# Patient Record
Sex: MALE | NOT HISPANIC OR LATINO
[De-identification: names, ages, dates, MRNs, and addresses within clinical notes are randomized per-mention and may not be internally consistent; named-entity substitution may affect disease eponyms.]

---

## 2022-01-01 ENCOUNTER — RESULT REVIEW (OUTPATIENT)
Age: 77
End: 2022-01-01

## 2022-01-01 ENCOUNTER — APPOINTMENT (OUTPATIENT)
Dept: BARIATRICS | Facility: CLINIC | Age: 77
End: 2022-01-01

## 2022-01-01 ENCOUNTER — TRANSCRIPTION ENCOUNTER (OUTPATIENT)
Age: 77
End: 2022-01-01

## 2022-01-01 ENCOUNTER — INPATIENT (INPATIENT)
Facility: HOSPITAL | Age: 77
LOS: 116 days | DRG: 270 | End: 2023-04-03
Attending: STUDENT IN AN ORGANIZED HEALTH CARE EDUCATION/TRAINING PROGRAM | Admitting: HOSPITALIST
Payer: MEDICARE

## 2022-01-01 VITALS
HEART RATE: 136 BPM | DIASTOLIC BLOOD PRESSURE: 90 MMHG | SYSTOLIC BLOOD PRESSURE: 122 MMHG | RESPIRATION RATE: 24 BRPM | WEIGHT: 141.98 LBS | HEIGHT: 78 IN

## 2022-01-01 DIAGNOSIS — D62 ACUTE POSTHEMORRHAGIC ANEMIA: ICD-10-CM

## 2022-01-01 DIAGNOSIS — I50.21 ACUTE SYSTOLIC (CONGESTIVE) HEART FAILURE: ICD-10-CM

## 2022-01-01 DIAGNOSIS — Z78.1 PHYSICAL RESTRAINT STATUS: ICD-10-CM

## 2022-01-01 DIAGNOSIS — D63.1 ANEMIA IN CHRONIC KIDNEY DISEASE: ICD-10-CM

## 2022-01-01 DIAGNOSIS — Z71.89 OTHER SPECIFIED COUNSELING: ICD-10-CM

## 2022-01-01 DIAGNOSIS — J98.11 ATELECTASIS: ICD-10-CM

## 2022-01-01 DIAGNOSIS — Z87.891 PERSONAL HISTORY OF NICOTINE DEPENDENCE: ICD-10-CM

## 2022-01-01 DIAGNOSIS — R00.1 BRADYCARDIA, UNSPECIFIED: ICD-10-CM

## 2022-01-01 DIAGNOSIS — K92.2 GASTROINTESTINAL HEMORRHAGE, UNSPECIFIED: ICD-10-CM

## 2022-01-01 DIAGNOSIS — G93.40 ENCEPHALOPATHY, UNSPECIFIED: ICD-10-CM

## 2022-01-01 DIAGNOSIS — N28.0 ISCHEMIA AND INFARCTION OF KIDNEY: ICD-10-CM

## 2022-01-01 DIAGNOSIS — A41.59 OTHER GRAM-NEGATIVE SEPSIS: ICD-10-CM

## 2022-01-01 DIAGNOSIS — I48.92 UNSPECIFIED ATRIAL FLUTTER: ICD-10-CM

## 2022-01-01 DIAGNOSIS — R57.8 OTHER SHOCK: ICD-10-CM

## 2022-01-01 DIAGNOSIS — R53.81 OTHER MALAISE: ICD-10-CM

## 2022-01-01 DIAGNOSIS — J02.9 ACUTE PHARYNGITIS, UNSPECIFIED: ICD-10-CM

## 2022-01-01 DIAGNOSIS — I82.412 ACUTE EMBOLISM AND THROMBOSIS OF LEFT FEMORAL VEIN: ICD-10-CM

## 2022-01-01 DIAGNOSIS — K59.03 DRUG INDUCED CONSTIPATION: ICD-10-CM

## 2022-01-01 DIAGNOSIS — Z66 DO NOT RESUSCITATE: ICD-10-CM

## 2022-01-01 DIAGNOSIS — R10.9 UNSPECIFIED ABDOMINAL PAIN: ICD-10-CM

## 2022-01-01 DIAGNOSIS — I44.1 ATRIOVENTRICULAR BLOCK, SECOND DEGREE: ICD-10-CM

## 2022-01-01 DIAGNOSIS — S80.822A BLISTER (NONTHERMAL), LEFT LOWER LEG, INITIAL ENCOUNTER: ICD-10-CM

## 2022-01-01 DIAGNOSIS — R57.0 CARDIOGENIC SHOCK: ICD-10-CM

## 2022-01-01 DIAGNOSIS — I50.9 HEART FAILURE, UNSPECIFIED: ICD-10-CM

## 2022-01-01 DIAGNOSIS — E87.6 HYPOKALEMIA: ICD-10-CM

## 2022-01-01 DIAGNOSIS — I26.93 SINGLE SUBSEGMENTAL PULMONARY EMBOLISM WITHOUT ACUTE COR PULMONALE: ICD-10-CM

## 2022-01-01 DIAGNOSIS — R53.2 FUNCTIONAL QUADRIPLEGIA: ICD-10-CM

## 2022-01-01 DIAGNOSIS — K92.1 MELENA: ICD-10-CM

## 2022-01-01 DIAGNOSIS — B18.2 CHRONIC VIRAL HEPATITIS C: ICD-10-CM

## 2022-01-01 DIAGNOSIS — F43.21 ADJUSTMENT DISORDER WITH DEPRESSED MOOD: ICD-10-CM

## 2022-01-01 DIAGNOSIS — F11.13 OPIOID ABUSE WITH WITHDRAWAL: ICD-10-CM

## 2022-01-01 DIAGNOSIS — I82.432 ACUTE EMBOLISM AND THROMBOSIS OF LEFT POPLITEAL VEIN: ICD-10-CM

## 2022-01-01 DIAGNOSIS — R45.851 SUICIDAL IDEATIONS: ICD-10-CM

## 2022-01-01 DIAGNOSIS — R65.21 SEVERE SEPSIS WITH SEPTIC SHOCK: ICD-10-CM

## 2022-01-01 DIAGNOSIS — I26.99 OTHER PULMONARY EMBOLISM WITHOUT ACUTE COR PULMONALE: ICD-10-CM

## 2022-01-01 DIAGNOSIS — E16.2 HYPOGLYCEMIA, UNSPECIFIED: ICD-10-CM

## 2022-01-01 DIAGNOSIS — R64 CACHEXIA: ICD-10-CM

## 2022-01-01 DIAGNOSIS — K55.059 ACUTE (REVERSIBLE) ISCHEMIA OF INTESTINE, PART AND EXTENT UNSPECIFIED: ICD-10-CM

## 2022-01-01 DIAGNOSIS — I99.8 OTHER DISORDER OF CIRCULATORY SYSTEM: ICD-10-CM

## 2022-01-01 DIAGNOSIS — Z51.5 ENCOUNTER FOR PALLIATIVE CARE: ICD-10-CM

## 2022-01-01 DIAGNOSIS — E43 UNSPECIFIED SEVERE PROTEIN-CALORIE MALNUTRITION: ICD-10-CM

## 2022-01-01 DIAGNOSIS — I70.262 ATHEROSCLEROSIS OF NATIVE ARTERIES OF EXTREMITIES WITH GANGRENE, LEFT LEG: ICD-10-CM

## 2022-01-01 DIAGNOSIS — E87.1 HYPO-OSMOLALITY AND HYPONATREMIA: ICD-10-CM

## 2022-01-01 DIAGNOSIS — E83.39 OTHER DISORDERS OF PHOSPHORUS METABOLISM: ICD-10-CM

## 2022-01-01 DIAGNOSIS — K55.9 VASCULAR DISORDER OF INTESTINE, UNSPECIFIED: ICD-10-CM

## 2022-01-01 DIAGNOSIS — Z16.12 EXTENDED SPECTRUM BETA LACTAMASE (ESBL) RESISTANCE: ICD-10-CM

## 2022-01-01 DIAGNOSIS — K66.1 HEMOPERITONEUM: ICD-10-CM

## 2022-01-01 DIAGNOSIS — I13.0 HYPERTENSIVE HEART AND CHRONIC KIDNEY DISEASE WITH HEART FAILURE AND STAGE 1 THROUGH STAGE 4 CHRONIC KIDNEY DISEASE, OR UNSPECIFIED CHRONIC KIDNEY DISEASE: ICD-10-CM

## 2022-01-01 DIAGNOSIS — B37.89 OTHER SITES OF CANDIDIASIS: ICD-10-CM

## 2022-01-01 DIAGNOSIS — N17.0 ACUTE KIDNEY FAILURE WITH TUBULAR NECROSIS: ICD-10-CM

## 2022-01-01 DIAGNOSIS — G47.00 INSOMNIA, UNSPECIFIED: ICD-10-CM

## 2022-01-01 DIAGNOSIS — K55.029 ACUTE INFARCTION OF SMALL INTESTINE, EXTENT UNSPECIFIED: ICD-10-CM

## 2022-01-01 DIAGNOSIS — I51.3 INTRACARDIAC THROMBOSIS, NOT ELSEWHERE CLASSIFIED: ICD-10-CM

## 2022-01-01 DIAGNOSIS — K65.1 PERITONEAL ABSCESS: ICD-10-CM

## 2022-01-01 DIAGNOSIS — F32.1 MAJOR DEPRESSIVE DISORDER, SINGLE EPISODE, MODERATE: ICD-10-CM

## 2022-01-01 DIAGNOSIS — Z43.2 ENCOUNTER FOR ATTENTION TO ILEOSTOMY: ICD-10-CM

## 2022-01-01 DIAGNOSIS — R18.8 OTHER ASCITES: ICD-10-CM

## 2022-01-01 DIAGNOSIS — E86.1 HYPOVOLEMIA: ICD-10-CM

## 2022-01-01 DIAGNOSIS — R45.89 OTHER SYMPTOMS AND SIGNS INVOLVING EMOTIONAL STATE: ICD-10-CM

## 2022-01-01 DIAGNOSIS — L89.150 PRESSURE ULCER OF SACRAL REGION, UNSTAGEABLE: ICD-10-CM

## 2022-01-01 DIAGNOSIS — E87.20 ACIDOSIS, UNSPECIFIED: ICD-10-CM

## 2022-01-01 DIAGNOSIS — A41.9 SEPSIS, UNSPECIFIED ORGANISM: ICD-10-CM

## 2022-01-01 DIAGNOSIS — I42.8 OTHER CARDIOMYOPATHIES: ICD-10-CM

## 2022-01-01 DIAGNOSIS — H15.89 OTHER DISORDERS OF SCLERA: ICD-10-CM

## 2022-01-01 DIAGNOSIS — N18.9 CHRONIC KIDNEY DISEASE, UNSPECIFIED: ICD-10-CM

## 2022-01-01 DIAGNOSIS — K56.7 ILEUS, UNSPECIFIED: ICD-10-CM

## 2022-01-01 DIAGNOSIS — R63.0 ANOREXIA: ICD-10-CM

## 2022-01-01 LAB
A1C WITH ESTIMATED AVERAGE GLUCOSE RESULT: 6 % — HIGH (ref 4–5.6)
ALBUMIN SERPL ELPH-MCNC: 2 G/DL — LOW (ref 3.3–5)
ALBUMIN SERPL ELPH-MCNC: 2.1 G/DL — LOW (ref 3.3–5)
ALBUMIN SERPL ELPH-MCNC: 2.2 G/DL — LOW (ref 3.3–5)
ALBUMIN SERPL ELPH-MCNC: 2.3 G/DL — LOW (ref 3.3–5)
ALBUMIN SERPL ELPH-MCNC: 2.3 G/DL — LOW (ref 3.3–5)
ALBUMIN SERPL ELPH-MCNC: 2.4 G/DL — LOW (ref 3.3–5)
ALBUMIN SERPL ELPH-MCNC: 2.5 G/DL — LOW (ref 3.3–5)
ALBUMIN SERPL ELPH-MCNC: 2.5 G/DL — LOW (ref 3.3–5)
ALBUMIN SERPL ELPH-MCNC: 2.6 G/DL — LOW (ref 3.3–5)
ALBUMIN SERPL ELPH-MCNC: 2.8 G/DL — LOW (ref 3.3–5)
ALBUMIN SERPL ELPH-MCNC: 3 G/DL — LOW (ref 3.3–5)
ALBUMIN SERPL ELPH-MCNC: 3 G/DL — LOW (ref 3.3–5)
ALBUMIN SERPL ELPH-MCNC: 3.1 G/DL — LOW (ref 3.3–5)
ALBUMIN SERPL ELPH-MCNC: 3.2 G/DL — LOW (ref 3.3–5)
ALBUMIN SERPL ELPH-MCNC: 3.2 G/DL — LOW (ref 3.3–5)
ALBUMIN SERPL ELPH-MCNC: 3.3 G/DL — SIGNIFICANT CHANGE UP (ref 3.3–5)
ALBUMIN SERPL ELPH-MCNC: 3.3 G/DL — SIGNIFICANT CHANGE UP (ref 3.3–5)
ALBUMIN SERPL ELPH-MCNC: 3.4 G/DL — SIGNIFICANT CHANGE UP (ref 3.3–5)
ALBUMIN SERPL ELPH-MCNC: 3.5 G/DL — SIGNIFICANT CHANGE UP (ref 3.3–5)
ALP SERPL-CCNC: 100 U/L — SIGNIFICANT CHANGE UP (ref 40–120)
ALP SERPL-CCNC: 104 U/L — SIGNIFICANT CHANGE UP (ref 40–120)
ALP SERPL-CCNC: 105 U/L — SIGNIFICANT CHANGE UP (ref 40–120)
ALP SERPL-CCNC: 105 U/L — SIGNIFICANT CHANGE UP (ref 40–120)
ALP SERPL-CCNC: 106 U/L — SIGNIFICANT CHANGE UP (ref 40–120)
ALP SERPL-CCNC: 118 U/L — SIGNIFICANT CHANGE UP (ref 40–120)
ALP SERPL-CCNC: 58 U/L — SIGNIFICANT CHANGE UP (ref 40–120)
ALP SERPL-CCNC: 58 U/L — SIGNIFICANT CHANGE UP (ref 40–120)
ALP SERPL-CCNC: 59 U/L — SIGNIFICANT CHANGE UP (ref 40–120)
ALP SERPL-CCNC: 62 U/L — SIGNIFICANT CHANGE UP (ref 40–120)
ALP SERPL-CCNC: 79 U/L — SIGNIFICANT CHANGE UP (ref 40–120)
ALP SERPL-CCNC: 80 U/L — SIGNIFICANT CHANGE UP (ref 40–120)
ALP SERPL-CCNC: 83 U/L — SIGNIFICANT CHANGE UP (ref 40–120)
ALP SERPL-CCNC: 84 U/L — SIGNIFICANT CHANGE UP (ref 40–120)
ALP SERPL-CCNC: 85 U/L — SIGNIFICANT CHANGE UP (ref 40–120)
ALP SERPL-CCNC: 86 U/L — SIGNIFICANT CHANGE UP (ref 40–120)
ALP SERPL-CCNC: 88 U/L — SIGNIFICANT CHANGE UP (ref 40–120)
ALP SERPL-CCNC: 91 U/L — SIGNIFICANT CHANGE UP (ref 40–120)
ALP SERPL-CCNC: 92 U/L — SIGNIFICANT CHANGE UP (ref 40–120)
ALP SERPL-CCNC: 93 U/L — SIGNIFICANT CHANGE UP (ref 40–120)
ALP SERPL-CCNC: 94 U/L — SIGNIFICANT CHANGE UP (ref 40–120)
ALP SERPL-CCNC: 96 U/L — SIGNIFICANT CHANGE UP (ref 40–120)
ALP SERPL-CCNC: 97 U/L — SIGNIFICANT CHANGE UP (ref 40–120)
ALP SERPL-CCNC: 97 U/L — SIGNIFICANT CHANGE UP (ref 40–120)
ALP SERPL-CCNC: 98 U/L — SIGNIFICANT CHANGE UP (ref 40–120)
ALT FLD-CCNC: 14 U/L — SIGNIFICANT CHANGE UP (ref 10–45)
ALT FLD-CCNC: 15 U/L — SIGNIFICANT CHANGE UP (ref 10–45)
ALT FLD-CCNC: 17 U/L — SIGNIFICANT CHANGE UP (ref 10–45)
ALT FLD-CCNC: 19 U/L — SIGNIFICANT CHANGE UP (ref 10–45)
ALT FLD-CCNC: 20 U/L — SIGNIFICANT CHANGE UP (ref 10–45)
ALT FLD-CCNC: 20 U/L — SIGNIFICANT CHANGE UP (ref 10–45)
ALT FLD-CCNC: 22 U/L — SIGNIFICANT CHANGE UP (ref 10–45)
ALT FLD-CCNC: 23 U/L — SIGNIFICANT CHANGE UP (ref 10–45)
ALT FLD-CCNC: 24 U/L — SIGNIFICANT CHANGE UP (ref 10–45)
ALT FLD-CCNC: 25 U/L — SIGNIFICANT CHANGE UP (ref 10–45)
ALT FLD-CCNC: 26 U/L — SIGNIFICANT CHANGE UP (ref 10–45)
ALT FLD-CCNC: 27 U/L — SIGNIFICANT CHANGE UP (ref 10–45)
ALT FLD-CCNC: 27 U/L — SIGNIFICANT CHANGE UP (ref 10–45)
ALT FLD-CCNC: 30 U/L — SIGNIFICANT CHANGE UP (ref 10–45)
ALT FLD-CCNC: 31 U/L — SIGNIFICANT CHANGE UP (ref 10–45)
AMPHET UR-MCNC: NEGATIVE — SIGNIFICANT CHANGE UP
ANA TITR SER: NEGATIVE — SIGNIFICANT CHANGE UP
ANION GAP SERPL CALC-SCNC: 10 MMOL/L — SIGNIFICANT CHANGE UP (ref 5–17)
ANION GAP SERPL CALC-SCNC: 11 MMOL/L — SIGNIFICANT CHANGE UP (ref 5–17)
ANION GAP SERPL CALC-SCNC: 12 MMOL/L — SIGNIFICANT CHANGE UP (ref 5–17)
ANION GAP SERPL CALC-SCNC: 5 MMOL/L — SIGNIFICANT CHANGE UP (ref 5–17)
ANION GAP SERPL CALC-SCNC: 5 MMOL/L — SIGNIFICANT CHANGE UP (ref 5–17)
ANION GAP SERPL CALC-SCNC: 6 MMOL/L — SIGNIFICANT CHANGE UP (ref 5–17)
ANION GAP SERPL CALC-SCNC: 7 MMOL/L — SIGNIFICANT CHANGE UP (ref 5–17)
ANION GAP SERPL CALC-SCNC: 8 MMOL/L — SIGNIFICANT CHANGE UP (ref 5–17)
ANION GAP SERPL CALC-SCNC: 9 MMOL/L — SIGNIFICANT CHANGE UP (ref 5–17)
ANISOCYTOSIS BLD QL: SLIGHT — SIGNIFICANT CHANGE UP
ANISOCYTOSIS BLD QL: SLIGHT — SIGNIFICANT CHANGE UP
APPEARANCE UR: ABNORMAL
APPEARANCE UR: CLEAR — SIGNIFICANT CHANGE UP
APPEARANCE UR: CLEAR — SIGNIFICANT CHANGE UP
APTT BLD: 100.9 SEC — HIGH (ref 27.5–35.5)
APTT BLD: 27.6 SEC — SIGNIFICANT CHANGE UP (ref 27.5–35.5)
APTT BLD: 28.1 SEC — SIGNIFICANT CHANGE UP (ref 27.5–35.5)
APTT BLD: 28.7 SEC — SIGNIFICANT CHANGE UP (ref 27.5–35.5)
APTT BLD: 29.1 SEC — SIGNIFICANT CHANGE UP (ref 27.5–35.5)
APTT BLD: 29.1 SEC — SIGNIFICANT CHANGE UP (ref 27.5–35.5)
APTT BLD: 29.9 SEC — SIGNIFICANT CHANGE UP (ref 27.5–35.5)
APTT BLD: 30.9 SEC — SIGNIFICANT CHANGE UP (ref 27.5–35.5)
APTT BLD: 36.9 SEC — HIGH (ref 27.5–35.5)
APTT BLD: 37.8 SEC — HIGH (ref 27.5–35.5)
APTT BLD: 38.5 SEC — HIGH (ref 27.5–35.5)
APTT BLD: 38.8 SEC — HIGH (ref 27.5–35.5)
APTT BLD: 43.9 SEC — HIGH (ref 27.5–35.5)
APTT BLD: 51.5 SEC — HIGH (ref 27.5–35.5)
APTT BLD: 52.1 SEC — HIGH (ref 27.5–35.5)
APTT BLD: 52.2 SEC — HIGH (ref 27.5–35.5)
APTT BLD: 52.5 SEC — HIGH (ref 27.5–35.5)
APTT BLD: 55.6 SEC — HIGH (ref 27.5–35.5)
APTT BLD: 55.7 SEC — HIGH (ref 27.5–35.5)
APTT BLD: 55.9 SEC — HIGH (ref 27.5–35.5)
APTT BLD: 59.3 SEC — HIGH (ref 27.5–35.5)
APTT BLD: 59.5 SEC — HIGH (ref 27.5–35.5)
APTT BLD: 59.8 SEC — HIGH (ref 27.5–35.5)
APTT BLD: 60.7 SEC — HIGH (ref 27.5–35.5)
APTT BLD: 61 SEC — HIGH (ref 27.5–35.5)
APTT BLD: 61.4 SEC — HIGH (ref 27.5–35.5)
APTT BLD: 61.5 SEC — HIGH (ref 27.5–35.5)
APTT BLD: 62.8 SEC — HIGH (ref 27.5–35.5)
APTT BLD: 63 SEC — HIGH (ref 27.5–35.5)
APTT BLD: 63.4 SEC — HIGH (ref 27.5–35.5)
APTT BLD: 63.6 SEC — HIGH (ref 27.5–35.5)
APTT BLD: 63.9 SEC — HIGH (ref 27.5–35.5)
APTT BLD: 64.9 SEC — HIGH (ref 27.5–35.5)
APTT BLD: 65.4 SEC — HIGH (ref 27.5–35.5)
APTT BLD: 66 SEC — HIGH (ref 27.5–35.5)
APTT BLD: 66.3 SEC — HIGH (ref 27.5–35.5)
APTT BLD: 66.6 SEC — HIGH (ref 27.5–35.5)
APTT BLD: 66.9 SEC — HIGH (ref 27.5–35.5)
APTT BLD: 68.6 SEC — HIGH (ref 27.5–35.5)
APTT BLD: 71.4 SEC — HIGH (ref 27.5–35.5)
APTT BLD: 72.3 SEC — HIGH (ref 27.5–35.5)
APTT BLD: 73.3 SEC — HIGH (ref 27.5–35.5)
APTT BLD: 76.4 SEC — HIGH (ref 27.5–35.5)
APTT BLD: 77.1 SEC — HIGH (ref 27.5–35.5)
APTT BLD: 78.1 SEC — HIGH (ref 27.5–35.5)
APTT BLD: 78.4 SEC — HIGH (ref 27.5–35.5)
APTT BLD: 78.8 SEC — HIGH (ref 27.5–35.5)
APTT BLD: 80 SEC — HIGH (ref 27.5–35.5)
APTT BLD: 80.5 SEC — HIGH (ref 27.5–35.5)
APTT BLD: 81.6 SEC — HIGH (ref 27.5–35.5)
APTT BLD: 81.7 SEC — HIGH (ref 27.5–35.5)
APTT BLD: 82.5 SEC — HIGH (ref 27.5–35.5)
APTT BLD: 83.1 SEC — HIGH (ref 27.5–35.5)
APTT BLD: 83.7 SEC — HIGH (ref 27.5–35.5)
APTT BLD: 84.6 SEC — HIGH (ref 27.5–35.5)
APTT BLD: 86.7 SEC — HIGH (ref 27.5–35.5)
APTT BLD: 87.7 SEC — HIGH (ref 27.5–35.5)
APTT BLD: 88.7 SEC — HIGH (ref 27.5–35.5)
APTT BLD: 90.1 SEC — HIGH (ref 27.5–35.5)
APTT BLD: >200 SEC — CRITICAL HIGH (ref 27.5–35.5)
AST SERPL-CCNC: 18 U/L — SIGNIFICANT CHANGE UP (ref 10–40)
AST SERPL-CCNC: 22 U/L — SIGNIFICANT CHANGE UP (ref 10–40)
AST SERPL-CCNC: 23 U/L — SIGNIFICANT CHANGE UP (ref 10–40)
AST SERPL-CCNC: 24 U/L — SIGNIFICANT CHANGE UP (ref 10–40)
AST SERPL-CCNC: 25 U/L — SIGNIFICANT CHANGE UP (ref 10–40)
AST SERPL-CCNC: 27 U/L — SIGNIFICANT CHANGE UP (ref 10–40)
AST SERPL-CCNC: 30 U/L — SIGNIFICANT CHANGE UP (ref 10–40)
AST SERPL-CCNC: 38 U/L — SIGNIFICANT CHANGE UP (ref 10–40)
AST SERPL-CCNC: 38 U/L — SIGNIFICANT CHANGE UP (ref 10–40)
AST SERPL-CCNC: 40 U/L — SIGNIFICANT CHANGE UP (ref 10–40)
AST SERPL-CCNC: 40 U/L — SIGNIFICANT CHANGE UP (ref 10–40)
AST SERPL-CCNC: 41 U/L — HIGH (ref 10–40)
AST SERPL-CCNC: 41 U/L — HIGH (ref 10–40)
AST SERPL-CCNC: 43 U/L — HIGH (ref 10–40)
AST SERPL-CCNC: 46 U/L — HIGH (ref 10–40)
AST SERPL-CCNC: 47 U/L — HIGH (ref 10–40)
AST SERPL-CCNC: 48 U/L — HIGH (ref 10–40)
AST SERPL-CCNC: 49 U/L — HIGH (ref 10–40)
AST SERPL-CCNC: 57 U/L — HIGH (ref 10–40)
AST SERPL-CCNC: 58 U/L — HIGH (ref 10–40)
AST SERPL-CCNC: 59 U/L — HIGH (ref 10–40)
AST SERPL-CCNC: 59 U/L — HIGH (ref 10–40)
AST SERPL-CCNC: 60 U/L — HIGH (ref 10–40)
AST SERPL-CCNC: 61 U/L — HIGH (ref 10–40)
AST SERPL-CCNC: 62 U/L — HIGH (ref 10–40)
B2 GLYCOPROT1 AB SER QL: NEGATIVE — SIGNIFICANT CHANGE UP
BACTERIA # UR AUTO: ABNORMAL /HPF
BACTERIA # UR AUTO: PRESENT /HPF
BACTERIA # UR AUTO: PRESENT /HPF
BARBITURATES UR SCN-MCNC: NEGATIVE — SIGNIFICANT CHANGE UP
BASE EXCESS BLDA CALC-SCNC: -0.9 MMOL/L — SIGNIFICANT CHANGE UP (ref -2–3)
BASE EXCESS BLDA CALC-SCNC: -1.3 MMOL/L — SIGNIFICANT CHANGE UP (ref -2–3)
BASE EXCESS BLDA CALC-SCNC: -1.4 MMOL/L — SIGNIFICANT CHANGE UP (ref -2–3)
BASE EXCESS BLDA CALC-SCNC: -1.4 MMOL/L — SIGNIFICANT CHANGE UP (ref -2–3)
BASE EXCESS BLDA CALC-SCNC: -1.5 MMOL/L — SIGNIFICANT CHANGE UP (ref -2–3)
BASE EXCESS BLDA CALC-SCNC: -2 MMOL/L — SIGNIFICANT CHANGE UP (ref -2–3)
BASE EXCESS BLDA CALC-SCNC: -2.2 MMOL/L — LOW (ref -2–3)
BASE EXCESS BLDA CALC-SCNC: -2.5 MMOL/L — LOW (ref -2–3)
BASE EXCESS BLDA CALC-SCNC: -3.3 MMOL/L — LOW (ref -2–3)
BASE EXCESS BLDA CALC-SCNC: -3.4 MMOL/L — LOW (ref -2–3)
BASE EXCESS BLDA CALC-SCNC: -3.6 MMOL/L — LOW (ref -2–3)
BASE EXCESS BLDA CALC-SCNC: -3.6 MMOL/L — LOW (ref -2–3)
BASE EXCESS BLDA CALC-SCNC: -4 MMOL/L — LOW (ref -2–3)
BASE EXCESS BLDA CALC-SCNC: -4.5 MMOL/L — LOW (ref -2–3)
BASE EXCESS BLDA CALC-SCNC: -4.8 MMOL/L — LOW (ref -2–3)
BASE EXCESS BLDA CALC-SCNC: 0.1 MMOL/L — SIGNIFICANT CHANGE UP (ref -2–3)
BASE EXCESS BLDA CALC-SCNC: 0.3 MMOL/L — SIGNIFICANT CHANGE UP (ref -2–3)
BASE EXCESS BLDA CALC-SCNC: 1.1 MMOL/L — SIGNIFICANT CHANGE UP (ref -2–3)
BASE EXCESS BLDA CALC-SCNC: 1.7 MMOL/L — SIGNIFICANT CHANGE UP (ref -2–3)
BASE EXCESS BLDV CALC-SCNC: -0.2 MMOL/L — SIGNIFICANT CHANGE UP (ref -2–3)
BASE EXCESS BLDV CALC-SCNC: -0.6 MMOL/L — SIGNIFICANT CHANGE UP (ref -2–3)
BASE EXCESS BLDV CALC-SCNC: -1.1 MMOL/L — SIGNIFICANT CHANGE UP (ref -2–3)
BASE EXCESS BLDV CALC-SCNC: -1.5 MMOL/L — SIGNIFICANT CHANGE UP (ref -2–3)
BASE EXCESS BLDV CALC-SCNC: -2.2 MMOL/L — LOW (ref -2–3)
BASE EXCESS BLDV CALC-SCNC: -4.1 MMOL/L — LOW (ref -2–3)
BASE EXCESS BLDV CALC-SCNC: -4.6 MMOL/L — LOW (ref -2–3)
BASE EXCESS BLDV CALC-SCNC: -4.6 MMOL/L — LOW (ref -2–3)
BASE EXCESS BLDV CALC-SCNC: -5.3 MMOL/L — LOW (ref -2–3)
BASE EXCESS BLDV CALC-SCNC: -7.4 MMOL/L — LOW (ref -2–3)
BASE EXCESS BLDV CALC-SCNC: 0 MMOL/L — SIGNIFICANT CHANGE UP (ref -2–3)
BASOPHILS # BLD AUTO: 0 K/UL — SIGNIFICANT CHANGE UP (ref 0–0.2)
BASOPHILS # BLD AUTO: 0 K/UL — SIGNIFICANT CHANGE UP (ref 0–0.2)
BASOPHILS # BLD AUTO: 0.01 K/UL — SIGNIFICANT CHANGE UP (ref 0–0.2)
BASOPHILS # BLD AUTO: 0.01 K/UL — SIGNIFICANT CHANGE UP (ref 0–0.2)
BASOPHILS # BLD AUTO: 0.02 K/UL — SIGNIFICANT CHANGE UP (ref 0–0.2)
BASOPHILS # BLD AUTO: 0.02 K/UL — SIGNIFICANT CHANGE UP (ref 0–0.2)
BASOPHILS # BLD AUTO: 0.03 K/UL — SIGNIFICANT CHANGE UP (ref 0–0.2)
BASOPHILS # BLD AUTO: 0.03 K/UL — SIGNIFICANT CHANGE UP (ref 0–0.2)
BASOPHILS # BLD AUTO: 0.04 K/UL — SIGNIFICANT CHANGE UP (ref 0–0.2)
BASOPHILS NFR BLD AUTO: 0 % — SIGNIFICANT CHANGE UP (ref 0–2)
BASOPHILS NFR BLD AUTO: 0 % — SIGNIFICANT CHANGE UP (ref 0–2)
BASOPHILS NFR BLD AUTO: 0.1 % — SIGNIFICANT CHANGE UP (ref 0–2)
BASOPHILS NFR BLD AUTO: 0.2 % — SIGNIFICANT CHANGE UP (ref 0–2)
BASOPHILS NFR BLD AUTO: 0.3 % — SIGNIFICANT CHANGE UP (ref 0–2)
BASOPHILS NFR BLD AUTO: 0.4 % — SIGNIFICANT CHANGE UP (ref 0–2)
BENZODIAZ UR-MCNC: NEGATIVE — SIGNIFICANT CHANGE UP
BILIRUB DIRECT SERPL-MCNC: 0.9 MG/DL — HIGH (ref 0–0.3)
BILIRUB DIRECT SERPL-MCNC: 0.9 MG/DL — HIGH (ref 0–0.3)
BILIRUB DIRECT SERPL-MCNC: 1.2 MG/DL — HIGH (ref 0–0.3)
BILIRUB DIRECT SERPL-MCNC: 1.4 MG/DL — HIGH (ref 0–0.3)
BILIRUB DIRECT SERPL-MCNC: 1.7 MG/DL — HIGH (ref 0–0.3)
BILIRUB DIRECT SERPL-MCNC: 2.1 MG/DL — HIGH (ref 0–0.3)
BILIRUB INDIRECT FLD-MCNC: 0.4 MG/DL — SIGNIFICANT CHANGE UP (ref 0.2–1)
BILIRUB INDIRECT FLD-MCNC: 0.4 MG/DL — SIGNIFICANT CHANGE UP (ref 0.2–1)
BILIRUB INDIRECT FLD-MCNC: 0.5 MG/DL — SIGNIFICANT CHANGE UP (ref 0.2–1)
BILIRUB INDIRECT FLD-MCNC: 0.6 MG/DL — SIGNIFICANT CHANGE UP (ref 0.2–1)
BILIRUB SERPL-MCNC: 0.8 MG/DL — SIGNIFICANT CHANGE UP (ref 0.2–1.2)
BILIRUB SERPL-MCNC: 0.9 MG/DL — SIGNIFICANT CHANGE UP (ref 0.2–1.2)
BILIRUB SERPL-MCNC: 1 MG/DL — SIGNIFICANT CHANGE UP (ref 0.2–1.2)
BILIRUB SERPL-MCNC: 1 MG/DL — SIGNIFICANT CHANGE UP (ref 0.2–1.2)
BILIRUB SERPL-MCNC: 1.1 MG/DL — SIGNIFICANT CHANGE UP (ref 0.2–1.2)
BILIRUB SERPL-MCNC: 1.1 MG/DL — SIGNIFICANT CHANGE UP (ref 0.2–1.2)
BILIRUB SERPL-MCNC: 1.2 MG/DL — SIGNIFICANT CHANGE UP (ref 0.2–1.2)
BILIRUB SERPL-MCNC: 1.3 MG/DL — HIGH (ref 0.2–1.2)
BILIRUB SERPL-MCNC: 1.4 MG/DL — HIGH (ref 0.2–1.2)
BILIRUB SERPL-MCNC: 1.5 MG/DL — HIGH (ref 0.2–1.2)
BILIRUB SERPL-MCNC: 1.6 MG/DL — HIGH (ref 0.2–1.2)
BILIRUB SERPL-MCNC: 1.6 MG/DL — HIGH (ref 0.2–1.2)
BILIRUB SERPL-MCNC: 1.8 MG/DL — HIGH (ref 0.2–1.2)
BILIRUB SERPL-MCNC: 1.9 MG/DL — HIGH (ref 0.2–1.2)
BILIRUB SERPL-MCNC: 2.3 MG/DL — HIGH (ref 0.2–1.2)
BILIRUB SERPL-MCNC: 2.6 MG/DL — HIGH (ref 0.2–1.2)
BILIRUB UR-MCNC: ABNORMAL
BILIRUB UR-MCNC: ABNORMAL
BILIRUB UR-MCNC: NEGATIVE — SIGNIFICANT CHANGE UP
BLD GP AB SCN SERPL QL: NEGATIVE — SIGNIFICANT CHANGE UP
BUN SERPL-MCNC: 10 MG/DL — SIGNIFICANT CHANGE UP (ref 7–23)
BUN SERPL-MCNC: 11 MG/DL — SIGNIFICANT CHANGE UP (ref 7–23)
BUN SERPL-MCNC: 12 MG/DL — SIGNIFICANT CHANGE UP (ref 7–23)
BUN SERPL-MCNC: 13 MG/DL — SIGNIFICANT CHANGE UP (ref 7–23)
BUN SERPL-MCNC: 14 MG/DL — SIGNIFICANT CHANGE UP (ref 7–23)
BUN SERPL-MCNC: 17 MG/DL — SIGNIFICANT CHANGE UP (ref 7–23)
BUN SERPL-MCNC: 19 MG/DL — SIGNIFICANT CHANGE UP (ref 7–23)
BUN SERPL-MCNC: 19 MG/DL — SIGNIFICANT CHANGE UP (ref 7–23)
BUN SERPL-MCNC: 20 MG/DL — SIGNIFICANT CHANGE UP (ref 7–23)
BUN SERPL-MCNC: 20 MG/DL — SIGNIFICANT CHANGE UP (ref 7–23)
BUN SERPL-MCNC: 24 MG/DL — HIGH (ref 7–23)
BUN SERPL-MCNC: 25 MG/DL — HIGH (ref 7–23)
BUN SERPL-MCNC: 28 MG/DL — HIGH (ref 7–23)
BUN SERPL-MCNC: 35 MG/DL — HIGH (ref 7–23)
BUN SERPL-MCNC: 38 MG/DL — HIGH (ref 7–23)
BUN SERPL-MCNC: 39 MG/DL — HIGH (ref 7–23)
BUN SERPL-MCNC: 6 MG/DL — LOW (ref 7–23)
BUN SERPL-MCNC: 7 MG/DL — SIGNIFICANT CHANGE UP (ref 7–23)
BUN SERPL-MCNC: 8 MG/DL — SIGNIFICANT CHANGE UP (ref 7–23)
BUN SERPL-MCNC: 9 MG/DL — SIGNIFICANT CHANGE UP (ref 7–23)
BUN SERPL-MCNC: 9 MG/DL — SIGNIFICANT CHANGE UP (ref 7–23)
BURR CELLS BLD QL SMEAR: PRESENT — SIGNIFICANT CHANGE UP
BURR CELLS BLD QL SMEAR: PRESENT — SIGNIFICANT CHANGE UP
CA-I BLDA-SCNC: 1.05 MMOL/L — LOW (ref 1.15–1.33)
CA-I BLDA-SCNC: 1.13 MMOL/L — LOW (ref 1.15–1.33)
CA-I SERPL-SCNC: 0.82 MMOL/L — LOW (ref 1.15–1.33)
CA-I SERPL-SCNC: 0.99 MMOL/L — LOW (ref 1.15–1.33)
CA-I SERPL-SCNC: 0.99 MMOL/L — LOW (ref 1.15–1.33)
CA-I SERPL-SCNC: 1.01 MMOL/L — LOW (ref 1.15–1.33)
CA-I SERPL-SCNC: 1.02 MMOL/L — LOW (ref 1.15–1.33)
CA-I SERPL-SCNC: 1.03 MMOL/L — LOW (ref 1.15–1.33)
CA-I SERPL-SCNC: 1.06 MMOL/L — LOW (ref 1.15–1.33)
CA-I SERPL-SCNC: 1.07 MMOL/L — LOW (ref 1.15–1.33)
CA-I SERPL-SCNC: 1.12 MMOL/L — LOW (ref 1.15–1.33)
CA-I SERPL-SCNC: 1.16 MMOL/L — SIGNIFICANT CHANGE UP (ref 1.15–1.33)
CALCIUM SERPL-MCNC: 7.3 MG/DL — LOW (ref 8.4–10.5)
CALCIUM SERPL-MCNC: 7.5 MG/DL — LOW (ref 8.4–10.5)
CALCIUM SERPL-MCNC: 7.5 MG/DL — LOW (ref 8.4–10.5)
CALCIUM SERPL-MCNC: 7.6 MG/DL — LOW (ref 8.4–10.5)
CALCIUM SERPL-MCNC: 7.7 MG/DL — LOW (ref 8.4–10.5)
CALCIUM SERPL-MCNC: 7.8 MG/DL — LOW (ref 8.4–10.5)
CALCIUM SERPL-MCNC: 8 MG/DL — LOW (ref 8.4–10.5)
CALCIUM SERPL-MCNC: 8.1 MG/DL — LOW (ref 8.4–10.5)
CALCIUM SERPL-MCNC: 8.2 MG/DL — LOW (ref 8.4–10.5)
CALCIUM SERPL-MCNC: 8.3 MG/DL — LOW (ref 8.4–10.5)
CALCIUM SERPL-MCNC: 8.4 MG/DL — SIGNIFICANT CHANGE UP (ref 8.4–10.5)
CALCIUM SERPL-MCNC: 8.4 MG/DL — SIGNIFICANT CHANGE UP (ref 8.4–10.5)
CALCIUM SERPL-MCNC: 8.5 MG/DL — SIGNIFICANT CHANGE UP (ref 8.4–10.5)
CALCIUM SERPL-MCNC: 8.5 MG/DL — SIGNIFICANT CHANGE UP (ref 8.4–10.5)
CALCIUM SERPL-MCNC: 8.7 MG/DL — SIGNIFICANT CHANGE UP (ref 8.4–10.5)
CALCIUM SERPL-MCNC: 8.8 MG/DL — SIGNIFICANT CHANGE UP (ref 8.4–10.5)
CALCIUM SERPL-MCNC: 8.8 MG/DL — SIGNIFICANT CHANGE UP (ref 8.4–10.5)
CALCIUM SERPL-MCNC: 8.9 MG/DL — SIGNIFICANT CHANGE UP (ref 8.4–10.5)
CALCIUM SERPL-MCNC: 9.2 MG/DL — SIGNIFICANT CHANGE UP (ref 8.4–10.5)
CARDIOLIPIN AB SER-ACNC: NEGATIVE — SIGNIFICANT CHANGE UP
CHLORIDE SERPL-SCNC: 100 MMOL/L — SIGNIFICANT CHANGE UP (ref 96–108)
CHLORIDE SERPL-SCNC: 101 MMOL/L — SIGNIFICANT CHANGE UP (ref 96–108)
CHLORIDE SERPL-SCNC: 102 MMOL/L — SIGNIFICANT CHANGE UP (ref 96–108)
CHLORIDE SERPL-SCNC: 103 MMOL/L — SIGNIFICANT CHANGE UP (ref 96–108)
CHLORIDE SERPL-SCNC: 104 MMOL/L — SIGNIFICANT CHANGE UP (ref 96–108)
CHLORIDE SERPL-SCNC: 105 MMOL/L — SIGNIFICANT CHANGE UP (ref 96–108)
CHLORIDE SERPL-SCNC: 106 MMOL/L — SIGNIFICANT CHANGE UP (ref 96–108)
CHLORIDE SERPL-SCNC: 106 MMOL/L — SIGNIFICANT CHANGE UP (ref 96–108)
CHLORIDE SERPL-SCNC: 108 MMOL/L — SIGNIFICANT CHANGE UP (ref 96–108)
CHLORIDE SERPL-SCNC: 109 MMOL/L — HIGH (ref 96–108)
CHLORIDE SERPL-SCNC: 94 MMOL/L — LOW (ref 96–108)
CHLORIDE SERPL-SCNC: 97 MMOL/L — SIGNIFICANT CHANGE UP (ref 96–108)
CHLORIDE SERPL-SCNC: 97 MMOL/L — SIGNIFICANT CHANGE UP (ref 96–108)
CHLORIDE SERPL-SCNC: 99 MMOL/L — SIGNIFICANT CHANGE UP (ref 96–108)
CHLORIDE SERPL-SCNC: 99 MMOL/L — SIGNIFICANT CHANGE UP (ref 96–108)
CK MB CFR SERPL CALC: 1.8 NG/ML — SIGNIFICANT CHANGE UP (ref 0–6.7)
CK MB CFR SERPL CALC: 10.7 NG/ML — HIGH (ref 0–6.7)
CK MB CFR SERPL CALC: 11.7 NG/ML — HIGH (ref 0–6.7)
CK MB CFR SERPL CALC: 12.2 NG/ML — HIGH (ref 0–6.7)
CK MB CFR SERPL CALC: 13.4 NG/ML — HIGH (ref 0–6.7)
CK MB CFR SERPL CALC: 2.7 NG/ML — SIGNIFICANT CHANGE UP (ref 0–6.7)
CK MB CFR SERPL CALC: 21.7 NG/ML — HIGH (ref 0–6.7)
CK MB CFR SERPL CALC: 22.5 NG/ML — HIGH (ref 0–6.7)
CK MB CFR SERPL CALC: 5.3 NG/ML — SIGNIFICANT CHANGE UP (ref 0–6.7)
CK MB CFR SERPL CALC: 9.1 NG/ML — HIGH (ref 0–6.7)
CK SERPL-CCNC: 1143 U/L — HIGH (ref 30–200)
CK SERPL-CCNC: 1409 U/L — HIGH (ref 30–200)
CK SERPL-CCNC: 1412 U/L — HIGH (ref 30–200)
CK SERPL-CCNC: 1416 U/L — HIGH (ref 30–200)
CK SERPL-CCNC: 1501 U/L — HIGH (ref 30–200)
CK SERPL-CCNC: 1525 U/L — HIGH (ref 30–200)
CK SERPL-CCNC: 1642 U/L — HIGH (ref 30–200)
CK SERPL-CCNC: 1653 U/L — HIGH (ref 30–200)
CK SERPL-CCNC: 1773 U/L — HIGH (ref 30–200)
CK SERPL-CCNC: 1787 U/L — HIGH (ref 30–200)
CK SERPL-CCNC: 1809 U/L — HIGH (ref 30–200)
CK SERPL-CCNC: 1834 U/L — HIGH (ref 30–200)
CK SERPL-CCNC: 1859 U/L — HIGH (ref 30–200)
CK SERPL-CCNC: 1866 U/L — HIGH (ref 30–200)
CK SERPL-CCNC: 273 U/L — HIGH (ref 30–200)
CK SERPL-CCNC: 511 U/L — HIGH (ref 30–200)
CK SERPL-CCNC: 563 U/L — HIGH (ref 30–200)
CK SERPL-CCNC: 571 U/L — HIGH (ref 30–200)
CK SERPL-CCNC: 666 U/L — HIGH (ref 30–200)
CK SERPL-CCNC: 744 U/L — HIGH (ref 30–200)
CK SERPL-CCNC: 948 U/L — HIGH (ref 30–200)
CO2 BLDA-SCNC: 21 MMOL/L — SIGNIFICANT CHANGE UP (ref 19–24)
CO2 BLDA-SCNC: 22 MMOL/L — SIGNIFICANT CHANGE UP (ref 19–24)
CO2 BLDA-SCNC: 23 MMOL/L — SIGNIFICANT CHANGE UP (ref 19–24)
CO2 BLDA-SCNC: 24 MMOL/L — SIGNIFICANT CHANGE UP (ref 19–24)
CO2 BLDA-SCNC: 25 MMOL/L — HIGH (ref 19–24)
CO2 BLDA-SCNC: 26 MMOL/L — HIGH (ref 19–24)
CO2 BLDV-SCNC: 19.2 MMOL/L — LOW (ref 22–26)
CO2 BLDV-SCNC: 22.6 MMOL/L — SIGNIFICANT CHANGE UP (ref 22–26)
CO2 BLDV-SCNC: 23.5 MMOL/L — SIGNIFICANT CHANGE UP (ref 22–26)
CO2 BLDV-SCNC: 23.6 MMOL/L — SIGNIFICANT CHANGE UP (ref 22–26)
CO2 BLDV-SCNC: 24.8 MMOL/L — SIGNIFICANT CHANGE UP (ref 22–26)
CO2 BLDV-SCNC: 25 MMOL/L — SIGNIFICANT CHANGE UP (ref 22–26)
CO2 BLDV-SCNC: 25.6 MMOL/L — SIGNIFICANT CHANGE UP (ref 22–26)
CO2 BLDV-SCNC: 26 MMOL/L — SIGNIFICANT CHANGE UP (ref 22–26)
CO2 BLDV-SCNC: 26.2 MMOL/L — HIGH (ref 22–26)
CO2 BLDV-SCNC: 26.8 MMOL/L — HIGH (ref 22–26)
CO2 BLDV-SCNC: 27.3 MMOL/L — HIGH (ref 22–26)
CO2 SERPL-SCNC: 20 MMOL/L — LOW (ref 22–31)
CO2 SERPL-SCNC: 20 MMOL/L — LOW (ref 22–31)
CO2 SERPL-SCNC: 21 MMOL/L — LOW (ref 22–31)
CO2 SERPL-SCNC: 22 MMOL/L — SIGNIFICANT CHANGE UP (ref 22–31)
CO2 SERPL-SCNC: 23 MMOL/L — SIGNIFICANT CHANGE UP (ref 22–31)
CO2 SERPL-SCNC: 24 MMOL/L — SIGNIFICANT CHANGE UP (ref 22–31)
CO2 SERPL-SCNC: 25 MMOL/L — SIGNIFICANT CHANGE UP (ref 22–31)
CO2 SERPL-SCNC: 26 MMOL/L — SIGNIFICANT CHANGE UP (ref 22–31)
COCAINE METAB.OTHER UR-MCNC: NEGATIVE — SIGNIFICANT CHANGE UP
COHGB MFR BLDA: 1.1 % — SIGNIFICANT CHANGE UP
COHGB MFR BLDA: 1.4 % — SIGNIFICANT CHANGE UP
COLOR SPEC: ABNORMAL
COLOR SPEC: YELLOW — SIGNIFICANT CHANGE UP
COLOR SPEC: YELLOW — SIGNIFICANT CHANGE UP
COMMENT - URINE: SIGNIFICANT CHANGE UP
CONFIRM APTT STACLOT: POSITIVE
CREAT ?TM UR-MCNC: 120 MG/DL — SIGNIFICANT CHANGE UP
CREAT ?TM UR-MCNC: 120 MG/DL — SIGNIFICANT CHANGE UP
CREAT ?TM UR-MCNC: 140 MG/DL — SIGNIFICANT CHANGE UP
CREAT ?TM UR-MCNC: 152 MG/DL — SIGNIFICANT CHANGE UP
CREAT ?TM UR-MCNC: 156 MG/DL — SIGNIFICANT CHANGE UP
CREAT ?TM UR-MCNC: 169 MG/DL — SIGNIFICANT CHANGE UP
CREAT SERPL-MCNC: 0.62 MG/DL — SIGNIFICANT CHANGE UP (ref 0.5–1.3)
CREAT SERPL-MCNC: 0.64 MG/DL — SIGNIFICANT CHANGE UP (ref 0.5–1.3)
CREAT SERPL-MCNC: 0.66 MG/DL — SIGNIFICANT CHANGE UP (ref 0.5–1.3)
CREAT SERPL-MCNC: 0.67 MG/DL — SIGNIFICANT CHANGE UP (ref 0.5–1.3)
CREAT SERPL-MCNC: 0.67 MG/DL — SIGNIFICANT CHANGE UP (ref 0.5–1.3)
CREAT SERPL-MCNC: 0.68 MG/DL — SIGNIFICANT CHANGE UP (ref 0.5–1.3)
CREAT SERPL-MCNC: 0.68 MG/DL — SIGNIFICANT CHANGE UP (ref 0.5–1.3)
CREAT SERPL-MCNC: 0.7 MG/DL — SIGNIFICANT CHANGE UP (ref 0.5–1.3)
CREAT SERPL-MCNC: 0.7 MG/DL — SIGNIFICANT CHANGE UP (ref 0.5–1.3)
CREAT SERPL-MCNC: 0.71 MG/DL — SIGNIFICANT CHANGE UP (ref 0.5–1.3)
CREAT SERPL-MCNC: 0.72 MG/DL — SIGNIFICANT CHANGE UP (ref 0.5–1.3)
CREAT SERPL-MCNC: 0.74 MG/DL — SIGNIFICANT CHANGE UP (ref 0.5–1.3)
CREAT SERPL-MCNC: 0.74 MG/DL — SIGNIFICANT CHANGE UP (ref 0.5–1.3)
CREAT SERPL-MCNC: 0.75 MG/DL — SIGNIFICANT CHANGE UP (ref 0.5–1.3)
CREAT SERPL-MCNC: 0.77 MG/DL — SIGNIFICANT CHANGE UP (ref 0.5–1.3)
CREAT SERPL-MCNC: 0.78 MG/DL — SIGNIFICANT CHANGE UP (ref 0.5–1.3)
CREAT SERPL-MCNC: 0.8 MG/DL — SIGNIFICANT CHANGE UP (ref 0.5–1.3)
CREAT SERPL-MCNC: 0.81 MG/DL — SIGNIFICANT CHANGE UP (ref 0.5–1.3)
CREAT SERPL-MCNC: 0.82 MG/DL — SIGNIFICANT CHANGE UP (ref 0.5–1.3)
CREAT SERPL-MCNC: 0.85 MG/DL — SIGNIFICANT CHANGE UP (ref 0.5–1.3)
CREAT SERPL-MCNC: 0.85 MG/DL — SIGNIFICANT CHANGE UP (ref 0.5–1.3)
CREAT SERPL-MCNC: 0.88 MG/DL — SIGNIFICANT CHANGE UP (ref 0.5–1.3)
CREAT SERPL-MCNC: 0.9 MG/DL — SIGNIFICANT CHANGE UP (ref 0.5–1.3)
CREAT SERPL-MCNC: 0.94 MG/DL — SIGNIFICANT CHANGE UP (ref 0.5–1.3)
CREAT SERPL-MCNC: 0.96 MG/DL — SIGNIFICANT CHANGE UP (ref 0.5–1.3)
CREAT SERPL-MCNC: 0.98 MG/DL — SIGNIFICANT CHANGE UP (ref 0.5–1.3)
CREAT SERPL-MCNC: 1.02 MG/DL — SIGNIFICANT CHANGE UP (ref 0.5–1.3)
CREAT SERPL-MCNC: 1.11 MG/DL — SIGNIFICANT CHANGE UP (ref 0.5–1.3)
CREAT SERPL-MCNC: 1.18 MG/DL — SIGNIFICANT CHANGE UP (ref 0.5–1.3)
CREAT SERPL-MCNC: 1.25 MG/DL — SIGNIFICANT CHANGE UP (ref 0.5–1.3)
CREAT SERPL-MCNC: 1.26 MG/DL — SIGNIFICANT CHANGE UP (ref 0.5–1.3)
CREAT SERPL-MCNC: 1.27 MG/DL — SIGNIFICANT CHANGE UP (ref 0.5–1.3)
CREATININE, RNDM UR: 12.93 MMOL/L — SIGNIFICANT CHANGE UP (ref 1.77–23.31)
CULTURE RESULTS: SIGNIFICANT CHANGE UP
CULTURE RESULTS: SIGNIFICANT CHANGE UP
CYSTINE UR-MCNC: 35.3 — HIGH (ref 3.4–16.4)
DIFF PNL FLD: ABNORMAL
DIGOXIN SERPL-MCNC: 1.1 NG/ML — SIGNIFICANT CHANGE UP (ref 0.8–2)
DIGOXIN SERPL-MCNC: 1.1 NG/ML — SIGNIFICANT CHANGE UP (ref 0.8–2)
DIGOXIN SERPL-MCNC: 1.3 NG/ML — SIGNIFICANT CHANGE UP (ref 0.8–2)
DRVVT SCREEN TO CONFIRM RATIO: SIGNIFICANT CHANGE UP
EGFR: 100 ML/MIN/1.73M2 — SIGNIFICANT CHANGE UP
EGFR: 59 ML/MIN/1.73M2 — LOW
EGFR: 59 ML/MIN/1.73M2 — LOW
EGFR: 60 ML/MIN/1.73M2 — SIGNIFICANT CHANGE UP
EGFR: 64 ML/MIN/1.73M2 — SIGNIFICANT CHANGE UP
EGFR: 69 ML/MIN/1.73M2 — SIGNIFICANT CHANGE UP
EGFR: 77 ML/MIN/1.73M2 — SIGNIFICANT CHANGE UP
EGFR: 80 ML/MIN/1.73M2 — SIGNIFICANT CHANGE UP
EGFR: 82 ML/MIN/1.73M2 — SIGNIFICANT CHANGE UP
EGFR: 85 ML/MIN/1.73M2 — SIGNIFICANT CHANGE UP
EGFR: 89 ML/MIN/1.73M2 — SIGNIFICANT CHANGE UP
EGFR: 90 ML/MIN/1.73M2 — SIGNIFICANT CHANGE UP
EGFR: 91 ML/MIN/1.73M2 — SIGNIFICANT CHANGE UP
EGFR: 91 ML/MIN/1.73M2 — SIGNIFICANT CHANGE UP
EGFR: 92 ML/MIN/1.73M2 — SIGNIFICANT CHANGE UP
EGFR: 93 ML/MIN/1.73M2 — SIGNIFICANT CHANGE UP
EGFR: 93 ML/MIN/1.73M2 — SIGNIFICANT CHANGE UP
EGFR: 94 ML/MIN/1.73M2 — SIGNIFICANT CHANGE UP
EGFR: 95 ML/MIN/1.73M2 — SIGNIFICANT CHANGE UP
EGFR: 96 ML/MIN/1.73M2 — SIGNIFICANT CHANGE UP
EGFR: 97 ML/MIN/1.73M2 — SIGNIFICANT CHANGE UP
EGFR: 98 ML/MIN/1.73M2 — SIGNIFICANT CHANGE UP
EGFR: 99 ML/MIN/1.73M2 — SIGNIFICANT CHANGE UP
EOSINOPHIL # BLD AUTO: 0 K/UL — SIGNIFICANT CHANGE UP (ref 0–0.5)
EOSINOPHIL # BLD AUTO: 0.01 K/UL — SIGNIFICANT CHANGE UP (ref 0–0.5)
EOSINOPHIL # BLD AUTO: 0.03 K/UL — SIGNIFICANT CHANGE UP (ref 0–0.5)
EOSINOPHIL # BLD AUTO: 0.13 K/UL — SIGNIFICANT CHANGE UP (ref 0–0.5)
EOSINOPHIL NFR BLD AUTO: 0 % — SIGNIFICANT CHANGE UP (ref 0–6)
EOSINOPHIL NFR BLD AUTO: 0.1 % — SIGNIFICANT CHANGE UP (ref 0–6)
EOSINOPHIL NFR BLD AUTO: 0.2 % — SIGNIFICANT CHANGE UP (ref 0–6)
EOSINOPHIL NFR BLD AUTO: 1.8 % — SIGNIFICANT CHANGE UP (ref 0–6)
EPI CELLS # UR: SIGNIFICANT CHANGE UP /HPF (ref 0–5)
ESTIMATED AVERAGE GLUCOSE: 126 MG/DL — HIGH (ref 68–114)
FIBRINOGEN PPP-MCNC: 425 MG/DL — SIGNIFICANT CHANGE UP (ref 258–438)
FIBRINOGEN PPP-MCNC: 469 MG/DL — HIGH (ref 258–438)
FIBRINOGEN PPP-MCNC: 619 MG/DL — HIGH (ref 258–438)
FIBRINOGEN PPP-MCNC: 718 MG/DL — HIGH (ref 258–438)
GAS PNL BLDA: SIGNIFICANT CHANGE UP
GAS PNL BLDV: 113 MMOL/L — CRITICAL LOW (ref 136–145)
GAS PNL BLDV: 125 MMOL/L — LOW (ref 136–145)
GAS PNL BLDV: 126 MMOL/L — LOW (ref 136–145)
GAS PNL BLDV: 127 MMOL/L — LOW (ref 136–145)
GAS PNL BLDV: 130 MMOL/L — LOW (ref 136–145)
GAS PNL BLDV: 130 MMOL/L — LOW (ref 136–145)
GAS PNL BLDV: 131 MMOL/L — LOW (ref 136–145)
GAS PNL BLDV: 135 MMOL/L — LOW (ref 136–145)
GAS PNL BLDV: SIGNIFICANT CHANGE UP
GGT SERPL-CCNC: 108 U/L — HIGH (ref 9–50)
GI PCR PANEL: SIGNIFICANT CHANGE UP
GIANT PLATELETS BLD QL SMEAR: PRESENT — SIGNIFICANT CHANGE UP
GLUCOSE BLDA-MCNC: 108 MG/DL — HIGH (ref 70–99)
GLUCOSE BLDA-MCNC: 120 MG/DL — HIGH (ref 70–99)
GLUCOSE BLDC GLUCOMTR-MCNC: 100 MG/DL — HIGH (ref 70–99)
GLUCOSE BLDC GLUCOMTR-MCNC: 101 MG/DL — HIGH (ref 70–99)
GLUCOSE BLDC GLUCOMTR-MCNC: 101 MG/DL — HIGH (ref 70–99)
GLUCOSE BLDC GLUCOMTR-MCNC: 102 MG/DL — HIGH (ref 70–99)
GLUCOSE BLDC GLUCOMTR-MCNC: 102 MG/DL — HIGH (ref 70–99)
GLUCOSE BLDC GLUCOMTR-MCNC: 103 MG/DL — HIGH (ref 70–99)
GLUCOSE BLDC GLUCOMTR-MCNC: 103 MG/DL — HIGH (ref 70–99)
GLUCOSE BLDC GLUCOMTR-MCNC: 105 MG/DL — HIGH (ref 70–99)
GLUCOSE BLDC GLUCOMTR-MCNC: 105 MG/DL — HIGH (ref 70–99)
GLUCOSE BLDC GLUCOMTR-MCNC: 106 MG/DL — HIGH (ref 70–99)
GLUCOSE BLDC GLUCOMTR-MCNC: 107 MG/DL — HIGH (ref 70–99)
GLUCOSE BLDC GLUCOMTR-MCNC: 108 MG/DL — HIGH (ref 70–99)
GLUCOSE BLDC GLUCOMTR-MCNC: 108 MG/DL — HIGH (ref 70–99)
GLUCOSE BLDC GLUCOMTR-MCNC: 109 MG/DL — HIGH (ref 70–99)
GLUCOSE BLDC GLUCOMTR-MCNC: 109 MG/DL — HIGH (ref 70–99)
GLUCOSE BLDC GLUCOMTR-MCNC: 110 MG/DL — HIGH (ref 70–99)
GLUCOSE BLDC GLUCOMTR-MCNC: 111 MG/DL — HIGH (ref 70–99)
GLUCOSE BLDC GLUCOMTR-MCNC: 114 MG/DL — HIGH (ref 70–99)
GLUCOSE BLDC GLUCOMTR-MCNC: 115 MG/DL — HIGH (ref 70–99)
GLUCOSE BLDC GLUCOMTR-MCNC: 116 MG/DL — HIGH (ref 70–99)
GLUCOSE BLDC GLUCOMTR-MCNC: 118 MG/DL — HIGH (ref 70–99)
GLUCOSE BLDC GLUCOMTR-MCNC: 119 MG/DL — HIGH (ref 70–99)
GLUCOSE BLDC GLUCOMTR-MCNC: 121 MG/DL — HIGH (ref 70–99)
GLUCOSE BLDC GLUCOMTR-MCNC: 126 MG/DL — HIGH (ref 70–99)
GLUCOSE BLDC GLUCOMTR-MCNC: 129 MG/DL — HIGH (ref 70–99)
GLUCOSE BLDC GLUCOMTR-MCNC: 130 MG/DL — HIGH (ref 70–99)
GLUCOSE BLDC GLUCOMTR-MCNC: 130 MG/DL — HIGH (ref 70–99)
GLUCOSE BLDC GLUCOMTR-MCNC: 132 MG/DL — HIGH (ref 70–99)
GLUCOSE BLDC GLUCOMTR-MCNC: 132 MG/DL — HIGH (ref 70–99)
GLUCOSE BLDC GLUCOMTR-MCNC: 136 MG/DL — HIGH (ref 70–99)
GLUCOSE BLDC GLUCOMTR-MCNC: 138 MG/DL — HIGH (ref 70–99)
GLUCOSE BLDC GLUCOMTR-MCNC: 141 MG/DL — HIGH (ref 70–99)
GLUCOSE BLDC GLUCOMTR-MCNC: 144 MG/DL — HIGH (ref 70–99)
GLUCOSE BLDC GLUCOMTR-MCNC: 145 MG/DL — HIGH (ref 70–99)
GLUCOSE BLDC GLUCOMTR-MCNC: 152 MG/DL — HIGH (ref 70–99)
GLUCOSE BLDC GLUCOMTR-MCNC: 154 MG/DL — HIGH (ref 70–99)
GLUCOSE BLDC GLUCOMTR-MCNC: 61 MG/DL — LOW (ref 70–99)
GLUCOSE BLDC GLUCOMTR-MCNC: 77 MG/DL — SIGNIFICANT CHANGE UP (ref 70–99)
GLUCOSE BLDC GLUCOMTR-MCNC: 78 MG/DL — SIGNIFICANT CHANGE UP (ref 70–99)
GLUCOSE BLDC GLUCOMTR-MCNC: 79 MG/DL — SIGNIFICANT CHANGE UP (ref 70–99)
GLUCOSE BLDC GLUCOMTR-MCNC: 80 MG/DL — SIGNIFICANT CHANGE UP (ref 70–99)
GLUCOSE BLDC GLUCOMTR-MCNC: 80 MG/DL — SIGNIFICANT CHANGE UP (ref 70–99)
GLUCOSE BLDC GLUCOMTR-MCNC: 83 MG/DL — SIGNIFICANT CHANGE UP (ref 70–99)
GLUCOSE BLDC GLUCOMTR-MCNC: 83 MG/DL — SIGNIFICANT CHANGE UP (ref 70–99)
GLUCOSE BLDC GLUCOMTR-MCNC: 84 MG/DL — SIGNIFICANT CHANGE UP (ref 70–99)
GLUCOSE BLDC GLUCOMTR-MCNC: 84 MG/DL — SIGNIFICANT CHANGE UP (ref 70–99)
GLUCOSE BLDC GLUCOMTR-MCNC: 86 MG/DL — SIGNIFICANT CHANGE UP (ref 70–99)
GLUCOSE BLDC GLUCOMTR-MCNC: 87 MG/DL — SIGNIFICANT CHANGE UP (ref 70–99)
GLUCOSE BLDC GLUCOMTR-MCNC: 87 MG/DL — SIGNIFICANT CHANGE UP (ref 70–99)
GLUCOSE BLDC GLUCOMTR-MCNC: 88 MG/DL — SIGNIFICANT CHANGE UP (ref 70–99)
GLUCOSE BLDC GLUCOMTR-MCNC: 89 MG/DL — SIGNIFICANT CHANGE UP (ref 70–99)
GLUCOSE BLDC GLUCOMTR-MCNC: 89 MG/DL — SIGNIFICANT CHANGE UP (ref 70–99)
GLUCOSE BLDC GLUCOMTR-MCNC: 90 MG/DL — SIGNIFICANT CHANGE UP (ref 70–99)
GLUCOSE BLDC GLUCOMTR-MCNC: 92 MG/DL — SIGNIFICANT CHANGE UP (ref 70–99)
GLUCOSE BLDC GLUCOMTR-MCNC: 93 MG/DL — SIGNIFICANT CHANGE UP (ref 70–99)
GLUCOSE BLDC GLUCOMTR-MCNC: 93 MG/DL — SIGNIFICANT CHANGE UP (ref 70–99)
GLUCOSE BLDC GLUCOMTR-MCNC: 94 MG/DL — SIGNIFICANT CHANGE UP (ref 70–99)
GLUCOSE BLDC GLUCOMTR-MCNC: 95 MG/DL — SIGNIFICANT CHANGE UP (ref 70–99)
GLUCOSE BLDC GLUCOMTR-MCNC: 96 MG/DL — SIGNIFICANT CHANGE UP (ref 70–99)
GLUCOSE BLDC GLUCOMTR-MCNC: 97 MG/DL — SIGNIFICANT CHANGE UP (ref 70–99)
GLUCOSE BLDC GLUCOMTR-MCNC: 98 MG/DL — SIGNIFICANT CHANGE UP (ref 70–99)
GLUCOSE BLDC GLUCOMTR-MCNC: 98 MG/DL — SIGNIFICANT CHANGE UP (ref 70–99)
GLUCOSE BLDC GLUCOMTR-MCNC: 99 MG/DL — SIGNIFICANT CHANGE UP (ref 70–99)
GLUCOSE SERPL-MCNC: 100 MG/DL — HIGH (ref 70–99)
GLUCOSE SERPL-MCNC: 101 MG/DL — HIGH (ref 70–99)
GLUCOSE SERPL-MCNC: 101 MG/DL — HIGH (ref 70–99)
GLUCOSE SERPL-MCNC: 102 MG/DL — HIGH (ref 70–99)
GLUCOSE SERPL-MCNC: 103 MG/DL — HIGH (ref 70–99)
GLUCOSE SERPL-MCNC: 103 MG/DL — HIGH (ref 70–99)
GLUCOSE SERPL-MCNC: 105 MG/DL — HIGH (ref 70–99)
GLUCOSE SERPL-MCNC: 108 MG/DL — HIGH (ref 70–99)
GLUCOSE SERPL-MCNC: 108 MG/DL — HIGH (ref 70–99)
GLUCOSE SERPL-MCNC: 109 MG/DL — HIGH (ref 70–99)
GLUCOSE SERPL-MCNC: 111 MG/DL — HIGH (ref 70–99)
GLUCOSE SERPL-MCNC: 114 MG/DL — HIGH (ref 70–99)
GLUCOSE SERPL-MCNC: 114 MG/DL — HIGH (ref 70–99)
GLUCOSE SERPL-MCNC: 115 MG/DL — HIGH (ref 70–99)
GLUCOSE SERPL-MCNC: 121 MG/DL — HIGH (ref 70–99)
GLUCOSE SERPL-MCNC: 124 MG/DL — HIGH (ref 70–99)
GLUCOSE SERPL-MCNC: 125 MG/DL — HIGH (ref 70–99)
GLUCOSE SERPL-MCNC: 126 MG/DL — HIGH (ref 70–99)
GLUCOSE SERPL-MCNC: 126 MG/DL — HIGH (ref 70–99)
GLUCOSE SERPL-MCNC: 128 MG/DL — HIGH (ref 70–99)
GLUCOSE SERPL-MCNC: 130 MG/DL — HIGH (ref 70–99)
GLUCOSE SERPL-MCNC: 134 MG/DL — HIGH (ref 70–99)
GLUCOSE SERPL-MCNC: 135 MG/DL — HIGH (ref 70–99)
GLUCOSE SERPL-MCNC: 136 MG/DL — HIGH (ref 70–99)
GLUCOSE SERPL-MCNC: 139 MG/DL — HIGH (ref 70–99)
GLUCOSE SERPL-MCNC: 139 MG/DL — HIGH (ref 70–99)
GLUCOSE SERPL-MCNC: 140 MG/DL — HIGH (ref 70–99)
GLUCOSE SERPL-MCNC: 143 MG/DL — HIGH (ref 70–99)
GLUCOSE SERPL-MCNC: 145 MG/DL — HIGH (ref 70–99)
GLUCOSE SERPL-MCNC: 85 MG/DL — SIGNIFICANT CHANGE UP (ref 70–99)
GLUCOSE SERPL-MCNC: 94 MG/DL — SIGNIFICANT CHANGE UP (ref 70–99)
GLUCOSE SERPL-MCNC: 95 MG/DL — SIGNIFICANT CHANGE UP (ref 70–99)
GLUCOSE SERPL-MCNC: 96 MG/DL — SIGNIFICANT CHANGE UP (ref 70–99)
GLUCOSE SERPL-MCNC: 97 MG/DL — SIGNIFICANT CHANGE UP (ref 70–99)
GLUCOSE SERPL-MCNC: 97 MG/DL — SIGNIFICANT CHANGE UP (ref 70–99)
GLUCOSE SERPL-MCNC: 98 MG/DL — SIGNIFICANT CHANGE UP (ref 70–99)
GLUCOSE SERPL-MCNC: 99 MG/DL — SIGNIFICANT CHANGE UP (ref 70–99)
GLUCOSE UR QL: NEGATIVE — SIGNIFICANT CHANGE UP
GRAN CASTS # UR COMP ASSIST: ABNORMAL /LPF
HAPTOGLOB SERPL-MCNC: 255 MG/DL — HIGH (ref 34–200)
HCO3 BLDA-SCNC: 20 MMOL/L — LOW (ref 21–28)
HCO3 BLDA-SCNC: 21 MMOL/L — SIGNIFICANT CHANGE UP (ref 21–28)
HCO3 BLDA-SCNC: 22 MMOL/L — SIGNIFICANT CHANGE UP (ref 21–28)
HCO3 BLDA-SCNC: 23 MMOL/L — SIGNIFICANT CHANGE UP (ref 21–28)
HCO3 BLDA-SCNC: 24 MMOL/L — SIGNIFICANT CHANGE UP (ref 21–28)
HCO3 BLDV-SCNC: 18 MMOL/L — LOW (ref 22–29)
HCO3 BLDV-SCNC: 21 MMOL/L — LOW (ref 22–29)
HCO3 BLDV-SCNC: 22 MMOL/L — SIGNIFICANT CHANGE UP (ref 22–29)
HCO3 BLDV-SCNC: 22 MMOL/L — SIGNIFICANT CHANGE UP (ref 22–29)
HCO3 BLDV-SCNC: 23 MMOL/L — SIGNIFICANT CHANGE UP (ref 22–29)
HCO3 BLDV-SCNC: 24 MMOL/L — SIGNIFICANT CHANGE UP (ref 22–29)
HCO3 BLDV-SCNC: 24 MMOL/L — SIGNIFICANT CHANGE UP (ref 22–29)
HCO3 BLDV-SCNC: 25 MMOL/L — SIGNIFICANT CHANGE UP (ref 22–29)
HCO3 BLDV-SCNC: 26 MMOL/L — SIGNIFICANT CHANGE UP (ref 22–29)
HCT VFR BLD CALC: 21.4 % — LOW (ref 39–50)
HCT VFR BLD CALC: 21.9 % — LOW (ref 39–50)
HCT VFR BLD CALC: 22.5 % — LOW (ref 39–50)
HCT VFR BLD CALC: 23.4 % — LOW (ref 39–50)
HCT VFR BLD CALC: 23.7 % — LOW (ref 39–50)
HCT VFR BLD CALC: 23.7 % — LOW (ref 39–50)
HCT VFR BLD CALC: 23.9 % — LOW (ref 39–50)
HCT VFR BLD CALC: 24.1 % — LOW (ref 39–50)
HCT VFR BLD CALC: 24.2 % — LOW (ref 39–50)
HCT VFR BLD CALC: 24.2 % — LOW (ref 39–50)
HCT VFR BLD CALC: 24.3 % — LOW (ref 39–50)
HCT VFR BLD CALC: 24.5 % — LOW (ref 39–50)
HCT VFR BLD CALC: 24.8 % — LOW (ref 39–50)
HCT VFR BLD CALC: 24.9 % — LOW (ref 39–50)
HCT VFR BLD CALC: 25.3 % — LOW (ref 39–50)
HCT VFR BLD CALC: 25.8 % — LOW (ref 39–50)
HCT VFR BLD CALC: 26.2 % — LOW (ref 39–50)
HCT VFR BLD CALC: 26.6 % — LOW (ref 39–50)
HCT VFR BLD CALC: 27.8 % — LOW (ref 39–50)
HCT VFR BLD CALC: 28 % — LOW (ref 39–50)
HCT VFR BLD CALC: 28.6 % — LOW (ref 39–50)
HCT VFR BLD CALC: 28.9 % — LOW (ref 39–50)
HCT VFR BLD CALC: 28.9 % — LOW (ref 39–50)
HCT VFR BLD CALC: 29.4 % — LOW (ref 39–50)
HCT VFR BLD CALC: 29.5 % — LOW (ref 39–50)
HCT VFR BLD CALC: 29.6 % — LOW (ref 39–50)
HCT VFR BLD CALC: 29.8 % — LOW (ref 39–50)
HCT VFR BLD CALC: 30.2 % — LOW (ref 39–50)
HCT VFR BLD CALC: 31.1 % — LOW (ref 39–50)
HCT VFR BLD CALC: 31.9 % — LOW (ref 39–50)
HCT VFR BLD CALC: 33.1 % — LOW (ref 39–50)
HCT VFR BLD CALC: 33.5 % — LOW (ref 39–50)
HCT VFR BLD CALC: 33.6 % — LOW (ref 39–50)
HCT VFR BLD CALC: 33.8 % — LOW (ref 39–50)
HCT VFR BLD CALC: 38.2 % — LOW (ref 39–50)
HCT VFR BLD CALC: 39.6 % — SIGNIFICANT CHANGE UP (ref 39–50)
HCT VFR BLD CALC: 39.9 % — SIGNIFICANT CHANGE UP (ref 39–50)
HCT VFR BLD CALC: 40 % — SIGNIFICANT CHANGE UP (ref 39–50)
HCT VFR BLD CALC: 41.2 % — SIGNIFICANT CHANGE UP (ref 39–50)
HCT VFR BLD CALC: 41.7 % — SIGNIFICANT CHANGE UP (ref 39–50)
HCT VFR BLD CALC: 42.3 % — SIGNIFICANT CHANGE UP (ref 39–50)
HCT VFR BLD CALC: 42.9 % — SIGNIFICANT CHANGE UP (ref 39–50)
HCT VFR BLD CALC: 43.2 % — SIGNIFICANT CHANGE UP (ref 39–50)
HCT VFR BLD CALC: 43.7 % — SIGNIFICANT CHANGE UP (ref 39–50)
HCT VFR BLD CALC: 44.8 % — SIGNIFICANT CHANGE UP (ref 39–50)
HCT VFR BLD CALC: 47.7 % — SIGNIFICANT CHANGE UP (ref 39–50)
HCT VFR BLD CALC: 48.2 % — SIGNIFICANT CHANGE UP (ref 39–50)
HCT VFR BLD CALC: 52.2 % — HIGH (ref 39–50)
HCV AB S/CO SERPL IA: 54.36 S/CO — HIGH
HCV AB SERPL-IMP: REACTIVE
HCV RNA FLD QL NAA+PROBE: SIGNIFICANT CHANGE UP
HCV RNA SPEC QL PROBE+SIG AMP: SIGNIFICANT CHANGE UP
HGB BLD-MCNC: 10 G/DL — LOW (ref 13–17)
HGB BLD-MCNC: 10.1 G/DL — LOW (ref 13–17)
HGB BLD-MCNC: 10.2 G/DL — LOW (ref 13–17)
HGB BLD-MCNC: 10.7 G/DL — LOW (ref 13–17)
HGB BLD-MCNC: 10.8 G/DL — LOW (ref 13–17)
HGB BLD-MCNC: 10.8 G/DL — LOW (ref 13–17)
HGB BLD-MCNC: 11 G/DL — LOW (ref 13–17)
HGB BLD-MCNC: 12.9 G/DL — LOW (ref 13–17)
HGB BLD-MCNC: 13.2 G/DL — SIGNIFICANT CHANGE UP (ref 13–17)
HGB BLD-MCNC: 13.2 G/DL — SIGNIFICANT CHANGE UP (ref 13–17)
HGB BLD-MCNC: 13.4 G/DL — SIGNIFICANT CHANGE UP (ref 13–17)
HGB BLD-MCNC: 13.8 G/DL — SIGNIFICANT CHANGE UP (ref 13–17)
HGB BLD-MCNC: 13.9 G/DL — SIGNIFICANT CHANGE UP (ref 13–17)
HGB BLD-MCNC: 14.3 G/DL — SIGNIFICANT CHANGE UP (ref 13–17)
HGB BLD-MCNC: 14.3 G/DL — SIGNIFICANT CHANGE UP (ref 13–17)
HGB BLD-MCNC: 14.6 G/DL — SIGNIFICANT CHANGE UP (ref 13–17)
HGB BLD-MCNC: 14.7 G/DL — SIGNIFICANT CHANGE UP (ref 13–17)
HGB BLD-MCNC: 14.8 G/DL — SIGNIFICANT CHANGE UP (ref 13–17)
HGB BLD-MCNC: 16 G/DL — SIGNIFICANT CHANGE UP (ref 13–17)
HGB BLD-MCNC: 16.1 G/DL — SIGNIFICANT CHANGE UP (ref 13–17)
HGB BLD-MCNC: 17.1 G/DL — HIGH (ref 13–17)
HGB BLD-MCNC: 7.3 G/DL — LOW (ref 13–17)
HGB BLD-MCNC: 7.5 G/DL — LOW (ref 13–17)
HGB BLD-MCNC: 7.8 G/DL — LOW (ref 13–17)
HGB BLD-MCNC: 7.9 G/DL — LOW (ref 13–17)
HGB BLD-MCNC: 7.9 G/DL — LOW (ref 13–17)
HGB BLD-MCNC: 8 G/DL — LOW (ref 13–17)
HGB BLD-MCNC: 8 G/DL — LOW (ref 13–17)
HGB BLD-MCNC: 8.1 G/DL — LOW (ref 13–17)
HGB BLD-MCNC: 8.2 G/DL — LOW (ref 13–17)
HGB BLD-MCNC: 8.3 G/DL — LOW (ref 13–17)
HGB BLD-MCNC: 8.4 G/DL — LOW (ref 13–17)
HGB BLD-MCNC: 8.5 G/DL — LOW (ref 13–17)
HGB BLD-MCNC: 8.8 G/DL — LOW (ref 13–17)
HGB BLD-MCNC: 8.9 G/DL — LOW (ref 13–17)
HGB BLD-MCNC: 9.3 G/DL — LOW (ref 13–17)
HGB BLD-MCNC: 9.5 G/DL — LOW (ref 13–17)
HGB BLD-MCNC: 9.5 G/DL — LOW (ref 13–17)
HGB BLD-MCNC: 9.6 G/DL — LOW (ref 13–17)
HGB BLD-MCNC: 9.6 G/DL — LOW (ref 13–17)
HGB BLD-MCNC: 9.7 G/DL — LOW (ref 13–17)
HGB BLD-MCNC: 9.7 G/DL — LOW (ref 13–17)
HGB BLD-MCNC: 9.8 G/DL — LOW (ref 13–17)
HGB BLD-MCNC: 9.8 G/DL — LOW (ref 13–17)
HGB BLDA-MCNC: 10.8 G/DL — LOW (ref 12.6–17.4)
HGB BLDA-MCNC: 14.1 G/DL — SIGNIFICANT CHANGE UP (ref 12.6–17.4)
HOROWITZ INDEX BLDA+IHG-RTO: 40 — SIGNIFICANT CHANGE UP
HYALINE CASTS # UR AUTO: SIGNIFICANT CHANGE UP /LPF (ref 0–2)
IMM GRANULOCYTES NFR BLD AUTO: 0.4 % — SIGNIFICANT CHANGE UP (ref 0–0.9)
IMM GRANULOCYTES NFR BLD AUTO: 0.4 % — SIGNIFICANT CHANGE UP (ref 0–0.9)
IMM GRANULOCYTES NFR BLD AUTO: 0.5 % — SIGNIFICANT CHANGE UP (ref 0–0.9)
IMM GRANULOCYTES NFR BLD AUTO: 0.9 % — SIGNIFICANT CHANGE UP (ref 0–0.9)
IMM GRANULOCYTES NFR BLD AUTO: 1.2 % — HIGH (ref 0–0.9)
IMM GRANULOCYTES NFR BLD AUTO: 1.5 % — HIGH (ref 0–0.9)
IMM GRANULOCYTES NFR BLD AUTO: 1.8 % — HIGH (ref 0–0.9)
INR BLD: 1.15 — SIGNIFICANT CHANGE UP (ref 0.88–1.16)
INR BLD: 1.15 — SIGNIFICANT CHANGE UP (ref 0.88–1.16)
INR BLD: 1.17 — HIGH (ref 0.88–1.16)
INR BLD: 1.24 — HIGH (ref 0.88–1.16)
INR BLD: 1.25 — HIGH (ref 0.88–1.16)
INR BLD: 1.27 — HIGH (ref 0.88–1.16)
INR BLD: 1.28 — HIGH (ref 0.88–1.16)
INR BLD: 1.32 — HIGH (ref 0.88–1.16)
INR BLD: 1.33 — HIGH (ref 0.88–1.16)
INR BLD: 1.34 — HIGH (ref 0.88–1.16)
INR BLD: 1.34 — HIGH (ref 0.88–1.16)
INR BLD: 1.36 — HIGH (ref 0.88–1.16)
INR BLD: 1.37 — HIGH (ref 0.88–1.16)
INR BLD: 1.37 — HIGH (ref 0.88–1.16)
INR BLD: 1.4 — HIGH (ref 0.88–1.16)
INR BLD: 1.42 — HIGH (ref 0.88–1.16)
INR BLD: 1.85 — HIGH (ref 0.88–1.16)
INR BLD: 2.28 — HIGH (ref 0.88–1.16)
INR BLD: 4.67 — HIGH (ref 0.88–1.16)
INR BLD: 4.78 — HIGH (ref 0.88–1.16)
INR BLD: 4.86 — HIGH (ref 0.88–1.16)
INR BLD: 6.15 — CRITICAL HIGH (ref 0.88–1.16)
INR BLD: 6.45 — CRITICAL HIGH (ref 0.88–1.16)
ISTAT ACTK (ACTIVATED CLOTTING TIME KAOLIN): <50 SEC — LOW (ref 74–137)
KETONES UR-MCNC: ABNORMAL MG/DL
KETONES UR-MCNC: NEGATIVE — SIGNIFICANT CHANGE UP
KETONES UR-MCNC: NEGATIVE — SIGNIFICANT CHANGE UP
LA NT DPL PPP QL: 24.9 SEC — SIGNIFICANT CHANGE UP
LACTATE SERPL-SCNC: 0.9 MMOL/L — SIGNIFICANT CHANGE UP (ref 0.5–2)
LACTATE SERPL-SCNC: 1.1 MMOL/L — SIGNIFICANT CHANGE UP (ref 0.5–2)
LACTATE SERPL-SCNC: 1.1 MMOL/L — SIGNIFICANT CHANGE UP (ref 0.5–2)
LACTATE SERPL-SCNC: 1.2 MMOL/L — SIGNIFICANT CHANGE UP (ref 0.5–2)
LACTATE SERPL-SCNC: 1.2 MMOL/L — SIGNIFICANT CHANGE UP (ref 0.5–2)
LACTATE SERPL-SCNC: 1.3 MMOL/L — SIGNIFICANT CHANGE UP (ref 0.5–2)
LACTATE SERPL-SCNC: 1.4 MMOL/L — SIGNIFICANT CHANGE UP (ref 0.5–2)
LACTATE SERPL-SCNC: 1.4 MMOL/L — SIGNIFICANT CHANGE UP (ref 0.5–2)
LACTATE SERPL-SCNC: 1.5 MMOL/L — SIGNIFICANT CHANGE UP (ref 0.5–2)
LACTATE SERPL-SCNC: 1.5 MMOL/L — SIGNIFICANT CHANGE UP (ref 0.5–2)
LACTATE SERPL-SCNC: 1.6 MMOL/L — SIGNIFICANT CHANGE UP (ref 0.5–2)
LACTATE SERPL-SCNC: 1.6 MMOL/L — SIGNIFICANT CHANGE UP (ref 0.5–2)
LACTATE SERPL-SCNC: 1.7 MMOL/L — SIGNIFICANT CHANGE UP (ref 0.5–2)
LACTATE SERPL-SCNC: 1.8 MMOL/L — SIGNIFICANT CHANGE UP (ref 0.5–2)
LACTATE SERPL-SCNC: 1.8 MMOL/L — SIGNIFICANT CHANGE UP (ref 0.5–2)
LACTATE SERPL-SCNC: 2 MMOL/L — SIGNIFICANT CHANGE UP (ref 0.5–2)
LACTATE SERPL-SCNC: 2.1 MMOL/L — HIGH (ref 0.5–2)
LACTATE SERPL-SCNC: 2.1 MMOL/L — HIGH (ref 0.5–2)
LACTATE SERPL-SCNC: 2.2 MMOL/L — HIGH (ref 0.5–2)
LACTATE SERPL-SCNC: 2.2 MMOL/L — HIGH (ref 0.5–2)
LACTATE SERPL-SCNC: 2.3 MMOL/L — HIGH (ref 0.5–2)
LACTATE SERPL-SCNC: 2.4 MMOL/L — HIGH (ref 0.5–2)
LACTATE SERPL-SCNC: 2.9 MMOL/L — HIGH (ref 0.5–2)
LDH SERPL L TO P-CCNC: 300 U/L — HIGH (ref 50–242)
LEUKOCYTE ESTERASE UR-ACNC: NEGATIVE — SIGNIFICANT CHANGE UP
LYMPHOCYTES # BLD AUTO: 0.18 K/UL — LOW (ref 1–3.3)
LYMPHOCYTES # BLD AUTO: 0.33 K/UL — LOW (ref 1–3.3)
LYMPHOCYTES # BLD AUTO: 0.51 K/UL — LOW (ref 1–3.3)
LYMPHOCYTES # BLD AUTO: 0.61 K/UL — LOW (ref 1–3.3)
LYMPHOCYTES # BLD AUTO: 0.7 K/UL — LOW (ref 1–3.3)
LYMPHOCYTES # BLD AUTO: 0.75 K/UL — LOW (ref 1–3.3)
LYMPHOCYTES # BLD AUTO: 0.79 K/UL — LOW (ref 1–3.3)
LYMPHOCYTES # BLD AUTO: 0.86 K/UL — LOW (ref 1–3.3)
LYMPHOCYTES # BLD AUTO: 0.9 K/UL — LOW (ref 1–3.3)
LYMPHOCYTES # BLD AUTO: 1.1 % — LOW (ref 13–44)
LYMPHOCYTES # BLD AUTO: 10.8 % — LOW (ref 13–44)
LYMPHOCYTES # BLD AUTO: 2.6 % — LOW (ref 13–44)
LYMPHOCYTES # BLD AUTO: 3 % — LOW (ref 13–44)
LYMPHOCYTES # BLD AUTO: 3.9 % — LOW (ref 13–44)
LYMPHOCYTES # BLD AUTO: 4.7 % — LOW (ref 13–44)
LYMPHOCYTES # BLD AUTO: 6.1 % — LOW (ref 13–44)
LYMPHOCYTES # BLD AUTO: 7 % — LOW (ref 13–44)
LYMPHOCYTES # BLD AUTO: 8 % — LOW (ref 13–44)
MACROCYTES BLD QL: SLIGHT — SIGNIFICANT CHANGE UP
MACROCYTES BLD QL: SLIGHT — SIGNIFICANT CHANGE UP
MAGNESIUM SERPL-MCNC: 1.8 MG/DL — SIGNIFICANT CHANGE UP (ref 1.6–2.6)
MAGNESIUM SERPL-MCNC: 1.8 MG/DL — SIGNIFICANT CHANGE UP (ref 1.6–2.6)
MAGNESIUM SERPL-MCNC: 1.9 MG/DL — SIGNIFICANT CHANGE UP (ref 1.6–2.6)
MAGNESIUM SERPL-MCNC: 2 MG/DL — SIGNIFICANT CHANGE UP (ref 1.6–2.6)
MAGNESIUM SERPL-MCNC: 2 MG/DL — SIGNIFICANT CHANGE UP (ref 1.6–2.6)
MAGNESIUM SERPL-MCNC: 2.1 MG/DL — SIGNIFICANT CHANGE UP (ref 1.6–2.6)
MAGNESIUM SERPL-MCNC: 2.2 MG/DL — SIGNIFICANT CHANGE UP (ref 1.6–2.6)
MAGNESIUM SERPL-MCNC: 2.3 MG/DL — SIGNIFICANT CHANGE UP (ref 1.6–2.6)
MAGNESIUM SERPL-MCNC: 2.4 MG/DL — SIGNIFICANT CHANGE UP (ref 1.6–2.6)
MAGNESIUM SERPL-MCNC: 2.5 MG/DL — SIGNIFICANT CHANGE UP (ref 1.6–2.6)
MAGNESIUM SERPL-MCNC: 2.8 MG/DL — HIGH (ref 1.6–2.6)
MANUAL SMEAR VERIFICATION: SIGNIFICANT CHANGE UP
MANUAL SMEAR VERIFICATION: SIGNIFICANT CHANGE UP
MCHC RBC-ENTMCNC: 28.9 PG — SIGNIFICANT CHANGE UP (ref 27–34)
MCHC RBC-ENTMCNC: 29 PG — SIGNIFICANT CHANGE UP (ref 27–34)
MCHC RBC-ENTMCNC: 29.1 PG — SIGNIFICANT CHANGE UP (ref 27–34)
MCHC RBC-ENTMCNC: 29.2 PG — SIGNIFICANT CHANGE UP (ref 27–34)
MCHC RBC-ENTMCNC: 29.2 PG — SIGNIFICANT CHANGE UP (ref 27–34)
MCHC RBC-ENTMCNC: 29.3 PG — SIGNIFICANT CHANGE UP (ref 27–34)
MCHC RBC-ENTMCNC: 29.4 PG — SIGNIFICANT CHANGE UP (ref 27–34)
MCHC RBC-ENTMCNC: 29.5 PG — SIGNIFICANT CHANGE UP (ref 27–34)
MCHC RBC-ENTMCNC: 29.5 PG — SIGNIFICANT CHANGE UP (ref 27–34)
MCHC RBC-ENTMCNC: 29.6 PG — SIGNIFICANT CHANGE UP (ref 27–34)
MCHC RBC-ENTMCNC: 29.7 PG — SIGNIFICANT CHANGE UP (ref 27–34)
MCHC RBC-ENTMCNC: 29.8 PG — SIGNIFICANT CHANGE UP (ref 27–34)
MCHC RBC-ENTMCNC: 29.9 PG — SIGNIFICANT CHANGE UP (ref 27–34)
MCHC RBC-ENTMCNC: 30 PG — SIGNIFICANT CHANGE UP (ref 27–34)
MCHC RBC-ENTMCNC: 30.1 PG — SIGNIFICANT CHANGE UP (ref 27–34)
MCHC RBC-ENTMCNC: 30.1 PG — SIGNIFICANT CHANGE UP (ref 27–34)
MCHC RBC-ENTMCNC: 30.2 PG — SIGNIFICANT CHANGE UP (ref 27–34)
MCHC RBC-ENTMCNC: 30.3 PG — SIGNIFICANT CHANGE UP (ref 27–34)
MCHC RBC-ENTMCNC: 32 GM/DL — SIGNIFICANT CHANGE UP (ref 32–36)
MCHC RBC-ENTMCNC: 32 GM/DL — SIGNIFICANT CHANGE UP (ref 32–36)
MCHC RBC-ENTMCNC: 32.1 GM/DL — SIGNIFICANT CHANGE UP (ref 32–36)
MCHC RBC-ENTMCNC: 32.2 GM/DL — SIGNIFICANT CHANGE UP (ref 32–36)
MCHC RBC-ENTMCNC: 32.2 GM/DL — SIGNIFICANT CHANGE UP (ref 32–36)
MCHC RBC-ENTMCNC: 32.3 GM/DL — SIGNIFICANT CHANGE UP (ref 32–36)
MCHC RBC-ENTMCNC: 32.4 GM/DL — SIGNIFICANT CHANGE UP (ref 32–36)
MCHC RBC-ENTMCNC: 32.5 GM/DL — SIGNIFICANT CHANGE UP (ref 32–36)
MCHC RBC-ENTMCNC: 32.5 GM/DL — SIGNIFICANT CHANGE UP (ref 32–36)
MCHC RBC-ENTMCNC: 32.6 GM/DL — SIGNIFICANT CHANGE UP (ref 32–36)
MCHC RBC-ENTMCNC: 32.7 GM/DL — SIGNIFICANT CHANGE UP (ref 32–36)
MCHC RBC-ENTMCNC: 32.7 GM/DL — SIGNIFICANT CHANGE UP (ref 32–36)
MCHC RBC-ENTMCNC: 32.8 GM/DL — SIGNIFICANT CHANGE UP (ref 32–36)
MCHC RBC-ENTMCNC: 33 GM/DL — SIGNIFICANT CHANGE UP (ref 32–36)
MCHC RBC-ENTMCNC: 33 GM/DL — SIGNIFICANT CHANGE UP (ref 32–36)
MCHC RBC-ENTMCNC: 33.1 GM/DL — SIGNIFICANT CHANGE UP (ref 32–36)
MCHC RBC-ENTMCNC: 33.2 GM/DL — SIGNIFICANT CHANGE UP (ref 32–36)
MCHC RBC-ENTMCNC: 33.2 GM/DL — SIGNIFICANT CHANGE UP (ref 32–36)
MCHC RBC-ENTMCNC: 33.3 GM/DL — SIGNIFICANT CHANGE UP (ref 32–36)
MCHC RBC-ENTMCNC: 33.4 GM/DL — SIGNIFICANT CHANGE UP (ref 32–36)
MCHC RBC-ENTMCNC: 33.5 GM/DL — SIGNIFICANT CHANGE UP (ref 32–36)
MCHC RBC-ENTMCNC: 33.6 GM/DL — SIGNIFICANT CHANGE UP (ref 32–36)
MCHC RBC-ENTMCNC: 33.8 GM/DL — SIGNIFICANT CHANGE UP (ref 32–36)
MCHC RBC-ENTMCNC: 33.8 GM/DL — SIGNIFICANT CHANGE UP (ref 32–36)
MCHC RBC-ENTMCNC: 33.9 GM/DL — SIGNIFICANT CHANGE UP (ref 32–36)
MCHC RBC-ENTMCNC: 34 GM/DL — SIGNIFICANT CHANGE UP (ref 32–36)
MCHC RBC-ENTMCNC: 34.3 GM/DL — SIGNIFICANT CHANGE UP (ref 32–36)
MCHC RBC-ENTMCNC: 34.3 GM/DL — SIGNIFICANT CHANGE UP (ref 32–36)
MCHC RBC-ENTMCNC: 34.5 GM/DL — SIGNIFICANT CHANGE UP (ref 32–36)
MCHC RBC-ENTMCNC: 34.8 GM/DL — SIGNIFICANT CHANGE UP (ref 32–36)
MCHC RBC-ENTMCNC: 35 GM/DL — SIGNIFICANT CHANGE UP (ref 32–36)
MCHC RBC-ENTMCNC: 35.6 GM/DL — SIGNIFICANT CHANGE UP (ref 32–36)
MCV RBC AUTO: 85.2 FL — SIGNIFICANT CHANGE UP (ref 80–100)
MCV RBC AUTO: 85.4 FL — SIGNIFICANT CHANGE UP (ref 80–100)
MCV RBC AUTO: 85.7 FL — SIGNIFICANT CHANGE UP (ref 80–100)
MCV RBC AUTO: 85.9 FL — SIGNIFICANT CHANGE UP (ref 80–100)
MCV RBC AUTO: 86.3 FL — SIGNIFICANT CHANGE UP (ref 80–100)
MCV RBC AUTO: 86.6 FL — SIGNIFICANT CHANGE UP (ref 80–100)
MCV RBC AUTO: 87.1 FL — SIGNIFICANT CHANGE UP (ref 80–100)
MCV RBC AUTO: 87.3 FL — SIGNIFICANT CHANGE UP (ref 80–100)
MCV RBC AUTO: 87.3 FL — SIGNIFICANT CHANGE UP (ref 80–100)
MCV RBC AUTO: 87.5 FL — SIGNIFICANT CHANGE UP (ref 80–100)
MCV RBC AUTO: 87.7 FL — SIGNIFICANT CHANGE UP (ref 80–100)
MCV RBC AUTO: 87.8 FL — SIGNIFICANT CHANGE UP (ref 80–100)
MCV RBC AUTO: 88 FL — SIGNIFICANT CHANGE UP (ref 80–100)
MCV RBC AUTO: 88.1 FL — SIGNIFICANT CHANGE UP (ref 80–100)
MCV RBC AUTO: 88.2 FL — SIGNIFICANT CHANGE UP (ref 80–100)
MCV RBC AUTO: 88.4 FL — SIGNIFICANT CHANGE UP (ref 80–100)
MCV RBC AUTO: 88.5 FL — SIGNIFICANT CHANGE UP (ref 80–100)
MCV RBC AUTO: 88.5 FL — SIGNIFICANT CHANGE UP (ref 80–100)
MCV RBC AUTO: 88.7 FL — SIGNIFICANT CHANGE UP (ref 80–100)
MCV RBC AUTO: 88.9 FL — SIGNIFICANT CHANGE UP (ref 80–100)
MCV RBC AUTO: 88.9 FL — SIGNIFICANT CHANGE UP (ref 80–100)
MCV RBC AUTO: 89 FL — SIGNIFICANT CHANGE UP (ref 80–100)
MCV RBC AUTO: 89.2 FL — SIGNIFICANT CHANGE UP (ref 80–100)
MCV RBC AUTO: 89.4 FL — SIGNIFICANT CHANGE UP (ref 80–100)
MCV RBC AUTO: 89.5 FL — SIGNIFICANT CHANGE UP (ref 80–100)
MCV RBC AUTO: 89.6 FL — SIGNIFICANT CHANGE UP (ref 80–100)
MCV RBC AUTO: 89.7 FL — SIGNIFICANT CHANGE UP (ref 80–100)
MCV RBC AUTO: 89.7 FL — SIGNIFICANT CHANGE UP (ref 80–100)
MCV RBC AUTO: 90.1 FL — SIGNIFICANT CHANGE UP (ref 80–100)
MCV RBC AUTO: 90.5 FL — SIGNIFICANT CHANGE UP (ref 80–100)
MCV RBC AUTO: 90.6 FL — SIGNIFICANT CHANGE UP (ref 80–100)
MCV RBC AUTO: 91.2 FL — SIGNIFICANT CHANGE UP (ref 80–100)
MCV RBC AUTO: 91.3 FL — SIGNIFICANT CHANGE UP (ref 80–100)
MCV RBC AUTO: 91.5 FL — SIGNIFICANT CHANGE UP (ref 80–100)
MCV RBC AUTO: 91.6 FL — SIGNIFICANT CHANGE UP (ref 80–100)
MCV RBC AUTO: 91.7 FL — SIGNIFICANT CHANGE UP (ref 80–100)
MCV RBC AUTO: 91.7 FL — SIGNIFICANT CHANGE UP (ref 80–100)
MCV RBC AUTO: 91.8 FL — SIGNIFICANT CHANGE UP (ref 80–100)
MCV RBC AUTO: 92 FL — SIGNIFICANT CHANGE UP (ref 80–100)
MCV RBC AUTO: 92.1 FL — SIGNIFICANT CHANGE UP (ref 80–100)
MCV RBC AUTO: 92.2 FL — SIGNIFICANT CHANGE UP (ref 80–100)
MCV RBC AUTO: 92.7 FL — SIGNIFICANT CHANGE UP (ref 80–100)
METHADONE UR-MCNC: NEGATIVE — SIGNIFICANT CHANGE UP
METHGB MFR BLDA: 0.6 % — SIGNIFICANT CHANGE UP
METHGB MFR BLDA: 1 % — SIGNIFICANT CHANGE UP
MONOCYTES # BLD AUTO: 0.44 K/UL — SIGNIFICANT CHANGE UP (ref 0–0.9)
MONOCYTES # BLD AUTO: 0.53 K/UL — SIGNIFICANT CHANGE UP (ref 0–0.9)
MONOCYTES # BLD AUTO: 0.58 K/UL — SIGNIFICANT CHANGE UP (ref 0–0.9)
MONOCYTES # BLD AUTO: 0.63 K/UL — SIGNIFICANT CHANGE UP (ref 0–0.9)
MONOCYTES # BLD AUTO: 0.73 K/UL — SIGNIFICANT CHANGE UP (ref 0–0.9)
MONOCYTES # BLD AUTO: 0.76 K/UL — SIGNIFICANT CHANGE UP (ref 0–0.9)
MONOCYTES # BLD AUTO: 0.93 K/UL — HIGH (ref 0–0.9)
MONOCYTES # BLD AUTO: 0.98 K/UL — HIGH (ref 0–0.9)
MONOCYTES # BLD AUTO: 1.37 K/UL — HIGH (ref 0–0.9)
MONOCYTES NFR BLD AUTO: 10.5 % — SIGNIFICANT CHANGE UP (ref 2–14)
MONOCYTES NFR BLD AUTO: 2.9 % — SIGNIFICANT CHANGE UP (ref 2–14)
MONOCYTES NFR BLD AUTO: 3.5 % — SIGNIFICANT CHANGE UP (ref 2–14)
MONOCYTES NFR BLD AUTO: 3.7 % — SIGNIFICANT CHANGE UP (ref 2–14)
MONOCYTES NFR BLD AUTO: 3.8 % — SIGNIFICANT CHANGE UP (ref 2–14)
MONOCYTES NFR BLD AUTO: 6.6 % — SIGNIFICANT CHANGE UP (ref 2–14)
MONOCYTES NFR BLD AUTO: 8.3 % — SIGNIFICANT CHANGE UP (ref 2–14)
MONOCYTES NFR BLD AUTO: 8.5 % — SIGNIFICANT CHANGE UP (ref 2–14)
MONOCYTES NFR BLD AUTO: 8.7 % — SIGNIFICANT CHANGE UP (ref 2–14)
MYOGLOBIN SERPL-MCNC: 1267 NG/ML — HIGH (ref 16–96)
MYOGLOBIN SERPL-MCNC: 1409 NG/ML — HIGH (ref 16–96)
MYOGLOBIN SERPL-MCNC: 825 NG/ML — HIGH (ref 16–96)
NEUTROPHILS # BLD AUTO: 11.82 K/UL — HIGH (ref 1.8–7.4)
NEUTROPHILS # BLD AUTO: 14.09 K/UL — HIGH (ref 1.8–7.4)
NEUTROPHILS # BLD AUTO: 15.24 K/UL — HIGH (ref 1.8–7.4)
NEUTROPHILS # BLD AUTO: 17.3 K/UL — HIGH (ref 1.8–7.4)
NEUTROPHILS # BLD AUTO: 18.84 K/UL — HIGH (ref 1.8–7.4)
NEUTROPHILS # BLD AUTO: 5.87 K/UL — SIGNIFICANT CHANGE UP (ref 1.8–7.4)
NEUTROPHILS # BLD AUTO: 6.51 K/UL — SIGNIFICANT CHANGE UP (ref 1.8–7.4)
NEUTROPHILS # BLD AUTO: 9.35 K/UL — HIGH (ref 1.8–7.4)
NEUTROPHILS # BLD AUTO: 9.79 K/UL — HIGH (ref 1.8–7.4)
NEUTROPHILS NFR BLD AUTO: 71.9 % — SIGNIFICANT CHANGE UP (ref 43–77)
NEUTROPHILS NFR BLD AUTO: 81.7 % — HIGH (ref 43–77)
NEUTROPHILS NFR BLD AUTO: 83.1 % — HIGH (ref 43–77)
NEUTROPHILS NFR BLD AUTO: 84.7 % — HIGH (ref 43–77)
NEUTROPHILS NFR BLD AUTO: 89 % — HIGH (ref 43–77)
NEUTROPHILS NFR BLD AUTO: 89.8 % — HIGH (ref 43–77)
NEUTROPHILS NFR BLD AUTO: 90.2 % — HIGH (ref 43–77)
NEUTROPHILS NFR BLD AUTO: 92.8 % — HIGH (ref 43–77)
NEUTROPHILS NFR BLD AUTO: 93.9 % — HIGH (ref 43–77)
NEUTS BAND # BLD: 8.8 % — HIGH (ref 0–8)
NITRITE UR-MCNC: NEGATIVE — SIGNIFICANT CHANGE UP
NITRITE UR-MCNC: POSITIVE
NITRITE UR-MCNC: POSITIVE
NRBC # BLD: 0 /100 WBCS — SIGNIFICANT CHANGE UP (ref 0–0)
NRBC # BLD: 2 /100 — HIGH (ref 0–0)
NRBC # BLD: SIGNIFICANT CHANGE UP /100 WBCS (ref 0–0)
NT-PROBNP SERPL-SCNC: HIGH PG/ML (ref 0–300)
NT-PROBNP SERPL-SCNC: HIGH PG/ML (ref 0–300)
OPIATES UR-MCNC: POSITIVE
OSMOLALITY UR: 581 MOSM/KG — SIGNIFICANT CHANGE UP (ref 300–900)
OSMOLALITY UR: 643 MOSM/KG — SIGNIFICANT CHANGE UP (ref 300–900)
OVALOCYTES BLD QL SMEAR: SLIGHT — SIGNIFICANT CHANGE UP
OVALOCYTES BLD QL SMEAR: SLIGHT — SIGNIFICANT CHANGE UP
OXYHGB MFR BLDA: 97.2 % — HIGH (ref 90–95)
OXYHGB MFR BLDA: 97.9 % — HIGH (ref 90–95)
PCO2 BLDA: 26 MMHG — LOW (ref 35–48)
PCO2 BLDA: 27 MMHG — LOW (ref 35–48)
PCO2 BLDA: 28 MMHG — LOW (ref 35–48)
PCO2 BLDA: 30 MMHG — LOW (ref 35–48)
PCO2 BLDA: 30 MMHG — LOW (ref 35–48)
PCO2 BLDA: 32 MMHG — LOW (ref 35–48)
PCO2 BLDA: 34 MMHG — LOW (ref 35–48)
PCO2 BLDA: 36 MMHG — SIGNIFICANT CHANGE UP (ref 35–48)
PCO2 BLDA: 37 MMHG — SIGNIFICANT CHANGE UP (ref 35–48)
PCO2 BLDA: 37 MMHG — SIGNIFICANT CHANGE UP (ref 35–48)
PCO2 BLDA: 38 MMHG — SIGNIFICANT CHANGE UP (ref 35–48)
PCO2 BLDA: 39 MMHG — SIGNIFICANT CHANGE UP (ref 35–48)
PCO2 BLDA: 40 MMHG — SIGNIFICANT CHANGE UP (ref 35–48)
PCO2 BLDA: 44 MMHG — SIGNIFICANT CHANGE UP (ref 35–48)
PCO2 BLDA: 49 MMHG — HIGH (ref 35–48)
PCO2 BLDV: 36 MMHG — LOW (ref 42–55)
PCO2 BLDV: 40 MMHG — LOW (ref 42–55)
PCO2 BLDV: 41 MMHG — LOW (ref 42–55)
PCO2 BLDV: 43 MMHG — SIGNIFICANT CHANGE UP (ref 42–55)
PCO2 BLDV: 44 MMHG — SIGNIFICANT CHANGE UP (ref 42–55)
PCO2 BLDV: 44 MMHG — SIGNIFICANT CHANGE UP (ref 42–55)
PCO2 BLDV: 46 MMHG — SIGNIFICANT CHANGE UP (ref 42–55)
PCO2 BLDV: 47 MMHG — SIGNIFICANT CHANGE UP (ref 42–55)
PCO2 BLDV: 47 MMHG — SIGNIFICANT CHANGE UP (ref 42–55)
PCO2 BLDV: 48 MMHG — SIGNIFICANT CHANGE UP (ref 42–55)
PCO2 BLDV: 53 MMHG — SIGNIFICANT CHANGE UP (ref 42–55)
PCP SPEC-MCNC: SIGNIFICANT CHANGE UP
PCP UR-MCNC: NEGATIVE — SIGNIFICANT CHANGE UP
PH BLDA: 7.29 — LOW (ref 7.35–7.45)
PH BLDA: 7.35 — SIGNIFICANT CHANGE UP (ref 7.35–7.45)
PH BLDA: 7.36 — SIGNIFICANT CHANGE UP (ref 7.35–7.45)
PH BLDA: 7.36 — SIGNIFICANT CHANGE UP (ref 7.35–7.45)
PH BLDA: 7.37 — SIGNIFICANT CHANGE UP (ref 7.35–7.45)
PH BLDA: 7.37 — SIGNIFICANT CHANGE UP (ref 7.35–7.45)
PH BLDA: 7.38 — SIGNIFICANT CHANGE UP (ref 7.35–7.45)
PH BLDA: 7.38 — SIGNIFICANT CHANGE UP (ref 7.35–7.45)
PH BLDA: 7.39 — SIGNIFICANT CHANGE UP (ref 7.35–7.45)
PH BLDA: 7.39 — SIGNIFICANT CHANGE UP (ref 7.35–7.45)
PH BLDA: 7.4 — SIGNIFICANT CHANGE UP (ref 7.35–7.45)
PH BLDA: 7.45 — SIGNIFICANT CHANGE UP (ref 7.35–7.45)
PH BLDA: 7.47 — HIGH (ref 7.35–7.45)
PH BLDA: 7.48 — HIGH (ref 7.35–7.45)
PH BLDA: 7.5 — HIGH (ref 7.35–7.45)
PH BLDA: 7.53 — HIGH (ref 7.35–7.45)
PH BLDA: 7.54 — HIGH (ref 7.35–7.45)
PH BLDV: 7.25 — LOW (ref 7.32–7.43)
PH BLDV: 7.29 — LOW (ref 7.32–7.43)
PH BLDV: 7.29 — LOW (ref 7.32–7.43)
PH BLDV: 7.31 — LOW (ref 7.32–7.43)
PH BLDV: 7.31 — LOW (ref 7.32–7.43)
PH BLDV: 7.32 — SIGNIFICANT CHANGE UP (ref 7.32–7.43)
PH BLDV: 7.33 — SIGNIFICANT CHANGE UP (ref 7.32–7.43)
PH BLDV: 7.35 — SIGNIFICANT CHANGE UP (ref 7.32–7.43)
PH BLDV: 7.37 — SIGNIFICANT CHANGE UP (ref 7.32–7.43)
PH BLDV: 7.37 — SIGNIFICANT CHANGE UP (ref 7.32–7.43)
PH BLDV: 7.4 — SIGNIFICANT CHANGE UP (ref 7.32–7.43)
PH UR: 5.5 — SIGNIFICANT CHANGE UP (ref 5–8)
PHOSPHATE SERPL-MCNC: 1.5 MG/DL — LOW (ref 2.5–4.5)
PHOSPHATE SERPL-MCNC: 1.7 MG/DL — LOW (ref 2.5–4.5)
PHOSPHATE SERPL-MCNC: 1.8 MG/DL — LOW (ref 2.5–4.5)
PHOSPHATE SERPL-MCNC: 2.1 MG/DL — LOW (ref 2.5–4.5)
PHOSPHATE SERPL-MCNC: 2.2 MG/DL — LOW (ref 2.5–4.5)
PHOSPHATE SERPL-MCNC: 2.3 MG/DL — LOW (ref 2.5–4.5)
PHOSPHATE SERPL-MCNC: 2.4 MG/DL — LOW (ref 2.5–4.5)
PHOSPHATE SERPL-MCNC: 2.4 MG/DL — LOW (ref 2.5–4.5)
PHOSPHATE SERPL-MCNC: 2.5 MG/DL — SIGNIFICANT CHANGE UP (ref 2.5–4.5)
PHOSPHATE SERPL-MCNC: 2.6 MG/DL — SIGNIFICANT CHANGE UP (ref 2.5–4.5)
PHOSPHATE SERPL-MCNC: 2.7 MG/DL — SIGNIFICANT CHANGE UP (ref 2.5–4.5)
PHOSPHATE SERPL-MCNC: 2.8 MG/DL — SIGNIFICANT CHANGE UP (ref 2.5–4.5)
PHOSPHATE SERPL-MCNC: 2.9 MG/DL — SIGNIFICANT CHANGE UP (ref 2.5–4.5)
PHOSPHATE SERPL-MCNC: 3.2 MG/DL — SIGNIFICANT CHANGE UP (ref 2.5–4.5)
PHOSPHATE SERPL-MCNC: 3.7 MG/DL — SIGNIFICANT CHANGE UP (ref 2.5–4.5)
PHOSPHATE SERPL-MCNC: 3.8 MG/DL — SIGNIFICANT CHANGE UP (ref 2.5–4.5)
PHOSPHATE SERPL-MCNC: 4.7 MG/DL — HIGH (ref 2.5–4.5)
PLAT MORPH BLD: ABNORMAL
PLAT MORPH BLD: ABNORMAL
PLATELET # BLD AUTO: 204 K/UL — SIGNIFICANT CHANGE UP (ref 150–400)
PLATELET # BLD AUTO: 206 K/UL — SIGNIFICANT CHANGE UP (ref 150–400)
PLATELET # BLD AUTO: 206 K/UL — SIGNIFICANT CHANGE UP (ref 150–400)
PLATELET # BLD AUTO: 209 K/UL — SIGNIFICANT CHANGE UP (ref 150–400)
PLATELET # BLD AUTO: 209 K/UL — SIGNIFICANT CHANGE UP (ref 150–400)
PLATELET # BLD AUTO: 210 K/UL — SIGNIFICANT CHANGE UP (ref 150–400)
PLATELET # BLD AUTO: 225 K/UL — SIGNIFICANT CHANGE UP (ref 150–400)
PLATELET # BLD AUTO: 230 K/UL — SIGNIFICANT CHANGE UP (ref 150–400)
PLATELET # BLD AUTO: 236 K/UL — SIGNIFICANT CHANGE UP (ref 150–400)
PLATELET # BLD AUTO: 248 K/UL — SIGNIFICANT CHANGE UP (ref 150–400)
PLATELET # BLD AUTO: 250 K/UL — SIGNIFICANT CHANGE UP (ref 150–400)
PLATELET # BLD AUTO: 250 K/UL — SIGNIFICANT CHANGE UP (ref 150–400)
PLATELET # BLD AUTO: 255 K/UL — SIGNIFICANT CHANGE UP (ref 150–400)
PLATELET # BLD AUTO: 258 K/UL — SIGNIFICANT CHANGE UP (ref 150–400)
PLATELET # BLD AUTO: 260 K/UL — SIGNIFICANT CHANGE UP (ref 150–400)
PLATELET # BLD AUTO: 262 K/UL — SIGNIFICANT CHANGE UP (ref 150–400)
PLATELET # BLD AUTO: 270 K/UL — SIGNIFICANT CHANGE UP (ref 150–400)
PLATELET # BLD AUTO: 270 K/UL — SIGNIFICANT CHANGE UP (ref 150–400)
PLATELET # BLD AUTO: 272 K/UL — SIGNIFICANT CHANGE UP (ref 150–400)
PLATELET # BLD AUTO: 274 K/UL — SIGNIFICANT CHANGE UP (ref 150–400)
PLATELET # BLD AUTO: 275 K/UL — SIGNIFICANT CHANGE UP (ref 150–400)
PLATELET # BLD AUTO: 276 K/UL — SIGNIFICANT CHANGE UP (ref 150–400)
PLATELET # BLD AUTO: 287 K/UL — SIGNIFICANT CHANGE UP (ref 150–400)
PLATELET # BLD AUTO: 289 K/UL — SIGNIFICANT CHANGE UP (ref 150–400)
PLATELET # BLD AUTO: 296 K/UL — SIGNIFICANT CHANGE UP (ref 150–400)
PLATELET # BLD AUTO: 297 K/UL — SIGNIFICANT CHANGE UP (ref 150–400)
PLATELET # BLD AUTO: 298 K/UL — SIGNIFICANT CHANGE UP (ref 150–400)
PLATELET # BLD AUTO: 300 K/UL — SIGNIFICANT CHANGE UP (ref 150–400)
PLATELET # BLD AUTO: 300 K/UL — SIGNIFICANT CHANGE UP (ref 150–400)
PLATELET # BLD AUTO: 303 K/UL — SIGNIFICANT CHANGE UP (ref 150–400)
PLATELET # BLD AUTO: 303 K/UL — SIGNIFICANT CHANGE UP (ref 150–400)
PLATELET # BLD AUTO: 309 K/UL — SIGNIFICANT CHANGE UP (ref 150–400)
PLATELET # BLD AUTO: 313 K/UL — SIGNIFICANT CHANGE UP (ref 150–400)
PLATELET # BLD AUTO: 314 K/UL — SIGNIFICANT CHANGE UP (ref 150–400)
PLATELET # BLD AUTO: 328 K/UL — SIGNIFICANT CHANGE UP (ref 150–400)
PLATELET # BLD AUTO: 333 K/UL — SIGNIFICANT CHANGE UP (ref 150–400)
PLATELET # BLD AUTO: 337 K/UL — SIGNIFICANT CHANGE UP (ref 150–400)
PLATELET # BLD AUTO: 365 K/UL — SIGNIFICANT CHANGE UP (ref 150–400)
PLATELET # BLD AUTO: 369 K/UL — SIGNIFICANT CHANGE UP (ref 150–400)
PLATELET # BLD AUTO: 373 K/UL — SIGNIFICANT CHANGE UP (ref 150–400)
PLATELET # BLD AUTO: 374 K/UL — SIGNIFICANT CHANGE UP (ref 150–400)
PLATELET # BLD AUTO: 397 K/UL — SIGNIFICANT CHANGE UP (ref 150–400)
PLATELET # BLD AUTO: 400 K/UL — SIGNIFICANT CHANGE UP (ref 150–400)
PLATELET # BLD AUTO: 403 K/UL — HIGH (ref 150–400)
PLATELET # BLD AUTO: 403 K/UL — HIGH (ref 150–400)
PLATELET # BLD AUTO: 416 K/UL — HIGH (ref 150–400)
PO2 BLDA: 104 MMHG — SIGNIFICANT CHANGE UP (ref 83–108)
PO2 BLDA: 105 MMHG — SIGNIFICANT CHANGE UP (ref 83–108)
PO2 BLDA: 115 MMHG — HIGH (ref 83–108)
PO2 BLDA: 118 MMHG — HIGH (ref 83–108)
PO2 BLDA: 146 MMHG — HIGH (ref 83–108)
PO2 BLDA: 148 MMHG — HIGH (ref 83–108)
PO2 BLDA: 166 MMHG — HIGH (ref 83–108)
PO2 BLDA: 172 MMHG — HIGH (ref 83–108)
PO2 BLDA: 173 MMHG — HIGH (ref 83–108)
PO2 BLDA: 181 MMHG — HIGH (ref 83–108)
PO2 BLDA: 185 MMHG — HIGH (ref 83–108)
PO2 BLDA: 186 MMHG — HIGH (ref 83–108)
PO2 BLDA: 195 MMHG — HIGH (ref 83–108)
PO2 BLDA: 197 MMHG — HIGH (ref 83–108)
PO2 BLDA: 225 MMHG — HIGH (ref 83–108)
PO2 BLDA: 249 MMHG — HIGH (ref 83–108)
PO2 BLDA: 287 MMHG — HIGH (ref 83–108)
PO2 BLDA: 319 MMHG — HIGH (ref 83–108)
PO2 BLDA: 399 MMHG — HIGH (ref 83–108)
PO2 BLDV: 37 MMHG — SIGNIFICANT CHANGE UP (ref 25–45)
PO2 BLDV: 38 MMHG — SIGNIFICANT CHANGE UP (ref 25–45)
PO2 BLDV: 39 MMHG — SIGNIFICANT CHANGE UP (ref 25–45)
PO2 BLDV: 41 MMHG — SIGNIFICANT CHANGE UP (ref 25–45)
PO2 BLDV: 41 MMHG — SIGNIFICANT CHANGE UP (ref 25–45)
PO2 BLDV: 43 MMHG — SIGNIFICANT CHANGE UP (ref 25–45)
PO2 BLDV: 43 MMHG — SIGNIFICANT CHANGE UP (ref 25–45)
PO2 BLDV: 45 MMHG — SIGNIFICANT CHANGE UP (ref 25–45)
PO2 BLDV: 49 MMHG — HIGH (ref 25–45)
PO2 BLDV: 53 MMHG — HIGH (ref 25–45)
PO2 BLDV: 60 MMHG — HIGH (ref 25–45)
POIKILOCYTOSIS BLD QL AUTO: SIGNIFICANT CHANGE UP
POIKILOCYTOSIS BLD QL AUTO: SIGNIFICANT CHANGE UP
POLYCHROMASIA BLD QL SMEAR: SLIGHT — SIGNIFICANT CHANGE UP
POLYCHROMASIA BLD QL SMEAR: SLIGHT — SIGNIFICANT CHANGE UP
POTASSIUM BLDA-SCNC: 3.6 MMOL/L — SIGNIFICANT CHANGE UP (ref 3.5–5.1)
POTASSIUM BLDA-SCNC: 4 MMOL/L — SIGNIFICANT CHANGE UP (ref 3.5–5.1)
POTASSIUM BLDV-SCNC: 3.6 MMOL/L — SIGNIFICANT CHANGE UP (ref 3.5–5.1)
POTASSIUM BLDV-SCNC: 3.9 MMOL/L — SIGNIFICANT CHANGE UP (ref 3.5–5.1)
POTASSIUM BLDV-SCNC: 3.9 MMOL/L — SIGNIFICANT CHANGE UP (ref 3.5–5.1)
POTASSIUM BLDV-SCNC: 4 MMOL/L — SIGNIFICANT CHANGE UP (ref 3.5–5.1)
POTASSIUM BLDV-SCNC: 4.1 MMOL/L — SIGNIFICANT CHANGE UP (ref 3.5–5.1)
POTASSIUM BLDV-SCNC: 4.2 MMOL/L — SIGNIFICANT CHANGE UP (ref 3.5–5.1)
POTASSIUM BLDV-SCNC: 4.3 MMOL/L — SIGNIFICANT CHANGE UP (ref 3.5–5.1)
POTASSIUM BLDV-SCNC: 4.3 MMOL/L — SIGNIFICANT CHANGE UP (ref 3.5–5.1)
POTASSIUM BLDV-SCNC: 4.5 MMOL/L — SIGNIFICANT CHANGE UP (ref 3.5–5.1)
POTASSIUM BLDV-SCNC: 4.7 MMOL/L — SIGNIFICANT CHANGE UP (ref 3.5–5.1)
POTASSIUM SERPL-MCNC: 3.3 MMOL/L — LOW (ref 3.5–5.3)
POTASSIUM SERPL-MCNC: 3.3 MMOL/L — LOW (ref 3.5–5.3)
POTASSIUM SERPL-MCNC: 3.6 MMOL/L — SIGNIFICANT CHANGE UP (ref 3.5–5.3)
POTASSIUM SERPL-MCNC: 3.7 MMOL/L — SIGNIFICANT CHANGE UP (ref 3.5–5.3)
POTASSIUM SERPL-MCNC: 3.8 MMOL/L — SIGNIFICANT CHANGE UP (ref 3.5–5.3)
POTASSIUM SERPL-MCNC: 3.9 MMOL/L — SIGNIFICANT CHANGE UP (ref 3.5–5.3)
POTASSIUM SERPL-MCNC: 4 MMOL/L — SIGNIFICANT CHANGE UP (ref 3.5–5.3)
POTASSIUM SERPL-MCNC: 4.1 MMOL/L — SIGNIFICANT CHANGE UP (ref 3.5–5.3)
POTASSIUM SERPL-MCNC: 4.2 MMOL/L — SIGNIFICANT CHANGE UP (ref 3.5–5.3)
POTASSIUM SERPL-MCNC: 4.3 MMOL/L — SIGNIFICANT CHANGE UP (ref 3.5–5.3)
POTASSIUM SERPL-MCNC: 4.4 MMOL/L — SIGNIFICANT CHANGE UP (ref 3.5–5.3)
POTASSIUM SERPL-MCNC: 4.4 MMOL/L — SIGNIFICANT CHANGE UP (ref 3.5–5.3)
POTASSIUM SERPL-MCNC: 4.7 MMOL/L — SIGNIFICANT CHANGE UP (ref 3.5–5.3)
POTASSIUM SERPL-MCNC: 4.7 MMOL/L — SIGNIFICANT CHANGE UP (ref 3.5–5.3)
POTASSIUM SERPL-MCNC: SIGNIFICANT CHANGE UP MMOL/L (ref 3.5–5.3)
POTASSIUM SERPL-SCNC: 3.3 MMOL/L — LOW (ref 3.5–5.3)
POTASSIUM SERPL-SCNC: 3.3 MMOL/L — LOW (ref 3.5–5.3)
POTASSIUM SERPL-SCNC: 3.6 MMOL/L — SIGNIFICANT CHANGE UP (ref 3.5–5.3)
POTASSIUM SERPL-SCNC: 3.7 MMOL/L — SIGNIFICANT CHANGE UP (ref 3.5–5.3)
POTASSIUM SERPL-SCNC: 3.8 MMOL/L — SIGNIFICANT CHANGE UP (ref 3.5–5.3)
POTASSIUM SERPL-SCNC: 3.9 MMOL/L — SIGNIFICANT CHANGE UP (ref 3.5–5.3)
POTASSIUM SERPL-SCNC: 4 MMOL/L — SIGNIFICANT CHANGE UP (ref 3.5–5.3)
POTASSIUM SERPL-SCNC: 4.1 MMOL/L — SIGNIFICANT CHANGE UP (ref 3.5–5.3)
POTASSIUM SERPL-SCNC: 4.2 MMOL/L — SIGNIFICANT CHANGE UP (ref 3.5–5.3)
POTASSIUM SERPL-SCNC: 4.3 MMOL/L — SIGNIFICANT CHANGE UP (ref 3.5–5.3)
POTASSIUM SERPL-SCNC: 4.4 MMOL/L — SIGNIFICANT CHANGE UP (ref 3.5–5.3)
POTASSIUM SERPL-SCNC: 4.4 MMOL/L — SIGNIFICANT CHANGE UP (ref 3.5–5.3)
POTASSIUM SERPL-SCNC: 4.7 MMOL/L — SIGNIFICANT CHANGE UP (ref 3.5–5.3)
POTASSIUM SERPL-SCNC: 4.7 MMOL/L — SIGNIFICANT CHANGE UP (ref 3.5–5.3)
POTASSIUM SERPL-SCNC: SIGNIFICANT CHANGE UP MMOL/L (ref 3.5–5.3)
PROT ?TM UR-MCNC: 142 MG/DL — HIGH (ref 0–12)
PROT SERPL-MCNC: 5 G/DL — LOW (ref 6–8.3)
PROT SERPL-MCNC: 5 G/DL — LOW (ref 6–8.3)
PROT SERPL-MCNC: 5.1 G/DL — LOW (ref 6–8.3)
PROT SERPL-MCNC: 5.4 G/DL — LOW (ref 6–8.3)
PROT SERPL-MCNC: 5.5 G/DL — LOW (ref 6–8.3)
PROT SERPL-MCNC: 5.7 G/DL — LOW (ref 6–8.3)
PROT SERPL-MCNC: 5.9 G/DL — LOW (ref 6–8.3)
PROT SERPL-MCNC: 6 G/DL — SIGNIFICANT CHANGE UP (ref 6–8.3)
PROT SERPL-MCNC: 6.2 G/DL — SIGNIFICANT CHANGE UP (ref 6–8.3)
PROT SERPL-MCNC: 6.5 G/DL — SIGNIFICANT CHANGE UP (ref 6–8.3)
PROT SERPL-MCNC: 6.6 G/DL — SIGNIFICANT CHANGE UP (ref 6–8.3)
PROT SERPL-MCNC: 6.7 G/DL — SIGNIFICANT CHANGE UP (ref 6–8.3)
PROT SERPL-MCNC: 6.7 G/DL — SIGNIFICANT CHANGE UP (ref 6–8.3)
PROT SERPL-MCNC: 6.8 G/DL — SIGNIFICANT CHANGE UP (ref 6–8.3)
PROT SERPL-MCNC: 7.1 G/DL — SIGNIFICANT CHANGE UP (ref 6–8.3)
PROT SERPL-MCNC: 7.2 G/DL — SIGNIFICANT CHANGE UP (ref 6–8.3)
PROT SERPL-MCNC: 7.8 G/DL — SIGNIFICANT CHANGE UP (ref 6–8.3)
PROT UR-MCNC: 30 MG/DL
PROT UR-MCNC: 30 MG/DL
PROT UR-MCNC: ABNORMAL MG/DL
PROT/CREAT UR-RTO: 1.2 RATIO — HIGH (ref 0–0.2)
PROTHROM AB SERPL-ACNC: 13.7 SEC — HIGH (ref 10.5–13.4)
PROTHROM AB SERPL-ACNC: 13.7 SEC — HIGH (ref 10.5–13.4)
PROTHROM AB SERPL-ACNC: 14 SEC — HIGH (ref 10.5–13.4)
PROTHROM AB SERPL-ACNC: 14.8 SEC — HIGH (ref 10.5–13.4)
PROTHROM AB SERPL-ACNC: 14.9 SEC — HIGH (ref 10.5–13.4)
PROTHROM AB SERPL-ACNC: 15.2 SEC — HIGH (ref 10.5–13.4)
PROTHROM AB SERPL-ACNC: 15.3 SEC — HIGH (ref 10.5–13.4)
PROTHROM AB SERPL-ACNC: 15.8 SEC — HIGH (ref 10.5–13.4)
PROTHROM AB SERPL-ACNC: 15.9 SEC — HIGH (ref 10.5–13.4)
PROTHROM AB SERPL-ACNC: 16 SEC — HIGH (ref 10.5–13.4)
PROTHROM AB SERPL-ACNC: 16 SEC — HIGH (ref 10.5–13.4)
PROTHROM AB SERPL-ACNC: 16.2 SEC — HIGH (ref 10.5–13.4)
PROTHROM AB SERPL-ACNC: 16.3 SEC — HIGH (ref 10.5–13.4)
PROTHROM AB SERPL-ACNC: 16.4 SEC — HIGH (ref 10.5–13.4)
PROTHROM AB SERPL-ACNC: 16.7 SEC — HIGH (ref 10.5–13.4)
PROTHROM AB SERPL-ACNC: 16.9 SEC — HIGH (ref 10.5–13.4)
PROTHROM AB SERPL-ACNC: 22.2 SEC — HIGH (ref 10.5–13.4)
PROTHROM AB SERPL-ACNC: 27.4 SEC — HIGH (ref 10.5–13.4)
PROTHROM AB SERPL-ACNC: 56.4 SEC — HIGH (ref 10.5–13.4)
PROTHROM AB SERPL-ACNC: 57.8 SEC — HIGH (ref 10.5–13.4)
PROTHROM AB SERPL-ACNC: 58.8 SEC — HIGH (ref 10.5–13.4)
PROTHROM AB SERPL-ACNC: 74.5 SEC — HIGH (ref 10.5–13.4)
PROTHROM AB SERPL-ACNC: 78.2 SEC — HIGH (ref 10.5–13.4)
RBC # BLD: 2.46 M/UL — LOW (ref 4.2–5.8)
RBC # BLD: 2.48 M/UL — LOW (ref 4.2–5.8)
RBC # BLD: 2.62 M/UL — LOW (ref 4.2–5.8)
RBC # BLD: 2.64 M/UL — LOW (ref 4.2–5.8)
RBC # BLD: 2.64 M/UL — LOW (ref 4.2–5.8)
RBC # BLD: 2.65 M/UL — LOW (ref 4.2–5.8)
RBC # BLD: 2.71 M/UL — LOW (ref 4.2–5.8)
RBC # BLD: 2.72 M/UL — LOW (ref 4.2–5.8)
RBC # BLD: 2.73 M/UL — LOW (ref 4.2–5.8)
RBC # BLD: 2.76 M/UL — LOW (ref 4.2–5.8)
RBC # BLD: 2.76 M/UL — LOW (ref 4.2–5.8)
RBC # BLD: 2.77 M/UL — LOW (ref 4.2–5.8)
RBC # BLD: 2.83 M/UL — LOW (ref 4.2–5.8)
RBC # BLD: 2.83 M/UL — LOW (ref 4.2–5.8)
RBC # BLD: 2.86 M/UL — LOW (ref 4.2–5.8)
RBC # BLD: 2.87 M/UL — LOW (ref 4.2–5.8)
RBC # BLD: 2.92 M/UL — LOW (ref 4.2–5.8)
RBC # BLD: 2.99 M/UL — LOW (ref 4.2–5.8)
RBC # BLD: 3.14 M/UL — LOW (ref 4.2–5.8)
RBC # BLD: 3.19 M/UL — LOW (ref 4.2–5.8)
RBC # BLD: 3.2 M/UL — LOW (ref 4.2–5.8)
RBC # BLD: 3.23 M/UL — LOW (ref 4.2–5.8)
RBC # BLD: 3.25 M/UL — LOW (ref 4.2–5.8)
RBC # BLD: 3.3 M/UL — LOW (ref 4.2–5.8)
RBC # BLD: 3.35 M/UL — LOW (ref 4.2–5.8)
RBC # BLD: 3.35 M/UL — LOW (ref 4.2–5.8)
RBC # BLD: 3.38 M/UL — LOW (ref 4.2–5.8)
RBC # BLD: 3.39 M/UL — LOW (ref 4.2–5.8)
RBC # BLD: 3.43 M/UL — LOW (ref 4.2–5.8)
RBC # BLD: 3.46 M/UL — LOW (ref 4.2–5.8)
RBC # BLD: 3.61 M/UL — LOW (ref 4.2–5.8)
RBC # BLD: 3.65 M/UL — LOW (ref 4.2–5.8)
RBC # BLD: 3.67 M/UL — LOW (ref 4.2–5.8)
RBC # BLD: 3.71 M/UL — LOW (ref 4.2–5.8)
RBC # BLD: 4.32 M/UL — SIGNIFICANT CHANGE UP (ref 4.2–5.8)
RBC # BLD: 4.44 M/UL — SIGNIFICANT CHANGE UP (ref 4.2–5.8)
RBC # BLD: 4.5 M/UL — SIGNIFICANT CHANGE UP (ref 4.2–5.8)
RBC # BLD: 4.53 M/UL — SIGNIFICANT CHANGE UP (ref 4.2–5.8)
RBC # BLD: 4.7 M/UL — SIGNIFICANT CHANGE UP (ref 4.2–5.8)
RBC # BLD: 4.7 M/UL — SIGNIFICANT CHANGE UP (ref 4.2–5.8)
RBC # BLD: 4.78 M/UL — SIGNIFICANT CHANGE UP (ref 4.2–5.8)
RBC # BLD: 4.82 M/UL — SIGNIFICANT CHANGE UP (ref 4.2–5.8)
RBC # BLD: 4.9 M/UL — SIGNIFICANT CHANGE UP (ref 4.2–5.8)
RBC # BLD: 4.9 M/UL — SIGNIFICANT CHANGE UP (ref 4.2–5.8)
RBC # BLD: 5.06 M/UL — SIGNIFICANT CHANGE UP (ref 4.2–5.8)
RBC # BLD: 5.41 M/UL — SIGNIFICANT CHANGE UP (ref 4.2–5.8)
RBC # BLD: 5.42 M/UL — SIGNIFICANT CHANGE UP (ref 4.2–5.8)
RBC # BLD: 5.77 M/UL — SIGNIFICANT CHANGE UP (ref 4.2–5.8)
RBC # FLD: 16.3 % — HIGH (ref 10.3–14.5)
RBC # FLD: 16.5 % — HIGH (ref 10.3–14.5)
RBC # FLD: 16.5 % — HIGH (ref 10.3–14.5)
RBC # FLD: 16.6 % — HIGH (ref 10.3–14.5)
RBC # FLD: 16.6 % — HIGH (ref 10.3–14.5)
RBC # FLD: 16.7 % — HIGH (ref 10.3–14.5)
RBC # FLD: 16.7 % — HIGH (ref 10.3–14.5)
RBC # FLD: 16.8 % — HIGH (ref 10.3–14.5)
RBC # FLD: 16.9 % — HIGH (ref 10.3–14.5)
RBC # FLD: 17 % — HIGH (ref 10.3–14.5)
RBC # FLD: 17.1 % — HIGH (ref 10.3–14.5)
RBC # FLD: 17.4 % — HIGH (ref 10.3–14.5)
RBC # FLD: 17.5 % — HIGH (ref 10.3–14.5)
RBC # FLD: 17.6 % — HIGH (ref 10.3–14.5)
RBC # FLD: 17.7 % — HIGH (ref 10.3–14.5)
RBC # FLD: 17.8 % — HIGH (ref 10.3–14.5)
RBC # FLD: 17.8 % — HIGH (ref 10.3–14.5)
RBC # FLD: 17.9 % — HIGH (ref 10.3–14.5)
RBC # FLD: 18 % — HIGH (ref 10.3–14.5)
RBC # FLD: 18 % — HIGH (ref 10.3–14.5)
RBC # FLD: 18.1 % — HIGH (ref 10.3–14.5)
RBC # FLD: 18.2 % — HIGH (ref 10.3–14.5)
RBC # FLD: 18.3 % — HIGH (ref 10.3–14.5)
RBC # FLD: 18.4 % — HIGH (ref 10.3–14.5)
RBC # FLD: 18.4 % — HIGH (ref 10.3–14.5)
RBC # FLD: 18.5 % — HIGH (ref 10.3–14.5)
RBC # FLD: 18.6 % — HIGH (ref 10.3–14.5)
RBC # FLD: 18.7 % — HIGH (ref 10.3–14.5)
RBC # FLD: 19.1 % — HIGH (ref 10.3–14.5)
RBC BLD AUTO: ABNORMAL
RBC BLD AUTO: ABNORMAL
RBC CASTS # UR COMP ASSIST: < 5 /HPF — SIGNIFICANT CHANGE UP
RBC CASTS # UR COMP ASSIST: ABNORMAL /HPF
RBC CASTS # UR COMP ASSIST: ABNORMAL /HPF
RH IG SCN BLD-IMP: POSITIVE — SIGNIFICANT CHANGE UP
SAO2 % BLDA: 100 % — HIGH (ref 94–98)
SAO2 % BLDA: 98.1 % — HIGH (ref 94–98)
SAO2 % BLDA: 98.4 % — HIGH (ref 94–98)
SAO2 % BLDA: 98.6 % — HIGH (ref 94–98)
SAO2 % BLDA: 98.8 % — HIGH (ref 94–98)
SAO2 % BLDA: 98.9 % — HIGH (ref 94–98)
SAO2 % BLDA: 99 % — HIGH (ref 94–98)
SAO2 % BLDA: 99.2 % — HIGH (ref 94–98)
SAO2 % BLDA: 99.3 % — HIGH (ref 94–98)
SAO2 % BLDA: 99.6 % — HIGH (ref 94–98)
SAO2 % BLDA: 99.7 % — HIGH (ref 94–98)
SAO2 % BLDV: 58.3 % — LOW (ref 67–88)
SAO2 % BLDV: 60.4 % — LOW (ref 67–88)
SAO2 % BLDV: 65.6 % — LOW (ref 67–88)
SAO2 % BLDV: 66.6 % — LOW (ref 67–88)
SAO2 % BLDV: 68.9 % — SIGNIFICANT CHANGE UP (ref 67–88)
SAO2 % BLDV: 70.4 % — SIGNIFICANT CHANGE UP (ref 67–88)
SAO2 % BLDV: 71.5 % — SIGNIFICANT CHANGE UP (ref 67–88)
SAO2 % BLDV: 72.3 % — SIGNIFICANT CHANGE UP (ref 67–88)
SAO2 % BLDV: 78.2 % — SIGNIFICANT CHANGE UP (ref 67–88)
SAO2 % BLDV: 85.4 % — SIGNIFICANT CHANGE UP (ref 67–88)
SAO2 % BLDV: 89 % — HIGH (ref 67–88)
SARS-COV-2 RNA SPEC QL NAA+PROBE: NEGATIVE — SIGNIFICANT CHANGE UP
SARS-COV-2 RNA SPEC QL NAA+PROBE: SIGNIFICANT CHANGE UP
SCHISTOCYTES BLD QL AUTO: SLIGHT — SIGNIFICANT CHANGE UP
SODIUM BLDA-SCNC: 128 MMOL/L — LOW (ref 136–145)
SODIUM BLDA-SCNC: 135 MMOL/L — LOW (ref 136–145)
SODIUM SERPL-SCNC: 128 MMOL/L — LOW (ref 135–145)
SODIUM SERPL-SCNC: 130 MMOL/L — LOW (ref 135–145)
SODIUM SERPL-SCNC: 131 MMOL/L — LOW (ref 135–145)
SODIUM SERPL-SCNC: 131 MMOL/L — LOW (ref 135–145)
SODIUM SERPL-SCNC: 132 MMOL/L — LOW (ref 135–145)
SODIUM SERPL-SCNC: 134 MMOL/L — LOW (ref 135–145)
SODIUM SERPL-SCNC: 135 MMOL/L — SIGNIFICANT CHANGE UP (ref 135–145)
SODIUM SERPL-SCNC: 136 MMOL/L — SIGNIFICANT CHANGE UP (ref 135–145)
SODIUM SERPL-SCNC: 137 MMOL/L — SIGNIFICANT CHANGE UP (ref 135–145)
SODIUM SERPL-SCNC: 138 MMOL/L — SIGNIFICANT CHANGE UP (ref 135–145)
SODIUM SERPL-SCNC: 139 MMOL/L — SIGNIFICANT CHANGE UP (ref 135–145)
SODIUM SERPL-SCNC: 140 MMOL/L — SIGNIFICANT CHANGE UP (ref 135–145)
SODIUM UR-SCNC: 20 MMOL/L — SIGNIFICANT CHANGE UP
SODIUM UR-SCNC: 20 MMOL/L — SIGNIFICANT CHANGE UP
SODIUM UR-SCNC: 40 MMOL/L — SIGNIFICANT CHANGE UP
SODIUM UR-SCNC: 73 MMOL/L — SIGNIFICANT CHANGE UP
SODIUM UR-SCNC: 80 MMOL/L — SIGNIFICANT CHANGE UP
SP GR SPEC: 1.02 — SIGNIFICANT CHANGE UP (ref 1–1.03)
SP GR SPEC: >=1.03 — SIGNIFICANT CHANGE UP (ref 1–1.03)
SP GR SPEC: >=1.03 — SIGNIFICANT CHANGE UP (ref 1–1.03)
SPECIMEN SOURCE: SIGNIFICANT CHANGE UP
SPECIMEN SOURCE: SIGNIFICANT CHANGE UP
SPHEROCYTES BLD QL SMEAR: SLIGHT — SIGNIFICANT CHANGE UP
SPHEROCYTES BLD QL SMEAR: SLIGHT — SIGNIFICANT CHANGE UP
SURGICAL PATHOLOGY STUDY: SIGNIFICANT CHANGE UP
T4 AB SER-ACNC: 7.72 UG/DL — SIGNIFICANT CHANGE UP (ref 4.5–11.7)
THC UR QL: NEGATIVE — SIGNIFICANT CHANGE UP
TROPONIN T SERPL-MCNC: 0.03 NG/ML — HIGH (ref 0–0.01)
TROPONIN T SERPL-MCNC: 0.03 NG/ML — HIGH (ref 0–0.01)
TROPONIN T SERPL-MCNC: 0.04 NG/ML — CRITICAL HIGH (ref 0–0.01)
TROPONIN T SERPL-MCNC: 0.12 NG/ML — CRITICAL HIGH (ref 0–0.01)
TROPONIN T SERPL-MCNC: 0.12 NG/ML — CRITICAL HIGH (ref 0–0.01)
TROPONIN T SERPL-MCNC: 0.16 NG/ML — CRITICAL HIGH (ref 0–0.01)
TSH SERPL-MCNC: 3.48 UIU/ML — SIGNIFICANT CHANGE UP (ref 0.27–4.2)
TSH SERPL-MCNC: 6.96 UIU/ML — HIGH (ref 0.27–4.2)
UROBILINOGEN FLD QL: 0.2 E.U./DL — SIGNIFICANT CHANGE UP
UROBILINOGEN FLD QL: 1 E.U./DL — SIGNIFICANT CHANGE UP
UROBILINOGEN FLD QL: 1 E.U./DL — SIGNIFICANT CHANGE UP
UUN UR-MCNC: 182 MG/DL — SIGNIFICANT CHANGE UP
UUN UR-MCNC: 380 MG/DL — SIGNIFICANT CHANGE UP
UUN UR-MCNC: 531 MG/DL — SIGNIFICANT CHANGE UP
VANCOMYCIN TROUGH SERPL-MCNC: 7.1 UG/ML — LOW (ref 10–20)
VANCOMYCIN TROUGH SERPL-MCNC: 8.2 UG/ML — LOW (ref 10–20)
WBC # BLD: 10.14 K/UL — SIGNIFICANT CHANGE UP (ref 3.8–10.5)
WBC # BLD: 10.59 K/UL — HIGH (ref 3.8–10.5)
WBC # BLD: 10.62 K/UL — HIGH (ref 3.8–10.5)
WBC # BLD: 11.24 K/UL — HIGH (ref 3.8–10.5)
WBC # BLD: 11.4 K/UL — HIGH (ref 3.8–10.5)
WBC # BLD: 11.41 K/UL — HIGH (ref 3.8–10.5)
WBC # BLD: 11.53 K/UL — HIGH (ref 3.8–10.5)
WBC # BLD: 11.55 K/UL — HIGH (ref 3.8–10.5)
WBC # BLD: 11.88 K/UL — HIGH (ref 3.8–10.5)
WBC # BLD: 12.01 K/UL — HIGH (ref 3.8–10.5)
WBC # BLD: 12.06 K/UL — HIGH (ref 3.8–10.5)
WBC # BLD: 12.11 K/UL — HIGH (ref 3.8–10.5)
WBC # BLD: 12.3 K/UL — HIGH (ref 3.8–10.5)
WBC # BLD: 12.55 K/UL — HIGH (ref 3.8–10.5)
WBC # BLD: 12.59 K/UL — HIGH (ref 3.8–10.5)
WBC # BLD: 12.93 K/UL — HIGH (ref 3.8–10.5)
WBC # BLD: 13 K/UL — HIGH (ref 3.8–10.5)
WBC # BLD: 13.23 K/UL — HIGH (ref 3.8–10.5)
WBC # BLD: 13.37 K/UL — HIGH (ref 3.8–10.5)
WBC # BLD: 13.74 K/UL — HIGH (ref 3.8–10.5)
WBC # BLD: 14.07 K/UL — HIGH (ref 3.8–10.5)
WBC # BLD: 14.15 K/UL — HIGH (ref 3.8–10.5)
WBC # BLD: 14.17 K/UL — HIGH (ref 3.8–10.5)
WBC # BLD: 14.71 K/UL — HIGH (ref 3.8–10.5)
WBC # BLD: 15.28 K/UL — HIGH (ref 3.8–10.5)
WBC # BLD: 15.35 K/UL — HIGH (ref 3.8–10.5)
WBC # BLD: 15.82 K/UL — HIGH (ref 3.8–10.5)
WBC # BLD: 16.74 K/UL — HIGH (ref 3.8–10.5)
WBC # BLD: 16.96 K/UL — HIGH (ref 3.8–10.5)
WBC # BLD: 18.06 K/UL — HIGH (ref 3.8–10.5)
WBC # BLD: 19.17 K/UL — HIGH (ref 3.8–10.5)
WBC # BLD: 20.3 K/UL — HIGH (ref 3.8–10.5)
WBC # BLD: 20.48 K/UL — HIGH (ref 3.8–10.5)
WBC # BLD: 5.03 K/UL — SIGNIFICANT CHANGE UP (ref 3.8–10.5)
WBC # BLD: 5.29 K/UL — SIGNIFICANT CHANGE UP (ref 3.8–10.5)
WBC # BLD: 7.28 K/UL — SIGNIFICANT CHANGE UP (ref 3.8–10.5)
WBC # BLD: 7.34 K/UL — SIGNIFICANT CHANGE UP (ref 3.8–10.5)
WBC # BLD: 7.39 K/UL — SIGNIFICANT CHANGE UP (ref 3.8–10.5)
WBC # BLD: 7.86 K/UL — SIGNIFICANT CHANGE UP (ref 3.8–10.5)
WBC # BLD: 7.94 K/UL — SIGNIFICANT CHANGE UP (ref 3.8–10.5)
WBC # BLD: 7.97 K/UL — SIGNIFICANT CHANGE UP (ref 3.8–10.5)
WBC # BLD: 8 K/UL — SIGNIFICANT CHANGE UP (ref 3.8–10.5)
WBC # BLD: 8.56 K/UL — SIGNIFICANT CHANGE UP (ref 3.8–10.5)
WBC # BLD: 8.85 K/UL — SIGNIFICANT CHANGE UP (ref 3.8–10.5)
WBC # BLD: 8.86 K/UL — SIGNIFICANT CHANGE UP (ref 3.8–10.5)
WBC # BLD: 9.02 K/UL — SIGNIFICANT CHANGE UP (ref 3.8–10.5)
WBC # BLD: 9.11 K/UL — SIGNIFICANT CHANGE UP (ref 3.8–10.5)
WBC # BLD: 9.97 K/UL — SIGNIFICANT CHANGE UP (ref 3.8–10.5)
WBC # FLD AUTO: 10.14 K/UL — SIGNIFICANT CHANGE UP (ref 3.8–10.5)
WBC # FLD AUTO: 10.59 K/UL — HIGH (ref 3.8–10.5)
WBC # FLD AUTO: 10.62 K/UL — HIGH (ref 3.8–10.5)
WBC # FLD AUTO: 11.24 K/UL — HIGH (ref 3.8–10.5)
WBC # FLD AUTO: 11.4 K/UL — HIGH (ref 3.8–10.5)
WBC # FLD AUTO: 11.41 K/UL — HIGH (ref 3.8–10.5)
WBC # FLD AUTO: 11.53 K/UL — HIGH (ref 3.8–10.5)
WBC # FLD AUTO: 11.55 K/UL — HIGH (ref 3.8–10.5)
WBC # FLD AUTO: 11.88 K/UL — HIGH (ref 3.8–10.5)
WBC # FLD AUTO: 12.01 K/UL — HIGH (ref 3.8–10.5)
WBC # FLD AUTO: 12.06 K/UL — HIGH (ref 3.8–10.5)
WBC # FLD AUTO: 12.11 K/UL — HIGH (ref 3.8–10.5)
WBC # FLD AUTO: 12.3 K/UL — HIGH (ref 3.8–10.5)
WBC # FLD AUTO: 12.55 K/UL — HIGH (ref 3.8–10.5)
WBC # FLD AUTO: 12.59 K/UL — HIGH (ref 3.8–10.5)
WBC # FLD AUTO: 12.93 K/UL — HIGH (ref 3.8–10.5)
WBC # FLD AUTO: 13 K/UL — HIGH (ref 3.8–10.5)
WBC # FLD AUTO: 13.23 K/UL — HIGH (ref 3.8–10.5)
WBC # FLD AUTO: 13.37 K/UL — HIGH (ref 3.8–10.5)
WBC # FLD AUTO: 13.74 K/UL — HIGH (ref 3.8–10.5)
WBC # FLD AUTO: 14.07 K/UL — HIGH (ref 3.8–10.5)
WBC # FLD AUTO: 14.15 K/UL — HIGH (ref 3.8–10.5)
WBC # FLD AUTO: 14.17 K/UL — HIGH (ref 3.8–10.5)
WBC # FLD AUTO: 14.71 K/UL — HIGH (ref 3.8–10.5)
WBC # FLD AUTO: 15.28 K/UL — HIGH (ref 3.8–10.5)
WBC # FLD AUTO: 15.35 K/UL — HIGH (ref 3.8–10.5)
WBC # FLD AUTO: 15.82 K/UL — HIGH (ref 3.8–10.5)
WBC # FLD AUTO: 16.74 K/UL — HIGH (ref 3.8–10.5)
WBC # FLD AUTO: 16.96 K/UL — HIGH (ref 3.8–10.5)
WBC # FLD AUTO: 18.06 K/UL — HIGH (ref 3.8–10.5)
WBC # FLD AUTO: 19.17 K/UL — HIGH (ref 3.8–10.5)
WBC # FLD AUTO: 20.3 K/UL — HIGH (ref 3.8–10.5)
WBC # FLD AUTO: 20.48 K/UL — HIGH (ref 3.8–10.5)
WBC # FLD AUTO: 5.03 K/UL — SIGNIFICANT CHANGE UP (ref 3.8–10.5)
WBC # FLD AUTO: 5.29 K/UL — SIGNIFICANT CHANGE UP (ref 3.8–10.5)
WBC # FLD AUTO: 7.28 K/UL — SIGNIFICANT CHANGE UP (ref 3.8–10.5)
WBC # FLD AUTO: 7.34 K/UL — SIGNIFICANT CHANGE UP (ref 3.8–10.5)
WBC # FLD AUTO: 7.39 K/UL — SIGNIFICANT CHANGE UP (ref 3.8–10.5)
WBC # FLD AUTO: 7.86 K/UL — SIGNIFICANT CHANGE UP (ref 3.8–10.5)
WBC # FLD AUTO: 7.94 K/UL — SIGNIFICANT CHANGE UP (ref 3.8–10.5)
WBC # FLD AUTO: 7.97 K/UL — SIGNIFICANT CHANGE UP (ref 3.8–10.5)
WBC # FLD AUTO: 8 K/UL — SIGNIFICANT CHANGE UP (ref 3.8–10.5)
WBC # FLD AUTO: 8.56 K/UL — SIGNIFICANT CHANGE UP (ref 3.8–10.5)
WBC # FLD AUTO: 8.85 K/UL — SIGNIFICANT CHANGE UP (ref 3.8–10.5)
WBC # FLD AUTO: 8.86 K/UL — SIGNIFICANT CHANGE UP (ref 3.8–10.5)
WBC # FLD AUTO: 9.02 K/UL — SIGNIFICANT CHANGE UP (ref 3.8–10.5)
WBC # FLD AUTO: 9.11 K/UL — SIGNIFICANT CHANGE UP (ref 3.8–10.5)
WBC # FLD AUTO: 9.97 K/UL — SIGNIFICANT CHANGE UP (ref 3.8–10.5)
WBC UR QL: < 5 /HPF — SIGNIFICANT CHANGE UP
WBC UR QL: < 5 /HPF — SIGNIFICANT CHANGE UP
WBC UR QL: ABNORMAL /HPF

## 2022-01-01 PROCEDURE — 71045 X-RAY EXAM CHEST 1 VIEW: CPT | Mod: 26

## 2022-01-01 PROCEDURE — 99233 SBSQ HOSP IP/OBS HIGH 50: CPT | Mod: GC

## 2022-01-01 PROCEDURE — 99291 CRITICAL CARE FIRST HOUR: CPT | Mod: 25

## 2022-01-01 PROCEDURE — 71045 X-RAY EXAM CHEST 1 VIEW: CPT | Mod: 26,77

## 2022-01-01 PROCEDURE — 99291 CRITICAL CARE FIRST HOUR: CPT

## 2022-01-01 PROCEDURE — 76770 US EXAM ABDO BACK WALL COMP: CPT | Mod: 26

## 2022-01-01 PROCEDURE — 93010 ELECTROCARDIOGRAM REPORT: CPT

## 2022-01-01 PROCEDURE — 99292 CRITICAL CARE ADDL 30 MIN: CPT | Mod: 25

## 2022-01-01 PROCEDURE — 99233 SBSQ HOSP IP/OBS HIGH 50: CPT

## 2022-01-01 PROCEDURE — 93306 TTE W/DOPPLER COMPLETE: CPT | Mod: 26

## 2022-01-01 PROCEDURE — 99223 1ST HOSP IP/OBS HIGH 75: CPT

## 2022-01-01 PROCEDURE — 88307 TISSUE EXAM BY PATHOLOGIST: CPT | Mod: 26

## 2022-01-01 PROCEDURE — 99232 SBSQ HOSP IP/OBS MODERATE 35: CPT

## 2022-01-01 PROCEDURE — 97606 NEG PRS WND THER DME>50 SQCM: CPT

## 2022-01-01 PROCEDURE — 34151 REMOVAL OF ARTERY CLOT: CPT | Mod: GC

## 2022-01-01 PROCEDURE — 99231 SBSQ HOSP IP/OBS SF/LOW 25: CPT

## 2022-01-01 PROCEDURE — 36000 PLACE NEEDLE IN VEIN: CPT

## 2022-01-01 PROCEDURE — 44120 REMOVAL OF SMALL INTESTINE: CPT | Mod: 78

## 2022-01-01 PROCEDURE — 93321 DOPPLER ECHO F-UP/LMTD STD: CPT | Mod: 26

## 2022-01-01 PROCEDURE — 44310 ILEOSTOMY/JEJUNOSTOMY: CPT | Mod: 58

## 2022-01-01 PROCEDURE — 44120 REMOVAL OF SMALL INTESTINE: CPT

## 2022-01-01 PROCEDURE — 36556 INSERT NON-TUNNEL CV CATH: CPT | Mod: RT

## 2022-01-01 PROCEDURE — 93308 TTE F-UP OR LMTD: CPT | Mod: 26

## 2022-01-01 PROCEDURE — 74174 CTA ABD&PLVS W/CONTRAST: CPT | Mod: 26

## 2022-01-01 PROCEDURE — 93312 ECHO TRANSESOPHAGEAL: CPT | Mod: 26

## 2022-01-01 PROCEDURE — 99497 ADVNCD CARE PLAN 30 MIN: CPT

## 2022-01-01 PROCEDURE — 99231 SBSQ HOSP IP/OBS SF/LOW 25: CPT | Mod: 25

## 2022-01-01 PROCEDURE — 92960 CARDIOVERSION ELECTRIC EXT: CPT

## 2022-01-01 PROCEDURE — 99222 1ST HOSP IP/OBS MODERATE 55: CPT | Mod: 57

## 2022-01-01 PROCEDURE — 88304 TISSUE EXAM BY PATHOLOGIST: CPT | Mod: 26

## 2022-01-01 PROCEDURE — 93925 LOWER EXTREMITY STUDY: CPT | Mod: 26

## 2022-01-01 DEVICE — CATH FOGARTY 3FR X 80CM: Type: IMPLANTABLE DEVICE | Status: FUNCTIONAL

## 2022-01-01 DEVICE — STAPLER ETHICON PROXIMATE 75MM WITH BLUE RELOAD: Type: IMPLANTABLE DEVICE | Status: FUNCTIONAL

## 2022-01-01 DEVICE — CLIP APPLIER ETHICON LIGACLIP 9 3/8" SMALL: Type: IMPLANTABLE DEVICE | Status: FUNCTIONAL

## 2022-01-01 DEVICE — IMPLANTABLE DEVICE: Type: IMPLANTABLE DEVICE | Status: FUNCTIONAL

## 2022-01-01 DEVICE — CLIP APPLIER ETHICON LIGACLIP 11.5" MEDIUM: Type: IMPLANTABLE DEVICE | Status: FUNCTIONAL

## 2022-01-01 DEVICE — STAPLER COVIDIEN TRI-STAPLE 45MM PURPLE RELOAD: Type: IMPLANTABLE DEVICE | Status: FUNCTIONAL

## 2022-01-01 RX ORDER — SODIUM CHLORIDE 9 MG/ML
1000 INJECTION, SOLUTION INTRAVENOUS ONCE
Refills: 0 | Status: COMPLETED | OUTPATIENT
Start: 2022-01-01 | End: 2022-01-01

## 2022-01-01 RX ORDER — PHENYLEPHRINE HYDROCHLORIDE 10 MG/ML
100 INJECTION INTRAVENOUS ONCE
Refills: 0 | Status: DISCONTINUED | OUTPATIENT
Start: 2022-01-01 | End: 2022-01-01

## 2022-01-01 RX ORDER — ACETAMINOPHEN 500 MG
1000 TABLET ORAL ONCE
Refills: 0 | Status: COMPLETED | OUTPATIENT
Start: 2022-01-01 | End: 2022-01-01

## 2022-01-01 RX ORDER — SODIUM CHLORIDE 9 MG/ML
500 INJECTION, SOLUTION INTRAVENOUS ONCE
Refills: 0 | Status: COMPLETED | OUTPATIENT
Start: 2022-01-01 | End: 2022-01-01

## 2022-01-01 RX ORDER — POTASSIUM PHOSPHATE, MONOBASIC POTASSIUM PHOSPHATE, DIBASIC 236; 224 MG/ML; MG/ML
30 INJECTION, SOLUTION INTRAVENOUS ONCE
Refills: 0 | Status: COMPLETED | OUTPATIENT
Start: 2022-01-01 | End: 2022-01-01

## 2022-01-01 RX ORDER — SODIUM CHLORIDE 9 MG/ML
500 INJECTION, SOLUTION INTRAVENOUS ONCE
Refills: 0 | Status: DISCONTINUED | OUTPATIENT
Start: 2022-01-01 | End: 2022-01-01

## 2022-01-01 RX ORDER — LOPERAMIDE HCL 2 MG
2 TABLET ORAL DAILY
Refills: 0 | Status: DISCONTINUED | OUTPATIENT
Start: 2022-01-01 | End: 2022-01-01

## 2022-01-01 RX ORDER — PIPERACILLIN AND TAZOBACTAM 4; .5 G/20ML; G/20ML
4.5 INJECTION, POWDER, LYOPHILIZED, FOR SOLUTION INTRAVENOUS ONCE
Refills: 0 | Status: COMPLETED | OUTPATIENT
Start: 2022-01-01 | End: 2022-01-01

## 2022-01-01 RX ORDER — POTASSIUM CHLORIDE 20 MEQ
20 PACKET (EA) ORAL
Refills: 0 | Status: DISCONTINUED | OUTPATIENT
Start: 2022-01-01 | End: 2022-01-01

## 2022-01-01 RX ORDER — DIGOXIN 250 MCG
250 TABLET ORAL ONCE
Refills: 0 | Status: COMPLETED | OUTPATIENT
Start: 2022-01-01 | End: 2022-01-01

## 2022-01-01 RX ORDER — AMIODARONE HYDROCHLORIDE 400 MG/1
0.5 TABLET ORAL
Qty: 900 | Refills: 0 | Status: DISCONTINUED | OUTPATIENT
Start: 2022-01-01 | End: 2022-01-01

## 2022-01-01 RX ORDER — DEXMEDETOMIDINE HYDROCHLORIDE IN 0.9% SODIUM CHLORIDE 4 UG/ML
0.2 INJECTION INTRAVENOUS
Qty: 400 | Refills: 0 | Status: DISCONTINUED | OUTPATIENT
Start: 2022-01-01 | End: 2022-01-01

## 2022-01-01 RX ORDER — SODIUM NITROPRUSSIDE 50 MG/2ML
0.5 INJECTION INTRAVENOUS
Qty: 100 | Refills: 0 | Status: DISCONTINUED | OUTPATIENT
Start: 2022-01-01 | End: 2022-01-01

## 2022-01-01 RX ORDER — PROPOFOL 10 MG/ML
9.99 INJECTION, EMULSION INTRAVENOUS
Qty: 1000 | Refills: 0 | Status: DISCONTINUED | OUTPATIENT
Start: 2022-01-01 | End: 2022-01-01

## 2022-01-01 RX ORDER — VASOPRESSIN 20 [USP'U]/ML
0.04 INJECTION INTRAVENOUS
Qty: 40 | Refills: 0 | Status: DISCONTINUED | OUTPATIENT
Start: 2022-01-01 | End: 2022-01-01

## 2022-01-01 RX ORDER — POTASSIUM CHLORIDE 20 MEQ
10 PACKET (EA) ORAL
Refills: 0 | Status: COMPLETED | OUTPATIENT
Start: 2022-01-01 | End: 2022-01-01

## 2022-01-01 RX ORDER — PHENYLEPHRINE HYDROCHLORIDE 10 MG/ML
300 INJECTION INTRAVENOUS ONCE
Refills: 0 | Status: COMPLETED | OUTPATIENT
Start: 2022-01-01 | End: 2022-01-01

## 2022-01-01 RX ORDER — SPIRONOLACTONE 25 MG/1
25 TABLET, FILM COATED ORAL DAILY
Refills: 0 | Status: DISCONTINUED | OUTPATIENT
Start: 2022-01-01 | End: 2022-01-01

## 2022-01-01 RX ORDER — DIGOXIN 250 MCG
250 TABLET ORAL EVERY 6 HOURS
Refills: 0 | Status: COMPLETED | OUTPATIENT
Start: 2022-01-01 | End: 2022-01-01

## 2022-01-01 RX ORDER — POTASSIUM PHOSPHATE, MONOBASIC POTASSIUM PHOSPHATE, DIBASIC 236; 224 MG/ML; MG/ML
15 INJECTION, SOLUTION INTRAVENOUS ONCE
Refills: 0 | Status: COMPLETED | OUTPATIENT
Start: 2022-01-01 | End: 2022-01-01

## 2022-01-01 RX ORDER — PANTOPRAZOLE SODIUM 20 MG/1
40 TABLET, DELAYED RELEASE ORAL
Refills: 0 | Status: DISCONTINUED | OUTPATIENT
Start: 2022-01-01 | End: 2023-01-01

## 2022-01-01 RX ORDER — HEPARIN SODIUM 5000 [USP'U]/ML
1100 INJECTION INTRAVENOUS; SUBCUTANEOUS
Qty: 25000 | Refills: 0 | Status: DISCONTINUED | OUTPATIENT
Start: 2022-01-01 | End: 2022-01-01

## 2022-01-01 RX ORDER — VANCOMYCIN HCL 1 G
1000 VIAL (EA) INTRAVENOUS EVERY 12 HOURS
Refills: 0 | Status: DISCONTINUED | OUTPATIENT
Start: 2022-01-01 | End: 2022-01-01

## 2022-01-01 RX ORDER — POTASSIUM CHLORIDE 20 MEQ
10 PACKET (EA) ORAL
Refills: 0 | Status: DISCONTINUED | OUTPATIENT
Start: 2022-01-01 | End: 2022-01-01

## 2022-01-01 RX ORDER — DIGOXIN 250 MCG
125 TABLET ORAL EVERY 24 HOURS
Refills: 0 | Status: DISCONTINUED | OUTPATIENT
Start: 2022-01-01 | End: 2022-01-01

## 2022-01-01 RX ORDER — SODIUM CHLORIDE 9 MG/ML
500 INJECTION INTRAMUSCULAR; INTRAVENOUS; SUBCUTANEOUS ONCE
Refills: 0 | Status: COMPLETED | OUTPATIENT
Start: 2022-01-01 | End: 2022-01-01

## 2022-01-01 RX ORDER — SODIUM CHLORIDE 9 MG/ML
500 INJECTION INTRAMUSCULAR; INTRAVENOUS; SUBCUTANEOUS ONCE
Refills: 0 | Status: DISCONTINUED | OUTPATIENT
Start: 2022-01-01 | End: 2022-01-01

## 2022-01-01 RX ORDER — POTASSIUM CHLORIDE 20 MEQ
10 PACKET (EA) ORAL ONCE
Refills: 0 | Status: COMPLETED | OUTPATIENT
Start: 2022-01-01 | End: 2022-01-01

## 2022-01-01 RX ORDER — AMIODARONE HYDROCHLORIDE 400 MG/1
400 TABLET ORAL EVERY 8 HOURS
Refills: 0 | Status: DISCONTINUED | OUTPATIENT
Start: 2022-01-01 | End: 2022-01-01

## 2022-01-01 RX ORDER — AMIODARONE HYDROCHLORIDE 400 MG/1
1 TABLET ORAL
Qty: 900 | Refills: 0 | Status: DISCONTINUED | OUTPATIENT
Start: 2022-01-01 | End: 2022-01-01

## 2022-01-01 RX ORDER — DIGOXIN 250 MCG
500 TABLET ORAL ONCE
Refills: 0 | Status: COMPLETED | OUTPATIENT
Start: 2022-01-01 | End: 2022-01-01

## 2022-01-01 RX ORDER — POTASSIUM CHLORIDE 20 MEQ
40 PACKET (EA) ORAL ONCE
Refills: 0 | Status: DISCONTINUED | OUTPATIENT
Start: 2022-01-01 | End: 2022-01-01

## 2022-01-01 RX ORDER — HEPARIN SODIUM 5000 [USP'U]/ML
800 INJECTION INTRAVENOUS; SUBCUTANEOUS
Qty: 25000 | Refills: 0 | Status: DISCONTINUED | OUTPATIENT
Start: 2022-01-01 | End: 2022-01-01

## 2022-01-01 RX ORDER — VANCOMYCIN HCL 1 G
1250 VIAL (EA) INTRAVENOUS EVERY 12 HOURS
Refills: 0 | Status: DISCONTINUED | OUTPATIENT
Start: 2022-01-01 | End: 2022-01-01

## 2022-01-01 RX ORDER — ALBUMIN HUMAN 25 %
250 VIAL (ML) INTRAVENOUS ONCE
Refills: 0 | Status: COMPLETED | OUTPATIENT
Start: 2022-01-01 | End: 2022-01-01

## 2022-01-01 RX ORDER — ONDANSETRON 8 MG/1
4 TABLET, FILM COATED ORAL ONCE
Refills: 0 | Status: COMPLETED | OUTPATIENT
Start: 2022-01-01 | End: 2022-01-01

## 2022-01-01 RX ORDER — INFLUENZA VIRUS VACCINE 15; 15; 15; 15 UG/.5ML; UG/.5ML; UG/.5ML; UG/.5ML
0.7 SUSPENSION INTRAMUSCULAR ONCE
Refills: 0 | Status: DISCONTINUED | OUTPATIENT
Start: 2022-01-01 | End: 2023-01-01

## 2022-01-01 RX ORDER — DEXMEDETOMIDINE HYDROCHLORIDE IN 0.9% SODIUM CHLORIDE 4 UG/ML
0.2 INJECTION INTRAVENOUS
Qty: 200 | Refills: 0 | Status: DISCONTINUED | OUTPATIENT
Start: 2022-01-01 | End: 2022-01-01

## 2022-01-01 RX ORDER — VALSARTAN 80 MG/1
20 TABLET ORAL DAILY
Refills: 0 | Status: DISCONTINUED | OUTPATIENT
Start: 2022-01-01 | End: 2022-01-01

## 2022-01-01 RX ORDER — MAGNESIUM SULFATE 500 MG/ML
1 VIAL (ML) INJECTION ONCE
Refills: 0 | Status: COMPLETED | OUTPATIENT
Start: 2022-01-01 | End: 2022-01-01

## 2022-01-01 RX ORDER — CHLORHEXIDINE GLUCONATE 213 G/1000ML
1 SOLUTION TOPICAL
Refills: 0 | Status: DISCONTINUED | OUTPATIENT
Start: 2022-01-01 | End: 2023-01-01

## 2022-01-01 RX ORDER — SODIUM,POTASSIUM PHOSPHATES 278-250MG
1 POWDER IN PACKET (EA) ORAL ONCE
Refills: 0 | Status: COMPLETED | OUTPATIENT
Start: 2022-01-01 | End: 2022-01-01

## 2022-01-01 RX ORDER — HEPARIN SODIUM 5000 [USP'U]/ML
1200 INJECTION INTRAVENOUS; SUBCUTANEOUS
Qty: 25000 | Refills: 0 | Status: DISCONTINUED | OUTPATIENT
Start: 2022-01-01 | End: 2022-01-01

## 2022-01-01 RX ORDER — SODIUM CHLORIDE 9 MG/ML
1000 INJECTION INTRAMUSCULAR; INTRAVENOUS; SUBCUTANEOUS ONCE
Refills: 0 | Status: COMPLETED | OUTPATIENT
Start: 2022-01-01 | End: 2022-01-01

## 2022-01-01 RX ORDER — PHYTONADIONE (VIT K1) 5 MG
10 TABLET ORAL ONCE
Refills: 0 | Status: COMPLETED | OUTPATIENT
Start: 2022-01-01 | End: 2022-01-01

## 2022-01-01 RX ORDER — DEXMEDETOMIDINE HYDROCHLORIDE IN 0.9% SODIUM CHLORIDE 4 UG/ML
0.1 INJECTION INTRAVENOUS
Qty: 400 | Refills: 0 | Status: DISCONTINUED | OUTPATIENT
Start: 2022-01-01 | End: 2022-01-01

## 2022-01-01 RX ORDER — ENOXAPARIN SODIUM 100 MG/ML
60 INJECTION SUBCUTANEOUS EVERY 12 HOURS
Refills: 0 | Status: DISCONTINUED | OUTPATIENT
Start: 2022-01-01 | End: 2023-01-01

## 2022-01-01 RX ORDER — SODIUM CHLORIDE 9 MG/ML
150 INJECTION INTRAMUSCULAR; INTRAVENOUS; SUBCUTANEOUS ONCE
Refills: 0 | Status: COMPLETED | OUTPATIENT
Start: 2022-01-01 | End: 2022-01-01

## 2022-01-01 RX ORDER — WARFARIN SODIUM 2.5 MG/1
5 TABLET ORAL ONCE
Refills: 0 | Status: DISCONTINUED | OUTPATIENT
Start: 2022-01-01 | End: 2022-01-01

## 2022-01-01 RX ORDER — MORPHINE SULFATE 50 MG/1
2 CAPSULE, EXTENDED RELEASE ORAL ONCE
Refills: 0 | Status: DISCONTINUED | OUTPATIENT
Start: 2022-01-01 | End: 2022-01-01

## 2022-01-01 RX ORDER — SODIUM CHLORIDE 9 MG/ML
1000 INJECTION INTRAMUSCULAR; INTRAVENOUS; SUBCUTANEOUS ONCE
Refills: 0 | Status: DISCONTINUED | OUTPATIENT
Start: 2022-01-01 | End: 2022-01-01

## 2022-01-01 RX ORDER — MAGNESIUM SULFATE 500 MG/ML
2 VIAL (ML) INJECTION ONCE
Refills: 0 | Status: COMPLETED | OUTPATIENT
Start: 2022-01-01 | End: 2022-01-01

## 2022-01-01 RX ORDER — MIDAZOLAM HYDROCHLORIDE 1 MG/ML
3 INJECTION, SOLUTION INTRAMUSCULAR; INTRAVENOUS ONCE
Refills: 0 | Status: DISCONTINUED | OUTPATIENT
Start: 2022-01-01 | End: 2022-01-01

## 2022-01-01 RX ORDER — HYDROMORPHONE HYDROCHLORIDE 2 MG/ML
0.5 INJECTION INTRAMUSCULAR; INTRAVENOUS; SUBCUTANEOUS
Refills: 0 | Status: DISCONTINUED | OUTPATIENT
Start: 2022-01-01 | End: 2022-01-01

## 2022-01-01 RX ORDER — METOPROLOL TARTRATE 50 MG
50 TABLET ORAL DAILY
Refills: 0 | Status: DISCONTINUED | OUTPATIENT
Start: 2022-01-01 | End: 2022-01-01

## 2022-01-01 RX ORDER — MORPHINE 10 MG/ML
6 SOLUTION ORAL
Refills: 0 | Status: DISCONTINUED | OUTPATIENT
Start: 2022-01-01 | End: 2023-01-01

## 2022-01-01 RX ORDER — NOREPINEPHRINE BITARTRATE/D5W 8 MG/250ML
0.05 PLASTIC BAG, INJECTION (ML) INTRAVENOUS
Qty: 8 | Refills: 0 | Status: DISCONTINUED | OUTPATIENT
Start: 2022-01-01 | End: 2022-01-01

## 2022-01-01 RX ORDER — FAMOTIDINE 10 MG/ML
20 INJECTION INTRAVENOUS ONCE
Refills: 0 | Status: COMPLETED | OUTPATIENT
Start: 2022-01-01 | End: 2022-01-01

## 2022-01-01 RX ORDER — METHADONE HYDROCHLORIDE 40 MG/1
10 TABLET ORAL DAILY
Refills: 0 | Status: DISCONTINUED | OUTPATIENT
Start: 2022-01-01 | End: 2022-01-01

## 2022-01-01 RX ORDER — HEPARIN SODIUM 5000 [USP'U]/ML
1500 INJECTION INTRAVENOUS; SUBCUTANEOUS
Qty: 25000 | Refills: 0 | Status: DISCONTINUED | OUTPATIENT
Start: 2022-01-01 | End: 2022-01-01

## 2022-01-01 RX ORDER — DEXTROSE 50 % IN WATER 50 %
25 SYRINGE (ML) INTRAVENOUS ONCE
Refills: 0 | Status: COMPLETED | OUTPATIENT
Start: 2022-01-01 | End: 2022-01-01

## 2022-01-01 RX ORDER — PROPOFOL 10 MG/ML
10 INJECTION, EMULSION INTRAVENOUS
Qty: 1000 | Refills: 0 | Status: DISCONTINUED | OUTPATIENT
Start: 2022-01-01 | End: 2022-01-01

## 2022-01-01 RX ORDER — POTASSIUM CHLORIDE 20 MEQ
20 PACKET (EA) ORAL
Refills: 0 | Status: COMPLETED | OUTPATIENT
Start: 2022-01-01 | End: 2022-01-01

## 2022-01-01 RX ORDER — FENTANYL CITRATE 50 UG/ML
0.5 INJECTION INTRAVENOUS
Qty: 2500 | Refills: 0 | Status: DISCONTINUED | OUTPATIENT
Start: 2022-01-01 | End: 2022-01-01

## 2022-01-01 RX ORDER — PIPERACILLIN AND TAZOBACTAM 4; .5 G/20ML; G/20ML
4.5 INJECTION, POWDER, LYOPHILIZED, FOR SOLUTION INTRAVENOUS EVERY 8 HOURS
Refills: 0 | Status: DISCONTINUED | OUTPATIENT
Start: 2022-01-01 | End: 2022-01-01

## 2022-01-01 RX ORDER — VANCOMYCIN HCL 1 G
750 VIAL (EA) INTRAVENOUS ONCE
Refills: 0 | Status: DISCONTINUED | OUTPATIENT
Start: 2022-01-01 | End: 2022-01-01

## 2022-01-01 RX ORDER — ACETAMINOPHEN 500 MG
1000 TABLET ORAL ONCE
Refills: 0 | Status: DISCONTINUED | OUTPATIENT
Start: 2022-01-01 | End: 2022-01-01

## 2022-01-01 RX ORDER — HEPARIN SODIUM 5000 [USP'U]/ML
750 INJECTION INTRAVENOUS; SUBCUTANEOUS
Qty: 25000 | Refills: 0 | Status: DISCONTINUED | OUTPATIENT
Start: 2022-01-01 | End: 2022-01-01

## 2022-01-01 RX ORDER — ASPIRIN/CALCIUM CARB/MAGNESIUM 324 MG
325 TABLET ORAL ONCE
Refills: 0 | Status: COMPLETED | OUTPATIENT
Start: 2022-01-01 | End: 2022-01-01

## 2022-01-01 RX ORDER — HEPARIN SODIUM 5000 [USP'U]/ML
1400 INJECTION INTRAVENOUS; SUBCUTANEOUS
Qty: 25000 | Refills: 0 | Status: DISCONTINUED | OUTPATIENT
Start: 2022-01-01 | End: 2022-01-01

## 2022-01-01 RX ORDER — ASCORBIC ACID 60 MG
500 TABLET,CHEWABLE ORAL DAILY
Refills: 0 | Status: DISCONTINUED | OUTPATIENT
Start: 2022-01-01 | End: 2023-01-01

## 2022-01-01 RX ORDER — SODIUM CHLORIDE 9 MG/ML
1000 INJECTION, SOLUTION INTRAVENOUS
Refills: 0 | Status: DISCONTINUED | OUTPATIENT
Start: 2022-01-01 | End: 2022-01-01

## 2022-01-01 RX ORDER — ALTEPLASE 100 MG
2 KIT INTRAVENOUS ONCE
Refills: 0 | Status: COMPLETED | OUTPATIENT
Start: 2022-01-01 | End: 2022-01-01

## 2022-01-01 RX ORDER — METOPROLOL TARTRATE 50 MG
25 TABLET ORAL DAILY
Refills: 0 | Status: DISCONTINUED | OUTPATIENT
Start: 2022-01-01 | End: 2022-01-01

## 2022-01-01 RX ORDER — PHENYLEPHRINE HYDROCHLORIDE 10 MG/ML
0.1 INJECTION INTRAVENOUS
Qty: 40 | Refills: 0 | Status: DISCONTINUED | OUTPATIENT
Start: 2022-01-01 | End: 2022-01-01

## 2022-01-01 RX ORDER — PROPOFOL 10 MG/ML
0.5 INJECTION, EMULSION INTRAVENOUS
Qty: 1000 | Refills: 0 | Status: DISCONTINUED | OUTPATIENT
Start: 2022-01-01 | End: 2022-01-01

## 2022-01-01 RX ORDER — HYDROMORPHONE HYDROCHLORIDE 2 MG/ML
1 INJECTION INTRAMUSCULAR; INTRAVENOUS; SUBCUTANEOUS
Refills: 0 | Status: DISCONTINUED | OUTPATIENT
Start: 2022-01-01 | End: 2022-01-01

## 2022-01-01 RX ORDER — HEPARIN SODIUM 5000 [USP'U]/ML
1000 INJECTION INTRAVENOUS; SUBCUTANEOUS
Qty: 25000 | Refills: 0 | Status: DISCONTINUED | OUTPATIENT
Start: 2022-01-01 | End: 2022-01-01

## 2022-01-01 RX ORDER — METOPROLOL TARTRATE 50 MG
12.5 TABLET ORAL EVERY 12 HOURS
Refills: 0 | Status: DISCONTINUED | OUTPATIENT
Start: 2022-01-01 | End: 2022-01-01

## 2022-01-01 RX ORDER — HEPARIN SODIUM 5000 [USP'U]/ML
1300 INJECTION INTRAVENOUS; SUBCUTANEOUS
Qty: 25000 | Refills: 0 | Status: DISCONTINUED | OUTPATIENT
Start: 2022-01-01 | End: 2022-01-01

## 2022-01-01 RX ORDER — PIPERACILLIN AND TAZOBACTAM 4; .5 G/20ML; G/20ML
4.5 INJECTION, POWDER, LYOPHILIZED, FOR SOLUTION INTRAVENOUS ONCE
Refills: 0 | Status: DISCONTINUED | OUTPATIENT
Start: 2022-01-01 | End: 2022-01-01

## 2022-01-01 RX ORDER — ALTEPLASE 100 MG
2 KIT INTRAVENOUS ONCE
Refills: 0 | Status: DISCONTINUED | OUTPATIENT
Start: 2022-01-01 | End: 2022-01-01

## 2022-01-01 RX ORDER — ASPIRIN/CALCIUM CARB/MAGNESIUM 324 MG
150 TABLET ORAL DAILY
Refills: 0 | Status: DISCONTINUED | OUTPATIENT
Start: 2022-01-01 | End: 2022-01-01

## 2022-01-01 RX ORDER — SODIUM CHLORIDE 9 MG/ML
1000 INJECTION, SOLUTION INTRAVENOUS
Refills: 0 | Status: DISCONTINUED | OUTPATIENT
Start: 2022-01-01 | End: 2023-01-01

## 2022-01-01 RX ORDER — PANTOPRAZOLE SODIUM 20 MG/1
40 TABLET, DELAYED RELEASE ORAL
Refills: 0 | Status: DISCONTINUED | OUTPATIENT
Start: 2022-01-01 | End: 2022-01-01

## 2022-01-01 RX ORDER — POTASSIUM PHOSPHATE, MONOBASIC POTASSIUM PHOSPHATE, DIBASIC 236; 224 MG/ML; MG/ML
15 INJECTION, SOLUTION INTRAVENOUS ONCE
Refills: 0 | Status: DISCONTINUED | OUTPATIENT
Start: 2022-01-01 | End: 2022-01-01

## 2022-01-01 RX ORDER — FENTANYL CITRATE 50 UG/ML
50 INJECTION INTRAVENOUS ONCE
Refills: 0 | Status: DISCONTINUED | OUTPATIENT
Start: 2022-01-01 | End: 2022-01-01

## 2022-01-01 RX ORDER — HEPARIN SODIUM 5000 [USP'U]/ML
5000 INJECTION INTRAVENOUS; SUBCUTANEOUS EVERY 8 HOURS
Refills: 0 | Status: DISCONTINUED | OUTPATIENT
Start: 2022-01-01 | End: 2022-01-01

## 2022-01-01 RX ORDER — OXYCODONE HYDROCHLORIDE 5 MG/1
5 TABLET ORAL EVERY 4 HOURS
Refills: 0 | Status: DISCONTINUED | OUTPATIENT
Start: 2022-01-01 | End: 2022-01-01

## 2022-01-01 RX ORDER — PIPERACILLIN AND TAZOBACTAM 4; .5 G/20ML; G/20ML
4.5 INJECTION, POWDER, LYOPHILIZED, FOR SOLUTION INTRAVENOUS EVERY 8 HOURS
Refills: 0 | Status: COMPLETED | OUTPATIENT
Start: 2022-01-01 | End: 2022-01-01

## 2022-01-01 RX ORDER — LOPERAMIDE HCL 2 MG
2 TABLET ORAL THREE TIMES A DAY
Refills: 0 | Status: DISCONTINUED | OUTPATIENT
Start: 2022-01-01 | End: 2022-01-01

## 2022-01-01 RX ORDER — HEPARIN SODIUM 5000 [USP'U]/ML
850 INJECTION INTRAVENOUS; SUBCUTANEOUS
Qty: 25000 | Refills: 0 | Status: DISCONTINUED | OUTPATIENT
Start: 2022-01-01 | End: 2022-01-01

## 2022-01-01 RX ORDER — INSULIN LISPRO 100/ML
VIAL (ML) SUBCUTANEOUS EVERY 6 HOURS
Refills: 0 | Status: DISCONTINUED | OUTPATIENT
Start: 2022-01-01 | End: 2023-01-01

## 2022-01-01 RX ORDER — AMIODARONE HYDROCHLORIDE 400 MG/1
150 TABLET ORAL ONCE
Refills: 0 | Status: COMPLETED | OUTPATIENT
Start: 2022-01-01 | End: 2022-01-01

## 2022-01-01 RX ORDER — VALSARTAN 80 MG/1
20 TABLET ORAL EVERY 12 HOURS
Refills: 0 | Status: DISCONTINUED | OUTPATIENT
Start: 2022-01-01 | End: 2022-01-01

## 2022-01-01 RX ORDER — METOPROLOL TARTRATE 50 MG
25 TABLET ORAL DAILY
Refills: 0 | Status: DISCONTINUED | OUTPATIENT
Start: 2022-01-01 | End: 2023-01-01

## 2022-01-01 RX ORDER — PROPOFOL 10 MG/ML
10 INJECTION, EMULSION INTRAVENOUS ONCE
Refills: 0 | Status: DISCONTINUED | OUTPATIENT
Start: 2022-01-01 | End: 2022-01-01

## 2022-01-01 RX ORDER — POTASSIUM CHLORIDE 20 MEQ
40 PACKET (EA) ORAL ONCE
Refills: 0 | Status: COMPLETED | OUTPATIENT
Start: 2022-01-01 | End: 2023-01-01

## 2022-01-01 RX ORDER — MIDAZOLAM HYDROCHLORIDE 1 MG/ML
1 INJECTION, SOLUTION INTRAMUSCULAR; INTRAVENOUS ONCE
Refills: 0 | Status: DISCONTINUED | OUTPATIENT
Start: 2022-01-01 | End: 2022-01-01

## 2022-01-01 RX ORDER — ASPIRIN/CALCIUM CARB/MAGNESIUM 324 MG
81 TABLET ORAL DAILY
Refills: 0 | Status: DISCONTINUED | OUTPATIENT
Start: 2022-01-01 | End: 2022-01-01

## 2022-01-01 RX ORDER — METHADONE HYDROCHLORIDE 40 MG/1
10 TABLET ORAL ONCE
Refills: 0 | Status: DISCONTINUED | OUTPATIENT
Start: 2022-01-01 | End: 2022-01-01

## 2022-01-01 RX ORDER — DEXTROSE 50 % IN WATER 50 %
25 SYRINGE (ML) INTRAVENOUS ONCE
Refills: 0 | Status: DISCONTINUED | OUTPATIENT
Start: 2022-01-01 | End: 2022-01-01

## 2022-01-01 RX ORDER — PROPOFOL 10 MG/ML
30 INJECTION, EMULSION INTRAVENOUS ONCE
Refills: 0 | Status: COMPLETED | OUTPATIENT
Start: 2022-01-01 | End: 2022-01-01

## 2022-01-01 RX ORDER — CHLORHEXIDINE GLUCONATE 213 G/1000ML
15 SOLUTION TOPICAL EVERY 12 HOURS
Refills: 0 | Status: DISCONTINUED | OUTPATIENT
Start: 2022-01-01 | End: 2022-01-01

## 2022-01-01 RX ORDER — ZINC SULFATE TAB 220 MG (50 MG ZINC EQUIVALENT) 220 (50 ZN) MG
220 TAB ORAL DAILY
Refills: 0 | Status: DISCONTINUED | OUTPATIENT
Start: 2022-01-01 | End: 2023-01-01

## 2022-01-01 RX ORDER — MIDAZOLAM HYDROCHLORIDE 1 MG/ML
8 INJECTION, SOLUTION INTRAMUSCULAR; INTRAVENOUS ONCE
Refills: 0 | Status: DISCONTINUED | OUTPATIENT
Start: 2022-01-01 | End: 2022-01-01

## 2022-01-01 RX ORDER — POTASSIUM CHLORIDE 20 MEQ
40 PACKET (EA) ORAL ONCE
Refills: 0 | Status: COMPLETED | OUTPATIENT
Start: 2022-01-01 | End: 2022-01-01

## 2022-01-01 RX ORDER — MIDAZOLAM HYDROCHLORIDE 1 MG/ML
0.02 INJECTION, SOLUTION INTRAMUSCULAR; INTRAVENOUS
Qty: 100 | Refills: 0 | Status: DISCONTINUED | OUTPATIENT
Start: 2022-01-01 | End: 2022-01-01

## 2022-01-01 RX ORDER — SODIUM CHLORIDE 9 MG/ML
250 INJECTION INTRAMUSCULAR; INTRAVENOUS; SUBCUTANEOUS ONCE
Refills: 0 | Status: COMPLETED | OUTPATIENT
Start: 2022-01-01 | End: 2022-01-01

## 2022-01-01 RX ORDER — ACETAMINOPHEN 500 MG
650 TABLET ORAL EVERY 6 HOURS
Refills: 0 | Status: DISCONTINUED | OUTPATIENT
Start: 2022-01-01 | End: 2023-01-01

## 2022-01-01 RX ORDER — DEXTROSE 50 % IN WATER 50 %
12.5 SYRINGE (ML) INTRAVENOUS ONCE
Refills: 0 | Status: DISCONTINUED | OUTPATIENT
Start: 2022-01-01 | End: 2022-01-01

## 2022-01-01 RX ORDER — PSYLLIUM SEED (WITH DEXTROSE)
1 POWDER (GRAM) ORAL EVERY 12 HOURS
Refills: 0 | Status: DISCONTINUED | OUTPATIENT
Start: 2022-01-01 | End: 2023-01-01

## 2022-01-01 RX ORDER — SODIUM CHLORIDE 9 MG/ML
1000 INJECTION INTRAMUSCULAR; INTRAVENOUS; SUBCUTANEOUS
Refills: 0 | Status: DISCONTINUED | OUTPATIENT
Start: 2022-01-01 | End: 2022-01-01

## 2022-01-01 RX ORDER — VASOPRESSIN 20 [USP'U]/ML
0.02 INJECTION INTRAVENOUS
Qty: 40 | Refills: 0 | Status: DISCONTINUED | OUTPATIENT
Start: 2022-01-01 | End: 2022-01-01

## 2022-01-01 RX ORDER — DRONABINOL 2.5 MG
2.5 CAPSULE ORAL EVERY 12 HOURS
Refills: 0 | Status: DISCONTINUED | OUTPATIENT
Start: 2022-01-01 | End: 2022-01-01

## 2022-01-01 RX ORDER — ATORVASTATIN CALCIUM 80 MG/1
40 TABLET, FILM COATED ORAL AT BEDTIME
Refills: 0 | Status: DISCONTINUED | OUTPATIENT
Start: 2022-01-01 | End: 2022-01-01

## 2022-01-01 RX ORDER — AMIODARONE HYDROCHLORIDE 400 MG/1
400 TABLET ORAL EVERY 12 HOURS
Refills: 0 | Status: COMPLETED | OUTPATIENT
Start: 2022-01-01 | End: 2023-01-01

## 2022-01-01 RX ORDER — LOPERAMIDE HCL 2 MG
4 TABLET ORAL THREE TIMES A DAY
Refills: 0 | Status: DISCONTINUED | OUTPATIENT
Start: 2022-01-01 | End: 2023-01-01

## 2022-01-01 RX ORDER — POTASSIUM CHLORIDE 20 MEQ
20 PACKET (EA) ORAL ONCE
Refills: 0 | Status: COMPLETED | OUTPATIENT
Start: 2022-01-01 | End: 2022-01-01

## 2022-01-01 RX ORDER — MIDAZOLAM HYDROCHLORIDE 1 MG/ML
2 INJECTION, SOLUTION INTRAMUSCULAR; INTRAVENOUS ONCE
Refills: 0 | Status: DISCONTINUED | OUTPATIENT
Start: 2022-01-01 | End: 2022-01-01

## 2022-01-01 RX ORDER — DIGOXIN 250 MCG
125 TABLET ORAL DAILY
Refills: 0 | Status: DISCONTINUED | OUTPATIENT
Start: 2022-01-01 | End: 2022-01-01

## 2022-01-01 RX ORDER — CHLORHEXIDINE GLUCONATE 213 G/1000ML
1 SOLUTION TOPICAL
Refills: 0 | Status: DISCONTINUED | OUTPATIENT
Start: 2022-01-01 | End: 2022-01-01

## 2022-01-01 RX ORDER — METHADONE HYDROCHLORIDE 40 MG/1
20 TABLET ORAL DAILY
Refills: 0 | Status: DISCONTINUED | OUTPATIENT
Start: 2022-01-01 | End: 2022-01-01

## 2022-01-01 RX ORDER — DEXTROSE 10 % IN WATER 10 %
1000 INTRAVENOUS SOLUTION INTRAVENOUS
Refills: 0 | Status: DISCONTINUED | OUTPATIENT
Start: 2022-01-01 | End: 2022-01-01

## 2022-01-01 RX ORDER — METOPROLOL TARTRATE 50 MG
5 TABLET ORAL ONCE
Refills: 0 | Status: COMPLETED | OUTPATIENT
Start: 2022-01-01 | End: 2022-01-01

## 2022-01-01 RX ORDER — FENTANYL CITRATE 50 UG/ML
100 INJECTION INTRAVENOUS ONCE
Refills: 0 | Status: DISCONTINUED | OUTPATIENT
Start: 2022-01-01 | End: 2022-01-01

## 2022-01-01 RX ORDER — HEPARIN SODIUM 5000 [USP'U]/ML
400 INJECTION INTRAVENOUS; SUBCUTANEOUS
Qty: 25000 | Refills: 0 | Status: DISCONTINUED | OUTPATIENT
Start: 2022-01-01 | End: 2022-01-01

## 2022-01-01 RX ORDER — LOPERAMIDE HCL 2 MG
2 TABLET ORAL EVERY 12 HOURS
Refills: 0 | Status: DISCONTINUED | OUTPATIENT
Start: 2022-01-01 | End: 2022-01-01

## 2022-01-01 RX ORDER — IPRATROPIUM/ALBUTEROL SULFATE 18-103MCG
3 AEROSOL WITH ADAPTER (GRAM) INHALATION EVERY 6 HOURS
Refills: 0 | Status: DISCONTINUED | OUTPATIENT
Start: 2022-01-01 | End: 2022-01-01

## 2022-01-01 RX ORDER — GLUCAGON INJECTION, SOLUTION 0.5 MG/.1ML
1 INJECTION, SOLUTION SUBCUTANEOUS ONCE
Refills: 0 | Status: DISCONTINUED | OUTPATIENT
Start: 2022-01-01 | End: 2023-01-01

## 2022-01-01 RX ORDER — DEXTROSE 50 % IN WATER 50 %
15 SYRINGE (ML) INTRAVENOUS ONCE
Refills: 0 | Status: DISCONTINUED | OUTPATIENT
Start: 2022-01-01 | End: 2023-01-01

## 2022-01-01 RX ORDER — DRONABINOL 2.5 MG
5 CAPSULE ORAL EVERY 12 HOURS
Refills: 0 | Status: DISCONTINUED | OUTPATIENT
Start: 2022-01-01 | End: 2023-01-01

## 2022-01-01 RX ADMIN — CHLORHEXIDINE GLUCONATE 15 MILLILITER(S): 213 SOLUTION TOPICAL at 17:16

## 2022-01-01 RX ADMIN — HEPARIN SODIUM 1100 UNIT(S)/HR: 5000 INJECTION INTRAVENOUS; SUBCUTANEOUS at 15:53

## 2022-01-01 RX ADMIN — PANTOPRAZOLE SODIUM 40 MILLIGRAM(S): 20 TABLET, DELAYED RELEASE ORAL at 19:30

## 2022-01-01 RX ADMIN — Medication 150 MILLIGRAM(S): at 11:25

## 2022-01-01 RX ADMIN — POTASSIUM PHOSPHATE, MONOBASIC POTASSIUM PHOSPHATE, DIBASIC 62.5 MILLIMOLE(S): 236; 224 INJECTION, SOLUTION INTRAVENOUS at 07:22

## 2022-01-01 RX ADMIN — CHLORHEXIDINE GLUCONATE 15 MILLILITER(S): 213 SOLUTION TOPICAL at 19:31

## 2022-01-01 RX ADMIN — SODIUM CHLORIDE 125 MILLILITER(S): 9 INJECTION, SOLUTION INTRAVENOUS at 11:00

## 2022-01-01 RX ADMIN — Medication 10 MILLIEQUIVALENT(S): at 07:52

## 2022-01-01 RX ADMIN — Medication 250 MILLIGRAM(S): at 15:00

## 2022-01-01 RX ADMIN — MIDAZOLAM HYDROCHLORIDE 2 MILLIGRAM(S): 1 INJECTION, SOLUTION INTRAMUSCULAR; INTRAVENOUS at 04:05

## 2022-01-01 RX ADMIN — CHLORHEXIDINE GLUCONATE 1 APPLICATION(S): 213 SOLUTION TOPICAL at 07:46

## 2022-01-01 RX ADMIN — PANTOPRAZOLE SODIUM 40 MILLIGRAM(S): 20 TABLET, DELAYED RELEASE ORAL at 17:57

## 2022-01-01 RX ADMIN — HYDROMORPHONE HYDROCHLORIDE 0.5 MILLIGRAM(S): 2 INJECTION INTRAMUSCULAR; INTRAVENOUS; SUBCUTANEOUS at 17:54

## 2022-01-01 RX ADMIN — HEPARIN SODIUM 5000 UNIT(S): 5000 INJECTION INTRAVENOUS; SUBCUTANEOUS at 22:02

## 2022-01-01 RX ADMIN — Medication 100 MILLIEQUIVALENT(S): at 11:22

## 2022-01-01 RX ADMIN — AMIODARONE HYDROCHLORIDE 16.7 MG/MIN: 400 TABLET ORAL at 20:35

## 2022-01-01 RX ADMIN — SODIUM CHLORIDE 100 MILLILITER(S): 9 INJECTION, SOLUTION INTRAVENOUS at 00:19

## 2022-01-01 RX ADMIN — Medication 20 MILLILITER(S): at 18:10

## 2022-01-01 RX ADMIN — SODIUM CHLORIDE 500 MILLILITER(S): 9 INJECTION INTRAMUSCULAR; INTRAVENOUS; SUBCUTANEOUS at 13:25

## 2022-01-01 RX ADMIN — SODIUM CHLORIDE 1000 MILLILITER(S): 9 INJECTION, SOLUTION INTRAVENOUS at 06:59

## 2022-01-01 RX ADMIN — CHLORHEXIDINE GLUCONATE 15 MILLILITER(S): 213 SOLUTION TOPICAL at 07:29

## 2022-01-01 RX ADMIN — PANTOPRAZOLE SODIUM 40 MILLIGRAM(S): 20 TABLET, DELAYED RELEASE ORAL at 07:54

## 2022-01-01 RX ADMIN — CHLORHEXIDINE GLUCONATE 1 APPLICATION(S): 213 SOLUTION TOPICAL at 07:07

## 2022-01-01 RX ADMIN — SODIUM CHLORIDE 150 MILLILITER(S): 9 INJECTION INTRAMUSCULAR; INTRAVENOUS; SUBCUTANEOUS at 19:55

## 2022-01-01 RX ADMIN — SODIUM CHLORIDE 500 MILLILITER(S): 9 INJECTION INTRAMUSCULAR; INTRAVENOUS; SUBCUTANEOUS at 13:20

## 2022-01-01 RX ADMIN — PIPERACILLIN AND TAZOBACTAM 25 GRAM(S): 4; .5 INJECTION, POWDER, LYOPHILIZED, FOR SOLUTION INTRAVENOUS at 19:24

## 2022-01-01 RX ADMIN — SODIUM CHLORIDE 100 MILLILITER(S): 9 INJECTION INTRAMUSCULAR; INTRAVENOUS; SUBCUTANEOUS at 06:32

## 2022-01-01 RX ADMIN — Medication 1 APPLICATION(S): at 11:04

## 2022-01-01 RX ADMIN — HEPARIN SODIUM 1400 UNIT(S)/HR: 5000 INJECTION INTRAVENOUS; SUBCUTANEOUS at 07:28

## 2022-01-01 RX ADMIN — Medication 3 MILLILITER(S): at 03:02

## 2022-01-01 RX ADMIN — Medication 6.04 MICROGRAM(S)/KG/MIN: at 20:58

## 2022-01-01 RX ADMIN — Medication 81 MILLIGRAM(S): at 12:21

## 2022-01-01 RX ADMIN — CHLORHEXIDINE GLUCONATE 15 MILLILITER(S): 213 SOLUTION TOPICAL at 18:04

## 2022-01-01 RX ADMIN — Medication 12.5 MILLIGRAM(S): at 06:05

## 2022-01-01 RX ADMIN — Medication 3 MILLILITER(S): at 17:58

## 2022-01-01 RX ADMIN — Medication 250 MICROGRAM(S): at 23:37

## 2022-01-01 RX ADMIN — Medication 3 MILLILITER(S): at 15:17

## 2022-01-01 RX ADMIN — SODIUM CHLORIDE 125 MILLILITER(S): 9 INJECTION, SOLUTION INTRAVENOUS at 17:40

## 2022-01-01 RX ADMIN — AMIODARONE HYDROCHLORIDE 400 MILLIGRAM(S): 400 TABLET ORAL at 11:40

## 2022-01-01 RX ADMIN — PIPERACILLIN AND TAZOBACTAM 25 GRAM(S): 4; .5 INJECTION, POWDER, LYOPHILIZED, FOR SOLUTION INTRAVENOUS at 21:01

## 2022-01-01 RX ADMIN — Medication 200 MILLIGRAM(S): at 23:58

## 2022-01-01 RX ADMIN — Medication 100 MILLIEQUIVALENT(S): at 10:18

## 2022-01-01 RX ADMIN — Medication 12.5 MILLIGRAM(S): at 18:09

## 2022-01-01 RX ADMIN — Medication 3 MILLILITER(S): at 04:40

## 2022-01-01 RX ADMIN — Medication 3 MILLILITER(S): at 05:27

## 2022-01-01 RX ADMIN — Medication 1 PACKET(S): at 06:20

## 2022-01-01 RX ADMIN — SODIUM NITROPRUSSIDE 4.83 MICROGRAM(S)/KG/MIN: 50 INJECTION INTRAVENOUS at 15:55

## 2022-01-01 RX ADMIN — CHLORHEXIDINE GLUCONATE 15 MILLILITER(S): 213 SOLUTION TOPICAL at 06:19

## 2022-01-01 RX ADMIN — SODIUM NITROPRUSSIDE 4.83 MICROGRAM(S)/KG/MIN: 50 INJECTION INTRAVENOUS at 13:57

## 2022-01-01 RX ADMIN — Medication 100 MILLIEQUIVALENT(S): at 06:44

## 2022-01-01 RX ADMIN — CHLORHEXIDINE GLUCONATE 15 MILLILITER(S): 213 SOLUTION TOPICAL at 06:11

## 2022-01-01 RX ADMIN — Medication 40 MILLIEQUIVALENT(S): at 07:05

## 2022-01-01 RX ADMIN — Medication 400 MILLIGRAM(S): at 06:03

## 2022-01-01 RX ADMIN — CHLORHEXIDINE GLUCONATE 1 APPLICATION(S): 213 SOLUTION TOPICAL at 06:27

## 2022-01-01 RX ADMIN — Medication 4 MILLIGRAM(S): at 06:45

## 2022-01-01 RX ADMIN — HEPARIN SODIUM 1000 UNIT(S)/HR: 5000 INJECTION INTRAVENOUS; SUBCUTANEOUS at 13:20

## 2022-01-01 RX ADMIN — Medication 3 MILLILITER(S): at 22:43

## 2022-01-01 RX ADMIN — Medication 100 MILLIEQUIVALENT(S): at 07:38

## 2022-01-01 RX ADMIN — PIPERACILLIN AND TAZOBACTAM 25 GRAM(S): 4; .5 INJECTION, POWDER, LYOPHILIZED, FOR SOLUTION INTRAVENOUS at 06:12

## 2022-01-01 RX ADMIN — SODIUM CHLORIDE 100 MILLILITER(S): 9 INJECTION, SOLUTION INTRAVENOUS at 06:06

## 2022-01-01 RX ADMIN — FENTANYL CITRATE 3.22 MICROGRAM(S)/KG/HR: 50 INJECTION INTRAVENOUS at 08:19

## 2022-01-01 RX ADMIN — Medication 1 APPLICATION(S): at 14:10

## 2022-01-01 RX ADMIN — PIPERACILLIN AND TAZOBACTAM 25 GRAM(S): 4; .5 INJECTION, POWDER, LYOPHILIZED, FOR SOLUTION INTRAVENOUS at 22:40

## 2022-01-01 RX ADMIN — Medication 650 MILLIGRAM(S): at 12:09

## 2022-01-01 RX ADMIN — Medication 62.5 MILLIMOLE(S): at 08:30

## 2022-01-01 RX ADMIN — Medication 100 MILLIEQUIVALENT(S): at 07:35

## 2022-01-01 RX ADMIN — Medication 2 MILLIGRAM(S): at 07:51

## 2022-01-01 RX ADMIN — SODIUM CHLORIDE 1000 MILLILITER(S): 9 INJECTION, SOLUTION INTRAVENOUS at 07:23

## 2022-01-01 RX ADMIN — Medication 1 TABLET(S): at 13:12

## 2022-01-01 RX ADMIN — Medication 150 MILLIGRAM(S): at 11:59

## 2022-01-01 RX ADMIN — CHLORHEXIDINE GLUCONATE 15 MILLILITER(S): 213 SOLUTION TOPICAL at 05:38

## 2022-01-01 RX ADMIN — SODIUM CHLORIDE 1000 MILLILITER(S): 9 INJECTION, SOLUTION INTRAVENOUS at 09:12

## 2022-01-01 RX ADMIN — PIPERACILLIN AND TAZOBACTAM 25 GRAM(S): 4; .5 INJECTION, POWDER, LYOPHILIZED, FOR SOLUTION INTRAVENOUS at 19:01

## 2022-01-01 RX ADMIN — SODIUM CHLORIDE 100 MILLILITER(S): 9 INJECTION INTRAMUSCULAR; INTRAVENOUS; SUBCUTANEOUS at 20:58

## 2022-01-01 RX ADMIN — AMIODARONE HYDROCHLORIDE 400 MILLIGRAM(S): 400 TABLET ORAL at 19:15

## 2022-01-01 RX ADMIN — CHLORHEXIDINE GLUCONATE 15 MILLILITER(S): 213 SOLUTION TOPICAL at 17:40

## 2022-01-01 RX ADMIN — ZINC SULFATE TAB 220 MG (50 MG ZINC EQUIVALENT) 220 MILLIGRAM(S): 220 (50 ZN) TAB at 13:14

## 2022-01-01 RX ADMIN — Medication 1 APPLICATION(S): at 08:00

## 2022-01-01 RX ADMIN — Medication 3 MILLILITER(S): at 21:01

## 2022-01-01 RX ADMIN — Medication 12.5 MILLIGRAM(S): at 17:22

## 2022-01-01 RX ADMIN — Medication 2 MILLIGRAM(S): at 18:41

## 2022-01-01 RX ADMIN — Medication 250 MICROGRAM(S): at 06:05

## 2022-01-01 RX ADMIN — ATORVASTATIN CALCIUM 40 MILLIGRAM(S): 80 TABLET, FILM COATED ORAL at 22:57

## 2022-01-01 RX ADMIN — FENTANYL CITRATE 50 MICROGRAM(S): 50 INJECTION INTRAVENOUS at 06:28

## 2022-01-01 RX ADMIN — HEPARIN SODIUM 5000 UNIT(S): 5000 INJECTION INTRAVENOUS; SUBCUTANEOUS at 06:02

## 2022-01-01 RX ADMIN — AMIODARONE HYDROCHLORIDE 400 MILLIGRAM(S): 400 TABLET ORAL at 10:55

## 2022-01-01 RX ADMIN — PANTOPRAZOLE SODIUM 40 MILLIGRAM(S): 20 TABLET, DELAYED RELEASE ORAL at 06:28

## 2022-01-01 RX ADMIN — PANTOPRAZOLE SODIUM 40 MILLIGRAM(S): 20 TABLET, DELAYED RELEASE ORAL at 18:50

## 2022-01-01 RX ADMIN — CHLORHEXIDINE GLUCONATE 15 MILLILITER(S): 213 SOLUTION TOPICAL at 18:30

## 2022-01-01 RX ADMIN — Medication 2.5 MILLIGRAM(S): at 06:02

## 2022-01-01 RX ADMIN — Medication 3 MILLILITER(S): at 05:03

## 2022-01-01 RX ADMIN — Medication 100 GRAM(S): at 07:48

## 2022-01-01 RX ADMIN — Medication 1 TABLET(S): at 11:40

## 2022-01-01 RX ADMIN — PANTOPRAZOLE SODIUM 40 MILLIGRAM(S): 20 TABLET, DELAYED RELEASE ORAL at 07:03

## 2022-01-01 RX ADMIN — Medication 1 TABLET(S): at 11:59

## 2022-01-01 RX ADMIN — CHLORHEXIDINE GLUCONATE 1 APPLICATION(S): 213 SOLUTION TOPICAL at 06:31

## 2022-01-01 RX ADMIN — PIPERACILLIN AND TAZOBACTAM 25 GRAM(S): 4; .5 INJECTION, POWDER, LYOPHILIZED, FOR SOLUTION INTRAVENOUS at 06:30

## 2022-01-01 RX ADMIN — PANTOPRAZOLE SODIUM 40 MILLIGRAM(S): 20 TABLET, DELAYED RELEASE ORAL at 18:10

## 2022-01-01 RX ADMIN — Medication 12.5 MILLIGRAM(S): at 06:03

## 2022-01-01 RX ADMIN — SODIUM CHLORIDE 100 MILLILITER(S): 9 INJECTION INTRAMUSCULAR; INTRAVENOUS; SUBCUTANEOUS at 13:30

## 2022-01-01 RX ADMIN — Medication 250 MILLIGRAM(S): at 13:51

## 2022-01-01 RX ADMIN — SODIUM CHLORIDE 60 MILLILITER(S): 9 INJECTION, SOLUTION INTRAVENOUS at 10:24

## 2022-01-01 RX ADMIN — POTASSIUM PHOSPHATE, MONOBASIC POTASSIUM PHOSPHATE, DIBASIC 62.5 MILLIMOLE(S): 236; 224 INJECTION, SOLUTION INTRAVENOUS at 09:34

## 2022-01-01 RX ADMIN — PANTOPRAZOLE SODIUM 40 MILLIGRAM(S): 20 TABLET, DELAYED RELEASE ORAL at 05:39

## 2022-01-01 RX ADMIN — PANTOPRAZOLE SODIUM 40 MILLIGRAM(S): 20 TABLET, DELAYED RELEASE ORAL at 17:47

## 2022-01-01 RX ADMIN — DEXMEDETOMIDINE HYDROCHLORIDE IN 0.9% SODIUM CHLORIDE 1.61 MICROGRAM(S)/KG/HR: 4 INJECTION INTRAVENOUS at 08:48

## 2022-01-01 RX ADMIN — SODIUM CHLORIDE 500 MILLILITER(S): 9 INJECTION, SOLUTION INTRAVENOUS at 08:26

## 2022-01-01 RX ADMIN — Medication 81 MILLIGRAM(S): at 11:58

## 2022-01-01 RX ADMIN — CHLORHEXIDINE GLUCONATE 1 APPLICATION(S): 213 SOLUTION TOPICAL at 05:46

## 2022-01-01 RX ADMIN — PIPERACILLIN AND TAZOBACTAM 25 GRAM(S): 4; .5 INJECTION, POWDER, LYOPHILIZED, FOR SOLUTION INTRAVENOUS at 22:20

## 2022-01-01 RX ADMIN — CHLORHEXIDINE GLUCONATE 15 MILLILITER(S): 213 SOLUTION TOPICAL at 06:03

## 2022-01-01 RX ADMIN — SODIUM CHLORIDE 100 MILLILITER(S): 9 INJECTION INTRAMUSCULAR; INTRAVENOUS; SUBCUTANEOUS at 06:20

## 2022-01-01 RX ADMIN — Medication 250 MICROGRAM(S): at 00:03

## 2022-01-01 RX ADMIN — PANTOPRAZOLE SODIUM 40 MILLIGRAM(S): 20 TABLET, DELAYED RELEASE ORAL at 17:40

## 2022-01-01 RX ADMIN — Medication 6.04 MICROGRAM(S)/KG/MIN: at 10:35

## 2022-01-01 RX ADMIN — Medication 650 MILLIGRAM(S): at 13:47

## 2022-01-01 RX ADMIN — Medication 102 MILLIGRAM(S): at 11:23

## 2022-01-01 RX ADMIN — METHADONE HYDROCHLORIDE 10 MILLIGRAM(S): 40 TABLET ORAL at 14:05

## 2022-01-01 RX ADMIN — ATORVASTATIN CALCIUM 40 MILLIGRAM(S): 80 TABLET, FILM COATED ORAL at 22:02

## 2022-01-01 RX ADMIN — PANTOPRAZOLE SODIUM 40 MILLIGRAM(S): 20 TABLET, DELAYED RELEASE ORAL at 06:19

## 2022-01-01 RX ADMIN — MIDAZOLAM HYDROCHLORIDE 1.29 MG/KG/HR: 1 INJECTION, SOLUTION INTRAMUSCULAR; INTRAVENOUS at 01:58

## 2022-01-01 RX ADMIN — SODIUM NITROPRUSSIDE 4.83 MICROGRAM(S)/KG/MIN: 50 INJECTION INTRAVENOUS at 05:41

## 2022-01-01 RX ADMIN — PIPERACILLIN AND TAZOBACTAM 25 GRAM(S): 4; .5 INJECTION, POWDER, LYOPHILIZED, FOR SOLUTION INTRAVENOUS at 06:28

## 2022-01-01 RX ADMIN — Medication 1000 MILLIGRAM(S): at 13:00

## 2022-01-01 RX ADMIN — PANTOPRAZOLE SODIUM 40 MILLIGRAM(S): 20 TABLET, DELAYED RELEASE ORAL at 17:34

## 2022-01-01 RX ADMIN — MIDAZOLAM HYDROCHLORIDE 1.29 MG/KG/HR: 1 INJECTION, SOLUTION INTRAMUSCULAR; INTRAVENOUS at 09:01

## 2022-01-01 RX ADMIN — CHLORHEXIDINE GLUCONATE 15 MILLILITER(S): 213 SOLUTION TOPICAL at 05:03

## 2022-01-01 RX ADMIN — Medication 200 MILLIGRAM(S): at 05:56

## 2022-01-01 RX ADMIN — Medication 1 PACKET(S): at 17:49

## 2022-01-01 RX ADMIN — SODIUM CHLORIDE 1000 MILLILITER(S): 9 INJECTION INTRAMUSCULAR; INTRAVENOUS; SUBCUTANEOUS at 22:40

## 2022-01-01 RX ADMIN — Medication 2.5 MILLIGRAM(S): at 18:05

## 2022-01-01 RX ADMIN — AMIODARONE HYDROCHLORIDE 400 MILLIGRAM(S): 400 TABLET ORAL at 22:40

## 2022-01-01 RX ADMIN — Medication 500 MILLIGRAM(S): at 13:14

## 2022-01-01 RX ADMIN — HYDROMORPHONE HYDROCHLORIDE 0.5 MILLIGRAM(S): 2 INJECTION INTRAMUSCULAR; INTRAVENOUS; SUBCUTANEOUS at 17:49

## 2022-01-01 RX ADMIN — HEPARIN SODIUM 8 UNIT(S)/HR: 5000 INJECTION INTRAVENOUS; SUBCUTANEOUS at 00:35

## 2022-01-01 RX ADMIN — PANTOPRAZOLE SODIUM 40 MILLIGRAM(S): 20 TABLET, DELAYED RELEASE ORAL at 06:44

## 2022-01-01 RX ADMIN — Medication 150 MILLIGRAM(S): at 12:23

## 2022-01-01 RX ADMIN — PANTOPRAZOLE SODIUM 40 MILLIGRAM(S): 20 TABLET, DELAYED RELEASE ORAL at 17:39

## 2022-01-01 RX ADMIN — POTASSIUM PHOSPHATE, MONOBASIC POTASSIUM PHOSPHATE, DIBASIC 62.5 MILLIMOLE(S): 236; 224 INJECTION, SOLUTION INTRAVENOUS at 07:04

## 2022-01-01 RX ADMIN — Medication 1 APPLICATION(S): at 11:22

## 2022-01-01 RX ADMIN — Medication 30 MILLILITER(S): at 18:36

## 2022-01-01 RX ADMIN — Medication 5 MILLIGRAM(S): at 17:57

## 2022-01-01 RX ADMIN — Medication 250 MILLIGRAM(S): at 14:26

## 2022-01-01 RX ADMIN — Medication 4 MILLIGRAM(S): at 22:40

## 2022-01-01 RX ADMIN — HYDROMORPHONE HYDROCHLORIDE 1 MILLIGRAM(S): 2 INJECTION INTRAMUSCULAR; INTRAVENOUS; SUBCUTANEOUS at 03:07

## 2022-01-01 RX ADMIN — POTASSIUM PHOSPHATE, MONOBASIC POTASSIUM PHOSPHATE, DIBASIC 62.5 MILLIMOLE(S): 236; 224 INJECTION, SOLUTION INTRAVENOUS at 09:14

## 2022-01-01 RX ADMIN — FENTANYL CITRATE 3.22 MICROGRAM(S)/KG/HR: 50 INJECTION INTRAVENOUS at 05:02

## 2022-01-01 RX ADMIN — PIPERACILLIN AND TAZOBACTAM 25 GRAM(S): 4; .5 INJECTION, POWDER, LYOPHILIZED, FOR SOLUTION INTRAVENOUS at 00:48

## 2022-01-01 RX ADMIN — HEPARIN SODIUM 1300 UNIT(S)/HR: 5000 INJECTION INTRAVENOUS; SUBCUTANEOUS at 20:13

## 2022-01-01 RX ADMIN — Medication 4 MILLIGRAM(S): at 05:36

## 2022-01-01 RX ADMIN — Medication 6.04 MICROGRAM(S)/KG/MIN: at 05:40

## 2022-01-01 RX ADMIN — Medication 400 MILLIGRAM(S): at 14:20

## 2022-01-01 RX ADMIN — FENTANYL CITRATE 50 MICROGRAM(S): 50 INJECTION INTRAVENOUS at 08:43

## 2022-01-01 RX ADMIN — Medication 81 MILLIGRAM(S): at 11:15

## 2022-01-01 RX ADMIN — Medication 204 MICROGRAM(S): at 06:21

## 2022-01-01 RX ADMIN — HEPARIN SODIUM 5000 UNIT(S): 5000 INJECTION INTRAVENOUS; SUBCUTANEOUS at 14:21

## 2022-01-01 RX ADMIN — Medication 125 MICROGRAM(S): at 11:18

## 2022-01-01 RX ADMIN — Medication 40 MILLIEQUIVALENT(S): at 11:25

## 2022-01-01 RX ADMIN — CHLORHEXIDINE GLUCONATE 15 MILLILITER(S): 213 SOLUTION TOPICAL at 06:31

## 2022-01-01 RX ADMIN — CHLORHEXIDINE GLUCONATE 1 APPLICATION(S): 213 SOLUTION TOPICAL at 06:51

## 2022-01-01 RX ADMIN — PIPERACILLIN AND TAZOBACTAM 25 GRAM(S): 4; .5 INJECTION, POWDER, LYOPHILIZED, FOR SOLUTION INTRAVENOUS at 13:28

## 2022-01-01 RX ADMIN — HEPARIN SODIUM 15 UNIT(S)/HR: 5000 INJECTION INTRAVENOUS; SUBCUTANEOUS at 13:21

## 2022-01-01 RX ADMIN — HEPARIN SODIUM 5000 UNIT(S): 5000 INJECTION INTRAVENOUS; SUBCUTANEOUS at 06:20

## 2022-01-01 RX ADMIN — Medication 500 MICROGRAM(S): at 17:19

## 2022-01-01 RX ADMIN — Medication 2.5 MILLIGRAM(S): at 06:20

## 2022-01-01 RX ADMIN — CHLORHEXIDINE GLUCONATE 1 APPLICATION(S): 213 SOLUTION TOPICAL at 06:12

## 2022-01-01 RX ADMIN — Medication 2 MILLIGRAM(S): at 11:16

## 2022-01-01 RX ADMIN — MIDAZOLAM HYDROCHLORIDE 1 MILLIGRAM(S): 1 INJECTION, SOLUTION INTRAMUSCULAR; INTRAVENOUS at 00:04

## 2022-01-01 RX ADMIN — Medication 1 PACKET(S): at 18:00

## 2022-01-01 RX ADMIN — PROPOFOL 30 MILLIGRAM(S): 10 INJECTION, EMULSION INTRAVENOUS at 10:24

## 2022-01-01 RX ADMIN — HYDROMORPHONE HYDROCHLORIDE 1 MILLIGRAM(S): 2 INJECTION INTRAMUSCULAR; INTRAVENOUS; SUBCUTANEOUS at 12:50

## 2022-01-01 RX ADMIN — Medication 50 MILLIEQUIVALENT(S): at 06:26

## 2022-01-01 RX ADMIN — Medication 125 MILLILITER(S): at 17:08

## 2022-01-01 RX ADMIN — HYDROMORPHONE HYDROCHLORIDE 0.5 MILLIGRAM(S): 2 INJECTION INTRAMUSCULAR; INTRAVENOUS; SUBCUTANEOUS at 14:38

## 2022-01-01 RX ADMIN — Medication 25 MILLIGRAM(S): at 05:22

## 2022-01-01 RX ADMIN — Medication 50 MILLIGRAM(S): at 06:19

## 2022-01-01 RX ADMIN — HEPARIN SODIUM 13 UNIT(S)/HR: 5000 INJECTION INTRAVENOUS; SUBCUTANEOUS at 18:45

## 2022-01-01 RX ADMIN — Medication 3 MILLILITER(S): at 22:18

## 2022-01-01 RX ADMIN — Medication 100 MILLIEQUIVALENT(S): at 09:10

## 2022-01-01 RX ADMIN — PIPERACILLIN AND TAZOBACTAM 25 GRAM(S): 4; .5 INJECTION, POWDER, LYOPHILIZED, FOR SOLUTION INTRAVENOUS at 14:17

## 2022-01-01 RX ADMIN — Medication 1000 MILLIGRAM(S): at 21:35

## 2022-01-01 RX ADMIN — Medication 100 MILLIEQUIVALENT(S): at 13:35

## 2022-01-01 RX ADMIN — SODIUM CHLORIDE 1000 MILLILITER(S): 9 INJECTION INTRAMUSCULAR; INTRAVENOUS; SUBCUTANEOUS at 08:47

## 2022-01-01 RX ADMIN — PANTOPRAZOLE SODIUM 40 MILLIGRAM(S): 20 TABLET, DELAYED RELEASE ORAL at 18:30

## 2022-01-01 RX ADMIN — Medication 5 MILLIGRAM(S): at 18:34

## 2022-01-01 RX ADMIN — Medication 1 PACKET(S): at 19:44

## 2022-01-01 RX ADMIN — VASOPRESSIN 6 UNIT(S)/MIN: 20 INJECTION INTRAVENOUS at 12:41

## 2022-01-01 RX ADMIN — Medication 2 MILLIGRAM(S): at 21:47

## 2022-01-01 RX ADMIN — PIPERACILLIN AND TAZOBACTAM 25 GRAM(S): 4; .5 INJECTION, POWDER, LYOPHILIZED, FOR SOLUTION INTRAVENOUS at 13:04

## 2022-01-01 RX ADMIN — Medication 1 APPLICATION(S): at 15:11

## 2022-01-01 RX ADMIN — CHLORHEXIDINE GLUCONATE 1 APPLICATION(S): 213 SOLUTION TOPICAL at 05:56

## 2022-01-01 RX ADMIN — Medication 100 MILLIEQUIVALENT(S): at 11:58

## 2022-01-01 RX ADMIN — SODIUM CHLORIDE 60 MILLILITER(S): 9 INJECTION, SOLUTION INTRAVENOUS at 14:06

## 2022-01-01 RX ADMIN — MIDAZOLAM HYDROCHLORIDE 3 MILLIGRAM(S): 1 INJECTION, SOLUTION INTRAMUSCULAR; INTRAVENOUS at 17:06

## 2022-01-01 RX ADMIN — Medication 100 MILLIEQUIVALENT(S): at 07:22

## 2022-01-01 RX ADMIN — Medication 12.5 MILLIGRAM(S): at 06:20

## 2022-01-01 RX ADMIN — Medication 4 MILLIGRAM(S): at 14:32

## 2022-01-01 RX ADMIN — Medication 400 MILLIGRAM(S): at 14:05

## 2022-01-01 RX ADMIN — SODIUM CHLORIDE 1000 MILLILITER(S): 9 INJECTION, SOLUTION INTRAVENOUS at 21:53

## 2022-01-01 RX ADMIN — PANTOPRAZOLE SODIUM 40 MILLIGRAM(S): 20 TABLET, DELAYED RELEASE ORAL at 17:17

## 2022-01-01 RX ADMIN — POTASSIUM PHOSPHATE, MONOBASIC POTASSIUM PHOSPHATE, DIBASIC 62.5 MILLIMOLE(S): 236; 224 INJECTION, SOLUTION INTRAVENOUS at 08:06

## 2022-01-01 RX ADMIN — Medication 2.5 MILLIGRAM(S): at 06:05

## 2022-01-01 RX ADMIN — VASOPRESSIN 6 UNIT(S)/MIN: 20 INJECTION INTRAVENOUS at 15:51

## 2022-01-01 RX ADMIN — Medication 5 MILLIGRAM(S): at 14:06

## 2022-01-01 RX ADMIN — PANTOPRAZOLE SODIUM 40 MILLIGRAM(S): 20 TABLET, DELAYED RELEASE ORAL at 05:04

## 2022-01-01 RX ADMIN — Medication 250 MILLIGRAM(S): at 02:32

## 2022-01-01 RX ADMIN — SODIUM CHLORIDE 1000 MILLILITER(S): 9 INJECTION, SOLUTION INTRAVENOUS at 15:29

## 2022-01-01 RX ADMIN — Medication 1 APPLICATION(S): at 14:09

## 2022-01-01 RX ADMIN — Medication 100 GRAM(S): at 07:47

## 2022-01-01 RX ADMIN — Medication 25 GRAM(S): at 07:43

## 2022-01-01 RX ADMIN — Medication 2 MILLIGRAM(S): at 09:46

## 2022-01-01 RX ADMIN — Medication 1 APPLICATION(S): at 11:06

## 2022-01-01 RX ADMIN — Medication 2 MILLIGRAM(S): at 11:58

## 2022-01-01 RX ADMIN — PANTOPRAZOLE SODIUM 40 MILLIGRAM(S): 20 TABLET, DELAYED RELEASE ORAL at 06:02

## 2022-01-01 RX ADMIN — CHLORHEXIDINE GLUCONATE 15 MILLILITER(S): 213 SOLUTION TOPICAL at 05:21

## 2022-01-01 RX ADMIN — AMIODARONE HYDROCHLORIDE 16.7 MG/MIN: 400 TABLET ORAL at 18:35

## 2022-01-01 RX ADMIN — CHLORHEXIDINE GLUCONATE 1 APPLICATION(S): 213 SOLUTION TOPICAL at 07:14

## 2022-01-01 RX ADMIN — SODIUM NITROPRUSSIDE 4.83 MICROGRAM(S)/KG/MIN: 50 INJECTION INTRAVENOUS at 16:05

## 2022-01-01 RX ADMIN — CHLORHEXIDINE GLUCONATE 15 MILLILITER(S): 213 SOLUTION TOPICAL at 17:55

## 2022-01-01 RX ADMIN — PANTOPRAZOLE SODIUM 40 MILLIGRAM(S): 20 TABLET, DELAYED RELEASE ORAL at 17:46

## 2022-01-01 RX ADMIN — PIPERACILLIN AND TAZOBACTAM 25 GRAM(S): 4; .5 INJECTION, POWDER, LYOPHILIZED, FOR SOLUTION INTRAVENOUS at 14:06

## 2022-01-01 RX ADMIN — Medication 3 MILLILITER(S): at 09:43

## 2022-01-01 RX ADMIN — Medication 3 MILLILITER(S): at 10:47

## 2022-01-01 RX ADMIN — Medication 1000 MILLIGRAM(S): at 07:45

## 2022-01-01 RX ADMIN — PANTOPRAZOLE SODIUM 40 MILLIGRAM(S): 20 TABLET, DELAYED RELEASE ORAL at 06:07

## 2022-01-01 RX ADMIN — MIDAZOLAM HYDROCHLORIDE 1.29 MG/KG/HR: 1 INJECTION, SOLUTION INTRAMUSCULAR; INTRAVENOUS at 13:05

## 2022-01-01 RX ADMIN — Medication 150 MILLIGRAM(S): at 12:34

## 2022-01-01 RX ADMIN — Medication 2 MILLIGRAM(S): at 11:30

## 2022-01-01 RX ADMIN — Medication 3 MILLILITER(S): at 11:33

## 2022-01-01 RX ADMIN — Medication 100 MILLIEQUIVALENT(S): at 10:41

## 2022-01-01 RX ADMIN — HEPARIN SODIUM 5000 UNIT(S): 5000 INJECTION INTRAVENOUS; SUBCUTANEOUS at 21:48

## 2022-01-01 RX ADMIN — PIPERACILLIN AND TAZOBACTAM 25 GRAM(S): 4; .5 INJECTION, POWDER, LYOPHILIZED, FOR SOLUTION INTRAVENOUS at 04:32

## 2022-01-01 RX ADMIN — Medication 1 APPLICATION(S): at 17:49

## 2022-01-01 RX ADMIN — CHLORHEXIDINE GLUCONATE 1 APPLICATION(S): 213 SOLUTION TOPICAL at 06:04

## 2022-01-01 RX ADMIN — HEPARIN SODIUM 12 UNIT(S)/HR: 5000 INJECTION INTRAVENOUS; SUBCUTANEOUS at 18:07

## 2022-01-01 RX ADMIN — Medication 2: at 23:56

## 2022-01-01 RX ADMIN — Medication 81 MILLIGRAM(S): at 19:25

## 2022-01-01 RX ADMIN — DEXMEDETOMIDINE HYDROCHLORIDE IN 0.9% SODIUM CHLORIDE 1.61 MICROGRAM(S)/KG/HR: 4 INJECTION INTRAVENOUS at 06:15

## 2022-01-01 RX ADMIN — SODIUM CHLORIDE 100 MILLILITER(S): 9 INJECTION, SOLUTION INTRAVENOUS at 13:02

## 2022-01-01 RX ADMIN — Medication 100 MILLIEQUIVALENT(S): at 09:15

## 2022-01-01 RX ADMIN — PIPERACILLIN AND TAZOBACTAM 25 GRAM(S): 4; .5 INJECTION, POWDER, LYOPHILIZED, FOR SOLUTION INTRAVENOUS at 06:04

## 2022-01-01 RX ADMIN — Medication 50 MILLILITER(S): at 11:41

## 2022-01-01 RX ADMIN — Medication 3 MILLILITER(S): at 16:01

## 2022-01-01 RX ADMIN — Medication 2.5 MILLIGRAM(S): at 17:39

## 2022-01-01 RX ADMIN — CHLORHEXIDINE GLUCONATE 1 APPLICATION(S): 213 SOLUTION TOPICAL at 06:02

## 2022-01-01 RX ADMIN — Medication 150 MILLIGRAM(S): at 16:22

## 2022-01-01 RX ADMIN — Medication 12.5 MILLIGRAM(S): at 17:30

## 2022-01-01 RX ADMIN — PIPERACILLIN AND TAZOBACTAM 25 GRAM(S): 4; .5 INJECTION, POWDER, LYOPHILIZED, FOR SOLUTION INTRAVENOUS at 06:03

## 2022-01-01 RX ADMIN — Medication 400 MILLIGRAM(S): at 19:34

## 2022-01-01 RX ADMIN — MORPHINE 6 MILLIGRAM(S): 10 SOLUTION ORAL at 12:22

## 2022-01-01 RX ADMIN — FENTANYL CITRATE 3.22 MICROGRAM(S)/KG/HR: 50 INJECTION INTRAVENOUS at 01:58

## 2022-01-01 RX ADMIN — POTASSIUM PHOSPHATE, MONOBASIC POTASSIUM PHOSPHATE, DIBASIC 62.5 MILLIMOLE(S): 236; 224 INJECTION, SOLUTION INTRAVENOUS at 07:14

## 2022-01-01 RX ADMIN — SODIUM CHLORIDE 100 MILLILITER(S): 9 INJECTION, SOLUTION INTRAVENOUS at 12:21

## 2022-01-01 RX ADMIN — Medication 125 MICROGRAM(S): at 09:12

## 2022-01-01 RX ADMIN — PANTOPRAZOLE SODIUM 40 MILLIGRAM(S): 20 TABLET, DELAYED RELEASE ORAL at 17:37

## 2022-01-01 RX ADMIN — Medication 12.5 MILLIGRAM(S): at 18:05

## 2022-01-01 RX ADMIN — FENTANYL CITRATE 50 MICROGRAM(S): 50 INJECTION INTRAVENOUS at 17:45

## 2022-01-01 RX ADMIN — CHLORHEXIDINE GLUCONATE 1 APPLICATION(S): 213 SOLUTION TOPICAL at 06:14

## 2022-01-01 RX ADMIN — ENOXAPARIN SODIUM 60 MILLIGRAM(S): 100 INJECTION SUBCUTANEOUS at 18:00

## 2022-01-01 RX ADMIN — Medication 12.5 MILLIGRAM(S): at 17:00

## 2022-01-01 RX ADMIN — CHLORHEXIDINE GLUCONATE 1 APPLICATION(S): 213 SOLUTION TOPICAL at 05:22

## 2022-01-01 RX ADMIN — CHLORHEXIDINE GLUCONATE 1 APPLICATION(S): 213 SOLUTION TOPICAL at 08:19

## 2022-01-01 RX ADMIN — AMIODARONE HYDROCHLORIDE 400 MILLIGRAM(S): 400 TABLET ORAL at 11:59

## 2022-01-01 RX ADMIN — Medication 1000 MILLIGRAM(S): at 15:33

## 2022-01-01 RX ADMIN — SODIUM CHLORIDE 1000 MILLILITER(S): 9 INJECTION INTRAMUSCULAR; INTRAVENOUS; SUBCUTANEOUS at 03:01

## 2022-01-01 RX ADMIN — SODIUM CHLORIDE 500 MILLILITER(S): 9 INJECTION, SOLUTION INTRAVENOUS at 16:10

## 2022-01-01 RX ADMIN — CHLORHEXIDINE GLUCONATE 1 APPLICATION(S): 213 SOLUTION TOPICAL at 06:03

## 2022-01-01 RX ADMIN — AMIODARONE HYDROCHLORIDE 600 MILLIGRAM(S): 400 TABLET ORAL at 13:10

## 2022-01-01 RX ADMIN — CHLORHEXIDINE GLUCONATE 1 APPLICATION(S): 213 SOLUTION TOPICAL at 19:31

## 2022-01-01 RX ADMIN — Medication 3 MILLILITER(S): at 09:42

## 2022-01-01 RX ADMIN — Medication 1000 MILLIGRAM(S): at 14:34

## 2022-01-01 RX ADMIN — CHLORHEXIDINE GLUCONATE 1 APPLICATION(S): 213 SOLUTION TOPICAL at 05:04

## 2022-01-01 RX ADMIN — Medication 2 MILLIGRAM(S): at 12:01

## 2022-01-01 RX ADMIN — SPIRONOLACTONE 25 MILLIGRAM(S): 25 TABLET, FILM COATED ORAL at 06:19

## 2022-01-01 RX ADMIN — Medication 400 MILLIGRAM(S): at 22:15

## 2022-01-01 RX ADMIN — Medication 400 MILLIGRAM(S): at 07:15

## 2022-01-01 RX ADMIN — CHLORHEXIDINE GLUCONATE 15 MILLILITER(S): 213 SOLUTION TOPICAL at 05:40

## 2022-01-01 RX ADMIN — SODIUM CHLORIDE 1500 MILLILITER(S): 9 INJECTION INTRAMUSCULAR; INTRAVENOUS; SUBCUTANEOUS at 04:28

## 2022-01-01 RX ADMIN — POTASSIUM PHOSPHATE, MONOBASIC POTASSIUM PHOSPHATE, DIBASIC 83.33 MILLIMOLE(S): 236; 224 INJECTION, SOLUTION INTRAVENOUS at 11:23

## 2022-01-01 RX ADMIN — Medication 85 MILLIMOLE(S): at 11:51

## 2022-01-01 RX ADMIN — Medication 3 MILLILITER(S): at 21:04

## 2022-01-01 RX ADMIN — Medication 2 MILLIGRAM(S): at 19:35

## 2022-01-01 RX ADMIN — Medication 3 MILLILITER(S): at 23:23

## 2022-01-01 RX ADMIN — PHENYLEPHRINE HYDROCHLORIDE 300 MICROGRAM(S): 10 INJECTION INTRAVENOUS at 10:24

## 2022-01-01 RX ADMIN — Medication 3 MILLILITER(S): at 18:07

## 2022-01-01 RX ADMIN — Medication 25 MILLIGRAM(S): at 06:44

## 2022-01-01 RX ADMIN — HYDROMORPHONE HYDROCHLORIDE 0.5 MILLIGRAM(S): 2 INJECTION INTRAMUSCULAR; INTRAVENOUS; SUBCUTANEOUS at 14:55

## 2022-01-01 RX ADMIN — PIPERACILLIN AND TAZOBACTAM 25 GRAM(S): 4; .5 INJECTION, POWDER, LYOPHILIZED, FOR SOLUTION INTRAVENOUS at 13:59

## 2022-01-01 RX ADMIN — Medication 400 MILLIGRAM(S): at 04:43

## 2022-01-01 RX ADMIN — PANTOPRAZOLE SODIUM 40 MILLIGRAM(S): 20 TABLET, DELAYED RELEASE ORAL at 06:03

## 2022-01-01 RX ADMIN — PIPERACILLIN AND TAZOBACTAM 25 GRAM(S): 4; .5 INJECTION, POWDER, LYOPHILIZED, FOR SOLUTION INTRAVENOUS at 14:37

## 2022-01-01 RX ADMIN — Medication 100 MILLIEQUIVALENT(S): at 14:48

## 2022-01-01 RX ADMIN — Medication 1 APPLICATION(S): at 11:52

## 2022-01-01 RX ADMIN — PANTOPRAZOLE SODIUM 40 MILLIGRAM(S): 20 TABLET, DELAYED RELEASE ORAL at 17:14

## 2022-01-01 RX ADMIN — MIDAZOLAM HYDROCHLORIDE 1.29 MG/KG/HR: 1 INJECTION, SOLUTION INTRAMUSCULAR; INTRAVENOUS at 06:20

## 2022-01-01 RX ADMIN — Medication 4 MILLIGRAM(S): at 14:20

## 2022-01-01 RX ADMIN — Medication 166.67 MILLIGRAM(S): at 05:41

## 2022-01-01 RX ADMIN — Medication 1000 MILLIGRAM(S): at 06:33

## 2022-01-01 RX ADMIN — Medication 400 MILLIGRAM(S): at 12:37

## 2022-01-01 RX ADMIN — PIPERACILLIN AND TAZOBACTAM 25 GRAM(S): 4; .5 INJECTION, POWDER, LYOPHILIZED, FOR SOLUTION INTRAVENOUS at 21:20

## 2022-01-01 RX ADMIN — PANTOPRAZOLE SODIUM 40 MILLIGRAM(S): 20 TABLET, DELAYED RELEASE ORAL at 01:43

## 2022-01-01 RX ADMIN — POTASSIUM PHOSPHATE, MONOBASIC POTASSIUM PHOSPHATE, DIBASIC 62.5 MILLIMOLE(S): 236; 224 INJECTION, SOLUTION INTRAVENOUS at 08:15

## 2022-01-01 RX ADMIN — Medication 100 MILLIEQUIVALENT(S): at 09:12

## 2022-01-01 RX ADMIN — DEXMEDETOMIDINE HYDROCHLORIDE IN 0.9% SODIUM CHLORIDE 3.22 MICROGRAM(S)/KG/HR: 4 INJECTION INTRAVENOUS at 01:07

## 2022-01-01 RX ADMIN — FENTANYL CITRATE 50 MICROGRAM(S): 50 INJECTION INTRAVENOUS at 17:06

## 2022-01-01 RX ADMIN — CHLORHEXIDINE GLUCONATE 1 APPLICATION(S): 213 SOLUTION TOPICAL at 05:54

## 2022-01-01 RX ADMIN — SODIUM CHLORIDE 100 MILLILITER(S): 9 INJECTION, SOLUTION INTRAVENOUS at 20:55

## 2022-01-01 RX ADMIN — HYDROMORPHONE HYDROCHLORIDE 1 MILLIGRAM(S): 2 INJECTION INTRAMUSCULAR; INTRAVENOUS; SUBCUTANEOUS at 12:33

## 2022-01-01 RX ADMIN — MORPHINE 6 MILLIGRAM(S): 10 SOLUTION ORAL at 17:57

## 2022-01-01 RX ADMIN — Medication 1 PACKET(S): at 18:05

## 2022-01-01 RX ADMIN — VALSARTAN 20 MILLIGRAM(S): 80 TABLET ORAL at 23:33

## 2022-01-01 RX ADMIN — CHLORHEXIDINE GLUCONATE 1 APPLICATION(S): 213 SOLUTION TOPICAL at 06:05

## 2022-01-01 RX ADMIN — Medication 400 MILLIGRAM(S): at 20:35

## 2022-01-01 RX ADMIN — SODIUM CHLORIDE 500 MILLILITER(S): 9 INJECTION INTRAMUSCULAR; INTRAVENOUS; SUBCUTANEOUS at 10:20

## 2022-01-01 RX ADMIN — Medication 12.5 MILLIGRAM(S): at 08:21

## 2022-01-01 RX ADMIN — POTASSIUM PHOSPHATE, MONOBASIC POTASSIUM PHOSPHATE, DIBASIC 62.5 MILLIMOLE(S): 236; 224 INJECTION, SOLUTION INTRAVENOUS at 07:55

## 2022-01-01 RX ADMIN — Medication 2 MILLIGRAM(S): at 07:02

## 2022-01-01 RX ADMIN — SODIUM CHLORIDE 1000 MILLILITER(S): 9 INJECTION, SOLUTION INTRAVENOUS at 13:00

## 2022-01-01 RX ADMIN — Medication 2 MILLIGRAM(S): at 06:09

## 2022-01-01 RX ADMIN — AMIODARONE HYDROCHLORIDE 16.7 MG/MIN: 400 TABLET ORAL at 13:11

## 2022-01-01 RX ADMIN — AMIODARONE HYDROCHLORIDE 16.7 MG/MIN: 400 TABLET ORAL at 19:39

## 2022-01-01 RX ADMIN — PANTOPRAZOLE SODIUM 40 MILLIGRAM(S): 20 TABLET, DELAYED RELEASE ORAL at 06:12

## 2022-01-01 RX ADMIN — Medication 325 MILLIGRAM(S): at 17:59

## 2022-01-01 RX ADMIN — Medication 1000 MILLIGRAM(S): at 22:45

## 2022-01-01 RX ADMIN — Medication 1 APPLICATION(S): at 12:11

## 2022-01-01 RX ADMIN — Medication 100 GRAM(S): at 06:44

## 2022-01-01 RX ADMIN — Medication 2.5 MILLIGRAM(S): at 17:00

## 2022-01-01 RX ADMIN — HEPARIN SODIUM 13 UNIT(S)/HR: 5000 INJECTION INTRAVENOUS; SUBCUTANEOUS at 11:40

## 2022-01-01 RX ADMIN — PANTOPRAZOLE SODIUM 40 MILLIGRAM(S): 20 TABLET, DELAYED RELEASE ORAL at 05:21

## 2022-01-01 RX ADMIN — Medication 150 MILLIGRAM(S): at 12:28

## 2022-01-01 RX ADMIN — PIPERACILLIN AND TAZOBACTAM 25 GRAM(S): 4; .5 INJECTION, POWDER, LYOPHILIZED, FOR SOLUTION INTRAVENOUS at 13:11

## 2022-01-01 RX ADMIN — Medication 3 MILLILITER(S): at 21:11

## 2022-01-01 RX ADMIN — PANTOPRAZOLE SODIUM 40 MILLIGRAM(S): 20 TABLET, DELAYED RELEASE ORAL at 17:21

## 2022-01-01 RX ADMIN — PIPERACILLIN AND TAZOBACTAM 25 GRAM(S): 4; .5 INJECTION, POWDER, LYOPHILIZED, FOR SOLUTION INTRAVENOUS at 11:14

## 2022-01-01 RX ADMIN — CHLORHEXIDINE GLUCONATE 1 APPLICATION(S): 213 SOLUTION TOPICAL at 06:06

## 2022-01-01 RX ADMIN — PANTOPRAZOLE SODIUM 40 MILLIGRAM(S): 20 TABLET, DELAYED RELEASE ORAL at 06:31

## 2022-01-01 RX ADMIN — SODIUM CHLORIDE 100 MILLILITER(S): 9 INJECTION INTRAMUSCULAR; INTRAVENOUS; SUBCUTANEOUS at 14:56

## 2022-01-01 RX ADMIN — PANTOPRAZOLE SODIUM 40 MILLIGRAM(S): 20 TABLET, DELAYED RELEASE ORAL at 17:51

## 2022-01-01 RX ADMIN — Medication 1 APPLICATION(S): at 21:30

## 2022-01-01 RX ADMIN — Medication 5 MILLIGRAM(S): at 06:44

## 2022-01-01 RX ADMIN — Medication 100 MILLIEQUIVALENT(S): at 09:46

## 2022-01-01 RX ADMIN — Medication 50 MILLILITER(S): at 06:13

## 2022-01-01 RX ADMIN — Medication 81 MILLIGRAM(S): at 13:22

## 2022-01-01 RX ADMIN — HEPARIN SODIUM 13 UNIT(S)/HR: 5000 INJECTION INTRAVENOUS; SUBCUTANEOUS at 06:13

## 2022-01-01 RX ADMIN — AMIODARONE HYDROCHLORIDE 16.7 MG/MIN: 400 TABLET ORAL at 11:00

## 2022-01-01 RX ADMIN — MIDAZOLAM HYDROCHLORIDE 1.29 MG/KG/HR: 1 INJECTION, SOLUTION INTRAMUSCULAR; INTRAVENOUS at 20:35

## 2022-01-01 RX ADMIN — ONDANSETRON 4 MILLIGRAM(S): 8 TABLET, FILM COATED ORAL at 22:02

## 2022-01-01 RX ADMIN — HYDROMORPHONE HYDROCHLORIDE 1 MILLIGRAM(S): 2 INJECTION INTRAMUSCULAR; INTRAVENOUS; SUBCUTANEOUS at 02:37

## 2022-01-01 RX ADMIN — AMIODARONE HYDROCHLORIDE 33.3 MG/MIN: 400 TABLET ORAL at 13:24

## 2022-01-01 RX ADMIN — DEXMEDETOMIDINE HYDROCHLORIDE IN 0.9% SODIUM CHLORIDE 1.61 MICROGRAM(S)/KG/HR: 4 INJECTION INTRAVENOUS at 00:19

## 2022-01-01 RX ADMIN — Medication 1000 MILLIGRAM(S): at 02:53

## 2022-01-01 RX ADMIN — VASOPRESSIN 6 UNIT(S)/MIN: 20 INJECTION INTRAVENOUS at 00:07

## 2022-01-01 RX ADMIN — Medication 1000 MILLIGRAM(S): at 14:50

## 2022-01-01 RX ADMIN — Medication 4 MILLIGRAM(S): at 15:10

## 2022-01-01 RX ADMIN — Medication 81 MILLIGRAM(S): at 12:01

## 2022-01-01 RX ADMIN — HEPARIN SODIUM 8 UNIT(S)/HR: 5000 INJECTION INTRAVENOUS; SUBCUTANEOUS at 06:02

## 2022-01-01 RX ADMIN — Medication 2.5 MILLIGRAM(S): at 16:34

## 2022-01-01 RX ADMIN — SODIUM CHLORIDE 500 MILLILITER(S): 9 INJECTION INTRAMUSCULAR; INTRAVENOUS; SUBCUTANEOUS at 09:28

## 2022-01-01 RX ADMIN — SODIUM CHLORIDE 100 MILLILITER(S): 9 INJECTION INTRAMUSCULAR; INTRAVENOUS; SUBCUTANEOUS at 13:32

## 2022-01-01 RX ADMIN — Medication 1 PACKET(S): at 05:56

## 2022-01-01 RX ADMIN — CHLORHEXIDINE GLUCONATE 1 APPLICATION(S): 213 SOLUTION TOPICAL at 07:01

## 2022-01-01 RX ADMIN — Medication 100 MILLIEQUIVALENT(S): at 07:42

## 2022-01-01 RX ADMIN — VASOPRESSIN 6 UNIT(S)/MIN: 20 INJECTION INTRAVENOUS at 07:40

## 2022-01-01 RX ADMIN — Medication 12.5 MILLIGRAM(S): at 05:53

## 2022-01-01 RX ADMIN — PANTOPRAZOLE SODIUM 40 MILLIGRAM(S): 20 TABLET, DELAYED RELEASE ORAL at 18:35

## 2022-01-01 RX ADMIN — Medication 81 MILLIGRAM(S): at 11:40

## 2022-01-01 RX ADMIN — Medication 650 MILLIGRAM(S): at 11:02

## 2022-01-01 RX ADMIN — SODIUM CHLORIDE 100 MILLILITER(S): 9 INJECTION INTRAMUSCULAR; INTRAVENOUS; SUBCUTANEOUS at 20:35

## 2022-01-01 RX ADMIN — Medication 3 MILLILITER(S): at 16:51

## 2022-01-01 RX ADMIN — HEPARIN SODIUM 1400 UNIT(S)/HR: 5000 INJECTION INTRAVENOUS; SUBCUTANEOUS at 01:26

## 2022-01-01 RX ADMIN — DEXMEDETOMIDINE HYDROCHLORIDE IN 0.9% SODIUM CHLORIDE 3.22 MICROGRAM(S)/KG/HR: 4 INJECTION INTRAVENOUS at 06:02

## 2022-01-01 RX ADMIN — HEPARIN SODIUM 8 UNIT(S)/HR: 5000 INJECTION INTRAVENOUS; SUBCUTANEOUS at 06:33

## 2022-01-01 RX ADMIN — PIPERACILLIN AND TAZOBACTAM 200 GRAM(S): 4; .5 INJECTION, POWDER, LYOPHILIZED, FOR SOLUTION INTRAVENOUS at 11:23

## 2022-01-01 RX ADMIN — Medication 400 MILLIGRAM(S): at 02:38

## 2022-01-01 RX ADMIN — Medication 6.04 MICROGRAM(S)/KG/MIN: at 03:48

## 2022-01-01 RX ADMIN — POTASSIUM PHOSPHATE, MONOBASIC POTASSIUM PHOSPHATE, DIBASIC 62.5 MILLIMOLE(S): 236; 224 INJECTION, SOLUTION INTRAVENOUS at 09:49

## 2022-01-01 RX ADMIN — FAMOTIDINE 20 MILLIGRAM(S): 10 INJECTION INTRAVENOUS at 04:22

## 2022-01-01 RX ADMIN — FENTANYL CITRATE 3.22 MICROGRAM(S)/KG/HR: 50 INJECTION INTRAVENOUS at 06:28

## 2022-01-01 RX ADMIN — Medication 1 PACKET(S): at 06:55

## 2022-01-01 RX ADMIN — Medication 100 MILLIEQUIVALENT(S): at 05:56

## 2022-01-01 RX ADMIN — PANTOPRAZOLE SODIUM 40 MILLIGRAM(S): 20 TABLET, DELAYED RELEASE ORAL at 06:04

## 2022-01-01 RX ADMIN — Medication 100 MILLIEQUIVALENT(S): at 08:23

## 2022-01-01 RX ADMIN — Medication 1 PACKET(S): at 07:44

## 2022-01-01 RX ADMIN — Medication 62.5 MILLIMOLE(S): at 07:53

## 2022-01-01 RX ADMIN — Medication 3 MILLILITER(S): at 04:36

## 2022-01-01 RX ADMIN — MIDAZOLAM HYDROCHLORIDE 1.29 MG/KG/HR: 1 INJECTION, SOLUTION INTRAMUSCULAR; INTRAVENOUS at 08:26

## 2022-01-01 RX ADMIN — ALTEPLASE 2 MILLIGRAM(S): KIT at 13:30

## 2022-01-01 RX ADMIN — Medication 3 MILLILITER(S): at 09:17

## 2022-01-01 RX ADMIN — Medication 3 MILLILITER(S): at 03:34

## 2022-01-01 RX ADMIN — CHLORHEXIDINE GLUCONATE 15 MILLILITER(S): 213 SOLUTION TOPICAL at 06:04

## 2022-01-01 RX ADMIN — CHLORHEXIDINE GLUCONATE 1 APPLICATION(S): 213 SOLUTION TOPICAL at 07:00

## 2022-01-01 RX ADMIN — Medication 50 MILLIEQUIVALENT(S): at 11:40

## 2022-01-01 RX ADMIN — PIPERACILLIN AND TAZOBACTAM 25 GRAM(S): 4; .5 INJECTION, POWDER, LYOPHILIZED, FOR SOLUTION INTRAVENOUS at 03:46

## 2022-01-01 RX ADMIN — FENTANYL CITRATE 3.22 MICROGRAM(S)/KG/HR: 50 INJECTION INTRAVENOUS at 09:11

## 2022-01-01 RX ADMIN — SODIUM CHLORIDE 100 MILLILITER(S): 9 INJECTION, SOLUTION INTRAVENOUS at 06:14

## 2022-01-01 RX ADMIN — HEPARIN SODIUM 11 UNIT(S)/HR: 5000 INJECTION INTRAVENOUS; SUBCUTANEOUS at 11:19

## 2022-01-01 RX ADMIN — SODIUM CHLORIDE 500 MILLILITER(S): 9 INJECTION INTRAMUSCULAR; INTRAVENOUS; SUBCUTANEOUS at 21:19

## 2022-01-01 RX ADMIN — POTASSIUM PHOSPHATE, MONOBASIC POTASSIUM PHOSPHATE, DIBASIC 83.33 MILLIMOLE(S): 236; 224 INJECTION, SOLUTION INTRAVENOUS at 06:11

## 2022-01-01 RX ADMIN — FENTANYL CITRATE 3.22 MICROGRAM(S)/KG/HR: 50 INJECTION INTRAVENOUS at 07:36

## 2022-01-01 RX ADMIN — Medication 12.5 MILLIGRAM(S): at 19:43

## 2022-01-01 RX ADMIN — PANTOPRAZOLE SODIUM 40 MILLIGRAM(S): 20 TABLET, DELAYED RELEASE ORAL at 17:54

## 2022-01-01 RX ADMIN — PIPERACILLIN AND TAZOBACTAM 25 GRAM(S): 4; .5 INJECTION, POWDER, LYOPHILIZED, FOR SOLUTION INTRAVENOUS at 22:53

## 2022-01-01 RX ADMIN — ATORVASTATIN CALCIUM 40 MILLIGRAM(S): 80 TABLET, FILM COATED ORAL at 21:35

## 2022-01-01 RX ADMIN — Medication 25 MILLIGRAM(S): at 14:06

## 2022-01-01 RX ADMIN — Medication 25 GRAM(S): at 11:47

## 2022-01-01 RX ADMIN — Medication 6.04 MICROGRAM(S)/KG/MIN: at 12:32

## 2022-01-01 RX ADMIN — SODIUM CHLORIDE 125 MILLILITER(S): 9 INJECTION, SOLUTION INTRAVENOUS at 18:11

## 2022-01-01 RX ADMIN — Medication 650 MILLIGRAM(S): at 19:00

## 2022-01-01 RX ADMIN — PANTOPRAZOLE SODIUM 40 MILLIGRAM(S): 20 TABLET, DELAYED RELEASE ORAL at 06:20

## 2022-01-01 RX ADMIN — VALSARTAN 20 MILLIGRAM(S): 80 TABLET ORAL at 06:19

## 2022-01-01 RX ADMIN — PANTOPRAZOLE SODIUM 40 MILLIGRAM(S): 20 TABLET, DELAYED RELEASE ORAL at 18:05

## 2022-01-01 RX ADMIN — Medication 5 MILLIGRAM(S): at 19:44

## 2022-01-01 RX ADMIN — Medication 1000 MILLIGRAM(S): at 19:55

## 2022-01-01 RX ADMIN — PIPERACILLIN AND TAZOBACTAM 25 GRAM(S): 4; .5 INJECTION, POWDER, LYOPHILIZED, FOR SOLUTION INTRAVENOUS at 05:03

## 2022-01-01 RX ADMIN — HEPARIN SODIUM 5000 UNIT(S): 5000 INJECTION INTRAVENOUS; SUBCUTANEOUS at 14:06

## 2022-01-01 RX ADMIN — HEPARIN SODIUM 12 UNIT(S)/HR: 5000 INJECTION INTRAVENOUS; SUBCUTANEOUS at 14:54

## 2022-01-01 RX ADMIN — Medication 100 MILLIEQUIVALENT(S): at 07:43

## 2022-01-01 RX ADMIN — Medication 25 MILLIGRAM(S): at 18:50

## 2022-01-01 RX ADMIN — Medication 1 APPLICATION(S): at 12:34

## 2022-01-01 RX ADMIN — Medication 650 MILLIGRAM(S): at 18:10

## 2022-01-01 RX ADMIN — AMIODARONE HYDROCHLORIDE 16.7 MG/MIN: 400 TABLET ORAL at 18:01

## 2022-01-01 RX ADMIN — PANTOPRAZOLE SODIUM 40 MILLIGRAM(S): 20 TABLET, DELAYED RELEASE ORAL at 05:52

## 2022-01-01 RX ADMIN — CHLORHEXIDINE GLUCONATE 15 MILLILITER(S): 213 SOLUTION TOPICAL at 17:00

## 2022-01-01 RX ADMIN — PIPERACILLIN AND TAZOBACTAM 200 GRAM(S): 4; .5 INJECTION, POWDER, LYOPHILIZED, FOR SOLUTION INTRAVENOUS at 09:27

## 2022-01-01 RX ADMIN — Medication 3 MILLILITER(S): at 17:39

## 2022-01-01 RX ADMIN — Medication 50 MILLIEQUIVALENT(S): at 13:51

## 2022-01-01 RX ADMIN — PIPERACILLIN AND TAZOBACTAM 25 GRAM(S): 4; .5 INJECTION, POWDER, LYOPHILIZED, FOR SOLUTION INTRAVENOUS at 11:04

## 2022-01-01 RX ADMIN — CHLORHEXIDINE GLUCONATE 15 MILLILITER(S): 213 SOLUTION TOPICAL at 17:17

## 2022-01-01 RX ADMIN — MIDAZOLAM HYDROCHLORIDE 1 MILLIGRAM(S): 1 INJECTION, SOLUTION INTRAMUSCULAR; INTRAVENOUS at 20:51

## 2022-01-01 RX ADMIN — Medication 12.5 MILLIGRAM(S): at 06:02

## 2022-01-01 RX ADMIN — PANTOPRAZOLE SODIUM 40 MILLIGRAM(S): 20 TABLET, DELAYED RELEASE ORAL at 05:25

## 2022-01-01 RX ADMIN — PANTOPRAZOLE SODIUM 40 MILLIGRAM(S): 20 TABLET, DELAYED RELEASE ORAL at 18:09

## 2022-01-01 RX ADMIN — PIPERACILLIN AND TAZOBACTAM 25 GRAM(S): 4; .5 INJECTION, POWDER, LYOPHILIZED, FOR SOLUTION INTRAVENOUS at 13:03

## 2022-01-01 RX ADMIN — Medication 400 MILLIGRAM(S): at 15:18

## 2022-01-01 RX ADMIN — AMIODARONE HYDROCHLORIDE 400 MILLIGRAM(S): 400 TABLET ORAL at 11:05

## 2022-01-01 RX ADMIN — SODIUM CHLORIDE 100 MILLILITER(S): 9 INJECTION INTRAMUSCULAR; INTRAVENOUS; SUBCUTANEOUS at 05:04

## 2022-01-01 RX ADMIN — Medication 204 MICROGRAM(S): at 23:53

## 2022-01-01 RX ADMIN — Medication 650 MILLIGRAM(S): at 10:19

## 2022-01-01 RX ADMIN — PANTOPRAZOLE SODIUM 40 MILLIGRAM(S): 20 TABLET, DELAYED RELEASE ORAL at 17:03

## 2022-01-01 RX ADMIN — Medication 204 MICROGRAM(S): at 19:10

## 2022-01-01 RX ADMIN — SODIUM CHLORIDE 500 MILLILITER(S): 9 INJECTION INTRAMUSCULAR; INTRAVENOUS; SUBCUTANEOUS at 13:10

## 2022-01-01 RX ADMIN — PIPERACILLIN AND TAZOBACTAM 25 GRAM(S): 4; .5 INJECTION, POWDER, LYOPHILIZED, FOR SOLUTION INTRAVENOUS at 21:11

## 2022-10-21 ENCOUNTER — HOSPITAL ENCOUNTER (INPATIENT)
Dept: HOSPITAL 74 - YASAS | Age: 77
LOS: 5 days | Discharge: HOME | DRG: 897 | End: 2022-10-26
Attending: SURGERY | Admitting: ALLERGY & IMMUNOLOGY
Payer: COMMERCIAL

## 2022-10-21 VITALS — BODY MASS INDEX: 16.2 KG/M2

## 2022-10-21 DIAGNOSIS — M54.50: ICD-10-CM

## 2022-10-21 DIAGNOSIS — M86.171: ICD-10-CM

## 2022-10-21 DIAGNOSIS — I10: ICD-10-CM

## 2022-10-21 DIAGNOSIS — F17.210: ICD-10-CM

## 2022-10-21 DIAGNOSIS — R63.4: ICD-10-CM

## 2022-10-21 DIAGNOSIS — R54: ICD-10-CM

## 2022-10-21 DIAGNOSIS — F11.23: Primary | ICD-10-CM

## 2022-10-21 DIAGNOSIS — E87.6: ICD-10-CM

## 2022-10-21 DIAGNOSIS — G89.29: ICD-10-CM

## 2022-10-21 PROCEDURE — U0005 INFEC AGEN DETEC AMPLI PROBE: HCPCS

## 2022-10-21 PROCEDURE — U0003 INFECTIOUS AGENT DETECTION BY NUCLEIC ACID (DNA OR RNA); SEVERE ACUTE RESPIRATORY SYNDROME CORONAVIRUS 2 (SARS-COV-2) (CORONAVIRUS DISEASE [COVID-19]), AMPLIFIED PROBE TECHNIQUE, MAKING USE OF HIGH THROUGHPUT TECHNOLOGIES AS DESCRIBED BY CMS-2020-01-R: HCPCS

## 2022-10-21 PROCEDURE — HZ2ZZZZ DETOXIFICATION SERVICES FOR SUBSTANCE ABUSE TREATMENT: ICD-10-PCS | Performed by: SURGERY

## 2022-10-21 RX ADMIN — SULFAMETHOXAZOLE AND TRIMETHOPRIM SCH EACH: 800; 160 TABLET ORAL at 23:15

## 2022-10-21 RX ADMIN — Medication SCH MG: at 22:38

## 2022-10-22 LAB
ALBUMIN SERPL-MCNC: 3.7 G/DL (ref 3.4–5)
ALP SERPL-CCNC: 121 U/L (ref 45–117)
ALT SERPL-CCNC: 36 U/L (ref 13–61)
ANION GAP SERPL CALC-SCNC: 10 MMOL/L (ref 8–16)
AST SERPL-CCNC: 50 U/L (ref 15–37)
BILIRUB SERPL-MCNC: 1 MG/DL (ref 0.2–1)
BUN SERPL-MCNC: 12.6 MG/DL (ref 7–18)
CALCIUM SERPL-MCNC: 9.1 MG/DL (ref 8.5–10.1)
CHLORIDE SERPL-SCNC: 96 MMOL/L (ref 98–107)
CO2 SERPL-SCNC: 31 MMOL/L (ref 21–32)
CREAT SERPL-MCNC: 1 MG/DL (ref 0.55–1.3)
DEPRECATED RDW RBC AUTO: 15.7 % (ref 11.9–15.9)
GLUCOSE SERPL-MCNC: 91 MG/DL (ref 74–106)
HCT VFR BLD CALC: 49 % (ref 35.4–49)
HGB BLD-MCNC: 15.7 GM/DL (ref 11.7–16.9)
MCH RBC QN AUTO: 28.4 PG (ref 25.7–33.7)
MCHC RBC AUTO-ENTMCNC: 32.1 G/DL (ref 32–35.9)
MCV RBC: 88.4 FL (ref 80–96)
PLATELET # BLD AUTO: 240 10^3/UL (ref 134–434)
PMV BLD: 7.8 FL (ref 7.5–11.1)
PROT SERPL-MCNC: 7.7 G/DL (ref 6.4–8.2)
RBC # BLD AUTO: 5.54 M/MM3 (ref 4–5.6)
SODIUM SERPL-SCNC: 137 MMOL/L (ref 136–145)
WBC # BLD AUTO: 9.6 K/MM3 (ref 4–10)

## 2022-10-22 RX ADMIN — Medication SCH MG: at 22:44

## 2022-10-22 RX ADMIN — SULFAMETHOXAZOLE AND TRIMETHOPRIM SCH EACH: 800; 160 TABLET ORAL at 09:45

## 2022-10-22 RX ADMIN — SULFAMETHOXAZOLE AND TRIMETHOPRIM SCH EACH: 800; 160 TABLET ORAL at 22:44

## 2022-10-22 RX ADMIN — BACITRACIN ZINC SCH GM: 500 OINTMENT TOPICAL at 09:45

## 2022-10-22 RX ADMIN — Medication SCH TAB: at 09:45

## 2022-10-23 RX ADMIN — BACITRACIN ZINC SCH GM: 500 OINTMENT TOPICAL at 10:58

## 2022-10-23 RX ADMIN — SULFAMETHOXAZOLE AND TRIMETHOPRIM SCH EACH: 800; 160 TABLET ORAL at 10:58

## 2022-10-23 RX ADMIN — SULFAMETHOXAZOLE AND TRIMETHOPRIM SCH EACH: 800; 160 TABLET ORAL at 23:14

## 2022-10-23 RX ADMIN — Medication SCH TAB: at 10:58

## 2022-10-23 RX ADMIN — Medication SCH MG: at 23:14

## 2022-10-23 RX ADMIN — Medication SCH MG: at 23:13

## 2022-10-24 RX ADMIN — BACITRACIN ZINC SCH GM: 500 OINTMENT TOPICAL at 11:08

## 2022-10-24 RX ADMIN — Medication SCH TAB: at 11:08

## 2022-10-24 RX ADMIN — Medication SCH MG: at 22:42

## 2022-10-24 RX ADMIN — SULFAMETHOXAZOLE AND TRIMETHOPRIM SCH EACH: 800; 160 TABLET ORAL at 11:08

## 2022-10-24 RX ADMIN — SULFAMETHOXAZOLE AND TRIMETHOPRIM SCH EACH: 800; 160 TABLET ORAL at 22:42

## 2022-10-25 RX ADMIN — SULFAMETHOXAZOLE AND TRIMETHOPRIM SCH EACH: 800; 160 TABLET ORAL at 22:46

## 2022-10-25 RX ADMIN — Medication SCH MG: at 22:46

## 2022-10-25 RX ADMIN — BACITRACIN ZINC SCH GM: 500 OINTMENT TOPICAL at 11:09

## 2022-10-25 RX ADMIN — SULFAMETHOXAZOLE AND TRIMETHOPRIM SCH EACH: 800; 160 TABLET ORAL at 11:09

## 2022-10-25 RX ADMIN — Medication SCH TAB: at 11:09

## 2022-10-26 VITALS — SYSTOLIC BLOOD PRESSURE: 139 MMHG | DIASTOLIC BLOOD PRESSURE: 84 MMHG | HEART RATE: 102 BPM | TEMPERATURE: 97.8 F

## 2022-10-26 VITALS — RESPIRATION RATE: 16 BRPM

## 2022-10-26 RX ADMIN — Medication SCH TAB: at 10:33

## 2022-10-26 RX ADMIN — BACITRACIN ZINC SCH GM: 500 OINTMENT TOPICAL at 10:33

## 2022-10-26 RX ADMIN — SULFAMETHOXAZOLE AND TRIMETHOPRIM SCH EACH: 800; 160 TABLET ORAL at 10:33

## 2022-12-07 NOTE — H&P ADULT - ASSESSMENT
78 y/o M with no significant PMHx found unresponsive at his nursing home, resolved after Narcan in the field, and brought to [facility]. Found to have PE, atrial flutter, and large LV thrombus on echo. Transferred to [facility] for further management.    NEUROLOGY  #  CARDIOVASCULAR  #  PULMONARY  #  GASTROINTESTINAL  #  RENAL  #  INFECTIOUS DISEASE  #  ENDOCRINE  #  HEME  #  FLUIDS/ELECTROLYTES/NUTRITION  -IVF  -Monitor, Replete to K>4 and Mg>2  -Diet  PROPHYLAXIS  -DVT  -GI    DISPO  CODE STATUS 76 y/o M with no significant PMHx found unresponsive at his nursing home, resolved after Narcan in the field, and brought to [facility]. Found to have PE, atrial flutter, and large LV thrombus on echo. Transferred to [facility] for further management.    NEUROLOGY  #    CARDIOVASCULAR  #Atrial flutter  Found to have atrial flutter at [facility]. Transferred to [facility] for possible ablation. HR persistently 130 since arrival to [facility], showing atrial flutter on tele. EKG confirming atrial flutter with . Likely re-entrant tachycardia that will resolve after ablation or cardioversion.  - EP consulted for possible ablation vs cardioversion  - Perform LORENZO tomorrow to r/u LA thrombus   - Continue tele monitoring  - Daily EKG    #Acute CHF  No prior medical hx of HF. BNP 15k and echo with LV thrombus at [facility]. 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 Currently euvolemic.  - Repeat TTE  - Start Nipride 0.5 gtt  - Hold beta blocker for now    PULMONARY  #Subsegmental pulmonary embolism      GASTROINTESTINAL  ELEAZAR    RENAL  ELEAZAR    INFECTIOUS DISEASE  ELEAZAR    ENDOCRINE  ELEAZAR    HEME  ELEAZAR      F: none  E: replete PRN  N: DASH diet  DVT ppx: heparin  Dispo: CCU  Code: FULL 76 y/o M with no significant PMHx found unresponsive at his nursing home, resolved after Narcan in the field, and brought to [facility]. Found to have PE, atrial flutter, and large LV thrombus on echo. Transferred to [facility] for further management.    NEUROLOGY  #Opioid abuse  Urine tox positive for opioids.    CARDIOVASCULAR  #Atrial flutter  Found to have atrial flutter at [facility]. Transferred to [facility] for possible ablation. HR persistently 130 since arrival to [facility], showing atrial flutter on tele. EKG confirming atrial flutter with . Likely re-entrant tachycardia that will resolve after ablation or cardioversion.  - Give Digoxin 0.5 IVP x1 for rhythm control  - EP consulted for possible ablation vs cardioversion  - Perform LORENZO tomorrow to r/u LA thrombus   - Continue tele monitoring  - Daily EKG    #Acute CHF  No prior medical hx of HF. BNP 15k and echo with LV thrombus at [facility]. 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 Currently euvolemic.  - Repeat TTE  - Start Nipride 0.5 gtt  - Hold beta blocker for now    PULMONARY  #Pulmonary embolism  CTA at [facility] showing evidence of subsegmental PE of right upper lobe. Patient was started on Heparin gtt, then transferred to [facility]. No tachypnea, however patient saturating at 90-94% on room air. Will continue with full anticoagulation.  - Patient refusing nasal cannula  - C/w Heparin gtt with goal PTT 59-99    GASTROINTESTINAL  ELEAZAR    RENAL  ELEAZAR    INFECTIOUS DISEASE  ELEAZAR    ENDOCRINE  ELEAZAR    HEME  ELEAZAR      F: none  E: replete PRN  N: DASH diet  DVT ppx: heparin  Dispo: CCU  Code: FULL 76 y/o M with no significant PMHx found unresponsive at his nursing home, resolved after Narcan in the field, and brought to [facility]. Found to have PE, atrial flutter, and large LV thrombus on echo. Transferred to [facility] for further management.    NEUROLOGY  #Opioid abuse  Urine tox positive for opioids. Admitted to snorting cocaine and heroin for many years. Denies being an IVDU. Does not use methadone.  - Consider giving methadone 10mg with signs of withdrawal  - Will call psych consult if patient withdrawing    CARDIOVASCULAR  #Atrial flutter  Found to have atrial flutter at [facility]. Transferred to [facility] for possible ablation. HR persistently 130 since arrival to [facility], showing atrial flutter on tele. EKG confirming atrial flutter with . Likely re-entrant tachycardia that will resolve after ablation or cardioversion.  - Give Digoxin 0.5 IVP x1 for rhythm control  - EP consulted for possible ablation vs cardioversion  - Perform LORENZO tomorrow to r/u LA thrombus   - Continue tele monitoring  - Daily EKG    #Acute CHF  No prior medical hx of HF. BNP 15k and echo with LV thrombus at [facility]. 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 Currently euvolemic.  - Repeat TTE  - Start Nipride 0.5 gtt  - Hold beta blocker for now  - Goal MAP >70-75    PULMONARY  #Pulmonary embolism  CTA at [facility] showing evidence of subsegmental PE of right upper lobe. Patient was started on Heparin gtt, then transferred to [facility]. No tachypnea, however patient saturating at 90-94% on room air. Will continue with full anticoagulation.  - Patient refusing nasal cannula  - C/w Heparin gtt  - Goal PTT 59-99    GASTROINTESTINAL  ELEAZAR    RENAL  ELEAZAR    INFECTIOUS DISEASE  ELEAZAR    ENDOCRINE  ELEAZAR    HEME  ELEAZAR      F: none  E: replete PRN  N: DASH diet  DVT ppx: heparin  Dispo: CCU  Code: FULL

## 2022-12-07 NOTE — PATIENT PROFILE ADULT - FALL HARM RISK - HARM RISK INTERVENTIONS

## 2022-12-07 NOTE — PROVIDER CONTACT NOTE (OTHER) - SITUATION
Pt admitted to [facility] from [facility]  c/o Left leg pain, he sad he has for 4 days.

## 2022-12-07 NOTE — H&P ADULT - SOCIAL HISTORY: SUBSTANCE USE
admits to snorting cocaine and heroin for years. never an IVDU Admits to snorting cocaine and heroin for years.   Never an IVDU.

## 2022-12-07 NOTE — H&P ADULT - ATTENDING COMMENTS
77M w/ h/o opiate abuse, found to be unresponsive at nursing home, responded to narcan  -> found to have persistent AFlut w/ RVR, brought to be [facility] where he was also found to have new acute sCHF, now tx'd to [facility] CCU for further mgmt    -sCHF - acute systolic CHF - possibly Tachy-induced CM, has not had formal ischemic w/u; TTE: LVEF 10-15%, Large LV thrombus, Severely reduced RV function, normal RV size; Currently evidence of early cardiogenic shock w/ mild GOLDIE, Lactate mildly elevated, cool extremities, otherwise euvolemic on exam - Started Nipride gtt and titrate to goal MAP 70-75; Lactate has now cleared w/ downtrending Cr; Plan for rhythm control strategy tmrw - likely LORENZO/DCCV; Risk/benefit were clearly explained to patient with EP at bedside; Plan to initiate GDMT and repeat TTE post DCCV  -EP - Atrial Flutter w/ RVR ~140s, currently HD stable; Started on Heparin gtt; EP consulted - Plan for LORENZO/DCCV; Risk of Stroke explained to patient. However, if pt. remains in Aflutter he will continue to decompensate - which he understands; Give Digoxin IV x 1, though rate control unlikely to be successful, borderline CO so holding BB for now  -Pulm - Found to have PE on CTA chest, currently non-hypoxic and HD stable despite Aflutter; c/w Heparin gtt - will transition to Oral AC post DCCV  -DASH diet: NPO after MN  -DVT PPx  -Dispo: CCU    Dago Mares MD  CCU Attending

## 2022-12-07 NOTE — CONSULT NOTE ADULT - SUBJECTIVE AND OBJECTIVE BOX
HPI:    77 year old, nursing home resident, with no documented past medical history who was found unresponsive at his nursing home, given narcan with resolution, taken to [facility] for further workup. Pt noted to be persistently tachycardic, EKG at [facility] showing typical AFL. Pt also noted have RUL subsegmental PE, large 2.5x2.3cm LV thrombus, and LVEF 10-15%.     EP consulted for management of typical AFL. Pt persistently in flutter at 140bpm. BP stable.       PAST MEDICAL & SURGICAL HISTORY:      Social History: no smoking, no drugs, no ETOH      Inpatient Medications:   aspirin  chewable 81 milliGRAM(s) Oral daily  atorvastatin 40 milliGRAM(s) Oral at bedtime  chlorhexidine 2% Cloths 1 Application(s) Topical <User Schedule>  heparin  Infusion. 1100 Unit(s)/Hr IV Continuous <Continuous>  influenza  Vaccine (HIGH DOSE) 0.7 milliLiter(s) IntraMuscular once  nitroprusside Infusion 0.5 MICROgram(s)/kG/Min IV Continuous <Continuous>      Allergies: No Known Allergies      ROS:   CONSTITUTIONAL: No fever, weight loss + fatigue  EYES: Pt denies  RESPIRATORY: No cough, wheezing, chills or hemoptysis; No Shortness of Breath  CARDIOVASCULAR: see HPI  GASTROINTESTINAL: Pt denies  NEUROLOGICAL: Pt denies  SKIN: Pt denies   PSYCHIATRIC: Pt denies  HEME/LYMPH: Pt denies    PHYSICAL:  T(C): 36.3 (12-07-22 @ 11:22), Max: 36.3 (12-07-22 @ 11:22)  HR: 139 (12-07-22 @ 17:00) (133 - 139)  BP: 124/89 (12-07-22 @ 17:00) (116/82 - 126/95)  RR: 20 (12-07-22 @ 17:00) (13 - 32)  SpO2: 92% (12-07-22 @ 17:00) (92% - 99%)  Wt(kg): 64.4kG  Appearance: No acute distress  Eyes: normal appearing conjunctiva, pupils and eyelids  Cardiovascular: tachycardic and regular  Respiratory: Lungs clear to auscultation bilaterally.  No wheeze, rhonchi, rales noted  Gastrointestinal:  Soft, NT/ND 	  Neurologic:  No deficit noted  Psych: Alert and oriented, normal mood/affect  Skin: no rash noted, normal color and pigmentation.        LABS:                        17.1   7.97  )-----------( 209      ( 07 Dec 2022 10:42 )             52.2     12-07    140  |  104  |  39<H>  ----------------------------<  115<H>  4.7   |  24  |  1.26    Ca    8.8      07 Dec 2022 14:41  Phos  3.7     12-07  Mg     2.8     12-07    TPro  6.5  /  Alb  3.5  /  TBili  0.8  /  DBili  x   /  AST  38  /  ALT  22  /  AlkPhos  104  12-07    PT/INR - ( 07 Dec 2022 10:42 )   PT: 14.8 sec;   INR: 1.24          PTT - ( 07 Dec 2022 10:42 )  PTT:61.0 sec    ECHO:  Bedsid 12/7:  1. Limited study obtained for evaluation of left ventricular function.  2. Severely reduced left ventricular systolic function, ejection fraction  10-15% .  3. There is a 2.5 x 2.3 cm mobile echodensity in the left ventricular apex, consistent with a thrombus.  4. Normal right ventricular size.  5. Severely reduced right ventricular systolic function.  6. Left pleural effusion.  7. Trivial pericardial effusion without echocardiographic evidence of cardiac tamponade physiology.      Assessment Plan:  77 year old, nursing home resident, with no documented past medical history who was found unresponsive at his nursing home, given narcan with resolution, taken to [facility] for further workup. Pt noted to be persistently tachycardic, EKG at [facility] showing typical AFL. Pt also noted have RUL subsegmental PE, large 2.5x2.3cm LV thrombus, and LVEF 10-15%. EP consulted for management of atrial flutter. Patient is persistently tachycardic, concern for worsening cardiac outputs/ possible cardiogenic shock and therefore utility of beta blockers limited.     -Team to attempt further rate control with digoxin.   -May benefit from LORENZO + DCCV for attempt at restoration of sinus rhythm if rates cannot be controlled.   -on heparin gtt for anticoagulation given AFL and LV thrombus.

## 2022-12-07 NOTE — H&P ADULT - NSHPPHYSICALEXAM_GEN_ALL_CORE
Constitutional: resting comfortably; NAD  HEENT: NC/AT, PERRL, EOMI, anicteric sclera, MMM  Neck: supple; no JVD or thyromegaly  Respiratory: CTA B/L; no W/R/R, no retractions  Cardiac: +S1/S2; RRR; no M/R/G  Gastrointestinal: soft, NT/ND; no rebound or guarding; +BSx4  Extremities: WWP, no clubbing or cyanosis; no peripheral edema  Musculoskeletal: NROM x4; no joint swelling, tenderness or erythema  Vascular: 2+ radial, DP/PT pulses B/L  Dermatologic: skin warm, dry and intact; no rashes, wounds, or scars  Neurologic: AAOx3, no focal deficits  Psychiatric: calm, cooperative, behaviors are appropriate, denies SI/HI Constitutional: elderly male resting comfortably; NAD  HEENT: NC/AT, PERRL, anicteric sclera, MMM  Neck: supple; no JVD  Respiratory: CTA B/L; no W/R/R, no retractions  Cardiac: +S1/S2; +tachycardic, no murmurs   Gastrointestinal: soft, NT/ND; no rebound or guarding; +BSx4  Extremities: +cold feet but warm legs, no clubbing or cyanosis; no peripheral edema  Vascular: 2+ radial, unable to palpate DP/PT pulses, also not found on dopplers  Dermatologic: skin warm, dry and intact; no rashes, wounds, or scars  Neurologic: AAOx3, no focal deficits  Psychiatric: calm, cooperative, tangential thoughts Constitutional: elderly male resting comfortably; NAD  HEENT: NC/AT, PERRL, anicteric sclera, MMM  Neck: supple; no JVD  Respiratory: CTA B/L; no W/R/R, no retractions  Cardiac: +S1/S2; +tachycardic, no murmurs   Gastrointestinal: soft, NT/ND; no rebound or guarding; +BS  Extremities: +cold feet but warm legs, no clubbing or cyanosis; no peripheral edema  Vascular: 2+ radial, unable to palpate DP/PT pulses, also not found on dopplers  Dermatologic: skin warm, dry and intact; no rashes, wounds, or scars  Neurologic: AAOx3, no focal deficits  Psychiatric: calm, cooperative, tangential thoughts

## 2022-12-07 NOTE — H&P ADULT - NSHPLABSRESULTS_GEN_ALL_CORE
LABS:                        17.1   7.97  )-----------( 209      ( 07 Dec 2022 10:42 )             52.2     12-07    140  |  104  |  39<H>  ----------------------------<  115<H>  4.7   |  24  |  1.26    Ca    8.8      07 Dec 2022 14:41  Phos  3.7     12-07  Mg     2.8     12-07    TPro  6.5  /  Alb  3.5  /  TBili  0.8  /  DBili  x   /  AST  38  /  ALT  22  /  AlkPhos  104  12-07    PT/INR - ( 07 Dec 2022 10:42 )   PT: 14.8 sec;   INR: 1.24          PTT - ( 07 Dec 2022 10:42 )  PTT:61.0 sec      RADIOLOGY & ADDITIONAL TESTS: Reviewed

## 2022-12-07 NOTE — PATIENT PROFILE ADULT - NS PRO AD PATIENT TYPE ON CHART
Health Care Proxy (HCP) Health Care Proxy (HCP)/Do Not Resuscitate (DNR)/Medical Orders for Life-Sustaining Treatment (MOLST)

## 2022-12-07 NOTE — H&P ADULT - HISTORY OF PRESENT ILLNESS
Patient is a 76 y/o male with no significant past medical history BIBA from nursing home due to unresponsiveness. Patient was reportedly found unresponsive at his nursing home, Narcan was given in the field with resolution, and patient was taken to [facility]. Pt was persistently tachycardic in 130-140s despite receiving Adenosine 6mg and then 12mg IV, Lopressor 2.5mg x2, PO Metoprolol tartrate 100mg. Utox was positive for opioids. CTPA suggestive of subsegmental PE in the right upper lobe. Echo showed severely reduced LV function with an LV thrombus. Heparin gtt was started. Cardiology and EP were consulted due to atrial flutter, EP suggested transfer to [facility] for LORENZO and possible ablation.  Patient is a 76 y/o male with no significant past medical history BIBA from [facility] due to unresponsiveness. Patient was reportedly found unresponsive at his nursing home, Narcan was given in the field with resolution, and patient was taken to [facility]. Pt was persistently tachycardic in 130-140s despite receiving Adenosine 6mg IV and then 12mg IV, Lopressor 2.5mg x2, PO Metoprolol tartrate 100mg. Utox was positive for opioids. CTPA suggestive of subsegmental PE in the right upper lobe. Echo showed severely reduced LV function with an LV thrombus. Heparin gtt was started. Cardiology and EP were consulted due to atrial flutter. EP suggested transfer to [facility] for LORENZO and possible ablation.

## 2022-12-08 NOTE — PROGRESS NOTE ADULT - SUBJECTIVE AND OBJECTIVE BOX
EPS Progress Note    S:     Pt feeling slightly better, having mild shortness of breath though improving. Denies any chest pain, palpitations.     Received digoxin load overnight, total 1g.    Telemetry reveals patient still in atrial flutter but HR is more dynamic, mostly 2:1 occasionally having 3:1 and 4:1 AV block.    had a LORENZO today showing no evidence of left atrial appendage thrombus, but mild smoke is present.    O: T(C): 36.7 (12-08-22 @ 09:54), Max: 37.3 (12-07-22 @ 21:41)  HR: 127 (12-08-22 @ 09:54) (127 - 146)  BP: 111/63 (12-08-22 @ 09:54) (111/63 - 150/70)  RR: 18 (12-08-22 @ 09:54) (13 - 32)  SpO2: 94% (12-08-22 @ 09:54) (90% - 98%)    PHYSICAL  General:  NAD        Chest:  CTA B/L, no w/r/r  Cardiac:  irregularly irregular and tachycardic  Abdomen:   soft ND/NT  Extremities: No edema  Skin: no rash noted, normal color and pigmentation  Psych: A&Ox3, normal affect and mood  Neuro: no deficit noted     LABS:                        13.9   11.24 )-----------( 206      ( 08 Dec 2022 05:30 )             41.7     12-08    135  |  103  |  25<H>  ----------------------------<  108<H>  4.1   |  23  |  0.98    Ca    8.4      08 Dec 2022 05:30  Phos  2.1     12-08  Mg     2.4     12-08    TPro  6.0  /  Alb  3.0<L>  /  TBili  0.9  /  DBili  x   /  AST  43<H>  /  ALT  22  /  AlkPhos  88  12-08    PT/INR - ( 08 Dec 2022 05:30 )   PT: 16.7 sec;   INR: 1.40          PTT - ( 08 Dec 2022 05:30 )  PTT:60.7 sec      MEDICATIONS:  aMIOdarone Infusion 1 mG/Min IV Continuous <Continuous>  aMIOdarone Infusion 0.5 mG/Min IV Continuous <Continuous>  aMIOdarone IVPB 150 milliGRAM(s) IV Intermittent once  aspirin  chewable 81 milliGRAM(s) Oral daily  atorvastatin 40 milliGRAM(s) Oral at bedtime  chlorhexidine 2% Cloths 1 Application(s) Topical <User Schedule>  heparin  Infusion. 1100 Unit(s)/Hr IV Continuous <Continuous>  influenza  Vaccine (HIGH DOSE) 0.7 milliLiter(s) IntraMuscular once  nitroprusside Infusion 0.5 MICROgram(s)/kG/Min IV Continuous <Continuous>      ASSESSMENT/PLAN  77 year old, nursing home resident, no documented past medical history who was found unresponsive at his nursing home, given narcan with resolution, taken to [facility] for further workup. EKG at [place] showing typical AFL. Pt also noted have RUL subsegmental PE, large 2.5x2.3cm LV thrombus, and LVEF 10-15%. EP consulted for management of atrial flutter.     Rates are slightly improving with digoxin, no longer fixed at 140bpm. LORENZO with no atrial appendage thrombus, would add amiodarone to help with rate control. Pt may eventually need DCCV if adequate rate control is not attained but is at an increased risk of thromboembolic complications so will attempt to maximize rate control agents first line.     -start amio 1mg/min x6 hours, 0.5mg/min x18 hours, then start PO amiodarone load (400mg PO BID)  -Given interaction between amiodarone and digoxin, recommend to lower maintenance digoxin dose to 125mcg every other day with regular digoxin level checks.         EPS Progress Note    S:     Pt feeling slightly better, having mild shortness of breath though improving. Denies any chest pain, palpitations.     Received digoxin load overnight, total 1g.    Telemetry reveals patient still in atrial flutter but HR is more dynamic, mostly 2:1 occasionally conducting with 3:1 and 4:1 AV block.    had a LORENZO today showing no evidence of left atrial appendage thrombus, but mild smoke is present.    O: T(C): 36.7 (12-08-22 @ 09:54), Max: 37.3 (12-07-22 @ 21:41)  HR: 127 (12-08-22 @ 09:54) (127 - 146)  BP: 111/63 (12-08-22 @ 09:54) (111/63 - 150/70)  RR: 18 (12-08-22 @ 09:54) (13 - 32)  SpO2: 94% (12-08-22 @ 09:54) (90% - 98%)    PHYSICAL  General:  NAD        Chest:  CTA B/L, no w/r/r  Cardiac:  irregularly irregular and tachycardic  Abdomen:   soft ND/NT  Extremities: No edema  Skin: no rash noted, normal color and pigmentation  Psych: A&Ox3, normal affect and mood  Neuro: no deficit noted     LABS:                        13.9   11.24 )-----------( 206      ( 08 Dec 2022 05:30 )             41.7     12-08    135  |  103  |  25<H>  ----------------------------<  108<H>  4.1   |  23  |  0.98    Ca    8.4      08 Dec 2022 05:30  Phos  2.1     12-08  Mg     2.4     12-08    TPro  6.0  /  Alb  3.0<L>  /  TBili  0.9  /  DBili  x   /  AST  43<H>  /  ALT  22  /  AlkPhos  88  12-08    PT/INR - ( 08 Dec 2022 05:30 )   PT: 16.7 sec;   INR: 1.40          PTT - ( 08 Dec 2022 05:30 )  PTT:60.7 sec      MEDICATIONS:  aMIOdarone Infusion 1 mG/Min IV Continuous <Continuous>  aMIOdarone Infusion 0.5 mG/Min IV Continuous <Continuous>  aMIOdarone IVPB 150 milliGRAM(s) IV Intermittent once  aspirin  chewable 81 milliGRAM(s) Oral daily  atorvastatin 40 milliGRAM(s) Oral at bedtime  chlorhexidine 2% Cloths 1 Application(s) Topical <User Schedule>  heparin  Infusion. 1100 Unit(s)/Hr IV Continuous <Continuous>  influenza  Vaccine (HIGH DOSE) 0.7 milliLiter(s) IntraMuscular once  nitroprusside Infusion 0.5 MICROgram(s)/kG/Min IV Continuous <Continuous>      ASSESSMENT/PLAN  77 year old, nursing home resident, no documented past medical history who was found unresponsive at his nursing home, given narcan with resolution, taken to [facility] for further workup. EKG at [place] showing typical AFL. Pt also noted have RUL subsegmental PE, large 2.5x2.3cm LV thrombus, and LVEF 10-15%. EP consulted for management of atrial flutter.     Rates are slightly improving with digoxin, no longer fixed at 140bpm. LORENZO with no atrial appendage thrombus, would add amiodarone to help with rate control. Pt may eventually need DCCV if adequate rate control is not attained but is at an increased risk of thromboembolic complications so will attempt to maximize rate control agents first line.     -start amio 1mg/min x6 hours, 0.5mg/min x18 hours, then start PO amiodarone load (400mg PO BID)  -Given interaction between amiodarone and digoxin, recommend to lower maintenance digoxin dose to 125mcg every other day with regular digoxin level checks.  -continue heparin gtt

## 2022-12-08 NOTE — CONSULT NOTE ADULT - SUBJECTIVE AND OBJECTIVE BOX
Vascular Consult Note    HPI:  Patient is a 76 y/o male with no significant past medical history BIBA from [facility] due to unresponsiveness. Patient was reportedly found unresponsive at his nursing home, Narcan was given in the field with resolution, and patient was taken to [facility] [place]. Pt was persistently tachycardic in 130-140s despite receiving Adenosine 6mg IV and then 12mg IV, Lopressor 2.5mg x2, PO Metoprolol tartrate 100mg. Utox was positive for opioids. CTPA suggestive of subsegmental PE in the right upper lobe. Echo showed severely reduced LV function with an LV thrombus. Heparin gtt was started. Cardiology and EP were consulted due to atrial flutter. EP suggested transfer to [facility] for LORENZO and possible ablation.  (07 Dec 2022 12:26)      Attending:  Dr. Greenberg    Surgery Addendum: 76 y/o male with the history as stated above. Patient reports that he has intermittent pain in his left calf associated with increasing numbness in his left foot. Vascular was consulted for duplex finding of acute thrombus completely occluding the left popliteal artery and acute thrombus incompletely occluding the left dorsalis pedis artery and likely acute thrombus completely occluding the left mid superficial femoral artery and extending to the left popliteal artery. Patient normally denies any claudication, rest pain and is a nonsmoker.         PAST MEDICAL & SURGICAL HISTORY:      MEDICATIONS  (STANDING):  aMIOdarone Infusion 1 mG/Min (33.3 mL/Hr) IV Continuous <Continuous>  aMIOdarone Infusion 0.5 mG/Min (16.7 mL/Hr) IV Continuous <Continuous>  aspirin 325 milliGRAM(s) Oral once  atorvastatin 40 milliGRAM(s) Oral at bedtime  chlorhexidine 2% Cloths 1 Application(s) Topical <User Schedule>  heparin  Infusion. 1300 Unit(s)/Hr (13 mL/Hr) IV Continuous <Continuous>  influenza  Vaccine (HIGH DOSE) 0.7 milliLiter(s) IntraMuscular once  multivitamin 1 Tablet(s) Oral daily  nitroprusside Infusion 0.5 MICROgram(s)/kG/Min (4.83 mL/Hr) IV Continuous <Continuous>    MEDICATIONS  (PRN):      Allergies    No Known Allergies    Intolerances        SOCIAL HISTORY:    FAMILY HISTORY:      Vital Signs Last 24 Hrs  T(C): 36.7 (08 Dec 2022 09:54), Max: 37.3 (07 Dec 2022 21:41)  T(F): 98.1 (08 Dec 2022 08:49), Max: 99.1 (07 Dec 2022 21:41)  HR: 112 (08 Dec 2022 15:00) (112 - 146)  BP: 111/63 (08 Dec 2022 09:54) (111/63 - 150/70)  BP(mean): 82 (08 Dec 2022 09:54) (79 - 99)  RR: 38 (08 Dec 2022 15:00) (18 - 38)  SpO2: 97% (08 Dec 2022 15:00) (90% - 100%)    Parameters below as of 08 Dec 2022 15:00  Patient On (Oxygen Delivery Method): nasal cannula  O2 Flow (L/min): 2      I&O's Summary    07 Dec 2022 07:01  -  08 Dec 2022 07:00  --------------------------------------------------------  IN: 686.4 mL / OUT: 1300 mL / NET: -613.6 mL    08 Dec 2022 07:01  -  08 Dec 2022 17:08  --------------------------------------------------------  IN: 105.9 mL / OUT: 600 mL / NET: -494.1 mL        Physical Exam:  General: NAD, resting comfortably  HEENT: NC/AT,  Pulmonary: normal resp effort  Cardiovascular: NSR,  Abdominal: soft, ND/NT,  Extremities: left foot cool to touch, tender to palpation, decreased sensation over the dorsum of left foot, decreased motor in left toes, able to move ankle, palp fm bilaterally, right pop 2+, left pop biphasic, PT monophasic bilaterally, right DP biphasic, left DP no signals, right foot WWP      Lines/drains/tubes:    LABS:                        13.9   11.24 )-----------( 206      ( 08 Dec 2022 05:30 )             41.7     12-08    137  |  104  |  20  ----------------------------<  111<H>  4.0   |  21<L>  |  0.90    Ca    8.5      08 Dec 2022 12:26  Phos  2.1     12-08  Mg     2.4     12-08    TPro  6.5  /  Alb  3.2<L>  /  TBili  0.9  /  DBili  x   /  AST  47<H>  /  ALT  23  /  AlkPhos  94  12-08    PT/INR - ( 08 Dec 2022 05:30 )   PT: 16.7 sec;   INR: 1.40          PTT - ( 08 Dec 2022 05:30 )  PTT:60.7 sec    CAPILLARY BLOOD GLUCOSE        LIVER FUNCTIONS - ( 08 Dec 2022 12:26 )  Alb: 3.2 g/dL / Pro: 6.5 g/dL / ALK PHOS: 94 U/L / ALT: 23 U/L / AST: 47 U/L / GGT: x             Cultures:      RADIOLOGY & ADDITIONAL STUDIES:

## 2022-12-08 NOTE — DIETITIAN INITIAL EVALUATION ADULT - PERTINENT MEDS FT
MEDICATIONS  (STANDING):  aMIOdarone Infusion 1 mG/Min (33.3 mL/Hr) IV Continuous <Continuous>  aMIOdarone Infusion 0.5 mG/Min (16.7 mL/Hr) IV Continuous <Continuous>  aspirin  chewable 81 milliGRAM(s) Oral daily  atorvastatin 40 milliGRAM(s) Oral at bedtime  chlorhexidine 2% Cloths 1 Application(s) Topical <User Schedule>  heparin  Infusion. 1100 Unit(s)/Hr (11 mL/Hr) IV Continuous <Continuous>  influenza  Vaccine (HIGH DOSE) 0.7 milliLiter(s) IntraMuscular once  nitroprusside Infusion 0.5 MICROgram(s)/kG/Min (4.83 mL/Hr) IV Continuous <Continuous>    MEDICATIONS  (PRN):

## 2022-12-08 NOTE — PROGRESS NOTE ADULT - ASSESSMENT
76 y/o M with no significant PMHx found unresponsive at his nursing home, resolved after Narcan in the field, and brought to [facility]. Found to have PE, atrial flutter, and large LV thrombus on echo. Transferred to [facility] for further management.    NEUROLOGY  #Opioid abuse  Urine tox positive for opioids. Admitted to snorting cocaine and heroin for many years. Denies being an IVDU. Does not use methadone.  - Consider giving methadone 10mg with signs of withdrawal  - Will call psych consult if patient withdrawing    CARDIOVASCULAR  #Atrial flutter  Found to have atrial flutter at [facility]. Transferred to [facility] for possible ablation. HR persistently 130 since arrival to [facility], showing atrial flutter on tele. EKG confirming atrial flutter with . EP consulted for possible ablation vs cardioversion, however patient deemed too high risk. Will proceed with medical management. S/p Digoxin 1g load.   - Start Amiodarone 150mg IVP, then 1mg gtt x6 hours, then 0.5mg gtt x18 hours (to begin 10g load)  - May give Digoxin 0.25mg IVP every other day PRN  - Continue tele monitoring  - Daily EKG    #Acute CHF  No prior medical hx of HF. BNP 15k and echo with LV thrombus at [facility]. 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 Currently euvolemic. TTE 12/7: severely reduced LV systolic function with EF 10-15%, severely reduced RV systolic function, left pleural effusion, trivial pericardial effusion.   - C/w Nipride 0.5 gtt  - Hold beta blocker for now  - Goal MAP >70-75    #LV thrombus  Echo showing 2.5 x 2.3 cm mobile echodensity in the left ventricular apex, consistent with a thrombus.    #Absent LE pulses  Patient with cold LE extremities. Absent b/l LE pulses on dopplers.   - Obtain arterial duplex of b/l LE    PULMONARY  #Pulmonary embolism  CTA at [facility] showing evidence of subsegmental PE of right upper lobe. Patient was started on Heparin gtt, then transferred to [facility]. No tachypnea, however patient saturating at 90-94% on room air. Will continue with full anticoagulation.  - Patient refusing nasal cannula  - C/w Heparin gtt  - Goal PTT 59-99    GASTROINTESTINAL  #?Hep C infection  Unknown prior hx. Denies IVDU. Positive for Hep C screening.  - F/u Hep C RNA     RENAL  ELEAZAR    INFECTIOUS DISEASE  ELEAZAR    ENDOCRINE  ELEAZAR    HEME  ELEAZAR    PREVENTION:  F: none  E: replete PRN  N: DASH diet with Ensure x3  DVT ppx: heparin gtt  Dispo: CCU  Code: FULL 76 y/o M with no significant PMHx found unresponsive at his nursing home, resolved after Narcan in the field, and brought to [facility]. Found to have PE, atrial flutter, and large LV thrombus on echo. Transferred to [facility] for further management.    NEUROLOGY  #Opioid abuse  Urine tox positive for opioids. Admitted to snorting cocaine and heroin for many years. Denies being an IVDU. Does not use methadone.  - Consider giving methadone 10mg with signs of withdrawal  - Will call psych consult if patient withdrawing    CARDIOVASCULAR  #Atrial flutter  Found to have atrial flutter at [facility]. Transferred to [facility] for possible ablation. HR persistently 130 since arrival to [facility], showing atrial flutter on tele. EKG confirming atrial flutter with . EP consulted for possible ablation vs cardioversion, however patient deemed too high risk. Will proceed with medical management. S/p Digoxin 1g load.   - Start Amiodarone 150mg IVP, then 1mg gtt x6 hours, then 0.5mg gtt x18 hours (to begin 10g load)  - May give Digoxin 0.25mg IVP every other day PRN  - Continue tele monitoring  - Daily EKG    #Acute CHF  No prior medical hx of HF. BNP 15k and echo with LV thrombus at [facility]. 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 Currently euvolemic. TTE 12/7: severely reduced LV systolic function with EF 10-15%, severely reduced RV systolic function, left pleural effusion, trivial pericardial effusion.   - C/w Nipride 0.5 gtt  - Hold beta blocker for now  - Goal MAP >70-75    #Acute embolic limb ischemia  Patient with cold LE extremities. Absent b/l LE pulses on dopplers. Arterial duplex of b/l LE showing acute thrombus occluding left popliteal artery, acute thrombus completely occluding the left mid superficial femoral artery and extending to the left popliteal artery.   - Vascular consulted emergently  - Patient high risk for emergent high risk procedure, however no specific contraindications and no further cardiac workup indicated. Would continue current regimen of Amio gtt.     #LV thrombus  Echo showing 2.5 x 2.3 cm mobile echodensity in the left ventricular apex, consistent with a thrombus.  - No contraindications to vascular procedure    PULMONARY  #Pulmonary embolism  CTA at [facility] showing evidence of subsegmental PE of right upper lobe. Patient was started on Heparin gtt, then transferred to [facility]. No tachypnea, however patient saturating at 90-94% on room air. Will continue with full anticoagulation.  - Patient refusing nasal cannula  - C/w Heparin gtt  - Goal PTT 59-99    GASTROINTESTINAL  #?Hep C infection  Unknown prior hx. Denies IVDU. Positive for Hep C screening.  - F/u Hep C RNA     RENAL  ELEAZAR    INFECTIOUS DISEASE  ELEAZAR    ENDOCRINE  ELEAZAR    HEME  ELEAZAR    PREVENTION:  F: none  E: replete PRN  N: DASH diet with Ensure x3  DVT ppx: heparin gtt  Dispo: CCU  Code: FULL

## 2022-12-08 NOTE — PROGRESS NOTE ADULT - SUBJECTIVE AND OBJECTIVE BOX
CC: Patient is a 77y old  Male who presents with a chief complaint of A flutter.    INTERVAL EVENTS: Overnight, PTT was therapeutic. Digoxin 0.25mg given at 11pm and again at 5am. Patient HCV positive.    SUBJECTIVE / INTERVAL HPI: Patient seen and examined at bedside. No complaints at this time. Denies fevers, chills, chest pain, palpitations, SOB, cough, N/V.     ROS: negative unless otherwise stated above.    VITAL SIGNS:  Vital Signs Last 24 Hrs  T(C): 36.7 (08 Dec 2022 09:54), Max: 37.3 (07 Dec 2022 21:41)  T(F): 98.1 (08 Dec 2022 08:49), Max: 99.1 (07 Dec 2022 21:41)  HR: 112 (08 Dec 2022 14:00) (112 - 146)  BP: 111/63 (08 Dec 2022 09:54) (111/63 - 150/70)  BP(mean): 82 (08 Dec 2022 09:54) (79 - 103)  RR: 18 (08 Dec 2022 14:00) (18 - 32)  SpO2: 98% (08 Dec 2022 14:00) (90% - 100%)    Parameters below as of 08 Dec 2022 14:00  Patient On (Oxygen Delivery Method): nasal cannula  O2 Flow (L/min): 2      12-07-22 @ 07:01  -  12-08-22 @ 07:00  --------------------------------------------------------  IN: 686.4 mL / OUT: 1300 mL / NET: -613.6 mL    12-08-22 @ 07:01  -  12-08-22 @ 14:20  --------------------------------------------------------  IN: 105.9 mL / OUT: 600 mL / NET: -494.1 mL      PHYSICAL EXAM:  Constitutional: elderly male lying flat, resting comfortably; NAD  HEENT: NC/AT, anicteric sclera, MMM  Neck: supple; no JVD  Respiratory: CTA B/L; no W/R/R, no retractions  Cardiac: +distant heart sounds, +tachycardic  Gastrointestinal: soft, NT/ND; no rebound or guarding  Extremities: +Cool b/l LE, no clubbing or cyanosis; no peripheral edema  Vascular: 2+ radial b/l, unable to palpate DP/PT pulses B/L  Neurologic: AAOx3, no focal deficits  Psychiatric: calm, cooperative    MEDICATIONS:  MEDICATIONS  (STANDING):  aMIOdarone Infusion 1 mG/Min (33.3 mL/Hr) IV Continuous <Continuous>  aMIOdarone Infusion 0.5 mG/Min (16.7 mL/Hr) IV Continuous <Continuous>  aspirin  chewable 81 milliGRAM(s) Oral daily  atorvastatin 40 milliGRAM(s) Oral at bedtime  chlorhexidine 2% Cloths 1 Application(s) Topical <User Schedule>  heparin  Infusion. 1100 Unit(s)/Hr (11 mL/Hr) IV Continuous <Continuous>  influenza  Vaccine (HIGH DOSE) 0.7 milliLiter(s) IntraMuscular once  multivitamin 1 Tablet(s) Oral daily  nitroprusside Infusion 0.5 MICROgram(s)/kG/Min (4.83 mL/Hr) IV Continuous <Continuous>    MEDICATIONS  (PRN):      ALLERGIES:  Allergies    No Known Allergies    Intolerances      LABS:                        13.9   11.24 )-----------( 206      ( 08 Dec 2022 05:30 )             41.7     12-08    137  |  104  |  20  ----------------------------<  111<H>  4.0   |  21<L>  |  0.90    Ca    8.5      08 Dec 2022 12:26  Phos  2.1     12-08  Mg     2.4     12-08    TPro  6.5  /  Alb  3.2<L>  /  TBili  0.9  /  DBili  x   /  AST  47<H>  /  ALT  23  /  AlkPhos  94  12-08    PT/INR - ( 08 Dec 2022 05:30 )   PT: 16.7 sec;   INR: 1.40        PTT - ( 08 Dec 2022 05:30 )  PTT:60.7 sec      RADIOLOGY & ADDITIONAL TESTS: Reviewed.

## 2022-12-08 NOTE — DIETITIAN INITIAL EVALUATION ADULT - ADD RECOMMEND
1. Monitor PO intake/appetite, GI distress, diet tolerance, labs, weights.  2. Honor pt food preferences as able.  >> Consider adding soft and Bite sized to diet order PRN if pt agreeable.   3. MVI.  4. Should pt be noted with s/s of issues swallowing, recommend NPO/SLP.  5. RD to remain available for additional nutrition interventions as needed.

## 2022-12-08 NOTE — DIETITIAN INITIAL EVALUATION ADULT - PERTINENT LABORATORY DATA
12-08    137  |  104  |  20  ----------------------------<  111<H>  4.0   |  21<L>  |  0.90    Ca    8.5      08 Dec 2022 12:26  Phos  2.1     12-08  Mg     2.4     12-08    TPro  6.5  /  Alb  3.2<L>  /  TBili  0.9  /  DBili  x   /  AST  47<H>  /  ALT  23  /  AlkPhos  94  12-08

## 2022-12-08 NOTE — CONSULT NOTE ADULT - ASSESSMENT
77 year old male with no documented past medical history presenting from [facility] after being found unresponsive at his nursing home s/p narcan with resolution. Found to have atrial flutter. Echo showed severely reduced LV function with an LV thrombus. Heparin gtt was started and pt transferred to [facility] for LORENZO and possible ablation. Vascular consulted for cold leg. Bilateral Dupplex (12/8) showed acute thrombus completely occluding the left popliteal artery and acute thrombus incompletely occluding the left dorsalis pedis artery and likely acute thrombus completely occluding the left mid superficial femoral artery and extending to the left popliteal artery. Left DP with absent signals.    Plan:   - NPO past MN  - Increased hep gtt to goal of PTT    - Possible OR friday for left lower extremity angiogram if left leg pain doesn't improve  - Vascular will continue to follow. Please call [phone] with any questions or concerns.

## 2022-12-08 NOTE — PRE-ANESTHESIA EVALUATION ADULT - NSRADCARDRESULTSFT_GEN_ALL_CORE
TTE:   1. Limited study obtained for evaluation of left ventricular function.   2. Severely reduced left ventricular systolic function, ejection   fraction    10-15% .   3. There is a 2.5 x 2.3 cm mobile echodensity in the left ventricular   apex, consistent with a thrombus.   4. Normal right ventricular size.   5. Severely reduced right ventricular systolic function.   6. Left pleural effusion.   7. Trivial pericardial effusion without echocardiographic evidence of   cardiac tamponade physiology.

## 2022-12-08 NOTE — PRE-ANESTHESIA EVALUATION ADULT - NSANTHPMHFT_GEN_ALL_CORE
78 y/o male with no significant past medical history BIBA from [facility] [place] due to unresponsiveness. Patient was reportedly found unresponsive at his nursing home, Narcan was given in the field with resolution, and patient was taken to [facility]. Pt was persistently tachycardic in 130-140s despite receiving Adenosine 6mg IV and then 12mg IV, Lopressor 2.5mg x2, PO Metoprolol tartrate 100mg. Utox was positive for opioids. CTPE suggestive of subsegmental PE in the right upper lobe. Echo showed severely reduced LV function, EF 10%, with an LV thrombus. Heparin gtt was started, cardiology and EP were consulted for atrial flutter.  For LORENZO/cardioversion.

## 2022-12-08 NOTE — DIETITIAN INITIAL EVALUATION ADULT - OTHER INFO
78 y/o male with no significant past medical history BIBA from [facility] due to unresponsiveness, s/p Narcan given in the field with resolution. Pt was taken to [facility]. Pt was persistently tachycardic in 130-140s despite receiving Adenosine 6mg IV and then 12mg IV, Lopressor 2.5mg x2, PO Metoprolol tartrate 100mg. Utox was positive for opioids. CTPA suggestive of subsegmental PE in the right upper lobe. Echo showed severely reduced LV function with an LV thrombus. Heparin gtt was started. Cardiology and EP were consulted due to atrial flutter. EP suggested transfer to [facility] for LORENZO and possible ablation. Pt Currently evidence of early cardiogenic shock with mild GOLDIE.     Pt seen this afternoon on 5EAST s/p Return to unit from LORENZO. Pt had been NPO for LORENZO. RN reports upon return to unit passing bedside dys screen and since ordered for DASH TLC diet. Pt noted without teeth, RN feels pt to benefit from softer diet to aide in chewing. Pt admitted from Nursing Home however transfer sheets not noted to review diet PTA. Per pt eating beef/chicken/shrimp and vegetables from chinese take out. Reports having 1-2 meals/day + ONS. Per pt claims to have 2 ensure/day and 2 nutrament/day - unclear how accurate this is. ?If pt meeting ~75% EER consistently. Does report wt loss.  pounds x6 months ago. Thinks he now is 135 pounds which is consistent with bedscale wt obtained today by  pounds. Suggest wt loss of 49 pounds/26% body wt loss. 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 Malnutrition note placed today. Na K WDL, Phos Low. , /85. +L leg pain. Buddy 16. No edema, No pressure ulcers. BM+12/7.   Please see below for nutritions recommendations. Recs made with team.

## 2022-12-08 NOTE — PROGRESS NOTE ADULT - ATTENDING COMMENTS
77M w/ h/o opiate abuse, found to be unresponsive at nursing home, responded to narcan  -> found to have persistent AFlut w/ RVR, brought to be [facility] where he was also found to have new acute sCHF, now tx'd to [facility] CCU for further mgmt    -sCHF - acute systolic CHF - possibly Tachy-induced CM, has not had formal ischemic w/u; TTE: LVEF 10-15%, Large LV thrombus, Severely reduced RV function, normal RV size; Currently evidence of early cardiogenic shock w/ mild GOLDIE, Lactate mildly elevated, cool extremities, otherwise euvolemic/dry on exam - c/w Nipride gtt and titrate to goal MAP 70-75; Lactate has now cleared w/ downtrending Cr; Plan for rhythm control strategy tmrw - s/p LORENZO - w/ no LA/ISIAH thrombus - Started Amio gtt and will consider DCCV tmrw; Risk/benefit were clearly explained to patient with EP at bedside; Plan to initiate GDMT and repeat TTE post DCCV  -EP - Atrial Flutter w/ RVR ~120s, currently HD stable; c/w Heparin gtt; EP consulted - s/p LORENZO - No LA/ISIAH thrombus - Started Amiodarone gtt today will consider DCCV tmrw; Risk of Stroke explained to patient. However, if pt. remains in Aflutter he will continue to decompensate - which he understands  -Pulm - Found to have PE on CTA chest, currently non-hypoxic and HD stable despite Aflutter; c/w Heparin gtt - will transition to Oral AC post DCCV  -DASH diet, panding dysphagia screening  -DVT PPx  -Dispo: CCU    Dago Mares MD  CCU Attending

## 2022-12-08 NOTE — DIETITIAN INITIAL EVALUATION ADULT - OTHER CALCULATIONS
6'6''  pounds +-10%  Wt 142 pounds BMI 16.4 %IBW=66%   Pt appears to be tall however ? accuracy of ht   current body wt for EER based on clinician judgment   adjust for age, malnutrition, EF; fluids per team

## 2022-12-09 NOTE — PROGRESS NOTE ADULT - ASSESSMENT
78 y/o M with no significant PMHx found unresponsive at his nursing home, resolved after Narcan in the field, and brought to [facility]. Found to have PE, atrial flutter, and large LV thrombus on echo. Transferred to [facility] for further management.    NEUROLOGY  #Opioid abuse  Urine tox positive for opioids. Admitted to snorting cocaine and heroin for many years. Denies being an IVDU. Does not use methadone.  - Consider giving methadone 10mg with signs of withdrawal  - Will call psych consult if patient withdrawing    CARDIOVASCULAR  #Atrial flutter  Found to have atrial flutter at [facility]. Transferred to [facility] for possible ablation. HR persistently 130 since arrival to [facility], showing atrial flutter on tele. EKG confirming atrial flutter with . EP consulted for possible ablation vs cardioversion, however patient deemed too high risk. Will proceed with medical management. S/p Digoxin 1g load.   - Start Amiodarone 150mg IVP, then 1mg gtt x6 hours, then 0.5mg gtt x18 hours (to begin 10g load)  - May give Digoxin 0.25mg IVP every other day PRN  - Continue tele monitoring  - Daily EKG    #Acute CHF  No prior medical hx of HF. BNP 15k and echo with LV thrombus at [facility]. 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 Currently euvolemic. TTE 12/7: severely reduced LV systolic function with EF 10-15%, severely reduced RV systolic function, left pleural effusion, trivial pericardial effusion.   - C/w Nipride 0.5 gtt  - Hold beta blocker for now  - Goal MAP >70-75    #Acute embolic limb ischemia  Patient with cold LE extremities. Absent b/l LE pulses on dopplers. Arterial duplex of b/l LE showing acute thrombus occluding left popliteal artery, acute thrombus completely occluding the left mid superficial femoral artery and extending to the left popliteal artery.   - Vascular consulted emergently  - Patient high risk for emergent high risk procedure, however no specific contraindications and no further cardiac workup indicated. Would continue current regimen of Amio gtt.     #LV thrombus  Echo showing 2.5 x 2.3 cm mobile echodensity in the left ventricular apex, consistent with a thrombus.  - No contraindications to vascular procedure    PULMONARY  #Pulmonary embolism  CTA at [facility] showing evidence of subsegmental PE of right upper lobe. Patient was started on Heparin gtt, then transferred to [facility]. No tachypnea, however patient saturating at 90-94% on room air. Will continue with full anticoagulation.  - Patient refusing nasal cannula  - C/w Heparin gtt  - Goal PTT 59-99    GASTROINTESTINAL  #?Hep C infection  Unknown prior hx. Denies IVDU. Positive for Hep C screening.  - F/u Hep C RNA     RENAL  ELEAZAR    INFECTIOUS DISEASE  ELEAZAR    ENDOCRINE  ELEAZAR    HEME  ELEAZAR    PREVENTION:  F: none  E: replete PRN  N: DASH diet with Ensure x3  DVT ppx: heparin gtt  Dispo: CCU  Code: FULL 78 y/o M with no significant PMHx found unresponsive at his nursing home, resolved after Narcan in the field, and brought to [facility]. Found to have PE, atrial flutter, and large LV thrombus on echo. Transferred to [facility] for further management.    NEUROLOGY  #Opioid abuse  Urine tox positive for opioids. Admitted to snorting cocaine and heroin for many years. Denies being an IVDU. Does not use methadone.  - Consider giving methadone 10mg with signs of withdrawal  - Will call psych consult if patient withdrawing    CARDIOVASCULAR  #Atrial flutter  Found to have atrial flutter at [facility]. Transferred to [facility] for possible ablation. HR persistently 130 since arrival to [facility], showing atrial flutter on tele. EP consulted for possible ablation vs cardioversion, however patient deemed too high risk. Will proceed with medical management. S/p Digoxin 1g load. EKG today showing atrial flutter with HR 96.  - Continue Amiodarone 0.5mg gtt x18 hours  - May give Digoxin 0.25mg IVP every other day PRN  - Continue tele monitoring  - Daily EKG    #Acute CHF  No prior medical hx of HF. BNP 15k and echo with LV thrombus at [facility]. 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 Currently euvolemic. TTE 12/7: severely reduced LV systolic function with EF 10-15%, severely reduced RV systolic function, left pleural effusion, trivial pericardial effusion.   - C/w Nipride 1.5 gtt for afterload reduction  - Hold beta blocker for now  - Goal MAP >70-75    #Acute embolic limb ischemia  Patient with cold LE extremities. Absent b/l LE pulses on dopplers. Arterial duplex of b/l LE showing acute thrombus occluding left popliteal artery, acute thrombus completely occluding the left mid superficial femoral artery and extending to the left popliteal artery.   - Vascular consulted emergently  - Patient high risk for emergent high risk procedure, however no specific contraindications and no further cardiac workup indicated. Would continue current regimen of Amio gtt.     #LV thrombus  Echo showing 2.5 x 2.3 cm mobile echodensity in the left ventricular apex, consistent with a thrombus.  - No contraindications to vascular procedure    PULMONARY  #Pulmonary embolism  CTA at [facility] showing evidence of subsegmental PE of right upper lobe. Patient was started on Heparin gtt, then transferred to [facility]. No tachypnea, patient saturating at % on room air. Will continue with full anticoagulation.  - Patient refusing nasal cannula  - C/w Heparin gtt  - Goal PTT  per vascular     GASTROINTESTINAL  #?Hep C infection  Unknown prior hx. Denies IVDU. Positive for Hep C screening.  - F/u Hep C RNA     RENAL  ELEAZAR    INFECTIOUS DISEASE  ELEAZAR    ENDOCRINE  ELEAZAR    HEME  ELEAZRA    PREVENTION:  F: none  E: replete PRN  N: DASH diet with Ensure x3  DVT ppx: heparin gtt  Dispo: CCU  Code: FULL

## 2022-12-09 NOTE — PROGRESS NOTE ADULT - SUBJECTIVE AND OBJECTIVE BOX
SUBJECTIVE: Pt seen and examined at bedside this am. Reports that his foot feels better today, the pain has improved.    MEDICATIONS  (STANDING):  aMIOdarone Infusion 1 mG/Min (33.3 mL/Hr) IV Continuous <Continuous>  aMIOdarone Infusion 0.5 mG/Min (16.7 mL/Hr) IV Continuous <Continuous>  aspirin enteric coated 81 milliGRAM(s) Oral daily  atorvastatin 40 milliGRAM(s) Oral at bedtime  chlorhexidine 2% Cloths 1 Application(s) Topical <User Schedule>  heparin  Infusion. 1400 Unit(s)/Hr (14 mL/Hr) IV Continuous <Continuous>  influenza  Vaccine (HIGH DOSE) 0.7 milliLiter(s) IntraMuscular once  multivitamin 1 Tablet(s) Oral daily  nitroprusside Infusion 0.5 MICROgram(s)/kG/Min (4.83 mL/Hr) IV Continuous <Continuous>    MEDICATIONS  (PRN):      Vital Signs Last 24 Hrs  T(C): 36.6 (09 Dec 2022 05:38), Max: 37.5 (08 Dec 2022 22:01)  T(F): 97.8 (09 Dec 2022 05:38), Max: 99.5 (08 Dec 2022 22:01)  HR: 105 (09 Dec 2022 08:00) (92 - 131)  BP: 111/63 (08 Dec 2022 09:54) (111/63 - 111/63)  BP(mean): 82 (08 Dec 2022 09:54) (82 - 82)  RR: 17 (09 Dec 2022 08:00) (14 - 44)  SpO2: 93% (09 Dec 2022 08:00) (90% - 100%)    Parameters below as of 09 Dec 2022 08:00  Patient On (Oxygen Delivery Method): room air        Physical Exam  General: NAD, resting comfortably in bed  Pulmonary: Nonlabored breathing, no respiratory distress  CV: NSR  Abd: soft, NT/ND,   Extremities: left foot cool to touch, left pop monophasic, PT/DP no signals      I&O's Detail    08 Dec 2022 07:01  -  09 Dec 2022 07:00  --------------------------------------------------------  IN:    Amiodarone: 199.8 mL    Amiodarone: 217.1 mL    Heparin Infusion: 169 mL    Heparin Infusion: 110 mL    Nitroprusside: 14.5 mL    Nitroprusside: 135.3 mL  Total IN: 845.7 mL    OUT:    Voided (mL): 1975 mL  Total OUT: 1975 mL    Total NET: -1129.3 mL          LABS:                        14.3   11.55 )-----------( 209      ( 09 Dec 2022 05:42 )             42.9     12-09    136  |  105  |  14  ----------------------------<  114<H>  4.0   |  25  |  0.85    Ca    8.3<L>      09 Dec 2022 05:42  Phos  2.6     12-09  Mg     2.3     12-09    TPro  6.2  /  Alb  3.0<L>  /  TBili  0.9  /  DBili  x   /  AST  38  /  ALT  24  /  AlkPhos  83  12-09    PT/INR - ( 09 Dec 2022 05:42 )   PT: 16.0 sec;   INR: 1.34          PTT - ( 09 Dec 2022 05:42 )  PTT:77.1 sec      RADIOLOGY & ADDITIONAL STUDIES:

## 2022-12-09 NOTE — PROGRESS NOTE ADULT - ASSESSMENT
77 year old male with no documented past medical history presenting from [facility] after being found unresponsive at his nursing home s/p narcan with resolution. Found to have atrial flutter. Echo showed severely reduced LV function with an LV thrombus. Heparin gtt was started and pt transferred to [facility] for LORENZO and possible ablation. Vascular consulted for cold leg. Bilateral Dupplex (12/8) showed acute thrombus completely occluding the left popliteal artery and acute thrombus incompletely occluding the left dorsalis pedis artery and likely acute thrombus completely occluding the left mid superficial femoral artery and extending to the left popliteal artery. Left DP with absent signals.    Plan:   - OR today for left lower extremity angiogram, possible angioplasty, possible thrombolytics/thrombectomy  - NPO for OR  - Increased hep gtt to goal of PTT    - Please call [phone] with any questions or concerns.    77 year old male with no documented past medical history presenting from [facility] after being found unresponsive at his nursing home s/p narcan with resolution. Found to have atrial flutter. Echo showed severely reduced LV function with an LV thrombus. Heparin gtt was started and pt transferred to [facility] for LORENZO and possible ablation. Vascular consulted for cold leg. Bilateral Dupplex (12/8) showed acute thrombus completely occluding the left popliteal artery and acute thrombus incompletely occluding the left dorsalis pedis artery and likely acute thrombus completely occluding the left mid superficial femoral artery and extending to the left popliteal artery. Left DP with absent signals.    Plan:   - OR today for left lower extremity angiogram, possible angioplasty, possible thrombolysis/thrombectomy  - NPO for OR  - Increased hep gtt to goal of PTT    - Please call [phone] with any questions or concerns.

## 2022-12-09 NOTE — PROGRESS NOTE ADULT - SUBJECTIVE AND OBJECTIVE BOX
CC: Patient is a 77y old  Male who presents with A flutter.    INTERVAL EVENTS: MAYO    SUBJECTIVE / INTERVAL HPI: Patient seen and examined at bedside.     ROS: negative unless otherwise stated above.    VITAL SIGNS:  Vital Signs Last 24 Hrs  T(C): 36.6 (09 Dec 2022 05:38), Max: 37.5 (08 Dec 2022 22:01)  T(F): 97.8 (09 Dec 2022 05:38), Max: 99.5 (08 Dec 2022 22:01)  HR: 106 (09 Dec 2022 05:00) (98 - 131)  BP: 111/63 (08 Dec 2022 09:54) (111/63 - 111/63)  BP(mean): 82 (08 Dec 2022 09:54) (82 - 82)  RR: 24 (09 Dec 2022 05:00) (17 - 44)  SpO2: 99% (09 Dec 2022 05:00) (90% - 100%)    Parameters below as of 09 Dec 2022 05:00  Patient On (Oxygen Delivery Method): room air      12-08-22 @ 07:01  -  12-09-22 @ 07:00  --------------------------------------------------------  IN: 845.7 mL / OUT: 1975 mL / NET: -1129.3 mL      PHYSICAL EXAM:  Constitutional: resting comfortably; NAD  HEENT: NC/AT, PERRL, EOMI, anicteric sclera, MMM  Neck: supple; no JVD or thyromegaly  Respiratory: CTA B/L; no W/R/R, no retractions  Cardiac: +S1/S2; RRR; no M/R/G  Gastrointestinal: soft, NT/ND; no rebound or guarding; +BSx4  Extremities: WWP, no clubbing or cyanosis; no peripheral edema  Musculoskeletal: NROM x4; no joint swelling, tenderness or erythema  Vascular: 2+ radial, DP/PT pulses B/L  Dermatologic: skin warm, dry and intact; no rashes, wounds, or scars  Neurologic: AAOx3, no focal deficits  Psychiatric: calm, cooperative, behaviors are appropriate, denies SI/HI    MEDICATIONS:  MEDICATIONS  (STANDING):  aMIOdarone Infusion 1 mG/Min (33.3 mL/Hr) IV Continuous <Continuous>  aMIOdarone Infusion 0.5 mG/Min (16.7 mL/Hr) IV Continuous <Continuous>  aspirin enteric coated 81 milliGRAM(s) Oral daily  atorvastatin 40 milliGRAM(s) Oral at bedtime  chlorhexidine 2% Cloths 1 Application(s) Topical <User Schedule>  heparin  Infusion. 1400 Unit(s)/Hr (14 mL/Hr) IV Continuous <Continuous>  influenza  Vaccine (HIGH DOSE) 0.7 milliLiter(s) IntraMuscular once  multivitamin 1 Tablet(s) Oral daily  nitroprusside Infusion 0.5 MICROgram(s)/kG/Min (4.83 mL/Hr) IV Continuous <Continuous>    MEDICATIONS  (PRN):      ALLERGIES:  Allergies    No Known Allergies    Intolerances      LABS:                        14.3   11.55 )-----------( 209      ( 09 Dec 2022 05:42 )             42.9     12-09    136  |  105  |  14  ----------------------------<  114<H>  4.0   |  25  |  0.85    Ca    8.3<L>      09 Dec 2022 05:42  Phos  2.6     12-09  Mg     2.3     12-09    TPro  6.2  /  Alb  3.0<L>  /  TBili  0.9  /  DBili  x   /  AST  38  /  ALT  24  /  AlkPhos  83  12-09    PT/INR - ( 09 Dec 2022 05:42 )   PT: 16.0 sec;   INR: 1.34       PTT - ( 09 Dec 2022 05:42 )  PTT:77.1 sec        RADIOLOGY & ADDITIONAL TESTS: Reviewed.   CC: Patient is a 77y old  Male who presents with A flutter.    INTERVAL EVENTS: MAYO    SUBJECTIVE / INTERVAL HPI: Patient seen and examined at bedside. Rested comfortably overnight. No complaints at this time. Admits to some pain in the left foot. Denies fevers, chest pain, SOB, N/V.     ROS: negative unless otherwise stated above.    VITAL SIGNS:  Vital Signs Last 24 Hrs  T(C): 36.6 (09 Dec 2022 05:38), Max: 37.5 (08 Dec 2022 22:01)  T(F): 97.8 (09 Dec 2022 05:38), Max: 99.5 (08 Dec 2022 22:01)  HR: 106 (09 Dec 2022 05:00) (98 - 131)  BP: 111/63 (08 Dec 2022 09:54) (111/63 - 111/63)  BP(mean): 82 (08 Dec 2022 09:54) (82 - 82)  RR: 24 (09 Dec 2022 05:00) (17 - 44)  SpO2: 99% (09 Dec 2022 05:00) (90% - 100%)    Parameters below as of 09 Dec 2022 05:00  Patient On (Oxygen Delivery Method): room air      12-08-22 @ 07:01  -  12-09-22 @ 07:00  --------------------------------------------------------  IN: 845.7 mL / OUT: 1975 mL / NET: -1129.3 mL      PHYSICAL EXAM:  Constitutional: elderly male lying flat, resting comfortably; NAD  HEENT: NC/AT, anicteric sclera, MMM  Neck: supple; no JVD  Respiratory: CTA B/L; no W/R/R, no retractions  Cardiac: +distant heart sounds, +tachycardic  Gastrointestinal: soft, NT/ND; no rebound or guarding  Extremities: +Cold b/l LE L>R, no clubbing or cyanosis; no peripheral edema  Vascular: 2+ radial b/l, unable to palpate DP/PT pulses B/L  Neurologic: AAOx3, no focal deficits  Psychiatric: calm, cooperative    MEDICATIONS:  MEDICATIONS  (STANDING):  aMIOdarone Infusion 1 mG/Min (33.3 mL/Hr) IV Continuous <Continuous>  aMIOdarone Infusion 0.5 mG/Min (16.7 mL/Hr) IV Continuous <Continuous>  aspirin enteric coated 81 milliGRAM(s) Oral daily  atorvastatin 40 milliGRAM(s) Oral at bedtime  chlorhexidine 2% Cloths 1 Application(s) Topical <User Schedule>  heparin  Infusion. 1400 Unit(s)/Hr (14 mL/Hr) IV Continuous <Continuous>  influenza  Vaccine (HIGH DOSE) 0.7 milliLiter(s) IntraMuscular once  multivitamin 1 Tablet(s) Oral daily  nitroprusside Infusion 0.5 MICROgram(s)/kG/Min (4.83 mL/Hr) IV Continuous <Continuous>    MEDICATIONS  (PRN):      ALLERGIES:  Allergies    No Known Allergies    Intolerances      LABS:                        14.3   11.55 )-----------( 209      ( 09 Dec 2022 05:42 )             42.9     12-09    136  |  105  |  14  ----------------------------<  114<H>  4.0   |  25  |  0.85    Ca    8.3<L>      09 Dec 2022 05:42  Phos  2.6     12-09  Mg     2.3     12-09    TPro  6.2  /  Alb  3.0<L>  /  TBili  0.9  /  DBili  x   /  AST  38  /  ALT  24  /  AlkPhos  83  12-09    PT/INR - ( 09 Dec 2022 05:42 )   PT: 16.0 sec;   INR: 1.34       PTT - ( 09 Dec 2022 05:42 )  PTT:77.1 sec        RADIOLOGY & ADDITIONAL TESTS: Reviewed.

## 2022-12-09 NOTE — PROGRESS NOTE ADULT - ATTENDING COMMENTS
77M w/ h/o opiate abuse, found to be unresponsive at nursing home, responded to narcan  -> found to have persistent AFlut w/ RVR, brought to be [facility] where he was also found to have new acute sCHF, now tx'd to [facility] CCU for further mgmt    -sCHF - acute systolic CHF - possibly Tachy-induced CM, has not had formal ischemic w/u; TTE: LVEF 10-15%, Large LV thrombus, Severely reduced RV function, normal RV size; Currently evidence of early cardiogenic shock w/ mild GOLDIE, Lactate mildly elevated, cool extremities, otherwise euvolemic/dry on exam - c/w Nipride gtt and titrate to goal MAP 70-75; Lactate has cleared w/ downtrending Cr; Plan for rhythm control strategy tmrw - s/p LORENZO - w/ no LA/ISIAH thrombus - c/w Amio gtt - rates better controlled but remains in Aflutter w/ variable AVB  -EP - Atrial Flutter w/ RVR ~110s, currently HD stable; c/w Heparin gtt; EP consulted - s/p LORENZO - No LA/ISIAH thrombus - c/w Amiodarone gtt today may consider DCCV but postponed for now given acute b/l lower extremity arterial occlusions  -Vascular - Pt. w/ cool extremities L>R but not currently in low output state, s/p b/l arterial dopplers - 1. There is soft, likely acute thrombus completely occluding the left popliteal artery. There is additional soft, likely acute thrombus   incompletely occluding the left dorsalis pedis artery. 2. There is soft, likely acute thrombus completely occluding the left mid   superficial femoral artery and extending to the left popliteal artery. Vascular consulted, pt. for OR today for LLE angiogram w/ angioplasty and possible thrombolysis; c/w Heparin gtt, increased PTT goal(), c/w ASA and Lipitor  -Pulm - Found to have PE on CTA chest, currently non-hypoxic and HD stable despite Aflutter; c/w Heparin gtt - will transition to Oral AC post DCCV  -NPO for OR today  -DVT PPx  -Dispo: CCU    Dago Mares MD  CCU Attending

## 2022-12-10 NOTE — PROGRESS NOTE ADULT - SUBJECTIVE AND OBJECTIVE BOX
Patient seen and examined and discussed with the CCU team on call.  I agree with the details of their documentation.  Rhythm remains AF/AFL with RVR but now it is in the setting of GI distress that is believed to be due to opioid withdrawal.  He remains hemodynamically stable.  He is receiving AMIO and digoxin that can also cause GI discomfort.  He is tolerating A/C.  Would repeat TTE to check on status of his LV thrombus.  Continue the current rate control strategy.  Will discuss timing and method of restoration of sinus rhythm (DCCV versus Ablation of atrial flutter circuit).

## 2022-12-10 NOTE — PROGRESS NOTE ADULT - SUBJECTIVE AND OBJECTIVE BOX
CC: Patient is a 77y old  Male who presents with a chief complaint of A flutter (09 Dec 2022 08:27)    INTERVAL EVENTS: MAYO    SUBJECTIVE / INTERVAL HPI: Patient seen and examined at bedside.     ROS: negative unless otherwise stated above.    VITAL SIGNS:  Vital Signs Last 24 Hrs  T(C): 37.1 (10 Dec 2022 04:29), Max: 37.3 (09 Dec 2022 18:16)  T(F): 98.7 (10 Dec 2022 04:29), Max: 99.1 (09 Dec 2022 18:16)  HR: 136 (10 Dec 2022 08:00) (105 - 138)  BP: --  BP(mean): --  RR: 28 (10 Dec 2022 08:00) (11 - 49)  SpO2: 96% (10 Dec 2022 08:00) (90% - 97%)    Parameters below as of 10 Dec 2022 08:00  Patient On (Oxygen Delivery Method): room air      12-09-22 @ 07:01  -  12-10-22 @ 07:00  --------------------------------------------------------  IN: 1432.3 mL / OUT: 1320 mL / NET: 112.3 mL    12-10-22 @ 07:01  -  12-10-22 @ 08:07  --------------------------------------------------------  IN: 33.3 mL / OUT: 0 mL / NET: 33.3 mL      PHYSICAL EXAM:  Constitutional: resting comfortably; NAD  HEENT: NC/AT, PERRL, EOMI, anicteric sclera, MMM  Neck: supple; no JVD or thyromegaly  Respiratory: CTA B/L; no W/R/R, no retractions  Cardiac: +S1/S2; RRR; no M/R/G  Gastrointestinal: soft, NT/ND; no rebound or guarding; +BSx4  Extremities: WWP, no clubbing or cyanosis; no peripheral edema  Musculoskeletal: NROM x4; no joint swelling, tenderness or erythema  Vascular: 2+ radial, DP/PT pulses B/L  Dermatologic: skin warm, dry and intact; no rashes, wounds, or scars  Neurologic: AAOx3, no focal deficits  Psychiatric: calm, cooperative, behaviors are appropriate, denies SI/HI    MEDICATIONS:  MEDICATIONS  (STANDING):  aMIOdarone    Tablet 400 milliGRAM(s) Oral every 8 hours  aspirin enteric coated 81 milliGRAM(s) Oral daily  atorvastatin 40 milliGRAM(s) Oral at bedtime  chlorhexidine 2% Cloths 1 Application(s) Topical <User Schedule>  heparin  Infusion. 1400 Unit(s)/Hr (14 mL/Hr) IV Continuous <Continuous>  influenza  Vaccine (HIGH DOSE) 0.7 milliLiter(s) IntraMuscular once  multivitamin 1 Tablet(s) Oral daily  nitroprusside Infusion 0.5 MICROgram(s)/kG/Min (4.83 mL/Hr) IV Continuous <Continuous>  potassium chloride    Tablet ER 20 milliEquivalent(s) Oral every 2 hours  potassium chloride  10 mEq/100 mL IVPB 10 milliEquivalent(s) IV Intermittent every 1 hour    MEDICATIONS  (PRN):  aluminum hydroxide/magnesium hydroxide/simethicone Suspension 30 milliLiter(s) Oral every 4 hours PRN Dyspepsia      ALLERGIES:  Allergies    No Known Allergies    Intolerances        LABS:                        13.2   12.59 )-----------( 230      ( 10 Dec 2022 05:58 )             40.0     12-10    136  |  104  |  11  ----------------------------<  139<H>  3.3<L>   |  21<L>  |  0.71    Ca    8.0<L>      10 Dec 2022 05:58  Phos  2.5     12-10  Mg     2.0     12-10    TPro  6.6  /  Alb  3.3  /  TBili  0.9  /  DBili  x   /  AST  46<H>  /  ALT  25  /  AlkPhos  80  12-10    PT/INR - ( 10 Dec 2022 05:58 )   PT: 16.0 sec;   INR: 1.34       PTT - ( 10 Dec 2022 05:58 )  PTT:90.1 sec      RADIOLOGY & ADDITIONAL TESTS: Reviewed.   CC: Patient is a 77y old  Male who presents with a chief complaint of A flutter.    INTERVAL EVENTS: Overnight, patient had 3 episodes of vomiting and multiple bouts of diarrhea. Abdominal exam unremarkable. Zofran x1 given plus Tigan x2 and Pepcid 20mg IV x2. MAPs >100 so Nipride gtt increased from 1.5 to 2.0 with improvement. GI PCR negative.     SUBJECTIVE / INTERVAL HPI: Patient seen and examined at bedside. C/o nausea and generalized weakness. States he does not feel well. Denies fevers, chills, chest pain, SOB, cough. Vomiting and diarrhea subsiding now, he can tolerate drinking water now.    ROS: negative unless otherwise stated above.    VITAL SIGNS:  Vital Signs Last 24 Hrs  T(C): 37.1 (10 Dec 2022 04:29), Max: 37.3 (09 Dec 2022 18:16)  T(F): 98.7 (10 Dec 2022 04:29), Max: 99.1 (09 Dec 2022 18:16)  HR: 136 (10 Dec 2022 08:00) (105 - 138)  BP: 178/113 (108-178/)  BP(mean): 113 ()  RR: 28 (10 Dec 2022 08:00) (11 - 49)  SpO2: 96% (10 Dec 2022 08:00) (90% - 97%)    Parameters below as of 10 Dec 2022 08:00  Patient On (Oxygen Delivery Method): room air      12-09-22 @ 07:01  -  12-10-22 @ 07:00  --------------------------------------------------------  IN: 1432.3 mL / OUT: 1320 mL / NET: 112.3 mL    12-10-22 @ 07:01  -  12-10-22 @ 08:07  --------------------------------------------------------  IN: 33.3 mL / OUT: 0 mL / NET: 33.3 mL      PHYSICAL EXAM:  Constitutional: elderly male lying flat, slightly restless, NAD  HEENT: NC/AT, anicteric sclera, MMM  Neck: supple; no JVD  Respiratory: CTA B/L; no W/R/R, no retractions  Cardiac: +distant heart sounds, +tachycardic  Gastrointestinal: soft, NT/ND; no rebound or guarding  Extremities: +Cold LLE (improving), no clubbing or cyanosis; no peripheral edema  Vascular: 2+ radial b/l, unable to palpate DP/PT pulses B/L  Neurologic: AAOx3, no focal deficits    MEDICATIONS:  MEDICATIONS  (STANDING):  aMIOdarone    Tablet 400 milliGRAM(s) Oral every 8 hours  aspirin enteric coated 81 milliGRAM(s) Oral daily  atorvastatin 40 milliGRAM(s) Oral at bedtime  chlorhexidine 2% Cloths 1 Application(s) Topical <User Schedule>  heparin  Infusion. 1400 Unit(s)/Hr (14 mL/Hr) IV Continuous <Continuous>  influenza  Vaccine (HIGH DOSE) 0.7 milliLiter(s) IntraMuscular once  multivitamin 1 Tablet(s) Oral daily  nitroprusside Infusion 0.5 MICROgram(s)/kG/Min (4.83 mL/Hr) IV Continuous <Continuous>  potassium chloride    Tablet ER 20 milliEquivalent(s) Oral every 2 hours  potassium chloride  10 mEq/100 mL IVPB 10 milliEquivalent(s) IV Intermittent every 1 hour    MEDICATIONS  (PRN):  aluminum hydroxide/magnesium hydroxide/simethicone Suspension 30 milliLiter(s) Oral every 4 hours PRN Dyspepsia      ALLERGIES:  Allergies    No Known Allergies    Intolerances        LABS:                        13.2   12.59 )-----------( 230      ( 10 Dec 2022 05:58 )             40.0     12-10    136  |  104  |  11  ----------------------------<  139<H>  3.3<L>   |  21<L>  |  0.71    Ca    8.0<L>      10 Dec 2022 05:58  Phos  2.5     12-10  Mg     2.0     12-10    TPro  6.6  /  Alb  3.3  /  TBili  0.9  /  DBili  x   /  AST  46<H>  /  ALT  25  /  AlkPhos  80  12-10    PT/INR - ( 10 Dec 2022 05:58 )   PT: 16.0 sec;   INR: 1.34       PTT - ( 10 Dec 2022 05:58 )  PTT:90.1 sec      RADIOLOGY & ADDITIONAL TESTS: Reviewed.

## 2022-12-10 NOTE — PROGRESS NOTE ADULT - ASSESSMENT
78 y/o M with no significant PMHx found unresponsive at his nursing home, resolved after Narcan in the field, and brought to [facility]. Found to have PE, atrial flutter, and large LV thrombus on echo. Transferred to [facility] for further management.    NEUROLOGY  #Opioid abuse  Urine tox positive for opioids. Admitted to snorting cocaine and heroin for many years. Denies being an IVDU. Does not use methadone.  - Consider giving methadone 10mg with signs of withdrawal  - Will call psych consult if patient withdrawing    CARDIOVASCULAR  #Atrial flutter  Found to have atrial flutter at [facility]. Transferred to [facility] for possible ablation. HR persistently 130 since arrival to [facility], showing atrial flutter on tele. EP consulted for possible ablation vs cardioversion, however patient deemed too high risk. Will proceed with medical management. S/p Digoxin 1g load. EKG today showing atrial flutter with HR 96.  - Continue Amiodarone 0.5mg gtt x18 hours  - May give Digoxin 0.25mg IVP every other day PRN  - Continue tele monitoring  - Daily EKG    #Acute CHF  No prior medical hx of HF. BNP 15k and echo with LV thrombus at [facility]. 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 Currently euvolemic. TTE 12/7: severely reduced LV systolic function with EF 10-15%, severely reduced RV systolic function, left pleural effusion, trivial pericardial effusion.   - C/w Nipride 1.5 gtt for afterload reduction  - Hold beta blocker for now  - Goal MAP >70-75    #Acute embolic limb ischemia  Patient with cold LE extremities. Absent b/l LE pulses on dopplers. Arterial duplex of b/l LE showing acute thrombus occluding left popliteal artery, acute thrombus completely occluding the left mid superficial femoral artery and extending to the left popliteal artery.   - Vascular consulted emergently  - Patient high risk for emergent high risk procedure, however no specific contraindications and no further cardiac workup indicated. Would continue current regimen of Amio gtt.     #LV thrombus  Echo showing 2.5 x 2.3 cm mobile echodensity in the left ventricular apex, consistent with a thrombus.  - No contraindications to vascular procedure    PULMONARY  #Pulmonary embolism  CTA at [facility] showing evidence of subsegmental PE of right upper lobe. Patient was started on Heparin gtt, then transferred to [facility]. No tachypnea, patient saturating at % on room air. Will continue with full anticoagulation.  - Patient refusing nasal cannula  - C/w Heparin gtt  - Goal PTT  per vascular     GASTROINTESTINAL  #?Hep C infection  Unknown prior hx. Denies IVDU. Positive for Hep C screening.  - F/u Hep C RNA     RENAL  ELEAZAR    INFECTIOUS DISEASE  ELEAZAR    ENDOCRINE  ELEAZAR    HEME  ELEAZAR    PREVENTION:  F: none  E: replete PRN  N: DASH diet with Ensure x3  DVT ppx: heparin gtt  Dispo: CCU  Code: FULL 78 y/o M with no significant PMHx found unresponsive at his nursing home, resolved after Narcan in the field, and brought to [facility]. Found to have PE, atrial flutter, and large LV thrombus on echo. Transferred to [facility] for further management.    NEUROLOGY  #Opioid abuse  Urine tox positive for opioids. Admitted to snorting cocaine and heroin for many years. Denies being an IVDU. Does not use methadone.  - Consider giving methadone 10mg with signs of withdrawal  - Will call psych consult if patient withdrawing    CARDIOVASCULAR  #Atrial flutter  Found to have atrial flutter at [facility]. Transferred to [facility] for possible ablation. HR persistently 130 since arrival to [facility], showing atrial flutter on tele. EP consulted for possible ablation vs cardioversion, however patient deemed too high risk. Will proceed with medical management. S/p Digoxin 1g load. EKG today showing atrial flutter with HR 96.  - Continue Amiodarone 0.5mg gtt x18 hours  - May give Digoxin 0.25mg IVP every other day PRN  - Continue tele monitoring  - Daily EKG    #Acute CHF  No prior medical hx of HF. BNP 15k and echo with LV thrombus at [facility]. 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 Currently euvolemic. TTE 12/7: severely reduced LV systolic function with EF 10-15%, severely reduced RV systolic function, left pleural effusion, trivial pericardial effusion.   - C/w Nipride 1.5 gtt for afterload reduction  - Hold beta blocker for now  - Goal MAP >70-75    #Acute embolic limb ischemia  Patient with cold LE extremities. Absent b/l LE pulses on dopplers. Arterial duplex of b/l LE showing acute thrombus occluding left popliteal artery, acute thrombus completely occluding the left mid superficial femoral artery and extending to the left popliteal artery.   - Vascular consulted emergently  - Patient high risk for emergent high risk procedure, however no specific contraindications and no further cardiac workup indicated. Would continue current regimen of Amio gtt.     #LV thrombus  Echo showing 2.5 x 2.3 cm mobile echodensity in the left ventricular apex, consistent with a thrombus.  - Heparin gtt  - No contraindications to vascular procedure    PULMONARY  #Pulmonary embolism  CTA at [facility] showing evidence of subsegmental PE of right upper lobe. Patient was started on Heparin gtt, then transferred to [facility]. No tachypnea, patient saturating at % on room air. Will continue with full anticoagulation.  - Patient refusing nasal cannula  - C/w Heparin gtt  - Goal PTT  per vascular     GASTROINTESTINAL  #?Hep C infection  Unknown prior hx. Denies IVDU. Positive for Hep C screening.  - F/u Hep C RNA     RENAL  ELEAZAR    INFECTIOUS DISEASE  ELEAZAR    ENDOCRINE  ELEAZAR    HEME  ELEAZAR    PREVENTION:  F: none  E: replete PRN  N: DASH diet with Ensure x3  DVT ppx: heparin gtt  Dispo: CCU  Code: FULL

## 2022-12-11 NOTE — PROGRESS NOTE ADULT - ASSESSMENT
77 year old male with no documented past medical history presenting from [facility] after being found unresponsive at his nursing home s/p narcan with resolution. Found to have atrial flutter. Echo showed severely reduced LV function with an LV thrombus. Heparin gtt was started and pt transferred to [facility] for [facility]ORENZO and possible ablation. Vascular consulted for cold leg. Bilateral Duplex (12/8) showed acute thrombus completely occluding the left popliteal artery and acute thrombus incompletely occluding the left dorsalis pedis artery and likely acute thrombus completely occluding the left mid superficial femoral artery and extending to the left popliteal artery. Left DP with absent signals. Patient noted to have acute abdominal pain overnight with bloody diarrhea c/f acute mesenteric ischemia and taken emergently to the OR. Now POD0 s/p ex lap, SMA embolectomy, and SBR (80 cm) with wound left open for plans for second look tomorrow.    Plan:   - Recommend continued hep gtt to goal of PTT    - Will F/U with general surgery regarding planning for RTOR  - Rest of care per SICU  - Vascular surgery Team 3C will continue to follow. Please call [phone] with any questions or concerns.

## 2022-12-11 NOTE — CONSULT NOTE ADULT - ASSESSMENT
76 y/o M with no significant PMHx found unresponsive at his nursing home, resolved after narcan in the field, and brought to [place]. Found to have PE, atrial flutter, and large LV thrombus on echo. Transferred to [facility] for further management. Course complicated by acute mesenteric ischemia now s/p embolectomy and bowel resection, septic shock.     #Acute mesenteric ischemia   Now s/p Exlap Embolectomy of SMA and resection of 80cm of SB  open abdomen  - further management and RTOR per SICU team     #HFrEF   Likely tachy induced cardiomyopathy in the setting of atrial flutter  - f/u repeat limited echo, EF appears mod-severe slight improvement with rate control   - Hold off on GDMT while on pressors, and concern for underlying septic shock     #Atrial Flutter   Recommend transition from levophed to vasopressin, given tachycardia and poor reserve   CVP 5, recommend 500cc IVF bolus, goal CVP 8-10   - c/w amio gtt  - c/w  heparin gtt     #LV thrombus   LV thrombus not visualized on repeat bedside limited echo, possible that previously visualized LV thrombus embolized to bowel   - repeat Limited echo with contrast in AM     Will be followed by cardiology consult service.    76 y/o M with no significant PMHx found unresponsive at his nursing home, resolved after narcan in the field, and brought to [facility]. Found to have LV dysfunction (Tachy mediated), PE, atrial flutter, and large LV thrombus on echo. Transferred to [facility] for further management. Course complicated by acute mesenteric ischemia now s/p embolectomy and bowel resection, septic shock.     #Acute mesenteric ischemia   Now s/p Exlap Embolectomy of SMA and resection of 80cm of SB  open abdomen  - further management and RTOR per SICU team     #HFrEF   Likely tachy induced cardiomyopathy in the setting of atrial flutter  - f/u repeat limited echo, EF appears mod-severe slight improvement with rate control   - Hold off on GDMT while on pressors, and concern for underlying septic shock     #Atrial Flutter   Recommend transition from levophed to vasopressin, given tachycardia and poor reserve   CVP 5, recommend 500cc IVF bolus, goal CVP 8-10   - c/w amio gtt  - c/w  heparin gtt     #LV thrombus   LV thrombus not visualized on repeat bedside limited echo, possible that previously visualized LV thrombus embolized to bowel   - repeat Limited echo with contrast in AM     Will be followed by cardiology consult service.

## 2022-12-11 NOTE — PROGRESS NOTE ADULT - SUBJECTIVE AND OBJECTIVE BOX
Vascular Surgery Progress Note    Subjective:     ROS:   Denies Headache, blurred vision, Chest Pain, SOB, Abdominal pain, nausea or vomiting     Social   piperacillin/tazobactam IVPB.- 4.5  piperacillin/tazobactam IVPB.. 4.5  aMIOdarone Infusion 0.5  heparin  Infusion 1500  norepinephrine Infusion 0.05  piperacillin/tazobactam IVPB.- 4.5  piperacillin/tazobactam IVPB.. 4.5      Allergies    No Known Allergies    Intolerances        Vital Signs Last 24 Hrs  T(C): 37.4 (11 Dec 2022 09:00), Max: 38.4 (11 Dec 2022 01:10)  T(F): 99.4 (11 Dec 2022 09:00), Max: 101.2 (11 Dec 2022 01:10)  HR: 118 (11 Dec 2022 10:00) (118 - 140)  BP: 162/109 (11 Dec 2022 07:51) (126/87 - 172/108)  BP(mean): 126 (11 Dec 2022 07:51) (102 - 133)  RR: 27 (11 Dec 2022 10:00) (12 - 43)  SpO2: 97% (11 Dec 2022 10:00) (84% - 100%)    Parameters below as of 11 Dec 2022 10:00  Patient On (Oxygen Delivery Method): ventilator    O2 Concentration (%): 60  I&O's Summary    10 Dec 2022 07:01  -  11 Dec 2022 07:00  --------------------------------------------------------  IN: 1290.8 mL / OUT: 400 mL / NET: 890.8 mL    11 Dec 2022 07:01  -  11 Dec 2022 12:10  --------------------------------------------------------  IN: 0 mL / OUT: 60 mL / NET: -60 mL        Physical Exam:  General:  Pulmonary:  Cardiovascular:  Abdominal:  Extremities:  Pulses:   Right:                                                                          Left:  FEM [ ]2+ [ ]1+ [ ]doppler                                             FEM [ ]2+ [ ]1+ [ ]doppler    POP [ ]2+ [ ]1+ [ ]doppler                                             POP [ ]2+ [ ]1+ [ ]doppler    DP [ ]2+ [ ]1+ [ ]doppler                                                DP [ ]2+ [ ]1+ [ ]doppler  PT[ ]2+ [ ]1+ [ ]doppler                                                  PT [ ]2+ [ ]1+ [ ]doppler      LABS:                        16.1   8.86  )-----------( 275      ( 11 Dec 2022 08:24 )             48.2     12-11    132<L>  |  99  |  10  ----------------------------<  124<H>  4.3   |  25  |  0.68    Ca    8.2<L>      11 Dec 2022 08:24  Phos  3.8     12-11  Mg     2.2     12-11    TPro  6.7  /  Alb  3.4  /  TBili  1.2  /  DBili  x   /  AST  48<H>  /  ALT  26  /  AlkPhos  79  12-11    PT/INR - ( 11 Dec 2022 08:24 )   PT: 16.4 sec;   INR: 1.37          PTT - ( 11 Dec 2022 09:37 )  PTT:38.5 sec      A/P: 77y YO Male      Vascular/PAD:      HTN/HLD:    CAD/CHF:     DM:    CKD:     Anemia:     ID:    Diet:     Activity:     DVTPPx:     Dispo:      Vascular Surgery Progress Note  O/N: Patientnoted to have acute abdominal pain with bloody diarrhea concerning for acute mesenteric ischemia and taken to OR emergently     Subjective:   Patient seen and examined at bedside for post op check. Patient intubated and sedated on fentanyl and midazolam. Vitals reviewed and patient noted to be tachycardic.    Social   piperacillin/tazobactam IVPB.- 4.5  piperacillin/tazobactam IVPB.. 4.5  aMIOdarone Infusion 0.5  heparin  Infusion 1500  norepinephrine Infusion 0.05  piperacillin/tazobactam IVPB.- 4.5  piperacillin/tazobactam IVPB.. 4.5      Allergies    No Known Allergies    Intolerances        Vital Signs Last 24 Hrs  T(C): 37.4 (11 Dec 2022 09:00), Max: 38.4 (11 Dec 2022 01:10)  T(F): 99.4 (11 Dec 2022 09:00), Max: 101.2 (11 Dec 2022 01:10)  HR: 118 (11 Dec 2022 10:00) (118 - 140)  BP: 162/109 (11 Dec 2022 07:51) (126/87 - 172/108)  BP(mean): 126 (11 Dec 2022 07:51) (102 - 133)  RR: 27 (11 Dec 2022 10:00) (12 - 43)  SpO2: 97% (11 Dec 2022 10:00) (84% - 100%)    Parameters below as of 11 Dec 2022 10:00  Patient On (Oxygen Delivery Method): ventilator    O2 Concentration (%): 60  I&O's Summary    10 Dec 2022 07:01  -  11 Dec 2022 07:00  --------------------------------------------------------  IN: 1290.8 mL / OUT: 400 mL / NET: 890.8 mL    11 Dec 2022 07:01  -  11 Dec 2022 12:10  --------------------------------------------------------  IN: 0 mL / OUT: 60 mL / NET: -60 mL        Physical Exam:  General: Intubated and sedated, Frail appearing  Pulmonary: Intubated to vent - volume control with FiO2 60%  Cardiovascular: Tachycardic - sinus rhythm  Abdominal: soft, nondistended, midline wound vac in place to suction  Extremities: bilateral feet cool to touch. Absent doppler signals in PT and DP on L. L popliteal monophasic, palpable R popliteal pulse    LABS:                        16.1   8.86  )-----------( 275      ( 11 Dec 2022 08:24 )             48.2     12-11    132<L>  |  99  |  10  ----------------------------<  124<H>  4.3   |  25  |  0.68    Ca    8.2<L>      11 Dec 2022 08:24  Phos  3.8     12-11  Mg     2.2     12-11    TPro  6.7  /  Alb  3.4  /  TBili  1.2  /  DBili  x   /  AST  48<H>  /  ALT  26  /  AlkPhos  79  12-11    PT/INR - ( 11 Dec 2022 08:24 )   PT: 16.4 sec;   INR: 1.37          PTT - ( 11 Dec 2022 09:37 )  PTT:38.5 sec     No

## 2022-12-11 NOTE — PROGRESS NOTE ADULT - SUBJECTIVE AND OBJECTIVE BOX
**Incomplete Note**    CC: Patient is a 77y old  Male who presents with a chief complaint of A flutter (11 Dec 2022 06:04)      **********TRANSFER NOTE**********    HOSPITAL COURSE:      INTERVAL EVENTS: MAYO    SUBJECTIVE / INTERVAL HPI: Patient seen and examined at bedside.     ROS: negative unless otherwise stated above.    VITAL SIGNS:  Vital Signs Last 24 Hrs  T(C): 37.7 (11 Dec 2022 05:00), Max: 38.4 (11 Dec 2022 01:10)  T(F): 99.8 (11 Dec 2022 05:00), Max: 101.2 (11 Dec 2022 01:10)  HR: 130 (11 Dec 2022 07:51) (127 - 140)  BP: 162/109 (11 Dec 2022 07:51) (126/87 - 172/108)  BP(mean): 126 (11 Dec 2022 07:51) (102 - 133)  RR: 12 (11 Dec 2022 07:51) (12 - 43)  SpO2: 98% (11 Dec 2022 07:51) (84% - 100%)    Parameters below as of 11 Dec 2022 07:49  Patient On (Oxygen Delivery Method): ventilator    O2 Concentration (%): 80      12-10-22 @ 07:01  -  12-11-22 @ 07:00  --------------------------------------------------------  IN: 1290.8 mL / OUT: 400 mL / NET: 890.8 mL        PHYSICAL EXAM:      MEDICATIONS:  MEDICATIONS  (STANDING):  aMIOdarone Infusion 0.5 mG/Min (16.7 mL/Hr) IV Continuous <Continuous>  aspirin enteric coated 81 milliGRAM(s) Oral daily  chlorhexidine 0.12% Liquid 15 milliLiter(s) Oral Mucosa every 12 hours  chlorhexidine 2% Cloths 1 Application(s) Topical <User Schedule>  dextrose 5%. 1000 milliLiter(s) (50 mL/Hr) IV Continuous <Continuous>  dextrose 5%. 1000 milliLiter(s) (100 mL/Hr) IV Continuous <Continuous>  dextrose 50% Injectable 25 Gram(s) IV Push once  dextrose 50% Injectable 12.5 Gram(s) IV Push once  dextrose 50% Injectable 25 Gram(s) IV Push once  fentaNYL   Infusion. 0.5 MICROgram(s)/kG/Hr (3.22 mL/Hr) IV Continuous <Continuous>  glucagon  Injectable 1 milliGRAM(s) IntraMuscular once  heparin  Infusion 1500 Unit(s)/Hr (15 mL/Hr) IV Continuous <Continuous>  influenza  Vaccine (HIGH DOSE) 0.7 milliLiter(s) IntraMuscular once  insulin lispro (ADMELOG) corrective regimen sliding scale   SubCutaneous every 6 hours  midazolam Infusion 0.02 mG/kG/Hr (1.29 mL/Hr) IV Continuous <Continuous>  norepinephrine Infusion 0.05 MICROgram(s)/kG/Min (6.04 mL/Hr) IV Continuous <Continuous>  pantoprazole  Injectable 40 milliGRAM(s) IV Push two times a day  piperacillin/tazobactam IVPB. 4.5 Gram(s) IV Intermittent once  piperacillin/tazobactam IVPB.- 4.5 Gram(s) IV Intermittent once  piperacillin/tazobactam IVPB.. 4.5 Gram(s) IV Intermittent every 8 hours  sodium chloride 0.9% Bolus 500 milliLiter(s) IV Bolus once    MEDICATIONS  (PRN):  dextrose Oral Gel 15 Gram(s) Oral once PRN Blood Glucose LESS THAN 70 milliGRAM(s)/deciliter      ALLERGIES:  Allergies    No Known Allergies    Intolerances        LABS:                        16.1   8.86  )-----------( 275      ( 11 Dec 2022 08:24 )             48.2     12-11    130<L>  |  94<L>  |  11  ----------------------------<  126<H>  4.0   |  26  |  0.78    Ca    8.9      11 Dec 2022 03:44  Phos  2.8     12-11  Mg     2.2     12-11    TPro  7.1  /  Alb  3.4  /  TBili  1.1  /  DBili  x   /  AST  59<H>  /  ALT  30  /  AlkPhos  84  12-11    PT/INR - ( 11 Dec 2022 03:44 )   PT: 16.3 sec;   INR: 1.37          PTT - ( 11 Dec 2022 03:44 )  PTT:73.3 sec    CAPILLARY BLOOD GLUCOSE          RADIOLOGY & ADDITIONAL TESTS: Reviewed. **Incomplete Note**    CC: Patient is a 77y old  Male who presents with a chief complaint of A flutter (11 Dec 2022 06:04)      **********TRANSFER NOTE**********    HOSPITAL COURSE:  76 yo M with no significant PMHx found unresponsive at his nursing home, resolved after Narcan in the field, and brought to [facility], found to have PE, atrial flutter, and large LV thrombus. Patient was continued on a heparin gtt and nipride gtt. For aflutter, patient was tachycardic to the 130s for which he was given digoxin 1g load and started on amiodarone gtt. EP was consulted for possible ablation vs cardioversion, however patient was deemed too high-risk. TTE showed LVEF 10-15%, 2.5x2.3cm mobile echodensity in the LV apex c/w thrombus, severely reduced RV systolic function, L pleural effusion and trivial pericardial effusion. Patient underwent LORENZO the following day which showed no thrombus in the LA/RA/ISIAH/RAA. For aflutter, he was started on amiodarone gtt. Patient underwent arterial duplex which showed soft, likely acute thrombus completely occluding the left popliteal artery; additional soft, likely acute thrombus incompletely occluding the left dorsalis pedis artery; there is a soft, likely acute thrombus completely occluding the left mid superficial femoral artery and extending to the left popliteal artery; distal to this is monophasic waveforms. Patient began vomiting, yawning, diarrhea yesterday, given methadone given concern for opioid withdrawal. Patient had bloody BM and began complaining of abdominal pain. 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 CT also showed irregular cystic structure in L upper pole of L kidney c/f pyelonephritis vs renal infarction. Patient underwent emergent ex lap during which 80cm of ischemic bowel resented and wound vac placed.       VITAL SIGNS:  Vital Signs Last 24 Hrs  T(C): 37.7 (11 Dec 2022 05:00), Max: 38.4 (11 Dec 2022 01:10)  T(F): 99.8 (11 Dec 2022 05:00), Max: 101.2 (11 Dec 2022 01:10)  HR: 130 (11 Dec 2022 07:51) (127 - 140)  BP: 162/109 (11 Dec 2022 07:51) (126/87 - 172/108)  BP(mean): 126 (11 Dec 2022 07:51) (102 - 133)  RR: 12 (11 Dec 2022 07:51) (12 - 43)  SpO2: 98% (11 Dec 2022 07:51) (84% - 100%)    Parameters below as of 11 Dec 2022 07:49  Patient On (Oxygen Delivery Method): ventilator    O2 Concentration (%): 80      12-10-22 @ 07:01  -  12-11-22 @ 07:00  --------------------------------------------------------  IN: 1290.8 mL / OUT: 400 mL / NET: 890.8 mL      MEDICATIONS:  MEDICATIONS  (STANDING):  aMIOdarone Infusion 0.5 mG/Min (16.7 mL/Hr) IV Continuous <Continuous>  aspirin enteric coated 81 milliGRAM(s) Oral daily  chlorhexidine 0.12% Liquid 15 milliLiter(s) Oral Mucosa every 12 hours  chlorhexidine 2% Cloths 1 Application(s) Topical <User Schedule>  dextrose 5%. 1000 milliLiter(s) (50 mL/Hr) IV Continuous <Continuous>  dextrose 5%. 1000 milliLiter(s) (100 mL/Hr) IV Continuous <Continuous>  dextrose 50% Injectable 25 Gram(s) IV Push once  dextrose 50% Injectable 12.5 Gram(s) IV Push once  dextrose 50% Injectable 25 Gram(s) IV Push once  fentaNYL   Infusion. 0.5 MICROgram(s)/kG/Hr (3.22 mL/Hr) IV Continuous <Continuous>  glucagon  Injectable 1 milliGRAM(s) IntraMuscular once  heparin  Infusion 1500 Unit(s)/Hr (15 mL/Hr) IV Continuous <Continuous>  influenza  Vaccine (HIGH DOSE) 0.7 milliLiter(s) IntraMuscular once  insulin lispro (ADMELOG) corrective regimen sliding scale   SubCutaneous every 6 hours  midazolam Infusion 0.02 mG/kG/Hr (1.29 mL/Hr) IV Continuous <Continuous>  norepinephrine Infusion 0.05 MICROgram(s)/kG/Min (6.04 mL/Hr) IV Continuous <Continuous>  pantoprazole  Injectable 40 milliGRAM(s) IV Push two times a day  piperacillin/tazobactam IVPB. 4.5 Gram(s) IV Intermittent once  piperacillin/tazobactam IVPB.- 4.5 Gram(s) IV Intermittent once  piperacillin/tazobactam IVPB.. 4.5 Gram(s) IV Intermittent every 8 hours  sodium chloride 0.9% Bolus 500 milliLiter(s) IV Bolus once    MEDICATIONS  (PRN):  dextrose Oral Gel 15 Gram(s) Oral once PRN Blood Glucose LESS THAN 70 milliGRAM(s)/deciliter      ALLERGIES:  Allergies    No Known Allergies    Intolerances        LABS:                        16.1   8.86  )-----------( 275      ( 11 Dec 2022 08:24 )             48.2     12-11    130<L>  |  94<L>  |  11  ----------------------------<  126<H>  4.0   |  26  |  0.78    Ca    8.9      11 Dec 2022 03:44  Phos  2.8     12-11  Mg     2.2     12-11    TPro  7.1  /  Alb  3.4  /  TBili  1.1  /  DBili  x   /  AST  59<H>  /  ALT  30  /  AlkPhos  84  12-11    PT/INR - ( 11 Dec 2022 03:44 )   PT: 16.3 sec;   INR: 1.37          PTT - ( 11 Dec 2022 03:44 )  PTT:73.3 sec    CAPILLARY BLOOD GLUCOSE          RADIOLOGY & ADDITIONAL TESTS: Reviewed. CC: Patient is a 77y old  Male who presents with a chief complaint of A flutter (11 Dec 2022 06:04)      **********TRANSFER NOTE**********    HOSPITAL COURSE:    76 yo M with history of opiate abuse who was found unresponsive at his nursing home, resolved after Narcan in the field, and brought to [facility], where he was found to have atrial flutter with RVR and PE. CTA from OSH showed subsegmental PE of RUL, for which patient was continued on a heparin gtt. For aflutter, patient was tachycardic to the 130s for which he was given digoxin 1g load and started on amiodarone gtt. EP was consulted for possible ablation vs cardioversion, however patient was deemed too high-risk. TTE showed LVEF 10-15%, 2.5x2.3cm mobile echodensity in the LV apex c/w thrombus, severely reduced RV systolic function, L pleural effusion and trivial pericardial effusion. For new HFrEF, he was continued on a nipride gtt for afterload reduction. Patient underwent LORENZO the following day which showed no thrombus in the LA/RA/ISIAH/RAA. For aflutter, he was started on amiodarone gtt. Patient underwent arterial duplex which showed soft, likely acute thrombus completely occluding the left popliteal artery; additional soft, likely acute thrombus incompletely occluding the left dorsalis pedis artery; there is a soft, likely acute thrombus completely occluding the left mid superficial femoral artery and extending to the left popliteal artery; distal to this is monophasic waveforms. Patient began vomiting, yawning, diarrhea yesterday, given methadone given concern for opioid withdrawal. Patient had bloody BM and began complaining of abdominal pain. 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 CT also showed irregular cystic structure in L upper pole of L kidney c/f pyelonephritis vs renal infarction. Patient underwent emergent ex lap during which 80cm of ischemic bowel resented and wound vac placed.       VITAL SIGNS:  Vital Signs Last 24 Hrs  T(C): 37.7 (11 Dec 2022 05:00), Max: 38.4 (11 Dec 2022 01:10)  T(F): 99.8 (11 Dec 2022 05:00), Max: 101.2 (11 Dec 2022 01:10)  HR: 130 (11 Dec 2022 07:51) (127 - 140)  BP: 162/109 (11 Dec 2022 07:51) (126/87 - 172/108)  BP(mean): 126 (11 Dec 2022 07:51) (102 - 133)  RR: 12 (11 Dec 2022 07:51) (12 - 43)  SpO2: 98% (11 Dec 2022 07:51) (84% - 100%)    Parameters below as of 11 Dec 2022 07:49  Patient On (Oxygen Delivery Method): ventilator    O2 Concentration (%): 80      12-10-22 @ 07:01  -  12-11-22 @ 07:00  --------------------------------------------------------  IN: 1290.8 mL / OUT: 400 mL / NET: 890.8 mL      MEDICATIONS:  MEDICATIONS  (STANDING):  aMIOdarone Infusion 0.5 mG/Min (16.7 mL/Hr) IV Continuous <Continuous>  aspirin enteric coated 81 milliGRAM(s) Oral daily  chlorhexidine 0.12% Liquid 15 milliLiter(s) Oral Mucosa every 12 hours  chlorhexidine 2% Cloths 1 Application(s) Topical <User Schedule>  dextrose 5%. 1000 milliLiter(s) (50 mL/Hr) IV Continuous <Continuous>  dextrose 5%. 1000 milliLiter(s) (100 mL/Hr) IV Continuous <Continuous>  dextrose 50% Injectable 25 Gram(s) IV Push once  dextrose 50% Injectable 12.5 Gram(s) IV Push once  dextrose 50% Injectable 25 Gram(s) IV Push once  fentaNYL   Infusion. 0.5 MICROgram(s)/kG/Hr (3.22 mL/Hr) IV Continuous <Continuous>  glucagon  Injectable 1 milliGRAM(s) IntraMuscular once  heparin  Infusion 1500 Unit(s)/Hr (15 mL/Hr) IV Continuous <Continuous>  influenza  Vaccine (HIGH DOSE) 0.7 milliLiter(s) IntraMuscular once  insulin lispro (ADMELOG) corrective regimen sliding scale   SubCutaneous every 6 hours  midazolam Infusion 0.02 mG/kG/Hr (1.29 mL/Hr) IV Continuous <Continuous>  norepinephrine Infusion 0.05 MICROgram(s)/kG/Min (6.04 mL/Hr) IV Continuous <Continuous>  pantoprazole  Injectable 40 milliGRAM(s) IV Push two times a day  piperacillin/tazobactam IVPB. 4.5 Gram(s) IV Intermittent once  piperacillin/tazobactam IVPB.- 4.5 Gram(s) IV Intermittent once  piperacillin/tazobactam IVPB.. 4.5 Gram(s) IV Intermittent every 8 hours  sodium chloride 0.9% Bolus 500 milliLiter(s) IV Bolus once    MEDICATIONS  (PRN):  dextrose Oral Gel 15 Gram(s) Oral once PRN Blood Glucose LESS THAN 70 milliGRAM(s)/deciliter      ALLERGIES:  Allergies    No Known Allergies    Intolerances        LABS:                        16.1   8.86  )-----------( 275      ( 11 Dec 2022 08:24 )             48.2     12-11    130<L>  |  94<L>  |  11  ----------------------------<  126<H>  4.0   |  26  |  0.78    Ca    8.9      11 Dec 2022 03:44  Phos  2.8     12-11  Mg     2.2     12-11    TPro  7.1  /  Alb  3.4  /  TBili  1.1  /  DBili  x   /  AST  59<H>  /  ALT  30  /  AlkPhos  84  12-11    PT/INR - ( 11 Dec 2022 03:44 )   PT: 16.3 sec;   INR: 1.37          PTT - ( 11 Dec 2022 03:44 )  PTT:73.3 sec    CAPILLARY BLOOD GLUCOSE          RADIOLOGY & ADDITIONAL TESTS: Reviewed.

## 2022-12-11 NOTE — CONSULT NOTE ADULT - SUBJECTIVE AND OBJECTIVE BOX
HPI:  Patient is a 78 y/o male with no significant past medical history BIBA from [facility] due to unresponsiveness. Patient was reportedly found unresponsive at his nursing home, Narcan was given in the field with resolution, and patient was taken to [facility]. Pt was persistently tachycardic in 130-140s despite receiving Adenosine 6mg IV and then 12mg IV, Lopressor 2.5mg x2, PO Metoprolol tartrate 100mg. Utox was positive for opioids. CTPA suggestive of subsegmental PE in the right upper lobe. Echo showed severely reduced LV function with an LV thrombus. Heparin gtt was started. Cardiology and EP were consulted due to atrial flutter. EP suggested transfer to [facility] for LORENZO and possible ablation.  (07 Dec 2022 12:26)      PAST MEDICAL & SURGICAL HISTORY:      ROS: See HPI    MEDICATIONS  (STANDING):  aMIOdarone Infusion 0.5 mG/Min (16.7 mL/Hr) IV Continuous <Continuous>  aspirin Suppository 150 milliGRAM(s) Rectal daily  chlorhexidine 0.12% Liquid 15 milliLiter(s) Oral Mucosa every 12 hours  chlorhexidine 2% Cloths 1 Application(s) Topical <User Schedule>  dextrose 5%. 1000 milliLiter(s) (50 mL/Hr) IV Continuous <Continuous>  dextrose 5%. 1000 milliLiter(s) (100 mL/Hr) IV Continuous <Continuous>  dextrose 50% Injectable 25 Gram(s) IV Push once  dextrose 50% Injectable 12.5 Gram(s) IV Push once  dextrose 50% Injectable 25 Gram(s) IV Push once  fentaNYL   Infusion. 0.5 MICROgram(s)/kG/Hr (3.22 mL/Hr) IV Continuous <Continuous>  glucagon  Injectable 1 milliGRAM(s) IntraMuscular once  heparin  Infusion 1500 Unit(s)/Hr (15 mL/Hr) IV Continuous <Continuous>  influenza  Vaccine (HIGH DOSE) 0.7 milliLiter(s) IntraMuscular once  insulin lispro (ADMELOG) corrective regimen sliding scale   SubCutaneous every 6 hours  midazolam Infusion 0.02 mG/kG/Hr (1.29 mL/Hr) IV Continuous <Continuous>  norepinephrine Infusion 0.05 MICROgram(s)/kG/Min (6.04 mL/Hr) IV Continuous <Continuous>  pantoprazole  Injectable 40 milliGRAM(s) IV Push two times a day  piperacillin/tazobactam IVPB.- 4.5 Gram(s) IV Intermittent once  piperacillin/tazobactam IVPB.. 4.5 Gram(s) IV Intermittent every 8 hours  sodium chloride 0.9% Bolus 250 milliLiter(s) IV Bolus once    MEDICATIONS  (PRN):  dextrose Oral Gel 15 Gram(s) Oral once PRN Blood Glucose LESS THAN 70 milliGRAM(s)/deciliter      Allergies    No Known Allergies    Intolerances        SOCIAL HISTORY:  Smoke: Never Smoker  EtOH: occasional    FAMILY HISTORY:      Vital Signs Last 24 Hrs  T(C): 37.4 (11 Dec 2022 09:00), Max: 38.4 (11 Dec 2022 01:10)  T(F): 99.4 (11 Dec 2022 09:00), Max: 101.2 (11 Dec 2022 01:10)  HR: 140 (11 Dec 2022 11:51) (118 - 140)  BP: 162/109 (11 Dec 2022 07:51) (126/87 - 172/108)  BP(mean): 126 (11 Dec 2022 07:51) (102 - 133)  RR: 17 (11 Dec 2022 11:51) (12 - 43)  SpO2: 97% (11 Dec 2022 11:51) (84% - 100%)    Parameters below as of 11 Dec 2022 11:51  Patient On (Oxygen Delivery Method): ventilator    O2 Concentration (%): 60    PHYSICAL EXAM    Gen: NAD   Neuro: Sedated intubated  HEENT: ETT in place OGT in place draining minimal brown fluid  CV: RRR reg s1s2 no M  Pulm: CTA B/L no w/w/r  Abd: Soft, Midline open abdomen with abthera wound vac in place  : Uriarte in place draining straw colored urine  Ext: No C/C/E Extremities Cool to touch. No dopplerable pulse in the Left Lext + Rt PT.   Skin: No rashes erythema or ecchymosis  MSK: No joint swelling noted  Psych: unable to assess    LABS:                        16.0   8.00  )-----------( 270      ( 11 Dec 2022 11:35 )             47.7     12-11    132<L>  |  99  |  10  ----------------------------<  124<H>  4.3   |  25  |  0.68    Ca    8.2<L>      11 Dec 2022 08:24  Phos  3.8     12-11  Mg     2.2     12-11    TPro  6.7  /  Alb  3.4  /  TBili  1.2  /  DBili  x   /  AST  48<H>  /  ALT  26  /  AlkPhos  79  12-11    PT/INR - ( 11 Dec 2022 08:24 )   PT: 16.4 sec;   INR: 1.37          PTT - ( 11 Dec 2022 11:35 )  PTT:78.1 sec      RADIOLOGY & ADDITIONAL STUDIES:        Sepsis Flow Sheet MUST BE COMPLETED BY PROVIDER ACCORDING TO CMS GUIDELINES ALL SECTIONS MUST BE COMPLETED    [ ] Source of Infection: 	Ischemi bowel 				  -----------------------------------------------------------------------------------------------------------------------------------------------------------------------------------  [ ] 2 SIRS Criteria Met:    [ x] HR> 90 bpm  [ ] RR >20 rpm  [x] Temp > 100.9 F  [ ] WBC > 12, WBC<4, or Band neutrophils >   ----------------------------------------------------------------------------------------------------------------------------------------------------------------------------------  [ ] 1 End Organ Dysfunction:     [x ] Lactate > 2, then [x ] Repeat Lactate( ordered)   [ ] Lactate >4, then [ ] Repeat Lactate  [ x] Sys BP < 90 mmHg  [x ] MAP < 65 mmHg  [ ] Creatinine > 2.0  or >0.5 above baseline  [ x] Mechanical vent   [ ] Platelets <1000,000  [ ] Bilirubin > 2 mg/dL  [ ] INR > 1.5   [x ] Urine Output < 0.5 mL/kg/hr for 2 hours   -----------------------------------------------------------------------------------------------------------------------------------------------------------------------------------  [x ] Blood cultures drawn from 2 sites:     [ ] Drawn before abx admin- No pt already on Abx from OR    [x ] Site #1, Time drawn:	1:30		    [ ] Site #2, Time drawn:	1:35		  -----------------------------------------------------------------------------------------------------------------------------------------------------------------------------------  [ ] IVF    [ ] Yes, 30 cc/kg administered AND I have re-evaluated the patient’s fluid status and reviewed vital signs. Clinical perfusion assessment was performed.     [x ] No, contraindicated due to: [x ] CHF However lungs clear therefore will re-bolus up to 2L( 30/KG)   			                               [ ] Pulm edema  			                               [ ] LVAD  			                               [ ] ESRD  			                               [ ] COVID-19  -----------------------------------------------------------------------------------------------------------------------------------------------------------------------------------  [ ] Antibiotics    [ x] Zosyn  [ ] Ceftriaxone  [ ] Levaquin  [ ] Meropenem  [ ] Maxipime  [ ] Unasyn   [ ] Other:  -----------------------------------------------------------------------------------------------------------------------------------------------------------------------------------	  Septic Shock:    [x ] Repeat volume status and tissue reperfusion assessment performed after administration of crystalloid fluids                Assessment and Plan:  78 y/o M with Significant PMHx of IVDU found unresponsive at his nursing home, resolved after Narcan in the field, and brought to [facility]. Found to have PE, atrial flutter, and large LV thrombus on echo. Transferred to [facility] for further management. Pt C/o abdominal pain on 12/10 CTA showing mid SMA with embolus. Abnormal distal small bowel loops and cecum with dilatation and pneumatosis suggesting infarcted bowel. One or two tiny foci of  intrahepatic portal vein pneumatosis. Segmental infarction upper pole left kidney. Now s/p Exlap Embolectomy of SMA and resection of 80cm of SB pt left in discontinuity and transferred to SICU intubated.    Neuro: Versed, fentanyl  RASS-4  CV: Hypotensive on Levo 2* Septic Shock: Goal map > 70 Cont Amio gt for Aflutter s/p Dig F/u dig levels, CHF EF 15% Left LV thrombus: Cont heparin( 60-80) Hold Nipride for hypotension, As per Cardiology can bolus as needed, S/p 1L bolus( will continue to give as needed 30/kg is 2L) .  ASA 150mg Supp. Repeat Echo(P)   Pulm: Satting well on vent 40%/520/12/5  GI: NPO NGT to LIWS. Open Abdomen follow LFT's daily for perfusion.   : Uriarte Infarction of upper pole of Left Kidney, F/u urine lytes.  ID: Zosyn ( 12/11-)   Endo: ISS  PPx: SCD Heparin GTT goal ( 60-80)   Lines: Rt TLC ( 12/11-)Bryan( 12/11-)   Wounds: Midline open abdomen  PT/OT: not ordered Strict bedrest 2* Open abd pt should not be log rolled.

## 2022-12-11 NOTE — CONSULT NOTE ADULT - ASSESSMENT
76yo M with no significant PMH/PSx admitted to cardiology service on 12/7 in the setting of severely reduced LV function 2/2 an LV thrombus. Pt now with several days of post-prandial abdominal pain and loose BMs, initially suspected to be 2/2 opioid withdrawal, however on evening of 12/10 had a large bloody BM. 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 Vascular surgery (already following) with plan for OR. General surgery consulted for possible operative assistance in the setting of bowel ischemia.    Recommendations:  Will proceed to OR with vascular surgery for possible operative intervention

## 2022-12-11 NOTE — CONSULT NOTE ADULT - SUBJECTIVE AND OBJECTIVE BOX
HPI:  78 yo M with history of opiate abuse who was found unresponsive at his nursing home, resolved after Narcan in the field, and brought to [facility], where he was found to have atrial flutter with RVR and PE. CTA from OSH showed subsegmental PE of RUL, for which patient was continued on a heparin gtt. For aflutter, patient was tachycardic to the 130s for which he was given digoxin 1g load and started on amiodarone gtt. EP was consulted for possible ablation vs cardioversion, however patient was deemed too high-risk. TTE showed LVEF 10-15%, 2.5x2.3cm mobile echodensity in the LV apex c/w thrombus, severely reduced RV systolic function, L pleural effusion and trivial pericardial effusion. For new HFrEF, he was continued on a nipride gtt for afterload reduction. Patient underwent LORENZO the following day which showed no thrombus in the LA/RA/ISIAH/RAA. For aflutter, he was started on amiodarone gtt. Patient underwent arterial duplex which showed soft, likely acute thrombus completely occluding the left popliteal artery; additional soft, likely acute thrombus incompletely occluding the left dorsalis pedis artery; there is a soft, likely acute thrombus completely occluding the left mid superficial femoral artery and extending to the left popliteal artery; distal to this is monophasic waveforms. Patient began vomiting, yawning, diarrhea yesterday, given methadone given concern for opioid withdrawal. Patient had bloody BM and began complaining of abdominal pain. 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 CT also showed irregular cystic structure in L upper pole of L kidney c/f pyelonephritis vs renal infarction. Patient underwent emergent ex lap during which 80cm of ischemic bowel resented and wound vac placed.    REVIEW OF SYSTEMS:   unable to assess     PAST MEDICAL & SURGICAL HISTORY:  SOCIAL HISTORY:    Allergies  No Known Allergies    Vital Signs Last 24 Hrs  T(C): 37.5 (11 Dec 2022 17:00), Max: 39.2 (11 Dec 2022 13:00)  T(F): 99.5 (11 Dec 2022 17:00), Max: 102.6 (11 Dec 2022 13:00)  HR: 110 (11 Dec 2022 17:00) (110 - 140)  BP: 92/65 (11 Dec 2022 17:00) (80/55 - 172/108)  BP(mean): 75 (11 Dec 2022 17:00) (62 - 133)  RR: 23 (11 Dec 2022 17:00) (12 - 43)  SpO2: 99% (11 Dec 2022 17:00) (84% - 100%)    Parameters below as of 11 Dec 2022 17:00  Patient On (Oxygen Delivery Method): ventilator    O2 Concentration (%): 40  I&O's Summary    10 Dec 2022 07:01  -  11 Dec 2022 07:00  --------------------------------------------------------  IN: 1290.8 mL / OUT: 400 mL / NET: 890.8 mL    11 Dec 2022 07:01  -  11 Dec 2022 17:26  --------------------------------------------------------  IN: 250 mL / OUT: 90 mL / NET: 160 mL    PHYSICAL EXAM:  GENERAL: intubated and sedated   HEAD:  Atraumatic, Normocephalic  EYES: EOMI, conjunctiva and sclera clear  NECK: Supple, No JVD  CHEST/LUNG: Clear to auscultation bilaterally;  HEART: +Tachy, irregularly irregular rate and rhythm; No murmurs  ABDOMEN: Soft, open abdominal surgical wound     LABS                        16.0   8.00  )-----------( 270      ( 11 Dec 2022 11:35 )             47.7                         16.1   8.86  )-----------( 275      ( 11 Dec 2022 08:24 )             48.2     12-11    x   |  97  |  x   ----------------------------<  139<H>  4.4   |  x   |  x   12-11    132<L>  |  99  |  10  ----------------------------<  124<H>  4.3   |  25  |  0.68    Ca    8.2<L>      11 Dec 2022 08:24  Ca    8.9      11 Dec 2022 03:44  Phos  3.8     12-11  Mg     2.2     12-11    TPro  6.7  /  Alb  3.4  /  TBili  1.2  /  DBili  x   /  AST  48<H>  /  ALT  26  /  AlkPhos  79  12-11  TPro  7.1  /  Alb  3.4  /  TBili  1.1  /  DBili  x   /  AST  59<H>  /  ALT  30  /  AlkPhos  84  12-11    CAPILLARY BLOOD GLUCOSE  PT/INR - ( 11 Dec 2022 08:24 )   PT: 16.4 sec;   INR: 1.37     PTT - ( 11 Dec 2022 11:35 )  PTT:78.1 sec   11 Dec 2022 08:58 Troponin 0.12 ng/mL /  U/L / CKMB x     / CKMB Units 5.3 ng/mL  ( 11 Dec 2022 16:23 ) pH: 7.40  /  pCO2: 32    /  pO2: 173   / HCO3: 20    / Base Excess: -4.0  /  SaO2: 99.7    ( 11 Dec 2022 11:35 ) pH: 7.48  /  pCO2: 28    /  pO2: 249   / HCO3: 21    / Base Excess: -1.4  /  SaO2: 99.6    11 Dec 2022 09:04 ) pH: 7.40  /  pCO2: 39    /  pO2: 248   / HCO3: 24    / Base Excess: -0.5  /  SaO2: 99.6    12-11-22 @ 16:23 - VBG - pH: 7.32  | pCO2: 47    | pO2: 38    | Lactate:        12-11-22 @ 11:35 - VBG - pH: 7.40  | pCO2: 40    | pO2: 37    | Lactate:          MEDICATIONS  (STANDING):  aMIOdarone Infusion 0.5 mG/Min (16.7 mL/Hr) IV Continuous <Continuous>  aspirin Suppository 150 milliGRAM(s) Rectal daily  chlorhexidine 0.12% Liquid 15 milliLiter(s) Oral Mucosa every 12 hours  chlorhexidine 2% Cloths 1 Application(s) Topical <User Schedule>  dextrose 5%. 1000 milliLiter(s) (50 mL/Hr) IV Continuous <Continuous>  dextrose 5%. 1000 milliLiter(s) (100 mL/Hr) IV Continuous <Continuous>  dextrose 50% Injectable 25 Gram(s) IV Push once  dextrose 50% Injectable 12.5 Gram(s) IV Push once  dextrose 50% Injectable 25 Gram(s) IV Push once  fentaNYL   Infusion. 0.5 MICROgram(s)/kG/Hr (3.22 mL/Hr) IV Continuous <Continuous>  glucagon  Injectable 1 milliGRAM(s) IntraMuscular once  heparin  Infusion 1500 Unit(s)/Hr (15 mL/Hr) IV Continuous <Continuous>  influenza  Vaccine (HIGH DOSE) 0.7 milliLiter(s) IntraMuscular once  insulin lispro (ADMELOG) corrective regimen sliding scale   SubCutaneous every 6 hours  midazolam Infusion 0.02 mG/kG/Hr (1.29 mL/Hr) IV Continuous <Continuous>  norepinephrine Infusion 0.05 MICROgram(s)/kG/Min (6.04 mL/Hr) IV Continuous <Continuous>  pantoprazole  Injectable 40 milliGRAM(s) IV Push two times a day  piperacillin/tazobactam IVPB.. 4.5 Gram(s) IV Intermittent every 8 hours  vancomycin  IVPB 1000 milliGRAM(s) IV Intermittent every 12 hours  vasopressin Infusion 0.04 Unit(s)/Min (6 mL/Hr) IV Continuous <Continuous>    MEDICATIONS  (PRN):  dextrose Oral Gel 15 Gram(s) Oral once PRN Blood Glucose LESS THAN 70 milliGRAM(s)/deciliter    Tele: John RVR   ECHO FINDINGS:  TTE 12/11/22: Limited echo, Mod-severe LV dysfunction, no LV thrombus visualized.   RADIOLOGY & ADDITIONAL STUDIES:

## 2022-12-11 NOTE — PRE-ANESTHESIA EVALUATION ADULT - NSANTHPMHFT_GEN_ALL_CORE
78 y/o M with no significant PMHx found unresponsive at his nursing home, resolved after Narcan in the field, and brought to [facility]. Found to have PE, atrial flutter, and large LV thrombus on echo. Transferred to [facility] for further management.    NEUROLOGY  #Opioid abuse  Urine tox positive for opioids. Admitted to snorting cocaine and heroin for many years. Denies being an IVDU. Does not use methadone.  - Consider giving methadone 10mg with signs of withdrawal  - Will call psych consult if patient withdrawing    CARDIOVASCULAR  #Atrial flutter  Found to have atrial flutter at [facility]. Transferred to [facility] for possible ablation. HR persistently 130 since arrival to [facility], showing atrial flutter on tele. EP consulted for possible ablation vs cardioversion, however patient deemed too high risk. Will proceed with medical management. S/p Digoxin 1g load. EKG today showing atrial flutter with HR 96.  - Continue Amiodarone 0.5mg gtt x18 hours  - May give Digoxin 0.25mg IVP every other day PRN  - Continue tele monitoring 78 y/o M with no significant PMHx found unresponsive at his nursing home, resolved after Narcan in the field, and brought to [facility]. Found to have PE, atrial flutter, and large LV thrombus on echo. Transferred to [facility] for further management.    NEUROLOGY  #Opioid abuse  Urine tox positive for opioids. Admitted to snorting cocaine and heroin for many years. Denies being an IVDU. Does not use methadone.  - Consider giving methadone 10mg with signs of withdrawal  - Will call psych consult if patient withdrawing    CARDIOVASCULAR  #Atrial flutter  Found to have atrial flutter at [facility]. Transferred to [facility] for possible ablation. HR persistently 130 since arrival to [facility], showing atrial flutter on tele. EP consulted for possible ablation vs cardioversion, however patient deemed too high risk. Will proceed with medical management. S/p Digoxin 1g load. EKG today showing atrial flutter with HR 96.  - Continue Amiodarone 0.5mg gtt x18 hours  - May give Digoxin 0.25mg IVP every other day PRN  - Continue tele monitoring  - Daily EKG    #Acute CHF  No prior medical hx of HF. BNP 15k and echo with LV thrombus at [facility]. 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 Currently euvolemic. TTE 12/7: severely reduced LV systolic function with EF 10-15%, severely reduced RV systolic function, left pleural effusion, trivial pericardial effusion.   - C/w Nipride 1.5 gtt for afterload reduction  - Hold beta blocker for now  - Goal MAP >70-75    #Acute embolic limb ischemia  Patient with cold LE extremities. Absent b/l LE pulses on dopplers. Arterial duplex of b/l LE showing acute thrombus occluding left popliteal artery, acute thrombus completely occluding the left mid superficial femoral artery and extending to the left popliteal artery.   - Vascular consulted emergently  - Patient high risk for emergent high risk procedure, however no specific contraindications and no further cardiac workup indicated. Would continue current regimen of Amio gtt.     #LV thrombus  Echo showing 2.5 x 2.3 cm mobile echodensity in the left ventricular apex, consistent with a thrombus.  - Heparin gtt  - No contraindications to vascular procedure    PULMONARY  #Pulmonary embolism  CTA at [facility] showing evidence of subsegmental PE of right upper lobe. Patient was started on Heparin gtt, then transferred to [facility]. No tachypnea, patient saturating at % on room air. Will continue with full anticoagulation.  - Patient refusing nasal cannula  - C/w Heparin gtt  - Goal PTT  per vascular

## 2022-12-11 NOTE — CONSULT NOTE ADULT - TIME BILLING
evaluation and management of acute mesenteric ischemia, review of labs and imaging, discussion with consultants, discussion with patient regarding etiology of acute mesenteric ischemia and need for exploration with possible bowel resection, possible temporary closure

## 2022-12-11 NOTE — BRIEF OPERATIVE NOTE - OPERATION/FINDINGS
Midline laparotomy performed with findings of ischemic distal small bowel. Bowel run from LOT to ICV with findings of about 80cm demarcated ischemic bowel. Colon appeared dusky at this time. Vascular surgery performed embolectomy of SMA (see brief op for details). Following this bowel again run and decision made to resect the 80cm of ischemic bowel and leave in discontinuity for planned RTOR in 24-48hours. Colon appeared grossly viable so no further resection was performed. Abthera vac placed. Midline laparotomy performed with findings of ischemic distal small bowel. Bowel run from LOT to ICV with findings of about 80cm demarcated ischemic bowel. Colon appeared dusky at this time. Vascular surgery performed embolectomy of SMA (see brief op for details). Following this bowel again run and decision made to resect the 80cm of ischemic bowel with CHELY 75mm blue load x2 and leave in discontinuity for planned RTOR in 24-48hours. Colon appeared grossly viable so no further resection was performed. Abthera vac placed.

## 2022-12-11 NOTE — PROGRESS NOTE ADULT - ASSESSMENT
78 y/o M with no significant PMHx found unresponsive at his nursing home, resolved after Narcan in the field, and brought to [facility]. Found to have PE, atrial flutter, and large LV thrombus on echo. Transferred to [facility] for further management.    NEUROLOGY  #Opioid abuse  Urine tox positive for opioids. Admitted to snorting cocaine and heroin for many years. Denies being an IVDU. Does not use methadone.  - Consider giving methadone 10mg with signs of withdrawal  - Will call psych consult if patient withdrawing    CARDIOVASCULAR  #Atrial flutter  Found to have atrial flutter at [facility]. Transferred to [facility] for possible ablation. HR persistently 130 since arrival to [facility], showing atrial flutter on tele. EP consulted for possible ablation vs cardioversion, however patient deemed too high risk. Will proceed with medical management. S/p Digoxin 1g load. EKG today showing atrial flutter with HR 96.  - Continue Amiodarone 0.5mg gtt x18 hours  - May give Digoxin 0.25mg IVP every other day PRN  - Continue tele monitoring  - Daily EKG    #Acute CHF  No prior medical hx of HF. BNP 15k and echo with LV thrombus at [facility]. 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 Currently euvolemic. TTE 12/7: severely reduced LV systolic function with EF 10-15%, severely reduced RV systolic function, left pleural effusion, trivial pericardial effusion.   - C/w Nipride 1.5 gtt for afterload reduction  - Hold beta blocker for now  - Goal MAP >70-75    #Acute embolic limb ischemia  Patient with cold LE extremities. Absent b/l LE pulses on dopplers. Arterial duplex of b/l LE showing acute thrombus occluding left popliteal artery, acute thrombus completely occluding the left mid superficial femoral artery and extending to the left popliteal artery.   - Vascular consulted emergently  - Patient high risk for emergent high risk procedure, however no specific contraindications and no further cardiac workup indicated. Would continue current regimen of Amio gtt.     #LV thrombus  Echo showing 2.5 x 2.3 cm mobile echodensity in the left ventricular apex, consistent with a thrombus.  - Heparin gtt  - No contraindications to vascular procedure    PULMONARY  #Pulmonary embolism  CTA at [facility] showing evidence of subsegmental PE of right upper lobe. Patient was started on Heparin gtt, then transferred to [facility]. No tachypnea, patient saturating at % on room air. Will continue with full anticoagulation.  - Patient refusing nasal cannula  - C/w Heparin gtt  - Goal PTT  per vascular     GASTROINTESTINAL  #?Hep C infection  Unknown prior hx. Denies IVDU. Positive for Hep C screening.  - F/u Hep C RNA     RENAL  ELEAZAR    INFECTIOUS DISEASE  ELEAZAR    ENDOCRINE  ELEAZAR    HEME  ELEAZAR    PREVENTION:  F: none  E: replete PRN  N: DASH diet with Ensure x3  DVT ppx: heparin gtt  Dispo: CCU  Code: FULL

## 2022-12-11 NOTE — PROGRESS NOTE ADULT - SUBJECTIVE AND OBJECTIVE BOX
Chart reviewed, patient examined, events of last night noted.  Bedside ECHO with contrast was performed - no thrombus identified in the LV - official reading of the ECHO is pending.  Heart rates are in the 110's and his BP is maintained on the current IV medications. Impression: AFL with rapid (but improved) ventricular response.  Likely embolization of the LV thrombus to SMA > ischemic bowel.  Recommendations: No changes to the present EP management at this time.  Discussed with the CCU housestaff.

## 2022-12-11 NOTE — PROGRESS NOTE ADULT - SUBJECTIVE AND OBJECTIVE BOX
INTERVAL HPI/OVERNIGHT EVENTS:    STATUS POST:      POST OPERATIVE DAY #:     SUBJECTIVE:      aMIOdarone Infusion 0.5 mG/Min IV Continuous <Continuous>  aspirin enteric coated 81 milliGRAM(s) Oral daily  heparin  Infusion 1500 Unit(s)/Hr IV Continuous <Continuous>  nitroprusside Infusion 0.5 MICROgram(s)/kG/Min IV Continuous <Continuous>  valsartan 20 milliGRAM(s) Oral every 12 hours      Vital Signs Last 24 Hrs  T(C): 37.7 (11 Dec 2022 05:00), Max: 38.4 (11 Dec 2022 01:10)  T(F): 99.8 (11 Dec 2022 05:00), Max: 101.2 (11 Dec 2022 01:10)  HR: 131 (11 Dec 2022 05:00) (127 - 140)  BP: 142/88 (11 Dec 2022 05:00) (126/87 - 172/108)  BP(mean): 109 (11 Dec 2022 05:00) (102 - 133)  RR: 31 (11 Dec 2022 05:00) (13 - 43)  SpO2: 95% (11 Dec 2022 05:00) (84% - 100%)    Parameters below as of 11 Dec 2022 04:24    O2 Flow (L/min): 2    I&O's Detail    09 Dec 2022 07:01  -  10 Dec 2022 07:00  --------------------------------------------------------  IN:    Amiodarone: 50.1 mL    Heparin Infusion: 350 mL    IV PiggyBack: 200 mL    Nitroprusside: 407.2 mL    Oral Fluid: 425 mL  Total IN: 1432.3 mL    OUT:    Voided (mL): 1320 mL  Total OUT: 1320 mL    Total NET: 112.3 mL      10 Dec 2022 07:01  -  11 Dec 2022 06:04  --------------------------------------------------------  IN:    Amiodarone: 167 mL    Heparin Infusion: 294 mL    IV PiggyBack: 400 mL    Nitroprusside: 429.8 mL  Total IN: 1290.8 mL    OUT:    Voided (mL): 400 mL  Total OUT: 400 mL    Total NET: 890.8 mL          General: NAD, resting comfortably in bed  C/V: NSR  Pulm: Nonlabored breathing, no respiratory distress  Abd: soft, NT/ND.  Extrem: WWP, no edema, SCDs in place  Drains:  Uriarte:      LABS:                        14.7   15.82 )-----------( 270      ( 11 Dec 2022 03:44 )             43.7     12-11    130<L>  |  94<L>  |  11  ----------------------------<  126<H>  4.0   |  26  |  0.78    Ca    8.9      11 Dec 2022 03:44  Phos  2.8     12-11  Mg     2.2     12-11    TPro  7.1  /  Alb  3.4  /  TBili  1.1  /  DBili  x   /  AST  59<H>  /  ALT  30  /  AlkPhos  84  12-11    PT/INR - ( 11 Dec 2022 03:44 )   PT: 16.3 sec;   INR: 1.37          PTT - ( 11 Dec 2022 03:44 )  PTT:73.3 sec      RADIOLOGY & ADDITIONAL STUDIES:

## 2022-12-11 NOTE — BRIEF OPERATIVE NOTE - OPERATION/FINDINGS
Procedure: Ex-lap, SMA embolectomy, bowel resection  Indication: AMI  Description: Ex-lap performed with general surgery, SMA dissected out and vessel loop control was obtained. transverse arteriotomy made and 4F and 3F harinder balloons were passed distally with return of clot until there was good back bleeding. Proximal clot was also retrived. SMA repaired primarily with 6-0 Prolene sutures. Doppler confirmed good flow in SMA distally and in branches. 80cm of bowel was found to be necrotic and removed by general surgery and left open with abthera vac.  EBL: 20cc Procedure: Ex-lap, SMA embolectomy, bowel resection  Indication: AMI  Description: Ex-lap performed with general surgery, SMA dissected out and vessel loop control was obtained. transverse arteriotomy made and 4F and 3F harinder balloons were passed distally with return of clot until there was good back bleeding. Proximal clot was also retrived. SMA repaired primarily with 6-0 Prolene sutures. Doppler confirmed good flow in SMA distally and in branches. 80cm of bowel was found to be necrotic and removed by general surgery and left open with abthera vac.  EBL: 20cc  IVF: 1500cc crystalloid

## 2022-12-11 NOTE — CONSULT NOTE ADULT - SUBJECTIVE AND OBJECTIVE BOX
HPI:  Patient is a 76 y/o male with no significant past medical history BIBA from [facility] due to unresponsiveness. Patient was reportedly found unresponsive at his nursing home, Narcan was given in the field with resolution, and patient was taken to [facility]. Pt was persistently tachycardic in 130-140s despite receiving Adenosine 6mg IV and then 12mg IV, Lopressor 2.5mg x2, PO Metoprolol tartrate 100mg. Utox was positive for opioids. CTPA suggestive of subsegmental PE in the right upper lobe. Echo showed severely reduced LV function with an LV thrombus. Heparin gtt was started. Cardiology and EP were consulted due to atrial flutter. EP suggested transfer to [facility] for LORENZO and possible ablation.  (07 Dec 2022 12:26)    SURGERY ADDENDUM: HPI as above. 76yo M with no significant PMH/PSx admitted to cardiology service on 12/7 in the setting of severely reduced LV function 2/2 an LV thrombus. Pt now with several days of post-prandial abdominal pain and loose BMs, initially suspected to be 2/2 opioid withdrawal, however on evening of 12/10 had a large bloody BM. 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 Vascular surgery (already following) with plan for OR. General surgery consulted for possible operative assistance in the setting of bowel ischemia.    Allergies    No Known Allergies    Intolerances        ICU Vital Signs Last 24 Hrs  T(F): 101.2 (12-11-22 @ 01:10), Max: 101.2 (12-11-22 @ 01:10)  HR: 139 (12-11-22 @ 04:24) (122 - 140)  BP: 172/108 (12-11-22 @ 04:24) (126/87 - 172/108)  BP(mean): 130 (12-11-22 @ 04:24) (102 - 133)  ABP: 153/88 (12-11-22 @ 04:00)  RR: 27 (12-11-22 @ 04:24) (13 - 43)  SpO2: 97% (12-11-22 @ 04:24) (84% - 100%)    General: AAOx3, NAD, , laying comfortably in bed  Cardio: S1,S2, No MRG, RRR  Pulm: Lungs bilaterally clear to auscultation  Abdomen: soft, nondistended, mild generalized TTP, no rebound, no guarding  Extremities: WWP      LABS:    12-11    130<L>  |  94<L>  |  11  ----------------------------<  126<H>  4.0   |  26  |  0.78    Ca    8.9      11 Dec 2022 03:44  Phos  2.8     12-11  Mg     2.2     12-11    TPro  7.1  /  Alb  3.4  /  TBili  1.1  /  DBili  x   /  AST  59<H>  /  ALT  30  /  AlkPhos  84  12-11  LIVER FUNCTIONS - ( 11 Dec 2022 03:44 )  Alb: 3.4 g/dL / Pro: 7.1 g/dL / ALK PHOS: 84 U/L / ALT: 30 U/L / AST: 59 U/L / GGT: x                               14.7   15.82 )-----------( 270      ( 11 Dec 2022 03:44 )             43.7   PT/INR - ( 11 Dec 2022 03:44 )   PT: 16.3 sec;   INR: 1.37          PTT - ( 11 Dec 2022 03:44 )  PTT:73.3 sec  CAPILLARY BLOOD GLUCOSE        ACC: [id number] EXAM: CT ANGIO ABD PELV (W)AW IC    PROCEDURE DATE: 12/11/2022  ******PRELIMINARY REPORT******    ******PRELIMINARY REPORT******          INTERPRETATION: [facility] RADIOLOGIST PRELIMINARY REPORT      Initial report created on 12/11/2022 3:55:15 AM EST  PROCEDURE INFORMATION:  Exam: CTA Abdomen and Pelvis With Contrast  Exam date and time: 12/11/2022 2:22 AM  Age: 77 years old  Clinical indication: R/O mesenteric ischemia    TECHNIQUE:  Imaging protocol: Computed tomographic angiography of the abdomen and pelvis  with contrast.  3D rendering (Not supervised by radiologist): MIP and/or 3D reconstructed  images were created by the technologist.    COMPARISON:  US LOWER EXTREMITY ARTERIAL DUPLEX COMPLETE BILATERAL 12/8/2022 1:28 PM    FINDINGS:  Pleural spaces: Small volume left pleural effusion.    Aorta: No aortic aneurysm. No aortic dissection.  Celiac trunk and mesenteric arteries: There is occlusive thrombosis of the mid  to distal superior mesenteric artery and its branches.  Renal arteries: See "Veins" finding.  Right iliac arteries: No occlusion or significant stenosis.  Left iliac arteries: No occlusion or significant stenosis.  Veins: No pneumatosis or portal/mesenteric venous gas. On both arteriovenous  phase images, there is an irregular cystic structure in the lateral parenchyma  of the upper pole of the left kidney, extending to the surface, with mildly  decreased perfusion surrounding it. This may be a cyst at the site of renal  parenchymal scarring. Pyelonephritis or renal infarction, though less likely,  or not excluded. The left renal artery is patent.    Liver: No mass.  Gallbladder and bile ducts: Unremarkable. No calcified stones. No ductal  dilation.  Pancreas: Unremarkable. No mass. No ductal dilation.  Spleen: Unremarkable. No splenomegaly.  Adrenal glands: Unremarkable. No mass.  Kidneys and ureters: Bilateral renal cysts.  Stomach and bowel: There is inflammatory thickening of the wall of multiple  loops of small bowel in the lower abdomen/pelvis, compatible with ischemic  enteritis. The mid to distal sigmoid colon is completely under distended. The  appearance mild wall thickening is probably due to its under distended state;  however, colonic wall thickening due to ischemic colitis is not excluded on  this study.  Appendix: Appendix not visualized. No evidence of appendicitis.  Intraperitoneal space: No pneumoperitoneum.  Lymph nodes: Unremarkable. No enlarged lymph nodes.    Urinary bladder: Unremarkable. No mass.  Reproductive: Unremarkable as visualized.  Bones/joints: Bilateral hip osteoarthritis.  Soft tissues: Unremarkable.    IMPRESSION:  1. There is occlusive thrombosis of the mid to distal superior mesenteric  artery and its branches.  2. There is inflammatory thickening of the wall of multiple loops of small  bowel in the lower abdomen/pelvis, compatible with ischemic enteritis.  3. No pneumatosis or portal/mesenteric venous gas.  4. Small volume left pleural effusion.  5. The mid to distal sigmoid colon is completely under distended. The  appearance mild wall thickening is probably due to its under distended state;  however, colonic wall thickening due to ischemic colitis is not excluded on  this study.  6. On both arteriovenous phase images, there is an irregular cystic structure  in the lateral parenchyma of the upper pole of the left kidney, extending to  the surface, with mildly decreased perfusion surrounding it. This may be a cyst  at the site of renal parenchymal scarring. Pyelonephritis or renal infarction,  though less likely, or not excluded. The left renal artery is patent.

## 2022-12-11 NOTE — PROGRESS NOTE ADULT - ATTENDING COMMENTS
77M w/ h/o opiate abuse, found to be unresponsive at nursing home, responded to narcan  -> found to have persistent AFlut w/ RVR, [place] [facility] [place] also found to have new acute sCHF, now tx'd to [facility] CCU for further mgmt      -Vascular - Pt. w/ known LLE arterial occlusion discovered on this admission, confirmed on imaging. Likely embolic from LV thrombus -> Vascular following, initially for potential LLE angiogram on Friday, but w/ improving exam, +doppler pulses; currently on ASA, Heparin gtt (PTT goal ). Yesterday pt developed abdominal discomfort, N&V and Diarrhea, HTNive initially consistent w/ Opiate withdrawal (pt. w/ active use before admission), however subsequently had a melanotic BM - underwent CTA Abdomen showing acute SMA occlusion w/ ischemic enteritis. Gen Surgery consulted, pt. went emergently to OR this am, s/p resection ~80cm of ischemic bowel; currently intubated and sedated w/ open abdomen -> tx'd to SICU service w/ Cardiology following closely  -CVS - acute systolic CHF - possibly Tachy-induced CM, has not had formal ischemic w/u; TTE: LVEF 10-15%, Large LV thrombus, Severely reduced RV function, normal RV size; Pt/ initially w/ evidence of low output state likely 2/2 AFlut w/ RVR, responded well to to Nipride gtt w/ plan to transition to Oral GDMT post Rhythm Control. However, pt. w/ LV thrombus and now multiple embolic events including acute mesenteric ischemic c/b ischemic enteritis - currently in Atrial Flutter w/ RVR ~120s, currently HD stable; c/w Heparin gtt; EP following- s/p LORENZO - No LA/ISIAH thrombus - c/w Amiodarone gtt for now; Plan to repeat TTE today, concern that LV thrombus fully embolized; Otherwise euvolemic to dry, currently on Levophed; Would add Vasopressin and try not increase Levophed too much further. Pt. does have elevated MAP goals of ~80s post mesenteric ischemia; Will consult Advanced HF to help further manage; Ok to give IV fluids for BP support especially if increasing pressor requirements  -Pulm - Found to have PE on CTA chest - s/p now s/p intubation for emergent bowel resection, remains intubated and sedated, dry on exam, currently on Heparin gtt   -NPO  -DVT PPx  -Dispo: Transfer to SICU service; Cardiology to follow closely    Dago Mares MD  CCU Attending

## 2022-12-12 NOTE — PROGRESS NOTE ADULT - ASSESSMENT
77 year old male with no documented past medical history presenting from [facility] after being found unresponsive at his nursing home s/p narcan with resolution. Found to have atrial flutter. Echo showed severely reduced LV function with an LV thrombus. Heparin gtt was started and pt transferred to [facility] for LORENZO and possible ablation. Vascular consulted for cold leg. Bilateral Duplex (12/8) showed acute thrombus completely occluding the left popliteal artery and acute thrombus incompletely occluding the left dorsalis pedis artery and likely acute thrombus completely occluding the left mid superficial femoral artery and extending to the left popliteal artery. Left DP with absent signals. Patient noted to have acute abdominal pain overnight with bloody diarrhea c/f acute mesenteric ischemia and taken emergently to the OR. Now POD1 s/p ex lap, SMA embolectomy, and SBR (80 cm) with wound left open for plans for second look today. Stable pressor requirements since yesterday.    Plan:   - Recommend continued hep gtt to goal of PTT    - Wean pressors   - Rest of care per SICU  - Vascular surgery Team 3C will continue to follow. Please call [phone] with any questions or concerns.

## 2022-12-12 NOTE — CHART NOTE - NSCHARTNOTEFT_GEN_A_CORE
Admitting Diagnosis:   Patient is a 77y old  Male who presents with a chief complaint of A flutter (12 Dec 2022 12:24)      PAST MEDICAL & SURGICAL HISTORY:      Current Nutrition Order:  NPO      PO Intake: Good (%) [   ]  Fair (50-75%) [   ] Poor (<25%) [   ]--NA NPO     GI Issues:   Frequent episodes of liquid stool and vomiting, Immodium given and continued to have episodes 12/10   Bloody diarrhea      Pain:    Skin Integrity:    Labs:       131<L>  |  99  |  20  ----------------------------<  145<H>  4.4   |  20<L>  |  1.27    Ca    7.7<L>      12 Dec 2022 03:05  Phos  4.7       Mg     2.3         TPro  5.5<L>  /  Alb  2.4<L>  /  TBili  2.6<H>  /  DBili  2.1<H>  /  AST  25  /  ALT  19  /  AlkPhos  58      CAPILLARY BLOOD GLUCOSE      POCT Blood Glucose.: 138 mg/dL (12 Dec 2022 06:09)  POCT Blood Glucose.: 100 mg/dL (12 Dec 2022 00:21)      Medications:  MEDICATIONS  (STANDING):  acetaminophen   IVPB .. 1000 milliGRAM(s) IV Intermittent once  albuterol/ipratropium for Nebulization 3 milliLiter(s) Nebulizer every 6 hours  aMIOdarone Infusion 0.5 mG/Min (16.7 mL/Hr) IV Continuous <Continuous>  aspirin Suppository 150 milliGRAM(s) Rectal daily  chlorhexidine 0.12% Liquid 15 milliLiter(s) Oral Mucosa every 12 hours  chlorhexidine 2% Cloths 1 Application(s) Topical <User Schedule>  dextrose 5%. 1000 milliLiter(s) (50 mL/Hr) IV Continuous <Continuous>  dextrose 5%. 1000 milliLiter(s) (100 mL/Hr) IV Continuous <Continuous>  dextrose 50% Injectable 25 Gram(s) IV Push once  dextrose 50% Injectable 12.5 Gram(s) IV Push once  dextrose 50% Injectable 25 Gram(s) IV Push once  fentaNYL   Infusion. 0.5 MICROgram(s)/kG/Hr (3.22 mL/Hr) IV Continuous <Continuous>  glucagon  Injectable 1 milliGRAM(s) IntraMuscular once  heparin  Infusion 750 Unit(s)/Hr (7.5 mL/Hr) IV Continuous <Continuous>  influenza  Vaccine (HIGH DOSE) 0.7 milliLiter(s) IntraMuscular once  insulin lispro (ADMELOG) corrective regimen sliding scale   SubCutaneous every 6 hours  midazolam Infusion 0.02 mG/kG/Hr (1.29 mL/Hr) IV Continuous <Continuous>  norepinephrine Infusion 0.05 MICROgram(s)/kG/Min (6.04 mL/Hr) IV Continuous <Continuous>  pantoprazole  Injectable 40 milliGRAM(s) IV Push two times a day  piperacillin/tazobactam IVPB.. 4.5 Gram(s) IV Intermittent every 8 hours  sodium chloride 0.9% Bolus 1000 milliLiter(s) IV Bolus once  sodium chloride 0.9%. 1000 milliLiter(s) (100 mL/Hr) IV Continuous <Continuous>  vasopressin Infusion 0.04 Unit(s)/Min (6 mL/Hr) IV Continuous <Continuous>    MEDICATIONS  (PRN):  dextrose Oral Gel 15 Gram(s) Oral once PRN Blood Glucose LESS THAN 70 milliGRAM(s)/deciliter      6'6''  pounds +-10%  Wt 142 pounds BMI 16.4 %IBW=66%     Weight Change:   *No new EMR wts    Bedscale wt 136 pounds     **PCM:  >> Reports having 1-2 meals/day + ONS. Per pt claims to have 2 ensure/day and 2 nutrament/day - unclear how accurate this is. ?If pt meeting ~75% EER consistently.  >> Does report wt loss.  pounds x6 months ago. Thinks he now is 135 pounds which is consistent with bedscale wt obtained during initial visit by  pounds. Suggest wt loss of 49 pounds/26% body wt loss.  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     Estimated energy needs:  Current body wt for EER based on clinician judgment   Adjust for age, malnutrition, EF, Vent, S/p OR; fluids per team  25-30kcal/k-1950kcal/day   1.4-1.6gm/k-104gm prot/day     Subjective: 78 y/o M with Significant PMHx of IVDU found unresponsive at his nursing home, resolved after Narcan in the field, and brought to [facility]. Found to have PE, atrial flutter, and large LV thrombus on echo. Transferred to [facility] for further management. Pt C/o abdominal pain on 12/10 CTA showing mid SMA with embolus. Abnormal distal small bowel loops and cecum with dilatation and pneumatosis suggesting infarcted bowel. One or two tiny foci of intrahepatic portal vein pneumatosis. Segmental infarction upper pole left kidney. Now s/p Exlap Embolectomy of SMA and resection of 80cm of SB pt left in discontinuity and transferred to SICU intubated. Plan for RTOR today for second look-team reports plan to attempt to extubate Pt s/p OR Pending results.     Pt seen this afternoon on [facility] Under SICU team. Pt vented, AC/VC Mode. Ordered for aMIOdarone, Fentanyl, midazolam, vasopressin, norepinephrine, MAP 83. Pt NPO. NPO to LWS per progress notes, however no NGT at this time. Appeared to have OGT. RN confirms no LWS this AM.   Labs: Lactate 1.7, Na 131, POCT 138 100, Mg 2.3, Na 131, , Phos 4.7.   Please see below for nutritions recommendations. D/w team.     Prior PES: Malnutrition Severe in Chronic state RT presumed intake<EER AEB NFPE, wt loss, PO intake  >>on going  Goal: Pt will meet at least 75% of nutrient needs via feasible route / Show no further s/s of malnutrition    Recommendations:  1. Cont with NPO Pending GI.   >> If pt is extubated, when PO is medically feasible adv diet; clears-> full liquids-> soft low fiber low sodium diet, ensure MAX x3/day as oral nutrition supplements 150kcal/30gm prot per 1 can. Monitor %PO intake and diet tolerance.   >> Should pt remain intubated and alterante means of nutrtion are to begin, Please reconsfult for Recs  2. Labs: monitor BMP, CBC, glucose, lytes, trend renal indices, LFTs, POCT, lipids/TG, lactate; MAP.  3. Monitor daily wts, Skin. Pain/GI per team.  4. RD to remain available for additional nutrition interventions as needed.      Education: NA    Risk Level: High [X   ] Moderate [   ] Low [   ] Admitting Diagnosis:   Patient is a 77y old  Male who presents with a chief complaint of A flutter (12 Dec 2022 12:24)      PAST MEDICAL & SURGICAL HISTORY:      Current Nutrition Order:  NPO      PO Intake: Good (%) [   ]  Fair (50-75%) [   ] Poor (<25%) [   ]--NA NPO     GI Issues:   Frequent episodes of liquid stool and vomiting, Immodium given and continued to have episodes 12/10   Bloody diarrhea      Pain:    Skin Integrity:    Labs:       131<L>  |  99  |  20  ----------------------------<  145<H>  4.4   |  20<L>  |  1.27    Ca    7.7<L>      12 Dec 2022 03:05  Phos  4.7       Mg     2.3         TPro  5.5<L>  /  Alb  2.4<L>  /  TBili  2.6<H>  /  DBili  2.1<H>  /  AST  25  /  ALT  19  /  AlkPhos  58      CAPILLARY BLOOD GLUCOSE      POCT Blood Glucose.: 138 mg/dL (12 Dec 2022 06:09)  POCT Blood Glucose.: 100 mg/dL (12 Dec 2022 00:21)      Medications:  MEDICATIONS  (STANDING):  acetaminophen   IVPB .. 1000 milliGRAM(s) IV Intermittent once  albuterol/ipratropium for Nebulization 3 milliLiter(s) Nebulizer every 6 hours  aMIOdarone Infusion 0.5 mG/Min (16.7 mL/Hr) IV Continuous <Continuous>  aspirin Suppository 150 milliGRAM(s) Rectal daily  chlorhexidine 0.12% Liquid 15 milliLiter(s) Oral Mucosa every 12 hours  chlorhexidine 2% Cloths 1 Application(s) Topical <User Schedule>  dextrose 5%. 1000 milliLiter(s) (50 mL/Hr) IV Continuous <Continuous>  dextrose 5%. 1000 milliLiter(s) (100 mL/Hr) IV Continuous <Continuous>  dextrose 50% Injectable 25 Gram(s) IV Push once  dextrose 50% Injectable 12.5 Gram(s) IV Push once  dextrose 50% Injectable 25 Gram(s) IV Push once  fentaNYL   Infusion. 0.5 MICROgram(s)/kG/Hr (3.22 mL/Hr) IV Continuous <Continuous>  glucagon  Injectable 1 milliGRAM(s) IntraMuscular once  heparin  Infusion 750 Unit(s)/Hr (7.5 mL/Hr) IV Continuous <Continuous>  influenza  Vaccine (HIGH DOSE) 0.7 milliLiter(s) IntraMuscular once  insulin lispro (ADMELOG) corrective regimen sliding scale   SubCutaneous every 6 hours  midazolam Infusion 0.02 mG/kG/Hr (1.29 mL/Hr) IV Continuous <Continuous>  norepinephrine Infusion 0.05 MICROgram(s)/kG/Min (6.04 mL/Hr) IV Continuous <Continuous>  pantoprazole  Injectable 40 milliGRAM(s) IV Push two times a day  piperacillin/tazobactam IVPB.. 4.5 Gram(s) IV Intermittent every 8 hours  sodium chloride 0.9% Bolus 1000 milliLiter(s) IV Bolus once  sodium chloride 0.9%. 1000 milliLiter(s) (100 mL/Hr) IV Continuous <Continuous>  vasopressin Infusion 0.04 Unit(s)/Min (6 mL/Hr) IV Continuous <Continuous>    MEDICATIONS  (PRN):  dextrose Oral Gel 15 Gram(s) Oral once PRN Blood Glucose LESS THAN 70 milliGRAM(s)/deciliter      6'6''  pounds +-10%  Wt 142 pounds BMI 16.4 %IBW=66%     Weight Change:   *No new EMR wts    Bedscale wt 136 pounds     **PCM:  >> Reports having 1-2 meals/day + ONS. Per pt claims to have 2 ensure/day and 2 nutrament/day - unclear how accurate this is. ?If pt meeting ~75% EER consistently.  >> Does report wt loss.  pounds x6 months ago. Thinks he now is 135 pounds which is consistent with bedscale wt obtained during initial visit by  pounds. Suggest wt loss of 49 pounds/26% body wt loss.  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     Estimated energy needs:  Current body wt for EER based on clinician judgment   Adjust for age, malnutrition, EF, Vent, S/p OR; fluids per team  25-30kcal/k-1950kcal/day   1.4-1.6gm/k-104gm prot/day     Subjective: 78 y/o M with Significant PMHx of IVDU found unresponsive at his nursing home, resolved after Narcan in the field, and brought to [facility]. Found to have PE, atrial flutter, and large LV thrombus on echo. Transferred to [facility] for further management. Pt C/o abdominal pain on 12/10 CTA showing mid SMA with embolus. Abnormal distal small bowel loops and cecum with dilatation and pneumatosis suggesting infarcted bowel. One or two tiny foci of intrahepatic portal vein pneumatosis. Segmental infarction upper pole left kidney. Now s/p Exlap Embolectomy of SMA and resection of 80cm of SB pt left in discontinuity and transferred to SICU intubated. Plan for RTOR today for second look-team reports plan to attempt to extubate Pt s/p OR Pending results.     Pt seen this afternoon on [facility] Under SICU team. Pt vented, AC/VC Mode. Ordered for aMIOdarone, Fentanyl, midazolam, vasopressin, norepinephrine, MAP 83. Pt NPO. NPO to LWS per progress notes, however no NGT at this time. Appeared to have OGT. RN confirms no LWS this AM.   Labs: Lactate 1.7, Na 131, POCT 138 100, Mg 2.3, Na 131, , Phos 4.7.   Please see below for nutritions recommendations. D/w team.     Prior PES: Malnutrition Severe in Chronic state RT presumed intake<EER AEB NFPE, wt loss, PO intake  >>on going  Goal: Pt will meet at least 75% of nutrient needs via feasible route / Show no further s/s of malnutrition    Recommendations:  1. Cont with NPO Pending GI.   >> If pt is extubated, when PO is medically feasible bedside dys screen prior to Diet adv. Should pt pass: clears-> full liquids-> soft low fiber low sodium diet, ensure MAX x3/day as oral nutrition supplements 150kcal/30gm prot per 1 can. Monitor %PO intake and diet tolerance.   >> Should pt remain intubated and alternate means of nutrition are to begin, Please reconsult for Recs.  2. Labs: monitor BMP, CBC, glucose, lytes, trend renal indices, LFTs, POCT, lipids/TG, lactate; MAP.  3. Monitor daily wts, Skin. Pain/GI per team.  4. RD to remain available for additional nutrition interventions as needed.      Education: NA    Risk Level: High [X   ] Moderate [   ] Low [   ]

## 2022-12-12 NOTE — PROGRESS NOTE ADULT - ASSESSMENT
78 y/o M with Significant PMHx of IVDU found unresponsive at his nursing home, resolved after Narcan in the field, and brought to [facility]. Found to have PE, atrial flutter, and large LV thrombus on echo. Transferred to [facility] for further management. Pt C/o abdominal pain on 12/10 CTA showing mid SMA with embolus. Abnormal distal small bowel loops and cecum with dilatation and pneumatosis suggesting infarcted bowel. One or two tiny foci of  intrahepatic portal vein pneumatosis. Segmental infarction upper pole left kidney. Now s/p Exlap Embolectomy of SMA and resection of 80cm of SB pt left in discontinuity and transferred to SICU intubated EBL 20cc, 1.5l IVF, uo 150 OVER 2.5 HR.     Neuro: Versed fentanyl  RASS-4  CV: Hypotensive on Levo + Vaso Goal MAP ( > 65)  Cont Amio gt for Aflutter s/p Dig F/u dig levels, CHF EF 15% Left LV thrombus: Cont heparin( 60-80) Hold Nipride for hypotension, ASA 150mg Supp.   Pulm: Satting well on vent 40%/520/12/5   GI: NPO NGT to LIWS. Open Abdomen abthera vac in place   : Uriarte - Oliguric, renal consulted Infarction of upper pole of Left Kidney,  ID: [id number] ( 12/11-) Vanco By level ( 12/11-) Hep C Positive.   Endo: ISS  PPx: SCD Heparin GTT  Lines: Rt TLC ( 12/11-)  Wounds: Midline open abdomen with wound vac in place  PT/OT: not ordered Strict bedrest  Dispo: Pending OR for second look today

## 2022-12-12 NOTE — CONSULT NOTE ADULT - SUBJECTIVE AND OBJECTIVE BOX
HPI:  76 y/o M with no significant PMHx found unresponsive at his nursing home, resolved after narcan in the field, and brought to [facility]. Found to have LV dysfunction (Tachy mediated), PE, atrial flutter with rates in the 130s, and large LV thrombus on echo. Transferred to [facility] for further management. Course complicated by acute mesenteric ischemia now s/p embolectomy and bowel resection s/p ex-lap, septic shock, LLE arterial occlusion.      HFrEF thought to be possible tachy-induced CM, has not had ischemic w/o. Pt/ initially w/ evidence of low output state likely 2/2 AFlut w/ RVR, responded well to to Nipride gtt.       PAST MEDICAL & SURGICAL HISTORY:    PREVIOUS DIAGNOSTIC TESTING:      FAMILY HISTORY:      Social History:  Lives at [facility] (07 Dec 2022 12:26)      ALLERGIES/INTOLERANCES:  No Known Allergies    HOME MEDICATIONS:    INPATIENT MEDICATIONS:  aMIOdarone Infusion 0.5 mG/Min (16.7 mL/Hr) IV Continuous <Continuous>  norepinephrine Infusion 0.05 MICROgram(s)/kG/Min (6.04 mL/Hr) IV Continuous <Continuous>    heparin  Infusion 800 Unit(s)/Hr IV Continuous <Continuous>    acetaminophen   IVPB .. 1000 milliGRAM(s) IV Intermittent once  albuterol/ipratropium for Nebulization 3 milliLiter(s) Nebulizer every 6 hours  aspirin Suppository 150 milliGRAM(s) Rectal daily  chlorhexidine 0.12% Liquid 15 milliLiter(s) Oral Mucosa every 12 hours  chlorhexidine 2% Cloths 1 Application(s) Topical <User Schedule>  dextrose 5%. 1000 milliLiter(s) IV Continuous <Continuous>  dextrose 5%. 1000 milliLiter(s) IV Continuous <Continuous>  dextrose 50% Injectable 25 Gram(s) IV Push once  dextrose 50% Injectable 12.5 Gram(s) IV Push once  dextrose 50% Injectable 25 Gram(s) IV Push once  dextrose Oral Gel 15 Gram(s) Oral once PRN  fentaNYL   Infusion. 0.5 MICROgram(s)/kG/Hr IV Continuous <Continuous>  glucagon  Injectable 1 milliGRAM(s) IntraMuscular once  influenza  Vaccine (HIGH DOSE) 0.7 milliLiter(s) IntraMuscular once  insulin lispro (ADMELOG) corrective regimen sliding scale   SubCutaneous every 6 hours  midazolam Infusion 0.02 mG/kG/Hr IV Continuous <Continuous>  pantoprazole  Injectable 40 milliGRAM(s) IV Push two times a day  piperacillin/tazobactam IVPB.. 4.5 Gram(s) IV Intermittent every 8 hours  sodium chloride 0.9% Bolus 500 milliLiter(s) IV Bolus once  vasopressin Infusion 0.04 Unit(s)/Min IV Continuous <Continuous>    REVIEW OF SYSTEMS: See HPI     PHYSICAL EXAM:  Gen: intubated  HEENT: EOMI   Neck: Flat JVP   Cor: Normal s1, s2. RRR.   Abd: soft, non-tender, non-distended  Ext: no edema    T(C): 36.6 (12-12-22 @ 05:08), Max: 39.2 (12-11-22 @ 13:00)  HR: 78 (12-12-22 @ 08:00) (78 - 140)  BP: 133/73 (12-12-22 @ 08:00) (80/55 - 133/73)  RR: 28 (12-12-22 @ 08:00) (15 - 31)  SpO2: 99% (12-12-22 @ 08:00) (93% - 100%)  Wt(kg): --    I&O's Summary    11 Dec 2022 07:01  -  12 Dec 2022 07:00  --------------------------------------------------------  IN: 2379.4 mL / OUT: 720 mL / NET: 1659.4 mL    12 Dec 2022 07:01  -  12 Dec 2022 09:09  --------------------------------------------------------  IN: 49.1 mL / OUT: 20 mL / NET: 29.1 mL      LABS:                        12.9   12.30 )-----------( 250      ( 12 Dec 2022 03:05 )             38.2     12-12    131<L>  |  99  |  20  ----------------------------<  145<H>  4.4   |  20<L>  |  1.27    Ca    7.7<L>      12 Dec 2022 03:05  Phos  4.7     12-12  Mg     2.3     12-12    TPro  5.5<L>  /  Alb  2.4<L>  /  TBili  2.6<H>  /  DBili  2.1<H>  /  AST  25  /  ALT  19  /  AlkPhos  58  12-12      proBNP: Serum Pro-Brain Natriuretic Peptide: 03656 pg/mL (12-11 @ 16:23)  Serum Pro-Brain Natriuretic Peptide: 51589 pg/mL (12-07 @ 10:42)           HPI:  78 y/o M with no significant PMHx found unresponsive at his nursing home, resolved after narcan in the field, and brought to [facility]. Found to have LV dysfunction (Tachy mediated), PE, atrial flutter with rates in the 130s, and large LV thrombus on echo. Transferred to [facility] for further management. Course complicated by acute mesenteric ischemia now s/p embolectomy and bowel resection s/p ex-lap, septic shock, LLE arterial occlusion.      HFrEF thought to be possible tachy-induced CM, has not had ischemic w/o. Pt/ initially w/ evidence of low output state likely 2/2 AFlut w/ RVR, responded well to to Nipride gtt.       PAST MEDICAL & SURGICAL HISTORY:    PREVIOUS DIAGNOSTIC TESTING:      FAMILY HISTORY:      Social History:  Lives at [facility] (07 Dec 2022 12:26)      ALLERGIES/INTOLERANCES:  No Known Allergies    HOME MEDICATIONS:    INPATIENT MEDICATIONS:  aMIOdarone Infusion 0.5 mG/Min (16.7 mL/Hr) IV Continuous <Continuous>  norepinephrine Infusion 0.05 MICROgram(s)/kG/Min (6.04 mL/Hr) IV Continuous <Continuous>    heparin  Infusion 800 Unit(s)/Hr IV Continuous <Continuous>    acetaminophen   IVPB .. 1000 milliGRAM(s) IV Intermittent once  albuterol/ipratropium for Nebulization 3 milliLiter(s) Nebulizer every 6 hours  aspirin Suppository 150 milliGRAM(s) Rectal daily  chlorhexidine 0.12% Liquid 15 milliLiter(s) Oral Mucosa every 12 hours  chlorhexidine 2% Cloths 1 Application(s) Topical <User Schedule>  dextrose 5%. 1000 milliLiter(s) IV Continuous <Continuous>  dextrose 5%. 1000 milliLiter(s) IV Continuous <Continuous>  dextrose 50% Injectable 25 Gram(s) IV Push once  dextrose 50% Injectable 12.5 Gram(s) IV Push once  dextrose 50% Injectable 25 Gram(s) IV Push once  dextrose Oral Gel 15 Gram(s) Oral once PRN  fentaNYL   Infusion. 0.5 MICROgram(s)/kG/Hr IV Continuous <Continuous>  glucagon  Injectable 1 milliGRAM(s) IntraMuscular once  influenza  Vaccine (HIGH DOSE) 0.7 milliLiter(s) IntraMuscular once  insulin lispro (ADMELOG) corrective regimen sliding scale   SubCutaneous every 6 hours  midazolam Infusion 0.02 mG/kG/Hr IV Continuous <Continuous>  pantoprazole  Injectable 40 milliGRAM(s) IV Push two times a day  piperacillin/tazobactam IVPB.. 4.5 Gram(s) IV Intermittent every 8 hours  sodium chloride 0.9% Bolus 500 milliLiter(s) IV Bolus once  vasopressin Infusion 0.04 Unit(s)/Min IV Continuous <Continuous>    REVIEW OF SYSTEMS: See HPI     PHYSICAL EXAM:  Gen: intubated  HEENT: EOMI   Neck: Flat JVP   Cor: Normal s1, s2. RRR.   Ext: no edema    T(C): 36.6 (12-12-22 @ 05:08), Max: 39.2 (12-11-22 @ 13:00)  HR: 78 (12-12-22 @ 08:00) (78 - 140)  BP: 133/73 (12-12-22 @ 08:00) (80/55 - 133/73)  RR: 28 (12-12-22 @ 08:00) (15 - 31)  SpO2: 99% (12-12-22 @ 08:00) (93% - 100%)  Wt(kg): --    I&O's Summary    11 Dec 2022 07:01  -  12 Dec 2022 07:00  --------------------------------------------------------  IN: 2379.4 mL / OUT: 720 mL / NET: 1659.4 mL    12 Dec 2022 07:01  -  12 Dec 2022 09:09  --------------------------------------------------------  IN: 49.1 mL / OUT: 20 mL / NET: 29.1 mL      LABS:                        12.9   12.30 )-----------( 250      ( 12 Dec 2022 03:05 )             38.2     12-12    131<L>  |  99  |  20  ----------------------------<  145<H>  4.4   |  20<L>  |  1.27    Ca    7.7<L>      12 Dec 2022 03:05  Phos  4.7     12-12  Mg     2.3     12-12    TPro  5.5<L>  /  Alb  2.4<L>  /  TBili  2.6<H>  /  DBili  2.1<H>  /  AST  25  /  ALT  19  /  AlkPhos  58  12-12      proBNP: Serum Pro-Brain Natriuretic Peptide: 22079 pg/mL (12-11 @ 16:23)  Serum Pro-Brain Natriuretic Peptide: 11018 pg/mL (12-07 @ 10:42)

## 2022-12-12 NOTE — CONSULT NOTE ADULT - ASSESSMENT
78 y/o M with no significant PMHx found unresponsive at his nursing home, resolved after narcan in the field, and brought to [facility]. Found to have LV dysfunction (Tachy mediated), sub-segmental PE, atrial flutter, and large LV thrombus on echo. Transferred to [facility] for further management. Course complicated by acute mesenteric ischemia now s/p embolectomy and bowel resection, septic shock, LLE arterial occlusion.      #HFrEF   EF 10-15%, LVIDD 5. Severely reduced RV function.   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   Diuretics: none; goal CVP 8-10   GDMT: holding while on pressors; was on nipride gtt at 1.5 off since 12/11     #Atrial Flutter   Recommend transition from levophed to vasopressin, given tachycardia and poor reserve   - c/w amio gtt  - c/w  heparin gtt     #LV thrombus   LV thrombus not visualized on repeat bedside limited echo, possible that previously visualized LV thrombus embolized to bowel   - repeat Limited echo with contrast     #Acute mesenteric ischemia   Now s/p Exlap Embolectomy of SMA and resection of 80cm of SB  open abdomen  - further management per SICU team    *incomplete     78 y/o M with no significant PMHx found unresponsive at his nursing home, resolved after narcan in the field, and brought to [facility]. Found to have LV dysfunction (Tachy mediated), sub-segmental PE, atrial flutter, and large LV thrombus on echo. Transferred to [facility] for further management. Course complicated by acute mesenteric ischemia now s/p embolectomy and bowel resection, septic shock, LLE arterial occlusion.      #HFrEF   EF 10-15%, LVIDD 5. Severely reduced RV function.   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   Diuretics: none; goal CVP 8  GDMT: holding while on pressors; was on nipride gtt at 1.5 off since 12/11   - daily mixed venous labs (using as a surrogate to calculate CO/CI)  - PRN IVF (250 cc boluses) to maintain CVP ~8    #Atrial Flutter   Recommend transition from levophed to vasopressin, given tachycardia and poor reserve   - c/w amio gtt  - c/w  heparin gtt     #LV thrombus   LV thrombus not visualized on repeat bedside limited echo, possible that previously visualized LV thrombus embolized to bowel   - repeat Limited echo with contrast     #Acute mesenteric ischemia   Now s/p Exlap Embolectomy of SMA and resection of 80cm of SB  open abdomen  - further management per SICU team

## 2022-12-12 NOTE — PROGRESS NOTE ADULT - ASSESSMENT
78yo M with no significant PMH/PSx admitted to cardiology service on 12/7 in the setting of severely reduced LV function 2/2 an LV thrombus. Pt now with several days of post-prandial abdominal pain and loose BMs, initially suspected to be 2/2 opioid withdrawal, however on evening of 12/10 had a large bloody BM. 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 Vascular surgery (already following) with plan for OR. General surgery consulted for possible operative assistance in the setting of bowel ischemia. Now POD1 s/p ex lap, SMA embolectomy and small bowel resection.    Recommendations:  - Plan for RTOR today.   - Hold heparin gtt prior to the operating room  - Continue vancomycin and zosyn  - rest of care per SICU

## 2022-12-12 NOTE — CONSULT NOTE ADULT - SUBJECTIVE AND OBJECTIVE BOX
Patient is a 77y old  Male who presents with a chief complaint of A flutter (12 Dec 2022 09:07)      HPI:  Patient is a 76 y/o male with no significant past medical history BIBA as patient was found down at his nursing home and initially taken to OSH where he was found to have RUL segmental PE and transferred to CCU where he was found to have an LV thrombus ; hospital course c/b acute mesenteric ischemia and ischemic enteritis patient is s/p midline laparatomy where findings suggestive of ischemic distal small bowel additionally s/p embolectomy of SMA; patient currently intubated sedated on levophed and vasopressin for pressor support; uop 700cc/24 hours Cr 1.26 Na 131 K 4.4 bicarb 20 nephrology consulted for elevated cr        PAST MEDICAL & SURGICAL HISTORY:        Allergies:  No Known Allergies      Home Medications:   acetaminophen   IVPB .. 1000 milliGRAM(s) IV Intermittent once  albuterol/ipratropium for Nebulization 3 milliLiter(s) Nebulizer every 6 hours  aMIOdarone Infusion 0.5 mG/Min IV Continuous <Continuous>  aspirin Suppository 150 milliGRAM(s) Rectal daily  chlorhexidine 0.12% Liquid 15 milliLiter(s) Oral Mucosa every 12 hours  chlorhexidine 2% Cloths 1 Application(s) Topical <User Schedule>  dextrose 5%. 1000 milliLiter(s) IV Continuous <Continuous>  dextrose 5%. 1000 milliLiter(s) IV Continuous <Continuous>  dextrose 50% Injectable 25 Gram(s) IV Push once  dextrose 50% Injectable 12.5 Gram(s) IV Push once  dextrose 50% Injectable 25 Gram(s) IV Push once  dextrose Oral Gel 15 Gram(s) Oral once PRN  fentaNYL   Infusion. 0.5 MICROgram(s)/kG/Hr IV Continuous <Continuous>  glucagon  Injectable 1 milliGRAM(s) IntraMuscular once  heparin  Infusion 750 Unit(s)/Hr IV Continuous <Continuous>  influenza  Vaccine (HIGH DOSE) 0.7 milliLiter(s) IntraMuscular once  insulin lispro (ADMELOG) corrective regimen sliding scale   SubCutaneous every 6 hours  midazolam Infusion 0.02 mG/kG/Hr IV Continuous <Continuous>  norepinephrine Infusion 0.05 MICROgram(s)/kG/Min IV Continuous <Continuous>  pantoprazole  Injectable 40 milliGRAM(s) IV Push two times a day  piperacillin/tazobactam IVPB.. 4.5 Gram(s) IV Intermittent every 8 hours  sodium chloride 0.9% Bolus 1000 milliLiter(s) IV Bolus once  sodium chloride 0.9%. 1000 milliLiter(s) IV Continuous <Continuous>  vasopressin Infusion 0.04 Unit(s)/Min IV Continuous <Continuous>      Hospital Medications:   MEDICATIONS  (STANDING):  acetaminophen   IVPB .. 1000 milliGRAM(s) IV Intermittent once  albuterol/ipratropium for Nebulization 3 milliLiter(s) Nebulizer every 6 hours  aMIOdarone Infusion 0.5 mG/Min (16.7 mL/Hr) IV Continuous <Continuous>  aspirin Suppository 150 milliGRAM(s) Rectal daily  chlorhexidine 0.12% Liquid 15 milliLiter(s) Oral Mucosa every 12 hours  chlorhexidine 2% Cloths 1 Application(s) Topical <User Schedule>  dextrose 5%. 1000 milliLiter(s) (50 mL/Hr) IV Continuous <Continuous>  dextrose 5%. 1000 milliLiter(s) (100 mL/Hr) IV Continuous <Continuous>  dextrose 50% Injectable 25 Gram(s) IV Push once  dextrose 50% Injectable 12.5 Gram(s) IV Push once  dextrose 50% Injectable 25 Gram(s) IV Push once  fentaNYL   Infusion. 0.5 MICROgram(s)/kG/Hr (3.22 mL/Hr) IV Continuous <Continuous>  glucagon  Injectable 1 milliGRAM(s) IntraMuscular once  heparin  Infusion 750 Unit(s)/Hr (7.5 mL/Hr) IV Continuous <Continuous>  influenza  Vaccine (HIGH DOSE) 0.7 milliLiter(s) IntraMuscular once  insulin lispro (ADMELOG) corrective regimen sliding scale   SubCutaneous every 6 hours  midazolam Infusion 0.02 mG/kG/Hr (1.29 mL/Hr) IV Continuous <Continuous>  norepinephrine Infusion 0.05 MICROgram(s)/kG/Min (6.04 mL/Hr) IV Continuous <Continuous>  pantoprazole  Injectable 40 milliGRAM(s) IV Push two times a day  piperacillin/tazobactam IVPB.. 4.5 Gram(s) IV Intermittent every 8 hours  sodium chloride 0.9% Bolus 1000 milliLiter(s) IV Bolus once  sodium chloride 0.9%. 1000 milliLiter(s) (100 mL/Hr) IV Continuous <Continuous>  vasopressin Infusion 0.04 Unit(s)/Min (6 mL/Hr) IV Continuous <Continuous>      SOCIAL HISTORY:  Denies ETOh, Smoking,     Family History:  FAMILY HISTORY:        VITALS:  T(F): 96.4 (22 @ 10:51), Max: 102.6 (22 @ 13:00)  HR: 84 (22 @ 12:00)  BP: 135/89 (22 @ 11:00)  RR: 34 (22 @ 12:00)  SpO2: 97% (22 @ 12:00)  Wt(kg): --     @ 07:01  -   @ 07:00  --------------------------------------------------------  IN: 2379.4 mL / OUT: 720 mL / NET: 1659.4 mL     @ 07:01  -   @ 12:33  --------------------------------------------------------  IN: 800.1 mL / OUT: 390 mL / NET: 410.1 mL        CAPILLARY BLOOD GLUCOSE      POCT Blood Glucose.: 138 mg/dL (12 Dec 2022 06:09)  POCT Blood Glucose.: 100 mg/dL (12 Dec 2022 00:21)      Review of Systems:  unable to participate    PHYSICAL EXAM:  GENERAL: intubated sedated   CHEST/LUNG: mechanical breath sounds BL  HEART: Regular rate and rhythm, no murmur, no gallops, no rub   ABDOMEN: wound vac in place   : No flank or supra pubic tenderness.  EXTREMITIES: no pedal edema  SKIN: No rash or skin lesion     LABS:      131<L>  |  99  |  20  ----------------------------<  145<H>  4.4   |  20<L>  |  1.27    Ca    7.7<L>      12 Dec 2022 03:05  Phos  4.7       Mg     2.3         TPro  5.5<L>  /  Alb  2.4<L>  /  TBili  2.6<H>  /  DBili  2.1<H>  /  AST  25  /  ALT  19  /  AlkPhos  58      Creatinine Trend: 1.27 <--, 1.18 <--, 0.68 <--, 0.78 <--, 0.68 <--, 0.71 <--, 0.81 <--, 0.85 <--, 0.85 <--, 0.88 <--, 0.90 <--, 0.98 <--, 1.11 <--, 1.26 <--, 1.25 <--                        12.9   12.30 )-----------( 250      ( 12 Dec 2022 03:05 )             38.2     Urine Studies:  Urinalysis Basic - ( 11 Dec 2022 17:48 )    Color: Orange / Appearance: Clear / S.025 / pH:   Gluc:  / Ketone: Trace mg/dL  / Bili: Moderate / Urobili: 1.0 E.U./dL   Blood:  / Protein: 30 mg/dL / Nitrite: POSITIVE   Leuk Esterase: NEGATIVE / RBC: 5-10 /HPF / WBC < 5 /HPF   Sq Epi:  / Non Sq Epi: 0-5 /HPF / Bacteria: Many /HPF      Sodium, Random Urine: 20 mmol/L ( @ 06:54)  Creatinine, Random Urine: 169 mg/dL ( @ 06:54)  Creatinine, Random Urine: 152 mg/dL ( @ 17:48)  Sodium, Random Urine: 20 mmol/L ( @ 17:48)         Patient is a 77y old  Male who presents with a chief complaint of A flutter (12 Dec 2022 09:07)      HPI:  Patient is a 78 y/o male with no significant past medical history BIBA as patient was found down at his nursing home and initially taken to OSH where he was found to have RUL segmental PE and transferred to CCU where he was found to have an LV thrombus ; hospital course c/b acute mesenteric ischemia and ischemic enteritis patient is s/p midline laparatomy where findings suggestive of ischemic distal small bowel additionally s/p embolectomy of SMA; patient currently intubated sedated on levophed and vasopressin for pressor support; uop 700cc/24 hours Cr 1.26 Na 131 K 4.4 bicarb 20 nephrology consulted for elevated cr        PAST MEDICAL & SURGICAL HISTORY:  As above      Allergies:  No Known Allergies      Home Medications:   acetaminophen   IVPB .. 1000 milliGRAM(s) IV Intermittent once  albuterol/ipratropium for Nebulization 3 milliLiter(s) Nebulizer every 6 hours  aMIOdarone Infusion 0.5 mG/Min IV Continuous <Continuous>  aspirin Suppository 150 milliGRAM(s) Rectal daily  chlorhexidine 0.12% Liquid 15 milliLiter(s) Oral Mucosa every 12 hours  chlorhexidine 2% Cloths 1 Application(s) Topical <User Schedule>  dextrose 5%. 1000 milliLiter(s) IV Continuous <Continuous>  dextrose 5%. 1000 milliLiter(s) IV Continuous <Continuous>  dextrose 50% Injectable 25 Gram(s) IV Push once  dextrose 50% Injectable 12.5 Gram(s) IV Push once  dextrose 50% Injectable 25 Gram(s) IV Push once  dextrose Oral Gel 15 Gram(s) Oral once PRN  fentaNYL   Infusion. 0.5 MICROgram(s)/kG/Hr IV Continuous <Continuous>  glucagon  Injectable 1 milliGRAM(s) IntraMuscular once  heparin  Infusion 750 Unit(s)/Hr IV Continuous <Continuous>  influenza  Vaccine (HIGH DOSE) 0.7 milliLiter(s) IntraMuscular once  insulin lispro (ADMELOG) corrective regimen sliding scale   SubCutaneous every 6 hours  midazolam Infusion 0.02 mG/kG/Hr IV Continuous <Continuous>  norepinephrine Infusion 0.05 MICROgram(s)/kG/Min IV Continuous <Continuous>  pantoprazole  Injectable 40 milliGRAM(s) IV Push two times a day  piperacillin/tazobactam IVPB.. 4.5 Gram(s) IV Intermittent every 8 hours  sodium chloride 0.9% Bolus 1000 milliLiter(s) IV Bolus once  sodium chloride 0.9%. 1000 milliLiter(s) IV Continuous <Continuous>  vasopressin Infusion 0.04 Unit(s)/Min IV Continuous <Continuous>      Hospital Medications:   MEDICATIONS  (STANDING):  acetaminophen   IVPB .. 1000 milliGRAM(s) IV Intermittent once  albuterol/ipratropium for Nebulization 3 milliLiter(s) Nebulizer every 6 hours  aMIOdarone Infusion 0.5 mG/Min (16.7 mL/Hr) IV Continuous <Continuous>  aspirin Suppository 150 milliGRAM(s) Rectal daily  chlorhexidine 0.12% Liquid 15 milliLiter(s) Oral Mucosa every 12 hours  chlorhexidine 2% Cloths 1 Application(s) Topical <User Schedule>  dextrose 5%. 1000 milliLiter(s) (50 mL/Hr) IV Continuous <Continuous>  dextrose 5%. 1000 milliLiter(s) (100 mL/Hr) IV Continuous <Continuous>  dextrose 50% Injectable 25 Gram(s) IV Push once  dextrose 50% Injectable 12.5 Gram(s) IV Push once  dextrose 50% Injectable 25 Gram(s) IV Push once  fentaNYL   Infusion. 0.5 MICROgram(s)/kG/Hr (3.22 mL/Hr) IV Continuous <Continuous>  glucagon  Injectable 1 milliGRAM(s) IntraMuscular once  heparin  Infusion 750 Unit(s)/Hr (7.5 mL/Hr) IV Continuous <Continuous>  influenza  Vaccine (HIGH DOSE) 0.7 milliLiter(s) IntraMuscular once  insulin lispro (ADMELOG) corrective regimen sliding scale   SubCutaneous every 6 hours  midazolam Infusion 0.02 mG/kG/Hr (1.29 mL/Hr) IV Continuous <Continuous>  norepinephrine Infusion 0.05 MICROgram(s)/kG/Min (6.04 mL/Hr) IV Continuous <Continuous>  pantoprazole  Injectable 40 milliGRAM(s) IV Push two times a day  piperacillin/tazobactam IVPB.. 4.5 Gram(s) IV Intermittent every 8 hours  sodium chloride 0.9% Bolus 1000 milliLiter(s) IV Bolus once  sodium chloride 0.9%. 1000 milliLiter(s) (100 mL/Hr) IV Continuous <Continuous>  vasopressin Infusion 0.04 Unit(s)/Min (6 mL/Hr) IV Continuous <Continuous>      SOCIAL HISTORY:  Denies ETOh, Smoking,     Family History:  FAMILY HISTORY:  Non contributory to current admission      VITALS:  T(F): 96.4 (22 @ 10:51), Max: 102.6 (22 @ 13:00)  HR: 84 (22 @ 12:00)  BP: 135/89 (22 @ 11:00)  RR: 34 (22 @ 12:00)  SpO2: 97% (22 @ 12:00)  Wt(kg): --     @ 07:01  -   @ 07:00  --------------------------------------------------------  IN: 2379.4 mL / OUT: 720 mL / NET: 1659.4 mL     @ 07:01  -   @ 12:33  --------------------------------------------------------  IN: 800.1 mL / OUT: 390 mL / NET: 410.1 mL        CAPILLARY BLOOD GLUCOSE      POCT Blood Glucose.: 138 mg/dL (12 Dec 2022 06:09)  POCT Blood Glucose.: 100 mg/dL (12 Dec 2022 00:21)      Review of Systems:  unable to participate    PHYSICAL EXAM:  GENERAL: intubated sedated   CHEST/LUNG: mechanical breath sounds BL  HEART: Regular rate and rhythm, no murmur, no gallops, no rub   ABDOMEN: wound vac in place   : No flank or supra pubic tenderness.  EXTREMITIES: no pedal edema, warm  SKIN: No rash or skin lesion   NEURO: Sedated, NAD    LABS:      131<L>  |  99  |  20  ----------------------------<  145<H>  4.4   |  20<L>  |  1.27    Ca    7.7<L>      12 Dec 2022 03:05  Phos  4.7       Mg     2.3         TPro  5.5<L>  /  Alb  2.4<L>  /  TBili  2.6<H>  /  DBili  2.1<H>  /  AST  25  /  ALT  19  /  AlkPhos  58      Creatinine Trend: 1.27 <--, 1.18 <--, 0.68 <--, 0.78 <--, 0.68 <--, 0.71 <--, 0.81 <--, 0.85 <--, 0.85 <--, 0.88 <--, 0.90 <--, 0.98 <--, 1.11 <--, 1.26 <--, 1.25 <--                        12.9   12.30 )-----------( 250      ( 12 Dec 2022 03:05 )             38.2     Urine Studies:  Urinalysis Basic - ( 11 Dec 2022 17:48 )    Color: Orange / Appearance: Clear / S.025 / pH:   Gluc:  / Ketone: Trace mg/dL  / Bili: Moderate / Urobili: 1.0 E.U./dL   Blood:  / Protein: 30 mg/dL / Nitrite: POSITIVE   Leuk Esterase: NEGATIVE / RBC: 5-10 /HPF / WBC < 5 /HPF   Sq Epi:  / Non Sq Epi: 0-5 /HPF / Bacteria: Many /HPF      Sodium, Random Urine: 20 mmol/L ( @ 06:54)  Creatinine, Random Urine: 169 mg/dL ( @ 06:54)  Creatinine, Random Urine: 152 mg/dL ( @ 17:48)  Sodium, Random Urine: 20 mmol/L ( @ 17:48)

## 2022-12-12 NOTE — PROGRESS NOTE ADULT - ASSESSMENT
77 year old, nursing home resident, no documented past medical history, transferred from  with typical atrial flutter.  Noted to have RUL subsegmental PE, large LV thrombus (appears fresh) with LVEF 10-15%.  Also has LLE DVT.  When seen last week, we recommended rate control while heparinizing him given this LV thrombus. Over the weekend, he developed ischemic enteritis and underwent emergent ex lap, SMA embolectomy and small bowel resection on 12/10/22.  Repeat Echo showed no clot in LV.    - Ventricular rate controlled in aflutter.  Once he is stable from surgical standpoint and able to resume anticoagulation, before discharge home we will reassess on rhythm control (ie EPS / aflutter ablation).    - continue Amio gtt for now.   - Case d/w Dr. Lynch

## 2022-12-12 NOTE — CONSULT NOTE ADULT - ASSESSMENT
Patient is a 78 y/o male with no significant past medical history BIBA as patient was found down at his nursing home and initially taken to OSH where he was found to have RUL segmental PE and transferred to CCU where he was found to have an LV thrombus ; hospital course c/b acute mesenteric ischemia. Nephrology consulted for elevated creatinine.     Assessment/Plan:   #non-oliguric GOLDIE on CKD likely of pre-renal etiology  #hyponatremia, hypovolemic   UOP 700cc/2h   uPCR pending   FeNA suggestive of pre-renal etiology   etiology of GOLDIE includes:   likely pre-renal etiology i//so ongoing insensible losses     Recommend:   would give 1L NS bolus and start maintenance fluids NS @100cc/hour  would send cystatin C  please obtain urine protein-creatinine ratio   daily BMP   renal sono  strict I/Os   renal diet   maintain MAP >70    Hyponatremia  i/s/o ARF  will continue to monitor   make sure drips are in NS and not D5W  Thank you for the opportunity to participate in the care of your patient. The nephrology service remains available to assist with any questions or concerns. Please feel free to reach us by paging the on-call nephrology fellow for urgent issues or as below.     Aura Gan D.O  PGY-5, Nephrology Fellow   P: [phone]

## 2022-12-12 NOTE — PROGRESS NOTE ADULT - SUBJECTIVE AND OBJECTIVE BOX
STATUS POST:  Embolectomy, artery, superior mesenteric    Exploratory laparotomy    Exploratory laparotomy with small bowel resection    POST OPERATIVE DAY #: 1    SUBJECTIVE:   Patient seen and examined at bedside. He is intubated and sedated. Overnight prevena vac noted to sanguinous output with expansion of midline wound. Patient is still on levophed and vasopressin.      Vital Signs Last 24 Hrs  T(C): 36.6 (12 Dec 2022 05:08), Max: 39.2 (11 Dec 2022 13:00)  T(F): 97.9 (12 Dec 2022 05:08), Max: 102.6 (11 Dec 2022 13:00)  HR: 78 (12 Dec 2022 07:44) (78 - 140)  BP: 130/76 (12 Dec 2022 07:00) (80/55 - 130/76)  BP(mean): 98 (12 Dec 2022 07:00) (62 - 98)  RR: 31 (12 Dec 2022 07:44) (15 - 31)  SpO2: 99% (12 Dec 2022 07:44) (93% - 100%)    Parameters below as of 11 Dec 2022 20:43  Patient On (Oxygen Delivery Method): ventilator        I&O's Summary    11 Dec 2022 07:01  -  12 Dec 2022 07:00  --------------------------------------------------------  IN: 2379.4 mL / OUT: 720 mL / NET: 1659.4 mL    12 Dec 2022 07:01  -  12 Dec 2022 08:28  --------------------------------------------------------  IN: 49.1 mL / OUT: 20 mL / NET: 29.1 mL        Physical Exam:  General: Intubated and sedated  Pulmonary: Ventilator  Cardiovascular: NSR  Abdomen: Soft, nondistended, midline prevena with sanguinous output  Extremities: Feet cold to the touch, no SCDs in place    LABS:                        12.9   12.30 )-----------( 250      ( 12 Dec 2022 03:05 )             38.2     12-12    131<L>  |  99  |  20  ----------------------------<  145<H>  4.4   |  20<L>  |  1.27    Ca    7.7<L>      12 Dec 2022 03:05  Phos  4.7     12-  Mg     2.3     12-12    TPro  5.5<L>  /  Alb  2.4<L>  /  TBili  2.6<H>  /  DBili  2.1<H>  /  AST  25  /  ALT  19  /  AlkPhos  58  12-12    PT/INR - ( 12 Dec 2022 03:05 )   PT: 22.2 sec;   INR: 1.85          PTT - ( 12 Dec 2022 03:05 )  PTT:66.9 sec  Urinalysis Basic - ( 11 Dec 2022 17:48 )    Color: Orange / Appearance: Clear / S.025 / pH: x  Gluc: x / Ketone: Trace mg/dL  / Bili: Moderate / Urobili: 1.0 E.U./dL   Blood: x / Protein: 30 mg/dL / Nitrite: POSITIVE   Leuk Esterase: NEGATIVE / RBC: 5-10 /HPF / WBC < 5 /HPF   Sq Epi: x / Non Sq Epi: 0-5 /HPF / Bacteria: Many /HP

## 2022-12-12 NOTE — PROGRESS NOTE ADULT - SUBJECTIVE AND OBJECTIVE BOX
SUBJECTIVE: Pt seen and examined at bedside this am. Intubated, sedated, on pressors. POD 1 from SMA embolectomy and small bowel resection.     MEDICATIONS  (STANDING):  acetaminophen   IVPB .. 1000 milliGRAM(s) IV Intermittent once  aMIOdarone Infusion 0.5 mG/Min (16.7 mL/Hr) IV Continuous <Continuous>  aspirin Suppository 150 milliGRAM(s) Rectal daily  chlorhexidine 0.12% Liquid 15 milliLiter(s) Oral Mucosa every 12 hours  chlorhexidine 2% Cloths 1 Application(s) Topical <User Schedule>  dextrose 5%. 1000 milliLiter(s) (50 mL/Hr) IV Continuous <Continuous>  dextrose 5%. 1000 milliLiter(s) (100 mL/Hr) IV Continuous <Continuous>  dextrose 50% Injectable 25 Gram(s) IV Push once  dextrose 50% Injectable 12.5 Gram(s) IV Push once  dextrose 50% Injectable 25 Gram(s) IV Push once  fentaNYL   Infusion. 0.5 MICROgram(s)/kG/Hr (3.22 mL/Hr) IV Continuous <Continuous>  glucagon  Injectable 1 milliGRAM(s) IntraMuscular once  heparin  Infusion 800 Unit(s)/Hr (8 mL/Hr) IV Continuous <Continuous>  influenza  Vaccine (HIGH DOSE) 0.7 milliLiter(s) IntraMuscular once  insulin lispro (ADMELOG) corrective regimen sliding scale   SubCutaneous every 6 hours  midazolam Infusion 0.02 mG/kG/Hr (1.29 mL/Hr) IV Continuous <Continuous>  norepinephrine Infusion 0.05 MICROgram(s)/kG/Min (6.04 mL/Hr) IV Continuous <Continuous>  pantoprazole  Injectable 40 milliGRAM(s) IV Push two times a day  piperacillin/tazobactam IVPB.. 4.5 Gram(s) IV Intermittent every 8 hours  vancomycin  IVPB 1000 milliGRAM(s) IV Intermittent every 12 hours  vasopressin Infusion 0.04 Unit(s)/Min (6 mL/Hr) IV Continuous <Continuous>    MEDICATIONS  (PRN):  dextrose Oral Gel 15 Gram(s) Oral once PRN Blood Glucose LESS THAN 70 milliGRAM(s)/deciliter      Vital Signs Last 24 Hrs  T(C): 36.6 (12 Dec 2022 05:08), Max: 39.2 (11 Dec 2022 13:00)  T(F): 97.9 (12 Dec 2022 05:08), Max: 102.6 (11 Dec 2022 13:00)  HR: 78 (12 Dec 2022 08:00) (78 - 140)  BP: 133/73 (12 Dec 2022 08:00) (80/55 - 133/73)  BP(mean): 98 (12 Dec 2022 08:00) (62 - 98)  RR: 28 (12 Dec 2022 08:00) (15 - 31)  SpO2: 99% (12 Dec 2022 08:00) (93% - 100%)    Parameters below as of 11 Dec 2022 20:43  Patient On (Oxygen Delivery Method): ventilator        Physical Exam  General: NAD, sedated in bed  Pulmonary: Nonlabored breathing, intubated  CV: NSR  Abd: soft, abthera in place with collection of blood over the sponge. Wound VAC full with sanguinous fluid  Extremities: (-) edema, warm, well-perfused, no L DP/PT signals, R PT mono, no R DP      I&O's Detail    11 Dec 2022 07:01  -  12 Dec 2022 07:00  --------------------------------------------------------  IN:    FentaNYL: 196.4 mL    Heparin: 56 mL    Heparin: 105 mL    IV PiggyBack: 750 mL    Midazolam: 292.8 mL    Norepinephrine: 334.8 mL    Norepinephrine: 18.1 mL    Norepinephrine: 36.2 mL    Sodium Chloride 0.9% Bolus: 500 mL    Vasopressin: 90 mL  Total IN: 2379.4 mL    OUT:    Blood Loss (mL): 300 mL    Voided (mL): 420 mL  Total OUT: 720 mL    Total NET: 1659.4 mL      12 Dec 2022 07:01  -  12 Dec 2022 08:42  --------------------------------------------------------  IN:    FentaNYL: 9.9 mL    Heparin: 8 mL    Midazolam: 3.2 mL    Norepinephrine: 22 mL    Vasopressin: 6 mL  Total IN: 49.1 mL    OUT:    Voided (mL): 20 mL  Total OUT: 20 mL    Total NET: 29.1 mL          LABS:                        12.9   12.30 )-----------( 250      ( 12 Dec 2022 03:05 )             38.2         131<L>  |  99  |  20  ----------------------------<  145<H>  4.4   |  20<L>  |  1.27    Ca    7.7<L>      12 Dec 2022 03:05  Phos  4.7       Mg     2.3         TPro  5.5<L>  /  Alb  2.4<L>  /  TBili  2.6<H>  /  DBili  2.1<H>  /  AST  25  /  ALT  19  /  AlkPhos  58  12-12    PT/INR - ( 12 Dec 2022 03:05 )   PT: 22.2 sec;   INR: 1.85          PTT - ( 12 Dec 2022 03:05 )  PTT:66.9 sec  Urinalysis Basic - ( 11 Dec 2022 17:48 )    Color: Orange / Appearance: Clear / S.025 / pH: x  Gluc: x / Ketone: Trace mg/dL  / Bili: Moderate / Urobili: 1.0 E.U./dL   Blood: x / Protein: 30 mg/dL / Nitrite: POSITIVE   Leuk Esterase: NEGATIVE / RBC: 5-10 /HPF / WBC < 5 /HPF   Sq Epi: x / Non Sq Epi: 0-5 /HPF / Bacteria: Many /HPF        RADIOLOGY & ADDITIONAL STUDIES:

## 2022-12-12 NOTE — PROGRESS NOTE ADULT - SUBJECTIVE AND OBJECTIVE BOX
EPS Progress Note  CC: transfer from  for AFlutter management     S: intubated and sedated.     O: T(C): 36.4 (12-12-22 @ 14:10), Max: 37.9 (12-11-22 @ 22:06)  HR: 87 (12-12-22 @ 16:00) (57 - 117)  BP: 129/94 (12-12-22 @ 16:00) (92/65 - 143/67)  RR: 26 (12-12-22 @ 16:00) (15 - 39)  SpO2: 96% (12-12-22 @ 16:00) (95% - 100%)    TELE:  [facility] VR 60.     PHYSICAL  Constitutional:  intubated and sedated.      Pulm:  ventilated   Cardiac:   + s1/s2, regular   GI: + wound vac  Vascular: cool to touch     LABS:                        12.9   12.30 )-----------( 250      ( 12 Dec 2022 03:05 )             38.2     12-12    131<L>  |  99  |  20  ----------------------------<  145<H>  4.4   |  20<L>  |  1.27    Ca    7.7<L>      12 Dec 2022 03:05  Phos  4.7     12-12  Mg     2.3     12-12    TPro  5.5<L>  /  Alb  2.4<L>  /  TBili  2.6<H>  /  DBili  2.1<H>  /  AST  25  /  ALT  19  /  AlkPhos  58  12-12    PT/INR - ( 12 Dec 2022 03:05 )   PT: 22.2 sec;   INR: 1.85          PTT - ( 12 Dec 2022 08:45 )  PTT:80.0 sec    MEDICATIONS:  acetaminophen   IVPB .. 1000 milliGRAM(s) IV Intermittent once  albuterol/ipratropium for Nebulization 3 milliLiter(s) Nebulizer every 6 hours  aMIOdarone Infusion 0.5 mG/Min IV Continuous <Continuous>  aspirin Suppository 150 milliGRAM(s) Rectal daily  chlorhexidine 0.12% Liquid 15 milliLiter(s) Oral Mucosa every 12 hours  chlorhexidine 2% Cloths 1 Application(s) Topical <User Schedule>  dextrose 5%. 1000 milliLiter(s) IV Continuous <Continuous>  dextrose 5%. 1000 milliLiter(s) IV Continuous <Continuous>  dextrose 50% Injectable 25 Gram(s) IV Push once  dextrose 50% Injectable 12.5 Gram(s) IV Push once  dextrose 50% Injectable 25 Gram(s) IV Push once  dextrose Oral Gel 15 Gram(s) Oral once PRN  fentaNYL   Infusion. 0.5 MICROgram(s)/kG/Hr IV Continuous <Continuous>  glucagon  Injectable 1 milliGRAM(s) IntraMuscular once  heparin  Infusion 750 Unit(s)/Hr IV Continuous <Continuous>  influenza  Vaccine (HIGH DOSE) 0.7 milliLiter(s) IntraMuscular once  insulin lispro (ADMELOG) corrective regimen sliding scale   SubCutaneous every 6 hours  midazolam Infusion 0.02 mG/kG/Hr IV Continuous <Continuous>  norepinephrine Infusion 0.05 MICROgram(s)/kG/Min IV Continuous <Continuous>  pantoprazole  Injectable 40 milliGRAM(s) IV Push two times a day  piperacillin/tazobactam IVPB.. 4.5 Gram(s) IV Intermittent every 8 hours  sodium chloride 0.9%. 1000 milliLiter(s) IV Continuous <Continuous>  vasopressin Infusion 0.04 Unit(s)/Min IV Continuous <Continuous>

## 2022-12-12 NOTE — PROGRESS NOTE ADULT - SUBJECTIVE AND OBJECTIVE BOX
INTERVAL/OVERNIGHT EVENTS:  Trop downtrended 0.12.  --> 36. Hep gtt restarted at 800 U/hr. am PTT 66    SUBJECTIVE: Patient seen at bedside intubated and sedated on pressors. Abthera vac draining sanguinous fluid.     POD # 1  SICU Day #2    Neurologic Medications  acetaminophen   IVPB .. 1000 milliGRAM(s) IV Intermittent once  aspirin Suppository 150 milliGRAM(s) Rectal daily  fentaNYL   Infusion. 0.5 MICROgram(s)/kG/Hr IV Continuous <Continuous>  midazolam Infusion 0.02 mG/kG/Hr IV Continuous <Continuous>    Respiratory Medications  albuterol/ipratropium for Nebulization 3 milliLiter(s) Nebulizer every 6 hours    Cardiovascular Medications  aMIOdarone Infusion 0.5 mG/Min IV Continuous <Continuous>  norepinephrine Infusion 0.05 MICROgram(s)/kG/Min IV Continuous <Continuous>    Gastrointestinal Medications  dextrose 5%. 1000 milliLiter(s) IV Continuous <Continuous>  dextrose 5%. 1000 milliLiter(s) IV Continuous <Continuous>  pantoprazole  Injectable 40 milliGRAM(s) IV Push two times a day  sodium chloride 0.9% Bolus 1000 milliLiter(s) IV Bolus once    Genitourinary Medications    Hematologic/Oncologic Medications  heparin  Infusion 750 Unit(s)/Hr IV Continuous <Continuous>  influenza  Vaccine (HIGH DOSE) 0.7 milliLiter(s) IntraMuscular once    Antimicrobial/Immunologic Medications  piperacillin/tazobactam IVPB.. 4.5 Gram(s) IV Intermittent every 8 hours    Endocrine/Metabolic Medications  dextrose 50% Injectable 25 Gram(s) IV Push once  dextrose 50% Injectable 12.5 Gram(s) IV Push once  dextrose 50% Injectable 25 Gram(s) IV Push once  dextrose Oral Gel 15 Gram(s) Oral once PRN Blood Glucose LESS THAN 70 milliGRAM(s)/deciliter  glucagon  Injectable 1 milliGRAM(s) IntraMuscular once  insulin lispro (ADMELOG) corrective regimen sliding scale   SubCutaneous every 6 hours  vasopressin Infusion 0.04 Unit(s)/Min IV Continuous <Continuous>    Topical/Other Medications  chlorhexidine 0.12% Liquid 15 milliLiter(s) Oral Mucosa every 12 hours  chlorhexidine 2% Cloths 1 Application(s) Topical <User Schedule>      MEDICATIONS  (PRN):  dextrose Oral Gel 15 Gram(s) Oral once PRN Blood Glucose LESS THAN 70 milliGRAM(s)/deciliter      I&O's Detail    11 Dec 2022 07:01  -  12 Dec 2022 07:00  --------------------------------------------------------  IN:    FentaNYL: 196.4 mL    Heparin: 56 mL    Heparin: 105 mL    IV PiggyBack: 750 mL    Midazolam: 292.8 mL    Norepinephrine: 18.1 mL    Norepinephrine: 36.2 mL    Norepinephrine: 334.8 mL    Sodium Chloride 0.9% Bolus: 500 mL    Vasopressin: 90 mL  Total IN: 2379.4 mL    OUT:    Blood Loss (mL): 300 mL    Voided (mL): 420 mL  Total OUT: 720 mL    Total NET: 1659.4 mL      12 Dec 2022 07:01  -  12 Dec 2022 12:24  --------------------------------------------------------  IN:    Amiodarone: 83.5 mL    FentaNYL: 49.3 mL    Heparin: 24 mL    Heparin: 15 mL    Midazolam: 16 mL    Norepinephrine: 82.3 mL    Sodium Chloride 0.9% Bolus: 500 mL    Vasopressin: 30 mL  Total IN: 800.1 mL    OUT:    VAC (Vacuum Assisted Closure) System (mL): 300 mL    Voided (mL): 90 mL  Total OUT: 390 mL    Total NET: 410.1 mL          Vital Signs Last 24 Hrs  T(C): 35.8 (12 Dec 2022 10:51), Max: 39.2 (11 Dec 2022 13:00)  T(F): 96.4 (12 Dec 2022 10:51), Max: 102.6 (11 Dec 2022 13:00)  HR: 84 (12 Dec 2022 12:00) (78 - 133)  BP: 135/89 (12 Dec 2022 11:00) (80/55 - 143/67)  BP(mean): 107 (12 Dec 2022 11:00) (62 - 108)  RR: 34 (12 Dec 2022 12:00) (15 - 34)  SpO2: 97% (12 Dec 2022 12:00) (96% - 100%)    Parameters below as of 12 Dec 2022 12:00  Patient On (Oxygen Delivery Method): ventilator        Gen: NAD   Neuro: Sedated intubated  HEENT: ETT in place OGT in place draining minimal brown fluid  CV: RRR reg s1s2 no M  Pulm: CTA B/L no w/w/r  Abd: Soft, Midline open abdomen with abthera wound vac in place  : Uriarte in place draining straw colored urine  Ext: No C/C/E Extremities Cool to touch. No dopplerable pulse in the Left Lext + Rt PT.   Skin: No rashes erythema or ecchymosis  MSK: No joint swelling noted  Psych: unable to assess  LABS:                        12.9   12.30 )-----------( 250      ( 12 Dec 2022 03:05 )             38.2     12-12    131<L>  |  99  |  20  ----------------------------<  145<H>  4.4   |  20<L>  |  1.27    Ca    7.7<L>      12 Dec 2022 03:05  Phos  4.7     12-12  Mg     2.3     12-12    TPro  5.5<L>  /  Alb  2.4<L>  /  TBili  2.6<H>  /  DBili  2.1<H>  /  AST  25  /  ALT  19  /  AlkPhos  58  12-12    PT/INR - ( 12 Dec 2022 03:05 )   PT: 22.2 sec;   INR: 1.85          PTT - ( 12 Dec 2022 08:45 )  PTT:80.0 sec  Urinalysis Basic - ( 11 Dec 2022 17:48 )    Color: Orange / Appearance: Clear / S.025 / pH: x  Gluc: x / Ketone: Trace mg/dL  / Bili: Moderate / Urobili: 1.0 E.U./dL   Blood: x / Protein: 30 mg/dL / Nitrite: POSITIVE   Leuk Esterase: NEGATIVE / RBC: 5-10 /HPF / WBC < 5 /HPF   Sq Epi: x / Non Sq Epi: 0-5 /HPF / Bacteria: Many /HPF        RADIOLOGY & ADDITIONAL STUDIES:      Culture - Blood (collected 22 @ 13:13)  Source: .Blood Blood-Peripheral  Preliminary Report (22 @ 02:01):    No growth at 12 hours    Culture - Blood (collected 22 @ 13:13)  Source: .Blood Blood-Peripheral  Preliminary Report (22 @ 02:01):    No growth at 12 hours

## 2022-12-13 NOTE — PROGRESS NOTE ADULT - SUBJECTIVE AND OBJECTIVE BOX
SUBJECTIVE: Pt seen and examined at bedside this am. Intubated, sedated, on pressors    MEDICATIONS  (STANDING):  acetaminophen   IVPB .. 1000 milliGRAM(s) IV Intermittent once  albuterol/ipratropium for Nebulization 3 milliLiter(s) Nebulizer every 6 hours  aMIOdarone Infusion 0.5 mG/Min (16.7 mL/Hr) IV Continuous <Continuous>  aspirin Suppository 150 milliGRAM(s) Rectal daily  chlorhexidine 0.12% Liquid 15 milliLiter(s) Oral Mucosa every 12 hours  chlorhexidine 2% Cloths 1 Application(s) Topical <User Schedule>  dextrose 5%. 1000 milliLiter(s) (50 mL/Hr) IV Continuous <Continuous>  dextrose 5%. 1000 milliLiter(s) (100 mL/Hr) IV Continuous <Continuous>  dextrose 50% Injectable 25 Gram(s) IV Push once  dextrose 50% Injectable 12.5 Gram(s) IV Push once  dextrose 50% Injectable 25 Gram(s) IV Push once  fentaNYL   Infusion. 0.5 MICROgram(s)/kG/Hr (3.22 mL/Hr) IV Continuous <Continuous>  glucagon  Injectable 1 milliGRAM(s) IntraMuscular once  heparin  Infusion 800 Unit(s)/Hr (8 mL/Hr) IV Continuous <Continuous>  influenza  Vaccine (HIGH DOSE) 0.7 milliLiter(s) IntraMuscular once  insulin lispro (ADMELOG) corrective regimen sliding scale   SubCutaneous every 6 hours  midazolam Infusion 0.02 mG/kG/Hr (1.29 mL/Hr) IV Continuous <Continuous>  norepinephrine Infusion 0.05 MICROgram(s)/kG/Min (6.04 mL/Hr) IV Continuous <Continuous>  pantoprazole  Injectable 40 milliGRAM(s) IV Push two times a day  piperacillin/tazobactam IVPB.. 4.5 Gram(s) IV Intermittent every 8 hours  sodium chloride 0.9%. 1000 milliLiter(s) (100 mL/Hr) IV Continuous <Continuous>  sodium phosphate 15 milliMole(s)/250 mL IVPB 15 milliMole(s) IV Intermittent once  vasopressin Infusion 0.04 Unit(s)/Min (6 mL/Hr) IV Continuous <Continuous>    MEDICATIONS  (PRN):  dextrose Oral Gel 15 Gram(s) Oral once PRN Blood Glucose LESS THAN 70 milliGRAM(s)/deciliter      Vital Signs Last 24 Hrs  T(C): 37 (13 Dec 2022 06:00), Max: 37 (13 Dec 2022 06:00)  T(F): 98.6 (13 Dec 2022 06:00), Max: 98.6 (13 Dec 2022 06:00)  HR: 93 (13 Dec 2022 07:00) (57 - 108)  BP: 103/67 (12 Dec 2022 17:00) (103/67 - 143/67)  BP(mean): 81 (12 Dec 2022 17:00) (81 - 108)  RR: 15 (13 Dec 2022 07:00) (14 - 39)  SpO2: 99% (13 Dec 2022 07:00) (82% - 100%)    Parameters below as of 13 Dec 2022 07:00  Patient On (Oxygen Delivery Method): ventilator    O2 Concentration (%): 70    Physical Exam  General: NAD, sedated in bed  Pulmonary: Nonlabored breathing, intubated  CV: NSR  Abd: soft, abthera in place with collection of blood over the sponge. Wound VAC full with sanguinous fluid  Extremities: (-) edema, warm, well-perfused, no L DP/PT signals, R PT mono, no R DP      I&O's Detail    12 Dec 2022 07:01  -  13 Dec 2022 07:00  --------------------------------------------------------  IN:    Amiodarone: 400.8 mL    FentaNYL: 228.4 mL    Heparin: 24 mL    Heparin: 217.5 mL    Heparin: 7.5 mL    IV PiggyBack: 100 mL    Midazolam: 81.6 mL    Norepinephrine: 254 mL    sodium chloride 0.9%: 1800 mL    Sodium Chloride 0.9% Bolus: 500 mL    Vasopressin: 144 mL  Total IN: 3757.8 mL    OUT:    Nasogastric/Oral tube (mL): 0 mL    VAC (Vacuum Assisted Closure) System (mL): 800 mL    Voided (mL): 905 mL  Total OUT: 1705 mL    Total NET: 2052.8 mL      13 Dec 2022 07:01  -  13 Dec 2022 07:38  --------------------------------------------------------  IN:    Amiodarone: 16.7 mL    FentaNYL: 9 mL    Heparin: 7.5 mL    Midazolam: 4 mL    Norepinephrine: 20 mL    sodium chloride 0.9%: 100 mL    Vasopressin: 6 mL  Total IN: 163.2 mL    OUT:  Total OUT: 0 mL    Total NET: 163.2 mL          LABS:                        11.0   11.41 )-----------( 260      ( 13 Dec 2022 05:30 )             33.6     12-13    132<L>  |  101  |  28<H>  ----------------------------<  121<H>  4.2   |  20<L>  |  0.96    Ca    7.7<L>      13 Dec 2022 05:30  Phos  2.8     12-13  Mg     2.5     12-13    TPro  5.5<L>  /  Alb  2.0<L>  /  TBili  1.3<H>  /  DBili  0.9<H>  /  AST  18  /  ALT  15  /  AlkPhos  62  12-13    PT/INR - ( 13 Dec 2022 05:30 )   PT: 27.4 sec;   INR: 2.28          PTT - ( 13 Dec 2022 05:30 )  PTT:52.1 sec  Urinalysis Basic - ( 11 Dec 2022 17:48 )    Color: Orange / Appearance: Clear / S.025 / pH: x  Gluc: x / Ketone: Trace mg/dL  / Bili: Moderate / Urobili: 1.0 E.U./dL   Blood: x / Protein: 30 mg/dL / Nitrite: POSITIVE   Leuk Esterase: NEGATIVE / RBC: 5-10 /HPF / WBC < 5 /HPF   Sq Epi: x / Non Sq Epi: 0-5 /HPF / Bacteria: Many /HPF        RADIOLOGY & ADDITIONAL STUDIES:

## 2022-12-13 NOTE — PROGRESS NOTE ADULT - SUBJECTIVE AND OBJECTIVE BOX
INTERVAL EVENTS: arthur    PAST MEDICAL & SURGICAL HISTORY:      MEDICATIONS  (STANDING):  acetaminophen   IVPB .. 1000 milliGRAM(s) IV Intermittent once  albuterol/ipratropium for Nebulization 3 milliLiter(s) Nebulizer every 6 hours  aMIOdarone Infusion 0.5 mG/Min (16.7 mL/Hr) IV Continuous <Continuous>  aspirin Suppository 150 milliGRAM(s) Rectal daily  chlorhexidine 0.12% Liquid 15 milliLiter(s) Oral Mucosa every 12 hours  chlorhexidine 2% Cloths 1 Application(s) Topical <User Schedule>  dextrose 5%. 1000 milliLiter(s) (50 mL/Hr) IV Continuous <Continuous>  dextrose 5%. 1000 milliLiter(s) (100 mL/Hr) IV Continuous <Continuous>  dextrose 50% Injectable 25 Gram(s) IV Push once  dextrose 50% Injectable 12.5 Gram(s) IV Push once  dextrose 50% Injectable 25 Gram(s) IV Push once  fentaNYL   Infusion. 0.5 MICROgram(s)/kG/Hr (3.22 mL/Hr) IV Continuous <Continuous>  glucagon  Injectable 1 milliGRAM(s) IntraMuscular once  heparin  Infusion 800 Unit(s)/Hr (8 mL/Hr) IV Continuous <Continuous>  influenza  Vaccine (HIGH DOSE) 0.7 milliLiter(s) IntraMuscular once  insulin lispro (ADMELOG) corrective regimen sliding scale   SubCutaneous every 6 hours  midazolam Infusion 0.02 mG/kG/Hr (1.29 mL/Hr) IV Continuous <Continuous>  norepinephrine Infusion 0.05 MICROgram(s)/kG/Min (6.04 mL/Hr) IV Continuous <Continuous>  pantoprazole  Injectable 40 milliGRAM(s) IV Push two times a day  piperacillin/tazobactam IVPB.. 4.5 Gram(s) IV Intermittent every 8 hours  sodium chloride 0.9%. 1000 milliLiter(s) (100 mL/Hr) IV Continuous <Continuous>  vasopressin Infusion 0.04 Unit(s)/Min (6 mL/Hr) IV Continuous <Continuous>    MEDICATIONS  (PRN):  dextrose Oral Gel 15 Gram(s) Oral once PRN Blood Glucose LESS THAN 70 milliGRAM(s)/deciliter    Vital Signs Last 24 Hrs  T(C): 37.4 (13 Dec 2022 10:00), Max: 37.4 (13 Dec 2022 10:00)  T(F): 99.3 (13 Dec 2022 10:00), Max: 99.3 (13 Dec 2022 10:00)  HR: 112 (13 Dec 2022 11:57) (57 - 112)  BP: 136/88 (13 Dec 2022 11:40) (103/67 - 136/88)  BP(mean): 107 (13 Dec 2022 11:40) (81 - 108)  RR: 15 (13 Dec 2022 11:40) (14 - 34)  SpO2: 100% (13 Dec 2022 11:57) (82% - 100%)    Parameters below as of 13 Dec 2022 11:57  Patient On (Oxygen Delivery Method): ventilator    O2 Concentration (%): 70    PHYSICAL EXAM:  GEN: intubated  RESP: CTA b/l  CV: RRR. Normal S1/S2. No m/r/g.  ABD: soft, non-distended  EXT: No edema     LABS:                        10.8   9.97  )-----------( 236      ( 13 Dec 2022 12:10 )             33.5     12-13    134<L>  |  102  |  24<H>  ----------------------------<  125<H>  4.1   |  23  |  0.82    Ca    7.3<L>      13 Dec 2022 12:10  Phos  3.2     12-13  Mg     2.3     12-13    TPro  5.5<L>  /  Alb  2.0<L>  /  TBili  1.3<H>  /  DBili  0.9<H>  /  AST  18  /  ALT  15  /  AlkPhos  62  12-13    CARDIAC MARKERS ( 11 Dec 2022 20:19 )  x     / 0.12 ng/mL / 273 U/L / x     / 1.8 ng/mL  CARDIAC MARKERS ( 11 Dec 2022 16:23 )  x     / 0.16 ng/mL / x     / x     / x          PT/INR - ( 13 Dec 2022 05:30 )   PT: 27.4 sec;   INR: 2.28          PTT - ( 13 Dec 2022 05:30 )  PTT:52.1 sec  Urinalysis Basic - ( 11 Dec 2022 17:48 )    Color: Orange / Appearance: Clear / S.025 / pH: x  Gluc: x / Ketone: Trace mg/dL  / Bili: Moderate / Urobili: 1.0 E.U./dL   Blood: x / Protein: 30 mg/dL / Nitrite: POSITIVE   Leuk Esterase: NEGATIVE / RBC: 5-10 /HPF / WBC < 5 /HPF   Sq Epi: x / Non Sq Epi: 0-5 /HPF / Bacteria: Many /HPF      I&O's Summary    12 Dec 2022 07:  -  13 Dec 2022 07:00  --------------------------------------------------------  IN: 3757.8 mL / OUT: 1705 mL / NET: 2052.8 mL    13 Dec 2022 07:01  -  13 Dec 2022 13:44  --------------------------------------------------------  IN: 451.4 mL / OUT: 270 mL / NET: 181.4 mL

## 2022-12-13 NOTE — PROGRESS NOTE ADULT - ASSESSMENT
76 y/o M with Significant PMHx of IVDU found unresponsive at his nursing home, resolved after Narcan in the field, and brought to [facility]. Found to have PE, atrial flutter, and large LV thrombus on echo. Transferred to [facility] for further management. Pt C/o abdominal pain on 12/10 CTA showing mid SMA with embolus. Abnormal distal small bowel loops and cecum with dilatation and pneumatosis suggesting infarcted bowel. One or two tiny foci of  intrahepatic portal vein pneumatosis. Segmental infarction upper pole left kidney. Now s/p Exlap Embolectomy of SMA and resection of 80cm of SB pt left in discontinuity and transferred to SICU intubated. Now s/p OR for 2nd Look, 40 cm of bowel resected and left in discontinuity abdomen left open (12/13/22) , remains in SICU intubated     Neuro: Versed fentanyl  RASS-4  CV: Hypotensive on Levo + Vaso Goal MAP ( > 65)  Cont Amio gt for Aflutter s/p Dig F/u dig levels, CHF EF 15% Left LV thrombus: Cont heparin( 60-80) Hold Nipride for hypotension, ASA 150mg Supp.   Pulm: Satting well on vent 40%/520/12/5   GI: NPO NGT to LIWS. Open Abdomen  : Uriarte Infarction of upper pole of Left Kidney, Renal US negative on 12/12.   ID: Zosyn ( 12/11-) Hep C Positive. D/c: Vanco  ( 12/11-12/12)   Endo: ISS  PPx: SCD Heparin GTT (for a-flutter)   Lines: Rt TLC ( 12/11-)  Wounds: Midline open abdomen  PT/OT: not ordered Strict bedrest  Dispo: Pending return to OR 12/14 or 12/15 for abdomen closure.

## 2022-12-13 NOTE — PROGRESS NOTE ADULT - ATTENDING COMMENTS
76 YO M with a history of cocaine/opiate abuse and resident of a nursing facility who was brought to [facility] with unresponsiveness that was alleviated with narcan and found to be in rapid atrial flutter with severe LV dysfunction with LV hrombus and subsegemental PE prompting transfer to [facility] on 12/7. He was also found to have limb ischemia with arterial thrombosis of LLE vessels. He was being considered for rhythm control when he developed abdominal pain and found to have acute mesenteric ischemia and underwent emergent bowel resection and SMA thrombectomy. His LV thrombus us no longer apparent on TTE and has likely embolized.     He is currently intubated with an open abdomen. From a HF perspective, he is euvolemic with CVP's in low single digits and central venous saturations suggest normal cardiac output. Prognosis is guarded.    REVIEW OF STUDIES  TTE: LV 5.0 cm, LVEF 10-15% with global hypokinesis, 2.5 cm mobile LV thrombus, severe RV dysfunction  LORENZO: no ISIAH thrombus     PLAN  # Acute systolic heart failure  - Wean levophed as tolerates, from a HF perspective would prefer MAPs 65-75  - Agree with vasopressin  - Euvolemic off diuretics, OK to bolus 500 cc NS. Continue CVP monitoring and target CVP 8  - Continue to follow daily central venous saturations, no signs of low cardiac output at this time that would necessitate inotropes   - Etiology is possibly tachycardia induced. not a candidate for rhythm control at this time    # Atrial flutter  - EP following  - Continue heparin drip  - Rates controlled at this time   - Possible candidate for DCCV pending resolution of acute surgical issues    # Acute limb ischemia and mesenteric ischemia from LV thrombus  - management per surgery/vascular  - continue anticoagulation    # GOLDIE  - likely post-op ATN and renal infarction, resolved

## 2022-12-13 NOTE — PROGRESS NOTE ADULT - ASSESSMENT
78 y/o M with no significant PMHx found unresponsive at his nursing home, resolved after narcan in the field, and brought to [facility]. Found to have LV dysfunction (Tachy mediated), sub-segmental PE, atrial flutter, and large LV thrombus on echo. Transferred to [facility] for further management. Course complicated by acute mesenteric ischemia now s/p embolectomy and bowel resection, septic shock, LLE arterial occlusion.      #HFrEF   EF 10-15%, LVIDD 5. Severely reduced RV function.   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   Diuretics: none; goal CVP 8  GDMT: holding while on pressors; was on nipride gtt at 1.5 off since 12/11   - daily mixed venous labs (using as a surrogate to calculate CO/CI)  - PRN IVF (250 cc boluses) to maintain CVP ~8  - wean levophed and vasopressin     #Atrial Flutter   - c/w amio gtt  - c/w  heparin gtt     #LV thrombus   LV thrombus not visualized on repeat bedside limited echo, possible that previously visualized LV thrombus embolized to bowel     #Acute mesenteric ischemia   Now s/p Exlap Embolectomy of SMA and resection of 80cm of SB  open abdomen  - further management per SICU team

## 2022-12-13 NOTE — BRIEF OPERATIVE NOTE - OPERATION/FINDINGS
Operation: Planned 2nd look operation for acute mesenteric ischemia.  Indication: Acute Mesenteric Ischemia    Operative Details: Patient was brought into the OR for planned 2nd book. Abdomen was already open from 1st look with Abthera VAC in place. The entirety of the small bowel and large bowel was meticulously run to assess for bowel ischemia and viability. Ruler was used to measure entirety of small bowel and measured to be 340cm. Of note, SMA noted to have strong palpable pulse at the root of the mesentery. A portion of the distal small bowel was noted to have significant duskiness and was thick and edematous. A total of 40cm of small bowel was resected using EndoGIA purple load stapler and was left in discontinuity for planned 3rd look operation. Patient was left intubated and brought back to SICU in stable condition.

## 2022-12-13 NOTE — PROGRESS NOTE ADULT - SUBJECTIVE AND OBJECTIVE BOX
General Surgery Progress Note    SUBJECTIVE:   Patient seen and examined at bedside by chief during AM rounds. Overnight, vent noted to be turned off inadvertently and patient had to be manually ventilated for 60 seconds with no subsequent change in ABG. Patient still intubated, sedated and on pressors. Vital signs reviewed and are stable.    Vital Signs Last 24 Hrs  T(C): 37 (13 Dec 2022 08:51), Max: 37 (13 Dec 2022 06:00)  T(F): 98.6 (13 Dec 2022 06:00), Max: 98.6 (13 Dec 2022 06:00)  HR: 86 (13 Dec 2022 08:51) (57 - 108)  BP: 103/67 (13 Dec 2022 08:51) (103/67 - 135/89)  BP(mean): 81 (13 Dec 2022 08:51) (81 - 108)  RR: 17 (13 Dec 2022 08:51) (14 - 39)  SpO2: 99% (13 Dec 2022 08:51) (82% - 100%)    Parameters below as of 13 Dec 2022 08:51      O2 Concentration (%): 70    I&O's Summary    12 Dec 2022 07:01  -  13 Dec 2022 07:00  --------------------------------------------------------  IN: 3757.8 mL / OUT: 1705 mL / NET: 2052.8 mL    13 Dec 2022 07:01  -  13 Dec 2022 09:41  --------------------------------------------------------  IN: 163.2 mL / OUT: 55 mL / NET: 108.2 mL        Physical Exam:  General: Resting comfortably in bed, NAD, sedated  HEENT: ETT in place,   Pulmonary: Mechanical ventilation, no respiratory distress, saturating well  Cardiovascular: Irregular rhythm  Abdomen: Soft, nondistended, midline prevena in place to suction with sanguinous output and sanguinous soaking of sponge  Extremities: Feet cold to touch, no edema appreciated    LABS:                        11.0   11.41 )-----------( 260      ( 13 Dec 2022 05:30 )             33.6     12-    132<L>  |  101  |  28<H>  ----------------------------<  121<H>  4.2   |  20<L>  |  0.96    Ca    7.7<L>      13 Dec 2022 05:30  Phos  2.8     12  Mg     2.5         TPro  5.5<L>  /  Alb  2.0<L>  /  TBili  1.3<H>  /  DBili  0.9<H>  /  AST  18  /  ALT  15  /  AlkPhos  62  12-13    PT/INR - ( 13 Dec 2022 05:30 )   PT: 27.4 sec;   INR: 2.28          PTT - ( 13 Dec 2022 05:30 )  PTT:52.1 sec  Urinalysis Basic - ( 11 Dec 2022 17:48 )    Color: Orange / Appearance: Clear / S.025 / pH: x  Gluc: x / Ketone: Trace mg/dL  / Bili: Moderate / Urobili: 1.0 E.U./dL   Blood: x / Protein: 30 mg/dL / Nitrite: POSITIVE   Leuk Esterase: NEGATIVE / RBC: 5-10 /HPF / WBC < 5 /HPF   Sq Epi: x / Non Sq Epi: 0-5 /HPF / Bacteria: Many /HP

## 2022-12-13 NOTE — PROGRESS NOTE ADULT - ASSESSMENT
77 year old male with no documented past medical history presenting from [facility] after being found unresponsive at his nursing home s/p narcan with resolution. Found to have atrial flutter. Echo showed severely reduced LV function with an LV thrombus. Heparin gtt was started and pt transferred to [facility] for LORENZO and possible ablation. Vascular consulted for cold leg. Bilateral Duplex (12/8) showed acute thrombus completely occluding the left popliteal artery and acute thrombus incompletely occluding the left dorsalis pedis artery and likely acute thrombus completely occluding the left mid superficial femoral artery and extending to the left popliteal artery. Left DP with absent signals. Patient noted to have acute abdominal pain overnight with bloody diarrhea c/f acute mesenteric ischemia and taken emergently to the OR. Now POD1 s/p ex lap, SMA embolectomy, and SBR (80 cm) with wound left open (abthera in place) with plans for second look today. Mildly increased pressor requirements since yesterday.     Plan:   - Recommend continued hep gtt to goal of PTT    - Wean pressors per SICU   - Rest of care per SICU  - Vascular surgery Team 3C will continue to follow. Please call [phone] with any questions or concerns.

## 2022-12-13 NOTE — PRE-ANESTHESIA EVALUATION ADULT - NSANTHPMHFT_GEN_ALL_CORE
77 year old male with no documented past medical history presenting from [facility] after being found unresponsive at his nursing home s/p narcan with resolution. Found to have atrial flutter. Echo showed severely reduced LV function with an LV thrombus. Heparin gtt was started and pt transferred to [facility] for LORENZO and possible ablation. Vascular consulted for cold leg. Bilateral Duplex (12/8) showed acute thrombus completely occluding the left popliteal artery and acute thrombus incompletely occluding the left dorsalis pedis artery and likely acute thrombus completely occluding the left mid superficial femoral artery and extending to the left popliteal artery. Left DP with absent signals. Patient noted to have acute abdominal pain overnight with bloody diarrhea c/f acute mesenteric ischemia and taken emergently to the OR. Now POD1 s/p ex lap, SMA embolectomy, and SBR (80 cm) with wound left open (abthera in place) with plans for second look today. Mildly increased pressor requirements since yesterday.

## 2022-12-13 NOTE — PROGRESS NOTE ADULT - SUBJECTIVE AND OBJECTIVE BOX
INTERVAL/OVERNIGHT EVENTS: Issues with vent, patient bagged for ~ 60 sec. Resolved. Checked CXR given ETT at 27cm (ok) and ABG given poor SpO2 signal (ok). PTT 80.5 --> No chg to hep gtt. Plan for OR today pending rpt COVID    SUBJECTIVE: Patient seen at bedside intubated and sedated on pressors.       Neurologic Medications  acetaminophen   IVPB .. 1000 milliGRAM(s) IV Intermittent once  aspirin Suppository 150 milliGRAM(s) Rectal daily  fentaNYL   Infusion. 0.5 MICROgram(s)/kG/Hr IV Continuous <Continuous>  midazolam Infusion 0.02 mG/kG/Hr IV Continuous <Continuous>    Respiratory Medications  albuterol/ipratropium for Nebulization 3 milliLiter(s) Nebulizer every 6 hours    Cardiovascular Medications  aMIOdarone Infusion 0.5 mG/Min IV Continuous <Continuous>  norepinephrine Infusion 0.05 MICROgram(s)/kG/Min IV Continuous <Continuous>    Gastrointestinal Medications  dextrose 5%. 1000 milliLiter(s) IV Continuous <Continuous>  dextrose 5%. 1000 milliLiter(s) IV Continuous <Continuous>  pantoprazole  Injectable 40 milliGRAM(s) IV Push two times a day  sodium chloride 0.9%. 1000 milliLiter(s) IV Continuous <Continuous>    Genitourinary Medications    Hematologic/Oncologic Medications  heparin  Infusion 800 Unit(s)/Hr IV Continuous <Continuous>  influenza  Vaccine (HIGH DOSE) 0.7 milliLiter(s) IntraMuscular once    Antimicrobial/Immunologic Medications  piperacillin/tazobactam IVPB.. 4.5 Gram(s) IV Intermittent every 8 hours    Endocrine/Metabolic Medications  dextrose 50% Injectable 25 Gram(s) IV Push once  dextrose 50% Injectable 12.5 Gram(s) IV Push once  dextrose 50% Injectable 25 Gram(s) IV Push once  dextrose Oral Gel 15 Gram(s) Oral once PRN Blood Glucose LESS THAN 70 milliGRAM(s)/deciliter  glucagon  Injectable 1 milliGRAM(s) IntraMuscular once  insulin lispro (ADMELOG) corrective regimen sliding scale   SubCutaneous every 6 hours  vasopressin Infusion 0.04 Unit(s)/Min IV Continuous <Continuous>    Topical/Other Medications  chlorhexidine 0.12% Liquid 15 milliLiter(s) Oral Mucosa every 12 hours  chlorhexidine 2% Cloths 1 Application(s) Topical <User Schedule>      MEDICATIONS  (PRN):  dextrose Oral Gel 15 Gram(s) Oral once PRN Blood Glucose LESS THAN 70 milliGRAM(s)/deciliter      I&O's Detail    12 Dec 2022 07:01  -  13 Dec 2022 07:00  --------------------------------------------------------  IN:    Amiodarone: 400.8 mL    FentaNYL: 228.4 mL    Heparin: 24 mL    Heparin: 217.5 mL    Heparin: 7.5 mL    IV PiggyBack: 100 mL    Midazolam: 81.6 mL    Norepinephrine: 254 mL    sodium chloride 0.9%: 1800 mL    Sodium Chloride 0.9% Bolus: 500 mL    Vasopressin: 144 mL  Total IN: 3757.8 mL    OUT:    Nasogastric/Oral tube (mL): 0 mL    VAC (Vacuum Assisted Closure) System (mL): 800 mL    Voided (mL): 905 mL  Total OUT: 1705 mL    Total NET: 2052.8 mL      13 Dec 2022 07:01  -  13 Dec 2022 12:22  --------------------------------------------------------  IN:    Amiodarone: 33.4 mL    FentaNYL: 18 mL    Heparin: 15 mL    IV PiggyBack: 125 mL    Midazolam: 8 mL    Norepinephrine: 40 mL    sodium chloride 0.9%: 200 mL    Vasopressin: 12 mL  Total IN: 451.4 mL    OUT:    Voided (mL): 270 mL  Total OUT: 270 mL    Total NET: 181.4 mL          Vital Signs Last 24 Hrs  T(C): 37.4 (13 Dec 2022 10:00), Max: 37.4 (13 Dec 2022 10:00)  T(F): 99.3 (13 Dec 2022 10:00), Max: 99.3 (13 Dec 2022 10:00)  HR: 112 (13 Dec 2022 11:57) (57 - 112)  BP: 136/88 (13 Dec 2022 11:40) (103/67 - 136/88)  BP(mean): 107 (13 Dec 2022 11:40) (81 - 108)  RR: 15 (13 Dec 2022 11:40) (14 - 39)  SpO2: 100% (13 Dec 2022 11:57) (82% - 100%)    Parameters below as of 13 Dec 2022 11:57  Patient On (Oxygen Delivery Method): ventilator    O2 Concentration (%): 70    Gen: NAD   Neuro: Sedated intubated  HEENT: ETT in place OGT in place draining minimal brown fluid  CV: RRR reg s1s2 no M  Pulm: CTA B/L no w/w/r  Abd: Soft, Midline open abdomen with abthera wound vac in place  : Uriarte in place draining straw colored urine  Ext: No C/C/E Extremities Cool to touch. No dopplerable pulse in the Left Lext + Rt PT.   Skin: No rashes erythema or ecchymosis  MSK: No joint swelling noted  Psych: unable to assess    LABS:                        10.8   9.97  )-----------( 236      ( 13 Dec 2022 12:10 )             33.5     12-13    132<L>  |  101  |  28<H>  ----------------------------<  121<H>  4.2   |  20<L>  |  0.96    Ca    7.7<L>      13 Dec 2022 05:30  Phos  2.8     12-13  Mg     2.5     12-13    TPro  5.5<L>  /  Alb  2.0<L>  /  TBili  1.3<H>  /  DBili  0.9<H>  /  AST  18  /  ALT  15  /  AlkPhos  62  12-13    PT/INR - ( 13 Dec 2022 05:30 )   PT: 27.4 sec;   INR: 2.28          PTT - ( 13 Dec 2022 05:30 )  PTT:52.1 sec  Urinalysis Basic - ( 11 Dec 2022 17:48 )    Color: Orange / Appearance: Clear / S.025 / pH: x  Gluc: x / Ketone: Trace mg/dL  / Bili: Moderate / Urobili: 1.0 E.U./dL   Blood: x / Protein: 30 mg/dL / Nitrite: POSITIVE   Leuk Esterase: NEGATIVE / RBC: 5-10 /HPF / WBC < 5 /HPF   Sq Epi: x / Non Sq Epi: 0-5 /HPF / Bacteria: Many /HPF        RADIOLOGY & ADDITIONAL STUDIES:      Culture - Blood (collected 22 @ 13:13)  Source: .Blood Blood-Peripheral  Preliminary Report (22 @ 14:00):    No growth at 1 day.    Culture - Blood (collected 22 @ 13:13)  Source: .Blood Blood-Peripheral  Preliminary Report (22 @ 14:00):    No growth at 1 day.

## 2022-12-13 NOTE — PROGRESS NOTE ADULT - ATTENDING COMMENTS
I agree with the fellow's findings and plans as written above with the following additions/amendments:    Seen and examined at bedside. Worsening pressor support, for repeat OR, otherwise no issues. sCr stable. Further recs as above

## 2022-12-13 NOTE — PROGRESS NOTE ADULT - SUBJECTIVE AND OBJECTIVE BOX
Patient is a 77y Male seen and evaluated at bedside. overnight vent became disconnected and patient needed to be subsequently bagged - patient started on levophed and vasopressin for pressor support overnight- going back to the OR today for further abdomen exploration- /88 Na 132 bicarb 20 Cr 0.96 uop 1.7L/24 hours        Meds:    acetaminophen   IVPB .. 1000 once  albuterol/ipratropium for Nebulization 3 every 6 hours  aMIOdarone Infusion 0.5 <Continuous>  aspirin Suppository 150 daily  chlorhexidine 0.12% Liquid 15 every 12 hours  chlorhexidine 2% Cloths 1 <User Schedule>  dextrose 5%. 1000 <Continuous>  dextrose 5%. 1000 <Continuous>  dextrose 50% Injectable 25 once  dextrose 50% Injectable 12.5 once  dextrose 50% Injectable 25 once  dextrose Oral Gel 15 once PRN  fentaNYL   Infusion. 0.5 <Continuous>  glucagon  Injectable 1 once  heparin  Infusion 800 <Continuous>  influenza  Vaccine (HIGH DOSE) 0.7 once  insulin lispro (ADMELOG) corrective regimen sliding scale  every 6 hours  midazolam Infusion 0.02 <Continuous>  norepinephrine Infusion 0.05 <Continuous>  pantoprazole  Injectable 40 two times a day  piperacillin/tazobactam IVPB.. 4.5 every 8 hours  sodium chloride 0.9%. 1000 <Continuous>  vasopressin Infusion 0.04 <Continuous>      T(C): , Max: 37.4 (22 @ 10:00)  T(F): , Max: 99.3 (22 @ 10:00)  HR: 112 (22 @ 11:57)  BP: 136/88 (22 @ 11:40)  BP(mean): 107 (22 @ 11:40)  RR: 15 (22 @ 11:40)  SpO2: 100% (22 @ 11:57)  Wt(kg): --     @ 07:01  -   @ 07:00  --------------------------------------------------------  IN: 3757.8 mL / OUT: 1705 mL / NET: 2052.8 mL     @ 07:01  -   @ 12:02  --------------------------------------------------------  IN: 451.4 mL / OUT: 120 mL / NET: 331.4 mL      Height (cm): 198.1 ( @ 08:51)  Weight (kg): 64.4 ( @ 08:51)  BMI (kg/m2): 16.4 ( @ 08:51)  BSA (m2): 1.95 ( @ 08:51)    Review of Systems:  unable to participate      PHYSICAL EXAM:  GENERAL: intubated; sedated   CHEST/LUNG: mechanical breath sounds BL; no accessory muscle use   HEART: normal S1S2, RRR  ABDOMEN: Soft, Nontender, +BS, No flank tenderness bilateral  EXTREMITIES: No clubbing, cyanosis, or edema   NEUROLOGY: intubated; sedated         LABS:                        11.0   11.41 )-----------( 260      ( 13 Dec 2022 05:30 )             33.6         132<L>  |  101  |  28<H>  ----------------------------<  121<H>  4.2   |  20<L>  |  0.96    Ca    7.7<L>      13 Dec 2022 05:30  Phos  2.8       Mg     2.5         TPro  5.5<L>  /  Alb  2.0<L>  /  TBili  1.3<H>  /  DBili  0.9<H>  /  AST  18  /  ALT  15  /  AlkPhos  62        PT/INR - ( 13 Dec 2022 05:30 )   PT: 27.4 sec;   INR: 2.28          PTT - ( 13 Dec 2022 05:30 )  PTT:52.1 sec  Urinalysis Basic - ( 11 Dec 2022 17:48 )    Color: Orange / Appearance: Clear / S.025 / pH: x  Gluc: x / Ketone: Trace mg/dL  / Bili: Moderate / Urobili: 1.0 E.U./dL   Blood: x / Protein: 30 mg/dL / Nitrite: POSITIVE   Leuk Esterase: NEGATIVE / RBC: 5-10 /HPF / WBC < 5 /HPF   Sq Epi: x / Non Sq Epi: 0-5 /HPF / Bacteria: Many /HPF      Creatinine, Random Urine: 120 mg/dL ( @ 16:14)  Protein/Creatinine Ratio Calculation: 1.2 Ratio ( @ 16:14)  Sodium, Random Urine: 20 mmol/L ( @ 06:54)  Creatinine, Random Urine: 169 mg/dL ( @ 06:54)  Creatinine, Random Urine: 152 mg/dL ( @ 17:48)  Sodium, Random Urine: 20 mmol/L ( @ 17:48)        RADIOLOGY & ADDITIONAL STUDIES:           Patient is a 77y Male seen and evaluated at bedside. overnight vent became disconnected and patient needed to be subsequently bagged - patient started on levophed and vasopressin for pressor support overnight- going back to the OR today for further abdomen exploration- /88 Na 132 bicarb 20 Cr 0.96 uop 1.7L/24 hours        Meds:    acetaminophen   IVPB .. 1000 once  albuterol/ipratropium for Nebulization 3 every 6 hours  aMIOdarone Infusion 0.5 <Continuous>  aspirin Suppository 150 daily  chlorhexidine 0.12% Liquid 15 every 12 hours  chlorhexidine 2% Cloths 1 <User Schedule>  dextrose 5%. 1000 <Continuous>  dextrose 5%. 1000 <Continuous>  dextrose 50% Injectable 25 once  dextrose 50% Injectable 12.5 once  dextrose 50% Injectable 25 once  dextrose Oral Gel 15 once PRN  fentaNYL   Infusion. 0.5 <Continuous>  glucagon  Injectable 1 once  heparin  Infusion 800 <Continuous>  influenza  Vaccine (HIGH DOSE) 0.7 once  insulin lispro (ADMELOG) corrective regimen sliding scale  every 6 hours  midazolam Infusion 0.02 <Continuous>  norepinephrine Infusion 0.05 <Continuous>  pantoprazole  Injectable 40 two times a day  piperacillin/tazobactam IVPB.. 4.5 every 8 hours  sodium chloride 0.9%. 1000 <Continuous>  vasopressin Infusion 0.04 <Continuous>      T(C): , Max: 37.4 (22 @ 10:00)  T(F): , Max: 99.3 (22 @ 10:00)  HR: 112 (22 @ 11:57)  BP: 136/88 (22 @ 11:40)  BP(mean): 107 (22 @ 11:40)  RR: 15 (22 @ 11:40)  SpO2: 100% (22 @ 11:57)  Wt(kg): --     @ 07:01  -   @ 07:00  --------------------------------------------------------  IN: 3757.8 mL / OUT: 1705 mL / NET: 2052.8 mL     @ 07:01  -   @ 12:02  --------------------------------------------------------  IN: 451.4 mL / OUT: 120 mL / NET: 331.4 mL      Height (cm): 198.1 ( @ 08:51)  Weight (kg): 64.4 ( @ 08:51)  BMI (kg/m2): 16.4 ( @ 08:51)  BSA (m2): 1.95 ( @ 08:51)    Review of Systems:  unable to participate      PHYSICAL EXAM:  GENERAL: intubated; sedated   CHEST/LUNG: mechanical breath sounds BL; no accessory muscle use   HEART: normal S1S2, RRR  ABDOMEN: Soft, Nontender, +BS, No flank tenderness bilateral  EXTREMITIES: No clubbing, cyanosis, or edema   NEUROLOGY: intubated; sedated         LABS:                        11.0   11.41 )-----------( 260      ( 13 Dec 2022 05:30 )             33.6         132<L>  |  101  |  28<H>  ----------------------------<  121<H>  4.2   |  20<L>  |  0.96    Ca    7.7<L>      13 Dec 2022 05:30  Phos  2.8       Mg     2.5         TPro  5.5<L>  /  Alb  2.0<L>  /  TBili  1.3<H>  /  DBili  0.9<H>  /  AST  18  /  ALT  15  /  AlkPhos  62        PT/INR - ( 13 Dec 2022 05:30 )   PT: 27.4 sec;   INR: 2.28          PTT - ( 13 Dec 2022 05:30 )  PTT:52.1 sec  Urinalysis Basic - ( 11 Dec 2022 17:48 )    Color: Orange / Appearance: Clear / S.025 / pH: x  Gluc: x / Ketone: Trace mg/dL  / Bili: Moderate / Urobili: 1.0 E.U./dL   Blood: x / Protein: 30 mg/dL / Nitrite: POSITIVE   Leuk Esterase: NEGATIVE / RBC: 5-10 /HPF / WBC < 5 /HPF   Sq Epi: x / Non Sq Epi: 0-5 /HPF / Bacteria: Many /HPF      Creatinine, Random Urine: 120 mg/dL ( @ 16:14)  Protein/Creatinine Ratio Calculation: 1.2 Ratio ( @ 16:14)  Sodium, Random Urine: 20 mmol/L ( @ 06:54)  Creatinine, Random Urine: 169 mg/dL ( @ 06:54)  Creatinine, Random Urine: 152 mg/dL ( @ 17:48)  Sodium, Random Urine: 20 mmol/L ( @ 17:48)        RADIOLOGY & ADDITIONAL STUDIES:    Creatinine, Serum: 0.82 mg/dL (22 @ 12:10)  Creatinine, Serum: 0.96 mg/dL (22 @ 05:30)  Creatinine, Serum: 1.27 mg/dL (22 @ 03:05)  Creatinine, Serum: 1.18 mg/dL (22 @ 16:23)  Creatinine, Serum: 0.68 mg/dL (22 @ 08:24)  Creatinine, Serum: 0.78 mg/dL (22 @ 03:44)  Creatinine, Serum: 0.68 mg/dL (22 @ 00:38)  Creatinine, Serum: 0.71 mg/dL (12-10-22 @ 05:58)  Creatinine, Serum: 0.81 mg/dL (12-10-22 @ 02:16)  Creatinine, Serum: 0.85 mg/dL (22 @ 18:54)

## 2022-12-13 NOTE — PROGRESS NOTE ADULT - ASSESSMENT
Patient is a 76 y/o male with no significant past medical history BIBA as patient was found down at his nursing home and initially taken to OSH where he was found to have RUL segmental PE and transferred to CCU where he was found to have an LV thrombus ; hospital course c/b acute mesenteric ischemia. Nephrology consulted for elevated creatinine.     Assessment/Plan:   #non-oliguric GOLDIE on CKD likely of pre-renal etiology i/s/o ongoing insensible losses and intravascular depletion  FeNA suggestive of pre-renal etiology  improving w/ IVF administration    Recommend:   would c/w IVF at this time as patient still appears dry on exam  c/w pressor support to maintain MAP >70 for adequate renal perfusion  daily BMP   strict I/Os   renal diet   maintain MAP >70    Hyponatremia- improving   i/s/o ARF  will continue to monitor   make sure drips are in NS and not D5W    Metabolic Acidosis  i/s/o ARF  improving; will continue to monitor   Thank you for the opportunity to participate in the care of your patient. The nephrology service remains available to assist with any questions or concerns. Please feel free to reach us by paging the on-call nephrology fellow for urgent issues or as below.     Aura Gan D.O  PGY-5, Nephrology Fellow   P: [phone]

## 2022-12-13 NOTE — PROGRESS NOTE ADULT - ASSESSMENT
76yo M with no significant PMH/PSx admitted to cardiology service on 12/7 in the setting of severely reduced LV function 2/2 an LV thrombus. Pt now with several days of post-prandial abdominal pain and loose BMs, initially suspected to be 2/2 opioid withdrawal, however on evening of 12/10 had a large bloody BM. 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 Vascular surgery (already following) with plan for OR. General surgery consulted for possible operative assistance in the setting of bowel ischemia. Now POD2 s/p ex lap, SMA embolectomy and small bowel resection. Plan to RTOR today for second look.    Recommendations:  - OR today for second look  - Hold heparin gtt prior to the operating room  - Titrate pressors per SICU  - Continue vancomycin and zosyn  - rest of care per SICU

## 2022-12-14 PROBLEM — Z00.00 ENCOUNTER FOR PREVENTIVE HEALTH EXAMINATION: Status: ACTIVE | Noted: 2022-01-01

## 2022-12-14 NOTE — PROGRESS NOTE ADULT - ASSESSMENT
Patient is a 78 y/o male with no significant past medical history BIBA as patient was found down at his nursing home and initially taken to OSH where he was found to have RUL segmental PE and transferred to CCU where he was found to have an LV thrombus ; hospital course c/b acute mesenteric ischemia. Nephrology consulted for elevated creatinine.     Assessment/Plan:   #non-oliguric GOLDIE on CKD likely of pre-renal etiology i/s/o ongoing insensible losses and intravascular depletion  FeNA suggestive of pre-renal etiology  improving w/ IVF administration    Recommend:   would give 500cc bolus of NS   c/w pressor support to maintain MAP >70 for adequate renal perfusion  daily BMP   strict I/Os   renal diet   maintain MAP >70    Hyponatremia-resolved      Metabolic Acidosis-resolved    Thank you for the opportunity to participate in the care of your patient. The nephrology service remains available to assist with any questions or concerns. Please feel free to reach us by paging the on-call nephrology fellow for urgent issues or as below.     Aura Gan D.O  PGY-5, Nephrology Fellow   P: [phone] Patient is a 76 y/o male with no significant past medical history BIBA as patient was found down at his nursing home and initially taken to OSH where he was found to have RUL segmental PE and transferred to CCU where he was found to have an LV thrombus ; hospital course c/b acute mesenteric ischemia. Nephrology consulted for elevated creatinine.     Assessment/Plan:   #non-oliguric GOLDIE on CKD likely of pre-renal etiology i/s/o ongoing insensible losses and intravascular depletion  FeNA suggestive of pre-renal etiology  improving w/ IVF administration    Recommend:   would give 500cc bolus of NS   c/w pressor support to maintain MAP >70 for adequate renal perfusion  daily BMP   strict I/Os   renal diet   maintain MAP >70    Hyponatremia-resolved    Metabolic Acidosis-resolved    Thank you for the opportunity to participate in the care of your patient. The nephrology service remains available to assist with any questions or concerns. Please feel free to reach us by paging the on-call nephrology fellow for urgent issues or as below.     Aura Gan D.O  PGY-5, Nephrology Fellow   P: [phone]

## 2022-12-14 NOTE — PROGRESS NOTE ADULT - ASSESSMENT
78yo M with no significant PMH/PSx admitted to cardiology service on 12/7 in the setting of severely reduced LV function 2/2 an LV thrombus. Pt now with several days of post-prandial abdominal pain and loose BMs, initially suspected to be 2/2 opioid withdrawal, however on evening of 12/10 had a large bloody BM. 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 Vascular surgery (already following) with plan for OR. General surgery consulted for possible operative assistance in the setting of bowel ischemia. Now POD2 s/p ex lap, SMA embolectomy and small bowel resection. Plan to RTOR today for second look.    Recommendations:  - Ween pressors as tolerated  - Continue vancomycin and zosyn  - Monitor OLGA and vac output  - Plan RTOR timing   - rest of care per SICU

## 2022-12-14 NOTE — PROGRESS NOTE ADULT - SUBJECTIVE AND OBJECTIVE BOX
SUBJECTIVE: Pt seen and examined at bedside this am. Sedated, off pressors, open abdomen    MEDICATIONS  (STANDING):  acetaminophen   IVPB .. 1000 milliGRAM(s) IV Intermittent once  albuterol/ipratropium for Nebulization 3 milliLiter(s) Nebulizer every 6 hours  aMIOdarone Infusion 0.5 mG/Min (16.7 mL/Hr) IV Continuous <Continuous>  aMIOdarone Infusion 0.501 mG/Min (16.7 mL/Hr) IV Continuous <Continuous>  aspirin Suppository 150 milliGRAM(s) Rectal daily  chlorhexidine 0.12% Liquid 15 milliLiter(s) Oral Mucosa every 12 hours  chlorhexidine 2% Cloths 1 Application(s) Topical <User Schedule>  dextrose 5%. 1000 milliLiter(s) (50 mL/Hr) IV Continuous <Continuous>  dextrose 5%. 1000 milliLiter(s) (100 mL/Hr) IV Continuous <Continuous>  dextrose 50% Injectable 25 Gram(s) IV Push once  dextrose 50% Injectable 12.5 Gram(s) IV Push once  dextrose 50% Injectable 25 Gram(s) IV Push once  fentaNYL   Infusion. 0.5 MICROgram(s)/kG/Hr (3.22 mL/Hr) IV Continuous <Continuous>  glucagon  Injectable 1 milliGRAM(s) IntraMuscular once  heparin  Infusion 850 Unit(s)/Hr (8.5 mL/Hr) IV Continuous <Continuous>  influenza  Vaccine (HIGH DOSE) 0.7 milliLiter(s) IntraMuscular once  insulin lispro (ADMELOG) corrective regimen sliding scale   SubCutaneous every 6 hours  midazolam Infusion 0.02 mG/kG/Hr (1.29 mL/Hr) IV Continuous <Continuous>  norepinephrine Infusion 0.05 MICROgram(s)/kG/Min (6.04 mL/Hr) IV Continuous <Continuous>  pantoprazole  Injectable 40 milliGRAM(s) IV Push two times a day  piperacillin/tazobactam IVPB.. 4.5 Gram(s) IV Intermittent every 8 hours  sodium chloride 0.9%. 1000 milliLiter(s) (100 mL/Hr) IV Continuous <Continuous>  vasopressin Infusion 0.04 Unit(s)/Min (6 mL/Hr) IV Continuous <Continuous>    MEDICATIONS  (PRN):  dextrose Oral Gel 15 Gram(s) Oral once PRN Blood Glucose LESS THAN 70 milliGRAM(s)/deciliter      Vital Signs Last 24 Hrs  T(C): 35.8 (14 Dec 2022 12:00), Max: 36.8 (13 Dec 2022 18:15)  T(F): 96.5 (14 Dec 2022 12:00), Max: 98.2 (13 Dec 2022 18:15)  HR: 107 (14 Dec 2022 12:00) (53 - 109)  BP: 95/65 (14 Dec 2022 11:00) (95/65 - 133/71)  BP(mean): 75 (14 Dec 2022 11:00) (75 - 100)  RR: 14 (14 Dec 2022 12:00) (12 - 16)  SpO2: 100% (14 Dec 2022 12:00) (100% - 100%)    Parameters below as of 14 Dec 2022 11:54  Patient On (Oxygen Delivery Method): ventilator    O2 Concentration (%): 40    Physical Exam  General: NAD, sedated in bed  Pulmonary: Nonlabored breathing, intubated  CV: NSR  Abd: soft, abthera in place and connected to wound VAC  Extremities: (-) edema, warm, well-perfused, no L DP/PT signals, R PT mono, no R DP      I&O's Detail    13 Dec 2022 07:01  -  14 Dec 2022 07:00  --------------------------------------------------------  IN:    Amiodarone: 133.6 mL    Amiodarone: 167 mL    FentaNYL: 198 mL    Heparin: 79 mL    Heparin: 102 mL    IV PiggyBack: 225 mL    IV PiggyBack: 100 mL    IV PiggyBack: 125 mL    Midazolam: 88 mL    Norepinephrine: 160.9 mL    sodium chloride 0.9%: 2200 mL    Vasopressin: 90 mL  Total IN: 3668.5 mL    OUT:    Indwelling Catheter - Urethral (mL): 500 mL    Nasogastric/Oral tube (mL): 50 mL    VAC (Vacuum Assisted Closure) System (mL): 450 mL    Voided (mL): 550 mL  Total OUT: 1550 mL    Total NET: 2118.5 mL      14 Dec 2022 07:01  -  14 Dec 2022 12:46  --------------------------------------------------------  IN:    FentaNYL: 36 mL    Heparin: 34 mL    IV PiggyBack: 100 mL    IV PiggyBack: 25 mL    Midazolam: 16 mL    sodium chloride 0.9%: 400 mL  Total IN: 611 mL    OUT:    Amiodarone: 0 mL    Indwelling Catheter - Urethral (mL): 580 mL    Norepinephrine: 0 mL    Vasopressin: 0 mL  Total OUT: 580 mL    Total NET: 31 mL          LABS:                        9.6    7.94  )-----------( 206      ( 14 Dec 2022 05:30 )             28.9     12-14    135  |  105  |  19  ----------------------------<  109<H>  3.7   |  23  |  0.70    Ca    7.6<L>      14 Dec 2022 05:30  Phos  1.8     12-14  Mg     2.5     12-14    TPro  5.0<L>  /  Alb  2.2<L>  /  TBili  1.2  /  DBili  0.9<H>  /  AST  22  /  ALT  14  /  AlkPhos  58  12-14    PT/INR - ( 14 Dec 2022 05:30 )   PT: 58.8 sec;   INR: 4.86          PTT - ( 14 Dec 2022 11:54 )  PTT:63.9 sec      RADIOLOGY & ADDITIONAL STUDIES:

## 2022-12-14 NOTE — PROGRESS NOTE ADULT - SUBJECTIVE AND OBJECTIVE BOX
General Surgery Progress Note    SUBJECTIVE:   Patient seen and examined at bedside by amairani during AM rounds.    Vital Signs Last 24 Hrs  T(C): 35.8 (14 Dec 2022 12:00), Max: 36.8 (13 Dec 2022 18:15)  T(F): 96.5 (14 Dec 2022 12:00), Max: 98.2 (13 Dec 2022 18:15)  HR: 106 (14 Dec 2022 15:00) (53 - 110)  BP: 118/71 (14 Dec 2022 15:00) (95/65 - 118/71)  BP(mean): 87 (14 Dec 2022 15:00) (75 - 88)  RR: 12 (14 Dec 2022 15:00) (12 - 16)  SpO2: 100% (14 Dec 2022 15:00) (100% - 100%)    Parameters below as of 14 Dec 2022 15:00  Patient On (Oxygen Delivery Method): ventilator    O2 Concentration (%): 40    I&O's Summary    13 Dec 2022 07:01  -  14 Dec 2022 07:00  --------------------------------------------------------  IN: 3668.5 mL / OUT: 1550 mL / NET: 2118.5 mL    14 Dec 2022 07:01  -  14 Dec 2022 16:02  --------------------------------------------------------  IN: 1161.8 mL / OUT: 1495 mL / NET: -333.2 mL        Physical Exam:  General: Resting comfortably in bed, NAD  HEENT: ATNC  Pulmonary: Nonlabored breathing, no respiratory distress, no acessory muscle use noted  Cardiovascular: NSR  Abdomen: Soft, nondisteded, appropriate incisional tenderness  Extremities: WWP, SCDs in place, No significant edema appreciated    LABS:                        9.6    7.94  )-----------( 206      ( 14 Dec 2022 05:30 )             28.9     12-14    135  |  105  |  19  ----------------------------<  109<H>  3.7   |  23  |  0.70    Ca    7.6<L>      14 Dec 2022 05:30  Phos  1.8     12-14  Mg     2.5     12-14    TPro  5.0<L>  /  Alb  2.2<L>  /  TBili  1.2  /  DBili  0.9<H>  /  AST  22  /  ALT  14  /  AlkPhos  58  12-14    PT/INR - ( 14 Dec 2022 05:30 )   PT: 58.8 sec;   INR: 4.86          PTT - ( 14 Dec 2022 11:54 )  PTT:63.9 sec General Surgery Progress Note    SUBJECTIVE:   Patient seen and examined at bedside by chief during AM rounds. Overnight patient noted to be weened off vasopressin. Patient still intubated and sedated in ICU.    Vital Signs Last 24 Hrs  T(C): 35.8 (14 Dec 2022 12:00), Max: 36.8 (13 Dec 2022 18:15)  T(F): 96.5 (14 Dec 2022 12:00), Max: 98.2 (13 Dec 2022 18:15)  HR: 106 (14 Dec 2022 15:00) (53 - 110)  BP: 118/71 (14 Dec 2022 15:00) (95/65 - 118/71)  BP(mean): 87 (14 Dec 2022 15:00) (75 - 88)  RR: 12 (14 Dec 2022 15:00) (12 - 16)  SpO2: 100% (14 Dec 2022 15:00) (100% - 100%)    Parameters below as of 14 Dec 2022 15:00  Patient On (Oxygen Delivery Method): ventilator    O2 Concentration (%): 40    I&O's Summary    13 Dec 2022 07:01  -  14 Dec 2022 07:00  --------------------------------------------------------  IN: 3668.5 mL / OUT: 1550 mL / NET: 2118.5 mL    14 Dec 2022 07:01  -  14 Dec 2022 16:02  --------------------------------------------------------  IN: 1161.8 mL / OUT: 1495 mL / NET: -333.2 mL        Physical Exam:  General: Resting comfortably in bed, NAD  HEENT: ATNC, ETT in place, OG tube present  Pulmonary: Mechanically ventilated, no respiratory distress, saturating well  Cardiovascular: NSR  Abdomen: Soft, nondistended, OLGA noted to be light SS, prevena vac midline with SS  Extremities: cold to touch, no edema    LABS:                        9.6    7.94  )-----------( 206      ( 14 Dec 2022 05:30 )             28.9     12-14    135  |  105  |  19  ----------------------------<  109<H>  3.7   |  23  |  0.70    Ca    7.6<L>      14 Dec 2022 05:30  Phos  1.8     12-14  Mg     2.5     12-14    TPro  5.0<L>  /  Alb  2.2<L>  /  TBili  1.2  /  DBili  0.9<H>  /  AST  22  /  ALT  14  /  AlkPhos  58  12-14    PT/INR - ( 14 Dec 2022 05:30 )   PT: 58.8 sec;   INR: 4.86          PTT - ( 14 Dec 2022 11:54 )  PTT:63.9 sec

## 2022-12-14 NOTE — PROGRESS NOTE ADULT - ASSESSMENT
76 y/o M with no significant PMHx found unresponsive at his nursing home, resolved after narcan in the field, and brought to [facility]. Found to have LV dysfunction (Tachy mediated), sub-segmental PE, atrial flutter, and large LV thrombus on echo. Transferred to [facility] for further management. Course complicated by acute mesenteric ischemia now s/p embolectomy and bowel resection, septic shock, LLE arterial occlusion.      #HFrEF   EF 10-15%, LVIDD 5. Severely reduced RV function.   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   Diuretics: none; goal CVP 8  GDMT: holding while on pressors; was on nipride gtt at 1.5 off since 12/11   - daily mixed venous labs (using as a surrogate to calculate CO/CI)  - PRN IVF (250 cc boluses) to maintain CVP ~8  - wean levophed and vasopressin     #Atrial Flutter   - c/w amio gtt  - c/w  heparin gtt     #LV thrombus   LV thrombus not visualized on repeat bedside limited echo, possible that previously visualized LV thrombus embolized to bowel     #Acute mesenteric ischemia   Now s/p Exlap Embolectomy of SMA and resection of of SB open abdomen  - further management per SICU team    *incomplete   78 y/o M with no significant PMHx found unresponsive at his nursing home, resolved after narcan in the field, and brought to [facility]. Found to have LV dysfunction (Tachy mediated), sub-segmental PE, atrial flutter, and large LV thrombus on echo. Transferred to [facility] for further management. Course complicated by acute mesenteric ischemia now s/p embolectomy and bowel resection, septic shock, LLE arterial occlusion.      #HFrEF   EF 10-15%, LVIDD 5. Severely reduced RV function.   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

## 2022-12-14 NOTE — PROGRESS NOTE ADULT - SUBJECTIVE AND OBJECTIVE BOX
INTERVAL EVENTS: Exlap with small bowel resection     PAST MEDICAL & SURGICAL HISTORY:      MEDICATIONS  (STANDING):  acetaminophen   IVPB .. 1000 milliGRAM(s) IV Intermittent once  albuterol/ipratropium for Nebulization 3 milliLiter(s) Nebulizer every 6 hours  aMIOdarone Infusion 0.5 mG/Min (16.7 mL/Hr) IV Continuous <Continuous>  aMIOdarone Infusion 0.501 mG/Min (16.7 mL/Hr) IV Continuous <Continuous>  aspirin Suppository 150 milliGRAM(s) Rectal daily  chlorhexidine 0.12% Liquid 15 milliLiter(s) Oral Mucosa every 12 hours  chlorhexidine 2% Cloths 1 Application(s) Topical <User Schedule>  dextrose 5%. 1000 milliLiter(s) (50 mL/Hr) IV Continuous <Continuous>  dextrose 5%. 1000 milliLiter(s) (100 mL/Hr) IV Continuous <Continuous>  dextrose 50% Injectable 25 Gram(s) IV Push once  dextrose 50% Injectable 12.5 Gram(s) IV Push once  dextrose 50% Injectable 25 Gram(s) IV Push once  fentaNYL   Infusion. 0.5 MICROgram(s)/kG/Hr (3.22 mL/Hr) IV Continuous <Continuous>  glucagon  Injectable 1 milliGRAM(s) IntraMuscular once  heparin  Infusion 850 Unit(s)/Hr (8.5 mL/Hr) IV Continuous <Continuous>  influenza  Vaccine (HIGH DOSE) 0.7 milliLiter(s) IntraMuscular once  insulin lispro (ADMELOG) corrective regimen sliding scale   SubCutaneous every 6 hours  midazolam Infusion 0.02 mG/kG/Hr (1.29 mL/Hr) IV Continuous <Continuous>  norepinephrine Infusion 0.05 MICROgram(s)/kG/Min (6.04 mL/Hr) IV Continuous <Continuous>  pantoprazole  Injectable 40 milliGRAM(s) IV Push two times a day  piperacillin/tazobactam IVPB.. 4.5 Gram(s) IV Intermittent every 8 hours  potassium chloride  10 mEq/100 mL IVPB 10 milliEquivalent(s) IV Intermittent every 1 hour  sodium chloride 0.9%. 1000 milliLiter(s) (100 mL/Hr) IV Continuous <Continuous>  vasopressin Infusion 0.04 Unit(s)/Min (6 mL/Hr) IV Continuous <Continuous>    MEDICATIONS  (PRN):  dextrose Oral Gel 15 Gram(s) Oral once PRN Blood Glucose LESS THAN 70 milliGRAM(s)/deciliter    Vital Signs Last 24 Hrs  T(C): 35.4 (14 Dec 2022 07:00), Max: 37.4 (13 Dec 2022 10:00)  T(F): 95.8 (14 Dec 2022 07:00), Max: 99.3 (13 Dec 2022 10:00)  HR: 88 (14 Dec 2022 07:00) (53 - 112)  BP: 114/75 (14 Dec 2022 01:00) (103/67 - 136/88)  BP(mean): 88 (14 Dec 2022 01:00) (81 - 107)  RR: 12 (14 Dec 2022 07:00) (12 - 19)  SpO2: 100% (14 Dec 2022 07:00) (99% - 100%)    Parameters below as of 14 Dec 2022 07:00  Patient On (Oxygen Delivery Method): ventilator    O2 Concentration (%): 40    PHYSICAL EXAM:  GEN: intubated  RESP: CTA b/l  CV: RRR. Normal S1/S2. No m/r/g.  ABD: soft, non-distended  EXT: No edema     LABS:                        9.6    7.94  )-----------( 206      ( 14 Dec 2022 05:30 )             28.9     12-14    135  |  105  |  19  ----------------------------<  109<H>  3.7   |  23  |  0.70    Ca    7.6<L>      14 Dec 2022 05:30  Phos  1.8     12-14  Mg     2.5     12-14    TPro  5.0<L>  /  Alb  2.2<L>  /  TBili  1.2  /  DBili  0.9<H>  /  AST  22  /  ALT  14  /  AlkPhos  58  12-14        PT/INR - ( 14 Dec 2022 05:30 )   PT: 58.8 sec;   INR: 4.86          PTT - ( 14 Dec 2022 05:30 )  PTT:72.3 sec    I&O's Summary    13 Dec 2022 07:01  -  14 Dec 2022 07:00  --------------------------------------------------------  IN: 3568.5 mL / OUT: 1550 mL / NET: 2018.5 mL    14 Dec 2022 07:01  -  14 Dec 2022 08:20  --------------------------------------------------------  IN: 146.5 mL / OUT: 0 mL / NET: 146.5 mL

## 2022-12-14 NOTE — PROGRESS NOTE ADULT - SUBJECTIVE AND OBJECTIVE BOX
Patient is a 77y Male seen and evaluated at bedside. patient remains intubated sedated with decreasing oxygen requirements soft pressures overnight though not restarted on pressor support /56 K 3.7 Cr 0.7        Meds:    acetaminophen   IVPB .. 1000 once  albuterol/ipratropium for Nebulization 3 every 6 hours  aMIOdarone Infusion 0.5 <Continuous>  aMIOdarone Infusion 0.501 <Continuous>  aspirin Suppository 150 daily  chlorhexidine 0.12% Liquid 15 every 12 hours  chlorhexidine 2% Cloths 1 <User Schedule>  dextrose 5%. 1000 <Continuous>  dextrose 5%. 1000 <Continuous>  dextrose 50% Injectable 25 once  dextrose 50% Injectable 12.5 once  dextrose 50% Injectable 25 once  dextrose Oral Gel 15 once PRN  fentaNYL   Infusion. 0.5 <Continuous>  glucagon  Injectable 1 once  heparin  Infusion 850 <Continuous>  influenza  Vaccine (HIGH DOSE) 0.7 once  insulin lispro (ADMELOG) corrective regimen sliding scale  every 6 hours  midazolam Infusion 0.02 <Continuous>  norepinephrine Infusion 0.05 <Continuous>  pantoprazole  Injectable 40 two times a day  piperacillin/tazobactam IVPB.. 4.5 every 8 hours  sodium chloride 0.9%. 1000 <Continuous>  vasopressin Infusion 0.04 <Continuous>      T(C): , Max: 36.8 (12-13-22 @ 18:15)  T(F): , Max: 98.2 (12-13-22 @ 18:15)  HR: 102 (12-14-22 @ 10:00)  BP: 107/56 (12-14-22 @ 10:00)  BP(mean): 77 (12-14-22 @ 10:00)  RR: 13 (12-14-22 @ 10:00)  SpO2: 100% (12-14-22 @ 10:00)  Wt(kg): --    12-13 @ 07:01  -  12-14 @ 07:00  --------------------------------------------------------  IN: 3668.5 mL / OUT: 1550 mL / NET: 2118.5 mL    12-14 @ 07:01  -  12-14 @ 11:08  --------------------------------------------------------  IN: 489.5 mL / OUT: 410 mL / NET: 79.5 mL          Review of Systems:  unable to participate      PHYSICAL EXAM:  GENERAL: intubated; sedated  CHEST/LUNG: mechanical breath sounds BL; no accessory muscle use   HEART: normal S1S2, RRR  ABDOMEN: Soft, Nontender, +BS, No flank tenderness bilateral  EXTREMITIES: No clubbing, cyanosis, or edema   NEUROLOGY: intubated  SKIN: no notable lesions or rashes       LABS:                        9.6    7.94  )-----------( 206      ( 14 Dec 2022 05:30 )             28.9     12-14    135  |  105  |  19  ----------------------------<  109<H>  3.7   |  23  |  0.70    Ca    7.6<L>      14 Dec 2022 05:30  Phos  1.8     12-14  Mg     2.5     12-14    TPro  5.0<L>  /  Alb  2.2<L>  /  TBili  1.2  /  DBili  0.9<H>  /  AST  22  /  ALT  14  /  AlkPhos  58  12-14      PT/INR - ( 14 Dec 2022 05:30 )   PT: 58.8 sec;   INR: 4.86          PTT - ( 14 Dec 2022 05:30 )  PTT:72.3 sec    Creatinine, Random Urine: 120 mg/dL (12-12 @ 16:14)  Protein/Creatinine Ratio Calculation: 1.2 Ratio (12-12 @ 16:14)        RADIOLOGY & ADDITIONAL STUDIES:           Patient is a 77y Male seen and evaluated at bedside. patient remains intubated sedated with decreasing oxygen requirements soft pressures overnight though not restarted on pressor support /56 K 3.7 Cr 0.7        Meds:    acetaminophen   IVPB .. 1000 once  albuterol/ipratropium for Nebulization 3 every 6 hours  aMIOdarone Infusion 0.5 <Continuous>  aMIOdarone Infusion 0.501 <Continuous>  aspirin Suppository 150 daily  chlorhexidine 0.12% Liquid 15 every 12 hours  chlorhexidine 2% Cloths 1 <User Schedule>  dextrose 5%. 1000 <Continuous>  dextrose 5%. 1000 <Continuous>  dextrose 50% Injectable 25 once  dextrose 50% Injectable 12.5 once  dextrose 50% Injectable 25 once  dextrose Oral Gel 15 once PRN  fentaNYL   Infusion. 0.5 <Continuous>  glucagon  Injectable 1 once  heparin  Infusion 850 <Continuous>  influenza  Vaccine (HIGH DOSE) 0.7 once  insulin lispro (ADMELOG) corrective regimen sliding scale  every 6 hours  midazolam Infusion 0.02 <Continuous>  norepinephrine Infusion 0.05 <Continuous>  pantoprazole  Injectable 40 two times a day  piperacillin/tazobactam IVPB.. 4.5 every 8 hours  sodium chloride 0.9%. 1000 <Continuous>  vasopressin Infusion 0.04 <Continuous>      T(C): , Max: 36.8 (12-13-22 @ 18:15)  T(F): , Max: 98.2 (12-13-22 @ 18:15)  HR: 102 (12-14-22 @ 10:00)  BP: 107/56 (12-14-22 @ 10:00)  BP(mean): 77 (12-14-22 @ 10:00)  RR: 13 (12-14-22 @ 10:00)  SpO2: 100% (12-14-22 @ 10:00)  Wt(kg): --    12-13 @ 07:01  -  12-14 @ 07:00  --------------------------------------------------------  IN: 3668.5 mL / OUT: 1550 mL / NET: 2118.5 mL    12-14 @ 07:01  -  12-14 @ 11:08  --------------------------------------------------------  IN: 489.5 mL / OUT: 410 mL / NET: 79.5 mL          Review of Systems:  unable to participate      PHYSICAL EXAM:  GENERAL: intubated; sedated, NAD  CHEST/LUNG: mechanical breath sounds BL; no accessory muscle use   HEART: normal S1S2, RRR  ABDOMEN: Soft, Nontender, +BS, No flank tenderness bilateral  EXTREMITIES: No clubbing, cyanosis, or edema   NEUROLOGY: intubated, sedated  SKIN: no notable lesions or rashes       LABS:                        9.6    7.94  )-----------( 206      ( 14 Dec 2022 05:30 )             28.9     12-14    135  |  105  |  19  ----------------------------<  109<H>  3.7   |  23  |  0.70    Ca    7.6<L>      14 Dec 2022 05:30  Phos  1.8     12-14  Mg     2.5     12-14    TPro  5.0<L>  /  Alb  2.2<L>  /  TBili  1.2  /  DBili  0.9<H>  /  AST  22  /  ALT  14  /  AlkPhos  58  12-14      PT/INR - ( 14 Dec 2022 05:30 )   PT: 58.8 sec;   INR: 4.86          PTT - ( 14 Dec 2022 05:30 )  PTT:72.3 sec    Creatinine, Random Urine: 120 mg/dL (12-12 @ 16:14)  Protein/Creatinine Ratio Calculation: 1.2 Ratio (12-12 @ 16:14)        RADIOLOGY & ADDITIONAL STUDIES:      Creatinine, Serum: 0.70 mg/dL (12-14-22 @ 05:30)  Creatinine, Serum: 0.82 mg/dL (12-13-22 @ 12:10)  Creatinine, Serum: 0.96 mg/dL (12-13-22 @ 05:30)  Creatinine, Serum: 1.27 mg/dL (12-12-22 @ 03:05)  Creatinine, Serum: 1.18 mg/dL (12-11-22 @ 16:23)  Creatinine, Serum: 0.68 mg/dL (12-11-22 @ 08:24)  Creatinine, Serum: 0.78 mg/dL (12-11-22 @ 03:44)  Creatinine, Serum: 0.68 mg/dL (12-11-22 @ 00:38)  Creatinine, Serum: 0.71 mg/dL (12-10-22 @ 05:58)  Creatinine, Serum: 0.81 mg/dL (12-10-22 @ 02:16)

## 2022-12-14 NOTE — PROGRESS NOTE ADULT - ASSESSMENT
77 year old male with no documented past medical history presenting from [facility] after being found unresponsive at his nursing home s/p narcan with resolution. Found to have atrial flutter. Echo showed severely reduced LV function with an LV thrombus. Heparin gtt was started and pt transferred to [facility] for LORENZO and possible ablation. Vascular consulted for cold leg. Bilateral Duplex (12/8) showed acute thrombus completely occluding the left popliteal artery and acute thrombus incompletely occluding the left dorsalis pedis artery and likely acute thrombus completely occluding the left mid superficial femoral artery and extending to the left popliteal artery. Left DP with absent signals. Patient noted to have acute abdominal pain overnight with bloody diarrhea c/f acute mesenteric ischemia and taken emergently to the OR. Now POD2 s/p ex lap, SMA embolectomy, and SBR (80 cm), POD 1 from second look with additional small bowel resection (50cm) with wound left open (abthera in place) with plans for second look. No need mesenteric angiogram today as patient is off pressors and hemodynamically stable.     Plan:   - Recommend continued hep gtt to goal of PTT    - Rest of care per SICU  - Vascular surgery Team 3C will continue to follow. Please call [phone] with any questions or concerns.

## 2022-12-14 NOTE — PROGRESS NOTE ADULT - ATTENDING COMMENTS
76 YO M with a history of cocaine/opiate abuse and resident of a nursing facility who was brought to [facility] with unresponsiveness that was alleviated with narcan and found to be in rapid atrial flutter with severe LV dysfunction with LV hrombus and subsegemental PE prompting transfer to [facility] on 12/7. He was also found to have limb ischemia with arterial thrombosis of LLE vessels. He was being considered for rhythm control when he developed abdominal pain and found to have acute mesenteric ischemia and underwent emergent bowel resection and SMA thrombectomy. His LV thrombus us no longer apparent on TTE and has likely embolized.     He is currently intubated with an open abdomen. From a HF perspective, he is euvolemic with CVP's in low single digits and central venous saturations suggest normal cardiac output. Prognosis is guarded but slowly improving.    REVIEW OF STUDIES  TTE: LV 5.0 cm, LVEF 10-15% with global hypokinesis, 2.5 cm mobile LV thrombus, severe RV dysfunction  LORENZO: no ISIAH thrombus     PLAN  # Acute systolic heart failure  - Weaned off levophed  - Will introduce GDMT when appropriate   - Euvolemic off diuretic  - Continue to follow daily central venous saturations, no signs of low cardiac output at this time that would necessitate inotropes   - Etiology is possibly tachycardia induced. not a candidate for rhythm control at this time    # Atrial flutter  - EP following  - Continue heparin drip  - Rates controlled at this time   - Possible candidate for DCCV pending resolution of acute surgical issues    # Acute limb ischemia and mesenteric ischemia from LV thrombus  - management per surgery/vascular  - continue anticoagulation    # GOLDIE  - likely post-op ATN and renal infarction, resolved .

## 2022-12-14 NOTE — PROGRESS NOTE ADULT - ATTENDING COMMENTS
I agree with the fellow's findings and plans as written above with the following additions/amendments:    Seen and examined at bedside. Intubated, sedated, NAD. UOP slowing and with concentrated urine, lower BPs, would give IVF as above and repeat as needed to keep MAP >65. Further recs as above

## 2022-12-15 NOTE — BRIEF OPERATIVE NOTE - OPERATION/FINDINGS
Previous abthera vac removed and abominal cavity explored. Bowel run from ligament of treitz to ileocecal valve and colon inspected. All bowel appeared viable, no evidence of ischemia or damage. To small bowel ends previously left in discontinuity appeared viable with good perfusion. Hand-sewn 2 layer end-to-end functional side-to-side anastomosis created. Ileum measured 20 cm proximal Previous abthera vac removed and abdominal cavity explored. Bowel run from ligament of treitz to ileocecal valve and colon inspected. All bowel appeared viable, no evidence of ischemia or damage. To small bowel ends previously left in discontinuity appeared viable with good perfusion. Hand-sewn 2 layer side-to-side functional end-to-end anastomosis created. Ileum measured 20 cm proximal and prepared for loop ileostomy. Fascia closed with #PDS suture. Loop ileostomy matured with interrupted suture. Skin closed with staples.

## 2022-12-15 NOTE — PROGRESS NOTE ADULT - ASSESSMENT
78 y/o M with Significant PMHx of IVDU found unresponsive at his nursing home, resolved after Narcan in the field, and brought to [facility]. Found to have PE, atrial flutter, and large LV thrombus on echo. Transferred to [facility] for further management. Pt C/o abdominal pain on 12/10 CTA showing mid SMA with embolus. Abnormal distal small bowel loops and cecum with dilatation and pneumatosis suggesting infarcted bowel. One or two tiny foci of  intrahepatic portal vein pneumatosis. Segmental infarction upper pole left kidney. Now s/p Exlap Embolectomy of SMA and resection of 80cm of SB pt left in discontinuity and transferred to SICU intubated EBL 20cc, 1.5l IVF, uo 150 OVER 2.5 HR. taken back to OR for second look.     Neuro: Versed fentanyl  RASS-4  CV: Septic shock Now off Pressors. Goal MAP ( > 65)  Cont Amio gt for Aflutter s/p Dig CHF EF 15% Left LV thrombus: Cont heparin( 60-80) Hold Nipride for hypotension, ASA 150mg Supp.   Pulm: Satting well on vent 40%/520/12/5   GI: NPO NGT to LIWS. Open Abdomen - Abthera.   : Uriarte Infarction of upper pole of Left Kidney, Renal US negative on 12/12.   ID: Zosyn ( 12/11-) Hep C Positive. D/c: Vanco  ( 12/11-12/12)   Endo: ISS  PPx: SCD Heparin gtt  Lines: Rt TLC ( 12/11-)  Wounds: Midline open abdomen  PT/OT: not ordered Strict bedrest  Dispo: Pending OR today

## 2022-12-15 NOTE — CHART NOTE - NSCHARTNOTEFT_GEN_A_CORE
Patient seen and examined bedside post -op with vascular surgery team. Patient's left leg showing sings of ischemia and demarcation just below knee. Left leg cool to touch and non-dopplerable signals in foot unchanged from prior exams. No signs of infection or wet-gangrene of left lower extremity. No acute vascular surgery intervention indicated at this time given overall clinical status of patient. Heparin gtt resumed and will continue to monitor closely.

## 2022-12-15 NOTE — PROGRESS NOTE ADULT - SUBJECTIVE AND OBJECTIVE BOX
SUBJECTIVE:  Pt seen and examined at bedside this am. Sedated, off pressors, open abdomen    MEDICATIONS  (STANDING):  acetaminophen   IVPB .. 1000 milliGRAM(s) IV Intermittent once  albuterol/ipratropium for Nebulization 3 milliLiter(s) Nebulizer every 6 hours  aMIOdarone Infusion 0.501 mG/Min (16.7 mL/Hr) IV Continuous <Continuous>  aspirin Suppository 150 milliGRAM(s) Rectal daily  chlorhexidine 0.12% Liquid 15 milliLiter(s) Oral Mucosa every 12 hours  chlorhexidine 2% Cloths 1 Application(s) Topical <User Schedule>  dextrose 5%. 1000 milliLiter(s) (50 mL/Hr) IV Continuous <Continuous>  dextrose 5%. 1000 milliLiter(s) (100 mL/Hr) IV Continuous <Continuous>  dextrose 50% Injectable 25 Gram(s) IV Push once  dextrose 50% Injectable 12.5 Gram(s) IV Push once  dextrose 50% Injectable 25 Gram(s) IV Push once  fentaNYL   Infusion. 0.5 MICROgram(s)/kG/Hr (3.22 mL/Hr) IV Continuous <Continuous>  glucagon  Injectable 1 milliGRAM(s) IntraMuscular once  heparin  Infusion 850 Unit(s)/Hr (8.5 mL/Hr) IV Continuous <Continuous>  influenza  Vaccine (HIGH DOSE) 0.7 milliLiter(s) IntraMuscular once  insulin lispro (ADMELOG) corrective regimen sliding scale   SubCutaneous every 6 hours  midazolam Infusion 0.02 mG/kG/Hr (1.29 mL/Hr) IV Continuous <Continuous>  pantoprazole  Injectable 40 milliGRAM(s) IV Push two times a day  piperacillin/tazobactam IVPB.. 4.5 Gram(s) IV Intermittent every 8 hours  sodium chloride 0.9%. 1000 milliLiter(s) (100 mL/Hr) IV Continuous <Continuous>    MEDICATIONS  (PRN):  dextrose Oral Gel 15 Gram(s) Oral once PRN Blood Glucose LESS THAN 70 milliGRAM(s)/deciliter      Vital Signs Last 24 Hrs  T(C): 36.9 (15 Dec 2022 07:39), Max: 37.1 (14 Dec 2022 17:55)  T(F): 98.4 (15 Dec 2022 06:05), Max: 98.8 (14 Dec 2022 17:55)  HR: 122 (15 Dec 2022 07:39) (96 - 122)  BP: 134/90 (15 Dec 2022 07:39) (95/65 - 155/82)  BP(mean): 107 (15 Dec 2022 07:39) (75 - 108)  RR: 15 (15 Dec 2022 07:39) (12 - 24)  SpO2: 100% (15 Dec 2022 07:39) (95% - 100%)    Parameters below as of 15 Dec 2022 07:39      O2 Concentration (%): 40    Physical Exam  General: NAD, sedated in bed  Pulmonary: Nonlabored breathing, intubated  CV: NSR  Abd: soft, abthera in place and connected to wound VAC  Extremities: (-) edema, warm, well-perfused, no L DP/PT signals, R PT mono, no R DP      I&O's Detail    14 Dec 2022 07:01  -  15 Dec 2022 07:00  --------------------------------------------------------  IN:    Amiodarone: 334 mL    FentaNYL: 183.7 mL    Heparin: 204 mL    IV PiggyBack: 100 mL    IV PiggyBack: 225 mL    Midazolam: 93.4 mL    sodium chloride 0.9%: 2400 mL  Total IN: 3540.1 mL    OUT:    Indwelling Catheter - Urethral (mL): 2990 mL    Nasogastric/Oral tube (mL): 25 mL    Norepinephrine: 0 mL    VAC (Vacuum Assisted Closure) System (mL): 575 mL    Vasopressin: 0 mL  Total OUT: 3590 mL    Total NET: -49.9 mL      15 Dec 2022 07:01  -  15 Dec 2022 08:53  --------------------------------------------------------  IN:    Amiodarone: 16.7 mL    FentaNYL: 9.7 mL    Heparin: 8.5 mL    Midazolam: 5.2 mL    sodium chloride 0.9%: 100 mL  Total IN: 140.1 mL    OUT:    Indwelling Catheter - Urethral (mL): 100 mL  Total OUT: 100 mL    Total NET: 40.1 mL          LABS:                        10.7   9.11  )-----------( 262      ( 15 Dec 2022 04:34 )             33.1     12-15    138  |  106  |  12  ----------------------------<  98  3.9   |  24  |  0.66    Ca    7.8<L>      15 Dec 2022 04:34  Phos  1.7     12-15  Mg     2.5     12-15    TPro  5.7<L>  /  Alb  2.3<L>  /  TBili  1.6<H>  /  DBili  1.2<H>  /  AST  57<H>  /  ALT  25  /  AlkPhos  96  12-15    PT/INR - ( 15 Dec 2022 04:34 )   PT: 56.4 sec;   INR: 4.67          PTT - ( 15 Dec 2022 04:34 )  PTT:71.4 sec      RADIOLOGY & ADDITIONAL STUDIES:

## 2022-12-15 NOTE — PROGRESS NOTE ADULT - SUBJECTIVE AND OBJECTIVE BOX
INTERVAL/OVERNIGHT EVENTS: Changed cannister. Preopped for OR 12/15. AM labs: PTT 71. No chg to Hep gtt. INR> 4. Repleted KPhos    SUBJECTIVE: Patient seen at bedside intubated and sedated. Off pressors since 12/14 AM.       Neurologic Medications  acetaminophen   IVPB .. 1000 milliGRAM(s) IV Intermittent once  aspirin Suppository 150 milliGRAM(s) Rectal daily  fentaNYL   Infusion. 0.5 MICROgram(s)/kG/Hr IV Continuous <Continuous>  midazolam Infusion 0.02 mG/kG/Hr IV Continuous <Continuous>    Respiratory Medications  albuterol/ipratropium for Nebulization 3 milliLiter(s) Nebulizer every 6 hours    Cardiovascular Medications  aMIOdarone Infusion 0.501 mG/Min IV Continuous <Continuous>    Gastrointestinal Medications  dextrose 5%. 1000 milliLiter(s) IV Continuous <Continuous>  dextrose 5%. 1000 milliLiter(s) IV Continuous <Continuous>  pantoprazole  Injectable 40 milliGRAM(s) IV Push two times a day  sodium chloride 0.9%. 1000 milliLiter(s) IV Continuous <Continuous>    Genitourinary Medications    Hematologic/Oncologic Medications  heparin  Infusion 850 Unit(s)/Hr IV Continuous <Continuous>  influenza  Vaccine (HIGH DOSE) 0.7 milliLiter(s) IntraMuscular once    Antimicrobial/Immunologic Medications  piperacillin/tazobactam IVPB.. 4.5 Gram(s) IV Intermittent every 8 hours    Endocrine/Metabolic Medications  dextrose 50% Injectable 25 Gram(s) IV Push once  dextrose 50% Injectable 12.5 Gram(s) IV Push once  dextrose 50% Injectable 25 Gram(s) IV Push once  dextrose Oral Gel 15 Gram(s) Oral once PRN Blood Glucose LESS THAN 70 milliGRAM(s)/deciliter  glucagon  Injectable 1 milliGRAM(s) IntraMuscular once  insulin lispro (ADMELOG) corrective regimen sliding scale   SubCutaneous every 6 hours    Topical/Other Medications  chlorhexidine 0.12% Liquid 15 milliLiter(s) Oral Mucosa every 12 hours  chlorhexidine 2% Cloths 1 Application(s) Topical <User Schedule>      MEDICATIONS  (PRN):  dextrose Oral Gel 15 Gram(s) Oral once PRN Blood Glucose LESS THAN 70 milliGRAM(s)/deciliter      I&O's Detail    14 Dec 2022 07:01  -  15 Dec 2022 07:00  --------------------------------------------------------  IN:    Amiodarone: 334 mL    FentaNYL: 183.7 mL    Heparin: 204 mL    IV PiggyBack: 100 mL    IV PiggyBack: 225 mL    Midazolam: 93.4 mL    sodium chloride 0.9%: 2400 mL  Total IN: 3540.1 mL    OUT:    Indwelling Catheter - Urethral (mL): 2990 mL    Nasogastric/Oral tube (mL): 25 mL    Norepinephrine: 0 mL    VAC (Vacuum Assisted Closure) System (mL): 575 mL    Vasopressin: 0 mL  Total OUT: 3590 mL    Total NET: -49.9 mL      15 Dec 2022 07:01  -  15 Dec 2022 10:09  --------------------------------------------------------  IN:    Amiodarone: 16.7 mL    FentaNYL: 9.7 mL    Heparin: 8.5 mL    Midazolam: 5.2 mL    sodium chloride 0.9%: 100 mL  Total IN: 140.1 mL    OUT:    Indwelling Catheter - Urethral (mL): 100 mL  Total OUT: 100 mL    Total NET: 40.1 mL          Vital Signs Last 24 Hrs  T(C): 36.9 (15 Dec 2022 07:39), Max: 37.1 (14 Dec 2022 17:55)  T(F): 98.4 (15 Dec 2022 06:05), Max: 98.8 (14 Dec 2022 17:55)  HR: 122 (15 Dec 2022 07:39) (96 - 122)  BP: 134/90 (15 Dec 2022 07:39) (95/65 - 155/82)  BP(mean): 107 (15 Dec 2022 07:39) (75 - 108)  RR: 15 (15 Dec 2022 07:39) (12 - 24)  SpO2: 100% (15 Dec 2022 07:39) (95% - 100%)    Parameters below as of 15 Dec 2022 07:39      O2 Concentration (%): 40    Gen: NAD   Neuro: Sedated intubated  HEENT: ETT in place OGT in place draining minimal brown fluid  CV: RRR reg s1s2 no M  Pulm: CTA B/L no w/w/r  Abd: Soft, Midline open abdomen with abthera wound vac in place, draining serosanguinous fluid.   : Uriarte in place draining straw colored urine  Ext: No C/C/E Extremities Cool to touch. No dopplerable pulse in the Left Lext + Rt PT.   Skin: No rashes erythema or ecchymosis  MSK: No joint swelling noted  Psych: unable to assess    LABS:                        10.7   9.11  )-----------( 262      ( 15 Dec 2022 04:34 )             33.1     12-15    138  |  106  |  12  ----------------------------<  98  3.9   |  24  |  0.66    Ca    7.8<L>      15 Dec 2022 04:34  Phos  1.7     12-15  Mg     2.5     12-15    TPro  5.7<L>  /  Alb  2.3<L>  /  TBili  1.6<H>  /  DBili  1.2<H>  /  AST  57<H>  /  ALT  25  /  AlkPhos  96  12-15    PT/INR - ( 15 Dec 2022 04:34 )   PT: 56.4 sec;   INR: 4.67          PTT - ( 15 Dec 2022 04:34 )  PTT:71.4 sec      RADIOLOGY & ADDITIONAL STUDIES:      Culture - Blood (collected 12-11-22 @ 13:13)  Source: .Blood Blood-Peripheral  Preliminary Report (12-14-22 @ 14:01):    No growth at 3 days.    Culture - Blood (collected 12-11-22 @ 13:13)  Source: .Blood Blood-Peripheral  Preliminary Report (12-14-22 @ 14:01):    No growth at 3 days.

## 2022-12-15 NOTE — CHART NOTE - NSCHARTNOTEFT_GEN_A_CORE
Admitting Diagnosis:   Patient is a 77y old  Male who presents with a chief complaint of A flutter (15 Dec 2022 10:08)      PAST MEDICAL & SURGICAL HISTORY:      Current Nutrition Order:  NPO      PO Intake: Good (%) [   ]  Fair (50-75%) [   ] Poor (<25%) [   ]--NA NPO     GI Issues:   Pt pending ostomy in OR today   Per flow sheets: flat, hypoactive  LWS: 25ml 12/15, 50ml      Pain:  None per flow sheets     Skin Integrity:  Buddy 13, 1+BL ankl edema, No pressure ulcers-SX and wound VAC noted     Labs:   12-15    139  |  108  |  11  ----------------------------<  103<H>  4.1   |  24  |  0.64    Ca    8.0<L>      15 Dec 2022 11:40  Phos  2.6     12-15  Mg     2.3     12-15    TPro  5.4<L>  /  Alb  2.3<L>  /  TBili  1.8<H>  /  DBili  x   /  AST  59<H>  /  ALT  26  /  AlkPhos  93  12-15    CAPILLARY BLOOD GLUCOSE      POCT Blood Glucose.: 99 mg/dL (15 Dec 2022 12:11)  POCT Blood Glucose.: 80 mg/dL (15 Dec 2022 06:41)  POCT Blood Glucose.: 88 mg/dL (14 Dec 2022 23:25)  POCT Blood Glucose.: 89 mg/dL (14 Dec 2022 18:17)      Medications:  MEDICATIONS  (STANDING):  acetaminophen   IVPB .. 1000 milliGRAM(s) IV Intermittent once  albuterol/ipratropium for Nebulization 3 milliLiter(s) Nebulizer every 6 hours  aMIOdarone Infusion 0.501 mG/Min (16.7 mL/Hr) IV Continuous <Continuous>  aspirin Suppository 150 milliGRAM(s) Rectal daily  chlorhexidine 0.12% Liquid 15 milliLiter(s) Oral Mucosa every 12 hours  chlorhexidine 2% Cloths 1 Application(s) Topical <User Schedule>  dextrose 5%. 1000 milliLiter(s) (50 mL/Hr) IV Continuous <Continuous>  dextrose 5%. 1000 milliLiter(s) (100 mL/Hr) IV Continuous <Continuous>  dextrose 50% Injectable 25 Gram(s) IV Push once  dextrose 50% Injectable 12.5 Gram(s) IV Push once  dextrose 50% Injectable 25 Gram(s) IV Push once  fentaNYL   Infusion. 0.5 MICROgram(s)/kG/Hr (3.22 mL/Hr) IV Continuous <Continuous>  glucagon  Injectable 1 milliGRAM(s) IntraMuscular once  heparin  Infusion 850 Unit(s)/Hr (8.5 mL/Hr) IV Continuous <Continuous>  influenza  Vaccine (HIGH DOSE) 0.7 milliLiter(s) IntraMuscular once  insulin lispro (ADMELOG) corrective regimen sliding scale   SubCutaneous every 6 hours  midazolam Infusion 0.02 mG/kG/Hr (1.29 mL/Hr) IV Continuous <Continuous>  pantoprazole  Injectable 40 milliGRAM(s) IV Push two times a day  piperacillin/tazobactam IVPB.. 4.5 Gram(s) IV Intermittent every 8 hours  sodium chloride 0.9% Bolus 500 milliLiter(s) IV Bolus once  sodium chloride 0.9%. 1000 milliLiter(s) (100 mL/Hr) IV Continuous <Continuous>    MEDICATIONS  (PRN):  dextrose Oral Gel 15 Gram(s) Oral once PRN Blood Glucose LESS THAN 70 milliGRAM(s)/deciliter          6'6''  pounds +-10%  Wt 142 pounds BMI 16.4 %IBW=66%     Weight Change:   *No new EMR wts    Bedscale wt 136 pounds     **PCM:  >> Reports having 1-2 meals/day + ONS. Per pt claims to have 2 ensure/day and 2 nutrament/day - unclear how accurate this is. ?If pt meeting ~75% EER consistently.  >> Does report wt loss.  pounds x6 months ago. Thinks he now is 135 pounds which is consistent with bedscale wt obtained during initial visit by  pounds. Suggest wt loss of 49 pounds/26% body wt loss.  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     Estimated energy needs:  Current body wt for EER based on clinician judgment   Adjust for age, malnutrition, EF, Vent, S/p OR; fluids per team  25-30kcal/k-1950kcal/day   1.4-1.6gm/k-104gm prot/day     Subjective: 78 y/o M with Significant PMHx of IVDU found unresponsive at his nursing home, resolved after Narcan in the field, and brought to [facility]. Found to have PE, atrial flutter, and large LV thrombus on echo. Transferred to [facility] for further management. Pt C/o abdominal pain on 12/10 CTA showing mid SMA with embolus. Abnormal distal small bowel loops and cecum with dilatation and pneumatosis suggesting infarcted bowel. One or two tiny foci of intrahepatic portal vein pneumatosis. Segmental infarction upper pole left kidney. Now s/p Exlap Embolectomy of SMA and resection of 80cm of SB pt left in discontinuity and transferred to SICU intubated . S/p Exploratory laparotomy with small bowel resection . S/p Small bowel resection with anastomosis, Closure of fascia of abdomen 12/15.     Pt had been in OR during RD visit. Ordered for aMIOdarone, Fentanyl, heparin, midazolam. RN confirms pt remains vented; VC/AC per flow sheets. . Per RN, +OGT for meds. Team reports possible plan for TPN s/p OR.  Labs: POCT 99 80 88, Na K MG WDL, Phos Low 1.7, , Bili 1.8, BUN/Cr WDL, AST SGOT 59.  Please see below for nutritions recommendations.  Spoke with team.     Prior PES: Malnutrition Severe in Chronic state RT presumed intake<EER AEB NFPE, wt loss, PO intake  >>on going  Goal: Pt will meet at least 75% of nutrient needs via feasible route / Show no further s/s of malnutrition    Recommendations:  1. Should TPN to begin consider the following:  >> 223g Dex, 91g AA, 50g 20% Lipids to provide in total 1622Kcal, 91g protein, 2.4GIR (based on 65kg), 0.9gm prot based on IBW vs 1.4gm prot based on current body wt. Recommend checking TG before starting TPN and then check TG weekly. Check Mg, Phos, K daily and POC BG Q6hrs. Trend daily weights. Fluids and lytes per MD discretion. Start at 150g Dex on Day 1, 250g Dex on Day 2, and advance to goal of g Dex on Day 3.   2. Pain/GI per team.  3. Monitor Skin, Wt, GOC.  4. Labs: monitor BMP, CBC, glucose, lytes, trend renal indices, LFTs, POCT, lipids/TG, lactate; MAP.   5. RD to remain available for additional nutrition interventions as needed.     Education: NA    Risk Level: High [X   ] Moderate [   ] Low [   ].

## 2022-12-15 NOTE — PROGRESS NOTE ADULT - ASSESSMENT
77 year old male with no documented past medical history presenting from [facility] after being found unresponsive at his nursing home s/p narcan with resolution. Found to have atrial flutter. Echo showed severely reduced LV function with an LV thrombus. Heparin gtt was started and pt transferred to [facility] for LORENZO and possible ablation. Vascular consulted for cold leg. Bilateral Duplex (12/8) showed acute thrombus completely occluding the left popliteal artery and acute thrombus incompletely occluding the left dorsalis pedis artery and likely acute thrombus completely occluding the left mid superficial femoral artery and extending to the left popliteal artery. Left DP with absent signals. Patient noted to have acute abdominal pain overnight with bloody diarrhea c/f acute mesenteric ischemia and taken emergently to the OR. Now POD2 s/p ex lap, SMA embolectomy, and SBR (80 cm), POD 1 from second look with additional small bowel resection (50cm) with wound left open (abthera in place) with plans for second look today. No need mesenteric angiogram today as patient is off pressors and hemodynamically stable.     Plan:   - Recommend continued hep gtt to goal of PTT    - Rest of care per SICU  - Vascular surgery Team 3C will continue to follow. Please call [phone] with any questions or concerns.      77 year old male with no documented past medical history presenting from [facility] after being found unresponsive at his nursing home s/p narcan with resolution. Found to have atrial flutter. Echo showed severely reduced LV function with an LV thrombus. Heparin gtt was started and pt transferred to [facility] for LORENZO and possible ablation. Vascular consulted for cold leg. Bilateral Duplex (12/8) showed acute thrombus completely occluding the left popliteal artery and acute thrombus incompletely occluding the left dorsalis pedis artery and likely acute thrombus completely occluding the left mid superficial femoral artery and extending to the left popliteal artery. Left DP with absent signals. Patient noted to have acute abdominal pain overnight with bloody diarrhea c/f acute mesenteric ischemia and taken emergently to the OR. Now POD2 s/p ex lap, SMA embolectomy, and SBR (80 cm), POD 1 from second look with additional small bowel resection (50cm) with wound left open (abthera in place) with plans for 3rd look today. No need for mesenteric angiogram today as patient is off pressors and hemodynamically stable.     Plan:   - Recommend continued hep gtt to goal of PTT    - Rest of care per SICU  - Vascular surgery Team 3C will continue to follow. Please call [phone] with any questions or concerns.

## 2022-12-16 NOTE — PROGRESS NOTE ADULT - SUBJECTIVE AND OBJECTIVE BOX
Patient is a 77y Male seen and evaluated at bedside. no acute events overnight uop 1.5L/24 hours Cr 0.62 patient remains intubated sedated however no longer on pressor support K 4.3 bicarb 25      Meds:    albuterol/ipratropium for Nebulization 3 every 6 hours  aMIOdarone Infusion 0.501 <Continuous>  aspirin Suppository 150 daily  chlorhexidine 0.12% Liquid 15 every 12 hours  chlorhexidine 2% Cloths 1 <User Schedule>  dexMEDEtomidine Infusion 0.1 <Continuous>  dextrose 5%. 1000 <Continuous>  dextrose 5%. 1000 <Continuous>  dextrose 50% Injectable 25 once  dextrose 50% Injectable 12.5 once  dextrose 50% Injectable 25 once  dextrose Oral Gel 15 once PRN  fentaNYL   Infusion. 0.5 <Continuous>  glucagon  Injectable 1 once  heparin  Infusion 800 <Continuous>  influenza  Vaccine (HIGH DOSE) 0.7 once  insulin lispro (ADMELOG) corrective regimen sliding scale  every 6 hours  midazolam Infusion 0.02 <Continuous>  pantoprazole  Injectable 40 two times a day  piperacillin/tazobactam IVPB.. 4.5 every 8 hours  sodium chloride 0.9%. 1000 <Continuous>      T(C): , Max: 38.7 (12-15-22 @ 20:20)  T(F): , Max: 101.6 (12-15-22 @ 20:20)  HR: 90 (12-16-22 @ 10:00)  BP: 117/92 (12-16-22 @ 10:00)  BP(mean): 112 (12-16-22 @ 09:00)  RR: 13 (12-16-22 @ 10:00)  SpO2: 98% (12-16-22 @ 10:00)  Wt(kg): --    12-15 @ 07:01  -  12-16 @ 07:00  --------------------------------------------------------  IN: 3564.3 mL / OUT: 1500 mL / NET: 2064.3 mL    12-16 @ 07:01  -  12-16 @ 10:22  --------------------------------------------------------  IN: 223.1 mL / OUT: 40 mL / NET: 183.1 mL          Review of Systems:  unable to participate      PHYSICAL EXAM:  GENERAL: intubated; sedated   CHEST/LUNG: mechanical breath sounds BL  HEART: normal S1S2, RRR  ABDOMEN: Soft, Nontender, +BS, No flank tenderness bilateral  EXTREMITIES: No clubbing, cyanosis, or edema   NEUROLOGY: intubated; sedated  SKIN: no notable lesions or rashes       LABS:                        10.2   11.88 )-----------( 272      ( 16 Dec 2022 05:26 )             31.9     12-16    136  |  106  |  9   ----------------------------<  103<H>  4.2   |  24  |  0.62    Ca    7.8<L>      16 Dec 2022 05:26  Phos  2.2     12-16  Mg     2.2     12-16    TPro  5.4<L>  /  Alb  2.1<L>  /  TBili  1.9<H>  /  DBili  1.4<H>  /  AST  60<H>  /  ALT  26  /  AlkPhos  100  12-16      PT/INR - ( 16 Dec 2022 05:26 )   PT: 78.2 sec;   INR: 6.45          PTT - ( 16 Dec 2022 05:26 )  PTT:78.4 sec          RADIOLOGY & ADDITIONAL STUDIES:

## 2022-12-16 NOTE — PROGRESS NOTE ADULT - ATTENDING COMMENTS
I agree with the fellow's findings and plans as written above with the following additions/amendments:    Seen and examined at bedside. Intubated, sedated, sCr stable. Monitoring closely, no electrolyte abnormalities, monitor phos closely as downtrending. Further recs as above

## 2022-12-16 NOTE — PROGRESS NOTE ADULT - SUBJECTIVE AND OBJECTIVE BOX
SUBJECTIVE: Pt seen and examined at bedside this am. Intubated, sedated, off pressors    MEDICATIONS  (STANDING):  albuterol/ipratropium for Nebulization 3 milliLiter(s) Nebulizer every 6 hours  aMIOdarone Infusion 0.501 mG/Min (16.7 mL/Hr) IV Continuous <Continuous>  aspirin Suppository 150 milliGRAM(s) Rectal daily  chlorhexidine 0.12% Liquid 15 milliLiter(s) Oral Mucosa every 12 hours  chlorhexidine 2% Cloths 1 Application(s) Topical <User Schedule>  dexMEDEtomidine Infusion 0.1 MICROgram(s)/kG/Hr (1.61 mL/Hr) IV Continuous <Continuous>  dextrose 5% + lactated ringers. 1000 milliLiter(s) (100 mL/Hr) IV Continuous <Continuous>  dextrose 5%. 1000 milliLiter(s) (100 mL/Hr) IV Continuous <Continuous>  dextrose 5%. 1000 milliLiter(s) (50 mL/Hr) IV Continuous <Continuous>  dextrose 50% Injectable 25 Gram(s) IV Push once  dextrose 50% Injectable 12.5 Gram(s) IV Push once  dextrose 50% Injectable 25 Gram(s) IV Push once  fentaNYL   Infusion. 0.5 MICROgram(s)/kG/Hr (3.22 mL/Hr) IV Continuous <Continuous>  glucagon  Injectable 1 milliGRAM(s) IntraMuscular once  heparin  Infusion 800 Unit(s)/Hr (8 mL/Hr) IV Continuous <Continuous>  influenza  Vaccine (HIGH DOSE) 0.7 milliLiter(s) IntraMuscular once  insulin lispro (ADMELOG) corrective regimen sliding scale   SubCutaneous every 6 hours  pantoprazole  Injectable 40 milliGRAM(s) IV Push two times a day  piperacillin/tazobactam IVPB.. 4.5 Gram(s) IV Intermittent every 8 hours    MEDICATIONS  (PRN):  dextrose Oral Gel 15 Gram(s) Oral once PRN Blood Glucose LESS THAN 70 milliGRAM(s)/deciliter      Vital Signs Last 24 Hrs  T(C): 36.9 (16 Dec 2022 09:00), Max: 38.7 (15 Dec 2022 20:20)  T(F): 98.4 (16 Dec 2022 09:00), Max: 101.6 (15 Dec 2022 20:20)  HR: 98 (16 Dec 2022 13:00) (87 - 117)  BP: 115/73 (16 Dec 2022 13:00) (91/60 - 141/92)  BP(mean): 90 (16 Dec 2022 13:00) (66 - 112)  RR: 16 (16 Dec 2022 13:00) (12 - 25)  SpO2: 100% (16 Dec 2022 13:00) (93% - 100%)    Parameters below as of 16 Dec 2022 13:00  Patient On (Oxygen Delivery Method): ventilator        Physical Exam  General: NAD, sedated in bed  Pulmonary: Nonlabored breathing, intubated  CV: NSR  Abd: soft, ileostomy pink with red rubber in place, ostomy bag with liquid output  Extremities: (-) edema, warm, well-perfused, no L DP/PT signals, R PT mono, no R DP, discoloration of the left calf      I&O's Detail    15 Dec 2022 07:01  -  16 Dec 2022 07:00  --------------------------------------------------------  IN:    Amiodarone: 350.7 mL    FentaNYL: 170.7 mL    Heparin: 56 mL    Heparin: 119 mL    IV PiggyBack: 175 mL    IV PiggyBack: 100 mL    Midazolam: 92.9 mL    sodium chloride 0.9%: 2500 mL  Total IN: 3564.3 mL    OUT:    Indwelling Catheter - Urethral (mL): 1500 mL  Total OUT: 1500 mL    Total NET: 2064.3 mL      16 Dec 2022 07:01  -  16 Dec 2022 14:06  --------------------------------------------------------  IN:    Amiodarone: 83.5 mL    Dexmedetomidine: 6.4 mL    FentaNYL: 6.4 mL    FentaNYL: 25.6 mL    Heparin: 40 mL    IV PiggyBack: 25 mL    IV PiggyBack: 62.5 mL    Midazolam: 4.5 mL    sodium chloride 0.9%: 500 mL  Total IN: 753.9 mL    OUT:    Indwelling Catheter - Urethral (mL): 340 mL  Total OUT: 340 mL    Total NET: 413.9 mL          LABS:                        10.2   11.88 )-----------( 272      ( 16 Dec 2022 05:26 )             31.9     12-16    136  |  106  |  9   ----------------------------<  103<H>  4.2   |  24  |  0.62    Ca    7.8<L>      16 Dec 2022 05:26  Phos  2.2     12-16  Mg     2.2     12-16    TPro  5.4<L>  /  Alb  2.1<L>  /  TBili  1.9<H>  /  DBili  1.4<H>  /  AST  60<H>  /  ALT  26  /  AlkPhos  100  12-16    PT/INR - ( 16 Dec 2022 05:26 )   PT: 78.2 sec;   INR: 6.45          PTT - ( 16 Dec 2022 05:26 )  PTT:78.4 sec      RADIOLOGY & ADDITIONAL STUDIES:

## 2022-12-16 NOTE — PROGRESS NOTE ADULT - ASSESSMENT
77 year old male with no documented past medical history presenting from [facility] after being found unresponsive at his nursing home s/p narcan with resolution. Found to have atrial flutter. Echo showed severely reduced LV function with an LV thrombus. Heparin gtt was started and pt transferred to [facility] for LORENZO and possible ablation. Vascular consulted for cold leg. Bilateral Duplex (12/8) showed acute thrombus completely occluding the left popliteal artery and acute thrombus incompletely occluding the left dorsalis pedis artery and likely acute thrombus completely occluding the left mid superficial femoral artery and extending to the left popliteal artery. Left DP with absent signals. Patient noted to have acute abdominal pain overnight with bloody diarrhea c/f acute mesenteric ischemia and taken emergently to the OR. Now POD2 s/p ex lap, SMA embolectomy, and SBR (80 cm), POD 1 from second look with additional small bowel resection (50cm) with wound left open (abthera in place) with plans for second look today. No need mesenteric angiogram today as patient is off pressors and hemodynamically stable.     Plan:   - Recommend continued hep gtt to goal of PTT    - Rest of care per SICU  - Vascular surgery Team 3C will continue to follow. Please call [phone] with any questions or concerns.    78 y/o M with Significant PMHx of IVDU found unresponsive at his nursing home, resolved after Narcan in the field, and brought to [facility]. Found to have PE, atrial flutter, and large LV thrombus on echo. Transferred to [facility] for further management. Pt C/o abdominal pain on 12/10 CTA showing mid SMA with embolus. Abnormal distal small bowel loops and cecum with dilatation and pneumatosis suggesting infarcted bowel. One or two tiny foci of  intrahepatic portal vein pneumatosis. Segmental infarction upper pole left kidney. Now s/p Ex lap, SMA embolectomy, 80cm SBR, abthera vac left in discontinuity (12/11) and transferred to SICU intubated. S/p second look (12/13) and most recently s/p OR for 3rd look, end-to-end anastomosis of remaining bowel, loop ileostomy and abdomen closure (12/15). Remains in SICU intubated with acute limb ischemia to LLE pending amputation. Patient is off pressors and hemodynamically stable.       Plan:   - Recommend continued hep gtt to goal of PTT    - Rest of care per SICU  - Vascular surgery Team 3C will continue to follow. Please call [phone] with any questions or concerns.

## 2022-12-16 NOTE — PROGRESS NOTE ADULT - ASSESSMENT
Patient is a 76 y/o male with no significant past medical history BIBA as patient was found down at his nursing home and initially taken to OSH where he was found to have RUL segmental PE and transferred to CCU where he was found to have an LV thrombus ; hospital course c/b acute mesenteric ischemia. Nephrology consulted for elevated creatinine.     Assessment/Plan:   #non-oliguric GOLDIE on CKD likely of pre-renal etiology i/s/o ongoing insensible losses and intravascular depletion  FeNA suggestive of pre-renal etiology  improving w/ IVF administration    Recommend:   daily BMP   strict I/Os   renal diet   maintain MAP >70    Hyponatremia-resolved    Metabolic Acidosis-resolved    nephrology will sign off please reconsult if needed     Aura Gan D.O  PGY-5, Nephrology Fellow   P: [phone] Patient is a 76 y/o male with no significant past medical history BIBA as patient was found down at his nursing home and initially taken to OSH where he was found to have RUL segmental PE and transferred to CCU where he was found to have an LV thrombus ; hospital course c/b acute mesenteric ischemia. Nephrology consulted for elevated creatinine.     Assessment/Plan:   #non-oliguric GOLDIE on CKD likely of pre-renal etiology i/s/o ongoing insensible losses and intravascular depletion  FeNA suggestive of pre-renal etiology  improving w/ IVF administration    Recommend:   daily BMP   strict I/Os   renal diet   maintain MAP >70    Hyponatremia-resolved    Metabolic Acidosis-resolved        Aura Gan D.O  PGY-5, Nephrology Fellow   P: [phone]

## 2022-12-16 NOTE — PROGRESS NOTE ADULT - ASSESSMENT
78 y/o M with Significant PMHx of IVDU found unresponsive at his nursing home, resolved after Narcan in the field, and brought to [facility]. Found to have PE, atrial flutter, and large LV thrombus on echo. Transferred to [facility] for further management. Pt C/o abdominal pain on 12/10 CTA showing mid SMA with embolus. Abnormal distal small bowel loops and cecum with dilatation and pneumatosis suggesting infarcted bowel. One or two tiny foci of  intrahepatic portal vein pneumatosis. Segmental infarction upper pole left kidney. Now s/p Exlap Embolectomy of SMA and resection of 80cm of SB pt left in discontinuity and transferred to SICU intubated EBL 20cc, 1.5l IVF, uo 150 OVER 2.5 HR. taken back to OR for second look. Now s/p OR for 3rd look, end-to-end anastomosis of remaining bowel, loop ileostomy and abdomen closure.       Neuro: Precedex, plan to wean Versed and fentanyl   CV: Septic shock Now off Pressors. Goal MAP ( > 65)  Cont Amio gt for Aflutter s/p Dig CHF EF 15% Left LV thrombus: Cont heparin( 60-80) Hold Nipride for hypotension, ASA 150mg Supp.   Pulm: Satting well on vent 40%/520/12/5   GI: NPO NGT to LIWS.   : Uriarte Infarction of upper pole of Left Kidney, Renal US negative on 12/12.   ID: Zosyn ( 12/11-) Hep C Positive. D/c: Vanco  ( 12/11-12/12)   Endo: ISS  PPx: SCD Heparin gtt  Lines: Rt TLC ( 12/11-)  Wounds: abdomen closed, RLQ loop ileostomy, LLE showing signs of demarcation   PT/OT: not ordered Strict bedrest  Dispo: SICU

## 2022-12-16 NOTE — PROGRESS NOTE ADULT - SUBJECTIVE AND OBJECTIVE BOX
INTERVAL/OVERNIGHT EVENTS:: 6pm PTT- 66.8, PM CK 1773. PTT at 2200 81.6 --> Hep gtt moved to 8. CK downt o 1642 (not repeated    SUBJECTIVE: Patient seen at bedside, intubated and sedated. Remains off pressors since 12/14 AM.      Neurologic Medications  aspirin Suppository 150 milliGRAM(s) Rectal daily  dexMEDEtomidine Infusion 0.1 MICROgram(s)/kG/Hr IV Continuous <Continuous>  fentaNYL   Infusion. 0.5 MICROgram(s)/kG/Hr IV Continuous <Continuous>  midazolam Infusion 0.02 mG/kG/Hr IV Continuous <Continuous>    Respiratory Medications  albuterol/ipratropium for Nebulization 3 milliLiter(s) Nebulizer every 6 hours    Cardiovascular Medications  aMIOdarone Infusion 0.501 mG/Min IV Continuous <Continuous>    Gastrointestinal Medications  dextrose 5%. 1000 milliLiter(s) IV Continuous <Continuous>  dextrose 5%. 1000 milliLiter(s) IV Continuous <Continuous>  pantoprazole  Injectable 40 milliGRAM(s) IV Push two times a day  sodium chloride 0.9%. 1000 milliLiter(s) IV Continuous <Continuous>    Genitourinary Medications    Hematologic/Oncologic Medications  heparin  Infusion 800 Unit(s)/Hr IV Continuous <Continuous>  influenza  Vaccine (HIGH DOSE) 0.7 milliLiter(s) IntraMuscular once    Antimicrobial/Immunologic Medications  piperacillin/tazobactam IVPB.. 4.5 Gram(s) IV Intermittent every 8 hours    Endocrine/Metabolic Medications  dextrose 50% Injectable 25 Gram(s) IV Push once  dextrose 50% Injectable 12.5 Gram(s) IV Push once  dextrose 50% Injectable 25 Gram(s) IV Push once  dextrose Oral Gel 15 Gram(s) Oral once PRN Blood Glucose LESS THAN 70 milliGRAM(s)/deciliter  glucagon  Injectable 1 milliGRAM(s) IntraMuscular once  insulin lispro (ADMELOG) corrective regimen sliding scale   SubCutaneous every 6 hours    Topical/Other Medications  chlorhexidine 0.12% Liquid 15 milliLiter(s) Oral Mucosa every 12 hours  chlorhexidine 2% Cloths 1 Application(s) Topical <User Schedule>      MEDICATIONS  (PRN):  dextrose Oral Gel 15 Gram(s) Oral once PRN Blood Glucose LESS THAN 70 milliGRAM(s)/deciliter      I&O's Detail    15 Dec 2022 07:01  -  16 Dec 2022 07:00  --------------------------------------------------------  IN:    Amiodarone: 350.7 mL    FentaNYL: 170.7 mL    Heparin: 119 mL    Heparin: 56 mL    IV PiggyBack: 175 mL    IV PiggyBack: 100 mL    Midazolam: 92.9 mL    sodium chloride 0.9%: 2500 mL  Total IN: 3564.3 mL    OUT:    Indwelling Catheter - Urethral (mL): 1500 mL  Total OUT: 1500 mL    Total NET: 2064.3 mL      16 Dec 2022 07:01  -  16 Dec 2022 10:35  --------------------------------------------------------  IN:    Amiodarone: 16.7 mL    FentaNYL: 6.4 mL    Heparin: 8 mL    IV PiggyBack: 62.5 mL    IV PiggyBack: 25 mL    Midazolam: 4.5 mL    sodium chloride 0.9%: 100 mL  Total IN: 223.1 mL    OUT:    Indwelling Catheter - Urethral (mL): 40 mL  Total OUT: 40 mL    Total NET: 183.1 mL          Vital Signs Last 24 Hrs  T(C): 36.9 (16 Dec 2022 09:00), Max: 38.7 (15 Dec 2022 20:20)  T(F): 98.4 (16 Dec 2022 09:00), Max: 101.6 (15 Dec 2022 20:20)  HR: 90 (16 Dec 2022 10:00) (90 - 117)  BP: 117/92 (16 Dec 2022 10:00) (91/60 - 141/92)  BP(mean): 112 (16 Dec 2022 09:00) (66 - 114)  RR: 13 (16 Dec 2022 10:00) (12 - 22)  SpO2: 98% (16 Dec 2022 10:00) (92% - 100%)    Parameters below as of 16 Dec 2022 10:00  Patient On (Oxygen Delivery Method): ventilator        Gen: NAD   Neuro: Sedated intubated  HEENT: ETT in place OGT in place draining minimal brown fluid  CV: RRR reg s1s2 no M  Pulm: CTA B/L no w/w/r  Abd: Soft, abdomen closed with staples End ileostomy in RLQ, stoma with pink healthy tissue, bowel sweat in bag.   : Uriarte in place draining straw colored urine  Ext: No C/C/E Extremities Cool to touch. No dopplerable signals in L foot. Left leg showing signs of demarcation just below knee, unchanged from prior exams.  Skin: No rashes erythema or ecchymosis  MSK: No joint swelling noted  Psych: unable to assess    LABS:                        10.2   11.88 )-----------( 272      ( 16 Dec 2022 05:26 )             31.9     12-16    136  |  106  |  9   ----------------------------<  103<H>  4.2   |  24  |  0.62    Ca    7.8<L>      16 Dec 2022 05:26  Phos  2.2     12-16  Mg     2.2     12-16    TPro  5.4<L>  /  Alb  2.1<L>  /  TBili  1.9<H>  /  DBili  1.4<H>  /  AST  60<H>  /  ALT  26  /  AlkPhos  100  12-16    PT/INR - ( 16 Dec 2022 05:26 )   PT: 78.2 sec;   INR: 6.45          PTT - ( 16 Dec 2022 05:26 )  PTT:78.4 sec      RADIOLOGY & ADDITIONAL STUDIES:      Culture - Blood (collected 12-11-22 @ 13:13)  Source: .Blood Blood-Peripheral  Preliminary Report (12-15-22 @ 14:00):    No growth at 4 days.    Culture - Blood (collected 12-11-22 @ 13:13)  Source: .Blood Blood-Peripheral  Preliminary Report (12-15-22 @ 14:00):    No growth at 4 days.

## 2022-12-17 NOTE — PROGRESS NOTE ADULT - ASSESSMENT
Assessment: 76 y/o M with Significant PMHx of IVDU found unresponsive at his nursing home, resolved after Narcan in the field, and brought to [facility]. Found to have PE, atrial flutter, and large LV thrombus on echo. Transferred to [facility] for further management. Pt C/o abdominal pain on 12/10 CTA showing mid SMA with embolus. Abnormal distal small bowel loops and cecum with dilatation and pneumatosis suggesting infarcted bowel. One or two tiny foci of  intrahepatic portal vein pneumatosis. Segmental infarction upper pole left kidney. Now s/p Exlap Embolectomy of SMA and resection of 80cm of SB pt left in discontinuity and transferred to SICU intubated. S/p second look on 12/14 and most recently s/p OR for 3rd look, end-to-end anastomosis of remaining bowel, loop ileostomy and abdomen closure. Remains in SICU intubated, plan to wean to extubate today.     Neuro: Fentanyl patch 50mcg q48h; IV Tylenol ATC, PRN Dilaudid   CV: Septic shock- resolved; AFlutter s/p Amio gtt and Digoxin; CHF- LVEF 15%, LV thrombus, LLE limb ischemia- Heparin gtt (goal PTT 60-90),  MI QD.   Pulm: Extubated to RA on 12/17, IS  GI: NPO, D5LR @100/hr  : Uriarte- Infarction of upper pole of Left Kidney, Renal US negative on 12/12.   ID: Ischemic bowel: Zosyn (12/11-)    Heme: Hep C positive; Active T&S  Endo: mISS  PPx: SCD, Heparin gtt (PTT goal 60-90)   Lines: Rt TLC (12/11-12/17), L radial connie (12/11--), PIVs   Wounds: abdomen closed, RLQ loop ileostomy, LLE showing signs of demarcation   PT/OT: Ordered 12/17  Dispo: SICU

## 2022-12-17 NOTE — PROGRESS NOTE ADULT - NS ATTEND OPT1 GEN_ALL_CORE
I attest my time as attending is greater than 50% of the total combined time spent on qualifying patient care activities by the PA/NP and attending. remove lidopatch at 11:30am

## 2022-12-17 NOTE — PROGRESS NOTE ADULT - ASSESSMENT
Patient is a 78 y/o male with no significant past medical history BIBA as patient was found down at his nursing home and initially taken to OSH where he was found to have RUL segmental PE and transferred to CCU where he was found to have an LV thrombus ; hospital course c/b acute mesenteric ischemia. Nephrology consulted for elevated creatinine.     Assessment/Plan:   #non-oliguric GOLDIE on CKD likely of pre-renal etiology i/s/o ongoing insensible losses and intravascular depletion  FeNA suggestive of pre-renal etiology  improving w/ IVF administration  SCr stable    Recommend:   daily BMP   strict I/Os   renal diet   maintain MAP >70    Hypophosphatemia  Replete phos to maintain >3  Hyponatremia-resolved    Metabolic Acidosis-resolved      Nephrology will sign off at this time. Please feel free to reach out to us with any questions or concerns.

## 2022-12-17 NOTE — PROGRESS NOTE ADULT - SUBJECTIVE AND OBJECTIVE BOX
STATUS POST:      POST OPERATIVE DAY #:     SUBJECTIVE: Pt seen and examined at bedside this am by surgery team. Patient is sedated and intubated in ICU. Pressers restarted overnight and OGT placed.     MEDICATIONS  (STANDING):  albuterol/ipratropium for Nebulization 3 milliLiter(s) Nebulizer every 6 hours  aMIOdarone Infusion 0.501 mG/Min (16.7 mL/Hr) IV Continuous <Continuous>  aspirin Suppository 150 milliGRAM(s) Rectal daily  chlorhexidine 0.12% Liquid 15 milliLiter(s) Oral Mucosa every 12 hours  chlorhexidine 2% Cloths 1 Application(s) Topical <User Schedule>  dexMEDEtomidine Infusion 0.1 MICROgram(s)/kG/Hr (1.61 mL/Hr) IV Continuous <Continuous>  dextrose 5% + lactated ringers. 1000 milliLiter(s) (100 mL/Hr) IV Continuous <Continuous>  dextrose 5%. 1000 milliLiter(s) (50 mL/Hr) IV Continuous <Continuous>  dextrose 5%. 1000 milliLiter(s) (100 mL/Hr) IV Continuous <Continuous>  dextrose 50% Injectable 25 Gram(s) IV Push once  dextrose 50% Injectable 12.5 Gram(s) IV Push once  dextrose 50% Injectable 25 Gram(s) IV Push once  fentaNYL   Infusion. 0.5 MICROgram(s)/kG/Hr (3.22 mL/Hr) IV Continuous <Continuous>  fentaNYL   Patch  50 MICROgram(s)/Hr. 1 Patch Transdermal every 48 hours  glucagon  Injectable 1 milliGRAM(s) IntraMuscular once  heparin  Infusion 800 Unit(s)/Hr (8 mL/Hr) IV Continuous <Continuous>  influenza  Vaccine (HIGH DOSE) 0.7 milliLiter(s) IntraMuscular once  insulin lispro (ADMELOG) corrective regimen sliding scale   SubCutaneous every 6 hours  lactated ringers Bolus 1000 milliLiter(s) IV Bolus once  norepinephrine Infusion 0.05 MICROgram(s)/kG/Min (6.04 mL/Hr) IV Continuous <Continuous>  pantoprazole  Injectable 40 milliGRAM(s) IV Push two times a day  piperacillin/tazobactam IVPB.. 4.5 Gram(s) IV Intermittent every 8 hours    MEDICATIONS  (PRN):  dextrose Oral Gel 15 Gram(s) Oral once PRN Blood Glucose LESS THAN 70 milliGRAM(s)/deciliter      Vital Signs Last 24 Hrs  T(C): 36.3 (17 Dec 2022 09:09), Max: 37.6 (16 Dec 2022 21:00)  T(F): 97.3 (17 Dec 2022 09:09), Max: 99.7 (16 Dec 2022 21:00)  HR: 57 (17 Dec 2022 13:00) (54 - 119)  BP: 130/87 (17 Dec 2022 12:00) (76/48 - 158/100)  BP(mean): 103 (17 Dec 2022 12:00) (57 - 121)  RR: 13 (17 Dec 2022 13:00) (12 - 27)  SpO2: 98% (17 Dec 2022 13:00) (96% - 100%)    Parameters below as of 17 Dec 2022 13:00  Patient On (Oxygen Delivery Method): ventilator    O2 Concentration (%): 40    Physical Exam  General: Patient intubated and sedated  Pulm: Intubated  CV: normal sinus rhythm, on pressors  Abd: Soft, nondistended, OLGA noted to be light SS, prevena vac midline with SS  Extremities: no edema    I&O's Detail    16 Dec 2022 07:01  -  17 Dec 2022 07:00  --------------------------------------------------------  IN:    Amiodarone: 350.7 mL    Dexmedetomidine: 70.3 mL    dextrose 5% + lactated ringers: 1900 mL    FentaNYL: 6.4 mL    FentaNYL: 132.8 mL    Heparin: 192 mL    IV PiggyBack: 62.5 mL    IV PiggyBack: 125 mL    Midazolam: 4.5 mL    Norepinephrine: 7.2 mL    sodium chloride 0.9%: 500 mL    Sodium Chloride 0.9% Bolus: 1000 mL  Total IN: 4351.4 mL    OUT:    Ileostomy (mL): 250 mL    Indwelling Catheter - Urethral (mL): 1062 mL    Nasogastric/Oral tube (mL): 125 mL  Total OUT: 1437 mL    Total NET: 2914.4 mL      17 Dec 2022 07:01  -  17 Dec 2022 13:11  --------------------------------------------------------  IN:    Dexmedetomidine: 59.3 mL    dextrose 5% + lactated ringers: 500 mL    FentaNYL: 6.4 mL    Heparin: 16 mL    Heparin: 24 mL    IV PiggyBack: 50 mL    IV PiggyBack: 166.6 mL    Norepinephrine: 14.4 mL  Total IN: 836.7 mL    OUT:    Amiodarone: 0 mL    Indwelling Catheter - Urethral (mL): 90 mL  Total OUT: 90 mL    Total NET: 746.7 mL        LABS:                        8.3    10.59 )-----------( 258      ( 17 Dec 2022 05:30 )             25.8     12-17    138  |  109<H>  |  9   ----------------------------<  135<H>  3.7   |  24  |  0.70    Ca    7.7<L>      17 Dec 2022 05:30  Phos  1.5     12-17  Mg     2.1     12-17    TPro  5.4<L>  /  Alb  2.1<L>  /  TBili  1.9<H>  /  DBili  1.4<H>  /  AST  60<H>  /  ALT  26  /  AlkPhos  100  12-16    PT/INR - ( 17 Dec 2022 05:30 )   PT: 74.5 sec;   INR: 6.15          PTT - ( 17 Dec 2022 05:30 )  PTT:82.5 sec  Urinalysis Basic - ( 17 Dec 2022 11:23 )    Color: Yellow / Appearance: Clear / SG: >=1.030 / pH: x  Gluc: x / Ketone: NEGATIVE  / Bili: Negative / Urobili: 0.2 E.U./dL   Blood: x / Protein: Trace mg/dL / Nitrite: NEGATIVE   Leuk Esterase: NEGATIVE / RBC: Many /HPF / WBC 5-10 /HPF   Sq Epi: x / Non Sq Epi: 0-5 /HPF / Bacteria: Present /HPF

## 2022-12-17 NOTE — PROGRESS NOTE ADULT - ATTENDING COMMENTS
Non-oliguric GOLDIE on CKD likely of pre-renal etiology.  Improved with IVF.  Case d/w fellow during rounds.  Agree with note above.   Supplement PO4 and continue to trend.

## 2022-12-17 NOTE — PROGRESS NOTE ADULT - SUBJECTIVE AND OBJECTIVE BOX
Patient is a 77y Male seen and evaluated at bedside. Remains intubated, sedated. K 3.7, bicarb 24, Cr stable. UO 1042ml, with net positive 2.9L/24 hours.       Meds:    acetaminophen   IVPB .. 1000 once  albuterol/ipratropium for Nebulization 3 every 6 hours  aspirin Suppository 150 daily  chlorhexidine 0.12% Liquid 15 every 12 hours  chlorhexidine 2% Cloths 1 <User Schedule>  dextrose 5% + lactated ringers. 1000 <Continuous>  dextrose 5%. 1000 <Continuous>  dextrose 5%. 1000 <Continuous>  dextrose 50% Injectable 25 once  dextrose 50% Injectable 12.5 once  dextrose 50% Injectable 25 once  dextrose Oral Gel 15 once PRN  fentaNYL   Patch  50 MICROgram(s)/Hr. 1 every 48 hours  glucagon  Injectable 1 once  heparin  Infusion 800 <Continuous>  HYDROmorphone  Injectable 0.5 every 3 hours PRN  HYDROmorphone  Injectable 1 every 3 hours PRN  influenza  Vaccine (HIGH DOSE) 0.7 once  insulin lispro (ADMELOG) corrective regimen sliding scale  every 6 hours  pantoprazole  Injectable 40 two times a day  piperacillin/tazobactam IVPB.. 4.5 every 8 hours      T(C): , Max: 37.6 (12-16-22 @ 21:00)  T(F): , Max: 99.7 (12-16-22 @ 21:00)  HR: 100 (12-17-22 @ 15:00)  BP: 130/87 (12-17-22 @ 12:00)  BP(mean): 103 (12-17-22 @ 12:00)  RR: 25 (12-17-22 @ 15:00)  SpO2: 97% (12-17-22 @ 15:00)  Wt(kg): --    12-16 @ 07:01  -  12-17 @ 07:00  --------------------------------------------------------  IN: 4351.4 mL / OUT: 1437 mL / NET: 2914.4 mL    12-17 @ 07:01  -  12-17 @ 16:27  --------------------------------------------------------  IN: 1519.3 mL / OUT: 225 mL / NET: 1294.3 mL          Review of Systems:  ROS negative except as per HPI      PHYSICAL EXAM:  GENERAL: intubated; sedated   CHEST/LUNG: mechanical breath sounds BL  HEART: normal S1S2, RRR  ABDOMEN: Soft, Nontender, +BS, No flank tenderness bilateral  EXTREMITIES: No clubbing, cyanosis, or edema   NEUROLOGY: intubated; sedated  SKIN: no notable lesions or rashes     LABS:                        8.3    10.59 )-----------( 258      ( 17 Dec 2022 05:30 )             25.8     12-17    138  |  109<H>  |  9   ----------------------------<  135<H>  3.7   |  24  |  0.70    Ca    7.7<L>      17 Dec 2022 05:30  Phos  1.5     12-17  Mg     2.1     12-17    TPro  5.4<L>  /  Alb  2.1<L>  /  TBili  1.9<H>  /  DBili  1.4<H>  /  AST  60<H>  /  ALT  26  /  AlkPhos  100  12-16      PT/INR - ( 17 Dec 2022 05:30 )   PT: 74.5 sec;   INR: 6.15          PTT - ( 17 Dec 2022 05:30 )  PTT:82.5 sec  Urinalysis Basic - ( 17 Dec 2022 11:23 )    Color: Yellow / Appearance: Clear / SG: >=1.030 / pH: x  Gluc: x / Ketone: NEGATIVE  / Bili: Negative / Urobili: 0.2 E.U./dL   Blood: x / Protein: Trace mg/dL / Nitrite: NEGATIVE   Leuk Esterase: NEGATIVE / RBC: Many /HPF / WBC 5-10 /HPF   Sq Epi: x / Non Sq Epi: 0-5 /HPF / Bacteria: Present /HPF      Sodium, Random Urine: 80 mmol/L (12-17 @ 11:23)  Creatinine, Random Urine: 120 mg/dL (12-17 @ 11:23)  Osmolality, Random Urine: 581 mosm/kg (12-17 @ 11:23)  Osmolality, Random Urine: 643 mosm/kg (12-17 @ 03:06)  Sodium, Random Urine: 73 mmol/L (12-17 @ 03:06)  Creatinine, Random Urine: 140 mg/dL (12-17 @ 03:06)        RADIOLOGY & ADDITIONAL STUDIES:

## 2022-12-17 NOTE — PROGRESS NOTE ADULT - ASSESSMENT
76yo M with no significant PMH/PSx admitted to cardiology service on 12/7 in the setting of severely reduced LV function 2/2 an LV thrombus. Pt now with several days of post-prandial abdominal pain and loose BMs, initially suspected to be 2/2 opioid withdrawal, however on evening of 12/10 had a large bloody BM. 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 Vascular surgery (already following) with plan for OR. General surgery consulted for possible operative assistance in the setting of bowel ischemia. Now POD2 s/p ex lap, SMA embolectomy and small bowel resection. Plan to RTOR today for second look.    Recommendations:  - Ween pressors as tolerated  - Continue vancomycin and zosyn  - Monitor UOP  - Monitor OLGA and vac output  - rest of care per SICU

## 2022-12-17 NOTE — PROGRESS NOTE ADULT - SUBJECTIVE AND OBJECTIVE BOX
24 Hour Events: Weaned from Versed to Precedex for sedation, attempt at SBT, however patient too somnolent, remained on precedex drip overnight. Overnight, patient with bradycardia, increased pain and agitation. Improved with versed the agitation, EKG with possible second degree AV block, Amiodarone infusion discontinued.       PAST MEDICAL & SURGICAL HISTORY:  IVDU    Allergies  No Known Allergies      MEDICATIONS  (STANDING):  acetaminophen   IVPB .. 1000 milliGRAM(s) IV Intermittent once  albuterol/ipratropium for Nebulization 3 milliLiter(s) Nebulizer every 6 hours  aspirin Suppository 150 milliGRAM(s) Rectal daily  chlorhexidine 0.12% Liquid 15 milliLiter(s) Oral Mucosa every 12 hours  chlorhexidine 2% Cloths 1 Application(s) Topical <User Schedule>  dextrose 5% + lactated ringers. 1000 milliLiter(s) (100 mL/Hr) IV Continuous <Continuous>  dextrose 5%. 1000 milliLiter(s) (50 mL/Hr) IV Continuous <Continuous>  dextrose 5%. 1000 milliLiter(s) (100 mL/Hr) IV Continuous <Continuous>  dextrose 50% Injectable 25 Gram(s) IV Push once  dextrose 50% Injectable 12.5 Gram(s) IV Push once  dextrose 50% Injectable 25 Gram(s) IV Push once  fentaNYL   Patch  50 MICROgram(s)/Hr. 1 Patch Transdermal every 48 hours  glucagon  Injectable 1 milliGRAM(s) IntraMuscular once  heparin  Infusion 800 Unit(s)/Hr (8 mL/Hr) IV Continuous <Continuous>  influenza  Vaccine (HIGH DOSE) 0.7 milliLiter(s) IntraMuscular once  insulin lispro (ADMELOG) corrective regimen sliding scale   SubCutaneous every 6 hours  pantoprazole  Injectable 40 milliGRAM(s) IV Push two times a day  piperacillin/tazobactam IVPB.. 4.5 Gram(s) IV Intermittent every 8 hours    MEDICATIONS  (PRN):  dextrose Oral Gel 15 Gram(s) Oral once PRN Blood Glucose LESS THAN 70 milliGRAM(s)/deciliter  HYDROmorphone  Injectable 0.5 milliGRAM(s) IV Push every 3 hours PRN Moderate Pain (4 - 6)  HYDROmorphone  Injectable 1 milliGRAM(s) IV Push every 3 hours PRN Severe Pain (7 - 10)      Physical Exam:   General: Cachetic elderly male, appears stated age, intubated in bed    Neuro: Grossly intact bilaterally   HEENT: Normocephalic, atraumatic, trachea midline, no JVD   Chest: Equal rise and fall of chest   Heart: Regular S1/S2, no murmurs rubs or gallops   Lungs: Unlabored breathing on ventilator; Clear to auscultation bilaterally, no adventitious sounds   Abdomen: Soft, non-distended, normoactive bowel sounds throughout, no tenderness to palpation in all 4 quadrants; RLQ ostomy pink with gas in bag, midline laparotomy incision is clean/dry/intact    Upper Extremities: Trace edema in hands, freely mobile bilaterally   Lower Extremities: No edema, SCDs in place; RLE warm, LLE cool with blistering from upper calf to toes   Skin: Warm, non-diaphoretic throughout       Labs:                         8.3    10.59 )-----------( 258      ( 17 Dec 2022 05:30 )             25.8     12-17    138  |  109<H>  |  9   ----------------------------<  135<H>  3.7   |  24  |  0.70    Ca    7.7<L>      17 Dec 2022 05:30  Phos  1.5     12-17  Mg     2.1     12-17    TPro  5.4<L>  /  Alb  2.1<L>  /  TBili  1.9<H>  /  DBili  1.4<H>  /  AST  60<H>  /  ALT  26  /  AlkPhos  100  12-16    CAPILLARY BLOOD GLUCOSE  POCT Blood Glucose.: 97 mg/dL (17 Dec 2022 16:48)  POCT Blood Glucose.: 145 mg/dL (17 Dec 2022 13:13)  POCT Blood Glucose.: 152 mg/dL (16 Dec 2022 23:49)  POCT Blood Glucose.: 119 mg/dL (16 Dec 2022 18:30)    CARDIAC MARKERS ( 17 Dec 2022 10:48 )  x     / 0.03 ng/mL / 1416 U/L / x     / 11.7 ng/mL  CARDIAC MARKERS ( 17 Dec 2022 05:30 )  x     / 0.04 ng/mL / 1412 U/L / x     / 10.7 ng/mL  CARDIAC MARKERS ( 16 Dec 2022 12:34 )  x     / x     / 1653 U/L / x     / x      CARDIAC MARKERS ( 16 Dec 2022 05:26 )  x     / x     / 1787 U/L / x     / x      CARDIAC MARKERS ( 15 Dec 2022 21:51 )  x     / x     / 1642 U/L / x     / x      CARDIAC MARKERS ( 15 Dec 2022 18:23 )  x     / x     / 1773 U/L / x     / x          PT/INR - ( 17 Dec 2022 05:30 )   PT: 74.5 sec;   INR: 6.15     PTT - ( 17 Dec 2022 05:30 )  PTT:82.5 sec    COVID-19 PCR: NotDetec (13 Dec 2022 07:34)  COVID-19 PCR: Negative (08 Dec 2022 18:50)  COVID-19 PCR: NotDetec (07 Dec 2022 12:11)    Urinalysis Basic - ( 17 Dec 2022 11:23 )  Color: Yellow / Appearance: Clear / SG: >=1.030 / pH: x  Gluc: x / Ketone: NEGATIVE  / Bili: Negative / Urobili: 0.2 E.U./dL   Blood: x / Protein: Trace mg/dL / Nitrite: NEGATIVE   Leuk Esterase: NEGATIVE / RBC: Many /HPF / WBC 5-10 /HPF   Sq Epi: x / Non Sq Epi: 0-5 /HPF / Bacteria: Present /HPF      Vital Signs:   Vital Signs Last 24 Hrs  T(C): 36.3 (17 Dec 2022 14:00), Max: 37.6 (16 Dec 2022 21:00)  T(F): 97.3 (17 Dec 2022 14:00), Max: 99.7 (16 Dec 2022 21:00)  HR: 92 (17 Dec 2022 16:00) (54 - 119)  BP: 130/87 (17 Dec 2022 12:00) (76/48 - 158/100)  BP(mean): 103 (17 Dec 2022 12:00) (57 - 121)  RR: 21 (17 Dec 2022 16:00) (12 - 27)  SpO2: 98% (17 Dec 2022 16:00) (96% - 100%)    Parameters below as of 17 Dec 2022 16:00  Patient On (Oxygen Delivery Method): room air  Mode: standby      Input/Output:   I&O's Detail    16 Dec 2022 07:01  -  17 Dec 2022 07:00  --------------------------------------------------------  IN:    Amiodarone: 350.7 mL    Dexmedetomidine: 70.3 mL    dextrose 5% + lactated ringers: 1900 mL    FentaNYL: 6.4 mL    FentaNYL: 132.8 mL    Heparin: 192 mL    IV PiggyBack: 62.5 mL    IV PiggyBack: 125 mL    Midazolam: 4.5 mL    Norepinephrine: 7.2 mL    sodium chloride 0.9%: 500 mL    Sodium Chloride 0.9% Bolus: 1000 mL  Total IN: 4351.4 mL    OUT:    Ileostomy (mL): 250 mL    Indwelling Catheter - Urethral (mL): 1062 mL    Nasogastric/Oral tube (mL): 125 mL  Total OUT: 1437 mL    Total NET: 2914.4 mL      17 Dec 2022 07:01  -  17 Dec 2022 16:53  --------------------------------------------------------  IN:    Dexmedetomidine: 65.3 mL    dextrose 5% + lactated ringers: 900 mL    FentaNYL: 6.4 mL    Heparin: 24 mL    Heparin: 48 mL    IV PiggyBack: 125 mL    IV PiggyBack: 333.2 mL    Norepinephrine: 17.4 mL  Total IN: 1519.3 mL    OUT:    Amiodarone: 0 mL    Indwelling Catheter - Urethral (mL): 225 mL  Total OUT: 225 mL    Total NET: 1294.3 mL        Daily     Daily

## 2022-12-17 NOTE — PROGRESS NOTE ADULT - NS ATTEND AMEND GEN_ALL_CORE FT
History as per above. Doing well on pressure support trial; extubate today. Rest of plan as per above. no

## 2022-12-18 NOTE — PHYSICAL THERAPY INITIAL EVALUATION ADULT - PERTINENT HX OF CURRENT PROBLEM, REHAB EVAL
78 y/o male with no significant past medical history BIBA from [facility] due to unresponsiveness. Patient was reportedly found unresponsive at his nursing home, Narcan was given in the field with resolution, and patient was taken to [facility]. Pt was persistently tachycardic in 130-140s despite receiving Adenosine 6mg IV and then 12mg IV, Lopressor 2.5mg x2, PO Metoprolol tartrate 100mg. Utox was positive for opioids. CTPA suggestive of subsegmental PE in the right upper lobe.

## 2022-12-18 NOTE — OCCUPATIONAL THERAPY INITIAL EVALUATION ADULT - GENERAL OBSERVATIONS, REHAB EVAL
OT IE Completed. Pt's JOHN Little cleared pt for therapy. Pt received semisupine in bed, +b/l wrist restraints, +1:1 observation, +tele, +b/l IV Infusing, +LLE gauze wrapping C/D/I, +bowen, room air, +L [place], +R SCD, agreeable to OT. PT Allis present. Pt tolerated session fairly well, all needs in reach, JOHN Little aware, BUE wrist restraints left detached with 1:1 PCA Glenton present and RN aware.

## 2022-12-18 NOTE — CONSULT NOTE ADULT - ASSESSMENT
76 y/o M with no significant PMHx found unresponsive at his nursing home, resolved after narcan in the field, and brought to [facility]. Found to have LV dysfunction (Tachy mediated), sub-segmental PE, atrial flutter, and large LV thrombus on echo. Transferred to [facility] for further management. Course complicated by acute mesenteric ischemia now s/p embolectomy and bowel resection, septic shock, LLE arterial occlusion. Cardiology consulted for mild troponin elevation.      #Troponin elevation  EKG nonischemic  No anginal equivalents  Troponin trend flat  Likely non-MI troponin vs less likely demand ischemia 2/2 multiple processes noted above  -No need to trend troponin further  -Repeat echo once Aflutter resolves (?possible DCCV once surgical issues clear)

## 2022-12-18 NOTE — OCCUPATIONAL THERAPY INITIAL EVALUATION ADULT - DIAGNOSIS, OT EVAL
Pt presents with decreased cognition, short term memory, safety awareness, functional endurance, BUE/BLE AROM/strength, and balance impacting his ability to independently complete ADLs, functional transfers, and functional mobility.

## 2022-12-18 NOTE — CONSULT NOTE ADULT - SUBJECTIVE AND OBJECTIVE BOX
HPI:  Patient is a 76 y/o male with no significant past medical history BIBA from [facility] due to unresponsiveness. Patient was reportedly found unresponsive at his nursing home, Narcan was given in the field with resolution, and patient was taken to [facility]. Pt was persistently tachycardic in 130-140s despite receiving Adenosine 6mg IV and then 12mg IV, Lopressor 2.5mg x2, PO Metoprolol tartrate 100mg. Utox was positive for opioids. CTPA suggestive of subsegmental PE in the right upper lobe. Echo showed severely reduced LV function with an LV thrombus. Heparin gtt was started. Cardiology and EP were consulted due to atrial flutter. EP suggested transfer to [facility] for LORENZO and possible ablation.  (07 Dec 2022 12:26)      ROS: A 10-point review of systems was otherwise negative.    PAST MEDICAL & SURGICAL HISTORY:      SOCIAL HISTORY:  FAMILY HISTORY:      ALLERGIES: 	  No Known Allergies            MEDICATIONS:  acetaminophen   IVPB .. 1000 milliGRAM(s) IV Intermittent once  albuterol/ipratropium for Nebulization 3 milliLiter(s) Nebulizer every 6 hours  aspirin Suppository 150 milliGRAM(s) Rectal daily  chlorhexidine 0.12% Liquid 15 milliLiter(s) Oral Mucosa every 12 hours  chlorhexidine 2% Cloths 1 Application(s) Topical <User Schedule>  dexMEDEtomidine Infusion 0.2 MICROgram(s)/kG/Hr IV Continuous <Continuous>  dextrose 5% + lactated ringers. 1000 milliLiter(s) IV Continuous <Continuous>  dextrose 5%. 1000 milliLiter(s) IV Continuous <Continuous>  dextrose 5%. 1000 milliLiter(s) IV Continuous <Continuous>  dextrose 50% Injectable 25 Gram(s) IV Push once  dextrose 50% Injectable 12.5 Gram(s) IV Push once  dextrose 50% Injectable 25 Gram(s) IV Push once  dextrose Oral Gel 15 Gram(s) Oral once PRN  fentaNYL   Patch  50 MICROgram(s)/Hr. 1 Patch Transdermal every 48 hours  glucagon  Injectable 1 milliGRAM(s) IntraMuscular once  heparin  Infusion 800 Unit(s)/Hr IV Continuous <Continuous>  HYDROmorphone  Injectable 0.5 milliGRAM(s) IV Push every 3 hours PRN  HYDROmorphone  Injectable 1 milliGRAM(s) IV Push every 3 hours PRN  influenza  Vaccine (HIGH DOSE) 0.7 milliLiter(s) IntraMuscular once  insulin lispro (ADMELOG) corrective regimen sliding scale   SubCutaneous every 6 hours  pantoprazole  Injectable 40 milliGRAM(s) IV Push two times a day  piperacillin/tazobactam IVPB.. 4.5 Gram(s) IV Intermittent every 8 hours  potassium chloride  10 mEq/100 mL IVPB 10 milliEquivalent(s) IV Intermittent every 1 hour  potassium phosphate IVPB 15 milliMole(s) IV Intermittent once      HOME MEDICATIONS:      PHYSICAL EXAM:      I/O Summary 24H    IN: 4351.4 mL / OUT: 1437 mL / NET: 2914.4 mL        T(F): 99 (12-18-22 @ 06:05), Max: 99.7 (12-17-22 @ 21:00)  HR: 99 (12-18-22 @ 06:00) (54 - 128)  BP: 139/70 (12-18-22 @ 06:00) (102/60 - 159/90)  BP(mean): 99 (12-18-22 @ 06:00) (77 - 119)  ABP: 133/67 (12-18-22 @ 06:00) (117/60 - 169/89)  ABP(mean): 87 (12-18-22 @ 06:00) (75 - 115)  RR: 19 (12-18-22 @ 06:00) (12 - 25)  SpO2: 96% (12-18-22 @ 06:00) (92% - 100%)    GEN: Awake, comfortable. NAD.   HEENT: NCAT, PERRL, EOMI. Mucosa moist. No JVD.   RESP: CTA b/l  CV: RRR, normal s1/s2. No m/r/g.  ABD: Soft, NTND. BS+  EXT: Warm. No edema, clubbing, or cyanosis.   NEURO: AAOx3. No focal deficits.      	  LABS:	 	    Cardiac Markers   12-18-22 @ 05:47: Trop T 0.04<HH> / CK 1809<H> / CKMB x      12-17-22 @ 20:36: Trop T 0.04<HH> / CK 1834<H> / CKMB 13.4<H>  12-17-22 @ 16:04: Trop T 0.03<H> / CK 1501<H> / CKMB 12.2<H>  12-17-22 @ 10:48: Trop T 0.03<H> / CK 1416<H> / CKMB 11.7<H>  12-17-22 @ 05:30: Trop T 0.04<HH> / CK 1412<H> / CKMB 10.7<H>  12-16-22 @ 12:34: Trop T x     / CK 1653<H> / CKMB x                CBC 12-18-22 @ 05:47                        8.8    12.93 )-----------( 328                   25.3       Hgb trend: 8.8 <-- , 8.3 <-- , 10.2 <-- , 10.8 <--   WBC trend: 12.93 <-- , 10.59 <-- , 11.88 <-- , 8.85 <--     CMP 12-18-22 @ 05:47    137  |  105  |  6<L>  ----------------------------<  108<H>  3.3<L>   |  24  |  0.67    Ca    8.0<L>      12-18-22 @ 05:47  Phos  2.1     12-18  Mg     1.8     12-18    TPro  5.0<L>  /  Alb  2.0<L>  /  TBili  1.5<H>  /  DBili  x   /  AST  61<H>  /  ALT  25  /  AlkPhos  92  12-18      Serum Cr trend: 0.67 <-- , 0.70 <-- , 0.62 <-- , 0.64 <--   proBNP:   Lipid Profile:   HgA1c:   TSH:     TELEMETRY: 	    ECG:  	Most recent EKG with Aflutter w/variable block, inc RBBB, nonspecific ST changes  RADIOLOGY:   ECHO:  STRESS:  CATH:

## 2022-12-18 NOTE — OCCUPATIONAL THERAPY INITIAL EVALUATION ADULT - RANGE OF MOTION EXAMINATION, UPPER EXTREMITY
BUE shoulder AROM limited to 0-90 degrees, with with decreased BUE elbow flexion/extension AROM 2/2 weakness,

## 2022-12-18 NOTE — PHYSICAL THERAPY INITIAL EVALUATION ADULT - DID THE PATIENT HAVE SURGERY?
Embolectomy, artery, superior mesenteric 11-Dec-2022; Exploratory laparotomy with small bowel resection 11-Dec-2022 ; Small bowel resection with anastomosis 15-Dec-2022/yes

## 2022-12-18 NOTE — OCCUPATIONAL THERAPY INITIAL EVALUATION ADULT - MD ORDER
12/11/22 Ex-lap, SMA embolectomy, small bowel resection.  12/13/22 2nd look operation for acute mesenteric ischemia  12/15/22 small bowel resection with anastomosis, closure of fascia of abdomen.

## 2022-12-18 NOTE — PROGRESS NOTE ADULT - ASSESSMENT
78 y/o M with Significant PMHx of IVDU found unresponsive at his nursing home, resolved after Narcan in the field, and brought to [facility]. Found to have PE, atrial flutter, and large LV thrombus on echo. Transferred to [facility] for further management. Pt C/o abdominal pain on 12/10 CTA showing mid SMA with embolus. Abnormal distal small bowel loops and cecum with dilatation and pneumatosis suggesting infarcted bowel. One or two tiny foci of  intrahepatic portal vein pneumatosis. Segmental infarction upper pole left kidney. Now s/p Exlap Embolectomy of SMA and resection of 80cm of SB pt left in discontinuity and transferred to SICU intubated. S/p second look on 12/14 and most recently s/p OR for 3rd look, end-to-end anastomosis of remaining bowel, loop ileostomy and abdomen closure.     Neuro: Fentanyl patch 50mcg q48h; IV Tylenol ATC, PRN Dilaudid   CV: Septic shock- resolved; AFlutter s/p Amio gtt and Digoxin; CHF- LVEF 15%, LV thrombus, LLE limb ischemia- Heparin gtt (goal PTT 60-90),  [place] QD.   Pulm: Extubated to RA on 12/17, IS  GI: NPO, D5LR @100/hr  : Uriarte- Infarction of upper pole of Left Kidney, Renal US negative on 12/12.   ID: Ischemic bowel: Zosyn (12/11-)    Heme: Hep C positive; Active T&S  Endo: mISS  PPx: SCD, Heparin gtt (PTT goal 60-90)   Lines: Rt TLC (12/11-12/17), L radial connie (12/11--), PIVs   Wounds: abdomen closed, RLQ loop ileostomy, LLE showing signs of demarcation   PT/OT: Ordered 12/17  Dispo: SICU  78 y/o M with Significant PMHx of IVDU found unresponsive at his nursing home, resolved after Narcan in the field, and brought to [facility]. Found to have PE, atrial flutter, and large LV thrombus on echo. Transferred to [facility] for further management. Pt C/o abdominal pain on 12/10 CTA showing mid SMA with embolus. Abnormal distal small bowel loops and cecum with dilatation and pneumatosis suggesting infarcted bowel. One or two tiny foci of  intrahepatic portal vein pneumatosis. Segmental infarction upper pole left kidney. Now s/p Exlap Embolectomy of SMA and resection of 80cm of SB pt left in discontinuity and transferred to SICU intubated. S/p second look on 12/14 and most recently s/p OR for 3rd look, end-to-end anastomosis of remaining bowel, loop ileostomy and abdomen closure.     Neuro: Fentanyl patch 50mcg q48h; Precedex gtt; IV Tylenol ATC, PRN Dilaudid  CV: Septic shock- resolved; AFlutter s/p Amio gtt and Digoxin; CHF- LVEF 15%, LV thrombus, LLE limb ischemia- Heparin gtt (goal PTT 60-90),  ME QD.   Pulm: Extubated to RA on 12/17, IS  GI: NPO, D5LR @100/hr  : Uriarte- Infarction of upper pole of Left Kidney, Renal US negative on 12/12.   ID: Ischemic bowel: Zosyn (12/11-)    Heme: Hep C positive; Active T&S  Endo: mISS  PPx: SCD, Heparin gtt (PTT goal 60-90)   Lines: Rt TLC (12/11-12/17), L radial connie (12/11--), PIVs   Wounds: abdomen closed, RLQ loop ileostomy, LLE showing signs of demarcation   PT/OT: Ordered 12/17  Dispo: SICU

## 2022-12-18 NOTE — OCCUPATIONAL THERAPY INITIAL EVALUATION ADULT - TRANSFER SAFETY CONCERNS NOTED: SIT/STAND, REHAB EVAL
Daily Note     Today's date: 3/12/2020  Patient name: Ponce Luevano  : 1948  MRN: [id number]  Referring provider: Donna Starkey MD  Dx:   No diagnosis found  Subjective:  0/10 pain in right UE        Objective: See treatment diary below  (+) tinel's over dorsal radial sensory nerve  Fabricated a custom volar wrist orthosis to avoid any pressure over nerve  She may wear the splint at night for elbow pain  If not having any elbow pain, she does not need to wear splint thus avoiding any potential pressure on dorsal radial sensory nerve  Assessment: Tolerated treatment well   P       Plan:  Discharge to [facility]     Precautions: universal       Manual  3/9  3/10          Massage to lateral epicondyle/extensors, radial wrist             IASTM to lateral elbow   8          Nerve glides-radial,median   10                                            Exercise Diary  1/27  2/3  2/12  2/17  2/24  3/3  3/6  3/10   1/23   Forearm flexor and extensor stretches      wrist bar  3x10        wrist bar flexion for stretching 2x10  10x       Eccentric wrist extensor strengthening         1# 3x10  RPB 2 x10  2x12         EDC RB  RB  RB    RB  RB       RB                           Rotator cuff strengthening                       Isometrics wrist extension HEP                 Instructed HEP, red power bar 2 x10 hold 15secs   Wrist flexion 1# 3x10                      ROM wrist forearm     Wrist maze      2# 3x10            Wrist PRE's                       Radial nerve glides               cane behind back and glide radial nerve                                                                                                                                                                                                                                                                 Modalities  3/3 3/10            1/23   E-stim                  MH to dorsal forearm 10' 10'            10-15min             
decreased balance during turns

## 2022-12-18 NOTE — PHYSICAL THERAPY INITIAL EVALUATION ADULT - ADDITIONAL COMMENTS
prior to nursing home, patient lived with son, independent prior to arrival, elevator, apartment, 4 steps to enter, +ramp

## 2022-12-18 NOTE — OCCUPATIONAL THERAPY INITIAL EVALUATION ADULT - NS ASR FOLLOW COMMAND OT EVAL
requiring repetitions for following one step commands/75% of the time/able to follow single-step instructions

## 2022-12-18 NOTE — PHYSICAL THERAPY INITIAL EVALUATION ADULT - GAIT DEVIATIONS NOTED, PT EVAL
sig forward trunk flexion, unsteady gait, dec ability to advance BLE/increased time in double stance/decreased weight-shifting ability

## 2022-12-18 NOTE — PROGRESS NOTE ADULT - SUBJECTIVE AND OBJECTIVE BOX
INTERVAL/OVERNIGHT EVENTS:  There were no acute events overnight.  Heparin was held temporarily overnight resulting in subtherapeutic PTT; however, resumed and will follow-up PTT to confirm adequate.    SUBJECTIVE:  Patient was agitated and was placed on Precedex after a dose of Versed.  Patient denies acute complaints and abdominal pain; however, endorses frustration with the restraints that were placed after pulling lines.        Neurologic Medications  acetaminophen   IVPB .. 1000 milliGRAM(s) IV Intermittent once  acetaminophen   IVPB .. 1000 milliGRAM(s) IV Intermittent once  acetaminophen   IVPB .. 1000 milliGRAM(s) IV Intermittent once  aspirin Suppository 150 milliGRAM(s) Rectal daily  dexMEDEtomidine Infusion 0.2 MICROgram(s)/kG/Hr IV Continuous <Continuous>  fentaNYL   Patch  50 MICROgram(s)/Hr. 1 Patch Transdermal every 48 hours  HYDROmorphone  Injectable 0.5 milliGRAM(s) IV Push every 3 hours PRN Moderate Pain (4 - 6)  HYDROmorphone  Injectable 1 milliGRAM(s) IV Push every 3 hours PRN Severe Pain (7 - 10)    Respiratory Medications  albuterol/ipratropium for Nebulization 3 milliLiter(s) Nebulizer every 6 hours    Cardiovascular Medications    Gastrointestinal Medications  dextrose 5% + lactated ringers. 1000 milliLiter(s) IV Continuous <Continuous>  dextrose 5%. 1000 milliLiter(s) IV Continuous <Continuous>  dextrose 5%. 1000 milliLiter(s) IV Continuous <Continuous>  pantoprazole  Injectable 40 milliGRAM(s) IV Push two times a day    Genitourinary Medications    Hematologic/Oncologic Medications  heparin  Infusion 800 Unit(s)/Hr IV Continuous <Continuous>  influenza  Vaccine (HIGH DOSE) 0.7 milliLiter(s) IntraMuscular once    Antimicrobial/Immunologic Medications  piperacillin/tazobactam IVPB.. 4.5 Gram(s) IV Intermittent every 8 hours    Endocrine/Metabolic Medications  dextrose 50% Injectable 25 Gram(s) IV Push once  dextrose 50% Injectable 12.5 Gram(s) IV Push once  dextrose 50% Injectable 25 Gram(s) IV Push once  dextrose Oral Gel 15 Gram(s) Oral once PRN Blood Glucose LESS THAN 70 milliGRAM(s)/deciliter  glucagon  Injectable 1 milliGRAM(s) IntraMuscular once  insulin lispro (ADMELOG) corrective regimen sliding scale   SubCutaneous every 6 hours    Topical/Other Medications  chlorhexidine 0.12% Liquid 15 milliLiter(s) Oral Mucosa every 12 hours  chlorhexidine 2% Cloths 1 Application(s) Topical <User Schedule>      MEDICATIONS  (PRN):  dextrose Oral Gel 15 Gram(s) Oral once PRN Blood Glucose LESS THAN 70 milliGRAM(s)/deciliter  HYDROmorphone  Injectable 0.5 milliGRAM(s) IV Push every 3 hours PRN Moderate Pain (4 - 6)  HYDROmorphone  Injectable 1 milliGRAM(s) IV Push every 3 hours PRN Severe Pain (7 - 10)      I&O's Detail    17 Dec 2022 07:01  -  18 Dec 2022 07:00  --------------------------------------------------------  IN:    Dexmedetomidine: 65.3 mL    Dexmedetomidine: 42.4 mL    dextrose 5% + lactated ringers: 2350 mL    FentaNYL: 6.4 mL    Heparin: 24 mL    Heparin: 168 mL    IV PiggyBack: 562.3 mL    IV PiggyBack: 200 mL    IV PiggyBack: 100 mL    IV PiggyBack: 300 mL    IV PiggyBack: 325 mL    Norepinephrine: 17.4 mL  Total IN: 4160.8 mL    OUT:    Amiodarone: 0 mL    Ileostomy (mL): 1050 mL    Indwelling Catheter - Urethral (mL): 1470 mL  Total OUT: 2520 mL    Total NET: 1640.8 mL      18 Dec 2022 07:01  -  18 Dec 2022 08:47  --------------------------------------------------------  IN:    Dexmedetomidine: 6.4 mL    dextrose 5% + lactated ringers: 100 mL    Heparin: 8 mL    IV PiggyBack: 25 mL    IV PiggyBack: 62.5 mL    IV PiggyBack: 100 mL  Total IN: 301.9 mL    OUT:    Ileostomy (mL): 190 mL    Indwelling Catheter - Urethral (mL): 100 mL  Total OUT: 290 mL    Total NET: 11.9 mL          Vital Signs Last 24 Hrs  T(C): 37.2 (18 Dec 2022 06:05), Max: 37.6 (17 Dec 2022 21:00)  T(F): 99 (18 Dec 2022 06:05), Max: 99.7 (17 Dec 2022 21:00)  HR: 95 (18 Dec 2022 08:00) (54 - 128)  BP: 106/67 (18 Dec 2022 08:00) (102/60 - 159/90)  BP(mean): 82 (18 Dec 2022 08:00) (77 - 119)  RR: 19 (18 Dec 2022 08:00) (12 - 25)  SpO2: 97% (18 Dec 2022 08:00) (92% - 100%)    Parameters below as of 18 Dec 2022 08:00  Patient On (Oxygen Delivery Method): room air      PHYSICAL EXAM:  General: Cachetic elderly male, appears stated age, responsive to questions although agitated  Neuro: Grossly intact bilaterally   HEENT: Normocephalic, atraumatic, trachea midline, no JVD   Chest: Equal rise and fall of chest   Heart: Regular S1/S2, no murmurs rubs or gallops   Lungs: Unlabored breathing on room air; Clear to auscultation bilaterally, no adventitious sounds   Abdomen: Soft, non-distended, normoactive bowel sounds throughout, no tenderness to palpation in all 4 quadrants; RLQ ostomy pink with gas in bag and sweat, midline laparotomy incision is clean/dry/intact    Upper Extremities: Trace edema in hands, freely mobile bilaterally   Lower Extremities: No edema, SCDs in place on RLE; RLE warm, LLE cool with blistering from upper calf to toes   Skin: Warm, non-diaphoretic throughout     LABS:                        8.8    12.93 )-----------( 328      ( 18 Dec 2022 05:47 )             25.3     12-18    137  |  105  |  6<L>  ----------------------------<  108<H>  3.3<L>   |  24  |  0.67    Ca    8.0<L>      18 Dec 2022 05:47  Phos  2.1     12-18  Mg     1.8     12-18    TPro  5.0<L>  /  Alb  2.0<L>  /  TBili  1.5<H>  /  DBili  x   /  AST  61<H>  /  ALT  25  /  AlkPhos  92  12-18    PT/INR - ( 18 Dec 2022 05:47 )   PT: 14.9 sec;   INR: 1.25          PTT - ( 18 Dec 2022 06:25 )  PTT:37.8 sec  Urinalysis Basic - ( 17 Dec 2022 11:23 )    Color: Yellow / Appearance: Clear / SG: >=1.030 / pH: x  Gluc: x / Ketone: NEGATIVE  / Bili: Negative / Urobili: 0.2 E.U./dL   Blood: x / Protein: Trace mg/dL / Nitrite: NEGATIVE   Leuk Esterase: NEGATIVE / RBC: Many /HPF / WBC 5-10 /HPF   Sq Epi: x / Non Sq Epi: 0-5 /HPF / Bacteria: Present /HPF

## 2022-12-18 NOTE — OCCUPATIONAL THERAPY INITIAL EVALUATION ADULT - STANDING BALANCE: STATIC
poor balance The patient has been re-examined and I agree with the above assessment or I updated with my findings.

## 2022-12-18 NOTE — OCCUPATIONAL THERAPY INITIAL EVALUATION ADULT - PERTINENT HX OF CURRENT PROBLEM, REHAB EVAL
Patient is a 78 y/o male with no significant past medical history BIBA from [facility] due to unresponsiveness. Patient was reportedly found unresponsive at his nursing home, Narcan was given in the field with resolution, and patient was taken to [facility] [place]. Pt was persistently tachycardic in 130-140s despite receiving Adenosine 6mg IV and then 12mg IV, Lopressor 2.5mg x2, PO Metoprolol tartrate 100mg. Utox was positive for opioids. CTPA suggestive of subsegmental PE in the right upper lobe. Echo showed severely reduced LV function with an LV thrombus. Heparin gtt was started. Cardiology and EP were consulted due to atrial flutter. EP suggested transfer to [facility] for LORENZO and possible ablation.

## 2022-12-18 NOTE — OCCUPATIONAL THERAPY INITIAL EVALUATION ADULT - PHYSICAL ASSIST/NONPHYSICAL ASSIST: SIT/STAND, REHAB EVAL
pt with kyphotic posture requiring max verbal cues and tactile cues for upright posture, pt unable to reach fully upright posture/verbal cues/nonverbal cues (demo/gestures)/2 person assist

## 2022-12-18 NOTE — OCCUPATIONAL THERAPY INITIAL EVALUATION ADULT - SHORT TERM MEMORY, REHAB EVAL
asking the same question multiple times throughout session without regard to therapist's answers. For example, pt asking "Why are we doing this?" Approx 5 different times throughout session, therapist provided explanation every time however pt appearing to not fully understanding/impaired

## 2022-12-18 NOTE — PROGRESS NOTE ADULT - ASSESSMENT
78yo M with no significant PMH/PSx admitted to cardiology service on 12/7 in the setting of severely reduced LV function 2/2 an LV thrombus. Pt now with several days of post-prandial abdominal pain and loose BMs, initially suspected to be 2/2 opioid withdrawal, however on evening of 12/10 had a large bloody BM. 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 Vascular surgery (already following) with plan for OR. General surgery consulted for possible operative assistance in the setting of bowel ischemia. Now POD2 s/p ex lap, SMA embolectomy and small bowel resection. Plan to RTOR today for second look.    Recommendations:  - NPO  - f/u cardiology recs  - Monitor UOP  - Monitor OLGA and vac output  - rest of care per SICU

## 2022-12-18 NOTE — OCCUPATIONAL THERAPY INITIAL EVALUATION ADULT - IMPAIRED TRANSFERS: SIT/STAND, REHAB EVAL
impaired balance/cognition/impaired coordination/impaired motor control/decreased ROM/decreased strength

## 2022-12-18 NOTE — PROGRESS NOTE ADULT - SUBJECTIVE AND OBJECTIVE BOX
INTERVAL HPI/OVERNIGHT EVENTS:    STATUS POST:      POST OPERATIVE DAY #:     SUBJECTIVE:  Flatus: [ ] YES [ ] NO             Bowel Movement: [ ] YES [ ] NO  Pain (0-10):            Pain Control Adequate: [ ] YES [ ] NO  Nausea: [ ] YES [ ] NO            Vomiting: [ ] YES [ ] NO  Diarrhea: [ ] YES [ ] NO         Constipation: [ ] YES [ ] NO     Chest Pain: [ ] YES [ ] NO    SOB:  [ ] YES [ ] NO    MEDICATIONS  (STANDING):  acetaminophen   IVPB .. 1000 milliGRAM(s) IV Intermittent once  acetaminophen   IVPB .. 1000 milliGRAM(s) IV Intermittent once  albuterol/ipratropium for Nebulization 3 milliLiter(s) Nebulizer every 6 hours  aspirin Suppository 150 milliGRAM(s) Rectal daily  chlorhexidine 0.12% Liquid 15 milliLiter(s) Oral Mucosa every 12 hours  chlorhexidine 2% Cloths 1 Application(s) Topical <User Schedule>  dexMEDEtomidine Infusion 0.2 MICROgram(s)/kG/Hr (3.22 mL/Hr) IV Continuous <Continuous>  dextrose 5% + lactated ringers. 1000 milliLiter(s) (100 mL/Hr) IV Continuous <Continuous>  dextrose 5%. 1000 milliLiter(s) (100 mL/Hr) IV Continuous <Continuous>  dextrose 5%. 1000 milliLiter(s) (50 mL/Hr) IV Continuous <Continuous>  dextrose 50% Injectable 25 Gram(s) IV Push once  dextrose 50% Injectable 12.5 Gram(s) IV Push once  dextrose 50% Injectable 25 Gram(s) IV Push once  fentaNYL   Patch  50 MICROgram(s)/Hr. 1 Patch Transdermal every 48 hours  glucagon  Injectable 1 milliGRAM(s) IntraMuscular once  heparin  Infusion 800 Unit(s)/Hr (8 mL/Hr) IV Continuous <Continuous>  influenza  Vaccine (HIGH DOSE) 0.7 milliLiter(s) IntraMuscular once  insulin lispro (ADMELOG) corrective regimen sliding scale   SubCutaneous every 6 hours  pantoprazole  Injectable 40 milliGRAM(s) IV Push two times a day  piperacillin/tazobactam IVPB.. 4.5 Gram(s) IV Intermittent every 8 hours    MEDICATIONS  (PRN):  dextrose Oral Gel 15 Gram(s) Oral once PRN Blood Glucose LESS THAN 70 milliGRAM(s)/deciliter  HYDROmorphone  Injectable 0.5 milliGRAM(s) IV Push every 3 hours PRN Moderate Pain (4 - 6)  HYDROmorphone  Injectable 1 milliGRAM(s) IV Push every 3 hours PRN Severe Pain (7 - 10)      Vital Signs Last 24 Hrs  T(C): 36.8 (18 Dec 2022 09:03), Max: 37.6 (17 Dec 2022 21:00)  T(F): 98.2 (18 Dec 2022 09:03), Max: 99.7 (17 Dec 2022 21:00)  HR: 102 (18 Dec 2022 11:00) (57 - 128)  BP: 132/72 (18 Dec 2022 11:00) (102/60 - 159/90)  BP(mean): 93 (18 Dec 2022 11:00) (77 - 119)  RR: 17 (18 Dec 2022 11:00) (12 - 25)  SpO2: 95% (18 Dec 2022 11:00) (92% - 100%)    Parameters below as of 18 Dec 2022 11:00  Patient On (Oxygen Delivery Method): room air        PHYSICAL EXAM:      Constitutional: A&Ox3    Breasts:    Respiratory: non labored breathing, no respiratory distress    Cardiovascular: NSR, RRR    Gastrointestinal:                 Incision:    Genitourinary:    Extremities: (-) edema                  I&O's Detail    17 Dec 2022 07:01  -  18 Dec 2022 07:00  --------------------------------------------------------  IN:    Dexmedetomidine: 42.4 mL    Dexmedetomidine: 65.3 mL    dextrose 5% + lactated ringers: 2350 mL    FentaNYL: 6.4 mL    Heparin: 24 mL    Heparin: 168 mL    IV PiggyBack: 100 mL    IV PiggyBack: 200 mL    IV PiggyBack: 562.3 mL    IV PiggyBack: 300 mL    IV PiggyBack: 325 mL    Norepinephrine: 17.4 mL  Total IN: 4160.8 mL    OUT:    Amiodarone: 0 mL    Ileostomy (mL): 1050 mL    Indwelling Catheter - Urethral (mL): 1470 mL  Total OUT: 2520 mL    Total NET: 1640.8 mL      18 Dec 2022 07:01  -  18 Dec 2022 11:24  --------------------------------------------------------  IN:    Dexmedetomidine: 25.6 mL    dextrose 5% + lactated ringers: 400 mL    Heparin: 32 mL    IV PiggyBack: 25 mL    IV PiggyBack: 100 mL    IV PiggyBack: 187.5 mL  Total IN: 770.1 mL    OUT:    Ileostomy (mL): 190 mL    Indwelling Catheter - Urethral (mL): 235 mL  Total OUT: 425 mL    Total NET: 345.1 mL          LABS:                        8.8    12.93 )-----------( 328      ( 18 Dec 2022 05:47 )             25.3     12-18    137  |  105  |  6<L>  ----------------------------<  108<H>  3.3<L>   |  24  |  0.67    Ca    8.0<L>      18 Dec 2022 05:47  Phos  2.1     12-18  Mg     1.8     12-18    TPro  5.0<L>  /  Alb  2.0<L>  /  TBili  1.5<H>  /  DBili  x   /  AST  61<H>  /  ALT  25  /  AlkPhos  92  12-18    PT/INR - ( 18 Dec 2022 05:47 )   PT: 14.9 sec;   INR: 1.25          PTT - ( 18 Dec 2022 06:25 )  PTT:37.8 sec  Urinalysis Basic - ( 17 Dec 2022 11:23 )    Color: Yellow / Appearance: Clear / SG: >=1.030 / pH: x  Gluc: x / Ketone: NEGATIVE  / Bili: Negative / Urobili: 0.2 E.U./dL   Blood: x / Protein: Trace mg/dL / Nitrite: NEGATIVE   Leuk Esterase: NEGATIVE / RBC: Many /HPF / WBC 5-10 /HPF   Sq Epi: x / Non Sq Epi: 0-5 /HPF / Bacteria: Present /HPF        RADIOLOGY & ADDITIONAL STUDIES: INTERVAL HPI/OVERNIGHT EVENTS:    STATUS POST:      POST OPERATIVE DAY #:     SUBJECTIVE:    MEDICATIONS  (STANDING):  acetaminophen   IVPB .. 1000 milliGRAM(s) IV Intermittent once  acetaminophen   IVPB .. 1000 milliGRAM(s) IV Intermittent once  albuterol/ipratropium for Nebulization 3 milliLiter(s) Nebulizer every 6 hours  aspirin Suppository 150 milliGRAM(s) Rectal daily  chlorhexidine 0.12% Liquid 15 milliLiter(s) Oral Mucosa every 12 hours  chlorhexidine 2% Cloths 1 Application(s) Topical <User Schedule>  dexMEDEtomidine Infusion 0.2 MICROgram(s)/kG/Hr (3.22 mL/Hr) IV Continuous <Continuous>  dextrose 5% + lactated ringers. 1000 milliLiter(s) (100 mL/Hr) IV Continuous <Continuous>  dextrose 5%. 1000 milliLiter(s) (100 mL/Hr) IV Continuous <Continuous>  dextrose 5%. 1000 milliLiter(s) (50 mL/Hr) IV Continuous <Continuous>  dextrose 50% Injectable 25 Gram(s) IV Push once  dextrose 50% Injectable 12.5 Gram(s) IV Push once  dextrose 50% Injectable 25 Gram(s) IV Push once  fentaNYL   Patch  50 MICROgram(s)/Hr. 1 Patch Transdermal every 48 hours  glucagon  Injectable 1 milliGRAM(s) IntraMuscular once  heparin  Infusion 800 Unit(s)/Hr (8 mL/Hr) IV Continuous <Continuous>  influenza  Vaccine (HIGH DOSE) 0.7 milliLiter(s) IntraMuscular once  insulin lispro (ADMELOG) corrective regimen sliding scale   SubCutaneous every 6 hours  pantoprazole  Injectable 40 milliGRAM(s) IV Push two times a day  piperacillin/tazobactam IVPB.. 4.5 Gram(s) IV Intermittent every 8 hours    MEDICATIONS  (PRN):  dextrose Oral Gel 15 Gram(s) Oral once PRN Blood Glucose LESS THAN 70 milliGRAM(s)/deciliter  HYDROmorphone  Injectable 0.5 milliGRAM(s) IV Push every 3 hours PRN Moderate Pain (4 - 6)  HYDROmorphone  Injectable 1 milliGRAM(s) IV Push every 3 hours PRN Severe Pain (7 - 10)      Vital Signs Last 24 Hrs  T(C): 36.8 (18 Dec 2022 09:03), Max: 37.6 (17 Dec 2022 21:00)  T(F): 98.2 (18 Dec 2022 09:03), Max: 99.7 (17 Dec 2022 21:00)  HR: 102 (18 Dec 2022 11:00) (57 - 128)  BP: 132/72 (18 Dec 2022 11:00) (102/60 - 159/90)  BP(mean): 93 (18 Dec 2022 11:00) (77 - 119)  RR: 17 (18 Dec 2022 11:00) (12 - 25)  SpO2: 95% (18 Dec 2022 11:00) (92% - 100%)    Parameters below as of 18 Dec 2022 11:00  Patient On (Oxygen Delivery Method): room air        PHYSICAL EXAM:              I&O's Detail    17 Dec 2022 07:01  -  18 Dec 2022 07:00  --------------------------------------------------------  IN:    Dexmedetomidine: 42.4 mL    Dexmedetomidine: 65.3 mL    dextrose 5% + lactated ringers: 2350 mL    FentaNYL: 6.4 mL    Heparin: 24 mL    Heparin: 168 mL    IV PiggyBack: 100 mL    IV PiggyBack: 200 mL    IV PiggyBack: 562.3 mL    IV PiggyBack: 300 mL    IV PiggyBack: 325 mL    Norepinephrine: 17.4 mL  Total IN: 4160.8 mL    OUT:    Amiodarone: 0 mL    Ileostomy (mL): 1050 mL    Indwelling Catheter - Urethral (mL): 1470 mL  Total OUT: 2520 mL    Total NET: 1640.8 mL      18 Dec 2022 07:01  -  18 Dec 2022 11:24  --------------------------------------------------------  IN:    Dexmedetomidine: 25.6 mL    dextrose 5% + lactated ringers: 400 mL    Heparin: 32 mL    IV PiggyBack: 25 mL    IV PiggyBack: 100 mL    IV PiggyBack: 187.5 mL  Total IN: 770.1 mL    OUT:    Ileostomy (mL): 190 mL    Indwelling Catheter - Urethral (mL): 235 mL  Total OUT: 425 mL    Total NET: 345.1 mL          LABS:                        8.8    12.93 )-----------( 328      ( 18 Dec 2022 05:47 )             25.3     12-18    137  |  105  |  6<L>  ----------------------------<  108<H>  3.3<L>   |  24  |  0.67    Ca    8.0<L>      18 Dec 2022 05:47  Phos  2.1     12-18  Mg     1.8     12-18    TPro  5.0<L>  /  Alb  2.0<L>  /  TBili  1.5<H>  /  DBili  x   /  AST  61<H>  /  ALT  25  /  AlkPhos  92  12-18    PT/INR - ( 18 Dec 2022 05:47 )   PT: 14.9 sec;   INR: 1.25          PTT - ( 18 Dec 2022 06:25 )  PTT:37.8 sec  Urinalysis Basic - ( 17 Dec 2022 11:23 )    Color: Yellow / Appearance: Clear / SG: >=1.030 / pH: x  Gluc: x / Ketone: NEGATIVE  / Bili: Negative / Urobili: 0.2 E.U./dL   Blood: x / Protein: Trace mg/dL / Nitrite: NEGATIVE   Leuk Esterase: NEGATIVE / RBC: Many /HPF / WBC 5-10 /HPF   Sq Epi: x / Non Sq Epi: 0-5 /HPF / Bacteria: Present /HPF        RADIOLOGY & ADDITIONAL STUDIES: INTERVAL HPI/OVERNIGHT EVENTS:  Patient seen and examined by chief resident and team on AM rounds. No acute complaints.     SUBJECTIVE:    MEDICATIONS  (STANDING):  acetaminophen   IVPB .. 1000 milliGRAM(s) IV Intermittent once  acetaminophen   IVPB .. 1000 milliGRAM(s) IV Intermittent once  albuterol/ipratropium for Nebulization 3 milliLiter(s) Nebulizer every 6 hours  aspirin Suppository 150 milliGRAM(s) Rectal daily  chlorhexidine 0.12% Liquid 15 milliLiter(s) Oral Mucosa every 12 hours  chlorhexidine 2% Cloths 1 Application(s) Topical <User Schedule>  dexMEDEtomidine Infusion 0.2 MICROgram(s)/kG/Hr (3.22 mL/Hr) IV Continuous <Continuous>  dextrose 5% + lactated ringers. 1000 milliLiter(s) (100 mL/Hr) IV Continuous <Continuous>  dextrose 5%. 1000 milliLiter(s) (100 mL/Hr) IV Continuous <Continuous>  dextrose 5%. 1000 milliLiter(s) (50 mL/Hr) IV Continuous <Continuous>  dextrose 50% Injectable 25 Gram(s) IV Push once  dextrose 50% Injectable 12.5 Gram(s) IV Push once  dextrose 50% Injectable 25 Gram(s) IV Push once  fentaNYL   Patch  50 MICROgram(s)/Hr. 1 Patch Transdermal every 48 hours  glucagon  Injectable 1 milliGRAM(s) IntraMuscular once  heparin  Infusion 800 Unit(s)/Hr (8 mL/Hr) IV Continuous <Continuous>  influenza  Vaccine (HIGH DOSE) 0.7 milliLiter(s) IntraMuscular once  insulin lispro (ADMELOG) corrective regimen sliding scale   SubCutaneous every 6 hours  pantoprazole  Injectable 40 milliGRAM(s) IV Push two times a day  piperacillin/tazobactam IVPB.. 4.5 Gram(s) IV Intermittent every 8 hours    MEDICATIONS  (PRN):  dextrose Oral Gel 15 Gram(s) Oral once PRN Blood Glucose LESS THAN 70 milliGRAM(s)/deciliter  HYDROmorphone  Injectable 0.5 milliGRAM(s) IV Push every 3 hours PRN Moderate Pain (4 - 6)  HYDROmorphone  Injectable 1 milliGRAM(s) IV Push every 3 hours PRN Severe Pain (7 - 10)      Vital Signs Last 24 Hrs  T(C): 36.8 (18 Dec 2022 09:03), Max: 37.6 (17 Dec 2022 21:00)  T(F): 98.2 (18 Dec 2022 09:03), Max: 99.7 (17 Dec 2022 21:00)  HR: 102 (18 Dec 2022 11:00) (57 - 128)  BP: 132/72 (18 Dec 2022 11:00) (102/60 - 159/90)  BP(mean): 93 (18 Dec 2022 11:00) (77 - 119)  RR: 17 (18 Dec 2022 11:00) (12 - 25)  SpO2: 95% (18 Dec 2022 11:00) (92% - 100%)    Parameters below as of 18 Dec 2022 11:00  Patient On (Oxygen Delivery Method): room air    PHYSICAL EXAM:  Gen: NAD  CV: RRR  Resp: nonlabored breathing   Abd: Soft, NTND abdomen. RLQ ostomy pink with gas in bag and sweat, midline laparotomy incision c/d/i.     Skin: warm    I&O's Detail    17 Dec 2022 07:01  -  18 Dec 2022 07:00  --------------------------------------------------------  IN:    Dexmedetomidine: 42.4 mL    Dexmedetomidine: 65.3 mL    dextrose 5% + lactated ringers: 2350 mL    FentaNYL: 6.4 mL    Heparin: 24 mL    Heparin: 168 mL    IV PiggyBack: 100 mL    IV PiggyBack: 200 mL    IV PiggyBack: 562.3 mL    IV PiggyBack: 300 mL    IV PiggyBack: 325 mL    Norepinephrine: 17.4 mL  Total IN: 4160.8 mL    OUT:    Amiodarone: 0 mL    Ileostomy (mL): 1050 mL    Indwelling Catheter - Urethral (mL): 1470 mL  Total OUT: 2520 mL    Total NET: 1640.8 mL      18 Dec 2022 07:01  -  18 Dec 2022 11:24  --------------------------------------------------------  IN:    Dexmedetomidine: 25.6 mL    dextrose 5% + lactated ringers: 400 mL    Heparin: 32 mL    IV PiggyBack: 25 mL    IV PiggyBack: 100 mL    IV PiggyBack: 187.5 mL  Total IN: 770.1 mL    OUT:    Ileostomy (mL): 190 mL    Indwelling Catheter - Urethral (mL): 235 mL  Total OUT: 425 mL    Total NET: 345.1 mL      LABS:                        8.8    12.93 )-----------( 328      ( 18 Dec 2022 05:47 )             25.3     12-18    137  |  105  |  6<L>  ----------------------------<  108<H>  3.3<L>   |  24  |  0.67    Ca    8.0<L>      18 Dec 2022 05:47  Phos  2.1     12-18  Mg     1.8     12-18    TPro  5.0<L>  /  Alb  2.0<L>  /  TBili  1.5<H>  /  DBili  x   /  AST  61<H>  /  ALT  25  /  AlkPhos  92  12-18    PT/INR - ( 18 Dec 2022 05:47 )   PT: 14.9 sec;   INR: 1.25          PTT - ( 18 Dec 2022 06:25 )  PTT:37.8 sec  Urinalysis Basic - ( 17 Dec 2022 11:23 )    Color: Yellow / Appearance: Clear / SG: >=1.030 / pH: x  Gluc: x / Ketone: NEGATIVE  / Bili: Negative / Urobili: 0.2 E.U./dL   Blood: x / Protein: Trace mg/dL / Nitrite: NEGATIVE   Leuk Esterase: NEGATIVE / RBC: Many /HPF / WBC 5-10 /HPF   Sq Epi: x / Non Sq Epi: 0-5 /HPF / Bacteria: Present /HPF        RADIOLOGY & ADDITIONAL STUDIES:

## 2022-12-18 NOTE — OCCUPATIONAL THERAPY INITIAL EVALUATION ADULT - ADDITIONAL COMMENTS
Pt is a questionable historian as pt is A&Ox4 however with decreased short term memory noted throughout. Pt states that he lives with his son in an elevator access apartment with 4STE, +ramp. Pt reports that prior to admission, pt was independent in all ADLs and IADLs, and ambulates with no device. However, per chart pt presents from nursing home, unknown PLOF.

## 2022-12-18 NOTE — PHYSICAL THERAPY INITIAL EVALUATION ADULT - GENERAL OBSERVATIONS, REHAB EVAL
Received supine denies pain +EKG, IV, connie, BUE restraints, R SCD, bowen. Left as found +lines intact, RN donna aware, BUE restraints not on , PCA Shahzad present, call barnes

## 2022-12-18 NOTE — OCCUPATIONAL THERAPY INITIAL EVALUATION ADULT - PLANNED THERAPY INTERVENTIONS, OT EVAL
ADL retraining/IADL retraining/balance training/bed mobility training/cognitive, visual perceptual/fine motor coordination training/motor coordination training/ROM/strengthening/transfer training

## 2022-12-18 NOTE — OCCUPATIONAL THERAPY INITIAL EVALUATION ADULT - MANUAL MUSCLE TESTING RESULTS, REHAB EVAL
BUE shoulder flexion/extension 3-/5, RUE flexion/extension 3+/5, LUE elbow flexion 3-/5, LUE elbow extension 3/5. R  strength 5/5, L  strength 4+/5. BLE hip flexion 2/5, BLE knee extension 3-/5, BLE DF/PF 3-/5.

## 2022-12-19 NOTE — PROGRESS NOTE ADULT - ASSESSMENT
77 year old male with no documented past medical history presenting from [facility] after being found unresponsive at his nursing home s/p narcan with resolution. Found to have atrial flutter. Echo showed severely reduced LV function with an LV thrombus. Heparin gtt was started and pt transferred to [facility] for LORENZO and possible ablation. Vascular consulted for cold leg. Bilateral Duplex (12/8) showed acute thrombus completely occluding the left popliteal artery and acute thrombus incompletely occluding the left dorsalis pedis artery and likely acute thrombus completely occluding the left mid superficial femoral artery and extending to the left popliteal artery. Left DP with absent signals. Patient noted to have acute abdominal pain overnight with bloody diarrhea c/f acute mesenteric ischemia and taken emergently to the OR. Now POD2 s/p ex lap, SMA embolectomy, and SBR (80 cm), s/p second look with additional small bowel resection (50cm) with wound left open (abthera in place) with plans for second look today. No need mesenteric angiogram today as patient is off pressors, extubated and hemodynamically stable.     Plan:   - Recommend continued hep gtt to goal of PTT    - no indication for acute vascular intervention at this time - awaiting for leg to demarcate  - Rest of care per SICU  - Vascular surgery Team 3C will continue to follow. Please call [phone] with any questions or concerns.      76 y/o M with Significant PMHx of IVDU found unresponsive at his nursing home, resolved after Narcan in the field, and brought to [facility]. Found to have PE, atrial flutter, and large LV thrombus on echo. Transferred to [facility] for further management. Pt C/o abdominal pain on 12/10 CTA showing mid SMA with embolus. Abnormal distal small bowel loops and cecum with dilatation and pneumatosis suggesting infarcted bowel. One or two tiny foci of  intrahepatic portal vein pneumatosis. Segmental infarction upper pole left kidney. Now s/p Ex lap, SMA embolectomy, 80cm SBR, abthera vac left in discontinuity (12/11) and transferred to SICU intubated. S/p second look (12/13) and most recently s/p OR for 3rd look, end-to-end anastomosis of remaining bowel, loop ileostomy and abdomen closure (12/15). Remains in SICU now extubated with acute limb ischemia to LLE pending amputation. Patient is off pressors, extubated and hemodynamically stable.     Plan:   - Recommend continued hep gtt to goal of PTT    - no indication for acute vascular intervention at this time - awaiting for leg to demarcate  - Rest of care per SICU  - Vascular surgery Team 3C will continue to follow. Please call [phone] with any questions or concerns.

## 2022-12-19 NOTE — PROGRESS NOTE ADULT - SUBJECTIVE AND OBJECTIVE BOX
ON: PTT at 2300 66.7. No chg to hep gtt.   12/18: Trop 0.04, lytes repleted; PTT 29.9, repeat 37.8- but hep gtt was OFF --> continued @800u/hr; 11AM PTT 38.8- inc hep to 10/hr, CKMB down to 9.1 (13); worked with PT, able to dc restraints; 5pm PTT 51.8- inc hep to 12/hr      SUBJECTIVE: Patient seen and examined bedside; no events overnight, HD stable, sleepy during encounter, no complaints.    aspirin enteric coated 81 milliGRAM(s) Oral daily  heparin  Infusion 1200 Unit(s)/Hr IV Continuous <Continuous>  metoprolol succinate ER 25 milliGRAM(s) Oral daily  piperacillin/tazobactam IVPB.- 4.5 Gram(s) IV Intermittent once  piperacillin/tazobactam IVPB.. 4.5 Gram(s) IV Intermittent every 8 hours  vancomycin  IVPB 1000 milliGRAM(s) IV Intermittent every 12 hours      Vital Signs Last 24 Hrs  T(C): 36.3 (19 Dec 2022 09:00), Max: 37.3 (18 Dec 2022 22:04)  T(F): 97.4 (19 Dec 2022 09:00), Max: 99.2 (18 Dec 2022 22:04)  HR: 114 (19 Dec 2022 12:00) (83 - 114)  BP: 123/87 (19 Dec 2022 12:00) (98/70 - 135/67)  BP(mean): 102 (19 Dec 2022 12:00) (76 - 106)  RR: 14 (19 Dec 2022 12:00) (11 - 20)  SpO2: 97% (19 Dec 2022 12:00) (95% - 100%)    Parameters below as of 19 Dec 2022 12:00  Patient On (Oxygen Delivery Method): room air      I&O's Detail    18 Dec 2022 07:01  -  19 Dec 2022 07:00  --------------------------------------------------------  IN:    Dexmedetomidine: 153.6 mL    dextrose 5% + lactated ringers: 2400 mL    Heparin: 156 mL    Heparin: 50 mL    Heparin Infusion: 50 mL    IV PiggyBack: 200 mL    IV PiggyBack: 125 mL    IV PiggyBack: 100 mL    IV PiggyBack: 250 mL  Total IN: 3484.6 mL    OUT:    Ileostomy (mL): 2540 mL    Indwelling Catheter - Urethral (mL): 1165 mL  Total OUT: 3705 mL    Total NET: -220.4 mL      19 Dec 2022 07:01  -  19 Dec 2022 13:08  --------------------------------------------------------  IN:    Dexmedetomidine: 6.4 mL    dextrose 5% + lactated ringers: 600 mL    Heparin: 72 mL  Total IN: 678.4 mL    OUT:    Ileostomy (mL): 745 mL    Indwelling Catheter - Urethral (mL): 270 mL  Total OUT: 1015 mL    Total NET: -336.6 mL      PE:    General: NAD, resting comfortably in bed  C/V: S1 s2, RRR  Pulm: Nonlabored breathing, no respiratory distress  Abd: Soft, NTND, midline incision c/d/i, staples in place  Extrem: [facility]        LABS:                        9.3    13.23 )-----------( 373      ( 19 Dec 2022 05:30 )             27.8     12-19    135  |  104  |  7   ----------------------------<  97  3.7   |  24  |  0.67    Ca    8.1<L>      19 Dec 2022 05:30  Phos  2.1     12-18  Mg     1.8     12-18    TPro  5.0<L>  /  Alb  2.0<L>  /  TBili  1.5<H>  /  DBili  x   /  AST  61<H>  /  ALT  25  /  AlkPhos  92  12-18    PT/INR - ( 19 Dec 2022 05:30 )   PT: 13.7 sec;   INR: 1.15          PTT - ( 19 Dec 2022 11:31 )  PTT:62.8 sec      RADIOLOGY & ADDITIONAL STUDIES:

## 2022-12-19 NOTE — PROGRESS NOTE ADULT - SUBJECTIVE AND OBJECTIVE BOX
INTERVAL EVENTS:    PAST MEDICAL & SURGICAL HISTORY:      MEDICATIONS  (STANDING):  aspirin Suppository 150 milliGRAM(s) Rectal daily  chlorhexidine 0.12% Liquid 15 milliLiter(s) Oral Mucosa every 12 hours  chlorhexidine 2% Cloths 1 Application(s) Topical <User Schedule>  dextrose 5% + lactated ringers. 1000 milliLiter(s) (100 mL/Hr) IV Continuous <Continuous>  dextrose 5%. 1000 milliLiter(s) (50 mL/Hr) IV Continuous <Continuous>  dextrose 5%. 1000 milliLiter(s) (100 mL/Hr) IV Continuous <Continuous>  dextrose 50% Injectable 25 Gram(s) IV Push once  dextrose 50% Injectable 12.5 Gram(s) IV Push once  dextrose 50% Injectable 25 Gram(s) IV Push once  fentaNYL   Patch  50 MICROgram(s)/Hr. 1 Patch Transdermal every 48 hours  glucagon  Injectable 1 milliGRAM(s) IntraMuscular once  heparin  Infusion 1200 Unit(s)/Hr (12 mL/Hr) IV Continuous <Continuous>  influenza  Vaccine (HIGH DOSE) 0.7 milliLiter(s) IntraMuscular once  insulin lispro (ADMELOG) corrective regimen sliding scale   SubCutaneous every 6 hours  pantoprazole  Injectable 40 milliGRAM(s) IV Push two times a day  piperacillin/tazobactam IVPB.- 4.5 Gram(s) IV Intermittent once  piperacillin/tazobactam IVPB.. 4.5 Gram(s) IV Intermittent every 8 hours  potassium chloride  20 mEq/100 mL IVPB 20 milliEquivalent(s) IV Intermittent every 2 hours  vancomycin  IVPB 1000 milliGRAM(s) IV Intermittent every 12 hours    MEDICATIONS  (PRN):  dextrose Oral Gel 15 Gram(s) Oral once PRN Blood Glucose LESS THAN 70 milliGRAM(s)/deciliter  HYDROmorphone  Injectable 0.5 milliGRAM(s) IV Push every 3 hours PRN Moderate Pain (4 - 6)  HYDROmorphone  Injectable 1 milliGRAM(s) IV Push every 3 hours PRN Severe Pain (7 - 10)      Vital Signs Last 24 Hrs  T(C): 36.3 (19 Dec 2022 09:00), Max: 37.3 (18 Dec 2022 13:07)  T(F): 97.4 (19 Dec 2022 09:00), Max: 99.2 (18 Dec 2022 22:04)  HR: 102 (19 Dec 2022 10:00) (83 - 120)  BP: 112/78 (19 Dec 2022 10:00) (98/70 - 135/67)  BP(mean): 90 (19 Dec 2022 10:00) (76 - 106)  RR: 16 (19 Dec 2022 10:00) (11 - 20)  SpO2: 95% (19 Dec 2022 10:00) (95% - 100%)    Parameters below as of 19 Dec 2022 09:00  Patient On (Oxygen Delivery Method): room air         PHYSICAL EXAM:  GEN: Awake, alert. NAD.   HEENT: NCAT, PERRL, EOMI. Mucosa moist. No JVD.  RESP: CTA b/l  CV: RRR. Normal S1/S2. No m/r/g.  ABD: Soft. NT/ND. BS+  EXT: Warm. No edema, clubbing, or cyanosis.   NEURO: AAOx3. No focal deficits.     LABS:                        9.3    13.23 )-----------( 373      ( 19 Dec 2022 05:30 )             27.8     12-19    135  |  104  |  7   ----------------------------<  97  3.7   |  24  |  0.67    Ca    8.1<L>      19 Dec 2022 05:30  Phos  2.1     12-18  Mg     1.8     12-18    TPro  5.0<L>  /  Alb  2.0<L>  /  TBili  1.5<H>  /  DBili  x   /  AST  61<H>  /  ALT  25  /  AlkPhos  92  12-18    CARDIAC MARKERS ( 18 Dec 2022 10:23 )  x     / x     / 1525 U/L / x     / 9.1 ng/mL  CARDIAC MARKERS ( 18 Dec 2022 05:47 )  x     / 0.04 ng/mL / 1809 U/L / x     / x      CARDIAC MARKERS ( 17 Dec 2022 20:36 )  x     / 0.04 ng/mL / 1834 U/L / x     / 13.4 ng/mL  CARDIAC MARKERS ( 17 Dec 2022 16:04 )  x     / 0.03 ng/mL / 1501 U/L / x     / 12.2 ng/mL      PT/INR - ( 19 Dec 2022 05:30 )   PT: 13.7 sec;   INR: 1.15          PTT - ( 19 Dec 2022 05:30 )  PTT:63.6 sec    I&O's Summary    18 Dec 2022 07:01  -  19 Dec 2022 07:00  --------------------------------------------------------  IN: 3484.6 mL / OUT: 3705 mL / NET: -220.4 mL    19 Dec 2022 07:01  -  19 Dec 2022 11:51  --------------------------------------------------------  IN: 454.4 mL / OUT: 795 mL / NET: -340.6 mL      BNP  RADIOLOGY & ADDITIONAL STUDIES:    TELEMETRY:    EKG:         INTERVAL EVENTS:  MAYO.     PAST MEDICAL & SURGICAL HISTORY:      MEDICATIONS  (STANDING):  aspirin Suppository 150 milliGRAM(s) Rectal daily  chlorhexidine 0.12% Liquid 15 milliLiter(s) Oral Mucosa every 12 hours  chlorhexidine 2% Cloths 1 Application(s) Topical <User Schedule>  dextrose 5% + lactated ringers. 1000 milliLiter(s) (100 mL/Hr) IV Continuous <Continuous>  dextrose 5%. 1000 milliLiter(s) (50 mL/Hr) IV Continuous <Continuous>  dextrose 5%. 1000 milliLiter(s) (100 mL/Hr) IV Continuous <Continuous>  dextrose 50% Injectable 25 Gram(s) IV Push once  dextrose 50% Injectable 12.5 Gram(s) IV Push once  dextrose 50% Injectable 25 Gram(s) IV Push once  fentaNYL   Patch  50 MICROgram(s)/Hr. 1 Patch Transdermal every 48 hours  glucagon  Injectable 1 milliGRAM(s) IntraMuscular once  heparin  Infusion 1200 Unit(s)/Hr (12 mL/Hr) IV Continuous <Continuous>  influenza  Vaccine (HIGH DOSE) 0.7 milliLiter(s) IntraMuscular once  insulin lispro (ADMELOG) corrective regimen sliding scale   SubCutaneous every 6 hours  pantoprazole  Injectable 40 milliGRAM(s) IV Push two times a day  piperacillin/tazobactam IVPB.- 4.5 Gram(s) IV Intermittent once  piperacillin/tazobactam IVPB.. 4.5 Gram(s) IV Intermittent every 8 hours  potassium chloride  20 mEq/100 mL IVPB 20 milliEquivalent(s) IV Intermittent every 2 hours  vancomycin  IVPB 1000 milliGRAM(s) IV Intermittent every 12 hours    MEDICATIONS  (PRN):  dextrose Oral Gel 15 Gram(s) Oral once PRN Blood Glucose LESS THAN 70 milliGRAM(s)/deciliter  HYDROmorphone  Injectable 0.5 milliGRAM(s) IV Push every 3 hours PRN Moderate Pain (4 - 6)  HYDROmorphone  Injectable 1 milliGRAM(s) IV Push every 3 hours PRN Severe Pain (7 - 10)      Vital Signs Last 24 Hrs  T(C): 36.3 (19 Dec 2022 09:00), Max: 37.3 (18 Dec 2022 13:07)  T(F): 97.4 (19 Dec 2022 09:00), Max: 99.2 (18 Dec 2022 22:04)  HR: 102 (19 Dec 2022 10:00) (83 - 120)  BP: 112/78 (19 Dec 2022 10:00) (98/70 - 135/67)  BP(mean): 90 (19 Dec 2022 10:00) (76 - 106)  RR: 16 (19 Dec 2022 10:00) (11 - 20)  SpO2: 95% (19 Dec 2022 10:00) (95% - 100%)    Parameters below as of 19 Dec 2022 09:00  Patient On (Oxygen Delivery Method): room air      PHYSICAL EXAM:  GEN: Awake, alert. NAD.   HEENT: NCAT, EOMI. Mucosa moist. No JVD.  RESP: CTA b/l  CV: RRR. Normal S1/S2. No m/r/g.  ABD: Soft. abdominal wall staples. Ostomy.   EXT: Warm. No edema, clubbing, or cyanosis.   NEURO: AAOx3. No focal deficits.     LABS:                        9.3    13.23 )-----------( 373      ( 19 Dec 2022 05:30 )             27.8     12-19    135  |  104  |  7   ----------------------------<  97  3.7   |  24  |  0.67    Ca    8.1<L>      19 Dec 2022 05:30  Phos  2.1     12-18  Mg     1.8     12-18    TPro  5.0<L>  /  Alb  2.0<L>  /  TBili  1.5<H>  /  DBili  x   /  AST  61<H>  /  ALT  25  /  AlkPhos  92  12-18    CARDIAC MARKERS ( 18 Dec 2022 10:23 )  x     / x     / 1525 U/L / x     / 9.1 ng/mL  CARDIAC MARKERS ( 18 Dec 2022 05:47 )  x     / 0.04 ng/mL / 1809 U/L / x     / x      CARDIAC MARKERS ( 17 Dec 2022 20:36 )  x     / 0.04 ng/mL / 1834 U/L / x     / 13.4 ng/mL  CARDIAC MARKERS ( 17 Dec 2022 16:04 )  x     / 0.03 ng/mL / 1501 U/L / x     / 12.2 ng/mL      PT/INR - ( 19 Dec 2022 05:30 )   PT: 13.7 sec;   INR: 1.15          PTT - ( 19 Dec 2022 05:30 )  PTT:63.6 sec    I&O's Summary    18 Dec 2022 07:01  -  19 Dec 2022 07:00  --------------------------------------------------------  IN: 3484.6 mL / OUT: 3705 mL / NET: -220.4 mL    19 Dec 2022 07:01  -  19 Dec 2022 11:51  --------------------------------------------------------  IN: 454.4 mL / OUT: 795 mL / NET: -340.6 mL

## 2022-12-19 NOTE — CHART NOTE - NSCHARTNOTEFT_GEN_A_CORE
Per RN pt with suicidal ideations. Pt met in room.     States he was walking like normal 2 weeks ago and now he is bedbound and will need his leg amputated. He does not think he can live like this. Denies active suicidal ideations and denies a plan for suicide, but he feels hopeless as he will never live like a normal person again. States intubation was very traumatic and he couldn't even speak during that time. Denies verbal or visual hallucinations. Agreeable to speak to psychiatry in AM. Pt also needs U/S guided PIV for access but would like a break from being poked right now.     - Pt not actively suicidal, but endorses hopelessness. Likely adjustment disorder with depressed mood given recent hospitalization and changes to physical health. Will consult Psych in AM.   - No restraints as pt not suicidal and doing so may worsen condition.   - RN positioned near pt door for closer monitoring and will notify if he reports actual suicidal ideation.

## 2022-12-19 NOTE — PROGRESS NOTE ADULT - SUBJECTIVE AND OBJECTIVE BOX
STATUS POST:      POST OPERATIVE DAY #:     SUBJECTIVE: Pt seen and examined at bedside this am by surgery team. Tolerating diet, pain well controlled. Denies f/n/v/cp/sob.    MEDICATIONS  (STANDING):  acetaminophen   IVPB .. 1000 milliGRAM(s) IV Intermittent once  aspirin enteric coated 81 milliGRAM(s) Oral daily  chlorhexidine 0.12% Liquid 15 milliLiter(s) Oral Mucosa every 12 hours  chlorhexidine 2% Cloths 1 Application(s) Topical <User Schedule>  dextrose 5% + lactated ringers. 1000 milliLiter(s) (100 mL/Hr) IV Continuous <Continuous>  dextrose 5%. 1000 milliLiter(s) (50 mL/Hr) IV Continuous <Continuous>  dextrose 5%. 1000 milliLiter(s) (100 mL/Hr) IV Continuous <Continuous>  dextrose 50% Injectable 25 Gram(s) IV Push once  dextrose 50% Injectable 12.5 Gram(s) IV Push once  dextrose 50% Injectable 25 Gram(s) IV Push once  fentaNYL   Patch  50 MICROgram(s)/Hr. 1 Patch Transdermal every 48 hours  glucagon  Injectable 1 milliGRAM(s) IntraMuscular once  heparin  Infusion 1200 Unit(s)/Hr (12 mL/Hr) IV Continuous <Continuous>  influenza  Vaccine (HIGH DOSE) 0.7 milliLiter(s) IntraMuscular once  insulin lispro (ADMELOG) corrective regimen sliding scale   SubCutaneous every 6 hours  metoprolol succinate ER 25 milliGRAM(s) Oral daily  pantoprazole  Injectable 40 milliGRAM(s) IV Push two times a day  piperacillin/tazobactam IVPB.- 4.5 Gram(s) IV Intermittent once  piperacillin/tazobactam IVPB.. 4.5 Gram(s) IV Intermittent every 8 hours  silver sulfADIAZINE 1% Cream 1 Application(s) Topical daily  vancomycin  IVPB 1000 milliGRAM(s) IV Intermittent every 12 hours    MEDICATIONS  (PRN):  dextrose Oral Gel 15 Gram(s) Oral once PRN Blood Glucose LESS THAN 70 milliGRAM(s)/deciliter  HYDROmorphone  Injectable 0.5 milliGRAM(s) IV Push every 3 hours PRN Moderate Pain (4 - 6)  HYDROmorphone  Injectable 1 milliGRAM(s) IV Push every 3 hours PRN Severe Pain (7 - 10)      Vital Signs Last 24 Hrs  T(C): 36.3 (19 Dec 2022 09:00), Max: 37.3 (18 Dec 2022 22:04)  T(F): 97.4 (19 Dec 2022 09:00), Max: 99.2 (18 Dec 2022 22:04)  HR: 125 (19 Dec 2022 15:00) (83 - 133)  BP: 123/87 (19 Dec 2022 12:00) (98/70 - 135/67)  BP(mean): 102 (19 Dec 2022 12:00) (76 - 106)  RR: 15 (19 Dec 2022 15:00) (11 - 20)  SpO2: 98% (19 Dec 2022 15:00) (95% - 100%)    Parameters below as of 19 Dec 2022 15:00  Patient On (Oxygen Delivery Method): room air        Physical Exam  General: NAD, sedated in bed  Pulmonary: Nonlabored breathing, intubated  CV: NSR  Abd: soft, ileostomy pink with red rubber in place, ostomy bag with liquid output  Extremities: (-) edema, warm, well-perfused, no L DP/PT signals, R PT mono, no R DP, discoloration of the left calf        I&O's Detail    18 Dec 2022 07:01  -  19 Dec 2022 07:00  --------------------------------------------------------  IN:    Dexmedetomidine: 153.6 mL    dextrose 5% + lactated ringers: 2400 mL    Heparin: 156 mL    Heparin: 50 mL    Heparin Infusion: 50 mL    IV PiggyBack: 200 mL    IV PiggyBack: 125 mL    IV PiggyBack: 100 mL    IV PiggyBack: 250 mL  Total IN: 3484.6 mL    OUT:    Ileostomy (mL): 2540 mL    Indwelling Catheter - Urethral (mL): 1165 mL  Total OUT: 3705 mL    Total NET: -220.4 mL      19 Dec 2022 07:01  -  19 Dec 2022 16:10  --------------------------------------------------------  IN:    Dexmedetomidine: 6.4 mL    dextrose 5% + lactated ringers: 800 mL    Heparin: 96 mL  Total IN: 902.4 mL    OUT:    Ileostomy (mL): 1520 mL    Indwelling Catheter - Urethral (mL): 270 mL  Total OUT: 1790 mL    Total NET: -887.6 mL          LABS:                        9.8    14.15 )-----------( 397      ( 19 Dec 2022 13:22 )             29.4     12-19    135  |  104  |  7   ----------------------------<  97  3.7   |  24  |  0.67    Ca    8.1<L>      19 Dec 2022 05:30  Phos  2.1     12-18  Mg     1.8     12-18    TPro  5.0<L>  /  Alb  2.0<L>  /  TBili  1.5<H>  /  DBili  x   /  AST  61<H>  /  ALT  25  /  AlkPhos  92  12-18    PT/INR - ( 19 Dec 2022 13:22 )   PT: 13.7 sec;   INR: 1.15          PTT - ( 19 Dec 2022 13:22 )  PTT:63.0 sec      RADIOLOGY & ADDITIONAL STUDIES:     SUBJECTIVE: Pt seen and examined at bedside this am. Patient is complaining of blood in his stool    MEDICATIONS  (STANDING):  acetaminophen   IVPB .. 1000 milliGRAM(s) IV Intermittent once  aspirin enteric coated 81 milliGRAM(s) Oral daily  chlorhexidine 0.12% Liquid 15 milliLiter(s) Oral Mucosa every 12 hours  chlorhexidine 2% Cloths 1 Application(s) Topical <User Schedule>  dextrose 5% + lactated ringers. 1000 milliLiter(s) (100 mL/Hr) IV Continuous <Continuous>  dextrose 5%. 1000 milliLiter(s) (50 mL/Hr) IV Continuous <Continuous>  dextrose 5%. 1000 milliLiter(s) (100 mL/Hr) IV Continuous <Continuous>  dextrose 50% Injectable 25 Gram(s) IV Push once  dextrose 50% Injectable 12.5 Gram(s) IV Push once  dextrose 50% Injectable 25 Gram(s) IV Push once  fentaNYL   Patch  50 MICROgram(s)/Hr. 1 Patch Transdermal every 48 hours  glucagon  Injectable 1 milliGRAM(s) IntraMuscular once  heparin  Infusion 1200 Unit(s)/Hr (12 mL/Hr) IV Continuous <Continuous>  influenza  Vaccine (HIGH DOSE) 0.7 milliLiter(s) IntraMuscular once  insulin lispro (ADMELOG) corrective regimen sliding scale   SubCutaneous every 6 hours  metoprolol succinate ER 25 milliGRAM(s) Oral daily  pantoprazole  Injectable 40 milliGRAM(s) IV Push two times a day  piperacillin/tazobactam IVPB.- 4.5 Gram(s) IV Intermittent once  piperacillin/tazobactam IVPB.. 4.5 Gram(s) IV Intermittent every 8 hours  silver sulfADIAZINE 1% Cream 1 Application(s) Topical daily  vancomycin  IVPB 1000 milliGRAM(s) IV Intermittent every 12 hours    MEDICATIONS  (PRN):  dextrose Oral Gel 15 Gram(s) Oral once PRN Blood Glucose LESS THAN 70 milliGRAM(s)/deciliter  HYDROmorphone  Injectable 0.5 milliGRAM(s) IV Push every 3 hours PRN Moderate Pain (4 - 6)  HYDROmorphone  Injectable 1 milliGRAM(s) IV Push every 3 hours PRN Severe Pain (7 - 10)      Vital Signs Last 24 Hrs  T(C): 36.3 (19 Dec 2022 09:00), Max: 37.3 (18 Dec 2022 22:04)  T(F): 97.4 (19 Dec 2022 09:00), Max: 99.2 (18 Dec 2022 22:04)  HR: 125 (19 Dec 2022 15:00) (83 - 133)  BP: 123/87 (19 Dec 2022 12:00) (98/70 - 135/67)  BP(mean): 102 (19 Dec 2022 12:00) (76 - 106)  RR: 15 (19 Dec 2022 15:00) (11 - 20)  SpO2: 98% (19 Dec 2022 15:00) (95% - 100%)    Parameters below as of 19 Dec 2022 15:00  Patient On (Oxygen Delivery Method): room air        Physical Exam  General: NAD, sedated in bed  Pulmonary: Nonlabored breathing, intubated  CV: NSR  Abd: soft, ileostomy pink with red rubber in place, ostomy bag with liquid output  Extremities: (-) edema, warm, well-perfused, no L DP/PT signals, R PT mono, no R DP, discoloration of the left calf        I&O's Detail    18 Dec 2022 07:01  -  19 Dec 2022 07:00  --------------------------------------------------------  IN:    Dexmedetomidine: 153.6 mL    dextrose 5% + lactated ringers: 2400 mL    Heparin: 156 mL    Heparin: 50 mL    Heparin Infusion: 50 mL    IV PiggyBack: 200 mL    IV PiggyBack: 125 mL    IV PiggyBack: 100 mL    IV PiggyBack: 250 mL  Total IN: 3484.6 mL    OUT:    Ileostomy (mL): 2540 mL    Indwelling Catheter - Urethral (mL): 1165 mL  Total OUT: 3705 mL    Total NET: -220.4 mL      19 Dec 2022 07:01  -  19 Dec 2022 16:10  --------------------------------------------------------  IN:    Dexmedetomidine: 6.4 mL    dextrose 5% + lactated ringers: 800 mL    Heparin: 96 mL  Total IN: 902.4 mL    OUT:    Ileostomy (mL): 1520 mL    Indwelling Catheter - Urethral (mL): 270 mL  Total OUT: 1790 mL    Total NET: -887.6 mL          LABS:                        9.8    14.15 )-----------( 397      ( 19 Dec 2022 13:22 )             29.4     12-19    135  |  104  |  7   ----------------------------<  97  3.7   |  24  |  0.67    Ca    8.1<L>      19 Dec 2022 05:30  Phos  2.1     12-18  Mg     1.8     12-18    TPro  5.0<L>  /  Alb  2.0<L>  /  TBili  1.5<H>  /  DBili  x   /  AST  61<H>  /  ALT  25  /  AlkPhos  92  12-18    PT/INR - ( 19 Dec 2022 13:22 )   PT: 13.7 sec;   INR: 1.15          PTT - ( 19 Dec 2022 13:22 )  PTT:63.0 sec      RADIOLOGY & ADDITIONAL STUDIES:   SUBJECTIVE: Pt seen and examined at bedside this am. Patient is complaining of pain in his leg. He also notes that he has blood in his stool    MEDICATIONS  (STANDING):  acetaminophen   IVPB .. 1000 milliGRAM(s) IV Intermittent once  aspirin enteric coated 81 milliGRAM(s) Oral daily  chlorhexidine 0.12% Liquid 15 milliLiter(s) Oral Mucosa every 12 hours  chlorhexidine 2% Cloths 1 Application(s) Topical <User Schedule>  dextrose 5% + lactated ringers. 1000 milliLiter(s) (100 mL/Hr) IV Continuous <Continuous>  dextrose 5%. 1000 milliLiter(s) (50 mL/Hr) IV Continuous <Continuous>  dextrose 5%. 1000 milliLiter(s) (100 mL/Hr) IV Continuous <Continuous>  dextrose 50% Injectable 25 Gram(s) IV Push once  dextrose 50% Injectable 12.5 Gram(s) IV Push once  dextrose 50% Injectable 25 Gram(s) IV Push once  fentaNYL   Patch  50 MICROgram(s)/Hr. 1 Patch Transdermal every 48 hours  glucagon  Injectable 1 milliGRAM(s) IntraMuscular once  heparin  Infusion 1200 Unit(s)/Hr (12 mL/Hr) IV Continuous <Continuous>  influenza  Vaccine (HIGH DOSE) 0.7 milliLiter(s) IntraMuscular once  insulin lispro (ADMELOG) corrective regimen sliding scale   SubCutaneous every 6 hours  metoprolol succinate ER 25 milliGRAM(s) Oral daily  pantoprazole  Injectable 40 milliGRAM(s) IV Push two times a day  piperacillin/tazobactam IVPB.- 4.5 Gram(s) IV Intermittent once  piperacillin/tazobactam IVPB.. 4.5 Gram(s) IV Intermittent every 8 hours  silver sulfADIAZINE 1% Cream 1 Application(s) Topical daily  vancomycin  IVPB 1000 milliGRAM(s) IV Intermittent every 12 hours    MEDICATIONS  (PRN):  dextrose Oral Gel 15 Gram(s) Oral once PRN Blood Glucose LESS THAN 70 milliGRAM(s)/deciliter  HYDROmorphone  Injectable 0.5 milliGRAM(s) IV Push every 3 hours PRN Moderate Pain (4 - 6)  HYDROmorphone  Injectable 1 milliGRAM(s) IV Push every 3 hours PRN Severe Pain (7 - 10)      Vital Signs Last 24 Hrs  T(C): 36.3 (19 Dec 2022 09:00), Max: 37.3 (18 Dec 2022 22:04)  T(F): 97.4 (19 Dec 2022 09:00), Max: 99.2 (18 Dec 2022 22:04)  HR: 125 (19 Dec 2022 15:00) (83 - 133)  BP: 123/87 (19 Dec 2022 12:00) (98/70 - 135/67)  BP(mean): 102 (19 Dec 2022 12:00) (76 - 106)  RR: 15 (19 Dec 2022 15:00) (11 - 20)  SpO2: 98% (19 Dec 2022 15:00) (95% - 100%)    Parameters below as of 19 Dec 2022 15:00  Patient On (Oxygen Delivery Method): room air        Physical Exam  General: NAD, sedated in bed  Pulmonary: Nonlabored breathing, intubated  CV: NSR  Abd: soft, ileostomy pink with red rubber in place, ostomy bag with liquid output (no blood), bloody stool noted from below  Extremities: (-) edema, warm, well-perfused, no L DP/PT signals, R PT mono, no R DP, discoloration of the left calf, calf blisters have popped        I&O's Detail    18 Dec 2022 07:01  -  19 Dec 2022 07:00  --------------------------------------------------------  IN:    Dexmedetomidine: 153.6 mL    dextrose 5% + lactated ringers: 2400 mL    Heparin: 156 mL    Heparin: 50 mL    Heparin Infusion: 50 mL    IV PiggyBack: 200 mL    IV PiggyBack: 125 mL    IV PiggyBack: 100 mL    IV PiggyBack: 250 mL  Total IN: 3484.6 mL    OUT:    Ileostomy (mL): 2540 mL    Indwelling Catheter - Urethral (mL): 1165 mL  Total OUT: 3705 mL    Total NET: -220.4 mL      19 Dec 2022 07:01  -  19 Dec 2022 16:10  --------------------------------------------------------  IN:    Dexmedetomidine: 6.4 mL    dextrose 5% + lactated ringers: 800 mL    Heparin: 96 mL  Total IN: 902.4 mL    OUT:    Ileostomy (mL): 1520 mL    Indwelling Catheter - Urethral (mL): 270 mL  Total OUT: 1790 mL    Total NET: -887.6 mL          LABS:                        9.8    14.15 )-----------( 397      ( 19 Dec 2022 13:22 )             29.4     12-19    135  |  104  |  7   ----------------------------<  97  3.7   |  24  |  0.67    Ca    8.1<L>      19 Dec 2022 05:30  Phos  2.1     12-18  Mg     1.8     12-18    TPro  5.0<L>  /  Alb  2.0<L>  /  TBili  1.5<H>  /  DBili  x   /  AST  61<H>  /  ALT  25  /  AlkPhos  92  12-18    PT/INR - ( 19 Dec 2022 13:22 )   PT: 13.7 sec;   INR: 1.15          PTT - ( 19 Dec 2022 13:22 )  PTT:63.0 sec      RADIOLOGY & ADDITIONAL STUDIES:   SUBJECTIVE: Pt seen and examined at bedside this am. Patient is complaining of pain in his leg. He also notes that he has blood in his stool    MEDICATIONS  (STANDING):  acetaminophen   IVPB .. 1000 milliGRAM(s) IV Intermittent once  aspirin enteric coated 81 milliGRAM(s) Oral daily  chlorhexidine 0.12% Liquid 15 milliLiter(s) Oral Mucosa every 12 hours  chlorhexidine 2% Cloths 1 Application(s) Topical <User Schedule>  dextrose 5% + lactated ringers. 1000 milliLiter(s) (100 mL/Hr) IV Continuous <Continuous>  dextrose 5%. 1000 milliLiter(s) (50 mL/Hr) IV Continuous <Continuous>  dextrose 5%. 1000 milliLiter(s) (100 mL/Hr) IV Continuous <Continuous>  dextrose 50% Injectable 25 Gram(s) IV Push once  dextrose 50% Injectable 12.5 Gram(s) IV Push once  dextrose 50% Injectable 25 Gram(s) IV Push once  fentaNYL   Patch  50 MICROgram(s)/Hr. 1 Patch Transdermal every 48 hours  glucagon  Injectable 1 milliGRAM(s) IntraMuscular once  heparin  Infusion 1200 Unit(s)/Hr (12 mL/Hr) IV Continuous <Continuous>  influenza  Vaccine (HIGH DOSE) 0.7 milliLiter(s) IntraMuscular once  insulin lispro (ADMELOG) corrective regimen sliding scale   SubCutaneous every 6 hours  metoprolol succinate ER 25 milliGRAM(s) Oral daily  pantoprazole  Injectable 40 milliGRAM(s) IV Push two times a day  piperacillin/tazobactam IVPB.- 4.5 Gram(s) IV Intermittent once  piperacillin/tazobactam IVPB.. 4.5 Gram(s) IV Intermittent every 8 hours  silver sulfADIAZINE 1% Cream 1 Application(s) Topical daily  vancomycin  IVPB 1000 milliGRAM(s) IV Intermittent every 12 hours    MEDICATIONS  (PRN):  dextrose Oral Gel 15 Gram(s) Oral once PRN Blood Glucose LESS THAN 70 milliGRAM(s)/deciliter  HYDROmorphone  Injectable 0.5 milliGRAM(s) IV Push every 3 hours PRN Moderate Pain (4 - 6)  HYDROmorphone  Injectable 1 milliGRAM(s) IV Push every 3 hours PRN Severe Pain (7 - 10)      Vital Signs Last 24 Hrs  T(C): 36.3 (19 Dec 2022 09:00), Max: 37.3 (18 Dec 2022 22:04)  T(F): 97.4 (19 Dec 2022 09:00), Max: 99.2 (18 Dec 2022 22:04)  HR: 125 (19 Dec 2022 15:00) (83 - 133)  BP: 123/87 (19 Dec 2022 12:00) (98/70 - 135/67)  BP(mean): 102 (19 Dec 2022 12:00) (76 - 106)  RR: 15 (19 Dec 2022 15:00) (11 - 20)  SpO2: 98% (19 Dec 2022 15:00) (95% - 100%)    Parameters below as of 19 Dec 2022 15:00  Patient On (Oxygen Delivery Method): room air        Physical Exam  General: NAD,   Pulmonary: Nonlabored breathing,   CV: NSR  Abd: soft, ileostomy pink with red rubber in place, ostomy bag with liquid output (no blood), bloody stool noted from below  Extremities: (-) edema, warm, well-perfused, no L DP/PT signals, R PT mono, no R DP, discoloration of the left calf, calf blisters have popped        I&O's Detail    18 Dec 2022 07:01  -  19 Dec 2022 07:00  --------------------------------------------------------  IN:    Dexmedetomidine: 153.6 mL    dextrose 5% + lactated ringers: 2400 mL    Heparin: 156 mL    Heparin: 50 mL    Heparin Infusion: 50 mL    IV PiggyBack: 200 mL    IV PiggyBack: 125 mL    IV PiggyBack: 100 mL    IV PiggyBack: 250 mL  Total IN: 3484.6 mL    OUT:    Ileostomy (mL): 2540 mL    Indwelling Catheter - Urethral (mL): 1165 mL  Total OUT: 3705 mL    Total NET: -220.4 mL      19 Dec 2022 07:01  -  19 Dec 2022 16:10  --------------------------------------------------------  IN:    Dexmedetomidine: 6.4 mL    dextrose 5% + lactated ringers: 800 mL    Heparin: 96 mL  Total IN: 902.4 mL    OUT:    Ileostomy (mL): 1520 mL    Indwelling Catheter - Urethral (mL): 270 mL  Total OUT: 1790 mL    Total NET: -887.6 mL          LABS:                        9.8    14.15 )-----------( 397      ( 19 Dec 2022 13:22 )             29.4     12-19    135  |  104  |  7   ----------------------------<  97  3.7   |  24  |  0.67    Ca    8.1<L>      19 Dec 2022 05:30  Phos  2.1     12-18  Mg     1.8     12-18    TPro  5.0<L>  /  Alb  2.0<L>  /  TBili  1.5<H>  /  DBili  x   /  AST  61<H>  /  ALT  25  /  AlkPhos  92  12-18    PT/INR - ( 19 Dec 2022 13:22 )   PT: 13.7 sec;   INR: 1.15          PTT - ( 19 Dec 2022 13:22 )  PTT:63.0 sec      RADIOLOGY & ADDITIONAL STUDIES:

## 2022-12-19 NOTE — CONSULT NOTE ADULT - ASSESSMENT
78 y/o M with no significant PMHx found unresponsive at NH, resolved after narcan, brought to [facility]. Found to have LV dysfunction, PE, atrial flutter, and large LV thrombus. Transferred to [facility] for further management with course complicated by acute mesenteric ischemia s/p embolectomy and bowel resection, septic shock, LLE arterial occlusion.  Pt w difficulty coping, Palliative Care consulted for psychosocial support and ACP.  78 y/o M with no significant PMHx found unresponsive at NH, resolved after narcan, and brought to [facility]. Found to have LV dysfunction, PE, atrial flutter, large LV thrombus. Transferred to [facility] for further management, course complicated by acute mesenteric ischemia s/p embolectomy and bowel resection, septic shock, LLE arterial occlusion.  Pt w difficulty coping, Palliative Care consulted for psychosocial support and ACP.

## 2022-12-19 NOTE — CONSULT NOTE ADULT - SUBJECTIVE AND OBJECTIVE BOX
HPI:  77 year old male with no documented past medical history presenting from [facility] after being found unresponsive at his nursing home s/p narcan with resolution. Found to have atrial flutter. Echo showed severely reduced LV function with an LV thrombus. Heparin gtt was started and pt transferred to [facility] for LORENZO and possible ablation. Vascular consulted for cold leg. Bilateral Duplex (12/8) showed acute thrombus completely occluding the left popliteal artery and acute thrombus incompletely occluding the left dorsalis pedis artery and likely acute thrombus completely occluding the left mid superficial femoral artery and extending to the left popliteal artery. Left DP with absent signals. Patient noted to have acute abdominal pain overnight with bloody diarrhea c/f acute mesenteric ischemia and taken emergently to the OR. Now POD2 s/p ex lap, SMA embolectomy, and SBR (80 cm), s/p second look with additional small bowel resection (50cm) with wound left open (abthera in place) with plans for second look today. No need mesenteric angiogram today as patient is off pressors, extubated and hemodynamically stable.     Chart reviewed, hospital course noted. Met w pt at bedside, awake and alert. Endorses acute abdominal pain that started acutely last night, comprehensive description noted below. Has been receiving PRN hydromorphone 0.5-1 mg IV PRN with acceptable analgesic relief, see PRN below. Denies acute nausea, myoclonus, over sedation or constipation with opioids. Distressing symptoms of depressed mood, passive SI without plan, is future oriented. Denies anxiety. ROS as below, collateral obtained from chart.     ACP reviewed and noted below.     PAST MEDICAL & SURGICAL HISTORY:      FAMILY HISTORY:   Reviewed; no history of     in mother or father    Opiate Naive (Y/N):  Yes  iStop reviewed (Y/N): Yes.   No Rx found on iStop review (Ref#:     [id number]      Items that are not checked are not present  PSYCHOSOCIAL ASSESSMENT:  - Significant other/partner: [  ]  Children: [x  ]  - Living Situation: Home [  ] Long term care [x  ]  Rehab[  ]  Other[  ]  - Support system: strong[  ] adequate[ x ] inadequate[  ]  - Hinduism/Spiritual practice: deferred   - Role of organized Mormon important [  ] some [  ] unable to assess dt pt mentation [  ]  - Coping: well[  ]  with difficulty[x  ]  poor coping[  ]  unable to assess dt pt mentation [  ]      ADVANCE DIRECTIVES:    - MOLST[  ]    Living Will [  ]   DECISION MAKER(s):  - Health Care Proxy(s) [  ]  Surrogate(s)[  ] Guardian[  ]           Name(s)/Phone Number(s):     BASELINE (I)ADLs (prior to admission):  - [place]:  Total[  ] Moderate[x  ] Dependent[  ]    Allergies    No Known Allergies    Intolerances        Medications:      MEDICATIONS  (STANDING):  aspirin enteric coated 81 milliGRAM(s) Oral daily  chlorhexidine 2% Cloths 1 Application(s) Topical <User Schedule>  dextrose 10%. 1000 milliLiter(s) (50 mL/Hr) IV Continuous <Continuous>  dextrose 5%. 1000 milliLiter(s) (50 mL/Hr) IV Continuous <Continuous>  dextrose 5%. 1000 milliLiter(s) (100 mL/Hr) IV Continuous <Continuous>  dextrose 50% Injectable 25 Gram(s) IV Push once  dextrose 50% Injectable 12.5 Gram(s) IV Push once  dextrose 50% Injectable 25 Gram(s) IV Push once  fentaNYL   Patch  50 MICROgram(s)/Hr. 1 Patch Transdermal every 48 hours  glucagon  Injectable 1 milliGRAM(s) IntraMuscular once  heparin  Infusion 1300 Unit(s)/Hr (13 mL/Hr) IV Continuous <Continuous>  influenza  Vaccine (HIGH DOSE) 0.7 milliLiter(s) IntraMuscular once  insulin lispro (ADMELOG) corrective regimen sliding scale   SubCutaneous every 6 hours  loperamide 2 milliGRAM(s) Oral daily  metoprolol succinate ER 50 milliGRAM(s) Oral daily  pantoprazole  Injectable 40 milliGRAM(s) IV Push two times a day  silver sulfADIAZINE 1% Cream 1 Application(s) Topical daily  spironolactone 25 milliGRAM(s) Oral daily  valsartan 20 milliGRAM(s) Oral daily    MEDICATIONS  (PRN):  acetaminophen     Tablet .. 650 milliGRAM(s) Oral every 6 hours PRN Temp greater or equal to 38C (100.4F), Mild Pain (1 - 3)  dextrose Oral Gel 15 Gram(s) Oral once PRN Blood Glucose LESS THAN 70 milliGRAM(s)/deciliter  HYDROmorphone  Injectable 0.5 milliGRAM(s) IV Push every 3 hours PRN Moderate Pain (4 - 6)  HYDROmorphone  Injectable 1 milliGRAM(s) IV Push every 3 hours PRN Severe Pain (7 - 10)      PRESENT SYMPTOMS:   [ ]Unable to obtain due to poor mentation   Source if other than patient:  [ ]Family   [ ]Team     Pain [ x ]  Location :    abdomen     Quality: sharp, knife like, electric   Radiation: diffuse   Timing: intermittent with repositioning, deep breathing, coughing   Severity in last 24h (0-10 scale) : 8-9/10  Current score (0-10 scale): 5/10  Improves with:  current regimen, opioids     PAIN AD Score:  n/a  [email or web address] (press ctrl +  left click to view)    Analgesic Use (PRNs) for past 24 hours:  - tylenol x2   - fent 50 mcg/hr q48  - dil 0.5 mg IV  today at 1400    If [  ], pt denies symptom.   Dyspnea:         [  ]  Anxiety:           [  ]  Difficulty sleeping: [x  ]  Fatigue:           [x  ]  Nausea:           [  ]  Loss of appetite:     [x  ]  Dysphagia: [  ]  Constipation:   [  ]        Grief Present   [x  ] Yes   [  ] No   Other Symptoms: depressed mood following news that he will need amputation. expressing SI, without plan, is future oriented and "not going through with anything" but feels helpless     All other review of systems negative [x  ]    ECOG Performance:     4  Current Palliative Performance Scale/Karnofsky Score:    %  Preadmit Karnofsky:   %          PEx:  General: frail elderly man sitting up in bed alert  oriented x   3 verbal  Behavioral: depressed mood, appropriate affect,  Cooperative  HEENT: atraumatic, No abnormal lesions, No dry mouth, trace  temporal wasting  RESP: Reg rhythm,  No tachypnea/labored breathing,  CTAB, diminished bilat bases  CV: RRR, S1S2, No  tachycardia  GI: soft non distended +tender with palpation   :  bowen  MUSK: weakness x4,  No edema, fully  BB/WC bound  SKIN: No abnormal skin lesions, Poor skin turgor  NEURO:  No deficits, cognitive impairment, encephalopathic dsyphagia dysarthria paresis  Oral intake ability: e full capability      Labs:  Reviewed    RADIOLOGY & ADDITIONAL STUDIES:  Imaging:  Reviewed    GOC discussion:

## 2022-12-19 NOTE — PROGRESS NOTE ADULT - ASSESSMENT
78yo M with no significant PMH/PSx admitted to cardiology service on 12/7 in the setting of severely reduced LV function 2/2 an LV thrombus. Pt now with several days of post-prandial abdominal pain and loose BMs, initially suspected to be 2/2 opioid withdrawal, however on evening of 12/10 had a large bloody BM. 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 Vascular surgery (already following) with plan for OR. General surgery consulted for possible operative assistance in the setting of bowel ischemia. Now POD2 s/p ex lap, SMA embolectomy and small bowel resection. Plan to RTOR today for second look.    Recommendations:    - CLD  - D/c a-line and bowen  - Vascular recommendations for L LE  - F/u cardiology recs  - Monitor UOP  - Monitor OLGA and vac output  - Rest of care per SICU    - Team 4 will continue to follow

## 2022-12-19 NOTE — CONSULT NOTE ADULT - PROBLEM SELECTOR RECOMMENDATION 4
.  SI overnight following news of indicated amputation of LLE. no plan. at consult is depressed, but future oriented.   -  consult pending   - support provided at bedside

## 2022-12-19 NOTE — CONSULT NOTE ADULT - CONVERSATION DETAILS
Introduced role of palliative medicine for support, symptom management, and ACP. Reviewed hospital course, complications, emphasizing LLE thrombi and discussion with Vascular team today, who explained future indication for LLE  amputation. Discussed disease trajectory if pt were to decline amputation. Pt with significant difficulty coping with today's news and confirms SI overnight, but ultimately wishes to go through with "what the doctors recommend."  Reviewed advance directives. Pt has not elected HCP, reviewed role, indication, and circumstances when form would come into effect. Pt with two adult children, however he elects his sister, Sophie Strange, who he trusts to make informed medical decisions if he were unable to make needs known. From completed, copy placed in physical chart. Questions answered, support provided.

## 2022-12-19 NOTE — CONSULT NOTE ADULT - PROBLEM SELECTOR RECOMMENDATION 9
.  iso Acute mesenteric ischemia. endorses acceptable analgesic relief without dose limiting side effects w opioids  - cw fentanyl 50 mcg/hr q48  - cw tylenol PRN mild pain   - cw dil 0.5 mg - 1 mg IV q3 mod/severe pain -- hold for oversedation   - BR as below   - hepparin gtt per primary team, vascular recs appreciated .  iso Acute mesenteric ischemia. endorses acceptable analgesic relief without dose limiting side effects w opioids  - cw fentanyl 50 mcg/hr q48  - cw tylenol PRN mild pain   - cw dil 0.5 mg - 1 mg IV q3 mod/severe pain -- hold for oversedation   - BR as below   - hepparin gtt per primary team, vascular recs appreciated  - Massage therapy consulted

## 2022-12-19 NOTE — CONSULT NOTE ADULT - PROBLEM SELECTOR RECOMMENDATION 5
.  also with limb ischemia 2/2 LV thrombus emobolization, s/p Exlap and Embolectomy of SMA and SB resection  - heparin gtt per primary team, vasc recs  - Vascular:  amputation of LLE pending demarcation of extremity   - GOC are to move forward w amputation as indicated

## 2022-12-19 NOTE — PROGRESS NOTE ADULT - ASSESSMENT
Assessment: 76 y/o M with Significant PMHx of IVDU found unresponsive at his nursing home, resolved after Narcan in the field, and brought to [facility]. Found to have PE, atrial flutter, and large LV thrombus on echo. Transferred to [facility] for further management. Pt C/o abdominal pain on 12/10 CTA showing mid SMA with embolus. Abnormal distal small bowel loops and cecum with dilatation and pneumatosis suggesting infarcted bowel. One or two tiny foci of  intrahepatic portal vein pneumatosis. Segmental infarction upper pole left kidney. Now s/p Exlap Embolectomy of SMA and resection of 80cm of SB pt left in discontinuity and transferred to SICU intubated. S/p second look on 12/14 and most recently s/p OR for 3rd look, end-to-end anastomosis of remaining bowel, loop ileostomy and abdomen closure. Remains in SICU now extubated.    Neuro: Fentanyl patch 50mcg q48h; IV Tylenol ATC, PRN Dilaudid   Psych: Enhanced supervision, Precedex gtt for agitation  CV: Septic shock- resolved; AFlutter s/p Amio gtt and Digoxin; CHF- LVEF 15%, LV thrombus, LLE limb ischemia- Heparin gtt (goal PTT 60-90),  PA QD.   Pulm: Extubated to RA on 12/17, IS  GI: NPO, D5LR @100/hr  : Uriarte- Infarction of upper pole of Left Kidney, Renal US negative on 12/12.   ID: Ischemic bowel: Zosyn (12/11-12/18)    Heme: Hep C positive; Active T&S  Endo: mISS  PPx: SCD, Heparin gtt (PTT goal 60-90)   Lines: Rt TLC (12/11-12/17), L radial connie (12/11--), PIVs   Wounds: abdomen closed, RLQ loop ileostomy, LLE showing signs of demarcation and ruptured blisters   PT/OT: Ordered 12/17  Dispo: SICU    Assessment: 76 y/o M with Significant PMHx of IVDU found unresponsive at his nursing home, resolved after Narcan in the field, and brought to [facility]. Found to have PE, atrial flutter, and large LV thrombus on echo. Transferred to [facility] for further management. Pt C/o abdominal pain on 12/10 CTA showing mid SMA with embolus. Abnormal distal small bowel loops and cecum with dilatation and pneumatosis suggesting infarcted bowel. One or two tiny foci of  intrahepatic portal vein pneumatosis. Segmental infarction upper pole left kidney. Now s/p Exlap Embolectomy of SMA and resection of 80cm of SB pt left in discontinuity and transferred to SICU intubated. S/p second look on 12/14 and most recently s/p OR for 3rd look, end-to-end anastomosis of remaining bowel, loop ileostomy and abdomen closure. Remains in SICU now extubated.    Neuro: Fentanyl patch 50mcg q48h; IV Tylenol ATC, PRN Dilaudid   Psych: Enhanced supervision, now off Precdex for agiation  CV: Septic shock- resolved; AFlutter s/p Amio gtt and Digoxin; CHF- LVEF 15%, LV thrombus, LLE limb ischemia- Heparin gtt (goal PTT 60-90),  TN QD.   Pulm: Extubated to RA on 12/17, IS  GI: NPO, D5LR @100/hr  : Uriarte- Infarction of upper pole of Left Kidney, Renal US negative on 12/12.   ID: Ischemic bowel: Zosyn (12/11-12/18)    Heme: Hep C positive; Active T&S  Endo: mISS  PPx: SCD, Heparin gtt (PTT goal 60-90)   Lines: Rt TLC (12/11-12/17), L radial connie (12/11--), PIVs   Wounds: abdomen closed, RLQ loop ileostomy, LLE showing signs of demarcation and ruptured blisters   PT/OT: Ordered 12/17  Dispo: SICU

## 2022-12-19 NOTE — CONSULT NOTE ADULT - PROBLEM SELECTOR RECOMMENDATION 6
.  EF 10-15% with severely reduced RV function 2/2 tachy-induced cardiomyopathy iso aflutter. comfortable on NC.   - HF ccu and EP recs appreciated   - long term prognosis is limited, goc discussions to continue

## 2022-12-19 NOTE — CONSULT NOTE ADULT - PROBLEM SELECTOR RECOMMENDATION 7
.  - see GOC   In addition to the E/M visit, an advance care planning meeting was performed. Start time: 430; End time:500 ; Total time: 30 min. A face to face meeting to discuss advance care planning was held today regarding: OLIVIA MAYERS. Primary decision maker:  Patient is unable to participate in decision making;  Alternate/surrogate: Sophie Strange    . Discussed advance directives including, but not limited to, healthcare proxy, patient's values/goals. Decision regarding code status:  FULL CODE; Documentation completed today:  HCP GOC note .  - see GOC, discussions to continue     In addition to the E/M visit, an advance care planning meeting was performed. Start time: 430; End time:500 ; Total time: 30 min. A face to face meeting to discuss advance care planning was held today regarding: OLIVIA MAYERS. Primary decision maker:  Patient is unable to participate in decision making;  Alternate/surrogate: Sophie Strange    . Discussed advance directives including, but not limited to, healthcare proxy, patient's values/goals. Decision regarding code status:  FULL CODE; Documentation completed today:  HCP GOC note

## 2022-12-19 NOTE — PROGRESS NOTE ADULT - ATTENDING COMMENTS
78 YO M with a history of cocaine/opiate abuse and resident of a nursing facility who was [place] [facility] [place]responsiveness that was alleviated with narcan and found to be in rapid atrial flutter with severe LV dysfunction with LV hrombus and subsegemental PE prompting transfer to [facility] on 12/7. He was also found to have limb ischemia with arterial thrombosis of LLE vessels. He was being considered for rhythm control when he developed abdominal pain and found to have acute mesenteric ischemia and underwent emergent bowel resection and SMA thrombectomy. His LV thrombus us no longer apparent on TTE and has likely embolized.     He had a prolonged open abdomen which is now closed with placement of ostomy. From a HF perspective, he is euvolemic with signs of preserved cardiac output    REVIEW OF STUDIES  TTE: LV 5.0 cm, LVEF 10-15% with global hypokinesis, 2.5 cm mobile LV thrombus, severe RV dysfunction  LORENZO: no ISIAH thrombus     PLAN  # Acute systolic heart failure  - GDMT: start metoprolol succiante 25 mg daily when able to tolerate PO. eventual RAASi/MRA when tolerating PO  - Euvolemic off diuretics   - Euvolemic off diuretic  - Etiology is possibly tachycardia induced. EP following, would discuss rhythm control pending resolution of active issues     # Atrial flutter  - EP following  - Continue heparin drip  - Rates controlled at this time   - Possible candidate for DCCV pending resolution of acute surgical issues    # Acute limb ischemia and mesenteric ischemia from LV thrombus  - management per surgery/vascular  - continue anticoagulation    # GOLDIE  - likely post-op ATN and renal infarction, resolved .

## 2022-12-19 NOTE — PROGRESS NOTE ADULT - SUBJECTIVE AND OBJECTIVE BOX
INTERVAL/OVERNIGHT EVENTS:: PTT at 2300 66.7. No chg to hep gtt    SUBJECTIVE: Patient seen at bedside in no acute distress. On light sedation with precedex, but arousable and able to hold conversation. No CP, SOB, fevers or chills.     Neurologic Medications  aspirin Suppository 150 milliGRAM(s) Rectal daily  fentaNYL   Patch  50 MICROgram(s)/Hr. 1 Patch Transdermal every 48 hours  HYDROmorphone  Injectable 0.5 milliGRAM(s) IV Push every 3 hours PRN Moderate Pain (4 - 6)  HYDROmorphone  Injectable 1 milliGRAM(s) IV Push every 3 hours PRN Severe Pain (7 - 10)    Respiratory Medications    Cardiovascular Medications    Gastrointestinal Medications  dextrose 5% + lactated ringers. 1000 milliLiter(s) IV Continuous <Continuous>  dextrose 5%. 1000 milliLiter(s) IV Continuous <Continuous>  dextrose 5%. 1000 milliLiter(s) IV Continuous <Continuous>  pantoprazole  Injectable 40 milliGRAM(s) IV Push two times a day    Genitourinary Medications    Hematologic/Oncologic Medications  heparin  Infusion 1200 Unit(s)/Hr IV Continuous <Continuous>  influenza  Vaccine (HIGH DOSE) 0.7 milliLiter(s) IntraMuscular once    Antimicrobial/Immunologic Medications    Endocrine/Metabolic Medications  dextrose 50% Injectable 25 Gram(s) IV Push once  dextrose 50% Injectable 12.5 Gram(s) IV Push once  dextrose 50% Injectable 25 Gram(s) IV Push once  dextrose Oral Gel 15 Gram(s) Oral once PRN Blood Glucose LESS THAN 70 milliGRAM(s)/deciliter  glucagon  Injectable 1 milliGRAM(s) IntraMuscular once  insulin lispro (ADMELOG) corrective regimen sliding scale   SubCutaneous every 6 hours    Topical/Other Medications  chlorhexidine 0.12% Liquid 15 milliLiter(s) Oral Mucosa every 12 hours  chlorhexidine 2% Cloths 1 Application(s) Topical <User Schedule>      MEDICATIONS  (PRN):  dextrose Oral Gel 15 Gram(s) Oral once PRN Blood Glucose LESS THAN 70 milliGRAM(s)/deciliter  HYDROmorphone  Injectable 0.5 milliGRAM(s) IV Push every 3 hours PRN Moderate Pain (4 - 6)  HYDROmorphone  Injectable 1 milliGRAM(s) IV Push every 3 hours PRN Severe Pain (7 - 10)      I&O's Detail    18 Dec 2022 07:01  -  19 Dec 2022 07:00  --------------------------------------------------------  IN:    Dexmedetomidine: 153.6 mL    dextrose 5% + lactated ringers: 2400 mL    Heparin: 156 mL    Heparin: 50 mL    Heparin Infusion: 50 mL    IV PiggyBack: 200 mL    IV PiggyBack: 125 mL    IV PiggyBack: 100 mL    IV PiggyBack: 250 mL  Total IN: 3484.6 mL    OUT:    Ileostomy (mL): 2540 mL    Indwelling Catheter - Urethral (mL): 1165 mL  Total OUT: 3705 mL    Total NET: -220.4 mL      19 Dec 2022 07:01  -  19 Dec 2022 10:19  --------------------------------------------------------  IN:    Dexmedetomidine: 6.4 mL    dextrose 5% + lactated ringers: 300 mL    Heparin: 36 mL  Total IN: 342.4 mL    OUT:    Ileostomy (mL): 400 mL    Indwelling Catheter - Urethral (mL): 270 mL  Total OUT: 670 mL    Total NET: -327.6 mL          Vital Signs Last 24 Hrs  T(C): 36.3 (19 Dec 2022 09:00), Max: 37.3 (18 Dec 2022 13:07)  T(F): 97.4 (19 Dec 2022 09:00), Max: 99.2 (18 Dec 2022 22:04)  HR: 102 (19 Dec 2022 10:00) (83 - 120)  BP: 112/78 (19 Dec 2022 10:00) (98/70 - 135/67)  BP(mean): 90 (19 Dec 2022 10:00) (76 - 106)  RR: 16 (19 Dec 2022 10:00) (11 - 20)  SpO2: 95% (19 Dec 2022 10:00) (95% - 100%)    Parameters below as of 19 Dec 2022 09:00  Patient On (Oxygen Delivery Method): room air        General: Cachetic elderly male, appears stated age, responsive to questions although agitated  Neuro: Grossly intact bilaterally   HEENT: Normocephalic, atraumatic, trachea midline, no JVD   Chest: Equal rise and fall of chest   Heart: Regular S1/S2, no murmurs rubs or gallops   Lungs: Unlabored breathing on room air; Clear to auscultation bilaterally, no adventitious sounds   Abdomen: Soft, non-distended, normoactive bowel sounds throughout, no tenderness to palpation in all 4 quadrants; RLQ ostomy pink with gas in bag and sweat, midline laparotomy incision is clean/dry/intact    Upper Extremities: Trace edema in hands, freely mobile bilaterally   Lower Extremities: No edema, SCDs in place on RLE; RLE warm, LLE cool with ruptured blisters from upper calf to toes   Neuro: Severe sensory and motor deficits in LLE. Absent sensation in left toes/foot  Skin: Warm except for LLE, non-diaphoretic throughout       LABS:                        9.3    13.23 )-----------( 373      ( 19 Dec 2022 05:30 )             27.8     12-19    135  |  104  |  7   ----------------------------<  97  3.7   |  24  |  0.67    Ca    8.1<L>      19 Dec 2022 05:30  Phos  2.1     12-18  Mg     1.8     12-18    TPro  5.0<L>  /  Alb  2.0<L>  /  TBili  1.5<H>  /  DBili  x   /  AST  61<H>  /  ALT  25  /  AlkPhos  92  12-18    PT/INR - ( 19 Dec 2022 05:30 )   PT: 13.7 sec;   INR: 1.15          PTT - ( 19 Dec 2022 05:30 )  PTT:63.6 sec  Urinalysis Basic - ( 17 Dec 2022 11:23 )    Color: Yellow / Appearance: Clear / SG: >=1.030 / pH: x  Gluc: x / Ketone: NEGATIVE  / Bili: Negative / Urobili: 0.2 E.U./dL   Blood: x / Protein: Trace mg/dL / Nitrite: NEGATIVE   Leuk Esterase: NEGATIVE / RBC: Many /HPF / WBC 5-10 /HPF   Sq Epi: x / Non Sq Epi: 0-5 /HPF / Bacteria: Present /HPF        RADIOLOGY & ADDITIONAL STUDIES:     INTERVAL/OVERNIGHT EVENTS:: PTT at 2300 66.7. No chg to hep gtt    SUBJECTIVE: Patient seen at bedside in no acute distress. On light sedation with precedex, but arousable and able to hold conversation. No CP, SOB, fevers or chills.     Neurologic Medications  aspirin Suppository 150 milliGRAM(s) Rectal daily  fentaNYL   Patch  50 MICROgram(s)/Hr. 1 Patch Transdermal every 48 hours  HYDROmorphone  Injectable 0.5 milliGRAM(s) IV Push every 3 hours PRN Moderate Pain (4 - 6)  HYDROmorphone  Injectable 1 milliGRAM(s) IV Push every 3 hours PRN Severe Pain (7 - 10)    Respiratory Medications    Cardiovascular Medications    Gastrointestinal Medications  dextrose 5% + lactated ringers. 1000 milliLiter(s) IV Continuous <Continuous>  dextrose 5%. 1000 milliLiter(s) IV Continuous <Continuous>  dextrose 5%. 1000 milliLiter(s) IV Continuous <Continuous>  pantoprazole  Injectable 40 milliGRAM(s) IV Push two times a day    Genitourinary Medications    Hematologic/Oncologic Medications  heparin  Infusion 1200 Unit(s)/Hr IV Continuous <Continuous>  influenza  Vaccine (HIGH DOSE) 0.7 milliLiter(s) IntraMuscular once    Antimicrobial/Immunologic Medications    Endocrine/Metabolic Medications  dextrose 50% Injectable 25 Gram(s) IV Push once  dextrose 50% Injectable 12.5 Gram(s) IV Push once  dextrose 50% Injectable 25 Gram(s) IV Push once  dextrose Oral Gel 15 Gram(s) Oral once PRN Blood Glucose LESS THAN 70 milliGRAM(s)/deciliter  glucagon  Injectable 1 milliGRAM(s) IntraMuscular once  insulin lispro (ADMELOG) corrective regimen sliding scale   SubCutaneous every 6 hours    Topical/Other Medications  chlorhexidine 0.12% Liquid 15 milliLiter(s) Oral Mucosa every 12 hours  chlorhexidine 2% Cloths 1 Application(s) Topical <User Schedule>      MEDICATIONS  (PRN):  dextrose Oral Gel 15 Gram(s) Oral once PRN Blood Glucose LESS THAN 70 milliGRAM(s)/deciliter  HYDROmorphone  Injectable 0.5 milliGRAM(s) IV Push every 3 hours PRN Moderate Pain (4 - 6)  HYDROmorphone  Injectable 1 milliGRAM(s) IV Push every 3 hours PRN Severe Pain (7 - 10)      I&O's Detail    18 Dec 2022 07:01  -  19 Dec 2022 07:00  --------------------------------------------------------  IN:    Dexmedetomidine: 153.6 mL    dextrose 5% + lactated ringers: 2400 mL    Heparin: 156 mL    Heparin: 50 mL    Heparin Infusion: 50 mL    IV PiggyBack: 200 mL    IV PiggyBack: 125 mL    IV PiggyBack: 100 mL    IV PiggyBack: 250 mL  Total IN: 3484.6 mL    OUT:    Ileostomy (mL): 2540 mL    Indwelling Catheter - Urethral (mL): 1165 mL  Total OUT: 3705 mL    Total NET: -220.4 mL      19 Dec 2022 07:01  -  19 Dec 2022 10:19  --------------------------------------------------------  IN:    Dexmedetomidine: 6.4 mL    dextrose 5% + lactated ringers: 300 mL    Heparin: 36 mL  Total IN: 342.4 mL    OUT:    Ileostomy (mL): 400 mL    Indwelling Catheter - Urethral (mL): 270 mL  Total OUT: 670 mL    Total NET: -327.6 mL          Vital Signs Last 24 Hrs  T(C): 36.3 (19 Dec 2022 09:00), Max: 37.3 (18 Dec 2022 13:07)  T(F): 97.4 (19 Dec 2022 09:00), Max: 99.2 (18 Dec 2022 22:04)  HR: 102 (19 Dec 2022 10:00) (83 - 120)  BP: 112/78 (19 Dec 2022 10:00) (98/70 - 135/67)  BP(mean): 90 (19 Dec 2022 10:00) (76 - 106)  RR: 16 (19 Dec 2022 10:00) (11 - 20)  SpO2: 95% (19 Dec 2022 10:00) (95% - 100%)    Parameters below as of 19 Dec 2022 09:00  Patient On (Oxygen Delivery Method): room air        General: Cachetic elderly male, appears stated age, responsive to questions although agitated  Neuro: Grossly intact bilaterally   HEENT: Normocephalic, atraumatic, trachea midline, no JVD   Chest: Equal rise and fall of chest   Heart: Regular S1/S2, no murmurs rubs or gallops   Lungs: Unlabored breathing on room air; Clear to auscultation bilaterally, no adventitious sounds   Abdomen: Soft, non-distended, normoactive bowel sounds throughout, no tenderness to palpation in all 4 quadrants; RLQ ostomy pink with brown liquid, midline laparotomy incision is clean/dry/intact    Upper Extremities: Trace edema in hands, freely mobile bilaterally   Lower Extremities: No edema, SCDs in place on RLE; RLE warm, LLE cool with ruptured blisters from upper calf to toes   Neuro: Severe sensory and motor deficits in LLE. Absent sensation in left toes/foot  Skin: Warm except for LLE, non-diaphoretic throughout       LABS:                        9.3    13.23 )-----------( 373      ( 19 Dec 2022 05:30 )             27.8     12-19    135  |  104  |  7   ----------------------------<  97  3.7   |  24  |  0.67    Ca    8.1<L>      19 Dec 2022 05:30  Phos  2.1     12-18  Mg     1.8     12-18    TPro  5.0<L>  /  Alb  2.0<L>  /  TBili  1.5<H>  /  DBili  x   /  AST  61<H>  /  ALT  25  /  AlkPhos  92  12-18    PT/INR - ( 19 Dec 2022 05:30 )   PT: 13.7 sec;   INR: 1.15          PTT - ( 19 Dec 2022 05:30 )  PTT:63.6 sec  Urinalysis Basic - ( 17 Dec 2022 11:23 )    Color: Yellow / Appearance: Clear / SG: >=1.030 / pH: x  Gluc: x / Ketone: NEGATIVE  / Bili: Negative / Urobili: 0.2 E.U./dL   Blood: x / Protein: Trace mg/dL / Nitrite: NEGATIVE   Leuk Esterase: NEGATIVE / RBC: Many /HPF / WBC 5-10 /HPF   Sq Epi: x / Non Sq Epi: 0-5 /HPF / Bacteria: Present /HPF        RADIOLOGY & ADDITIONAL STUDIES:

## 2022-12-19 NOTE — PROGRESS NOTE ADULT - ASSESSMENT
78 y/o M with no significant PMHx found unresponsive at his nursing home, resolved after narcan in the field, and brought to [facility]. Found to have LV dysfunction (Tachy mediated), sub-segmental PE, atrial flutter, and large LV thrombus on echo. Transferred to [facility] for further management. Course complicated by acute mesenteric ischemia now s/p embolectomy and bowel resection, septic shock, LLE arterial occlusion.      #HFrEF   EF 10-15%, LVIDD 5. Severely reduced RV function.   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   GDMT: please start Toprol 25 mg qd. Eventual ARB/ARNI, MRA  Diuretics: euvolemic off loop diurestics    #Atrial Flutter   - c/w heparin gtt  - EP following, may consider DCCV prior to discharge    #Acute mesenteric ischemia and limb ischemia 2/2 LV thrombus emobolization   Now s/p Exlap Embolectomy of SMA and resection of 80cm of SB  - heparin gtt  - care per surgery/vascular

## 2022-12-19 NOTE — PROVIDER CONTACT NOTE (CHANGE IN STATUS NOTIFICATION) - SITUATION
Pt verbalized suicidal ideations. Yany BLANK made aware. Pt verbalized suicidal ideations. Yany BLANK made aware. PA at bedside in discussion with pt.

## 2022-12-20 NOTE — PROGRESS NOTE ADULT - SUBJECTIVE AND OBJECTIVE BOX
SUBJECTIVE: Pt seen and examined at bedside this am. He reports he couldn't sleep overnight because of abdominal pain. Denies any leg pain. Denies any more bloody BMs    MEDICATIONS  (STANDING):  aspirin enteric coated 81 milliGRAM(s) Oral daily  chlorhexidine 0.12% Liquid 15 milliLiter(s) Oral Mucosa every 12 hours  chlorhexidine 2% Cloths 1 Application(s) Topical <User Schedule>  dextrose 5% + lactated ringers. 1000 milliLiter(s) (125 mL/Hr) IV Continuous <Continuous>  dextrose 5%. 1000 milliLiter(s) (100 mL/Hr) IV Continuous <Continuous>  dextrose 5%. 1000 milliLiter(s) (50 mL/Hr) IV Continuous <Continuous>  dextrose 50% Injectable 25 Gram(s) IV Push once  dextrose 50% Injectable 12.5 Gram(s) IV Push once  dextrose 50% Injectable 25 Gram(s) IV Push once  fentaNYL   Patch  50 MICROgram(s)/Hr. 1 Patch Transdermal every 48 hours  glucagon  Injectable 1 milliGRAM(s) IntraMuscular once  influenza  Vaccine (HIGH DOSE) 0.7 milliLiter(s) IntraMuscular once  insulin lispro (ADMELOG) corrective regimen sliding scale   SubCutaneous every 6 hours  magnesium sulfate  IVPB 1 Gram(s) IV Intermittent once  metoprolol succinate ER 25 milliGRAM(s) Oral daily  pantoprazole  Injectable 40 milliGRAM(s) IV Push two times a day  piperacillin/tazobactam IVPB.. 4.5 Gram(s) IV Intermittent every 8 hours  potassium phosphate IVPB 15 milliMole(s) IV Intermittent once  silver sulfADIAZINE 1% Cream 1 Application(s) Topical daily  vancomycin  IVPB 1000 milliGRAM(s) IV Intermittent every 12 hours    MEDICATIONS  (PRN):  dextrose Oral Gel 15 Gram(s) Oral once PRN Blood Glucose LESS THAN 70 milliGRAM(s)/deciliter  HYDROmorphone  Injectable 0.5 milliGRAM(s) IV Push every 3 hours PRN Moderate Pain (4 - 6)  HYDROmorphone  Injectable 1 milliGRAM(s) IV Push every 3 hours PRN Severe Pain (7 - 10)      Vital Signs Last 24 Hrs  T(C): 37 (20 Dec 2022 05:48), Max: 37.1 (20 Dec 2022 01:01)  T(F): 98.6 (20 Dec 2022 05:48), Max: 98.8 (20 Dec 2022 01:01)  HR: 127 (20 Dec 2022 07:00) (99 - 133)  BP: 123/98 (20 Dec 2022 07:00) (112/78 - 151/91)  BP(mean): 107 (20 Dec 2022 07:00) (90 - 118)  RR: 18 (20 Dec 2022 07:00) (11 - 22)  SpO2: 96% (20 Dec 2022 07:00) (95% - 99%)    Parameters below as of 20 Dec 2022 07:00  Patient On (Oxygen Delivery Method): room air        Physical Exam  General: NAD, sedated in bed  Pulmonary: Nonlabored breathing, intubated  CV: NSR  Abd: soft, ileostomy pink with red rubber in place, ostomy bag with liquid output (no blood),  Extremities: (-) edema, left calf/foot cool, right calf/foot warm, well-perfused, no L DP/PT signals, R PT mono, no R DP, discoloration of the left calf, calf blisters have popped, decreased sensation in L foot and anterior portion of calf. wrapped in kerlex, toes necrotic      I&O's Detail    19 Dec 2022 07:01  -  20 Dec 2022 07:00  --------------------------------------------------------  IN:    Dexmedetomidine: 6.4 mL    dextrose 5% + lactated ringers: 2675 mL    Heparin: 120 mL    IV PiggyBack: 200 mL    IV PiggyBack: 250 mL    IV PiggyBack: 300 mL    IV PiggyBack: 200 mL    Lactated Ringers Bolus: 1000 mL  Total IN: 4751.4 mL    OUT:    Ileostomy (mL): 2395 mL    Indwelling Catheter - Urethral (mL): 270 mL    Voided (mL): 1100 mL  Total OUT: 3765 mL    Total NET: 986.4 mL      20 Dec 2022 07:01  -  20 Dec 2022 07:29  --------------------------------------------------------  IN:    dextrose 5% + lactated ringers: 125 mL  Total IN: 125 mL    OUT:  Total OUT: 0 mL    Total NET: 125 mL          LABS:                        9.5    13.74 )-----------( 400      ( 20 Dec 2022 04:58 )             28.0     12-20    134<L>  |  102  |  7   ----------------------------<  101<H>  4.0   |  25  |  0.75    Ca    8.0<L>      20 Dec 2022 04:58  Phos  2.5     12-20  Mg     1.8     12-20    TPro  5.4<L>  /  Alb  2.4<L>  /  TBili  1.0  /  DBili  x   /  AST  40  /  ALT  25  /  AlkPhos  106  12-20    PT/INR - ( 20 Dec 2022 04:58 )   PT: 14.0 sec;   INR: 1.17          PTT - ( 20 Dec 2022 04:58 )  PTT:29.1 sec      RADIOLOGY & ADDITIONAL STUDIES:   SUBJECTIVE: Pt seen and examined at bedside this am. He reports he couldn't sleep overnight because of abdominal pain. Denies any leg pain. Denies any more bloody BMs    MEDICATIONS  (STANDING):  aspirin enteric coated 81 milliGRAM(s) Oral daily  chlorhexidine 0.12% Liquid 15 milliLiter(s) Oral Mucosa every 12 hours  chlorhexidine 2% Cloths 1 Application(s) Topical <User Schedule>  dextrose 5% + lactated ringers. 1000 milliLiter(s) (125 mL/Hr) IV Continuous <Continuous>  dextrose 5%. 1000 milliLiter(s) (100 mL/Hr) IV Continuous <Continuous>  dextrose 5%. 1000 milliLiter(s) (50 mL/Hr) IV Continuous <Continuous>  dextrose 50% Injectable 25 Gram(s) IV Push once  dextrose 50% Injectable 12.5 Gram(s) IV Push once  dextrose 50% Injectable 25 Gram(s) IV Push once  fentaNYL   Patch  50 MICROgram(s)/Hr. 1 Patch Transdermal every 48 hours  glucagon  Injectable 1 milliGRAM(s) IntraMuscular once  influenza  Vaccine (HIGH DOSE) 0.7 milliLiter(s) IntraMuscular once  insulin lispro (ADMELOG) corrective regimen sliding scale   SubCutaneous every 6 hours  magnesium sulfate  IVPB 1 Gram(s) IV Intermittent once  metoprolol succinate ER 25 milliGRAM(s) Oral daily  pantoprazole  Injectable 40 milliGRAM(s) IV Push two times a day  piperacillin/tazobactam IVPB.. 4.5 Gram(s) IV Intermittent every 8 hours  potassium phosphate IVPB 15 milliMole(s) IV Intermittent once  silver sulfADIAZINE 1% Cream 1 Application(s) Topical daily  vancomycin  IVPB 1000 milliGRAM(s) IV Intermittent every 12 hours    MEDICATIONS  (PRN):  dextrose Oral Gel 15 Gram(s) Oral once PRN Blood Glucose LESS THAN 70 milliGRAM(s)/deciliter  HYDROmorphone  Injectable 0.5 milliGRAM(s) IV Push every 3 hours PRN Moderate Pain (4 - 6)  HYDROmorphone  Injectable 1 milliGRAM(s) IV Push every 3 hours PRN Severe Pain (7 - 10)      Vital Signs Last 24 Hrs  T(C): 37 (20 Dec 2022 05:48), Max: 37.1 (20 Dec 2022 01:01)  T(F): 98.6 (20 Dec 2022 05:48), Max: 98.8 (20 Dec 2022 01:01)  HR: 127 (20 Dec 2022 07:00) (99 - 133)  BP: 123/98 (20 Dec 2022 07:00) (112/78 - 151/91)  BP(mean): 107 (20 Dec 2022 07:00) (90 - 118)  RR: 18 (20 Dec 2022 07:00) (11 - 22)  SpO2: 96% (20 Dec 2022 07:00) (95% - 99%)    Parameters below as of 20 Dec 2022 07:00  Patient On (Oxygen Delivery Method): room air        Physical Exam  General: NAD,  Pulmonary: Nonlabored breathing,   CV: NSR  Abd: soft, ileostomy pink with red rubber in place, ostomy bag with liquid output (no blood),  Extremities: (-) edema, left calf/foot cool, right calf/foot warm, well-perfused, no L DP/PT signals, R PT mono, no R DP, discoloration of the left calf, calf blisters have popped, decreased sensation in L foot and anterior portion of calf. wrapped in kerlex, toes necrotic      I&O's Detail    19 Dec 2022 07:01  -  20 Dec 2022 07:00  --------------------------------------------------------  IN:    Dexmedetomidine: 6.4 mL    dextrose 5% + lactated ringers: 2675 mL    Heparin: 120 mL    IV PiggyBack: 200 mL    IV PiggyBack: 250 mL    IV PiggyBack: 300 mL    IV PiggyBack: 200 mL    Lactated Ringers Bolus: 1000 mL  Total IN: 4751.4 mL    OUT:    Ileostomy (mL): 2395 mL    Indwelling Catheter - Urethral (mL): 270 mL    Voided (mL): 1100 mL  Total OUT: 3765 mL    Total NET: 986.4 mL      20 Dec 2022 07:01  -  20 Dec 2022 07:29  --------------------------------------------------------  IN:    dextrose 5% + lactated ringers: 125 mL  Total IN: 125 mL    OUT:  Total OUT: 0 mL    Total NET: 125 mL          LABS:                        9.5    13.74 )-----------( 400      ( 20 Dec 2022 04:58 )             28.0     12-20    134<L>  |  102  |  7   ----------------------------<  101<H>  4.0   |  25  |  0.75    Ca    8.0<L>      20 Dec 2022 04:58  Phos  2.5     12-20  Mg     1.8     12-20    TPro  5.4<L>  /  Alb  2.4<L>  /  TBili  1.0  /  DBili  x   /  AST  40  /  ALT  25  /  AlkPhos  106  12-20    PT/INR - ( 20 Dec 2022 04:58 )   PT: 14.0 sec;   INR: 1.17          PTT - ( 20 Dec 2022 04:58 )  PTT:29.1 sec      RADIOLOGY & ADDITIONAL STUDIES:

## 2022-12-20 NOTE — BH CONSULTATION LIAISON ASSESSMENT NOTE - NSUNABLEASSESSPROTRISKCOMMENT_PSY_ALL_CORE
Patient is future oriented, identifies reasons for living such as his family members, his hobbies including his love for chess

## 2022-12-20 NOTE — PROGRESS NOTE ADULT - ASSESSMENT
77 year old male with no documented past medical history presenting from [facility] after being found unresponsive at his nursing home s/p narcan with resolution. Found to have atrial flutter. Echo showed severely reduced LV function with an LV thrombus. Heparin gtt was started and pt transferred to [facility] for LORENZO and possible ablation. Vascular consulted for cold leg. Bilateral Duplex (12/8) showed acute thrombus completely occluding the left popliteal artery and acute thrombus incompletely occluding the left dorsalis pedis artery and likely acute thrombus completely occluding the left mid superficial femoral artery and extending to the left popliteal artery. Left DP with absent signals. Patient noted to have acute abdominal pain overnight with bloody diarrhea c/f acute mesenteric ischemia and taken emergently to the OR. Now POD2 s/p ex lap, SMA embolectomy, and SBR (80 cm), s/p second look with additional small bowel resection (50cm) with wound left open (abthera in place) with plans for second look today. No need mesenteric angiogram today as patient is off pressors, extubated and hemodynamically stable.     Plan:   - Recommend continued hep gtt to goal of PTT    - no indication for acute vascular intervention at this time - awaiting for leg to demarcate  - Rest of care per SICU  - Vascular surgery Team 3C will continue to follow. Please call [phone] with any questions or concerns.      76 y/o M with Significant PMHx of IVDU found unresponsive at his nursing home, resolved after Narcan in the field, and brought to [facility]. Found to have PE, atrial flutter, and large LV thrombus on echo. Transferred to [facility] for further management. Pt C/o abdominal pain on 12/10 CTA showing mid SMA with embolus. Abnormal distal small bowel loops and cecum with dilatation and pneumatosis suggesting infarcted bowel. One or two tiny foci of  intrahepatic portal vein pneumatosis. Segmental infarction upper pole left kidney. Now s/p Ex lap, SMA embolectomy, 80cm SBR, abthera vac left in discontinuity (12/11) and transferred to SICU intubated. S/p second look (12/13) and most recently s/p OR for 3rd look, end-to-end anastomosis of remaining bowel, loop ileostomy and abdomen closure (12/15). Remains in SICU now extubated with acute limb ischemia to LLE pending amputation. Patient is off pressors, extubated and hemodynamically stable.       Plan:   - Recommend continued hep gtt to goal of PTT    - no indication for acute vascular intervention at this time - awaiting for leg to demarcate  - Rest of care per SICU  - Vascular surgery Team 3C will continue to follow. Please call [phone] with any questions or concerns.

## 2022-12-20 NOTE — PROGRESS NOTE ADULT - SUBJECTIVE AND OBJECTIVE BOX
INTERVAL/OVERNIGHT EVENTS:    SUBJECTIVE:     POD #  SICU Day #    Neurologic Medications  fentaNYL   Patch  50 MICROgram(s)/Hr. 1 Patch Transdermal every 48 hours  HYDROmorphone  Injectable 0.5 milliGRAM(s) IV Push every 3 hours PRN Moderate Pain (4 - 6)  HYDROmorphone  Injectable 1 milliGRAM(s) IV Push every 3 hours PRN Severe Pain (7 - 10)    Respiratory Medications    Cardiovascular Medications  metoprolol succinate ER 25 milliGRAM(s) Oral daily    Gastrointestinal Medications  dextrose 5% + lactated ringers. 1000 milliLiter(s) IV Continuous <Continuous>  dextrose 5%. 1000 milliLiter(s) IV Continuous <Continuous>  dextrose 5%. 1000 milliLiter(s) IV Continuous <Continuous>  loperamide 2 milliGRAM(s) Oral daily  pantoprazole  Injectable 40 milliGRAM(s) IV Push two times a day    Genitourinary Medications    Hematologic/Oncologic Medications  aspirin enteric coated 81 milliGRAM(s) Oral daily  influenza  Vaccine (HIGH DOSE) 0.7 milliLiter(s) IntraMuscular once    Antimicrobial/Immunologic Medications  piperacillin/tazobactam IVPB.. 4.5 Gram(s) IV Intermittent every 8 hours  vancomycin  IVPB 1000 milliGRAM(s) IV Intermittent every 12 hours    Endocrine/Metabolic Medications  dextrose 50% Injectable 25 Gram(s) IV Push once  dextrose 50% Injectable 12.5 Gram(s) IV Push once  dextrose 50% Injectable 25 Gram(s) IV Push once  dextrose Oral Gel 15 Gram(s) Oral once PRN Blood Glucose LESS THAN 70 milliGRAM(s)/deciliter  glucagon  Injectable 1 milliGRAM(s) IntraMuscular once  insulin lispro (ADMELOG) corrective regimen sliding scale   SubCutaneous every 6 hours    Topical/Other Medications  chlorhexidine 0.12% Liquid 15 milliLiter(s) Oral Mucosa every 12 hours  chlorhexidine 2% Cloths 1 Application(s) Topical <User Schedule>  silver sulfADIAZINE 1% Cream 1 Application(s) Topical daily      MEDICATIONS  (PRN):  dextrose Oral Gel 15 Gram(s) Oral once PRN Blood Glucose LESS THAN 70 milliGRAM(s)/deciliter  HYDROmorphone  Injectable 0.5 milliGRAM(s) IV Push every 3 hours PRN Moderate Pain (4 - 6)  HYDROmorphone  Injectable 1 milliGRAM(s) IV Push every 3 hours PRN Severe Pain (7 - 10)      I&O's Detail    19 Dec 2022 07:01  -  20 Dec 2022 07:00  --------------------------------------------------------  IN:    Dexmedetomidine: 6.4 mL    dextrose 5% + lactated ringers: 2675 mL    Heparin: 120 mL    IV PiggyBack: 200 mL    IV PiggyBack: 200 mL    IV PiggyBack: 250 mL    IV PiggyBack: 300 mL    Lactated Ringers Bolus: 1000 mL  Total IN: 4751.4 mL    OUT:    Ileostomy (mL): 2395 mL    Indwelling Catheter - Urethral (mL): 270 mL    Voided (mL): 1100 mL  Total OUT: 3765 mL    Total NET: 986.4 mL      20 Dec 2022 07:01  -  20 Dec 2022 14:23  --------------------------------------------------------  IN:    dextrose 5% + lactated ringers: 875 mL    IV PiggyBack: 100 mL    IV PiggyBack: 250 mL    IV PiggyBack: 100 mL  Total IN: 1325 mL    OUT:    Ileostomy (mL): 350 mL    Voided (mL): 90 mL  Total OUT: 440 mL    Total NET: 885 mL          Vital Signs Last 24 Hrs  T(C): 37 (20 Dec 2022 05:48), Max: 37.1 (20 Dec 2022 01:01)  T(F): 98.6 (20 Dec 2022 05:48), Max: 98.8 (20 Dec 2022 01:01)  HR: 126 (20 Dec 2022 13:00) (116 - 131)  BP: 117/74 (20 Dec 2022 12:00) (113/75 - 151/91)  BP(mean): 92 (20 Dec 2022 12:00) (90 - 118)  RR: 12 (20 Dec 2022 13:00) (11 - 22)  SpO2: 98% (20 Dec 2022 13:00) (95% - 99%)    Parameters below as of 20 Dec 2022 12:00  Patient On (Oxygen Delivery Method): room air      Physical Exam  General: Cachetic elderly male, appears stated age, responsive to questions although agitated  Neuro: Grossly intact bilaterally   HEENT: Normocephalic, atraumatic, trachea midline, no JVD   Chest: Equal rise and fall of chest   Heart: Regular S1/S2, no murmurs rubs or gallops   Lungs: Unlabored breathing on room air; Clear to auscultation bilaterally, no adventitious sounds   Abdomen: Soft, non-distended, normoactive bowel sounds throughout, no tenderness to palpation in all 4 quadrants; RLQ ostomy pink with brown liquid, midline laparotomy incision is clean/dry/intact    Upper Extremities: Trace edema in hands, freely mobile bilaterally   Lower Extremities: No edema, SCDs in place on RLE; RLE warm, LLE cool with ruptured blisters from upper calf to toes   Neuro: Severe sensory and motor deficits in LLE. Absent sensation in left toes/foot  Skin: Warm except for LLE, non-diaphoretic throughout       LABS:                        9.5    13.74 )-----------( 400      ( 20 Dec 2022 04:58 )             28.0     12-20    134<L>  |  102  |  7   ----------------------------<  101<H>  4.0   |  25  |  0.75    Ca    8.0<L>      20 Dec 2022 04:58  Phos  2.5     12-20  Mg     1.8     12-20    TPro  5.4<L>  /  Alb  2.4<L>  /  TBili  1.0  /  DBili  x   /  AST  40  /  ALT  25  /  AlkPhos  106  12-20    PT/INR - ( 20 Dec 2022 04:58 )   PT: 14.0 sec;   INR: 1.17          PTT - ( 20 Dec 2022 04:58 )  PTT:29.1 sec      RADIOLOGY & ADDITIONAL STUDIES:     INTERVAL/OVERNIGHT EVENTS:    SUBJECTIVE: Patient seen at bedside in no acute distress. In poor spirits but pain controlled. he denies any fevers chills, CP, SOB, or abdominal pain.      Neurologic Medications  fentaNYL   Patch  50 MICROgram(s)/Hr. 1 Patch Transdermal every 48 hours  HYDROmorphone  Injectable 0.5 milliGRAM(s) IV Push every 3 hours PRN Moderate Pain (4 - 6)  HYDROmorphone  Injectable 1 milliGRAM(s) IV Push every 3 hours PRN Severe Pain (7 - 10)    Respiratory Medications    Cardiovascular Medications  metoprolol succinate ER 25 milliGRAM(s) Oral daily    Gastrointestinal Medications  dextrose 5% + lactated ringers. 1000 milliLiter(s) IV Continuous <Continuous>  dextrose 5%. 1000 milliLiter(s) IV Continuous <Continuous>  dextrose 5%. 1000 milliLiter(s) IV Continuous <Continuous>  loperamide 2 milliGRAM(s) Oral daily  pantoprazole  Injectable 40 milliGRAM(s) IV Push two times a day    Genitourinary Medications    Hematologic/Oncologic Medications  aspirin enteric coated 81 milliGRAM(s) Oral daily  influenza  Vaccine (HIGH DOSE) 0.7 milliLiter(s) IntraMuscular once    Antimicrobial/Immunologic Medications  piperacillin/tazobactam IVPB.. 4.5 Gram(s) IV Intermittent every 8 hours  vancomycin  IVPB 1000 milliGRAM(s) IV Intermittent every 12 hours    Endocrine/Metabolic Medications  dextrose 50% Injectable 25 Gram(s) IV Push once  dextrose 50% Injectable 12.5 Gram(s) IV Push once  dextrose 50% Injectable 25 Gram(s) IV Push once  dextrose Oral Gel 15 Gram(s) Oral once PRN Blood Glucose LESS THAN 70 milliGRAM(s)/deciliter  glucagon  Injectable 1 milliGRAM(s) IntraMuscular once  insulin lispro (ADMELOG) corrective regimen sliding scale   SubCutaneous every 6 hours    Topical/Other Medications  chlorhexidine 0.12% Liquid 15 milliLiter(s) Oral Mucosa every 12 hours  chlorhexidine 2% Cloths 1 Application(s) Topical <User Schedule>  silver sulfADIAZINE 1% Cream 1 Application(s) Topical daily      MEDICATIONS  (PRN):  dextrose Oral Gel 15 Gram(s) Oral once PRN Blood Glucose LESS THAN 70 milliGRAM(s)/deciliter  HYDROmorphone  Injectable 0.5 milliGRAM(s) IV Push every 3 hours PRN Moderate Pain (4 - 6)  HYDROmorphone  Injectable 1 milliGRAM(s) IV Push every 3 hours PRN Severe Pain (7 - 10)      I&O's Detail    19 Dec 2022 07:01  -  20 Dec 2022 07:00  --------------------------------------------------------  IN:    Dexmedetomidine: 6.4 mL    dextrose 5% + lactated ringers: 2675 mL    Heparin: 120 mL    IV PiggyBack: 200 mL    IV PiggyBack: 200 mL    IV PiggyBack: 250 mL    IV PiggyBack: 300 mL    Lactated Ringers Bolus: 1000 mL  Total IN: 4751.4 mL    OUT:    Ileostomy (mL): 2395 mL    Indwelling Catheter - Urethral (mL): 270 mL    Voided (mL): 1100 mL  Total OUT: 3765 mL    Total NET: 986.4 mL      20 Dec 2022 07:01  -  20 Dec 2022 14:23  --------------------------------------------------------  IN:    dextrose 5% + lactated ringers: 875 mL    IV PiggyBack: 100 mL    IV PiggyBack: 250 mL    IV PiggyBack: 100 mL  Total IN: 1325 mL    OUT:    Ileostomy (mL): 350 mL    Voided (mL): 90 mL  Total OUT: 440 mL    Total NET: 885 mL          Vital Signs Last 24 Hrs  T(C): 37 (20 Dec 2022 05:48), Max: 37.1 (20 Dec 2022 01:01)  T(F): 98.6 (20 Dec 2022 05:48), Max: 98.8 (20 Dec 2022 01:01)  HR: 126 (20 Dec 2022 13:00) (116 - 131)  BP: 117/74 (20 Dec 2022 12:00) (113/75 - 151/91)  BP(mean): 92 (20 Dec 2022 12:00) (90 - 118)  RR: 12 (20 Dec 2022 13:00) (11 - 22)  SpO2: 98% (20 Dec 2022 13:00) (95% - 99%)    Parameters below as of 20 Dec 2022 12:00  Patient On (Oxygen Delivery Method): room air      Physical Exam  General: Cachetic elderly male, appears stated age, responsive to questions although agitated  Neuro: Grossly intact bilaterally   HEENT: Normocephalic, atraumatic, trachea midline, no JVD   Chest: Equal rise and fall of chest   Heart: Regular S1/S2, no murmurs rubs or gallops   Lungs: Unlabored breathing on room air; Clear to auscultation bilaterally, no adventitious sounds   Abdomen: Soft, non-distended, normoactive bowel sounds throughout, no tenderness to palpation in all 4 quadrants; RLQ ostomy pink with brown liquid, midline laparotomy incision is clean/dry/intact    Upper Extremities: Trace edema in hands, freely mobile bilaterally   Lower Extremities: No edema, SCDs in place on RLE; RLE warm, LLE cool with ruptured blisters from upper calf to toes   Neuro: Severe sensory and motor deficits in LLE. Absent sensation in left toes/foot  Skin: Warm except for LLE, non-diaphoretic throughout       LABS:                        9.5    13.74 )-----------( 400      ( 20 Dec 2022 04:58 )             28.0     12-20    134<L>  |  102  |  7   ----------------------------<  101<H>  4.0   |  25  |  0.75    Ca    8.0<L>      20 Dec 2022 04:58  Phos  2.5     12-20  Mg     1.8     12-20    TPro  5.4<L>  /  Alb  2.4<L>  /  TBili  1.0  /  DBili  x   /  AST  40  /  ALT  25  /  AlkPhos  106  12-20    PT/INR - ( 20 Dec 2022 04:58 )   PT: 14.0 sec;   INR: 1.17          PTT - ( 20 Dec 2022 04:58 )  PTT:29.1 sec      RADIOLOGY & ADDITIONAL STUDIES:

## 2022-12-20 NOTE — BH CONSULTATION LIAISON ASSESSMENT NOTE - SUMMARY
77 year old male with history of cocaine/heroin use with poor medical follow up, last PCP visit 20 years prior to admission, presenting with unresponsiveness from nursing home to [facility], found to be in Aflutter w RVR with subsegmental PE RUL, transferred to [facility], found to have LV thrombus and worsening HF, EF 10-15%. 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 Course further complicated by LLE pulselessness and ulcerations that will ultimately require BKA. Primary team consulted psychiatry for evaluation of passive suicidal ideation that began last night; patient found to have signs and symptoms of adjustment disorder.

## 2022-12-20 NOTE — BH CONSULTATION LIAISON ASSESSMENT NOTE - NSBHCHARTREVIEWVS_PSY_A_CORE FT
Vital Signs Last 24 Hrs  T(C): 37 (20 Dec 2022 05:48), Max: 37.1 (20 Dec 2022 01:01)  T(F): 98.6 (20 Dec 2022 05:48), Max: 98.8 (20 Dec 2022 01:01)  HR: 116 (20 Dec 2022 09:00) (102 - 133)  BP: 113/75 (20 Dec 2022 09:00) (112/78 - 151/91)  BP(mean): 90 (20 Dec 2022 09:00) (90 - 118)  RR: 18 (20 Dec 2022 09:00) (11 - 22)  SpO2: 96% (20 Dec 2022 09:00) (95% - 99%)    Parameters below as of 20 Dec 2022 09:00  Patient On (Oxygen Delivery Method): room air

## 2022-12-20 NOTE — PROGRESS NOTE ADULT - SUBJECTIVE AND OBJECTIVE BOX
ON: Pt reports wanting to die, but no active suicidal ideation/plan. Agreeable for Psych C/S in AM. No need for restraints. MN CBC Hg 9.5 and no more bloody BMs. U/S guided PIV placed. HR 120s (Afib) good BP, POCUS b/l bibasilar B lines, bl mild pleural effusions.    12/20: D/C bowen. TOV passed. Vascular rec's LLE: will need BKA vs AKA eventually, silvadene ordered for wounds . Precedex d/c'ed. Vanc/Zosyn restarted for LLE wounds. 12 PM PTT 62.8- no change, dc art line (not done); Per HF- start Toprol XL 25mg QD, Large bloody BM @1245p- ostomy w/ bilious output, STAT LA 1.8, Hgb 9.8 (9.3), coags ok, PTT therapeutic; tachy 130s/HTNsive to 170s w/o pain nor agitation- 5mg IV Metop x1; Ostomy with high output- 1L LR x1. 2nd Bloody BM. NPO. Hep gtt held (Dr. Lanier aware). Passed TOV.    SUBJECTIVE: Patient seen and examined bedside; pt c/o mild abdominal pain overnight, didn't get much rest because of this, otherwise doing well.    aspirin enteric coated 81 milliGRAM(s) Oral daily  metoprolol succinate ER 25 milliGRAM(s) Oral daily  piperacillin/tazobactam IVPB.. 4.5 Gram(s) IV Intermittent every 8 hours  vancomycin  IVPB 1000 milliGRAM(s) IV Intermittent every 12 hours      Vital Signs Last 24 Hrs  T(C): 37 (20 Dec 2022 05:48), Max: 37.1 (20 Dec 2022 01:01)  T(F): 98.6 (20 Dec 2022 05:48), Max: 98.8 (20 Dec 2022 01:01)  HR: 127 (20 Dec 2022 07:00) (99 - 133)  BP: 123/98 (20 Dec 2022 07:00) (112/78 - 151/91)  BP(mean): 107 (20 Dec 2022 07:00) (90 - 118)  RR: 18 (20 Dec 2022 07:00) (11 - 22)  SpO2: 96% (20 Dec 2022 07:00) (95% - 99%)    Parameters below as of 20 Dec 2022 07:00  Patient On (Oxygen Delivery Method): room air      I&O's Detail    19 Dec 2022 07:01  -  20 Dec 2022 07:00  --------------------------------------------------------  IN:    Dexmedetomidine: 6.4 mL    dextrose 5% + lactated ringers: 2675 mL    Heparin: 120 mL    IV PiggyBack: 200 mL    IV PiggyBack: 250 mL    IV PiggyBack: 300 mL    IV PiggyBack: 200 mL    Lactated Ringers Bolus: 1000 mL  Total IN: 4751.4 mL    OUT:    Ileostomy (mL): 2395 mL    Indwelling Catheter - Urethral (mL): 270 mL    Voided (mL): 1100 mL  Total OUT: 3765 mL    Total NET: 986.4 mL      20 Dec 2022 07:01  -  20 Dec 2022 07:23  --------------------------------------------------------  IN:    dextrose 5% + lactated ringers: 125 mL  Total IN: 125 mL    OUT:  Total OUT: 0 mL    Total NET: 125 mL      PE:    General: NAD, resting comfortably in bed  C/V: S1 s2, RRR  Pulm: Nonlabored breathing, no respiratory distress  Abd: Soft, NTND, incision site c/d/i; ostomy pink, with minimal liquid brown stool in bag  Extrem: [facility]        LABS:                        9.5    13.74 )-----------( 400      ( 20 Dec 2022 04:58 )             28.0     12-20    134<L>  |  102  |  7   ----------------------------<  101<H>  4.0   |  25  |  0.75    Ca    8.0<L>      20 Dec 2022 04:58  Phos  2.5     12-20  Mg     1.8     12-20    TPro  5.4<L>  /  Alb  2.4<L>  /  TBili  1.0  /  DBili  x   /  AST  40  /  ALT  25  /  AlkPhos  106  12-20    PT/INR - ( 20 Dec 2022 04:58 )   PT: 14.0 sec;   INR: 1.17          PTT - ( 20 Dec 2022 04:58 )  PTT:29.1 sec      RADIOLOGY & ADDITIONAL STUDIES:

## 2022-12-20 NOTE — PROGRESS NOTE ADULT - ASSESSMENT
78yo M with no significant PMH/PSx admitted to cardiology service on 12/7 in the setting of severely reduced LV function 2/2 an LV thrombus. Pt now with several days of post-prandial abdominal pain and loose BMs, initially suspected to be 2/2 opioid withdrawal, however on evening of 12/10 had a large bloody BM. 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 Vascular surgery (already following) with plan for OR. General surgery consulted for possible operative assistance in the setting of bowel ischemia. Now POD2 s/p ex lap, SMA embolectomy and small bowel resection. Plan to RTOR today for second look.    Recommendations:    - CLD  - Appreciate vascular recs  - F/u cardiology recs  - Monitor UOP  - Rest of care per SICU    - Team 4 will continue to follow

## 2022-12-20 NOTE — PROGRESS NOTE ADULT - SUBJECTIVE AND OBJECTIVE BOX
Subjective:    no acute overnight events  continued ot be in flutter @125bpm  no palpitations, chest discomfort or dyspnea       Medications:  aspirin enteric coated 81 milliGRAM(s) Oral daily  chlorhexidine 0.12% Liquid 15 milliLiter(s) Oral Mucosa every 12 hours  chlorhexidine 2% Cloths 1 Application(s) Topical <User Schedule>  dextrose 5% + lactated ringers. 1000 milliLiter(s) IV Continuous <Continuous>  dextrose 5%. 1000 milliLiter(s) IV Continuous <Continuous>  dextrose 5%. 1000 milliLiter(s) IV Continuous <Continuous>  dextrose 50% Injectable 25 Gram(s) IV Push once  dextrose 50% Injectable 12.5 Gram(s) IV Push once  dextrose 50% Injectable 25 Gram(s) IV Push once  dextrose Oral Gel 15 Gram(s) Oral once PRN  fentaNYL   Patch  50 MICROgram(s)/Hr. 1 Patch Transdermal every 48 hours  glucagon  Injectable 1 milliGRAM(s) IntraMuscular once  heparin  Infusion 1200 Unit(s)/Hr IV Continuous <Continuous>  HYDROmorphone  Injectable 0.5 milliGRAM(s) IV Push every 3 hours PRN  HYDROmorphone  Injectable 1 milliGRAM(s) IV Push every 3 hours PRN  influenza  Vaccine (HIGH DOSE) 0.7 milliLiter(s) IntraMuscular once  insulin lispro (ADMELOG) corrective regimen sliding scale   SubCutaneous every 6 hours  loperamide 2 milliGRAM(s) Oral daily  metoprolol succinate ER 25 milliGRAM(s) Oral daily  pantoprazole  Injectable 40 milliGRAM(s) IV Push two times a day  piperacillin/tazobactam IVPB.. 4.5 Gram(s) IV Intermittent every 8 hours  silver sulfADIAZINE 1% Cream 1 Application(s) Topical daily  vancomycin  IVPB 1000 milliGRAM(s) IV Intermittent every 12 hours      Physical Exam:    Vitals:  Vital Signs Last 24 Hours  T(C): 37 (12-20-22 @ 05:48), Max: 37.1 (12-20-22 @ 01:01)  HR: 126 (12-20-22 @ 16:00) (114 - 131)  BP: 141/89 (12-20-22 @ 16:00) (113/75 - 151/91)  RR: 23 (12-20-22 @ 16:00) (11 - 23)  SpO2: 96% (12-20-22 @ 16:00) (95% - 99%)        I&O's Summary    19 Dec 2022 07:01  -  20 Dec 2022 07:00  --------------------------------------------------------  IN: 4751.4 mL / OUT: 3765 mL / NET: 986.4 mL    20 Dec 2022 07:01  -  20 Dec 2022 17:00  --------------------------------------------------------  IN: 1986 mL / OUT: 890 mL / NET: 1096 mL        Tele:    NAD AAOx3  neck supple no JVD  Heart tachy, regular, s1s2 no m   Lungs CTAB  Abd soft NTND  Ext left cool, right warm, distal pulses thready, no edema    Labs:                        9.5    13.74 )-----------( 400      ( 20 Dec 2022 04:58 )             28.0     12-20    134<L>  |  102  |  7   ----------------------------<  101<H>  4.0   |  25  |  0.75    Ca    8.0<L>      20 Dec 2022 04:58  Phos  2.5     12-20  Mg     1.8     12-20    TPro  5.4<L>  /  Alb  2.4<L>  /  TBili  1.0  /  DBili  x   /  AST  40  /  ALT  25  /  AlkPhos  106  12-20    PT/INR - ( 20 Dec 2022 04:58 )   PT: 14.0 sec;   INR: 1.17          PTT - ( 20 Dec 2022 04:58 )  PTT:29.1 sec  CARDIAC MARKERS ( 20 Dec 2022 04:58 )  x     / x     / 948 U/L / x     / x      CARDIAC MARKERS ( 19 Dec 2022 05:30 )  x     / x     / 1143 U/L / x     / x          Creatine Kinase, Serum: 948 U/L (12-20-22 @ 04:58)  Creatine Kinase, Serum: 1143 U/L (12-19-22 @ 05:30)  Creatine Kinase, Serum: 1525 U/L (12-18-22 @ 10:23)  Creatine Kinase, Serum: 948 U/L (12-20-22 @ 04:58)  Creatine Kinase, Serum: 1143 U/L (12-19-22 @ 05:30)  Creatine Kinase, Serum: 1525 U/L (12-18-22 @ 10:23)        Lactate, Blood: 1.8 mmol/L (12-19 @ 13:22)

## 2022-12-20 NOTE — PROGRESS NOTE ADULT - ATTENDING COMMENTS
A Flutter, LV thrombus, CHF - cardiology following, appreciate recs  Acute mesenteric ischemia - s/p ex-lap, SBR, SMA embolectomy, temp closure, s/p re-exploration, SBR, temp closure, s/p re-exploration SB anastomosis with diverting loop ileostomy, having ostomy function, ok for CLD  High ostomy output - ok to start imodium, monitor output, rehydrate as needed  GI bleed - hep gtt held, monitor HgB, will need to resume when able  Limb ischemia - vascular surgery following, will likely need amputation, awaiting demarcation  Also appreciate psych evaluation

## 2022-12-20 NOTE — PROGRESS NOTE ADULT - ASSESSMENT
Assessment: 76 y/o M with Significant PMHx of IVDU found unresponsive at his nursing home, resolved after Narcan in the field, and brought to [facility]. Found to have PE, atrial flutter, and large LV thrombus on echo. Transferred to [facility] for further management. Pt C/o abdominal pain on 12/10 CTA showing mid SMA with embolus. Abnormal distal small bowel loops and cecum with dilatation and pneumatosis suggesting infarcted bowel. One or two tiny foci of  intrahepatic portal vein pneumatosis. Segmental infarction upper pole left kidney. Now s/p Ex lap, SMA embolectomy, 80cm SBR, abthera vac left in discontinuity (12/11) and transferred to SICU intubated. S/p second look (12/13) and most recently s/p OR for 3rd look, end-to-end anastomosis of remaining bowel, loop ileostomy and abdomen closure (12/15). Remains in SICU now extubated with acute limb ischemia to LLE pending amputation and AC held given BRBPR.     Neuro: Pain: Fentanyl patch 50mcg q48h; IV Tylenol ATC, PRN Dilaudid.   Psych: Agitation improved--now off Precdex. Psych c/s: No add rec's.   CV: Septic shock--resolved; AFlutter s/p Amio gtt and Digoxin, now on Toprol XL 25; TTE (12/7) CHF- Severe RV dysfxn and LVEF 15%, LV thrombus. LLE limb ischemia- Heparin gtt (goal PTT 60-90) held given BRBPR, ASA 81.   Pulm: Extubated to  on 12/17, IS  GI: CLD, D5LR @125/hr, PPI. Ileostomy high output--improved.   : Voids- Infarction of upper pole of Left Kidney, Renal US negative on 12/12.   ID: LLE wounds Vanc (12-19--), Zosyn (12/19--), Ischemic bowel: Zosyn (12/11-12/18)    Heme: Hep C positive; Active T&S  Endo: mISS  PPx: SCD, Heparin gtt (PTT goal 60-90) held given bloody BMs.  Lines:PIVs D/c: Rt TLC (12/11-12/17), L radial connie (12/11-12/20),   Wounds: abdomen closed, RLQ loop ileostomy, LLE showing signs of demarcation and ruptured blisters --Silvadene.   PT/OT: Ordered 12/17  Dispo: SICU    Assessment: 76 y/o M with Significant PMHx of IVDU found unresponsive at his nursing home, resolved after Narcan in the field, and brought to [facility]. Found to have PE, atrial flutter, and large LV thrombus on echo. Transferred to [facility] for further management. Pt C/o abdominal pain on 12/10 CTA showing mid SMA with embolus. Abnormal distal small bowel loops and cecum with dilatation and pneumatosis suggesting infarcted bowel. One or two tiny foci of  intrahepatic portal vein pneumatosis. Segmental infarction upper pole left kidney. Now s/p Ex lap, SMA embolectomy, 80cm SBR, abthera vac left in discontinuity (12/11) and transferred to SICU intubated. S/p second look (12/13) and most recently s/p OR for 3rd look, end-to-end anastomosis of remaining bowel, loop ileostomy and abdomen closure (12/15). Remains in SICU now extubated with acute limb ischemia to LLE pending amputation and AC held given BRBPR.     Neuro: Pain: Fentanyl patch 50mcg q48h; IV Tylenol ATC, PRN Dilaudid.   Psych: Agitation improved--now off Precdex. Psych c/s: No add rec's.   CV: Septic shock--resolved; AFlutter s/p Amio gtt and Digoxin, now on Toprol XL 25; TTE (12/7) CHF- Severe RV dysfxn and LVEF 15%, LV thrombus. LLE limb ischemia- Heparin gtt (goal PTT 60-90) held given BRBPR, ASA 81.   Pulm: Extubated to  on 12/17, IS  GI: CLD, D5LR @125/hr, PPI. Ileostomy high output--improved.   : Voids- Infarction of upper pole of Left Kidney, Renal US negative on 12/12.   ID: LLE wounds Vanc (12-19--), Zosyn (12/19--), Ischemic bowel: Zosyn (12/11-12/18)    Heme: Hep C positive; Active T&S  Endo: mISS  PPx: SCD, Heparin gtt (PTT goal 60-90) resumed today   Lines:PIVs D/c: Rt TLC (12/11-12/17), L radial connie (12/11-12/20),   Wounds: abdomen closed, RLQ loop ileostomy, LLE showing signs of demarcation and ruptured blisters --Silvadene.   PT/OT: Ordered 12/17  Dispo: SICU

## 2022-12-20 NOTE — PROGRESS NOTE ADULT - ASSESSMENT
78 y/o M with no significant PMHx found unresponsive at his nursing home, resolved after narcan in the field, and brought to [facility]. Found to have LV dysfunction (Tachy mediated), sub-segmental PE, atrial flutter, and large LV thrombus on echo. Transferred to [facility] for further management. Course complicated by acute mesenteric ischemia now s/p embolectomy and bowel resection, septic shock, LLE arterial occlusion.      #HFrEF   EF 10-15%, LVIDD 5. Severely reduced RV function.   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

## 2022-12-20 NOTE — BH CONSULTATION LIAISON ASSESSMENT NOTE - HPI (INCLUDE ILLNESS QUALITY, SEVERITY, DURATION, TIMING, CONTEXT, MODIFYING FACTORS, ASSOCIATED SIGNS AND SYMPTOMS)
IN PROGRESS 77 year old male with history of substance use and no significant past medical history (poor medical follow up, last PCP visit 20 years prior to admission), presenting with unresponsiveness from nursing home to [facility], found to be in Aflutter w RVR with subsegmental PE RUL, transferred to [facility], found to have LV thrombus and worsening HF, EF 10-15%. 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 Course further complicated by LLE pulselessness and ulcerations that will ultimately require BKA. Primary team consulted psychiatry for evaluation of passive suicidal ideation that began last night.    On interview at bedside, patient states that he was walking around in normal health two weeks ago prior to all that has happened. He admits to feelings of hopelessness at his current situation and the fact that he will need his leg removed. 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 He admits to thoughts of ending his life, stating, "if I had a gun I would shoot myself but I don't have a gun." When asked what prevents him from committing suicide, he states "common sense." He admits that he would not likely be able to go through with ending his life if he had the means. Patient denies AV, HI. Patient smoked 3 cigarettes/day for many years until he started living in the nursing home, never drank alcohol. Admits to snorting cocaine and heroin as recently as this year since he started living in the nursing home.    He has played [employer] for many years and states he is "respected by my peers" in the game. He has a sister, son and daughter with whom he is close, but he had an argument over the phone with his daughter prior to hospital admission since which they have not spoken. Patient has never had psychiatric diagnoses or been on psychotropic medications.

## 2022-12-20 NOTE — BH CONSULTATION LIAISON ASSESSMENT NOTE - CURRENT MEDICATION
MEDICATIONS  (STANDING):  aspirin enteric coated 81 milliGRAM(s) Oral daily  chlorhexidine 0.12% Liquid 15 milliLiter(s) Oral Mucosa every 12 hours  chlorhexidine 2% Cloths 1 Application(s) Topical <User Schedule>  dextrose 5% + lactated ringers. 1000 milliLiter(s) (125 mL/Hr) IV Continuous <Continuous>  dextrose 5%. 1000 milliLiter(s) (100 mL/Hr) IV Continuous <Continuous>  dextrose 5%. 1000 milliLiter(s) (50 mL/Hr) IV Continuous <Continuous>  dextrose 50% Injectable 25 Gram(s) IV Push once  dextrose 50% Injectable 12.5 Gram(s) IV Push once  dextrose 50% Injectable 25 Gram(s) IV Push once  fentaNYL   Patch  50 MICROgram(s)/Hr. 1 Patch Transdermal every 48 hours  glucagon  Injectable 1 milliGRAM(s) IntraMuscular once  influenza  Vaccine (HIGH DOSE) 0.7 milliLiter(s) IntraMuscular once  insulin lispro (ADMELOG) corrective regimen sliding scale   SubCutaneous every 6 hours  loperamide 2 milliGRAM(s) Oral daily  metoprolol succinate ER 25 milliGRAM(s) Oral daily  pantoprazole  Injectable 40 milliGRAM(s) IV Push two times a day  piperacillin/tazobactam IVPB.. 4.5 Gram(s) IV Intermittent every 8 hours  silver sulfADIAZINE 1% Cream 1 Application(s) Topical daily  vancomycin  IVPB 1000 milliGRAM(s) IV Intermittent every 12 hours    MEDICATIONS  (PRN):  dextrose Oral Gel 15 Gram(s) Oral once PRN Blood Glucose LESS THAN 70 milliGRAM(s)/deciliter  HYDROmorphone  Injectable 0.5 milliGRAM(s) IV Push every 3 hours PRN Moderate Pain (4 - 6)  HYDROmorphone  Injectable 1 milliGRAM(s) IV Push every 3 hours PRN Severe Pain (7 - 10)

## 2022-12-20 NOTE — BH CONSULTATION LIAISON ASSESSMENT NOTE - RISK ASSESSMENT
Patient with risks for adjustment disorder given extensive present hospital course with surgeries and eventual amputation.     Patient deemed a low acute risk of suicide given fear and moral opposition of death, stating he is a "good person" and would not do something like that based on "common sense." While patient did make a comment about shooting himself if he did have a gun, upon reflection patient admitted that he would not likely be able to go through with it. Protective factors include family members, friends and loved hobbies such as chess. Chronic suicide risk factors include history of insufflated cocaine and heroin use; last use was months prior to admission but when questioned as to whether he would like to quit patient states "I already quit so I don't need to stop." Patient with no history of suicide ideation or attempts, no psychiatric diagnoses, medications, or admissions.

## 2022-12-20 NOTE — CHART NOTE - NSCHARTNOTEFT_GEN_A_CORE
Admitting Diagnosis:   Patient is a 77y old  Male who presents with a chief complaint of A flutter (20 Dec 2022 07:26)      PAST MEDICAL & SURGICAL HISTORY:      Current Nutrition Order:  Diet, Clear Liquid    PO Intake: Good (%) [   ]  Fair (50-75%) [   ] Poor (<25%) [   ]--Please See Below     GI Issues:   Ostomy: 350ml , 3000ml+    Imodium now ordered      Pain:  ABD Pain noted     Skin Integrity:  Buddy 10  +Edema: 2+(left arm; right arm; right hand; left hand), 3+(right leg; left leg)     Labs:       134<L>  |  102  |  7   ----------------------------<  101<H>  4.0   |  25  |  0.75    Ca    8.0<L>      20 Dec 2022 04:58  Phos  2.5     12-  Mg     1.8     12-    TPro  5.4<L>  /  Alb  2.4<L>  /  TBili  1.0  /  DBili  x   /  AST  40  /  ALT  25  /  AlkPhos  106  12-20    CAPILLARY BLOOD GLUCOSE      POCT Blood Glucose.: 93 mg/dL (20 Dec 2022 10:49)  POCT Blood Glucose.: 109 mg/dL (20 Dec 2022 04:41)  POCT Blood Glucose.: 108 mg/dL (19 Dec 2022 23:37)  POCT Blood Glucose.: 80 mg/dL (19 Dec 2022 17:32)      Medications:  MEDICATIONS  (STANDING):  aspirin enteric coated 81 milliGRAM(s) Oral daily  chlorhexidine 0.12% Liquid 15 milliLiter(s) Oral Mucosa every 12 hours  chlorhexidine 2% Cloths 1 Application(s) Topical <User Schedule>  dextrose 5% + lactated ringers. 1000 milliLiter(s) (125 mL/Hr) IV Continuous <Continuous>  dextrose 5%. 1000 milliLiter(s) (100 mL/Hr) IV Continuous <Continuous>  dextrose 5%. 1000 milliLiter(s) (50 mL/Hr) IV Continuous <Continuous>  dextrose 50% Injectable 25 Gram(s) IV Push once  dextrose 50% Injectable 12.5 Gram(s) IV Push once  dextrose 50% Injectable 25 Gram(s) IV Push once  fentaNYL   Patch  50 MICROgram(s)/Hr. 1 Patch Transdermal every 48 hours  glucagon  Injectable 1 milliGRAM(s) IntraMuscular once  influenza  Vaccine (HIGH DOSE) 0.7 milliLiter(s) IntraMuscular once  insulin lispro (ADMELOG) corrective regimen sliding scale   SubCutaneous every 6 hours  loperamide 2 milliGRAM(s) Oral daily  metoprolol succinate ER 25 milliGRAM(s) Oral daily  pantoprazole  Injectable 40 milliGRAM(s) IV Push two times a day  piperacillin/tazobactam IVPB.. 4.5 Gram(s) IV Intermittent every 8 hours  silver sulfADIAZINE 1% Cream 1 Application(s) Topical daily  vancomycin  IVPB 1000 milliGRAM(s) IV Intermittent every 12 hours    MEDICATIONS  (PRN):  dextrose Oral Gel 15 Gram(s) Oral once PRN Blood Glucose LESS THAN 70 milliGRAM(s)/deciliter  HYDROmorphone  Injectable 0.5 milliGRAM(s) IV Push every 3 hours PRN Moderate Pain (4 - 6)  HYDROmorphone  Injectable 1 milliGRAM(s) IV Push every 3 hours PRN Severe Pain (7 - 10)          6'6''  pounds +-10%  Wt 142 pounds BMI 16.4 %IBW=66%     Weight Change:   *No new EMR wts    Bedscale wt 136 pounds     **PCM:  >> Reports having 1-2 meals/day + ONS. Per pt claims to have 2 ensure/day and 2 nutrament/day - unclear how accurate this is. ?If pt meeting ~75% EER consistently.  >> Does report wt loss.  pounds x6 months ago. Thinks he now is 135 pounds which is consistent with bedscale wt obtained during initial visit by  pounds. Suggest wt loss of 49 pounds/26% body wt loss.  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     Estimated energy needs:  Current body wt for EER based on clinician judgment   Adjust for age, malnutrition, EF, S/p OR; fluids per team  25-30kcal/k-1950kcal/day   1.2-1.4gm/k-91gm prot/day     Subjective:  78 y/o M with Significant PMHx of IVDU found unresponsive at his nursing home, resolved after Narcan in the field, and brought to [facility]. Found to have PE, atrial flutter, and large LV thrombus on echo. Transferred to [facility] for further management. Pt C/o abdominal pain on 12/10 CTA showing mid SMA with embolus. Abnormal distal small bowel loops and cecum with dilatation and pneumatosis suggesting infarcted bowel. One or two tiny foci of intrahepatic portal vein pneumatosis. Segmental infarction upper pole left kidney. Now s/p Exlap Embolectomy of SMA and resection of 80cm of SB pt left in discontinuity and transferred to SICU intubated . S/p Exploratory laparotomy with small bowel resection . S/p Small bowel resection with anastomosis, Closure of fascia of abdomen 12/15.     Pt remains on 5EAST, Under SICU team however vascular surgery following for Limb ischemia as pt will likely need amputation. Ordered for D5% + LR. Spoke with RN as pt had been other members of team this AM. Had been NPO, diet just adv to clears, pending aubrey. Of note, pt appears to have been with inadequate diet (NPO/clears) since , ~9 days. Should PO diet remain unable to be adv, pt to benefit from alternate means of nutrition give malnutrition Dx.   Please see below for nutritions recommendations. D/w team.    Prior PES: Malnutrition Severe in Chronic state RT presumed intake<EER AEB NFPE, wt loss, PO intake  >>on going  Goal: Pt will meet at least 75% of nutrient needs via feasible route / Show no further s/s of malnutrition    Recommendations:  1. As medically feasible cont to adv diet: clears -> full liquids -> soft low fiber, low sodium diet + ensure MAX x3/day 150kcal/30gm prot per 1 can.  2. Monitor diet tolerance, %PO intake.  3. In the event diet unable to be adv, would strongly consider use of alternate means of nutrition given prolonged inadequate diet (NPO/clears) since , ~9 days, in malnurosihed pt. Please reocnsult For Recs PRN.  4. Labs: monitor BMP, CBC, glucose, lytes, trend renal indices, LFTs.  5. Pain/GI per team. Monitor Skin, Wt, GOC.  6. RD to remain available for additional nutrition interventions as needed.     Education: NA    Risk Level: High [X   ] Moderate [   ] Low [   ].

## 2022-12-20 NOTE — BH CONSULTATION LIAISON ASSESSMENT NOTE - NSBHATTESTCOMMENTATTENDFT_PSY_A_CORE
Patient is a 77 year old male with history of substance use and no significant past medical history (poor medical follow up, last PCP visit 20 years prior to admission), presenting with unresponsiveness from nursing home to [facility], found to be in Aflutter w RVR with subsegmental PE RUL, transferred to [facility], found to have LV thrombus and worsening HF, EF 10-15%. 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 Course further complicated by LLE pulselessness and ulcerations that will ultimately require BKA. Psychiatry was consulted to evaluate for passive SI. Patient currently reports low mood in context of coping with her current medical disorders, pain and anticipation to loss of independence after BKA. He denies active SI and presents future oriented thinking. He is in agreement to see psychology for support while admitted to [facility]. Plan:- no need for 1:1 observation; -trazodone 50mg po qhs prn insomnia; -psychology follow up for improvement in coping and processing a recent interpersonal conflicts with family members;

## 2022-12-21 NOTE — PROGRESS NOTE ADULT - SUBJECTIVE AND OBJECTIVE BOX
ON: PTT 9pm 63, PTT 3am 66, Vanc Tr 3 am 7.1, increased to 1250cc q12. KPhos repleted.D10 @30/hr  12/20: called Psych: No additional recs. Patient states opposition to death/suicide. Started Imodium. Advanced to CLD, with enlive. Transfer to CCU at some point. Restarted Heparin repeat PTT @2100 Lizette d/c'd. Palliative consulted. Vascular surgery wants to wait for further demarcation of ischemic limb. Per HF, add spironolactone, valsartan, in crease metoprolol to 50      SUBJECTIVE: Patient seen and examined bedside; no events overnight, HD stable, no complaints.    aspirin enteric coated 81 milliGRAM(s) Oral daily  heparin  Infusion 1300 Unit(s)/Hr IV Continuous <Continuous>  metoprolol succinate ER 50 milliGRAM(s) Oral daily  piperacillin/tazobactam IVPB.. 4.5 Gram(s) IV Intermittent every 8 hours  spironolactone 25 milliGRAM(s) Oral daily  valsartan 20 milliGRAM(s) Oral daily  vancomycin  IVPB 1250 milliGRAM(s) IV Intermittent every 12 hours      Vital Signs Last 24 Hrs  T(C): 35.9 (21 Dec 2022 09:00), Max: 37.2 (21 Dec 2022 05:20)  T(F): 96.7 (21 Dec 2022 09:00), Max: 98.9 (21 Dec 2022 05:20)  HR: 129 (21 Dec 2022 14:00) (107 - 130)  BP: 167/67 (21 Dec 2022 14:00) (108/71 - 167/67)  BP(mean): 94 (21 Dec 2022 14:00) (83 - 111)  RR: 14 (21 Dec 2022 14:00) (10 - 26)  SpO2: 98% (21 Dec 2022 14:00) (91% - 98%)    Parameters below as of 21 Dec 2022 14:00  Patient On (Oxygen Delivery Method): room air      I&O's Detail    20 Dec 2022 07:01  -  21 Dec 2022 07:00  --------------------------------------------------------  IN:    dextrose 5% + lactated ringers: 3000 mL    Heparin: 204 mL    IV PiggyBack: 100 mL    IV PiggyBack: 200 mL    IV PiggyBack: 562.5 mL  Total IN: 4066.5 mL    OUT:    Ileostomy (mL): 1400 mL    Voided (mL): 2215 mL  Total OUT: 3615 mL    Total NET: 451.5 mL      21 Dec 2022 07:01  -  21 Dec 2022 15:23  --------------------------------------------------------  IN:    dextrose 10%: 200 mL    dextrose 5% + lactated ringers: 375 mL    Heparin: 52 mL    Heparin: 36 mL    IV PiggyBack: 187.5 mL    IV PiggyBack: 100 mL  Total IN: 950.5 mL    OUT:    Ileostomy (mL): 875 mL    Voided (mL): 1395 mL  Total OUT: 2270 mL    Total NET: -1319.5 mL      PE:    General: NAD, resting comfortably in bed  C/V: S1 s2, RRR  Pulm: Nonlabored breathing, no respiratory distress  Abd: Soft, NTND, incision site c/d/il ostomy pink, brown liquid stool in ostomy bag  Extrem: [facility]          LABS:                        10.1   13.00 )-----------( 403      ( 21 Dec 2022 02:46 )             31.1     12-21    137  |  103  |  6<L>  ----------------------------<  85  3.9   |  25  |  0.75    Ca    8.1<L>      21 Dec 2022 02:46  Phos  2.8     12-21  Mg     2.1     12-21    TPro  5.4<L>  /  Alb  2.4<L>  /  TBili  1.0  /  DBili  x   /  AST  40  /  ALT  25  /  AlkPhos  106  12-20    PT/INR - ( 20 Dec 2022 04:58 )   PT: 14.0 sec;   INR: 1.17          PTT - ( 21 Dec 2022 08:24 )  PTT:55.7 sec      RADIOLOGY & ADDITIONAL STUDIES:

## 2022-12-21 NOTE — PROGRESS NOTE ADULT - SUBJECTIVE AND OBJECTIVE BOX
Patient seen and examined at bedside.    Overnight Events:     Review Of Systems: No chest pain, shortness of breath, or palpitations            Current Meds:  aspirin enteric coated 81 milliGRAM(s) Oral daily  chlorhexidine 0.12% Liquid 15 milliLiter(s) Oral Mucosa every 12 hours  chlorhexidine 2% Cloths 1 Application(s) Topical <User Schedule>  dextrose 10%. 1000 milliLiter(s) IV Continuous <Continuous>  dextrose 5%. 1000 milliLiter(s) IV Continuous <Continuous>  dextrose 5%. 1000 milliLiter(s) IV Continuous <Continuous>  dextrose 50% Injectable 25 Gram(s) IV Push once  dextrose 50% Injectable 12.5 Gram(s) IV Push once  dextrose 50% Injectable 25 Gram(s) IV Push once  dextrose Oral Gel 15 Gram(s) Oral once PRN  fentaNYL   Patch  50 MICROgram(s)/Hr. 1 Patch Transdermal every 48 hours  glucagon  Injectable 1 milliGRAM(s) IntraMuscular once  heparin  Infusion 1300 Unit(s)/Hr IV Continuous <Continuous>  HYDROmorphone  Injectable 0.5 milliGRAM(s) IV Push every 3 hours PRN  HYDROmorphone  Injectable 1 milliGRAM(s) IV Push every 3 hours PRN  influenza  Vaccine (HIGH DOSE) 0.7 milliLiter(s) IntraMuscular once  insulin lispro (ADMELOG) corrective regimen sliding scale   SubCutaneous every 6 hours  loperamide 2 milliGRAM(s) Oral daily  metoprolol succinate ER 50 milliGRAM(s) Oral daily  pantoprazole  Injectable 40 milliGRAM(s) IV Push two times a day  piperacillin/tazobactam IVPB.. 4.5 Gram(s) IV Intermittent every 8 hours  silver sulfADIAZINE 1% Cream 1 Application(s) Topical daily  spironolactone 25 milliGRAM(s) Oral daily  valsartan 20 milliGRAM(s) Oral daily  vancomycin  IVPB 1250 milliGRAM(s) IV Intermittent every 12 hours      Vitals:  T(F): 96.7 (12-21), Max: 98.9 (12-21)  HR: 123 (12-21) (107 - 130)  BP: 118/61 (12-21) (108/71 - 166/86)  RR: 12 (12-21)  SpO2: 97% (12-21)  I&O's Summary    20 Dec 2022 07:01  -  21 Dec 2022 07:00  --------------------------------------------------------  IN: 4066.5 mL / OUT: 3615 mL / NET: 451.5 mL    21 Dec 2022 07:01  -  21 Dec 2022 13:50  --------------------------------------------------------  IN: 774.5 mL / OUT: 1595 mL / NET: -820.5 mL        Physical Exam:  Appearance: No acute distress; well appearing  Eyes: PERRL, EOMI, pink conjunctiva  HEENT: Normal oral mucosa  Cardiovascular: RRR, S1, S2, no murmurs, rubs, or gallops; no edema; no JVD  Respiratory: Clear to auscultation bilaterally  Gastrointestinal: soft, non-tender, non-distended with normal bowel sounds  Musculoskeletal: No clubbing; no joint deformity   Neurologic: Non-focal  Lymphatic: No lymphadenopathy  Psychiatry: AAOx3, mood & affect appropriate  Skin: No rashes, ecchymoses, or cyanosis                          10.1   13.00 )-----------( 403      ( 21 Dec 2022 02:46 )             31.1     12-21    137  |  103  |  6<L>  ----------------------------<  85  3.9   |  25  |  0.75    Ca    8.1<L>      21 Dec 2022 02:46  Phos  2.8     12-21  Mg     2.1     12-21    TPro  5.4<L>  /  Alb  2.4<L>  /  TBili  1.0  /  DBili  x   /  AST  40  /  ALT  25  /  AlkPhos  106  12-20    PT/INR - ( 20 Dec 2022 04:58 )   PT: 14.0 sec;   INR: 1.17          PTT - ( 21 Dec 2022 08:24 )  PTT:55.7 sec  CARDIAC MARKERS ( 20 Dec 2022 04:58 )  x     / x     / x     / 948 U/L / x     / x      CARDIAC MARKERS ( 19 Dec 2022 05:30 )  x     / x     / x     / 1143 U/L / x     / x      CARDIAC MARKERS ( 18 Dec 2022 10:23 )  x     / x     / x     / 1525 U/L / x     / 9.1 ng/mL  CARDIAC MARKERS ( 18 Dec 2022 05:47 )  x     / x     / 0.04 ng/mL / 1809 U/L / x     / x      CARDIAC MARKERS ( 17 Dec 2022 20:36 )  x     / x     / 0.04 ng/mL / 1834 U/L / x     / 13.4 ng/mL            Interpretation of Telemetry:  Atrial flutter varying rates 100-120s

## 2022-12-21 NOTE — PROGRESS NOTE ADULT - SUBJECTIVE AND OBJECTIVE BOX
Subjective:    no acute voernigth events. had some mild hematochezia , Hb stable.   tolerating valsartan well.   no chest pain/pressure dyspnea  HR improved with increased BB    Medications:  aspirin enteric coated 81 milliGRAM(s) Oral daily  chlorhexidine 2% Cloths 1 Application(s) Topical <User Schedule>  dextrose 10%. 1000 milliLiter(s) IV Continuous <Continuous>  dextrose 5%. 1000 milliLiter(s) IV Continuous <Continuous>  dextrose 5%. 1000 milliLiter(s) IV Continuous <Continuous>  dextrose 50% Injectable 25 Gram(s) IV Push once  dextrose 50% Injectable 12.5 Gram(s) IV Push once  dextrose 50% Injectable 25 Gram(s) IV Push once  dextrose Oral Gel 15 Gram(s) Oral once PRN  fentaNYL   Patch  50 MICROgram(s)/Hr. 1 Patch Transdermal every 48 hours  glucagon  Injectable 1 milliGRAM(s) IntraMuscular once  heparin  Infusion 1300 Unit(s)/Hr IV Continuous <Continuous>  HYDROmorphone  Injectable 0.5 milliGRAM(s) IV Push every 3 hours PRN  HYDROmorphone  Injectable 1 milliGRAM(s) IV Push every 3 hours PRN  influenza  Vaccine (HIGH DOSE) 0.7 milliLiter(s) IntraMuscular once  insulin lispro (ADMELOG) corrective regimen sliding scale   SubCutaneous every 6 hours  loperamide 2 milliGRAM(s) Oral daily  metoprolol succinate ER 50 milliGRAM(s) Oral daily  pantoprazole  Injectable 40 milliGRAM(s) IV Push two times a day  silver sulfADIAZINE 1% Cream 1 Application(s) Topical daily  spironolactone 25 milliGRAM(s) Oral daily  valsartan 20 milliGRAM(s) Oral daily      Physical Exam:    Vitals:  Vital Signs Last 24 Hours  T(C): 35.9 (12-21-22 @ 09:00), Max: 37.2 (12-21-22 @ 05:20)  HR: 117 (12-21-22 @ 15:00) (107 - 130)  BP: 118/67 (12-21-22 @ 15:00) (108/71 - 167/67)  RR: 12 (12-21-22 @ 15:00) (10 - 26)  SpO2: 100% (12-21-22 @ 15:00) (91% - 100%)        I&O's Summary    20 Dec 2022 07:01  -  21 Dec 2022 07:00  --------------------------------------------------------  IN: 4066.5 mL / OUT: 3615 mL / NET: 451.5 mL    21 Dec 2022 07:01  -  21 Dec 2022 16:00  --------------------------------------------------------  IN: 1076.5 mL / OUT: 2645 mL / NET: -1568.5 mL        Tele:    NAD AAOx3  neck supple no JVD  Heart tachy, regular, s1s2 no m   Lungs CTAB  Abd soft NTND  Ext left cool, right warm, distal pulses thready, 1+ dependent edema    Labs:                        10.1   13.00 )-----------( 403      ( 21 Dec 2022 02:46 )             31.1     12-21    137  |  103  |  6<L>  ----------------------------<  85  3.9   |  25  |  0.75    Ca    8.1<L>      21 Dec 2022 02:46  Phos  2.8     12-21  Mg     2.1     12-21    TPro  5.4<L>  /  Alb  2.4<L>  /  TBili  1.0  /  DBili  x   /  AST  40  /  ALT  25  /  AlkPhos  106  12-20    PT/INR - ( 20 Dec 2022 04:58 )   PT: 14.0 sec;   INR: 1.17          PTT - ( 21 Dec 2022 08:24 )  PTT:55.7 sec  CARDIAC MARKERS ( 20 Dec 2022 04:58 )  x     / x     / 948 U/L / x     / x          Creatine Kinase, Serum: 948 U/L (12-20-22 @ 04:58)  Creatine Kinase, Serum: 1143 U/L (12-19-22 @ 05:30)  Creatine Kinase, Serum: 948 U/L (12-20-22 @ 04:58)  Creatine Kinase, Serum: 1143 U/L (12-19-22 @ 05:30)        Lactate, Blood: 1.8 mmol/L (12-19 @ 13:22)

## 2022-12-21 NOTE — PROGRESS NOTE ADULT - ATTENDING COMMENTS
Patient extubated, still in rapid flutter. Plan to possibly ambutate L leg due to ischemic changes. On anticoagulaiton currently. Recommending LORENZO and cardioversion tomorrow.

## 2022-12-21 NOTE — PROGRESS NOTE ADULT - ASSESSMENT
78 y/o M with no significant PMHx found unresponsive at his nursing home, resolved after narcan in the field, and brought to [facility]. Found to have LV dysfunction (Tachy mediated), sub-segmental PE, atrial flutter, and large LV thrombus on echo. Transferred to [facility] for further management. Course complicated by acute mesenteric ischemia now s/p embolectomy and bowel resection, septic shock, LLE arterial occlusion.      #HFrEF   EF 10-15%, LVIDD 5. Severely reduced RV function.   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

## 2022-12-21 NOTE — PROGRESS NOTE ADULT - ASSESSMENT
NEURO:    CARDIOVASCULAR:    PULM:    GI:    RENAL:    ENDO:     ID:     HEME/ONC:    PREVENTIVE:   Fluids:  Diet:  DVT ppx:  GI ppx:  Code:  76 y/o M with PMHx polysubstance abuse (opiates, cocaine) found unresponsive at his nursing home, resolved after Narcan in the field, and brought to [facility]. Found to have PE, atrial flutter, and large LV thrombus on echo. Transferred to [facility] for further management. Course c/b mesenteric ischemia requiring bowel resection and embolic limb ischemia.     NEURO  #Opioid use disorder  Urine tox positive for opioids. Hx snorting cocaine/heroin x many years. Denies IVDU. Does not use methadone.  - Consider giving methadone 10mg with signs of withdrawal  - Will call psych consult if patient withdrawing    CARDIOVASCULAR  #Atrial flutter  Found to have atrial flutter at [facility], HR persistently 130 since arrival to [facility]. EP consulted for possible ablation vs cardioversion. S/p Digoxin 1g load. EKG today showing atrial flutter with HR 96.  - Continue Amiodarone 0.5mg gtt x18 hours  - May give Digoxin 0.25mg IVP every other day PRN  - Continue tele monitoring  - Daily EKG    #Acute CHF  No prior medical hx of HF. BNP 15k and echo with LV thrombus at [facility]. 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 Currently euvolemic. TTE 12/7: severely reduced LV systolic function with EF 10-15%, severely reduced RV systolic function, left pleural effusion, trivial pericardial effusion.   - C/w Nipride 1.5 gtt for afterload reduction  - Hold beta blocker for now  - Goal MAP >70-75    #Acute embolic limb ischemia  Patient with cold LE extremities. Absent b/l LE pulses on dopplers. Arterial duplex of b/l LE showing acute thrombus occluding left popliteal artery, acute thrombus completely occluding the left mid superficial femoral artery and extending to the left popliteal artery.   - Vascular consulted emergently  - Patient high risk for emergent high risk procedure, however no specific contraindications and no further cardiac workup indicated. Would continue current regimen of Amio gtt.     #LV thrombus  Echo showing 2.5 x 2.3 cm mobile echodensity in the left ventricular apex, consistent with a thrombus.  - Heparin gtt  - No contraindications to vascular procedure    PULMONARY  #Pulmonary embolism  CTA at [facility] showing evidence of subsegmental PE of right upper lobe. Patient was started on Heparin gtt, then transferred to [facility]. No tachypnea, patient saturating at % on room air. Will continue with full anticoagulation.  - Patient refusing nasal cannula  - C/w Heparin gtt  - Goal PTT  per vascular     GASTROINTESTINAL  #?Hep C infection  Unknown prior hx. Denies IVDU. Positive for Hep C screening.  - F/u Hep C RNA     RENAL  ELEAZAR    INFECTIOUS DISEASE  ELEAZAR    ENDOCRINE  ELEAZAR    HEME  ELEAZAR    PREVENTION:  F: none  E: replete PRN  N: DASH diet with Ensure x3  DVT ppx: heparin gtt  Dispo: CCU  Code: FULL   78 y/o M with PMHx polysubstance abuse (opiates, cocaine) found unresponsive at his nursing home, resolved after Narcan in the field, and brought to [facility]. Found to have PE, atrial flutter, and large LV thrombus on echo. Transferred to [facility] for further management. Course c/b mesenteric ischemia requiring bowel resection and embolic limb ischemia, now awaiting LORENZO with cardioversion.     NEURO  #Opioid use disorder  Urine tox positive for opioids. Hx snorting cocaine/heroin x many years. Denies IVDU. Does not use methadone.  - Will call psych consult if patient withdrawing    #Hx of Passive SI  After being told about likely below knee amputation, pt expressed passive SI and psych was consulted.   - Determined not at acute suicide risk     CARDIOVASCULAR  #Atrial flutter  Found to have atrial flutter at [facility], HR persistently 130 since arrival to [facility]. EP consulted for possible ablation vs cardioversion. S/p Digoxin 1g load. EKG today showing atrial flutter with HR 96.  - Continue Amiodarone 0.5mg gtt x18 hours  - May give Digoxin 0.25mg IVP every other day PRN  - Continue tele monitoring  - Daily EKG    #Acute CHF  No prior medical hx of HF. BNP 15k and echo with LV thrombus at [facility]. 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 Currently euvolemic. TTE 12/7: severely reduced LV systolic function with EF 10-15%, severely reduced RV systolic function, left pleural effusion, trivial pericardial effusion.   - C/w Nipride 1.5 gtt for afterload reduction  - Hold beta blocker for now  - Goal MAP >70-75    #Acute embolic limb ischemia  Patient with cold LE extremities. Absent b/l LE pulses on dopplers. Arterial duplex of b/l LE showing acute thrombus occluding left popliteal artery, acute thrombus completely occluding the left mid superficial femoral artery and extending to the left popliteal artery.   - Vascular consulted emergently  - Patient high risk for emergent high risk procedure, however no specific contraindications and no further cardiac workup indicated. Would continue current regimen of Amio gtt.     #LV thrombus  Echo showing 2.5 x 2.3 cm mobile echodensity in the left ventricular apex, consistent with a thrombus.  - Heparin gtt  - No contraindications to vascular procedure    PULMONARY  #Pulmonary embolism  CTA at [facility] showing evidence of subsegmental PE of right upper lobe. Patient was started on Heparin gtt, then transferred to [facility]. No tachypnea, patient saturating at % on room air. Will continue with full anticoagulation.  - Patient refusing nasal cannula  - C/w Heparin gtt  - Goal PTT  per vascular     GASTROINTESTINAL  #?Hep C infection  Unknown prior hx. Denies IVDU. Positive for Hep C screening.  - F/u Hep C RNA     RENAL  ELEAZAR    INFECTIOUS DISEASE  ELEAZAR    ENDOCRINE  ELEAZAR    HEME  EELAZAR    PREVENTION:  F: none  E: replete PRN  N: DASH diet with Ensure x3  DVT ppx: heparin gtt  Dispo: CCU  Code: FULL   78 y/o M with PMHx polysubstance abuse (opiates, cocaine) found unresponsive at his nursing home, resolved after Narcan in the field, and brought to [facility]. Found to have PE, atrial flutter, and large LV thrombus on echo. Transferred to [facility] for further management. Course c/b mesenteric ischemia requiring bowel resection and embolic limb ischemia, now awaiting LORENZO with cardioversion.     NEURO  #Opioid use disorder  Urine tox positive for opioids. Hx snorting cocaine/heroin x many years. Denies IVDU. Does not use methadone.  - Will call psych consult if patient withdrawing    #Hx of Passive SI  After being told about likely below knee amputation, pt expressed passive SI and psych was consulted.   - Per psych, not at acute suicide risk   - Recommend trazodone 40mg qhs PRN for insomnia     CARDIOVASCULAR  #Atrial flutter  Found to have atrial flutter at [facility], HR persistently 130 with Aflutter since arrival to [facility].   - Continue Amiodarone 0.5mg gtt x18 hours  - May give Digoxin 0.25mg IVP every other day PRN  - Continue tele monitoring  - Daily EKG    #Acute CHF  No prior medical hx of HF. BNP 15k and echo with LV thrombus at [facility]. 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 Currently euvolemic. TTE 12/7: severely reduced LV systolic function with EF 10-15%, severely reduced RV systolic function, left pleural effusion, trivial pericardial effusion.   Etiology: likely tachycardia-induced cardiomyopathy   Diuretics: spironolactone  GDMT: Toprol 50mg po qd, Spironolactone 25mg po qd, Valsartan 20mg po qd  - c/w above GDMT    #Acute embolic limb ischemia, LLE  Patient with cold LE extremities. Absent b/l LE pulses on dopplers. Arterial duplex of b/l LE showing acute thrombus occluding left popliteal artery, acute thrombus completely occluding the left mid superficial femoral artery and extending to the left popliteal artery. Vascular consulted emergently, no acute intervention until demarcated and cardiac optimized. Left LE below knee dusky, cool, no sensation to touch.   - f/u with vascular, plan for BKA once medically optimized     #LV thrombus  Echo showing 2.5 x 2.3 cm mobile echodensity in the left ventricular apex, consistent with a thrombus.  - c/w Heparin gtt 13IU  - LORENZO in 12/22 AM to r/o L atrial appendage clot as pt was off AC for a period of time    PULMONARY  #Pulmonary embolism  CTA at [facility] showing evidence of subsegmental PE of right upper lobe. Patient was started on Heparin gtt, then transferred to [facility]. No tachypnea, patient saturating at % on room air.   - C/w Heparin gtt 13IU  - Goal PTT     GASTROINTESTINAL  #Mesenteric Ischemia  s/p bowel resection of 80cm ischemic bowel with loop ileostomy   - c/w low fiber diet  - Protonix 40mg po qd  - monitor abdomen for signs of peritonitis   - monitor site of midline wound for signs of infection    RENAL  ELEAZAR    INFECTIOUS DISEASE  ELEAZAR    ENDOCRINE  ELEAZAR    HEME  ELEAZAR    PREVENTION:  F: D10 @ 50cc/hr x   E: replete PRN  N: Low Fiber Diet with Ensure x3  GI: Protonix 40mg po qd  DVT ppx: heparin gtt  Dispo: CCU  Code: FULL   76 y/o M with PMHx polysubstance abuse (opiates, cocaine) found unresponsive at his nursing home, resolved after Narcan in the field, and brought to [facility]. Found to have PE, atrial flutter, and large LV thrombus on echo. Transferred to [facility] for further management. Course c/b mesenteric ischemia requiring bowel resection and embolic limb ischemia, now awaiting LORENZO with cardioversion.     NEURO  #Opioid use disorder  Urine tox positive for opioids. Hx snorting cocaine/heroin x many years. Denies IVDU. Does not use methadone.  - Will call psych consult if patient withdrawing    #Hx of Passive SI  After being told about likely below knee amputation, pt expressed passive SI and psych was consulted.   - Per psych, not at acute suicide risk   - Recommend trazodone 40mg qhs PRN for insomnia     CARDIOVASCULAR  #Atrial flutter  Found to have atrial flutter at [facility], HR persistently 130 with Aflutter since arrival to [facility].   - Continue Amiodarone 0.5mg gtt x18 hours  - May give Digoxin 0.25mg IVP every other day PRN  - Continue tele monitoring  - Daily EKG    #Acute CHF  No prior medical hx of HF. BNP 15k and echo with LV thrombus at [facility]. 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 Currently euvolemic. TTE 12/7: severely reduced LV systolic function with EF 10-15%, severely reduced RV systolic function, left pleural effusion, trivial pericardial effusion.   Etiology: likely tachycardia-induced cardiomyopathy   Diuretics: spironolactone  GDMT: Toprol 50mg po qd, Spironolactone 25mg po qd, Valsartan 20mg po qd  - c/w above GDMT    #Acute embolic limb ischemia, LLE  Patient with cold LE extremities. Absent b/l LE pulses on dopplers. Arterial duplex of b/l LE showing acute thrombus occluding left popliteal artery, acute thrombus completely occluding the left mid superficial femoral artery and extending to the left popliteal artery. Vascular consulted emergently, no acute intervention until demarcated and cardiac optimized. Left LE below knee dusky, cool, no sensation to touch.   - f/u with vascular, plan for BKA once medically optimized   - fentanyl patch, dilaudid 0.5mg IVP q3h PRN for pain    #LV thrombus  Echo showing 2.5 x 2.3 cm mobile echodensity in the left ventricular apex, consistent with a thrombus.  - c/w Heparin gtt 13IU  - LORENZO in 12/22 AM to r/o L atrial appendage clot as pt was off AC for a period of time    PULMONARY  #Pulmonary embolism  CTA at [facility] showing evidence of subsegmental PE of right upper lobe. Patient was started on Heparin gtt, then transferred to [facility]. No tachypnea, patient saturating at % on room air.   - C/w Heparin gtt 13IU  - Goal PTT     GASTROINTESTINAL  #Mesenteric Ischemia  s/p bowel resection of 80cm ischemic bowel with loop ileostomy   - c/w low fiber diet  - Protonix 40mg po qd  - monitor abdomen for signs of peritonitis   - monitor site of midline wound for signs of infection    RENAL  ELEAZAR    INFECTIOUS DISEASE  ELEAZAR    ENDOCRINE  ELEAZAR    HEME  ELEAZAR    PREVENTION:  F: D10 @ 50cc/hr x   E: replete PRN  N: Low Fiber Diet with Ensure x3  GI: Protonix 40mg po qd  DVT ppx: heparin gtt  Dispo: CCU  Code: FULL

## 2022-12-21 NOTE — CHART NOTE - NSCHARTNOTEFT_GEN_A_CORE
TRANSFER NOTE:    Hospital Course  78 yo M with history of opiate abuse who was found unresponsive at his nursing home, resolved after Narcan in the field, and brought to [facility], where he was found to have atrial flutter with RVR and PE. CTA from OSH showed subsegmental PE of RUL, for which patient was continued on a heparin gtt. For aflutter, patient was tachycardic to the 130s for which he was given digoxin 1g load and started on amiodarone gtt. EP was consulted for possible ablation vs cardioversion, however patient was deemed too high-risk. TTE showed LVEF 10-15%, 2.5x2.3cm mobile echodensity in the LV apex c/w thrombus, severely reduced RV systolic function, L pleural effusion and trivial pericardial effusion. For new HFrEF, he was continued on a nipride gtt for afterload reduction. Patient underwent LORENZO the following day which showed no thrombus in the LA/RA/ISIAH/RAA. For aflutter, he was started on amiodarone gtt. Patient underwent arterial duplex which showed soft, likely acute thrombus completely occluding the left popliteal artery; additional soft, likely acute thrombus incompletely occluding the left dorsalis pedis artery; there is a soft, likely acute thrombus completely occluding the left mid superficial femoral artery and extending to the left popliteal artery; distal to this is monophasic waveforms. Patient began vomiting, yawning, diarrhea on 12/10/22,, given methadone given concern for opioid withdrawal. Patient had bloody BM and began complaining of abdominal pain. 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 CT also showed irregular cystic structure in L upper pole of L kidney c/f pyelonephritis vs renal infarction. On 12/11/22, Patient underwent emergent ex lap, SMA embolectomy and had 80cm of ischemic bowel resected, abdomen left open and wound vac placed. He returned to OR two more times: 12/13 for additional bowel resection and 12/15 for abdomen closure and creation of loop ileostomy. Patient was extubated on 12/17. His left lower extremity has become ischemic and has started to demarcate below the knee, with skin changes and blistering. Vascular surgery is following and defer intervention until patient is stable from cardiac standpoint. Patient has been hemodynamically stable and pain controlled with fentanyl patch/diluadid. He remains on a heparin gtt for due to A-flutter though not rate controlled. Plan for LORENZO tomorrow 12/22/22 and ablation with Electrophysiology. Patient is being transferred to CCU for cardiac optimization.     Neurologic Medications  fentaNYL   Patch  50 MICROgram(s)/Hr. 1 Patch Transdermal every 48 hours  HYDROmorphone  Injectable 0.5 milliGRAM(s) IV Push every 3 hours PRN Moderate Pain (4 - 6)  HYDROmorphone  Injectable 1 milliGRAM(s) IV Push every 3 hours PRN Severe Pain (7 - 10)    Respiratory Medications    Cardiovascular Medications  metoprolol succinate ER 50 milliGRAM(s) Oral daily  spironolactone 25 milliGRAM(s) Oral daily  valsartan 20 milliGRAM(s) Oral daily    Gastrointestinal Medications  dextrose 10%. 1000 milliLiter(s) IV Continuous <Continuous>  dextrose 5%. 1000 milliLiter(s) IV Continuous <Continuous>  dextrose 5%. 1000 milliLiter(s) IV Continuous <Continuous>  loperamide 2 milliGRAM(s) Oral daily  pantoprazole  Injectable 40 milliGRAM(s) IV Push two times a day    Genitourinary Medications    Hematologic/Oncologic Medications  aspirin enteric coated 81 milliGRAM(s) Oral daily  heparin  Infusion 1300 Unit(s)/Hr IV Continuous <Continuous>  influenza  Vaccine (HIGH DOSE) 0.7 milliLiter(s) IntraMuscular once    Antimicrobial/Immunologic Medications  piperacillin/tazobactam IVPB.. 4.5 Gram(s) IV Intermittent every 8 hours  vancomycin  IVPB 1250 milliGRAM(s) IV Intermittent every 12 hours    Endocrine/Metabolic Medications  dextrose 50% Injectable 25 Gram(s) IV Push once  dextrose 50% Injectable 12.5 Gram(s) IV Push once  dextrose 50% Injectable 25 Gram(s) IV Push once  dextrose Oral Gel 15 Gram(s) Oral once PRN Blood Glucose LESS THAN 70 milliGRAM(s)/deciliter  glucagon  Injectable 1 milliGRAM(s) IntraMuscular once  insulin lispro (ADMELOG) corrective regimen sliding scale   SubCutaneous every 6 hours    Topical/Other Medications  chlorhexidine 0.12% Liquid 15 milliLiter(s) Oral Mucosa every 12 hours  chlorhexidine 2% Cloths 1 Application(s) Topical <User Schedule>  silver sulfADIAZINE 1% Cream 1 Application(s) Topical daily      MEDICATIONS  (PRN):  dextrose Oral Gel 15 Gram(s) Oral once PRN Blood Glucose LESS THAN 70 milliGRAM(s)/deciliter  HYDROmorphone  Injectable 0.5 milliGRAM(s) IV Push every 3 hours PRN Moderate Pain (4 - 6)  HYDROmorphone  Injectable 1 milliGRAM(s) IV Push every 3 hours PRN Severe Pain (7 - 10)      I&O's Detail    20 Dec 2022 07:01  -  21 Dec 2022 07:00  --------------------------------------------------------  IN:    dextrose 5% + lactated ringers: 3000 mL    Heparin: 204 mL    IV PiggyBack: 100 mL    IV PiggyBack: 200 mL    IV PiggyBack: 562.5 mL  Total IN: 4066.5 mL    OUT:    Ileostomy (mL): 1400 mL    Voided (mL): 2215 mL  Total OUT: 3615 mL    Total NET: 451.5 mL      21 Dec 2022 07:01  -  21 Dec 2022 12:27  --------------------------------------------------------  IN:    dextrose 10%: 100 mL    dextrose 5% + lactated ringers: 375 mL    Heparin: 26 mL    Heparin: 36 mL    IV PiggyBack: 187.5 mL    IV PiggyBack: 50 mL  Total IN: 774.5 mL    OUT:    Ileostomy (mL): 425 mL    Voided (mL): 1170 mL  Total OUT: 1595 mL    Total NET: -820.5 mL          Vital Signs Last 24 Hrs  T(C): 35.9 (21 Dec 2022 09:00), Max: 37.2 (21 Dec 2022 05:20)  T(F): 96.7 (21 Dec 2022 09:00), Max: 98.9 (21 Dec 2022 05:20)  HR: 114 (21 Dec 2022 11:00) (109 - 127)  BP: 132/75 (21 Dec 2022 11:00) (108/71 - 166/86)  BP(mean): 96 (21 Dec 2022 11:00) (83 - 111)  RR: 18 (21 Dec 2022 11:00) (10 - 23)  SpO2: 91% (21 Dec 2022 11:00) (91% - 98%)    Parameters below as of 21 Dec 2022 11:00  Patient On (Oxygen Delivery Method): room air        Physical Exam  General: Cachetic elderly male, appears stated age, responsive to questions  Neuro: Grossly intact bilaterally   HEENT: Normocephalic, atraumatic, trachea midline, no JVD   Chest: Equal rise and fall of chest   Heart: Regular S1/S2, atrial flutter   Lungs: Unlabored breathing on room air; Clear to auscultation bilaterally, no adventitious sounds   Abdomen: Soft, non-distended, normoactive bowel sounds throughout, no tenderness to palpation in all 4 quadrants; RLQ ostomy pink with brown liquid, midline laparotomy incision is clean/dry/intact    Upper Extremities: Trace edema in hands, freely mobile bilaterally   Lower Extremities: No edema, SCDs in place on RLE; RLE warm, LLE cool with ruptured blisters from upper calf to toes   Neuro: Severe sensory and motor deficits in LLE. Absent sensation in left toes/foot  Skin: Warm except for LLE, non-diaphoretic throughout     LABS:                        10.1   13.00 )-----------( 403      ( 21 Dec 2022 02:46 )             31.1     12-21    137  |  103  |  6<L>  ----------------------------<  85  3.9   |  25  |  0.75    Ca    8.1<L>      21 Dec 2022 02:46  Phos  2.8     12-21  Mg     2.1     12-21    TPro  5.4<L>  /  Alb  2.4<L>  /  TBili  1.0  /  DBili  x   /  AST  40  /  ALT  25  /  AlkPhos  106  12-20    PT/INR - ( 20 Dec 2022 04:58 )   PT: 14.0 sec;   INR: 1.17          PTT - ( 21 Dec 2022 08:24 )  PTT:55.7 sec      RADIOLOGY & ADDITIONAL STUDIES:        Assessment and Plan:   · Assessment	  Assessment: 76 y/o M with Significant PMHx of IVDU found unresponsive at his nursing home, resolved after Narcan in the field, and brought to [facility]. Found to have PE, atrial flutter, and large LV thrombus on echo. Transferred to [facility] for further management. Pt C/o abdominal pain on 12/10 CTA showing mid SMA with embolus. Abnormal distal small bowel loops and cecum with dilatation and pneumatosis suggesting infarcted bowel. One or two tiny foci of  intrahepatic portal vein pneumatosis. Segmental infarction upper pole left kidney. Now s/p Ex lap, SMA embolectomy, 80cm SBR, abthera vac left in discontinuity (12/11) and transferred to SICU intubated. S/p second look (12/13) and most recently s/p OR for 3rd look, end-to-end anastomosis of remaining bowel, loop ileostomy and abdomen closure (12/15). Remains in SICU now extubated, ostomy with good function,  ischemic LLE pending amputation.      Neuro: Pain: Fentanyl patch 50mcg q48h; IV Tylenol ATC, PRN Dilaudid.   Psych: Agitation improved--now off Precdex. Psych recs: no longer suicidal.   CV: Septic shock--resolved; AFlscotty, EP consulted, will f/u re: ablation vs DCCV,  s/p Amio gtt and Digoxin, started Toprol XL 50; TTE (12/7) CHF- Severe RV dysfxn and LVEF 15%, started Spironolactone and Valsartan. LV thrombus w LLE limb ischemia- Heparin gtt (goal PTT 60-90). ASA 81.   Pulm: Extubated to RA on 12/17, IS  GI: Low Fiber Diet, D5LR @50/hr, PPI. Ileostomy high output--improved.   : Voids- Infarction of upper pole of Left Kidney, Renal US negative on 12/12.   ID: LLE wounds Vanc (12-19--), Zosyn (12/19--), Ischemic bowel: Zosyn (12/11-12/18)    Heme: Hep C positive; Active T&S  Endo: mISS  PPx: SCD, Heparin gtt (PTT goal 60-90) restarted 12/20.  Lines:PIVs D/c: Rt TLC (12/11-12/17), L radial connie (12/11-12/20),   Wounds: abdomen closed, RLQ loop ileostomy, LLE showing signs of demarcation and ruptured blisters --Silvadene.   PT/OT: Ordered 12/17  Dispo: pending LORENZO/Ablation

## 2022-12-21 NOTE — PROGRESS NOTE ADULT - ASSESSMENT
76 y/o M with no significant PMHx found unresponsive at his nursing home, resolved after narcan in the field, and brought to [facility]. Found to have LV dysfunction (suspected Tachy mediated), sub-segmental PE, atrial flutter, and large LV thrombus on echo. Transferred to [facility] for further management. Course complicated by acute mesenteric ischemia now s/p embolectomy and bowel resection, septic shock, LLE arterial occlusion.  EP called back for evaluation of rhythm control for his Atrial flutter given his ongoing clinical improvement.    #Atrial flutter  -rates elevated (100-120bpm)  -on toprol xl 50mg (recently increased from 25mg)  -given HFrEF, would benefit from rhythm control  -if no plan for repeat surgical procedure, please make NPO for LORENZO/DCCV tomorrow  -would benefit from AC from AFl perspective; agent to be decided by primary team given extensive clot burden    Adrian Saleh MD  EP Fellow

## 2022-12-21 NOTE — PROGRESS NOTE ADULT - ASSESSMENT
76yo M with no significant PMH/PSx admitted to cardiology service on 12/7 in the setting of severely reduced LV function 2/2 an LV thrombus. Pt now with several days of post-prandial abdominal pain and loose BMs, initially suspected to be 2/2 opioid withdrawal, however on evening of 12/10 had a large bloody BM. 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 Vascular surgery (already following) with plan for OR. General surgery consulted for possible operative assistance in the setting of bowel ischemia. Now POD2 s/p ex lap, SMA embolectomy and small bowel resection. Plan to RTOR today for second look.    Recommendations:    - Advance diet as tolerated  - Appreciate vascular recs  - F/u cardiology recs  - Monitor UOP  - Rest of care per SICU    - Team 4 will continue to follow

## 2022-12-21 NOTE — PROGRESS NOTE ADULT - ASSESSMENT
Assessment: 76 y/o M with Significant PMHx of IVDU found unresponsive at his nursing home, resolved after Narcan in the field, and brought to [facility]. Found to have PE, atrial flutter, and large LV thrombus on echo. Transferred to [facility] for further management. Pt C/o abdominal pain on 12/10 CTA showing mid SMA with embolus. Abnormal distal small bowel loops and cecum with dilatation and pneumatosis suggesting infarcted bowel. One or two tiny foci of  intrahepatic portal vein pneumatosis. Segmental infarction upper pole left kidney. Now s/p Ex lap, SMA embolectomy, 80cm SBR, abthera vac left in discontinuity (12/11) and transferred to SICU intubated. S/p second look (12/13) and most recently s/p OR for 3rd look, end-to-end anastomosis of remaining bowel, loop ileostomy and abdomen closure (12/15). Remains in SICU now extubated, ostomy with good funciton  ischemic LLE pending amputation.      Neuro: Pain: Fentanyl patch 50mcg q48h; IV Tylenol ATC, PRN Dilaudid.   Psych: Agitation improved--now off Precdex. Psych recs: no longer suicidal.   CV: Septic shock--resolved; Dm, EP consulted, will f/u re: ablation vs DCCV,  s/p Amio gtt and Digoxin, started Toprol XL 50; TTE (12/7) CHF- Severe RV dysfxn and LVEF 15%, started Spironolactone and Valsartan. LV thrombus w LLE limb ischemia- Heparin gtt (goal PTT 60-90). ASA 81.   Pulm: Extubated to RA on 12/17, IS  GI: Low Fiber Diet, D5LR @50/hr, PPI. Ileostomy high output--improved.   : Voids- Infarction of upper pole of Left Kidney, Renal US negative on 12/12.   ID: LLE wounds Vanc (12-19--), Zosyn (12/19--), Ischemic bowel: Zosyn (12/11-12/18)    Heme: Hep C positive; Active T&S  Endo: mISS  PPx: SCD, Heparin gtt (PTT goal 60-90) restarted 12/20.  Lines:PIVs D/c: Rt TLC (12/11-12/17), L radial connie (12/11-12/20),   Wounds: abdomen closed, RLQ loop ileostomy, LLE showing signs of demarcation and ruptured blisters --Silvadene.   PT/OT: Ordered 12/17  Dispo: SICU vs CCU

## 2022-12-21 NOTE — PROGRESS NOTE ADULT - ATTENDING COMMENTS
78 YO M with a history of cocaine/opiate abuse and resident of a nursing facility who was brought to [facility] with unresponsiveness that was alleviated with narcan and found to be in rapid atrial flutter with severe LV dysfunction with mobile LV thrombus and subsegemental PE prompting transfer to [facility] on 12/7. He was also found to have limb ischemia with arterial thrombosis of LLE vessels. He was being considered for rhythm control when he developed abdominal pain and found to have acute mesenteric ischemia and underwent emergent bowel resection and SMA thrombectomy. His LV thrombus us no longer apparent on TTE and has likely embolized.     He had a prolonged open abdomen which is now closed with placement of ostomy. From a HF perspective, he is euvolemic with signs of preserved cardiac output and tolerating uptitration of HF medical therapy. He would benefit from rhythm control when able to tolerate uninterrupted anticoagulation.     REVIEW OF STUDIES  TTE: LV 5.0 cm, LVEF 10-15% with global hypokinesis, 2.5 cm mobile LV thrombus, severe RV dysfunction  LORENZO: no ISIAH thrombus     PLAN  # Acute systolic heart failure  - Etiology is possibly tachycardia induced. EP following and plan is for eventual rhythm control. ischemic evaluation as outpatient if LVEF remains reduced  - GDMT: continue metoprolol succiante 50 mg daily. increase valsartan to 40 mg BID (will switch to entresto 49-51 mg BID after rhythm control). continue spironolactone 25 mg daily. eventual SGLT2i   - Euvolemic off diuretics   - Device premature at this time     # Atrial flutter  - EP following  - Continue heparin drip  - Rates borderline controlled at this time   - Tentative plan for DCCV this admission     # Acute limb ischemia and mesenteric ischemia from LV thrombus  - management per surgery/vascular  - continue anticoagulation    # GOLDIE  - likely post-op ATN and renal infarction, resolved .

## 2022-12-21 NOTE — PROGRESS NOTE ADULT - SUBJECTIVE AND OBJECTIVE BOX
***TRANSFER NOTE: ACCEPTANCE TO CCU FROM SICU***     ***TRANSFER NOTE: ACCEPTANCE TO CCU FROM SICU***  HOSPITAL COURSE:     ******INCOMPLETE******    OVERNIGHT EVENTS/INTERVAL HPI:    OBJECTIVE:  Vital Signs Last 24 Hrs  T(C): 35.9 (21 Dec 2022 09:00), Max: 37.2 (21 Dec 2022 05:20)  T(F): 96.7 (21 Dec 2022 09:00), Max: 98.9 (21 Dec 2022 05:20)  HR: 129 (21 Dec 2022 14:00) (107 - 130)  BP: 167/67 (21 Dec 2022 14:00) (108/71 - 167/67)  BP(mean): 94 (21 Dec 2022 14:00) (83 - 111)  RR: 14 (21 Dec 2022 14:00) (10 - 26)  SpO2: 98% (21 Dec 2022 14:00) (91% - 98%)    Parameters below as of 21 Dec 2022 14:00  Patient On (Oxygen Delivery Method): room air      I&O's Detail    20 Dec 2022 07:01  -  21 Dec 2022 07:00  --------------------------------------------------------  IN:    dextrose 5% + lactated ringers: 3000 mL    Heparin: 204 mL    IV PiggyBack: 100 mL    IV PiggyBack: 200 mL    IV PiggyBack: 562.5 mL  Total IN: 4066.5 mL    OUT:    Ileostomy (mL): 1400 mL    Voided (mL): 2215 mL  Total OUT: 3615 mL    Total NET: 451.5 mL      21 Dec 2022 07:01  -  21 Dec 2022 15:29  --------------------------------------------------------  IN:    dextrose 10%: 200 mL    dextrose 5% + lactated ringers: 375 mL    Heparin: 52 mL    Heparin: 36 mL    IV PiggyBack: 187.5 mL    IV PiggyBack: 100 mL  Total IN: 950.5 mL    OUT:    Ileostomy (mL): 875 mL    Voided (mL): 1395 mL  Total OUT: 2270 mL    Total NET: -1319.5 mL        Physical Exam:  GENERAL: Awake, alert and interactive, no acute distress, appears comfortable  NEURO: A&Ox4, no focal deficits, 5/5 strength in all ext, reflexes 2+ throughout, CN 2-12 intact  HEENT: Normocephalic, atraumatic, no conjunctivitis or scleral icterus, oral mucosa moist, no oral lesions noted  NECK: Supple, no LAD, no JVD, thyroid not palpable  CARDIAC: Regular rate and rhythm, +S1/S2, no murmurs/rubs/gallops  PULM: Breathing comfortably on RA, clear to auscultation bilaterally, no wheezes/rales/rhonchi  ABDOMEN: Soft, nontender, nondistended, +bs, no hepatosplenomegaly, no rebound tenderness or fluid wave, no CVA tenderness  : Deferred  MSK: Range of motion grossly intact, no back tenderness  SKIN: Warm and dry, no rashes, lesions  VASC: Cap refil < 2 sec, 2+ peripheral pulses, no edema, no LE tenderness  Psych: Appropriate affect    Medications:  MEDICATIONS  (STANDING):  aspirin enteric coated 81 milliGRAM(s) Oral daily  chlorhexidine 2% Cloths 1 Application(s) Topical <User Schedule>  dextrose 10%. 1000 milliLiter(s) (50 mL/Hr) IV Continuous <Continuous>  dextrose 5%. 1000 milliLiter(s) (50 mL/Hr) IV Continuous <Continuous>  dextrose 5%. 1000 milliLiter(s) (100 mL/Hr) IV Continuous <Continuous>  dextrose 50% Injectable 25 Gram(s) IV Push once  dextrose 50% Injectable 12.5 Gram(s) IV Push once  dextrose 50% Injectable 25 Gram(s) IV Push once  fentaNYL   Patch  50 MICROgram(s)/Hr. 1 Patch Transdermal every 48 hours  glucagon  Injectable 1 milliGRAM(s) IntraMuscular once  heparin  Infusion 1300 Unit(s)/Hr (13 mL/Hr) IV Continuous <Continuous>  influenza  Vaccine (HIGH DOSE) 0.7 milliLiter(s) IntraMuscular once  insulin lispro (ADMELOG) corrective regimen sliding scale   SubCutaneous every 6 hours  loperamide 2 milliGRAM(s) Oral daily  metoprolol succinate ER 50 milliGRAM(s) Oral daily  pantoprazole  Injectable 40 milliGRAM(s) IV Push two times a day  piperacillin/tazobactam IVPB.. 4.5 Gram(s) IV Intermittent every 8 hours  silver sulfADIAZINE 1% Cream 1 Application(s) Topical daily  spironolactone 25 milliGRAM(s) Oral daily  valsartan 20 milliGRAM(s) Oral daily  vancomycin  IVPB 1250 milliGRAM(s) IV Intermittent every 12 hours    MEDICATIONS  (PRN):  dextrose Oral Gel 15 Gram(s) Oral once PRN Blood Glucose LESS THAN 70 milliGRAM(s)/deciliter  HYDROmorphone  Injectable 0.5 milliGRAM(s) IV Push every 3 hours PRN Moderate Pain (4 - 6)  HYDROmorphone  Injectable 1 milliGRAM(s) IV Push every 3 hours PRN Severe Pain (7 - 10)      Labs:                        10.1   13.00 )-----------( 403      ( 21 Dec 2022 02:46 )             31.1     12-21    137  |  103  |  6<L>  ----------------------------<  85  3.9   |  25  |  0.75    Ca    8.1<L>      21 Dec 2022 02:46  Phos  2.8     12-21  Mg     2.1     12-21    TPro  5.4<L>  /  Alb  2.4<L>  /  TBili  1.0  /  DBili  x   /  AST  40  /  ALT  25  /  AlkPhos  106  12-20    PT/INR - ( 20 Dec 2022 04:58 )   PT: 14.0 sec;   INR: 1.17          PTT - ( 21 Dec 2022 08:24 )  PTT:55.7 sec  ABG - ( 20 Dec 2022 04:55 )  pH, Arterial: 7.45  pH, Blood: x     /  pCO2: 34    /  pO2: 115   / HCO3: 24    / Base Excess: 0.1   /  SaO2: 98.1                COVID-19 PCR: NotDetec (13 Dec 2022 07:34)  COVID-19 PCR: Negative (08 Dec 2022 18:50)  COVID-19 PCR: NotDetec (07 Dec 2022 12:11)      Radiology: Reviewed     ***TRANSFER NOTE: ACCEPTANCE TO CCU FROM SICU***  HOSPITAL COURSE:   76 yo M with history of opiate abuse who was found unresponsive at his nursing home, resolved after Narcan in the field, and brought to [facility], where he was found to have atrial flutter with RVR and PE. CTA from OSH showed subsegmental PE of RUL, for which patient was continued on a heparin gtt. For aflutter, patient was tachycardic to the 130s for which he was given digoxin 1g load and started on amiodarone gtt. EP was consulted for possible ablation vs cardioversion, however patient was deemed too high-risk. TTE showed LVEF 10-15%, 2.5x2.3cm mobile echodensity in the LV apex c/w thrombus, severely reduced RV systolic function, L pleural effusion and trivial pericardial effusion. For new HFrEF, he was continued on a nipride gtt for afterload reduction. Patient underwent LORENZO the following day which showed no thrombus in the LA/RA/ISIAH/RAA. For aflutter, he was started on amiodarone gtt. Patient underwent arterial duplex which showed soft, likely acute thrombus completely occluding the left popliteal artery; additional soft, likely acute thrombus incompletely occluding the left dorsalis pedis artery; there is a soft, likely acute thrombus completely occluding the left mid superficial femoral artery and extending to the left popliteal artery; distal to this is monophasic waveforms. Patient began vomiting, yawning, diarrhea on 12/10/22,, given methadone given concern for opioid withdrawal. Patient had bloody BM and began complaining of abdominal pain. 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 CT also showed irregular cystic structure in L upper pole of L kidney c/f pyelonephritis vs renal infarction. On 12/11/22, Patient underwent emergent ex lap, SMA embolectomy and had 80cm of ischemic bowel resected, abdomen left open and wound vac placed. He returned to OR two more times: 12/13 for additional bowel resection and 12/15 for abdomen closure and creation of loop ileostomy. Patient was extubated on 12/17. His left lower extremity has become ischemic and has started to demarcate below the knee, with skin changes and blistering. Vascular surgery is following and defer intervention until patient is stable from cardiac standpoint. Patient has been hemodynamically stable and pain controlled with fentanyl patch/diluadid. He remains on a heparin gtt for due to A-flutter though not rate controlled. Plan for LORENZO tomorrow 12/22/22 and ablation with Electrophysiology. Patient is being transferred to CCU for cardiac optimization.   ******INCOMPLETE******    OVERNIGHT EVENTS/INTERVAL HPI:    OBJECTIVE:  Vital Signs Last 24 Hrs  T(C): 35.9 (21 Dec 2022 09:00), Max: 37.2 (21 Dec 2022 05:20)  T(F): 96.7 (21 Dec 2022 09:00), Max: 98.9 (21 Dec 2022 05:20)  HR: 129 (21 Dec 2022 14:00) (107 - 130)  BP: 167/67 (21 Dec 2022 14:00) (108/71 - 167/67)  BP(mean): 94 (21 Dec 2022 14:00) (83 - 111)  RR: 14 (21 Dec 2022 14:00) (10 - 26)  SpO2: 98% (21 Dec 2022 14:00) (91% - 98%)    Parameters below as of 21 Dec 2022 14:00  Patient On (Oxygen Delivery Method): room air      I&O's Detail    20 Dec 2022 07:01  -  21 Dec 2022 07:00  --------------------------------------------------------  IN:    dextrose 5% + lactated ringers: 3000 mL    Heparin: 204 mL    IV PiggyBack: 100 mL    IV PiggyBack: 200 mL    IV PiggyBack: 562.5 mL  Total IN: 4066.5 mL    OUT:    Ileostomy (mL): 1400 mL    Voided (mL): 2215 mL  Total OUT: 3615 mL    Total NET: 451.5 mL      21 Dec 2022 07:01  -  21 Dec 2022 15:29  --------------------------------------------------------  IN:    dextrose 10%: 200 mL    dextrose 5% + lactated ringers: 375 mL    Heparin: 52 mL    Heparin: 36 mL    IV PiggyBack: 187.5 mL    IV PiggyBack: 100 mL  Total IN: 950.5 mL    OUT:    Ileostomy (mL): 875 mL    Voided (mL): 1395 mL  Total OUT: 2270 mL    Total NET: -1319.5 mL        Physical Exam:  GENERAL: Awake, alert and interactive, no acute distress, appears comfortable  NEURO: A&Ox4, no focal deficits, 5/5 strength in all ext, reflexes 2+ throughout, CN 2-12 intact  HEENT: Normocephalic, atraumatic, no conjunctivitis or scleral icterus, oral mucosa moist, no oral lesions noted  NECK: Supple, no LAD, no JVD, thyroid not palpable  CARDIAC: Regular rate and rhythm, +S1/S2, no murmurs/rubs/gallops  PULM: Breathing comfortably on RA, clear to auscultation bilaterally, no wheezes/rales/rhonchi  ABDOMEN: Soft, nontender, nondistended, +bs, no hepatosplenomegaly, no rebound tenderness or fluid wave, no CVA tenderness  : Deferred  MSK: Range of motion grossly intact, no back tenderness  SKIN: Warm and dry, no rashes, lesions  VASC: Cap refil < 2 sec, 2+ peripheral pulses, no edema, no LE tenderness  Psych: Appropriate affect    Medications:  MEDICATIONS  (STANDING):  aspirin enteric coated 81 milliGRAM(s) Oral daily  chlorhexidine 2% Cloths 1 Application(s) Topical <User Schedule>  dextrose 10%. 1000 milliLiter(s) (50 mL/Hr) IV Continuous <Continuous>  dextrose 5%. 1000 milliLiter(s) (50 mL/Hr) IV Continuous <Continuous>  dextrose 5%. 1000 milliLiter(s) (100 mL/Hr) IV Continuous <Continuous>  dextrose 50% Injectable 25 Gram(s) IV Push once  dextrose 50% Injectable 12.5 Gram(s) IV Push once  dextrose 50% Injectable 25 Gram(s) IV Push once  fentaNYL   Patch  50 MICROgram(s)/Hr. 1 Patch Transdermal every 48 hours  glucagon  Injectable 1 milliGRAM(s) IntraMuscular once  heparin  Infusion 1300 Unit(s)/Hr (13 mL/Hr) IV Continuous <Continuous>  influenza  Vaccine (HIGH DOSE) 0.7 milliLiter(s) IntraMuscular once  insulin lispro (ADMELOG) corrective regimen sliding scale   SubCutaneous every 6 hours  loperamide 2 milliGRAM(s) Oral daily  metoprolol succinate ER 50 milliGRAM(s) Oral daily  pantoprazole  Injectable 40 milliGRAM(s) IV Push two times a day  piperacillin/tazobactam IVPB.. 4.5 Gram(s) IV Intermittent every 8 hours  silver sulfADIAZINE 1% Cream 1 Application(s) Topical daily  spironolactone 25 milliGRAM(s) Oral daily  valsartan 20 milliGRAM(s) Oral daily  vancomycin  IVPB 1250 milliGRAM(s) IV Intermittent every 12 hours    MEDICATIONS  (PRN):  dextrose Oral Gel 15 Gram(s) Oral once PRN Blood Glucose LESS THAN 70 milliGRAM(s)/deciliter  HYDROmorphone  Injectable 0.5 milliGRAM(s) IV Push every 3 hours PRN Moderate Pain (4 - 6)  HYDROmorphone  Injectable 1 milliGRAM(s) IV Push every 3 hours PRN Severe Pain (7 - 10)      Labs:                        10.1   13.00 )-----------( 403      ( 21 Dec 2022 02:46 )             31.1     12-21    137  |  103  |  6<L>  ----------------------------<  85  3.9   |  25  |  0.75    Ca    8.1<L>      21 Dec 2022 02:46  Phos  2.8     12-21  Mg     2.1     12-21    TPro  5.4<L>  /  Alb  2.4<L>  /  TBili  1.0  /  DBili  x   /  AST  40  /  ALT  25  /  AlkPhos  106  12-20    PT/INR - ( 20 Dec 2022 04:58 )   PT: 14.0 sec;   INR: 1.17          PTT - ( 21 Dec 2022 08:24 )  PTT:55.7 sec  ABG - ( 20 Dec 2022 04:55 )  pH, Arterial: 7.45  pH, Blood: x     /  pCO2: 34    /  pO2: 115   / HCO3: 24    / Base Excess: 0.1   /  SaO2: 98.1                COVID-19 PCR: NotDetec (13 Dec 2022 07:34)  COVID-19 PCR: Negative (08 Dec 2022 18:50)  COVID-19 PCR: NotDetec (07 Dec 2022 12:11)      Radiology: Reviewed     ***TRANSFER NOTE: ACCEPTANCE TO CCU FROM SICU***  HOSPITAL COURSE:   76 yo M with history of opiate abuse who was found unresponsive at his nursing home, resolved after Narcan in the field, and brought to [facility], where he was found to have atrial flutter with RVR and PE. CTA from OSH showed subsegmental PE of RUL, for which patient was continued on a heparin gtt. For aflutter, patient was tachycardic to the 130s for which he was given digoxin 1g load and started on amiodarone gtt. EP was consulted for possible ablation vs cardioversion, however patient was deemed too high-risk. TTE showed LVEF 10-15%, 2.5x2.3cm mobile echodensity in the LV apex c/w thrombus, severely reduced RV systolic function, L pleural effusion and trivial pericardial effusion. For new HFrEF, he was continued on a nipride gtt for afterload reduction. Patient underwent LORENZO the following day which showed no thrombus in the LA/RA/ISIAH/RAA. For aflutter, he was started on amiodarone gtt. Patient underwent arterial duplex which showed 1) soft, likely acute thrombus completely occluding L popliteal artery, 2) soft likely acute thrombus incompletely occluding the left dorsalis pedis artery, 3) soft likely acute thrombus completely occluding the left mid superficial femoral artery and extending to the left popliteal artery. Patient began vomiting, yawning, diarrhea on 12/10/22, given methadone given c/f opioid withdrawal. Patient had bloody BM a/w abdominal pain. 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 On 12/11/22, Patient underwent emergent ex lap, SMA embolectomy and had 80cm of ischemic bowel resected, abdomen left open and wound vac placed. He returned to OR two more times: 12/13 for additional bowel resection and 12/15 for abdomen closure and creation of loop ileostomy. Patient was extubated on 12/17. His LLE has become ischemic and has started to demarcate below the knee, with skin changes and blistering. Vascular surgery is following and defer intervention until patient is stable from cardiac standpoint. Patient has been hemodynamically stable and pain controlled with fentanyl patch/diluadid. He remains on a heparin gtt for due to A-flutter though not rate controlled. Plan for LORENZO tomorrow 12/22/22 and ablation with Electrophysiology. Patient is being transferred to CCU for cardiac optimization.   ******INCOMPLETE******    OVERNIGHT EVENTS/INTERVAL HPI:    OBJECTIVE:  Vital Signs Last 24 Hrs  T(C): 35.9 (21 Dec 2022 09:00), Max: 37.2 (21 Dec 2022 05:20)  T(F): 96.7 (21 Dec 2022 09:00), Max: 98.9 (21 Dec 2022 05:20)  HR: 129 (21 Dec 2022 14:00) (107 - 130)  BP: 167/67 (21 Dec 2022 14:00) (108/71 - 167/67)  BP(mean): 94 (21 Dec 2022 14:00) (83 - 111)  RR: 14 (21 Dec 2022 14:00) (10 - 26)  SpO2: 98% (21 Dec 2022 14:00) (91% - 98%)    Parameters below as of 21 Dec 2022 14:00  Patient On (Oxygen Delivery Method): room air      I&O's Detail    20 Dec 2022 07:01  -  21 Dec 2022 07:00  --------------------------------------------------------  IN:    dextrose 5% + lactated ringers: 3000 mL    Heparin: 204 mL    IV PiggyBack: 100 mL    IV PiggyBack: 200 mL    IV PiggyBack: 562.5 mL  Total IN: 4066.5 mL    OUT:    Ileostomy (mL): 1400 mL    Voided (mL): 2215 mL  Total OUT: 3615 mL    Total NET: 451.5 mL      21 Dec 2022 07:01  -  21 Dec 2022 15:29  --------------------------------------------------------  IN:    dextrose 10%: 200 mL    dextrose 5% + lactated ringers: 375 mL    Heparin: 52 mL    Heparin: 36 mL    IV PiggyBack: 187.5 mL    IV PiggyBack: 100 mL  Total IN: 950.5 mL    OUT:    Ileostomy (mL): 875 mL    Voided (mL): 1395 mL  Total OUT: 2270 mL    Total NET: -1319.5 mL        Physical Exam:  GENERAL: Awake, alert and interactive, no acute distress, appears comfortable  NEURO: A&Ox4, no focal deficits, 5/5 strength in all ext, reflexes 2+ throughout, CN 2-12 intact  HEENT: Normocephalic, atraumatic, no conjunctivitis or scleral icterus, oral mucosa moist, no oral lesions noted  NECK: Supple, no LAD, no JVD, thyroid not palpable  CARDIAC: Regular rate and rhythm, +S1/S2, no murmurs/rubs/gallops  PULM: Breathing comfortably on RA, clear to auscultation bilaterally, no wheezes/rales/rhonchi  ABDOMEN: Soft, nontender, nondistended, +bs, no hepatosplenomegaly, no rebound tenderness or fluid wave, no CVA tenderness  : Deferred  MSK: Range of motion grossly intact, no back tenderness  SKIN: Warm and dry, no rashes, lesions  VASC: Cap refil < 2 sec, 2+ peripheral pulses, no edema, no LE tenderness  Psych: Appropriate affect    Medications:  MEDICATIONS  (STANDING):  aspirin enteric coated 81 milliGRAM(s) Oral daily  chlorhexidine 2% Cloths 1 Application(s) Topical <User Schedule>  dextrose 10%. 1000 milliLiter(s) (50 mL/Hr) IV Continuous <Continuous>  dextrose 5%. 1000 milliLiter(s) (50 mL/Hr) IV Continuous <Continuous>  dextrose 5%. 1000 milliLiter(s) (100 mL/Hr) IV Continuous <Continuous>  dextrose 50% Injectable 25 Gram(s) IV Push once  dextrose 50% Injectable 12.5 Gram(s) IV Push once  dextrose 50% Injectable 25 Gram(s) IV Push once  fentaNYL   Patch  50 MICROgram(s)/Hr. 1 Patch Transdermal every 48 hours  glucagon  Injectable 1 milliGRAM(s) IntraMuscular once  heparin  Infusion 1300 Unit(s)/Hr (13 mL/Hr) IV Continuous <Continuous>  influenza  Vaccine (HIGH DOSE) 0.7 milliLiter(s) IntraMuscular once  insulin lispro (ADMELOG) corrective regimen sliding scale   SubCutaneous every 6 hours  loperamide 2 milliGRAM(s) Oral daily  metoprolol succinate ER 50 milliGRAM(s) Oral daily  pantoprazole  Injectable 40 milliGRAM(s) IV Push two times a day  piperacillin/tazobactam IVPB.. 4.5 Gram(s) IV Intermittent every 8 hours  silver sulfADIAZINE 1% Cream 1 Application(s) Topical daily  spironolactone 25 milliGRAM(s) Oral daily  valsartan 20 milliGRAM(s) Oral daily  vancomycin  IVPB 1250 milliGRAM(s) IV Intermittent every 12 hours    MEDICATIONS  (PRN):  dextrose Oral Gel 15 Gram(s) Oral once PRN Blood Glucose LESS THAN 70 milliGRAM(s)/deciliter  HYDROmorphone  Injectable 0.5 milliGRAM(s) IV Push every 3 hours PRN Moderate Pain (4 - 6)  HYDROmorphone  Injectable 1 milliGRAM(s) IV Push every 3 hours PRN Severe Pain (7 - 10)      Labs:                        10.1   13.00 )-----------( 403      ( 21 Dec 2022 02:46 )             31.1     12-21    137  |  103  |  6<L>  ----------------------------<  85  3.9   |  25  |  0.75    Ca    8.1<L>      21 Dec 2022 02:46  Phos  2.8     12-21  Mg     2.1     12-21    TPro  5.4<L>  /  Alb  2.4<L>  /  TBili  1.0  /  DBili  x   /  AST  40  /  ALT  25  /  AlkPhos  106  12-20    PT/INR - ( 20 Dec 2022 04:58 )   PT: 14.0 sec;   INR: 1.17          PTT - ( 21 Dec 2022 08:24 )  PTT:55.7 sec  ABG - ( 20 Dec 2022 04:55 )  pH, Arterial: 7.45  pH, Blood: x     /  pCO2: 34    /  pO2: 115   / HCO3: 24    / Base Excess: 0.1   /  SaO2: 98.1                COVID-19 PCR: NotDetec (13 Dec 2022 07:34)  COVID-19 PCR: Negative (08 Dec 2022 18:50)  COVID-19 PCR: NotDetec (07 Dec 2022 12:11)      Radiology: Reviewed     ***TRANSFER NOTE: ACCEPTANCE TO CCU FROM SICU***  HOSPITAL COURSE:   76 yo M with history of opiate abuse who was found unresponsive at his nursing home, resolved after Narcan in the field, and brought to [facility], where he was found to have atrial flutter with RVR and PE. CTA from OSH showed subsegmental PE of RUL, for which patient was continued on a heparin gtt. For aflutter, patient was tachycardic to the 130s for which he was given digoxin 1g load and started on amiodarone gtt. EP was consulted for possible ablation vs cardioversion, however patient was deemed too high-risk. TTE showed LVEF 10-15%, 2.5x2.3cm mobile echodensity in the LV apex c/w thrombus, severely reduced RV systolic function, L pleural effusion and trivial pericardial effusion. For new HFrEF, he was continued on a nipride gtt for afterload reduction. Patient underwent LORENZO the following day which showed no thrombus in the LA/RA/ISIAH/RAA. For aflutter, he was started on amiodarone gtt. Patient underwent arterial duplex which showed 1) soft, likely acute thrombus completely occluding L popliteal artery, 2) soft likely acute thrombus incompletely occluding the left dorsalis pedis artery, 3) soft likely acute thrombus completely occluding the left mid superficial femoral artery and extending to the left popliteal artery. Patient began vomiting, yawning, diarrhea on 12/10/22, given methadone given c/f opioid withdrawal. Patient had bloody BM a/w abdominal pain. 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 On 12/11/22, Patient underwent emergent ex lap, SMA embolectomy and had 80cm of ischemic bowel resected, abdomen left open and wound vac placed. He returned to OR two more times: 12/13 for additional bowel resection and 12/15 for abdomen closure and creation of loop ileostomy. Patient was extubated on 12/17. His LLE has become ischemic and has started to demarcate below the knee, with skin changes and blistering. Vascular surgery is following and defer intervention until patient is stable from cardiac standpoint. Patient has been hemodynamically stable and pain controlled with fentanyl patch/diluadid. He remains on a heparin gtt for due to A-flutter though not rate controlled. Plan for LORENZO tomorrow 12/22/22 and ablation with Electrophysiology. Patient is being transferred to CCU for LORENOZ with cardioversion.     OVERNIGHT EVENTS/INTERVAL HPI: Pt was examined at beside where he was resting comfortably. He has no acute complaints at the time. Denies fever, chills, HA, dizziness, chest pain, palpitations, dyspnea, N/V, abdominal pain, LE pain. Pt understands he will be going for a cardiac procedure tomorrow AM.     OBJECTIVE:  Vital Signs Last 24 Hrs  T(C): 35.9 (21 Dec 2022 09:00), Max: 37.2 (21 Dec 2022 05:20)  T(F): 96.7 (21 Dec 2022 09:00), Max: 98.9 (21 Dec 2022 05:20)  HR: 129 (21 Dec 2022 14:00) (107 - 130)  BP: 167/67 (21 Dec 2022 14:00) (108/71 - 167/67)  BP(mean): 94 (21 Dec 2022 14:00) (83 - 111)  RR: 14 (21 Dec 2022 14:00) (10 - 26)  SpO2: 98% (21 Dec 2022 14:00) (91% - 98%)    Parameters below as of 21 Dec 2022 14:00  Patient On (Oxygen Delivery Method): room air      I&O's Detail    20 Dec 2022 07:01  -  21 Dec 2022 07:00  --------------------------------------------------------  IN:    dextrose 5% + lactated ringers: 3000 mL    Heparin: 204 mL    IV PiggyBack: 100 mL    IV PiggyBack: 200 mL    IV PiggyBack: 562.5 mL  Total IN: 4066.5 mL    OUT:    Ileostomy (mL): 1400 mL    Voided (mL): 2215 mL  Total OUT: 3615 mL    Total NET: 451.5 mL      21 Dec 2022 07:01  -  21 Dec 2022 15:29  --------------------------------------------------------  IN:    dextrose 10%: 200 mL    dextrose 5% + lactated ringers: 375 mL    Heparin: 52 mL    Heparin: 36 mL    IV PiggyBack: 187.5 mL    IV PiggyBack: 100 mL  Total IN: 950.5 mL    OUT:    Ileostomy (mL): 875 mL    Voided (mL): 1395 mL  Total OUT: 2270 mL    Total NET: -1319.5 mL    Physical Exam:  GENERAL: Awake, alert and interactive, no acute distress, appears comfortable  NEURO: A&Ox3, no sensation to touch in LLE below knee in visibly ischemic region  HEENT: no conjunctivitis or scleral icterus, oral mucosa dry  NECK: no JVD  CARDIAC: irregular rate and rhythm, distant heart sounds, no murmurs/rubs/gallops  PULM: Breathing comfortably on RA, clear to auscultation bilaterally anteriorly, no wheezes/rales/rhonchi  ABDOMEN: Soft, nontender, nondistended, midline surgical scar with staples, +bs, ileostomy site in RLQ clean  : Uriarte catheter draining clear yellow urine  MSK: Range of motion grossly intact, no back tenderness  SKIN: RLE warm and dry. LLE below knee wrapped with gauze with dusky demarcation from ischemic limb visible at superior border  VASC: 2+ radial pulses, nonpalpable LE pulses, 2-3+ pitting edema b/l LE, no LE tenderness    Medications:  MEDICATIONS  (STANDING):  aspirin enteric coated 81 milliGRAM(s) Oral daily  chlorhexidine 2% Cloths 1 Application(s) Topical <User Schedule>  dextrose 10%. 1000 milliLiter(s) (50 mL/Hr) IV Continuous <Continuous>  dextrose 5%. 1000 milliLiter(s) (50 mL/Hr) IV Continuous <Continuous>  dextrose 5%. 1000 milliLiter(s) (100 mL/Hr) IV Continuous <Continuous>  dextrose 50% Injectable 25 Gram(s) IV Push once  dextrose 50% Injectable 12.5 Gram(s) IV Push once  dextrose 50% Injectable 25 Gram(s) IV Push once  fentaNYL   Patch  50 MICROgram(s)/Hr. 1 Patch Transdermal every 48 hours  glucagon  Injectable 1 milliGRAM(s) IntraMuscular once  heparin  Infusion 1300 Unit(s)/Hr (13 mL/Hr) IV Continuous <Continuous>  influenza  Vaccine (HIGH DOSE) 0.7 milliLiter(s) IntraMuscular once  insulin lispro (ADMELOG) corrective regimen sliding scale   SubCutaneous every 6 hours  loperamide 2 milliGRAM(s) Oral daily  metoprolol succinate ER 50 milliGRAM(s) Oral daily  pantoprazole  Injectable 40 milliGRAM(s) IV Push two times a day  piperacillin/tazobactam IVPB.. 4.5 Gram(s) IV Intermittent every 8 hours  silver sulfADIAZINE 1% Cream 1 Application(s) Topical daily  spironolactone 25 milliGRAM(s) Oral daily  valsartan 20 milliGRAM(s) Oral daily  vancomycin  IVPB 1250 milliGRAM(s) IV Intermittent every 12 hours    MEDICATIONS  (PRN):  dextrose Oral Gel 15 Gram(s) Oral once PRN Blood Glucose LESS THAN 70 milliGRAM(s)/deciliter  HYDROmorphone  Injectable 0.5 milliGRAM(s) IV Push every 3 hours PRN Moderate Pain (4 - 6)  HYDROmorphone  Injectable 1 milliGRAM(s) IV Push every 3 hours PRN Severe Pain (7 - 10)      Labs:                        10.1   13.00 )-----------( 403      ( 21 Dec 2022 02:46 )             31.1     12-21    137  |  103  |  6<L>  ----------------------------<  85  3.9   |  25  |  0.75    Ca    8.1<L>      21 Dec 2022 02:46  Phos  2.8     12-21  Mg     2.1     12-21    TPro  5.4<L>  /  Alb  2.4<L>  /  TBili  1.0  /  DBili  x   /  AST  40  /  ALT  25  /  AlkPhos  106  12-20    PT/INR - ( 20 Dec 2022 04:58 )   PT: 14.0 sec;   INR: 1.17          PTT - ( 21 Dec 2022 08:24 )  PTT:55.7 sec  ABG - ( 20 Dec 2022 04:55 )  pH, Arterial: 7.45  pH, Blood: x     /  pCO2: 34    /  pO2: 115   / HCO3: 24    / Base Excess: 0.1   /  SaO2: 98.1                COVID-19 PCR: NotDetec (13 Dec 2022 07:34)  COVID-19 PCR: Negative (08 Dec 2022 18:50)  COVID-19 PCR: NotDetec (07 Dec 2022 12:11)      Radiology: Reviewed

## 2022-12-21 NOTE — PROGRESS NOTE ADULT - SUBJECTIVE AND OBJECTIVE BOX
SUBJECTIVE: Pt seen and examined at bedside this am. Patient is sleepy but denies pain in his leg.     MEDICATIONS  (STANDING):  aspirin enteric coated 81 milliGRAM(s) Oral daily  chlorhexidine 2% Cloths 1 Application(s) Topical <User Schedule>  dextrose 10%. 1000 milliLiter(s) (50 mL/Hr) IV Continuous <Continuous>  dextrose 5%. 1000 milliLiter(s) (50 mL/Hr) IV Continuous <Continuous>  dextrose 5%. 1000 milliLiter(s) (100 mL/Hr) IV Continuous <Continuous>  dextrose 50% Injectable 25 Gram(s) IV Push once  dextrose 50% Injectable 12.5 Gram(s) IV Push once  dextrose 50% Injectable 25 Gram(s) IV Push once  fentaNYL   Patch  50 MICROgram(s)/Hr. 1 Patch Transdermal every 48 hours  glucagon  Injectable 1 milliGRAM(s) IntraMuscular once  heparin  Infusion 1300 Unit(s)/Hr (13 mL/Hr) IV Continuous <Continuous>  influenza  Vaccine (HIGH DOSE) 0.7 milliLiter(s) IntraMuscular once  insulin lispro (ADMELOG) corrective regimen sliding scale   SubCutaneous every 6 hours  loperamide 2 milliGRAM(s) Oral daily  metoprolol succinate ER 50 milliGRAM(s) Oral daily  pantoprazole  Injectable 40 milliGRAM(s) IV Push two times a day  piperacillin/tazobactam IVPB.. 4.5 Gram(s) IV Intermittent every 8 hours  silver sulfADIAZINE 1% Cream 1 Application(s) Topical daily  spironolactone 25 milliGRAM(s) Oral daily  valsartan 20 milliGRAM(s) Oral daily  vancomycin  IVPB 1250 milliGRAM(s) IV Intermittent every 12 hours    MEDICATIONS  (PRN):  dextrose Oral Gel 15 Gram(s) Oral once PRN Blood Glucose LESS THAN 70 milliGRAM(s)/deciliter  HYDROmorphone  Injectable 0.5 milliGRAM(s) IV Push every 3 hours PRN Moderate Pain (4 - 6)  HYDROmorphone  Injectable 1 milliGRAM(s) IV Push every 3 hours PRN Severe Pain (7 - 10)      Vital Signs Last 24 Hrs  T(C): 35.9 (21 Dec 2022 09:00), Max: 37.2 (21 Dec 2022 05:20)  T(F): 96.7 (21 Dec 2022 09:00), Max: 98.9 (21 Dec 2022 05:20)  HR: 129 (21 Dec 2022 14:00) (107 - 130)  BP: 167/67 (21 Dec 2022 14:00) (108/71 - 167/67)  BP(mean): 94 (21 Dec 2022 14:00) (83 - 111)  RR: 14 (21 Dec 2022 14:00) (10 - 26)  SpO2: 98% (21 Dec 2022 14:00) (91% - 98%)    Parameters below as of 21 Dec 2022 14:00  Patient On (Oxygen Delivery Method): room air        Physical Exam  General: NAD,   Pulmonary: Nonlabored breathing,   CV: NSR  Abd: soft, ileostomy pink with red rubber in place, ostomy bag with liquid output (no blood),  Extremities: minimal edema, left calf/foot cool, right calf/foot warm, well-perfused, no L DP/PT signals, R PT mono, no R DP, patchy discoloration of the left calf, calf blisters have popped, decreased sensation in L foot and anterior portion of calf. wrapped in kerlex, toes necrotic      I&O's Detail    20 Dec 2022 07:01  -  21 Dec 2022 07:00  --------------------------------------------------------  IN:    dextrose 5% + lactated ringers: 3000 mL    Heparin: 204 mL    IV PiggyBack: 100 mL    IV PiggyBack: 200 mL    IV PiggyBack: 562.5 mL  Total IN: 4066.5 mL    OUT:    Ileostomy (mL): 1400 mL    Voided (mL): 2215 mL  Total OUT: 3615 mL    Total NET: 451.5 mL      21 Dec 2022 07:01  -  21 Dec 2022 15:42  --------------------------------------------------------  IN:    dextrose 10%: 200 mL    dextrose 5% + lactated ringers: 375 mL    Heparin: 52 mL    Heparin: 36 mL    IV PiggyBack: 187.5 mL    IV PiggyBack: 100 mL  Total IN: 950.5 mL    OUT:    Ileostomy (mL): 875 mL    Voided (mL): 1395 mL  Total OUT: 2270 mL    Total NET: -1319.5 mL          LABS:                        10.1   13.00 )-----------( 403      ( 21 Dec 2022 02:46 )             31.1     12-21    137  |  103  |  6<L>  ----------------------------<  85  3.9   |  25  |  0.75    Ca    8.1<L>      21 Dec 2022 02:46  Phos  2.8     12-21  Mg     2.1     12-21    TPro  5.4<L>  /  Alb  2.4<L>  /  TBili  1.0  /  DBili  x   /  AST  40  /  ALT  25  /  AlkPhos  106  12-20    PT/INR - ( 20 Dec 2022 04:58 )   PT: 14.0 sec;   INR: 1.17          PTT - ( 21 Dec 2022 08:24 )  PTT:55.7 sec      RADIOLOGY & ADDITIONAL STUDIES:

## 2022-12-21 NOTE — CHART NOTE - NSCHARTNOTEFT_GEN_A_CORE
The writer met with the patient for individual psychotherapy for 15 minutes to explore his response to his current hospitalization.

## 2022-12-21 NOTE — PROGRESS NOTE ADULT - ASSESSMENT
77 year old male with no documented past medical history presenting from [facility] after being found unresponsive at his nursing home s/p narcan with resolution. Found to have atrial flutter. Echo showed severely reduced LV function with an LV thrombus. Heparin gtt was started and pt transferred to [facility] for LORENZO and possible ablation. Vascular consulted for cold leg. Bilateral Duplex (12/8) showed acute thrombus completely occluding the left popliteal artery and acute thrombus incompletely occluding the left dorsalis pedis artery and likely acute thrombus completely occluding the left mid superficial femoral artery and extending to the left popliteal artery. Left DP with absent signals. Patient noted to have acute abdominal pain overnight with bloody diarrhea c/f acute mesenteric ischemia and taken emergently to the OR. Now POD2 s/p ex lap, SMA embolectomy, and SBR (80 cm), s/p second look with additional small bowel resection (50cm) with wound left open (abthera in place) with plans for second look today. No need mesenteric angiogram today as patient is off pressors, extubated and hemodynamically stable.     Plan:   - Recommend continued hep gtt to goal of PTT    - no indication for acute vascular intervention at this time - awaiting for leg to demarcate  - Rest of care per SICU  - Vascular surgery Team 3C will continue to follow. Please call [phone] with any questions or concerns.    78 y/o M with Significant PMHx of IVDU found unresponsive at his nursing home, resolved after Narcan in the field, and brought to [facility]. Found to have PE, atrial flutter, and large LV thrombus on echo. Transferred to [facility] for further management. Pt C/o abdominal pain on 12/10 CTA showing mid SMA with embolus. Abnormal distal small bowel loops and cecum with dilatation and pneumatosis suggesting infarcted bowel. One or two tiny foci of  intrahepatic portal vein pneumatosis. Segmental infarction upper pole left kidney. Now s/p Ex lap, SMA embolectomy, 80cm SBR, abthera vac left in discontinuity (12/11) and transferred to SICU intubated. S/p second look (12/13) and most recently s/p OR for 3rd look, end-to-end anastomosis of remaining bowel, loop ileostomy and abdomen closure (12/15). Remains in SICU now extubated with acute limb ischemia to LLE pending amputation. Patient is off pressors, extubated and hemodynamically stable.     Plan:   - Recommend continued hep gtt to goal of PTT    - no indication for acute vascular intervention at this time - awaiting for leg to demarcate  - Rest of care per SICU  - Vascular surgery Team 3C will continue to follow. Please call [phone] with any questions or concerns.

## 2022-12-21 NOTE — PROGRESS NOTE ADULT - SUBJECTIVE AND OBJECTIVE BOX
INTERVAL/OVERNIGHT EVENTS:    SUBJECTIVE:  Patient seen at bedside in no acute distress. Complaining that he has been unable to sleep well the past few nights. Pain controlled. He denies any fevers chills, CP, SOB, or abdominal pain.    Neurologic Medications  fentaNYL   Patch  50 MICROgram(s)/Hr. 1 Patch Transdermal every 48 hours  HYDROmorphone  Injectable 0.5 milliGRAM(s) IV Push every 3 hours PRN Moderate Pain (4 - 6)  HYDROmorphone  Injectable 1 milliGRAM(s) IV Push every 3 hours PRN Severe Pain (7 - 10)    Respiratory Medications    Cardiovascular Medications  metoprolol succinate ER 50 milliGRAM(s) Oral daily  spironolactone 25 milliGRAM(s) Oral daily  valsartan 20 milliGRAM(s) Oral daily    Gastrointestinal Medications  dextrose 10%. 1000 milliLiter(s) IV Continuous <Continuous>  dextrose 5%. 1000 milliLiter(s) IV Continuous <Continuous>  dextrose 5%. 1000 milliLiter(s) IV Continuous <Continuous>  loperamide 2 milliGRAM(s) Oral daily  pantoprazole  Injectable 40 milliGRAM(s) IV Push two times a day    Genitourinary Medications    Hematologic/Oncologic Medications  aspirin enteric coated 81 milliGRAM(s) Oral daily  heparin  Infusion 1300 Unit(s)/Hr IV Continuous <Continuous>  influenza  Vaccine (HIGH DOSE) 0.7 milliLiter(s) IntraMuscular once    Antimicrobial/Immunologic Medications  piperacillin/tazobactam IVPB.. 4.5 Gram(s) IV Intermittent every 8 hours  vancomycin  IVPB 1250 milliGRAM(s) IV Intermittent every 12 hours    Endocrine/Metabolic Medications  dextrose 50% Injectable 25 Gram(s) IV Push once  dextrose 50% Injectable 12.5 Gram(s) IV Push once  dextrose 50% Injectable 25 Gram(s) IV Push once  dextrose Oral Gel 15 Gram(s) Oral once PRN Blood Glucose LESS THAN 70 milliGRAM(s)/deciliter  glucagon  Injectable 1 milliGRAM(s) IntraMuscular once  insulin lispro (ADMELOG) corrective regimen sliding scale   SubCutaneous every 6 hours    Topical/Other Medications  chlorhexidine 0.12% Liquid 15 milliLiter(s) Oral Mucosa every 12 hours  chlorhexidine 2% Cloths 1 Application(s) Topical <User Schedule>  silver sulfADIAZINE 1% Cream 1 Application(s) Topical daily      MEDICATIONS  (PRN):  dextrose Oral Gel 15 Gram(s) Oral once PRN Blood Glucose LESS THAN 70 milliGRAM(s)/deciliter  HYDROmorphone  Injectable 0.5 milliGRAM(s) IV Push every 3 hours PRN Moderate Pain (4 - 6)  HYDROmorphone  Injectable 1 milliGRAM(s) IV Push every 3 hours PRN Severe Pain (7 - 10)      I&O's Detail    20 Dec 2022 07:01  -  21 Dec 2022 07:00  --------------------------------------------------------  IN:    dextrose 5% + lactated ringers: 3000 mL    Heparin: 204 mL    IV PiggyBack: 100 mL    IV PiggyBack: 200 mL    IV PiggyBack: 562.5 mL  Total IN: 4066.5 mL    OUT:    Ileostomy (mL): 1400 mL    Voided (mL): 2215 mL  Total OUT: 3615 mL    Total NET: 451.5 mL      21 Dec 2022 07:01  -  21 Dec 2022 12:27  --------------------------------------------------------  IN:    dextrose 10%: 100 mL    dextrose 5% + lactated ringers: 375 mL    Heparin: 26 mL    Heparin: 36 mL    IV PiggyBack: 187.5 mL    IV PiggyBack: 50 mL  Total IN: 774.5 mL    OUT:    Ileostomy (mL): 425 mL    Voided (mL): 1170 mL  Total OUT: 1595 mL    Total NET: -820.5 mL          Vital Signs Last 24 Hrs  T(C): 35.9 (21 Dec 2022 09:00), Max: 37.2 (21 Dec 2022 05:20)  T(F): 96.7 (21 Dec 2022 09:00), Max: 98.9 (21 Dec 2022 05:20)  HR: 114 (21 Dec 2022 11:00) (109 - 127)  BP: 132/75 (21 Dec 2022 11:00) (108/71 - 166/86)  BP(mean): 96 (21 Dec 2022 11:00) (83 - 111)  RR: 18 (21 Dec 2022 11:00) (10 - 23)  SpO2: 91% (21 Dec 2022 11:00) (91% - 98%)    Parameters below as of 21 Dec 2022 11:00  Patient On (Oxygen Delivery Method): room air        Physical Exam  General: Cachetic elderly male, appears stated age, responsive to questions  Neuro: Grossly intact bilaterally   HEENT: Normocephalic, atraumatic, trachea midline, no JVD   Chest: Equal rise and fall of chest   Heart: Regular S1/S2, atrial flutter   Lungs: Unlabored breathing on room air; Clear to auscultation bilaterally, no adventitious sounds   Abdomen: Soft, non-distended, normoactive bowel sounds throughout, no tenderness to palpation in all 4 quadrants; RLQ ostomy pink with brown liquid, midline laparotomy incision is clean/dry/intact    Upper Extremities: Trace edema in hands, freely mobile bilaterally   Lower Extremities: No edema, SCDs in place on RLE; RLE warm, LLE cool with ruptured blisters from upper calf to toes   Neuro: Severe sensory and motor deficits in LLE. Absent sensation in left toes/foot  Skin: Warm except for LLE, non-diaphoretic throughout     LABS:                        10.1   13.00 )-----------( 403      ( 21 Dec 2022 02:46 )             31.1     12-21    137  |  103  |  6<L>  ----------------------------<  85  3.9   |  25  |  0.75    Ca    8.1<L>      21 Dec 2022 02:46  Phos  2.8     12-21  Mg     2.1     12-21    TPro  5.4<L>  /  Alb  2.4<L>  /  TBili  1.0  /  DBili  x   /  AST  40  /  ALT  25  /  AlkPhos  106  12-20    PT/INR - ( 20 Dec 2022 04:58 )   PT: 14.0 sec;   INR: 1.17          PTT - ( 21 Dec 2022 08:24 )  PTT:55.7 sec      RADIOLOGY & ADDITIONAL STUDIES:

## 2022-12-22 NOTE — PROGRESS NOTE ADULT - ATTENDING COMMENTS
78 YO M with a history of cocaine/opiate abuse and resident of a nursing facility who was brought to [facility] with unresponsiveness that was alleviated with narcan and found to be in rapid atrial flutter with severe LV dysfunction with mobile LV thrombus and subsegemental PE prompting transfer to [facility] on 12/7. He was also found to have limb ischemia with arterial thrombosis of LLE vessels. He was being considered for rhythm control when he developed abdominal pain and found to have acute mesenteric ischemia and underwent emergent bowel resection and SMA thrombectomy. His LV thrombus us no longer apparent on TTE and has likely embolized.    He had a prolonged open abdomen which is now closed with placement of ostomy. He was clinically improving and tolerating escalating doses of HF medical therapy but acutely worsened 12/21 in the setting of acute GI bleeding and possible infection requiring pressors. He does not have signs of decreased cardiac output at this time but will need to monitor closely. He would benefit from rhythm control when able to tolerate uninterrupted anticoagulation.    REVIEW OF STUDIES  TTE: LV 5.0 cm, LVEF 10-15% with global hypokinesis, 2.5 cm mobile LV thrombus, severe RV dysfunction  LORENZO: no ISIAH thrombus    PLAN  # Shock  - Likely hypovolemic in setting of bleeding and possible infection  -Check central venous saturations daily from central line to roughly estimate Kirt CO/CI  -Favor pan-culture in setting of worsening leukocytosis and consideration of empiric antibitoics     # Acute systolic heart failure  - Etiology is possibly tachycardia induced. EP following and plan is for eventual rhythm control. ischemic evaluation as outpatient if LVEF remains reduced  - GDMT: on hold in setting of recurrent shock   - Euvolemic/dry off diuretics, OK to administer PRBC and IVC   - Device premature at this time    # Atrial flutter  - EP following  - Continue heparin drip  - Rates borderline controlled at this time  - Tentative plan for DCCV this admission pending resolution of active issues     # Acute limb ischemia and mesenteric ischemia from LV thrombus  - management per surgery/vascular  - continue anticoagulation    # GOLDIE  - likely post-op ATN and renal infarction, resolved

## 2022-12-22 NOTE — PROGRESS NOTE ADULT - SUBJECTIVE AND OBJECTIVE BOX
***Patient transferred from CCU service back to SICU 12/22/22***    HPI/INTERVAL EVENTS:     Neurologic Medications  acetaminophen     Tablet .. 650 milliGRAM(s) Oral every 6 hours PRN Temp greater or equal to 38C (100.4F), Mild Pain (1 - 3)  fentaNYL   Patch  50 MICROgram(s)/Hr. 1 Patch Transdermal every 48 hours  HYDROmorphone  Injectable 1 milliGRAM(s) IV Push every 3 hours PRN Severe Pain (7 - 10)  HYDROmorphone  Injectable 0.5 milliGRAM(s) IV Push every 3 hours PRN Moderate Pain (4 - 6)    Respiratory Medications    Cardiovascular Medications  norepinephrine Infusion 0.05 MICROgram(s)/kG/Min IV Continuous <Continuous>    Gastrointestinal Medications  dextrose 10%. 1000 milliLiter(s) IV Continuous <Continuous>  dextrose 5%. 1000 milliLiter(s) IV Continuous <Continuous>  dextrose 5%. 1000 milliLiter(s) IV Continuous <Continuous>  loperamide 2 milliGRAM(s) Oral daily  pantoprazole  Injectable 40 milliGRAM(s) IV Push two times a day    Genitourinary Medications    Hematologic/Oncologic Medications  influenza  Vaccine (HIGH DOSE) 0.7 milliLiter(s) IntraMuscular once    Antimicrobial/Immunologic Medications    Endocrine/Metabolic Medications  dextrose 50% Injectable 25 Gram(s) IV Push once  dextrose 50% Injectable 12.5 Gram(s) IV Push once  dextrose 50% Injectable 25 Gram(s) IV Push once  dextrose Oral Gel 15 Gram(s) Oral once PRN Blood Glucose LESS THAN 70 milliGRAM(s)/deciliter  glucagon  Injectable 1 milliGRAM(s) IntraMuscular once  insulin lispro (ADMELOG) corrective regimen sliding scale   SubCutaneous every 6 hours  vasopressin Infusion 0.04 Unit(s)/Min IV Continuous <Continuous>    Topical/Other Medications  chlorhexidine 2% Cloths 1 Application(s) Topical <User Schedule>  silver sulfADIAZINE 1% Cream 1 Application(s) Topical daily      MEDICATIONS  (PRN):  acetaminophen     Tablet .. 650 milliGRAM(s) Oral every 6 hours PRN Temp greater or equal to 38C (100.4F), Mild Pain (1 - 3)  dextrose Oral Gel 15 Gram(s) Oral once PRN Blood Glucose LESS THAN 70 milliGRAM(s)/deciliter  HYDROmorphone  Injectable 1 milliGRAM(s) IV Push every 3 hours PRN Severe Pain (7 - 10)  HYDROmorphone  Injectable 0.5 milliGRAM(s) IV Push every 3 hours PRN Moderate Pain (4 - 6)      I&O's Detail    21 Dec 2022 07:01  -  22 Dec 2022 07:00  --------------------------------------------------------  IN:    dextrose 10%: 1050 mL    dextrose 5% + lactated ringers: 375 mL    Heparin: 36 mL    Heparin: 273 mL    IV PiggyBack: 187.5 mL    IV PiggyBack: 100 mL    IV PiggyBack: 100 mL    Oral Fluid: 486 mL    PRBCs (Packed Red Blood Cells): 350 mL    Sodium Chloride 0.9% Bolus: 250 mL    Vasopressin: 6 mL  Total IN: 3213.5 mL    OUT:    Ileostomy (mL): 1975 mL    Voided (mL): 2295 mL  Total OUT: 4270 mL    Total NET: -1056.5 mL      22 Dec 2022 07:01  -  22 Dec 2022 18:02  --------------------------------------------------------  IN:    dextrose 10%: 500 mL    Heparin: 13 mL    Norepinephrine: 7.2 mL    PRBCs (Packed Red Blood Cells): 350 mL    Vasopressin: 57 mL  Total IN: 927.2 mL    OUT:    Ileostomy (mL): 250 mL    Indwelling Catheter - Urethral (mL): 170 mL    Voided (mL): 60 mL  Total OUT: 480 mL    Total NET: 447.2 mL          Vital Signs Last 24 Hrs  T(C): 36.6 (22 Dec 2022 17:13), Max: 37.1 (22 Dec 2022 09:00)  T(F): 97.9 (22 Dec 2022 17:13), Max: 98.7 (22 Dec 2022 09:00)  HR: 133 (22 Dec 2022 17:00) (115 - 140)  BP: 99/67 (22 Dec 2022 15:18) (64/41 - 133/76)  BP(mean): 84 (22 Dec 2022 12:00) (48 - 101)  RR: 66 (22 Dec 2022 17:00) (9 - 109)  SpO2: 99% (22 Dec 2022 17:00) (97% - 100%)    Parameters below as of 22 Dec 2022 14:00  Patient On (Oxygen Delivery Method): room air        GENERAL: NAD, resting comfortably in bed  HEENT: NCAT, MMM  C/V: Normal rate, normal peripheral perfusion  PULM: Nonlabored breathing, no respiratory distress,   ABD: Soft, ND, NT, no rebound tenderness, no guarding  EXTREM: WWP, no edema, SCDs in place  NEURO: No focal deficits    LABS:                        8.3    20.48 )-----------( 287      ( 22 Dec 2022 15:08 )             23.7     12-22    134<L>  |  102  |  6<L>  ----------------------------<  126<H>  4.2   |  25  |  0.94    Ca    7.8<L>      22 Dec 2022 04:39  Phos  2.7     12-22  Mg     1.9     12-22    TPro  5.1<L>  /  Alb  2.0<L>  /  TBili  1.2  /  DBili  x   /  AST  27  /  ALT  17  /  AlkPhos  86  12-22    PT/INR - ( 22 Dec 2022 14:30 )   PT: 16.2 sec;   INR: 1.36          PTT - ( 22 Dec 2022 14:30 )  PTT:29.1 sec      RADIOLOGY & ADDITIONAL STUDIES:     ***Patient transferred from CCU service back to SICU 12/22/22***    HPI/INTERVAL EVENTS:   This AM  patient Hgb decreased from 10 to 7.3 patient became hypotensive (MAP 50's) requiring vasopressin and levophed. Patient had 2 episodes of melena and received 2 units PRBC's  and hep gtt held. Patient transferred back to SICU due to concern for LGIB vs intraabdominal bleeding post op. GI consulted and did not recommend any endoscopic intervention. POCUS performed at bedside demonstrated bilateral pleural effusions and free fluid in abdomen concerning for ascites.       Neurologic Medications  acetaminophen     Tablet .. 650 milliGRAM(s) Oral every 6 hours PRN Temp greater or equal to 38C (100.4F), Mild Pain (1 - 3)  fentaNYL   Patch  50 MICROgram(s)/Hr. 1 Patch Transdermal every 48 hours  HYDROmorphone  Injectable 1 milliGRAM(s) IV Push every 3 hours PRN Severe Pain (7 - 10)  HYDROmorphone  Injectable 0.5 milliGRAM(s) IV Push every 3 hours PRN Moderate Pain (4 - 6)    Respiratory Medications    Cardiovascular Medications  norepinephrine Infusion 0.05 MICROgram(s)/kG/Min IV Continuous <Continuous>    Gastrointestinal Medications  dextrose 10%. 1000 milliLiter(s) IV Continuous <Continuous>  dextrose 5%. 1000 milliLiter(s) IV Continuous <Continuous>  dextrose 5%. 1000 milliLiter(s) IV Continuous <Continuous>  loperamide 2 milliGRAM(s) Oral daily  pantoprazole  Injectable 40 milliGRAM(s) IV Push two times a day    Genitourinary Medications    Hematologic/Oncologic Medications  influenza  Vaccine (HIGH DOSE) 0.7 milliLiter(s) IntraMuscular once    Antimicrobial/Immunologic Medications    Endocrine/Metabolic Medications  dextrose 50% Injectable 25 Gram(s) IV Push once  dextrose 50% Injectable 12.5 Gram(s) IV Push once  dextrose 50% Injectable 25 Gram(s) IV Push once  dextrose Oral Gel 15 Gram(s) Oral once PRN Blood Glucose LESS THAN 70 milliGRAM(s)/deciliter  glucagon  Injectable 1 milliGRAM(s) IntraMuscular once  insulin lispro (ADMELOG) corrective regimen sliding scale   SubCutaneous every 6 hours  vasopressin Infusion 0.04 Unit(s)/Min IV Continuous <Continuous>    Topical/Other Medications  chlorhexidine 2% Cloths 1 Application(s) Topical <User Schedule>  silver sulfADIAZINE 1% Cream 1 Application(s) Topical daily      MEDICATIONS  (PRN):  acetaminophen     Tablet .. 650 milliGRAM(s) Oral every 6 hours PRN Temp greater or equal to 38C (100.4F), Mild Pain (1 - 3)  dextrose Oral Gel 15 Gram(s) Oral once PRN Blood Glucose LESS THAN 70 milliGRAM(s)/deciliter  HYDROmorphone  Injectable 1 milliGRAM(s) IV Push every 3 hours PRN Severe Pain (7 - 10)  HYDROmorphone  Injectable 0.5 milliGRAM(s) IV Push every 3 hours PRN Moderate Pain (4 - 6)      I&O's Detail    21 Dec 2022 07:01  -  22 Dec 2022 07:00  --------------------------------------------------------  IN:    dextrose 10%: 1050 mL    dextrose 5% + lactated ringers: 375 mL    Heparin: 36 mL    Heparin: 273 mL    IV PiggyBack: 187.5 mL    IV PiggyBack: 100 mL    IV PiggyBack: 100 mL    Oral Fluid: 486 mL    PRBCs (Packed Red Blood Cells): 350 mL    Sodium Chloride 0.9% Bolus: 250 mL    Vasopressin: 6 mL  Total IN: 3213.5 mL    OUT:    Ileostomy (mL): 1975 mL    Voided (mL): 2295 mL  Total OUT: 4270 mL    Total NET: -1056.5 mL      22 Dec 2022 07:01  -  22 Dec 2022 18:02  --------------------------------------------------------  IN:    dextrose 10%: 500 mL    Heparin: 13 mL    Norepinephrine: 7.2 mL    PRBCs (Packed Red Blood Cells): 350 mL    Vasopressin: 57 mL  Total IN: 927.2 mL    OUT:    Ileostomy (mL): 250 mL    Indwelling Catheter - Urethral (mL): 170 mL    Voided (mL): 60 mL  Total OUT: 480 mL    Total NET: 447.2 mL          Vital Signs Last 24 Hrs  T(C): 36.6 (22 Dec 2022 17:13), Max: 37.1 (22 Dec 2022 09:00)  T(F): 97.9 (22 Dec 2022 17:13), Max: 98.7 (22 Dec 2022 09:00)  HR: 133 (22 Dec 2022 17:00) (115 - 140)  BP: 99/67 (22 Dec 2022 15:18) (64/41 - 133/76)  BP(mean): 84 (22 Dec 2022 12:00) (48 - 101)  RR: 66 (22 Dec 2022 17:00) (9 - 109)  SpO2: 99% (22 Dec 2022 17:00) (97% - 100%)    Parameters below as of 22 Dec 2022 14:00  Patient On (Oxygen Delivery Method): room air        Physical Exam  General: Cachetic elderly male, appears stated age, responsive to questions  Neuro: Grossly intact bilaterally   HEENT: Normocephalic, atraumatic, trachea midline, no JVD   Chest: Equal rise and fall of chest   Heart: Regular S1/S2, atrial flutter   Lungs: Unlabored breathing on room air; Clear to auscultation bilaterally, no adventitious sounds   Abdomen: Soft, non-distended, normoactive bowel sounds throughout, no tenderness to palpation in all 4 quadrants; RLQ ostomy pink with brown liquid, midline laparotomy incision is clean/dry/intact    Upper Extremities: Trace edema in hands, freely mobile bilaterally   Lower Extremities: No edema, SCDs in place on RLE; RLE warm, LLE cool with ruptured blisters from upper calf to toes   Neuro: Severe sensory and motor deficits in LLE. Absent sensation in left toes/foot  Skin: Warm except for LLE, non-diaphoretic throughout     LABS:                        8.3    20.48 )-----------( 287      ( 22 Dec 2022 15:08 )             23.7     12-22    134<L>  |  102  |  6<L>  ----------------------------<  126<H>  4.2   |  25  |  0.94    Ca    7.8<L>      22 Dec 2022 04:39  Phos  2.7     12-22  Mg     1.9     12-22    TPro  5.1<L>  /  Alb  2.0<L>  /  TBili  1.2  /  DBili  x   /  AST  27  /  ALT  17  /  AlkPhos  86  12-22    PT/INR - ( 22 Dec 2022 14:30 )   PT: 16.2 sec;   INR: 1.36          PTT - ( 22 Dec 2022 14:30 )  PTT:29.1 sec      RADIOLOGY & ADDITIONAL STUDIES:  PROCEDURE DATE: 12/22/2022        INTERPRETATION: CLINICAL INFORMATION: Evaluate for acute gastrointestinal bleed.    COMPARISON: December 11, 2022    CONTRAST/COMPLICATIONS:  IV Contrast: Isovue 370 95 cc administered 5 cc discarded  Oral Contrast: NONE  Complications: None reported at time of study completion    PROCEDURE:  CT of the Abdomen and Pelvis was performed.  Precontrast, Arterial and Delayed phases were performed.  Sagittal and coronal reformats were performed.    FINDINGS:  LOWER CHEST: Increased small to moderate bilateral pleural effusions. Emphysema.  Right middle lobe probable scaring.    HEPATOBILIARY: No suspicious liver lesion. No biliary dilatation. Mild periportal edema. Unremarkable gallbladder. Portal and hepatic veins are patent.  SPLEEN: Within normal limits.  PANCREAS: Within normal limits.  ADRENALS: Within normal limits.  KIDNEYS/URETERS: Again, left upper renal infarct and few bilateral cortical cysts.    BLADDER: Intraluminal air, probably from recent intervention  REPRODUCTIVE ORGANS: Prostatomegaly with intravesical protrusion    BOWEL: Post exploratory laparoscopy with small bowel resection and right upper quadrant enteric anastomosis. No active contrast extravasation. Right hemiabdomen hematoma involving hepatic colonic flexure and proximal transverse colon, possible injury to the mesocolon, at least in part appears intramural. Hematoma contains fluid blood levels and measures 12.8 x 9.6 x 14.7 cm, resulted in mass effect to the descending portion of duodenum and inferior vena cava.  Right lower quadrant ileostomy. Large and small bowel demonstrates preserved mural enhancement.  PERITONEUM: Increased moderate volume ascites and layering hemoperitoneum.  VESSELS: Reconstitution of flow in superior mesenteric artery. Aorta iliac atherosclerosis.  RETROPERITONEUM/LYMPH NODES: No adenopathy  BONES AND SOFT TISSUES: Anasarca. No suspicious osseous lesion.    IMPRESSION:    1. Since December 11, 2022, no active contrast extravasation. Large right hemiabdomen hematoma, involving hepatic flexure and proximal transverse colon, possible mesocolon injury.  2. Increased ascites and bilateral pleural effusions. New hemoperitoneum.  3. Reconstitution of flow in superior mesenteric artery.    --- End of Report ---

## 2022-12-22 NOTE — PROGRESS NOTE ADULT - ASSESSMENT
76 y/o M with Significant PMHx of IVDU found unresponsive at his nursing home, resolved after Narcan in the field, and brought to [facility]. Found to have PE, atrial flutter, and large LV thrombus on echo. Transferred to [facility] for further management. Pt C/o abdominal pain on 12/10 CTA showing mid SMA with embolus. Abnormal distal small bowel loops and cecum with dilatation and pneumatosis suggesting infarcted bowel. One or two tiny foci of  intrahepatic portal vein pneumatosis. Segmental infarction upper pole left kidney. Now s/p Ex lap, SMA embolectomy, 80cm SBR, abthera vac left in discontinuity (12/11) and transferred to SICU intubated. S/p second look (12/13) and most recently s/p OR for 3rd look, end-to-end anastomosis of remaining bowel, loop ileostomy and abdomen closure (12/15). Remains in SICU now extubated with acute limb ischemia to LLE pending amputation and AC held given BRBPR.     Plan:   - no indication for acute vascular intervention at this time - awaiting for leg to demarcate  - Rest of care per SICU  - Vascular surgery Team 3C will continue to follow. Please call [phone] with any questions or concerns.

## 2022-12-22 NOTE — PROGRESS NOTE ADULT - SUBJECTIVE AND OBJECTIVE BOX
******INCOMPLETE******    OVERNIGHT EVENTS/INTERVAL HPI:    OBJECTIVE:  Vital Signs Last 24 Hrs  T(C): 36.3 (22 Dec 2022 11:00), Max: 37.1 (22 Dec 2022 09:00)  T(F): 97.3 (22 Dec 2022 11:00), Max: 98.7 (22 Dec 2022 09:00)  HR: 132 (22 Dec 2022 12:00) (115 - 140)  BP: 109/67 (22 Dec 2022 12:00) (64/41 - 167/67)  BP(mean): 84 (22 Dec 2022 12:00) (48 - 101)  RR: 14 (22 Dec 2022 12:00) (9 - 31)  SpO2: 100% (22 Dec 2022 12:00) (97% - 100%)    Parameters below as of 22 Dec 2022 12:00  Patient On (Oxygen Delivery Method): room air      I&O's Detail    21 Dec 2022 07:01  -  22 Dec 2022 07:00  --------------------------------------------------------  IN:    dextrose 10%: 1050 mL    dextrose 5% + lactated ringers: 375 mL    Heparin: 36 mL    Heparin: 273 mL    IV PiggyBack: 187.5 mL    IV PiggyBack: 100 mL    IV PiggyBack: 100 mL    Oral Fluid: 486 mL    PRBCs (Packed Red Blood Cells): 350 mL    Sodium Chloride 0.9% Bolus: 250 mL    Vasopressin: 6 mL  Total IN: 3213.5 mL    OUT:    Ileostomy (mL): 1975 mL    Voided (mL): 2295 mL  Total OUT: 4270 mL    Total NET: -1056.5 mL      22 Dec 2022 07:01  -  22 Dec 2022 12:34  --------------------------------------------------------  IN:    dextrose 10%: 250 mL    Heparin: 13 mL    Norepinephrine: 4.8 mL    PRBCs (Packed Red Blood Cells): 350 mL    Vasopressin: 39 mL  Total IN: 656.8 mL    OUT:    Ileostomy (mL): 125 mL    Voided (mL): 60 mL  Total OUT: 185 mL    Total NET: 471.8 mL        Physical Exam:  GENERAL: Awake, alert and interactive, no acute distress, appears comfortable  NEURO: A&Ox4, no focal deficits, 5/5 strength in all ext, reflexes 2+ throughout, CN 2-12 intact  HEENT: Normocephalic, atraumatic, no conjunctivitis or scleral icterus, oral mucosa moist, no oral lesions noted  NECK: Supple, no LAD, no JVD, thyroid not palpable  CARDIAC: Regular rate and rhythm, +S1/S2, no murmurs/rubs/gallops  PULM: Breathing comfortably on RA, clear to auscultation bilaterally, no wheezes/rales/rhonchi  ABDOMEN: Soft, nontender, nondistended, +bs, no hepatosplenomegaly, no rebound tenderness or fluid wave, no CVA tenderness  : Deferred  MSK: Range of motion grossly intact, no back tenderness  SKIN: Warm and dry, no rashes, lesions  VASC: Cap refil < 2 sec, 2+ peripheral pulses, no edema, no LE tenderness  Psych: Appropriate affect    Medications:  MEDICATIONS  (STANDING):  chlorhexidine 2% Cloths 1 Application(s) Topical <User Schedule>  dextrose 10%. 1000 milliLiter(s) (50 mL/Hr) IV Continuous <Continuous>  dextrose 5%. 1000 milliLiter(s) (50 mL/Hr) IV Continuous <Continuous>  dextrose 5%. 1000 milliLiter(s) (100 mL/Hr) IV Continuous <Continuous>  dextrose 50% Injectable 25 Gram(s) IV Push once  dextrose 50% Injectable 12.5 Gram(s) IV Push once  dextrose 50% Injectable 25 Gram(s) IV Push once  fentaNYL   Patch  50 MICROgram(s)/Hr. 1 Patch Transdermal every 48 hours  glucagon  Injectable 1 milliGRAM(s) IntraMuscular once  influenza  Vaccine (HIGH DOSE) 0.7 milliLiter(s) IntraMuscular once  insulin lispro (ADMELOG) corrective regimen sliding scale   SubCutaneous every 6 hours  loperamide 2 milliGRAM(s) Oral daily  norepinephrine Infusion 0.05 MICROgram(s)/kG/Min (6.04 mL/Hr) IV Continuous <Continuous>  pantoprazole  Injectable 40 milliGRAM(s) IV Push two times a day  silver sulfADIAZINE 1% Cream 1 Application(s) Topical daily  vasopressin Infusion 0.04 Unit(s)/Min (6 mL/Hr) IV Continuous <Continuous>    MEDICATIONS  (PRN):  acetaminophen     Tablet .. 650 milliGRAM(s) Oral every 6 hours PRN Temp greater or equal to 38C (100.4F), Mild Pain (1 - 3)  dextrose Oral Gel 15 Gram(s) Oral once PRN Blood Glucose LESS THAN 70 milliGRAM(s)/deciliter  HYDROmorphone  Injectable 1 milliGRAM(s) IV Push every 3 hours PRN Severe Pain (7 - 10)  HYDROmorphone  Injectable 0.5 milliGRAM(s) IV Push every 3 hours PRN Moderate Pain (4 - 6)      Labs:                        7.3    20.30 )-----------( 298      ( 22 Dec 2022 09:34 )             21.9     12-22    134<L>  |  102  |  6<L>  ----------------------------<  126<H>  4.2   |  25  |  0.94    Ca    7.8<L>      22 Dec 2022 04:39  Phos  2.7     12-22  Mg     1.9     12-22    TPro  5.1<L>  /  Alb  2.0<L>  /  TBili  1.2  /  DBili  x   /  AST  27  /  ALT  17  /  AlkPhos  86  12-22    PT/INR - ( 22 Dec 2022 04:39 )   PT: 16.9 sec;   INR: 1.42          PTT - ( 22 Dec 2022 04:39 )  PTT:87.7 sec      COVID-19 PCR: NotDetec (21 Dec 2022 20:23)  COVID-19 PCR: NotDetec (13 Dec 2022 07:34)  COVID-19 PCR: Negative (08 Dec 2022 18:50)  COVID-19 PCR: NotDetec (07 Dec 2022 12:11)      Radiology: Reviewed -------------TRANSFER FROM CCU TO SICU------------------  HOSPITAL COURSE:   76 yo M with history of opiate abuse who was found unresponsive at his nursing home, resolved after Narcan in the field, and brought to [facility], where he was found to have atrial flutter with RVR and PE. CTA from OSH showed subsegmental PE of RUL, for which patient was continued on a heparin gtt. For aflutter, patient was tachycardic to the 130s for which he was given digoxin 1g load and started on amiodarone gtt. EP was consulted for possible ablation vs cardioversion, however patient was deemed too high-risk. TTE showed LVEF 10-15%, 2.5x2.3cm mobile echodensity in the LV apex c/w thrombus, severely reduced RV systolic function, L pleural effusion and trivial pericardial effusion. For new HFrEF, he was continued on a nipride gtt for afterload reduction. Patient underwent LORENZO the following day which showed no thrombus in the LA/RA/ISIAH/RAA. For aflutter, he was started on amiodarone gtt. Patient underwent arterial duplex which showed 1) soft, likely acute thrombus completely occluding L popliteal artery, 2) soft likely acute thrombus incompletely occluding the left dorsalis pedis artery, 3) soft likely acute thrombus completely occluding the left mid superficial femoral artery and extending to the left popliteal artery. Patient began vomiting, yawning, diarrhea on 12/10/22, given methadone given c/f opioid withdrawal. Patient had bloody BM a/w abdominal pain. 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 On 12/11/22, Patient underwent emergent ex lap, SMA embolectomy and had 80cm of ischemic bowel resected, abdomen left open and wound vac placed. He returned to OR two more times: 12/13 for additional bowel resection and 12/15 for abdomen closure and creation of loop ileostomy. Patient was extubated on 12/17. His LLE has become ischemic and has started to demarcate below the knee, with skin changes and blistering. Vascular surgery is following and defer intervention until patient is stable from cardiac standpoint. Patient has been hemodynamically stable and pain controlled with fentanyl patch/dilaudid. He remained on a heparin gtt for due to A-flutter though not rate controlled. Patient was transferred to CCU for planned LORENZO with cardioversion following day. Overnight, pt became hypotensive (requiring vasopressin) with Hgb drop by 2.2 to 7.8, leukocytosis to 18 and melanotic stools per rectum x 2 in the AM, with associated acute abdominal pain. Heparin gtt discontinued, received 2IU PRBCs, A line placed for BP monitoring, vasopressin continued, levophed started. Patient not stable for cardioversion, transferred to SICU for acute GI bleed.     --------------------------------------------------------------  OVERNIGHT EVENTS/INTERVAL HPI: As above, pt became tachycardic (in Aflutter) to 140s, hypotensive to 60's/40s. Bedside POCUS showed CVP = 3 so pt received 250cc NS bolus. Repeat CBC showed drop in Hgb to 7.8 from 10. Vasopressin 0.04mcg/kg/min started. In AM, when A line being placed for continuous BP monitoring, pt began to c/o acute severe generalized abdominal pain and stated he was going to defecate. He produced mucousy melanotic stool per rectum. No blood noted in output from ileostomy.     SUBJECTIVE: Pt examined at bedside in AM prior to melanotic stool. He stated he felt fatigued, but denied F/C, HA, dizziness, chest pain, nausea at the time. During a line placement, he c/o new, severe sudden-onset abdominal pain with urge to defecate.       OBJECTIVE:  Vital Signs Last 24 Hrs  T(C): 36.3 (22 Dec 2022 11:00), Max: 37.1 (22 Dec 2022 09:00)  T(F): 97.3 (22 Dec 2022 11:00), Max: 98.7 (22 Dec 2022 09:00)  HR: 132 (22 Dec 2022 12:00) (115 - 140)  BP: 109/67 (22 Dec 2022 12:00) (64/41 - 167/67)  BP(mean): 84 (22 Dec 2022 12:00) (48 - 101)  RR: 14 (22 Dec 2022 12:00) (9 - 31)  SpO2: 100% (22 Dec 2022 12:00) (97% - 100%)    Parameters below as of 22 Dec 2022 12:00  Patient On (Oxygen Delivery Method): room air      I&O's Detail    21 Dec 2022 07:01  -  22 Dec 2022 07:00  --------------------------------------------------------  IN:    dextrose 10%: 1050 mL    dextrose 5% + lactated ringers: 375 mL    Heparin: 36 mL    Heparin: 273 mL    IV PiggyBack: 187.5 mL    IV PiggyBack: 100 mL    IV PiggyBack: 100 mL    Oral Fluid: 486 mL    PRBCs (Packed Red Blood Cells): 350 mL    Sodium Chloride 0.9% Bolus: 250 mL    Vasopressin: 6 mL  Total IN: 3213.5 mL    OUT:    Ileostomy (mL): 1975 mL    Voided (mL): 2295 mL  Total OUT: 4270 mL    Total NET: -1056.5 mL      22 Dec 2022 07:01  -  22 Dec 2022 12:34  --------------------------------------------------------  IN:    dextrose 10%: 250 mL    Heparin: 13 mL    Norepinephrine: 4.8 mL    PRBCs (Packed Red Blood Cells): 350 mL    Vasopressin: 39 mL  Total IN: 656.8 mL    OUT:    Ileostomy (mL): 125 mL    Voided (mL): 60 mL  Total OUT: 185 mL    Total NET: 471.8 mL        Physical Exam:  GENERAL: Awake, alert and interactive, no acute distress, appears comfortable  NEURO: A&Ox4, no focal deficits, 5/5 strength in all ext, reflexes 2+ throughout, CN 2-12 intact  HEENT: Normocephalic, atraumatic, no conjunctivitis or scleral icterus, oral mucosa moist, no oral lesions noted  NECK: Supple, no LAD, no JVD, thyroid not palpable  CARDIAC: Regular rate and rhythm, +S1/S2, no murmurs/rubs/gallops  PULM: Breathing comfortably on RA, clear to auscultation bilaterally, no wheezes/rales/rhonchi  ABDOMEN: Soft, nontender, nondistended, +bs, no hepatosplenomegaly, no rebound tenderness or fluid wave, no CVA tenderness  : Deferred  MSK: Range of motion grossly intact, no back tenderness  SKIN: Warm and dry, no rashes, lesions  VASC: Cap refil < 2 sec, 2+ peripheral pulses, no edema, no LE tenderness  Psych: Appropriate affect    Medications:  MEDICATIONS  (STANDING):  chlorhexidine 2% Cloths 1 Application(s) Topical <User Schedule>  dextrose 10%. 1000 milliLiter(s) (50 mL/Hr) IV Continuous <Continuous>  dextrose 5%. 1000 milliLiter(s) (50 mL/Hr) IV Continuous <Continuous>  dextrose 5%. 1000 milliLiter(s) (100 mL/Hr) IV Continuous <Continuous>  dextrose 50% Injectable 25 Gram(s) IV Push once  dextrose 50% Injectable 12.5 Gram(s) IV Push once  dextrose 50% Injectable 25 Gram(s) IV Push once  fentaNYL   Patch  50 MICROgram(s)/Hr. 1 Patch Transdermal every 48 hours  glucagon  Injectable 1 milliGRAM(s) IntraMuscular once  influenza  Vaccine (HIGH DOSE) 0.7 milliLiter(s) IntraMuscular once  insulin lispro (ADMELOG) corrective regimen sliding scale   SubCutaneous every 6 hours  loperamide 2 milliGRAM(s) Oral daily  norepinephrine Infusion 0.05 MICROgram(s)/kG/Min (6.04 mL/Hr) IV Continuous <Continuous>  pantoprazole  Injectable 40 milliGRAM(s) IV Push two times a day  silver sulfADIAZINE 1% Cream 1 Application(s) Topical daily  vasopressin Infusion 0.04 Unit(s)/Min (6 mL/Hr) IV Continuous <Continuous>    MEDICATIONS  (PRN):  acetaminophen     Tablet .. 650 milliGRAM(s) Oral every 6 hours PRN Temp greater or equal to 38C (100.4F), Mild Pain (1 - 3)  dextrose Oral Gel 15 Gram(s) Oral once PRN Blood Glucose LESS THAN 70 milliGRAM(s)/deciliter  HYDROmorphone  Injectable 1 milliGRAM(s) IV Push every 3 hours PRN Severe Pain (7 - 10)  HYDROmorphone  Injectable 0.5 milliGRAM(s) IV Push every 3 hours PRN Moderate Pain (4 - 6)      Labs:                        7.3    20.30 )-----------( 298      ( 22 Dec 2022 09:34 )             21.9     12-22    134<L>  |  102  |  6<L>  ----------------------------<  126<H>  4.2   |  25  |  0.94    Ca    7.8<L>      22 Dec 2022 04:39  Phos  2.7     12-22  Mg     1.9     12-22    TPro  5.1<L>  /  Alb  2.0<L>  /  TBili  1.2  /  DBili  x   /  AST  27  /  ALT  17  /  AlkPhos  86  12-22    PT/INR - ( 22 Dec 2022 04:39 )   PT: 16.9 sec;   INR: 1.42          PTT - ( 22 Dec 2022 04:39 )  PTT:87.7 sec      COVID-19 PCR: NotDetec (21 Dec 2022 20:23)  COVID-19 PCR: NotDetec (13 Dec 2022 07:34)  COVID-19 PCR: Negative (08 Dec 2022 18:50)  COVID-19 PCR: NotDetec (07 Dec 2022 12:11)      Radiology: Reviewed

## 2022-12-22 NOTE — CONSULT NOTE ADULT - ASSESSMENT
78 yo M with complicated hospital course in setting of AFlutter and HFrEF / Cardiogenic Shock, ischemic small bowel s/p multiple operations, necrotic lower extremity,    now with recurrent GI bleeding for which GI is consulted    Recommendations:  CTa for either IR or Surgical planning purposes  No plan for endoscopy at this juncture  Trend HgB and Transfuse pRBC, per primary  Trend Lactate levels    Remainder of care per primary    Thank you for allowing us to participate in the care of this patient. Please call us if you have any further questions or concerns    Kurt Zarate M.D.  Gastroenterology Fellow  Weekday Pager: [phone]  Weeknights/Weekend Coverage: Please Call the  for contact info

## 2022-12-22 NOTE — PROGRESS NOTE ADULT - ASSESSMENT
78 y/o M with Significant PMHx of IVDU found unresponsive at his nursing home, resolved after Narcan in the field, and brought to [facility]. Found to have PE, atrial flutter, and large LV thrombus on echo. Transferred to [facility] for further management. Pt C/o abdominal pain on 12/10 CTA showing mid SMA with embolus. Abnormal distal small bowel loops and cecum with dilatation and pneumatosis suggesting infarcted bowel. One or two tiny foci of  intrahepatic portal vein pneumatosis. Segmental infarction upper pole left kidney. Now s/p Ex lap, SMA embolectomy, 80cm SBR, abthera vac left in discontinuity (12/11) and transferred to SICU intubated. S/p second look (12/13) and most recently s/p OR for 3rd look, end-to-end anastomosis of remaining bowel, loop ileostomy and abdomen closure (12/15). Transferred to CCU on 12/21/22 for cardiac optimization and now transferred back to SICU 12/22/22 for bleeding hemodynamic instability. CTA performed demonstrating large hematoma in R abdomen, new hemoperitoneum, and bilateral pleural effusions.       Neuro: Pain: Fentanyl patch 50mcg q48h; IV Tylenol ATC, PRN Dilaudid.   Psych: Agitation improved--now off Precdex. Psych recs: no longer suicidal.   CV: hypovolemic shock-weaned off levo, now on vasopressin.  AFlutter s/p Amio gtt and Digoxin,; TTE (12/7) CHF- Severe RV dysfxn and LVEF 15%, holding metoprolol, Spironolactone and Valsartan. LV thrombus w LLE limb ischemia- Holding heparin gtt  and aspirin in the setting of bleeding.   Pulm: Extubated to RA on 12/17, IS,   GI:  NPO, D5LR @125/hr, PPI. Ileostomy high output--improved.   : Voids- Infarction of upper pole of Left Kidney, Renal US negative on 12/12.   ID: LLE wounds Vanc (12-19-12/22), Zosyn (12/19-12/22), Ischemic bowel: Zosyn (12/11-12/18)    Heme: Hep C positive; Active T&S  Endo: mISS  PPx: SCD, Heparin gtt (PTT goal 60-90) restarted 12/20.  Lines: PIV's, L radial A-line (12/22--), R IJ TLC (12/22--)  Dispo: SICU

## 2022-12-22 NOTE — PROGRESS NOTE ADULT - ASSESSMENT
78 y/o M with PMHx polysubstance abuse (opiates, cocaine) found unresponsive at his nursing home, resolved after Narcan in the field, and brought to [facility]. Found to have PE, atrial flutter, and large LV thrombus on echo. Transferred to [facility] for further management. Course c/b mesenteric ischemia requiring bowel resection and embolic limb ischemia, acute GI bleed.     NEURO  #Opioid use disorder  Urine tox positive for opioids. Hx snorting cocaine/heroin x many years. Denies IVDU. Does not use methadone.  - Will call psych consult if patient withdrawing    #Hx of Passive SI  After being told about likely below knee amputation, pt expressed passive SI and psych was consulted.   - Per psych, not at acute suicide risk   - Recommend trazodone 40mg qhs PRN for insomnia     CARDIOVASCULAR  #Atrial flutter  Found to have atrial flutter at [facility], HR persistently 130 with Aflutter since arrival to [facility]. S/p amio gtt.  - Plan for LORENZO with cardioversion on hold d/t acute GI bleed  - Continue tele monitoring  - Daily EKG    #Acute CHF  No prior medical hx of HF. BNP 15k and echo with LV thrombus at [facility]. 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 Currently euvolemic. TTE 12/7: severely reduced LV systolic function with EF 10-15%, severely reduced RV systolic function, left pleural effusion, trivial pericardial effusion.   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 Absent b/l LE pulses on dopplers. Arterial duplex of b/l LE showing acute thrombus occluding left popliteal artery, acute thrombus completely occluding the left mid superficial femoral artery and extending to the left popliteal artery. Vascular consulted emergently, no acute intervention until demarcated and cardiac optimized. Left LE below knee dusky, cool, no sensation to touch.   - f/u with vascular, plan for BKA once medically optimized   - fentanyl patch, dilaudid 0.5mg IVP q3h PRN for pain    #LV thrombus  Echo showing 2.5 x 2.3 cm mobile echodensity in the left ventricular apex, consistent with a thrombus.  - d/c heparin gtt i/s/o GI bleed  - eventual LORENZO to r/o L atrial appendage clot as pt was off AC for a period of time    PULMONARY  #Pulmonary embolism  CTA at [facility] showing evidence of subsegmental PE of right upper lobe. Patient was started on Heparin gtt, then transferred to [facility]. No tachypnea, patient saturating at % on room air.   - d/c heparin gtt i/s/o acute GI bleed    GASTROINTESTINAL  #GI Bleed  Became hypotensive o/n with drop in Hgb to 7.8. Received 250cc NS bolus. Melena per rectum with mucous x2. S/p 2IU PRBCs.   - CTAP GI bleed protocol  - transfer to SICU  - trend CBC and transfuse PRN  - has 3 large bore peripheral IVs (2 on RUE, 1 LUE)  - NPO, receiving Protonix 40mg IV BID  - maintain active t&s    #Mesenteric Ischemia  s/p bowel resection of 80cm ischemic bowel with loop ileostomy   - NPO  - Protonix 40mg IV BID during acute bleed (when resolved, 40mg po qd)  - monitor abdomen for signs of peritonitis   - monitor site of midline wound for signs of infection    RENAL  ELEAZAR    INFECTIOUS DISEASE  ELEAZAR    ENDOCRINE  ELEAZAR    HEME  #Leukocytosis  WBC inc from 13k to 18k. Reactive 2/2 blood loss vs infection. Abx regimen d/c yesterday. No acute signs of infection/source, however has ischemic limb. Hgb dec to 7.8 from 10 (potentially hemoconcentrated). Melena this AM. A line placed.   - s/p 2U PRBCs    PREVENTION:  F: D10 @ 50cc/hr x   E: replete PRN  N: NPO  GI: Protonix 40mg IV BID  DVT ppx: None  Dispo: CCU  Code: FULL

## 2022-12-22 NOTE — PROGRESS NOTE ADULT - ATTENDING COMMENTS
77M w/ h/o opiate abuse, found to be unresponsive at nursing home, responded to narcan  -> found to have persistent AFlut w/ RVR, brought to be [facility] where he was also found to have new acute sCHF, initially admitted to [facility] CCU on 12/7. Plan was for LORENZO/DCCV and initiation of GDMT for tachy induced CM -> However pt. found to have large LV thrombus and DCCV was deferred at the time (No LA thrombus seen on LORENZO) However, hospital course first c/b embolization to LLE w/ sx of acute limb ischemia and subsequently c/b acute SMA occlusion requiring emergent bowel resection and tx' to SICU. Pt. has subsequently had further bowel resections since w/ re-anastomosis and new ostomy placement. Yesterday evening patient was transferred to CCU service for mgmt of Aflutter and CHF, however today pt. w/ acute LGIB and hemorrhagic shock    -GI - Acute mesenteric ischemia 2/2 SMA occlusion from LV thromboembolism, while on Heparin gtt, has now had multiple bowel resections since w/ re-anastomosis and new ostomy placement. Pt. recently re-started on Heparin gtt, yesterday w/ episode of hematochezia, then this AM has had two melanotic BMs, additionally Hgb dropped from 10.1 -> 7.3 since yesterday. Pt. also developed new hypotension, elevated lactate and is now on Vasopressin and Levophed. Heparin gtt dc'd. s/p 1U PRBC w/ another unit to be given. Pt. will transfer to SICU service and will defer mgmt of acute LGIB to Surgical service  -CVS - acute systolic CHF - likely Tachy-induced CM, has not had formal ischemic w/u; TTE: LVEF 10-15%, Large LV thrombus, Severely reduced RV function, normal RV size; Plan initially for rhythm control w/ DCCV followed by initiation of GDMT. Pt. unable to go thru DCCV d/t LV thrombus and then multiple instances of embolism incl. above acute mesenteric ischemia. Despite, lack of rhythm control pt. has transitioned from Nipride gtt to Oral GDMT(Toprol 50, Valsartan 20 BID and Spironolactone). Additionally despite, low EF pt. has tolerated multiple high risk surgeries. Now given acute LGIB and hemorrhagic shock, GDMT has been dc'd, pt. started on Vasopressors and will be given PRBCs/ Clinical pt. w/ mild overload exam - predominantly in lower extremities and without pulm congestion nor significant 02 requirement (on RA). No evidence of cardiogenic shock -> new hypotension combined w/ objective data (drop in Hgb, melena, on heparin gtt) all point to hemorrhagic shock. Volume resuscitation takes precedent to pt's low EF. If evidence of volume overload w/ worsening respiratory status - would go for early intubation and continue volume resuscitation. We can diurese off volume when LGIB has resolved. Advanced HF and EP to follow.  -Pulm - Subsegmental PE - non-hypoxic. Previously on Heparin gtt, now dc'd d/t lower GIB; currently non-hypoxic  -NPO  -DVT PPx: SCDs only  -Dispo: Transfer to SICU service; Adv HF and EP following; Case d/w SICU team    Dago Mares MD  CCU Attending

## 2022-12-22 NOTE — CHART NOTE - NSCHARTNOTEFT_GEN_A_CORE
Serial Abdominal Exam @ 1800    S: Pt sleepy but arousable and responsive. Does not note any lightheadedness, chest pain, SOB, or abdominal discomfort. Denies N/V.     O:  T(C): 36.6 (12-22-22 @ 17:13), Max: 36.6 (12-22-22 @ 17:13)  T(F): 97.9 (12-22-22 @ 17:13), Max: 97.9 (12-22-22 @ 17:13)  HR: 133 (12-22-22 @ 18:00) (131 - 133)  BP: 99/67 (12-22-22 @ 15:18) (99/67 - 99/67)  RR: 53 (12-22-22 @ 18:00) (18 - 66)  SpO2: 85% (12-22-22 @ 18:00) (85% - 100%)  Wt(kg): --                        8.3    20.48 )-----------( 287      ( 22 Dec 2022 15:08 )             23.7     12-22    134<L>  |  102  |  6<L>  ----------------------------<  126<H>  4.2   |  25  |  0.94    Ca    7.8<L>      22 Dec 2022 04:39  Phos  2.7     12-22  Mg     1.9     12-22    TPro  5.1<L>  /  Alb  2.0<L>  /  TBili  1.2  /  DBili  x   /  AST  27  /  ALT  17  /  AlkPhos  86  12-22      Gen: NAD, resting comfortably in bed  C/V: persistently tachycardic to 130s - stable.  Pulm: Nonlabored breathing, no respiratory distress  Abd: soft, non-distended, non-tender to palpation, midline line ex-lap staple line intact. no rebound or guarding           Ostomy: p/p/p  Extrem: WWP, no calf edema, SCDs in place     Abdominal exam stable - no acute changes. Patient comfortable and sleeping well.  Will continue to monitor with serial abdominal exams overnight

## 2022-12-22 NOTE — CHART NOTE - NSCHARTNOTEFT_GEN_A_CORE
Admitting Diagnosis:   Patient is a 77y old  Male who presents with a chief complaint of A flutter (22 Dec 2022 18:01)      PAST MEDICAL & SURGICAL HISTORY:    Current Nutrition Order:  NPO     PO Intake: Good (%) [   ]  Fair (50-75%) [   ] Poor (<25%) [   ]  -- Prior diet order for Low fiber diet , reported pt had been with poor PO intake     GI Issues:   Ostomy: 1025ml , 1650ml , 1275    Imodium and protonix are ordered     Pain:  No Pain per flow sheets  Pain meds ordered      Skin Integrity:  Buddy 12, +Edema (pt with +1, +2, +3 Edema at this time), No pressure ulcers (+skin years noted)      Labs:       134<L>  |  102  |  6<L>  ----------------------------<  126<H>  4.2   |  25  |  0.94    Ca    7.8<L>      22 Dec 2022 04:39  Phos  2.7       Mg     1.9         TPro  5.1<L>  /  Alb  2.0<L>  /  TBili  1.2  /  DBili  x   /  AST  27  /  ALT  17  /  AlkPhos  86      CAPILLARY BLOOD GLUCOSE      POCT Blood Glucose.: 141 mg/dL (22 Dec 2022 18:14)  POCT Blood Glucose.: 144 mg/dL (22 Dec 2022 11:18)  POCT Blood Glucose.: 102 mg/dL (22 Dec 2022 00:44)      Medications:  MEDICATIONS  (STANDING):  chlorhexidine 2% Cloths 1 Application(s) Topical <User Schedule>  dextrose 10%. 1000 milliLiter(s) (50 mL/Hr) IV Continuous <Continuous>  dextrose 5%. 1000 milliLiter(s) (50 mL/Hr) IV Continuous <Continuous>  dextrose 5%. 1000 milliLiter(s) (100 mL/Hr) IV Continuous <Continuous>  dextrose 50% Injectable 25 Gram(s) IV Push once  dextrose 50% Injectable 12.5 Gram(s) IV Push once  dextrose 50% Injectable 25 Gram(s) IV Push once  fentaNYL   Patch  50 MICROgram(s)/Hr. 1 Patch Transdermal every 48 hours  glucagon  Injectable 1 milliGRAM(s) IntraMuscular once  influenza  Vaccine (HIGH DOSE) 0.7 milliLiter(s) IntraMuscular once  insulin lispro (ADMELOG) corrective regimen sliding scale   SubCutaneous every 6 hours  loperamide 2 milliGRAM(s) Oral daily  norepinephrine Infusion 0.05 MICROgram(s)/kG/Min (6.04 mL/Hr) IV Continuous <Continuous>  pantoprazole  Injectable 40 milliGRAM(s) IV Push two times a day  silver sulfADIAZINE 1% Cream 1 Application(s) Topical daily  vasopressin Infusion 0.04 Unit(s)/Min (6 mL/Hr) IV Continuous <Continuous>    MEDICATIONS  (PRN):  acetaminophen     Tablet .. 650 milliGRAM(s) Oral every 6 hours PRN Temp greater or equal to 38C (100.4F), Mild Pain (1 - 3)  dextrose Oral Gel 15 Gram(s) Oral once PRN Blood Glucose LESS THAN 70 milliGRAM(s)/deciliter  HYDROmorphone  Injectable 1 milliGRAM(s) IV Push every 3 hours PRN Severe Pain (7 - 10)  HYDROmorphone  Injectable 0.5 milliGRAM(s) IV Push every 3 hours PRN Moderate Pain (4 - 6)      6'6''  pounds +-10%  Wt 142 pounds BMI 16.4 %IBW=66%     Weight Change:    141.9 pounds - dosing wt    136 pounds - Bedscale wt     **PCM:  >> Reports having 1-2 meals/day + ONS. Per pt claims to have 2 ensure/day and 2 nutrament/day - unclear how accurate this is. ?If pt meeting ~75% EER consistently.  >> Does report wt loss.  pounds x6 months ago. Thinks he now is 135 pounds which is consistent with bedscale wt obtained during initial visit by  pounds. Suggest wt loss of 49 pounds/26% body wt loss.  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     Estimated energy needs:  Current body wt for EER based on clinician judgment   Adjust for age, malnutrition, EF, S/p OR; fluids per team  25-30kcal/k-1950kcal/day   1.2-1.4gm/k-91gm prot/day     Subjective:  78 yo M with history of opiate abuse who was found unresponsive at his nursing home, resolved after Narcan in the field, and brought to [facility], where he was found to have atrial flutter with RVR and PE. CTA from OSH showed subsegmental PE of RUL, for which patient was continued on a heparin gtt. For aflutter, patient was tachycardic to the 130s for which he was given digoxin 1g load and started on amiodarone gtt. EP was consulted for possible ablation vs cardioversion, however patient was deemed too high-risk. TTE showed LVEF 10-15%, 2.5x2.3cm mobile echodensity in the LV apex c/w thrombus, severely reduced RV systolic function, L pleural effusion and trivial pericardial effusion. For new HFrEF, he was continued on a nipride gtt for afterload reduction. Patient underwent LORENZO the following day which showed no thrombus in the LA/RA/ISIAH/RAA. For aflutter, he was started on amiodarone gtt. Patient underwent arterial duplex which showed 1) soft, likely acute thrombus completely occluding L popliteal artery, 2) soft likely acute thrombus incompletely occluding the left dorsalis pedis artery, 3) soft likely acute thrombus completely occluding the left mid superficial femoral artery and extending to the left popliteal artery. Patient began vomiting, yawning, diarrhea on 12/10/22, given methadone given c/f opioid withdrawal. Patient had bloody BM a/w abdominal pain. 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 On 22, Patient underwent emergent ex lap, SMA embolectomy and had 80cm of ischemic bowel resected, abdomen left open and wound vac placed. He returned to OR two more times:  for additional bowel resection and 12/15 for abdomen closure and creation of loop ileostomy. Patient was extubated on . His LLE has become ischemic and has started to demarcate below the knee, with skin changes and blistering. Vascular surgery is following and defer intervention until patient is stable from cardiac standpoint. Patient has been hemodynamically stable and pain controlled with fentanyl patch/dilaudid. He remained on a heparin gtt for due to A-flutter though not rate controlled. Patient was transferred to CCU for planned LORENZO with cardioversion following day. Overnight, pt became hypotensive (requiring vasopressin) with Hgb drop by 2.2 to 7.8, leukocytosis to 18 and melanotic stools per rectum x 2 in the AM, with associated acute abdominal pain. Heparin gtt discontinued, received 2IU PRBCs, A line placed for BP monitoring, vasopressin continued, levophed started. Patient not stable for cardioversion, transferred to SICU for acute GI bleed. In AM, when A line being placed for continuous BP monitoring, pt began to c/o acute severe generalized abdominal pain and stated he was going to defecate. He produced mucousy melanotic stool per rectum. No blood noted in output from ileostomy. CT A/P obtained with no active contrast extravasation. Large right hemiabdomen hematoma, involving hepatic flexure and proximal transverse colon, possible mesocolon injury. Increased ascites and bilateral pleural effusions. New hemoperitoneum. Hematoma without signs of active bleeding. If any signs of bowel ischemia, further hemodynamic instability nonresponsive to fluid/blood, changes in abdominal pain or exam, will undergo exploratory laparotomy and evacuation of hemoperitoneum, likely bowel resection or hematoma evacuation.     Pt seen on 5EAST, Under SICU team-spoke with RN/team. Pt seen sleeping. MAP 98. D10% ordered. Ordered for norepinephrine Infusion, vasopressin. Pt is NPO at this time. RN reports Pt Pending OR.   Labs: POCT 141 144, Lactate 2.0, Mg 1.9, Phos 2.7, Na 134, K 4.2, BUN 6, Cr 0.94, , Ca 7.8,  Please see below for nutritions recommendations. D/w team.    Prior PES: Malnutrition Severe in Chronic state RT presumed intake<EER AEB NFPE, wt loss, PO intake  >>on going  Goal: Pt will meet at least 75% of nutrient needs via feasible route / Show no further s/s of malnutrition    Recommendations:  1. Would strongly consider use of alternate means of nutrition given prolonged inadequate diet in malnourished who is now with concern for GIB.  >> TPN: 258g Dex, 90g AA, 50g 20% Lipids to provide in total 1737Kcal, 90g protein, 2,8 GIR (based on 65kg), 1.4g/kg CBW protein. Recommend checking TG before starting TPN and then check TG weekly. Check Mg, Phos, K daily and POC BG Q6hrs. Replete Lytes PRN. Trend daily weights. Fluids and lytes per MD discretion. Start at 100g Dex on Day 1, 200g Dex on Day 2, and advance to goal of g Dex on Day 3.   2. When/if medically feasible adv diet. Begin with clears -> full liquids -> soft low fiber, low sodium diet + ensure MAX x3/day 150kcal/30gm prot per 1 can. Of note if diet able to be adv pt likely to have poor PO intake.   3. Pain/GI per team.  4. Monitor Skin, Wts daily, GOC.  5. Labs: monitor BMP, CBC, glucose, lytes, trend renal indices, LFts, POCT.  6. RD to remain avaiable for additional Recs.      Education: NA    Risk Level: High [X   ] Moderate [   ] Low [   ].

## 2022-12-22 NOTE — CHART NOTE - NSCHARTNOTEFT_GEN_A_CORE
Patient seen and examined at bedside with Dr. Solomon. He is currently afebrile, HR 120s, /72, sat well on NC. Remains on vasopressin currently at @ 0.02 mcg and has decreased throughout the day. Urine output 125 and 45cc in the past 2 hrs. AAOX3. Denies abdominal pain, denies N/V/chest pain/dyspnea. Denies diarrhea, denies further blood per rectum since this am.  On physical exam abdomen soft, nondistended, moderate LLQ tenderness unchanged from previous exam, no rebound, no guarding. Ostomy functioning well with 250 output during present shift. Will continue serial abdominal exams and follow up repeat labs at 7pm.

## 2022-12-22 NOTE — PROGRESS NOTE ADULT - SUBJECTIVE AND OBJECTIVE BOX
Examined this morning during morning rounds. Tachycardic to 136, BP 60/40. Per nursing, had a melenic bowel movement per rectum overnight but not significant volume and no active BRBPR. Mentating and responding to questions but lethargic. Mild diffuse abdominal pain. Abdomen soft, nontender, nondistended, ostomy pink and patent with stool and gas in the bag. No peripheral edema. CBC 19, Hgb 7.9 from 10.1 yesterday am. In 24 hours, in 3L, out 4L. 1.9 from the ostomy. Unlikely LGIB with enough clinical significance to cause this degree of hypotension. CCU team at bedside, discussed possibility of cardiac etiology. 250cc bolus and 1U pRBC per cardiology team. Started vasopressin. Stat CTA. Surgery Team 4 will continue to follow as primary. Discussed with SICU team, CCU team and attending surgeon.  Examined this morning during morning rounds. Tachycardic to 136, BP 60/40. Per nursing, had a melenic bowel movement per rectum overnight but not significant volume and no active BRBPR. Mentating and responding to questions but lethargic. Mild diffuse abdominal pain. Abdomen soft, nontender, nondistended, ostomy pink and patent with stool and gas in the bag. No peripheral edema. CBC 19, Hgb 7.9 from 10.1 yesterday am. In 24 hours, in 3L, out 4L. 1.9 from the ostomy. LGIB but unlikely with enough clinical significance to cause this degree of hypotension but given 2 point drop in Hgb and recent heparin drip started, will transfuse and obtain stat CTA to evaluate. Hold heparin drip given concern for bleeding outweighing benefits. CCU team at bedside, discussed possibility of cardiac etiology. 250cc bolus and 1U pRBC per cardiology team. Started vasopressin. Surgery Team 4 will continue to follow as primary. Discussed with SICU team, CCU team, CCU attending and attending surgeon. Examined this morning during morning rounds. Tachycardic to 136, BP 60/40. Per nursing, had a melenic bowel movement per rectum overnight but not significant volume and no active BRBPR. Mentating and responding to questions but lethargic. Mild diffuse abdominal pain. Abdomen soft, nontender, nondistended, ostomy pink and patent with stool and gas in the bag. No peripheral edema. CBC 19, Hgb 7.9 from 10.1 yesterday am. In 24 hours, in 3L, out 4L. 1.9 from the ostomy. LGIB. Given 2 point drop in Hgb and recent heparin drip started in the setting of poor cardiac function and decreased reserve, transfuse and obtain stat CTA. Hold heparin drip given concern for bleeding outweighing benefits. CCU team at bedside, discussed possibility of cardiac etiology. 250cc bolus and 1U pRBC per cardiology team. Started vasopressin. Surgery Team 4 will continue to follow as primary. Discussed with SICU team, CCU team, CCU attending and attending surgeon.

## 2022-12-22 NOTE — PROGRESS NOTE ADULT - ASSESSMENT
76 y/o M with no significant PMHx found unresponsive at his nursing home, resolved after narcan in the field, and brought to [facility]. Found to have LV dysfunction (Tachy mediated), sub-segmental PE, atrial flutter, and large LV thrombus on echo. Transferred to [facility] for further management. Course complicated by acute mesenteric ischemia now s/p embolectomy and bowel resection, septic shock, LLE arterial occlusion.  he was recovering well, tolerating the introduction of GDMT and plan was for cardioversion was delayed as he continues to have GI bleeding. GDMT was discontinued and now receiving transfusions and supported with vasopressors.     #HFrEF   EF 10-15%, LVIDD 5. Severely reduced RV function.   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    #Atrial Flutter   - rates improved with increasing metoprolol, now rapid i/s/o GI bleed  - AC held for bleeding  - EP following, LORENZO/DCCV delayed    #Acute mesenteric ischemia and limb ischemia 2/2 LV thrombus emobolization   Now s/p Exlap Embolectomy of SMA and resection of 80cm of SB  - care per surgery/vascular

## 2022-12-22 NOTE — PROGRESS NOTE ADULT - SUBJECTIVE AND OBJECTIVE BOX
Subjective:  had a large melanotic and bright blood overnight, went into shock, HF therapy discontinued, given 2U pRBC and started on levophed/vaso  this mornig he was comfortable, on room air, no resp compaints.       Medications:  acetaminophen     Tablet .. 650 milliGRAM(s) Oral every 6 hours PRN  chlorhexidine 2% Cloths 1 Application(s) Topical <User Schedule>  dextrose 10%. 1000 milliLiter(s) IV Continuous <Continuous>  dextrose 5%. 1000 milliLiter(s) IV Continuous <Continuous>  dextrose 5%. 1000 milliLiter(s) IV Continuous <Continuous>  dextrose 50% Injectable 25 Gram(s) IV Push once  dextrose 50% Injectable 12.5 Gram(s) IV Push once  dextrose 50% Injectable 25 Gram(s) IV Push once  dextrose Oral Gel 15 Gram(s) Oral once PRN  fentaNYL   Patch  50 MICROgram(s)/Hr. 1 Patch Transdermal every 48 hours  glucagon  Injectable 1 milliGRAM(s) IntraMuscular once  HYDROmorphone  Injectable 1 milliGRAM(s) IV Push every 3 hours PRN  HYDROmorphone  Injectable 0.5 milliGRAM(s) IV Push every 3 hours PRN  influenza  Vaccine (HIGH DOSE) 0.7 milliLiter(s) IntraMuscular once  insulin lispro (ADMELOG) corrective regimen sliding scale   SubCutaneous every 6 hours  loperamide 2 milliGRAM(s) Oral daily  norepinephrine Infusion 0.05 MICROgram(s)/kG/Min IV Continuous <Continuous>  pantoprazole  Injectable 40 milliGRAM(s) IV Push two times a day  silver sulfADIAZINE 1% Cream 1 Application(s) Topical daily  vasopressin Infusion 0.04 Unit(s)/Min IV Continuous <Continuous>      Physical Exam:    Vitals:  Vital Signs Last 24 Hours  T(C): 36.3 (12-22-22 @ 11:00), Max: 37.1 (12-22-22 @ 09:00)  HR: 132 (12-22-22 @ 12:00) (115 - 140)  BP: 109/67 (12-22-22 @ 12:00) (64/41 - 133/76)  RR: 14 (12-22-22 @ 12:00) (9 - 31)  SpO2: 100% (12-22-22 @ 12:00) (97% - 100%)        I&O's Summary    21 Dec 2022 07:01  -  22 Dec 2022 07:00  --------------------------------------------------------  IN: 3213.5 mL / OUT: 4270 mL / NET: -1056.5 mL    22 Dec 2022 07:01  -  22 Dec 2022 14:10  --------------------------------------------------------  IN: 656.8 mL / OUT: 185 mL / NET: 471.8 mL        Tele:    NAD  Neck supple , JVP ~5cm  Heart irreg, s1s2 no m   [facility]  Labs:                        7.3    20.30 )-----------( 298      ( 22 Dec 2022 09:34 )             21.9     12-22    134<L>  |  102  |  6<L>  ----------------------------<  126<H>  4.2   |  25  |  0.94    Ca    7.8<L>      22 Dec 2022 04:39  Phos  2.7     12-22  Mg     1.9     12-22    TPro  5.1<L>  /  Alb  2.0<L>  /  TBili  1.2  /  DBili  x   /  AST  27  /  ALT  17  /  AlkPhos  86  12-22    PT/INR - ( 22 Dec 2022 04:39 )   PT: 16.9 sec;   INR: 1.42          PTT - ( 22 Dec 2022 04:39 )  PTT:87.7 sec      Creatine Kinase, Serum: 948 U/L (12-20-22 @ 04:58)  Creatine Kinase, Serum: 948 U/L (12-20-22 @ 04:58)      Lactate Dehydrogenase, Serum: 300 U/L (12-22 @ 04:39)    Lactate, Blood: 2.2 mmol/L (12-22 @ 04:39)     Subjective:  had a large melanotic and bright blood overnight, went into shock, HF therapy discontinued, given 2U pRBC and started on levophed/vaso  this mornig he was comfortable, on room air, no resp compaints.     Medications:  acetaminophen     Tablet .. 650 milliGRAM(s) Oral every 6 hours PRN  chlorhexidine 2% Cloths 1 Application(s) Topical <User Schedule>  dextrose 10%. 1000 milliLiter(s) IV Continuous <Continuous>  dextrose 5%. 1000 milliLiter(s) IV Continuous <Continuous>  dextrose 5%. 1000 milliLiter(s) IV Continuous <Continuous>  dextrose 50% Injectable 25 Gram(s) IV Push once  dextrose 50% Injectable 12.5 Gram(s) IV Push once  dextrose 50% Injectable 25 Gram(s) IV Push once  dextrose Oral Gel 15 Gram(s) Oral once PRN  fentaNYL   Patch  50 MICROgram(s)/Hr. 1 Patch Transdermal every 48 hours  glucagon  Injectable 1 milliGRAM(s) IntraMuscular once  HYDROmorphone  Injectable 1 milliGRAM(s) IV Push every 3 hours PRN  HYDROmorphone  Injectable 0.5 milliGRAM(s) IV Push every 3 hours PRN  influenza  Vaccine (HIGH DOSE) 0.7 milliLiter(s) IntraMuscular once  insulin lispro (ADMELOG) corrective regimen sliding scale   SubCutaneous every 6 hours  loperamide 2 milliGRAM(s) Oral daily  norepinephrine Infusion 0.05 MICROgram(s)/kG/Min IV Continuous <Continuous>  pantoprazole  Injectable 40 milliGRAM(s) IV Push two times a day  silver sulfADIAZINE 1% Cream 1 Application(s) Topical daily  vasopressin Infusion 0.04 Unit(s)/Min IV Continuous <Continuous>      Physical Exam:    Vitals:  Vital Signs Last 24 Hours  T(C): 36.3 (12-22-22 @ 11:00), Max: 37.1 (12-22-22 @ 09:00)  HR: 132 (12-22-22 @ 12:00) (115 - 140)  BP: 109/67 (12-22-22 @ 12:00) (64/41 - 133/76)  RR: 14 (12-22-22 @ 12:00) (9 - 31)  SpO2: 100% (12-22-22 @ 12:00) (97% - 100%)        I&O's Summary    21 Dec 2022 07:01  -  22 Dec 2022 07:00  --------------------------------------------------------  IN: 3213.5 mL / OUT: 4270 mL / NET: -1056.5 mL    22 Dec 2022 07:01  -  22 Dec 2022 14:10  --------------------------------------------------------  IN: 656.8 mL / OUT: 185 mL / NET: 471.8 mL        Tele:    NAD  Neck supple , JVP ~5cm  Heart irreg, s1s2 no m   Lungs clear b/l  abd distended, mild TTP  Ext RLE luke warm, warm upper extremities, LLE cool, 2+ BLLE pitting edema       Labs:                        7.3    20.30 )-----------( 298      ( 22 Dec 2022 09:34 )             21.9     12-22    134<L>  |  102  |  6<L>  ----------------------------<  126<H>  4.2   |  25  |  0.94    Ca    7.8<L>      22 Dec 2022 04:39  Phos  2.7     12-22  Mg     1.9     12-22    TPro  5.1<L>  /  Alb  2.0<L>  /  TBili  1.2  /  DBili  x   /  AST  27  /  ALT  17  /  AlkPhos  86  12-22    PT/INR - ( 22 Dec 2022 04:39 )   PT: 16.9 sec;   INR: 1.42          PTT - ( 22 Dec 2022 04:39 )  PTT:87.7 sec      Creatine Kinase, Serum: 948 U/L (12-20-22 @ 04:58)  Creatine Kinase, Serum: 948 U/L (12-20-22 @ 04:58)      Lactate Dehydrogenase, Serum: 300 U/L (12-22 @ 04:39)    Lactate, Blood: 2.2 mmol/L (12-22 @ 04:39)

## 2022-12-22 NOTE — PROGRESS NOTE ADULT - ASSESSMENT
78yo M with no significant PMH/PSx admitted to cardiology service on 12/7 in the setting of severely reduced LV function 2/2 an LV thrombus. Pt now with several days of post-prandial abdominal pain and loose BMs, initially suspected to be 2/2 opioid withdrawal, however on evening of 12/10 had a large bloody BM. 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 Vascular surgery (already following) with plan for OR. General surgery consulted for possible operative assistance in the setting of bowel ischemia. Now POD2 s/p ex lap, SMA embolectomy and small bowel resection. Plan to RTOR today for second look.    Plan:    -Trend CBC  -Trend lactate  -Monitor HD status  -Wean off pressors  -Holding heparin due to new hemoperitoneum findings  -Maintain active t&s, and coags  -Transfuse as needed  -Serial abdominal exams  -NPO  -IVF  -Rest of care per SICU  -Surgery Team 4C will continue to follow. Please page Team 4 with questions/clinical changes. [phone]      SURGERY CHIEF RESIDENT NOTE    Not complaining of abdominal pain. No nausea or vomiting. Weaning vasopressin; levophed off. Abdomen soft, nontender, nondistended. Two episodes of melena this morning; none in the last 4-6 hours. Hgb 8.3 after 2U pRBC this morning from 7.3. CT A/P obtained with no active contrast extravasation. Large right hemiabdomen hematoma, involving hepatic flexure and proximal transverse colon, possible mesocolon injury. Increased ascites and bilateral pleural effusions. New hemoperitoneum. Hematoma without signs of active bleeding. Hemodynamically improving with fluid and blood transfusions. Will continue to closely monitor tonight given responsiveness. CBC and lactate next at 7pm. If any signs of bowel ischemia, further hemodynamic instability nonresponsive to fluid/blood, changes in abdominal pain or exam, will undergo exploratory laparotomy and evacuation of hemoperitoneum, likely bowel resection or hematoma evacuation. Discussed at bedside with SICU team and attending surgeon.

## 2022-12-22 NOTE — PROGRESS NOTE ADULT - SUBJECTIVE AND OBJECTIVE BOX
SUBJECTIVE: Pt seen and examined at bedside. He had an acute drop in Hb this AM, responded appropriately to 1 unit of blood. CTA showed large right hemiabdomen hematoma, involving hepatic flexure and proximal transverse colon, possible mesocolon injury. On small dose of pressors      MEDICATIONS  (STANDING):  chlorhexidine 2% Cloths 1 Application(s) Topical <User Schedule>  dextrose 10%. 1000 milliLiter(s) (50 mL/Hr) IV Continuous <Continuous>  dextrose 5%. 1000 milliLiter(s) (50 mL/Hr) IV Continuous <Continuous>  dextrose 5%. 1000 milliLiter(s) (100 mL/Hr) IV Continuous <Continuous>  dextrose 50% Injectable 25 Gram(s) IV Push once  dextrose 50% Injectable 12.5 Gram(s) IV Push once  dextrose 50% Injectable 25 Gram(s) IV Push once  fentaNYL   Patch  50 MICROgram(s)/Hr. 1 Patch Transdermal every 48 hours  glucagon  Injectable 1 milliGRAM(s) IntraMuscular once  influenza  Vaccine (HIGH DOSE) 0.7 milliLiter(s) IntraMuscular once  insulin lispro (ADMELOG) corrective regimen sliding scale   SubCutaneous every 6 hours  loperamide 2 milliGRAM(s) Oral daily  norepinephrine Infusion 0.05 MICROgram(s)/kG/Min (6.04 mL/Hr) IV Continuous <Continuous>  pantoprazole  Injectable 40 milliGRAM(s) IV Push two times a day  silver sulfADIAZINE 1% Cream 1 Application(s) Topical daily  vasopressin Infusion 0.04 Unit(s)/Min (6 mL/Hr) IV Continuous <Continuous>    MEDICATIONS  (PRN):  acetaminophen     Tablet .. 650 milliGRAM(s) Oral every 6 hours PRN Temp greater or equal to 38C (100.4F), Mild Pain (1 - 3)  dextrose Oral Gel 15 Gram(s) Oral once PRN Blood Glucose LESS THAN 70 milliGRAM(s)/deciliter  HYDROmorphone  Injectable 1 milliGRAM(s) IV Push every 3 hours PRN Severe Pain (7 - 10)  HYDROmorphone  Injectable 0.5 milliGRAM(s) IV Push every 3 hours PRN Moderate Pain (4 - 6)      Vital Signs Last 24 Hrs  T(C): 36.3 (22 Dec 2022 11:00), Max: 37.1 (22 Dec 2022 09:00)  T(F): 97.3 (22 Dec 2022 11:00), Max: 98.7 (22 Dec 2022 09:00)  HR: 133 (22 Dec 2022 16:00) (115 - 140)  BP: 99/67 (22 Dec 2022 15:18) (64/41 - 133/76)  BP(mean): 84 (22 Dec 2022 12:00) (48 - 101)  RR: 18 (22 Dec 2022 16:00) (9 - 109)  SpO2: 98% (22 Dec 2022 16:00) (97% - 100%)    Parameters below as of 22 Dec 2022 14:00  Patient On (Oxygen Delivery Method): room air        Physical Exam  General: NAD,   Pulmonary: Nonlabored breathing,   CV: NSR  Abd: soft, ileostomy pink with red rubber in place, ostomy bag with liquid output (no blood),  Extremities: minimal edema, left calf/foot cool, right calf/foot warm, well-perfused, no L DP/PT signals, R PT mono, no R DP, patchy discoloration of the left calf, calf blisters have popped, decreased sensation in L foot and anterior portion of calf. toes necrotic      I&O's Detail    21 Dec 2022 07:01  -  22 Dec 2022 07:00  --------------------------------------------------------  IN:    dextrose 10%: 1050 mL    dextrose 5% + lactated ringers: 375 mL    Heparin: 36 mL    Heparin: 273 mL    IV PiggyBack: 187.5 mL    IV PiggyBack: 100 mL    IV PiggyBack: 100 mL    Oral Fluid: 486 mL    PRBCs (Packed Red Blood Cells): 350 mL    Sodium Chloride 0.9% Bolus: 250 mL    Vasopressin: 6 mL  Total IN: 3213.5 mL    OUT:    Ileostomy (mL): 1975 mL    Voided (mL): 2295 mL  Total OUT: 4270 mL    Total NET: -1056.5 mL      22 Dec 2022 07:01  -  22 Dec 2022 16:21  --------------------------------------------------------  IN:    dextrose 10%: 250 mL    Heparin: 13 mL    Norepinephrine: 4.8 mL    PRBCs (Packed Red Blood Cells): 350 mL    Vasopressin: 39 mL  Total IN: 656.8 mL    OUT:    Ileostomy (mL): 125 mL    Voided (mL): 60 mL  Total OUT: 185 mL    Total NET: 471.8 mL          LABS:                        8.3    20.48 )-----------( 287      ( 22 Dec 2022 15:08 )             23.7     12-22    134<L>  |  102  |  6<L>  ----------------------------<  126<H>  4.2   |  25  |  0.94    Ca    7.8<L>      22 Dec 2022 04:39  Phos  2.7     12-22  Mg     1.9     12-22    TPro  5.1<L>  /  Alb  2.0<L>  /  TBili  1.2  /  DBili  x   /  AST  27  /  ALT  17  /  AlkPhos  86  12-22    PT/INR - ( 22 Dec 2022 14:30 )   PT: 16.2 sec;   INR: 1.36          PTT - ( 22 Dec 2022 14:30 )  PTT:29.1 sec      RADIOLOGY & ADDITIONAL STUDIES:

## 2022-12-22 NOTE — PROGRESS NOTE ADULT - SUBJECTIVE AND OBJECTIVE BOX
SURGERY CHIEF RESIDENT NOTE    Not complaining of abdominal pain. No nausea or vomiting. Weaning vasopressin; levophed off. Abdomen soft, nontender, nondistended. Two episodes of melena this morning; none in the last 4-6 hours. Hgb 8.3 after 2U pRBC this morning from 7.3. CT A/P obtained with no active contrast extravasation. Large right hemiabdomen hematoma, involving hepatic flexure and proximal transverse colon, possible mesocolon injury. Increased ascites and bilateral pleural effusions. New hemoperitoneum. Hematoma without signs of active bleeding. Hemodynamically improving with fluid and blood transfusions. Will continue to closely monitor tonight given responsiveness. CBC and lactate next at 7pm. If any signs of bowel ischemia, further hemodynamic instability nonresponsive to fluid/blood, changes in abdominal pain or exam, will undergo exploratory laparotomy and evacuation of hemoperitoneum, likely bowel resection or hematoma evacuation. Discussed at bedside with SICU team and attending surgeon. 12/22: Hgb decreasd to 6 and hypotensive in am  GIven 1UPRBC repeat 7.3 given another UPRBC. Repeat CBC@3 8.3 Uriarte placed. Repeat Again to 7pm with LA and cbc. then another ordered for MN.   O/n: NPO after MN, Covid swab sent, negative. At 4AM, HR 140s, BP 66/40 on L upper. 78/51 on R upper, patient mentating and asymptomatic. Bedside POCUS estimated RA 3 given IVC collapse. Likely due to additional volume loss from ostomy (1.5L). Ordered 250cc NS bolus and IV tylenol 1g for generalized pain (stomach and leg). Lactate 2.2, however only slightly elevated. AM Hgb 7.8 (baseline post-op ~10s), no obvious sites of bleeding. Ordered repeat Hgb and 1U pRBC. Verbal consent placed in chart. No signs of bleeding. Added LDH/Hapto. Soft BP again 75/51, started vaso 0.04     SUBJECTIVE: Patient seen and examined bedside; patient found to be tachycardic to 140's, with SBP 50-70's during am rounds, AAOx3, no c/o, patient had 2 episodes of melenic bowel movements prior to this event, transiently responded to fluids; 1uPRBC administered, started on vaso and levophed    norepinephrine Infusion 0.05 MICROgram(s)/kG/Min IV Continuous <Continuous>      Vital Signs Last 24 Hrs  T(C): 36.3 (22 Dec 2022 11:00), Max: 37.1 (22 Dec 2022 09:00)  T(F): 97.3 (22 Dec 2022 11:00), Max: 98.7 (22 Dec 2022 09:00)  HR: 133 (22 Dec 2022 16:00) (115 - 140)  BP: 99/67 (22 Dec 2022 15:18) (64/41 - 133/76)  BP(mean): 84 (22 Dec 2022 12:00) (48 - 101)  RR: 18 (22 Dec 2022 16:00) (9 - 109)  SpO2: 98% (22 Dec 2022 16:00) (97% - 100%)    Parameters below as of 22 Dec 2022 14:00  Patient On (Oxygen Delivery Method): room air      I&O's Detail    21 Dec 2022 07:01  -  22 Dec 2022 07:00  --------------------------------------------------------  IN:    dextrose 10%: 1050 mL    dextrose 5% + lactated ringers: 375 mL    Heparin: 36 mL    Heparin: 273 mL    IV PiggyBack: 187.5 mL    IV PiggyBack: 100 mL    IV PiggyBack: 100 mL    Oral Fluid: 486 mL    PRBCs (Packed Red Blood Cells): 350 mL    Sodium Chloride 0.9% Bolus: 250 mL    Vasopressin: 6 mL  Total IN: 3213.5 mL    OUT:    Ileostomy (mL): 1975 mL    Voided (mL): 2295 mL  Total OUT: 4270 mL    Total NET: -1056.5 mL      22 Dec 2022 07:01  -  22 Dec 2022 17:07  --------------------------------------------------------  IN:    dextrose 10%: 500 mL    Heparin: 13 mL    Norepinephrine: 7.2 mL    PRBCs (Packed Red Blood Cells): 350 mL    Vasopressin: 57 mL  Total IN: 927.2 mL    OUT:    Ileostomy (mL): 250 mL    Indwelling Catheter - Urethral (mL): 125 mL    Voided (mL): 60 mL  Total OUT: 435 mL    Total NET: 492.2 mL      PE:    General: NAD, resting comfortably in bed  C/V: S1 s2, RRR  Pulm: Nonlabored breathing, no respiratory distress  Abd: Soft, NTND  Extrem: [facility]        LABS:                        8.3    20.48 )-----------( 287      ( 22 Dec 2022 15:08 )             23.7     12-22    134<L>  |  102  |  6<L>  ----------------------------<  126<H>  4.2   |  25  |  0.94    Ca    7.8<L>      22 Dec 2022 04:39  Phos  2.7     12-22  Mg     1.9     12-22    TPro  5.1<L>  /  Alb  2.0<L>  /  TBili  1.2  /  DBili  x   /  AST  27  /  ALT  17  /  AlkPhos  86  12-22    PT/INR - ( 22 Dec 2022 14:30 )   PT: 16.2 sec;   INR: 1.36          PTT - ( 22 Dec 2022 14:30 )  PTT:29.1 sec      RADIOLOGY & ADDITIONAL STUDIES:

## 2022-12-22 NOTE — CONSULT NOTE ADULT - SUBJECTIVE AND OBJECTIVE BOX
GASTROENTEROLOGY CONSULT NOTE  HPI:  76 y/o male with no significant past medical history BIBA from [facility] due to unresponsiveness. Patient was reportedly found unresponsive at his nursing home, Narcan was given in the field with resolution, and patient was taken to [facility]. Pt was persistently tachycardic in 130-140s despite receiving Adenosine 6mg IV and then 12mg IV, Lopressor 2.5mg x2, PO Metoprolol tartrate 100mg. Utox was positive for opioids. CTPA suggestive of subsegmental PE in the right upper lobe. Echo showed severely reduced LV function with an LV thrombus. Heparin gtt was started. Cardiology and EP were consulted due to atrial flutter. EP suggested transfer to [facility] for LORENZO and possible ablation.       since admit long and complicated course    For aflutter, digoxin loaded , started on amiodarone gtt.   EP was consulted for possible ablation vs cardioversion, however patient was deemed too high-risk.     TTE showed LVEF 10-15%, 2.5x2.3cm mobile echodensity in the LV apex c/w thrombus, severely reduced RV systolic function, L pleural effusion and trivial pericardial effusion.     For new HFrEF, he was continued on a nipride gtt for afterload reduction.     Patient underwent LORENZO the following day which showed no thrombus in the LA/RA/ISIAH/RAA.     Patient underwent arterial duplex which showed soft, likely acute thrombus completely occluding the left popliteal artery; additional soft, likely acute thrombus incompletely occluding the left dorsalis pedis artery; there is a soft, likely acute thrombus completely occluding the left mid superficial femoral artery and extending to the left popliteal artery; distal to this is monophasic waveforms.     Patient began vomiting, yawning, diarrhea on 12/10/22,, given methadone given concern for opioid withdrawal.     Patient had bloody BM and began complaining of abdominal pain.   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   CT also showed irregular cystic structure in L upper pole of L kidney c/f pyelonephritis vs renal infarction.     On 12/11/22, Patient underwent emergent ex lap, SMA embolectomy and had 80cm of ischemic bowel resected, abdomen left open and wound vac placed.     He returned to OR two more times: 12/13 for additional bowel resection and 12/15 for abdomen closure and creation of loop ileostomy.     Patient was extubated on 12/17.     His left lower extremity has become ischemic and has started to demarcate below the knee, with skin changes and blistering.     Vascular surgery is following and defer intervention until patient is stable from cardiac standpoint.     Patient has been hemodynamically stable and pain controlled with fentanyl patch/diluadid. He remains on a heparin gtt for due to A-flutter though not rate controlled.   Was Planned for LORENZO 12/22/22 / ablation with Electrophysiology.     However developed GI bleeding from rectum overnight and dropped HgB from 10-7's, transfused and started on Vaso, and low dose peripheral levo    POCUS at bedside with free fluid in abdomen that layers    GI consulted for GI bleeding      Allergies    No Known Allergies    Intolerances      Home Medications:    MEDICATIONS:  MEDICATIONS  (STANDING):  chlorhexidine 2% Cloths 1 Application(s) Topical <User Schedule>  dextrose 10%. 1000 milliLiter(s) (50 mL/Hr) IV Continuous <Continuous>  dextrose 5%. 1000 milliLiter(s) (50 mL/Hr) IV Continuous <Continuous>  dextrose 5%. 1000 milliLiter(s) (100 mL/Hr) IV Continuous <Continuous>  dextrose 50% Injectable 25 Gram(s) IV Push once  dextrose 50% Injectable 12.5 Gram(s) IV Push once  dextrose 50% Injectable 25 Gram(s) IV Push once  fentaNYL   Patch  50 MICROgram(s)/Hr. 1 Patch Transdermal every 48 hours  glucagon  Injectable 1 milliGRAM(s) IntraMuscular once  influenza  Vaccine (HIGH DOSE) 0.7 milliLiter(s) IntraMuscular once  insulin lispro (ADMELOG) corrective regimen sliding scale   SubCutaneous every 6 hours  loperamide 2 milliGRAM(s) Oral daily  norepinephrine Infusion 0.05 MICROgram(s)/kG/Min (6.04 mL/Hr) IV Continuous <Continuous>  pantoprazole  Injectable 40 milliGRAM(s) IV Push two times a day  silver sulfADIAZINE 1% Cream 1 Application(s) Topical daily  vasopressin Infusion 0.04 Unit(s)/Min (6 mL/Hr) IV Continuous <Continuous>    MEDICATIONS  (PRN):  acetaminophen     Tablet .. 650 milliGRAM(s) Oral every 6 hours PRN Temp greater or equal to 38C (100.4F), Mild Pain (1 - 3)  dextrose Oral Gel 15 Gram(s) Oral once PRN Blood Glucose LESS THAN 70 milliGRAM(s)/deciliter  HYDROmorphone  Injectable 1 milliGRAM(s) IV Push every 3 hours PRN Severe Pain (7 - 10)  HYDROmorphone  Injectable 0.5 milliGRAM(s) IV Push every 3 hours PRN Moderate Pain (4 - 6)    PAST MEDICAL & SURGICAL HISTORY: as above    FAMILY HISTORY: htn    SOCIAL HISTORY: lives in facility    REVIEW OF SYSTEMS:  All other 10 review of systems is negative unless indicated above.    Vital Signs Last 24 Hrs  T(C): 36.3 (22 Dec 2022 11:00), Max: 37.1 (22 Dec 2022 09:00)  T(F): 97.3 (22 Dec 2022 11:00), Max: 98.7 (22 Dec 2022 09:00)  HR: 132 (22 Dec 2022 12:00) (115 - 140)  BP: 109/67 (22 Dec 2022 12:00) (64/41 - 167/67)  BP(mean): 84 (22 Dec 2022 12:00) (48 - 110)  RR: 14 (22 Dec 2022 12:00) (9 - 31)  SpO2: 100% (22 Dec 2022 12:00) (97% - 100%)    Parameters below as of 22 Dec 2022 12:00  Patient On (Oxygen Delivery Method): room air        12-21 @ 07:01  -  12-22 @ 07:00  --------------------------------------------------------  IN: 3213.5 mL / OUT: 4270 mL / NET: -1056.5 mL    12-22 @ 07:01  -  12-22 @ 12:27  --------------------------------------------------------  IN: 656.8 mL / OUT: 185 mL / NET: 471.8 mL        PHYSICAL EXAM:    General: lying in bed, appears anxious  HEENT: Neck supple, mmm, no jvd  Lungs: Normal respiratory effort, no intercostal retractions  Cardiovascular: tachycardic  Abdomen: midline incision stapled, RLQ ostomy pink patent non-distended;   Extremities: le ischemic changes  Skin: Warm and dry. No obvious rash    LABS:                        7.3    20.30 )-----------( 298      ( 22 Dec 2022 09:34 )             21.9     12-22    134<L>  |  102  |  6<L>  ----------------------------<  126<H>  4.2   |  25  |  0.94    Ca    7.8<L>      22 Dec 2022 04:39  Phos  2.7     12-22  Mg     1.9     12-22    TPro  5.1<L>  /  Alb  2.0<L>  /  TBili  1.2  /  DBili  x   /  AST  27  /  ALT  17  /  AlkPhos  86  12-22        PT/INR - ( 22 Dec 2022 04:39 )   PT: 16.9 sec;   INR: 1.42          PTT - ( 22 Dec 2022 04:39 )  PTT:87.7 sec    RADIOLOGY & ADDITIONAL STUDIES:     Reviewed

## 2022-12-23 NOTE — CHART NOTE - NSCHARTNOTEFT_GEN_A_CORE
Serial Abdominal Exam @ 0020    S: Pt denies any abdominal discomfort or lightheadedness at this time. Passing a small amount of flatus. Denies N/V. Denies CP, SOB, calf tenderness.     O:  T(C): 37.4 (12-22-22 @ 20:53), Max: 37.4 (12-22-22 @ 20:53)  T(F): 99.3 (12-22-22 @ 20:53), Max: 99.3 (12-22-22 @ 20:53)  HR: 133 (12-22-22 @ 21:00) (132 - 133)  BP: 98/71 (12-22-22 @ 21:00) (98/71 - 114/70)  RR: 20 (12-22-22 @ 21:00) (20 - 22)  SpO2: 99% (12-22-22 @ 21:00) (98% - 99%)  Wt(kg): --                        8.2    18.06 )-----------( 300      ( 22 Dec 2022 19:15 )             23.4     12-22    134<L>  |  102  |  6<L>  ----------------------------<  126<H>  4.2   |  25  |  0.94    Ca    7.8<L>      22 Dec 2022 04:39  Phos  2.7     12-22  Mg     1.9     12-22    TPro  5.1<L>  /  Alb  2.0<L>  /  TBili  1.2  /  DBili  x   /  AST  27  /  ALT  17  /  AlkPhos  86  12-22      Gen: NAD, sleeping  C/V: continued tachycardia - stable  Pulm: Nonlabored breathing, no respiratory distress  Abd: soft, ND/NT, no rebound or guarding   Extrem: SCDs in place    Abdominal exam unchanged/unremarkable.  Will continue to monitor.

## 2022-12-23 NOTE — PROGRESS NOTE ADULT - SUBJECTIVE AND OBJECTIVE BOX
INTERVAL/OVERNIGHT EVENTS: Nephew visited. Tachycardic; stable vaso 0.02. H&H gradually downtrending (7.5 /21.3 in am). UOP fell off last two hr. Ordered 1 U pRBC at 0540.       SUBJECTIVE: Patient seen at bedside in no acute distress, depressive mood. No CP, SOB, fevers or chills     Neurologic Medications  acetaminophen     Tablet .. 650 milliGRAM(s) Oral every 6 hours PRN Temp greater or equal to 38C (100.4F), Mild Pain (1 - 3)  HYDROmorphone  Injectable 0.5 milliGRAM(s) IV Push every 3 hours PRN Moderate Pain (4 - 6)  HYDROmorphone  Injectable 1 milliGRAM(s) IV Push every 3 hours PRN Severe Pain (7 - 10)    Respiratory Medications    Cardiovascular Medications    Gastrointestinal Medications  dextrose 10%. 1000 milliLiter(s) IV Continuous <Continuous>  dextrose 5%. 1000 milliLiter(s) IV Continuous <Continuous>  dextrose 5%. 1000 milliLiter(s) IV Continuous <Continuous>  pantoprazole  Injectable 40 milliGRAM(s) IV Push two times a day    Genitourinary Medications    Hematologic/Oncologic Medications  influenza  Vaccine (HIGH DOSE) 0.7 milliLiter(s) IntraMuscular once    Antimicrobial/Immunologic Medications    Endocrine/Metabolic Medications  dextrose 50% Injectable 25 Gram(s) IV Push once  dextrose 50% Injectable 12.5 Gram(s) IV Push once  dextrose 50% Injectable 25 Gram(s) IV Push once  dextrose Oral Gel 15 Gram(s) Oral once PRN Blood Glucose LESS THAN 70 milliGRAM(s)/deciliter  glucagon  Injectable 1 milliGRAM(s) IntraMuscular once  insulin lispro (ADMELOG) corrective regimen sliding scale   SubCutaneous every 6 hours    Topical/Other Medications  chlorhexidine 2% Cloths 1 Application(s) Topical <User Schedule>  silver sulfADIAZINE 1% Cream 1 Application(s) Topical daily      MEDICATIONS  (PRN):  acetaminophen     Tablet .. 650 milliGRAM(s) Oral every 6 hours PRN Temp greater or equal to 38C (100.4F), Mild Pain (1 - 3)  dextrose Oral Gel 15 Gram(s) Oral once PRN Blood Glucose LESS THAN 70 milliGRAM(s)/deciliter  HYDROmorphone  Injectable 0.5 milliGRAM(s) IV Push every 3 hours PRN Moderate Pain (4 - 6)  HYDROmorphone  Injectable 1 milliGRAM(s) IV Push every 3 hours PRN Severe Pain (7 - 10)      I&O's Detail    22 Dec 2022 07:01  -  23 Dec 2022 07:00  --------------------------------------------------------  IN:    dextrose 10%: 1150 mL    Heparin: 13 mL    IV PiggyBack: 100 mL    Norepinephrine: 7.2 mL    PRBCs (Packed Red Blood Cells): 350 mL    Vasopressin: 99 mL  Total IN: 1719.2 mL    OUT:    Ileostomy (mL): 900 mL    Indwelling Catheter - Urethral (mL): 435 mL    Voided (mL): 60 mL  Total OUT: 1395 mL    Total NET: 324.2 mL      23 Dec 2022 07:01  -  23 Dec 2022 14:19  --------------------------------------------------------  IN:    dextrose 10%: 300 mL    IV PiggyBack: 250 mL    IV PiggyBack: 300 mL    PRBCs (Packed Red Blood Cells): 200 mL  Total IN: 1050 mL    OUT:    Ileostomy (mL): 250 mL    Indwelling Catheter - Urethral (mL): 210 mL    Vasopressin: 0 mL  Total OUT: 460 mL    Total NET: 590 mL          Vital Signs Last 24 Hrs  T(C): 36.9 (23 Dec 2022 13:42), Max: 37.4 (22 Dec 2022 20:53)  T(F): 98.4 (23 Dec 2022 13:42), Max: 99.3 (22 Dec 2022 20:53)  HR: 107 (23 Dec 2022 14:00) (107 - 133)  BP: 98/71 (22 Dec 2022 21:00) (98/71 - 114/70)  BP(mean): 80 (22 Dec 2022 21:00) (80 - 85)  RR: 18 (23 Dec 2022 14:00) (18 - 109)  SpO2: 98% (23 Dec 2022 14:00) (93% - 100%)    Parameters below as of 23 Dec 2022 14:00  Patient On (Oxygen Delivery Method): room air      Physical Exam  General: Cachetic elderly male, appears stated age, responsive to questions  Neuro: Grossly intact bilaterally   HEENT: Normocephalic, atraumatic, trachea midline, no JVD   Chest: Equal rise and fall of chest   Heart: Regular S1/S2, atrial flutter   Lungs: Unlabored breathing on room air; Clear to auscultation bilaterally, no adventitious sounds   Abdomen: Soft, non-distended, normoactive bowel sounds throughout, no tenderness to palpation in all 4 quadrants; RLQ ostomy pink with brown liquid, midline laparotomy incision is clean/dry/intact    Upper Extremities: Trace edema in hands, freely mobile bilaterally   Lower Extremities: No edema, SCDs in place on RLE; RLE warm, LLE cool with ruptured blisters from upper calf to toes   Neuro: Severe sensory and motor deficits in LLE. Absent sensation in left toes/foot  Skin: Warm except for LLE, non-diaphoretic throughout   LABS:                        8.2    14.17 )-----------( 289      ( 23 Dec 2022 11:06 )             23.9     12-23    135  |  101  |  13  ----------------------------<  140<H>  3.7   |  24  |  1.02    Ca    7.5<L>      23 Dec 2022 04:05  Phos  2.9     12-23  Mg     1.9     12-23    TPro  5.1<L>  /  Alb  2.0<L>  /  TBili  1.2  /  DBili  x   /  AST  27  /  ALT  17  /  AlkPhos  86  12-22    PT/INR - ( 22 Dec 2022 14:30 )   PT: 16.2 sec;   INR: 1.36          PTT - ( 22 Dec 2022 14:30 )  PTT:29.1 sec      RADIOLOGY & ADDITIONAL STUDIES:

## 2022-12-23 NOTE — PROGRESS NOTE ADULT - ASSESSMENT
78yo M with no significant PMH/PSx admitted to cardiology service on 12/7 in the setting of severely reduced LV function 2/2 an LV thrombus. Pt now with several days of post-prandial abdominal pain and loose BMs, initially suspected to be 2/2 opioid withdrawal, however on evening of 12/10 had a large bloody BM. 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 Vascular surgery (already following) with plan for OR. General surgery consulted for possible operative assistance in the setting of bowel ischemia. Now POD2 s/p ex lap, SMA embolectomy and small bowel resection. Transferred to CCU on 12/21/22 for cardiac optimization and now transferred back to SICU 12/22/22 for bleeding hemodynamic instability. CTA performed demonstrating large hematoma in R abdomen, new hemoperitoneum, and bilateral pleural effusions.     Plan:    -Trend CBC  -Trend lactate  -Monitor HD status  -Wean off pressors  -Holding heparin due to new hemoperitoneum findings  -Maintain active t&s, and coags  -Transfuse as needed  -Serial abdominal exams  -NPO  -IVF  -Rest of care per SICU  -Surgery Team 4C will continue to follow. Please page Team 4 with questions/clinical changes. [phone]

## 2022-12-23 NOTE — PROGRESS NOTE ADULT - SUBJECTIVE AND OBJECTIVE BOX
Subjective:    yesterday received pRBC and stabilized with pressors. initial plan for OR cancelled yesterday.   central venous sat came back 70%, MAPs >65, mentating well, satting well on room air.   sleeping comfortably, abdominal pain no complaints otherwise.     Medications:  acetaminophen     Tablet .. 650 milliGRAM(s) Oral every 6 hours PRN  chlorhexidine 2% Cloths 1 Application(s) Topical <User Schedule>  dextrose 10%. 1000 milliLiter(s) IV Continuous <Continuous>  dextrose 5%. 1000 milliLiter(s) IV Continuous <Continuous>  dextrose 5%. 1000 milliLiter(s) IV Continuous <Continuous>  dextrose 50% Injectable 25 Gram(s) IV Push once  dextrose 50% Injectable 12.5 Gram(s) IV Push once  dextrose 50% Injectable 25 Gram(s) IV Push once  dextrose Oral Gel 15 Gram(s) Oral once PRN  glucagon  Injectable 1 milliGRAM(s) IntraMuscular once  HYDROmorphone  Injectable 1 milliGRAM(s) IV Push every 3 hours PRN  HYDROmorphone  Injectable 0.5 milliGRAM(s) IV Push every 3 hours PRN  influenza  Vaccine (HIGH DOSE) 0.7 milliLiter(s) IntraMuscular once  insulin lispro (ADMELOG) corrective regimen sliding scale   SubCutaneous every 6 hours  pantoprazole  Injectable 40 milliGRAM(s) IV Push two times a day  silver sulfADIAZINE 1% Cream 1 Application(s) Topical daily      Physical Exam:    Vitals:  Vital Signs Last 24 Hours  T(C): 36.9 (12-23-22 @ 09:09), Max: 37.4 (12-22-22 @ 20:53)  HR: 110 (12-23-22 @ 13:00) (109 - 133)  BP: 98/71 (12-22-22 @ 21:00) (98/71 - 114/70)  RR: 18 (12-23-22 @ 13:00) (17 - 109)  SpO2: 93% (12-23-22 @ 13:00) (93% - 100%)        I&O's Summary    22 Dec 2022 07:01  -  23 Dec 2022 07:00  --------------------------------------------------------  IN: 1719.2 mL / OUT: 1395 mL / NET: 324.2 mL    23 Dec 2022 07:01  -  23 Dec 2022 13:12  --------------------------------------------------------  IN: 1050 mL / OUT: 460 mL / NET: 590 mL        Tele:    resting comfortably, NAD  NEck supple no JVD  Heart irreg irreg, tachy s1s2  Lungs CTAB  Abd soft, distended  Ext warm on the right, dependent edema    Labs:                        8.2    14.17 )-----------( 289      ( 23 Dec 2022 11:06 )             23.9     12-23    135  |  101  |  13  ----------------------------<  140<H>  3.7   |  24  |  1.02    Ca    7.5<L>      23 Dec 2022 04:05  Phos  2.9     12-23  Mg     1.9     12-23    TPro  5.1<L>  /  Alb  2.0<L>  /  TBili  1.2  /  DBili  x   /  AST  27  /  ALT  17  /  AlkPhos  86  12-22    PT/INR - ( 22 Dec 2022 14:30 )   PT: 16.2 sec;   INR: 1.36          PTT - ( 22 Dec 2022 14:30 )  PTT:29.1 sec          Lactate Dehydrogenase, Serum: 300 U/L (12-22 @ 04:39)    Lactate, Blood: 2.2 mmol/L (12-23 @ 04:05)  Lactate, Blood: 2.4 mmol/L (12-23 @ 00:49)  Lactate, Blood: 2.3 mmol/L (12-22 @ 19:15)  Lactate, Blood: 2.0 mmol/L (12-22 @ 15:29)  Lactate, Blood: 2.2 mmol/L (12-22 @ 04:39)      < from: CT Angio Abdomen and Pelvis w/ IV Cont (12.22.22 @ 13:37) >  1. Since December 11, 2022, no active contrast extravasation. Large right   hemiabdomen hematoma, involving hepatic flexure and proximal transverse   colon, possible mesocolon injury.  2. Increased ascites and bilateral pleural effusions. New hemoperitoneum.  3. Reconstitution of flow in superior mesenteric artery.    < end of copied text >

## 2022-12-23 NOTE — PROGRESS NOTE ADULT - ASSESSMENT
78 y/o M with no significant PMHx found unresponsive at his nursing home, resolved after narcan in the field, and brought to [facility]. Found to have LV dysfunction (Tachy mediated), sub-segmental PE, atrial flutter, and large LV thrombus on echo. Transferred to [facility] for further management. Course complicated by acute mesenteric ischemia now s/p embolectomy and bowel resection, septic shock, LLE arterial occlusion.  he was recovering well, tolerating the introduction of GDMT and plan was for cardioversion was delayed as he continues to have GI bleeding. GDMT was discontinued and now receiving transfusions and supported with vasopressors.     #HFrEF   EF 10-15%, LVIDD 5. Severely reduced RV function.   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

## 2022-12-23 NOTE — PROGRESS NOTE ADULT - SUBJECTIVE AND OBJECTIVE BOX
ON: Nephew visited. Tachycardic; stable vaso 0.02. H&H gradually downtrending (7.5 /21.3 in am). UOP fell off last two hr. Ordered 1 U pRBC at 0540.   12/22: Hgb decreasd to 7.8  and hypotensive in am started on Vaso, levo leveo stopped shortly after starting.  Transferred to SICU GIven 1UPRBC repeat 7.3 given another UPRBC. CTA Showing hemoperitenium. Repeat CBC@3 8.3 Uriarte placed. Repeat Again to 7pm with LA and cbc. then another ordered for MN.     SUBJECTIVE: Patient seen and examined bedside; sleepy, receiving 1uPRBC, no complaints during exam.    norepinephrine Infusion 0.05 MICROgram(s)/kG/Min IV Continuous <Continuous>      Vital Signs Last 24 Hrs  T(C): 36.9 (23 Dec 2022 05:36), Max: 37.4 (22 Dec 2022 20:53)  T(F): 98.4 (23 Dec 2022 05:36), Max: 99.3 (22 Dec 2022 20:53)  HR: 129 (23 Dec 2022 05:00) (125 - 137)  BP: 98/71 (22 Dec 2022 21:00) (74/51 - 114/70)  BP(mean): 80 (22 Dec 2022 21:00) (58 - 85)  RR: 18 (23 Dec 2022 05:00) (11 - 109)  SpO2: 97% (23 Dec 2022 05:00) (96% - 100%)    Parameters below as of 23 Dec 2022 05:00  Patient On (Oxygen Delivery Method): room air      I&O's Detail    22 Dec 2022 07:01  -  23 Dec 2022 07:00  --------------------------------------------------------  IN:    dextrose 10%: 1000 mL    Heparin: 13 mL    Norepinephrine: 7.2 mL    PRBCs (Packed Red Blood Cells): 350 mL    Vasopressin: 90 mL  Total IN: 1460.2 mL    OUT:    Ileostomy (mL): 450 mL    Indwelling Catheter - Urethral (mL): 345 mL    Voided (mL): 60 mL  Total OUT: 855 mL    Total NET: 605.2 mL      PE:    General: NAD, resting comfortably in bed  C/V: S1 s2, RRR  Pulm: Nonlabored breathing, no respiratory distress  Abd: Soft, NTND, ostomy with brown liquid stool in bag  Extrem: [facility]      LABS:                        7.5    14.07 )-----------( 297      ( 23 Dec 2022 04:05 )             21.4     12-23    135  |  101  |  13  ----------------------------<  140<H>  3.7   |  24  |  1.02    Ca    7.5<L>      23 Dec 2022 04:05  Phos  2.9     12-23  Mg     1.9     12-23    TPro  5.1<L>  /  Alb  2.0<L>  /  TBili  1.2  /  DBili  x   /  AST  27  /  ALT  17  /  AlkPhos  86  12-22    PT/INR - ( 22 Dec 2022 14:30 )   PT: 16.2 sec;   INR: 1.36          PTT - ( 22 Dec 2022 14:30 )  PTT:29.1 sec      RADIOLOGY & ADDITIONAL STUDIES:

## 2022-12-23 NOTE — CHART NOTE - NSCHARTNOTEFT_GEN_A_CORE
Patient seen and examined at bedside. He is currently afebrile, -130S, remains on vasopressin @.02mg, sat well on RA.  Urine output 15-30cc/hr for the past 3 hrs. AAOX3. 1am labs Hg 8.0 (8.2) (8.3), lactate 2.4 (2.3), wbc 15.3 (18.06). Denies abdominal pain, denies N/V/chest pain/dyspnea. Denies blood per rectum. Ostomy functioning well with appropriate output. On physical exam abdomen soft, nondistended, moderate LLQ tenderness unchanged from previous exam, no rebound, no guarding.  Will continue serial abdominal exams and pending repeat CBC/lactate and full labs this morning

## 2022-12-23 NOTE — PROGRESS NOTE ADULT - SUBJECTIVE AND OBJECTIVE BOX
SUBJECTIVE: Pt seen and examined at bedside. Hb today <8 - will receive 1 unit of pRBC. No episode of bloody stool overnight. On small dose of pressors (decreased from yesterday)    MEDICATIONS  (STANDING):  chlorhexidine 2% Cloths 1 Application(s) Topical <User Schedule>  dextrose 10%. 1000 milliLiter(s) (50 mL/Hr) IV Continuous <Continuous>  dextrose 5%. 1000 milliLiter(s) (100 mL/Hr) IV Continuous <Continuous>  dextrose 5%. 1000 milliLiter(s) (50 mL/Hr) IV Continuous <Continuous>  dextrose 50% Injectable 25 Gram(s) IV Push once  dextrose 50% Injectable 12.5 Gram(s) IV Push once  dextrose 50% Injectable 25 Gram(s) IV Push once  fentaNYL   Patch  50 MICROgram(s)/Hr. 1 Patch Transdermal every 48 hours  glucagon  Injectable 1 milliGRAM(s) IntraMuscular once  influenza  Vaccine (HIGH DOSE) 0.7 milliLiter(s) IntraMuscular once  insulin lispro (ADMELOG) corrective regimen sliding scale   SubCutaneous every 6 hours  loperamide 2 milliGRAM(s) Oral daily  norepinephrine Infusion 0.05 MICROgram(s)/kG/Min (6.04 mL/Hr) IV Continuous <Continuous>  pantoprazole  Injectable 40 milliGRAM(s) IV Push two times a day  potassium chloride  10 mEq/100 mL IVPB 10 milliEquivalent(s) IV Intermittent every 1 hour  potassium phosphate IVPB 15 milliMole(s) IV Intermittent once  silver sulfADIAZINE 1% Cream 1 Application(s) Topical daily  vasopressin Infusion 0.04 Unit(s)/Min (6 mL/Hr) IV Continuous <Continuous>    MEDICATIONS  (PRN):  acetaminophen     Tablet .. 650 milliGRAM(s) Oral every 6 hours PRN Temp greater or equal to 38C (100.4F), Mild Pain (1 - 3)  dextrose Oral Gel 15 Gram(s) Oral once PRN Blood Glucose LESS THAN 70 milliGRAM(s)/deciliter  HYDROmorphone  Injectable 1 milliGRAM(s) IV Push every 3 hours PRN Severe Pain (7 - 10)  HYDROmorphone  Injectable 0.5 milliGRAM(s) IV Push every 3 hours PRN Moderate Pain (4 - 6)      Vital Signs Last 24 Hrs  T(C): 36.9 (23 Dec 2022 05:36), Max: 37.4 (22 Dec 2022 20:53)  T(F): 98.4 (23 Dec 2022 05:36), Max: 99.3 (22 Dec 2022 20:53)  HR: 129 (23 Dec 2022 08:00) (113 - 137)  BP: 98/71 (22 Dec 2022 21:00) (74/51 - 114/70)  BP(mean): 80 (22 Dec 2022 21:00) (58 - 85)  RR: 18 (23 Dec 2022 08:00) (12 - 109)  SpO2: 96% (23 Dec 2022 08:00) (96% - 100%)    Parameters below as of 23 Dec 2022 08:00  Patient On (Oxygen Delivery Method): room air        Physical Exam  General: NAD,   Pulmonary: Nonlabored breathing,   CV: NSR  Abd: soft, ileostomy pink with red rubber in place, ostomy bag with liquid output (no blood),  Extremities: minimal edema, left calf/foot cool, right calf/foot warm, well-perfused, no L DP/PT signals, R PT mono, no R DP, patchy discoloration of the left calf, anterior desquamation of the skin, decreased sensation in L foot and anterior portion of calf. toes necrotic    I&O's Detail    22 Dec 2022 07:01  -  23 Dec 2022 07:00  --------------------------------------------------------  IN:    dextrose 10%: 1000 mL    Heparin: 13 mL    Norepinephrine: 7.2 mL    PRBCs (Packed Red Blood Cells): 350 mL    Vasopressin: 90 mL  Total IN: 1460.2 mL    OUT:    Ileostomy (mL): 450 mL    Indwelling Catheter - Urethral (mL): 345 mL    Voided (mL): 60 mL  Total OUT: 855 mL    Total NET: 605.2 mL          LABS:                        7.5    14.07 )-----------( 297      ( 23 Dec 2022 04:05 )             21.4     12-23    135  |  101  |  13  ----------------------------<  140<H>  3.7   |  24  |  1.02    Ca    7.5<L>      23 Dec 2022 04:05  Phos  2.9     12-23  Mg     1.9     12-23    TPro  5.1<L>  /  Alb  2.0<L>  /  TBili  1.2  /  DBili  x   /  AST  27  /  ALT  17  /  AlkPhos  86  12-22    PT/INR - ( 22 Dec 2022 14:30 )   PT: 16.2 sec;   INR: 1.36          PTT - ( 22 Dec 2022 14:30 )  PTT:29.1 sec      RADIOLOGY & ADDITIONAL STUDIES:

## 2022-12-23 NOTE — PROGRESS NOTE ADULT - ATTENDING COMMENTS
78 YO M with a history of cocaine/opiate abuse and resident of a nursing facility who was brought [place] [facility] with unresponsiveness that was alleviated with narcan and found to be in rapid atrial flutter with severe LV dysfunction with mobile LV thrombus and subsegemental PE prompting transfer to [facility] on 12/7. He was also found to have limb ischemia with arterial thrombosis of LLE vessels. He was being considered for rhythm control when he developed abdominal pain and found to have acute mesenteric ischemia and underwent emergent bowel resection and SMA thrombectomy. His LV thrombus us no longer apparent on TTE and has likely embolized.    He had a prolonged open abdomen which is now closed with placement of ostomy. He was clinically improving and tolerating escalating doses of HF medical therapy but acutely worsened 12/21 in the setting of acute GI bleeding and possible infection requiring pressors. He does not have signs of decreased cardiac output at this time but will need to monitor closely. He would benefit from rhythm control when able to tolerate uninterrupted anticoagulation.    REVIEW OF STUDIES  TTE: LV 5.0 cm, LVEF 10-15% with global hypokinesis, 2.5 cm mobile LV thrombus, severe RV dysfunction  LORENZO: no ISIAH thrombus    PLAN  # Shock  - Likely hypovolemic in setting of bleeding and possible infection  - Check central venous saturations daily from central line to roughly estimate Kirt CO/CI, so far these are reassuring    -Favor pan-culture in setting of worsening leukocytosis and consideration of empiric antibitoics     # Acute systolic heart failure  - Etiology is possibly tachycardia induced. EP following and plan is for eventual rhythm control. ischemic evaluation as outpatient if LVEF remains reduced  - GDMT: on hold in setting of recurrent shock   - Euvolemic/dry off diuretics with low CVP, OK to administer PRBC and IVF  - Device premature at this time    # Atrial flutter  - EP following  - a/c on hold with active bleeding, resume when able   - Rates borderline controlled at this time, discuss with EP and consider digoxin if rapid   - Tentative plan for DCCV this admission pending resolution of active issues     # Acute limb ischemia and mesenteric ischemia from LV thrombus  - management per surgery/vascular  - continue anticoagulation    # GOLDIE  - likely post-op ATN and renal infarction, resolved .

## 2022-12-23 NOTE — PROGRESS NOTE ADULT - ASSESSMENT
78 y/o M with Significant PMHx of IVDU found unresponsive at his nursing home, resolved after Narcan in the field, and brought to [facility]. Found to have PE, atrial flutter, and large LV thrombus on echo. Transferred to [facility] for further management. Pt C/o abdominal pain on 12/10 CTA showing mid SMA with embolus. Abnormal distal small bowel loops and cecum with dilatation and pneumatosis suggesting infarcted bowel. One or two tiny foci of  intrahepatic portal vein pneumatosis. Segmental infarction upper pole left kidney. Now s/p Ex lap, SMA embolectomy, 80cm SBR, abthera vac left in discontinuity (12/11) and transferred to SICU intubated. S/p second look (12/13) and most recently s/p OR for 3rd look, end-to-end anastomosis of remaining bowel, loop ileostomy and abdomen closure (12/15). Remains in SICU now extubated with acute limb ischemia to LLE pending amputation and AC held receiving another BRBPR, pressor requirements decreasing from yesterday.     Plan:   - no indication for acute vascular intervention at this time - awaiting for leg to demarcate  - Rest of care per SICU  - Vascular surgery Team 3C will continue to follow. Please call [phone] with any questions or concerns.

## 2022-12-23 NOTE — PROGRESS NOTE ADULT - ASSESSMENT
78 y/o M with Significant PMHx of IVDU found unresponsive at his nursing home, resolved after Narcan in the field, and brought to [facility]. Found to have PE, atrial flutter, and large LV thrombus on echo. Transferred to [facility] for further management. Pt C/o abdominal pain on 12/10 CTA showing mid SMA with embolus. Abnormal distal small bowel loops and cecum with dilatation and pneumatosis suggesting infarcted bowel. One or two tiny foci of  intrahepatic portal vein pneumatosis. Segmental infarction upper pole left kidney. Now s/p Ex lap, SMA embolectomy, 80cm SBR, abthera vac left in discontinuity (12/11) and transferred to SICU intubated. S/p second look (12/13) and most recently s/p OR for 3rd look, end-to-end anastomosis of remaining bowel, loop ileostomy and abdomen closure (12/15). Remains in SICU now extubated with acute limb ischemia to LLE pending amputation and AC held given BRBPR.     Neuro: Pain: Fentanyl patch 50mcg q48h; IV Tylenol ATC, PRN Dilaudid.   Psych: Agitation improved--now off Precdex. Psych recs: no longer suicidal.   CV: Septic shock--resolved; AFlutter s/p Amio gtt and Digoxin, started Toprol XL 50; TTE (12/7) CHF- Severe RV dysfxn and LVEF 15%, started Spironolactone and Valsartan. LV thrombus w LLE limb ischemia- holding Heparin gtt in setting of bleeding. ASA 81.   Pulm: Extubated to RA on 12/17, IS  GI: CLD, D5LR @125/hr, PPI. Ileostomy high output--improved, off imodium.  : Voids- Infarction of upper pole of Left Kidney, Renal US negative on 12/12.   ID: LLE wounds Vanc (12-19--), Zosyn (12/19--), Ischemic bowel: Zosyn (12/11-12/18)    Heme: Hep C positive; Active T&S  Endo: mISS  PPx: SCD, .  Lines: PIVs D/c: Rt TLC (12/11-12/17), L radial connie (12/11-12/20),

## 2022-12-24 NOTE — PROGRESS NOTE ADULT - SUBJECTIVE AND OBJECTIVE BOX
S/IE: Patient seen at bedside in ICU. Awaking from sleep with no specific complaints. CTA 12/22 w/ hemoperitoneum, now s/p 2u PRBC over last 48h with stabilization of hgb trend and resolution of pressor requirements. Off pressors since yesterday 0500. No leukocytosis or fevers.     MEDICATIONS  (STANDING):  chlorhexidine 2% Cloths 1 Application(s) Topical <User Schedule>  dextrose 10%. 1000 milliLiter(s) (50 mL/Hr) IV Continuous <Continuous>  dextrose 5%. 1000 milliLiter(s) (50 mL/Hr) IV Continuous <Continuous>  dextrose 5%. 1000 milliLiter(s) (100 mL/Hr) IV Continuous <Continuous>  dextrose 50% Injectable 25 Gram(s) IV Push once  dextrose 50% Injectable 12.5 Gram(s) IV Push once  dextrose 50% Injectable 25 Gram(s) IV Push once  glucagon  Injectable 1 milliGRAM(s) IntraMuscular once  influenza  Vaccine (HIGH DOSE) 0.7 milliLiter(s) IntraMuscular once  insulin lispro (ADMELOG) corrective regimen sliding scale   SubCutaneous every 6 hours  pantoprazole  Injectable 40 milliGRAM(s) IV Push two times a day  silver sulfADIAZINE 1% Cream 1 Application(s) Topical daily    MEDICATIONS  (PRN):  acetaminophen     Tablet .. 650 milliGRAM(s) Oral every 6 hours PRN Temp greater or equal to 38C (100.4F), Mild Pain (1 - 3)  dextrose Oral Gel 15 Gram(s) Oral once PRN Blood Glucose LESS THAN 70 milliGRAM(s)/deciliter  oxyCODONE    Solution 5 milliGRAM(s) Oral every 4 hours PRN Severe Pain (7 - 10)      Allergies    No Known Allergies    Intolerances          Vital Signs Last 24 Hrs  T(C): 36.3 (24 Dec 2022 06:19), Max: 37.3 (24 Dec 2022 00:01)  T(F): 97.3 (24 Dec 2022 06:19), Max: 99.1 (24 Dec 2022 00:01)  HR: 101 (24 Dec 2022 07:00) (96 - 129)  BP: 162/80 (24 Dec 2022 07:00) (107/55 - 162/80)  BP(mean): 106 (24 Dec 2022 07:00) (75 - 113)  RR: 15 (24 Dec 2022 07:00) (10 - 21)  SpO2: 98% (24 Dec 2022 07:00) (93% - 100%)    Parameters below as of 24 Dec 2022 08:00  Patient On (Oxygen Delivery Method): room air      CAPILLARY BLOOD GLUCOSE      POCT Blood Glucose.: 90 mg/dL (24 Dec 2022 05:27)  POCT Blood Glucose.: 103 mg/dL (23 Dec 2022 23:47)  POCT Blood Glucose.: 115 mg/dL (23 Dec 2022 16:57)  POCT Blood Glucose.: 103 mg/dL (23 Dec 2022 11:28)      12-23 @ 07:01  -  12-24 @ 07:00  --------------------------------------------------------  IN: 2112.5 mL / OUT: 3690 mL / NET: -1577.5 mL    12-24 @ 07:01  -  12-24 @ 07:30  --------------------------------------------------------  IN: 112.5 mL / OUT: 0 mL / NET: 112.5 mL    Physical Exam  General: NAD, appears uncomfortable  Pulmonary: Nonlabored breathing, on room air  CV: NSR, HDS, WWP  Abd: soft, ileostomy pink with red rubber in place, ostomy bag with liquid output (no blood),  Extremities: Toes of LLE w/ dry gangrene. Large bullae of left calf dressed in kerlix with some serosanguinous drainage from the desquamated patches. Diffuse mild edema of LLE. LLE cool. No signs of infection or wet gangrene. no L DP/PT signals, R PT mono, no R DP    LABS:                        8.2    10.62 )-----------( 309      ( 24 Dec 2022 05:38 )             24.1     12-24    136  |  103  |  10  ----------------------------<  94  3.8   |  25  |  0.72    Ca    7.7<L>      24 Dec 2022 05:38  Phos  2.3     12-24  Mg     2.1     12-24      PT/INR - ( 22 Dec 2022 14:30 )   PT: 16.2 sec;   INR: 1.36          PTT - ( 22 Dec 2022 14:30 )  PTT:29.1 sec    A/P: 76 y/o M with Significant PMHx of IVDU found unresponsive at his nursing home, resolved after Narcan in the field, and brought to [facility] Found to have PE, atrial flutter, and large LV thrombus on echo. Transferred to [facility] for further management. Pt C/o abdominal pain on 12/10 CTA showing mid SMA with embolus. Abnormal distal small bowel loops and cecum with dilatation and pneumatosis suggesting infarcted bowel. One or two tiny foci of  intrahepatic portal vein pneumatosis. Segmental infarction upper pole left kidney. Now s/p Ex lap, SMA embolectomy, 80cm SBR, abthera vac left in discontinuity (12/11) and transferred to SICU intubated. S/p second look (12/13) and most recently s/p OR for 3rd look, end-to-end anastomosis of remaining bowel, loop ileostomy and abdomen closure (12/15). Remains in SICU now extubated with acute limb ischemia to LLE pending amputation (BKA vs. AKA depending on whether it is felt a BKA would heal successfully), now off pressors. No current signs of infection or wet gangrene.     Plan:   -   - Rest of care per SICU  - Vascular surgery Team 3C will continue to follow. Please call [phone] with any questions or concerns.       S/IE: Patient seen at bedside in ICU. Awaking from sleep with no specific complaints. CTA 12/22 w/ hemoperitoneum, now s/p 2u PRBC over last 48h with stabilization of hgb trend and resolution of pressor requirements. Off pressors since yesterday 0500. No leukocytosis or fevers.     MEDICATIONS  (STANDING):  chlorhexidine 2% Cloths 1 Application(s) Topical <User Schedule>  dextrose 10%. 1000 milliLiter(s) (50 mL/Hr) IV Continuous <Continuous>  dextrose 5%. 1000 milliLiter(s) (50 mL/Hr) IV Continuous <Continuous>  dextrose 5%. 1000 milliLiter(s) (100 mL/Hr) IV Continuous <Continuous>  dextrose 50% Injectable 25 Gram(s) IV Push once  dextrose 50% Injectable 12.5 Gram(s) IV Push once  dextrose 50% Injectable 25 Gram(s) IV Push once  glucagon  Injectable 1 milliGRAM(s) IntraMuscular once  influenza  Vaccine (HIGH DOSE) 0.7 milliLiter(s) IntraMuscular once  insulin lispro (ADMELOG) corrective regimen sliding scale   SubCutaneous every 6 hours  pantoprazole  Injectable 40 milliGRAM(s) IV Push two times a day  silver sulfADIAZINE 1% Cream 1 Application(s) Topical daily    MEDICATIONS  (PRN):  acetaminophen     Tablet .. 650 milliGRAM(s) Oral every 6 hours PRN Temp greater or equal to 38C (100.4F), Mild Pain (1 - 3)  dextrose Oral Gel 15 Gram(s) Oral once PRN Blood Glucose LESS THAN 70 milliGRAM(s)/deciliter  oxyCODONE    Solution 5 milliGRAM(s) Oral every 4 hours PRN Severe Pain (7 - 10)      Allergies    No Known Allergies    Intolerances          Vital Signs Last 24 Hrs  T(C): 36.3 (24 Dec 2022 06:19), Max: 37.3 (24 Dec 2022 00:01)  T(F): 97.3 (24 Dec 2022 06:19), Max: 99.1 (24 Dec 2022 00:01)  HR: 101 (24 Dec 2022 07:00) (96 - 129)  BP: 162/80 (24 Dec 2022 07:00) (107/55 - 162/80)  BP(mean): 106 (24 Dec 2022 07:00) (75 - 113)  RR: 15 (24 Dec 2022 07:00) (10 - 21)  SpO2: 98% (24 Dec 2022 07:00) (93% - 100%)    Parameters below as of 24 Dec 2022 08:00  Patient On (Oxygen Delivery Method): room air      CAPILLARY BLOOD GLUCOSE      POCT Blood Glucose.: 90 mg/dL (24 Dec 2022 05:27)  POCT Blood Glucose.: 103 mg/dL (23 Dec 2022 23:47)  POCT Blood Glucose.: 115 mg/dL (23 Dec 2022 16:57)  POCT Blood Glucose.: 103 mg/dL (23 Dec 2022 11:28)      12-23 @ 07:01  -  12-24 @ 07:00  --------------------------------------------------------  IN: 2112.5 mL / OUT: 3690 mL / NET: -1577.5 mL    12-24 @ 07:01  -  12-24 @ 07:30  --------------------------------------------------------  IN: 112.5 mL / OUT: 0 mL / NET: 112.5 mL    Physical Exam  General: NAD, appears uncomfortable  Pulmonary: Nonlabored breathing, on room air  CV: NSR, HDS, WWP  Abd: soft, ileostomy pink with red rubber in place, ostomy bag with liquid output (no blood),  Extremities: Toes of LLE w/ dry gangrene. Large bullae of left calf dressed in kerlix with some serosanguinous drainage from the desquamated patches. Diffuse mild edema of LLE. LLE cool. No signs of infection or wet gangrene. no L DP/PT signals, R PT mono, no R DP    LABS:                        8.2    10.62 )-----------( 309      ( 24 Dec 2022 05:38 )             24.1     12-24    136  |  103  |  10  ----------------------------<  94  3.8   |  25  |  0.72    Ca    7.7<L>      24 Dec 2022 05:38  Phos  2.3     12-24  Mg     2.1     12-24      PT/INR - ( 22 Dec 2022 14:30 )   PT: 16.2 sec;   INR: 1.36          PTT - ( 22 Dec 2022 14:30 )  PTT:29.1 sec    A/P: 76 y/o M with Significant PMHx of IVDU found unresponsive at his nursing home, resolved after Narcan in the field, and brought to [facility]. Found to have PE, atrial flutter, and large LV thrombus on echo. Transferred to [facility] for further management. Pt C/o abdominal pain on 12/10 CTA showing mid SMA with embolus. Abnormal distal small bowel loops and cecum with dilatation and pneumatosis suggesting infarcted bowel. One or two tiny foci of  intrahepatic portal vein pneumatosis. Segmental infarction upper pole left kidney. Now s/p Ex lap, SMA embolectomy, 80cm SBR, abthera vac left in discontinuity (12/11) and transferred to SICU intubated. S/p second look (12/13) and most recently s/p OR for 3rd look, end-to-end anastomosis of remaining bowel, loop ileostomy and abdomen closure (12/15). Remains in SICU now extubated with acute limb ischemia to LLE pending amputation (BKA vs. AKA depending on whether it is felt a BKA would heal successfully), now off pressors. No current signs of infection or wet gangrene.     Plan:   - Examined leg this am. Dry gangrene of toes and part of foot. There is soft edema from the foot to the need with no induration, fluctuation, or any sign of infection. There is no concern for wet gangrene at this time. Continue local wound care and will await for leg to further demarcate as well as patients clinical status to improve. Will likely need an amputation in the future.  - Rest of care per SICU  - Vascular surgery Team 3C will continue to follow. Please call [phone] with any questions or concerns.

## 2022-12-24 NOTE — PROGRESS NOTE ADULT - ASSESSMENT
76 y/o M with Significant PMHx of IVDU found unresponsive at his nursing home, resolved after Narcan in the field, and brought to [facility]. Found to have PE, atrial flutter, and large LV thrombus on echo. Transferred to [facility] for further management. Pt C/o abdominal pain on 12/10 CTA showing mid SMA with embolus. Abnormal distal small bowel loops and cecum with dilatation and pneumatosis suggesting infarcted bowel. One or two tiny foci of  intrahepatic portal vein pneumatosis. Segmental infarction upper pole left kidney. Now s/p Ex lap, SMA embolectomy, 80cm SBR, abthera vac left in discontinuity (12/11) and transferred to SICU intubated. S/p second look (12/13) and most recently s/p OR for 3rd look, end-to-end anastomosis of remaining bowel, loop ileostomy and abdomen closure (12/15). Remains in SICU now extubated with acute limb ischemia to LLE pending amputation and AC held given BRBPR and hematoma.    Neuro: Pain: IV Tylenol ATC, Oxycodone PRN (weaning Narcotics to avoid withdrawal)  Psych: Depressed mood. No longer agitated or suicidal.   CV: Septic shock--resolved; AFlutter s/p Amio gtt and Digoxin: Metop 12.5 BID resumed; TTE (12/7) CHF- Severe RV dysfxn and LVEF 15%, holding Spironolactone and Valsartan in setting of bleeding. LV thrombus w LLE limb ischemia- Heparin gtt held (goal PTT 60-90). ASA 81 held. Continue to hold per SGY. CVP1, Pt is euvolemic with total body overload in soft tissues. Auto-diuresing and net neg 800cc without medications today.  Pulm: on room air  GI: Advanced to CLD , PPI. Ileostomy high output--improved, off imodium.  : Voids- Infarction of upper pole of Left Kidney, Renal US negative on 12/12.   ID: Off ABX // Dry gangrene no ABX.  Ischemic bowel: Zosyn (12/11-12/21), LLE wounds Vanc (12-19-21).   Heme: Hep C positive; Active T&S; Hg goal > 8  Endo: mISS, Hypoglycemia: D10 at 20cc/hr  PPx: SCD, .  Lines:PIVs D/c: Rt TLC (12/11-12/17), L radial connie (12/11-12/20), CathFlo to RIJ successful in dislodging clog.

## 2022-12-24 NOTE — PROGRESS NOTE ADULT - SUBJECTIVE AND OBJECTIVE BOX
INTERVAL/OVERNIGHT EVENTS:     SUBJECTIVE:     POD #  SICU Day #    Neurologic Medications  acetaminophen     Tablet .. 650 milliGRAM(s) Oral every 6 hours PRN Temp greater or equal to 38C (100.4F), Mild Pain (1 - 3)  oxyCODONE    Solution 5 milliGRAM(s) Oral every 4 hours PRN Severe Pain (7 - 10)    Respiratory Medications    Cardiovascular Medications    Gastrointestinal Medications  dextrose 10%. 1000 milliLiter(s) IV Continuous <Continuous>  dextrose 5%. 1000 milliLiter(s) IV Continuous <Continuous>  dextrose 5%. 1000 milliLiter(s) IV Continuous <Continuous>  pantoprazole  Injectable 40 milliGRAM(s) IV Push two times a day    Genitourinary Medications    Hematologic/Oncologic Medications  alteplase for catheter clearance 2 milliGRAM(s) Catheter once  influenza  Vaccine (HIGH DOSE) 0.7 milliLiter(s) IntraMuscular once    Antimicrobial/Immunologic Medications    Endocrine/Metabolic Medications  dextrose 50% Injectable 25 Gram(s) IV Push once  dextrose 50% Injectable 12.5 Gram(s) IV Push once  dextrose 50% Injectable 25 Gram(s) IV Push once  dextrose Oral Gel 15 Gram(s) Oral once PRN Blood Glucose LESS THAN 70 milliGRAM(s)/deciliter  glucagon  Injectable 1 milliGRAM(s) IntraMuscular once  insulin lispro (ADMELOG) corrective regimen sliding scale   SubCutaneous every 6 hours    Topical/Other Medications  chlorhexidine 2% Cloths 1 Application(s) Topical <User Schedule>  silver sulfADIAZINE 1% Cream 1 Application(s) Topical daily      MEDICATIONS  (PRN):  acetaminophen     Tablet .. 650 milliGRAM(s) Oral every 6 hours PRN Temp greater or equal to 38C (100.4F), Mild Pain (1 - 3)  dextrose Oral Gel 15 Gram(s) Oral once PRN Blood Glucose LESS THAN 70 milliGRAM(s)/deciliter  oxyCODONE    Solution 5 milliGRAM(s) Oral every 4 hours PRN Severe Pain (7 - 10)      I&O's Detail    23 Dec 2022 07:01  -  24 Dec 2022 07:00  --------------------------------------------------------  IN:    dextrose 10%: 1200 mL    IV PiggyBack: 312.5 mL    IV PiggyBack: 400 mL    PRBCs (Packed Red Blood Cells): 200 mL  Total IN: 2112.5 mL    OUT:    Ileostomy (mL): 1675 mL    Indwelling Catheter - Urethral (mL): 2015 mL    Vasopressin: 0 mL  Total OUT: 3690 mL    Total NET: -1577.5 mL      24 Dec 2022 07:01  -  24 Dec 2022 13:15  --------------------------------------------------------  IN:    dextrose 10%: 250 mL    IV PiggyBack: 187.5 mL    Oral Fluid: 100 mL  Total IN: 537.5 mL    OUT:    Ileostomy (mL): 400 mL    Indwelling Catheter - Urethral (mL): 640 mL  Total OUT: 1040 mL    Total NET: -502.5 mL          Vital Signs Last 24 Hrs  T(C): 37.1 (24 Dec 2022 09:00), Max: 37.3 (24 Dec 2022 00:01)  T(F): 98.8 (24 Dec 2022 09:00), Max: 99.1 (24 Dec 2022 00:01)  HR: 109 (24 Dec 2022 12:00) (96 - 129)  BP: 162/80 (24 Dec 2022 07:00) (107/55 - 162/80)  BP(mean): 106 (24 Dec 2022 07:00) (75 - 113)  RR: 12 (24 Dec 2022 12:00) (10 - 21)  SpO2: 100% (24 Dec 2022 12:00) (96% - 100%)    Parameters below as of 24 Dec 2022 12:00  Patient On (Oxygen Delivery Method): room air        GENERAL: NAD, resting comfortably in bed  HEENT: NCAT, MMM  C/V: Normal rate, normal peripheral perfusion  PULM: Nonlabored breathing, no respiratory distress,   ABD: Soft, ND, NT, no rebound tenderness, no guarding  EXTREM: WWP, no edema, SCDs in place  NEURO: No focal deficits    LABS:                        8.2    10.62 )-----------( 309      ( 24 Dec 2022 05:38 )             24.1     12-24    136  |  103  |  10  ----------------------------<  94  3.8   |  25  |  0.72    Ca    7.7<L>      24 Dec 2022 05:38  Phos  2.3     12-24  Mg     2.1     12-24      PT/INR - ( 22 Dec 2022 14:30 )   PT: 16.2 sec;   INR: 1.36          PTT - ( 22 Dec 2022 14:30 )  PTT:29.1 sec      RADIOLOGY & ADDITIONAL STUDIES:     INTERVAL/OVERNIGHT EVENTS: Narcotics discontinued, MN Hg 8.0.     SUBJECTIVE: This AM is doing well without pain or complaints. Just wants to sleep and be left alone. No CP/SOB. No abd pain or N/V. Per RN, TLC blue port is clogged.     Neurologic Medications  acetaminophen     Tablet .. 650 milliGRAM(s) Oral every 6 hours PRN Temp greater or equal to 38C (100.4F), Mild Pain (1 - 3)  oxyCODONE    Solution 5 milliGRAM(s) Oral every 4 hours PRN Severe Pain (7 - 10)    Gastrointestinal Medications  pantoprazole  Injectable 40 milliGRAM(s) IV Push two times a day    Hematologic/Oncologic Medications  alteplase for catheter clearance 2 milliGRAM(s) Catheter once  influenza  Vaccine (HIGH DOSE) 0.7 milliLiter(s) IntraMuscular once    Endocrine/Metabolic Medications  insulin lispro (ADMELOG) corrective regimen sliding scale   SubCutaneous every 6 hours    Topical/Other Medications  chlorhexidine 2% Cloths 1 Application(s) Topical <User Schedule>  silver sulfADIAZINE 1% Cream 1 Application(s) Topical daily    MEDICATIONS  (PRN):  acetaminophen     Tablet .. 650 milliGRAM(s) Oral every 6 hours PRN Temp greater or equal to 38C (100.4F), Mild Pain (1 - 3)  dextrose Oral Gel 15 Gram(s) Oral once PRN Blood Glucose LESS THAN 70 milliGRAM(s)/deciliter  oxyCODONE    Solution 5 milliGRAM(s) Oral every 4 hours PRN Severe Pain (7 - 10)    I&O's Detail    23 Dec 2022 07:01  -  24 Dec 2022 07:00  --------------------------------------------------------  IN:    dextrose 10%: 1200 mL    IV PiggyBack: 312.5 mL    IV PiggyBack: 400 mL    PRBCs (Packed Red Blood Cells): 200 mL  Total IN: 2112.5 mL    OUT:    Ileostomy (mL): 1675 mL    Indwelling Catheter - Urethral (mL): 2015 mL    Vasopressin: 0 mL  Total OUT: 3690 mL    Total NET: -1577.5 mL    24 Dec 2022 07:01  -  24 Dec 2022 13:15  --------------------------------------------------------  IN:    dextrose 10%: 250 mL    IV PiggyBack: 187.5 mL    Oral Fluid: 100 mL  Total IN: 537.5 mL    OUT:    Ileostomy (mL): 400 mL    Indwelling Catheter - Urethral (mL): 640 mL  Total OUT: 1040 mL    Total NET: -502.5 mL    Vital Signs Last 24 Hrs  T(C): 37.1 (24 Dec 2022 09:00), Max: 37.3 (24 Dec 2022 00:01)  T(F): 98.8 (24 Dec 2022 09:00), Max: 99.1 (24 Dec 2022 00:01)  HR: 109 (24 Dec 2022 12:00) (96 - 129)  BP: 162/80 (24 Dec 2022 07:00) (107/55 - 162/80)  BP(mean): 106 (24 Dec 2022 07:00) (75 - 113)  RR: 12 (24 Dec 2022 12:00) (10 - 21)  SpO2: 100% (24 Dec 2022 12:00) (96% - 100%)    Parameters below as of 24 Dec 2022 12:00  Patient On (Oxygen Delivery Method): room air    General: Cachetic elderly male, appears stated age, responsive to questions  Neuro: Grossly intact bilaterally   HEENT: Normocephalic, atraumatic, trachea midline, no JVD   Chest: Equal rise and fall of chest   Heart: Regular S1/S2, atrial flutter   Lungs: Unlabored breathing on room air; Clear to auscultation bilaterally, no adventitious sounds   Abdomen: Soft, non-distended, normoactive bowel sounds throughout, no tenderness to palpation in all 4 quadrants; RLQ ostomy pink with thin liquid, midline laparotomy incision is clean/dry/intact    Upper Extremities: Trace edema in hands, freely mobile bilaterally   Lower Extremities: dependent edema, SCDs in place on RLE; RLE warm, LLE cool with ruptured blisters from upper calf to toes   Neuro: Severe sensory and motor deficits in LLE. Absent sensation in left toes/foot  Skin: Warm except for LLE, non-diaphoretic throughout     LABS:    LABS:                        8.2    10.62 )-----------( 309      ( 24 Dec 2022 05:38 )             24.1     12-24    136  |  103  |  10  ----------------------------<  94  3.8   |  25  |  0.72    Ca    7.7<L>      24 Dec 2022 05:38  Phos  2.3     12-24  Mg     2.1     12-24    PT/INR - ( 22 Dec 2022 14:30 )   PT: 16.2 sec;   INR: 1.36        PTT - ( 22 Dec 2022 14:30 )  PTT:29.1 sec

## 2022-12-24 NOTE — PROGRESS NOTE ADULT - SUBJECTIVE AND OBJECTIVE BOX
Patient seen and examined at bedside.    Overnight Events: atrial flutter, rates 100s    Review Of Systems: No chest pain, shortness of breath, or palpitations            Current Meds:  acetaminophen     Tablet .. 650 milliGRAM(s) Oral every 6 hours PRN  alteplase for catheter clearance 2 milliGRAM(s) Catheter once  chlorhexidine 2% Cloths 1 Application(s) Topical <User Schedule>  dextrose 10%. 1000 milliLiter(s) IV Continuous <Continuous>  dextrose 5%. 1000 milliLiter(s) IV Continuous <Continuous>  dextrose 5%. 1000 milliLiter(s) IV Continuous <Continuous>  dextrose 50% Injectable 25 Gram(s) IV Push once  dextrose 50% Injectable 12.5 Gram(s) IV Push once  dextrose 50% Injectable 25 Gram(s) IV Push once  dextrose Oral Gel 15 Gram(s) Oral once PRN  glucagon  Injectable 1 milliGRAM(s) IntraMuscular once  influenza  Vaccine (HIGH DOSE) 0.7 milliLiter(s) IntraMuscular once  insulin lispro (ADMELOG) corrective regimen sliding scale   SubCutaneous every 6 hours  oxyCODONE    Solution 5 milliGRAM(s) Oral every 4 hours PRN  pantoprazole  Injectable 40 milliGRAM(s) IV Push two times a day  silver sulfADIAZINE 1% Cream 1 Application(s) Topical daily      Vitals:  T(F): 98.8 (12-24), Max: 99.1 (12-24)  HR: 109 (12-24) (96 - 129)  BP: 162/80 (12-24) (107/55 - 162/80)  RR: 12 (12-24)  SpO2: 100% (12-24)  I&O's Summary    23 Dec 2022 07:01  -  24 Dec 2022 07:00  --------------------------------------------------------  IN: 2112.5 mL / OUT: 3690 mL / NET: -1577.5 mL    24 Dec 2022 07:01  -  24 Dec 2022 12:36  --------------------------------------------------------  IN: 537.5 mL / OUT: 1040 mL / NET: -502.5 mL        Physical Exam:  Appearance: No acute distress; well appearing  Eyes: PERRL, EOMI, pink conjunctiva  HEENT: Normal oral mucosa  Cardiovascular: irregular normal S1, S2, no murmurs, rubs, or gallops; no edema; no JVD  Respiratory: Clear to auscultation bilaterally  Gastrointestinal: soft, non-tender, non-distended with normal bowel sounds  Musculoskeletal: No clubbing; no joint deformity   Neurologic: Non-focal  Lymphatic: No lymphadenopathy  Psychiatry: AAOx3, mood & affect appropriate  Skin: No rashes, ecchymoses, or cyanosis                          8.2    10.62 )-----------( 309      ( 24 Dec 2022 05:38 )             24.1     12-24    136  |  103  |  10  ----------------------------<  94  3.8   |  25  |  0.72    Ca    7.7<L>      24 Dec 2022 05:38  Phos  2.3     12-24  Mg     2.1     12-24      PT/INR - ( 22 Dec 2022 14:30 )   PT: 16.2 sec;   INR: 1.36          PTT - ( 22 Dec 2022 14:30 )  PTT:29.1 sec  CARDIAC MARKERS ( 20 Dec 2022 04:58 )  x     / x     / x     / 948 U/L / x     / x      CARDIAC MARKERS ( 19 Dec 2022 05:30 )  x     / x     / x     / 1143 U/L / x     / x      CARDIAC MARKERS ( 18 Dec 2022 10:23 )  x     / x     / x     / 1525 U/L / x     / 9.1 ng/mL  CARDIAC MARKERS ( 18 Dec 2022 05:47 )  x     / x     / 0.04 ng/mL / 1809 U/L / x     / x      CARDIAC MARKERS ( 17 Dec 2022 20:36 )  x     / x     / 0.04 ng/mL / 1834 U/L / x     / 13.4 ng/mL        Interpretation of Telemetry: atrial flutter rates 100s

## 2022-12-24 NOTE — PROGRESS NOTE ADULT - ATTENDING COMMENTS
Attending covering for Dr. Lanier.  Patient seen and examined at bedside in SICU.  Agree with above.  Abdomen soft, NT ND.    Supportive care as per SICU.  No heparin for now given recent hematoma and drop in H/H.

## 2022-12-24 NOTE — PROGRESS NOTE ADULT - ASSESSMENT
78yo M with no significant PMH/PSx admitted to cardiology service on 12/7 in the setting of severely reduced LV function 2/2 an LV thrombus. Pt now with several days of post-prandial abdominal pain and loose BMs, initially suspected to be 2/2 opioid withdrawal, however on evening of 12/10 had a large bloody BM. 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 Vascular surgery (already following) with plan for OR. General surgery consulted for possible operative assistance in the setting of bowel ischemia. Now POD2 s/p ex lap, SMA embolectomy and small bowel resection. Transferred to CCU on 12/21/22 for cardiac optimization and now transferred back to SICU 12/22/22 for bleeding hemodynamic instability. CTA performed demonstrating large hematoma in R abdomen, new hemoperitoneum, and bilateral pleural effusions.     Plan:  Trend CBC, lactate  Holding SQH due to new hemoperitoneum findings  Serial abdominal exams  CLD/IVF  Rest of care per SICU

## 2022-12-24 NOTE — PROGRESS NOTE ADULT - ATTENDING COMMENTS
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 Was off GDMT 2/2 hypotension from hemorrhagic shock, now significantly improved, lactate cleared, off pressors, and hypertensive with some pitting edema. Will check CVP and d/w cardiology resuming GDMT slowly. Has not yet tolerated A/C, will consider retrying in next few days if H/H remains stable. Acute limb ischemia will demarcate, no intervention per vascular at this time .

## 2022-12-24 NOTE — PROGRESS NOTE ADULT - SUBJECTIVE AND OBJECTIVE BOX
STATUS POST:    12/11: Ex lap, SMA embolectomy, 80cm SBR, abthera vac  12/13: Second look, SBR  12/15: Ex-lap, no dead gut, DANIEL of discontinuous gut, loop ileostomy; EBL minimal, 1L crystalloid, UOP 150mL     SUBJECTIVE: Pt seen and examined at bedside this am by surgery team. No complaints this morning.     MEDICATIONS  (STANDING):  alteplase for catheter clearance 2 milliGRAM(s) Catheter once  chlorhexidine 2% Cloths 1 Application(s) Topical <User Schedule>  dextrose 10%. 1000 milliLiter(s) (50 mL/Hr) IV Continuous <Continuous>  dextrose 5%. 1000 milliLiter(s) (100 mL/Hr) IV Continuous <Continuous>  dextrose 5%. 1000 milliLiter(s) (50 mL/Hr) IV Continuous <Continuous>  dextrose 50% Injectable 25 Gram(s) IV Push once  dextrose 50% Injectable 12.5 Gram(s) IV Push once  dextrose 50% Injectable 25 Gram(s) IV Push once  glucagon  Injectable 1 milliGRAM(s) IntraMuscular once  influenza  Vaccine (HIGH DOSE) 0.7 milliLiter(s) IntraMuscular once  insulin lispro (ADMELOG) corrective regimen sliding scale   SubCutaneous every 6 hours  pantoprazole  Injectable 40 milliGRAM(s) IV Push two times a day  silver sulfADIAZINE 1% Cream 1 Application(s) Topical daily    MEDICATIONS  (PRN):  acetaminophen     Tablet .. 650 milliGRAM(s) Oral every 6 hours PRN Temp greater or equal to 38C (100.4F), Mild Pain (1 - 3)  dextrose Oral Gel 15 Gram(s) Oral once PRN Blood Glucose LESS THAN 70 milliGRAM(s)/deciliter  oxyCODONE    Solution 5 milliGRAM(s) Oral every 4 hours PRN Severe Pain (7 - 10)      Vital Signs Last 24 Hrs  T(C): 37.1 (24 Dec 2022 09:00), Max: 37.3 (24 Dec 2022 00:01)  T(F): 98.8 (24 Dec 2022 09:00), Max: 99.1 (24 Dec 2022 00:01)  HR: 109 (24 Dec 2022 12:00) (96 - 129)  BP: 162/80 (24 Dec 2022 07:00) (107/55 - 162/80)  BP(mean): 106 (24 Dec 2022 07:00) (75 - 113)  RR: 12 (24 Dec 2022 12:00) (10 - 21)  SpO2: 100% (24 Dec 2022 12:00) (93% - 100%)    Parameters below as of 24 Dec 2022 12:00  Patient On (Oxygen Delivery Method): room air    Physical Exam  General: Patient is doing well and lying in bed comfortably  Constitutional: alert and oriented   Pulm: Nonlabored breathing, no respiratory distress  CV: Regular rate and rhythm, normal sinus rhythm  Abd:  soft, nontender, nondistended. No rebound, no guarding. Ostomy, ppp with liquid stool      I&O's Detail    23 Dec 2022 07:01  -  24 Dec 2022 07:00  --------------------------------------------------------  IN:    dextrose 10%: 1200 mL    IV PiggyBack: 312.5 mL    IV PiggyBack: 400 mL    PRBCs (Packed Red Blood Cells): 200 mL  Total IN: 2112.5 mL    OUT:    Ileostomy (mL): 1675 mL    Indwelling Catheter - Urethral (mL): 2015 mL    Vasopressin: 0 mL  Total OUT: 3690 mL    Total NET: -1577.5 mL      24 Dec 2022 07:01  -  24 Dec 2022 12:18  --------------------------------------------------------  IN:    dextrose 10%: 250 mL    IV PiggyBack: 187.5 mL    Oral Fluid: 100 mL  Total IN: 537.5 mL    OUT:    Ileostomy (mL): 400 mL    Indwelling Catheter - Urethral (mL): 640 mL  Total OUT: 1040 mL    Total NET: -502.5 mL        LABS:                        8.2    10.62 )-----------( 309      ( 24 Dec 2022 05:38 )             24.1     12-24    136  |  103  |  10  ----------------------------<  94  3.8   |  25  |  0.72    Ca    7.7<L>      24 Dec 2022 05:38  Phos  2.3     12-24  Mg     2.1     12-24      PT/INR - ( 22 Dec 2022 14:30 )   PT: 16.2 sec;   INR: 1.36          PTT - ( 22 Dec 2022 14:30 )  PTT:29.1 sec

## 2022-12-25 NOTE — PROGRESS NOTE ADULT - ASSESSMENT
78 y/o M with no significant PMHx found unresponsive at his nursing home, resolved after narcan in the field, and brought to [facility]. Found to have LV dysfunction (suspected Tachy mediated), sub-segmental PE, atrial flutter, and large LV thrombus on echo. Transferred to [facility] for further management. Course complicated by acute mesenteric ischemia now s/p embolectomy and bowel resection, septic shock, LLE arterial occlusion, and planned for DCCV however hospital course further c/b GIB / hemorrhagic shock so DCCV was aborted.       #Atrial flutter  -NPO given GIB/hemorrhagic shock/hemoperitoneum  -would allow degree of tachycardia in setting of above  -metoprolol 5mg IVP PRN only if sustaining HR > 130BPM and no other active acute pathology driving tachycardia  -once can tolerate PO, reintroduce Toprol XL 25mg daily  -AC once able from surgical perspective  -will attempt rhythm control only after able to tolerate AC

## 2022-12-25 NOTE — PROGRESS NOTE ADULT - ATTENDING COMMENTS
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 Was off GDMT 2/2 hypotension from hemorrhagic shock, now significantly improved, lactate cleared, off pressors, and hypertensive with some pitting edema. CVP remains significantly low and ostomy output > 3L, will hold off on diuresis. Reintroduced metoprolol per GI recs. Given net neg 3.5L and CVP 0, will slowly give back 500cc IVF. Start imodium for high output. Has not yet tolerated A/C, will consider retrying in next few days if H/H remains stable. Acute limb ischemia will demarcate, no intervention per vascular at this time . Discuss w/ surgery advancing diet given poor PO intake/poor appetite on clears. TOV. 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 Was off GDMT 2/2 hypotension from hemorrhagic shock, now significantly improved, lactate cleared, off pressors, and hypertensive with some pitting edema. CVP remains significantly low and ostomy output > 3L. Reintroduced metoprolol per HF recs, though HR remains high. Given net neg 3.5L and CVP 0 and tachycardia, will slowly give back 500cc IVF. Start imodium for high output. Has not yet tolerated A/C, will consider retrying in next few days if H/H remains stable. Acute limb ischemia will demarcate, no intervention per vascular at this time . Discuss w/ surgery advancing diet given poor PO intake/poor appetite on clears. TOV.

## 2022-12-25 NOTE — PROGRESS NOTE ADULT - ASSESSMENT
A/P: 76 y/o M with Significant PMHx of IVDU found unresponsive at his nursing home, resolved after Narcan in the field, and brought to [facility]. Found to have PE, atrial flutter, and large LV thrombus on echo. Transferred to [facility] for further management. Pt C/o abdominal pain on 12/10 CTA showing mid SMA with embolus. Abnormal distal small bowel loops and cecum with dilatation and pneumatosis suggesting infarcted bowel. One or two tiny foci of  intrahepatic portal vein pneumatosis. Segmental infarction upper pole left kidney. Now s/p Ex lap, SMA embolectomy, 80cm SBR, abthera vac left in discontinuity (12/11) and transferred to SICU intubated. S/p second look (12/13) and most recently s/p OR for 3rd look, end-to-end anastomosis of remaining bowel, loop ileostomy and abdomen closure (12/15). Remains in SICU now extubated with acute limb ischemia to LLE pending amputation (BKA vs. AKA depending on whether it is felt a BKA would heal successfully), now off pressors. No current signs of infection or wet gangrene.     Plan:   - Examined leg this am and changed dressing. Dry gangrene of toes and part of foot with edema from the foot to the knee with no induration, fluctuation, or any sign of infection at this time. There is no concern for wet gangrene at this time. -Continue local wound care and will await for leg to further demarcate as well as patients clinical status to improve. Will likely need an amputation in the future.  - Rest of care per SICU  - Vascular surgery Team 3C will continue to follow. Please call [phone] with any questions or concerns.

## 2022-12-25 NOTE — PROGRESS NOTE ADULT - SUBJECTIVE AND OBJECTIVE BOX
STATUS POST:  12/11: Ex lap, SMA embolectomy, 80cm SBR, abthera vac  12/13: Second look, SBR  12/15: Ex-lap, no dead gut, DANIEL of discontinuous gut, loop ileostomy; EBL minimal, 1L crystalloid, UOP 150mL     SUBJECTIVE: Pt seen and examined at bedside this am by surgery team. High ostomy output. SICU started dronabinol and imodium was restarted. Patient is on CLD with ensures. No nausea/vomiting but poor appetite. CBC stable.     Vital Signs Last 24 Hrs  T(C): 37.6 (25 Dec 2022 12:00), Max: 37.6 (25 Dec 2022 09:00)  T(F): 99.7 (25 Dec 2022 12:00), Max: 99.7 (25 Dec 2022 12:00)  HR: 106 (25 Dec 2022 13:00) (106 - 131)  BP: 117/81 (24 Dec 2022 20:00) (117/68 - 133/81)  BP(mean): 93 (24 Dec 2022 20:00) (85 - 101)  RR: 16 (25 Dec 2022 13:00) (10 - 27)  SpO2: 98% (25 Dec 2022 13:00) (93% - 100%)    Parameters below as of 25 Dec 2022 13:00  Patient On (Oxygen Delivery Method): room air        PHYSICAL EXAM:   Gen: Awake, alert, NAD, resting comfortably   CV: RRR  Pulm: no respiratory distress on RA  Abd: soft, ND, NTTP, no rebound or guarding, ostomy functioning with stool, midline laparotomy incision c/d/i   Ext: CARLOS    I&O's Detail    24 Dec 2022 07:01  -  25 Dec 2022 07:00  --------------------------------------------------------  IN:    dextrose 10%: 690 mL    IV PiggyBack: 187.5 mL    Oral Fluid: 100 mL  Total IN: 977.5 mL    OUT:    Ileostomy (mL): 2950 mL    Indwelling Catheter - Urethral (mL): 1455 mL  Total OUT: 4405 mL    Total NET: -3427.5 mL      25 Dec 2022 07:01  -  25 Dec 2022 13:55  --------------------------------------------------------  IN:    dextrose 10%: 40 mL    dextrose 5% + lactated ringers: 240 mL    IV PiggyBack: 250 mL    Lactated Ringers Bolus: 500 mL  Total IN: 1030 mL    OUT:    Ileostomy (mL): 250 mL    Indwelling Catheter - Urethral (mL): 235 mL  Total OUT: 485 mL    Total NET: 545 mL          LABS:                        8.3    9.02  )-----------( 333      ( 25 Dec 2022 05:39 )             24.9     12-25    135  |  103  |  8   ----------------------------<  95  3.9   |  25  |  0.74    Ca    8.0<L>      25 Dec 2022 05:39  Phos  2.5     12-25  Mg     2.1     12-25        PT/INR - ( 25 Dec 2022 05:39 )   PT: 15.2 sec;   INR: 1.27          PTT - ( 25 Dec 2022 05:39 )  PTT:28.1 sec  CAPILLARY BLOOD GLUCOSE      POCT Blood Glucose.: 109 mg/dL (25 Dec 2022 13:38)  POCT Blood Glucose.: 77 mg/dL (25 Dec 2022 11:24)  POCT Blood Glucose.: 105 mg/dL (24 Dec 2022 23:47)  POCT Blood Glucose.: 105 mg/dL (24 Dec 2022 16:24)      RADIOLOGY & ADDITIONAL STUDIES:

## 2022-12-25 NOTE — PROGRESS NOTE ADULT - ASSESSMENT
77M with PMHx of IVDU found unresponsive at his nursing home, resolved after Narcan in the field and brought to [facility]. Found to have PE, atrial flutter, and large LV thrombus on echo. Transferred to [facility] for further management. Pt c/o abdominal pain on 12/10 CTA showing mid SMA with embolus. Abnormal distal small bowel loops and cecum with dilatation and pneumatosis suggesting infarcted bowel. One or two tiny foci of intrahepatic portal vein pneumatosis. Segmental infarction upper pole left kidney. Now s/p Ex lap, SMA embolectomy, 80cm SBR, abthera vac left in discontinuity (12/11) and transferred to SICU intubated. S/p second look (12/13) and most recently s/p OR for 3rd look, end-to-end anastomosis of remaining bowel, loop ileostomy and abdomen closure (12/15). Remains in SICU, now extubated with still with limb ischemia to LLE pending amputation and AC held given BRBPR and hematoma.    Neuro: Pain: IV Tylenol ATC  Psych: Depressed mood. No longer agitated or suicidal.   CV: Septic shock--resolved; AFlutter s/p Amio gtt and Digoxin: Metop 12.5 BID resumed yesterday; TTE (12/7) CHF- Severe RV dysfxn and LVEF 15%, holding Spironolactone and Valsartan. LV thrombus w LLE limb ischemia- Heparin gtt held (goal PTT 60-90). ASA 81 held. Continue to hold per SGY.   - CVP 0 to -5 and net neg 3.5L, suggestive of hypovolemia in setting of high ostomy output. Bolus IVF 500cc LR and increase fluids to D5LR at 60cc.   Pulm: on room air  GI: CLD but poor appetite, PPI. Ileostomy high output--start imodium 2QD.  : Voids- Infarction of upper pole of Left Kidney, Renal US negative on 12/12. Uriarte.   ID: Off ABX // Dry gangrene no ABX.  Ischemic bowel: Zosyn (12/11-12/21), LLE wounds Vanc (12-19-21).   Heme: Hep C positive; Active T&S; Hg goal > 8  Endo: mISS, Hypoglycemia: D5LR @60  PPx: SCD, .  Lines:PIVs D/c: Rt TLC (12/11-12/17), L radial connie (12/11-12/20).

## 2022-12-25 NOTE — PROGRESS NOTE ADULT - SUBJECTIVE AND OBJECTIVE BOX
SUBJECTIVE: Seen and examined at bedside. Betadine was applied to LLE toes and lower leg and dressing was changed. Pt had no acute events overnight. No fevers or leukocytosis. Pt states he does not sleep well at the hospital but no acute complaints. Denied any LLE pain.       MEDICATIONS  (STANDING):  chlorhexidine 2% Cloths 1 Application(s) Topical <User Schedule>  dextrose 10%. 1000 milliLiter(s) (20 mL/Hr) IV Continuous <Continuous>  dextrose 5%. 1000 milliLiter(s) (100 mL/Hr) IV Continuous <Continuous>  dextrose 5%. 1000 milliLiter(s) (50 mL/Hr) IV Continuous <Continuous>  dextrose 50% Injectable 25 Gram(s) IV Push once  dextrose 50% Injectable 12.5 Gram(s) IV Push once  dextrose 50% Injectable 25 Gram(s) IV Push once  glucagon  Injectable 1 milliGRAM(s) IntraMuscular once  influenza  Vaccine (HIGH DOSE) 0.7 milliLiter(s) IntraMuscular once  insulin lispro (ADMELOG) corrective regimen sliding scale   SubCutaneous every 6 hours  loperamide 2 milliGRAM(s) Oral daily  metoprolol tartrate 12.5 milliGRAM(s) Oral every 12 hours  pantoprazole  Injectable 40 milliGRAM(s) IV Push two times a day  potassium phosphate IVPB 15 milliMole(s) IV Intermittent once  silver sulfADIAZINE 1% Cream 1 Application(s) Topical daily    MEDICATIONS  (PRN):  acetaminophen     Tablet .. 650 milliGRAM(s) Oral every 6 hours PRN Temp greater or equal to 38C (100.4F), Mild Pain (1 - 3)  dextrose Oral Gel 15 Gram(s) Oral once PRN Blood Glucose LESS THAN 70 milliGRAM(s)/deciliter      Vital Signs Last 24 Hrs  T(C): 37 (25 Dec 2022 04:53), Max: 37.1 (25 Dec 2022 01:01)  T(F): 98.6 (25 Dec 2022 04:53), Max: 98.7 (25 Dec 2022 01:01)  HR: 127 (25 Dec 2022 08:00) (101 - 131)  BP: 117/81 (24 Dec 2022 20:00) (117/68 - 135/81)  BP(mean): 93 (24 Dec 2022 20:00) (85 - 102)  RR: 18 (25 Dec 2022 08:00) (10 - 18)  SpO2: 97% (25 Dec 2022 08:00) (93% - 100%)    Parameters below as of 25 Dec 2022 08:00  Patient On (Oxygen Delivery Method): room air        Physical Exam:  General: NAD, resting comfortably in bed  Pulmonary: Nonlabored breathing, no respiratory distress  Cardiovascular: NSR  Abd: soft, ileostomy pink with red rubber in place, ostomy bag with brown liquid output   Extremities: Toes of LLE w/ dry gangrene. Large bullae of left calf dressed in kerlix with no drainage from the desquamated patches. Diffuse mild edema of LLE. LLE cool. No signs of infection or wet gangrene. no L DP/PT signals    I&O's Summary    24 Dec 2022 07:01  -  25 Dec 2022 07:00  --------------------------------------------------------  IN: 977.5 mL / OUT: 4405 mL / NET: -3427.5 mL    25 Dec 2022 07:01  -  25 Dec 2022 09:01  --------------------------------------------------------  IN: 20 mL / OUT: 25 mL / NET: -5 mL        LABS:                        8.3    9.02  )-----------( 333      ( 25 Dec 2022 05:39 )             24.9     12-25    135  |  103  |  8   ----------------------------<  95  3.9   |  25  |  0.74    Ca    8.0<L>      25 Dec 2022 05:39  Phos  2.5     12-25  Mg     2.1     12-25      PT/INR - ( 25 Dec 2022 05:39 )   PT: 15.2 sec;   INR: 1.27          PTT - ( 25 Dec 2022 05:39 )  PTT:28.1 sec    CAPILLARY BLOOD GLUCOSE      POCT Blood Glucose.: 105 mg/dL (24 Dec 2022 23:47)  POCT Blood Glucose.: 105 mg/dL (24 Dec 2022 16:24)  POCT Blood Glucose.: 86 mg/dL (24 Dec 2022 11:10)        RADIOLOGY & ADDITIONAL STUDIES:

## 2022-12-25 NOTE — PROGRESS NOTE ADULT - SUBJECTIVE AND OBJECTIVE BOX
INTERVAL/OVERNIGHT EVENTS: NAEO.     SUBJECTIVE: This AM is tired but otherwise doing well. Has decreased appetite, but no N/V or abd pain with CLD. Ostomy functional and voids via bowen. No CP/SOB.    Neurologic Medications  acetaminophen     Tablet .. 650 milliGRAM(s) Oral every 6 hours PRN Temp greater or equal to 38C (100.4F), Mild Pain (1 - 3)    Cardiovascular Medications  metoprolol tartrate 12.5 milliGRAM(s) Oral every 12 hours    Gastrointestinal Medications  loperamide 2 milliGRAM(s) Oral daily  pantoprazole  Injectable 40 milliGRAM(s) IV Push two times a day    Hematologic/Oncologic Medications  influenza  Vaccine (HIGH DOSE) 0.7 milliLiter(s) IntraMuscular once    Endocrine/Metabolic Medications  insulin lispro (ADMELOG) corrective regimen sliding scale   SubCutaneous every 6 hours    Topical/Other Medications  chlorhexidine 2% Cloths 1 Application(s) Topical <User Schedule>  silver sulfADIAZINE 1% Cream 1 Application(s) Topical daily    MEDICATIONS  (PRN):  acetaminophen     Tablet .. 650 milliGRAM(s) Oral every 6 hours PRN Temp greater or equal to 38C (100.4F), Mild Pain (1 - 3)  dextrose Oral Gel 15 Gram(s) Oral once PRN Blood Glucose LESS THAN 70 milliGRAM(s)/deciliter    I&O's Detail    24 Dec 2022 07:01  -  25 Dec 2022 07:00  --------------------------------------------------------  IN:    dextrose 10%: 690 mL    IV PiggyBack: 187.5 mL    Oral Fluid: 100 mL  Total IN: 977.5 mL    OUT:    Ileostomy (mL): 2950 mL    Indwelling Catheter - Urethral (mL): 1455 mL  Total OUT: 4405 mL    Total NET: -3427.5 mL    25 Dec 2022 07:01  -  25 Dec 2022 09:52  --------------------------------------------------------  IN:    dextrose 10%: 20 mL  Total IN: 20 mL    OUT:    Indwelling Catheter - Urethral (mL): 25 mL  Total OUT: 25 mL    Total NET: -5 mL    Vital Signs Last 24 Hrs  T(C): 37.6 (25 Dec 2022 09:00), Max: 37.6 (25 Dec 2022 09:00)  T(F): 99.6 (25 Dec 2022 09:00), Max: 99.6 (25 Dec 2022 09:00)  HR: 127 (25 Dec 2022 09:00) (101 - 131)  BP: 117/81 (24 Dec 2022 20:00) (117/68 - 135/81)  BP(mean): 93 (24 Dec 2022 20:00) (85 - 102)  RR: 16 (25 Dec 2022 09:00) (10 - 18)  SpO2: 98% (25 Dec 2022 09:00) (93% - 100%)    General: Cachetic elderly male, appears stated age, responsive to questions, flat affect  Neuro: Grossly intact bilaterally   HEENT: Normocephalic, atraumatic, trachea midline, no JVD   Heart: Regular S1/S2, atrial flutter   Lungs: Unlabored breathing on room air; Clear to auscultation bilaterally, no adventitious sounds   Abdomen: Soft, non-distended, normoactive bowel sounds throughout, no tenderness to palpation in all 4 quadrants; RLQ ostomy pink with thin brown liquid, midline laparotomy incision is clean/dry/intact    : Urine appears concentrated  Upper Extremities: Edema in hands, freely mobile bilaterally   Lower Extremities: Dependent edema, SCDs in place on RLE; RLE warm, LLE cool with ruptured blisters from upper calf to toes   Neuro: Severe sensory and motor deficits in LLE. Absent sensation in left toes/foot  Skin: Warm except for LLE, non-diaphoretic throughout     LABS:                        8.3    9.02  )-----------( 333      ( 25 Dec 2022 05:39 )             24.9     12-25    135  |  103  |  8   ----------------------------<  95  3.9   |  25  |  0.74    Ca    8.0<L>      25 Dec 2022 05:39  Phos  2.5     12-25  Mg     2.1     12-25    PT/INR - ( 25 Dec 2022 05:39 )   PT: 15.2 sec;   INR: 1.27        PTT - ( 25 Dec 2022 05:39 )  PTT:28.1 sec

## 2022-12-26 NOTE — PROGRESS NOTE ADULT - SUBJECTIVE AND OBJECTIVE BOX
General Surgery Progress Note    SUBJECTIVE:   Patient seen and examined at bedside by amairani during AM rounds.    Vital Signs Last 24 Hrs  T(C): 37.1 (26 Dec 2022 09:00), Max: 37.1 (26 Dec 2022 00:30)  T(F): 98.7 (26 Dec 2022 09:00), Max: 98.8 (26 Dec 2022 00:30)  HR: 133 (26 Dec 2022 13:00) (101 - 134)  BP: 106/61 (26 Dec 2022 11:10) (101/55 - 106/61)  BP(mean): 78 (26 Dec 2022 11:10) (73 - 78)  RR: 19 (26 Dec 2022 13:00) (11 - 20)  SpO2: 100% (26 Dec 2022 13:00) (92% - 100%)    Parameters below as of 26 Dec 2022 13:00  Patient On (Oxygen Delivery Method): room air        I&O's Summary    25 Dec 2022 07:01  -  26 Dec 2022 07:00  --------------------------------------------------------  IN: 2328 mL / OUT: 2845 mL / NET: -517 mL    26 Dec 2022 07:01  -  26 Dec 2022 14:17  --------------------------------------------------------  IN: 948 mL / OUT: 800 mL / NET: 148 mL        Physical Exam:  General: Resting comfortably in bed, NAD  HEENT: ATNC  Pulmonary: Nonlabored breathing, no respiratory distress, no acessory muscle use noted  Cardiovascular: NSR  Abdomen: Soft, nondisteded, appropriate incisional tenderness  Extremities: WWP, SCDs in place, No significant edema appreciated    LABS:                        8.5    8.56  )-----------( 337      ( 26 Dec 2022 05:30 )             26.2     12-26    136  |  103  |  7   ----------------------------<  105<H>  4.1   |  26  |  0.77    Ca    7.8<L>      26 Dec 2022 05:30  Phos  2.3     12-26  Mg     2.1     12-26      PT/INR - ( 26 Dec 2022 05:30 )   PT: 14.9 sec;   INR: 1.25          PTT - ( 26 Dec 2022 05:30 )  PTT:27.6 sec      Plan:  -NPO/IVF - LR @ 100  -Antibiotics -  -SQH and SCDs  -OOB as tolerated/IS  -AM labs General Surgery Progress Note    SUBJECTIVE:   Patient seen and examined at bedside by chief during AM rounds. Overnight patient noted to pass trial of void. Patient reports he is still in pain this AM, but is tolerating more liquid than prior. Denies nausea.    Vital Signs Last 24 Hrs  T(C): 37.1 (26 Dec 2022 09:00), Max: 37.1 (26 Dec 2022 00:30)  T(F): 98.7 (26 Dec 2022 09:00), Max: 98.8 (26 Dec 2022 00:30)  HR: 133 (26 Dec 2022 13:00) (101 - 134)  BP: 106/61 (26 Dec 2022 11:10) (101/55 - 106/61)  BP(mean): 78 (26 Dec 2022 11:10) (73 - 78)  RR: 19 (26 Dec 2022 13:00) (11 - 20)  SpO2: 100% (26 Dec 2022 13:00) (92% - 100%)    Parameters below as of 26 Dec 2022 13:00  Patient On (Oxygen Delivery Method): room air        I&O's Summary    25 Dec 2022 07:01  -  26 Dec 2022 07:00  --------------------------------------------------------  IN: 2328 mL / OUT: 2845 mL / NET: -517 mL    26 Dec 2022 07:01  -  26 Dec 2022 14:17  --------------------------------------------------------  IN: 948 mL / OUT: 800 mL / NET: 148 mL        Physical Exam:  General: Resting comfortably in bed, mildly uncomfortable  Pulmonary: Equal chest wall expansion b/l, no respiratory distress  Cardiovascular: Tachycardic - irregularly, irregular  Abdomen: Soft, nondistended nontender. Midline incision healing well with staples. Ostomy noted in RLQ with stool and gas  Extremities: No significant edema, dry gangrene of L toes    LABS:                        8.5    8.56  )-----------( 337      ( 26 Dec 2022 05:30 )             26.2     12-26    136  |  103  |  7   ----------------------------<  105<H>  4.1   |  26  |  0.77    Ca    7.8<L>      26 Dec 2022 05:30  Phos  2.3     12-26  Mg     2.1     12-26      PT/INR - ( 26 Dec 2022 05:30 )   PT: 14.9 sec;   INR: 1.25          PTT - ( 26 Dec 2022 05:30 )  PTT:27.6 sec

## 2022-12-26 NOTE — PROGRESS NOTE ADULT - ATTENDING COMMENTS
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 Was off GDMT 2/2 hypotension from hemorrhagic shock, now significantly improved, lactate cleared, off pressors, and hypertensive with some pitting edema. CVP remains significantly low and ostomy output high, holding on diuresis for now and increasing imodium. Relatively rate controlled on low dose metoprolol, will give PRN dose for HR > 130. CVP currently 5, but if output high and CVP 0-2 would consider another small fluid bolus to maintain CVP 5-7.  Has not yet tolerated A/C, trial of hep SQ today. Acute limb ischemia will demarcate, no intervention per vascular at this time, will ultimately require AKA . Appetite improved on Marinol. TOV.

## 2022-12-26 NOTE — PROGRESS NOTE ADULT - SUBJECTIVE AND OBJECTIVE BOX
S: Discussed amputation with patient. He is agreeable to proceed with surgery.     O:     ICU Vital Signs Last 24 Hrs  T(C): 37.1 (26 Dec 2022 09:00), Max: 37.6 (25 Dec 2022 12:00)  T(F): 98.7 (26 Dec 2022 09:00), Max: 99.7 (25 Dec 2022 12:00)  HR: 115 (26 Dec 2022 09:00) (101 - 129)  BP: --  BP(mean): --  ABP: 114/110 (26 Dec 2022 09:00) (109/66 - 140/67)  ABP(mean): 112 (26 Dec 2022 09:00) (74 - 112)  RR: 18 (26 Dec 2022 09:00) (11 - 27)  SpO2: 100% (26 Dec 2022 09:00) (92% - 100%)    O2 Parameters below as of 26 Dec 2022 08:00  Patient On (Oxygen Delivery Method): room air    Physical Exam:  General: NAD, resting comfortably in bed  Pulmonary: Nonlabored breathing, no respiratory distress  Cardiovascular: NSR  Abd: soft, ileostomy pink with red rubber in place, ostomy bag with brown liquid output   Extremities: Toes of LLE w/ dry gangrene. Large bullae of anterior and posterior left calf have opened, dressed in kerlix with serous drainage from the desquamated patches. Diffuse mild edema of LLE. LLE cool. No signs of infection or wet gangrene. No L DP/PT signals                          8.5    8.56  )-----------( 337      ( 26 Dec 2022 05:30 )             26.2   12-26    136  |  103  |  7   ----------------------------<  105<H>  4.1   |  26  |  0.77    Ca    7.8<L>      26 Dec 2022 05:30  Phos  2.3     12-26  Mg     2.1     12-26    A/P: 76 y/o M with Significant PMHx of IVDU found unresponsive at his nursing home, resolved after Narcan in the field, and brought to [facility]. Found to have PE, atrial flutter, and large LV thrombus on echo. Transferred to [facility] for further management. Pt C/o abdominal pain on 12/10 CTA showing mid SMA with embolus. Abnormal distal small bowel loops and cecum with dilatation and pneumatosis suggesting infarcted bowel. One or two tiny foci of  intrahepatic portal vein pneumatosis. Segmental infarction upper pole left kidney. Now s/p Ex lap, SMA embolectomy, 80cm SBR, abthera vac left in discontinuity (12/11) and transferred to SICU intubated. S/p second look (12/13) and most recently s/p OR for 3rd look, end-to-end anastomosis of remaining bowel, loop ileostomy and abdomen closure (12/15). Remains in SICU now extubated with acute limb ischemia to LLE pending amputation (BKA vs. AKA depending on whether it is felt a BKA would heal successfully), now off pressors. No current signs of infection or wet gangrene.     Plan:   - Examined leg this am and changed dressing. Dry gangrene of toes and part of foot with edema from the foot to the knee with no induration, fluctuation, or any sign of infection at this time. There is no concern for wet gangrene at this time.   - Anterior and posterior claf   -Continue local wound care and will await for leg to further demarcate as well as patients clinical status to improve. Will likely need an amputation in the future.  - Rest of care per SICU  - Vascular surgery Team 3C will continue to follow. Please call [phone] with any questions or concerns.   S: Discussed amputation with patient. He is agreeable to proceed with surgery.     O:     ICU Vital Signs Last 24 Hrs  T(C): 37.1 (26 Dec 2022 09:00), Max: 37.6 (25 Dec 2022 12:00)  T(F): 98.7 (26 Dec 2022 09:00), Max: 99.7 (25 Dec 2022 12:00)  HR: 115 (26 Dec 2022 09:00) (101 - 129)  BP: --  BP(mean): --  ABP: 114/110 (26 Dec 2022 09:00) (109/66 - 140/67)  ABP(mean): 112 (26 Dec 2022 09:00) (74 - 112)  RR: 18 (26 Dec 2022 09:00) (11 - 27)  SpO2: 100% (26 Dec 2022 09:00) (92% - 100%)    O2 Parameters below as of 26 Dec 2022 08:00  Patient On (Oxygen Delivery Method): room air    Physical Exam:  General: NAD, resting comfortably in bed  Pulmonary: Nonlabored breathing, no respiratory distress  Cardiovascular: Rapid a flutter with rates in 130s. Regular.   Abd: soft, ileostomy pink with red rubber in place, ostomy bag with brown liquid output   Extremities: Toes of LLE w/ dry gangrene. Large bullae of anterior and posterior left calf have opened, dressed in kerlix with serous drainage from the desquamated patches. Diffuse mild edema of LLE. LLE cool. No signs of infection or wet gangrene. No L DP/PT signals                          8.5    8.56  )-----------( 337      ( 26 Dec 2022 05:30 )             26.2   12-26    136  |  103  |  7   ----------------------------<  105<H>  4.1   |  26  |  0.77    Ca    7.8<L>      26 Dec 2022 05:30  Phos  2.3     12-26  Mg     2.1     12-26    A/P: 76 y/o M with Significant PMHx of IVDU found unresponsive at his nursing home, resolved after Narcan in the field, and brought to [facility]. Found to have PE, atrial flutter, and large LV thrombus on echo. Transferred to [facility] for further management. Pt C/o abdominal pain on 12/10 CTA showing mid SMA with embolus. Abnormal distal small bowel loops and cecum with dilatation and pneumatosis suggesting infarcted bowel. One or two tiny foci of  intrahepatic portal vein pneumatosis. Segmental infarction upper pole left kidney. Now s/p Ex lap, SMA embolectomy, 80cm SBR, abthera vac left in discontinuity (12/11) and transferred to SICU intubated. S/p second look (12/13) and most recently s/p OR for 3rd look, end-to-end anastomosis of remaining bowel, loop ileostomy and abdomen closure (12/15). Remains in SICU now extubated with acute limb ischemia to LLE pending amputation. While the ischemia has not yet fully demarcated, it is clear that the posterior calf (which is needed to heal a BKA flap) is involved and therefore only surgical option available to the patient is an AKA.     Plan:   - Examined leg this am and changed dressing. Dry gangrene of toes and part of foot with edema from the foot to the knee with no induration, fluctuation, or any sign of infection at this time. There is no concern for wet gangrene at this time.   - Planning for AKA once patient's clinical status is improved. Appreciate electrophysiology/surgery input.   - Continue local wound care.  - Rest of care per SICU  - Vascular surgery Team 3C will continue to follow. Please call [phone] with any questions or concerns.

## 2022-12-26 NOTE — PROGRESS NOTE ADULT - ASSESSMENT
77M with PMHx of IVDU found unresponsive at his nursing home, resolved after Narcan in the field and brought to [facility]. Found to have PE, atrial flutter, and large LV thrombus on echo. Transferred to [facility] for further management. Pt c/o abdominal pain on 12/10 CTA showing mid SMA with embolus. Abnormal distal small bowel loops and cecum with dilatation and pneumatosis suggesting infarcted bowel. One or two tiny foci of intrahepatic portal vein pneumatosis. Segmental infarction upper pole left kidney. Now s/p Ex lap, SMA embolectomy, 80cm SBR, abthera vac left in discontinuity (12/11) and transferred to SICU intubated. S/p second look (12/13) and most recently s/p OR for 3rd look, end-to-end anastomosis of remaining bowel, loop ileostomy and abdomen closure (12/15). Remains in SICU, now extubated with still with limb ischemia to LLE pending amputation and AC held given BRBPR and hematoma.    Neuro: Pain: IV Tylenol ATC  Psych: Depressed mood. No longer agitated or suicidal. Dronabinol for appetite.  CV: Septic shock--resolved; AFlutter s/p Amio gtt and Digoxin: Metop 12.5 BID; TTE (12/7) CHF- Severe RV dysfxn and LVEF 15%, holding Spironolactone and Valsartan. LV thrombus w LLE limb ischemia- Heparin gtt held (goal PTT 60-90). ASA 81 held. Continue to hold full AC per SGY. Pt agreeable for AKA, but will touch base with Cardiology for preop.     Pulm: on room air  GI: CLD + ensures, appetite improving, D5LR at 60cc. PPI. Ileostomy high output--increased to imodium 2 BID.  : Voids- Infarction of upper pole of Left Kidney, Renal US negative on 12/12. Uriarte.   ID: Off ABX // Dry gangrene no ABX.  Ischemic bowel: Zosyn (12/11-12/21), LLE wounds Vanc (12-19-21).   Heme: Hep C positive; Active T&S; Hg goal > 8  Endo: mISS, Hypoglycemia: D5LR @60  PPx: SCD. HSQ started  Lines:PIVs D/c: Rt TLC (12/11-12/17), L radial lizette (12/11-12/20). Remove R Lizette.

## 2022-12-26 NOTE — PROGRESS NOTE ADULT - SUBJECTIVE AND OBJECTIVE BOX
INTERVAL/OVERNIGHT EVENTS: Uriarte removed and passing TOV. DLI with high output of 2L.     SUBJECTIVE: This AM is doing well without pain. Appetite is better today. No N/V or abd pain with clears. No BRBPR and ostomy is functional, nonbloody.    Neurologic Medications  acetaminophen     Tablet .. 650 milliGRAM(s) Oral every 6 hours PRN Temp greater or equal to 38C (100.4F), Mild Pain (1 - 3)  dronabinol 2.5 milliGRAM(s) Oral every 12 hours    Cardiovascular Medications  metoprolol tartrate 12.5 milliGRAM(s) Oral every 12 hours    Gastrointestinal Medications  loperamide 2 milliGRAM(s) Oral every 12 hours  pantoprazole  Injectable 40 milliGRAM(s) IV Push two times a day    Endocrine/Metabolic Medications  insulin lispro (ADMELOG) corrective regimen sliding scale   SubCutaneous every 6 hours    Topical/Other Medications  chlorhexidine 2% Cloths 1 Application(s) Topical <User Schedule>  silver sulfADIAZINE 1% Cream 1 Application(s) Topical daily    MEDICATIONS  (PRN):  acetaminophen     Tablet .. 650 milliGRAM(s) Oral every 6 hours PRN Temp greater or equal to 38C (100.4F), Mild Pain (1 - 3)  dextrose Oral Gel 15 Gram(s) Oral once PRN Blood Glucose LESS THAN 70 milliGRAM(s)/deciliter    I&O's Detail    25 Dec 2022 07:01  -  26 Dec 2022 07:00  --------------------------------------------------------  IN:    dextrose 10%: 40 mL    dextrose 5% + lactated ringers: 1320 mL    IV PiggyBack: 250 mL    Lactated Ringers Bolus: 500 mL    Oral Fluid: 218 mL  Total IN: 2328 mL    OUT:    Ileostomy (mL): 2225 mL    Indwelling Catheter - Urethral (mL): 430 mL    Voided (mL): 190 mL  Total OUT: 2845 mL    Total NET: -517 mL    26 Dec 2022 07:01  -  26 Dec 2022 10:52  --------------------------------------------------------  IN:    dextrose 5% + lactated ringers: 120 mL    IV PiggyBack: 125 mL    Oral Fluid: 118 mL  Total IN: 363 mL    OUT:    Voided (mL): 150 mL  Total OUT: 150 mL    Total NET: 213 mL    Vital Signs Last 24 Hrs  T(C): 37.1 (26 Dec 2022 09:00), Max: 37.6 (25 Dec 2022 12:00)  T(F): 98.7 (26 Dec 2022 09:00), Max: 99.7 (25 Dec 2022 12:00)  HR: 130 (26 Dec 2022 10:00) (101 - 130)  RR: 18 (26 Dec 2022 10:00) (11 - 20)  SpO2: 100% (26 Dec 2022 10:00) (92% - 100%)    Parameters below as of 26 Dec 2022 10:00  Patient On (Oxygen Delivery Method): room air    General: Cachetic elderly male, appears stated age, responsive to questions, flat affect with improved mood  Neuro: Grossly intact bilaterally   HEENT: Normocephalic, atraumatic, trachea midline, no JVD   Heart: Regular S1/S2, atrial flutter   Lungs: Unlabored breathing on room air; Clear to auscultation bilaterally, no adventitious sounds   Abdomen: Soft, non-distended, normoactive bowel sounds throughout, no tenderness to palpation in all 4 quadrants; RLQ ostomy pink with thin brown liquid, midline laparotomy incision is clean/dry/intact.    : Urine appears concentrated  Upper Extremities: Edema in hands, freely mobile bilaterally   Lower Extremities: Dependent edema, SCDs in place on RLE; RLE warm, LLE cool with ruptured blisters from upper calf to toes   Neuro: Severe sensory and motor deficits in LLE. Absent sensation in left toes/foot  Skin: Warm except for LLE, non-diaphoretic throughout     LABS:                        8.5    8.56  )-----------( 337      ( 26 Dec 2022 05:30 )             26.2     12-26    136  |  103  |  7   ----------------------------<  105<H>  4.1   |  26  |  0.77    Ca    7.8<L>      26 Dec 2022 05:30  Phos  2.3     12-26  Mg     2.1     12-26    PT/INR - ( 26 Dec 2022 05:30 )   PT: 14.9 sec;   INR: 1.25        PTT - ( 26 Dec 2022 05:30 )  PTT:27.6 sec

## 2022-12-26 NOTE — PROGRESS NOTE ADULT - ASSESSMENT
78yo M with no significant PMH/PSx admitted to cardiology service on 12/7 in the setting of severely reduced LV function 2/2 an LV thrombus. Pt now with several days of post-prandial abdominal pain and loose BMs, initially suspected to be 2/2 opioid withdrawal, however on evening of 12/10 had a large bloody BM. 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 Vascular surgery (already following) with plan for OR. General surgery consulted for possible operative assistance in the setting of bowel ischemia. Now POD2 s/p ex lap, SMA embolectomy and small bowel resection. Transferred to CCU on 12/21/22 for cardiac optimization and now transferred back to SICU 12/22/22 for bleeding hemodynamic instability. CTA performed demonstrating large hematoma in R abdomen, new hemoperitoneum, and bilateral pleural effusions.     Plan:  Trend CBC, lactate. monitor HR  Restart SQH  Appreciate vascular surgery recommendations regarding possible amputation  Continue CLD and IVF  Rest of care per SICU

## 2022-12-27 NOTE — PROGRESS NOTE ADULT - SUBJECTIVE AND OBJECTIVE BOX
Subjective:    no acute voernight events.   tentative plan for AKA this week  off pressors over the weekend  recevied 1L LR this morning  no further bleeding.   was started on lopressor over the weekend. HR 120s-130s (AFl)    no dyspnea or orthopnea, pain controlled.     Medications:  acetaminophen     Tablet .. 650 milliGRAM(s) Oral every 6 hours PRN  albumin human  5% IVPB 250 milliLiter(s) IV Intermittent once  chlorhexidine 2% Cloths 1 Application(s) Topical <User Schedule>  dextrose 5%. 1000 milliLiter(s) IV Continuous <Continuous>  dextrose 5%. 1000 milliLiter(s) IV Continuous <Continuous>  dextrose Oral Gel 15 Gram(s) Oral once PRN  digoxin  IVPB 250 MICROGram(s) IV Intermittent every 6 hours  dronabinol 2.5 milliGRAM(s) Oral every 12 hours  glucagon  Injectable 1 milliGRAM(s) IntraMuscular once  heparin   Injectable 5000 Unit(s) SubCutaneous every 8 hours  influenza  Vaccine (HIGH DOSE) 0.7 milliLiter(s) IntraMuscular once  insulin lispro (ADMELOG) corrective regimen sliding scale   SubCutaneous every 6 hours  loperamide 2 milliGRAM(s) Oral every 12 hours  metoprolol tartrate 12.5 milliGRAM(s) Oral every 12 hours  pantoprazole  Injectable 40 milliGRAM(s) IV Push two times a day  psyllium Powder 1 Packet(s) Oral every 12 hours  silver sulfADIAZINE 1% Cream 1 Application(s) Topical daily      Physical Exam:    Vitals:  Vital Signs Last 24 Hours  T(C): 36.4 (12-27-22 @ 14:22), Max: 37.1 (12-27-22 @ 01:16)  HR: 117 (12-27-22 @ 15:00) (117 - 134)  BP: 116/69 (12-27-22 @ 15:00) (92/60 - 129/87)  RR: 14 (12-27-22 @ 15:00) (10 - 21)  SpO2: 100% (12-27-22 @ 15:00) (96% - 100%)        I&O's Summary    26 Dec 2022 07:01  -  27 Dec 2022 07:00  --------------------------------------------------------  IN: 4754 mL / OUT: 3355 mL / NET: 1399 mL    27 Dec 2022 07:01  -  27 Dec 2022 16:59  --------------------------------------------------------  IN: 1453.8 mL / OUT: 900 mL / NET: 553.8 mL        Tele:    chronically ill appearing, NAD  Neck supple no JVD, CVP 1  Heart RRR s1s2 no m   Lungs cTAB  Abd soft, colostomy with brown stool  Ext luke warm on right, LLE cool and necrotic, trace edema on RLE    Labs:                        8.1    7.34  )-----------( 296      ( 27 Dec 2022 14:14 )             24.8     12-27    132<L>  |  102  |  7   ----------------------------<  100<H>  4.0   |  25  |  0.75    Ca    7.7<L>      27 Dec 2022 05:14  Phos  2.1     12-27  Mg     1.9     12-27      PT/INR - ( 27 Dec 2022 05:14 )   PT: 15.3 sec;   INR: 1.28          PTT - ( 27 Dec 2022 05:14 )  PTT:28.7 sec  CARDIAC MARKERS ( 27 Dec 2022 05:14 )  x     / x     / 511 U/L / x     / 2.7 ng/mL      Creatine Kinase, Serum: 511 U/L (12-27-22 @ 05:14)  Creatine Kinase, Serum: 511 U/L (12-27-22 @ 05:14)

## 2022-12-27 NOTE — PROGRESS NOTE ADULT - ASSESSMENT
76 y/o M with no significant PMHx found unresponsive at his nursing home, resolved after narcan in the field, and brought to [facility]. Found to have LV dysfunction (Tachy mediated), sub-segmental PE, atrial flutter, and large LV thrombus on echo. Transferred to [facility] for further management. Course complicated by acute mesenteric ischemia now s/p embolectomy and bowel resection, septic shock, LLE arterial occlusion.  he was recovering well, tolerating the introduction of GDMT and plan was for cardioversion, however, he had another episode of hira hematochezia requiring transfusions and pressors. He has since been weaned off pressors and bleeding has stopped. he tolerated the addition of BB for rate control, however rates remain rapid 120-130s. He is now planned for an AKA of his LLE this week.     #HFrEF   EF 10-15%, LVIDD 5. Severely reduced RV function.   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 vs ischemic, ischemic eval when appropriate  GDMT: held i/s/o GIB, on lopressor currently, will transition to long acting BB, ARB/ARNI/MRA after surgery  Diuretics: CVP is low, hold off diuretics, would continue repleting his fluids, consider albumin infusion    #Atrial Flutter   - rates improved with increasing metoprolol, now rapid i/s/o GI bleed  - AC held for bleeding  - EP following, LORENZO/DCCV delayed, may assist with rate control  - would rate control with digoxin (250mcg q6h for 3 doses and 125mcg daily thereafter)    #Acute mesenteric ischemia and limb ischemia 2/2 LV thrombus emobolization   Now s/p Exlap Embolectomy of SMA and resection of 80cm of SB  - plan for AKA this week, he is overall an intermediate risk for MACE for this intermediate risk procedure, please perform under local nerve block if feasible to minimize risk of general anaesthesia   - care per surgery/vascular  78 y/o M with no significant PMHx found unresponsive at his nursing home, resolved after narcan in the field, and brought to [facility]. Found to have LV dysfunction (Tachy mediated), sub-segmental PE, atrial flutter, and large LV thrombus on echo. Transferred to [facility] for further management. Course complicated by acute mesenteric ischemia now s/p embolectomy and bowel resection, septic shock, LLE arterial occlusion.  he was recovering well, tolerating the introduction of GDMT and plan was for cardioversion, however, he had another episode of hira hematochezia requiring transfusions and pressors. He has since been weaned off pressors and bleeding has stopped. he tolerated the addition of BB for rate control, however rates remain rapid 120-130s. He is now planned for an AKA of his LLE this week.     #HFrEF   EF 10-15%, LVIDD 5. Severely reduced RV function.   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 ischemic, ischemic eval when appropriate  GDMT: held i/s/o GIB, on lopressor currently, will transition to long acting BB, ARB/ARNI/MRA after surgery  Diuretics: CVP is low, hold off diuretics, would continue repleting his fluids, consider albumin infusion    #Atrial Flutter   - rates improved with increasing metoprolol, now rapid i/s/o GI bleed  - AC held for bleeding  - EP following, LORENZO/DCCV delayed, may assist with rate control  - would rate control with digoxin (250mcg q6h for 3 doses and 125mcg daily thereafter)    #Acute mesenteric ischemia and limb ischemia 2/2 LV thrombus emobolization   Now s/p Exlap Embolectomy of SMA and resection of 80cm of SB  - plan for AKA this week, he is overall an intermediate risk for MACE for this intermediate risk procedure, please perform under local nerve block if feasible to minimize risk of general anaesthesia   - care per surgery/vascular

## 2022-12-27 NOTE — CHART NOTE - NSCHARTNOTEFT_GEN_A_CORE
Admitting Diagnosis:   Patient is a 77y old  Male who presents with a chief complaint of A flutter (27 Dec 2022 08:21)      PAST MEDICAL & SURGICAL HISTORY:      Current Nutrition Order:  Clear Liquids, Ensure clears x3/day     PO Intake: Good (%) [   ]  Fair (50-75%) [   ] Poor (<25%) [ X  ]  --Marinol is now ordered      GI Issues:   Ostomy: 800ml , 2000+ ml    Pt Denies NVDC or ABD Pain this AM during visit   Imodium and protonix are ordered     Pain:  No Pain per flow sheets  Pain meds ordered      Skin Integrity:  Buddy 13  +2 Edema (left hand; right foot; right leg), +3 Edema (left foot; left ankle; left leg); varying during admit   No pressure ulcers (+skin Tears noted)    Labs:       132<L>  |  102  |  7   ----------------------------<  100<H>  4.0   |  25  |  0.75    Ca    7.7<L>      27 Dec 2022 05:14  Phos  2.1       Mg     1.9           CAPILLARY BLOOD GLUCOSE      POCT Blood Glucose.: 101 mg/dL (27 Dec 2022 11:37)  POCT Blood Glucose.: 79 mg/dL (27 Dec 2022 05:29)  POCT Blood Glucose.: 98 mg/dL (26 Dec 2022 23:47)  POCT Blood Glucose.: 107 mg/dL (26 Dec 2022 16:06)      Medications:  MEDICATIONS  (STANDING):  chlorhexidine 2% Cloths 1 Application(s) Topical <User Schedule>  dextrose 5% + lactated ringers. 1000 milliLiter(s) (60 mL/Hr) IV Continuous <Continuous>  dextrose 5%. 1000 milliLiter(s) (100 mL/Hr) IV Continuous <Continuous>  dextrose 5%. 1000 milliLiter(s) (50 mL/Hr) IV Continuous <Continuous>  dronabinol 2.5 milliGRAM(s) Oral every 12 hours  glucagon  Injectable 1 milliGRAM(s) IntraMuscular once  heparin   Injectable 5000 Unit(s) SubCutaneous every 8 hours  influenza  Vaccine (HIGH DOSE) 0.7 milliLiter(s) IntraMuscular once  insulin lispro (ADMELOG) corrective regimen sliding scale   SubCutaneous every 6 hours  loperamide 2 milliGRAM(s) Oral every 12 hours  metoprolol tartrate 12.5 milliGRAM(s) Oral every 12 hours  pantoprazole  Injectable 40 milliGRAM(s) IV Push two times a day  silver sulfADIAZINE 1% Cream 1 Application(s) Topical daily    MEDICATIONS  (PRN):  acetaminophen     Tablet .. 650 milliGRAM(s) Oral every 6 hours PRN Temp greater or equal to 38C (100.4F), Mild Pain (1 - 3)  dextrose Oral Gel 15 Gram(s) Oral once PRN Blood Glucose LESS THAN 70 milliGRAM(s)/deciliter        6'6''  pounds +-10%  Wt 142 pounds BMI 16.4 %IBW=66%     Weight Change:    141.9 pounds - dosing wt    136 pounds - Bedscale wt     **PCM:  >> Reports having 1-2 meals/day + ONS. Per pt claims to have 2 ensure/day and 2 nutrament/day - unclear how accurate this is. ?If pt meeting ~75% EER consistently.  >> Does report wt loss.  pounds x6 months ago. Thinks he now is 135 pounds which is consistent with bedscale wt obtained during initial visit by  pounds. Suggest wt loss of 49 pounds/26% body wt loss.  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     Estimated energy needs:  Current body wt for EER based on clinician judgment   Adjust for age, malnutrition, EF, S/p OR; fluids per team  25-30kcal/k-1950kcal/day   1.2-1.4gm/k-91gm prot/day   **Rec upper end of needs     Subjective:  77M with PMHx of IVDU found unresponsive at his nursing home, resolved after Narcan in the field and brought to [facility]. Found to have PE, atrial flutter, and large LV thrombus on echo. Transferred to [facility] for further management. Pt c/o abdominal pain on 12/10 CTA showing mid SMA with embolus. Abnormal distal small bowel loops and cecum with dilatation and pneumatosis suggesting infarcted bowel. One or two tiny foci of intrahepatic portal vein pneumatosis. Segmental infarction upper pole left kidney. Now s/p Ex lap, SMA embolectomy, 80cm SBR, abthera vac left in discontinuity () and transferred to SICU intubated. S/p second look () and most recently s/p OR for 3rd look, end-to-end anastomosis of remaining bowel, loop ileostomy and abdomen closure (12/15). Transferred to CCU on  for cardiac optimization and now transferred back to SICU  for bleeding hemodynamic instability. CTA performed demonstrating large hematoma in R abdomen, new hemoperitoneum, and bilateral pleural effusions. Remains in SICU, now extubated with still with limb ischemia to LLE pending amputation and AC held given BRBPR and hematoma; noted pt agreeable for AKA when feasible.     Pt seen on [facility], Under SICU team-spoke with RN/pt. Pt now ordered for Clear liquids, reported he consumed 1 ensure clear this AM. Pt reports not liking clear liquids and wanting to eat solid foods. Despite limited intake reports tolerating without issues. Denies NVDC or ABD Pain this AM during visit.   Please see below for nutritions recommendations.     Prior PES: Malnutrition Severe in Chronic state RT presumed intake<EER AEB NFPE, wt loss, PO intake  >>on going  Goal: Pt will meet at least 75% of nutrient needs via feasible route / Show no further s/s of malnutrition    Recommendations:  1. As medically feasible and tolerated, cont to adv diet: clears -> full liquids -> soft low fiber diet, ensure MAX x3/day 150kcal/30gm prot per 1 can.   2. Monitor Diet tolerance, %PO intake. As feasible, Cont with appetite stimulant.    3. Would Strongly consider use of alternate means of nutrition given prolonged inadequate intake/diet during amdit in pt who is malnourished and now Pending further OR interventions.     4. Monitor Skin, Wts daily, GOC. Pain/GI per team.   5. Labs: monitor BMP, CBC, glucose, lytes, trend renal indices, LFts, POCT.  6. RD to remain available for additional Recs.    **D/w team.     Education: Spoke about usual diet progression. Understanding stated, provide additional motivation as needed.     Risk Level: High [X   ] Moderate [   ] Low [   ].

## 2022-12-27 NOTE — PROGRESS NOTE ADULT - SUBJECTIVE AND OBJECTIVE BOX
STATUS POST:      POST OPERATIVE DAY #:     SUBJECTIVE: Pt seen and examined at bedside this am by surgery team. Tolerating diet, pain well controlled. Denies f/n/v/cp/sob.    MEDICATIONS  (STANDING):  chlorhexidine 2% Cloths 1 Application(s) Topical <User Schedule>  dextrose 5% + lactated ringers. 1000 milliLiter(s) (60 mL/Hr) IV Continuous <Continuous>  dextrose 5%. 1000 milliLiter(s) (100 mL/Hr) IV Continuous <Continuous>  dextrose 5%. 1000 milliLiter(s) (50 mL/Hr) IV Continuous <Continuous>  dronabinol 2.5 milliGRAM(s) Oral every 12 hours  glucagon  Injectable 1 milliGRAM(s) IntraMuscular once  heparin   Injectable 5000 Unit(s) SubCutaneous every 8 hours  influenza  Vaccine (HIGH DOSE) 0.7 milliLiter(s) IntraMuscular once  insulin lispro (ADMELOG) corrective regimen sliding scale   SubCutaneous every 6 hours  loperamide 2 milliGRAM(s) Oral every 12 hours  metoprolol tartrate 12.5 milliGRAM(s) Oral every 12 hours  pantoprazole  Injectable 40 milliGRAM(s) IV Push two times a day  silver sulfADIAZINE 1% Cream 1 Application(s) Topical daily  sodium phosphate 30 milliMole(s)/500 mL IVPB 30 milliMole(s) IV Intermittent once    MEDICATIONS  (PRN):  acetaminophen     Tablet .. 650 milliGRAM(s) Oral every 6 hours PRN Temp greater or equal to 38C (100.4F), Mild Pain (1 - 3)  dextrose Oral Gel 15 Gram(s) Oral once PRN Blood Glucose LESS THAN 70 milliGRAM(s)/deciliter      Vital Signs Last 24 Hrs  T(C): 36.9 (27 Dec 2022 05:36), Max: 37.1 (26 Dec 2022 09:00)  T(F): 98.5 (27 Dec 2022 05:36), Max: 98.8 (27 Dec 2022 01:16)  HR: 124 (27 Dec 2022 08:00) (115 - 134)  BP: 115/84 (27 Dec 2022 08:00) (92/60 - 129/87)  BP(mean): 93 (27 Dec 2022 08:00) (72 - 95)  RR: 12 (27 Dec 2022 08:00) (10 - 23)  SpO2: 99% (27 Dec 2022 08:00) (96% - 100%)    Parameters below as of 27 Dec 2022 08:00  Patient On (Oxygen Delivery Method): room air        Physical Exam  General: NAD, resting comfortably in bed  Pulmonary: Nonlabored breathing, no respiratory distress  Cardiovascular: tachycardic, regular.   Abd: soft, ileostomy pink with red rubber in place, ostomy bag with brown liquid output, midline incision with staples  Extremities: Toes of LLE w/ dry gangrene. Large bullae of anterior and posterior left calf have opened, dressed in kerlix with serous drainage from the desquamated patches. Diffuse mild edema of LLE. LLE cool. No signs of infection or wet gangrene. No L DP/PT signals        I&O's Detail    26 Dec 2022 07:01  -  27 Dec 2022 07:00  --------------------------------------------------------  IN:    dextrose 5% + lactated ringers: 1440 mL    IV PiggyBack: 250 mL    Lactated Ringers Bolus: 500 mL    Lactated Ringers Bolus: 1500 mL    Oral Fluid: 1064 mL  Total IN: 4754 mL    OUT:    Ileostomy (mL): 2630 mL    Voided (mL): 725 mL  Total OUT: 3355 mL    Total NET: 1399 mL      27 Dec 2022 07:01  -  27 Dec 2022 08:22  --------------------------------------------------------  IN:    dextrose 5% + lactated ringers: 60 mL  Total IN: 60 mL    OUT:  Total OUT: 0 mL    Total NET: 60 mL          LABS:                        7.9    7.86  )-----------( 314      ( 27 Dec 2022 05:14 )             24.2     12-27    132<L>  |  102  |  7   ----------------------------<  100<H>  4.0   |  25  |  0.75    Ca    7.7<L>      27 Dec 2022 05:14  Phos  2.1     12-27  Mg     1.9     12-27      PT/INR - ( 27 Dec 2022 05:14 )   PT: 15.3 sec;   INR: 1.28          PTT - ( 27 Dec 2022 05:14 )  PTT:28.7 sec      RADIOLOGY & ADDITIONAL STUDIES: SUBJECTIVE: Pt seen and examined at bedside this am. Patient denies leg pain.     MEDICATIONS  (STANDING):  chlorhexidine 2% Cloths 1 Application(s) Topical <User Schedule>  dextrose 5% + lactated ringers. 1000 milliLiter(s) (60 mL/Hr) IV Continuous <Continuous>  dextrose 5%. 1000 milliLiter(s) (100 mL/Hr) IV Continuous <Continuous>  dextrose 5%. 1000 milliLiter(s) (50 mL/Hr) IV Continuous <Continuous>  dronabinol 2.5 milliGRAM(s) Oral every 12 hours  glucagon  Injectable 1 milliGRAM(s) IntraMuscular once  heparin   Injectable 5000 Unit(s) SubCutaneous every 8 hours  influenza  Vaccine (HIGH DOSE) 0.7 milliLiter(s) IntraMuscular once  insulin lispro (ADMELOG) corrective regimen sliding scale   SubCutaneous every 6 hours  loperamide 2 milliGRAM(s) Oral every 12 hours  metoprolol tartrate 12.5 milliGRAM(s) Oral every 12 hours  pantoprazole  Injectable 40 milliGRAM(s) IV Push two times a day  silver sulfADIAZINE 1% Cream 1 Application(s) Topical daily  sodium phosphate 30 milliMole(s)/500 mL IVPB 30 milliMole(s) IV Intermittent once    MEDICATIONS  (PRN):  acetaminophen     Tablet .. 650 milliGRAM(s) Oral every 6 hours PRN Temp greater or equal to 38C (100.4F), Mild Pain (1 - 3)  dextrose Oral Gel 15 Gram(s) Oral once PRN Blood Glucose LESS THAN 70 milliGRAM(s)/deciliter      Vital Signs Last 24 Hrs  T(C): 36.9 (27 Dec 2022 05:36), Max: 37.1 (26 Dec 2022 09:00)  T(F): 98.5 (27 Dec 2022 05:36), Max: 98.8 (27 Dec 2022 01:16)  HR: 124 (27 Dec 2022 08:00) (115 - 134)  BP: 115/84 (27 Dec 2022 08:00) (92/60 - 129/87)  BP(mean): 93 (27 Dec 2022 08:00) (72 - 95)  RR: 12 (27 Dec 2022 08:00) (10 - 23)  SpO2: 99% (27 Dec 2022 08:00) (96% - 100%)    Parameters below as of 27 Dec 2022 08:00  Patient On (Oxygen Delivery Method): room air        Physical Exam  General: NAD, resting comfortably in bed  Pulmonary: Nonlabored breathing, no respiratory distress  Cardiovascular: tachycardic, regular.   Abd: soft, ileostomy pink with red rubber in place, ostomy bag with brown liquid output, midline incision with staples  Extremities: Toes of LLE w/ dry gangrene. Large bullae of anterior and posterior left calf have opened, dressed in kerlix with serous drainage from the desquamated patches. Diffuse mild edema of LLE. LLE cool. No signs of infection or wet gangrene. No L DP/PT signals        I&O's Detail    26 Dec 2022 07:01  -  27 Dec 2022 07:00  --------------------------------------------------------  IN:    dextrose 5% + lactated ringers: 1440 mL    IV PiggyBack: 250 mL    Lactated Ringers Bolus: 500 mL    Lactated Ringers Bolus: 1500 mL    Oral Fluid: 1064 mL  Total IN: 4754 mL    OUT:    Ileostomy (mL): 2630 mL    Voided (mL): 725 mL  Total OUT: 3355 mL    Total NET: 1399 mL      27 Dec 2022 07:01  -  27 Dec 2022 08:22  --------------------------------------------------------  IN:    dextrose 5% + lactated ringers: 60 mL  Total IN: 60 mL    OUT:  Total OUT: 0 mL    Total NET: 60 mL          LABS:                        7.9    7.86  )-----------( 314      ( 27 Dec 2022 05:14 )             24.2     12-27    132<L>  |  102  |  7   ----------------------------<  100<H>  4.0   |  25  |  0.75    Ca    7.7<L>      27 Dec 2022 05:14  Phos  2.1     12-27  Mg     1.9     12-27      PT/INR - ( 27 Dec 2022 05:14 )   PT: 15.3 sec;   INR: 1.28          PTT - ( 27 Dec 2022 05:14 )  PTT:28.7 sec      RADIOLOGY & ADDITIONAL STUDIES:

## 2022-12-27 NOTE — PROGRESS NOTE ADULT - PROBLEM SELECTOR PLAN 5
.  CHF Severe RV dysfxn and LVEF 15%  - poor candidate for advance therapy interventions   - hospice eligibility pending hospital course   - appreciate cards recs, pending cards preop

## 2022-12-27 NOTE — PROGRESS NOTE ADULT - SUBJECTIVE AND OBJECTIVE BOX
ON:  LR 500cc bolus 2/2 low UOP and concentrated (refused straight cath)  12/26: DLI output 2 L, increased to imodium 2BID. Remove Lavelle, Agreeable for AKA--Cards no answer x2. Low UOP and concentrated. Gave LR 500cc and encouraged PO.     SUBJECTIVE: Patient seen and examined bedside; no acute events overnight, HD stable, sleepy this am, unable to fully participate in exam.    heparin   Injectable 5000 Unit(s) SubCutaneous every 8 hours  metoprolol tartrate 12.5 milliGRAM(s) Oral every 12 hours      Vital Signs Last 24 Hrs  T(C): 36.9 (27 Dec 2022 05:36), Max: 37.1 (26 Dec 2022 09:00)  T(F): 98.5 (27 Dec 2022 05:36), Max: 98.8 (27 Dec 2022 01:16)  HR: 131 (27 Dec 2022 06:00) (103 - 134)  BP: 112/68 (27 Dec 2022 06:00) (92/60 - 129/87)  BP(mean): 84 (27 Dec 2022 06:00) (72 - 95)  RR: 17 (27 Dec 2022 06:00) (10 - 23)  SpO2: 100% (27 Dec 2022 06:00) (96% - 100%)    Parameters below as of 27 Dec 2022 06:00  Patient On (Oxygen Delivery Method): room air      I&O's Detail    25 Dec 2022 07:01  -  26 Dec 2022 07:00  --------------------------------------------------------  IN:    dextrose 10%: 40 mL    dextrose 5% + lactated ringers: 1320 mL    IV PiggyBack: 250 mL    Lactated Ringers Bolus: 500 mL    Oral Fluid: 218 mL  Total IN: 2328 mL    OUT:    Ileostomy (mL): 2225 mL    Indwelling Catheter - Urethral (mL): 430 mL    Voided (mL): 190 mL  Total OUT: 2845 mL    Total NET: -517 mL      26 Dec 2022 07:01  -  27 Dec 2022 06:46  --------------------------------------------------------  IN:    dextrose 5% + lactated ringers: 1440 mL    IV PiggyBack: 250 mL    Lactated Ringers Bolus: 500 mL    Lactated Ringers Bolus: 500 mL    Oral Fluid: 1064 mL  Total IN: 3754 mL    OUT:    Ileostomy (mL): 2630 mL    Voided (mL): 725 mL  Total OUT: 3355 mL    Total NET: 399 mL      PE:    General: NAD, resting comfortably in bed  C/V: S1 s2, RRR  Pulm: Nonlabored breathing, no respiratory distress  Abd: Soft, NTND, ostomy with liquid brown stool in bad  Extrem: [facility]        LABS:                        7.9    7.86  )-----------( 314      ( 27 Dec 2022 05:14 )             24.2     12-27    132<L>  |  102  |  7   ----------------------------<  100<H>  4.0   |  25  |  0.75    Ca    7.7<L>      27 Dec 2022 05:14  Phos  2.1     12-27  Mg     1.9     12-27      PT/INR - ( 27 Dec 2022 05:14 )   PT: 15.3 sec;   INR: 1.28          PTT - ( 27 Dec 2022 05:14 )  PTT:28.7 sec      RADIOLOGY & ADDITIONAL STUDIES:

## 2022-12-27 NOTE — PROGRESS NOTE ADULT - ASSESSMENT
78 y/o M with Significant PMHx of IVDU found unresponsive at his nursing home, resolved after Narcan in the field, and brought to [facility]. Found to have PE, atrial flutter, and large LV thrombus on echo. Transferred to [facility] for further management. Pt C/o abdominal pain on 12/10 CTA showing mid SMA with embolus. Abnormal distal small bowel loops and cecum with dilatation and pneumatosis suggesting infarcted bowel. One or two tiny foci of  intrahepatic portal vein pneumatosis. Segmental infarction upper pole left kidney. Now s/p Ex lap, SMA embolectomy, 80cm SBR, abthera vac left in discontinuity (12/11) and transferred to SICU intubated. S/p second look (12/13) and most recently s/p OR for 3rd look, end-to-end anastomosis of remaining bowel, loop ileostomy and abdomen closure (12/15). Remains in SICU now extubated with limb ischemia to LLE pending amputation. While the ischemia has not yet fully demarcated, it is clear that the posterior calf (which is needed to heal a BKA flap) is involved and therefore only surgical option available to the patient is an AKA.     Plan:   - Examined leg this am. Dry gangrene of toes and part of foot with edema from the foot to the knee with no induration, fluctuation, or any sign of infection at this time. There is no concern for wet gangrene at this time.   - Restart hep gtt when able  - Planning for AKA once patient's clinical status is improved. Appreciate electrophysiology/surgery input.   - Continue local wound care.  - Rest of care per SICU  - Vascular surgery Team 3C will continue to follow. Please call [phone] with any questions or concerns.

## 2022-12-27 NOTE — PROGRESS NOTE ADULT - PROBLEM SELECTOR PLAN 3
.  at OSH, Found to have PE, atrial flutter, large LV thrombus. transferred to [facility] 12/7, course complicated by shock (resolved), LLE acute limb ischemia and SMA thrombosis, now s/p SBR w/ loop ileostomy  - pending AKA weds/thurs, ac heparin gtt held given melena, hematoma

## 2022-12-27 NOTE — PROGRESS NOTE ADULT - PROBLEM SELECTOR PLAN 7
.  Complex medical decision making / symptom management in the setting of advanced illness.    Active Psychosocial Referrals:   SW/CM[x  ] PT/OT[  ] Hospice[  ]  Ethics[  ]  Morelia Vidal Patient/Family Support[  ] Chaplaincy[   ]  Massage Therapy[ x ] Music Therapy [  ] Holistic RN[  ]  Coping:  well[  ] with difficulty[ x ] poor coping[  ] unable to assess[  ]   Support system: strong[ x ] adequate[  ] inadequate[  ]    - For new or uncontrolled symptoms, please call Palliative Care at [phone]. The service is available 24/7 (including nights & weekends) to provide symptom management recommendations over the phone as appropriate  - Will continue to follow for ongoing GOC discussion / titration of palliative regimen. Emotional support provided, questions answered.

## 2022-12-27 NOTE — CHART NOTE - NSCHARTNOTEFT_GEN_A_CORE
C/L psychology intern met with pt for individual psychotherapy in the context of adjustment disorder following hospitalization and leading up to leg amputation. Upon approach, pt was resting in bed, cooperative, though not particularly talkative. Pt's reported mood was "alright," chief complaint:  "they're gonna cut my leg off" and expressed that his hospitalization has "come out of nowhere." He reported trouble sleeping due to feelings of soreness on his backside. He reported poor appetite. In terms of social support, he reported that he speaks with his sister and lives with his son. His understanding is that he is due to have his leg amputated "in the next couple of days." Pt agreed to have psych continue to come and check in on him but denied feeling any need for psych at present time. Session ended with pt in stable mood and good behavioral control.

## 2022-12-27 NOTE — PROGRESS NOTE ADULT - PROBLEM SELECTOR PLAN 2
.  remains bedbound, pps 30%, able to feed self but requires assistance with ADLs  - cw supportive care, reposition q2, skin care   - PT when appropriate  - pending post op course, anticipate dispo back to NH

## 2022-12-27 NOTE — PROGRESS NOTE ADULT - ASSESSMENT
76yo M with no significant PMH/PSx admitted to cardiology service on 12/7 in the setting of severely reduced LV function 2/2 an LV thrombus. Pt now with several days of post-prandial abdominal pain and loose BMs, initially suspected to be 2/2 opioid withdrawal, however on evening of 12/10 had a large bloody BM. 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 Vascular surgery (already following) with plan for OR. General surgery consulted for possible operative assistance in the setting of bowel ischemia. Now POD2 s/p ex lap, SMA embolectomy and small bowel resection. Transferred to CCU on 12/21/22 for cardiac optimization and now transferred back to SICU 12/22/22 for bleeding hemodynamic instability. CTA performed demonstrating large hematoma in R abdomen, new hemoperitoneum, and bilateral pleural effusions.     Plan:    -Monitor ileostomy output  -Continue Imodium  -SQH  -Appreciate vascular surgery recs  -Continue CLD   -IVF  -Rest of care per SICU    -Surgery Team 4C will continue to follow. Please page Team 4 with questions/clinical changes. [phone]

## 2022-12-27 NOTE — PROGRESS NOTE ADULT - PROBLEM SELECTOR PLAN 6
.  - see goc note   In addition to the E/M visit, an advance care planning meeting was performed. Start time: 330; End time: 400; Total time: 30 min. A face to face meeting to discuss advance care planning was held today regarding: OLIVIA MAYERS. Primary decision maker:  Patient is able to participate in decision making;  Alternate/surrogate:  Sophie Mayers  . Discussed advance directives including, but not limited to, healthcare proxy and code status, as well as disease trajectory, patient's values/goals, and health care options that are available for end of life care. Decision regarding code status: DNR/DNI ; Documentation completed today: [facility] note

## 2022-12-27 NOTE — PROGRESS NOTE ADULT - ATTENDING COMMENTS
Briefly, 76 y/o M with h/o heroin use x 20 years found unresponsive at his nursing home, resolved after narcan in the field, and brought to [facility] [place]. Found to have LV dysfunction, sub-segmental PE, atrial flutter, and large LV thrombus on echo. Transferred to [facility] 12/7 for further management. Course complicated by acute mesenteric ischemia 12/11 now s/p embolectomy and bowel resection with ostomy, septic shock, LLE arterial occlusion. He has had a protracted course with overall failure to thrive and is now awaiting AKA of LLE. Endorsed frustration of his illness and wishes to be DNR/DNI. On exam, chronically ill, JVP low, RIJ TLC, irreg irreg rhythm, CTAB, ostomy w/ stool, no pedal edema, bandaged. Labs reviewed - Hb 7.9, BUN/Cr 7/1.2, albumin 2.0. TTE reviewed - EF 15-20%, LVEDD 4 cm (inferior wall akinesis, apex akinetic), LV stasis (thrombus not clearly visualized). Overall stage C HF, NYHA class IV who is hypovolemic but has multiple comorbidities which is impairing his QoL.  - would c/s pall care for GOC conversation; verify he's DNR/DNI  - hold diuretics; give albumin/PRBC for goal CVP 6-8  - c/w lopressor  - LV thrombus; hep on hold due to GIB  - afib; dig load as above  - discussed with patient disease process at length

## 2022-12-27 NOTE — PROGRESS NOTE ADULT - ASSESSMENT
77M with PMHx of IVDU found unresponsive at his nursing home, resolved after Narcan in the field and brought to [facility]. Found to have PE, atrial flutter, and large LV thrombus on echo. Transferred to [facility] for further management. Pt c/o abdominal pain on 12/10 CTA showing mid SMA with embolus. Abnormal distal small bowel loops and cecum with dilatation and pneumatosis suggesting infarcted bowel. One or two tiny foci of intrahepatic portal vein pneumatosis. Segmental infarction upper pole left kidney. Now s/p Ex lap, SMA embolectomy, 80cm SBR, abthera vac left in discontinuity (12/11) and transferred to SICU intubated. S/p second look (12/13) and most recently s/p OR for 3rd look, end-to-end anastomosis of remaining bowel, loop ileostomy and abdomen closure (12/15). Remains in SICU, now extubated with still with limb ischemia to LLE pending amputation and AC held given BRBPR and hematoma.    Neuro: Pain: IV Tylenol ATC  Psych: Depressed mood. No longer agitated or suicidal. Dronabinol for appetite.  CV: Septic shock--resolved; AFlutter s/p Amio gtt and Digoxin: Metop 12.5 BID; TTE (12/7) CHF- Severe RV dysfxn and LVEF 15%, holding Spironolactone and Valsartan. LV thrombus w LLE limb ischemia- Heparin gtt held (goal PTT 60-90). ASA 81 held. Continue to hold full AC per SGY. Pt agreeable for AKA, needs Cardiology for preop.     Pulm: on room air  GI: CLD + ensures, appetite improving, D5LR at 60cc. PPI. Ileostomy high output--increased to imodium 2 BID.  : Voids- Infarction of upper pole of Left Kidney, Renal US negative on 12/12. Uriarte.   ID: Off ABX // Dry gangrene no ABX.  Ischemic bowel: Zosyn (12/11-12/21), LLE wounds Vanc (12-19-21).   Heme: Hep C positive; Active T&S; Hg goal > 8  Endo: mISS, Hypoglycemia: D5LR @60  PPx: SCD. HSQ started  Lines: PIVs // DC: Rt TLC (12/11-12/17), L radial lizette (12/11-12/20). R Lizette (--12/26).

## 2022-12-27 NOTE — PROGRESS NOTE ADULT - CONVERSATION DETAILS
Met with pt for ongoing support. Discussed interval developments, pt continues to clinically improve and is planned for AKA tomorrow or Thursday pending demarcation of LLE. He continues to endorse depressed mood and struggles to accept this reality, but wishes to move forward and is slowly adjusting; he is appreciative of psychosocial support from staff and family during this complicated hospital course. We reviewed HF visit earlier today, where per SICU team, pt was deemed "intermediate risk for intermediate procedure."    Pt has been expressing to staff a desire not to be resuscitated in event of card arrest. Conversation revisited. We discussed risks/benefits/burdens and potential complications from cpr/intubation iso card/resp arrest and alternatively, allowing a natural death. Pt is clear in his wishes to allow natural death stating that when his "heart stops, it's [his] time" adding that heroic measures and "more tubes would only be prolonging the death of an already dead person." He is in agreement with rescinding DNR DNI for AKA, but wishes to reinstate following surgery. Discussed conversation with SICU team, DNR DNI MOLST completed and provided to SICU team. Met with pt for ongoing support. Discussed interval developments, pt continues to clinically improve and is planned for AKA tomorrow or Thursday pending demarcation of LLE. He continues to endorse depressed mood and struggles to accept this reality, but wishes to move forward and is slowly adjusting; he is appreciative of psychosocial support from staff and family during this complicated hospital course.     Pt has been expressing to staff a desire not to be resuscitated in event of card arrest. We discussed risks/benefits/burdens and potential complications from cpr/intubation and alternatively, allowing a natural death. Pt is clear in his wishes to allow natural death stating that when his "heart stops, it's [his] time" adding that heroic measures and "more tubes would only be prolonging the death of an already dead person." He is in agreement with rescinding DNR DNI for AKA, but wishes to reinstate following surgery. Discussed with SICU team, DNR DNI MOLST completed and provided to SICU team.

## 2022-12-27 NOTE — PROGRESS NOTE ADULT - ASSESSMENT
78 y/o M with no significant PMHx found unresponsive at NH, resolved after narcan, and brought to [facility]. Found to have LV dysfunction, PE, atrial flutter, large LV thrombus. Transferred to [facility] for further management, course complicated by acute mesenteric ischemia s/p embolectomy and bowel resection, septic shock, LLE arterial occlusion.  Pt w difficulty coping, Palliative Care consulted for psychosocial support and ACP.

## 2022-12-27 NOTE — PROGRESS NOTE ADULT - PROBLEM SELECTOR PLAN 1
.  cb severe protein vargas malnutrition. iso prolonged, complicated hospital course. improving, pt hungry, pending advancement of diet per primary team   - cw marinol 2.5mg PO q12

## 2022-12-27 NOTE — PROGRESS NOTE ADULT - SUBJECTIVE AND OBJECTIVE BOX
INTERVAL/OVERNIGHT EVENTS: LR 500cc bolus 2/2 low UOP and concentrated (refused straight cath)      SUBJECTIVE: Patient seen at bedside in no acute distress. Sleeping peacefully, depressed mood. No CP, SOB, fevers or chills.    Neurologic Medications  acetaminophen     Tablet .. 650 milliGRAM(s) Oral every 6 hours PRN Temp greater or equal to 38C (100.4F), Mild Pain (1 - 3)  dronabinol 2.5 milliGRAM(s) Oral every 12 hours    Respiratory Medications    Cardiovascular Medications  metoprolol tartrate 12.5 milliGRAM(s) Oral every 12 hours    Gastrointestinal Medications  dextrose 5% + lactated ringers. 1000 milliLiter(s) IV Continuous <Continuous>  dextrose 5%. 1000 milliLiter(s) IV Continuous <Continuous>  dextrose 5%. 1000 milliLiter(s) IV Continuous <Continuous>  loperamide 2 milliGRAM(s) Oral every 12 hours  pantoprazole  Injectable 40 milliGRAM(s) IV Push two times a day  sodium phosphate 30 milliMole(s)/500 mL IVPB 30 milliMole(s) IV Intermittent once    Genitourinary Medications    Hematologic/Oncologic Medications  heparin   Injectable 5000 Unit(s) SubCutaneous every 8 hours  influenza  Vaccine (HIGH DOSE) 0.7 milliLiter(s) IntraMuscular once    Antimicrobial/Immunologic Medications    Endocrine/Metabolic Medications  dextrose Oral Gel 15 Gram(s) Oral once PRN Blood Glucose LESS THAN 70 milliGRAM(s)/deciliter  glucagon  Injectable 1 milliGRAM(s) IntraMuscular once  insulin lispro (ADMELOG) corrective regimen sliding scale   SubCutaneous every 6 hours    Topical/Other Medications  chlorhexidine 2% Cloths 1 Application(s) Topical <User Schedule>  silver sulfADIAZINE 1% Cream 1 Application(s) Topical daily      MEDICATIONS  (PRN):  acetaminophen     Tablet .. 650 milliGRAM(s) Oral every 6 hours PRN Temp greater or equal to 38C (100.4F), Mild Pain (1 - 3)  dextrose Oral Gel 15 Gram(s) Oral once PRN Blood Glucose LESS THAN 70 milliGRAM(s)/deciliter      I&O's Detail    26 Dec 2022 07:01  -  27 Dec 2022 07:00  --------------------------------------------------------  IN:    dextrose 5% + lactated ringers: 1440 mL    IV PiggyBack: 250 mL    Lactated Ringers Bolus: 500 mL    Lactated Ringers Bolus: 1500 mL    Oral Fluid: 1064 mL  Total IN: 4754 mL    OUT:    Ileostomy (mL): 2630 mL    Voided (mL): 725 mL  Total OUT: 3355 mL    Total NET: 1399 mL      27 Dec 2022 07:01  -  27 Dec 2022 08:19  --------------------------------------------------------  IN:    dextrose 5% + lactated ringers: 60 mL  Total IN: 60 mL    OUT:  Total OUT: 0 mL    Total NET: 60 mL          Vital Signs Last 24 Hrs  T(C): 36.9 (27 Dec 2022 05:36), Max: 37.1 (26 Dec 2022 09:00)  T(F): 98.5 (27 Dec 2022 05:36), Max: 98.8 (27 Dec 2022 01:16)  HR: 124 (27 Dec 2022 08:00) (115 - 134)  BP: 115/84 (27 Dec 2022 08:00) (92/60 - 129/87)  BP(mean): 93 (27 Dec 2022 08:00) (72 - 95)  RR: 12 (27 Dec 2022 08:00) (10 - 23)  SpO2: 99% (27 Dec 2022 08:00) (96% - 100%)    Parameters below as of 27 Dec 2022 08:00  Patient On (Oxygen Delivery Method): room air        General: Cachetic elderly male, appears stated age, responsive to questions, flat affect with improved mood  Neuro: Grossly intact bilaterally   HEENT: Normocephalic, atraumatic, trachea midline, no JVD   Heart: Regular S1/S2, atrial flutter   Lungs: Unlabored breathing on room air; Clear to auscultation bilaterally, no adventitious sounds   Abdomen: Soft, non-distended, normoactive bowel sounds throughout, no tenderness to palpation in all 4 quadrants; RLQ ostomy pink with thin brown liquid, midline laparotomy incision is clean/dry/intact.    : Urine appears concentrated  Upper Extremities: Edema in hands, freely mobile bilaterally   Lower Extremities: Dependent edema, SCDs in place on RLE; RLE warm, LLE cool with ruptured blisters from upper calf to toes, dry gangrene of L toes   Neuro: Severe sensory and motor deficits in LLE. Absent sensation in left toes/foot  Skin: Warm except for LLE, non-diaphoretic throughout    LABS:                        7.9    7.86  )-----------( 314      ( 27 Dec 2022 05:14 )             24.2     12-27    132<L>  |  102  |  7   ----------------------------<  100<H>  4.0   |  25  |  0.75    Ca    7.7<L>      27 Dec 2022 05:14  Phos  2.1     12-27  Mg     1.9     12-27      PT/INR - ( 27 Dec 2022 05:14 )   PT: 15.3 sec;   INR: 1.28          PTT - ( 27 Dec 2022 05:14 )  PTT:28.7 sec      RADIOLOGY & ADDITIONAL STUDIES:

## 2022-12-27 NOTE — PROGRESS NOTE ADULT - SUBJECTIVE AND OBJECTIVE BOX
SUBJECTIVE: Interval events noted. Pt pending AKA tomorrow/Thursday. today feeling "okay," is appreciative of psych support. c/o back pain from being bedbound that typically resolves with frequent repositioning. abdominal pain resolved. hungry and eager to eat. comprehensive ros as below, collateral obtained from Team.  goc exploration as noted below.     ALLERGIES: No Known Allergies      MEDICATIONS: REVIEWED  MEDICATIONS  (STANDING):  albumin human  5% IVPB 250 milliLiter(s) IV Intermittent once  chlorhexidine 2% Cloths 1 Application(s) Topical <User Schedule>  dextrose 5%. 1000 milliLiter(s) (100 mL/Hr) IV Continuous <Continuous>  dextrose 5%. 1000 milliLiter(s) (50 mL/Hr) IV Continuous <Continuous>  dronabinol 2.5 milliGRAM(s) Oral every 12 hours  glucagon  Injectable 1 milliGRAM(s) IntraMuscular once  heparin   Injectable 5000 Unit(s) SubCutaneous every 8 hours  influenza  Vaccine (HIGH DOSE) 0.7 milliLiter(s) IntraMuscular once  insulin lispro (ADMELOG) corrective regimen sliding scale   SubCutaneous every 6 hours  loperamide 2 milliGRAM(s) Oral every 12 hours  metoprolol tartrate 12.5 milliGRAM(s) Oral every 12 hours  pantoprazole  Injectable 40 milliGRAM(s) IV Push two times a day  psyllium Powder 1 Packet(s) Oral every 12 hours  silver sulfADIAZINE 1% Cream 1 Application(s) Topical daily    MEDICATIONS  (PRN):  acetaminophen     Tablet .. 650 milliGRAM(s) Oral every 6 hours PRN Temp greater or equal to 38C (100.4F), Mild Pain (1 - 3)  dextrose Oral Gel 15 Gram(s) Oral once PRN Blood Glucose LESS THAN 70 milliGRAM(s)/deciliter      Analgesic Use (Scheduled and PRNs) for past 24 hours (6a-6a):  - none     PRESENT SYMPTOMS:   [ ]Unable to obtain due to poor mentation   Source if other than patient:  [ ]Family   [ ]Team     Pain [  ] Resolved  Location :    abdomen     Quality: sharp, knife like, electric   Radiation: diffuse   Timing: intermittent with repositioning, deep breathing, coughing     PAIN AD Score:  n/a  [email or web address] (press ctrl +  left click to view)      If [  ], pt denies symptom.   Dyspnea:         [  ]  Anxiety:           [  ]  Difficulty sleeping: [x  ] dt discomfort to back   Fatigue:           [x  ]  Nausea:           [  ]  Loss of appetite:     [x  ]  Dysphagia: [  ]  Constipation:   [  ]        Grief Present   [x  ] Yes   [  ] No   Other Symptoms: low, depressed mood     All other review of systems negative [x  ]    ECOG Performance:     4  Current Palliative Performance Scale/Karnofsky Score:  30  %  Preadmit Karnofsky:  50 %          PEx:  General: frail elderly man lying in bed alert  oriented x   3 verbal  Behavioral: depressed mood, appropriate affect,  cooperative  HEENT: atraumatic, No abnormal lesions, No dry mouth, trace bl  temporal wasting  RESP: Reg rhythm,  No tachypnea/labored breathing,  CTAB, diminished bilat bases  CV: RRR, S1S2, No  tachycardia  GI: soft non distended +tender with palpation   :  bowen  MUSK: weakness x4,  No edema, fully  BB/WC bound  SKIN: No abnormal skin lesions, Poor skin turgor. Toes of LLE w/ dry gangrene. dressing to LLE d/c/i  NEURO:  No deficits, cognitive impairment, encephalopathic dsyphagia dysarthria paresis  Oral intake ability: full capability    T(C): 36.4 (12-27-22 @ 14:22), Max: 37.1 (12-27-22 @ 01:16)  HR: 117 (12-27-22 @ 15:00) (117 - 134)  BP: 116/69 (12-27-22 @ 15:00) (92/60 - 129/87)  RR: 14 (12-27-22 @ 15:00) (10 - 23)  SpO2: 100% (12-27-22 @ 15:00) (96% - 100%)  Wt(kg): --      LABS: REVIEWED  CBC:                        8.1    7.34  )-----------( 296      ( 27 Dec 2022 14:14 )             24.8     CMP:    12-27    132<L>  |  102  |  7   ----------------------------<  100<H>  4.0   |  25  |  0.75    Ca    7.7<L>      27 Dec 2022 05:14  Phos  2.1     12-27  Mg     1.9     12-27        RADIOLOGY & ADDITIONAL STUDIES:     DISCUSSION OF CASE:  - discussed POC     CARE COORDINATION:      SUBJECTIVE: Interval events noted. Pt pending AKA tomorrow/Thursday. today feeling "okay," is appreciative of psych support. c/o back pain from being bedbound that typically resolves with frequent repositioning. abdominal pain resolved. hungry and eager to eat. comprehensive ros as below, collateral obtained from Team.  goc exploration as noted below.     ALLERGIES: No Known Allergies      MEDICATIONS: REVIEWED  MEDICATIONS  (STANDING):  albumin human  5% IVPB 250 milliLiter(s) IV Intermittent once  chlorhexidine 2% Cloths 1 Application(s) Topical <User Schedule>  dextrose 5%. 1000 milliLiter(s) (100 mL/Hr) IV Continuous <Continuous>  dextrose 5%. 1000 milliLiter(s) (50 mL/Hr) IV Continuous <Continuous>  dronabinol 2.5 milliGRAM(s) Oral every 12 hours  glucagon  Injectable 1 milliGRAM(s) IntraMuscular once  heparin   Injectable 5000 Unit(s) SubCutaneous every 8 hours  influenza  Vaccine (HIGH DOSE) 0.7 milliLiter(s) IntraMuscular once  insulin lispro (ADMELOG) corrective regimen sliding scale   SubCutaneous every 6 hours  loperamide 2 milliGRAM(s) Oral every 12 hours  metoprolol tartrate 12.5 milliGRAM(s) Oral every 12 hours  pantoprazole  Injectable 40 milliGRAM(s) IV Push two times a day  psyllium Powder 1 Packet(s) Oral every 12 hours  silver sulfADIAZINE 1% Cream 1 Application(s) Topical daily    MEDICATIONS  (PRN):  acetaminophen     Tablet .. 650 milliGRAM(s) Oral every 6 hours PRN Temp greater or equal to 38C (100.4F), Mild Pain (1 - 3)  dextrose Oral Gel 15 Gram(s) Oral once PRN Blood Glucose LESS THAN 70 milliGRAM(s)/deciliter      Analgesic Use (Scheduled and PRNs) for past 24 hours (6a-6a):  - none     PRESENT SYMPTOMS:   [ ]Unable to obtain due to poor mentation   Source if other than patient:  [ ]Family   [ ]Team     Pain [  ] Resolved  Location :    abdomen     Quality: sharp, knife like, electric   Radiation: diffuse   Timing: intermittent with repositioning, deep breathing, coughing     PAIN AD Score:  n/a  [email or web address] (press ctrl +  left click to view)      If [  ], pt denies symptom.   Dyspnea:         [  ]  Anxiety:           [  ]  Difficulty sleeping: [x  ] dt discomfort to back   Fatigue:           [x  ]  Nausea:           [  ]  Loss of appetite:     [x  ]  Dysphagia: [  ]  Constipation:   [  ]        Grief Present   [x  ] Yes   [  ] No   Other Symptoms: low, depressed mood     All other review of systems negative [x  ]    ECOG Performance:     4  Current Palliative Performance Scale/Karnofsky Score:  30  %  Preadmit Karnofsky:  50 %          PEx:  General: thin elderly man lying in bed alert  oriented x   3 verbal  Behavioral: depressed mood, appropriate affect,  cooperative  HEENT: atraumatic, No abnormal lesions, No dry mouth, trace bl  temporal wasting  RESP: Reg rhythm,  No tachypnea/labored breathing,  CTAB, diminished bilat bases  CV: RRR, S1S2, No  tachycardia  GI: soft non distended +tender with palpation   :  bowen  MUSK: weakness x4,  No edema, fully  BB/WC bound  SKIN: No abnormal skin lesions, Poor skin turgor. Toes of LLE w/ dry gangrene. dressing to LLE d/c/i  NEURO:  No deficits, cognitive impairment, encephalopathic dsyphagia dysarthria paresis  Oral intake ability: full capability    T(C): 36.4 (12-27-22 @ 14:22), Max: 37.1 (12-27-22 @ 01:16)  HR: 117 (12-27-22 @ 15:00) (117 - 134)  BP: 116/69 (12-27-22 @ 15:00) (92/60 - 129/87)  RR: 14 (12-27-22 @ 15:00) (10 - 23)  SpO2: 100% (12-27-22 @ 15:00) (96% - 100%)  Wt(kg): --      LABS: REVIEWED  CBC:                        8.1    7.34  )-----------( 296      ( 27 Dec 2022 14:14 )             24.8     CMP:    12-27    132<L>  |  102  |  7   ----------------------------<  100<H>  4.0   |  25  |  0.75    Ca    7.7<L>      27 Dec 2022 05:14  Phos  2.1     12-27  Mg     1.9     12-27        RADIOLOGY & ADDITIONAL STUDIES:   < from: CT Angio Abdomen and Pelvis w/ IV Cont (12.22.22 @ 13:37) >    IMPRESSION:    1. Since December 11, 2022, no active contrast extravasation. Large right   hemiabdomen hematoma, involving hepatic flexure and proximal transverse   colon, possible mesocolon injury.  2. Increased ascites and bilateral pleural effusions. New hemoperitoneum.  3. Reconstitution of flow in superior mesenteric artery.    < from: Xray Chest 1 View-PORTABLE IMMEDIATE (Xray Chest 1 View-PORTABLE IMMEDIATE .) (12.22.22 @ 12:56) >    IMPRESSION:  New right IJV catheter the tip located at the level of the distal   superior vena cava/superior cavoatrial junction. No pneumothorax.        DISCUSSION OF CASE:  - discussed POC w surgery, SICU teams

## 2022-12-28 NOTE — PROGRESS NOTE ADULT - SUBJECTIVE AND OBJECTIVE BOX
INTERVAL/OVERNIGHT EVENTS:    SUBJECTIVE: Patient seen at bedside in no acute distress. Pain controlled.     Neurologic Medications  acetaminophen     Tablet .. 650 milliGRAM(s) Oral every 6 hours PRN Temp greater or equal to 38C (100.4F), Mild Pain (1 - 3)  dronabinol 2.5 milliGRAM(s) Oral every 12 hours    Respiratory Medications    Cardiovascular Medications  digoxin  Injectable 125 MICROGram(s) IV Push daily  metoprolol tartrate 12.5 milliGRAM(s) Oral every 12 hours    Gastrointestinal Medications  dextrose 5%. 1000 milliLiter(s) IV Continuous <Continuous>  dextrose 5%. 1000 milliLiter(s) IV Continuous <Continuous>  loperamide 2 milliGRAM(s) Oral every 12 hours  pantoprazole  Injectable 40 milliGRAM(s) IV Push two times a day  psyllium Powder 1 Packet(s) Oral every 12 hours    Genitourinary Medications    Hematologic/Oncologic Medications  heparin  Infusion 1100 Unit(s)/Hr IV Continuous <Continuous>  influenza  Vaccine (HIGH DOSE) 0.7 milliLiter(s) IntraMuscular once    Antimicrobial/Immunologic Medications    Endocrine/Metabolic Medications  dextrose Oral Gel 15 Gram(s) Oral once PRN Blood Glucose LESS THAN 70 milliGRAM(s)/deciliter  glucagon  Injectable 1 milliGRAM(s) IntraMuscular once  insulin lispro (ADMELOG) corrective regimen sliding scale   SubCutaneous every 6 hours    Topical/Other Medications  chlorhexidine 2% Cloths 1 Application(s) Topical <User Schedule>  silver sulfADIAZINE 1% Cream 1 Application(s) Topical daily      MEDICATIONS  (PRN):  acetaminophen     Tablet .. 650 milliGRAM(s) Oral every 6 hours PRN Temp greater or equal to 38C (100.4F), Mild Pain (1 - 3)  dextrose Oral Gel 15 Gram(s) Oral once PRN Blood Glucose LESS THAN 70 milliGRAM(s)/deciliter      I&O's Detail    27 Dec 2022 07:01  -  28 Dec 2022 07:00  --------------------------------------------------------  IN:    Albumin 5%  - 250 mL: 250 mL    dextrose 5% + lactated ringers: 480 mL    IV PiggyBack: 562.3 mL    IV PiggyBack: 150 mL    Oral Fluid: 1281 mL    PRBCs (Packed Red Blood Cells): 196 mL  Total IN: 2919.3 mL    OUT:    Ileostomy (mL): 3525 mL    Voided (mL): 710 mL  Total OUT: 4235 mL    Total NET: -1315.7 mL      28 Dec 2022 07:01  -  28 Dec 2022 11:45  --------------------------------------------------------  IN:    IV PiggyBack: 187.5 mL    Lactated Ringers Bolus: 1000 mL    Oral Fluid: 330 mL  Total IN: 1517.5 mL    OUT:  Total OUT: 0 mL    Total NET: 1517.5 mL          Vital Signs Last 24 Hrs  T(C): 37.1 (28 Dec 2022 10:41), Max: 37.1 (28 Dec 2022 10:41)  T(F): 98.7 (28 Dec 2022 10:41), Max: 98.7 (28 Dec 2022 10:41)  HR: 105 (28 Dec 2022 11:29) (87 - 136)  BP: 118/73 (28 Dec 2022 11:29) (96/62 - 153/66)  BP(mean): 91 (28 Dec 2022 11:29) (73 - 109)  RR: 17 (28 Dec 2022 11:29) (9 - 26)  SpO2: 97% (28 Dec 2022 11:29) (95% - 100%)    Parameters below as of 28 Dec 2022 11:29  Patient On (Oxygen Delivery Method): room air    General: Cachetic elderly male, appears stated age, responsive to questions, flat affect with improved mood  Neuro: Grossly intact bilaterally   HEENT: Normocephalic, atraumatic, trachea midline, no JVD   Heart: Regular S1/S2, atrial flutter   Lungs: Unlabored breathing on room air; Clear to auscultation bilaterally, no adventitious sounds   Abdomen: Soft, non-distended, normoactive bowel sounds throughout, no tenderness to palpation in all 4 quadrants; RLQ ostomy pink with thin brown liquid, midline laparotomy incision is clean/dry/intact.    : Urine appears concentrated  Upper Extremities: Edema in hands, freely mobile bilaterally   Lower Extremities: Dependent edema, SCDs in place on RLE; RLE warm, LLE cool with ruptured blisters from upper calf to toes, dry gangrene of L toes   Neuro: Severe sensory and motor deficits in LLE. Absent sensation in left toes/foot  Skin: Warm except for LLE, non-diaphoretic throughout    LABS:                        9.6    7.39  )-----------( 296      ( 28 Dec 2022 05:44 )             29.5     12-28    136  |  104  |  7   ----------------------------<  99  3.6   |  25  |  0.74    Ca    8.1<L>      28 Dec 2022 05:44  Phos  2.4     12-28  Mg     1.9     12-28    TPro  5.4<L>  /  Alb  2.4<L>  /  TBili  1.4<H>  /  DBili  x   /  AST  24  /  ALT  24  /  AlkPhos  98  12-28    PT/INR - ( 28 Dec 2022 05:44 )   PT: 14.9 sec;   INR: 1.25          PTT - ( 28 Dec 2022 05:44 )  PTT:30.9 sec      RADIOLOGY & ADDITIONAL STUDIES:

## 2022-12-28 NOTE — PROGRESS NOTE ADULT - PROBLEM SELECTOR PLAN 3
Now s/p Exlap Embolectomy of SMA and resection of 80cm of SB  - plan for AKA this week, he is overall an intermediate risk for MACE for this intermediate risk procedure, please perform under local nerve block if feasible to minimize risk of general anaesthesia   - care per surgery/vascular

## 2022-12-28 NOTE — PROGRESS NOTE ADULT - ASSESSMENT
78 y/o M with no significant PMHx found unresponsive at his nursing home, resolved after narcan in the field, and brought to [facility]. Found to have LV dysfunction (Tachy mediated), sub-segmental PE, atrial flutter, and large LV thrombus on echo. Transferred to [facility] for further management. Course complicated by acute mesenteric ischemia now s/p embolectomy and bowel resection, septic shock, LLE arterial occlusion.  he was recovering well, tolerating the introduction of GDMT and plan was for cardioversion, however, he had another episode of hira hematochezia requiring transfusions and pressors. He has since been weaned off pressors and bleeding has stopped. he tolerated the addition of BB for rate control, however rates remain rapid 120-130s. He is now planned for an AKA of his LLE this week.     #HFrEF   EF 10-15%, LVIDD 5. Severely reduced RV function.   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 Continue Digoxin 125mcg daily    #Acute mesenteric ischemia and limb ischemia 2/2 LV thrombus emobolization   Now s/p Exlap Embolectomy of SMA and resection of 80cm of SB  - plan for AKA this week, he is overall an intermediate risk for MACE for this intermediate risk procedure, please perform under local nerve block if feasible to minimize risk of general anaesthesia   - care per surgery/vascular  78 y/o M with no significant PMHx found unresponsive at his nursing home, resolved after narcan in the field, and brought to [facility]. Found to have LV dysfunction (Tachy mediated), sub-segmental PE, atrial flutter, and large LV thrombus on echo. Transferred to [facility] for further management. Course complicated by acute mesenteric ischemia now s/p embolectomy and bowel resection, septic shock, LLE arterial occlusion.  he was recovering well, tolerating the introduction of GDMT and plan was for cardioversion, however, he had another episode of hira hematochezia requiring transfusions and pressors. He has since been weaned off pressors and bleeding has stopped. he tolerated the addition of BB for rate control, however rates remain rapid 120-130s. He is now planned for an AKA of his LLE this week.

## 2022-12-28 NOTE — PROGRESS NOTE ADULT - SUBJECTIVE AND OBJECTIVE BOX
SUBJECTIVE: Pt seen and examined at bedside this am. No acute complains.     MEDICATIONS  (STANDING):  chlorhexidine 2% Cloths 1 Application(s) Topical <User Schedule>  dextrose 5%. 1000 milliLiter(s) (50 mL/Hr) IV Continuous <Continuous>  dextrose 5%. 1000 milliLiter(s) (100 mL/Hr) IV Continuous <Continuous>  digoxin  Injectable 125 MICROGram(s) IV Push daily  dronabinol 2.5 milliGRAM(s) Oral every 12 hours  glucagon  Injectable 1 milliGRAM(s) IntraMuscular once  heparin  Infusion 1100 Unit(s)/Hr (11 mL/Hr) IV Continuous <Continuous>  influenza  Vaccine (HIGH DOSE) 0.7 milliLiter(s) IntraMuscular once  insulin lispro (ADMELOG) corrective regimen sliding scale   SubCutaneous every 6 hours  loperamide 2 milliGRAM(s) Oral every 12 hours  metoprolol tartrate 12.5 milliGRAM(s) Oral every 12 hours  pantoprazole  Injectable 40 milliGRAM(s) IV Push two times a day  psyllium Powder 1 Packet(s) Oral every 12 hours  silver sulfADIAZINE 1% Cream 1 Application(s) Topical daily    MEDICATIONS  (PRN):  acetaminophen     Tablet .. 650 milliGRAM(s) Oral every 6 hours PRN Temp greater or equal to 38C (100.4F), Mild Pain (1 - 3)  dextrose Oral Gel 15 Gram(s) Oral once PRN Blood Glucose LESS THAN 70 milliGRAM(s)/deciliter      Vital Signs Last 24 Hrs  T(C): 37.1 (28 Dec 2022 10:41), Max: 37.1 (28 Dec 2022 10:41)  T(F): 98.7 (28 Dec 2022 10:41), Max: 98.7 (28 Dec 2022 10:41)  HR: 105 (28 Dec 2022 11:29) (87 - 136)  BP: 118/73 (28 Dec 2022 11:29) (96/62 - 153/66)  BP(mean): 91 (28 Dec 2022 11:29) (73 - 109)  RR: 17 (28 Dec 2022 11:29) (9 - 26)  SpO2: 97% (28 Dec 2022 11:29) (95% - 100%)    Parameters below as of 28 Dec 2022 11:29  Patient On (Oxygen Delivery Method): room air        Physical Exam  General: NAD, resting comfortably in bed  Pulmonary: Nonlabored breathing, no respiratory distress  Cardiovascular: tachycardic, regular.   Abd: soft, ileostomy pink, ostomy bag with brown liquid output, midline incision with staples  Extremities: Toes of LLE w/ dry gangrene. Large bullae of anterior and posterior left calf have opened, dressed in kerlix with serous drainage from the desquamated patches. Diffuse mild edema of LLE. LLE cool. No signs of infection or wet gangrene. No L DP/PT signals      I&O's Detail    27 Dec 2022 07:01  -  28 Dec 2022 07:00  --------------------------------------------------------  IN:    Albumin 5%  - 250 mL: 250 mL    dextrose 5% + lactated ringers: 480 mL    IV PiggyBack: 562.3 mL    IV PiggyBack: 150 mL    Oral Fluid: 1281 mL    PRBCs (Packed Red Blood Cells): 196 mL  Total IN: 2919.3 mL    OUT:    Ileostomy (mL): 3525 mL    Voided (mL): 710 mL  Total OUT: 4235 mL    Total NET: -1315.7 mL      28 Dec 2022 07:01  -  28 Dec 2022 11:43  --------------------------------------------------------  IN:    IV PiggyBack: 187.5 mL    Lactated Ringers Bolus: 1000 mL    Oral Fluid: 330 mL  Total IN: 1517.5 mL    OUT:  Total OUT: 0 mL    Total NET: 1517.5 mL          LABS:                        9.6    7.39  )-----------( 296      ( 28 Dec 2022 05:44 )             29.5     12-28    136  |  104  |  7   ----------------------------<  99  3.6   |  25  |  0.74    Ca    8.1<L>      28 Dec 2022 05:44  Phos  2.4     12-28  Mg     1.9     12-28    TPro  5.4<L>  /  Alb  2.4<L>  /  TBili  1.4<H>  /  DBili  x   /  AST  24  /  ALT  24  /  AlkPhos  98  12-28    PT/INR - ( 28 Dec 2022 05:44 )   PT: 14.9 sec;   INR: 1.25          PTT - ( 28 Dec 2022 05:44 )  PTT:30.9 sec      RADIOLOGY & ADDITIONAL STUDIES:

## 2022-12-28 NOTE — PROGRESS NOTE ADULT - PROBLEM SELECTOR PLAN 5
Hospital course complicated by LLE limb ischemia. Physical exam notable for areas of dry gangrene to the LLE, no signs of active infection.   -Patient amenable to AKA and plan for surgery sometime this week

## 2022-12-28 NOTE — PROGRESS NOTE ADULT - SUBJECTIVE AND OBJECTIVE BOX
ON: High Ost OP. Missed void x 1.   12/27: Lytes repleted, 1L  bolus LR for low UOP; CK/Myoglobin wnl; 2pm Hgb 8.1 (7.9). Dig load per cardiology, 1 unit PRBCs & 250 alb 5% x1 to optimize preop; ok per gen surg team to adv to reg/high fiber diet, added metamucil BID for high otput, dc'ed mIVF, Per HF : started digoxin per HF. TTE: severely reduced EF. LV thrombus from prior study not visible. Global hypokinesis. EF 10-15%.     SUBJECTIVE: Patient seen and examined bedside; no events overnight, HD stable, tolerating regular diet.    digoxin  Injectable 125 MICROGram(s) IV Push daily  heparin  Infusion 1100 Unit(s)/Hr IV Continuous <Continuous>  metoprolol tartrate 12.5 milliGRAM(s) Oral every 12 hours      Vital Signs Last 24 Hrs  T(C): 37.3 (28 Dec 2022 17:07), Max: 37.3 (28 Dec 2022 17:07)  T(F): 99.2 (28 Dec 2022 17:07), Max: 99.2 (28 Dec 2022 17:07)  HR: 113 (28 Dec 2022 17:00) (87 - 134)  BP: 139/74 (28 Dec 2022 17:00) (109/73 - 153/66)  BP(mean): 99 (28 Dec 2022 17:00) (80 - 109)  RR: 11 (28 Dec 2022 17:00) (9 - 26)  SpO2: 99% (28 Dec 2022 17:00) (95% - 100%)    Parameters below as of 28 Dec 2022 17:00  Patient On (Oxygen Delivery Method): room air      I&O's Detail    27 Dec 2022 07:01  -  28 Dec 2022 07:00  --------------------------------------------------------  IN:    Albumin 5%  - 250 mL: 250 mL    dextrose 5% + lactated ringers: 480 mL    IV PiggyBack: 562.3 mL    IV PiggyBack: 150 mL    Oral Fluid: 1281 mL    PRBCs (Packed Red Blood Cells): 196 mL  Total IN: 2919.3 mL    OUT:    Ileostomy (mL): 3525 mL    Voided (mL): 710 mL  Total OUT: 4235 mL    Total NET: -1315.7 mL      28 Dec 2022 07:01  -  28 Dec 2022 17:10  --------------------------------------------------------  IN:    Heparin: 66 mL    IV PiggyBack: 187.5 mL    Lactated Ringers Bolus: 1000 mL    Oral Fluid: 330 mL  Total IN: 1583.5 mL    OUT:    Ileostomy (mL): 1250 mL    Voided (mL): 550 mL  Total OUT: 1800 mL    Total NET: -216.5 mL      PE:    General: NAD, resting comfortably in bed  C/V: S1 s2, RRR  Pulm: Nonlabored breathing, no respiratory distress  Abd: Soft, NTND, incisions c/d/i, staples in place, ostomy with brown stool in the bag  Extrem: [facility]        LABS:                        9.6    7.39  )-----------( 296      ( 28 Dec 2022 05:44 )             29.5     12-28    136  |  104  |  7   ----------------------------<  99  3.6   |  25  |  0.74    Ca    8.1<L>      28 Dec 2022 05:44  Phos  2.4     12-28  Mg     1.9     12-28    TPro  5.4<L>  /  Alb  2.4<L>  /  TBili  1.4<H>  /  DBili  x   /  AST  24  /  ALT  24  /  AlkPhos  98  12-28    PT/INR - ( 28 Dec 2022 05:44 )   PT: 14.9 sec;   INR: 1.25          PTT - ( 28 Dec 2022 05:44 )  PTT:30.9 sec      RADIOLOGY & ADDITIONAL STUDIES:

## 2022-12-28 NOTE — PROGRESS NOTE ADULT - ATTENDING COMMENTS
Briefly, 78 y/o M with h/o heroin use x 20 years found unresponsive at his nursing home, resolved after narcan in the field, and brought to [facility]. Found to have LV dysfunction, sub-segmental PE, atrial flutter, and large LV thrombus on echo. Transferred to [facility] 12/7 for further management. Course complicated by acute mesenteric ischemia 12/11 now s/p embolectomy and bowel resection with ostomy, septic shock, LLE arterial occlusion. He has had a protracted course with overall failure to thrive and is now awaiting AKA of LLE. Given dig with improvement in HR. Noted to be in flutter. On exam, chronically ill, JVP low, RIJ TLC, irreg irreg rhythm, CTAB, ostomy w/ stool, no pedal edema, bandaged. Labs reviewed - Hb 7.9, BUN/Cr 7/1.2, albumin 2.0. TTE reviewed - EF 15-20%, LVEDD 5.2 cm (inferior wall akinesis, apex akinetic), LV stasis (thrombus not clearly visualized). Overall stage C HF, NYHA class IV who is hypovolemic but has multiple comorbidities which is impairing his QoL.  - appreciate pall care c/w for GOC conversation; DNR/DNI which he will rescind for AKA. Currently at intermediate risk for CV event for intermediate risk surgery however compensated at current moment. Would ideally do under local nerve block.   - hold diuretics; give albumin/PRBC for goal CVP 6-8  - switch lopressor to toprol 25 mg daily   - LV thrombus; hep on hold due to GIB  - afib; dig as above  - discussed with patient disease process at length

## 2022-12-28 NOTE — PROGRESS NOTE ADULT - PROBLEM SELECTOR PLAN 4
Hospital course complicated by acute GI bleed, requiring intermittent pressor support as well as multiple transfusions  -now with stable H/H. s/p 1u pRBC on 12/27, with hgb improving to 9.6  -holding full AC for Aflutter at this time, currently on DVT ppx

## 2022-12-28 NOTE — PROGRESS NOTE ADULT - SUBJECTIVE AND OBJECTIVE BOX
OVERNIGHT EVENTS: Received 1u prbc and 250cc of 5% albumin. HR improved to 100-110s    SUBJECTIVE:  Patient seen and examined at bedside.    Vital Signs Last 12 Hrs  T(F): 98.8 (12-28-22 @ 14:54), Max: 98.8 (12-28-22 @ 14:54)  HR: 108 (12-28-22 @ 15:00) (87 - 109)  BP: 123/70 (12-28-22 @ 15:00) (117/68 - 147/66)  BP(mean): 94 (12-28-22 @ 15:00) (80 - 109)  RR: 12 (12-28-22 @ 15:00) (9 - 21)  SpO2: 98% (12-28-22 @ 15:00) (97% - 100%)  I&O's Summary    27 Dec 2022 07:01  -  28 Dec 2022 07:00  --------------------------------------------------------  IN: 2919.3 mL / OUT: 4235 mL / NET: -1315.7 mL    28 Dec 2022 07:01  -  28 Dec 2022 15:10  --------------------------------------------------------  IN: 1561.5 mL / OUT: 1800 mL / NET: -238.5 mL        PHYSICAL EXAM:  Constitutional: NAD, comfortable in bed. speaking in full sentences  HEENT: NC/AT, MMM  Neck: Supple, + JVD  Respiratory: CTA B/L. No w/r/r.   Cardiovascular: RRR, normal S1 and S2, no m/r/g.   Gastrointestinal: +BS, soft NTND, +ostomy  Extremities: RLE warm with no edema. LLE cool and necrotic appearing           LABS:                        9.6    7.39  )-----------( 296      ( 28 Dec 2022 05:44 )             29.5     12-28    136  |  104  |  7   ----------------------------<  99  3.6   |  25  |  0.74    Ca    8.1<L>      28 Dec 2022 05:44  Phos  2.4     12-28  Mg     1.9     12-28    TPro  5.4<L>  /  Alb  2.4<L>  /  TBili  1.4<H>  /  DBili  x   /  AST  24  /  ALT  24  /  AlkPhos  98  12-28    PT/INR - ( 28 Dec 2022 05:44 )   PT: 14.9 sec;   INR: 1.25          PTT - ( 28 Dec 2022 05:44 )  PTT:30.9 sec        RADIOLOGY & ADDITIONAL TESTS:    MEDICATIONS  (STANDING):  chlorhexidine 2% Cloths 1 Application(s) Topical <User Schedule>  dextrose 5%. 1000 milliLiter(s) (100 mL/Hr) IV Continuous <Continuous>  dextrose 5%. 1000 milliLiter(s) (50 mL/Hr) IV Continuous <Continuous>  digoxin  Injectable 125 MICROGram(s) IV Push daily  dronabinol 2.5 milliGRAM(s) Oral every 12 hours  glucagon  Injectable 1 milliGRAM(s) IntraMuscular once  heparin  Infusion 1100 Unit(s)/Hr (11 mL/Hr) IV Continuous <Continuous>  influenza  Vaccine (HIGH DOSE) 0.7 milliLiter(s) IntraMuscular once  insulin lispro (ADMELOG) corrective regimen sliding scale   SubCutaneous every 6 hours  loperamide 2 milliGRAM(s) Oral every 12 hours  metoprolol tartrate 12.5 milliGRAM(s) Oral every 12 hours  pantoprazole  Injectable 40 milliGRAM(s) IV Push two times a day  psyllium Powder 1 Packet(s) Oral every 12 hours  silver sulfADIAZINE 1% Cream 1 Application(s) Topical daily    MEDICATIONS  (PRN):  acetaminophen     Tablet .. 650 milliGRAM(s) Oral every 6 hours PRN Temp greater or equal to 38C (100.4F), Mild Pain (1 - 3)  dextrose Oral Gel 15 Gram(s) Oral once PRN Blood Glucose LESS THAN 70 milliGRAM(s)/deciliter

## 2022-12-28 NOTE — PROGRESS NOTE ADULT - ASSESSMENT
76 y/o M with Significant PMHx of IVDU found unresponsive at his nursing home, resolved after Narcan in the field, and brought to [facility]. Found to have PE, atrial flutter, and large LV thrombus on echo. Transferred to [facility] for further management. Pt C/o abdominal pain on 12/10 CTA showing mid SMA with embolus. Abnormal distal small bowel loops and cecum with dilatation and pneumatosis suggesting infarcted bowel. One or two tiny foci of  intrahepatic portal vein pneumatosis. Segmental infarction upper pole left kidney. Now s/p Ex lap, SMA embolectomy, 80cm SBR, abthera vac left in discontinuity (12/11) and transferred to SICU intubated. S/p second look (12/13) and most recently s/p OR for 3rd look, end-to-end anastomosis of remaining bowel, loop ileostomy and abdomen closure (12/15). Remains in SICU now extubated with limb ischemia to LLE pending amputation. While the ischemia has not yet fully demarcated, it is clear that the posterior calf (which is needed to heal a BKA flap) is involved and therefore only surgical option available to the patient is an AKA. Restarting hep gtt today.     Plan:   - Examined leg this am. Dry gangrene of toes and part of foot with edema from the foot to the knee with no induration, fluctuation, or any sign of infection at this time. There is no concern for wet gangrene at this time.   - Planning for AKA once patient's clinical status is improved. Appreciate electrophysiology/surgery input. May proceed with procedure after patient is ablated for his afib  - Continue local wound care.  - Rest of care per SICU  - Vascular surgery Team 3C will continue to follow. Please call [phone] with any questions or concerns.

## 2022-12-28 NOTE — PROGRESS NOTE ADULT - PROBLEM SELECTOR PLAN 1
EF 10-15%, LVIDD 5. Severely reduced RV function.   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

## 2022-12-28 NOTE — PROGRESS NOTE ADULT - PROBLEM SELECTOR PLAN 2
- AC held for bleeding  - EP following, LORENZO/DCCV delayed, may assist with rate control  - S/p Digoxin (250mcg q6h for 3 doses on 12/27) with improved HR to 90-110s. Continue Digoxin 125mcg daily

## 2022-12-28 NOTE — PROGRESS NOTE ADULT - ASSESSMENT
76yo M with no significant PMH/PSx admitted to cardiology service on 12/7 in the setting of severely reduced LV function 2/2 an LV thrombus. Pt now with several days of post-prandial abdominal pain and loose BMs, initially suspected to be 2/2 opioid withdrawal, however on evening of 12/10 had a large bloody BM. 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 Vascular surgery (already following) with plan for OR. General surgery consulted for possible operative assistance in the setting of bowel ischemia. Now POD2 s/p ex lap, SMA embolectomy and small bowel resection. Transferred to CCU on 12/21/22 for cardiac optimization and now transferred back to SICU 12/22/22 for bleeding hemodynamic instability. CTA performed demonstrating large hematoma in R abdomen, new hemoperitoneum, and bilateral pleural effusions.     Plan:    -Plan for AKA tomorrow with Vascular surgery  -Monitor ileostomy output  -Continue Imodium  -SQH  -Appreciate vascular surgery recs  -Continue diet  -IVF  -Rest of care per SICU    -Surgery Team 4C will continue to follow. Please page Team 4 with questions/clinical changes. [phone]

## 2022-12-28 NOTE — PROGRESS NOTE ADULT - ASSESSMENT
77M with PMHx of IVDU found unresponsive at his nursing home, resolved after Narcan in the field and brought to [facility]. Found to have PE, atrial flutter, and large LV thrombus on echo. Transferred to [facility] for further management. Pt c/o abdominal pain on 12/10 CTA showing mid SMA with embolus. Abnormal distal small bowel loops and cecum with dilatation and pneumatosis suggesting infarcted bowel. One or two tiny foci of intrahepatic portal vein pneumatosis. Segmental infarction upper pole left kidney. Now s/p Ex lap, SMA embolectomy, 80cm SBR, abthera vac left in discontinuity (12/11) and transferred to SICU intubated. S/p second look (12/13) and most recently s/p OR for 3rd look, end-to-end anastomosis of remaining bowel, loop ileostomy and abdomen closure (12/15). Remains in SICU, now extubated with still with limb ischemia to LLE pending amputation and AC held given BRBPR and hematoma.    Neuro: Pain: IV Tylenol ATC  Psych: Depressed mood. No longer agitated or suicidal. Dronabinol for appetite.  CV: Septic shock--resolved; AFlutter  Digoxin: 125 mcg daily Metop 12.5 BID; TTE (12/7) CHF- Severe RV dysfxn and LVEF 15%, holding Spironolactone and Valsartan. LV thrombus w LLE limb ischemia- Heparin gtt resumed 12/28/22 (goal PTT 60-90). ASA 81 held. Pt agreeable for AKA, needs Cardiology following for preop optimization.   Pulm: on room air  GI: Reg/High fiber diet + ensures, metamucil BID, appetite improving w/ dorbinol; PPI. Ileostomy high output--increased to imodium 2 BID.  : Voids- Infarction of upper pole of Left Kidney, Renal US negative on 12/12. Uriarte.   ID: Off ABX // Dry gangrene no ABX.  Ischemic bowel: Zosyn (12/11-12/21), LLE wounds Vanc (12-19-21).   Heme: Hep C positive; Active T&S; Hg goal > 8  Endo: mISS, Hypoglycemia: D5LR @60  PPx: SCD. heparin gtt  Lines: PIVs // DC: Rt TLC (12/11-12/17), L radial lizette (12/11-12/20). R Lizette (--12/26).

## 2022-12-29 NOTE — PROGRESS NOTE ADULT - PROBLEM SELECTOR PLAN 7
.  - DNR DNI 12/27 MOLST completed-- ok to rescind for procedure/AKA  - [facility] sister Sophie Strange # [phone] (cell)  [phone]

## 2022-12-29 NOTE — PROGRESS NOTE ADULT - PROBLEM SELECTOR PLAN 4
Hospital course complicated by acute GI bleed, requiring intermittent pressor support as well as multiple transfusions  -now with stable H/H. s/p 1u pRBC on 12/27, with hgb improving to 9-10 and remains stable for the past few days  -holding full AC for Aflutter at this time, currently on DVT ppx

## 2022-12-29 NOTE — PROGRESS NOTE ADULT - SUBJECTIVE AND OBJECTIVE BOX
ON: PTT 64.9- no change, next PTT @ 10pm 52 --> Hep gtt up to 12. Next PTT at AM labs.  12/28: K repleted. UO low, Ostomy output high. 1L LR bolus, Hep gtt started @11/hr; EKG confirmed rate control aflutter;     SUBJECTIVE: Patient seen and examined bedside; no acute events overnight, HD stable, sleepy.    digoxin  Injectable 125 MICROGram(s) IV Push every 24 hours  heparin  Infusion 1200 Unit(s)/Hr IV Continuous <Continuous>  metoprolol tartrate 12.5 milliGRAM(s) Oral every 12 hours      Vital Signs Last 24 Hrs  T(C): 36.8 (29 Dec 2022 10:00), Max: 37.8 (28 Dec 2022 22:03)  T(F): 98.3 (29 Dec 2022 10:00), Max: 100.1 (28 Dec 2022 22:03)  HR: 86 (29 Dec 2022 10:00) (86 - 133)  BP: 140/83 (29 Dec 2022 10:00) (105/67 - 148/65)  BP(mean): 106 (29 Dec 2022 10:00) (80 - 116)  RR: 22 (29 Dec 2022 10:00) (11 - 23)  SpO2: 100% (29 Dec 2022 10:00) (92% - 100%)    Parameters below as of 29 Dec 2022 11:00  Patient On (Oxygen Delivery Method): room air      I&O's Detail    28 Dec 2022 07:01  -  29 Dec 2022 07:00  --------------------------------------------------------  IN:    Heparin: 145 mL    Heparin: 86 mL    IV PiggyBack: 25 mL    IV PiggyBack: 187.5 mL    Lactated Ringers Bolus: 1000 mL    Oral Fluid: 807 mL  Total IN: 2250.5 mL    OUT:    Ileostomy (mL): 3900 mL    Voided (mL): 1400 mL  Total OUT: 5300 mL    Total NET: -3049.5 mL      29 Dec 2022 07:01  -  29 Dec 2022 10:48  --------------------------------------------------------  IN:    Heparin: 39 mL    IV PiggyBack: 25 mL    IV PiggyBack: 200 mL    IV PiggyBack: 999 mL  Total IN: 1263 mL    OUT:  Total OUT: 0 mL    Total NET: 1263 mL      PE:    General: NAD, resting comfortably in bed  C/V: S1 s2, atrial flutter per monitor  Pulm: Nonlabored breathing, no respiratory distress  Abd: Soft, NTND, ostomy with soft brown stool in bag  Extrem: [facility]        LABS:                        10.0   5.29  )-----------( 313      ( 29 Dec 2022 05:30 )             30.2     12-29    135  |  102  |  10  ----------------------------<  102<H>  3.7   |  24  |  0.80    Ca    8.3<L>      29 Dec 2022 05:30  Phos  2.3     12-29  Mg     1.9     12-29    TPro  5.7<L>  /  Alb  2.5<L>  /  TBili  1.6<H>  /  DBili  x   /  AST  27  /  ALT  25  /  AlkPhos  105  12-29    PT/INR - ( 29 Dec 2022 05:30 )   PT: 15.9 sec;   INR: 1.33          PTT - ( 29 Dec 2022 05:30 )  PTT:55.6 sec      RADIOLOGY & ADDITIONAL STUDIES:

## 2022-12-29 NOTE — PROGRESS NOTE ADULT - PROBLEM SELECTOR PLAN 6
.  CHF Severe RV dysfxn etiology tachy induced cardiomyopathy   - 12/27 shows EF 10-15% with overall hypokinesis  - appreciate cards, HF recs  - hospice eligibility pending hospital course

## 2022-12-29 NOTE — CHART NOTE - NSCHARTNOTEFT_GEN_A_CORE
Admitting Diagnosis:   Patient is a 77y old  Male who presents with a chief complaint of A flutter (29 Dec 2022 10:47)      PAST MEDICAL & SURGICAL HISTORY:        Current Nutrition Order:  NPO@12 , Regular HIGH fiber Diet, Ensure MAX x3    --Team reports high fiber diet given High OP from ostomy     PO Intake: Good (%) [   ]  Fair (50-75%) [   ] Poor (<25%) [ X  ]  --Marinol is now ordered      GI Issues:   Ostomy: 1450ml , 3250ml    Imodium metamucil and Protonix are ordered     Pain:  No Pain per flow sheets  Pain meds ordered      Skin Integrity:  Buddy 13  +3BL Leg Edema   Sacrum unstageable pressure ulcer, midline spine stage II pressure ulcer   L Elbow and calf skin tear         Labs:       135  |  102  |  10  ----------------------------<  102<H>  3.7   |  24  |  0.80    Ca    8.3<L>      29 Dec 2022 05:30  Phos  2.3       Mg     1.9         TPro  5.7<L>  /  Alb  2.5<L>  /  TBili  1.6<H>  /  DBili  x   /  AST  27  /  ALT  25  /  AlkPhos  105      CAPILLARY BLOOD GLUCOSE      POCT Blood Glucose.: 92 mg/dL (29 Dec 2022 11:03)  POCT Blood Glucose.: 108 mg/dL (28 Dec 2022 22:47)  POCT Blood Glucose.: 99 mg/dL (28 Dec 2022 17:45)      Medications:  MEDICATIONS  (STANDING):  chlorhexidine 2% Cloths 1 Application(s) Topical <User Schedule>  dextrose 5%. 1000 milliLiter(s) (50 mL/Hr) IV Continuous <Continuous>  dextrose 5%. 1000 milliLiter(s) (100 mL/Hr) IV Continuous <Continuous>  digoxin  Injectable 125 MICROGram(s) IV Push every 24 hours  dronabinol 5 milliGRAM(s) Oral every 12 hours  fentaNYL    Injectable 100 MICROGram(s) IV Push once  glucagon  Injectable 1 milliGRAM(s) IntraMuscular once  heparin  Infusion 1200 Unit(s)/Hr (13 mL/Hr) IV Continuous <Continuous>  influenza  Vaccine (HIGH DOSE) 0.7 milliLiter(s) IntraMuscular once  insulin lispro (ADMELOG) corrective regimen sliding scale   SubCutaneous every 6 hours  loperamide 4 milliGRAM(s) Oral three times a day  metoprolol tartrate 12.5 milliGRAM(s) Oral every 12 hours  midazolam Injectable 8 milliGRAM(s) IV Push once  pantoprazole  Injectable 40 milliGRAM(s) IV Push two times a day  phenylephrine    Infusion 0.1 MICROgram(s)/kG/Min (2.42 mL/Hr) IV Continuous <Continuous>  psyllium Powder 1 Packet(s) Oral every 12 hours  silver sulfADIAZINE 1% Cream 1 Application(s) Topical daily    MEDICATIONS  (PRN):  acetaminophen     Tablet .. 650 milliGRAM(s) Oral every 6 hours PRN Temp greater or equal to 38C (100.4F), Mild Pain (1 - 3)  dextrose Oral Gel 15 Gram(s) Oral once PRN Blood Glucose LESS THAN 70 milliGRAM(s)/deciliter          6'6''  pounds +-10%  Wt 142 pounds BMI 16.4 %IBW=66%     Weight Change:    141.9 pounds - dosing wt    136 pounds - Bedscale wt     **PCM:  >> Reports having 1-2 meals/day + ONS. Per pt claims to have 2 ensure/day and 2 nutrament/day - unclear how accurate this is. ?If pt meeting ~75% EER consistently.  >> Does report wt loss.  pounds x6 months ago. Thinks he now is 135 pounds which is consistent with bedscale wt obtained during initial visit by  pounds. Suggest wt loss of 49 pounds/26% body wt loss.  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     Estimated energy needs:  Current body wt for EER based on clinician judgment   Adjust for age, malnutrition, EF, S/p OR, Pressure ulcer; fluids per team  25-30kcal/k-1950kcal/day   1.3-1.5gm/k-98gm prot/day   **Rec upper end of needs     Subjective:  77M with PMHx of IVDU found unresponsive at his nursing home, resolved after Narcan in the field and brought to [facility]. Found to have PE, atrial flutter, and large LV thrombus on echo. Transferred to [facility] for further management. Pt c/o abdominal pain on 12/10 CTA showing mid SMA with embolus. Abnormal distal small bowel loops and cecum with dilatation and pneumatosis suggesting infarcted bowel. One or two tiny foci of intrahepatic portal vein pneumatosis. Segmental infarction upper pole left kidney. Now s/p Ex lap, SMA embolectomy, 80cm SBR, abthera vac left in discontinuity () and transferred to SICU intubated. S/p second look () and most recently s/p OR for 3rd look, end-to-end anastomosis of remaining bowel, loop ileostomy and abdomen closure (12/15). Transferred to CCU on  for cardiac optimization and now transferred back to SICU  for bleeding hemodynamic instability. Echo  shows EF 10-15% with overall hypokinesis. CTA performed demonstrating large hematoma in R abdomen, new hemoperitoneum, and bilateral pleural effusions. Remains in SICU, now extubated with still with limb ischemia to LLE pending amputation and AC held given BRBPR and hematoma; noted pt agreeable for AKA when feasible - AKA currently Pending Cards. Pt is Pending LORENZO by Cardiology today, plan for cardio-conversion tomorrow . Palliative cont to follow.     Pt seen on 5E[facility], Under SICU team. Pt providing limited info this AM. Attempted to obtain food preference however only able to report wanting a beef jada which is not offered at [facility]. Calorie count had been ordered - only 2 meals documented in which pt noted to be consuming ~25% of items ordered at each meal with the exception to 1 ensure MAX which was consumed 100%. Of note pt since made NPO for LORENZO today. Calorie count d/c given NPO status in pt with already known poor PO intake. Should pt PO cont at rate noted prior to NPO pt likely to be unable to meet EER via PO alone. Additionally Despite PO intake pt is likely to benefit from alternate means Pending consistency with GOC given prolonged inadequate intake/diet during admit as well as current medical conditions which warrant increased EER.   Please see below for nutritions recommendations.     Prior PES: Malnutrition Severe in Chronic state RT presumed intake<EER AEB NFPE, wt loss, PO intake  >>on going  Goal: Pt will meet at least 75% of nutrient needs via feasible route / Show no further s/s of malnutrition    Recommendations:  1. As medically feasible and tolerated, resume PO diet: Realgar diet, ensure MAX x3/day 150kcal/30gm prot per 1 can.   2. Monitor Diet tolerance, %PO intake. As feasible, Cont with appetite stimulant.    3. Would STRONGLY consider use of alternate means of nutrition given prolonged inadequate intake/diet during admit + poor PO intake while on PO diet, in pt who is malnourished with Skin Breakdown, high Ostomy OP who is now Pending further OR interventions.     -- Should alternate means begin, Pending consistency with GOC, Please reconsult for Recs.   4. Monitor Skin, Wts daily, GOC. Pain/GI per team.   5. Labs: monitor BMP, CBC, glucose, lytes - Replete PRN, trend renal indices, LFts, POCT.  6. RD to remain available for additional Recs.    ** D/w team.     Education: Encouraged PO intake.     Risk Level: High [X   ] Moderate [   ] Low [   ].

## 2022-12-29 NOTE — PROGRESS NOTE ADULT - PROBLEM SELECTOR PLAN 3
Now s/p Exlap Embolectomy of SMA and resection of 80cm of SB  - plan for AKA today, he is overall an intermediate risk for MACE for this intermediate risk procedure, please perform under local nerve block if feasible to minimize risk of general anaesthesia   - care per surgery/vascular

## 2022-12-29 NOTE — PROGRESS NOTE ADULT - ASSESSMENT
78 y/o M with Significant PMHx of IVDU found unresponsive at his nursing home, resolved after Narcan in the field, and brought to [facility] Found to have PE, atrial flutter, and large LV thrombus on echo. Transferred to [facility] for further management. Pt C/o abdominal pain on 12/10 CTA showing mid SMA with embolus. Abnormal distal small bowel loops and cecum with dilatation and pneumatosis suggesting infarcted bowel. One or two tiny foci of  intrahepatic portal vein pneumatosis. Segmental infarction upper pole left kidney. Now s/p Ex lap, SMA embolectomy, 80cm SBR, abthera vac left in discontinuity (12/11) and transferred to SICU intubated. S/p second look (12/13) and most recently s/p OR for 3rd look, end-to-end anastomosis of remaining bowel, loop ileostomy and abdomen closure (12/15). Remains in SICU now extubated with limb ischemia to LLE pending amputation. While the ischemia has not yet fully demarcated, it is clear that the posterior calf (which is needed to heal a BKA flap) is involved and therefore only surgical option available to the patient is an AKA. Restarting hep gtt yesterday - possible cardioversion today vs tomorrow.     Plan:   - Examined leg this am. Dry gangrene of toes and part of foot with edema from the foot to the knee with no induration, fluctuation, or any sign of infection at this time. There is no concern for wet gangrene at this time.   - Planning for AKA once patient's clinical status is improved. Appreciate electrophysiology/surgery input.   - Continue local wound care with silverdine to blisters and kerlex  - Rest of care per SICU  - Vascular surgery Team 3C will continue to follow. Please call [phone] with any questions or concerns.

## 2022-12-29 NOTE — PROGRESS NOTE ADULT - PROBLEM SELECTOR PLAN 5
Hospital course complicated by LLE limb ischemia. Physical exam notable for areas of dry gangrene to the LLE, no signs of active infection.   -Patient amenable to AKA and plan for surgery today

## 2022-12-29 NOTE — PROGRESS NOTE ADULT - PROBLEM SELECTOR PLAN 2
.  alb 2.5  Clinical evidence indicates that the patient has Severe protein calorie malnutrition/ 3rd degree  In context of     Acute Illness/Injury (>7days)    vs    Chronic Illness (>1 month)    Energy/Food intake <50% of estimated energy requirement >5 days  Weight loss: Moderate - severe  Body Fat loss: Severe   +Cachexia, temporal wasting, muscle atrophy  Muscle mass loss: Severe  No Skin failure/pressure ulcers  Fluid Accumulation: Severe + pleural effusions   Strength: weakened severe +bedbound    Recommend:   - poor appetite as above  - nutritional supplements as tolerated, nutrition consult  - can consider adding omega 3 and melatonin anticytokine agents increase fat

## 2022-12-29 NOTE — PROGRESS NOTE ADULT - PROBLEM SELECTOR PLAN 3
.  remains bedbound, pps 30%, able to feed self but requires assistance with ADLs  - cw supportive care, reposition q2, skin care   - PT when appropriate

## 2022-12-29 NOTE — PROGRESS NOTE ADULT - SUBJECTIVE AND OBJECTIVE BOX
SUBJECTIVE: interval events noted: pt rate controlled. tte/cardioversion delayed. pending AKA today. this morning pt denies pain. feels his weakness and fatigue is unchanged compared to last week. feels hungry, currently NPO. Continues to feel depressed, is trying to take it "one day at a time." he is happy that he is slowly, but increasingly able to independently move/reposition himself in bed. No prn analgesics used in last 24hs. Comprehensive ROS as below.     ALLERGIES: No Known Allergies    MEDICATIONS: REVIEWED  MEDICATIONS  (STANDING):  chlorhexidine 2% Cloths 1 Application(s) Topical <User Schedule>  dextrose 5%. 1000 milliLiter(s) (50 mL/Hr) IV Continuous <Continuous>  dextrose 5%. 1000 milliLiter(s) (100 mL/Hr) IV Continuous <Continuous>  digoxin  Injectable 125 MICROGram(s) IV Push every 24 hours  dronabinol 5 milliGRAM(s) Oral every 12 hours  glucagon  Injectable 1 milliGRAM(s) IntraMuscular once  heparin  Infusion 1200 Unit(s)/Hr (13 mL/Hr) IV Continuous <Continuous>  influenza  Vaccine (HIGH DOSE) 0.7 milliLiter(s) IntraMuscular once  insulin lispro (ADMELOG) corrective regimen sliding scale   SubCutaneous every 6 hours  loperamide 4 milliGRAM(s) Oral three times a day  metoprolol tartrate 12.5 milliGRAM(s) Oral every 12 hours  pantoprazole  Injectable 40 milliGRAM(s) IV Push two times a day  phenylephrine    Infusion 0.1 MICROgram(s)/kG/Min (2.42 mL/Hr) IV Continuous <Continuous>  psyllium Powder 1 Packet(s) Oral every 12 hours  silver sulfADIAZINE 1% Cream 1 Application(s) Topical daily    MEDICATIONS  (PRN):  acetaminophen     Tablet .. 650 milliGRAM(s) Oral every 6 hours PRN Temp greater or equal to 38C (100.4F), Mild Pain (1 - 3)  dextrose Oral Gel 15 Gram(s) Oral once PRN Blood Glucose LESS THAN 70 milliGRAM(s)/deciliter      Analgesic Use (Scheduled and PRNs) for past 24 hours (6a-6a):  - tylenol q6 PRN x0     PRESENT SYMPTOMS:   [ ]Unable to obtain due to poor mentation   Source if other than patient:  [ ]Family   [ ]Team     Pain [  ] Resolved  Location :    abdomen     Quality: sharp, knife like, electric   Radiation: diffuse   Timing: intermittent with repositioning, deep breathing, coughing     PAIN AD Score:  n/a  [email or web address] (press ctrl +  left click to view)      If [  ], pt denies symptom.   Dyspnea:         [  ]  Anxiety:           [  ]  Difficulty sleeping: [x  ] dt discomfort to back   Fatigue:           [x  ]  Nausea:           [  ]  Loss of appetite:     [  ] improved w marinol   Dysphagia: [  ]  Constipation:   [  ]        Grief Present   [x  ] Yes   [  ] No   Other Symptoms: low, depressed mood     All other review of systems negative [x  ]    ECOG Performance:     4  Current Palliative Performance Scale/Karnofsky Score:  30  %  Preadmit Karnofsky:  50 %          PEx:  General: thin elderly man lying in bed alert  oriented x   3 verbal  Behavioral: depressed mood, withdrawn affect,  cooperative  HEENT: atraumatic, No abnormal lesions, No dry mouth, bl  temporal wasting  RESP: Reg rhythm,  No tachypnea/labored breathing,  CTAB, diminished bilat bases  CV:  tachycardic 100s, aflutter   GI: soft non distended NT  +ostomy   :  bowen  MUSK: weakness x4,  No edema, fully  BB/WC bound  SKIN: No abnormal skin lesions, Poor skin turgor. Toes of LLE w/ dry gangrene. dressing to LLE d/c/i  NEURO:  No deficits, cognitive impairment, encephalopathic dsyphagia dysarthria paresis  Oral intake ability: full capability    T(C): 36.8 (12-29-22 @ 10:00), Max: 37.8 (12-28-22 @ 22:03)  HR: 114 (12-29-22 @ 15:00) (86 - 133)  BP: 103/62 (12-29-22 @ 15:00) (103/62 - 151/91)  RR: 14 (12-29-22 @ 15:00) (11 - 23)  SpO2: 100% (12-29-22 @ 15:00) (92% - 100%)  Wt(kg): --    CRITICAL CARE:  [ ]Shock Present  [ ]Septic [ ]Cardiogenic [ ]Neurologic [ ]Hypovolemic    [ ]Vasopressors [ ]Inotropes    [ ]Respiratory failure present   [ ]Mechanical Ventilation   [  ]Non-invasive ventilatory support   [ ]High-Flow  [ ]Acute  [ ]Chronic   [ ]Hypoxic  [ ]Hypercarbic   [ ]Other  [x]Other organ failure : heart     LABS: REVIEWED  CBC:                        10.0   5.29  )-----------( 313      ( 29 Dec 2022 05:30 )             30.2     CMP:    12-29    135  |  102  |  10  ----------------------------<  102<H>  3.7   |  24  |  0.80    Ca    8.3<L>      29 Dec 2022 05:30  Phos  2.3     12-29  Mg     1.9     12-29    TPro  5.7<L>  /  Alb  2.5<L>  /  TBili  1.6<H>  /  DBili  x   /  AST  27  /  ALT  25  /  AlkPhos  105  12-29      RADIOLOGY & ADDITIONAL STUDIES:     DISCUSSION OF CASE:  - discussed POC w primary team

## 2022-12-29 NOTE — PROGRESS NOTE ADULT - SUBJECTIVE AND OBJECTIVE BOX
OVERNIGHT EVENTS: MAYO    SUBJECTIVE:  Patient seen and examined at bedside. Denies abdominal pain, chest pain, shortness of breath.    Vital Signs Last 12 Hrs  T(F): 98.3 (12-29-22 @ 10:00), Max: 98.3 (12-29-22 @ 10:00)  HR: 103 (12-29-22 @ 12:00) (86 - 114)  BP: 122/73 (12-29-22 @ 12:00) (114/58 - 141/68)  BP(mean): 90 (12-29-22 @ 12:00) (81 - 106)  RR: 15 (12-29-22 @ 12:00) (12 - 23)  SpO2: 100% (12-29-22 @ 12:00) (95% - 100%)  I&O's Summary    28 Dec 2022 07:01  -  29 Dec 2022 07:00  --------------------------------------------------------  IN: 2250.5 mL / OUT: 5300 mL / NET: -3049.5 mL    29 Dec 2022 07:01  -  29 Dec 2022 13:17  --------------------------------------------------------  IN: 1363 mL / OUT: 0 mL / NET: 1363 mL      PHYSICAL EXAM:  Constitutional: NAD, comfortable in bed. speaking in full sentences  HEENT: NC/AT, MMM  Neck: Supple, + JVD  Respiratory: CTA B/L. No w/r/r.   Cardiovascular: RRR, normal S1 and S2, no m/r/g.   Gastrointestinal: +BS, soft NTND, +ostomy  Extremities: RLE warm with no edema. LLE cool and necrotic appearing    LABS:                        10.0   5.29  )-----------( 313      ( 29 Dec 2022 05:30 )             30.2     12-29    135  |  102  |  10  ----------------------------<  102<H>  3.7   |  24  |  0.80    Ca    8.3<L>      29 Dec 2022 05:30  Phos  2.3     12-29  Mg     1.9     12-29    TPro  5.7<L>  /  Alb  2.5<L>  /  TBili  1.6<H>  /  DBili  x   /  AST  27  /  ALT  25  /  AlkPhos  105  12-29    PT/INR - ( 29 Dec 2022 05:30 )   PT: 15.9 sec;   INR: 1.33          PTT - ( 29 Dec 2022 12:01 )  PTT:81.7 sec        RADIOLOGY & ADDITIONAL TESTS:    MEDICATIONS  (STANDING):  chlorhexidine 2% Cloths 1 Application(s) Topical <User Schedule>  dextrose 5%. 1000 milliLiter(s) (50 mL/Hr) IV Continuous <Continuous>  dextrose 5%. 1000 milliLiter(s) (100 mL/Hr) IV Continuous <Continuous>  digoxin  Injectable 125 MICROGram(s) IV Push every 24 hours  dronabinol 5 milliGRAM(s) Oral every 12 hours  fentaNYL    Injectable 100 MICROGram(s) IV Push once  glucagon  Injectable 1 milliGRAM(s) IntraMuscular once  heparin  Infusion 1200 Unit(s)/Hr (13 mL/Hr) IV Continuous <Continuous>  influenza  Vaccine (HIGH DOSE) 0.7 milliLiter(s) IntraMuscular once  insulin lispro (ADMELOG) corrective regimen sliding scale   SubCutaneous every 6 hours  loperamide 4 milliGRAM(s) Oral three times a day  metoprolol tartrate 12.5 milliGRAM(s) Oral every 12 hours  midazolam Injectable 8 milliGRAM(s) IV Push once  pantoprazole  Injectable 40 milliGRAM(s) IV Push two times a day  phenylephrine    Infusion 0.1 MICROgram(s)/kG/Min (2.42 mL/Hr) IV Continuous <Continuous>  psyllium Powder 1 Packet(s) Oral every 12 hours  silver sulfADIAZINE 1% Cream 1 Application(s) Topical daily    MEDICATIONS  (PRN):  acetaminophen     Tablet .. 650 milliGRAM(s) Oral every 6 hours PRN Temp greater or equal to 38C (100.4F), Mild Pain (1 - 3)  dextrose Oral Gel 15 Gram(s) Oral once PRN Blood Glucose LESS THAN 70 milliGRAM(s)/deciliter

## 2022-12-29 NOTE — PROGRESS NOTE ADULT - PROBLEM SELECTOR PLAN 5
.  at OSH, Found to have PE, atrial flutter, large LV thrombus. transferred to [facility] 12/7, course complicated by shock (resolved), LLE acute limb ischemia and SMA thrombosis, now s/p SBR w/ loop ileostomy   - pending AKA today, deemed intermediate risk for intermediate risk procedure   - appreciate chronic pain mgmt post op

## 2022-12-29 NOTE — PROGRESS NOTE ADULT - ASSESSMENT
78 y/o M with no significant PMHx found unresponsive at NH, resolved after narcan, and brought to [facility]. Found to have LV dysfunction, PE, atrial flutter, large LV thrombus. Transferred to [facility] for further management, course complicated by acute mesenteric ischemia s/p embolectomy and bowel resection, septic shock, LLE arterial occlusion. Pt w difficulty coping, Palliative Care following for psychosocial support.

## 2022-12-29 NOTE — PROGRESS NOTE ADULT - SUBJECTIVE AND OBJECTIVE BOX
SUBJECTIVE: Pt seen and examined at bedside this am. No acute complaints - denies pain his legs    MEDICATIONS  (STANDING):  chlorhexidine 2% Cloths 1 Application(s) Topical <User Schedule>  dextrose 5%. 1000 milliLiter(s) (100 mL/Hr) IV Continuous <Continuous>  dextrose 5%. 1000 milliLiter(s) (50 mL/Hr) IV Continuous <Continuous>  digoxin  IVPB 125 MICROGram(s) IV Intermittent daily  dronabinol 2.5 milliGRAM(s) Oral every 12 hours  glucagon  Injectable 1 milliGRAM(s) IntraMuscular once  heparin  Infusion 1200 Unit(s)/Hr (13 mL/Hr) IV Continuous <Continuous>  influenza  Vaccine (HIGH DOSE) 0.7 milliLiter(s) IntraMuscular once  insulin lispro (ADMELOG) corrective regimen sliding scale   SubCutaneous every 6 hours  loperamide 2 milliGRAM(s) Oral every 12 hours  metoprolol tartrate 12.5 milliGRAM(s) Oral every 12 hours  pantoprazole  Injectable 40 milliGRAM(s) IV Push two times a day  potassium chloride  10 mEq/100 mL IVPB 10 milliEquivalent(s) IV Intermittent every 1 hour  psyllium Powder 1 Packet(s) Oral every 12 hours  silver sulfADIAZINE 1% Cream 1 Application(s) Topical daily    MEDICATIONS  (PRN):  acetaminophen     Tablet .. 650 milliGRAM(s) Oral every 6 hours PRN Temp greater or equal to 38C (100.4F), Mild Pain (1 - 3)  dextrose Oral Gel 15 Gram(s) Oral once PRN Blood Glucose LESS THAN 70 milliGRAM(s)/deciliter      Vital Signs Last 24 Hrs  T(C): 36.8 (29 Dec 2022 05:13), Max: 37.8 (28 Dec 2022 22:03)  T(F): 98.2 (29 Dec 2022 05:13), Max: 100.1 (28 Dec 2022 22:03)  HR: 93 (29 Dec 2022 08:00) (88 - 133)  BP: 119/69 (29 Dec 2022 08:00) (105/67 - 148/65)  BP(mean): 89 (29 Dec 2022 08:00) (80 - 116)  RR: 23 (29 Dec 2022 08:00) (9 - 23)  SpO2: 99% (29 Dec 2022 08:00) (92% - 100%)    Parameters below as of 29 Dec 2022 08:00  Patient On (Oxygen Delivery Method): room air        Physical Exam  General: NAD, resting comfortably in bed  Pulmonary: Nonlabored breathing, no respiratory distress  Cardiovascular: tachycardic, regular.   Abd: soft, ileostomy pink, ostomy bag with brown liquid output, midline incision with staples  Extremities: Toes of LLE w/ dry gangrene. Large bullae of anterior and lateral foot, blisters of anterior left calf have opened, dressed in kerlix with serous drainage from the desquamated patches. Diffuse mild edema of LLE. LLE cool. No signs of infection or wet gangrene. No L DP/PT signals        I&O's Detail    28 Dec 2022 07:01  -  29 Dec 2022 07:00  --------------------------------------------------------  IN:    Heparin: 145 mL    Heparin: 86 mL    IV PiggyBack: 25 mL    IV PiggyBack: 187.5 mL    Lactated Ringers Bolus: 1000 mL    Oral Fluid: 807 mL  Total IN: 2250.5 mL    OUT:    Ileostomy (mL): 3900 mL    Voided (mL): 1400 mL  Total OUT: 5300 mL    Total NET: -3049.5 mL      29 Dec 2022 07:01  -  29 Dec 2022 08:46  --------------------------------------------------------  IN:    Heparin: 26 mL    IV PiggyBack: 100 mL    IV PiggyBack: 25 mL  Total IN: 151 mL    OUT:  Total OUT: 0 mL    Total NET: 151 mL          LABS:                        10.0   5.29  )-----------( 313      ( 29 Dec 2022 05:30 )             30.2     12-29    135  |  102  |  10  ----------------------------<  102<H>  3.7   |  24  |  0.80    Ca    8.3<L>      29 Dec 2022 05:30  Phos  2.3     12-29  Mg     1.9     12-29    TPro  5.7<L>  /  Alb  2.5<L>  /  TBili  1.6<H>  /  DBili  x   /  AST  27  /  ALT  25  /  AlkPhos  105  12-29    PT/INR - ( 29 Dec 2022 05:30 )   PT: 15.9 sec;   INR: 1.33          PTT - ( 29 Dec 2022 05:30 )  PTT:55.6 sec      RADIOLOGY & ADDITIONAL STUDIES:

## 2022-12-29 NOTE — PROGRESS NOTE ADULT - PROBLEM SELECTOR PLAN 4
.  AC currently held dt bleeding. rate controlled.  - continue care per primary team  - EP recs appreciated: tte/cardioversion delayed

## 2022-12-29 NOTE — PROGRESS NOTE ADULT - PROBLEM SELECTOR PLAN 1
EF 10-15%, LVIDD 5. Severely reduced RV function.   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

## 2022-12-29 NOTE — PROGRESS NOTE ADULT - ASSESSMENT
77M with PMHx of IVDU found unresponsive at his nursing home, resolved after Narcan in the field and brought to [facility]. Found to have PE, atrial flutter, and large LV thrombus on echo. Transferred to [facility] for further management. Pt c/o abdominal pain on 12/10 CTA showing mid SMA with embolus. Abnormal distal small bowel loops and cecum with dilatation and pneumatosis suggesting infarcted bowel. One or two tiny foci of intrahepatic portal vein pneumatosis. Segmental infarction upper pole left kidney. Now s/p Ex lap, SMA embolectomy, 80cm SBR, abthera vac left in discontinuity (12/11) and transferred to SICU intubated. S/p second look (12/13) and most recently s/p OR for 3rd look, end-to-end anastomosis of remaining bowel, loop ileostomy and abdomen closure (12/15). Remains in SICU, now extubated with still with limb ischemia to LLE pending amputation and AC held given BRBPR and hematoma.    Neuro: Pain: IV Tylenol ATC  Psych: Depressed mood. No longer agitated or suicidal. Dronabinol for appetite.  CV: Septic shock--resolved; AFlutter s/p Amio gtt and now on 125 mcg QD Digoxin: Metop 12.5 BID; TTE (12/7) CHF- Severe RV dysfxn and LVEF 15%, holding Spironolactone and Valsartan. LV thrombus w LLE limb ischemia- Heparin gtt resumed 12/28 (goal PTT 60-90). ASA 81 held. Per EP: LORENZO today and plan for cardio-conversion tomorrow 12/29. Pt agreeable for AKA, plan for surgery at some point after cardioconversion.   Pulm: on room air  GI: Reg/High fiber diet + ensures, metamucil BID, appetite improving w/ dorbinol; PPI. Ileostomy high output--increased to imodium 2 BID.  : Voids- Infarction of upper pole of Left Kidney, Renal US negative on 12/12. Uriarte.   ID: Off ABX // Dry gangrene no ABX.  Ischemic bowel: Zosyn (12/11-12/21), LLE wounds Vanc (12-19-21).   Heme: Hep C positive; Active T&S; Hg goal > 8  Endo: mISS, Hypoglycemia: D5LR @60  PPx: SCD. hep gtt  Lines: PIVs // DC: Rt TLC (12/11-12/17), L radial lizette (12/11-12/20). R Lizette (--12/26  Dispo: SICU, pending cardio-conversion/surgery 77M with PMHx of IVDU found unresponsive at his nursing home, resolved after Narcan in the field and brought to [facility]. Found to have PE, atrial flutter, and large LV thrombus on echo. Transferred to [facility] for further management. Pt c/o abdominal pain on 12/10 CTA showing mid SMA with embolus. Abnormal distal small bowel loops and cecum with dilatation and pneumatosis suggesting infarcted bowel. One or two tiny foci of intrahepatic portal vein pneumatosis. Segmental infarction upper pole left kidney. Now s/p Ex lap, SMA embolectomy, 80cm SBR, abthera vac left in discontinuity (12/11) and transferred to SICU intubated. S/p second look (12/13) and most recently s/p OR for 3rd look, end-to-end anastomosis of remaining bowel, loop ileostomy and abdomen closure (12/15). Remains in SICU, now extubated with still with limb ischemia to LLE pending amputation and AC held given BRBPR and hematoma.    Neuro: Pain: IV Tylenol ATC  Psych: Depressed mood. No longer agitated or suicidal. Dronabinol for appetite.  CV: Septic shock--resolved; AFlutter s/p Amio gtt and now on 125 mcg QD Digoxin: Metop 12.5 BID; TTE (12/7) CHF- Severe RV dysfxn and LVEF 15%, holding Spironolactone and Valsartan. LV thrombus w LLE limb ischemia- Heparin gtt resumed 12/28 (goal PTT 60-90). ASA 81 held. Per EP: LORENZO today and plan for cardio-conversion tomorrow 12/29. Pt agreeable for AKA, plan for surgery at some point after cardioconversion.   Pulm: on room air  GI: Reg/High fiber diet + ensures, metamucil BID, appetite improving w/ dorbinol; PPI. Ileostomy high output--increased to imodium 2 TID.  : Voids- Infarction of upper pole of Left Kidney, Renal US negative on 12/12. Uriarte.   ID: Off ABX // Dry gangrene no ABX.  Ischemic bowel: Zosyn (12/11-12/21), LLE wounds Vanc (12-19-21).   Heme: Hep C positive; Active T&S; Hg goal > 8  Endo: mISS, Hypoglycemia: D5LR @60  PPx: SCD. hep gtt  Lines: PIVs // DC: Rt TLC (12/11-12/17), L radial lizette (12/11-12/20). R Lizette (--12/26  Dispo: SICU, pending cardio-conversion/surgery

## 2022-12-29 NOTE — PROGRESS NOTE ADULT - ASSESSMENT
76yo M with no significant PMH/PSx admitted to cardiology service on 12/7 in the setting of severely reduced LV function 2/2 an LV thrombus. Pt now with several days of post-prandial abdominal pain and loose BMs, initially suspected to be 2/2 opioid withdrawal, however on evening of 12/10 had a large bloody BM. 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 Vascular surgery (already following) with plan for OR. General surgery consulted for possible operative assistance in the setting of bowel ischemia. Now POD2 s/p ex lap, SMA embolectomy and small bowel resection. Transferred to CCU on 12/21/22 for cardiac optimization and now transferred back to SICU 12/22/22 for bleeding hemodynamic instability. CTA performed demonstrating large hematoma in R abdomen, new hemoperitoneum, and bilateral pleural effusions.     Plan:    -Plan for cardioversion by Cardiology  -Monitor ileostomy output  -Continue Imodium  -Heparin gtt  -Appreciate vascular surgery recs  -Appreciate cardiology recs  -Continue diet  -IVF  -Rest of care per SICU    -Surgery Team 4C will continue to follow. Please page Team 4 with questions/clinical changes. [phone]

## 2022-12-29 NOTE — PROGRESS NOTE ADULT - PROBLEM SELECTOR PLAN 2
- AC held for bleeding  - EP following, LORENZO/DCCV delayed, may assist with rate control  - S/p Digoxin (250mcg q6h for 3 doses on 12/27) with improved HR to 90-100s. Continue Digoxin 125mcg daily

## 2022-12-29 NOTE — PROGRESS NOTE ADULT - PROBLEM SELECTOR PLAN 8
.    Complex medical decision making / symptom management in the setting of advanced illness.    Coping:  well[  ] with difficulty[ x ] poor coping[  ] unable to assess[  ]   Support system: strong[  ] adequate[ x ] inadequate[  ]    Active Psychosocial Referrals:   SW/CM[ x ] PT/OT[  ] Hospice[  ]  Ethics[  ]  Morelia Vidal Patient/Family Support[  ] Chaplaincy[  x ]  Massage Therapy[ x ] Music Therapy [  ] Holistic RN[  ]    Active Outpatient Palliative Care Referrals:  Supportive Oncology Clinic [ ]  Geriatrics Clinic [ ]  [facility] [ ]    - For new or uncontrolled symptoms, please call Palliative Care at [phone]. The service is available 24/7 (including nights & weekends) to provide symptom management recommendations over the phone as appropriate  - Will continue to follow for ongoing GOC discussion / titration of palliative regimen. Emotional support provided, questions answered. .    Complex medical decision making / symptom management in the setting of advanced illness. Ongoing psychosocial support provided at bedside.     Coping:  well[  ] with difficulty[ x ] poor coping[  ] unable to assess[  ]   Support system: strong[  ] adequate[ x ] inadequate[  ]    Active Psychosocial Referrals:   SW/CM[ x ] PT/OT[  ] Hospice[  ]  Ethics[  ]  Morelia Vidal Patient/Family Support[  ] Chaplaincy[  x ]  Massage Therapy[ x ] Music Therapy [  ] Holistic RN[  ]    Active Outpatient Palliative Care Referrals:  Supportive Oncology Clinic [ ]  Geriatrics Clinic [ ]  [facility] [ ]    - For new or uncontrolled symptoms, please call Palliative Care at [phone]. The service is available 24/7 (including nights & weekends) to provide symptom management recommendations over the phone as appropriate  - Will continue to follow for ongoing GOC discussion / titration of palliative regimen. Emotional support provided, questions answered.

## 2022-12-29 NOTE — PROGRESS NOTE ADULT - SUBJECTIVE AND OBJECTIVE BOX
INTERVAL/OVERNIGHT EVENTS:    SUBJECTIVE: Patient seen at bedside in no acute distress. Patient resting peacefully, but easily arousable and conversant. Overall depressed mood. No CP, SOB, fevers or chills.     Neurologic Medications  acetaminophen     Tablet .. 650 milliGRAM(s) Oral every 6 hours PRN Temp greater or equal to 38C (100.4F), Mild Pain (1 - 3)  dronabinol 2.5 milliGRAM(s) Oral every 12 hours    Respiratory Medications    Cardiovascular Medications  digoxin  Injectable 125 MICROGram(s) IV Push every 24 hours  metoprolol tartrate 12.5 milliGRAM(s) Oral every 12 hours    Gastrointestinal Medications  dextrose 5%. 1000 milliLiter(s) IV Continuous <Continuous>  dextrose 5%. 1000 milliLiter(s) IV Continuous <Continuous>  loperamide 2 milliGRAM(s) Oral three times a day  pantoprazole  Injectable 40 milliGRAM(s) IV Push two times a day  potassium chloride  10 mEq/100 mL IVPB 10 milliEquivalent(s) IV Intermittent every 1 hour  psyllium Powder 1 Packet(s) Oral every 12 hours    Genitourinary Medications    Hematologic/Oncologic Medications  heparin  Infusion 1200 Unit(s)/Hr IV Continuous <Continuous>  influenza  Vaccine (HIGH DOSE) 0.7 milliLiter(s) IntraMuscular once    Antimicrobial/Immunologic Medications    Endocrine/Metabolic Medications  dextrose Oral Gel 15 Gram(s) Oral once PRN Blood Glucose LESS THAN 70 milliGRAM(s)/deciliter  glucagon  Injectable 1 milliGRAM(s) IntraMuscular once  insulin lispro (ADMELOG) corrective regimen sliding scale   SubCutaneous every 6 hours    Topical/Other Medications  chlorhexidine 2% Cloths 1 Application(s) Topical <User Schedule>  silver sulfADIAZINE 1% Cream 1 Application(s) Topical daily      MEDICATIONS  (PRN):  acetaminophen     Tablet .. 650 milliGRAM(s) Oral every 6 hours PRN Temp greater or equal to 38C (100.4F), Mild Pain (1 - 3)  dextrose Oral Gel 15 Gram(s) Oral once PRN Blood Glucose LESS THAN 70 milliGRAM(s)/deciliter      I&O's Detail    28 Dec 2022 07:01  -  29 Dec 2022 07:00  --------------------------------------------------------  IN:    Heparin: 145 mL    Heparin: 86 mL    IV PiggyBack: 25 mL    IV PiggyBack: 187.5 mL    Lactated Ringers Bolus: 1000 mL    Oral Fluid: 807 mL  Total IN: 2250.5 mL    OUT:    Ileostomy (mL): 3900 mL    Voided (mL): 1400 mL  Total OUT: 5300 mL    Total NET: -3049.5 mL      29 Dec 2022 07:01  -  29 Dec 2022 10:33  --------------------------------------------------------  IN:    Heparin: 39 mL    IV PiggyBack: 25 mL    IV PiggyBack: 200 mL    IV PiggyBack: 999 mL  Total IN: 1263 mL    OUT:  Total OUT: 0 mL    Total NET: 1263 mL          Vital Signs Last 24 Hrs  T(C): 36.8 (29 Dec 2022 10:00), Max: 37.8 (28 Dec 2022 22:03)  T(F): 98.3 (29 Dec 2022 10:00), Max: 100.1 (28 Dec 2022 22:03)  HR: 86 (29 Dec 2022 10:00) (86 - 133)  BP: 140/83 (29 Dec 2022 10:00) (105/67 - 148/65)  BP(mean): 106 (29 Dec 2022 10:00) (80 - 116)  RR: 22 (29 Dec 2022 10:00) (11 - 23)  SpO2: 100% (29 Dec 2022 10:00) (92% - 100%)    Parameters below as of 29 Dec 2022 11:00  Patient On (Oxygen Delivery Method): room air        General: Cachetic elderly male, appears stated age, responsive to questions, flat affect with improved mood  Neuro: Grossly intact bilaterally   HEENT: Normocephalic, atraumatic, trachea midline, no JVD   Heart: Regular S1/S2, atrial flutter   Lungs: Unlabored breathing on room air; Clear to auscultation bilaterally, no adventitious sounds   Abdomen: Soft, non-distended, normoactive bowel sounds throughout, no tenderness to palpation in all 4 quadrants; RLQ ostomy pink with thin brown liquid, midline laparotomy incision is clean/dry/intact.    : Urine appears concentrated  Upper Extremities: Edema in hands, freely mobile bilaterally   Lower Extremities: Dependent edema, SCDs in place on RLE; RLE warm, LLE cool with dry gangrene of LLE from below the knee down calf, newly formed blisters on dorsum/plantar aspect of L foot dry gangrene of L toes   Neuro: Severe sensory and motor deficits in LLE. Absent sensation in left toes/foot  Skin: Warm except for LLE, non-diaphoretic throughout  LABS:                        10.0   5.29  )-----------( 313      ( 29 Dec 2022 05:30 )             30.2     12-29    135  |  102  |  10  ----------------------------<  102<H>  3.7   |  24  |  0.80    Ca    8.3<L>      29 Dec 2022 05:30  Phos  2.3     12-29  Mg     1.9     12-29    TPro  5.7<L>  /  Alb  2.5<L>  /  TBili  1.6<H>  /  DBili  x   /  AST  27  /  ALT  25  /  AlkPhos  105  12-29    PT/INR - ( 29 Dec 2022 05:30 )   PT: 15.9 sec;   INR: 1.33          PTT - ( 29 Dec 2022 05:30 )  PTT:55.6 sec      RADIOLOGY & ADDITIONAL STUDIES:

## 2022-12-29 NOTE — PROGRESS NOTE ADULT - ASSESSMENT
76 y/o M with no significant PMHx found unresponsive at his nursing home, resolved after narcan in the field, and brought to [facility]. Found to have LV dysfunction (Tachy mediated), sub-segmental PE, atrial flutter, and large LV thrombus on echo. Transferred to [facility] for further management. Course complicated by acute mesenteric ischemia now s/p embolectomy and bowel resection, septic shock, LLE arterial occlusion.  he was recovering well, tolerating the introduction of GDMT and plan was for cardioversion, however, he had another episode of hira hematochezia requiring transfusions and pressors. He has since been weaned off pressors and bleeding has stopped. he tolerated the addition of BB for rate control, however rates remain rapid 120-130s. He is now planned for an AKA today, 12/29.

## 2022-12-29 NOTE — PROGRESS NOTE ADULT - ATTENDING COMMENTS
Briefly, 76 y/o M with h/o heroin use x 20 years found unresponsive at his nursing home, resolved after narcan in the field, and brought to [facility]. Found to have LV dysfunction, sub-segmental PE, atrial flutter, and large LV thrombus on echo. Transferred to [facility] 12/7 for further management. Course complicated by acute mesenteric ischemia 12/11 now s/p embolectomy and bowel resection with ostomy, septic shock, LLE arterial occlusion. He has had a protracted course with overall failure to thrive and is now awaiting AKA of LLE. Given dig with improvement in HR. Noted to be in flutter. Has significant ostomy output. On exam, chronically ill, JVP low, RIJ TLC, irreg irreg rhythm, CTAB, ostomy w/ stool, no pedal edema, bandaged. Labs reviewed - Hb 7.9, BUN/Cr 7/1.2, albumin 2.0. TTE reviewed - EF 15-20%, LVEDD 5.2 cm (inferior wall akinesis, apex akinetic), LV stasis (thrombus not clearly visualized). Overall stage C HF, NYHA class IV who is hypovolemic but has multiple comorbidities which is impairing his QoL.  - appreciate pall care c/w for GOC conversation; DNR/DNI which he will rescind for AKA. Currently at intermediate risk for CV event for intermediate risk surgery however compensated at current moment. Would ideally do under local nerve block.   - hold diuretics; give albumin/PRBC for goal CVP 6-8  - switch lopressor to toprol 25 mg daily once post-op  - LV thrombus; on heparin gtt with caution given prior hemorrhagic shock  - afib; dig as above  - discussed with patient disease process at length

## 2022-12-29 NOTE — PROGRESS NOTE ADULT - PROBLEM SELECTOR PLAN 1
.  cb severe protein vargas malnutrition. iso prolonged, complicated hospital course. improving. currently NPO  - cw marinol 2.5mg PO q12

## 2022-12-30 NOTE — PROGRESS NOTE ADULT - SUBJECTIVE AND OBJECTIVE BOX
INTERVAL/OVERNIGHT EVENTS:    SUBJECTIVE:       Neurologic Medications  acetaminophen     Tablet .. 650 milliGRAM(s) Oral every 6 hours PRN Temp greater or equal to 38C (100.4F), Mild Pain (1 - 3)  dronabinol 5 milliGRAM(s) Oral every 12 hours    Respiratory Medications    Cardiovascular Medications  digoxin  Injectable 125 MICROGram(s) IV Push every 24 hours  metoprolol tartrate 12.5 milliGRAM(s) Oral every 12 hours  phenylephrine    Infusion 0.1 MICROgram(s)/kG/Min IV Continuous <Continuous>    Gastrointestinal Medications  dextrose 5%. 1000 milliLiter(s) IV Continuous <Continuous>  dextrose 5%. 1000 milliLiter(s) IV Continuous <Continuous>  loperamide 4 milliGRAM(s) Oral three times a day  pantoprazole  Injectable 40 milliGRAM(s) IV Push two times a day  psyllium Powder 1 Packet(s) Oral every 12 hours  sodium phosphate 15 milliMole(s)/250 mL IVPB 15 milliMole(s) IV Intermittent once    Genitourinary Medications    Hematologic/Oncologic Medications  heparin  Infusion 1200 Unit(s)/Hr IV Continuous <Continuous>  influenza  Vaccine (HIGH DOSE) 0.7 milliLiter(s) IntraMuscular once    Antimicrobial/Immunologic Medications    Endocrine/Metabolic Medications  dextrose Oral Gel 15 Gram(s) Oral once PRN Blood Glucose LESS THAN 70 milliGRAM(s)/deciliter  glucagon  Injectable 1 milliGRAM(s) IntraMuscular once  insulin lispro (ADMELOG) corrective regimen sliding scale   SubCutaneous every 6 hours    Topical/Other Medications  chlorhexidine 2% Cloths 1 Application(s) Topical <User Schedule>  silver sulfADIAZINE 1% Cream 1 Application(s) Topical daily      MEDICATIONS  (PRN):  acetaminophen     Tablet .. 650 milliGRAM(s) Oral every 6 hours PRN Temp greater or equal to 38C (100.4F), Mild Pain (1 - 3)  dextrose Oral Gel 15 Gram(s) Oral once PRN Blood Glucose LESS THAN 70 milliGRAM(s)/deciliter      I&O's Detail    29 Dec 2022 07:01  -  30 Dec 2022 07:00  --------------------------------------------------------  IN:    Heparin: 195 mL    IV PiggyBack: 25 mL    IV PiggyBack: 999 mL    IV PiggyBack: 300 mL    Oral Fluid: 360 mL  Total IN: 1879 mL    OUT:    Ileostomy (mL): 1000 mL    Voided (mL): 650 mL  Total OUT: 1650 mL    Total NET: 229 mL          Vital Signs Last 24 Hrs  T(C): 37.2 (30 Dec 2022 05:17), Max: 37.2 (30 Dec 2022 05:17)  T(F): 98.9 (30 Dec 2022 05:17), Max: 98.9 (30 Dec 2022 05:17)  HR: 120 (30 Dec 2022 05:00) (86 - 120)  BP: 125/60 (30 Dec 2022 05:00) (102/57 - 151/91)  BP(mean): 81 (30 Dec 2022 05:00) (76 - 114)  RR: 18 (30 Dec 2022 05:00) (11 - 23)  SpO2: 96% (30 Dec 2022 05:00) (93% - 100%)    Parameters below as of 30 Dec 2022 05:00  Patient On (Oxygen Delivery Method): room air        GENERAL: NAD, resting comfortably in bed  HEENT: NCAT, MMM  C/V: Normal rate, normal peripheral perfusion  PULM: Nonlabored breathing, no respiratory distress,   ABD: Soft, ND, NT, no rebound tenderness, no guarding  EXTREM: WWP, no edema, SCDs in place  NEURO: No focal deficits    LABS:                        9.8    5.03  )-----------( 300      ( 30 Dec 2022 05:29 )             29.6     12-30    132<L>  |  100  |  11  ----------------------------<  96  4.1   |  26  |  0.80    Ca    8.1<L>      30 Dec 2022 05:29  Phos  2.6     12-30  Mg     2.0     12-30    TPro  5.7<L>  /  Alb  2.8<L>  /  TBili  1.9<H>  /  DBili  x   /  AST  27  /  ALT  25  /  AlkPhos  105  12-30    PT/INR - ( 30 Dec 2022 05:29 )   PT: 15.8 sec;   INR: 1.32          PTT - ( 30 Dec 2022 05:29 )  PTT:68.6 sec      RADIOLOGY & ADDITIONAL STUDIES:     INTERVAL/OVERNIGHT EVENTS: PTT at 0000 79 --> no chg to hep gtt. preopped for DCCV 12/30/22. Kept metop 12.5BID ordered for NPO status.     SUBJECTIVE: Patient seen at bedside in no acute distress, resting peacefully. Pain controlled. Denies any CP, SOB, fevers or chills.      Neurologic Medications  acetaminophen     Tablet .. 650 milliGRAM(s) Oral every 6 hours PRN Temp greater or equal to 38C (100.4F), Mild Pain (1 - 3)  dronabinol 5 milliGRAM(s) Oral every 12 hours    Respiratory Medications    Cardiovascular Medications  digoxin  Injectable 125 MICROGram(s) IV Push every 24 hours  metoprolol tartrate 12.5 milliGRAM(s) Oral every 12 hours  phenylephrine    Infusion 0.1 MICROgram(s)/kG/Min IV Continuous <Continuous>    Gastrointestinal Medications  dextrose 5%. 1000 milliLiter(s) IV Continuous <Continuous>  dextrose 5%. 1000 milliLiter(s) IV Continuous <Continuous>  loperamide 4 milliGRAM(s) Oral three times a day  pantoprazole  Injectable 40 milliGRAM(s) IV Push two times a day  psyllium Powder 1 Packet(s) Oral every 12 hours  sodium phosphate 15 milliMole(s)/250 mL IVPB 15 milliMole(s) IV Intermittent once    Genitourinary Medications    Hematologic/Oncologic Medications  heparin  Infusion 1200 Unit(s)/Hr IV Continuous <Continuous>  influenza  Vaccine (HIGH DOSE) 0.7 milliLiter(s) IntraMuscular once    Antimicrobial/Immunologic Medications    Endocrine/Metabolic Medications  dextrose Oral Gel 15 Gram(s) Oral once PRN Blood Glucose LESS THAN 70 milliGRAM(s)/deciliter  glucagon  Injectable 1 milliGRAM(s) IntraMuscular once  insulin lispro (ADMELOG) corrective regimen sliding scale   SubCutaneous every 6 hours    Topical/Other Medications  chlorhexidine 2% Cloths 1 Application(s) Topical <User Schedule>  silver sulfADIAZINE 1% Cream 1 Application(s) Topical daily      MEDICATIONS  (PRN):  acetaminophen     Tablet .. 650 milliGRAM(s) Oral every 6 hours PRN Temp greater or equal to 38C (100.4F), Mild Pain (1 - 3)  dextrose Oral Gel 15 Gram(s) Oral once PRN Blood Glucose LESS THAN 70 milliGRAM(s)/deciliter      I&O's Detail    29 Dec 2022 07:01  -  30 Dec 2022 07:00  --------------------------------------------------------  IN:    Heparin: 195 mL    IV PiggyBack: 25 mL    IV PiggyBack: 999 mL    IV PiggyBack: 300 mL    Oral Fluid: 360 mL  Total IN: 1879 mL    OUT:    Ileostomy (mL): 1000 mL    Voided (mL): 650 mL  Total OUT: 1650 mL    Total NET: 229 mL          Vital Signs Last 24 Hrs  T(C): 37.2 (30 Dec 2022 05:17), Max: 37.2 (30 Dec 2022 05:17)  T(F): 98.9 (30 Dec 2022 05:17), Max: 98.9 (30 Dec 2022 05:17)  HR: 120 (30 Dec 2022 05:00) (86 - 120)  BP: 125/60 (30 Dec 2022 05:00) (102/57 - 151/91)  BP(mean): 81 (30 Dec 2022 05:00) (76 - 114)  RR: 18 (30 Dec 2022 05:00) (11 - 23)  SpO2: 96% (30 Dec 2022 05:00) (93% - 100%)    Parameters below as of 30 Dec 2022 05:00  Patient On (Oxygen Delivery Method): room air      General: Cachetic elderly male, appears stated age, responsive to questions, flat affect with improved mood  Neuro: Grossly intact bilaterally   HEENT: Normocephalic, atraumatic, trachea midline, no JVD   Heart: Regular S1/S2, atrial flutter   Lungs: Unlabored breathing on room air; Clear to auscultation bilaterally, no adventitious sounds   Abdomen: Soft, non-distended, normoactive bowel sounds throughout, no tenderness to palpation in all 4 quadrants; RLQ ostomy pink with thin brown liquid, midline laparotomy incision is clean/dry/intact.    : Urine appears concentrated  Upper Extremities: Edema in hands, freely mobile bilaterally   Lower Extremities: Dependent edema, SCDs in place on RLE; RLE warm, LLE cool with dry gangrene of LLE from below the knee down calf, newly formed blisters on dorsum/plantar aspect of L foot dry gangrene of L toes   Neuro: Severe sensory and motor deficits in LLE. Absent sensation in left toes/foot  Skin: Warm except for LLE, non-diaphoretic throughout    LABS:                        9.8    5.03  )-----------( 300      ( 30 Dec 2022 05:29 )             29.6     12-30    132<L>  |  100  |  11  ----------------------------<  96  4.1   |  26  |  0.80    Ca    8.1<L>      30 Dec 2022 05:29  Phos  2.6     12-30  Mg     2.0     12-30    TPro  5.7<L>  /  Alb  2.8<L>  /  TBili  1.9<H>  /  DBili  x   /  AST  27  /  ALT  25  /  AlkPhos  105  12-30    PT/INR - ( 30 Dec 2022 05:29 )   PT: 15.8 sec;   INR: 1.32          PTT - ( 30 Dec 2022 05:29 )  PTT:68.6 sec      RADIOLOGY & ADDITIONAL STUDIES:

## 2022-12-30 NOTE — PROGRESS NOTE ADULT - SUBJECTIVE AND OBJECTIVE BOX
EPS Progress Note    S:     pt been on heparin gtt for 48 hours and h/h stable.   remains in AFL @110s despite metoprolol + digoxin      O: T(C): 37.8 (12-30-22 @ 09:00), Max: 37.8 (12-30-22 @ 09:00)  HR: 113 (12-30-22 @ 09:00) (86 - 123)  BP: 129/64 (12-30-22 @ 09:00) (102/57 - 151/91)  RR: 16 (12-30-22 @ 09:00) (11 - 22)  SpO2: 95% (12-30-22 @ 09:00) (93% - 100%)      PHYSICAL  General:  NAD        Chest:  CTA B/L, no w/r/r  Cardiac:  regular and rapid   Abdomen:  ostomy bag  Extremities: LLE gangrenous.   Skin: no rash noted, normal color and pigmentation  Psych: A&Ox3, normal affect and mood  Neuro: no deficit noted     LABS:                        9.8    5.03  )-----------( 300      ( 30 Dec 2022 05:29 )             29.6     12-30    132<L>  |  100  |  11  ----------------------------<  96  4.1   |  26  |  0.80    Ca    8.1<L>      30 Dec 2022 05:29  Phos  2.6     12-30  Mg     2.0     12-30    TPro  5.7<L>  /  Alb  2.8<L>  /  TBili  1.9<H>  /  DBili  x   /  AST  27  /  ALT  25  /  AlkPhos  105  12-30    PT/INR - ( 30 Dec 2022 05:29 )   PT: 15.8 sec;   INR: 1.32          PTT - ( 30 Dec 2022 05:29 )  PTT:68.6 sec      MEDICATIONS:  acetaminophen     Tablet .. 650 milliGRAM(s) Oral every 6 hours PRN  chlorhexidine 2% Cloths 1 Application(s) Topical <User Schedule>  dextrose 5%. 1000 milliLiter(s) IV Continuous <Continuous>  dextrose 5%. 1000 milliLiter(s) IV Continuous <Continuous>  dextrose Oral Gel 15 Gram(s) Oral once PRN  digoxin  Injectable 125 MICROGram(s) IV Push every 24 hours  dronabinol 5 milliGRAM(s) Oral every 12 hours  glucagon  Injectable 1 milliGRAM(s) IntraMuscular once  heparin  Infusion 1200 Unit(s)/Hr IV Continuous <Continuous>  influenza  Vaccine (HIGH DOSE) 0.7 milliLiter(s) IntraMuscular once  insulin lispro (ADMELOG) corrective regimen sliding scale   SubCutaneous every 6 hours  loperamide 4 milliGRAM(s) Oral three times a day  metoprolol tartrate 12.5 milliGRAM(s) Oral every 12 hours  pantoprazole  Injectable 40 milliGRAM(s) IV Push two times a day  phenylephrine    Infusion 0.1 MICROgram(s)/kG/Min IV Continuous <Continuous>  psyllium Powder 1 Packet(s) Oral every 12 hours  silver sulfADIAZINE 1% Cream 1 Application(s) Topical daily      ASSESSMENT/PLAN  77 M NH resident, found unresponsive, given narcan with resolution EKG at [place] showing typical AFL. RUL subsegmental PE, large LV thrombus, LVEF 10-15%. hospital course c/b bowel embolism requiring resection + embolectomy on 12/10, and initial attempt at initiation of anticoagulation resulted in GIB + hemorrhagic shock.     PT is now stable from a GI bleed standpoint and has been on heparin gtt for 48 hours with a stable h/h. Plan is for cardioversion today to restore sinus rhythm. Pt will have to be on uninterrupted anticoagulation for 30 days after DCCV, surgical teams are aware - There is plan for L AKA but it is not urgent and can be done after 30 days. Pt had a LORENZO yesterday with no evidence of ISIAH thrombus.     -DCCV today, keep NPO  -Pt has an active DNR which has been suspended for the duration of the procedure. It will be reinstated post cardioversion.  -likely initiate oral amiodarone post DCCV

## 2022-12-30 NOTE — PROGRESS NOTE ADULT - ASSESSMENT
76 y/o M with no significant PMHx found unresponsive at his nursing home, resolved after narcan in the field, and brought to [facility]. Found to have LV dysfunction (Tachy mediated), sub-segmental PE, atrial flutter, and large LV thrombus on echo. Transferred to [facility] for further management. Course complicated by acute mesenteric ischemia now s/p embolectomy and bowel resection, septic shock, LLE arterial occlusion.  he was recovering well, tolerating the introduction of GDMT and plan was for cardioversion, however, he had another episode of hira hematochezia requiring transfusions and pressors. He has since been weaned off pressors and bleeding has stopped. Patient now tolerating heparin gtt (stable hemoglobin) and is s/p DCCV on 12/30 (negative LORENZO on 12/29) with sucessful conversion to NSR. Now pending AKA.

## 2022-12-30 NOTE — PROGRESS NOTE ADULT - PROBLEM SELECTOR PLAN 2
RESOLVED  - restarted heparin gtt, no signs of acute bleed  - LORENZO on 12/29 negative for thrombus  - patient s/p DCCV on 12/30 with successful conversion to NSR in 90s  - Per EP, d/c digoxin and transition to PO Amiodarone    Patient now pending AKA, however recommend minimal interruption in AC prior to procedure as patient is now s/p DCCV and increased risk for atrial stunning

## 2022-12-30 NOTE — PROGRESS NOTE ADULT - ASSESSMENT
76 y/o M with Significant PMHx of IVDU found unresponsive at his nursing home, resolved after Narcan in the field, and brought to [facility]. Found to have PE, atrial flutter, and large LV thrombus on echo. Transferred to [facility] for further management. Pt C/o abdominal pain on 12/10 CTA showing mid SMA with embolus. Abnormal distal small bowel loops and cecum with dilatation and pneumatosis suggesting infarcted bowel. One or two tiny foci of  intrahepatic portal vein pneumatosis. Segmental infarction upper pole left kidney. Now s/p Ex lap, SMA embolectomy, 80cm SBR, abthera vac left in discontinuity (12/11) and transferred to SICU intubated. S/p second look (12/13) and most recently s/p OR for 3rd look, end-to-end anastomosis of remaining bowel, loop ileostomy and abdomen closure (12/15). Remains in SICU now extubated with limb ischemia to LLE pending amputation. While the ischemia has not yet fully demarcated, it is clear that the posterior calf (which is needed to heal a BKA flap) is involved and therefore only surgical option available to the patient is an AKA. Restarting hep gtt yesterday - possible cardioversion today.     Plan:   - Examined leg this am. Dry gangrene of toes and part of foot with edema from the foot to the knee with no induration, fluctuation, or any sign of infection at this time. There is no concern for wet gangrene at this time.   - Planning for AKA once patient's clinical status is improved. Appreciate electrophysiology/surgery input.   - Continue local wound care with silverdine to blisters and kerlex  - Rest of care per SICU  - Vascular surgery Team 3C will continue to follow. Please call [phone] with any questions or concerns.       76 y/o M with Significant PMHx of IVDU found unresponsive at his nursing home, resolved after Narcan in the field, and brought to [facility]. Found to have PE, atrial flutter, and large LV thrombus on echo. Transferred to [facility] for further management. Pt C/o abdominal pain on 12/10 CTA showing mid SMA with embolus. Abnormal distal small bowel loops and cecum with dilatation and pneumatosis suggesting infarcted bowel. One or two tiny foci of  intrahepatic portal vein pneumatosis. Segmental infarction upper pole left kidney. Now s/p Ex lap, SMA embolectomy, 80cm SBR, abthera vac left in discontinuity (12/11) and transferred to SICU intubated. S/p second look (12/13) and most recently s/p OR for 3rd look, end-to-end anastomosis of remaining bowel, loop ileostomy and abdomen closure (12/15). Remains in SICU now extubated with limb ischemia to LLE pending amputation. While the ischemia has not yet fully demarcated, it is clear that the posterior calf (which is needed to heal a BKA flap) is involved and therefore only surgical option available to the patient is an AKA. On hep gtt - possible cardioversion today.     Plan:   - Examined leg this am. Dry gangrene of toes and part of foot with edema from the foot to the knee with no induration, fluctuation, or any sign of infection at this time. There is no concern for wet gangrene at this time.   - Planning for AKA once patient's clinical status is improved. Appreciate electrophysiology/surgery input.   - Continue local wound care with silverdine to blisters and kerlex  - Rest of care per SICU  - Vascular surgery Team 3C will continue to follow. Please call [phone] with any questions or concerns.

## 2022-12-30 NOTE — PROGRESS NOTE ADULT - ATTENDING COMMENTS
Briefly, 76 y/o M with h/o heroin use x 20 years found unresponsive at his nursing home, resolved after narcan in the field, and brought to [facility]. Found to have LV dysfunction, sub-segmental PE, atrial flutter, and large LV thrombus on echo. Transferred to [facility] 12/7 for further management. Course complicated by acute mesenteric ischemia 12/11 now s/p embolectomy and bowel resection with ostomy, septic shock, LLE arterial occlusion. He has had a protracted course with overall failure to thrive and is now awaiting AKA of LLE. Given dig with improvement in HR. Underwent LORENZO/DCCV with successful conversion. Ostomy output improved with loperamide. On exam, chronically ill, JVP low, RIJ TLC, RRR, CTAB, ostomy w/ stool, no pedal edema, bandaged. Labs reviewed - Hb 9.8, BUN/Cr 11/0.8, albumin 2.0. TTE reviewed - EF 15-20%, LVEDD 5.2 cm (inferior wall akinesis, apex akinetic), LV stasis (thrombus not clearly visualized). Overall stage C HF, NYHA class IV who is hypovolemic but has multiple comorbidities which is impairing his QoL.  - appreciate pall care c/w for GOC conversation; DNR/DNI which he will rescind for AKA. Currently at intermediate risk for CV event for intermediate risk surgery however compensated at current moment. Would ideally do under local nerve block.   - hold diuretics; give albumin/PRBC for goal CVP 6-8  - c/w toprol 25 mg daily  - LV thrombus; tolerating heparin gtt with caution given prior hemorrhagic shock  - afib; s/p DCCV 12/30; now on amiodarone  - discussed with patient disease process at length

## 2022-12-30 NOTE — PROGRESS NOTE ADULT - PROBLEM SELECTOR PLAN 5
Hospital course complicated by LLE limb ischemia. Physical exam notable for areas of dry gangrene to the LLE, no signs of active infection.   -Patient pending AKA

## 2022-12-30 NOTE — PROGRESS NOTE ADULT - PROBLEM SELECTOR PLAN 3
Now s/p Exlap Embolectomy of SMA and resection of 80cm of SB  - plan for AKA now that patient is s/p DCCV, he is overall an intermediate risk for MACE for this intermediate risk procedure, please perform under local nerve block if feasible to minimize risk of general anaesthesia   - care per surgery/vascular

## 2022-12-30 NOTE — PROGRESS NOTE ADULT - SUBJECTIVE AND OBJECTIVE BOX
ON: PTT at 0000 79 --> no chg to hep gtt. preopped for DCCV 12/30/22. Kept metop 12.5BID ordered for NPO status.   12/29: AM PTT 55- inc to 13/hr, 12p PTT 81.6, 6pm PTT 84; Neg 3L 24hrs - 1L LR bolus x1. Incr Loperamide to 4mg TID, poor PO intake- increased drobinol to 5 BID; LORENZO no L/R atrial nor appendage thrombus.    SUBJECTIVE: Patient seen and examined bedside; no events overnight, HD stable, feeling ok this morning, no complaints.    digoxin  Injectable 125 MICROGram(s) IV Push every 24 hours  heparin  Infusion 1200 Unit(s)/Hr IV Continuous <Continuous>  metoprolol tartrate 12.5 milliGRAM(s) Oral every 12 hours  phenylephrine    Infusion 0.1 MICROgram(s)/kG/Min IV Continuous <Continuous>      Vital Signs Last 24 Hrs  T(C): 37.2 (30 Dec 2022 05:17), Max: 37.2 (30 Dec 2022 05:17)  T(F): 98.9 (30 Dec 2022 05:17), Max: 98.9 (30 Dec 2022 05:17)  HR: 120 (30 Dec 2022 05:00) (86 - 120)  BP: 125/60 (30 Dec 2022 05:00) (102/57 - 151/91)  BP(mean): 81 (30 Dec 2022 05:00) (76 - 114)  RR: 18 (30 Dec 2022 05:00) (11 - 23)  SpO2: 96% (30 Dec 2022 05:00) (93% - 100%)    Parameters below as of 30 Dec 2022 05:00  Patient On (Oxygen Delivery Method): room air      I&O's Detail    29 Dec 2022 07:01  -  30 Dec 2022 07:00  --------------------------------------------------------  IN:    Heparin: 195 mL    IV PiggyBack: 25 mL    IV PiggyBack: 999 mL    IV PiggyBack: 300 mL    Oral Fluid: 360 mL  Total IN: 1879 mL    OUT:    Ileostomy (mL): 1000 mL    Voided (mL): 650 mL  Total OUT: 1650 mL    Total NET: 229 mL      PE:    General: NAD, resting comfortably in bed  C/V: S1 s2, RRR  Pulm: Nonlabored breathing, no respiratory distress  Abd: Soft, NTND, staples still in place, ostomy pink, bag recently changed no stool noted  Extrem: [facility]        LABS:                        9.8    5.03  )-----------( 300      ( 30 Dec 2022 05:29 )             29.6     12-30    132<L>  |  100  |  11  ----------------------------<  96  4.1   |  26  |  0.80    Ca    8.1<L>      30 Dec 2022 05:29  Phos  2.6     12-30  Mg     2.0     12-30    TPro  5.7<L>  /  Alb  2.8<L>  /  TBili  1.9<H>  /  DBili  x   /  AST  27  /  ALT  25  /  AlkPhos  105  12-30    PT/INR - ( 30 Dec 2022 05:29 )   PT: 15.8 sec;   INR: 1.32          PTT - ( 30 Dec 2022 05:29 )  PTT:68.6 sec      RADIOLOGY & ADDITIONAL STUDIES:

## 2022-12-30 NOTE — PROGRESS NOTE ADULT - ASSESSMENT
76yo M with no significant PMH/PSx admitted to cardiology service on 12/7 in the setting of severely reduced LV function 2/2 an LV thrombus. Pt now with several days of post-prandial abdominal pain and loose BMs, initially suspected to be 2/2 opioid withdrawal, however on evening of 12/10 had a large bloody BM. 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 Vascular surgery (already following) with plan for OR. General surgery consulted for possible operative assistance in the setting of bowel ischemia. Now POD2 s/p ex lap, SMA embolectomy and small bowel resection. Transferred to CCU on 12/21/22 for cardiac optimization and now transferred back to SICU 12/22/22 for bleeding hemodynamic instability. CTA performed demonstrating large hematoma in R abdomen, new hemoperitoneum, and bilateral pleural effusions.     Plan:    -Plan for cardioversion by Cardiology on 12/30  -Monitor ileostomy output  -Continue Imodium  -Heparin gtt  -Appreciate vascular surgery recs  -Appreciate cardiology recs  -NPO for procedure today  -IVF  -Rest of care per SICU    -Surgery Team 4C will continue to follow. Please page Team 4 with questions/clinical changes. [phone]   78yo M with no significant PMH/PSx admitted to cardiology service on 12/7 in the setting of severely reduced LV function 2/2 an LV thrombus. Pt now with several days of post-prandial abdominal pain and loose BMs, initially suspected to be 2/2 opioid withdrawal, however on evening of 12/10 had a large bloody BM. 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 Vascular surgery (already following) with plan for OR. General surgery consulted for possible operative assistance in the setting of bowel ischemia. Now POD2 s/p ex lap, SMA embolectomy and small bowel resection. Transferred to CCU on 12/21/22 for cardiac optimization and now transferred back to SICU 12/22/22 for bleeding hemodynamic instability. CTA performed demonstrating large hematoma in R abdomen, new hemoperitoneum, and bilateral pleural effusions.     Plan:    -Plan for cardioversion by Cardiology on 12/30  -Monitor ileostomy output  -Remove staples  -Continue Imodium  -Heparin gtt  -Appreciate vascular surgery recs  -Appreciate cardiology recs  -NPO for procedure today  -IVF  -Rest of care per SICU    -Surgery Team 4C will continue to follow. Please page Team 4 with questions/clinical changes. [phone]

## 2022-12-30 NOTE — PROGRESS NOTE ADULT - PROBLEM SELECTOR PLAN 4
Hospital course complicated by acute GI bleed, requiring intermittent pressor support as well as multiple transfusions  -now with stable H/H while on heparin gtt. s/p 1u pRBC on 12/27, with hgb improving to 9-10 and remains stable while on AC; continue to monitor

## 2022-12-30 NOTE — PROGRESS NOTE ADULT - SUBJECTIVE AND OBJECTIVE BOX
OVERNIGHT EVENTS: LORENZO negative for thrombus    SUBJECTIVE:  Patient seen and examined at bedside. Denies pain, states he feels well.    Vital Signs Last 12 Hrs  T(F): 98.6 (12-30-22 @ 13:47), Max: 100.4 (12-30-22 @ 12:00)  HR: 97 (12-30-22 @ 13:47) (88 - 124)  BP: 105/60 (12-30-22 @ 13:00) (103/64 - 129/64)  BP(mean): 76 (12-30-22 @ 13:00) (75 - 92)  RR: 18 (12-30-22 @ 13:47) (11 - 26)  SpO2: 93% (12-30-22 @ 13:47) (93% - 98%)  I&O's Summary    29 Dec 2022 07:01  -  30 Dec 2022 07:00  --------------------------------------------------------  IN: 1970 mL / OUT: 3100 mL / NET: -1130 mL    30 Dec 2022 07:01  -  30 Dec 2022 14:48  --------------------------------------------------------  IN: 642 mL / OUT: 350 mL / NET: 292 mL        PHYSICAL EXAM:  Constitutional: NAD, comfortable in bed. speaking in full sentences  HEENT: NC/AT, MMM  Neck: Supple, + JVD  Respiratory: CTA B/L. No w/r/r.   Cardiovascular: RRR, normal S1 and S2, no m/r/g.   Gastrointestinal: +BS, soft NTND, +ostomy  Extremities: RLE warm with no edema. LLE cool and necrotic appearing        LABS:                        9.8    5.03  )-----------( 300      ( 30 Dec 2022 05:29 )             29.6     12-30    132<L>  |  100  |  11  ----------------------------<  96  4.1   |  26  |  0.80    Ca    8.1<L>      30 Dec 2022 05:29  Phos  2.6     12-30  Mg     2.0     12-30    TPro  5.7<L>  /  Alb  2.8<L>  /  TBili  1.9<H>  /  DBili  x   /  AST  27  /  ALT  25  /  AlkPhos  105  12-30    PT/INR - ( 30 Dec 2022 05:29 )   PT: 15.8 sec;   INR: 1.32          PTT - ( 30 Dec 2022 05:29 )  PTT:68.6 sec        RADIOLOGY & ADDITIONAL TESTS:    MEDICATIONS  (STANDING):  aMIOdarone    Tablet 400 milliGRAM(s) Oral every 12 hours  chlorhexidine 2% Cloths 1 Application(s) Topical <User Schedule>  dextrose 5%. 1000 milliLiter(s) (50 mL/Hr) IV Continuous <Continuous>  dextrose 5%. 1000 milliLiter(s) (100 mL/Hr) IV Continuous <Continuous>  dronabinol 5 milliGRAM(s) Oral every 12 hours  glucagon  Injectable 1 milliGRAM(s) IntraMuscular once  heparin  Infusion 1200 Unit(s)/Hr (13 mL/Hr) IV Continuous <Continuous>  influenza  Vaccine (HIGH DOSE) 0.7 milliLiter(s) IntraMuscular once  insulin lispro (ADMELOG) corrective regimen sliding scale   SubCutaneous every 6 hours  loperamide 4 milliGRAM(s) Oral three times a day  metoprolol succinate ER 25 milliGRAM(s) Oral daily  pantoprazole  Injectable 40 milliGRAM(s) IV Push two times a day  phenylephrine    Infusion 0.1 MICROgram(s)/kG/Min (2.42 mL/Hr) IV Continuous <Continuous>  psyllium Powder 1 Packet(s) Oral every 12 hours  silver sulfADIAZINE 1% Cream 1 Application(s) Topical daily    MEDICATIONS  (PRN):  acetaminophen     Tablet .. 650 milliGRAM(s) Oral every 6 hours PRN Temp greater or equal to 38C (100.4F), Mild Pain (1 - 3)  dextrose Oral Gel 15 Gram(s) Oral once PRN Blood Glucose LESS THAN 70 milliGRAM(s)/deciliter

## 2022-12-30 NOTE — PROGRESS NOTE ADULT - ASSESSMENT
77M with PMHx of IVDU found unresponsive at his nursing home, resolved after Narcan in the field and brought to [facility]. Found to have PE, atrial flutter, and large LV thrombus on echo. Transferred to [facility] for further management. Pt c/o abdominal pain on 12/10 CTA showing mid SMA with embolus. Abnormal distal small bowel loops and cecum with dilatation and pneumatosis suggesting infarcted bowel. One or two tiny foci of intrahepatic portal vein pneumatosis. Segmental infarction upper pole left kidney. Now s/p Ex lap, SMA embolectomy, 80cm SBR, abthera vac left in discontinuity (12/11) and transferred to SICU intubated. S/p second look (12/13) and most recently s/p OR for 3rd look, end-to-end anastomosis of remaining bowel, loop ileostomy and abdomen closure (12/15). Remains in SICU, now extubated with still with limb ischemia to LLE pending amputation and AC held given BRBPR and hematoma.    Neuro: Pain: IV Tylenol ATC  Psych: Depressed mood. No longer agitated or suicidal. Dronabinol for appetite.  CV: Septic shock--resolved; AFlutter s/p Amio gtt and now on 125 mcg QD Digoxin: Metop 12.5 BID; TTE (12/7) CHF- Severe RV dysfxn and LVEF 15%, holding Spironolactone and Valsartan. LV thrombus w LLE limb ischemia- Heparin gtt resumed 12/28 (goal PTT 60-90). ASA 81 held. S/p LORENZO: no thrombus in atrial appendage (12/29) and plan for cardio-conversion today (12/30) Pt agreeable for AKA, plan for surgery at some point after cardioconversion.   Pulm: on room air  GI: Reg/High fiber diet + ensures, metamucil BID, appetite improving w/ dorbinol; PPI. Ileostomy high output--increased to imodium 2 TID.  : Voids- Infarction of upper pole of Left Kidney, Renal US negative on 12/12. [place].   ID: Off ABX // Dry gangrene no ABX.  Ischemic bowel: Zosyn (12/11-12/21), LLE wounds Vanc (12-19-21).   Heme: Hep C positive; Active T&S; Hg goal > 8  Endo: mISS, Hypoglycemia Resolved   PPx: SCD. hep gtt, daily PTT's  Lines: PIVs Rt IJ TLC( 12/22-)  // DC: Rt TLC (12/11-12/17), L radial connie (12/11-12/20). R [place] (--12/26)  Dispo: SICU, pending cardio-conversion/surgery

## 2022-12-30 NOTE — PROGRESS NOTE ADULT - PROBLEM SELECTOR PLAN 1
EF 10-15%, LVIDD 5. Severely reduced RV function.   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

## 2022-12-31 NOTE — PROGRESS NOTE ADULT - ASSESSMENT
76yo M with no significant PMH/PSx admitted to cardiology service on 12/7 in the setting of severely reduced LV function 2/2 an LV thrombus. Pt now with several days of post-prandial abdominal pain and loose BMs, initially suspected to be 2/2 opioid withdrawal, however on evening of 12/10 had a large bloody BM. 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 Vascular surgery (already following) with plan for OR. General surgery consulted for possible operative assistance in the setting of bowel ischemia. Now POD2 s/p ex lap, SMA embolectomy and small bowel resection. Transferred to CCU on 12/21/22 for cardiac optimization and now transferred back to SICU 12/22/22 for bleeding hemodynamic instability. CTA performed demonstrating large hematoma in R abdomen, new hemoperitoneum, and bilateral pleural effusions.     Plan:  -Monitor ileostomy output  -Remove staples  -Continue Imodium  -Heparin gtt  -Appreciate vascular surgery recs  -Appreciate cardiology recs  -NPO for procedure today  -IVF  -Rest of care per SICU    -Surgery Team 4C will continue to follow. Please page Team 4 with questions/clinical changes. [phone] 78yo M with no significant PMH/PSx admitted to cardiology service on 12/7 in the setting of severely reduced LV function 2/2 an LV thrombus. Pt now with several days of post-prandial abdominal pain and loose BMs, initially suspected to be 2/2 opioid withdrawal, however on evening of 12/10 had a large bloody BM. 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 Vascular surgery (already following) with plan for OR. General surgery consulted for possible operative assistance in the setting of bowel ischemia. Now POD2 s/p ex lap, SMA embolectomy and small bowel resection. Transferred to CCU on 12/21/22 for cardiac optimization and now transferred back to SICU 12/22/22 for bleeding hemodynamic instability. CTA performed demonstrating large hematoma in R abdomen, new hemoperitoneum, and bilateral pleural effusions.     Plan:  -Monitor ileostomy output  -Remove staples  -Continue Imodium  -Heparin gtt. Titrate PTT per SICU  -Appreciate vascular surgery recs  -Appreciate cardiology recs  -NPO for procedure today  -IVF  -Rest of care per SICU    -Surgery Team 4C will continue to follow. Please page Team 4 with questions/clinical changes. [phone] 78yo M with no significant PMH/PSx admitted to cardiology service on 12/7 in the setting of severely reduced LV function 2/2 an LV thrombus. Pt now with several days of post-prandial abdominal pain and loose BMs, initially suspected to be 2/2 opioid withdrawal, however on evening of 12/10 had a large bloody BM. 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 Vascular surgery (already following) with plan for OR. General surgery consulted for possible operative assistance in the setting of bowel ischemia. Now POD2 s/p ex lap, SMA embolectomy and small bowel resection. Transferred to CCU on 12/21/22 for cardiac optimization and now transferred back to SICU 12/22/22 for bleeding hemodynamic instability. CTA performed demonstrating large hematoma in R abdomen, new hemoperitoneum, and bilateral pleural effusions. Now s/p cardioversion (12/31) and in normal sinus rhythm    Plan:  -Monitor ileostomy output  -Remove staples  -Continue Imodium  -Heparin gtt. Titrate PTT per SICU  -Appreciate vascular surgery recs  -Appreciate cardiology recs  -NPO for procedure today  -IVF  -Rest of care per SICU    -Surgery Team 4C will continue to follow. Please page Team 4 with questions/clinical changes. [phone]

## 2022-12-31 NOTE — PROGRESS NOTE ADULT - SUBJECTIVE AND OBJECTIVE BOX
ON: normal sinus rhythm high ileostomy output, K + Phos repleted.    SUBJECTIVE: Pt seen and examined by SICU team + attending ICU physician. Pt denies N/V/CP/SOB, denies appetite, minimal participation in subjective interview.     MEDICATIONS  (STANDING):  aMIOdarone    Tablet 400 milliGRAM(s) Oral every 12 hours  chlorhexidine 2% Cloths 1 Application(s) Topical <User Schedule>  dextrose 5%. 1000 milliLiter(s) (100 mL/Hr) IV Continuous <Continuous>  dextrose 5%. 1000 milliLiter(s) (50 mL/Hr) IV Continuous <Continuous>  dronabinol 5 milliGRAM(s) Oral every 12 hours  glucagon  Injectable 1 milliGRAM(s) IntraMuscular once  heparin  Infusion 1200 Unit(s)/Hr (13 mL/Hr) IV Continuous <Continuous>  influenza  Vaccine (HIGH DOSE) 0.7 milliLiter(s) IntraMuscular once  insulin lispro (ADMELOG) corrective regimen sliding scale   SubCutaneous every 6 hours  loperamide 4 milliGRAM(s) Oral three times a day  metoprolol succinate ER 25 milliGRAM(s) Oral daily  pantoprazole  Injectable 40 milliGRAM(s) IV Push two times a day  potassium chloride   Solution 40 milliEquivalent(s) Oral once  psyllium Powder 1 Packet(s) Oral every 12 hours  silver sulfADIAZINE 1% Cream 1 Application(s) Topical daily  sodium chloride 0.9% Bolus 1000 milliLiter(s) IV Bolus once    MEDICATIONS  (PRN):  acetaminophen     Tablet .. 650 milliGRAM(s) Oral every 6 hours PRN Temp greater or equal to 38C (100.4F), Mild Pain (1 - 3)  dextrose Oral Gel 15 Gram(s) Oral once PRN Blood Glucose LESS THAN 70 milliGRAM(s)/deciliter      ICU Vital Signs Last 24 Hrs  T(C): 36.4 (31 Dec 2022 05:06), Max: 38 (30 Dec 2022 12:00)  T(F): 97.6 (31 Dec 2022 05:06), Max: 100.4 (30 Dec 2022 12:00)  HR: 91 (31 Dec 2022 08:00) (86 - 125)  BP: 115/67 (31 Dec 2022 08:00) (96/60 - 131/67)  BP(mean): 85 (31 Dec 2022 08:00) (72 - 93)  ABP: --  ABP(mean): --  RR: 17 (31 Dec 2022 08:00) (9 - 26)  SpO2: 95% (31 Dec 2022 08:00) (85% - 100%)    O2 Parameters below as of 31 Dec 2022 08:00  Patient On (Oxygen Delivery Method): room air      Physical Exam:  General: NAD, flat affect   HEENT: NC/AT, EOMI, PERRLA, normal hearing,   Pulmonary: Nonlabored breathing, no respiratory distress,   Cardiovascular: NSR,  Abdominal: soft, NT/ND, midline lap site c/d/i, ostomy PPP w thin feculent output in stoma appliance   Extremities: dry gangrene LLE from knee down  Neuro: A/O x3, CNs II-XII grossly intact, absent sensation motor function in LLE    I&O's Summary    30 Dec 2022 07:01  -  31 Dec 2022 07:00  --------------------------------------------------------  IN: 1310 mL / OUT: 4000 mL / NET: -2690 mL        LABS:                        9.7    7.28  )-----------( 303      ( 31 Dec 2022 05:39 )             29.8     12-31    132<L>  |  101  |  12  ----------------------------<  101<H>  3.6   |  25  |  0.80    Ca    8.1<L>      31 Dec 2022 05:39  Phos  2.4     12-31  Mg     1.9     12-31    TPro  5.7<L>  /  Alb  2.8<L>  /  TBili  1.9<H>  /  DBili  x   /  AST  27  /  ALT  25  /  AlkPhos  105  12-30    PT/INR - ( 30 Dec 2022 05:29 )   PT: 15.8 sec;   INR: 1.32          PTT - ( 31 Dec 2022 05:39 )  PTT:65.4 sec    CAPILLARY BLOOD GLUCOSE      POCT Blood Glucose.: 116 mg/dL (31 Dec 2022 06:23)  POCT Blood Glucose.: 96 mg/dL (30 Dec 2022 23:56)  POCT Blood Glucose.: 97 mg/dL (30 Dec 2022 17:40)  POCT Blood Glucose.: 154 mg/dL (30 Dec 2022 16:40)  POCT Blood Glucose.: 110 mg/dL (30 Dec 2022 11:59)    LIVER FUNCTIONS - ( 30 Dec 2022 05:29 )  Alb: 2.8 g/dL / Pro: 5.7 g/dL / ALK PHOS: 105 U/L / ALT: 25 U/L / AST: 27 U/L / GGT: x             Cultures:    RADIOLOGY & ADDITIONAL STUDIES:     ON: normal sinus rhythm high ileostomy output, K + Phos repleted.    SUBJECTIVE: Pt seen and examined by SICU team + attending ICU physician. Pt denies N/V/CP/SOB, denies appetite, minimal participation in subjective interview.     MEDICATIONS  (STANDING):  aMIOdarone    Tablet 400 milliGRAM(s) Oral every 12 hours  chlorhexidine 2% Cloths 1 Application(s) Topical <User Schedule>  dextrose 5%. 1000 milliLiter(s) (100 mL/Hr) IV Continuous <Continuous>  dextrose 5%. 1000 milliLiter(s) (50 mL/Hr) IV Continuous <Continuous>  dronabinol 5 milliGRAM(s) Oral every 12 hours  glucagon  Injectable 1 milliGRAM(s) IntraMuscular once  heparin  Infusion 1200 Unit(s)/Hr (13 mL/Hr) IV Continuous <Continuous>  influenza  Vaccine (HIGH DOSE) 0.7 milliLiter(s) IntraMuscular once  insulin lispro (ADMELOG) corrective regimen sliding scale   SubCutaneous every 6 hours  loperamide 4 milliGRAM(s) Oral three times a day  metoprolol succinate ER 25 milliGRAM(s) Oral daily  pantoprazole  Injectable 40 milliGRAM(s) IV Push two times a day  potassium chloride   Solution 40 milliEquivalent(s) Oral once  psyllium Powder 1 Packet(s) Oral every 12 hours  silver sulfADIAZINE 1% Cream 1 Application(s) Topical daily  sodium chloride 0.9% Bolus 1000 milliLiter(s) IV Bolus once    MEDICATIONS  (PRN):  acetaminophen     Tablet .. 650 milliGRAM(s) Oral every 6 hours PRN Temp greater or equal to 38C (100.4F), Mild Pain (1 - 3)  dextrose Oral Gel 15 Gram(s) Oral once PRN Blood Glucose LESS THAN 70 milliGRAM(s)/deciliter      ICU Vital Signs Last 24 Hrs  T(C): 36.4 (31 Dec 2022 05:06), Max: 38 (30 Dec 2022 12:00)  T(F): 97.6 (31 Dec 2022 05:06), Max: 100.4 (30 Dec 2022 12:00)  HR: 91 (31 Dec 2022 08:00) (86 - 125)  BP: 115/67 (31 Dec 2022 08:00) (96/60 - 131/67)  BP(mean): 85 (31 Dec 2022 08:00) (72 - 93)  ABP: --  ABP(mean): --  RR: 17 (31 Dec 2022 08:00) (9 - 26)  SpO2: 95% (31 Dec 2022 08:00) (85% - 100%)    O2 Parameters below as of 31 Dec 2022 08:00  Patient On (Oxygen Delivery Method): room air      Physical Exam:  General: NAD, flat affect   HEENT: NC/AT, EOMI, PERRLA, normal hearing,   Pulmonary: Nonlabored breathing, no respiratory distress,   Cardiovascular: NSR,  Abdominal: soft, NT/ND, midline lap site c/d/i, ostomy PPP w thin feculent output in stoma appliance   Extremities: dry gangrene LLE from knee down  Neuro: A/O x3, CNs II-XII grossly intact, absent sensation motor function in LLE  Pulses: R PT, absent L PT/DP    I&O's Summary    30 Dec 2022 07:01  -  31 Dec 2022 07:00  --------------------------------------------------------  IN: 1310 mL / OUT: 4000 mL / NET: -2690 mL        LABS:                        9.7    7.28  )-----------( 303      ( 31 Dec 2022 05:39 )             29.8     12-31    132<L>  |  101  |  12  ----------------------------<  101<H>  3.6   |  25  |  0.80    Ca    8.1<L>      31 Dec 2022 05:39  Phos  2.4     12-31  Mg     1.9     12-31    TPro  5.7<L>  /  Alb  2.8<L>  /  TBili  1.9<H>  /  DBili  x   /  AST  27  /  ALT  25  /  AlkPhos  105  12-30    PT/INR - ( 30 Dec 2022 05:29 )   PT: 15.8 sec;   INR: 1.32          PTT - ( 31 Dec 2022 05:39 )  PTT:65.4 sec    CAPILLARY BLOOD GLUCOSE      POCT Blood Glucose.: 116 mg/dL (31 Dec 2022 06:23)  POCT Blood Glucose.: 96 mg/dL (30 Dec 2022 23:56)  POCT Blood Glucose.: 97 mg/dL (30 Dec 2022 17:40)  POCT Blood Glucose.: 154 mg/dL (30 Dec 2022 16:40)  POCT Blood Glucose.: 110 mg/dL (30 Dec 2022 11:59)    LIVER FUNCTIONS - ( 30 Dec 2022 05:29 )  Alb: 2.8 g/dL / Pro: 5.7 g/dL / ALK PHOS: 105 U/L / ALT: 25 U/L / AST: 27 U/L / GGT: x             Cultures:    RADIOLOGY & ADDITIONAL STUDIES:

## 2022-12-31 NOTE — PROGRESS NOTE ADULT - SUBJECTIVE AND OBJECTIVE BOX
General Surgery Progress Note    SUBJECTIVE:   Patient seen and examined at bedside by chief during AM rounds.    Vital Signs Last 24 Hrs  T(C): 36.4 (31 Dec 2022 05:06), Max: 38 (30 Dec 2022 12:00)  T(F): 97.6 (31 Dec 2022 05:06), Max: 100.4 (30 Dec 2022 12:00)  HR: 99 (31 Dec 2022 05:00) (86 - 125)  BP: 117/60 (31 Dec 2022 05:00) (96/60 - 131/67)  BP(mean): 82 (31 Dec 2022 05:00) (72 - 93)  RR: 16 (31 Dec 2022 05:00) (9 - 26)  SpO2: 93% (31 Dec 2022 05:00) (85% - 100%)    Parameters below as of 31 Dec 2022 05:00  Patient On (Oxygen Delivery Method): room air        I&O's Summary    29 Dec 2022 07:01  -  30 Dec 2022 07:00  --------------------------------------------------------  IN: 1970 mL / OUT: 3100 mL / NET: -1130 mL    30 Dec 2022 07:01  -  31 Dec 2022 06:27  --------------------------------------------------------  IN: 1297 mL / OUT: 3525 mL / NET: -2228 mL        Physical Exam:  General: Resting comfortably in bed, NAD  HEENT: ATNC  Pulmonary: Nonlabored breathing, no respiratory distress, no acessory muscle use noted  Cardiovascular: NSR  Abdomen: Soft, nondisteded, appropriate incisional tenderness  Extremities: WWP, SCDs in place, No significant edema appreciated    LABS:                        9.7    7.28  )-----------( 303      ( 31 Dec 2022 05:39 )             29.8     12-31    132<L>  |  101  |  12  ----------------------------<  101<H>  3.6   |  25  |  0.80    Ca    8.1<L>      31 Dec 2022 05:39  Phos  2.4     12-31  Mg     1.9     12-31    TPro  5.7<L>  /  Alb  2.8<L>  /  TBili  1.9<H>  /  DBili  x   /  AST  27  /  ALT  25  /  AlkPhos  105  12-30    PT/INR - ( 30 Dec 2022 05:29 )   PT: 15.8 sec;   INR: 1.32          PTT - ( 31 Dec 2022 05:39 )  PTT:65.4 sec   General Surgery Progress Note    SUBJECTIVE:   Patient seen and examined at bedside by chief during AM rounds. Patient now noted to be in sinus rhythm after cardioverison. Patient reports he still has pain this morning. Denies fevers or chills. States he is not nauseous and tolerating PO.    Vital Signs Last 24 Hrs  T(C): 36.4 (31 Dec 2022 05:06), Max: 38 (30 Dec 2022 12:00)  T(F): 97.6 (31 Dec 2022 05:06), Max: 100.4 (30 Dec 2022 12:00)  HR: 99 (31 Dec 2022 05:00) (86 - 125)  BP: 117/60 (31 Dec 2022 05:00) (96/60 - 131/67)  BP(mean): 82 (31 Dec 2022 05:00) (72 - 93)  RR: 16 (31 Dec 2022 05:00) (9 - 26)  SpO2: 93% (31 Dec 2022 05:00) (85% - 100%)    Parameters below as of 31 Dec 2022 05:00  Patient On (Oxygen Delivery Method): room air        I&O's Summary    29 Dec 2022 07:01  -  30 Dec 2022 07:00  --------------------------------------------------------  IN: 1970 mL / OUT: 3100 mL / NET: -1130 mL    30 Dec 2022 07:01  -  31 Dec 2022 06:27  --------------------------------------------------------  IN: 1297 mL / OUT: 3525 mL / NET: -2228 mL        Physical Exam:  General: Resting comfortably in bed, NAD  HEENT: ATNC  Pulmonary: Nonlabored breathing, no respiratory distress, no accessory muscle use noted  Cardiovascular: NSR  Abdomen: Soft, nondistended, nontender. Midline incision noted with staples. Ostomy noted in RLQ with liquid stool and gas - pink and patent  Extremities: WWP, SCDs in place, No significant edema appreciated    LABS:                        9.7    7.28  )-----------( 303      ( 31 Dec 2022 05:39 )             29.8     12-31    132<L>  |  101  |  12  ----------------------------<  101<H>  3.6   |  25  |  0.80    Ca    8.1<L>      31 Dec 2022 05:39  Phos  2.4     12-31  Mg     1.9     12-31    TPro  5.7<L>  /  Alb  2.8<L>  /  TBili  1.9<H>  /  DBili  x   /  AST  27  /  ALT  25  /  AlkPhos  105  12-30    PT/INR - ( 30 Dec 2022 05:29 )   PT: 15.8 sec;   INR: 1.32          PTT - ( 31 Dec 2022 05:39 )  PTT:65.4 sec

## 2022-12-31 NOTE — PROGRESS NOTE ADULT - ASSESSMENT
77M with PMHx of IVDU found unresponsive at his nursing home, resolved after Narcan. Now s/p Ex lap, SMA embolectomy, 80cm SBR, abthera vac left in discontinuity (12/11) and transferred to SICU intubated. S/p second look (12/13) and most recently s/p OR for 3rd look, end-to-end anastomosis of remaining bowel, loop ileostomy and abdomen closure (12/15). Remains in SICU, now extubated with still with limb ischemia to LLE pending amputation and AC held given BRBPR and hematoma.    Neuro: V Tylenol ATC. Dronabinol for appetite.  CV:  AFlutter: S/p Cardioverion on 12/30 Cont Amio 400 PO BID X 5 Days and Digoxin: Toprol XL 25 QD;  LV thrombus w LLE limb ischemia- Heparin gtt resumed 12/28 (goal PTT 60-90). ASA 81 held.  Pulm: on room air  GI: Reg/High fiber diet + ensures, metamucil BID, Lomotil 4mg TID, metamucil BID, will discuss adding tincture of opium, will replete .5/1 ostomy output this AM  : cont bowen  ID: Off ABX // Dry gangrene no ABX.   Heme: Hep C positive; Active T&S; Hg goal > 8  Endo: mISS,   PPx: SCD. hep gtt, daily PTT's  Lines: PIVs, will remove connie   Dispo: SICU, pending AKA

## 2023-01-01 ENCOUNTER — TRANSCRIPTION ENCOUNTER (OUTPATIENT)
Age: 78
End: 2023-01-01

## 2023-01-01 ENCOUNTER — RESULT REVIEW (OUTPATIENT)
Age: 78
End: 2023-01-01

## 2023-01-01 DIAGNOSIS — R45.89 OTHER SYMPTOMS AND SIGNS INVOLVING EMOTIONAL STATE: ICD-10-CM

## 2023-01-01 DIAGNOSIS — A41.9 SEPSIS, UNSPECIFIED ORGANISM: ICD-10-CM

## 2023-01-01 DIAGNOSIS — K94.19 OTHER COMPLICATIONS OF ENTEROSTOMY: ICD-10-CM

## 2023-01-01 DIAGNOSIS — R53.2 FUNCTIONAL QUADRIPLEGIA: ICD-10-CM

## 2023-01-01 DIAGNOSIS — R50.9 FEVER, UNSPECIFIED: ICD-10-CM

## 2023-01-01 DIAGNOSIS — I70.262 ATHEROSCLEROSIS OF NATIVE ARTERIES OF EXTREMITIES WITH GANGRENE, LEFT LEG: ICD-10-CM

## 2023-01-01 LAB
-  AMOXICILLIN/CLAVULANIC ACID: SIGNIFICANT CHANGE UP
-  AMOXICILLIN/CLAVULANIC ACID: SIGNIFICANT CHANGE UP
-  AMPICILLIN/SULBACTAM: SIGNIFICANT CHANGE UP
-  AMPICILLIN: SIGNIFICANT CHANGE UP
-  BACTEROIDES FRAGILIS: SIGNIFICANT CHANGE UP
-  CANDIDA ALBICANS: SIGNIFICANT CHANGE UP
-  CANDIDA GLABRATA: SIGNIFICANT CHANGE UP
-  CANDIDA KRUSEI: SIGNIFICANT CHANGE UP
-  CANDIDA PARAPSILOSIS: SIGNIFICANT CHANGE UP
-  CANDIDA TROPICALIS: SIGNIFICANT CHANGE UP
-  CEFAZOLIN: SIGNIFICANT CHANGE UP
-  CEFTRIAXONE: SIGNIFICANT CHANGE UP
-  CIPROFLOXACIN: SIGNIFICANT CHANGE UP
-  CLINDAMYCIN: SIGNIFICANT CHANGE UP
-  COAGULASE NEGATIVE STAPHYLOCOCCUS, METHICILLIN RESISTANT: SIGNIFICANT CHANGE UP
-  COAGULASE NEGATIVE STAPHYLOCOCCUS: SIGNIFICANT CHANGE UP
-  CTX-M RESISTANCE MARKER: SIGNIFICANT CHANGE UP
-  DAPTOMYCIN: SIGNIFICANT CHANGE UP
-  ENTEROBACTERALES: SIGNIFICANT CHANGE UP
-  ERTAPENEM: SIGNIFICANT CHANGE UP
-  GENTAMICIN SYNERGY: SIGNIFICANT CHANGE UP
-  GENTAMICIN: SIGNIFICANT CHANGE UP
-  K. PNEUMONIAE GROUP: SIGNIFICANT CHANGE UP
-  K. PNEUMONIAE GROUP: SIGNIFICANT CHANGE UP
-  LINEZOLID: SIGNIFICANT CHANGE UP
-  MEROPENEM: SIGNIFICANT CHANGE UP
-  METRONIDAZOLE: SIGNIFICANT CHANGE UP
-  METRONIDAZOLE: SIGNIFICANT CHANGE UP
-  PIPERACILLIN/TAZOBACTAM: SIGNIFICANT CHANGE UP
-  STAPHYLOCOCCUS EPIDERMIDIS, METHICILLIN RESISTANT: SIGNIFICANT CHANGE UP
-  STAPHYLOCOCCUS EPIDERMIDIS: SIGNIFICANT CHANGE UP
-  STAPHYLOCOCCUS LUGDUNENSIS, METHICILLIN RESISTANT: SIGNIFICANT CHANGE UP
-  STENOTROPHOMONAS MALTOPHILIA: SIGNIFICANT CHANGE UP
-  STREPTOCOCCUS SP. (NOT GRP A, B OR S PNEUMONIAE): SIGNIFICANT CHANGE UP
-  STREPTOMYCIN SYNERGY: SIGNIFICANT CHANGE UP
-  TOBRAMYCIN: SIGNIFICANT CHANGE UP
-  TRIMETHOPRIM/SULFAMETHOXAZOLE: SIGNIFICANT CHANGE UP
-  VANCOMYCIN: SIGNIFICANT CHANGE UP
-  VANCOMYCIN: SIGNIFICANT CHANGE UP
A BAUMANNII DNA SPEC QL NAA+PROBE: SIGNIFICANT CHANGE UP
ALBUMIN SERPL ELPH-MCNC: 1.6 G/DL — LOW (ref 3.3–5)
ALBUMIN SERPL ELPH-MCNC: 1.8 G/DL — LOW (ref 3.3–5)
ALBUMIN SERPL ELPH-MCNC: 1.9 G/DL — LOW (ref 3.3–5)
ALBUMIN SERPL ELPH-MCNC: 2 G/DL — LOW (ref 3.3–5)
ALBUMIN SERPL ELPH-MCNC: 2.1 G/DL — LOW (ref 3.3–5)
ALBUMIN SERPL ELPH-MCNC: 2.2 G/DL — LOW (ref 3.3–5)
ALBUMIN SERPL ELPH-MCNC: 2.2 G/DL — LOW (ref 3.3–5)
ALBUMIN SERPL ELPH-MCNC: 2.4 G/DL — LOW (ref 3.3–5)
ALBUMIN SERPL ELPH-MCNC: 2.5 G/DL — LOW (ref 3.3–5)
ALBUMIN SERPL ELPH-MCNC: 2.5 G/DL — LOW (ref 3.3–5)
ALBUMIN SERPL ELPH-MCNC: 2.6 G/DL — LOW (ref 3.3–5)
ALBUMIN SERPL ELPH-MCNC: 2.6 G/DL — LOW (ref 3.3–5)
ALBUMIN SERPL ELPH-MCNC: 2.7 G/DL — LOW (ref 3.3–5)
ALBUMIN SERPL ELPH-MCNC: 2.9 G/DL — LOW (ref 3.3–5)
ALBUMIN SERPL ELPH-MCNC: 3 G/DL — LOW (ref 3.3–5)
ALBUMIN SERPL ELPH-MCNC: 3 G/DL — LOW (ref 3.3–5)
ALBUMIN SERPL ELPH-MCNC: 3.2 G/DL — LOW (ref 3.3–5)
ALBUMIN SERPL ELPH-MCNC: 3.3 G/DL — SIGNIFICANT CHANGE UP (ref 3.3–5)
ALP SERPL-CCNC: 117 U/L — SIGNIFICANT CHANGE UP (ref 40–120)
ALP SERPL-CCNC: 130 U/L — HIGH (ref 40–120)
ALP SERPL-CCNC: 140 U/L — HIGH (ref 40–120)
ALP SERPL-CCNC: 140 U/L — HIGH (ref 40–120)
ALP SERPL-CCNC: 146 U/L — HIGH (ref 40–120)
ALP SERPL-CCNC: 149 U/L — HIGH (ref 40–120)
ALP SERPL-CCNC: 150 U/L — HIGH (ref 40–120)
ALP SERPL-CCNC: 154 U/L — HIGH (ref 40–120)
ALP SERPL-CCNC: 160 U/L — HIGH (ref 40–120)
ALP SERPL-CCNC: 161 U/L — HIGH (ref 40–120)
ALP SERPL-CCNC: 167 U/L — HIGH (ref 40–120)
ALP SERPL-CCNC: 168 U/L — HIGH (ref 40–120)
ALP SERPL-CCNC: 174 U/L — HIGH (ref 40–120)
ALP SERPL-CCNC: 175 U/L — HIGH (ref 40–120)
ALP SERPL-CCNC: 178 U/L — HIGH (ref 40–120)
ALP SERPL-CCNC: 187 U/L — HIGH (ref 40–120)
ALP SERPL-CCNC: 187 U/L — HIGH (ref 40–120)
ALP SERPL-CCNC: 213 U/L — HIGH (ref 40–120)
ALP SERPL-CCNC: 222 U/L — HIGH (ref 40–120)
ALP SERPL-CCNC: 239 U/L — HIGH (ref 40–120)
ALP SERPL-CCNC: 91 U/L — SIGNIFICANT CHANGE UP (ref 40–120)
ALT FLD-CCNC: 10 U/L — SIGNIFICANT CHANGE UP (ref 10–45)
ALT FLD-CCNC: 112 U/L — HIGH (ref 10–45)
ALT FLD-CCNC: 17 U/L — SIGNIFICANT CHANGE UP (ref 10–45)
ALT FLD-CCNC: 19 U/L — SIGNIFICANT CHANGE UP (ref 10–45)
ALT FLD-CCNC: 20 U/L — SIGNIFICANT CHANGE UP (ref 10–45)
ALT FLD-CCNC: 20 U/L — SIGNIFICANT CHANGE UP (ref 10–45)
ALT FLD-CCNC: 25 U/L — SIGNIFICANT CHANGE UP (ref 10–45)
ALT FLD-CCNC: 26 U/L — SIGNIFICANT CHANGE UP (ref 10–45)
ALT FLD-CCNC: 27 U/L — SIGNIFICANT CHANGE UP (ref 10–45)
ALT FLD-CCNC: 32 U/L — SIGNIFICANT CHANGE UP (ref 10–45)
ALT FLD-CCNC: 36 U/L — SIGNIFICANT CHANGE UP (ref 10–45)
ALT FLD-CCNC: 42 U/L — SIGNIFICANT CHANGE UP (ref 10–45)
ALT FLD-CCNC: 48 U/L — HIGH (ref 10–45)
ALT FLD-CCNC: 56 U/L — HIGH (ref 10–45)
ALT FLD-CCNC: 58 U/L — HIGH (ref 10–45)
ALT FLD-CCNC: 59 U/L — HIGH (ref 10–45)
ALT FLD-CCNC: 62 U/L — HIGH (ref 10–45)
ALT FLD-CCNC: 65 U/L — HIGH (ref 10–45)
ALT FLD-CCNC: 65 U/L — HIGH (ref 10–45)
ALT FLD-CCNC: 68 U/L — HIGH (ref 10–45)
ALT FLD-CCNC: 75 U/L — HIGH (ref 10–45)
ALT FLD-CCNC: 86 U/L — HIGH (ref 10–45)
ALT FLD-CCNC: 9 U/L — LOW (ref 10–45)
AMPHET UR-MCNC: NEGATIVE — SIGNIFICANT CHANGE UP
ANION GAP SERPL CALC-SCNC: 10 MMOL/L — SIGNIFICANT CHANGE UP (ref 5–17)
ANION GAP SERPL CALC-SCNC: 11 MMOL/L — SIGNIFICANT CHANGE UP (ref 5–17)
ANION GAP SERPL CALC-SCNC: 13 MMOL/L — SIGNIFICANT CHANGE UP (ref 5–17)
ANION GAP SERPL CALC-SCNC: 14 MMOL/L — SIGNIFICANT CHANGE UP (ref 5–17)
ANION GAP SERPL CALC-SCNC: 3 MMOL/L — LOW (ref 5–17)
ANION GAP SERPL CALC-SCNC: 5 MMOL/L — SIGNIFICANT CHANGE UP (ref 5–17)
ANION GAP SERPL CALC-SCNC: 5 MMOL/L — SIGNIFICANT CHANGE UP (ref 5–17)
ANION GAP SERPL CALC-SCNC: 6 MMOL/L — SIGNIFICANT CHANGE UP (ref 5–17)
ANION GAP SERPL CALC-SCNC: 7 MMOL/L — SIGNIFICANT CHANGE UP (ref 5–17)
ANION GAP SERPL CALC-SCNC: 8 MMOL/L — SIGNIFICANT CHANGE UP (ref 5–17)
ANION GAP SERPL CALC-SCNC: 9 MMOL/L — SIGNIFICANT CHANGE UP (ref 5–17)
ANISOCYTOSIS BLD QL: SLIGHT — SIGNIFICANT CHANGE UP
APPEARANCE UR: ABNORMAL
APPEARANCE UR: CLEAR — SIGNIFICANT CHANGE UP
APTT BLD: 105.6 SEC — HIGH (ref 27.5–35.5)
APTT BLD: 108.3 SEC — HIGH (ref 27.5–35.5)
APTT BLD: 26.7 SEC — LOW (ref 27.5–35.5)
APTT BLD: 29.9 SEC — SIGNIFICANT CHANGE UP (ref 27.5–35.5)
APTT BLD: 30.5 SEC — SIGNIFICANT CHANGE UP (ref 27.5–35.5)
APTT BLD: 31.5 SEC — SIGNIFICANT CHANGE UP (ref 27.5–35.5)
APTT BLD: 32.1 SEC — SIGNIFICANT CHANGE UP (ref 27.5–35.5)
APTT BLD: 32.1 SEC — SIGNIFICANT CHANGE UP (ref 27.5–35.5)
APTT BLD: 32.9 SEC — SIGNIFICANT CHANGE UP (ref 27.5–35.5)
APTT BLD: 33.2 SEC — SIGNIFICANT CHANGE UP (ref 27.5–35.5)
APTT BLD: 33.4 SEC — SIGNIFICANT CHANGE UP (ref 27.5–35.5)
APTT BLD: 33.4 SEC — SIGNIFICANT CHANGE UP (ref 27.5–35.5)
APTT BLD: 33.7 SEC — SIGNIFICANT CHANGE UP (ref 27.5–35.5)
APTT BLD: 33.8 SEC — SIGNIFICANT CHANGE UP (ref 27.5–35.5)
APTT BLD: 34.2 SEC — SIGNIFICANT CHANGE UP (ref 27.5–35.5)
APTT BLD: 34.7 SEC — SIGNIFICANT CHANGE UP (ref 27.5–35.5)
APTT BLD: 36.4 SEC — HIGH (ref 27.5–35.5)
APTT BLD: 37 SEC — HIGH (ref 27.5–35.5)
APTT BLD: 38.8 SEC — HIGH (ref 27.5–35.5)
APTT BLD: 40.5 SEC — HIGH (ref 27.5–35.5)
APTT BLD: 40.9 SEC — HIGH (ref 27.5–35.5)
APTT BLD: 41 SEC — HIGH (ref 27.5–35.5)
APTT BLD: 43.8 SEC — HIGH (ref 27.5–35.5)
APTT BLD: 45.1 SEC — HIGH (ref 27.5–35.5)
APTT BLD: 45.2 SEC — HIGH (ref 27.5–35.5)
APTT BLD: 48 SEC — HIGH (ref 27.5–35.5)
APTT BLD: 49.8 SEC — HIGH (ref 27.5–35.5)
APTT BLD: 52 SEC — HIGH (ref 27.5–35.5)
APTT BLD: 53.9 SEC — HIGH (ref 27.5–35.5)
APTT BLD: 55.4 SEC — HIGH (ref 27.5–35.5)
APTT BLD: 55.8 SEC — HIGH (ref 27.5–35.5)
APTT BLD: 56.3 SEC — HIGH (ref 27.5–35.5)
APTT BLD: 57.6 SEC — HIGH (ref 27.5–35.5)
APTT BLD: 58.1 SEC — HIGH (ref 27.5–35.5)
APTT BLD: 58.2 SEC — HIGH (ref 27.5–35.5)
APTT BLD: 58.9 SEC — HIGH (ref 27.5–35.5)
APTT BLD: 59.3 SEC — HIGH (ref 27.5–35.5)
APTT BLD: 59.9 SEC — HIGH (ref 27.5–35.5)
APTT BLD: 61.3 SEC — HIGH (ref 27.5–35.5)
APTT BLD: 64.5 SEC — HIGH (ref 27.5–35.5)
APTT BLD: 66.3 SEC — HIGH (ref 27.5–35.5)
APTT BLD: 66.4 SEC — HIGH (ref 27.5–35.5)
APTT BLD: 66.9 SEC — HIGH (ref 27.5–35.5)
APTT BLD: 67.6 SEC — HIGH (ref 27.5–35.5)
APTT BLD: 70.6 SEC — HIGH (ref 27.5–35.5)
APTT BLD: 72.2 SEC — HIGH (ref 27.5–35.5)
APTT BLD: 74 SEC — HIGH (ref 27.5–35.5)
APTT BLD: 75.1 SEC — HIGH (ref 27.5–35.5)
APTT BLD: 75.9 SEC — HIGH (ref 27.5–35.5)
APTT BLD: 76.7 SEC — HIGH (ref 27.5–35.5)
APTT BLD: 80.1 SEC — HIGH (ref 27.5–35.5)
APTT BLD: 80.8 SEC — HIGH (ref 27.5–35.5)
APTT BLD: 81.3 SEC — HIGH (ref 27.5–35.5)
APTT BLD: 85.3 SEC — HIGH (ref 27.5–35.5)
APTT BLD: 86.2 SEC — HIGH (ref 27.5–35.5)
APTT BLD: 99.5 SEC — HIGH (ref 27.5–35.5)
AST SERPL-CCNC: 14 U/L — SIGNIFICANT CHANGE UP (ref 10–40)
AST SERPL-CCNC: 14 U/L — SIGNIFICANT CHANGE UP (ref 10–40)
AST SERPL-CCNC: 15 U/L — SIGNIFICANT CHANGE UP (ref 10–40)
AST SERPL-CCNC: 15 U/L — SIGNIFICANT CHANGE UP (ref 10–40)
AST SERPL-CCNC: 16 U/L — SIGNIFICANT CHANGE UP (ref 10–40)
AST SERPL-CCNC: 17 U/L — SIGNIFICANT CHANGE UP (ref 10–40)
AST SERPL-CCNC: 20 U/L — SIGNIFICANT CHANGE UP (ref 10–40)
AST SERPL-CCNC: 22 U/L — SIGNIFICANT CHANGE UP (ref 10–40)
AST SERPL-CCNC: 22 U/L — SIGNIFICANT CHANGE UP (ref 10–40)
AST SERPL-CCNC: 29 U/L — SIGNIFICANT CHANGE UP (ref 10–40)
AST SERPL-CCNC: 31 U/L — SIGNIFICANT CHANGE UP (ref 10–40)
AST SERPL-CCNC: 33 U/L — SIGNIFICANT CHANGE UP (ref 10–40)
AST SERPL-CCNC: 35 U/L — SIGNIFICANT CHANGE UP (ref 10–40)
AST SERPL-CCNC: 38 U/L — SIGNIFICANT CHANGE UP (ref 10–40)
AST SERPL-CCNC: 41 U/L — HIGH (ref 10–40)
AST SERPL-CCNC: 41 U/L — HIGH (ref 10–40)
AST SERPL-CCNC: 42 U/L — HIGH (ref 10–40)
AST SERPL-CCNC: 44 U/L — HIGH (ref 10–40)
AST SERPL-CCNC: 47 U/L — HIGH (ref 10–40)
AST SERPL-CCNC: 50 U/L — HIGH (ref 10–40)
AST SERPL-CCNC: 69 U/L — HIGH (ref 10–40)
BACTERIA # UR AUTO: ABNORMAL /HPF
BACTERIA # UR AUTO: ABNORMAL /HPF
BACTERIA # UR AUTO: PRESENT /HPF
BACTERIA # UR AUTO: SIGNIFICANT CHANGE UP /HPF
BACTERIA # UR AUTO: SIGNIFICANT CHANGE UP /HPF
BARBITURATES UR SCN-MCNC: NEGATIVE — SIGNIFICANT CHANGE UP
BASE EXCESS BLDA CALC-SCNC: 1 MMOL/L — SIGNIFICANT CHANGE UP (ref -2–3)
BASOPHILS # BLD AUTO: 0 K/UL — SIGNIFICANT CHANGE UP (ref 0–0.2)
BASOPHILS # BLD AUTO: 0 K/UL — SIGNIFICANT CHANGE UP (ref 0–0.2)
BASOPHILS # BLD AUTO: 0.03 K/UL — SIGNIFICANT CHANGE UP (ref 0–0.2)
BASOPHILS # BLD AUTO: 0.03 K/UL — SIGNIFICANT CHANGE UP (ref 0–0.2)
BASOPHILS # BLD AUTO: 0.04 K/UL — SIGNIFICANT CHANGE UP (ref 0–0.2)
BASOPHILS # BLD AUTO: 0.04 K/UL — SIGNIFICANT CHANGE UP (ref 0–0.2)
BASOPHILS # BLD AUTO: 0.06 K/UL — SIGNIFICANT CHANGE UP (ref 0–0.2)
BASOPHILS # BLD AUTO: 0.07 K/UL — SIGNIFICANT CHANGE UP (ref 0–0.2)
BASOPHILS # BLD AUTO: 0.07 K/UL — SIGNIFICANT CHANGE UP (ref 0–0.2)
BASOPHILS # BLD AUTO: 0.08 K/UL — SIGNIFICANT CHANGE UP (ref 0–0.2)
BASOPHILS # BLD AUTO: 0.21 K/UL — HIGH (ref 0–0.2)
BASOPHILS NFR BLD AUTO: 0 % — SIGNIFICANT CHANGE UP (ref 0–2)
BASOPHILS NFR BLD AUTO: 0 % — SIGNIFICANT CHANGE UP (ref 0–2)
BASOPHILS NFR BLD AUTO: 0.2 % — SIGNIFICANT CHANGE UP (ref 0–2)
BASOPHILS NFR BLD AUTO: 0.3 % — SIGNIFICANT CHANGE UP (ref 0–2)
BASOPHILS NFR BLD AUTO: 0.3 % — SIGNIFICANT CHANGE UP (ref 0–2)
BASOPHILS NFR BLD AUTO: 0.4 % — SIGNIFICANT CHANGE UP (ref 0–2)
BASOPHILS NFR BLD AUTO: 0.5 % — SIGNIFICANT CHANGE UP (ref 0–2)
BASOPHILS NFR BLD AUTO: 0.6 % — SIGNIFICANT CHANGE UP (ref 0–2)
BASOPHILS NFR BLD AUTO: 0.7 % — SIGNIFICANT CHANGE UP (ref 0–2)
BASOPHILS NFR BLD AUTO: 0.7 % — SIGNIFICANT CHANGE UP (ref 0–2)
BASOPHILS NFR BLD AUTO: 0.9 % — SIGNIFICANT CHANGE UP (ref 0–2)
BENZODIAZ UR-MCNC: NEGATIVE — SIGNIFICANT CHANGE UP
BILIRUB DIRECT SERPL-MCNC: 0.2 MG/DL — SIGNIFICANT CHANGE UP (ref 0–0.3)
BILIRUB DIRECT SERPL-MCNC: 0.6 MG/DL — HIGH (ref 0–0.3)
BILIRUB DIRECT SERPL-MCNC: 0.6 MG/DL — HIGH (ref 0–0.3)
BILIRUB DIRECT SERPL-MCNC: <0.2 MG/DL — SIGNIFICANT CHANGE UP (ref 0–0.3)
BILIRUB DIRECT SERPL-MCNC: <0.2 MG/DL — SIGNIFICANT CHANGE UP (ref 0–0.3)
BILIRUB INDIRECT FLD-MCNC: 0 MG/DL — LOW (ref 0.2–1)
BILIRUB INDIRECT FLD-MCNC: 0.2 MG/DL — SIGNIFICANT CHANGE UP (ref 0.2–1)
BILIRUB INDIRECT FLD-MCNC: 0.3 MG/DL — SIGNIFICANT CHANGE UP (ref 0.2–1)
BILIRUB INDIRECT FLD-MCNC: 0.4 MG/DL — SIGNIFICANT CHANGE UP (ref 0.2–1)
BILIRUB INDIRECT FLD-MCNC: 0.6 MG/DL — SIGNIFICANT CHANGE UP (ref 0.2–1)
BILIRUB INDIRECT FLD-MCNC: 0.7 MG/DL — SIGNIFICANT CHANGE UP (ref 0.2–1)
BILIRUB INDIRECT FLD-MCNC: SIGNIFICANT CHANGE UP MG/DL (ref 0.2–1)
BILIRUB SERPL-MCNC: 0.2 MG/DL — SIGNIFICANT CHANGE UP (ref 0.2–1.2)
BILIRUB SERPL-MCNC: 0.3 MG/DL — SIGNIFICANT CHANGE UP (ref 0.2–1.2)
BILIRUB SERPL-MCNC: 0.4 MG/DL — SIGNIFICANT CHANGE UP (ref 0.2–1.2)
BILIRUB SERPL-MCNC: 0.5 MG/DL — SIGNIFICANT CHANGE UP (ref 0.2–1.2)
BILIRUB SERPL-MCNC: 0.6 MG/DL — SIGNIFICANT CHANGE UP (ref 0.2–1.2)
BILIRUB SERPL-MCNC: 0.6 MG/DL — SIGNIFICANT CHANGE UP (ref 0.2–1.2)
BILIRUB SERPL-MCNC: 0.7 MG/DL — SIGNIFICANT CHANGE UP (ref 0.2–1.2)
BILIRUB SERPL-MCNC: 0.8 MG/DL — SIGNIFICANT CHANGE UP (ref 0.2–1.2)
BILIRUB SERPL-MCNC: 0.9 MG/DL — SIGNIFICANT CHANGE UP (ref 0.2–1.2)
BILIRUB SERPL-MCNC: 1.2 MG/DL — SIGNIFICANT CHANGE UP (ref 0.2–1.2)
BILIRUB SERPL-MCNC: 1.3 MG/DL — HIGH (ref 0.2–1.2)
BILIRUB UR-MCNC: NEGATIVE — SIGNIFICANT CHANGE UP
BLD GP AB SCN SERPL QL: NEGATIVE — SIGNIFICANT CHANGE UP
BUN SERPL-MCNC: 10 MG/DL — SIGNIFICANT CHANGE UP (ref 7–23)
BUN SERPL-MCNC: 11 MG/DL — SIGNIFICANT CHANGE UP (ref 7–23)
BUN SERPL-MCNC: 12 MG/DL — SIGNIFICANT CHANGE UP (ref 7–23)
BUN SERPL-MCNC: 13 MG/DL — SIGNIFICANT CHANGE UP (ref 7–23)
BUN SERPL-MCNC: 14 MG/DL — SIGNIFICANT CHANGE UP (ref 7–23)
BUN SERPL-MCNC: 15 MG/DL — SIGNIFICANT CHANGE UP (ref 7–23)
BUN SERPL-MCNC: 16 MG/DL — SIGNIFICANT CHANGE UP (ref 7–23)
BUN SERPL-MCNC: 17 MG/DL — SIGNIFICANT CHANGE UP (ref 7–23)
BUN SERPL-MCNC: 18 MG/DL — SIGNIFICANT CHANGE UP (ref 7–23)
BUN SERPL-MCNC: 19 MG/DL — SIGNIFICANT CHANGE UP (ref 7–23)
BUN SERPL-MCNC: 19 MG/DL — SIGNIFICANT CHANGE UP (ref 7–23)
BUN SERPL-MCNC: 21 MG/DL — SIGNIFICANT CHANGE UP (ref 7–23)
BUN SERPL-MCNC: 22 MG/DL — SIGNIFICANT CHANGE UP (ref 7–23)
BUN SERPL-MCNC: 22 MG/DL — SIGNIFICANT CHANGE UP (ref 7–23)
BUN SERPL-MCNC: 24 MG/DL — HIGH (ref 7–23)
BUN SERPL-MCNC: 25 MG/DL — HIGH (ref 7–23)
BUN SERPL-MCNC: 26 MG/DL — HIGH (ref 7–23)
BUN SERPL-MCNC: 26 MG/DL — HIGH (ref 7–23)
BUN SERPL-MCNC: 28 MG/DL — HIGH (ref 7–23)
BUN SERPL-MCNC: 31 MG/DL — HIGH (ref 7–23)
BUN SERPL-MCNC: 39 MG/DL — HIGH (ref 7–23)
BUN SERPL-MCNC: 5 MG/DL — LOW (ref 7–23)
BUN SERPL-MCNC: 7 MG/DL — SIGNIFICANT CHANGE UP (ref 7–23)
BUN SERPL-MCNC: 8 MG/DL — SIGNIFICANT CHANGE UP (ref 7–23)
BUN SERPL-MCNC: 8 MG/DL — SIGNIFICANT CHANGE UP (ref 7–23)
BUN SERPL-MCNC: 9 MG/DL — SIGNIFICANT CHANGE UP (ref 7–23)
BUN SERPL-MCNC: 9 MG/DL — SIGNIFICANT CHANGE UP (ref 7–23)
BUN SERPL-MCNC: SIGNIFICANT CHANGE UP (ref 7–23)
BURR CELLS BLD QL SMEAR: PRESENT — SIGNIFICANT CHANGE UP
BURR CELLS BLD QL SMEAR: PRESENT — SIGNIFICANT CHANGE UP
C AURIS DNA BLD POS QL NAA+PROBE: SIGNIFICANT CHANGE UP
C DIFF BY PCR RESULT: NEGATIVE — SIGNIFICANT CHANGE UP
C DIFF GDH STL QL: NEGATIVE — SIGNIFICANT CHANGE UP
C DIFF GDH STL QL: SIGNIFICANT CHANGE UP
C NEOFORM DNA BLD POS QL NAA+PROBE: SIGNIFICANT CHANGE UP
CALCIUM SERPL-MCNC: 7.2 MG/DL — LOW (ref 8.4–10.5)
CALCIUM SERPL-MCNC: 7.4 MG/DL — LOW (ref 8.4–10.5)
CALCIUM SERPL-MCNC: 7.5 MG/DL — LOW (ref 8.4–10.5)
CALCIUM SERPL-MCNC: 7.6 MG/DL — LOW (ref 8.4–10.5)
CALCIUM SERPL-MCNC: 7.6 MG/DL — LOW (ref 8.4–10.5)
CALCIUM SERPL-MCNC: 7.7 MG/DL — LOW (ref 8.4–10.5)
CALCIUM SERPL-MCNC: 7.8 MG/DL — LOW (ref 8.4–10.5)
CALCIUM SERPL-MCNC: 7.9 MG/DL — LOW (ref 8.4–10.5)
CALCIUM SERPL-MCNC: 8 MG/DL — LOW (ref 8.4–10.5)
CALCIUM SERPL-MCNC: 8.1 MG/DL — LOW (ref 8.4–10.5)
CALCIUM SERPL-MCNC: 8.2 MG/DL — LOW (ref 8.4–10.5)
CALCIUM SERPL-MCNC: 8.3 MG/DL — LOW (ref 8.4–10.5)
CALCIUM SERPL-MCNC: 8.4 MG/DL — SIGNIFICANT CHANGE UP (ref 8.4–10.5)
CALCIUM SERPL-MCNC: 8.5 MG/DL — SIGNIFICANT CHANGE UP (ref 8.4–10.5)
CALCIUM SERPL-MCNC: 8.6 MG/DL — SIGNIFICANT CHANGE UP (ref 8.4–10.5)
CALCIUM SERPL-MCNC: 8.7 MG/DL — SIGNIFICANT CHANGE UP (ref 8.4–10.5)
CALCIUM SERPL-MCNC: 8.8 MG/DL — SIGNIFICANT CHANGE UP (ref 8.4–10.5)
CALCIUM SERPL-MCNC: 8.9 MG/DL — SIGNIFICANT CHANGE UP (ref 8.4–10.5)
CALCIUM SERPL-MCNC: 8.9 MG/DL — SIGNIFICANT CHANGE UP (ref 8.4–10.5)
CALCIUM SERPL-MCNC: 9 MG/DL — SIGNIFICANT CHANGE UP (ref 8.4–10.5)
CALCIUM SERPL-MCNC: 9.2 MG/DL — SIGNIFICANT CHANGE UP (ref 8.4–10.5)
CALCIUM SERPL-MCNC: 9.3 MG/DL — SIGNIFICANT CHANGE UP (ref 8.4–10.5)
CALCIUM SERPL-MCNC: 9.5 MG/DL — SIGNIFICANT CHANGE UP (ref 8.4–10.5)
CALCIUM SERPL-MCNC: SIGNIFICANT CHANGE UP (ref 8.4–10.5)
CALCIUM SERPL-MCNC: SIGNIFICANT CHANGE UP MG/DL (ref 8.4–10.5)
CALCIUM SERPL-MCNC: SIGNIFICANT CHANGE UP MG/DL (ref 8.4–10.5)
CALCIUM UR-MCNC: 3.5 MG/DL — SIGNIFICANT CHANGE UP
CHLORIDE SERPL-SCNC: 100 MMOL/L — SIGNIFICANT CHANGE UP (ref 96–108)
CHLORIDE SERPL-SCNC: 101 MMOL/L — SIGNIFICANT CHANGE UP (ref 96–108)
CHLORIDE SERPL-SCNC: 102 MMOL/L — SIGNIFICANT CHANGE UP (ref 96–108)
CHLORIDE SERPL-SCNC: 103 MMOL/L — SIGNIFICANT CHANGE UP (ref 96–108)
CHLORIDE SERPL-SCNC: 104 MMOL/L — SIGNIFICANT CHANGE UP (ref 96–108)
CHLORIDE SERPL-SCNC: 105 MMOL/L — SIGNIFICANT CHANGE UP (ref 96–108)
CHLORIDE SERPL-SCNC: 106 MMOL/L — SIGNIFICANT CHANGE UP (ref 96–108)
CHLORIDE SERPL-SCNC: 107 MMOL/L — SIGNIFICANT CHANGE UP (ref 96–108)
CHLORIDE SERPL-SCNC: 108 MMOL/L — SIGNIFICANT CHANGE UP (ref 96–108)
CHLORIDE SERPL-SCNC: 108 MMOL/L — SIGNIFICANT CHANGE UP (ref 96–108)
CHLORIDE SERPL-SCNC: 109 MMOL/L — HIGH (ref 96–108)
CHLORIDE SERPL-SCNC: 109 MMOL/L — HIGH (ref 96–108)
CHLORIDE SERPL-SCNC: 110 MMOL/L — HIGH (ref 96–108)
CHLORIDE SERPL-SCNC: 95 MMOL/L — LOW (ref 96–108)
CHLORIDE SERPL-SCNC: 95 MMOL/L — LOW (ref 96–108)
CHLORIDE SERPL-SCNC: 96 MMOL/L — SIGNIFICANT CHANGE UP (ref 96–108)
CHLORIDE SERPL-SCNC: 97 MMOL/L — SIGNIFICANT CHANGE UP (ref 96–108)
CHLORIDE SERPL-SCNC: 98 MMOL/L — SIGNIFICANT CHANGE UP (ref 96–108)
CHLORIDE SERPL-SCNC: 99 MMOL/L — SIGNIFICANT CHANGE UP (ref 96–108)
CHLORIDE SERPL-SCNC: SIGNIFICANT CHANGE UP MMOL/L (ref 96–108)
CHLORIDE UR-SCNC: 143 MMOL/L — SIGNIFICANT CHANGE UP
CK SERPL-CCNC: 138 U/L — SIGNIFICANT CHANGE UP (ref 30–200)
CK SERPL-CCNC: 29 U/L — LOW (ref 30–200)
CK SERPL-CCNC: 29 U/L — LOW (ref 30–200)
CK SERPL-CCNC: 41 U/L — SIGNIFICANT CHANGE UP (ref 30–200)
CK SERPL-CCNC: 452 U/L — HIGH (ref 30–200)
CO2 BLDA-SCNC: 23 MMOL/L — SIGNIFICANT CHANGE UP (ref 19–24)
CO2 SERPL-SCNC: 19 MMOL/L — LOW (ref 22–31)
CO2 SERPL-SCNC: 19 MMOL/L — LOW (ref 22–31)
CO2 SERPL-SCNC: 20 MMOL/L — LOW (ref 22–31)
CO2 SERPL-SCNC: 21 MMOL/L — LOW (ref 22–31)
CO2 SERPL-SCNC: 22 MMOL/L — SIGNIFICANT CHANGE UP (ref 22–31)
CO2 SERPL-SCNC: 23 MMOL/L — SIGNIFICANT CHANGE UP (ref 22–31)
CO2 SERPL-SCNC: 24 MMOL/L — SIGNIFICANT CHANGE UP (ref 22–31)
CO2 SERPL-SCNC: 25 MMOL/L — SIGNIFICANT CHANGE UP (ref 22–31)
CO2 SERPL-SCNC: 26 MMOL/L — SIGNIFICANT CHANGE UP (ref 22–31)
CO2 SERPL-SCNC: 27 MMOL/L — SIGNIFICANT CHANGE UP (ref 22–31)
CO2 SERPL-SCNC: 28 MMOL/L — SIGNIFICANT CHANGE UP (ref 22–31)
CO2 SERPL-SCNC: 29 MMOL/L — SIGNIFICANT CHANGE UP (ref 22–31)
CO2 SERPL-SCNC: 30 MMOL/L — SIGNIFICANT CHANGE UP (ref 22–31)
CO2 SERPL-SCNC: SIGNIFICANT CHANGE UP (ref 22–31)
CO2 SERPL-SCNC: SIGNIFICANT CHANGE UP MMOL/L (ref 22–31)
CO2 SERPL-SCNC: SIGNIFICANT CHANGE UP MMOL/L (ref 22–31)
COCAINE METAB.OTHER UR-MCNC: NEGATIVE — SIGNIFICANT CHANGE UP
COD CRY URNS QL: ABNORMAL /HPF
COLOR SPEC: YELLOW — SIGNIFICANT CHANGE UP
COMMENT - URINE: SIGNIFICANT CHANGE UP
COPPER SERPL-MCNC: 118 UG/DL — SIGNIFICANT CHANGE UP (ref 69–132)
CREAT ?TM UR-MCNC: 15 MG/DL — SIGNIFICANT CHANGE UP
CREAT ?TM UR-MCNC: 45 MG/DL — SIGNIFICANT CHANGE UP
CREAT ?TM UR-MCNC: 53 MG/DL — SIGNIFICANT CHANGE UP
CREAT ?TM UR-MCNC: 9 MG/DL — SIGNIFICANT CHANGE UP
CREAT SERPL-MCNC: 0.38 MG/DL — LOW (ref 0.5–1.3)
CREAT SERPL-MCNC: 0.38 MG/DL — LOW (ref 0.5–1.3)
CREAT SERPL-MCNC: 0.39 MG/DL — LOW (ref 0.5–1.3)
CREAT SERPL-MCNC: 0.39 MG/DL — LOW (ref 0.5–1.3)
CREAT SERPL-MCNC: 0.42 MG/DL — LOW (ref 0.5–1.3)
CREAT SERPL-MCNC: 0.43 MG/DL — LOW (ref 0.5–1.3)
CREAT SERPL-MCNC: 0.44 MG/DL — LOW (ref 0.5–1.3)
CREAT SERPL-MCNC: 0.45 MG/DL — LOW (ref 0.5–1.3)
CREAT SERPL-MCNC: 0.46 MG/DL — LOW (ref 0.5–1.3)
CREAT SERPL-MCNC: 0.47 MG/DL — LOW (ref 0.5–1.3)
CREAT SERPL-MCNC: 0.47 MG/DL — LOW (ref 0.5–1.3)
CREAT SERPL-MCNC: 0.48 MG/DL — LOW (ref 0.5–1.3)
CREAT SERPL-MCNC: 0.49 MG/DL — LOW (ref 0.5–1.3)
CREAT SERPL-MCNC: 0.5 MG/DL — SIGNIFICANT CHANGE UP (ref 0.5–1.3)
CREAT SERPL-MCNC: 0.51 MG/DL — SIGNIFICANT CHANGE UP (ref 0.5–1.3)
CREAT SERPL-MCNC: 0.52 MG/DL — SIGNIFICANT CHANGE UP (ref 0.5–1.3)
CREAT SERPL-MCNC: 0.53 MG/DL — SIGNIFICANT CHANGE UP (ref 0.5–1.3)
CREAT SERPL-MCNC: 0.53 MG/DL — SIGNIFICANT CHANGE UP (ref 0.5–1.3)
CREAT SERPL-MCNC: 0.54 MG/DL — SIGNIFICANT CHANGE UP (ref 0.5–1.3)
CREAT SERPL-MCNC: 0.55 MG/DL — SIGNIFICANT CHANGE UP (ref 0.5–1.3)
CREAT SERPL-MCNC: 0.57 MG/DL — SIGNIFICANT CHANGE UP (ref 0.5–1.3)
CREAT SERPL-MCNC: 0.58 MG/DL — SIGNIFICANT CHANGE UP (ref 0.5–1.3)
CREAT SERPL-MCNC: 0.58 MG/DL — SIGNIFICANT CHANGE UP (ref 0.5–1.3)
CREAT SERPL-MCNC: 0.59 MG/DL — SIGNIFICANT CHANGE UP (ref 0.5–1.3)
CREAT SERPL-MCNC: 0.59 MG/DL — SIGNIFICANT CHANGE UP (ref 0.5–1.3)
CREAT SERPL-MCNC: 0.6 MG/DL — SIGNIFICANT CHANGE UP (ref 0.5–1.3)
CREAT SERPL-MCNC: 0.61 MG/DL — SIGNIFICANT CHANGE UP (ref 0.5–1.3)
CREAT SERPL-MCNC: 0.62 MG/DL — SIGNIFICANT CHANGE UP (ref 0.5–1.3)
CREAT SERPL-MCNC: 0.62 MG/DL — SIGNIFICANT CHANGE UP (ref 0.5–1.3)
CREAT SERPL-MCNC: 0.64 MG/DL — SIGNIFICANT CHANGE UP (ref 0.5–1.3)
CREAT SERPL-MCNC: 0.64 MG/DL — SIGNIFICANT CHANGE UP (ref 0.5–1.3)
CREAT SERPL-MCNC: 0.65 MG/DL — SIGNIFICANT CHANGE UP (ref 0.5–1.3)
CREAT SERPL-MCNC: 0.66 MG/DL — SIGNIFICANT CHANGE UP (ref 0.5–1.3)
CREAT SERPL-MCNC: 0.67 MG/DL — SIGNIFICANT CHANGE UP (ref 0.5–1.3)
CREAT SERPL-MCNC: 0.68 MG/DL — SIGNIFICANT CHANGE UP (ref 0.5–1.3)
CREAT SERPL-MCNC: 0.69 MG/DL — SIGNIFICANT CHANGE UP (ref 0.5–1.3)
CREAT SERPL-MCNC: 0.7 MG/DL — SIGNIFICANT CHANGE UP (ref 0.5–1.3)
CREAT SERPL-MCNC: 0.71 MG/DL — SIGNIFICANT CHANGE UP (ref 0.5–1.3)
CREAT SERPL-MCNC: 0.71 MG/DL — SIGNIFICANT CHANGE UP (ref 0.5–1.3)
CREAT SERPL-MCNC: 0.72 MG/DL — SIGNIFICANT CHANGE UP (ref 0.5–1.3)
CREAT SERPL-MCNC: 0.73 MG/DL — SIGNIFICANT CHANGE UP (ref 0.5–1.3)
CREAT SERPL-MCNC: 0.76 MG/DL — SIGNIFICANT CHANGE UP (ref 0.5–1.3)
CREAT SERPL-MCNC: 0.77 MG/DL — SIGNIFICANT CHANGE UP (ref 0.5–1.3)
CREAT SERPL-MCNC: 0.8 MG/DL — SIGNIFICANT CHANGE UP (ref 0.5–1.3)
CREAT SERPL-MCNC: 0.87 MG/DL — SIGNIFICANT CHANGE UP (ref 0.5–1.3)
CREAT SERPL-MCNC: 0.92 MG/DL — SIGNIFICANT CHANGE UP (ref 0.5–1.3)
CREAT SERPL-MCNC: 1.18 MG/DL — SIGNIFICANT CHANGE UP (ref 0.5–1.3)
CRP SERPL-MCNC: 29.2 MG/L — HIGH (ref 0–4)
CULTURE RESULTS: NO GROWTH — SIGNIFICANT CHANGE UP
CULTURE RESULTS: SIGNIFICANT CHANGE UP
DIFF PNL FLD: ABNORMAL
DIFF PNL FLD: NEGATIVE — SIGNIFICANT CHANGE UP
E CLOAC COMP DNA BLD POS QL NAA+PROBE: SIGNIFICANT CHANGE UP
E COLI DNA BLD POS QL NAA+NON-PROBE: SIGNIFICANT CHANGE UP
E FAECALIS DNA BLD POS QL NAA+NON-PROBE: SIGNIFICANT CHANGE UP
E FAECALIS DNA BLD POS QL NAA+NON-PROBE: SIGNIFICANT CHANGE UP
E FAECIUM DNA BLD POS QL NAA+NON-PROBE: SIGNIFICANT CHANGE UP
EGFR: 100 ML/MIN/1.73M2 — SIGNIFICANT CHANGE UP
EGFR: 101 ML/MIN/1.73M2 — SIGNIFICANT CHANGE UP
EGFR: 102 ML/MIN/1.73M2 — SIGNIFICANT CHANGE UP
EGFR: 103 ML/MIN/1.73M2 — SIGNIFICANT CHANGE UP
EGFR: 104 ML/MIN/1.73M2 — SIGNIFICANT CHANGE UP
EGFR: 105 ML/MIN/1.73M2 — SIGNIFICANT CHANGE UP
EGFR: 106 ML/MIN/1.73M2 — SIGNIFICANT CHANGE UP
EGFR: 107 ML/MIN/1.73M2 — SIGNIFICANT CHANGE UP
EGFR: 108 ML/MIN/1.73M2 — SIGNIFICANT CHANGE UP
EGFR: 109 ML/MIN/1.73M2 — SIGNIFICANT CHANGE UP
EGFR: 110 ML/MIN/1.73M2 — SIGNIFICANT CHANGE UP
EGFR: 111 ML/MIN/1.73M2 — SIGNIFICANT CHANGE UP
EGFR: 114 ML/MIN/1.73M2 — SIGNIFICANT CHANGE UP
EGFR: 114 ML/MIN/1.73M2 — SIGNIFICANT CHANGE UP
EGFR: 115 ML/MIN/1.73M2 — SIGNIFICANT CHANGE UP
EGFR: 115 ML/MIN/1.73M2 — SIGNIFICANT CHANGE UP
EGFR: 64 ML/MIN/1.73M2 — SIGNIFICANT CHANGE UP
EGFR: 86 ML/MIN/1.73M2 — SIGNIFICANT CHANGE UP
EGFR: 90 ML/MIN/1.73M2 — SIGNIFICANT CHANGE UP
EGFR: 92 ML/MIN/1.73M2 — SIGNIFICANT CHANGE UP
EGFR: 93 ML/MIN/1.73M2 — SIGNIFICANT CHANGE UP
EGFR: 94 ML/MIN/1.73M2 — SIGNIFICANT CHANGE UP
EGFR: 95 ML/MIN/1.73M2 — SIGNIFICANT CHANGE UP
EGFR: 96 ML/MIN/1.73M2 — SIGNIFICANT CHANGE UP
EGFR: 97 ML/MIN/1.73M2 — SIGNIFICANT CHANGE UP
EGFR: 98 ML/MIN/1.73M2 — SIGNIFICANT CHANGE UP
EGFR: 98 ML/MIN/1.73M2 — SIGNIFICANT CHANGE UP
EGFR: 99 ML/MIN/1.73M2 — SIGNIFICANT CHANGE UP
EOSINOPHIL # BLD AUTO: 0 K/UL — SIGNIFICANT CHANGE UP (ref 0–0.5)
EOSINOPHIL # BLD AUTO: 0 K/UL — SIGNIFICANT CHANGE UP (ref 0–0.5)
EOSINOPHIL # BLD AUTO: 0.03 K/UL — SIGNIFICANT CHANGE UP (ref 0–0.5)
EOSINOPHIL # BLD AUTO: 0.1 K/UL — SIGNIFICANT CHANGE UP (ref 0–0.5)
EOSINOPHIL # BLD AUTO: 0.13 K/UL — SIGNIFICANT CHANGE UP (ref 0–0.5)
EOSINOPHIL # BLD AUTO: 0.15 K/UL — SIGNIFICANT CHANGE UP (ref 0–0.5)
EOSINOPHIL # BLD AUTO: 0.16 K/UL — SIGNIFICANT CHANGE UP (ref 0–0.5)
EOSINOPHIL # BLD AUTO: 0.17 K/UL — SIGNIFICANT CHANGE UP (ref 0–0.5)
EOSINOPHIL # BLD AUTO: 0.18 K/UL — SIGNIFICANT CHANGE UP (ref 0–0.5)
EOSINOPHIL # BLD AUTO: 0.19 K/UL — SIGNIFICANT CHANGE UP (ref 0–0.5)
EOSINOPHIL # BLD AUTO: 0.61 K/UL — HIGH (ref 0–0.5)
EOSINOPHIL NFR BLD AUTO: 0 % — SIGNIFICANT CHANGE UP (ref 0–6)
EOSINOPHIL NFR BLD AUTO: 0 % — SIGNIFICANT CHANGE UP (ref 0–6)
EOSINOPHIL NFR BLD AUTO: 0.2 % — SIGNIFICANT CHANGE UP (ref 0–6)
EOSINOPHIL NFR BLD AUTO: 0.9 % — SIGNIFICANT CHANGE UP (ref 0–6)
EOSINOPHIL NFR BLD AUTO: 1.1 % — SIGNIFICANT CHANGE UP (ref 0–6)
EOSINOPHIL NFR BLD AUTO: 1.2 % — SIGNIFICANT CHANGE UP (ref 0–6)
EOSINOPHIL NFR BLD AUTO: 1.4 % — SIGNIFICANT CHANGE UP (ref 0–6)
EOSINOPHIL NFR BLD AUTO: 1.7 % — SIGNIFICANT CHANGE UP (ref 0–6)
EOSINOPHIL NFR BLD AUTO: 1.8 % — SIGNIFICANT CHANGE UP (ref 0–6)
EOSINOPHIL NFR BLD AUTO: 1.9 % — SIGNIFICANT CHANGE UP (ref 0–6)
EOSINOPHIL NFR BLD AUTO: 2.1 % — SIGNIFICANT CHANGE UP (ref 0–6)
EPI CELLS # UR: ABNORMAL /HPF (ref 0–5)
EPI CELLS # UR: SIGNIFICANT CHANGE UP /HPF (ref 0–5)
FERRITIN SERPL-MCNC: 1290 NG/ML — HIGH (ref 30–400)
FERRITIN SERPL-MCNC: 1736 NG/ML — HIGH (ref 30–400)
FIBRINOGEN PPP-MCNC: 346 MG/DL — SIGNIFICANT CHANGE UP (ref 258–438)
FLUAV AG NPH QL: SIGNIFICANT CHANGE UP
FLUBV AG NPH QL: SIGNIFICANT CHANGE UP
FOLATE SERPL-MCNC: 9.9 NG/ML — SIGNIFICANT CHANGE UP
FOLATE SERPL-MCNC: >20 NG/ML — SIGNIFICANT CHANGE UP
FUNGITELL: <31 PG/ML — SIGNIFICANT CHANGE UP
GAS PNL BLDA: SIGNIFICANT CHANGE UP
GGT SERPL-CCNC: 71 U/L — HIGH (ref 9–50)
GGT SERPL-CCNC: 88 U/L — HIGH (ref 9–50)
GIANT PLATELETS BLD QL SMEAR: PRESENT — SIGNIFICANT CHANGE UP
GIANT PLATELETS BLD QL SMEAR: PRESENT — SIGNIFICANT CHANGE UP
GLUCOSE BLDC GLUCOMTR-MCNC: 100 MG/DL — HIGH (ref 70–99)
GLUCOSE BLDC GLUCOMTR-MCNC: 100 MG/DL — HIGH (ref 70–99)
GLUCOSE BLDC GLUCOMTR-MCNC: 101 MG/DL — HIGH (ref 70–99)
GLUCOSE BLDC GLUCOMTR-MCNC: 102 MG/DL — HIGH (ref 70–99)
GLUCOSE BLDC GLUCOMTR-MCNC: 102 MG/DL — HIGH (ref 70–99)
GLUCOSE BLDC GLUCOMTR-MCNC: 103 MG/DL — HIGH (ref 70–99)
GLUCOSE BLDC GLUCOMTR-MCNC: 104 MG/DL — HIGH (ref 70–99)
GLUCOSE BLDC GLUCOMTR-MCNC: 104 MG/DL — HIGH (ref 70–99)
GLUCOSE BLDC GLUCOMTR-MCNC: 105 MG/DL — HIGH (ref 70–99)
GLUCOSE BLDC GLUCOMTR-MCNC: 106 MG/DL — HIGH (ref 70–99)
GLUCOSE BLDC GLUCOMTR-MCNC: 106 MG/DL — HIGH (ref 70–99)
GLUCOSE BLDC GLUCOMTR-MCNC: 107 MG/DL — HIGH (ref 70–99)
GLUCOSE BLDC GLUCOMTR-MCNC: 108 MG/DL — HIGH (ref 70–99)
GLUCOSE BLDC GLUCOMTR-MCNC: 109 MG/DL — HIGH (ref 70–99)
GLUCOSE BLDC GLUCOMTR-MCNC: 111 MG/DL — HIGH (ref 70–99)
GLUCOSE BLDC GLUCOMTR-MCNC: 113 MG/DL — HIGH (ref 70–99)
GLUCOSE BLDC GLUCOMTR-MCNC: 114 MG/DL — HIGH (ref 70–99)
GLUCOSE BLDC GLUCOMTR-MCNC: 115 MG/DL — HIGH (ref 70–99)
GLUCOSE BLDC GLUCOMTR-MCNC: 115 MG/DL — HIGH (ref 70–99)
GLUCOSE BLDC GLUCOMTR-MCNC: 116 MG/DL — HIGH (ref 70–99)
GLUCOSE BLDC GLUCOMTR-MCNC: 117 MG/DL — HIGH (ref 70–99)
GLUCOSE BLDC GLUCOMTR-MCNC: 118 MG/DL — HIGH (ref 70–99)
GLUCOSE BLDC GLUCOMTR-MCNC: 118 MG/DL — HIGH (ref 70–99)
GLUCOSE BLDC GLUCOMTR-MCNC: 119 MG/DL — HIGH (ref 70–99)
GLUCOSE BLDC GLUCOMTR-MCNC: 121 MG/DL — HIGH (ref 70–99)
GLUCOSE BLDC GLUCOMTR-MCNC: 122 MG/DL — HIGH (ref 70–99)
GLUCOSE BLDC GLUCOMTR-MCNC: 123 MG/DL — HIGH (ref 70–99)
GLUCOSE BLDC GLUCOMTR-MCNC: 124 MG/DL — HIGH (ref 70–99)
GLUCOSE BLDC GLUCOMTR-MCNC: 124 MG/DL — HIGH (ref 70–99)
GLUCOSE BLDC GLUCOMTR-MCNC: 125 MG/DL — HIGH (ref 70–99)
GLUCOSE BLDC GLUCOMTR-MCNC: 126 MG/DL — HIGH (ref 70–99)
GLUCOSE BLDC GLUCOMTR-MCNC: 127 MG/DL — HIGH (ref 70–99)
GLUCOSE BLDC GLUCOMTR-MCNC: 128 MG/DL — HIGH (ref 70–99)
GLUCOSE BLDC GLUCOMTR-MCNC: 129 MG/DL — HIGH (ref 70–99)
GLUCOSE BLDC GLUCOMTR-MCNC: 130 MG/DL — HIGH (ref 70–99)
GLUCOSE BLDC GLUCOMTR-MCNC: 131 MG/DL — HIGH (ref 70–99)
GLUCOSE BLDC GLUCOMTR-MCNC: 132 MG/DL — HIGH (ref 70–99)
GLUCOSE BLDC GLUCOMTR-MCNC: 133 MG/DL — HIGH (ref 70–99)
GLUCOSE BLDC GLUCOMTR-MCNC: 133 MG/DL — HIGH (ref 70–99)
GLUCOSE BLDC GLUCOMTR-MCNC: 134 MG/DL — HIGH (ref 70–99)
GLUCOSE BLDC GLUCOMTR-MCNC: 134 MG/DL — HIGH (ref 70–99)
GLUCOSE BLDC GLUCOMTR-MCNC: 135 MG/DL — HIGH (ref 70–99)
GLUCOSE BLDC GLUCOMTR-MCNC: 136 MG/DL — HIGH (ref 70–99)
GLUCOSE BLDC GLUCOMTR-MCNC: 136 MG/DL — HIGH (ref 70–99)
GLUCOSE BLDC GLUCOMTR-MCNC: 137 MG/DL — HIGH (ref 70–99)
GLUCOSE BLDC GLUCOMTR-MCNC: 138 MG/DL — HIGH (ref 70–99)
GLUCOSE BLDC GLUCOMTR-MCNC: 139 MG/DL — HIGH (ref 70–99)
GLUCOSE BLDC GLUCOMTR-MCNC: 140 MG/DL — HIGH (ref 70–99)
GLUCOSE BLDC GLUCOMTR-MCNC: 141 MG/DL — HIGH (ref 70–99)
GLUCOSE BLDC GLUCOMTR-MCNC: 141 MG/DL — HIGH (ref 70–99)
GLUCOSE BLDC GLUCOMTR-MCNC: 142 MG/DL — HIGH (ref 70–99)
GLUCOSE BLDC GLUCOMTR-MCNC: 142 MG/DL — HIGH (ref 70–99)
GLUCOSE BLDC GLUCOMTR-MCNC: 143 MG/DL — HIGH (ref 70–99)
GLUCOSE BLDC GLUCOMTR-MCNC: 143 MG/DL — HIGH (ref 70–99)
GLUCOSE BLDC GLUCOMTR-MCNC: 144 MG/DL — HIGH (ref 70–99)
GLUCOSE BLDC GLUCOMTR-MCNC: 144 MG/DL — HIGH (ref 70–99)
GLUCOSE BLDC GLUCOMTR-MCNC: 145 MG/DL — HIGH (ref 70–99)
GLUCOSE BLDC GLUCOMTR-MCNC: 146 MG/DL — HIGH (ref 70–99)
GLUCOSE BLDC GLUCOMTR-MCNC: 146 MG/DL — HIGH (ref 70–99)
GLUCOSE BLDC GLUCOMTR-MCNC: 147 MG/DL — HIGH (ref 70–99)
GLUCOSE BLDC GLUCOMTR-MCNC: 147 MG/DL — HIGH (ref 70–99)
GLUCOSE BLDC GLUCOMTR-MCNC: 150 MG/DL — HIGH (ref 70–99)
GLUCOSE BLDC GLUCOMTR-MCNC: 150 MG/DL — HIGH (ref 70–99)
GLUCOSE BLDC GLUCOMTR-MCNC: 152 MG/DL — HIGH (ref 70–99)
GLUCOSE BLDC GLUCOMTR-MCNC: 153 MG/DL — HIGH (ref 70–99)
GLUCOSE BLDC GLUCOMTR-MCNC: 155 MG/DL — HIGH (ref 70–99)
GLUCOSE BLDC GLUCOMTR-MCNC: 158 MG/DL — HIGH (ref 70–99)
GLUCOSE BLDC GLUCOMTR-MCNC: 159 MG/DL — HIGH (ref 70–99)
GLUCOSE BLDC GLUCOMTR-MCNC: 162 MG/DL — HIGH (ref 70–99)
GLUCOSE BLDC GLUCOMTR-MCNC: 167 MG/DL — HIGH (ref 70–99)
GLUCOSE BLDC GLUCOMTR-MCNC: 169 MG/DL — HIGH (ref 70–99)
GLUCOSE BLDC GLUCOMTR-MCNC: 183 MG/DL — HIGH (ref 70–99)
GLUCOSE BLDC GLUCOMTR-MCNC: 185 MG/DL — HIGH (ref 70–99)
GLUCOSE BLDC GLUCOMTR-MCNC: 186 MG/DL — HIGH (ref 70–99)
GLUCOSE BLDC GLUCOMTR-MCNC: 189 MG/DL — HIGH (ref 70–99)
GLUCOSE BLDC GLUCOMTR-MCNC: 194 MG/DL — HIGH (ref 70–99)
GLUCOSE BLDC GLUCOMTR-MCNC: 490 MG/DL — CRITICAL HIGH (ref 70–99)
GLUCOSE BLDC GLUCOMTR-MCNC: 82 MG/DL — SIGNIFICANT CHANGE UP (ref 70–99)
GLUCOSE BLDC GLUCOMTR-MCNC: 83 MG/DL — SIGNIFICANT CHANGE UP (ref 70–99)
GLUCOSE BLDC GLUCOMTR-MCNC: 89 MG/DL — SIGNIFICANT CHANGE UP (ref 70–99)
GLUCOSE BLDC GLUCOMTR-MCNC: 89 MG/DL — SIGNIFICANT CHANGE UP (ref 70–99)
GLUCOSE BLDC GLUCOMTR-MCNC: 91 MG/DL — SIGNIFICANT CHANGE UP (ref 70–99)
GLUCOSE BLDC GLUCOMTR-MCNC: 91 MG/DL — SIGNIFICANT CHANGE UP (ref 70–99)
GLUCOSE BLDC GLUCOMTR-MCNC: 92 MG/DL — SIGNIFICANT CHANGE UP (ref 70–99)
GLUCOSE BLDC GLUCOMTR-MCNC: 93 MG/DL — SIGNIFICANT CHANGE UP (ref 70–99)
GLUCOSE BLDC GLUCOMTR-MCNC: 95 MG/DL — SIGNIFICANT CHANGE UP (ref 70–99)
GLUCOSE BLDC GLUCOMTR-MCNC: 96 MG/DL — SIGNIFICANT CHANGE UP (ref 70–99)
GLUCOSE BLDC GLUCOMTR-MCNC: 96 MG/DL — SIGNIFICANT CHANGE UP (ref 70–99)
GLUCOSE BLDC GLUCOMTR-MCNC: 97 MG/DL — SIGNIFICANT CHANGE UP (ref 70–99)
GLUCOSE BLDC GLUCOMTR-MCNC: 97 MG/DL — SIGNIFICANT CHANGE UP (ref 70–99)
GLUCOSE BLDC GLUCOMTR-MCNC: 98 MG/DL — SIGNIFICANT CHANGE UP (ref 70–99)
GLUCOSE BLDC GLUCOMTR-MCNC: 99 MG/DL — SIGNIFICANT CHANGE UP (ref 70–99)
GLUCOSE SERPL-MCNC: 103 MG/DL — HIGH (ref 70–99)
GLUCOSE SERPL-MCNC: 103 MG/DL — HIGH (ref 70–99)
GLUCOSE SERPL-MCNC: 104 MG/DL — HIGH (ref 70–99)
GLUCOSE SERPL-MCNC: 105 MG/DL — HIGH (ref 70–99)
GLUCOSE SERPL-MCNC: 106 MG/DL — HIGH (ref 70–99)
GLUCOSE SERPL-MCNC: 107 MG/DL — HIGH (ref 70–99)
GLUCOSE SERPL-MCNC: 109 MG/DL — HIGH (ref 70–99)
GLUCOSE SERPL-MCNC: 110 MG/DL — HIGH (ref 70–99)
GLUCOSE SERPL-MCNC: 110 MG/DL — HIGH (ref 70–99)
GLUCOSE SERPL-MCNC: 111 MG/DL — HIGH (ref 70–99)
GLUCOSE SERPL-MCNC: 117 MG/DL — HIGH (ref 70–99)
GLUCOSE SERPL-MCNC: 118 MG/DL — HIGH (ref 70–99)
GLUCOSE SERPL-MCNC: 119 MG/DL — HIGH (ref 70–99)
GLUCOSE SERPL-MCNC: 120 MG/DL — HIGH (ref 70–99)
GLUCOSE SERPL-MCNC: 120 MG/DL — HIGH (ref 70–99)
GLUCOSE SERPL-MCNC: 122 MG/DL — HIGH (ref 70–99)
GLUCOSE SERPL-MCNC: 123 MG/DL — HIGH (ref 70–99)
GLUCOSE SERPL-MCNC: 124 MG/DL — HIGH (ref 70–99)
GLUCOSE SERPL-MCNC: 125 MG/DL — HIGH (ref 70–99)
GLUCOSE SERPL-MCNC: 125 MG/DL — HIGH (ref 70–99)
GLUCOSE SERPL-MCNC: 126 MG/DL — HIGH (ref 70–99)
GLUCOSE SERPL-MCNC: 127 MG/DL — HIGH (ref 70–99)
GLUCOSE SERPL-MCNC: 128 MG/DL — HIGH (ref 70–99)
GLUCOSE SERPL-MCNC: 129 MG/DL — HIGH (ref 70–99)
GLUCOSE SERPL-MCNC: 130 MG/DL — HIGH (ref 70–99)
GLUCOSE SERPL-MCNC: 130 MG/DL — HIGH (ref 70–99)
GLUCOSE SERPL-MCNC: 131 MG/DL — HIGH (ref 70–99)
GLUCOSE SERPL-MCNC: 132 MG/DL — HIGH (ref 70–99)
GLUCOSE SERPL-MCNC: 132 MG/DL — HIGH (ref 70–99)
GLUCOSE SERPL-MCNC: 133 MG/DL — HIGH (ref 70–99)
GLUCOSE SERPL-MCNC: 134 MG/DL — HIGH (ref 70–99)
GLUCOSE SERPL-MCNC: 135 MG/DL — HIGH (ref 70–99)
GLUCOSE SERPL-MCNC: 136 MG/DL — HIGH (ref 70–99)
GLUCOSE SERPL-MCNC: 136 MG/DL — HIGH (ref 70–99)
GLUCOSE SERPL-MCNC: 137 MG/DL — HIGH (ref 70–99)
GLUCOSE SERPL-MCNC: 138 MG/DL — HIGH (ref 70–99)
GLUCOSE SERPL-MCNC: 139 MG/DL — HIGH (ref 70–99)
GLUCOSE SERPL-MCNC: 142 MG/DL — HIGH (ref 70–99)
GLUCOSE SERPL-MCNC: 143 MG/DL — HIGH (ref 70–99)
GLUCOSE SERPL-MCNC: 143 MG/DL — HIGH (ref 70–99)
GLUCOSE SERPL-MCNC: 145 MG/DL — HIGH (ref 70–99)
GLUCOSE SERPL-MCNC: 150 MG/DL — HIGH (ref 70–99)
GLUCOSE SERPL-MCNC: 151 MG/DL — HIGH (ref 70–99)
GLUCOSE SERPL-MCNC: 153 MG/DL — HIGH (ref 70–99)
GLUCOSE SERPL-MCNC: 173 MG/DL — HIGH (ref 70–99)
GLUCOSE SERPL-MCNC: 177 MG/DL — HIGH (ref 70–99)
GLUCOSE SERPL-MCNC: 194 MG/DL — HIGH (ref 70–99)
GLUCOSE SERPL-MCNC: 214 MG/DL — HIGH (ref 70–99)
GLUCOSE SERPL-MCNC: 89 MG/DL — SIGNIFICANT CHANGE UP (ref 70–99)
GLUCOSE SERPL-MCNC: 91 MG/DL — SIGNIFICANT CHANGE UP (ref 70–99)
GLUCOSE SERPL-MCNC: 94 MG/DL — SIGNIFICANT CHANGE UP (ref 70–99)
GLUCOSE SERPL-MCNC: 96 MG/DL — SIGNIFICANT CHANGE UP (ref 70–99)
GLUCOSE SERPL-MCNC: 97 MG/DL — SIGNIFICANT CHANGE UP (ref 70–99)
GLUCOSE SERPL-MCNC: 98 MG/DL — SIGNIFICANT CHANGE UP (ref 70–99)
GLUCOSE SERPL-MCNC: 98 MG/DL — SIGNIFICANT CHANGE UP (ref 70–99)
GLUCOSE SERPL-MCNC: 99 MG/DL — SIGNIFICANT CHANGE UP (ref 70–99)
GLUCOSE SERPL-MCNC: SIGNIFICANT CHANGE UP (ref 70–99)
GLUCOSE SERPL-MCNC: SIGNIFICANT CHANGE UP MG/DL (ref 70–99)
GLUCOSE UR QL: NEGATIVE — SIGNIFICANT CHANGE UP
GP B STREP DNA BLD POS QL NAA+NON-PROBE: SIGNIFICANT CHANGE UP
GRAM STN FLD: SIGNIFICANT CHANGE UP
GRAN CASTS # UR COMP ASSIST: ABNORMAL /LPF
HAEM INFLU DNA BLD POS QL NAA+NON-PROBE: SIGNIFICANT CHANGE UP
HAV IGG SER QL IA: REACTIVE
HAV IGM SER-ACNC: SIGNIFICANT CHANGE UP
HBV CORE AB SER-ACNC: REACTIVE
HBV DNA # SERPL NAA+PROBE: SIGNIFICANT CHANGE UP
HBV DNA SERPL NAA+PROBE-LOG#: SIGNIFICANT CHANGE UP LOGIU/ML
HBV SURFACE AB SER-ACNC: REACTIVE
HBV SURFACE AG SER-ACNC: SIGNIFICANT CHANGE UP
HCO3 BLDA-SCNC: 22 MMOL/L — SIGNIFICANT CHANGE UP (ref 21–28)
HCT VFR BLD CALC: 15 % — CRITICAL LOW (ref 39–50)
HCT VFR BLD CALC: 18 % — CRITICAL LOW (ref 39–50)
HCT VFR BLD CALC: 18.7 % — CRITICAL LOW (ref 39–50)
HCT VFR BLD CALC: 19.4 % — CRITICAL LOW (ref 39–50)
HCT VFR BLD CALC: 20.9 % — CRITICAL LOW (ref 39–50)
HCT VFR BLD CALC: 21 % — CRITICAL LOW (ref 39–50)
HCT VFR BLD CALC: 21.3 % — LOW (ref 39–50)
HCT VFR BLD CALC: 21.9 % — LOW (ref 39–50)
HCT VFR BLD CALC: 22.1 % — LOW (ref 39–50)
HCT VFR BLD CALC: 22.5 % — LOW (ref 39–50)
HCT VFR BLD CALC: 22.6 % — LOW (ref 39–50)
HCT VFR BLD CALC: 22.7 % — LOW (ref 39–50)
HCT VFR BLD CALC: 22.8 % — LOW (ref 39–50)
HCT VFR BLD CALC: 23 % — LOW (ref 39–50)
HCT VFR BLD CALC: 23.1 % — LOW (ref 39–50)
HCT VFR BLD CALC: 23.2 % — LOW (ref 39–50)
HCT VFR BLD CALC: 23.2 % — LOW (ref 39–50)
HCT VFR BLD CALC: 23.3 % — LOW (ref 39–50)
HCT VFR BLD CALC: 23.4 % — LOW (ref 39–50)
HCT VFR BLD CALC: 24 % — LOW (ref 39–50)
HCT VFR BLD CALC: 24.1 % — LOW (ref 39–50)
HCT VFR BLD CALC: 24.1 % — LOW (ref 39–50)
HCT VFR BLD CALC: 24.2 % — LOW (ref 39–50)
HCT VFR BLD CALC: 24.4 % — LOW (ref 39–50)
HCT VFR BLD CALC: 24.4 % — LOW (ref 39–50)
HCT VFR BLD CALC: 24.5 % — LOW (ref 39–50)
HCT VFR BLD CALC: 24.8 % — LOW (ref 39–50)
HCT VFR BLD CALC: 24.9 % — LOW (ref 39–50)
HCT VFR BLD CALC: 25 % — LOW (ref 39–50)
HCT VFR BLD CALC: 25.2 % — LOW (ref 39–50)
HCT VFR BLD CALC: 25.3 % — LOW (ref 39–50)
HCT VFR BLD CALC: 25.4 % — LOW (ref 39–50)
HCT VFR BLD CALC: 25.4 % — LOW (ref 39–50)
HCT VFR BLD CALC: 25.5 % — LOW (ref 39–50)
HCT VFR BLD CALC: 25.5 % — LOW (ref 39–50)
HCT VFR BLD CALC: 25.7 % — LOW (ref 39–50)
HCT VFR BLD CALC: 25.8 % — LOW (ref 39–50)
HCT VFR BLD CALC: 25.9 % — LOW (ref 39–50)
HCT VFR BLD CALC: 25.9 % — LOW (ref 39–50)
HCT VFR BLD CALC: 26.1 % — LOW (ref 39–50)
HCT VFR BLD CALC: 26.3 % — LOW (ref 39–50)
HCT VFR BLD CALC: 26.3 % — LOW (ref 39–50)
HCT VFR BLD CALC: 26.4 % — LOW (ref 39–50)
HCT VFR BLD CALC: 26.7 % — LOW (ref 39–50)
HCT VFR BLD CALC: 26.7 % — LOW (ref 39–50)
HCT VFR BLD CALC: 26.9 % — LOW (ref 39–50)
HCT VFR BLD CALC: 27 % — LOW (ref 39–50)
HCT VFR BLD CALC: 27 % — LOW (ref 39–50)
HCT VFR BLD CALC: 27.1 % — LOW (ref 39–50)
HCT VFR BLD CALC: 27.3 % — LOW (ref 39–50)
HCT VFR BLD CALC: 27.4 % — LOW (ref 39–50)
HCT VFR BLD CALC: 27.5 % — LOW (ref 39–50)
HCT VFR BLD CALC: 27.6 % — LOW (ref 39–50)
HCT VFR BLD CALC: 27.7 % — LOW (ref 39–50)
HCT VFR BLD CALC: 27.9 % — LOW (ref 39–50)
HCT VFR BLD CALC: 28.3 % — LOW (ref 39–50)
HCT VFR BLD CALC: 28.4 % — LOW (ref 39–50)
HCT VFR BLD CALC: 28.8 % — LOW (ref 39–50)
HCT VFR BLD CALC: 29.1 % — LOW (ref 39–50)
HCT VFR BLD CALC: 29.3 % — LOW (ref 39–50)
HCT VFR BLD CALC: 29.4 % — LOW (ref 39–50)
HCT VFR BLD CALC: 29.4 % — LOW (ref 39–50)
HCT VFR BLD CALC: 29.6 % — LOW (ref 39–50)
HCT VFR BLD CALC: 29.8 % — LOW (ref 39–50)
HCT VFR BLD CALC: 29.9 % — LOW (ref 39–50)
HCT VFR BLD CALC: 30.1 % — LOW (ref 39–50)
HCT VFR BLD CALC: 30.2 % — LOW (ref 39–50)
HCT VFR BLD CALC: 30.2 % — LOW (ref 39–50)
HCT VFR BLD CALC: 30.3 % — LOW (ref 39–50)
HCT VFR BLD CALC: 30.5 % — LOW (ref 39–50)
HCT VFR BLD CALC: 30.9 % — LOW (ref 39–50)
HCT VFR BLD CALC: 31 % — LOW (ref 39–50)
HCT VFR BLD CALC: 31.3 % — LOW (ref 39–50)
HCT VFR BLD CALC: 31.4 % — LOW (ref 39–50)
HCT VFR BLD CALC: 31.4 % — LOW (ref 39–50)
HCT VFR BLD CALC: 31.9 % — LOW (ref 39–50)
HCT VFR BLD CALC: 32.7 % — LOW (ref 39–50)
HCT VFR BLD CALC: 32.7 % — LOW (ref 39–50)
HCT VFR BLD CALC: 33.2 % — LOW (ref 39–50)
HCT VFR BLD CALC: 33.3 % — LOW (ref 39–50)
HCT VFR BLD CALC: 33.3 % — LOW (ref 39–50)
HCT VFR BLD CALC: 33.6 % — LOW (ref 39–50)
HCT VFR BLD CALC: 33.8 % — LOW (ref 39–50)
HCT VFR BLD CALC: 33.9 % — LOW (ref 39–50)
HCT VFR BLD CALC: 34.6 % — LOW (ref 39–50)
HCT VFR BLD CALC: 34.8 % — LOW (ref 39–50)
HCT VFR BLD CALC: 34.9 % — LOW (ref 39–50)
HCT VFR BLD CALC: 35.1 % — LOW (ref 39–50)
HCT VFR BLD CALC: 36 % — LOW (ref 39–50)
HCT VFR BLD CALC: 36.3 % — LOW (ref 39–50)
HGB BLD-MCNC: 10 G/DL — LOW (ref 13–17)
HGB BLD-MCNC: 10 G/DL — LOW (ref 13–17)
HGB BLD-MCNC: 10.1 G/DL — LOW (ref 13–17)
HGB BLD-MCNC: 10.4 G/DL — LOW (ref 13–17)
HGB BLD-MCNC: 10.5 G/DL — LOW (ref 13–17)
HGB BLD-MCNC: 10.5 G/DL — LOW (ref 13–17)
HGB BLD-MCNC: 10.6 G/DL — LOW (ref 13–17)
HGB BLD-MCNC: 10.8 G/DL — LOW (ref 13–17)
HGB BLD-MCNC: 10.9 G/DL — LOW (ref 13–17)
HGB BLD-MCNC: 11 G/DL — LOW (ref 13–17)
HGB BLD-MCNC: 11 G/DL — LOW (ref 13–17)
HGB BLD-MCNC: 11.4 G/DL — LOW (ref 13–17)
HGB BLD-MCNC: 11.9 G/DL — LOW (ref 13–17)
HGB BLD-MCNC: 4.7 G/DL — CRITICAL LOW (ref 13–17)
HGB BLD-MCNC: 5.7 G/DL — CRITICAL LOW (ref 13–17)
HGB BLD-MCNC: 6 G/DL — CRITICAL LOW (ref 13–17)
HGB BLD-MCNC: 6.1 G/DL — CRITICAL LOW (ref 13–17)
HGB BLD-MCNC: 6.3 G/DL — CRITICAL LOW (ref 13–17)
HGB BLD-MCNC: 6.8 G/DL — CRITICAL LOW (ref 13–17)
HGB BLD-MCNC: 6.9 G/DL — CRITICAL LOW (ref 13–17)
HGB BLD-MCNC: 7 G/DL — CRITICAL LOW (ref 13–17)
HGB BLD-MCNC: 7 G/DL — CRITICAL LOW (ref 13–17)
HGB BLD-MCNC: 7.1 G/DL — LOW (ref 13–17)
HGB BLD-MCNC: 7.1 G/DL — LOW (ref 13–17)
HGB BLD-MCNC: 7.2 G/DL — LOW (ref 13–17)
HGB BLD-MCNC: 7.3 G/DL — LOW (ref 13–17)
HGB BLD-MCNC: 7.3 G/DL — LOW (ref 13–17)
HGB BLD-MCNC: 7.4 G/DL — LOW (ref 13–17)
HGB BLD-MCNC: 7.6 G/DL — LOW (ref 13–17)
HGB BLD-MCNC: 7.7 G/DL — LOW (ref 13–17)
HGB BLD-MCNC: 7.8 G/DL — LOW (ref 13–17)
HGB BLD-MCNC: 7.8 G/DL — LOW (ref 13–17)
HGB BLD-MCNC: 7.9 G/DL — LOW (ref 13–17)
HGB BLD-MCNC: 8 G/DL — LOW (ref 13–17)
HGB BLD-MCNC: 8.1 G/DL — LOW (ref 13–17)
HGB BLD-MCNC: 8.2 G/DL — LOW (ref 13–17)
HGB BLD-MCNC: 8.3 G/DL — LOW (ref 13–17)
HGB BLD-MCNC: 8.4 G/DL — LOW (ref 13–17)
HGB BLD-MCNC: 8.5 G/DL — LOW (ref 13–17)
HGB BLD-MCNC: 8.6 G/DL — LOW (ref 13–17)
HGB BLD-MCNC: 8.6 G/DL — LOW (ref 13–17)
HGB BLD-MCNC: 8.7 G/DL — LOW (ref 13–17)
HGB BLD-MCNC: 8.7 G/DL — LOW (ref 13–17)
HGB BLD-MCNC: 8.8 G/DL — LOW (ref 13–17)
HGB BLD-MCNC: 8.8 G/DL — LOW (ref 13–17)
HGB BLD-MCNC: 8.9 G/DL — LOW (ref 13–17)
HGB BLD-MCNC: 8.9 G/DL — LOW (ref 13–17)
HGB BLD-MCNC: 9 G/DL — LOW (ref 13–17)
HGB BLD-MCNC: 9.1 G/DL — LOW (ref 13–17)
HGB BLD-MCNC: 9.2 G/DL — LOW (ref 13–17)
HGB BLD-MCNC: 9.2 G/DL — LOW (ref 13–17)
HGB BLD-MCNC: 9.3 G/DL — LOW (ref 13–17)
HGB BLD-MCNC: 9.4 G/DL — LOW (ref 13–17)
HGB BLD-MCNC: 9.5 G/DL — LOW (ref 13–17)
HGB BLD-MCNC: 9.6 G/DL — LOW (ref 13–17)
HGB BLD-MCNC: 9.7 G/DL — LOW (ref 13–17)
HGB BLD-MCNC: 9.7 G/DL — LOW (ref 13–17)
HGB BLD-MCNC: 9.8 G/DL — LOW (ref 13–17)
HGB BLD-MCNC: 9.8 G/DL — LOW (ref 13–17)
HGB BLD-MCNC: 9.9 G/DL — LOW (ref 13–17)
HGB BLD-MCNC: 9.9 G/DL — LOW (ref 13–17)
HYALINE CASTS # UR AUTO: ABNORMAL /LPF (ref 0–2)
HYALINE CASTS # UR AUTO: SIGNIFICANT CHANGE UP /LPF (ref 0–2)
HYALINE CASTS # UR AUTO: SIGNIFICANT CHANGE UP /LPF (ref 0–2)
HYPOCHROMIA BLD QL: SIGNIFICANT CHANGE UP
HYPOCHROMIA BLD QL: SLIGHT — SIGNIFICANT CHANGE UP
IMM GRANULOCYTES NFR BLD AUTO: 0.3 % — SIGNIFICANT CHANGE UP (ref 0–0.9)
IMM GRANULOCYTES NFR BLD AUTO: 0.4 % — SIGNIFICANT CHANGE UP (ref 0–0.9)
IMM GRANULOCYTES NFR BLD AUTO: 0.5 % — SIGNIFICANT CHANGE UP (ref 0–0.9)
IMM GRANULOCYTES NFR BLD AUTO: 0.7 % — SIGNIFICANT CHANGE UP (ref 0–0.9)
IMM GRANULOCYTES NFR BLD AUTO: 0.8 % — SIGNIFICANT CHANGE UP (ref 0–0.9)
IMM GRANULOCYTES NFR BLD AUTO: 0.8 % — SIGNIFICANT CHANGE UP (ref 0–0.9)
IMM GRANULOCYTES NFR BLD AUTO: 0.9 % — SIGNIFICANT CHANGE UP (ref 0–0.9)
IMM GRANULOCYTES NFR BLD AUTO: 1.2 % — HIGH (ref 0–0.9)
INR BLD: 1.17 — HIGH (ref 0.88–1.16)
INR BLD: 1.22 — HIGH (ref 0.88–1.16)
INR BLD: 1.24 — HIGH (ref 0.88–1.16)
INR BLD: 1.24 — HIGH (ref 0.88–1.16)
INR BLD: 1.25 — HIGH (ref 0.88–1.16)
INR BLD: 1.26 — HIGH (ref 0.88–1.16)
INR BLD: 1.27 — HIGH (ref 0.88–1.16)
INR BLD: 1.29 — HIGH (ref 0.88–1.16)
INR BLD: 1.31 — HIGH (ref 0.88–1.16)
INR BLD: 1.32 — HIGH (ref 0.88–1.16)
INR BLD: 1.37 — HIGH (ref 0.88–1.16)
INR BLD: 1.37 — HIGH (ref 0.88–1.16)
INR BLD: 1.39 — HIGH (ref 0.88–1.16)
INR BLD: 1.39 — HIGH (ref 0.88–1.16)
INR BLD: 1.42 — HIGH (ref 0.88–1.16)
INR BLD: 1.43 — HIGH (ref 0.88–1.16)
INR BLD: 1.47 — HIGH (ref 0.88–1.16)
IRON SATN MFR SERPL: 13 % — LOW (ref 16–55)
IRON SATN MFR SERPL: 21 UG/DL — LOW (ref 45–165)
IRON SATN MFR SERPL: 22 UG/DL — LOW (ref 45–165)
IRON SATN MFR SERPL: 9 % — LOW (ref 16–55)
K OXYTOCA DNA BLD POS QL NAA+NON-PROBE: SIGNIFICANT CHANGE UP
KETONES UR-MCNC: ABNORMAL MG/DL
KETONES UR-MCNC: NEGATIVE — SIGNIFICANT CHANGE UP
KLEBSIELLA SP DNA BLD POS QL NAA+NON-PRB: SIGNIFICANT CHANGE UP
L MONOCYTOG DNA BLD POS QL NAA+NON-PROBE: SIGNIFICANT CHANGE UP
LACTATE SERPL-SCNC: 1.2 MMOL/L — SIGNIFICANT CHANGE UP (ref 0.5–2)
LACTATE SERPL-SCNC: 1.5 MMOL/L — SIGNIFICANT CHANGE UP (ref 0.5–2)
LACTATE SERPL-SCNC: 1.5 MMOL/L — SIGNIFICANT CHANGE UP (ref 0.5–2)
LACTATE SERPL-SCNC: 1.6 MMOL/L — SIGNIFICANT CHANGE UP (ref 0.5–2)
LACTATE SERPL-SCNC: 1.9 MMOL/L — SIGNIFICANT CHANGE UP (ref 0.5–2)
LACTATE SERPL-SCNC: 1.9 MMOL/L — SIGNIFICANT CHANGE UP (ref 0.5–2)
LACTATE SERPL-SCNC: 2 MMOL/L — SIGNIFICANT CHANGE UP (ref 0.5–2)
LACTATE SERPL-SCNC: 2.3 MMOL/L — HIGH (ref 0.5–2)
LACTATE SERPL-SCNC: 4.1 MMOL/L — CRITICAL HIGH (ref 0.5–2)
LEUKOCYTE ESTERASE UR-ACNC: ABNORMAL
LEUKOCYTE ESTERASE UR-ACNC: ABNORMAL
LEUKOCYTE ESTERASE UR-ACNC: NEGATIVE — SIGNIFICANT CHANGE UP
LIDOCAIN IGE QN: 30 U/L — SIGNIFICANT CHANGE UP (ref 7–60)
LYMPHOCYTES # BLD AUTO: 0.4 K/UL — LOW (ref 1–3.3)
LYMPHOCYTES # BLD AUTO: 0.72 K/UL — LOW (ref 1–3.3)
LYMPHOCYTES # BLD AUTO: 1.12 K/UL — SIGNIFICANT CHANGE UP (ref 1–3.3)
LYMPHOCYTES # BLD AUTO: 1.51 K/UL — SIGNIFICANT CHANGE UP (ref 1–3.3)
LYMPHOCYTES # BLD AUTO: 1.55 K/UL — SIGNIFICANT CHANGE UP (ref 1–3.3)
LYMPHOCYTES # BLD AUTO: 1.61 K/UL — SIGNIFICANT CHANGE UP (ref 1–3.3)
LYMPHOCYTES # BLD AUTO: 1.64 K/UL — SIGNIFICANT CHANGE UP (ref 1–3.3)
LYMPHOCYTES # BLD AUTO: 1.7 % — LOW (ref 13–44)
LYMPHOCYTES # BLD AUTO: 1.8 % — LOW (ref 13–44)
LYMPHOCYTES # BLD AUTO: 1.86 K/UL — SIGNIFICANT CHANGE UP (ref 1–3.3)
LYMPHOCYTES # BLD AUTO: 1.88 K/UL — SIGNIFICANT CHANGE UP (ref 1–3.3)
LYMPHOCYTES # BLD AUTO: 1.89 K/UL — SIGNIFICANT CHANGE UP (ref 1–3.3)
LYMPHOCYTES # BLD AUTO: 11.6 % — LOW (ref 13–44)
LYMPHOCYTES # BLD AUTO: 15 % — SIGNIFICANT CHANGE UP (ref 13–44)
LYMPHOCYTES # BLD AUTO: 16.1 % — SIGNIFICANT CHANGE UP (ref 13–44)
LYMPHOCYTES # BLD AUTO: 16.7 % — SIGNIFICANT CHANGE UP (ref 13–44)
LYMPHOCYTES # BLD AUTO: 17.1 % — SIGNIFICANT CHANGE UP (ref 13–44)
LYMPHOCYTES # BLD AUTO: 17.4 % — SIGNIFICANT CHANGE UP (ref 13–44)
LYMPHOCYTES # BLD AUTO: 17.8 % — SIGNIFICANT CHANGE UP (ref 13–44)
LYMPHOCYTES # BLD AUTO: 2.22 K/UL — SIGNIFICANT CHANGE UP (ref 1–3.3)
LYMPHOCYTES # BLD AUTO: 6.4 % — LOW (ref 13–44)
LYMPHOCYTES # BLD AUTO: 6.5 % — LOW (ref 13–44)
MACROCYTES BLD QL: SLIGHT — SIGNIFICANT CHANGE UP
MAGNESIUM SERPL-MCNC: 1.4 MG/DL — LOW (ref 1.6–2.6)
MAGNESIUM SERPL-MCNC: 1.5 MG/DL — LOW (ref 1.6–2.6)
MAGNESIUM SERPL-MCNC: 1.6 MG/DL — SIGNIFICANT CHANGE UP (ref 1.6–2.6)
MAGNESIUM SERPL-MCNC: 1.7 MG/DL — SIGNIFICANT CHANGE UP (ref 1.6–2.6)
MAGNESIUM SERPL-MCNC: 1.8 MG/DL — SIGNIFICANT CHANGE UP (ref 1.6–2.6)
MAGNESIUM SERPL-MCNC: 1.9 MG/DL — SIGNIFICANT CHANGE UP (ref 1.6–2.6)
MAGNESIUM SERPL-MCNC: 2 MG/DL — SIGNIFICANT CHANGE UP (ref 1.6–2.6)
MAGNESIUM SERPL-MCNC: 2.1 MG/DL — SIGNIFICANT CHANGE UP (ref 1.6–2.6)
MAGNESIUM SERPL-MCNC: 2.2 MG/DL — SIGNIFICANT CHANGE UP (ref 1.6–2.6)
MAGNESIUM SERPL-MCNC: 2.3 MG/DL — SIGNIFICANT CHANGE UP (ref 1.6–2.6)
MAGNESIUM SERPL-MCNC: 2.4 MG/DL — SIGNIFICANT CHANGE UP (ref 1.6–2.6)
MAGNESIUM SERPL-MCNC: 2.8 MG/DL — HIGH (ref 1.6–2.6)
MAGNESIUM SERPL-MCNC: SIGNIFICANT CHANGE UP MG/DL (ref 1.6–2.6)
MANUAL SMEAR VERIFICATION: SIGNIFICANT CHANGE UP
MCHC RBC-ENTMCNC: 27.6 PG — SIGNIFICANT CHANGE UP (ref 27–34)
MCHC RBC-ENTMCNC: 27.7 PG — SIGNIFICANT CHANGE UP (ref 27–34)
MCHC RBC-ENTMCNC: 27.8 PG — SIGNIFICANT CHANGE UP (ref 27–34)
MCHC RBC-ENTMCNC: 27.9 PG — SIGNIFICANT CHANGE UP (ref 27–34)
MCHC RBC-ENTMCNC: 28 PG — SIGNIFICANT CHANGE UP (ref 27–34)
MCHC RBC-ENTMCNC: 28.1 PG — SIGNIFICANT CHANGE UP (ref 27–34)
MCHC RBC-ENTMCNC: 28.2 PG — SIGNIFICANT CHANGE UP (ref 27–34)
MCHC RBC-ENTMCNC: 28.3 PG — SIGNIFICANT CHANGE UP (ref 27–34)
MCHC RBC-ENTMCNC: 28.4 PG — SIGNIFICANT CHANGE UP (ref 27–34)
MCHC RBC-ENTMCNC: 28.5 GM/DL — LOW (ref 32–36)
MCHC RBC-ENTMCNC: 28.5 PG — SIGNIFICANT CHANGE UP (ref 27–34)
MCHC RBC-ENTMCNC: 28.6 PG — SIGNIFICANT CHANGE UP (ref 27–34)
MCHC RBC-ENTMCNC: 28.7 PG — SIGNIFICANT CHANGE UP (ref 27–34)
MCHC RBC-ENTMCNC: 28.8 PG — SIGNIFICANT CHANGE UP (ref 27–34)
MCHC RBC-ENTMCNC: 28.9 PG — SIGNIFICANT CHANGE UP (ref 27–34)
MCHC RBC-ENTMCNC: 29 PG — SIGNIFICANT CHANGE UP (ref 27–34)
MCHC RBC-ENTMCNC: 29.1 PG — SIGNIFICANT CHANGE UP (ref 27–34)
MCHC RBC-ENTMCNC: 29.1 PG — SIGNIFICANT CHANGE UP (ref 27–34)
MCHC RBC-ENTMCNC: 29.2 PG — SIGNIFICANT CHANGE UP (ref 27–34)
MCHC RBC-ENTMCNC: 29.3 PG — SIGNIFICANT CHANGE UP (ref 27–34)
MCHC RBC-ENTMCNC: 29.3 PG — SIGNIFICANT CHANGE UP (ref 27–34)
MCHC RBC-ENTMCNC: 29.4 PG — SIGNIFICANT CHANGE UP (ref 27–34)
MCHC RBC-ENTMCNC: 29.4 PG — SIGNIFICANT CHANGE UP (ref 27–34)
MCHC RBC-ENTMCNC: 29.6 PG — SIGNIFICANT CHANGE UP (ref 27–34)
MCHC RBC-ENTMCNC: 29.7 PG — SIGNIFICANT CHANGE UP (ref 27–34)
MCHC RBC-ENTMCNC: 29.8 PG — SIGNIFICANT CHANGE UP (ref 27–34)
MCHC RBC-ENTMCNC: 29.9 PG — SIGNIFICANT CHANGE UP (ref 27–34)
MCHC RBC-ENTMCNC: 30 GM/DL — LOW (ref 32–36)
MCHC RBC-ENTMCNC: 30 GM/DL — LOW (ref 32–36)
MCHC RBC-ENTMCNC: 30.5 GM/DL — LOW (ref 32–36)
MCHC RBC-ENTMCNC: 30.5 GM/DL — LOW (ref 32–36)
MCHC RBC-ENTMCNC: 30.6 GM/DL — LOW (ref 32–36)
MCHC RBC-ENTMCNC: 30.8 GM/DL — LOW (ref 32–36)
MCHC RBC-ENTMCNC: 30.9 GM/DL — LOW (ref 32–36)
MCHC RBC-ENTMCNC: 31 GM/DL — LOW (ref 32–36)
MCHC RBC-ENTMCNC: 31.1 GM/DL — LOW (ref 32–36)
MCHC RBC-ENTMCNC: 31.2 GM/DL — LOW (ref 32–36)
MCHC RBC-ENTMCNC: 31.2 GM/DL — LOW (ref 32–36)
MCHC RBC-ENTMCNC: 31.3 GM/DL — LOW (ref 32–36)
MCHC RBC-ENTMCNC: 31.4 GM/DL — LOW (ref 32–36)
MCHC RBC-ENTMCNC: 31.5 GM/DL — LOW (ref 32–36)
MCHC RBC-ENTMCNC: 31.6 GM/DL — LOW (ref 32–36)
MCHC RBC-ENTMCNC: 31.7 GM/DL — LOW (ref 32–36)
MCHC RBC-ENTMCNC: 31.7 PG — SIGNIFICANT CHANGE UP (ref 27–34)
MCHC RBC-ENTMCNC: 31.8 PG — SIGNIFICANT CHANGE UP (ref 27–34)
MCHC RBC-ENTMCNC: 31.9 GM/DL — LOW (ref 32–36)
MCHC RBC-ENTMCNC: 32 GM/DL — SIGNIFICANT CHANGE UP (ref 32–36)
MCHC RBC-ENTMCNC: 32.1 GM/DL — SIGNIFICANT CHANGE UP (ref 32–36)
MCHC RBC-ENTMCNC: 32.2 GM/DL — SIGNIFICANT CHANGE UP (ref 32–36)
MCHC RBC-ENTMCNC: 32.3 GM/DL — SIGNIFICANT CHANGE UP (ref 32–36)
MCHC RBC-ENTMCNC: 32.3 GM/DL — SIGNIFICANT CHANGE UP (ref 32–36)
MCHC RBC-ENTMCNC: 32.4 GM/DL — SIGNIFICANT CHANGE UP (ref 32–36)
MCHC RBC-ENTMCNC: 32.5 GM/DL — SIGNIFICANT CHANGE UP (ref 32–36)
MCHC RBC-ENTMCNC: 32.6 GM/DL — SIGNIFICANT CHANGE UP (ref 32–36)
MCHC RBC-ENTMCNC: 32.7 GM/DL — SIGNIFICANT CHANGE UP (ref 32–36)
MCHC RBC-ENTMCNC: 32.8 GM/DL — SIGNIFICANT CHANGE UP (ref 32–36)
MCHC RBC-ENTMCNC: 32.9 GM/DL — SIGNIFICANT CHANGE UP (ref 32–36)
MCHC RBC-ENTMCNC: 33 GM/DL — SIGNIFICANT CHANGE UP (ref 32–36)
MCHC RBC-ENTMCNC: 33.1 GM/DL — SIGNIFICANT CHANGE UP (ref 32–36)
MCHC RBC-ENTMCNC: 33.2 GM/DL — SIGNIFICANT CHANGE UP (ref 32–36)
MCHC RBC-ENTMCNC: 33.3 GM/DL — SIGNIFICANT CHANGE UP (ref 32–36)
MCHC RBC-ENTMCNC: 33.9 GM/DL — SIGNIFICANT CHANGE UP (ref 32–36)
MCHC RBC-ENTMCNC: 34.5 GM/DL — SIGNIFICANT CHANGE UP (ref 32–36)
MCHC RBC-ENTMCNC: 35.2 GM/DL — SIGNIFICANT CHANGE UP (ref 32–36)
MCV RBC AUTO: 104.2 FL — HIGH (ref 80–100)
MCV RBC AUTO: 85.3 FL — SIGNIFICANT CHANGE UP (ref 80–100)
MCV RBC AUTO: 85.4 FL — SIGNIFICANT CHANGE UP (ref 80–100)
MCV RBC AUTO: 85.4 FL — SIGNIFICANT CHANGE UP (ref 80–100)
MCV RBC AUTO: 85.6 FL — SIGNIFICANT CHANGE UP (ref 80–100)
MCV RBC AUTO: 85.9 FL — SIGNIFICANT CHANGE UP (ref 80–100)
MCV RBC AUTO: 85.9 FL — SIGNIFICANT CHANGE UP (ref 80–100)
MCV RBC AUTO: 86 FL — SIGNIFICANT CHANGE UP (ref 80–100)
MCV RBC AUTO: 86.4 FL — SIGNIFICANT CHANGE UP (ref 80–100)
MCV RBC AUTO: 86.6 FL — SIGNIFICANT CHANGE UP (ref 80–100)
MCV RBC AUTO: 86.8 FL — SIGNIFICANT CHANGE UP (ref 80–100)
MCV RBC AUTO: 86.9 FL — SIGNIFICANT CHANGE UP (ref 80–100)
MCV RBC AUTO: 87.1 FL — SIGNIFICANT CHANGE UP (ref 80–100)
MCV RBC AUTO: 87.1 FL — SIGNIFICANT CHANGE UP (ref 80–100)
MCV RBC AUTO: 87.3 FL — SIGNIFICANT CHANGE UP (ref 80–100)
MCV RBC AUTO: 87.3 FL — SIGNIFICANT CHANGE UP (ref 80–100)
MCV RBC AUTO: 87.4 FL — SIGNIFICANT CHANGE UP (ref 80–100)
MCV RBC AUTO: 87.5 FL — SIGNIFICANT CHANGE UP (ref 80–100)
MCV RBC AUTO: 87.5 FL — SIGNIFICANT CHANGE UP (ref 80–100)
MCV RBC AUTO: 87.6 FL — SIGNIFICANT CHANGE UP (ref 80–100)
MCV RBC AUTO: 87.8 FL — SIGNIFICANT CHANGE UP (ref 80–100)
MCV RBC AUTO: 87.8 FL — SIGNIFICANT CHANGE UP (ref 80–100)
MCV RBC AUTO: 87.9 FL — SIGNIFICANT CHANGE UP (ref 80–100)
MCV RBC AUTO: 87.9 FL — SIGNIFICANT CHANGE UP (ref 80–100)
MCV RBC AUTO: 88 FL — SIGNIFICANT CHANGE UP (ref 80–100)
MCV RBC AUTO: 88 FL — SIGNIFICANT CHANGE UP (ref 80–100)
MCV RBC AUTO: 88.1 FL — SIGNIFICANT CHANGE UP (ref 80–100)
MCV RBC AUTO: 88.1 FL — SIGNIFICANT CHANGE UP (ref 80–100)
MCV RBC AUTO: 88.2 FL — SIGNIFICANT CHANGE UP (ref 80–100)
MCV RBC AUTO: 88.3 FL — SIGNIFICANT CHANGE UP (ref 80–100)
MCV RBC AUTO: 88.4 FL — SIGNIFICANT CHANGE UP (ref 80–100)
MCV RBC AUTO: 88.4 FL — SIGNIFICANT CHANGE UP (ref 80–100)
MCV RBC AUTO: 88.5 FL — SIGNIFICANT CHANGE UP (ref 80–100)
MCV RBC AUTO: 88.6 FL — SIGNIFICANT CHANGE UP (ref 80–100)
MCV RBC AUTO: 88.8 FL — SIGNIFICANT CHANGE UP (ref 80–100)
MCV RBC AUTO: 88.9 FL — SIGNIFICANT CHANGE UP (ref 80–100)
MCV RBC AUTO: 89 FL — SIGNIFICANT CHANGE UP (ref 80–100)
MCV RBC AUTO: 89.1 FL — SIGNIFICANT CHANGE UP (ref 80–100)
MCV RBC AUTO: 89.2 FL — SIGNIFICANT CHANGE UP (ref 80–100)
MCV RBC AUTO: 89.3 FL — SIGNIFICANT CHANGE UP (ref 80–100)
MCV RBC AUTO: 89.4 FL — SIGNIFICANT CHANGE UP (ref 80–100)
MCV RBC AUTO: 89.5 FL — SIGNIFICANT CHANGE UP (ref 80–100)
MCV RBC AUTO: 89.6 FL — SIGNIFICANT CHANGE UP (ref 80–100)
MCV RBC AUTO: 89.7 FL — SIGNIFICANT CHANGE UP (ref 80–100)
MCV RBC AUTO: 89.8 FL — SIGNIFICANT CHANGE UP (ref 80–100)
MCV RBC AUTO: 89.8 FL — SIGNIFICANT CHANGE UP (ref 80–100)
MCV RBC AUTO: 89.9 FL — SIGNIFICANT CHANGE UP (ref 80–100)
MCV RBC AUTO: 90 FL — SIGNIFICANT CHANGE UP (ref 80–100)
MCV RBC AUTO: 90.1 FL — SIGNIFICANT CHANGE UP (ref 80–100)
MCV RBC AUTO: 90.2 FL — SIGNIFICANT CHANGE UP (ref 80–100)
MCV RBC AUTO: 90.2 FL — SIGNIFICANT CHANGE UP (ref 80–100)
MCV RBC AUTO: 90.3 FL — SIGNIFICANT CHANGE UP (ref 80–100)
MCV RBC AUTO: 90.4 FL — SIGNIFICANT CHANGE UP (ref 80–100)
MCV RBC AUTO: 90.4 FL — SIGNIFICANT CHANGE UP (ref 80–100)
MCV RBC AUTO: 90.5 FL — SIGNIFICANT CHANGE UP (ref 80–100)
MCV RBC AUTO: 90.7 FL — SIGNIFICANT CHANGE UP (ref 80–100)
MCV RBC AUTO: 90.8 FL — SIGNIFICANT CHANGE UP (ref 80–100)
MCV RBC AUTO: 90.9 FL — SIGNIFICANT CHANGE UP (ref 80–100)
MCV RBC AUTO: 90.9 FL — SIGNIFICANT CHANGE UP (ref 80–100)
MCV RBC AUTO: 91.1 FL — SIGNIFICANT CHANGE UP (ref 80–100)
MCV RBC AUTO: 91.2 FL — SIGNIFICANT CHANGE UP (ref 80–100)
MCV RBC AUTO: 91.3 FL — SIGNIFICANT CHANGE UP (ref 80–100)
MCV RBC AUTO: 91.7 FL — SIGNIFICANT CHANGE UP (ref 80–100)
MCV RBC AUTO: 91.8 FL — SIGNIFICANT CHANGE UP (ref 80–100)
MCV RBC AUTO: 91.8 FL — SIGNIFICANT CHANGE UP (ref 80–100)
MCV RBC AUTO: 92.7 FL — SIGNIFICANT CHANGE UP (ref 80–100)
MCV RBC AUTO: 92.7 FL — SIGNIFICANT CHANGE UP (ref 80–100)
MCV RBC AUTO: 92.9 FL — SIGNIFICANT CHANGE UP (ref 80–100)
MCV RBC AUTO: 93.2 FL — SIGNIFICANT CHANGE UP (ref 80–100)
MCV RBC AUTO: 94.4 FL — SIGNIFICANT CHANGE UP (ref 80–100)
MCV RBC AUTO: 94.5 FL — SIGNIFICANT CHANGE UP (ref 80–100)
MCV RBC AUTO: 98.4 FL — SIGNIFICANT CHANGE UP (ref 80–100)
METHADONE UR-MCNC: NEGATIVE — SIGNIFICANT CHANGE UP
METHOD TYPE: SIGNIFICANT CHANGE UP
MICROCYTES BLD QL: SLIGHT — SIGNIFICANT CHANGE UP
MICROCYTES BLD QL: SLIGHT — SIGNIFICANT CHANGE UP
MONOCYTES # BLD AUTO: 0.36 K/UL — SIGNIFICANT CHANGE UP (ref 0–0.9)
MONOCYTES # BLD AUTO: 0.45 K/UL — SIGNIFICANT CHANGE UP (ref 0–0.9)
MONOCYTES # BLD AUTO: 0.53 K/UL — SIGNIFICANT CHANGE UP (ref 0–0.9)
MONOCYTES # BLD AUTO: 0.67 K/UL — SIGNIFICANT CHANGE UP (ref 0–0.9)
MONOCYTES # BLD AUTO: 0.72 K/UL — SIGNIFICANT CHANGE UP (ref 0–0.9)
MONOCYTES # BLD AUTO: 0.8 K/UL — SIGNIFICANT CHANGE UP (ref 0–0.9)
MONOCYTES # BLD AUTO: 0.9 K/UL — SIGNIFICANT CHANGE UP (ref 0–0.9)
MONOCYTES # BLD AUTO: 0.96 K/UL — HIGH (ref 0–0.9)
MONOCYTES # BLD AUTO: 1.27 K/UL — HIGH (ref 0–0.9)
MONOCYTES # BLD AUTO: 1.54 K/UL — HIGH (ref 0–0.9)
MONOCYTES # BLD AUTO: 1.62 K/UL — HIGH (ref 0–0.9)
MONOCYTES NFR BLD AUTO: 0.9 % — LOW (ref 2–14)
MONOCYTES NFR BLD AUTO: 4.5 % — SIGNIFICANT CHANGE UP (ref 2–14)
MONOCYTES NFR BLD AUTO: 5.3 % — SIGNIFICANT CHANGE UP (ref 2–14)
MONOCYTES NFR BLD AUTO: 5.6 % — SIGNIFICANT CHANGE UP (ref 2–14)
MONOCYTES NFR BLD AUTO: 5.9 % — SIGNIFICANT CHANGE UP (ref 2–14)
MONOCYTES NFR BLD AUTO: 5.9 % — SIGNIFICANT CHANGE UP (ref 2–14)
MONOCYTES NFR BLD AUTO: 6 % — SIGNIFICANT CHANGE UP (ref 2–14)
MONOCYTES NFR BLD AUTO: 6.9 % — SIGNIFICANT CHANGE UP (ref 2–14)
MONOCYTES NFR BLD AUTO: 7.4 % — SIGNIFICANT CHANGE UP (ref 2–14)
MONOCYTES NFR BLD AUTO: 8.7 % — SIGNIFICANT CHANGE UP (ref 2–14)
MONOCYTES NFR BLD AUTO: 8.8 % — SIGNIFICANT CHANGE UP (ref 2–14)
MRSA SPEC QL CULT: SIGNIFICANT CHANGE UP
MSSA DNA SPEC QL NAA+PROBE: SIGNIFICANT CHANGE UP
N MEN ISLT CULT: SIGNIFICANT CHANGE UP
NEUTROPHILS # BLD AUTO: 10.66 K/UL — HIGH (ref 1.8–7.4)
NEUTROPHILS # BLD AUTO: 14.53 K/UL — HIGH (ref 1.8–7.4)
NEUTROPHILS # BLD AUTO: 21.2 K/UL — HIGH (ref 1.8–7.4)
NEUTROPHILS # BLD AUTO: 25.05 K/UL — HIGH (ref 1.8–7.4)
NEUTROPHILS # BLD AUTO: 38.96 K/UL — HIGH (ref 1.8–7.4)
NEUTROPHILS # BLD AUTO: 6.62 K/UL — SIGNIFICANT CHANGE UP (ref 1.8–7.4)
NEUTROPHILS # BLD AUTO: 7.4 K/UL — SIGNIFICANT CHANGE UP (ref 1.8–7.4)
NEUTROPHILS # BLD AUTO: 7.77 K/UL — HIGH (ref 1.8–7.4)
NEUTROPHILS # BLD AUTO: 7.93 K/UL — HIGH (ref 1.8–7.4)
NEUTROPHILS # BLD AUTO: 8.41 K/UL — HIGH (ref 1.8–7.4)
NEUTROPHILS # BLD AUTO: 9.56 K/UL — HIGH (ref 1.8–7.4)
NEUTROPHILS NFR BLD AUTO: 72.3 % — SIGNIFICANT CHANGE UP (ref 43–77)
NEUTROPHILS NFR BLD AUTO: 72.4 % — SIGNIFICANT CHANGE UP (ref 43–77)
NEUTROPHILS NFR BLD AUTO: 73.3 % — SIGNIFICANT CHANGE UP (ref 43–77)
NEUTROPHILS NFR BLD AUTO: 74.4 % — SIGNIFICANT CHANGE UP (ref 43–77)
NEUTROPHILS NFR BLD AUTO: 74.8 % — SIGNIFICANT CHANGE UP (ref 43–77)
NEUTROPHILS NFR BLD AUTO: 77.4 % — HIGH (ref 43–77)
NEUTROPHILS NFR BLD AUTO: 80 % — HIGH (ref 43–77)
NEUTROPHILS NFR BLD AUTO: 84.9 % — HIGH (ref 43–77)
NEUTROPHILS NFR BLD AUTO: 85.1 % — HIGH (ref 43–77)
NEUTROPHILS NFR BLD AUTO: 89.6 % — HIGH (ref 43–77)
NEUTROPHILS NFR BLD AUTO: 97.3 % — HIGH (ref 43–77)
NEUTS BAND # BLD: 0.9 % — SIGNIFICANT CHANGE UP (ref 0–8)
NEUTS BAND # BLD: 1.1 % — SIGNIFICANT CHANGE UP (ref 0–8)
NITRITE UR-MCNC: NEGATIVE — SIGNIFICANT CHANGE UP
NRBC # BLD: 0 /100 WBCS — SIGNIFICANT CHANGE UP (ref 0–0)
NT-PROBNP SERPL-SCNC: 349 PG/ML — HIGH (ref 0–300)
OPIATES UR-MCNC: POSITIVE
ORGANISM # SPEC MICROSCOPIC CNT: SIGNIFICANT CHANGE UP
OSMOLALITY SERPL: 277 MOSM/KG — LOW (ref 280–301)
OSMOLALITY SERPL: 284 MOSM/KG — SIGNIFICANT CHANGE UP (ref 280–301)
OSMOLALITY UR: 341 MOSM/KG — SIGNIFICANT CHANGE UP (ref 300–900)
OSMOLALITY UR: 360 MOSM/KG — SIGNIFICANT CHANGE UP (ref 300–900)
OSMOLALITY UR: 483 MOSM/KG — SIGNIFICANT CHANGE UP (ref 300–900)
OSMOLALITY UR: 687 MOSM/KG — SIGNIFICANT CHANGE UP (ref 300–900)
OVALOCYTES BLD QL SMEAR: SLIGHT — SIGNIFICANT CHANGE UP
P AERUGINOSA DNA BLD POS NAA+NON-PROBE: SIGNIFICANT CHANGE UP
PCO2 BLDA: 26 MMHG — LOW (ref 35–48)
PCP SPEC-MCNC: SIGNIFICANT CHANGE UP
PCP UR-MCNC: NEGATIVE — SIGNIFICANT CHANGE UP
PH BLDA: 7.54 — HIGH (ref 7.35–7.45)
PH UR: 5 — SIGNIFICANT CHANGE UP (ref 5–8)
PH UR: 5 — SIGNIFICANT CHANGE UP (ref 5–8)
PH UR: 5.5 — SIGNIFICANT CHANGE UP (ref 5–8)
PH UR: 6 — SIGNIFICANT CHANGE UP (ref 5–8)
PH UR: 6.5 — SIGNIFICANT CHANGE UP (ref 5–8)
PH UR: 7 — SIGNIFICANT CHANGE UP (ref 5–8)
PH UR: 7 — SIGNIFICANT CHANGE UP (ref 5–8)
PHOSPHATE 24H UR-MCNC: 9.8 MG/DL — SIGNIFICANT CHANGE UP
PHOSPHATE SERPL-MCNC: 1.7 MG/DL — LOW (ref 2.5–4.5)
PHOSPHATE SERPL-MCNC: 2 MG/DL — LOW (ref 2.5–4.5)
PHOSPHATE SERPL-MCNC: 2.1 MG/DL — LOW (ref 2.5–4.5)
PHOSPHATE SERPL-MCNC: 2.2 MG/DL — LOW (ref 2.5–4.5)
PHOSPHATE SERPL-MCNC: 2.3 MG/DL — LOW (ref 2.5–4.5)
PHOSPHATE SERPL-MCNC: 2.4 MG/DL — LOW (ref 2.5–4.5)
PHOSPHATE SERPL-MCNC: 2.6 MG/DL — SIGNIFICANT CHANGE UP (ref 2.5–4.5)
PHOSPHATE SERPL-MCNC: 2.7 MG/DL — SIGNIFICANT CHANGE UP (ref 2.5–4.5)
PHOSPHATE SERPL-MCNC: 2.8 MG/DL — SIGNIFICANT CHANGE UP (ref 2.5–4.5)
PHOSPHATE SERPL-MCNC: 2.9 MG/DL — SIGNIFICANT CHANGE UP (ref 2.5–4.5)
PHOSPHATE SERPL-MCNC: 3 MG/DL — SIGNIFICANT CHANGE UP (ref 2.5–4.5)
PHOSPHATE SERPL-MCNC: 3.1 MG/DL — SIGNIFICANT CHANGE UP (ref 2.5–4.5)
PHOSPHATE SERPL-MCNC: 3.2 MG/DL — SIGNIFICANT CHANGE UP (ref 2.5–4.5)
PHOSPHATE SERPL-MCNC: 3.3 MG/DL — SIGNIFICANT CHANGE UP (ref 2.5–4.5)
PHOSPHATE SERPL-MCNC: 3.4 MG/DL — SIGNIFICANT CHANGE UP (ref 2.5–4.5)
PHOSPHATE SERPL-MCNC: 3.5 MG/DL — SIGNIFICANT CHANGE UP (ref 2.5–4.5)
PHOSPHATE SERPL-MCNC: 3.6 MG/DL — SIGNIFICANT CHANGE UP (ref 2.5–4.5)
PHOSPHATE SERPL-MCNC: 3.7 MG/DL — SIGNIFICANT CHANGE UP (ref 2.5–4.5)
PHOSPHATE SERPL-MCNC: 3.7 MG/DL — SIGNIFICANT CHANGE UP (ref 2.5–4.5)
PHOSPHATE SERPL-MCNC: 3.8 MG/DL — SIGNIFICANT CHANGE UP (ref 2.5–4.5)
PHOSPHATE SERPL-MCNC: 3.9 MG/DL — SIGNIFICANT CHANGE UP (ref 2.5–4.5)
PHOSPHATE SERPL-MCNC: 4 MG/DL — SIGNIFICANT CHANGE UP (ref 2.5–4.5)
PHOSPHATE SERPL-MCNC: 4.1 MG/DL — SIGNIFICANT CHANGE UP (ref 2.5–4.5)
PHOSPHATE SERPL-MCNC: 4.2 MG/DL — SIGNIFICANT CHANGE UP (ref 2.5–4.5)
PHOSPHATE SERPL-MCNC: 4.2 MG/DL — SIGNIFICANT CHANGE UP (ref 2.5–4.5)
PHOSPHATE SERPL-MCNC: 4.3 MG/DL — SIGNIFICANT CHANGE UP (ref 2.5–4.5)
PHOSPHATE SERPL-MCNC: 4.4 MG/DL — SIGNIFICANT CHANGE UP (ref 2.5–4.5)
PHOSPHATE SERPL-MCNC: 4.7 MG/DL — HIGH (ref 2.5–4.5)
PHOSPHATE SERPL-MCNC: 4.8 MG/DL — HIGH (ref 2.5–4.5)
PHOSPHATE SERPL-MCNC: SIGNIFICANT CHANGE UP MG/DL (ref 2.5–4.5)
PLAT MORPH BLD: ABNORMAL
PLATELET # BLD AUTO: 185 K/UL — SIGNIFICANT CHANGE UP (ref 150–400)
PLATELET # BLD AUTO: 252 K/UL — SIGNIFICANT CHANGE UP (ref 150–400)
PLATELET # BLD AUTO: 312 K/UL — SIGNIFICANT CHANGE UP (ref 150–400)
PLATELET # BLD AUTO: 317 K/UL — SIGNIFICANT CHANGE UP (ref 150–400)
PLATELET # BLD AUTO: 327 K/UL — SIGNIFICANT CHANGE UP (ref 150–400)
PLATELET # BLD AUTO: 331 K/UL — SIGNIFICANT CHANGE UP (ref 150–400)
PLATELET # BLD AUTO: 335 K/UL — SIGNIFICANT CHANGE UP (ref 150–400)
PLATELET # BLD AUTO: 341 K/UL — SIGNIFICANT CHANGE UP (ref 150–400)
PLATELET # BLD AUTO: 347 K/UL — SIGNIFICANT CHANGE UP (ref 150–400)
PLATELET # BLD AUTO: 348 K/UL — SIGNIFICANT CHANGE UP (ref 150–400)
PLATELET # BLD AUTO: 358 K/UL — SIGNIFICANT CHANGE UP (ref 150–400)
PLATELET # BLD AUTO: 366 K/UL — SIGNIFICANT CHANGE UP (ref 150–400)
PLATELET # BLD AUTO: 370 K/UL — SIGNIFICANT CHANGE UP (ref 150–400)
PLATELET # BLD AUTO: 378 K/UL — SIGNIFICANT CHANGE UP (ref 150–400)
PLATELET # BLD AUTO: 379 K/UL — SIGNIFICANT CHANGE UP (ref 150–400)
PLATELET # BLD AUTO: 384 K/UL — SIGNIFICANT CHANGE UP (ref 150–400)
PLATELET # BLD AUTO: 385 K/UL — SIGNIFICANT CHANGE UP (ref 150–400)
PLATELET # BLD AUTO: 388 K/UL — SIGNIFICANT CHANGE UP (ref 150–400)
PLATELET # BLD AUTO: 391 K/UL — SIGNIFICANT CHANGE UP (ref 150–400)
PLATELET # BLD AUTO: 400 K/UL — SIGNIFICANT CHANGE UP (ref 150–400)
PLATELET # BLD AUTO: 401 K/UL — HIGH (ref 150–400)
PLATELET # BLD AUTO: 402 K/UL — HIGH (ref 150–400)
PLATELET # BLD AUTO: 403 K/UL — HIGH (ref 150–400)
PLATELET # BLD AUTO: 411 K/UL — HIGH (ref 150–400)
PLATELET # BLD AUTO: 417 K/UL — HIGH (ref 150–400)
PLATELET # BLD AUTO: 421 K/UL — HIGH (ref 150–400)
PLATELET # BLD AUTO: 421 K/UL — HIGH (ref 150–400)
PLATELET # BLD AUTO: 423 K/UL — HIGH (ref 150–400)
PLATELET # BLD AUTO: 428 K/UL — HIGH (ref 150–400)
PLATELET # BLD AUTO: 432 K/UL — HIGH (ref 150–400)
PLATELET # BLD AUTO: 444 K/UL — HIGH (ref 150–400)
PLATELET # BLD AUTO: 450 K/UL — HIGH (ref 150–400)
PLATELET # BLD AUTO: 454 K/UL — HIGH (ref 150–400)
PLATELET # BLD AUTO: 455 K/UL — HIGH (ref 150–400)
PLATELET # BLD AUTO: 455 K/UL — HIGH (ref 150–400)
PLATELET # BLD AUTO: 456 K/UL — HIGH (ref 150–400)
PLATELET # BLD AUTO: 457 K/UL — HIGH (ref 150–400)
PLATELET # BLD AUTO: 459 K/UL — HIGH (ref 150–400)
PLATELET # BLD AUTO: 462 K/UL — HIGH (ref 150–400)
PLATELET # BLD AUTO: 465 K/UL — HIGH (ref 150–400)
PLATELET # BLD AUTO: 469 K/UL — HIGH (ref 150–400)
PLATELET # BLD AUTO: 471 K/UL — HIGH (ref 150–400)
PLATELET # BLD AUTO: 472 K/UL — HIGH (ref 150–400)
PLATELET # BLD AUTO: 473 K/UL — HIGH (ref 150–400)
PLATELET # BLD AUTO: 476 K/UL — HIGH (ref 150–400)
PLATELET # BLD AUTO: 476 K/UL — HIGH (ref 150–400)
PLATELET # BLD AUTO: 477 K/UL — HIGH (ref 150–400)
PLATELET # BLD AUTO: 478 K/UL — HIGH (ref 150–400)
PLATELET # BLD AUTO: 482 K/UL — HIGH (ref 150–400)
PLATELET # BLD AUTO: 484 K/UL — HIGH (ref 150–400)
PLATELET # BLD AUTO: 486 K/UL — HIGH (ref 150–400)
PLATELET # BLD AUTO: 487 K/UL — HIGH (ref 150–400)
PLATELET # BLD AUTO: 487 K/UL — HIGH (ref 150–400)
PLATELET # BLD AUTO: 490 K/UL — HIGH (ref 150–400)
PLATELET # BLD AUTO: 502 K/UL — HIGH (ref 150–400)
PLATELET # BLD AUTO: 509 K/UL — HIGH (ref 150–400)
PLATELET # BLD AUTO: 510 K/UL — HIGH (ref 150–400)
PLATELET # BLD AUTO: 519 K/UL — HIGH (ref 150–400)
PLATELET # BLD AUTO: 529 K/UL — HIGH (ref 150–400)
PLATELET # BLD AUTO: 531 K/UL — HIGH (ref 150–400)
PLATELET # BLD AUTO: 533 K/UL — HIGH (ref 150–400)
PLATELET # BLD AUTO: 533 K/UL — HIGH (ref 150–400)
PLATELET # BLD AUTO: 534 K/UL — HIGH (ref 150–400)
PLATELET # BLD AUTO: 534 K/UL — HIGH (ref 150–400)
PLATELET # BLD AUTO: 535 K/UL — HIGH (ref 150–400)
PLATELET # BLD AUTO: 537 K/UL — HIGH (ref 150–400)
PLATELET # BLD AUTO: 540 K/UL — HIGH (ref 150–400)
PLATELET # BLD AUTO: 540 K/UL — HIGH (ref 150–400)
PLATELET # BLD AUTO: 564 K/UL — HIGH (ref 150–400)
PLATELET # BLD AUTO: 574 K/UL — HIGH (ref 150–400)
PLATELET # BLD AUTO: 577 K/UL — HIGH (ref 150–400)
PLATELET # BLD AUTO: 578 K/UL — HIGH (ref 150–400)
PLATELET # BLD AUTO: 579 K/UL — HIGH (ref 150–400)
PLATELET # BLD AUTO: 581 K/UL — HIGH (ref 150–400)
PLATELET # BLD AUTO: 582 K/UL — HIGH (ref 150–400)
PLATELET # BLD AUTO: 587 K/UL — HIGH (ref 150–400)
PLATELET # BLD AUTO: 590 K/UL — HIGH (ref 150–400)
PLATELET # BLD AUTO: 594 K/UL — HIGH (ref 150–400)
PLATELET # BLD AUTO: 601 K/UL — HIGH (ref 150–400)
PLATELET # BLD AUTO: 604 K/UL — HIGH (ref 150–400)
PLATELET # BLD AUTO: 614 K/UL — HIGH (ref 150–400)
PLATELET # BLD AUTO: 618 K/UL — HIGH (ref 150–400)
PLATELET # BLD AUTO: 626 K/UL — HIGH (ref 150–400)
PLATELET # BLD AUTO: 627 K/UL — HIGH (ref 150–400)
PLATELET # BLD AUTO: 650 K/UL — HIGH (ref 150–400)
PLATELET # BLD AUTO: 651 K/UL — HIGH (ref 150–400)
PLATELET # BLD AUTO: 654 K/UL — HIGH (ref 150–400)
PLATELET # BLD AUTO: 661 K/UL — HIGH (ref 150–400)
PLATELET # BLD AUTO: 675 K/UL — HIGH (ref 150–400)
PLATELET # BLD AUTO: 682 K/UL — HIGH (ref 150–400)
PLATELET # BLD AUTO: 687 K/UL — HIGH (ref 150–400)
PLATELET # BLD AUTO: 689 K/UL — HIGH (ref 150–400)
PLATELET # BLD AUTO: 694 K/UL — HIGH (ref 150–400)
PLATELET # BLD AUTO: 709 K/UL — HIGH (ref 150–400)
PLATELET # BLD AUTO: 713 K/UL — HIGH (ref 150–400)
PLATELET # BLD AUTO: 713 K/UL — HIGH (ref 150–400)
PLATELET # BLD AUTO: 718 K/UL — HIGH (ref 150–400)
PLATELET # BLD AUTO: 722 K/UL — HIGH (ref 150–400)
PLATELET # BLD AUTO: 752 K/UL — HIGH (ref 150–400)
PLATELET # BLD AUTO: 770 K/UL — HIGH (ref 150–400)
PLATELET # BLD AUTO: 771 K/UL — HIGH (ref 150–400)
PLATELET # BLD AUTO: 871 K/UL — HIGH (ref 150–400)
PO2 BLDA: 201 MMHG — HIGH (ref 83–108)
POIKILOCYTOSIS BLD QL AUTO: SLIGHT — SIGNIFICANT CHANGE UP
POIKILOCYTOSIS BLD QL AUTO: SLIGHT — SIGNIFICANT CHANGE UP
POLYCHROMASIA BLD QL SMEAR: SLIGHT — SIGNIFICANT CHANGE UP
POTASSIUM SERPL-MCNC: 3.1 MMOL/L — LOW (ref 3.5–5.3)
POTASSIUM SERPL-MCNC: 3.2 MMOL/L — LOW (ref 3.5–5.3)
POTASSIUM SERPL-MCNC: 3.3 MMOL/L — LOW (ref 3.5–5.3)
POTASSIUM SERPL-MCNC: 3.4 MMOL/L — LOW (ref 3.5–5.3)
POTASSIUM SERPL-MCNC: 3.5 MMOL/L — SIGNIFICANT CHANGE UP (ref 3.5–5.3)
POTASSIUM SERPL-MCNC: 3.6 MMOL/L — SIGNIFICANT CHANGE UP (ref 3.5–5.3)
POTASSIUM SERPL-MCNC: 3.7 MMOL/L — SIGNIFICANT CHANGE UP (ref 3.5–5.3)
POTASSIUM SERPL-MCNC: 3.8 MMOL/L — SIGNIFICANT CHANGE UP (ref 3.5–5.3)
POTASSIUM SERPL-MCNC: 3.9 MMOL/L — SIGNIFICANT CHANGE UP (ref 3.5–5.3)
POTASSIUM SERPL-MCNC: 4 MMOL/L — SIGNIFICANT CHANGE UP (ref 3.5–5.3)
POTASSIUM SERPL-MCNC: 4.1 MMOL/L — SIGNIFICANT CHANGE UP (ref 3.5–5.3)
POTASSIUM SERPL-MCNC: 4.2 MMOL/L — SIGNIFICANT CHANGE UP (ref 3.5–5.3)
POTASSIUM SERPL-MCNC: 4.3 MMOL/L — SIGNIFICANT CHANGE UP (ref 3.5–5.3)
POTASSIUM SERPL-MCNC: 4.3 MMOL/L — SIGNIFICANT CHANGE UP (ref 3.5–5.3)
POTASSIUM SERPL-MCNC: 4.4 MMOL/L — SIGNIFICANT CHANGE UP (ref 3.5–5.3)
POTASSIUM SERPL-MCNC: 4.5 MMOL/L — SIGNIFICANT CHANGE UP (ref 3.5–5.3)
POTASSIUM SERPL-MCNC: 4.6 MMOL/L — SIGNIFICANT CHANGE UP (ref 3.5–5.3)
POTASSIUM SERPL-MCNC: 4.7 MMOL/L — SIGNIFICANT CHANGE UP (ref 3.5–5.3)
POTASSIUM SERPL-MCNC: 4.8 MMOL/L — SIGNIFICANT CHANGE UP (ref 3.5–5.3)
POTASSIUM SERPL-MCNC: 5 MMOL/L — SIGNIFICANT CHANGE UP (ref 3.5–5.3)
POTASSIUM SERPL-MCNC: 5.1 MMOL/L — SIGNIFICANT CHANGE UP (ref 3.5–5.3)
POTASSIUM SERPL-MCNC: 5.2 MMOL/L — SIGNIFICANT CHANGE UP (ref 3.5–5.3)
POTASSIUM SERPL-MCNC: SIGNIFICANT CHANGE UP (ref 3.5–5.3)
POTASSIUM SERPL-MCNC: SIGNIFICANT CHANGE UP (ref 3.5–5.3)
POTASSIUM SERPL-MCNC: SIGNIFICANT CHANGE UP MMOL/L (ref 3.5–5.3)
POTASSIUM SERPL-MCNC: SIGNIFICANT CHANGE UP MMOL/L (ref 3.5–5.3)
POTASSIUM SERPL-SCNC: 3.1 MMOL/L — LOW (ref 3.5–5.3)
POTASSIUM SERPL-SCNC: 3.2 MMOL/L — LOW (ref 3.5–5.3)
POTASSIUM SERPL-SCNC: 3.3 MMOL/L — LOW (ref 3.5–5.3)
POTASSIUM SERPL-SCNC: 3.4 MMOL/L — LOW (ref 3.5–5.3)
POTASSIUM SERPL-SCNC: 3.5 MMOL/L — SIGNIFICANT CHANGE UP (ref 3.5–5.3)
POTASSIUM SERPL-SCNC: 3.6 MMOL/L — SIGNIFICANT CHANGE UP (ref 3.5–5.3)
POTASSIUM SERPL-SCNC: 3.7 MMOL/L — SIGNIFICANT CHANGE UP (ref 3.5–5.3)
POTASSIUM SERPL-SCNC: 3.8 MMOL/L — SIGNIFICANT CHANGE UP (ref 3.5–5.3)
POTASSIUM SERPL-SCNC: 3.9 MMOL/L — SIGNIFICANT CHANGE UP (ref 3.5–5.3)
POTASSIUM SERPL-SCNC: 4 MMOL/L — SIGNIFICANT CHANGE UP (ref 3.5–5.3)
POTASSIUM SERPL-SCNC: 4.1 MMOL/L — SIGNIFICANT CHANGE UP (ref 3.5–5.3)
POTASSIUM SERPL-SCNC: 4.2 MMOL/L — SIGNIFICANT CHANGE UP (ref 3.5–5.3)
POTASSIUM SERPL-SCNC: 4.3 MMOL/L — SIGNIFICANT CHANGE UP (ref 3.5–5.3)
POTASSIUM SERPL-SCNC: 4.3 MMOL/L — SIGNIFICANT CHANGE UP (ref 3.5–5.3)
POTASSIUM SERPL-SCNC: 4.4 MMOL/L — SIGNIFICANT CHANGE UP (ref 3.5–5.3)
POTASSIUM SERPL-SCNC: 4.5 MMOL/L — SIGNIFICANT CHANGE UP (ref 3.5–5.3)
POTASSIUM SERPL-SCNC: 4.6 MMOL/L — SIGNIFICANT CHANGE UP (ref 3.5–5.3)
POTASSIUM SERPL-SCNC: 4.7 MMOL/L — SIGNIFICANT CHANGE UP (ref 3.5–5.3)
POTASSIUM SERPL-SCNC: 4.8 MMOL/L — SIGNIFICANT CHANGE UP (ref 3.5–5.3)
POTASSIUM SERPL-SCNC: 5 MMOL/L — SIGNIFICANT CHANGE UP (ref 3.5–5.3)
POTASSIUM SERPL-SCNC: 5.1 MMOL/L — SIGNIFICANT CHANGE UP (ref 3.5–5.3)
POTASSIUM SERPL-SCNC: 5.2 MMOL/L — SIGNIFICANT CHANGE UP (ref 3.5–5.3)
POTASSIUM SERPL-SCNC: SIGNIFICANT CHANGE UP (ref 3.5–5.3)
POTASSIUM SERPL-SCNC: SIGNIFICANT CHANGE UP (ref 3.5–5.3)
POTASSIUM SERPL-SCNC: SIGNIFICANT CHANGE UP MMOL/L (ref 3.5–5.3)
POTASSIUM SERPL-SCNC: SIGNIFICANT CHANGE UP MMOL/L (ref 3.5–5.3)
POTASSIUM UR-SCNC: 21 MMOL/L — SIGNIFICANT CHANGE UP
POTASSIUM UR-SCNC: 27 MMOL/L — SIGNIFICANT CHANGE UP
POTASSIUM UR-SCNC: 28 MMOL/L — SIGNIFICANT CHANGE UP
PREALB SERPL-MCNC: 11 MG/DL — LOW (ref 20–40)
PREALB SERPL-MCNC: 12 MG/DL — LOW (ref 20–40)
PREALB SERPL-MCNC: 12 MG/DL — LOW (ref 20–40)
PREALB SERPL-MCNC: 14 MG/DL — LOW (ref 20–40)
PREALB SERPL-MCNC: 15 MG/DL — LOW (ref 20–40)
PREALB SERPL-MCNC: 16 MG/DL — LOW (ref 20–40)
PREALB SERPL-MCNC: 17 MG/DL — LOW (ref 20–40)
PREALB SERPL-MCNC: 8 MG/DL — LOW (ref 20–40)
PROCALCITONIN SERPL-MCNC: 0.05 NG/ML — SIGNIFICANT CHANGE UP (ref 0.02–0.1)
PROCALCITONIN SERPL-MCNC: 0.07 NG/ML — SIGNIFICANT CHANGE UP (ref 0.02–0.1)
PROT ?TM UR-MCNC: 13 MG/DL — HIGH (ref 0–12)
PROT ?TM UR-MCNC: 8 MG/DL — SIGNIFICANT CHANGE UP (ref 0–12)
PROT ?TM UR-MCNC: 90 MG/DL — HIGH (ref 0–12)
PROT SERPL-MCNC: 5.7 G/DL — LOW (ref 6–8.3)
PROT SERPL-MCNC: 5.7 G/DL — LOW (ref 6–8.3)
PROT SERPL-MCNC: 5.8 G/DL — LOW (ref 6–8.3)
PROT SERPL-MCNC: 5.8 G/DL — LOW (ref 6–8.3)
PROT SERPL-MCNC: 6.1 G/DL — SIGNIFICANT CHANGE UP (ref 6–8.3)
PROT SERPL-MCNC: 6.3 G/DL — SIGNIFICANT CHANGE UP (ref 6–8.3)
PROT SERPL-MCNC: 6.5 G/DL — SIGNIFICANT CHANGE UP (ref 6–8.3)
PROT SERPL-MCNC: 6.5 G/DL — SIGNIFICANT CHANGE UP (ref 6–8.3)
PROT SERPL-MCNC: 6.6 G/DL — SIGNIFICANT CHANGE UP (ref 6–8.3)
PROT SERPL-MCNC: 6.6 G/DL — SIGNIFICANT CHANGE UP (ref 6–8.3)
PROT SERPL-MCNC: 6.7 G/DL — SIGNIFICANT CHANGE UP (ref 6–8.3)
PROT SERPL-MCNC: 6.9 G/DL — SIGNIFICANT CHANGE UP (ref 6–8.3)
PROT SERPL-MCNC: 7 G/DL — SIGNIFICANT CHANGE UP (ref 6–8.3)
PROT SERPL-MCNC: 7.1 G/DL — SIGNIFICANT CHANGE UP (ref 6–8.3)
PROT SERPL-MCNC: 7.3 G/DL — SIGNIFICANT CHANGE UP (ref 6–8.3)
PROT SERPL-MCNC: 7.4 G/DL — SIGNIFICANT CHANGE UP (ref 6–8.3)
PROT SERPL-MCNC: 7.6 G/DL — SIGNIFICANT CHANGE UP (ref 6–8.3)
PROT SERPL-MCNC: 7.9 G/DL — SIGNIFICANT CHANGE UP (ref 6–8.3)
PROT SERPL-MCNC: 8.5 G/DL — HIGH (ref 6–8.3)
PROT UR-MCNC: 30 MG/DL
PROT UR-MCNC: ABNORMAL MG/DL
PROT UR-MCNC: NEGATIVE MG/DL — SIGNIFICANT CHANGE UP
PROT/CREAT UR-RTO: 0.5 RATIO — HIGH (ref 0–0.2)
PROT/CREAT UR-RTO: 1.7 RATIO — HIGH (ref 0–0.2)
PROTEUS SP DNA BLD POS QL NAA+NON-PROBE: SIGNIFICANT CHANGE UP
PROTHROM AB SERPL-ACNC: 14 SEC — HIGH (ref 10.5–13.4)
PROTHROM AB SERPL-ACNC: 14.5 SEC — HIGH (ref 10.5–13.4)
PROTHROM AB SERPL-ACNC: 14.8 SEC — HIGH (ref 10.5–13.4)
PROTHROM AB SERPL-ACNC: 14.8 SEC — HIGH (ref 10.5–13.4)
PROTHROM AB SERPL-ACNC: 14.9 SEC — HIGH (ref 10.5–13.4)
PROTHROM AB SERPL-ACNC: 15 SEC — HIGH (ref 10.5–13.4)
PROTHROM AB SERPL-ACNC: 15.1 SEC — HIGH (ref 10.5–13.4)
PROTHROM AB SERPL-ACNC: 15.2 SEC — HIGH (ref 10.5–13.4)
PROTHROM AB SERPL-ACNC: 15.2 SEC — HIGH (ref 10.5–13.4)
PROTHROM AB SERPL-ACNC: 15.4 SEC — HIGH (ref 10.5–13.4)
PROTHROM AB SERPL-ACNC: 15.6 SEC — HIGH (ref 10.5–13.4)
PROTHROM AB SERPL-ACNC: 15.7 SEC — HIGH (ref 10.5–13.4)
PROTHROM AB SERPL-ACNC: 15.8 SEC — HIGH (ref 10.5–13.4)
PROTHROM AB SERPL-ACNC: 15.8 SEC — HIGH (ref 10.5–13.4)
PROTHROM AB SERPL-ACNC: 16.4 SEC — HIGH (ref 10.5–13.4)
PROTHROM AB SERPL-ACNC: 16.4 SEC — HIGH (ref 10.5–13.4)
PROTHROM AB SERPL-ACNC: 16.6 SEC — HIGH (ref 10.5–13.4)
PROTHROM AB SERPL-ACNC: 16.6 SEC — HIGH (ref 10.5–13.4)
PROTHROM AB SERPL-ACNC: 17 SEC — HIGH (ref 10.5–13.4)
PROTHROM AB SERPL-ACNC: 17.1 SEC — HIGH (ref 10.5–13.4)
PROTHROM AB SERPL-ACNC: 17.6 SEC — HIGH (ref 10.5–13.4)
PYRIDOXAL PHOS SERPL-MCNC: 14.4 UG/L — SIGNIFICANT CHANGE UP (ref 3.4–65.2)
RAPID RVP RESULT: SIGNIFICANT CHANGE UP
RBC # BLD: 1.65 M/UL — LOW (ref 4.2–5.8)
RBC # BLD: 2.01 M/UL — LOW (ref 4.2–5.8)
RBC # BLD: 2.12 M/UL — LOW (ref 4.2–5.8)
RBC # BLD: 2.13 M/UL — LOW (ref 4.2–5.8)
RBC # BLD: 2.13 M/UL — LOW (ref 4.2–5.8)
RBC # BLD: 2.32 M/UL — LOW (ref 4.2–5.8)
RBC # BLD: 2.38 M/UL — LOW (ref 4.2–5.8)
RBC # BLD: 2.4 M/UL — LOW (ref 4.2–5.8)
RBC # BLD: 2.41 M/UL — LOW (ref 4.2–5.8)
RBC # BLD: 2.42 M/UL — LOW (ref 4.2–5.8)
RBC # BLD: 2.43 M/UL — LOW (ref 4.2–5.8)
RBC # BLD: 2.49 M/UL — LOW (ref 4.2–5.8)
RBC # BLD: 2.52 M/UL — LOW (ref 4.2–5.8)
RBC # BLD: 2.53 M/UL — LOW (ref 4.2–5.8)
RBC # BLD: 2.53 M/UL — LOW (ref 4.2–5.8)
RBC # BLD: 2.55 M/UL — LOW (ref 4.2–5.8)
RBC # BLD: 2.57 M/UL — LOW (ref 4.2–5.8)
RBC # BLD: 2.62 M/UL — LOW (ref 4.2–5.8)
RBC # BLD: 2.64 M/UL — LOW (ref 4.2–5.8)
RBC # BLD: 2.65 M/UL — LOW (ref 4.2–5.8)
RBC # BLD: 2.65 M/UL — LOW (ref 4.2–5.8)
RBC # BLD: 2.67 M/UL — LOW (ref 4.2–5.8)
RBC # BLD: 2.69 M/UL — LOW (ref 4.2–5.8)
RBC # BLD: 2.69 M/UL — LOW (ref 4.2–5.8)
RBC # BLD: 2.71 M/UL — LOW (ref 4.2–5.8)
RBC # BLD: 2.72 M/UL — LOW (ref 4.2–5.8)
RBC # BLD: 2.76 M/UL — LOW (ref 4.2–5.8)
RBC # BLD: 2.77 M/UL — LOW (ref 4.2–5.8)
RBC # BLD: 2.78 M/UL — LOW (ref 4.2–5.8)
RBC # BLD: 2.79 M/UL — LOW (ref 4.2–5.8)
RBC # BLD: 2.79 M/UL — LOW (ref 4.2–5.8)
RBC # BLD: 2.81 M/UL — LOW (ref 4.2–5.8)
RBC # BLD: 2.82 M/UL — LOW (ref 4.2–5.8)
RBC # BLD: 2.83 M/UL — LOW (ref 4.2–5.8)
RBC # BLD: 2.84 M/UL — LOW (ref 4.2–5.8)
RBC # BLD: 2.85 M/UL — LOW (ref 4.2–5.8)
RBC # BLD: 2.85 M/UL — LOW (ref 4.2–5.8)
RBC # BLD: 2.86 M/UL — LOW (ref 4.2–5.8)
RBC # BLD: 2.88 M/UL — LOW (ref 4.2–5.8)
RBC # BLD: 2.88 M/UL — LOW (ref 4.2–5.8)
RBC # BLD: 2.9 M/UL — LOW (ref 4.2–5.8)
RBC # BLD: 2.9 M/UL — LOW (ref 4.2–5.8)
RBC # BLD: 2.91 M/UL — LOW (ref 4.2–5.8)
RBC # BLD: 2.92 M/UL — LOW (ref 4.2–5.8)
RBC # BLD: 2.93 M/UL — LOW (ref 4.2–5.8)
RBC # BLD: 2.93 M/UL — LOW (ref 4.2–5.8)
RBC # BLD: 2.94 M/UL — LOW (ref 4.2–5.8)
RBC # BLD: 2.96 M/UL — LOW (ref 4.2–5.8)
RBC # BLD: 2.96 M/UL — LOW (ref 4.2–5.8)
RBC # BLD: 2.99 M/UL — LOW (ref 4.2–5.8)
RBC # BLD: 3.01 M/UL — LOW (ref 4.2–5.8)
RBC # BLD: 3.03 M/UL — LOW (ref 4.2–5.8)
RBC # BLD: 3.06 M/UL — LOW (ref 4.2–5.8)
RBC # BLD: 3.07 M/UL — LOW (ref 4.2–5.8)
RBC # BLD: 3.08 M/UL — LOW (ref 4.2–5.8)
RBC # BLD: 3.09 M/UL — LOW (ref 4.2–5.8)
RBC # BLD: 3.09 M/UL — LOW (ref 4.2–5.8)
RBC # BLD: 3.12 M/UL — LOW (ref 4.2–5.8)
RBC # BLD: 3.14 M/UL — LOW (ref 4.2–5.8)
RBC # BLD: 3.15 M/UL — LOW (ref 4.2–5.8)
RBC # BLD: 3.17 M/UL — LOW (ref 4.2–5.8)
RBC # BLD: 3.21 M/UL — LOW (ref 4.2–5.8)
RBC # BLD: 3.25 M/UL — LOW (ref 4.2–5.8)
RBC # BLD: 3.31 M/UL — LOW (ref 4.2–5.8)
RBC # BLD: 3.33 M/UL — LOW (ref 4.2–5.8)
RBC # BLD: 3.35 M/UL — LOW (ref 4.2–5.8)
RBC # BLD: 3.35 M/UL — LOW (ref 4.2–5.8)
RBC # BLD: 3.36 M/UL — LOW (ref 4.2–5.8)
RBC # BLD: 3.37 M/UL — LOW (ref 4.2–5.8)
RBC # BLD: 3.38 M/UL — LOW (ref 4.2–5.8)
RBC # BLD: 3.38 M/UL — LOW (ref 4.2–5.8)
RBC # BLD: 3.39 M/UL — LOW (ref 4.2–5.8)
RBC # BLD: 3.43 M/UL — LOW (ref 4.2–5.8)
RBC # BLD: 3.46 M/UL — LOW (ref 4.2–5.8)
RBC # BLD: 3.47 M/UL — LOW (ref 4.2–5.8)
RBC # BLD: 3.49 M/UL — LOW (ref 4.2–5.8)
RBC # BLD: 3.5 M/UL — LOW (ref 4.2–5.8)
RBC # BLD: 3.5 M/UL — LOW (ref 4.2–5.8)
RBC # BLD: 3.51 M/UL — LOW (ref 4.2–5.8)
RBC # BLD: 3.52 M/UL — LOW (ref 4.2–5.8)
RBC # BLD: 3.54 M/UL — LOW (ref 4.2–5.8)
RBC # BLD: 3.55 M/UL — LOW (ref 4.2–5.8)
RBC # BLD: 3.56 M/UL — LOW (ref 4.2–5.8)
RBC # BLD: 3.64 M/UL — LOW (ref 4.2–5.8)
RBC # BLD: 3.7 M/UL — LOW (ref 4.2–5.8)
RBC # BLD: 3.72 M/UL — LOW (ref 4.2–5.8)
RBC # BLD: 3.72 M/UL — LOW (ref 4.2–5.8)
RBC # BLD: 3.81 M/UL — LOW (ref 4.2–5.8)
RBC # BLD: 3.86 M/UL — LOW (ref 4.2–5.8)
RBC # BLD: 3.86 M/UL — LOW (ref 4.2–5.8)
RBC # BLD: 3.87 M/UL — LOW (ref 4.2–5.8)
RBC # BLD: 3.87 M/UL — LOW (ref 4.2–5.8)
RBC # BLD: 3.88 M/UL — LOW (ref 4.2–5.8)
RBC # BLD: 3.89 M/UL — LOW (ref 4.2–5.8)
RBC # BLD: 3.89 M/UL — LOW (ref 4.2–5.8)
RBC # BLD: 4.01 M/UL — LOW (ref 4.2–5.8)
RBC # BLD: 4.18 M/UL — LOW (ref 4.2–5.8)
RBC # FLD: 15 % — HIGH (ref 10.3–14.5)
RBC # FLD: 15.4 % — HIGH (ref 10.3–14.5)
RBC # FLD: 15.5 % — HIGH (ref 10.3–14.5)
RBC # FLD: 15.6 % — HIGH (ref 10.3–14.5)
RBC # FLD: 15.7 % — HIGH (ref 10.3–14.5)
RBC # FLD: 15.8 % — HIGH (ref 10.3–14.5)
RBC # FLD: 15.9 % — HIGH (ref 10.3–14.5)
RBC # FLD: 16 % — HIGH (ref 10.3–14.5)
RBC # FLD: 16 % — HIGH (ref 10.3–14.5)
RBC # FLD: 16.1 % — HIGH (ref 10.3–14.5)
RBC # FLD: 16.1 % — HIGH (ref 10.3–14.5)
RBC # FLD: 16.2 % — HIGH (ref 10.3–14.5)
RBC # FLD: 16.2 % — HIGH (ref 10.3–14.5)
RBC # FLD: 16.3 % — HIGH (ref 10.3–14.5)
RBC # FLD: 16.4 % — HIGH (ref 10.3–14.5)
RBC # FLD: 16.5 % — HIGH (ref 10.3–14.5)
RBC # FLD: 16.6 % — HIGH (ref 10.3–14.5)
RBC # FLD: 16.7 % — HIGH (ref 10.3–14.5)
RBC # FLD: 16.8 % — HIGH (ref 10.3–14.5)
RBC # FLD: 16.9 % — HIGH (ref 10.3–14.5)
RBC # FLD: 17 % — HIGH (ref 10.3–14.5)
RBC # FLD: 17.1 % — HIGH (ref 10.3–14.5)
RBC # FLD: 17.2 % — HIGH (ref 10.3–14.5)
RBC # FLD: 17.3 % — HIGH (ref 10.3–14.5)
RBC # FLD: 17.4 % — HIGH (ref 10.3–14.5)
RBC # FLD: 17.7 % — HIGH (ref 10.3–14.5)
RBC # FLD: 17.8 % — HIGH (ref 10.3–14.5)
RBC # FLD: 17.9 % — HIGH (ref 10.3–14.5)
RBC # FLD: 17.9 % — HIGH (ref 10.3–14.5)
RBC # FLD: 18.3 % — HIGH (ref 10.3–14.5)
RBC # FLD: 18.6 % — HIGH (ref 10.3–14.5)
RBC BLD AUTO: ABNORMAL
RBC CASTS # UR COMP ASSIST: < 5 /HPF — SIGNIFICANT CHANGE UP
RETICS #: 67.6 K/UL — SIGNIFICANT CHANGE UP (ref 25–125)
RETICS/RBC NFR: 1.8 % — SIGNIFICANT CHANGE UP (ref 0.5–2.5)
RH IG SCN BLD-IMP: POSITIVE — SIGNIFICANT CHANGE UP
RSV RNA NPH QL NAA+NON-PROBE: SIGNIFICANT CHANGE UP
S LUGDUNENSIS DNA SNV QL NAA+NON-PROBE: SIGNIFICANT CHANGE UP
S MARCESCENS DNA BLD POS NAA+NON-PROBE: SIGNIFICANT CHANGE UP
S PNEUM DNA BLD POS QL NAA+NON-PROBE: SIGNIFICANT CHANGE UP
S PYO DNA BLD POS QL NAA+NON-PROBE: SIGNIFICANT CHANGE UP
SALMONELLA DNA BLD POS QL NAA+NON-PROBE: SIGNIFICANT CHANGE UP
SAO2 % BLDA: 99.2 % — HIGH (ref 94–98)
SARS-COV-2 RNA SPEC QL NAA+PROBE: SIGNIFICANT CHANGE UP
SMUDGE CELLS # BLD: PRESENT — SIGNIFICANT CHANGE UP
SMUDGE CELLS # BLD: PRESENT — SIGNIFICANT CHANGE UP
SODIUM SERPL-SCNC: 125 MMOL/L — LOW (ref 135–145)
SODIUM SERPL-SCNC: 129 MMOL/L — LOW (ref 135–145)
SODIUM SERPL-SCNC: 130 MMOL/L — LOW (ref 135–145)
SODIUM SERPL-SCNC: 131 MMOL/L — LOW (ref 135–145)
SODIUM SERPL-SCNC: 132 MMOL/L — LOW (ref 135–145)
SODIUM SERPL-SCNC: 133 MMOL/L — LOW (ref 135–145)
SODIUM SERPL-SCNC: 134 MMOL/L — LOW (ref 135–145)
SODIUM SERPL-SCNC: 135 MMOL/L — SIGNIFICANT CHANGE UP (ref 135–145)
SODIUM SERPL-SCNC: 136 MMOL/L — SIGNIFICANT CHANGE UP (ref 135–145)
SODIUM SERPL-SCNC: 137 MMOL/L — SIGNIFICANT CHANGE UP (ref 135–145)
SODIUM SERPL-SCNC: 138 MMOL/L — SIGNIFICANT CHANGE UP (ref 135–145)
SODIUM SERPL-SCNC: 139 MMOL/L — SIGNIFICANT CHANGE UP (ref 135–145)
SODIUM SERPL-SCNC: 140 MMOL/L — SIGNIFICANT CHANGE UP (ref 135–145)
SODIUM SERPL-SCNC: 140 MMOL/L — SIGNIFICANT CHANGE UP (ref 135–145)
SODIUM SERPL-SCNC: 142 MMOL/L — SIGNIFICANT CHANGE UP (ref 135–145)
SODIUM SERPL-SCNC: 142 MMOL/L — SIGNIFICANT CHANGE UP (ref 135–145)
SODIUM SERPL-SCNC: 144 MMOL/L — SIGNIFICANT CHANGE UP (ref 135–145)
SODIUM SERPL-SCNC: SIGNIFICANT CHANGE UP (ref 135–145)
SODIUM SERPL-SCNC: SIGNIFICANT CHANGE UP MMOL/L (ref 135–145)
SODIUM SERPL-SCNC: SIGNIFICANT CHANGE UP MMOL/L (ref 135–145)
SODIUM UR-SCNC: 109 MMOL/L — SIGNIFICANT CHANGE UP
SODIUM UR-SCNC: 146 MMOL/L — SIGNIFICANT CHANGE UP
SODIUM UR-SCNC: 47 MMOL/L — SIGNIFICANT CHANGE UP
SODIUM UR-SCNC: <20 MMOL/L — SIGNIFICANT CHANGE UP
SP GR SPEC: 1.01 — SIGNIFICANT CHANGE UP (ref 1–1.03)
SP GR SPEC: 1.02 — SIGNIFICANT CHANGE UP (ref 1–1.03)
SP GR SPEC: <=1.005 — SIGNIFICANT CHANGE UP (ref 1–1.03)
SP GR SPEC: >=1.03 — SIGNIFICANT CHANGE UP (ref 1–1.03)
SPECIMEN SOURCE: SIGNIFICANT CHANGE UP
SPHEROCYTES BLD QL SMEAR: SLIGHT — SIGNIFICANT CHANGE UP
SPHEROCYTES BLD QL SMEAR: SLIGHT — SIGNIFICANT CHANGE UP
SURGICAL PATHOLOGY STUDY: SIGNIFICANT CHANGE UP
SURGICAL PATHOLOGY STUDY: SIGNIFICANT CHANGE UP
THC UR QL: NEGATIVE — SIGNIFICANT CHANGE UP
TIBC SERPL-MCNC: 167 UG/DL — LOW (ref 220–430)
TIBC SERPL-MCNC: 249 UG/DL — SIGNIFICANT CHANGE UP (ref 220–430)
TOBRAMYCIN TROUGH SERPL-MCNC: 1.2 UG/ML — SIGNIFICANT CHANGE UP (ref 0–2)
TOBRAMYCIN TROUGH SERPL-MCNC: 1.3 UG/ML — SIGNIFICANT CHANGE UP (ref 0–2)
TRIGL SERPL-MCNC: 103 MG/DL — SIGNIFICANT CHANGE UP
TRIGL SERPL-MCNC: 123 MG/DL — SIGNIFICANT CHANGE UP
TRIGL SERPL-MCNC: 145 MG/DL — SIGNIFICANT CHANGE UP
TRIGL SERPL-MCNC: 153 MG/DL — HIGH
TRIGL SERPL-MCNC: 80 MG/DL — SIGNIFICANT CHANGE UP
TRIGL SERPL-MCNC: 83 MG/DL — SIGNIFICANT CHANGE UP
TRIGL SERPL-MCNC: 99 MG/DL — SIGNIFICANT CHANGE UP
TROPONIN T SERPL-MCNC: 0.01 NG/ML — SIGNIFICANT CHANGE UP (ref 0–0.01)
TROPONIN T SERPL-MCNC: 0.01 NG/ML — SIGNIFICANT CHANGE UP (ref 0–0.01)
TSH SERPL-MCNC: 6.04 UIU/ML — HIGH (ref 0.27–4.2)
UIBC SERPL-MCNC: 146 UG/DL — SIGNIFICANT CHANGE UP (ref 110–370)
UIBC SERPL-MCNC: 227 UG/DL — SIGNIFICANT CHANGE UP (ref 110–370)
UROBILINOGEN FLD QL: 0.2 E.U./DL — SIGNIFICANT CHANGE UP
UROBILINOGEN FLD QL: 1 E.U./DL — SIGNIFICANT CHANGE UP
UUN UR-MCNC: 1029 MG/DL — SIGNIFICANT CHANGE UP
UUN UR-MCNC: 163 MG/DL — SIGNIFICANT CHANGE UP
UUN UR-MCNC: 263 MG/DL — SIGNIFICANT CHANGE UP
UUN UR-MCNC: 936 MG/DL — SIGNIFICANT CHANGE UP
VANCOMYCIN TROUGH SERPL-MCNC: 7.4 UG/ML — LOW (ref 10–20)
VANCOMYCIN TROUGH SERPL-MCNC: 8 UG/ML — LOW (ref 10–20)
VIT B1 SERPL-MCNC: 248.6 NMOL/L — HIGH (ref 66.5–200)
VIT B12 SERPL-MCNC: 1621 PG/ML — HIGH (ref 232–1245)
VIT B12 SERPL-MCNC: 915 PG/ML — SIGNIFICANT CHANGE UP (ref 232–1245)
VIT B12 SERPL-MCNC: 977 PG/ML — SIGNIFICANT CHANGE UP (ref 232–1245)
VIT D25+D1,25 OH+D1,25 PNL SERPL-MCNC: 12.1 PG/ML — LOW (ref 19.9–79.3)
WBC # BLD: 10.04 K/UL — SIGNIFICANT CHANGE UP (ref 3.8–10.5)
WBC # BLD: 10.05 K/UL — SIGNIFICANT CHANGE UP (ref 3.8–10.5)
WBC # BLD: 10.13 K/UL — SIGNIFICANT CHANGE UP (ref 3.8–10.5)
WBC # BLD: 10.13 K/UL — SIGNIFICANT CHANGE UP (ref 3.8–10.5)
WBC # BLD: 10.21 K/UL — SIGNIFICANT CHANGE UP (ref 3.8–10.5)
WBC # BLD: 10.69 K/UL — HIGH (ref 3.8–10.5)
WBC # BLD: 10.82 K/UL — HIGH (ref 3.8–10.5)
WBC # BLD: 10.85 K/UL — HIGH (ref 3.8–10.5)
WBC # BLD: 11.16 K/UL — HIGH (ref 3.8–10.5)
WBC # BLD: 11.16 K/UL — HIGH (ref 3.8–10.5)
WBC # BLD: 11.18 K/UL — HIGH (ref 3.8–10.5)
WBC # BLD: 11.25 K/UL — HIGH (ref 3.8–10.5)
WBC # BLD: 11.26 K/UL — HIGH (ref 3.8–10.5)
WBC # BLD: 11.32 K/UL — HIGH (ref 3.8–10.5)
WBC # BLD: 11.38 K/UL — HIGH (ref 3.8–10.5)
WBC # BLD: 11.38 K/UL — HIGH (ref 3.8–10.5)
WBC # BLD: 11.44 K/UL — HIGH (ref 3.8–10.5)
WBC # BLD: 11.77 K/UL — HIGH (ref 3.8–10.5)
WBC # BLD: 11.8 K/UL — HIGH (ref 3.8–10.5)
WBC # BLD: 11.86 K/UL — HIGH (ref 3.8–10.5)
WBC # BLD: 12.14 K/UL — HIGH (ref 3.8–10.5)
WBC # BLD: 12.44 K/UL — HIGH (ref 3.8–10.5)
WBC # BLD: 12.5 K/UL — HIGH (ref 3.8–10.5)
WBC # BLD: 12.58 K/UL — HIGH (ref 3.8–10.5)
WBC # BLD: 12.6 K/UL — HIGH (ref 3.8–10.5)
WBC # BLD: 12.72 K/UL — HIGH (ref 3.8–10.5)
WBC # BLD: 12.77 K/UL — HIGH (ref 3.8–10.5)
WBC # BLD: 12.81 K/UL — HIGH (ref 3.8–10.5)
WBC # BLD: 12.85 K/UL — HIGH (ref 3.8–10.5)
WBC # BLD: 13.05 K/UL — HIGH (ref 3.8–10.5)
WBC # BLD: 13.09 K/UL — HIGH (ref 3.8–10.5)
WBC # BLD: 13.11 K/UL — HIGH (ref 3.8–10.5)
WBC # BLD: 13.32 K/UL — HIGH (ref 3.8–10.5)
WBC # BLD: 13.71 K/UL — HIGH (ref 3.8–10.5)
WBC # BLD: 13.74 K/UL — HIGH (ref 3.8–10.5)
WBC # BLD: 13.83 K/UL — HIGH (ref 3.8–10.5)
WBC # BLD: 13.98 K/UL — HIGH (ref 3.8–10.5)
WBC # BLD: 14.09 K/UL — HIGH (ref 3.8–10.5)
WBC # BLD: 14.19 K/UL — HIGH (ref 3.8–10.5)
WBC # BLD: 14.25 K/UL — HIGH (ref 3.8–10.5)
WBC # BLD: 14.26 K/UL — HIGH (ref 3.8–10.5)
WBC # BLD: 14.39 K/UL — HIGH (ref 3.8–10.5)
WBC # BLD: 14.59 K/UL — HIGH (ref 3.8–10.5)
WBC # BLD: 14.63 K/UL — HIGH (ref 3.8–10.5)
WBC # BLD: 14.76 K/UL — HIGH (ref 3.8–10.5)
WBC # BLD: 14.8 K/UL — HIGH (ref 3.8–10.5)
WBC # BLD: 15.02 K/UL — HIGH (ref 3.8–10.5)
WBC # BLD: 15.47 K/UL — HIGH (ref 3.8–10.5)
WBC # BLD: 15.75 K/UL — HIGH (ref 3.8–10.5)
WBC # BLD: 15.88 K/UL — HIGH (ref 3.8–10.5)
WBC # BLD: 16.82 K/UL — HIGH (ref 3.8–10.5)
WBC # BLD: 17.11 K/UL — HIGH (ref 3.8–10.5)
WBC # BLD: 17.25 K/UL — HIGH (ref 3.8–10.5)
WBC # BLD: 17.41 K/UL — HIGH (ref 3.8–10.5)
WBC # BLD: 17.54 K/UL — HIGH (ref 3.8–10.5)
WBC # BLD: 18.15 K/UL — HIGH (ref 3.8–10.5)
WBC # BLD: 18.17 K/UL — HIGH (ref 3.8–10.5)
WBC # BLD: 18.64 K/UL — HIGH (ref 3.8–10.5)
WBC # BLD: 20.14 K/UL — HIGH (ref 3.8–10.5)
WBC # BLD: 20.44 K/UL — HIGH (ref 3.8–10.5)
WBC # BLD: 20.53 K/UL — HIGH (ref 3.8–10.5)
WBC # BLD: 20.91 K/UL — HIGH (ref 3.8–10.5)
WBC # BLD: 21.15 K/UL — HIGH (ref 3.8–10.5)
WBC # BLD: 21.26 K/UL — HIGH (ref 3.8–10.5)
WBC # BLD: 22.24 K/UL — HIGH (ref 3.8–10.5)
WBC # BLD: 22.73 K/UL — HIGH (ref 3.8–10.5)
WBC # BLD: 22.79 K/UL — HIGH (ref 3.8–10.5)
WBC # BLD: 23.2 K/UL — HIGH (ref 3.8–10.5)
WBC # BLD: 23.43 K/UL — HIGH (ref 3.8–10.5)
WBC # BLD: 23.87 K/UL — HIGH (ref 3.8–10.5)
WBC # BLD: 24.35 K/UL — HIGH (ref 3.8–10.5)
WBC # BLD: 24.7 K/UL — HIGH (ref 3.8–10.5)
WBC # BLD: 26.6 K/UL — HIGH (ref 3.8–10.5)
WBC # BLD: 26.67 K/UL — HIGH (ref 3.8–10.5)
WBC # BLD: 26.68 K/UL — HIGH (ref 3.8–10.5)
WBC # BLD: 26.84 K/UL — HIGH (ref 3.8–10.5)
WBC # BLD: 27.82 K/UL — HIGH (ref 3.8–10.5)
WBC # BLD: 28.03 K/UL — HIGH (ref 3.8–10.5)
WBC # BLD: 28.53 K/UL — HIGH (ref 3.8–10.5)
WBC # BLD: 28.57 K/UL — HIGH (ref 3.8–10.5)
WBC # BLD: 28.9 K/UL — HIGH (ref 3.8–10.5)
WBC # BLD: 29.06 K/UL — HIGH (ref 3.8–10.5)
WBC # BLD: 29.36 K/UL — HIGH (ref 3.8–10.5)
WBC # BLD: 29.46 K/UL — HIGH (ref 3.8–10.5)
WBC # BLD: 29.57 K/UL — HIGH (ref 3.8–10.5)
WBC # BLD: 33 K/UL — HIGH (ref 3.8–10.5)
WBC # BLD: 33.46 K/UL — HIGH (ref 3.8–10.5)
WBC # BLD: 34.15 K/UL — HIGH (ref 3.8–10.5)
WBC # BLD: 35.07 K/UL — HIGH (ref 3.8–10.5)
WBC # BLD: 37.01 K/UL — HIGH (ref 3.8–10.5)
WBC # BLD: 40.04 K/UL — CRITICAL HIGH (ref 3.8–10.5)
WBC # BLD: 7.03 K/UL — SIGNIFICANT CHANGE UP (ref 3.8–10.5)
WBC # BLD: 7.29 K/UL — SIGNIFICANT CHANGE UP (ref 3.8–10.5)
WBC # BLD: 7.8 K/UL — SIGNIFICANT CHANGE UP (ref 3.8–10.5)
WBC # BLD: 8.09 K/UL — SIGNIFICANT CHANGE UP (ref 3.8–10.5)
WBC # BLD: 9.03 K/UL — SIGNIFICANT CHANGE UP (ref 3.8–10.5)
WBC # BLD: 9.29 K/UL — SIGNIFICANT CHANGE UP (ref 3.8–10.5)
WBC # BLD: 9.35 K/UL — SIGNIFICANT CHANGE UP (ref 3.8–10.5)
WBC # BLD: 9.46 K/UL — SIGNIFICANT CHANGE UP (ref 3.8–10.5)
WBC # BLD: 9.81 K/UL — SIGNIFICANT CHANGE UP (ref 3.8–10.5)
WBC # BLD: 9.83 K/UL — SIGNIFICANT CHANGE UP (ref 3.8–10.5)
WBC # BLD: 9.89 K/UL — SIGNIFICANT CHANGE UP (ref 3.8–10.5)
WBC # FLD AUTO: 10.04 K/UL — SIGNIFICANT CHANGE UP (ref 3.8–10.5)
WBC # FLD AUTO: 10.05 K/UL — SIGNIFICANT CHANGE UP (ref 3.8–10.5)
WBC # FLD AUTO: 10.13 K/UL — SIGNIFICANT CHANGE UP (ref 3.8–10.5)
WBC # FLD AUTO: 10.13 K/UL — SIGNIFICANT CHANGE UP (ref 3.8–10.5)
WBC # FLD AUTO: 10.21 K/UL — SIGNIFICANT CHANGE UP (ref 3.8–10.5)
WBC # FLD AUTO: 10.69 K/UL — HIGH (ref 3.8–10.5)
WBC # FLD AUTO: 10.82 K/UL — HIGH (ref 3.8–10.5)
WBC # FLD AUTO: 10.85 K/UL — HIGH (ref 3.8–10.5)
WBC # FLD AUTO: 11.16 K/UL — HIGH (ref 3.8–10.5)
WBC # FLD AUTO: 11.16 K/UL — HIGH (ref 3.8–10.5)
WBC # FLD AUTO: 11.18 K/UL — HIGH (ref 3.8–10.5)
WBC # FLD AUTO: 11.25 K/UL — HIGH (ref 3.8–10.5)
WBC # FLD AUTO: 11.26 K/UL — HIGH (ref 3.8–10.5)
WBC # FLD AUTO: 11.32 K/UL — HIGH (ref 3.8–10.5)
WBC # FLD AUTO: 11.38 K/UL — HIGH (ref 3.8–10.5)
WBC # FLD AUTO: 11.38 K/UL — HIGH (ref 3.8–10.5)
WBC # FLD AUTO: 11.44 K/UL — HIGH (ref 3.8–10.5)
WBC # FLD AUTO: 11.77 K/UL — HIGH (ref 3.8–10.5)
WBC # FLD AUTO: 11.8 K/UL — HIGH (ref 3.8–10.5)
WBC # FLD AUTO: 11.86 K/UL — HIGH (ref 3.8–10.5)
WBC # FLD AUTO: 12.14 K/UL — HIGH (ref 3.8–10.5)
WBC # FLD AUTO: 12.44 K/UL — HIGH (ref 3.8–10.5)
WBC # FLD AUTO: 12.5 K/UL — HIGH (ref 3.8–10.5)
WBC # FLD AUTO: 12.58 K/UL — HIGH (ref 3.8–10.5)
WBC # FLD AUTO: 12.6 K/UL — HIGH (ref 3.8–10.5)
WBC # FLD AUTO: 12.72 K/UL — HIGH (ref 3.8–10.5)
WBC # FLD AUTO: 12.77 K/UL — HIGH (ref 3.8–10.5)
WBC # FLD AUTO: 12.81 K/UL — HIGH (ref 3.8–10.5)
WBC # FLD AUTO: 12.85 K/UL — HIGH (ref 3.8–10.5)
WBC # FLD AUTO: 13.05 K/UL — HIGH (ref 3.8–10.5)
WBC # FLD AUTO: 13.09 K/UL — HIGH (ref 3.8–10.5)
WBC # FLD AUTO: 13.11 K/UL — HIGH (ref 3.8–10.5)
WBC # FLD AUTO: 13.32 K/UL — HIGH (ref 3.8–10.5)
WBC # FLD AUTO: 13.71 K/UL — HIGH (ref 3.8–10.5)
WBC # FLD AUTO: 13.74 K/UL — HIGH (ref 3.8–10.5)
WBC # FLD AUTO: 13.83 K/UL — HIGH (ref 3.8–10.5)
WBC # FLD AUTO: 13.98 K/UL — HIGH (ref 3.8–10.5)
WBC # FLD AUTO: 14.09 K/UL — HIGH (ref 3.8–10.5)
WBC # FLD AUTO: 14.19 K/UL — HIGH (ref 3.8–10.5)
WBC # FLD AUTO: 14.25 K/UL — HIGH (ref 3.8–10.5)
WBC # FLD AUTO: 14.26 K/UL — HIGH (ref 3.8–10.5)
WBC # FLD AUTO: 14.39 K/UL — HIGH (ref 3.8–10.5)
WBC # FLD AUTO: 14.59 K/UL — HIGH (ref 3.8–10.5)
WBC # FLD AUTO: 14.63 K/UL — HIGH (ref 3.8–10.5)
WBC # FLD AUTO: 14.76 K/UL — HIGH (ref 3.8–10.5)
WBC # FLD AUTO: 14.8 K/UL — HIGH (ref 3.8–10.5)
WBC # FLD AUTO: 15.02 K/UL — HIGH (ref 3.8–10.5)
WBC # FLD AUTO: 15.47 K/UL — HIGH (ref 3.8–10.5)
WBC # FLD AUTO: 15.75 K/UL — HIGH (ref 3.8–10.5)
WBC # FLD AUTO: 15.88 K/UL — HIGH (ref 3.8–10.5)
WBC # FLD AUTO: 16.82 K/UL — HIGH (ref 3.8–10.5)
WBC # FLD AUTO: 17.11 K/UL — HIGH (ref 3.8–10.5)
WBC # FLD AUTO: 17.25 K/UL — HIGH (ref 3.8–10.5)
WBC # FLD AUTO: 17.41 K/UL — HIGH (ref 3.8–10.5)
WBC # FLD AUTO: 17.54 K/UL — HIGH (ref 3.8–10.5)
WBC # FLD AUTO: 18.15 K/UL — HIGH (ref 3.8–10.5)
WBC # FLD AUTO: 18.17 K/UL — HIGH (ref 3.8–10.5)
WBC # FLD AUTO: 18.64 K/UL — HIGH (ref 3.8–10.5)
WBC # FLD AUTO: 20.14 K/UL — HIGH (ref 3.8–10.5)
WBC # FLD AUTO: 20.44 K/UL — HIGH (ref 3.8–10.5)
WBC # FLD AUTO: 20.53 K/UL — HIGH (ref 3.8–10.5)
WBC # FLD AUTO: 20.91 K/UL — HIGH (ref 3.8–10.5)
WBC # FLD AUTO: 21.15 K/UL — HIGH (ref 3.8–10.5)
WBC # FLD AUTO: 21.26 K/UL — HIGH (ref 3.8–10.5)
WBC # FLD AUTO: 22.24 K/UL — HIGH (ref 3.8–10.5)
WBC # FLD AUTO: 22.73 K/UL — HIGH (ref 3.8–10.5)
WBC # FLD AUTO: 22.79 K/UL — HIGH (ref 3.8–10.5)
WBC # FLD AUTO: 23.2 K/UL — HIGH (ref 3.8–10.5)
WBC # FLD AUTO: 23.43 K/UL — HIGH (ref 3.8–10.5)
WBC # FLD AUTO: 23.87 K/UL — HIGH (ref 3.8–10.5)
WBC # FLD AUTO: 24.35 K/UL — HIGH (ref 3.8–10.5)
WBC # FLD AUTO: 24.7 K/UL — HIGH (ref 3.8–10.5)
WBC # FLD AUTO: 26.6 K/UL — HIGH (ref 3.8–10.5)
WBC # FLD AUTO: 26.67 K/UL — HIGH (ref 3.8–10.5)
WBC # FLD AUTO: 26.68 K/UL — HIGH (ref 3.8–10.5)
WBC # FLD AUTO: 26.84 K/UL — HIGH (ref 3.8–10.5)
WBC # FLD AUTO: 27.82 K/UL — HIGH (ref 3.8–10.5)
WBC # FLD AUTO: 28.03 K/UL — HIGH (ref 3.8–10.5)
WBC # FLD AUTO: 28.53 K/UL — HIGH (ref 3.8–10.5)
WBC # FLD AUTO: 28.57 K/UL — HIGH (ref 3.8–10.5)
WBC # FLD AUTO: 28.9 K/UL — HIGH (ref 3.8–10.5)
WBC # FLD AUTO: 29.06 K/UL — HIGH (ref 3.8–10.5)
WBC # FLD AUTO: 29.36 K/UL — HIGH (ref 3.8–10.5)
WBC # FLD AUTO: 29.46 K/UL — HIGH (ref 3.8–10.5)
WBC # FLD AUTO: 29.57 K/UL — HIGH (ref 3.8–10.5)
WBC # FLD AUTO: 33 K/UL — HIGH (ref 3.8–10.5)
WBC # FLD AUTO: 33.46 K/UL — HIGH (ref 3.8–10.5)
WBC # FLD AUTO: 34.15 K/UL — HIGH (ref 3.8–10.5)
WBC # FLD AUTO: 35.07 K/UL — HIGH (ref 3.8–10.5)
WBC # FLD AUTO: 37.01 K/UL — HIGH (ref 3.8–10.5)
WBC # FLD AUTO: 40.04 K/UL — CRITICAL HIGH (ref 3.8–10.5)
WBC # FLD AUTO: 7.03 K/UL — SIGNIFICANT CHANGE UP (ref 3.8–10.5)
WBC # FLD AUTO: 7.29 K/UL — SIGNIFICANT CHANGE UP (ref 3.8–10.5)
WBC # FLD AUTO: 7.8 K/UL — SIGNIFICANT CHANGE UP (ref 3.8–10.5)
WBC # FLD AUTO: 8.09 K/UL — SIGNIFICANT CHANGE UP (ref 3.8–10.5)
WBC # FLD AUTO: 9.03 K/UL — SIGNIFICANT CHANGE UP (ref 3.8–10.5)
WBC # FLD AUTO: 9.29 K/UL — SIGNIFICANT CHANGE UP (ref 3.8–10.5)
WBC # FLD AUTO: 9.35 K/UL — SIGNIFICANT CHANGE UP (ref 3.8–10.5)
WBC # FLD AUTO: 9.46 K/UL — SIGNIFICANT CHANGE UP (ref 3.8–10.5)
WBC # FLD AUTO: 9.81 K/UL — SIGNIFICANT CHANGE UP (ref 3.8–10.5)
WBC # FLD AUTO: 9.83 K/UL — SIGNIFICANT CHANGE UP (ref 3.8–10.5)
WBC # FLD AUTO: 9.89 K/UL — SIGNIFICANT CHANGE UP (ref 3.8–10.5)
WBC UR QL: < 5 /HPF — SIGNIFICANT CHANGE UP
WBC UR QL: ABNORMAL /HPF

## 2023-01-01 PROCEDURE — 74019 RADEX ABDOMEN 2 VIEWS: CPT | Mod: 26

## 2023-01-01 PROCEDURE — 49020 DRAINAGE ABDOM ABSCESS OPEN: CPT | Mod: 78

## 2023-01-01 PROCEDURE — 99222 1ST HOSP IP/OBS MODERATE 55: CPT

## 2023-01-01 PROCEDURE — 99232 SBSQ HOSP IP/OBS MODERATE 35: CPT

## 2023-01-01 PROCEDURE — 99233 SBSQ HOSP IP/OBS HIGH 50: CPT

## 2023-01-01 PROCEDURE — 99152 MOD SED SAME PHYS/QHP 5/>YRS: CPT

## 2023-01-01 PROCEDURE — 27596 AMPUTATION FOLLOW-UP SURGERY: CPT | Mod: GC,LT,58

## 2023-01-01 PROCEDURE — 74177 CT ABD & PELVIS W/CONTRAST: CPT | Mod: 26

## 2023-01-01 PROCEDURE — 99231 SBSQ HOSP IP/OBS SF/LOW 25: CPT

## 2023-01-01 PROCEDURE — 71045 X-RAY EXAM CHEST 1 VIEW: CPT | Mod: 26

## 2023-01-01 PROCEDURE — 75984 XRAY CONTROL CATHETER CHANGE: CPT | Mod: 26

## 2023-01-01 PROCEDURE — 99233 SBSQ HOSP IP/OBS HIGH 50: CPT | Mod: GC

## 2023-01-01 PROCEDURE — 71045 X-RAY EXAM CHEST 1 VIEW: CPT | Mod: 26,76

## 2023-01-01 PROCEDURE — 49406 IMAGE CATH FLUID PERI/RETRO: CPT | Mod: 59

## 2023-01-01 PROCEDURE — 36415 COLL VENOUS BLD VENIPUNCTURE: CPT

## 2023-01-01 PROCEDURE — 99232 SBSQ HOSP IP/OBS MODERATE 35: CPT | Mod: GC

## 2023-01-01 PROCEDURE — 74018 RADEX ABDOMEN 1 VIEW: CPT | Mod: 26

## 2023-01-01 PROCEDURE — 99291 CRITICAL CARE FIRST HOUR: CPT

## 2023-01-01 PROCEDURE — 27592 AMPUTATE LEG AT THIGH: CPT | Mod: GC,LT

## 2023-01-01 PROCEDURE — 44625 REPAIR BOWEL OPENING: CPT | Mod: 62,78

## 2023-01-01 PROCEDURE — 76937 US GUIDE VASCULAR ACCESS: CPT | Mod: 26,59

## 2023-01-01 PROCEDURE — 44620 REPAIR BOWEL OPENING: CPT | Mod: 62,78

## 2023-01-01 PROCEDURE — 88307 TISSUE EXAM BY PATHOLOGIST: CPT | Mod: 26

## 2023-01-01 PROCEDURE — 88311 DECALCIFY TISSUE: CPT | Mod: 26

## 2023-01-01 PROCEDURE — 36569 INSJ PICC 5 YR+ W/O IMAGING: CPT

## 2023-01-01 PROCEDURE — 99497 ADVNCD CARE PLAN 30 MIN: CPT | Mod: 25

## 2023-01-01 PROCEDURE — 99223 1ST HOSP IP/OBS HIGH 75: CPT

## 2023-01-01 PROCEDURE — 36000 PLACE NEEDLE IN VEIN: CPT | Mod: 59

## 2023-01-01 PROCEDURE — 99231 SBSQ HOSP IP/OBS SF/LOW 25: CPT | Mod: GC

## 2023-01-01 PROCEDURE — 74176 CT ABD & PELVIS W/O CONTRAST: CPT | Mod: 26

## 2023-01-01 PROCEDURE — 99291 CRITICAL CARE FIRST HOUR: CPT | Mod: GC

## 2023-01-01 PROCEDURE — 44620 REPAIR BOWEL OPENING: CPT | Mod: 62

## 2023-01-01 PROCEDURE — 76080 X-RAY EXAM OF FISTULA: CPT | Mod: 26

## 2023-01-01 PROCEDURE — 99497 ADVNCD CARE PLAN 30 MIN: CPT

## 2023-01-01 PROCEDURE — 49020 DRAINAGE ABDOM ABSCESS OPEN: CPT | Mod: 82

## 2023-01-01 PROCEDURE — 71260 CT THORAX DX C+: CPT | Mod: 26

## 2023-01-01 PROCEDURE — 76705 ECHO EXAM OF ABDOMEN: CPT | Mod: 26

## 2023-01-01 PROCEDURE — 93306 TTE W/DOPPLER COMPLETE: CPT | Mod: 26

## 2023-01-01 PROCEDURE — 49423 EXCHANGE DRAINAGE CATHETER: CPT | Mod: 59

## 2023-01-01 PROCEDURE — 99498 ADVNCD CARE PLAN ADDL 30 MIN: CPT | Mod: GC

## 2023-01-01 PROCEDURE — 49424 ASSESS CYST CONTRAST INJECT: CPT

## 2023-01-01 DEVICE — STAPLER LINEAR: Type: IMPLANTABLE DEVICE | Status: FUNCTIONAL

## 2023-01-01 DEVICE — LUKENS BONE WAX 2.5G: Type: IMPLANTABLE DEVICE | Site: LEFT | Status: FUNCTIONAL

## 2023-01-01 DEVICE — STAPLER ETHICON PROXIMATE RELOAD 75MM BLUE: Type: IMPLANTABLE DEVICE | Status: FUNCTIONAL

## 2023-01-01 RX ORDER — DIPHENOXYLATE HCL/ATROPINE 2.5-.025MG
2 TABLET ORAL EVERY 8 HOURS
Refills: 0 | Status: DISCONTINUED | OUTPATIENT
Start: 2023-01-01 | End: 2023-01-01

## 2023-01-01 RX ORDER — SODIUM CHLORIDE 9 MG/ML
250 INJECTION, SOLUTION INTRAVENOUS ONCE
Refills: 0 | Status: COMPLETED | OUTPATIENT
Start: 2023-01-01 | End: 2023-01-01

## 2023-01-01 RX ORDER — I.V. FAT EMULSION 20 G/100ML
0.31 EMULSION INTRAVENOUS
Qty: 20 | Refills: 0 | Status: DISCONTINUED | OUTPATIENT
Start: 2023-01-01 | End: 2023-01-01

## 2023-01-01 RX ORDER — SODIUM CHLORIDE 9 MG/ML
500 INJECTION, SOLUTION INTRAVENOUS ONCE
Refills: 0 | Status: COMPLETED | OUTPATIENT
Start: 2023-01-01 | End: 2023-01-01

## 2023-01-01 RX ORDER — OXYCODONE HYDROCHLORIDE 5 MG/1
5 TABLET ORAL ONCE
Refills: 0 | Status: DISCONTINUED | OUTPATIENT
Start: 2023-01-01 | End: 2023-01-01

## 2023-01-01 RX ORDER — SODIUM CHLORIDE 9 MG/ML
1000 INJECTION, SOLUTION INTRAVENOUS ONCE
Refills: 0 | Status: COMPLETED | OUTPATIENT
Start: 2023-01-01 | End: 2023-01-01

## 2023-01-01 RX ORDER — HYDROMORPHONE HYDROCHLORIDE 2 MG/ML
0.5 INJECTION INTRAMUSCULAR; INTRAVENOUS; SUBCUTANEOUS ONCE
Refills: 0 | Status: DISCONTINUED | OUTPATIENT
Start: 2023-01-01 | End: 2023-01-01

## 2023-01-01 RX ORDER — SODIUM CHLORIDE 9 MG/ML
1000 INJECTION INTRAMUSCULAR; INTRAVENOUS; SUBCUTANEOUS ONCE
Refills: 0 | Status: COMPLETED | OUTPATIENT
Start: 2023-01-01 | End: 2023-01-01

## 2023-01-01 RX ORDER — DEXTROSE 50 % IN WATER 50 %
25 SYRINGE (ML) INTRAVENOUS ONCE
Refills: 0 | Status: DISCONTINUED | OUTPATIENT
Start: 2023-01-01 | End: 2023-01-01

## 2023-01-01 RX ORDER — IOHEXOL 300 MG/ML
30 INJECTION, SOLUTION INTRAVENOUS ONCE
Refills: 0 | Status: DISCONTINUED | OUTPATIENT
Start: 2023-01-01 | End: 2023-01-01

## 2023-01-01 RX ORDER — ACETAMINOPHEN 500 MG
1000 TABLET ORAL ONCE
Refills: 0 | Status: COMPLETED | OUTPATIENT
Start: 2023-01-01 | End: 2023-01-01

## 2023-01-01 RX ORDER — LANOLIN ALCOHOL/MO/W.PET/CERES
3 CREAM (GRAM) TOPICAL AT BEDTIME
Refills: 0 | Status: DISCONTINUED | OUTPATIENT
Start: 2023-01-01 | End: 2023-01-01

## 2023-01-01 RX ORDER — SODIUM CHLORIDE 9 MG/ML
1000 INJECTION, SOLUTION INTRAVENOUS
Refills: 0 | Status: DISCONTINUED | OUTPATIENT
Start: 2023-01-01 | End: 2023-01-01

## 2023-01-01 RX ORDER — SODIUM CHLORIDE 9 MG/ML
700 INJECTION, SOLUTION INTRAVENOUS ONCE
Refills: 0 | Status: COMPLETED | OUTPATIENT
Start: 2023-01-01 | End: 2023-01-01

## 2023-01-01 RX ORDER — SODIUM CHLORIDE 9 MG/ML
325 INJECTION, SOLUTION INTRAVENOUS ONCE
Refills: 0 | Status: COMPLETED | OUTPATIENT
Start: 2023-01-01 | End: 2023-01-01

## 2023-01-01 RX ORDER — HEPARIN SODIUM 5000 [USP'U]/ML
5000 INJECTION INTRAVENOUS; SUBCUTANEOUS EVERY 8 HOURS
Refills: 0 | Status: DISCONTINUED | OUTPATIENT
Start: 2023-01-01 | End: 2023-01-01

## 2023-01-01 RX ORDER — DIATRIZOATE MEGLUMINE 180 MG/ML
30 INJECTION, SOLUTION INTRAVESICAL ONCE
Refills: 0 | Status: COMPLETED | OUTPATIENT
Start: 2023-01-01 | End: 2023-01-01

## 2023-01-01 RX ORDER — ERTAPENEM SODIUM 1 G/1
1000 INJECTION, POWDER, LYOPHILIZED, FOR SOLUTION INTRAMUSCULAR; INTRAVENOUS EVERY 24 HOURS
Refills: 0 | Status: DISCONTINUED | OUTPATIENT
Start: 2023-01-01 | End: 2023-01-01

## 2023-01-01 RX ORDER — ELECTROLYTE SOLUTION,INJ
1 VIAL (ML) INTRAVENOUS
Refills: 0 | Status: DISCONTINUED | OUTPATIENT
Start: 2023-01-01 | End: 2023-01-01

## 2023-01-01 RX ORDER — SODIUM CHLORIDE 9 MG/ML
800 INJECTION, SOLUTION INTRAVENOUS ONCE
Refills: 0 | Status: COMPLETED | OUTPATIENT
Start: 2023-01-01 | End: 2023-01-01

## 2023-01-01 RX ORDER — PIPERACILLIN AND TAZOBACTAM 4; .5 G/20ML; G/20ML
3.38 INJECTION, POWDER, LYOPHILIZED, FOR SOLUTION INTRAVENOUS EVERY 8 HOURS
Refills: 0 | Status: COMPLETED | OUTPATIENT
Start: 2023-01-01 | End: 2023-01-01

## 2023-01-01 RX ORDER — HYDROMORPHONE HYDROCHLORIDE 2 MG/ML
0.5 INJECTION INTRAMUSCULAR; INTRAVENOUS; SUBCUTANEOUS
Refills: 0 | Status: DISCONTINUED | OUTPATIENT
Start: 2023-01-01 | End: 2023-01-01

## 2023-01-01 RX ORDER — AMIODARONE HYDROCHLORIDE 400 MG/1
100 TABLET ORAL EVERY 24 HOURS
Refills: 0 | Status: DISCONTINUED | OUTPATIENT
Start: 2023-01-01 | End: 2023-01-01

## 2023-01-01 RX ORDER — POTASSIUM CHLORIDE 20 MEQ
40 PACKET (EA) ORAL ONCE
Refills: 0 | Status: COMPLETED | OUTPATIENT
Start: 2023-01-01 | End: 2023-01-01

## 2023-01-01 RX ORDER — SODIUM CHLORIDE 9 MG/ML
250 INJECTION, SOLUTION INTRAVENOUS ONCE
Refills: 0 | Status: DISCONTINUED | OUTPATIENT
Start: 2023-01-01 | End: 2023-01-01

## 2023-01-01 RX ORDER — SODIUM CHLORIDE 9 MG/ML
100 INJECTION, SOLUTION INTRAVENOUS ONCE
Refills: 0 | Status: COMPLETED | OUTPATIENT
Start: 2023-01-01 | End: 2023-01-01

## 2023-01-01 RX ORDER — SODIUM CHLORIDE 9 MG/ML
500 INJECTION, SOLUTION INTRAVENOUS ONCE
Refills: 0 | Status: DISCONTINUED | OUTPATIENT
Start: 2023-01-01 | End: 2023-01-01

## 2023-01-01 RX ORDER — MAGNESIUM SULFATE 500 MG/ML
1 VIAL (ML) INJECTION ONCE
Refills: 0 | Status: COMPLETED | OUTPATIENT
Start: 2023-01-01 | End: 2023-01-01

## 2023-01-01 RX ORDER — CHOLESTYRAMINE 4 G/9G
2 POWDER, FOR SUSPENSION ORAL THREE TIMES A DAY
Refills: 0 | Status: DISCONTINUED | OUTPATIENT
Start: 2023-01-01 | End: 2023-01-01

## 2023-01-01 RX ORDER — I.V. FAT EMULSION 20 G/100ML
0.78 EMULSION INTRAVENOUS
Qty: 50 | Refills: 0 | Status: DISCONTINUED | OUTPATIENT
Start: 2023-01-01 | End: 2023-01-01

## 2023-01-01 RX ORDER — POTASSIUM PHOSPHATE, MONOBASIC POTASSIUM PHOSPHATE, DIBASIC 236; 224 MG/ML; MG/ML
15 INJECTION, SOLUTION INTRAVENOUS ONCE
Refills: 0 | Status: COMPLETED | OUTPATIENT
Start: 2023-01-01 | End: 2023-01-01

## 2023-01-01 RX ORDER — MORPHINE 10 MG/ML
6 SOLUTION ORAL
Refills: 0 | Status: DISCONTINUED | OUTPATIENT
Start: 2023-01-01 | End: 2023-01-01

## 2023-01-01 RX ORDER — SACUBITRIL AND VALSARTAN 24; 26 MG/1; MG/1
1 TABLET, FILM COATED ORAL
Refills: 0 | Status: DISCONTINUED | OUTPATIENT
Start: 2023-01-01 | End: 2023-01-01

## 2023-01-01 RX ORDER — SODIUM CHLORIDE 9 MG/ML
350 INJECTION, SOLUTION INTRAVENOUS ONCE
Refills: 0 | Status: COMPLETED | OUTPATIENT
Start: 2023-01-01 | End: 2023-01-01

## 2023-01-01 RX ORDER — VANCOMYCIN HCL 1 G
1000 VIAL (EA) INTRAVENOUS EVERY 12 HOURS
Refills: 0 | Status: DISCONTINUED | OUTPATIENT
Start: 2023-01-01 | End: 2023-01-01

## 2023-01-01 RX ORDER — HEPARIN SODIUM 5000 [USP'U]/ML
1200 INJECTION INTRAVENOUS; SUBCUTANEOUS
Qty: 25000 | Refills: 0 | Status: DISCONTINUED | OUTPATIENT
Start: 2023-01-01 | End: 2023-01-01

## 2023-01-01 RX ORDER — INSULIN LISPRO 100/ML
VIAL (ML) SUBCUTANEOUS EVERY 6 HOURS
Refills: 0 | Status: DISCONTINUED | OUTPATIENT
Start: 2023-01-01 | End: 2023-01-01

## 2023-01-01 RX ORDER — MAGNESIUM SULFATE 500 MG/ML
2 VIAL (ML) INJECTION ONCE
Refills: 0 | Status: COMPLETED | OUTPATIENT
Start: 2023-01-01 | End: 2023-01-01

## 2023-01-01 RX ORDER — AMIODARONE HYDROCHLORIDE 400 MG/1
0.5 TABLET ORAL
Qty: 450 | Refills: 0 | Status: DISCONTINUED | OUTPATIENT
Start: 2023-01-01 | End: 2023-01-01

## 2023-01-01 RX ORDER — MIRTAZAPINE 45 MG/1
15 TABLET, ORALLY DISINTEGRATING ORAL AT BEDTIME
Refills: 0 | Status: DISCONTINUED | OUTPATIENT
Start: 2023-01-01 | End: 2023-01-01

## 2023-01-01 RX ORDER — TIGECYCLINE 50 MG/5ML
50 INJECTION, POWDER, LYOPHILIZED, FOR SOLUTION INTRAVENOUS EVERY 12 HOURS
Refills: 0 | Status: DISCONTINUED | OUTPATIENT
Start: 2023-01-01 | End: 2023-01-01

## 2023-01-01 RX ORDER — SODIUM CHLORIDE 9 MG/ML
300 INJECTION, SOLUTION INTRAVENOUS ONCE
Refills: 0 | Status: COMPLETED | OUTPATIENT
Start: 2023-01-01 | End: 2023-01-01

## 2023-01-01 RX ORDER — I.V. FAT EMULSION 20 G/100ML
0.77 EMULSION INTRAVENOUS
Qty: 50 | Refills: 0 | Status: DISCONTINUED | OUTPATIENT
Start: 2023-01-01 | End: 2023-01-01

## 2023-01-01 RX ORDER — CHLORHEXIDINE GLUCONATE 213 G/1000ML
1 SOLUTION TOPICAL
Refills: 0 | Status: DISCONTINUED | OUTPATIENT
Start: 2023-01-01 | End: 2023-01-01

## 2023-01-01 RX ORDER — HYDROMORPHONE HYDROCHLORIDE 2 MG/ML
0.25 INJECTION INTRAMUSCULAR; INTRAVENOUS; SUBCUTANEOUS ONCE
Refills: 0 | Status: DISCONTINUED | OUTPATIENT
Start: 2023-01-01 | End: 2023-01-01

## 2023-01-01 RX ORDER — METOPROLOL TARTRATE 50 MG
5 TABLET ORAL ONCE
Refills: 0 | Status: DISCONTINUED | OUTPATIENT
Start: 2023-01-01 | End: 2023-01-01

## 2023-01-01 RX ORDER — PIPERACILLIN AND TAZOBACTAM 4; .5 G/20ML; G/20ML
3.38 INJECTION, POWDER, LYOPHILIZED, FOR SOLUTION INTRAVENOUS ONCE
Refills: 0 | Status: COMPLETED | OUTPATIENT
Start: 2023-01-01 | End: 2023-01-01

## 2023-01-01 RX ORDER — HYDROMORPHONE HYDROCHLORIDE 2 MG/ML
0.5 INJECTION INTRAMUSCULAR; INTRAVENOUS; SUBCUTANEOUS EVERY 4 HOURS
Refills: 0 | Status: DISCONTINUED | OUTPATIENT
Start: 2023-01-01 | End: 2023-01-01

## 2023-01-01 RX ORDER — HYDROMORPHONE HYDROCHLORIDE 2 MG/ML
0.25 INJECTION INTRAMUSCULAR; INTRAVENOUS; SUBCUTANEOUS DAILY
Refills: 0 | Status: DISCONTINUED | OUTPATIENT
Start: 2023-01-01 | End: 2023-01-01

## 2023-01-01 RX ORDER — ONDANSETRON 8 MG/1
4 TABLET, FILM COATED ORAL EVERY 6 HOURS
Refills: 0 | Status: DISCONTINUED | OUTPATIENT
Start: 2023-01-01 | End: 2023-01-01

## 2023-01-01 RX ORDER — LIDOCAINE 4 G/100G
1 CREAM TOPICAL ONCE
Refills: 0 | Status: COMPLETED | OUTPATIENT
Start: 2023-01-01 | End: 2023-01-01

## 2023-01-01 RX ORDER — CASPOFUNGIN ACETATE 7 MG/ML
50 INJECTION, POWDER, LYOPHILIZED, FOR SOLUTION INTRAVENOUS EVERY 24 HOURS
Refills: 0 | Status: DISCONTINUED | OUTPATIENT
Start: 2023-01-01 | End: 2023-01-01

## 2023-01-01 RX ORDER — HEPARIN SODIUM 5000 [USP'U]/ML
1500 INJECTION INTRAVENOUS; SUBCUTANEOUS
Qty: 25000 | Refills: 0 | Status: DISCONTINUED | OUTPATIENT
Start: 2023-01-01 | End: 2023-01-01

## 2023-01-01 RX ORDER — MEROPENEM 1 G/30ML
1000 INJECTION INTRAVENOUS EVERY 8 HOURS
Refills: 0 | Status: DISCONTINUED | OUTPATIENT
Start: 2023-01-01 | End: 2023-01-01

## 2023-01-01 RX ORDER — HYDROMORPHONE HYDROCHLORIDE 2 MG/ML
0.25 INJECTION INTRAMUSCULAR; INTRAVENOUS; SUBCUTANEOUS EVERY 4 HOURS
Refills: 0 | Status: DISCONTINUED | OUTPATIENT
Start: 2023-01-01 | End: 2023-01-01

## 2023-01-01 RX ORDER — SPIRONOLACTONE 25 MG/1
25 TABLET, FILM COATED ORAL DAILY
Refills: 0 | Status: DISCONTINUED | OUTPATIENT
Start: 2023-01-01 | End: 2023-01-01

## 2023-01-01 RX ORDER — LANOLIN ALCOHOL/MO/W.PET/CERES
5 CREAM (GRAM) TOPICAL AT BEDTIME
Refills: 0 | Status: DISCONTINUED | OUTPATIENT
Start: 2023-01-01 | End: 2023-01-01

## 2023-01-01 RX ORDER — SODIUM CHLORIDE 9 MG/ML
1200 INJECTION, SOLUTION INTRAVENOUS ONCE
Refills: 0 | Status: COMPLETED | OUTPATIENT
Start: 2023-01-01 | End: 2023-01-01

## 2023-01-01 RX ORDER — SODIUM CHLORIDE 9 MG/ML
1250 INJECTION, SOLUTION INTRAVENOUS ONCE
Refills: 0 | Status: COMPLETED | OUTPATIENT
Start: 2023-01-01 | End: 2023-01-01

## 2023-01-01 RX ORDER — PANTOPRAZOLE SODIUM 20 MG/1
40 TABLET, DELAYED RELEASE ORAL DAILY
Refills: 0 | Status: DISCONTINUED | OUTPATIENT
Start: 2023-01-01 | End: 2023-01-01

## 2023-01-01 RX ORDER — SODIUM CHLORIDE 9 MG/ML
650 INJECTION, SOLUTION INTRAVENOUS ONCE
Refills: 0 | Status: COMPLETED | OUTPATIENT
Start: 2023-01-01 | End: 2023-01-01

## 2023-01-01 RX ORDER — SODIUM CHLORIDE 9 MG/ML
500 INJECTION INTRAMUSCULAR; INTRAVENOUS; SUBCUTANEOUS ONCE
Refills: 0 | Status: COMPLETED | OUTPATIENT
Start: 2023-01-01 | End: 2023-01-01

## 2023-01-01 RX ORDER — SODIUM CHLORIDE 9 MG/ML
400 INJECTION, SOLUTION INTRAVENOUS ONCE
Refills: 0 | Status: COMPLETED | OUTPATIENT
Start: 2023-01-01 | End: 2023-01-01

## 2023-01-01 RX ORDER — OXYCODONE HYDROCHLORIDE 5 MG/1
5 TABLET ORAL EVERY 6 HOURS
Refills: 0 | Status: DISCONTINUED | OUTPATIENT
Start: 2023-01-01 | End: 2023-01-01

## 2023-01-01 RX ORDER — DEXTROSE 50 % IN WATER 50 %
15 SYRINGE (ML) INTRAVENOUS ONCE
Refills: 0 | Status: DISCONTINUED | OUTPATIENT
Start: 2023-01-01 | End: 2023-01-01

## 2023-01-01 RX ORDER — ENOXAPARIN SODIUM 100 MG/ML
60 INJECTION SUBCUTANEOUS EVERY 12 HOURS
Refills: 0 | Status: DISCONTINUED | OUTPATIENT
Start: 2023-01-01 | End: 2023-01-01

## 2023-01-01 RX ORDER — HEPARIN SODIUM 5000 [USP'U]/ML
1350 INJECTION INTRAVENOUS; SUBCUTANEOUS
Qty: 25000 | Refills: 0 | Status: DISCONTINUED | OUTPATIENT
Start: 2023-01-01 | End: 2023-01-01

## 2023-01-01 RX ORDER — SODIUM CHLORIDE 9 MG/ML
675 INJECTION, SOLUTION INTRAVENOUS ONCE
Refills: 0 | Status: COMPLETED | OUTPATIENT
Start: 2023-01-01 | End: 2023-01-01

## 2023-01-01 RX ORDER — DIATRIZOATE MEGLUMINE 180 MG/ML
150 INJECTION, SOLUTION INTRAVESICAL ONCE
Refills: 0 | Status: DISCONTINUED | OUTPATIENT
Start: 2023-01-01 | End: 2023-01-01

## 2023-01-01 RX ORDER — HEPARIN SODIUM 5000 [USP'U]/ML
1600 INJECTION INTRAVENOUS; SUBCUTANEOUS
Qty: 25000 | Refills: 0 | Status: DISCONTINUED | OUTPATIENT
Start: 2023-01-01 | End: 2023-01-01

## 2023-01-01 RX ORDER — DAPTOMYCIN 500 MG/10ML
500 INJECTION, POWDER, LYOPHILIZED, FOR SOLUTION INTRAVENOUS EVERY 24 HOURS
Refills: 0 | Status: DISCONTINUED | OUTPATIENT
Start: 2023-01-01 | End: 2023-01-01

## 2023-01-01 RX ORDER — CHLORHEXIDINE GLUCONATE 213 G/1000ML
1 SOLUTION TOPICAL DAILY
Refills: 0 | Status: DISCONTINUED | OUTPATIENT
Start: 2023-01-01 | End: 2023-01-01

## 2023-01-01 RX ORDER — DEXTROSE 10 % IN WATER 10 %
1000 INTRAVENOUS SOLUTION INTRAVENOUS
Refills: 0 | Status: DISCONTINUED | OUTPATIENT
Start: 2023-01-01 | End: 2023-01-01

## 2023-01-01 RX ORDER — METOPROLOL TARTRATE 50 MG
2.5 TABLET ORAL ONCE
Refills: 0 | Status: COMPLETED | OUTPATIENT
Start: 2023-01-01 | End: 2023-01-01

## 2023-01-01 RX ORDER — DAPTOMYCIN 500 MG/10ML
400 INJECTION, POWDER, LYOPHILIZED, FOR SOLUTION INTRAVENOUS ONCE
Refills: 0 | Status: COMPLETED | OUTPATIENT
Start: 2023-01-01 | End: 2023-01-01

## 2023-01-01 RX ORDER — SODIUM CHLORIDE 9 MG/ML
550 INJECTION, SOLUTION INTRAVENOUS ONCE
Refills: 0 | Status: COMPLETED | OUTPATIENT
Start: 2023-01-01 | End: 2023-01-01

## 2023-01-01 RX ORDER — PANTOPRAZOLE SODIUM 20 MG/1
40 TABLET, DELAYED RELEASE ORAL
Refills: 0 | Status: DISCONTINUED | OUTPATIENT
Start: 2023-01-01 | End: 2023-01-01

## 2023-01-01 RX ORDER — SODIUM CHLORIDE 9 MG/ML
750 INJECTION, SOLUTION INTRAVENOUS ONCE
Refills: 0 | Status: COMPLETED | OUTPATIENT
Start: 2023-01-01 | End: 2023-01-01

## 2023-01-01 RX ORDER — GLUCAGON INJECTION, SOLUTION 0.5 MG/.1ML
1 INJECTION, SOLUTION SUBCUTANEOUS ONCE
Refills: 0 | Status: DISCONTINUED | OUTPATIENT
Start: 2023-01-01 | End: 2023-01-01

## 2023-01-01 RX ORDER — OCTREOTIDE ACETATE 200 UG/ML
100 INJECTION, SOLUTION INTRAVENOUS; SUBCUTANEOUS EVERY 8 HOURS
Refills: 0 | Status: DISCONTINUED | OUTPATIENT
Start: 2023-01-01 | End: 2023-01-01

## 2023-01-01 RX ORDER — POVIDONE-IODINE 5 %
1 AEROSOL (ML) TOPICAL DAILY
Refills: 0 | Status: DISCONTINUED | OUTPATIENT
Start: 2023-01-01 | End: 2023-01-01

## 2023-01-01 RX ORDER — HEPARIN SODIUM 5000 [USP'U]/ML
1000 INJECTION INTRAVENOUS; SUBCUTANEOUS
Qty: 25000 | Refills: 0 | Status: DISCONTINUED | OUTPATIENT
Start: 2023-01-01 | End: 2023-01-01

## 2023-01-01 RX ORDER — OXYCODONE HYDROCHLORIDE 5 MG/1
5 TABLET ORAL EVERY 4 HOURS
Refills: 0 | Status: DISCONTINUED | OUTPATIENT
Start: 2023-01-01 | End: 2023-01-01

## 2023-01-01 RX ORDER — PIPERACILLIN AND TAZOBACTAM 4; .5 G/20ML; G/20ML
3.38 INJECTION, POWDER, LYOPHILIZED, FOR SOLUTION INTRAVENOUS EVERY 8 HOURS
Refills: 0 | Status: DISCONTINUED | OUTPATIENT
Start: 2023-01-01 | End: 2023-01-01

## 2023-01-01 RX ORDER — SODIUM CHLORIDE 9 MG/ML
1000 INJECTION INTRAMUSCULAR; INTRAVENOUS; SUBCUTANEOUS
Refills: 0 | Status: DISCONTINUED | OUTPATIENT
Start: 2023-01-01 | End: 2023-01-01

## 2023-01-01 RX ORDER — ACETAMINOPHEN 500 MG
1000 TABLET ORAL ONCE
Refills: 0 | Status: DISCONTINUED | OUTPATIENT
Start: 2023-01-01 | End: 2023-01-01

## 2023-01-01 RX ORDER — DAPTOMYCIN 500 MG/10ML
500 INJECTION, POWDER, LYOPHILIZED, FOR SOLUTION INTRAVENOUS EVERY 24 HOURS
Refills: 0 | Status: COMPLETED | OUTPATIENT
Start: 2023-01-01 | End: 2023-01-01

## 2023-01-01 RX ORDER — SODIUM CHLORIDE 9 MG/ML
750 INJECTION INTRAMUSCULAR; INTRAVENOUS; SUBCUTANEOUS ONCE
Refills: 0 | Status: COMPLETED | OUTPATIENT
Start: 2023-01-01 | End: 2023-01-01

## 2023-01-01 RX ORDER — HEPARIN SODIUM 5000 [USP'U]/ML
700 INJECTION INTRAVENOUS; SUBCUTANEOUS
Qty: 25000 | Refills: 0 | Status: DISCONTINUED | OUTPATIENT
Start: 2023-01-01 | End: 2023-01-01

## 2023-01-01 RX ORDER — FLUCONAZOLE 150 MG/1
400 TABLET ORAL EVERY 24 HOURS
Refills: 0 | Status: DISCONTINUED | OUTPATIENT
Start: 2023-01-01 | End: 2023-01-01

## 2023-01-01 RX ORDER — TIGECYCLINE 50 MG/5ML
INJECTION, POWDER, LYOPHILIZED, FOR SOLUTION INTRAVENOUS
Refills: 0 | Status: DISCONTINUED | OUTPATIENT
Start: 2023-01-01 | End: 2023-01-01

## 2023-01-01 RX ORDER — VANCOMYCIN HCL 1 G
1250 VIAL (EA) INTRAVENOUS EVERY 12 HOURS
Refills: 0 | Status: DISCONTINUED | OUTPATIENT
Start: 2023-01-01 | End: 2023-01-01

## 2023-01-01 RX ORDER — HEPARIN SODIUM 5000 [USP'U]/ML
1300 INJECTION INTRAVENOUS; SUBCUTANEOUS
Qty: 25000 | Refills: 0 | Status: DISCONTINUED | OUTPATIENT
Start: 2023-01-01 | End: 2023-01-01

## 2023-01-01 RX ORDER — DAPTOMYCIN 500 MG/10ML
INJECTION, POWDER, LYOPHILIZED, FOR SOLUTION INTRAVENOUS
Refills: 0 | Status: DISCONTINUED | OUTPATIENT
Start: 2023-01-01 | End: 2023-01-01

## 2023-01-01 RX ORDER — PSYLLIUM SEED (WITH DEXTROSE)
1 POWDER (GRAM) ORAL EVERY 8 HOURS
Refills: 0 | Status: DISCONTINUED | OUTPATIENT
Start: 2023-01-01 | End: 2023-01-01

## 2023-01-01 RX ORDER — METOPROLOL TARTRATE 50 MG
5 TABLET ORAL ONCE
Refills: 0 | Status: COMPLETED | OUTPATIENT
Start: 2023-01-01 | End: 2023-01-01

## 2023-01-01 RX ORDER — ERTAPENEM SODIUM 1 G/1
1000 INJECTION, POWDER, LYOPHILIZED, FOR SOLUTION INTRAMUSCULAR; INTRAVENOUS EVERY 24 HOURS
Refills: 0 | Status: COMPLETED | OUTPATIENT
Start: 2023-01-01 | End: 2023-01-01

## 2023-01-01 RX ORDER — ALBUMIN HUMAN 25 %
250 VIAL (ML) INTRAVENOUS ONCE
Refills: 0 | Status: COMPLETED | OUTPATIENT
Start: 2023-01-01 | End: 2023-01-01

## 2023-01-01 RX ORDER — DAPTOMYCIN 500 MG/10ML
500 INJECTION, POWDER, LYOPHILIZED, FOR SOLUTION INTRAVENOUS ONCE
Refills: 0 | Status: COMPLETED | OUTPATIENT
Start: 2023-01-01 | End: 2023-01-01

## 2023-01-01 RX ORDER — ONDANSETRON 8 MG/1
4 TABLET, FILM COATED ORAL ONCE
Refills: 0 | Status: COMPLETED | OUTPATIENT
Start: 2023-01-01 | End: 2023-01-01

## 2023-01-01 RX ORDER — OXYCODONE HYDROCHLORIDE 5 MG/1
10 TABLET ORAL EVERY 4 HOURS
Refills: 0 | Status: DISCONTINUED | OUTPATIENT
Start: 2023-01-01 | End: 2023-01-01

## 2023-01-01 RX ORDER — ENOXAPARIN SODIUM 100 MG/ML
65 INJECTION SUBCUTANEOUS EVERY 12 HOURS
Refills: 0 | Status: DISCONTINUED | OUTPATIENT
Start: 2023-01-01 | End: 2023-01-01

## 2023-01-01 RX ORDER — AMIODARONE HYDROCHLORIDE 400 MG/1
200 TABLET ORAL DAILY
Refills: 0 | Status: DISCONTINUED | OUTPATIENT
Start: 2023-01-01 | End: 2023-01-01

## 2023-01-01 RX ORDER — DRONABINOL 2.5 MG
5 CAPSULE ORAL EVERY 12 HOURS
Refills: 0 | Status: DISCONTINUED | OUTPATIENT
Start: 2023-01-01 | End: 2023-01-01

## 2023-01-01 RX ORDER — CASPOFUNGIN ACETATE 7 MG/ML
70 INJECTION, POWDER, LYOPHILIZED, FOR SOLUTION INTRAVENOUS ONCE
Refills: 0 | Status: COMPLETED | OUTPATIENT
Start: 2023-01-01 | End: 2023-01-01

## 2023-01-01 RX ORDER — NYSTATIN CREAM 100000 [USP'U]/G
1 CREAM TOPICAL
Refills: 0 | Status: DISCONTINUED | OUTPATIENT
Start: 2023-01-01 | End: 2023-01-01

## 2023-01-01 RX ORDER — PANTOPRAZOLE SODIUM 20 MG/1
40 TABLET, DELAYED RELEASE ORAL EVERY 12 HOURS
Refills: 0 | Status: DISCONTINUED | OUTPATIENT
Start: 2023-01-01 | End: 2023-01-01

## 2023-01-01 RX ORDER — OXYCODONE HYDROCHLORIDE 5 MG/1
10 TABLET ORAL ONCE
Refills: 0 | Status: DISCONTINUED | OUTPATIENT
Start: 2023-01-01 | End: 2023-01-01

## 2023-01-01 RX ORDER — MORPHINE 10 MG/ML
6 SOLUTION ORAL EVERY 12 HOURS
Refills: 0 | Status: DISCONTINUED | OUTPATIENT
Start: 2023-01-01 | End: 2023-01-01

## 2023-01-01 RX ORDER — AMIODARONE HYDROCHLORIDE 400 MG/1
150 TABLET ORAL ONCE
Refills: 0 | Status: COMPLETED | OUTPATIENT
Start: 2023-01-01 | End: 2023-01-01

## 2023-01-01 RX ORDER — METOPROLOL TARTRATE 50 MG
2.5 TABLET ORAL ONCE
Refills: 0 | Status: DISCONTINUED | OUTPATIENT
Start: 2023-01-01 | End: 2023-01-01

## 2023-01-01 RX ORDER — MIRTAZAPINE 45 MG/1
30 TABLET, ORALLY DISINTEGRATING ORAL AT BEDTIME
Refills: 0 | Status: DISCONTINUED | OUTPATIENT
Start: 2023-01-01 | End: 2023-01-01

## 2023-01-01 RX ORDER — POTASSIUM CHLORIDE 20 MEQ
10 PACKET (EA) ORAL ONCE
Refills: 0 | Status: COMPLETED | OUTPATIENT
Start: 2023-01-01 | End: 2023-01-01

## 2023-01-01 RX ORDER — HYDROMORPHONE HYDROCHLORIDE 2 MG/ML
0.5 INJECTION INTRAMUSCULAR; INTRAVENOUS; SUBCUTANEOUS EVERY 6 HOURS
Refills: 0 | Status: DISCONTINUED | OUTPATIENT
Start: 2023-01-01 | End: 2023-01-01

## 2023-01-01 RX ORDER — PANTOPRAZOLE SODIUM 20 MG/1
8 TABLET, DELAYED RELEASE ORAL
Qty: 80 | Refills: 0 | Status: DISCONTINUED | OUTPATIENT
Start: 2023-01-01 | End: 2023-01-01

## 2023-01-01 RX ORDER — PANTOPRAZOLE SODIUM 20 MG/1
40 TABLET, DELAYED RELEASE ORAL
Qty: 0 | Refills: 0 | DISCHARGE
Start: 2023-01-01

## 2023-01-01 RX ORDER — LOPERAMIDE HCL 2 MG
4 TABLET ORAL EVERY 8 HOURS
Refills: 0 | Status: DISCONTINUED | OUTPATIENT
Start: 2023-01-01 | End: 2023-01-01

## 2023-01-01 RX ORDER — AMIODARONE HYDROCHLORIDE 400 MG/1
1 TABLET ORAL
Qty: 450 | Refills: 0 | Status: DISCONTINUED | OUTPATIENT
Start: 2023-01-01 | End: 2023-01-01

## 2023-01-01 RX ORDER — DAPTOMYCIN 500 MG/10ML
400 INJECTION, POWDER, LYOPHILIZED, FOR SOLUTION INTRAVENOUS EVERY 24 HOURS
Refills: 0 | Status: DISCONTINUED | OUTPATIENT
Start: 2023-01-01 | End: 2023-01-01

## 2023-01-01 RX ORDER — METOPROLOL TARTRATE 50 MG
5 TABLET ORAL EVERY 6 HOURS
Refills: 0 | Status: DISCONTINUED | OUTPATIENT
Start: 2023-01-01 | End: 2023-01-01

## 2023-01-01 RX ORDER — HEPARIN SODIUM 5000 [USP'U]/ML
1400 INJECTION INTRAVENOUS; SUBCUTANEOUS
Qty: 25000 | Refills: 0 | Status: DISCONTINUED | OUTPATIENT
Start: 2023-01-01 | End: 2023-01-01

## 2023-01-01 RX ORDER — HYDROMORPHONE HYDROCHLORIDE 2 MG/ML
1 INJECTION INTRAMUSCULAR; INTRAVENOUS; SUBCUTANEOUS
Refills: 0 | Status: DISCONTINUED | OUTPATIENT
Start: 2023-01-01 | End: 2023-01-01

## 2023-01-01 RX ORDER — IBUPROFEN 200 MG
400 TABLET ORAL ONCE
Refills: 0 | Status: COMPLETED | OUTPATIENT
Start: 2023-01-01 | End: 2023-01-01

## 2023-01-01 RX ORDER — SODIUM CHLORIDE 9 MG/ML
200 INJECTION INTRAMUSCULAR; INTRAVENOUS; SUBCUTANEOUS ONCE
Refills: 0 | Status: COMPLETED | OUTPATIENT
Start: 2023-01-01 | End: 2023-01-01

## 2023-01-01 RX ORDER — SODIUM CHLORIDE 9 MG/ML
575 INJECTION, SOLUTION INTRAVENOUS ONCE
Refills: 0 | Status: COMPLETED | OUTPATIENT
Start: 2023-01-01 | End: 2023-01-01

## 2023-01-01 RX ORDER — DIPHENOXYLATE HCL/ATROPINE 2.5-.025MG
2 TABLET ORAL DAILY
Refills: 0 | Status: DISCONTINUED | OUTPATIENT
Start: 2023-01-01 | End: 2023-01-01

## 2023-01-01 RX ORDER — SODIUM ZIRCONIUM CYCLOSILICATE 10 G/10G
10 POWDER, FOR SUSPENSION ORAL ONCE
Refills: 0 | Status: COMPLETED | OUTPATIENT
Start: 2023-01-01 | End: 2023-01-01

## 2023-01-01 RX ORDER — DEXTROSE 50 % IN WATER 50 %
12.5 SYRINGE (ML) INTRAVENOUS ONCE
Refills: 0 | Status: DISCONTINUED | OUTPATIENT
Start: 2023-01-01 | End: 2023-01-01

## 2023-01-01 RX ORDER — VANCOMYCIN HCL 1 G
1250 VIAL (EA) INTRAVENOUS ONCE
Refills: 0 | Status: DISCONTINUED | OUTPATIENT
Start: 2023-01-01 | End: 2023-01-01

## 2023-01-01 RX ORDER — SODIUM CHLORIDE 9 MG/ML
600 INJECTION, SOLUTION INTRAVENOUS ONCE
Refills: 0 | Status: COMPLETED | OUTPATIENT
Start: 2023-01-01 | End: 2023-01-01

## 2023-01-01 RX ORDER — BENZOCAINE AND MENTHOL 5; 1 G/100ML; G/100ML
1 LIQUID ORAL EVERY 4 HOURS
Refills: 0 | Status: DISCONTINUED | OUTPATIENT
Start: 2023-01-01 | End: 2023-01-01

## 2023-01-01 RX ORDER — DIATRIZOATE MEGLUMINE 180 MG/ML
30 INJECTION, SOLUTION INTRAVESICAL ONCE
Refills: 0 | Status: DISCONTINUED | OUTPATIENT
Start: 2023-01-01 | End: 2023-01-01

## 2023-01-01 RX ORDER — ACETAMINOPHEN 500 MG
975 TABLET ORAL ONCE
Refills: 0 | Status: COMPLETED | OUTPATIENT
Start: 2023-01-01 | End: 2023-01-01

## 2023-01-01 RX ORDER — METOCLOPRAMIDE HCL 10 MG
5 TABLET ORAL ONCE
Refills: 0 | Status: COMPLETED | OUTPATIENT
Start: 2023-01-01 | End: 2023-01-01

## 2023-01-01 RX ORDER — NOREPINEPHRINE BITARTRATE/D5W 8 MG/250ML
0.05 PLASTIC BAG, INJECTION (ML) INTRAVENOUS
Qty: 8 | Refills: 0 | Status: DISCONTINUED | OUTPATIENT
Start: 2023-01-01 | End: 2023-01-01

## 2023-01-01 RX ORDER — SODIUM CHLORIDE 9 MG/ML
200 INJECTION, SOLUTION INTRAVENOUS ONCE
Refills: 0 | Status: COMPLETED | OUTPATIENT
Start: 2023-01-01 | End: 2023-01-01

## 2023-01-01 RX ORDER — LIDOCAINE 4 G/100G
1 CREAM TOPICAL DAILY
Refills: 0 | Status: DISCONTINUED | OUTPATIENT
Start: 2023-01-01 | End: 2023-01-01

## 2023-01-01 RX ORDER — TIGECYCLINE 50 MG/5ML
100 INJECTION, POWDER, LYOPHILIZED, FOR SOLUTION INTRAVENOUS ONCE
Refills: 0 | Status: COMPLETED | OUTPATIENT
Start: 2023-01-01 | End: 2023-01-01

## 2023-01-01 RX ORDER — DIPHENOXYLATE HCL/ATROPINE 2.5-.025MG
2 TABLET ORAL
Refills: 0 | Status: DISCONTINUED | OUTPATIENT
Start: 2023-01-01 | End: 2023-01-01

## 2023-01-01 RX ORDER — SODIUM CHLORIDE 9 MG/ML
10 INJECTION INTRAMUSCULAR; INTRAVENOUS; SUBCUTANEOUS EVERY 8 HOURS
Refills: 0 | Status: DISCONTINUED | OUTPATIENT
Start: 2023-01-01 | End: 2023-01-01

## 2023-01-01 RX ORDER — MEROPENEM 1 G/30ML
INJECTION INTRAVENOUS
Refills: 0 | Status: DISCONTINUED | OUTPATIENT
Start: 2023-01-01 | End: 2023-01-01

## 2023-01-01 RX ORDER — ACETAMINOPHEN 500 MG
650 TABLET ORAL EVERY 6 HOURS
Refills: 0 | Status: DISCONTINUED | OUTPATIENT
Start: 2023-01-01 | End: 2023-01-01

## 2023-01-01 RX ORDER — METOPROLOL TARTRATE 50 MG
25 TABLET ORAL DAILY
Refills: 0 | Status: DISCONTINUED | OUTPATIENT
Start: 2023-01-01 | End: 2023-01-01

## 2023-01-01 RX ORDER — SODIUM CHLORIDE 9 MG/ML
400 INJECTION INTRAMUSCULAR; INTRAVENOUS; SUBCUTANEOUS ONCE
Refills: 0 | Status: COMPLETED | OUTPATIENT
Start: 2023-01-01 | End: 2023-01-01

## 2023-01-01 RX ORDER — PIPERACILLIN AND TAZOBACTAM 4; .5 G/20ML; G/20ML
4.5 INJECTION, POWDER, LYOPHILIZED, FOR SOLUTION INTRAVENOUS EVERY 8 HOURS
Refills: 0 | Status: DISCONTINUED | OUTPATIENT
Start: 2023-01-01 | End: 2023-01-01

## 2023-01-01 RX ORDER — INSULIN LISPRO 100/ML
VIAL (ML) SUBCUTANEOUS
Refills: 0 | Status: DISCONTINUED | OUTPATIENT
Start: 2023-01-01 | End: 2023-01-01

## 2023-01-01 RX ORDER — DIPHENOXYLATE HCL/ATROPINE 2.5-.025MG
2 TABLET ORAL THREE TIMES A DAY
Refills: 0 | Status: DISCONTINUED | OUTPATIENT
Start: 2023-01-01 | End: 2023-01-01

## 2023-01-01 RX ORDER — CASPOFUNGIN ACETATE 7 MG/ML
INJECTION, POWDER, LYOPHILIZED, FOR SOLUTION INTRAVENOUS
Refills: 0 | Status: DISCONTINUED | OUTPATIENT
Start: 2023-01-01 | End: 2023-01-01

## 2023-01-01 RX ORDER — SODIUM CHLORIDE 9 MG/ML
1000 INJECTION, SOLUTION INTRAVENOUS ONCE
Refills: 0 | Status: DISCONTINUED | OUTPATIENT
Start: 2023-01-01 | End: 2023-01-01

## 2023-01-01 RX ORDER — ALBUMIN HUMAN 25 %
250 VIAL (ML) INTRAVENOUS
Refills: 0 | Status: DISCONTINUED | OUTPATIENT
Start: 2023-01-01 | End: 2023-01-01

## 2023-01-01 RX ORDER — SODIUM CHLORIDE 9 MG/ML
750 INJECTION, SOLUTION INTRAVENOUS ONCE
Refills: 0 | Status: DISCONTINUED | OUTPATIENT
Start: 2023-01-01 | End: 2023-01-01

## 2023-01-01 RX ORDER — MAGNESIUM SULFATE 500 MG/ML
2 VIAL (ML) INJECTION
Refills: 0 | Status: COMPLETED | OUTPATIENT
Start: 2023-01-01 | End: 2023-01-01

## 2023-01-01 RX ORDER — SODIUM CHLORIDE 9 MG/ML
900 INJECTION, SOLUTION INTRAVENOUS ONCE
Refills: 0 | Status: COMPLETED | OUTPATIENT
Start: 2023-01-01 | End: 2023-01-01

## 2023-01-01 RX ORDER — SODIUM CHLORIDE 9 MG/ML
450 INJECTION, SOLUTION INTRAVENOUS ONCE
Refills: 0 | Status: DISCONTINUED | OUTPATIENT
Start: 2023-01-01 | End: 2023-01-01

## 2023-01-01 RX ORDER — LANOLIN ALCOHOL/MO/W.PET/CERES
1 CREAM (GRAM) TOPICAL AT BEDTIME
Refills: 0 | Status: COMPLETED | OUTPATIENT
Start: 2023-01-01 | End: 2023-01-01

## 2023-01-01 RX ORDER — VANCOMYCIN HCL 1 G
1250 VIAL (EA) INTRAVENOUS EVERY 24 HOURS
Refills: 0 | Status: DISCONTINUED | OUTPATIENT
Start: 2023-01-01 | End: 2023-01-01

## 2023-01-01 RX ORDER — HEPARIN SODIUM 5000 [USP'U]/ML
1450 INJECTION INTRAVENOUS; SUBCUTANEOUS
Qty: 25000 | Refills: 0 | Status: DISCONTINUED | OUTPATIENT
Start: 2023-01-01 | End: 2023-01-01

## 2023-01-01 RX ORDER — SODIUM CHLORIDE 9 MG/ML
10 INJECTION INTRAMUSCULAR; INTRAVENOUS; SUBCUTANEOUS
Refills: 0 | Status: DISCONTINUED | OUTPATIENT
Start: 2023-01-01 | End: 2023-01-01

## 2023-01-01 RX ORDER — SODIUM CHLORIDE 9 MG/ML
150 INJECTION, SOLUTION INTRAVENOUS ONCE
Refills: 0 | Status: COMPLETED | OUTPATIENT
Start: 2023-01-01 | End: 2023-01-01

## 2023-01-01 RX ORDER — TOBRAMYCIN SULFATE 40 MG/ML
320 VIAL (ML) INJECTION EVERY 24 HOURS
Refills: 0 | Status: DISCONTINUED | OUTPATIENT
Start: 2023-01-01 | End: 2023-01-01

## 2023-01-01 RX ORDER — SODIUM CHLORIDE 9 MG/ML
500 INJECTION, SOLUTION INTRAVENOUS
Refills: 0 | Status: DISCONTINUED | OUTPATIENT
Start: 2023-01-01 | End: 2023-01-01

## 2023-01-01 RX ORDER — POTASSIUM CHLORIDE 20 MEQ
20 PACKET (EA) ORAL ONCE
Refills: 0 | Status: COMPLETED | OUTPATIENT
Start: 2023-01-01 | End: 2023-01-01

## 2023-01-01 RX ORDER — LABETALOL HCL 100 MG
5 TABLET ORAL ONCE
Refills: 0 | Status: COMPLETED | OUTPATIENT
Start: 2023-01-01 | End: 2023-01-01

## 2023-01-01 RX ORDER — SODIUM CHLORIDE 9 MG/ML
1300 INJECTION, SOLUTION INTRAVENOUS ONCE
Refills: 0 | Status: COMPLETED | OUTPATIENT
Start: 2023-01-01 | End: 2023-01-01

## 2023-01-01 RX ORDER — HEPARIN SODIUM 5000 [USP'U]/ML
INJECTION INTRAVENOUS; SUBCUTANEOUS
Qty: 25000 | Refills: 0 | Status: DISCONTINUED | OUTPATIENT
Start: 2023-01-01 | End: 2023-01-01

## 2023-01-01 RX ORDER — INSULIN LISPRO 100/ML
VIAL (ML) SUBCUTANEOUS AT BEDTIME
Refills: 0 | Status: DISCONTINUED | OUTPATIENT
Start: 2023-01-01 | End: 2023-01-01

## 2023-01-01 RX ORDER — I.V. FAT EMULSION 20 G/100ML
0.31 EMULSION INTRAVENOUS
Qty: 19.96 | Refills: 0 | Status: DISCONTINUED | OUTPATIENT
Start: 2023-01-01 | End: 2023-01-01

## 2023-01-01 RX ORDER — LIDOCAINE HCL 20 MG/ML
5 VIAL (ML) INJECTION ONCE
Refills: 0 | Status: COMPLETED | OUTPATIENT
Start: 2023-01-01 | End: 2023-01-01

## 2023-01-01 RX ORDER — NOREPINEPHRINE BITARTRATE/D5W 8 MG/250ML
0.1 PLASTIC BAG, INJECTION (ML) INTRAVENOUS
Qty: 8 | Refills: 0 | Status: DISCONTINUED | OUTPATIENT
Start: 2023-01-01 | End: 2023-01-01

## 2023-01-01 RX ORDER — SODIUM CHLORIDE 9 MG/ML
450 INJECTION, SOLUTION INTRAVENOUS ONCE
Refills: 0 | Status: COMPLETED | OUTPATIENT
Start: 2023-01-01 | End: 2023-01-01

## 2023-01-01 RX ADMIN — Medication 1 APPLICATION(S): at 12:55

## 2023-01-01 RX ADMIN — SODIUM CHLORIDE 600 MILLILITER(S): 9 INJECTION, SOLUTION INTRAVENOUS at 19:13

## 2023-01-01 RX ADMIN — Medication 400 MILLIGRAM(S): at 12:38

## 2023-01-01 RX ADMIN — AMIODARONE HYDROCHLORIDE 100 MILLIGRAM(S): 400 TABLET ORAL at 06:19

## 2023-01-01 RX ADMIN — Medication 25 MILLIGRAM(S): at 06:20

## 2023-01-01 RX ADMIN — PANTOPRAZOLE SODIUM 40 MILLIGRAM(S): 20 TABLET, DELAYED RELEASE ORAL at 21:15

## 2023-01-01 RX ADMIN — Medication 500 MILLIGRAM(S): at 11:09

## 2023-01-01 RX ADMIN — SODIUM CHLORIDE 10 MILLILITER(S): 9 INJECTION INTRAMUSCULAR; INTRAVENOUS; SUBCUTANEOUS at 06:04

## 2023-01-01 RX ADMIN — PANTOPRAZOLE SODIUM 40 MILLIGRAM(S): 20 TABLET, DELAYED RELEASE ORAL at 17:49

## 2023-01-01 RX ADMIN — Medication 5 MILLIGRAM(S): at 06:35

## 2023-01-01 RX ADMIN — HYDROMORPHONE HYDROCHLORIDE 0.5 MILLIGRAM(S): 2 INJECTION INTRAMUSCULAR; INTRAVENOUS; SUBCUTANEOUS at 18:10

## 2023-01-01 RX ADMIN — SODIUM CHLORIDE 300 MILLILITER(S): 9 INJECTION, SOLUTION INTRAVENOUS at 14:46

## 2023-01-01 RX ADMIN — MIRTAZAPINE 15 MILLIGRAM(S): 45 TABLET, ORALLY DISINTEGRATING ORAL at 22:15

## 2023-01-01 RX ADMIN — DIATRIZOATE MEGLUMINE 30 MILLILITER(S): 180 INJECTION, SOLUTION INTRAVESICAL at 13:00

## 2023-01-01 RX ADMIN — Medication 1 TABLET(S): at 12:22

## 2023-01-01 RX ADMIN — Medication 650 MILLIGRAM(S): at 22:54

## 2023-01-01 RX ADMIN — PANTOPRAZOLE SODIUM 40 MILLIGRAM(S): 20 TABLET, DELAYED RELEASE ORAL at 12:45

## 2023-01-01 RX ADMIN — Medication 4 MILLIGRAM(S): at 23:27

## 2023-01-01 RX ADMIN — Medication 25 MILLIGRAM(S): at 05:49

## 2023-01-01 RX ADMIN — PANTOPRAZOLE SODIUM 40 MILLIGRAM(S): 20 TABLET, DELAYED RELEASE ORAL at 06:39

## 2023-01-01 RX ADMIN — Medication 2 TABLET(S): at 22:43

## 2023-01-01 RX ADMIN — Medication 25 MILLIGRAM(S): at 05:26

## 2023-01-01 RX ADMIN — Medication 2 TABLET(S): at 06:00

## 2023-01-01 RX ADMIN — AMIODARONE HYDROCHLORIDE 16.7 MG/MIN: 400 TABLET ORAL at 14:04

## 2023-01-01 RX ADMIN — MORPHINE 6 MILLIGRAM(S): 10 SOLUTION ORAL at 06:01

## 2023-01-01 RX ADMIN — HYDROMORPHONE HYDROCHLORIDE 0.5 MILLIGRAM(S): 2 INJECTION INTRAMUSCULAR; INTRAVENOUS; SUBCUTANEOUS at 17:01

## 2023-01-01 RX ADMIN — Medication 400 MILLIGRAM(S): at 16:35

## 2023-01-01 RX ADMIN — Medication 500 MILLIGRAM(S): at 11:55

## 2023-01-01 RX ADMIN — Medication 4 MILLIGRAM(S): at 14:42

## 2023-01-01 RX ADMIN — Medication 650 MILLIGRAM(S): at 07:06

## 2023-01-01 RX ADMIN — PIPERACILLIN AND TAZOBACTAM 25 GRAM(S): 4; .5 INJECTION, POWDER, LYOPHILIZED, FOR SOLUTION INTRAVENOUS at 17:41

## 2023-01-01 RX ADMIN — Medication 1 TABLET(S): at 13:19

## 2023-01-01 RX ADMIN — Medication 1 EACH: at 18:13

## 2023-01-01 RX ADMIN — Medication 1 PACKET(S): at 13:01

## 2023-01-01 RX ADMIN — PANTOPRAZOLE SODIUM 40 MILLIGRAM(S): 20 TABLET, DELAYED RELEASE ORAL at 06:22

## 2023-01-01 RX ADMIN — SODIUM CHLORIDE 100 MILLILITER(S): 9 INJECTION, SOLUTION INTRAVENOUS at 23:14

## 2023-01-01 RX ADMIN — ERTAPENEM SODIUM 120 MILLIGRAM(S): 1 INJECTION, POWDER, LYOPHILIZED, FOR SOLUTION INTRAMUSCULAR; INTRAVENOUS at 12:39

## 2023-01-01 RX ADMIN — Medication 1 APPLICATION(S): at 12:13

## 2023-01-01 RX ADMIN — ZINC SULFATE TAB 220 MG (50 MG ZINC EQUIVALENT) 220 MILLIGRAM(S): 220 (50 ZN) TAB at 12:07

## 2023-01-01 RX ADMIN — SODIUM CHLORIDE 10 MILLILITER(S): 9 INJECTION INTRAMUSCULAR; INTRAVENOUS; SUBCUTANEOUS at 05:58

## 2023-01-01 RX ADMIN — Medication 100 GRAM(S): at 14:26

## 2023-01-01 RX ADMIN — Medication 5 MILLIGRAM(S): at 11:17

## 2023-01-01 RX ADMIN — SODIUM CHLORIDE 500 MILLILITER(S): 9 INJECTION, SOLUTION INTRAVENOUS at 14:15

## 2023-01-01 RX ADMIN — OXYCODONE HYDROCHLORIDE 5 MILLIGRAM(S): 5 TABLET ORAL at 16:13

## 2023-01-01 RX ADMIN — Medication 4 MILLIGRAM(S): at 06:19

## 2023-01-01 RX ADMIN — I.V. FAT EMULSION 20.8 GM/KG/DAY: 20 EMULSION INTRAVENOUS at 22:32

## 2023-01-01 RX ADMIN — SODIUM CHLORIDE 10 MILLILITER(S): 9 INJECTION INTRAMUSCULAR; INTRAVENOUS; SUBCUTANEOUS at 23:03

## 2023-01-01 RX ADMIN — Medication 500 MILLIGRAM(S): at 11:07

## 2023-01-01 RX ADMIN — ZINC SULFATE TAB 220 MG (50 MG ZINC EQUIVALENT) 220 MILLIGRAM(S): 220 (50 ZN) TAB at 11:48

## 2023-01-01 RX ADMIN — CHLORHEXIDINE GLUCONATE 1 APPLICATION(S): 213 SOLUTION TOPICAL at 05:50

## 2023-01-01 RX ADMIN — Medication 4 MILLIGRAM(S): at 03:57

## 2023-01-01 RX ADMIN — HYDROMORPHONE HYDROCHLORIDE 0.5 MILLIGRAM(S): 2 INJECTION INTRAMUSCULAR; INTRAVENOUS; SUBCUTANEOUS at 11:16

## 2023-01-01 RX ADMIN — PANTOPRAZOLE SODIUM 40 MILLIGRAM(S): 20 TABLET, DELAYED RELEASE ORAL at 17:25

## 2023-01-01 RX ADMIN — AMIODARONE HYDROCHLORIDE 100 MILLIGRAM(S): 400 TABLET ORAL at 05:59

## 2023-01-01 RX ADMIN — NYSTATIN CREAM 1 APPLICATION(S): 100000 CREAM TOPICAL at 06:21

## 2023-01-01 RX ADMIN — SODIUM CHLORIDE 10 MILLILITER(S): 9 INJECTION INTRAMUSCULAR; INTRAVENOUS; SUBCUTANEOUS at 21:52

## 2023-01-01 RX ADMIN — Medication 1 TABLET(S): at 12:39

## 2023-01-01 RX ADMIN — CHLORHEXIDINE GLUCONATE 1 APPLICATION(S): 213 SOLUTION TOPICAL at 06:14

## 2023-01-01 RX ADMIN — ZINC SULFATE TAB 220 MG (50 MG ZINC EQUIVALENT) 220 MILLIGRAM(S): 220 (50 ZN) TAB at 11:54

## 2023-01-01 RX ADMIN — HYDROMORPHONE HYDROCHLORIDE 0.5 MILLIGRAM(S): 2 INJECTION INTRAMUSCULAR; INTRAVENOUS; SUBCUTANEOUS at 03:39

## 2023-01-01 RX ADMIN — Medication 4 MILLIGRAM(S): at 14:48

## 2023-01-01 RX ADMIN — SODIUM CHLORIDE 10 MILLILITER(S): 9 INJECTION INTRAMUSCULAR; INTRAVENOUS; SUBCUTANEOUS at 06:25

## 2023-01-01 RX ADMIN — CHLORHEXIDINE GLUCONATE 1 APPLICATION(S): 213 SOLUTION TOPICAL at 06:27

## 2023-01-01 RX ADMIN — Medication 1000 MILLIGRAM(S): at 03:12

## 2023-01-01 RX ADMIN — TIGECYCLINE 105 MILLIGRAM(S): 50 INJECTION, POWDER, LYOPHILIZED, FOR SOLUTION INTRAVENOUS at 06:42

## 2023-01-01 RX ADMIN — Medication 1 EACH: at 19:15

## 2023-01-01 RX ADMIN — Medication 5 MILLIGRAM(S): at 23:05

## 2023-01-01 RX ADMIN — Medication 1000 MILLIGRAM(S): at 05:50

## 2023-01-01 RX ADMIN — Medication 2 TABLET(S): at 21:36

## 2023-01-01 RX ADMIN — CHLORHEXIDINE GLUCONATE 1 APPLICATION(S): 213 SOLUTION TOPICAL at 07:17

## 2023-01-01 RX ADMIN — Medication 4 MILLIGRAM(S): at 22:53

## 2023-01-01 RX ADMIN — Medication 650 MILLIGRAM(S): at 07:20

## 2023-01-01 RX ADMIN — HYDROMORPHONE HYDROCHLORIDE 0.5 MILLIGRAM(S): 2 INJECTION INTRAMUSCULAR; INTRAVENOUS; SUBCUTANEOUS at 23:14

## 2023-01-01 RX ADMIN — Medication 1 EACH: at 17:33

## 2023-01-01 RX ADMIN — HYDROMORPHONE HYDROCHLORIDE 0.25 MILLIGRAM(S): 2 INJECTION INTRAMUSCULAR; INTRAVENOUS; SUBCUTANEOUS at 23:00

## 2023-01-01 RX ADMIN — DIATRIZOATE MEGLUMINE 30 MILLILITER(S): 180 INJECTION, SOLUTION INTRAVESICAL at 17:49

## 2023-01-01 RX ADMIN — SODIUM CHLORIDE 500 MILLILITER(S): 9 INJECTION, SOLUTION INTRAVENOUS at 20:52

## 2023-01-01 RX ADMIN — Medication 2 TABLET(S): at 14:29

## 2023-01-01 RX ADMIN — OXYCODONE HYDROCHLORIDE 5 MILLIGRAM(S): 5 TABLET ORAL at 00:25

## 2023-01-01 RX ADMIN — FLUCONAZOLE 100 MILLIGRAM(S): 150 TABLET ORAL at 17:25

## 2023-01-01 RX ADMIN — SODIUM CHLORIDE 300 MILLILITER(S): 9 INJECTION, SOLUTION INTRAVENOUS at 14:56

## 2023-01-01 RX ADMIN — Medication 1000 MILLIGRAM(S): at 21:26

## 2023-01-01 RX ADMIN — MIRTAZAPINE 30 MILLIGRAM(S): 45 TABLET, ORALLY DISINTEGRATING ORAL at 21:28

## 2023-01-01 RX ADMIN — Medication 4 MILLIGRAM(S): at 05:53

## 2023-01-01 RX ADMIN — NYSTATIN CREAM 1 APPLICATION(S): 100000 CREAM TOPICAL at 05:00

## 2023-01-01 RX ADMIN — ZINC SULFATE TAB 220 MG (50 MG ZINC EQUIVALENT) 220 MILLIGRAM(S): 220 (50 ZN) TAB at 11:45

## 2023-01-01 RX ADMIN — Medication 2 TABLET(S): at 23:02

## 2023-01-01 RX ADMIN — AMIODARONE HYDROCHLORIDE 100 MILLIGRAM(S): 400 TABLET ORAL at 06:42

## 2023-01-01 RX ADMIN — SODIUM CHLORIDE 10 MILLILITER(S): 9 INJECTION INTRAMUSCULAR; INTRAVENOUS; SUBCUTANEOUS at 15:51

## 2023-01-01 RX ADMIN — Medication 650 MILLIGRAM(S): at 00:38

## 2023-01-01 RX ADMIN — PIPERACILLIN AND TAZOBACTAM 25 GRAM(S): 4; .5 INJECTION, POWDER, LYOPHILIZED, FOR SOLUTION INTRAVENOUS at 00:11

## 2023-01-01 RX ADMIN — SODIUM CHLORIDE 1000 MILLILITER(S): 9 INJECTION, SOLUTION INTRAVENOUS at 07:39

## 2023-01-01 RX ADMIN — Medication 1 EACH: at 18:40

## 2023-01-01 RX ADMIN — SODIUM CHLORIDE 10 MILLILITER(S): 9 INJECTION INTRAMUSCULAR; INTRAVENOUS; SUBCUTANEOUS at 21:20

## 2023-01-01 RX ADMIN — OXYCODONE HYDROCHLORIDE 5 MILLIGRAM(S): 5 TABLET ORAL at 06:02

## 2023-01-01 RX ADMIN — MIRTAZAPINE 30 MILLIGRAM(S): 45 TABLET, ORALLY DISINTEGRATING ORAL at 21:40

## 2023-01-01 RX ADMIN — SODIUM CHLORIDE 100 MILLILITER(S): 9 INJECTION, SOLUTION INTRAVENOUS at 20:00

## 2023-01-01 RX ADMIN — Medication 1000 MILLIGRAM(S): at 16:00

## 2023-01-01 RX ADMIN — Medication 1 APPLICATION(S): at 16:21

## 2023-01-01 RX ADMIN — SODIUM CHLORIDE 10 MILLILITER(S): 9 INJECTION INTRAMUSCULAR; INTRAVENOUS; SUBCUTANEOUS at 22:37

## 2023-01-01 RX ADMIN — Medication 1 TABLET(S): at 12:29

## 2023-01-01 RX ADMIN — DAPTOMYCIN 120 MILLIGRAM(S): 500 INJECTION, POWDER, LYOPHILIZED, FOR SOLUTION INTRAVENOUS at 05:00

## 2023-01-01 RX ADMIN — ZINC SULFATE TAB 220 MG (50 MG ZINC EQUIVALENT) 220 MILLIGRAM(S): 220 (50 ZN) TAB at 11:31

## 2023-01-01 RX ADMIN — HEPARIN SODIUM 14 UNIT(S)/HR: 5000 INJECTION INTRAVENOUS; SUBCUTANEOUS at 00:00

## 2023-01-01 RX ADMIN — CHLORHEXIDINE GLUCONATE 1 APPLICATION(S): 213 SOLUTION TOPICAL at 07:09

## 2023-01-01 RX ADMIN — MORPHINE 6 MILLIGRAM(S): 10 SOLUTION ORAL at 05:26

## 2023-01-01 RX ADMIN — Medication 4 MILLIGRAM(S): at 22:31

## 2023-01-01 RX ADMIN — Medication 650 MILLIGRAM(S): at 09:10

## 2023-01-01 RX ADMIN — Medication 4 MILLIGRAM(S): at 05:52

## 2023-01-01 RX ADMIN — HYDROMORPHONE HYDROCHLORIDE 0.25 MILLIGRAM(S): 2 INJECTION INTRAMUSCULAR; INTRAVENOUS; SUBCUTANEOUS at 15:44

## 2023-01-01 RX ADMIN — Medication 2 TABLET(S): at 07:17

## 2023-01-01 RX ADMIN — SODIUM CHLORIDE 10 MILLILITER(S): 9 INJECTION INTRAMUSCULAR; INTRAVENOUS; SUBCUTANEOUS at 05:28

## 2023-01-01 RX ADMIN — Medication 500 MILLIGRAM(S): at 11:28

## 2023-01-01 RX ADMIN — NYSTATIN CREAM 1 APPLICATION(S): 100000 CREAM TOPICAL at 18:21

## 2023-01-01 RX ADMIN — Medication 5 MILLIGRAM(S): at 00:18

## 2023-01-01 RX ADMIN — PANTOPRAZOLE SODIUM 40 MILLIGRAM(S): 20 TABLET, DELAYED RELEASE ORAL at 06:29

## 2023-01-01 RX ADMIN — Medication 650 MILLIGRAM(S): at 09:06

## 2023-01-01 RX ADMIN — Medication 500 MILLIGRAM(S): at 11:10

## 2023-01-01 RX ADMIN — Medication 4 MILLIGRAM(S): at 05:41

## 2023-01-01 RX ADMIN — HYDROMORPHONE HYDROCHLORIDE 0.25 MILLIGRAM(S): 2 INJECTION INTRAMUSCULAR; INTRAVENOUS; SUBCUTANEOUS at 08:32

## 2023-01-01 RX ADMIN — ZINC SULFATE TAB 220 MG (50 MG ZINC EQUIVALENT) 220 MILLIGRAM(S): 220 (50 ZN) TAB at 12:10

## 2023-01-01 RX ADMIN — SODIUM CHLORIDE 4000 MILLILITER(S): 9 INJECTION, SOLUTION INTRAVENOUS at 14:43

## 2023-01-01 RX ADMIN — Medication 1000 MILLIGRAM(S): at 23:31

## 2023-01-01 RX ADMIN — SODIUM CHLORIDE 10 MILLILITER(S): 9 INJECTION INTRAMUSCULAR; INTRAVENOUS; SUBCUTANEOUS at 21:54

## 2023-01-01 RX ADMIN — ENOXAPARIN SODIUM 60 MILLIGRAM(S): 100 INJECTION SUBCUTANEOUS at 22:32

## 2023-01-01 RX ADMIN — Medication 1 EACH: at 17:40

## 2023-01-01 RX ADMIN — Medication 1000 MILLIGRAM(S): at 04:15

## 2023-01-01 RX ADMIN — Medication 400 MILLIGRAM(S): at 23:58

## 2023-01-01 RX ADMIN — OXYCODONE HYDROCHLORIDE 5 MILLIGRAM(S): 5 TABLET ORAL at 22:03

## 2023-01-01 RX ADMIN — PIPERACILLIN AND TAZOBACTAM 25 GRAM(S): 4; .5 INJECTION, POWDER, LYOPHILIZED, FOR SOLUTION INTRAVENOUS at 08:49

## 2023-01-01 RX ADMIN — HYDROMORPHONE HYDROCHLORIDE 0.25 MILLIGRAM(S): 2 INJECTION INTRAMUSCULAR; INTRAVENOUS; SUBCUTANEOUS at 11:18

## 2023-01-01 RX ADMIN — PANTOPRAZOLE SODIUM 40 MILLIGRAM(S): 20 TABLET, DELAYED RELEASE ORAL at 22:02

## 2023-01-01 RX ADMIN — ERTAPENEM SODIUM 120 MILLIGRAM(S): 1 INJECTION, POWDER, LYOPHILIZED, FOR SOLUTION INTRAMUSCULAR; INTRAVENOUS at 09:15

## 2023-01-01 RX ADMIN — AMIODARONE HYDROCHLORIDE 200 MILLIGRAM(S): 400 TABLET ORAL at 06:41

## 2023-01-01 RX ADMIN — MORPHINE 6 MILLIGRAM(S): 10 SOLUTION ORAL at 00:33

## 2023-01-01 RX ADMIN — AMIODARONE HYDROCHLORIDE 100 MILLIGRAM(S): 400 TABLET ORAL at 05:57

## 2023-01-01 RX ADMIN — DAPTOMYCIN 120 MILLIGRAM(S): 500 INJECTION, POWDER, LYOPHILIZED, FOR SOLUTION INTRAVENOUS at 09:56

## 2023-01-01 RX ADMIN — HYDROMORPHONE HYDROCHLORIDE 0.25 MILLIGRAM(S): 2 INJECTION INTRAMUSCULAR; INTRAVENOUS; SUBCUTANEOUS at 13:50

## 2023-01-01 RX ADMIN — ZINC SULFATE TAB 220 MG (50 MG ZINC EQUIVALENT) 220 MILLIGRAM(S): 220 (50 ZN) TAB at 14:46

## 2023-01-01 RX ADMIN — SODIUM CHLORIDE 500 MILLILITER(S): 9 INJECTION, SOLUTION INTRAVENOUS at 21:53

## 2023-01-01 RX ADMIN — OXYCODONE HYDROCHLORIDE 5 MILLIGRAM(S): 5 TABLET ORAL at 20:15

## 2023-01-01 RX ADMIN — OXYCODONE HYDROCHLORIDE 5 MILLIGRAM(S): 5 TABLET ORAL at 03:57

## 2023-01-01 RX ADMIN — CHLORHEXIDINE GLUCONATE 1 APPLICATION(S): 213 SOLUTION TOPICAL at 05:48

## 2023-01-01 RX ADMIN — Medication 4 MILLIGRAM(S): at 17:02

## 2023-01-01 RX ADMIN — Medication 4 MILLIGRAM(S): at 22:17

## 2023-01-01 RX ADMIN — MORPHINE 6 MILLIGRAM(S): 10 SOLUTION ORAL at 06:54

## 2023-01-01 RX ADMIN — SODIUM CHLORIDE 1000 MILLILITER(S): 9 INJECTION, SOLUTION INTRAVENOUS at 19:12

## 2023-01-01 RX ADMIN — Medication 500 MILLIGRAM(S): at 11:41

## 2023-01-01 RX ADMIN — SODIUM CHLORIDE 500 MILLILITER(S): 9 INJECTION, SOLUTION INTRAVENOUS at 01:07

## 2023-01-01 RX ADMIN — Medication 100 GRAM(S): at 18:32

## 2023-01-01 RX ADMIN — ENOXAPARIN SODIUM 60 MILLIGRAM(S): 100 INJECTION SUBCUTANEOUS at 17:09

## 2023-01-01 RX ADMIN — Medication 4 MILLIGRAM(S): at 14:20

## 2023-01-01 RX ADMIN — Medication 1 PACKET(S): at 07:27

## 2023-01-01 RX ADMIN — Medication 650 MILLIGRAM(S): at 21:34

## 2023-01-01 RX ADMIN — Medication 25 MILLIGRAM(S): at 07:17

## 2023-01-01 RX ADMIN — Medication 5 MILLIGRAM(S): at 02:51

## 2023-01-01 RX ADMIN — CHOLESTYRAMINE 2 GRAM(S): 4 POWDER, FOR SUSPENSION ORAL at 06:45

## 2023-01-01 RX ADMIN — OXYCODONE HYDROCHLORIDE 5 MILLIGRAM(S): 5 TABLET ORAL at 23:46

## 2023-01-01 RX ADMIN — ENOXAPARIN SODIUM 60 MILLIGRAM(S): 100 INJECTION SUBCUTANEOUS at 05:57

## 2023-01-01 RX ADMIN — Medication 1 EACH: at 17:08

## 2023-01-01 RX ADMIN — Medication 1 PACKET(S): at 19:04

## 2023-01-01 RX ADMIN — Medication 2 TABLET(S): at 14:46

## 2023-01-01 RX ADMIN — ZINC SULFATE TAB 220 MG (50 MG ZINC EQUIVALENT) 220 MILLIGRAM(S): 220 (50 ZN) TAB at 11:11

## 2023-01-01 RX ADMIN — PANTOPRAZOLE SODIUM 40 MILLIGRAM(S): 20 TABLET, DELAYED RELEASE ORAL at 17:34

## 2023-01-01 RX ADMIN — Medication 4 MILLIGRAM(S): at 14:13

## 2023-01-01 RX ADMIN — Medication 25 MILLIGRAM(S): at 06:01

## 2023-01-01 RX ADMIN — I.V. FAT EMULSION 8.33 GM/KG/DAY: 20 EMULSION INTRAVENOUS at 22:50

## 2023-01-01 RX ADMIN — Medication 4 MILLIGRAM(S): at 14:01

## 2023-01-01 RX ADMIN — I.V. FAT EMULSION 20.83 GM/KG/DAY: 20 EMULSION INTRAVENOUS at 17:21

## 2023-01-01 RX ADMIN — CHLORHEXIDINE GLUCONATE 1 APPLICATION(S): 213 SOLUTION TOPICAL at 06:54

## 2023-01-01 RX ADMIN — Medication 4 MILLIGRAM(S): at 21:39

## 2023-01-01 RX ADMIN — Medication 1 APPLICATION(S): at 13:48

## 2023-01-01 RX ADMIN — HYDROMORPHONE HYDROCHLORIDE 0.5 MILLIGRAM(S): 2 INJECTION INTRAMUSCULAR; INTRAVENOUS; SUBCUTANEOUS at 13:29

## 2023-01-01 RX ADMIN — MIRTAZAPINE 15 MILLIGRAM(S): 45 TABLET, ORALLY DISINTEGRATING ORAL at 21:34

## 2023-01-01 RX ADMIN — Medication 25 MILLIGRAM(S): at 05:56

## 2023-01-01 RX ADMIN — HEPARIN SODIUM 15 UNIT(S)/HR: 5000 INJECTION INTRAVENOUS; SUBCUTANEOUS at 00:47

## 2023-01-01 RX ADMIN — SODIUM CHLORIDE 1000 MILLILITER(S): 9 INJECTION INTRAMUSCULAR; INTRAVENOUS; SUBCUTANEOUS at 01:50

## 2023-01-01 RX ADMIN — SODIUM CHLORIDE 10 MILLILITER(S): 9 INJECTION INTRAMUSCULAR; INTRAVENOUS; SUBCUTANEOUS at 13:29

## 2023-01-01 RX ADMIN — SODIUM CHLORIDE 50 MILLILITER(S): 9 INJECTION, SOLUTION INTRAVENOUS at 21:00

## 2023-01-01 RX ADMIN — SODIUM CHLORIDE 1000 MILLILITER(S): 9 INJECTION INTRAMUSCULAR; INTRAVENOUS; SUBCUTANEOUS at 19:23

## 2023-01-01 RX ADMIN — Medication 3 MILLIGRAM(S): at 00:03

## 2023-01-01 RX ADMIN — ENOXAPARIN SODIUM 65 MILLIGRAM(S): 100 INJECTION SUBCUTANEOUS at 05:27

## 2023-01-01 RX ADMIN — HYDROMORPHONE HYDROCHLORIDE 0.5 MILLIGRAM(S): 2 INJECTION INTRAMUSCULAR; INTRAVENOUS; SUBCUTANEOUS at 19:37

## 2023-01-01 RX ADMIN — DAPTOMYCIN 120 MILLIGRAM(S): 500 INJECTION, POWDER, LYOPHILIZED, FOR SOLUTION INTRAVENOUS at 06:31

## 2023-01-01 RX ADMIN — HYDROMORPHONE HYDROCHLORIDE 0.25 MILLIGRAM(S): 2 INJECTION INTRAMUSCULAR; INTRAVENOUS; SUBCUTANEOUS at 16:56

## 2023-01-01 RX ADMIN — SODIUM CHLORIDE 1000 MILLILITER(S): 9 INJECTION INTRAMUSCULAR; INTRAVENOUS; SUBCUTANEOUS at 00:22

## 2023-01-01 RX ADMIN — PANTOPRAZOLE SODIUM 40 MILLIGRAM(S): 20 TABLET, DELAYED RELEASE ORAL at 11:21

## 2023-01-01 RX ADMIN — OXYCODONE HYDROCHLORIDE 5 MILLIGRAM(S): 5 TABLET ORAL at 22:51

## 2023-01-01 RX ADMIN — PIPERACILLIN AND TAZOBACTAM 25 GRAM(S): 4; .5 INJECTION, POWDER, LYOPHILIZED, FOR SOLUTION INTRAVENOUS at 16:18

## 2023-01-01 RX ADMIN — Medication 650 MILLIGRAM(S): at 12:30

## 2023-01-01 RX ADMIN — ENOXAPARIN SODIUM 60 MILLIGRAM(S): 100 INJECTION SUBCUTANEOUS at 06:10

## 2023-01-01 RX ADMIN — LIDOCAINE 1 PATCH: 4 CREAM TOPICAL at 12:42

## 2023-01-01 RX ADMIN — Medication 650 MILLIGRAM(S): at 06:20

## 2023-01-01 RX ADMIN — AMIODARONE HYDROCHLORIDE 100 MILLIGRAM(S): 400 TABLET ORAL at 05:50

## 2023-01-01 RX ADMIN — MEROPENEM 100 MILLIGRAM(S): 1 INJECTION INTRAVENOUS at 04:16

## 2023-01-01 RX ADMIN — I.V. FAT EMULSION 20.83 GM/KG/DAY: 20 EMULSION INTRAVENOUS at 22:38

## 2023-01-01 RX ADMIN — AMIODARONE HYDROCHLORIDE 100 MILLIGRAM(S): 400 TABLET ORAL at 05:52

## 2023-01-01 RX ADMIN — HYDROMORPHONE HYDROCHLORIDE 0.5 MILLIGRAM(S): 2 INJECTION INTRAMUSCULAR; INTRAVENOUS; SUBCUTANEOUS at 12:45

## 2023-01-01 RX ADMIN — SODIUM CHLORIDE 250 MILLILITER(S): 9 INJECTION, SOLUTION INTRAVENOUS at 12:07

## 2023-01-01 RX ADMIN — SODIUM CHLORIDE 250 MILLILITER(S): 9 INJECTION, SOLUTION INTRAVENOUS at 16:00

## 2023-01-01 RX ADMIN — Medication 1000 MILLIGRAM(S): at 23:59

## 2023-01-01 RX ADMIN — I.V. FAT EMULSION 20.8 GM/KG/DAY: 20 EMULSION INTRAVENOUS at 21:24

## 2023-01-01 RX ADMIN — PANTOPRAZOLE SODIUM 40 MILLIGRAM(S): 20 TABLET, DELAYED RELEASE ORAL at 17:40

## 2023-01-01 RX ADMIN — Medication 1000 MILLIGRAM(S): at 13:05

## 2023-01-01 RX ADMIN — AMIODARONE HYDROCHLORIDE 100 MILLIGRAM(S): 400 TABLET ORAL at 06:26

## 2023-01-01 RX ADMIN — Medication 650 MILLIGRAM(S): at 13:42

## 2023-01-01 RX ADMIN — SODIUM CHLORIDE 800 MILLILITER(S): 9 INJECTION, SOLUTION INTRAVENOUS at 07:40

## 2023-01-01 RX ADMIN — MORPHINE 6 MILLIGRAM(S): 10 SOLUTION ORAL at 17:45

## 2023-01-01 RX ADMIN — Medication 1 TABLET(S): at 11:29

## 2023-01-01 RX ADMIN — PANTOPRAZOLE SODIUM 40 MILLIGRAM(S): 20 TABLET, DELAYED RELEASE ORAL at 06:35

## 2023-01-01 RX ADMIN — Medication 500 MILLIGRAM(S): at 12:05

## 2023-01-01 RX ADMIN — Medication 1 EACH: at 18:00

## 2023-01-01 RX ADMIN — Medication 500 MILLIGRAM(S): at 14:01

## 2023-01-01 RX ADMIN — MIRTAZAPINE 15 MILLIGRAM(S): 45 TABLET, ORALLY DISINTEGRATING ORAL at 22:03

## 2023-01-01 RX ADMIN — CHLORHEXIDINE GLUCONATE 1 APPLICATION(S): 213 SOLUTION TOPICAL at 11:29

## 2023-01-01 RX ADMIN — Medication 1000 MILLIGRAM(S): at 14:45

## 2023-01-01 RX ADMIN — Medication 2 TABLET(S): at 06:28

## 2023-01-01 RX ADMIN — CHLORHEXIDINE GLUCONATE 1 APPLICATION(S): 213 SOLUTION TOPICAL at 05:41

## 2023-01-01 RX ADMIN — PANTOPRAZOLE SODIUM 40 MILLIGRAM(S): 20 TABLET, DELAYED RELEASE ORAL at 12:39

## 2023-01-01 RX ADMIN — Medication 500 MILLIGRAM(S): at 11:48

## 2023-01-01 RX ADMIN — HYDROMORPHONE HYDROCHLORIDE 0.5 MILLIGRAM(S): 2 INJECTION INTRAMUSCULAR; INTRAVENOUS; SUBCUTANEOUS at 20:00

## 2023-01-01 RX ADMIN — HYDROMORPHONE HYDROCHLORIDE 0.25 MILLIGRAM(S): 2 INJECTION INTRAMUSCULAR; INTRAVENOUS; SUBCUTANEOUS at 16:05

## 2023-01-01 RX ADMIN — NYSTATIN CREAM 1 APPLICATION(S): 100000 CREAM TOPICAL at 06:35

## 2023-01-01 RX ADMIN — Medication 1 TABLET(S): at 11:48

## 2023-01-01 RX ADMIN — PIPERACILLIN AND TAZOBACTAM 25 GRAM(S): 4; .5 INJECTION, POWDER, LYOPHILIZED, FOR SOLUTION INTRAVENOUS at 17:00

## 2023-01-01 RX ADMIN — Medication 2 TABLET(S): at 13:31

## 2023-01-01 RX ADMIN — PANTOPRAZOLE SODIUM 40 MILLIGRAM(S): 20 TABLET, DELAYED RELEASE ORAL at 06:02

## 2023-01-01 RX ADMIN — MEROPENEM 100 MILLIGRAM(S): 1 INJECTION INTRAVENOUS at 19:14

## 2023-01-01 RX ADMIN — Medication 2 TABLET(S): at 07:07

## 2023-01-01 RX ADMIN — DAPTOMYCIN 120 MILLIGRAM(S): 500 INJECTION, POWDER, LYOPHILIZED, FOR SOLUTION INTRAVENOUS at 12:05

## 2023-01-01 RX ADMIN — Medication 25 MILLIGRAM(S): at 06:30

## 2023-01-01 RX ADMIN — Medication 1 APPLICATION(S): at 11:53

## 2023-01-01 RX ADMIN — SODIUM CHLORIDE 10 MILLILITER(S): 9 INJECTION INTRAMUSCULAR; INTRAVENOUS; SUBCUTANEOUS at 12:36

## 2023-01-01 RX ADMIN — AMIODARONE HYDROCHLORIDE 600 MILLIGRAM(S): 400 TABLET ORAL at 04:53

## 2023-01-01 RX ADMIN — Medication 1 TABLET(S): at 13:28

## 2023-01-01 RX ADMIN — Medication 1 TABLET(S): at 12:17

## 2023-01-01 RX ADMIN — ERTAPENEM SODIUM 120 MILLIGRAM(S): 1 INJECTION, POWDER, LYOPHILIZED, FOR SOLUTION INTRAMUSCULAR; INTRAVENOUS at 11:21

## 2023-01-01 RX ADMIN — MORPHINE 6 MILLIGRAM(S): 10 SOLUTION ORAL at 00:17

## 2023-01-01 RX ADMIN — SODIUM CHLORIDE 1000 MILLILITER(S): 9 INJECTION, SOLUTION INTRAVENOUS at 19:48

## 2023-01-01 RX ADMIN — Medication 1 EACH: at 17:17

## 2023-01-01 RX ADMIN — PANTOPRAZOLE SODIUM 40 MILLIGRAM(S): 20 TABLET, DELAYED RELEASE ORAL at 18:30

## 2023-01-01 RX ADMIN — Medication 1 EACH: at 18:03

## 2023-01-01 RX ADMIN — Medication 400 MILLIGRAM(S): at 05:46

## 2023-01-01 RX ADMIN — PIPERACILLIN AND TAZOBACTAM 25 GRAM(S): 4; .5 INJECTION, POWDER, LYOPHILIZED, FOR SOLUTION INTRAVENOUS at 17:02

## 2023-01-01 RX ADMIN — SODIUM CHLORIDE 500 MILLILITER(S): 9 INJECTION, SOLUTION INTRAVENOUS at 09:03

## 2023-01-01 RX ADMIN — SODIUM CHLORIDE 500 MILLILITER(S): 9 INJECTION, SOLUTION INTRAVENOUS at 20:29

## 2023-01-01 RX ADMIN — SODIUM CHLORIDE 10 MILLILITER(S): 9 INJECTION INTRAMUSCULAR; INTRAVENOUS; SUBCUTANEOUS at 12:26

## 2023-01-01 RX ADMIN — ZINC SULFATE TAB 220 MG (50 MG ZINC EQUIVALENT) 220 MILLIGRAM(S): 220 (50 ZN) TAB at 11:50

## 2023-01-01 RX ADMIN — Medication 1 EACH: at 18:29

## 2023-01-01 RX ADMIN — Medication 25 MILLIGRAM(S): at 05:58

## 2023-01-01 RX ADMIN — Medication 1 EACH: at 19:21

## 2023-01-01 RX ADMIN — ZINC SULFATE TAB 220 MG (50 MG ZINC EQUIVALENT) 220 MILLIGRAM(S): 220 (50 ZN) TAB at 13:48

## 2023-01-01 RX ADMIN — MORPHINE 6 MILLIGRAM(S): 10 SOLUTION ORAL at 12:05

## 2023-01-01 RX ADMIN — MIRTAZAPINE 30 MILLIGRAM(S): 45 TABLET, ORALLY DISINTEGRATING ORAL at 21:39

## 2023-01-01 RX ADMIN — I.V. FAT EMULSION 20.83 GM/KG/DAY: 20 EMULSION INTRAVENOUS at 19:04

## 2023-01-01 RX ADMIN — Medication 2: at 17:19

## 2023-01-01 RX ADMIN — Medication 4 MILLIGRAM(S): at 22:50

## 2023-01-01 RX ADMIN — Medication 5 MILLIGRAM(S): at 23:57

## 2023-01-01 RX ADMIN — Medication 5 MILLIGRAM(S): at 06:05

## 2023-01-01 RX ADMIN — Medication 4 MILLIGRAM(S): at 14:26

## 2023-01-01 RX ADMIN — Medication 4 MILLIGRAM(S): at 16:03

## 2023-01-01 RX ADMIN — Medication 116 MILLIGRAM(S): at 23:04

## 2023-01-01 RX ADMIN — I.V. FAT EMULSION 20.8 GM/KG/DAY: 20 EMULSION INTRAVENOUS at 19:00

## 2023-01-01 RX ADMIN — PANTOPRAZOLE SODIUM 40 MILLIGRAM(S): 20 TABLET, DELAYED RELEASE ORAL at 05:58

## 2023-01-01 RX ADMIN — SODIUM CHLORIDE 10 MILLILITER(S): 9 INJECTION INTRAMUSCULAR; INTRAVENOUS; SUBCUTANEOUS at 05:31

## 2023-01-01 RX ADMIN — CHLORHEXIDINE GLUCONATE 1 APPLICATION(S): 213 SOLUTION TOPICAL at 12:47

## 2023-01-01 RX ADMIN — HYDROMORPHONE HYDROCHLORIDE 0.25 MILLIGRAM(S): 2 INJECTION INTRAMUSCULAR; INTRAVENOUS; SUBCUTANEOUS at 21:57

## 2023-01-01 RX ADMIN — CHLORHEXIDINE GLUCONATE 1 APPLICATION(S): 213 SOLUTION TOPICAL at 12:26

## 2023-01-01 RX ADMIN — ENOXAPARIN SODIUM 60 MILLIGRAM(S): 100 INJECTION SUBCUTANEOUS at 22:16

## 2023-01-01 RX ADMIN — PANTOPRAZOLE SODIUM 40 MILLIGRAM(S): 20 TABLET, DELAYED RELEASE ORAL at 11:09

## 2023-01-01 RX ADMIN — CHLORHEXIDINE GLUCONATE 1 APPLICATION(S): 213 SOLUTION TOPICAL at 06:38

## 2023-01-01 RX ADMIN — SPIRONOLACTONE 25 MILLIGRAM(S): 25 TABLET, FILM COATED ORAL at 05:37

## 2023-01-01 RX ADMIN — Medication 400 MILLIGRAM(S): at 20:55

## 2023-01-01 RX ADMIN — MORPHINE 6 MILLIGRAM(S): 10 SOLUTION ORAL at 18:13

## 2023-01-01 RX ADMIN — MORPHINE 6 MILLIGRAM(S): 10 SOLUTION ORAL at 15:18

## 2023-01-01 RX ADMIN — HYDROMORPHONE HYDROCHLORIDE 0.25 MILLIGRAM(S): 2 INJECTION INTRAMUSCULAR; INTRAVENOUS; SUBCUTANEOUS at 03:11

## 2023-01-01 RX ADMIN — DAPTOMYCIN 120 MILLIGRAM(S): 500 INJECTION, POWDER, LYOPHILIZED, FOR SOLUTION INTRAVENOUS at 04:53

## 2023-01-01 RX ADMIN — Medication 2 TABLET(S): at 21:33

## 2023-01-01 RX ADMIN — DIATRIZOATE MEGLUMINE 30 MILLILITER(S): 180 INJECTION, SOLUTION INTRAVESICAL at 16:43

## 2023-01-01 RX ADMIN — Medication 5 MILLIGRAM(S): at 17:02

## 2023-01-01 RX ADMIN — Medication 650 MILLIGRAM(S): at 00:59

## 2023-01-01 RX ADMIN — SODIUM CHLORIDE 933.33 MILLILITER(S): 9 INJECTION, SOLUTION INTRAVENOUS at 23:08

## 2023-01-01 RX ADMIN — CHLORHEXIDINE GLUCONATE 1 APPLICATION(S): 213 SOLUTION TOPICAL at 06:04

## 2023-01-01 RX ADMIN — Medication 1 EACH: at 17:32

## 2023-01-01 RX ADMIN — PANTOPRAZOLE SODIUM 40 MILLIGRAM(S): 20 TABLET, DELAYED RELEASE ORAL at 05:50

## 2023-01-01 RX ADMIN — Medication 5 MILLIGRAM(S): at 11:10

## 2023-01-01 RX ADMIN — SODIUM CHLORIDE 500 MILLILITER(S): 9 INJECTION, SOLUTION INTRAVENOUS at 17:27

## 2023-01-01 RX ADMIN — Medication 4 MILLIGRAM(S): at 06:58

## 2023-01-01 RX ADMIN — I.V. FAT EMULSION 20.83 GM/KG/DAY: 20 EMULSION INTRAVENOUS at 19:15

## 2023-01-01 RX ADMIN — PANTOPRAZOLE SODIUM 40 MILLIGRAM(S): 20 TABLET, DELAYED RELEASE ORAL at 05:41

## 2023-01-01 RX ADMIN — Medication 500 MILLIGRAM(S): at 13:02

## 2023-01-01 RX ADMIN — ONDANSETRON 4 MILLIGRAM(S): 8 TABLET, FILM COATED ORAL at 07:58

## 2023-01-01 RX ADMIN — SODIUM CHLORIDE 1000 MILLILITER(S): 9 INJECTION, SOLUTION INTRAVENOUS at 17:43

## 2023-01-01 RX ADMIN — AMIODARONE HYDROCHLORIDE 100 MILLIGRAM(S): 400 TABLET ORAL at 05:21

## 2023-01-01 RX ADMIN — Medication 2 TABLET(S): at 16:03

## 2023-01-01 RX ADMIN — Medication 5 MILLIGRAM(S): at 09:22

## 2023-01-01 RX ADMIN — SODIUM CHLORIDE 900 MILLILITER(S): 9 INJECTION, SOLUTION INTRAVENOUS at 09:48

## 2023-01-01 RX ADMIN — I.V. FAT EMULSION 20.8 GM/KG/DAY: 20 EMULSION INTRAVENOUS at 22:53

## 2023-01-01 RX ADMIN — Medication 5 MILLIGRAM(S): at 19:29

## 2023-01-01 RX ADMIN — HYDROMORPHONE HYDROCHLORIDE 0.25 MILLIGRAM(S): 2 INJECTION INTRAMUSCULAR; INTRAVENOUS; SUBCUTANEOUS at 18:07

## 2023-01-01 RX ADMIN — SODIUM CHLORIDE 10 MILLILITER(S): 9 INJECTION INTRAMUSCULAR; INTRAVENOUS; SUBCUTANEOUS at 14:00

## 2023-01-01 RX ADMIN — Medication 1 APPLICATION(S): at 12:53

## 2023-01-01 RX ADMIN — SODIUM CHLORIDE 650 MILLILITER(S): 9 INJECTION, SOLUTION INTRAVENOUS at 11:54

## 2023-01-01 RX ADMIN — MORPHINE 6 MILLIGRAM(S): 10 SOLUTION ORAL at 05:21

## 2023-01-01 RX ADMIN — Medication 1 PACKET(S): at 22:25

## 2023-01-01 RX ADMIN — Medication 400 MILLIGRAM(S): at 01:04

## 2023-01-01 RX ADMIN — Medication 1 PACKET(S): at 22:43

## 2023-01-01 RX ADMIN — Medication 5 MILLIGRAM(S): at 18:32

## 2023-01-01 RX ADMIN — PANTOPRAZOLE SODIUM 40 MILLIGRAM(S): 20 TABLET, DELAYED RELEASE ORAL at 18:19

## 2023-01-01 RX ADMIN — MORPHINE 6 MILLIGRAM(S): 10 SOLUTION ORAL at 18:12

## 2023-01-01 RX ADMIN — Medication 4 MILLIGRAM(S): at 13:20

## 2023-01-01 RX ADMIN — SODIUM CHLORIDE 500 MILLILITER(S): 9 INJECTION, SOLUTION INTRAVENOUS at 00:17

## 2023-01-01 RX ADMIN — Medication 500 MILLIGRAM(S): at 13:00

## 2023-01-01 RX ADMIN — SODIUM CHLORIDE 100 MILLILITER(S): 9 INJECTION, SOLUTION INTRAVENOUS at 21:46

## 2023-01-01 RX ADMIN — SODIUM CHLORIDE 100 MILLILITER(S): 9 INJECTION, SOLUTION INTRAVENOUS at 21:37

## 2023-01-01 RX ADMIN — LIDOCAINE 1 PATCH: 4 CREAM TOPICAL at 13:24

## 2023-01-01 RX ADMIN — HYDROMORPHONE HYDROCHLORIDE 0.25 MILLIGRAM(S): 2 INJECTION INTRAMUSCULAR; INTRAVENOUS; SUBCUTANEOUS at 05:30

## 2023-01-01 RX ADMIN — ENOXAPARIN SODIUM 60 MILLIGRAM(S): 100 INJECTION SUBCUTANEOUS at 10:18

## 2023-01-01 RX ADMIN — CHLORHEXIDINE GLUCONATE 1 APPLICATION(S): 213 SOLUTION TOPICAL at 07:18

## 2023-01-01 RX ADMIN — MIRTAZAPINE 30 MILLIGRAM(S): 45 TABLET, ORALLY DISINTEGRATING ORAL at 22:51

## 2023-01-01 RX ADMIN — SODIUM CHLORIDE 1000 MILLILITER(S): 9 INJECTION, SOLUTION INTRAVENOUS at 23:00

## 2023-01-01 RX ADMIN — Medication 1 TABLET(S): at 11:44

## 2023-01-01 RX ADMIN — CHLORHEXIDINE GLUCONATE 1 APPLICATION(S): 213 SOLUTION TOPICAL at 05:53

## 2023-01-01 RX ADMIN — SODIUM CHLORIDE 10 MILLILITER(S): 9 INJECTION INTRAMUSCULAR; INTRAVENOUS; SUBCUTANEOUS at 05:49

## 2023-01-01 RX ADMIN — MORPHINE 6 MILLIGRAM(S): 10 SOLUTION ORAL at 19:15

## 2023-01-01 RX ADMIN — AMIODARONE HYDROCHLORIDE 100 MILLIGRAM(S): 400 TABLET ORAL at 07:08

## 2023-01-01 RX ADMIN — Medication 5 MILLIGRAM(S): at 13:23

## 2023-01-01 RX ADMIN — HYDROMORPHONE HYDROCHLORIDE 0.5 MILLIGRAM(S): 2 INJECTION INTRAMUSCULAR; INTRAVENOUS; SUBCUTANEOUS at 11:45

## 2023-01-01 RX ADMIN — SODIUM CHLORIDE 500 MILLILITER(S): 9 INJECTION, SOLUTION INTRAVENOUS at 09:41

## 2023-01-01 RX ADMIN — ZINC SULFATE TAB 220 MG (50 MG ZINC EQUIVALENT) 220 MILLIGRAM(S): 220 (50 ZN) TAB at 12:51

## 2023-01-01 RX ADMIN — ZINC SULFATE TAB 220 MG (50 MG ZINC EQUIVALENT) 220 MILLIGRAM(S): 220 (50 ZN) TAB at 18:09

## 2023-01-01 RX ADMIN — NYSTATIN CREAM 1 APPLICATION(S): 100000 CREAM TOPICAL at 05:29

## 2023-01-01 RX ADMIN — MORPHINE 6 MILLIGRAM(S): 10 SOLUTION ORAL at 05:52

## 2023-01-01 RX ADMIN — ENOXAPARIN SODIUM 60 MILLIGRAM(S): 100 INJECTION SUBCUTANEOUS at 17:49

## 2023-01-01 RX ADMIN — Medication 2 TABLET(S): at 06:41

## 2023-01-01 RX ADMIN — NYSTATIN CREAM 1 APPLICATION(S): 100000 CREAM TOPICAL at 06:05

## 2023-01-01 RX ADMIN — Medication 1 TABLET(S): at 10:42

## 2023-01-01 RX ADMIN — AMIODARONE HYDROCHLORIDE 100 MILLIGRAM(S): 400 TABLET ORAL at 05:29

## 2023-01-01 RX ADMIN — Medication 2 TABLET(S): at 05:48

## 2023-01-01 RX ADMIN — HYDROMORPHONE HYDROCHLORIDE 0.25 MILLIGRAM(S): 2 INJECTION INTRAMUSCULAR; INTRAVENOUS; SUBCUTANEOUS at 19:45

## 2023-01-01 RX ADMIN — SODIUM CHLORIDE 500 MILLILITER(S): 9 INJECTION, SOLUTION INTRAVENOUS at 10:12

## 2023-01-01 RX ADMIN — OXYCODONE HYDROCHLORIDE 5 MILLIGRAM(S): 5 TABLET ORAL at 06:30

## 2023-01-01 RX ADMIN — AMIODARONE HYDROCHLORIDE 100 MILLIGRAM(S): 400 TABLET ORAL at 06:44

## 2023-01-01 RX ADMIN — SODIUM CHLORIDE 1200 MILLILITER(S): 9 INJECTION, SOLUTION INTRAVENOUS at 17:35

## 2023-01-01 RX ADMIN — Medication 1 EACH: at 17:26

## 2023-01-01 RX ADMIN — Medication 650 MILLIGRAM(S): at 01:00

## 2023-01-01 RX ADMIN — Medication 25 MILLIGRAM(S): at 06:19

## 2023-01-01 RX ADMIN — DAPTOMYCIN 116 MILLIGRAM(S): 500 INJECTION, POWDER, LYOPHILIZED, FOR SOLUTION INTRAVENOUS at 19:55

## 2023-01-01 RX ADMIN — Medication 500 MILLIGRAM(S): at 12:28

## 2023-01-01 RX ADMIN — SODIUM CHLORIDE 10 MILLILITER(S): 9 INJECTION INTRAMUSCULAR; INTRAVENOUS; SUBCUTANEOUS at 21:00

## 2023-01-01 RX ADMIN — Medication 1 APPLICATION(S): at 12:09

## 2023-01-01 RX ADMIN — MORPHINE 6 MILLIGRAM(S): 10 SOLUTION ORAL at 06:24

## 2023-01-01 RX ADMIN — SODIUM CHLORIDE 10 MILLILITER(S): 9 INJECTION INTRAMUSCULAR; INTRAVENOUS; SUBCUTANEOUS at 14:27

## 2023-01-01 RX ADMIN — LIDOCAINE 1 PATCH: 4 CREAM TOPICAL at 17:28

## 2023-01-01 RX ADMIN — Medication 5 MILLIGRAM(S): at 05:01

## 2023-01-01 RX ADMIN — HEPARIN SODIUM 5000 UNIT(S): 5000 INJECTION INTRAVENOUS; SUBCUTANEOUS at 21:24

## 2023-01-01 RX ADMIN — OXYCODONE HYDROCHLORIDE 5 MILLIGRAM(S): 5 TABLET ORAL at 10:47

## 2023-01-01 RX ADMIN — Medication 4 MILLIGRAM(S): at 06:52

## 2023-01-01 RX ADMIN — Medication 1 APPLICATION(S): at 11:32

## 2023-01-01 RX ADMIN — NYSTATIN CREAM 1 APPLICATION(S): 100000 CREAM TOPICAL at 17:48

## 2023-01-01 RX ADMIN — ZINC SULFATE TAB 220 MG (50 MG ZINC EQUIVALENT) 220 MILLIGRAM(S): 220 (50 ZN) TAB at 12:05

## 2023-01-01 RX ADMIN — PIPERACILLIN AND TAZOBACTAM 25 GRAM(S): 4; .5 INJECTION, POWDER, LYOPHILIZED, FOR SOLUTION INTRAVENOUS at 16:08

## 2023-01-01 RX ADMIN — CHLORHEXIDINE GLUCONATE 1 APPLICATION(S): 213 SOLUTION TOPICAL at 09:50

## 2023-01-01 RX ADMIN — Medication 400 MILLIGRAM(S): at 11:32

## 2023-01-01 RX ADMIN — AMIODARONE HYDROCHLORIDE 100 MILLIGRAM(S): 400 TABLET ORAL at 05:00

## 2023-01-01 RX ADMIN — SACUBITRIL AND VALSARTAN 1 TABLET(S): 24; 26 TABLET, FILM COATED ORAL at 11:47

## 2023-01-01 RX ADMIN — HYDROMORPHONE HYDROCHLORIDE 0.5 MILLIGRAM(S): 2 INJECTION INTRAMUSCULAR; INTRAVENOUS; SUBCUTANEOUS at 05:39

## 2023-01-01 RX ADMIN — MORPHINE 6 MILLIGRAM(S): 10 SOLUTION ORAL at 06:19

## 2023-01-01 RX ADMIN — Medication 1 TABLET(S): at 11:17

## 2023-01-01 RX ADMIN — HEPARIN SODIUM 5000 UNIT(S): 5000 INJECTION INTRAVENOUS; SUBCUTANEOUS at 05:58

## 2023-01-01 RX ADMIN — Medication 62.5 MILLIMOLE(S): at 18:18

## 2023-01-01 RX ADMIN — SODIUM CHLORIDE 1000 MILLILITER(S): 9 INJECTION, SOLUTION INTRAVENOUS at 06:39

## 2023-01-01 RX ADMIN — Medication 4 MILLIGRAM(S): at 22:59

## 2023-01-01 RX ADMIN — SODIUM CHLORIDE 10 MILLILITER(S): 9 INJECTION INTRAMUSCULAR; INTRAVENOUS; SUBCUTANEOUS at 14:16

## 2023-01-01 RX ADMIN — Medication 1 TABLET(S): at 11:12

## 2023-01-01 RX ADMIN — Medication 400 MILLIGRAM(S): at 10:02

## 2023-01-01 RX ADMIN — ENOXAPARIN SODIUM 60 MILLIGRAM(S): 100 INJECTION SUBCUTANEOUS at 17:46

## 2023-01-01 RX ADMIN — Medication 2 TABLET(S): at 22:48

## 2023-01-01 RX ADMIN — SODIUM CHLORIDE 500 MILLILITER(S): 9 INJECTION, SOLUTION INTRAVENOUS at 15:29

## 2023-01-01 RX ADMIN — PANTOPRAZOLE SODIUM 40 MILLIGRAM(S): 20 TABLET, DELAYED RELEASE ORAL at 19:03

## 2023-01-01 RX ADMIN — Medication 5 MILLIGRAM(S): at 18:05

## 2023-01-01 RX ADMIN — Medication 1000 MILLIGRAM(S): at 01:21

## 2023-01-01 RX ADMIN — SODIUM CHLORIDE 10 MILLILITER(S): 9 INJECTION INTRAMUSCULAR; INTRAVENOUS; SUBCUTANEOUS at 22:00

## 2023-01-01 RX ADMIN — Medication 1 TABLET(S): at 12:03

## 2023-01-01 RX ADMIN — Medication 650 MILLIGRAM(S): at 15:03

## 2023-01-01 RX ADMIN — Medication 1 APPLICATION(S): at 13:24

## 2023-01-01 RX ADMIN — SODIUM CHLORIDE 10 MILLILITER(S): 9 INJECTION INTRAMUSCULAR; INTRAVENOUS; SUBCUTANEOUS at 21:48

## 2023-01-01 RX ADMIN — Medication 5 MILLIGRAM(S): at 13:49

## 2023-01-01 RX ADMIN — Medication 400 MILLIGRAM(S): at 14:42

## 2023-01-01 RX ADMIN — ENOXAPARIN SODIUM 65 MILLIGRAM(S): 100 INJECTION SUBCUTANEOUS at 18:10

## 2023-01-01 RX ADMIN — MORPHINE 6 MILLIGRAM(S): 10 SOLUTION ORAL at 18:41

## 2023-01-01 RX ADMIN — I.V. FAT EMULSION 20.83 GM/KG/DAY: 20 EMULSION INTRAVENOUS at 21:33

## 2023-01-01 RX ADMIN — Medication 4 MILLIGRAM(S): at 13:35

## 2023-01-01 RX ADMIN — LIDOCAINE 1 PATCH: 4 CREAM TOPICAL at 12:46

## 2023-01-01 RX ADMIN — DIATRIZOATE MEGLUMINE 30 MILLILITER(S): 180 INJECTION, SOLUTION INTRAVESICAL at 10:27

## 2023-01-01 RX ADMIN — SODIUM CHLORIDE 100 MILLILITER(S): 9 INJECTION, SOLUTION INTRAVENOUS at 08:23

## 2023-01-01 RX ADMIN — ZINC SULFATE TAB 220 MG (50 MG ZINC EQUIVALENT) 220 MILLIGRAM(S): 220 (50 ZN) TAB at 11:46

## 2023-01-01 RX ADMIN — MORPHINE 6 MILLIGRAM(S): 10 SOLUTION ORAL at 17:27

## 2023-01-01 RX ADMIN — Medication 5 MILLIGRAM(S): at 11:55

## 2023-01-01 RX ADMIN — HYDROMORPHONE HYDROCHLORIDE 0.25 MILLIGRAM(S): 2 INJECTION INTRAMUSCULAR; INTRAVENOUS; SUBCUTANEOUS at 23:20

## 2023-01-01 RX ADMIN — MORPHINE 6 MILLIGRAM(S): 10 SOLUTION ORAL at 06:32

## 2023-01-01 RX ADMIN — Medication 4 MILLIGRAM(S): at 15:12

## 2023-01-01 RX ADMIN — POTASSIUM PHOSPHATE, MONOBASIC POTASSIUM PHOSPHATE, DIBASIC 62.5 MILLIMOLE(S): 236; 224 INJECTION, SOLUTION INTRAVENOUS at 09:45

## 2023-01-01 RX ADMIN — PANTOPRAZOLE SODIUM 40 MILLIGRAM(S): 20 TABLET, DELAYED RELEASE ORAL at 09:42

## 2023-01-01 RX ADMIN — Medication 4 MILLIGRAM(S): at 06:04

## 2023-01-01 RX ADMIN — Medication 1000 MILLIGRAM(S): at 16:43

## 2023-01-01 RX ADMIN — ZINC SULFATE TAB 220 MG (50 MG ZINC EQUIVALENT) 220 MILLIGRAM(S): 220 (50 ZN) TAB at 11:10

## 2023-01-01 RX ADMIN — Medication 1 EACH: at 18:09

## 2023-01-01 RX ADMIN — Medication 2 TABLET(S): at 22:49

## 2023-01-01 RX ADMIN — HEPARIN SODIUM 15 UNIT(S)/HR: 5000 INJECTION INTRAVENOUS; SUBCUTANEOUS at 19:04

## 2023-01-01 RX ADMIN — HYDROMORPHONE HYDROCHLORIDE 0.5 MILLIGRAM(S): 2 INJECTION INTRAMUSCULAR; INTRAVENOUS; SUBCUTANEOUS at 15:35

## 2023-01-01 RX ADMIN — Medication 1 APPLICATION(S): at 12:59

## 2023-01-01 RX ADMIN — Medication 1 TABLET(S): at 11:41

## 2023-01-01 RX ADMIN — Medication 2 TABLET(S): at 13:51

## 2023-01-01 RX ADMIN — OXYCODONE HYDROCHLORIDE 5 MILLIGRAM(S): 5 TABLET ORAL at 07:28

## 2023-01-01 RX ADMIN — SODIUM CHLORIDE 1000 MILLILITER(S): 9 INJECTION, SOLUTION INTRAVENOUS at 16:47

## 2023-01-01 RX ADMIN — HEPARIN SODIUM 15 UNIT(S)/HR: 5000 INJECTION INTRAVENOUS; SUBCUTANEOUS at 06:34

## 2023-01-01 RX ADMIN — CHOLESTYRAMINE 2 GRAM(S): 4 POWDER, FOR SUSPENSION ORAL at 14:44

## 2023-01-01 RX ADMIN — Medication 650 MILLIGRAM(S): at 14:03

## 2023-01-01 RX ADMIN — Medication 400 MILLIGRAM(S): at 14:53

## 2023-01-01 RX ADMIN — Medication 1 TABLET(S): at 14:02

## 2023-01-01 RX ADMIN — Medication 975 MILLIGRAM(S): at 10:49

## 2023-01-01 RX ADMIN — PANTOPRAZOLE SODIUM 40 MILLIGRAM(S): 20 TABLET, DELAYED RELEASE ORAL at 18:56

## 2023-01-01 RX ADMIN — ENOXAPARIN SODIUM 65 MILLIGRAM(S): 100 INJECTION SUBCUTANEOUS at 05:36

## 2023-01-01 RX ADMIN — MORPHINE 6 MILLIGRAM(S): 10 SOLUTION ORAL at 06:06

## 2023-01-01 RX ADMIN — SODIUM CHLORIDE 1000 MILLILITER(S): 9 INJECTION, SOLUTION INTRAVENOUS at 00:12

## 2023-01-01 RX ADMIN — ENOXAPARIN SODIUM 65 MILLIGRAM(S): 100 INJECTION SUBCUTANEOUS at 17:35

## 2023-01-01 RX ADMIN — SODIUM CHLORIDE 100 MILLILITER(S): 9 INJECTION, SOLUTION INTRAVENOUS at 09:49

## 2023-01-01 RX ADMIN — SODIUM CHLORIDE 600 MILLILITER(S): 9 INJECTION, SOLUTION INTRAVENOUS at 05:38

## 2023-01-01 RX ADMIN — Medication 25 MILLIGRAM(S): at 06:07

## 2023-01-01 RX ADMIN — Medication 40 MILLIEQUIVALENT(S): at 12:29

## 2023-01-01 RX ADMIN — ENOXAPARIN SODIUM 60 MILLIGRAM(S): 100 INJECTION SUBCUTANEOUS at 12:10

## 2023-01-01 RX ADMIN — LIDOCAINE 1 PATCH: 4 CREAM TOPICAL at 02:10

## 2023-01-01 RX ADMIN — HYDROMORPHONE HYDROCHLORIDE 0.5 MILLIGRAM(S): 2 INJECTION INTRAMUSCULAR; INTRAVENOUS; SUBCUTANEOUS at 15:45

## 2023-01-01 RX ADMIN — Medication 650 MILLIGRAM(S): at 23:11

## 2023-01-01 RX ADMIN — Medication 2 TABLET(S): at 06:07

## 2023-01-01 RX ADMIN — Medication 1 APPLICATION(S): at 19:46

## 2023-01-01 RX ADMIN — PANTOPRAZOLE SODIUM 40 MILLIGRAM(S): 20 TABLET, DELAYED RELEASE ORAL at 18:45

## 2023-01-01 RX ADMIN — Medication 1 TABLET(S): at 11:30

## 2023-01-01 RX ADMIN — LIDOCAINE 1 PATCH: 4 CREAM TOPICAL at 18:00

## 2023-01-01 RX ADMIN — PANTOPRAZOLE SODIUM 40 MILLIGRAM(S): 20 TABLET, DELAYED RELEASE ORAL at 06:40

## 2023-01-01 RX ADMIN — ERTAPENEM SODIUM 120 MILLIGRAM(S): 1 INJECTION, POWDER, LYOPHILIZED, FOR SOLUTION INTRAMUSCULAR; INTRAVENOUS at 09:25

## 2023-01-01 RX ADMIN — Medication 400 MILLIGRAM(S): at 11:05

## 2023-01-01 RX ADMIN — NYSTATIN CREAM 1 APPLICATION(S): 100000 CREAM TOPICAL at 06:00

## 2023-01-01 RX ADMIN — SODIUM CHLORIDE 10 MILLILITER(S): 9 INJECTION INTRAMUSCULAR; INTRAVENOUS; SUBCUTANEOUS at 13:26

## 2023-01-01 RX ADMIN — ERTAPENEM SODIUM 120 MILLIGRAM(S): 1 INJECTION, POWDER, LYOPHILIZED, FOR SOLUTION INTRAMUSCULAR; INTRAVENOUS at 09:37

## 2023-01-01 RX ADMIN — Medication 4 MILLIGRAM(S): at 21:25

## 2023-01-01 RX ADMIN — MIRTAZAPINE 30 MILLIGRAM(S): 45 TABLET, ORALLY DISINTEGRATING ORAL at 21:22

## 2023-01-01 RX ADMIN — SODIUM CHLORIDE 1000 MILLILITER(S): 9 INJECTION, SOLUTION INTRAVENOUS at 02:05

## 2023-01-01 RX ADMIN — Medication 1 TABLET(S): at 11:28

## 2023-01-01 RX ADMIN — HEPARIN SODIUM 16 UNIT(S)/HR: 5000 INJECTION INTRAVENOUS; SUBCUTANEOUS at 05:00

## 2023-01-01 RX ADMIN — NYSTATIN CREAM 1 APPLICATION(S): 100000 CREAM TOPICAL at 06:04

## 2023-01-01 RX ADMIN — MIRTAZAPINE 30 MILLIGRAM(S): 45 TABLET, ORALLY DISINTEGRATING ORAL at 22:27

## 2023-01-01 RX ADMIN — Medication 1 PACKET(S): at 18:31

## 2023-01-01 RX ADMIN — Medication 650 MILLIGRAM(S): at 19:36

## 2023-01-01 RX ADMIN — Medication 4 MILLIGRAM(S): at 06:13

## 2023-01-01 RX ADMIN — SODIUM CHLORIDE 10 MILLILITER(S): 9 INJECTION INTRAMUSCULAR; INTRAVENOUS; SUBCUTANEOUS at 13:09

## 2023-01-01 RX ADMIN — CASPOFUNGIN ACETATE 260 MILLIGRAM(S): 7 INJECTION, POWDER, LYOPHILIZED, FOR SOLUTION INTRAVENOUS at 00:37

## 2023-01-01 RX ADMIN — PANTOPRAZOLE SODIUM 40 MILLIGRAM(S): 20 TABLET, DELAYED RELEASE ORAL at 06:14

## 2023-01-01 RX ADMIN — Medication 1 APPLICATION(S): at 11:44

## 2023-01-01 RX ADMIN — HYDROMORPHONE HYDROCHLORIDE 0.25 MILLIGRAM(S): 2 INJECTION INTRAMUSCULAR; INTRAVENOUS; SUBCUTANEOUS at 22:15

## 2023-01-01 RX ADMIN — Medication 4 MILLIGRAM(S): at 21:40

## 2023-01-01 RX ADMIN — SODIUM CHLORIDE 10 MILLILITER(S): 9 INJECTION INTRAMUSCULAR; INTRAVENOUS; SUBCUTANEOUS at 05:45

## 2023-01-01 RX ADMIN — Medication 5 MILLIGRAM(S): at 18:26

## 2023-01-01 RX ADMIN — MORPHINE 6 MILLIGRAM(S): 10 SOLUTION ORAL at 06:35

## 2023-01-01 RX ADMIN — Medication 1 APPLICATION(S): at 12:37

## 2023-01-01 RX ADMIN — Medication 1000 MILLIGRAM(S): at 12:54

## 2023-01-01 RX ADMIN — SODIUM CHLORIDE 1000 MILLILITER(S): 9 INJECTION, SOLUTION INTRAVENOUS at 10:39

## 2023-01-01 RX ADMIN — Medication 30 MILLILITER(S): at 10:24

## 2023-01-01 RX ADMIN — MORPHINE 6 MILLIGRAM(S): 10 SOLUTION ORAL at 18:23

## 2023-01-01 RX ADMIN — HYDROMORPHONE HYDROCHLORIDE 0.5 MILLIGRAM(S): 2 INJECTION INTRAMUSCULAR; INTRAVENOUS; SUBCUTANEOUS at 06:43

## 2023-01-01 RX ADMIN — AMIODARONE HYDROCHLORIDE 16.7 MG/MIN: 400 TABLET ORAL at 17:16

## 2023-01-01 RX ADMIN — MORPHINE 6 MILLIGRAM(S): 10 SOLUTION ORAL at 18:31

## 2023-01-01 RX ADMIN — Medication 1 PACKET(S): at 06:35

## 2023-01-01 RX ADMIN — CHLORHEXIDINE GLUCONATE 1 APPLICATION(S): 213 SOLUTION TOPICAL at 06:29

## 2023-01-01 RX ADMIN — AMIODARONE HYDROCHLORIDE 200 MILLIGRAM(S): 400 TABLET ORAL at 06:20

## 2023-01-01 RX ADMIN — Medication 400 MILLIGRAM(S): at 23:36

## 2023-01-01 RX ADMIN — CHLORHEXIDINE GLUCONATE 1 APPLICATION(S): 213 SOLUTION TOPICAL at 11:22

## 2023-01-01 RX ADMIN — Medication 400 MILLIGRAM(S): at 15:49

## 2023-01-01 RX ADMIN — HYDROMORPHONE HYDROCHLORIDE 0.25 MILLIGRAM(S): 2 INJECTION INTRAMUSCULAR; INTRAVENOUS; SUBCUTANEOUS at 12:42

## 2023-01-01 RX ADMIN — HYDROMORPHONE HYDROCHLORIDE 0.5 MILLIGRAM(S): 2 INJECTION INTRAMUSCULAR; INTRAVENOUS; SUBCUTANEOUS at 11:46

## 2023-01-01 RX ADMIN — Medication 83 EACH: at 20:35

## 2023-01-01 RX ADMIN — OXYCODONE HYDROCHLORIDE 5 MILLIGRAM(S): 5 TABLET ORAL at 06:32

## 2023-01-01 RX ADMIN — PANTOPRAZOLE SODIUM 40 MILLIGRAM(S): 20 TABLET, DELAYED RELEASE ORAL at 17:58

## 2023-01-01 RX ADMIN — SODIUM CHLORIDE 100 MILLILITER(S): 9 INJECTION, SOLUTION INTRAVENOUS at 00:20

## 2023-01-01 RX ADMIN — Medication 2: at 00:07

## 2023-01-01 RX ADMIN — Medication 1 EACH: at 17:41

## 2023-01-01 RX ADMIN — ERTAPENEM SODIUM 120 MILLIGRAM(S): 1 INJECTION, POWDER, LYOPHILIZED, FOR SOLUTION INTRAMUSCULAR; INTRAVENOUS at 12:19

## 2023-01-01 RX ADMIN — Medication 2 TABLET(S): at 22:38

## 2023-01-01 RX ADMIN — PANTOPRAZOLE SODIUM 40 MILLIGRAM(S): 20 TABLET, DELAYED RELEASE ORAL at 19:19

## 2023-01-01 RX ADMIN — Medication 2 TABLET(S): at 23:06

## 2023-01-01 RX ADMIN — Medication 400 MILLIGRAM(S): at 14:39

## 2023-01-01 RX ADMIN — Medication 100 GRAM(S): at 11:20

## 2023-01-01 RX ADMIN — HYDROMORPHONE HYDROCHLORIDE 0.25 MILLIGRAM(S): 2 INJECTION INTRAMUSCULAR; INTRAVENOUS; SUBCUTANEOUS at 08:51

## 2023-01-01 RX ADMIN — Medication 1 EACH: at 18:49

## 2023-01-01 RX ADMIN — OXYCODONE HYDROCHLORIDE 5 MILLIGRAM(S): 5 TABLET ORAL at 11:00

## 2023-01-01 RX ADMIN — I.V. FAT EMULSION 8.33 GM/KG/DAY: 20 EMULSION INTRAVENOUS at 23:30

## 2023-01-01 RX ADMIN — Medication 650 MILLIGRAM(S): at 21:55

## 2023-01-01 RX ADMIN — MORPHINE 6 MILLIGRAM(S): 10 SOLUTION ORAL at 22:17

## 2023-01-01 RX ADMIN — ENOXAPARIN SODIUM 60 MILLIGRAM(S): 100 INJECTION SUBCUTANEOUS at 22:52

## 2023-01-01 RX ADMIN — OXYCODONE HYDROCHLORIDE 5 MILLIGRAM(S): 5 TABLET ORAL at 09:06

## 2023-01-01 RX ADMIN — Medication 4 MILLIGRAM(S): at 21:51

## 2023-01-01 RX ADMIN — HYDROMORPHONE HYDROCHLORIDE 0.25 MILLIGRAM(S): 2 INJECTION INTRAMUSCULAR; INTRAVENOUS; SUBCUTANEOUS at 06:45

## 2023-01-01 RX ADMIN — CHOLESTYRAMINE 2 GRAM(S): 4 POWDER, FOR SUSPENSION ORAL at 14:21

## 2023-01-01 RX ADMIN — Medication 2 TABLET(S): at 13:46

## 2023-01-01 RX ADMIN — HEPARIN SODIUM 5000 UNIT(S): 5000 INJECTION INTRAVENOUS; SUBCUTANEOUS at 14:17

## 2023-01-01 RX ADMIN — OXYCODONE HYDROCHLORIDE 5 MILLIGRAM(S): 5 TABLET ORAL at 12:35

## 2023-01-01 RX ADMIN — PANTOPRAZOLE SODIUM 40 MILLIGRAM(S): 20 TABLET, DELAYED RELEASE ORAL at 06:19

## 2023-01-01 RX ADMIN — ENOXAPARIN SODIUM 65 MILLIGRAM(S): 100 INJECTION SUBCUTANEOUS at 17:16

## 2023-01-01 RX ADMIN — HEPARIN SODIUM 16 UNIT(S)/HR: 5000 INJECTION INTRAVENOUS; SUBCUTANEOUS at 17:16

## 2023-01-01 RX ADMIN — MIRTAZAPINE 30 MILLIGRAM(S): 45 TABLET, ORALLY DISINTEGRATING ORAL at 21:31

## 2023-01-01 RX ADMIN — DAPTOMYCIN 120 MILLIGRAM(S): 500 INJECTION, POWDER, LYOPHILIZED, FOR SOLUTION INTRAVENOUS at 11:43

## 2023-01-01 RX ADMIN — CHOLESTYRAMINE 2 GRAM(S): 4 POWDER, FOR SUSPENSION ORAL at 05:27

## 2023-01-01 RX ADMIN — Medication 650 MILLIGRAM(S): at 19:52

## 2023-01-01 RX ADMIN — Medication 4 MILLIGRAM(S): at 05:50

## 2023-01-01 RX ADMIN — HYDROMORPHONE HYDROCHLORIDE 0.25 MILLIGRAM(S): 2 INJECTION INTRAMUSCULAR; INTRAVENOUS; SUBCUTANEOUS at 23:05

## 2023-01-01 RX ADMIN — Medication 650 MILLIGRAM(S): at 00:00

## 2023-01-01 RX ADMIN — Medication 1 PACKET(S): at 13:41

## 2023-01-01 RX ADMIN — HYDROMORPHONE HYDROCHLORIDE 0.5 MILLIGRAM(S): 2 INJECTION INTRAMUSCULAR; INTRAVENOUS; SUBCUTANEOUS at 05:19

## 2023-01-01 RX ADMIN — Medication 500 MILLIGRAM(S): at 11:49

## 2023-01-01 RX ADMIN — Medication 1 EACH: at 17:56

## 2023-01-01 RX ADMIN — ERTAPENEM SODIUM 120 MILLIGRAM(S): 1 INJECTION, POWDER, LYOPHILIZED, FOR SOLUTION INTRAMUSCULAR; INTRAVENOUS at 09:42

## 2023-01-01 RX ADMIN — ZINC SULFATE TAB 220 MG (50 MG ZINC EQUIVALENT) 220 MILLIGRAM(S): 220 (50 ZN) TAB at 14:24

## 2023-01-01 RX ADMIN — SODIUM CHLORIDE 1000 MILLILITER(S): 9 INJECTION, SOLUTION INTRAVENOUS at 23:16

## 2023-01-01 RX ADMIN — MORPHINE 6 MILLIGRAM(S): 10 SOLUTION ORAL at 06:48

## 2023-01-01 RX ADMIN — PANTOPRAZOLE SODIUM 40 MILLIGRAM(S): 20 TABLET, DELAYED RELEASE ORAL at 17:36

## 2023-01-01 RX ADMIN — Medication 5 MILLIGRAM(S): at 12:52

## 2023-01-01 RX ADMIN — ENOXAPARIN SODIUM 60 MILLIGRAM(S): 100 INJECTION SUBCUTANEOUS at 12:11

## 2023-01-01 RX ADMIN — CHOLESTYRAMINE 2 GRAM(S): 4 POWDER, FOR SUSPENSION ORAL at 05:33

## 2023-01-01 RX ADMIN — MIRTAZAPINE 30 MILLIGRAM(S): 45 TABLET, ORALLY DISINTEGRATING ORAL at 00:02

## 2023-01-01 RX ADMIN — PANTOPRAZOLE SODIUM 40 MILLIGRAM(S): 20 TABLET, DELAYED RELEASE ORAL at 19:29

## 2023-01-01 RX ADMIN — Medication 250 MILLIGRAM(S): at 06:35

## 2023-01-01 RX ADMIN — Medication 400 MILLIGRAM(S): at 04:00

## 2023-01-01 RX ADMIN — ZINC SULFATE TAB 220 MG (50 MG ZINC EQUIVALENT) 220 MILLIGRAM(S): 220 (50 ZN) TAB at 14:02

## 2023-01-01 RX ADMIN — CHLORHEXIDINE GLUCONATE 1 APPLICATION(S): 213 SOLUTION TOPICAL at 12:36

## 2023-01-01 RX ADMIN — ZINC SULFATE TAB 220 MG (50 MG ZINC EQUIVALENT) 220 MILLIGRAM(S): 220 (50 ZN) TAB at 13:08

## 2023-01-01 RX ADMIN — SACUBITRIL AND VALSARTAN 1 TABLET(S): 24; 26 TABLET, FILM COATED ORAL at 22:21

## 2023-01-01 RX ADMIN — SODIUM CHLORIDE 10 MILLILITER(S): 9 INJECTION INTRAMUSCULAR; INTRAVENOUS; SUBCUTANEOUS at 05:00

## 2023-01-01 RX ADMIN — Medication 1 PACKET(S): at 06:43

## 2023-01-01 RX ADMIN — Medication 5 MILLIGRAM(S): at 00:09

## 2023-01-01 RX ADMIN — Medication 2 TABLET(S): at 15:13

## 2023-01-01 RX ADMIN — HYDROMORPHONE HYDROCHLORIDE 0.5 MILLIGRAM(S): 2 INJECTION INTRAMUSCULAR; INTRAVENOUS; SUBCUTANEOUS at 16:43

## 2023-01-01 RX ADMIN — MIRTAZAPINE 30 MILLIGRAM(S): 45 TABLET, ORALLY DISINTEGRATING ORAL at 21:37

## 2023-01-01 RX ADMIN — SACUBITRIL AND VALSARTAN 1 TABLET(S): 24; 26 TABLET, FILM COATED ORAL at 10:22

## 2023-01-01 RX ADMIN — SODIUM CHLORIDE 1000 MILLILITER(S): 9 INJECTION, SOLUTION INTRAVENOUS at 13:04

## 2023-01-01 RX ADMIN — Medication 4 MILLIGRAM(S): at 05:56

## 2023-01-01 RX ADMIN — I.V. FAT EMULSION 8.32 GM/KG/DAY: 20 EMULSION INTRAVENOUS at 21:34

## 2023-01-01 RX ADMIN — Medication 1 EACH: at 19:04

## 2023-01-01 RX ADMIN — SODIUM CHLORIDE 100 MILLILITER(S): 9 INJECTION, SOLUTION INTRAVENOUS at 10:34

## 2023-01-01 RX ADMIN — PANTOPRAZOLE SODIUM 40 MILLIGRAM(S): 20 TABLET, DELAYED RELEASE ORAL at 18:43

## 2023-01-01 RX ADMIN — Medication 5 MILLIGRAM(S): at 05:44

## 2023-01-01 RX ADMIN — Medication 5 MILLIGRAM(S): at 12:45

## 2023-01-01 RX ADMIN — Medication 5 MILLIGRAM(S): at 17:56

## 2023-01-01 RX ADMIN — Medication 4 MILLIGRAM(S): at 23:25

## 2023-01-01 RX ADMIN — Medication 1 PACKET(S): at 21:55

## 2023-01-01 RX ADMIN — Medication 4 MILLIGRAM(S): at 15:13

## 2023-01-01 RX ADMIN — PANTOPRAZOLE SODIUM 40 MILLIGRAM(S): 20 TABLET, DELAYED RELEASE ORAL at 05:36

## 2023-01-01 RX ADMIN — Medication 25 MILLIGRAM(S): at 06:22

## 2023-01-01 RX ADMIN — I.V. FAT EMULSION 8.33 GM/KG/DAY: 20 EMULSION INTRAVENOUS at 21:22

## 2023-01-01 RX ADMIN — Medication 1000 MILLIGRAM(S): at 04:52

## 2023-01-01 RX ADMIN — Medication 2 TABLET(S): at 16:13

## 2023-01-01 RX ADMIN — SODIUM CHLORIDE 10 MILLILITER(S): 9 INJECTION INTRAMUSCULAR; INTRAVENOUS; SUBCUTANEOUS at 13:27

## 2023-01-01 RX ADMIN — SODIUM CHLORIDE 100 MILLILITER(S): 9 INJECTION, SOLUTION INTRAVENOUS at 22:42

## 2023-01-01 RX ADMIN — Medication 62.5 MILLIMOLE(S): at 10:32

## 2023-01-01 RX ADMIN — Medication 500 MILLIGRAM(S): at 11:05

## 2023-01-01 RX ADMIN — CHLORHEXIDINE GLUCONATE 1 APPLICATION(S): 213 SOLUTION TOPICAL at 05:23

## 2023-01-01 RX ADMIN — HYDROMORPHONE HYDROCHLORIDE 0.25 MILLIGRAM(S): 2 INJECTION INTRAMUSCULAR; INTRAVENOUS; SUBCUTANEOUS at 01:15

## 2023-01-01 RX ADMIN — HYDROMORPHONE HYDROCHLORIDE 0.5 MILLIGRAM(S): 2 INJECTION INTRAMUSCULAR; INTRAVENOUS; SUBCUTANEOUS at 09:59

## 2023-01-01 RX ADMIN — PANTOPRAZOLE SODIUM 40 MILLIGRAM(S): 20 TABLET, DELAYED RELEASE ORAL at 09:11

## 2023-01-01 RX ADMIN — ONDANSETRON 4 MILLIGRAM(S): 8 TABLET, FILM COATED ORAL at 11:58

## 2023-01-01 RX ADMIN — Medication 5 MILLIGRAM(S): at 23:54

## 2023-01-01 RX ADMIN — Medication 4 MILLIGRAM(S): at 05:47

## 2023-01-01 RX ADMIN — ENOXAPARIN SODIUM 65 MILLIGRAM(S): 100 INJECTION SUBCUTANEOUS at 05:56

## 2023-01-01 RX ADMIN — PANTOPRAZOLE SODIUM 10 MG/HR: 20 TABLET, DELAYED RELEASE ORAL at 17:03

## 2023-01-01 RX ADMIN — ENOXAPARIN SODIUM 60 MILLIGRAM(S): 100 INJECTION SUBCUTANEOUS at 12:36

## 2023-01-01 RX ADMIN — Medication 1 PACKET(S): at 06:03

## 2023-01-01 RX ADMIN — Medication 5 MILLIGRAM(S): at 05:00

## 2023-01-01 RX ADMIN — Medication 2 TABLET(S): at 06:31

## 2023-01-01 RX ADMIN — OXYCODONE HYDROCHLORIDE 5 MILLIGRAM(S): 5 TABLET ORAL at 02:34

## 2023-01-01 RX ADMIN — Medication 5 MILLIGRAM(S): at 11:23

## 2023-01-01 RX ADMIN — HYDROMORPHONE HYDROCHLORIDE 0.5 MILLIGRAM(S): 2 INJECTION INTRAMUSCULAR; INTRAVENOUS; SUBCUTANEOUS at 12:20

## 2023-01-01 RX ADMIN — Medication 5 MILLIGRAM(S): at 18:49

## 2023-01-01 RX ADMIN — Medication 25 MILLIGRAM(S): at 05:52

## 2023-01-01 RX ADMIN — Medication 2 TABLET(S): at 05:39

## 2023-01-01 RX ADMIN — Medication 4 MILLIGRAM(S): at 21:18

## 2023-01-01 RX ADMIN — Medication 25 MILLIGRAM(S): at 06:58

## 2023-01-01 RX ADMIN — SACUBITRIL AND VALSARTAN 1 TABLET(S): 24; 26 TABLET, FILM COATED ORAL at 11:38

## 2023-01-01 RX ADMIN — Medication 4 MILLIGRAM(S): at 06:30

## 2023-01-01 RX ADMIN — MIRTAZAPINE 30 MILLIGRAM(S): 45 TABLET, ORALLY DISINTEGRATING ORAL at 23:07

## 2023-01-01 RX ADMIN — ENOXAPARIN SODIUM 60 MILLIGRAM(S): 100 INJECTION SUBCUTANEOUS at 12:45

## 2023-01-01 RX ADMIN — HYDROMORPHONE HYDROCHLORIDE 0.5 MILLIGRAM(S): 2 INJECTION INTRAMUSCULAR; INTRAVENOUS; SUBCUTANEOUS at 06:38

## 2023-01-01 RX ADMIN — AMIODARONE HYDROCHLORIDE 100 MILLIGRAM(S): 400 TABLET ORAL at 05:49

## 2023-01-01 RX ADMIN — NYSTATIN CREAM 1 APPLICATION(S): 100000 CREAM TOPICAL at 17:16

## 2023-01-01 RX ADMIN — Medication 4 MILLIGRAM(S): at 14:29

## 2023-01-01 RX ADMIN — Medication 650 MILLIGRAM(S): at 23:00

## 2023-01-01 RX ADMIN — Medication 4 MILLIGRAM(S): at 05:21

## 2023-01-01 RX ADMIN — Medication 4 MILLIGRAM(S): at 07:21

## 2023-01-01 RX ADMIN — ZINC SULFATE TAB 220 MG (50 MG ZINC EQUIVALENT) 220 MILLIGRAM(S): 220 (50 ZN) TAB at 11:17

## 2023-01-01 RX ADMIN — Medication 400 MILLIGRAM(S): at 22:02

## 2023-01-01 RX ADMIN — Medication 2.5 MILLIGRAM(S): at 05:31

## 2023-01-01 RX ADMIN — CHLORHEXIDINE GLUCONATE 1 APPLICATION(S): 213 SOLUTION TOPICAL at 05:38

## 2023-01-01 RX ADMIN — CHOLESTYRAMINE 2 GRAM(S): 4 POWDER, FOR SUSPENSION ORAL at 05:56

## 2023-01-01 RX ADMIN — SODIUM CHLORIDE 300 MILLILITER(S): 9 INJECTION, SOLUTION INTRAVENOUS at 12:58

## 2023-01-01 RX ADMIN — Medication 1 APPLICATION(S): at 13:09

## 2023-01-01 RX ADMIN — PANTOPRAZOLE SODIUM 40 MILLIGRAM(S): 20 TABLET, DELAYED RELEASE ORAL at 19:14

## 2023-01-01 RX ADMIN — Medication 400 MILLIGRAM(S): at 23:34

## 2023-01-01 RX ADMIN — Medication 1 EACH: at 18:56

## 2023-01-01 RX ADMIN — Medication 4 MILLIGRAM(S): at 05:57

## 2023-01-01 RX ADMIN — Medication 2 TABLET(S): at 06:58

## 2023-01-01 RX ADMIN — MORPHINE 6 MILLIGRAM(S): 10 SOLUTION ORAL at 18:43

## 2023-01-01 RX ADMIN — SODIUM CHLORIDE 10 MILLILITER(S): 9 INJECTION INTRAMUSCULAR; INTRAVENOUS; SUBCUTANEOUS at 22:54

## 2023-01-01 RX ADMIN — HYDROMORPHONE HYDROCHLORIDE 0.25 MILLIGRAM(S): 2 INJECTION INTRAMUSCULAR; INTRAVENOUS; SUBCUTANEOUS at 19:27

## 2023-01-01 RX ADMIN — CHLORHEXIDINE GLUCONATE 1 APPLICATION(S): 213 SOLUTION TOPICAL at 06:32

## 2023-01-01 RX ADMIN — Medication 4 MILLIGRAM(S): at 21:29

## 2023-01-01 RX ADMIN — HYDROMORPHONE HYDROCHLORIDE 0.25 MILLIGRAM(S): 2 INJECTION INTRAMUSCULAR; INTRAVENOUS; SUBCUTANEOUS at 21:44

## 2023-01-01 RX ADMIN — Medication 4 MILLIGRAM(S): at 14:19

## 2023-01-01 RX ADMIN — SACUBITRIL AND VALSARTAN 1 TABLET(S): 24; 26 TABLET, FILM COATED ORAL at 07:19

## 2023-01-01 RX ADMIN — SACUBITRIL AND VALSARTAN 1 TABLET(S): 24; 26 TABLET, FILM COATED ORAL at 22:08

## 2023-01-01 RX ADMIN — ERTAPENEM SODIUM 120 MILLIGRAM(S): 1 INJECTION, POWDER, LYOPHILIZED, FOR SOLUTION INTRAMUSCULAR; INTRAVENOUS at 09:49

## 2023-01-01 RX ADMIN — Medication 1 TABLET(S): at 12:51

## 2023-01-01 RX ADMIN — Medication 1 APPLICATION(S): at 11:09

## 2023-01-01 RX ADMIN — CHLORHEXIDINE GLUCONATE 1 APPLICATION(S): 213 SOLUTION TOPICAL at 05:59

## 2023-01-01 RX ADMIN — Medication 2 TABLET(S): at 22:31

## 2023-01-01 RX ADMIN — Medication 100 MILLIEQUIVALENT(S): at 18:17

## 2023-01-01 RX ADMIN — PANTOPRAZOLE SODIUM 40 MILLIGRAM(S): 20 TABLET, DELAYED RELEASE ORAL at 05:37

## 2023-01-01 RX ADMIN — HYDROMORPHONE HYDROCHLORIDE 0.25 MILLIGRAM(S): 2 INJECTION INTRAMUSCULAR; INTRAVENOUS; SUBCUTANEOUS at 02:59

## 2023-01-01 RX ADMIN — HYDROMORPHONE HYDROCHLORIDE 0.5 MILLIGRAM(S): 2 INJECTION INTRAMUSCULAR; INTRAVENOUS; SUBCUTANEOUS at 15:15

## 2023-01-01 RX ADMIN — I.V. FAT EMULSION 20.8 GM/KG/DAY: 20 EMULSION INTRAVENOUS at 19:44

## 2023-01-01 RX ADMIN — SODIUM CHLORIDE 10 MILLILITER(S): 9 INJECTION INTRAMUSCULAR; INTRAVENOUS; SUBCUTANEOUS at 06:02

## 2023-01-01 RX ADMIN — ENOXAPARIN SODIUM 60 MILLIGRAM(S): 100 INJECTION SUBCUTANEOUS at 23:33

## 2023-01-01 RX ADMIN — CHLORHEXIDINE GLUCONATE 1 APPLICATION(S): 213 SOLUTION TOPICAL at 17:53

## 2023-01-01 RX ADMIN — Medication 650 MILLIGRAM(S): at 15:20

## 2023-01-01 RX ADMIN — PIPERACILLIN AND TAZOBACTAM 25 GRAM(S): 4; .5 INJECTION, POWDER, LYOPHILIZED, FOR SOLUTION INTRAVENOUS at 07:38

## 2023-01-01 RX ADMIN — SODIUM CHLORIDE 10 MILLILITER(S): 9 INJECTION INTRAMUSCULAR; INTRAVENOUS; SUBCUTANEOUS at 22:39

## 2023-01-01 RX ADMIN — SODIUM CHLORIDE 1000 MILLILITER(S): 9 INJECTION, SOLUTION INTRAVENOUS at 12:30

## 2023-01-01 RX ADMIN — SODIUM CHLORIDE 1000 MILLILITER(S): 9 INJECTION, SOLUTION INTRAVENOUS at 07:00

## 2023-01-01 RX ADMIN — Medication 1 PACKET(S): at 14:01

## 2023-01-01 RX ADMIN — Medication 1 APPLICATION(S): at 15:48

## 2023-01-01 RX ADMIN — Medication 650 MILLIGRAM(S): at 01:34

## 2023-01-01 RX ADMIN — SACUBITRIL AND VALSARTAN 1 TABLET(S): 24; 26 TABLET, FILM COATED ORAL at 21:17

## 2023-01-01 RX ADMIN — Medication 400 MILLIGRAM(S): at 13:57

## 2023-01-01 RX ADMIN — Medication 4 MILLIGRAM(S): at 18:09

## 2023-01-01 RX ADMIN — CHLORHEXIDINE GLUCONATE 1 APPLICATION(S): 213 SOLUTION TOPICAL at 06:17

## 2023-01-01 RX ADMIN — AMIODARONE HYDROCHLORIDE 100 MILLIGRAM(S): 400 TABLET ORAL at 05:36

## 2023-01-01 RX ADMIN — AMIODARONE HYDROCHLORIDE 200 MILLIGRAM(S): 400 TABLET ORAL at 06:29

## 2023-01-01 RX ADMIN — AMIODARONE HYDROCHLORIDE 16.7 MG/MIN: 400 TABLET ORAL at 16:16

## 2023-01-01 RX ADMIN — ERTAPENEM SODIUM 120 MILLIGRAM(S): 1 INJECTION, POWDER, LYOPHILIZED, FOR SOLUTION INTRAMUSCULAR; INTRAVENOUS at 14:00

## 2023-01-01 RX ADMIN — SACUBITRIL AND VALSARTAN 1 TABLET(S): 24; 26 TABLET, FILM COATED ORAL at 18:27

## 2023-01-01 RX ADMIN — ENOXAPARIN SODIUM 60 MILLIGRAM(S): 100 INJECTION SUBCUTANEOUS at 06:35

## 2023-01-01 RX ADMIN — SPIRONOLACTONE 25 MILLIGRAM(S): 25 TABLET, FILM COATED ORAL at 05:21

## 2023-01-01 RX ADMIN — Medication 1000 MILLIGRAM(S): at 12:02

## 2023-01-01 RX ADMIN — SODIUM CHLORIDE 650 MILLILITER(S): 9 INJECTION, SOLUTION INTRAVENOUS at 13:39

## 2023-01-01 RX ADMIN — MORPHINE 6 MILLIGRAM(S): 10 SOLUTION ORAL at 18:18

## 2023-01-01 RX ADMIN — PIPERACILLIN AND TAZOBACTAM 25 GRAM(S): 4; .5 INJECTION, POWDER, LYOPHILIZED, FOR SOLUTION INTRAVENOUS at 07:23

## 2023-01-01 RX ADMIN — MIRTAZAPINE 30 MILLIGRAM(S): 45 TABLET, ORALLY DISINTEGRATING ORAL at 23:00

## 2023-01-01 RX ADMIN — MORPHINE 6 MILLIGRAM(S): 10 SOLUTION ORAL at 18:05

## 2023-01-01 RX ADMIN — SODIUM CHLORIDE 575 MILLILITER(S): 9 INJECTION, SOLUTION INTRAVENOUS at 23:13

## 2023-01-01 RX ADMIN — Medication 1000 MILLIGRAM(S): at 10:35

## 2023-01-01 RX ADMIN — HYDROMORPHONE HYDROCHLORIDE 0.25 MILLIGRAM(S): 2 INJECTION INTRAMUSCULAR; INTRAVENOUS; SUBCUTANEOUS at 23:36

## 2023-01-01 RX ADMIN — Medication 500 MILLIGRAM(S): at 11:30

## 2023-01-01 RX ADMIN — Medication 1 TABLET(S): at 12:24

## 2023-01-01 RX ADMIN — Medication 650 MILLIGRAM(S): at 18:08

## 2023-01-01 RX ADMIN — Medication 400 MILLIGRAM(S): at 22:58

## 2023-01-01 RX ADMIN — Medication 1 PACKET(S): at 16:02

## 2023-01-01 RX ADMIN — AMIODARONE HYDROCHLORIDE 100 MILLIGRAM(S): 400 TABLET ORAL at 06:21

## 2023-01-01 RX ADMIN — Medication 2 TABLET(S): at 06:39

## 2023-01-01 RX ADMIN — SACUBITRIL AND VALSARTAN 1 TABLET(S): 24; 26 TABLET, FILM COATED ORAL at 05:37

## 2023-01-01 RX ADMIN — SACUBITRIL AND VALSARTAN 1 TABLET(S): 24; 26 TABLET, FILM COATED ORAL at 18:22

## 2023-01-01 RX ADMIN — ENOXAPARIN SODIUM 65 MILLIGRAM(S): 100 INJECTION SUBCUTANEOUS at 05:57

## 2023-01-01 RX ADMIN — HYDROMORPHONE HYDROCHLORIDE 0.25 MILLIGRAM(S): 2 INJECTION INTRAMUSCULAR; INTRAVENOUS; SUBCUTANEOUS at 01:53

## 2023-01-01 RX ADMIN — Medication 1 APPLICATION(S): at 13:43

## 2023-01-01 RX ADMIN — Medication 400 MILLIGRAM(S): at 10:55

## 2023-01-01 RX ADMIN — MORPHINE 6 MILLIGRAM(S): 10 SOLUTION ORAL at 00:34

## 2023-01-01 RX ADMIN — Medication 500 MILLIGRAM(S): at 13:19

## 2023-01-01 RX ADMIN — Medication 1 MILLIGRAM(S): at 20:37

## 2023-01-01 RX ADMIN — Medication 1 EACH: at 19:12

## 2023-01-01 RX ADMIN — LIDOCAINE 1 PATCH: 4 CREAM TOPICAL at 12:20

## 2023-01-01 RX ADMIN — ENOXAPARIN SODIUM 60 MILLIGRAM(S): 100 INJECTION SUBCUTANEOUS at 22:42

## 2023-01-01 RX ADMIN — PANTOPRAZOLE SODIUM 40 MILLIGRAM(S): 20 TABLET, DELAYED RELEASE ORAL at 05:51

## 2023-01-01 RX ADMIN — CHLORHEXIDINE GLUCONATE 1 APPLICATION(S): 213 SOLUTION TOPICAL at 06:09

## 2023-01-01 RX ADMIN — PANTOPRAZOLE SODIUM 40 MILLIGRAM(S): 20 TABLET, DELAYED RELEASE ORAL at 22:32

## 2023-01-01 RX ADMIN — HYDROMORPHONE HYDROCHLORIDE 0.5 MILLIGRAM(S): 2 INJECTION INTRAMUSCULAR; INTRAVENOUS; SUBCUTANEOUS at 18:26

## 2023-01-01 RX ADMIN — NYSTATIN CREAM 1 APPLICATION(S): 100000 CREAM TOPICAL at 17:50

## 2023-01-01 RX ADMIN — Medication 25 MILLIGRAM(S): at 04:19

## 2023-01-01 RX ADMIN — SACUBITRIL AND VALSARTAN 1 TABLET(S): 24; 26 TABLET, FILM COATED ORAL at 17:43

## 2023-01-01 RX ADMIN — CHLORHEXIDINE GLUCONATE 1 APPLICATION(S): 213 SOLUTION TOPICAL at 07:22

## 2023-01-01 RX ADMIN — Medication 5 MILLIGRAM(S): at 00:06

## 2023-01-01 RX ADMIN — AMIODARONE HYDROCHLORIDE 100 MILLIGRAM(S): 400 TABLET ORAL at 06:05

## 2023-01-01 RX ADMIN — AMIODARONE HYDROCHLORIDE 16.7 MG/MIN: 400 TABLET ORAL at 02:11

## 2023-01-01 RX ADMIN — HYDROMORPHONE HYDROCHLORIDE 0.25 MILLIGRAM(S): 2 INJECTION INTRAMUSCULAR; INTRAVENOUS; SUBCUTANEOUS at 15:55

## 2023-01-01 RX ADMIN — I.V. FAT EMULSION 20.83 GM/KG/DAY: 20 EMULSION INTRAVENOUS at 18:13

## 2023-01-01 RX ADMIN — Medication 1 APPLICATION(S): at 11:39

## 2023-01-01 RX ADMIN — MIRTAZAPINE 30 MILLIGRAM(S): 45 TABLET, ORALLY DISINTEGRATING ORAL at 23:35

## 2023-01-01 RX ADMIN — I.V. FAT EMULSION 8.33 GM/KG/DAY: 20 EMULSION INTRAVENOUS at 22:29

## 2023-01-01 RX ADMIN — OXYCODONE HYDROCHLORIDE 5 MILLIGRAM(S): 5 TABLET ORAL at 23:58

## 2023-01-01 RX ADMIN — Medication 5 MILLIGRAM(S): at 23:01

## 2023-01-01 RX ADMIN — Medication 650 MILLIGRAM(S): at 09:03

## 2023-01-01 RX ADMIN — Medication 650 MILLIGRAM(S): at 16:42

## 2023-01-01 RX ADMIN — SODIUM CHLORIDE 400 MILLILITER(S): 9 INJECTION, SOLUTION INTRAVENOUS at 06:16

## 2023-01-01 RX ADMIN — CHLORHEXIDINE GLUCONATE 1 APPLICATION(S): 213 SOLUTION TOPICAL at 06:48

## 2023-01-01 RX ADMIN — SODIUM CHLORIDE 400 MILLILITER(S): 9 INJECTION, SOLUTION INTRAVENOUS at 09:00

## 2023-01-01 RX ADMIN — MORPHINE 6 MILLIGRAM(S): 10 SOLUTION ORAL at 10:42

## 2023-01-01 RX ADMIN — PANTOPRAZOLE SODIUM 40 MILLIGRAM(S): 20 TABLET, DELAYED RELEASE ORAL at 06:26

## 2023-01-01 RX ADMIN — Medication 4 MILLIGRAM(S): at 23:09

## 2023-01-01 RX ADMIN — Medication 650 MILLIGRAM(S): at 22:48

## 2023-01-01 RX ADMIN — HYDROMORPHONE HYDROCHLORIDE 0.25 MILLIGRAM(S): 2 INJECTION INTRAMUSCULAR; INTRAVENOUS; SUBCUTANEOUS at 12:28

## 2023-01-01 RX ADMIN — Medication 5 MILLIGRAM(S): at 00:21

## 2023-01-01 RX ADMIN — HYDROMORPHONE HYDROCHLORIDE 0.5 MILLIGRAM(S): 2 INJECTION INTRAMUSCULAR; INTRAVENOUS; SUBCUTANEOUS at 10:13

## 2023-01-01 RX ADMIN — PANTOPRAZOLE SODIUM 40 MILLIGRAM(S): 20 TABLET, DELAYED RELEASE ORAL at 17:37

## 2023-01-01 RX ADMIN — Medication 1 PACKET(S): at 14:19

## 2023-01-01 RX ADMIN — ERTAPENEM SODIUM 120 MILLIGRAM(S): 1 INJECTION, POWDER, LYOPHILIZED, FOR SOLUTION INTRAMUSCULAR; INTRAVENOUS at 12:15

## 2023-01-01 RX ADMIN — Medication 1 TABLET(S): at 13:23

## 2023-01-01 RX ADMIN — Medication 4 MILLIGRAM(S): at 15:49

## 2023-01-01 RX ADMIN — CHLORHEXIDINE GLUCONATE 1 APPLICATION(S): 213 SOLUTION TOPICAL at 08:11

## 2023-01-01 RX ADMIN — Medication 2 TABLET(S): at 22:42

## 2023-01-01 RX ADMIN — AMIODARONE HYDROCHLORIDE 16.7 MG/MIN: 400 TABLET ORAL at 10:04

## 2023-01-01 RX ADMIN — PIPERACILLIN AND TAZOBACTAM 25 GRAM(S): 4; .5 INJECTION, POWDER, LYOPHILIZED, FOR SOLUTION INTRAVENOUS at 16:26

## 2023-01-01 RX ADMIN — Medication 5 MILLIGRAM(S): at 06:41

## 2023-01-01 RX ADMIN — OXYCODONE HYDROCHLORIDE 5 MILLIGRAM(S): 5 TABLET ORAL at 21:23

## 2023-01-01 RX ADMIN — Medication 1 APPLICATION(S): at 12:15

## 2023-01-01 RX ADMIN — SODIUM CHLORIDE 1000 MILLILITER(S): 9 INJECTION, SOLUTION INTRAVENOUS at 19:22

## 2023-01-01 RX ADMIN — Medication 4 MILLIGRAM(S): at 05:27

## 2023-01-01 RX ADMIN — Medication 5 MILLIGRAM(S): at 12:15

## 2023-01-01 RX ADMIN — Medication 400 MILLIGRAM(S): at 13:20

## 2023-01-01 RX ADMIN — SODIUM CHLORIDE 1000 MILLILITER(S): 9 INJECTION, SOLUTION INTRAVENOUS at 14:40

## 2023-01-01 RX ADMIN — ENOXAPARIN SODIUM 60 MILLIGRAM(S): 100 INJECTION SUBCUTANEOUS at 22:39

## 2023-01-01 RX ADMIN — HYDROMORPHONE HYDROCHLORIDE 0.5 MILLIGRAM(S): 2 INJECTION INTRAMUSCULAR; INTRAVENOUS; SUBCUTANEOUS at 08:10

## 2023-01-01 RX ADMIN — SACUBITRIL AND VALSARTAN 1 TABLET(S): 24; 26 TABLET, FILM COATED ORAL at 17:53

## 2023-01-01 RX ADMIN — MIRTAZAPINE 15 MILLIGRAM(S): 45 TABLET, ORALLY DISINTEGRATING ORAL at 21:42

## 2023-01-01 RX ADMIN — Medication 4 MILLIGRAM(S): at 06:28

## 2023-01-01 RX ADMIN — CASPOFUNGIN ACETATE 260 MILLIGRAM(S): 7 INJECTION, POWDER, LYOPHILIZED, FOR SOLUTION INTRAVENOUS at 21:07

## 2023-01-01 RX ADMIN — Medication 25 GRAM(S): at 05:48

## 2023-01-01 RX ADMIN — AMIODARONE HYDROCHLORIDE 100 MILLIGRAM(S): 400 TABLET ORAL at 08:08

## 2023-01-01 RX ADMIN — FLUCONAZOLE 100 MILLIGRAM(S): 150 TABLET ORAL at 17:01

## 2023-01-01 RX ADMIN — SODIUM CHLORIDE 500 MILLILITER(S): 9 INJECTION, SOLUTION INTRAVENOUS at 22:31

## 2023-01-01 RX ADMIN — Medication 2 TABLET(S): at 14:26

## 2023-01-01 RX ADMIN — LIDOCAINE 1 PATCH: 4 CREAM TOPICAL at 23:10

## 2023-01-01 RX ADMIN — Medication 5 MILLIGRAM(S): at 17:06

## 2023-01-01 RX ADMIN — PANTOPRAZOLE SODIUM 40 MILLIGRAM(S): 20 TABLET, DELAYED RELEASE ORAL at 05:21

## 2023-01-01 RX ADMIN — AMIODARONE HYDROCHLORIDE 400 MILLIGRAM(S): 400 TABLET ORAL at 22:08

## 2023-01-01 RX ADMIN — MORPHINE 6 MILLIGRAM(S): 10 SOLUTION ORAL at 18:27

## 2023-01-01 RX ADMIN — Medication 650 MILLIGRAM(S): at 00:58

## 2023-01-01 RX ADMIN — DAPTOMYCIN 120 MILLIGRAM(S): 500 INJECTION, POWDER, LYOPHILIZED, FOR SOLUTION INTRAVENOUS at 10:14

## 2023-01-01 RX ADMIN — SODIUM CHLORIDE 10 MILLILITER(S): 9 INJECTION INTRAMUSCULAR; INTRAVENOUS; SUBCUTANEOUS at 13:00

## 2023-01-01 RX ADMIN — Medication 1000 MILLIGRAM(S): at 14:00

## 2023-01-01 RX ADMIN — SODIUM CHLORIDE 500 MILLILITER(S): 9 INJECTION, SOLUTION INTRAVENOUS at 23:55

## 2023-01-01 RX ADMIN — DAPTOMYCIN 120 MILLIGRAM(S): 500 INJECTION, POWDER, LYOPHILIZED, FOR SOLUTION INTRAVENOUS at 12:25

## 2023-01-01 RX ADMIN — Medication 1000 MILLIGRAM(S): at 18:00

## 2023-01-01 RX ADMIN — Medication 2 TABLET(S): at 14:42

## 2023-01-01 RX ADMIN — MORPHINE 6 MILLIGRAM(S): 10 SOLUTION ORAL at 10:27

## 2023-01-01 RX ADMIN — OXYCODONE HYDROCHLORIDE 5 MILLIGRAM(S): 5 TABLET ORAL at 15:35

## 2023-01-01 RX ADMIN — HYDROMORPHONE HYDROCHLORIDE 0.5 MILLIGRAM(S): 2 INJECTION INTRAMUSCULAR; INTRAVENOUS; SUBCUTANEOUS at 03:33

## 2023-01-01 RX ADMIN — Medication 4 MILLIGRAM(S): at 22:42

## 2023-01-01 RX ADMIN — Medication 5 MILLIGRAM(S): at 05:47

## 2023-01-01 RX ADMIN — I.V. FAT EMULSION 20.83 GM/KG/DAY: 20 EMULSION INTRAVENOUS at 18:08

## 2023-01-01 RX ADMIN — Medication 1 TABLET(S): at 12:54

## 2023-01-01 RX ADMIN — Medication 1 APPLICATION(S): at 12:14

## 2023-01-01 RX ADMIN — OXYCODONE HYDROCHLORIDE 5 MILLIGRAM(S): 5 TABLET ORAL at 02:37

## 2023-01-01 RX ADMIN — SODIUM CHLORIDE 10 MILLILITER(S): 9 INJECTION INTRAMUSCULAR; INTRAVENOUS; SUBCUTANEOUS at 13:41

## 2023-01-01 RX ADMIN — PIPERACILLIN AND TAZOBACTAM 25 GRAM(S): 4; .5 INJECTION, POWDER, LYOPHILIZED, FOR SOLUTION INTRAVENOUS at 01:39

## 2023-01-01 RX ADMIN — MORPHINE 6 MILLIGRAM(S): 10 SOLUTION ORAL at 12:31

## 2023-01-01 RX ADMIN — Medication 2 TABLET(S): at 21:17

## 2023-01-01 RX ADMIN — HYDROMORPHONE HYDROCHLORIDE 0.5 MILLIGRAM(S): 2 INJECTION INTRAMUSCULAR; INTRAVENOUS; SUBCUTANEOUS at 22:17

## 2023-01-01 RX ADMIN — ENOXAPARIN SODIUM 65 MILLIGRAM(S): 100 INJECTION SUBCUTANEOUS at 18:52

## 2023-01-01 RX ADMIN — Medication 500 MILLIGRAM(S): at 12:16

## 2023-01-01 RX ADMIN — FLUCONAZOLE 100 MILLIGRAM(S): 150 TABLET ORAL at 17:54

## 2023-01-01 RX ADMIN — MIRTAZAPINE 30 MILLIGRAM(S): 45 TABLET, ORALLY DISINTEGRATING ORAL at 21:52

## 2023-01-01 RX ADMIN — Medication 4 MILLIGRAM(S): at 21:28

## 2023-01-01 RX ADMIN — Medication 5 MILLIGRAM(S): at 11:05

## 2023-01-01 RX ADMIN — Medication 25 MILLIGRAM(S): at 07:21

## 2023-01-01 RX ADMIN — HYDROMORPHONE HYDROCHLORIDE 0.25 MILLIGRAM(S): 2 INJECTION INTRAMUSCULAR; INTRAVENOUS; SUBCUTANEOUS at 07:49

## 2023-01-01 RX ADMIN — DAPTOMYCIN 120 MILLIGRAM(S): 500 INJECTION, POWDER, LYOPHILIZED, FOR SOLUTION INTRAVENOUS at 04:34

## 2023-01-01 RX ADMIN — MORPHINE 6 MILLIGRAM(S): 10 SOLUTION ORAL at 17:59

## 2023-01-01 RX ADMIN — SODIUM CHLORIDE 500 MILLILITER(S): 9 INJECTION, SOLUTION INTRAVENOUS at 18:44

## 2023-01-01 RX ADMIN — SODIUM CHLORIDE 1000 MILLILITER(S): 9 INJECTION, SOLUTION INTRAVENOUS at 09:47

## 2023-01-01 RX ADMIN — Medication 1000 MILLIGRAM(S): at 12:07

## 2023-01-01 RX ADMIN — I.V. FAT EMULSION 20.8 GM/KG/DAY: 20 EMULSION INTRAVENOUS at 22:18

## 2023-01-01 RX ADMIN — Medication 1 PACKET(S): at 18:27

## 2023-01-01 RX ADMIN — SODIUM CHLORIDE 500 MILLILITER(S): 9 INJECTION INTRAMUSCULAR; INTRAVENOUS; SUBCUTANEOUS at 22:00

## 2023-01-01 RX ADMIN — MORPHINE 6 MILLIGRAM(S): 10 SOLUTION ORAL at 19:18

## 2023-01-01 RX ADMIN — Medication 1 TABLET(S): at 13:01

## 2023-01-01 RX ADMIN — Medication 1 TABLET(S): at 11:38

## 2023-01-01 RX ADMIN — ENOXAPARIN SODIUM 60 MILLIGRAM(S): 100 INJECTION SUBCUTANEOUS at 18:07

## 2023-01-01 RX ADMIN — Medication 1 EACH: at 19:01

## 2023-01-01 RX ADMIN — MIRTAZAPINE 30 MILLIGRAM(S): 45 TABLET, ORALLY DISINTEGRATING ORAL at 21:38

## 2023-01-01 RX ADMIN — SODIUM CHLORIDE 10 MILLILITER(S): 9 INJECTION INTRAMUSCULAR; INTRAVENOUS; SUBCUTANEOUS at 15:40

## 2023-01-01 RX ADMIN — Medication 4 MILLIGRAM(S): at 22:37

## 2023-01-01 RX ADMIN — SODIUM CHLORIDE 400 MILLILITER(S): 9 INJECTION, SOLUTION INTRAVENOUS at 05:49

## 2023-01-01 RX ADMIN — PANTOPRAZOLE SODIUM 40 MILLIGRAM(S): 20 TABLET, DELAYED RELEASE ORAL at 17:05

## 2023-01-01 RX ADMIN — PIPERACILLIN AND TAZOBACTAM 25 GRAM(S): 4; .5 INJECTION, POWDER, LYOPHILIZED, FOR SOLUTION INTRAVENOUS at 23:33

## 2023-01-01 RX ADMIN — Medication 4 MILLIGRAM(S): at 07:19

## 2023-01-01 RX ADMIN — Medication 1000 MILLIGRAM(S): at 15:25

## 2023-01-01 RX ADMIN — Medication 5 MILLIGRAM(S): at 00:15

## 2023-01-01 RX ADMIN — Medication 1 EACH: at 17:48

## 2023-01-01 RX ADMIN — DIATRIZOATE MEGLUMINE 30 MILLILITER(S): 180 INJECTION, SOLUTION INTRAVESICAL at 14:53

## 2023-01-01 RX ADMIN — PANTOPRAZOLE SODIUM 40 MILLIGRAM(S): 20 TABLET, DELAYED RELEASE ORAL at 21:33

## 2023-01-01 RX ADMIN — HYDROMORPHONE HYDROCHLORIDE 0.25 MILLIGRAM(S): 2 INJECTION INTRAMUSCULAR; INTRAVENOUS; SUBCUTANEOUS at 05:45

## 2023-01-01 RX ADMIN — ZINC SULFATE TAB 220 MG (50 MG ZINC EQUIVALENT) 220 MILLIGRAM(S): 220 (50 ZN) TAB at 12:29

## 2023-01-01 RX ADMIN — PANTOPRAZOLE SODIUM 40 MILLIGRAM(S): 20 TABLET, DELAYED RELEASE ORAL at 07:21

## 2023-01-01 RX ADMIN — SODIUM CHLORIDE 2000 MILLILITER(S): 9 INJECTION, SOLUTION INTRAVENOUS at 01:00

## 2023-01-01 RX ADMIN — ZINC SULFATE TAB 220 MG (50 MG ZINC EQUIVALENT) 220 MILLIGRAM(S): 220 (50 ZN) TAB at 13:09

## 2023-01-01 RX ADMIN — Medication 4 MILLIGRAM(S): at 05:58

## 2023-01-01 RX ADMIN — Medication 1000 MILLIGRAM(S): at 06:32

## 2023-01-01 RX ADMIN — I.V. FAT EMULSION 20.83 GM/KG/DAY: 20 EMULSION INTRAVENOUS at 18:01

## 2023-01-01 RX ADMIN — HYDROMORPHONE HYDROCHLORIDE 0.5 MILLIGRAM(S): 2 INJECTION INTRAMUSCULAR; INTRAVENOUS; SUBCUTANEOUS at 00:47

## 2023-01-01 RX ADMIN — Medication 4 MILLIGRAM(S): at 06:35

## 2023-01-01 RX ADMIN — AMIODARONE HYDROCHLORIDE 100 MILLIGRAM(S): 400 TABLET ORAL at 05:53

## 2023-01-01 RX ADMIN — SODIUM CHLORIDE 10 MILLILITER(S): 9 INJECTION INTRAMUSCULAR; INTRAVENOUS; SUBCUTANEOUS at 06:00

## 2023-01-01 RX ADMIN — ZINC SULFATE TAB 220 MG (50 MG ZINC EQUIVALENT) 220 MILLIGRAM(S): 220 (50 ZN) TAB at 12:25

## 2023-01-01 RX ADMIN — Medication 500 MILLIGRAM(S): at 12:26

## 2023-01-01 RX ADMIN — ENOXAPARIN SODIUM 60 MILLIGRAM(S): 100 INJECTION SUBCUTANEOUS at 06:45

## 2023-01-01 RX ADMIN — Medication 4 MILLIGRAM(S): at 05:37

## 2023-01-01 RX ADMIN — Medication 2 TABLET(S): at 14:20

## 2023-01-01 RX ADMIN — Medication 4 MILLIGRAM(S): at 22:16

## 2023-01-01 RX ADMIN — Medication 25 GRAM(S): at 13:51

## 2023-01-01 RX ADMIN — CHLORHEXIDINE GLUCONATE 1 APPLICATION(S): 213 SOLUTION TOPICAL at 06:23

## 2023-01-01 RX ADMIN — Medication 4 MILLIGRAM(S): at 05:22

## 2023-01-01 RX ADMIN — OXYCODONE HYDROCHLORIDE 5 MILLIGRAM(S): 5 TABLET ORAL at 12:30

## 2023-01-01 RX ADMIN — Medication 125 MILLILITER(S): at 13:00

## 2023-01-01 RX ADMIN — Medication 2 TABLET(S): at 15:49

## 2023-01-01 RX ADMIN — PANTOPRAZOLE SODIUM 40 MILLIGRAM(S): 20 TABLET, DELAYED RELEASE ORAL at 21:39

## 2023-01-01 RX ADMIN — MIRTAZAPINE 30 MILLIGRAM(S): 45 TABLET, ORALLY DISINTEGRATING ORAL at 22:06

## 2023-01-01 RX ADMIN — HEPARIN SODIUM 15 UNIT(S)/HR: 5000 INJECTION INTRAVENOUS; SUBCUTANEOUS at 23:22

## 2023-01-01 RX ADMIN — SODIUM CHLORIDE 1000 MILLILITER(S): 9 INJECTION, SOLUTION INTRAVENOUS at 00:02

## 2023-01-01 RX ADMIN — Medication 4 MILLIGRAM(S): at 00:07

## 2023-01-01 RX ADMIN — Medication 1 APPLICATION(S): at 14:01

## 2023-01-01 RX ADMIN — LIDOCAINE 1 PATCH: 4 CREAM TOPICAL at 11:55

## 2023-01-01 RX ADMIN — PANTOPRAZOLE SODIUM 40 MILLIGRAM(S): 20 TABLET, DELAYED RELEASE ORAL at 22:23

## 2023-01-01 RX ADMIN — OXYCODONE HYDROCHLORIDE 10 MILLIGRAM(S): 5 TABLET ORAL at 01:00

## 2023-01-01 RX ADMIN — Medication 4 MILLIGRAM(S): at 22:08

## 2023-01-01 RX ADMIN — Medication 2 TABLET(S): at 15:31

## 2023-01-01 RX ADMIN — HYDROMORPHONE HYDROCHLORIDE 0.5 MILLIGRAM(S): 2 INJECTION INTRAMUSCULAR; INTRAVENOUS; SUBCUTANEOUS at 15:23

## 2023-01-01 RX ADMIN — Medication 5 MILLIGRAM(S): at 17:04

## 2023-01-01 RX ADMIN — CHLORHEXIDINE GLUCONATE 1 APPLICATION(S): 213 SOLUTION TOPICAL at 12:17

## 2023-01-01 RX ADMIN — SODIUM CHLORIDE 100 MILLILITER(S): 9 INJECTION, SOLUTION INTRAVENOUS at 00:09

## 2023-01-01 RX ADMIN — SACUBITRIL AND VALSARTAN 1 TABLET(S): 24; 26 TABLET, FILM COATED ORAL at 09:31

## 2023-01-01 RX ADMIN — SACUBITRIL AND VALSARTAN 1 TABLET(S): 24; 26 TABLET, FILM COATED ORAL at 05:58

## 2023-01-01 RX ADMIN — ENOXAPARIN SODIUM 60 MILLIGRAM(S): 100 INJECTION SUBCUTANEOUS at 12:21

## 2023-01-01 RX ADMIN — SODIUM CHLORIDE 750 MILLILITER(S): 9 INJECTION, SOLUTION INTRAVENOUS at 02:28

## 2023-01-01 RX ADMIN — SODIUM CHLORIDE 10 MILLILITER(S): 9 INJECTION INTRAMUSCULAR; INTRAVENOUS; SUBCUTANEOUS at 23:28

## 2023-01-01 RX ADMIN — ENOXAPARIN SODIUM 65 MILLIGRAM(S): 100 INJECTION SUBCUTANEOUS at 05:52

## 2023-01-01 RX ADMIN — ERTAPENEM SODIUM 120 MILLIGRAM(S): 1 INJECTION, POWDER, LYOPHILIZED, FOR SOLUTION INTRAMUSCULAR; INTRAVENOUS at 11:56

## 2023-01-01 RX ADMIN — SPIRONOLACTONE 25 MILLIGRAM(S): 25 TABLET, FILM COATED ORAL at 06:28

## 2023-01-01 RX ADMIN — Medication 1000 MILLIGRAM(S): at 18:27

## 2023-01-01 RX ADMIN — Medication 40 MILLIEQUIVALENT(S): at 14:48

## 2023-01-01 RX ADMIN — OXYCODONE HYDROCHLORIDE 5 MILLIGRAM(S): 5 TABLET ORAL at 23:45

## 2023-01-01 RX ADMIN — DAPTOMYCIN 120 MILLIGRAM(S): 500 INJECTION, POWDER, LYOPHILIZED, FOR SOLUTION INTRAVENOUS at 11:01

## 2023-01-01 RX ADMIN — Medication 1 PACKET(S): at 17:09

## 2023-01-01 RX ADMIN — Medication 4 MILLIGRAM(S): at 23:02

## 2023-01-01 RX ADMIN — Medication 4 MILLIGRAM(S): at 22:29

## 2023-01-01 RX ADMIN — SODIUM CHLORIDE 400 MILLILITER(S): 9 INJECTION, SOLUTION INTRAVENOUS at 20:04

## 2023-01-01 RX ADMIN — CHOLESTYRAMINE 2 GRAM(S): 4 POWDER, FOR SUSPENSION ORAL at 21:35

## 2023-01-01 RX ADMIN — MORPHINE 6 MILLIGRAM(S): 10 SOLUTION ORAL at 07:17

## 2023-01-01 RX ADMIN — SPIRONOLACTONE 25 MILLIGRAM(S): 25 TABLET, FILM COATED ORAL at 06:17

## 2023-01-01 RX ADMIN — HYDROMORPHONE HYDROCHLORIDE 0.5 MILLIGRAM(S): 2 INJECTION INTRAMUSCULAR; INTRAVENOUS; SUBCUTANEOUS at 16:06

## 2023-01-01 RX ADMIN — Medication 1 MILLIGRAM(S): at 22:11

## 2023-01-01 RX ADMIN — SODIUM CHLORIDE 10 MILLILITER(S): 9 INJECTION INTRAMUSCULAR; INTRAVENOUS; SUBCUTANEOUS at 21:29

## 2023-01-01 RX ADMIN — Medication 100 GRAM(S): at 15:45

## 2023-01-01 RX ADMIN — PIPERACILLIN AND TAZOBACTAM 25 GRAM(S): 4; .5 INJECTION, POWDER, LYOPHILIZED, FOR SOLUTION INTRAVENOUS at 16:19

## 2023-01-01 RX ADMIN — OXYCODONE HYDROCHLORIDE 5 MILLIGRAM(S): 5 TABLET ORAL at 23:54

## 2023-01-01 RX ADMIN — NYSTATIN CREAM 1 APPLICATION(S): 100000 CREAM TOPICAL at 17:40

## 2023-01-01 RX ADMIN — Medication 2 TABLET(S): at 14:41

## 2023-01-01 RX ADMIN — Medication 2 TABLET(S): at 05:57

## 2023-01-01 RX ADMIN — PANTOPRAZOLE SODIUM 40 MILLIGRAM(S): 20 TABLET, DELAYED RELEASE ORAL at 05:52

## 2023-01-01 RX ADMIN — Medication 650 MILLIGRAM(S): at 22:16

## 2023-01-01 RX ADMIN — Medication 25 MILLIGRAM(S): at 06:35

## 2023-01-01 RX ADMIN — SODIUM CHLORIDE 500 MILLILITER(S): 9 INJECTION, SOLUTION INTRAVENOUS at 18:29

## 2023-01-01 RX ADMIN — Medication 4 MILLIGRAM(S): at 05:49

## 2023-01-01 RX ADMIN — Medication 650 MILLIGRAM(S): at 05:00

## 2023-01-01 RX ADMIN — ENOXAPARIN SODIUM 60 MILLIGRAM(S): 100 INJECTION SUBCUTANEOUS at 10:55

## 2023-01-01 RX ADMIN — SODIUM CHLORIDE 1000 MILLILITER(S): 9 INJECTION, SOLUTION INTRAVENOUS at 19:05

## 2023-01-01 RX ADMIN — SODIUM CHLORIDE 1000 MILLILITER(S): 9 INJECTION, SOLUTION INTRAVENOUS at 22:31

## 2023-01-01 RX ADMIN — HYDROMORPHONE HYDROCHLORIDE 0.25 MILLIGRAM(S): 2 INJECTION INTRAMUSCULAR; INTRAVENOUS; SUBCUTANEOUS at 06:50

## 2023-01-01 RX ADMIN — Medication 4 MILLIGRAM(S): at 19:04

## 2023-01-01 RX ADMIN — SODIUM CHLORIDE 10 MILLILITER(S): 9 INJECTION INTRAMUSCULAR; INTRAVENOUS; SUBCUTANEOUS at 21:32

## 2023-01-01 RX ADMIN — SODIUM CHLORIDE 1000 MILLILITER(S): 9 INJECTION, SOLUTION INTRAVENOUS at 06:32

## 2023-01-01 RX ADMIN — CHLORHEXIDINE GLUCONATE 1 APPLICATION(S): 213 SOLUTION TOPICAL at 06:57

## 2023-01-01 RX ADMIN — SODIUM CHLORIDE 10 MILLILITER(S): 9 INJECTION INTRAMUSCULAR; INTRAVENOUS; SUBCUTANEOUS at 22:31

## 2023-01-01 RX ADMIN — Medication 4 MILLIGRAM(S): at 22:32

## 2023-01-01 RX ADMIN — SODIUM CHLORIDE 1000 MILLILITER(S): 9 INJECTION, SOLUTION INTRAVENOUS at 19:17

## 2023-01-01 RX ADMIN — Medication 5 MILLIGRAM(S): at 06:47

## 2023-01-01 RX ADMIN — HYDROMORPHONE HYDROCHLORIDE 0.5 MILLIGRAM(S): 2 INJECTION INTRAMUSCULAR; INTRAVENOUS; SUBCUTANEOUS at 03:24

## 2023-01-01 RX ADMIN — Medication 5 MILLIGRAM(S): at 12:40

## 2023-01-01 RX ADMIN — SODIUM CHLORIDE 1000 MILLILITER(S): 9 INJECTION, SOLUTION INTRAVENOUS at 14:57

## 2023-01-01 RX ADMIN — Medication 5 MILLIGRAM(S): at 05:30

## 2023-01-01 RX ADMIN — Medication 5 MILLIGRAM(S): at 05:35

## 2023-01-01 RX ADMIN — SODIUM CHLORIDE 10 MILLILITER(S): 9 INJECTION INTRAMUSCULAR; INTRAVENOUS; SUBCUTANEOUS at 05:23

## 2023-01-01 RX ADMIN — AMIODARONE HYDROCHLORIDE 100 MILLIGRAM(S): 400 TABLET ORAL at 05:58

## 2023-01-01 RX ADMIN — PANTOPRAZOLE SODIUM 40 MILLIGRAM(S): 20 TABLET, DELAYED RELEASE ORAL at 18:44

## 2023-01-01 RX ADMIN — PANTOPRAZOLE SODIUM 40 MILLIGRAM(S): 20 TABLET, DELAYED RELEASE ORAL at 19:31

## 2023-01-01 RX ADMIN — Medication 2 TABLET(S): at 06:02

## 2023-01-01 RX ADMIN — CHLORHEXIDINE GLUCONATE 1 APPLICATION(S): 213 SOLUTION TOPICAL at 07:27

## 2023-01-01 RX ADMIN — POTASSIUM PHOSPHATE, MONOBASIC POTASSIUM PHOSPHATE, DIBASIC 62.5 MILLIMOLE(S): 236; 224 INJECTION, SOLUTION INTRAVENOUS at 05:48

## 2023-01-01 RX ADMIN — SODIUM CHLORIDE 400 MILLILITER(S): 9 INJECTION, SOLUTION INTRAVENOUS at 23:08

## 2023-01-01 RX ADMIN — Medication 1 TABLET(S): at 12:15

## 2023-01-01 RX ADMIN — NYSTATIN CREAM 1 APPLICATION(S): 100000 CREAM TOPICAL at 18:00

## 2023-01-01 RX ADMIN — DAPTOMYCIN 120 MILLIGRAM(S): 500 INJECTION, POWDER, LYOPHILIZED, FOR SOLUTION INTRAVENOUS at 12:58

## 2023-01-01 RX ADMIN — MORPHINE 6 MILLIGRAM(S): 10 SOLUTION ORAL at 06:02

## 2023-01-01 RX ADMIN — HYDROMORPHONE HYDROCHLORIDE 0.5 MILLIGRAM(S): 2 INJECTION INTRAMUSCULAR; INTRAVENOUS; SUBCUTANEOUS at 05:42

## 2023-01-01 RX ADMIN — SODIUM CHLORIDE 10 MILLILITER(S): 9 INJECTION INTRAMUSCULAR; INTRAVENOUS; SUBCUTANEOUS at 21:37

## 2023-01-01 RX ADMIN — Medication 500 MILLIGRAM(S): at 12:40

## 2023-01-01 RX ADMIN — CHOLESTYRAMINE 2 GRAM(S): 4 POWDER, FOR SUSPENSION ORAL at 21:55

## 2023-01-01 RX ADMIN — Medication 2 TABLET(S): at 23:00

## 2023-01-01 RX ADMIN — ENOXAPARIN SODIUM 65 MILLIGRAM(S): 100 INJECTION SUBCUTANEOUS at 07:19

## 2023-01-01 RX ADMIN — Medication 1 EACH: at 18:55

## 2023-01-01 RX ADMIN — Medication 2 TABLET(S): at 05:56

## 2023-01-01 RX ADMIN — HEPARIN SODIUM 16 UNIT(S)/HR: 5000 INJECTION INTRAVENOUS; SUBCUTANEOUS at 00:20

## 2023-01-01 RX ADMIN — I.V. FAT EMULSION 8.33 GM/KG/DAY: 20 EMULSION INTRAVENOUS at 22:28

## 2023-01-01 RX ADMIN — HYDROMORPHONE HYDROCHLORIDE 0.25 MILLIGRAM(S): 2 INJECTION INTRAMUSCULAR; INTRAVENOUS; SUBCUTANEOUS at 11:19

## 2023-01-01 RX ADMIN — MORPHINE 6 MILLIGRAM(S): 10 SOLUTION ORAL at 05:39

## 2023-01-01 RX ADMIN — Medication 25 MILLIGRAM(S): at 05:13

## 2023-01-01 RX ADMIN — Medication 650 MILLIGRAM(S): at 09:40

## 2023-01-01 RX ADMIN — TIGECYCLINE 105 MILLIGRAM(S): 50 INJECTION, POWDER, LYOPHILIZED, FOR SOLUTION INTRAVENOUS at 17:15

## 2023-01-01 RX ADMIN — HYDROMORPHONE HYDROCHLORIDE 0.25 MILLIGRAM(S): 2 INJECTION INTRAMUSCULAR; INTRAVENOUS; SUBCUTANEOUS at 23:08

## 2023-01-01 RX ADMIN — CHLORHEXIDINE GLUCONATE 1 APPLICATION(S): 213 SOLUTION TOPICAL at 06:16

## 2023-01-01 RX ADMIN — HEPARIN SODIUM 5000 UNIT(S): 5000 INJECTION INTRAVENOUS; SUBCUTANEOUS at 13:13

## 2023-01-01 RX ADMIN — Medication 2 TABLET(S): at 14:38

## 2023-01-01 RX ADMIN — I.V. FAT EMULSION 8.33 GM/KG/DAY: 20 EMULSION INTRAVENOUS at 17:08

## 2023-01-01 RX ADMIN — TIGECYCLINE 105 MILLIGRAM(S): 50 INJECTION, POWDER, LYOPHILIZED, FOR SOLUTION INTRAVENOUS at 05:18

## 2023-01-01 RX ADMIN — MIRTAZAPINE 30 MILLIGRAM(S): 45 TABLET, ORALLY DISINTEGRATING ORAL at 21:54

## 2023-01-01 RX ADMIN — HYDROMORPHONE HYDROCHLORIDE 0.5 MILLIGRAM(S): 2 INJECTION INTRAMUSCULAR; INTRAVENOUS; SUBCUTANEOUS at 20:48

## 2023-01-01 RX ADMIN — Medication 1 PACKET(S): at 05:26

## 2023-01-01 RX ADMIN — HYDROMORPHONE HYDROCHLORIDE 0.5 MILLIGRAM(S): 2 INJECTION INTRAMUSCULAR; INTRAVENOUS; SUBCUTANEOUS at 04:34

## 2023-01-01 RX ADMIN — SODIUM CHLORIDE 1000 MILLILITER(S): 9 INJECTION, SOLUTION INTRAVENOUS at 00:22

## 2023-01-01 RX ADMIN — I.V. FAT EMULSION 20.83 GM/KG/DAY: 20 EMULSION INTRAVENOUS at 18:00

## 2023-01-01 RX ADMIN — CHLORHEXIDINE GLUCONATE 1 APPLICATION(S): 213 SOLUTION TOPICAL at 07:08

## 2023-01-01 RX ADMIN — PANTOPRAZOLE SODIUM 40 MILLIGRAM(S): 20 TABLET, DELAYED RELEASE ORAL at 17:23

## 2023-01-01 RX ADMIN — DAPTOMYCIN 120 MILLIGRAM(S): 500 INJECTION, POWDER, LYOPHILIZED, FOR SOLUTION INTRAVENOUS at 12:07

## 2023-01-01 RX ADMIN — ENOXAPARIN SODIUM 65 MILLIGRAM(S): 100 INJECTION SUBCUTANEOUS at 18:49

## 2023-01-01 RX ADMIN — SODIUM CHLORIDE 10 MILLILITER(S): 9 INJECTION INTRAMUSCULAR; INTRAVENOUS; SUBCUTANEOUS at 14:09

## 2023-01-01 RX ADMIN — HYDROMORPHONE HYDROCHLORIDE 0.25 MILLIGRAM(S): 2 INJECTION INTRAMUSCULAR; INTRAVENOUS; SUBCUTANEOUS at 06:39

## 2023-01-01 RX ADMIN — SODIUM CHLORIDE 500 MILLILITER(S): 9 INJECTION, SOLUTION INTRAVENOUS at 19:54

## 2023-01-01 RX ADMIN — HYDROMORPHONE HYDROCHLORIDE 0.5 MILLIGRAM(S): 2 INJECTION INTRAMUSCULAR; INTRAVENOUS; SUBCUTANEOUS at 10:42

## 2023-01-01 RX ADMIN — SODIUM CHLORIDE 300 MILLILITER(S): 9 INJECTION, SOLUTION INTRAVENOUS at 12:16

## 2023-01-01 RX ADMIN — Medication 1 PACKET(S): at 14:42

## 2023-01-01 RX ADMIN — HYDROMORPHONE HYDROCHLORIDE 0.5 MILLIGRAM(S): 2 INJECTION INTRAMUSCULAR; INTRAVENOUS; SUBCUTANEOUS at 14:29

## 2023-01-01 RX ADMIN — SACUBITRIL AND VALSARTAN 1 TABLET(S): 24; 26 TABLET, FILM COATED ORAL at 10:24

## 2023-01-01 RX ADMIN — Medication 1 TABLET(S): at 11:22

## 2023-01-01 RX ADMIN — Medication 1 APPLICATION(S): at 12:27

## 2023-01-01 RX ADMIN — MIRTAZAPINE 15 MILLIGRAM(S): 45 TABLET, ORALLY DISINTEGRATING ORAL at 22:38

## 2023-01-01 RX ADMIN — Medication 25 GRAM(S): at 17:58

## 2023-01-01 RX ADMIN — Medication 500 MILLIGRAM(S): at 13:49

## 2023-01-01 RX ADMIN — AMIODARONE HYDROCHLORIDE 100 MILLIGRAM(S): 400 TABLET ORAL at 05:26

## 2023-01-01 RX ADMIN — Medication 4 MILLIGRAM(S): at 22:13

## 2023-01-01 RX ADMIN — SODIUM CHLORIDE 800 MILLILITER(S): 9 INJECTION, SOLUTION INTRAVENOUS at 08:38

## 2023-01-01 RX ADMIN — SODIUM CHLORIDE 200 MILLILITER(S): 9 INJECTION INTRAMUSCULAR; INTRAVENOUS; SUBCUTANEOUS at 00:40

## 2023-01-01 RX ADMIN — CHOLESTYRAMINE 2 GRAM(S): 4 POWDER, FOR SUSPENSION ORAL at 05:00

## 2023-01-01 RX ADMIN — MEROPENEM 100 MILLIGRAM(S): 1 INJECTION INTRAVENOUS at 02:29

## 2023-01-01 RX ADMIN — ENOXAPARIN SODIUM 60 MILLIGRAM(S): 100 INJECTION SUBCUTANEOUS at 05:13

## 2023-01-01 RX ADMIN — ZINC SULFATE TAB 220 MG (50 MG ZINC EQUIVALENT) 220 MILLIGRAM(S): 220 (50 ZN) TAB at 12:00

## 2023-01-01 RX ADMIN — Medication 4 MILLIGRAM(S): at 23:00

## 2023-01-01 RX ADMIN — Medication 2 TABLET(S): at 18:56

## 2023-01-01 RX ADMIN — MORPHINE 6 MILLIGRAM(S): 10 SOLUTION ORAL at 17:46

## 2023-01-01 RX ADMIN — ENOXAPARIN SODIUM 60 MILLIGRAM(S): 100 INJECTION SUBCUTANEOUS at 06:42

## 2023-01-01 RX ADMIN — SODIUM CHLORIDE 10 MILLILITER(S): 9 INJECTION INTRAMUSCULAR; INTRAVENOUS; SUBCUTANEOUS at 21:46

## 2023-01-01 RX ADMIN — SACUBITRIL AND VALSARTAN 1 TABLET(S): 24; 26 TABLET, FILM COATED ORAL at 21:29

## 2023-01-01 RX ADMIN — MIRTAZAPINE 15 MILLIGRAM(S): 45 TABLET, ORALLY DISINTEGRATING ORAL at 22:26

## 2023-01-01 RX ADMIN — SPIRONOLACTONE 25 MILLIGRAM(S): 25 TABLET, FILM COATED ORAL at 05:51

## 2023-01-01 RX ADMIN — Medication 5 MILLIGRAM(S): at 18:08

## 2023-01-01 RX ADMIN — Medication 500 MILLIGRAM(S): at 11:22

## 2023-01-01 RX ADMIN — ZINC SULFATE TAB 220 MG (50 MG ZINC EQUIVALENT) 220 MILLIGRAM(S): 220 (50 ZN) TAB at 11:59

## 2023-01-01 RX ADMIN — Medication 2 TABLET(S): at 22:11

## 2023-01-01 RX ADMIN — NYSTATIN CREAM 1 APPLICATION(S): 100000 CREAM TOPICAL at 18:37

## 2023-01-01 RX ADMIN — HYDROMORPHONE HYDROCHLORIDE 0.25 MILLIGRAM(S): 2 INJECTION INTRAMUSCULAR; INTRAVENOUS; SUBCUTANEOUS at 18:00

## 2023-01-01 RX ADMIN — Medication 25 MILLIGRAM(S): at 05:21

## 2023-01-01 RX ADMIN — Medication 500 MILLIGRAM(S): at 12:08

## 2023-01-01 RX ADMIN — SODIUM CHLORIDE 1000 MILLILITER(S): 9 INJECTION, SOLUTION INTRAVENOUS at 00:28

## 2023-01-01 RX ADMIN — ZINC SULFATE TAB 220 MG (50 MG ZINC EQUIVALENT) 220 MILLIGRAM(S): 220 (50 ZN) TAB at 12:03

## 2023-01-01 RX ADMIN — Medication 2 TABLET(S): at 14:43

## 2023-01-01 RX ADMIN — Medication 2 TABLET(S): at 22:15

## 2023-01-01 RX ADMIN — Medication 1 TABLET(S): at 11:46

## 2023-01-01 RX ADMIN — Medication 500 MILLIGRAM(S): at 12:02

## 2023-01-01 RX ADMIN — PANTOPRAZOLE SODIUM 40 MILLIGRAM(S): 20 TABLET, DELAYED RELEASE ORAL at 21:43

## 2023-01-01 RX ADMIN — Medication 25 MILLIGRAM(S): at 06:00

## 2023-01-01 RX ADMIN — Medication 5 MILLIGRAM(S): at 11:47

## 2023-01-01 RX ADMIN — ZINC SULFATE TAB 220 MG (50 MG ZINC EQUIVALENT) 220 MILLIGRAM(S): 220 (50 ZN) TAB at 11:05

## 2023-01-01 RX ADMIN — Medication 500 MILLIGRAM(S): at 10:42

## 2023-01-01 RX ADMIN — MORPHINE 6 MILLIGRAM(S): 10 SOLUTION ORAL at 09:56

## 2023-01-01 RX ADMIN — Medication 100 GRAM(S): at 08:37

## 2023-01-01 RX ADMIN — NYSTATIN CREAM 1 APPLICATION(S): 100000 CREAM TOPICAL at 17:39

## 2023-01-01 RX ADMIN — MORPHINE 6 MILLIGRAM(S): 10 SOLUTION ORAL at 05:55

## 2023-01-01 RX ADMIN — Medication 3 MILLIGRAM(S): at 22:43

## 2023-01-01 RX ADMIN — Medication 1 APPLICATION(S): at 19:01

## 2023-01-01 RX ADMIN — ENOXAPARIN SODIUM 60 MILLIGRAM(S): 100 INJECTION SUBCUTANEOUS at 01:03

## 2023-01-01 RX ADMIN — SPIRONOLACTONE 25 MILLIGRAM(S): 25 TABLET, FILM COATED ORAL at 07:18

## 2023-01-01 RX ADMIN — SODIUM CHLORIDE 500 MILLILITER(S): 9 INJECTION, SOLUTION INTRAVENOUS at 12:13

## 2023-01-01 RX ADMIN — PANTOPRAZOLE SODIUM 10 MG/HR: 20 TABLET, DELAYED RELEASE ORAL at 05:37

## 2023-01-01 RX ADMIN — MORPHINE 6 MILLIGRAM(S): 10 SOLUTION ORAL at 19:04

## 2023-01-01 RX ADMIN — Medication 650 MILLIGRAM(S): at 04:09

## 2023-01-01 RX ADMIN — Medication 400 MILLIGRAM(S): at 06:16

## 2023-01-01 RX ADMIN — PANTOPRAZOLE SODIUM 40 MILLIGRAM(S): 20 TABLET, DELAYED RELEASE ORAL at 17:45

## 2023-01-01 RX ADMIN — MORPHINE 6 MILLIGRAM(S): 10 SOLUTION ORAL at 18:01

## 2023-01-01 RX ADMIN — OXYCODONE HYDROCHLORIDE 5 MILLIGRAM(S): 5 TABLET ORAL at 04:05

## 2023-01-01 RX ADMIN — MIRTAZAPINE 30 MILLIGRAM(S): 45 TABLET, ORALLY DISINTEGRATING ORAL at 22:30

## 2023-01-01 RX ADMIN — Medication 650 MILLIGRAM(S): at 23:21

## 2023-01-01 RX ADMIN — ZINC SULFATE TAB 220 MG (50 MG ZINC EQUIVALENT) 220 MILLIGRAM(S): 220 (50 ZN) TAB at 12:27

## 2023-01-01 RX ADMIN — Medication 4 MILLIGRAM(S): at 13:08

## 2023-01-01 RX ADMIN — PANTOPRAZOLE SODIUM 40 MILLIGRAM(S): 20 TABLET, DELAYED RELEASE ORAL at 21:58

## 2023-01-01 RX ADMIN — ERTAPENEM SODIUM 120 MILLIGRAM(S): 1 INJECTION, POWDER, LYOPHILIZED, FOR SOLUTION INTRAMUSCULAR; INTRAVENOUS at 08:02

## 2023-01-01 RX ADMIN — Medication 116 MILLIGRAM(S): at 23:20

## 2023-01-01 RX ADMIN — Medication 400 MILLIGRAM(S): at 17:45

## 2023-01-01 RX ADMIN — I.V. FAT EMULSION 20.83 GM/KG/DAY: 20 EMULSION INTRAVENOUS at 22:14

## 2023-01-01 RX ADMIN — MORPHINE 6 MILLIGRAM(S): 10 SOLUTION ORAL at 18:26

## 2023-01-01 RX ADMIN — I.V. FAT EMULSION 20.83 GM/KG/DAY: 20 EMULSION INTRAVENOUS at 18:50

## 2023-01-01 RX ADMIN — Medication 500 MILLIGRAM(S): at 12:32

## 2023-01-01 RX ADMIN — SODIUM CHLORIDE 500 MILLILITER(S): 9 INJECTION, SOLUTION INTRAVENOUS at 00:03

## 2023-01-01 RX ADMIN — POTASSIUM PHOSPHATE, MONOBASIC POTASSIUM PHOSPHATE, DIBASIC 62.5 MILLIMOLE(S): 236; 224 INJECTION, SOLUTION INTRAVENOUS at 14:47

## 2023-01-01 RX ADMIN — Medication 2 TABLET(S): at 22:16

## 2023-01-01 RX ADMIN — HYDROMORPHONE HYDROCHLORIDE 0.25 MILLIGRAM(S): 2 INJECTION INTRAMUSCULAR; INTRAVENOUS; SUBCUTANEOUS at 18:43

## 2023-01-01 RX ADMIN — DAPTOMYCIN 120 MILLIGRAM(S): 500 INJECTION, POWDER, LYOPHILIZED, FOR SOLUTION INTRAVENOUS at 10:34

## 2023-01-01 RX ADMIN — Medication 650 MILLIGRAM(S): at 06:50

## 2023-01-01 RX ADMIN — Medication 25 MILLIGRAM(S): at 09:16

## 2023-01-01 RX ADMIN — SODIUM CHLORIDE 150 MILLILITER(S): 9 INJECTION, SOLUTION INTRAVENOUS at 14:07

## 2023-01-01 RX ADMIN — Medication 11 MICROGRAM(S)/KG/MIN: at 16:28

## 2023-01-01 RX ADMIN — Medication 2: at 11:22

## 2023-01-01 RX ADMIN — I.V. FAT EMULSION 20.8 GM/KG/DAY: 20 EMULSION INTRAVENOUS at 22:14

## 2023-01-01 RX ADMIN — HYDROMORPHONE HYDROCHLORIDE 0.25 MILLIGRAM(S): 2 INJECTION INTRAMUSCULAR; INTRAVENOUS; SUBCUTANEOUS at 15:13

## 2023-01-01 RX ADMIN — Medication 4 MILLIGRAM(S): at 05:10

## 2023-01-01 RX ADMIN — PANTOPRAZOLE SODIUM 40 MILLIGRAM(S): 20 TABLET, DELAYED RELEASE ORAL at 10:45

## 2023-01-01 RX ADMIN — PIPERACILLIN AND TAZOBACTAM 25 GRAM(S): 4; .5 INJECTION, POWDER, LYOPHILIZED, FOR SOLUTION INTRAVENOUS at 00:15

## 2023-01-01 RX ADMIN — SACUBITRIL AND VALSARTAN 1 TABLET(S): 24; 26 TABLET, FILM COATED ORAL at 06:18

## 2023-01-01 RX ADMIN — Medication 1 TABLET(S): at 12:32

## 2023-01-01 RX ADMIN — Medication 63 EACH: at 14:04

## 2023-01-01 RX ADMIN — Medication 25 MILLIGRAM(S): at 06:41

## 2023-01-01 RX ADMIN — PANTOPRAZOLE SODIUM 40 MILLIGRAM(S): 20 TABLET, DELAYED RELEASE ORAL at 21:54

## 2023-01-01 RX ADMIN — HYDROMORPHONE HYDROCHLORIDE 0.5 MILLIGRAM(S): 2 INJECTION INTRAMUSCULAR; INTRAVENOUS; SUBCUTANEOUS at 15:29

## 2023-01-01 RX ADMIN — Medication 650 MILLIGRAM(S): at 06:12

## 2023-01-01 RX ADMIN — PANTOPRAZOLE SODIUM 40 MILLIGRAM(S): 20 TABLET, DELAYED RELEASE ORAL at 05:55

## 2023-01-01 RX ADMIN — Medication 400 MILLIGRAM(S): at 13:16

## 2023-01-01 RX ADMIN — Medication 1 PACKET(S): at 06:34

## 2023-01-01 RX ADMIN — ERTAPENEM SODIUM 120 MILLIGRAM(S): 1 INJECTION, POWDER, LYOPHILIZED, FOR SOLUTION INTRAMUSCULAR; INTRAVENOUS at 11:20

## 2023-01-01 RX ADMIN — Medication 5 MILLILITER(S): at 03:19

## 2023-01-01 RX ADMIN — MORPHINE 6 MILLIGRAM(S): 10 SOLUTION ORAL at 18:17

## 2023-01-01 RX ADMIN — HYDROMORPHONE HYDROCHLORIDE 0.5 MILLIGRAM(S): 2 INJECTION INTRAMUSCULAR; INTRAVENOUS; SUBCUTANEOUS at 11:25

## 2023-01-01 RX ADMIN — Medication 2 TABLET(S): at 15:18

## 2023-01-01 RX ADMIN — Medication 650 MILLIGRAM(S): at 18:40

## 2023-01-01 RX ADMIN — AMIODARONE HYDROCHLORIDE 100 MILLIGRAM(S): 400 TABLET ORAL at 06:14

## 2023-01-01 RX ADMIN — Medication 400 MILLIGRAM(S): at 00:50

## 2023-01-01 RX ADMIN — I.V. FAT EMULSION 20.83 GM/KG/DAY: 20 EMULSION INTRAVENOUS at 23:30

## 2023-01-01 RX ADMIN — ENOXAPARIN SODIUM 60 MILLIGRAM(S): 100 INJECTION SUBCUTANEOUS at 08:07

## 2023-01-01 RX ADMIN — Medication 2: at 16:33

## 2023-01-01 RX ADMIN — Medication 4 MILLIGRAM(S): at 22:45

## 2023-01-01 RX ADMIN — Medication 1000 MILLIGRAM(S): at 00:00

## 2023-01-01 RX ADMIN — MORPHINE 6 MILLIGRAM(S): 10 SOLUTION ORAL at 14:48

## 2023-01-01 RX ADMIN — Medication 25 GRAM(S): at 07:31

## 2023-01-01 RX ADMIN — Medication 25 MILLIGRAM(S): at 06:11

## 2023-01-01 RX ADMIN — ERTAPENEM SODIUM 120 MILLIGRAM(S): 1 INJECTION, POWDER, LYOPHILIZED, FOR SOLUTION INTRAMUSCULAR; INTRAVENOUS at 11:59

## 2023-01-01 RX ADMIN — TIGECYCLINE 105 MILLIGRAM(S): 50 INJECTION, POWDER, LYOPHILIZED, FOR SOLUTION INTRAVENOUS at 05:01

## 2023-01-01 RX ADMIN — Medication 2 TABLET(S): at 13:20

## 2023-01-01 RX ADMIN — Medication 2 TABLET(S): at 22:45

## 2023-01-01 RX ADMIN — SODIUM CHLORIDE 500 MILLILITER(S): 9 INJECTION, SOLUTION INTRAVENOUS at 23:36

## 2023-01-01 RX ADMIN — SODIUM CHLORIDE 100 MILLILITER(S): 9 INJECTION, SOLUTION INTRAVENOUS at 18:45

## 2023-01-01 RX ADMIN — MEROPENEM 100 MILLIGRAM(S): 1 INJECTION INTRAVENOUS at 11:05

## 2023-01-01 RX ADMIN — CHLORHEXIDINE GLUCONATE 1 APPLICATION(S): 213 SOLUTION TOPICAL at 11:13

## 2023-01-01 RX ADMIN — ERTAPENEM SODIUM 120 MILLIGRAM(S): 1 INJECTION, POWDER, LYOPHILIZED, FOR SOLUTION INTRAMUSCULAR; INTRAVENOUS at 09:45

## 2023-01-01 RX ADMIN — Medication 2: at 06:28

## 2023-01-01 RX ADMIN — TIGECYCLINE 105 MILLIGRAM(S): 50 INJECTION, POWDER, LYOPHILIZED, FOR SOLUTION INTRAVENOUS at 08:12

## 2023-01-01 RX ADMIN — Medication 4 MILLIGRAM(S): at 13:44

## 2023-01-01 RX ADMIN — Medication 4 MILLIGRAM(S): at 05:59

## 2023-01-01 RX ADMIN — MIRTAZAPINE 30 MILLIGRAM(S): 45 TABLET, ORALLY DISINTEGRATING ORAL at 21:16

## 2023-01-01 RX ADMIN — Medication 4 MILLIGRAM(S): at 05:00

## 2023-01-01 RX ADMIN — SODIUM CHLORIDE 400 MILLILITER(S): 9 INJECTION, SOLUTION INTRAVENOUS at 06:03

## 2023-01-01 RX ADMIN — I.V. FAT EMULSION 20.83 GM/KG/DAY: 20 EMULSION INTRAVENOUS at 20:55

## 2023-01-01 RX ADMIN — ENOXAPARIN SODIUM 60 MILLIGRAM(S): 100 INJECTION SUBCUTANEOUS at 06:39

## 2023-01-01 RX ADMIN — DAPTOMYCIN 120 MILLIGRAM(S): 500 INJECTION, POWDER, LYOPHILIZED, FOR SOLUTION INTRAVENOUS at 12:20

## 2023-01-01 RX ADMIN — Medication 1 PACKET(S): at 06:18

## 2023-01-01 RX ADMIN — Medication 25 MILLIGRAM(S): at 06:40

## 2023-01-01 RX ADMIN — SODIUM CHLORIDE 1000 MILLILITER(S): 9 INJECTION, SOLUTION INTRAVENOUS at 14:24

## 2023-01-01 RX ADMIN — MORPHINE 6 MILLIGRAM(S): 10 SOLUTION ORAL at 01:02

## 2023-01-01 RX ADMIN — SODIUM CHLORIDE 1000 MILLILITER(S): 9 INJECTION INTRAMUSCULAR; INTRAVENOUS; SUBCUTANEOUS at 13:13

## 2023-01-01 RX ADMIN — Medication 1 APPLICATION(S): at 18:36

## 2023-01-01 RX ADMIN — HEPARIN SODIUM 14 UNIT(S)/HR: 5000 INJECTION INTRAVENOUS; SUBCUTANEOUS at 18:27

## 2023-01-01 RX ADMIN — NYSTATIN CREAM 1 APPLICATION(S): 100000 CREAM TOPICAL at 18:40

## 2023-01-01 RX ADMIN — ENOXAPARIN SODIUM 60 MILLIGRAM(S): 100 INJECTION SUBCUTANEOUS at 18:30

## 2023-01-01 RX ADMIN — Medication 1 TABLET(S): at 11:50

## 2023-01-01 RX ADMIN — I.V. FAT EMULSION 20.83 GM/KG/DAY: 20 EMULSION INTRAVENOUS at 19:01

## 2023-01-01 RX ADMIN — PANTOPRAZOLE SODIUM 40 MILLIGRAM(S): 20 TABLET, DELAYED RELEASE ORAL at 06:45

## 2023-01-01 RX ADMIN — Medication 2 TABLET(S): at 06:44

## 2023-01-01 RX ADMIN — Medication 4 MILLIGRAM(S): at 22:01

## 2023-01-01 RX ADMIN — HYDROMORPHONE HYDROCHLORIDE 0.5 MILLIGRAM(S): 2 INJECTION INTRAMUSCULAR; INTRAVENOUS; SUBCUTANEOUS at 15:10

## 2023-01-01 RX ADMIN — I.V. FAT EMULSION 20.83 GM/KG/DAY: 20 EMULSION INTRAVENOUS at 21:17

## 2023-01-01 RX ADMIN — HYDROMORPHONE HYDROCHLORIDE 0.25 MILLIGRAM(S): 2 INJECTION INTRAMUSCULAR; INTRAVENOUS; SUBCUTANEOUS at 20:33

## 2023-01-01 RX ADMIN — HYDROMORPHONE HYDROCHLORIDE 0.25 MILLIGRAM(S): 2 INJECTION INTRAMUSCULAR; INTRAVENOUS; SUBCUTANEOUS at 22:27

## 2023-01-01 RX ADMIN — Medication 4 MILLIGRAM(S): at 07:26

## 2023-01-01 RX ADMIN — Medication 25 MILLIGRAM(S): at 06:36

## 2023-01-01 RX ADMIN — Medication 5 MILLIGRAM(S): at 11:21

## 2023-01-01 RX ADMIN — Medication 4 MILLIGRAM(S): at 15:18

## 2023-01-01 RX ADMIN — Medication 500 MILLIGRAM(S): at 12:00

## 2023-01-01 RX ADMIN — OXYCODONE HYDROCHLORIDE 5 MILLIGRAM(S): 5 TABLET ORAL at 11:30

## 2023-01-01 RX ADMIN — ZINC SULFATE TAB 220 MG (50 MG ZINC EQUIVALENT) 220 MILLIGRAM(S): 220 (50 ZN) TAB at 12:06

## 2023-01-01 RX ADMIN — Medication 100 GRAM(S): at 06:38

## 2023-01-01 RX ADMIN — HYDROMORPHONE HYDROCHLORIDE 0.25 MILLIGRAM(S): 2 INJECTION INTRAMUSCULAR; INTRAVENOUS; SUBCUTANEOUS at 07:00

## 2023-01-01 RX ADMIN — PIPERACILLIN AND TAZOBACTAM 25 GRAM(S): 4; .5 INJECTION, POWDER, LYOPHILIZED, FOR SOLUTION INTRAVENOUS at 16:59

## 2023-01-01 RX ADMIN — Medication 1 APPLICATION(S): at 11:48

## 2023-01-01 RX ADMIN — SODIUM CHLORIDE 10 MILLILITER(S): 9 INJECTION INTRAMUSCULAR; INTRAVENOUS; SUBCUTANEOUS at 21:35

## 2023-01-01 RX ADMIN — CHLORHEXIDINE GLUCONATE 1 APPLICATION(S): 213 SOLUTION TOPICAL at 12:05

## 2023-01-01 RX ADMIN — Medication 1 PACKET(S): at 05:57

## 2023-01-01 RX ADMIN — MIRTAZAPINE 30 MILLIGRAM(S): 45 TABLET, ORALLY DISINTEGRATING ORAL at 21:55

## 2023-01-01 RX ADMIN — Medication 1 PACKET(S): at 06:26

## 2023-01-01 RX ADMIN — ENOXAPARIN SODIUM 65 MILLIGRAM(S): 100 INJECTION SUBCUTANEOUS at 17:34

## 2023-01-01 RX ADMIN — SODIUM CHLORIDE 325 MILLILITER(S): 9 INJECTION, SOLUTION INTRAVENOUS at 18:58

## 2023-01-01 RX ADMIN — ZINC SULFATE TAB 220 MG (50 MG ZINC EQUIVALENT) 220 MILLIGRAM(S): 220 (50 ZN) TAB at 11:12

## 2023-01-01 RX ADMIN — PIPERACILLIN AND TAZOBACTAM 25 GRAM(S): 4; .5 INJECTION, POWDER, LYOPHILIZED, FOR SOLUTION INTRAVENOUS at 00:19

## 2023-01-01 RX ADMIN — AMIODARONE HYDROCHLORIDE 100 MILLIGRAM(S): 400 TABLET ORAL at 05:48

## 2023-01-01 RX ADMIN — PANTOPRAZOLE SODIUM 40 MILLIGRAM(S): 20 TABLET, DELAYED RELEASE ORAL at 11:41

## 2023-01-01 RX ADMIN — HYDROMORPHONE HYDROCHLORIDE 0.5 MILLIGRAM(S): 2 INJECTION INTRAMUSCULAR; INTRAVENOUS; SUBCUTANEOUS at 16:22

## 2023-01-01 RX ADMIN — ENOXAPARIN SODIUM 65 MILLIGRAM(S): 100 INJECTION SUBCUTANEOUS at 18:39

## 2023-01-01 RX ADMIN — HEPARIN SODIUM 14 UNIT(S)/HR: 5000 INJECTION INTRAVENOUS; SUBCUTANEOUS at 09:45

## 2023-01-01 RX ADMIN — PANTOPRAZOLE SODIUM 40 MILLIGRAM(S): 20 TABLET, DELAYED RELEASE ORAL at 06:11

## 2023-01-01 RX ADMIN — AMIODARONE HYDROCHLORIDE 100 MILLIGRAM(S): 400 TABLET ORAL at 06:52

## 2023-01-01 RX ADMIN — Medication 1 EACH: at 19:44

## 2023-01-01 RX ADMIN — HYDROMORPHONE HYDROCHLORIDE 0.25 MILLIGRAM(S): 2 INJECTION INTRAMUSCULAR; INTRAVENOUS; SUBCUTANEOUS at 10:30

## 2023-01-01 RX ADMIN — PANTOPRAZOLE SODIUM 40 MILLIGRAM(S): 20 TABLET, DELAYED RELEASE ORAL at 09:09

## 2023-01-01 RX ADMIN — DAPTOMYCIN 120 MILLIGRAM(S): 500 INJECTION, POWDER, LYOPHILIZED, FOR SOLUTION INTRAVENOUS at 14:00

## 2023-01-01 RX ADMIN — SACUBITRIL AND VALSARTAN 1 TABLET(S): 24; 26 TABLET, FILM COATED ORAL at 06:27

## 2023-01-01 RX ADMIN — PANTOPRAZOLE SODIUM 40 MILLIGRAM(S): 20 TABLET, DELAYED RELEASE ORAL at 06:20

## 2023-01-01 RX ADMIN — Medication 4 MILLIGRAM(S): at 22:26

## 2023-01-01 RX ADMIN — Medication 400 MILLIGRAM(S): at 16:59

## 2023-01-01 RX ADMIN — NYSTATIN CREAM 1 APPLICATION(S): 100000 CREAM TOPICAL at 06:53

## 2023-01-01 RX ADMIN — I.V. FAT EMULSION 20.83 GM/KG/DAY: 20 EMULSION INTRAVENOUS at 23:18

## 2023-01-01 RX ADMIN — Medication 1 APPLICATION(S): at 14:48

## 2023-01-01 RX ADMIN — MORPHINE 6 MILLIGRAM(S): 10 SOLUTION ORAL at 07:07

## 2023-01-01 RX ADMIN — SODIUM CHLORIDE 500 MILLILITER(S): 9 INJECTION, SOLUTION INTRAVENOUS at 01:00

## 2023-01-01 RX ADMIN — SODIUM CHLORIDE 600 MILLILITER(S): 9 INJECTION, SOLUTION INTRAVENOUS at 12:23

## 2023-01-01 RX ADMIN — Medication 5 MILLIGRAM(S): at 01:05

## 2023-01-01 RX ADMIN — SODIUM CHLORIDE 500 MILLILITER(S): 9 INJECTION, SOLUTION INTRAVENOUS at 02:37

## 2023-01-01 RX ADMIN — PANTOPRAZOLE SODIUM 40 MILLIGRAM(S): 20 TABLET, DELAYED RELEASE ORAL at 19:06

## 2023-01-01 RX ADMIN — Medication 2 TABLET(S): at 05:37

## 2023-01-01 RX ADMIN — MIRTAZAPINE 30 MILLIGRAM(S): 45 TABLET, ORALLY DISINTEGRATING ORAL at 21:33

## 2023-01-01 RX ADMIN — PANTOPRAZOLE SODIUM 40 MILLIGRAM(S): 20 TABLET, DELAYED RELEASE ORAL at 10:43

## 2023-01-01 RX ADMIN — OXYCODONE HYDROCHLORIDE 5 MILLIGRAM(S): 5 TABLET ORAL at 07:51

## 2023-01-01 RX ADMIN — ENOXAPARIN SODIUM 65 MILLIGRAM(S): 100 INJECTION SUBCUTANEOUS at 05:49

## 2023-01-01 RX ADMIN — SODIUM CHLORIDE 500 MILLILITER(S): 9 INJECTION, SOLUTION INTRAVENOUS at 23:34

## 2023-01-01 RX ADMIN — Medication 1 PACKET(S): at 22:49

## 2023-01-01 RX ADMIN — Medication 1 TABLET(S): at 12:10

## 2023-01-01 RX ADMIN — SODIUM CHLORIDE 1200 MILLILITER(S): 9 INJECTION, SOLUTION INTRAVENOUS at 22:39

## 2023-01-01 RX ADMIN — CHOLESTYRAMINE 2 GRAM(S): 4 POWDER, FOR SUSPENSION ORAL at 14:49

## 2023-01-01 RX ADMIN — Medication 1 APPLICATION(S): at 22:30

## 2023-01-01 RX ADMIN — CHLORHEXIDINE GLUCONATE 1 APPLICATION(S): 213 SOLUTION TOPICAL at 06:37

## 2023-01-01 RX ADMIN — Medication 5 MILLIGRAM(S): at 17:21

## 2023-01-01 RX ADMIN — PANTOPRAZOLE SODIUM 40 MILLIGRAM(S): 20 TABLET, DELAYED RELEASE ORAL at 17:53

## 2023-01-01 RX ADMIN — Medication 1 TABLET(S): at 11:47

## 2023-01-01 RX ADMIN — SODIUM CHLORIDE 500 MILLILITER(S): 9 INJECTION, SOLUTION INTRAVENOUS at 12:45

## 2023-01-01 RX ADMIN — ENOXAPARIN SODIUM 65 MILLIGRAM(S): 100 INJECTION SUBCUTANEOUS at 06:21

## 2023-01-01 RX ADMIN — ZINC SULFATE TAB 220 MG (50 MG ZINC EQUIVALENT) 220 MILLIGRAM(S): 220 (50 ZN) TAB at 12:39

## 2023-01-01 RX ADMIN — OXYCODONE HYDROCHLORIDE 5 MILLIGRAM(S): 5 TABLET ORAL at 17:04

## 2023-01-01 RX ADMIN — ENOXAPARIN SODIUM 60 MILLIGRAM(S): 100 INJECTION SUBCUTANEOUS at 18:56

## 2023-01-01 RX ADMIN — Medication 400 MILLIGRAM(S): at 14:25

## 2023-01-01 RX ADMIN — SODIUM CHLORIDE 10 MILLILITER(S): 9 INJECTION INTRAMUSCULAR; INTRAVENOUS; SUBCUTANEOUS at 14:57

## 2023-01-01 RX ADMIN — ENOXAPARIN SODIUM 60 MILLIGRAM(S): 100 INJECTION SUBCUTANEOUS at 18:00

## 2023-01-01 RX ADMIN — AMIODARONE HYDROCHLORIDE 100 MILLIGRAM(S): 400 TABLET ORAL at 05:40

## 2023-01-01 RX ADMIN — Medication 400 MILLIGRAM(S): at 06:32

## 2023-01-01 RX ADMIN — Medication 1000 MILLIGRAM(S): at 18:34

## 2023-01-01 RX ADMIN — OXYCODONE HYDROCHLORIDE 5 MILLIGRAM(S): 5 TABLET ORAL at 00:42

## 2023-01-01 RX ADMIN — Medication 1000 MILLIGRAM(S): at 22:00

## 2023-01-01 RX ADMIN — SODIUM CHLORIDE 1000 MILLILITER(S): 9 INJECTION, SOLUTION INTRAVENOUS at 17:45

## 2023-01-01 RX ADMIN — Medication 1000 MILLIGRAM(S): at 17:29

## 2023-01-01 RX ADMIN — Medication 1 APPLICATION(S): at 12:26

## 2023-01-01 RX ADMIN — CHLORHEXIDINE GLUCONATE 1 APPLICATION(S): 213 SOLUTION TOPICAL at 06:33

## 2023-01-01 RX ADMIN — Medication 1 TABLET(S): at 11:18

## 2023-01-01 RX ADMIN — OXYCODONE HYDROCHLORIDE 5 MILLIGRAM(S): 5 TABLET ORAL at 13:00

## 2023-01-01 RX ADMIN — Medication 500 MILLIGRAM(S): at 11:29

## 2023-01-01 RX ADMIN — PANTOPRAZOLE SODIUM 40 MILLIGRAM(S): 20 TABLET, DELAYED RELEASE ORAL at 17:47

## 2023-01-01 RX ADMIN — ENOXAPARIN SODIUM 60 MILLIGRAM(S): 100 INJECTION SUBCUTANEOUS at 22:13

## 2023-01-01 RX ADMIN — CHLORHEXIDINE GLUCONATE 1 APPLICATION(S): 213 SOLUTION TOPICAL at 11:55

## 2023-01-01 RX ADMIN — MIRTAZAPINE 30 MILLIGRAM(S): 45 TABLET, ORALLY DISINTEGRATING ORAL at 21:17

## 2023-01-01 RX ADMIN — I.V. FAT EMULSION 8.33 GM/KG/DAY: 20 EMULSION INTRAVENOUS at 22:49

## 2023-01-01 RX ADMIN — OXYCODONE HYDROCHLORIDE 5 MILLIGRAM(S): 5 TABLET ORAL at 13:56

## 2023-01-01 RX ADMIN — MORPHINE 6 MILLIGRAM(S): 10 SOLUTION ORAL at 05:51

## 2023-01-01 RX ADMIN — TIGECYCLINE 105 MILLIGRAM(S): 50 INJECTION, POWDER, LYOPHILIZED, FOR SOLUTION INTRAVENOUS at 06:18

## 2023-01-01 RX ADMIN — I.V. FAT EMULSION 20.83 GM/KG/DAY: 20 EMULSION INTRAVENOUS at 19:42

## 2023-01-01 RX ADMIN — MIRTAZAPINE 30 MILLIGRAM(S): 45 TABLET, ORALLY DISINTEGRATING ORAL at 22:48

## 2023-01-01 RX ADMIN — Medication 2 TABLET(S): at 22:01

## 2023-01-01 RX ADMIN — NYSTATIN CREAM 1 APPLICATION(S): 100000 CREAM TOPICAL at 17:57

## 2023-01-01 RX ADMIN — AMIODARONE HYDROCHLORIDE 100 MILLIGRAM(S): 400 TABLET ORAL at 06:37

## 2023-01-01 RX ADMIN — NYSTATIN CREAM 1 APPLICATION(S): 100000 CREAM TOPICAL at 17:32

## 2023-01-01 RX ADMIN — SODIUM CHLORIDE 10 MILLILITER(S): 9 INJECTION INTRAMUSCULAR; INTRAVENOUS; SUBCUTANEOUS at 12:50

## 2023-01-01 RX ADMIN — SODIUM CHLORIDE 2000 MILLILITER(S): 9 INJECTION, SOLUTION INTRAVENOUS at 20:00

## 2023-01-01 RX ADMIN — SODIUM CHLORIDE 10 MILLILITER(S): 9 INJECTION INTRAMUSCULAR; INTRAVENOUS; SUBCUTANEOUS at 05:24

## 2023-01-01 RX ADMIN — Medication 2 TABLET(S): at 22:26

## 2023-01-01 RX ADMIN — SODIUM CHLORIDE 250 MILLILITER(S): 9 INJECTION, SOLUTION INTRAVENOUS at 14:35

## 2023-01-01 RX ADMIN — SODIUM CHLORIDE 10 MILLILITER(S): 9 INJECTION INTRAMUSCULAR; INTRAVENOUS; SUBCUTANEOUS at 06:07

## 2023-01-01 RX ADMIN — MIRTAZAPINE 30 MILLIGRAM(S): 45 TABLET, ORALLY DISINTEGRATING ORAL at 21:35

## 2023-01-01 RX ADMIN — SODIUM CHLORIDE 10 MILLILITER(S): 9 INJECTION INTRAMUSCULAR; INTRAVENOUS; SUBCUTANEOUS at 14:07

## 2023-01-01 RX ADMIN — Medication 1000 MILLIGRAM(S): at 06:47

## 2023-01-01 RX ADMIN — ENOXAPARIN SODIUM 60 MILLIGRAM(S): 100 INJECTION SUBCUTANEOUS at 05:39

## 2023-01-01 RX ADMIN — Medication 1 TABLET(S): at 11:09

## 2023-01-01 RX ADMIN — PIPERACILLIN AND TAZOBACTAM 25 GRAM(S): 4; .5 INJECTION, POWDER, LYOPHILIZED, FOR SOLUTION INTRAVENOUS at 08:12

## 2023-01-01 RX ADMIN — OXYCODONE HYDROCHLORIDE 5 MILLIGRAM(S): 5 TABLET ORAL at 09:40

## 2023-01-01 RX ADMIN — PANTOPRAZOLE SODIUM 40 MILLIGRAM(S): 20 TABLET, DELAYED RELEASE ORAL at 22:39

## 2023-01-01 RX ADMIN — CHLORHEXIDINE GLUCONATE 1 APPLICATION(S): 213 SOLUTION TOPICAL at 12:21

## 2023-01-01 RX ADMIN — SODIUM CHLORIDE 500 MILLILITER(S): 9 INJECTION, SOLUTION INTRAVENOUS at 20:28

## 2023-01-01 RX ADMIN — ENOXAPARIN SODIUM 60 MILLIGRAM(S): 100 INJECTION SUBCUTANEOUS at 19:33

## 2023-01-01 RX ADMIN — PANTOPRAZOLE SODIUM 40 MILLIGRAM(S): 20 TABLET, DELAYED RELEASE ORAL at 05:48

## 2023-01-01 RX ADMIN — ENOXAPARIN SODIUM 65 MILLIGRAM(S): 100 INJECTION SUBCUTANEOUS at 17:29

## 2023-01-01 RX ADMIN — Medication 4 MILLIGRAM(S): at 17:03

## 2023-01-01 RX ADMIN — MEROPENEM 100 MILLIGRAM(S): 1 INJECTION INTRAVENOUS at 11:51

## 2023-01-01 RX ADMIN — SODIUM CHLORIDE 125 MILLILITER(S): 9 INJECTION, SOLUTION INTRAVENOUS at 11:13

## 2023-01-01 RX ADMIN — ENOXAPARIN SODIUM 60 MILLIGRAM(S): 100 INJECTION SUBCUTANEOUS at 19:16

## 2023-01-01 RX ADMIN — MIRTAZAPINE 30 MILLIGRAM(S): 45 TABLET, ORALLY DISINTEGRATING ORAL at 21:36

## 2023-01-01 RX ADMIN — CHLORHEXIDINE GLUCONATE 1 APPLICATION(S): 213 SOLUTION TOPICAL at 12:14

## 2023-01-01 RX ADMIN — Medication 2 TABLET(S): at 22:14

## 2023-01-01 RX ADMIN — ENOXAPARIN SODIUM 65 MILLIGRAM(S): 100 INJECTION SUBCUTANEOUS at 06:00

## 2023-01-01 RX ADMIN — Medication 25 MILLIGRAM(S): at 05:41

## 2023-01-01 RX ADMIN — OXYCODONE HYDROCHLORIDE 5 MILLIGRAM(S): 5 TABLET ORAL at 03:12

## 2023-01-01 RX ADMIN — I.V. FAT EMULSION 20.83 GM/KG/DAY: 20 EMULSION INTRAVENOUS at 23:38

## 2023-01-01 RX ADMIN — CHOLESTYRAMINE 2 GRAM(S): 4 POWDER, FOR SUSPENSION ORAL at 21:54

## 2023-01-01 RX ADMIN — HYDROMORPHONE HYDROCHLORIDE 0.25 MILLIGRAM(S): 2 INJECTION INTRAMUSCULAR; INTRAVENOUS; SUBCUTANEOUS at 12:07

## 2023-01-01 RX ADMIN — HYDROMORPHONE HYDROCHLORIDE 0.5 MILLIGRAM(S): 2 INJECTION INTRAMUSCULAR; INTRAVENOUS; SUBCUTANEOUS at 00:30

## 2023-01-01 RX ADMIN — Medication 650 MILLIGRAM(S): at 12:54

## 2023-01-01 RX ADMIN — HYDROMORPHONE HYDROCHLORIDE 0.5 MILLIGRAM(S): 2 INJECTION INTRAMUSCULAR; INTRAVENOUS; SUBCUTANEOUS at 00:09

## 2023-01-01 RX ADMIN — Medication 1 TABLET(S): at 12:20

## 2023-01-01 RX ADMIN — Medication 5 MILLIGRAM(S): at 00:00

## 2023-01-01 RX ADMIN — SODIUM CHLORIDE 500 MILLILITER(S): 9 INJECTION, SOLUTION INTRAVENOUS at 00:46

## 2023-01-01 RX ADMIN — MEROPENEM 100 MILLIGRAM(S): 1 INJECTION INTRAVENOUS at 01:26

## 2023-01-01 RX ADMIN — Medication 1 TABLET(S): at 11:59

## 2023-01-01 RX ADMIN — OXYCODONE HYDROCHLORIDE 5 MILLIGRAM(S): 5 TABLET ORAL at 10:34

## 2023-01-01 RX ADMIN — Medication 2 TABLET(S): at 14:48

## 2023-01-01 RX ADMIN — Medication 500 MILLIGRAM(S): at 11:38

## 2023-01-01 RX ADMIN — HYDROMORPHONE HYDROCHLORIDE 0.25 MILLIGRAM(S): 2 INJECTION INTRAMUSCULAR; INTRAVENOUS; SUBCUTANEOUS at 17:36

## 2023-01-01 RX ADMIN — OXYCODONE HYDROCHLORIDE 5 MILLIGRAM(S): 5 TABLET ORAL at 16:04

## 2023-01-01 RX ADMIN — ENOXAPARIN SODIUM 60 MILLIGRAM(S): 100 INJECTION SUBCUTANEOUS at 18:43

## 2023-01-01 RX ADMIN — Medication 1000 MILLIGRAM(S): at 07:00

## 2023-01-01 RX ADMIN — HYDROMORPHONE HYDROCHLORIDE 0.25 MILLIGRAM(S): 2 INJECTION INTRAMUSCULAR; INTRAVENOUS; SUBCUTANEOUS at 11:59

## 2023-01-01 RX ADMIN — POTASSIUM PHOSPHATE, MONOBASIC POTASSIUM PHOSPHATE, DIBASIC 62.5 MILLIMOLE(S): 236; 224 INJECTION, SOLUTION INTRAVENOUS at 14:40

## 2023-01-01 RX ADMIN — NYSTATIN CREAM 1 APPLICATION(S): 100000 CREAM TOPICAL at 19:26

## 2023-01-01 RX ADMIN — Medication 400 MILLIGRAM(S): at 06:41

## 2023-01-01 RX ADMIN — Medication 2 TABLET(S): at 17:48

## 2023-01-01 RX ADMIN — SPIRONOLACTONE 25 MILLIGRAM(S): 25 TABLET, FILM COATED ORAL at 06:39

## 2023-01-01 RX ADMIN — SODIUM CHLORIDE 1000 MILLILITER(S): 9 INJECTION, SOLUTION INTRAVENOUS at 03:43

## 2023-01-01 RX ADMIN — MORPHINE 6 MILLIGRAM(S): 10 SOLUTION ORAL at 18:08

## 2023-01-01 RX ADMIN — Medication 4 MILLIGRAM(S): at 21:37

## 2023-01-01 RX ADMIN — Medication 1 APPLICATION(S): at 12:43

## 2023-01-01 RX ADMIN — HEPARIN SODIUM 14 UNIT(S)/HR: 5000 INJECTION INTRAVENOUS; SUBCUTANEOUS at 14:32

## 2023-01-01 RX ADMIN — HEPARIN SODIUM 12 UNIT(S)/HR: 5000 INJECTION INTRAVENOUS; SUBCUTANEOUS at 19:37

## 2023-01-01 RX ADMIN — Medication 25 MILLIGRAM(S): at 07:28

## 2023-01-01 RX ADMIN — AMIODARONE HYDROCHLORIDE 16.7 MG/MIN: 400 TABLET ORAL at 18:28

## 2023-01-01 RX ADMIN — Medication 1 APPLICATION(S): at 14:45

## 2023-01-01 RX ADMIN — PANTOPRAZOLE SODIUM 40 MILLIGRAM(S): 20 TABLET, DELAYED RELEASE ORAL at 07:26

## 2023-01-01 RX ADMIN — PIPERACILLIN AND TAZOBACTAM 25 GRAM(S): 4; .5 INJECTION, POWDER, LYOPHILIZED, FOR SOLUTION INTRAVENOUS at 09:56

## 2023-01-01 RX ADMIN — SODIUM CHLORIDE 10 MILLILITER(S): 9 INJECTION INTRAMUSCULAR; INTRAVENOUS; SUBCUTANEOUS at 07:14

## 2023-01-01 RX ADMIN — Medication 1000 MILLIGRAM(S): at 20:21

## 2023-01-01 RX ADMIN — PIPERACILLIN AND TAZOBACTAM 200 GRAM(S): 4; .5 INJECTION, POWDER, LYOPHILIZED, FOR SOLUTION INTRAVENOUS at 12:24

## 2023-01-01 RX ADMIN — SODIUM CHLORIDE 150 MILLILITER(S): 9 INJECTION, SOLUTION INTRAVENOUS at 09:11

## 2023-01-01 RX ADMIN — CHLORHEXIDINE GLUCONATE 1 APPLICATION(S): 213 SOLUTION TOPICAL at 11:12

## 2023-01-01 RX ADMIN — PANTOPRAZOLE SODIUM 40 MILLIGRAM(S): 20 TABLET, DELAYED RELEASE ORAL at 18:05

## 2023-01-01 RX ADMIN — SODIUM CHLORIDE 400 MILLILITER(S): 9 INJECTION INTRAMUSCULAR; INTRAVENOUS; SUBCUTANEOUS at 07:01

## 2023-01-01 RX ADMIN — Medication 1000 MILLIGRAM(S): at 18:43

## 2023-01-01 RX ADMIN — Medication 1 APPLICATION(S): at 11:21

## 2023-01-01 RX ADMIN — SODIUM CHLORIDE 675 MILLILITER(S): 9 INJECTION, SOLUTION INTRAVENOUS at 19:15

## 2023-01-01 RX ADMIN — Medication 5 MILLIGRAM(S): at 00:25

## 2023-01-01 RX ADMIN — CHOLESTYRAMINE 2 GRAM(S): 4 POWDER, FOR SUSPENSION ORAL at 14:46

## 2023-01-01 RX ADMIN — PANTOPRAZOLE SODIUM 40 MILLIGRAM(S): 20 TABLET, DELAYED RELEASE ORAL at 07:07

## 2023-01-01 RX ADMIN — Medication 4 MILLIGRAM(S): at 14:04

## 2023-01-01 RX ADMIN — Medication 4 MILLIGRAM(S): at 16:01

## 2023-01-01 RX ADMIN — Medication 650 MILLIGRAM(S): at 23:07

## 2023-01-01 RX ADMIN — CHLORHEXIDINE GLUCONATE 1 APPLICATION(S): 213 SOLUTION TOPICAL at 12:00

## 2023-01-01 RX ADMIN — DIATRIZOATE MEGLUMINE 30 MILLILITER(S): 180 INJECTION, SOLUTION INTRAVESICAL at 10:11

## 2023-01-01 RX ADMIN — MEROPENEM 100 MILLIGRAM(S): 1 INJECTION INTRAVENOUS at 10:23

## 2023-01-01 RX ADMIN — SODIUM CHLORIDE 1000 MILLILITER(S): 9 INJECTION, SOLUTION INTRAVENOUS at 22:05

## 2023-01-01 RX ADMIN — Medication 650 MILLIGRAM(S): at 14:44

## 2023-01-01 RX ADMIN — MORPHINE 6 MILLIGRAM(S): 10 SOLUTION ORAL at 06:45

## 2023-01-01 RX ADMIN — Medication 25 MILLIGRAM(S): at 06:25

## 2023-01-01 RX ADMIN — Medication 125 MILLILITER(S): at 04:51

## 2023-01-01 RX ADMIN — ENOXAPARIN SODIUM 60 MILLIGRAM(S): 100 INJECTION SUBCUTANEOUS at 11:16

## 2023-01-01 RX ADMIN — ENOXAPARIN SODIUM 60 MILLIGRAM(S): 100 INJECTION SUBCUTANEOUS at 17:47

## 2023-01-01 RX ADMIN — OXYCODONE HYDROCHLORIDE 5 MILLIGRAM(S): 5 TABLET ORAL at 14:46

## 2023-01-01 RX ADMIN — MORPHINE 6 MILLIGRAM(S): 10 SOLUTION ORAL at 06:15

## 2023-01-01 RX ADMIN — ZINC SULFATE TAB 220 MG (50 MG ZINC EQUIVALENT) 220 MILLIGRAM(S): 220 (50 ZN) TAB at 12:54

## 2023-01-01 RX ADMIN — Medication 2 TABLET(S): at 14:01

## 2023-01-01 RX ADMIN — Medication 2 TABLET(S): at 05:26

## 2023-01-01 RX ADMIN — Medication 2 TABLET(S): at 22:29

## 2023-01-01 RX ADMIN — PANTOPRAZOLE SODIUM 40 MILLIGRAM(S): 20 TABLET, DELAYED RELEASE ORAL at 11:47

## 2023-01-01 RX ADMIN — PIPERACILLIN AND TAZOBACTAM 25 GRAM(S): 4; .5 INJECTION, POWDER, LYOPHILIZED, FOR SOLUTION INTRAVENOUS at 15:36

## 2023-01-01 RX ADMIN — SACUBITRIL AND VALSARTAN 1 TABLET(S): 24; 26 TABLET, FILM COATED ORAL at 06:28

## 2023-01-01 RX ADMIN — HYDROMORPHONE HYDROCHLORIDE 0.5 MILLIGRAM(S): 2 INJECTION INTRAMUSCULAR; INTRAVENOUS; SUBCUTANEOUS at 15:18

## 2023-01-01 RX ADMIN — SODIUM CHLORIDE 100 MILLILITER(S): 9 INJECTION, SOLUTION INTRAVENOUS at 16:07

## 2023-01-01 RX ADMIN — Medication 5 MILLIGRAM(S): at 17:50

## 2023-01-01 RX ADMIN — HEPARIN SODIUM 5000 UNIT(S): 5000 INJECTION INTRAVENOUS; SUBCUTANEOUS at 21:45

## 2023-01-01 RX ADMIN — SODIUM CHLORIDE 500 MILLILITER(S): 9 INJECTION, SOLUTION INTRAVENOUS at 11:08

## 2023-01-01 RX ADMIN — OXYCODONE HYDROCHLORIDE 5 MILLIGRAM(S): 5 TABLET ORAL at 07:43

## 2023-01-01 RX ADMIN — Medication 1 TABLET(S): at 12:07

## 2023-01-01 RX ADMIN — Medication 25 MILLIGRAM(S): at 05:10

## 2023-01-01 RX ADMIN — AMIODARONE HYDROCHLORIDE 100 MILLIGRAM(S): 400 TABLET ORAL at 05:27

## 2023-01-01 RX ADMIN — HYDROMORPHONE HYDROCHLORIDE 0.25 MILLIGRAM(S): 2 INJECTION INTRAMUSCULAR; INTRAVENOUS; SUBCUTANEOUS at 19:09

## 2023-01-01 RX ADMIN — Medication 500 MILLIGRAM(S): at 11:45

## 2023-01-01 RX ADMIN — Medication 1000 MILLIGRAM(S): at 16:58

## 2023-01-01 RX ADMIN — PANTOPRAZOLE SODIUM 40 MILLIGRAM(S): 20 TABLET, DELAYED RELEASE ORAL at 17:01

## 2023-01-01 RX ADMIN — NYSTATIN CREAM 1 APPLICATION(S): 100000 CREAM TOPICAL at 06:49

## 2023-01-01 RX ADMIN — Medication 2 TABLET(S): at 06:26

## 2023-01-01 RX ADMIN — I.V. FAT EMULSION 20.83 GM/KG/DAY: 20 EMULSION INTRAVENOUS at 21:41

## 2023-01-01 RX ADMIN — Medication 1 TABLET(S): at 11:10

## 2023-01-01 RX ADMIN — Medication 1 PACKET(S): at 19:16

## 2023-01-01 RX ADMIN — PANTOPRAZOLE SODIUM 40 MILLIGRAM(S): 20 TABLET, DELAYED RELEASE ORAL at 09:23

## 2023-01-01 RX ADMIN — Medication 1 APPLICATION(S): at 11:22

## 2023-01-01 RX ADMIN — Medication 25 MILLIGRAM(S): at 05:55

## 2023-01-01 RX ADMIN — SODIUM CHLORIDE 500 MILLILITER(S): 9 INJECTION, SOLUTION INTRAVENOUS at 05:28

## 2023-01-01 RX ADMIN — Medication 2: at 12:52

## 2023-01-01 RX ADMIN — NYSTATIN CREAM 1 APPLICATION(S): 100000 CREAM TOPICAL at 18:05

## 2023-01-01 RX ADMIN — Medication 1 APPLICATION(S): at 19:07

## 2023-01-01 RX ADMIN — PANTOPRAZOLE SODIUM 40 MILLIGRAM(S): 20 TABLET, DELAYED RELEASE ORAL at 11:24

## 2023-01-01 RX ADMIN — TIGECYCLINE 110 MILLIGRAM(S): 50 INJECTION, POWDER, LYOPHILIZED, FOR SOLUTION INTRAVENOUS at 19:03

## 2023-01-01 RX ADMIN — Medication 1 PACKET(S): at 06:30

## 2023-01-01 RX ADMIN — SODIUM CHLORIDE 10 MILLILITER(S): 9 INJECTION INTRAMUSCULAR; INTRAVENOUS; SUBCUTANEOUS at 05:51

## 2023-01-01 RX ADMIN — CASPOFUNGIN ACETATE 260 MILLIGRAM(S): 7 INJECTION, POWDER, LYOPHILIZED, FOR SOLUTION INTRAVENOUS at 22:51

## 2023-01-01 RX ADMIN — CHLORHEXIDINE GLUCONATE 1 APPLICATION(S): 213 SOLUTION TOPICAL at 05:57

## 2023-01-01 RX ADMIN — SODIUM CHLORIDE 10 MILLILITER(S): 9 INJECTION INTRAMUSCULAR; INTRAVENOUS; SUBCUTANEOUS at 14:05

## 2023-01-01 RX ADMIN — MIRTAZAPINE 30 MILLIGRAM(S): 45 TABLET, ORALLY DISINTEGRATING ORAL at 22:31

## 2023-01-01 RX ADMIN — Medication 40 MILLIEQUIVALENT(S): at 06:47

## 2023-01-01 RX ADMIN — SODIUM CHLORIDE 10 MILLILITER(S): 9 INJECTION INTRAMUSCULAR; INTRAVENOUS; SUBCUTANEOUS at 23:19

## 2023-01-01 RX ADMIN — Medication 4 MILLIGRAM(S): at 14:21

## 2023-01-01 RX ADMIN — Medication 400 MILLIGRAM(S): at 17:40

## 2023-01-01 RX ADMIN — LIDOCAINE 1 PATCH: 4 CREAM TOPICAL at 19:01

## 2023-01-01 RX ADMIN — Medication 4 MILLIGRAM(S): at 22:15

## 2023-01-01 RX ADMIN — Medication 2 TABLET(S): at 16:12

## 2023-01-01 RX ADMIN — HYDROMORPHONE HYDROCHLORIDE 0.5 MILLIGRAM(S): 2 INJECTION INTRAMUSCULAR; INTRAVENOUS; SUBCUTANEOUS at 05:26

## 2023-01-01 RX ADMIN — Medication 4 MILLIGRAM(S): at 14:39

## 2023-01-01 RX ADMIN — PIPERACILLIN AND TAZOBACTAM 25 GRAM(S): 4; .5 INJECTION, POWDER, LYOPHILIZED, FOR SOLUTION INTRAVENOUS at 07:19

## 2023-01-01 RX ADMIN — ENOXAPARIN SODIUM 60 MILLIGRAM(S): 100 INJECTION SUBCUTANEOUS at 18:31

## 2023-01-01 RX ADMIN — PANTOPRAZOLE SODIUM 40 MILLIGRAM(S): 20 TABLET, DELAYED RELEASE ORAL at 11:57

## 2023-01-01 RX ADMIN — Medication 4 MILLIGRAM(S): at 06:14

## 2023-01-01 RX ADMIN — ENOXAPARIN SODIUM 60 MILLIGRAM(S): 100 INJECTION SUBCUTANEOUS at 06:22

## 2023-01-01 RX ADMIN — MORPHINE 6 MILLIGRAM(S): 10 SOLUTION ORAL at 22:23

## 2023-01-01 RX ADMIN — ENOXAPARIN SODIUM 60 MILLIGRAM(S): 100 INJECTION SUBCUTANEOUS at 17:00

## 2023-01-01 RX ADMIN — Medication 500 MILLIGRAM(S): at 12:50

## 2023-01-01 RX ADMIN — MORPHINE 6 MILLIGRAM(S): 10 SOLUTION ORAL at 17:53

## 2023-01-01 RX ADMIN — Medication 1 PACKET(S): at 21:35

## 2023-01-01 RX ADMIN — LIDOCAINE 1 PATCH: 4 CREAM TOPICAL at 18:34

## 2023-01-01 RX ADMIN — Medication 5 MILLIGRAM(S): at 23:30

## 2023-01-01 RX ADMIN — CHLORHEXIDINE GLUCONATE 1 APPLICATION(S): 213 SOLUTION TOPICAL at 12:53

## 2023-01-01 RX ADMIN — MORPHINE 6 MILLIGRAM(S): 10 SOLUTION ORAL at 06:40

## 2023-01-01 RX ADMIN — MORPHINE 6 MILLIGRAM(S): 10 SOLUTION ORAL at 04:17

## 2023-01-01 RX ADMIN — CHLORHEXIDINE GLUCONATE 1 APPLICATION(S): 213 SOLUTION TOPICAL at 11:39

## 2023-01-01 RX ADMIN — Medication 1 EACH: at 18:47

## 2023-01-01 RX ADMIN — PIPERACILLIN AND TAZOBACTAM 25 GRAM(S): 4; .5 INJECTION, POWDER, LYOPHILIZED, FOR SOLUTION INTRAVENOUS at 16:43

## 2023-01-01 RX ADMIN — Medication 4 MILLIGRAM(S): at 06:26

## 2023-01-01 RX ADMIN — PANTOPRAZOLE SODIUM 40 MILLIGRAM(S): 20 TABLET, DELAYED RELEASE ORAL at 10:11

## 2023-01-01 RX ADMIN — SODIUM CHLORIDE 250 MILLILITER(S): 9 INJECTION, SOLUTION INTRAVENOUS at 13:32

## 2023-01-01 RX ADMIN — Medication 4 MILLIGRAM(S): at 22:11

## 2023-01-01 RX ADMIN — Medication 1 TABLET(S): at 11:55

## 2023-01-01 RX ADMIN — Medication 5 MILLIGRAM(S): at 12:11

## 2023-01-01 RX ADMIN — AMIODARONE HYDROCHLORIDE 16.7 MG/MIN: 400 TABLET ORAL at 02:21

## 2023-01-01 RX ADMIN — CHLORHEXIDINE GLUCONATE 1 APPLICATION(S): 213 SOLUTION TOPICAL at 05:47

## 2023-01-01 RX ADMIN — MEROPENEM 100 MILLIGRAM(S): 1 INJECTION INTRAVENOUS at 19:05

## 2023-01-01 RX ADMIN — TIGECYCLINE 105 MILLIGRAM(S): 50 INJECTION, POWDER, LYOPHILIZED, FOR SOLUTION INTRAVENOUS at 18:00

## 2023-01-01 RX ADMIN — HYDROMORPHONE HYDROCHLORIDE 0.25 MILLIGRAM(S): 2 INJECTION INTRAMUSCULAR; INTRAVENOUS; SUBCUTANEOUS at 17:57

## 2023-01-01 RX ADMIN — Medication 650 MILLIGRAM(S): at 19:30

## 2023-01-01 RX ADMIN — Medication 25 MILLIGRAM(S): at 06:05

## 2023-01-01 RX ADMIN — Medication 25 GRAM(S): at 07:33

## 2023-01-01 RX ADMIN — SODIUM CHLORIDE 350 MILLILITER(S): 9 INJECTION, SOLUTION INTRAVENOUS at 14:02

## 2023-01-01 RX ADMIN — Medication 650 MILLIGRAM(S): at 06:00

## 2023-01-01 RX ADMIN — PANTOPRAZOLE SODIUM 40 MILLIGRAM(S): 20 TABLET, DELAYED RELEASE ORAL at 18:13

## 2023-01-01 RX ADMIN — ENOXAPARIN SODIUM 60 MILLIGRAM(S): 100 INJECTION SUBCUTANEOUS at 22:45

## 2023-01-01 RX ADMIN — SODIUM CHLORIDE 100 MILLILITER(S): 9 INJECTION, SOLUTION INTRAVENOUS at 15:30

## 2023-01-01 RX ADMIN — Medication 1000 MILLIGRAM(S): at 02:04

## 2023-01-01 RX ADMIN — Medication 4 MILLIGRAM(S): at 06:39

## 2023-01-01 RX ADMIN — ZINC SULFATE TAB 220 MG (50 MG ZINC EQUIVALENT) 220 MILLIGRAM(S): 220 (50 ZN) TAB at 11:18

## 2023-01-01 RX ADMIN — Medication 1 EACH: at 18:27

## 2023-01-01 RX ADMIN — HYDROMORPHONE HYDROCHLORIDE 0.25 MILLIGRAM(S): 2 INJECTION INTRAMUSCULAR; INTRAVENOUS; SUBCUTANEOUS at 17:21

## 2023-01-01 RX ADMIN — Medication 250 MILLIGRAM(S): at 19:03

## 2023-01-01 RX ADMIN — PANTOPRAZOLE SODIUM 40 MILLIGRAM(S): 20 TABLET, DELAYED RELEASE ORAL at 18:07

## 2023-01-01 RX ADMIN — Medication 1000 MILLIGRAM(S): at 05:20

## 2023-01-01 RX ADMIN — ENOXAPARIN SODIUM 60 MILLIGRAM(S): 100 INJECTION SUBCUTANEOUS at 12:28

## 2023-01-01 RX ADMIN — OXYCODONE HYDROCHLORIDE 5 MILLIGRAM(S): 5 TABLET ORAL at 19:58

## 2023-01-01 RX ADMIN — I.V. FAT EMULSION 20.83 GM/KG/DAY: 20 EMULSION INTRAVENOUS at 22:57

## 2023-01-01 RX ADMIN — PANTOPRAZOLE SODIUM 40 MILLIGRAM(S): 20 TABLET, DELAYED RELEASE ORAL at 05:56

## 2023-01-01 RX ADMIN — AMIODARONE HYDROCHLORIDE 200 MILLIGRAM(S): 400 TABLET ORAL at 06:40

## 2023-01-01 RX ADMIN — Medication 2: at 12:27

## 2023-01-01 RX ADMIN — HYDROMORPHONE HYDROCHLORIDE 0.25 MILLIGRAM(S): 2 INJECTION INTRAMUSCULAR; INTRAVENOUS; SUBCUTANEOUS at 02:19

## 2023-01-01 RX ADMIN — Medication 500 MILLIGRAM(S): at 12:43

## 2023-01-01 RX ADMIN — Medication 500 MILLIGRAM(S): at 11:20

## 2023-01-01 RX ADMIN — DAPTOMYCIN 120 MILLIGRAM(S): 500 INJECTION, POWDER, LYOPHILIZED, FOR SOLUTION INTRAVENOUS at 11:06

## 2023-01-01 RX ADMIN — Medication 5 MILLIGRAM(S): at 11:30

## 2023-01-01 RX ADMIN — Medication 4 MILLIGRAM(S): at 13:46

## 2023-01-01 RX ADMIN — ENOXAPARIN SODIUM 60 MILLIGRAM(S): 100 INJECTION SUBCUTANEOUS at 22:20

## 2023-01-01 RX ADMIN — CHLORHEXIDINE GLUCONATE 1 APPLICATION(S): 213 SOLUTION TOPICAL at 07:00

## 2023-01-01 RX ADMIN — CHLORHEXIDINE GLUCONATE 1 APPLICATION(S): 213 SOLUTION TOPICAL at 13:21

## 2023-01-01 RX ADMIN — PANTOPRAZOLE SODIUM 40 MILLIGRAM(S): 20 TABLET, DELAYED RELEASE ORAL at 17:09

## 2023-01-01 RX ADMIN — Medication 4 MILLIGRAM(S): at 07:50

## 2023-01-01 RX ADMIN — SACUBITRIL AND VALSARTAN 1 TABLET(S): 24; 26 TABLET, FILM COATED ORAL at 18:00

## 2023-01-01 RX ADMIN — SODIUM CHLORIDE 800 MILLILITER(S): 9 INJECTION, SOLUTION INTRAVENOUS at 09:31

## 2023-01-01 RX ADMIN — MIRTAZAPINE 30 MILLIGRAM(S): 45 TABLET, ORALLY DISINTEGRATING ORAL at 22:15

## 2023-01-01 RX ADMIN — OXYCODONE HYDROCHLORIDE 5 MILLIGRAM(S): 5 TABLET ORAL at 22:39

## 2023-01-01 RX ADMIN — OXYCODONE HYDROCHLORIDE 5 MILLIGRAM(S): 5 TABLET ORAL at 15:56

## 2023-01-01 RX ADMIN — ONDANSETRON 4 MILLIGRAM(S): 8 TABLET, FILM COATED ORAL at 16:52

## 2023-01-01 RX ADMIN — NYSTATIN CREAM 1 APPLICATION(S): 100000 CREAM TOPICAL at 06:19

## 2023-01-01 RX ADMIN — Medication 5 MILLIGRAM(S): at 18:34

## 2023-01-01 RX ADMIN — SODIUM CHLORIDE 500 MILLILITER(S): 9 INJECTION, SOLUTION INTRAVENOUS at 02:55

## 2023-01-01 RX ADMIN — Medication 5 MILLIGRAM(S): at 00:26

## 2023-01-01 RX ADMIN — SACUBITRIL AND VALSARTAN 1 TABLET(S): 24; 26 TABLET, FILM COATED ORAL at 10:14

## 2023-01-01 RX ADMIN — CHLORHEXIDINE GLUCONATE 1 APPLICATION(S): 213 SOLUTION TOPICAL at 13:42

## 2023-01-01 RX ADMIN — Medication 1 EACH: at 18:28

## 2023-01-01 RX ADMIN — I.V. FAT EMULSION 20.83 GM/KG/DAY: 20 EMULSION INTRAVENOUS at 19:37

## 2023-01-01 RX ADMIN — AMIODARONE HYDROCHLORIDE 100 MILLIGRAM(S): 400 TABLET ORAL at 06:32

## 2023-01-01 RX ADMIN — CASPOFUNGIN ACETATE 260 MILLIGRAM(S): 7 INJECTION, POWDER, LYOPHILIZED, FOR SOLUTION INTRAVENOUS at 21:32

## 2023-01-01 RX ADMIN — I.V. FAT EMULSION 20.83 GM/KG/DAY: 20 EMULSION INTRAVENOUS at 17:32

## 2023-01-01 RX ADMIN — SODIUM CHLORIDE 500 MILLILITER(S): 9 INJECTION, SOLUTION INTRAVENOUS at 06:21

## 2023-01-01 RX ADMIN — HEPARIN SODIUM 7 UNIT(S)/HR: 5000 INJECTION INTRAVENOUS; SUBCUTANEOUS at 11:24

## 2023-01-01 RX ADMIN — HYDROMORPHONE HYDROCHLORIDE 0.25 MILLIGRAM(S): 2 INJECTION INTRAMUSCULAR; INTRAVENOUS; SUBCUTANEOUS at 10:32

## 2023-01-01 RX ADMIN — HEPARIN SODIUM 1100 UNIT(S)/HR: 5000 INJECTION INTRAVENOUS; SUBCUTANEOUS at 13:03

## 2023-01-01 RX ADMIN — AMIODARONE HYDROCHLORIDE 100 MILLIGRAM(S): 400 TABLET ORAL at 06:58

## 2023-01-01 RX ADMIN — ERTAPENEM SODIUM 120 MILLIGRAM(S): 1 INJECTION, POWDER, LYOPHILIZED, FOR SOLUTION INTRAMUSCULAR; INTRAVENOUS at 08:36

## 2023-01-01 RX ADMIN — MIRTAZAPINE 30 MILLIGRAM(S): 45 TABLET, ORALLY DISINTEGRATING ORAL at 22:24

## 2023-01-01 RX ADMIN — HYDROMORPHONE HYDROCHLORIDE 0.25 MILLIGRAM(S): 2 INJECTION INTRAMUSCULAR; INTRAVENOUS; SUBCUTANEOUS at 07:45

## 2023-01-01 RX ADMIN — SODIUM CHLORIDE 500 MILLILITER(S): 9 INJECTION, SOLUTION INTRAVENOUS at 19:20

## 2023-01-01 RX ADMIN — Medication 4 MILLIGRAM(S): at 06:31

## 2023-01-01 RX ADMIN — ENOXAPARIN SODIUM 65 MILLIGRAM(S): 100 INJECTION SUBCUTANEOUS at 18:45

## 2023-01-01 RX ADMIN — Medication 1 PACKET(S): at 05:12

## 2023-01-01 RX ADMIN — Medication 1 TABLET(S): at 12:43

## 2023-01-01 RX ADMIN — SODIUM CHLORIDE 750 MILLILITER(S): 9 INJECTION INTRAMUSCULAR; INTRAVENOUS; SUBCUTANEOUS at 21:50

## 2023-01-01 RX ADMIN — SODIUM CHLORIDE 500 MILLILITER(S): 9 INJECTION, SOLUTION INTRAVENOUS at 09:26

## 2023-01-01 RX ADMIN — Medication 5 MILLIGRAM(S): at 12:04

## 2023-01-01 RX ADMIN — PANTOPRAZOLE SODIUM 40 MILLIGRAM(S): 20 TABLET, DELAYED RELEASE ORAL at 06:41

## 2023-01-01 RX ADMIN — MORPHINE 6 MILLIGRAM(S): 10 SOLUTION ORAL at 05:00

## 2023-01-01 RX ADMIN — HYDROMORPHONE HYDROCHLORIDE 0.25 MILLIGRAM(S): 2 INJECTION INTRAMUSCULAR; INTRAVENOUS; SUBCUTANEOUS at 13:36

## 2023-01-01 RX ADMIN — SODIUM CHLORIDE 10 MILLILITER(S): 9 INJECTION INTRAMUSCULAR; INTRAVENOUS; SUBCUTANEOUS at 21:14

## 2023-01-01 RX ADMIN — SODIUM CHLORIDE 500 MILLILITER(S): 9 INJECTION, SOLUTION INTRAVENOUS at 07:00

## 2023-01-01 RX ADMIN — Medication 650 MILLIGRAM(S): at 18:21

## 2023-01-01 RX ADMIN — NYSTATIN CREAM 1 APPLICATION(S): 100000 CREAM TOPICAL at 05:49

## 2023-01-01 RX ADMIN — CHLORHEXIDINE GLUCONATE 1 APPLICATION(S): 213 SOLUTION TOPICAL at 18:46

## 2023-01-01 RX ADMIN — SODIUM CHLORIDE 700 MILLILITER(S): 9 INJECTION, SOLUTION INTRAVENOUS at 14:14

## 2023-01-01 RX ADMIN — PANTOPRAZOLE SODIUM 40 MILLIGRAM(S): 20 TABLET, DELAYED RELEASE ORAL at 05:10

## 2023-01-01 RX ADMIN — Medication 4 MILLIGRAM(S): at 16:12

## 2023-01-01 RX ADMIN — Medication 4 MILLIGRAM(S): at 22:43

## 2023-01-01 RX ADMIN — Medication 650 MILLIGRAM(S): at 18:13

## 2023-01-01 RX ADMIN — Medication 25 MILLIGRAM(S): at 06:14

## 2023-01-01 RX ADMIN — TIGECYCLINE 105 MILLIGRAM(S): 50 INJECTION, POWDER, LYOPHILIZED, FOR SOLUTION INTRAVENOUS at 17:32

## 2023-01-01 RX ADMIN — Medication 1000 MILLIGRAM(S): at 16:50

## 2023-01-01 RX ADMIN — AMIODARONE HYDROCHLORIDE 16.7 MG/MIN: 400 TABLET ORAL at 19:00

## 2023-01-01 RX ADMIN — Medication 400 MILLIGRAM(S): at 23:52

## 2023-01-01 RX ADMIN — MORPHINE 6 MILLIGRAM(S): 10 SOLUTION ORAL at 18:39

## 2023-01-01 RX ADMIN — HYDROMORPHONE HYDROCHLORIDE 0.5 MILLIGRAM(S): 2 INJECTION INTRAMUSCULAR; INTRAVENOUS; SUBCUTANEOUS at 09:40

## 2023-01-01 RX ADMIN — DIATRIZOATE MEGLUMINE 30 MILLILITER(S): 180 INJECTION, SOLUTION INTRAVESICAL at 10:06

## 2023-01-01 RX ADMIN — MORPHINE 6 MILLIGRAM(S): 10 SOLUTION ORAL at 17:22

## 2023-01-01 RX ADMIN — HYDROMORPHONE HYDROCHLORIDE 0.25 MILLIGRAM(S): 2 INJECTION INTRAMUSCULAR; INTRAVENOUS; SUBCUTANEOUS at 16:10

## 2023-01-01 RX ADMIN — Medication 4 MILLIGRAM(S): at 05:40

## 2023-01-01 RX ADMIN — AMIODARONE HYDROCHLORIDE 100 MILLIGRAM(S): 400 TABLET ORAL at 05:46

## 2023-01-01 RX ADMIN — Medication 4 MILLIGRAM(S): at 13:01

## 2023-01-01 RX ADMIN — Medication 25 MILLIGRAM(S): at 06:02

## 2023-01-01 RX ADMIN — HYDROMORPHONE HYDROCHLORIDE 0.5 MILLIGRAM(S): 2 INJECTION INTRAMUSCULAR; INTRAVENOUS; SUBCUTANEOUS at 08:49

## 2023-01-01 RX ADMIN — LIDOCAINE 1 PATCH: 4 CREAM TOPICAL at 06:44

## 2023-01-01 RX ADMIN — Medication 650 MILLIGRAM(S): at 19:00

## 2023-01-01 RX ADMIN — SODIUM CHLORIDE 10 MILLILITER(S): 9 INJECTION INTRAMUSCULAR; INTRAVENOUS; SUBCUTANEOUS at 06:19

## 2023-01-01 RX ADMIN — Medication 5 MILLIGRAM(S): at 19:34

## 2023-01-01 RX ADMIN — Medication 100 GRAM(S): at 12:39

## 2023-01-01 RX ADMIN — MEROPENEM 100 MILLIGRAM(S): 1 INJECTION INTRAVENOUS at 19:22

## 2023-01-01 RX ADMIN — Medication 1 APPLICATION(S): at 11:33

## 2023-01-01 RX ADMIN — Medication 1 EACH: at 19:03

## 2023-01-01 RX ADMIN — OXYCODONE HYDROCHLORIDE 5 MILLIGRAM(S): 5 TABLET ORAL at 02:40

## 2023-01-01 RX ADMIN — PANTOPRAZOLE SODIUM 40 MILLIGRAM(S): 20 TABLET, DELAYED RELEASE ORAL at 07:18

## 2023-01-01 RX ADMIN — Medication 2 TABLET(S): at 07:21

## 2023-01-01 RX ADMIN — SODIUM CHLORIDE 500 MILLILITER(S): 9 INJECTION INTRAMUSCULAR; INTRAVENOUS; SUBCUTANEOUS at 15:20

## 2023-01-01 RX ADMIN — SODIUM CHLORIDE 1000 MILLILITER(S): 9 INJECTION INTRAMUSCULAR; INTRAVENOUS; SUBCUTANEOUS at 10:06

## 2023-01-01 RX ADMIN — SODIUM CHLORIDE 10 MILLILITER(S): 9 INJECTION INTRAMUSCULAR; INTRAVENOUS; SUBCUTANEOUS at 14:13

## 2023-01-01 RX ADMIN — NYSTATIN CREAM 1 APPLICATION(S): 100000 CREAM TOPICAL at 17:21

## 2023-01-01 RX ADMIN — HYDROMORPHONE HYDROCHLORIDE 0.25 MILLIGRAM(S): 2 INJECTION INTRAMUSCULAR; INTRAVENOUS; SUBCUTANEOUS at 14:25

## 2023-01-01 RX ADMIN — OXYCODONE HYDROCHLORIDE 5 MILLIGRAM(S): 5 TABLET ORAL at 15:30

## 2023-01-01 RX ADMIN — Medication 1 EACH: at 17:03

## 2023-01-01 RX ADMIN — Medication 250 MILLIGRAM(S): at 20:06

## 2023-01-01 RX ADMIN — PANTOPRAZOLE SODIUM 40 MILLIGRAM(S): 20 TABLET, DELAYED RELEASE ORAL at 12:10

## 2023-01-01 RX ADMIN — Medication 400 MILLIGRAM(S): at 00:02

## 2023-01-01 RX ADMIN — MIRTAZAPINE 30 MILLIGRAM(S): 45 TABLET, ORALLY DISINTEGRATING ORAL at 22:03

## 2023-01-01 RX ADMIN — Medication 650 MILLIGRAM(S): at 05:12

## 2023-01-01 RX ADMIN — Medication 650 MILLIGRAM(S): at 23:54

## 2023-01-01 RX ADMIN — Medication 2 TABLET(S): at 22:52

## 2023-01-01 RX ADMIN — HYDROMORPHONE HYDROCHLORIDE 0.25 MILLIGRAM(S): 2 INJECTION INTRAMUSCULAR; INTRAVENOUS; SUBCUTANEOUS at 08:35

## 2023-01-01 RX ADMIN — PIPERACILLIN AND TAZOBACTAM 25 GRAM(S): 4; .5 INJECTION, POWDER, LYOPHILIZED, FOR SOLUTION INTRAVENOUS at 07:44

## 2023-01-01 RX ADMIN — NYSTATIN CREAM 1 APPLICATION(S): 100000 CREAM TOPICAL at 17:07

## 2023-01-01 RX ADMIN — MIRTAZAPINE 30 MILLIGRAM(S): 45 TABLET, ORALLY DISINTEGRATING ORAL at 22:39

## 2023-01-01 RX ADMIN — Medication 5 MILLIGRAM(S): at 22:03

## 2023-01-01 RX ADMIN — Medication 1 EACH: at 19:16

## 2023-01-01 RX ADMIN — SODIUM CHLORIDE 100 MILLILITER(S): 9 INJECTION, SOLUTION INTRAVENOUS at 06:29

## 2023-01-01 RX ADMIN — Medication 2 TABLET(S): at 05:38

## 2023-01-01 RX ADMIN — Medication 2 TABLET(S): at 05:20

## 2023-01-01 RX ADMIN — SODIUM CHLORIDE 1000 MILLILITER(S): 9 INJECTION INTRAMUSCULAR; INTRAVENOUS; SUBCUTANEOUS at 06:25

## 2023-01-01 RX ADMIN — SODIUM CHLORIDE 100 MILLILITER(S): 9 INJECTION, SOLUTION INTRAVENOUS at 13:19

## 2023-01-01 RX ADMIN — SACUBITRIL AND VALSARTAN 1 TABLET(S): 24; 26 TABLET, FILM COATED ORAL at 22:30

## 2023-01-01 RX ADMIN — OXYCODONE HYDROCHLORIDE 5 MILLIGRAM(S): 5 TABLET ORAL at 13:03

## 2023-01-01 RX ADMIN — SACUBITRIL AND VALSARTAN 1 TABLET(S): 24; 26 TABLET, FILM COATED ORAL at 17:27

## 2023-01-01 RX ADMIN — HYDROMORPHONE HYDROCHLORIDE 0.25 MILLIGRAM(S): 2 INJECTION INTRAMUSCULAR; INTRAVENOUS; SUBCUTANEOUS at 22:33

## 2023-01-01 RX ADMIN — NYSTATIN CREAM 1 APPLICATION(S): 100000 CREAM TOPICAL at 05:28

## 2023-01-01 RX ADMIN — SODIUM CHLORIDE 1000 MILLILITER(S): 9 INJECTION, SOLUTION INTRAVENOUS at 14:45

## 2023-01-01 RX ADMIN — AMIODARONE HYDROCHLORIDE 100 MILLIGRAM(S): 400 TABLET ORAL at 05:56

## 2023-01-01 RX ADMIN — Medication 2 TABLET(S): at 06:17

## 2023-01-01 RX ADMIN — SODIUM CHLORIDE 250 MILLILITER(S): 9 INJECTION, SOLUTION INTRAVENOUS at 05:19

## 2023-01-01 RX ADMIN — MORPHINE 6 MILLIGRAM(S): 10 SOLUTION ORAL at 05:53

## 2023-01-01 RX ADMIN — Medication 400 MILLIGRAM(S): at 03:40

## 2023-01-01 RX ADMIN — TIGECYCLINE 105 MILLIGRAM(S): 50 INJECTION, POWDER, LYOPHILIZED, FOR SOLUTION INTRAVENOUS at 06:48

## 2023-01-01 RX ADMIN — LIDOCAINE 1 PATCH: 4 CREAM TOPICAL at 04:27

## 2023-01-01 RX ADMIN — Medication 4 MILLIGRAM(S): at 14:15

## 2023-01-01 RX ADMIN — Medication 650 MILLIGRAM(S): at 20:55

## 2023-01-01 RX ADMIN — Medication 400 MILLIGRAM(S): at 12:55

## 2023-01-01 RX ADMIN — Medication 4 MILLIGRAM(S): at 07:08

## 2023-01-01 RX ADMIN — PANTOPRAZOLE SODIUM 40 MILLIGRAM(S): 20 TABLET, DELAYED RELEASE ORAL at 17:31

## 2023-01-01 RX ADMIN — Medication 1 PACKET(S): at 19:29

## 2023-01-01 RX ADMIN — DAPTOMYCIN 120 MILLIGRAM(S): 500 INJECTION, POWDER, LYOPHILIZED, FOR SOLUTION INTRAVENOUS at 06:35

## 2023-01-01 RX ADMIN — OXYCODONE HYDROCHLORIDE 10 MILLIGRAM(S): 5 TABLET ORAL at 04:36

## 2023-01-01 RX ADMIN — Medication 5 MILLIGRAM(S): at 23:15

## 2023-01-01 RX ADMIN — Medication 2 TABLET(S): at 21:40

## 2023-01-01 RX ADMIN — Medication 2 TABLET(S): at 22:20

## 2023-01-01 RX ADMIN — Medication 2 TABLET(S): at 21:56

## 2023-01-01 RX ADMIN — Medication 1000 MILLIGRAM(S): at 01:20

## 2023-01-01 RX ADMIN — AMIODARONE HYDROCHLORIDE 16.7 MG/MIN: 400 TABLET ORAL at 06:21

## 2023-01-01 RX ADMIN — PANTOPRAZOLE SODIUM 40 MILLIGRAM(S): 20 TABLET, DELAYED RELEASE ORAL at 05:00

## 2023-01-01 RX ADMIN — Medication 1 APPLICATION(S): at 11:56

## 2023-01-01 RX ADMIN — MIRTAZAPINE 30 MILLIGRAM(S): 45 TABLET, ORALLY DISINTEGRATING ORAL at 22:42

## 2023-01-01 RX ADMIN — PIPERACILLIN AND TAZOBACTAM 25 GRAM(S): 4; .5 INJECTION, POWDER, LYOPHILIZED, FOR SOLUTION INTRAVENOUS at 08:07

## 2023-01-01 RX ADMIN — OXYCODONE HYDROCHLORIDE 5 MILLIGRAM(S): 5 TABLET ORAL at 22:33

## 2023-01-01 RX ADMIN — SODIUM CHLORIDE 500 MILLILITER(S): 9 INJECTION, SOLUTION INTRAVENOUS at 16:06

## 2023-01-01 RX ADMIN — ZINC SULFATE TAB 220 MG (50 MG ZINC EQUIVALENT) 220 MILLIGRAM(S): 220 (50 ZN) TAB at 11:22

## 2023-01-01 RX ADMIN — I.V. FAT EMULSION 20.83 GM/KG/DAY: 20 EMULSION INTRAVENOUS at 17:15

## 2023-01-01 RX ADMIN — Medication 1 EACH: at 18:22

## 2023-01-01 RX ADMIN — PANTOPRAZOLE SODIUM 40 MILLIGRAM(S): 20 TABLET, DELAYED RELEASE ORAL at 19:43

## 2023-01-01 RX ADMIN — Medication 1 APPLICATION(S): at 11:14

## 2023-01-01 RX ADMIN — Medication 1 EACH: at 18:18

## 2023-01-01 RX ADMIN — SODIUM CHLORIDE 1000 MILLILITER(S): 9 INJECTION, SOLUTION INTRAVENOUS at 06:10

## 2023-01-01 RX ADMIN — SODIUM CHLORIDE 10 MILLILITER(S): 9 INJECTION INTRAMUSCULAR; INTRAVENOUS; SUBCUTANEOUS at 06:36

## 2023-01-01 RX ADMIN — Medication 1000 MILLIGRAM(S): at 08:52

## 2023-01-01 RX ADMIN — AMIODARONE HYDROCHLORIDE 100 MILLIGRAM(S): 400 TABLET ORAL at 06:11

## 2023-01-01 RX ADMIN — I.V. FAT EMULSION 20.83 GM/KG/DAY: 20 EMULSION INTRAVENOUS at 21:15

## 2023-01-01 RX ADMIN — PANTOPRAZOLE SODIUM 40 MILLIGRAM(S): 20 TABLET, DELAYED RELEASE ORAL at 18:27

## 2023-01-01 RX ADMIN — Medication 1 EACH: at 17:47

## 2023-01-01 RX ADMIN — PANTOPRAZOLE SODIUM 40 MILLIGRAM(S): 20 TABLET, DELAYED RELEASE ORAL at 06:43

## 2023-01-01 RX ADMIN — PIPERACILLIN AND TAZOBACTAM 25 GRAM(S): 4; .5 INJECTION, POWDER, LYOPHILIZED, FOR SOLUTION INTRAVENOUS at 16:01

## 2023-01-01 RX ADMIN — Medication 400 MILLIGRAM(S): at 00:12

## 2023-01-01 RX ADMIN — Medication 1 APPLICATION(S): at 11:24

## 2023-01-01 RX ADMIN — Medication 1 APPLICATION(S): at 14:13

## 2023-01-01 RX ADMIN — Medication 2 TABLET(S): at 06:20

## 2023-01-01 RX ADMIN — SODIUM CHLORIDE 10 MILLILITER(S): 9 INJECTION INTRAMUSCULAR; INTRAVENOUS; SUBCUTANEOUS at 21:26

## 2023-01-01 RX ADMIN — ONDANSETRON 4 MILLIGRAM(S): 8 TABLET, FILM COATED ORAL at 11:16

## 2023-01-01 RX ADMIN — SODIUM CHLORIDE 750 MILLILITER(S): 9 INJECTION, SOLUTION INTRAVENOUS at 06:30

## 2023-01-01 RX ADMIN — MIRTAZAPINE 30 MILLIGRAM(S): 45 TABLET, ORALLY DISINTEGRATING ORAL at 22:45

## 2023-01-01 RX ADMIN — PANTOPRAZOLE SODIUM 40 MILLIGRAM(S): 20 TABLET, DELAYED RELEASE ORAL at 17:04

## 2023-01-01 RX ADMIN — SODIUM CHLORIDE 900 MILLILITER(S): 9 INJECTION, SOLUTION INTRAVENOUS at 22:40

## 2023-01-01 RX ADMIN — SODIUM CHLORIDE 1000 MILLILITER(S): 9 INJECTION, SOLUTION INTRAVENOUS at 18:31

## 2023-01-01 RX ADMIN — Medication 4 MILLIGRAM(S): at 13:32

## 2023-01-01 RX ADMIN — SODIUM CHLORIDE 200 MILLILITER(S): 9 INJECTION, SOLUTION INTRAVENOUS at 07:22

## 2023-01-01 RX ADMIN — HYDROMORPHONE HYDROCHLORIDE 0.25 MILLIGRAM(S): 2 INJECTION INTRAMUSCULAR; INTRAVENOUS; SUBCUTANEOUS at 07:28

## 2023-01-01 RX ADMIN — CHLORHEXIDINE GLUCONATE 1 APPLICATION(S): 213 SOLUTION TOPICAL at 12:43

## 2023-01-01 RX ADMIN — SODIUM CHLORIDE 10 MILLILITER(S): 9 INJECTION INTRAMUSCULAR; INTRAVENOUS; SUBCUTANEOUS at 11:21

## 2023-01-01 RX ADMIN — SODIUM CHLORIDE 10 MILLILITER(S): 9 INJECTION INTRAMUSCULAR; INTRAVENOUS; SUBCUTANEOUS at 14:48

## 2023-01-01 RX ADMIN — HYDROMORPHONE HYDROCHLORIDE 0.25 MILLIGRAM(S): 2 INJECTION INTRAMUSCULAR; INTRAVENOUS; SUBCUTANEOUS at 15:50

## 2023-01-01 RX ADMIN — PANTOPRAZOLE SODIUM 40 MILLIGRAM(S): 20 TABLET, DELAYED RELEASE ORAL at 07:20

## 2023-01-01 RX ADMIN — MORPHINE 6 MILLIGRAM(S): 10 SOLUTION ORAL at 09:22

## 2023-01-01 RX ADMIN — Medication 4 MILLIGRAM(S): at 18:31

## 2023-01-01 RX ADMIN — CHLORHEXIDINE GLUCONATE 1 APPLICATION(S): 213 SOLUTION TOPICAL at 20:31

## 2023-01-01 RX ADMIN — MIRTAZAPINE 30 MILLIGRAM(S): 45 TABLET, ORALLY DISINTEGRATING ORAL at 22:32

## 2023-01-01 RX ADMIN — SODIUM CHLORIDE 233.33 MILLILITER(S): 9 INJECTION, SOLUTION INTRAVENOUS at 12:46

## 2023-01-01 RX ADMIN — Medication 1 PACKET(S): at 13:31

## 2023-01-01 RX ADMIN — ZINC SULFATE TAB 220 MG (50 MG ZINC EQUIVALENT) 220 MILLIGRAM(S): 220 (50 ZN) TAB at 12:28

## 2023-01-01 RX ADMIN — Medication 25 MILLIGRAM(S): at 05:43

## 2023-01-01 RX ADMIN — DAPTOMYCIN 120 MILLIGRAM(S): 500 INJECTION, POWDER, LYOPHILIZED, FOR SOLUTION INTRAVENOUS at 14:21

## 2023-01-01 RX ADMIN — OXYCODONE HYDROCHLORIDE 5 MILLIGRAM(S): 5 TABLET ORAL at 10:45

## 2023-01-01 RX ADMIN — SODIUM CHLORIDE 1000 MILLILITER(S): 9 INJECTION, SOLUTION INTRAVENOUS at 19:54

## 2023-01-01 RX ADMIN — SODIUM CHLORIDE 125 MILLILITER(S): 9 INJECTION, SOLUTION INTRAVENOUS at 10:26

## 2023-01-01 RX ADMIN — MIRTAZAPINE 30 MILLIGRAM(S): 45 TABLET, ORALLY DISINTEGRATING ORAL at 22:52

## 2023-01-01 RX ADMIN — ZINC SULFATE TAB 220 MG (50 MG ZINC EQUIVALENT) 220 MILLIGRAM(S): 220 (50 ZN) TAB at 12:32

## 2023-01-01 RX ADMIN — HYDROMORPHONE HYDROCHLORIDE 0.25 MILLIGRAM(S): 2 INJECTION INTRAMUSCULAR; INTRAVENOUS; SUBCUTANEOUS at 02:49

## 2023-01-01 RX ADMIN — NYSTATIN CREAM 1 APPLICATION(S): 100000 CREAM TOPICAL at 18:01

## 2023-01-01 RX ADMIN — Medication 1 PACKET(S): at 22:42

## 2023-01-01 RX ADMIN — Medication 1 EACH: at 17:21

## 2023-01-01 RX ADMIN — Medication 2 TABLET(S): at 23:29

## 2023-01-01 RX ADMIN — SODIUM CHLORIDE 500 MILLILITER(S): 9 INJECTION, SOLUTION INTRAVENOUS at 14:30

## 2023-01-01 RX ADMIN — Medication 500 MILLIGRAM(S): at 12:24

## 2023-01-01 RX ADMIN — Medication 4 MILLIGRAM(S): at 06:32

## 2023-01-01 RX ADMIN — SODIUM CHLORIDE 1000 MILLILITER(S): 9 INJECTION INTRAMUSCULAR; INTRAVENOUS; SUBCUTANEOUS at 14:27

## 2023-01-01 RX ADMIN — CHLORHEXIDINE GLUCONATE 1 APPLICATION(S): 213 SOLUTION TOPICAL at 06:10

## 2023-01-01 RX ADMIN — ENOXAPARIN SODIUM 60 MILLIGRAM(S): 100 INJECTION SUBCUTANEOUS at 06:05

## 2023-01-01 RX ADMIN — MIRTAZAPINE 30 MILLIGRAM(S): 45 TABLET, ORALLY DISINTEGRATING ORAL at 23:25

## 2023-01-01 RX ADMIN — CHLORHEXIDINE GLUCONATE 1 APPLICATION(S): 213 SOLUTION TOPICAL at 06:39

## 2023-01-01 RX ADMIN — Medication 62.5 MILLIMOLE(S): at 07:27

## 2023-01-01 RX ADMIN — CHLORHEXIDINE GLUCONATE 1 APPLICATION(S): 213 SOLUTION TOPICAL at 12:39

## 2023-01-01 RX ADMIN — Medication 4 MILLIGRAM(S): at 21:36

## 2023-01-01 RX ADMIN — Medication 5 MILLIGRAM(S): at 23:49

## 2023-01-01 RX ADMIN — CHOLESTYRAMINE 2 GRAM(S): 4 POWDER, FOR SUSPENSION ORAL at 00:00

## 2023-01-01 RX ADMIN — Medication 400 MILLIGRAM(S): at 16:40

## 2023-01-01 RX ADMIN — CHOLESTYRAMINE 2 GRAM(S): 4 POWDER, FOR SUSPENSION ORAL at 06:19

## 2023-01-01 RX ADMIN — ENOXAPARIN SODIUM 60 MILLIGRAM(S): 100 INJECTION SUBCUTANEOUS at 11:10

## 2023-01-01 RX ADMIN — HYDROMORPHONE HYDROCHLORIDE 0.5 MILLIGRAM(S): 2 INJECTION INTRAMUSCULAR; INTRAVENOUS; SUBCUTANEOUS at 22:02

## 2023-01-01 RX ADMIN — OXYCODONE HYDROCHLORIDE 5 MILLIGRAM(S): 5 TABLET ORAL at 11:51

## 2023-01-01 RX ADMIN — Medication 400 MILLIGRAM(S): at 14:09

## 2023-01-01 RX ADMIN — SODIUM CHLORIDE 10 MILLILITER(S): 9 INJECTION INTRAMUSCULAR; INTRAVENOUS; SUBCUTANEOUS at 07:20

## 2023-01-01 RX ADMIN — Medication 5 MILLIGRAM(S): at 11:48

## 2023-01-01 RX ADMIN — I.V. FAT EMULSION 20.83 GM/KG/DAY: 20 EMULSION INTRAVENOUS at 23:06

## 2023-01-01 RX ADMIN — SODIUM CHLORIDE 10 MILLILITER(S): 9 INJECTION INTRAMUSCULAR; INTRAVENOUS; SUBCUTANEOUS at 12:02

## 2023-01-01 RX ADMIN — ZINC SULFATE TAB 220 MG (50 MG ZINC EQUIVALENT) 220 MILLIGRAM(S): 220 (50 ZN) TAB at 13:20

## 2023-01-01 RX ADMIN — SODIUM CHLORIDE 500 MILLILITER(S): 9 INJECTION, SOLUTION INTRAVENOUS at 02:46

## 2023-01-01 RX ADMIN — SODIUM CHLORIDE 550 MILLILITER(S): 9 INJECTION, SOLUTION INTRAVENOUS at 05:27

## 2023-01-01 RX ADMIN — Medication 400 MILLIGRAM(S): at 18:00

## 2023-01-01 RX ADMIN — CHLORHEXIDINE GLUCONATE 1 APPLICATION(S): 213 SOLUTION TOPICAL at 06:21

## 2023-01-01 RX ADMIN — Medication 650 MILLIGRAM(S): at 16:22

## 2023-01-01 RX ADMIN — Medication 2 TABLET(S): at 16:26

## 2023-01-01 RX ADMIN — MORPHINE 6 MILLIGRAM(S): 10 SOLUTION ORAL at 22:00

## 2023-01-01 RX ADMIN — Medication 1 EACH: at 18:08

## 2023-01-01 RX ADMIN — MORPHINE 6 MILLIGRAM(S): 10 SOLUTION ORAL at 05:10

## 2023-01-01 RX ADMIN — MORPHINE 6 MILLIGRAM(S): 10 SOLUTION ORAL at 12:17

## 2023-01-01 RX ADMIN — Medication 500 MILLIGRAM(S): at 11:18

## 2023-01-01 RX ADMIN — AMIODARONE HYDROCHLORIDE 200 MILLIGRAM(S): 400 TABLET ORAL at 06:02

## 2023-01-01 RX ADMIN — SODIUM CHLORIDE 10 MILLILITER(S): 9 INJECTION INTRAMUSCULAR; INTRAVENOUS; SUBCUTANEOUS at 18:36

## 2023-01-01 RX ADMIN — I.V. FAT EMULSION 20.83 GM/KG/DAY: 20 EMULSION INTRAVENOUS at 18:43

## 2023-01-01 RX ADMIN — SODIUM CHLORIDE 500 MILLILITER(S): 9 INJECTION, SOLUTION INTRAVENOUS at 21:32

## 2023-01-01 RX ADMIN — ENOXAPARIN SODIUM 60 MILLIGRAM(S): 100 INJECTION SUBCUTANEOUS at 22:31

## 2023-01-01 RX ADMIN — Medication 650 MILLIGRAM(S): at 22:13

## 2023-01-01 RX ADMIN — I.V. FAT EMULSION 20.8 GM/KG/DAY: 20 EMULSION INTRAVENOUS at 22:37

## 2023-01-01 RX ADMIN — HYDROMORPHONE HYDROCHLORIDE 0.5 MILLIGRAM(S): 2 INJECTION INTRAMUSCULAR; INTRAVENOUS; SUBCUTANEOUS at 11:33

## 2023-01-01 RX ADMIN — PIPERACILLIN AND TAZOBACTAM 25 GRAM(S): 4; .5 INJECTION, POWDER, LYOPHILIZED, FOR SOLUTION INTRAVENOUS at 07:04

## 2023-01-01 RX ADMIN — HYDROMORPHONE HYDROCHLORIDE 0.5 MILLIGRAM(S): 2 INJECTION INTRAMUSCULAR; INTRAVENOUS; SUBCUTANEOUS at 01:56

## 2023-01-01 RX ADMIN — DAPTOMYCIN 120 MILLIGRAM(S): 500 INJECTION, POWDER, LYOPHILIZED, FOR SOLUTION INTRAVENOUS at 18:10

## 2023-01-01 RX ADMIN — Medication 25 MILLIGRAM(S): at 06:44

## 2023-01-01 RX ADMIN — Medication 1 EACH: at 18:01

## 2023-01-01 RX ADMIN — SODIUM CHLORIDE 10 MILLILITER(S): 9 INJECTION INTRAMUSCULAR; INTRAVENOUS; SUBCUTANEOUS at 22:30

## 2023-01-01 RX ADMIN — Medication 500 MILLIGRAM(S): at 12:20

## 2023-01-01 RX ADMIN — PANTOPRAZOLE SODIUM 40 MILLIGRAM(S): 20 TABLET, DELAYED RELEASE ORAL at 22:50

## 2023-01-01 RX ADMIN — HYDROMORPHONE HYDROCHLORIDE 0.5 MILLIGRAM(S): 2 INJECTION INTRAMUSCULAR; INTRAVENOUS; SUBCUTANEOUS at 09:26

## 2023-01-01 RX ADMIN — CHOLESTYRAMINE 2 GRAM(S): 4 POWDER, FOR SUSPENSION ORAL at 05:37

## 2023-01-01 RX ADMIN — Medication 25 MILLIGRAM(S): at 06:18

## 2023-01-01 RX ADMIN — Medication 2 TABLET(S): at 22:33

## 2023-01-01 RX ADMIN — Medication 1 PACKET(S): at 13:42

## 2023-01-01 RX ADMIN — MORPHINE 6 MILLIGRAM(S): 10 SOLUTION ORAL at 06:29

## 2023-01-01 RX ADMIN — Medication 650 MILLIGRAM(S): at 06:27

## 2023-01-01 RX ADMIN — SODIUM CHLORIDE 350 MILLILITER(S): 9 INJECTION, SOLUTION INTRAVENOUS at 04:40

## 2023-01-01 RX ADMIN — OXYCODONE HYDROCHLORIDE 5 MILLIGRAM(S): 5 TABLET ORAL at 03:42

## 2023-01-01 RX ADMIN — Medication 1 PACKET(S): at 17:48

## 2023-01-01 RX ADMIN — HYDROMORPHONE HYDROCHLORIDE 0.25 MILLIGRAM(S): 2 INJECTION INTRAMUSCULAR; INTRAVENOUS; SUBCUTANEOUS at 07:16

## 2023-01-01 RX ADMIN — Medication 1 EACH: at 22:37

## 2023-01-01 RX ADMIN — Medication 5 MILLIGRAM(S): at 12:19

## 2023-01-01 RX ADMIN — I.V. FAT EMULSION 20.8 GM/KG/DAY: 20 EMULSION INTRAVENOUS at 22:13

## 2023-01-01 RX ADMIN — HYDROMORPHONE HYDROCHLORIDE 0.5 MILLIGRAM(S): 2 INJECTION INTRAMUSCULAR; INTRAVENOUS; SUBCUTANEOUS at 07:38

## 2023-01-01 RX ADMIN — Medication 650 MILLIGRAM(S): at 22:30

## 2023-01-01 RX ADMIN — PANTOPRAZOLE SODIUM 40 MILLIGRAM(S): 20 TABLET, DELAYED RELEASE ORAL at 21:52

## 2023-01-01 RX ADMIN — Medication 1 TABLET(S): at 13:02

## 2023-01-01 RX ADMIN — AMIODARONE HYDROCHLORIDE 400 MILLIGRAM(S): 400 TABLET ORAL at 11:07

## 2023-01-01 RX ADMIN — Medication 4 MILLIGRAM(S): at 05:51

## 2023-01-01 RX ADMIN — Medication 2 TABLET(S): at 14:14

## 2023-01-01 RX ADMIN — Medication 5 MILLIGRAM(S): at 17:26

## 2023-01-01 RX ADMIN — Medication 650 MILLIGRAM(S): at 23:32

## 2023-01-01 RX ADMIN — PANTOPRAZOLE SODIUM 40 MILLIGRAM(S): 20 TABLET, DELAYED RELEASE ORAL at 17:16

## 2023-01-01 RX ADMIN — OXYCODONE HYDROCHLORIDE 5 MILLIGRAM(S): 5 TABLET ORAL at 18:45

## 2023-01-01 RX ADMIN — Medication 1 APPLICATION(S): at 11:30

## 2023-01-01 RX ADMIN — Medication 500 MILLIGRAM(S): at 12:51

## 2023-01-01 RX ADMIN — SODIUM CHLORIDE 1000 MILLILITER(S): 9 INJECTION, SOLUTION INTRAVENOUS at 05:00

## 2023-01-01 RX ADMIN — Medication 1 EACH: at 17:27

## 2023-01-01 RX ADMIN — MORPHINE 6 MILLIGRAM(S): 10 SOLUTION ORAL at 05:57

## 2023-01-01 RX ADMIN — Medication 650 MILLIGRAM(S): at 14:20

## 2023-01-01 RX ADMIN — HYDROMORPHONE HYDROCHLORIDE 0.25 MILLIGRAM(S): 2 INJECTION INTRAMUSCULAR; INTRAVENOUS; SUBCUTANEOUS at 23:10

## 2023-01-01 RX ADMIN — AMIODARONE HYDROCHLORIDE 400 MILLIGRAM(S): 400 TABLET ORAL at 02:18

## 2023-01-01 RX ADMIN — Medication 25 MILLIGRAM(S): at 07:07

## 2023-01-01 RX ADMIN — ENOXAPARIN SODIUM 65 MILLIGRAM(S): 100 INJECTION SUBCUTANEOUS at 06:28

## 2023-01-01 RX ADMIN — Medication 500 MILLIGRAM(S): at 12:22

## 2023-01-01 RX ADMIN — AMIODARONE HYDROCHLORIDE 16.7 MG/MIN: 400 TABLET ORAL at 02:01

## 2023-01-01 RX ADMIN — HYDROMORPHONE HYDROCHLORIDE 0.25 MILLIGRAM(S): 2 INJECTION INTRAMUSCULAR; INTRAVENOUS; SUBCUTANEOUS at 21:06

## 2023-01-01 RX ADMIN — PIPERACILLIN AND TAZOBACTAM 25 GRAM(S): 4; .5 INJECTION, POWDER, LYOPHILIZED, FOR SOLUTION INTRAVENOUS at 09:35

## 2023-01-01 RX ADMIN — PANTOPRAZOLE SODIUM 40 MILLIGRAM(S): 20 TABLET, DELAYED RELEASE ORAL at 18:31

## 2023-01-01 RX ADMIN — Medication 5 MILLIGRAM(S): at 22:50

## 2023-01-01 RX ADMIN — Medication 5 MILLIGRAM(S): at 16:20

## 2023-01-01 RX ADMIN — LIDOCAINE 1 PATCH: 4 CREAM TOPICAL at 06:54

## 2023-01-01 RX ADMIN — NYSTATIN CREAM 1 APPLICATION(S): 100000 CREAM TOPICAL at 18:24

## 2023-01-01 RX ADMIN — ENOXAPARIN SODIUM 60 MILLIGRAM(S): 100 INJECTION SUBCUTANEOUS at 06:02

## 2023-01-01 RX ADMIN — SODIUM CHLORIDE 10 MILLILITER(S): 9 INJECTION INTRAMUSCULAR; INTRAVENOUS; SUBCUTANEOUS at 06:03

## 2023-01-01 RX ADMIN — OXYCODONE HYDROCHLORIDE 5 MILLIGRAM(S): 5 TABLET ORAL at 21:40

## 2023-01-01 RX ADMIN — Medication 1000 MILLIGRAM(S): at 03:55

## 2023-01-01 RX ADMIN — HEPARIN SODIUM 5000 UNIT(S): 5000 INJECTION INTRAVENOUS; SUBCUTANEOUS at 06:17

## 2023-01-01 RX ADMIN — PANTOPRAZOLE SODIUM 40 MILLIGRAM(S): 20 TABLET, DELAYED RELEASE ORAL at 06:05

## 2023-01-01 RX ADMIN — Medication 5 MILLIGRAM(S): at 12:50

## 2023-01-01 RX ADMIN — Medication 5 MILLIGRAM(S): at 05:55

## 2023-01-01 RX ADMIN — PANTOPRAZOLE SODIUM 40 MILLIGRAM(S): 20 TABLET, DELAYED RELEASE ORAL at 22:51

## 2023-01-01 RX ADMIN — Medication 650 MILLIGRAM(S): at 06:11

## 2023-01-01 RX ADMIN — SODIUM CHLORIDE 500 MILLILITER(S): 9 INJECTION INTRAMUSCULAR; INTRAVENOUS; SUBCUTANEOUS at 18:00

## 2023-01-01 RX ADMIN — SODIUM CHLORIDE 1000 MILLILITER(S): 9 INJECTION, SOLUTION INTRAVENOUS at 10:20

## 2023-01-01 RX ADMIN — PANTOPRAZOLE SODIUM 40 MILLIGRAM(S): 20 TABLET, DELAYED RELEASE ORAL at 12:41

## 2023-01-01 RX ADMIN — ENOXAPARIN SODIUM 60 MILLIGRAM(S): 100 INJECTION SUBCUTANEOUS at 23:36

## 2023-01-01 RX ADMIN — SODIUM CHLORIDE 750 MILLILITER(S): 9 INJECTION, SOLUTION INTRAVENOUS at 00:09

## 2023-01-01 RX ADMIN — Medication 4 MILLIGRAM(S): at 17:17

## 2023-01-01 RX ADMIN — Medication 25 MILLIGRAM(S): at 05:00

## 2023-01-01 RX ADMIN — OXYCODONE HYDROCHLORIDE 5 MILLIGRAM(S): 5 TABLET ORAL at 03:07

## 2023-01-01 RX ADMIN — Medication 650 MILLIGRAM(S): at 14:45

## 2023-01-01 RX ADMIN — HYDROMORPHONE HYDROCHLORIDE 0.25 MILLIGRAM(S): 2 INJECTION INTRAMUSCULAR; INTRAVENOUS; SUBCUTANEOUS at 02:57

## 2023-01-01 RX ADMIN — Medication 400 MILLIGRAM(S): at 22:34

## 2023-01-01 RX ADMIN — Medication 4 MILLIGRAM(S): at 22:36

## 2023-01-01 RX ADMIN — ENOXAPARIN SODIUM 65 MILLIGRAM(S): 100 INJECTION SUBCUTANEOUS at 06:08

## 2023-01-01 RX ADMIN — PANTOPRAZOLE SODIUM 40 MILLIGRAM(S): 20 TABLET, DELAYED RELEASE ORAL at 06:01

## 2023-01-01 RX ADMIN — Medication 400 MILLIGRAM(S): at 01:37

## 2023-01-01 RX ADMIN — Medication 500 MILLIGRAM(S): at 12:23

## 2023-01-01 RX ADMIN — PANTOPRAZOLE SODIUM 40 MILLIGRAM(S): 20 TABLET, DELAYED RELEASE ORAL at 06:28

## 2023-01-01 RX ADMIN — SODIUM CHLORIDE 10 MILLILITER(S): 9 INJECTION INTRAMUSCULAR; INTRAVENOUS; SUBCUTANEOUS at 22:08

## 2023-01-01 RX ADMIN — ERTAPENEM SODIUM 120 MILLIGRAM(S): 1 INJECTION, POWDER, LYOPHILIZED, FOR SOLUTION INTRAMUSCULAR; INTRAVENOUS at 12:06

## 2023-01-01 RX ADMIN — Medication 1 EACH: at 18:37

## 2023-01-01 RX ADMIN — ZINC SULFATE TAB 220 MG (50 MG ZINC EQUIVALENT) 220 MILLIGRAM(S): 220 (50 ZN) TAB at 11:09

## 2023-01-01 RX ADMIN — Medication 650 MILLIGRAM(S): at 22:07

## 2023-01-01 RX ADMIN — SODIUM CHLORIDE 1000 MILLILITER(S): 9 INJECTION, SOLUTION INTRAVENOUS at 01:31

## 2023-01-01 RX ADMIN — MORPHINE 6 MILLIGRAM(S): 10 SOLUTION ORAL at 17:49

## 2023-01-01 RX ADMIN — NYSTATIN CREAM 1 APPLICATION(S): 100000 CREAM TOPICAL at 05:01

## 2023-01-01 RX ADMIN — Medication 1000 MILLIGRAM(S): at 12:00

## 2023-01-01 RX ADMIN — Medication 2 TABLET(S): at 06:18

## 2023-01-01 RX ADMIN — MORPHINE 6 MILLIGRAM(S): 10 SOLUTION ORAL at 21:23

## 2023-01-01 RX ADMIN — CHLORHEXIDINE GLUCONATE 1 APPLICATION(S): 213 SOLUTION TOPICAL at 08:30

## 2023-01-01 RX ADMIN — DIATRIZOATE MEGLUMINE 30 MILLILITER(S): 180 INJECTION, SOLUTION INTRAVESICAL at 19:26

## 2023-01-01 RX ADMIN — Medication 1 APPLICATION(S): at 17:50

## 2023-01-01 RX ADMIN — PANTOPRAZOLE SODIUM 40 MILLIGRAM(S): 20 TABLET, DELAYED RELEASE ORAL at 18:22

## 2023-01-01 RX ADMIN — NYSTATIN CREAM 1 APPLICATION(S): 100000 CREAM TOPICAL at 17:26

## 2023-01-01 RX ADMIN — AMIODARONE HYDROCHLORIDE 16.7 MG/MIN: 400 TABLET ORAL at 09:42

## 2023-01-01 RX ADMIN — MORPHINE 6 MILLIGRAM(S): 10 SOLUTION ORAL at 05:56

## 2023-01-01 RX ADMIN — SODIUM CHLORIDE 10 MILLILITER(S): 9 INJECTION INTRAMUSCULAR; INTRAVENOUS; SUBCUTANEOUS at 06:37

## 2023-01-01 RX ADMIN — DAPTOMYCIN 120 MILLIGRAM(S): 500 INJECTION, POWDER, LYOPHILIZED, FOR SOLUTION INTRAVENOUS at 10:11

## 2023-01-01 RX ADMIN — AMIODARONE HYDROCHLORIDE 100 MILLIGRAM(S): 400 TABLET ORAL at 06:18

## 2023-01-01 RX ADMIN — Medication 1 APPLICATION(S): at 11:28

## 2023-01-01 RX ADMIN — I.V. FAT EMULSION 20.83 GM/KG/DAY: 20 EMULSION INTRAVENOUS at 18:38

## 2023-01-01 RX ADMIN — Medication 5 MILLIGRAM(S): at 10:18

## 2023-01-01 RX ADMIN — MIRTAZAPINE 15 MILLIGRAM(S): 45 TABLET, ORALLY DISINTEGRATING ORAL at 22:33

## 2023-01-01 RX ADMIN — PANTOPRAZOLE SODIUM 40 MILLIGRAM(S): 20 TABLET, DELAYED RELEASE ORAL at 05:26

## 2023-01-01 RX ADMIN — MORPHINE 6 MILLIGRAM(S): 10 SOLUTION ORAL at 18:22

## 2023-01-01 RX ADMIN — PANTOPRAZOLE SODIUM 40 MILLIGRAM(S): 20 TABLET, DELAYED RELEASE ORAL at 19:22

## 2023-01-01 RX ADMIN — PANTOPRAZOLE SODIUM 40 MILLIGRAM(S): 20 TABLET, DELAYED RELEASE ORAL at 06:09

## 2023-01-01 RX ADMIN — MIRTAZAPINE 30 MILLIGRAM(S): 45 TABLET, ORALLY DISINTEGRATING ORAL at 22:14

## 2023-01-01 RX ADMIN — ENOXAPARIN SODIUM 60 MILLIGRAM(S): 100 INJECTION SUBCUTANEOUS at 12:19

## 2023-01-01 RX ADMIN — Medication 500 MILLIGRAM(S): at 11:17

## 2023-01-01 RX ADMIN — Medication 1 APPLICATION(S): at 11:59

## 2023-01-01 RX ADMIN — HYDROMORPHONE HYDROCHLORIDE 0.25 MILLIGRAM(S): 2 INJECTION INTRAMUSCULAR; INTRAVENOUS; SUBCUTANEOUS at 07:43

## 2023-01-01 RX ADMIN — ENOXAPARIN SODIUM 65 MILLIGRAM(S): 100 INJECTION SUBCUTANEOUS at 18:13

## 2023-01-01 RX ADMIN — Medication 5 MILLIGRAM(S): at 23:58

## 2023-01-01 RX ADMIN — CHLORHEXIDINE GLUCONATE 1 APPLICATION(S): 213 SOLUTION TOPICAL at 06:06

## 2023-01-01 RX ADMIN — ERTAPENEM SODIUM 120 MILLIGRAM(S): 1 INJECTION, POWDER, LYOPHILIZED, FOR SOLUTION INTRAMUSCULAR; INTRAVENOUS at 19:51

## 2023-01-01 RX ADMIN — MORPHINE 6 MILLIGRAM(S): 10 SOLUTION ORAL at 17:33

## 2023-01-01 RX ADMIN — Medication 5 MILLIGRAM(S): at 06:37

## 2023-01-01 RX ADMIN — Medication 400 MILLIGRAM(S): at 11:39

## 2023-01-01 RX ADMIN — Medication 650 MILLIGRAM(S): at 05:37

## 2023-01-01 RX ADMIN — LIDOCAINE 1 PATCH: 4 CREAM TOPICAL at 07:30

## 2023-01-01 RX ADMIN — SODIUM CHLORIDE 200 MILLILITER(S): 9 INJECTION, SOLUTION INTRAVENOUS at 21:01

## 2023-01-01 RX ADMIN — AMIODARONE HYDROCHLORIDE 16.7 MG/MIN: 400 TABLET ORAL at 22:48

## 2023-01-01 RX ADMIN — HYDROMORPHONE HYDROCHLORIDE 0.25 MILLIGRAM(S): 2 INJECTION INTRAMUSCULAR; INTRAVENOUS; SUBCUTANEOUS at 15:26

## 2023-01-01 RX ADMIN — CASPOFUNGIN ACETATE 260 MILLIGRAM(S): 7 INJECTION, POWDER, LYOPHILIZED, FOR SOLUTION INTRAVENOUS at 22:39

## 2023-01-01 RX ADMIN — SODIUM CHLORIDE 250 MILLILITER(S): 9 INJECTION, SOLUTION INTRAVENOUS at 00:04

## 2023-01-01 RX ADMIN — I.V. FAT EMULSION 20.83 GM/KG/DAY: 20 EMULSION INTRAVENOUS at 21:51

## 2023-01-01 RX ADMIN — LIDOCAINE 1 PATCH: 4 CREAM TOPICAL at 16:30

## 2023-01-01 RX ADMIN — CASPOFUNGIN ACETATE 260 MILLIGRAM(S): 7 INJECTION, POWDER, LYOPHILIZED, FOR SOLUTION INTRAVENOUS at 22:16

## 2023-01-01 RX ADMIN — DAPTOMYCIN 120 MILLIGRAM(S): 500 INJECTION, POWDER, LYOPHILIZED, FOR SOLUTION INTRAVENOUS at 11:21

## 2023-01-01 RX ADMIN — ERTAPENEM SODIUM 120 MILLIGRAM(S): 1 INJECTION, POWDER, LYOPHILIZED, FOR SOLUTION INTRAMUSCULAR; INTRAVENOUS at 08:49

## 2023-01-01 RX ADMIN — HYDROMORPHONE HYDROCHLORIDE 0.5 MILLIGRAM(S): 2 INJECTION INTRAMUSCULAR; INTRAVENOUS; SUBCUTANEOUS at 17:50

## 2023-01-01 RX ADMIN — AMIODARONE HYDROCHLORIDE 16.7 MG/MIN: 400 TABLET ORAL at 00:11

## 2023-01-01 RX ADMIN — ENOXAPARIN SODIUM 60 MILLIGRAM(S): 100 INJECTION SUBCUTANEOUS at 06:31

## 2023-01-01 RX ADMIN — ENOXAPARIN SODIUM 60 MILLIGRAM(S): 100 INJECTION SUBCUTANEOUS at 06:18

## 2023-01-01 RX ADMIN — Medication 5 MILLIGRAM(S): at 12:39

## 2023-01-01 RX ADMIN — Medication 25 MILLIGRAM(S): at 05:39

## 2023-01-01 RX ADMIN — SODIUM CHLORIDE 1000 MILLILITER(S): 9 INJECTION, SOLUTION INTRAVENOUS at 16:10

## 2023-01-01 RX ADMIN — Medication 1 PACKET(S): at 16:03

## 2023-01-01 RX ADMIN — OXYCODONE HYDROCHLORIDE 10 MILLIGRAM(S): 5 TABLET ORAL at 00:07

## 2023-01-01 RX ADMIN — SODIUM CHLORIDE 500 MILLILITER(S): 9 INJECTION, SOLUTION INTRAVENOUS at 21:45

## 2023-01-01 RX ADMIN — ZINC SULFATE TAB 220 MG (50 MG ZINC EQUIVALENT) 220 MILLIGRAM(S): 220 (50 ZN) TAB at 13:02

## 2023-01-01 RX ADMIN — HYDROMORPHONE HYDROCHLORIDE 0.5 MILLIGRAM(S): 2 INJECTION INTRAMUSCULAR; INTRAVENOUS; SUBCUTANEOUS at 17:20

## 2023-01-01 RX ADMIN — OXYCODONE HYDROCHLORIDE 5 MILLIGRAM(S): 5 TABLET ORAL at 09:11

## 2023-01-01 RX ADMIN — MIRTAZAPINE 30 MILLIGRAM(S): 45 TABLET, ORALLY DISINTEGRATING ORAL at 22:36

## 2023-01-01 RX ADMIN — MORPHINE 6 MILLIGRAM(S): 10 SOLUTION ORAL at 06:47

## 2023-01-01 RX ADMIN — SODIUM CHLORIDE 500 MILLILITER(S): 9 INJECTION, SOLUTION INTRAVENOUS at 18:57

## 2023-01-01 RX ADMIN — Medication 1 TABLET(S): at 12:09

## 2023-01-01 RX ADMIN — SODIUM CHLORIDE 100 MILLILITER(S): 9 INJECTION, SOLUTION INTRAVENOUS at 00:21

## 2023-01-01 RX ADMIN — Medication 4 MILLIGRAM(S): at 13:31

## 2023-01-01 RX ADMIN — SODIUM CHLORIDE 500 MILLILITER(S): 9 INJECTION, SOLUTION INTRAVENOUS at 07:21

## 2023-01-01 RX ADMIN — Medication 1 TABLET(S): at 13:00

## 2023-01-01 RX ADMIN — Medication 650 MILLIGRAM(S): at 23:36

## 2023-01-01 RX ADMIN — HYDROMORPHONE HYDROCHLORIDE 0.25 MILLIGRAM(S): 2 INJECTION INTRAMUSCULAR; INTRAVENOUS; SUBCUTANEOUS at 10:45

## 2023-01-01 RX ADMIN — AMIODARONE HYDROCHLORIDE 100 MILLIGRAM(S): 400 TABLET ORAL at 05:37

## 2023-01-01 RX ADMIN — Medication 4 MILLIGRAM(S): at 22:04

## 2023-01-01 RX ADMIN — CHLORHEXIDINE GLUCONATE 1 APPLICATION(S): 213 SOLUTION TOPICAL at 12:46

## 2023-01-01 RX ADMIN — Medication 2 TABLET(S): at 21:35

## 2023-01-01 RX ADMIN — Medication 2 TABLET(S): at 06:51

## 2023-01-01 RX ADMIN — Medication 2 TABLET(S): at 05:53

## 2023-01-01 RX ADMIN — MORPHINE 6 MILLIGRAM(S): 10 SOLUTION ORAL at 18:49

## 2023-01-01 RX ADMIN — OXYCODONE HYDROCHLORIDE 5 MILLIGRAM(S): 5 TABLET ORAL at 11:17

## 2023-01-01 RX ADMIN — PANTOPRAZOLE SODIUM 40 MILLIGRAM(S): 20 TABLET, DELAYED RELEASE ORAL at 05:39

## 2023-01-01 RX ADMIN — I.V. FAT EMULSION 20.83 GM/KG/DAY: 20 EMULSION INTRAVENOUS at 18:23

## 2023-01-01 RX ADMIN — PANTOPRAZOLE SODIUM 40 MILLIGRAM(S): 20 TABLET, DELAYED RELEASE ORAL at 06:59

## 2023-01-01 RX ADMIN — HYDROMORPHONE HYDROCHLORIDE 0.25 MILLIGRAM(S): 2 INJECTION INTRAMUSCULAR; INTRAVENOUS; SUBCUTANEOUS at 19:58

## 2023-01-01 RX ADMIN — Medication 4 MILLIGRAM(S): at 06:29

## 2023-01-01 RX ADMIN — PIPERACILLIN AND TAZOBACTAM 25 GRAM(S): 4; .5 INJECTION, POWDER, LYOPHILIZED, FOR SOLUTION INTRAVENOUS at 07:17

## 2023-01-01 RX ADMIN — ENOXAPARIN SODIUM 65 MILLIGRAM(S): 100 INJECTION SUBCUTANEOUS at 18:11

## 2023-01-01 RX ADMIN — Medication 1000 MILLIGRAM(S): at 11:17

## 2023-01-01 RX ADMIN — I.V. FAT EMULSION 20.8 GM/KG/DAY: 20 EMULSION INTRAVENOUS at 21:18

## 2023-01-01 RX ADMIN — DAPTOMYCIN 120 MILLIGRAM(S): 500 INJECTION, POWDER, LYOPHILIZED, FOR SOLUTION INTRAVENOUS at 11:49

## 2023-01-01 RX ADMIN — Medication 1 EACH: at 19:19

## 2023-01-01 RX ADMIN — Medication 1 TABLET(S): at 11:54

## 2023-01-01 RX ADMIN — HYDROMORPHONE HYDROCHLORIDE 0.5 MILLIGRAM(S): 2 INJECTION INTRAMUSCULAR; INTRAVENOUS; SUBCUTANEOUS at 00:27

## 2023-01-01 RX ADMIN — HYDROMORPHONE HYDROCHLORIDE 0.25 MILLIGRAM(S): 2 INJECTION INTRAMUSCULAR; INTRAVENOUS; SUBCUTANEOUS at 22:40

## 2023-01-01 RX ADMIN — HEPARIN SODIUM 14.5 UNIT(S)/HR: 5000 INJECTION INTRAVENOUS; SUBCUTANEOUS at 19:05

## 2023-01-01 RX ADMIN — HYDROMORPHONE HYDROCHLORIDE 0.5 MILLIGRAM(S): 2 INJECTION INTRAMUSCULAR; INTRAVENOUS; SUBCUTANEOUS at 06:00

## 2023-01-01 RX ADMIN — PANTOPRAZOLE SODIUM 40 MILLIGRAM(S): 20 TABLET, DELAYED RELEASE ORAL at 21:45

## 2023-01-01 RX ADMIN — SODIUM CHLORIDE 1000 MILLILITER(S): 9 INJECTION, SOLUTION INTRAVENOUS at 22:30

## 2023-01-01 RX ADMIN — ERTAPENEM SODIUM 120 MILLIGRAM(S): 1 INJECTION, POWDER, LYOPHILIZED, FOR SOLUTION INTRAMUSCULAR; INTRAVENOUS at 13:19

## 2023-01-01 RX ADMIN — ZINC SULFATE TAB 220 MG (50 MG ZINC EQUIVALENT) 220 MILLIGRAM(S): 220 (50 ZN) TAB at 11:41

## 2023-01-01 RX ADMIN — SODIUM CHLORIDE 550 MILLILITER(S): 9 INJECTION, SOLUTION INTRAVENOUS at 16:15

## 2023-01-01 RX ADMIN — Medication 1 APPLICATION(S): at 22:51

## 2023-01-01 RX ADMIN — MORPHINE 6 MILLIGRAM(S): 10 SOLUTION ORAL at 00:21

## 2023-01-01 RX ADMIN — MEROPENEM 100 MILLIGRAM(S): 1 INJECTION INTRAVENOUS at 19:25

## 2023-01-01 RX ADMIN — DAPTOMYCIN 120 MILLIGRAM(S): 500 INJECTION, POWDER, LYOPHILIZED, FOR SOLUTION INTRAVENOUS at 12:06

## 2023-01-01 RX ADMIN — OXYCODONE HYDROCHLORIDE 5 MILLIGRAM(S): 5 TABLET ORAL at 06:21

## 2023-01-01 RX ADMIN — PANTOPRAZOLE SODIUM 40 MILLIGRAM(S): 20 TABLET, DELAYED RELEASE ORAL at 06:51

## 2023-01-01 RX ADMIN — Medication 400 MILLIGRAM(S): at 19:20

## 2023-01-01 RX ADMIN — Medication 100 GRAM(S): at 14:24

## 2023-01-01 RX ADMIN — AMIODARONE HYDROCHLORIDE 100 MILLIGRAM(S): 400 TABLET ORAL at 06:51

## 2023-01-01 RX ADMIN — PIPERACILLIN AND TAZOBACTAM 25 GRAM(S): 4; .5 INJECTION, POWDER, LYOPHILIZED, FOR SOLUTION INTRAVENOUS at 17:08

## 2023-01-01 RX ADMIN — SODIUM CHLORIDE 1000 MILLILITER(S): 9 INJECTION, SOLUTION INTRAVENOUS at 17:01

## 2023-01-01 RX ADMIN — ENOXAPARIN SODIUM 60 MILLIGRAM(S): 100 INJECTION SUBCUTANEOUS at 18:25

## 2023-01-01 RX ADMIN — Medication 1 PACKET(S): at 14:30

## 2023-01-01 RX ADMIN — Medication 1 PACKET(S): at 06:22

## 2023-01-01 RX ADMIN — SODIUM CHLORIDE 100 MILLILITER(S): 9 INJECTION, SOLUTION INTRAVENOUS at 18:50

## 2023-01-01 RX ADMIN — SODIUM CHLORIDE 250 MILLILITER(S): 9 INJECTION, SOLUTION INTRAVENOUS at 07:00

## 2023-01-01 RX ADMIN — PIPERACILLIN AND TAZOBACTAM 25 GRAM(S): 4; .5 INJECTION, POWDER, LYOPHILIZED, FOR SOLUTION INTRAVENOUS at 23:51

## 2023-01-01 RX ADMIN — Medication 4 MILLIGRAM(S): at 14:38

## 2023-01-01 RX ADMIN — Medication 400 MILLIGRAM(S): at 00:27

## 2023-01-01 RX ADMIN — Medication 1 TABLET(S): at 13:22

## 2023-01-01 RX ADMIN — Medication 1 TABLET(S): at 13:09

## 2023-01-01 RX ADMIN — Medication 25 MILLIGRAM(S): at 06:29

## 2023-01-01 RX ADMIN — SODIUM CHLORIDE 500 MILLILITER(S): 9 INJECTION, SOLUTION INTRAVENOUS at 12:17

## 2023-01-01 RX ADMIN — Medication 5 MILLIGRAM(S): at 17:59

## 2023-01-01 RX ADMIN — MEROPENEM 100 MILLIGRAM(S): 1 INJECTION INTRAVENOUS at 11:30

## 2023-01-01 RX ADMIN — HYDROMORPHONE HYDROCHLORIDE 0.5 MILLIGRAM(S): 2 INJECTION INTRAMUSCULAR; INTRAVENOUS; SUBCUTANEOUS at 02:46

## 2023-01-01 RX ADMIN — Medication 4 MILLIGRAM(S): at 12:22

## 2023-01-01 RX ADMIN — AMIODARONE HYDROCHLORIDE 100 MILLIGRAM(S): 400 TABLET ORAL at 06:28

## 2023-01-01 RX ADMIN — MORPHINE 6 MILLIGRAM(S): 10 SOLUTION ORAL at 06:43

## 2023-01-01 RX ADMIN — Medication 1 EACH: at 18:36

## 2023-01-01 RX ADMIN — CHLORHEXIDINE GLUCONATE 1 APPLICATION(S): 213 SOLUTION TOPICAL at 11:51

## 2023-01-01 RX ADMIN — SODIUM CHLORIDE 1300 MILLILITER(S): 9 INJECTION, SOLUTION INTRAVENOUS at 03:42

## 2023-01-01 RX ADMIN — HYDROMORPHONE HYDROCHLORIDE 0.5 MILLIGRAM(S): 2 INJECTION INTRAMUSCULAR; INTRAVENOUS; SUBCUTANEOUS at 06:23

## 2023-01-01 RX ADMIN — Medication 4 MILLIGRAM(S): at 22:30

## 2023-01-01 RX ADMIN — ENOXAPARIN SODIUM 60 MILLIGRAM(S): 100 INJECTION SUBCUTANEOUS at 13:30

## 2023-01-01 RX ADMIN — AMIODARONE HYDROCHLORIDE 400 MILLIGRAM(S): 400 TABLET ORAL at 00:38

## 2023-01-01 RX ADMIN — Medication 25 GRAM(S): at 06:33

## 2023-01-01 RX ADMIN — ERTAPENEM SODIUM 120 MILLIGRAM(S): 1 INJECTION, POWDER, LYOPHILIZED, FOR SOLUTION INTRAMUSCULAR; INTRAVENOUS at 11:32

## 2023-01-01 RX ADMIN — Medication 4 MILLIGRAM(S): at 14:46

## 2023-01-01 RX ADMIN — Medication 5 MILLIGRAM(S): at 01:02

## 2023-01-01 RX ADMIN — Medication 4 MILLIGRAM(S): at 23:11

## 2023-01-01 RX ADMIN — Medication 1 PACKET(S): at 14:46

## 2023-01-01 RX ADMIN — Medication 2 TABLET(S): at 21:28

## 2023-01-01 RX ADMIN — Medication 1 PACKET(S): at 22:01

## 2023-01-01 RX ADMIN — Medication 1 PACKET(S): at 14:02

## 2023-01-01 RX ADMIN — CHLORHEXIDINE GLUCONATE 1 APPLICATION(S): 213 SOLUTION TOPICAL at 11:53

## 2023-01-01 RX ADMIN — MEROPENEM 100 MILLIGRAM(S): 1 INJECTION INTRAVENOUS at 09:24

## 2023-01-01 RX ADMIN — Medication 25 MILLIGRAM(S): at 06:31

## 2023-01-01 RX ADMIN — PIPERACILLIN AND TAZOBACTAM 25 GRAM(S): 4; .5 INJECTION, POWDER, LYOPHILIZED, FOR SOLUTION INTRAVENOUS at 23:37

## 2023-01-01 RX ADMIN — Medication 250 MILLIGRAM(S): at 07:00

## 2023-01-01 RX ADMIN — Medication 4 MILLIGRAM(S): at 13:40

## 2023-01-01 RX ADMIN — Medication 1 APPLICATION(S): at 13:01

## 2023-01-01 RX ADMIN — MIRTAZAPINE 30 MILLIGRAM(S): 45 TABLET, ORALLY DISINTEGRATING ORAL at 22:43

## 2023-01-01 RX ADMIN — Medication 5 MILLIGRAM(S): at 18:00

## 2023-01-01 RX ADMIN — PANTOPRAZOLE SODIUM 40 MILLIGRAM(S): 20 TABLET, DELAYED RELEASE ORAL at 17:59

## 2023-01-01 RX ADMIN — Medication 2 TABLET(S): at 13:55

## 2023-01-01 RX ADMIN — SODIUM CHLORIDE 10 MILLILITER(S): 9 INJECTION INTRAMUSCULAR; INTRAVENOUS; SUBCUTANEOUS at 22:24

## 2023-01-01 RX ADMIN — CHLORHEXIDINE GLUCONATE 1 APPLICATION(S): 213 SOLUTION TOPICAL at 05:21

## 2023-01-01 RX ADMIN — OXYCODONE HYDROCHLORIDE 5 MILLIGRAM(S): 5 TABLET ORAL at 04:57

## 2023-01-01 RX ADMIN — PIPERACILLIN AND TAZOBACTAM 25 GRAM(S): 4; .5 INJECTION, POWDER, LYOPHILIZED, FOR SOLUTION INTRAVENOUS at 07:20

## 2023-01-01 RX ADMIN — NYSTATIN CREAM 1 APPLICATION(S): 100000 CREAM TOPICAL at 18:03

## 2023-01-01 RX ADMIN — Medication 1000 MILLIGRAM(S): at 11:19

## 2023-01-01 RX ADMIN — HYDROMORPHONE HYDROCHLORIDE 0.25 MILLIGRAM(S): 2 INJECTION INTRAMUSCULAR; INTRAVENOUS; SUBCUTANEOUS at 22:50

## 2023-01-01 RX ADMIN — Medication 650 MILLIGRAM(S): at 22:00

## 2023-01-01 RX ADMIN — NYSTATIN CREAM 1 APPLICATION(S): 100000 CREAM TOPICAL at 19:14

## 2023-01-01 RX ADMIN — ZINC SULFATE TAB 220 MG (50 MG ZINC EQUIVALENT) 220 MILLIGRAM(S): 220 (50 ZN) TAB at 13:22

## 2023-01-01 RX ADMIN — PANTOPRAZOLE SODIUM 40 MILLIGRAM(S): 20 TABLET, DELAYED RELEASE ORAL at 17:27

## 2023-01-01 RX ADMIN — SODIUM CHLORIDE 50 MILLILITER(S): 9 INJECTION, SOLUTION INTRAVENOUS at 07:20

## 2023-01-01 RX ADMIN — Medication 5 MILLIGRAM(S): at 06:00

## 2023-01-01 RX ADMIN — AMIODARONE HYDROCHLORIDE 16.7 MG/MIN: 400 TABLET ORAL at 22:28

## 2023-01-01 RX ADMIN — LIDOCAINE 1 PATCH: 4 CREAM TOPICAL at 18:04

## 2023-01-01 RX ADMIN — HEPARIN SODIUM 16 UNIT(S)/HR: 5000 INJECTION INTRAVENOUS; SUBCUTANEOUS at 16:30

## 2023-01-01 RX ADMIN — Medication 650 MILLIGRAM(S): at 18:00

## 2023-01-01 RX ADMIN — SODIUM CHLORIDE 10 MILLILITER(S): 9 INJECTION INTRAMUSCULAR; INTRAVENOUS; SUBCUTANEOUS at 13:25

## 2023-01-01 RX ADMIN — MORPHINE 6 MILLIGRAM(S): 10 SOLUTION ORAL at 05:40

## 2023-01-01 RX ADMIN — SODIUM CHLORIDE 125 MILLILITER(S): 9 INJECTION, SOLUTION INTRAVENOUS at 11:54

## 2023-01-01 RX ADMIN — SODIUM CHLORIDE 500 MILLILITER(S): 9 INJECTION INTRAMUSCULAR; INTRAVENOUS; SUBCUTANEOUS at 00:43

## 2023-01-01 RX ADMIN — Medication 63 EACH: at 17:30

## 2023-01-01 RX ADMIN — SODIUM CHLORIDE 10 MILLILITER(S): 9 INJECTION INTRAMUSCULAR; INTRAVENOUS; SUBCUTANEOUS at 15:49

## 2023-01-01 RX ADMIN — PIPERACILLIN AND TAZOBACTAM 25 GRAM(S): 4; .5 INJECTION, POWDER, LYOPHILIZED, FOR SOLUTION INTRAVENOUS at 23:39

## 2023-01-01 RX ADMIN — OXYCODONE HYDROCHLORIDE 5 MILLIGRAM(S): 5 TABLET ORAL at 14:03

## 2023-01-01 RX ADMIN — Medication 1 TABLET(S): at 11:11

## 2023-01-01 RX ADMIN — Medication 5 MILLIGRAM(S): at 06:53

## 2023-01-01 RX ADMIN — HEPARIN SODIUM 13 UNIT(S)/HR: 5000 INJECTION INTRAVENOUS; SUBCUTANEOUS at 15:30

## 2023-01-01 RX ADMIN — Medication 40 MILLIEQUIVALENT(S): at 08:37

## 2023-01-01 RX ADMIN — Medication 5 MILLIGRAM(S): at 11:12

## 2023-01-01 RX ADMIN — HYDROMORPHONE HYDROCHLORIDE 0.5 MILLIGRAM(S): 2 INJECTION INTRAMUSCULAR; INTRAVENOUS; SUBCUTANEOUS at 05:58

## 2023-01-01 RX ADMIN — Medication 1 PACKET(S): at 14:26

## 2023-01-01 RX ADMIN — Medication 1 EACH: at 18:14

## 2023-01-01 RX ADMIN — SODIUM CHLORIDE 500 MILLILITER(S): 9 INJECTION, SOLUTION INTRAVENOUS at 06:38

## 2023-01-01 RX ADMIN — Medication 1 PACKET(S): at 15:13

## 2023-01-01 RX ADMIN — HEPARIN SODIUM 5000 UNIT(S): 5000 INJECTION INTRAVENOUS; SUBCUTANEOUS at 05:06

## 2023-01-01 RX ADMIN — MORPHINE 6 MILLIGRAM(S): 10 SOLUTION ORAL at 05:22

## 2023-01-01 RX ADMIN — NYSTATIN CREAM 1 APPLICATION(S): 100000 CREAM TOPICAL at 21:58

## 2023-01-01 RX ADMIN — PANTOPRAZOLE SODIUM 40 MILLIGRAM(S): 20 TABLET, DELAYED RELEASE ORAL at 17:10

## 2023-01-01 RX ADMIN — SACUBITRIL AND VALSARTAN 1 TABLET(S): 24; 26 TABLET, FILM COATED ORAL at 19:07

## 2023-01-01 RX ADMIN — Medication 400 MILLIGRAM(S): at 19:00

## 2023-01-01 RX ADMIN — PANTOPRAZOLE SODIUM 40 MILLIGRAM(S): 20 TABLET, DELAYED RELEASE ORAL at 18:49

## 2023-01-01 RX ADMIN — Medication 650 MILLIGRAM(S): at 07:26

## 2023-01-01 RX ADMIN — HYDROMORPHONE HYDROCHLORIDE 0.5 MILLIGRAM(S): 2 INJECTION INTRAMUSCULAR; INTRAVENOUS; SUBCUTANEOUS at 11:00

## 2023-01-01 RX ADMIN — HYDROMORPHONE HYDROCHLORIDE 0.5 MILLIGRAM(S): 2 INJECTION INTRAMUSCULAR; INTRAVENOUS; SUBCUTANEOUS at 07:55

## 2023-01-01 RX ADMIN — SODIUM CHLORIDE 100 MILLILITER(S): 9 INJECTION, SOLUTION INTRAVENOUS at 18:20

## 2023-01-01 RX ADMIN — MIRTAZAPINE 30 MILLIGRAM(S): 45 TABLET, ORALLY DISINTEGRATING ORAL at 22:26

## 2023-01-01 RX ADMIN — ENOXAPARIN SODIUM 60 MILLIGRAM(S): 100 INJECTION SUBCUTANEOUS at 18:22

## 2023-01-01 RX ADMIN — Medication 1 APPLICATION(S): at 11:40

## 2023-01-01 RX ADMIN — Medication 25 MILLIGRAM(S): at 05:51

## 2023-01-01 RX ADMIN — DAPTOMYCIN 120 MILLIGRAM(S): 500 INJECTION, POWDER, LYOPHILIZED, FOR SOLUTION INTRAVENOUS at 12:15

## 2023-01-01 RX ADMIN — Medication 1 TABLET(S): at 14:01

## 2023-01-01 RX ADMIN — Medication 5 MILLIGRAM(S): at 23:27

## 2023-01-01 RX ADMIN — Medication 2 TABLET(S): at 21:53

## 2023-01-01 RX ADMIN — I.V. FAT EMULSION 20.8 GM/KG/DAY: 20 EMULSION INTRAVENOUS at 21:39

## 2023-01-01 RX ADMIN — MORPHINE 6 MILLIGRAM(S): 10 SOLUTION ORAL at 18:14

## 2023-01-01 RX ADMIN — Medication 4 MILLIGRAM(S): at 15:33

## 2023-01-01 RX ADMIN — SODIUM CHLORIDE 100 MILLILITER(S): 9 INJECTION, SOLUTION INTRAVENOUS at 00:46

## 2023-01-01 RX ADMIN — ONDANSETRON 4 MILLIGRAM(S): 8 TABLET, FILM COATED ORAL at 09:17

## 2023-01-01 RX ADMIN — Medication 400 MILLIGRAM(S): at 21:44

## 2023-01-01 RX ADMIN — I.V. FAT EMULSION 20.83 GM/KG/DAY: 20 EMULSION INTRAVENOUS at 17:27

## 2023-01-01 RX ADMIN — SODIUM CHLORIDE 10 MILLILITER(S): 9 INJECTION INTRAMUSCULAR; INTRAVENOUS; SUBCUTANEOUS at 22:29

## 2023-01-01 RX ADMIN — HYDROMORPHONE HYDROCHLORIDE 0.25 MILLIGRAM(S): 2 INJECTION INTRAMUSCULAR; INTRAVENOUS; SUBCUTANEOUS at 08:30

## 2023-01-01 RX ADMIN — Medication 975 MILLIGRAM(S): at 10:33

## 2023-01-01 RX ADMIN — Medication 25 MILLIGRAM(S): at 05:37

## 2023-01-01 RX ADMIN — Medication 25 GRAM(S): at 12:20

## 2023-01-01 RX ADMIN — SODIUM CHLORIDE 500 MILLILITER(S): 9 INJECTION INTRAMUSCULAR; INTRAVENOUS; SUBCUTANEOUS at 01:15

## 2023-01-01 RX ADMIN — ENOXAPARIN SODIUM 65 MILLIGRAM(S): 100 INJECTION SUBCUTANEOUS at 17:44

## 2023-01-01 RX ADMIN — Medication 2: at 12:41

## 2023-01-01 RX ADMIN — Medication 650 MILLIGRAM(S): at 21:01

## 2023-01-01 RX ADMIN — Medication 5 MILLIGRAM(S): at 06:27

## 2023-01-01 RX ADMIN — TIGECYCLINE 105 MILLIGRAM(S): 50 INJECTION, POWDER, LYOPHILIZED, FOR SOLUTION INTRAVENOUS at 17:06

## 2023-01-01 RX ADMIN — AMIODARONE HYDROCHLORIDE 16.7 MG/MIN: 400 TABLET ORAL at 09:09

## 2023-01-01 RX ADMIN — PIPERACILLIN AND TAZOBACTAM 25 GRAM(S): 4; .5 INJECTION, POWDER, LYOPHILIZED, FOR SOLUTION INTRAVENOUS at 07:29

## 2023-01-01 RX ADMIN — Medication 2: at 05:57

## 2023-01-01 RX ADMIN — Medication 25 MILLIGRAM(S): at 05:54

## 2023-01-01 RX ADMIN — SODIUM CHLORIDE 1000 MILLILITER(S): 9 INJECTION INTRAMUSCULAR; INTRAVENOUS; SUBCUTANEOUS at 05:56

## 2023-01-01 RX ADMIN — Medication 2 TABLET(S): at 06:43

## 2023-01-01 RX ADMIN — Medication 2: at 06:35

## 2023-01-01 RX ADMIN — ONDANSETRON 4 MILLIGRAM(S): 8 TABLET, FILM COATED ORAL at 23:14

## 2023-01-01 RX ADMIN — OXYCODONE HYDROCHLORIDE 5 MILLIGRAM(S): 5 TABLET ORAL at 10:24

## 2023-01-01 RX ADMIN — OCTREOTIDE ACETATE 100 MICROGRAM(S): 200 INJECTION, SOLUTION INTRAVENOUS; SUBCUTANEOUS at 09:46

## 2023-01-01 RX ADMIN — HYDROMORPHONE HYDROCHLORIDE 0.25 MILLIGRAM(S): 2 INJECTION INTRAMUSCULAR; INTRAVENOUS; SUBCUTANEOUS at 11:03

## 2023-01-01 RX ADMIN — AMIODARONE HYDROCHLORIDE 100 MILLIGRAM(S): 400 TABLET ORAL at 05:38

## 2023-01-01 RX ADMIN — Medication 1 TABLET(S): at 12:26

## 2023-01-01 RX ADMIN — SODIUM CHLORIDE 600 MILLILITER(S): 9 INJECTION, SOLUTION INTRAVENOUS at 09:15

## 2023-01-01 RX ADMIN — PIPERACILLIN AND TAZOBACTAM 25 GRAM(S): 4; .5 INJECTION, POWDER, LYOPHILIZED, FOR SOLUTION INTRAVENOUS at 00:21

## 2023-01-01 RX ADMIN — MORPHINE 6 MILLIGRAM(S): 10 SOLUTION ORAL at 16:20

## 2023-01-01 RX ADMIN — Medication 2 TABLET(S): at 06:35

## 2023-01-01 RX ADMIN — Medication 4 MILLIGRAM(S): at 02:19

## 2023-01-01 RX ADMIN — Medication 4 MILLIGRAM(S): at 14:44

## 2023-01-01 RX ADMIN — CHLORHEXIDINE GLUCONATE 1 APPLICATION(S): 213 SOLUTION TOPICAL at 04:33

## 2023-01-01 RX ADMIN — Medication 650 MILLIGRAM(S): at 19:37

## 2023-01-01 RX ADMIN — I.V. FAT EMULSION 20.8 GM/KG/DAY: 20 EMULSION INTRAVENOUS at 21:35

## 2023-01-01 RX ADMIN — Medication 4 MILLIGRAM(S): at 14:18

## 2023-01-01 RX ADMIN — SPIRONOLACTONE 25 MILLIGRAM(S): 25 TABLET, FILM COATED ORAL at 06:00

## 2023-01-01 RX ADMIN — Medication 5 MILLIGRAM(S): at 17:32

## 2023-01-01 RX ADMIN — Medication 650 MILLIGRAM(S): at 22:38

## 2023-01-01 RX ADMIN — SACUBITRIL AND VALSARTAN 1 TABLET(S): 24; 26 TABLET, FILM COATED ORAL at 18:01

## 2023-01-01 RX ADMIN — OXYCODONE HYDROCHLORIDE 5 MILLIGRAM(S): 5 TABLET ORAL at 07:44

## 2023-01-01 RX ADMIN — HYDROMORPHONE HYDROCHLORIDE 0.25 MILLIGRAM(S): 2 INJECTION INTRAMUSCULAR; INTRAVENOUS; SUBCUTANEOUS at 08:36

## 2023-01-01 RX ADMIN — SODIUM CHLORIDE 500 MILLILITER(S): 9 INJECTION, SOLUTION INTRAVENOUS at 21:30

## 2023-01-01 RX ADMIN — PIPERACILLIN AND TAZOBACTAM 25 GRAM(S): 4; .5 INJECTION, POWDER, LYOPHILIZED, FOR SOLUTION INTRAVENOUS at 23:47

## 2023-01-01 RX ADMIN — Medication 1 TABLET(S): at 13:42

## 2023-01-01 RX ADMIN — PANTOPRAZOLE SODIUM 40 MILLIGRAM(S): 20 TABLET, DELAYED RELEASE ORAL at 09:13

## 2023-01-01 RX ADMIN — ZINC SULFATE TAB 220 MG (50 MG ZINC EQUIVALENT) 220 MILLIGRAM(S): 220 (50 ZN) TAB at 11:08

## 2023-01-01 RX ADMIN — Medication 650 MILLIGRAM(S): at 22:01

## 2023-01-01 RX ADMIN — ENOXAPARIN SODIUM 65 MILLIGRAM(S): 100 INJECTION SUBCUTANEOUS at 17:58

## 2023-01-01 RX ADMIN — DAPTOMYCIN 120 MILLIGRAM(S): 500 INJECTION, POWDER, LYOPHILIZED, FOR SOLUTION INTRAVENOUS at 06:45

## 2023-01-01 RX ADMIN — AMIODARONE HYDROCHLORIDE 100 MILLIGRAM(S): 400 TABLET ORAL at 14:37

## 2023-01-01 RX ADMIN — Medication 650 MILLIGRAM(S): at 21:09

## 2023-01-01 RX ADMIN — MORPHINE 6 MILLIGRAM(S): 10 SOLUTION ORAL at 18:46

## 2023-01-01 RX ADMIN — Medication 5 MILLIGRAM(S): at 11:16

## 2023-01-01 RX ADMIN — Medication 5 MILLIGRAM(S): at 06:26

## 2023-01-01 RX ADMIN — PANTOPRAZOLE SODIUM 40 MILLIGRAM(S): 20 TABLET, DELAYED RELEASE ORAL at 22:28

## 2023-01-01 RX ADMIN — HYDROMORPHONE HYDROCHLORIDE 0.5 MILLIGRAM(S): 2 INJECTION INTRAMUSCULAR; INTRAVENOUS; SUBCUTANEOUS at 12:27

## 2023-01-01 RX ADMIN — Medication 100 GRAM(S): at 08:54

## 2023-01-01 RX ADMIN — Medication 5 MILLIGRAM(S): at 11:41

## 2023-01-01 RX ADMIN — Medication 650 MILLIGRAM(S): at 05:48

## 2023-01-01 RX ADMIN — Medication 1 PACKET(S): at 22:21

## 2023-01-01 RX ADMIN — Medication 4 MILLIGRAM(S): at 05:39

## 2023-01-01 RX ADMIN — HYDROMORPHONE HYDROCHLORIDE 0.5 MILLIGRAM(S): 2 INJECTION INTRAMUSCULAR; INTRAVENOUS; SUBCUTANEOUS at 18:49

## 2023-01-01 RX ADMIN — Medication 1 EACH: at 18:45

## 2023-01-01 RX ADMIN — Medication 2 TABLET(S): at 07:26

## 2023-01-01 RX ADMIN — ENOXAPARIN SODIUM 60 MILLIGRAM(S): 100 INJECTION SUBCUTANEOUS at 17:53

## 2023-01-01 RX ADMIN — MIRTAZAPINE 30 MILLIGRAM(S): 45 TABLET, ORALLY DISINTEGRATING ORAL at 23:49

## 2023-01-01 RX ADMIN — PANTOPRAZOLE SODIUM 40 MILLIGRAM(S): 20 TABLET, DELAYED RELEASE ORAL at 21:26

## 2023-01-01 RX ADMIN — Medication 1000 MILLIGRAM(S): at 15:39

## 2023-01-01 RX ADMIN — HYDROMORPHONE HYDROCHLORIDE 0.5 MILLIGRAM(S): 2 INJECTION INTRAMUSCULAR; INTRAVENOUS; SUBCUTANEOUS at 12:25

## 2023-01-01 RX ADMIN — HYDROMORPHONE HYDROCHLORIDE 0.5 MILLIGRAM(S): 2 INJECTION INTRAMUSCULAR; INTRAVENOUS; SUBCUTANEOUS at 22:51

## 2023-01-01 RX ADMIN — MORPHINE 6 MILLIGRAM(S): 10 SOLUTION ORAL at 14:23

## 2023-01-01 RX ADMIN — HYDROMORPHONE HYDROCHLORIDE 0.5 MILLIGRAM(S): 2 INJECTION INTRAMUSCULAR; INTRAVENOUS; SUBCUTANEOUS at 18:04

## 2023-01-01 RX ADMIN — Medication 500 MILLIGRAM(S): at 13:41

## 2023-01-01 RX ADMIN — SODIUM CHLORIDE 10 MILLILITER(S): 9 INJECTION INTRAMUSCULAR; INTRAVENOUS; SUBCUTANEOUS at 06:18

## 2023-01-01 RX ADMIN — Medication 400 MILLIGRAM(S): at 10:34

## 2023-01-01 RX ADMIN — MIRTAZAPINE 30 MILLIGRAM(S): 45 TABLET, ORALLY DISINTEGRATING ORAL at 22:05

## 2023-01-01 RX ADMIN — SPIRONOLACTONE 25 MILLIGRAM(S): 25 TABLET, FILM COATED ORAL at 06:45

## 2023-01-01 RX ADMIN — HYDROMORPHONE HYDROCHLORIDE 0.25 MILLIGRAM(S): 2 INJECTION INTRAMUSCULAR; INTRAVENOUS; SUBCUTANEOUS at 03:27

## 2023-01-01 RX ADMIN — HYDROMORPHONE HYDROCHLORIDE 0.25 MILLIGRAM(S): 2 INJECTION INTRAMUSCULAR; INTRAVENOUS; SUBCUTANEOUS at 21:30

## 2023-01-01 RX ADMIN — Medication 1000 MILLIGRAM(S): at 01:00

## 2023-01-01 RX ADMIN — CHLORHEXIDINE GLUCONATE 1 APPLICATION(S): 213 SOLUTION TOPICAL at 05:11

## 2023-01-01 RX ADMIN — CHLORHEXIDINE GLUCONATE 1 APPLICATION(S): 213 SOLUTION TOPICAL at 10:59

## 2023-01-01 RX ADMIN — SPIRONOLACTONE 25 MILLIGRAM(S): 25 TABLET, FILM COATED ORAL at 06:29

## 2023-01-01 RX ADMIN — Medication 4 MILLIGRAM(S): at 06:02

## 2023-01-01 RX ADMIN — MORPHINE 6 MILLIGRAM(S): 10 SOLUTION ORAL at 17:05

## 2023-01-01 RX ADMIN — Medication 4 MILLIGRAM(S): at 06:18

## 2023-01-01 RX ADMIN — MORPHINE 6 MILLIGRAM(S): 10 SOLUTION ORAL at 12:43

## 2023-01-01 RX ADMIN — Medication 500 MILLIGRAM(S): at 11:12

## 2023-01-01 RX ADMIN — Medication 5 MILLIGRAM(S): at 01:36

## 2023-01-01 RX ADMIN — SODIUM CHLORIDE 866.67 MILLILITER(S): 9 INJECTION, SOLUTION INTRAVENOUS at 07:37

## 2023-01-01 RX ADMIN — ZINC SULFATE TAB 220 MG (50 MG ZINC EQUIVALENT) 220 MILLIGRAM(S): 220 (50 ZN) TAB at 12:43

## 2023-01-01 RX ADMIN — PANTOPRAZOLE SODIUM 40 MILLIGRAM(S): 20 TABLET, DELAYED RELEASE ORAL at 09:33

## 2023-01-01 RX ADMIN — SODIUM CHLORIDE 1000 MILLILITER(S): 9 INJECTION, SOLUTION INTRAVENOUS at 05:42

## 2023-01-01 RX ADMIN — SODIUM CHLORIDE 400 MILLILITER(S): 9 INJECTION, SOLUTION INTRAVENOUS at 03:18

## 2023-01-01 RX ADMIN — CHLORHEXIDINE GLUCONATE 1 APPLICATION(S): 213 SOLUTION TOPICAL at 11:21

## 2023-01-01 RX ADMIN — Medication 1000 MILLIGRAM(S): at 12:05

## 2023-01-01 RX ADMIN — Medication 1 EACH: at 17:09

## 2023-01-01 RX ADMIN — HYDROMORPHONE HYDROCHLORIDE 0.25 MILLIGRAM(S): 2 INJECTION INTRAMUSCULAR; INTRAVENOUS; SUBCUTANEOUS at 13:04

## 2023-01-01 RX ADMIN — PANTOPRAZOLE SODIUM 40 MILLIGRAM(S): 20 TABLET, DELAYED RELEASE ORAL at 11:17

## 2023-01-01 RX ADMIN — Medication 5 MILLIGRAM(S): at 05:29

## 2023-01-01 RX ADMIN — Medication 650 MILLIGRAM(S): at 04:22

## 2023-01-01 RX ADMIN — Medication 1 PACKET(S): at 22:16

## 2023-01-01 RX ADMIN — Medication 4 MILLIGRAM(S): at 22:49

## 2023-01-01 RX ADMIN — Medication 500 MILLIGRAM(S): at 12:03

## 2023-01-01 RX ADMIN — MORPHINE 6 MILLIGRAM(S): 10 SOLUTION ORAL at 22:07

## 2023-01-01 RX ADMIN — ENOXAPARIN SODIUM 60 MILLIGRAM(S): 100 INJECTION SUBCUTANEOUS at 12:59

## 2023-01-01 RX ADMIN — PANTOPRAZOLE SODIUM 40 MILLIGRAM(S): 20 TABLET, DELAYED RELEASE ORAL at 05:54

## 2023-01-01 RX ADMIN — SODIUM CHLORIDE 10 MILLILITER(S): 9 INJECTION INTRAMUSCULAR; INTRAVENOUS; SUBCUTANEOUS at 16:19

## 2023-01-01 RX ADMIN — MORPHINE 6 MILLIGRAM(S): 10 SOLUTION ORAL at 17:16

## 2023-01-01 RX ADMIN — ENOXAPARIN SODIUM 60 MILLIGRAM(S): 100 INJECTION SUBCUTANEOUS at 18:27

## 2023-01-01 RX ADMIN — SODIUM CHLORIDE 300 MILLILITER(S): 9 INJECTION, SOLUTION INTRAVENOUS at 14:49

## 2023-01-01 RX ADMIN — Medication 2 TABLET(S): at 07:20

## 2023-01-01 RX ADMIN — SODIUM CHLORIDE 10 MILLILITER(S): 9 INJECTION INTRAMUSCULAR; INTRAVENOUS; SUBCUTANEOUS at 05:38

## 2023-01-01 RX ADMIN — CHOLESTYRAMINE 2 GRAM(S): 4 POWDER, FOR SUSPENSION ORAL at 21:23

## 2023-01-01 RX ADMIN — Medication 2 TABLET(S): at 14:30

## 2023-01-01 RX ADMIN — Medication 2 TABLET(S): at 05:40

## 2023-01-01 RX ADMIN — ENOXAPARIN SODIUM 60 MILLIGRAM(S): 100 INJECTION SUBCUTANEOUS at 22:17

## 2023-01-01 RX ADMIN — Medication 2 TABLET(S): at 13:42

## 2023-01-01 RX ADMIN — Medication 63 EACH: at 17:40

## 2023-01-01 RX ADMIN — CHOLESTYRAMINE 2 GRAM(S): 4 POWDER, FOR SUSPENSION ORAL at 22:43

## 2023-01-01 RX ADMIN — SODIUM CHLORIDE 10 MILLILITER(S): 9 INJECTION INTRAMUSCULAR; INTRAVENOUS; SUBCUTANEOUS at 06:32

## 2023-01-01 RX ADMIN — Medication 4 MILLIGRAM(S): at 21:55

## 2023-01-01 RX ADMIN — ENOXAPARIN SODIUM 65 MILLIGRAM(S): 100 INJECTION SUBCUTANEOUS at 17:23

## 2023-01-01 RX ADMIN — I.V. FAT EMULSION 20.8 GM/KG/DAY: 20 EMULSION INTRAVENOUS at 21:58

## 2023-01-01 RX ADMIN — Medication 500 MILLIGRAM(S): at 12:10

## 2023-01-01 RX ADMIN — HEPARIN SODIUM 14 UNIT(S)/HR: 5000 INJECTION INTRAVENOUS; SUBCUTANEOUS at 06:55

## 2023-01-01 RX ADMIN — OXYCODONE HYDROCHLORIDE 5 MILLIGRAM(S): 5 TABLET ORAL at 03:45

## 2023-01-01 RX ADMIN — ZINC SULFATE TAB 220 MG (50 MG ZINC EQUIVALENT) 220 MILLIGRAM(S): 220 (50 ZN) TAB at 12:26

## 2023-01-01 RX ADMIN — CHLORHEXIDINE GLUCONATE 1 APPLICATION(S): 213 SOLUTION TOPICAL at 06:59

## 2023-01-01 RX ADMIN — CHLORHEXIDINE GLUCONATE 1 APPLICATION(S): 213 SOLUTION TOPICAL at 12:15

## 2023-01-01 RX ADMIN — Medication 1 TABLET(S): at 13:33

## 2023-01-01 RX ADMIN — ZINC SULFATE TAB 220 MG (50 MG ZINC EQUIVALENT) 220 MILLIGRAM(S): 220 (50 ZN) TAB at 12:40

## 2023-01-01 RX ADMIN — Medication 1 PACKET(S): at 06:32

## 2023-01-01 RX ADMIN — PANTOPRAZOLE SODIUM 40 MILLIGRAM(S): 20 TABLET, DELAYED RELEASE ORAL at 18:08

## 2023-01-01 RX ADMIN — PANTOPRAZOLE SODIUM 40 MILLIGRAM(S): 20 TABLET, DELAYED RELEASE ORAL at 06:30

## 2023-01-01 RX ADMIN — MORPHINE 6 MILLIGRAM(S): 10 SOLUTION ORAL at 23:47

## 2023-01-01 RX ADMIN — SODIUM CHLORIDE 1000 MILLILITER(S): 9 INJECTION, SOLUTION INTRAVENOUS at 09:23

## 2023-01-01 RX ADMIN — Medication 1000 MILLIGRAM(S): at 23:13

## 2023-01-01 RX ADMIN — Medication 5 MILLIGRAM(S): at 06:22

## 2023-01-01 RX ADMIN — PANTOPRAZOLE SODIUM 40 MILLIGRAM(S): 20 TABLET, DELAYED RELEASE ORAL at 18:57

## 2023-01-01 RX ADMIN — SODIUM CHLORIDE 10 MILLILITER(S): 9 INJECTION INTRAMUSCULAR; INTRAVENOUS; SUBCUTANEOUS at 05:03

## 2023-01-01 RX ADMIN — Medication 2 TABLET(S): at 15:12

## 2023-01-01 RX ADMIN — Medication 1 TABLET(S): at 11:05

## 2023-01-01 RX ADMIN — PANTOPRAZOLE SODIUM 40 MILLIGRAM(S): 20 TABLET, DELAYED RELEASE ORAL at 06:31

## 2023-01-01 RX ADMIN — Medication 25 MILLIGRAM(S): at 07:20

## 2023-01-01 RX ADMIN — Medication 400 MILLIGRAM(S): at 16:04

## 2023-01-01 RX ADMIN — ZINC SULFATE TAB 220 MG (50 MG ZINC EQUIVALENT) 220 MILLIGRAM(S): 220 (50 ZN) TAB at 12:55

## 2023-01-01 RX ADMIN — PANTOPRAZOLE SODIUM 40 MILLIGRAM(S): 20 TABLET, DELAYED RELEASE ORAL at 05:40

## 2023-01-01 RX ADMIN — CHLORHEXIDINE GLUCONATE 1 APPLICATION(S): 213 SOLUTION TOPICAL at 06:45

## 2023-01-01 RX ADMIN — Medication 4 MILLIGRAM(S): at 05:12

## 2023-01-01 RX ADMIN — OXYCODONE HYDROCHLORIDE 5 MILLIGRAM(S): 5 TABLET ORAL at 13:04

## 2023-01-01 RX ADMIN — AMIODARONE HYDROCHLORIDE 100 MILLIGRAM(S): 400 TABLET ORAL at 07:20

## 2023-01-01 RX ADMIN — LIDOCAINE 1 PATCH: 4 CREAM TOPICAL at 11:23

## 2023-01-01 RX ADMIN — Medication 1 EACH: at 17:29

## 2023-01-01 RX ADMIN — SODIUM CHLORIDE 50 MILLILITER(S): 9 INJECTION, SOLUTION INTRAVENOUS at 07:05

## 2023-01-01 RX ADMIN — ENOXAPARIN SODIUM 60 MILLIGRAM(S): 100 INJECTION SUBCUTANEOUS at 17:10

## 2023-01-01 RX ADMIN — TIGECYCLINE 105 MILLIGRAM(S): 50 INJECTION, POWDER, LYOPHILIZED, FOR SOLUTION INTRAVENOUS at 18:01

## 2023-01-01 RX ADMIN — Medication 1 TABLET(S): at 11:21

## 2023-01-01 RX ADMIN — Medication 4 MILLIGRAM(S): at 06:44

## 2023-01-01 RX ADMIN — NYSTATIN CREAM 1 APPLICATION(S): 100000 CREAM TOPICAL at 06:42

## 2023-01-01 RX ADMIN — Medication 5 MILLIGRAM(S): at 17:39

## 2023-01-01 RX ADMIN — Medication 500 MILLIGRAM(S): at 14:18

## 2023-01-01 RX ADMIN — Medication 400 MILLIGRAM(S): at 10:12

## 2023-01-01 RX ADMIN — Medication 400 MILLIGRAM(S): at 23:15

## 2023-01-01 RX ADMIN — ENOXAPARIN SODIUM 60 MILLIGRAM(S): 100 INJECTION SUBCUTANEOUS at 07:06

## 2023-01-01 RX ADMIN — HYDROMORPHONE HYDROCHLORIDE 0.5 MILLIGRAM(S): 2 INJECTION INTRAMUSCULAR; INTRAVENOUS; SUBCUTANEOUS at 19:49

## 2023-01-01 RX ADMIN — ENOXAPARIN SODIUM 60 MILLIGRAM(S): 100 INJECTION SUBCUTANEOUS at 12:51

## 2023-01-01 RX ADMIN — ENOXAPARIN SODIUM 60 MILLIGRAM(S): 100 INJECTION SUBCUTANEOUS at 05:54

## 2023-01-01 RX ADMIN — Medication 2 TABLET(S): at 16:01

## 2023-01-01 RX ADMIN — Medication 5 MILLIGRAM(S): at 18:37

## 2023-01-01 RX ADMIN — Medication 2: at 23:31

## 2023-01-01 RX ADMIN — SODIUM CHLORIDE 1000 MILLILITER(S): 9 INJECTION INTRAMUSCULAR; INTRAVENOUS; SUBCUTANEOUS at 10:31

## 2023-01-01 RX ADMIN — PANTOPRAZOLE SODIUM 40 MILLIGRAM(S): 20 TABLET, DELAYED RELEASE ORAL at 18:39

## 2023-01-01 RX ADMIN — OXYCODONE HYDROCHLORIDE 5 MILLIGRAM(S): 5 TABLET ORAL at 11:48

## 2023-01-01 RX ADMIN — Medication 3 MILLIGRAM(S): at 00:17

## 2023-01-01 RX ADMIN — SODIUM CHLORIDE 100 MILLILITER(S): 9 INJECTION, SOLUTION INTRAVENOUS at 17:45

## 2023-01-01 RX ADMIN — OXYCODONE HYDROCHLORIDE 5 MILLIGRAM(S): 5 TABLET ORAL at 10:17

## 2023-01-01 RX ADMIN — MEROPENEM 100 MILLIGRAM(S): 1 INJECTION INTRAVENOUS at 05:36

## 2023-01-01 RX ADMIN — CASPOFUNGIN ACETATE 260 MILLIGRAM(S): 7 INJECTION, POWDER, LYOPHILIZED, FOR SOLUTION INTRAVENOUS at 21:46

## 2023-01-01 RX ADMIN — CHLORHEXIDINE GLUCONATE 1 APPLICATION(S): 213 SOLUTION TOPICAL at 06:20

## 2023-01-01 RX ADMIN — Medication 2 TABLET(S): at 05:54

## 2023-01-01 RX ADMIN — CHOLESTYRAMINE 2 GRAM(S): 4 POWDER, FOR SUSPENSION ORAL at 07:41

## 2023-01-01 RX ADMIN — DAPTOMYCIN 120 MILLIGRAM(S): 500 INJECTION, POWDER, LYOPHILIZED, FOR SOLUTION INTRAVENOUS at 05:28

## 2023-01-01 RX ADMIN — CASPOFUNGIN ACETATE 260 MILLIGRAM(S): 7 INJECTION, POWDER, LYOPHILIZED, FOR SOLUTION INTRAVENOUS at 21:53

## 2023-01-01 RX ADMIN — Medication 1 APPLICATION(S): at 14:41

## 2023-01-01 RX ADMIN — AMIODARONE HYDROCHLORIDE 100 MILLIGRAM(S): 400 TABLET ORAL at 06:31

## 2023-01-01 RX ADMIN — Medication 2 TABLET(S): at 15:33

## 2023-01-01 RX ADMIN — SODIUM CHLORIDE 500 MILLILITER(S): 9 INJECTION, SOLUTION INTRAVENOUS at 05:49

## 2023-01-01 RX ADMIN — Medication 2 TABLET(S): at 14:39

## 2023-01-01 RX ADMIN — Medication 4 MILLIGRAM(S): at 23:06

## 2023-01-01 RX ADMIN — I.V. FAT EMULSION 20.83 GM/KG/DAY: 20 EMULSION INTRAVENOUS at 17:50

## 2023-01-01 RX ADMIN — MORPHINE 6 MILLIGRAM(S): 10 SOLUTION ORAL at 02:19

## 2023-01-01 RX ADMIN — Medication 4 MILLIGRAM(S): at 21:35

## 2023-01-01 RX ADMIN — Medication 2 TABLET(S): at 13:41

## 2023-01-01 RX ADMIN — Medication 400 MILLIGRAM(S): at 22:42

## 2023-01-01 RX ADMIN — Medication 4 MILLIGRAM(S): at 14:41

## 2023-01-01 RX ADMIN — Medication 5 MILLIGRAM(S): at 17:53

## 2023-01-01 RX ADMIN — OXYCODONE HYDROCHLORIDE 5 MILLIGRAM(S): 5 TABLET ORAL at 22:10

## 2023-01-01 RX ADMIN — Medication 1 APPLICATION(S): at 12:02

## 2023-01-01 RX ADMIN — Medication 2 TABLET(S): at 22:07

## 2023-01-01 RX ADMIN — Medication 650 MILLIGRAM(S): at 22:52

## 2023-01-01 RX ADMIN — AMIODARONE HYDROCHLORIDE 200 MILLIGRAM(S): 400 TABLET ORAL at 07:21

## 2023-01-01 RX ADMIN — PANTOPRAZOLE SODIUM 40 MILLIGRAM(S): 20 TABLET, DELAYED RELEASE ORAL at 19:15

## 2023-01-01 RX ADMIN — MIRTAZAPINE 30 MILLIGRAM(S): 45 TABLET, ORALLY DISINTEGRATING ORAL at 22:33

## 2023-01-01 RX ADMIN — Medication 2 TABLET(S): at 00:07

## 2023-01-01 RX ADMIN — OXYCODONE HYDROCHLORIDE 5 MILLIGRAM(S): 5 TABLET ORAL at 12:23

## 2023-01-01 RX ADMIN — I.V. FAT EMULSION 20.83 GM/KG/DAY: 20 EMULSION INTRAVENOUS at 22:25

## 2023-01-01 RX ADMIN — Medication 25 MILLIGRAM(S): at 06:54

## 2023-01-01 RX ADMIN — SODIUM CHLORIDE 600 MILLILITER(S): 9 INJECTION, SOLUTION INTRAVENOUS at 09:41

## 2023-01-01 RX ADMIN — Medication 1 TABLET(S): at 12:53

## 2023-01-01 RX ADMIN — Medication 5 MILLIGRAM(S): at 17:45

## 2023-01-01 RX ADMIN — MORPHINE 6 MILLIGRAM(S): 10 SOLUTION ORAL at 18:20

## 2023-01-01 RX ADMIN — DAPTOMYCIN 120 MILLIGRAM(S): 500 INJECTION, POWDER, LYOPHILIZED, FOR SOLUTION INTRAVENOUS at 11:46

## 2023-01-01 RX ADMIN — CHLORHEXIDINE GLUCONATE 1 APPLICATION(S): 213 SOLUTION TOPICAL at 06:35

## 2023-01-01 RX ADMIN — CHLORHEXIDINE GLUCONATE 1 APPLICATION(S): 213 SOLUTION TOPICAL at 06:15

## 2023-01-01 RX ADMIN — Medication 25 MILLIGRAM(S): at 05:40

## 2023-01-01 RX ADMIN — Medication 400 MILLIGRAM(S): at 11:59

## 2023-01-01 RX ADMIN — HEPARIN SODIUM 13.5 UNIT(S)/HR: 5000 INJECTION INTRAVENOUS; SUBCUTANEOUS at 07:03

## 2023-01-01 RX ADMIN — PANTOPRAZOLE SODIUM 40 MILLIGRAM(S): 20 TABLET, DELAYED RELEASE ORAL at 10:04

## 2023-01-01 RX ADMIN — AMIODARONE HYDROCHLORIDE 400 MILLIGRAM(S): 400 TABLET ORAL at 22:17

## 2023-01-01 RX ADMIN — OXYCODONE HYDROCHLORIDE 5 MILLIGRAM(S): 5 TABLET ORAL at 18:05

## 2023-01-01 RX ADMIN — Medication 2 TABLET(S): at 16:24

## 2023-01-01 RX ADMIN — DAPTOMYCIN 120 MILLIGRAM(S): 500 INJECTION, POWDER, LYOPHILIZED, FOR SOLUTION INTRAVENOUS at 03:41

## 2023-01-01 RX ADMIN — PANTOPRAZOLE SODIUM 10 MG/HR: 20 TABLET, DELAYED RELEASE ORAL at 05:48

## 2023-01-01 RX ADMIN — PANTOPRAZOLE SODIUM 40 MILLIGRAM(S): 20 TABLET, DELAYED RELEASE ORAL at 18:11

## 2023-01-01 RX ADMIN — Medication 5 MILLIGRAM(S): at 18:40

## 2023-01-01 RX ADMIN — Medication 62.5 MILLIMOLE(S): at 18:33

## 2023-01-01 RX ADMIN — SODIUM CHLORIDE 500 MILLILITER(S): 9 INJECTION, SOLUTION INTRAVENOUS at 00:09

## 2023-01-01 RX ADMIN — DAPTOMYCIN 120 MILLIGRAM(S): 500 INJECTION, POWDER, LYOPHILIZED, FOR SOLUTION INTRAVENOUS at 05:06

## 2023-01-01 RX ADMIN — Medication 650 MILLIGRAM(S): at 19:46

## 2023-01-01 RX ADMIN — CHOLESTYRAMINE 2 GRAM(S): 4 POWDER, FOR SUSPENSION ORAL at 14:42

## 2023-01-01 RX ADMIN — MORPHINE 6 MILLIGRAM(S): 10 SOLUTION ORAL at 17:25

## 2023-01-01 RX ADMIN — Medication 1 TABLET(S): at 12:06

## 2023-01-01 RX ADMIN — CHLORHEXIDINE GLUCONATE 1 APPLICATION(S): 213 SOLUTION TOPICAL at 11:17

## 2023-01-01 RX ADMIN — PIPERACILLIN AND TAZOBACTAM 25 GRAM(S): 4; .5 INJECTION, POWDER, LYOPHILIZED, FOR SOLUTION INTRAVENOUS at 00:55

## 2023-01-01 RX ADMIN — Medication 2 TABLET(S): at 14:16

## 2023-01-01 RX ADMIN — CHLORHEXIDINE GLUCONATE 1 APPLICATION(S): 213 SOLUTION TOPICAL at 11:33

## 2023-01-01 RX ADMIN — HYDROMORPHONE HYDROCHLORIDE 0.25 MILLIGRAM(S): 2 INJECTION INTRAMUSCULAR; INTRAVENOUS; SUBCUTANEOUS at 00:56

## 2023-01-01 RX ADMIN — Medication 1 APPLICATION(S): at 11:43

## 2023-01-01 RX ADMIN — ZINC SULFATE TAB 220 MG (50 MG ZINC EQUIVALENT) 220 MILLIGRAM(S): 220 (50 ZN) TAB at 14:15

## 2023-01-01 RX ADMIN — SODIUM CHLORIDE 500 MILLILITER(S): 9 INJECTION, SOLUTION INTRAVENOUS at 21:46

## 2023-01-01 RX ADMIN — HYDROMORPHONE HYDROCHLORIDE 0.25 MILLIGRAM(S): 2 INJECTION INTRAMUSCULAR; INTRAVENOUS; SUBCUTANEOUS at 03:30

## 2023-01-01 RX ADMIN — DAPTOMYCIN 120 MILLIGRAM(S): 500 INJECTION, POWDER, LYOPHILIZED, FOR SOLUTION INTRAVENOUS at 05:48

## 2023-01-01 RX ADMIN — Medication 5 MILLIGRAM(S): at 23:10

## 2023-01-01 RX ADMIN — ERTAPENEM SODIUM 120 MILLIGRAM(S): 1 INJECTION, POWDER, LYOPHILIZED, FOR SOLUTION INTRAMUSCULAR; INTRAVENOUS at 15:25

## 2023-01-01 RX ADMIN — Medication 1000 MILLIGRAM(S): at 11:05

## 2023-01-01 RX ADMIN — ENOXAPARIN SODIUM 60 MILLIGRAM(S): 100 INJECTION SUBCUTANEOUS at 17:08

## 2023-01-01 RX ADMIN — Medication 500 MILLIGRAM(S): at 13:09

## 2023-01-01 RX ADMIN — AMIODARONE HYDROCHLORIDE 16.7 MG/MIN: 400 TABLET ORAL at 19:04

## 2023-01-01 RX ADMIN — Medication 5 MILLIGRAM(S): at 12:10

## 2023-01-01 RX ADMIN — Medication 4 MILLIGRAM(S): at 14:47

## 2023-01-01 RX ADMIN — SODIUM CHLORIDE 10 MILLILITER(S): 9 INJECTION INTRAMUSCULAR; INTRAVENOUS; SUBCUTANEOUS at 14:08

## 2023-01-01 RX ADMIN — ENOXAPARIN SODIUM 60 MILLIGRAM(S): 100 INJECTION SUBCUTANEOUS at 17:40

## 2023-01-01 RX ADMIN — I.V. FAT EMULSION 20.83 GM/KG/DAY: 20 EMULSION INTRAVENOUS at 18:55

## 2023-01-01 RX ADMIN — Medication 116 MILLIGRAM(S): at 22:41

## 2023-01-01 RX ADMIN — NYSTATIN CREAM 1 APPLICATION(S): 100000 CREAM TOPICAL at 05:06

## 2023-01-01 RX ADMIN — Medication 25 MILLIGRAM(S): at 06:51

## 2023-01-01 RX ADMIN — Medication 650 MILLIGRAM(S): at 07:50

## 2023-01-01 RX ADMIN — Medication 1 APPLICATION(S): at 19:12

## 2023-01-01 RX ADMIN — MEROPENEM 100 MILLIGRAM(S): 1 INJECTION INTRAVENOUS at 04:43

## 2023-01-01 RX ADMIN — Medication 500 MILLIGRAM(S): at 11:44

## 2023-01-01 RX ADMIN — Medication 1000 MILLIGRAM(S): at 22:49

## 2023-01-01 RX ADMIN — LIDOCAINE 1 PATCH: 4 CREAM TOPICAL at 05:20

## 2023-01-01 RX ADMIN — AMIODARONE HYDROCHLORIDE 100 MILLIGRAM(S): 400 TABLET ORAL at 06:35

## 2023-01-01 RX ADMIN — Medication 63 EACH: at 19:00

## 2023-01-01 RX ADMIN — ZINC SULFATE TAB 220 MG (50 MG ZINC EQUIVALENT) 220 MILLIGRAM(S): 220 (50 ZN) TAB at 12:08

## 2023-01-01 RX ADMIN — SODIUM CHLORIDE 1000 MILLILITER(S): 9 INJECTION, SOLUTION INTRAVENOUS at 23:05

## 2023-01-01 RX ADMIN — ZINC SULFATE TAB 220 MG (50 MG ZINC EQUIVALENT) 220 MILLIGRAM(S): 220 (50 ZN) TAB at 12:24

## 2023-01-01 RX ADMIN — SODIUM CHLORIDE 125 MILLILITER(S): 9 INJECTION, SOLUTION INTRAVENOUS at 17:09

## 2023-01-01 RX ADMIN — OXYCODONE HYDROCHLORIDE 5 MILLIGRAM(S): 5 TABLET ORAL at 17:53

## 2023-01-01 RX ADMIN — AMIODARONE HYDROCHLORIDE 100 MILLIGRAM(S): 400 TABLET ORAL at 05:10

## 2023-01-01 RX ADMIN — Medication 5 MILLIGRAM(S): at 00:29

## 2023-01-01 RX ADMIN — Medication 4 MILLIGRAM(S): at 06:22

## 2023-01-01 RX ADMIN — ZINC SULFATE TAB 220 MG (50 MG ZINC EQUIVALENT) 220 MILLIGRAM(S): 220 (50 ZN) TAB at 11:28

## 2023-01-01 RX ADMIN — PANTOPRAZOLE SODIUM 40 MILLIGRAM(S): 20 TABLET, DELAYED RELEASE ORAL at 06:17

## 2023-01-01 RX ADMIN — CHOLESTYRAMINE 2 GRAM(S): 4 POWDER, FOR SUSPENSION ORAL at 14:16

## 2023-01-01 RX ADMIN — AMIODARONE HYDROCHLORIDE 200 MILLIGRAM(S): 400 TABLET ORAL at 06:36

## 2023-01-01 RX ADMIN — OXYCODONE HYDROCHLORIDE 10 MILLIGRAM(S): 5 TABLET ORAL at 16:01

## 2023-01-01 RX ADMIN — SODIUM CHLORIDE 10 MILLILITER(S): 9 INJECTION INTRAMUSCULAR; INTRAVENOUS; SUBCUTANEOUS at 05:29

## 2023-01-01 RX ADMIN — Medication 4 MILLIGRAM(S): at 13:14

## 2023-01-01 RX ADMIN — NYSTATIN CREAM 1 APPLICATION(S): 100000 CREAM TOPICAL at 18:39

## 2023-01-01 RX ADMIN — HYDROMORPHONE HYDROCHLORIDE 0.5 MILLIGRAM(S): 2 INJECTION INTRAMUSCULAR; INTRAVENOUS; SUBCUTANEOUS at 16:45

## 2023-01-01 RX ADMIN — Medication 5 MILLIGRAM(S): at 23:52

## 2023-01-01 RX ADMIN — Medication 25 MILLIGRAM(S): at 06:28

## 2023-01-01 RX ADMIN — PANTOPRAZOLE SODIUM 40 MILLIGRAM(S): 20 TABLET, DELAYED RELEASE ORAL at 18:42

## 2023-01-01 RX ADMIN — TIGECYCLINE 105 MILLIGRAM(S): 50 INJECTION, POWDER, LYOPHILIZED, FOR SOLUTION INTRAVENOUS at 05:31

## 2023-01-01 RX ADMIN — ENOXAPARIN SODIUM 60 MILLIGRAM(S): 100 INJECTION SUBCUTANEOUS at 23:47

## 2023-01-01 RX ADMIN — MORPHINE 6 MILLIGRAM(S): 10 SOLUTION ORAL at 07:23

## 2023-01-01 RX ADMIN — ENOXAPARIN SODIUM 60 MILLIGRAM(S): 100 INJECTION SUBCUTANEOUS at 06:29

## 2023-01-01 RX ADMIN — ZINC SULFATE TAB 220 MG (50 MG ZINC EQUIVALENT) 220 MILLIGRAM(S): 220 (50 ZN) TAB at 13:01

## 2023-01-01 RX ADMIN — ENOXAPARIN SODIUM 60 MILLIGRAM(S): 100 INJECTION SUBCUTANEOUS at 18:19

## 2023-01-01 RX ADMIN — Medication 2 TABLET(S): at 22:36

## 2023-01-01 RX ADMIN — SODIUM CHLORIDE 1000 MILLILITER(S): 9 INJECTION, SOLUTION INTRAVENOUS at 23:30

## 2023-01-01 RX ADMIN — Medication 1 EACH: at 18:23

## 2023-01-01 RX ADMIN — SODIUM CHLORIDE 750 MILLILITER(S): 9 INJECTION, SOLUTION INTRAVENOUS at 10:46

## 2023-01-01 RX ADMIN — Medication 650 MILLIGRAM(S): at 11:22

## 2023-01-01 RX ADMIN — PANTOPRAZOLE SODIUM 40 MILLIGRAM(S): 20 TABLET, DELAYED RELEASE ORAL at 18:26

## 2023-01-01 RX ADMIN — SODIUM CHLORIDE 300 MILLILITER(S): 9 INJECTION, SOLUTION INTRAVENOUS at 00:47

## 2023-01-01 RX ADMIN — HYDROMORPHONE HYDROCHLORIDE 0.25 MILLIGRAM(S): 2 INJECTION INTRAMUSCULAR; INTRAVENOUS; SUBCUTANEOUS at 22:13

## 2023-01-01 RX ADMIN — Medication 25 MILLIGRAM(S): at 05:22

## 2023-01-01 RX ADMIN — MORPHINE 6 MILLIGRAM(S): 10 SOLUTION ORAL at 08:40

## 2023-01-01 RX ADMIN — Medication 2 TABLET(S): at 21:29

## 2023-01-01 RX ADMIN — ENOXAPARIN SODIUM 60 MILLIGRAM(S): 100 INJECTION SUBCUTANEOUS at 12:27

## 2023-01-01 RX ADMIN — Medication 1 TABLET(S): at 11:49

## 2023-01-01 RX ADMIN — SACUBITRIL AND VALSARTAN 1 TABLET(S): 24; 26 TABLET, FILM COATED ORAL at 05:21

## 2023-01-01 RX ADMIN — SODIUM CHLORIDE 10 MILLILITER(S): 9 INJECTION INTRAMUSCULAR; INTRAVENOUS; SUBCUTANEOUS at 14:02

## 2023-01-01 RX ADMIN — Medication 5 MILLIGRAM(S): at 06:18

## 2023-01-01 RX ADMIN — PANTOPRAZOLE SODIUM 40 MILLIGRAM(S): 20 TABLET, DELAYED RELEASE ORAL at 09:46

## 2023-01-01 RX ADMIN — Medication 100 MILLIEQUIVALENT(S): at 11:19

## 2023-01-01 RX ADMIN — HEPARIN SODIUM 13 UNIT(S)/HR: 5000 INJECTION INTRAVENOUS; SUBCUTANEOUS at 04:41

## 2023-01-01 RX ADMIN — PANTOPRAZOLE SODIUM 40 MILLIGRAM(S): 20 TABLET, DELAYED RELEASE ORAL at 21:57

## 2023-01-01 RX ADMIN — Medication 4 MILLIGRAM(S): at 13:55

## 2023-01-01 RX ADMIN — Medication 5 MILLIGRAM(S): at 18:24

## 2023-01-01 RX ADMIN — Medication 500 MILLIGRAM(S): at 13:28

## 2023-01-01 RX ADMIN — ZINC SULFATE TAB 220 MG (50 MG ZINC EQUIVALENT) 220 MILLIGRAM(S): 220 (50 ZN) TAB at 12:22

## 2023-01-01 RX ADMIN — AMIODARONE HYDROCHLORIDE 16.7 MG/MIN: 400 TABLET ORAL at 05:00

## 2023-01-01 RX ADMIN — LIDOCAINE 1 PATCH: 4 CREAM TOPICAL at 00:11

## 2023-01-01 RX ADMIN — Medication 5 MILLIGRAM(S): at 12:07

## 2023-01-01 RX ADMIN — HEPARIN SODIUM 10 UNIT(S)/HR: 5000 INJECTION INTRAVENOUS; SUBCUTANEOUS at 02:39

## 2023-01-01 RX ADMIN — Medication 650 MILLIGRAM(S): at 01:58

## 2023-01-01 RX ADMIN — CHLORHEXIDINE GLUCONATE 1 APPLICATION(S): 213 SOLUTION TOPICAL at 11:07

## 2023-01-01 RX ADMIN — HYDROMORPHONE HYDROCHLORIDE 0.25 MILLIGRAM(S): 2 INJECTION INTRAMUSCULAR; INTRAVENOUS; SUBCUTANEOUS at 18:30

## 2023-01-01 RX ADMIN — Medication 400 MILLIGRAM(S): at 10:42

## 2023-01-01 RX ADMIN — MORPHINE 6 MILLIGRAM(S): 10 SOLUTION ORAL at 19:43

## 2023-01-01 RX ADMIN — MIRTAZAPINE 30 MILLIGRAM(S): 45 TABLET, ORALLY DISINTEGRATING ORAL at 22:08

## 2023-01-01 RX ADMIN — OXYCODONE HYDROCHLORIDE 5 MILLIGRAM(S): 5 TABLET ORAL at 01:46

## 2023-01-01 RX ADMIN — HYDROMORPHONE HYDROCHLORIDE 0.5 MILLIGRAM(S): 2 INJECTION INTRAMUSCULAR; INTRAVENOUS; SUBCUTANEOUS at 23:45

## 2023-01-01 RX ADMIN — MORPHINE 6 MILLIGRAM(S): 10 SOLUTION ORAL at 06:37

## 2023-01-01 RX ADMIN — Medication 2 TABLET(S): at 06:34

## 2023-01-01 RX ADMIN — Medication 6.04 MICROGRAM(S)/KG/MIN: at 20:20

## 2023-01-01 RX ADMIN — Medication 2 TABLET(S): at 06:19

## 2023-01-01 RX ADMIN — SODIUM CHLORIDE 1000 MILLILITER(S): 9 INJECTION INTRAMUSCULAR; INTRAVENOUS; SUBCUTANEOUS at 03:40

## 2023-01-01 RX ADMIN — PIPERACILLIN AND TAZOBACTAM 25 GRAM(S): 4; .5 INJECTION, POWDER, LYOPHILIZED, FOR SOLUTION INTRAVENOUS at 07:18

## 2023-01-01 RX ADMIN — SODIUM CHLORIDE 10 MILLILITER(S): 9 INJECTION INTRAMUSCULAR; INTRAVENOUS; SUBCUTANEOUS at 22:33

## 2023-01-01 RX ADMIN — Medication 1 EACH: at 18:57

## 2023-01-01 RX ADMIN — PANTOPRAZOLE SODIUM 40 MILLIGRAM(S): 20 TABLET, DELAYED RELEASE ORAL at 22:16

## 2023-01-01 RX ADMIN — OXYCODONE HYDROCHLORIDE 5 MILLIGRAM(S): 5 TABLET ORAL at 01:36

## 2023-01-01 RX ADMIN — TIGECYCLINE 105 MILLIGRAM(S): 50 INJECTION, POWDER, LYOPHILIZED, FOR SOLUTION INTRAVENOUS at 18:55

## 2023-01-01 RX ADMIN — Medication 5 MILLIGRAM(S): at 18:03

## 2023-01-01 RX ADMIN — ENOXAPARIN SODIUM 65 MILLIGRAM(S): 100 INJECTION SUBCUTANEOUS at 05:51

## 2023-01-01 RX ADMIN — MORPHINE 6 MILLIGRAM(S): 10 SOLUTION ORAL at 18:57

## 2023-01-01 RX ADMIN — Medication 2 TABLET(S): at 23:09

## 2023-01-01 RX ADMIN — HYDROMORPHONE HYDROCHLORIDE 0.25 MILLIGRAM(S): 2 INJECTION INTRAMUSCULAR; INTRAVENOUS; SUBCUTANEOUS at 18:35

## 2023-01-01 RX ADMIN — SODIUM CHLORIDE 10 MILLILITER(S): 9 INJECTION INTRAMUSCULAR; INTRAVENOUS; SUBCUTANEOUS at 06:38

## 2023-01-01 RX ADMIN — Medication 5 MILLIGRAM(S): at 05:50

## 2023-01-01 RX ADMIN — Medication 2 TABLET(S): at 10:41

## 2023-01-01 RX ADMIN — SODIUM CHLORIDE 600 MILLILITER(S): 9 INJECTION, SOLUTION INTRAVENOUS at 09:07

## 2023-01-01 RX ADMIN — PANTOPRAZOLE SODIUM 40 MILLIGRAM(S): 20 TABLET, DELAYED RELEASE ORAL at 05:49

## 2023-01-01 RX ADMIN — SODIUM CHLORIDE 1000 MILLILITER(S): 9 INJECTION, SOLUTION INTRAVENOUS at 17:58

## 2023-01-01 RX ADMIN — SPIRONOLACTONE 25 MILLIGRAM(S): 25 TABLET, FILM COATED ORAL at 05:50

## 2023-01-01 RX ADMIN — MORPHINE 6 MILLIGRAM(S): 10 SOLUTION ORAL at 02:50

## 2023-01-01 RX ADMIN — SODIUM CHLORIDE 500 MILLILITER(S): 9 INJECTION, SOLUTION INTRAVENOUS at 04:00

## 2023-01-01 RX ADMIN — SODIUM CHLORIDE 700 MILLILITER(S): 9 INJECTION, SOLUTION INTRAVENOUS at 17:05

## 2023-01-01 RX ADMIN — Medication 63 EACH: at 19:42

## 2023-01-01 RX ADMIN — HYDROMORPHONE HYDROCHLORIDE 0.25 MILLIGRAM(S): 2 INJECTION INTRAMUSCULAR; INTRAVENOUS; SUBCUTANEOUS at 03:15

## 2023-01-01 RX ADMIN — SODIUM CHLORIDE 1000 MILLILITER(S): 9 INJECTION, SOLUTION INTRAVENOUS at 02:01

## 2023-01-01 RX ADMIN — Medication 2 TABLET(S): at 06:14

## 2023-01-01 RX ADMIN — CHOLESTYRAMINE 2 GRAM(S): 4 POWDER, FOR SUSPENSION ORAL at 16:27

## 2023-01-01 RX ADMIN — ENOXAPARIN SODIUM 65 MILLIGRAM(S): 100 INJECTION SUBCUTANEOUS at 06:05

## 2023-01-01 RX ADMIN — NYSTATIN CREAM 1 APPLICATION(S): 100000 CREAM TOPICAL at 05:35

## 2023-01-01 RX ADMIN — SODIUM CHLORIDE 100 MILLILITER(S): 9 INJECTION, SOLUTION INTRAVENOUS at 21:35

## 2023-01-01 RX ADMIN — Medication 1 APPLICATION(S): at 18:29

## 2023-01-01 RX ADMIN — Medication 1000 MILLIGRAM(S): at 08:51

## 2023-01-01 RX ADMIN — HYDROMORPHONE HYDROCHLORIDE 0.5 MILLIGRAM(S): 2 INJECTION INTRAMUSCULAR; INTRAVENOUS; SUBCUTANEOUS at 10:10

## 2023-01-01 RX ADMIN — SODIUM CHLORIDE 500 MILLILITER(S): 9 INJECTION, SOLUTION INTRAVENOUS at 06:07

## 2023-01-01 RX ADMIN — Medication 1 TABLET(S): at 12:01

## 2023-01-01 RX ADMIN — SODIUM CHLORIDE 10 MILLILITER(S): 9 INJECTION INTRAMUSCULAR; INTRAVENOUS; SUBCUTANEOUS at 13:40

## 2023-01-01 RX ADMIN — CHLORHEXIDINE GLUCONATE 1 APPLICATION(S): 213 SOLUTION TOPICAL at 11:58

## 2023-01-01 RX ADMIN — HYDROMORPHONE HYDROCHLORIDE 0.5 MILLIGRAM(S): 2 INJECTION INTRAMUSCULAR; INTRAVENOUS; SUBCUTANEOUS at 23:35

## 2023-01-01 RX ADMIN — ENOXAPARIN SODIUM 65 MILLIGRAM(S): 100 INJECTION SUBCUTANEOUS at 05:09

## 2023-01-01 RX ADMIN — I.V. FAT EMULSION 20.83 GM/KG/DAY: 20 EMULSION INTRAVENOUS at 18:12

## 2023-01-01 RX ADMIN — SODIUM CHLORIDE 1000 MILLILITER(S): 9 INJECTION, SOLUTION INTRAVENOUS at 01:46

## 2023-01-01 RX ADMIN — HEPARIN SODIUM 5000 UNIT(S): 5000 INJECTION INTRAVENOUS; SUBCUTANEOUS at 22:34

## 2023-01-01 RX ADMIN — ENOXAPARIN SODIUM 65 MILLIGRAM(S): 100 INJECTION SUBCUTANEOUS at 05:00

## 2023-01-01 RX ADMIN — LIDOCAINE 1 PATCH: 4 CREAM TOPICAL at 19:38

## 2023-01-01 RX ADMIN — Medication 400 MILLIGRAM(S): at 16:10

## 2023-01-01 RX ADMIN — Medication 650 MILLIGRAM(S): at 14:00

## 2023-01-01 RX ADMIN — Medication 2 TABLET(S): at 05:50

## 2023-01-01 RX ADMIN — MORPHINE 6 MILLIGRAM(S): 10 SOLUTION ORAL at 17:35

## 2023-01-01 RX ADMIN — MORPHINE 6 MILLIGRAM(S): 10 SOLUTION ORAL at 14:15

## 2023-01-01 RX ADMIN — Medication 4 MILLIGRAM(S): at 21:50

## 2023-01-01 RX ADMIN — HYDROMORPHONE HYDROCHLORIDE 0.5 MILLIGRAM(S): 2 INJECTION INTRAMUSCULAR; INTRAVENOUS; SUBCUTANEOUS at 10:45

## 2023-01-01 RX ADMIN — Medication 500 MILLIGRAM(S): at 12:17

## 2023-01-01 RX ADMIN — Medication 400 MILLIGRAM(S): at 20:26

## 2023-01-01 RX ADMIN — Medication 4 MILLIGRAM(S): at 22:38

## 2023-01-01 RX ADMIN — HYDROMORPHONE HYDROCHLORIDE 0.25 MILLIGRAM(S): 2 INJECTION INTRAMUSCULAR; INTRAVENOUS; SUBCUTANEOUS at 01:38

## 2023-01-01 RX ADMIN — ZINC SULFATE TAB 220 MG (50 MG ZINC EQUIVALENT) 220 MILLIGRAM(S): 220 (50 ZN) TAB at 13:41

## 2023-01-01 RX ADMIN — CHLORHEXIDINE GLUCONATE 1 APPLICATION(S): 213 SOLUTION TOPICAL at 09:13

## 2023-01-01 RX ADMIN — Medication 100 GRAM(S): at 13:45

## 2023-01-01 RX ADMIN — SODIUM CHLORIDE 1000 MILLILITER(S): 9 INJECTION, SOLUTION INTRAVENOUS at 21:40

## 2023-01-01 RX ADMIN — SACUBITRIL AND VALSARTAN 1 TABLET(S): 24; 26 TABLET, FILM COATED ORAL at 09:25

## 2023-01-01 RX ADMIN — Medication 1 APPLICATION(S): at 13:19

## 2023-01-01 RX ADMIN — SPIRONOLACTONE 25 MILLIGRAM(S): 25 TABLET, FILM COATED ORAL at 06:53

## 2023-01-01 RX ADMIN — SPIRONOLACTONE 25 MILLIGRAM(S): 25 TABLET, FILM COATED ORAL at 06:35

## 2023-01-01 RX ADMIN — ENOXAPARIN SODIUM 60 MILLIGRAM(S): 100 INJECTION SUBCUTANEOUS at 07:20

## 2023-01-01 RX ADMIN — Medication 500 MILLIGRAM(S): at 13:22

## 2023-01-01 RX ADMIN — HYDROMORPHONE HYDROCHLORIDE 0.5 MILLIGRAM(S): 2 INJECTION INTRAMUSCULAR; INTRAVENOUS; SUBCUTANEOUS at 11:24

## 2023-01-01 RX ADMIN — OXYCODONE HYDROCHLORIDE 5 MILLIGRAM(S): 5 TABLET ORAL at 14:55

## 2023-01-01 RX ADMIN — ZINC SULFATE TAB 220 MG (50 MG ZINC EQUIVALENT) 220 MILLIGRAM(S): 220 (50 ZN) TAB at 11:07

## 2023-01-01 RX ADMIN — ZINC SULFATE TAB 220 MG (50 MG ZINC EQUIVALENT) 220 MILLIGRAM(S): 220 (50 ZN) TAB at 11:51

## 2023-01-01 RX ADMIN — Medication 650 MILLIGRAM(S): at 02:19

## 2023-01-01 RX ADMIN — SODIUM CHLORIDE 10 MILLILITER(S): 9 INJECTION INTRAMUSCULAR; INTRAVENOUS; SUBCUTANEOUS at 06:58

## 2023-01-01 RX ADMIN — Medication 650 MILLIGRAM(S): at 05:27

## 2023-01-01 RX ADMIN — HYDROMORPHONE HYDROCHLORIDE 0.5 MILLIGRAM(S): 2 INJECTION INTRAMUSCULAR; INTRAVENOUS; SUBCUTANEOUS at 11:03

## 2023-01-01 RX ADMIN — SODIUM CHLORIDE 500 MILLILITER(S): 9 INJECTION INTRAMUSCULAR; INTRAVENOUS; SUBCUTANEOUS at 19:09

## 2023-01-01 RX ADMIN — PANTOPRAZOLE SODIUM 40 MILLIGRAM(S): 20 TABLET, DELAYED RELEASE ORAL at 21:31

## 2023-01-01 RX ADMIN — PANTOPRAZOLE SODIUM 40 MILLIGRAM(S): 20 TABLET, DELAYED RELEASE ORAL at 10:06

## 2023-01-01 RX ADMIN — Medication 4 MILLIGRAM(S): at 06:43

## 2023-01-01 RX ADMIN — SODIUM CHLORIDE 500 MILLILITER(S): 9 INJECTION, SOLUTION INTRAVENOUS at 21:52

## 2023-01-01 RX ADMIN — I.V. FAT EMULSION 20.83 GM/KG/DAY: 20 EMULSION INTRAVENOUS at 22:00

## 2023-01-01 RX ADMIN — HYDROMORPHONE HYDROCHLORIDE 0.5 MILLIGRAM(S): 2 INJECTION INTRAMUSCULAR; INTRAVENOUS; SUBCUTANEOUS at 19:13

## 2023-01-01 RX ADMIN — Medication 4 MILLIGRAM(S): at 22:02

## 2023-01-01 RX ADMIN — MORPHINE 6 MILLIGRAM(S): 10 SOLUTION ORAL at 11:28

## 2023-01-01 RX ADMIN — PANTOPRAZOLE SODIUM 40 MILLIGRAM(S): 20 TABLET, DELAYED RELEASE ORAL at 15:48

## 2023-01-01 RX ADMIN — SODIUM CHLORIDE 500 MILLILITER(S): 9 INJECTION, SOLUTION INTRAVENOUS at 02:19

## 2023-01-01 RX ADMIN — Medication 500 MILLIGRAM(S): at 12:54

## 2023-01-01 RX ADMIN — Medication 650 MILLIGRAM(S): at 21:36

## 2023-01-01 RX ADMIN — AMIODARONE HYDROCHLORIDE 400 MILLIGRAM(S): 400 TABLET ORAL at 12:00

## 2023-01-01 RX ADMIN — PANTOPRAZOLE SODIUM 40 MILLIGRAM(S): 20 TABLET, DELAYED RELEASE ORAL at 05:13

## 2023-01-01 RX ADMIN — SODIUM CHLORIDE 1000 MILLILITER(S): 9 INJECTION INTRAMUSCULAR; INTRAVENOUS; SUBCUTANEOUS at 00:58

## 2023-01-01 RX ADMIN — ZINC SULFATE TAB 220 MG (50 MG ZINC EQUIVALENT) 220 MILLIGRAM(S): 220 (50 ZN) TAB at 11:29

## 2023-01-01 RX ADMIN — FLUCONAZOLE 100 MILLIGRAM(S): 150 TABLET ORAL at 16:21

## 2023-01-01 RX ADMIN — ENOXAPARIN SODIUM 60 MILLIGRAM(S): 100 INJECTION SUBCUTANEOUS at 00:09

## 2023-01-01 RX ADMIN — Medication 400 MILLIGRAM(S): at 16:13

## 2023-01-01 RX ADMIN — ERTAPENEM SODIUM 120 MILLIGRAM(S): 1 INJECTION, POWDER, LYOPHILIZED, FOR SOLUTION INTRAMUSCULAR; INTRAVENOUS at 09:10

## 2023-01-01 RX ADMIN — Medication 650 MILLIGRAM(S): at 21:04

## 2023-01-01 RX ADMIN — SACUBITRIL AND VALSARTAN 1 TABLET(S): 24; 26 TABLET, FILM COATED ORAL at 19:04

## 2023-01-01 RX ADMIN — PANTOPRAZOLE SODIUM 40 MILLIGRAM(S): 20 TABLET, DELAYED RELEASE ORAL at 05:59

## 2023-01-01 RX ADMIN — Medication 5 MILLIGRAM(S): at 14:38

## 2023-01-01 RX ADMIN — Medication 4 MILLIGRAM(S): at 13:42

## 2023-01-01 RX ADMIN — CHLORHEXIDINE GLUCONATE 1 APPLICATION(S): 213 SOLUTION TOPICAL at 14:40

## 2023-01-01 RX ADMIN — Medication 5 MILLIGRAM(S): at 14:03

## 2023-01-01 RX ADMIN — ENOXAPARIN SODIUM 60 MILLIGRAM(S): 100 INJECTION SUBCUTANEOUS at 11:30

## 2023-01-01 RX ADMIN — SACUBITRIL AND VALSARTAN 1 TABLET(S): 24; 26 TABLET, FILM COATED ORAL at 05:20

## 2023-01-01 RX ADMIN — MORPHINE 6 MILLIGRAM(S): 10 SOLUTION ORAL at 05:43

## 2023-01-01 RX ADMIN — CASPOFUNGIN ACETATE 260 MILLIGRAM(S): 7 INJECTION, POWDER, LYOPHILIZED, FOR SOLUTION INTRAVENOUS at 21:19

## 2023-01-01 RX ADMIN — MORPHINE 6 MILLIGRAM(S): 10 SOLUTION ORAL at 07:19

## 2023-01-01 RX ADMIN — Medication 4 MILLIGRAM(S): at 21:54

## 2023-01-01 RX ADMIN — Medication 500 MILLIGRAM(S): at 12:07

## 2023-01-01 RX ADMIN — PANTOPRAZOLE SODIUM 40 MILLIGRAM(S): 20 TABLET, DELAYED RELEASE ORAL at 11:33

## 2023-01-01 RX ADMIN — Medication 1 TABLET(S): at 13:49

## 2023-01-01 RX ADMIN — Medication 166.67 MILLIGRAM(S): at 17:07

## 2023-01-01 RX ADMIN — Medication 4 MILLIGRAM(S): at 13:51

## 2023-01-01 RX ADMIN — I.V. FAT EMULSION 20.83 GM/KG/DAY: 20 EMULSION INTRAVENOUS at 17:03

## 2023-01-01 RX ADMIN — AMIODARONE HYDROCHLORIDE 400 MILLIGRAM(S): 400 TABLET ORAL at 10:41

## 2023-01-01 RX ADMIN — ZINC SULFATE TAB 220 MG (50 MG ZINC EQUIVALENT) 220 MILLIGRAM(S): 220 (50 ZN) TAB at 13:28

## 2023-01-01 RX ADMIN — Medication 25 MILLIGRAM(S): at 05:48

## 2023-01-01 RX ADMIN — Medication 650 MILLIGRAM(S): at 21:40

## 2023-01-01 RX ADMIN — Medication 650 MILLIGRAM(S): at 13:30

## 2023-01-01 RX ADMIN — I.V. FAT EMULSION 20.8 GM/KG/DAY: 20 EMULSION INTRAVENOUS at 21:25

## 2023-01-01 RX ADMIN — Medication 1 PACKET(S): at 18:32

## 2023-01-01 RX ADMIN — AMIODARONE HYDROCHLORIDE 33.3 MG/MIN: 400 TABLET ORAL at 06:43

## 2023-01-01 RX ADMIN — HYDROMORPHONE HYDROCHLORIDE 0.5 MILLIGRAM(S): 2 INJECTION INTRAMUSCULAR; INTRAVENOUS; SUBCUTANEOUS at 12:16

## 2023-01-01 RX ADMIN — MORPHINE 6 MILLIGRAM(S): 10 SOLUTION ORAL at 18:07

## 2023-01-01 RX ADMIN — Medication 1 EACH: at 17:15

## 2023-01-01 RX ADMIN — Medication 650 MILLIGRAM(S): at 16:46

## 2023-01-01 RX ADMIN — Medication 500 MILLIGRAM(S): at 12:09

## 2023-01-01 RX ADMIN — SODIUM CHLORIDE 100 MILLILITER(S): 9 INJECTION, SOLUTION INTRAVENOUS at 13:34

## 2023-01-01 RX ADMIN — Medication 1 APPLICATION(S): at 17:03

## 2023-01-01 RX ADMIN — PANTOPRAZOLE SODIUM 40 MILLIGRAM(S): 20 TABLET, DELAYED RELEASE ORAL at 05:42

## 2023-01-01 RX ADMIN — AMIODARONE HYDROCHLORIDE 100 MILLIGRAM(S): 400 TABLET ORAL at 06:39

## 2023-01-01 RX ADMIN — Medication 4 MILLIGRAM(S): at 16:14

## 2023-01-01 RX ADMIN — HYDROMORPHONE HYDROCHLORIDE 0.5 MILLIGRAM(S): 2 INJECTION INTRAMUSCULAR; INTRAVENOUS; SUBCUTANEOUS at 10:03

## 2023-01-01 RX ADMIN — MORPHINE 6 MILLIGRAM(S): 10 SOLUTION ORAL at 00:03

## 2023-01-01 RX ADMIN — SODIUM CHLORIDE 100 MILLILITER(S): 9 INJECTION, SOLUTION INTRAVENOUS at 21:55

## 2023-01-01 RX ADMIN — MIRTAZAPINE 30 MILLIGRAM(S): 45 TABLET, ORALLY DISINTEGRATING ORAL at 21:44

## 2023-01-01 RX ADMIN — OXYCODONE HYDROCHLORIDE 5 MILLIGRAM(S): 5 TABLET ORAL at 11:05

## 2023-01-01 RX ADMIN — MIRTAZAPINE 30 MILLIGRAM(S): 45 TABLET, ORALLY DISINTEGRATING ORAL at 22:20

## 2023-01-01 RX ADMIN — CHOLESTYRAMINE 2 GRAM(S): 4 POWDER, FOR SUSPENSION ORAL at 22:25

## 2023-01-01 RX ADMIN — PANTOPRAZOLE SODIUM 40 MILLIGRAM(S): 20 TABLET, DELAYED RELEASE ORAL at 18:01

## 2023-01-01 RX ADMIN — MIRTAZAPINE 30 MILLIGRAM(S): 45 TABLET, ORALLY DISINTEGRATING ORAL at 21:51

## 2023-01-01 RX ADMIN — HYDROMORPHONE HYDROCHLORIDE 0.5 MILLIGRAM(S): 2 INJECTION INTRAMUSCULAR; INTRAVENOUS; SUBCUTANEOUS at 06:30

## 2023-01-01 RX ADMIN — ERTAPENEM SODIUM 120 MILLIGRAM(S): 1 INJECTION, POWDER, LYOPHILIZED, FOR SOLUTION INTRAMUSCULAR; INTRAVENOUS at 12:08

## 2023-01-01 RX ADMIN — ENOXAPARIN SODIUM 60 MILLIGRAM(S): 100 INJECTION SUBCUTANEOUS at 17:01

## 2023-01-01 RX ADMIN — MORPHINE 6 MILLIGRAM(S): 10 SOLUTION ORAL at 19:29

## 2023-01-01 RX ADMIN — Medication 500 MILLIGRAM(S): at 11:54

## 2023-01-01 RX ADMIN — Medication 2 TABLET(S): at 05:21

## 2023-01-01 RX ADMIN — Medication 400 MILLIGRAM(S): at 04:10

## 2023-01-01 RX ADMIN — SODIUM CHLORIDE 325 MILLILITER(S): 9 INJECTION, SOLUTION INTRAVENOUS at 05:22

## 2023-01-01 RX ADMIN — PANTOPRAZOLE SODIUM 10 MG/HR: 20 TABLET, DELAYED RELEASE ORAL at 01:59

## 2023-01-01 RX ADMIN — SODIUM CHLORIDE 10 MILLILITER(S): 9 INJECTION INTRAMUSCULAR; INTRAVENOUS; SUBCUTANEOUS at 15:47

## 2023-01-01 RX ADMIN — SODIUM CHLORIDE 1000 MILLILITER(S): 9 INJECTION, SOLUTION INTRAVENOUS at 07:45

## 2023-01-01 RX ADMIN — CHLORHEXIDINE GLUCONATE 1 APPLICATION(S): 213 SOLUTION TOPICAL at 11:44

## 2023-01-01 RX ADMIN — SODIUM CHLORIDE 500 MILLILITER(S): 9 INJECTION INTRAMUSCULAR; INTRAVENOUS; SUBCUTANEOUS at 22:43

## 2023-01-01 RX ADMIN — ZINC SULFATE TAB 220 MG (50 MG ZINC EQUIVALENT) 220 MILLIGRAM(S): 220 (50 ZN) TAB at 14:01

## 2023-01-01 RX ADMIN — MORPHINE 6 MILLIGRAM(S): 10 SOLUTION ORAL at 05:36

## 2023-01-01 RX ADMIN — HYDROMORPHONE HYDROCHLORIDE 0.5 MILLIGRAM(S): 2 INJECTION INTRAMUSCULAR; INTRAVENOUS; SUBCUTANEOUS at 22:11

## 2023-01-01 RX ADMIN — HYDROMORPHONE HYDROCHLORIDE 0.25 MILLIGRAM(S): 2 INJECTION INTRAMUSCULAR; INTRAVENOUS; SUBCUTANEOUS at 18:15

## 2023-01-01 RX ADMIN — I.V. FAT EMULSION 20.83 GM/KG/DAY: 20 EMULSION INTRAVENOUS at 18:04

## 2023-01-01 RX ADMIN — PIPERACILLIN AND TAZOBACTAM 25 GRAM(S): 4; .5 INJECTION, POWDER, LYOPHILIZED, FOR SOLUTION INTRAVENOUS at 00:02

## 2023-01-01 RX ADMIN — Medication 650 MILLIGRAM(S): at 14:38

## 2023-01-01 RX ADMIN — Medication 1000 MILLIGRAM(S): at 15:12

## 2023-01-01 RX ADMIN — Medication 5 MILLIGRAM(S): at 22:02

## 2023-01-01 RX ADMIN — OXYCODONE HYDROCHLORIDE 5 MILLIGRAM(S): 5 TABLET ORAL at 11:45

## 2023-01-01 RX ADMIN — PANTOPRAZOLE SODIUM 40 MILLIGRAM(S): 20 TABLET, DELAYED RELEASE ORAL at 18:29

## 2023-01-01 RX ADMIN — HEPARIN SODIUM 10 UNIT(S)/HR: 5000 INJECTION INTRAVENOUS; SUBCUTANEOUS at 21:26

## 2023-01-01 RX ADMIN — Medication 1 APPLICATION(S): at 14:02

## 2023-01-01 RX ADMIN — OXYCODONE HYDROCHLORIDE 5 MILLIGRAM(S): 5 TABLET ORAL at 04:40

## 2023-01-01 RX ADMIN — CHLORHEXIDINE GLUCONATE 1 APPLICATION(S): 213 SOLUTION TOPICAL at 12:23

## 2023-01-01 RX ADMIN — Medication 2 TABLET(S): at 21:22

## 2023-01-01 RX ADMIN — Medication 500 MILLIGRAM(S): at 11:50

## 2023-01-01 RX ADMIN — ENOXAPARIN SODIUM 65 MILLIGRAM(S): 100 INJECTION SUBCUTANEOUS at 18:44

## 2023-01-01 RX ADMIN — SODIUM CHLORIDE 350 MILLILITER(S): 9 INJECTION, SOLUTION INTRAVENOUS at 09:18

## 2023-01-01 RX ADMIN — Medication 4 MILLIGRAM(S): at 06:41

## 2023-01-01 RX ADMIN — HYDROMORPHONE HYDROCHLORIDE 0.25 MILLIGRAM(S): 2 INJECTION INTRAMUSCULAR; INTRAVENOUS; SUBCUTANEOUS at 14:59

## 2023-01-01 RX ADMIN — HEPARIN SODIUM 14.5 UNIT(S)/HR: 5000 INJECTION INTRAVENOUS; SUBCUTANEOUS at 22:55

## 2023-01-01 RX ADMIN — SODIUM CHLORIDE 1000 MILLILITER(S): 9 INJECTION INTRAMUSCULAR; INTRAVENOUS; SUBCUTANEOUS at 02:30

## 2023-01-01 RX ADMIN — Medication 2 TABLET(S): at 06:29

## 2023-01-01 RX ADMIN — MIRTAZAPINE 15 MILLIGRAM(S): 45 TABLET, ORALLY DISINTEGRATING ORAL at 22:49

## 2023-01-01 RX ADMIN — Medication 5 MILLIGRAM(S): at 05:40

## 2023-01-01 RX ADMIN — SODIUM CHLORIDE 10 MILLILITER(S): 9 INJECTION INTRAMUSCULAR; INTRAVENOUS; SUBCUTANEOUS at 22:18

## 2023-01-01 RX ADMIN — Medication 400 MILLIGRAM(S): at 21:19

## 2023-01-01 RX ADMIN — SODIUM CHLORIDE 1000 MILLILITER(S): 9 INJECTION, SOLUTION INTRAVENOUS at 19:57

## 2023-01-01 RX ADMIN — OXYCODONE HYDROCHLORIDE 5 MILLIGRAM(S): 5 TABLET ORAL at 18:53

## 2023-01-01 RX ADMIN — HYDROMORPHONE HYDROCHLORIDE 0.5 MILLIGRAM(S): 2 INJECTION INTRAMUSCULAR; INTRAVENOUS; SUBCUTANEOUS at 18:45

## 2023-01-01 RX ADMIN — Medication 1 EACH: at 19:00

## 2023-01-01 RX ADMIN — ONDANSETRON 4 MILLIGRAM(S): 8 TABLET, FILM COATED ORAL at 13:45

## 2023-01-01 RX ADMIN — DAPTOMYCIN 120 MILLIGRAM(S): 500 INJECTION, POWDER, LYOPHILIZED, FOR SOLUTION INTRAVENOUS at 04:58

## 2023-01-01 RX ADMIN — OXYCODONE HYDROCHLORIDE 5 MILLIGRAM(S): 5 TABLET ORAL at 15:24

## 2023-01-01 RX ADMIN — MORPHINE 6 MILLIGRAM(S): 10 SOLUTION ORAL at 06:51

## 2023-01-01 RX ADMIN — Medication 1000 MILLIGRAM(S): at 00:28

## 2023-01-01 RX ADMIN — HYDROMORPHONE HYDROCHLORIDE 0.5 MILLIGRAM(S): 2 INJECTION INTRAMUSCULAR; INTRAVENOUS; SUBCUTANEOUS at 15:25

## 2023-01-01 RX ADMIN — OXYCODONE HYDROCHLORIDE 5 MILLIGRAM(S): 5 TABLET ORAL at 00:35

## 2023-01-01 RX ADMIN — ENOXAPARIN SODIUM 60 MILLIGRAM(S): 100 INJECTION SUBCUTANEOUS at 00:26

## 2023-01-01 RX ADMIN — SODIUM CHLORIDE 750 MILLILITER(S): 9 INJECTION INTRAMUSCULAR; INTRAVENOUS; SUBCUTANEOUS at 22:59

## 2023-01-01 RX ADMIN — PIPERACILLIN AND TAZOBACTAM 25 GRAM(S): 4; .5 INJECTION, POWDER, LYOPHILIZED, FOR SOLUTION INTRAVENOUS at 16:02

## 2023-01-01 RX ADMIN — SODIUM CHLORIDE 500 MILLILITER(S): 9 INJECTION, SOLUTION INTRAVENOUS at 06:00

## 2023-01-01 RX ADMIN — MIRTAZAPINE 30 MILLIGRAM(S): 45 TABLET, ORALLY DISINTEGRATING ORAL at 22:17

## 2023-01-01 NOTE — PROGRESS NOTE ADULT - SUBJECTIVE AND OBJECTIVE BOX
General Surgery Progress Note    SUBJECTIVE:   Patient seen and examined at bedside by chief during AM rounds.  Patient reports he still has pain this morning. Denies fevers or chills, denies nausea; tolerating PO intake.     Vital Signs Last 24 Hrs  T(C): 36.7 (01 Jan 2023 05:01), Max: 38.1 (31 Dec 2022 09:49)  T(F): 98 (01 Jan 2023 05:01), Max: 100.6 (31 Dec 2022 09:49)  HR: 78 (01 Jan 2023 07:00) (73 - 94)  BP: 108/63 (01 Jan 2023 06:00) (85/50 - 124/68)  BP(mean): 79 (01 Jan 2023 06:00) (63 - 91)  RR: 17 (01 Jan 2023 07:00) (10 - 70)  SpO2: 96% (01 Jan 2023 07:00) (94% - 100%)    Parameters below as of 01 Jan 2023 02:00  Patient On (Oxygen Delivery Method): room air      Physical Exam:  General: Patient is lying in bed comfortably  Pulm: Nonlabored breathing, no respiratory distress  CV: Regular rate and rhythm  Abd:  soft, nontender, nondistended. No rebound, no guarding.             Incisions: clean, dry, intact and healing well, no erythema/induration/edema. Midline incision healing well. Ostomy noted in RLQ with liquid stool and gas - pink and patent  Extremities: warm, well perfused, SCDs in place, No significant edema appreciated    CBC Full  -  ( 31 Dec 2022 05:39 )  WBC Count : 7.28 K/uL  RBC Count : 3.35 M/uL  Hemoglobin : 9.7 g/dL  Hematocrit : 29.8 %  Platelet Count - Automated : 303 K/uL  Mean Cell Volume : 89.0 fl  Mean Cell Hemoglobin : 29.0 pg  Mean Cell Hemoglobin Concentration : 32.6 gm/dL  Auto Neutrophil # : 5.87 K/uL  Auto Lymphocyte # : 0.51 K/uL  Auto Monocyte # : 0.76 K/uL  Auto Eosinophil # : 0.13 K/uL  Auto Basophil # : 0.00 K/uL  Auto Neutrophil % : 71.9 %  Auto Lymphocyte % : 7.0 %  Auto Monocyte % : 10.5 %  Auto Eosinophil % : 1.8 %  Auto Basophil % : 0.0 %    12-31    135  |  104  |  11  ----------------------------<  97  3.6   |  25  |  0.75    Ca    8.1<L>      31 Dec 2022 17:53  Phos  2.4     12-31  Mg     1.9     12-31    TPro  5.9<L>  /  Alb  2.6<L>  /  TBili  1.9<H>  /  DBili  x   /  AST  23  /  ALT  20  /  AlkPhos  96  12-31    LIVER FUNCTIONS - ( 31 Dec 2022 17:53 )  Alb: 2.6 g/dL / Pro: 5.9 g/dL / ALK PHOS: 96 U/L / ALT: 20 U/L / AST: 23 U/L / GGT: x           CAPILLARY BLOOD GLUCOSE      POCT Blood Glucose.: 106 mg/dL (01 Jan 2023 06:06)  POCT Blood Glucose.: 130 mg/dL (31 Dec 2022 11:36)    Urinalysis Basic - ( 31 Dec 2022 13:52 )    Color: Yellow / Appearance: SL Cloudy / SG: >=1.030 / pH: x  Gluc: x / Ketone: NEGATIVE  / Bili: Moderate / Urobili: 1.0 E.U./dL   Blood: x / Protein: 30 mg/dL / Nitrite: POSITIVE   Leuk Esterase: NEGATIVE / RBC: < 5 /HPF / WBC < 5 /HPF   Sq Epi: x / Non Sq Epi: 0-5 /HPF / Bacteria: Present /HPF      PTT - ( 31 Dec 2022 05:39 )  PTT:65.4 sec

## 2023-01-01 NOTE — PROGRESS NOTE ADULT - ASSESSMENT
77M with PMHx of IVDU found unresponsive at his nursing home, resolved after Narcan in the field and brought to [facility]. Found to have PE, atrial flutter, and large LV thrombus on echo. Transferred to [facility] for further management. Pt c/o abdominal pain on 12/10 CTA showing mid SMA with embolus. Abnormal distal small bowel loops and cecum with dilatation and pneumatosis suggesting infarcted bowel. One or two tiny foci of intrahepatic portal vein pneumatosis. Segmental infarction upper pole left kidney. Now s/p Ex lap, SMA embolectomy, 80cm SBR, abthera vac left in discontinuity (12/11) and transferred to SICU intubated. S/p second look (12/13) and most recently s/p OR for 3rd look, end-to-end anastomosis of remaining bowel, loop ileostomy and abdomen closure (12/15). Remains in SICU, now extubated with still with limb ischemia to LLE pending amputation.     Neuro: Pain: IV Tylenol ATC  Psych: Depressed mood. No longer agitated or suicidal. Dronabinol for appetite.  CV: Septic shock--resolved; AFlutter: S/p Cardioversion on 12/30 Cont Amio 400 PO BID X 5 Days and Digoxin: Toprol XL 25 QD; TTE (12/7) CHF- Severe RV dysfxn and LVEF 15%, holding Spironolactone and Valsartan. LV thrombus w LLE limb ischemia- Heparin gtt  changed to SQL BID. ASA 81 held. S/p LORENZO: no thrombus in atrial appendage (12/29) s/p DCCV (12/30) now on PO amio 400 BID. Pt agreeable for AKA, plan for surgery at some point after cardioversion.   Pulm: on room air  GI: Reg/High fiber diet + ensures, metamucil BID, appetite improving w/ drobinol; PPI. Ileostomy high output--Cont imodium 4 TID cont Metamucil cont Tincture of Opium. Added Lomotil, Severe mal-nutrition - cont calorie ct cont vit c cont zinc cont MVI.   : Voids- Infarction of upper pole of Left Kidney, Renal US negative on 12/12. Uriarte.   ID: Off ABX // Dry gangrene no ABX.  Ischemic bowel: Zosyn (12/11-12/21), LLE wounds Vanc (12-19-21).   Heme: Hep C positive; Active T&S; Hg goal > 8  Endo: mISS, Hypoglycemia Resolved   PPx: SCD Therapeutic Lovenox 60 bid  Lines: PIVs // DC: Rt IJ TLC( 12/22-12/30)  Rt TLC (12/11-12/17), L radial lizette (12/11-12/20). R Lizette (--12/26)  Dispo: SICU, pending AKA

## 2023-01-01 NOTE — PROGRESS NOTE ADULT - ASSESSMENT
78yo M with no significant PMH/PSx admitted to cardiology service on 12/7 in the setting of severely reduced LV function 2/2 an LV thrombus. Pt now with several days of post-prandial abdominal pain and loose BMs, initially suspected to be 2/2 opioid withdrawal, however on evening of 12/10 had a large bloody BM. 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 Vascular surgery (already following) with plan for OR. General surgery consulted for possible operative assistance in the setting of bowel ischemia. Now POD2 s/p ex lap, SMA embolectomy and small bowel resection. Transferred to CCU on 12/21/22 for cardiac optimization and now transferred back to SICU 12/22/22 for bleeding hemodynamic instability. CTA performed demonstrating large hematoma in R abdomen, new hemoperitoneum, and bilateral pleural effusions. Now s/p cardioversion (12/31) and in normal sinus rhythm    Plan:  Monitor Ostomy output  Continue Imodium, opium tincture  Appreciate vascular surgery recs  Appreciate cardiology recs  LFD/IVF  Rest of Care per SICU team  Surgery Team 4C will continue to follow. Please page Team 4 with questions/clinical changes. [phone]

## 2023-01-01 NOTE — PROGRESS NOTE ADULT - SUBJECTIVE AND OBJECTIVE BOX
SUBJECTIVE: Patient seen and evaluated. No specific complaints this AM. Pain is well controlled. Tolerating minimal PO diet        MEDICATIONS  (STANDING):  aMIOdarone    Tablet 400 milliGRAM(s) Oral every 12 hours  ascorbic acid 500 milliGRAM(s) Oral daily  chlorhexidine 2% Cloths 1 Application(s) Topical <User Schedule>  dextrose 5%. 1000 milliLiter(s) (100 mL/Hr) IV Continuous <Continuous>  dextrose 5%. 1000 milliLiter(s) (50 mL/Hr) IV Continuous <Continuous>  diphenoxylate/atropine 2 Tablet(s) Oral daily  dronabinol 5 milliGRAM(s) Oral every 12 hours  enoxaparin Injectable 60 milliGRAM(s) SubCutaneous every 12 hours  glucagon  Injectable 1 milliGRAM(s) IntraMuscular once  influenza  Vaccine (HIGH DOSE) 0.7 milliLiter(s) IntraMuscular once  insulin lispro (ADMELOG) corrective regimen sliding scale   SubCutaneous every 6 hours  lactated ringers Bolus 1000 milliLiter(s) IV Bolus once  loperamide 4 milliGRAM(s) Oral three times a day  metoprolol succinate ER 25 milliGRAM(s) Oral daily  multivitamin 1 Tablet(s) Oral daily  opium Tincture 6 milliGRAM(s) Oral four times a day  pantoprazole    Tablet 40 milliGRAM(s) Oral two times a day  potassium chloride    Tablet ER 40 milliEquivalent(s) Oral once  psyllium Powder 1 Packet(s) Oral every 12 hours  silver sulfADIAZINE 1% Cream 1 Application(s) Topical daily  zinc sulfate 220 milliGRAM(s) Oral daily    MEDICATIONS  (PRN):  acetaminophen     Tablet .. 650 milliGRAM(s) Oral every 6 hours PRN Temp greater or equal to 38C (100.4F), Mild Pain (1 - 3)  dextrose Oral Gel 15 Gram(s) Oral once PRN Blood Glucose LESS THAN 70 milliGRAM(s)/deciliter      Vital Signs Last 24 Hrs  T(C): 36.7 (01 Jan 2023 05:01), Max: 37.9 (31 Dec 2022 13:25)  T(F): 98 (01 Jan 2023 05:01), Max: 100.3 (31 Dec 2022 13:25)  HR: 78 (01 Jan 2023 07:00) (73 - 94)  BP: 108/63 (01 Jan 2023 06:00) (85/50 - 124/68)  BP(mean): 79 (01 Jan 2023 06:00) (63 - 91)  RR: 17 (01 Jan 2023 07:00) (10 - 70)  SpO2: 96% (01 Jan 2023 07:00) (94% - 100%)    Parameters below as of 01 Jan 2023 02:00  Patient On (Oxygen Delivery Method): room air        Physical Exam:  General: NAD, flat affect   HEENT: NC/AT, EOMI, PERRLA, normal hearing,   Pulmonary: Nonlabored breathing, no respiratory distress,   Cardiovascular: NSR,  Abdominal: soft, NT/ND, midline lap site c/d/i, ostomy PPP w thin feculent output in stoma appliance   Extremities: dry gangrene LLE from knee down  Neuro: A/O x3, CNs II-XII grossly intact, absent sensation motor function in LLE  Pulses: R PT, absent L PT/DP    I&O's Summary    31 Dec 2022 07:01  -  01 Jan 2023 07:00  --------------------------------------------------------  IN: 2152 mL / OUT: 3400 mL / NET: -1248 mL        LABS:                        9.7    7.28  )-----------( 303      ( 31 Dec 2022 05:39 )             29.8     12-31    135  |  104  |  11  ----------------------------<  97  3.6   |  25  |  0.75    Ca    8.1<L>      31 Dec 2022 17:53  Phos  2.4     12-31  Mg     1.9     12-31    TPro  5.9<L>  /  Alb  2.6<L>  /  TBili  1.9<H>  /  DBili  x   /  AST  23  /  ALT  20  /  AlkPhos  96  12-31    PTT - ( 31 Dec 2022 05:39 )  PTT:65.4 sec  Urinalysis Basic - ( 31 Dec 2022 13:52 )    Color: Yellow / Appearance: SL Cloudy / SG: >=1.030 / pH: x  Gluc: x / Ketone: NEGATIVE  / Bili: Moderate / Urobili: 1.0 E.U./dL   Blood: x / Protein: 30 mg/dL / Nitrite: POSITIVE   Leuk Esterase: NEGATIVE / RBC: < 5 /HPF / WBC < 5 /HPF   Sq Epi: x / Non Sq Epi: 0-5 /HPF / Bacteria: Present /HPF      CAPILLARY BLOOD GLUCOSE      POCT Blood Glucose.: 106 mg/dL (01 Jan 2023 06:06)  POCT Blood Glucose.: 130 mg/dL (31 Dec 2022 11:36)    LIVER FUNCTIONS - ( 31 Dec 2022 17:53 )  Alb: 2.6 g/dL / Pro: 5.9 g/dL / ALK PHOS: 96 U/L / ALT: 20 U/L / AST: 23 U/L / GGT: x             RADIOLOGY & ADDITIONAL STUDIES:

## 2023-01-02 NOTE — PROGRESS NOTE ADULT - SUBJECTIVE AND OBJECTIVE BOX
General Surgery Progress Note    SUBJECTIVE:   Patient seen and examined at bedside by chief during AM rounds. Vitals reviewed and patient noted to be afebrile overnight with stable vital signs. Patient remains in normal sinus rhythm.  Overnight, patient noted to have Dobhoff feeding tube placed with CXR confirmation of placement. Patient reports pain is controlled, but mild discomfort from Dobhoff tube.     Vital Signs Last 24 Hrs  T(C): 37.3 (02 Jan 2023 05:00), Max: 37.4 (02 Jan 2023 01:00)  T(F): 99.1 (02 Jan 2023 05:00), Max: 99.4 (02 Jan 2023 01:00)  HR: 78 (02 Jan 2023 07:00) (69 - 104)  BP: 109/67 (02 Jan 2023 07:00) (98/61 - 129/68)  BP(mean): 84 (02 Jan 2023 07:00) (75 - 93)  RR: 19 (02 Jan 2023 07:00) (11 - 23)  SpO2: 97% (02 Jan 2023 07:00) (95% - 99%)    Parameters below as of 02 Jan 2023 07:00  Patient On (Oxygen Delivery Method): room air        I&O's Summary    01 Jan 2023 07:01  -  02 Jan 2023 07:00  --------------------------------------------------------  IN: 2170 mL / OUT: 2950 mL / NET: -780 mL        Physical Exam:  General: Resting comfortably in bed, NAD  HEENT: ATNC  Pulmonary: Equal chest wall expnasion b/l, no respiratory distress  Cardiovascular: NSR  Abdomen: Soft, nondistended, nontender. No rebound or guarding. Midline incision healing well without erythema or drainage. Ostomy noted in RLQ to be pink and patent with stool and gas in appliance  Extremities: Cachectic appearing. Dry gangrene noted in LLE from knee and distal    LABS:                        9.9    7.80  )-----------( 312      ( 02 Jan 2023 05:30 )             31.4     01-02    see note  |  see note  |  see note  ----------------------------<  see note  see note   |  see note  |  see note    Ca    see note      02 Jan 2023 05:30  Phos  see note     01-02  Mg     see note     01-02    TPro  5.7<L>  /  Alb  2.2<L>  /  TBili  1.2  /  DBili  0.6<H>  /  AST  22  /  ALT  17  /  AlkPhos  91  01-01      Urinalysis Basic - ( 31 Dec 2022 13:52 )    Color: Yellow / Appearance: SL Cloudy / SG: >=1.030 / pH: x  Gluc: x / Ketone: NEGATIVE  / Bili: Moderate / Urobili: 1.0 E.U./dL   Blood: x / Protein: 30 mg/dL / Nitrite: POSITIVE   Leuk Esterase: NEGATIVE / RBC: < 5 /HPF / WBC < 5 /HPF   Sq Epi: x / Non Sq Epi: 0-5 /HPF / Bacteria: Present /HPF

## 2023-01-02 NOTE — PROGRESS NOTE ADULT - SUBJECTIVE AND OBJECTIVE BOX
24 hr events:  ON: DHT placed per FF, CXR confirmed with rads 2300 lytes ok bolus 200 for ostomy output, started vital 1 TF via DHT  1/1: AM ileostomy 800cc, NS bolus 400cc x1. Lomotil added, RUQ us and Bili fractionation. Bolus 1L +300 at noon    SUBJECTIVE: refusing repeat labs after BMP was unable to be resulted for contamination    MEDICATIONS  (STANDING):  aMIOdarone    Tablet 400 milliGRAM(s) Oral every 12 hours  ascorbic acid 500 milliGRAM(s) Oral daily  chlorhexidine 2% Cloths 1 Application(s) Topical <User Schedule>  dextrose 5%. 1000 milliLiter(s) (50 mL/Hr) IV Continuous <Continuous>  dextrose 5%. 1000 milliLiter(s) (100 mL/Hr) IV Continuous <Continuous>  diphenoxylate/atropine 2 Tablet(s) Oral daily  dronabinol 5 milliGRAM(s) Oral every 12 hours  enoxaparin Injectable 60 milliGRAM(s) SubCutaneous every 12 hours  glucagon  Injectable 1 milliGRAM(s) IntraMuscular once  influenza  Vaccine (HIGH DOSE) 0.7 milliLiter(s) IntraMuscular once  insulin lispro (ADMELOG) corrective regimen sliding scale   SubCutaneous every 6 hours  loperamide 4 milliGRAM(s) Oral three times a day  metoprolol succinate ER 25 milliGRAM(s) Oral daily  multivitamin 1 Tablet(s) Oral daily  opium Tincture 6 milliGRAM(s) Oral four times a day  pantoprazole    Tablet 40 milliGRAM(s) Oral two times a day  potassium chloride    Tablet ER 40 milliEquivalent(s) Oral once  psyllium Powder 1 Packet(s) Oral every 12 hours  silver sulfADIAZINE 1% Cream 1 Application(s) Topical daily  zinc sulfate 220 milliGRAM(s) Oral daily    MEDICATIONS  (PRN):  acetaminophen     Tablet .. 650 milliGRAM(s) Oral every 6 hours PRN Temp greater or equal to 38C (100.4F), Mild Pain (1 - 3)  dextrose Oral Gel 15 Gram(s) Oral once PRN Blood Glucose LESS THAN 70 milliGRAM(s)/deciliter      Uriarte:   [x] None	[ ] Daily Uriarte Order Placed	   Indication:   [ ] Strict I and O's    [ ] Obstruction     [ ] Incontinence + Ulcer     [ ] Urologic issue  Central Line:   [x] None   [ ]  Medication / TPN   [ ] No Peripheral IV   [ ] Resuscitation  A line:   [x] None    [ ] HD monitoring    [ ] Lab draws  Drips: [ ] propofol   [ ] fentanyl   [ ] precedex  [ ] levophed  [ ] vasopressin  [ ] phenylephrine              [ ] heparin  [ ] protonix  [ ] other: ___  Restraints: [x] None    [ ] Daily Restraints Order Placed    ICU Vital Signs Last 24 Hrs  T(C): 37.3 (02 Jan 2023 05:00), Max: 37.4 (02 Jan 2023 01:00)  T(F): 99.1 (02 Jan 2023 05:00), Max: 99.4 (02 Jan 2023 01:00)  HR: 78 (02 Jan 2023 07:00) (69 - 104)  BP: 109/67 (02 Jan 2023 07:00) (98/61 - 129/68)  BP(mean): 84 (02 Jan 2023 07:00) (75 - 93)  ABP: --  ABP(mean): --  RR: 19 (02 Jan 2023 07:00) (11 - 23)  SpO2: 97% (02 Jan 2023 07:00) (95% - 99%)    O2 Parameters below as of 02 Jan 2023 07:00  Patient On (Oxygen Delivery Method): room air        Physical Exam:  General: NAD, flat affect   HEENT: NC/AT, EOMI, PERRLA, normal hearing,   Pulmonary: Nonlabored breathing, no respiratory distress,   Cardiovascular: NSR,  Abdominal: soft, NT/ND, midline lap site c/d/i, ostomy PPP w thin feculent output in stoma appliance   Extremities: dry gangrene LLE from knee down  Neuro: A/O x3, CNs II-XII grossly intact, absent sensation motor function in LLE  Pulses: R PT, absent L PT/DP      I&O's Summary    01 Jan 2023 07:01  -  02 Jan 2023 07:00  --------------------------------------------------------  IN: 2170 mL / OUT: 2950 mL / NET: -780 mL        LABS:                        9.9    7.80  )-----------( 312      ( 02 Jan 2023 05:30 )             31.4     01-02    BMP contaminated    TPro  5.7<L>  /  Alb  2.2<L>  /  TBili  1.2  /  DBili  0.6<H>  /  AST  22  /  ALT  17  /  AlkPhos  91  01-01      Urinalysis Basic - ( 31 Dec 2022 13:52 )    Color: Yellow / Appearance: SL Cloudy / SG: >=1.030 / pH: x  Gluc: x / Ketone: NEGATIVE  / Bili: Moderate / Urobili: 1.0 E.U./dL   Blood: x / Protein: 30 mg/dL / Nitrite: POSITIVE   Leuk Esterase: NEGATIVE / RBC: < 5 /HPF / WBC < 5 /HPF   Sq Epi: x / Non Sq Epi: 0-5 /HPF / Bacteria: Present /HPF      CAPILLARY BLOOD GLUCOSE  POCT Blood Glucose.: 101 mg/dL (02 Jan 2023 00:49)  POCT Blood Glucose.: 122 mg/dL (01 Jan 2023 19:44)  POCT Blood Glucose.: 108 mg/dL (01 Jan 2023 14:27)    LIVER FUNCTIONS - ( 01 Jan 2023 11:28 )  Alb: 2.2 g/dL / Pro: 5.7 g/dL / ALK PHOS: 91 U/L / ALT: 17 U/L / AST: 22 U/L / GGT: x               Micro:  Culture - Blood (12.31.22 @ 10:18)    Specimen Source: .Blood Blood-Peripheral    Culture Results:   No growth at 1 day.        Radiology & Additional Studies:   < from: Xray Chest 1 View-PORTABLE IMMEDIATE (Xray Chest 1 View-PORTABLE IMMEDIATE .) (01.01.23 @ 20:31) >  IMPRESSION:  Enteric tube with tip coiled in the left upper quadrant. No pneumothorax.

## 2023-01-02 NOTE — PROGRESS NOTE ADULT - SUBJECTIVE AND OBJECTIVE BOX
s: seen on rounds in SICU. Patient is somewhat withdrawn. Has no specific complaints. Is not complaining of pain. DHT in place.     o: ICU Vital Signs Last 24 Hrs  T(C): 37.3 (02 Jan 2023 05:00), Max: 37.4 (02 Jan 2023 01:00)  T(F): 99.1 (02 Jan 2023 05:00), Max: 99.4 (02 Jan 2023 01:00)  HR: 82 (02 Jan 2023 09:00) (69 - 104)  BP: 120/68 (02 Jan 2023 09:00) (98/61 - 129/68)  BP(mean): 85 (02 Jan 2023 09:00) (75 - 93)  ABP: --  ABP(mean): --  RR: 17 (02 Jan 2023 09:00) (11 - 23)  SpO2: 100% (02 Jan 2023 09:00) (95% - 100%)    O2 Parameters below as of 02 Jan 2023 09:00  Patient On (Oxygen Delivery Method): room air    Physical Exam  General: NAD, resting in bed. DHT in place.   Pulmonary: Nonlabored breathing, no respiratory distress  Cardiovascular: regular. rate  Abd: soft, ileostomy pink, ostomy bag with brown liquid output, midline incision with staples  Extremities: Toes of LLE w/ dry gangrene. Large bullae of anterior and lateral foot, blisters of anterior left calf have opened, dressed in kerlix with serous drainage from the desquamated patches. Diffuse mild edema of LLE. LLE cool. No signs of infection or wet gangrene. No L DP/PT signals      A/P    78 y/o M with Significant PMHx of IVDU found unresponsive at his nursing home, resolved after Narcan in the field, and brought to [facility]. Found to have PE, atrial flutter, and large LV thrombus on echo. Transferred to [facility] for further management. Pt C/o abdominal pain on 12/10 CTA showing mid SMA with embolus. Abnormal distal small bowel loops and cecum with dilatation and pneumatosis suggesting infarcted bowel. One or two tiny foci of  intrahepatic portal vein pneumatosis. Segmental infarction upper pole left kidney. Now s/p Ex lap, SMA embolectomy, 80cm SBR, abthera vac left in discontinuity (12/11) and transferred to SICU intubated. S/p second look (12/13) and most recently s/p OR for 3rd look, end-to-end anastomosis of remaining bowel, loop ileostomy and abdomen closure (12/15). Remains in SICU now extubated with limb ischemia to LLE pending amputation. While the ischemia has not yet fully demarcated, it is clear that the posterior calf (which is needed to heal a BKA flap) is involved and therefore only surgical option available to the patient is an AKA, planning for likely this week.     Plan:   - Daily dressing changes by VSU. Bedside RN may change dressings PRN for staturation.   - Planning for AKA, likely this week.   - Continue local wound care with silvadene to blisters and kerlex  - Rest of care per SICU  - Vascular surgery Team 3C will continue to follow. Please call [phone] with any questions or concerns.

## 2023-01-02 NOTE — PROGRESS NOTE ADULT - ASSESSMENT
77M with PMHx of IVDU found unresponsive at his nursing home, resolved after Narcan in the field and brought to [facility]. Found to have PE, atrial flutter, and large LV thrombus on echo. Transferred to [facility] for further management. Pt c/o abdominal pain on 12/10 CTA showing mid SMA with embolus. Abnormal distal small bowel loops and cecum with dilatation and pneumatosis suggesting infarcted bowel. One or two tiny foci of intrahepatic portal vein pneumatosis. Segmental infarction upper pole left kidney. Now s/p Ex lap, SMA embolectomy, 80cm SBR, abthera vac left in discontinuity (12/11) and transferred to SICU intubated. S/p second look (12/13) and most recently s/p OR for 3rd look, end-to-end anastomosis of remaining bowel, loop ileostomy and abdomen closure (12/15). Remains in SICU, now extubated with still with limb ischemia to LLE pending amputation.     Neuro: Pain: IV Tylenol ATC  Psych: Depressed mood. No longer agitated or suicidal. Dronabinol for appetite.  CV: Septic shock--resolved; AFlutter: S/p Cardioverion on 12/30 Cont Amio 400 PO BID X 5 Days and Digoxin: Toprol XL 25 QD; TTE (12/7) CHF- Severe RV dysfxn and LVEF 15%, holding Spironolactone and Valsartan. LV thrombus w LLE limb ischemia- Heparin gtt  changed to SQL BID. ASA 81 held. S/p LORENZO: no thrombus in atrial appendage (12/29) s/p DCCV (12/30) now on PO amio 400 BID. Pt agreeable for AKA, plan for surgery at some point after cardioconversion.   Pulm: on room air  GI: Reg/High fiber diet + ensures, DHT placed for TF, metamucil BID, appetite improving w/ drobinol; PPI. Ileostomy high output--Cont imodium 4 TID cont Metamucil cont Tincutire of Opium. Lomotil, Severe mal-nutrition - cont calorie ct cont vit c cont zinc cont MVI.   : Voids- Infarction of upper pole of Left Kidney, Renal US negative on 12/12. Uriarte.   ID: Off ABX // Dry gangrene no ABX.  Ischemic bowel: Zosyn (12/11-12/21), LLE wounds Vanc (12-19-21).   Heme: Hep C positive; Active T&S; Hg goal > 8  Endo: mISS, Hypoglycemia Resolved   PPx: SCD Therapeutic Lovenox 60 bid  Lines: PIVs // DC: Rt IJ TLC( 12/22-12/30)  Rt TLC (12/11-12/17), L radial connie (12/11-12/20). R Reading (--12/26)  Dispo: SICU, pending AKA    Will follow up feed plan with surgical team  Monitoring ostomy output, replete 1:2  F/u timing for OR w/ vascular surgery   Will attempt to get labs later given pt refusal currently

## 2023-01-02 NOTE — PROGRESS NOTE ADULT - ASSESSMENT
76yo M with no significant PMH/PSx admitted to cardiology service on 12/7 in the setting of severely reduced LV function 2/2 an LV thrombus. Pt now with several days of post-prandial abdominal pain and loose BMs, initially suspected to be 2/2 opioid withdrawal, however on evening of 12/10 had a large bloody BM. 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 Vascular surgery (already following) with plan for OR. General surgery consulted for possible operative assistance in the setting of bowel ischemia. Now POD2 s/p ex lap, SMA embolectomy and small bowel resection. Transferred to CCU on 12/21/22 for cardiac optimization and now transferred back to SICU 12/22/22 for bleeding hemodynamic instability. CTA performed demonstrating large hematoma in R abdomen, new hemoperitoneum, and bilateral pleural effusions. Now s/p cardioversion (12/31) and in normal sinus rhythm. Awaiting vascular surgery planning for AKA.    Plan:  Monitor Ostomy output. Continue with 1:2 repletion of ileostomy output  Continue Imodium, opium tincture for high ileostomy output  Appreciate vascular surgery recs regarding AKA planning  Appreciate cardiology recs  Continue therapeutic lovenox  LFD with Tube feeds. Tube feeds to goal as tolerated  Rest of Care per SICU team  Surgery Team 4C will continue to follow. Please page Team 4 with questions/clinical changes. [phone]

## 2023-01-03 NOTE — PROGRESS NOTE ADULT - PROBLEM SELECTOR PLAN 1
EF 10-15%, LVIDD 5. Severely reduced RV function.   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 Will reintroduce further GDMT post op  Diuretics: CVP is low (2-3) s/p albumin; hold off diuresis

## 2023-01-03 NOTE — PROGRESS NOTE ADULT - PROBLEM SELECTOR PLAN 4
Hospital course complicated by acute GI bleed, requiring intermittent pressor support as well as multiple transfusions  -now with stable H/H while on lovenox. s/p 1u pRBC on 12/27, with hgb improving to 9-10 and remains stable while on AC; continue to monitor

## 2023-01-03 NOTE — CHART NOTE - NSCHARTNOTEFT_GEN_A_CORE
Admitting Diagnosis:   Patient is a 77y old  Male who presents with a chief complaint of A flutter (2023 12:51)      PAST MEDICAL & SURGICAL HISTORY:      Current Nutrition Order:  Regular HIGH fiber Diet, Ensure MAX x3   --Team reports high fiber diet given High OP from ostomy   EN: Jevity 1.2 @10ml/hr x24hrs via NGT 1/3     PO Intake: Good (%) [   ]  Fair (50-75%) [   ] Poor (<25%) [ X  ]  --Marinol is now ordered      GI Issues:   Ostomy: 1/3 1250ml,  2575ml,  1850ml,  2957ml   Imodium , metamucil , and Lomotil 1/3 are ordered     Pain:  No Pain per flow sheets  Pain meds ordered      Skin Integrity:  Buddy 12, +2BL Leg Edema   Sacrum unstageable pressure ulcer, midline spine stage III pressure ulcer  Skin tears noted as well     Labs:       137  |  107  |  14  ----------------------------<  103<H>  4.1   |  21<L>  |  0.68    Ca    8.5      2023 05:18  Phos  2.2     -  Mg     2.0     -    TPro  6.5  /  Alb  2.9<L>  /  TBili  1.3<H>  /  DBili  0.6<H>  /  AST  42<H>  /  ALT  32  /  AlkPhos  117  -    CAPILLARY BLOOD GLUCOSE      POCT Blood Glucose.: 127 mg/dL (2023 10:56)  POCT Blood Glucose.: 95 mg/dL (2023 21:32)  POCT Blood Glucose.: 101 mg/dL (2023 16:51)      Medications:  MEDICATIONS  (STANDING):  aMIOdarone    Tablet 400 milliGRAM(s) Oral every 12 hours  ascorbic acid 500 milliGRAM(s) Oral daily  chlorhexidine 2% Cloths 1 Application(s) Topical <User Schedule>  dextrose 5%. 1000 milliLiter(s) (50 mL/Hr) IV Continuous <Continuous>  dextrose 5%. 1000 milliLiter(s) (100 mL/Hr) IV Continuous <Continuous>  diphenoxylate/atropine 2 Tablet(s) Oral two times a day  dronabinol 5 milliGRAM(s) Oral every 12 hours  enoxaparin Injectable 60 milliGRAM(s) SubCutaneous every 12 hours  glucagon  Injectable 1 milliGRAM(s) IntraMuscular once  influenza  Vaccine (HIGH DOSE) 0.7 milliLiter(s) IntraMuscular once  insulin lispro (ADMELOG) corrective regimen sliding scale   SubCutaneous every 6 hours  loperamide 4 milliGRAM(s) Oral three times a day  metoprolol succinate ER 25 milliGRAM(s) Oral daily  multivitamin 1 Tablet(s) Oral daily  opium Tincture 6 milliGRAM(s) Oral four times a day  pantoprazole    Tablet 40 milliGRAM(s) Oral two times a day  potassium chloride    Tablet ER 40 milliEquivalent(s) Oral once  psyllium Powder 1 Packet(s) Oral every 12 hours  silver sulfADIAZINE 1% Cream 1 Application(s) Topical daily  zinc sulfate 220 milliGRAM(s) Oral daily    MEDICATIONS  (PRN):  acetaminophen     Tablet .. 650 milliGRAM(s) Oral every 6 hours PRN Temp greater or equal to 38C (100.4F), Mild Pain (1 - 3)  dextrose Oral Gel 15 Gram(s) Oral once PRN Blood Glucose LESS THAN 70 milliGRAM(s)/deciliter  oxyCODONE    IR 5 milliGRAM(s) Oral every 4 hours PRN Severe Pain (7 - 10)        6'6''  pounds +-10%  Wt 142 pounds BMI 16.4 %IBW=66%     Weight Change:    141.9 pounds - dosing wt    136 pounds - Bedscale wt     **PCM:  >> Reports having 1-2 meals/day + ONS. Per pt claims to have 2 ensure/day and 2 nutrament/day - unclear how accurate this is. ?If pt meeting ~75% EER consistently.  >> Does report wt loss.  pounds x6 months ago. Thinks he now is 135 pounds which is consistent with bedscale wt obtained during initial visit by  pounds. Suggest wt loss of 49 pounds/26% body wt loss.  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     Estimated energy needs:  Current body wt for EER based on clinician judgment   Adjust for age, malnutrition, EF, S/p OR, Pressure ulcer; fluids per team  25-30kcal/k-1950kcal/day   1.3-1.5gm/k-98gm prot/day   **Rec upper end of needs   **Will adjust s/p AKA     Subjective:  77M with PMHx of IVDU found unresponsive at his nursing home, resolved after Narcan in the field and brought to [facility]. Found to have PE, atrial flutter, and large LV thrombus on echo. Transferred to [facility] for further management. Pt c/o abdominal pain on 12/10 CTA showing mid SMA with embolus. Abnormal distal small bowel loops and cecum with dilatation and pneumatosis suggesting infarcted bowel. One or two tiny foci of intrahepatic portal vein pneumatosis. Segmental infarction upper pole left kidney. Now s/p Ex lap, SMA embolectomy, 80cm SBR, abthera vac left in discontinuity () and transferred to SICU intubated. S/p second look () and most recently s/p OR for 3rd look, end-to-end anastomosis of remaining bowel, loop ileostomy and abdomen closure (12/15). Transferred to CCU on  for cardiac optimization and now transferred back to SICU  for bleeding hemodynamic instability. Echo  shows EF 10-15% with overall hypokinesis. CTA performed demonstrating large hematoma in R abdomen, new hemoperitoneum, and bilateral pleural effusions. Remains in SICU, now extubated with still with limb ischemia to LLE pending amputation and AC held given BRBPR and hematoma; noted pt agreeable for AKA when feasible - AKA currently Pending Cards. Pt s/p DCCV on  (negative LORENZO on ) with sucessful conversion to NSR.    Pt seen on [facility], Under SICU team. Pt soundly sleeping. Pt remains ordered for PO diet + EN at this time via NGT. RN reports poor PO remains. Pt willing to eat oatmeal/ensure. Pt with EN in setting of prolonged inadequate intake/diet during admit. EN order from , however RN reports pt refused EN most of the day on . Seen this AM running at 10ml/hr. Please note pt remains with OP from ostomy prior to use of EN. ? Need for PN>EN at this time. However Team asking for goal rate for EN - reports if pt does not tolerate EN may consider use of PN.   Labs: Low HH, BUN/Cr WDL, AST SGOT 42, Na K Mg WDL, Phos 2.2, POCT 95 101 91, .   Please see below for nutritions recommendations. Discussed pt with SICU team.    Prior PES: Malnutrition Severe in Chronic state RT presumed intake<EER AEB NFPE, wt loss, PO intake  >>on going  Goal: Pt will meet at least 75% of nutrient needs via feasible route / Show no further s/s of malnutrition    Recommendations:  1. As medically feasible and tolerated, PO diet: Realgar diet, ensure MAX x3/day 150kcal/30gm prot per 1 can.   2. Monitor Diet tolerance, %PO intake. As feasible, Cont with appetite stimulant.    3. Would STRONGLY consider use of alternate means of nutrition given prolonged inadequate intake/diet during admit, in pt who is malnourished with Skin Breakdown, high Ostomy OP who is now Pending further OR interventions.     -- Team asking for EN Recs: Vital 1.5 @45ml/hr x24hrs will provide ~1080ml, 1620kcal, 73gm prot, 825ml water. This will meet ~99% lower end kcal needs and 86% lower end prot needs. Start at 10ml/hr, increase 10ml q8hrs or as tolerated. Check lytes and replete prior to starting feeds. Can consider thiamine use in setting of malnutrition.   -- Closely monitor for tolerance. Should pt cont with high Ostomy OP and PN to begin please reconsult for Recs.   4. Monitor Skin, Wts daily, GOC. Pain/GI per team.   5. Labs: monitor BMP, CBC, glucose, lytes - Replete PRN, trend renal indices, LFts, POCT.  6. RD to remain available for additional Recs.      Education: NA    Risk Level: High [X   ] Moderate [   ] Low [   ].

## 2023-01-03 NOTE — PROGRESS NOTE ADULT - ATTENDING COMMENTS
76 YO M with a history of cocaine/opiate abuse and resident of a nursing facility who [place] [facility] [place] that was alleviated with narcan and found to be in rapid atrial flutter with severe LV dysfunction with mobile LV thrombus and subsegemental PE prompting transfer to [facility] on 12/7. He was also found to have limb ischemia with arterial thrombosis of LLE vessels. He was being considered for rhythm control when he developed abdominal pain and found to have acute mesenteric ischemia and underwent emergent bowel resection and SMA thrombectomy. His LV thrombus us no longer apparent on TTE and has likely embolized.He had a prolonged open abdomen which is now closed with placement of ostomy. His course has been further complicated by hemodynamically significant GI bleeding. He underwent LORENZO/DCCV on 12/30 and is in sinus rhythym. He is euvolemic on exam. He is awaiting AKA.    REVIEW OF STUDIES  TTE: LV 5.0 cm, LVEF 10-15% with global hypokinesis, 2.5 cm mobile LV thrombus, severe RV dysfunction  LORENZO: no ISIAH thrombus    PLAN    # Acute systolic heart failure  - Etiology is possibly tachycardia induced and now s/p DCCV.. ischemic evaluation as outpatient if LVEF remains reduced pending clinical status  - GDMT: continue metoprolol succinate 25. will add RAASi (likely Entresto) and MRA after surgery   - Euvolemic/dry off diuretics with high ostomy output   - Device premature at this time  - He is DNR/DNI    # Atrial flutter  - EP following, now s/p DCCV 12/30  - continue uninterrupted a/c, favor DOAC before discharge   - on amiodarone per EP     # Acute limb ischemia and mesenteric ischemia from LV thrombus  - management per surgery/vascular, currently awaiting AKA   - continue anticoagulation

## 2023-01-03 NOTE — PROGRESS NOTE ADULT - PROBLEM SELECTOR PLAN 2
RESOLVED  - transitioned from heparin gtt to lovenox, no signs of acute bleed  - LORENZO on 12/29 negative for thrombus  - patient s/p DCCV on 12/30 with successful conversion to NSR in 90s  - Per EP, d/c digoxin and transition to PO Amiodarone    Patient now pending AKA, however recommend minimal interruption in AC prior to procedure as patient is now s/p DCCV and increased risk for atrial stunning

## 2023-01-03 NOTE — PROGRESS NOTE ADULT - SUBJECTIVE AND OBJECTIVE BOX
INTERVAL/OVERNIGHT EVENTS:  There were no acute overnight events.  Patient continues to have high ileostomy output (~1L overnight).    SUBJECTIVE: Patient endorses exhaustion, sleep deprivation, sacral discomfort and mild abdominal pain when touched.  He denies nausea and vomiting; tolerating his diet.        Neurologic Medications  acetaminophen     Tablet .. 650 milliGRAM(s) Oral every 6 hours PRN Temp greater or equal to 38C (100.4F), Mild Pain (1 - 3)  dronabinol 5 milliGRAM(s) Oral every 12 hours  opium Tincture 6 milliGRAM(s) Oral four times a day    Respiratory Medications    Cardiovascular Medications  aMIOdarone    Tablet 400 milliGRAM(s) Oral every 12 hours  metoprolol succinate ER 25 milliGRAM(s) Oral daily    Gastrointestinal Medications  ascorbic acid 500 milliGRAM(s) Oral daily  dextrose 5%. 1000 milliLiter(s) IV Continuous <Continuous>  dextrose 5%. 1000 milliLiter(s) IV Continuous <Continuous>  diphenoxylate/atropine 2 Tablet(s) Oral daily  loperamide 4 milliGRAM(s) Oral three times a day  multivitamin 1 Tablet(s) Oral daily  pantoprazole    Tablet 40 milliGRAM(s) Oral two times a day  potassium chloride    Tablet ER 40 milliEquivalent(s) Oral once  psyllium Powder 1 Packet(s) Oral every 12 hours  sodium phosphate 15 milliMole(s)/250 mL IVPB 15 milliMole(s) IV Intermittent once  zinc sulfate 220 milliGRAM(s) Oral daily    Genitourinary Medications    Hematologic/Oncologic Medications  enoxaparin Injectable 60 milliGRAM(s) SubCutaneous every 12 hours  influenza  Vaccine (HIGH DOSE) 0.7 milliLiter(s) IntraMuscular once    Antimicrobial/Immunologic Medications    Endocrine/Metabolic Medications  dextrose Oral Gel 15 Gram(s) Oral once PRN Blood Glucose LESS THAN 70 milliGRAM(s)/deciliter  glucagon  Injectable 1 milliGRAM(s) IntraMuscular once  insulin lispro (ADMELOG) corrective regimen sliding scale   SubCutaneous every 6 hours    Topical/Other Medications  chlorhexidine 2% Cloths 1 Application(s) Topical <User Schedule>  silver sulfADIAZINE 1% Cream 1 Application(s) Topical daily      MEDICATIONS  (PRN):  acetaminophen     Tablet .. 650 milliGRAM(s) Oral every 6 hours PRN Temp greater or equal to 38C (100.4F), Mild Pain (1 - 3)  dextrose Oral Gel 15 Gram(s) Oral once PRN Blood Glucose LESS THAN 70 milliGRAM(s)/deciliter      I&O's Detail    01 Jan 2023 07:01  -  02 Jan 2023 07:00  --------------------------------------------------------  IN:    Enteral Tube Flush: 120 mL    IV PiggyBack: 100 mL    IV PiggyBack: 250 mL    Lactated Ringers Bolus: 200 mL    Lactated Ringers Bolus: 1500 mL  Total IN: 2170 mL    OUT:    Ileostomy (mL): 1650 mL    Voided (mL): 1300 mL  Total OUT: 2950 mL    Total NET: -780 mL      02 Jan 2023 07:01  -  03 Jan 2023 06:57  --------------------------------------------------------  IN:    Enteral Tube Flush: 50 mL    Jevity 1.2: 80 mL    Lactated Ringers Bolus: 750 mL    Lactated Ringers Bolus: 500 mL    Oral Fluid: 1550 mL  Total IN: 2930 mL    OUT:    Ileostomy (mL): 2450 mL    Voided (mL): 600 mL  Total OUT: 3050 mL    Total NET: -120 mL          Vital Signs Last 24 Hrs  T(C): 36.9 (03 Jan 2023 05:19), Max: 37.5 (02 Jan 2023 17:06)  T(F): 98.5 (03 Jan 2023 05:19), Max: 99.5 (02 Jan 2023 17:06)  HR: 88 (03 Jan 2023 05:00) (76 - 102)  BP: 114/69 (03 Jan 2023 05:00) (93/69 - 120/68)  BP(mean): 85 (03 Jan 2023 05:00) (72 - 86)  RR: 20 (03 Jan 2023 05:00) (9 - 61)  SpO2: 100% (03 Jan 2023 05:00) (97% - 100%)    Parameters below as of 03 Jan 2023 05:00  Patient On (Oxygen Delivery Method): room air        GENERAL: NAD, resting comfortably in bed, depressed/frustrated  HEENT: NCAT, MMM, normal hearing  C/V: Normal rate, normal peripheral perfusion  PULM: Nonlabored breathing on room air, no respiratory distress  ABD: Soft, ND, minimal tenderness to palpation, no rebound tenderness, no guarding; midline incision closed with staples healing well without exudate; R abdominal ostomy with thin liquid green stool high output in the stoma  EXTREM: LLE with dry gangrene from the knee down  SKIN:  midline laparotomy site is clean, dry and intact  NEURO: No focal deficits    LABS:                        9.6    7.29  )-----------( 252      ( 03 Jan 2023 05:18 )             30.1     01-03    137  |  107  |  14  ----------------------------<  103<H>  4.1   |  21<L>  |  0.68    Ca    8.5      03 Jan 2023 05:18  Phos  2.2     01-03  Mg     2.0     01-03    TPro  6.5  /  Alb  2.9<L>  /  TBili  1.3<H>  /  DBili  0.6<H>  /  AST  42<H>  /  ALT  32  /  AlkPhos  117  01-02          RADIOLOGY & ADDITIONAL STUDIES:      Culture - Blood (collected 12-31-22 @ 10:18)  Source: .Blood Blood-Peripheral  Preliminary Report (01-02-23 @ 15:00):    No growth at 2 days.

## 2023-01-03 NOTE — PROGRESS NOTE ADULT - SUBJECTIVE AND OBJECTIVE BOX
SUBJECTIVE: Patient seen and evaluated.        MEDICATIONS  (STANDING):  aMIOdarone    Tablet 400 milliGRAM(s) Oral every 12 hours  ascorbic acid 500 milliGRAM(s) Oral daily  chlorhexidine 2% Cloths 1 Application(s) Topical <User Schedule>  dextrose 5%. 1000 milliLiter(s) (50 mL/Hr) IV Continuous <Continuous>  dextrose 5%. 1000 milliLiter(s) (100 mL/Hr) IV Continuous <Continuous>  diphenoxylate/atropine 2 Tablet(s) Oral daily  dronabinol 5 milliGRAM(s) Oral every 12 hours  enoxaparin Injectable 60 milliGRAM(s) SubCutaneous every 12 hours  glucagon  Injectable 1 milliGRAM(s) IntraMuscular once  influenza  Vaccine (HIGH DOSE) 0.7 milliLiter(s) IntraMuscular once  insulin lispro (ADMELOG) corrective regimen sliding scale   SubCutaneous every 6 hours  loperamide 4 milliGRAM(s) Oral three times a day  metoprolol succinate ER 25 milliGRAM(s) Oral daily  multivitamin 1 Tablet(s) Oral daily  opium Tincture 6 milliGRAM(s) Oral four times a day  pantoprazole    Tablet 40 milliGRAM(s) Oral two times a day  potassium chloride    Tablet ER 40 milliEquivalent(s) Oral once  psyllium Powder 1 Packet(s) Oral every 12 hours  silver sulfADIAZINE 1% Cream 1 Application(s) Topical daily  zinc sulfate 220 milliGRAM(s) Oral daily    MEDICATIONS  (PRN):  acetaminophen     Tablet .. 650 milliGRAM(s) Oral every 6 hours PRN Temp greater or equal to 38C (100.4F), Mild Pain (1 - 3)  dextrose Oral Gel 15 Gram(s) Oral once PRN Blood Glucose LESS THAN 70 milliGRAM(s)/deciliter      Vital Signs Last 24 Hrs  T(C): 36.9 (03 Jan 2023 05:19), Max: 37.5 (02 Jan 2023 17:06)  T(F): 98.5 (03 Jan 2023 05:19), Max: 99.5 (02 Jan 2023 17:06)  HR: 83 (03 Jan 2023 07:00) (76 - 102)  BP: 117/64 (03 Jan 2023 07:00) (93/69 - 120/68)  BP(mean): 84 (03 Jan 2023 07:00) (72 - 86)  RR: 21 (03 Jan 2023 07:00) (9 - 61)  SpO2: 99% (03 Jan 2023 07:00) (99% - 100%)    Parameters below as of 03 Jan 2023 07:00  Patient On (Oxygen Delivery Method): room air        Physical Exam:  General: Resting comfortably in bed, NAD  HEENT: ATNC  Pulmonary: Equal chest wall expnasion b/l, no respiratory distress  Cardiovascular: NSR  Abdomen: Soft, nondistended, nontender. No rebound or guarding. Midline incision healing well without erythema or drainage. Ostomy noted in RLQ to be pink and patent with stool and gas in appliance  Extremities: Cachectic appearing. Dry gangrene noted in LLE from knee and distal    I&O's Summary    02 Jan 2023 07:01  -  03 Jan 2023 07:00  --------------------------------------------------------  IN: 2940 mL / OUT: 3725 mL / NET: -785 mL    03 Jan 2023 07:01  -  03 Jan 2023 07:56  --------------------------------------------------------  IN: 10 mL / OUT: 0 mL / NET: 10 mL        LABS:                        9.6    7.29  )-----------( 252      ( 03 Jan 2023 05:18 )             30.1     01-03    137  |  107  |  14  ----------------------------<  103<H>  4.1   |  21<L>  |  0.68    Ca    8.5      03 Jan 2023 05:18  Phos  2.2     01-03  Mg     2.0     01-03    TPro  6.5  /  Alb  2.9<L>  /  TBili  1.3<H>  /  DBili  0.6<H>  /  AST  42<H>  /  ALT  32  /  AlkPhos  117  01-02        CAPILLARY BLOOD GLUCOSE      POCT Blood Glucose.: 95 mg/dL (02 Jan 2023 21:32)  POCT Blood Glucose.: 101 mg/dL (02 Jan 2023 16:51)  POCT Blood Glucose.: 91 mg/dL (02 Jan 2023 11:07)    LIVER FUNCTIONS - ( 02 Jan 2023 12:21 )  Alb: 2.9 g/dL / Pro: 6.5 g/dL / ALK PHOS: 117 U/L / ALT: 32 U/L / AST: 42 U/L / GGT: x             RADIOLOGY & ADDITIONAL STUDIES:

## 2023-01-03 NOTE — PROGRESS NOTE ADULT - SUBJECTIVE AND OBJECTIVE BOX
OVERNIGHT EVENTS: MAYO    SUBJECTIVE:  Patient seen and examined at bedside. Denies shortness of breath, chest pain, abdominal pain, n/v.    Vital Signs Last 12 Hrs  T(F): 98.7 (01-03-23 @ 12:00), Max: 99 (01-03-23 @ 00:56)  HR: 80 (01-03-23 @ 12:00) (76 - 89)  BP: 101/61 (01-03-23 @ 12:00) (90/51 - 117/64)  BP(mean): 76 (01-03-23 @ 12:00) (64 - 85)  RR: 15 (01-03-23 @ 12:00) (9 - 34)  SpO2: 100% (01-03-23 @ 12:00) (99% - 100%)  I&O's Summary    02 Jan 2023 07:01  -  03 Jan 2023 07:00  --------------------------------------------------------  IN: 2940 mL / OUT: 3725 mL / NET: -785 mL    03 Jan 2023 07:01  -  03 Jan 2023 12:51  --------------------------------------------------------  IN: 217.5 mL / OUT: 375 mL / NET: -157.5 mL      PHYSICAL EXAM:  Constitutional: NAD, comfortable in bed. speaking in full sentences on RA  HEENT: NC/AT, MMM  Neck: Supple, no JVD  Respiratory: CTA B/L. No w/r/r.   Cardiovascular: RRR, normal S1 and S2, no m/r/g.   Gastrointestinal: +BS, soft NTND, +ostomy  Extremities: RLE warm with no edema. LLE cool and necrotic appearing        LABS:                        9.6    7.29  )-----------( 252      ( 03 Jan 2023 05:18 )             30.1     01-03    137  |  107  |  14  ----------------------------<  103<H>  4.1   |  21<L>  |  0.68    Ca    8.5      03 Jan 2023 05:18  Phos  2.2     01-03  Mg     2.0     01-03    TPro  6.5  /  Alb  2.9<L>  /  TBili  1.3<H>  /  DBili  0.6<H>  /  AST  42<H>  /  ALT  32  /  AlkPhos  117  01-02            RADIOLOGY & ADDITIONAL TESTS:    MEDICATIONS  (STANDING):  aMIOdarone    Tablet 400 milliGRAM(s) Oral every 12 hours  ascorbic acid 500 milliGRAM(s) Oral daily  chlorhexidine 2% Cloths 1 Application(s) Topical <User Schedule>  dextrose 5%. 1000 milliLiter(s) (50 mL/Hr) IV Continuous <Continuous>  dextrose 5%. 1000 milliLiter(s) (100 mL/Hr) IV Continuous <Continuous>  diphenoxylate/atropine 2 Tablet(s) Oral two times a day  dronabinol 5 milliGRAM(s) Oral every 12 hours  enoxaparin Injectable 60 milliGRAM(s) SubCutaneous every 12 hours  glucagon  Injectable 1 milliGRAM(s) IntraMuscular once  influenza  Vaccine (HIGH DOSE) 0.7 milliLiter(s) IntraMuscular once  insulin lispro (ADMELOG) corrective regimen sliding scale   SubCutaneous every 6 hours  loperamide 4 milliGRAM(s) Oral three times a day  metoprolol succinate ER 25 milliGRAM(s) Oral daily  multivitamin 1 Tablet(s) Oral daily  opium Tincture 6 milliGRAM(s) Oral four times a day  pantoprazole    Tablet 40 milliGRAM(s) Oral two times a day  potassium chloride    Tablet ER 40 milliEquivalent(s) Oral once  psyllium Powder 1 Packet(s) Oral every 12 hours  silver sulfADIAZINE 1% Cream 1 Application(s) Topical daily  zinc sulfate 220 milliGRAM(s) Oral daily    MEDICATIONS  (PRN):  acetaminophen     Tablet .. 650 milliGRAM(s) Oral every 6 hours PRN Temp greater or equal to 38C (100.4F), Mild Pain (1 - 3)  dextrose Oral Gel 15 Gram(s) Oral once PRN Blood Glucose LESS THAN 70 milliGRAM(s)/deciliter  oxyCODONE    IR 5 milliGRAM(s) Oral every 4 hours PRN Severe Pain (7 - 10)

## 2023-01-03 NOTE — PROGRESS NOTE ADULT - ASSESSMENT
76 y/o M with no significant PMHx found unresponsive at his nursing home, resolved after narcan in the field, and brought to [facility]. Found to have LV dysfunction (Tachy mediated), sub-segmental PE, atrial flutter, and large LV thrombus on echo. Transferred to [facility] for further management. Course complicated by acute mesenteric ischemia now s/p embolectomy and bowel resection, septic shock, LLE arterial occlusion.  he was recovering well, tolerating the introduction of GDMT and plan was for cardioversion, however, he had another episode of hira hematochezia requiring transfusions and pressors. He has since been weaned off pressors and bleeding has stopped. Patient now tolerating heparin gtt (stable hemoglobin) and is s/p DCCV on 12/30 (negative LORENZO on 12/29) with sucessful conversion to NSR. Now pending AKA this week.

## 2023-01-03 NOTE — PROGRESS NOTE ADULT - ASSESSMENT
77M with PMHx of IVDU found unresponsive at his nursing home, resolved after Narcan in the field and brought to [facility]. Found to have PE, atrial flutter, and large LV thrombus on echo. Transferred to [facility] for further management. Pt c/o abdominal pain on 12/10 CTA showing mid SMA with embolus. Abnormal distal small bowel loops and cecum with dilatation and pneumatosis suggesting infarcted bowel. One or two tiny foci of intrahepatic portal vein pneumatosis. Segmental infarction upper pole left kidney. Now s/p Ex lap, SMA embolectomy, 80cm SBR, abthera vac left in discontinuity (12/11) and transferred to SICU intubated. S/p second look (12/13) and most recently s/p OR for 3rd look, end-to-end anastomosis of remaining bowel, loop ileostomy and abdomen closure (12/15). Remains in SICU, now extubated still with limb ischemia and dry gangrene to LLE pending amputation.     Neuro: Pain: IV Tylenol ATC  Psych: Depressed mood. No longer agitated or suicidal. Dronabinol for appetite.  CV: Septic shock--resolved; AFlutter: S/p Cardioverion on 12/30 Cont Amio 400 PO BID X 5 Days (5/5) and Digoxin: Toprol XL 25 QD; TTE (12/7) CHF- Severe RV dysfxn and LVEF 15%, holding Spironolactone and Valsartan. LV thrombus w LLE limb ischemia- Heparin gtt  changed to SQL BID. ASA 81 held. S/p LORENZO: no thrombus in atrial appendage (12/29) s/p DCCV (12/30) now on PO amio 400 BID. Pt agreeable for AKA, plan for surgery at some point s/p cardio conversion.   Pulm: on room air  GI: Reg/High fiber diet + ensures, DHT placed for TF, metamucil BID, appetite improving w/ drobinol; PPI. Ileostomy high output--Cont imodium 4 TID, cont. Metamucil BID, cont. Tincture of Opium QID, cont. Lomotil, Severe mal-nutrition - cont. calorie ct, cont. vit c, cont. zinc, cont. MVI.   : Voids- Infarction of upper pole of Left Kidney, Renal US negative on 12/12. Uriarte discontinued  ID: Off ABX // Dry gangrene no ABX.  Ischemic bowel: Zosyn (12/11-12/21), LLE wounds Vanc (12-19-21).   Heme: Hep C positive; Active T&S; Hg goal > 8  Endo: mISS, Hypoglycemia Resolved   PPx: SCD Therapeutic Lovenox 60 bid  Lines: PIVs // DC: Rt IJ TLC( 12/22-12/30)  Rt TLC (12/11-12/17), L radial lizette (12/11-12/20). R Lizette (--12/26)  Dispo: SICU, pending AKA    Will follow up feed plan with surgical team  Monitoring ostomy output, replete 1:2  F/u timing for OR w/ vascular surgery    77M with PMHx of IVDU found unresponsive at his nursing home, resolved after Narcan in the field and brought to [facility]. Found to have PE, atrial flutter, and large LV thrombus on echo. Transferred to [facility] for further management. Pt c/o abdominal pain on 12/10 CTA showing mid SMA with embolus. Abnormal distal small bowel loops and cecum with dilatation and pneumatosis suggesting infarcted bowel. One or two tiny foci of intrahepatic portal vein pneumatosis. Segmental infarction upper pole left kidney. Now s/p Ex lap, SMA embolectomy, 80cm SBR, abthera vac left in discontinuity (12/11) and transferred to SICU intubated. S/p second look (12/13) and most recently s/p OR for 3rd look, end-to-end anastomosis of remaining bowel, loop ileostomy and abdomen closure (12/15). Remains in SICU, now extubated still with limb ischemia and dry gangrene to LLE pending amputation.     Neuro: Pain: IV Tylenol ATC, Oxycodone PRN for sacral pain  Psych: Depressed mood. No longer agitated or suicidal. Dronabinol for appetite.  CV: Septic shock--resolved; AFlutter: S/p Cardioverion on 12/30 Cont Amio 400 PO BID X 5 Days (5/5) with adjustment to 200mg PO QD 01/04 and Digoxin: Toprol XL 25 QD; TTE (12/7) CHF- Severe RV dysfxn and LVEF 15%, holding Spironolactone and Valsartan. LV thrombus w LLE limb ischemia- Heparin gtt  changed to SQL BID. ASA 81 held. S/p LORENZO: no thrombus in atrial appendage (12/29) s/p DCCV (12/30) now on PO amio 400 BID. Pt agreeable for AKA, plan for surgery at some point s/p cardio conversion.   Pulm: on room air  GI: Reg/High fiber diet + ensures, DHT placed for TF, advance TFs to goal, appetite improving w/ drobinol; PPI. Ileostomy high output--Cont Imodium 4 TID, cont. Metamucil BID, cont. Tincture of Opium QID, increase Lomotil to BID, Severe mal-nutrition - cont. calorie ct, cont. vit c, cont. zinc, cont. MVI  : Voids- Infarction of upper pole of Left Kidney, Renal US negative on 12/12. Uriarte discontinued  ID: Off ABX // Dry gangrene no ABX.  Ischemic bowel: Zosyn (12/11-12/21), LLE wounds Vanc (12-19-21).   Heme: Hep C positive; Active T&S; Hg goal > 8  Endo: mISS, Hypoglycemia Resolved   PPx: SCD Therapeutic Lovenox 60 bid  Lines: PIVs // DC: Rt IJ TLC( 12/22-12/30)  Rt TLC (12/11-12/17), L radial connie (12/11-12/20). R Kennard (--12/26)  Dispo: SICU, pending AKA    Will follow up feed plan with surgical team  Monitoring ostomy output, replete 1:2  F/u timing for OR w/ vascular surgery

## 2023-01-03 NOTE — PROGRESS NOTE ADULT - SUBJECTIVE AND OBJECTIVE BOX
Subjective: Pt seen and examined bedside this AM by vascular team. Pt has no complaints this AM. Pts offloading boots intact b/l and LLE dressing C/D/I.     ROS:   Denies Headache, blurred vision, Chest Pain, SOB, Abdominal pain, nausea or vomiting     Social   aMIOdarone    Tablet 400  enoxaparin Injectable 60  metoprolol succinate ER 25      Allergies    No Known Allergies    Intolerances        Vital Signs Last 24 Hrs  T(C): 36.9 (03 Jan 2023 05:19), Max: 37.5 (02 Jan 2023 17:06)  T(F): 98.5 (03 Jan 2023 05:19), Max: 99.5 (02 Jan 2023 17:06)  HR: 83 (03 Jan 2023 07:00) (76 - 102)  BP: 117/64 (03 Jan 2023 07:00) (93/69 - 120/68)  BP(mean): 84 (03 Jan 2023 07:00) (72 - 86)  RR: 21 (03 Jan 2023 07:00) (9 - 61)  SpO2: 99% (03 Jan 2023 07:00) (99% - 100%)    Parameters below as of 03 Jan 2023 07:00  Patient On (Oxygen Delivery Method): room air      I&O's Summary    02 Jan 2023 07:01  -  03 Jan 2023 07:00  --------------------------------------------------------  IN: 2940 mL / OUT: 3725 mL / NET: -785 mL    03 Jan 2023 07:01  -  03 Jan 2023 07:38  --------------------------------------------------------  IN: 10 mL / OUT: 0 mL / NET: 10 mL      General: NAD, resting in bed.  Pulmonary: Nonlabored breathing, no respiratory distress  Cardiovascular: regular. rate  Abd: soft, ileostomy pink, ostomy bag with brown liquid output, midline incision with staples  Extremities: Toes 1-5 of LLE w/ dry gangrene. Large bullae of anterior and lateral foot, blisters of anterior left calf have opened, dressed in kerlix with serous drainage from the desquamated patches. Diffuse mild edema of LLE. LLE cool. No signs of infection or wet gangrene. No Left DP/PT signals        LABS:                        9.6    7.29  )-----------( 252      ( 03 Jan 2023 05:18 )             30.1     01-03    137  |  107  |  14  ----------------------------<  103<H>  4.1   |  21<L>  |  0.68    Ca    8.5      03 Jan 2023 05:18  Phos  2.2     01-03  Mg     2.0     01-03    TPro  6.5  /  Alb  2.9<L>  /  TBili  1.3<H>  /  DBili  0.6<H>  /  AST  42<H>  /  ALT  32  /  AlkPhos  117  01-02          A/P: 77y YO Male with significant PMHx of IVDU found unresponsive at his nursing home, resolved after Narcan in the field, and brought to [facility]. Found to have PE, atrial flutter, and large LV thrombus on echo. Transferred to [facility] for further management. Pt C/o abdominal pain on 12/10 CTA showing mid SMA with embolus. Abnormal distal small bowel loops and cecum with dilatation and pneumatosis suggesting infarcted bowel. One or two tiny foci of  intrahepatic portal vein pneumatosis. Segmental infarction upper pole left kidney. Now s/p Ex lap, SMA embolectomy, 80cm SBR, abthera vac left in discontinuity (12/11) and transferred to SICU intubated. S/p second look (12/13) and most recently s/p OR for 3rd look, end-to-end anastomosis of remaining bowel, loop ileostomy and abdomen closure (12/15). Remains in SICU now extubated with limb ischemia to LLE pending amputation. Ischemia has not yet fully demarcated, it is clear that the posterior calf (which is needed to heal a BKA flap) is involved and therefore only surgical option available to the patient is an AKA planning for likely this week.     Plan:   - Dry gangrene of toes and part of foot with edema from the foot to the knee with no induration, fluctuation, or any sign of infection at this time. There is no concern for wet gangrene at this time.   - Planning for AKA once patient's clinical status is improved. Appreciate electrophysiology/surgery input.   - Continue local wound care with silverdine to blisters and kerlex  - Rest of care per SICU  - Vascular surgery Team 3C will continue to follow. Please call [phone] with any questions or concerns.

## 2023-01-03 NOTE — PROGRESS NOTE ADULT - ASSESSMENT
78yo M with no significant PMH/PSx admitted to cardiology service on 12/7 in the setting of severely reduced LV function 2/2 an LV thrombus. Pt now with several days of post-prandial abdominal pain and loose BMs, initially suspected to be 2/2 opioid withdrawal, however on evening of 12/10 had a large bloody BM. 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 Vascular surgery (already following) with plan for OR. General surgery consulted for possible operative assistance in the setting of bowel ischemia. Now POD2 s/p ex lap, SMA embolectomy and small bowel resection. Transferred to CCU on 12/21/22 for cardiac optimization and now transferred back to SICU 12/22/22 for bleeding hemodynamic instability. CTA performed demonstrating large hematoma in R abdomen, new hemoperitoneum, and bilateral pleural effusions. Now s/p cardioversion (12/31) and in normal sinus rhythm. Awaiting vascular surgery planning for AKA.    Plan:  Monitor Ostomy output. Continue with 1:2 repletion of ileostomy output  Continue Imodium, opium tincture, lomotil for high ileostomy output  Appreciate vascular surgery recs regarding AKA planning  Appreciate cardiology recs  Continue therapeutic lovenox  LFD with Tube feeds. Tube feeds to goal as tolerated  Rest of Care per SICU team  Surgery Team 4C will continue to follow. Please page Team 4 with questions/clinical changes. [phone]

## 2023-01-03 NOTE — PROGRESS NOTE ADULT - PROBLEM SELECTOR PLAN 5
Hospital course complicated by LLE limb ischemia. Physical exam notable for areas of dry gangrene to the LLE, no signs of active infection.   -Patient pending AKA this week

## 2023-01-04 NOTE — CHART NOTE - NSCHARTNOTEFT_GEN_A_CORE
The writer met with the patient briefly for f/u individual psychotherapy, exploring the patient's response to his current hospitalization.

## 2023-01-04 NOTE — SWALLOW BEDSIDE ASSESSMENT ADULT - NS SPL SWALLOW CLINIC TRIAL FT
Limited assessment given pt's continued agitation. Prolonged mastication d/t edentulous status and not 2/2 weakness. Laryngeal movement palpated. No clinical overt s/s of aspiration appreciated.

## 2023-01-04 NOTE — SWALLOW BEDSIDE ASSESSMENT ADULT - SLP PERTINENT HISTORY OF CURRENT PROBLEM
77M s/p ex lap, SMA embolectomy, SBR, abthera vac on 12/11, return to OR for SBR on 12/13, s/p 12/15 ex lap, DANIEL of discontinuous gut, loop ileostomy. Intubated 12/11-12/17

## 2023-01-04 NOTE — PROGRESS NOTE ADULT - SUBJECTIVE AND OBJECTIVE BOX
SUBJECTIVE: Pt seen and examined by chief resident. Pt is doing well, resting comfortably on bed. Unable to sleep overnight. Requesting to be turned to right side.     Vital Signs Last 24 Hrs  T(C): 37.6 (04 Jan 2023 04:27), Max: 37.7 (03 Jan 2023 13:00)  T(F): 99.6 (04 Jan 2023 04:27), Max: 99.8 (03 Jan 2023 13:00)  HR: 84 (04 Jan 2023 03:30) (74 - 85)  BP: 110/61 (04 Jan 2023 03:30) (90/51 - 123/64)  BP(mean): 80 (04 Jan 2023 03:30) (64 - 87)  RR: 18 (04 Jan 2023 03:30) (12 - 34)  SpO2: 100% (04 Jan 2023 03:30) (99% - 100%)    Parameters below as of 04 Jan 2023 03:30  Patient On (Oxygen Delivery Method): room air        I&O's Summary    02 Jan 2023 07:01  -  03 Jan 2023 07:00  --------------------------------------------------------  IN: 2940 mL / OUT: 3725 mL / NET: -785 mL    03 Jan 2023 07:01  -  04 Jan 2023 06:55  --------------------------------------------------------  IN: 2465 mL / OUT: 2805 mL / NET: -340 mL        Physical Exam:  General Appearance: Appears well, NAD  Pulmonary: Nonlabored breathing, no respiratory distress  Abdomen: Soft, nondistended, nontender. No rebound or guarding. Midline incision healing well without erythema or drainage. Ostomy noted in RLQ to be pink and patent with stool and gas in appliance  Extremities: Cachectic appearing. Dry gangrene noted in LLE from knee and distal    LABS:                        9.6    7.29  )-----------( 252      ( 03 Jan 2023 05:18 )             30.1     01-03    137  |  107  |  14  ----------------------------<  103<H>  4.1   |  21<L>  |  0.68    Ca    8.5      03 Jan 2023 05:18  Phos  2.2     01-03  Mg     2.0     01-03    TPro  6.5  /  Alb  2.9<L>  /  TBili  1.3<H>  /  DBili  0.6<H>  /  AST  42<H>  /  ALT  32  /  AlkPhos  117  01-02

## 2023-01-04 NOTE — PROGRESS NOTE ADULT - SUBJECTIVE AND OBJECTIVE BOX
Subjective: Pt seen and examined this AM. Pts left lower extremity dressing C/D/I. Offloading boots intact B/L. Pt sleepy and refusing to answer questions this AM.     ROS:   Denies Headache, blurred vision, Chest Pain, SOB, Abdominal pain, nausea or vomiting     Social   aMIOdarone    Tablet 200  enoxaparin Injectable 60  metoprolol succinate ER 25      Allergies    No Known Allergies    Intolerances        Vital Signs Last 24 Hrs  T(C): 37.6 (04 Jan 2023 04:27), Max: 37.7 (03 Jan 2023 13:00)  T(F): 99.6 (04 Jan 2023 04:27), Max: 99.8 (03 Jan 2023 13:00)  HR: 84 (04 Jan 2023 03:30) (74 - 85)  BP: 110/61 (04 Jan 2023 03:30) (90/51 - 123/64)  BP(mean): 80 (04 Jan 2023 03:30) (64 - 87)  RR: 18 (04 Jan 2023 03:30) (12 - 34)  SpO2: 100% (04 Jan 2023 03:30) (99% - 100%)    Parameters below as of 04 Jan 2023 03:30  Patient On (Oxygen Delivery Method): room air      I&O's Summary    03 Jan 2023 07:01  -  04 Jan 2023 07:00  --------------------------------------------------------  IN: 2465 mL / OUT: 2805 mL / NET: -340 mL        Physical Exam:  General: NAD, resting in bed.  Pulmonary: Nonlabored breathing, no respiratory distress  Cardiovascular: regular. rate  Abd: soft, ileostomy pink, ostomy bag with brown liquid output, midline incision with staples  Extremities: Toes 1-5 of LLE w/ dry gangrene-dressed with betadine. Large bullae of anterior-open, lateral-open and plantar foot, blisters of anterior left calf have opened, dressed in kerlix with serous drainage from the desquamated patches. Diffuse mild edema of LLE. LLE cool. No signs of infection or wet gangrene. No Left DP/PT signals      LABS:                        8.9    8.09  )-----------( 331      ( 04 Jan 2023 06:53 )             27.7     01-04    136  |  106  |  11  ----------------------------<  111<H>  3.5   |  21<L>  |  0.61    Ca    8.2<L>      04 Jan 2023 06:53  Phos  2.2     01-04  Mg     1.8     01-04    TPro  6.5  /  Alb  2.9<L>  /  TBili  1.3<H>  /  DBili  0.6<H>  /  AST  42<H>  /  ALT  32  /  AlkPhos  117  01-02          A/P: 77y YO Male with significant PMHx of IVDU found unresponsive at his nursing home, resolved after Narcan in the field, and brought to [facility]. Found to have PE, atrial flutter, and large LV thrombus on echo. Transferred to [facility] for further management. Pt C/o abdominal pain on 12/10 CTA showing mid SMA with embolus. Abnormal distal small bowel loops and cecum with dilatation and pneumatosis suggesting infarcted bowel. One or two tiny foci of  intrahepatic portal vein pneumatosis. Segmental infarction upper pole left kidney. Now s/p Ex lap, SMA embolectomy, 80cm SBR, abthera vac left in discontinuity (12/11) and transferred to SICU intubated. S/p second look (12/13) and most recently s/p OR for 3rd look, end-to-end anastomosis of remaining bowel, loop ileostomy and abdomen closure (12/15). Remains in SICU now extubated with limb ischemia to LLE pending amputation. Ischemia has not yet fully demarcated, it is clear that the posterior calf (which is needed to heal a BKA flap) is involved and therefore only surgical option available to the patient is an AKA planning.     Plan:   - Dry gangrene of toes and part of foot no induration, fluctuation, or any sign of infection at this time. There is no concern for wet gangrene at this time.   - Planning for AKA once patient's clinical status is improved. Appreciate electrophysiology/surgery input.   - Continue local wound care with silverdine to blisters and kerlex  - Rest of care per primary team   - Please obtain cardiac clearance and medical clearance from cardiology and hospitalist         Vascular surgery Team 3C will continue to follow. Please call [phone] with any questions or concerns.

## 2023-01-04 NOTE — SWALLOW BEDSIDE ASSESSMENT ADULT - SLP GENERAL OBSERVATIONS
Received awake in bed, A&Ox4, agitated and verbally abusive towards SLP. Denies dysphagic symptoms. RN reported good tolerance of current diet.

## 2023-01-04 NOTE — SWALLOW BEDSIDE ASSESSMENT ADULT - SWALLOW EVAL: DIAGNOSIS
Oropharyngeal swallow was clinically judged to be WFL with trialed consistencies with no concerns for airway protection. However, it should be noted that trials were limited d/t pt's agitation.

## 2023-01-05 NOTE — CONSULT NOTE ADULT - TIME BILLING
Review of hospital course (LOS >20) , labs, vitals, medical records.   Bedside exam and interview   Discussed plan of care with primary team ACP and housestaff   Documenting the encounter

## 2023-01-05 NOTE — CHART NOTE - NSCHARTNOTEFT_GEN_A_CORE
Called by nurse stating patient self removed dobhoff feeding tube. Pt was seen bedside by surgical team. When asked why the patient removed the tube he stated "I don't need it and it hurts my throat." Pt was educated on importance of dobhoff in order to optimize nutritional status to promote better wound healing and so that he can continue to recover. Pt continued to refuse.       Will continue to monitor and offer dobhoff for supplemental nutrition

## 2023-01-05 NOTE — PROGRESS NOTE ADULT - SUBJECTIVE AND OBJECTIVE BOX
SUBJECTIVE: Pt seen and examined by chief resident. Pt is doing well, resting comfortably on bed. Pain controlled. Tolerating ensures and sips of water when taking meds by nurse.    Vital Signs Last 24 Hrs  T(C): 37.1 (05 Jan 2023 04:36), Max: 37.2 (04 Jan 2023 09:00)  T(F): 98.7 (05 Jan 2023 04:36), Max: 99 (04 Jan 2023 09:00)  HR: 84 (05 Jan 2023 04:00) (70 - 84)  BP: 100/55 (05 Jan 2023 04:00) (100/55 - 135/81)  BP(mean): 72 (05 Jan 2023 04:00) (72 - 104)  RR: 18 (05 Jan 2023 04:00) (17 - 18)  SpO2: 98% (04 Jan 2023 23:50) (97% - 99%)    Parameters below as of 05 Jan 2023 04:00  Patient On (Oxygen Delivery Method): room air        I&O's Summary    03 Jan 2023 07:01  -  04 Jan 2023 07:00  --------------------------------------------------------  IN: 2405 mL / OUT: 2805 mL / NET: -400 mL    04 Jan 2023 07:01  -  05 Jan 2023 06:56  --------------------------------------------------------  IN: 3455 mL / OUT: 3525 mL / NET: -70 mL        Physical Exam:  General Appearance: Appears well, NAD  Pulmonary: Nonlabored breathing, no respiratory distress  Cardiovascular: NSR  Abdomen: Soft, nondistended, nontender. No rebound or guarding. Midline incision healing well without erythema or drainage. Ostomy noted in RLQ to be pink and patent with stool and gas in appliance  Extremities: Cachectic appearing. Dry gangrene noted in LLE from knee and distal    LABS:                        8.9    8.09  )-----------( 331      ( 04 Jan 2023 06:53 )             27.7     01-04    136  |  106  |  11  ----------------------------<  111<H>  3.5   |  21<L>  |  0.61    Ca    8.2<L>      04 Jan 2023 06:53  Phos  2.2     01-04  Mg     1.8     01-04             SUBJECTIVE: Pt seen and examined by chief resident. Pt is doing well, resting comfortably on bed. Pain controlled. Tolerating ensures and sips of water when taking meds by nurse.    Vital Signs Last 24 Hrs  T(C): 37.1 (05 Jan 2023 04:36), Max: 37.2 (04 Jan 2023 09:00)  T(F): 98.7 (05 Jan 2023 04:36), Max: 99 (04 Jan 2023 09:00)  HR: 84 (05 Jan 2023 04:00) (70 - 84)  BP: 100/55 (05 Jan 2023 04:00) (100/55 - 135/81)  BP(mean): 72 (05 Jan 2023 04:00) (72 - 104)  RR: 18 (05 Jan 2023 04:00) (17 - 18)  SpO2: 98% (04 Jan 2023 23:50) (97% - 99%)    Parameters below as of 05 Jan 2023 04:00  Patient On (Oxygen Delivery Method): room air        I&O's Summary    03 Jan 2023 07:01  -  04 Jan 2023 07:00  --------------------------------------------------------  IN: 2405 mL / OUT: 2805 mL / NET: -400 mL    04 Jan 2023 07:01  -  05 Jan 2023 06:56  --------------------------------------------------------  IN: 3455 mL / OUT: 3525 mL / NET: -70 mL        Physical Exam:  General Appearance: Appears well, NAD  Pulmonary: Nonlabored breathing, no respiratory distress  Abdomen: Soft, nondistended, nontender. No rebound or guarding. Midline incision healing well without erythema or drainage. Ostomy noted in RLQ to be pink and patent with stool and gas in appliance  Extremities: Cachectic appearing. Dry gangrene noted in LLE from knee and distal    LABS:                        8.9    8.09  )-----------( 331      ( 04 Jan 2023 06:53 )             27.7     01-04    136  |  106  |  11  ----------------------------<  111<H>  3.5   |  21<L>  |  0.61    Ca    8.2<L>      04 Jan 2023 06:53  Phos  2.2     01-04  Mg     1.8     01-04

## 2023-01-05 NOTE — PROGRESS NOTE ADULT - ASSESSMENT
77y YO Male with significant PMHx of IVDU found unresponsive at his nursing home, resolved after Narcan in the field, and brought to [facility]. Found to have PE, atrial flutter, and large LV thrombus on echo. Transferred to [facility] for further management. Pt C/o abdominal pain on 12/10 CTA showing mid SMA with embolus. Abnormal distal small bowel loops and cecum with dilatation and pneumatosis suggesting infarcted bowel. One or two tiny foci of  intrahepatic portal vein pneumatosis. Segmental infarction upper pole left kidney. Now s/p Ex lap, SMA embolectomy, 80cm SBR, abthera vac left in discontinuity (12/11) and transferred to SICU intubated. S/p second look (12/13) and most recently s/p OR for 3rd look, end-to-end anastomosis of remaining bowel, loop ileostomy and abdomen closure (12/15). Remains in SICU now extubated with limb ischemia to LLE pending amputation. Ischemia has not yet fully demarcated, it is clear that the posterior calf (which is needed to heal a BKA flap) is involved and therefore only surgical option available to the patient is an AKA planning.     Recommendations:    - No concerns for wet gangrene of L LE  - Planning for AKA once patient's clinical status is improved  - Appreciate cardiology/ep recs for preop clearance  - Hospitalist recs for preop clearance  - Continue local wound care with silverdine to blisters and kerlex  - Rest of care per primary team   - Vascular surgery Team 3C will continue to follow. Please call [phone] with any questions or concerns.

## 2023-01-05 NOTE — CONSULT NOTE ADULT - ASSESSMENT
77 year old man who first presented to [facility] from [facility] due to unresponsiveness (responded to Narcan). At [facility], found to have subsegmental pulmonary embolism in RUL, rapid atrial flutter with severely reduced LVEF with LV thrombus. Transferred to [facility] on 12/7/22 for LORENZO and possible ablation. At [facility], was found to have left leg ischemia (left leg arterial thrombus on LE duplex on 12/8). Was being considered for rhythm control when he developed abdominal pain, CTA (12/10/22) showed embolus in SMA, bowel infarct, requiring ex-lap on 12/11 with SMA embolectomy, 80cm small bowel resection; On 12/13, underwent 2nd look laparotomy, with 80cm small bowel resection, closure with abthera. On 12/15, underwent 3rd look laparotomy, end-to-end anastomosis of remaining bowel, loop ileostomy and abdominal closure. Now extubated, on tele floor. Eventually underwent LORENZO/DCCV on 12/30/22, now in sinus rhythm. Currently being evaluated for possible above knee amputation for LLE ischemia.     # pre-operative assessment   Defer evaluation of cardiac risk to cardiology consult; agree with recommendation for cardiac anesthesia.   Per EP, patient should be on uninterrupted anticoagulation for 30 days after DCCV.   Per cardiology, would not recommend interrupting anticoagulation if pursuing surgery before 30 day duration is complete.  Timing of Above knee amputation per vascular surgery, and surgical team's assessment of bleeding risk, given EP/Cards recommendations for uninterrupted anti-coagulation     # Left leg ischemia - Decision re: timing of Above Knee Amputation per vascular surgery     # Acute Systolic Heart Failure - Followed by heart failure team; Euvolemic today. Continue metoprolol succinate 25mg qd   # Atrial Flutter - EP following, now s/p DCCV 12/30. EP recommending uninterrupted AC x 30 days (on enoxaparin 60mg SQ q12). On amiodarone 200mg qd, metoprolol succinate 25 qd   # Pulmonary embolism (subsegmental RUL) - After completion of AC for DCCV, would need to continue AC for PE  # Bowel ischemia - s/p SMA embolectomy; Small bowel resection 80 cm + 40 cm; ileostomy in place; mgmt per primary team     # Hypophosphatemia - Phosphorus Level, Serum: 2.2 mg/dL (01-04-23 @ 06:53); Likely 2/2 poor PO intake, Replete  # Acute blood loss anemia - Hgb downtrended from time of admission (Hgb 14 --> ~9); partially attributable to operative blood losses (underwent laparotomy x 3); [ ]  recommend checking ferritin, iron sat, repleting if deficient    Nutrition   - Dobhoff feeding tube in place, getting tube feeds in addition to regular texture high fiber diet   - Getting dronabinol 5mg q12 hrs   - Monitor ileostomy output on loperamide 4mg TID, tincture of opium QID     GI PPX - on pantoprazole 40mg PO BID   DVT PPX - already on full dose AC with lovenox        77 year old man who first presented to [facility] from [facility] due to unresponsiveness (responded to Narcan). At [facility], found to have subsegmental pulmonary embolism in RUL, rapid atrial flutter with severely reduced LVEF with LV thrombus. Transferred to [facility] on 12/7/22 for LORENZO and possible ablation. At [facility], was found to have left leg ischemia (left leg arterial thrombus on LE duplex on 12/8). Was being considered for rhythm control when he developed abdominal pain, CTA (12/10/22) showed embolus in SMA, bowel infarct, requiring ex-lap on 12/11 with SMA embolectomy, 80cm small bowel resection; On 12/13, underwent 2nd look laparotomy, with 80cm small bowel resection, closure with abthera. On 12/15, underwent 3rd look laparotomy, end-to-end anastomosis of remaining bowel, loop ileostomy and abdominal closure. Now extubated, on tele floor. Eventually underwent LORENZO/DCCV on 12/30/22, now in sinus rhythm. Currently being evaluated for possible above knee amputation for LLE ischemia.     # pre-operative assessment   Defer evaluation of cardiac risk to cardiology consult; agree with recommendation for cardiac anesthesia.   Per EP, patient should be on uninterrupted anticoagulation for 30 days after DCCV.   Per cardiology, would not recommend interrupting anticoagulation if pursuing surgery before 30 day duration is complete.  Timing of Above knee amputation per vascular surgery, and surgical team's assessment of bleeding risk, given EP/Cards recommendations for uninterrupted anti-coagulation   Patient is Intermediate to high risk for an intermediate risk procedure. Given the risk of progression of disease with non-intervention, reasonable to proceed with surgery so long as it remains consistent with patient's goals of care.   Internal medicine service will continue to follow. [facility] if [facility]'s clinical status changes.     # Left leg ischemia - Decision re: timing of Above Knee Amputation per vascular surgery     # Acute Systolic Heart Failure - Followed by heart failure team; Euvolemic today. Continue metoprolol succinate 25mg qd   # Atrial Flutter - EP following, now s/p DCCV 12/30. EP recommending uninterrupted AC x 30 days (on enoxaparin 60mg SQ q12). On amiodarone 200mg qd, metoprolol succinate 25 qd   # Pulmonary embolism (subsegmental RUL) - After completion of AC for DCCV, would need to continue AC for PE  # Bowel ischemia - s/p SMA embolectomy; Small bowel resection 80 cm + 40 cm; ileostomy in place; mgmt per primary team     # Hypophosphatemia - Phosphorus Level, Serum: 2.2 mg/dL (01-04-23 @ 06:53); Likely 2/2 poor PO intake, Replete  # Acute blood loss anemia - Hgb downtrended from time of admission (Hgb 14 --> ~9); partially attributable to operative blood losses (underwent laparotomy x 3); [ ]  recommend checking ferritin, iron sat, repleting if deficient    Nutrition   - Dobhoff feeding tube in place, getting tube feeds in addition to regular texture high fiber diet   - Getting dronabinol 5mg q12 hrs   - Monitor ileostomy output on loperamide 4mg TID, tincture of opium QID     GI PPX - on pantoprazole 40mg PO BID   DVT PPX - already on full dose AC with lovenox

## 2023-01-05 NOTE — PROGRESS NOTE ADULT - SUBJECTIVE AND OBJECTIVE BOX
SUBJECTIVE: Patient seen and examined bedside; no events overnight, HD stable, sleepy this am, c/o mild pain during dressing changes.    aMIOdarone    Tablet 200 milliGRAM(s) Oral daily  enoxaparin Injectable 60 milliGRAM(s) SubCutaneous every 12 hours  metoprolol succinate ER 25 milliGRAM(s) Oral daily      Vital Signs Last 24 Hrs  T(C): 36.7 (05 Jan 2023 09:17), Max: 37.1 (05 Jan 2023 04:36)  T(F): 98 (05 Jan 2023 09:17), Max: 98.7 (05 Jan 2023 04:36)  HR: 88 (05 Jan 2023 11:58) (70 - 88)  BP: 101/65 (05 Jan 2023 11:58) (93/50 - 135/81)  BP(mean): 78 (05 Jan 2023 11:58) (65 - 104)  RR: 17 (05 Jan 2023 11:58) (17 - 18)  SpO2: 99% (05 Jan 2023 11:58) (98% - 99%)    Parameters below as of 05 Jan 2023 11:58  Patient On (Oxygen Delivery Method): room air      I&O's Detail    04 Jan 2023 07:01  -  05 Jan 2023 07:00  --------------------------------------------------------  IN:    Jevity 1.2: 1080 mL    Lactated Ringers Bolus: 400 mL    Lactated Ringers Bolus: 1000 mL    Oral Fluid: 1020 mL  Total IN: 3500 mL    OUT:    Ileostomy (mL): 2575 mL    Voided (mL): 950 mL  Total OUT: 3525 mL    Total NET: -25 mL      05 Jan 2023 07:01  -  05 Jan 2023 14:00  --------------------------------------------------------  IN:    Jevity 1.2: 315 mL    Oral Fluid: 250 mL  Total IN: 565 mL    OUT:    Ileostomy (mL): 1450 mL    Voided (mL): 350 mL  Total OUT: 1800 mL    Total NET: -1235 mL      PE:    General: NAD, resting comfortably in bed  C/V: S1 s2, RRR  Pulm: Nonlabored breathing, no respiratory distress  Abd: Soft, NTND, ostomy with brown stool in bag  Extrem: Left lower extremity with no palpable DP/PT pulses, dry gangrene of toes, multiple bullae noted over left foot, lateral and medial leg, skin darkening likely 2/2 to necrosis up to upper 1/3 of leg        LABS:                        8.9    8.09  )-----------( 331      ( 04 Jan 2023 06:53 )             27.7     01-04    136  |  106  |  11  ----------------------------<  111<H>  3.5   |  21<L>  |  0.61    Ca    8.2<L>      04 Jan 2023 06:53  Phos  2.2     01-04  Mg     1.8     01-04            RADIOLOGY & ADDITIONAL STUDIES:

## 2023-01-05 NOTE — PROGRESS NOTE ADULT - ASSESSMENT
77M with PMHx of IVDU found unresponsive at his nursing home, resolved after Narcan in the field and brought to [facility]. Found to have PE, atrial flutter, and large LV thrombus on echo. Transferred to [facility] for further management. Pt c/o abdominal pain on 12/10 CTA showing mid SMA with embolus. Abnormal distal small bowel loops and cecum with dilatation and pneumatosis suggesting infarcted bowel. One or two tiny foci of intrahepatic portal vein pneumatosis. Segmental infarction upper pole left kidney. Now s/p Ex lap, SMA embolectomy, 80cm SBR, abthera vac left in discontinuity (12/11) and transferred to SICU intubated. S/p second look (12/13) and most recently s/p OR for 3rd look, end-to-end anastomosis of remaining bowel, loop ileostomy and abdomen closure (12/15). Remains in SICU, now extubated with still with limb ischemia to LLE pending amputation. Now stepped down to tele.     Reg/High fiber diet + ensures, DHT placed for TF, Jevity 1.2@goal   Pain/nausea control  Imodium, metamucin, tinctuire of opium for high ileostomy output  A flutter now s/p cardioversion, amio load, now on amio 200 QD, toprolol XL 25 QD  LV thrombus w LLE limb ischemia- SQL BID  Planning for AKA with vascular surgery  AM labs

## 2023-01-05 NOTE — CONSULT NOTE ADULT - ASSESSMENT
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    #Pre op   -Pt is euvolemic, and compensated from HF standpoint.   - Would check CMP and lactic w/ AM labs to ensure he remains compensated from HF standpoint  There is no cardiac contraindication to proceed w/ AKA  Given recent DCCV he will need to stay on uninterrupted anticoagulation through surgery  Continue metoprolol succinate through surgery.  Patient is intermediate risk for this intermediate risk procedure  Recommend cardiac anesthesiologist     #HFrEF - followed by HF team. C/w metoprolol succinate. Initiate GDMT post operatively  # A flutter - currently in sinus. Continue enoxaparin 60 bid for ACand amio 200mg qD. Plan for DOAC prior to d/c.  #LV thrombus - no longer present in latest echo

## 2023-01-05 NOTE — CONSULT NOTE ADULT - SUBJECTIVE AND OBJECTIVE BOX
HPI:  Admission H&P  Patient is a 78 y/o male with no significant past medical history BIBA from [facility] due to unresponsiveness. Patient was reportedly found unresponsive at his nursing home, Narcan was given in the field with resolution, and patient was taken to [facility]. Pt was persistently tachycardic in 130-140s despite receiving Adenosine 6mg IV and then 12mg IV, Lopressor 2.5mg x2, PO Metoprolol tartrate 100mg. Utox was positive for opioids. CTPA suggestive of subsegmental PE in the right upper lobe. Echo showed severely reduced LV function with an LV thrombus. Heparin gtt was started. Cardiology and EP were consulted due to atrial flutter. EP suggested transfer to [facility] for LORENZO and possible ablation.  (07 Dec 2022 12:26)    Hospital Course:   On 12/10 , CTA showed embolus in mid SMA, abnormal distal small bowel loops and cecum with dilatation and pneumatosis, suggesting infarcted bowel, segmental infarction of upper pole of left kidney. On 12/11, underwent exlap, SMA embolectomy, 80cm small bowel resection, abthera vac left in discontinuity; transferred to SICU postop and intubated. On 12/13, underwent second look laparotomy, 40cm small bowel resection, temporary abd closure with abthera; On 12/15, underwent 3rd look laparotomy, end-to-end anastomosis of remaining bowel, loop ileostomy and abdomen closure. Now extubated, on tele floor.    Hospital course also complicated by Left leg ischemia (on 12/8, bilateral duplex showed acute thrombus occluding left popliteal artery, , left mid superficial artery, incomplete occlusion of dorsalis pedis). Currently on Full AC with Lovenox SubQ BID. Being evaluated for possible above knee amputation.     PAST MEDICAL & SURGICAL HISTORY:  Home Medications: - None  Allergies - Denies any allergies to medications    FAMILY HISTORY: - no myocardial infarction in parents or in siblings ; No cancer in parents or in siblings     Social History:  Lives at [facility] (07 Dec 2022 12:26)  Reported hx of IVDU     REVIEW OF SYSTEMS:  CONSTITUTIONAL: No fever  EYES: No eye pain, or discharge  ENMT:  No tinnitus, vertigo  NECK: No stiffness; able to rotate neck  RESPIRATORY: No cough, No dyspnea  CARDIOVASCULAR: No chest pain ;   GASTROINTESTINAL: no melena; soft output from ostomy   GENITOURINARY: No dysuria or hematuria  NEUROLOGICAL: No sensation in left foot. no tremors   SKIN: left foot skin appearance consistent with dry gangrene ; right foot skin warm   ENDOCRINE: No heat or cold intolerance;  PSYCHIATRIC: sleep disturbance, dysphoria   HEME/LYMPH: No bleeding gums;   ALLERGY AND IMMUNOLOGIC: No hives or eczema    Diet, Regular:   High Fiber (HIFIBER)  Tube Feeding Modality: Nasogastric  Vital 1 Kip (VITAL1)  Total Volume for 24 Hours (mL): 1080  Total Number of Cans: 5  Continuous  Starting Tube Feed Rate mL per Hour: 10  Increase Tube Feed Rate by (mL): 10     Every 3 hours  Until Goal Tube Feed Rate (mL per Hour): 45  Tube Feed Duration (in Hours): 24  Tube Feed Start Time: 08:00  Supplement Feeding Modality:  Oral  Ensure Max Cans or Servings Per Day:  1       Frequency:  Three Times a day (01-03-23 @ 17:04) [Active]    CURRENT MEDICATIONS:   acetaminophen     Tablet .. 650 milliGRAM(s) Oral every 6 hours PRN  aMIOdarone    Tablet 200 milliGRAM(s) Oral daily  ascorbic acid 500 milliGRAM(s) Oral daily  chlorhexidine 2% Cloths 1 Application(s) Topical <User Schedule>  dextrose 5%. 1000 milliLiter(s) IV Continuous <Continuous>  dextrose 5%. 1000 milliLiter(s) IV Continuous <Continuous>  dextrose Oral Gel 15 Gram(s) Oral once PRN  diphenoxylate/atropine 2 Tablet(s) Oral three times a day  dronabinol 5 milliGRAM(s) Oral every 12 hours  enoxaparin Injectable 60 milliGRAM(s) SubCutaneous every 12 hours  glucagon  Injectable 1 milliGRAM(s) IntraMuscular once  influenza  Vaccine (HIGH DOSE) 0.7 milliLiter(s) IntraMuscular once  insulin lispro (ADMELOG) corrective regimen sliding scale   SubCutaneous every 6 hours  loperamide 4 milliGRAM(s) Oral three times a day  metoprolol succinate ER 25 milliGRAM(s) Oral daily  multivitamin 1 Tablet(s) Oral daily  opium Tincture 6 milliGRAM(s) Oral four times a day  oxyCODONE    IR 5 milliGRAM(s) Oral every 4 hours PRN  pantoprazole    Tablet 40 milliGRAM(s) Oral two times a day  psyllium Powder 1 Packet(s) Oral every 12 hours  silver sulfADIAZINE 1% Cream 1 Application(s) Topical daily  zinc sulfate 220 milliGRAM(s) Oral daily      VITAL SIGNS, INS/OUTS (last 24 hours):  Vital Signs Last 24 Hrs  T(C): 36.7 (05 Jan 2023 09:17), Max: 37.1 (05 Jan 2023 04:36)  T(F): 98 (05 Jan 2023 09:17), Max: 98.7 (05 Jan 2023 04:36)  HR: 78 (05 Jan 2023 10:16) (70 - 84)  BP: 104/58 (05 Jan 2023 10:16) (93/50 - 135/81)  BP(mean): 76 (05 Jan 2023 10:16) (65 - 104)  RR: 18 (05 Jan 2023 10:16) (17 - 18)  SpO2: 99% (05 Jan 2023 10:16) (98% - 99%)    Parameters below as of 05 Jan 2023 10:16  Patient On (Oxygen Delivery Method): room air      I&O's Summary    04 Jan 2023 07:01  -  05 Jan 2023 07:00  --------------------------------------------------------  IN: 3500 mL / OUT: 3525 mL / NET: -25 mL    05 Jan 2023 07:01  -  05 Jan 2023 12:10  --------------------------------------------------------  IN: 430 mL / OUT: 1200 mL / NET: -770 mL    PHYSICAL EXAM:  Gen: Reclining in bed at time of exam, appears stated age  HEENT: NCAT, MMM, feeding tube in nares   Neck: supple, trachea at midline  CV: RRR, +S1/S2  Pulm: adequate respiratory effort, no increase in work of breathing  Abd: soft, ND; soft brown output from ostomy.   Skin: right foot skin warm ; no hives , no new rash   Ext: left foot appearance consistent with dry gangrene. right foot not cold   Neuro: Alert and oriented to person, place, situation,  speaking in full sentences   Psych: dysthymic; behavior appropriate. Unagitated.     BASIC LABS:                        8.9    8.09  )-----------( 331      ( 04 Jan 2023 06:53 )             27.7     01-04    136  |  106  |  11  ----------------------------<  111<H>  3.5   |  21<L>  |  0.61    Ca    8.2<L>      04 Jan 2023 06:53  Phos  2.2     01-04  Mg     1.8     01-04          CAPILLARY BLOOD GLUCOSE      POCT Blood Glucose.: 101 mg/dL (04 Jan 2023 12:24)      OTHER LABS:        MICRODATA:      IMAGING:    EKG:    #Diet - Diet, Regular:   High Fiber (HIFIBER)  Tube Feeding Modality: Nasogastric  Vital 1 Kip (VITAL1)  Total Volume for 24 Hours (mL): 1080  Total Number of Cans: 5  Continuous  Starting Tube Feed Rate mL per Hour: 10  Increase Tube Feed Rate by (mL): 10     Every 3 hours  Until Goal Tube Feed Rate (mL per Hour): 45  Tube Feed Duration (in Hours): 24  Tube Feed Start Time: 08:00  Supplement Feeding Modality:  Oral  Ensure Max Cans or Servings Per Day:  1       Frequency:  Three Times a day (01-03-23 @ 17:04) [Active]        #DVT PPx - enoxaparin Injectable: [Ordered as LOVENOX]  60 milliGRAM(s), SubCutaneous, every 12 hours  Administration Instructions: This is a High Alert Medication. (12-31-22 @ 11:29)   HPI:  Admission H&P  Patient is a 76 y/o male with no significant past medical history BIBA from [facility] due to unresponsiveness. Patient was reportedly found unresponsive at his nursing home, Narcan was given in the field with resolution, and patient was taken to [facility]. Pt was persistently tachycardic in 130-140s despite receiving Adenosine 6mg IV and then 12mg IV, Lopressor 2.5mg x2, PO Metoprolol tartrate 100mg. Utox was positive for opioids. CTPA suggestive of subsegmental PE in the right upper lobe. Echo showed severely reduced LV function with an LV thrombus. Heparin gtt was started. Cardiology and EP were consulted due to atrial flutter. EP suggested transfer to [facility] for LORENZO and possible ablation.  (07 Dec 2022 12:26)    Hospital Course:   On 12/10 , CTA showed embolus in mid SMA, abnormal distal small bowel loops and cecum with dilatation and pneumatosis, suggesting infarcted bowel, segmental infarction of upper pole of left kidney. On 12/11, underwent exlap, SMA embolectomy, 80cm small bowel resection, abthera vac left in discontinuity; transferred to SICU postop and intubated. On 12/13, underwent second look laparotomy, 40cm small bowel resection, temporary abd closure with abthera; On 12/15, underwent 3rd look laparotomy, end-to-end anastomosis of remaining bowel, loop ileostomy and abdomen closure. Now extubated, on tele floor.    On 12/30, underwent LORENZO/DCCV.   Hospital course also complicated by Left leg ischemia (on 12/8, bilateral duplex showed acute thrombus occluding left popliteal artery, , left mid superficial artery, incomplete occlusion of dorsalis pedis). Currently on Full AC with Lovenox SubQ BID. Being evaluated for possible above knee amputation.     PAST MEDICAL & SURGICAL HISTORY:  Home Medications: - None  Allergies - Denies any allergies to medications    FAMILY HISTORY: - no myocardial infarction in parents or in siblings ; No cancer in parents or in siblings     Social History:  Lives at [facility] (07 Dec 2022 12:26)  Reported hx of IVDU     REVIEW OF SYSTEMS:  CONSTITUTIONAL: No fever  EYES: No eye pain, or discharge  ENMT:  No tinnitus, vertigo  NECK: No stiffness; able to rotate neck  RESPIRATORY: No cough, No dyspnea  CARDIOVASCULAR: No chest pain ;   GASTROINTESTINAL: no melena; soft output from ostomy   GENITOURINARY: No dysuria or hematuria  NEUROLOGICAL: No sensation in left foot. no tremors   SKIN: left foot skin appearance consistent with dry gangrene ; right foot skin warm   ENDOCRINE: No heat or cold intolerance;  PSYCHIATRIC: sleep disturbance, dysphoria   HEME/LYMPH: No bleeding gums;   ALLERGY AND IMMUNOLOGIC: No hives or eczema    Diet, Regular:   High Fiber (HIFIBER)  Tube Feeding Modality: Nasogastric  Vital 1 Kip (VITAL1)  Total Volume for 24 Hours (mL): 1080  Total Number of Cans: 5  Continuous  Starting Tube Feed Rate mL per Hour: 10  Increase Tube Feed Rate by (mL): 10     Every 3 hours  Until Goal Tube Feed Rate (mL per Hour): 45  Tube Feed Duration (in Hours): 24  Tube Feed Start Time: 08:00  Supplement Feeding Modality:  Oral  Ensure Max Cans or Servings Per Day:  1       Frequency:  Three Times a day (01-03-23 @ 17:04) [Active]    CURRENT MEDICATIONS:   acetaminophen     Tablet .. 650 milliGRAM(s) Oral every 6 hours PRN  aMIOdarone    Tablet 200 milliGRAM(s) Oral daily  ascorbic acid 500 milliGRAM(s) Oral daily  chlorhexidine 2% Cloths 1 Application(s) Topical <User Schedule>  dextrose 5%. 1000 milliLiter(s) IV Continuous <Continuous>  dextrose 5%. 1000 milliLiter(s) IV Continuous <Continuous>  dextrose Oral Gel 15 Gram(s) Oral once PRN  diphenoxylate/atropine 2 Tablet(s) Oral three times a day  dronabinol 5 milliGRAM(s) Oral every 12 hours  enoxaparin Injectable 60 milliGRAM(s) SubCutaneous every 12 hours  glucagon  Injectable 1 milliGRAM(s) IntraMuscular once  influenza  Vaccine (HIGH DOSE) 0.7 milliLiter(s) IntraMuscular once  insulin lispro (ADMELOG) corrective regimen sliding scale   SubCutaneous every 6 hours  loperamide 4 milliGRAM(s) Oral three times a day  metoprolol succinate ER 25 milliGRAM(s) Oral daily  multivitamin 1 Tablet(s) Oral daily  opium Tincture 6 milliGRAM(s) Oral four times a day  oxyCODONE    IR 5 milliGRAM(s) Oral every 4 hours PRN  pantoprazole    Tablet 40 milliGRAM(s) Oral two times a day  psyllium Powder 1 Packet(s) Oral every 12 hours  silver sulfADIAZINE 1% Cream 1 Application(s) Topical daily  zinc sulfate 220 milliGRAM(s) Oral daily      VITAL SIGNS, INS/OUTS (last 24 hours):  Vital Signs Last 24 Hrs  T(C): 36.7 (05 Jan 2023 09:17), Max: 37.1 (05 Jan 2023 04:36)  T(F): 98 (05 Jan 2023 09:17), Max: 98.7 (05 Jan 2023 04:36)  HR: 78 (05 Jan 2023 10:16) (70 - 84)  BP: 104/58 (05 Jan 2023 10:16) (93/50 - 135/81)  BP(mean): 76 (05 Jan 2023 10:16) (65 - 104)  RR: 18 (05 Jan 2023 10:16) (17 - 18)  SpO2: 99% (05 Jan 2023 10:16) (98% - 99%)    Parameters below as of 05 Jan 2023 10:16  Patient On (Oxygen Delivery Method): room air      I&O's Summary    04 Jan 2023 07:01  -  05 Jan 2023 07:00  --------------------------------------------------------  IN: 3500 mL / OUT: 3525 mL / NET: -25 mL    05 Jan 2023 07:01  -  05 Jan 2023 12:10  --------------------------------------------------------  IN: 430 mL / OUT: 1200 mL / NET: -770 mL    PHYSICAL EXAM:  Gen: Reclining in bed at time of exam, appears stated age  HEENT: NCAT, MMM, feeding tube in nares   Neck: supple, trachea at midline  CV: RRR, +S1/S2  Pulm: adequate respiratory effort, no increase in work of breathing  Abd: soft, ND; soft brown output from ostomy.   Skin: right foot skin warm ; no hives , no new rash   Ext: left foot appearance consistent with dry gangrene. right foot not cold   Neuro: Alert and oriented to person, place, situation,  speaking in full sentences   Psych: dysthymic; behavior appropriate. Unagitated.     BASIC LABS:                        8.9    8.09  )-----------( 331      ( 04 Jan 2023 06:53 )             27.7     01-04    136  |  106  |  11  ----------------------------<  111<H>  3.5   |  21<L>  |  0.61    Ca    8.2<L>      04 Jan 2023 06:53  Phos  2.2     01-04  Mg     1.8     01-04    CAPILLARY BLOOD GLUCOSE    POCT Blood Glucose.: 101 mg/dL (04 Jan 2023 12:24)    OTHER LABS:    MICRODATA:    IMAGING:    EKG:    #Diet - Diet, Regular:   High Fiber (HIFIBER)  Tube Feeding Modality: Nasogastric  Vital 1 Kip (VITAL1)  Total Volume for 24 Hours (mL): 1080  Total Number of Cans: 5  Continuous  Starting Tube Feed Rate mL per Hour: 10  Increase Tube Feed Rate by (mL): 10     Every 3 hours  Until Goal Tube Feed Rate (mL per Hour): 45  Tube Feed Duration (in Hours): 24  Tube Feed Start Time: 08:00  Supplement Feeding Modality:  Oral  Ensure Max Cans or Servings Per Day:  1       Frequency:  Three Times a day (01-03-23 @ 17:04) [Active]    #DVT PPx - enoxaparin Injectable: [Ordered as LOVENOX]  60 milliGRAM(s), SubCutaneous, every 12 hours  Administration Instructions: This is a High Alert Medication. (12-31-22 @ 11:29)

## 2023-01-05 NOTE — CONSULT NOTE ADULT - SUBJECTIVE AND OBJECTIVE BOX
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 Was found to be in rapid atrial flutter, large LV thrombus, bilateral pulmonary emboli (subsegmental). He subsequently developed LLE limb ischemia likely due to embolization of LV thombus. He was initially evaluated for LE embolectomy, however, appeared to recanalize on exam so was deferred. Subsequently developed ischemic limb which was only amenable to amputation. His course was also complicated by acute mesenteric ischemia due to embolic LV thrombus, this occured just prior to planned DCCV of a flutter. He had extensive bowel resection w/ open abdomen and multiple re-look surgeries and eventually abdomen was closed and he was planned for DCCV again. However, when heparin was restarted in anticipation of this he developed hemorrhagic shock and there was evidence of extensive intra-abdominal hemorrhage. Now, he has stabilized from a bleeding perspective, he underwent LORENZO/DCCV on 12/30 and is maintaining sinus rythmn. He is now pre - op from Left LE AKA.    Currently denies chest pain or dyspnea. he is able to lie flat without dyspnea.         ROS: A 10-point review of systems was otherwise negative.    PAST MEDICAL & SURGICAL HISTORY:      SOCIAL HISTORY: cocaine use, heroin use  FAMILY HISTORY: reviewed, non contributory      ALLERGIES: 	  No Known Allergies            MEDICATIONS:  acetaminophen     Tablet .. 650 milliGRAM(s) Oral every 6 hours PRN  aMIOdarone    Tablet 200 milliGRAM(s) Oral daily  ascorbic acid 500 milliGRAM(s) Oral daily  chlorhexidine 2% Cloths 1 Application(s) Topical <User Schedule>  dextrose 5%. 1000 milliLiter(s) IV Continuous <Continuous>  dextrose 5%. 1000 milliLiter(s) IV Continuous <Continuous>  dextrose Oral Gel 15 Gram(s) Oral once PRN  diphenoxylate/atropine 2 Tablet(s) Oral two times a day  dronabinol 5 milliGRAM(s) Oral every 12 hours  enoxaparin Injectable 60 milliGRAM(s) SubCutaneous every 12 hours  glucagon  Injectable 1 milliGRAM(s) IntraMuscular once  influenza  Vaccine (HIGH DOSE) 0.7 milliLiter(s) IntraMuscular once  insulin lispro (ADMELOG) corrective regimen sliding scale   SubCutaneous every 6 hours  loperamide 4 milliGRAM(s) Oral three times a day  metoprolol succinate ER 25 milliGRAM(s) Oral daily  multivitamin 1 Tablet(s) Oral daily  opium Tincture 6 milliGRAM(s) Oral four times a day  oxyCODONE    IR 5 milliGRAM(s) Oral every 4 hours PRN  pantoprazole    Tablet 40 milliGRAM(s) Oral two times a day  psyllium Powder 1 Packet(s) Oral every 12 hours  silver sulfADIAZINE 1% Cream 1 Application(s) Topical daily  zinc sulfate 220 milliGRAM(s) Oral daily      PHYSICAL EXAM:  T(C): 36.6 (01-04-23 @ 21:11), Max: 37.6 (01-04-23 @ 04:27)  HR: 76 (01-04-23 @ 23:50) (70 - 84)  BP: 109/61 (01-04-23 @ 23:50) (103/59 - 135/81)  RR: 18 (01-04-23 @ 23:50) (17 - 18)  SpO2: 98% (01-04-23 @ 23:50) (97% - 100%)  Wt(kg): --    GEN: , comfortable. NAD.   HEENT: No JVD.   RESP: CTA b/l  CV: RRR, normal s1/s2. No m/r/g.  ABD: Soft  EXT: Warm all extremities other than Left lower which is cold. No edema  NEURO: AAOx3.     I&O's Summary    03 Jan 2023 07:01  -  04 Jan 2023 07:00  --------------------------------------------------------  IN: 2405 mL / OUT: 2805 mL / NET: -400 mL    04 Jan 2023 07:01  -  05 Jan 2023 01:43  --------------------------------------------------------  IN: 2785 mL / OUT: 2475 mL / NET: 310 mL        	  LABS:	 	    CARDIAC MARKERS:                                  8.9    8.09  )-----------( 331      ( 04 Jan 2023 06:53 )             27.7     01-04    136  |  106  |  11  ----------------------------<  111<H>  3.5   |  21<L>  |  0.61    Ca    8.2<L>      04 Jan 2023 06:53  Phos  2.2     01-04  Mg     1.8     01-04      proBNP:   Lipid Profile:   HgA1c:   TSH:     TELEMETRY: 	    ECG:  	  RADIOLOGY:   ECHO:  STRESS:  CATH:

## 2023-01-06 NOTE — PROGRESS NOTE ADULT - ASSESSMENT
77 year old man who first presented to [facility] from [facility] due to unresponsiveness (responded to Narcan). At [facility], found to have subsegmental pulmonary embolism in RUL, rapid atrial flutter with severely reduced LVEF with LV thrombus. Transferred to [facility] on 12/7/22 for LORENZO and possible ablation. At [facility], was found to have left leg ischemia (left leg arterial thrombus on LE duplex on 12/8). Was being considered for rhythm control when he developed abdominal pain, CTA (12/10/22) showed embolus in SMA, bowel infarct, requiring ex-lap on 12/11 with SMA embolectomy, 80cm small bowel resection; On 12/13, underwent 2nd look laparotomy, with 80cm small bowel resection, closure with abthera. On 12/15, underwent 3rd look laparotomy, end-to-end anastomosis of remaining bowel, loop ileostomy and abdominal closure. Now extubated, on tele floor. Eventually underwent LORENZO/DCCV on 12/30/22, now in sinus rhythm. Currently being evaluated for possible above knee amputation for LLE ischemia.     # pre-operative assessment   Defer evaluation of cardiac risk to cardiology consult; agree with recommendation for cardiac anesthesia.   Per EP, patient should be on uninterrupted anticoagulation for 30 days after DCCV.   Per cardiology, would not recommend interrupting anticoagulation if pursuing surgery before 30 day duration is complete.  Timing of Above knee amputation per vascular surgery, and surgical team's assessment of bleeding risk, given EP/Cards recommendations for uninterrupted anti-coagulation   Patient is Intermediate to high risk for an intermediate risk procedure. Given the risk of progression of disease with non-intervention, reasonable to proceed with surgery so long as it remains consistent with patient's goals of care.   Internal medicine service will continue to follow. [facility]'s clinical status [facility].     # Left leg ischemia - Decision re: timing of Above Knee Amputation per vascular surgery and primary team     # Acute Systolic Heart Failure - Followed by heart failure team; Euvolemic today. Continue metoprolol succinate 25mg qd   # Atrial Flutter - EP following, now s/p DCCV 12/30. EP recommending uninterrupted AC x 30 days (on enoxaparin 60mg SQ q12). On amiodarone 200mg qd, metoprolol succinate 25 qd   # Pulmonary embolism (subsegmental RUL) - After completion of AC for DCCV, would need to continue AC for PE  # Bowel ischemia - s/p SMA embolectomy; Small bowel resection 80 cm + 40 cm; ileostomy in place; mgmt per primary team   - Monitor ileostomy output on loperamide 4mg TID, tincture of opium QID     # Hypophosphatemia - Phosphorus Level, Serum: 2.1 mg/dL (01-05-23 @ 15:32)  ; Likely 2/2 poor PO intake Replete  # Acute blood loss anemia - Hgb downtrended from time of admission (Hgb 14 --> ~9); partially attributable to operative blood losses (underwent laparotomy x 3); [ ]  recommend checking ferritin, iron sat, repleting if deficient    # Severe protein-calorie malnutrition   - agree with nutrition service's assessment ; supplemental nutrition per nutrition recs   - Getting dronabinol 5mg q12 hrs     GI PPX - on pantoprazole 40mg PO BID   DVT PPX - already on full dose AC with lovenox        77 year old man who first presented to [facility] from [facility] due to unresponsiveness (responded to Narcan). At [facility], found to have subsegmental pulmonary embolism in RUL, rapid atrial flutter with severely reduced LVEF with LV thrombus. Transferred to [facility] on 12/7/22 for LORENZO and possible ablation. At [facility], was found to have left leg ischemia (left leg arterial thrombus on LE duplex on 12/8). Was being considered for rhythm control when he developed abdominal pain, CTA (12/10/22) showed embolus in SMA, bowel infarct, requiring ex-lap on 12/11 with SMA embolectomy, 80cm small bowel resection; On 12/13, underwent 2nd look laparotomy, with 80cm small bowel resection, closure with abthera. On 12/15, underwent 3rd look laparotomy, end-to-end anastomosis of remaining bowel, loop ileostomy and abdominal closure. Now extubated, on tele floor. Eventually underwent LORENZO/DCCV on 12/30/22, now in sinus rhythm. Currently being evaluated for possible above knee amputation for LLE ischemia.     # pre-operative assessment   Defer evaluation of cardiac risk to cardiology consult; agree with recommendation for cardiac anesthesia.   Per EP, patient should be on uninterrupted anticoagulation for 30 days after DCCV.   Per cardiology, would not recommend interrupting anticoagulation if pursuing surgery before 30 day duration is complete.  Timing of Above knee amputation per vascular surgery, and surgical team's assessment of bleeding risk, given EP/Cards recommendations for uninterrupted anti-coagulation   Patient is Intermediate to high risk for an intermediate risk procedure. Given the risk of progression of disease with non-intervention, reasonable to proceed with surgery so long as it remains consistent with patient's goals of care.   Internal medicine service will continue to follow. [facility] if patient's clinical status changes.     # Left leg ischemia - Decision re: timing of Above Knee Amputation per vascular surgery and primary team     # Elevated liver tests --- alk phos, ast, alt mildly elevated. ddx includes mild congestive hepatopathy, amiodarone tox, lovenox related hepatotoxicity, hepatic venous outflow obstruction    [   ] continue to trend daily liver tests; Consider hepatology consult, reassessing volume status, reimaging liver vasculature and trial of different anticoagulant if liver tests continue to uptrend     # Acute Systolic Heart Failure - Followed by heart failure team; Euvolemic today. Continue metoprolol succinate 25mg qd   # Atrial Flutter - EP following, now s/p DCCV 12/30. EP recommending uninterrupted AC x 30 days (on enoxaparin 60mg SQ q12). On amiodarone 200mg qd, metoprolol succinate 25 qd   # Pulmonary embolism (subsegmental RUL) - After completion of AC for DCCV, would need to continue AC for PE  # Bowel ischemia - s/p SMA embolectomy; Small bowel resection 80 cm + 40 cm; ileostomy in place; mgmt per primary team   - Monitor ileostomy output on loperamide 4mg TID, tincture of opium QID     # Hypophosphatemia - Phosphorus Level, Serum: 2.1 mg/dL (01-05-23 @ 15:32)  ; Likely 2/2 poor PO intake Replete  # Acute blood loss anemia - Hgb downtrended from time of admission (Hgb 14 --> ~9); partially attributable to operative blood losses (underwent laparotomy x 3); [ ]  recommend checking ferritin, iron sat, repleting if deficient    # Severe protein-calorie malnutrition   - agree with nutrition service's assessment ; supplemental nutrition per nutrition recs   - Getting dronabinol 5mg q12 hrs     GI PPX - on pantoprazole 40mg PO BID   DVT PPX - already on full dose AC with lovenox        77 year old man who first presented to [facility] from [facility] due to unresponsiveness (responded to Narcan). At [facility], found to have subsegmental pulmonary embolism in RUL, rapid atrial flutter with severely reduced LVEF with LV thrombus. Transferred to [facility] on 12/7/22 for LORENZO and possible ablation. At [facility], was found to have left leg ischemia (left leg arterial thrombus on LE duplex on 12/8). Was being considered for rhythm control when he developed abdominal pain, CTA (12/10/22) showed embolus in SMA, bowel infarct, requiring ex-lap on 12/11 with SMA embolectomy, 80cm small bowel resection; On 12/13, underwent 2nd look laparotomy, with 80cm small bowel resection, closure with abthera. On 12/15, underwent 3rd look laparotomy, end-to-end anastomosis of remaining bowel, loop ileostomy and abdominal closure. Now extubated, on tele floor. Eventually underwent LORENZO/DCCV on 12/30/22, now in sinus rhythm. Currently being evaluated for possible above knee amputation for LLE ischemia.     # pre-operative assessment   Defer evaluation of cardiac risk to cardiology consult; agree with recommendation for cardiac anesthesia.   Per EP, patient should be on uninterrupted anticoagulation for 30 days after DCCV.   Per cardiology, would not recommend interrupting anticoagulation if pursuing surgery before 30 day duration is complete.  Timing of Above knee amputation per vascular surgery, and surgical team's assessment of bleeding risk, given EP/Cards recommendations for uninterrupted anti-coagulation   Patient is Intermediate to high risk for an intermediate risk procedure. Given the risk of progression of disease with non-intervention, reasonable to proceed with surgery so long as it remains consistent with patient's goals of care.   Internal medicine service will continue to follow. [facility] clinical status [facility].     # Left leg ischemia - Decision re: timing of Above Knee Amputation per vascular surgery and primary team     # Elevated liver tests --- alk phos, ast, alt mildly elevated. ddx includes mild congestive hepatopathy, amiodarone tox, lovenox related hepatotoxicity, hepatic venous outflow obstruction    [   ] continue to trend daily liver tests; Consider hepatology consult, reassessing volume status, reimaging liver vasculature and trial of different anticoagulant if liver tests continue to uptrend     # Acute Systolic Heart Failure - Followed by heart failure team; Euvolemic today. Continue metoprolol succinate 25mg qd   # Atrial Flutter - EP following, now s/p DCCV 12/30. EP recommending uninterrupted AC x 30 days (on enoxaparin 60mg SQ q12). On amiodarone 200mg qd, metoprolol succinate 25 qd   # Pulmonary embolism (subsegmental RUL) - After completion of AC for DCCV, would need to continue AC for PE  # Bowel ischemia - s/p SMA embolectomy; Small bowel resection 80 cm + 40 cm; ileostomy in place; mgmt per primary team   - Monitor ileostomy output on loperamide 4mg TID, tincture of opium QID     # Hypophosphatemia - Phosphorus Level, Serum: 2.1 mg/dL (01-05-23 @ 15:32)  ; Likely 2/2 poor PO intake Replete  # Acute blood loss anemia - Hgb downtrended from time of admission (Hgb 14 --> ~9); partially attributable to operative blood losses (underwent laparotomy x 3); [ ]  recommend checking ferritin, iron sat, repleting if deficient    # Severe protein-calorie malnutrition   - agree with nutrition service's assessment ; supplemental nutrition per nutrition recs   - Getting dronabinol 5mg q12 hrs     # Sacral wound - continue off loading, dressings,  [ ] get wound care to reevaluate pressure injury     GI PPX - on pantoprazole 40mg PO BID   DVT PPX - already on full dose AC with lovenox

## 2023-01-06 NOTE — CHART NOTE - NSCHARTNOTEFT_GEN_A_CORE
The writer met with the patient for f/u individual psychotherapy for 20 minutes to process patient's response to current hospitalization. In frustration the patient acknowledges passive thoughts of death, though denies any suicidal intent, expressing a desire to get medically better and leave the hospital.

## 2023-01-06 NOTE — PROGRESS NOTE ADULT - ASSESSMENT
77M with PMHx of IVDU found unresponsive at his nursing home, resolved after Narcan in the field and brought to [facility]. Found to have PE, atrial flutter, and large LV thrombus on echo. Transferred to [facility] for further management. Pt c/o abdominal pain on 12/10 CTA showing mid SMA with embolus. Abnormal distal small bowel loops and cecum with dilatation and pneumatosis suggesting infarcted bowel. One or two tiny foci of intrahepatic portal vein pneumatosis. Segmental infarction upper pole left kidney. Now s/p Ex lap, SMA embolectomy, 80cm SBR, abthera vac left in discontinuity (12/11) and transferred to SICU intubated. S/p second look (12/13) and most recently s/p OR for 3rd look, end-to-end anastomosis of remaining bowel, loop ileostomy and abdomen closure (12/15). Remains in SICU, now extubated with still with limb ischemia to LLE pending amputation. Now stepped down to tele.     Reg/High fiber diet + ensures  Pain/nausea control  Imodium, metamucin, tinctuire of opium for high ileostomy output  A flutter now s/p cardioversion, amio load, now on amio 200 QD, toprolol XL 25 QD  LV thrombus w LLE limb ischemia- Heparin gtt  changed to SQL BID  Planning for AKA with vascular surgery  AM labs

## 2023-01-06 NOTE — PROGRESS NOTE ADULT - SUBJECTIVE AND OBJECTIVE BOX
SUBJECTIVE: Pt seen and examined by chief resident. Pt is doing well, resting comfortably on bed. Reporting some pain from leg. C/o taste of foods but stating that he is going to try and eat more and that he does not want dobhoff replaced.     Vital Signs Last 24 Hrs  T(C): 37.1 (06 Jan 2023 04:00), Max: 37.9 (05 Jan 2023 22:00)  T(F): 98.7 (06 Jan 2023 04:00), Max: 100.3 (05 Jan 2023 22:00)  HR: 82 (06 Jan 2023 04:48) (78 - 92)  BP: 105/65 (06 Jan 2023 04:48) (93/50 - 109/60)  BP(mean): 80 (06 Jan 2023 04:48) (65 - 80)  RR: 18 (06 Jan 2023 04:48) (17 - 18)  SpO2: 98% (06 Jan 2023 04:48) (98% - 99%)    Parameters below as of 06 Jan 2023 04:48  Patient On (Oxygen Delivery Method): room air        I&O's Summary    04 Jan 2023 07:01  -  05 Jan 2023 07:00  --------------------------------------------------------  IN: 3500 mL / OUT: 3525 mL / NET: -25 mL    05 Jan 2023 07:01  -  06 Jan 2023 06:55  --------------------------------------------------------  IN: 1409 mL / OUT: 3550 mL / NET: -2141 mL        Physical Exam:  General Appearance: Appears well, NAD  Pulmonary: Nonlabored breathing, no respiratory distress  Abdomen: Soft, nondistended, nontender. No rebound or guarding. Midline incision healing well without erythema or drainage. Ostomy noted in RLQ to be pink and patent with stool and gas in appliance  Extremities: Cachectic appearing. Dry gangrene noted in LLE from knee and distal    LABS:                        10.6   10.13 )-----------( 370      ( 05 Jan 2023 15:32 )             33.6     01-05    135  |  105  |  11  ----------------------------<  123<H>  4.1   |  20<L>  |  0.60    Ca    8.4      05 Jan 2023 15:32  Phos  2.1     01-05  Mg     1.9     01-05    TPro  6.7  /  Alb  2.5<L>  /  TBili  0.8  /  DBili  x   /  AST  47<H>  /  ALT  48<H>  /  AlkPhos  146<H>  01-05

## 2023-01-06 NOTE — PROGRESS NOTE ADULT - SUBJECTIVE AND OBJECTIVE BOX
Subjective: Pt seen and examined this AM.  Pt resting and denies any complaints states he would "like to be left alone to sleep."  Pt self removed dobhoff feeding tube yesterday. Pts LLE dressing is intact with serous drainage noted. B/L offloading boots intact.     ROS:   Denies Headache, blurred vision, Chest Pain, SOB, Abdominal pain, nausea or vomiting     Social   aMIOdarone    Tablet 200  enoxaparin Injectable 60  metoprolol succinate ER 25      Allergies    No Known Allergies    Intolerances        Vital Signs Last 24 Hrs  T(C): 37.1 (06 Jan 2023 04:00), Max: 37.9 (05 Jan 2023 22:00)  T(F): 98.7 (06 Jan 2023 04:00), Max: 100.3 (05 Jan 2023 22:00)  HR: 78 (06 Jan 2023 08:20) (78 - 92)  BP: 106/66 (06 Jan 2023 08:20) (101/65 - 109/60)  BP(mean): 80 (06 Jan 2023 08:20) (75 - 80)  RR: 17 (06 Jan 2023 08:20) (17 - 18)  SpO2: 99% (06 Jan 2023 08:20) (98% - 99%)    Parameters below as of 06 Jan 2023 08:20  Patient On (Oxygen Delivery Method): room air      I&O's Summary    05 Jan 2023 07:01  -  06 Jan 2023 07:00  --------------------------------------------------------  IN: 1409 mL / OUT: 3650 mL / NET: -2241 mL    06 Jan 2023 07:01  -  06 Jan 2023 09:23  --------------------------------------------------------  IN: 0 mL / OUT: 100 mL / NET: -100 mL        Physical Exam:  General: NAD, resting comfortably in bed  Pulm: Nonlabored breathing, no respiratory distress  Abd: Soft, NTND, Ostomy bag  Extrem: Left lower extremity with no palpable DP/PT pulses, LLE cool to touch, dry gangrene of toes, multiple bullae noted over left foot, lateral and medial leg, skin darkening likely 2/2 to necrosis up to upper 1/3 of leg    LABS:                        10.6   10.13 )-----------( 370      ( 05 Jan 2023 15:32 )             33.6     01-05    135  |  105  |  11  ----------------------------<  123<H>  4.1   |  20<L>  |  0.60    Ca    8.4      05 Jan 2023 15:32  Phos  2.1     01-05  Mg     1.9     01-05    TPro  6.7  /  Alb  2.5<L>  /  TBili  0.8  /  DBili  x   /  AST  47<H>  /  ALT  48<H>  /  AlkPhos  146<H>  01-05          A/P:   77y y/o Male with significant PMHx of IVDU found unresponsive at his nursing home, resolved after Narcan in the field, and brought to [facility]. Found to have PE, atrial flutter, and large LV thrombus on echo. Transferred to [facility] for further management. Pt C/o abdominal pain on 12/10 CTA showing mid SMA with embolus. Abnormal distal small bowel loops and cecum with dilatation and pneumatosis suggesting infarcted bowel. One or two tiny foci of  intrahepatic portal vein pneumatosis. Segmental infarction upper pole left kidney. Now s/p Ex lap, SMA embolectomy, 80cm SBR, abthera vac left in discontinuity (12/11) and transferred to SICU intubated. S/p second look (12/13) and most recently s/p OR for 3rd look, end-to-end anastomosis of remaining bowel, loop ileostomy and abdomen closure (12/15). Remains in SICU now extubated with limb ischemia to LLE pending amputation. Ischemia has not yet fully demarcated, it is clear that the posterior calf (which is needed to heal a BKA flap) is involved and therefore only surgical option available to the patient is an AKA pending medical optimization.     Recommendations:    - No concerns for wet gangrene of Left LE 1/6  - Plan for AKA once patient's clinical and nutritional status has improved  - Appreciate cardiology/ep recs   - Appreciate Hospitalist recs for preop clearance  - Continue local wound care with silverdine to blisters and kerlex  - Continue supportive measures  - Rest of care per primary team   - Vascular surgery Team 3C will continue to follow. Please call [phone] with any questions or concerns.

## 2023-01-06 NOTE — CHART NOTE - NSCHARTNOTEFT_GEN_A_CORE
Admitting Diagnosis:   Patient is a 77y old  Male who presents with a chief complaint of A flutter (2023 12:24)    PAST MEDICAL & SURGICAL HISTORY:    Current Nutrition Order:  Regular HIGH fiber Diet, with Ensure Enlive TID (1050 kcal, 60g protein, 540mL free H2O)     PO Intake: Good (%) [   ]  Fair (50-75%) [   ] Poor (<25%) [ X  ]- please see subjective. Calorie count started  with plan to finish - RD to follow  to assess results.   --Marinol is now ordered      GI Issues:   WDL, last BM   No n/v/d/c  No abd distention or discomfort.   Ileostomy (3100 ml/24hrs)  **ordered for imodium, metamucil, lomotil    Pain:  8/10 sacral spine pain noted- currently on pain regimen    Skin Integrity:  Surgical incision, carla score 13  PU; Stg III to lumbar spine, Unstageable to sacrum  Edema; +2 edema to right knee and ankle    Labs:       135  |  105  |  11  ----------------------------<  123<H>  4.1   |  20<L>  |  0.60    Ca    8.4      2023 15:32  Phos  2.1       Mg     1.9         TPro  6.7  /  Alb  2.5<L>  /  TBili  0.8  /  DBili  x   /  AST  47<H>  /  ALT  48<H>  /  AlkPhos  146<H>      Medications:  MEDICATIONS  (STANDING):  aMIOdarone    Tablet 200 milliGRAM(s) Oral daily  ascorbic acid 500 milliGRAM(s) Oral daily  chlorhexidine 2% Cloths 1 Application(s) Topical <User Schedule>  dextrose 5%. 1000 milliLiter(s) (50 mL/Hr) IV Continuous <Continuous>  dextrose 5%. 1000 milliLiter(s) (100 mL/Hr) IV Continuous <Continuous>  diphenoxylate/atropine 2 Tablet(s) Oral three times a day  dronabinol 5 milliGRAM(s) Oral every 12 hours  enoxaparin Injectable 60 milliGRAM(s) SubCutaneous every 12 hours  glucagon  Injectable 1 milliGRAM(s) IntraMuscular once  influenza  Vaccine (HIGH DOSE) 0.7 milliLiter(s) IntraMuscular once  insulin lispro (ADMELOG) corrective regimen sliding scale   SubCutaneous every 6 hours  loperamide 4 milliGRAM(s) Oral three times a day  metoprolol succinate ER 25 milliGRAM(s) Oral daily  multivitamin 1 Tablet(s) Oral daily  opium Tincture 6 milliGRAM(s) Oral four times a day  pantoprazole    Tablet 40 milliGRAM(s) Oral two times a day  psyllium Powder 1 Packet(s) Oral every 12 hours  silver sulfADIAZINE 1% Cream 1 Application(s) Topical daily  zinc sulfate 220 milliGRAM(s) Oral daily    MEDICATIONS  (PRN):  acetaminophen     Tablet .. 650 milliGRAM(s) Oral every 6 hours PRN Temp greater or equal to 38C (100.4F), Mild Pain (1 - 3)  dextrose Oral Gel 15 Gram(s) Oral once PRN Blood Glucose LESS THAN 70 milliGRAM(s)/deciliter  oxyCODONE    IR 5 milliGRAM(s) Oral every 4 hours PRN Severe Pain (7 - 10)    Admitting Anthropometrics:    pounds +-10%  Wt 142 pounds BMI 16.4 %IBW=66%     Weight Change:    141.9 pounds - dosing wt    136 pounds - Bedscale wt     **PCM:  >> Reports having 1-2 meals/day + ONS. Per pt claims to have 2 ensure/day and 2 nutrament/day - unclear how accurate this is. ?If pt meeting ~75% EER consistently.  >> Does report wt loss.  pounds x6 months ago. Thinks he now is 135 pounds which is consistent with bedscale wt obtained during initial visit by  pounds. Suggest wt loss of 49 pounds/26% body wt loss.  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     Estimated energy needs:  Current body wt for EER based on clinician judgment. Adjust for age, malnutrition, EF, S/p OR, Pressure ulcer; fluids per team  25-30kcal/k-1950kcal/day   1.3-1.5gm/k-98gm prot/day   **Rec upper end of needs     Subjective:  77M with PMHx of IVDU found unresponsive at his nursing home, resolved after Narcan in the field and brought to [facility]. Found to have PE, atrial flutter, and large LV thrombus on echo. Transferred to [facility] for further management. Pt c/o abdominal pain on 12/10 CTA showing mid SMA with embolus. Abnormal distal small bowel loops and cecum with dilatation and pneumatosis suggesting infarcted bowel. One or two tiny foci of intrahepatic portal vein pneumatosis. Segmental infarction upper pole left kidney. Now s/p Ex lap, SMA embolectomy, 80cm SBR, abthera vac left in discontinuity () and transferred to SICU intubated. S/p second look () and most recently s/p OR for 3rd look, end-to-end anastomosis of remaining bowel, loop ileostomy and abdomen closure (12/15). Transferred to CCU on  for cardiac optimization and now transferred back to SICU  for bleeding hemodynamic instability. Echo  shows EF 10-15% with overall hypokinesis. CTA performed demonstrating large hematoma in R abdomen, new hemoperitoneum, and bilateral pleural effusions. Remains in SICU, now extubated with still with limb ischemia to LLE pending amputation and AC held given BRBPR and hematoma; noted pt agreeable for AKA when feasible - AKA currently Pending Cards. Pt s/p DCCV on  (negative LORENZO on ) with successful conversion to NSR.     On assessment, pt resting in bed. Currently on regular diet with Ensure Enlive TID (1050 kcal, 60g protein, 540mL free H2O). Pt with continued poor PO intake- consumed x1 small item off tray and 75% of greater of Ensure each meal. Kcal count initiated  and RD to follow up  to assess results. Pt cont to dislike most food- appears to be very picky, cont to encourage as tolerated and have family bring in food from home. Cont to encourage Ensure intake. Pt noted to dislike NGT placement- if cont to have poor PO intake, would consider placement of long term EN feeding tube given prolonged inadequate intake/diet during admit, in pt who is malnourished with Skin Breakdown, high Ostomy OP who is now Pending further OR interventions. RD to follow.     Prior PES: Malnutrition Severe in Chronic state RT presumed intake<EER AEB NFPE, wt loss, PO intake  >>on going  Goal: Pt will meet at least 75% of nutrient needs via feasible route / Show no further s/s of malnutrition    Recommendations:  1. Cont with regular diet with Ensure Enlive TID (1050 kcal, 60g protein, 540mL free H2O)  2. Monitor Diet tolerance, %PO intake. As feasible, Cont with appetite stimulant.    3. Would STRONGLY consider use of alternate means of nutrition given prolonged inadequate intake/diet during admit, in pt who is malnourished with Skin Breakdown, high Ostomy OP who is now Pending further OR interventions.     >>If team wishes to initiate EN feeds, recommend: Vital 1.5 @45ml/hr x24hrs will provide ~1080ml, 1620kcal, 73gm prot, 825ml water. This will meet ~99% lower end kcal needs and 86% lower end prot needs.   >>Start at 10ml/hr, increase 10ml q8hrs or as tolerated. Check lytes and replete prior to starting feeds. Can consider thiamine use in setting of malnutrition.   >>Closely monitor for tolerance. Should pt cont with high Ostomy OP and PN to begin please reconsult for Recs.   4. Monitor Skin, Wts daily, GOC. Pain/GI per team.   5. Labs: monitor BMP, CBC, glucose, lytes - Replete PRN, trend renal indices, LFts, POCT.  6. RD to remain available for additional Recs.      Education:  Deferred at this time    Risk Level: High [X   ] Moderate [   ] Low [   ].

## 2023-01-06 NOTE — PROGRESS NOTE ADULT - SUBJECTIVE AND OBJECTIVE BOX
Patient is a 77y old  Male who presents with a chief complaint of A flutter (06 Jan 2023 09:22)    INTERVAL EVENTS:  Patient self-removed Dobhoff tube overnight, says that he removed it because it was causing him discomfort.   Tolerated regular texture  breakfast ;     SUBJECTIVE:  Patient was seen and examined at bedside.  Urinating without dysuria, or difficulty ; complaining of left leg pain. no nausea.     MEDICATIONS:  MEDICATIONS  (STANDING):  aMIOdarone    Tablet 200 milliGRAM(s) Oral daily  ascorbic acid 500 milliGRAM(s) Oral daily  chlorhexidine 2% Cloths 1 Application(s) Topical <User Schedule>  dextrose 5%. 1000 milliLiter(s) (50 mL/Hr) IV Continuous <Continuous>  dextrose 5%. 1000 milliLiter(s) (100 mL/Hr) IV Continuous <Continuous>  diphenoxylate/atropine 2 Tablet(s) Oral three times a day  dronabinol 5 milliGRAM(s) Oral every 12 hours  enoxaparin Injectable 60 milliGRAM(s) SubCutaneous every 12 hours  glucagon  Injectable 1 milliGRAM(s) IntraMuscular once  influenza  Vaccine (HIGH DOSE) 0.7 milliLiter(s) IntraMuscular once  insulin lispro (ADMELOG) corrective regimen sliding scale   SubCutaneous every 6 hours  loperamide 4 milliGRAM(s) Oral three times a day  metoprolol succinate ER 25 milliGRAM(s) Oral daily  multivitamin 1 Tablet(s) Oral daily  opium Tincture 6 milliGRAM(s) Oral four times a day  pantoprazole    Tablet 40 milliGRAM(s) Oral two times a day  psyllium Powder 1 Packet(s) Oral every 12 hours  silver sulfADIAZINE 1% Cream 1 Application(s) Topical daily  zinc sulfate 220 milliGRAM(s) Oral daily    MEDICATIONS  (PRN):  acetaminophen     Tablet .. 650 milliGRAM(s) Oral every 6 hours PRN Temp greater or equal to 38C (100.4F), Mild Pain (1 - 3)  dextrose Oral Gel 15 Gram(s) Oral once PRN Blood Glucose LESS THAN 70 milliGRAM(s)/deciliter  oxyCODONE    IR 5 milliGRAM(s) Oral every 4 hours PRN Severe Pain (7 - 10)    Allergies    No Known Allergies    Intolerances    OBJECTIVE:  Vital Signs Last 24 Hrs  T(C): 36.9 (06 Jan 2023 09:29), Max: 37.9 (05 Jan 2023 22:00)  T(F): 98.4 (06 Jan 2023 09:29), Max: 100.3 (05 Jan 2023 22:00)  HR: 78 (06 Jan 2023 08:20) (78 - 92)  BP: 106/66 (06 Jan 2023 08:20) (104/59 - 109/60)  BP(mean): 80 (06 Jan 2023 08:20) (75 - 80)  RR: 17 (06 Jan 2023 08:20) (17 - 18)  SpO2: 99% (06 Jan 2023 08:20) (98% - 99%)    Parameters below as of 06 Jan 2023 08:20  Patient On (Oxygen Delivery Method): room air      I&O's Summary    05 Jan 2023 07:01  -  06 Jan 2023 07:00  --------------------------------------------------------  IN: 1409 mL / OUT: 3650 mL / NET: -2241 mL    06 Jan 2023 07:01  -  06 Jan 2023 12:16  --------------------------------------------------------  IN: 0 mL / OUT: 100 mL / NET: -100 mL    PHYSICAL EXAM:  Gen: Reclining in bed at time of exam, appears stated age  HEENT: NCAT, MMM, temporal wasting present, cachectic   Neck: supple, trachea at midline  CV: RRR, +S1/S2  Pulm: adequate respiratory effort, no increase in work of breathing  Abd: soft, ND; soft brown output from ostomy.   Skin: right foot skin warm ; no hives , no new rash   Ext: left foot appearance consistent with dry gangrene. right foot not cold   Neuro: Alert and oriented to person, place, situation,  speaking in full sentences   Psych: dysthymic; behavior appropriate. Unagitated.   /Rectal: No bowen, no rectal tube       LABS:                        10.6   10.13 )-----------( 370      ( 05 Jan 2023 15:32 )             33.6     01-05    135  |  105  |  11  ----------------------------<  123<H>  4.1   |  20<L>  |  0.60    Ca    8.4      05 Jan 2023 15:32  Phos  2.1     01-05  Mg     1.9     01-05    TPro  6.7  /  Alb  2.5<L>  /  TBili  0.8  /  DBili  x   /  AST  47<H>  /  ALT  48<H>  /  AlkPhos  146<H>  01-05    LIVER FUNCTIONS - ( 05 Jan 2023 15:32 )  Alb: 2.5 g/dL / Pro: 6.7 g/dL / ALK PHOS: 146 U/L / ALT: 48 U/L / AST: 47 U/L / GGT: x             CAPILLARY BLOOD GLUCOSE            MICRODATA:      RADIOLOGY/OTHER STUDIES:

## 2023-01-07 NOTE — PROGRESS NOTE ADULT - SUBJECTIVE AND OBJECTIVE BOX
S: Complaining about the food. In moderate spirits. No acute medical complaints. Having some pain in the middle of his shin.    O: ICU Vital Signs Last 24 Hrs  T(C): 36.9 (07 Jan 2023 13:07), Max: 37.4 (07 Jan 2023 05:00)  T(F): 98.5 (07 Jan 2023 13:07), Max: 99.4 (07 Jan 2023 05:00)  HR: 92 (07 Jan 2023 11:45) (80 - 92)  BP: 100/55 (07 Jan 2023 11:45) (100/55 - 116/67)  BP(mean): 70 (07 Jan 2023 11:45) (70 - 85)  ABP: --  ABP(mean): --  RR: 17 (07 Jan 2023 11:45) (16 - 18)  SpO2: 98% (07 Jan 2023 11:45) (96% - 99%)    O2 Parameters below as of 07 Jan 2023 11:45  Patient On (Oxygen Delivery Method): room air    Exam:     General: Emaciated man, in hospital bed.   Extremity: LLE w/o doppler or palpable signals. 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 Some tenderness present when lifting with posterior calf. Dry gagrene of toes. Area of mascerated tissue which is foul smelling at heel. Covered with betadine and redressed in kerlix.                  10.0   10.69 )-----------( 403      ( 07 Jan 2023 13:02 )             31.9     01-07    136  |  107  |  14  ----------------------------<  130<H>  4.0   |  23  |  0.64    Ca    8.4      07 Jan 2023 13:02  Phos  2.4     01-07  Mg     1.9     01-07    TPro  6.7  /  Alb  2.7<L>  /  TBili  0.7  /  DBili  x   /  AST  42<H>  /  ALT  58<H>  /  AlkPhos  150<H>  01-07    A/P:   77y y/o Male with significant PMHx of IVDU found unresponsive at his nursing home, resolved after Narcan in the field, and brought to [facility]. Found to have PE, atrial flutter, and large LV thrombus on echo. Transferred to [facility] for further management. Pt C/o abdominal pain on 12/10 CTA showing mid SMA with embolus. Abnormal distal small bowel loops and cecum with dilatation and pneumatosis suggesting infarcted bowel. One or two tiny foci of  intrahepatic portal vein pneumatosis. Segmental infarction upper pole left kidney. Now s/p Ex lap, SMA embolectomy, 80cm SBR, abthera vac left in discontinuity (12/11) and transferred to SICU intubated. S/p second look (12/13) and most recently s/p OR for 3rd look, end-to-end anastomosis of remaining bowel, loop ileostomy and abdomen closure (12/15). Now stepped down with limb ischemia to LLE pending amputation. Ischemia has not yet fully demarcated, it is clear that the posterior calf (which is needed to heal a BKA flap) is involved and therefore only surgical option available to the patient is an AKA pending medical optimization. New today some area of concern at his posterior heel; will watch carefully.     Recommendations:    - No concerns for wet gangrene of Left LE 1/6  - Plan for AKA once patient's clinical and nutritional status has improved  - Appreciate cardiology/ep recs   - Appreciate Hospitalist recs for preop clearance  - Continue local wound care with betadine to all skin below the line of demarcation of his left shin and kerlix.   - Continue supportive measures  - Rest of care per primary team   - Vascular surgery Team 3C will continue to follow. Please call [phone] with any questions or concerns.   S: Complaining about the food. In moderate spirits. No acute medical complaints. Having some pain in the middle of his shin.    O: ICU Vital Signs Last 24 Hrs  T(C): 36.9 (07 Jan 2023 13:07), Max: 37.4 (07 Jan 2023 05:00)  T(F): 98.5 (07 Jan 2023 13:07), Max: 99.4 (07 Jan 2023 05:00)  HR: 92 (07 Jan 2023 11:45) (80 - 92)  BP: 100/55 (07 Jan 2023 11:45) (100/55 - 116/67)  BP(mean): 70 (07 Jan 2023 11:45) (70 - 85)  ABP: --  ABP(mean): --  RR: 17 (07 Jan 2023 11:45) (16 - 18)  SpO2: 98% (07 Jan 2023 11:45) (96% - 99%)    O2 Parameters below as of 07 Jan 2023 11:45  Patient On (Oxygen Delivery Method): room air    Exam:     General: Emaciated man, in hospital bed.   Extremity: LLE w/o doppler or palpable signals. 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 Some tenderness present when lifting with posterior calf. Dry gagrene of toes. Area of mascerated tissue which is foul smelling at heel. Covered with betadine and redressed in kerlix.                  10.0   10.69 )-----------( 403      ( 07 Jan 2023 13:02 )             31.9     01-07    136  |  107  |  14  ----------------------------<  130<H>  4.0   |  23  |  0.64    Ca    8.4      07 Jan 2023 13:02  Phos  2.4     01-07  Mg     1.9     01-07    TPro  6.7  /  Alb  2.7<L>  /  TBili  0.7  /  DBili  x   /  AST  42<H>  /  ALT  58<H>  /  AlkPhos  150<H>  01-07    A/P:   77y y/o Male with significant PMHx of IVDU found unresponsive at his nursing home, resolved after Narcan in the field, and brought to [facility]. Found to have PE, atrial flutter, and large LV thrombus on echo. Transferred to [facility] for further management. Pt C/o abdominal pain on 12/10 CTA showing mid SMA with embolus. Abnormal distal small bowel loops and cecum with dilatation and pneumatosis suggesting infarcted bowel. One or two tiny foci of  intrahepatic portal vein pneumatosis. Segmental infarction upper pole left kidney. Now s/p Ex lap, SMA embolectomy, 80cm SBR, abthera vac left in discontinuity (12/11) and transferred to SICU intubated. S/p second look (12/13) and most recently s/p OR for 3rd look, end-to-end anastomosis of remaining bowel, loop ileostomy and abdomen closure (12/15). Now stepped down with limb ischemia to LLE pending amputation. Ischemia has not yet fully demarcated, it is clear that the posterior calf (which is needed to heal a BKA flap) is involved and therefore only surgical option available to the patient is an AKA pending medical optimization. New today some area of concern at his posterior heel; will watch carefully.     Recommendations:    - Plan for AKA once patient's clinical and nutritional status has improved  - Appreciate cardiology/ep recs   - Appreciate Hospitalist recs for preop clearance  - Continue local wound care with betadine to all skin below the line of demarcation of his left shin and kerlix. I removed his pressure offloading boot, which seemed to be creating an area where moisture could accumulate around his heel, and supported his leg with pillows.   - Continue supportive measures  - Rest of care per primary team   - Vascular surgery Team 3C will continue to follow. Please call [phone] with any questions or concerns.

## 2023-01-07 NOTE — PROGRESS NOTE ADULT - SUBJECTIVE AND OBJECTIVE BOX
Patient is a 77y old  Male who presents with a chief complaint of A flutter (07 Jan 2023 07:15)      INTERVAL HPI/OVERNIGHT EVENTS:      REVIEW OF SYSTEMS:  CONSTITUTIONAL: No fever, weight loss, or fatigue  EYES: No eye pain, visual disturbances, or discharge  ENMT:  No difficulty hearing, tinnitus, vertigo; No sinus or throat pain  NECK: No pain or stiffness  BREASTS: No pain, masses, or nipple discharge  RESPIRATORY: No cough, wheezing, chills or hemoptysis; No shortness of breath  CARDIOVASCULAR: No chest pain, palpitations, dizziness, or leg swelling  GASTROINTESTINAL: No abdominal or epigastric pain. No nausea, vomiting, or hematemesis; No diarrhea or constipation. No melena or hematochezia.  GENITOURINARY: No dysuria, frequency, hematuria, or incontinence  NEUROLOGICAL: No headaches, memory loss, loss of strength, numbness, or tremors  SKIN: No itching, burning, rashes, or lesions   LYMPH NODES: No enlarged glands  ENDOCRINE: No heat or cold intolerance; No hair loss  MUSCULOSKELETAL: No joint pain or swelling; No muscle, back, or extremity pain  HEME/LYMPH: No easy bruising, or bleeding gums  ALLERY AND IMMUNOLOGIC: No hives or eczema    FAMILY HISTORY:    Vital Signs Last 24 Hrs  T(C): 37.1 (07 Jan 2023 09:00), Max: 37.4 (07 Jan 2023 05:00)  T(F): 98.7 (07 Jan 2023 09:00), Max: 99.4 (07 Jan 2023 05:00)  HR: 86 (07 Jan 2023 08:28) (78 - 88)  BP: 104/60 (07 Jan 2023 08:28) (101/66 - 116/67)  BP(mean): 76 (07 Jan 2023 08:28) (76 - 85)  RR: 16 (07 Jan 2023 08:28) (16 - 18)  SpO2: 97% (07 Jan 2023 08:28) (96% - 99%)    Parameters below as of 07 Jan 2023 08:28  Patient On (Oxygen Delivery Method): room air        01-06-23 @ 07:01  -  01-07-23 @ 07:00  --------------------------------------------------------  IN: 490 mL / OUT: 3025 mL / NET: -2535 mL    01-07-23 @ 07:01  -  01-07-23 @ 11:22  --------------------------------------------------------  IN: 0 mL / OUT: 250 mL / NET: -250 mL        PHYSICAL EXAM:  GENERAL: NAD, well-groomed, well-developed  HEAD:  Atraumatic, Normocephalic  EYES: EOMI, PERRLA, conjunctiva and sclera clear  ENMT: No tonsillar erythema, exudates, or enlargement; Moist mucous membranes, Good dentition, No lesions  NECK: Supple, No JVD, Normal thyroid  NERVOUS SYSTEM:  Alert & Oriented X3, Good concentration; Motor Strength 5/5 B/L upper and lower extremities; DTRs 2+ intact and symmetric  CHEST/LUNG: Clear to percussion bilaterally; No rales, rhonchi, wheezing, or rubs  HEART: Regular rate and rhythm; No murmurs, rubs, or gallops  ABDOMEN: Soft, Nontender, Nondistended; Bowel sounds present  EXTREMITIES:  2+ Peripheral Pulses, No clubbing, cyanosis, or edema  LYMPH: No lymphadenopathy noted  SKIN: No rashes or lesions    Consultant(s) Notes Reviewed:  [x ] YES  [ ] NO  Care Discussed with Consultants/Other Providers [ x] YES  [ ] NO    LABS:        RADIOLOGY & ADDITIONAL TESTS:    Imaging Personally Reviewed:  [ ] YES  [ ] NO  acetaminophen     Tablet .. 650 milliGRAM(s) Oral every 6 hours PRN  aMIOdarone    Tablet 200 milliGRAM(s) Oral daily  ascorbic acid 500 milliGRAM(s) Oral daily  chlorhexidine 2% Cloths 1 Application(s) Topical <User Schedule>  dextrose 5%. 1000 milliLiter(s) IV Continuous <Continuous>  dextrose 5%. 1000 milliLiter(s) IV Continuous <Continuous>  dextrose Oral Gel 15 Gram(s) Oral once PRN  diphenoxylate/atropine 2 Tablet(s) Oral three times a day  dronabinol 5 milliGRAM(s) Oral every 12 hours  enoxaparin Injectable 60 milliGRAM(s) SubCutaneous every 12 hours  glucagon  Injectable 1 milliGRAM(s) IntraMuscular once  influenza  Vaccine (HIGH DOSE) 0.7 milliLiter(s) IntraMuscular once  insulin lispro (ADMELOG) corrective regimen sliding scale   SubCutaneous every 6 hours  lactated ringers. 500 milliLiter(s) IV Continuous <Continuous>  loperamide 4 milliGRAM(s) Oral three times a day  metoprolol succinate ER 25 milliGRAM(s) Oral daily  multivitamin 1 Tablet(s) Oral daily  opium Tincture 6 milliGRAM(s) Oral four times a day  oxyCODONE    IR 5 milliGRAM(s) Oral every 4 hours PRN  pantoprazole    Tablet 40 milliGRAM(s) Oral two times a day  psyllium Powder 1 Packet(s) Oral every 12 hours  silver sulfADIAZINE 1% Cream 1 Application(s) Topical daily  zinc sulfate 220 milliGRAM(s) Oral daily      HEALTH ISSUES - PROBLEM Dx:  Abdominal pain    Encounter for palliative care    Therapeutic opioid induced constipation    Difficulty coping with disease    Heart failure    Advance care planning    Debility    Acute mesenteric ischemia    Septic shock    Limb ischemia    Poor appetite    Hemorrhagic shock    Acute systolic congestive heart failure    Atrial flutter    Mesenteric ischemia    GI bleed    Lower limb ischemia    Severe protein-calorie malnutrition           Patient is a 77y old  Male who presents with a chief complaint of A flutter (07 Jan 2023 07:15)      INTERVAL HPI/OVERNIGHT EVENTS:  Pt states Left leg stop working and it need to be cut off. He refused pulse ox and removed. Complaint that food is not good and chicken is too hard, that's why he doesn't want to eat much. Pt states his butt hurts.      REVIEW OF SYSTEMS: as above. otherwise negative    FAMILY HISTORY:      PHYSICAL EXAM:  GENERAL: NAD, well-groomed, thin  HEAD:  Atraumatic, Normocephalic  EYES: EOMI, conjunctiva and sclera clear  ENMT: No tonsillar erythema, exudates, or enlargement; Moist mucous membranes  NECK: Supple, No JVD, Normal thyroid  NERVOUS SYSTEM:  Alert & Oriented X3, Good concentration; Moving all extremities except LLE  CHEST/LUNG: No rales, rhonchi, wheezing, or rubs +perm cath on R chest  HEART: Regular rate and rhythm; No murmurs, rubs, or gallops  ABDOMEN: Soft, Nontender, Nondistended; Bowel sounds present +ileostomy  EXTREMITIES:  2+ Peripheral Pulses, No clubbing, cyanosis, or edema +dry gangrene on LLE  LYMPH: No lymphadenopathy noted  SKIN: + dry gangrene on LLE      FAMILY HISTORY:    Vital Signs Last 24 Hrs  T(C): 37.1 (07 Jan 2023 09:00), Max: 37.4 (07 Jan 2023 05:00)  T(F): 98.7 (07 Jan 2023 09:00), Max: 99.4 (07 Jan 2023 05:00)  HR: 86 (07 Jan 2023 08:28) (78 - 88)  BP: 104/60 (07 Jan 2023 08:28) (101/66 - 116/67)  BP(mean): 76 (07 Jan 2023 08:28) (76 - 85)  RR: 16 (07 Jan 2023 08:28) (16 - 18)  SpO2: 97% (07 Jan 2023 08:28) (96% - 99%)    Parameters below as of 07 Jan 2023 08:28  Patient On (Oxygen Delivery Method): room air        01-06-23 @ 07:01  -  01-07-23 @ 07:00  --------------------------------------------------------  IN: 490 mL / OUT: 3025 mL / NET: -2535 mL    01-07-23 @ 07:01  -  01-07-23 @ 11:22  --------------------------------------------------------  IN: 0 mL / OUT: 250 mL / NET: -250 mL      stephany  SKIN: No rashes or lesions    Consultant(s) Notes Reviewed:  [x ] YES  [ ] NO  Care Discussed with Consultants/Other Providers [ x] YES  [ ] NO    LABS:        RADIOLOGY & ADDITIONAL TESTS:    Imaging Personally Reviewed:  [ ] YES  [ ] NO  acetaminophen     Tablet .. 650 milliGRAM(s) Oral every 6 hours PRN  aMIOdarone    Tablet 200 milliGRAM(s) Oral daily  ascorbic acid 500 milliGRAM(s) Oral daily  chlorhexidine 2% Cloths 1 Application(s) Topical <User Schedule>  dextrose 5%. 1000 milliLiter(s) IV Continuous <Continuous>  dextrose 5%. 1000 milliLiter(s) IV Continuous <Continuous>  dextrose Oral Gel 15 Gram(s) Oral once PRN  diphenoxylate/atropine 2 Tablet(s) Oral three times a day  dronabinol 5 milliGRAM(s) Oral every 12 hours  enoxaparin Injectable 60 milliGRAM(s) SubCutaneous every 12 hours  glucagon  Injectable 1 milliGRAM(s) IntraMuscular once  influenza  Vaccine (HIGH DOSE) 0.7 milliLiter(s) IntraMuscular once  insulin lispro (ADMELOG) corrective regimen sliding scale   SubCutaneous every 6 hours  lactated ringers. 500 milliLiter(s) IV Continuous <Continuous>  loperamide 4 milliGRAM(s) Oral three times a day  metoprolol succinate ER 25 milliGRAM(s) Oral daily  multivitamin 1 Tablet(s) Oral daily  opium Tincture 6 milliGRAM(s) Oral four times a day  oxyCODONE    IR 5 milliGRAM(s) Oral every 4 hours PRN  pantoprazole    Tablet 40 milliGRAM(s) Oral two times a day  psyllium Powder 1 Packet(s) Oral every 12 hours  silver sulfADIAZINE 1% Cream 1 Application(s) Topical daily  zinc sulfate 220 milliGRAM(s) Oral daily      HEALTH ISSUES - PROBLEM Dx:  Abdominal pain    Encounter for palliative care    Therapeutic opioid induced constipation    Difficulty coping with disease    Heart failure    Advance care planning    Debility    Acute mesenteric ischemia    Septic shock    Limb ischemia    Poor appetite    Hemorrhagic shock    Acute systolic congestive heart failure    Atrial flutter    Mesenteric ischemia    GI bleed    Lower limb ischemia    Severe protein-calorie malnutrition

## 2023-01-07 NOTE — PROGRESS NOTE ADULT - ASSESSMENT
77M with PMHx of IVDU found unresponsive at his nursing home, resolved after Narcan in the field and brought to [facility]. Found to have PE, atrial flutter, and large LV thrombus on echo. Transferred to [facility] for further management. Pt c/o abdominal pain on 12/10 CTA showing mid SMA with embolus. Abnormal distal small bowel loops and cecum with dilatation and pneumatosis suggesting infarcted bowel. One or two tiny foci of intrahepatic portal vein pneumatosis. Segmental infarction upper pole left kidney. Now s/p Ex lap, SMA embolectomy, 80cm SBR, abthera vac left in discontinuity (12/11) and transferred to SICU intubated. S/p second look (12/13) and most recently s/p OR for 3rd look, end-to-end anastomosis of remaining bowel, loop ileostomy and abdomen closure (12/15). Remains in SICU, now extubated with still with limb ischemia to LLE pending amputation. Now stepped down to tele.     Reg/High fiber diet + ensures  Calorie count  Replete ostomy output 1:2 q12h  Psych consult Monday AM  Pain/nausea control  Imodium, metamucin, tinctuire of opium for high ileostomy output  A flutter now s/p cardioversion, amio load, now on amio 200 QD, toprolol XL 25 QD  LV thrombus w LLE limb ischemia- Heparin gtt  changed to SQL BID  Planning for AKA with vascular surgery  AM labs

## 2023-01-07 NOTE — PROGRESS NOTE ADULT - ASSESSMENT
77 year old man who first presented to [facility] from [facility] due to unresponsiveness (responded to Narcan). At [facility], found to have subsegmental pulmonary embolism in RUL, rapid atrial flutter with severely reduced LVEF with LV thrombus. Transferred to [facility] on 12/7/22 for LORENZO and possible ablation. At [facility], was found to have left leg ischemia (left leg arterial thrombus on LE duplex on 12/8). Was being considered for rhythm control when he developed abdominal pain, CTA (12/10/22) showed embolus in SMA, bowel infarct, requiring ex-lap on 12/11 with SMA embolectomy, 80cm small bowel resection; On 12/13, underwent 2nd look laparotomy, with 80cm small bowel resection, closure with abthera. On 12/15, underwent 3rd look laparotomy, end-to-end anastomosis of remaining bowel, loop ileostomy and abdominal closure. Now extubated, on tele floor. Eventually underwent LORENZO/DCCV on 12/30/22, now in sinus rhythm. Currently being evaluated for possible above knee amputation for LLE ischemia.     # pre-operative assessment   Defer evaluation of cardiac risk to cardiology consult; agree with recommendation for cardiac anesthesia.   Per EP, patient should be on uninterrupted anticoagulation for 30 days after DCCV.   Per cardiology, would not recommend interrupting anticoagulation if pursuing surgery before 30 day duration is complete.  Timing of Above knee amputation per vascular surgery, and surgical team's assessment of bleeding risk, given EP/Cards recommendations for uninterrupted anti-coagulation   Patient is Intermediate to high risk for an intermediate risk procedure. Given the risk of progression of disease with non-intervention, reasonable to proceed with surgery so long as it remains consistent with patient's goals of care.   Internal medicine service will continue to follow. [facility]'s clinical status changes.     # Left leg ischemia - Decision re: timing of Above Knee Amputation per vascular surgery and primary team. Awaiting for being nutritionally optimized    # Elevated liver tests --- alk phos, ast, alt mildly elevated. ddx includes mild congestive hepatopathy, amiodarone tox, lovenox related hepatotoxicity, hepatic venous outflow obstruction    continue to trend daily liver tests; Consider hepatology consult, reassessing volume status, reimaging liver vasculature and trial of different anticoagulant if liver tests continue to uptrend - currently stable    # Acute Systolic Heart Failure - Followed by heart failure team; Euvolemic today. Continue metoprolol succinate 25mg qd   # Atrial Flutter - EP following, now s/p DCCV 12/30. EP recommending uninterrupted AC x 30 days (on enoxaparin 60mg SQ q12). On amiodarone 200mg qd, metoprolol succinate 25 qd   # Pulmonary embolism (subsegmental RUL) - After completion of AC for DCCV, would need to continue AC for PE  # Bowel ischemia - s/p SMA embolectomy; Small bowel resection 80 cm + 40 cm; ileostomy in place; mgmt per primary team   - Monitor ileostomy output on loperamide 4mg TID, tincture of opium QID     # Hypophosphatemia - Phosphorus Level, Serum: 2.4 ; Likely 2/2 poor PO intake Replete  # Acute blood loss anemia - Hgb downtrended from time of admission (Hgb 14 --> ~9); partially attributable to operative blood losses (underwent laparotomy x 3); [ ]  recommend checking ferritin, iron sat, repleting if deficient    # Severe protein-calorie malnutrition   - agree with nutrition service's assessment ; supplemental nutrition per nutrition recs   - Getting dronabinol 5mg q12 hrs     # Sacral wound - continue off loading, dressings,  [ ] get wound care to reevaluate pressure injury     GI PPX - on pantoprazole 40mg PO BID   DVT PPX - already on full dose AC with lovenox

## 2023-01-07 NOTE — PROGRESS NOTE ADULT - SUBJECTIVE AND OBJECTIVE BOX
INTERVAL HPI/OVERNIGHT EVENTS:    STATUS POST:      POST OPERATIVE DAY #:     SUBJECTIVE:      aMIOdarone    Tablet 200 milliGRAM(s) Oral daily  enoxaparin Injectable 60 milliGRAM(s) SubCutaneous every 12 hours  metoprolol succinate ER 25 milliGRAM(s) Oral daily      Vital Signs Last 24 Hrs  T(C): 37.4 (07 Jan 2023 05:00), Max: 37.4 (07 Jan 2023 05:00)  T(F): 99.4 (07 Jan 2023 05:00), Max: 99.4 (07 Jan 2023 05:00)  HR: 88 (07 Jan 2023 03:58) (78 - 88)  BP: 116/67 (07 Jan 2023 03:58) (101/66 - 116/67)  BP(mean): 85 (07 Jan 2023 03:58) (78 - 85)  RR: 18 (07 Jan 2023 03:58) (17 - 18)  SpO2: 96% (07 Jan 2023 03:58) (96% - 99%)    Parameters below as of 07 Jan 2023 03:58  Patient On (Oxygen Delivery Method): room air      I&O's Detail    06 Jan 2023 07:01  -  07 Jan 2023 07:00  --------------------------------------------------------  IN:    Oral Fluid: 490 mL  Total IN: 490 mL    OUT:    Ileostomy (mL): 2225 mL    Voided (mL): 450 mL  Total OUT: 2675 mL    Total NET: -2185 mL          General: NAD, resting comfortably in bed  C/V: NSR  Pulm: Nonlabored breathing, no respiratory distress  Abd: soft, NT/ND.  Extrem: WWP, no edema, SCDs in place  Drains:  Kalani:      LABS:                        10.4   12.50 )-----------( 401      ( 06 Jan 2023 22:39 )             33.2     01-06    135  |  104  |  12  ----------------------------<  105<H>  4.0   |  22  |  0.59    Ca    8.4      06 Jan 2023 22:39  Phos  2.7     01-06  Mg     2.0     01-06    TPro  6.5  /  Alb  2.6<L>  /  TBili  0.7  /  DBili  x   /  AST  50<H>  /  ALT  59<H>  /  AlkPhos  149<H>  01-06          RADIOLOGY & ADDITIONAL STUDIES:   INTERVAL HPI/OVERNIGHT EVENTS: reporting low mood. +F+BM. Repleted 600mL for output 1.2L.    SUBJECTIVE: Patient seen and examined at bedside with chief. Appetite and PO intake improved, denies abdominal pain, n/v, cp, sob.      aMIOdarone    Tablet 200 milliGRAM(s) Oral daily  enoxaparin Injectable 60 milliGRAM(s) SubCutaneous every 12 hours  metoprolol succinate ER 25 milliGRAM(s) Oral daily      Vital Signs Last 24 Hrs  T(C): 37.4 (07 Jan 2023 05:00), Max: 37.4 (07 Jan 2023 05:00)  T(F): 99.4 (07 Jan 2023 05:00), Max: 99.4 (07 Jan 2023 05:00)  HR: 88 (07 Jan 2023 03:58) (78 - 88)  BP: 116/67 (07 Jan 2023 03:58) (101/66 - 116/67)  BP(mean): 85 (07 Jan 2023 03:58) (78 - 85)  RR: 18 (07 Jan 2023 03:58) (17 - 18)  SpO2: 96% (07 Jan 2023 03:58) (96% - 99%)    Parameters below as of 07 Jan 2023 03:58  Patient On (Oxygen Delivery Method): room air      I&O's Detail    06 Jan 2023 07:01  -  07 Jan 2023 07:00  --------------------------------------------------------  IN:    Oral Fluid: 490 mL  Total IN: 490 mL    OUT:    Ileostomy (mL): 2225 mL    Voided (mL): 450 mL  Total OUT: 2675 mL    Total NET: -2185 mL          Physical Exam:  General Appearance: Appears well, NAD  Pulmonary: Nonlabored breathing, no respiratory distress  Abdomen: Soft, nondistended, nontender. No rebound or guarding. Midline incision healing well without erythema or drainage. Ostomy noted in RLQ to be pink and patent with stool and gas in appliance  Extremities: Cachectic appearing. Dry gangrene noted in LLE from knee and distal      LABS:                        10.4   12.50 )-----------( 401      ( 06 Jan 2023 22:39 )             33.2     01-06    135  |  104  |  12  ----------------------------<  105<H>  4.0   |  22  |  0.59    Ca    8.4      06 Jan 2023 22:39  Phos  2.7     01-06  Mg     2.0     01-06    TPro  6.5  /  Alb  2.6<L>  /  TBili  0.7  /  DBili  x   /  AST  50<H>  /  ALT  59<H>  /  AlkPhos  149<H>  01-06          RADIOLOGY & ADDITIONAL STUDIES:

## 2023-01-08 NOTE — PROGRESS NOTE ADULT - ASSESSMENT
77y y/o Male with significant PMHx of IVDU found unresponsive at his nursing home, resolved after Narcan in the field, and brought to [facility]. Found to have PE, atrial flutter, and large LV thrombus on echo. Transferred to [facility] for further management. Pt C/o abdominal pain on 12/10 CTA showing mid SMA with embolus. Abnormal distal small bowel loops and cecum with dilatation and pneumatosis suggesting infarcted bowel. One or two tiny foci of  intrahepatic portal vein pneumatosis. Segmental infarction upper pole left kidney. Now s/p Ex lap, SMA embolectomy, 80cm SBR, abthera vac left in discontinuity (12/11) and transferred to SICU intubated. S/p second look (12/13) and most recently s/p OR for 3rd look, end-to-end anastomosis of remaining bowel, loop ileostomy and abdomen closure (12/15). Now stepped down with limb ischemia to LLE pending amputation. Ischemia has not yet fully demarcated, it is clear that the posterior calf (which is needed to heal a BKA flap) is involved and therefore only surgical option available to the patient is an AKA pending medical optimization.     Recommendations:  - Plan for AKA once patient's clinical and nutritional status has improved  - Appreciate cardiology/ep recs   - Appreciate Hospitalist recs for preop clearance  - Continue local wound care with betadine to all skin below the line of demarcation of his left shin and kerlix. No offloading boot.   - Continue supportive measures  - Rest of care per primary team   - Vascular surgery Team 3C will continue to follow. Please call [phone] with any questions or concerns.

## 2023-01-08 NOTE — PROGRESS NOTE ADULT - ASSESSMENT
77 year old man who first presented to [facility] from [facility] due to unresponsiveness (responded to Narcan). At [facility], found to have subsegmental pulmonary embolism in RUL, rapid atrial flutter with severely reduced LVEF with LV thrombus. Transferred to [facility] on 12/7/22 for LORENZO and possible ablation. At [facility], was found to have left leg ischemia (left leg arterial thrombus on LE duplex on 12/8). Was being considered for rhythm control when he developed abdominal pain, CTA (12/10/22) showed embolus in SMA, bowel infarct, requiring ex-lap on 12/11 with SMA embolectomy, 80cm small bowel resection; On 12/13, underwent 2nd look laparotomy, with 80cm small bowel resection, closure with abthera. On 12/15, underwent 3rd look laparotomy, end-to-end anastomosis of remaining bowel, loop ileostomy and abdominal closure. Now extubated, on tele floor. Eventually underwent LORENZO/DCCV on 12/30/22, now in sinus rhythm. Currently being evaluated for possible above knee amputation for LLE ischemia.     # pre-operative assessment   Defer evaluation of cardiac risk to cardiology consult; agree with recommendation for cardiac anesthesia.   Per EP, patient should be on uninterrupted anticoagulation for 30 days after DCCV.   Per cardiology, would not recommend interrupting anticoagulation if pursuing surgery before 30 day duration is complete.  Timing of Above knee amputation per vascular surgery, and surgical team's assessment of bleeding risk, given EP/Cards recommendations for uninterrupted anti-coagulation   Patient is Intermediate to high risk for an intermediate risk procedure. Given the risk of progression of disease with non-intervention, reasonable to proceed with surgery so long as it remains consistent with patient's goals of care.   Internal medicine service will continue to follow. [facility] if patient's clinical status changes.     # Left leg ischemia - Decision re: timing of Above Knee Amputation per vascular surgery and primary team. Awaiting for being nutritionally optimized (What is the goal for proceeding surgery?)    # Elevated liver tests --- alk phos, ast, alt mildly elevated. ddx includes mild congestive hepatopathy, amiodarone tox, lovenox related hepatotoxicity, hepatic venous outflow obstruction    continue to trend daily liver tests; Currently stable. Consider hepatology consult, reassessing volume status, reimaging liver vasculature and trial of different anticoagulant if liver tests continue to uptrend    # Acute Systolic Heart Failure - Followed by heart failure team; Euvolemic today. Continue metoprolol succinate 25mg qd   # Atrial Flutter - EP following, now s/p DCCV 12/30. EP recommending uninterrupted AC x 30 days (on enoxaparin 60mg SQ q12). On amiodarone 200mg qd, metoprolol succinate 25 qd . Currently sinus.  # Pulmonary embolism (subsegmental RUL) - After completion of AC for DCCV, would need to continue AC for PE. On lovenox full dose.  # Bowel ischemia - s/p SMA embolectomy; Small bowel resection 80 cm + 40 cm; ileostomy in place; mgmt per primary team   - Monitor ileostomy output on loperamide 4mg TID, tincture of opium QID     # Hypophosphatemia - Phosphorus Level, Serum: 2.4 ; Likely 2/2 poor PO intake. Replete  # Acute blood loss anemia - Hgb downtrended from time of admission (Hgb 14 --> ~10); partially attributable to operative blood losses (underwent laparotomy x 3);  recommend checking ferritin, iron sat, (ordered) repleting if deficient    # Severe protein-calorie malnutrition   - agree with nutrition service's assessment ; supplemental nutrition per nutrition recs   - Getting dronabinol 5mg q12 hrs   - Need someone who can provide a better care to accommodate his feeding request.    # Sacral wound - continue off loading, dressings,  [ ] get wound care to reevaluate pressure injury - Plan to call Monday?    GI PPX - on pantoprazole 40mg PO BID   DVT PPX - already on full dose AC with lovenox

## 2023-01-08 NOTE — PROGRESS NOTE ADULT - SUBJECTIVE AND OBJECTIVE BOX
Patient is a 77y old  Male who presents with a chief complaint of A flutter (08 Jan 2023 08:51)      INTERVAL HPI/OVERNIGHT EVENTS:  pt states his mood is "alright". Pt wants to be feeded as usual people do. He wants warm coffee with sugar first, then [place] toast, then eggs etc. He doesn't like the way they feed him and food and coffee is cold.   Also mentioned that he has been always thin, maximum 184lb in his life, he cannot put much pounds even he eats well. He wants to know the schedule for LLE amputation.       REVIEW OF SYSTEMS: as above. otherwise negative    FAMILY HISTORY:      PHYSICAL EXAM:  GENERAL: NAD, well-groomed, thin  HEAD:  Atraumatic, Normocephalic  EYES: EOMI, conjunctiva and sclera clear  ENMT: No tonsillar erythema, exudates, or enlargement; Moist mucous membranes  NECK: Supple, No JVD, Normal thyroid  NERVOUS SYSTEM:  Alert & Oriented X3, Good concentration; Moving all extremities except LLE  CHEST/LUNG: No rales, rhonchi, wheezing, or rubs +perm cath on R chest  HEART: Regular rate and rhythm; No murmurs, rubs, or gallops  ABDOMEN: Soft, Nontender, Nondistended; Bowel sounds present +ileostomy  EXTREMITIES:  2+ Peripheral Pulses, No clubbing, cyanosis, or edema +dry gangrene on LLE  LYMPH: No lymphadenopathy noted  SKIN: + dry gangrene on LLE:      Vital Signs Last 24 Hrs  T(C): 37.1 (08 Jan 2023 09:00), Max: 37.1 (08 Jan 2023 09:00)  T(F): 98.8 (08 Jan 2023 09:00), Max: 98.8 (08 Jan 2023 09:00)  HR: 84 (08 Jan 2023 08:15) (84 - 96)  BP: 96/69 (08 Jan 2023 08:15) (96/69 - 107/71)  BP(mean): 79 (08 Jan 2023 08:15) (79 - 86)  RR: 17 (08 Jan 2023 08:15) (16 - 18)  SpO2: 99% (08 Jan 2023 08:15) (95% - 100%)    Parameters below as of 08 Jan 2023 08:15  Patient On (Oxygen Delivery Method): room air        01-07-23 @ 07:01  -  01-08-23 @ 07:00  --------------------------------------------------------  IN: 1660 mL / OUT: 2800 mL / NET: -1140 mL    01-08-23 @ 07:01  - 01-08-23 @ 11:49  --------------------------------------------------------  IN: 0 mL / OUT: 250 mL / NET: -250 mL      Consultant(s) Notes Reviewed:  [x ] YES  [ ] NO  Care Discussed with Consultants/Other Providers [ x] YES  [ ] NO    LABS:        RADIOLOGY & ADDITIONAL TESTS:    Imaging Personally Reviewed:  [ ] YES  [ ] NO  acetaminophen     Tablet .. 650 milliGRAM(s) Oral every 6 hours PRN  aMIOdarone    Tablet 200 milliGRAM(s) Oral daily  ascorbic acid 500 milliGRAM(s) Oral daily  chlorhexidine 2% Cloths 1 Application(s) Topical <User Schedule>  dextrose 5%. 1000 milliLiter(s) IV Continuous <Continuous>  dextrose 5%. 1000 milliLiter(s) IV Continuous <Continuous>  dextrose Oral Gel 15 Gram(s) Oral once PRN  diphenoxylate/atropine 2 Tablet(s) Oral three times a day  dronabinol 5 milliGRAM(s) Oral every 12 hours  enoxaparin Injectable 60 milliGRAM(s) SubCutaneous every 12 hours  glucagon  Injectable 1 milliGRAM(s) IntraMuscular once  influenza  Vaccine (HIGH DOSE) 0.7 milliLiter(s) IntraMuscular once  insulin lispro (ADMELOG) corrective regimen sliding scale   SubCutaneous every 6 hours  lactated ringers. 500 milliLiter(s) IV Continuous <Continuous>  loperamide 4 milliGRAM(s) Oral three times a day  metoprolol succinate ER 25 milliGRAM(s) Oral daily  multivitamin 1 Tablet(s) Oral daily  opium Tincture 6 milliGRAM(s) Oral four times a day  oxyCODONE    IR 5 milliGRAM(s) Oral every 4 hours PRN  pantoprazole    Tablet 40 milliGRAM(s) Oral two times a day  psyllium Powder 1 Packet(s) Oral every 12 hours  silver sulfADIAZINE 1% Cream 1 Application(s) Topical daily  zinc sulfate 220 milliGRAM(s) Oral daily      HEALTH ISSUES - PROBLEM Dx:  Abdominal pain    Encounter for palliative care    Therapeutic opioid induced constipation    Difficulty coping with disease    Heart failure    Advance care planning    Debility    Acute mesenteric ischemia    Septic shock    Limb ischemia    Poor appetite    Hemorrhagic shock    Acute systolic congestive heart failure    Atrial flutter    Mesenteric ischemia    GI bleed    Lower limb ischemia    Severe protein-calorie malnutrition

## 2023-01-08 NOTE — PROGRESS NOTE ADULT - ASSESSMENT
77M with PMHx of IVDU found unresponsive at his nursing home, resolved after Narcan in the field and brought to [facility]. Found to have PE, atrial flutter, and large LV thrombus on echo. Transferred to [facility] for further management. Pt c/o abdominal pain on 12/10 CTA showing mid SMA with embolus. Abnormal distal small bowel loops and cecum with dilatation and pneumatosis suggesting infarcted bowel. One or two tiny foci of intrahepatic portal vein pneumatosis. Segmental infarction upper pole left kidney. Now s/p Ex lap, SMA embolectomy, 80cm SBR, abthera vac left in discontinuity (12/11) and transferred to SICU intubated. S/p second look (12/13) and most recently s/p OR for 3rd look, end-to-end anastomosis of remaining bowel, loop ileostomy and abdomen closure (12/15). Remains in SICU, now extubated with still with limb ischemia to LLE pending amputation. Now stepped down to tele.     Reg/High fiber diet + ensures  Calorie count  Replete ostomy output 1:2 q12h  Psych consult Monday AM  Pain/nausea control  Imodium, metamucin, tinctuire of opium for high ileostomy output  A flutter now s/p cardioversion, amio load, now on amio 200 QD, toprolol XL 25 QD  LV thrombus w LLE limb ischemia- Heparin gtt  changed to SQL BID  Planning for AKA with vascular surgery  AM labs

## 2023-01-08 NOTE — CHART NOTE - NSCHARTNOTEFT_GEN_A_CORE
Calorie Count Assessment    Day 1: 1241 kcals, 84 g protein, meets 76% of EER, 84% of protein needs  Day 2: 1009 kcals, 53 g protein, meets 62% of EER, 62% of protein needs  Day 3: 1378 kcals, 69 g protein, meets 84% of EER, 81% of protein needs    estimations based on lower end of estimated range***  Current body wt for EER based on clinician judgment. Adjust for age, malnutrition, EF, S/p OR, Pressure ulcer; fluids per team  25-30kcal/k-1950kcal/day   1.3-1.5gm/k-98gm prot/day     Discussed with team in regards to calorie count findings, pt meeting needs, on average 74% of kcal, and 80% of protein needs. Drinking Ensures at least 2xdays a  day, usually 100% completion. Meals ranges from % consumption. Noted pt is picky with meals, and as per prev RD, encourage continue adequate PO, and if able, family can provide meals for pt per his personal preferences. As noted per prev RD note, strongly consider alternate nutrition if prolonged adequate PO. Will continue to monitor.

## 2023-01-08 NOTE — PROGRESS NOTE ADULT - SUBJECTIVE AND OBJECTIVE BOX
Vascular Surgery Progress Note    Subjective:   Patient seen and examined at bedside. No acute events noted overnight. Patient admits to mild pain in LLE at this time. States he is tolerating diet without nausea or emesis. Denies abdominal pain.     ROS:   Denies Headache, blurred vision, Chest Pain, SOB, Abdominal pain, nausea or vomiting     Social   aMIOdarone    Tablet 200  enoxaparin Injectable 60  metoprolol succinate ER 25      Allergies    No Known Allergies    Intolerances        Vital Signs Last 24 Hrs  T(C): 36.9 (08 Jan 2023 04:58), Max: 37.1 (07 Jan 2023 09:00)  T(F): 98.5 (08 Jan 2023 04:58), Max: 98.7 (07 Jan 2023 09:00)  HR: 96 (08 Jan 2023 03:20) (84 - 96)  BP: 100/67 (08 Jan 2023 03:20) (100/55 - 107/71)  BP(mean): 79 (08 Jan 2023 03:20) (70 - 86)  RR: 18 (08 Jan 2023 03:20) (16 - 18)  SpO2: 100% (08 Jan 2023 03:20) (95% - 100%)    Parameters below as of 08 Jan 2023 03:20  Patient On (Oxygen Delivery Method): room air      I&O's Summary    07 Jan 2023 07:01  -  08 Jan 2023 07:00  --------------------------------------------------------  IN: 1660 mL / OUT: 2800 mL / NET: -1140 mL        Physical Exam:  General: Resting in bed, NAD, emaciated appearing  Pulmonary: Equal chest wall expansion b/l, no respiratory distress  Cardiovascular: NSR  Abdominal: soft, nondistended, minimal tenderness. Midline incision healing well  Extremities: LLE dry gangrene to level of knee with skin sloughing and no identifiable bullae or evidence of wet gangrene. No doppler signals present. Dressed with betadine and kerlix        LABS:                        10.0   10.69 )-----------( 403      ( 07 Jan 2023 13:02 )             31.9     01-07    136  |  107  |  14  ----------------------------<  130<H>  4.0   |  23  |  0.64    Ca    8.4      07 Jan 2023 13:02  Phos  2.4     01-07  Mg     1.9     01-07    TPro  6.7  /  Alb  2.7<L>  /  TBili  0.7  /  DBili  x   /  AST  42<H>  /  ALT  58<H>  /  AlkPhos  150<H>  01-07

## 2023-01-08 NOTE — PROGRESS NOTE ADULT - SUBJECTIVE AND OBJECTIVE BOX
SUBJECTIVE:  Patient seen and examined on AM rounds with chief resident. No acute events overnight. Calorie count showing improvement in nutritional status (eating 75% of tray, 80% protein). Denies nausea, vomiting, fever, chills, and abdominal pain.     MEDICATIONS  (STANDING):  aMIOdarone    Tablet 200 milliGRAM(s) Oral daily  ascorbic acid 500 milliGRAM(s) Oral daily  chlorhexidine 2% Cloths 1 Application(s) Topical <User Schedule>  dextrose 5%. 1000 milliLiter(s) (50 mL/Hr) IV Continuous <Continuous>  dextrose 5%. 1000 milliLiter(s) (100 mL/Hr) IV Continuous <Continuous>  diphenoxylate/atropine 2 Tablet(s) Oral three times a day  dronabinol 5 milliGRAM(s) Oral every 12 hours  enoxaparin Injectable 60 milliGRAM(s) SubCutaneous every 12 hours  glucagon  Injectable 1 milliGRAM(s) IntraMuscular once  influenza  Vaccine (HIGH DOSE) 0.7 milliLiter(s) IntraMuscular once  insulin lispro (ADMELOG) corrective regimen sliding scale   SubCutaneous every 6 hours  lactated ringers. 500 milliLiter(s) (325 mL/Hr) IV Continuous <Continuous>  loperamide 4 milliGRAM(s) Oral three times a day  metoprolol succinate ER 25 milliGRAM(s) Oral daily  multivitamin 1 Tablet(s) Oral daily  opium Tincture 6 milliGRAM(s) Oral four times a day  pantoprazole    Tablet 40 milliGRAM(s) Oral two times a day  psyllium Powder 1 Packet(s) Oral every 12 hours  silver sulfADIAZINE 1% Cream 1 Application(s) Topical daily  zinc sulfate 220 milliGRAM(s) Oral daily    MEDICATIONS  (PRN):  acetaminophen     Tablet .. 650 milliGRAM(s) Oral every 6 hours PRN Temp greater or equal to 38C (100.4F), Mild Pain (1 - 3)  dextrose Oral Gel 15 Gram(s) Oral once PRN Blood Glucose LESS THAN 70 milliGRAM(s)/deciliter  oxyCODONE    IR 5 milliGRAM(s) Oral every 4 hours PRN Severe Pain (7 - 10)      Vital Signs Last 24 Hrs  T(C): 36.8 (08 Jan 2023 18:03), Max: 37.1 (08 Jan 2023 09:00)  T(F): 98.2 (08 Jan 2023 18:03), Max: 98.8 (08 Jan 2023 09:00)  HR: 84 (08 Jan 2023 16:35) (78 - 96)  BP: 95/61 (08 Jan 2023 16:35) (95/61 - 107/71)  BP(mean): 74 (08 Jan 2023 16:35) (74 - 86)  RR: 17 (08 Jan 2023 16:35) (17 - 18)  SpO2: 99% (08 Jan 2023 16:35) (95% - 100%)    Parameters below as of 08 Jan 2023 16:35  Patient On (Oxygen Delivery Method): room air    Physical Exam:  General: NAD, resting comfortably in bed  Pulmonary: Nonlabored breathing, no respiratory distress  Cardiovascular: NSR  Abdominal: Soft, nondistended, nontender. No rebound or guarding. Midline incision healing well without erythema or drainage. Ostomy noted in RLQ to be pink and patent with stool and gas in appliance  Extremities: WWP, normal strength    I&O's Summary    07 Jan 2023 07:01  -  08 Jan 2023 07:00  --------------------------------------------------------  IN: 1660 mL / OUT: 2800 mL / NET: -1140 mL    08 Jan 2023 07:01  -  08 Jan 2023 17:17  --------------------------------------------------------  IN: 0 mL / OUT: 1525 mL / NET: -1525 mL        LABS:                        10.0   10.69 )-----------( 403      ( 07 Jan 2023 13:02 )             31.9     01-07    136  |  107  |  14  ----------------------------<  130<H>  4.0   |  23  |  0.64    Ca    8.4      07 Jan 2023 13:02  Phos  2.4     01-07  Mg     1.9     01-07    TPro  6.7  /  Alb  2.7<L>  /  TBili  0.7  /  DBili  x   /  AST  42<H>  /  ALT  58<H>  /  AlkPhos  150<H>  01-07        CAPILLARY BLOOD GLUCOSE        LIVER FUNCTIONS - ( 07 Jan 2023 13:02 )  Alb: 2.7 g/dL / Pro: 6.7 g/dL / ALK PHOS: 150 U/L / ALT: 58 U/L / AST: 42 U/L / GGT: x             RADIOLOGY & ADDITIONAL STUDIES:

## 2023-01-09 NOTE — PROGRESS NOTE ADULT - SUBJECTIVE AND OBJECTIVE BOX
Subjective: Evening bolused 500 for daytime output. 1x episode of abd pain, resolved. AM bolused 550 for 1100 output. Pt complaining of back pain this AM, also complaining of pain in left calf and left knee during dressing change.     ROS:   Denies Headache, blurred vision, Chest Pain, SOB, Abdominal pain, nausea or vomiting     Social   aMIOdarone    Tablet 200  enoxaparin Injectable 60  metoprolol succinate ER 25      Allergies    No Known Allergies    Intolerances        Vital Signs Last 24 Hrs  T(C): 36.8 (09 Jan 2023 04:35), Max: 37.2 (08 Jan 2023 22:04)  T(F): 98.2 (09 Jan 2023 04:35), Max: 98.9 (08 Jan 2023 22:04)  HR: 92 (09 Jan 2023 03:36) (78 - 102)  BP: 125/82 (09 Jan 2023 03:36) (95/61 - 125/82)  BP(mean): 99 (09 Jan 2023 03:36) (74 - 99)  RR: 17 (09 Jan 2023 03:36) (17 - 17)  SpO2: 99% (09 Jan 2023 03:36) (99% - 99%)    Parameters below as of 09 Jan 2023 03:36  Patient On (Oxygen Delivery Method): room air      I&O's Summary    08 Jan 2023 07:01  -  09 Jan 2023 07:00  --------------------------------------------------------  IN: 0 mL / OUT: 2825 mL / NET: -2825 mL        Physical Exam:  General: Resting in bed, NAD, emaciated appearing  Pulmonary: Equal chest wall expansion b/l, no respiratory distress  Cardiovascular: NSR  Abdominal: soft, nondistended, minimal tenderness. Midline incision healing well  Extremities: LLE dry gangrene to level of knee with skin sloughing and no identifiable bullae or evidence of wet gangrene. No doppler signals present. Dressed with betadine and kerlix      LABS:                        10.0   10.69 )-----------( 403      ( 07 Jan 2023 13:02 )             31.9     01-07    136  |  107  |  14  ----------------------------<  130<H>  4.0   |  23  |  0.64    Ca    8.4      07 Jan 2023 13:02  Phos  2.4     01-07  Mg     1.9     01-07    TPro  6.7  /  Alb  2.7<L>  /  TBili  0.7  /  DBili  x   /  AST  42<H>  /  ALT  58<H>  /  AlkPhos  150<H>  01-07          A/P: 77y y/o Male with significant PMHx of IVDU found unresponsive at his nursing home, resolved after Narcan in the field, and brought to [facility]. Found to have PE, atrial flutter, and large LV thrombus on echo. Transferred to [facility] for further management. Pt C/o abdominal pain on 12/10 CTA showing mid SMA with embolus. Abnormal distal small bowel loops and cecum with dilatation and pneumatosis suggesting infarcted bowel. One or two tiny foci of  intrahepatic portal vein pneumatosis. Segmental infarction upper pole left kidney. Now s/p Ex lap, SMA embolectomy, 80cm SBR, abthera vac left in discontinuity (12/11) and transferred to SICU intubated. S/p second look (12/13) and most recently s/p OR for 3rd look, end-to-end anastomosis of remaining bowel, loop ileostomy and abdomen closure (12/15). Now stepped down with limb ischemia to LLE pending amputation. Ischemia has not yet fully demarcated, it is clear that the posterior calf (which is needed to heal a BKA flap) is involved and therefore only surgical option available to the patient is an AKA pending medical optimization.     Recommendations:  - Plan for AKA once patient's clinical and nutritional status has improved  - Appreciate cardiology/ep recs   - Appreciate Hospitalist recs for preop clearance  - Continue local wound care with betadine to all skin below the line of demarcation of his left shin and kerlix. No offloading boot.   - Continue supportive measures  - Rest of care per primary team   - Vascular surgery Team 3C will continue to follow. Please call [phone] with any questions or concerns.

## 2023-01-09 NOTE — PROGRESS NOTE ADULT - SUBJECTIVE AND OBJECTIVE BOX
INTERVAL HPI/OVERNIGHT EVENTS: evening bolused 500 for daytime output. 1x episode of abd pain, resolved. am bolused 550 for 1100 output.    STATUS POST:  ex lap, loop ileostomy    SUBJECTIVE:  pt seen at bedside, without complaints at this time    MEDICATIONS  (STANDING):  aMIOdarone    Tablet 200 milliGRAM(s) Oral daily  ascorbic acid 500 milliGRAM(s) Oral daily  chlorhexidine 2% Cloths 1 Application(s) Topical <User Schedule>  dextrose 5%. 1000 milliLiter(s) (50 mL/Hr) IV Continuous <Continuous>  dextrose 5%. 1000 milliLiter(s) (100 mL/Hr) IV Continuous <Continuous>  diphenoxylate/atropine 2 Tablet(s) Oral three times a day  dronabinol 5 milliGRAM(s) Oral every 12 hours  enoxaparin Injectable 60 milliGRAM(s) SubCutaneous every 12 hours  glucagon  Injectable 1 milliGRAM(s) IntraMuscular once  influenza  Vaccine (HIGH DOSE) 0.7 milliLiter(s) IntraMuscular once  insulin lispro (ADMELOG) corrective regimen sliding scale   SubCutaneous every 6 hours  lactated ringers Bolus 500 milliLiter(s) IV Bolus once  loperamide 4 milliGRAM(s) Oral three times a day  metoprolol succinate ER 25 milliGRAM(s) Oral daily  multivitamin 1 Tablet(s) Oral daily  opium Tincture 6 milliGRAM(s) Oral four times a day  pantoprazole    Tablet 40 milliGRAM(s) Oral two times a day  psyllium Powder 1 Packet(s) Oral every 12 hours  silver sulfADIAZINE 1% Cream 1 Application(s) Topical daily  zinc sulfate 220 milliGRAM(s) Oral daily    MEDICATIONS  (PRN):  acetaminophen     Tablet .. 650 milliGRAM(s) Oral every 6 hours PRN Temp greater or equal to 38C (100.4F), Mild Pain (1 - 3)  dextrose Oral Gel 15 Gram(s) Oral once PRN Blood Glucose LESS THAN 70 milliGRAM(s)/deciliter  oxyCODONE    IR 5 milliGRAM(s) Oral every 4 hours PRN Severe Pain (7 - 10)      Vital Signs Last 24 Hrs  T(C): 36.8 (09 Jan 2023 04:35), Max: 37.2 (08 Jan 2023 22:04)  T(F): 98.2 (09 Jan 2023 04:35), Max: 98.9 (08 Jan 2023 22:04)  HR: 92 (09 Jan 2023 03:36) (78 - 102)  BP: 125/82 (09 Jan 2023 03:36) (95/61 - 125/82)  BP(mean): 99 (09 Jan 2023 03:36) (74 - 99)  RR: 17 (09 Jan 2023 03:36) (17 - 17)  SpO2: 99% (09 Jan 2023 03:36) (99% - 99%)    Parameters below as of 09 Jan 2023 03:36  Patient On (Oxygen Delivery Method): room air        PHYSICAL EXAM:      Constitutional: A&Ox3    Respiratory: non labored breathing, no respiratory distress    Cardiovascular: NSR, RRR    Gastrointestinal: soft, nontender, nondistended, incision c/d/i, ileostomy pink and patent with fecal output    Extremities: (-) edema                  I&O's Detail    07 Jan 2023 07:01  -  08 Jan 2023 07:00  --------------------------------------------------------  IN:    Lactated Ringers Bolus: 700 mL    Oral Fluid: 960 mL  Total IN: 1660 mL    OUT:    Ileostomy (mL): 2175 mL    Voided (mL): 625 mL  Total OUT: 2800 mL    Total NET: -1140 mL      08 Jan 2023 07:01  -  09 Jan 2023 06:55  --------------------------------------------------------  IN:  Total IN: 0 mL    OUT:    Ileostomy (mL): 2450 mL    Voided (mL): 375 mL  Total OUT: 2825 mL    Total NET: -2825 mL          LABS:                        10.0   10.69 )-----------( 403      ( 07 Jan 2023 13:02 )             31.9     01-07    136  |  107  |  14  ----------------------------<  130<H>  4.0   |  23  |  0.64    Ca    8.4      07 Jan 2023 13:02  Phos  2.4     01-07  Mg     1.9     01-07    TPro  6.7  /  Alb  2.7<L>  /  TBili  0.7  /  DBili  x   /  AST  42<H>  /  ALT  58<H>  /  AlkPhos  150<H>  01-07          RADIOLOGY & ADDITIONAL STUDIES:

## 2023-01-09 NOTE — PROGRESS NOTE ADULT - ASSESSMENT
77 year old man who first presented to [facility] from [facility] due to unresponsiveness (responded to Narcan). At [facility], found to have subsegmental pulmonary embolism in RUL, rapid atrial flutter with severely reduced LVEF with LV thrombus. Transferred to [facility] on 12/7/22 for LORENZO and possible ablation. At [facility], was found to have left leg ischemia (left leg arterial thrombus on LE duplex on 12/8). Was being considered for rhythm control when he developed abdominal pain, CTA (12/10/22) showed embolus in SMA, bowel infarct, requiring ex-lap on 12/11 with SMA embolectomy, 80cm small bowel resection; On 12/13, underwent 2nd look laparotomy, with 80cm small bowel resection, closure with abthera. On 12/15, underwent 3rd look laparotomy, end-to-end anastomosis of remaining bowel, loop ileostomy and abdominal closure. Now extubated, on tele floor. Eventually underwent LORENZO/DCCV on 12/30/22, now in sinus rhythm. Currently being evaluated for possible above knee amputation for LLE ischemia.     # pre-operative assessment   Defer evaluation of cardiac risk to cardiology consult; agree with recommendation for cardiac anesthesia.   Per EP, patient should be on uninterrupted anticoagulation for 30 days after DCCV.   Per cardiology, would not recommend interrupting anticoagulation if pursuing surgery before 30 day duration is complete.  Timing of Above knee amputation per vascular surgery, and surgical team's assessment of bleeding risk, given EP/Cards recommendations for uninterrupted anti-coagulation   Patient is Intermediate to high risk for an intermediate risk procedure. Given the risk of progression of disease with non-intervention, reasonable to proceed with surgery so long as it remains consistent with patient's goals of care.   Internal medicine service will continue to follow. [facility]'s clinical status changes.     # Left leg ischemia - Decision re: timing of Above Knee Amputation per vascular surgery and primary team. Optimizing nutritional status prior to surgery     # Elevated liver tests --- alk phos, ast, alt mildly elevated. ddx includes mild congestive hepatopathy, amiodarone tox, lovenox related hepatotoxicity, hepatic venous outflow obstruction    continue to trend daily liver tests; Currently stable. If liver tests continue to uptrend, reassess volume status, consider hepatology consult, reimaging of liver vasculature and trial of different anticoagulant    # Acute Systolic Heart Failure - Followed by heart failure team; Euvolemic today. Continue metoprolol succinate 25mg qd   # Atrial Flutter - EP following, now s/p DCCV 12/30. EP recommending uninterrupted AC x 30 days (on enoxaparin 60mg SQ q12). On amiodarone 200mg qd, metoprolol succinate 25 qd . Currently in sinus rhythm   # Pulmonary embolism (subsegmental RUL) - After completion of AC for DCCV, would need to continue AC for PE. On lovenox full dose.  # Bowel ischemia - s/p SMA embolectomy; Small bowel resection 80 cm + 40 cm; ileostomy in place; mgmt per primary team   - Monitor ileostomy output on loperamide 4mg TID, tincture of opium QID     # Hypophosphatemia - repleted  # Acute blood loss anemia - Hgb downtrended from time of admission (Hgb 14 --> ~10); partially attributable to operative blood losses (underwent laparotomy x 3);  recommend checking ferritin, iron sat, (ordered) repleting if deficient    # Severe protein-calorie malnutrition   - agree with nutrition service's assessment ; supplemental nutrition per nutrition recs   - Getting dronabinol 5mg q12 hrs     # Sacral wound - continue off loading, dressings  # sleep disturbance -[ ]  trial of melatonin 1mg qHS tonight.     GI PPX - on pantoprazole 40mg PO BID   DVT PPX - already on full dose AC with lovenox       recs: trial of melatonin, f/u liver tests, f/u iron studies,          77 year old man who first presented to [facility] from [facility] due to unresponsiveness (responded to Narcan). At [facility], found to have subsegmental pulmonary embolism in RUL, rapid atrial flutter with severely reduced LVEF with LV thrombus. Transferred to [facility] on 12/7/22 for LORENZO and possible ablation. At [facility], was found to have left leg ischemia (left leg arterial thrombus on LE duplex on 12/8). Was being considered for rhythm control when he developed abdominal pain, CTA (12/10/22) showed embolus in SMA, bowel infarct, requiring ex-lap on 12/11 with SMA embolectomy, 80cm small bowel resection; On 12/13, underwent 2nd look laparotomy, with 80cm small bowel resection, closure with abthera. On 12/15, underwent 3rd look laparotomy, end-to-end anastomosis of remaining bowel, loop ileostomy and abdominal closure. Now extubated, on tele floor. Eventually underwent LORENZO/DCCV on 12/30/22, now in sinus rhythm. Currently being evaluated for possible above knee amputation for LLE ischemia.     # pre-operative assessment   Defer evaluation of cardiac risk to cardiology consult; agree with recommendation for cardiac anesthesia.   Per EP, patient should be on uninterrupted anticoagulation for 30 days after DCCV.   Per cardiology, would not recommend interrupting anticoagulation if pursuing surgery before 30 day duration is complete.  Timing of Above knee amputation per vascular surgery, and surgical team's assessment of bleeding risk, given EP/Cards recommendations for uninterrupted anti-coagulation   Patient is Intermediate to high risk for an intermediate risk procedure. Given the risk of progression of disease with non-intervention, reasonable to proceed with surgery so long as it remains consistent with patient's goals of care.   Internal medicine service will continue to follow. [facility] if [facility]'s clinical status changes.     # Left leg ischemia - Decision re: timing of Above Knee Amputation per vascular surgery and primary team. Optimizing nutritional status prior to surgery     # Elevated liver tests --- alk phos, ast, alt mildly elevated. ddx includes mild congestive hepatopathy, amiodarone tox, lovenox related hepatotoxicity, hepatic venous outflow obstruction    continue to trend daily liver tests; Currently stable. If liver tests continue to uptrend, reassess volume status, consider hepatology consult, reimaging of liver vasculature and trial of different anticoagulant    # Acute Systolic Heart Failure - Followed by heart failure team; Euvolemic today. Continue metoprolol succinate 25mg qd   # Atrial Flutter - EP following, now s/p DCCV 12/30. EP recommending uninterrupted AC x 30 days (on enoxaparin 60mg SQ q12). On amiodarone 200mg qd, metoprolol succinate 25 qd . Currently in sinus rhythm   # Pulmonary embolism (subsegmental RUL) - After completion of AC for DCCV, would need to continue AC for PE. On lovenox full dose.  # Bowel ischemia - s/p SMA embolectomy; Small bowel resection 80 cm + 40 cm; ileostomy in place; mgmt per primary team   - Monitor ileostomy output on loperamide 4mg TID, tincture of opium QID     # Hypophosphatemia - repleted  # Acute blood loss anemia - Hgb downtrended from time of admission (Hgb 14 --> ~10); partially attributable to operative blood losses (underwent laparotomy x 3);  recommend checking ferritin, iron sat, (ordered) repleting if deficient    # Severe protein-calorie malnutrition   - agree with nutrition service's assessment ; supplemental nutrition per nutrition recs   - Getting dronabinol 5mg q12 hrs     # Sacral wound - continue off loading, dressings  # sleep disturbance -[ ]  trial of melatonin 1mg qHS tonight.     GI PPX - on pantoprazole 40mg PO BID   DVT PPX - already on full dose AC with lovenox     recs: trial of melatonin, f/u liver tests, f/u iron studies,

## 2023-01-09 NOTE — PROGRESS NOTE ADULT - ASSESSMENT
77M with PMHx of IVDU found unresponsive at his nursing home, resolved after Narcan in the field and brought to [facility]. Found to have PE, atrial flutter, and large LV thrombus on echo. Transferred to [facility] for further management. Pt c/o abdominal pain on 12/10 CTA showing mid SMA with embolus. Abnormal distal small bowel loops and cecum with dilatation and pneumatosis suggesting infarcted bowel. One or two tiny foci of intrahepatic portal vein pneumatosis. Segmental infarction upper pole left kidney. Now s/p Ex lap, SMA embolectomy, 80cm SBR, abthera vac left in discontinuity (12/11) and transferred to SICU intubated. S/p second look (12/13) and most recently s/p OR for 3rd look, end-to-end anastomosis of remaining bowel, loop ileostomy and abdomen closure (12/15). Remains in SICU, now extubated with still with limb ischemia to LLE pending amputation. Now stepped down to tele.     Reg/High fiber diet + ensures, DHT placed for TF, Jevity 1.2@goal   Pain/nausea control  Imodium, metamucin, tinctuire of opium for high ileostomy output  A flutter now s/p cardioversion, amio load, now on amio 200 QD, toprolol XL 25 QD  LV thrombus w LLE limb ischemia- Heparin gtt  changed to SQL BID  Planning for AKA with vascular surgery  AM labs

## 2023-01-09 NOTE — PROGRESS NOTE ADULT - SUBJECTIVE AND OBJECTIVE BOX
Patient is a 77y old  Male who presents with a chief complaint of A flutter (09 Jan 2023 07:28)    INTERVAL EVENTS:  - tolerating regular texture food   no nausea.   continues to have brisk output from ileostomy     SUBJECTIVE:  Patient was seen and examined at bedside.  Review of systems: No fever, chills, CP, dyspnea, nausea or vomiting, dysuria, LE edema.     MEDICATIONS:  MEDICATIONS  (STANDING):  aMIOdarone    Tablet 200 milliGRAM(s) Oral daily  ascorbic acid 500 milliGRAM(s) Oral daily  chlorhexidine 2% Cloths 1 Application(s) Topical <User Schedule>  dextrose 5%. 1000 milliLiter(s) (50 mL/Hr) IV Continuous <Continuous>  dextrose 5%. 1000 milliLiter(s) (100 mL/Hr) IV Continuous <Continuous>  diphenoxylate/atropine 2 Tablet(s) Oral three times a day  dronabinol 5 milliGRAM(s) Oral every 12 hours  enoxaparin Injectable 60 milliGRAM(s) SubCutaneous every 12 hours  glucagon  Injectable 1 milliGRAM(s) IntraMuscular once  influenza  Vaccine (HIGH DOSE) 0.7 milliLiter(s) IntraMuscular once  insulin lispro (ADMELOG) corrective regimen sliding scale   SubCutaneous every 6 hours  loperamide 4 milliGRAM(s) Oral three times a day  metoprolol succinate ER 25 milliGRAM(s) Oral daily  multivitamin 1 Tablet(s) Oral daily  opium Tincture 6 milliGRAM(s) Oral four times a day  pantoprazole    Tablet 40 milliGRAM(s) Oral two times a day  psyllium Powder 1 Packet(s) Oral every 12 hours  silver sulfADIAZINE 1% Cream 1 Application(s) Topical daily  zinc sulfate 220 milliGRAM(s) Oral daily    MEDICATIONS  (PRN):  acetaminophen     Tablet .. 650 milliGRAM(s) Oral every 6 hours PRN Temp greater or equal to 38C (100.4F), Mild Pain (1 - 3)  dextrose Oral Gel 15 Gram(s) Oral once PRN Blood Glucose LESS THAN 70 milliGRAM(s)/deciliter  melatonin 1 milliGRAM(s) Oral at bedtime PRN Sleep  oxyCODONE    IR 5 milliGRAM(s) Oral every 4 hours PRN Severe Pain (7 - 10)    Allergies    No Known Allergies    Intolerances    OBJECTIVE:  Vital Signs Last 24 Hrs  T(C): 37.2 (09 Jan 2023 18:00), Max: 37.2 (08 Jan 2023 22:04)  T(F): 99 (09 Jan 2023 18:00), Max: 99 (09 Jan 2023 18:00)  HR: 86 (09 Jan 2023 16:18) (86 - 102)  BP: 106/77 (09 Jan 2023 16:18) (98/72 - 125/82)  BP(mean): 88 (09 Jan 2023 16:18) (81 - 99)  RR: 17 (09 Jan 2023 16:18) (17 - 18)  SpO2: 99% (09 Jan 2023 16:18) (99% - 99%)    Parameters below as of 09 Jan 2023 16:18  Patient On (Oxygen Delivery Method): room air      I&O's Summary    08 Jan 2023 07:01  -  09 Jan 2023 07:00  --------------------------------------------------------  IN: 500 mL / OUT: 2825 mL / NET: -2325 mL    09 Jan 2023 07:01  -  09 Jan 2023 20:09  --------------------------------------------------------  IN: 990 mL / OUT: 1600 mL / NET: -610 mL        PHYSICAL EXAM:  Gen: Reclining in bed at time of exam, appears stated age  HEENT: NCAT, MMM, temporal wasting present, cachectic   Neck: supple, trachea at midline  CV: RRR, +S1/S2  Pulm: adequate respiratory effort, no increase in work of breathing  Abd: soft, ND; soft brown output from ostomy.   Skin: right foot skin warm ; no hives , no new rash ; unstageable sacral pressure ulcer, stage III lumbar pressure ulcer - per nurse at bedside, both improved vs last week.   Ext: left foot appearance consistent with dry gangrene. right foot not cold   Neuro: Alert and oriented to person, place, situation,  speaking in full sentences   Psych: dysthymic; behavior appropriate. Unagitated.   /Rectal: No bowen, no rectal tube     LABS:                        11.0   11.77 )-----------( 462      ( 09 Jan 2023 12:34 )             34.9     01-09    137  |  107  |  15  ----------------------------<  131<H>  4.4   |  23  |  0.73    Ca    9.0      09 Jan 2023 12:34  Phos  3.0     01-09  Mg     1.9     01-09    TPro  x   /  Alb  3.0<L>  /  TBili  x   /  DBili  x   /  AST  x   /  ALT  x   /  AlkPhos  x   01-09    LIVER FUNCTIONS - ( 09 Jan 2023 12:34 )  Alb: 3.0 g/dL / Pro: x     / ALK PHOS: x     / ALT: x     / AST: x     / GGT: x             CAPILLARY BLOOD GLUCOSE      MICRODATA:      RADIOLOGY/OTHER STUDIES:

## 2023-01-10 NOTE — PROGRESS NOTE ADULT - SUBJECTIVE AND OBJECTIVE BOX
Patient is a 77y old  Male who presents with a chief complaint of A flutter (10 Ladarius 2023 07:43)    INTERVAL EVENTS:  - counseled patient on need to get blood draws given fact that he will be receiving TPN   - discussed changing AM blood draws to 10AM to reduce early morning awakenings   - Ate breakfast --- full bowl of oatmeal, some yogurt  no nausea, no dysuria.   - PICC line placed ;     SUBJECTIVE:  Patient was seen and examined at bedside.  Review of systems: No fever, chills, dizziness, HA, Changes in vision, CP, dyspnea, nausea or vomiting, dysuria,     MEDICATIONS:  MEDICATIONS  (STANDING):  aMIOdarone    Tablet 200 milliGRAM(s) Oral daily  ascorbic acid 500 milliGRAM(s) Oral daily  chlorhexidine 2% Cloths 1 Application(s) Topical <User Schedule>  chlorhexidine 2% Cloths 1 Application(s) Topical <User Schedule>  dextrose 5%. 1000 milliLiter(s) (50 mL/Hr) IV Continuous <Continuous>  dextrose 5%. 1000 milliLiter(s) (100 mL/Hr) IV Continuous <Continuous>  diphenoxylate/atropine 2 Tablet(s) Oral three times a day  dronabinol 5 milliGRAM(s) Oral every 12 hours  enoxaparin Injectable 60 milliGRAM(s) SubCutaneous every 12 hours  fat emulsion (Fish Oil and Plant Based) 20% Infusion 0.31 Gm/kG/Day (8.33 mL/Hr) IV Continuous <Continuous>  glucagon  Injectable 1 milliGRAM(s) IntraMuscular once  influenza  Vaccine (HIGH DOSE) 0.7 milliLiter(s) IntraMuscular once  insulin lispro (ADMELOG) corrective regimen sliding scale   SubCutaneous every 6 hours  loperamide 4 milliGRAM(s) Oral three times a day  metoprolol succinate ER 25 milliGRAM(s) Oral daily  multivitamin 1 Tablet(s) Oral daily  opium Tincture 6 milliGRAM(s) Oral four times a day  pantoprazole    Tablet 40 milliGRAM(s) Oral two times a day  Parenteral Nutrition - Adult 1 Each (50 mL/Hr) TPN Continuous <Continuous>  psyllium Powder 1 Packet(s) Oral every 12 hours  silver sulfADIAZINE 1% Cream 1 Application(s) Topical daily  zinc sulfate 220 milliGRAM(s) Oral daily    MEDICATIONS  (PRN):  acetaminophen     Tablet .. 650 milliGRAM(s) Oral every 6 hours PRN Temp greater or equal to 38C (100.4F), Mild Pain (1 - 3)  dextrose Oral Gel 15 Gram(s) Oral once PRN Blood Glucose LESS THAN 70 milliGRAM(s)/deciliter  oxyCODONE    IR 5 milliGRAM(s) Oral every 4 hours PRN Severe Pain (7 - 10)  sodium chloride 0.9% lock flush 10 milliLiter(s) IV Push every 1 hour PRN Pre/post blood products, medications, blood draw, and to maintain line patency    Allergies    No Known Allergies    Intolerances    OBJECTIVE:  Vital Signs Last 24 Hrs  T(C): 37.1 (10 Ladarius 2023 09:02), Max: 37.5 (09 Jan 2023 22:00)  T(F): 98.7 (10 Ladarius 2023 09:02), Max: 99.5 (09 Jan 2023 22:00)  HR: 88 (10 Ladarius 2023 12:33) (86 - 94)  BP: 107/69 (10 Ladarius 2023 12:33) (87/68 - 120/76)  BP(mean): 84 (10 Ladarius 2023 12:33) (74 - 92)  RR: 18 (10 Ladarius 2023 12:33) (17 - 18)  SpO2: 99% (10 Ladarius 2023 12:33) (97% - 99%)    Parameters below as of 10 Ladarius 2023 12:33  Patient On (Oxygen Delivery Method): room air      I&O's Summary    09 Jan 2023 07:01  -  10 Ladarius 2023 07:00  --------------------------------------------------------  IN: 990 mL / OUT: 2650 mL / NET: -1660 mL    10 Ladarius 2023 07:01  -  10 Ladarius 2023 14:04  --------------------------------------------------------  IN: 700 mL / OUT: 800 mL / NET: -100 mL    PHYSICAL EXAM:  Gen: Reclining in bed at time of exam, appears stated age  HEENT: NCAT, MMM, temporal wasting present, cachectic   Neck: supple, trachea at midline  CV: RRR, +S1/S2  Pulm: adequate respiratory effort, no increase in work of breathing  Abd: soft, ND; soft brown output from ostomy.   Skin: right foot skin warm ; no hives , no new rash ; unstageable sacral pressure ulcer, stage III lumbar pressure ulcer - per nurse at bedside, both improved vs last week.   Ext: left foot appearance consistent with dry gangrene. right foot not cold   Neuro: Alert and oriented to person, place, situation,  speaking in full sentences   Psych: dysthymic; behavior appropriate. Unagitated.   /Rectal: No bowen, no rectal tube     LABS:                        10.9   11.32 )-----------( 432      ( 10 Ladarius 2023 13:00 )             35.1     01-10    136  |  103  |  x   ----------------------------<  x   4.6   |  x   |  x     Ca    9.0      09 Jan 2023 12:34  Phos  2.9     01-10  Mg     2.0     01-10    TPro  x   /  Alb  3.0<L>  /  TBili  x   /  DBili  x   /  AST  x   /  ALT  x   /  AlkPhos  x   01-09    LIVER FUNCTIONS - ( 09 Jan 2023 12:34 )  Alb: 3.0 g/dL / Pro: x     / ALK PHOS: x     / ALT: x     / AST: x     / GGT: x             CAPILLARY BLOOD GLUCOSE    MICRODATA:      RADIOLOGY/OTHER STUDIES:

## 2023-01-10 NOTE — PROGRESS NOTE ADULT - SUBJECTIVE AND OBJECTIVE BOX
Subjective: VSS, wbc 11.77, Pt worked with physical therapy yesterday and tolerating diet. Pt seen and examined this AM by vascular. Pts LLE dressing is intact. Pt complaining of slight pain to LLE during dressing change.     ROS:   Denies Headache, blurred vision, Chest Pain, SOB, Abdominal pain, nausea or vomiting     Social   aMIOdarone    Tablet 200  enoxaparin Injectable 60  metoprolol succinate ER 25      Allergies    No Known Allergies    Intolerances        Vital Signs Last 24 Hrs  T(C): 37.2 (10 Ladarius 2023 04:36), Max: 37.5 (09 Jan 2023 22:00)  T(F): 99 (10 Ladarius 2023 04:36), Max: 99.5 (09 Jan 2023 22:00)  HR: 94 (10 Ladarius 2023 03:55) (86 - 98)  BP: 87/68 (10 Ladarius 2023 03:55) (87/68 - 120/76)  BP(mean): 74 (10 Ladarius 2023 03:55) (74 - 92)  RR: 17 (10 Ladarius 2023 03:55) (17 - 18)  SpO2: 98% (10 Ladarius 2023 03:55) (98% - 99%)    Parameters below as of 10 Ladarius 2023 03:55  Patient On (Oxygen Delivery Method): room air      I&O's Summary    09 Jan 2023 07:01  -  10 Ladarius 2023 07:00  --------------------------------------------------------  IN: 990 mL / OUT: 2450 mL / NET: -1460 mL        Physical Exam:  General: Resting in bed, NAD, emaciated appearing  Pulmonary: Equal chest wall expansion b/l, no respiratory distress  Cardiovascular: NSR  Abdominal: soft, nondistended, minimal tenderness. Midline incision healing well  Extremities: LLE dry gangrene to level of knee with skin sloughing and no identifiable bullae or evidence of wet gangrene. No doppler signals present. Dressed with betadine and kerlix      LABS:                        11.0   11.77 )-----------( 462      ( 09 Jan 2023 12:34 )             34.9     01-09    137  |  107  |  15  ----------------------------<  131<H>  4.4   |  23  |  0.73    Ca    9.0      09 Jan 2023 12:34  Phos  3.0     01-09  Mg     1.9     01-09    TPro  x   /  Alb  3.0<L>  /  TBili  x   /  DBili  x   /  AST  x   /  ALT  x   /  AlkPhos  x   01-09          A/P: 77y y/o Male with significant PMHx of IVDU found unresponsive at his nursing home, resolved after Narcan in the field, and brought to [facility]. Found to have PE, atrial flutter, and large LV thrombus on echo. Transferred to [facility] for further management. Pt C/o abdominal pain on 12/10 CTA showing mid SMA with embolus. Abnormal distal small bowel loops and cecum with dilatation and pneumatosis suggesting infarcted bowel. One or two tiny foci of  intrahepatic portal vein pneumatosis. Segmental infarction upper pole left kidney. Now s/p Ex lap, SMA embolectomy, 80cm SBR, abthera vac left in discontinuity (12/11) and transferred to SICU intubated. S/p second look (12/13) and most recently s/p OR for 3rd look, end-to-end anastomosis of remaining bowel, loop ileostomy and abdomen closure (12/15). Now stepped down with limb ischemia to LLE pending amputation. Ischemia has not yet fully demarcated, it is clear that the posterior calf (which is needed to heal a BKA flap) is involved and therefore only surgical option available to the patient is an AKA pending medical optimization.     Recommendations:  - Plan for AKA once patient's clinical and nutritional status has improved  - Appreciate cardiology/ep recs   - Appreciate Hospitalist recs for preop clearance  - Continue local wound care with betadine to all skin below the line of demarcation of his left shin and kerlix. No offloading boot, keep left heel elevated off bed.   - Continue supportive measures  - Rest of care per primary team   - Vascular surgery Team 3C will continue to follow. Please call [phone] with any questions or concerns

## 2023-01-10 NOTE — PROGRESS NOTE ADULT - ASSESSMENT
77M Hx  cocaine/opiate abuse and resident of a nursing facility [place] [facility] [place] w/ narcan, noted at that time to be in rapid atrial flutter w/ severe LV dysfunction and mobile LV thrombus as well as subsegemental PE and transferred to [facility] for further management. Course complicated by limb ischemia with arterial thrombosis of LLE vessels and subsequently developed abdominal pain and found to have acute mesenteric ischemia s/p emergent bowel resection and SMA thrombectomy. LV thrombus no longer apparent on TTE and has likely embolized. After prolonged open abdomen, septic shock and now s/p ileostomy. His course has been further complicated by hemodynamically significant GI bleeding. Of note patient, underwent LORENZO/DCCV on 12/30 and remains in sinus rhythm. Patient has been awaiting AKA but attempt to nutritionally optimize by primary team, for wound healing.    Cardiac studies:   TTE: LV 5.0 cm, LVEF 10-15% with global hypokinesis, 2.5 cm mobile LV thrombus, severe RV dysfunction  LORENZO: no ISIAH thrombus    PLAN  Acute Systolic Heart Failure  - Etiology: possible tachycardia induced and now s/p DCCV.. Pending ischemic evaluation as outpatient if LVEF remains reduced pending clinical status  - GDMT: continue metoprolol succinate 25. will add RAASi (likely Entresto) and MRA after surgery  - Diuretics: Remains euvolemic with significant ostomy output.   - Device: will need to be further evaluated in future.   Patient is optimized from a cardiac standpoint and no further work up indicated for pending AKA.    Atrial flutter  - s/p DCCV 12/30. Remains in sinus  - Continue anticoagulation currently on lovenox and minimize interruptions post cardioversion  - on amiodarone per EP     Acute limb ischemia and mesenteric ischemia from LV thrombus  - management per surgery/vascular, currently awaiting AKA   - continue anticoagulation as above    Discussed with Dr. Yo, HF consult attending. 77M Hx  cocaine/opiate abuse and resident of a nursing facility [place] [facility] after unresponsiveness alleviated w/ narcan, noted at that time to be in rapid atrial flutter w/ severe LV dysfunction and mobile LV thrombus as well as subsegemental PE and transferred to [facility] for further management. Course complicated by limb ischemia with arterial thrombosis of LLE vessels and subsequently developed abdominal pain and found to have acute mesenteric ischemia s/p emergent bowel resection and SMA thrombectomy. LV thrombus no longer apparent on TTE and has likely embolized. After prolonged open abdomen, septic shock and now s/p ileostomy. His course has been further complicated by hemodynamically significant GI bleeding. Of note patient, underwent LORENZO/DCCV on 12/30 and remains in sinus rhythm. Patient has been awaiting AKA but attempt to nutritionally optimize by primary team, for wound healing.    Cardiac studies:   TTE: LV 5.0 cm, LVEF 10-15% with global hypokinesis, 2.5 cm mobile LV thrombus, severe RV dysfunction  LORENZO: no ISIAH thrombus    PLAN  Acute Systolic Heart Failure  - Etiology: possible tachycardia induced and now s/p DCCV.. Pending ischemic evaluation as outpatient if LVEF remains reduced pending clinical status  - GDMT: continue metoprolol succinate 25. will add RAASi (likely Entresto) and MRA after surgery  - Diuretics: Remains euvolemic with significant ostomy output.   - Device: will need to be further evaluated in future.   Patient is optimized from a cardiac standpoint and no further work up indicated for pending AKA.    Atrial flutter  - s/p DCCV 12/30. Remains in sinus  - Continue anticoagulation currently on lovenox and minimize interruptions post cardioversion  - on amiodarone 200mg per EP, remains on Toprol 25     Acute limb ischemia and mesenteric ischemia from LV thrombus  - management per surgery/vascular, currently awaiting AKA   - continue anticoagulation as above    Discussed with Dr. Yo, HF consult attending.

## 2023-01-10 NOTE — CHART NOTE - NSCHARTNOTEFT_GEN_A_CORE
*This note reflects assessment on 1.9.2023*    C/L psychology intern met with pt for individual psychotherapy, 90 minutes. Pt's mood was "angry," affect full and congruent to mood. Session focused on pt's difficulty with understanding and accepting his current health problems, with prolonged hospitalization. He also shared extensively about his son and the despair pt feels in their relationship. Writer assessed for suicide risk. Pt denied SI, was future-oriented, and expressed motivation to improve his health and leave the hospital. Pt expressed passive HI toward his son, but denied plan, intent, or access to a weapon. Pt was AAOx4, no AH/VH/PI observed or reported. Pt was responsive to supportive psychotherapy and is open to continuing to meet for psychotherapy. Feeling much better today. States this morning was first time she did not feel nauseated. Tolerating clears and passing additional flatus.   Continue to advance diet slowly as tolerated. Encourage ambulation.

## 2023-01-10 NOTE — PROGRESS NOTE ADULT - ASSESSMENT
77 year old man who first presented to [facility] from [facility] due to unresponsiveness (responded to Narcan). At [facility], found to have subsegmental pulmonary embolism in RUL, rapid atrial flutter with severely reduced LVEF with LV thrombus. Transferred to [facility] on 12/7/22 for LORENZO and possible ablation. At [facility], was found to have left leg ischemia (left leg arterial thrombus on LE duplex on 12/8). Was being considered for rhythm control when he developed abdominal pain, CTA (12/10/22) showed embolus in SMA, bowel infarct, requiring ex-lap on 12/11 with SMA embolectomy, 80cm small bowel resection; On 12/13, underwent 2nd look laparotomy, with 80cm small bowel resection, closure with abthera. On 12/15, underwent 3rd look laparotomy, end-to-end anastomosis of remaining bowel, loop ileostomy and abdominal closure. Now extubated, on tele floor. Eventually underwent LORENZO/DCCV on 12/30/22, now in sinus rhythm. Currently being evaluated for possible above knee amputation for LLE ischemia.     # pre-operative assessment   Defer evaluation of cardiac risk to cardiology consult; agree with recommendation for cardiac anesthesia.   Per EP, patient should be on uninterrupted anticoagulation for 30 days after DCCV.   Per cardiology, would not recommend interrupting anticoagulation if pursuing surgery before 30 day duration is complete.  Timing of Above knee amputation per vascular surgery, and surgical team's assessment of bleeding risk, given EP/Cards recommendations for uninterrupted anti-coagulation   Patient is Intermediate to high risk for an intermediate risk procedure. Given the risk of progression of disease with non-intervention, reasonable to proceed with surgery so long as it remains consistent with patient's goals of care.   Internal medicine service will continue to follow. [facility] if patient's clinical status changes.     # Left leg ischemia - Decision re: timing of Above Knee Amputation per vascular surgery and primary team. Optimizing nutritional status prior to surgery     # Elevated liver tests --- alk phos, ast, alt still uptrending (ALT doubled again in last 2 days)   ddx includes:   mild congestive hepatopathy (less likely, patient has been net negative; patient appears euvolemic, if not slightly dry),   amiodarone tox, lovenox related hepatotoxicity, hepatic venous outflow obstruction    continue to trend daily liver tests; f/u pro-BNP   [ ] Recommend discussing with EP re: decrease in amiodarone dose 200mg qd to 100mg qd given age, low BMI (BMI 16) , frailty, and uptrending liver tests   [ ] Recommend hepatology consult,   [ ] if cause of ALT increase unclear, discuss with hepatology re: switching from lovenox to apixaban for now (could switch back to lovenox when closer to OR date)     # Acute Systolic Heart Failure - Followed by heart failure team; Euvolemic today. Continue metoprolol succinate 25mg qd   # Atrial Flutter - EP following, now s/p DCCV 12/30. EP recommending uninterrupted AC x 30 days (on enoxaparin 60mg SQ q12). On amiodarone 200mg qd, metoprolol succinate 25 qd . Currently in sinus rhythm   # Pulmonary embolism (subsegmental RUL) - After completion of AC for DCCV, would need to continue AC for PE. On lovenox full dose.  # Bowel ischemia - s/p SMA embolectomy; Small bowel resection 80 cm + 40 cm; ileostomy in place; mgmt per primary team   - Monitor ileostomy output on loperamide 4mg TID, tincture of opium QID     # Hypophosphatemia - repleted  # Acute blood loss anemia - Hgb downtrended from time of admission (Hgb 14 --> ~10); partially attributable to operative blood losses (underwent laparotomy x 3);  recommend checking ferritin, iron sat, (ordered) repleting if deficient    # Severe protein-calorie malnutrition   - agree with nutrition service's assessment ; supplemental nutrition per nutrition recs   - Getting dronabinol 5mg q12 hrs     # Sacral wound - continue off loading, dressings  # sleep disturbance -  trial of melatonin 1mg qHS PRN ; 10 AM blood draws     GI PPX - on pantoprazole 40mg PO BID   DVT PPX - already on full dose AC with lovenox     recs: trial of melatonin, f/u liver tests, f/u iron studies,

## 2023-01-10 NOTE — PROGRESS NOTE ADULT - ASSESSMENT
77M with PMHx of IVDU found unresponsive at his nursing home, resolved after Narcan in the field and brought to [facility]. Found to have PE, atrial flutter, and large LV thrombus on echo. Transferred to [facility] for further management. Pt c/o abdominal pain on 12/10 CTA showing mid SMA with embolus. Abnormal distal small bowel loops and cecum with dilatation and pneumatosis suggesting infarcted bowel. One or two tiny foci of intrahepatic portal vein pneumatosis. Segmental infarction upper pole left kidney. Now s/p Ex lap, SMA embolectomy, 80cm SBR, abthera vac left in discontinuity (12/11) and transferred to SICU intubated. S/p second look (12/13) and most recently s/p OR for 3rd look, end-to-end anastomosis of remaining bowel, loop ileostomy and abdomen closure (12/15). Remains in SICU, now extubated with still with limb ischemia to LLE pending amputation. Now stepped down to tele.     Plan for PICC placement and starting TPN   Reg/High fiber diet + ensures, DHT placed for TF, Jevity 1.2@goal   Pain/nausea control  Imodium, metamucin, tinctuire of opium for high ileostomy output  A flutter now s/p cardioversion, amio load, now on amio 200 QD, toprolol XL 25 QD  LV thrombus w LLE limb ischemia- Heparin gtt changed to SQL BID  Planning for AKA with vascular surgery  AM labs

## 2023-01-10 NOTE — PROGRESS NOTE ADULT - ATTENDING COMMENTS
76 yo man with hx of PSA and newly discovered LV dysfunction with LV thrombus. Course complicated by apparent thrombus embolization causing acute mesenteric and LLE ischemia. Underwent partial bowel resection and is now pending plan for LLE surgery/amputation. From a HF perspective he is euvolemic and well compensated only on BB and off diuretics. Underwent DCCV for AFL on 12/30 and is now on Lovenox for a/c.    Main issues at the moment are poor nutrition status and debility.     - Continue Toprol low dose  - Hold diuretics   - No RASi for now   - Continue Lovenox and Amiodarone  - He is optimized from HF standpoint for LLE NED Aponte MD

## 2023-01-10 NOTE — PROGRESS NOTE ADULT - SUBJECTIVE AND OBJECTIVE BOX
SUBJECTIVE:  Patient seen and examined on AM rounds with chief resident. No acute events overnight. This morning, patient is feeling well. Denies abdominal pain, nausea, vomiting, fever, and chills. Tolerating diet.     MEDICATIONS  (STANDING):  aMIOdarone    Tablet 200 milliGRAM(s) Oral daily  ascorbic acid 500 milliGRAM(s) Oral daily  chlorhexidine 2% Cloths 1 Application(s) Topical <User Schedule>  dextrose 5%. 1000 milliLiter(s) (50 mL/Hr) IV Continuous <Continuous>  dextrose 5%. 1000 milliLiter(s) (100 mL/Hr) IV Continuous <Continuous>  diphenoxylate/atropine 2 Tablet(s) Oral three times a day  dronabinol 5 milliGRAM(s) Oral every 12 hours  enoxaparin Injectable 60 milliGRAM(s) SubCutaneous every 12 hours  glucagon  Injectable 1 milliGRAM(s) IntraMuscular once  influenza  Vaccine (HIGH DOSE) 0.7 milliLiter(s) IntraMuscular once  insulin lispro (ADMELOG) corrective regimen sliding scale   SubCutaneous every 6 hours  loperamide 4 milliGRAM(s) Oral three times a day  metoprolol succinate ER 25 milliGRAM(s) Oral daily  multivitamin 1 Tablet(s) Oral daily  opium Tincture 6 milliGRAM(s) Oral four times a day  pantoprazole    Tablet 40 milliGRAM(s) Oral two times a day  psyllium Powder 1 Packet(s) Oral every 12 hours  silver sulfADIAZINE 1% Cream 1 Application(s) Topical daily  zinc sulfate 220 milliGRAM(s) Oral daily    MEDICATIONS  (PRN):  acetaminophen     Tablet .. 650 milliGRAM(s) Oral every 6 hours PRN Temp greater or equal to 38C (100.4F), Mild Pain (1 - 3)  dextrose Oral Gel 15 Gram(s) Oral once PRN Blood Glucose LESS THAN 70 milliGRAM(s)/deciliter  oxyCODONE    IR 5 milliGRAM(s) Oral every 4 hours PRN Severe Pain (7 - 10)      Vital Signs Last 24 Hrs  T(C): 37.2 (10 Ladarius 2023 04:36), Max: 37.5 (09 Jan 2023 22:00)  T(F): 99 (10 Ladarius 2023 04:36), Max: 99.5 (09 Jan 2023 22:00)  HR: 86 (10 Ladarius 2023 06:26) (86 - 98)  BP: 119/67 (10 Ladarius 2023 06:26) (87/68 - 120/76)  BP(mean): 85 (10 Ladarius 2023 06:26) (74 - 92)  RR: 17 (10 Ladarius 2023 06:26) (17 - 18)  SpO2: 97% (10 Ladarius 2023 06:26) (97% - 99%)    Parameters below as of 10 Ladarius 2023 06:26  Patient On (Oxygen Delivery Method): room air    Physical Exam:  General: NAD, resting comfortably in bed  Pulmonary: Nonlabored breathing, no respiratory distress  Cardiovascular: NSR  Abdominal: soft, NT/ND, incision clean, dry, and intact. ileostomy pink and patent with fecal output  Extremities: WWP, normal strength    I&O's Summary    09 Jan 2023 07:01  -  10 Ladarius 2023 07:00  --------------------------------------------------------  IN: 990 mL / OUT: 2650 mL / NET: -1660 mL        LABS:                        11.0   11.77 )-----------( 462      ( 09 Jan 2023 12:34 )             34.9     01-09    137  |  107  |  15  ----------------------------<  131<H>  4.4   |  23  |  0.73    Ca    9.0      09 Jan 2023 12:34  Phos  3.0     01-09  Mg     1.9     01-09    TPro  x   /  Alb  3.0<L>  /  TBili  x   /  DBili  x   /  AST  x   /  ALT  x   /  AlkPhos  x   01-09        CAPILLARY BLOOD GLUCOSE        LIVER FUNCTIONS - ( 09 Jan 2023 12:34 )  Alb: 3.0 g/dL / Pro: x     / ALK PHOS: x     / ALT: x     / AST: x     / GGT: x

## 2023-01-10 NOTE — PROGRESS NOTE ADULT - SUBJECTIVE AND OBJECTIVE BOX
Heart Failure Consult Note    INTERVAL EVENTS:  TELEMETRY:    PAST MEDICAL & SURGICAL HISTORY:      MEDICATIONS  (STANDING):  aMIOdarone    Tablet 200 milliGRAM(s) Oral daily  ascorbic acid 500 milliGRAM(s) Oral daily  chlorhexidine 2% Cloths 1 Application(s) Topical <User Schedule>  chlorhexidine 2% Cloths 1 Application(s) Topical <User Schedule>  dextrose 5%. 1000 milliLiter(s) (50 mL/Hr) IV Continuous <Continuous>  dextrose 5%. 1000 milliLiter(s) (100 mL/Hr) IV Continuous <Continuous>  diphenoxylate/atropine 2 Tablet(s) Oral three times a day  dronabinol 5 milliGRAM(s) Oral every 12 hours  enoxaparin Injectable 60 milliGRAM(s) SubCutaneous every 12 hours  fat emulsion (Fish Oil and Plant Based) 20% Infusion 0.31 Gm/kG/Day (8.33 mL/Hr) IV Continuous <Continuous>  glucagon  Injectable 1 milliGRAM(s) IntraMuscular once  influenza  Vaccine (HIGH DOSE) 0.7 milliLiter(s) IntraMuscular once  insulin lispro (ADMELOG) corrective regimen sliding scale   SubCutaneous every 6 hours  loperamide 4 milliGRAM(s) Oral three times a day  metoprolol succinate ER 25 milliGRAM(s) Oral daily  multivitamin 1 Tablet(s) Oral daily  opium Tincture 6 milliGRAM(s) Oral four times a day  pantoprazole    Tablet 40 milliGRAM(s) Oral two times a day  Parenteral Nutrition - Adult 1 Each (50 mL/Hr) TPN Continuous <Continuous>  psyllium Powder 1 Packet(s) Oral every 12 hours  silver sulfADIAZINE 1% Cream 1 Application(s) Topical daily  zinc sulfate 220 milliGRAM(s) Oral daily    MEDICATIONS  (PRN):  acetaminophen     Tablet .. 650 milliGRAM(s) Oral every 6 hours PRN Temp greater or equal to 38C (100.4F), Mild Pain (1 - 3)  dextrose Oral Gel 15 Gram(s) Oral once PRN Blood Glucose LESS THAN 70 milliGRAM(s)/deciliter  oxyCODONE    IR 5 milliGRAM(s) Oral every 4 hours PRN Severe Pain (7 - 10)  sodium chloride 0.9% lock flush 10 milliLiter(s) IV Push every 1 hour PRN Pre/post blood products, medications, blood draw, and to maintain line patency    Vital Signs Last 24 Hrs  T(C): 36.7 (10 Ladarius 2023 13:37), Max: 37.5 (09 Jan 2023 22:00)  T(F): 98 (10 Ladarius 2023 13:37), Max: 99.5 (09 Jan 2023 22:00)  HR: 88 (10 Ladarius 2023 12:33) (86 - 94)  BP: 107/69 (10 Ladarius 2023 12:33) (87/68 - 120/76)  BP(mean): 84 (10 Ladarius 2023 12:33) (74 - 92)  RR: 18 (10 Ladarius 2023 12:33) (17 - 18)  SpO2: 99% (10 Ladarius 2023 12:33) (97% - 99%)    Parameters below as of 10 Ladarius 2023 12:33  Patient On (Oxygen Delivery Method): room air        PHYSICAL EXAM:  GEN: Awake, alert. NAD.   HEENT: NCAT, PERRL, EOMI. Mucosa moist. No JVD.  RESP: CTA b/l  CV: RRR. Normal S1/S2. No m/r/g.  ABD: Soft. NT/ND. BS+  EXT: Warm. No edema, clubbing, or cyanosis.   NEURO: AAOx3. No focal deficits.     LABS:                        10.9   11.32 )-----------( 432      ( 10 Ladarius 2023 13:00 )             35.1     01-10    136  |  103  |  13  ----------------------------<  119<H>  4.6   |  23  |  0.67    Ca    8.9      10 Ladarius 2023 13:00  Phos  2.9     01-10  Mg     2.0     01-10    TPro  7.1  /  Alb  2.9<L>  /  TBili  0.8  /  DBili  x   /  AST  69<H>  /  ALT  112<H>  /  AlkPhos  187<H>  01-10            I&O's Summary    09 Jan 2023 07:01  -  10 Ladarius 2023 07:00  --------------------------------------------------------  IN: 990 mL / OUT: 2650 mL / NET: -1660 mL    10 Ladarisu 2023 07:01  -  10 Ladarius 2023 14:58  --------------------------------------------------------  IN: 700 mL / OUT: 800 mL / NET: -100 mL      RADIOLOGY & ADDITIONAL STUDIES:    EKG:         Heart Failure Consult Note    INTERVAL EVENTS: Taking in some PO, no shortness of breath, no chest pain.   TELEMETRY: Sinus rhythm    PAST MEDICAL & SURGICAL HISTORY:      MEDICATIONS  (STANDING):  aMIOdarone    Tablet 200 milliGRAM(s) Oral daily  ascorbic acid 500 milliGRAM(s) Oral daily  chlorhexidine 2% Cloths 1 Application(s) Topical <User Schedule>  chlorhexidine 2% Cloths 1 Application(s) Topical <User Schedule>  dextrose 5%. 1000 milliLiter(s) (50 mL/Hr) IV Continuous <Continuous>  dextrose 5%. 1000 milliLiter(s) (100 mL/Hr) IV Continuous <Continuous>  diphenoxylate/atropine 2 Tablet(s) Oral three times a day  dronabinol 5 milliGRAM(s) Oral every 12 hours  enoxaparin Injectable 60 milliGRAM(s) SubCutaneous every 12 hours  fat emulsion (Fish Oil and Plant Based) 20% Infusion 0.31 Gm/kG/Day (8.33 mL/Hr) IV Continuous <Continuous>  glucagon  Injectable 1 milliGRAM(s) IntraMuscular once  influenza  Vaccine (HIGH DOSE) 0.7 milliLiter(s) IntraMuscular once  insulin lispro (ADMELOG) corrective regimen sliding scale   SubCutaneous every 6 hours  loperamide 4 milliGRAM(s) Oral three times a day  metoprolol succinate ER 25 milliGRAM(s) Oral daily  multivitamin 1 Tablet(s) Oral daily  opium Tincture 6 milliGRAM(s) Oral four times a day  pantoprazole    Tablet 40 milliGRAM(s) Oral two times a day  Parenteral Nutrition - Adult 1 Each (50 mL/Hr) TPN Continuous <Continuous>  psyllium Powder 1 Packet(s) Oral every 12 hours  silver sulfADIAZINE 1% Cream 1 Application(s) Topical daily  zinc sulfate 220 milliGRAM(s) Oral daily    MEDICATIONS  (PRN):  acetaminophen     Tablet .. 650 milliGRAM(s) Oral every 6 hours PRN Temp greater or equal to 38C (100.4F), Mild Pain (1 - 3)  dextrose Oral Gel 15 Gram(s) Oral once PRN Blood Glucose LESS THAN 70 milliGRAM(s)/deciliter  oxyCODONE    IR 5 milliGRAM(s) Oral every 4 hours PRN Severe Pain (7 - 10)  sodium chloride 0.9% lock flush 10 milliLiter(s) IV Push every 1 hour PRN Pre/post blood products, medications, blood draw, and to maintain line patency    Vital Signs Last 24 Hrs  T(C): 36.7 (10 Ladarius 2023 13:37), Max: 37.5 (09 Jan 2023 22:00)  T(F): 98 (10 Ladarius 2023 13:37), Max: 99.5 (09 Jan 2023 22:00)  HR: 88 (10 Ladarius 2023 12:33) (86 - 94)  BP: 107/69 (10 Ladarius 2023 12:33) (87/68 - 120/76)  BP(mean): 84 (10 Ladarius 2023 12:33) (74 - 92)  RR: 18 (10 Ladarius 2023 12:33) (17 - 18)  SpO2: 99% (10 Ladarius 2023 12:33) (97% - 99%)    Parameters below as of 10 Ladarius 2023 12:33  Patient On (Oxygen Delivery Method): room air        PHYSICAL EXAM:  GEN: Awake, alert. NAD.   HEENT:Normal JVP  RESP: CTA b/l  CV: RRR. Normal S1/S2  EXT: Warm. No edema  NEURO: AAOx3. No focal deficits.     LABS:                        10.9   11.32 )-----------( 432      ( 10 Ladarius 2023 13:00 )             35.1     01-10    136  |  103  |  13  ----------------------------<  119<H>  4.6   |  23  |  0.67    Ca    8.9      10 Ladarius 2023 13:00  Phos  2.9     01-10  Mg     2.0     01-10    TPro  7.1  /  Alb  2.9<L>  /  TBili  0.8  /  DBili  x   /  AST  69<H>  /  ALT  112<H>  /  AlkPhos  187<H>  01-10            I&O's Summary    09 Jan 2023 07:01  -  10 Ladarius 2023 07:00  --------------------------------------------------------  IN: 990 mL / OUT: 2650 mL / NET: -1660 mL    10 Ladarius 2023 07:01  -  10 Jan 2023 14:58  --------------------------------------------------------  IN: 700 mL / OUT: 800 mL / NET: -100 mL      RADIOLOGY & ADDITIONAL STUDIES:    EKG:

## 2023-01-11 NOTE — PROGRESS NOTE ADULT - ATTENDING COMMENTS
Liver enzymes elevation in patient with multiple comorbid events and conditions  Multifactorial   This can be in part due to the hematoma and release of bilirubin   Rule out portal vein thrombosis   Please see fellow's note

## 2023-01-11 NOTE — PROGRESS NOTE ADULT - SUBJECTIVE AND OBJECTIVE BOX
Patient is a 77y old  Male who presents with a chief complaint of A flutter (11 Jan 2023 12:59)    INTERVAL EVENTS:  underwent CT AP today   mild hyponatremia   mild leukocytosis   slight downtrend in AST/ALT today     MEDICATIONS:  MEDICATIONS  (STANDING):  aMIOdarone    Tablet 100 milliGRAM(s) Oral every 24 hours  ascorbic acid 500 milliGRAM(s) Oral daily  chlorhexidine 2% Cloths 1 Application(s) Topical <User Schedule>  chlorhexidine 2% Cloths 1 Application(s) Topical <User Schedule>  dextrose 5%. 1000 milliLiter(s) (50 mL/Hr) IV Continuous <Continuous>  dextrose 5%. 1000 milliLiter(s) (100 mL/Hr) IV Continuous <Continuous>  diphenoxylate/atropine 2 Tablet(s) Oral three times a day  dronabinol 5 milliGRAM(s) Oral every 12 hours  enoxaparin Injectable 60 milliGRAM(s) SubCutaneous every 12 hours  fat emulsion (Fish Oil and Plant Based) 20% Infusion 0.31 Gm/kG/Day (8.33 mL/Hr) IV Continuous <Continuous>  glucagon  Injectable 1 milliGRAM(s) IntraMuscular once  influenza  Vaccine (HIGH DOSE) 0.7 milliLiter(s) IntraMuscular once  insulin lispro (ADMELOG) corrective regimen sliding scale   SubCutaneous every 6 hours  loperamide 4 milliGRAM(s) Oral three times a day  metoprolol succinate ER 25 milliGRAM(s) Oral daily  multivitamin 1 Tablet(s) Oral daily  opium Tincture 6 milliGRAM(s) Oral four times a day  pantoprazole    Tablet 40 milliGRAM(s) Oral two times a day  Parenteral Nutrition - Adult 1 Each (50 mL/Hr) TPN Continuous <Continuous>  Parenteral Nutrition - Adult 1 Each (50 mL/Hr) TPN Continuous <Continuous>  psyllium Powder 1 Packet(s) Oral every 12 hours  silver sulfADIAZINE 1% Cream 1 Application(s) Topical daily  zinc sulfate 220 milliGRAM(s) Oral daily    MEDICATIONS  (PRN):  acetaminophen     Tablet .. 650 milliGRAM(s) Oral every 6 hours PRN Temp greater or equal to 38C (100.4F), Mild Pain (1 - 3)  dextrose Oral Gel 15 Gram(s) Oral once PRN Blood Glucose LESS THAN 70 milliGRAM(s)/deciliter  oxyCODONE    IR 5 milliGRAM(s) Oral every 4 hours PRN Severe Pain (7 - 10)  sodium chloride 0.9% lock flush 10 milliLiter(s) IV Push every 1 hour PRN Pre/post blood products, medications, blood draw, and to maintain line patency    Allergies    No Known Allergies    Intolerances    OBJECTIVE:  Vital Signs Last 24 Hrs  T(C): 36.6 (11 Jan 2023 17:43), Max: 37.3 (10 Ladarius 2023 21:15)  T(F): 97.9 (11 Jan 2023 17:43), Max: 99.2 (10 Ladarius 2023 21:15)  HR: 92 (11 Jan 2023 20:00) (88 - 98)  BP: 121/68 (11 Jan 2023 20:00) (86/54 - 121/68)  BP(mean): 88 (11 Jan 2023 20:00) (64 - 89)  RR: 17 (11 Jan 2023 20:00) (17 - 18)  SpO2: 100% (11 Jan 2023 20:00) (98% - 100%)    Parameters below as of 11 Jan 2023 20:00  Patient On (Oxygen Delivery Method): room air      I&O's Summary    10 Ladarius 2023 07:01  -  11 Jan 2023 07:00  --------------------------------------------------------  IN: 1699.6 mL / OUT: 2725 mL / NET: -1025.4 mL    11 Jan 2023 07:01  -  11 Jan 2023 20:07  --------------------------------------------------------  IN: 1033.2 mL / OUT: 1450 mL / NET: -416.8 mL    PHYSICAL EXAM:  Gen: Reclining in bed at time of exam, appears stated age  HEENT: NCAT, MMM, clear OP  CV: RRR,   Pulm: adequate respiratory effort, no increase in work of breathing  Ext: WWP, no LE edema    LABS:                        10.8   12.85 )-----------( 423      ( 11 Jan 2023 14:33 )             34.8     01-11    131<L>  |  100  |  11  ----------------------------<  126<H>  4.0   |  26  |  0.73    Ca    8.5      11 Jan 2023 14:33  Phos  2.6     01-11  Mg     1.8     01-11    TPro  7.3  /  Alb  3.3  /  TBili  0.8  /  DBili  x   /  AST  41<H>  /  ALT  86<H>  /  AlkPhos  178<H>  01-11    LIVER FUNCTIONS - ( 11 Jan 2023 14:33 )  Alb: 3.3 g/dL / Pro: 7.3 g/dL / ALK PHOS: 178 U/L / ALT: 86 U/L / AST: 41 U/L / GGT: x             CAPILLARY BLOOD GLUCOSE    MICRODATA:      RADIOLOGY/OTHER STUDIES:

## 2023-01-11 NOTE — PROGRESS NOTE ADULT - ASSESSMENT
77M with PMHx of IVDU found unresponsive at his nursing home, resolved after Narcan in the field and brought to [facility]. Found to have PE, atrial flutter, and large LV thrombus on echo. Transferred to [facility] for further management with complicated inpt source, summarized as follows    -Abdominal pain on 12/10 CTA showing mid SMA with embolus. Abnormal distal small bowel loops and cecum with dilatation and pneumatosis suggesting infarcted bowel. One or two tiny foci of intrahepatic portal vein pneumatosis. Segmental infarction upper pole left kidney.  -S/p Ex lap, SMA embolectomy, 80cm SBR, abthera vac left in discontinuity (12/11) and transferred to SICU intubated.   -S/p second look (12/13) and most recently s/p OR for 3rd look, end-to-end anastomosis of remaining bowel, loop ileostomy and abdomen closure (12/15)  -Continued  limb ischemia to LLE pending amputation    GI consulted for uptrending Alk phos/ALT/AST    #Elevated liver enzymes:  Normal bili.  Uptrend noted since Jan 1 when liver enzymes nml.  No abd pain.    Med rec reviewed without clear offending etiology to explain DILI    Hep C ab notably positive, though RNA negative    Recommendations:  -Please check Hep A ab and Hep B surface Ag, surface Ab and Core Ab  -Add CK and GGT to help elucidate etiology of Alk phos in setting of ongoing limb ischemia  -Pending repeat CT abd/pelvis as recommended initially   -Consider checking CK  -Trend daily CBC, CMP, coags    Eugenio Meadows DO  Gastroenterology Fellow  Pager: [phone] 77M with PMHx of IVDU found unresponsive at his nursing home, resolved after Narcan in the field and brought to [facility]. Found to have PE, atrial flutter, and large LV thrombus on echo. Transferred to [facility] for further management with complicated inpt source, summarized as follows    -Abdominal pain on 12/10 CTA showing mid SMA with embolus. Abnormal distal small bowel loops and cecum with dilatation and pneumatosis suggesting infarcted bowel. One or two tiny foci of intrahepatic portal vein pneumatosis. Segmental infarction upper pole left kidney.  -S/p Ex lap, SMA embolectomy, 80cm SBR, abthera vac left in discontinuity (12/11) and transferred to SICU intubated.   -S/p second look (12/13) and most recently s/p OR for 3rd look, end-to-end anastomosis of remaining bowel, loop ileostomy and abdomen closure (12/15)  -Continued  limb ischemia to LLE pending amputation    GI consulted for uptrending Alk phos/ALT/AST    #Elevated liver enzymes:  Normal bili.  Uptrend noted since Jan 1 when liver enzymes nml.  No abd pain.    Med rec reviewed without clear offending etiology to explain DILI    Hep C ab notably positive, though RNA negative    CT scan reviewed, with some interval improvement of hemiabdomen hematoma, though no findings to explain liver enzyme abnormality which is somewhat improved today.    Suspect hepatocellular liver enzyme elevation multifactorial, and due to multimorbid and ongoing illness.  Imaging reassuring against hepatobiliary process to explain findings.     Recommendations:  -Please check Hep A ab and Hep B surface Ag, surface Ab and Core Ab  -Add CK and GGT to help elucidate etiology of Alk phos in setting of ongoing limb ischemia  -Consider checking CK  -Trend daily CBC, CMP, coags    Eugenio Meadows DO  Gastroenterology Fellow  Pager: [phone]

## 2023-01-11 NOTE — PROGRESS NOTE ADULT - SUBJECTIVE AND OBJECTIVE BOX
GI svc reconsulted; last seen Dec 22 for suspected GIB with plans for consv mgmt.  Pt known to svc.  Inpt chart reviewed  Pt seen and examined at bedside.  Resting comfortably, preferring not to be awoken but denies abd pain or blood from ostomy.  Nods head 'no' when asked if any bothersome sx.    Per surgical notes    STATUS POST:    12/11: Ex lap, SMA embolectomy, 80cm SBR, abthera vac  12/13: Second look, SBR  12/15: Ex-lap, no dead gut, DANIEL of discontinuous gut, loop ileostomy; EBL minimal, 1L crystalloid, UOP 150mL     Allergies    No Known Allergies    Intolerances        MEDICATIONS:  MEDICATIONS  (STANDING):  aMIOdarone    Tablet 100 milliGRAM(s) Oral every 24 hours  ascorbic acid 500 milliGRAM(s) Oral daily  chlorhexidine 2% Cloths 1 Application(s) Topical <User Schedule>  chlorhexidine 2% Cloths 1 Application(s) Topical <User Schedule>  dextrose 5%. 1000 milliLiter(s) (50 mL/Hr) IV Continuous <Continuous>  dextrose 5%. 1000 milliLiter(s) (100 mL/Hr) IV Continuous <Continuous>  diphenoxylate/atropine 2 Tablet(s) Oral three times a day  dronabinol 5 milliGRAM(s) Oral every 12 hours  enoxaparin Injectable 60 milliGRAM(s) SubCutaneous every 12 hours  fat emulsion (Fish Oil and Plant Based) 20% Infusion 0.31 Gm/kG/Day (8.33 mL/Hr) IV Continuous <Continuous>  glucagon  Injectable 1 milliGRAM(s) IntraMuscular once  influenza  Vaccine (HIGH DOSE) 0.7 milliLiter(s) IntraMuscular once  insulin lispro (ADMELOG) corrective regimen sliding scale   SubCutaneous every 6 hours  loperamide 4 milliGRAM(s) Oral three times a day  metoprolol succinate ER 25 milliGRAM(s) Oral daily  multivitamin 1 Tablet(s) Oral daily  opium Tincture 6 milliGRAM(s) Oral four times a day  pantoprazole    Tablet 40 milliGRAM(s) Oral two times a day  Parenteral Nutrition - Adult 1 Each (50 mL/Hr) TPN Continuous <Continuous>  psyllium Powder 1 Packet(s) Oral every 12 hours  silver sulfADIAZINE 1% Cream 1 Application(s) Topical daily  zinc sulfate 220 milliGRAM(s) Oral daily    MEDICATIONS  (PRN):  acetaminophen     Tablet .. 650 milliGRAM(s) Oral every 6 hours PRN Temp greater or equal to 38C (100.4F), Mild Pain (1 - 3)  dextrose Oral Gel 15 Gram(s) Oral once PRN Blood Glucose LESS THAN 70 milliGRAM(s)/deciliter  oxyCODONE    IR 5 milliGRAM(s) Oral every 4 hours PRN Severe Pain (7 - 10)  sodium chloride 0.9% lock flush 10 milliLiter(s) IV Push every 1 hour PRN Pre/post blood products, medications, blood draw, and to maintain line patency      Vital Signs Last 24 Hrs  T(C): 37.2 (11 Jan 2023 08:57), Max: 37.7 (10 Ladarius 2023 16:31)  T(F): 98.9 (11 Jan 2023 08:57), Max: 99.8 (10 Ladarius 2023 16:31)  HR: 88 (11 Jan 2023 03:45) (84 - 96)  BP: 113/75 (11 Jan 2023 03:45) (98/63 - 115/68)  BP(mean): 89 (11 Jan 2023 03:45) (76 - 90)  RR: 17 (11 Jan 2023 03:45) (17 - 18)  SpO2: 98% (10 Ladarius 2023 23:43) (98% - 99%)    Parameters below as of 11 Jan 2023 03:45  Patient On (Oxygen Delivery Method): room air        01-10 @ 07:01  -  01-11 @ 07:00  --------------------------------------------------------  IN: 1699.6 mL / OUT: 2725 mL / NET: -1025.4 mL        PHYSICAL EXAM:  General: NAD  Respiratory: non labored breathing, no respiratory distress  Gastrointestinal: abdomen soft, non tender/non distended, incision c/d/i. Ileostomy w/ stoma pink tissue. Brown stool without blood.    LABS:  CBC Full  -  ( 10 Ladarius 2023 13:00 )  WBC Count : 11.32 K/uL  RBC Count : 3.86 M/uL  Hemoglobin : 10.9 g/dL  Hematocrit : 35.1 %  Platelet Count - Automated : 432 K/uL  Mean Cell Volume : 90.9 fl  Mean Cell Hemoglobin : 28.2 pg  Mean Cell Hemoglobin Concentration : 31.1 gm/dL  Auto Neutrophil # : 8.41 K/uL  Auto Lymphocyte # : 1.89 K/uL  Auto Monocyte # : 0.67 K/uL  Auto Eosinophil # : 0.13 K/uL  Auto Basophil # : 0.08 K/uL  Auto Neutrophil % : 74.4 %  Auto Lymphocyte % : 16.7 %  Auto Monocyte % : 5.9 %  Auto Eosinophil % : 1.1 %  Auto Basophil % : 0.7 %    01-10    136  |  103  |  13  ----------------------------<  119<H>  4.6   |  23  |  0.67    Ca    8.9      10 Ladarius 2023 13:00  Phos  2.9     01-10  Mg     2.0     01-10    TPro  7.1  /  Alb  2.9<L>  /  TBili  0.8  /  DBili  x   /  AST  69<H>  /  ALT  112<H>  /  AlkPhos  187<H>  01-10    RADIOLOGY & ADDITIONAL STUDIES:    ACC: [id number] EXAM:  US ABDOMEN LIMITED                          PROCEDURE DATE:  01/01/2023          INTERPRETATION:  CLINICAL INFORMATION: Increasing total bilirubin.    COMPARISON: CTA abdomen pelvis 12/22/2022. Retroperitoneal ultrasound   from 12/12/2022. CTA of abdomen and pelvis from 12/11/2022.    TECHNIQUE: Sonography of the right upper quadrant.    FINDINGS:    Liver: Within normal limits.  Bile ducts: Mildly dilated common bile duct measures 0.9 cm. No   intrahepatic biliary ductal dilatation.  Gallbladder: Limited evaluation as the gallbladder is contracted. No wall   thickening. Small stones and sludge.  Pancreas: Visualized portions are within normal limits.  Right kidney: 9.9 cm in length. No hydronephrosis. 1 cm cyst.  Ascites: Small.  Vessels: Dilated inferior vena cava, possibly due to right heart failure.   Visualized portions of aorta are within normal limits. Normal hepatopetal   blood flow main portal vein. Visualized portal and hepatic veins patent.  Other: Known large right hemiabdomen hematoma is partially visualized due   to the presence of right lower quadrant ostomy, measuring at least 10.1 x   7.2 cm.      IMPRESSION:  1. Mildly dilated common bile duct, of uncertain significance. If   indicated, MRCP could be performed for further evaluation.    2. Small stones and sludge in contracted gallbladder.    3. Small ascites.    4. Known large hematoma in right abdomen is only partially visualized.    5. Dilated inferior vena cava, possibly due to right heart failure.          --- End of Report ---          HIPOLITO RODRIGUEZ MD; Resident Radiologist  This document has been electronically signed.  OLIVIA MORA MD; Attending Radiologist  This document has been electronically signed. Jan 2 2023 12:15PM     GI svc reconsulted; last seen Dec 22 for suspected GIB with plans for consv mgmt.  Pt known to svc.  Inpt chart reviewed  Pt seen and examined at bedside.  Resting comfortably, preferring not to be awoken but denies abd pain or blood from ostomy.  Nods head 'no' when asked if any bothersome sx.    Per surgical notes    STATUS POST:    12/11: Ex lap, SMA embolectomy, 80cm SBR, abthera vac  12/13: Second look, SBR  12/15: Ex-lap, no dead gut, DANIEL of discontinuous gut, loop ileostomy; EBL minimal, 1L crystalloid, UOP 150mL     Allergies    No Known Allergies    Intolerances        MEDICATIONS:  MEDICATIONS  (STANDING):  aMIOdarone    Tablet 100 milliGRAM(s) Oral every 24 hours  ascorbic acid 500 milliGRAM(s) Oral daily  chlorhexidine 2% Cloths 1 Application(s) Topical <User Schedule>  chlorhexidine 2% Cloths 1 Application(s) Topical <User Schedule>  dextrose 5%. 1000 milliLiter(s) (50 mL/Hr) IV Continuous <Continuous>  dextrose 5%. 1000 milliLiter(s) (100 mL/Hr) IV Continuous <Continuous>  diphenoxylate/atropine 2 Tablet(s) Oral three times a day  dronabinol 5 milliGRAM(s) Oral every 12 hours  enoxaparin Injectable 60 milliGRAM(s) SubCutaneous every 12 hours  fat emulsion (Fish Oil and Plant Based) 20% Infusion 0.31 Gm/kG/Day (8.33 mL/Hr) IV Continuous <Continuous>  glucagon  Injectable 1 milliGRAM(s) IntraMuscular once  influenza  Vaccine (HIGH DOSE) 0.7 milliLiter(s) IntraMuscular once  insulin lispro (ADMELOG) corrective regimen sliding scale   SubCutaneous every 6 hours  loperamide 4 milliGRAM(s) Oral three times a day  metoprolol succinate ER 25 milliGRAM(s) Oral daily  multivitamin 1 Tablet(s) Oral daily  opium Tincture 6 milliGRAM(s) Oral four times a day  pantoprazole    Tablet 40 milliGRAM(s) Oral two times a day  Parenteral Nutrition - Adult 1 Each (50 mL/Hr) TPN Continuous <Continuous>  psyllium Powder 1 Packet(s) Oral every 12 hours  silver sulfADIAZINE 1% Cream 1 Application(s) Topical daily  zinc sulfate 220 milliGRAM(s) Oral daily    MEDICATIONS  (PRN):  acetaminophen     Tablet .. 650 milliGRAM(s) Oral every 6 hours PRN Temp greater or equal to 38C (100.4F), Mild Pain (1 - 3)  dextrose Oral Gel 15 Gram(s) Oral once PRN Blood Glucose LESS THAN 70 milliGRAM(s)/deciliter  oxyCODONE    IR 5 milliGRAM(s) Oral every 4 hours PRN Severe Pain (7 - 10)  sodium chloride 0.9% lock flush 10 milliLiter(s) IV Push every 1 hour PRN Pre/post blood products, medications, blood draw, and to maintain line patency      Vital Signs Last 24 Hrs  T(C): 37.2 (11 Jan 2023 08:57), Max: 37.7 (10 Ladarius 2023 16:31)  T(F): 98.9 (11 Jan 2023 08:57), Max: 99.8 (10 Ladarius 2023 16:31)  HR: 88 (11 Jan 2023 03:45) (84 - 96)  BP: 113/75 (11 Jan 2023 03:45) (98/63 - 115/68)  BP(mean): 89 (11 Jan 2023 03:45) (76 - 90)  RR: 17 (11 Jan 2023 03:45) (17 - 18)  SpO2: 98% (10 Ladarius 2023 23:43) (98% - 99%)    Parameters below as of 11 Jan 2023 03:45  Patient On (Oxygen Delivery Method): room air        01-10 @ 07:01  -  01-11 @ 07:00  --------------------------------------------------------  IN: 1699.6 mL / OUT: 2725 mL / NET: -1025.4 mL        PHYSICAL EXAM:  General: NAD  Respiratory: non labored breathing, no respiratory distress  Gastrointestinal: abdomen soft, non tender/non distended, incision c/d/i. Ileostomy w/ stoma pink tissue. Brown stool without blood.    LABS:  CBC Full  -  ( 10 Ladarius 2023 13:00 )  WBC Count : 11.32 K/uL  RBC Count : 3.86 M/uL  Hemoglobin : 10.9 g/dL  Hematocrit : 35.1 %  Platelet Count - Automated : 432 K/uL  Mean Cell Volume : 90.9 fl  Mean Cell Hemoglobin : 28.2 pg  Mean Cell Hemoglobin Concentration : 31.1 gm/dL  Auto Neutrophil # : 8.41 K/uL  Auto Lymphocyte # : 1.89 K/uL  Auto Monocyte # : 0.67 K/uL  Auto Eosinophil # : 0.13 K/uL  Auto Basophil # : 0.08 K/uL  Auto Neutrophil % : 74.4 %  Auto Lymphocyte % : 16.7 %  Auto Monocyte % : 5.9 %  Auto Eosinophil % : 1.1 %  Auto Basophil % : 0.7 %    01-10    136  |  103  |  13  ----------------------------<  119<H>  4.6   |  23  |  0.67    Ca    8.9      10 Ladarius 2023 13:00  Phos  2.9     01-10  Mg     2.0     01-10    TPro  7.1  /  Alb  2.9<L>  /  TBili  0.8  /  DBili  x   /  AST  69<H>  /  ALT  112<H>  /  AlkPhos  187<H>  01-10    RADIOLOGY & ADDITIONAL STUDIES:    ACC: [id number] EXAM:  US ABDOMEN LIMITED                          PROCEDURE DATE:  01/01/2023          INTERPRETATION:  CLINICAL INFORMATION: Increasing total bilirubin.    COMPARISON: CTA abdomen pelvis 12/22/2022. Retroperitoneal ultrasound   from 12/12/2022. CTA of abdomen and pelvis from 12/11/2022.    TECHNIQUE: Sonography of the right upper quadrant.    FINDINGS:    Liver: Within normal limits.  Bile ducts: Mildly dilated common bile duct measures 0.9 cm. No   intrahepatic biliary ductal dilatation.  Gallbladder: Limited evaluation as the gallbladder is contracted. No wall   thickening. Small stones and sludge.  Pancreas: Visualized portions are within normal limits.  Right kidney: 9.9 cm in length. No hydronephrosis. 1 cm cyst.  Ascites: Small.  Vessels: Dilated inferior vena cava, possibly due to right heart failure.   Visualized portions of aorta are within normal limits. Normal hepatopetal   blood flow main portal vein. Visualized portal and hepatic veins patent.  Other: Known large right hemiabdomen hematoma is partially visualized due   to the presence of right lower quadrant ostomy, measuring at least 10.1 x   7.2 cm.      IMPRESSION:  1. Mildly dilated common bile duct, of uncertain significance. If   indicated, MRCP could be performed for further evaluation.    2. Small stones and sludge in contracted gallbladder.    3. Small ascites.    4. Known large hematoma in right abdomen is only partially visualized.    5. Dilated inferior vena cava, possibly due to right heart failure.          --- End of Report ---          HIPOLITO RODRIGUEZ MD; Resident Radiologist  This document has been electronically signed.  OLIVIA MORA MD; Attending Radiologist  This document has been electronically signed. Jan 2 2023 12:15PM      ACC: [id number] EXAM:  CT ABDOMEN AND PELVIS IC                          PROCEDURE DATE:  01/11/2023          INTERPRETATION:  CLINICAL INFORMATION: Evaluate for cholecystitis    COMPARISON: December 22, 2022    CONTRAST/COMPLICATIONS:  IV Contrast: Isovue 370  90 cc administered   10 cc discarded  Oral Contrast: NONE  Complications: None reported at time of study completion    PROCEDURE:  CT of the Abdomen and Pelvis was performed.  Sagittal and coronal reformats were performed.    FINDINGS:  LOWER CHEST: Resolved bilateral pleural effusions.. Emphysema and right   middle lobe scaring.    LIVER: No suspicious lesion. Portal, hepatic veins and hepatic artery are   patent.  BILE DUCTS: No intrahepatic biliary dilatation. Mild common bile duct   prominence, no obstructing lesion.  GALLBLADDER: Within normal limits.  SPLEEN: Within normal limits.  PANCREAS: Within normal limits.  ADRENALS: Within normal limits.  KIDNEYS/URETERS: Decreased left upper pole cortical infarct. Bilateral   cysts. No hydronephrosis.    BLADDER: Within normal limits.  REPRODUCTIVE ORGANS: Enlarged prostate    BOWEL/ PERITONEUM: Decreased right hemiabdomen hematoma, now mostly fluid   containing collection with thin rim of enhancement and mass effect on   gastric outlet, gallbladder and bowel, 10.7 x 5.8 x 11.8 cm, previously   13 x 9.6 x 15 cm. No bowel obstruction. Bowel wall appears   enhancement/perfused. Decreased ascites  VESSELS: Patient mesenteric vasculature, including SMA and GRUPO remain   patent.  RETROPERITONEUM/LYMPH NODES: No adenopathy  ABDOMINAL WALL: Right lower quadrant ileostomy.  BONES: No suspicious lesion. Decreased anasarca.    IMPRESSION:    1. No acute cholecystitis.  2. Since December 22, 2022, decreased right hemiabdomen hematoma, now   predominantly fluid containing 10-12 cm collection with mass effect to   adjacent organs.  3. Resolved pleural effusions, decreased ascites and anasarca.        --- End of Report ---          CHIDI BRAVO MD; Attending Radiologist  This document has been electronically signed.  CHIDI BRAVO MD; Attending Radiologist  This document has been electronically signed. Jan 11 2023  2:13PM

## 2023-01-11 NOTE — PROGRESS NOTE ADULT - ASSESSMENT
77 year old man who first presented to [facility] from [facility] due to unresponsiveness (responded to Narcan). At [facility], found to have subsegmental pulmonary embolism in RUL, rapid atrial flutter with severely reduced LVEF with LV thrombus. Transferred to [facility] on 12/7/22 for LORENZO and possible ablation. At [facility], was found to have left leg ischemia (left leg arterial thrombus on LE duplex on 12/8). Was being considered for rhythm control when he developed abdominal pain, CTA (12/10/22) showed embolus in SMA, bowel infarct, requiring ex-lap on 12/11 with SMA embolectomy, 80cm small bowel resection; On 12/13, underwent 2nd look laparotomy, with 80cm small bowel resection, closure with abthera. On 12/15, underwent 3rd look laparotomy, end-to-end anastomosis of remaining bowel, loop ileostomy and abdominal closure. Now extubated, on tele floor. Eventually underwent LORENZO/DCCV on 12/30/22, now in sinus rhythm. Currently being evaluated for possible above knee amputation for LLE ischemia (possibly deferring till next hospitalization, after optimization of nutritional status)      # Left leg ischemia - Decision re: timing of Above Knee Amputation per vascular surgery and primary team. Optimizing nutritional status prior to surgery     # Elevated liver tests ---   - amiodarone dose decreased from 200mg qd to 100mg qd given age, low BMI (BMI 16) , frailty, and uptrending liver tests   liver tests downtrending today   defer recs for rest of workup to hepatology consutl     # Acute Systolic Heart Failure - Followed by heart failure team; Euvolemic today. pro-BNP only 300. Continue metoprolol succinate 25mg qd   # Atrial Flutter - EP following, now s/p DCCV 12/30. EP recommending uninterrupted AC x 30 days (on enoxaparin 60mg SQ q12). On amiodarone 200mg qd, metoprolol succinate 25 qd . Currently in sinus rhythm   # Pulmonary embolism (subsegmental RUL) - After completion of AC for DCCV, would need to continue AC for PE. On lovenox full dose.  # Bowel ischemia - s/p SMA embolectomy; Small bowel resection 80 cm + 40 cm; ileostomy in place; mgmt per primary team   - Monitor ileostomy output on loperamide 4mg TID, tincture of opium QID     # Hypophosphatemia - repleted  # Acute blood loss anemia - Hgb downtrended from time of admission (Hgb 14 --> ~10); partially attributable to operative blood losses (underwent laparotomy x 3);    # hyponatremia - check serum osmol, urine osmol, urine Na     # Severe protein-calorie malnutrition   - agree with nutrition service's assessment ; supplemental nutrition per nutrition recs   - Getting dronabinol 5mg q12 hrs     # Sacral wound - continue off loading, dressings  # sleep disturbance -  trial of melatonin 1mg qHS PRN ; 10 AM blood draws     GI PPX - on pantoprazole 40mg PO BID   DVT PPX - already on full dose AC with lovenox     recs: trial of melatonin, f/u liver tests, f/u iron studies,

## 2023-01-11 NOTE — PROGRESS NOTE ADULT - ASSESSMENT
77y y/o Male with significant PMHx of IVDU found unresponsive at his nursing home, resolved after Narcan in the field, and brought to [facility]. Found to have PE, atrial flutter, and large LV thrombus on echo. Transferred to [facility] for further management. Pt C/o abdominal pain on 12/10 CTA showing mid SMA with embolus. Abnormal distal small bowel loops and cecum with dilatation and pneumatosis suggesting infarcted bowel. One or two tiny foci of  intrahepatic portal vein pneumatosis. Segmental infarction upper pole left kidney. Now s/p Ex lap, SMA embolectomy, 80cm SBR, abthera vac left in discontinuity (12/11) and transferred to SICU intubated. S/p second look (12/13) and most recently s/p OR for 3rd look, end-to-end anastomosis of remaining bowel, loop ileostomy and abdomen closure (12/15). Now stepped down with limb ischemia to LLE pending amputation. Ischemia has not yet fully demarcated, it is clear that the posterior calf (which is needed to heal a BKA flap) is involved and therefore only surgical option available to the patient is an AKA pending medical optimization.     Recommendations:    - Plan for AKA once patient's clinical and nutritional status has improved  - Appreciate cardiology/ep recs   - Appreciate Hospitalist recs for preop clearance  - Continue local wound care with betadine to all skin below the line of demarcation of his left shin and kerlix. No offloading boot, keep left heel elevated off bed.   - Continue supportive measures  - Rest of care per primary team   - Vascular surgery Team 3C will continue to follow. Please call [phone] with any questions or concerns   77y y/o Male with significant PMHx of IVDU found unresponsive at his nursing home, resolved after Narcan in the field, and brought to [facility]. Found to have PE, atrial flutter, and large LV thrombus on echo. Transferred to [facility] for further management. Pt C/o abdominal pain on 12/10 CTA showing mid SMA with embolus. Abnormal distal small bowel loops and cecum with dilatation and pneumatosis suggesting infarcted bowel. One or two tiny foci of  intrahepatic portal vein pneumatosis. Segmental infarction upper pole left kidney. Now s/p Ex lap, SMA embolectomy, 80cm SBR, abthera vac left in discontinuity (12/11) and transferred to SICU intubated. S/p second look (12/13) and most recently s/p OR for 3rd look, end-to-end anastomosis of remaining bowel, loop ileostomy and abdomen closure (12/15). Now stepped down with limb ischemia to LLE pending amputation. Ischemia has not yet fully demarcated, it is clear that the posterior calf (which is needed to heal a BKA flap) is involved and therefore only surgical option available to the patient is an AKA pending medical optimization.     Recommendations:    - If clinically feasible from primary team, patient okay to be discharged, no vascular surgery intervention precluding discharge  - Patient can represent at another time when nutritional status is optimized for elective BKA  - Continue local wound care with betadine to all skin below the line of demarcation of his left shin and kerlix. No offloading boot, keep left heel elevated off bed.   - Continue supportive measures  - Rest of care per primary team   - Vascular surgery Team 3C will continue to follow. Please call [phone] with any questions or concerns   77y y/o Male with significant PMHx of IVDU found unresponsive at his nursing home, resolved after Narcan in the field, and brought to [facility]. Found to have PE, atrial flutter, and large LV thrombus on echo. Transferred to [facility] for further management. Pt C/o abdominal pain on 12/10 CTA showing mid SMA with embolus. Abnormal distal small bowel loops and cecum with dilatation and pneumatosis suggesting infarcted bowel. One or two tiny foci of  intrahepatic portal vein pneumatosis. Segmental infarction upper pole left kidney. Now s/p Ex lap, SMA embolectomy, 80cm SBR, abthera vac left in discontinuity (12/11) and transferred to SICU intubated. S/p second look (12/13) and most recently s/p OR for 3rd look, end-to-end anastomosis of remaining bowel, loop ileostomy and abdomen closure (12/15). Now stepped down with limb ischemia to LLE pending amputation. Ischemia has not yet fully demarcated, it is clear that the posterior calf (which is needed to heal a BKA flap) is involved and therefore only surgical option available to the patient is an AKA pending medical optimization.     Recommendations:    - If clinically feasible from primary team perspective, patient okay to be discharged, no vascular surgery intervention precluding discharge  - Patient can represent at another time when nutritional status is optimized for elective BKA  - Continue local wound care with betadine to all skin below the line of demarcation of his left shin and kerlix. No offloading boot, keep left heel elevated off bed.   - Continue supportive measures  - Rest of care per primary team   - Vascular surgery Team 3C will continue to follow. Please call [phone] with any questions or concerns

## 2023-01-11 NOTE — PROGRESS NOTE ADULT - SUBJECTIVE AND OBJECTIVE BOX
INTERVAL HPI/OVERNIGHT EVENTS: CT scan per GI, MAYO, VSS    STATUS POST:    12/11: Ex lap, SMA embolectomy, 80cm SBR, abthera vac  12/13: Second look, SBR  12/15: Ex-lap, no dead gut, DANIEL of discontinuous gut, loop ileostomy; EBL minimal, 1L crystalloid, UOP 150mL     SUBJECTIVE: Pt seen and examined at bedside this am by surgery team. No acute complaints. Tolerating diet, pain well controlled. Denies f/n/v/cp/sob.    MEDICATIONS  (STANDING):  aMIOdarone    Tablet 100 milliGRAM(s) Oral every 24 hours  ascorbic acid 500 milliGRAM(s) Oral daily  chlorhexidine 2% Cloths 1 Application(s) Topical <User Schedule>  chlorhexidine 2% Cloths 1 Application(s) Topical <User Schedule>  dextrose 5%. 1000 milliLiter(s) (50 mL/Hr) IV Continuous <Continuous>  dextrose 5%. 1000 milliLiter(s) (100 mL/Hr) IV Continuous <Continuous>  diphenoxylate/atropine 2 Tablet(s) Oral three times a day  dronabinol 5 milliGRAM(s) Oral every 12 hours  enoxaparin Injectable 60 milliGRAM(s) SubCutaneous every 12 hours  fat emulsion (Fish Oil and Plant Based) 20% Infusion 0.31 Gm/kG/Day (8.33 mL/Hr) IV Continuous <Continuous>  glucagon  Injectable 1 milliGRAM(s) IntraMuscular once  influenza  Vaccine (HIGH DOSE) 0.7 milliLiter(s) IntraMuscular once  insulin lispro (ADMELOG) corrective regimen sliding scale   SubCutaneous every 6 hours  loperamide 4 milliGRAM(s) Oral three times a day  metoprolol succinate ER 25 milliGRAM(s) Oral daily  multivitamin 1 Tablet(s) Oral daily  opium Tincture 6 milliGRAM(s) Oral four times a day  pantoprazole    Tablet 40 milliGRAM(s) Oral two times a day  Parenteral Nutrition - Adult 1 Each (50 mL/Hr) TPN Continuous <Continuous>  psyllium Powder 1 Packet(s) Oral every 12 hours  silver sulfADIAZINE 1% Cream 1 Application(s) Topical daily  zinc sulfate 220 milliGRAM(s) Oral daily    MEDICATIONS  (PRN):  acetaminophen     Tablet .. 650 milliGRAM(s) Oral every 6 hours PRN Temp greater or equal to 38C (100.4F), Mild Pain (1 - 3)  dextrose Oral Gel 15 Gram(s) Oral once PRN Blood Glucose LESS THAN 70 milliGRAM(s)/deciliter  oxyCODONE    IR 5 milliGRAM(s) Oral every 4 hours PRN Severe Pain (7 - 10)  sodium chloride 0.9% lock flush 10 milliLiter(s) IV Push every 1 hour PRN Pre/post blood products, medications, blood draw, and to maintain line patency    Vital Signs Last 24 Hrs  T(C): 35.8 (11 Jan 2023 04:38), Max: 37.7 (10 Ladarius 2023 16:31)  T(F): 96.4 (11 Jan 2023 04:38), Max: 99.8 (10 Ladarius 2023 16:31)  HR: 88 (11 Jan 2023 03:45) (84 - 96)  BP: 113/75 (11 Jan 2023 03:45) (98/63 - 115/68)  BP(mean): 89 (11 Jan 2023 03:45) (76 - 91)  RR: 17 (11 Jan 2023 03:45) (17 - 18)  SpO2: 98% (10 Ladarius 2023 23:43) (98% - 99%)    Parameters below as of 11 Jan 2023 03:45  Patient On (Oxygen Delivery Method): room air    PHYSICAL EXAM:    Constitutional: A&Ox3, NAD    Respiratory: non labored breathing, no respiratory distress    Cardiovascular: NSR, RRR    Gastrointestinal: abdomen soft, NT/ND, incision c/d/i. Ileostomy w/ stoma pink and viable, gas and liquid stool in bag.     Extremities: WWP, normal strength    I&O's Detail    10 Ladarius 2023 07:01  -  11 Jan 2023 07:00  --------------------------------------------------------  IN:    Fat Emulsion (Fish Oil &amp; Plant Based) 20% Infusion: 99.6 mL    Lactated Ringers Bolus: 450 mL    Oral Fluid: 250 mL    TPN (Total Parenteral Nutrition): 900 mL  Total IN: 1699.6 mL    OUT:    Ileostomy (mL): 1350 mL    Voided (mL): 1375 mL  Total OUT: 2725 mL    Total NET: -1025.4 mL          LABS:                        10.9   11.32 )-----------( 432      ( 10 Ladarius 2023 13:00 )             35.1     01-10    136  |  103  |  13  ----------------------------<  119<H>  4.6   |  23  |  0.67    Ca    8.9      10 Ladarius 2023 13:00  Phos  2.9     01-10  Mg     2.0     01-10    TPro  7.1  /  Alb  2.9<L>  /  TBili  0.8  /  DBili  x   /  AST  69<H>  /  ALT  112<H>  /  AlkPhos  187<H>  01-10          RADIOLOGY & ADDITIONAL STUDIES:

## 2023-01-11 NOTE — PROGRESS NOTE ADULT - SUBJECTIVE AND OBJECTIVE BOX
SUBJECTIVE: Patient seen and examined bedside; no events overnight, HD stable, concerned about the look of his leg, tolerated dressing change.    aMIOdarone    Tablet 100 milliGRAM(s) Oral every 24 hours  enoxaparin Injectable 60 milliGRAM(s) SubCutaneous every 12 hours  metoprolol succinate ER 25 milliGRAM(s) Oral daily      Vital Signs Last 24 Hrs  T(C): 37.2 (11 Jan 2023 08:57), Max: 37.7 (10 Ladarius 2023 16:31)  T(F): 98.9 (11 Jan 2023 08:57), Max: 99.8 (10 Ladarius 2023 16:31)  HR: 98 (11 Jan 2023 11:29) (84 - 98)  BP: 111/61 (11 Jan 2023 11:29) (86/54 - 115/68)  BP(mean): 77 (11 Jan 2023 11:29) (64 - 90)  RR: 17 (11 Jan 2023 11:29) (17 - 18)  SpO2: 99% (11 Jan 2023 11:29) (98% - 99%)    Parameters below as of 11 Jan 2023 11:29  Patient On (Oxygen Delivery Method): room air      I&O's Detail    10 Ladarius 2023 07:01  -  11 Jan 2023 07:00  --------------------------------------------------------  IN:    Fat Emulsion (Fish Oil &amp; Plant Based) 20% Infusion: 99.6 mL    Lactated Ringers Bolus: 450 mL    Oral Fluid: 250 mL    TPN (Total Parenteral Nutrition): 900 mL  Total IN: 1699.6 mL    OUT:    Ileostomy (mL): 1750 mL    Voided (mL): 975 mL  Total OUT: 2725 mL    Total NET: -1025.4 mL      11 Jan 2023 07:01  -  11 Jan 2023 13:00  --------------------------------------------------------  IN:    Fat Emulsion (Fish Oil &amp; Plant Based) 20% Infusion: 33.2 mL    Oral Fluid: 250 mL    TPN (Total Parenteral Nutrition): 250 mL  Total IN: 533.2 mL    OUT:    Ileostomy (mL): 300 mL    Voided (mL): 250 mL  Total OUT: 550 mL    Total NET: -16.8 mL      PE:    General: NAD, resting comfortably in bed  C/V: S1 s2, RRR  Pulm: Nonlabored breathing, no respiratory distress  Abd: Soft, NTND  Extrem: L LE dry gangrene, no obvious changes from prior exam, dressed with betadine and kerlix        LABS:                        10.9   11.32 )-----------( 432      ( 10 Ladarius 2023 13:00 )             35.1     01-10    136  |  103  |  13  ----------------------------<  119<H>  4.6   |  23  |  0.67    Ca    8.9      10 Ladarius 2023 13:00  Phos  2.9     01-10  Mg     2.0     01-10    TPro  7.1  /  Alb  2.9<L>  /  TBili  0.8  /  DBili  x   /  AST  69<H>  /  ALT  112<H>  /  AlkPhos  187<H>  01-10          RADIOLOGY & ADDITIONAL STUDIES:

## 2023-01-11 NOTE — CHART NOTE - NSCHARTNOTEFT_GEN_A_CORE
The writer met with the patient for f/u individual psychotherapy to explore the patient's response to his current hospitalization in the context of his own identity. No S/H/I/I/P/A/V/H reported.

## 2023-01-11 NOTE — CHART NOTE - NSCHARTNOTEFT_GEN_A_CORE
Admitting Diagnosis:   Patient is a 77y old  Male who presents with a chief complaint of A flutter (2023 09:33)    PAST MEDICAL & SURGICAL HISTORY:    Current Nutrition Order:  Soft and bite sized diet with Ensure Enlive TID (1050 kcal, 60g protein, 540mL free H2O)  TPN: 1200 ml TV @ 50 ml/hr providing 165g Dex, 50g AA, 20g SMOF lipids to provide 961 kcal, 50g pro, GIR 1.79. RD to follow.     PO Intake: Good (%) [   ]  Fair (50-75%) [ x  ] Poor (<25%) [   ]- please see kcal count from  for     GI Issues:   WDL, last BM 1/10  Ileostomy (1750 ml/24hrs)  No n/v/d/c  No abd distention or discomfort noted    Pain:  No pain noted at this time    Skin Integrity:  Surgical incision, carla score 13  +1 pitting edema BL knee  No pressure ulcers noted    Labs:   01-10    136  |  103  |  13  ----------------------------<  119<H>  4.6   |  23  |  0.67    Ca    8.9      10 Ladarius 2023 13:00  Phos  2.9     01-10  Mg     2.0     01-10    TPro  7.1  /  Alb  2.9<L>  /  TBili  0.8  /  DBili  x   /  AST  69<H>  /  ALT  112<H>  /  AlkPhos  187<H>  01-10    Medications:  MEDICATIONS  (STANDING):  aMIOdarone    Tablet 100 milliGRAM(s) Oral every 24 hours  ascorbic acid 500 milliGRAM(s) Oral daily  chlorhexidine 2% Cloths 1 Application(s) Topical <User Schedule>  chlorhexidine 2% Cloths 1 Application(s) Topical <User Schedule>  dextrose 5%. 1000 milliLiter(s) (50 mL/Hr) IV Continuous <Continuous>  dextrose 5%. 1000 milliLiter(s) (100 mL/Hr) IV Continuous <Continuous>  diphenoxylate/atropine 2 Tablet(s) Oral three times a day  dronabinol 5 milliGRAM(s) Oral every 12 hours  enoxaparin Injectable 60 milliGRAM(s) SubCutaneous every 12 hours  glucagon  Injectable 1 milliGRAM(s) IntraMuscular once  influenza  Vaccine (HIGH DOSE) 0.7 milliLiter(s) IntraMuscular once  insulin lispro (ADMELOG) corrective regimen sliding scale   SubCutaneous every 6 hours  loperamide 4 milliGRAM(s) Oral three times a day  metoprolol succinate ER 25 milliGRAM(s) Oral daily  multivitamin 1 Tablet(s) Oral daily  opium Tincture 6 milliGRAM(s) Oral four times a day  pantoprazole    Tablet 40 milliGRAM(s) Oral two times a day  Parenteral Nutrition - Adult 1 Each (50 mL/Hr) TPN Continuous <Continuous>  psyllium Powder 1 Packet(s) Oral every 12 hours  silver sulfADIAZINE 1% Cream 1 Application(s) Topical daily  zinc sulfate 220 milliGRAM(s) Oral daily    MEDICATIONS  (PRN):  acetaminophen     Tablet .. 650 milliGRAM(s) Oral every 6 hours PRN Temp greater or equal to 38C (100.4F), Mild Pain (1 - 3)  dextrose Oral Gel 15 Gram(s) Oral once PRN Blood Glucose LESS THAN 70 milliGRAM(s)/deciliter  oxyCODONE    IR 5 milliGRAM(s) Oral every 4 hours PRN Severe Pain (7 - 10)  sodium chloride 0.9% lock flush 10 milliLiter(s) IV Push every 1 hour PRN Pre/post blood products, medications, blood draw, and to maintain line patency    Admitting Anthropometrics:    pounds +-10%  Wt 142 pounds BMI 16.4 %IBW=66%     Weight Change:    141.9 pounds - dosing wt    136 pounds - Bedscale wt     **PCM:  >> Reports having 1-2 meals/day + ONS. Per pt claims to have 2 ensure/day and 2 nutrament/day - unclear how accurate this is. ?If pt meeting ~75% EER consistently.  >> Does report wt loss.  pounds x6 months ago. Thinks he now is 135 pounds which is consistent with bedscale wt obtained during initial visit by  pounds. Suggest wt loss of 49 pounds/26% body wt loss.  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     Estimated energy needs:  Current body wt for EER based on clinician judgment. Adjust for age, malnutrition, EF, S/p OR, Pressure ulcer; fluids per team  25-30kcal/k-1950kcal/day   1.3-1.5gm/k-98gm prot/day   **Rec upper end of needs     Subjective:  77M with PMHx of IVDU found unresponsive at his nursing home, resolved after Narcan in the field and brought to [facility]. Found to have PE, atrial flutter, and large LV thrombus on echo. Transferred to [facility] for further management. Pt c/o abdominal pain on 12/10 CTA showing mid SMA with embolus. Abnormal distal small bowel loops and cecum with dilatation and pneumatosis suggesting infarcted bowel. One or two tiny foci of intrahepatic portal vein pneumatosis. Segmental infarction upper pole left kidney. Now s/p Ex lap, SMA embolectomy, 80cm SBR, abthera vac left in discontinuity () and transferred to SICU intubated. S/p second look () and most recently s/p OR for 3rd look, end-to-end anastomosis of remaining bowel, loop ileostomy and abdomen closure (12/15). Transferred to CCU on  for cardiac optimization and now transferred back to SICU  for bleeding hemodynamic instability. Echo  shows EF 10-15% with overall hypokinesis. CTA performed demonstrating large hematoma in R abdomen, new hemoperitoneum, and bilateral pleural effusions. Remains in SICU, now extubated with still with limb ischemia to LLE pending amputation and AC held given BRBPR and hematoma; noted pt agreeable for AKA when feasible - AKA currently Pending Cards. Pt s/p DCCV on  (negative LORENZO on ) with successful conversion to NSR. Planning for AKA with vascular surgery once nutrition status is optimize. Team placed PICC 1/10 and initiated supplemental TPN.     On assessment     Prior PES: Malnutrition Severe in Chronic state RT presumed intake<EER AEB NFPE, wt loss, PO intake  >>on going  Goal: Pt will meet at least 75% of nutrient needs via feasible route / Show no further s/s of malnutrition    Recommendations:  1. Regular diet High Fiber diet with Ensure Enlive TID (1050 kcal, 60g protein, 540mL free H2O)  >> Recommend transition to Soft and bite sized Low fiber diet with cont supplementation of soluble fiber to aid with forming stool.   2. Cont with supplemental TPN to meet 50% of upper end of est needs; 1200 ml TV @ 50 ml/hr providing 165g Dex, 50g AA, 20g SMOF lipids to provide 961 kcal, 50g pro, GIR 1.79. RD to follow.  >> Cont to trend TG weekly  3. Monitor Skin, Wts daily, GOC. Pain/GI per team.   >>Cont with imodium and metamucil per team  4. Labs: monitor BMP, CBC, glucose, lytes - Replete PRN, trend renal indices, LFts, POCT.  5. RD to remain available for additional Recs.      Education:      Risk Level: High [X   ] Moderate [   ] Low [   ]. Admitting Diagnosis:   Patient is a 77y old  Male who presents with a chief complaint of A flutter (2023 09:33)    PAST MEDICAL & SURGICAL HISTORY:  PMHx of IVDU     Current Nutrition Order:  Soft and bite sized high fiber diet with x2 Ensure Enlive TID (2100 kcal, 120g protein, 1080mL free H2O)  TPN: 1200 ml TV @ 50 ml/hr providing 165g Dex, 50g AA, 20g SMOF lipids to provide 961 kcal, 50g pro, GIR 1.79. RD to follow.     PO Intake: Good (%) [   ]  Fair (50-75%) [ x  ] Poor (<25%) [   ]- please see kcal count from  for full assessment of PO intake.     GI Issues:   WDL, last BM 1/10  Ileostomy (1750 ml/24hrs)  No n/v/d/c  No abd distention or discomfort noted    Pain:  No pain noted at this time    Skin Integrity:  Surgical incision, carla score 13  +1 pitting edema BL knee  No pressure ulcers noted    Labs:   01-10    136  |  103  |  13  ----------------------------<  119<H>  4.6   |  23  |  0.67    Ca    8.9      10 Ladarius 2023 13:00  Phos  2.9     01-10  Mg     2.0     01-10    TPro  7.1  /  Alb  2.9<L>  /  TBili  0.8  /  DBili  x   /  AST  69<H>  /  ALT  112<H>  /  AlkPhos  187<H>  01-10    Medications:  MEDICATIONS  (STANDING):  aMIOdarone    Tablet 100 milliGRAM(s) Oral every 24 hours  ascorbic acid 500 milliGRAM(s) Oral daily  chlorhexidine 2% Cloths 1 Application(s) Topical <User Schedule>  chlorhexidine 2% Cloths 1 Application(s) Topical <User Schedule>  dextrose 5%. 1000 milliLiter(s) (50 mL/Hr) IV Continuous <Continuous>  dextrose 5%. 1000 milliLiter(s) (100 mL/Hr) IV Continuous <Continuous>  diphenoxylate/atropine 2 Tablet(s) Oral three times a day  dronabinol 5 milliGRAM(s) Oral every 12 hours  enoxaparin Injectable 60 milliGRAM(s) SubCutaneous every 12 hours  glucagon  Injectable 1 milliGRAM(s) IntraMuscular once  influenza  Vaccine (HIGH DOSE) 0.7 milliLiter(s) IntraMuscular once  insulin lispro (ADMELOG) corrective regimen sliding scale   SubCutaneous every 6 hours  loperamide 4 milliGRAM(s) Oral three times a day  metoprolol succinate ER 25 milliGRAM(s) Oral daily  multivitamin 1 Tablet(s) Oral daily  opium Tincture 6 milliGRAM(s) Oral four times a day  pantoprazole    Tablet 40 milliGRAM(s) Oral two times a day  Parenteral Nutrition - Adult 1 Each (50 mL/Hr) TPN Continuous <Continuous>  psyllium Powder 1 Packet(s) Oral every 12 hours  silver sulfADIAZINE 1% Cream 1 Application(s) Topical daily  zinc sulfate 220 milliGRAM(s) Oral daily    MEDICATIONS  (PRN):  acetaminophen     Tablet .. 650 milliGRAM(s) Oral every 6 hours PRN Temp greater or equal to 38C (100.4F), Mild Pain (1 - 3)  dextrose Oral Gel 15 Gram(s) Oral once PRN Blood Glucose LESS THAN 70 milliGRAM(s)/deciliter  oxyCODONE    IR 5 milliGRAM(s) Oral every 4 hours PRN Severe Pain (7 - 10)  sodium chloride 0.9% lock flush 10 milliLiter(s) IV Push every 1 hour PRN Pre/post blood products, medications, blood draw, and to maintain line patency    Admitting Anthropometrics:    pounds +-10%  Wt 142 pounds BMI 16.4 %IBW=66%     Weight Change:    141.9 pounds - dosing wt    136 pounds - Bedscale wt     **PCM:  >> Reports having 1-2 meals/day + ONS. Per pt claims to have 2 ensure/day and 2 nutrament/day - unclear how accurate this is. ?If pt meeting ~75% EER consistently.  >> Does report wt loss.  pounds x6 months ago. Thinks he now is 135 pounds which is consistent with bedscale wt obtained during initial visit by  pounds. Suggest wt loss of 49 pounds/26% body wt loss.  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     Estimated energy needs:  Current body wt for EER based on clinician judgment. Adjust for age, malnutrition, EF, S/p OR, Pressure ulcer; fluids per team  25-30kcal/k-1950kcal/day   1.3-1.5gm/k-98gm prot/day   **Rec upper end of needs     Subjective:  77M with PMHx of IVDU found unresponsive at his nursing home, resolved after Narcan in the field and brought to [facility]. Found to have PE, atrial flutter, and large LV thrombus on echo. Transferred to [facility] for further management. Pt c/o abdominal pain on 12/10 CTA showing mid SMA with embolus. Abnormal distal small bowel loops and cecum with dilatation and pneumatosis suggesting infarcted bowel. One or two tiny foci of intrahepatic portal vein pneumatosis. Segmental infarction upper pole left kidney. Now s/p Ex lap, SMA embolectomy, 80cm SBR, abthera vac left in discontinuity () and transferred to SICU intubated. S/p second look () and most recently s/p OR for 3rd look, end-to-end anastomosis of remaining bowel, loop ileostomy and abdomen closure (12/15). Transferred to CCU on  for cardiac optimization and now transferred back to SICU  for bleeding hemodynamic instability. Echo  shows EF 10-15% with overall hypokinesis. CTA performed demonstrating large hematoma in R abdomen, new hemoperitoneum, and bilateral pleural effusions. Remains in SICU, now extubated with still with limb ischemia to LLE pending amputation and AC held given BRBPR and hematoma; noted pt agreeable for AKA when feasible - AKA currently Pending Cards. Pt s/p DCCV on  (negative LORENZO on ) with successful conversion to NSR. Planning for AKA with vascular surgery once nutrition status is optimize. Team placed PICC 1/10 and initiated supplemental TPN.     On assessment, pt seen resting in bed. Currently on soft and bite sized high fiber diet with x2 Ensure Enlive TID (2100 kcal, 120g protein, 1080mL free H2O) with supplemental TPN meeting ~50% of est needs. Pt consuming~50% of est needs with x2-3 ensures daily- cont to provide pt with foods he enjoys/ have family bring in food for pt. RD cont to encourage adequate PO intake as tolerated for pre-op nutrition optimization. RD to follow.     Prior PES: Malnutrition Severe in Chronic state RT presumed intake<EER AEB NFPE, wt loss, PO intake  >>on going  Goal: Pt will meet at least 75% of nutrient needs via feasible route / Show no further s/s of malnutrition    Recommendations:  1. Soft and bite sized High Fiber diet with Ensure Enlive TID (1050 kcal, 60g protein, 540mL free H2O)  >> Recommend transition to Soft and bite sized Low fiber diet with cont supplementation of soluble fiber to aid with forming stool.   2. Cont with supplemental TPN to meet 50% of upper end of est needs; 1200 ml TV @ 50 ml/hr providing 165g Dex, 50g AA, 20g SMOF lipids to provide 961 kcal, 50g pro, GIR 1.79. RD to follow.  >> Cont to trend TG weekly  3. Monitor Skin, Wts daily, GOC. Pain/GI per team.   >>Cont with imodium and metamucil per team  4. Labs: monitor BMP, CBC, glucose, lytes - Replete PRN, trend renal indices, LFts, POCT.  5. RD to remain available for additional Recs.      Education:  Cont to encourage adequate PO intake as tolerated for pre-op nutrition optimization.     Risk Level: High [X   ] Moderate [   ] Low [   ].

## 2023-01-11 NOTE — PROGRESS NOTE ADULT - ASSESSMENT
77M with PMHx of IVDU found unresponsive at his nursing home, resolved after Narcan in the field and brought to [facility]. Found to have PE, atrial flutter, and large LV thrombus on echo. Transferred to [facility] for further management. Pt c/o abdominal pain on 12/10 CTA showing mid SMA with embolus. Abnormal distal small bowel loops and cecum with dilatation and pneumatosis suggesting infarcted bowel. One or two tiny foci of intrahepatic portal vein pneumatosis. Segmental infarction upper pole left kidney. Now s/p Ex lap, SMA embolectomy, 80cm SBR, abthera vac left in discontinuity (12/11) and transferred to SICU intubated. S/p second look (12/13) and most recently s/p OR for 3rd look, end-to-end anastomosis of remaining bowel, loop ileostomy and abdomen closure (12/15). Remains in SICU, now extubated with still with limb ischemia to LLE pending amputation. Now stepped down to tele.     Reg/High fiber diet + ensures, TPN  Pain/nausea control  Imodium, metamucin, tincture of opium for high ileostomy output  A flutter now s/p cardioversion, amio 100 QD, toprolol XL 25 QD  LV thrombus w LLE limb ischemia- Heparin gtt changed to SQL BID  Planning for AKA with vascular surgery  AM labs

## 2023-01-12 NOTE — PROGRESS NOTE ADULT - SUBJECTIVE AND OBJECTIVE BOX
Patient was seen and examined at bedside. Case discuss with resident. Pt reports that he did not sleep well. Pt denied having any pain.     OBJECTIVE:  Vital Signs Last 24 Hrs  T(C): 37.2 (12 Jan 2023 09:26), Max: 37.4 (12 Jan 2023 04:43)  T(F): 98.9 (12 Jan 2023 09:26), Max: 99.4 (12 Jan 2023 04:43)  HR: 90 (12 Jan 2023 08:31) (90 - 98)  BP: 115/83 (12 Jan 2023 08:31) (111/61 - 140/80)  BP(mean): 93 (12 Jan 2023 08:31) (77 - 103)  RR: 17 (12 Jan 2023 08:31) (17 - 20)  SpO2: 100% (12 Jan 2023 08:31) (97% - 100%)    Parameters below as of 12 Jan 2023 08:31  Patient On (Oxygen Delivery Method): room air      PHYSICAL EXAM:  Gen: NAD laying in bed; pt being observed   CV: RRR, +S1/S2, no mumur  Pulm: CTA b/l no wheezing or crackles   Abd: soft, NTND + BS no rebound or guarding   Left leg bandage C/D/I       LABS:                        11.0   11.26 )-----------( 411      ( 12 Jan 2023 07:02 )             33.8     01-12    130<L>  |  99  |  15  ----------------------------<  106<H>  4.6   |  26  |  0.61    Ca    8.6      12 Jan 2023 07:02  Phos  3.2     01-12  Mg     2.0     01-12    TPro  7.6  /  Alb  3.0<L>  /  TBili  0.6  /  DBili  0.2  /  AST  42<H>  /  ALT  75<H>  /  AlkPhos  174<H>  01-12    LIVER FUNCTIONS - ( 12 Jan 2023 07:02 )  Alb: 3.0 g/dL / Pro: 7.6 g/dL / ALK PHOS: 174 U/L / ALT: 75 U/L / AST: 42 U/L / GGT: x           MEDICATIONS:   acetaminophen     Tablet .. 650 milliGRAM(s) Oral every 6 hours PRN  aMIOdarone    Tablet 100 milliGRAM(s) Oral every 24 hours  ascorbic acid 500 milliGRAM(s) Oral daily  chlorhexidine 2% Cloths 1 Application(s) Topical <User Schedule>  chlorhexidine 2% Cloths 1 Application(s) Topical <User Schedule>  diphenoxylate/atropine 2 Tablet(s) Oral three times a day  dronabinol 5 milliGRAM(s) Oral every 12 hours  enoxaparin Injectable 60 milliGRAM(s) SubCutaneous every 12 hours  fat emulsion (Fish Oil and Plant Based) 20% Infusion 0.7764 Gm/kG/Day IV Continuous <Continuous>  glucagon  Injectable 1 milliGRAM(s) IntraMuscular once  influenza  Vaccine (HIGH DOSE) 0.7 milliLiter(s) IntraMuscular once  insulin lispro (ADMELOG) corrective regimen sliding scale   SubCutaneous every 6 hours  loperamide 4 milliGRAM(s) Oral three times a day  metoprolol succinate ER 25 milliGRAM(s) Oral daily  multivitamin 1 Tablet(s) Oral daily  opium Tincture 6 milliGRAM(s) Oral four times a day  oxyCODONE    IR 5 milliGRAM(s) Oral every 4 hours PRN  pantoprazole    Tablet 40 milliGRAM(s) Oral two times a day  Parenteral Nutrition - Adult 1 Each TPN Continuous <Continuous>  Parenteral Nutrition - Adult 1 Each TPN Continuous <Continuous>  psyllium Powder 1 Packet(s) Oral every 12 hours  silver sulfADIAZINE 1% Cream 1 Application(s) Topical daily  sodium chloride 0.9% lock flush 10 milliLiter(s) IV Push every 1 hour PRN  zinc sulfate 220 milliGRAM(s) Oral daily      A/P: 77 year old man who first presented to [facility] from [facility] due to unresponsiveness (responded to Narcan). At [facility], found to have subsegmental pulmonary embolism in RUL, rapid atrial flutter with severely reduced LVEF with LV thrombus. Transferred to [facility] on 12/7/22 for LORENZO and possible ablation. At [facility], was found to have left leg ischemia (left leg arterial thrombus on LE duplex on 12/8). Was being considered for rhythm control when he developed abdominal pain, CTA (12/10/22) showed embolus in SMA, bowel infarct, requiring ex-lap on 12/11 with SMA embolectomy, 80cm small bowel resection; On 12/13, underwent 2nd look laparotomy, with 80cm small bowel resection, closure with abthera. On 12/15, underwent 3rd look laparotomy, end-to-end anastomosis of remaining bowel, loop ileostomy and abdominal closure. Now extubated, on tele floor. Eventually underwent LORENZO/DCCV on 12/30/22, now in sinus rhythm. Currently being evaluated for possible above knee amputation for LLE ischemia (possibly deferring till next hospitalization, after optimization of nutritional status)      # Left leg ischemia - Decision re: timing of Above Knee Amputation per vascular surgery and primary team. Optimizing nutritional status prior to surgery       #Transaminitis   - Amiodarone dose decreased  to 100mg qd given age, low BMI (BMI 16) , frailty, and uptrending liver tests   - Will continue to trend   -Hepatology following who recommended checking  Hep A ab and Hep B surface Ag, surface Ab and Core Ab  -Add CK and GGT to help elucidate etiology of Alk phos in setting of ongoing limb ischemia    # Acute Systolic Heart Failure   - Pt is currently being Followed by heart failure team  - Continue metoprolol succinate 25mg qd     # Atrial Flutter   - EP following, now s/p DCCV 12/30. EP recommending uninterrupted AC x 30 days ; Pt is on enoxaparin 60mg SQ q12). Pt is on  amiodarone 100mg qd, metoprolol succinate 25 qd . Currently in sinus rhythm     # Pulmonary embolism (subsegmental RUL)   - After completion of AC for DCCV, would need to continue AC for PE. On lovenox full dose.    # Bowel ischemia - s/p SMA embolectomy; Small bowel resection 80 cm + 40 cm; ileostomy in place; mgmt per primary team   - Monitor ileostomy output on loperamide 4mg TID, tincture of opium QID       # Acute blood loss anemia - Hgb downtrended from time of admission (Hgb 14 --> ~11); partially attributable to operative blood losses (underwent laparotomy x 3)    # Hyponatremia   -Pt's Na 131->130   - Will check  serum osmol, urine osmol, urine Na     # Severe protein-calorie malnutrition   - agree with nutrition service's assessment ; supplemental nutrition per nutrition recs   - Getting dronabinol 5mg q12 hrs     # Sacral wound - continue off loading, dressings    #Sleep disturbance -  trial of melatonin 1mg qHS PRN       #GI PPX  #DVT PPX   - Pt is on pantoprazole 40mg PO BID   -Pt is on full dose AC with lovenox     #DISPO  - As per primary team

## 2023-01-12 NOTE — BH CONSULTATION LIAISON PROGRESS NOTE - NSBHASSESSMENTFT_PSY_ALL_CORE
Pt is a 77 year old male with history of substance use and no significant past medical history (poor medical follow up, last PCP visit 20 years prior to admission), presenting with unresponsiveness from nursing home to [facility], found to be in Aflutter w RVR with subsegmental PE RUL, transferred to [facility], found to have LV thrombus and worsening HF, EF 10-15%. 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 Course further complicated by LLE pulselessness and ulcerations that will ultimately require BKA. Psychiatry was consulted to evaluate for depression. Patient currently reports low mood in context of coping with her current medical disorders, pain and anticipation to loss of independence after BKA. He denies active SI and presents future oriented thinking. He is in agreement to see psychology for support while admitted to [facility] and starting a psychotropic to help with his current symptoms .     Plan:  -Start Mirtazapine 15mg po qhs for insomnia and depression  -Psychology follow up for supportive psychotherapy   Pt is a 77 year old male with history of substance use and no significant past medical history (poor medical follow up, last PCP visit 20 years prior to admission), presenting with unresponsiveness from nursing home to [facility], found to be in Aflutter w RVR with subsegmental PE RUL, transferred to [facility], found to have LV thrombus and worsening HF, EF 10-15%. 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 Course further complicated by LLE pulselessness and ulcerations that will ultimately require BKA. Psychiatry was consulted to evaluate for depression. Patient currently reports low mood in context of coping with her current medical disorders, pain and anticipation to loss of independence with his current medical complications. He acknowledges passive thoughts of death and does not verbalize any active SI. He is in agreement to see psychology for support while admitted to [facility] and starting a psychotropic to help with his current symptoms.    Plan:  -1:1 observation and re-assessment of suicide risk tomorrow morning.  -Start Mirtazapine 15mg po qhs for insomnia and depression  -Psychology follow up for supportive psychotherapy  -CL to follow

## 2023-01-12 NOTE — PROGRESS NOTE ADULT - ASSESSMENT
77M with PMHx of IVDU found unresponsive at his nursing home, resolved after Narcan in the field and brought to [facility]. Found to have PE, atrial flutter, and large LV thrombus on echo. Transferred to [facility] for further management. Pt c/o abdominal pain on 12/10 CTA showing mid SMA with embolus. Abnormal distal small bowel loops and cecum with dilatation and pneumatosis suggesting infarcted bowel. One or two tiny foci of intrahepatic portal vein pneumatosis. Segmental infarction upper pole left kidney. Now s/p Ex lap, SMA embolectomy, 80cm SBR, abthera vac left in discontinuity (12/11) and transferred to SICU intubated. S/p second look (12/13) and most recently s/p OR for 3rd look, end-to-end anastomosis of remaining bowel, loop ileostomy and abdomen closure (12/15). Remains in SICU, now extubated with still with limb ischemia to LLE pending amputation. Now stepped down to tele.     Reg/low fiber diet + ensures, TPN  Pain/nausea control  Imodium, metamucin, tincture of opium for high ileostomy output  A flutter now s/p cardioversion, amio 100 QD, toprolol XL 25 QD  LV thrombus w LLE limb ischemia- Heparin gtt changed to SQL BID  Planning for AKA with vascular surgery  AM labs  encouraged PO intake, nutrition optimization

## 2023-01-12 NOTE — PROGRESS NOTE ADULT - ATTENDING COMMENTS
Liver enzymes elevation   Multifactorial   Stable  Drug induced liver injury can not be entirely ruled out.   No contraindication for current medical therapy

## 2023-01-12 NOTE — CHART NOTE - NSCHARTNOTEFT_GEN_A_CORE
C/L psychology intern met with pt for 30-minute individual psychotherapy session. Pt reported mood is "the same, not any better and not any worse." Pt's affect was angry and agitated, particularly around feeling "disrespected" by the staff and his family, specifically in the context of being fed food he finds inedible. Session focused on feelings of anger and a sense of lack of control around his situation. He reported poor sleep, and continued lower back pain. He reported trying to find ways to move his focus away from his back pain, and writer offered practicing mindfulness exercise with pt next week, to which pt agreed. Pt is on 1:1, which pt understands as "suicide watch." Pt denied SI. He continues to express homicidal thoughts (e.g., wanting to put a gun to people's head and making them eat the food they're feeding him here in the hospital). When asked more specifically, pt's anger and HI is generalized; he did not identify anyone in particular on which he is focusing his HI. At the end of session, pt began to eat some of his lunch, was still in a somewhat agitated but overall stable mood.

## 2023-01-12 NOTE — PROGRESS NOTE ADULT - ASSESSMENT
77M with PMHx of IVDU found unresponsive at his nursing home, resolved after Narcan in the field and brought to [facility]. Found to have PE, atrial flutter, and large LV thrombus on echo. Transferred to [facility] for further management with complicated inpt source, summarized as follows    -Abdominal pain on 12/10 CTA showing mid SMA with embolus. Abnormal distal small bowel loops and cecum with dilatation and pneumatosis suggesting infarcted bowel. One or two tiny foci of intrahepatic portal vein pneumatosis. Segmental infarction upper pole left kidney.  -S/p Ex lap, SMA embolectomy, 80cm SBR, abthera vac left in discontinuity (12/11) and transferred to SICU intubated.   -S/p second look (12/13) and most recently s/p OR for 3rd look, end-to-end anastomosis of remaining bowel, loop ileostomy and abdomen closure (12/15)  -Continued  limb ischemia to LLE pending amputation    GI consulted for uptrending Alk phos/ALT/AST    #Elevated liver enzymes:  Normal bili.  Uptrend noted since Jan 1 when liver enzymes nml.  No abd pain.  Downtrend noted today  Med rec reviewed without clear offending etiology to explain DILI. CT scan reviewed, with some interval improvement of hemiabdomen hematoma, though no findings to explain liver enzyme abnormality which is somewhat improved today.  PV patent without thrombus.    Suspect hepatocellular liver enzyme elevation multifactorial, and due to multimorbid and ongoing illness. Imaging reassuring against acute hepatobiliary process to explain findings.     Hep C ab notably positive, though RNA negative    Recommendations:  - Agree with amio dose adjustment, though unlikely causing recent elevation in liver enzymes.  -Please check Hep A ab and Hep B surface Ag, surface Ab and Core Ab  -Add CK and GGT to help elucidate etiology of Alk phos in setting of ongoing limb ischemia  ---If CK elevated with nml GGT, muscle breakdown more likely explains elevation in enzymes  ---If both elevated, multifactorial process suggested, to include ongoing limb ischemia and multimorbid illness as noted above  ---If only GGT elevated likely due to cholestasis 2/2 underlying protracted disease state. May also consider underlying infection        driving this  -Trend daily CBC, CMP, coags    Hepatology service will sign off  Please recall as needed with any GI related questions or with unexplained and continued uptrend of enzymes/new abd pain    Thank you for this consult.     Eugenio Rausa, DO  Gastroenterology Fellow  Pager: [phone]   77M with PMHx of IVDU found unresponsive at his nursing home, resolved after Narcan in the field and brought to [facility]. Found to have PE, atrial flutter, and large LV thrombus on echo. Transferred to [facility] for further management with complicated inpt source, summarized as follows    -Abdominal pain on 12/10 CTA showing mid SMA with embolus. Abnormal distal small bowel loops and cecum with dilatation and pneumatosis suggesting infarcted bowel. One or two tiny foci of intrahepatic portal vein pneumatosis. Segmental infarction upper pole left kidney.  -S/p Ex lap, SMA embolectomy, 80cm SBR, abthera vac left in discontinuity (12/11) and transferred to SICU intubated.   -S/p second look (12/13) and most recently s/p OR for 3rd look, end-to-end anastomosis of remaining bowel, loop ileostomy and abdomen closure (12/15)  -Continued  limb ischemia to LLE pending amputation    GI consulted for uptrending Alk phos/ALT/AST    #Elevated liver enzymes:  Normal bili.  Uptrend noted since Jan 1 when liver enzymes nml.  No abd pain.  Downtrend noted today  Med rec reviewed without clear offending etiology to explain DILI. CT scan reviewed, with some interval improvement of hemiabdomen hematoma, though no findings to explain liver enzyme abnormality which is somewhat improved today.  PV patent without thrombus.    Suspect hepatocellular liver enzyme elevation multifactorial, and due to multimorbid and ongoing illness. Imaging reassuring against acute hepatobiliary process to explain findings.  TPN may also be contributory.    Hep C ab notably positive, though RNA negative    Recommendations:  - Agree with amio dose adjustment, though unlikely causing recent elevation in liver enzymes.  - May require TPN forumlation adjustment should liver enzymes remain elevated/uptrend  -Please check Hep A ab and Hep B surface Ag, surface Ab and Core Ab  -Add CK and GGT to help elucidate etiology of Alk phos in setting of ongoing limb ischemia  ---If CK elevated with nml GGT, muscle breakdown more likely explains elevation in enzymes  ---If both elevated, multifactorial process suggested, to include ongoing limb ischemia and multimorbid illness as noted above  ---If only GGT elevated likely due to cholestasis 2/2 underlying protracted disease state. May also be due to TPN. May also consider underlying infection driving this  -Trend daily CBC, CMP, coags    Hepatology service will sign off  Please recall as needed with any GI related questions or with unexplained and continued uptrend of enzymes/new abd pain    Thank you for this consult.     Eugenio Meadows DO  Gastroenterology Fellow  Pager: [phone]

## 2023-01-12 NOTE — PROGRESS NOTE ADULT - SUBJECTIVE AND OBJECTIVE BOX
**NOTE PENDING**    Allergies    No Known Allergies    Intolerances        MEDICATIONS:  MEDICATIONS  (STANDING):  aMIOdarone    Tablet 100 milliGRAM(s) Oral every 24 hours  ascorbic acid 500 milliGRAM(s) Oral daily  chlorhexidine 2% Cloths 1 Application(s) Topical <User Schedule>  chlorhexidine 2% Cloths 1 Application(s) Topical <User Schedule>  dextrose 5%. 1000 milliLiter(s) (50 mL/Hr) IV Continuous <Continuous>  dextrose 5%. 1000 milliLiter(s) (100 mL/Hr) IV Continuous <Continuous>  diphenoxylate/atropine 2 Tablet(s) Oral three times a day  dronabinol 5 milliGRAM(s) Oral every 12 hours  enoxaparin Injectable 60 milliGRAM(s) SubCutaneous every 12 hours  fat emulsion (Fish Oil and Plant Based) 20% Infusion 0.31 Gm/kG/Day (8.33 mL/Hr) IV Continuous <Continuous>  glucagon  Injectable 1 milliGRAM(s) IntraMuscular once  influenza  Vaccine (HIGH DOSE) 0.7 milliLiter(s) IntraMuscular once  insulin lispro (ADMELOG) corrective regimen sliding scale   SubCutaneous every 6 hours  lactated ringers Bolus 500 milliLiter(s) IV Bolus once  loperamide 4 milliGRAM(s) Oral three times a day  metoprolol succinate ER 25 milliGRAM(s) Oral daily  multivitamin 1 Tablet(s) Oral daily  opium Tincture 6 milliGRAM(s) Oral four times a day  pantoprazole    Tablet 40 milliGRAM(s) Oral two times a day  Parenteral Nutrition - Adult 1 Each (50 mL/Hr) TPN Continuous <Continuous>  psyllium Powder 1 Packet(s) Oral every 12 hours  silver sulfADIAZINE 1% Cream 1 Application(s) Topical daily  zinc sulfate 220 milliGRAM(s) Oral daily    MEDICATIONS  (PRN):  acetaminophen     Tablet .. 650 milliGRAM(s) Oral every 6 hours PRN Temp greater or equal to 38C (100.4F), Mild Pain (1 - 3)  dextrose Oral Gel 15 Gram(s) Oral once PRN Blood Glucose LESS THAN 70 milliGRAM(s)/deciliter  oxyCODONE    IR 5 milliGRAM(s) Oral every 4 hours PRN Severe Pain (7 - 10)  sodium chloride 0.9% lock flush 10 milliLiter(s) IV Push every 1 hour PRN Pre/post blood products, medications, blood draw, and to maintain line patency      Vital Signs Last 24 Hrs  T(C): 37.4 (12 Jan 2023 04:43), Max: 37.4 (12 Jan 2023 04:43)  T(F): 99.4 (12 Jan 2023 04:43), Max: 99.4 (12 Jan 2023 04:43)  HR: 90 (12 Jan 2023 04:06) (90 - 98)  BP: 140/80 (12 Jan 2023 04:06) (86/54 - 140/80)  BP(mean): 103 (12 Jan 2023 04:06) (64 - 103)  RR: 20 (12 Jan 2023 04:06) (17 - 20)  SpO2: 100% (12 Jan 2023 04:06) (97% - 100%)    Parameters below as of 12 Jan 2023 00:03  Patient On (Oxygen Delivery Method): room air        01-11 @ 07:01  -  01-12 @ 07:00  --------------------------------------------------------  IN: 2060.5 mL / OUT: 2450 mL / NET: -389.5 mL    PHYSICAL EXAM:  General: NAD  Respiratory: non labored breathing, no respiratory distress  Gastrointestinal: abdomen soft, non tender/non distended, incision c/d/i. Ileostomy w/ stoma pink tissue. Brown stool without blood.    LABS:  CBC Full  -  ( 12 Jan 2023 07:02 )  WBC Count : 11.26 K/uL  RBC Count : 3.88 M/uL  Hemoglobin : 11.0 g/dL  Hematocrit : 33.8 %  Platelet Count - Automated : 411 K/uL  Mean Cell Volume : 87.1 fl  Mean Cell Hemoglobin : 28.4 pg  Mean Cell Hemoglobin Concentration : 32.5 gm/dL  Auto Neutrophil # : x  Auto Lymphocyte # : x  Auto Monocyte # : x  Auto Eosinophil # : x  Auto Basophil # : x  Auto Neutrophil % : x  Auto Lymphocyte % : x  Auto Monocyte % : x  Auto Eosinophil % : x  Auto Basophil % : x    01-12    130<L>  |  99  |  15  ----------------------------<  106<H>  4.6   |  26  |  0.61    Ca    8.6      12 Jan 2023 07:02  Phos  3.2     01-12  Mg     2.0     01-12    TPro  7.6  /  Alb  3.0<L>  /  TBili  0.6  /  DBili  0.2  /  AST  42<H>  /  ALT  75<H>  /  AlkPhos  174<H>  01-12        RADIOLOGY & ADDITIONAL STUDIES:  Reviewed Pt seen at bedside.  Resting comfortably.  No abd pain, nausea/vomiting.  Afebrile overnight    Allergies    No Known Allergies    Intolerances        MEDICATIONS:  MEDICATIONS  (STANDING):  aMIOdarone    Tablet 100 milliGRAM(s) Oral every 24 hours  ascorbic acid 500 milliGRAM(s) Oral daily  chlorhexidine 2% Cloths 1 Application(s) Topical <User Schedule>  chlorhexidine 2% Cloths 1 Application(s) Topical <User Schedule>  dextrose 5%. 1000 milliLiter(s) (50 mL/Hr) IV Continuous <Continuous>  dextrose 5%. 1000 milliLiter(s) (100 mL/Hr) IV Continuous <Continuous>  diphenoxylate/atropine 2 Tablet(s) Oral three times a day  dronabinol 5 milliGRAM(s) Oral every 12 hours  enoxaparin Injectable 60 milliGRAM(s) SubCutaneous every 12 hours  fat emulsion (Fish Oil and Plant Based) 20% Infusion 0.31 Gm/kG/Day (8.33 mL/Hr) IV Continuous <Continuous>  glucagon  Injectable 1 milliGRAM(s) IntraMuscular once  influenza  Vaccine (HIGH DOSE) 0.7 milliLiter(s) IntraMuscular once  insulin lispro (ADMELOG) corrective regimen sliding scale   SubCutaneous every 6 hours  lactated ringers Bolus 500 milliLiter(s) IV Bolus once  loperamide 4 milliGRAM(s) Oral three times a day  metoprolol succinate ER 25 milliGRAM(s) Oral daily  multivitamin 1 Tablet(s) Oral daily  opium Tincture 6 milliGRAM(s) Oral four times a day  pantoprazole    Tablet 40 milliGRAM(s) Oral two times a day  Parenteral Nutrition - Adult 1 Each (50 mL/Hr) TPN Continuous <Continuous>  psyllium Powder 1 Packet(s) Oral every 12 hours  silver sulfADIAZINE 1% Cream 1 Application(s) Topical daily  zinc sulfate 220 milliGRAM(s) Oral daily    MEDICATIONS  (PRN):  acetaminophen     Tablet .. 650 milliGRAM(s) Oral every 6 hours PRN Temp greater or equal to 38C (100.4F), Mild Pain (1 - 3)  dextrose Oral Gel 15 Gram(s) Oral once PRN Blood Glucose LESS THAN 70 milliGRAM(s)/deciliter  oxyCODONE    IR 5 milliGRAM(s) Oral every 4 hours PRN Severe Pain (7 - 10)  sodium chloride 0.9% lock flush 10 milliLiter(s) IV Push every 1 hour PRN Pre/post blood products, medications, blood draw, and to maintain line patency      Vital Signs Last 24 Hrs  T(C): 37.4 (12 Jan 2023 04:43), Max: 37.4 (12 Jan 2023 04:43)  T(F): 99.4 (12 Jan 2023 04:43), Max: 99.4 (12 Jan 2023 04:43)  HR: 90 (12 Jan 2023 04:06) (90 - 98)  BP: 140/80 (12 Jan 2023 04:06) (86/54 - 140/80)  BP(mean): 103 (12 Jan 2023 04:06) (64 - 103)  RR: 20 (12 Jan 2023 04:06) (17 - 20)  SpO2: 100% (12 Jan 2023 04:06) (97% - 100%)    Parameters below as of 12 Jan 2023 00:03  Patient On (Oxygen Delivery Method): room air        01-11 @ 07:01  -  01-12 @ 07:00  --------------------------------------------------------  IN: 2060.5 mL / OUT: 2450 mL / NET: -389.5 mL    PHYSICAL EXAM:  General: NAD, thin appearing  Respiratory: non labored breathing, no respiratory distress  Gastrointestinal: abdomen soft, non tender/non distended, incision c/d/i. Ileostomy w/ stoma pink tissue. Brown stool without blood.    LABS:  CBC Full  -  ( 12 Jan 2023 07:02 )  WBC Count : 11.26 K/uL  RBC Count : 3.88 M/uL  Hemoglobin : 11.0 g/dL  Hematocrit : 33.8 %  Platelet Count - Automated : 411 K/uL  Mean Cell Volume : 87.1 fl  Mean Cell Hemoglobin : 28.4 pg  Mean Cell Hemoglobin Concentration : 32.5 gm/dL  Auto Neutrophil # : x  Auto Lymphocyte # : x  Auto Monocyte # : x  Auto Eosinophil # : x  Auto Basophil # : x  Auto Neutrophil % : x  Auto Lymphocyte % : x  Auto Monocyte % : x  Auto Eosinophil % : x  Auto Basophil % : x    01-12    130<L>  |  99  |  15  ----------------------------<  106<H>  4.6   |  26  |  0.61    Ca    8.6      12 Jan 2023 07:02  Phos  3.2     01-12  Mg     2.0     01-12    TPro  7.6  /  Alb  3.0<L>  /  TBili  0.6  /  DBili  0.2  /  AST  42<H>  /  ALT  75<H>  /  AlkPhos  174<H>  01-12        RADIOLOGY & ADDITIONAL STUDIES:  Reviewed

## 2023-01-12 NOTE — PROGRESS NOTE ADULT - SUBJECTIVE AND OBJECTIVE BOX
SUBJECTIVE: Patient seen and examined bedside; no events overnight, patient sleepy this am, no complaints during dressing change, asking for breakfast.    aMIOdarone    Tablet 100 milliGRAM(s) Oral every 24 hours  enoxaparin Injectable 60 milliGRAM(s) SubCutaneous every 12 hours  metoprolol succinate ER 25 milliGRAM(s) Oral daily      Vital Signs Last 24 Hrs  T(C): 37.2 (12 Jan 2023 09:26), Max: 37.4 (12 Jan 2023 04:43)  T(F): 98.9 (12 Jan 2023 09:26), Max: 99.4 (12 Jan 2023 04:43)  HR: 90 (12 Jan 2023 08:31) (90 - 98)  BP: 115/83 (12 Jan 2023 08:31) (111/61 - 140/80)  BP(mean): 93 (12 Jan 2023 08:31) (77 - 103)  RR: 17 (12 Jan 2023 08:31) (17 - 20)  SpO2: 100% (12 Jan 2023 08:31) (97% - 100%)    Parameters below as of 12 Jan 2023 08:31  Patient On (Oxygen Delivery Method): room air      I&O's Detail    11 Jan 2023 07:01  -  12 Jan 2023 07:00  --------------------------------------------------------  IN:    Fat Emulsion (Fish Oil &amp; Plant Based) 20% Infusion: 33.2 mL    Fat Emulsion (Fish Oil &amp; Plant Based) 20% Infusion: 99.6 mL    IV PiggyBack: 186 mL    IV PiggyBack: 100 mL    Oral Fluid: 500 mL    TPN (Total Parenteral Nutrition): 1200 mL  Total IN: 2118.8 mL    OUT:    Ileostomy (mL): 2050 mL    Voided (mL): 600 mL  Total OUT: 2650 mL    Total NET: -531.2 mL      12 Jan 2023 07:01  -  12 Jan 2023 09:54  --------------------------------------------------------  IN:    Fat Emulsion (Fish Oil &amp; Plant Based) 20% Infusion: 16.6 mL    Oral Fluid: 240 mL    TPN (Total Parenteral Nutrition): 100 mL  Total IN: 356.6 mL    OUT:    Voided (mL): 250 mL  Total OUT: 250 mL    Total NET: 106.6 mL      PE:    General: NAD, resting comfortably in bed  C/V: S1 s2, RRR  Pulm: Nonlabored breathing, no respiratory distress  Abd: Soft, NTND, ostomy with stool in the bag  Extrem: L LE dry gangrene, unchanged from prior exam, extending up to just below the knee, minimal areas of skin excoriation over dorsum of foot and lateral portion of leg        LABS:                        11.0   11.26 )-----------( 411      ( 12 Jan 2023 07:02 )             33.8     01-12    130<L>  |  99  |  15  ----------------------------<  106<H>  4.6   |  26  |  0.61    Ca    8.6      12 Jan 2023 07:02  Phos  3.2     01-12  Mg     2.0     01-12    TPro  7.6  /  Alb  3.0<L>  /  TBili  0.6  /  DBili  0.2  /  AST  42<H>  /  ALT  75<H>  /  AlkPhos  174<H>  01-12          RADIOLOGY & ADDITIONAL STUDIES:

## 2023-01-12 NOTE — BH CONSULTATION LIAISON PROGRESS NOTE - NSBHFUPINTERVALHXFT_PSY_A_CORE
Patient was seen at bedside. He reports lower mood, irritability, poor sleep in context of coping with his current medical issues and stress related to his relationships. He is in agreement with starting a medication. Patient was seen at bedside. He reports lower mood, irritability, poor sleep in context of coping with his current medical issues and stress related to his relationships. He acknowledges passive thoughts of death but no active SI.  He is in agreement with starting a medication.   Per the primary team, pt verbalized to the nursing staff SI. He was put on 1:1 observation.

## 2023-01-12 NOTE — PROGRESS NOTE ADULT - SUBJECTIVE AND OBJECTIVE BOX
INTERVAL HPI/OVERNIGHT EVENTS: minimal po, +F+BM. Ileostomy 1000mL, no bolus given (TPN),    STATUS POST:  Ex-lap, no dead gut, DANIEL of discontinuous gut, loop ileostomy    SUBJECTIVE:  pt seen at bedside, feeling okay. does not have complaints.    MEDICATIONS  (STANDING):  aMIOdarone    Tablet 100 milliGRAM(s) Oral every 24 hours  ascorbic acid 500 milliGRAM(s) Oral daily  chlorhexidine 2% Cloths 1 Application(s) Topical <User Schedule>  chlorhexidine 2% Cloths 1 Application(s) Topical <User Schedule>  dextrose 5%. 1000 milliLiter(s) (50 mL/Hr) IV Continuous <Continuous>  dextrose 5%. 1000 milliLiter(s) (100 mL/Hr) IV Continuous <Continuous>  diphenoxylate/atropine 2 Tablet(s) Oral three times a day  dronabinol 5 milliGRAM(s) Oral every 12 hours  enoxaparin Injectable 60 milliGRAM(s) SubCutaneous every 12 hours  fat emulsion (Fish Oil and Plant Based) 20% Infusion 0.31 Gm/kG/Day (8.33 mL/Hr) IV Continuous <Continuous>  glucagon  Injectable 1 milliGRAM(s) IntraMuscular once  influenza  Vaccine (HIGH DOSE) 0.7 milliLiter(s) IntraMuscular once  insulin lispro (ADMELOG) corrective regimen sliding scale   SubCutaneous every 6 hours  loperamide 4 milliGRAM(s) Oral three times a day  metoprolol succinate ER 25 milliGRAM(s) Oral daily  multivitamin 1 Tablet(s) Oral daily  opium Tincture 6 milliGRAM(s) Oral four times a day  pantoprazole    Tablet 40 milliGRAM(s) Oral two times a day  Parenteral Nutrition - Adult 1 Each (50 mL/Hr) TPN Continuous <Continuous>  psyllium Powder 1 Packet(s) Oral every 12 hours  silver sulfADIAZINE 1% Cream 1 Application(s) Topical daily  zinc sulfate 220 milliGRAM(s) Oral daily    MEDICATIONS  (PRN):  acetaminophen     Tablet .. 650 milliGRAM(s) Oral every 6 hours PRN Temp greater or equal to 38C (100.4F), Mild Pain (1 - 3)  dextrose Oral Gel 15 Gram(s) Oral once PRN Blood Glucose LESS THAN 70 milliGRAM(s)/deciliter  oxyCODONE    IR 5 milliGRAM(s) Oral every 4 hours PRN Severe Pain (7 - 10)  sodium chloride 0.9% lock flush 10 milliLiter(s) IV Push every 1 hour PRN Pre/post blood products, medications, blood draw, and to maintain line patency      Vital Signs Last 24 Hrs  T(C): 37.4 (12 Jan 2023 04:43), Max: 37.4 (12 Jan 2023 04:43)  T(F): 99.4 (12 Jan 2023 04:43), Max: 99.4 (12 Jan 2023 04:43)  HR: 90 (12 Jan 2023 04:06) (90 - 98)  BP: 140/80 (12 Jan 2023 04:06) (86/54 - 140/80)  BP(mean): 103 (12 Jan 2023 04:06) (64 - 103)  RR: 20 (12 Jan 2023 04:06) (17 - 20)  SpO2: 100% (12 Jan 2023 04:06) (97% - 100%)    Parameters below as of 12 Jan 2023 00:03  Patient On (Oxygen Delivery Method): room air        PHYSICAL EXAM:      Constitutional: A&Ox3    Respiratory: non labored breathing, no respiratory distress    Cardiovascular: NSR, RRR    Gastrointestinal: abdomen soft, NT/ND, incision c/d/i. Ileostomy w/ stoma pink and viable, gas and liquid stool in bag.     Extremities: (-) edema  L LE dry gangrene, no obvious changes from prior exam, dressed with betadine and kerlix per vascular              I&O's Detail    11 Jan 2023 07:01  -  12 Jan 2023 07:00  --------------------------------------------------------  IN:    Fat Emulsion (Fish Oil &amp; Plant Based) 20% Infusion: 33.2 mL    Fat Emulsion (Fish Oil &amp; Plant Based) 20% Infusion: 91.3 mL    IV PiggyBack: 186 mL    IV PiggyBack: 100 mL    Oral Fluid: 500 mL    TPN (Total Parenteral Nutrition): 1150 mL  Total IN: 2060.5 mL    OUT:    Ileostomy (mL): 1850 mL    Voided (mL): 600 mL  Total OUT: 2450 mL    Total NET: -389.5 mL          LABS:                        10.8   12.85 )-----------( 423      ( 11 Jan 2023 14:33 )             34.8     01-11    131<L>  |  100  |  11  ----------------------------<  126<H>  4.0   |  26  |  0.73    Ca    8.5      11 Jan 2023 14:33  Phos  2.6     01-11  Mg     1.8     01-11    TPro  7.3  /  Alb  3.3  /  TBili  0.8  /  DBili  x   /  AST  41<H>  /  ALT  86<H>  /  AlkPhos  178<H>  01-11          RADIOLOGY & ADDITIONAL STUDIES:

## 2023-01-12 NOTE — PROGRESS NOTE ADULT - ASSESSMENT
77y y/o Male with significant PMHx of IVDU found unresponsive at his nursing home, resolved after Narcan in the field, and brought to [facility]. Found to have PE, atrial flutter, and large LV thrombus on echo. Transferred to [facility] for further management. Pt C/o abdominal pain on 12/10 CTA showing mid SMA with embolus. Abnormal distal small bowel loops and cecum with dilatation and pneumatosis suggesting infarcted bowel. One or two tiny foci of  intrahepatic portal vein pneumatosis. Segmental infarction upper pole left kidney. Now s/p Ex lap, SMA embolectomy, 80cm SBR, abthera vac left in discontinuity (12/11) and transferred to SICU intubated. S/p second look (12/13) and most recently s/p OR for 3rd look, end-to-end anastomosis of remaining bowel, loop ileostomy and abdomen closure (12/15). Now stepped down with limb ischemia to LLE pending amputation. Ischemia has not yet fully demarcated, it is clear that the posterior calf (which is needed to heal a BKA flap) is involved and therefore only surgical option available to the patient is an AKA pending medical optimization.     Recommendations:    - If clinically feasible from primary team perspective, patient okay to be discharged, no vascular surgery intervention precluding discharge  - Patient can represent at another time when nutritional status is optimized for elective BKA  - Continue local wound care with betadine to all skin below the line of demarcation of his left shin and kerlix. No offloading boot, keep left heel elevated off bed.   - Continue supportive measures  - Rest of care per primary team   - Vascular surgery Team 3C will continue to follow. Please call [phone] with any questions or concerns

## 2023-01-13 NOTE — BH CONSULTATION LIAISON PROGRESS NOTE - NSBHASSESSMENTFT_PSY_ALL_CORE
77 year old male with history of substance use and no significant past medical history (poor medical follow up, last PCP visit 20 years prior to admission), presenting with unresponsiveness from nursing home to [facility], found to be in Aflutter w RVR with subsegmental PE RUL, transferred to [facility], found to have LV thrombus and worsening HF, EF 10-15%. 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 Course further complicated by LLE pulselessness and ulcerations that will ultimately require BKA. Psychiatry consulted due to depressed mood and suicidality.     (in progress) 77 year old male with history of substance use and no significant past medical history (poor medical follow up, last PCP visit 20 years prior to admission), presenting with unresponsiveness from nursing home to [facility], found to be in Aflutter w RVR with subsegmental PE RUL, transferred to [facility], found to have LV thrombus and worsening HF, EF 10-15%. 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 Course further complicated by LLE pulselessness and ulcerations that will ultimately require BKA. Psychiatry consulted due to depressed mood and suicidality.     The patient presents as depressed, verbalizing hopelessness and non-specific active suicidal ideation, while denying any suicidal plan or intent. Some challenges initiating sleep as well as health-related worry. No evidence of tyler, internal response to stimuli, or other psychotic phenomena. At the present time the patient does not appear to be at imminent risk of harm to self. No aggressive ideation/behavior noted or reported.     :::Recommendations:::  Plan:  - Remove 1:1; patient is not at high risk of suicide; instead utilize enhanced supervision  - When medically possible, group medical visits/interventions together such that the patient has extended periods of uninterrupted sleep. Impaired sleep has a direct relationship with the development of irritability and mood disorders.   - The patient greatly enjoys playing chess; a chess board is available through [employer] ([address] [facility] [employer]); the patient is aware that he can request the board be brought.  -Psychology will follow up for individual psychotherapy  -Contact CL with any questions/concerns  - No psychiatric contraindications to discharge

## 2023-01-13 NOTE — PROGRESS NOTE ADULT - SUBJECTIVE AND OBJECTIVE BOX
SUBJECTIVE: Patient seen and examined bedside; sleepy but arousable, didn't want to interact during encounter, tolerated well dressing change.    aMIOdarone    Tablet 100 milliGRAM(s) Oral every 24 hours  enoxaparin Injectable 60 milliGRAM(s) SubCutaneous every 12 hours  metoprolol succinate ER 25 milliGRAM(s) Oral daily      Vital Signs Last 24 Hrs  T(C): 37.1 (13 Jan 2023 09:01), Max: 37.5 (12 Jan 2023 22:00)  T(F): 98.7 (13 Jan 2023 09:01), Max: 99.5 (12 Jan 2023 22:00)  HR: 100 (13 Jan 2023 03:55) (90 - 100)  BP: 130/85 (13 Jan 2023 03:55) (111/77 - 132/75)  BP(mean): 93 (13 Jan 2023 03:55) (90 - 96)  RR: 18 (13 Jan 2023 03:55) (18 - 18)  SpO2: 99% (13 Jan 2023 03:55) (99% - 100%)    Parameters below as of 13 Jan 2023 03:55  Patient On (Oxygen Delivery Method): room air      I&O's Detail    12 Jan 2023 07:01  -  13 Jan 2023 07:00  --------------------------------------------------------  IN:    Fat Emulsion (Fish Oil &amp; Plant Based) 20% Infusion: 16.6 mL    Fat Emulsion (Fish Oil &amp; Plant Based) 20% Infusion: 166.4 mL    Oral Fluid: 480 mL    TPN (Total Parenteral Nutrition): 1000 mL  Total IN: 1663 mL    OUT:    Ileostomy (mL): 1600 mL    Voided (mL): 1100 mL  Total OUT: 2700 mL    Total NET: -1037 mL      PE:    General: NAD, resting comfortably in bed  C/V: S1 s2, RRR  Pulm: Nonlabored breathing, no respiratory distress  Abd: Soft, NTND  Extrem: L LE with dry gangrene, stable from prior exam, extending from toes to just below the knee, betadine applied and wrapped with kerlix        LABS:                        11.0   11.26 )-----------( 411      ( 12 Jan 2023 07:02 )             33.8     01-12    130<L>  |  99  |  15  ----------------------------<  106<H>  4.6   |  26  |  0.61    Ca    8.6      12 Jan 2023 07:02  Phos  3.2     01-12  Mg     2.0     01-12    TPro  7.6  /  Alb  3.0<L>  /  TBili  0.6  /  DBili  0.2  /  AST  42<H>  /  ALT  75<H>  /  AlkPhos  174<H>  01-12          RADIOLOGY & ADDITIONAL STUDIES:

## 2023-01-13 NOTE — BH CONSULTATION LIAISON PROGRESS NOTE - NSBHATTESTCOMMENTATTENDFT_PSY_A_CORE
agree with above. Pt describes feelings of hopelessness about his health and thoughts about suicide without any intent or plan. He spoke about his difficulty being in the hospital with strong motivation to get well; additional protective factors including children as outlined above. Agreeable to continue trial of mirtazapine for insomnia and depressive sx, though reports no improvement in sleep overnight. Will follow. No ongoing need for 1:1 observation for suicidality. No psychiatric barrier to discharge when medically ready

## 2023-01-13 NOTE — PROGRESS NOTE ADULT - ASSESSMENT
77y y/o Male with significant PMHx of IVDU found unresponsive at his nursing home, resolved after Narcan in the field, and brought to [facility]. Found to have PE, atrial flutter, and large LV thrombus on echo. Transferred to [facility] for further management. Pt C/o abdominal pain on 12/10 CTA showing mid SMA with embolus. Abnormal distal small bowel loops and cecum with dilatation and pneumatosis suggesting infarcted bowel. One or two tiny foci of  intrahepatic portal vein pneumatosis. Segmental infarction upper pole left kidney. Now s/p Ex lap, SMA embolectomy, 80cm SBR, abthera vac left in discontinuity (12/11) and transferred to SICU intubated. S/p second look (12/13) and most recently s/p OR for 3rd look, end-to-end anastomosis of remaining bowel, loop ileostomy and abdomen closure (12/15). Now stepped down with limb ischemia to LLE pending amputation. Ischemia has not yet fully demarcated, it is clear that the posterior calf (which is needed to heal a BKA flap) is involved and therefore only surgical option available to the patient is an AKA pending medical optimization.     Recommendations:    - If clinically feasible from primary team perspective, patient okay to be discharged, no vascular surgery intervention precluding discharge  - Patient can represent at another time when nutritional status is optimized for elective amputation  - Continue local wound care with betadine to all skin below the line of demarcation of his left shin and kerlix. No offloading boot, keep left heel elevated off bed.   - Continue supportive measures  - Rest of care per primary team   - Vascular surgery Team 3C will continue to follow. Please call [phone] with any questions or concerns

## 2023-01-13 NOTE — BH CONSULTATION LIAISON PROGRESS NOTE - NSBHFUPINTERVALHXFT_PSY_A_CORE
The patient was asleep in bed upon approach, though responded to verbal prompting. Lethargic at first, the patient engaged in discussion while playing chess with the provider. The patient expressed depressed mood and feelings of hopelessness, stating that he has suicidal thoughts. Patient strongly denied any suicidal plan or intent, stating that he is motivated by his "will to live". Patient also verbalized worry with respect to his medical health. Reported difficulties in sleep onset, though also verbalized frustration with interrupted sleep due to medical care. No evidence of tyler, psychosis, response to internal stimuli. No aggression noted or reported.

## 2023-01-13 NOTE — CHART NOTE - NSCHARTNOTEFT_GEN_A_CORE
Labs reviewed.  Liver enzymes stable.    Hepatitis A immune    Hepatitis C ab positive, negative PCR suggesting cleared infxn    Hepatitis B surface Ab, Core Ab positive indicating prior exposure to Hep B. Unclear if cleared or chronic infection.    Recommendations:  -Please obtain Hep B virus PCR  -Will follow up result    Eugenio Meadows DO  Gastroenterology Fellow  Pager: [phone]

## 2023-01-13 NOTE — PROGRESS NOTE ADULT - SUBJECTIVE AND OBJECTIVE BOX
INTERVAL HPI/OVERNIGHT EVENTS: daytime ostomy 800, , not bolused.    SUBJECTIVE: Patient seen and examined at bedside with chief. He denies abdominal pain, n/v, cp, sob.       aMIOdarone    Tablet 100 milliGRAM(s) Oral every 24 hours  enoxaparin Injectable 60 milliGRAM(s) SubCutaneous every 12 hours  metoprolol succinate ER 25 milliGRAM(s) Oral daily      Vital Signs Last 24 Hrs  T(C): 36.8 (13 Jan 2023 18:06), Max: 37.5 (12 Jan 2023 22:00)  T(F): 98.3 (13 Jan 2023 18:06), Max: 99.5 (12 Jan 2023 22:00)  HR: 100 (13 Jan 2023 15:00) (92 - 100)  BP: 166/94 (13 Jan 2023 15:00) (110/75 - 166/94)  BP(mean): 113 (13 Jan 2023 15:00) (75 - 113)  RR: 18 (13 Jan 2023 15:00) (18 - 18)  SpO2: 94% (13 Jan 2023 15:00) (91% - 100%)    Parameters below as of 13 Jan 2023 15:00  Patient On (Oxygen Delivery Method): room air      I&O's Detail    12 Jan 2023 07:01  -  13 Jan 2023 07:00  --------------------------------------------------------  IN:    Fat Emulsion (Fish Oil &amp; Plant Based) 20% Infusion: 16.6 mL    Fat Emulsion (Fish Oil &amp; Plant Based) 20% Infusion: 166.4 mL    Oral Fluid: 480 mL    TPN (Total Parenteral Nutrition): 1000 mL  Total IN: 1663 mL    OUT:    Ileostomy (mL): 1600 mL    Voided (mL): 1100 mL  Total OUT: 2700 mL    Total NET: -1037 mL      13 Jan 2023 07:01  -  13 Jan 2023 17:15  --------------------------------------------------------  IN:    Fat Emulsion (Fish Oil &amp; Plant Based) 20% Infusion: 124.8 mL    Lactated Ringers Bolus: 500 mL    Oral Fluid: 240 mL    TPN (Total Parenteral Nutrition): 450 mL  Total IN: 1314.8 mL    OUT:    Ileostomy (mL): 600 mL    Voided (mL): 370 mL  Total OUT: 970 mL    Total NET: 344.8 mL          General: NAD, resting comfortably in bed  C/V: NSR  Pulm: Nonlabored breathing, no respiratory distress  Abd: soft, nondistended, nontender. No rebound or guarding. Midline incision healing well without erythema or drainage. Ostomy noted in RLQ to be pink and patent with stool and gas in   Extrem: WWP, no edema, SCDs in place      LABS:                        11.0   11.26 )-----------( 411      ( 12 Jan 2023 07:02 )             33.8     01-12    130<L>  |  99  |  15  ----------------------------<  106<H>  4.6   |  26  |  0.61    Ca    8.6      12 Jan 2023 07:02  Phos  3.2     01-12  Mg     2.0     01-12    TPro  7.6  /  Alb  3.0<L>  /  TBili  0.6  /  DBili  0.2  /  AST  42<H>  /  ALT  75<H>  /  AlkPhos  174<H>  01-12          RADIOLOGY & ADDITIONAL STUDIES:

## 2023-01-13 NOTE — CHART NOTE - NSCHARTNOTEFT_GEN_A_CORE
Admitting Diagnosis:   Patient is a 77y old  Male who presents with a chief complaint of A flutter (2023 09:24)    PAST MEDICAL & SURGICAL HISTORY:  PMHx of IVDU     Current Nutrition Order:  Soft and bite sized low fiber diet with x2 Ensure Enlive TID (2100 kcal, 120g protein, 1080mL free H2O)  TPN: 1200 ml TV @ 50 ml/hr providing 165g Dex, 50g AA, 20g SMOF lipids to provide 961 kcal, 50g pro, GIR 1.79. RD to follow.     PO Intake: Good (%) [   ]  Fair (50-75%) [ x  ] Poor (<25%) [   ]- please see kcal count from  for full assessment of PO intake.     GI Issues:   WDL, last BM   Ileostomy (1600 ml/24hrs)  No n/v/d/c  No abd distention or discomfort noted    Pain:  No pain while resting. 10/10 LLE pain with movement.     Skin Integrity:  Surgical incision, carla score 13  +1 pitting edema BL knee  No pressure ulcers noted    Labs:       130<L>  |  99  |  15  ----------------------------<  106<H>  4.6   |  26  |  0.61    Ca    8.6      2023 07:02  Phos  3.2       Mg     2.0         TPro  7.6  /  Alb  3.0<L>  /  TBili  0.6  /  DBili  0.2  /  AST  42<H>  /  ALT  75<H>  /  AlkPhos  174<H>      CAPILLARY BLOOD GLUCOSE    POCT Blood Glucose.: 109 mg/dL (2023 12:40)    Medications:  MEDICATIONS  (STANDING):  aMIOdarone    Tablet 100 milliGRAM(s) Oral every 24 hours  ascorbic acid 500 milliGRAM(s) Oral daily  chlorhexidine 2% Cloths 1 Application(s) Topical <User Schedule>  chlorhexidine 2% Cloths 1 Application(s) Topical <User Schedule>  dextrose 5%. 1000 milliLiter(s) (50 mL/Hr) IV Continuous <Continuous>  dextrose 5%. 1000 milliLiter(s) (100 mL/Hr) IV Continuous <Continuous>  diphenoxylate/atropine 2 Tablet(s) Oral three times a day  dronabinol 5 milliGRAM(s) Oral every 12 hours  enoxaparin Injectable 60 milliGRAM(s) SubCutaneous every 12 hours  fat emulsion (Fish Oil and Plant Based) 20% Infusion 0.7764 Gm/kG/Day (20.83 mL/Hr) IV Continuous <Continuous>  glucagon  Injectable 1 milliGRAM(s) IntraMuscular once  influenza  Vaccine (HIGH DOSE) 0.7 milliLiter(s) IntraMuscular once  insulin lispro (ADMELOG) corrective regimen sliding scale   SubCutaneous every 6 hours  loperamide 4 milliGRAM(s) Oral three times a day  metoprolol succinate ER 25 milliGRAM(s) Oral daily  mirtazapine 15 milliGRAM(s) Oral at bedtime  multivitamin 1 Tablet(s) Oral daily  opium Tincture 6 milliGRAM(s) Oral four times a day  pantoprazole    Tablet 40 milliGRAM(s) Oral two times a day  Parenteral Nutrition - Adult 1 Each (50 mL/Hr) TPN Continuous <Continuous>  Parenteral Nutrition - Adult 1 Each (50 mL/Hr) TPN Continuous <Continuous>  psyllium Powder 1 Packet(s) Oral every 12 hours  silver sulfADIAZINE 1% Cream 1 Application(s) Topical daily  zinc sulfate 220 milliGRAM(s) Oral daily    MEDICATIONS  (PRN):  acetaminophen     Tablet .. 650 milliGRAM(s) Oral every 6 hours PRN Temp greater or equal to 38C (100.4F), Mild Pain (1 - 3)  dextrose Oral Gel 15 Gram(s) Oral once PRN Blood Glucose LESS THAN 70 milliGRAM(s)/deciliter  oxyCODONE    IR 5 milliGRAM(s) Oral every 4 hours PRN Severe Pain (7 - 10)  sodium chloride 0.9% lock flush 10 milliLiter(s) IV Push every 1 hour PRN Pre/post blood products, medications, blood draw, and to maintain line patency    Admitting Anthropometrics:  '6'  pounds +-10%  Wt 142 pounds BMI 16.4 %IBW=66%     Weight Change:    141.9 pounds - dosing wt    136 pounds - Bedscale wt     **PCM:  >> Reports having 1-2 meals/day + ONS. Per pt claims to have 2 ensure/day and 2 nutrament/day - unclear how accurate this is. ?If pt meeting ~75% EER consistently.  >> Does report wt loss.  pounds x6 months ago. Thinks he now is 135 pounds which is consistent with bedscale wt obtained during initial visit by  pounds. Suggest wt loss of 49 pounds/26% body wt loss.  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     Estimated energy needs:  Current body wt for EER based on clinician judgment. Adjust for age, malnutrition, EF, S/p OR, Pressure ulcer; fluids per team  25-30kcal/k-1950kcal/day   1.3-1.5gm/k-98gm prot/day   **Rec upper end of needs     Subjective:  77M with PMHx of IVDU found unresponsive at his nursing home, resolved after Narcan in the field and brought to [facility]. Found to have PE, atrial flutter, and large LV thrombus on echo. Transferred to [facility] for further management. Pt c/o abdominal pain on 12/10 CTA showing mid SMA with embolus. Abnormal distal small bowel loops and cecum with dilatation and pneumatosis suggesting infarcted bowel. One or two tiny foci of intrahepatic portal vein pneumatosis. Segmental infarction upper pole left kidney. Now s/p Ex lap, SMA embolectomy, 80cm SBR, abthera vac left in discontinuity () and transferred to SICU intubated. S/p second look () and most recently s/p OR for 3rd look, end-to-end anastomosis of remaining bowel, loop ileostomy and abdomen closure (12/15). Transferred to CCU on  for cardiac optimization and now transferred back to SICU  for bleeding hemodynamic instability. Echo  shows EF 10-15% with overall hypokinesis. CTA performed demonstrating large hematoma in R abdomen, new hemoperitoneum, and bilateral pleural effusions. Remains in SICU, now extubated with still with limb ischemia to LLE pending amputation and AC held given BRBPR and hematoma; noted pt agreeable for AKA when feasible - AKA currently Pending Cards. Pt s/p DCCV on  (negative LORENZO on ) with successful conversion to NSR. Planning for AKA with vascular surgery once nutrition status is optimize. Team placed PICC 1/10 and initiated supplemental TPN.     On assessment, pt seen resting in bed. Currently on soft and bite sized low fiber with x2 Ensure Enlive TID (2100 kcal, 120g protein, 1080mL free H2O) with supplemental TPN meeting ~50% of est needs. Pt consuming~25-50% of est needs with x2-3 ensures daily- cont to provide pt with foods he enjoys/ have family bring in food for pt. Cont to still have a large amount of output via ileostomy (1600 ml/24hrs)- on metamucil, and imodium. Recommend increasing TPN to meet ~75% est needs for likely malabsorption. RD cont to encourage adequate PO intake as tolerated for pre-op nutrition optimization. RD to follow.     Prior PES: Malnutrition Severe in Chronic state RT presumed intake<EER AEB NFPE, wt loss, PO intake  >>on going  Goal: Pt will meet at least 75% of nutrient needs via feasible route / Show no further s/s of malnutrition    Recommendations:  1. Soft and bite sized High Fiber diet with Ensure Enlive TID (1050 kcal, 60g protein, 540mL free H2O)  >> Recommend transition to Soft and bite sized Low fiber diet with cont supplementation of soluble fiber to aid with forming stool.   2. Recommend increasing supplemental TPN to meet 75% of upper end of est needs to aid with likely malabsorption; 225g Dex, 70g AA, 20g SMOF lipids to provide 1462 kcal, 70g pro, GIR 2.4. RD to follow.  >> Cont to trend TG weekly  3. Monitor Skin, Wts daily, GOC. Pain/GI per team.   >>Cont with imodium and metamucil per team  4. Labs: monitor BMP, CBC, glucose, lytes - Replete PRN, trend renal indices, LFts, POCT.  5. RD to remain available for additional Recs.    **Disc with team    Education:  Cont to encourage adequate PO intake as tolerated for pre-op nutrition optimization.     Risk Level: High [X   ] Moderate [   ] Low [   ].

## 2023-01-13 NOTE — PROGRESS NOTE ADULT - SUBJECTIVE AND OBJECTIVE BOX
Patient was seen and examined at bedside. Case discuss with resident. Pt reports that he did not sleep well last night again. Pt also reports having some left leg discomfort this morning.     OBJECTIVE:  Vital Signs Last 24 Hrs  T(C): 37.1 (13 Jan 2023 09:01), Max: 37.5 (12 Jan 2023 22:00)  T(F): 98.7 (13 Jan 2023 09:01), Max: 99.5 (12 Jan 2023 22:00)  HR: 100 (13 Jan 2023 08:13) (90 - 100)  BP: 112/58 (13 Jan 2023 08:13) (111/77 - 132/75)  BP(mean): 75 (13 Jan 2023 08:13) (75 - 96)  RR: 18 (13 Jan 2023 08:13) (18 - 18)  SpO2: 100% (13 Jan 2023 08:13) (99% - 100%)    Parameters below as of 13 Jan 2023 08:13  Patient On (Oxygen Delivery Method): room air    PHYSICAL EXAM:  Gen: NAD laying in bed; pt being observed   CV: RRR, +S1/S2, no mumur  Pulm: CTA b/l no wheezing or crackles   Abd: soft, NTND + BS no rebound or guarding   Left leg bandage C/D/I       LABS:                        11.0   11.26 )-----------( 411      ( 12 Jan 2023 07:02 )             33.8     01-12    130<L>  |  99  |  15  ----------------------------<  106<H>  4.6   |  26  |  0.61    Ca    8.6      12 Jan 2023 07:02  Phos  3.2     01-12  Mg     2.0     01-12    TPro  7.6  /  Alb  3.0<L>  /  TBili  0.6  /  DBili  0.2  /  AST  42<H>  /  ALT  75<H>  /  AlkPhos  174<H>  01-12    LIVER FUNCTIONS - ( 12 Jan 2023 10:03 )  Alb: x     / Pro: x     / ALK PHOS: x     / ALT: x     / AST: x     / GGT: 88 U/L         Hepatitis A immune    Hepatitis C ab positive, negative PCR suggesting cleared infxn    Hepatitis B surface Ab, Core Ab positive indicating prior exposure to Hep B. Unclear if cleared or chronic infection.    Serum Osm: 277    MEDICATIONS:   acetaminophen     Tablet .. 650 milliGRAM(s) Oral every 6 hours PRN  aMIOdarone    Tablet 100 milliGRAM(s) Oral every 24 hours  ascorbic acid 500 milliGRAM(s) Oral daily  chlorhexidine 2% Cloths 1 Application(s) Topical <User Schedule>  chlorhexidine 2% Cloths 1 Application(s) Topical <User Schedule>  dextrose 5%. 1000 milliLiter(s) IV Continuous <Continuous>  dextrose 5%. 1000 milliLiter(s) IV Continuous <Continuous>  dextrose Oral Gel 15 Gram(s) Oral once PRN  diphenoxylate/atropine 2 Tablet(s) Oral three times a day  dronabinol 5 milliGRAM(s) Oral every 12 hours  enoxaparin Injectable 60 milliGRAM(s) SubCutaneous every 12 hours  glucagon  Injectable 1 milliGRAM(s) IntraMuscular once  influenza  Vaccine (HIGH DOSE) 0.7 milliLiter(s) IntraMuscular once  insulin lispro (ADMELOG) corrective regimen sliding scale   SubCutaneous every 6 hours  loperamide 4 milliGRAM(s) Oral three times a day  metoprolol succinate ER 25 milliGRAM(s) Oral daily  mirtazapine 15 milliGRAM(s) Oral at bedtime  multivitamin 1 Tablet(s) Oral daily  opium Tincture 6 milliGRAM(s) Oral four times a day  oxyCODONE    IR 5 milliGRAM(s) Oral every 4 hours PRN  pantoprazole    Tablet 40 milliGRAM(s) Oral two times a day  Parenteral Nutrition - Adult 1 Each TPN Continuous <Continuous>  psyllium Powder 1 Packet(s) Oral every 12 hours  silver sulfADIAZINE 1% Cream 1 Application(s) Topical daily  sodium chloride 0.9% lock flush 10 milliLiter(s) IV Push every 1 hour PRN  zinc sulfate 220 milliGRAM(s) Oral daily       A/P: 77 year old man who first presented to [facility] from [facility] due to unresponsiveness (responded to Narcan). At [facility], found to have subsegmental pulmonary embolism in RUL, rapid atrial flutter with severely reduced LVEF with LV thrombus. Transferred to [facility] on 12/7/22 for LORENZO and possible ablation. At [facility], was found to have left leg ischemia (left leg arterial thrombus on LE duplex on 12/8). Was being considered for rhythm control when he developed abdominal pain, CTA (12/10/22) showed embolus in SMA, bowel infarct, requiring ex-lap on 12/11 with SMA embolectomy, 80cm small bowel resection; On 12/13, underwent 2nd look laparotomy, with 80cm small bowel resection, closure with abthera. On 12/15, underwent 3rd look laparotomy, end-to-end anastomosis of remaining bowel, loop ileostomy and abdominal closure. Now extubated, on tele floor. Eventually underwent LORENZO/DCCV on 12/30/22, now in sinus rhythm. Currently being evaluated for possible above knee amputation for LLE ischemia (possibly deferring till next hospitalization, after optimization of nutritional status)      # Left leg ischemia - Decision re: timing of Above Knee Amputation per vascular surgery and primary team. Optimizing nutritional status prior to surgery     #Suicide Ideation  - Psych consult appreciated  - Will continue 1 to 1 for suicidal ideation    #Insomnia   - Psych consult appreciated and per Psych recommendation the pt was started on Mirtazapine 15mg po qhs for insomnia and    #Transaminitis   - Amiodarone dose decreased  to 100mg qd given age, low BMI (BMI 16) , frailty, and uptrending liver tests   - Will continue to trend   GI follow up appreciated and per GI recommendations will check a Hep B virus PCR    # Acute Systolic Heart Failure   - Pt is currently being Followed by heart failure team  - Continue Metoprolol succinate 25mg qd     # Atrial Flutter   - EP following, now s/p DCCV 12/30. EP recommending uninterrupted AC x 30 days ; Pt is on enoxaparin 60mg SQ q12). Pt is on  amiodarone 100mg qd, metoprolol succinate 25 qd . Currently in sinus rhythm     # Pulmonary embolism (subsegmental RUL)   - After completion of AC for DCCV, would need to continue AC for PE. On lovenox full dose.    # Bowel ischemia - s/p SMA embolectomy; Small bowel resection 80 cm + 40 cm; ileostomy in place; mgmt per primary team   - Monitor ileostomy output on loperamide 4mg TID, tincture of opium QID       # Acute blood loss anemia   - Hgb downtrended from time of admission (Hgb 14 --> ~11); partially attributable to operative blood losses (underwent laparotomy x 3)  -Will continue to monitor H/H and maintain an active T&S     # Hyponatremia   -Pt's Na 131->130   - Please order  urine Na   -Will continue to monitor Na  - Will give a 500 cc bolus of NS     # Severe protein-calorie malnutrition   - agree with nutrition service's assessment ; supplemental nutrition per nutrition recs   - Getting dronabinol 5mg q12 hrs     # Sacral wound - continue off loading, dressings        #GI PPX  #DVT PPX   - Pt is on pantoprazole 40mg PO BID   -Pt is on full dose AC with lovenox     #DISPO  - As per primary team

## 2023-01-14 NOTE — PROGRESS NOTE ADULT - ASSESSMENT
77M with PMHx of IVDU found unresponsive at his nursing home, resolved after Narcan in the field and brought to [facility]. Found to have PE, atrial flutter, and large LV thrombus on echo. Transferred to [facility] for further management. Pt c/o abdominal pain on 12/10 CTA showing mid SMA with embolus. Abnormal distal small bowel loops and cecum with dilatation and pneumatosis suggesting infarcted bowel. One or two tiny foci of intrahepatic portal vein pneumatosis. Segmental infarction upper pole left kidney. Now s/p Ex lap, SMA embolectomy, 80cm SBR, abthera vac left in discontinuity (12/11) and transferred to SICU intubated. S/p second look (12/13) and most recently s/p OR for 3rd look, end-to-end anastomosis of remaining bowel, loop ileostomy and abdomen closure (12/15). Remains in SICU, now extubated with still with limb ischemia to LLE pending amputation. Now stepped down to tele.     Reg/low fiber diet + ensures, TPN  Pain/nausea control  Imodium, metamucin, tincture of opium for high ileostomy output  A flutter now s/p cardioversion, amio 100 QD, toprolol XL 25 QD  LV thrombus w LLE limb ischemia- Heparin gtt changed to SQL BID  Planning for AKA with vascular surgery  AM labs

## 2023-01-14 NOTE — PROGRESS NOTE ADULT - SUBJECTIVE AND OBJECTIVE BOX
Patient was seen and examined at bedside. Case discuss with resident. The pt was sleepy and he did not wish to be examined this morning.     OBJECTIVE:  Vital Signs Last 24 Hrs  T(C): 37.1 (14 Jan 2023 09:33), Max: 37.1 (14 Jan 2023 09:33)  T(F): 98.7 (14 Jan 2023 09:33), Max: 98.7 (14 Jan 2023 09:33)  HR: 98 (14 Jan 2023 08:50) (98 - 108)  BP: 120/66 (14 Jan 2023 08:50) (120/66 - 166/94)  BP(mean): 86 (14 Jan 2023 08:50) (86 - 113)  RR: 17 (14 Jan 2023 08:50) (17 - 18)  SpO2: 95% (14 Jan 2023 08:50) (94% - 100%)    Parameters below as of 14 Jan 2023 08:50  Patient On (Oxygen Delivery Method): room air          PHYSICAL EXAM:  Gen: NAD laying in bed  CV: RRR, +S1/S2, no mumur  Pulm: CTA b/l no wheezing or crackles   Abd: soft, NTND + BS no rebound or guarding       LABS:                        10.5   11.16 )-----------( 379      ( 14 Jan 2023 05:30 )             32.7     01-14    133<L>  |  98  |  11  ----------------------------<  135<H>  3.9   |  27  |  0.68    Ca    8.5      14 Jan 2023 05:30  Phos  3.0     01-14  Mg     1.9     01-14    TPro  7.3  /  Alb  2.9<L>  /  TBili  0.6  /  DBili  x   /  AST  35  /  ALT  68<H>  /  AlkPhos  175<H>  01-14    LIVER FUNCTIONS - ( 14 Jan 2023 05:30 )  Alb: 2.9 g/dL / Pro: 7.3 g/dL / ALK PHOS: 175 U/L / ALT: 68 U/L / AST: 35 U/L / GGT: x             CAPILLARY BLOOD GLUCOSE      POCT Blood Glucose.: 129 mg/dL (14 Jan 2023 11:46)  POCT Blood Glucose.: 119 mg/dL (13 Jan 2023 19:02)        acetaminophen     Tablet .. 650 milliGRAM(s) Oral every 6 hours PRN  aMIOdarone    Tablet 100 milliGRAM(s) Oral every 24 hours  ascorbic acid 500 milliGRAM(s) Oral daily  chlorhexidine 2% Cloths 1 Application(s) Topical <User Schedule>  chlorhexidine 2% Cloths 1 Application(s) Topical <User Schedule>  dextrose 5%. 1000 milliLiter(s) IV Continuous <Continuous>  dextrose 5%. 1000 milliLiter(s) IV Continuous <Continuous>  dextrose Oral Gel 15 Gram(s) Oral once PRN  diphenoxylate/atropine 2 Tablet(s) Oral three times a day  dronabinol 5 milliGRAM(s) Oral every 12 hours  enoxaparin Injectable 60 milliGRAM(s) SubCutaneous every 12 hours  fat emulsion (Fish Oil and Plant Based) 20% Infusion 0.7764 Gm/kG/Day IV Continuous <Continuous>  glucagon  Injectable 1 milliGRAM(s) IntraMuscular once  influenza  Vaccine (HIGH DOSE) 0.7 milliLiter(s) IntraMuscular once  insulin lispro (ADMELOG) corrective regimen sliding scale   SubCutaneous every 6 hours  lactated ringers Bolus 500 milliLiter(s) IV Bolus once  loperamide 4 milliGRAM(s) Oral three times a day  metoprolol succinate ER 25 milliGRAM(s) Oral daily  mirtazapine 15 milliGRAM(s) Oral at bedtime  multivitamin 1 Tablet(s) Oral daily  opium Tincture 6 milliGRAM(s) Oral four times a day  oxyCODONE    IR 5 milliGRAM(s) Oral every 4 hours PRN  pantoprazole    Tablet 40 milliGRAM(s) Oral two times a day  Parenteral Nutrition - Adult 1 Each TPN Continuous <Continuous>  Parenteral Nutrition - Adult 1 Each TPN Continuous <Continuous>  psyllium Powder 1 Packet(s) Oral every 12 hours  silver sulfADIAZINE 1% Cream 1 Application(s) Topical daily  sodium chloride 0.9% lock flush 10 milliLiter(s) IV Push every 1 hour PRN  zinc sulfate 220 milliGRAM(s) Oral daily      A/P:               Patient was seen and examined at bedside. Case discuss with resident. The pt was sleepy and he did not wish to be examined this morning.     OBJECTIVE:  Vital Signs Last 24 Hrs  T(C): 37.1 (14 Jan 2023 09:33), Max: 37.1 (14 Jan 2023 09:33)  T(F): 98.7 (14 Jan 2023 09:33), Max: 98.7 (14 Jan 2023 09:33)  HR: 98 (14 Jan 2023 08:50) (98 - 108)  BP: 120/66 (14 Jan 2023 08:50) (120/66 - 166/94)  BP(mean): 86 (14 Jan 2023 08:50) (86 - 113)  RR: 17 (14 Jan 2023 08:50) (17 - 18)  SpO2: 95% (14 Jan 2023 08:50) (94% - 100%)    Parameters below as of 14 Jan 2023 08:50  Patient On (Oxygen Delivery Method): room air      PHYSICAL EXAM:  Pt refused exam this morning       LABS:                        10.5   11.16 )-----------( 379      ( 14 Jan 2023 05:30 )             32.7     01-14    133<L>  |  98  |  11  ----------------------------<  135<H>  3.9   |  27  |  0.68    Ca    8.5      14 Jan 2023 05:30  Phos  3.0     01-14  Mg     1.9     01-14    TPro  7.3  /  Alb  2.9<L>  /  TBili  0.6  /  DBili  x   /  AST  35  /  ALT  68<H>  /  AlkPhos  175<H>  01-14    LIVER FUNCTIONS - ( 14 Jan 2023 05:30 )  Alb: 2.9 g/dL / Pro: 7.3 g/dL / ALK PHOS: 175 U/L / ALT: 68 U/L / AST: 35 U/L / GGT: x             CAPILLARY BLOOD GLUCOSE  POCT Blood Glucose.: 129 mg/dL (14 Jan 2023 11:46)  POCT Blood Glucose.: 119 mg/dL (13 Jan 2023 19:02)    acetaminophen     Tablet .. 650 milliGRAM(s) Oral every 6 hours PRN  aMIOdarone    Tablet 100 milliGRAM(s) Oral every 24 hours  ascorbic acid 500 milliGRAM(s) Oral daily  chlorhexidine 2% Cloths 1 Application(s) Topical <User Schedule>  chlorhexidine 2% Cloths 1 Application(s) Topical <User Schedule>  dextrose 5%. 1000 milliLiter(s) IV Continuous <Continuous>  dextrose 5%. 1000 milliLiter(s) IV Continuous <Continuous>  dextrose Oral Gel 15 Gram(s) Oral once PRN  diphenoxylate/atropine 2 Tablet(s) Oral three times a day  dronabinol 5 milliGRAM(s) Oral every 12 hours  enoxaparin Injectable 60 milliGRAM(s) SubCutaneous every 12 hours  fat emulsion (Fish Oil and Plant Based) 20% Infusion 0.7764 Gm/kG/Day IV Continuous <Continuous>  glucagon  Injectable 1 milliGRAM(s) IntraMuscular once  influenza  Vaccine (HIGH DOSE) 0.7 milliLiter(s) IntraMuscular once  insulin lispro (ADMELOG) corrective regimen sliding scale   SubCutaneous every 6 hours  lactated ringers Bolus 500 milliLiter(s) IV Bolus once  loperamide 4 milliGRAM(s) Oral three times a day  metoprolol succinate ER 25 milliGRAM(s) Oral daily  mirtazapine 15 milliGRAM(s) Oral at bedtime  multivitamin 1 Tablet(s) Oral daily  opium Tincture 6 milliGRAM(s) Oral four times a day  oxyCODONE    IR 5 milliGRAM(s) Oral every 4 hours PRN  pantoprazole    Tablet 40 milliGRAM(s) Oral two times a day  Parenteral Nutrition - Adult 1 Each TPN Continuous <Continuous>  Parenteral Nutrition - Adult 1 Each TPN Continuous <Continuous>  psyllium Powder 1 Packet(s) Oral every 12 hours  silver sulfADIAZINE 1% Cream 1 Application(s) Topical daily  sodium chloride 0.9% lock flush 10 milliLiter(s) IV Push every 1 hour PRN  zinc sulfate 220 milliGRAM(s) Oral daily      A/P: 77 year old man who first presented to [facility] from [facility] due to unresponsiveness (responded to Narcan). At [facility], found to have subsegmental pulmonary embolism in RUL, rapid atrial flutter with severely reduced LVEF with LV thrombus. Transferred to [facility] on 12/7/22 for LORENZO and possible ablation. At [facility], was found to have left leg ischemia (left leg arterial thrombus on LE duplex on 12/8). Was being considered for rhythm control when he developed abdominal pain, CTA (12/10/22) showed embolus in SMA, bowel infarct, requiring ex-lap on 12/11 with SMA embolectomy, 80cm small bowel resection; On 12/13, underwent 2nd look laparotomy, with 80cm small bowel resection, closure with abthera. On 12/15, underwent 3rd look laparotomy, end-to-end anastomosis of remaining bowel, loop ileostomy and abdominal closure. Now extubated, on tele floor. Eventually underwent LORENZO/DCCV on 12/30/22, now in sinus rhythm. Currently being evaluated for possible above knee amputation for LLE ischemia (possibly deferring till next hospitalization, after optimization of nutritional status)      # Left leg ischemia - Decision re: timing of Above Knee Amputation per vascular surgery and primary team. Optimizing nutritional status prior to surgery     #Suicide Ideation  - Psych follow appreciated and pt was deemed not to be suicidal and pt's 1 to 1 was removed  - Will continue 1 to 1 for suicidal ideation    #Insomnia   - Continue Mirtazapine 15mg po qhs for insomnia    #Transaminitis   - Will continue to trend   -Will f/u Hep B virus PCR    # Acute Systolic Heart Failure   - Continue Metoprolol succinate 25mg qd     # Atrial Flutter   - EP following, now s/p DCCV 12/30. EP recommending uninterrupted AC x 30 days   -Continue enoxaparin 60mg SQ q12  -Continue  amiodarone 100mg qd and metoprolol succinate 25 qd     # Pulmonary embolism (subsegmental RUL)   - After completion of AC for DCCV, would need to continue AC for PE  -Continue lovenox    # Bowel ischemia - s/p SMA embolectomy; Small bowel resection 80 cm + 40 cm; ileostomy in place; mgmt per primary team   - Monitor ileostomy output on loperamide 4mg TID, tincture of opium QID     # Acute blood loss anemia   -Will continue to monitor H/H and maintain an active T&S     # Hyponatremia   -Pt's Na improved to 133 after 500cc bolus   -Will continue to monitor Na    # Severe protein-calorie malnutrition   - agree with nutrition service's assessment ; supplemental nutrition per nutrition recs   - Getting dronabinol 5mg q12 hrs     # Sacral wound - continue off loading, dressings    #GI /DVTPPX  -Continue pantoprazole 40mg PO BID   -Continue lovenox     #DISPO  - As per primary team                Patient was seen and examined at bedside. Case discuss with resident. The pt was sleepy and he did not wish to be examined this morning.     OBJECTIVE:  Vital Signs Last 24 Hrs  T(C): 37.1 (14 Jan 2023 09:33), Max: 37.1 (14 Jan 2023 09:33)  T(F): 98.7 (14 Jan 2023 09:33), Max: 98.7 (14 Jan 2023 09:33)  HR: 98 (14 Jan 2023 08:50) (98 - 108)  BP: 120/66 (14 Jan 2023 08:50) (120/66 - 166/94)  BP(mean): 86 (14 Jan 2023 08:50) (86 - 113)  RR: 17 (14 Jan 2023 08:50) (17 - 18)  SpO2: 95% (14 Jan 2023 08:50) (94% - 100%)    Parameters below as of 14 Jan 2023 08:50  Patient On (Oxygen Delivery Method): room air      PHYSICAL EXAM:  Pt refused exam this morning       LABS:                        10.5   11.16 )-----------( 379      ( 14 Jan 2023 05:30 )             32.7     01-14    133<L>  |  98  |  11  ----------------------------<  135<H>  3.9   |  27  |  0.68    Ca    8.5      14 Jan 2023 05:30  Phos  3.0     01-14  Mg     1.9     01-14    TPro  7.3  /  Alb  2.9<L>  /  TBili  0.6  /  DBili  x   /  AST  35  /  ALT  68<H>  /  AlkPhos  175<H>  01-14    LIVER FUNCTIONS - ( 14 Jan 2023 05:30 )  Alb: 2.9 g/dL / Pro: 7.3 g/dL / ALK PHOS: 175 U/L / ALT: 68 U/L / AST: 35 U/L / GGT: x             CAPILLARY BLOOD GLUCOSE  POCT Blood Glucose.: 129 mg/dL (14 Jan 2023 11:46)  POCT Blood Glucose.: 119 mg/dL (13 Jan 2023 19:02)    acetaminophen     Tablet .. 650 milliGRAM(s) Oral every 6 hours PRN  aMIOdarone    Tablet 100 milliGRAM(s) Oral every 24 hours  ascorbic acid 500 milliGRAM(s) Oral daily  chlorhexidine 2% Cloths 1 Application(s) Topical <User Schedule>  chlorhexidine 2% Cloths 1 Application(s) Topical <User Schedule>  dextrose 5%. 1000 milliLiter(s) IV Continuous <Continuous>  dextrose 5%. 1000 milliLiter(s) IV Continuous <Continuous>  dextrose Oral Gel 15 Gram(s) Oral once PRN  diphenoxylate/atropine 2 Tablet(s) Oral three times a day  dronabinol 5 milliGRAM(s) Oral every 12 hours  enoxaparin Injectable 60 milliGRAM(s) SubCutaneous every 12 hours  fat emulsion (Fish Oil and Plant Based) 20% Infusion 0.7764 Gm/kG/Day IV Continuous <Continuous>  glucagon  Injectable 1 milliGRAM(s) IntraMuscular once  influenza  Vaccine (HIGH DOSE) 0.7 milliLiter(s) IntraMuscular once  insulin lispro (ADMELOG) corrective regimen sliding scale   SubCutaneous every 6 hours  lactated ringers Bolus 500 milliLiter(s) IV Bolus once  loperamide 4 milliGRAM(s) Oral three times a day  metoprolol succinate ER 25 milliGRAM(s) Oral daily  mirtazapine 15 milliGRAM(s) Oral at bedtime  multivitamin 1 Tablet(s) Oral daily  opium Tincture 6 milliGRAM(s) Oral four times a day  oxyCODONE    IR 5 milliGRAM(s) Oral every 4 hours PRN  pantoprazole    Tablet 40 milliGRAM(s) Oral two times a day  Parenteral Nutrition - Adult 1 Each TPN Continuous <Continuous>  Parenteral Nutrition - Adult 1 Each TPN Continuous <Continuous>  psyllium Powder 1 Packet(s) Oral every 12 hours  silver sulfADIAZINE 1% Cream 1 Application(s) Topical daily  sodium chloride 0.9% lock flush 10 milliLiter(s) IV Push every 1 hour PRN  zinc sulfate 220 milliGRAM(s) Oral daily      A/P: 77 year old man who first presented to [facility] from [facility] due to unresponsiveness (responded to Narcan). At [facility], found to have subsegmental pulmonary embolism in RUL, rapid atrial flutter with severely reduced LVEF with LV thrombus. Transferred to [facility] on 12/7/22 for LORENZO and possible ablation. At [facility], was found to have left leg ischemia (left leg arterial thrombus on LE duplex on 12/8). Was being considered for rhythm control when he developed abdominal pain, CTA (12/10/22) showed embolus in SMA, bowel infarct, requiring ex-lap on 12/11 with SMA embolectomy, 80cm small bowel resection; On 12/13, underwent 2nd look laparotomy, with 80cm small bowel resection, closure with abthera. On 12/15, underwent 3rd look laparotomy, end-to-end anastomosis of remaining bowel, loop ileostomy and abdominal closure. Now extubated, on tele floor. Eventually underwent LORENZO/DCCV on 12/30/22, now in sinus rhythm. Currently being evaluated for possible above knee amputation for LLE ischemia (possibly deferring till next hospitalization, after optimization of nutritional status)      # Left leg ischemia - Decision re: timing of Above Knee Amputation per vascular surgery and primary team. Optimizing nutritional status prior to surgery     #Suicide Ideation  - Psych follow appreciated and pt was deemed not to be suicidal and pt's 1 to 1 was removed    #Insomnia   - Continue Mirtazapine 15mg po qhs for insomnia    #Transaminitis   - Will continue to trend   -Will f/u Hep B virus PCR    # Acute Systolic Heart Failure   - Continue Metoprolol succinate 25mg qd     # Atrial Flutter   - EP following, now s/p DCCV 12/30. EP recommending uninterrupted AC x 30 days   -Continue enoxaparin 60mg SQ q12  -Continue  amiodarone 100mg qd and metoprolol succinate 25 qd     # Pulmonary embolism (subsegmental RUL)   - After completion of AC for DCCV, would need to continue AC for PE  -Continue lovenox    # Bowel ischemia - s/p SMA embolectomy; Small bowel resection 80 cm + 40 cm; ileostomy in place; mgmt per primary team   - Monitor ileostomy output on loperamide 4mg TID, tincture of opium QID     # Acute blood loss anemia   -Will continue to monitor H/H and maintain an active T&S     # Hyponatremia   -Pt's Na improved to 133 after 500cc bolus   -Will continue to monitor Na    # Severe protein-calorie malnutrition   - agree with nutrition service's assessment ; supplemental nutrition per nutrition recs   - Getting dronabinol 5mg q12 hrs     # Sacral wound - continue off loading, dressings    #GI /DVTPPX  -Continue pantoprazole 40mg PO BID   -Continue lovenox     #DISPO  - As per primary team

## 2023-01-14 NOTE — PROGRESS NOTE ADULT - SUBJECTIVE AND OBJECTIVE BOX
SUBJECTIVE: Patient seen and examined bedside by surgery team. Denies any new complaints this morning. -F/C/CP/SOB/N/V.     aMIOdarone    Tablet 100 milliGRAM(s) Oral every 24 hours  enoxaparin Injectable 60 milliGRAM(s) SubCutaneous every 12 hours  metoprolol succinate ER 25 milliGRAM(s) Oral daily      Vital Signs Last 24 Hrs  T(C): 36.7 (14 Jan 2023 14:00), Max: 37.1 (14 Jan 2023 09:33)  T(F): 98.1 (14 Jan 2023 14:00), Max: 98.7 (14 Jan 2023 09:33)  HR: 98 (14 Jan 2023 15:25) (96 - 108)  BP: 125/80 (14 Jan 2023 15:25) (120/66 - 151/85)  BP(mean): 95 (14 Jan 2023 15:25) (86 - 106)  RR: 18 (14 Jan 2023 15:25) (17 - 18)  SpO2: 99% (14 Jan 2023 15:25) (95% - 100%)    Parameters below as of 14 Jan 2023 15:25  Patient On (Oxygen Delivery Method): room air      I&O's Detail    13 Jan 2023 07:01  -  14 Jan 2023 07:00  --------------------------------------------------------  IN:    Fat Emulsion (Fish Oil &amp; Plant Based) 20% Infusion: 124.8 mL    Fat Emulsion (Fish Oil &amp; Plant Based) 20% Infusion: 208 mL    Lactated Ringers Bolus: 500 mL    Oral Fluid: 420 mL    TPN (Total Parenteral Nutrition): 1150 mL  Total IN: 2402.8 mL    OUT:    Ileostomy (mL): 1900 mL    Voided (mL): 870 mL  Total OUT: 2770 mL    Total NET: -367.2 mL      14 Jan 2023 07:01  -  14 Jan 2023 19:53  --------------------------------------------------------  IN:    Oral Fluid: 300 mL    TPN (Total Parenteral Nutrition): 450 mL  Total IN: 750 mL    OUT:    Ileostomy (mL): 650 mL    Voided (mL): 400 mL  Total OUT: 1050 mL    Total NET: -300 mL          General: NAD, resting comfortably in bed  C/V: NSR  Pulm: Nonlabored breathing, no respiratory distress  Abd: soft, nondistended, nontender. No rebound or guarding. Midline incision healing well without erythema or drainage. Ostomy ppp with gas and stool.   Extrem: WWP, no edema, SCDs in place        LABS:                        10.5   11.16 )-----------( 379      ( 14 Jan 2023 05:30 )             32.7     01-14    133<L>  |  98  |  11  ----------------------------<  135<H>  3.9   |  27  |  0.68    Ca    8.5      14 Jan 2023 05:30  Phos  3.0     01-14  Mg     1.9     01-14    TPro  7.3  /  Alb  2.9<L>  /  TBili  0.6  /  DBili  x   /  AST  35  /  ALT  68<H>  /  AlkPhos  175<H>  01-14          RADIOLOGY & ADDITIONAL STUDIES:

## 2023-01-15 NOTE — PROGRESS NOTE ADULT - SUBJECTIVE AND OBJECTIVE BOX
Patient was seen and examined at bedside. Case discuss with resident. Pt with some nausea and vomiting this morning. Pt denied having abdominal pain.     OBJECTIVE:  Vital Signs Last 24 Hrs  T(C): 37.9 (15 Ladarius 2023 09:00), Max: 38.9 (14 Jan 2023 21:32)  T(F): 100.3 (15 Ladarius 2023 09:00), Max: 102.1 (14 Jan 2023 21:32)  HR: 102 (15 Ladarius 2023 13:17) (98 - 122)  BP: 149/86 (15 Ladarius 2023 13:17) (115/78 - 149/86)  BP(mean): 109 (15 Ladarius 2023 13:17) (92 - 114)  RR: 20 (15 Ladarius 2023 13:17) (16 - 20)  SpO2: 96% (15 Ladarius 2023 13:17) (96% - 100%)    Parameters below as of 15 Ladarius 2023 13:17  Patient On (Oxygen Delivery Method): room air      PHYSICAL EXAM:  Gen: NAD laying in bed; appears uncomfortable with a small amount of orange emesis in basin   CV: RRR, +S1/S2, no mumur  Pulm: CTA b/l no wheezing or crackles   Abd: soft, NTND + BS no rebound or guarding   Left leg bandage C/D/I         LABS:                        11.9   13.11 )-----------( 391      ( 15 Ladarius 2023 10:02 )             36.3     01-15    136  |  101  |  22  ----------------------------<  173<H>  3.8   |  22  |  0.87    Ca    9.3      15 Ladarius 2023 10:02  Phos  3.8     01-15  Mg     2.1     01-15    TPro  8.5<H>  /  Alb  3.2<L>  /  TBili  0.9  /  DBili  x   /  AST  38  /  ALT  62<H>  /  AlkPhos  213<H>  01-15    LIVER FUNCTIONS - ( 15 Ladarius 2023 10:02 )  Alb: 3.2 g/dL / Pro: 8.5 g/dL / ALK PHOS: 213 U/L / ALT: 62 U/L / AST: 38 U/L / GGT: x             CAPILLARY BLOOD GLUCOSE      POCT Blood Glucose.: 124 mg/dL (15 Ladarius 2023 00:16)    Urinalysis Basic - ( 15 Ladarius 2023 03:43 )    Color: Yellow / Appearance: Clear / SG: >=1.030 / pH: x  Gluc: x / Ketone: Trace mg/dL  / Bili: Negative / Urobili: 0.2 E.U./dL   Blood: x / Protein: 30 mg/dL / Nitrite: NEGATIVE   Leuk Esterase: NEGATIVE / RBC: < 5 /HPF / WBC 5-10 /HPF   Sq Epi: x / Non Sq Epi: Moderate /HPF / Bacteria: Many /HPF    acetaminophen     Tablet .. 650 milliGRAM(s) Oral every 6 hours PRN  aMIOdarone    Tablet 100 milliGRAM(s) Oral every 24 hours  ascorbic acid 500 milliGRAM(s) Oral daily  chlorhexidine 2% Cloths 1 Application(s) Topical <User Schedule>  chlorhexidine 2% Cloths 1 Application(s) Topical <User Schedule>  dextrose 5%. 1000 milliLiter(s) IV Continuous <Continuous>  dextrose 5%. 1000 milliLiter(s) IV Continuous <Continuous>  dextrose Oral Gel 15 Gram(s) Oral once PRN  dronabinol 5 milliGRAM(s) Oral every 12 hours  enoxaparin Injectable 60 milliGRAM(s) SubCutaneous every 12 hours  fat emulsion (Fish Oil and Plant Based) 20% Infusion 0.31 Gm/kG/Day IV Continuous <Continuous>  glucagon  Injectable 1 milliGRAM(s) IntraMuscular once  influenza  Vaccine (HIGH DOSE) 0.7 milliLiter(s) IntraMuscular once  insulin lispro (ADMELOG) corrective regimen sliding scale   SubCutaneous every 6 hours  metoprolol succinate ER 25 milliGRAM(s) Oral daily  mirtazapine 15 milliGRAM(s) Oral at bedtime  multivitamin 1 Tablet(s) Oral daily  ondansetron Injectable 4 milliGRAM(s) IV Push every 6 hours PRN  oxyCODONE    IR 5 milliGRAM(s) Oral every 4 hours PRN  pantoprazole    Tablet 40 milliGRAM(s) Oral two times a day  Parenteral Nutrition - Adult 1 Each TPN Continuous <Continuous>  Parenteral Nutrition - Adult 1 Each TPN Continuous <Continuous>  silver sulfADIAZINE 1% Cream 1 Application(s) Topical daily  sodium chloride 0.9% lock flush 10 milliLiter(s) IV Push every 1 hour PRN  zinc sulfate 220 milliGRAM(s) Oral daily      A/P: 77 year old man who first presented to [facility] from [facility] due to unresponsiveness (responded to Narcan). At [facility], found to have subsegmental pulmonary embolism in RUL, rapid atrial flutter with severely reduced LVEF with LV thrombus. Transferred to [facility] on 12/7/22 for LORENZO and possible ablation. At [facility], was found to have left leg ischemia (left leg arterial thrombus on LE duplex on 12/8). Was being considered for rhythm control when he developed abdominal pain, CTA (12/10/22) showed embolus in SMA, bowel infarct, requiring ex-lap on 12/11 with SMA embolectomy, 80cm small bowel resection; On 12/13, underwent 2nd look laparotomy, with 80cm small bowel resection, closure with abthera. On 12/15, underwent 3rd look laparotomy, end-to-end anastomosis of remaining bowel, loop ileostomy and abdominal closure. Now extubated, on tele floor. Eventually underwent LORENZO/DCCV on 12/30/22, now in sinus rhythm. Currently being evaluated for possible above knee amputation for LLE ischemia (possibly deferring till next hospitalization, after optimization of nutritional status)      # Left leg ischemia - Decision re: timing of Above Knee Amputation per vascular surgery and primary team. Optimizing nutritional status prior to surgery     #Nausea and vomiting  -Continue antiemetic prn  -Serial abdominal exam     #Suicide Ideation  - Psych follow appreciated and pt was deemed not to be suicidal and pt's 1 to 1 was removed    #Insomnia   - Continue Mirtazapine 15mg po qhs for insomnia    #Transaminitis   - Will continue to trend   -Will f/u Hep B virus PCR    # Acute Systolic Heart Failure   - Continue Metoprolol succinate 25mg qd     # Atrial Flutter   - EP following, now s/p DCCV 12/30. EP recommending uninterrupted AC x 30 days   -Continue enoxaparin 60mg SQ q12  -Continue  amiodarone 100mg qd and metoprolol succinate 25 qd     # Pulmonary embolism (subsegmental RUL)   - After completion of AC for DCCV, would need to continue AC for PE  -Continue lovenox    # Bowel ischemia - s/p SMA embolectomy; Small bowel resection 80 cm + 40 cm; ileostomy in place; mgmt per primary team   - Monitor ileostomy output on loperamide 4mg TID, tincture of opium QID     # Acute blood loss anemia   -Will continue to monitor H/H and maintain an active T&S     # Hyponatremia   -Pt's Na improved to136  -Will continue to monitor Na    # Severe protein-calorie malnutrition   - agree with nutrition service's assessment ; supplemental nutrition per nutrition recs   - Getting dronabinol 5mg q12 hrs     # Sacral wound   - continue off loading, dressings    #GI /DVT PPX  -Continue pantoprazole 40mg PO BID   -Continue lovenox     #DISPO  - As per primary team                    Patient was seen and examined at bedside. Case discuss with resident. Pt with some nausea and vomiting this morning. Pt denied having abdominal pain.     OBJECTIVE:  Vital Signs Last 24 Hrs  T(C): 37.9 (15 Ladarius 2023 09:00), Max: 38.9 (14 Jan 2023 21:32)  T(F): 100.3 (15 Ladarius 2023 09:00), Max: 102.1 (14 Jan 2023 21:32)  HR: 102 (15 Ladarius 2023 13:17) (98 - 122)  BP: 149/86 (15 Ladarius 2023 13:17) (115/78 - 149/86)  BP(mean): 109 (15 Ladarius 2023 13:17) (92 - 114)  RR: 20 (15 Ladarius 2023 13:17) (16 - 20)  SpO2: 96% (15 Ladarius 2023 13:17) (96% - 100%)    Parameters below as of 15 Ladarius 2023 13:17  Patient On (Oxygen Delivery Method): room air      PHYSICAL EXAM:  Gen: NAD laying in bed; appears uncomfortable with a small amount of orange emesis in basin   CV: RRR, +S1/S2, no mumur  Pulm: CTA b/l no wheezing or crackles   Abd: soft, NTND + BS no rebound or guarding   Left leg bandage C/D/I         LABS:                        11.9   13.11 )-----------( 391      ( 15 Ladarius 2023 10:02 )             36.3     01-15    136  |  101  |  22  ----------------------------<  173<H>  3.8   |  22  |  0.87    Ca    9.3      15 Ladarius 2023 10:02  Phos  3.8     01-15  Mg     2.1     01-15    TPro  8.5<H>  /  Alb  3.2<L>  /  TBili  0.9  /  DBili  x   /  AST  38  /  ALT  62<H>  /  AlkPhos  213<H>  01-15    LIVER FUNCTIONS - ( 15 Ladarius 2023 10:02 )  Alb: 3.2 g/dL / Pro: 8.5 g/dL / ALK PHOS: 213 U/L / ALT: 62 U/L / AST: 38 U/L / GGT: x             CAPILLARY BLOOD GLUCOSE      POCT Blood Glucose.: 124 mg/dL (15 Ladarius 2023 00:16)    Urinalysis Basic - ( 15 Ladarius 2023 03:43 )    Color: Yellow / Appearance: Clear / SG: >=1.030 / pH: x  Gluc: x / Ketone: Trace mg/dL  / Bili: Negative / Urobili: 0.2 E.U./dL   Blood: x / Protein: 30 mg/dL / Nitrite: NEGATIVE   Leuk Esterase: NEGATIVE / RBC: < 5 /HPF / WBC 5-10 /HPF   Sq Epi: x / Non Sq Epi: Moderate /HPF / Bacteria: Many /HPF    acetaminophen     Tablet .. 650 milliGRAM(s) Oral every 6 hours PRN  aMIOdarone    Tablet 100 milliGRAM(s) Oral every 24 hours  ascorbic acid 500 milliGRAM(s) Oral daily  chlorhexidine 2% Cloths 1 Application(s) Topical <User Schedule>  chlorhexidine 2% Cloths 1 Application(s) Topical <User Schedule>  dextrose 5%. 1000 milliLiter(s) IV Continuous <Continuous>  dextrose 5%. 1000 milliLiter(s) IV Continuous <Continuous>  dextrose Oral Gel 15 Gram(s) Oral once PRN  dronabinol 5 milliGRAM(s) Oral every 12 hours  enoxaparin Injectable 60 milliGRAM(s) SubCutaneous every 12 hours  fat emulsion (Fish Oil and Plant Based) 20% Infusion 0.31 Gm/kG/Day IV Continuous <Continuous>  glucagon  Injectable 1 milliGRAM(s) IntraMuscular once  influenza  Vaccine (HIGH DOSE) 0.7 milliLiter(s) IntraMuscular once  insulin lispro (ADMELOG) corrective regimen sliding scale   SubCutaneous every 6 hours  metoprolol succinate ER 25 milliGRAM(s) Oral daily  mirtazapine 15 milliGRAM(s) Oral at bedtime  multivitamin 1 Tablet(s) Oral daily  ondansetron Injectable 4 milliGRAM(s) IV Push every 6 hours PRN  oxyCODONE    IR 5 milliGRAM(s) Oral every 4 hours PRN  pantoprazole    Tablet 40 milliGRAM(s) Oral two times a day  Parenteral Nutrition - Adult 1 Each TPN Continuous <Continuous>  Parenteral Nutrition - Adult 1 Each TPN Continuous <Continuous>  silver sulfADIAZINE 1% Cream 1 Application(s) Topical daily  sodium chloride 0.9% lock flush 10 milliLiter(s) IV Push every 1 hour PRN  zinc sulfate 220 milliGRAM(s) Oral daily      A/P: 77 year old man who first presented to [facility] from [facility] due to unresponsiveness (responded to Narcan). At [facility], found to have subsegmental pulmonary embolism in RUL, rapid atrial flutter with severely reduced LVEF with LV thrombus. Transferred to [facility] on 12/7/22 for LORENZO and possible ablation. At [facility], was found to have left leg ischemia (left leg arterial thrombus on LE duplex on 12/8). Was being considered for rhythm control when he developed abdominal pain, CTA (12/10/22) showed embolus in SMA, bowel infarct, requiring ex-lap on 12/11 with SMA embolectomy, 80cm small bowel resection; On 12/13, underwent 2nd look laparotomy, with 80cm small bowel resection, closure with abthera. On 12/15, underwent 3rd look laparotomy, end-to-end anastomosis of remaining bowel, loop ileostomy and abdominal closure. Now extubated, on tele floor. Eventually underwent LORENZO/DCCV on 12/30/22, now in sinus rhythm. Currently being evaluated for possible above knee amputation for LLE ischemia (possibly deferring till next hospitalization, after optimization of nutritional status)      # Left leg ischemia - Decision re: timing of Above Knee Amputation per vascular surgery and primary team. Optimizing nutritional status prior to surgery     #Nausea and vomiting  -Continue antiemetic prn  -Serial abdominal exam     #Suicide Ideation  - Psych follow up  appreciated and pt was deemed not to be suicidal and pt's 1 to 1 was removed    #Insomnia   - Continue Mirtazapine 15mg po qhs for insomnia    #Transaminitis   -Pt increase in Alk phos today   - Will continue to trend   -Will f/u Hep B virus PCR    # Acute Systolic Heart Failure   - Continue Metoprolol succinate 25mg qd     # Atrial Flutter   - EP following, now s/p DCCV 12/30. EP recommending uninterrupted AC x 30 days   -Continue enoxaparin 60mg SQ q12  -Continue  amiodarone 100mg qd and metoprolol succinate 25 qd     # Pulmonary embolism (subsegmental RUL)   - After completion of AC for DCCV, would need to continue AC for PE  -Continue lovenox    # Bowel ischemia - s/p SMA embolectomy; Small bowel resection 80 cm + 40 cm; ileostomy in place; mgmt per primary team   - Monitor ileostomy output on loperamide 4mg TID, tincture of opium QID     # Hyponatremia   -Pt's Na improved to136  -Will continue to monitor Na    # Severe protein-calorie malnutrition   - agree with nutrition service's assessment ; supplemental nutrition per nutrition recs   - Getting dronabinol 5mg q12 hrs     # Sacral wound   - continue off loading, dressings    #GI /DVT PPX  -Continue pantoprazole 40mg PO BID   -Continue lovenox     #DISPO  - As per primary team

## 2023-01-15 NOTE — CHART NOTE - NSCHARTNOTEFT_GEN_A_CORE
NGT refusal. At 2PM, attempted to place NGT for gastric decompression however patient refusing. Explained to the patient that his stomach is markedly dilated on CT scan which explains his emesis. Also explained to patient that decompression will likely help prevent further emesis. Explained risk of gastric ischemia 2/2 distension and possible resulting necrosis. Patient expressed understanding but still refusing. However, patient agrees to NGT if he has another episode of emesis.

## 2023-01-15 NOTE — PROGRESS NOTE ADULT - ASSESSMENT
77y y/o Male with significant PMHx of IVDU found unresponsive at his nursing home, resolved after Narcan in the field, and brought to [facility]. Found to have PE, atrial flutter, and large LV thrombus on echo. Transferred to [facility] for further management. Pt C/o abdominal pain on 12/10 CTA showing mid SMA with embolus. Abnormal distal small bowel loops and cecum with dilatation and pneumatosis suggesting infarcted bowel. One or two tiny foci of  intrahepatic portal vein pneumatosis. Segmental infarction upper pole left kidney. Now s/p Ex lap, SMA embolectomy, 80cm SBR, abthera vac left in discontinuity (12/11) and transferred to SICU intubated. S/p second look (12/13) and most recently s/p OR for 3rd look, end-to-end anastomosis of remaining bowel, loop ileostomy and abdomen closure (12/15). Now stepped down with limb ischemia to LLE pending amputation. Ischemia has not yet fully demarcated, it is clear that the posterior calf (which is needed to heal a BKA flap) is involved and therefore only surgical option available to the patient is an AKA pending medical optimization.     Recommendations:    - If clinically feasible from primary team perspective, patient okay to be discharged, no vascular surgery intervention precluding discharge  - Patient can represent at another time when nutritional status is optimized for elective amputation  - Continue local wound care with betadine to all skin below the line of demarcation of his left shin and kerlix. No offloading boot, keep left heel elevated off bed.   - Continue supportive measures  - Rest of care per primary team   - Vascular surgery Team 3C will continue to follow. Please call [phone] with any questions or concerns   77y y/o Male with significant PMHx of IVDU found unresponsive at his nursing home, resolved after Narcan in the field, and brought to [facility]. Found to have PE, atrial flutter, and large LV thrombus on echo. Transferred to [facility] for further management. Pt C/o abdominal pain on 12/10 CTA showing mid SMA with embolus. Abnormal distal small bowel loops and cecum with dilatation and pneumatosis suggesting infarcted bowel. One or two tiny foci of  intrahepatic portal vein pneumatosis. Segmental infarction upper pole left kidney. Now s/p Ex lap, SMA embolectomy, 80cm SBR, abthera vac left in discontinuity (12/11) and transferred to SICU intubated. S/p second look (12/13) and most recently s/p OR for 3rd look, end-to-end anastomosis of remaining bowel, loop ileostomy and abdomen closure (12/15). Now stepped down with limb ischemia to LLE pending amputation. Ischemia has not yet fully demarcated, it is clear that the posterior calf (which is needed to heal a BKA flap) is involved and therefore only surgical option available to the patient is an AKA pending medical optimization.     Recommendations:  - If clinically feasible from primary team perspective, patient okay to be discharged, no vascular surgery intervention precluding discharge  - Patient can represent at another time when nutritional status is optimized for elective amputation  - Continue local wound care with betadine to all skin below the line of demarcation of his left shin and kerlix. No offloading boot, keep left heel elevated off bed.   - Continue supportive measures  - Rest of care per primary team   - Vascular surgery Team 3C will continue to follow. Please call [phone] with any questions or concerns

## 2023-01-15 NOTE — PROGRESS NOTE ADULT - SUBJECTIVE AND OBJECTIVE BOX
Vascular Surgery Progress Note    Subjective:   Patient seen and examined at bedside.     ROS:   Denies Headache, blurred vision, Chest Pain, SOB, Abdominal pain, nausea or vomiting     Social   aMIOdarone    Tablet 100  enoxaparin Injectable 60  metoprolol succinate ER 25      Allergies    No Known Allergies    Intolerances        Vital Signs Last 24 Hrs  T(C): 38.1 (15 Ladarius 2023 16:00), Max: 38.9 (14 Jan 2023 21:32)  T(F): 100.6 (15 Ladarius 2023 16:00), Max: 102.1 (14 Jan 2023 21:32)  HR: 104 (15 Ladarius 2023 15:50) (102 - 122)  BP: 125/81 (15 Ladarius 2023 15:50) (115/78 - 149/86)  BP(mean): 95 (15 Ladarius 2023 15:50) (92 - 114)  RR: 22 (15 Ladarius 2023 15:50) (16 - 22)  SpO2: 96% (15 Ladarius 2023 15:50) (96% - 100%)    Parameters below as of 15 Ladarius 2023 15:50  Patient On (Oxygen Delivery Method): room air      I&O's Summary    14 Jan 2023 07:01  -  15 Ladarius 2023 07:00  --------------------------------------------------------  IN: 1445.6 mL / OUT: 3100 mL / NET: -1654.4 mL    15 Ladarius 2023 07:01  -  15 Ladarius 2023 18:04  --------------------------------------------------------  IN: 1182.4 mL / OUT: 1725 mL / NET: -542.6 mL        Physical Exam:  General:  Pulmonary:  Cardiovascular:  Abdominal:  Extremities:  Pulses:   Right:                                                                          Left:  FEM [ ]2+ [ ]1+ [ ]doppler                                             FEM [ ]2+ [ ]1+ [ ]doppler    POP [ ]2+ [ ]1+ [ ]doppler                                             POP [ ]2+ [ ]1+ [ ]doppler    DP [ ]2+ [ ]1+ [ ]doppler                                                DP [ ]2+ [ ]1+ [ ]doppler  PT[ ]2+ [ ]1+ [ ]doppler                                                  PT [ ]2+ [ ]1+ [ ]doppler      LABS:                        11.9   13.11 )-----------( 391      ( 15 Ladarius 2023 10:02 )             36.3     01-15    136  |  101  |  22  ----------------------------<  173<H>  3.8   |  22  |  0.87    Ca    9.3      15 Ladarius 2023 10:02  Phos  3.8     01-15  Mg     2.1     01-15    TPro  8.5<H>  /  Alb  3.2<L>  /  TBili  0.9  /  DBili  x   /  AST  38  /  ALT  62<H>  /  AlkPhos  213<H>  01-15          A/P: 77y YO Male      Vascular/PAD:      HTN/HLD:    CAD/CHF:     DM:    CKD:     Anemia:     ID:    Diet:     Activity:     DVTPPx:     Dispo:      Vascular Surgery Progress Note    Subjective:   Patient seen and examined at bedside. Patient denies pain in LLE.     ROS:   Denies Headache, blurred vision, Chest Pain, SOB, Abdominal pain, nausea or vomiting     Social   aMIOdarone    Tablet 100  enoxaparin Injectable 60  metoprolol succinate ER 25      Allergies    No Known Allergies    Intolerances        Vital Signs Last 24 Hrs  T(C): 38.1 (15 Ladarius 2023 16:00), Max: 38.9 (14 Jan 2023 21:32)  T(F): 100.6 (15 Ladarius 2023 16:00), Max: 102.1 (14 Jan 2023 21:32)  HR: 104 (15 Ladarius 2023 15:50) (102 - 122)  BP: 125/81 (15 Ladarius 2023 15:50) (115/78 - 149/86)  BP(mean): 95 (15 Ladarius 2023 15:50) (92 - 114)  RR: 22 (15 Ladarius 2023 15:50) (16 - 22)  SpO2: 96% (15 Ladarius 2023 15:50) (96% - 100%)    Parameters below as of 15 Ladarius 2023 15:50  Patient On (Oxygen Delivery Method): room air      I&O's Summary    14 Jan 2023 07:01  -  15 Ladarius 2023 07:00  --------------------------------------------------------  IN: 1445.6 mL / OUT: 3100 mL / NET: -1654.4 mL    15 Ladarius 2023 07:01  -  15 Ladarius 2023 18:04  --------------------------------------------------------  IN: 1182.4 mL / OUT: 1725 mL / NET: -542.6 mL        Physical Exam:  General:  Pulmonary:  Cardiovascular:  Abdominal:  Extremities:  Pulses:       LABS:                        11.9   13.11 )-----------( 391      ( 15 Ladarius 2023 10:02 )             36.3     01-15    136  |  101  |  22  ----------------------------<  173<H>  3.8   |  22  |  0.87    Ca    9.3      15 Ladarius 2023 10:02  Phos  3.8     01-15  Mg     2.1     01-15    TPro  8.5<H>  /  Alb  3.2<L>  /  TBili  0.9  /  DBili  x   /  AST  38  /  ALT  62<H>  /  AlkPhos  213<H>  01-15     Vascular Surgery Progress Note    Subjective:   Patient seen and examined at bedside. Patient noted to have a Tmax of 102.1 today. Patient minimally interactive with subjective interview. Patient denies pain in LLE or abdominal pain. States he has been nauseous today and tolerating minimal PO intake.     ROS:   Denies Headache, blurred vision, Chest Pain, SOB, Abdominal pain, nausea or vomiting     Social   aMIOdarone    Tablet 100  enoxaparin Injectable 60  metoprolol succinate ER 25      Allergies    No Known Allergies    Intolerances        Vital Signs Last 24 Hrs  T(C): 38.1 (15 Ladarius 2023 16:00), Max: 38.9 (14 Jan 2023 21:32)  T(F): 100.6 (15 Ladarius 2023 16:00), Max: 102.1 (14 Jan 2023 21:32)  HR: 104 (15 Ladarius 2023 15:50) (102 - 122)  BP: 125/81 (15 Ladarius 2023 15:50) (115/78 - 149/86)  BP(mean): 95 (15 Ladarius 2023 15:50) (92 - 114)  RR: 22 (15 Ladarius 2023 15:50) (16 - 22)  SpO2: 96% (15 Ladarius 2023 15:50) (96% - 100%)    Parameters below as of 15 Ladarius 2023 15:50  Patient On (Oxygen Delivery Method): room air      I&O's Summary    14 Jan 2023 07:01  -  15 Ladarius 2023 07:00  --------------------------------------------------------  IN: 1445.6 mL / OUT: 3100 mL / NET: -1654.4 mL    15 Ladarius 2023 07:01  -  15 Ladarius 2023 18:04  --------------------------------------------------------  IN: 1182.4 mL / OUT: 1725 mL / NET: -542.6 mL        Physical Exam:  General: Ill-appearing man, resting in bed, NAD  C/V: NSR  Pulm: Nonlabored breathing, no respiratory distress  Abd: soft, nondistended, nontender. No rebound or guarding. Midline incision healing well without erythema or drainage. Ostomy noted in RLQ to be pink and patent with stool and gas in   Extrem: L LE with dry gangrene, stable from prior exam, extending from toes to just below the knee      LABS:                        11.9   13.11 )-----------( 391      ( 15 Ladarius 2023 10:02 )             36.3     01-15    136  |  101  |  22  ----------------------------<  173<H>  3.8   |  22  |  0.87    Ca    9.3      15 Ladarius 2023 10:02  Phos  3.8     01-15  Mg     2.1     01-15    TPro  8.5<H>  /  Alb  3.2<L>  /  TBili  0.9  /  DBili  x   /  AST  38  /  ALT  62<H>  /  AlkPhos  213<H>  01-15

## 2023-01-15 NOTE — PROGRESS NOTE ADULT - ASSESSMENT
77M with PMHx of IVDU found unresponsive at his nursing home, resolved after Narcan in the field and brought to [facility]. Found to have PE, atrial flutter, and large LV thrombus on echo. Transferred to [facility] for further management. Pt c/o abdominal pain on 12/10 CTA showing mid SMA with embolus. Abnormal distal small bowel loops and cecum with dilatation and pneumatosis suggesting infarcted bowel. One or two tiny foci of intrahepatic portal vein pneumatosis. Segmental infarction upper pole left kidney. Now s/p Ex lap, SMA embolectomy, 80cm SBR, abthera vac left in discontinuity (12/11) and transferred to SICU intubated. S/p second look (12/13) and most recently s/p OR for 3rd look, end-to-end anastomosis of remaining bowel, loop ileostomy and abdomen closure (12/15). Remains in SICU, now extubated with still with limb ischemia to LLE pending amputation. Now stepped down to tele.     Reg/low fiber diet + ensures, TPN  Pain/nausea control  CTAP with PO and IV contrast  Imodium, metamucin, tincture of opium for high ileostomy output  A flutter now s/p cardioversion, amio 100 QD, toprolol XL 25 QD  LV thrombus w LLE limb ischemia- Heparin gtt changed to SQL BID  Planning for AKA with vascular surgery  AM labs

## 2023-01-15 NOTE — PROGRESS NOTE ADULT - SUBJECTIVE AND OBJECTIVE BOX
INTERVAL HPI/OVERNIGHT EVENTS:    STATUS POST:      POST OPERATIVE DAY #:     SUBJECTIVE:      aMIOdarone    Tablet 100 milliGRAM(s) Oral every 24 hours  enoxaparin Injectable 60 milliGRAM(s) SubCutaneous every 12 hours  metoprolol succinate ER 25 milliGRAM(s) Oral daily      Vital Signs Last 24 Hrs  T(C): 37.6 (15 Ladarius 2023 04:41), Max: 38.9 (14 Jan 2023 21:32)  T(F): 99.7 (15 Ladarius 2023 04:41), Max: 102.1 (14 Jan 2023 21:32)  HR: 122 (15 Ladarius 2023 03:40) (96 - 122)  BP: 141/83 (15 Ladarius 2023 03:40) (115/78 - 141/83)  BP(mean): 99 (15 Ladarius 2023 03:40) (86 - 101)  RR: 18 (15 Ladarius 2023 03:40) (16 - 18)  SpO2: 100% (15 Ladarius 2023 03:40) (95% - 100%)    Parameters below as of 15 Ladarius 2023 03:40  Patient On (Oxygen Delivery Method): room air      I&O's Detail    13 Jan 2023 07:01  -  14 Jan 2023 07:00  --------------------------------------------------------  IN:    Fat Emulsion (Fish Oil &amp; Plant Based) 20% Infusion: 124.8 mL    Fat Emulsion (Fish Oil &amp; Plant Based) 20% Infusion: 208 mL    Lactated Ringers Bolus: 500 mL    Oral Fluid: 420 mL    TPN (Total Parenteral Nutrition): 1150 mL  Total IN: 2402.8 mL    OUT:    Ileostomy (mL): 1900 mL    Voided (mL): 870 mL  Total OUT: 2770 mL    Total NET: -367.2 mL      14 Jan 2023 07:01  -  15 Ladarius 2023 06:35  --------------------------------------------------------  IN:    Fat Emulsion (Fish Oil &amp; Plant Based) 20% Infusion: 145.6 mL    Oral Fluid: 300 mL    TPN (Total Parenteral Nutrition): 1000 mL  Total IN: 1445.6 mL    OUT:    Emesis (mL): 450 mL    Ileostomy (mL): 2050 mL    Voided (mL): 600 mL  Total OUT: 3100 mL    Total NET: -1654.4 mL          General: NAD, resting comfortably in bed  C/V: NSR  Pulm: Nonlabored breathing, no respiratory distress  Abd: soft, NT/ND.  Extrem: WWP, no edema, SCDs in place  Drains:  Uriarte:      LABS:                        11.4   12.58 )-----------( 384      ( 15 Ladarius 2023 00:13 )             36.0     01-15    137  |  101  |  17  ----------------------------<  128<H>  4.2   |  26  |  0.76    Ca    9.2      15 Ladarius 2023 00:13  Phos  3.5     01-15  Mg     2.0     01-15    TPro  7.3  /  Alb  2.9<L>  /  TBili  0.6  /  DBili  x   /  AST  35  /  ALT  68<H>  /  AlkPhos  175<H>  01-14      Urinalysis Basic - ( 15 Ladarius 2023 03:43 )    Color: Yellow / Appearance: Clear / SG: >=1.030 / pH: x  Gluc: x / Ketone: Trace mg/dL  / Bili: Negative / Urobili: 0.2 E.U./dL   Blood: x / Protein: 30 mg/dL / Nitrite: NEGATIVE   Leuk Esterase: NEGATIVE / RBC: < 5 /HPF / WBC 5-10 /HPF   Sq Epi: x / Non Sq Epi: Moderate /HPF / Bacteria: Many /HPF        RADIOLOGY & ADDITIONAL STUDIES:   INTERVAL HPI/OVERNIGHT EVENTS: Febrile to 102. CXR wnl. UA/UCx, BCx sent. Repeat labs stable, WBC 12.58 (11.16). 1 episode of NBNB emesis 400cc, nausea, tachy to 122. Abd X-ray.    SUBJECTIVE: Patient seen and examined at bedside with chief. Continues to c/o nausea and NBNB emesis, denies abdominal pain, cp, sob.      aMIOdarone    Tablet 100 milliGRAM(s) Oral every 24 hours  enoxaparin Injectable 60 milliGRAM(s) SubCutaneous every 12 hours  metoprolol succinate ER 25 milliGRAM(s) Oral daily      Vital Signs Last 24 Hrs  T(C): 37.6 (15 Ladarius 2023 04:41), Max: 38.9 (14 Jan 2023 21:32)  T(F): 99.7 (15 Ladarius 2023 04:41), Max: 102.1 (14 Jan 2023 21:32)  HR: 122 (15 Ladarius 2023 03:40) (96 - 122)  BP: 141/83 (15 Ladarius 2023 03:40) (115/78 - 141/83)  BP(mean): 99 (15 Ladarius 2023 03:40) (86 - 101)  RR: 18 (15 Ladarius 2023 03:40) (16 - 18)  SpO2: 100% (15 Ladarius 2023 03:40) (95% - 100%)    Parameters below as of 15 Ladarius 2023 03:40  Patient On (Oxygen Delivery Method): room air      I&O's Detail    13 Jan 2023 07:01  -  14 Jan 2023 07:00  --------------------------------------------------------  IN:    Fat Emulsion (Fish Oil &amp; Plant Based) 20% Infusion: 124.8 mL    Fat Emulsion (Fish Oil &amp; Plant Based) 20% Infusion: 208 mL    Lactated Ringers Bolus: 500 mL    Oral Fluid: 420 mL    TPN (Total Parenteral Nutrition): 1150 mL  Total IN: 2402.8 mL    OUT:    Ileostomy (mL): 1900 mL    Voided (mL): 870 mL  Total OUT: 2770 mL    Total NET: -367.2 mL      14 Jan 2023 07:01  -  15 Ladarius 2023 06:35  --------------------------------------------------------  IN:    Fat Emulsion (Fish Oil &amp; Plant Based) 20% Infusion: 145.6 mL    Oral Fluid: 300 mL    TPN (Total Parenteral Nutrition): 1000 mL  Total IN: 1445.6 mL    OUT:    Emesis (mL): 450 mL    Ileostomy (mL): 2050 mL    Voided (mL): 600 mL  Total OUT: 3100 mL    Total NET: -1654.4 mL          General: NAD, resting comfortably in bed  C/V: NSR  Pulm: Nonlabored breathing, no respiratory distress  Abd: soft, NT/ND. Ostomy ppp with gas and stool and pouch.   Extrem: WWP, no edema, SCDs in place        LABS:                        11.4   12.58 )-----------( 384      ( 15 Ladarius 2023 00:13 )             36.0     01-15    137  |  101  |  17  ----------------------------<  128<H>  4.2   |  26  |  0.76    Ca    9.2      15 Ladarius 2023 00:13  Phos  3.5     01-15  Mg     2.0     01-15    TPro  7.3  /  Alb  2.9<L>  /  TBili  0.6  /  DBili  x   /  AST  35  /  ALT  68<H>  /  AlkPhos  175<H>  01-14      Urinalysis Basic - ( 15 Ladarius 2023 03:43 )    Color: Yellow / Appearance: Clear / SG: >=1.030 / pH: x  Gluc: x / Ketone: Trace mg/dL  / Bili: Negative / Urobili: 0.2 E.U./dL   Blood: x / Protein: 30 mg/dL / Nitrite: NEGATIVE   Leuk Esterase: NEGATIVE / RBC: < 5 /HPF / WBC 5-10 /HPF   Sq Epi: x / Non Sq Epi: Moderate /HPF / Bacteria: Many /HPF        RADIOLOGY & ADDITIONAL STUDIES:

## 2023-01-15 NOTE — PROVIDER CONTACT NOTE (OTHER) - ASSESSMENT
, /83, RR 18, SpO2 100%, no c/o pain - vomit appeared in a similar manner to ostomy putout - liquid, yellow/orange in color - pt stated earlier he felt an "abdominal fullness", was sleeping, then shot up and vomited

## 2023-01-16 NOTE — BH CONSULTATION LIAISON PROGRESS NOTE - NSBHASSESSMENTFT_PSY_ALL_CORE
77 year old male with history of substance use and no significant past medical history (poor medical follow up, last PCP visit 20 years prior to admission), presenting with unresponsiveness from nursing home to [facility], found to be in Aflutter w RVR with subsegmental PE RUL, transferred to [facility], found to have LV thrombus and worsening HF, EF 10-15%. 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 Course further complicated by LLE pulselessness and ulcerations that will ultimately require BKA. Psychiatry consulted due to depressed mood and suicidality.     The patient presents as depressed, verbalizing hopelessness and non-specific active suicidal ideation, while denying any suicidal plan or intent. Some challenges initiating sleep as well as health-related worry. No evidence of tyler, internal response to stimuli, or other psychotic phenomena. At the present time the patient does not appear to be at imminent risk of harm to self. No aggressive ideation/behavior noted or reported.     01/16/2023: The patient was reassessed today after primary team reported resumption of SI. The patient presents similarily to prior presentations. He reports continuing pain that is only partially controlled, he reports difficulty with eating and keeping food down without vomiting, he also endorses poor sleep in the context of noise on the unit. He endorses passive SI but no active SI in the context of multiple medical problems, pain, and difficulty sleeping. The patient at this time is not an imminent risk to himself but has multiple risk factors for suicide.     :::Recommendations:::  Plan:  - Continue enhanced supervision  - When medically possible, group medical visits/interventions together such that the patient has extended periods of uninterrupted sleep. Impaired sleep has a direct relationship with the development of irritability and mood disorders. See about decreasing noise in his room as well at night.  - The patient greatly enjoys playing chess; a chess board is available through Patient Experience (4th Floor [facility]); the patient is aware that he can request the board be brought.  - patient reports experiencing intermittently controlled pain, chronic pain is a risk factor for suicide, recommend optimizing pain control per primary team.  -Psychology will follow up for individual psychotherapy  - CL to follow up tomorrow.   - No psychiatric contraindications to discharge

## 2023-01-16 NOTE — PROGRESS NOTE ADULT - SUBJECTIVE AND OBJECTIVE BOX
SUBJECTIVE: Pt seen and examined by chief resident. Pt is doing well, resting comfortably on bed. Patrick abdominal pain. No further emesis overnight. Denies nasuea.  No complaints at this time.    Vital Signs Last 24 Hrs  T(C): 36.5 (16 Jan 2023 05:00), Max: 38.1 (15 Ladarius 2023 16:00)  T(F): 97.7 (16 Jan 2023 05:00), Max: 100.6 (15 Ladarius 2023 16:00)  HR: 94 (16 Jan 2023 03:45) (90 - 122)  BP: 135/77 (16 Jan 2023 03:45) (125/81 - 149/86)  BP(mean): 94 (16 Jan 2023 03:45) (93 - 114)  RR: 17 (16 Jan 2023 03:45) (16 - 22)  SpO2: 97% (16 Jan 2023 03:45) (96% - 100%)    Parameters below as of 16 Jan 2023 03:45  Patient On (Oxygen Delivery Method): room air        I&O's Summary    14 Jan 2023 07:01  -  15 Ladarius 2023 07:00  --------------------------------------------------------  IN: 1445.6 mL / OUT: 3100 mL / NET: -1654.4 mL    15 Ladarius 2023 07:01  -  16 Jan 2023 06:55  --------------------------------------------------------  IN: 3148.8 mL / OUT: 3425 mL / NET: -276.2 mL        Physical Exam:  General Appearance: Appears well, NAD  Pulmonary: Nonlabored breathing, no respiratory distress  Abdomen: Soft, nondisteded, nontender, ostomy with stool   Extremities: WWP, SCD's in place     LABS:                        11.9   13.11 )-----------( 391      ( 15 Ladarius 2023 10:02 )             36.3     01-15    136  |  101  |  22  ----------------------------<  173<H>  3.8   |  22  |  0.87    Ca    9.3      15 Ladarius 2023 10:02  Phos  3.8     01-15  Mg     2.1     01-15    TPro  8.5<H>  /  Alb  3.2<L>  /  TBili  0.9  /  DBili  x   /  AST  38  /  ALT  62<H>  /  AlkPhos  213<H>  01-15      Urinalysis Basic - ( 15 Ladarius 2023 03:43 )    Color: Yellow / Appearance: Clear / SG: >=1.030 / pH: x  Gluc: x / Ketone: Trace mg/dL  / Bili: Negative / Urobili: 0.2 E.U./dL   Blood: x / Protein: 30 mg/dL / Nitrite: NEGATIVE   Leuk Esterase: NEGATIVE / RBC: < 5 /HPF / WBC 5-10 /HPF   Sq Epi: x / Non Sq Epi: Moderate /HPF / Bacteria: Many /HPF

## 2023-01-16 NOTE — PROGRESS NOTE ADULT - ASSESSMENT
77M with PMHx of IVDU found unresponsive at his nursing home, resolved after Narcan in the field and brought to [facility]. Found to have PE, atrial flutter, and large LV thrombus on echo. Transferred to [facility] for further management. Pt c/o abdominal pain on 12/10 CTA showing mid SMA with embolus. Abnormal distal small bowel loops and cecum with dilatation and pneumatosis suggesting infarcted bowel. One or two tiny foci of intrahepatic portal vein pneumatosis. Segmental infarction upper pole left kidney. Now s/p Ex lap, SMA embolectomy, 80cm SBR, abthera vac left in discontinuity (12/11) and transferred to SICU intubated. S/p second look (12/13) and most recently s/p OR for 3rd look, end-to-end anastomosis of remaining bowel, loop ileostomy and abdomen closure (12/15). Remains in SICU, now extubated with still with limb ischemia to LLE pending amputation. Now stepped down to tele.     Reg/low fiber diet + ensures, TPN  Pain/nausea control  Imodium, metamucin, tincture of opium for high ileostomy output  A flutter now s/p cardioversion, amio 100 QD, toprolol XL 25 QD  LV thrombus w LLE limb ischemia- Heparin gtt changed to SQL BID  AM labs

## 2023-01-16 NOTE — PROGRESS NOTE ADULT - SUBJECTIVE AND OBJECTIVE BOX
Patient was seen and examined at bedside. Case discuss with resident. Pt reports that he is hungry and he wants to eat.     OBJECTIVE:  Vital Signs Last 24 Hrs  T(C): 37 (16 Jan 2023 09:09), Max: 38.1 (15 Ladarius 2023 16:00)  T(F): 98.6 (16 Jan 2023 09:09), Max: 100.6 (15 Ladarius 2023 16:00)  HR: 96 (16 Jan 2023 12:00) (88 - 104)  BP: 142/83 (16 Jan 2023 12:00) (118/72 - 149/86)  BP(mean): 91 (16 Jan 2023 08:14) (91 - 109)  RR: 16 (16 Jan 2023 12:00) (16 - 22)  SpO2: 100% (16 Jan 2023 12:00) (96% - 100%)    Parameters below as of 16 Jan 2023 03:45  Patient On (Oxygen Delivery Method): room air    Gen: NAD laying in bed  CV: RRR, +S1/S2, no mumur  Pulm: CTA b/l no wheezing or crackles   Abd: soft, NTND + BS no rebound or guarding ostomy with stool   Left leg bandage C/D/I     LABS:                        10.5   10.05 )-----------( 335      ( 16 Jan 2023 05:30 )             33.9     01-16    134<L>  |  100  |  24<H>  ----------------------------<  135<H>  3.9   |  26  |  0.80    Ca    8.7      16 Jan 2023 05:30  Phos  3.5     01-16  Mg     2.1     01-16    TPro  7.4  /  Alb  2.7<L>  /  TBili  0.7  /  DBili  x   /  AST  44<H>  /  ALT  65<H>  /  AlkPhos  187<H>  01-16    LIVER FUNCTIONS - ( 16 Jan 2023 05:30 )  Alb: 2.7 g/dL / Pro: 7.4 g/dL / ALK PHOS: 187 U/L / ALT: 65 U/L / AST: 44 U/L / GGT: x           Urinalysis Basic - ( 15 Ladarius 2023 03:43 )  Color: Yellow / Appearance: Clear / SG: >=1.030 / pH: x  Gluc: x / Ketone: Trace mg/dL  / Bili: Negative / Urobili: 0.2 E.U./dL   Blood: x / Protein: 30 mg/dL / Nitrite: NEGATIVE   Leuk Esterase: NEGATIVE / RBC: < 5 /HPF / WBC 5-10 /HPF   Sq Epi: x / Non Sq Epi: Moderate /HPF / Bacteria: Many /HPF    acetaminophen     Tablet .. 650 milliGRAM(s) Oral every 6 hours PRN  aMIOdarone    Tablet 100 milliGRAM(s) Oral every 24 hours  ascorbic acid 500 milliGRAM(s) Oral daily  chlorhexidine 2% Cloths 1 Application(s) Topical <User Schedule>  chlorhexidine 2% Cloths 1 Application(s) Topical <User Schedule>  dextrose 5%. 1000 milliLiter(s) IV Continuous <Continuous>  dextrose 5%. 1000 milliLiter(s) IV Continuous <Continuous>  dextrose Oral Gel 15 Gram(s) Oral once PRN  dronabinol 5 milliGRAM(s) Oral every 12 hours  enoxaparin Injectable 60 milliGRAM(s) SubCutaneous every 12 hours  fat emulsion (Fish Oil and Plant Based) 20% Infusion 0.31 Gm/kG/Day IV Continuous <Continuous>  glucagon  Injectable 1 milliGRAM(s) IntraMuscular once  influenza  Vaccine (HIGH DOSE) 0.7 milliLiter(s) IntraMuscular once  insulin lispro (ADMELOG) corrective regimen sliding scale   SubCutaneous every 6 hours  lactated ringers. 1000 milliLiter(s) IV Continuous <Continuous>  metoprolol succinate ER 25 milliGRAM(s) Oral daily  mirtazapine 15 milliGRAM(s) Oral at bedtime  multivitamin 1 Tablet(s) Oral daily  ondansetron Injectable 4 milliGRAM(s) IV Push every 6 hours PRN  oxyCODONE    IR 5 milliGRAM(s) Oral every 4 hours PRN  pantoprazole    Tablet 40 milliGRAM(s) Oral two times a day  Parenteral Nutrition - Adult 1 Each TPN Continuous <Continuous>  Parenteral Nutrition - Adult 1 Each TPN Continuous <Continuous>  silver sulfADIAZINE 1% Cream 1 Application(s) Topical daily  sodium chloride 0.9% lock flush 10 milliLiter(s) IV Push every 1 hour PRN  zinc sulfate 220 milliGRAM(s) Oral daily

## 2023-01-16 NOTE — PROGRESS NOTE ADULT - ASSESSMENT
A/P: 77 year old man who first presented to [facility] from [facility] due to unresponsiveness (responded to Narcan). At [facility], found to have subsegmental pulmonary embolism in RUL, rapid atrial flutter with severely reduced LVEF with LV thrombus. Transferred to [facility] on 12/7/22 for LORENZO and possible ablation. At [facility], was found to have left leg ischemia (left leg arterial thrombus on LE duplex on 12/8). Was being considered for rhythm control when he developed abdominal pain, CTA (12/10/22) showed embolus in SMA, bowel infarct, requiring ex-lap on 12/11 with SMA embolectomy, 80cm small bowel resection; On 12/13, underwent 2nd look laparotomy, with 80cm small bowel resection, closure with abthera. On 12/15, underwent 3rd look laparotomy, end-to-end anastomosis of remaining bowel, loop ileostomy and abdominal closure. Now extubated, on tele floor. Eventually underwent LORENZO/DCCV on 12/30/22, now in sinus rhythm. Currently being evaluated for possible above knee amputation for LLE ischemia (possibly deferring till next hospitalization, after optimization of nutritional status)      # Left leg ischemia - Decision re: timing of Above Knee Amputation per vascular surgery and primary team. Optimizing nutritional status prior to surgery     #Nausea and vomiting ( resolved)     #Suicide Ideation  - Psych f/u appreciated and pt was deemed not to be suicidal and pt's 1 to 1 was removed    #Insomnia   - Continue Mirtazapine 15mg po qhs for insomnia    #Transaminitis   - Will continue to trend   -Will f/u Hep B virus PCR    # Acute Systolic Heart Failure   - Continue Metoprolol succinate 25mg qd     # Atrial Flutter   - EP following, now s/p DCCV 12/30. EP recommending uninterrupted AC x 30 days   -Continue enoxaparin 60mg SQ q12  -Continue amiodarone 100mg qd and metoprolol succinate 25 qd     # Pulmonary embolism (subsegmental RUL)   - After completion of AC for DCCV, would need to continue AC for PE  -Continue lovenox    # Bowel ischemia - s/p SMA embolectomy; Small bowel resection 80 cm + 40 cm; ileostomy in place; mgmt per primary team   - Monitor ileostomy output on loperamide 4mg TID, tincture of opium QID     # Severe protein-calorie malnutrition   - Continue TPN     # Sacral wound   - continue off loading, dressings    #GI /DVT PPX  -Continue pantoprazole 40mg PO BID   -Continue lovenox     #DISPO  - As per primary team awaiting a facility that will take a pt on TPN

## 2023-01-16 NOTE — PROGRESS NOTE ADULT - SUBJECTIVE AND OBJECTIVE BOX
Subjective: Pt seen and examined this AM by vascular. Pts LLE dressing is C/D/I. Pt denies any pain to LLE this AM.     ROS:   Denies Headache, blurred vision, Chest Pain, SOB, Abdominal pain, nausea or vomiting     Social   aMIOdarone    Tablet 100  enoxaparin Injectable 60  metoprolol succinate ER 25      Allergies    No Known Allergies    Intolerances        Vital Signs Last 24 Hrs  T(C): 36.5 (16 Jan 2023 05:00), Max: 38.1 (15 Ladarius 2023 16:00)  T(F): 97.7 (16 Jan 2023 05:00), Max: 100.6 (15 Ladarius 2023 16:00)  HR: 94 (16 Jan 2023 03:45) (90 - 122)  BP: 135/77 (16 Jan 2023 03:45) (125/81 - 149/86)  BP(mean): 94 (16 Jan 2023 03:45) (93 - 114)  RR: 17 (16 Jan 2023 03:45) (16 - 22)  SpO2: 97% (16 Jan 2023 03:45) (96% - 100%)    Parameters below as of 16 Jan 2023 03:45  Patient On (Oxygen Delivery Method): room air      I&O's Summary    15 Ladarius 2023 07:01  -  16 Jan 2023 07:00  --------------------------------------------------------  IN: 3965.4 mL / OUT: 3875 mL / NET: 90.4 mL        Physical Exam:  General: Ill-appearing man, resting in bed, NAD  C/V: NSR  Pulm: Nonlabored breathing, no respiratory distress  Abd: soft, nondistended, nontender. No rebound or guarding. Midline incision. Ostomy noted in RLQ   Extrem: L LE with dry gangrene, stable from prior exam, extending from toes to just below the knee      LABS:                        10.5   10.05 )-----------( 335      ( 16 Jan 2023 05:30 )             33.9     01-16    134<L>  |  100  |  24<H>  ----------------------------<  135<H>  3.9   |  26  |  0.80    Ca    8.7      16 Jan 2023 05:30  Phos  3.5     01-16  Mg     2.1     01-16    TPro  7.4  /  Alb  2.7<L>  /  TBili  0.7  /  DBili  x   /  AST  44<H>  /  ALT  65<H>  /  AlkPhos  187<H>  01-16          A/P: 77y y/o Male with significant PMHx of IVDU found unresponsive at his nursing home, resolved after Narcan in the field, and brought to [facility]. Found to have PE, atrial flutter, and large LV thrombus on echo. Transferred to [facility] for further management. Pt C/o abdominal pain on 12/10 CTA showing mid SMA with embolus. Abnormal distal small bowel loops and cecum with dilatation and pneumatosis suggesting infarcted bowel. One or two tiny foci of  intrahepatic portal vein pneumatosis. Segmental infarction upper pole left kidney. Now s/p Ex lap, SMA embolectomy, 80cm SBR, abthera vac left in discontinuity (12/11) and transferred to SICU intubated. S/p second look (12/13) and most recently s/p OR for 3rd look, end-to-end anastomosis of remaining bowel, loop ileostomy and abdomen closure (12/15). Now stepped down with limb ischemia to LLE pending amputation. Ischemia has not yet fully demarcated, it is clear that the posterior calf (which is needed to heal a BKA flap) is involved and therefore only surgical option available to the patient is an AKA pending medical optimization.     Recommendations:  - If clinically feasible from primary team perspective, patient okay to be discharged, no vascular surgery intervention precluding discharge  - Patient can represent at another time when nutritional status is optimized for elective amputation  - Continue local wound care with betadine to all skin below the line of demarcation of his left shin and kerlix. No offloading boot, keep left heel elevated off bed.   - Continue supportive measures  - Rest of care per primary team   - Vascular surgery Team 3C will continue to follow. Please call [phone] with any questions or concerns

## 2023-01-16 NOTE — BH CONSULTATION LIAISON PROGRESS NOTE - NSBHFUPINTERVALHXFT_PSY_A_CORE
The patient stated, "I don't want to live this, I'd rather be dead." He reports that although medical care is now happening more so during the day, he still has been finding it difficulty to sleep at night with the beeping form various monitors in his room. He reports distress at feelign too weak to feed himself as well as still finding it difficult to keep down food while eating. He reported that the pain in his abdomen has not abated as of yet which has also affected his sleep and mood. He reports passive SI but no active SI to harm himself in the hospital. He stated that he feels if he slept more and if his pain was more controlled that he would likely feel better. He doesn't have any plan to harm himself outside of the hospital. He is interested in having someone from psychology or psychiatry stop by tomorrow.

## 2023-01-17 NOTE — BH CONSULTATION LIAISON PROGRESS NOTE - NSBHATTESTCOMMENTATTENDFT_PSY_A_CORE
Agree with above, Assessment and plan are edited by writer. On my evaluation today, pt reports that he is feeling demoralized and hopeless about his health condition, with poor sleep due to both pain (reported leg pain to me) and persistent insomnia after pain medication effective. He states that he is tired this morning and frustrated about his physical state. He denies any current thoughts about suicide stating "I'm not thinking about ending it", with overall endorsed motivation to get well. Pt agreeable to continue meeting with psychology staff while in hospital. Pt agreeable to continue trial of mirtazapine and would like to consider an alternative medication if ineffective for insomnia. No psychiatric barrier to discharge when medically ready.

## 2023-01-17 NOTE — CONSULT NOTE ADULT - ASSESSMENT
76 y/o male with no significant past medical history BIBA from [facility] due to unresponsiveness. Initially admitted to CCU for LV thrombus, course c/b bowel ischemia s/p multiple interventions and LLE ischemia that requires AKA. ID consulted for new fevers. Do not suspect bacterial infection as he has dry gangrene, sacral wound looks clean, and culture data is NGTD.    Recommendation:  - check for influenza and COVID-19  - f/u procalcitonin and CRP  - f/u BCx 1/14    ID Team 1 will follow with you. Recommendations not final until attending attestation present.

## 2023-01-17 NOTE — PROGRESS NOTE ADULT - ASSESSMENT
77M with PMHx of IVDU found unresponsive at his nursing home, resolved after Narcan in the field and brought to [facility]. Found to have PE, atrial flutter, and large LV thrombus on echo. Transferred to [facility] for further management. Pt c/o abdominal pain on 12/10 CTA showing mid SMA with embolus. Abnormal distal small bowel loops and cecum with dilatation and pneumatosis suggesting infarcted bowel. One or two tiny foci of intrahepatic portal vein pneumatosis. Segmental infarction upper pole left kidney. Now s/p Ex lap, SMA embolectomy, 80cm SBR, abthera vac left in discontinuity (12/11) and transferred to SICU intubated. S/p second look (12/13) and most recently s/p OR for 3rd look, end-to-end anastomosis of remaining bowel, loop ileostomy and abdomen closure (12/15). LLE ischemia, AKA delayed by nutritional optimization.    CLD + ensures, TPN  Pain/nausea control  CTAP with PO and IV contrast  A flutter now s/p cardioversion, amio 100 QD, toprolol XL 25 QD  LV thrombus w LLE limb ischemia- Heparin gtt changed to SQL BID  AKA likely outpatient  AM labs  Dispo: [facility] pending auth and placement

## 2023-01-17 NOTE — BH CONSULTATION LIAISON PROGRESS NOTE - NSBHASSESSMENTFT_PSY_ALL_CORE
77 year old male with history of substance use and no significant past medical history (poor medical follow up, last PCP visit 20 years prior to admission), presenting with unresponsiveness from nursing home to [facility], found to be in Aflutter w RVR with subsegmental PE RUL, transferred to [facility], found to have LV thrombus and worsening HF, EF 10-15%. 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 Course further complicated by LLE pulselessness and ulcerations that will ultimately require BKA. Psychiatry consulted due to depressed mood and suicidality.     The patient presents as depressed, verbalizing ongoing hopelessness and intermittent passive SI, no current SI on evaluation today, never any intent or plan. Continues with difficulty initiating sleep as well as with health-related worry and disruptive pain; no reported response to initiation of mirtazapine on 1/12. No evidence of tyler, internal response to stimuli, or other psychotic phenomena. At the present time the patient does not appear to be at imminent risk of harm to self. No aggressive ideation/behavior noted or reported.     :::Recommendations:::  - No need for 1:1 observation for suicidality  - Continue trial of mirtazapine for insomnia and depressive symptoms. Can consider increasing dose or switch to alternative medication, such as trazodone, if limited response continues  -Encourage sleep hygiene and limiting sleep during the day  - When medically possible, group medical visits/interventions together such that the patient has extended periods of uninterrupted sleep. Impaired sleep has a direct relationship with the development of irritability and mood disorders.   -pain management as per primary team  - The patient greatly enjoys playing chess; a chess board is available through [employer] (4th Floor [employer]); the patient is aware that he can request the board be brought.  -Psychology will follow up for individual psychotherapy  -Contact CL with any questions/concerns  - No psychiatric contraindications to discharge

## 2023-01-17 NOTE — CONSULT NOTE ADULT - SUBJECTIVE AND OBJECTIVE BOX
INFECTIOUS DISEASE INITIAL CONSULT NOTE    Patient is a 77y old  Male who presents with a chief complaint of A flutter (17 Jan 2023 12:39)    HPI:  Patient is a 78 y/o male with no significant past medical history BIBA from [facility] due to unresponsiveness. Patient was reportedly found unresponsive at his nursing home, Narcan was given in the field with resolution, and patient was taken to [facility]. Pt was persistently tachycardic in 130-140s despite receiving Adenosine 6mg IV and then 12mg IV, Lopressor 2.5mg x2, PO Metoprolol tartrate 100mg. Utox was positive for opioids. CTPA suggestive of subsegmental PE in the right upper lobe. Echo showed severely reduced LV function with an LV thrombus. Heparin gtt was started. Cardiology and EP were consulted due to atrial flutter. EP suggested transfer to [facility] for LORENZO and possible ablation.  (07 Dec 2022 12:26)      Interval HPI:     REVIEW OF SYSTEMS  All review of systems negative, except for those above    Recent Ill Contacts:	[x] No	[] Yes:  Recent Travel History:	[x] No	[] Yes:  Recent Animal/Insect Exposure/Tick Bites:	[x] No	[] Yes:    Allergies    No Known Allergies    Intolerances      Antimicrobials:      Other Medications:  acetaminophen     Tablet .. 650 milliGRAM(s) Oral every 6 hours PRN  aMIOdarone    Tablet 100 milliGRAM(s) Oral every 24 hours  ascorbic acid 500 milliGRAM(s) Oral daily  chlorhexidine 2% Cloths 1 Application(s) Topical <User Schedule>  chlorhexidine 2% Cloths 1 Application(s) Topical <User Schedule>  dextrose 5%. 1000 milliLiter(s) IV Continuous <Continuous>  dextrose 5%. 1000 milliLiter(s) IV Continuous <Continuous>  dextrose Oral Gel 15 Gram(s) Oral once PRN  dronabinol 5 milliGRAM(s) Oral every 12 hours  enoxaparin Injectable 60 milliGRAM(s) SubCutaneous every 12 hours  fat emulsion (Fish Oil and Plant Based) 20% Infusion 0.31 Gm/kG/Day IV Continuous <Continuous>  glucagon  Injectable 1 milliGRAM(s) IntraMuscular once  influenza  Vaccine (HIGH DOSE) 0.7 milliLiter(s) IntraMuscular once  insulin lispro (ADMELOG) corrective regimen sliding scale   SubCutaneous every 6 hours  lactated ringers. 1000 milliLiter(s) IV Continuous <Continuous>  melatonin 3 milliGRAM(s) Oral at bedtime PRN  metoprolol succinate ER 25 milliGRAM(s) Oral daily  mirtazapine 15 milliGRAM(s) Oral at bedtime  multivitamin 1 Tablet(s) Oral daily  ondansetron Injectable 4 milliGRAM(s) IV Push every 6 hours PRN  oxyCODONE    IR 5 milliGRAM(s) Oral every 4 hours PRN  pantoprazole    Tablet 40 milliGRAM(s) Oral two times a day  Parenteral Nutrition - Adult 1 Each TPN Continuous <Continuous>  Parenteral Nutrition - Adult 1 Each TPN Continuous <Continuous>  silver sulfADIAZINE 1% Cream 1 Application(s) Topical daily  sodium chloride 0.9% lock flush 10 milliLiter(s) IV Push every 1 hour PRN  zinc sulfate 220 milliGRAM(s) Oral daily      FAMILY HISTORY:    PAST MEDICAL & SURGICAL HISTORY:    SOCIAL HISTORY:  Alcohol: yes  Drugs: cocaine, heroin  Tobacco: yes  Employment: unemployed  Living: nursing home    Daily     Daily   Head Circumference:  Vital Signs Last 24 Hrs  T(C): 36.8 (17 Jan 2023 13:09), Max: 38.8 (17 Jan 2023 08:50)  T(F): 98.3 (17 Jan 2023 13:09), Max: 101.8 (17 Jan 2023 08:50)  HR: 102 (17 Jan 2023 13:26) (98 - 104)  BP: 140/78 (17 Jan 2023 13:26) (123/63 - 158/77)  BP(mean): 103 (17 Jan 2023 13:26) (87 - 106)  RR: 17 (17 Jan 2023 13:26) (16 - 20)  SpO2: 100% (17 Jan 2023 13:26) (97% - 100%)    Parameters below as of 17 Jan 2023 13:26  Patient On (Oxygen Delivery Method): room air        PHYSICAL EXAM    HEENT: NC/AT; anicteric sclera; moist mucosal membranes.  Neck: supple, trachea midline  Cardiovascular: RRR, +S1/S2; +systolic murmur  Respiratory: CTA B/L; no W/R/R. Intubated.   Gastrointestinal: soft, NT/ND; +BSx4  Extremities: no edema or cyanosis; no nailbed hemorrhage, osler nodes, janeway lesions  Vascular: +2 radial, DP/PT pulses  Neurological: AAOx3, no FND    Respiratory Support:		[] No	[] Yes:  Vasoactive medication infusion:	[] No	[] Yes:  Venous catheters:		[] No	[] Yes:  Bladder catheter:		        [] No	[] Yes:  Other catheters or tubes:	[] No	[] Yes:    Lab Results:                        10.1   11.38 )-----------( 347      ( 17 Jan 2023 05:30 )             30.9     01-17    136  |  101  |  16  ----------------------------<  136<H>  3.5   |  26  |  0.60    Ca    8.3<L>      17 Jan 2023 05:30  Phos  2.6     01-17  Mg     1.9     01-17    TPro  7.0  /  Alb  2.7<L>  /  TBili  0.7  /  DBili  x   /  AST  41<H>  /  ALT  65<H>  /  AlkPhos  175<H>  01-17    LIVER FUNCTIONS - ( 17 Jan 2023 05:30 )  Alb: 2.7 g/dL / Pro: 7.0 g/dL / ALK PHOS: 175 U/L / ALT: 65 U/L / AST: 41 U/L / GGT: x                 MICROBIOLOGY  [] Pathology slides reviewed and/or discussed with pathologist  [] Microbiology findings discussed with microbiologist or slides reviewed  [] Images erviewed with radiologist  [] Case discussed with an attending physician in addition to the patient's primary physician  [] Records, reports from outside [facility] reviewed    [] Patient requires continued monitoring for:  [] Total time spent by attending physician: __ minutes, excluding procedure time.    Recommendations not final until attending attestation present.  INFECTIOUS DISEASE INITIAL CONSULT NOTE    Patient is a 77y old  Male who presents with a chief complaint of A flutter (17 Jan 2023 12:39)    HPI:  Patient is a 78 y/o male with no significant past medical history BIBA from [facility] due to unresponsiveness. Patient was reportedly found unresponsive at his nursing home, Narcan was given in the field with resolution, and patient was taken to [facility]. Pt was persistently tachycardic in 130-140s despite receiving Adenosine 6mg IV and then 12mg IV, Lopressor 2.5mg x2, PO Metoprolol tartrate 100mg. Utox was positive for opioids. CTPA suggestive of subsegmental PE in the right upper lobe. Echo showed severely reduced LV function with an LV thrombus. Heparin gtt was started. Cardiology and EP were consulted due to atrial flutter. EP suggested transfer to [facility] for LORENZO and possible ablation.  (07 Dec 2022 12:26)      Interval HPI: Course c/b bowel ischemia s/p multiple interventions and LLE ischemia that requires AKA. Pt is withdrawn and minimally conversive. Does not report any fevers, chills, cough, SOB.     REVIEW OF SYSTEMS  All review of systems negative, except for those above    Recent Ill Contacts:	[x] No	[] Yes:  Recent Travel History:	[x] No	[] Yes:  Recent Animal/Insect Exposure/Tick Bites:	[x] No	[] Yes:    Allergies    No Known Allergies    Intolerances      Antimicrobials:      Other Medications:  acetaminophen     Tablet .. 650 milliGRAM(s) Oral every 6 hours PRN  aMIOdarone    Tablet 100 milliGRAM(s) Oral every 24 hours  ascorbic acid 500 milliGRAM(s) Oral daily  chlorhexidine 2% Cloths 1 Application(s) Topical <User Schedule>  chlorhexidine 2% Cloths 1 Application(s) Topical <User Schedule>  dextrose 5%. 1000 milliLiter(s) IV Continuous <Continuous>  dextrose 5%. 1000 milliLiter(s) IV Continuous <Continuous>  dextrose Oral Gel 15 Gram(s) Oral once PRN  dronabinol 5 milliGRAM(s) Oral every 12 hours  enoxaparin Injectable 60 milliGRAM(s) SubCutaneous every 12 hours  fat emulsion (Fish Oil and Plant Based) 20% Infusion 0.31 Gm/kG/Day IV Continuous <Continuous>  glucagon  Injectable 1 milliGRAM(s) IntraMuscular once  influenza  Vaccine (HIGH DOSE) 0.7 milliLiter(s) IntraMuscular once  insulin lispro (ADMELOG) corrective regimen sliding scale   SubCutaneous every 6 hours  lactated ringers. 1000 milliLiter(s) IV Continuous <Continuous>  melatonin 3 milliGRAM(s) Oral at bedtime PRN  metoprolol succinate ER 25 milliGRAM(s) Oral daily  mirtazapine 15 milliGRAM(s) Oral at bedtime  multivitamin 1 Tablet(s) Oral daily  ondansetron Injectable 4 milliGRAM(s) IV Push every 6 hours PRN  oxyCODONE    IR 5 milliGRAM(s) Oral every 4 hours PRN  pantoprazole    Tablet 40 milliGRAM(s) Oral two times a day  Parenteral Nutrition - Adult 1 Each TPN Continuous <Continuous>  Parenteral Nutrition - Adult 1 Each TPN Continuous <Continuous>  silver sulfADIAZINE 1% Cream 1 Application(s) Topical daily  sodium chloride 0.9% lock flush 10 milliLiter(s) IV Push every 1 hour PRN  zinc sulfate 220 milliGRAM(s) Oral daily      FAMILY HISTORY:    PAST MEDICAL & SURGICAL HISTORY:    SOCIAL HISTORY:  Alcohol: yes  Drugs: cocaine, heroin  Tobacco: yes  Employment: unemployed  Living: nursing home    Daily     Daily   Head Circumference:  Vital Signs Last 24 Hrs  T(C): 36.8 (17 Jan 2023 13:09), Max: 38.8 (17 Jan 2023 08:50)  T(F): 98.3 (17 Jan 2023 13:09), Max: 101.8 (17 Jan 2023 08:50)  HR: 102 (17 Jan 2023 13:26) (98 - 104)  BP: 140/78 (17 Jan 2023 13:26) (123/63 - 158/77)  BP(mean): 103 (17 Jan 2023 13:26) (87 - 106)  RR: 17 (17 Jan 2023 13:26) (16 - 20)  SpO2: 100% (17 Jan 2023 13:26) (97% - 100%)    Parameters below as of 17 Jan 2023 13:26  Patient On (Oxygen Delivery Method): room air        PHYSICAL EXAM    Gen: frail  HEENT: NC/AT; anicteric sclera; moist mucosal membranes.  Neck: supple, trachea midline  Cardiovascular: RRR, +S1/S2; +systolic murmur  Respiratory: CTA B/L; no W/R/R. Intubated.   Gastrointestinal: soft, NT/ND; +BSx4  Extremities: no edema; LLE dry gangrene to knee  Vascular: +2 radial, DP/PT pulses  Neurological: AAOx3, no FND    Respiratory Support:		[] No	[] Yes:  Vasoactive medication infusion:	[] No	[] Yes:  Venous catheters:		[] No	[] Yes:  Bladder catheter:		        [] No	[] Yes:  Other catheters or tubes:	[] No	[] Yes:    Lab Results:                        10.1   11.38 )-----------( 347      ( 17 Jan 2023 05:30 )             30.9     01-17    136  |  101  |  16  ----------------------------<  136<H>  3.5   |  26  |  0.60    Ca    8.3<L>      17 Jan 2023 05:30  Phos  2.6     01-17  Mg     1.9     01-17    TPro  7.0  /  Alb  2.7<L>  /  TBili  0.7  /  DBili  x   /  AST  41<H>  /  ALT  65<H>  /  AlkPhos  175<H>  01-17    LIVER FUNCTIONS - ( 17 Jan 2023 05:30 )  Alb: 2.7 g/dL / Pro: 7.0 g/dL / ALK PHOS: 175 U/L / ALT: 65 U/L / AST: 41 U/L / GGT: x                 MICROBIOLOGY  [] Pathology slides reviewed and/or discussed with pathologist  [] Microbiology findings discussed with microbiologist or slides reviewed  [] Images erviewed with radiologist  [] Case discussed with an attending physician in addition to the patient's primary physician  [] Records, reports from outside [facility] reviewed    [] Patient requires continued monitoring for:  [] Total time spent by attending physician: __ minutes, excluding procedure time.    Recommendations not final until attending attestation present.

## 2023-01-17 NOTE — PROGRESS NOTE ADULT - SUBJECTIVE AND OBJECTIVE BOX
Subjective: overnight ileostomy 1.5L, bolused 250. Pt states last night he was having pain in his left leg however has no pain this morning during dressing change. Pts LLE dressing is C/D/I.     ROS:   Denies Headache, blurred vision, Chest Pain, SOB, Abdominal pain, nausea or vomiting     Social   aMIOdarone    Tablet 100  enoxaparin Injectable 60  metoprolol succinate ER 25      Allergies    No Known Allergies    Intolerances        Vital Signs Last 24 Hrs  T(C): 37.8 (17 Jan 2023 04:49), Max: 37.9 (16 Jan 2023 21:46)  T(F): 100.1 (17 Jan 2023 04:49), Max: 100.3 (16 Jan 2023 21:46)  HR: 100 (17 Jan 2023 04:01) (88 - 104)  BP: 148/77 (17 Jan 2023 04:01) (118/72 - 158/77)  BP(mean): 103 (17 Jan 2023 04:01) (87 - 106)  RR: 18 (17 Jan 2023 04:01) (16 - 20)  SpO2: 100% (17 Jan 2023 04:01) (97% - 100%)    Parameters below as of 17 Jan 2023 04:01  Patient On (Oxygen Delivery Method): room air      I&O's Summary    16 Jan 2023 07:01  -  17 Jan 2023 07:00  --------------------------------------------------------  IN: 4221 mL / OUT: 2600 mL / NET: 1621 mL        Physical Exam:  General: Ill-appearing man, resting in bed, NAD  C/V: NSR  Pulm: Nonlabored breathing, no respiratory distress  Abd: soft, nondistended, nontender. No rebound or guarding. Midline incision. Ostomy noted in RLQ   Extrem: LLE with dry gangrene, stable from prior exam, extending from toes to just below the knee      LABS:                        10.1   11.38 )-----------( 347      ( 17 Jan 2023 05:30 )             30.9     01-17    136  |  101  |  16  ----------------------------<  136<H>  3.5   |  26  |  0.60    Ca    8.3<L>      17 Jan 2023 05:30  Phos  2.6     01-17  Mg     1.9     01-17    TPro  7.0  /  Alb  2.7<L>  /  TBili  0.7  /  DBili  x   /  AST  41<H>  /  ALT  65<H>  /  AlkPhos  175<H>  01-17      A/P: 77y y/o Male with significant PMHx of IVDU found unresponsive at his nursing home, resolved after Narcan in the field, and brought to [facility]. Found to have PE, atrial flutter, and large LV thrombus on echo. Transferred to [facility] for further management. Pt C/o abdominal pain on 12/10 CTA showing mid SMA with embolus. Abnormal distal small bowel loops and cecum with dilatation and pneumatosis suggesting infarcted bowel. One or two tiny foci of  intrahepatic portal vein pneumatosis. Segmental infarction upper pole left kidney. Now s/p Ex lap, SMA embolectomy, 80cm SBR, abthera vac left in discontinuity (12/11) and transferred to SICU intubated. S/p second look (12/13) and most recently s/p OR for 3rd look, end-to-end anastomosis of remaining bowel, loop ileostomy and abdomen closure (12/15). Now stepped down with limb ischemia to LLE pending amputation. Ischemia has not yet fully demarcated, it is clear that the posterior calf (which is needed to heal a BKA flap) is involved and therefore only surgical option available to the patient is an AKA pending medical optimization.     Recommendations:  - If clinically feasible from primary team perspective, patient okay to be discharged, no vascular surgery intervention precluding discharge  - Patient can represent at another time when nutritional status is optimized for elective amputation  - Continue local wound care with betadine to all skin below the line of demarcation of his left shin and kerlix. No offloading boot, keep left heel elevated off bed.   - Continue supportive measures  - Rest of care per primary team   - Vascular surgery Team 3C will continue to follow. Please call [phone] with any questions or concerns

## 2023-01-17 NOTE — PROGRESS NOTE ADULT - ASSESSMENT
76 y/o M first presented to [facility] from [facility] due to unresponsiveness (responded to Narcan). At [facility], found to have subsegmental pulmonary embolism in RUL, rapid atrial flutter with severely reduced LVEF with LV thrombus. Transferred to [facility] on 12/7/22 for LORENZO and possible ablation. At [facility], was found to have left leg ischemia (left leg arterial thrombus on LE duplex on 12/8). Was being considered for rhythm control when he developed abdominal pain, CTA (12/10/22) showed embolus in SMA, bowel infarct, requiring ex-lap on 12/11 with SMA embolectomy, 80cm small bowel resection; On 12/13, underwent 2nd look laparotomy, with 80cm small bowel resection, closure with abthera. On 12/15, underwent 3rd look laparotomy, end-to-end anastomosis of remaining bowel, loop ileostomy and abdominal closure. Now extubated, on tele floor. Eventually underwent LORENZO/DCCV on 12/30/22, now in sinus rhythm. Currently being evaluated for possible above knee amputation for LLE ischemia (possibly deferring till next hospitalization, after optimization of nutritional status)      #Fever  Patient with worsening WBC, febrile. Infectious work-up in progress.  ID evaluation    # Left leg ischemia -  Decision re: timing of Above Knee Amputation per vascular surgery and primary team. Optimizing nutritional status prior to surgery     #Nausea and vomiting ( resolved)     #Suicide Ideation  Psych f/u appreciated    #Insomnia   Continue Mirtazapine 15mg po qhs for insomnia    #Transaminitis   Improving   Continue to trend   f/u Hep B virus PCR    # Acute Systolic Heart Failure   Continue Metoprolol succinate 25mg qd     # Atrial Flutter  EP following, s/p DCCV 12/30. EP recommending uninterrupted AC x 30 days   Continue enoxaparin 60mg SQ q12  Continue amiodarone 100mg qd and metoprolol succinate 25 qd     # Pulmonary embolism (subsegmental RUL)    After completion of AC for DCCV, would need to continue AC for PE  Continue lovenox    # Bowel ischemia - s/p SMA embolectomy; Small bowel resection 80 cm + 40 cm; ileostomy in place; management per primary team   Monitor ileostomy output on loperamide 4mg TID, tincture of opium QID     # Severe protein-calorie malnutrition   Continue TPN     # Sacral wound  continue off loading, dressings    #GI /DVT PPX  -Continue pantoprazole 40mg PO BID   -Continue lovenox     #DISPO  - As per primary team awaiting a facility that will take a pt on TPN

## 2023-01-17 NOTE — PROGRESS NOTE ADULT - SUBJECTIVE AND OBJECTIVE BOX
INTERVAL HPI/OVERNIGHT EVENTS: ileostomy 1.5L, bolused 250.    SUBJECTIVE: Patient seen and examined at bedside with chief. States pain has resolved, able to tolerate CLD without n/v, denies cp, sob. Ostomy +F/+BM.      aMIOdarone    Tablet 100 milliGRAM(s) Oral every 24 hours  enoxaparin Injectable 60 milliGRAM(s) SubCutaneous every 12 hours  metoprolol succinate ER 25 milliGRAM(s) Oral daily      Vital Signs Last 24 Hrs  T(C): 37.8 (17 Jan 2023 04:49), Max: 37.9 (16 Jan 2023 21:46)  T(F): 100.1 (17 Jan 2023 04:49), Max: 100.3 (16 Jan 2023 21:46)  HR: 100 (17 Jan 2023 04:01) (88 - 104)  BP: 148/77 (17 Jan 2023 04:01) (118/72 - 158/77)  BP(mean): 103 (17 Jan 2023 04:01) (87 - 106)  RR: 18 (17 Jan 2023 04:01) (16 - 20)  SpO2: 100% (17 Jan 2023 04:01) (97% - 100%)    Parameters below as of 17 Jan 2023 04:01  Patient On (Oxygen Delivery Method): room air      I&O's Detail    16 Jan 2023 07:01  -  17 Jan 2023 07:00  --------------------------------------------------------  IN:    Fat Emulsion (Fish Oil &amp; Plant Based) 20% Infusion: 8 mL    Fat Emulsion (Fish Oil &amp; Plant Based) 20% Infusion: 83 mL    Lactated Ringers: 2450 mL    Oral Fluid: 480 mL    TPN (Total Parenteral Nutrition): 1200 mL  Total IN: 4221 mL    OUT:    Ileostomy (mL): 1925 mL    Voided (mL): 675 mL  Total OUT: 2600 mL    Total NET: 1621 mL          General: NAD, resting comfortably in bed  C/V: NSR  Pulm: Nonlabored breathing, no respiratory distress  Abd: soft, NT/ND. Ostomy ppp with gas and stool in pouch.  Extrem: WWP, no edema, SCDs in place        LABS:                        10.1   11.38 )-----------( 347      ( 17 Jan 2023 05:30 )             30.9     01-17    136  |  101  |  16  ----------------------------<  136<H>  3.5   |  26  |  0.60    Ca    8.3<L>      17 Jan 2023 05:30  Phos  2.6     01-17  Mg     1.9     01-17    TPro  7.0  /  Alb  2.7<L>  /  TBili  0.7  /  DBili  x   /  AST  41<H>  /  ALT  65<H>  /  AlkPhos  175<H>  01-17          RADIOLOGY & ADDITIONAL STUDIES:

## 2023-01-17 NOTE — PROGRESS NOTE ADULT - SUBJECTIVE AND OBJECTIVE BOX
Patient is a 77y old  Male who presents with a chief complaint of A flutter (17 Jan 2023 07:35)    INTERVAL EVENTS:    SUBJECTIVE:  Patient was seen and examined at bedside. Patient awake, alert, answering a few questions. Denies SOB, no chest pain, no N/V, denies abdominal pain. Febrile this morning, infectious work-up in progress.     Review of systems: limited as above     Diet, Clear Liquid:   Supplement Feeding Modality:  Oral  Ensure Clear Cans or Servings Per Day:  2       Frequency:  Three Times a day (01-16-23 @ 14:36) [Active]      MEDICATIONS:  MEDICATIONS  (STANDING):  aMIOdarone    Tablet 100 milliGRAM(s) Oral every 24 hours  ascorbic acid 500 milliGRAM(s) Oral daily  chlorhexidine 2% Cloths 1 Application(s) Topical <User Schedule>  chlorhexidine 2% Cloths 1 Application(s) Topical <User Schedule>  dextrose 5%. 1000 milliLiter(s) (50 mL/Hr) IV Continuous <Continuous>  dextrose 5%. 1000 milliLiter(s) (100 mL/Hr) IV Continuous <Continuous>  dronabinol 5 milliGRAM(s) Oral every 12 hours  enoxaparin Injectable 60 milliGRAM(s) SubCutaneous every 12 hours  fat emulsion (Fish Oil and Plant Based) 20% Infusion 0.31 Gm/kG/Day (8.32 mL/Hr) IV Continuous <Continuous>  glucagon  Injectable 1 milliGRAM(s) IntraMuscular once  influenza  Vaccine (HIGH DOSE) 0.7 milliLiter(s) IntraMuscular once  insulin lispro (ADMELOG) corrective regimen sliding scale   SubCutaneous every 6 hours  lactated ringers. 1000 milliLiter(s) (100 mL/Hr) IV Continuous <Continuous>  metoprolol succinate ER 25 milliGRAM(s) Oral daily  mirtazapine 15 milliGRAM(s) Oral at bedtime  multivitamin 1 Tablet(s) Oral daily  pantoprazole    Tablet 40 milliGRAM(s) Oral two times a day  Parenteral Nutrition - Adult 1 Each (50 mL/Hr) TPN Continuous <Continuous>  Parenteral Nutrition - Adult 1 Each (50 mL/Hr) TPN Continuous <Continuous>  silver sulfADIAZINE 1% Cream 1 Application(s) Topical daily  zinc sulfate 220 milliGRAM(s) Oral daily    MEDICATIONS  (PRN):  acetaminophen     Tablet .. 650 milliGRAM(s) Oral every 6 hours PRN Temp greater or equal to 38C (100.4F), Mild Pain (1 - 3)  dextrose Oral Gel 15 Gram(s) Oral once PRN Blood Glucose LESS THAN 70 milliGRAM(s)/deciliter  melatonin 3 milliGRAM(s) Oral at bedtime PRN Insomnia  ondansetron Injectable 4 milliGRAM(s) IV Push every 6 hours PRN Nausea and/or Vomiting  oxyCODONE    IR 5 milliGRAM(s) Oral every 4 hours PRN Severe Pain (7 - 10)  sodium chloride 0.9% lock flush 10 milliLiter(s) IV Push every 1 hour PRN Pre/post blood products, medications, blood draw, and to maintain line patency      Allergies    No Known Allergies    Intolerances        OBJECTIVE:  Vital Signs Last 24 Hrs  T(C): 37.9 (17 Jan 2023 09:09), Max: 38.8 (17 Jan 2023 08:50)  T(F): 100.3 (17 Jan 2023 09:09), Max: 101.8 (17 Jan 2023 08:50)  HR: 104 (17 Jan 2023 08:50) (96 - 104)  BP: 142/75 (17 Jan 2023 08:50) (123/63 - 158/77)  BP(mean): 98 (17 Jan 2023 08:50) (87 - 106)  RR: 16 (17 Jan 2023 08:50) (16 - 20)  SpO2: 100% (17 Jan 2023 08:50) (97% - 100%)    Parameters below as of 17 Jan 2023 08:50  Patient On (Oxygen Delivery Method): room air      I&O's Summary    16 Jan 2023 07:01  -  17 Jan 2023 07:00  --------------------------------------------------------  IN: 4221 mL / OUT: 2600 mL / NET: 1621 mL    17 Jan 2023 07:01  -  17 Jan 2023 11:32  --------------------------------------------------------  IN: 800 mL / OUT: 0 mL / NET: 800 mL        PHYSICAL EXAM:  General: awake, alert, NAD, looking comfortable, no labored breathing on RA  HEENT: AT/NC, no facial asymmetry   Lungs: poor inspiration, no crackles, no wheezes  Heart: RRR  Abdomen: soft  Extremities: left leg with dry gangrene, no visible tenderness    LABS:                        10.1   11.38 )-----------( 347      ( 17 Jan 2023 05:30 )             30.9     01-17    136  |  101  |  16  ----------------------------<  136<H>  3.5   |  26  |  0.60    Ca    8.3<L>      17 Jan 2023 05:30  Phos  2.6     01-17  Mg     1.9     01-17    TPro  7.0  /  Alb  2.7<L>  /  TBili  0.7  /  DBili  x   /  AST  41<H>  /  ALT  65<H>  /  AlkPhos  175<H>  01-17    LIVER FUNCTIONS - ( 17 Jan 2023 05:30 )  Alb: 2.7 g/dL / Pro: 7.0 g/dL / ALK PHOS: 175 U/L / ALT: 65 U/L / AST: 41 U/L / GGT: x             CAPILLARY BLOOD GLUCOSE      POCT Blood Glucose.: 128 mg/dL (17 Jan 2023 06:13)        MICRODATA:    Culture - Blood (collected 14 Jan 2023 19:27)  Source: .Blood Blood  Preliminary Report (16 Jan 2023 21:00):    No growth at 2 days.        RADIOLOGY/OTHER STUDIES:

## 2023-01-17 NOTE — BH CONSULTATION LIAISON PROGRESS NOTE - NSBHFUPINTERVALHXFT_PSY_A_CORE
78 y/o M with AFIB, prolonged hospitalization, currently being evaluated for possible above knee amputation for LLE ischemia (possibly deferring till next hospitalization, after optimization of nutritional status).    Patient was seen at for follow-up evaluation by C/L psychiatry for passive suicidal ideation. Pt seen by psychology intern; pt was asleep, awoken by verbal stimulus. He reported being unable to sleep throughout last night due to pain in his lower back, which he has been c/o for the past several weeks. He stated that he has been sleeping throughout this morning. His mood was irritable upon awaking, asked writer to "come back later." Writer let pt know that someone from psychology will see him tomorrow and/or the following day; writer asked pt if there was anything he wanted to report to writer today, and pt denied.    No SI observed or reported. Pt has been followed by psychology/psychiatry throughout this hospitalization; to this writer, pt has only ever endorsed passive SI with no plan or intent. Pt exhibits future-oriented thinking, wanting to feel better physically, and makes his needs known.    1:1 observation for suicide risk can be discontinued at this time.

## 2023-01-18 NOTE — PROGRESS NOTE ADULT - ASSESSMENT
78 y/o male with no significant past medical history BIBA from [facility] due to unresponsiveness. Initially admitted to CCU for LV thrombus, course c/b bowel ischemia s/p multiple interventions and LLE ischemia that requires AKA. ID consulted for new fevers. Do not suspect bacterial infection as he has dry gangrene, sacral wound looks clean, and culture data is NGTD. COVID-19 and flu negative. Procal and CRP negative. Suspect fever is due to resolving hematoma.     Recommendation:  - monitor to see if afebrile for 48 hours  - f/u BCx 1/14    ID Team 1 will follow with you. Recommendations not final until attending attestation present.

## 2023-01-18 NOTE — CHART NOTE - NSCHARTNOTEFT_GEN_A_CORE
Admitting Diagnosis:   Patient is a 77y old  Male who presents with a chief complaint of A flutter (2023 11:21)    PAST MEDICAL & SURGICAL HISTORY:  PMHx of IVDU     Current Nutrition Order:  Full Liquid diet with Ensure Max TID (450 kcal, 60g pro)   TPN: 1200 ml TV @ 50 ml/hr providing 165g Dex, 50g AA, 20g SMOF lipids to provide 961 kcal, 50g pro, GIR 1.79. RD to follow.     PO Intake: Good (%) [   ]  Fair (50-75%) [   ] Poor (<25%) [   ]- <50% est needs. Please see subjective**    GI Issues:   WDL, last BM   Ileostomy (3750 ml/24hrs)  No n/v/d/c  No abd distention or discomfort noted    Pain:  No pain noted at this time     Skin Integrity:  Surgical incision, carla score 15  No pain noted   No pressure ulcers noted    Labs:       133<L>  |  99  |  14  ----------------------------<  129<H>  3.5   |  25  |  0.54    Ca    8.5      2023 05:30  Phos  2.8       Mg     1.8         TPro  7.0  /  Alb  2.7<L>  /  TBili  0.7  /  DBili  x   /  AST  41<H>  /  ALT  65<H>  /  AlkPhos  175<H>      CAPILLARY BLOOD GLUCOSE    POCT Blood Glucose.: 147 mg/dL (2023 17:10)    Medications:  MEDICATIONS  (STANDING):  aMIOdarone    Tablet 100 milliGRAM(s) Oral every 24 hours  ascorbic acid 500 milliGRAM(s) Oral daily  chlorhexidine 2% Cloths 1 Application(s) Topical <User Schedule>  chlorhexidine 2% Cloths 1 Application(s) Topical <User Schedule>  dextrose 5%. 1000 milliLiter(s) (50 mL/Hr) IV Continuous <Continuous>  dextrose 5%. 1000 milliLiter(s) (100 mL/Hr) IV Continuous <Continuous>  diphenoxylate/atropine 2 Tablet(s) Oral every 8 hours  enoxaparin Injectable 60 milliGRAM(s) SubCutaneous every 12 hours  fat emulsion (Fish Oil and Plant Based) 20% Infusion 0.3106 Gm/kG/Day (8.33 mL/Hr) IV Continuous <Continuous>  glucagon  Injectable 1 milliGRAM(s) IntraMuscular once  influenza  Vaccine (HIGH DOSE) 0.7 milliLiter(s) IntraMuscular once  insulin lispro (ADMELOG) corrective regimen sliding scale   SubCutaneous every 6 hours  lactated ringers. 1000 milliLiter(s) (50 mL/Hr) IV Continuous <Continuous>  loperamide 4 milliGRAM(s) Oral every 8 hours  metoprolol succinate ER 25 milliGRAM(s) Oral daily  mirtazapine 15 milliGRAM(s) Oral at bedtime  multivitamin 1 Tablet(s) Oral daily  pantoprazole    Tablet 40 milliGRAM(s) Oral two times a day  Parenteral Nutrition - Adult 1 Each (50 mL/Hr) TPN Continuous <Continuous>  Parenteral Nutrition - Adult 1 Each (83 mL/Hr) TPN Continuous <Continuous>  psyllium Powder 1 Packet(s) Oral every 8 hours  silver sulfADIAZINE 1% Cream 1 Application(s) Topical daily  zinc sulfate 220 milliGRAM(s) Oral daily    MEDICATIONS  (PRN):  acetaminophen     Tablet .. 650 milliGRAM(s) Oral every 6 hours PRN Temp greater or equal to 38C (100.4F), Mild Pain (1 - 3)  dextrose Oral Gel 15 Gram(s) Oral once PRN Blood Glucose LESS THAN 70 milliGRAM(s)/deciliter  melatonin 3 milliGRAM(s) Oral at bedtime PRN Insomnia  ondansetron Injectable 4 milliGRAM(s) IV Push every 6 hours PRN Nausea and/or Vomiting  oxyCODONE    IR 5 milliGRAM(s) Oral every 4 hours PRN Severe Pain (7 - 10)  sodium chloride 0.9% lock flush 10 milliLiter(s) IV Push every 1 hour PRN Pre/post blood products, medications, blood draw, and to maintain line patency    Admitting Anthropometrics:  6'6''  pounds +-10%  Wt 142 pounds BMI 16.4 %IBW=66%     Weight Change:    141.9 pounds - dosing wt    136 pounds - Bedscale wt     **PCM:  >> Reports having 1-2 meals/day + ONS. Per pt claims to have 2 ensure/day and 2 nutrament/day - unclear how accurate this is. ?If pt meeting ~75% EER consistently.  >> Does report wt loss.  pounds x6 months ago. Thinks he now is 135 pounds which is consistent with bedscale wt obtained during initial visit by  pounds. Suggest wt loss of 49 pounds/26% body wt loss.  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     Estimated energy needs:  Current body wt for EER based on clinician judgment. Adjust for age, malnutrition, EF, S/p OR, Pressure ulcer; fluids per team  25-30kcal/k-1950kcal/day   1.3-1.5gm/k-98gm prot/day   **Rec upper end of needs     Subjective:  77M with PMHx of IVDU found unresponsive at his nursing home, resolved after Narcan in the field and brought to [facility]. Found to have PE, atrial flutter, and large LV thrombus on echo. Transferred to [facility] for further management. Pt c/o abdominal pain on 12/10 CTA showing mid SMA with embolus. Abnormal distal small bowel loops and cecum with dilatation and pneumatosis suggesting infarcted bowel. One or two tiny foci of intrahepatic portal vein pneumatosis. Segmental infarction upper pole left kidney. Now s/p Ex lap, SMA embolectomy, 80cm SBR, abthera vac left in discontinuity () and transferred to SICU intubated. S/p second look () and most recently s/p OR for 3rd look, end-to-end anastomosis of remaining bowel, loop ileostomy and abdomen closure (12/15). Transferred to CCU on  for cardiac optimization and now transferred back to SICU  for bleeding hemodynamic instability. Echo  shows EF 10-15% with overall hypokinesis. CTA performed demonstrating large hematoma in R abdomen, new hemoperitoneum, and bilateral pleural effusions. Remains in SICU, now extubated with still with limb ischemia to LLE pending amputation and AC held given BRBPR and hematoma; noted pt agreeable for AKA when feasible - AKA currently Pending Cards. Pt s/p DCCV on  (negative LORENZO on ) with successful conversion to NSR. Planning for AKA with vascular surgery once nutrition status is optimize. Team placed PICC 1/10 and initiated supplemental TPN. On assessment, pt resting in bed. Pt decrased to full liquid diet with Ensure Max TID (450 kcal, 90g pro each) with TPN meeting ~50% of est needs. Due to persistent high output via ostomy with now sudden increase (3750ml/24hrs)- would recommend increasing TPN to meet 100% of est needs as pt is likely not absorbing much nutrition orally after consumption. Disc with team. No n/v/d/c. Last BM . Education deferred at this time. RD to follow.     Prior PES: Malnutrition Severe in Chronic state RT presumed intake<EER AEB NFPE, wt loss, PO intake  >>on going  Goal: Pt will meet at least 75% of nutrient needs via feasible route / Show no further s/s of malnutrition    Recommendations:  1. Full liquid diet with Ensure Max TID (450 kcal, 90g pro)   >> When medically feasible, recommend advance to Low fiber diet with cont supplementation of soluble fiber to aid with forming stool.   2. Due to persistent malabsorption, recommend increasing TPN to meet 100% of est needs until outpt via ostomy improves. Recommend; 275g Dex, 90g AA, 50g SMOF lipids to provide 1795 kcal, 90g pro, GIR 2.93, 1.38 g/kg pro ABW. RD to follow.  >> Cont to trend TG weekly  3. Monitor Skin, Wts daily, GOC. Pain/GI per team.   >>Cont with imodium and metamucil per team  4. Labs: monitor BMP, CBC, glucose, lytes - Replete PRN, trend renal indices, LFts, POCT.  5. RD to remain available for additional Recs.    **Disc with team    Education:  Cont to encourage adequate PO intake as tolerated for pre-op nutrition optimization.     Risk Level: High [X   ] Moderate [   ] Low [   ].

## 2023-01-18 NOTE — PROGRESS NOTE ADULT - SUBJECTIVE AND OBJECTIVE BOX
OVERNIGHT EVENTS: MAYO    SUBJECTIVE / INTERVAL HPI: Patient seen and examined at bedside. Pt withdrawn, answers in few words. Denies chest pain, SOB, fevers, chills, abd pain.    12 point ROS negative other than stated above.     VITAL SIGNS:  Vital Signs Last 24 Hrs  T(C): 37.1 (18 Jan 2023 13:37), Max: 37.7 (18 Jan 2023 09:15)  T(F): 98.7 (18 Jan 2023 13:37), Max: 99.9 (18 Jan 2023 09:15)  HR: 96 (18 Jan 2023 15:57) (96 - 106)  BP: 131/64 (18 Jan 2023 15:57) (130/69 - 158/76)  BP(mean): 87 (18 Jan 2023 15:57) (87 - 103)  RR: 17 (18 Jan 2023 15:57) (16 - 18)  SpO2: 100% (18 Jan 2023 15:57) (96% - 100%)    Parameters below as of 18 Jan 2023 15:57  Patient On (Oxygen Delivery Method): room air        PHYSICAL EXAM:    General: NAD  HEENT: NC/AT, anicteric sclera; MMM  Neck: supple  Cardiovascular: +S1/S2; RRR  Respiratory: CTA B/L; no W/R/R  Gastrointestinal: soft, NT/ND; +BSx4  Extremities: +LLE dry gangrene wrapped  Vascular: 2+ radial, DP/PT pulses B/L  Neurological: AAOx3    MEDICATIONS:  MEDICATIONS  (STANDING):  aMIOdarone    Tablet 100 milliGRAM(s) Oral every 24 hours  ascorbic acid 500 milliGRAM(s) Oral daily  chlorhexidine 2% Cloths 1 Application(s) Topical <User Schedule>  chlorhexidine 2% Cloths 1 Application(s) Topical <User Schedule>  dextrose 5%. 1000 milliLiter(s) (50 mL/Hr) IV Continuous <Continuous>  dextrose 5%. 1000 milliLiter(s) (100 mL/Hr) IV Continuous <Continuous>  diphenoxylate/atropine 2 Tablet(s) Oral every 8 hours  enoxaparin Injectable 60 milliGRAM(s) SubCutaneous every 12 hours  fat emulsion (Fish Oil and Plant Based) 20% Infusion 0.3106 Gm/kG/Day (8.33 mL/Hr) IV Continuous <Continuous>  glucagon  Injectable 1 milliGRAM(s) IntraMuscular once  influenza  Vaccine (HIGH DOSE) 0.7 milliLiter(s) IntraMuscular once  insulin lispro (ADMELOG) corrective regimen sliding scale   SubCutaneous every 6 hours  lactated ringers. 1000 milliLiter(s) (50 mL/Hr) IV Continuous <Continuous>  loperamide 4 milliGRAM(s) Oral every 8 hours  metoprolol succinate ER 25 milliGRAM(s) Oral daily  mirtazapine 15 milliGRAM(s) Oral at bedtime  multivitamin 1 Tablet(s) Oral daily  pantoprazole    Tablet 40 milliGRAM(s) Oral two times a day  Parenteral Nutrition - Adult 1 Each (50 mL/Hr) TPN Continuous <Continuous>  Parenteral Nutrition - Adult 1 Each (83 mL/Hr) TPN Continuous <Continuous>  psyllium Powder 1 Packet(s) Oral every 8 hours  silver sulfADIAZINE 1% Cream 1 Application(s) Topical daily  zinc sulfate 220 milliGRAM(s) Oral daily    MEDICATIONS  (PRN):  acetaminophen     Tablet .. 650 milliGRAM(s) Oral every 6 hours PRN Temp greater or equal to 38C (100.4F), Mild Pain (1 - 3)  dextrose Oral Gel 15 Gram(s) Oral once PRN Blood Glucose LESS THAN 70 milliGRAM(s)/deciliter  melatonin 3 milliGRAM(s) Oral at bedtime PRN Insomnia  ondansetron Injectable 4 milliGRAM(s) IV Push every 6 hours PRN Nausea and/or Vomiting  oxyCODONE    IR 5 milliGRAM(s) Oral every 4 hours PRN Severe Pain (7 - 10)  sodium chloride 0.9% lock flush 10 milliLiter(s) IV Push every 1 hour PRN Pre/post blood products, medications, blood draw, and to maintain line patency      ALLERGIES:  Allergies    No Known Allergies    Intolerances        LABS:                        10.1   11.38 )-----------( 348      ( 18 Jan 2023 05:30 )             31.4     01-18    133<L>  |  99  |  14  ----------------------------<  129<H>  3.5   |  25  |  0.54    Ca    8.5      18 Jan 2023 05:30  Phos  2.8     01-18  Mg     1.8     01-18    TPro  7.0  /  Alb  2.7<L>  /  TBili  0.7  /  DBili  x   /  AST  41<H>  /  ALT  65<H>  /  AlkPhos  175<H>  01-17        CAPILLARY BLOOD GLUCOSE      POCT Blood Glucose.: 147 mg/dL (17 Jan 2023 17:10)      RADIOLOGY & ADDITIONAL TESTS: Reviewed.    ASSESSMENT:    PLAN:     Recommendations not final until attending attestation present

## 2023-01-18 NOTE — PROGRESS NOTE ADULT - SUBJECTIVE AND OBJECTIVE BOX
Subjective: Pt seen and evaluated by vascular team this AM. Pt complaining of left leg pain this morning. Pts dressing to LLE is C/D/I/    ROS:   Denies Headache, blurred vision, Chest Pain, SOB, Abdominal pain, nausea or vomiting     Social   aMIOdarone    Tablet 100  enoxaparin Injectable 60  metoprolol succinate ER 25      Allergies    No Known Allergies    Intolerances        Vital Signs Last 24 Hrs  T(C): 37.7 (18 Jan 2023 09:15), Max: 37.7 (18 Jan 2023 09:15)  T(F): 99.9 (18 Jan 2023 09:15), Max: 99.9 (18 Jan 2023 09:15)  HR: 100 (18 Jan 2023 08:37) (98 - 106)  BP: 130/69 (18 Jan 2023 08:37) (130/69 - 158/76)  BP(mean): 91 (18 Jan 2023 08:37) (91 - 103)  RR: 18 (18 Jan 2023 08:37) (16 - 18)  SpO2: 98% (18 Jan 2023 08:37) (97% - 100%)    Parameters below as of 18 Jan 2023 08:37  Patient On (Oxygen Delivery Method): room air      I&O's Summary    17 Jan 2023 07:01  -  18 Jan 2023 07:00  --------------------------------------------------------  IN: 3791.3 mL / OUT: 4450 mL / NET: -658.7 mL    18 Jan 2023 07:01  -  18 Jan 2023 09:58  --------------------------------------------------------  IN: 300 mL / OUT: 200 mL / NET: 100 mL        Physical Exam:  General: Ill-appearing man, resting in bed, NAD  C/V: NSR  Pulm: Nonlabored breathing, no respiratory distress  Abd: soft, nondistended, nontender. No rebound or guarding. Midline incision. Ostomy noted in RLQ   Extrem: LLE with dry gangrene, stable from prior exam, extending from toes to just below the knee      LABS:                        10.1   11.38 )-----------( 348      ( 18 Jan 2023 05:30 )             31.4     01-18    133<L>  |  99  |  14  ----------------------------<  129<H>  3.5   |  25  |  0.54    Ca    8.5      18 Jan 2023 05:30  Phos  2.8     01-18  Mg     1.8     01-18    TPro  7.0  /  Alb  2.7<L>  /  TBili  0.7  /  DBili  x   /  AST  41<H>  /  ALT  65<H>  /  AlkPhos  175<H>  01-17          A/P:  77y y/o Male with significant PMHx of IVDU found unresponsive at his nursing home, resolved after Narcan in the field, and brought to [facility]. Found to have PE, atrial flutter, and large LV thrombus on echo. Transferred to [facility] for further management. Pt C/o abdominal pain on 12/10 CTA showing mid SMA with embolus. Abnormal distal small bowel loops and cecum with dilatation and pneumatosis suggesting infarcted bowel. One or two tiny foci of  intrahepatic portal vein pneumatosis. Segmental infarction upper pole left kidney. Now s/p Ex lap, SMA embolectomy, 80cm SBR, abthera vac left in discontinuity (12/11) and transferred to SICU intubated. S/p second look (12/13) and most recently s/p OR for 3rd look, end-to-end anastomosis of remaining bowel, loop ileostomy and abdomen closure (12/15). Now stepped down with limb ischemia to LLE pending amputation. Ischemia has not yet fully demarcated, it is clear that the posterior calf (which is needed to heal a BKA flap) is involved and therefore only surgical option available to the patient is an AKA pending medical optimization.     Recommendations:  - If clinically feasible from primary team perspective, patient okay to be discharged, no vascular surgery intervention precluding discharge  - Patient can represent at another time when nutritional status is optimized for elective amputation  - Continue local wound care with betadine to all skin below the line of demarcation of his left shin and kerlix. No offloading boot, keep left heel elevated off bed.   - Continue supportive measures  - Rest of care per primary team   - Vascular surgery Team 3C will continue to follow. Please call [phone] with any questions or concerns

## 2023-01-18 NOTE — PROGRESS NOTE ADULT - SUBJECTIVE AND OBJECTIVE BOX
SUBJECTIVE: Pt seen and examined by chief resident. Pt is doing well, resting comfortably on bed. Pain controlled. No nausea or vomiting. No complaints at this time.    Vital Signs Last 24 Hrs  T(C): 36.8 (18 Jan 2023 04:56), Max: 38.8 (17 Jan 2023 08:50)  T(F): 98.2 (18 Jan 2023 04:56), Max: 101.8 (17 Jan 2023 08:50)  HR: 104 (18 Jan 2023 03:56) (98 - 106)  BP: 145/72 (18 Jan 2023 03:56) (140/68 - 158/76)  BP(mean): 97 (18 Jan 2023 03:56) (95 - 103)  RR: 16 (18 Jan 2023 03:56) (16 - 17)  SpO2: 99% (18 Jan 2023 03:56) (97% - 100%)    Parameters below as of 18 Jan 2023 03:56  Patient On (Oxygen Delivery Method): room air        I&O's Summary    17 Jan 2023 07:01  -  18 Jan 2023 07:00  --------------------------------------------------------  IN: 3799.6 mL / OUT: 4050 mL / NET: -250.4 mL        Physical Exam:  General Appearance: Appears well, NAD  Pulmonary: Nonlabored breathing, no respiratory distress  Abdomen: Soft, nondisteded, nontender, ostomy with output  Extremities: WWP, SCD's in place     LABS:                        10.1   11.38 )-----------( 348      ( 18 Jan 2023 05:30 )             31.4     01-18    133<L>  |  99  |  14  ----------------------------<  129<H>  3.5   |  25  |  0.54    Ca    8.5      18 Jan 2023 05:30  Phos  2.8     01-18  Mg     1.8     01-18    TPro  7.0  /  Alb  2.7<L>  /  TBili  0.7  /  DBili  x   /  AST  41<H>  /  ALT  65<H>  /  AlkPhos  175<H>  01-17

## 2023-01-18 NOTE — CHART NOTE - NSCHARTNOTEFT_GEN_A_CORE
The writer met with the patient for f/u individual psychotherapy for 30 minutes. The writer and patient processed the patient's response to his current hospitalization, with the writer providing psychoeducation on effective sleep hygiene. The patient denied any thoughts of self-harm. The patient expresses frustration related to his current hospitalization, though does not appear to be at imminent risk of harm to self or others.

## 2023-01-18 NOTE — PROGRESS NOTE ADULT - ASSESSMENT
77M with PMHx of IVDU found unresponsive at his nursing home, resolved after Narcan in the field and brought to [facility]. Found to have PE, atrial flutter, and large LV thrombus on echo. Transferred to [facility] for further management. Pt c/o abdominal pain on 12/10 CTA showing mid SMA with embolus. Abnormal distal small bowel loops and cecum with dilatation and pneumatosis suggesting infarcted bowel. One or two tiny foci of intrahepatic portal vein pneumatosis. Segmental infarction upper pole left kidney. Now s/p Ex lap, SMA embolectomy, 80cm SBR, abthera vac left in discontinuity (12/11) and transferred to SICU intubated. S/p second look (12/13) and most recently s/p OR for 3rd look, end-to-end anastomosis of remaining bowel, loop ileostomy and abdomen closure (12/15). LLE ischemia, AKA delayed by nutritional optimization.    FLD + ensures, TPN  Pain/nausea control  CTAP with PO and IV contrast  A flutter now s/p cardioversion, amio 100 QD, toprolol XL 25 QD  LV thrombus w LLE limb ischemia- Heparin gtt changed to SQL BID  AKA likely outpatient  AM labs

## 2023-01-18 NOTE — PROGRESS NOTE ADULT - ASSESSMENT
78 y/o M first presented to [facility] from [facility] due to unresponsiveness (responded to Narcan). At [facility], found to have subsegmental pulmonary embolism in RUL, rapid atrial flutter with severely reduced LVEF with LV thrombus. Transferred to [facility] on 12/7/22 for LORENZO and possible ablation. At [facility], was found to have left leg ischemia (left leg arterial thrombus on LE duplex on 12/8). Was being considered for rhythm control when he developed abdominal pain, CTA (12/10/22) showed embolus in SMA, bowel infarct, requiring ex-lap on 12/11 with SMA embolectomy, 80cm small bowel resection; On 12/13, underwent 2nd look laparotomy, with 80cm small bowel resection, closure with abthera. On 12/15, underwent 3rd look laparotomy, end-to-end anastomosis of remaining bowel, loop ileostomy and abdominal closure. Now extubated, on tele floor. Eventually underwent LORENZO/DCCV on 12/30/22, now in sinus rhythm. Currently being evaluated for possible above knee amputation for LLE ischemia (possibly deferring till next hospitalization, after optimization of nutritional status)      #Fever  Patient with worsening WBC, afebrile over last 24h. Infectious work-up in progress.  Appreciate ID      # Left leg ischemia -  Decision re: timing of Above Knee Amputation per vascular surgery and primary team. Optimizing nutritional status prior to surgery     #Nausea and vomiting ( resolved)     #Suicide Ideation  Psych f/u appreciated    #Insomnia   Continue Mirtazapine 15mg po qhs for insomnia    #Transaminitis   Improving   Continue to trend     # Acute Systolic Heart Failure   Continue Metoprolol succinate 25mg qd     # Atrial Flutter  s/p DCCV 12/30. EP recommending uninterrupted AC x 30 days   Continue enoxaparin 60mg SQ q12  Continue amiodarone 100mg qd and metoprolol succinate 25 qd     # Pulmonary embolism (subsegmental RUL)   After completion of AC for DCCV, would need to continue AC for PE  Continue lovenox    # Bowel ischemia - s/p SMA embolectomy; Small bowel resection 80 cm + 40 cm; ileostomy in place; management per primary team   Monitor ileostomy output on loperamide 4mg TID, tincture of opium QID     # Severe protein-calorie malnutrition   Continue TPN     # Sacral wound  continue off loading, dressings    #GI /DVT PPX  -Continue pantoprazole 40mg PO BID   -Continue lovenox     Rest per primary team. Medicine service remains available to assist with any questions or concerns.

## 2023-01-18 NOTE — PROGRESS NOTE ADULT - ATTENDING COMMENTS
Remains afebrile >24h and asymptomatic.  Flu and COVID both negative. BCx remains negative.  So far no clear source of fever.  Could be due to resolving hematoma.  Team to remove PICC today - no need to send for tip culture.  Monitor off abx.  f/u BCx.    Team 1 will follow you.  Dr. Stewart will take over the care tomorrow.  Case d/w primary team.    Shraddha Jerome MD, MS  Infectious Disease attending  work cell [phone]   For any questions during evening/weekend/holiday, please page ID on call

## 2023-01-19 NOTE — PROGRESS NOTE ADULT - SUBJECTIVE AND OBJECTIVE BOX
SUBJECTIVE:  Patient seen and examined on AM rounds. No acute events overnight. This morning, he is feeling well. Tolerating FLD and PPN. Denies nausea, vomiting, fever, and chills. Voiding without issue. Passing gas and having BMs.     MEDICATIONS  (STANDING):  aMIOdarone    Tablet 100 milliGRAM(s) Oral every 24 hours  ascorbic acid 500 milliGRAM(s) Oral daily  chlorhexidine 2% Cloths 1 Application(s) Topical <User Schedule>  chlorhexidine 2% Cloths 1 Application(s) Topical <User Schedule>  dextrose 5%. 1000 milliLiter(s) (50 mL/Hr) IV Continuous <Continuous>  dextrose 5%. 1000 milliLiter(s) (100 mL/Hr) IV Continuous <Continuous>  diphenoxylate/atropine 2 Tablet(s) Oral every 8 hours  enoxaparin Injectable 60 milliGRAM(s) SubCutaneous every 12 hours  fat emulsion (Fish Oil and Plant Based) 20% Infusion 0.3106 Gm/kG/Day (8.33 mL/Hr) IV Continuous <Continuous>  glucagon  Injectable 1 milliGRAM(s) IntraMuscular once  influenza  Vaccine (HIGH DOSE) 0.7 milliLiter(s) IntraMuscular once  insulin lispro (ADMELOG) corrective regimen sliding scale   SubCutaneous every 6 hours  lactated ringers. 1000 milliLiter(s) (50 mL/Hr) IV Continuous <Continuous>  loperamide 4 milliGRAM(s) Oral every 8 hours  metoprolol succinate ER 25 milliGRAM(s) Oral daily  mirtazapine 15 milliGRAM(s) Oral at bedtime  multivitamin 1 Tablet(s) Oral daily  pantoprazole    Tablet 40 milliGRAM(s) Oral two times a day  Parenteral Nutrition - Adult 1 Each (83 mL/Hr) TPN Continuous <Continuous>  psyllium Powder 1 Packet(s) Oral every 8 hours  silver sulfADIAZINE 1% Cream 1 Application(s) Topical daily  zinc sulfate 220 milliGRAM(s) Oral daily    MEDICATIONS  (PRN):  acetaminophen     Tablet .. 650 milliGRAM(s) Oral every 6 hours PRN Temp greater or equal to 38C (100.4F), Mild Pain (1 - 3)  dextrose Oral Gel 15 Gram(s) Oral once PRN Blood Glucose LESS THAN 70 milliGRAM(s)/deciliter  melatonin 3 milliGRAM(s) Oral at bedtime PRN Insomnia  ondansetron Injectable 4 milliGRAM(s) IV Push every 6 hours PRN Nausea and/or Vomiting  oxyCODONE    IR 5 milliGRAM(s) Oral every 4 hours PRN Severe Pain (7 - 10)  sodium chloride 0.9% lock flush 10 milliLiter(s) IV Push every 1 hour PRN Pre/post blood products, medications, blood draw, and to maintain line patency      Vital Signs Last 24 Hrs  T(C): 36.5 (19 Jan 2023 05:00), Max: 37.7 (18 Jan 2023 09:15)  T(F): 97.7 (19 Jan 2023 05:00), Max: 99.9 (18 Jan 2023 09:15)  HR: 100 (19 Jan 2023 04:40) (96 - 106)  BP: 141/72 (19 Jan 2023 04:40) (127/72 - 141/72)  BP(mean): 95 (19 Jan 2023 04:40) (87 - 95)  RR: 18 (19 Jan 2023 04:40) (17 - 18)  SpO2: 99% (19 Jan 2023 04:40) (96% - 100%)    Parameters below as of 19 Jan 2023 04:40  Patient On (Oxygen Delivery Method): room air    Physical Exam:  General: NAD, resting comfortably in bed  Pulmonary: Nonlabored breathing, no respiratory distress  Cardiovascular: NSR  Abdominal: soft, NT/ND, ostomy with output  Extremities: WWP, normal strength    I&O's Summary    18 Jan 2023 07:01  -  19 Jan 2023 07:00  --------------------------------------------------------  IN: 3189.8 mL / OUT: 2245 mL / NET: 944.8 mL        LABS:                        10.1   11.38 )-----------( 348      ( 18 Jan 2023 05:30 )             31.4     01-18    133<L>  |  99  |  14  ----------------------------<  129<H>  3.5   |  25  |  0.54    Ca    8.5      18 Jan 2023 05:30  Phos  2.8     01-18  Mg     1.8     01-18

## 2023-01-19 NOTE — BH CONSULTATION LIAISON PROGRESS NOTE - NSBHFUPINTERVALHXFT_PSY_A_CORE
Psychiatry f/u for management of depressive sx. Interim hx reviewed - no acute events. Receiving mirtazapine QHS. Observed to be sleeping during the day.    On interview this morning, pt found asleep, arousable to voice, immediately requesting to be left to sleep. Reports ongoing depression. Denies any new complaints. No SI or HI. No AVH. Agreeable to meet with psychology staff later.

## 2023-01-19 NOTE — PROGRESS NOTE ADULT - ASSESSMENT
76 y/o male with no significant past medical history BIBA from [facility] due to unresponsiveness. Initially admitted to CCU for LV thrombus, course c/b bowel ischemia s/p multiple interventions and LLE ischemia that requires AKA. ID consulted for new fevers. Do not suspect bacterial infection as he has dry gangrene, sacral wound looks clean, and culture data is NGTD. COVID-19 and flu negative. Procal and CRP negative. Suspect fever is due to resolving hematoma.     Recommendation:  - monitor off abx  - check respiratory viral panel  - draw blood cultures today 1/19    ID Team 1 will follow with you. Recommendations not final until attending attestation present.

## 2023-01-19 NOTE — PROGRESS NOTE ADULT - SUBJECTIVE AND OBJECTIVE BOX
OVERNIGHT EVENTS: T101    SUBJECTIVE / INTERVAL HPI: Patient seen and examined at bedside. Pt does not report new symptoms. Denies chest pain, SOB, fevers, chills, abd pain.    12 point ROS negative other than stated above.     VITAL SIGNS:  Vital Signs Last 24 Hrs  T(C): 38.4 (19 Jan 2023 09:17), Max: 38.4 (19 Jan 2023 09:17)  T(F): 101.1 (19 Jan 2023 09:17), Max: 101.1 (19 Jan 2023 09:17)  HR: 88 (19 Jan 2023 11:48) (88 - 106)  BP: 118/65 (19 Jan 2023 11:48) (118/65 - 141/72)  BP(mean): 85 (19 Jan 2023 11:48) (85 - 99)  RR: 18 (19 Jan 2023 11:48) (17 - 18)  SpO2: 99% (19 Jan 2023 11:48) (99% - 100%)    Parameters below as of 19 Jan 2023 11:48  Patient On (Oxygen Delivery Method): room air        PHYSICAL EXAM:    General: NAD; withdrawn  HEENT: NC/AT, anicteric sclera; MMM  Neck: supple  Cardiovascular: +S1/S2; RRR  Respiratory: CTA B/L; no W/R/R  Gastrointestinal: soft, NT/ND; +BSx4  Extremities: LLE dry gangrene  Vascular: 2+ radial, DP/PT pulses B/L  Neurological: AAOx3    MEDICATIONS:  MEDICATIONS  (STANDING):  aMIOdarone    Tablet 100 milliGRAM(s) Oral every 24 hours  ascorbic acid 500 milliGRAM(s) Oral daily  chlorhexidine 2% Cloths 1 Application(s) Topical <User Schedule>  chlorhexidine 2% Cloths 1 Application(s) Topical <User Schedule>  dextrose 5%. 1000 milliLiter(s) (50 mL/Hr) IV Continuous <Continuous>  dextrose 5%. 1000 milliLiter(s) (100 mL/Hr) IV Continuous <Continuous>  diphenoxylate/atropine 2 Tablet(s) Oral every 8 hours  enoxaparin Injectable 60 milliGRAM(s) SubCutaneous every 12 hours  fat emulsion (Fish Oil and Plant Based) 20% Infusion 0.3106 Gm/kG/Day (8.33 mL/Hr) IV Continuous <Continuous>  glucagon  Injectable 1 milliGRAM(s) IntraMuscular once  influenza  Vaccine (HIGH DOSE) 0.7 milliLiter(s) IntraMuscular once  insulin lispro (ADMELOG) corrective regimen sliding scale   SubCutaneous every 6 hours  lactated ringers. 1000 milliLiter(s) (50 mL/Hr) IV Continuous <Continuous>  loperamide 4 milliGRAM(s) Oral every 8 hours  metoprolol succinate ER 25 milliGRAM(s) Oral daily  mirtazapine 15 milliGRAM(s) Oral at bedtime  multivitamin 1 Tablet(s) Oral daily  pantoprazole    Tablet 40 milliGRAM(s) Oral two times a day  Parenteral Nutrition - Adult 1 Each (83 mL/Hr) TPN Continuous <Continuous>  Parenteral Nutrition - Adult 1 Each (83 mL/Hr) TPN Continuous <Continuous>  psyllium Powder 1 Packet(s) Oral every 8 hours  silver sulfADIAZINE 1% Cream 1 Application(s) Topical daily  zinc sulfate 220 milliGRAM(s) Oral daily    MEDICATIONS  (PRN):  acetaminophen     Tablet .. 650 milliGRAM(s) Oral every 6 hours PRN Temp greater or equal to 38C (100.4F), Mild Pain (1 - 3)  dextrose Oral Gel 15 Gram(s) Oral once PRN Blood Glucose LESS THAN 70 milliGRAM(s)/deciliter  melatonin 3 milliGRAM(s) Oral at bedtime PRN Insomnia  ondansetron Injectable 4 milliGRAM(s) IV Push every 6 hours PRN Nausea and/or Vomiting  oxyCODONE    IR 5 milliGRAM(s) Oral every 4 hours PRN Severe Pain (7 - 10)  sodium chloride 0.9% lock flush 10 milliLiter(s) IV Push every 1 hour PRN Pre/post blood products, medications, blood draw, and to maintain line patency      ALLERGIES:  Allergies    No Known Allergies    Intolerances        LABS:                        10.0   11.16 )-----------( 317      ( 19 Jan 2023 09:03 )             31.3     01-19    134<L>  |  100  |  12  ----------------------------<  139<H>  4.1   |  27  |  0.59    Ca    8.6      19 Jan 2023 09:03  Phos  3.4     01-19  Mg     1.8     01-19    TPro  6.9  /  Alb  2.5<L>  /  TBili  0.7  /  DBili  x   /  AST  31  /  ALT  56<H>  /  AlkPhos  175<H>  01-19        CAPILLARY BLOOD GLUCOSE      POCT Blood Glucose.: 147 mg/dL (17 Jan 2023 17:10)      RADIOLOGY & ADDITIONAL TESTS: Reviewed.    ASSESSMENT:    PLAN:     Recommendations not final until attending attestation present

## 2023-01-19 NOTE — PROGRESS NOTE ADULT - ASSESSMENT
76 y/o M first presented to [facility] from [facility] due to unresponsiveness (responded to Narcan). At [facility], found to have subsegmental pulmonary embolism in RUL, rapid atrial flutter with severely reduced LVEF with LV thrombus. Transferred to [facility] on 12/7/22 for LORENZO and possible ablation. At [facility], was found to have left leg ischemia (left leg arterial thrombus on LE duplex on 12/8). Was being considered for rhythm control when he developed abdominal pain, CTA (12/10/22) showed embolus in SMA, bowel infarct, requiring ex-lap on 12/11 with SMA embolectomy, 80cm small bowel resection; On 12/13, underwent 2nd look laparotomy, with 80cm small bowel resection, closure with abthera. On 12/15, underwent 3rd look laparotomy, end-to-end anastomosis of remaining bowel, loop ileostomy and abdominal closure. Now extubated, on tele floor. Eventually underwent LORENZO/DCCV on 12/30/22, now in sinus rhythm. Currently being evaluated for possible above knee amputation for LLE ischemia (possibly deferring till next hospitalization, after optimization of nutritional status)      #Fever  Low grade elevation in WBC ;   Febrile today >101F ; RVP negative ;   [ ] agree with ID recs to repeat blood cultures     # Left leg ischemia -  Decision re: timing of Above Knee Amputation per vascular surgery and primary team. Optimizing nutritional status prior to surgery     #Nausea and vomiting ( resolved)     #Suicide Ideation  Psych f/u appreciated    #Insomnia   Continue Mirtazapine 15mg po qhs for insomnia    #Elevated liver tests  Improving   Continue to trend     # Acute Systolic Heart Failure   Continue Metoprolol succinate 25mg qd     # Atrial Flutter  s/p DCCV 12/30. EP recommending uninterrupted AC x 30 days   Continue enoxaparin 60mg SQ q12  Continue amiodarone 100mg qd and metoprolol succinate 25 qd     # Pulmonary embolism (subsegmental RUL)   After completion of AC for DCCV, would need to continue AC for PE  Continue lovenox    # Bowel ischemia - s/p SMA embolectomy; Small bowel resection 80 cm + 40 cm; ileostomy in place; management per primary team   Monitor ileostomy output on loperamide 4mg TID, diphenoxylate atropine    # Severe protein-calorie malnutrition   Continue PPN through peripheral IV     # Sacral wound  continue off loading, dressings    #GI /DVT PPX  -Continue pantoprazole 40mg PO BID   -Continue lovenox     #Hyponatremia   Sodium, Serum: 134 mmol/L (01-19-23 @ 09:03)  Sodium, Serum: 133 mmol/L (01-18-23 @ 05:30)  Likely mild hypovolemic hyponatremia ; continue PPN, encourage PO intake. Cautious with any exogenous fluids given low EF    Rest per primary team. Medicine service remains available to assist with any questions or concerns.

## 2023-01-19 NOTE — PROGRESS NOTE ADULT - SUBJECTIVE AND OBJECTIVE BOX
Subjective: Febrile to 101.1 this AM, Pt seen and examined by vascular this AM. Pt complaining of pain to his Left leg near the knee during dressing change this morning.     ROS:   Denies Headache, blurred vision, Chest Pain, SOB, Abdominal pain, nausea or vomiting     Social   aMIOdarone    Tablet 100  enoxaparin Injectable 60  metoprolol succinate ER 25      Allergies    No Known Allergies    Intolerances        Vital Signs Last 24 Hrs  T(C): 38.4 (19 Jan 2023 09:17), Max: 38.4 (19 Jan 2023 09:17)  T(F): 101.1 (19 Jan 2023 09:17), Max: 101.1 (19 Jan 2023 09:17)  HR: 96 (19 Jan 2023 08:27) (96 - 106)  BP: 135/78 (19 Jan 2023 08:27) (127/72 - 141/72)  BP(mean): 99 (19 Jan 2023 08:27) (87 - 99)  RR: 18 (19 Jan 2023 08:27) (17 - 18)  SpO2: 99% (19 Jan 2023 08:27) (96% - 100%)    Parameters below as of 19 Jan 2023 08:27  Patient On (Oxygen Delivery Method): room air      I&O's Summary    18 Jan 2023 07:01  -  19 Jan 2023 07:00  --------------------------------------------------------  IN: 4014.7 mL / OUT: 2245 mL / NET: 1769.7 mL    19 Jan 2023 07:01  -  19 Jan 2023 09:25  --------------------------------------------------------  IN: 141.3 mL / OUT: 0 mL / NET: 141.3 mL        Physical Exam:  General: Ill-appearing man, resting in bed, NAD  C/V: NSR  Pulm: Nonlabored breathing, no respiratory distress  Extrem: LLE with dry gangrene extending from toes to just below the knee, dry skin sloughing from previous blisters present       LABS:                        10.1   11.38 )-----------( 348      ( 18 Jan 2023 05:30 )             31.4     01-18    133<L>  |  99  |  14  ----------------------------<  129<H>  3.5   |  25  |  0.54    Ca    8.5      18 Jan 2023 05:30  Phos  2.8     01-18  Mg     1.8     01-18

## 2023-01-19 NOTE — PROGRESS NOTE ADULT - ASSESSMENT
77M with PMHx of IVDU found unresponsive at his nursing home, resolved after Narcan in the field and brought to [facility]. Found to have PE, atrial flutter, and large LV thrombus on echo. Transferred to [facility] for further management. Pt c/o abdominal pain on 12/10 CTA showing mid SMA with embolus. Abnormal distal small bowel loops and cecum with dilatation and pneumatosis suggesting infarcted bowel. One or two tiny foci of intrahepatic portal vein pneumatosis. Segmental infarction upper pole left kidney. Now s/p Ex lap, SMA embolectomy, 80cm SBR, abthera vac left in discontinuity (12/11) and transferred to SICU intubated. S/p second look (12/13) and most recently s/p OR for 3rd look, end-to-end anastomosis of remaining bowel, loop ileostomy and abdomen closure (12/15). LLE ischemia, AKA delayed by nutritional optimization.    FLD + ensures, PPN  Pain/nausea control  CTAP with PO and IV contrast  A flutter now s/p cardioversion, amio 100 QD, toprolol XL 25 QD  LV thrombus w LLE limb ischemia- Heparin gtt changed to SQL BID  AKA likely outpatient  AM labs

## 2023-01-19 NOTE — PROGRESS NOTE ADULT - SUBJECTIVE AND OBJECTIVE BOX
Patient is a 77y old  Male who presents with a chief complaint of A flutter (19 Jan 2023 12:00)    INTERVAL EVENTS:  Febrile today; RVP negative ; rechecking blood cultures   tolerating full liquid diet     SUBJECTIVE:  Patient was seen and examined at bedside.\  Review of systems: +fever this AM; no dyspnea, nausea or vomiting, LE edema.     MEDICATIONS:  MEDICATIONS  (STANDING):  aMIOdarone    Tablet 100 milliGRAM(s) Oral every 24 hours  ascorbic acid 500 milliGRAM(s) Oral daily  chlorhexidine 2% Cloths 1 Application(s) Topical <User Schedule>  chlorhexidine 2% Cloths 1 Application(s) Topical <User Schedule>  dextrose 5%. 1000 milliLiter(s) (50 mL/Hr) IV Continuous <Continuous>  dextrose 5%. 1000 milliLiter(s) (100 mL/Hr) IV Continuous <Continuous>  diphenoxylate/atropine 2 Tablet(s) Oral every 8 hours  enoxaparin Injectable 60 milliGRAM(s) SubCutaneous every 12 hours  fat emulsion (Fish Oil and Plant Based) 20% Infusion 0.3106 Gm/kG/Day (8.33 mL/Hr) IV Continuous <Continuous>  glucagon  Injectable 1 milliGRAM(s) IntraMuscular once  influenza  Vaccine (HIGH DOSE) 0.7 milliLiter(s) IntraMuscular once  insulin lispro (ADMELOG) corrective regimen sliding scale   SubCutaneous every 6 hours  lactated ringers. 1000 milliLiter(s) (50 mL/Hr) IV Continuous <Continuous>  loperamide 4 milliGRAM(s) Oral every 8 hours  metoprolol succinate ER 25 milliGRAM(s) Oral daily  mirtazapine 15 milliGRAM(s) Oral at bedtime  multivitamin 1 Tablet(s) Oral daily  pantoprazole    Tablet 40 milliGRAM(s) Oral two times a day  Parenteral Nutrition - Adult 1 Each (83 mL/Hr) TPN Continuous <Continuous>  Parenteral Nutrition - Adult 1 Each (83 mL/Hr) TPN Continuous <Continuous>  psyllium Powder 1 Packet(s) Oral every 8 hours  silver sulfADIAZINE 1% Cream 1 Application(s) Topical daily  zinc sulfate 220 milliGRAM(s) Oral daily    MEDICATIONS  (PRN):  acetaminophen     Tablet .. 650 milliGRAM(s) Oral every 6 hours PRN Temp greater or equal to 38C (100.4F), Mild Pain (1 - 3)  dextrose Oral Gel 15 Gram(s) Oral once PRN Blood Glucose LESS THAN 70 milliGRAM(s)/deciliter  melatonin 3 milliGRAM(s) Oral at bedtime PRN Insomnia  ondansetron Injectable 4 milliGRAM(s) IV Push every 6 hours PRN Nausea and/or Vomiting  oxyCODONE    IR 5 milliGRAM(s) Oral every 4 hours PRN Severe Pain (7 - 10)  sodium chloride 0.9% lock flush 10 milliLiter(s) IV Push every 1 hour PRN Pre/post blood products, medications, blood draw, and to maintain line patency    Allergies  No Known Allergies    Intolerances    OBJECTIVE:  Vital Signs Last 24 Hrs  T(C): 37.3 (19 Jan 2023 18:00), Max: 38.4 (19 Jan 2023 09:17)  T(F): 99.2 (19 Jan 2023 18:00), Max: 101.1 (19 Jan 2023 09:17)  HR: 80 (19 Jan 2023 15:47) (80 - 106)  BP: 142/65 (19 Jan 2023 15:47) (118/65 - 142/65)  BP(mean): 93 (19 Jan 2023 15:47) (85 - 99)  RR: 18 (19 Jan 2023 15:47) (18 - 18)  SpO2: 99% (19 Jan 2023 15:47) (99% - 99%)    Parameters below as of 19 Jan 2023 15:47  Patient On (Oxygen Delivery Method): room air    I&O's Summary    18 Jan 2023 07:01  -  19 Jan 2023 07:00  --------------------------------------------------------  IN: 4014.7 mL / OUT: 2245 mL / NET: 1769.7 mL    19 Jan 2023 07:01  -  19 Jan 2023 21:00  --------------------------------------------------------  IN: 1205.3 mL / OUT: 750 mL / NET: 455.3 mL    PHYSICAL EXAM:  General: awake, alert, NAD, looking comfortable, speaking in full sentences, no labored breathing on RA  HEENT: AT/NC, no facial asymmetry   Lungs: poor inspiration, no crackles, no wheezes  Heart: RRR  Abdomen: soft  Extremities: left leg with dry gangrene in dressing, no visible tenderness  LABS:                        10.0   11.16 )-----------( 317      ( 19 Jan 2023 09:03 )             31.3     01-19    134<L>  |  100  |  12  ----------------------------<  139<H>  4.1   |  27  |  0.59    Ca    8.6      19 Jan 2023 09:03  Phos  3.4     01-19  Mg     1.8     01-19    TPro  6.9  /  Alb  2.5<L>  /  TBili  0.7  /  DBili  x   /  AST  31  /  ALT  56<H>  /  AlkPhos  175<H>  01-19    LIVER FUNCTIONS - ( 19 Jan 2023 09:03 )  Alb: 2.5 g/dL / Pro: 6.9 g/dL / ALK PHOS: 175 U/L / ALT: 56 U/L / AST: 31 U/L / GGT: x             CAPILLARY BLOOD GLUCOSE            MICRODATA:      RADIOLOGY/OTHER STUDIES:

## 2023-01-19 NOTE — BH CONSULTATION LIAISON PROGRESS NOTE - OTHER
attentive on brief assessment, unable to fully assess concentration grossly normal stable posture in bed minimal expressed content, focused on desire for sleep. no voiced delusions or paranoia. No SI or HI fatigued

## 2023-01-19 NOTE — PROGRESS NOTE ADULT - ASSESSMENT
A/P: 77y y/o Male with significant PMHx of IVDU found unresponsive at his nursing home, resolved after Narcan in the field, and brought to [facility]. Found to have PE, atrial flutter, and large LV thrombus on echo. Transferred to [facility] for further management. Pt C/o abdominal pain on 12/10 CTA showing mid SMA with embolus. Abnormal distal small bowel loops and cecum with dilatation and pneumatosis suggesting infarcted bowel. One or two tiny foci of  intrahepatic portal vein pneumatosis. Segmental infarction upper pole left kidney. Now s/p Ex lap, SMA embolectomy, 80cm SBR, abthera vac left in discontinuity (12/11) and transferred to SICU intubated. S/p second look (12/13) and most recently s/p OR for 3rd look, end-to-end anastomosis of remaining bowel, loop ileostomy and abdomen closure (12/15).  LLE Ischemia has not yet fully demarcated, it is clear that the posterior calf (which is needed to heal a BKA flap) is involved and therefore only surgical option available to the patient is an AKA pending medical optimization. LLE gangrene dry at this time.     Recommendations:  - If clinically feasible from primary team perspective, patient okay to be discharged, no vascular surgery intervention precluding discharge  - Patient can represent at another time when nutritional status is optimized for elective amputation  - Continue daily local wound care with betadine to all skin below the line of demarcation of his left shin and kerlix. No offloading boot, keep left heel elevated off bed.   - Continue supportive measures  - Rest of care per primary team   - Vascular surgery Team 3C will continue to follow. Please call [phone] with any questions or concerns

## 2023-01-19 NOTE — BH CONSULTATION LIAISON PROGRESS NOTE - NSBHASSESSMENTFT_PSY_ALL_CORE
77 year old male with history of substance use and no significant past medical history (poor medical follow up, last PCP visit 20 years prior to admission), presenting with unresponsiveness from nursing home to [facility], found to be in Aflutter w RVR with subsegmental PE RUL, transferred to [facility], found to have LV thrombus and worsening HF, EF 10-15%. 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 Course further complicated by LLE pulselessness and ulcerations that will ultimately require BKA. Psychiatry consulted due to depressed mood and suicidality.     The patient presents with ongoing depression, no subjective improvement with initiation of mirtazapine last week though tolerating well. Pt continues to endorse hopelessness, frustration, and intermittent passive SI, no SI present on exam today, never any intent or plan. Continues with low energy, difficulty initiating sleep at night though observed with daytime sleep. No evidence of tyler, internal response to stimuli, or other psychotic phenomena. At the present time the patient does not appear to be at imminent risk of harm to self. No aggressive ideation/behavior noted or reported.     :::Recommendations:::  - No need for 1:1 observation for suicidality. Consider supervised room per nursing protocol.  - Recommend increase in mirtazapine to 30mg QHS for ongoing depressive sx and subjective nighttime insomnia  -Encourage sleep hygiene and limiting sleep during the day  - When medically possible, group medical visits/interventions together such that the patient has extended periods of uninterrupted sleep. Impaired sleep has a direct relationship with the development of irritability and mood disorders.   -pain management as per primary team  - The patient greatly enjoys playing chess; a chess board is available through [employer] (4th Floor [employer]); the patient is aware that he can request the board be brought.  -Psychology will follow up for individual psychotherapy  -Contact CL with any questions/concerns  - No psychiatric contraindications to discharge

## 2023-01-20 NOTE — PROGRESS NOTE ADULT - ASSESSMENT
78 y/o male with no significant past medical history BIBA from [facility] due to unresponsiveness. Initially admitted to CCU for LV thrombus, course c/b bowel ischemia s/p multiple interventions and LLE ischemia that requires AKA. ID consulted for new fevers. He has dry gangrene, sacral wound looks clean, and culture data is NGTD. COVID-19 and flu negative. Procal and CRP negative. Patient has been refusing further lab draws.    Recommendation:  - monitor off abx  - attempt to send blood cultures and document if patient continues refusing  - recommend palliative re-consult to see if further workup still within GOC  - consider non-infectious causes for fever such as drug fever    ID will sign off. Please re-consult if new blood cultures are positive. Recommendations not final until attending attestation present.

## 2023-01-20 NOTE — PROVIDER CONTACT NOTE (OTHER) - ASSESSMENT
Pt. febrile on assessment. Tachy to low 100s (baseline). Normotensive. Pt. reports not feeling warm. Pt. refused blood cultures during previous shift following identification of earlier fever.

## 2023-01-20 NOTE — PROGRESS NOTE ADULT - ASSESSMENT
76 y/o M first presented to [facility] from [facility] due to unresponsiveness (responded to Narcan). At [facility], found to have subsegmental pulmonary embolism in RUL, rapid atrial flutter with severely reduced LVEF with LV thrombus. Transferred to [facility] on 12/7/22 for LORENZO and possible ablation. At [facility], was found to have left leg ischemia (left leg arterial thrombus on LE duplex on 12/8). Was being considered for rhythm control when he developed abdominal pain, CTA (12/10/22) showed embolus in SMA, bowel infarct, requiring ex-lap on 12/11 with SMA embolectomy, 80cm small bowel resection; On 12/13, underwent 2nd look laparotomy, with 80cm small bowel resection, closure with abthera. On 12/15, underwent 3rd look laparotomy, end-to-end anastomosis of remaining bowel, loop ileostomy and abdominal closure. Now extubated, on tele floor. Eventually underwent LORENZO/DCCV on 12/30/22, now in sinus rhythm. Currently being evaluated for possible above knee amputation for LLE ischemia (possibly deferring till next hospitalization, after optimization of nutritional status)      #Fever  Low grade elevation in WBC ;   Febrile again today >101F ; RVP negative ;   [ ] agree with ID recs to repeat blood cultures     # Left leg ischemia -  Decision re: timing of Above Knee Amputation per vascular surgery and primary team. Optimizing nutritional status prior to surgery   Per cardiology, needs 30 days of full AC after DCCV (11AM Jan 29th will be 30 days after DCCV)   If patient is still inpatient on Jan 29th, consider reconsulting vascular for amputation.     #Nausea and vomiting ( resolved)     #Suicidal Ideation  Psych f/u appreciated    #Insomnia   Continue Mirtazapine 15mg po qhs for insomnia    #Elevated liver tests  Improving; Continue to trend     # Acute Systolic Heart Failure   Continue Metoprolol succinate 25mg qd     # Atrial Flutter  s/p DCCV 12/30. EP recommending uninterrupted AC x 30 days ; * 11AM January 29th would be 30 days post DCCV.   Continue enoxaparin 60mg SQ q12  Continue amiodarone 100mg qd and metoprolol succinate 25 qd     # Pulmonary embolism (subsegmental RUL)   After completion of AC for DCCV, would need to continue AC for PE  Continue lovenox    # Bowel ischemia - s/p SMA embolectomy; Small bowel resection 80 cm + 40 cm; ileostomy in place; management per primary team   Monitor ileostomy output on loperamide 4mg TID, diphenoxylate atropine    # Severe protein-calorie malnutrition   Continue PPN through peripheral IV     # Sacral wound  continue off loading, dressings    #GI /DVT PPX  -Continue pantoprazole 40mg PO BID   -Continue lovenox     #Hyponatremia   Sodium, Serum: 134 mmol/L (01-19-23 @ 09:03)  Sodium, Serum: 133 mmol/L (01-18-23 @ 05:30)  Likely mild hypovolemic hyponatremia ; continue PPN, encourage PO intake. Cautious with any exogenous fluids given low EF  f/u repeat serum Na.     DVT ppx --- already on lovenox at full AC dose.     Rest per primary team. 76 y/o M first presented to [facility] from [facility] due to unresponsiveness (responded to Narcan). At [facility], found to have subsegmental pulmonary embolism in RUL, rapid atrial flutter with severely reduced LVEF with LV thrombus. Transferred to [facility] on 12/7/22 for LORENZO and possible ablation. At [facility], was found to have left leg ischemia (left leg arterial thrombus on LE duplex on 12/8). Was being considered for rhythm control when he developed abdominal pain, CTA (12/10/22) showed embolus in SMA, bowel infarct, requiring ex-lap on 12/11 with SMA embolectomy, 80cm small bowel resection; On 12/13, underwent 2nd look laparotomy, with 80cm small bowel resection, closure with abthera. On 12/15, underwent 3rd look laparotomy, end-to-end anastomosis of remaining bowel, loop ileostomy and abdominal closure. Now extubated, on tele floor. Eventually underwent LORENZO/DCCV on 12/30/22, now in sinus rhythm. Currently being evaluated for possible above knee amputation for LLE ischemia (possibly deferring till next hospitalization, after optimization of nutritional status)      #Fever  Low grade elevation in WBC ;   Febrile again today >101F ; RVP negative ;   [ ] check sacral area daily   [ ] agree with ID recs to repeat blood cultures , and add on cell diff  If unable to identify localizing signs of infection, then fevers possibly 2/2 resolving hematoma     # Left leg ischemia -  Decision re: timing of Above Knee Amputation per vascular surgery and primary team. Optimizing nutritional status prior to surgery   Per cardiology, needs 30 days of full AC after DCCV (11AM Jan 29th will be 30 days after DCCV)   If patient is still inpatient on Jan 29th, consider reconsulting vascular for amputation.     #Nausea and vomiting ( resolved)     #Suicidal Ideation  Psych f/u appreciated    #Insomnia   Continue Mirtazapine 15mg po qhs for insomnia    #Elevated liver tests  Improving; Continue to trend     # Acute Systolic Heart Failure   Continue Metoprolol succinate 25mg qd     # Atrial Flutter  s/p DCCV 12/30. EP recommending uninterrupted AC x 30 days ; * 11AM January 29th would be 30 days post DCCV.   Continue enoxaparin 60mg SQ q12  Continue amiodarone 100mg qd and metoprolol succinate 25 qd     # Pulmonary embolism (subsegmental RUL)   After completion of AC for DCCV, would need to continue AC for PE  Continue lovenox    # Bowel ischemia - s/p SMA embolectomy; Small bowel resection 80 cm + 40 cm; ileostomy in place; management per primary team   Monitor ileostomy output on loperamide 4mg TID, diphenoxylate atropine    # Severe protein-calorie malnutrition   Continue PPN through peripheral IV     # Sacral wound  continue off loading, dressings    #GI /DVT PPX  -Continue pantoprazole 40mg PO BID   -Continue lovenox     #Hyponatremia   Sodium, Serum: 134 mmol/L (01-19-23 @ 09:03)  Sodium, Serum: 133 mmol/L (01-18-23 @ 05:30)  Likely mild hypovolemic hyponatremia ; continue PPN, encourage PO intake. Cautious with any exogenous fluids given low EF  f/u repeat serum Na.     DVT ppx --- already on lovenox at full AC dose.     Rest per primary team.

## 2023-01-20 NOTE — PROVIDER CONTACT NOTE (OTHER) - ASSESSMENT
Pt. verbalizes frustration about current hospitalization. Pt. states they want to end their life, but is unable to d/t current limitations. Pt. describe no plan during communication.

## 2023-01-20 NOTE — PROGRESS NOTE ADULT - ASSESSMENT
77y y/o Male with significant PMHx of IVDU found unresponsive at his nursing home, resolved after Narcan in the field, and brought to [facility]. Found to have PE, atrial flutter, and large LV thrombus on echo. Transferred to [facility] for further management. Pt C/o abdominal pain on 12/10 CTA showing mid SMA with embolus. Abnormal distal small bowel loops and cecum with dilatation and pneumatosis suggesting infarcted bowel. One or two tiny foci of  intrahepatic portal vein pneumatosis. Segmental infarction upper pole left kidney. Now s/p Ex lap, SMA embolectomy, 80cm SBR, abthera vac left in discontinuity (12/11) and transferred to SICU intubated. S/p second look (12/13) and most recently s/p OR for 3rd look, end-to-end anastomosis of remaining bowel, loop ileostomy and abdomen closure (12/15).  LLE Ischemia has not yet fully demarcated, it is clear that the posterior calf (which is needed to heal a BKA flap) is involved and therefore only surgical option available to the patient is an AKA pending medical optimization. LLE gangrene dry at this time.     Recommendations:    - L LE unchanged from prior examinations, dry gangrene extending from toes to just below the knee, no wet gangrene observed; even though it can't be ruled out without proper imaging, low suspicion for osteomyelitis  - If clinically feasible from primary team perspective, patient okay to be discharged, no vascular surgery intervention precluding discharge  - Patient can represent at another time when nutritional status is optimized for elective amputation  - Continue daily local wound care with betadine to all skin below the line of demarcation of his left shin and kerlix. No offloading boot, keep left heel elevated off bed.   - Continue supportive measures  - Rest of care per primary team   - Vascular surgery Team 3C will continue to follow. Please call [phone] with any questions or concerns

## 2023-01-20 NOTE — PROGRESS NOTE ADULT - SUBJECTIVE AND OBJECTIVE BOX
SUBJECTIVE: Patient seen and examined bedside; asking for food, c/o back pain, otherwise tolerated well dressing change.    aMIOdarone    Tablet 100 milliGRAM(s) Oral every 24 hours  enoxaparin Injectable 60 milliGRAM(s) SubCutaneous every 12 hours  metoprolol succinate ER 25 milliGRAM(s) Oral daily      Vital Signs Last 24 Hrs  T(C): 38.8 (20 Jan 2023 05:00), Max: 38.9 (19 Jan 2023 22:00)  T(F): 101.8 (20 Jan 2023 05:00), Max: 102.1 (19 Jan 2023 22:00)  HR: 104 (20 Jan 2023 04:22) (80 - 110)  BP: 163/74 (20 Jan 2023 04:22) (118/65 - 163/74)  BP(mean): 106 (20 Jan 2023 04:22) (85 - 106)  RR: 18 (20 Jan 2023 04:22) (18 - 18)  SpO2: 98% (20 Jan 2023 04:22) (98% - 99%)    Parameters below as of 20 Jan 2023 04:22  Patient On (Oxygen Delivery Method): room air      I&O's Detail    19 Jan 2023 07:01  -  20 Jan 2023 07:00  --------------------------------------------------------  IN:    Fat Emulsion (Fish Oil &amp; Plant Based) 20% Infusion: 8.3 mL    Fat Emulsion (Fish Oil &amp; Plant Based) 20% Infusion: 74.7 mL    Lactated Ringers: 450 mL    PPN (Peripheral Parenteral Nutrition): 1347 mL  Total IN: 1880 mL    OUT:    Ileostomy (mL): 875 mL    Voided (mL): 950 mL  Total OUT: 1825 mL    Total NET: 55 mL      PE:    General: NAD, resting comfortably in bed  C/V: S1 s2, RRR  Pulm: Nonlabored breathing, no respiratory distress  Abd: Soft, NTND  Extrem: L LE dry gangrene from toes to just below the knee, unchanged from prior exam, no obvious signs of wet gangrene        LABS:                        10.0   11.16 )-----------( 317      ( 19 Jan 2023 09:03 )             31.3     01-19    134<L>  |  100  |  12  ----------------------------<  139<H>  4.1   |  27  |  0.59    Ca    8.6      19 Jan 2023 09:03  Phos  3.4     01-19  Mg     1.8     01-19    TPro  6.9  /  Alb  2.5<L>  /  TBili  0.7  /  DBili  x   /  AST  31  /  ALT  56<H>  /  AlkPhos  175<H>  01-19          RADIOLOGY & ADDITIONAL STUDIES:

## 2023-01-20 NOTE — BH CONSULTATION LIAISON PROGRESS NOTE - NSBHFUPINTERVALHXFT_PSY_A_CORE
History reviewed. Pt with 102 fever overnight. Voiced thoughts of suicide to staff in setting of frustration about hospitalization, no voiced intent or attempts to harm self observed. Remeron dose not increased    On interview this afternoon, pt found asleep but arousable, calm, agreeable to brief interview. Reports that he feels very depressed about his health but would never end his life. States that he sometimes says things that are "ruminations" but never has experienced suicidal intent or plan. Denies current SI. States that his sleep continues to be poor and "fragmented" which he attributes to back pain. Agreeable to continue meeting with psychology staff, though too tired on attempted evaluation this morning. No other c/o voiced.

## 2023-01-20 NOTE — BH CONSULTATION LIAISON PROGRESS NOTE - NSBHASSESSMENTFT_PSY_ALL_CORE
77 year old male with history of substance use and no significant past medical history (poor medical follow up, last PCP visit 20 years prior to admission), presenting with unresponsiveness from nursing home to [facility], found to be in Aflutter w RVR with subsegmental PE RUL, transferred to [facility], found to have LV thrombus and worsening HF, EF 10-15%. 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 Course further complicated by LLE pulselessness and ulcerations that will ultimately require BKA. Psychiatry consulted due to depressed mood and suicidality.     The patient presents with ongoing depression, no subjective improvement with initiation of mirtazapine last week though tolerating well. Pt continues to endorse hopelessness, frustration, and intermittent passive SI exacerbated by periods of heightened physical distress, no SI present on exam today, never any intent or plan. Continues with low energy, difficulty initiating sleep at night though observed with daytime sleep. No evidence of tyler, internal response to stimuli, or other psychotic phenomena. At the present time the patient does not appear to be at imminent risk of harm to self. No aggressive ideation/behavior noted or reported.     :::Recommendations:::  - No need for 1:1 observation for suicidality. Consider supervised room per nursing protocol.  - Recommend INCREASING mirtazapine to 30mg QHS for ongoing depressive sx and subjective nighttime insomnia  -Encourage sleep hygiene and limiting sleep during the day  - When medically possible, group medical visits/interventions together such that the patient has extended periods of uninterrupted sleep. Impaired sleep has a direct relationship with the development of irritability and mood disorders.   -pain management as per primary team  - The patient greatly enjoys playing chess; a chess board is available through [facility] [employer] ([address] [employer]); the patient is aware that he can request the board be brought.  -Psychology will follow up for individual psychotherapy  -Contact CL with any questions/concerns  - No psychiatric contraindications to discharge

## 2023-01-20 NOTE — PROGRESS NOTE ADULT - SUBJECTIVE AND OBJECTIVE BOX
OVERNIGHT EVENTS: T 101.8    SUBJECTIVE / INTERVAL HPI: Patient seen and examined at bedside. Pt not speaking much or engaging in interview. Denies chest pain, SOB, fevers, chills, abd pain.    12 point ROS negative other than stated above.     VITAL SIGNS:  Vital Signs Last 24 Hrs  T(C): 37.6 (20 Jan 2023 09:28), Max: 38.9 (19 Jan 2023 22:00)  T(F): 99.6 (20 Jan 2023 09:28), Max: 102.1 (19 Jan 2023 22:00)  HR: 104 (20 Jan 2023 08:10) (80 - 110)  BP: 141/80 (20 Jan 2023 08:10) (118/65 - 163/74)  BP(mean): 102 (20 Jan 2023 08:10) (85 - 106)  RR: 18 (20 Jan 2023 08:10) (18 - 18)  SpO2: 98% (20 Jan 2023 08:10) (98% - 99%)    Parameters below as of 20 Jan 2023 08:10  Patient On (Oxygen Delivery Method): room air        PHYSICAL EXAM:    General: NAD; withdrawn  HEENT: NC/AT, anicteric sclera; MMM  Neck: supple  Cardiovascular: +S1/S2; RRR  Respiratory: CTA B/L; no W/R/R  Gastrointestinal: soft, NT/ND; +BSx4  Extremities: LLE dry gangrene  Vascular: 2+ radial, DP/PT pulses B/L  Neurological: AAOx3    MEDICATIONS:  MEDICATIONS  (STANDING):  aMIOdarone    Tablet 100 milliGRAM(s) Oral every 24 hours  ascorbic acid 500 milliGRAM(s) Oral daily  chlorhexidine 2% Cloths 1 Application(s) Topical <User Schedule>  chlorhexidine 2% Cloths 1 Application(s) Topical <User Schedule>  dextrose 5%. 1000 milliLiter(s) (50 mL/Hr) IV Continuous <Continuous>  dextrose 5%. 1000 milliLiter(s) (100 mL/Hr) IV Continuous <Continuous>  diphenoxylate/atropine 2 Tablet(s) Oral every 8 hours  enoxaparin Injectable 60 milliGRAM(s) SubCutaneous every 12 hours  glucagon  Injectable 1 milliGRAM(s) IntraMuscular once  influenza  Vaccine (HIGH DOSE) 0.7 milliLiter(s) IntraMuscular once  insulin lispro (ADMELOG) corrective regimen sliding scale   SubCutaneous every 6 hours  lactated ringers Bolus 300 milliLiter(s) IV Bolus once  lactated ringers. 1000 milliLiter(s) (50 mL/Hr) IV Continuous <Continuous>  loperamide 4 milliGRAM(s) Oral every 8 hours  metoprolol succinate ER 25 milliGRAM(s) Oral daily  mirtazapine 15 milliGRAM(s) Oral at bedtime  multivitamin 1 Tablet(s) Oral daily  pantoprazole    Tablet 40 milliGRAM(s) Oral two times a day  Parenteral Nutrition - Adult 1 Each (83 mL/Hr) TPN Continuous <Continuous>  psyllium Powder 1 Packet(s) Oral every 8 hours  silver sulfADIAZINE 1% Cream 1 Application(s) Topical daily  zinc sulfate 220 milliGRAM(s) Oral daily    MEDICATIONS  (PRN):  acetaminophen     Tablet .. 650 milliGRAM(s) Oral every 6 hours PRN Temp greater or equal to 38C (100.4F), Mild Pain (1 - 3)  dextrose Oral Gel 15 Gram(s) Oral once PRN Blood Glucose LESS THAN 70 milliGRAM(s)/deciliter  melatonin 3 milliGRAM(s) Oral at bedtime PRN Insomnia  ondansetron Injectable 4 milliGRAM(s) IV Push every 6 hours PRN Nausea and/or Vomiting  oxyCODONE    IR 5 milliGRAM(s) Oral every 4 hours PRN Severe Pain (7 - 10)  sodium chloride 0.9% lock flush 10 milliLiter(s) IV Push every 1 hour PRN Pre/post blood products, medications, blood draw, and to maintain line patency      ALLERGIES:  Allergies    No Known Allergies    Intolerances        LABS:                        10.0   11.16 )-----------( 317      ( 19 Jan 2023 09:03 )             31.3     01-19    134<L>  |  100  |  12  ----------------------------<  139<H>  4.1   |  27  |  0.59    Ca    8.6      19 Jan 2023 09:03  Phos  3.4     01-19  Mg     1.8     01-19    TPro  6.9  /  Alb  2.5<L>  /  TBili  0.7  /  DBili  x   /  AST  31  /  ALT  56<H>  /  AlkPhos  175<H>  01-19        CAPILLARY BLOOD GLUCOSE          RADIOLOGY & ADDITIONAL TESTS: Reviewed.    ASSESSMENT:    PLAN:     Recommendations not final until attending attestation present

## 2023-01-20 NOTE — BH CONSULTATION LIAISON PROGRESS NOTE - OTHER
attentive on brief assessment, unable to fully assess concentration grossly normal ruminations, frustrations with health and pain. no voiced delusions or paranoia. No current SI or HI stable posture in bed

## 2023-01-20 NOTE — PROGRESS NOTE ADULT - ASSESSMENT
77M with PMHx of IVDU found unresponsive at his nursing home, resolved after Narcan in the field and brought to [facility]. Found to have PE, atrial flutter, and large LV thrombus on echo. Transferred to [facility] for further management. Pt c/o abdominal pain on 12/10 CTA showing mid SMA with embolus. Abnormal distal small bowel loops and cecum with dilatation and pneumatosis suggesting infarcted bowel. One or two tiny foci of intrahepatic portal vein pneumatosis. Segmental infarction upper pole left kidney. Now s/p Ex lap, SMA embolectomy, 80cm SBR, abthera vac left in discontinuity (12/11) and transferred to SICU intubated. S/p second look (12/13) and most recently s/p OR for 3rd look, end-to-end anastomosis of remaining bowel, loop ileostomy and abdomen closure (12/15). LLE ischemia, AKA delayed by nutritional optimization.    Regular diet + Ensures PRN  Pain/nausea control  A flutter now s/p cardioversion, amio 100 QD, toprolol XL 25 QD  LV thrombus w LLE limb ischemia- Heparin gtt changed to SQL BID  AKA likely outpatient  AM labs   77M with PMHx of IVDU found unresponsive at his nursing home, resolved after Narcan in the field and brought to [facility]. Found to have PE, atrial flutter, and large LV thrombus on echo. Transferred to [facility] for further management. Pt c/o abdominal pain on 12/10 CTA showing mid SMA with embolus. Abnormal distal small bowel loops and cecum with dilatation and pneumatosis suggesting infarcted bowel. One or two tiny foci of intrahepatic portal vein pneumatosis. Segmental infarction upper pole left kidney. Now s/p Ex lap, SMA embolectomy, 80cm SBR, abthera vac left in discontinuity (12/11) and transferred to SICU intubated. S/p second look (12/13) and most recently s/p OR for 3rd look, end-to-end anastomosis of remaining bowel, loop ileostomy and abdomen closure (12/15). LLE ischemia, AKA delayed by nutritional optimization.    Regular diet + Ensures PRN  Calorie count  Pain/nausea control  A flutter now s/p cardioversion, amio 100 QD, toprolol XL 25 QD  LV thrombus w LLE limb ischemia- Heparin gtt changed to SQL BID  AKA likely outpatient  AM labs   Detail Level: Detailed

## 2023-01-20 NOTE — PROGRESS NOTE ADULT - SUBJECTIVE AND OBJECTIVE BOX
Patient is a 77y old  Male who presents with a chief complaint of A flutter (20 Jan 2023 09:02)    INTERVAL EVENTS:  - tolerating PO intake. Had cream of wheat for breakfast   - Febrile this morning.   - Mood poor.     SUBJECTIVE:  Patient was seen and examined at bedside.  Review of systems: +fever; No CP, dyspnea, nausea or vomiting, dysuria. Rest of 12 point Review of systems negative unless otherwise documented elsewhere in note.     Diet, Regular:     Start Time: 19:00  Supplement Feeding Modality:  Oral  Ensure Enlive Cans or Servings Per Day:  1       Frequency:  Three Times a day (01-20-23 @ 06:53) [Active]    MEDICATIONS:  MEDICATIONS  (STANDING):  aMIOdarone    Tablet 100 milliGRAM(s) Oral every 24 hours  ascorbic acid 500 milliGRAM(s) Oral daily  chlorhexidine 2% Cloths 1 Application(s) Topical <User Schedule>  chlorhexidine 2% Cloths 1 Application(s) Topical <User Schedule>  dextrose 5%. 1000 milliLiter(s) (50 mL/Hr) IV Continuous <Continuous>  dextrose 5%. 1000 milliLiter(s) (100 mL/Hr) IV Continuous <Continuous>  diphenoxylate/atropine 2 Tablet(s) Oral every 8 hours  enoxaparin Injectable 60 milliGRAM(s) SubCutaneous every 12 hours  glucagon  Injectable 1 milliGRAM(s) IntraMuscular once  influenza  Vaccine (HIGH DOSE) 0.7 milliLiter(s) IntraMuscular once  insulin lispro (ADMELOG) corrective regimen sliding scale   SubCutaneous every 6 hours  lactated ringers. 1000 milliLiter(s) (50 mL/Hr) IV Continuous <Continuous>  loperamide 4 milliGRAM(s) Oral every 8 hours  metoprolol succinate ER 25 milliGRAM(s) Oral daily  mirtazapine 15 milliGRAM(s) Oral at bedtime  multivitamin 1 Tablet(s) Oral daily  pantoprazole    Tablet 40 milliGRAM(s) Oral two times a day  psyllium Powder 1 Packet(s) Oral every 8 hours  silver sulfADIAZINE 1% Cream 1 Application(s) Topical daily  zinc sulfate 220 milliGRAM(s) Oral daily    MEDICATIONS  (PRN):  acetaminophen     Tablet .. 650 milliGRAM(s) Oral every 6 hours PRN Temp greater or equal to 38C (100.4F), Mild Pain (1 - 3)  dextrose Oral Gel 15 Gram(s) Oral once PRN Blood Glucose LESS THAN 70 milliGRAM(s)/deciliter  melatonin 3 milliGRAM(s) Oral at bedtime PRN Insomnia  ondansetron Injectable 4 milliGRAM(s) IV Push every 6 hours PRN Nausea and/or Vomiting  oxyCODONE    IR 5 milliGRAM(s) Oral every 4 hours PRN Severe Pain (7 - 10)  sodium chloride 0.9% lock flush 10 milliLiter(s) IV Push every 1 hour PRN Pre/post blood products, medications, blood draw, and to maintain line patency    Allergies    No Known Allergies    Intolerances    OBJECTIVE:  Vital Signs Last 24 Hrs  T(C): 36.8 (20 Jan 2023 14:15), Max: 38.9 (19 Jan 2023 22:00)  T(F): 98.2 (20 Jan 2023 14:15), Max: 102.1 (19 Jan 2023 22:00)  HR: 114 (20 Jan 2023 13:00) (80 - 114)  BP: 147/79 (20 Jan 2023 13:00) (122/63 - 163/74)  BP(mean): 99 (20 Jan 2023 13:00) (86 - 106)  RR: 18 (20 Jan 2023 13:00) (18 - 18)  SpO2: 96% (20 Jan 2023 13:00) (96% - 99%)    Parameters below as of 20 Jan 2023 13:00  Patient On (Oxygen Delivery Method): room air      I&O's Summary    19 Jan 2023 07:01  -  20 Jan 2023 07:00  --------------------------------------------------------  IN: 1880 mL / OUT: 1825 mL / NET: 55 mL    20 Jan 2023 07:01  -  20 Jan 2023 15:30  --------------------------------------------------------  IN: 1311.2 mL / OUT: 1200 mL / NET: 111.2 mL        PHYSICAL EXAM:  Gen: Reclining in bed at time of exam, cachectic. Temporal wasting present   HEENT: NCAT, MMM, clear OP  Neck: supple, trachea at midline  CV: borderline tachycardia, +S1/S2  Pulm: adequate respiratory effort, no increase in work of breathing  Abd: soft, ND ; ostomy in place with loose output   Extremities: left leg with dry gangrene in dressing,   Neuro: AOx3, speaking in full sentences  Psych: dysythymic      LABS:                        10.0   11.16 )-----------( 317      ( 19 Jan 2023 09:03 )             31.3     01-19    134<L>  |  100  |  12  ----------------------------<  139<H>  4.1   |  27  |  0.59    Ca    8.6      19 Jan 2023 09:03  Phos  3.4     01-19  Mg     1.8     01-19    TPro  6.9  /  Alb  2.5<L>  /  TBili  0.7  /  DBili  x   /  AST  31  /  ALT  56<H>  /  AlkPhos  175<H>  01-19    LIVER FUNCTIONS - ( 19 Jan 2023 09:03 )  Alb: 2.5 g/dL / Pro: 6.9 g/dL / ALK PHOS: 175 U/L / ALT: 56 U/L / AST: 31 U/L / GGT: x             CAPILLARY BLOOD GLUCOSE            MICRODATA:      RADIOLOGY/OTHER STUDIES:

## 2023-01-20 NOTE — PROGRESS NOTE ADULT - SUBJECTIVE AND OBJECTIVE BOX
SUBJECTIVE:  Patient seen and examined on AM rounds with chief resident. Overnight, patient spiked a fever (TMax 102) and refused further workup (blood cultures) or intervention (meds). This morning, he is reporting an appetite for real food. He denies nausea, vomiting, fever, and chills. Voiding without issue. Having bowel function     MEDICATIONS  (STANDING):  aMIOdarone    Tablet 100 milliGRAM(s) Oral every 24 hours  ascorbic acid 500 milliGRAM(s) Oral daily  chlorhexidine 2% Cloths 1 Application(s) Topical <User Schedule>  chlorhexidine 2% Cloths 1 Application(s) Topical <User Schedule>  dextrose 5%. 1000 milliLiter(s) (50 mL/Hr) IV Continuous <Continuous>  dextrose 5%. 1000 milliLiter(s) (100 mL/Hr) IV Continuous <Continuous>  diphenoxylate/atropine 2 Tablet(s) Oral every 8 hours  enoxaparin Injectable 60 milliGRAM(s) SubCutaneous every 12 hours  fat emulsion (Fish Oil and Plant Based) 20% Infusion 0.3106 Gm/kG/Day (8.33 mL/Hr) IV Continuous <Continuous>  glucagon  Injectable 1 milliGRAM(s) IntraMuscular once  influenza  Vaccine (HIGH DOSE) 0.7 milliLiter(s) IntraMuscular once  insulin lispro (ADMELOG) corrective regimen sliding scale   SubCutaneous every 6 hours  lactated ringers Bolus 300 milliLiter(s) IV Bolus once  lactated ringers. 1000 milliLiter(s) (50 mL/Hr) IV Continuous <Continuous>  loperamide 4 milliGRAM(s) Oral every 8 hours  metoprolol succinate ER 25 milliGRAM(s) Oral daily  mirtazapine 15 milliGRAM(s) Oral at bedtime  multivitamin 1 Tablet(s) Oral daily  pantoprazole    Tablet 40 milliGRAM(s) Oral two times a day  Parenteral Nutrition - Adult 1 Each (83 mL/Hr) TPN Continuous <Continuous>  psyllium Powder 1 Packet(s) Oral every 8 hours  silver sulfADIAZINE 1% Cream 1 Application(s) Topical daily  zinc sulfate 220 milliGRAM(s) Oral daily    MEDICATIONS  (PRN):  acetaminophen     Tablet .. 650 milliGRAM(s) Oral every 6 hours PRN Temp greater or equal to 38C (100.4F), Mild Pain (1 - 3)  dextrose Oral Gel 15 Gram(s) Oral once PRN Blood Glucose LESS THAN 70 milliGRAM(s)/deciliter  melatonin 3 milliGRAM(s) Oral at bedtime PRN Insomnia  ondansetron Injectable 4 milliGRAM(s) IV Push every 6 hours PRN Nausea and/or Vomiting  oxyCODONE    IR 5 milliGRAM(s) Oral every 4 hours PRN Severe Pain (7 - 10)  sodium chloride 0.9% lock flush 10 milliLiter(s) IV Push every 1 hour PRN Pre/post blood products, medications, blood draw, and to maintain line patency      Vital Signs Last 24 Hrs  T(C): 38.8 (20 Jan 2023 05:00), Max: 38.9 (19 Jan 2023 22:00)  T(F): 101.8 (20 Jan 2023 05:00), Max: 102.1 (19 Jan 2023 22:00)  HR: 104 (20 Jan 2023 04:22) (80 - 110)  BP: 163/74 (20 Jan 2023 04:22) (118/65 - 163/74)  BP(mean): 106 (20 Jan 2023 04:22) (85 - 106)  RR: 18 (20 Jan 2023 04:22) (18 - 18)  SpO2: 98% (20 Jan 2023 04:22) (98% - 99%)    Parameters below as of 20 Jan 2023 04:22  Patient On (Oxygen Delivery Method): room air    Physical Exam:  General: NAD, resting comfortably in bed  Pulmonary: Nonlabored breathing, no respiratory distress  Cardiovascular: NSR  Abdominal: soft, NT, ND, ostomy with output   Extremities: WWP, normal strength    I&O's Summary    18 Jan 2023 07:01  -  19 Jan 2023 07:00  --------------------------------------------------------  IN: 4014.7 mL / OUT: 2245 mL / NET: 1769.7 mL    19 Jan 2023 07:01  -  20 Jan 2023 06:58  --------------------------------------------------------  IN: 1705.1 mL / OUT: 1525 mL / NET: 180.1 mL        LABS:                        10.0   11.16 )-----------( 317      ( 19 Jan 2023 09:03 )             31.3     01-19    134<L>  |  100  |  12  ----------------------------<  139<H>  4.1   |  27  |  0.59    Ca    8.6      19 Jan 2023 09:03  Phos  3.4     01-19  Mg     1.8     01-19    TPro  6.9  /  Alb  2.5<L>  /  TBili  0.7  /  DBili  x   /  AST  31  /  ALT  56<H>  /  AlkPhos  175<H>  01-19        CAPILLARY BLOOD GLUCOSE        LIVER FUNCTIONS - ( 19 Jan 2023 09:03 )  Alb: 2.5 g/dL / Pro: 6.9 g/dL / ALK PHOS: 175 U/L / ALT: 56 U/L / AST: 31 U/L / GGT: x             RADIOLOGY & ADDITIONAL STUDIES:

## 2023-01-21 NOTE — PROGRESS NOTE ADULT - SUBJECTIVE AND OBJECTIVE BOX
INTERVAL HPI/OVERNIGHT EVENTS:    STATUS POST:      POST OPERATIVE DAY #:     SUBJECTIVE:      aMIOdarone    Tablet 100 milliGRAM(s) Oral every 24 hours  enoxaparin Injectable 60 milliGRAM(s) SubCutaneous every 12 hours  metoprolol succinate ER 25 milliGRAM(s) Oral daily      Vital Signs Last 24 Hrs  T(C): 37.2 (21 Jan 2023 04:00), Max: 39.1 (20 Jan 2023 18:00)  T(F): 98.9 (21 Jan 2023 04:00), Max: 102.3 (20 Jan 2023 18:00)  HR: 98 (21 Jan 2023 03:43) (98 - 114)  BP: 163/74 (21 Jan 2023 03:43) (123/59 - 163/74)  BP(mean): 103 (21 Jan 2023 03:43) (81 - 103)  RR: 18 (21 Jan 2023 03:43) (18 - 18)  SpO2: 96% (21 Jan 2023 03:43) (95% - 98%)    Parameters below as of 21 Jan 2023 03:43  Patient On (Oxygen Delivery Method): room air      I&O's Detail    19 Jan 2023 07:01  -  20 Jan 2023 07:00  --------------------------------------------------------  IN:    Fat Emulsion (Fish Oil &amp; Plant Based) 20% Infusion: 8.3 mL    Fat Emulsion (Fish Oil &amp; Plant Based) 20% Infusion: 74.7 mL    Lactated Ringers: 450 mL    PPN (Peripheral Parenteral Nutrition): 1347 mL  Total IN: 1880 mL    OUT:    Ileostomy (mL): 875 mL    Voided (mL): 950 mL  Total OUT: 1825 mL    Total NET: 55 mL      20 Jan 2023 07:01  -  21 Jan 2023 06:28  --------------------------------------------------------  IN:    Fat Emulsion (Fish Oil &amp; Plant Based) 20% Infusion: 33.2 mL    Lactated Ringers: 500 mL    Lactated Ringers Bolus: 300 mL    Oral Fluid: 480 mL    PPN (Peripheral Parenteral Nutrition): 747 mL  Total IN: 2060.2 mL    OUT:    Ileostomy (mL): 2025 mL    Voided (mL): 350 mL  Total OUT: 2375 mL    Total NET: -314.8 mL          General: NAD, resting comfortably in bed  C/V: NSR  Pulm: Nonlabored breathing, no respiratory distress  Abd: soft, NT/ND.  Extrem: WWP, no edema, SCDs in place  Drains:  Uriarte:      LABS:                        9.6    12.77 )-----------( 327      ( 21 Jan 2023 06:08 )             29.6     01-19    134<L>  |  100  |  12  ----------------------------<  139<H>  4.1   |  27  |  0.59    Ca    8.6      19 Jan 2023 09:03  Phos  3.4     01-19  Mg     1.8     01-19    TPro  6.9  /  Alb  2.5<L>  /  TBili  0.7  /  DBili  x   /  AST  31  /  ALT  56<H>  /  AlkPhos  175<H>  01-19          RADIOLOGY & ADDITIONAL STUDIES:   INTERVAL HPI/OVERNIGHT EVENTS: fever 6pm, refusing BCx. 500cc bolus for UOP.    SUBJECTIVE: Patient seen and examined at bedside with chief. Denies abdominal pain, able to negrito PO without n/v, denies cp, sob.      aMIOdarone    Tablet 100 milliGRAM(s) Oral every 24 hours  enoxaparin Injectable 60 milliGRAM(s) SubCutaneous every 12 hours  metoprolol succinate ER 25 milliGRAM(s) Oral daily      Vital Signs Last 24 Hrs  T(C): 37.2 (21 Jan 2023 04:00), Max: 39.1 (20 Jan 2023 18:00)  T(F): 98.9 (21 Jan 2023 04:00), Max: 102.3 (20 Jan 2023 18:00)  HR: 98 (21 Jan 2023 03:43) (98 - 114)  BP: 163/74 (21 Jan 2023 03:43) (123/59 - 163/74)  BP(mean): 103 (21 Jan 2023 03:43) (81 - 103)  RR: 18 (21 Jan 2023 03:43) (18 - 18)  SpO2: 96% (21 Jan 2023 03:43) (95% - 98%)    Parameters below as of 21 Jan 2023 03:43  Patient On (Oxygen Delivery Method): room air      I&O's Detail    19 Jan 2023 07:01  -  20 Jan 2023 07:00  --------------------------------------------------------  IN:    Fat Emulsion (Fish Oil &amp; Plant Based) 20% Infusion: 8.3 mL    Fat Emulsion (Fish Oil &amp; Plant Based) 20% Infusion: 74.7 mL    Lactated Ringers: 450 mL    PPN (Peripheral Parenteral Nutrition): 1347 mL  Total IN: 1880 mL    OUT:    Ileostomy (mL): 875 mL    Voided (mL): 950 mL  Total OUT: 1825 mL    Total NET: 55 mL      20 Jan 2023 07:01  -  21 Jan 2023 06:28  --------------------------------------------------------  IN:    Fat Emulsion (Fish Oil &amp; Plant Based) 20% Infusion: 33.2 mL    Lactated Ringers: 500 mL    Lactated Ringers Bolus: 300 mL    Oral Fluid: 480 mL    PPN (Peripheral Parenteral Nutrition): 747 mL  Total IN: 2060.2 mL    OUT:    Ileostomy (mL): 2025 mL    Voided (mL): 350 mL  Total OUT: 2375 mL    Total NET: -314.8 mL          General: NAD, resting comfortably in bed  C/V: NSR  Pulm: Nonlabored breathing, no respiratory distress  Abd: soft, NT/ND. Incisions well-healed. Ostomy ppp with gas and stool in pouch.  Extrem: RLE WWP, LLE with dry gangrene distal to tibial tuberosity.      LABS:                        9.6    12.77 )-----------( 327      ( 21 Jan 2023 06:08 )             29.6     01-19    134<L>  |  100  |  12  ----------------------------<  139<H>  4.1   |  27  |  0.59    Ca    8.6      19 Jan 2023 09:03  Phos  3.4     01-19  Mg     1.8     01-19    TPro  6.9  /  Alb  2.5<L>  /  TBili  0.7  /  DBili  x   /  AST  31  /  ALT  56<H>  /  AlkPhos  175<H>  01-19          RADIOLOGY & ADDITIONAL STUDIES:

## 2023-01-21 NOTE — PROGRESS NOTE ADULT - ASSESSMENT
77M with PMHx of IVDU found unresponsive at his nursing home, resolved after Narcan in the field and brought to [facility]. Found to have PE, atrial flutter, and large LV thrombus on echo. Transferred to [facility] for further management. Pt c/o abdominal pain on 12/10 CTA showing mid SMA with embolus. Abnormal distal small bowel loops and cecum with dilatation and pneumatosis suggesting infarcted bowel. One or two tiny foci of intrahepatic portal vein pneumatosis. Segmental infarction upper pole left kidney. Now s/p Ex lap, SMA embolectomy, 80cm SBR, abthera vac left in discontinuity (12/11) and transferred to SICU intubated. S/p second look (12/13) and most recently s/p OR for 3rd look, end-to-end anastomosis of remaining bowel, loop ileostomy and abdomen closure (12/15). LLE ischemia, AKA delayed by nutritional optimization.    Regular diet  Pain/nausea control  CTAP with PO and IV contrast  A flutter now s/p cardioversion, amio 100 QD, toprolol XL 25 QD  LV thrombus w LLE limb ischemia  SQL/OOBA/IS  AKA likely outpatient  AM labs  Dispo: ROWAN pending auth and placement

## 2023-01-21 NOTE — PROGRESS NOTE ADULT - ASSESSMENT
78 y/o M first presented to [facility] from [facility] due to unresponsiveness (responded to Narcan). At [facility], found to have subsegmental pulmonary embolism in RUL, rapid atrial flutter with severely reduced LVEF with LV thrombus. Transferred to [facility] on 12/7/22 for LORENZO and possible ablation. At [facility], was found to have left leg ischemia (left leg arterial thrombus on LE duplex on 12/8). Was being considered for rhythm control when he developed abdominal pain, CTA (12/10/22) showed embolus in SMA, bowel infarct, requiring ex-lap on 12/11 with SMA embolectomy, 80cm small bowel resection; On 12/13, underwent 2nd look laparotomy, with 80cm small bowel resection, closure with abthera. On 12/15, underwent 3rd look laparotomy, end-to-end anastomosis of remaining bowel, loop ileostomy and abdominal closure. Now extubated, on tele floor. Eventually underwent LORENZO/DCCV on 12/30/22, now in sinus rhythm. Currently being evaluated for possible above knee amputation for LLE ischemia (possibly deferring till next hospitalization, after optimization of nutritional status)      #Fever  slight elevation in WBC ;   Febrile again today >101F ; RVP negative ;   [ ] agree with ID recs to repeat blood cultures , and add on cell diff  If unable to identify localizing signs of infection, then fevers possibly 2/2 resolving hematoma     # Left leg ischemia -  Decision re: timing of Above Knee Amputation per vascular surgery and primary team. Optimizing nutritional status prior to surgery   Per cardiology, needs 30 days of full AC after DCCV (11AM Jan 29th will be 30 days after DCCV)   If patient is still inpatient on Jan 29th, consider reconsulting vascular for amputation.     #Nausea and vomiting ( resolved)     #Suicidal Ideation  Psych f/u appreciated    #Insomnia   Continue Mirtazapine 15mg po qhs for insomnia    #Elevated liver tests  Improving; Continue to trend     # Acute Systolic Heart Failure   Continue Metoprolol succinate 25mg qd     # Atrial Flutter  s/p DCCV 12/30. EP recommending uninterrupted AC x 30 days ; * 11AM January 29th would be 30 days post DCCV.   Continue enoxaparin 60mg SQ q12  Continue amiodarone 100mg qd and metoprolol succinate 25 qd     # Pulmonary embolism (subsegmental RUL)   After completion of AC for DCCV, would need to continue AC for PE  Continue lovenox    # Bowel ischemia - s/p SMA embolectomy; Small bowel resection 80 cm + 40 cm; ileostomy in place; management per primary team   Monitor ileostomy output on loperamide 4mg TID, diphenoxylate atropine    # Severe protein-calorie malnutrition   Continue PPN through peripheral IV     # Sacral wound  continue off loading, dressings    #GI /DVT PPX  -Continue pantoprazole 40mg PO BID   -Continue lovenox     #Hyponatremia   133 today  Likely mild hypovolemic hyponatremia ; continue PPN, encourage PO intake. Cautious with any exogenous fluids given low EF  trend daily Na    DVT ppx --- already on lovenox at full AC dose.     DISPO: pending ROWAN

## 2023-01-21 NOTE — PROGRESS NOTE ADULT - SUBJECTIVE AND OBJECTIVE BOX
Patient is a 77y old  Male who presents with a chief complaint of A flutter (20 Jan 2023 09:02)    INTERVAL EVENTS:  - tolerating PO intake  - Febrile at 1pm to 101.7      SUBJECTIVE:  Patient was seen and examined at bedside.  Review of systems: Patient declined to be interviewed today apart from "things are not going well"    Diet, Regular:     Start Time: 19:00  Supplement Feeding Modality:  Oral  Ensure Enlive Cans or Servings Per Day:  1       Frequency:  Three Times a day (01-20-23 @ 06:53) [Active]    MEDICATIONS:  MEDICATIONS  (STANDING):  aMIOdarone    Tablet 100 milliGRAM(s) Oral every 24 hours  ascorbic acid 500 milliGRAM(s) Oral daily  chlorhexidine 2% Cloths 1 Application(s) Topical <User Schedule>  chlorhexidine 2% Cloths 1 Application(s) Topical <User Schedule>  dextrose 5%. 1000 milliLiter(s) (50 mL/Hr) IV Continuous <Continuous>  dextrose 5%. 1000 milliLiter(s) (100 mL/Hr) IV Continuous <Continuous>  diphenoxylate/atropine 2 Tablet(s) Oral every 8 hours  enoxaparin Injectable 60 milliGRAM(s) SubCutaneous every 12 hours  glucagon  Injectable 1 milliGRAM(s) IntraMuscular once  influenza  Vaccine (HIGH DOSE) 0.7 milliLiter(s) IntraMuscular once  insulin lispro (ADMELOG) corrective regimen sliding scale   SubCutaneous every 6 hours  lactated ringers. 1000 milliLiter(s) (50 mL/Hr) IV Continuous <Continuous>  loperamide 4 milliGRAM(s) Oral every 8 hours  metoprolol succinate ER 25 milliGRAM(s) Oral daily  mirtazapine 15 milliGRAM(s) Oral at bedtime  multivitamin 1 Tablet(s) Oral daily  pantoprazole    Tablet 40 milliGRAM(s) Oral two times a day  psyllium Powder 1 Packet(s) Oral every 8 hours  silver sulfADIAZINE 1% Cream 1 Application(s) Topical daily  zinc sulfate 220 milliGRAM(s) Oral daily    MEDICATIONS  (PRN):  acetaminophen     Tablet .. 650 milliGRAM(s) Oral every 6 hours PRN Temp greater or equal to 38C (100.4F), Mild Pain (1 - 3)  dextrose Oral Gel 15 Gram(s) Oral once PRN Blood Glucose LESS THAN 70 milliGRAM(s)/deciliter  melatonin 3 milliGRAM(s) Oral at bedtime PRN Insomnia  ondansetron Injectable 4 milliGRAM(s) IV Push every 6 hours PRN Nausea and/or Vomiting  oxyCODONE    IR 5 milliGRAM(s) Oral every 4 hours PRN Severe Pain (7 - 10)  sodium chloride 0.9% lock flush 10 milliLiter(s) IV Push every 1 hour PRN Pre/post blood products, medications, blood draw, and to maintain line patency    Allergies    No Known Allergies    Intolerances    OBJECTIVE:  Vital Signs Last 24 Hrs  T(C): 36.8 (20 Jan 2023 14:15), Max: 38.9 (19 Jan 2023 22:00)  T(F): 98.2 (20 Jan 2023 14:15), Max: 102.1 (19 Jan 2023 22:00)  HR: 114 (20 Jan 2023 13:00) (80 - 114)  BP: 147/79 (20 Jan 2023 13:00) (122/63 - 163/74)  BP(mean): 99 (20 Jan 2023 13:00) (86 - 106)  RR: 18 (20 Jan 2023 13:00) (18 - 18)  SpO2: 96% (20 Jan 2023 13:00) (96% - 99%)    Parameters below as of 20 Jan 2023 13:00  Patient On (Oxygen Delivery Method): room air      I&O's Summary    19 Jan 2023 07:01  -  20 Jan 2023 07:00  --------------------------------------------------------  IN: 1880 mL / OUT: 1825 mL / NET: 55 mL    20 Jan 2023 07:01  -  20 Jan 2023 15:30  --------------------------------------------------------  IN: 1311.2 mL / OUT: 1200 mL / NET: 111.2 mL        PHYSICAL EXAM:  Gen: Thin AA male curled up in bed in somewhat contorted position  HEENT: NCAT, MMM, clear OP  Neck: supple, trachea at midline  CV: regular rhythm, tachycardic  Pulm: adequate respiratory effort, no increase in work of breathing  Abd: soft, ND ; ostomy in place  Extremities: left leg with dry gangrene in dressing,   Neuro: AOx3, speaking in full sentences      LABS:                        10.0   11.16 )-----------( 317      ( 19 Jan 2023 09:03 )             31.3     01-19    134<L>  |  100  |  12  ----------------------------<  139<H>  4.1   |  27  |  0.59    Ca    8.6      19 Jan 2023 09:03  Phos  3.4     01-19  Mg     1.8     01-19    TPro  6.9  /  Alb  2.5<L>  /  TBili  0.7  /  DBili  x   /  AST  31  /  ALT  56<H>  /  AlkPhos  175<H>  01-19    LIVER FUNCTIONS - ( 19 Jan 2023 09:03 )  Alb: 2.5 g/dL / Pro: 6.9 g/dL / ALK PHOS: 175 U/L / ALT: 56 U/L / AST: 31 U/L / GGT: x             CAPILLARY BLOOD GLUCOSE            MICRODATA:      RADIOLOGY/OTHER STUDIES:

## 2023-01-22 NOTE — PROGRESS NOTE ADULT - ASSESSMENT
77M with PMHx of IVDU found unresponsive at his nursing home, resolved after Narcan in the field and brought to [facility]. Found to have PE, atrial flutter, and large LV thrombus on echo. Transferred to [facility] for further management. Pt c/o abdominal pain on 12/10 CTA showing mid SMA with embolus. Abnormal distal small bowel loops and cecum with dilatation and pneumatosis suggesting infarcted bowel. One or two tiny foci of intrahepatic portal vein pneumatosis. Segmental infarction upper pole left kidney. Now s/p Ex lap, SMA embolectomy, 80cm SBR, abthera vac left in discontinuity (12/11) and transferred to SICU intubated. S/p second look (12/13) and most recently s/p OR for 3rd look, end-to-end anastomosis of remaining bowel, loop ileostomy and abdomen closure (12/15). LLE ischemia, AKA delayed by nutritional optimization.    Regular diet  Pain/nausea control  CTAP with PO and IV contrast  A flutter now s/p cardioversion, amio 100 QD, toprolol XL 25 QD  LV thrombus w LLE limb ischemia  SQL/OOBA/IS  AKA likely outpatient  AM labs  Dispo: ROWAN pending auth and placement

## 2023-01-22 NOTE — PROGRESS NOTE ADULT - ASSESSMENT
78 y/o M first presented to [facility] from [facility] due to unresponsiveness (responded to Narcan). At [facility], found to have subsegmental pulmonary embolism in RUL, rapid atrial flutter with severely reduced LVEF with LV thrombus. Transferred to [facility] on 12/7/22 for LORENZO and possible ablation. At [facility], was found to have left leg ischemia (left leg arterial thrombus on LE duplex on 12/8). Was being considered for rhythm control when he developed abdominal pain, CTA (12/10/22) showed embolus in SMA, bowel infarct, requiring ex-lap on 12/11 with SMA embolectomy, 80cm small bowel resection; On 12/13, underwent 2nd look laparotomy, with 80cm small bowel resection, closure with abthera. On 12/15, underwent 3rd look laparotomy, end-to-end anastomosis of remaining bowel, loop ileostomy and abdominal closure. Now extubated, on tele floor. Eventually underwent LORENZO/DCCV on 12/30/22, now in sinus rhythm. Currently being evaluated for possible above knee amputation for LLE ischemia (possibly deferring till next hospitalization, after optimization of nutritional status)      #Fever  slight elevation in WBC ;   -fever curve seems to be downtrending although tends to spike later in the day  [ ] agree with ID recs to repeat blood cultures , and add on cell diff  If unable to identify localizing signs of infection, then fevers possibly 2/2 resolving hematoma     # Left leg ischemia -  Decision re: timing of Above Knee Amputation per vascular surgery and primary team. Optimizing nutritional status prior to surgery   Per cardiology, needs 30 days of full AC after DCCV (11AM Jan 29th will be 30 days after DCCV)   If patient is still inpatient on Jan 29th, consider reconsulting vascular for amputation.       #Suicidal Ideation  Psych f/u appreciated      #Insomnia   -per psych increase to mirtazapine 30qhs for insomnia and depression    #Elevated liver tests  Improving; Continue to trend     # Acute Systolic Heart Failure   Continue Metoprolol succinate 25mg qd     # Atrial Flutter  s/p DCCV 12/30. EP recommending uninterrupted AC x 30 days ; * 11AM January 29th would be 30 days post DCCV.   Continue enoxaparin 60mg SQ q12  Continue amiodarone 100mg qd and metoprolol succinate 25 qd     # Pulmonary embolism (subsegmental RUL)   After completion of AC for DCCV, would need to continue AC for PE  Continue lovenox    # Bowel ischemia - s/p SMA embolectomy; Small bowel resection 80 cm + 40 cm; ileostomy in place; management per primary team   Monitor ileostomy output on loperamide 4mg TID, diphenoxylate atropine    # Severe protein-calorie malnutrition   Continue PPN through peripheral IV     # Sacral wound  continue off loading, dressings    #GI /DVT PPX  -Continue pantoprazole 40mg PO BID   -Continue lovenox     #Hyponatremia   labs not done today yet  Likely mild hypovolemic hyponatremia ; continue PPN, encourage PO intake. Cautious with any exogenous fluids given low EF  trend daily Na    DVT ppx --- already on lovenox at full AC dose.     DISPO: pending ROWAN

## 2023-01-22 NOTE — PROGRESS NOTE ADULT - SUBJECTIVE AND OBJECTIVE BOX
Patient is a 77y old  Male who presents with a chief complaint of A flutter (20 Jan 2023 09:02)    INTERVAL EVENTS:  -last true fever 1/21 at 1pm (101.7)  -afebrile last night into this AM      SUBJECTIVE:  Patient was seen and examined at bedside.  Review of systems: Denies CP, SOB, N/V, chills    Diet, Regular:     Start Time: 19:00  Supplement Feeding Modality:  Oral  Ensure Enlive Cans or Servings Per Day:  1       Frequency:  Three Times a day (01-20-23 @ 06:53) [Active]    MEDICATIONS:  MEDICATIONS  (STANDING):  aMIOdarone    Tablet 100 milliGRAM(s) Oral every 24 hours  ascorbic acid 500 milliGRAM(s) Oral daily  chlorhexidine 2% Cloths 1 Application(s) Topical <User Schedule>  chlorhexidine 2% Cloths 1 Application(s) Topical <User Schedule>  dextrose 5%. 1000 milliLiter(s) (50 mL/Hr) IV Continuous <Continuous>  dextrose 5%. 1000 milliLiter(s) (100 mL/Hr) IV Continuous <Continuous>  diphenoxylate/atropine 2 Tablet(s) Oral every 8 hours  enoxaparin Injectable 60 milliGRAM(s) SubCutaneous every 12 hours  glucagon  Injectable 1 milliGRAM(s) IntraMuscular once  influenza  Vaccine (HIGH DOSE) 0.7 milliLiter(s) IntraMuscular once  insulin lispro (ADMELOG) corrective regimen sliding scale   SubCutaneous every 6 hours  lactated ringers. 1000 milliLiter(s) (50 mL/Hr) IV Continuous <Continuous>  loperamide 4 milliGRAM(s) Oral every 8 hours  metoprolol succinate ER 25 milliGRAM(s) Oral daily  mirtazapine 15 milliGRAM(s) Oral at bedtime  multivitamin 1 Tablet(s) Oral daily  pantoprazole    Tablet 40 milliGRAM(s) Oral two times a day  psyllium Powder 1 Packet(s) Oral every 8 hours  silver sulfADIAZINE 1% Cream 1 Application(s) Topical daily  zinc sulfate 220 milliGRAM(s) Oral daily    MEDICATIONS  (PRN):  acetaminophen     Tablet .. 650 milliGRAM(s) Oral every 6 hours PRN Temp greater or equal to 38C (100.4F), Mild Pain (1 - 3)  dextrose Oral Gel 15 Gram(s) Oral once PRN Blood Glucose LESS THAN 70 milliGRAM(s)/deciliter  melatonin 3 milliGRAM(s) Oral at bedtime PRN Insomnia  ondansetron Injectable 4 milliGRAM(s) IV Push every 6 hours PRN Nausea and/or Vomiting  oxyCODONE    IR 5 milliGRAM(s) Oral every 4 hours PRN Severe Pain (7 - 10)  sodium chloride 0.9% lock flush 10 milliLiter(s) IV Push every 1 hour PRN Pre/post blood products, medications, blood draw, and to maintain line patency    Allergies    No Known Allergies    Intolerances    OBJECTIVE:  Vital Signs Last 24 Hrs  T(C): 36.8 (20 Jan 2023 14:15), Max: 38.9 (19 Jan 2023 22:00)  T(F): 98.2 (20 Jan 2023 14:15), Max: 102.1 (19 Jan 2023 22:00)  HR: 114 (20 Jan 2023 13:00) (80 - 114)  BP: 147/79 (20 Jan 2023 13:00) (122/63 - 163/74)  BP(mean): 99 (20 Jan 2023 13:00) (86 - 106)  RR: 18 (20 Jan 2023 13:00) (18 - 18)  SpO2: 96% (20 Jan 2023 13:00) (96% - 99%)    Parameters below as of 20 Jan 2023 13:00  Patient On (Oxygen Delivery Method): room air      I&O's Summary    19 Jan 2023 07:01  -  20 Jan 2023 07:00  --------------------------------------------------------  IN: 1880 mL / OUT: 1825 mL / NET: 55 mL    20 Jan 2023 07:01  -  20 Jan 2023 15:30  --------------------------------------------------------  IN: 1311.2 mL / OUT: 1200 mL / NET: 111.2 mL        PHYSICAL EXAM:  Gen: Thin AA male resting in bed, NAD  HEENT: NCAT, MMM, clear OP  Neck: supple, trachea at midline  CV: regular rhythm, tachycardic  Pulm: adequate respiratory effort, no increase in work of breathing  Abd: soft, ND ; ostomy in place  Extremities: left leg with dry gangrene in dressing,   Neuro: AOx3      LABS:                        10.0   11.16 )-----------( 317      ( 19 Jan 2023 09:03 )             31.3     01-19    134<L>  |  100  |  12  ----------------------------<  139<H>  4.1   |  27  |  0.59    Ca    8.6      19 Jan 2023 09:03  Phos  3.4     01-19  Mg     1.8     01-19    TPro  6.9  /  Alb  2.5<L>  /  TBili  0.7  /  DBili  x   /  AST  31  /  ALT  56<H>  /  AlkPhos  175<H>  01-19    LIVER FUNCTIONS - ( 19 Jan 2023 09:03 )  Alb: 2.5 g/dL / Pro: 6.9 g/dL / ALK PHOS: 175 U/L / ALT: 56 U/L / AST: 31 U/L / GGT: x             CAPILLARY BLOOD GLUCOSE            MICRODATA:      RADIOLOGY/OTHER STUDIES:

## 2023-01-22 NOTE — PROVIDER CONTACT NOTE (OTHER) - ACTION/TREATMENT ORDERED:
Monitor patient, retake blood pressure in 10-20 minutes, notify provider again if patients systolic less then 100, and if patient becomes symptomatic

## 2023-01-22 NOTE — PROGRESS NOTE ADULT - SUBJECTIVE AND OBJECTIVE BOX
SUBJECTIVE: Patient seen and examined bedside by surgery team.     aMIOdarone    Tablet 100 milliGRAM(s) Oral every 24 hours  enoxaparin Injectable 60 milliGRAM(s) SubCutaneous every 12 hours  metoprolol succinate ER 25 milliGRAM(s) Oral daily      Vital Signs Last 24 Hrs  T(C): 37.3 (22 Jan 2023 04:47), Max: 38.7 (21 Jan 2023 13:13)  T(F): 99.2 (22 Jan 2023 04:47), Max: 101.7 (21 Jan 2023 13:13)  HR: 106 (22 Jan 2023 04:09) (96 - 116)  BP: 138/81 (22 Jan 2023 04:09) (115/66 - 162/63)  BP(mean): 103 (22 Jan 2023 04:09) (75 - 103)  RR: 19 (22 Jan 2023 04:09) (16 - 20)  SpO2: 95% (22 Jan 2023 04:09) (95% - 97%)    Parameters below as of 22 Jan 2023 04:09  Patient On (Oxygen Delivery Method): room air      I&O's Detail    20 Jan 2023 07:01  -  21 Jan 2023 07:00  --------------------------------------------------------  IN:    Fat Emulsion (Fish Oil &amp; Plant Based) 20% Infusion: 33.2 mL    Lactated Ringers: 500 mL    Lactated Ringers Bolus: 300 mL    Oral Fluid: 480 mL    PPN (Peripheral Parenteral Nutrition): 747 mL  Total IN: 2060.2 mL    OUT:    Ileostomy (mL): 2025 mL    Voided (mL): 350 mL  Total OUT: 2375 mL    Total NET: -314.8 mL      21 Jan 2023 07:01  -  22 Jan 2023 06:39  --------------------------------------------------------  IN:    IV PiggyBack: 2000 mL    Lactated Ringers: 1150 mL    Oral Fluid: 1040 mL  Total IN: 4190 mL    OUT:    Ileostomy (mL): 2000 mL    Voided (mL): 600 mL  Total OUT: 2600 mL    Total NET: 1590 mL          General: NAD, resting comfortably in bed  C/V: NSR  Pulm: Nonlabored breathing, no respiratory distress  Abd: soft, NT/ND with well-healed incisions. Ostomy ppp with gas and stool in pouch.  Extrem: RLE WWP, LLE with dry gangrene distal to tibial tuberosity.        LABS:                        9.6    12.77 )-----------( 327      ( 21 Jan 2023 06:08 )             29.6     01-21    133<L>  |  98  |  13  ----------------------------<  98  4.4   |  27  |  0.67    Ca    8.4      21 Jan 2023 06:08  Phos  3.6     01-21  Mg     1.9     01-21    TPro  x   /  Alb  2.2<L>  /  TBili  x   /  DBili  x   /  AST  x   /  ALT  x   /  AlkPhos  x   01-21          RADIOLOGY & ADDITIONAL STUDIES:     SUBJECTIVE: Patient seen and examined bedside by surgery team. Patient denies pain, n/v. -F/C/CP/SOB.    aMIOdarone    Tablet 100 milliGRAM(s) Oral every 24 hours  enoxaparin Injectable 60 milliGRAM(s) SubCutaneous every 12 hours  metoprolol succinate ER 25 milliGRAM(s) Oral daily      Vital Signs Last 24 Hrs  T(C): 37.3 (22 Jan 2023 04:47), Max: 38.7 (21 Jan 2023 13:13)  T(F): 99.2 (22 Jan 2023 04:47), Max: 101.7 (21 Jan 2023 13:13)  HR: 106 (22 Jan 2023 04:09) (96 - 116)  BP: 138/81 (22 Jan 2023 04:09) (115/66 - 162/63)  BP(mean): 103 (22 Jan 2023 04:09) (75 - 103)  RR: 19 (22 Jan 2023 04:09) (16 - 20)  SpO2: 95% (22 Jan 2023 04:09) (95% - 97%)    Parameters below as of 22 Jan 2023 04:09  Patient On (Oxygen Delivery Method): room air      I&O's Detail    20 Jan 2023 07:01  -  21 Jan 2023 07:00  --------------------------------------------------------  IN:    Fat Emulsion (Fish Oil &amp; Plant Based) 20% Infusion: 33.2 mL    Lactated Ringers: 500 mL    Lactated Ringers Bolus: 300 mL    Oral Fluid: 480 mL    PPN (Peripheral Parenteral Nutrition): 747 mL  Total IN: 2060.2 mL    OUT:    Ileostomy (mL): 2025 mL    Voided (mL): 350 mL  Total OUT: 2375 mL    Total NET: -314.8 mL      21 Jan 2023 07:01  -  22 Jan 2023 06:39  --------------------------------------------------------  IN:    IV PiggyBack: 2000 mL    Lactated Ringers: 1150 mL    Oral Fluid: 1040 mL  Total IN: 4190 mL    OUT:    Ileostomy (mL): 2000 mL    Voided (mL): 600 mL  Total OUT: 2600 mL    Total NET: 1590 mL          General: NAD, resting comfortably in bed  C/V: NSR  Pulm: Nonlabored breathing, no respiratory distress  Abd: soft, NT/ND with well-healed incisions. Ostomy ppp with gas and stool in pouch.  Extrem: RLE WWP, LLE with dry gangrene distal to tibial tuberosity.        LABS:                        9.6    12.77 )-----------( 327      ( 21 Jan 2023 06:08 )             29.6     01-21    133<L>  |  98  |  13  ----------------------------<  98  4.4   |  27  |  0.67    Ca    8.4      21 Jan 2023 06:08  Phos  3.6     01-21  Mg     1.9     01-21    TPro  x   /  Alb  2.2<L>  /  TBili  x   /  DBili  x   /  AST  x   /  ALT  x   /  AlkPhos  x   01-21          RADIOLOGY & ADDITIONAL STUDIES:

## 2023-01-23 NOTE — PROGRESS NOTE ADULT - SUBJECTIVE AND OBJECTIVE BOX
Subjective: Pt seen and evaluated by vascular team this AM. Pt complaining of LLE pain during dressing change. Pts LLE dressing C/D/I.     ROS:   Denies Headache, blurred vision, Chest Pain, SOB, Abdominal pain, nausea or vomiting     Social   aMIOdarone    Tablet 100  enoxaparin Injectable 60  metoprolol succinate ER 25      Allergies    No Known Allergies    Intolerances        Vital Signs Last 24 Hrs  T(C): 37.8 (23 Jan 2023 04:47), Max: 37.8 (23 Jan 2023 04:47)  T(F): 100 (23 Jan 2023 04:47), Max: 100 (23 Jan 2023 04:47)  HR: 100 (23 Jan 2023 04:20) (98 - 113)  BP: 154/87 (23 Jan 2023 04:20) (72/47 - 154/87)  BP(mean): 111 (23 Jan 2023 04:20) (55 - 111)  RR: 17 (23 Jan 2023 04:20) (16 - 18)  SpO2: 96% (23 Jan 2023 04:20) (95% - 96%)    Parameters below as of 23 Jan 2023 04:20  Patient On (Oxygen Delivery Method): room air      I&O's Summary    22 Jan 2023 07:01  -  23 Jan 2023 07:00  --------------------------------------------------------  IN: 1700 mL / OUT: 4150 mL / NET: -2450 mL        Physical Exam:  General: NAD, resting comfortably in bed  C/V: S1 s2, RRR  Pulm: Nonlabored breathing, no respiratory distress  Abd: Soft, NTND  Extrem: L LE dry gangrene from toes to just below the knee, unchanged from prior exam, no obvious signs of wet gangrene    LABS:                        9.5    13.71 )-----------( 358      ( 22 Jan 2023 12:00 )             29.4     01-22    132<L>  |  98  |  13  ----------------------------<  105<H>  3.9   |  25  |  0.68    Ca    8.5      22 Jan 2023 12:00  Phos  3.9     01-22  Mg     1.9     01-22

## 2023-01-23 NOTE — PROGRESS NOTE ADULT - ASSESSMENT
A/P:   77y y/o Male with significant PMHx of IVDU found unresponsive at his nursing home, resolved after Narcan in the field, and brought to [facility]. Found to have PE, atrial flutter, and large LV thrombus on echo. Transferred to [facility] for further management. Pt C/o abdominal pain on 12/10 CTA showing mid SMA with embolus. Abnormal distal small bowel loops and cecum with dilatation and pneumatosis suggesting infarcted bowel. One or two tiny foci of  intrahepatic portal vein pneumatosis. Segmental infarction upper pole left kidney. Now s/p Ex lap, SMA embolectomy, 80cm SBR, abthera vac left in discontinuity (12/11) and transferred to SICU intubated. S/p second look (12/13) and most recently s/p OR for 3rd look, end-to-end anastomosis of remaining bowel, loop ileostomy and abdomen closure (12/15).  LLE Ischemia has not yet fully demarcated, it is clear that the posterior calf (which is needed to heal a BKA flap) is involved and therefore only surgical option available to the patient is an AKA pending medical optimization. LLE gangrene dry at this time.     Recommendations:    - L LE unchanged from prior examinations, dry gangrene extending from toes to just below the knee, no wet gangrene observed; even though it can't be ruled out without proper imaging, low suspicion for osteomyelitis  - If clinically feasible from primary team perspective, patient okay to be discharged, no vascular surgery intervention precluding discharge  - Patient can represent at another time when nutritional status is optimized for elective amputation  - Continue daily local wound care with betadine to all skin below the line of demarcation of his left shin and kerlix. No offloading boot, keep left heel elevated off bed.   - Continue supportive measures  - Rest of care per primary team   - Vascular surgery Team 3C will continue to follow. Please call [phone] with any questions or concerns

## 2023-01-23 NOTE — CHART NOTE - NSCHARTNOTEFT_GEN_A_CORE
Admitting Diagnosis:   Patient is a 77y old  Male who presents with a chief complaint of A flutter (2023 09:45)    PAST MEDICAL & SURGICAL HISTORY:  PMHx of IVDU     Current Nutrition Order:  Regular diet with Ensure Enlive TID (1050 kcal, 60g protein, 540mL free H2O)     PO Intake: Good (%) [   ]  Fair (50-75%) [   ] Poor (<25%) [ x  ]- <50% est needs. Please see subjective**    GI Issues:   WDL, Ileostomy (3750 ml/24hrs)  No n/v/d/c  No abd distention or discomfort noted    Pain:  No pain noted at this time     Skin Integrity:  Surgical incision, carla score 13  No pain noted   Pressure ulcer; Stg II sacrum spine    Labs:       132<L>  |  98  |  13  ----------------------------<  105<H>  3.9   |  25  |  0.68    Ca    8.5      2023 12:00  Phos  3.9       Mg     1.9         Medications:  MEDICATIONS  (STANDING):  aMIOdarone    Tablet 100 milliGRAM(s) Oral every 24 hours  ascorbic acid 500 milliGRAM(s) Oral daily  chlorhexidine 2% Cloths 1 Application(s) Topical <User Schedule>  chlorhexidine 2% Cloths 1 Application(s) Topical <User Schedule>  dextrose 5%. 1000 milliLiter(s) (50 mL/Hr) IV Continuous <Continuous>  dextrose 5%. 1000 milliLiter(s) (100 mL/Hr) IV Continuous <Continuous>  diphenoxylate/atropine 2 Tablet(s) Oral every 8 hours  enoxaparin Injectable 60 milliGRAM(s) SubCutaneous every 12 hours  glucagon  Injectable 1 milliGRAM(s) IntraMuscular once  influenza  Vaccine (HIGH DOSE) 0.7 milliLiter(s) IntraMuscular once  insulin lispro (ADMELOG) corrective regimen sliding scale   SubCutaneous every 6 hours  lactated ringers. 1000 milliLiter(s) (50 mL/Hr) IV Continuous <Continuous>  loperamide 4 milliGRAM(s) Oral every 8 hours  metoprolol succinate ER 25 milliGRAM(s) Oral daily  mirtazapine 30 milliGRAM(s) Oral at bedtime  multivitamin 1 Tablet(s) Oral daily  opium Tincture 6 milliGRAM(s) Oral every 12 hours  pantoprazole    Tablet 40 milliGRAM(s) Oral two times a day  psyllium Powder 1 Packet(s) Oral every 8 hours  silver sulfADIAZINE 1% Cream 1 Application(s) Topical daily  zinc sulfate 220 milliGRAM(s) Oral daily    MEDICATIONS  (PRN):  acetaminophen     Tablet .. 650 milliGRAM(s) Oral every 6 hours PRN Temp greater or equal to 38C (100.4F), Mild Pain (1 - 3)  dextrose Oral Gel 15 Gram(s) Oral once PRN Blood Glucose LESS THAN 70 milliGRAM(s)/deciliter  melatonin 3 milliGRAM(s) Oral at bedtime PRN Insomnia  ondansetron Injectable 4 milliGRAM(s) IV Push every 6 hours PRN Nausea and/or Vomiting  oxyCODONE    IR 5 milliGRAM(s) Oral every 4 hours PRN Severe Pain (7 - 10)  sodium chloride 0.9% lock flush 10 milliLiter(s) IV Push every 1 hour PRN Pre/post blood products, medications, blood draw, and to maintain line patency    Admitting Anthropometrics:    pounds +-10%  Wt 142 pounds BMI 16.4 %IBW=66%     Weight Change:    141.9 pounds - dosing wt    136 pounds - Bedscale wt     **PCM:  >> Reports having 1-2 meals/day + ONS. Per pt claims to have 2 ensure/day and 2 nutrament/day - unclear how accurate this is. ?If pt meeting ~75% EER consistently.  >> Does report wt loss.  pounds x6 months ago. Thinks he now is 135 pounds which is consistent with bedscale wt obtained during initial visit by  pounds. Suggest wt loss of 49 pounds/26% body wt loss.  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     Estimated energy needs:  Current body wt for EER based on clinician judgment. Adjust for age, malnutrition, EF, S/p OR, Pressure ulcer; fluids per team  25-30kcal/k-1950kcal/day   1.3-1.5gm/k-98gm prot/day   **Rec upper end of needs     Subjective:  77M with PMHx of IVDU found unresponsive at his nursing home, resolved after Narcan in the field and brought to [facility]. Found to have PE, atrial flutter, and large LV thrombus on echo. Transferred to [facility] for further management. Pt c/o abdominal pain on 12/10 CTA showing mid SMA with embolus. Abnormal distal small bowel loops and cecum with dilatation and pneumatosis suggesting infarcted bowel. One or two tiny foci of intrahepatic portal vein pneumatosis. Segmental infarction upper pole left kidney. Now s/p Ex lap, SMA embolectomy, 80cm SBR, abthera vac left in discontinuity () and transferred to SICU intubated. S/p second look () and most recently s/p OR for 3rd look, end-to-end anastomosis of remaining bowel, loop ileostomy and abdomen closure (12/15). Transferred to CCU on  for cardiac optimization and now transferred back to SICU  for bleeding hemodynamic instability. Echo  shows EF 10-15% with overall hypokinesis. CTA performed demonstrating large hematoma in R abdomen, new hemoperitoneum, and bilateral pleural effusions. Remains in SICU, now extubated with still with limb ischemia to LLE pending amputation and AC held given BRBPR and hematoma; noted pt agreeable for AKA when feasible - AKA currently Pending Cards. Pt s/p DCCV on  (negative LORENZO on ) with successful conversion to NSR. Planning for AKA with vascular surgery once nutrition status is optimize. Team placed PICC 1/10 and initiated supplemental TPN now weaned off with constant encouragement of PO intake. Per CM note. medically ready for discharge with acceptance to [facility].     On assessment, pt resting in bed. Currently pt is on regular diet with Ensure Enlive TID (1050 kcal, 60g protein, 540mL free H2O). Overall appetite remains very poor meeting <25% est needs. Pt noted multiple complaints during assessment about how food was not to his likely- this included sandwiches having too much meat and/or too little meat, not enough sugar packets on his trays, etc. Disc with pt that he is on room service assist and someone comes to take his order- informed pt that he can ask for additional sugar packets, or remove additional meat from sandwiches if it is too much. Family has brought in pt favorite foods to eat that RN helps prepare with family- appears pt overall food choice is limited. Cont to have family provide pt with his own food preferences- dislikes most foods on menu provided. Disc with RN who is on board with plan to cont with encouragement during meals. RD to follow.     Prior PES: Malnutrition Severe in Chronic state RT presumed intake<EER AEB NFPE, wt loss, PO intake  >>on going  Goal: Pt will meet at least 75% of nutrient needs via feasible route / Show no further s/s of malnutrition    Recommendations:  1. Cont with regular diet with Ensure Enlive TID (1050 kcal, 60g protein, 540mL free H2O)  2. Recommend addition of MVI daily  3. Monitor Skin, Wts daily, GOC. Pain/GI per team.   >>Cont with imodium and metamucil per team  4. Labs: monitor BMP, CBC, glucose, lytes - Replete PRN, trend renal indices, LFts, POCT.  5. RD to remain available for additional Recs.    **Disc with team    Education:  Cont to encourage adequate PO intake to prevent further s/sx of malnutrition/ healthy weight gain    Risk Level: High [X   ] Moderate [   ] Low [   ]. Admitting Diagnosis:   Patient is a 77y old  Male who presents with a chief complaint of A flutter (2023 09:45)    PAST MEDICAL & SURGICAL HISTORY:  PMHx of IVDU     Current Nutrition Order:  Regular diet with Ensure Enlive TID (1050 kcal, 60g protein, 540mL free H2O)     PO Intake: Good (%) [   ]  Fair (50-75%) [   ] Poor (<25%) [ x  ]- <50% est needs. Please see subjective**    GI Issues:   WDL, Ileostomy (3750 ml/24hrs)  No n/v/d/c  No abd distention or discomfort noted    Pain:  No pain noted at this time     Skin Integrity:  Surgical incision, carla score 13  No pain noted   Pressure ulcer; Stg II sacrum spine    Labs:       132<L>  |  98  |  13  ----------------------------<  105<H>  3.9   |  25  |  0.68    Ca    8.5      2023 12:00  Phos  3.9       Mg     1.9         Medications:  MEDICATIONS  (STANDING):  aMIOdarone    Tablet 100 milliGRAM(s) Oral every 24 hours  ascorbic acid 500 milliGRAM(s) Oral daily  chlorhexidine 2% Cloths 1 Application(s) Topical <User Schedule>  chlorhexidine 2% Cloths 1 Application(s) Topical <User Schedule>  dextrose 5%. 1000 milliLiter(s) (50 mL/Hr) IV Continuous <Continuous>  dextrose 5%. 1000 milliLiter(s) (100 mL/Hr) IV Continuous <Continuous>  diphenoxylate/atropine 2 Tablet(s) Oral every 8 hours  enoxaparin Injectable 60 milliGRAM(s) SubCutaneous every 12 hours  glucagon  Injectable 1 milliGRAM(s) IntraMuscular once  influenza  Vaccine (HIGH DOSE) 0.7 milliLiter(s) IntraMuscular once  insulin lispro (ADMELOG) corrective regimen sliding scale   SubCutaneous every 6 hours  lactated ringers. 1000 milliLiter(s) (50 mL/Hr) IV Continuous <Continuous>  loperamide 4 milliGRAM(s) Oral every 8 hours  metoprolol succinate ER 25 milliGRAM(s) Oral daily  mirtazapine 30 milliGRAM(s) Oral at bedtime  multivitamin 1 Tablet(s) Oral daily  opium Tincture 6 milliGRAM(s) Oral every 12 hours  pantoprazole    Tablet 40 milliGRAM(s) Oral two times a day  psyllium Powder 1 Packet(s) Oral every 8 hours  silver sulfADIAZINE 1% Cream 1 Application(s) Topical daily  zinc sulfate 220 milliGRAM(s) Oral daily    MEDICATIONS  (PRN):  acetaminophen     Tablet .. 650 milliGRAM(s) Oral every 6 hours PRN Temp greater or equal to 38C (100.4F), Mild Pain (1 - 3)  dextrose Oral Gel 15 Gram(s) Oral once PRN Blood Glucose LESS THAN 70 milliGRAM(s)/deciliter  melatonin 3 milliGRAM(s) Oral at bedtime PRN Insomnia  ondansetron Injectable 4 milliGRAM(s) IV Push every 6 hours PRN Nausea and/or Vomiting  oxyCODONE    IR 5 milliGRAM(s) Oral every 4 hours PRN Severe Pain (7 - 10)  sodium chloride 0.9% lock flush 10 milliLiter(s) IV Push every 1 hour PRN Pre/post blood products, medications, blood draw, and to maintain line patency    Admitting Anthropometrics:    pounds +-10%  Wt 142 pounds BMI 16.4 %IBW=66%     Weight Change:    141.9 pounds - dosing wt    136 pounds - Bedscale wt     **PCM:  >> Reports having 1-2 meals/day + ONS. Per pt claims to have 2 ensure/day and 2 nutrament/day - unclear how accurate this is. ?If pt meeting ~75% EER consistently.  >> Does report wt loss.  pounds x6 months ago. Thinks he now is 135 pounds which is consistent with bedscale wt obtained during initial visit by  pounds. Suggest wt loss of 49 pounds/26% body wt loss.  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     Estimated energy needs:  Current body wt for EER based on clinician judgment. Adjust for age, malnutrition, EF, S/p OR, Pressure ulcer; fluids per team  25-30kcal/k-1950kcal/day   1.3-1.5gm/k-98gm prot/day   **Rec upper end of needs     Subjective:  77M with PMHx of IVDU found unresponsive at his nursing home, resolved after Narcan in the field and brought to [facility]. Found to have PE, atrial flutter, and large LV thrombus on echo. Transferred to [facility] for further management. Pt c/o abdominal pain on 12/10 CTA showing mid SMA with embolus. Abnormal distal small bowel loops and cecum with dilatation and pneumatosis suggesting infarcted bowel. One or two tiny foci of intrahepatic portal vein pneumatosis. Segmental infarction upper pole left kidney. Now s/p Ex lap, SMA embolectomy, 80cm SBR, abthera vac left in discontinuity () and transferred to SICU intubated. S/p second look () and most recently s/p OR for 3rd look, end-to-end anastomosis of remaining bowel, loop ileostomy and abdomen closure (12/15). Transferred to CCU on  for cardiac optimization and now transferred back to SICU  for bleeding hemodynamic instability. Echo  shows EF 10-15% with overall hypokinesis. CTA performed demonstrating large hematoma in R abdomen, new hemoperitoneum, and bilateral pleural effusions. Remains in SICU, now extubated with still with limb ischemia to LLE pending amputation and AC held given BRBPR and hematoma; noted pt agreeable for AKA when feasible - AKA currently Pending Cards. Pt s/p DCCV on  (negative LORENZO on ) with successful conversion to NSR. Planning for AKA with vascular surgery once nutrition status is optimize. Team placed PICC 1/10 and initiated supplemental TPN now weaned off with constant encouragement of PO intake. Per CM note. medically ready for discharge with acceptance to [facility].     On assessment, pt resting in bed. Currently pt is on regular diet with Ensure Enlive TID (1050 kcal, 60g protein, 540mL free H2O). Overall appetite remains very poor meeting <25% est needs. Pt noted multiple complaints during assessment about how food was not to his likely- this included sandwiches having too much meat and/or too little meat, not enough sugar packets on his trays, etc. Disc with pt that he is on room service assist and someone comes to take his order- informed pt that he can ask for additional sugar packets, or remove additional meat from sandwiches if it is too much. Family has brought in pt favorite foods to eat that RN helps prepare with family- appears pt overall food choice is limited. Cont to have family provide pt with his own food preferences- dislikes most foods on menu provided. Disc with RN who is on board with plan to cont with encouragement during meals. RD to follow.     **Kcal count finished  with RD to follow up  to assess, but no sheets were available for pickup. Pt consuming <25% of meals.     Prior PES: Malnutrition Severe in Chronic state RT presumed intake<EER AEB NFPE, wt loss, PO intake  >>on going  Goal: Pt will meet at least 75% of nutrient needs via feasible route / Show no further s/s of malnutrition    Recommendations:  1. Cont with regular diet with Ensure Enlive TID (1050 kcal, 60g protein, 540mL free H2O)  2. Recommend addition of MVI daily  3. Monitor Skin, Wts daily, GOC. Pain/GI per team.   >>Cont with imodium and metamucil per team  4. Labs: monitor BMP, CBC, glucose, lytes - Replete PRN, trend renal indices, LFts, POCT.  5. RD to remain available for additional Recs.    **Disc with team    Education:  Cont to encourage adequate PO intake to prevent further s/sx of malnutrition/ healthy weight gain    Risk Level: High [X   ] Moderate [   ] Low [   ].

## 2023-01-23 NOTE — PROGRESS NOTE ADULT - ASSESSMENT
78 y/o M first presented to [facility] from [facility] due to unresponsiveness (responded to Narcan). At [facility], found to have subsegmental pulmonary embolism in RUL, rapid atrial flutter with severely reduced LVEF with LV thrombus. Transferred to [facility] on 12/7/22 for LORENZO and possible ablation. At [facility], was found to have left leg ischemia (left leg arterial thrombus on LE duplex on 12/8). Was being considered for rhythm control when he developed abdominal pain, CTA (12/10/22) showed embolus in SMA, bowel infarct, requiring ex-lap on 12/11 with SMA embolectomy, 80cm small bowel resection; On 12/13, underwent 2nd look laparotomy, with 80cm small bowel resection, closure with abthera. On 12/15, underwent 3rd look laparotomy, end-to-end anastomosis of remaining bowel, loop ileostomy and abdominal closure. Now extubated, on tele floor. Eventually underwent LORENZO/DCCV on 12/30/22, now in sinus rhythm. Currently being evaluated for possible above knee amputation for LLE ischemia (possibly deferring till next hospitalization, after optimization of nutritional status)      #Fever  leucocytosis   -fever curve seems to be downtrending  monitor WBC  Appreciate ID  agree with ID recs to repeat blood cultures if fever     # Left leg ischemia -  Decision re: timing of Above Knee Amputation per vascular surgery and primary team. Optimizing nutritional status prior to surgery   Per cardiology, needs 30 days of full AC after DCCV (11AM Jan 29th will be 30 days after DCCV)   If patient is still inpatient on Jan 29th, consider reconsulting vascular for amputation.    #Suicidal Ideation  #Insomnia   Psych f/u appreciated    #Elevated liver tests  Improving; Continue to trend     # Acute Systolic Heart Failure   Continue Metoprolol succinate 25mg qd     # Atrial Flutter  s/p DCCV 12/30. EP recommending uninterrupted AC x 30 days ; * 11AM January 29th would be 30 days post DCCV.   Continue enoxaparin 60mg SQ q12  Continue amiodarone 100mg qd and metoprolol succinate 25 qd     # Pulmonary embolism (subsegmental RUL)   After completion of AC for DCCV, would need to continue AC for PE  Continue lovenox    # Bowel ischemia - s/p SMA embolectomy; Small bowel resection 80 cm + 40 cm; ileostomy in place; management per primary team   Monitor ileostomy output on loperamide 4mg TID, diphenoxylate atropine    # Severe protein-calorie malnutrition   Continue PPN through peripheral IV   Encourage PO intake     # Sacral wound  continue off loading, dressings  wounds preventions per nursing protocol     #Hyponatremia   labs not done today   mild hypovolemic hyponatremia ; continue PPN, encourage PO intake. Cautious with any exogenous fluids given low EF  trend daily Na    DVT ppx per primary team  Discussed with primary team

## 2023-01-23 NOTE — PROGRESS NOTE ADULT - SUBJECTIVE AND OBJECTIVE BOX
Patient is a 77y old  Male who presents with a chief complaint of A flutter (23 Jan 2023 09:45)    INTERVAL EVENTS:    SUBJECTIVE:  Patient was seen and examined at bedside. Reports feeling at baseline, denies complaints, tolerating diet. Per primary team, patient has been refusing labs.     Review of systems: No fever, chills, dizziness, HA, Changes in vision, CP, dyspnea, nausea or vomiting, dysuria, changes in bowel movements, LE edema. Rest of 12 point Review of systems negative unless otherwise documented elsewhere in note.     Diet, Regular:     Start Time: 19:00  Supplement Feeding Modality:  Oral  Ensure Enlive Cans or Servings Per Day:  1       Frequency:  Three Times a day (01-20-23 @ 06:53) [Active]      MEDICATIONS:  MEDICATIONS  (STANDING):  aMIOdarone    Tablet 100 milliGRAM(s) Oral every 24 hours  ascorbic acid 500 milliGRAM(s) Oral daily  chlorhexidine 2% Cloths 1 Application(s) Topical <User Schedule>  chlorhexidine 2% Cloths 1 Application(s) Topical <User Schedule>  dextrose 5%. 1000 milliLiter(s) (50 mL/Hr) IV Continuous <Continuous>  dextrose 5%. 1000 milliLiter(s) (100 mL/Hr) IV Continuous <Continuous>  diphenoxylate/atropine 2 Tablet(s) Oral every 8 hours  enoxaparin Injectable 60 milliGRAM(s) SubCutaneous every 12 hours  glucagon  Injectable 1 milliGRAM(s) IntraMuscular once  influenza  Vaccine (HIGH DOSE) 0.7 milliLiter(s) IntraMuscular once  insulin lispro (ADMELOG) corrective regimen sliding scale   SubCutaneous every 6 hours  lactated ringers. 1000 milliLiter(s) (50 mL/Hr) IV Continuous <Continuous>  loperamide 4 milliGRAM(s) Oral every 8 hours  metoprolol succinate ER 25 milliGRAM(s) Oral daily  mirtazapine 30 milliGRAM(s) Oral at bedtime  multivitamin 1 Tablet(s) Oral daily  opium Tincture 6 milliGRAM(s) Oral every 12 hours  pantoprazole    Tablet 40 milliGRAM(s) Oral two times a day  psyllium Powder 1 Packet(s) Oral every 8 hours  silver sulfADIAZINE 1% Cream 1 Application(s) Topical daily  zinc sulfate 220 milliGRAM(s) Oral daily    MEDICATIONS  (PRN):  acetaminophen     Tablet .. 650 milliGRAM(s) Oral every 6 hours PRN Temp greater or equal to 38C (100.4F), Mild Pain (1 - 3)  dextrose Oral Gel 15 Gram(s) Oral once PRN Blood Glucose LESS THAN 70 milliGRAM(s)/deciliter  melatonin 3 milliGRAM(s) Oral at bedtime PRN Insomnia  ondansetron Injectable 4 milliGRAM(s) IV Push every 6 hours PRN Nausea and/or Vomiting  oxyCODONE    IR 5 milliGRAM(s) Oral every 4 hours PRN Severe Pain (7 - 10)  sodium chloride 0.9% lock flush 10 milliLiter(s) IV Push every 1 hour PRN Pre/post blood products, medications, blood draw, and to maintain line patency      Allergies    No Known Allergies    Intolerances        OBJECTIVE:  Vital Signs Last 24 Hrs  T(C): 37.8 (23 Jan 2023 04:47), Max: 37.8 (23 Jan 2023 04:47)  T(F): 100 (23 Jan 2023 04:47), Max: 100 (23 Jan 2023 04:47)  HR: 96 (23 Jan 2023 12:50) (96 - 113)  BP: 124/74 (23 Jan 2023 12:50) (118/73 - 154/87)  BP(mean): 92 (23 Jan 2023 12:50) (87 - 111)  RR: 16 (23 Jan 2023 12:50) (16 - 18)  SpO2: 96% (23 Jan 2023 12:50) (96% - 96%)    Parameters below as of 23 Jan 2023 12:50  Patient On (Oxygen Delivery Method): room air      I&O's Summary    22 Jan 2023 07:01  -  23 Jan 2023 07:00  --------------------------------------------------------  IN: 1700 mL / OUT: 4150 mL / NET: -2450 mL    23 Jan 2023 07:01  -  23 Jan 2023 13:44  --------------------------------------------------------  IN: 100 mL / OUT: 500 mL / NET: -400 mL        PHYSICAL EXAM:  General: AOX3, NAD, lying in bed, speaking in full sentences, no labored breathing on RA  HEENT: AT/NC, no facial asymmetry  Lungs: poor inspiration, no crackles  Heart: RRR  Abdomen: soft, non-tender  Extremities:  left leg gangrene     LABS:                        9.5    13.71 )-----------( 358      ( 22 Jan 2023 12:00 )             29.4     01-22    132<L>  |  98  |  13  ----------------------------<  105<H>  3.9   |  25  |  0.68    Ca    8.5      22 Jan 2023 12:00  Phos  3.9     01-22  Mg     1.9     01-22          CAPILLARY BLOOD GLUCOSE            MICRODATA:      RADIOLOGY/OTHER STUDIES:

## 2023-01-23 NOTE — PROGRESS NOTE ADULT - ASSESSMENT
77M with PMHx of IVDU found unresponsive at his nursing home, resolved after Narcan in the field and brought to [facility]. Found to have PE, atrial flutter, and large LV thrombus on echo. Transferred to [facility] for further management. Pt c/o abdominal pain on 12/10 CTA showing mid SMA with embolus. Abnormal distal small bowel loops and cecum with dilatation and pneumatosis suggesting infarcted bowel. One or two tiny foci of intrahepatic portal vein pneumatosis. Segmental infarction upper pole left kidney. Now s/p Ex lap, SMA embolectomy, 80cm SBR, abthera vac left in discontinuity (12/11) and transferred to SICU intubated. S/p second look (12/13) and most recently s/p OR for 3rd look, end-to-end anastomosis of remaining bowel, loop ileostomy and abdomen closure (12/15). LLE ischemia, AKA delayed by nutritional optimization.    Regular diet w/ ensures/IVF  Pain/nausea control  A flutter now s/p cardioversion, amio 100 QD, toprol XL 25 QD  LV thrombus w LLE limb ischemia  SQL/OOBA/IS  AKA likely outpatient  AM labs  Dispo: ROWAN pending auth and placement

## 2023-01-23 NOTE — PROGRESS NOTE ADULT - SUBJECTIVE AND OBJECTIVE BOX
INTERVAL HPI/OVERNIGHT EVENTS: empirically bolused 500 for tachy 01:00, improved.    STATUS POST:    12/11: Ex lap, SMA embolectomy, 80cm SBR, abthera vac  12/13: Second look, SBR  12/15: Ex-lap, no dead gut, DANIEL of discontinuous gut, loop ileostomy; EBL minimal, 1L crystalloid, UOP 150mL     SUBJECTIVE: Pt seen and examined at bedside this am by surgery team. No acute complaints. Denies f/n/v/cp/sob.    MEDICATIONS  (STANDING):  aMIOdarone    Tablet 100 milliGRAM(s) Oral every 24 hours  ascorbic acid 500 milliGRAM(s) Oral daily  chlorhexidine 2% Cloths 1 Application(s) Topical <User Schedule>  chlorhexidine 2% Cloths 1 Application(s) Topical <User Schedule>  dextrose 5%. 1000 milliLiter(s) (100 mL/Hr) IV Continuous <Continuous>  dextrose 5%. 1000 milliLiter(s) (50 mL/Hr) IV Continuous <Continuous>  diphenoxylate/atropine 2 Tablet(s) Oral every 8 hours  enoxaparin Injectable 60 milliGRAM(s) SubCutaneous every 12 hours  glucagon  Injectable 1 milliGRAM(s) IntraMuscular once  influenza  Vaccine (HIGH DOSE) 0.7 milliLiter(s) IntraMuscular once  insulin lispro (ADMELOG) corrective regimen sliding scale   SubCutaneous every 6 hours  lactated ringers. 1000 milliLiter(s) (50 mL/Hr) IV Continuous <Continuous>  loperamide 4 milliGRAM(s) Oral every 8 hours  metoprolol succinate ER 25 milliGRAM(s) Oral daily  mirtazapine 30 milliGRAM(s) Oral at bedtime  multivitamin 1 Tablet(s) Oral daily  opium Tincture 6 milliGRAM(s) Oral every 12 hours  pantoprazole    Tablet 40 milliGRAM(s) Oral two times a day  psyllium Powder 1 Packet(s) Oral every 8 hours  silver sulfADIAZINE 1% Cream 1 Application(s) Topical daily  zinc sulfate 220 milliGRAM(s) Oral daily    MEDICATIONS  (PRN):  acetaminophen     Tablet .. 650 milliGRAM(s) Oral every 6 hours PRN Temp greater or equal to 38C (100.4F), Mild Pain (1 - 3)  dextrose Oral Gel 15 Gram(s) Oral once PRN Blood Glucose LESS THAN 70 milliGRAM(s)/deciliter  melatonin 3 milliGRAM(s) Oral at bedtime PRN Insomnia  ondansetron Injectable 4 milliGRAM(s) IV Push every 6 hours PRN Nausea and/or Vomiting  oxyCODONE    IR 5 milliGRAM(s) Oral every 4 hours PRN Severe Pain (7 - 10)  sodium chloride 0.9% lock flush 10 milliLiter(s) IV Push every 1 hour PRN Pre/post blood products, medications, blood draw, and to maintain line patency      Vital Signs Last 24 Hrs  T(C): 37.8 (23 Jan 2023 04:47), Max: 37.8 (23 Jan 2023 04:47)  T(F): 100 (23 Jan 2023 04:47), Max: 100 (23 Jan 2023 04:47)  HR: 102 (23 Jan 2023 08:32) (98 - 113)  BP: 134/73 (23 Jan 2023 08:32) (118/73 - 154/87)  BP(mean): 97 (23 Jan 2023 08:32) (87 - 111)  RR: 16 (23 Jan 2023 08:32) (16 - 18)  SpO2: 96% (23 Jan 2023 08:32) (96% - 96%)    Parameters below as of 23 Jan 2023 08:32  Patient On (Oxygen Delivery Method): room air    PHYSICAL EXAM:    General: NAD, resting comfortably in bed, A&O x3  C/V: NSR  Pulm: Nonlabored breathing, no respiratory distress  Abd: soft, NT/ND with well-healed incisions. Ostomy w/ stoma pink and viable, gas and stool in bag.  Extrem: RLE WWP, LLE with dry gangrene distal to tibial tuberosity.    I&O's Detail    22 Jan 2023 07:01  -  23 Jan 2023 07:00  --------------------------------------------------------  IN:    Lactated Ringers: 1000 mL    Lactated Ringers Bolus: 500 mL    Oral Fluid: 200 mL  Total IN: 1700 mL    OUT:    Ileostomy (mL): 3750 mL    Voided (mL): 400 mL  Total OUT: 4150 mL    Total NET: -2450 mL          LABS:                        9.5    13.71 )-----------( 358      ( 22 Jan 2023 12:00 )             29.4     01-22    132<L>  |  98  |  13  ----------------------------<  105<H>  3.9   |  25  |  0.68    Ca    8.5      22 Jan 2023 12:00  Phos  3.9     01-22  Mg     1.9     01-22            RADIOLOGY & ADDITIONAL STUDIES:

## 2023-01-24 NOTE — PROGRESS NOTE ADULT - ASSESSMENT
A/P:   77y y/o Male with significant PMHx of IVDU found unresponsive at his nursing home, resolved after Narcan in the field, and brought to [facility]. Found to have PE, atrial flutter, and large LV thrombus on echo. Transferred to [facility] for further management. Pt C/o abdominal pain on 12/10 CTA showing mid SMA with embolus. Abnormal distal small bowel loops and cecum with dilatation and pneumatosis suggesting infarcted bowel. One or two tiny foci of  intrahepatic portal vein pneumatosis. Segmental infarction upper pole left kidney. Now s/p Ex lap, SMA embolectomy, 80cm SBR, abthera vac left in discontinuity (12/11) and transferred to SICU intubated. S/p second look (12/13) and most recently s/p OR for 3rd look, end-to-end anastomosis of remaining bowel, loop ileostomy and abdomen closure (12/15).  LLE Ischemia has not yet fully demarcated, it is clear that the posterior calf (which is needed to heal a BKA flap) is involved and therefore only surgical option available to the patient is an AKA pending medical optimization. LLE gangrene dry at this time.     Recommendations:    - L LE unchanged from prior examinations, dry gangrene extending from toes to just below the knee, no wet gangrene observed; even though it can't be ruled out without proper imaging, low suspicion for osteomyelitis  - If clinically feasible from primary team perspective, patient okay to be discharged, no vascular surgery intervention precluding discharge  - Patient can represent at another time when nutritional status is optimized for elective amputation  - Continue daily local wound care with betadine to all skin below the line of demarcation of his left shin and kerlix. No offloading boot, keep left heel elevated off bed.   - Continue supportive measures  - Rest of care per primary team   - Vascular surgery Team 3C will continue to follow. Please call [phone] with any questions or concerns

## 2023-01-24 NOTE — PROGRESS NOTE ADULT - ASSESSMENT
78 y/o M first presented to [facility] from [facility] due to unresponsiveness (responded to Narcan). At [facility], found to have subsegmental pulmonary embolism in RUL, rapid atrial flutter with severely reduced LVEF with LV thrombus. Transferred to [facility] on 12/7/22 for LORENZO and possible ablation. At [facility], was found to have left leg ischemia (left leg arterial thrombus on LE duplex on 12/8). Was being considered for rhythm control when he developed abdominal pain, CTA (12/10/22) showed embolus in SMA, bowel infarct, requiring ex-lap on 12/11 with SMA embolectomy, 80cm small bowel resection; On 12/13, underwent 2nd look laparotomy, with 80cm small bowel resection, closure with abthera. On 12/15, underwent 3rd look laparotomy, end-to-end anastomosis of remaining bowel, loop ileostomy and abdominal closure. Now extubated, on tele floor. Eventually underwent LORENZO/DCCV on 12/30/22, now in sinus rhythm. Currently being evaluated for possible above knee amputation for LLE ischemia (possibly deferring till next hospitalization, after optimization of nutritional status)      #Fever  leucocytosis   No fever, worsening WBC. If fevers, gets blood cultures, urine cultures.  If patient with fevers would get ID evaluation .     # Left leg ischemia   Decision re: timing of Above Knee Amputation per vascular surgery and primary team. Optimizing nutritional status prior to surgery   Per cardiology, needs 30 days of full AC after DCCV (11AM Jan 29th will be 30 days after DCCV)   If patient is still inpatient on Jan 29th, consider reconsulting vascular for amputation.    #Suicidal Ideation  #Insomnia   Psych f/u appreciated    #Elevated liver tests  Improving; Continue to trend     # Acute Systolic Heart Failure   Continue Metoprolol succinate 25mg qd     # Atrial Flutter  s/p DCCV 12/30. EP recommending uninterrupted AC x 30 days ; * 11AM January 29th would be 30 days post DCCV.   Continue enoxaparin 60mg SQ q12  Continue amiodarone 100mg qd and metoprolol succinate 25 qd     # Pulmonary embolism (subsegmental RUL)   After completion of AC for DCCV, would need to continue AC for PE  Continue lovenox    # Bowel ischemia - s/p SMA embolectomy; Small bowel resection 80 cm + 40 cm; ileostomy in place; management per primary team   Monitor ileostomy output on loperamide 4mg TID, diphenoxylate atropine    # Severe protein-calorie malnutrition   Continue PPN through peripheral IV   Encourage PO intake     # Sacral wound  continue off loading, dressings  wounds preventions per nursing protocol     #Hyponatremia   No CHEM, this morning.  Hypovolemic hyponatremia ; continue PPN, encourage PO intake. Cautious with any exogenous fluids given low EF  Trend daily Na    DVT ppx per primary team  Discussed with primary team

## 2023-01-24 NOTE — PROGRESS NOTE ADULT - ASSESSMENT
77M with PMHx of IVDU found unresponsive at his nursing home, resolved after Narcan in the field and brought to [facility]. Found to have PE, atrial flutter, and large LV thrombus on echo. Transferred to [facility] for further management. Pt c/o abdominal pain on 12/10 CTA showing mid SMA with embolus. Abnormal distal small bowel loops and cecum with dilatation and pneumatosis suggesting infarcted bowel. One or two tiny foci of intrahepatic portal vein pneumatosis. Segmental infarction upper pole left kidney. Now s/p Ex lap, SMA embolectomy, 80cm SBR, abthera vac left in discontinuity (12/11) and transferred to SICU intubated. S/p second look (12/13) and most recently s/p OR for 3rd look, end-to-end anastomosis of remaining bowel, loop ileostomy and abdomen closure (12/15). LLE ischemia, AKA delayed by nutritional optimization.    Regular diet w/ ensures/IVF  Pain/nausea control  A flutter now s/p cardioversion, amio 100 QD, toprol XL 25 QD  LV thrombus w LLE limb ischemia  SQL/OOBA/IS  AKA likely outpatient  AM labs  Dispo: ROWAN pending auth and placement

## 2023-01-24 NOTE — PROGRESS NOTE ADULT - SUBJECTIVE AND OBJECTIVE BOX
Subjective: Pt seen and examined by vascular team this AM. Pt is sleeping peacefully during dressing change this AM. Pts LLE dressing is C/D/I.     ROS:   Denies Headache, blurred vision, Chest Pain, SOB, Abdominal pain, nausea or vomiting     Social   aMIOdarone    Tablet 100  enoxaparin Injectable 60  metoprolol succinate ER 25      Allergies    No Known Allergies    Intolerances        Vital Signs Last 24 Hrs  T(C): 37.6 (24 Jan 2023 04:29), Max: 37.9 (23 Jan 2023 13:44)  T(F): 99.6 (24 Jan 2023 04:29), Max: 100.3 (23 Jan 2023 13:44)  HR: 94 (24 Jan 2023 03:43) (94 - 110)  BP: 158/74 (24 Jan 2023 03:43) (124/74 - 164/74)  BP(mean): 107 (24 Jan 2023 03:43) (92 - 117)  RR: 16 (24 Jan 2023 03:43) (16 - 16)  SpO2: 98% (24 Jan 2023 03:43) (96% - 99%)    Parameters below as of 24 Jan 2023 03:43  Patient On (Oxygen Delivery Method): room air      I&O's Summary    23 Jan 2023 07:01  -  24 Jan 2023 07:00  --------------------------------------------------------  IN: 2150 mL / OUT: 4300 mL / NET: -2150 mL        Physical Exam:  General: NAD, resting comfortably in bed  C/V: S1 s2, RRR  Pulm: Nonlabored breathing, no respiratory distress  Abd: Soft, NTND  Extrem: L LE dry gangrene from toes to just below the knee, unchanged from prior exam, no obvious signs of wet gangrene      LABS:                        9.5    13.71 )-----------( 358      ( 22 Jan 2023 12:00 )             29.4     01-22    132<L>  |  98  |  13  ----------------------------<  105<H>  3.9   |  25  |  0.68    Ca    8.5      22 Jan 2023 12:00  Phos  3.9     01-22  Mg     1.9     01-22

## 2023-01-24 NOTE — PROGRESS NOTE ADULT - SUBJECTIVE AND OBJECTIVE BOX
STATUS POST:  12/11: Ex lap, SMA embolectomy, 80cm SBR, abthera vac  12/13: Second look, SBR  12/15: Ex-lap, no dead gut, DANIEL of discontinuous gut, loop ileostomy; EBL minimal, 1L crystalloid, UOP 150mL      SUBJECTIVE: Patient seen and examined bedside by chief resident. No acute events overnight.     aMIOdarone    Tablet 100 milliGRAM(s) Oral every 24 hours  enoxaparin Injectable 60 milliGRAM(s) SubCutaneous every 12 hours  metoprolol succinate ER 25 milliGRAM(s) Oral daily      Vital Signs Last 24 Hrs  T(C): 37.6 (24 Jan 2023 04:29), Max: 37.9 (23 Jan 2023 13:44)  T(F): 99.6 (24 Jan 2023 04:29), Max: 100.3 (23 Jan 2023 13:44)  HR: 94 (24 Jan 2023 03:43) (94 - 110)  BP: 158/74 (24 Jan 2023 03:43) (124/74 - 164/74)  BP(mean): 107 (24 Jan 2023 03:43) (92 - 117)  RR: 16 (24 Jan 2023 03:43) (16 - 16)  SpO2: 98% (24 Jan 2023 03:43) (96% - 99%)    Parameters below as of 24 Jan 2023 03:43  Patient On (Oxygen Delivery Method): room air      I&O's Detail    23 Jan 2023 07:01  -  24 Jan 2023 07:00  --------------------------------------------------------  IN:    Lactated Ringers: 1150 mL    Lactated Ringers Bolus: 1000 mL  Total IN: 2150 mL    OUT:    Ileostomy (mL): 3550 mL    Voided (mL): 750 mL  Total OUT: 4300 mL    Total NET: -2150 mL          General: NAD, resting comfortably in bed  C/V: NSR  Pulm: Nonlabored breathing, no respiratory distress  Abd: soft, NT/ND. ileostomy with stool in bag  Extrem: WWP, no edema, SCDs in place        LABS:                        9.5    13.71 )-----------( 358      ( 22 Jan 2023 12:00 )             29.4     01-22    132<L>  |  98  |  13  ----------------------------<  105<H>  3.9   |  25  |  0.68    Ca    8.5      22 Jan 2023 12:00  Phos  3.9     01-22  Mg     1.9     01-22            RADIOLOGY & ADDITIONAL STUDIES:

## 2023-01-24 NOTE — PROGRESS NOTE ADULT - SUBJECTIVE AND OBJECTIVE BOX
Patient is a 77y old  Male who presents with a chief complaint of A flutter (24 Jan 2023 08:34)    INTERVAL EVENTS:    SUBJECTIVE:  Patient was seen and examined at bedside. Patient reports feeling at baseline, denies SOB, no chest pain, no report of diarrhea. No other complaints, no events reported.     Review of systems: limited as above     Diet, Regular:     Start Time: 19:00  Supplement Feeding Modality:  Oral  Ensure Enlive Cans or Servings Per Day:  1       Frequency:  Three Times a day (01-20-23 @ 06:53) [Active]      MEDICATIONS:  MEDICATIONS  (STANDING):  aMIOdarone    Tablet 100 milliGRAM(s) Oral every 24 hours  ascorbic acid 500 milliGRAM(s) Oral daily  chlorhexidine 2% Cloths 1 Application(s) Topical <User Schedule>  chlorhexidine 2% Cloths 1 Application(s) Topical <User Schedule>  dextrose 5%. 1000 milliLiter(s) (50 mL/Hr) IV Continuous <Continuous>  dextrose 5%. 1000 milliLiter(s) (100 mL/Hr) IV Continuous <Continuous>  diphenoxylate/atropine 2 Tablet(s) Oral every 8 hours  enoxaparin Injectable 60 milliGRAM(s) SubCutaneous every 12 hours  glucagon  Injectable 1 milliGRAM(s) IntraMuscular once  influenza  Vaccine (HIGH DOSE) 0.7 milliLiter(s) IntraMuscular once  insulin lispro (ADMELOG) corrective regimen sliding scale   SubCutaneous every 6 hours  lactated ringers. 1000 milliLiter(s) (50 mL/Hr) IV Continuous <Continuous>  loperamide 4 milliGRAM(s) Oral every 8 hours  metoprolol succinate ER 25 milliGRAM(s) Oral daily  mirtazapine 30 milliGRAM(s) Oral at bedtime  multivitamin 1 Tablet(s) Oral daily  opium Tincture 6 milliGRAM(s) Oral every 12 hours  pantoprazole    Tablet 40 milliGRAM(s) Oral two times a day  psyllium Powder 1 Packet(s) Oral every 8 hours  silver sulfADIAZINE 1% Cream 1 Application(s) Topical daily  zinc sulfate 220 milliGRAM(s) Oral daily    MEDICATIONS  (PRN):  acetaminophen     Tablet .. 650 milliGRAM(s) Oral every 6 hours PRN Temp greater or equal to 38C (100.4F), Mild Pain (1 - 3)  dextrose Oral Gel 15 Gram(s) Oral once PRN Blood Glucose LESS THAN 70 milliGRAM(s)/deciliter  melatonin 3 milliGRAM(s) Oral at bedtime PRN Insomnia  ondansetron Injectable 4 milliGRAM(s) IV Push every 6 hours PRN Nausea and/or Vomiting  oxyCODONE    IR 5 milliGRAM(s) Oral every 4 hours PRN Severe Pain (7 - 10)  sodium chloride 0.9% lock flush 10 milliLiter(s) IV Push every 1 hour PRN Pre/post blood products, medications, blood draw, and to maintain line patency      Allergies    No Known Allergies    Intolerances        OBJECTIVE:  Vital Signs Last 24 Hrs  T(C): 37.6 (24 Jan 2023 09:39), Max: 37.9 (23 Jan 2023 13:44)  T(F): 99.7 (24 Jan 2023 09:39), Max: 100.3 (23 Jan 2023 13:44)  HR: 104 (24 Jan 2023 08:22) (94 - 110)  BP: 154/79 (24 Jan 2023 08:22) (124/74 - 164/74)  BP(mean): 104 (24 Jan 2023 08:22) (92 - 117)  RR: 16 (24 Jan 2023 08:22) (16 - 16)  SpO2: 99% (24 Jan 2023 08:22) (96% - 99%)    Parameters below as of 24 Jan 2023 08:22  Patient On (Oxygen Delivery Method): room air      I&O's Summary    23 Jan 2023 07:01  -  24 Jan 2023 07:00  --------------------------------------------------------  IN: 2150 mL / OUT: 4300 mL / NET: -2150 mL    24 Jan 2023 07:01  -  24 Jan 2023 10:35  --------------------------------------------------------  IN: 240 mL / OUT: 0 mL / NET: 240 mL        PHYSICAL EXAM:  General: awake, alert, no labored breathing on RA  HEENT: AT/NC, no facial asymmetry   Lungs: poor inspiration, no crackles  Heart:  RRR  Abdomen: soft, non-tender  Extremities:  warm, left leg gangrene    LABS:                        9.5    15.02 )-----------( 421      ( 24 Jan 2023 10:09 )             30.2     01-22    132<L>  |  98  |  13  ----------------------------<  105<H>  3.9   |  25  |  0.68    Ca    8.5      22 Jan 2023 12:00  Phos  3.9     01-22  Mg     1.9     01-22          CAPILLARY BLOOD GLUCOSE            MICRODATA:      RADIOLOGY/OTHER STUDIES:

## 2023-01-25 NOTE — BH CONSULTATION LIAISON PROGRESS NOTE - NSBHATTESTCOMMENTATTENDFT_PSY_A_CORE
77 year old male with history of substance use and no significant past medical history (poor medical follow up, last PCP visit 20 years prior to admission), presenting with unresponsiveness from nursing home to [facility], found to be in Aflutter w RVR with subsegmental PE RUL, transferred to [facility], found to have LV thrombus and worsening HF, EF 10-15%. 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 Course further complicated by LLE pulselessness and ulcerations that will ultimately require BKA. Psychiatry consulted due to depressed mood and suicidality. Patient presents with chronic low mood and hopelessness about his current medical conditions and impact on his independence. He denies any active SI/HI. He is calm, cooperative and adherent with his treatment. Plan:- continue current treatment with mirtazapine 30mg po qhs; -Psychology follow up for supportive psychotherapy; -no need for 1:1 or inpatient psychiatric admission; -CL to continue to follow the pt as needed, please call with questions/concerns.

## 2023-01-25 NOTE — PROGRESS NOTE ADULT - ASSESSMENT
77M first presented to [facility] from [facility] due to unresponsiveness (responded to Narcan). At [facility], found to have subsegmental pulmonary embolism in RUL, rapid atrial flutter with severely reduced LVEF with LV thrombus. Transferred to [facility] on 12/7/22 for LORENZO and possible ablation. At [facility], was found to have left leg ischemia (left leg arterial thrombus on LE duplex on 12/8). Was being considered for rhythm control when he developed abdominal pain, CTA (12/10/22) showed embolus in SMA, bowel infarct, requiring ex-lap on 12/11 with SMA embolectomy, 80cm small bowel resection; On 12/13, underwent 2nd look laparotomy, with 80cm small bowel resection, closure with abthera. On 12/15, underwent 3rd look laparotomy, end-to-end anastomosis of remaining bowel, loop ileostomy and abdominal closure. Now extubated, on tele floor. Eventually underwent LORENZO/DCCV on 12/30/22, now in sinus rhythm. Currently being evaluated for possible above knee amputation for LLE ischemia (possibly deferring till next hospitalization, after optimization of nutritional status)      #Fever  leucocytosis   - leukocytosis is worsening, and started becoming febrile again.  PICC line removed, and per discussion with team, patient had been refusing blood cultures.  Will attempt to get further blood cultures and consult ID today.     # Left leg ischemia   - planning per vascular surgery and primary team  - will need to optimize nutritional status prior to operation  - per cards, will need 30 days of anticoagulation (full dose) - 11AM on 1/29 will be 30 days  - if patient remains in the hospital - ?amputation with vascular     #Suicidal Ideation  #Insomnia   - appreciate input from psychiatry    #Elevated liver tests  Improving; Continue to trend     # Acute Systolic Heart Failure   - Metoprolol succinate 25mg qd     # Atrial Flutter  s/p DCCV 12/30. EP recommending uninterrupted AC x 30 days ; * 11AM January 29th would be 30 days post DCCV.   - on lovenox, amiodarone and metoprolol succinate    # Pulmonary embolism (subsegmental RUL)   After completion of AC for DCCV, would need to continue AC for PE - continuing lovenox at this time    # Bowel ischemia - s/p SMA embolectomy; Small bowel resection  - ileostomy  - plan per primary team  - continue loperamide and diphenoxylate atropine    # Severe protein-calorie malnutrition   - needs nutritional optimization.  PICC line removed - still continuing febrile workup    # Sacral wound  - nursing wound care per nursing protocol     #Hyponatremia   - improved.  Difficult to give IV fluids, particularly with HFrEF  - daily BMP  - likely hypovolumic hyponatremia, due to poor/lack of PO intake and loose stools from ostomy    DVT ppx per primary team  Discussed with primary team     >55 minutes spent on this encounter, including face to face with patient, care coordination and documentation.

## 2023-01-25 NOTE — PROGRESS NOTE ADULT - SUBJECTIVE AND OBJECTIVE BOX
Infectious Diseases Progress Note:    SUBJECTIVE: Patient seen and examined at bedside. Patient denies fever, chills, HA, nausea, vomiting, abdominal pain, dysuria, diarrhea.    AFIB      Pulmonary thromboembolism    Atrial flutter    Acute systolic congestive heart failure    Abdominal pain    Encounter for palliative care    Therapeutic opioid induced constipation    Difficulty coping with disease    Heart failure    Advance care planning    Debility    Acute mesenteric ischemia    Septic shock    Limb ischemia    Poor appetite    Hemorrhagic shock    Acute systolic congestive heart failure    Atrial flutter    Mesenteric ischemia    GI bleed    Lower limb ischemia    Severe protein-calorie malnutrition        Allergies    No Known Allergies    Intolerances        ANTIBIOTICS/RELEVANT:  antimicrobials    immunologic:  influenza  Vaccine (HIGH DOSE) 0.7 milliLiter(s) IntraMuscular once      OTHER:  acetaminophen     Tablet .. 650 milliGRAM(s) Oral every 6 hours PRN  aMIOdarone    Tablet 100 milliGRAM(s) Oral every 24 hours  ascorbic acid 500 milliGRAM(s) Oral daily  chlorhexidine 2% Cloths 1 Application(s) Topical <User Schedule>  chlorhexidine 2% Cloths 1 Application(s) Topical <User Schedule>  dextrose 5%. 1000 milliLiter(s) IV Continuous <Continuous>  dextrose 5%. 1000 milliLiter(s) IV Continuous <Continuous>  dextrose Oral Gel 15 Gram(s) Oral once PRN  diphenoxylate/atropine 2 Tablet(s) Oral every 8 hours  enoxaparin Injectable 60 milliGRAM(s) SubCutaneous every 12 hours  fat emulsion (Fish Oil and Plant Based) 20% Infusion 0.31 Gm/kG/Day IV Continuous <Continuous>  glucagon  Injectable 1 milliGRAM(s) IntraMuscular once  insulin lispro (ADMELOG) corrective regimen sliding scale   SubCutaneous every 6 hours  lactated ringers. 1000 milliLiter(s) IV Continuous <Continuous>  loperamide 4 milliGRAM(s) Oral every 8 hours  melatonin 3 milliGRAM(s) Oral at bedtime PRN  metoprolol succinate ER 25 milliGRAM(s) Oral daily  mirtazapine 30 milliGRAM(s) Oral at bedtime  multivitamin 1 Tablet(s) Oral daily  ondansetron Injectable 4 milliGRAM(s) IV Push every 6 hours PRN  opium Tincture 6 milliGRAM(s) Oral every 12 hours  oxyCODONE    IR 5 milliGRAM(s) Oral every 6 hours PRN  pantoprazole    Tablet 40 milliGRAM(s) Oral two times a day  Parenteral Nutrition - Adult 1 Each TPN Continuous <Continuous>  psyllium Powder 1 Packet(s) Oral every 8 hours  sacubitril 24 mG/valsartan 26 mG 1 Tablet(s) Oral two times a day  silver sulfADIAZINE 1% Cream 1 Application(s) Topical daily  sodium chloride 0.9% lock flush 10 milliLiter(s) IV Push every 1 hour PRN  spironolactone 25 milliGRAM(s) Oral daily  zinc sulfate 220 milliGRAM(s) Oral daily      Objective:  Vital Signs Last 24 Hrs  T(C): 38 (25 Jan 2023 14:15), Max: 38.2 (24 Jan 2023 21:30)  T(F): 100.4 (25 Jan 2023 14:15), Max: 100.8 (24 Jan 2023 21:30)  HR: 108 (25 Jan 2023 11:29) (94 - 110)  BP: 127/79 (25 Jan 2023 11:29) (115/62 - 134/67)  BP(mean): 96 (25 Jan 2023 11:29) (76 - 96)  RR: 18 (25 Jan 2023 11:28) (16 - 18)  SpO2: 98% (25 Jan 2023 11:28) (98% - 99%)    Parameters below as of 25 Jan 2023 11:28  Patient On (Oxygen Delivery Method): room air        PHYSICAL EXAM:  Constitutional: Well-developed, well nourished  Eyes: PERRLA, EOMI  Ear/Nose/Throat: no oral lesion, no sinus tenderness on percussion	  Neck:no JVD, no lymphadenopathy, supple  Respiratory: CTA bilateral  Cardiovascular: S1S2, RRR, no murmurs  Gastrointestinal: soft, NTND, (+) BS, no HSM  Extremities: no c/e/e  Skin: no rashes    LABS:                        9.8    15.88 )-----------( 478      ( 25 Jan 2023 05:30 )             32.7     01-25    136  |  105  |  13  ----------------------------<  119<H>  4.1   |  23  |  0.77    Ca    8.7      25 Jan 2023 05:30  Phos  3.3     01-25  Mg     1.9     01-25            MICROBIOLOGY:            RADIOLOGY & ADDITIONAL STUDIES: INTERVAL HPI/OVERNIGHT EVENTS: Fevers less frequent and less intense.    ROS: UTO      ANTIBIOTICS/RELEVANT:    MEDICATIONS  (STANDING):  aMIOdarone    Tablet 100 milliGRAM(s) Oral every 24 hours  ascorbic acid 500 milliGRAM(s) Oral daily  chlorhexidine 2% Cloths 1 Application(s) Topical <User Schedule>  chlorhexidine 2% Cloths 1 Application(s) Topical <User Schedule>  dextrose 5%. 1000 milliLiter(s) (50 mL/Hr) IV Continuous <Continuous>  dextrose 5%. 1000 milliLiter(s) (100 mL/Hr) IV Continuous <Continuous>  diphenoxylate/atropine 2 Tablet(s) Oral every 8 hours  enoxaparin Injectable 60 milliGRAM(s) SubCutaneous every 12 hours  fat emulsion (Fish Oil and Plant Based) 20% Infusion 0.31 Gm/kG/Day (8.33 mL/Hr) IV Continuous <Continuous>  glucagon  Injectable 1 milliGRAM(s) IntraMuscular once  influenza  Vaccine (HIGH DOSE) 0.7 milliLiter(s) IntraMuscular once  insulin lispro (ADMELOG) corrective regimen sliding scale   SubCutaneous every 6 hours  loperamide 4 milliGRAM(s) Oral every 8 hours  metoprolol succinate ER 25 milliGRAM(s) Oral daily  mirtazapine 30 milliGRAM(s) Oral at bedtime  multivitamin 1 Tablet(s) Oral daily  opium Tincture 6 milliGRAM(s) Oral every 12 hours  pantoprazole    Tablet 40 milliGRAM(s) Oral two times a day  Parenteral Nutrition - Adult 1 Each (63 mL/Hr) TPN Continuous <Continuous>  psyllium Powder 1 Packet(s) Oral every 8 hours  sacubitril 24 mG/valsartan 26 mG 1 Tablet(s) Oral two times a day  silver sulfADIAZINE 1% Cream 1 Application(s) Topical daily  spironolactone 25 milliGRAM(s) Oral daily  zinc sulfate 220 milliGRAM(s) Oral daily    MEDICATIONS  (PRN):  acetaminophen     Tablet .. 650 milliGRAM(s) Oral every 6 hours PRN Temp greater or equal to 38C (100.4F), Mild Pain (1 - 3)  dextrose Oral Gel 15 Gram(s) Oral once PRN Blood Glucose LESS THAN 70 milliGRAM(s)/deciliter  melatonin 3 milliGRAM(s) Oral at bedtime PRN Insomnia  ondansetron Injectable 4 milliGRAM(s) IV Push every 6 hours PRN Nausea and/or Vomiting  oxyCODONE    IR 5 milliGRAM(s) Oral every 6 hours PRN Severe Pain (7 - 10)  sodium chloride 0.9% lock flush 10 milliLiter(s) IV Push every 1 hour PRN Pre/post blood products, medications, blood draw, and to maintain line patency        Vital Signs Last 24 Hrs  T(C): 37.6 (25 Jan 2023 17:46), Max: 38.2 (24 Jan 2023 21:30)  T(F): 99.6 (25 Jan 2023 17:46), Max: 100.8 (24 Jan 2023 21:30)  HR: 108 (25 Jan 2023 11:29) (98 - 110)  BP: 127/79 (25 Jan 2023 11:29) (116/56 - 134/67)  BP(mean): 96 (25 Jan 2023 11:29) (76 - 96)  RR: 18 (25 Jan 2023 11:28) (18 - 18)  SpO2: 98% (25 Jan 2023 11:28) (98% - 99%)    Parameters below as of 25 Jan 2023 11:28  Patient On (Oxygen Delivery Method): room air        PHYSICAL EXAM:  Constitutional: NAD  Eyes: JOHN, EOMI  Ear/Nose/Throat: no oral lesion, no sinus tenderness on percussion	  Neck: no JVD, no lymphadenopathy, supple  Respiratory: CTA keerthi  Cardiovascular: S1S2 RRR, no murmurs  Gastrointestinal:soft, (+) BS, no HSM  Extremities:no e/e/c  Vascular: DP Pulse:	right normal; left normal      LABS:                        9.8    15.88 )-----------( 478      ( 25 Jan 2023 05:30 )             32.7     01-25    136  |  105  |  13  ----------------------------<  119<H>  4.1   |  23  |  0.77    Ca    8.7      25 Jan 2023 05:30  Phos  3.3     01-25  Mg     1.9     01-25            MICROBIOLOGY: blood culture sent 1/25--pending    RADIOLOGY & ADDITIONAL STUDIES: reviewed

## 2023-01-25 NOTE — PROGRESS NOTE ADULT - SUBJECTIVE AND OBJECTIVE BOX
Subjective: Pt seen and evaluated by vascular this AM. Pts LLE dressing is C/D/I. Pt complaining of knee pain during dressing change.     ROS:   Denies Headache, blurred vision, Chest Pain, SOB, Abdominal pain, nausea or vomiting     Social   aMIOdarone    Tablet 100  enoxaparin Injectable 60  metoprolol succinate ER 25      Allergies    No Known Allergies    Intolerances        Vital Signs Last 24 Hrs  T(C): 36.7 (25 Jan 2023 04:43), Max: 38.2 (24 Jan 2023 21:30)  T(F): 98 (25 Jan 2023 04:43), Max: 100.8 (24 Jan 2023 21:30)  HR: 102 (25 Jan 2023 04:50) (94 - 104)  BP: 134/67 (25 Jan 2023 07:05) (115/62 - 161/77)  BP(mean): 85 (25 Jan 2023 07:05) (76 - 111)  RR: 18 (25 Jan 2023 04:50) (16 - 18)  SpO2: 98% (25 Jan 2023 04:50) (98% - 99%)    Parameters below as of 25 Jan 2023 04:50  Patient On (Oxygen Delivery Method): room air      I&O's Summary    24 Jan 2023 07:01  -  25 Jan 2023 07:00  --------------------------------------------------------  IN: 2670 mL / OUT: 3550 mL / NET: -880 mL    25 Jan 2023 07:01  -  25 Jan 2023 08:11  --------------------------------------------------------  IN: 100 mL / OUT: 700 mL / NET: -600 mL        Physical Exam:  General: NAD, resting comfortably in bed  C/V: S1 s2, RRR  Pulm: Nonlabored breathing, no respiratory distress  Abd: Soft, NTND  Extrem: L LE dry gangrene from toes to just below the knee, unchanged from prior exam, no obvious signs of wet gangrene      LABS:                        9.5    15.02 )-----------( 421      ( 24 Jan 2023 10:09 )             30.2     01-24    134<L>  |  103  |  11  ----------------------------<  123<H>  4.1   |  20<L>  |  0.64    Ca    8.6      24 Jan 2023 10:09  Phos  3.0     01-24  Mg     1.8     01-24

## 2023-01-25 NOTE — PROGRESS NOTE ADULT - ASSESSMENT
ID reconsulted for what appear to be improving fevers and gradually increasing leukocytosis. Blood cultures were consistently recommended by me (see note 1/19, 1/20) in this febrile patient with PICC on TPN (cited increased risk of invasive candidiasis); blood culture finally sent by primary team 1/25/23--to f/u gram stain and preliminary results. Fevers previously attributed to intra-abdominal hematoma. No obvious sign/source of infection on exam. CXR reviewed--appears clear although L base not captured/visualized. Pending collection of UA. If WBC continues to increase, would have low threshold to repeat CT AP (?evolving hematoma or other intra-abdominal process). No specific antibiotic recommendations at this time; fevers are not always infectious. Again reiterate palliative care consult given FTT and malnutrition.

## 2023-01-25 NOTE — PROGRESS NOTE ADULT - ASSESSMENT
77y y/o Male with significant PMHx of IVDU found unresponsive at his nursing home, resolved after Narcan in the field, and brought to [facility]. Found to have PE, atrial flutter, and large LV thrombus on echo. Transferred to [facility] for further management. Pt C/o abdominal pain on 12/10 CTA showing mid SMA with embolus. Abnormal distal small bowel loops and cecum with dilatation and pneumatosis suggesting infarcted bowel. One or two tiny foci of  intrahepatic portal vein pneumatosis. Segmental infarction upper pole left kidney. Now s/p Ex lap, SMA embolectomy, 80cm SBR, abthera vac left in discontinuity (12/11) and transferred to SICU intubated. S/p second look (12/13) and most recently s/p OR for 3rd look, end-to-end anastomosis of remaining bowel, loop ileostomy and abdomen closure (12/15).  LLE Ischemia has not yet fully demarcated, it is clear that the posterior calf (which is needed to heal a BKA flap) is involved and therefore only surgical option available to the patient is an AKA pending medical optimization. LLE gangrene dry at this time.     Recommendations:    - L LE unchanged from prior examinations, dry gangrene extending from toes to just below the knee, no wet gangrene observed; even though it can't be ruled out without proper imaging, low suspicion for osteomyelitis  - If clinically feasible from primary team perspective, patient okay to be discharged, no vascular surgery intervention precluding discharge  - Patient can represent at another time when nutritional status is optimized for elective amputation  - Continue daily local wound care with betadine to all skin below the line of demarcation of his left shin and Kerlix. No offloading boot, keep left heel elevated off bed.   - Continue supportive measures  - Rest of care per primary team   - Vascular surgery Team 3C will continue to follow. Please call [phone] with any questions or concerns

## 2023-01-25 NOTE — BH CONSULTATION LIAISON PROGRESS NOTE - NSBHFUPINTERVALHXFT_PSY_A_CORE
The patient was lying in bed awake, alert, and watching television upon approach. The writer met with the patient for further evaluation and individual therapy. The patient did not report any suicidal ideation, plan, intent, or behavior, expressing a desire to continue living. The patient continues to present as depressed, verbalizing significant frustration related to his medical health and hospitalization. No evidence of tyler, paranoia, or psychosis. The writer and patient engaged in discussion about the patient's familial relationships, his hospitalization, and current events. The writer provided emotional support throughout.

## 2023-01-25 NOTE — PROGRESS NOTE ADULT - SUBJECTIVE AND OBJECTIVE BOX
INTERVAL EVENTS:    PAST MEDICAL & SURGICAL HISTORY:      MEDICATIONS  (STANDING):  aMIOdarone    Tablet 100 milliGRAM(s) Oral every 24 hours  ascorbic acid 500 milliGRAM(s) Oral daily  chlorhexidine 2% Cloths 1 Application(s) Topical <User Schedule>  chlorhexidine 2% Cloths 1 Application(s) Topical <User Schedule>  dextrose 5%. 1000 milliLiter(s) (50 mL/Hr) IV Continuous <Continuous>  dextrose 5%. 1000 milliLiter(s) (100 mL/Hr) IV Continuous <Continuous>  diphenoxylate/atropine 2 Tablet(s) Oral every 8 hours  enoxaparin Injectable 60 milliGRAM(s) SubCutaneous every 12 hours  fat emulsion (Fish Oil and Plant Based) 20% Infusion 0.31 Gm/kG/Day (8.32 mL/Hr) IV Continuous <Continuous>  glucagon  Injectable 1 milliGRAM(s) IntraMuscular once  influenza  Vaccine (HIGH DOSE) 0.7 milliLiter(s) IntraMuscular once  insulin lispro (ADMELOG) corrective regimen sliding scale   SubCutaneous every 6 hours  lactated ringers. 1000 milliLiter(s) (50 mL/Hr) IV Continuous <Continuous>  loperamide 4 milliGRAM(s) Oral every 8 hours  metoprolol succinate ER 25 milliGRAM(s) Oral daily  mirtazapine 30 milliGRAM(s) Oral at bedtime  multivitamin 1 Tablet(s) Oral daily  opium Tincture 6 milliGRAM(s) Oral every 12 hours  pantoprazole    Tablet 40 milliGRAM(s) Oral two times a day  Parenteral Nutrition - Adult 1 Each (63 mL/Hr) TPN Continuous <Continuous>  psyllium Powder 1 Packet(s) Oral every 8 hours  silver sulfADIAZINE 1% Cream 1 Application(s) Topical daily  zinc sulfate 220 milliGRAM(s) Oral daily    MEDICATIONS  (PRN):  acetaminophen     Tablet .. 650 milliGRAM(s) Oral every 6 hours PRN Temp greater or equal to 38C (100.4F), Mild Pain (1 - 3)  dextrose Oral Gel 15 Gram(s) Oral once PRN Blood Glucose LESS THAN 70 milliGRAM(s)/deciliter  melatonin 3 milliGRAM(s) Oral at bedtime PRN Insomnia  ondansetron Injectable 4 milliGRAM(s) IV Push every 6 hours PRN Nausea and/or Vomiting  oxyCODONE    IR 5 milliGRAM(s) Oral every 6 hours PRN Severe Pain (7 - 10)  sodium chloride 0.9% lock flush 10 milliLiter(s) IV Push every 1 hour PRN Pre/post blood products, medications, blood draw, and to maintain line patency    Vital Signs Last 24 Hrs  T(C): 37.3 (25 Jan 2023 09:09), Max: 38.2 (24 Jan 2023 21:30)  T(F): 99.2 (25 Jan 2023 09:09), Max: 100.8 (24 Jan 2023 21:30)  HR: 108 (25 Jan 2023 11:29) (94 - 110)  BP: 127/79 (25 Jan 2023 11:29) (115/62 - 161/77)  BP(mean): 96 (25 Jan 2023 11:29) (76 - 111)  RR: 18 (25 Jan 2023 11:28) (16 - 18)  SpO2: 98% (25 Jan 2023 11:28) (98% - 99%)    Parameters below as of 25 Jan 2023 11:28  Patient On (Oxygen Delivery Method): room air        PHYSICAL EXAM:  GEN: Awake, alert. NAD.   HEENT: NCAT, PERRL, EOMI. Mucosa moist. No JVD.  RESP: CTA b/l  CV: RRR. Normal S1/S2. No m/r/g.  ABD: Soft. NT/ND. BS+  EXT: Warm. No edema, clubbing, or cyanosis.   NEURO: AAOx3. No focal deficits.     LABS:                        9.8    15.88 )-----------( 478      ( 25 Jan 2023 05:30 )             32.7     01-25    136  |  105  |  13  ----------------------------<  119<H>  4.1   |  23  |  0.77    Ca    8.7      25 Jan 2023 05:30  Phos  3.3     01-25  Mg     1.9     01-25              I&O's Summary    24 Jan 2023 07:01  -  25 Jan 2023 07:00  --------------------------------------------------------  IN: 2670 mL / OUT: 3550 mL / NET: -880 mL    25 Jan 2023 07:01  -  25 Jan 2023 12:13  --------------------------------------------------------  IN: 300 mL / OUT: 800 mL / NET: -500 mL      RADIOLOGY & ADDITIONAL STUDIES:  TELEMETRY:  EKG:         INTERVAL EVENTS: Denies any chest pain or shortness of breath. Is pending ROWAN placement now as nutrition is not optimized for surgery. Otherwise no new issues     PAST MEDICAL & SURGICAL HISTORY:      MEDICATIONS  (STANDING):  aMIOdarone    Tablet 100 milliGRAM(s) Oral every 24 hours  ascorbic acid 500 milliGRAM(s) Oral daily  chlorhexidine 2% Cloths 1 Application(s) Topical <User Schedule>  chlorhexidine 2% Cloths 1 Application(s) Topical <User Schedule>  dextrose 5%. 1000 milliLiter(s) (50 mL/Hr) IV Continuous <Continuous>  dextrose 5%. 1000 milliLiter(s) (100 mL/Hr) IV Continuous <Continuous>  diphenoxylate/atropine 2 Tablet(s) Oral every 8 hours  enoxaparin Injectable 60 milliGRAM(s) SubCutaneous every 12 hours  fat emulsion (Fish Oil and Plant Based) 20% Infusion 0.31 Gm/kG/Day (8.32 mL/Hr) IV Continuous <Continuous>  glucagon  Injectable 1 milliGRAM(s) IntraMuscular once  influenza  Vaccine (HIGH DOSE) 0.7 milliLiter(s) IntraMuscular once  insulin lispro (ADMELOG) corrective regimen sliding scale   SubCutaneous every 6 hours  lactated ringers. 1000 milliLiter(s) (50 mL/Hr) IV Continuous <Continuous>  loperamide 4 milliGRAM(s) Oral every 8 hours  metoprolol succinate ER 25 milliGRAM(s) Oral daily  mirtazapine 30 milliGRAM(s) Oral at bedtime  multivitamin 1 Tablet(s) Oral daily  opium Tincture 6 milliGRAM(s) Oral every 12 hours  pantoprazole    Tablet 40 milliGRAM(s) Oral two times a day  Parenteral Nutrition - Adult 1 Each (63 mL/Hr) TPN Continuous <Continuous>  psyllium Powder 1 Packet(s) Oral every 8 hours  silver sulfADIAZINE 1% Cream 1 Application(s) Topical daily  zinc sulfate 220 milliGRAM(s) Oral daily    MEDICATIONS  (PRN):  acetaminophen     Tablet .. 650 milliGRAM(s) Oral every 6 hours PRN Temp greater or equal to 38C (100.4F), Mild Pain (1 - 3)  dextrose Oral Gel 15 Gram(s) Oral once PRN Blood Glucose LESS THAN 70 milliGRAM(s)/deciliter  melatonin 3 milliGRAM(s) Oral at bedtime PRN Insomnia  ondansetron Injectable 4 milliGRAM(s) IV Push every 6 hours PRN Nausea and/or Vomiting  oxyCODONE    IR 5 milliGRAM(s) Oral every 6 hours PRN Severe Pain (7 - 10)  sodium chloride 0.9% lock flush 10 milliLiter(s) IV Push every 1 hour PRN Pre/post blood products, medications, blood draw, and to maintain line patency    Vital Signs Last 24 Hrs  T(C): 37.3 (25 Jan 2023 09:09), Max: 38.2 (24 Jan 2023 21:30)  T(F): 99.2 (25 Jan 2023 09:09), Max: 100.8 (24 Jan 2023 21:30)  HR: 108 (25 Jan 2023 11:29) (94 - 110)  BP: 127/79 (25 Jan 2023 11:29) (115/62 - 161/77)  BP(mean): 96 (25 Jan 2023 11:29) (76 - 111)  RR: 18 (25 Jan 2023 11:28) (16 - 18)  SpO2: 98% (25 Jan 2023 11:28) (98% - 99%)    Parameters below as of 25 Jan 2023 11:28  Patient On (Oxygen Delivery Method): room air        PHYSICAL EXAM:  GEN: Awake, alert. NAD.   HEENT: NCAT, PERRL, EOMI. Mucosa moist. No JVD.  RESP: CTA b/l  CV: RRR. Normal S1/S2. No m/r/g.  ABD: Soft. NT/ND. BS+  EXT: Warm. No edema, LLE gangrene    LABS:                        9.8    15.88 )-----------( 478      ( 25 Jan 2023 05:30 )             32.7     01-25    136  |  105  |  13  ----------------------------<  119<H>  4.1   |  23  |  0.77    Ca    8.7      25 Jan 2023 05:30  Phos  3.3     01-25  Mg     1.9     01-25              I&O's Summary    24 Jan 2023 07:01  -  25 Jan 2023 07:00  --------------------------------------------------------  IN: 2670 mL / OUT: 3550 mL / NET: -880 mL    25 Jan 2023 07:01  -  25 Jan 2023 12:13  --------------------------------------------------------  IN: 300 mL / OUT: 800 mL / NET: -500 mL

## 2023-01-25 NOTE — PROGRESS NOTE ADULT - ASSESSMENT
77M Hx  cocaine/opiate abuse and resident of a nursing facility presented from [facility] after unresponsiveness alleviated w/ narcan, noted at that time to be in rapid atrial flutter w/ severe LV dysfunction and mobile LV thrombus as well as subsegemental PE and transferred to [facility] for further management. Course complicated by limb ischemia with arterial thrombosis of LLE vessels and subsequently developed abdominal pain and found to have acute mesenteric ischemia s/p emergent bowel resection and SMA thrombectomy. LV thrombus no longer apparent on TTE and has likely embolized. After prolonged open abdomen, septic shock and now s/p ileostomy. His course has been further complicated by hemodynamically significant GI bleeding. Of note patient, underwent LORENZO/DCCV on 12/30 and remains in sinus rhythm. Patient has been awaiting AKA with attempts to nutritionally optimize by primary team, for wound healing. Now plan is for ROWAN and OP AKA.     Cardiac studies:   TTE: LV 5.0 cm, LVEF 10-15% with global hypokinesis, 2.5 cm mobile LV thrombus, severe RV dysfunction  LORENZO: no ISIAH thrombus    PLAN  Acute Systolic Heart Failure  - Etiology: possible tachycardia induced and now s/p DCCV.. Pending ischemic evaluation as outpatient if LVEF remains reduced pending clinical status  - GDMT: continue metoprolol succinate 25mg qd.  Start Entresto 24/26mg BID and spironolactone 25mg qd. Will defer SGLT2i for now  - Diuretics: Remains euvolemic  - Device: will need to be further evaluated in future.     Atrial flutter  - s/p DCCV 12/30. Remains in sinus  - Continue anticoagulation currently on lovenox   - on amiodarone 100mg per EP, remains on Toprol 25     Acute limb ischemia and mesenteric ischemia from LV thrombus  - management per surgery/vascular, currently awaiting AKA as OP   - continue anticoagulation as above    Heart failure team will sign off. Please call us when patient is ready for discharge to set up HF follow up.

## 2023-01-25 NOTE — BH CONSULTATION LIAISON PROGRESS NOTE - NSBHASSESSMENTFT_PSY_ALL_CORE
77 year old male with history of substance use and no significant past medical history (poor medical follow up, last PCP visit 20 years prior to admission), presenting with unresponsiveness from nursing home to [facility], found to be in Aflutter w RVR with subsegmental PE RUL, transferred to [facility], found to have LV thrombus and worsening HF, EF 10-15%. 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 Course further complicated by LLE pulselessness and ulcerations that will ultimately require BKA. Psychiatry consulted due to depressed mood and suicidality.     The patient continues to present as depressed with some potential feelings of helplessness and hopelessness, appearing more somber and less irritable than in previous sessions. That said, the patient was watching television upon approach (typically he is sleeping or lying awake silently when approached) and appeared somewhat pleased in response to his participation in physical therapy today. No suicidal, homicidal, or aggressive ideation, plan, intent, or behavior reported, with the patient expressing a desire to continue living. No evidence of psychosis, paranoia, tyler, or response to internal stimuli. Grossly intact cognitively though patient memory and thought process should continue to be monitored. At the present time the patient does not appear to be at imminent risk of harm to self or others. No psychiatric contraindications to discharge.    :::Recommendations:::  - No need for 1:1 observation for suicidality. Consider supervised room per nursing protocol.  - Encourage positive sleep hygiene practices, including limiting sleep during the day. When medically possible, group medical visits/interventions together such that the patient has extended periods of uninterrupted sleep. Impaired sleep has a direct relationship with the development of irritability and mood disorders.   - Pain management as per primary team  - The patient greatly enjoys playing chess; a chess board is available through [employer] ([facility] [employer]); the patient is aware that he can request the board be brought.  -Psychology will follow up for individual psychotherapy  - Contact CL with any questions/concerns  - No psychiatric contraindications to discharge   77 year old male with history of substance use and no significant past medical history (poor medical follow up, last PCP visit 20 years prior to admission), presenting with unresponsiveness from nursing home to [facility], found to be in Aflutter w RVR with subsegmental PE RUL, transferred to [facility], found to have LV thrombus and worsening HF, EF 10-15%. 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 Course further complicated by LLE pulselessness and ulcerations that will ultimately require BKA. Psychiatry consulted due to depressed mood and suicidality.     The patient continues to present as depressed with some potential feelings of helplessness and hopelessness, appearing more somber and less irritable than in previous sessions. That said, the patient was watching television upon approach (typically he is sleeping or lying awake silently when approached) and appeared somewhat pleased in response to his participation in physical therapy today. No suicidal, homicidal, or aggressive ideation, plan, intent, or behavior reported, with the patient expressing a desire to continue living. No evidence of psychosis, paranoia, tyler, or response to internal stimuli. Grossly intact cognitively though patient memory and thought process should continue to be monitored. At the present time the patient does not appear to be at imminent risk of harm to self or others. No psychiatric contraindications to discharge.    :::Recommendations:::  -Continue mirtazapine 30mg po qhs for treatment of depression/insomnia  - No need for 1:1 observation for suicidality  - Encourage positive sleep hygiene practices, including limiting sleep during the day. When medically possible, group medical visits/interventions together such that the patient has extended periods of uninterrupted sleep. Impaired sleep has a direct relationship with the development of irritability and mood disorders.   - Pain management as per primary team  - The patient greatly enjoys playing chess; a chess board is available through [employer] ([employer]); the patient is aware that he can request the board be brought.  -Psychology will follow up for individual psychotherapy  - Contact CL with any questions/concerns  - No psychiatric contraindications to discharge

## 2023-01-25 NOTE — CONSULT NOTE ADULT - SUBJECTIVE AND OBJECTIVE BOX
Consultation Requested by:    Patient is a 77y old  Male who presents with a chief complaint of A flutter (25 Jan 2023 12:13)    HPI:  Patient is a 78 y/o male with no significant past medical history BIBA from [facility] due to unresponsiveness. Patient was reportedly found unresponsive at his nursing home, Narcan was given in the field with resolution, and patient was taken to [facility]. Pt was persistently tachycardic in 130-140s despite receiving Adenosine 6mg IV and then 12mg IV, Lopressor 2.5mg x2, PO Metoprolol tartrate 100mg. Utox was positive for opioids. CTPA suggestive of subsegmental PE in the right upper lobe. Echo showed severely reduced LV function with an LV thrombus. Heparin gtt was started. Cardiology and EP were consulted due to atrial flutter. EP suggested transfer to [facility] for LORENZO and possible ablation.  (07 Dec 2022 12:26)      REVIEW OF SYSTEMS  All review of systems negative, except for those marked:  General:		[] Abnormal:  	[] Night Sweats		[] Fever		[] Weight Loss  Pulmonary/Cough:	[] Abnormal:  Cardiac/Chest Pain:	[] Abnormal:  Gastrointestinal:   	[] Abnormal:  Eyes:			        [] Abnormal:  ENT:		         	[] Abnormal:  Dysuria:		                [] Abnormal:  Musculoskeletal	:	[] Abnormal:  Endocrine:		        [] Abnormal:  Lymph Nodes:		[] Abnormal:  Headache:		        [] Abnormal:  Skin:			        [] Abnormal:  Allergy/Immune:       	[] Abnormal:  Psychiatric:		        [] Abnormal:  [] All other review of systems negative  [] Unable to obtain (explain):    Recent Ill Contacts:	[] No	[] Yes:  Recent Travel History:	[] No	[] Yes:  Recent Animal/Insect Exposure/Tick Bites:	[] No	[] Yes:    Allergies    No Known Allergies    Intolerances      Antimicrobials:      Other Medications:  acetaminophen     Tablet .. 650 milliGRAM(s) Oral every 6 hours PRN  aMIOdarone    Tablet 100 milliGRAM(s) Oral every 24 hours  ascorbic acid 500 milliGRAM(s) Oral daily  chlorhexidine 2% Cloths 1 Application(s) Topical <User Schedule>  chlorhexidine 2% Cloths 1 Application(s) Topical <User Schedule>  dextrose 5%. 1000 milliLiter(s) IV Continuous <Continuous>  dextrose 5%. 1000 milliLiter(s) IV Continuous <Continuous>  dextrose Oral Gel 15 Gram(s) Oral once PRN  diphenoxylate/atropine 2 Tablet(s) Oral every 8 hours  enoxaparin Injectable 60 milliGRAM(s) SubCutaneous every 12 hours  fat emulsion (Fish Oil and Plant Based) 20% Infusion 0.31 Gm/kG/Day IV Continuous <Continuous>  glucagon  Injectable 1 milliGRAM(s) IntraMuscular once  influenza  Vaccine (HIGH DOSE) 0.7 milliLiter(s) IntraMuscular once  insulin lispro (ADMELOG) corrective regimen sliding scale   SubCutaneous every 6 hours  lactated ringers. 1000 milliLiter(s) IV Continuous <Continuous>  loperamide 4 milliGRAM(s) Oral every 8 hours  melatonin 3 milliGRAM(s) Oral at bedtime PRN  metoprolol succinate ER 25 milliGRAM(s) Oral daily  mirtazapine 30 milliGRAM(s) Oral at bedtime  multivitamin 1 Tablet(s) Oral daily  ondansetron Injectable 4 milliGRAM(s) IV Push every 6 hours PRN  opium Tincture 6 milliGRAM(s) Oral every 12 hours  oxyCODONE    IR 5 milliGRAM(s) Oral every 6 hours PRN  pantoprazole    Tablet 40 milliGRAM(s) Oral two times a day  Parenteral Nutrition - Adult 1 Each TPN Continuous <Continuous>  psyllium Powder 1 Packet(s) Oral every 8 hours  sacubitril 24 mG/valsartan 26 mG 1 Tablet(s) Oral two times a day  silver sulfADIAZINE 1% Cream 1 Application(s) Topical daily  sodium chloride 0.9% lock flush 10 milliLiter(s) IV Push every 1 hour PRN  spironolactone 25 milliGRAM(s) Oral daily  zinc sulfate 220 milliGRAM(s) Oral daily      FAMILY HISTORY:    PAST MEDICAL & SURGICAL HISTORY:    SOCIAL HISTORY:    IMMUNIZATIONS  [] Up to Date		[] Not Up to Date:  Recent Immunizations:	[] No	[] Yes:    Daily     Daily   Head Circumference:  Vital Signs Last 24 Hrs  T(C): 37.4 (25 Jan 2023 13:05), Max: 38.2 (24 Jan 2023 21:30)  T(F): 99.4 (25 Jan 2023 13:05), Max: 100.8 (24 Jan 2023 21:30)  HR: 108 (25 Jan 2023 11:29) (94 - 110)  BP: 127/79 (25 Jan 2023 11:29) (115/62 - 134/67)  BP(mean): 96 (25 Jan 2023 11:29) (76 - 96)  RR: 18 (25 Jan 2023 11:28) (16 - 18)  SpO2: 98% (25 Jan 2023 11:28) (98% - 99%)    Parameters below as of 25 Jan 2023 11:28  Patient On (Oxygen Delivery Method): room air        PHYSICAL EXAM    Respiratory Support:		[] No	[] Yes:  Vasoactive medication infusion:	[] No	[] Yes:  Venous catheters:		[] No	[] Yes:  Bladder catheter:		        [] No	[] Yes:  Other catheters or tubes:	[] No	[] Yes:    Lab Results:                        9.8    15.88 )-----------( 478      ( 25 Jan 2023 05:30 )             32.7     01-25    136  |  105  |  13  ----------------------------<  119<H>  4.1   |  23  |  0.77    Ca    8.7      25 Jan 2023 05:30  Phos  3.3     01-25  Mg     1.9     01-25              MICROBIOLOGY  [] Pathology slides reviewed and/or discussed with pathologist  [] Microbiology findings discussed with microbiologist or slides reviewed  [] Images erviewed with radiologist  [] Case discussed with an attending physician in addition to the patient's primary physician  [] Records, reports from outside [facility] reviewed    [] Patient requires continued monitoring for:  [] Total time spent by attending physician: __ minutes, excluding procedure time.

## 2023-01-25 NOTE — PROGRESS NOTE ADULT - SUBJECTIVE AND OBJECTIVE BOX
Feeling chronic pain, and the only way that his back feels comfortable is if his leans over to the right.  Denies feeling fevers or chills.     Remaining ROS negative       PHYSICAL EXAM:    General: no acute distress, lying in bed with sheet over part of head  HEENT: atraumatic, dry mm  Cardiovascular: +S1/S2, RRR  Respiratory: CTAB, no wheeze  Gastrointestinal: soft, NT/ND; +BSx4  Extremities: gangrene of left lower extremity  Neurological: no focal deficits      VITAL SIGNS:  Vital Signs Last 24 Hrs  T(C): 37.4 (25 Jan 2023 13:05), Max: 38.2 (24 Jan 2023 21:30)  T(F): 99.4 (25 Jan 2023 13:05), Max: 100.8 (24 Jan 2023 21:30)  HR: 108 (25 Jan 2023 11:29) (94 - 110)  BP: 127/79 (25 Jan 2023 11:29) (115/62 - 134/67)  BP(mean): 96 (25 Jan 2023 11:29) (76 - 96)  RR: 18 (25 Jan 2023 11:28) (16 - 18)  SpO2: 98% (25 Jan 2023 11:28) (98% - 99%)    Parameters below as of 25 Jan 2023 11:28  Patient On (Oxygen Delivery Method): room air          MEDICATIONS:  MEDICATIONS  (STANDING):  aMIOdarone    Tablet 100 milliGRAM(s) Oral every 24 hours  ascorbic acid 500 milliGRAM(s) Oral daily  chlorhexidine 2% Cloths 1 Application(s) Topical <User Schedule>  chlorhexidine 2% Cloths 1 Application(s) Topical <User Schedule>  dextrose 5%. 1000 milliLiter(s) (50 mL/Hr) IV Continuous <Continuous>  dextrose 5%. 1000 milliLiter(s) (100 mL/Hr) IV Continuous <Continuous>  diphenoxylate/atropine 2 Tablet(s) Oral every 8 hours  enoxaparin Injectable 60 milliGRAM(s) SubCutaneous every 12 hours  fat emulsion (Fish Oil and Plant Based) 20% Infusion 0.31 Gm/kG/Day (8.33 mL/Hr) IV Continuous <Continuous>  glucagon  Injectable 1 milliGRAM(s) IntraMuscular once  influenza  Vaccine (HIGH DOSE) 0.7 milliLiter(s) IntraMuscular once  insulin lispro (ADMELOG) corrective regimen sliding scale   SubCutaneous every 6 hours  lactated ringers. 1000 milliLiter(s) (50 mL/Hr) IV Continuous <Continuous>  loperamide 4 milliGRAM(s) Oral every 8 hours  metoprolol succinate ER 25 milliGRAM(s) Oral daily  mirtazapine 30 milliGRAM(s) Oral at bedtime  multivitamin 1 Tablet(s) Oral daily  opium Tincture 6 milliGRAM(s) Oral every 12 hours  pantoprazole    Tablet 40 milliGRAM(s) Oral two times a day  Parenteral Nutrition - Adult 1 Each (63 mL/Hr) TPN Continuous <Continuous>  psyllium Powder 1 Packet(s) Oral every 8 hours  sacubitril 24 mG/valsartan 26 mG 1 Tablet(s) Oral two times a day  silver sulfADIAZINE 1% Cream 1 Application(s) Topical daily  spironolactone 25 milliGRAM(s) Oral daily  zinc sulfate 220 milliGRAM(s) Oral daily    MEDICATIONS  (PRN):  acetaminophen     Tablet .. 650 milliGRAM(s) Oral every 6 hours PRN Temp greater or equal to 38C (100.4F), Mild Pain (1 - 3)  dextrose Oral Gel 15 Gram(s) Oral once PRN Blood Glucose LESS THAN 70 milliGRAM(s)/deciliter  melatonin 3 milliGRAM(s) Oral at bedtime PRN Insomnia  ondansetron Injectable 4 milliGRAM(s) IV Push every 6 hours PRN Nausea and/or Vomiting  oxyCODONE    IR 5 milliGRAM(s) Oral every 6 hours PRN Severe Pain (7 - 10)  sodium chloride 0.9% lock flush 10 milliLiter(s) IV Push every 1 hour PRN Pre/post blood products, medications, blood draw, and to maintain line patency      ALLERGIES:  Allergies    No Known Allergies    Intolerances        LABS:                        9.8    15.88 )-----------( 478      ( 25 Jan 2023 05:30 )             32.7     01-25    136  |  105  |  13  ----------------------------<  119<H>  4.1   |  23  |  0.77    Ca    8.7      25 Jan 2023 05:30  Phos  3.3     01-25  Mg     1.9     01-25          CAPILLARY BLOOD GLUCOSE          RADIOLOGY & ADDITIONAL TESTS: Reviewed.

## 2023-01-25 NOTE — PROGRESS NOTE ADULT - ASSESSMENT
77M with PMHx of IVDU found unresponsive at his nursing home, resolved after Narcan in the field and brought to [facility]. Found to have PE, atrial flutter, and large LV thrombus on echo. Transferred to [facility] for further management. Pt c/o abdominal pain on 12/10 CTA showing mid SMA with embolus. Abnormal distal small bowel loops and cecum with dilatation and pneumatosis suggesting infarcted bowel. One or two tiny foci of intrahepatic portal vein pneumatosis. Segmental infarction upper pole left kidney. Now s/p Ex lap, SMA embolectomy, 80cm SBR, abthera vac left in discontinuity (12/11) and transferred to SICU intubated. S/p second look (12/13) and most recently s/p OR for 3rd look, end-to-end anastomosis of remaining bowel, loop ileostomy and abdomen closure (12/15). LLE ischemia, AKA delayed by nutritional optimization.    Regular diet w/ ensures/IVF  Pain/nausea control  A flutter now s/p cardioversion, amio 100 QD, toprol XL 25 QD  LV thrombus w LLE limb ischemia  SQL/OOBA/IS  AKA likely outpatient  AM labs  Dispo: ROWAN pending auth and placement

## 2023-01-25 NOTE — PROGRESS NOTE ADULT - ATTENDING COMMENTS
8 YO M with a history of cocaine/opiate abuse and resident of a nursing facility who was brought to [facility] with unresponsiveness that was alleviated with narcan and found to be in rapid atrial flutter with severe LV dysfunction with mobile LV thrombus and subsegemental PE prompting transfer to [facility] on 12/7. He was also found to have limb ischemia with arterial thrombosis of LLE vessels. He was being considered for rhythm control when he developed abdominal pain and found to have acute mesenteric ischemia and underwent emergent bowel resection and SMA thrombectomy. His LV thrombus us no longer apparent on TTE and has likely embolized. He had a prolonged open abdomen which is now closed with placement of ostomy. His course has been further complicated by hemodynamically significant GI bleedin. He underwent LORENZO/DCCV on 12/30 and is in sinus rhythm. He is euvolemic on exam. He is awaiting AKA but may be deferred due to severe malnutrition. Overall prognosis is poor    REVIEW OF STUDIES  TTE: LV 5.0 cm, LVEF 10-15% with global hypokinesis, 2.5 cm mobile LV thrombus, severe RV dysfunction  LORENZO: no ISIAH thrombus    PLAN  # Acute systolic heart failure  - Etiology is possibly tachycardia induced and now s/p DCCV. ischemic evaluation as outpatient if LVEF remains reduced pending clinical status  - GDMT: start entresto 24-26 mg BID and spironolactone 25 mg daily. continue metoprolol succinate 25 mg daily   - Euvolemic off diuretics  - ICD not indicated due to poor prognosis and infectious risk with his sacral decubitus ulcer and leg wounds   - He is DNR/DNI     # Atrial flutter  - EP following, now s/p DCCV 12/30  - continue uninterrupted a/c, favor DOAC before discharge  - on amiodarone per EP    # Acute limb ischemia and mesenteric ischemia from LV thrombus  - management per surgery/vascular, currently awaiting AKA  - continue anticoagulation    HF team will sign off, please call HF when closer to discharge so we can arrange followup

## 2023-01-25 NOTE — PROGRESS NOTE ADULT - SUBJECTIVE AND OBJECTIVE BOX
SUBJECTIVE: Pt seen and examined by chief resident. Pt is doing well, resting comfortably on bed. C/o ostomy bag leaking.      Vital Signs Last 24 Hrs  T(C): 36.7 (25 Jan 2023 04:43), Max: 38.2 (24 Jan 2023 21:30)  T(F): 98 (25 Jan 2023 04:43), Max: 100.8 (24 Jan 2023 21:30)  HR: 102 (25 Jan 2023 04:50) (94 - 104)  BP: 132/64 (25 Jan 2023 04:50) (115/62 - 161/77)  BP(mean): 87 (25 Jan 2023 04:50) (76 - 111)  RR: 18 (25 Jan 2023 04:50) (16 - 18)  SpO2: 98% (25 Jan 2023 04:50) (98% - 99%)    Parameters below as of 25 Jan 2023 04:50  Patient On (Oxygen Delivery Method): room air        I&O's Summary    24 Jan 2023 07:01  -  25 Jan 2023 07:00  --------------------------------------------------------  IN: 2570 mL / OUT: 3550 mL / NET: -980 mL        Physical Exam:  General Appearance: Appears well, NAD  Pulmonary: Nonlabored breathing, no respiratory distress  Abdomen: soft, NT/ND. ileostomy with stool in bag  Extremities: WWP, SCD's in place     LABS:                        9.5    15.02 )-----------( 421      ( 24 Jan 2023 10:09 )             30.2     01-24    134<L>  |  103  |  11  ----------------------------<  123<H>  4.1   |  20<L>  |  0.64    Ca    8.6      24 Jan 2023 10:09  Phos  3.0     01-24  Mg     1.8     01-24

## 2023-01-26 NOTE — BH CONSULTATION LIAISON PROGRESS NOTE - NSBHCONSULTPRIMARYDISCUSSYES_PSY_A_CORE FT
Will discuss with primary team about the patients current situation and recommendations. 
d/w surgical team
Primary team informed to continue CO 1:1 given that staff reported new onset suicidal ideation of increased intensity.  CL Psychiatry to reassess in the AM.

## 2023-01-26 NOTE — PROGRESS NOTE ADULT - ASSESSMENT
ID reconsulted for what appear to be improving fevers and gradually increasing leukocytosis. Blood cultures were consistently recommended by me (see note 1/19, 1/20) in this febrile patient with PICC on TPN (cited increased risk of invasive candidiasis); blood culture finally sent by primary team 1/25/23--to f/u gram stain and preliminary results. Fevers previously attributed to intra-abdominal hematoma. No obvious sign/source of infection on exam. CXR reviewed--appears clear although L base not captured/visualized.     Recommendations:    - BCx 1/25 NGTD, U/A negative   - If WBC continues to increase, would have low threshold to repeat CT AP (?evolving hematoma or other intra-abdominal process).  - No specific antibiotic recommendations at this time; fevers are not always infectious    ID Team 1 Will sign off. Note not complete until attested by Attending.    Plan Discussed with Attending Dr. Yashira Stewart

## 2023-01-26 NOTE — PROGRESS NOTE ADULT - ASSESSMENT
77y y/o Male with significant PMHx of IVDU found unresponsive at his nursing home, resolved after Narcan in the field, and brought to [facility]. Found to have PE, atrial flutter, and large LV thrombus on echo. Transferred to [facility] for further management. Pt C/o abdominal pain on 12/10 CTA showing mid SMA with embolus. Abnormal distal small bowel loops and cecum with dilatation and pneumatosis suggesting infarcted bowel. One or two tiny foci of  intrahepatic portal vein pneumatosis. Segmental infarction upper pole left kidney. Now s/p Ex lap, SMA embolectomy, 80cm SBR, abthera vac left in discontinuity (12/11) and transferred to SICU intubated. S/p second look (12/13) and most recently s/p OR for 3rd look, end-to-end anastomosis of remaining bowel, loop ileostomy and abdomen closure (12/15).  LLE Ischemia has not yet fully demarcated, it is clear that the posterior calf (which is needed to heal a BKA flap) is involved and therefore only surgical option available to the patient is an AKA pending medical optimization. LLE gangrene dry at this time.     Recommendations:    - L LE unchanged from prior examinations, dry gangrene extending from toes to just below the knee, no wet gangrene observed; even though it can't be ruled out without proper imaging, low suspicion for osteomyelitis  - If clinically feasible from primary team perspective, patient okay to be discharged, no vascular surgery intervention precluding discharge  - Patient can represent at another time when nutritional status is optimized for elective amputation  - Continue daily local wound care with betadine to all skin below the line of demarcation of his left shin and Kerlix. No offloading boot, keep left heel elevated off bed.   - Continue supportive measures  - Rest of care per primary team   - Vascular surgery Team 3C will continue to follow. Please call [phone] with any questions or concerns

## 2023-01-26 NOTE — PROGRESS NOTE ADULT - SUBJECTIVE AND OBJECTIVE BOX
INTERVAL EVENTS:  -- Tmax of 100.6  -- Agreed to blood cultures yesterday     SUBJECTIVE:  -- endorses poor sleep and poor appetite but denies fevers, chills and pain   -- Review of Systems: 12 point review of systems otherwise negative    MEDICATIONS:  MEDICATIONS  (STANDING):  aMIOdarone    Tablet 100 milliGRAM(s) Oral every 24 hours  ascorbic acid 500 milliGRAM(s) Oral daily  chlorhexidine 2% Cloths 1 Application(s) Topical <User Schedule>  chlorhexidine 2% Cloths 1 Application(s) Topical <User Schedule>  dextrose 5%. 1000 milliLiter(s) (50 mL/Hr) IV Continuous <Continuous>  dextrose 5%. 1000 milliLiter(s) (100 mL/Hr) IV Continuous <Continuous>  diphenoxylate/atropine 2 Tablet(s) Oral every 8 hours  enoxaparin Injectable 60 milliGRAM(s) SubCutaneous every 12 hours  fat emulsion (Fish Oil and Plant Based) 20% Infusion 0.31 Gm/kG/Day (8.33 mL/Hr) IV Continuous <Continuous>  fat emulsion (Fish Oil and Plant Based) 20% Infusion 0.7764 Gm/kG/Day (20.8 mL/Hr) IV Continuous <Continuous>  glucagon  Injectable 1 milliGRAM(s) IntraMuscular once  influenza  Vaccine (HIGH DOSE) 0.7 milliLiter(s) IntraMuscular once  insulin lispro (ADMELOG) corrective regimen sliding scale   SubCutaneous every 6 hours  loperamide 4 milliGRAM(s) Oral every 8 hours  metoprolol succinate ER 25 milliGRAM(s) Oral daily  mirtazapine 30 milliGRAM(s) Oral at bedtime  multivitamin 1 Tablet(s) Oral daily  opium Tincture 6 milliGRAM(s) Oral every 12 hours  pantoprazole    Tablet 40 milliGRAM(s) Oral two times a day  Parenteral Nutrition - Adult 1 Each (83 mL/Hr) TPN Continuous <Continuous>  Parenteral Nutrition - Adult 1 Each (63 mL/Hr) TPN Continuous <Continuous>  psyllium Powder 1 Packet(s) Oral every 8 hours  sacubitril 24 mG/valsartan 26 mG 1 Tablet(s) Oral two times a day  silver sulfADIAZINE 1% Cream 1 Application(s) Topical daily  spironolactone 25 milliGRAM(s) Oral daily  zinc sulfate 220 milliGRAM(s) Oral daily    MEDICATIONS  (PRN):  acetaminophen     Tablet .. 650 milliGRAM(s) Oral every 6 hours PRN Temp greater or equal to 38C (100.4F), Mild Pain (1 - 3)  dextrose Oral Gel 15 Gram(s) Oral once PRN Blood Glucose LESS THAN 70 milliGRAM(s)/deciliter  melatonin 3 milliGRAM(s) Oral at bedtime PRN Insomnia  ondansetron Injectable 4 milliGRAM(s) IV Push every 6 hours PRN Nausea and/or Vomiting  oxyCODONE    IR 5 milliGRAM(s) Oral every 6 hours PRN Severe Pain (7 - 10)  sodium chloride 0.9% lock flush 10 milliLiter(s) IV Push every 1 hour PRN Pre/post blood products, medications, blood draw, and to maintain line patency    Allergies  No Known Allergies    OBJECTIVE:  Vital Signs Last 24 Hrs  T(C): 36.9 (2023 09:08), Max: 38.1 (2023 22:12)  T(F): 98.5 (2023 09:08), Max: 100.6 (2023 22:12)  HR: 108 (2023 08:29) (104 - 110)  BP: 129/75 (2023 08:29) (117/69 - 150/63)  BP(mean): 91 (2023 08:29) (82 - 96)  RR: 18 (2023 08:29) (17 - 18)  SpO2: 95% (2023 08:29) (95% - 99%)    Parameters below as of 2023 08:29  Patient On (Oxygen Delivery Method): room air    I&O's Summary    2023 07:01  -  2023 07:00  --------------------------------------------------------  IN: 2367.7 mL / OUT: 4025 mL / NET: -1657.3 mL    2023 07:01  -  2023 10:59  --------------------------------------------------------  IN: 63 mL / OUT: 250 mL / NET: -187 mL    PHYSICAL EXAM:  Gen: NAD, sitting upright in bed but slouched over, cachetic   HEENT: NCAT, MMM, clear OP  CV: RRR, no m/g/r appreciated  Pulm: CTA B, no w/r/r; no increase in WOB  Abd: soft, NTND  Ext: LLE dry gangrene, RLE +SCD  Neuro: AOx3, nonfocal  Psych: blunted affect, conversant and appropriate    LABS:                        9.3    14.09 )-----------( 473      ( 2023 05:30 )             29.3         132<L>  |  103  |  17  ----------------------------<  122<H>  3.6   |  21<L>  |  0.60    Ca    8.2<L>      2023 05:30  Phos  2.6       Mg     1.6         Urinalysis Basic - ( 2023 20:03 )  Color: Yellow / Appearance: SL Cloudy / S.025 / pH: x  Gluc: x / Ketone: NEGATIVE  / Bili: Negative / Urobili: 0.2 E.U./dL   Blood: x / Protein: Trace mg/dL / Nitrite: NEGATIVE   Leuk Esterase: NEGATIVE / RBC: < 5 /HPF / WBC < 5 /HPF   Sq Epi: x / Non Sq Epi: x / Bacteria: Present /HPF    MICRODATA:  Urinalysis with Rflx Culture (collected 2023 20:03)    Culture - Blood (collected 2023 11:56)  Source: .Blood Blood-Peripheral  Preliminary Report (2023 01:00):    No growth at 12 hours    Culture - Blood (collected 2023 11:55)  Source: .Blood Blood-Peripheral  Preliminary Report (2023 01:00):    No growth at 12 hours    RADIOLOGY/OTHER STUDIES:  -- CXR (, my read) - clear lung fields

## 2023-01-26 NOTE — PROVIDER CONTACT NOTE (OTHER) - ACTION/TREATMENT ORDERED:
Per provider- will assign a one to one to sit with the patient, and will consult psych to evaluate the patient

## 2023-01-26 NOTE — BH CONSULTATION LIAISON PROGRESS NOTE - NSBHINDICATION_PSY_ALL_CORE
Worsening depression; suicidal ideation
verbalized SI to the nursing staff
suicide/self-harm

## 2023-01-26 NOTE — PROVIDER CONTACT NOTE (OTHER) - SITUATION
Patient expressing suicidal thoughts and mentioned he wants to call someone to bring him items that can help him commit suicide

## 2023-01-26 NOTE — PROGRESS NOTE ADULT - SUBJECTIVE AND OBJECTIVE BOX
Infectious Diseases Progress Note:    SUBJECTIVE: Patient seen and examined at bedside. Patient denies fever, chills, HA, nausea, vomiting, abdominal pain, dysuria, diarrhea.    AFIB      Pulmonary thromboembolism    Atrial flutter    Acute systolic congestive heart failure    Abdominal pain    Encounter for palliative care    Therapeutic opioid induced constipation    Difficulty coping with disease    Heart failure    Advance care planning    Debility    Acute mesenteric ischemia    Septic shock    Limb ischemia    Poor appetite    Hemorrhagic shock    Acute systolic congestive heart failure    Atrial flutter    Mesenteric ischemia    GI bleed    Lower limb ischemia    Severe protein-calorie malnutrition        Allergies    No Known Allergies    Intolerances        ANTIBIOTICS/RELEVANT:  antimicrobials    immunologic:  influenza  Vaccine (HIGH DOSE) 0.7 milliLiter(s) IntraMuscular once      OTHER:  acetaminophen     Tablet .. 650 milliGRAM(s) Oral every 6 hours PRN  aMIOdarone    Tablet 100 milliGRAM(s) Oral every 24 hours  ascorbic acid 500 milliGRAM(s) Oral daily  chlorhexidine 2% Cloths 1 Application(s) Topical <User Schedule>  chlorhexidine 2% Cloths 1 Application(s) Topical <User Schedule>  dextrose 5%. 1000 milliLiter(s) IV Continuous <Continuous>  dextrose 5%. 1000 milliLiter(s) IV Continuous <Continuous>  dextrose Oral Gel 15 Gram(s) Oral once PRN  diphenoxylate/atropine 2 Tablet(s) Oral every 8 hours  enoxaparin Injectable 60 milliGRAM(s) SubCutaneous every 12 hours  fat emulsion (Fish Oil and Plant Based) 20% Infusion 0.31 Gm/kG/Day IV Continuous <Continuous>  glucagon  Injectable 1 milliGRAM(s) IntraMuscular once  insulin lispro (ADMELOG) corrective regimen sliding scale   SubCutaneous every 6 hours  loperamide 4 milliGRAM(s) Oral every 8 hours  magnesium sulfate  IVPB 2 Gram(s) IV Intermittent once  melatonin 3 milliGRAM(s) Oral at bedtime PRN  metoprolol succinate ER 25 milliGRAM(s) Oral daily  mirtazapine 30 milliGRAM(s) Oral at bedtime  multivitamin 1 Tablet(s) Oral daily  ondansetron Injectable 4 milliGRAM(s) IV Push every 6 hours PRN  opium Tincture 6 milliGRAM(s) Oral every 12 hours  oxyCODONE    IR 5 milliGRAM(s) Oral every 6 hours PRN  pantoprazole    Tablet 40 milliGRAM(s) Oral two times a day  Parenteral Nutrition - Adult 1 Each TPN Continuous <Continuous>  potassium chloride   Powder 40 milliEquivalent(s) Oral once  psyllium Powder 1 Packet(s) Oral every 8 hours  sacubitril 24 mG/valsartan 26 mG 1 Tablet(s) Oral two times a day  silver sulfADIAZINE 1% Cream 1 Application(s) Topical daily  sodium chloride 0.9% lock flush 10 milliLiter(s) IV Push every 1 hour PRN  spironolactone 25 milliGRAM(s) Oral daily  zinc sulfate 220 milliGRAM(s) Oral daily      Objective:  Vital Signs Last 24 Hrs  T(C): 36.7 (2023 05:07), Max: 38.1 (2023 22:12)  T(F): 98.1 (2023 05:07), Max: 100.6 (2023 22:12)  HR: 104 (2023 03:58) (104 - 110)  BP: 117/69 (2023 03:58) (117/69 - 150/63)  BP(mean): 82 (2023 03:58) (82 - 96)  RR: 18 (2023 03:58) (17 - 18)  SpO2: 99% (2023 00:18) (98% - 99%)    Parameters below as of 2023 03:58  Patient On (Oxygen Delivery Method): room air        PHYSICAL EXAM:  Constitutional: Well-developed, well nourished  Eyes: PERRLA, EOMI  Ear/Nose/Throat: no oral lesion, no sinus tenderness on percussion	  Neck:no JVD, no lymphadenopathy, supple  Respiratory: CTA bilateral  Cardiovascular: S1S2, RRR, no murmurs  Gastrointestinal: soft, NTND, (+) BS, no HSM  Extremities: no c/e/e  Skin: no rashes    LABS:                        9.3    14.09 )-----------( 473      ( 2023 05:30 )             29.3         132<L>  |  103  |  17  ----------------------------<  122<H>  3.6   |  21<L>  |  0.60    Ca    8.2<L>      2023 05:30  Phos  2.6       Mg     1.6             Urinalysis Basic - ( 2023 20:03 )    Color: Yellow / Appearance: SL Cloudy / S.025 / pH: x  Gluc: x / Ketone: NEGATIVE  / Bili: Negative / Urobili: 0.2 E.U./dL   Blood: x / Protein: Trace mg/dL / Nitrite: NEGATIVE   Leuk Esterase: NEGATIVE / RBC: < 5 /HPF / WBC < 5 /HPF   Sq Epi: x / Non Sq Epi: x / Bacteria: Present /HPF        MICROBIOLOGY:    Urinalysis with Rflx Culture (collected 23 @ 20:03)    Culture - Blood (collected 23 @ 11:56)  Source: .Blood Blood-Peripheral  Preliminary Report (23 @ 01:00):    No growth at 12 hours    Culture - Blood (collected 23 @ 11:55)  Source: .Blood Blood-Peripheral  Preliminary Report (23 @ 01:00):    No growth at 12 hours      Culture Results:   No growth at 12 hours ( @ 11:56)  Culture Results:   No growth at 12 hours ( @ 11:55)          RADIOLOGY & ADDITIONAL STUDIES: Infectious Diseases Progress Note:    SUBJECTIVE: Patient seen and examined at bedside, no complaints at current. Patient denies fever, chills, HA, nausea, vomiting, abdominal pain, dysuria, diarrhea.    Allergies    No Known Allergies    Intolerances        ANTIBIOTICS/RELEVANT:  antimicrobials    immunologic:  influenza  Vaccine (HIGH DOSE) 0.7 milliLiter(s) IntraMuscular once      OTHER:  acetaminophen     Tablet .. 650 milliGRAM(s) Oral every 6 hours PRN  aMIOdarone    Tablet 100 milliGRAM(s) Oral every 24 hours  ascorbic acid 500 milliGRAM(s) Oral daily  chlorhexidine 2% Cloths 1 Application(s) Topical <User Schedule>  chlorhexidine 2% Cloths 1 Application(s) Topical <User Schedule>  dextrose 5%. 1000 milliLiter(s) IV Continuous <Continuous>  dextrose 5%. 1000 milliLiter(s) IV Continuous <Continuous>  dextrose Oral Gel 15 Gram(s) Oral once PRN  diphenoxylate/atropine 2 Tablet(s) Oral every 8 hours  enoxaparin Injectable 60 milliGRAM(s) SubCutaneous every 12 hours  fat emulsion (Fish Oil and Plant Based) 20% Infusion 0.31 Gm/kG/Day IV Continuous <Continuous>  glucagon  Injectable 1 milliGRAM(s) IntraMuscular once  insulin lispro (ADMELOG) corrective regimen sliding scale   SubCutaneous every 6 hours  loperamide 4 milliGRAM(s) Oral every 8 hours  magnesium sulfate  IVPB 2 Gram(s) IV Intermittent once  melatonin 3 milliGRAM(s) Oral at bedtime PRN  metoprolol succinate ER 25 milliGRAM(s) Oral daily  mirtazapine 30 milliGRAM(s) Oral at bedtime  multivitamin 1 Tablet(s) Oral daily  ondansetron Injectable 4 milliGRAM(s) IV Push every 6 hours PRN  opium Tincture 6 milliGRAM(s) Oral every 12 hours  oxyCODONE    IR 5 milliGRAM(s) Oral every 6 hours PRN  pantoprazole    Tablet 40 milliGRAM(s) Oral two times a day  Parenteral Nutrition - Adult 1 Each TPN Continuous <Continuous>  potassium chloride   Powder 40 milliEquivalent(s) Oral once  psyllium Powder 1 Packet(s) Oral every 8 hours  sacubitril 24 mG/valsartan 26 mG 1 Tablet(s) Oral two times a day  silver sulfADIAZINE 1% Cream 1 Application(s) Topical daily  sodium chloride 0.9% lock flush 10 milliLiter(s) IV Push every 1 hour PRN  spironolactone 25 milliGRAM(s) Oral daily  zinc sulfate 220 milliGRAM(s) Oral daily      Objective:  Vital Signs Last 24 Hrs  T(C): 36.7 (2023 05:07), Max: 38.1 (2023 22:12)  T(F): 98.1 (2023 05:07), Max: 100.6 (2023 22:12)  HR: 104 (2023 03:58) (104 - 110)  BP: 117/69 (2023 03:58) (117/69 - 150/63)  BP(mean): 82 (2023 03:58) (82 - 96)  RR: 18 (2023 03:58) (17 - 18)  SpO2: 99% (2023 00:18) (98% - 99%)    Parameters below as of 2023 03:58  Patient On (Oxygen Delivery Method): room air        PHYSICAL EXAM:  Constitutional: NAD, pt is frail  Eyes: PERRLA, EOMI  Ear/Nose/Throat: no oral lesion, no sinus tenderness on percussion	  Neck: no JVD, no lymphadenopathy, supple  Respiratory: CTA bilateral  Cardiovascular: S1S2, RRR, no murmurs  Gastrointestinal: soft, NTND, (+) BS, no HSM  Extremities: L leg with demarcation to below knee      LABS:                        9.3    14.09 )-----------( 473      ( 2023 05:30 )             29.3         132<L>  |  103  |  17  ----------------------------<  122<H>  3.6   |  21<L>  |  0.60    Ca    8.2<L>      2023 05:30  Phos  2.6       Mg     1.6             Urinalysis Basic - ( 2023 20:03 )    Color: Yellow / Appearance: SL Cloudy / S.025 / pH: x  Gluc: x / Ketone: NEGATIVE  / Bili: Negative / Urobili: 0.2 E.U./dL   Blood: x / Protein: Trace mg/dL / Nitrite: NEGATIVE   Leuk Esterase: NEGATIVE / RBC: < 5 /HPF / WBC < 5 /HPF   Sq Epi: x / Non Sq Epi: x / Bacteria: Present /HPF        MICROBIOLOGY:    Urinalysis with Rflx Culture (collected 23 @ 20:03)    Culture - Blood (collected 23 @ 11:56)  Source: .Blood Blood-Peripheral  Preliminary Report (23 @ 01:00):    No growth at 12 hours    Culture - Blood (collected 23 @ 11:55)  Source: .Blood Blood-Peripheral  Preliminary Report (23 @ 01:00):    No growth at 12 hours      Culture Results:   No growth at 12 hours ( @ 11:56)  Culture Results:   No growth at 12 hours ( @ 11:55)          RADIOLOGY & ADDITIONAL STUDIES:

## 2023-01-26 NOTE — PROGRESS NOTE ADULT - SUBJECTIVE AND OBJECTIVE BOX
Subjective: Pt seen and evaluated bedside this AM. Pt sitting up complaining of back pain this morning denies pain in his left leg.     ROS:   Denies Headache, blurred vision, Chest Pain, SOB, Abdominal pain, nausea or vomiting     Social   aMIOdarone    Tablet 100  enoxaparin Injectable 60  metoprolol succinate ER 25  sacubitril 24 mG/valsartan 26 mG 1  spironolactone 25      Allergies    No Known Allergies    Intolerances        Vital Signs Last 24 Hrs  T(C): 36.7 (26 Jan 2023 05:07), Max: 38.1 (25 Jan 2023 22:12)  T(F): 98.1 (26 Jan 2023 05:07), Max: 100.6 (25 Jan 2023 22:12)  HR: 104 (26 Jan 2023 03:58) (98 - 110)  BP: 117/69 (26 Jan 2023 03:58) (117/69 - 150/63)  BP(mean): 82 (26 Jan 2023 03:58) (82 - 96)  RR: 18 (26 Jan 2023 03:58) (17 - 18)  SpO2: 99% (26 Jan 2023 00:18) (98% - 99%)    Parameters below as of 26 Jan 2023 03:58  Patient On (Oxygen Delivery Method): room air      I&O's Summary    25 Jan 2023 07:01  -  26 Jan 2023 07:00  --------------------------------------------------------  IN: 2367.7 mL / OUT: 4025 mL / NET: -1657.3 mL        Physical Exam:  General:  General: NAD, resting comfortably in bed  C/V: S1 s2, RRR  Pulm: Nonlabored breathing, no respiratory distress  Abd: Soft, NTND  Extrem: L LE dry gangrene from toes to just below the knee, unchanged from prior exam, no obvious signs of wet gangrene      LABS:                        9.3    14.09 )-----------( 473      ( 26 Jan 2023 05:30 )             29.3     01-26    132<L>  |  103  |  17  ----------------------------<  122<H>  3.6   |  21<L>  |  0.60    Ca    8.2<L>      26 Jan 2023 05:30  Phos  2.6     01-26  Mg     1.6     01-26

## 2023-01-26 NOTE — BH CONSULTATION LIAISON PROGRESS NOTE - NSBHASSESSMENTFT_PSY_ALL_CORE
77 year old male with history of substance use and no significant past medical history (poor medical follow up, last PCP visit 20 years prior to admission), presenting with unresponsiveness from nursing home to [facility], found to be in Aflutter w RVR with subsegmental PE RUL, transferred to [facility], found to have LV thrombus and worsening HF, EF 10-15%. 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 Course further complicated by LLE pulselessness and ulcerations that will ultimately require BKA. Psychiatry consulted due to depressed mood and suicidality.     The patient continues to present as depressed with some potential feelings of helplessness and hopelessness, appearing more somber and less irritable than in previous sessions. That said, the patient was watching television upon approach (typically he is sleeping or lying awake silently when approached) and appeared somewhat pleased in response to his participation in physical therapy today. No suicidal, homicidal, or aggressive ideation, plan, intent, or behavior reported, with the patient expressing a desire to continue living. No evidence of psychosis, paranoia, tyler, or response to internal stimuli. Grossly intact cognitively though patient memory and thought process should continue to be monitored. At the present time the patient does not appear to be at imminent risk of harm to self or others. No psychiatric contraindications to discharge.    :::Recommendations:::  - Continue mirtazapine 30mg po qhs for treatment of depression/insomnia  - Initiate CO 1:1 for suicidality/self-harm  - Encourage positive sleep hygiene practices, including limiting sleep during the day. When medically possible, group medical visits/interventions together such that the patient has extended periods of uninterrupted sleep. Impaired sleep has a direct relationship with the development of irritability and mood disorders.   - Pain management as per primary team  - The patient greatly enjoys playing chess; a chess board is available through [employer] (4th Floor [employer] [facility]); the patient is aware that he can request the board be brought.  -Psychology will follow up for individual psychotherapy  - Contact CL with any questions/concerns  - No psychiatric contraindications to discharge   77 year old male with history of substance use and no significant past medical history (poor medical follow up, last PCP visit 20 years prior to admission), presenting with unresponsiveness from nursing home to [facility], found to be in Aflutter w RVR with subsegmental PE RUL, transferred to [facility], found to have LV thrombus and worsening HF, EF 10-15%. 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 Course further complicated by LLE pulselessness and ulcerations that will ultimately require BKA. Psychiatry consulted due to depressed mood and suicidality.     Psychiatry called this evening to assess acute suicidality.  Per nursing staff, the patient expressed SI and was asking others to bring him a means of ending his life.  On evaluation, the patient is irritable but cooperative with good eye contact, demonstrating good behavioral control and linear thought processes.  The patient reports chronic passive SI without intent or plan but adamantly denies nursing staff's report.  He states that he has thoughts of not wanting to exist.  However, he states that he would never act on these thoughts and he states that he did not ask anyone to help him end his life.  Collateral from CO 1:1 and nursing indicate that the patient has been calm and cooperative this evening and was sleeping until he was awakened for tonight's interview.  The patient continues to present as depressed with some potential feelings of helplessness and hopelessness.  No suicidal, homicidal, or aggressive ideation, plan, intent, or behavior reported, with the patient expressing a desire to continue living. No evidence of psychosis, paranoia, tyler, or response to internal stimuli. Grossly intact cognitively though patient memory and thought process should continue to be monitored. At the present time the patient does not appear to be at imminent risk of harm to self or others. CL psychiatry to re-evaluate the patient for SI in the morning.  Patient at moderate risk of SI given acute stressors (medical conditions getting worse), feels hopeless, has passive SI with no plan; protective factors remain his motivation to improve, engagement in medical treatment, no access to means, resilience     :::Recommendations:::  - Continue mirtazapine 30mg PO qhs for treatment of depression/insomnia  - Continue CO 1:1 for suicidality/self-harm.  CL Psychiatry to reassess SI in the morning.  - Encourage positive sleep hygiene practices, including limiting sleep during the day. When medically possible, group medical visits/interventions together such that the patient has extended periods of uninterrupted sleep. Impaired sleep has a direct relationship with the development of irritability and mood disorders.   - Pain management as per primary team  - The patient greatly enjoys playing chess; a chess board is available through [employer] (4th Floor [facility]); the patient is aware that he can request the board be brought.  -Psychology will follow up for individual psychotherapy  - Contact CL with any questions/concerns

## 2023-01-26 NOTE — PROVIDER CONTACT NOTE (OTHER) - SITUATION
patient refused potassium powder stat order because of the taste, and did not want to be bothered. Patient refused potasium tablet because it is too big.

## 2023-01-26 NOTE — PROGRESS NOTE ADULT - ASSESSMENT
77M first presented to [facility] from [facility] due to unresponsiveness (responded to Narcan). At [facility], found to have subsegmental pulmonary embolism in RUL, rapid atrial flutter with severely reduced LVEF with LV thrombus. Transferred to [facility] on 12/7/22 for LORENZO and possible ablation. At [facility], was found to have left leg ischemia (left leg arterial thrombus on LE duplex on 12/8). Was being considered for rhythm control when he developed abdominal pain, CTA (12/10/22) showed embolus in SMA, bowel infarct, requiring ex-lap on 12/11 with SMA embolectomy, 80cm small bowel resection; On 12/13, underwent 2nd look laparotomy, with 80cm small bowel resection, closure with abthera. On 12/15, underwent 3rd look laparotomy, end-to-end anastomosis of remaining bowel, loop ileostomy and abdominal closure. Now extubated, on tele floor. Eventually underwent LORENZO/DCCV on 12/30/22, now in sinus rhythm. Currently being evaluated for possible above knee amputation for LLE ischemia (possibly deferring till next hospitalization, after optimization of nutritional status).      #Fever  #Leucocytosis   - does have recurrent fevers with infectious work thus negative thus far.  On a line holiday.   - f/u bcx from 1/25  - f/u ID recs    # Left leg ischemia   - planning per vascular surgery and primary team  - will need to optimize nutritional status prior to operation  - per cards, will need 30 days of anticoagulation (full dose) - 11AM on 1/29 will be 30 days  - if patient remains in the hospital - ?amputation with vascular     #Suicidal Ideation  #Insomnia   - appreciate input from psychiatry    #Elevated liver tests  - Improving  - continue to trend     # Acute Systolic Heart Failure   - Metoprolol succinate 25mg qd     # Atrial Flutter  - s/p DCCV 12/30  - EP recommending uninterrupted AC x 30 days ; * 11AM January 29th would be 30 days post DCCV.   - on Lovenox, amiodarone and metoprolol succinate    # Pulmonary embolism (subsegmental RUL)   - After completion of AC for DCCV, would need to continue AC for PE - continuing lovenox at this time    # Bowel ischemia - s/p SMA embolectomy; Small bowel resection  - ileostomy  - plan per primary team  - continue loperamide and diphenoxylate atropine    # Severe protein-calorie malnutrition   - needs nutritional optimization  - PICC line removed and current on a line holiday pending infectious work-up     # Sacral wound  - nursing wound care per nursing protocol     #Hyponatremia   - improved.  Difficult to give IV fluids, particularly with HFrEF  - daily BMP  - likely hypovolumic hyponatremia, due to poor/lack of PO intake and loose stools from ostomy    #DVT ppx: defer to  primary team  #Diet: regular + TPN  #Dispo: TBD    Rhonda Franks MD  Hospitalist Attending  [phone]

## 2023-01-26 NOTE — BH CONSULTATION LIAISON PROGRESS NOTE - NSBHFUPINTERVALHXFT_PSY_A_CORE
The patient was lying in bed awake, alert, and watching television upon approach. The writer met with the patient for further evaluation and individual therapy. The patient did not report any suicidal ideation, plan, intent, or behavior, expressing a desire to continue living. The patient continues to present as depressed, verbalizing significant frustration related to his medical health and hospitalization. No evidence of tyler, paranoia, or psychosis. The writer and patient engaged in discussion about the patient's familial relationships, his hospitalization, and current events. The writer provided emotional support throughout.  Chart and nursing notes reviewed.  No acute events overnight, VSS.  The patient continues to tolerate medications and denies any medication side effects.  Psychiatry consulted to assess recurrent SI without plan this evening.  Per primary team, nursing reported that the patient was requesting that "people bring things in to kill himself."  Primary team was instructed to place patient on CO 1:1 for self-harm/suicide until  Psychiatry could assess.  CO 1:1 and nursing staff interviewed.  They report that the patient has been calm and cooperative this evening with no expression of SI/HI, intent or plan since  Psychiatry consulted.  They report the patient has been sleeping quietly.  Staff could not provide further details of the patient's SI expressed earlier this evening.    On approach, the patient is sleeping quietly with CO 1:1 at bedside.  The patient arouses to verbal stimulation.  On evaluation, the patient is irritable but cooperative with good eye contact, demonstrating good behavioral control and linear thought processes.  The patient adamantly denies SI/HI, intent and plan.  He adamantly denies asking anyone to bring him means to self harm and asks "Who would I ask?!?  Are you serious?  What would they bring?!?"  The patient reports daily passive SI without intent or plan and states that he has thoughts of not being alive anymore.  However, he states that he would never harm himself.    The patient continues to present as depressed and verbalizes significant frustration related to his medical health and current hospitalization. No evidence of tyler, paranoia, or psychosis on interview.  Supportive psychotherapy provided.  All questions and concerns addressed.

## 2023-01-26 NOTE — PROVIDER CONTACT NOTE (OTHER) - ACTION/TREATMENT ORDERED:
as per provider, it is ok for patient to refuse the two because he is getting PPN therefore he is receiving the nutrients and vitamins he needs through the PPN.

## 2023-01-27 NOTE — PROGRESS NOTE ADULT - ASSESSMENT
77M with PMHx of IVDU found unresponsive at his nursing home, resolved after Narcan in the field and brought to [facility]. Found to have PE, atrial flutter, and large LV thrombus on echo. Transferred to [facility] for further management. Pt c/o abdominal pain on 12/10 CTA showing mid SMA with embolus. Abnormal distal small bowel loops and cecum with dilatation and pneumatosis suggesting infarcted bowel. One or two tiny foci of intrahepatic portal vein pneumatosis. Segmental infarction upper pole left kidney. Now s/p Ex lap, SMA embolectomy, 80cm SBR, abthera vac left in discontinuity (12/11) and transferred to SICU intubated. S/p second look (12/13) and most recently s/p OR for 3rd look, end-to-end anastomosis of remaining bowel, loop ileostomy and abdomen closure (12/15). LLE ischemia, AKA delayed by nutritional optimization.    Regular diet w/ ensures  PICC today/TPN  Zosyn  f/u blood cxs   Pain/nausea control  A flutter now s/p cardioversion, amio 100 QD, toprol XL 25 QD  LV thrombus w LLE limb ischemia  Lovenox  OOBA/IS  AKA likely outpatient  AM labs  Dispo: ROWAN pending auth and placement  IR consulted for drainage of right lower quadrant fluid collection with thin rim of enhancement. IR states unable to drain, no safe window 2/2 bowel   ID recs

## 2023-01-27 NOTE — PROGRESS NOTE ADULT - ASSESSMENT
77M first presented to [facility] from [facility] due to unresponsiveness (responded to Narcan). At [facility], found to have subsegmental pulmonary embolism in RUL, rapid atrial flutter with severely reduced LVEF with LV thrombus. Transferred to [facility] on 12/7/22 for LORENZO and possible ablation. At [facility], was found to have left leg ischemia (left leg arterial thrombus on LE duplex on 12/8). Was being considered for rhythm control when he developed abdominal pain, CTA (12/10/22) showed embolus in SMA, bowel infarct, requiring ex-lap on 12/11 with SMA embolectomy, 80cm small bowel resection; On 12/13, underwent 2nd look laparotomy, with 80cm small bowel resection, closure with abthera. On 12/15, underwent 3rd look laparotomy, end-to-end anastomosis of remaining bowel, loop ileostomy and abdominal closure. Now extubated, on tele floor. Eventually underwent LORENZO/DCCV on 12/30/22, now in sinus rhythm. Currently being evaluated for possible above knee amputation for LLE ischemia (possibly deferring till next hospitalization, after optimization of nutritional status).      #Fever  #Leucocytosis   -Pt with increased WBC on 1/27 from 14->17   - does have recurrent fevers with infectious work thus negative thus far.     - f/u bcx from 1/25 NTD x 1 day   -Continue Zosyn     # Left leg ischemia   - planning per vascular surgery and primary team  - will need to optimize nutritional status prior to operation  - per cards, will need 30 days of anticoagulation (full dose) - 11AM on 1/29 will be 30 days  - if patient remains in the hospital - ?amputation with vascular     #Suicidal Ideation  #Insomnia   - appreciate input from psychiatry  -Pt was again placed on 1:1     #Elevated liver tests  - continue to trend     # Acute Systolic Heart Failure   - Metoprolol succinate 25mg qd     # Atrial Flutter  - s/p DCCV 12/30  - EP recommending uninterrupted AC x 30 days ; * 11AM January 29th would be 30 days post DCCV.   - on Lovenox, amiodarone and metoprolol succinate    # Pulmonary embolism (subsegmental RUL)   - After completion of AC for DCCV, would need to continue AC for PE - continuing lovenox at this time    # Bowel ischemia - s/p SMA embolectomy; Small bowel resection  - ileostomy  - plan per primary team  - continue loperamide and diphenoxylate atropine    # Severe protein-calorie malnutrition   - needs nutritional optimization  - PICC line removed and current on a line holiday pending infectious work-up     # Sacral wound  - nursing wound care per nursing protocol     #Hyponatremia   - improved Pt's NA is 131.  Difficult to give IV fluids, particularly with HFrEF  -Continue  daily BMP  - likely hypovolumic hyponatremia, due to poor/lack of PO intake and loose stools from ostomy    #DVT ppx: defer to  primary team; Pt is on Lovenox   #Diet: regular + TPN  #Dispo: TBD

## 2023-01-27 NOTE — PROGRESS NOTE ADULT - SUBJECTIVE AND OBJECTIVE BOX
Subjective: Pt seen and evaluated bedside this AM. Pt does not wish to be woken up this morning however is ok with us changing his dressing. Pts LLE dressing is C/D/I.     ROS:   Denies Headache, blurred vision, Chest Pain, SOB, Abdominal pain, nausea or vomiting     Social   piperacillin/tazobactam IVPB. 3.375  piperacillin/tazobactam IVPB.- 3.375  piperacillin/tazobactam IVPB.. 3.375  aMIOdarone    Tablet 100  enoxaparin Injectable 60  metoprolol succinate ER 25  piperacillin/tazobactam IVPB. 3.375  piperacillin/tazobactam IVPB.- 3.375  piperacillin/tazobactam IVPB.. 3.375  sacubitril 24 mG/valsartan 26 mG 1  spironolactone 25      Allergies    No Known Allergies    Intolerances        Vital Signs Last 24 Hrs  T(C): 36.8 (27 Jan 2023 09:41), Max: 37.2 (26 Jan 2023 18:05)  T(F): 98.2 (27 Jan 2023 09:41), Max: 98.9 (26 Jan 2023 18:05)  HR: 108 (27 Jan 2023 09:00) (94 - 126)  BP: 123/62 (27 Jan 2023 09:00) (64/42 - 165/92)  BP(mean): 83 (27 Jan 2023 09:00) (49 - 116)  RR: 14 (27 Jan 2023 09:00) (14 - 18)  SpO2: 98% (27 Jan 2023 09:00) (95% - 100%)    Parameters below as of 27 Jan 2023 09:00  Patient On (Oxygen Delivery Method): room air      I&O's Summary    26 Jan 2023 07:01  -  27 Jan 2023 07:00  --------------------------------------------------------  IN: 2808 mL / OUT: 4050 mL / NET: -1242 mL        Physical Exam:  General: NAD, resting comfortably in bed  C/V: S1 s2, RRR  Pulm: Nonlabored breathing, no respiratory distress  Abd: Soft, NTND  Extrem: L LE dry gangrene from toes to just below the knee, unchanged from prior exam, no obvious signs of wet gangrene      LABS:                        10.1   17.41 )-----------( 455      ( 27 Jan 2023 05:42 )             31.0     01-27    131<L>  |  100  |  22  ----------------------------<  127<H>  3.6   |  20<L>  |  0.58    Ca    8.5      27 Jan 2023 05:42  Phos  2.8     01-27  Mg     2.0     01-27

## 2023-01-27 NOTE — PROGRESS NOTE ADULT - ASSESSMENT
ID reconsulted for what appear to be improving fevers and gradually increasing leukocytosis. Blood cultures were consistently recommended by me (see note 1/19, 1/20) in this febrile patient with PICC on TPN (cited increased risk of invasive candidiasis); blood culture finally sent by primary team 1/25/23--to f/u gram stain and preliminary results. Fevers previously attributed to intra-abdominal hematoma. CT A/P 1/26 with findings of Persistent cecal mural edema and mucosal hyperenhancement, probably inflammatory or infectious    Recommendations:  - BCx 1/25 NGTD  - Continue Zosyn 3.375g IV q8hrs   - Recommend IR consult for drainage of collection -- send culture     ID Team 1 following. Note not complete until attested by Attending.    Plan Discussed with Attending Dr. 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 Resolving fevers and gradually increasing leukocytosis. Blood culture sent by primary team 1/25/23--ngtd. Fevers previously attributed to intra-abdominal hematoma. CT A/P 1/26 with findings of "Persistent cecal mural edema and mucosal hyperenhancement, probably inflammatory or infectious"    Recommendations:  - BCx 1/25 NGTD  - Continue Zosyn 3.375g IV q8hrs   - Recommend IR consult for drainage of collection -- send culture     ID Team 1 following. Note not complete until attested by Attending.    Plan Discussed with Attending Dr. Yashira Stewart

## 2023-01-27 NOTE — BH CONSULTATION LIAISON PROGRESS NOTE - OTHER
grossly normal stable posture in bed depressive content with ruminations, frustrations with health. no voiced delusions or paranoia. No current SI or HI

## 2023-01-27 NOTE — CHART NOTE - NSCHARTNOTEFT_GEN_A_CORE
Admitting Diagnosis:   Patient is a 77y old  Male who presents with a chief complaint of A flutter (2023 12:57)    PAST MEDICAL & SURGICAL HISTORY:  PMHx of IVDU     Current Nutrition Order:  Regular diet with Ensure Enlive TID (1050 kcal, 60g protein, 540mL free H2O)  Ordered for TPN regimen (pending PICC this am per RN): 2000 ml TV @ 83 ml/hr containing 275g Dex, 90g AA, 50g SMOF lipids to provide 1795 kcal, 90g pro, GIR 2.93, 1.38 g/kg pro ABW     PO Intake: Good (%) [   ]  Fair (50-75%) [   ] Poor (<25%) [ x  ]- <50% est needs. Please see subjective**    GI Issues:   WDL, Ileostomy (2900 ml/24hrs)  No n/v/d/c  No abd distention or discomfort noted  Inguinal hernia bump noted    Pain:  10/10 severe leg pain noted- on pain regimen    Skin Integrity:  Surgical incision, carla score 13  No pain noted   Pressure ulcer; Stg II sacrum spine  **Ordered for Zinc Sulfate (220 mg daily), Vit C 500mg daily, MVI daily    Labs:       131<L>  |  100  |  22  ----------------------------<  127<H>  3.6   |  20<L>  |  0.58    Ca    8.5      2023 05:42  Phos  2.8       Mg     2.0         CAPILLARY BLOOD GLUCOSE    POCT Blood Glucose.: 140 mg/dL (2023 11:47)    Medications:  MEDICATIONS  (STANDING):  aMIOdarone    Tablet 100 milliGRAM(s) Oral every 24 hours  ascorbic acid 500 milliGRAM(s) Oral daily  chlorhexidine 2% Cloths 1 Application(s) Topical <User Schedule>  chlorhexidine 2% Cloths 1 Application(s) Topical <User Schedule>  dextrose 5%. 1000 milliLiter(s) (100 mL/Hr) IV Continuous <Continuous>  dextrose 5%. 1000 milliLiter(s) (50 mL/Hr) IV Continuous <Continuous>  diphenoxylate/atropine 2 Tablet(s) Oral every 8 hours  enoxaparin Injectable 60 milliGRAM(s) SubCutaneous every 12 hours  fat emulsion (Fish Oil and Plant Based) 20% Infusion 0.7764 Gm/kG/Day (20.8 mL/Hr) IV Continuous <Continuous>  glucagon  Injectable 1 milliGRAM(s) IntraMuscular once  influenza  Vaccine (HIGH DOSE) 0.7 milliLiter(s) IntraMuscular once  insulin lispro (ADMELOG) corrective regimen sliding scale   SubCutaneous every 6 hours  loperamide 4 milliGRAM(s) Oral every 8 hours  metoprolol succinate ER 25 milliGRAM(s) Oral daily  mirtazapine 30 milliGRAM(s) Oral at bedtime  multivitamin 1 Tablet(s) Oral daily  opium Tincture 6 milliGRAM(s) Oral every 12 hours  pantoprazole    Tablet 40 milliGRAM(s) Oral two times a day  Parenteral Nutrition - Adult 1 Each (83 mL/Hr) TPN Continuous <Continuous>  Parenteral Nutrition - Adult 1 Each (83 mL/Hr) TPN Continuous <Continuous>  piperacillin/tazobactam IVPB.- 3.375 Gram(s) IV Intermittent once  piperacillin/tazobactam IVPB.. 3.375 Gram(s) IV Intermittent every 8 hours  psyllium Powder 1 Packet(s) Oral every 8 hours  sacubitril 24 mG/valsartan 26 mG 1 Tablet(s) Oral two times a day  silver sulfADIAZINE 1% Cream 1 Application(s) Topical daily  spironolactone 25 milliGRAM(s) Oral daily  zinc sulfate 220 milliGRAM(s) Oral daily    MEDICATIONS  (PRN):  acetaminophen     Tablet .. 650 milliGRAM(s) Oral every 6 hours PRN Temp greater or equal to 38C (100.4F), Mild Pain (1 - 3)  dextrose Oral Gel 15 Gram(s) Oral once PRN Blood Glucose LESS THAN 70 milliGRAM(s)/deciliter  melatonin 3 milliGRAM(s) Oral at bedtime PRN Insomnia  ondansetron Injectable 4 milliGRAM(s) IV Push every 6 hours PRN Nausea and/or Vomiting  sodium chloride 0.9% lock flush 10 milliLiter(s) IV Push every 1 hour PRN Pre/post blood products, medications, blood draw, and to maintain line patency    Admitting Anthropometrics:  6'6''  pounds +-10%  Wt 142 pounds BMI 16.4 %IBW=66%     Weight Change:    141.9 pounds - dosing wt    136 pounds - Bedscale wt     **PCM:  >> Reports having 1-2 meals/day + ONS. Per pt claims to have 2 ensure/day and 2 nutrament/day - unclear how accurate this is. ?If pt meeting ~75% EER consistently.  >> Does report wt loss.  pounds x6 months ago. Thinks he now is 135 pounds which is consistent with bedscale wt obtained during initial visit by  pounds. Suggest wt loss of 49 pounds/26% body wt loss.  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     Estimated energy needs:  Current body wt for EER based on clinician judgment. Adjust for age, malnutrition, EF, S/p OR, Pressure ulcer; fluids per team  25-30kcal/k-1950kcal/day   1.3-1.5gm/k-98gm prot/day   **Rec upper end of needs     Subjective:  77M with PMHx of IVDU found unresponsive at his nursing home, resolved after Narcan in the field and brought to [facility]. Found to have PE, atrial flutter, and large LV thrombus on echo. Transferred to [facility] for further management. Pt c/o abdominal pain on 12/10 CTA showing mid SMA with embolus. Abnormal distal small bowel loops and cecum with dilatation and pneumatosis suggesting infarcted bowel. One or two tiny foci of intrahepatic portal vein pneumatosis. Segmental infarction upper pole left kidney. Now s/p Ex lap, SMA embolectomy, 80cm SBR, abthera vac left in discontinuity () and transferred to SICU intubated. S/p second look () and most recently s/p OR for 3rd look, end-to-end anastomosis of remaining bowel, loop ileostomy and abdomen closure (12/15). Transferred to CCU on  for cardiac optimization and now transferred back to SICU  for bleeding hemodynamic instability. Echo  shows EF 10-15% with overall hypokinesis. CTA performed demonstrating large hematoma in R abdomen, new hemoperitoneum, and bilateral pleural effusions. Remains in SICU, now extubated with still with limb ischemia to LLE pending amputation and AC held given BRBPR and hematoma; noted pt agreeable for AKA when feasible - AKA currently Pending Cards. Pt s/p DCCV on  (negative LORENZO on ) with successful conversion to NSR. Planning for AKA with vascular surgery once nutrition status is optimize. Team placed PICC 1/10 and initiated supplemental TPN now weaned off with constant encouragement of PO intake. Per CM note. medically ready for discharge with acceptance to [facility]. Plan to place central line when feasible and restart TPN- please see recs below. Disc with team.     On assessment, pt resting in bed. Currently pt is on regular diet with Ensure Enlive TID (1050 kcal, 60g protein, 540mL free H2O). Overall appetite remains very poor meeting <25% est needs. Plan to restart TPN, please see recs below pending PICC placement per disc with RN this am. Pt cont to have multiple complaints- working closely with  host to find foods pt enjoys and encourage PO intake of food brought by family which remains at bedside. Calorie count started  with plan to finish - RD to follow  to assess results. RD cont to encourage adequate PO intake as tolerated. Cont to have family provide pt with his own food preferences- dislikes most foods on menu provided. RD to follow.     **Kcal count finished  with RD to follow up  to assess, but no sheets were available for pickup. Pt consuming <25% of meals.     Prior PES: Malnutrition Severe in Chronic state RT presumed intake<EER AEB NFPE, wt loss, PO intake  >>on going  Goal: Pt will meet at least 75% of nutrient needs via feasible route / Show no further s/s of malnutrition    Recommendations:  1. Cont with regular diet with Ensure Enlive TID (1050 kcal, 60g protein, 540mL free H2O)  2. Due to persistent malabsorption, cont with current TPN regimen to meet 100% of est needs until outpt via ostomy improves. Recommend; 275g Dex, 90g AA, 50g SMOF lipids to provide 1795 kcal, 90g pro, GIR 2.93, 1.38 g/kg pro ABW. RD to follow.  >> Cont to trend TG weekly  3. Recommend addition of MVI daily  4. Monitor Skin, Wts daily, GOC. Pain/GI per team.   >>Cont with imodium and metamucil per team  5. Labs: monitor BMP, CBC, glucose, lytes - Replete PRN, trend renal indices, LFts, POCT.  6. RD to remain available for additional Recs.    **Disc with team    Education:  Cont to encourage adequate PO intake to prevent further s/sx of malnutrition/ healthy weight gain    Risk Level: High [X   ] Moderate [   ] Low [   ].

## 2023-01-27 NOTE — CONSULT NOTE ADULT - SUBJECTIVE AND OBJECTIVE BOX
77M with PMHx of IVDU found unresponsive at his nursing home, resolved after Narcan in the field and brought to [facility]. Found to have PE, atrial flutter, and large LV thrombus on echo. Transferred to [facility] for further management. Pt c/o abdominal pain on 12/10 CTA showing mid SMA with embolus. Abnormal distal small bowel loops and cecum with dilatation and pneumatosis suggesting infarcted bowel. One or two tiny foci of intrahepatic portal vein pneumatosis. Segmental infarction upper pole left kidney. Now s/p Ex lap, SMA embolectomy, 80cm SBR, abthera vac left in discontinuity (12/11) and transferred to SICU intubated. S/p second look (12/13) and most recently s/p OR for 3rd look, end-to-end anastomosis of remaining bowel, loop ileostomy and abdomen closure (12/15). IR consulted for RLQ abdominal collection drainage.     Clinical History: AFIB    Yes    Handoff    MEWS Score    Acute mesenteric ischemia    Acute mesenteric ischemia    Adjustment disorder    Adjustment disorder with depressed mood    Pulmonary thromboembolism    Atrial flutter    Acute systolic congestive heart failure    Abdominal pain    Encounter for palliative care    Therapeutic opioid induced constipation    Difficulty coping with disease    Heart failure    Advance care planning    Debility    Acute mesenteric ischemia    Septic shock    Limb ischemia    Poor appetite    Hemorrhagic shock    Acute systolic congestive heart failure    Atrial flutter    Mesenteric ischemia    GI bleed    Lower limb ischemia    Severe protein-calorie malnutrition    Embolectomy, artery, superior mesenteric    Exploratory laparotomy    Exploratory laparotomy with small bowel resection    Small bowel resection with anastomosis    Closure of fascia of abdomen    Atrial flutter    Acute systolic congestive heart failure    Single major depressive episode, moderate    Room Service Assist    [email or web address]        Meds:acetaminophen     Tablet .. 650 milliGRAM(s) Oral every 6 hours PRN  aMIOdarone    Tablet 100 milliGRAM(s) Oral every 24 hours  ascorbic acid 500 milliGRAM(s) Oral daily  chlorhexidine 2% Cloths 1 Application(s) Topical <User Schedule>  chlorhexidine 2% Cloths 1 Application(s) Topical <User Schedule>  dextrose 5%. 1000 milliLiter(s) IV Continuous <Continuous>  dextrose 5%. 1000 milliLiter(s) IV Continuous <Continuous>  dextrose Oral Gel 15 Gram(s) Oral once PRN  diphenoxylate/atropine 2 Tablet(s) Oral every 8 hours  enoxaparin Injectable 60 milliGRAM(s) SubCutaneous every 12 hours  fat emulsion (Fish Oil and Plant Based) 20% Infusion 0.7764 Gm/kG/Day IV Continuous <Continuous>  glucagon  Injectable 1 milliGRAM(s) IntraMuscular once  influenza  Vaccine (HIGH DOSE) 0.7 milliLiter(s) IntraMuscular once  insulin lispro (ADMELOG) corrective regimen sliding scale   SubCutaneous every 6 hours  loperamide 4 milliGRAM(s) Oral every 8 hours  melatonin 3 milliGRAM(s) Oral at bedtime PRN  metoprolol succinate ER 25 milliGRAM(s) Oral daily  mirtazapine 30 milliGRAM(s) Oral at bedtime  multivitamin 1 Tablet(s) Oral daily  ondansetron Injectable 4 milliGRAM(s) IV Push every 6 hours PRN  opium Tincture 6 milliGRAM(s) Oral every 12 hours  pantoprazole    Tablet 40 milliGRAM(s) Oral two times a day  Parenteral Nutrition - Adult 1 Each TPN Continuous <Continuous>  Parenteral Nutrition - Adult 1 Each TPN Continuous <Continuous>  piperacillin/tazobactam IVPB.- 3.375 Gram(s) IV Intermittent once  piperacillin/tazobactam IVPB.. 3.375 Gram(s) IV Intermittent every 8 hours  psyllium Powder 1 Packet(s) Oral every 8 hours  sacubitril 24 mG/valsartan 26 mG 1 Tablet(s) Oral two times a day  silver sulfADIAZINE 1% Cream 1 Application(s) Topical daily  sodium chloride 0.9% lock flush 10 milliLiter(s) IV Push every 1 hour PRN  spironolactone 25 milliGRAM(s) Oral daily  zinc sulfate 220 milliGRAM(s) Oral daily      Allergies:No Known Allergies        Labs:                           10.1   17.41 )-----------( 455      ( 27 Jan 2023 05:42 )             31.0       01-27    131<L>  |  100  |  22  ----------------------------<  127<H>  3.6   |  20<L>  |  0.58    Ca    8.5      27 Jan 2023 05:42  Phos  2.8     01-27  Mg     2.0     01-27            Imaging Findings: decrease in size of the RIGHT hemiabdomen collection likely hematoma.

## 2023-01-27 NOTE — BH CONSULTATION LIAISON PROGRESS NOTE - NSBHFUPINTERVALHXFT_PSY_A_CORE
Pt maintained on 1:1 observation overnight after voicing SI to primary team. Assessed without any active SI on psychiatry evaluation yesterday evening. No acute events overnight, no interim report of SI.    On interview this morning, pt found resting with eyes closed in bed, awake on approach, irritable about assessment. Pt reports that he is feeling despondent about his future and just wants his colostomy removed so he can "go back to normal life". Pt states that he told staff that he doesn't "want to live like this" yesterday but denies that he was experiencing any active thoughts of ending his life. States that he has no "means" and no plan. States that his sleep continues to be poor due to pain. Agreeable to continue to meet with psychology staff.

## 2023-01-27 NOTE — PROGRESS NOTE ADULT - SUBJECTIVE AND OBJECTIVE BOX
Infectious Diseases Progress Note:    SUBJECTIVE: Patient seen and examined at bedside. Patient denies fever, chills, HA, nausea, vomiting, abdominal pain, dysuria, diarrhea.    AFIB      Pulmonary thromboembolism    Atrial flutter    Acute systolic congestive heart failure    Abdominal pain    Encounter for palliative care    Therapeutic opioid induced constipation    Difficulty coping with disease    Heart failure    Advance care planning    Debility    Acute mesenteric ischemia    Septic shock    Limb ischemia    Poor appetite    Hemorrhagic shock    Acute systolic congestive heart failure    Atrial flutter    Mesenteric ischemia    GI bleed    Lower limb ischemia    Severe protein-calorie malnutrition        Allergies    No Known Allergies    Intolerances        ANTIBIOTICS/RELEVANT:  antimicrobials  piperacillin/tazobactam IVPB. 3.375 Gram(s) IV Intermittent once  piperacillin/tazobactam IVPB.- 3.375 Gram(s) IV Intermittent once  piperacillin/tazobactam IVPB.. 3.375 Gram(s) IV Intermittent every 8 hours    immunologic:  influenza  Vaccine (HIGH DOSE) 0.7 milliLiter(s) IntraMuscular once      OTHER:  acetaminophen     Tablet .. 650 milliGRAM(s) Oral every 6 hours PRN  aMIOdarone    Tablet 100 milliGRAM(s) Oral every 24 hours  ascorbic acid 500 milliGRAM(s) Oral daily  chlorhexidine 2% Cloths 1 Application(s) Topical <User Schedule>  chlorhexidine 2% Cloths 1 Application(s) Topical <User Schedule>  dextrose 5%. 1000 milliLiter(s) IV Continuous <Continuous>  dextrose 5%. 1000 milliLiter(s) IV Continuous <Continuous>  dextrose Oral Gel 15 Gram(s) Oral once PRN  diphenoxylate/atropine 2 Tablet(s) Oral every 8 hours  enoxaparin Injectable 60 milliGRAM(s) SubCutaneous every 12 hours  fat emulsion (Fish Oil and Plant Based) 20% Infusion 0.7764 Gm/kG/Day IV Continuous <Continuous>  glucagon  Injectable 1 milliGRAM(s) IntraMuscular once  insulin lispro (ADMELOG) corrective regimen sliding scale   SubCutaneous every 6 hours  loperamide 4 milliGRAM(s) Oral every 8 hours  melatonin 3 milliGRAM(s) Oral at bedtime PRN  metoprolol succinate ER 25 milliGRAM(s) Oral daily  mirtazapine 30 milliGRAM(s) Oral at bedtime  multivitamin 1 Tablet(s) Oral daily  ondansetron Injectable 4 milliGRAM(s) IV Push every 6 hours PRN  opium Tincture 6 milliGRAM(s) Oral every 12 hours  pantoprazole    Tablet 40 milliGRAM(s) Oral two times a day  Parenteral Nutrition - Adult 1 Each TPN Continuous <Continuous>  psyllium Powder 1 Packet(s) Oral every 8 hours  sacubitril 24 mG/valsartan 26 mG 1 Tablet(s) Oral two times a day  silver sulfADIAZINE 1% Cream 1 Application(s) Topical daily  sodium chloride 0.9% lock flush 10 milliLiter(s) IV Push every 1 hour PRN  spironolactone 25 milliGRAM(s) Oral daily  zinc sulfate 220 milliGRAM(s) Oral daily      Objective:  Vital Signs Last 24 Hrs  T(C): 36.8 (2023 09:41), Max: 37.2 (2023 18:05)  T(F): 98.2 (2023 09:41), Max: 98.9 (2023 18:05)  HR: 108 (2023 09:00) (94 - 126)  BP: 123/62 (2023 09:00) (64/42 - 165/92)  BP(mean): 83 (2023 09:00) (49 - 116)  RR: 14 (2023 09:00) (14 - 18)  SpO2: 98% (2023 09:00) (95% - 100%)    Parameters below as of 2023 09:00  Patient On (Oxygen Delivery Method): room air        PHYSICAL EXAM:  Constitutional: Well-developed, well nourished  Eyes: PERRLA, EOMI  Ear/Nose/Throat: no oral lesion, no sinus tenderness on percussion	  Neck:no JVD, no lymphadenopathy, supple  Respiratory: CTA bilateral  Cardiovascular: S1S2, RRR, no murmurs  Gastrointestinal: soft, NTND, (+) BS, no HSM  Extremities: no c/e/e  Skin: no rashes    LABS:                        10.1   17.41 )-----------( 455      ( 2023 05:42 )             31.0     -27    131<L>  |  100  |  22  ----------------------------<  127<H>  3.6   |  20<L>  |  0.58    Ca    8.5      2023 05:42  Phos  2.8       Mg     2.0             Urinalysis Basic - ( 2023 20:03 )    Color: Yellow / Appearance: SL Cloudy / S.025 / pH: x  Gluc: x / Ketone: NEGATIVE  / Bili: Negative / Urobili: 0.2 E.U./dL   Blood: x / Protein: Trace mg/dL / Nitrite: NEGATIVE   Leuk Esterase: NEGATIVE / RBC: < 5 /HPF / WBC < 5 /HPF   Sq Epi: x / Non Sq Epi: x / Bacteria: Present /HPF        MICROBIOLOGY:            RADIOLOGY & ADDITIONAL STUDIES: Infectious Diseases Progress Note:    SUBJECTIVE: Patient seen and examined at bedside. Pt states that he believes he is developing a cold, as he has rhinorrhea today. Patient denies fever, chills, HA, nausea, vomiting, abdominal pain, dysuria, diarrhea.      Allergies    No Known Allergies    Intolerances        ANTIBIOTICS/RELEVANT:  antimicrobials  piperacillin/tazobactam IVPB. 3.375 Gram(s) IV Intermittent once  piperacillin/tazobactam IVPB.- 3.375 Gram(s) IV Intermittent once  piperacillin/tazobactam IVPB.. 3.375 Gram(s) IV Intermittent every 8 hours    immunologic:  influenza  Vaccine (HIGH DOSE) 0.7 milliLiter(s) IntraMuscular once      OTHER:  acetaminophen     Tablet .. 650 milliGRAM(s) Oral every 6 hours PRN  aMIOdarone    Tablet 100 milliGRAM(s) Oral every 24 hours  ascorbic acid 500 milliGRAM(s) Oral daily  chlorhexidine 2% Cloths 1 Application(s) Topical <User Schedule>  chlorhexidine 2% Cloths 1 Application(s) Topical <User Schedule>  dextrose 5%. 1000 milliLiter(s) IV Continuous <Continuous>  dextrose 5%. 1000 milliLiter(s) IV Continuous <Continuous>  dextrose Oral Gel 15 Gram(s) Oral once PRN  diphenoxylate/atropine 2 Tablet(s) Oral every 8 hours  enoxaparin Injectable 60 milliGRAM(s) SubCutaneous every 12 hours  fat emulsion (Fish Oil and Plant Based) 20% Infusion 0.7764 Gm/kG/Day IV Continuous <Continuous>  glucagon  Injectable 1 milliGRAM(s) IntraMuscular once  insulin lispro (ADMELOG) corrective regimen sliding scale   SubCutaneous every 6 hours  loperamide 4 milliGRAM(s) Oral every 8 hours  melatonin 3 milliGRAM(s) Oral at bedtime PRN  metoprolol succinate ER 25 milliGRAM(s) Oral daily  mirtazapine 30 milliGRAM(s) Oral at bedtime  multivitamin 1 Tablet(s) Oral daily  ondansetron Injectable 4 milliGRAM(s) IV Push every 6 hours PRN  opium Tincture 6 milliGRAM(s) Oral every 12 hours  pantoprazole    Tablet 40 milliGRAM(s) Oral two times a day  Parenteral Nutrition - Adult 1 Each TPN Continuous <Continuous>  psyllium Powder 1 Packet(s) Oral every 8 hours  sacubitril 24 mG/valsartan 26 mG 1 Tablet(s) Oral two times a day  silver sulfADIAZINE 1% Cream 1 Application(s) Topical daily  sodium chloride 0.9% lock flush 10 milliLiter(s) IV Push every 1 hour PRN  spironolactone 25 milliGRAM(s) Oral daily  zinc sulfate 220 milliGRAM(s) Oral daily      Objective:  Vital Signs Last 24 Hrs  T(C): 36.8 (2023 09:41), Max: 37.2 (2023 18:05)  T(F): 98.2 (2023 09:41), Max: 98.9 (2023 18:05)  HR: 108 (2023 09:00) (94 - 126)  BP: 123/62 (2023 09:00) (64/42 - 165/92)  BP(mean): 83 (2023 09:00) (49 - 116)  RR: 14 (2023 09:00) (14 - 18)  SpO2: 98% (2023 09:00) (95% - 100%)    Parameters below as of 2023 09:00  Patient On (Oxygen Delivery Method): room air        PHYSICAL EXAM:  Constitutional: NAD, cachectic male  Eyes: PERRLA, EOMI  Ear/Nose/Throat: no oral lesion, no sinus tenderness on percussion	  Neck:  JVD, no lymphadenopathy, supple  Respiratory: CTA bilateral  Cardiovascular: tachycardic, S1S2, RRR, no murmurs  Gastrointestinal: scaphoid, soft, ileostomy tube in place, NTND  Extremities: LLE dry gangrene with demarcation below knee  Skin: no rashes    LABS:                        10.1   17.41 )-----------( 455      ( 2023 05:42 )             31.0         131<L>  |  100  |  22  ----------------------------<  127<H>  3.6   |  20<L>  |  0.58    Ca    8.5      2023 05:42  Phos  2.8       Mg     2.0             Urinalysis Basic - ( 2023 20:03 )    Color: Yellow / Appearance: SL Cloudy / S.025 / pH: x  Gluc: x / Ketone: NEGATIVE  / Bili: Negative / Urobili: 0.2 E.U./dL   Blood: x / Protein: Trace mg/dL / Nitrite: NEGATIVE   Leuk Esterase: NEGATIVE / RBC: < 5 /HPF / WBC < 5 /HPF   Sq Epi: x / Non Sq Epi: x / Bacteria: Present /HPF        MICROBIOLOGY:            RADIOLOGY & ADDITIONAL STUDIES:

## 2023-01-27 NOTE — PROGRESS NOTE ADULT - SUBJECTIVE AND OBJECTIVE BOX
SUBJECTIVE: Pt seen and examined by chief resident. Pt is doing well, resting comfortably on bed. Pain controlled. Agreeing to have PICC placed today.     Vital Signs Last 24 Hrs  T(C): 36.8 (2023 09:41), Max: 37.2 (2023 18:05)  T(F): 98.2 (2023 09:41), Max: 98.9 (2023 18:05)  HR: 108 (2023 09:00) (94 - 126)  BP: 123/62 (2023 09:00) (64/42 - 165/92)  BP(mean): 83 (2023 09:00) (49 - 116)  RR: 14 (2023 09:00) (14 - 18)  SpO2: 98% (2023 09:00) (95% - 100%)    Parameters below as of 2023 09:00  Patient On (Oxygen Delivery Method): room air        I&O's Summary    2023 07:01  -  2023 07:00  --------------------------------------------------------  IN: 2808 mL / OUT: 4050 mL / NET: -1242 mL        Physical Exam:  General Appearance: Appears well, NAD  Pulmonary: Nonlabored breathing, no respiratory distress  Abdomen: Soft, nondisteded, nontender, ostomy with output  Extremities: WWP, SCD's in place     LABS:                        10.1   17.41 )-----------( 455      ( 2023 05:42 )             31.0     0127    131<L>  |  100  |  22  ----------------------------<  127<H>  3.6   |  20<L>  |  0.58    Ca    8.5      2023 05:42  Phos  2.8       Mg     2.0     27        Urinalysis Basic - ( 2023 20:03 )    Color: Yellow / Appearance: SL Cloudy / S.025 / pH: x  Gluc: x / Ketone: NEGATIVE  / Bili: Negative / Urobili: 0.2 E.U./dL   Blood: x / Protein: Trace mg/dL / Nitrite: NEGATIVE   Leuk Esterase: NEGATIVE / RBC: < 5 /HPF / WBC < 5 /HPF   Sq Epi: x / Non Sq Epi: x / Bacteria: Present /HPF

## 2023-01-27 NOTE — PROGRESS NOTE ADULT - ASSESSMENT
77y y/o Male with significant PMHx of IVDU found unresponsive at his nursing home, resolved after Narcan in the field, and brought to [facility]. Found to have PE, atrial flutter, and large LV thrombus on echo. Transferred to [facility] for further management. Pt C/o abdominal pain on 12/10 CTA showing mid SMA with embolus. Abnormal distal small bowel loops and cecum with dilatation and pneumatosis suggesting infarcted bowel. One or two tiny foci of  intrahepatic portal vein pneumatosis. Segmental infarction upper pole left kidney. Now s/p Ex lap, SMA embolectomy, 80cm SBR, abthera vac left in discontinuity (12/11) and transferred to SICU intubated. S/p second look (12/13) and most recently s/p OR for 3rd look, end-to-end anastomosis of remaining bowel, loop ileostomy and abdomen closure (12/15).  LLE Ischemia has not yet fully demarcated, it is clear that the posterior calf (which is needed to heal a BKA flap) is involved and therefore only surgical option available to the patient is an AKA pending medical optimization. LLE gangrene dry at this time.     Recommendations:    - L LE unchanged from prior examinations, dry gangrene extending from toes to just below the knee, no wet gangrene observed; even though it can't be ruled out without proper imaging, low suspicion for osteomyelitis  - If clinically feasible from primary team perspective, patient okay to be discharged, no vascular surgery intervention precluding discharge  - Patient can represent at another time when nutritional status is optimized for elective amputation  - Continue daily local wound care with betadine to all skin below the line of demarcation of his left shin and Kerlix. No offloading boot, keep left heel elevated off bed.   - Continue supportive measures  - Rest of care per primary team   - Vascular surgery Team 3C will continue to follow. Please call [phone] with any questions or concerns

## 2023-01-27 NOTE — BH CONSULTATION LIAISON PROGRESS NOTE - NSBHASSESSMENTFT_PSY_ALL_CORE
77 year old male with history of substance use and no significant past medical history (poor medical follow up, last PCP visit 20 years prior to admission), presenting with unresponsiveness from nursing home to [facility], found to be in Aflutter w RVR with subsegmental PE RUL, transferred to [facility], found to have LV thrombus and worsening HF, EF 10-15%. 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 Course further complicated by LLE pulselessness and ulcerations that will ultimately require BKA. Psychiatry consulted due to depressed mood and suicidality, with re-evaluation requested yesterday for suicidality, no active SI identified on exam.     On serial exams, pt presents with persistent depression, no subjective improvement with titration of mirtazapine though tolerating well. Pt continues to endorse hopelessness, frustration, and intermittent passive SI often exacerbated by periods of heightened physical distress, yesterday possibly exacerbated by rapid response with hypotension. Thoughts about death without any active SI present on exam today, never any persisting suicidal intent or any plan. Continues with low energy, difficulty initiating sleep at night though observed with daytime sleep. No evidence of tyler, internal response to stimuli, or other psychotic phenomena. At the present time the patient does not appear to be at imminent risk of harm to self, is forward thinking despite demoralization about his health, with additional protective factors including his family, fear of death, motivation to get well. No aggressive ideation/behavior noted or reported.     :::Recommendations:::  -No need for 1:1 observation for suicidality. Consider supervised room per nursing protocol.  - Recommend continuing mirtazapine to 30mg QHS for ongoing depressive sx and insomnia, will observe for response  -Encourage sleep hygiene and limiting sleep during the day  -delirium precautions  - When medically possible, group medical visits/interventions together such that the patient has extended periods of uninterrupted sleep.   -pain management as per primary team  - The patient greatly enjoys playing chess; a chess board is available through [employer] ([employer]); the patient is aware that he can request the board be brought.  -Psychology will follow up for individual psychotherapy  -Contact CL with any questions/concerns  - No psychiatric contraindications to discharge

## 2023-01-27 NOTE — PROGRESS NOTE ADULT - SUBJECTIVE AND OBJECTIVE BOX
Patient was seen and examined at bedside. Case discuss with resident. Pt reports that he did not sleep well last night.     OBJECTIVE:  Vital Signs Last 24 Hrs  T(C): 36.8 (27 Jan 2023 09:41), Max: 37.2 (26 Jan 2023 18:05)  T(F): 98.2 (27 Jan 2023 09:41), Max: 98.9 (26 Jan 2023 18:05)  HR: 108 (27 Jan 2023 09:00) (94 - 126)  BP: 123/62 (27 Jan 2023 09:00) (64/42 - 165/92)  BP(mean): 83 (27 Jan 2023 09:00) (49 - 116)  RR: 14 (27 Jan 2023 09:00) (14 - 18)  SpO2: 98% (27 Jan 2023 09:00) (95% - 100%)    Parameters below as of 27 Jan 2023 09:00  Patient On (Oxygen Delivery Method): room air      PHYSICAL EXAM:  Gen: NAD laying in bed; Pt on 1: 1   CV: RRR, +S1/S2, no mumur  Pulm: CTA b/l no wheezing or crackles   Abd: soft, ostomy in place  b/l LE dressing C/D/I       LABS:                        10.1   17.41 )-----------( 455      ( 27 Jan 2023 05:42 )             31.0     01-27    131<L>  |  100  |  22  ----------------------------<  127<H>  3.6   |  20<L>  |  0.58    Ca    8.5      27 Jan 2023 05:42  Phos  2.8     01-27  Mg     2.0     01-27    CAPILLARY BLOOD GLUCOSE  POCT Blood Glucose.: 98 mg/dL (26 Jan 2023 13:19)    1-26 CT A/P:   1. Since  January 15, 2023, selective decreased right lower quadrant   fluid collection.  2. No intra-abdominal contrast leakage from ileostomy.  3. Persistent cecal mural edema and mucosal hyperenhancement, probably   inflammatory or infectious.  3. Unchanged multifocal narrowing with areas of near occlusion distal SMA   and diminutive terminal SMA.        acetaminophen     Tablet .. 650 milliGRAM(s) Oral every 6 hours PRN  aMIOdarone    Tablet 100 milliGRAM(s) Oral every 24 hours  ascorbic acid 500 milliGRAM(s) Oral daily  chlorhexidine 2% Cloths 1 Application(s) Topical <User Schedule>  chlorhexidine 2% Cloths 1 Application(s) Topical <User Schedule>  dextrose 5%. 1000 milliLiter(s) IV Continuous <Continuous>  dextrose 5%. 1000 milliLiter(s) IV Continuous <Continuous>  dextrose Oral Gel 15 Gram(s) Oral once PRN  diphenoxylate/atropine 2 Tablet(s) Oral every 8 hours  enoxaparin Injectable 60 milliGRAM(s) SubCutaneous every 12 hours  fat emulsion (Fish Oil and Plant Based) 20% Infusion 0.7764 Gm/kG/Day IV Continuous <Continuous>  glucagon  Injectable 1 milliGRAM(s) IntraMuscular once  influenza  Vaccine (HIGH DOSE) 0.7 milliLiter(s) IntraMuscular once  insulin lispro (ADMELOG) corrective regimen sliding scale   SubCutaneous every 6 hours  loperamide 4 milliGRAM(s) Oral every 8 hours  melatonin 3 milliGRAM(s) Oral at bedtime PRN  metoprolol succinate ER 25 milliGRAM(s) Oral daily  mirtazapine 30 milliGRAM(s) Oral at bedtime  multivitamin 1 Tablet(s) Oral daily  ondansetron Injectable 4 milliGRAM(s) IV Push every 6 hours PRN  opium Tincture 6 milliGRAM(s) Oral every 12 hours  pantoprazole    Tablet 40 milliGRAM(s) Oral two times a day  Parenteral Nutrition - Adult 1 Each TPN Continuous <Continuous>  piperacillin/tazobactam IVPB. 3.375 Gram(s) IV Intermittent once  piperacillin/tazobactam IVPB.- 3.375 Gram(s) IV Intermittent once  piperacillin/tazobactam IVPB.. 3.375 Gram(s) IV Intermittent every 8 hours  psyllium Powder 1 Packet(s) Oral every 8 hours  sacubitril 24 mG/valsartan 26 mG 1 Tablet(s) Oral two times a day  silver sulfADIAZINE 1% Cream 1 Application(s) Topical daily  sodium chloride 0.9% lock flush 10 milliLiter(s) IV Push every 1 hour PRN  spironolactone 25 milliGRAM(s) Oral daily  zinc sulfate 220 milliGRAM(s) Oral daily

## 2023-01-27 NOTE — CONSULT NOTE ADULT - ASSESSMENT
Assessment: 77M with PMHx of IVDU found unresponsive at his nursing home, resolved after Narcan in the field and brought to [facility]. Found to have PE, atrial flutter, and large LV thrombus on echo. Transferred to [facility] for further management. CT showing abdominal hematoma. IR consulted for RLQ abdominal collection drainage. Case reviewed with Dr. Zambrano, collection is not amenable to percutaneous drainage d/t bowel obstructing safe window.      Communicated with: Shu surgery PA

## 2023-01-28 NOTE — PROGRESS NOTE ADULT - SUBJECTIVE AND OBJECTIVE BOX
STATUS POST:  12/11: Ex lap, SMA embolectomy, 80cm SBR, abthera vac  12/13: Second look, SBR  12/15: Ex-lap, no dead gut, DANIEL of discontinuous gut, loop ileostomy; EBL minimal, 1L crystalloid, UOP 150mL       SUBJECTIVE: Pt seen and examined at bedside this am by surgery team. 1200 LR bolus this AM for 2500 ostomy output. Otherwise feels well, unchanged.    Denies f/n/v/cp/sob.    Vital Signs Last 24 Hrs  T(C): 37.8 (28 Jan 2023 04:59), Max: 38.1 (27 Jan 2023 13:13)  T(F): 100 (28 Jan 2023 04:59), Max: 100.6 (27 Jan 2023 22:07)  HR: 110 (28 Jan 2023 03:45) (108 - 124)  BP: 105/62 (28 Jan 2023 03:45) (95/57 - 123/62)  BP(mean): 72 (28 Jan 2023 03:45) (71 - 83)  RR: 16 (28 Jan 2023 03:45) (14 - 16)  SpO2: 99% (28 Jan 2023 03:45) (98% - 100%)    Parameters below as of 28 Jan 2023 03:45  Patient On (Oxygen Delivery Method): room air        PHYSICAL EXAM:   Gen: Awake, alert, NAD, resting comfortably   CV: RRR  Pulm: no respiratory distress on RA  Abd: soft, ND, NTTP, ostomy with output   Ext: WWP,SCDs in place     I&O's Detail    27 Jan 2023 07:01  -  28 Jan 2023 07:00  --------------------------------------------------------  IN:    dextrose 5% + sodium chloride 0.45%: 660 mL    Fat Emulsion (Fish Oil &amp; Plant Based) 20% Infusion: 83.2 mL    Lactated Ringers Bolus: 600 mL    PPN (Peripheral Parenteral Nutrition): 830 mL  Total IN: 2173.2 mL    OUT:    Ileostomy (mL): 2500 mL    Voided (mL): 800 mL  Total OUT: 3300 mL    Total NET: -1126.8 mL          LABS:                        9.6    15.75 )-----------( 459      ( 28 Jan 2023 05:30 )             31.0     01-28    131<L>  |  99  |  25<H>  ----------------------------<  103<H>  4.2   |  19<L>  |  0.73    Ca    8.5      28 Jan 2023 05:30  Phos  2.3     01-28  Mg     2.3     01-28          CAPILLARY BLOOD GLUCOSE      POCT Blood Glucose.: 140 mg/dL (27 Jan 2023 11:47)      RADIOLOGY & ADDITIONAL STUDIES:

## 2023-01-28 NOTE — PROGRESS NOTE ADULT - ASSESSMENT
77M first presented to [facility] from [facility] due to unresponsiveness (responded to Narcan). At [facility], found to have subsegmental pulmonary embolism in RUL, rapid atrial flutter with severely reduced LVEF with LV thrombus. Transferred to [facility] on 12/7/22 for LORENZO and possible ablation. At [facility], was found to have left leg ischemia (left leg arterial thrombus on LE duplex on 12/8). Was being considered for rhythm control when he developed abdominal pain, CTA (12/10/22) showed embolus in SMA, bowel infarct, requiring ex-lap on 12/11 with SMA embolectomy, 80cm small bowel resection; On 12/13, underwent 2nd look laparotomy, with 80cm small bowel resection, closure with abthera. On 12/15, underwent 3rd look laparotomy, end-to-end anastomosis of remaining bowel, loop ileostomy and abdominal closure. Now extubated, on tele floor. Eventually underwent LORENZO/DCCV on 12/30/22, now in sinus rhythm. Currently being evaluated for possible above knee amputation for LLE ischemia (possibly deferring till next hospitalization, after optimization of nutritional status).      #Fever  #Leukocytosis   -Pt with CT abd/pelvis which showed fluid collection. Pt was seen by IR re: fluid collection however the pt's imaging was reviewed by IR and it was determined that the fluid collection is not amenable to percutaneous drainage d/t bowel obstructing a safe window.  - COntinue to monitor WBC 17->15   - Will f/u  blood cx from 1/25 NTD x 2 days and 1/27 Blood cx: NTD x12hrs    -ID follow up appreciated and they recommended to continue Zosyn     # Left leg ischemia   - planning per vascular surgery and primary team  - will need to optimize nutritional status prior to operation  - per cards, will need 30 days of anticoagulation (full dose) - 11AM on 1/29 will be 30 days  - if patient remains in the hospital - ?amputation with vascular     #Suicidal Ideation  #Insomnia   - Psych follow up appreciated and per psych there is no need for 1:1 observation for suicidality. Consider supervised room per nursing protocol.  - Continuing mirtazapine to 30mg QHS for ongoing depressive sx and insomnia    #Elevated liver tests  - continue to trend     # Acute Systolic Heart Failure   - Metoprolol succinate 25mg qd     # Atrial Flutter  - s/p DCCV 12/30  - EP recommending uninterrupted AC x 30 days ; * 11AM January 29th would be 30 days post DCCV.   - on Lovenox, amiodarone and metoprolol succinate    # Pulmonary embolism (subsegmental RUL)   - After completion of AC for DCCV, would need to continue AC for PE - continuing lovenox at this time    # Bowel ischemia - s/p SMA embolectomy; Small bowel resection  - ileostomy  - plan per primary team  - continue loperamide and diphenoxylate atropine    # Severe protein-calorie malnutrition   - needs nutritional optimization      # Sacral wound  - nursing wound care per nursing protocol     #Hyponatremia   - Likely hypovolumic hyponatremia, due to poor/lack of PO intake and loose stools from ostomy  - improved Pt's NA is 131.  Difficult to give IV fluids, particularly with HFrEF  -Continue  daily BMP      #DVT ppx  - As per primary team; Pt is on Lovenox     #Diet: regular + TPN  #Dispo: TBD

## 2023-01-28 NOTE — PROGRESS NOTE ADULT - SUBJECTIVE AND OBJECTIVE BOX
INTERVAL HPI/OVERNIGHT EVENTS:  Coverage for Dr. Stewart.  Tm 100.6.  Is upset by number of devices to which he is connected.  Has no pain.  Does not feel fevers.  No pain LLE    CONSTITUTIONAL:  No fever, chills, night sweats  EYES:  No photophobia or visual changes  CARDIOVASCULAR:  No chest pain  RESPIRATORY:  No cough, wheezing, or SOB   GASTROINTESTINAL:  No nausea, vomiting, diarrhea, constipation, or abdominal pain  GENITOURINARY:  No frequency, urgency, dysuria or hematuria  NEUROLOGIC:  No headache, lightheadedness      ANTIBIOTICS/RELEVANT:          Vital Signs Last 24 Hrs  T(C): 37.9 (28 Jan 2023 17:57), Max: 38.1 (27 Jan 2023 22:07)  T(F): 100.3 (28 Jan 2023 17:57), Max: 100.6 (27 Jan 2023 22:07)  HR: 106 (28 Jan 2023 16:22) (104 - 124)  BP: 107/58 (28 Jan 2023 16:22) (97/53 - 120/63)  BP(mean): 74 (28 Jan 2023 16:22) (69 - 82)  RR: 12 (28 Jan 2023 16:22) (12 - 16)  SpO2: 98% (28 Jan 2023 16:22) (98% - 100%)    Parameters below as of 28 Jan 2023 16:22  Patient On (Oxygen Delivery Method): room air        PHYSICAL EXAM:  Constitutional:  Cachectic  Eyes:  Sclerae anicteric, conjunctivae clear, PERRL  Ear/Nose/Throat:  No nasal exudate or sinus tenderness;  No buccal mucosal lesions, no pharyngeal erythema or exudate	  Neck:  Supple, no adenopathy  Respiratory:  Clear bilaterally  Cardiovascular:  RRR, S1S2, no murmur appreciated  Gastrointestinal:  Ostomy with copious orange output, no tenderness to light palpation  Extremities:  LLE gangrene      LABS:                        9.6    15.75 )-----------( 459      ( 28 Jan 2023 05:30 )             31.0         01-28    131<L>  |  99  |  25<H>  ----------------------------<  103<H>  4.2   |  19<L>  |  0.73    Ca    8.5      28 Jan 2023 05:30  Phos  2.3     01-28  Mg     2.3     01-28            MICROBIOLOGY:        RADIOLOGY & ADDITIONAL STUDIES:

## 2023-01-28 NOTE — PROGRESS NOTE ADULT - ASSESSMENT
77M with PMHx of IVDU found unresponsive at his nursing home, resolved after Narcan in the field and brought to [facility]. Found to have PE, atrial flutter, and large LV thrombus on echo. Transferred to [facility] for further management. Pt c/o abdominal pain on 12/10 CTA showing mid SMA with embolus. Abnormal distal small bowel loops and cecum with dilatation and pneumatosis suggesting infarcted bowel. One or two tiny foci of intrahepatic portal vein pneumatosis. Segmental infarction upper pole left kidney. Now s/p Ex lap, SMA embolectomy, 80cm SBR, abthera vac left in discontinuity (12/11) and transferred to SICU intubated. S/p second look (12/13) and most recently s/p OR for 3rd look, end-to-end anastomosis of remaining bowel, loop ileostomy and abdomen closure (12/15). LLE ischemia, AKA delayed by nutritional optimization.    Regular diet w/ ensures  PPN, likely PICC placement 1/26  f/u blood cxs   Pain/nausea control  A flutter now s/p cardioversion, amio 100 QD, toprol XL 25 QD  LV thrombus w LLE limb ischemia  Lovenox  OOBA/IS  AKA likely outpatient  AM labs  Dispo: [facility] pending auth and placement

## 2023-01-28 NOTE — PROGRESS NOTE ADULT - ATTENDING COMMENTS
Acute mesenteric ischemia - s/p ex-lap, SMA embolectomy, SBR, anastomosis, diverting ostomy  High ostomy output - continues despite anti-motility agents, CT obtained to evaluate patency of anastomosis, no evidence of leak, will plan for reversal of ostomy when able  Malnutrition - continue diet, vargas counts, ensure TID, PPN  A Flutter - s/p cardioversion, anticoagulation course for cardioversion to complete 1/28, continue amio and toprol  LV thrombus - will require long term anticoagulation  LLE ischemia - AKA outpatient per vascular surgery  Fever and leukocytosis - unclear etiology BCx negative, Continue Zosyn for possible intraabdominal collection, not amenable to IR drainage

## 2023-01-28 NOTE — PROGRESS NOTE ADULT - ASSESSMENT
78 yo male with prolonged (51 day) stay initially presented with mesenteric and LE ischemia—underwent SBR 12/2022; ID was (re) consulted for intermittent fevers in setting of prior intra-abdominal hematoma. Ongoing low grade fevers (T102 at 2 pm in note from yesterday but not documented in flow sheet), ongoing leukocytosis.  CT A/P 1/26 with persistent RLQ abscess, not amenable to drainage by IR.  May require surgical intervention for source control given size, ongoing fever/leukocytosis.  Suggest:  - Continue to f/u blood cultures from 1/25 X 2 and 1/27 X 2  - Continue pip-tazo 3.375 g IV q8h via IE  Will follow with you – ID Team 1.  I will cover through 1/29, Dr. Stewart will resume care on 1/30.

## 2023-01-28 NOTE — PROGRESS NOTE ADULT - SUBJECTIVE AND OBJECTIVE BOX
Patient was seen and examined at bedside. Case discuss with resident. Pt reports that he did not sleep well. In addition the pt reports that he is feeling hungry. Pt with fever yesterday to 100.6.     OBJECTIVE:  Vital Signs Last 24 Hrs  T(C): 37.7 (28 Jan 2023 10:13), Max: 38.1 (27 Jan 2023 13:13)  T(F): 99.8 (28 Jan 2023 10:13), Max: 100.6 (27 Jan 2023 22:07)  HR: 110 (28 Jan 2023 08:30) (110 - 124)  BP: 114/57 (28 Jan 2023 08:30) (95/57 - 120/63)  BP(mean): 75 (28 Jan 2023 08:30) (71 - 82)  RR: 14 (28 Jan 2023 08:30) (14 - 16)  SpO2: 100% (28 Jan 2023 08:30) (98% - 100%)    PHYSICAL EXAM:  Gen: NAD laying in bed  CV: RRR, +S1/S2, no mumur  Pulm: CTA b/l no wheezing or crackles   Abd: soft, ostomy in place  b/l LE dressing C/D/I                             9.6    15.75 )-----------( 459      ( 28 Jan 2023 05:30 )             31.0     01-28    131<L>  |  99  |  25<H>  ----------------------------<  103<H>  4.2   |  19<L>  |  0.73    Ca    8.5      28 Jan 2023 05:30  Phos  2.3     01-28  Mg     2.3     01-28 1-25 Blood cx: NTD x 2 days   1-27 Blood cx: NTD x 12 hrs     POCT Blood Glucose.: 140 mg/dL (27 Jan 2023 11:47)    1-26 CT A/P:   1. Since  January 15, 2023, selective decreased right lower quadrant   fluid collection.  2. No intra-abdominal contrast leakage from ileostomy.  3. Persistent cecal mural edema and mucosal hyperenhancement, probably   inflammatory or infectious.  3. Unchanged multifocal narrowing with areas of near occlusion distal SMA   and diminutive terminal SMA.      acetaminophen     Tablet .. 650 milliGRAM(s) Oral every 6 hours PRN  aMIOdarone    Tablet 100 milliGRAM(s) Oral every 24 hours  ascorbic acid 500 milliGRAM(s) Oral daily  chlorhexidine 2% Cloths 1 Application(s) Topical <User Schedule>  chlorhexidine 2% Cloths 1 Application(s) Topical <User Schedule>  dextrose 5% + sodium chloride 0.45%. 1000 milliLiter(s) IV Continuous <Continuous>  dextrose 5%. 1000 milliLiter(s) IV Continuous <Continuous>  dextrose 5%. 1000 milliLiter(s) IV Continuous <Continuous>  dextrose Oral Gel 15 Gram(s) Oral once PRN  diphenoxylate/atropine 2 Tablet(s) Oral every 8 hours  enoxaparin Injectable 60 milliGRAM(s) SubCutaneous every 12 hours  glucagon  Injectable 1 milliGRAM(s) IntraMuscular once  influenza  Vaccine (HIGH DOSE) 0.7 milliLiter(s) IntraMuscular once  insulin lispro (ADMELOG) corrective regimen sliding scale   SubCutaneous every 6 hours  loperamide 4 milliGRAM(s) Oral every 8 hours  melatonin 3 milliGRAM(s) Oral at bedtime PRN  metoprolol succinate ER 25 milliGRAM(s) Oral daily  mirtazapine 30 milliGRAM(s) Oral at bedtime  multivitamin 1 Tablet(s) Oral daily  ondansetron Injectable 4 milliGRAM(s) IV Push every 6 hours PRN  opium Tincture 6 milliGRAM(s) Oral every 12 hours  oxyCODONE    IR 5 milliGRAM(s) Oral every 6 hours PRN  pantoprazole    Tablet 40 milliGRAM(s) Oral two times a day  piperacillin/tazobactam IVPB.. 3.375 Gram(s) IV Intermittent every 8 hours  psyllium Powder 1 Packet(s) Oral every 8 hours  sacubitril 24 mG/valsartan 26 mG 1 Tablet(s) Oral two times a day  silver sulfADIAZINE 1% Cream 1 Application(s) Topical daily  sodium chloride 0.9% lock flush 10 milliLiter(s) IV Push every 1 hour PRN  sodium phosphate 15 milliMole(s)/250 mL IVPB 15 milliMole(s) IV Intermittent once  spironolactone 25 milliGRAM(s) Oral daily  zinc sulfate 220 milliGRAM(s) Oral daily

## 2023-01-29 NOTE — PROGRESS NOTE ADULT - SUBJECTIVE AND OBJECTIVE BOX
INTERVAL HPI/OVERNIGHT EVENTS:  Overnight, ostomy output was repleted in 2:1 fashion. Patient seen and examined by chief resident and team on AM rounds. Patient resting comfortably in bed. No acute complaints.     STATUS POST:    12/11: Ex lap, SMA embolectomy, 80cm SBR, abthera vac  12/13: Second look, SBR  12/15: Ex-lap, no dead gut, DANIEL of discontinuous gut, loop ileostomy; EBL minimal, 1L crystalloid, UOP 150mL     SUBJECTIVE:    MEDICATIONS  (STANDING):  aMIOdarone    Tablet 100 milliGRAM(s) Oral every 24 hours  ascorbic acid 500 milliGRAM(s) Oral daily  chlorhexidine 2% Cloths 1 Application(s) Topical <User Schedule>  chlorhexidine 2% Cloths 1 Application(s) Topical <User Schedule>  dextrose 5% + sodium chloride 0.45%. 1000 milliLiter(s) (60 mL/Hr) IV Continuous <Continuous>  dextrose 5%. 1000 milliLiter(s) (50 mL/Hr) IV Continuous <Continuous>  dextrose 5%. 1000 milliLiter(s) (100 mL/Hr) IV Continuous <Continuous>  diphenoxylate/atropine 2 Tablet(s) Oral every 8 hours  enoxaparin Injectable 60 milliGRAM(s) SubCutaneous every 12 hours  fat emulsion (Fish Oil and Plant Based) 20% Infusion 0.774 Gm/kG/Day (20.83 mL/Hr) IV Continuous <Continuous>  glucagon  Injectable 1 milliGRAM(s) IntraMuscular once  influenza  Vaccine (HIGH DOSE) 0.7 milliLiter(s) IntraMuscular once  insulin lispro (ADMELOG) corrective regimen sliding scale   SubCutaneous every 6 hours  loperamide 4 milliGRAM(s) Oral every 8 hours  metoprolol succinate ER 25 milliGRAM(s) Oral daily  mirtazapine 30 milliGRAM(s) Oral at bedtime  multivitamin 1 Tablet(s) Oral daily  opium Tincture 6 milliGRAM(s) Oral every 12 hours  pantoprazole    Tablet 40 milliGRAM(s) Oral two times a day  Parenteral Nutrition - Adult 1 Each (83 mL/Hr) TPN Continuous <Continuous>  piperacillin/tazobactam IVPB.. 3.375 Gram(s) IV Intermittent every 8 hours  psyllium Powder 1 Packet(s) Oral every 8 hours  sacubitril 24 mG/valsartan 26 mG 1 Tablet(s) Oral two times a day  silver sulfADIAZINE 1% Cream 1 Application(s) Topical daily  spironolactone 25 milliGRAM(s) Oral daily  zinc sulfate 220 milliGRAM(s) Oral daily    MEDICATIONS  (PRN):  acetaminophen     Tablet .. 650 milliGRAM(s) Oral every 6 hours PRN Temp greater or equal to 38C (100.4F), Mild Pain (1 - 3)  dextrose Oral Gel 15 Gram(s) Oral once PRN Blood Glucose LESS THAN 70 milliGRAM(s)/deciliter  melatonin 3 milliGRAM(s) Oral at bedtime PRN Insomnia  ondansetron Injectable 4 milliGRAM(s) IV Push every 6 hours PRN Nausea and/or Vomiting  sodium chloride 0.9% lock flush 10 milliLiter(s) IV Push every 1 hour PRN Pre/post blood products, medications, blood draw, and to maintain line patency      Vital Signs Last 24 Hrs  T(C): 37.7 (29 Jan 2023 05:14), Max: 37.9 (28 Jan 2023 14:00)  T(F): 99.8 (29 Jan 2023 05:14), Max: 100.3 (28 Jan 2023 14:00)  HR: 116 (29 Jan 2023 03:45) (104 - 116)  BP: 130/55 (29 Jan 2023 03:45) (97/53 - 130/55)  BP(mean): 79 (29 Jan 2023 03:45) (69 - 84)  RR: 18 (29 Jan 2023 03:45) (12 - 18)  SpO2: 98% (29 Jan 2023 03:45) (95% - 100%)    Parameters below as of 29 Jan 2023 03:45  Patient On (Oxygen Delivery Method): room air    PHYSICAL EXAM:  PHYSICAL EXAM:   Gen: Awake, alert, NAD, resting comfortably   CV: RRR  Pulm: no respiratory distress on RA  Abd: soft, ND, NTTP, ostomy with output   Ext: WWP,SCDs in place       I&O's Detail    28 Jan 2023 07:01  -  29 Jan 2023 07:00  --------------------------------------------------------  IN:    dextrose 5% + sodium chloride 0.45%: 600 mL    Fat Emulsion (Fish Oil &amp; Plant Based) 20% Infusion: 208 mL    Lactated Ringers Bolus: 1200 mL    Lactated Ringers Bolus: 500 mL    Oral Fluid: 450 mL    PPN (Peripheral Parenteral Nutrition): 830 mL  Total IN: 3788 mL    OUT:    Ileostomy (mL): 1350 mL    Voided (mL): 1175 mL  Total OUT: 2525 mL    Total NET: 1263 mL          LABS:                        8.8    14.63 )-----------( 486      ( 29 Jan 2023 05:30 )             27.9     01-29    134<L>  |  103  |  15  ----------------------------<  138<H>  3.9   |  24  |  0.62    Ca    8.6      29 Jan 2023 05:30  Phos  2.9     01-29  Mg     1.8     01-29            RADIOLOGY & ADDITIONAL STUDIES:

## 2023-01-29 NOTE — PROGRESS NOTE ADULT - ASSESSMENT
77M first presented to [facility] from [facility] due to unresponsiveness (responded to Narcan). At [facility], found to have subsegmental pulmonary embolism in RUL, rapid atrial flutter with severely reduced LVEF with LV thrombus. Transferred to [facility] on 12/7/22 for LORENZO and possible ablation. At [facility], was found to have left leg ischemia (left leg arterial thrombus on LE duplex on 12/8). Was being considered for rhythm control when he developed abdominal pain, CTA (12/10/22) showed embolus in SMA, bowel infarct, requiring ex-lap on 12/11 with SMA embolectomy, 80cm small bowel resection; On 12/13, underwent 2nd look laparotomy, with 80cm small bowel resection, closure with abthera. On 12/15, underwent 3rd look laparotomy, end-to-end anastomosis of remaining bowel, loop ileostomy and abdominal closure. Now extubated, on tele floor. Eventually underwent LORENZO/DCCV on 12/30/22, now in sinus rhythm. Currently being evaluated for possible above knee amputation for LLE ischemia (possibly deferring till next hospitalization, after optimization of nutritional status).      #Fever  #Leukocytosis   -Pt with CT abd/pelvis which showed fluid collection. Pt was seen by IR re: fluid collection however the pt's imaging was reviewed by IR and it was determined that the fluid collection is not amenable to percutaneous drainage d/t bowel obstructing a safe window.  - Continue to monitor WBC  - Will f/u  blood cx from 1/25 NTD x 3 days and 1/27 Blood cx: NTD x1 days  -ID follow up appreciated and they recommended to continue Zosyn     # Left leg ischemia   - planning per vascular surgery and primary team  - Per vascular team the pt will need to optimize nutritional status prior to operation    #Suicidal Ideation  #Insomnia   - Pt was placed back on 1:1 observation for suicidality.   - Continuing mirtazapine to 30mg QHS for ongoing depressive sx and insomnia      # Acute Systolic Heart Failure   - Metoprolol succinate 25mg qd     # Atrial Flutter  - s/p DCCV 12/30  - EP recommending uninterrupted AC x 30 days ; January 29th was 30 days post DCCV.   -Continue Lovenox, amiodarone and metoprolol succinate    # Pulmonary embolism (subsegmental RUL)   - After completion of AC for DCCV, would need to continue AC for PE - continuing lovenox at this time    # Bowel ischemia - s/p SMA embolectomy; Small bowel resection  -Pt w/  ileostomy in place  - plan per primary team  - continue loperamide and diphenoxylate atropine    # Severe protein-calorie malnutrition  # Sacral wound   - needs nutritional optimization  - Wound care per nursing protocol     #Hyponatremia   - Likely hypovolumic hyponatremia, due to poor/lack of PO intake and loose stools from ostomy  -Continue daily BMP    #DVT ppx  - Continue Lovenox     #Diet  - Continue regular and  TPN    #Dispo  -As per primary team

## 2023-01-29 NOTE — PROGRESS NOTE ADULT - ASSESSMENT
77M with PMHx of IVDU found unresponsive at his nursing home, resolved after Narcan in the field and brought to [facility]. Found to have PE, atrial flutter, and large LV thrombus on echo. Transferred to [facility] for further management. Pt c/o abdominal pain on 12/10 CTA showing mid SMA with embolus. Abnormal distal small bowel loops and cecum with dilatation and pneumatosis suggesting infarcted bowel. One or two tiny foci of intrahepatic portal vein pneumatosis. Segmental infarction upper pole left kidney. Now s/p Ex lap, SMA embolectomy, 80cm SBR, abthera vac left in discontinuity (12/11) and transferred to SICU intubated. S/p second look (12/13) and most recently s/p OR for 3rd look, end-to-end anastomosis of remaining bowel, loop ileostomy and abdomen closure (12/15). LLE ischemia, AKA delayed by nutritional optimization.    Plan:  Regular diet w/ ensures  replete ostomy output 2:1 bid  1:1 observation for suicidal ideation  PPN  f/u blood cxs   Pain/nausea control  A flutter now s/p cardioversion, amio 100 QD, toprol XL 25 QD  LV thrombus w LLE limb ischemia  Lovenox  OOBA/IS  AKA likely outpatient  AM labs  Dispo: ROWAN pending auth and placement

## 2023-01-29 NOTE — PROGRESS NOTE ADULT - SUBJECTIVE AND OBJECTIVE BOX
Patient was seen and examined at bedside. Case discuss with resident. Pt reports that he is feeling tired this morning.     OBJECTIVE:  Vital Signs Last 24 Hrs  T(C): 38.2 (29 Jan 2023 09:00), Max: 38.2 (29 Jan 2023 09:00)  T(F): 100.7 (29 Jan 2023 09:00), Max: 100.7 (29 Jan 2023 09:00)  HR: 108 (29 Jan 2023 08:45) (104 - 116)  BP: 96/52 (29 Jan 2023 08:45) (96/52 - 130/55)  BP(mean): 65 (29 Jan 2023 08:45) (65 - 84)  RR: 18 (29 Jan 2023 08:45) (12 - 18)  SpO2: 98% (29 Jan 2023 08:45) (95% - 100%)    PHYSICAL EXAM:  Gen: NAD laying in bed; Pt was on 1: 1  CV: RRR, +S1/S2, no mumur  Pulm: CTA b/l no wheezing or crackles   Abd: soft, ostomy in place  b/l LE dressing C/D/I       LABS:                        8.8    14.63 )-----------( 486      ( 29 Jan 2023 05:30 )             27.9     01-29    134<L>  |  103  |  15  ----------------------------<  138<H>  3.9   |  24  |  0.62    Ca    8.6      29 Jan 2023 05:30  Phos  2.9     01-29  Mg     1.8     01-29    CAPILLARY BLOOD GLUCOSE  POCT Blood Glucose.: 108 mg/dL (29 Jan 2023 06:27)  POCT Blood Glucose.: 118 mg/dL (29 Jan 2023 00:20)  POCT Blood Glucose.: 126 mg/dL (28 Jan 2023 11:44)    1-25 Blood cx: NTD x 3 days   1-27 Blood cx: NTD x 1 day      acetaminophen     Tablet .. 650 milliGRAM(s) Oral every 6 hours PRN  aMIOdarone    Tablet 100 milliGRAM(s) Oral every 24 hours  ascorbic acid 500 milliGRAM(s) Oral daily  chlorhexidine 2% Cloths 1 Application(s) Topical <User Schedule>  chlorhexidine 2% Cloths 1 Application(s) Topical <User Schedule>  dextrose 5% + sodium chloride 0.45%. 1000 milliLiter(s) IV Continuous <Continuous>  dextrose 5%. 1000 milliLiter(s) IV Continuous <Continuous>  dextrose 5%. 1000 milliLiter(s) IV Continuous <Continuous>  dextrose Oral Gel 15 Gram(s) Oral once PRN  diphenoxylate/atropine 2 Tablet(s) Oral every 8 hours  enoxaparin Injectable 60 milliGRAM(s) SubCutaneous every 12 hours  fat emulsion (Fish Oil and Plant Based) 20% Infusion 0.774 Gm/kG/Day IV Continuous <Continuous>  fat emulsion (Fish Oil and Plant Based) 20% Infusion 0.774 Gm/kG/Day IV Continuous <Continuous>  glucagon  Injectable 1 milliGRAM(s) IntraMuscular once  influenza  Vaccine (HIGH DOSE) 0.7 milliLiter(s) IntraMuscular once  insulin lispro (ADMELOG) corrective regimen sliding scale   SubCutaneous every 6 hours  loperamide 4 milliGRAM(s) Oral every 8 hours  melatonin 3 milliGRAM(s) Oral at bedtime PRN  metoprolol succinate ER 25 milliGRAM(s) Oral daily  mirtazapine 30 milliGRAM(s) Oral at bedtime  multivitamin 1 Tablet(s) Oral daily  ondansetron Injectable 4 milliGRAM(s) IV Push every 6 hours PRN  opium Tincture 6 milliGRAM(s) Oral every 12 hours  pantoprazole    Tablet 40 milliGRAM(s) Oral two times a day  Parenteral Nutrition - Adult 1 Each TPN Continuous <Continuous>  Parenteral Nutrition - Adult 1 Each TPN Continuous <Continuous>  piperacillin/tazobactam IVPB.. 3.375 Gram(s) IV Intermittent every 8 hours  psyllium Powder 1 Packet(s) Oral every 8 hours  sacubitril 24 mG/valsartan 26 mG 1 Tablet(s) Oral two times a day  silver sulfADIAZINE 1% Cream 1 Application(s) Topical daily  sodium chloride 0.9% lock flush 10 milliLiter(s) IV Push every 1 hour PRN  spironolactone 25 milliGRAM(s) Oral daily  zinc sulfate 220 milliGRAM(s) Oral daily

## 2023-01-30 NOTE — PROGRESS NOTE ADULT - SUBJECTIVE AND OBJECTIVE BOX
Infectious Diseases Progress Note:    SUBJECTIVE: Patient seen and examined at bedside. Patient denies fever, chills, HA, nausea, vomiting, abdominal pain, dysuria, diarrhea.    AFIB      Pulmonary thromboembolism    Atrial flutter    Acute systolic congestive heart failure    Abdominal pain    Encounter for palliative care    Therapeutic opioid induced constipation    Difficulty coping with disease    Heart failure    Advance care planning    Debility    Acute mesenteric ischemia    Septic shock    Limb ischemia    Poor appetite    Hemorrhagic shock    Acute systolic congestive heart failure    Atrial flutter    Mesenteric ischemia    GI bleed    Lower limb ischemia    Severe protein-calorie malnutrition        Allergies    No Known Allergies    Intolerances        ANTIBIOTICS/RELEVANT:  antimicrobials  piperacillin/tazobactam IVPB.. 3.375 Gram(s) IV Intermittent every 8 hours    immunologic:  influenza  Vaccine (HIGH DOSE) 0.7 milliLiter(s) IntraMuscular once      OTHER:  acetaminophen     Tablet .. 650 milliGRAM(s) Oral every 6 hours PRN  aMIOdarone    Tablet 100 milliGRAM(s) Oral every 24 hours  ascorbic acid 500 milliGRAM(s) Oral daily  chlorhexidine 2% Cloths 1 Application(s) Topical <User Schedule>  chlorhexidine 2% Cloths 1 Application(s) Topical <User Schedule>  dextrose 5%. 1000 milliLiter(s) IV Continuous <Continuous>  dextrose 5%. 1000 milliLiter(s) IV Continuous <Continuous>  dextrose Oral Gel 15 Gram(s) Oral once PRN  diphenoxylate/atropine 2 Tablet(s) Oral every 8 hours  enoxaparin Injectable 60 milliGRAM(s) SubCutaneous every 12 hours  fat emulsion (Fish Oil and Plant Based) 20% Infusion 0.774 Gm/kG/Day IV Continuous <Continuous>  glucagon  Injectable 1 milliGRAM(s) IntraMuscular once  insulin lispro (ADMELOG) corrective regimen sliding scale   SubCutaneous every 6 hours  loperamide 4 milliGRAM(s) Oral every 8 hours  melatonin 3 milliGRAM(s) Oral at bedtime PRN  metoprolol succinate ER 25 milliGRAM(s) Oral daily  mirtazapine 30 milliGRAM(s) Oral at bedtime  multivitamin 1 Tablet(s) Oral daily  ondansetron Injectable 4 milliGRAM(s) IV Push every 6 hours PRN  opium Tincture 6 milliGRAM(s) Oral every 12 hours  pantoprazole    Tablet 40 milliGRAM(s) Oral two times a day  Parenteral Nutrition - Adult 1 Each TPN Continuous <Continuous>  psyllium Powder 1 Packet(s) Oral every 8 hours  sacubitril 24 mG/valsartan 26 mG 1 Tablet(s) Oral two times a day  silver sulfADIAZINE 1% Cream 1 Application(s) Topical daily  sodium chloride 0.9% lock flush 10 milliLiter(s) IV Push every 1 hour PRN  spironolactone 25 milliGRAM(s) Oral daily  zinc sulfate 220 milliGRAM(s) Oral daily      Objective:  Vital Signs Last 24 Hrs  T(C): 37.2 (2023 09:15), Max: 38.6 (2023 16:41)  T(F): 99 (2023 09:15), Max: 101.5 (2023 16:41)  HR: 112 (2023 09:16) (94 - 112)  BP: 117/64 (2023 09:16) (72/51 - 148/67)  BP(mean): 82 (2023 09:16) (57 - 96)  RR: 17 (2023 09:16) (14 - 18)  SpO2: 100% (2023 09:16) (95% - 100%)    Parameters below as of 2023 09:16  Patient On (Oxygen Delivery Method): room air        PHYSICAL EXAM:  Constitutional: Well-developed, well nourished  Eyes: PERRLA, EOMI  Ear/Nose/Throat: no oral lesion, no sinus tenderness on percussion	  Neck:no JVD, no lymphadenopathy, supple  Respiratory: CTA bilateral  Cardiovascular: S1S2, RRR, no murmurs  Gastrointestinal: soft, NTND, (+) BS, no HSM  Extremities: no c/e/e  Skin: no rashes    LABS:                        8.8    14.63 )-----------( 486      ( 2023 05:30 )             27.9     01-29    134<L>  |  103  |  15  ----------------------------<  138<H>  3.9   |  24  |  0.62    Ca    8.6      2023 05:30  Phos  2.9     01-29  Mg     1.8     -29        Urinalysis Basic - ( 2023 23:54 )    Color: Yellow / Appearance: Clear / S.010 / pH: x  Gluc: x / Ketone: NEGATIVE  / Bili: Negative / Urobili: 0.2 E.U./dL   Blood: x / Protein: Trace mg/dL / Nitrite: NEGATIVE   Leuk Esterase: Trace / RBC: < 5 /HPF / WBC 5-10 /HPF   Sq Epi: x / Non Sq Epi: 0-5 /HPF / Bacteria: Present /HPF        MICROBIOLOGY:            RADIOLOGY & ADDITIONAL STUDIES: Infectious Diseases Progress Note:    SUBJECTIVE: Patient seen and examined at bedside. Pt is very upset about leaking of ileostomy and overall very upset with his current state in the hospital. We spoke at length about the patient's hospital course and the events that have transpired leading up to his current status. Otherwise, pt has no acute complaints. Patient denies fever, chills, HA, nausea, vomiting, abdominal pain, dysuria, diarrhea.    Allergies    No Known Allergies    Intolerances        ANTIBIOTICS/RELEVANT:  antimicrobials  piperacillin/tazobactam IVPB.. 3.375 Gram(s) IV Intermittent every 8 hours    immunologic:  influenza  Vaccine (HIGH DOSE) 0.7 milliLiter(s) IntraMuscular once      OTHER:  acetaminophen     Tablet .. 650 milliGRAM(s) Oral every 6 hours PRN  aMIOdarone    Tablet 100 milliGRAM(s) Oral every 24 hours  ascorbic acid 500 milliGRAM(s) Oral daily  chlorhexidine 2% Cloths 1 Application(s) Topical <User Schedule>  chlorhexidine 2% Cloths 1 Application(s) Topical <User Schedule>  dextrose 5%. 1000 milliLiter(s) IV Continuous <Continuous>  dextrose 5%. 1000 milliLiter(s) IV Continuous <Continuous>  dextrose Oral Gel 15 Gram(s) Oral once PRN  diphenoxylate/atropine 2 Tablet(s) Oral every 8 hours  enoxaparin Injectable 60 milliGRAM(s) SubCutaneous every 12 hours  fat emulsion (Fish Oil and Plant Based) 20% Infusion 0.774 Gm/kG/Day IV Continuous <Continuous>  glucagon  Injectable 1 milliGRAM(s) IntraMuscular once  insulin lispro (ADMELOG) corrective regimen sliding scale   SubCutaneous every 6 hours  loperamide 4 milliGRAM(s) Oral every 8 hours  melatonin 3 milliGRAM(s) Oral at bedtime PRN  metoprolol succinate ER 25 milliGRAM(s) Oral daily  mirtazapine 30 milliGRAM(s) Oral at bedtime  multivitamin 1 Tablet(s) Oral daily  ondansetron Injectable 4 milliGRAM(s) IV Push every 6 hours PRN  opium Tincture 6 milliGRAM(s) Oral every 12 hours  pantoprazole    Tablet 40 milliGRAM(s) Oral two times a day  Parenteral Nutrition - Adult 1 Each TPN Continuous <Continuous>  psyllium Powder 1 Packet(s) Oral every 8 hours  sacubitril 24 mG/valsartan 26 mG 1 Tablet(s) Oral two times a day  silver sulfADIAZINE 1% Cream 1 Application(s) Topical daily  sodium chloride 0.9% lock flush 10 milliLiter(s) IV Push every 1 hour PRN  spironolactone 25 milliGRAM(s) Oral daily  zinc sulfate 220 milliGRAM(s) Oral daily      Objective:  Vital Signs Last 24 Hrs  T(C): 37.2 (2023 09:15), Max: 38.6 (2023 16:41)  T(F): 99 (2023 09:15), Max: 101.5 (2023 16:41)  HR: 112 (2023 09:16) (94 - 112)  BP: 117/64 (2023 09:16) (72/51 - 148/67)  BP(mean): 82 (2023 09:16) (57 - 96)  RR: 17 (2023 09:16) (14 - 18)  SpO2: 100% (2023 09:16) (95% - 100%)    Parameters below as of 2023 09:16  Patient On (Oxygen Delivery Method): room air        PHYSICAL EXAM:  Constitutional: cachectic, NAD  Eyes: PERRLA, EOMI  Ear/Nose/Throat: no oral lesion, no sinus tenderness on percussion	  Neck: no JVD, no lymphadenopathy, supple  Respiratory: CTA bilateral  Cardiovascular: S1S2, RRR, no murmurs  Gastrointestinal: scaphoid, ileostomy with bilious output  Extremities: LLE dry gangrene with demarcation below knee  Skin: no rashes    LABS:                        8.8    14.63 )-----------( 486      ( 2023 05:30 )             27.9     01-    134<L>  |  103  |  15  ----------------------------<  138<H>  3.9   |  24  |  0.62    Ca    8.6      2023 05:30  Phos  2.9     -  Mg     1.8     -        Urinalysis Basic - ( 2023 23:54 )    Color: Yellow / Appearance: Clear / S.010 / pH: x  Gluc: x / Ketone: NEGATIVE  / Bili: Negative / Urobili: 0.2 E.U./dL   Blood: x / Protein: Trace mg/dL / Nitrite: NEGATIVE   Leuk Esterase: Trace / RBC: < 5 /HPF / WBC 5-10 /HPF   Sq Epi: x / Non Sq Epi: 0-5 /HPF / Bacteria: Present /HPF        MICROBIOLOGY:            RADIOLOGY & ADDITIONAL STUDIES:

## 2023-01-30 NOTE — PROGRESS NOTE ADULT - PROBLEM SELECTOR PLAN 2
.  remains bedbound, pps 30%, able to feed self but requires assistance with ADLs  - cw supportive care, reposition q2, skin care .  remains bedbound, pps 30%, able to feed self but requires assistance with ADLs  - cw supportive care, reposition q2, skin care  - PT recommending ROWAN, however pt appears to have poor motivation to participate in PT

## 2023-01-30 NOTE — PROGRESS NOTE ADULT - PROBLEM SELECTOR PLAN 3
.  at OSH, Found to have PE, atrial flutter, large LV thrombus. transferred to [facility] 12/7, course complicated by shock (resolved), LLE acute limb ischemia and SMA thrombosis, now s/p SBR w/ loop ileostomy   - pending AKA today, deemed intermediate risk for intermediate risk procedure   - appreciate chronic pain mgmt post op .  at OSH, Found to have PE, atrial flutter, large LV thrombus. transferred to [facility] 12/7, course complicated by shock, LLE acute limb ischemia and SMA thrombosis  - Vasc Surg recommending AKA when LLE has demarcated, ?outpatient  - pt has been unable to be nutritionally optimized for AKA .  previously attributed 2/2 intra-abdominal hematoma. CT A/P 1/26 with findings of "Persistent cecal mural edema and mucosal hyperenhancement, probably inflammatory or infectious"  - on zosyn for indefinable course per ID   - Fevers likely to persist without appropriate source control by surgical intervention, is not nutritionally optimized for OR

## 2023-01-30 NOTE — PROGRESS NOTE ADULT - ASSESSMENT
78 yo male with prolonged admission, mesenteric ischemia s/p SBR 12/2022, c/b intra-abdominal hematoma, LE gangrene pending possible AKA (pending possible nutrional optimization). Resolving fevers and gradually increasing leukocytosis. Blood culture sent by primary team 1/25/23--ngtd. Fevers previously attributed to intra-abdominal hematoma. CT A/P 1/26 with findings of "Persistent cecal mural edema and mucosal hyperenhancement, probably inflammatory or infectious". Pt continues to spike fevers, likely to persist without appropriate source control by surgical intervention    Recommendations:  - BCx 1/25 NGTD  - Continue Zosyn 3.375g IV q8hrs   - Recommend repeat CT A/P w/ contrast in the next week to assess for improvement of abscess     ID Team 1 will sign off. Note not complete until attested by Attending.    Plan Discussed with Attending Dr. 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 Resolving fevers and gradually increasing leukocytosis. Blood culture sent by primary team 1/25/23--ngtd. Fevers previously attributed to intra-abdominal hematoma. CT A/P 1/26 with findings of "Persistent cecal mural edema and mucosal hyperenhancement, probably inflammatory or infectious". Pt continues to spike fevers, likely to persist without appropriate source control by surgical intervention in setting of malnutrition.    Recommendations:  - BCx 1/25 NGTD  - Continue Zosyn 3.375g IV q8hrs   - Recommend repeat CT A/P w/ contrast in the next week to assess for improvement of abscess     ID Team 1 will sign off. Note not complete until attested by Attending.    Plan Discussed with Attending Dr. Yashira Stewart

## 2023-01-30 NOTE — PROGRESS NOTE ADULT - PROBLEM SELECTOR PLAN 4
.  frustrated throughout complicated hospital course  - support provided  - massage therapy consulted  -  recs noted .  at OSH, Found to have PE, atrial flutter, large LV thrombus. transferred to [facility] 12/7, course complicated by shock, LLE acute limb ischemia and SMA thrombosis  - Vasc Surg recommending AKA when LLE has demarcated, ?outpatient  - pt has been unable to be nutritionally optimized for AKA

## 2023-01-30 NOTE — CHART NOTE - NSCHARTNOTEFT_GEN_A_CORE
3 Day Calorie Count  (1/26-1/28):  Day 1: 136kcal,  4g pro  Meeting 8% estimated energy and 4.7% estimated protein needs  Day 2: 1226kcal,  66g pro  Meeting 75% estimated energy and 77% estimated protein needs  Day 3: 535kcal,  20g pro  Meeting 33% estimated energy and 24% estimated protein needs    CALORIE COUNT ASSESSMENT:  Pt noted to be inconsistent with PO intake due to having a very limited appetite. Cont to encourage adequate PO intake during meals providing pt with TPN cont to meet 100% of est needs. RD to follow and complete full assessment.     Recommendations:  1. Cont with regular diet with Ensure Enlive TID (1050 kcal, 60g protein, 540mL free H2O)  2. Due to persistent malabsorption, cont with current TPN regimen to meet 100% of est needs until outpt via ostomy improves. Recommend; 275g Dex, 90g AA, 50g SMOF lipids to provide 1795 kcal, 90g pro, GIR 2.93, 1.38 g/kg pro ABW. RD to follow.  >> Cont to trend TG weekly  3. Recommend addition of MVI daily  4. Monitor Skin, Wts daily, GOC. Pain/GI per team.   >>Cont with imodium and metamucil per team  5. Labs: monitor BMP, CBC, glucose, lytes - Replete PRN, trend renal indices, LFts, POCT.  6. RD to remain available for additional Recs.

## 2023-01-30 NOTE — BH CONSULTATION LIAISON PROGRESS NOTE - PATIENT HAS
New onset suicidality/Aggression since prior evaluation…
New onset suicidality/Aggression since prior evaluation…
Change in condition (leading to increased suicidal or violence risk) reported by primary team…
New onset suicidality/Aggression since prior evaluation…

## 2023-01-30 NOTE — PROGRESS NOTE ADULT - ASSESSMENT
77y y/o Male with significant PMHx of IVDU found unresponsive NH, resolved w Narcan. Transferred from OSH to [facility] for further management of  PE, atrial flutter, and large LV thrombus. 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  LLE Ischemia has not yet fully demarcated, Vasc rec AKA pending medical optimization. Persistent fevers. CT A/P 1/26 with persistent RLQ abscess, not amenable to drainage by IR. Severe protein calorie malnutrition, deconditioned, poor coping.  Palliative care reconsulted for [facility].       ·	Sister/HCP Sophie concerned that pt is severely deconditioned and approaching end of life; she fears he has no will to participate w PT meaningfully at [facility] and that he will never be medically optimized to receive AKA.  Meeting with sister/HCP Sophie and pt on Wednesday 1pm.     incomplete   full note to follow  77y y/o M PMHx of IVDU found unresponsive NH, resolved w Narcan. Transferred from OSH to [facility] for further management of PE, atrial flutter, LV thrombus. 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  LLE Ischemia has not yet demarcated, VascSg rec AKA pending medical optimization. Persistent fevers. CT A/P 1/26 with persistent RLQ abscess, not amenable to drainage by IR. Severe protein calorie malnutrition, deconditioned, poor coping.  Palliative care reconsulted for [facility].       ·	Sister/HCP Sophie concerned that pt is severely deconditioned and approaching end of life; she fears he has no will to participate w PT meaningfully at [facility] and that he will never be medically optimized to receive AKA.  Meeting with sister/HCP Sophie and pt on Wednesday 1pm.

## 2023-01-30 NOTE — PROGRESS NOTE ADULT - ASSESSMENT
77M with PMHx of IVDU found unresponsive at his nursing home, resolved after Narcan in the field and brought to [facility]. Found to have PE, atrial flutter, and large LV thrombus on echo. Transferred to [facility] for further management. Pt c/o abdominal pain on 12/10 CTA showing mid SMA with embolus. Abnormal distal small bowel loops and cecum with dilatation and pneumatosis suggesting infarcted bowel. One or two tiny foci of intrahepatic portal vein pneumatosis. Segmental infarction upper pole left kidney. Now s/p Ex lap, SMA embolectomy, 80cm SBR, abthera vac left in discontinuity (12/11) and transferred to SICU intubated. S/p second look (12/13) and most recently s/p OR for 3rd look, end-to-end anastomosis of remaining bowel, loop ileostomy and abdomen closure (12/15). LLE ischemia, AKA delayed by nutritional optimization.    Regular diet w/ ensures  PPN, likely PICC placement 1/26  f/u blood cxs   Pain/nausea control  A flutter now s/p cardioversion, amio 100 QD, toprol XL 25 QD  LV thrombus w LLE limb ischemia  Lovenox  OOBA/IS  AKA likely outpatient  AM labs  Dispo: [facility] pending auth and placement   77M with PMHx of IVDU found unresponsive at his nursing home, resolved after Narcan in the field and brought to [facility]. Found to have PE, atrial flutter, and large LV thrombus on echo. Transferred to [facility] for further management. Pt c/o abdominal pain on 12/10 CTA showing mid SMA with embolus. Abnormal distal small bowel loops and cecum with dilatation and pneumatosis suggesting infarcted bowel. One or two tiny foci of intrahepatic portal vein pneumatosis. Segmental infarction upper pole left kidney. Now s/p Ex lap, SMA embolectomy, 80cm SBR, abthera vac left in discontinuity (12/11) and transferred to SICU intubated. S/p second look (12/13) and most recently s/p OR for 3rd look, end-to-end anastomosis of remaining bowel, loop ileostomy and abdomen closure (12/15). LLE ischemia, AKA delayed by nutritional optimization.    Regular diet w/ ensures  PPN  f/u blood cxs   Pain/nausea control  A flutter now s/p cardioversion, amio 100 QD, toprol XL 25 QD  LV thrombus w LLE limb ischemia  Lovenox  OOBA/IS  AKA likely outpatient  AM labs  Dispo: [facility] pending auth and placement

## 2023-01-30 NOTE — PROGRESS NOTE ADULT - PROBLEM SELECTOR PLAN 8
.    Complex medical decision making / symptom management in the setting of advanced illness. Ongoing psychosocial support provided at bedside.     Coping:  well[  ] with difficulty[ x ] poor coping[  ] unable to assess[  ]   Support system: strong[  ] adequate[ x ] inadequate[  ]    Active Psychosocial Referrals:   SW/CM[ x ] PT/OT[  ] Hospice[  ]  Ethics[  ]  Morelia Vidal Patient/Family Support[  ] Chaplaincy[  x ]  Massage Therapy[ x ] Music Therapy [  ] Holistic RN[  ]    Active Outpatient Palliative Care Referrals:  Supportive Oncology Clinic [ ]  Geriatrics Clinic [ ]  [facility] [ ]    - For new or uncontrolled symptoms, please call Palliative Care at [phone]. The service is available 24/7 (including nights & weekends) to provide symptom management recommendations over the phone as appropriate  - Will continue to follow for ongoing GOC discussion / titration of palliative regimen. Emotional support provided, questions answered. .    Complex medical decision making / symptom management in the setting of advanced illness. Ongoing psychosocial support provided at bedside.     Meeting w sister Wednesday 1 pm     Coping:  well[  ] with difficulty[ x ] poor coping[  ] unable to assess[  ]   Support system: strong[  ] adequate[ x ] inadequate[  ]    Active Psychosocial Referrals:   SW/ERIN[ x ] PT/OT[  ] Hospice[  ]  Ethics[  ]  Morelia Vidal Patient/Family Support[  ] Chaplaincy[  x ]  Massage Therapy[ x ] Music Therapy [  ] Holistic RN[  ]    - For new or uncontrolled symptoms, please call Palliative Care at [phone]. The service is available 24/7 (including nights & weekends) to provide symptom management recommendations over the phone as appropriate  - Will continue to follow for ongoing GOC discussion / titration of palliative regimen. Emotional support provided, questions answered.

## 2023-01-30 NOTE — BH CONSULTATION LIAISON PROGRESS NOTE - NSBHASSESSMENTFT_PSY_ALL_CORE
Patient is a 77 year old male with history of substance use and no significant past medical history (poor medical follow up, last PCP visit 20 years prior to admission), presenting with unresponsiveness from nursing home to [facility], found to be in Aflutter w RVR with subsegmental PE RUL, transferred to [facility], found to have LV thrombus and worsening HF, EF 10-15%. 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 Course further complicated by LLE pulselessness and ulcerations that will ultimately require BKA. Psychiatry consulted due to chronic depressed mood in the hospital and suicidality. Per staff pt verbalized in am SI. he was put back on 1:1 observation.    Patient currently presents with chronic low mood, similar with other evaluations, due to hopelessness related to the progression of his medical complications, poorly controlled pain and impact on his independence. As with other evaluations in the past, patient reports SI but without intent or means. Patient does not appear at this time cognitively impaired, no AVH/Pi were observed.      Plan:    -continue for now 1:1  -consider palliative consult and pain management consult    -continue mirtazapine 30mg QHS for ongoing depressive sx and insomnia, will observe for response    -Encourage sleep hygiene and limiting sleep during the day  -delirium precautions  - When medically possible, group medical visits/interventions together such that the patient has extended periods of uninterrupted sleep.   -pain management as per primary team  - The patient greatly enjoys playing chess; a chess board is available through [employer] ([address] [employer]); the patient is aware that he can request the board be brought.  -Psychology will follow up for individual psychotherapy

## 2023-01-30 NOTE — PROVIDER CONTACT NOTE (OTHER) - ASSESSMENT
Pt is stable, complaining of pain in the back. Sating well, BP within the parameters, temp 100.2 oral, ROSAMARIA Ireland made aware.

## 2023-01-30 NOTE — PROGRESS NOTE ADULT - PROBLEM SELECTOR PLAN 6
.  - DNR DNI 12/27 MOLST completed-- ok to rescind for procedure/AKA  - [facility] sister Sophie Strange # [phone] (cell)  [phone] .  CHF Severe RV dysfxn etiology tachy induced cardiomyopathy   - 12/27 shows EF 10-15% with overall hypokinesis  - hospice eligible overall FTT picture

## 2023-01-30 NOTE — BH CONSULTATION LIAISON PROGRESS NOTE - RISK ASSESSMENT
moderate risk of SI: patient has an acute stressors (medical conditions getting worse), feels hopeless, has passive SI with no plan; protective factors remain his motivation to improve, engagement in medical treatment, no access to means, resilience 
Staff contacted  stating that the patient verbalized suicidal ideation. Upon evaluation, the patient reported non-specific active suicidal thoughts, though strongly denied any suicidal plan or intent. While the patient's mood appears to have worsened, the patient denies suicidal plan/intent, does not have an apparent history of suicidal behavior, readily verbalizes his concerns/distress to staff members, and denies that he would act on these thoughts due to a "will to live", which he states confidently. Moderate suicide risk, though patient does not appear to be at imminent risk of harm to self or others. Statements appear in response to feelings of hopelessness and frustration, without any evidence of intent to act. 1:1 not needed at this time, though enhanced supervision would be beneficial given symptom development. 
Patient remains at low to moderate risk of suicidality; pt's distress is related to his current medical stressors and acute pain; he endorses SI but without intent or means. He is currently on 1:1 observation 
See Interval History

## 2023-01-30 NOTE — PROGRESS NOTE ADULT - SUBJECTIVE AND OBJECTIVE BOX
Patient is a 77y old  Male who presents with a chief complaint of A flutter (2023 17:59)    INTERVAL EVENTS:  - tolerating diet   - no dyspnea.   - dysthymic.   - mild  dysuria today     SUBJECTIVE:  Patient was seen and examined at bedside.  Review of systems: No CP, dyspnea, nausea or vomiting, LE edema.     Diet, Regular:     Start Time: 19:00  Supplement Feeding Modality:  Oral  Ensure Enlive Cans or Servings Per Day:  1       Frequency:  Three Times a day (23 @ 06:53) [Active]    MEDICATIONS:  MEDICATIONS  (STANDING):  aMIOdarone    Tablet 100 milliGRAM(s) Oral every 24 hours  ascorbic acid 500 milliGRAM(s) Oral daily  chlorhexidine 2% Cloths 1 Application(s) Topical <User Schedule>  chlorhexidine 2% Cloths 1 Application(s) Topical <User Schedule>  dextrose 5%. 1000 milliLiter(s) (100 mL/Hr) IV Continuous <Continuous>  dextrose 5%. 1000 milliLiter(s) (50 mL/Hr) IV Continuous <Continuous>  diphenoxylate/atropine 2 Tablet(s) Oral every 8 hours  enoxaparin Injectable 60 milliGRAM(s) SubCutaneous every 12 hours  fat emulsion (Fish Oil and Plant Based) 20% Infusion 0.7764 Gm/kG/Day (20.8 mL/Hr) IV Continuous <Continuous>  glucagon  Injectable 1 milliGRAM(s) IntraMuscular once  influenza  Vaccine (HIGH DOSE) 0.7 milliLiter(s) IntraMuscular once  insulin lispro (ADMELOG) corrective regimen sliding scale   SubCutaneous every 6 hours  loperamide 4 milliGRAM(s) Oral every 8 hours  metoprolol succinate ER 25 milliGRAM(s) Oral daily  mirtazapine 30 milliGRAM(s) Oral at bedtime  multivitamin 1 Tablet(s) Oral daily  opium Tincture 6 milliGRAM(s) Oral every 12 hours  pantoprazole    Tablet 40 milliGRAM(s) Oral two times a day  Parenteral Nutrition - Adult 1 Each (83 mL/Hr) TPN Continuous <Continuous>  piperacillin/tazobactam IVPB.. 3.375 Gram(s) IV Intermittent every 8 hours  psyllium Powder 1 Packet(s) Oral every 8 hours  sacubitril 24 mG/valsartan 26 mG 1 Tablet(s) Oral two times a day  silver sulfADIAZINE 1% Cream 1 Application(s) Topical daily  spironolactone 25 milliGRAM(s) Oral daily  zinc sulfate 220 milliGRAM(s) Oral daily    MEDICATIONS  (PRN):  acetaminophen     Tablet .. 650 milliGRAM(s) Oral every 6 hours PRN Temp greater or equal to 38C (100.4F), Mild Pain (1 - 3)  dextrose Oral Gel 15 Gram(s) Oral once PRN Blood Glucose LESS THAN 70 milliGRAM(s)/deciliter  melatonin 3 milliGRAM(s) Oral at bedtime PRN Insomnia  ondansetron Injectable 4 milliGRAM(s) IV Push every 6 hours PRN Nausea and/or Vomiting  sodium chloride 0.9% lock flush 10 milliLiter(s) IV Push every 1 hour PRN Pre/post blood products, medications, blood draw, and to maintain line patency    Allergies    No Known Allergies    Intolerances    OBJECTIVE:  Vital Signs Last 24 Hrs  T(C): 38.2 (2023 04:32), Max: 38.2 (2023 04:32)  T(F): 100.7 (2023 04:32), Max: 100.7 (2023 04:32)  HR: 126 (2023 03:35) (106 - 126)  BP: 128/80 (2023 03:35) (98/65 - 128/80)  BP(mean): 96 (2023 03:35) (74 - 96)  RR: 18 (2023 03:35) (17 - 18)  SpO2: 100% (2023 03:35) (94% - 100%)    Parameters below as of 2023 03:35  Patient On (Oxygen Delivery Method): room air      I&O's Summary    2023 07:01  -  2023 07:00  --------------------------------------------------------  IN: 3162.4 mL / OUT: 2900 mL / NET: 262.4 mL    2023 07:01  -  2023 06:29  --------------------------------------------------------  IN: 2328 mL / OUT: 2200 mL / NET: 128 mL    PHYSICAL EXAM:  Gen: Reclining in bed at time of exam, appears cachectic   HEENT: temporal wasting present ; MMM, poor dentition   Neck: supple, trachea at midline  CV: borderline tachycardia , +S1/S2  Pulm: adequate respiratory effort, no increase in work of breathing  Abd: soft, ND ; ostomy in place   Skin: warm and dry,   Ext: left leg with gangrene ; right foot not cold. No lower extremity edema   Neuro: AOx3, speaking in full sentences  Psych: behavior appropriate, dysthymic     LABS:                        9.6    13.09 )-----------( 510      ( 2023 05:51 )             30.3     01-31    x   |  100  |  16  ----------------------------<  134<H>  3.8   |  x   |  0.66    Ca    8.7      2023 05:51  Phos  3.0     01-31  Mg     2.0     01-31          CAPILLARY BLOOD GLUCOSE      POCT Blood Glucose.: 117 mg/dL (2023 05:35)  POCT Blood Glucose.: 146 mg/dL (2023 00:26)  POCT Blood Glucose.: 169 mg/dL (2023 11:45)    Urinalysis Basic - ( 2023 23:54 )    Color: Yellow / Appearance: Clear / S.010 / pH: x  Gluc: x / Ketone: NEGATIVE  / Bili: Negative / Urobili: 0.2 E.U./dL   Blood: x / Protein: Trace mg/dL / Nitrite: NEGATIVE   Leuk Esterase: Trace / RBC: < 5 /HPF / WBC 5-10 /HPF   Sq Epi: x / Non Sq Epi: 0-5 /HPF / Bacteria: Present /HPF        MICRODATA:      RADIOLOGY/OTHER STUDIES:

## 2023-01-30 NOTE — PROGRESS NOTE ADULT - PROBLEM SELECTOR PLAN 7
.    Complex medical decision making / symptom management in the setting of advanced illness. Ongoing psychosocial support provided at bedside.     Coping:  well[  ] with difficulty[ x ] poor coping[  ] unable to assess[  ]   Support system: strong[  ] adequate[ x ] inadequate[  ]    Active Psychosocial Referrals:   SW/CM[ x ] PT/OT[  ] Hospice[  ]  Ethics[  ]  Morelia Vidal Patient/Family Support[  ] Chaplaincy[  x ]  Massage Therapy[ x ] Music Therapy [  ] Holistic RN[  ]    Active Outpatient Palliative Care Referrals:  Supportive Oncology Clinic [ ]  Geriatrics Clinic [ ]  [facility] [ ]    - For new or uncontrolled symptoms, please call Palliative Care at [phone]. The service is available 24/7 (including nights & weekends) to provide symptom management recommendations over the phone as appropriate  - Will continue to follow for ongoing GOC discussion / titration of palliative regimen. Emotional support provided, questions answered. .  - DNR DNI 12/27 MOLST completed-- ok to rescind for procedure/AKA  - [facility] sister Sophie Strange # [phone] (cell)  [phone] .  - DNR DNI 12/27 MOLST completed  - [facility] sister Sophie Strange # [phone] (cell)  [phone]

## 2023-01-30 NOTE — BH CONSULTATION LIAISON PROGRESS NOTE - NSBHATTESTCOMMENTATTENDFT_PSY_A_CORE
Patient is a 77 year old male with history of substance use and no significant past medical history (poor medical follow up, last PCP visit 20 years prior to admission), presenting with unresponsiveness from nursing home to [facility], found to be in Aflutter w RVR with subsegmental PE RUL, transferred to [facility], found to have LV thrombus and worsening HF, EF 10-15%. 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 Course further complicated by LLE pulselessness and ulcerations that will ultimately require BKA. Psychiatry consulted due to chronic depressed mood in the hospital and suicidality. Per staff pt verbalized in am SI. he was put back on 1:1 observation.    Patient currently presents with chronic low mood, similar with other evaluations, due to hopelessness related to the progression of his medical complications, poorly controlled pain and impact on his independence. As with other evaluations in the past, patient reports SI but without intent or means. Patient does not appear at this time cognitively impaired, no AVH/Pi were observed.  Plan:  -continue for now 1:1  -consider palliative consult and pain management consult  -continue mirtazapine 30mg QHS for ongoing depressive sx and insomnia, will observe for response  -psychology to follow up with pt daily

## 2023-01-30 NOTE — PROGRESS NOTE ADULT - SUBJECTIVE AND OBJECTIVE BOX
SUBJECTIVE: Patient seen and examined bedside; no events overnight, HD stable, sleepy, tolerated well dressing change.    aMIOdarone    Tablet 100 milliGRAM(s) Oral every 24 hours  enoxaparin Injectable 60 milliGRAM(s) SubCutaneous every 12 hours  metoprolol succinate ER 25 milliGRAM(s) Oral daily  piperacillin/tazobactam IVPB.. 3.375 Gram(s) IV Intermittent every 8 hours  sacubitril 24 mG/valsartan 26 mG 1 Tablet(s) Oral two times a day  spironolactone 25 milliGRAM(s) Oral daily      Vital Signs Last 24 Hrs  T(C): 37.4 (2023 04:36), Max: 38.6 (2023 16:41)  T(F): 99.3 (2023 04:36), Max: 101.5 (2023 16:41)  HR: 96 (2023 06:05) (94 - 112)  BP: 110/62 (2023 06:05) (72/51 - 148/67)  BP(mean): 81 (2023 06:05) (57 - 96)  RR: 18 (2023 06:05) (14 - 18)  SpO2: 95% (2023 06:05) (95% - 98%)    Parameters below as of 2023 06:05  Patient On (Oxygen Delivery Method): room air      I&O's Detail    2023 07:01  -  2023 07:00  --------------------------------------------------------  IN:    dextrose 5% + sodium chloride 0.45%: 120 mL    Fat Emulsion (Fish Oil &amp; Plant Based) 20% Infusion: 166.4 mL    IV PiggyBack: 300 mL    Lactated Ringers Bolus: 750 mL    PPN (Peripheral Parenteral Nutrition): 1826 mL  Total IN: 3162.4 mL    OUT:    Ileostomy (mL): 1800 mL    Voided (mL): 1100 mL  Total OUT: 2900 mL    Total NET: 262.4 mL      PE:    General: NAD, resting comfortably in bed  C/V: S1 s2, RRR  Pulm: Nonlabored breathing, no respiratory distress  Abd: Soft, NTND  Extrem: Left lower leg dry gangrene extending from toes to just below the knee, unchanged from prior exam, rewrapped with kerlix        LABS:                        8.8    14.63 )-----------( 486      ( 2023 05:30 )             27.9     -    134<L>  |  103  |  15  ----------------------------<  138<H>  3.9   |  24  |  0.62    Ca    8.6      2023 05:30  Phos  2.9       Mg     1.8             Urinalysis Basic - ( 2023 23:54 )    Color: Yellow / Appearance: Clear / S.010 / pH: x  Gluc: x / Ketone: NEGATIVE  / Bili: Negative / Urobili: 0.2 E.U./dL   Blood: x / Protein: Trace mg/dL / Nitrite: NEGATIVE   Leuk Esterase: Trace / RBC: < 5 /HPF / WBC 5-10 /HPF   Sq Epi: x / Non Sq Epi: 0-5 /HPF / Bacteria: Present /HPF        RADIOLOGY & ADDITIONAL STUDIES:

## 2023-01-30 NOTE — BH CONSULTATION LIAISON PROGRESS NOTE - NSSUICRSKFACTOR_PSY_ALL_CORE
Current and Past Psychiatric Diagnoses/Presenting Symptoms/Activating Events/Stressors
Current and Past Psychiatric Diagnoses/Activating Events/Stressors
Activating Events/Stressors

## 2023-01-30 NOTE — BH CONSULTATION LIAISON PROGRESS NOTE - OTHER
Pt's relatedness fluctuates throughout the session. He can be hostile and critical of those he is speaking with, at times. And other times, he explicitly expresses wanting to be understood and tries to connect.

## 2023-01-30 NOTE — PROGRESS NOTE ADULT - PROBLEM SELECTOR PLAN 1
.  alb 2.2   Clinical evidence indicates that the patient has Severe protein calorie malnutrition/ 3rd degree  In context of     Acute Illness/Injury (>7days)    vs    Chronic Illness (>1 month)    Energy/Food intake <50% of estimated energy requirement >5 days  Weight loss: Moderate - severe  Body Fat loss: Severe   +Cachexia, temporal wasting, muscle atrophy  Muscle mass loss: Severe  No Skin failure/pressure ulcers  Fluid Accumulation: Severe + pleural effusions   Strength: weakened severe +bedbound    Recommend:   - nutritional supplements as tolerated, nutrition consult  - can consider adding omega 3 and melatonin anticytokine agents increase fat .  alb 2.2. pt notably more thin and deconditioned compared to previous visit 1 month ago. pt has not been able to be optimized from a  nutritional standpoint for OR. on TPN.   Clinical evidence indicates that the patient has Severe protein calorie malnutrition/ 3rd degree In context of     Acute Illness/Injury (>7days)    vs    Chronic Illness (>1 month), sp 80 cm SBR    Energy/Food intake <50% of estimated energy requirement >5 days  Weight loss: Moderate - severe, frail on exam   Body Fat loss: Severe   +Cachexia, +temporal wasting, muscle atrophy  Muscle mass loss: Severe lower spine and back +pressure ulcers   Strength: weakened severe +bedbound    Recommend:   - mirtazapine 30 mg PO qHS  - nutritional supplements, zinc multivitamin   - can consider adding omega 3 and would schedule melatonin 5 mg PO q HS, anticytokine agents increase fat

## 2023-01-30 NOTE — PROGRESS NOTE ADULT - SUBJECTIVE AND OBJECTIVE BOX
SUBJECTIVE: Pt seen and examined by chief resident. Pt is doing well, resting comfortably on bed. Encouraged to eat more today.     Vital Signs Last 24 Hrs  T(C): 37.4 (2023 04:36), Max: 38.6 (2023 16:41)  T(F): 99.3 (2023 04:36), Max: 101.5 (2023 16:41)  HR: 96 (2023 06:05) (94 - 112)  BP: 110/62 (2023 06:05) (72/51 - 148/67)  BP(mean): 81 (2023 06:05) (57 - 96)  RR: 18 (2023 06:05) (14 - 18)  SpO2: 95% (2023 06:05) (95% - 98%)    Parameters below as of 2023 06:05  Patient On (Oxygen Delivery Method): room air        I&O's Summary    2023 07:01  -  2023 07:00  --------------------------------------------------------  IN: 3162.4 mL / OUT: 2900 mL / NET: 262.4 mL        Physical Exam:  General Appearance: Appears well, NAD  Pulmonary: Nonlabored breathing, no respiratory distress  Abdomen: Soft, nondisteded, nontender, ostomy with stool  Extremities: WWP, SCD's in place     LABS:                        8.8    14.63 )-----------( 486      ( 2023 05:30 )             27.9         134<L>  |  103  |  15  ----------------------------<  138<H>  3.9   |  24  |  0.62    Ca    8.6      2023 05:30  Phos  2.9       Mg     1.8             Urinalysis Basic - ( 2023 23:54 )    Color: Yellow / Appearance: Clear / S.010 / pH: x  Gluc: x / Ketone: NEGATIVE  / Bili: Negative / Urobili: 0.2 E.U./dL   Blood: x / Protein: Trace mg/dL / Nitrite: NEGATIVE   Leuk Esterase: Trace / RBC: < 5 /HPF / WBC 5-10 /HPF   Sq Epi: x / Non Sq Epi: 0-5 /HPF / Bacteria: Present /HPF

## 2023-01-30 NOTE — PROGRESS NOTE ADULT - PROBLEM SELECTOR PLAN 5
.  CHF Severe RV dysfxn etiology tachy induced cardiomyopathy   - 12/27 shows EF 10-15% with overall hypokinesis  - appreciate cards, HF recs  - hospice eligibility pending hospital course .  c/o abdominal pain on 12/10 CTA showing mid SMA with embolus. Abnormal distal small bowel loops and cecum with dilatation and pneumatosis suggesting infarcted bowel. s/p Ex lap, SMA embolectomy, 80cm SBR, abthera vac left in discontinuity (12/11) and transferred to SICU intubated. S/p second look (12/13) and most recently s/p OR for 3rd look, end-to-end anastomosis of remaining bowel, loop ileostomy and abdomen closure (12/15)  - consider incr lomotil to maximum daily dose of 2 tabs Q6h; However, with 80cm of bowel removed, may not be successful   - cw loperamide 4 mg PO q8  - cw opioium tincture 6 mg PO q12
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
## 2023-01-30 NOTE — PROGRESS NOTE ADULT - ASSESSMENT
77y y/o Male with significant PMHx of IVDU found unresponsive at his nursing home, resolved after Narcan in the field, and brought to [facility]. Found to have PE, atrial flutter, and large LV thrombus on echo. Transferred to [facility] for further management. Pt C/o abdominal pain on 12/10 CTA showing mid SMA with embolus. Abnormal distal small bowel loops and cecum with dilatation and pneumatosis suggesting infarcted bowel. One or two tiny foci of  intrahepatic portal vein pneumatosis. Segmental infarction upper pole left kidney. Now s/p Ex lap, SMA embolectomy, 80cm SBR, abthera vac left in discontinuity (12/11) and transferred to SICU intubated. S/p second look (12/13) and most recently s/p OR for 3rd look, end-to-end anastomosis of remaining bowel, loop ileostomy and abdomen closure (12/15).  LLE Ischemia has not yet fully demarcated, it is clear that the posterior calf (which is needed to heal a BKA flap) is involved and therefore only surgical option available to the patient is an AKA pending medical optimization. LLE gangrene dry at this time.     Recommendations:    - L LE unchanged from prior examinations, dry gangrene extending from toes to just below the knee, no wet gangrene observed; even though it can't be ruled out without proper imaging, low suspicion for osteomyelitis  - If clinically feasible from primary team perspective, patient okay to be discharged, no vascular surgery intervention precluding discharge  - Patient can represent at another time when nutritional status is optimized for elective amputation  - Continue daily local wound care with betadine to all skin below the line of demarcation of his left shin and Kerlix. No offloading boot, keep left heel elevated off bed.   - Continue supportive measures  - Rest of care per primary team   - Vascular surgery Team 3C will continue to follow. Please call [phone] with any questions or concerns

## 2023-01-30 NOTE — BH CONSULTATION LIAISON PROGRESS NOTE - NS_RISKASSESSMENTINTER_PSY_ALL_CORE
Low Acute Suicide Risk
Moderate Acute Suicide Risk

## 2023-01-30 NOTE — BH CONSULTATION LIAISON PROGRESS NOTE - NSSUICPROTFACT_PSY_ALL_CORE
Other
Responsibility to children, family, or others/Identifies reasons for living/Ability to cope with stress
Identifies reasons for living/Fear of death or the actual act of killing self/Ability to cope with stress

## 2023-01-30 NOTE — BH CONSULTATION LIAISON PROGRESS NOTE - NSACTIVEVENT_PSY_ALL_CORE
Triggering events leading to humiliation, shame, and/or despair (e.g., Loss of relationship, financial or health status) (real or anticipated)/Current or pending social isolation/Chronic pain or other acute medical condition/Recent onset of psychiatric illness
Chronic pain or other acute medical condition
Chronic pain or other acute medical condition/Perceived burden on family or others

## 2023-01-30 NOTE — PROGRESS NOTE ADULT - ASSESSMENT
77M first presented to [facility] from [facility] due to unresponsiveness (responded to Narcan). At [facility] [place], found to have subsegmental pulmonary embolism in RUL, rapid atrial flutter with severely reduced LVEF with LV thrombus. Transferred to [facility] on 12/7/22 for LORENZO and possible ablation. At [facility], was found to have left leg ischemia (left leg arterial thrombus on LE duplex on 12/8). Was being considered for rhythm control when he developed abdominal pain, CTA (12/10/22) showed embolus in SMA, bowel infarct, requiring ex-lap on 12/11 with SMA embolectomy, 80cm small bowel resection; On 12/13, underwent 2nd look laparotomy, with 80cm small bowel resection, closure with abthera. On 12/15, underwent 3rd look laparotomy, end-to-end anastomosis of remaining bowel, loop ileostomy and abdominal closure. Now extubated, on tele floor. Eventually underwent LORENZO/DCCV on 12/30/22, now in sinus rhythm. Currently being evaluated for possible above knee amputation for LLE ischemia (possibly deferring till next hospitalization, after optimization of nutritional status).      #Fever  #Leukocytosis   -Pt with CT abd/pelvis which showed fluid collection. Pt was seen by IR re: fluid collection however the pt's imaging was reviewed by IR and it was determined that the fluid collection is not amenable to percutaneous drainage d/t bowel obstructing a safe window.  - Continue to monitor WBC  - Will f/u  blood cx from 1/25 NTD and 1/27 Blood cx: NTD   - ID recommending continuing zosyn and reimaging abdomen in the next week,   [ ] Complaining of intermittent dysuria on today's interview. Recommend repeating UA with UCx.     # Left leg ischemia   - planning per vascular surgery and primary team  - Per vascular team the pt will need to optimize nutritional status prior to operation    #Suicidal Ideation  #Insomnia   - Pt was placed back on 1:1 observation for suicidality.   - Continuing mirtazapine to 30mg QHS for ongoing depressive sx and insomnia    # Acute Systolic Heart Failure   - Metoprolol succinate 25mg qd     # Atrial Flutter  - s/p DCCV 12/30  - EP recommending uninterrupted AC x 30 days ; January 29th was 30 days post DCCV.   -Continue Lovenox, amiodarone and metoprolol succinate    # Pulmonary embolism (subsegmental RUL)   - After completion of AC for DCCV, would need to continue AC for PE - continuing lovenox at this time    # Bowel ischemia - s/p SMA embolectomy; Small bowel resection  -Pt w/  ileostomy in place  - plan per primary team  - continue loperamide and diphenoxylate atropine    # Severe protein-calorie malnutrition  # Sacral wound   - needs nutritional optimization  - Wound care per nursing protocol     #Hyponatremia   - Sodium, Serum: 130 mmol/L (01-31-23 @ 05:51)  - Likely hypovolumic hyponatremia, due to poor/lack of PO intake and loose stools from ostomy  -Continue daily BMP    #DVT ppx - already on treatment dose lovenox for PE     #Diet  - Continue regular and  TPN    #Dispo  -As per primary team

## 2023-01-30 NOTE — PROGRESS NOTE ADULT - SUBJECTIVE AND OBJECTIVE BOX
INTERVAL EVENTS: pt has not been able to be medically optimized for L AKA. CT A/P 1/26 with findings of "Persistent cecal mural edema and mucosal hyperenhancement, probably inflammatory or infectious". Spiking fevers likel1 2/2 intraabdominal hematoma, however pts poor nutritional status is a barrier for him to return to OR for source control. alb 2.2    SUBJECTIVE/OBJECTIVE:  pt frustrated and angry during visit. c/o intermittent sharp pain to back localized to area of hematoma, which was severe Saturday, but has since improved. was receiving oxy IR 5 mg PO q6 PRN without oversedation, acute nausea, myoclonus. tylenol 650 mg x3 in last 24hs for fever. continues to have high ostomy output. poor appetite, low mood, denies nausea, denies CP, denies anxiety, denies dysphagia.  reached out to pts sister and HCP, Sophie. Plan for meeting on Wednesday at 1 pm.     ALLERGIES: No Known Allergies      MEDICATIONS: REVIEWED  MEDICATIONS  (STANDING):  aMIOdarone    Tablet 100 milliGRAM(s) Oral every 24 hours  ascorbic acid 500 milliGRAM(s) Oral daily  chlorhexidine 2% Cloths 1 Application(s) Topical <User Schedule>  chlorhexidine 2% Cloths 1 Application(s) Topical <User Schedule>  dextrose 5%. 1000 milliLiter(s) (50 mL/Hr) IV Continuous <Continuous>  dextrose 5%. 1000 milliLiter(s) (100 mL/Hr) IV Continuous <Continuous>  diphenoxylate/atropine 2 Tablet(s) Oral every 8 hours  enoxaparin Injectable 60 milliGRAM(s) SubCutaneous every 12 hours  fat emulsion (Fish Oil and Plant Based) 20% Infusion 0.7764 Gm/kG/Day (20.8 mL/Hr) IV Continuous <Continuous>  glucagon  Injectable 1 milliGRAM(s) IntraMuscular once  influenza  Vaccine (HIGH DOSE) 0.7 milliLiter(s) IntraMuscular once  insulin lispro (ADMELOG) corrective regimen sliding scale   SubCutaneous every 6 hours  loperamide 4 milliGRAM(s) Oral every 8 hours  metoprolol succinate ER 25 milliGRAM(s) Oral daily  mirtazapine 30 milliGRAM(s) Oral at bedtime  multivitamin 1 Tablet(s) Oral daily  opium Tincture 6 milliGRAM(s) Oral every 12 hours  pantoprazole    Tablet 40 milliGRAM(s) Oral two times a day  Parenteral Nutrition - Adult 1 Each (83 mL/Hr) TPN Continuous <Continuous>  Parenteral Nutrition - Adult 1 Each (83 mL/Hr) TPN Continuous <Continuous>  piperacillin/tazobactam IVPB.. 3.375 Gram(s) IV Intermittent every 8 hours  psyllium Powder 1 Packet(s) Oral every 8 hours  sacubitril 24 mG/valsartan 26 mG 1 Tablet(s) Oral two times a day  silver sulfADIAZINE 1% Cream 1 Application(s) Topical daily  spironolactone 25 milliGRAM(s) Oral daily  zinc sulfate 220 milliGRAM(s) Oral daily    MEDICATIONS  (PRN):  acetaminophen     Tablet .. 650 milliGRAM(s) Oral every 6 hours PRN Temp greater or equal to 38C (100.4F), Mild Pain (1 - 3)  dextrose Oral Gel 15 Gram(s) Oral once PRN Blood Glucose LESS THAN 70 milliGRAM(s)/deciliter  melatonin 3 milliGRAM(s) Oral at bedtime PRN Insomnia  ondansetron Injectable 4 milliGRAM(s) IV Push every 6 hours PRN Nausea and/or Vomiting  sodium chloride 0.9% lock flush 10 milliLiter(s) IV Push every 1 hour PRN Pre/post blood products, medications, blood draw, and to maintain line patency        PHYSICAL EXAM:  Constitutional:  Cachectic elderly man lying in bed, notably thinner since last exam one month ago  Eyes:  Sclerae anicteric, conjunctivae clear, ++ worsening temporal wasting   Ear/Nose/Throat:  No nasal exudate or sinus tenderness;  No buccal mucosal lesions, no pharyngeal erythema or exudate	  Neck:  Supple, no adenopathy  Respiratory:  Clear bilaterally, fair effort   Cardiovascular:  RRR, S1S2, no murmur appreciated  Gastrointestinal:  Ostomy with copious orange output, no tenderness to light palpation  Musk: generalized loss of adipose tissue, protruding clavicles ribs   Extremities:  LLE gangrene      T(C): 37.7 (01-30-23 @ 17:57), Max: 37.9 (01-30-23 @ 14:25)  HR: 106 (01-30-23 @ 16:09) (94 - 112)  BP: 103/60 (01-30-23 @ 16:09) (98/65 - 148/67)  RR: 17 (01-30-23 @ 16:09) (17 - 18)  SpO2: 97% (01-30-23 @ 16:09) (95% - 100%)  Wt(kg): --        LABS: REVIEWED  CBC:                        8.3    14.26 )-----------( 450      ( 30 Jan 2023 11:56 )             25.9     CMP:    01-30    see note  |  see note  |  see note  ----------------------------<  see note  see note   |  see note  |  see note    Ca    see note      30 Jan 2023 11:28  Phos  see note     01-30  Mg     see note     01-30        RADIOLOGY & ADDITIONAL STUDIES:   < from: CT Abdomen and Pelvis w/ Oral Cont and w/ IV Cont (01.26.23 @ 13:08) >  IMPRESSION:    1. Since  January 15, 2023, selective decreased right lower quadrant   fluid collection.  2. No intra-abdominal contrast leakage from ileostomy.  3. Persistent cecal mural edema and mucosal hyperenhancement, probably   inflammatory or infectious.  3. Unchanged multifocal narrowing with areas of near occlusion distal SMA   and diminutive terminal SMA.  < from: Xray Chest 1 View- PORTABLE-Urgent (Xray Chest 1 View- PORTABLE-Urgent .) (01.29.23 @ 18:58) >  IMPRESSION:    Lines/devices: None.    Lungs/pleura: Right costophrenic angle excluded. Within that limitation,   No pneumothorax. No pleural effusion. No focal consolidation.    Cardiomediastinal silhouette: Within normal limits.    Other: None    < from: Xray Chest 1 View- PORTABLE-Urgent (Xray Chest 1 View- PORTABLE-Urgent .) (01.25.23 @ 13:29) >    Impression:    No evidence of acute cardiopulmonary disease    < end of copied text >  < end of copied text >      < end of copied text >          DISCUSSION OF CASE:  - dw pts sister, Sophie     CARE COORDINATION:      INTERVAL EVENTS: pt has not been able to be medically optimized for L AKA. CT A/P 1/26 with findings of "Persistent cecal mural edema and mucosal hyperenhancement, probably inflammatory or infectious". Spiking fevers likel1 2/2 intraabdominal hematoma, however pts poor nutritional status is a barrier for him to return to OR for source control. alb 2.2    SUBJECTIVE/OBJECTIVE:  pt frustrated and angry during visit. c/o intermittent sharp pain to back localized to area of hematoma, which was severe Saturday, but has since improved. was receiving oxy IR 5 mg PO q6 PRN without oversedation, acute nausea, myoclonus. tylenol 650 mg x3 in last 24hs for fever. continues to have high ostomy output. poor appetite, low mood, denies nausea, denies CP, denies anxiety, denies dysphagia.  reached out to pts sister and HCP, Sophie. Plan for meeting on Wednesday at 1 pm.     ALLERGIES: No Known Allergies      MEDICATIONS: REVIEWED  MEDICATIONS  (STANDING):  aMIOdarone    Tablet 100 milliGRAM(s) Oral every 24 hours  ascorbic acid 500 milliGRAM(s) Oral daily  chlorhexidine 2% Cloths 1 Application(s) Topical <User Schedule>  chlorhexidine 2% Cloths 1 Application(s) Topical <User Schedule>  dextrose 5%. 1000 milliLiter(s) (50 mL/Hr) IV Continuous <Continuous>  dextrose 5%. 1000 milliLiter(s) (100 mL/Hr) IV Continuous <Continuous>  diphenoxylate/atropine 2 Tablet(s) Oral every 8 hours  enoxaparin Injectable 60 milliGRAM(s) SubCutaneous every 12 hours  fat emulsion (Fish Oil and Plant Based) 20% Infusion 0.7764 Gm/kG/Day (20.8 mL/Hr) IV Continuous <Continuous>  glucagon  Injectable 1 milliGRAM(s) IntraMuscular once  influenza  Vaccine (HIGH DOSE) 0.7 milliLiter(s) IntraMuscular once  insulin lispro (ADMELOG) corrective regimen sliding scale   SubCutaneous every 6 hours  loperamide 4 milliGRAM(s) Oral every 8 hours  metoprolol succinate ER 25 milliGRAM(s) Oral daily  mirtazapine 30 milliGRAM(s) Oral at bedtime  multivitamin 1 Tablet(s) Oral daily  opium Tincture 6 milliGRAM(s) Oral every 12 hours  pantoprazole    Tablet 40 milliGRAM(s) Oral two times a day  Parenteral Nutrition - Adult 1 Each (83 mL/Hr) TPN Continuous <Continuous>  Parenteral Nutrition - Adult 1 Each (83 mL/Hr) TPN Continuous <Continuous>  piperacillin/tazobactam IVPB.. 3.375 Gram(s) IV Intermittent every 8 hours  psyllium Powder 1 Packet(s) Oral every 8 hours  sacubitril 24 mG/valsartan 26 mG 1 Tablet(s) Oral two times a day  silver sulfADIAZINE 1% Cream 1 Application(s) Topical daily  spironolactone 25 milliGRAM(s) Oral daily  zinc sulfate 220 milliGRAM(s) Oral daily    MEDICATIONS  (PRN):  acetaminophen     Tablet .. 650 milliGRAM(s) Oral every 6 hours PRN Temp greater or equal to 38C (100.4F), Mild Pain (1 - 3)  dextrose Oral Gel 15 Gram(s) Oral once PRN Blood Glucose LESS THAN 70 milliGRAM(s)/deciliter  melatonin 3 milliGRAM(s) Oral at bedtime PRN Insomnia  ondansetron Injectable 4 milliGRAM(s) IV Push every 6 hours PRN Nausea and/or Vomiting  sodium chloride 0.9% lock flush 10 milliLiter(s) IV Push every 1 hour PRN Pre/post blood products, medications, blood draw, and to maintain line patency        PHYSICAL EXAM:  Constitutional:  Cachectic elderly man lying in bed, notably thinner since last exam one month ago  Eyes:  Sclerae anicteric, conjunctivae clear, ++ worsening temporal wasting   Ear/Nose/Throat:  No nasal exudate or sinus tenderness;  No buccal mucosal lesions, no pharyngeal erythema or exudate	  Neck:  Supple, no adenopathy  Respiratory:  Clear bilaterally, fair effort   Cardiovascular:  RRR, S1S2, no murmur appreciated  Gastrointestinal:  Ostomy with copious orange output, no tenderness to light palpation  Musk: generalized loss of adipose tissue, protruding clavicles ribs   Extremities:  LLE gangrene      T(C): 37.7 (01-30-23 @ 17:57), Max: 37.9 (01-30-23 @ 14:25)  HR: 106 (01-30-23 @ 16:09) (94 - 112)  BP: 103/60 (01-30-23 @ 16:09) (98/65 - 148/67)  RR: 17 (01-30-23 @ 16:09) (17 - 18)  SpO2: 97% (01-30-23 @ 16:09) (95% - 100%)  Wt(kg): --        LABS: REVIEWED  CBC:                        8.3    14.26 )-----------( 450      ( 30 Jan 2023 11:56 )             25.9     CMP:    01-30    see note  |  see note  |  see note  ----------------------------<  see note  see note   |  see note  |  see note    Ca    see note      30 Jan 2023 11:28  Phos  see note     01-30  Mg     see note     01-30        RADIOLOGY & ADDITIONAL STUDIES:   < from: CT Abdomen and Pelvis w/ Oral Cont and w/ IV Cont (01.26.23 @ 13:08) >  IMPRESSION:  1. Since  January 15, 2023, selective decreased right lower quadrant   fluid collection.  2. No intra-abdominal contrast leakage from ileostomy.  3. Persistent cecal mural edema and mucosal hyperenhancement, probably   inflammatory or infectious.  3. Unchanged multifocal narrowing with areas of near occlusion distal SMA   and diminutive terminal SMA.    < from: Xray Chest 1 View- PORTABLE-Urgent (Xray Chest 1 View- PORTABLE-Urgent .) (01.29.23 @ 18:58) >  IMPRESSION:  Lines/devices: None.  Lungs/pleura: Right costophrenic angle excluded. Within that limitation,   No pneumothorax. No pleural effusion. No focal consolidation.  Cardiomediastinal silhouette: Within normal limits.  Other: None    < from: Xray Chest 1 View- PORTABLE-Urgent (Xray Chest 1 View- PORTABLE-Urgent .) (01.25.23 @ 13:29) >  Impression:  No evidence of acute cardiopulmonary disease      DISCUSSION OF CASE:  - dw pts sister, Sophie     CARE COORDINATION:

## 2023-01-31 NOTE — BH CONSULTATION LIAISON PROGRESS NOTE - NSBHASSESSMENTFT_PSY_ALL_CORE
Patient is a 77 year old male with history of substance use and no significant past medical history (poor medical follow up, last PCP visit 20 years prior to admission), presenting with unresponsiveness from nursing home to [facility], found to be in Aflutter w RVR with subsegmental PE RUL, transferred to [facility], found to have LV thrombus and worsening HF, EF 10-15%. 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 Course further complicated by LLE pulselessness and ulcerations that will ultimately require BKA. Psychiatry consulted due to chronic depressed mood in the hospital and suicidality. Per staff pt verbalizes Si without intent when he experiences high physical distress (pain). During the weekend, pt was put back on 1:1 observation.  On assessment today pt presents in distress due to pain after having PT. He remains with chronic low mood, similar with other evaluations, due to hopelessness related to the progression of his medical complications, poorly controlled pain and impact on his independence. As with other evaluations in the past, patient reports SI but without intent or means. Patient's suicidality is conditional on his current physical state.   Patient does not appear at this time cognitively impaired, no AVH/Pi were observed.  Plan:  -continue for now 1:1  -currently followed by the Palliative team   -suggest optimizing pt's pain treatment; pt has significant history of substance use which makes him more likely to have high tolerance to pain agents  -continue mirtazapine 30mg QHS for ongoing depressive sx and insomnia, will observe for response  -Encourage sleep hygiene and limiting sleep during the day  -Delirium precautions  -Psychology to follow the pt daily for supportive psychotherapy

## 2023-01-31 NOTE — PROGRESS NOTE ADULT - ASSESSMENT
77y y/o Male with significant PMHx of IVDU found unresponsive at his nursing home, resolved after Narcan in the field, and brought to [facility]. Found to have PE, atrial flutter, and large LV thrombus on echo. Transferred to [facility] for further management. Pt C/o abdominal pain on 12/10 CTA showing mid SMA with embolus. Abnormal distal small bowel loops and cecum with dilatation and pneumatosis suggesting infarcted bowel. One or two tiny foci of  intrahepatic portal vein pneumatosis. Segmental infarction upper pole left kidney. Now s/p Ex lap, SMA embolectomy, 80cm SBR, abthera vac left in discontinuity (12/11) and transferred to SICU intubated. S/p second look (12/13) and most recently s/p OR for 3rd look, end-to-end anastomosis of remaining bowel, loop ileostomy and abdomen closure (12/15).  LLE Ischemia has not yet fully demarcated, it is clear that the posterior calf (which is needed to heal a BKA flap) is involved and therefore only surgical option available to the patient is an AKA pending medical optimization. LLE gangrene dry at this time.     Recommendations:    - LLE unchanged from prior examinations, dry gangrene extending from toes to just below the knee, no wet gangrene observed; even though it can't be ruled out without proper imaging, low suspicion for osteomyelitis  - If clinically feasible from primary team perspective, patient okay to be discharged, no vascular surgery intervention precluding discharge  - Patient can represent at another time when nutritional status is optimized for elective amputation  - Continue daily local wound care with betadine to all skin below the line of demarcation of his left shin and Kerlix. No offloading boot, keep left heel elevated off bed.   - Continue supportive measures  - Rest of care per primary team   - Vascular surgery Team 3C will continue to follow. Please call [phone] with any questions or concerns

## 2023-01-31 NOTE — PROVIDER CONTACT NOTE (OTHER) - ACTION/TREATMENT ORDERED:
per provider- cant give the patient anymore pain medications we will come to the bedside to asses him

## 2023-01-31 NOTE — PROGRESS NOTE ADULT - ASSESSMENT
77M with PMHx of IVDU found unresponsive at his nursing home, resolved after Narcan in the field and brought to [facility]. Found to have PE, atrial flutter, and large LV thrombus on echo. Transferred to [facility] for further management. Pt c/o abdominal pain on 12/10 CTA showing mid SMA with embolus. Abnormal distal small bowel loops and cecum with dilatation and pneumatosis suggesting infarcted bowel. One or two tiny foci of intrahepatic portal vein pneumatosis. Segmental infarction upper pole left kidney. Now s/p Ex lap, SMA embolectomy, 80cm SBR, abthera vac left in discontinuity (12/11) and transferred to SICU intubated. S/p second look (12/13) and most recently s/p OR for 3rd look, end-to-end anastomosis of remaining bowel, loop ileostomy and abdomen closure (12/15). LLE ischemia, AKA delayed by nutritional optimization.    Regular diet w/ ensures  PPN  Zosyn  Pain/nausea control  A flutter now s/p cardioversion, amio 100 QD, toprol XL 25 QD  LV thrombus w LLE limb ischemia  Lovenox  OOBA/IS  AKA likely outpatient  AM labs  Dispo: ROWAN pending auth and placement

## 2023-01-31 NOTE — PROGRESS NOTE ADULT - SUBJECTIVE AND OBJECTIVE BOX
Overnight: bolus 250 for 500 output at 9pm. tachy + leg pain at 0230, given oxy x1. febrile, given tylenol.    Subjective: Pt seen and examined bedside this AM by vascular team. Pts left leg dressing C/D/I. No new lower extremity pain this AM.     ROS:   Denies Headache, blurred vision, Chest Pain, SOB, Abdominal pain, nausea or vomiting     Social   piperacillin/tazobactam IVPB.. 3.375  aMIOdarone    Tablet 100  enoxaparin Injectable 60  metoprolol succinate ER 25  piperacillin/tazobactam IVPB.. 3.375  sacubitril 24 mG/valsartan 26 mG 1  spironolactone 25      Allergies    No Known Allergies    Intolerances        Vital Signs Last 24 Hrs  T(C): 38.2 (31 Jan 2023 04:32), Max: 38.2 (31 Jan 2023 04:32)  T(F): 100.7 (31 Jan 2023 04:32), Max: 100.7 (31 Jan 2023 04:32)  HR: 126 (31 Jan 2023 03:35) (106 - 126)  BP: 128/80 (31 Jan 2023 03:35) (98/65 - 128/80)  BP(mean): 96 (31 Jan 2023 03:35) (74 - 96)  RR: 18 (31 Jan 2023 03:35) (17 - 18)  SpO2: 100% (31 Jan 2023 03:35) (94% - 100%)    Parameters below as of 31 Jan 2023 03:35  Patient On (Oxygen Delivery Method): room air      I&O's Summary    30 Jan 2023 07:01  -  31 Jan 2023 07:00  --------------------------------------------------------  IN: 2743.2 mL / OUT: 2200 mL / NET: 543.2 mL        Physical Exam:  General Appearance: Appears well, NAD  Pulmonary: Nonlabored breathing, no respiratory distress  Abdomen: Soft, non distended nontender, ostomy in place   Extremities: LLE dry gangrene from toes to just below the knee, unchanged from prior exam, no obvious signs of wet gangrene      LABS:                        9.6    13.09 )-----------( 510      ( 31 Jan 2023 05:51 )             30.3     01-31    130<L>  |  100  |  16  ----------------------------<  134<H>  3.8   |  19<L>  |  0.66    Ca    8.7      31 Jan 2023 05:51  Phos  3.0     01-31  Mg     2.0     01-31    TPro  x   /  Alb  2.6<L>  /  TBili  x   /  DBili  x   /  AST  x   /  ALT  x   /  AlkPhos  x   01-31

## 2023-01-31 NOTE — PROGRESS NOTE ADULT - SUBJECTIVE AND OBJECTIVE BOX
States that he still has back pain.  Prefers to lay on one side.  Denies any other new symptoms.  Bandages applied to LLE.     ***Note in progress***    OVERNIGHT EVENTS: NAEO    SUBJECTIVE / INTERVAL HPI: Patient seen and examined at bedside. Patient denying chest pain, SOB, palpitations, cough. Patient denies fever, chills, HA, Dizziness, change in vision/hearing, N/V, abdominal pain, diarrhea, constipation, hematochezia/melena, dysuria, hematuria, new onset weakness/numbness, LE pain and/or swelling.    Remaining ROS negative       PHYSICAL EXAM:    General: WDWN  HEENT: NC/AT; PERRL, anicteric sclera; MMM  Neck: supple  Cardiovascular: +S1/S2, RRR  Respiratory: CTA B/L; no W/R/R  Gastrointestinal: soft, NT/ND; +BSx4  Extremities: WWP; no edema, clubbing or cyanosis  Vascular: 2+ radial, DP/PT pulses B/L  Neurological: AAOx3; no focal deficits  Psychiatric: pleasant mood and affect  Dermatologic: no appreciable wounds or damage to the skin    VITAL SIGNS:  Vital Signs Last 24 Hrs  T(C): 36.7 (2023 14:03), Max: 38.2 (2023 04:32)  T(F): 98.1 (2023 14:03), Max: 100.7 (2023 04:32)  HR: 106 (2023 12:04) (100 - 126)  BP: 143/63 (2023 12:04) (96/57 - 156/77)  BP(mean): 91 (2023 12:04) (72 - 110)  RR: 18 (2023 11:27) (17 - 18)  SpO2: 100% (2023 12:04) (94% - 100%)    Parameters below as of 2023 12:04  Patient On (Oxygen Delivery Method): room air          MEDICATIONS:  MEDICATIONS  (STANDING):  aMIOdarone    Tablet 100 milliGRAM(s) Oral every 24 hours  ascorbic acid 500 milliGRAM(s) Oral daily  chlorhexidine 2% Cloths 1 Application(s) Topical <User Schedule>  chlorhexidine 2% Cloths 1 Application(s) Topical <User Schedule>  dextrose 5%. 1000 milliLiter(s) (50 mL/Hr) IV Continuous <Continuous>  dextrose 5%. 1000 milliLiter(s) (100 mL/Hr) IV Continuous <Continuous>  diphenoxylate/atropine 2 Tablet(s) Oral every 8 hours  enoxaparin Injectable 60 milliGRAM(s) SubCutaneous every 12 hours  fat emulsion (Fish Oil and Plant Based) 20% Infusion 0.7764 Gm/kG/Day (20.83 mL/Hr) IV Continuous <Continuous>  glucagon  Injectable 1 milliGRAM(s) IntraMuscular once  influenza  Vaccine (HIGH DOSE) 0.7 milliLiter(s) IntraMuscular once  insulin lispro (ADMELOG) corrective regimen sliding scale   SubCutaneous every 6 hours  loperamide 4 milliGRAM(s) Oral every 8 hours  metoprolol succinate ER 25 milliGRAM(s) Oral daily  mirtazapine 30 milliGRAM(s) Oral at bedtime  multivitamin 1 Tablet(s) Oral daily  opium Tincture 6 milliGRAM(s) Oral every 12 hours  pantoprazole    Tablet 40 milliGRAM(s) Oral two times a day  Parenteral Nutrition - Adult 1 Each (83 mL/Hr) TPN Continuous <Continuous>  Parenteral Nutrition - Adult 1 Each (83 mL/Hr) TPN Continuous <Continuous>  piperacillin/tazobactam IVPB.. 3.375 Gram(s) IV Intermittent every 8 hours  psyllium Powder 1 Packet(s) Oral every 8 hours  sacubitril 24 mG/valsartan 26 mG 1 Tablet(s) Oral two times a day  silver sulfADIAZINE 1% Cream 1 Application(s) Topical daily  spironolactone 25 milliGRAM(s) Oral daily  zinc sulfate 220 milliGRAM(s) Oral daily    MEDICATIONS  (PRN):  acetaminophen     Tablet .. 650 milliGRAM(s) Oral every 6 hours PRN Temp greater or equal to 38C (100.4F), Mild Pain (1 - 3)  dextrose Oral Gel 15 Gram(s) Oral once PRN Blood Glucose LESS THAN 70 milliGRAM(s)/deciliter  melatonin 3 milliGRAM(s) Oral at bedtime PRN Insomnia  ondansetron Injectable 4 milliGRAM(s) IV Push every 6 hours PRN Nausea and/or Vomiting  sodium chloride 0.9% lock flush 10 milliLiter(s) IV Push every 1 hour PRN Pre/post blood products, medications, blood draw, and to maintain line patency      ALLERGIES:  Allergies    No Known Allergies    Intolerances        LABS:                        9.6    13.09 )-----------( 510      ( 2023 05:51 )             30.3         130<L>  |  100  |  16  ----------------------------<  134<H>  3.8   |  19<L>  |  0.66    Ca    8.7      2023 05:51  Phos  3.0       Mg     2.0         TPro  x   /  Alb  2.6<L>  /  TBili  x   /  DBili  x   /  AST  x   /  ALT  x   /  AlkPhos  x         Urinalysis Basic - ( 2023 23:54 )    Color: Yellow / Appearance: Clear / S.010 / pH: x  Gluc: x / Ketone: NEGATIVE  / Bili: Negative / Urobili: 0.2 E.U./dL   Blood: x / Protein: Trace mg/dL / Nitrite: NEGATIVE   Leuk Esterase: Trace / RBC: < 5 /HPF / WBC 5-10 /HPF   Sq Epi: x / Non Sq Epi: 0-5 /HPF / Bacteria: Present /HPF      CAPILLARY BLOOD GLUCOSE      POCT Blood Glucose.: 143 mg/dL (2023 11:25)      RADIOLOGY & ADDITIONAL TESTS: Reviewed. States that he still has back pain.  Prefers to lay on one side.  Denies any other new symptoms.  Bandages applied to LLE.     Remaining ROS negative       PHYSICAL EXAM:    Gen: Reclining in bed, no acute distress  HEENT: temporal wasting ; MMM, poor dentition   CV: mild tachycardia , +S1/S2  Pulm: diminished breath sounds at bases, no wheeze  Abd: soft, ND ; ostomy in place   Skin: warm and dry, gangrene on left leg  Ext: left leg with gangrene, and bandagedNo lower extremity edema   Neuro: speaking in full sentences, prefers to rest      VITAL SIGNS:  Vital Signs Last 24 Hrs  T(C): 36.7 (2023 14:03), Max: 38.2 (2023 04:32)  T(F): 98.1 (2023 14:03), Max: 100.7 (2023 04:32)  HR: 106 (2023 12:04) (100 - 126)  BP: 143/63 (2023 12:04) (96/57 - 156/77)  BP(mean): 91 (2023 12:04) (72 - 110)  RR: 18 (2023 11:27) (17 - 18)  SpO2: 100% (2023 12:04) (94% - 100%)    Parameters below as of 2023 12:04  Patient On (Oxygen Delivery Method): room air          MEDICATIONS:  MEDICATIONS  (STANDING):  aMIOdarone    Tablet 100 milliGRAM(s) Oral every 24 hours  ascorbic acid 500 milliGRAM(s) Oral daily  chlorhexidine 2% Cloths 1 Application(s) Topical <User Schedule>  chlorhexidine 2% Cloths 1 Application(s) Topical <User Schedule>  dextrose 5%. 1000 milliLiter(s) (50 mL/Hr) IV Continuous <Continuous>  dextrose 5%. 1000 milliLiter(s) (100 mL/Hr) IV Continuous <Continuous>  diphenoxylate/atropine 2 Tablet(s) Oral every 8 hours  enoxaparin Injectable 60 milliGRAM(s) SubCutaneous every 12 hours  fat emulsion (Fish Oil and Plant Based) 20% Infusion 0.7764 Gm/kG/Day (20.83 mL/Hr) IV Continuous <Continuous>  glucagon  Injectable 1 milliGRAM(s) IntraMuscular once  influenza  Vaccine (HIGH DOSE) 0.7 milliLiter(s) IntraMuscular once  insulin lispro (ADMELOG) corrective regimen sliding scale   SubCutaneous every 6 hours  loperamide 4 milliGRAM(s) Oral every 8 hours  metoprolol succinate ER 25 milliGRAM(s) Oral daily  mirtazapine 30 milliGRAM(s) Oral at bedtime  multivitamin 1 Tablet(s) Oral daily  opium Tincture 6 milliGRAM(s) Oral every 12 hours  pantoprazole    Tablet 40 milliGRAM(s) Oral two times a day  Parenteral Nutrition - Adult 1 Each (83 mL/Hr) TPN Continuous <Continuous>  Parenteral Nutrition - Adult 1 Each (83 mL/Hr) TPN Continuous <Continuous>  piperacillin/tazobactam IVPB.. 3.375 Gram(s) IV Intermittent every 8 hours  psyllium Powder 1 Packet(s) Oral every 8 hours  sacubitril 24 mG/valsartan 26 mG 1 Tablet(s) Oral two times a day  silver sulfADIAZINE 1% Cream 1 Application(s) Topical daily  spironolactone 25 milliGRAM(s) Oral daily  zinc sulfate 220 milliGRAM(s) Oral daily    MEDICATIONS  (PRN):  acetaminophen     Tablet .. 650 milliGRAM(s) Oral every 6 hours PRN Temp greater or equal to 38C (100.4F), Mild Pain (1 - 3)  dextrose Oral Gel 15 Gram(s) Oral once PRN Blood Glucose LESS THAN 70 milliGRAM(s)/deciliter  melatonin 3 milliGRAM(s) Oral at bedtime PRN Insomnia  ondansetron Injectable 4 milliGRAM(s) IV Push every 6 hours PRN Nausea and/or Vomiting  sodium chloride 0.9% lock flush 10 milliLiter(s) IV Push every 1 hour PRN Pre/post blood products, medications, blood draw, and to maintain line patency      ALLERGIES:  Allergies    No Known Allergies    Intolerances        LABS:                        9.6    13.09 )-----------( 510      ( 2023 05:51 )             30.3     01-31    130<L>  |  100  |  16  ----------------------------<  134<H>  3.8   |  19<L>  |  0.66    Ca    8.7      2023 05:51  Phos  3.0       Mg     2.0         TPro  x   /  Alb  2.6<L>  /  TBili  x   /  DBili  x   /  AST  x   /  ALT  x   /  AlkPhos  x         Urinalysis Basic - ( 2023 23:54 )    Color: Yellow / Appearance: Clear / S.010 / pH: x  Gluc: x / Ketone: NEGATIVE  / Bili: Negative / Urobili: 0.2 E.U./dL   Blood: x / Protein: Trace mg/dL / Nitrite: NEGATIVE   Leuk Esterase: Trace / RBC: < 5 /HPF / WBC 5-10 /HPF   Sq Epi: x / Non Sq Epi: 0-5 /HPF / Bacteria: Present /HPF      CAPILLARY BLOOD GLUCOSE      POCT Blood Glucose.: 143 mg/dL (2023 11:25)      RADIOLOGY & ADDITIONAL TESTS: Reviewed.

## 2023-01-31 NOTE — BH CONSULTATION LIAISON PROGRESS NOTE - NSBHATTESTCOMMENTATTENDFT_PSY_A_CORE
Patient is a 77 year old male with history of substance use and no significant past medical history (poor medical follow up, last PCP visit 20 years prior to admission), presenting with unresponsiveness from nursing home to [facility], found to be in Aflutter w RVR with subsegmental PE RUL, transferred to [facility], found to have LV thrombus and worsening HF, EF 10-15%. 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 Course further complicated by LLE pulselessness and ulcerations that will ultimately require BKA. Psychiatry consulted due to chronic depressed mood in the hospital and suicidality. Per staff pt verbalizes Si without intent, when he experiences high physical distress (pain). During the weekend, pt was put back on 1:1 observation.  On assessment today, pt presents in distress due to pain after having PT. He remains with chronic low mood, similar with other evaluations, due to hopelessness related to the progression of his medical complications, poorly controlled pain and impact on his independence. As with other evaluations in the past, patient reports SI but without intent or means. Patient's suicidality is conditional on his current physical state.  Patient does not appear at this time cognitively impaired, no AVH/Pi were observed.  Plan:  -continue for now 1:1  -currently followed by the Palliative team   -suggest optimizing pt's pain treatment; pt has significant history of substance use which makes him more likely to have high tolerance to pain agents  -continue mirtazapine 30mg QHS for ongoing depressive sx and insomnia, will observe for response  -Encourage sleep hygiene and limiting sleep during the day  -Delirium precautions  -Psychology to follow the pt daily for supportive psychotherapy

## 2023-01-31 NOTE — PROGRESS NOTE ADULT - SUBJECTIVE AND OBJECTIVE BOX
SUBJECTIVE: Pt seen and examined by chief resident. Pt is doing well, resting comfortably on bed. P No complaints at this time.    Vital Signs Last 24 Hrs  T(C): 38.2 (2023 04:32), Max: 38.2 (2023 04:32)  T(F): 100.7 (2023 04:32), Max: 100.7 (2023 04:32)  HR: 126 (2023 03:35) (106 - 126)  BP: 128/80 (2023 03:35) (98/65 - 128/80)  BP(mean): 96 (2023 03:35) (74 - 96)  RR: 18 (2023 03:35) (17 - 18)  SpO2: 100% (2023 03:35) (94% - 100%)    Parameters below as of 2023 03:35  Patient On (Oxygen Delivery Method): room air        I&O's Summary    2023 07:01  -  2023 07:00  --------------------------------------------------------  IN: 2328 mL / OUT: 2200 mL / NET: 128 mL        Physical Exam:  General Appearance: Appears well, NAD  Pulmonary: Nonlabored breathing, no respiratory distress  Abdomen: Soft, nondisteded, nontender, ostomy in place   Extremities: WWP, SCD's in place     LABS:                        9.6    13.09 )-----------( 510      ( 2023 05:51 )             30.3     01-31    130<L>  |  100  |  16  ----------------------------<  134<H>  3.8   |  19<L>  |  0.66    Ca    8.7      2023 05:51  Phos  3.0       Mg     2.0         TPro  x   /  Alb  2.6<L>  /  TBili  x   /  DBili  x   /  AST  x   /  ALT  x   /  AlkPhos  x   01-31      Urinalysis Basic - ( 2023 23:54 )    Color: Yellow / Appearance: Clear / S.010 / pH: x  Gluc: x / Ketone: NEGATIVE  / Bili: Negative / Urobili: 0.2 E.U./dL   Blood: x / Protein: Trace mg/dL / Nitrite: NEGATIVE   Leuk Esterase: Trace / RBC: < 5 /HPF / WBC 5-10 /HPF   Sq Epi: x / Non Sq Epi: 0-5 /HPF / Bacteria: Present /HPF

## 2023-01-31 NOTE — PROGRESS NOTE ADULT - ASSESSMENT
77M first presented to [facility] from [facility] due to unresponsiveness (responded to Narcan). At [facility], found to have subsegmental pulmonary embolism in RUL, rapid atrial flutter with severely reduced LVEF with LV thrombus. Transferred to [facility] on 12/7/22 for LORENZO and possible ablation. At [facility], was found to have left leg ischemia (left leg arterial thrombus on LE duplex on 12/8). Was being considered for rhythm control when he developed abdominal pain, CTA (12/10/22) showed embolus in SMA, bowel infarct, requiring ex-lap on 12/11 with SMA embolectomy, 80cm small bowel resection; On 12/13, underwent 2nd look laparotomy, with 80cm small bowel resection, closure with abthera. On 12/15, underwent 3rd look laparotomy, end-to-end anastomosis of remaining bowel, loop ileostomy and abdominal closure. Now extubated, on tele floor. Eventually underwent LORENZO/DCCV on 12/30/22, now in sinus rhythm. Currently being evaluated for possible above knee amputation for LLE ischemia (possibly deferring till next hospitalization, after optimization of nutritional status).      #Fever  #Leukocytosis   -Pt with CT abd/pelvis which showed fluid collection. Pt was seen by IR re: fluid collection however the pt's imaging was reviewed by IR and it was determined that the fluid collection is not amenable to percutaneous drainage d/t bowel obstructing a safe window.  - Continue to monitor WBC  - Will f/u  blood cx from 1/25 NTD and 1/27 Blood cx: NTD   - ID recommending continuing zosyn and reimaging abdomen in the next week,   [ ] Complaining of intermittent dysuria on today's interview. Recommend repeating UA with UCx.     # Left leg ischemia   - planning per vascular surgery and primary team  - Per vascular team the pt will need to optimize nutritional status prior to operation    #Suicidal Ideation  #Insomnia   - Pt was placed back on 1:1 observation for suicidality.   - Continuing mirtazapine to 30mg QHS for ongoing depressive sx and insomnia    # Acute Systolic Heart Failure   - Metoprolol succinate 25mg qd     # Atrial Flutter  - s/p DCCV 12/30  - EP recommending uninterrupted AC x 30 days ; January 29th was 30 days post DCCV.   -Continue Lovenox, amiodarone and metoprolol succinate    # Pulmonary embolism (subsegmental RUL)   - After completion of AC for DCCV, would need to continue AC for PE - continuing lovenox at this time    # Bowel ischemia - s/p SMA embolectomy; Small bowel resection  -Pt w/  ileostomy in place  - plan per primary team  - continue loperamide and diphenoxylate atropine    # Severe protein-calorie malnutrition  # Sacral wound   - needs nutritional optimization  - Wound care per nursing protocol     #Hyponatremia   - Sodium, Serum: 130 mmol/L (01-31-23 @ 05:51)  - Likely hypovolumic hyponatremia, due to poor/lack of PO intake and loose stools from ostomy  -Continue daily BMP    #DVT ppx - already on treatment dose lovenox for PE     #Diet  - Continue regular and  TPN    #Dispo  -As per primary team  77M first presented to [facility] from [facility] due to unresponsiveness (responded to Narcan). At [facility], found to have subsegmental pulmonary embolism in RUL, rapid atrial flutter with severely reduced LVEF with LV thrombus. Transferred to [facility] on 12/7/22 for LORENZO and possible ablation. At [facility], was found to have left leg ischemia (left leg arterial thrombus on LE duplex on 12/8). Was being considered for rhythm control when he developed abdominal pain, CTA (12/10/22) showed embolus in SMA, bowel infarct, requiring ex-lap on 12/11 with SMA embolectomy, 80cm small bowel resection; On 12/13, underwent 2nd look laparotomy, with 80cm small bowel resection, closure with abthera. On 12/15, underwent 3rd look laparotomy, end-to-end anastomosis of remaining bowel, loop ileostomy and abdominal closure. Now extubated, on [facility] floor. Eventually underwent LORENZO/DCCV on 12/30/22, now in sinus rhythm. Currently being evaluated for possible above knee amputation for LLE ischemia (possibly deferring till next hospitalization, after optimization of nutritional status).      #Fever  #Leukocytosis   -Pt with CT abd/pelvis which showed fluid collection. Pt was seen by IR re: fluid collection however the pt's imaging was reviewed by IR and it was determined that the fluid collection is not amenable to percutaneous drainage d/t bowel obstructing a safe window.  - Continue to monitor WBC  - Will f/u  blood cx from 1/25 NTD and 1/27 Blood cx: NTD   - ID continue- zosyn and reimaging abdomen in the next week,       # Left leg ischemia   - plan per vascular and primary team  - planning for family meeting with palliative care to decide on how to proceed.  If plans to de-escalate care, likely can be stepped down to regional medical floor, but would need to be discussed with primary attending.     #Suicidal Ideation  #Insomnia   - Pt was placed back on 1:1 observation for suicidality.   - Continuing mirtazapine to 30mg QHS for ongoing depressive sx and insomnia    # Acute Systolic Heart Failure   - Metoprolol succinate 25mg qd - continue    # Atrial Flutter  - s/p DCCV 12/30  - EP recs - uninterrupted AC x 30 days ; January 29th was 30 days post DCCV.   -Continue Lovenox, amiodarone and metoprolol succinate    # Pulmonary embolism (subsegmental RUL)   - After completion of AC for DCCV, would need to continue AC for PE - continuing lovenox at this time  - still minimally tachycardic - potentially presence of PE contributes to tachycardia. Otherwise hemodynamics are stable.     # Bowel ischemia - s/p SMA embolectomy; Small bowel resection  - ileostomy in place  - plan of primary team  - loperamide and diphenoxylate atropine    # Severe protein-calorie malnutrition  # Sacral wound   - needs nutritional optimization  - Wound care per nursing protocol     #Hyponatremia   - Sodium, Serum: 130 mmol/L (01-31-23 @ 05:51) - likely related to poor nutrition, and hypovolumic hyponatremia.  Having loose stools, likely from TPN.   -Continue daily BMP    #DVT ppx -  lovenox for PE     #Diet  - Continue regular and  TPN    #Dispo  -As per primary team

## 2023-01-31 NOTE — BH CONSULTATION LIAISON PROGRESS NOTE - NSBHFUPINTERVALHXFT_PSY_A_CORE
Pt is a 77-year-old Black, cisgender male with prolonged hospitalization, mesenteric ischemia s/p SBR 12/2022, c/b intra-abdominal hematoma, LE gangrene pending possible AKA (pending possible nutritional optimization, currently experiencing malnutrition/malabsorption).    Pt maintained on 1:1 observation overnight after voicing SI to primary team. No acute events overnight, no interim report of SI.    C/L psychology intern met with pt for 45-minute individual psychotherapy session. Upon approach, pt was asleep, but awoke to verbal stimulus. Pt c/o poor appetite, poor night's sleep (believes the medicine for sleep "isn't working"), increase in left LE pain (per RN, pt refused most recent pain medication). Pt was in significant physical pain throughout this interaction and received pain medication from RN during visit. He reported subjective weakness, sweaty forehead (observed by writer), and fast heartrate, which he stated is abnormal relative to his recent baseline. Pt reported "I can't go on like this." Writer assessed for SI, pt reported "I don't have any thoughts," "I can't think straight." Pt reported "I've been trying so hard, but it's not enough" in regard to decreasing his pain and understanding his medical conditions. Writer provided empathic, active, reflective listening, as well as validation of pt's pain. Writer also used motivational interviewing techniques to assess pt's willingness to cooperate with his medical team (e.g., trying to eat, allowing nursing staff to help him by repositioning him and giving him meds). Session ended with pt in more stable mood and behavioral control.    No SI/HI/AH/VH/PI reported by pt during this visit.

## 2023-01-31 NOTE — PROVIDER CONTACT NOTE (OTHER) - SITUATION
patient received oxycodone 5mg, and tylenol 650mg as per providers directions. patient is still at a 10/10 pain level in the left leg and back. tried repositining patient and putting icepacks as well.

## 2023-01-31 NOTE — CHART NOTE - NSCHARTNOTEFT_GEN_A_CORE
C/L psychology intern met with pt for 45-minute individual psychotherapy session. Upon approach, pt was asleep, but readily awoke to verbal stimulus. Pt c/o poor appetite, poor night's sleep (believes the medicine for sleep "isn't working"), increase in left LE pain (per RN, pt refused most recent pain medication). Pt was in significant physical pain throughout this interaction and received pain medication from RN during visit. He reported subjective weakness, sweaty forehead (observed by writer), and fast heartrate, which he stated is abnormal relative to his recent baseline. Pt reported "I can't go on like this." Writer assessed for SI, pt reported "I don't have any thoughts," "I can't think straight." Pt reported "I've been trying so hard, but it's not enough" in regard to decreasing his pain and understanding his medical conditions. Writer provided empathic, active, reflective listening, as well as validation of pt's pain. Writer also used motivational interviewing techniques to assess pt's willingness to cooperate with his medical team (e.g., trying to eat, allowing nursing staff to help him by repositioning him and giving him meds). Session ended with pt in more stable mood and behavioral control.    No SI/HI/AH/VH/PI reported by pt during this visit.

## 2023-02-01 NOTE — PROGRESS NOTE ADULT - ASSESSMENT
77y y/o Male with significant PMHx of IVDU found unresponsive at his nursing home, resolved after Narcan in the field, and brought to [facility]. Found to have PE, atrial flutter, and large LV thrombus on echo. Transferred to [facility] for further management. Pt C/o abdominal pain on 12/10 CTA showing mid SMA with embolus. Abnormal distal small bowel loops and cecum with dilatation and pneumatosis suggesting infarcted bowel. One or two tiny foci of  intrahepatic portal vein pneumatosis. Segmental infarction upper pole left kidney. Now s/p Ex lap, SMA embolectomy, 80cm SBR, abthera vac left in discontinuity (12/11) and transferred to SICU intubated. S/p second look (12/13) and most recently s/p OR for 3rd look, end-to-end anastomosis of remaining bowel, loop ileostomy and abdomen closure (12/15).  LLE Ischemia has not yet fully demarcated, it is clear that the posterior calf (which is needed to heal a BKA flap) is involved and therefore only surgical option available to the patient is an AKA pending medical optimization. LLE gangrene dry at this time.     - LLE unchanged from prior examinations, dry gangrene extending from toes to just below the knee, no wet gangrene observed; even though it can't be ruled out without proper imaging, low suspicion for osteomyelitis  - If clinically feasible from primary team perspective, patient okay to be discharged, no vascular surgery intervention precluding discharge  - Patient can represent at another time when nutritional status is optimized for elective amputation  - Continue daily local wound care with betadine to all skin below the line of demarcation of his left shin and Kerlix. No offloading boot, keep left heel elevated off bed.   - Continue supportive measures  - Rest of care per primary team   - Vascular surgery Team 3C will continue to follow. Please call [phone] with any questions or concerns

## 2023-02-01 NOTE — PROGRESS NOTE ADULT - SUBJECTIVE AND OBJECTIVE BOX
Resting in bed.  Denies any pain at this time.  No other issues to report.  1:1 still at bedside.     Remaining ROS negative       PHYSICAL EXAM:    General: cachetic, no acute distress  HEENT: temporal wasting, anicteric sclera; MMM  Cardiovascular: +S1/S2, mildly tachycardic  Respiratory: diminished breath sounds at bases, no crackles, rales or rhonchi  Gastrointestinal: soft, NT/ND; +BSx4  Extremities: LLE with dressing, no edema in RLE, RLE warm  Neurological: no acute deficits noted, speech is fluent, no facial asymmetry  Psychiatric: reserved and not very talkative    VITAL SIGNS:  Vital Signs Last 24 Hrs  T(C): 36.7 (01 Feb 2023 14:08), Max: 38.7 (31 Jan 2023 21:51)  T(F): 98.1 (01 Feb 2023 14:08), Max: 101.6 (31 Jan 2023 21:51)  HR: 108 (01 Feb 2023 11:52) (106 - 112)  BP: 100/52 (01 Feb 2023 11:52) (100/52 - 129/69)  BP(mean): 69 (01 Feb 2023 11:52) (69 - 93)  RR: 18 (01 Feb 2023 11:52) (17 - 18)  SpO2: 95% (01 Feb 2023 11:52) (95% - 100%)    Parameters below as of 01 Feb 2023 11:52  Patient On (Oxygen Delivery Method): room air          MEDICATIONS:  MEDICATIONS  (STANDING):  aMIOdarone    Tablet 100 milliGRAM(s) Oral every 24 hours  ascorbic acid 500 milliGRAM(s) Oral daily  chlorhexidine 2% Cloths 1 Application(s) Topical <User Schedule>  chlorhexidine 2% Cloths 1 Application(s) Topical <User Schedule>  dextrose 5%. 1000 milliLiter(s) (50 mL/Hr) IV Continuous <Continuous>  dextrose 5%. 1000 milliLiter(s) (100 mL/Hr) IV Continuous <Continuous>  diphenoxylate/atropine 2 Tablet(s) Oral every 8 hours  enoxaparin Injectable 60 milliGRAM(s) SubCutaneous every 12 hours  fat emulsion (Fish Oil and Plant Based) 20% Infusion 0.7764 Gm/kG/Day (20.8 mL/Hr) IV Continuous <Continuous>  glucagon  Injectable 1 milliGRAM(s) IntraMuscular once  influenza  Vaccine (HIGH DOSE) 0.7 milliLiter(s) IntraMuscular once  insulin lispro (ADMELOG) corrective regimen sliding scale   SubCutaneous every 6 hours  loperamide 4 milliGRAM(s) Oral every 8 hours  metoprolol succinate ER 25 milliGRAM(s) Oral daily  mirtazapine 30 milliGRAM(s) Oral at bedtime  multivitamin 1 Tablet(s) Oral daily  opium Tincture 6 milliGRAM(s) Oral every 12 hours  pantoprazole    Tablet 40 milliGRAM(s) Oral two times a day  Parenteral Nutrition - Adult 1 Each (83 mL/Hr) TPN Continuous <Continuous>  Parenteral Nutrition - Adult 1 Each (83 mL/Hr) TPN Continuous <Continuous>  piperacillin/tazobactam IVPB.. 3.375 Gram(s) IV Intermittent every 8 hours  psyllium Powder 1 Packet(s) Oral every 8 hours  sacubitril 24 mG/valsartan 26 mG 1 Tablet(s) Oral two times a day  silver sulfADIAZINE 1% Cream 1 Application(s) Topical daily  spironolactone 25 milliGRAM(s) Oral daily  zinc sulfate 220 milliGRAM(s) Oral daily    MEDICATIONS  (PRN):  acetaminophen     Tablet .. 650 milliGRAM(s) Oral every 6 hours PRN Temp greater or equal to 38C (100.4F), Mild Pain (1 - 3)  dextrose Oral Gel 15 Gram(s) Oral once PRN Blood Glucose LESS THAN 70 milliGRAM(s)/deciliter  melatonin 3 milliGRAM(s) Oral at bedtime PRN Insomnia  ondansetron Injectable 4 milliGRAM(s) IV Push every 6 hours PRN Nausea and/or Vomiting  sodium chloride 0.9% lock flush 10 milliLiter(s) IV Push every 1 hour PRN Pre/post blood products, medications, blood draw, and to maintain line patency      ALLERGIES:  Allergies    No Known Allergies    Intolerances        LABS:                        9.7    11.44 )-----------( 540      ( 01 Feb 2023 05:30 )             30.2     02-01    131<L>  |  99  |  17  ----------------------------<  128<H>  3.3<L>   |  21<L>  |  0.58    Ca    8.8      01 Feb 2023 05:30  Phos  3.2     02-01  Mg     2.0     02-01    TPro  x   /  Alb  2.6<L>  /  TBili  x   /  DBili  x   /  AST  x   /  ALT  x   /  AlkPhos  x   01-31        CAPILLARY BLOOD GLUCOSE      POCT Blood Glucose.: 135 mg/dL (31 Jan 2023 23:34)      RADIOLOGY & ADDITIONAL TESTS: Reviewed.

## 2023-02-01 NOTE — BH CONSULTATION LIAISON PROGRESS NOTE - NSBHFUPINTERVALHXFT_PSY_A_CORE
Pt is a 77-year-old Black, cisgender male with prolonged hospitalization, mesenteric ischemia s/p SBR 12/2022, c/b intra-abdominal hematoma, LE gangrene pending possible AKA (pending possible nutritional optimization, currently experiencing malnutrition/malabsorption). CL consulted for depressed mood and suicidality. Pt maintained on 1:1 observation overnight after voicing SI to primary team. No acute events overnight. Staff reported perceived sadness and patient report of challenge managing his hospitalization, though no new report of suicidal ideation. The writer met with the patient for further evaluation and individual psychotherapy. The patient was lying in bed, seemingly resting with his eyes closed, though easily aroused with verbal prompting. The patient continues to present as depressed, citing feelings of burdensomeness with respect to staff and the hospital system. The writer and patient discussed the relationship between his experiences of pain and his mood, with the patient stating that yesterday was particularly difficult. The writer introduced the idea that his mood may to some extent impact his perception of pain. The patient expressed continued thoughts of death, though denied any thoughts of self-harm, stating that he is safe at this time. The patient was calm, no homicidal ideation or aggression noted or reported, though the patient expressed frustration in response to morning blood work upon waking up, calmly stating that it may cause him to "blow up". The writer engaged the patient in open discussion on his medical care and his preferences, with the writer encouraging the patient to consider this challenge within a problem solving context. Effective communication was discussed. The patient declined playing chess with the writer this morning, though was open to the writer leaving the chess/ board with the patient until this afternoon if the patient decides he would like to play (a preferred activity of his). In reflecting on today's session the patient reported benefiting from having to focus/active engagement. Grossly intact cognitively. No evidence of tyler, psychosis, paranoia, AH/VH, internal stimuli.

## 2023-02-01 NOTE — CHART NOTE - NSCHARTNOTEFT_GEN_A_CORE
Admitting Diagnosis:   Patient is a 77y old  Male who presents with a chief complaint of A flutter (2023 14:33)    PAST MEDICAL & SURGICAL HISTORY:    Current Nutrition Order:  Regular diet with Ensure Enlive TID (1050 kcal, 60g protein, 540mL free H2O)  PPN; 2000 ml TV @ 83 ml/hr providing 180g dex, 80g AA, 50g SMOF, 1432 kcal, 80g pro, 1.23g/kg ABW, 1.92 GIR     PO Intake: Good (%) [   ]  Fair (50-75%) [ x  ] Poor (<25%) [ x  ]- Pt still with high outpt via ostomy, suspect malabsorption.     3 Day Calorie Count  (-):  Day 1: 136kcal,  4g pro  Meeting 8% estimated energy and 4.7% estimated protein needs  Day 2: 1226kcal,  66g pro  Meeting 75% estimated energy and 77% estimated protein needs  Day 3: 535kcal,  20g pro  Meeting 33% estimated energy and 24% estimated protein needs    PO Intake: Good (%) [   ]  Fair (50-75%) [   ] Poor (<25%) [ x  ]- <50% est needs. Please see subjective**    GI Issues:   WDL, Ileostomy (1900 ml/24hrs)  No n/v/d/c  No abd distention or discomfort noted  Inguinal hernia bump noted    Pain:  9/10 severe leg pain noted- on pain regimen    Skin Integrity:  Surgical incision, carla score 12  No pain noted   Pressure ulcer; Stg III sacrum spine, Unstageable upper back  **Ordered for Zinc Sulfate (220 mg daily), Vit C 500mg daily, MVI daily    Labs:       131<L>  |  99  |  17  ----------------------------<  128<H>  3.3<L>   |  21<L>  |  0.58    Ca    8.8      2023 05:30  Phos  3.2       Mg     2.0         TPro  x   /  Alb  2.6<L>  /  TBili  x   /  DBili  x   /  AST  x   /  ALT  x   /  AlkPhos  x       CAPILLARY BLOOD GLUCOSE    POCT Blood Glucose.: 135 mg/dL (2023 23:34)    Medications:  MEDICATIONS  (STANDING):  aMIOdarone    Tablet 100 milliGRAM(s) Oral every 24 hours  ascorbic acid 500 milliGRAM(s) Oral daily  chlorhexidine 2% Cloths 1 Application(s) Topical <User Schedule>  chlorhexidine 2% Cloths 1 Application(s) Topical <User Schedule>  dextrose 5%. 1000 milliLiter(s) (50 mL/Hr) IV Continuous <Continuous>  dextrose 5%. 1000 milliLiter(s) (100 mL/Hr) IV Continuous <Continuous>  diphenoxylate/atropine 2 Tablet(s) Oral every 8 hours  enoxaparin Injectable 60 milliGRAM(s) SubCutaneous every 12 hours  fat emulsion (Fish Oil and Plant Based) 20% Infusion 0.7764 Gm/kG/Day (20.8 mL/Hr) IV Continuous <Continuous>  glucagon  Injectable 1 milliGRAM(s) IntraMuscular once  influenza  Vaccine (HIGH DOSE) 0.7 milliLiter(s) IntraMuscular once  insulin lispro (ADMELOG) corrective regimen sliding scale   SubCutaneous every 6 hours  loperamide 4 milliGRAM(s) Oral every 8 hours  metoprolol succinate ER 25 milliGRAM(s) Oral daily  mirtazapine 30 milliGRAM(s) Oral at bedtime  multivitamin 1 Tablet(s) Oral daily  opium Tincture 6 milliGRAM(s) Oral every 12 hours  pantoprazole    Tablet 40 milliGRAM(s) Oral two times a day  Parenteral Nutrition - Adult 1 Each (83 mL/Hr) TPN Continuous <Continuous>  Parenteral Nutrition - Adult 1 Each (83 mL/Hr) TPN Continuous <Continuous>  piperacillin/tazobactam IVPB.. 3.375 Gram(s) IV Intermittent every 8 hours  psyllium Powder 1 Packet(s) Oral every 8 hours  sacubitril 24 mG/valsartan 26 mG 1 Tablet(s) Oral two times a day  silver sulfADIAZINE 1% Cream 1 Application(s) Topical daily  spironolactone 25 milliGRAM(s) Oral daily  zinc sulfate 220 milliGRAM(s) Oral daily    MEDICATIONS  (PRN):  acetaminophen     Tablet .. 650 milliGRAM(s) Oral every 6 hours PRN Temp greater or equal to 38C (100.4F), Mild Pain (1 - 3)  dextrose Oral Gel 15 Gram(s) Oral once PRN Blood Glucose LESS THAN 70 milliGRAM(s)/deciliter  melatonin 3 milliGRAM(s) Oral at bedtime PRN Insomnia  ondansetron Injectable 4 milliGRAM(s) IV Push every 6 hours PRN Nausea and/or Vomiting  sodium chloride 0.9% lock flush 10 milliLiter(s) IV Push every 1 hour PRN Pre/post blood products, medications, blood draw, and to maintain line patency    Admitting Anthropometrics:    pounds +-10%  Wt 142 pounds BMI 16.4 %IBW=66%     Weight Change:    141.9 pounds - dosing wt    136 pounds - Bedscale wt     **PCM:  >> Reports having 1-2 meals/day + ONS. Per pt claims to have 2 ensure/day and 2 nutrament/day - unclear how accurate this is. ?If pt meeting ~75% EER consistently.  >> Does report wt loss.  pounds x6 months ago. Thinks he now is 135 pounds which is consistent with bedscale wt obtained during initial visit by  pounds. Suggest wt loss of 49 pounds/26% body wt loss.  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     Estimated energy needs:  Current body wt for EER based on clinician judgment. Adjust for age, malnutrition, EF, S/p OR, Pressure ulcer; fluids per team  25-30kcal/k-1950kcal/day   1.3-1.5gm/k-98gm prot/day   **Rec upper end of needs     Subjective:  77M with PMHx of IVDU found unresponsive at his nursing home, resolved after Narcan in the field and brought to [facility]. Found to have PE, atrial flutter, and large LV thrombus on echo. Transferred to [facility] for further management. Pt c/o abdominal pain on 12/10 CTA showing mid SMA with embolus. Abnormal distal small bowel loops and cecum with dilatation and pneumatosis suggesting infarcted bowel. One or two tiny foci of intrahepatic portal vein pneumatosis. Segmental infarction upper pole left kidney. Now s/p Ex lap, SMA embolectomy, 80cm SBR, abthera vac left in discontinuity () and transferred to SICU intubated. S/p second look () and most recently s/p OR for 3rd look, end-to-end anastomosis of remaining bowel, loop ileostomy and abdomen closure (12/15). Transferred to CCU on  for cardiac optimization and now transferred back to SICU  for bleeding hemodynamic instability. Echo  shows EF 10-15% with overall hypokinesis. CTA performed demonstrating large hematoma in R abdomen, new hemoperitoneum, and bilateral pleural effusions. Remains in SICU, now extubated with still with limb ischemia to LLE pending amputation and AC held given BRBPR and hematoma; noted pt agreeable for AKA when feasible - AKA currently Pending Cards. Pt s/p DCCV on  (negative LORENZO on ) with successful conversion to NSR. Planning for AKA with vascular surgery once nutrition status is optimize. Team placed PICC 1/10 and initiated supplemental TPN now weaned off with constant encouragement of PO intake. Per CM note. medically ready for discharge with acceptance to [facility]. Plan to place central line when feasible and restart TPN- please see recs below. Disc with team.     On assessment, pt resting in bed. Currently pt is on regular diet with Ensure Enlive TID (1050 kcal, 60g protein, 540mL free H2O). Overall appetite remains very poor meeting <50% est needs. Pt currently on PPN supplementing PO intake. Plan to restart TPN when feasible, please see recs below pending PICC placement per disc with RN this am. RD cont to encourage adequate PO intake as tolerated. Cont to have family provide pt with his own food preferences- dislikes most foods on menu provided. RD to follow.     Prior PES: Malnutrition Severe in Chronic state RT presumed intake<EER AEB NFPE, wt loss, PO intake  >>on going  Goal: Pt will meet at least 75% of nutrient needs via feasible route / Show no further s/s of malnutrition    Recommendations:  1. Cont with regular diet with Ensure Enlive TID (1050 kcal, 60g protein, 540mL free H2O)  2. Due to persistent malabsorption, If team able to place PICC and start TPN, would recommend meeting 100% of est needs until outpt via ostomy improves. Recommend; 275g Dex, 90g AA, 50g SMOF lipids to provide 1795 kcal, 90g pro, GIR 2.93, 1.38 g/kg pro ABW. RD to follow.  >> Cont to trend TG weekly  3. Recommend addition of MVI daily  4. Monitor Skin, Wts daily, GOC. Pain/GI per team.   >>Cont with imodium and metamucil per team  5. Labs: monitor BMP, CBC, glucose, lytes - Replete PRN, trend renal indices, LFts, POCT.  6. RD to remain available for additional Recs.    **Disc with team    Education:  Cont to encourage adequate PO intake to prevent further s/sx of malnutrition/ healthy weight gain    Risk Level: High [X   ] Moderate [   ] Low [   ].

## 2023-02-01 NOTE — BH CONSULTATION LIAISON PROGRESS NOTE - NSBHASSESSMENTFT_PSY_ALL_CORE
Patient is a 77 year old male with history of substance use and no significant past medical history (poor medical follow up, last PCP visit 20 years prior to admission), presenting with unresponsiveness from nursing home to [facility], found to be in Aflutter w RVR with subsegmental PE RUL, transferred to [facility], found to have LV thrombus and worsening HF, EF 10-15%. 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 Course further complicated by LLE pulselessness and ulcerations that will ultimately require BKA. Psychiatry consulted due to chronic depressed mood in the hospital and suicidality. Per staff pt verbalizes Si without intent when he experiences high physical distress (pain). During the weekend, pt was put back on 1:1 observation. Patient continues on 1:1. No new report of suicidal ideation per staff, though patient depression persists. Continued passive suicidal ideation in the context of his health, though patient denied any active thoughts of self-harm. No evidence of aggression or homicidality, though patient acknowledges growing frustration with morning blood work. Patient was calm, engaged, denying thoughts of self-harm, not manic, not psychotic, not responding to internal stimuli. At the present time the patient does not appear to be at imminent risk of harm to self or others, though would benefit from continued 1:1 observation.     :::Recommendations:::  -continue for now 1:1  -currently followed by the Palliative team   -Encourage sleep hygiene and limiting sleep during the day  -Delirium precautions  -Patient enjoys playing chess; chess/ board will be kept in patient room until 3pm today; in general, can be retrieved from Patient Experience (4th floor [facility]). Engagement in preferred activities such as chess may improve mood, foster confidence, and serve as effective pain distration.  -Patient encouraged to verbalize his needs to staff, particularly with respect to morning blood work (as medically appropriate, patient would like brief period of time between wake-up and blood work, including water and coffee)  - While initially irritable at times, patient's mood appears to benefit from socialization.      -Psychology to follow the pt daily for supportive psychotherapy  -Contact CL service with any questions/concerns; patient understands that he can request psychiatric services

## 2023-02-01 NOTE — PROVIDER CONTACT NOTE (OTHER) - ACTION/TREATMENT ORDERED:
, Provider advised to hold PPN and give him the LR bolus of 700, and put him back on PPN when bolus is finished.

## 2023-02-01 NOTE — PROVIDER CONTACT NOTE (OTHER) - SITUATION
patient just received another IV acesss because previous one catheter was not patent. Patient has PPN going through one acess, and zoysn going through the second one. patient refusing to attempt a

## 2023-02-01 NOTE — BH CONSULTATION LIAISON PROGRESS NOTE - NSBHATTESTCOMMENTATTENDFT_PSY_A_CORE
Patient is a 77 year old male with history of substance use and no significant past medical history (poor medical follow up, last PCP visit 20 years prior to admission), presenting with unresponsiveness from nursing home to [facility], found to be in Aflutter w RVR with subsegmental PE RUL, transferred to [facility], found to have LV thrombus and worsening HF, EF 10-15%. 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 Course further complicated by LLE pulselessness and ulcerations that will ultimately require BKA. Psychiatry consulted due to chronic depressed mood in the hospital and suicidality. Per staff pt verbalizes Si without intent, when he experiences high physical distress (pain). During the weekend, pt was put back on 1:1 observation.  On assessment today, pt presents in distress due to pain after having PT. He remains with chronic low mood, similar with other evaluations, due to hopelessness related to the progression of his medical complications, poorly controlled pain and impact on his independence. As with other evaluations in the past, patient reports SI but without intent or means. Patient's suicidality is conditional on his current physical state.  Patient does not appear at this time cognitively impaired, no AVH/Pi were observed.  Plan:  -continue for now 1:1; will re-assess the tomorrow   -currently followed by the Palliative team   -suggest optimizing pt's pain treatment; pt has significant history of substance use which makes him more likely to have high tolerance to pain agents  -continue mirtazapine 30mg QHS for ongoing depressive sx and insomnia, will observe for response  -Encourage sleep hygiene and limiting sleep during the day  -Delirium precautions  -Psychology to follow the pt daily for supportive psychotherapy

## 2023-02-01 NOTE — PROGRESS NOTE ADULT - ASSESSMENT
77M first presented to [facility] from [facility] due to unresponsiveness (responded to Narcan). At [facility], found to have subsegmental pulmonary embolism in RUL, rapid atrial flutter with severely reduced LVEF with LV thrombus. Transferred to [facility] on 12/7/22 for LORENZO and possible ablation. At [facility], was found to have left leg ischemia (left leg arterial thrombus on LE duplex on 12/8). Was being considered for rhythm control when he developed abdominal pain, CTA (12/10/22) showed embolus in SMA, bowel infarct, requiring ex-lap on 12/11 with SMA embolectomy, 80cm small bowel resection; On 12/13, underwent 2nd look laparotomy, with 80cm small bowel resection, closure with abthera. On 12/15, underwent 3rd look laparotomy, end-to-end anastomosis of remaining bowel, loop ileostomy and abdominal closure. Now extubated, on [facility] floor. Eventually underwent LORENZO/DCCV on 12/30/22, now in sinus rhythm. Currently being evaluated for possible above knee amputation for LLE ischemia (possibly deferring till next hospitalization, after optimization of nutritional status).      #Fever  #Leukocytosis   -Pt with CT abd/pelvis which showed fluid collection. Pt was seen by IR re: fluid collection however the pt's imaging was reviewed by IR and it was determined that the fluid collection is not amenable to percutaneous drainage d/t bowel obstructing a safe window.  - Continue to monitor WBC  - continues to be intermittently febrile - send cultures  - ID is following  - ID continue- zosyn and reimaging abdomen in the next week,     # Left leg ischemia   - plan per vascular and primary team  - planning for family meeting with palliative care to decide on how to proceed to happen today.   If plans to de-escalate care, likely can be stepped down to regional medical floor, but would need to be discussed with primary attending.     #Suicidal Ideation  #Insomnia   - Pt was placed back on 1:1 observation for suicidality.   - Continuing mirtazapine to 30mg QHS for ongoing depressive sx and insomnia    # Acute Systolic Heart Failure   - Metoprolol succinate 25mg qd - continue    # Atrial Flutter  - s/p DCCV 12/30  - EP recs - uninterrupted AC x 30 days ; January 29th was 30 days post DCCV.   -Continue Lovenox, amiodarone and metoprolol succinate    # Pulmonary embolism (subsegmental RUL)   - After completion of AC for DCCV, would need to continue AC for PE - continuing lovenox at this time  - still minimally tachycardic - potentially presence of PE contributes to tachycardia. Otherwise hemodynamics are stable.     # Bowel ischemia - s/p SMA embolectomy; Small bowel resection  - ileostomy in place  - plan of primary team  - loperamide and diphenoxylate atropine    # Severe protein-calorie malnutrition  # Sacral wound   - needs nutritional optimization  - Wound care per nursing protocol     #Hyponatremia   - Sodium, Serum: 130 mmol/L (01-31-23 @ 05:51) - likely related to poor nutrition, and hypovolumic hyponatremia.  Having loose stools, likely from TPN.   -Continue daily BMP    #DVT ppx -  lovenox for PE     #Diet  - Continue regular and  TPN    #Dispo  -As per primary team

## 2023-02-01 NOTE — PROGRESS NOTE ADULT - SUBJECTIVE AND OBJECTIVE BOX
SUBJECTIVE:  NAEON. L leg dressed w/ dry gangrene. Denies new extremity pain. Tolerating diet and TPN.    MEDICATIONS  (STANDING):  aMIOdarone    Tablet 100 milliGRAM(s) Oral every 24 hours  ascorbic acid 500 milliGRAM(s) Oral daily  chlorhexidine 2% Cloths 1 Application(s) Topical <User Schedule>  chlorhexidine 2% Cloths 1 Application(s) Topical <User Schedule>  dextrose 5%. 1000 milliLiter(s) (50 mL/Hr) IV Continuous <Continuous>  dextrose 5%. 1000 milliLiter(s) (100 mL/Hr) IV Continuous <Continuous>  diphenoxylate/atropine 2 Tablet(s) Oral every 8 hours  enoxaparin Injectable 60 milliGRAM(s) SubCutaneous every 12 hours  fat emulsion (Fish Oil and Plant Based) 20% Infusion 0.7764 Gm/kG/Day (20.8 mL/Hr) IV Continuous <Continuous>  glucagon  Injectable 1 milliGRAM(s) IntraMuscular once  influenza  Vaccine (HIGH DOSE) 0.7 milliLiter(s) IntraMuscular once  insulin lispro (ADMELOG) corrective regimen sliding scale   SubCutaneous every 6 hours  lactated ringers Bolus 300 milliLiter(s) IV Bolus once  loperamide 4 milliGRAM(s) Oral every 8 hours  metoprolol succinate ER 25 milliGRAM(s) Oral daily  mirtazapine 30 milliGRAM(s) Oral at bedtime  multivitamin 1 Tablet(s) Oral daily  opium Tincture 6 milliGRAM(s) Oral every 12 hours  pantoprazole    Tablet 40 milliGRAM(s) Oral two times a day  Parenteral Nutrition - Adult 1 Each (83 mL/Hr) TPN Continuous <Continuous>  Parenteral Nutrition - Adult 1 Each (83 mL/Hr) TPN Continuous <Continuous>  piperacillin/tazobactam IVPB.. 3.375 Gram(s) IV Intermittent every 8 hours  psyllium Powder 1 Packet(s) Oral every 8 hours  sacubitril 24 mG/valsartan 26 mG 1 Tablet(s) Oral two times a day  silver sulfADIAZINE 1% Cream 1 Application(s) Topical daily  spironolactone 25 milliGRAM(s) Oral daily  zinc sulfate 220 milliGRAM(s) Oral daily    MEDICATIONS  (PRN):  acetaminophen     Tablet .. 650 milliGRAM(s) Oral every 6 hours PRN Temp greater or equal to 38C (100.4F), Mild Pain (1 - 3)  dextrose Oral Gel 15 Gram(s) Oral once PRN Blood Glucose LESS THAN 70 milliGRAM(s)/deciliter  melatonin 3 milliGRAM(s) Oral at bedtime PRN Insomnia  ondansetron Injectable 4 milliGRAM(s) IV Push every 6 hours PRN Nausea and/or Vomiting  sodium chloride 0.9% lock flush 10 milliLiter(s) IV Push every 1 hour PRN Pre/post blood products, medications, blood draw, and to maintain line patency      Vital Signs Last 24 Hrs  T(C): 38.1 (01 Feb 2023 09:06), Max: 38.7 (31 Jan 2023 21:51)  T(F): 100.5 (01 Feb 2023 09:06), Max: 101.6 (31 Jan 2023 21:51)  HR: 112 (01 Feb 2023 08:21) (106 - 112)  BP: 120/62 (01 Feb 2023 08:21) (109/58 - 143/63)  BP(mean): 81 (01 Feb 2023 08:21) (76 - 93)  RR: 18 (01 Feb 2023 08:21) (17 - 18)  SpO2: 95% (01 Feb 2023 08:21) (95% - 100%)    Parameters below as of 01 Feb 2023 08:21  Patient On (Oxygen Delivery Method): room air        Physical Exam:  General: NAD, resting comfortably in bed  Pulmonary: Nonlabored breathing, no respiratory distress  Cardiovascular: NSR  Abdominal: soft, NT/ND  Extremities: WWP, normal strength, dry gangrene to L knee  Neuro: A/O x 3, CNs II-XII grossly intact, no focal deficits    I&O's Summary    31 Jan 2023 07:01  -  01 Feb 2023 07:00  --------------------------------------------------------  IN: 2439.3 mL / OUT: 3900 mL / NET: -1460.7 mL    01 Feb 2023 07:01  -  01 Feb 2023 11:49  --------------------------------------------------------  IN: 128.8 mL / OUT: 200 mL / NET: -71.2 mL        LABS:                        9.7    11.44 )-----------( 540      ( 01 Feb 2023 05:30 )             30.2     02-01    131<L>  |  99  |  17  ----------------------------<  128<H>  3.3<L>   |  21<L>  |  0.58    Ca    8.8      01 Feb 2023 05:30  Phos  3.2     02-01  Mg     2.0     02-01    TPro  x   /  Alb  2.6<L>  /  TBili  x   /  DBili  x   /  AST  x   /  ALT  x   /  AlkPhos  x   01-31        CAPILLARY BLOOD GLUCOSE      POCT Blood Glucose.: 135 mg/dL (31 Jan 2023 23:34)    LIVER FUNCTIONS - ( 31 Jan 2023 05:51 )  Alb: 2.6 g/dL / Pro: x     / ALK PHOS: x     / ALT: x     / AST: x     / GGT: x             RADIOLOGY & ADDITIONAL STUDIES:

## 2023-02-02 NOTE — BH CONSULTATION LIAISON PROGRESS NOTE - ORIENTATION
Pt believed that today was 2/3 and that yesterday was his birthday (today is [birth year]2 and it is his birthday).

## 2023-02-02 NOTE — BH CONSULTATION LIAISON PROGRESS NOTE - NSBHATTESTCOMMENTATTENDFT_PSY_A_CORE
Patient is a 78 year old male with history of substance use and no significant past medical history (poor medical follow up, last PCP visit 20 years prior to admission), presenting with unresponsiveness from nursing home to [facility], found to be in Aflutter w RVR with subsegmental PE RUL, transferred to [facility], found to have LV thrombus and worsening HF, EF 10-15%. 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 Course further complicated by LLE pulselessness and ulcerations that will ultimately require BKA. Psychiatry consulted due to chronic depressed mood in the hospital and suicidality. Per staff pt verbalizes Si without intent, when he experiences high physical distress (pain). Upon re-assessment today patient presents with similarly low mood and hopelessness about his medical conditions, slightly more fatigued and with some confusion (disoriented to the day of the week). He continues to endorse feeling motivated to engage in treatment and work on improving his physical and nutritional state and denies active SI. Patient's past statements of wanting to die appear to be triggered by moments of high distress due to pain and lack of improvement in his physical state and not actual suicidal statements.    Plan:  -patient does not require 1:1 observation for acute suicidality; consider nursing enhanced supervision   -patient to be followed daily by the Psychology team for supportive psychotherapy    -continue mirtazapine 30mg QHS for ongoing depressive sx and insomnia, will observe for response  -Encourage sleep hygiene and limiting sleep during the day    -Delirium precautions  -Psychology to follow the pt daily for supportive psychotherapy Patient is a 78 year old male with history of substance use and no significant past medical history (poor medical follow up, last PCP visit 20 years prior to admission), presenting with unresponsiveness from nursing home to [facility], found to be in Aflutter w RVR with subsegmental PE RUL, transferred to [facility], found to have LV thrombus and worsening HF, EF 10-15%. 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 Course further complicated by LLE pulselessness and ulcerations that will ultimately require BKA. Psychiatry consulted due to chronic depressed mood in the hospital and suicidality. Per staff pt verbalizes Si without intent, when he experiences high physical distress (pain). Upon re-assessment today patient presents with similarly low mood and hopelessness about his medical conditions, slightly more fatigued and with some confusion (disoriented to the day of the week). He continues to endorse feeling motivated to engage in treatment and work on improving his physical and nutritional state and denies active SI. Patient's past statements of wanting to die appear to be triggered by moments of high distress due to pain and frustration with lack of improvement in his physical state and not actual suicidal statements.    Plan:  -patient does not require 1:1 observation for acute suicidality; consider nursing enhanced supervision   -patient to be followed daily by the Psychology team for supportive psychotherapy    -continue mirtazapine 30mg QHS for ongoing depressive sx and insomnia, will observe for response  -Encourage sleep hygiene and limiting sleep during the day    -Delirium precautions  -Psychology to follow the pt daily for supportive psychotherapy

## 2023-02-02 NOTE — PROGRESS NOTE ADULT - SUBJECTIVE AND OBJECTIVE BOX
SUBJECTIVE:  Patient seen and examined on AM rounds with chief resident. Febrile again overnight, TMax 100.6. This morning, patient is feeling well. He denies abdominal pain, nausea, vomiting, fever, and chills. Currently tolerating regular diet with supplements, as well as PPN.     MEDICATIONS  (STANDING):  aMIOdarone    Tablet 100 milliGRAM(s) Oral every 24 hours  ascorbic acid 500 milliGRAM(s) Oral daily  chlorhexidine 2% Cloths 1 Application(s) Topical <User Schedule>  chlorhexidine 2% Cloths 1 Application(s) Topical <User Schedule>  dextrose 5%. 1000 milliLiter(s) (50 mL/Hr) IV Continuous <Continuous>  dextrose 5%. 1000 milliLiter(s) (100 mL/Hr) IV Continuous <Continuous>  diphenoxylate/atropine 2 Tablet(s) Oral every 8 hours  enoxaparin Injectable 60 milliGRAM(s) SubCutaneous every 12 hours  fat emulsion (Fish Oil and Plant Based) 20% Infusion 0.7764 Gm/kG/Day (20.8 mL/Hr) IV Continuous <Continuous>  glucagon  Injectable 1 milliGRAM(s) IntraMuscular once  influenza  Vaccine (HIGH DOSE) 0.7 milliLiter(s) IntraMuscular once  insulin lispro (ADMELOG) corrective regimen sliding scale   SubCutaneous every 6 hours  loperamide 4 milliGRAM(s) Oral every 8 hours  metoprolol succinate ER 25 milliGRAM(s) Oral daily  mirtazapine 30 milliGRAM(s) Oral at bedtime  multivitamin 1 Tablet(s) Oral daily  opium Tincture 6 milliGRAM(s) Oral every 12 hours  pantoprazole    Tablet 40 milliGRAM(s) Oral two times a day  Parenteral Nutrition - Adult 1 Each (83 mL/Hr) TPN Continuous <Continuous>  piperacillin/tazobactam IVPB.. 3.375 Gram(s) IV Intermittent every 8 hours  psyllium Powder 1 Packet(s) Oral every 8 hours  sacubitril 24 mG/valsartan 26 mG 1 Tablet(s) Oral two times a day  silver sulfADIAZINE 1% Cream 1 Application(s) Topical daily  spironolactone 25 milliGRAM(s) Oral daily  zinc sulfate 220 milliGRAM(s) Oral daily    MEDICATIONS  (PRN):  acetaminophen     Tablet .. 650 milliGRAM(s) Oral every 6 hours PRN Temp greater or equal to 38C (100.4F), Mild Pain (1 - 3)  dextrose Oral Gel 15 Gram(s) Oral once PRN Blood Glucose LESS THAN 70 milliGRAM(s)/deciliter  melatonin 3 milliGRAM(s) Oral at bedtime PRN Insomnia  ondansetron Injectable 4 milliGRAM(s) IV Push every 6 hours PRN Nausea and/or Vomiting  sodium chloride 0.9% lock flush 10 milliLiter(s) IV Push every 1 hour PRN Pre/post blood products, medications, blood draw, and to maintain line patency      Vital Signs Last 24 Hrs  T(C): 37.1 (02 Feb 2023 04:51), Max: 38.1 (01 Feb 2023 22:05)  T(F): 98.8 (02 Feb 2023 04:51), Max: 100.6 (01 Feb 2023 22:05)  HR: 108 (02 Feb 2023 08:39) (104 - 114)  BP: 101/60 (02 Feb 2023 08:39) (84/51 - 110/65)  BP(mean): 74 (02 Feb 2023 08:39) (63 - 81)  RR: 18 (02 Feb 2023 08:39) (15 - 18)  SpO2: 99% (02 Feb 2023 08:39) (95% - 99%)    Parameters below as of 02 Feb 2023 08:39  Patient On (Oxygen Delivery Method): room air    Physical Exam:  General: NAD, resting comfortably in bed  Pulmonary: Nonlabored breathing, no respiratory distress  Cardiovascular: NSR  Abdominal: soft, NT/ND, ostomy in place with output  Extremities: WWP, normal strength    I&O's Summary    01 Feb 2023 07:01  -  02 Feb 2023 07:00  --------------------------------------------------------  IN: 2668.9 mL / OUT: 5500 mL / NET: -2831.1 mL    02 Feb 2023 07:01  -  02 Feb 2023 09:07  --------------------------------------------------------  IN: 366 mL / OUT: 0 mL / NET: 366 mL        LABS:                        9.7    11.44 )-----------( 540      ( 01 Feb 2023 05:30 )             30.2     02-01    131<L>  |  99  |  17  ----------------------------<  128<H>  3.3<L>   |  21<L>  |  0.58    Ca    8.8      01 Feb 2023 05:30  Phos  3.2     02-01  Mg     2.0     02-01          CAPILLARY BLOOD GLUCOSE            RADIOLOGY & ADDITIONAL STUDIES:

## 2023-02-02 NOTE — BH CONSULTATION LIAISON PROGRESS NOTE - NSBHASSESSMENTFT_PSY_ALL_CORE
Patient is a 78 year old male with history of substance use and no significant past medical history (poor medical follow up, last PCP visit 20 years prior to admission), presenting with unresponsiveness from nursing home to [facility], found to be in Aflutter w RVR with subsegmental PE RUL, transferred to [facility], found to have LV thrombus and worsening HF, EF 10-15%. 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 Course further complicated by LLE pulselessness and ulcerations that will ultimately require BKA. Psychiatry consulted due to chronic depressed mood in the hospital and suicidality. Per staff pt verbalizes Si without intent, when he experiences high physical distress (pain). Upon re-assessment today patient presents with similarly low mood and hopelessness about his medical conditions, slightly more fatigued and with some confusion (disoriented to the day of the week). He continues to endorse feeling motivated to engage in treatment and work on improving his physical and nutritional state and denies active SI. Patient's past statements of wanting to die appear to be triggered by moments of high distress due to pain and lack of improvement in his physical state and not actual suicidal statements.    Plan:  -patient does not require 1:1 observation for acute suicidality; consider nursing enhanced supervision   -patient to be followed daily by the Psychology team for supportive psychotherapy    -continue mirtazapine 30mg QHS for ongoing depressive sx and insomnia, will observe for response  -Encourage sleep hygiene and limiting sleep during the day    -Delirium precautions  -Psychology to follow the pt daily for supportive psychotherapy Patient is a 78 year old male with history of substance use and no significant past medical history (poor medical follow up, last PCP visit 20 years prior to admission), presenting with unresponsiveness from nursing home to [facility], found to be in Aflutter w RVR with subsegmental PE RUL, transferred to [facility], found to have LV thrombus and worsening HF, EF 10-15%. 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 Course further complicated by LLE pulselessness and ulcerations that will ultimately require BKA. Psychiatry consulted due to chronic depressed mood in the hospital and suicidality. Per staff pt verbalizes Si without intent, when he experiences high physical distress (pain).   Upon re-assessment today, patient presents with similarly low mood and hopelessness about his medical conditions/future/independence; He presents slightly more fatigued and with some confusion (disoriented to the day of the week). He continues to endorse feeling motivated to engage in treatment and work on improving his physical and nutritional state and denies active SI. Patient's past statements of wanting to die, appear to be triggered by moments of high distress due to pain and frustration with lack of improvement in his physical state and not actual suicidal statements.    Plan:  -patient does not require 1:1 observation for acute suicidality; consider nursing enhanced supervision   -patient to be followed daily by the Psychology team for supportive psychotherapy    -continue mirtazapine 30mg QHS for ongoing depressive sx and insomnia, will observe for response  -Encourage sleep hygiene and limiting sleep during the day    -Delirium precautions  -Psychology to follow the pt daily for supportive psychotherapy

## 2023-02-02 NOTE — PROGRESS NOTE ADULT - SUBJECTIVE AND OBJECTIVE BOX
SUBJECTIVE:  Doing well this AM. NAEON. Denies cp/sob. Pain is well controlled. Tolerating diet. No fevers or purulence from leg.    MEDICATIONS  (STANDING):  aMIOdarone    Tablet 100 milliGRAM(s) Oral every 24 hours  ascorbic acid 500 milliGRAM(s) Oral daily  chlorhexidine 2% Cloths 1 Application(s) Topical <User Schedule>  chlorhexidine 2% Cloths 1 Application(s) Topical <User Schedule>  dextrose 5%. 1000 milliLiter(s) (100 mL/Hr) IV Continuous <Continuous>  dextrose 5%. 1000 milliLiter(s) (50 mL/Hr) IV Continuous <Continuous>  diphenoxylate/atropine 2 Tablet(s) Oral every 8 hours  enoxaparin Injectable 60 milliGRAM(s) SubCutaneous every 12 hours  fat emulsion (Fish Oil and Plant Based) 20% Infusion 0.7764 Gm/kG/Day (20.8 mL/Hr) IV Continuous <Continuous>  glucagon  Injectable 1 milliGRAM(s) IntraMuscular once  influenza  Vaccine (HIGH DOSE) 0.7 milliLiter(s) IntraMuscular once  insulin lispro (ADMELOG) corrective regimen sliding scale   SubCutaneous every 6 hours  loperamide 4 milliGRAM(s) Oral every 8 hours  metoprolol succinate ER 25 milliGRAM(s) Oral daily  mirtazapine 30 milliGRAM(s) Oral at bedtime  multivitamin 1 Tablet(s) Oral daily  opium Tincture 6 milliGRAM(s) Oral every 12 hours  pantoprazole    Tablet 40 milliGRAM(s) Oral two times a day  Parenteral Nutrition - Adult 1 Each (83 mL/Hr) TPN Continuous <Continuous>  piperacillin/tazobactam IVPB.. 3.375 Gram(s) IV Intermittent every 8 hours  psyllium Powder 1 Packet(s) Oral every 8 hours  sacubitril 24 mG/valsartan 26 mG 1 Tablet(s) Oral two times a day  silver sulfADIAZINE 1% Cream 1 Application(s) Topical daily  spironolactone 25 milliGRAM(s) Oral daily  zinc sulfate 220 milliGRAM(s) Oral daily    MEDICATIONS  (PRN):  acetaminophen     Tablet .. 650 milliGRAM(s) Oral every 6 hours PRN Temp greater or equal to 38C (100.4F), Mild Pain (1 - 3)  dextrose Oral Gel 15 Gram(s) Oral once PRN Blood Glucose LESS THAN 70 milliGRAM(s)/deciliter  melatonin 3 milliGRAM(s) Oral at bedtime PRN Insomnia  ondansetron Injectable 4 milliGRAM(s) IV Push every 6 hours PRN Nausea and/or Vomiting  sodium chloride 0.9% lock flush 10 milliLiter(s) IV Push every 1 hour PRN Pre/post blood products, medications, blood draw, and to maintain line patency      Vital Signs Last 24 Hrs  T(C): 37.1 (02 Feb 2023 04:51), Max: 38.1 (01 Feb 2023 09:06)  T(F): 98.8 (02 Feb 2023 04:51), Max: 100.6 (01 Feb 2023 22:05)  HR: 110 (02 Feb 2023 03:41) (104 - 114)  BP: 110/65 (02 Feb 2023 03:41) (84/51 - 110/65)  BP(mean): 81 (02 Feb 2023 03:41) (63 - 81)  RR: 15 (02 Feb 2023 03:41) (15 - 18)  SpO2: 99% (02 Feb 2023 03:41) (95% - 99%)    Parameters below as of 02 Feb 2023 03:41  Patient On (Oxygen Delivery Method): room air      Physical Exam:  General: NAD, resting comfortably in bed  Pulmonary: Nonlabored breathing, no respiratory distress  Cardiovascular: NSR  Abdominal: soft, NT/ND  Extremities: WWP, normal strength, dry gangrene to below knee LLE  Neuro: A/O x 3, CNs II-XII grossly intact, no focal deficits    I&O's Summary    01 Feb 2023 07:01  -  02 Feb 2023 07:00  --------------------------------------------------------  IN: 2668.9 mL / OUT: 5500 mL / NET: -2831.1 mL        LABS:                        9.7    11.44 )-----------( 540      ( 01 Feb 2023 05:30 )             30.2     02-01    131<L>  |  99  |  17  ----------------------------<  128<H>  3.3<L>   |  21<L>  |  0.58    Ca    8.8      01 Feb 2023 05:30  Phos  3.2     02-01  Mg     2.0     02-01

## 2023-02-02 NOTE — BH CONSULTATION LIAISON PROGRESS NOTE - NSBHFUPINTERVALHXFT_PSY_A_CORE
Pt is a 78-year-old Black, cisgender male with prolonged hospitalization, mesenteric ischemia s/p SBR 12/2022, c/b intra-abdominal hematoma, LE gangrene pending possible AKA (pending possible nutritional optimization, currently experiencing malnutrition/malabsorption).    Pt maintained on 1:1 observation for SI.    C/L psychology intern met with pt for 60-minute individual psychotherapy session. Upon approach, pt was awake, low energy. Pt denied all physical pain. Session focused on reconciling pt's self-described "inability to change" re: increasing his calories and his self-report of wanting to live. Pt stated he "doesn't want to hear [about] end of life care," and that he "does not want to die." Writer assessed for SI. Pt reported that in moments when his physical pain is overwhelming, he experiences SI. He reported thinking about "shooting [himself] with a gun." Pt reported that he would be able to obtain a gun while in the hospital; he stated he has someone who would bring it for him. Pt went on to say he would "shoot [himself] in the left leg, right above the kneecap" since his lower leg is "no good anyway," and this would force the medical team to do the amputation that as of right now is delayed due to pt's malabsorption/malnutrition. Writer provided empathic, active, reflective listening, as well as validation of pt's internal conflict around wanting to live but feeling he is unable to take the necessary steps toward recovery. Writer used motivational interviewing techniques to assess pt's willingness to eat the hospital food which pt calls "synthetic food"; pt reported he is "very motivated," "I'll do whatever they tell me I need to do." Pt requested for the PCA to give him some of the store-bought food his daughter/family members brought for him. Session ended with pt in more stable mood and behavioral control.    No SI/HI/AH/VH/PI reported by pt during this visit.    Of note, pt's recent memory and/or attention seems to be somewhat impaired compared to previous sessions. He believed his birthday was yesterday (it is today). Pt did not recall speaking with [employer] with his sister, but recalls his sister telling him he needs to increase his food intake. He also did not recall who writer is/dept association, despite writer meeting with pt regularly for psychotherapy sessions during this hospitalization.

## 2023-02-02 NOTE — PROGRESS NOTE ADULT - ASSESSMENT
77M with PMHx of IVDU found unresponsive at his nursing home, resolved after Narcan in the field and brought to [facility]. Found to have PE, atrial flutter, and large LV thrombus on echo. Transferred to [facility] for further management. Pt c/o abdominal pain on 12/10 CTA showing mid SMA with embolus. Abnormal distal small bowel loops and cecum with dilatation and pneumatosis suggesting infarcted bowel. One or two tiny foci of intrahepatic portal vein pneumatosis. Segmental infarction upper pole left kidney. Now s/p Ex lap, SMA embolectomy, 80cm SBR, abthera vac left in discontinuity (12/11) and transferred to SICU intubated. S/p second look (12/13) and most recently s/p OR for 3rd look, end-to-end anastomosis of remaining bowel, loop ileostomy and abdomen closure (12/15). LLE ischemia, AKA delayed by nutritional optimization.    Regular diet w/ ensures  PPN  Zosyn  f/u blood cxs (NGTD)  Pain/nausea control  A flutter now s/p cardioversion, amio 100 QD, toprol XL 25 QD  LV thrombus w LLE limb ischemia  Lovenox  OOBA/IS  AKA likely outpatient  AM labs  Dispo: optimize nutrition status, possible OR next week for ileostomy reversal

## 2023-02-02 NOTE — PROGRESS NOTE ADULT - ASSESSMENT
77y y/o Male with significant PMHx of IVDU found unresponsive at his nursing home, resolved after Narcan in the field, and brought to [facility]. Found to have PE, atrial flutter, and large LV thrombus on echo. Transferred to [facility] for further management. Pt C/o abdominal pain on 12/10 CTA showing mid SMA with embolus. Abnormal distal small bowel loops and cecum with dilatation and pneumatosis suggesting infarcted bowel. One or two tiny foci of  intrahepatic portal vein pneumatosis. Segmental infarction upper pole left kidney. Now s/p Ex lap, SMA embolectomy, 80cm SBR, abthera vac left in discontinuity (12/11) and transferred to SICU intubated. S/p second look (12/13) and most recently s/p OR for 3rd look, end-to-end anastomosis of remaining bowel, loop ileostomy and abdomen closure (12/15).  LLE Ischemia has not yet fully demarcated, it is clear that the posterior calf (which is needed to heal a BKA flap) is involved and therefore only surgical option available to the patient is an AKA pending medical optimization. LLE gangrene dry at this time.     - LLE unchanged from prior examinations, dry gangrene extending from toes to just below the knee, no wet gangrene observed  - No vascular surgery intervention precluding discharge  - Patient can follow up when nutritional status is optimized for elective amputation  - Continue daily local wound care with betadine to all skin below the line of demarcation of his left shin and Kerlix. No offloading boot, keep left heel elevated off bed.   - Rest of care per primary team   - Vascular surgery Team 3C will continue to follow. Please call [phone] with any questions or concerns

## 2023-02-03 NOTE — PROGRESS NOTE ADULT - ASSESSMENT
77M with PMHx of IVDU found unresponsive at his nursing home, resolved after Narcan in the field and brought to [facility]. Found to have PE, atrial flutter, and large LV thrombus on echo. Transferred to [facility] for further management. Pt c/o abdominal pain on 12/10 CTA showing mid SMA with embolus. Abnormal distal small bowel loops and cecum with dilatation and pneumatosis suggesting infarcted bowel. One or two tiny foci of intrahepatic portal vein pneumatosis. Segmental infarction upper pole left kidney. Now s/p Ex lap, SMA embolectomy, 80cm SBR, abthera vac left in discontinuity (12/11) and transferred to SICU intubated. S/p second look (12/13) and most recently s/p OR for 3rd look, end-to-end anastomosis of remaining bowel, loop ileostomy and abdomen closure (12/15). LLE ischemia, AKA delayed by nutritional optimization.    Regular diet w/ ensures  PPN  Zosyn  f/u blood cxs (NGTD)  Pain/nausea control  A flutter now s/p cardioversion, amio 100 QD, toprol XL 25 QD  LV thrombus w LLE limb ischemia  Lovenox  OOBA/IS  AKA likely outpatient  AM labs  Dispo: optimize nutrition status, possible OR next week for ileostomy reversal

## 2023-02-03 NOTE — PROGRESS NOTE ADULT - ASSESSMENT
77y y/o Male with significant PMHx of IVDU found unresponsive at his nursing home, resolved after Narcan in the field, and brought to [facility]. Found to have PE, atrial flutter, and large LV thrombus on echo. Transferred to [facility] for further management. Pt C/o abdominal pain on 12/10 CTA showing mid SMA with embolus. Abnormal distal small bowel loops and cecum with dilatation and pneumatosis suggesting infarcted bowel. One or two tiny foci of  intrahepatic portal vein pneumatosis. Segmental infarction upper pole left kidney. Now s/p Ex lap, SMA embolectomy, 80cm SBR, abthera vac left in discontinuity (12/11) and transferred to SICU intubated. S/p second look (12/13) and most recently s/p OR for 3rd look, end-to-end anastomosis of remaining bowel, loop ileostomy and abdomen closure (12/15).  LLE Ischemia has not yet fully demarcated, it is clear that the posterior calf (which is needed to heal a BKA flap) is involved and therefore only surgical option available to the patient is an AKA pending medical optimization. LLE gangrene dry at this time.     - LLE unchanged from prior examinations, dry gangrene extending from toes to just below the knee, no wet gangrene observed  - No vascular surgery intervention precluding discharge  - Patient can follow up when nutritional status is optimized for elective amputation  - Continue daily local wound care with betadine to all skin below the line of demarcation of his left shin and Kerlix. No offloading boot, keep left heel elevated off bed.   - Rest of care per primary team   - Vascular surgery Team 3C will continue to follow. Please call [phone] with any questions or concerns

## 2023-02-03 NOTE — PROGRESS NOTE ADULT - SUBJECTIVE AND OBJECTIVE BOX
SUBJECTIVE:  Doing well this AM. NAEON. Denies cp/sob. Pain is well controlled. Tolerating diet and TPN. No fevers or purulence from leg.    MEDICATIONS  (STANDING):  aMIOdarone    Tablet 100 milliGRAM(s) Oral every 24 hours  ascorbic acid 500 milliGRAM(s) Oral daily  chlorhexidine 2% Cloths 1 Application(s) Topical <User Schedule>  chlorhexidine 2% Cloths 1 Application(s) Topical <User Schedule>  dextrose 5%. 1000 milliLiter(s) (50 mL/Hr) IV Continuous <Continuous>  dextrose 5%. 1000 milliLiter(s) (100 mL/Hr) IV Continuous <Continuous>  diphenoxylate/atropine 2 Tablet(s) Oral every 8 hours  enoxaparin Injectable 60 milliGRAM(s) SubCutaneous every 12 hours  fat emulsion (Fish Oil and Plant Based) 20% Infusion 0.7764 Gm/kG/Day (20.8 mL/Hr) IV Continuous <Continuous>  glucagon  Injectable 1 milliGRAM(s) IntraMuscular once  influenza  Vaccine (HIGH DOSE) 0.7 milliLiter(s) IntraMuscular once  insulin lispro (ADMELOG) corrective regimen sliding scale   SubCutaneous every 6 hours  lactated ringers Bolus 500 milliLiter(s) IV Bolus once  loperamide 4 milliGRAM(s) Oral every 8 hours  metoprolol succinate ER 25 milliGRAM(s) Oral daily  mirtazapine 30 milliGRAM(s) Oral at bedtime  multivitamin 1 Tablet(s) Oral daily  opium Tincture 6 milliGRAM(s) Oral every 12 hours  pantoprazole    Tablet 40 milliGRAM(s) Oral two times a day  Parenteral Nutrition - Adult 1 Each (83 mL/Hr) TPN Continuous <Continuous>  Parenteral Nutrition - Adult 1 Each (83 mL/Hr) TPN Continuous <Continuous>  piperacillin/tazobactam IVPB.. 3.375 Gram(s) IV Intermittent every 8 hours  psyllium Powder 1 Packet(s) Oral every 8 hours  sacubitril 24 mG/valsartan 26 mG 1 Tablet(s) Oral two times a day  silver sulfADIAZINE 1% Cream 1 Application(s) Topical daily  spironolactone 25 milliGRAM(s) Oral daily  zinc sulfate 220 milliGRAM(s) Oral daily    MEDICATIONS  (PRN):  acetaminophen     Tablet .. 650 milliGRAM(s) Oral every 6 hours PRN Temp greater or equal to 38C (100.4F), Mild Pain (1 - 3)  dextrose Oral Gel 15 Gram(s) Oral once PRN Blood Glucose LESS THAN 70 milliGRAM(s)/deciliter  melatonin 3 milliGRAM(s) Oral at bedtime PRN Insomnia  ondansetron Injectable 4 milliGRAM(s) IV Push every 6 hours PRN Nausea and/or Vomiting  oxyCODONE    IR 5 milliGRAM(s) Oral every 6 hours PRN Severe Pain (7 - 10)  sodium chloride 0.9% lock flush 10 milliLiter(s) IV Push every 1 hour PRN Pre/post blood products, medications, blood draw, and to maintain line patency      Vital Signs Last 24 Hrs  T(C): 36.2 (03 Feb 2023 09:04), Max: 38.1 (02 Feb 2023 13:52)  T(F): 97.2 (03 Feb 2023 09:04), Max: 100.5 (02 Feb 2023 13:52)  HR: 102 (03 Feb 2023 08:15) (102 - 995)  BP: 103/52 (03 Feb 2023 08:15) (95/53 - 108/62)  BP(mean): 70 (03 Feb 2023 08:15) (67 - 80)  RR: 18 (03 Feb 2023 08:15) (17 - 18)  SpO2: 99% (03 Feb 2023 08:15) (99% - 99%)    Parameters below as of 03 Feb 2023 08:15  Patient On (Oxygen Delivery Method): room air        Physical Exam:  General: NAD, resting comfortably in bed  Pulmonary: Nonlabored breathing, no respiratory distress  Cardiovascular: NSR  Abdominal: soft, NT/ND  Extremities: WWP, normal strength, dry gangrene to below knee LLE  Neuro: A/O x 3, CNs II-XII grossly intact, no focal deficits    I&O's Summary    02 Feb 2023 07:01  -  03 Feb 2023 07:00  --------------------------------------------------------  IN: 2517 mL / OUT: 3695 mL / NET: -1178 mL    03 Feb 2023 07:01  -  03 Feb 2023 11:12  --------------------------------------------------------  IN: 166 mL / OUT: 200 mL / NET: -34 mL        LABS:                        8.8    11.18 )-----------( 509      ( 02 Feb 2023 12:33 )             26.4     02-02    130<L>  |  101  |  25<H>  ----------------------------<  133<H>  3.6   |  20<L>  |  0.66    Ca    8.3<L>      02 Feb 2023 12:33  Phos  2.8     02-02  Mg     1.9     02-02      CAPILLARY BLOOD GLUCOSE      POCT Blood Glucose.: 135 mg/dL (02 Feb 2023 11:22)

## 2023-02-03 NOTE — PROGRESS NOTE ADULT - SUBJECTIVE AND OBJECTIVE BOX
SUBJECTIVE:  Patient seen and examined on AM rounds with chief resident. No acute events overnight. This morning, he was being cleaned by nursing staff. He is feeling ok. Reports back pain controlled with PO pain medications. Denies nausea, vomiting, fever, and chills. Tolerating regular diet and PPN.     MEDICATIONS  (STANDING):  aMIOdarone    Tablet 100 milliGRAM(s) Oral every 24 hours  ascorbic acid 500 milliGRAM(s) Oral daily  chlorhexidine 2% Cloths 1 Application(s) Topical <User Schedule>  chlorhexidine 2% Cloths 1 Application(s) Topical <User Schedule>  dextrose 5%. 1000 milliLiter(s) (50 mL/Hr) IV Continuous <Continuous>  dextrose 5%. 1000 milliLiter(s) (100 mL/Hr) IV Continuous <Continuous>  diphenoxylate/atropine 2 Tablet(s) Oral every 8 hours  enoxaparin Injectable 60 milliGRAM(s) SubCutaneous every 12 hours  fat emulsion (Fish Oil and Plant Based) 20% Infusion 0.7764 Gm/kG/Day (20.8 mL/Hr) IV Continuous <Continuous>  glucagon  Injectable 1 milliGRAM(s) IntraMuscular once  influenza  Vaccine (HIGH DOSE) 0.7 milliLiter(s) IntraMuscular once  insulin lispro (ADMELOG) corrective regimen sliding scale   SubCutaneous every 6 hours  loperamide 4 milliGRAM(s) Oral every 8 hours  metoprolol succinate ER 25 milliGRAM(s) Oral daily  mirtazapine 30 milliGRAM(s) Oral at bedtime  multivitamin 1 Tablet(s) Oral daily  opium Tincture 6 milliGRAM(s) Oral every 12 hours  pantoprazole    Tablet 40 milliGRAM(s) Oral two times a day  Parenteral Nutrition - Adult 1 Each (83 mL/Hr) TPN Continuous <Continuous>  piperacillin/tazobactam IVPB.. 3.375 Gram(s) IV Intermittent every 8 hours  psyllium Powder 1 Packet(s) Oral every 8 hours  sacubitril 24 mG/valsartan 26 mG 1 Tablet(s) Oral two times a day  silver sulfADIAZINE 1% Cream 1 Application(s) Topical daily  spironolactone 25 milliGRAM(s) Oral daily  zinc sulfate 220 milliGRAM(s) Oral daily    MEDICATIONS  (PRN):  acetaminophen     Tablet .. 650 milliGRAM(s) Oral every 6 hours PRN Temp greater or equal to 38C (100.4F), Mild Pain (1 - 3)  dextrose Oral Gel 15 Gram(s) Oral once PRN Blood Glucose LESS THAN 70 milliGRAM(s)/deciliter  melatonin 3 milliGRAM(s) Oral at bedtime PRN Insomnia  ondansetron Injectable 4 milliGRAM(s) IV Push every 6 hours PRN Nausea and/or Vomiting  oxyCODONE    IR 5 milliGRAM(s) Oral every 6 hours PRN Severe Pain (7 - 10)  sodium chloride 0.9% lock flush 10 milliLiter(s) IV Push every 1 hour PRN Pre/post blood products, medications, blood draw, and to maintain line patency      Vital Signs Last 24 Hrs  T(C): 37.4 (03 Feb 2023 04:42), Max: 38.1 (02 Feb 2023 13:52)  T(F): 99.3 (03 Feb 2023 04:42), Max: 100.5 (02 Feb 2023 13:52)  HR: 114 (03 Feb 2023 04:04) (106 - 995)  BP: 106/58 (03 Feb 2023 04:04) (95/53 - 108/62)  BP(mean): 74 (03 Feb 2023 04:04) (67 - 80)  RR: 17 (03 Feb 2023 04:04) (17 - 18)  SpO2: 99% (03 Feb 2023 04:04) (99% - 99%)    Parameters below as of 03 Feb 2023 04:04  Patient On (Oxygen Delivery Method): room air    Physical Exam:  General: NAD, resting comfortably in bed  Pulmonary: Nonlabored breathing, no respiratory distress  Cardiovascular: NSR  Abdominal: soft, NT/ND. ostomy in place with output   Extremities: WWP, normal strength, dry gangrene to L knee    I&O's Summary    02 Feb 2023 07:01  -  03 Feb 2023 07:00  --------------------------------------------------------  IN: 1790.4 mL / OUT: 2145 mL / NET: -354.6 mL        LABS:                        8.8    11.18 )-----------( 509      ( 02 Feb 2023 12:33 )             26.4     02-02    130<L>  |  101  |  25<H>  ----------------------------<  133<H>  3.6   |  20<L>  |  0.66    Ca    8.3<L>      02 Feb 2023 12:33  Phos  2.8     02-02  Mg     1.9     02-02          CAPILLARY BLOOD GLUCOSE      POCT Blood Glucose.: 135 mg/dL (02 Feb 2023 11:22)        RADIOLOGY & ADDITIONAL STUDIES:

## 2023-02-03 NOTE — CHART NOTE - NSCHARTNOTEFT_GEN_A_CORE
The writer met with the patient for f/u individual psychotherapy. Much of the session focused on the patient's experiences of physical pain, with the writer providing support throughout. The patient appeared grossly intact cognitively, though cognitive and memory function should continue to be assessed. The patient continued to report vague suicidal ideation, though denied any intent to harm himself. No HIIPAVH reported. The patient was receptive to these interventions. The patient ended the session in good behavioral and emotional control. Psychology will continue to follow.

## 2023-02-03 NOTE — PROGRESS NOTE ADULT - ASSESSMENT
77M first presented to [facility] from [facility] due to unresponsiveness (responded to Narcan). At [facility], found to have subsegmental pulmonary embolism in RUL, rapid atrial flutter with severely reduced LVEF with LV thrombus. Transferred to [facility] on 12/7/22 for LORENZO and possible ablation. At [facility], was found to have left leg ischemia (left leg arterial thrombus on LE duplex on 12/8). Was being considered for rhythm control when he developed abdominal pain, CTA (12/10/22) showed embolus in SMA, bowel infarct, requiring ex-lap on 12/11 with SMA embolectomy, 80cm small bowel resection; On 12/13, underwent 2nd look laparotomy, with 80cm small bowel resection, closure with abthera. On 12/15, underwent 3rd look laparotomy, end-to-end anastomosis of remaining bowel, loop ileostomy and abdominal closure. Now extubated, on tele floor. Eventually underwent LORENZO/DCCV on 12/30/22, now in sinus rhythm. Currently being evaluated for possible above knee amputation for LLE ischemia (possibly deferring till next hospitalization, after optimization of nutritional status).      #Fever  #Leukocytosis   -Pt with CT abd/pelvis which showed fluid collection. Pt was seen by IR re: fluid collection however the pt's imaging was reviewed by IR and it was determined that the fluid collection is not amenable to percutaneous drainage d/t bowel obstructing a safe window.  - Continue to monitor WBC  - continues to be intermittently febrile - send cultures  - ID is following  - ID continue- zosyn and reimaging abdomen in the next week,     # Left leg ischemia   - plan per vascular and primary team    #Suicidal Ideation  #Insomnia   - Pt was placed back on 1:1 observation for suicidality.   - Continuing mirtazapine to 30mg QHS for ongoing depressive sx and insomnia    # Acute Systolic Heart Failure   - Metoprolol succinate 25mg qd - continue    # Atrial Flutter  - s/p DCCV 12/30  - EP recs - uninterrupted AC x 30 days ; January 29th was 30 days post DCCV.   -Continue Lovenox, amiodarone and metoprolol succinate    # Pulmonary embolism (subsegmental RUL)   - After completion of AC for DCCV, would need to continue AC for PE - continuing lovenox at this time  - still minimally tachycardic - potentially presence of PE contributes to tachycardia. Otherwise hemodynamics are stable.     # Bowel ischemia - s/p SMA embolectomy; Small bowel resection  - ileostomy in place  - plan of primary team  - loperamide and diphenoxylate atropine    # Severe protein-calorie malnutrition  # Sacral wound   - needs nutritional optimization  - Wound care per nursing protocol     #Hyponatremia   - Sodium, Serum: 130 mmol/L (01-31-23 @ 05:51) - likely related to poor nutrition, and hypovolemic hyponatremia.  Having loose stools, likely from TPN.   -Continue daily BMP, patient refused labs today     #DVT ppx -  lovenox for PE   Discussed with primary team

## 2023-02-03 NOTE — PROGRESS NOTE ADULT - SUBJECTIVE AND OBJECTIVE BOX
Patient is a 78y old  Male who presents with a chief complaint of A flutter (03 Feb 2023 11:12)    INTERVAL EVENTS:    SUBJECTIVE:  Patient was seen and examined at bedside. Patient reports feeling at baseline, denies complaints.     Review of systems: No fever, chills, dizziness, HA, Changes in vision, CP, dyspnea, nausea or vomiting, dysuria, changes in bowel movements, LE edema. Rest of 12 point Review of systems negative unless otherwise documented elsewhere in note.     Diet, Regular:     Start Time: 19:00  Supplement Feeding Modality:  Oral  Ensure Enlive Cans or Servings Per Day:  1       Frequency:  Three Times a day (01-20-23 @ 06:53) [Active]      MEDICATIONS:  MEDICATIONS  (STANDING):  aMIOdarone    Tablet 100 milliGRAM(s) Oral every 24 hours  ascorbic acid 500 milliGRAM(s) Oral daily  chlorhexidine 2% Cloths 1 Application(s) Topical <User Schedule>  chlorhexidine 2% Cloths 1 Application(s) Topical <User Schedule>  dextrose 5%. 1000 milliLiter(s) (50 mL/Hr) IV Continuous <Continuous>  dextrose 5%. 1000 milliLiter(s) (100 mL/Hr) IV Continuous <Continuous>  diphenoxylate/atropine 2 Tablet(s) Oral every 8 hours  enoxaparin Injectable 60 milliGRAM(s) SubCutaneous every 12 hours  fat emulsion (Fish Oil and Plant Based) 20% Infusion 0.7764 Gm/kG/Day (20.83 mL/Hr) IV Continuous <Continuous>  glucagon  Injectable 1 milliGRAM(s) IntraMuscular once  influenza  Vaccine (HIGH DOSE) 0.7 milliLiter(s) IntraMuscular once  insulin lispro (ADMELOG) corrective regimen sliding scale   SubCutaneous every 6 hours  loperamide 4 milliGRAM(s) Oral every 8 hours  metoprolol succinate ER 25 milliGRAM(s) Oral daily  mirtazapine 30 milliGRAM(s) Oral at bedtime  multivitamin 1 Tablet(s) Oral daily  opium Tincture 6 milliGRAM(s) Oral every 12 hours  pantoprazole    Tablet 40 milliGRAM(s) Oral two times a day  Parenteral Nutrition - Adult 1 Each (83 mL/Hr) TPN Continuous <Continuous>  Parenteral Nutrition - Adult 1 Each (83 mL/Hr) TPN Continuous <Continuous>  piperacillin/tazobactam IVPB.. 3.375 Gram(s) IV Intermittent every 8 hours  psyllium Powder 1 Packet(s) Oral every 8 hours  sacubitril 24 mG/valsartan 26 mG 1 Tablet(s) Oral two times a day  silver sulfADIAZINE 1% Cream 1 Application(s) Topical daily  spironolactone 25 milliGRAM(s) Oral daily  zinc sulfate 220 milliGRAM(s) Oral daily    MEDICATIONS  (PRN):  acetaminophen     Tablet .. 650 milliGRAM(s) Oral every 6 hours PRN Temp greater or equal to 38C (100.4F), Mild Pain (1 - 3)  dextrose Oral Gel 15 Gram(s) Oral once PRN Blood Glucose LESS THAN 70 milliGRAM(s)/deciliter  melatonin 3 milliGRAM(s) Oral at bedtime PRN Insomnia  ondansetron Injectable 4 milliGRAM(s) IV Push every 6 hours PRN Nausea and/or Vomiting  oxyCODONE    IR 5 milliGRAM(s) Oral every 6 hours PRN Severe Pain (7 - 10)  sodium chloride 0.9% lock flush 10 milliLiter(s) IV Push every 1 hour PRN Pre/post blood products, medications, blood draw, and to maintain line patency      Allergies    No Known Allergies    Intolerances        OBJECTIVE:  Vital Signs Last 24 Hrs  T(C): 37.3 (03 Feb 2023 13:40), Max: 37.4 (03 Feb 2023 04:42)  T(F): 99.1 (03 Feb 2023 13:40), Max: 99.3 (03 Feb 2023 04:42)  HR: 122 (03 Feb 2023 10:45) (102 - 995)  BP: 173/71 (03 Feb 2023 10:45) (94/54 - 173/71)  BP(mean): 102 (03 Feb 2023 10:45) (67 - 102)  RR: 18 (03 Feb 2023 08:15) (17 - 18)  SpO2: 78% (03 Feb 2023 10:45) (73% - 99%)    Parameters below as of 03 Feb 2023 08:15  Patient On (Oxygen Delivery Method): room air      I&O's Summary    02 Feb 2023 07:01  -  03 Feb 2023 07:00  --------------------------------------------------------  IN: 2517 mL / OUT: 3695 mL / NET: -1178 mL    03 Feb 2023 07:01  -  03 Feb 2023 15:04  --------------------------------------------------------  IN: 166 mL / OUT: 200 mL / NET: -34 mL        PHYSICAL EXAM:  General: AOX3, NAD, lying in bed, no labored breathing on RA  HEENT: AT/NC, no facial asymmetry  Lungs: poor inspiration, no crackles, no wheezes  Abdomen: soft, non-tender  Extremities: left leg dry gangrene, no gross focal deficit       LABS:                        8.8    11.18 )-----------( 509      ( 02 Feb 2023 12:33 )             26.4     02-02    130<L>  |  101  |  25<H>  ----------------------------<  133<H>  3.6   |  20<L>  |  0.66    Ca    8.3<L>      02 Feb 2023 12:33  Phos  2.8     02-02  Mg     1.9     02-02          CAPILLARY BLOOD GLUCOSE            MICRODATA:      RADIOLOGY/OTHER STUDIES:

## 2023-02-04 NOTE — PROGRESS NOTE ADULT - ASSESSMENT
77M with PMHx of IVDU found unresponsive at his nursing home, resolved after Narcan in the field and brought to [facility]. Found to have PE, atrial flutter, and large LV thrombus on echo. Transferred to [facility] for further management. Pt c/o abdominal pain on 12/10 CTA showing mid SMA with embolus. Abnormal distal small bowel loops and cecum with dilatation and pneumatosis suggesting infarcted bowel. One or two tiny foci of intrahepatic portal vein pneumatosis. Segmental infarction upper pole left kidney. Now s/p Ex lap, SMA embolectomy, 80cm SBR, abthera vac left in discontinuity (12/11) and transferred to SICU intubated. S/p second look (12/13) and most recently s/p OR for 3rd look, end-to-end anastomosis of remaining bowel, loop ileostomy and abdomen closure (12/15). LLE ischemia, AKA delayed by nutritional optimization.    Regular diet w/ ensures  PPN  Zosyn  f/u blood cxs (NGTD)  Pain/nausea control  A flutter now s/p cardioversion, amio 100 QD, toprol XL 25 QD  LV thrombus w LLE limb ischemia  Lovenox  OOBA/IS  AKA likely outpatient  AM labs  Dispo: optimize nutrition status, possible OR next week for ileostomy reversal

## 2023-02-04 NOTE — PROGRESS NOTE ADULT - ASSESSMENT
77M first presented to [facility] from [facility] due to unresponsiveness (responded to Narcan). At [facility], found to have subsegmental pulmonary embolism in RUL, rapid atrial flutter with severely reduced LVEF with LV thrombus. Transferred to [facility] on 12/7/22 for LORENZO and possible ablation. At [facility], was found to have left leg ischemia (left leg arterial thrombus on LE duplex on 12/8). Was being considered for rhythm control when he developed abdominal pain, CTA (12/10/22) showed embolus in SMA, bowel infarct, requiring ex-lap on 12/11 with SMA embolectomy, 80cm small bowel resection; On 12/13, underwent 2nd look laparotomy, with 80cm small bowel resection, closure with abthera. On 12/15, underwent 3rd look laparotomy, end-to-end anastomosis of remaining bowel, loop ileostomy and abdominal closure. Now extubated, on tele floor. Eventually underwent LORENZO/DCCV on 12/30/22, now in sinus rhythm. Currently being evaluated for possible above knee amputation for LLE ischemia (possibly deferring till next hospitalization, after optimization of nutritional status).      #Fever  #Leukocytosis   -Pt with CT abd/pelvis which showed fluid collection. Pt was seen by IR re: fluid collection however the pt's imaging was reviewed by IR and it was determined that the fluid collection is not amenable to percutaneous drainage d/t bowel obstructing a safe window.  - Continue to monitor WBC  - continues to be intermittently febrile - send cultures  - Appreciate ID. On Pip/Tazo currently  -possible OR next week     # Left leg ischemia   - plan per vascular and primary team    #Suicidal Ideation  #Insomnia   - Pt was placed back on 1:1 observation for suicidality.   - Continuing mirtazapine to 30mg QHS for ongoing depressive sx and insomnia    # Acute Systolic Heart Failure   - Metoprolol succinate 25mg qd - continue    # Atrial Flutter  - s/p DCCV 12/30  - EP recs - uninterrupted AC x 30 days ; January 29th was 30 days post DCCV.   -Continue Lovenox, amiodarone and metoprolol succinate    # Pulmonary embolism (subsegmental RUL)   - After completion of AC for DCCV, would need to continue AC for PE - continuing lovenox at this time  - still minimally tachycardic - potentially presence of PE contributes to tachycardia. Otherwise hemodynamics are stable.     # Bowel ischemia - s/p SMA embolectomy; Small bowel resection  - ileostomy in place  - plan of primary team  - loperamide and diphenoxylate atropine    # Severe protein-calorie malnutrition  # Sacral wound   - needs nutritional optimization  - Wound care per nursing protocol     #Hyponatremia   - Sodium, Serum: 130 mmol/L (01-31-23 @ 05:51) - likely related to poor nutrition, and hypovolemic hyponatremia.   -Continue daily BMP, patient refused labs today     #DVT ppx -  lovenox for PE   Discussed with primary team

## 2023-02-04 NOTE — PROGRESS NOTE ADULT - SUBJECTIVE AND OBJECTIVE BOX
STATUS POST:       SUBJECTIVE: Patient seen and examined bedside by chief resident.    aMIOdarone    Tablet 100 milliGRAM(s) Oral every 24 hours  enoxaparin Injectable 60 milliGRAM(s) SubCutaneous every 12 hours  metoprolol succinate ER 25 milliGRAM(s) Oral daily  piperacillin/tazobactam IVPB.. 3.375 Gram(s) IV Intermittent every 8 hours  sacubitril 24 mG/valsartan 26 mG 1 Tablet(s) Oral two times a day  spironolactone 25 milliGRAM(s) Oral daily      Vital Signs Last 24 Hrs  T(C): 37.3 (03 Feb 2023 22:00), Max: 37.3 (03 Feb 2023 13:40)  T(F): 99.1 (03 Feb 2023 22:00), Max: 99.1 (03 Feb 2023 13:40)  HR: 104 (04 Feb 2023 03:54) (102 - 122)  BP: 108/57 (04 Feb 2023 03:54) (94/54 - 173/71)  BP(mean): 78 (04 Feb 2023 03:54) (67 - 102)  RR: 17 (04 Feb 2023 03:54) (17 - 18)  SpO2: 95% (04 Feb 2023 03:54) (73% - 99%)    Parameters below as of 04 Feb 2023 03:54  Patient On (Oxygen Delivery Method): room air      I&O's Detail    02 Feb 2023 07:01  -  03 Feb 2023 07:00  --------------------------------------------------------  IN:    Fat Emulsion (Fish Oil &amp; Plant Based) 20% Infusion: 208 mL    Oral Fluid: 400 mL    PPN (Peripheral Parenteral Nutrition): 1909 mL  Total IN: 2517 mL    OUT:    Ileostomy (mL): 2250 mL    Voided (mL): 1445 mL  Total OUT: 3695 mL    Total NET: -1178 mL      03 Feb 2023 07:01  -  04 Feb 2023 06:29  --------------------------------------------------------  IN:    Fat Emulsion (Fish Oil &amp; Plant Based) 20% Infusion: 124.8 mL    IV PiggyBack: 100 mL    PPN (Peripheral Parenteral Nutrition): 1494 mL  Total IN: 1718.8 mL    OUT:    Ileostomy (mL): 250 mL    Voided (mL): 825 mL  Total OUT: 1075 mL    Total NET: 643.8 mL          Physical Exam:  General: NAD, resting comfortably in bed  Pulmonary: Nonlabored breathing, no respiratory distress  Cardiovascular: NSR  Abdominal: soft, NT/ND. ostomy in place with output   Extremities: WWP, normal strength, dry gangrene to L knee          LABS:                        8.8    11.18 )-----------( 509      ( 02 Feb 2023 12:33 )             26.4     02-02    130<L>  |  101  |  25<H>  ----------------------------<  133<H>  3.6   |  20<L>  |  0.66    Ca    8.3<L>      02 Feb 2023 12:33  Phos  2.8     02-02  Mg     1.9     02-02            RADIOLOGY & ADDITIONAL STUDIES:

## 2023-02-04 NOTE — PROGRESS NOTE ADULT - SUBJECTIVE AND OBJECTIVE BOX
Patient is a 78y old  Male who presents with a chief complaint of A flutter (04 Feb 2023 06:28)    INTERVAL EVENTS:    SUBJECTIVE:  Patient was seen and examined at bedside. Patient reports feeling at baseline, denies any complaints. Refused labs yesterday and today. Attempted to discuss with patient need for blood monitoring, he refused.    Review of systems: No fever, chills, dizziness, HA, Changes in vision, CP, dyspnea, nausea or vomiting, dysuria, changes in bowel movements, LE edema. Rest of 12 point Review of systems negative unless otherwise documented elsewhere in note.     Diet, Regular:     Start Time: 19:00  Supplement Feeding Modality:  Oral  Ensure Enlive Cans or Servings Per Day:  1       Frequency:  Three Times a day (01-20-23 @ 06:53) [Active]      MEDICATIONS:  MEDICATIONS  (STANDING):  aMIOdarone    Tablet 100 milliGRAM(s) Oral every 24 hours  ascorbic acid 500 milliGRAM(s) Oral daily  chlorhexidine 2% Cloths 1 Application(s) Topical <User Schedule>  chlorhexidine 2% Cloths 1 Application(s) Topical <User Schedule>  dextrose 5%. 1000 milliLiter(s) (50 mL/Hr) IV Continuous <Continuous>  dextrose 5%. 1000 milliLiter(s) (100 mL/Hr) IV Continuous <Continuous>  diphenoxylate/atropine 2 Tablet(s) Oral every 8 hours  enoxaparin Injectable 60 milliGRAM(s) SubCutaneous every 12 hours  fat emulsion (Fish Oil and Plant Based) 20% Infusion 0.7764 Gm/kG/Day (20.83 mL/Hr) IV Continuous <Continuous>  glucagon  Injectable 1 milliGRAM(s) IntraMuscular once  influenza  Vaccine (HIGH DOSE) 0.7 milliLiter(s) IntraMuscular once  insulin lispro (ADMELOG) corrective regimen sliding scale   SubCutaneous every 6 hours  lactated ringers Bolus 250 milliLiter(s) IV Bolus once  loperamide 4 milliGRAM(s) Oral every 8 hours  metoprolol succinate ER 25 milliGRAM(s) Oral daily  mirtazapine 30 milliGRAM(s) Oral at bedtime  multivitamin 1 Tablet(s) Oral daily  opium Tincture 6 milliGRAM(s) Oral every 12 hours  pantoprazole    Tablet 40 milliGRAM(s) Oral two times a day  Parenteral Nutrition - Adult 1 Each (83 mL/Hr) TPN Continuous <Continuous>  piperacillin/tazobactam IVPB.. 3.375 Gram(s) IV Intermittent every 8 hours  psyllium Powder 1 Packet(s) Oral every 8 hours  sacubitril 24 mG/valsartan 26 mG 1 Tablet(s) Oral two times a day  silver sulfADIAZINE 1% Cream 1 Application(s) Topical daily  spironolactone 25 milliGRAM(s) Oral daily  zinc sulfate 220 milliGRAM(s) Oral daily    MEDICATIONS  (PRN):  acetaminophen     Tablet .. 650 milliGRAM(s) Oral every 6 hours PRN Temp greater or equal to 38C (100.4F), Mild Pain (1 - 3)  dextrose Oral Gel 15 Gram(s) Oral once PRN Blood Glucose LESS THAN 70 milliGRAM(s)/deciliter  melatonin 3 milliGRAM(s) Oral at bedtime PRN Insomnia  ondansetron Injectable 4 milliGRAM(s) IV Push every 6 hours PRN Nausea and/or Vomiting  oxyCODONE    IR 5 milliGRAM(s) Oral every 6 hours PRN Severe Pain (7 - 10)  sodium chloride 0.9% lock flush 10 milliLiter(s) IV Push every 1 hour PRN Pre/post blood products, medications, blood draw, and to maintain line patency      Allergies    No Known Allergies    Intolerances        OBJECTIVE:  Vital Signs Last 24 Hrs  T(C): 36.8 (04 Feb 2023 09:08), Max: 37.3 (03 Feb 2023 13:40)  T(F): 98.2 (04 Feb 2023 09:08), Max: 99.1 (03 Feb 2023 13:40)  HR: 104 (04 Feb 2023 03:54) (102 - 122)  BP: 108/57 (04 Feb 2023 03:54) (101/55 - 173/71)  BP(mean): 78 (04 Feb 2023 03:54) (69 - 102)  RR: 17 (04 Feb 2023 03:54) (17 - 17)  SpO2: 95% (04 Feb 2023 03:54) (78% - 99%)    Parameters below as of 04 Feb 2023 03:54  Patient On (Oxygen Delivery Method): room air      I&O's Summary    03 Feb 2023 07:01  -  04 Feb 2023 07:00  --------------------------------------------------------  IN: 2030.2 mL / OUT: 1775 mL / NET: 255.2 mL        PHYSICAL EXAM:  General: AOX3, NAD, lying in bed, no labored breathing on RA  HEENT: AT/NC, no facial asymmetry  Lungs: poor inspiration, no crackles, no wheezes  Abdomen: soft, non-tender  Extremities: left leg dry gangrene, no gross focal deficit   LABS:                        8.8    11.18 )-----------( 509      ( 02 Feb 2023 12:33 )             26.4     02-02    130<L>  |  101  |  25<H>  ----------------------------<  133<H>  3.6   |  20<L>  |  0.66    Ca    8.3<L>      02 Feb 2023 12:33  Phos  2.8     02-02  Mg     1.9     02-02          CAPILLARY BLOOD GLUCOSE            MICRODATA:      RADIOLOGY/OTHER STUDIES:

## 2023-02-04 NOTE — PROVIDER CONTACT NOTE (OTHER) - SITUATION
Stat bolus ordered. Requesting US guided peripheral IV. Also to request order for PPN and lipids to be administered peripherally. PPN bags advises it may be given through a peripheral line.

## 2023-02-04 NOTE — PROGRESS NOTE ADULT - REASON FOR ADMISSION
A flutter Unna Boot Text: An Unna boot was placed to help immobilize the limb and facilitate more rapid healing.

## 2023-02-04 NOTE — PROVIDER CONTACT NOTE (OTHER) - ACTION/TREATMENT ORDERED:
MD Praneeth Huertas advised STAT bolus may be given when line becomes available and order to give PPN and lipids peripherally will be placed.

## 2023-02-04 NOTE — PROVIDER CONTACT NOTE (OTHER) - ACTION/TREATMENT ORDERED:
MD Huertas advised the attending is aware and to continue to monitor the patient. Pt has pmh of heart condition. Will adjust parameters. no intervention at this time.

## 2023-02-04 NOTE — PROVIDER CONTACT NOTE (OTHER) - RECOMMENDATIONS
US guided Peripheral IV. Also to place Provider to RN note stating PPN and lipids may be delivered via peripheral IV.

## 2023-02-05 NOTE — PROVIDER CONTACT NOTE (OTHER) - ASSESSMENT
Patient asymptomatic. No complains of SOB, Chest pain, difficulty breathing, dizziness/headache or vision changes.

## 2023-02-05 NOTE — PROGRESS NOTE ADULT - SUBJECTIVE AND OBJECTIVE BOX
STATUS POST:       SUBJECTIVE: Patient seen and examined bedside by chief resident.    aMIOdarone    Tablet 100 milliGRAM(s) Oral every 24 hours  enoxaparin Injectable 60 milliGRAM(s) SubCutaneous every 12 hours  metoprolol succinate ER 25 milliGRAM(s) Oral daily  piperacillin/tazobactam IVPB.. 3.375 Gram(s) IV Intermittent every 8 hours  sacubitril 24 mG/valsartan 26 mG 1 Tablet(s) Oral two times a day  spironolactone 25 milliGRAM(s) Oral daily      Vital Signs Last 24 Hrs  T(C): 36.9 (05 Feb 2023 04:46), Max: 37.3 (04 Feb 2023 21:54)  T(F): 98.4 (05 Feb 2023 04:46), Max: 99.1 (04 Feb 2023 21:54)  HR: 104 (05 Feb 2023 03:30) (98 - 112)  BP: 107/50 (05 Feb 2023 03:30) (81/49 - 117/53)  BP(mean): 71 (05 Feb 2023 03:30) (59 - 77)  RR: 18 (05 Feb 2023 03:30) (18 - 18)  SpO2: 99% (05 Feb 2023 03:30) (91% - 100%)    Parameters below as of 05 Feb 2023 03:30  Patient On (Oxygen Delivery Method): room air      I&O's Detail    03 Feb 2023 07:01  -  04 Feb 2023 07:00  --------------------------------------------------------  IN:    Fat Emulsion (Fish Oil &amp; Plant Based) 20% Infusion: 187.2 mL    IV PiggyBack: 100 mL    PPN (Peripheral Parenteral Nutrition): 1743 mL  Total IN: 2030.2 mL    OUT:    Ileostomy (mL): 700 mL    Voided (mL): 1075 mL  Total OUT: 1775 mL    Total NET: 255.2 mL      04 Feb 2023 07:01  -  05 Feb 2023 06:53  --------------------------------------------------------  IN:    Fat Emulsion (Fish Oil &amp; Plant Based) 20% Infusion: 291.2 mL    Fat Emulsion (Fish Oil &amp; Plant Based) 20% Infusion: 83.2 mL    IV PiggyBack: 200 mL    Lactated Ringers Bolus: 500 mL    Oral Fluid: 500 mL    PPN (Peripheral Parenteral Nutrition): 1992 mL  Total IN: 3566.4 mL    OUT:    Emesis (mL): 0 mL    Ileostomy (mL): 1550 mL    Voided (mL): 1150 mL  Total OUT: 2700 mL    Total NET: 866.4 mL        Physical Exam:  General: NAD, resting comfortably in bed  Pulmonary: Nonlabored breathing, no respiratory distress  Cardiovascular: NSR  Abdominal: soft, NT/ND. ostomy in place with output   Extremities: WWP, normal strength, dry gangrene to L knee        LABS:                RADIOLOGY & ADDITIONAL STUDIES:   STATUS POST:    12/11: Ex lap, SMA embolectomy, 80cm SBR, abthera vac  12/13: Second look, SBR  12/15: Ex-lap, no dead gut, DANIEL of discontinuous gut, loop ileostomy; EBL minimal, 1L crystalloid, UOP 150mL     SUBJECTIVE: Patient seen and examined bedside by chief resident. This morning, he is resting comfortably in bed. Sleepy but otherwise responds to questions. No acute distress.      aMIOdarone    Tablet 100 milliGRAM(s) Oral every 24 hours  enoxaparin Injectable 60 milliGRAM(s) SubCutaneous every 12 hours  metoprolol succinate ER 25 milliGRAM(s) Oral daily  piperacillin/tazobactam IVPB.. 3.375 Gram(s) IV Intermittent every 8 hours  sacubitril 24 mG/valsartan 26 mG 1 Tablet(s) Oral two times a day  spironolactone 25 milliGRAM(s) Oral daily      Vital Signs Last 24 Hrs  T(C): 36.9 (05 Feb 2023 04:46), Max: 37.3 (04 Feb 2023 21:54)  T(F): 98.4 (05 Feb 2023 04:46), Max: 99.1 (04 Feb 2023 21:54)  HR: 104 (05 Feb 2023 03:30) (98 - 112)  BP: 107/50 (05 Feb 2023 03:30) (81/49 - 117/53)  BP(mean): 71 (05 Feb 2023 03:30) (59 - 77)  RR: 18 (05 Feb 2023 03:30) (18 - 18)  SpO2: 99% (05 Feb 2023 03:30) (91% - 100%)    Parameters below as of 05 Feb 2023 03:30  Patient On (Oxygen Delivery Method): room air      I&O's Detail    03 Feb 2023 07:01  -  04 Feb 2023 07:00  --------------------------------------------------------  IN:    Fat Emulsion (Fish Oil &amp; Plant Based) 20% Infusion: 187.2 mL    IV PiggyBack: 100 mL    PPN (Peripheral Parenteral Nutrition): 1743 mL  Total IN: 2030.2 mL    OUT:    Ileostomy (mL): 700 mL    Voided (mL): 1075 mL  Total OUT: 1775 mL    Total NET: 255.2 mL      04 Feb 2023 07:01  -  05 Feb 2023 06:53  --------------------------------------------------------  IN:    Fat Emulsion (Fish Oil &amp; Plant Based) 20% Infusion: 291.2 mL    Fat Emulsion (Fish Oil &amp; Plant Based) 20% Infusion: 83.2 mL    IV PiggyBack: 200 mL    Lactated Ringers Bolus: 500 mL    Oral Fluid: 500 mL    PPN (Peripheral Parenteral Nutrition): 1992 mL  Total IN: 3566.4 mL    OUT:    Emesis (mL): 0 mL    Ileostomy (mL): 1550 mL    Voided (mL): 1150 mL  Total OUT: 2700 mL    Total NET: 866.4 mL        Physical Exam:  General: NAD, resting comfortably in bed  Pulmonary: Nonlabored breathing, no respiratory distress  Cardiovascular: NSR  Abdominal: soft, NT/ND. ostomy in place with high light brown output   Extremities: WWP, normal strength, dry gangrene distal from L knee        LABS:                RADIOLOGY & ADDITIONAL STUDIES:

## 2023-02-05 NOTE — PROGRESS NOTE ADULT - SUBJECTIVE AND OBJECTIVE BOX
Patient is a 78y old  Male who presents with a chief complaint of A flutter (05 Feb 2023 06:52)    INTERVAL EVENTS:    SUBJECTIVE:  Patient was seen and examined at bedside. Patient hypotensive this morning. Denies complaints, refusing labs. No other complaints or events reported.    Review of systems: No fever, chills, dizziness, HA, Changes in vision, CP, dyspnea, nausea or vomiting, dysuria, changes in bowel movements, LE edema. Rest of 12 point Review of systems negative unless otherwise documented elsewhere in note.     Diet, Regular:     Start Time: 19:00  Supplement Feeding Modality:  Oral  Ensure Enlive Cans or Servings Per Day:  1       Frequency:  Three Times a day (01-20-23 @ 06:53) [Active]      MEDICATIONS:  MEDICATIONS  (STANDING):  aMIOdarone    Tablet 100 milliGRAM(s) Oral every 24 hours  ascorbic acid 500 milliGRAM(s) Oral daily  chlorhexidine 2% Cloths 1 Application(s) Topical <User Schedule>  chlorhexidine 2% Cloths 1 Application(s) Topical <User Schedule>  dextrose 5%. 1000 milliLiter(s) (50 mL/Hr) IV Continuous <Continuous>  dextrose 5%. 1000 milliLiter(s) (100 mL/Hr) IV Continuous <Continuous>  diphenoxylate/atropine 2 Tablet(s) Oral every 8 hours  enoxaparin Injectable 60 milliGRAM(s) SubCutaneous every 12 hours  fat emulsion (Fish Oil and Plant Based) 20% Infusion 0.7764 Gm/kG/Day (20.8 mL/Hr) IV Continuous <Continuous>  glucagon  Injectable 1 milliGRAM(s) IntraMuscular once  influenza  Vaccine (HIGH DOSE) 0.7 milliLiter(s) IntraMuscular once  insulin lispro (ADMELOG) corrective regimen sliding scale   SubCutaneous every 6 hours  lidocaine   4% Patch 1 Patch Transdermal once  loperamide 4 milliGRAM(s) Oral every 8 hours  metoprolol succinate ER 25 milliGRAM(s) Oral daily  mirtazapine 30 milliGRAM(s) Oral at bedtime  multivitamin 1 Tablet(s) Oral daily  opium Tincture 6 milliGRAM(s) Oral every 12 hours  pantoprazole    Tablet 40 milliGRAM(s) Oral two times a day  Parenteral Nutrition - Adult 1 Each (83 mL/Hr) TPN Continuous <Continuous>  Parenteral Nutrition - Adult 1 Each (83 mL/Hr) TPN Continuous <Continuous>  piperacillin/tazobactam IVPB.. 3.375 Gram(s) IV Intermittent every 8 hours  psyllium Powder 1 Packet(s) Oral every 8 hours  sacubitril 24 mG/valsartan 26 mG 1 Tablet(s) Oral two times a day  silver sulfADIAZINE 1% Cream 1 Application(s) Topical daily  spironolactone 25 milliGRAM(s) Oral daily  zinc sulfate 220 milliGRAM(s) Oral daily    MEDICATIONS  (PRN):  acetaminophen     Tablet .. 650 milliGRAM(s) Oral every 6 hours PRN Temp greater or equal to 38C (100.4F), Mild Pain (1 - 3)  dextrose Oral Gel 15 Gram(s) Oral once PRN Blood Glucose LESS THAN 70 milliGRAM(s)/deciliter  melatonin 3 milliGRAM(s) Oral at bedtime PRN Insomnia  ondansetron Injectable 4 milliGRAM(s) IV Push every 6 hours PRN Nausea and/or Vomiting  oxyCODONE    IR 5 milliGRAM(s) Oral every 6 hours PRN Severe Pain (7 - 10)  sodium chloride 0.9% lock flush 10 milliLiter(s) IV Push every 1 hour PRN Pre/post blood products, medications, blood draw, and to maintain line patency      Allergies    No Known Allergies    Intolerances        OBJECTIVE:  Vital Signs Last 24 Hrs  T(C): 36.9 (05 Feb 2023 09:05), Max: 37.3 (04 Feb 2023 21:54)  T(F): 98.4 (05 Feb 2023 09:05), Max: 99.1 (04 Feb 2023 21:54)  HR: 96 (05 Feb 2023 11:18) (96 - 112)  BP: 91/53 (05 Feb 2023 11:18) (64/45 - 117/53)  BP(mean): 67 (05 Feb 2023 11:18) (51 - 76)  RR: 18 (05 Feb 2023 11:18) (18 - 18)  SpO2: 100% (05 Feb 2023 11:18) (91% - 100%)    Parameters below as of 05 Feb 2023 11:18  Patient On (Oxygen Delivery Method): room air      I&O's Summary    04 Feb 2023 07:01  - 05 Feb 2023 07:00  --------------------------------------------------------  IN: 3566.4 mL / OUT: 2850 mL / NET: 716.4 mL    05 Feb 2023 07:01  -  05 Feb 2023 13:54  --------------------------------------------------------  IN: 415 mL / OUT: 680 mL / NET: -265 mL        PHYSICAL EXAM:  General: AOX3, NAD, lying in bed, no labored breathing on RA  HEENT: AT/NC, no facial asymmetry  Lungs: poor inspiration, no crackles, no wheezes  Heart: tachycardic  Abdomen: soft, non-tender  Extremities: left leg dry gangrene, no gross focal deficit     LABS:              CAPILLARY BLOOD GLUCOSE            MICRODATA:      RADIOLOGY/OTHER STUDIES:

## 2023-02-05 NOTE — PROGRESS NOTE ADULT - ASSESSMENT
Ca    8.3<L>      02 Feb 2023 12:33  Phos  2.8     02-02  Mg     1.9     02-02          CAPILLARY BLOOD GLUCOSE            MICRODATA:      RADIOLOGY/OTHER STUDIES:      Assessment and Plan:   · Assessment	  77M first presented to [facility] from [facility] due to unresponsiveness (responded to Narcan). At [facility], found to have subsegmental pulmonary embolism in RUL, rapid atrial flutter with severely reduced LVEF with LV thrombus. Transferred to [facility] on 12/7/22 for LORENZO and possible ablation. At [facility], was found to have left leg ischemia (left leg arterial thrombus on LE duplex on 12/8). Was being considered for rhythm control when he developed abdominal pain, CTA (12/10/22) showed embolus in SMA, bowel infarct, requiring ex-lap on 12/11 with SMA embolectomy, 80cm small bowel resection; On 12/13, underwent 2nd look laparotomy, with 80cm small bowel resection, closure with abthera. On 12/15, underwent 3rd look laparotomy, end-to-end anastomosis of remaining bowel, loop ileostomy and abdominal closure. Now extubated, on tele floor. Eventually underwent LORENZO/DCCV on 12/30/22, now in sinus rhythm. Currently being evaluated for possible above knee amputation for LLE ischemia (possibly deferring till next hospitalization, after optimization of nutritional status).      #Fever  #Leukocytosis   -Pt with CT abd/pelvis which showed fluid collection. Pt was seen by IR re: fluid collection however the pt's imaging was reviewed by IR and it was determined that the fluid collection is not amenable to percutaneous drainage d/t bowel obstructing a safe window.  - Continue to monitor WBC  - continues to be intermittently febrile - send cultures  - Appreciate ID. On Pip/Tazo currently  -possible OR next week     #Hypotension  Difficult to assess etiology in view of comorbidities and lack of labs over the last few days. Primary team attempting to obtain labs, however patient has been refusing  Received IVF this morning, follow-up BP. Consider ICU evaluation if persistent hypotension.    # Left leg ischemia   - plan per vascular and primary team    #Suicidal Ideation  #Insomnia   - Pt was placed back on 1:1 observation for suicidality.   - Continuing mirtazapine to 30mg QHS for ongoing depressive sx and insomnia    # Acute Systolic Heart Failure   - Metoprolol succinate 25mg qd - continue    # Atrial Flutter  - s/p DCCV 12/30  - EP recs - uninterrupted AC x 30 days ; January 29th was 30 days post DCCV.   -Continue Lovenox, amiodarone and metoprolol succinate    # Pulmonary embolism (subsegmental RUL)   - After completion of AC for DCCV, would need to continue AC for PE - continuing lovenox at this time  - still minimally tachycardic - potentially presence of PE contributes to tachycardia. Otherwise hemodynamics are stable.     # Bowel ischemia - s/p SMA embolectomy; Small bowel resection  - ileostomy in place  - plan of primary team  - loperamide and diphenoxylate atropine    # Severe protein-calorie malnutrition  # Sacral wound   - needs nutritional optimization  - Wound care per nursing protocol     #Hyponatremia   - Sodium, Serum: 130 mmol/L (01-31-23 @ 05:51) - likely related to poor nutrition, and hypovolemic hyponatremia.   -Continue daily BMP, patient refused labs today     #DVT ppx -  lovenox for PE   Discussed with primary team

## 2023-02-05 NOTE — PROGRESS NOTE ADULT - ASSESSMENT
77M with PMHx of IVDU found unresponsive at his nursing home, resolved after Narcan in the field and brought to [facility]. Found to have PE, atrial flutter, and large LV thrombus on echo. Transferred to [facility] for further management. Pt c/o abdominal pain on 12/10 CTA showing mid SMA with embolus. Abnormal distal small bowel loops and cecum with dilatation and pneumatosis suggesting infarcted bowel. One or two tiny foci of intrahepatic portal vein pneumatosis. Segmental infarction upper pole left kidney. Now s/p Ex lap, SMA embolectomy, 80cm SBR, abthera vac left in discontinuity (12/11) and transferred to SICU intubated. S/p second look (12/13) and most recently s/p OR for 3rd look, end-to-end anastomosis of remaining bowel, loop ileostomy and abdomen closure (12/15). LLE ischemia, AKA delayed by nutritional optimization.    Regular diet w/ ensures  PPN  Zosyn  f/u blood cxs (NGTD)  Pain/nausea control  A flutter now s/p cardioversion, amio 100 QD, toprol XL 25 QD  LV thrombus w LLE limb ischemia  Lovenox  OOBA/IS  AKA likely outpatient  AM labs  Dispo: optimize nutrition status, possible OR next week for ileostomy reversal    77M with PMHx of IVDU found unresponsive at his nursing home, resolved after Narcan in the field and brought to [facility]. Found to have PE, atrial flutter, and large LV thrombus on echo. Transferred to [facility] for further management. Pt c/o abdominal pain on 12/10 CTA showing mid SMA with embolus. Abnormal distal small bowel loops and cecum with dilatation and pneumatosis suggesting infarcted bowel. One or two tiny foci of intrahepatic portal vein pneumatosis. Segmental infarction upper pole left kidney. Now s/p Ex lap, SMA embolectomy, 80cm SBR, abthera vac left in discontinuity (12/11) and transferred to SICU intubated. S/p second look (12/13) and most recently s/p OR for 3rd look, end-to-end anastomosis of remaining bowel, loop ileostomy and abdomen closure (12/15). LLE ischemia, AKA delayed by nutritional optimization.    Regular diet w/ ensures  PPN  Zosyn  f/u blood cxs (NGTD)  Pain/nausea control  A flutter now s/p cardioversion, amio 100 QD, toprol XL 25 QD  LV thrombus w LLE limb ischemia  Lovenox  OOBA/IS  AKA likely outpatient  AM labs  Dispo: optimize nutrition status, possible OR next week for ileostomy reversal

## 2023-02-06 NOTE — BH CONSULTATION LIAISON PROGRESS NOTE - NSBHATTESTCOMMENTATTENDFT_PSY_A_CORE
Patient is a 78 year old male with history of substance use and no significant past medical history (poor medical follow up, last PCP visit 20 years prior to admission), presenting with unresponsiveness from nursing home to [facility], found to be in Aflutter w RVR with subsegmental PE RUL, transferred to [facility], found to have LV thrombus and worsening HF, EF 10-15%. 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 Course further complicated by LLE pulselessness and ulcerations that will ultimately require BKA. Psychiatry consulted due to chronic depressed mood in the hospital and suicidality. Per staff pt verbalizes Si without intent, when he experiences high physical distress (pain). Upon re-assessment today patient presents with similarly low mood and hopelessness about his medical conditions, slightly more fatigued and with some confusion (disoriented to the day of the week). He continues to endorse feeling motivated to engage in treatment and work on improving his physical and nutritional state and denies active SI. Patient's past statements of wanting to die appear to be triggered by moments of high distress due to pain and frustration with lack of improvement in his physical state and not actual suicidal statements.    Plan:  -patient does not require 1:1 observation for acute suicidality; consider nursing enhanced supervision   -patient to be followed daily by the Psychology team for supportive psychotherapy    -continue mirtazapine 30mg QHS for ongoing depressive sx and insomnia, will observe for response  -Encourage sleep hygiene and limiting sleep during the day    -Delirium precautions  -Psychology to follow the pt daily for supportive psychotherapy

## 2023-02-06 NOTE — BH CONSULTATION LIAISON PROGRESS NOTE - NSBHFUPINTERVALHXFT_PSY_A_CORE
Pt is a 78-year-old Black, cisgender male with prolonged hospitalization, mesenteric ischemia s/p SBR 12/2022, c/b intra-abdominal hematoma, LE gangrene pending possible AKA (pending nutritional optimization, currently experiencing malnutrition/malabsorption).    C/L psychology intern met with pt for 30-minute individual psychotherapy session. Upon approach, pt was awake, low energy, slumped over in his bed with efforts to make eye contact. Pt denied physical pain "all over." RN took pt's temperature during this session and it showed a fever. Pt expressed wanting to live but not seeing how that will be possible given his current physical condition. He stated "no one wants to know that they're going to die." Writer assessed for SI. Pt reported that he "might have to hang [himself]" but denied plan or intent, denied means. Writer and pt discussed multiple factors at play re: his low food intake, both external factors (e.g., discomfort, difficulty eating solid foods given lack of teeth) as well as internal factors (e.g., acknowledging that his appetite has changed, his body has changed, what it will mean to possibly get better and have his leg amputated). Pt maintained expression of strong desire to recover and have the amputation. Writer provided empathic, active, reflective listening, as well as validation of pt's internal conflict around wanting to live but feeling he is unable to take the necessary steps toward recovery. During session, pt ate some of a store-bought fruit pouch that he said was "not bad." Writer asked pt if he'd be interested in seeing a clergyperson in the hospital for additional support; pt declined. Session ended with pt in more stable mood and behavioral control.

## 2023-02-06 NOTE — PROGRESS NOTE ADULT - ASSESSMENT
77y y/o Male with significant PMHx of IVDU found unresponsive at his nursing home, resolved after Narcan in the field, and brought to [facility]. Found to have PE, atrial flutter, and large LV thrombus on echo. Transferred to [facility] for further management. Pt C/o abdominal pain on 12/10 CTA showing mid SMA with embolus. Abnormal distal small bowel loops and cecum with dilatation and pneumatosis suggesting infarcted bowel. One or two tiny foci of  intrahepatic portal vein pneumatosis. Segmental infarction upper pole left kidney. Now s/p Ex lap, SMA embolectomy, 80cm SBR, abthera vac left in discontinuity (12/11) and transferred to SICU intubated. S/p second look (12/13) and most recently s/p OR for 3rd look, end-to-end anastomosis of remaining bowel, loop ileostomy and abdomen closure (12/15).  LLE Ischemia has not yet fully demarcated, it is clear that the posterior calf (which is needed to heal a BKA flap) is involved and therefore only surgical option available to the patient is an AKA pending medical optimization. LLE gangrene dry at this time.     - LLE unchanged from prior examinations, dry gangrene extending from toes to just below the knee, no wet gangrene observed  - No vascular surgery intervention precluding discharge  - Patient can follow up when nutritional status is optimized for elective amputation  - Continue daily local wound care with betadine to all skin below the line of demarcation of his left shin and Kerlix. No offloading boot, keep left heel elevated off bed.   - Rest of care per primary team  77y y/o Male with significant PMHx of IVDU found unresponsive at his nursing home, resolved after Narcan in the field, and brought to [facility]. Found to have PE, atrial flutter, and large LV thrombus on echo. Transferred to [facility] for further management. Pt C/o abdominal pain on 12/10 CTA showing mid SMA with embolus. Abnormal distal small bowel loops and cecum with dilatation and pneumatosis suggesting infarcted bowel. One or two tiny foci of  intrahepatic portal vein pneumatosis. Segmental infarction upper pole left kidney. Now s/p Ex lap, SMA embolectomy, 80cm SBR, abthera vac left in discontinuity (12/11) and transferred to SICU intubated. S/p second look (12/13) and most recently s/p OR for 3rd look, end-to-end anastomosis of remaining bowel, loop ileostomy and abdomen closure (12/15).  LLE Ischemia has not yet fully demarcated, it is clear that the posterior calf (which is needed to heal a BKA flap) is involved and therefore only surgical option available to the patient is an AKA pending medical optimization. LLE gangrene dry at this time.     - LLE unchanged from prior examinations, dry gangrene extending from toes to just below the knee, no wet gangrene observed  - No vascular surgery intervention precluding discharge  - Patient can follow up with Dr. Greenberg when nutritional status is optimized for elective amputation  - Continue daily local wound care with betadine to all skin below the line of demarcation of his left shin and Kerlix. No offloading boot, keep left heel elevated off bed.   - Rest of care per primary team  - Vascular will sign off at this time. Please reconsult as needed or for any further questions.

## 2023-02-06 NOTE — CHART NOTE - NSCHARTNOTEFT_GEN_A_CORE
Admitting Diagnosis:   Patient is a 78y old  Male who presents with a chief complaint of A flutter (2023 12:21)    PAST MEDICAL & SURGICAL HISTORY:    Current Nutrition Order:  Regular diet with Ensure Enlive TID (1050 kcal, 60g protein, 540mL free H2O)  PPN; 2000 ml TV @ 83 ml/hr providing 180g dex, 80g AA, 50g SMOF, 1432 kcal, 80g pro, 1.23g/kg ABW, 1.92 GIR     PO Intake: Good (%) [   ]  Fair (50-75%) [ x  ] Poor (<25%) [ x  ]- Pt still with high outpt via ostomy, suspect malabsorption.     3 Day Calorie Count  (-):  Day 1: 136kcal,  4g pro  Meeting 8% estimated energy and 4.7% estimated protein needs  Day 2: 1226kcal,  66g pro  Meeting 75% estimated energy and 77% estimated protein needs  Day 3: 535kcal,  20g pro  Meeting 33% estimated energy and 24% estimated protein needs    PO Intake: Good (%) [   ]  Fair (50-75%) [   ] Poor (<25%) [ x  ]- <50% est needs. Please see subjective**    GI Issues:   WDL, Ileostomy (2350 ml/24hrs)  No n/v/d/c  No abd distention or discomfort noted  Inguinal hernia bump noted    Pain:  9/10 severe leg pain noted- on pain regimen    Skin Integrity:  Surgical incision, carla score 13  No pain noted   Pressure ulcer; Stg IV sacrum spine, Unstageable upper back  **Ordered for Zinc Sulfate (220 mg daily), Vit C 500mg daily, MVI daily    Labs:       125<L>  |  95<L>  |  31<H>  ----------------------------<  119<H>  4.2   |  20<L>  |  0.72    Ca    8.5      2023 14:34    CAPILLARY BLOOD GLUCOSE  POCT Blood Glucose.: 159 mg/dL (2023 17:02)    Medications:  MEDICATIONS  (STANDING):  aMIOdarone    Tablet 100 milliGRAM(s) Oral every 24 hours  ascorbic acid 500 milliGRAM(s) Oral daily  chlorhexidine 2% Cloths 1 Application(s) Topical <User Schedule>  chlorhexidine 2% Cloths 1 Application(s) Topical <User Schedule>  dextrose 5%. 1000 milliLiter(s) (50 mL/Hr) IV Continuous <Continuous>  dextrose 5%. 1000 milliLiter(s) (100 mL/Hr) IV Continuous <Continuous>  diphenoxylate/atropine 2 Tablet(s) Oral every 8 hours  enoxaparin Injectable 60 milliGRAM(s) SubCutaneous every 12 hours  fat emulsion (Fish Oil and Plant Based) 20% Infusion 0.7764 Gm/kG/Day (20.8 mL/Hr) IV Continuous <Continuous>  glucagon  Injectable 1 milliGRAM(s) IntraMuscular once  influenza  Vaccine (HIGH DOSE) 0.7 milliLiter(s) IntraMuscular once  insulin lispro (ADMELOG) corrective regimen sliding scale   SubCutaneous every 6 hours  loperamide 4 milliGRAM(s) Oral every 8 hours  metoprolol succinate ER 25 milliGRAM(s) Oral daily  mirtazapine 30 milliGRAM(s) Oral at bedtime  multivitamin 1 Tablet(s) Oral daily  opium Tincture 6 milliGRAM(s) Oral every 12 hours  pantoprazole    Tablet 40 milliGRAM(s) Oral two times a day  Parenteral Nutrition - Adult 1 Each (83 mL/Hr) TPN Continuous <Continuous>  Parenteral Nutrition - Adult 1 Each (83 mL/Hr) TPN Continuous <Continuous>  piperacillin/tazobactam IVPB.. 3.375 Gram(s) IV Intermittent every 8 hours  psyllium Powder 1 Packet(s) Oral every 8 hours  sacubitril 24 mG/valsartan 26 mG 1 Tablet(s) Oral two times a day  spironolactone 25 milliGRAM(s) Oral daily  zinc sulfate 220 milliGRAM(s) Oral daily    MEDICATIONS  (PRN):  acetaminophen     Tablet .. 650 milliGRAM(s) Oral every 6 hours PRN Temp greater or equal to 38C (100.4F), Mild Pain (1 - 3)  dextrose Oral Gel 15 Gram(s) Oral once PRN Blood Glucose LESS THAN 70 milliGRAM(s)/deciliter  melatonin 3 milliGRAM(s) Oral at bedtime PRN Insomnia  ondansetron Injectable 4 milliGRAM(s) IV Push every 6 hours PRN Nausea and/or Vomiting  oxyCODONE    IR 5 milliGRAM(s) Oral every 6 hours PRN Severe Pain (7 - 10)  sodium chloride 0.9% lock flush 10 milliLiter(s) IV Push every 1 hour PRN Pre/post blood products, medications, blood draw, and to maintain line patency    Admitting Anthropometrics:    pounds +-10%  Wt 142 pounds BMI 16.4 %IBW=66%     Weight Change:    141.9 pounds - dosing wt    136 pounds - Bedscale wt     **PCM:  >> Reports having 1-2 meals/day + ONS. Per pt claims to have 2 ensure/day and 2 nutrament/day - unclear how accurate this is. ?If pt meeting ~75% EER consistently.  >> Does report wt loss.  pounds x6 months ago. Thinks he now is 135 pounds which is consistent with bedscale wt obtained during initial visit by  pounds. Suggest wt loss of 49 pounds/26% body wt loss.  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     Estimated energy needs:  Current body wt for EER based on clinician judgment. Adjust for age, malnutrition, EF, S/p OR, Pressure ulcer; fluids per team  25-30kcal/k-1950kcal/day   1.3-1.5gm/k-98gm prot/day   30-35kcal/k-2275kcal/day   **Rec upper end of needs     Subjective:  77M with PMHx of IVDU found unresponsive at his nursing home, resolved after Narcan in the field and brought to [facility]. Found to have PE, atrial flutter, and large LV thrombus on echo. Transferred to [facility] for further management. Pt c/o abdominal pain on 12/10 CTA showing mid SMA with embolus. Abnormal distal small bowel loops and cecum with dilatation and pneumatosis suggesting infarcted bowel. One or two tiny foci of intrahepatic portal vein pneumatosis. Segmental infarction upper pole left kidney. Now s/p Ex lap, SMA embolectomy, 80cm SBR, abthera vac left in discontinuity () and transferred to SICU intubated. S/p second look () and most recently s/p OR for 3rd look, end-to-end anastomosis of remaining bowel, loop ileostomy and abdomen closure (12/15). Transferred to CCU on  for cardiac optimization and now transferred back to SICU  for bleeding hemodynamic instability. Echo  shows EF 10-15% with overall hypokinesis. CTA performed demonstrating large hematoma in R abdomen, new hemoperitoneum, and bilateral pleural effusions. Remains in SICU, now extubated with still with limb ischemia to LLE pending amputation and AC held given BRBPR and hematoma; noted pt agreeable for AKA when feasible - AKA currently Pending Cards. Pt s/p DCCV on  (negative LORENZO on ) with successful conversion to NSR. Planning for AKA with vascular surgery once nutrition status is optimize. Team placed PICC 1/10 and initiated supplemental TPN now weaned off with constant encouragement of PO intake. Per CM note. medically ready for discharge with acceptance to [facility]. Plan to place central line when feasible and restart TPN- please see recs below. Disc with team. Possible OR next week for ileostomy reversal     On assessment, pt resting in bed. Currently pt is on regular diet with Ensure Enlive TID (1050 kcal, 60g protein, 540mL free H2O). Overall appetite remains poor meeting <50% est needs. Cont to remain very limited with food choices- RD attempted to review food prefernces, and pt refusing most food items at [facility]. Family brings in food from home- Cont to have family provide pt with his own food preferences as dislikes most foods on menu provided. Remains on PPN supplementing PO intake. Plan to restart TPN when feasible, please see recs below pending PICC placement per disc with RN this am. RD cont to encourage adequate PO intake as tolerated. RD to follow.     Prior PES: Malnutrition Severe in Chronic state RT presumed intake<EER AEB NFPE, wt loss, PO intake  >>on going  Goal: Pt will meet at least 75% of nutrient needs via feasible route / Show no further s/s of malnutrition    Recommendations:  1. Cont with regular diet with Ensure Enlive TID (1050 kcal, 60g protein, 540mL free H2O)  2. Due to persistent malabsorption, If team able to place PICC and start TPN, would recommend meeting 100% of est needs until outpt via ostomy improves. Recommend; 275g Dex, 90g AA, 50g SMOF lipids to provide 1795 kcal, 90g pro, GIR 2.93, 1.38 g/kg pro ABW. RD to follow.  >> Cont to trend TG weekly  3. Recommend addition of MVI daily  4. Monitor Skin, Wts daily, GOC. Pain/GI per team.   >>Cont with imodium and metamucil per team  5. Labs: monitor BMP, CBC, glucose, lytes - Replete PRN, trend renal indices, LFts, POCT.  6. RD to remain available for additional Recs.    **Disc with team    Education:  Cont to encourage adequate PO intake to prevent further s/sx of malnutrition/ healthy weight gain    Risk Level: High [X   ] Moderate [   ] Low [   ].

## 2023-02-06 NOTE — PROGRESS NOTE ADULT - ASSESSMENT
77M first presented to [facility] from [facility] due to unresponsiveness (responded to Narcan). At [facility], found to have subsegmental pulmonary embolism in RUL, rapid atrial flutter with severely reduced LVEF with LV thrombus. Transferred to [facility] on 12/7/22 for LORENZO and possible ablation. At [facility], was found to have left leg ischemia (left leg arterial thrombus on LE duplex on 12/8). Was being considered for rhythm control when he developed abdominal pain, CTA (12/10/22) showed embolus in SMA, bowel infarct, requiring ex-lap on 12/11 with SMA embolectomy, 80cm small bowel resection; On 12/13, underwent 2nd look laparotomy, with 80cm small bowel resection, closure with abthera. On 12/15, underwent 3rd look laparotomy, end-to-end anastomosis of remaining bowel, loop ileostomy and abdominal closure. Now extubated, on tele floor. Eventually underwent LORENZO/DCCV on 12/30/22, now in sinus rhythm. Currently being evaluated for possible above knee amputation for LLE ischemia (possibly deferring till next hospitalization, after optimization of nutritional status).      #Fever  #Leukocytosis   -Pt with CT abd/pelvis which showed fluid collection. Pt was seen by IR re: fluid collection however the pt's imaging was reviewed by IR and it was determined that the fluid collection is not amenable to percutaneous drainage d/t bowel obstructing a safe window.  - Continue to monitor WBC, patient has been refusing labs   - Appreciate ID. On Pip/Tazo currently  - possible OR this week     #Hypotension  Difficult to assess etiology in view of comorbidities and lack of labs over the last few days. Primary team attempting to obtain labs, however patient has been refusing.   Improving BP, optimize patient volume status.     #Hyponatremia   - worsening hyponatremia, get Urine studies, osmolality. Optimize volume status  Get CHEM today, further recommendations patient above     # Left leg ischemia   - plan per vascular and primary team    #Suicidal Ideation  #Insomnia   - Pt was placed back on 1:1 observation for suicidality.   - Continuing mirtazapine to 30mg QHS for ongoing depressive sx and insomnia    # Acute Systolic Heart Failure   - Metoprolol succinate 25mg qd - continue    # Atrial Flutter  - s/p DCCV 12/30  - EP recs - uninterrupted AC x 30 days ; January 29th was 30 days post DCCV.   -Continue Lovenox, amiodarone and metoprolol succinate    # Pulmonary embolism (subsegmental RUL)   - After completion of AC for DCCV, would need to continue AC for PE - continuing lovenox at this time  - still minimally tachycardic - potentially presence of PE contributes to tachycardia. Otherwise hemodynamics are stable.     # Bowel ischemia - s/p SMA embolectomy; Small bowel resection  - ileostomy in place  - plan of primary team  - loperamide and diphenoxylate atropine    # Severe protein-calorie malnutrition  # Sacral wound   - needs nutritional optimization  - Wound care per nursing protocol     #DVT ppx -  lovenox for PE   Discussed with primary team

## 2023-02-06 NOTE — PROGRESS NOTE ADULT - SUBJECTIVE AND OBJECTIVE BOX
SUBJECTIVE:  Doing well this AM. NAEON. Denies n/v. Endorses f/bm. Denies cp/sob. Tolerating diet.    MEDICATIONS  (STANDING):  aMIOdarone    Tablet 100 milliGRAM(s) Oral every 24 hours  ascorbic acid 500 milliGRAM(s) Oral daily  chlorhexidine 2% Cloths 1 Application(s) Topical <User Schedule>  chlorhexidine 2% Cloths 1 Application(s) Topical <User Schedule>  dextrose 5%. 1000 milliLiter(s) (50 mL/Hr) IV Continuous <Continuous>  dextrose 5%. 1000 milliLiter(s) (100 mL/Hr) IV Continuous <Continuous>  diphenoxylate/atropine 2 Tablet(s) Oral every 8 hours  enoxaparin Injectable 60 milliGRAM(s) SubCutaneous every 12 hours  fat emulsion (Fish Oil and Plant Based) 20% Infusion 0.7764 Gm/kG/Day (20.8 mL/Hr) IV Continuous <Continuous>  glucagon  Injectable 1 milliGRAM(s) IntraMuscular once  influenza  Vaccine (HIGH DOSE) 0.7 milliLiter(s) IntraMuscular once  insulin lispro (ADMELOG) corrective regimen sliding scale   SubCutaneous every 6 hours  loperamide 4 milliGRAM(s) Oral every 8 hours  metoprolol succinate ER 25 milliGRAM(s) Oral daily  mirtazapine 30 milliGRAM(s) Oral at bedtime  multivitamin 1 Tablet(s) Oral daily  opium Tincture 6 milliGRAM(s) Oral every 12 hours  pantoprazole    Tablet 40 milliGRAM(s) Oral two times a day  Parenteral Nutrition - Adult 1 Each (83 mL/Hr) TPN Continuous <Continuous>  piperacillin/tazobactam IVPB.. 3.375 Gram(s) IV Intermittent every 8 hours  psyllium Powder 1 Packet(s) Oral every 8 hours  sacubitril 24 mG/valsartan 26 mG 1 Tablet(s) Oral two times a day  silver sulfADIAZINE 1% Cream 1 Application(s) Topical daily  spironolactone 25 milliGRAM(s) Oral daily  zinc sulfate 220 milliGRAM(s) Oral daily    MEDICATIONS  (PRN):  acetaminophen     Tablet .. 650 milliGRAM(s) Oral every 6 hours PRN Temp greater or equal to 38C (100.4F), Mild Pain (1 - 3)  dextrose Oral Gel 15 Gram(s) Oral once PRN Blood Glucose LESS THAN 70 milliGRAM(s)/deciliter  melatonin 3 milliGRAM(s) Oral at bedtime PRN Insomnia  ondansetron Injectable 4 milliGRAM(s) IV Push every 6 hours PRN Nausea and/or Vomiting  oxyCODONE    IR 5 milliGRAM(s) Oral every 6 hours PRN Severe Pain (7 - 10)  sodium chloride 0.9% lock flush 10 milliLiter(s) IV Push every 1 hour PRN Pre/post blood products, medications, blood draw, and to maintain line patency      Vital Signs Last 24 Hrs  T(C): 36.6 (06 Feb 2023 09:14), Max: 37.9 (05 Feb 2023 22:39)  T(F): 97.8 (06 Feb 2023 09:14), Max: 100.3 (05 Feb 2023 22:39)  HR: 98 (06 Feb 2023 08:22) (96 - 106)  BP: 109/55 (06 Feb 2023 08:22) (87/51 - 119/53)  BP(mean): 79 (06 Feb 2023 08:22) (62 - 79)  RR: 18 (06 Feb 2023 08:22) (18 - 18)  SpO2: 60% (06 Feb 2023 08:22) (60% - 100%)    Parameters below as of 06 Feb 2023 08:22  Patient On (Oxygen Delivery Method): room air        Physical Exam:  General: NAD, resting comfortably in bed  Pulmonary: Nonlabored breathing, no respiratory distress  Cardiovascular: NSR  Abdominal: soft, NT/ND, ostomy functioning and in place  Extremities: WWP, normal strength, dry gangrene to below knee LLE  Neuro: A/O x 3, CNs II-XII grossly intact, no focal deficits    I&O's Summary    05 Feb 2023 07:01  -  06 Feb 2023 07:00  --------------------------------------------------------  IN: 996 mL / OUT: 3180 mL / NET: -2184 mL    06 Feb 2023 07:01 - 06 Feb 2023 09:38  --------------------------------------------------------  IN: 166 mL / OUT: 350 mL / NET: -184 mL        LABS:    02-05    125<L>  |  95<L>  |  31<H>  ----------------------------<  119<H>  4.2   |  20<L>  |  0.72    Ca    8.5      05 Feb 2023 14:34          CAPILLARY BLOOD GLUCOSE      POCT Blood Glucose.: 159 mg/dL (05 Feb 2023 17:02)

## 2023-02-06 NOTE — PROGRESS NOTE ADULT - SUBJECTIVE AND OBJECTIVE BOX
Patient is a 78y old  Male who presents with a chief complaint of A flutter (06 Feb 2023 09:37)    INTERVAL EVENTS:    SUBJECTIVE:  Patient was seen and examined at bedside. Patient denies any complaints. BP improved, refused labs this morning. No other complaints or events reported.     Review of systems: No fever, chills, dizziness, HA, Changes in vision, CP, dyspnea, nausea or vomiting, dysuria, changes in bowel movements, LE edema. Rest of 12 point Review of systems negative unless otherwise documented elsewhere in note.     Diet, Regular:     Start Time: 19:00  Supplement Feeding Modality:  Oral  Ensure Enlive Cans or Servings Per Day:  1       Frequency:  Three Times a day (01-20-23 @ 06:53) [Active]      MEDICATIONS:  MEDICATIONS  (STANDING):  aMIOdarone    Tablet 100 milliGRAM(s) Oral every 24 hours  ascorbic acid 500 milliGRAM(s) Oral daily  chlorhexidine 2% Cloths 1 Application(s) Topical <User Schedule>  chlorhexidine 2% Cloths 1 Application(s) Topical <User Schedule>  dextrose 5%. 1000 milliLiter(s) (50 mL/Hr) IV Continuous <Continuous>  dextrose 5%. 1000 milliLiter(s) (100 mL/Hr) IV Continuous <Continuous>  diphenoxylate/atropine 2 Tablet(s) Oral every 8 hours  enoxaparin Injectable 60 milliGRAM(s) SubCutaneous every 12 hours  fat emulsion (Fish Oil and Plant Based) 20% Infusion 0.7764 Gm/kG/Day (20.8 mL/Hr) IV Continuous <Continuous>  glucagon  Injectable 1 milliGRAM(s) IntraMuscular once  influenza  Vaccine (HIGH DOSE) 0.7 milliLiter(s) IntraMuscular once  insulin lispro (ADMELOG) corrective regimen sliding scale   SubCutaneous every 6 hours  loperamide 4 milliGRAM(s) Oral every 8 hours  metoprolol succinate ER 25 milliGRAM(s) Oral daily  mirtazapine 30 milliGRAM(s) Oral at bedtime  multivitamin 1 Tablet(s) Oral daily  opium Tincture 6 milliGRAM(s) Oral every 12 hours  pantoprazole    Tablet 40 milliGRAM(s) Oral two times a day  Parenteral Nutrition - Adult 1 Each (83 mL/Hr) TPN Continuous <Continuous>  Parenteral Nutrition - Adult 1 Each (83 mL/Hr) TPN Continuous <Continuous>  piperacillin/tazobactam IVPB.. 3.375 Gram(s) IV Intermittent every 8 hours  psyllium Powder 1 Packet(s) Oral every 8 hours  sacubitril 24 mG/valsartan 26 mG 1 Tablet(s) Oral two times a day  spironolactone 25 milliGRAM(s) Oral daily  zinc sulfate 220 milliGRAM(s) Oral daily    MEDICATIONS  (PRN):  acetaminophen     Tablet .. 650 milliGRAM(s) Oral every 6 hours PRN Temp greater or equal to 38C (100.4F), Mild Pain (1 - 3)  dextrose Oral Gel 15 Gram(s) Oral once PRN Blood Glucose LESS THAN 70 milliGRAM(s)/deciliter  melatonin 3 milliGRAM(s) Oral at bedtime PRN Insomnia  ondansetron Injectable 4 milliGRAM(s) IV Push every 6 hours PRN Nausea and/or Vomiting  oxyCODONE    IR 5 milliGRAM(s) Oral every 6 hours PRN Severe Pain (7 - 10)  sodium chloride 0.9% lock flush 10 milliLiter(s) IV Push every 1 hour PRN Pre/post blood products, medications, blood draw, and to maintain line patency      Allergies    No Known Allergies    Intolerances        OBJECTIVE:  Vital Signs Last 24 Hrs  T(C): 36.6 (06 Feb 2023 09:14), Max: 37.9 (05 Feb 2023 22:39)  T(F): 97.8 (06 Feb 2023 09:14), Max: 100.3 (05 Feb 2023 22:39)  HR: 110 (06 Feb 2023 11:49) (98 - 110)  BP: 119/57 (06 Feb 2023 11:49) (87/51 - 119/57)  BP(mean): 82 (06 Feb 2023 11:49) (62 - 82)  RR: 17 (06 Feb 2023 11:15) (17 - 18)  SpO2: 94% (06 Feb 2023 11:49) (60% - 100%)    Parameters below as of 06 Feb 2023 11:49  Patient On (Oxygen Delivery Method): room air      I&O's Summary    05 Feb 2023 07:01  -  06 Feb 2023 07:00  --------------------------------------------------------  IN: 996 mL / OUT: 3180 mL / NET: -2184 mL    06 Feb 2023 07:01  -  06 Feb 2023 12:21  --------------------------------------------------------  IN: 166 mL / OUT: 350 mL / NET: -184 mL        PHYSICAL EXAM:  HEENT: AT/NC, no facial asymmetry  Lungs: poor inspiration, no crackles, no wheezes  Heart: tachycardic  Abdomen: soft, non-tender  Extremities: left leg dry gangrene, no gross focal deficit     LABS:    02-05    125<L>  |  95<L>  |  31<H>  ----------------------------<  119<H>  4.2   |  20<L>  |  0.72    Ca    8.5      05 Feb 2023 14:34          CAPILLARY BLOOD GLUCOSE      POCT Blood Glucose.: 159 mg/dL (05 Feb 2023 17:02)        MICRODATA:      RADIOLOGY/OTHER STUDIES:

## 2023-02-06 NOTE — PROGRESS NOTE ADULT - SUBJECTIVE AND OBJECTIVE BOX
INTERVAL HPI/OVERNIGHT EVENTS: .  during day shift. ostomy output 1550. 750 NS bolus given. CXR ordered.     SUBJECTIVE:  pt seen at bedside, without complaints at this time. feeling okay.    MEDICATIONS  (STANDING):  aMIOdarone    Tablet 100 milliGRAM(s) Oral every 24 hours  ascorbic acid 500 milliGRAM(s) Oral daily  chlorhexidine 2% Cloths 1 Application(s) Topical <User Schedule>  chlorhexidine 2% Cloths 1 Application(s) Topical <User Schedule>  dextrose 5%. 1000 milliLiter(s) (50 mL/Hr) IV Continuous <Continuous>  dextrose 5%. 1000 milliLiter(s) (100 mL/Hr) IV Continuous <Continuous>  diphenoxylate/atropine 2 Tablet(s) Oral every 8 hours  enoxaparin Injectable 60 milliGRAM(s) SubCutaneous every 12 hours  fat emulsion (Fish Oil and Plant Based) 20% Infusion 0.7764 Gm/kG/Day (20.8 mL/Hr) IV Continuous <Continuous>  glucagon  Injectable 1 milliGRAM(s) IntraMuscular once  influenza  Vaccine (HIGH DOSE) 0.7 milliLiter(s) IntraMuscular once  insulin lispro (ADMELOG) corrective regimen sliding scale   SubCutaneous every 6 hours  loperamide 4 milliGRAM(s) Oral every 8 hours  metoprolol succinate ER 25 milliGRAM(s) Oral daily  mirtazapine 30 milliGRAM(s) Oral at bedtime  multivitamin 1 Tablet(s) Oral daily  opium Tincture 6 milliGRAM(s) Oral every 12 hours  pantoprazole    Tablet 40 milliGRAM(s) Oral two times a day  Parenteral Nutrition - Adult 1 Each (83 mL/Hr) TPN Continuous <Continuous>  piperacillin/tazobactam IVPB.. 3.375 Gram(s) IV Intermittent every 8 hours  psyllium Powder 1 Packet(s) Oral every 8 hours  sacubitril 24 mG/valsartan 26 mG 1 Tablet(s) Oral two times a day  silver sulfADIAZINE 1% Cream 1 Application(s) Topical daily  spironolactone 25 milliGRAM(s) Oral daily  zinc sulfate 220 milliGRAM(s) Oral daily    MEDICATIONS  (PRN):  acetaminophen     Tablet .. 650 milliGRAM(s) Oral every 6 hours PRN Temp greater or equal to 38C (100.4F), Mild Pain (1 - 3)  dextrose Oral Gel 15 Gram(s) Oral once PRN Blood Glucose LESS THAN 70 milliGRAM(s)/deciliter  melatonin 3 milliGRAM(s) Oral at bedtime PRN Insomnia  ondansetron Injectable 4 milliGRAM(s) IV Push every 6 hours PRN Nausea and/or Vomiting  oxyCODONE    IR 5 milliGRAM(s) Oral every 6 hours PRN Severe Pain (7 - 10)  sodium chloride 0.9% lock flush 10 milliLiter(s) IV Push every 1 hour PRN Pre/post blood products, medications, blood draw, and to maintain line patency      Vital Signs Last 24 Hrs  T(C): 36.2 (06 Feb 2023 04:48), Max: 37.9 (05 Feb 2023 22:39)  T(F): 97.2 (06 Feb 2023 04:48), Max: 100.3 (05 Feb 2023 22:39)  HR: 98 (06 Feb 2023 04:09) (96 - 106)  BP: 119/53 (06 Feb 2023 04:09) (64/45 - 119/53)  BP(mean): 76 (06 Feb 2023 04:09) (51 - 76)  RR: 18 (05 Feb 2023 20:41) (18 - 18)  SpO2: 99% (06 Feb 2023 04:09) (96% - 100%)    Parameters below as of 06 Feb 2023 04:09  Patient On (Oxygen Delivery Method): room air        PHYSICAL EXAM:      Constitutional: A&Ox3    Respiratory: non labored breathing, no respiratory distress    Cardiovascular: NSR, RRR    Gastrointestinal: soft, nontender, nondistended, incision healed well. ostomy pink and patent with fecal output    Extremities: (-) edema                  I&O's Detail    05 Feb 2023 07:01  -  06 Feb 2023 07:00  --------------------------------------------------------  IN:    PPN (Peripheral Parenteral Nutrition): 996 mL  Total IN: 996 mL    OUT:    Ileostomy (mL): 2350 mL    Voided (mL): 830 mL  Total OUT: 3180 mL    Total NET: -2184 mL          LABS:    02-05    125<L>  |  95<L>  |  31<H>  ----------------------------<  119<H>  4.2   |  20<L>  |  0.72    Ca    8.5      05 Feb 2023 14:34            RADIOLOGY & ADDITIONAL STUDIES:

## 2023-02-06 NOTE — BH CONSULTATION LIAISON PROGRESS NOTE - NSBHASSESSMENTFT_PSY_ALL_CORE
Patient is a 78 year old male with history of substance use and no significant past medical history (poor medical follow up, last PCP visit 20 years prior to admission), presenting with unresponsiveness from nursing home to [facility], found to be in Aflutter w RVR with subsegmental PE RUL, transferred to [facility], found to have LV thrombus and worsening HF, EF 10-15%. 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 Course further complicated by LLE pulselessness and ulcerations that will ultimately require BKA. Psychiatry consulted due to chronic depressed mood in the hospital and suicidality. Per staff pt verbalizes Si without intent, when he experiences high physical distress (pain). Upon re-assessment today patient presents with similarly low mood and hopelessness about his medical conditions, slightly more fatigued and with some confusion (disoriented to the day of the week). He continues to endorse feeling motivated to engage in treatment and work on improving his physical and nutritional state and denies active SI. Patient's past statements of wanting to die appear to be triggered by moments of high distress due to pain and frustration with lack of improvement in his physical state and not actual suicidal statements.    Plan:  -patient does not require 1:1 observation for acute suicidality; consider nursing enhanced supervision   -patient to be followed daily by the Psychology team for supportive psychotherapy    -continue mirtazapine 30mg QHS for ongoing depressive sx and insomnia, will observe for response  -Encourage sleep hygiene and limiting sleep during the day    -Delirium precautions  -Psychology to follow the pt daily for supportive psychotherapy

## 2023-02-07 NOTE — PROGRESS NOTE ADULT - ASSESSMENT
77M with PMHx of IVDU found unresponsive at his nursing home, resolved after Narcan in the field and brought to [facility]. Found to have PE, atrial flutter, and large LV thrombus on echo. Transferred to [facility] for further management. Pt c/o abdominal pain on 12/10 CTA showing mid SMA with embolus. Abnormal distal small bowel loops and cecum with dilatation and pneumatosis suggesting infarcted bowel. One or two tiny foci of intrahepatic portal vein pneumatosis. Segmental infarction upper pole left kidney. Now s/p Ex lap, SMA embolectomy, 80cm SBR, abthera vac left in discontinuity (12/11) and transferred to SICU intubated. S/p second look (12/13) and most recently s/p OR for 3rd look, end-to-end anastomosis of remaining bowel, loop ileostomy and abdomen closure (12/15). Now stepped down to telemtry. LLE ischemia, AKA delayed by nutritional optimization.    Regular diet w/ ensures  PPN  Zosyn  Pain/nausea control  A flutter now s/p cardioversion, amio 100 QD, toprol XL 25 QD  LV thrombus w LLE limb ischemia  Lovenox  OOBA/IS  AKA likely outpatient  AM labs  Dispo: optimize nutrition status, possible OR next week for ileostomy reversal

## 2023-02-07 NOTE — PROGRESS NOTE ADULT - SUBJECTIVE AND OBJECTIVE BOX
Resting comfortably in bed.  No reported issues overnight.       Remaining ROS negative       PHYSICAL EXAM:    PHYSICAL EXAM:    General: no acute distress, resting in bed  HEENT: AT/NC, no facial asymmetry  Lungs: breathing is nonlabored  Heart: not tachycardic today, s1s2  Abdomen: soft, non-tender  Extremities: left leg dry gangrene, no gross focal deficit     VITAL SIGNS:  Vital Signs Last 24 Hrs  T(C): 37.7 (2023 09:00), Max: 38.3 (2023 15:26)  T(F): 99.8 (2023 09:00), Max: 101 (2023 15:26)  HR: 98 (2023 12:00) (90 - 120)  BP: 132/58 (2023 12:00) (100/55 - 132/58)  BP(mean): 84 (2023 12:00) (69 - 84)  RR: 18 (2023 12:00) (15 - 18)  SpO2: 100% (2023 12:00) (95% - 100%)    Parameters below as of 2023 12:00  Patient On (Oxygen Delivery Method): room air          MEDICATIONS:  MEDICATIONS  (STANDING):  aMIOdarone    Tablet 100 milliGRAM(s) Oral every 24 hours  ascorbic acid 500 milliGRAM(s) Oral daily  chlorhexidine 2% Cloths 1 Application(s) Topical <User Schedule>  chlorhexidine 2% Cloths 1 Application(s) Topical <User Schedule>  dextrose 5%. 1000 milliLiter(s) (50 mL/Hr) IV Continuous <Continuous>  dextrose 5%. 1000 milliLiter(s) (100 mL/Hr) IV Continuous <Continuous>  diphenoxylate/atropine 2 Tablet(s) Oral every 8 hours  enoxaparin Injectable 60 milliGRAM(s) SubCutaneous every 12 hours  fat emulsion (Fish Oil and Plant Based) 20% Infusion 0.7764 Gm/kG/Day (20.83 mL/Hr) IV Continuous <Continuous>  glucagon  Injectable 1 milliGRAM(s) IntraMuscular once  influenza  Vaccine (HIGH DOSE) 0.7 milliLiter(s) IntraMuscular once  insulin lispro (ADMELOG) corrective regimen sliding scale   SubCutaneous every 6 hours  loperamide 4 milliGRAM(s) Oral every 8 hours  metoprolol succinate ER 25 milliGRAM(s) Oral daily  mirtazapine 30 milliGRAM(s) Oral at bedtime  multivitamin 1 Tablet(s) Oral daily  opium Tincture 6 milliGRAM(s) Oral every 12 hours  pantoprazole    Tablet 40 milliGRAM(s) Oral two times a day  Parenteral Nutrition - Adult 1 Each (83 mL/Hr) TPN Continuous <Continuous>  Parenteral Nutrition - Adult 1 Each (83 mL/Hr) TPN Continuous <Continuous>  piperacillin/tazobactam IVPB.. 3.375 Gram(s) IV Intermittent every 8 hours  psyllium Powder 1 Packet(s) Oral every 8 hours  sacubitril 24 mG/valsartan 26 mG 1 Tablet(s) Oral two times a day  spironolactone 25 milliGRAM(s) Oral daily  zinc sulfate 220 milliGRAM(s) Oral daily    MEDICATIONS  (PRN):  acetaminophen     Tablet .. 650 milliGRAM(s) Oral every 6 hours PRN Temp greater or equal to 38C (100.4F), Mild Pain (1 - 3)  dextrose Oral Gel 15 Gram(s) Oral once PRN Blood Glucose LESS THAN 70 milliGRAM(s)/deciliter  melatonin 3 milliGRAM(s) Oral at bedtime PRN Insomnia  ondansetron Injectable 4 milliGRAM(s) IV Push every 6 hours PRN Nausea and/or Vomiting  oxyCODONE    IR 5 milliGRAM(s) Oral every 6 hours PRN Severe Pain (7 - 10)  sodium chloride 0.9% lock flush 10 milliLiter(s) IV Push every 1 hour PRN Pre/post blood products, medications, blood draw, and to maintain line patency      ALLERGIES:  Allergies    No Known Allergies    Intolerances        LABS:                        7.4    11.86 )-----------( 577      ( 2023 05:30 )             22.8     02-07    136  |  101  |  26<H>  ----------------------------<  128<H>  3.8   |  25  |  0.69    Ca    8.3<L>      2023 05:30  Phos  2.6     02-07  Mg     1.9     02-07    TPro  6.7  /  Alb  2.1<L>  /  TBili  0.7  /  DBili  x   /  AST  20  /  ALT  27  /  AlkPhos  154<H>        Urinalysis Basic - ( 2023 16:16 )    Color: Yellow / Appearance: SL Cloudy / S.015 / pH: x  Gluc: x / Ketone: NEGATIVE  / Bili: Negative / Urobili: 0.2 E.U./dL   Blood: x / Protein: 30 mg/dL / Nitrite: NEGATIVE   Leuk Esterase: Trace / RBC: < 5 /HPF / WBC < 5 /HPF   Sq Epi: x / Non Sq Epi: 0-5 /HPF / Bacteria: Many /HPF      CAPILLARY BLOOD GLUCOSE      POCT Blood Glucose.: 159 mg/dL (2023 17:02)      RADIOLOGY & ADDITIONAL TESTS: Reviewed.

## 2023-02-07 NOTE — PROGRESS NOTE ADULT - SUBJECTIVE AND OBJECTIVE BOX
STATUS POST:    : Ex lap, SMA embolectomy, 80cm SBR, abthera vac  : Second look, SBR  12/15: Ex-lap, no dead gut, DNAIEL of discontinuous gut, loop ileostomy; EBL minimal, 1L crystalloid, UOP 150mL        SUBJECTIVE: Patient seen and examined bedside by chief resident. patient still refusing labs. explained the importance of checking labs daily to maintain electrolyte status and optimize nutrition adequately     aMIOdarone    Tablet 100 milliGRAM(s) Oral every 24 hours  enoxaparin Injectable 60 milliGRAM(s) SubCutaneous every 12 hours  metoprolol succinate ER 25 milliGRAM(s) Oral daily  piperacillin/tazobactam IVPB.. 3.375 Gram(s) IV Intermittent every 8 hours  sacubitril 24 mG/valsartan 26 mG 1 Tablet(s) Oral two times a day  spironolactone 25 milliGRAM(s) Oral daily      Vital Signs Last 24 Hrs  T(C): 36.2 (2023 04:34), Max: 38.3 (2023 15:26)  T(F): 97.1 (2023 04:34), Max: 101 (2023 15:26)  HR: 98 (2023 06:51) (94 - 120)  BP: 103/51 (2023 06:51) (102/57 - 131/56)  BP(mean): 71 (2023 06:51) (69 - 83)  RR: 17 (2023 06:51) (15 - 18)  SpO2: 100% (2023 06:51) (60% - 100%)    Parameters below as of 2023 06:51  Patient On (Oxygen Delivery Method): room air      I&O's Detail    2023 07:01  -  2023 07:00  --------------------------------------------------------  IN:    Fat Emulsion (Fish Oil &amp; Plant Based) 20% Infusion: 20.8 mL    IV PiggyBack: 100 mL    Lactated Ringers Bolus: 500 mL    Lactated Ringers Bolus: 250 mL    Lactated Ringers Bolus: 250 mL    Lactated Ringers Bolus: 250 mL    Lactated Ringers Bolus: 250 mL    Oral Fluid: 610 mL    PPN (Peripheral Parenteral Nutrition): 1909 mL  Total IN: 4139.8 mL    OUT:    Ileostomy (mL): 2765 mL    Voided (mL): 990 mL  Total OUT: 3755 mL    Total NET: 384.8 mL          General: NAD, resting comfortably in bed  C/V: NSR  Pulm: Nonlabored breathing, no respiratory distress  Abd: soft, NT/ND. ileostomy with output  Extrem: WWP, no edema, SCDs in place        LABS:                        7.4    11.86 )-----------( 577      ( 2023 05:30 )             22.8     02-05    125<L>  |  95<L>  |  31<H>  ----------------------------<  119<H>  4.2   |  20<L>  |  0.72    Ca    8.5      2023 14:34        Urinalysis Basic - ( 2023 16:16 )    Color: Yellow / Appearance: SL Cloudy / S.015 / pH: x  Gluc: x / Ketone: NEGATIVE  / Bili: Negative / Urobili: 0.2 E.U./dL   Blood: x / Protein: 30 mg/dL / Nitrite: NEGATIVE   Leuk Esterase: Trace / RBC: < 5 /HPF / WBC < 5 /HPF   Sq Epi: x / Non Sq Epi: 0-5 /HPF / Bacteria: Many /HPF        RADIOLOGY & ADDITIONAL STUDIES:

## 2023-02-07 NOTE — BH CONSULTATION LIAISON PROGRESS NOTE - NSBHFUPINTERVALHXFT_PSY_A_CORE
Pt is a 78-year-old Black, cisgender male with prolonged hospitalization, mesenteric ischemia s/p SBR 12/2022, c/b intra-abdominal hematoma, LE gangrene pending possible AKA (pending nutritional optimization, currently experiencing malnutrition/malabsorption).    C/L psychology intern met with pt for 30-minute individual psychotherapy session. Upon approach, pt was awake, lying in bed, and eating cut fruit from his lunch tray. Pt was observed chewing the pieces of fruit and spitting out the solid parts of the fruit, stating that they were too hard to swallow. Pt repeatedly stated "I don't understand," expressing disbelief and helplessness around his condition and wanting "real food...from a restaurant." Writer and pt discussed asking pt's sister to bring a fresh, hot meal from a restaurant. Pt stated that he has had difficulty communicating this wish to his sister, and gave psychiatry team permission to call his sister to speak to her about possibly bringing food to pt. Writer provided empathic, active, reflective listening, as well as validation of pt's internal conflict around wanting to live but feeling he is unable to take the necessary steps toward recovery. Pt denied SI/HI/SIB/AH/VH/PI.

## 2023-02-07 NOTE — BH CONSULTATION LIAISON PROGRESS NOTE - NSBHASSESSMENTFT_PSY_ALL_CORE
Patient is a 78 year old male with history of substance use and no significant past medical history (poor medical follow up, last PCP visit 20 years prior to admission), presenting with unresponsiveness from nursing home to [facility], found to be in Aflutter w RVR with subsegmental PE RUL, transferred to [facility], found to have LV thrombus and worsening HF, EF 10-15%. 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 Course further complicated by LLE pulselessness and ulcerations that will ultimately require BKA. Psychiatry consulted due to chronic depressed mood in the hospital and suicidality. Per staff pt verbalizes Si without intent, when he experiences high physical distress (pain). Upon re-assessment today patient presents with similarly low mood and hopelessness about his medical conditions, slightly more fatigued and with some confusion (disoriented to the day of the week). He continues to endorse feeling motivated to engage in treatment and work on improving his physical and nutritional state and denies active SI. Patient's past statements of wanting to die appear to be triggered by moments of high distress due to pain and frustration with lack of improvement in his physical state and not actual suicidal statements.    Plan:  -patient does not require 1:1 observation for acute suicidality; consider nursing enhanced supervision   -patient to be followed daily by the Psychology team for supportive psychotherapy    -continue mirtazapine 30mg QHS for ongoing depressive sx and insomnia, will observe for response  -Encourage sleep hygiene and limiting sleep during the day    -Delirium precautions  -Psychology to follow the pt daily for supportive psychotherapy

## 2023-02-07 NOTE — PROGRESS NOTE ADULT - ASSESSMENT
77M first presented to [facility] from [facility] due to unresponsiveness (responded to Narcan). At [facility], found to have subsegmental pulmonary embolism in RUL, rapid atrial flutter with severely reduced LVEF with LV thrombus. Transferred to [facility] on 12/7/22 for LORENZO and possible ablation. At [facility], was found to have left leg ischemia (left leg arterial thrombus on LE duplex on 12/8). Was being considered for rhythm control when he developed abdominal pain, CTA (12/10/22) showed embolus in SMA, bowel infarct, requiring ex-lap on 12/11 with SMA embolectomy, 80cm small bowel resection; On 12/13, underwent 2nd look laparotomy, with 80cm small bowel resection, closure with abthera. On 12/15, underwent 3rd look laparotomy, end-to-end anastomosis of remaining bowel, loop ileostomy and abdominal closure. Now extubated, on tele floor. Eventually underwent LORENZO/DCCV on 12/30/22, now in sinus rhythm. Currently being evaluated for possible above knee amputation for LLE ischemia (possibly deferring till next hospitalization, after optimization of nutritional status).      #Fever  #Leukocytosis   -Pt with CT abd/pelvis which showed fluid collection. Pt was seen by IR re: fluid collection however the pt's imaging was reviewed by IR and it was determined that the fluid collection is not amenable to percutaneous drainage d/t bowel obstructing a safe window.  S/p GOC discussion with palliative care, and planning for return to OR for further surgery  - Continue to monitor WBC, patient has been refusing labs   - Appreciate ID. On Pip/Tazo currently  - possible OR this week     #Hypotension  Difficult to assess etiology in view of comorbidities and lack of labs over the last few days. Primary team attempting to obtain labs, however patient has been refusing.   Improving BP, optimize patient volume status.     #Hyponatremia   - improved today - 136    # Left leg ischemia   - plan per vascular and primary team    #Suicidal Ideation  #Insomnia   - Pt was placed back on 1:1 observation for suicidality.   - Continuing mirtazapine to 30mg QHS for ongoing depressive sx and insomnia    # Acute Systolic Heart Failure   - Metoprolol succinate 25mg qd - continue    # Atrial Flutter  - s/p DCCV 12/30  - EP recs - uninterrupted AC x 30 days ; January 29th was 30 days post DCCV.   -Continue Lovenox, amiodarone and metoprolol succinate    # Pulmonary embolism (subsegmental RUL)   - After completion of AC for DCCV, would need to continue AC for PE - continuing lovenox at this time  - still minimally tachycardic, but on exam this morning patient was no longer tachycardia - HR in the 80s - potentially presence of PE contributes to tachycardia. Otherwise hemodynamics are stable.     # Bowel ischemia - s/p SMA embolectomy; Small bowel resection  - ileostomy in place  - plan of primary team  - loperamide and diphenoxylate atropine    # Severe protein-calorie malnutrition  # Sacral wound   - needs nutritional optimization  - Wound care per nursing protocol     #DVT ppx -  lovenox for PE   Discussed with primary team

## 2023-02-07 NOTE — BH CONSULTATION LIAISON PROGRESS NOTE - NSBHATTESTCOMMENTATTENDFT_PSY_A_CORE
Patient is a 78 year old male with history of substance use and no significant past medical history (poor medical follow up, last PCP visit 20 years prior to admission), presenting with unresponsiveness from nursing home to [facility], found to be in Aflutter w RVR with subsegmental PE RUL, transferred to [facility], found to have LV thrombus and worsening HF, EF 10-15%. 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 Course further complicated by LLE pulselessness and ulcerations that will ultimately require BKA. Psychiatry consulted due to chronic depressed mood in the hospital and suicidality. Per staff pt verbalizes Si without intent, when he experiences high physical distress (pain). Upon re-assessment today patient presents with similarly low mood and hopelessness about his medical conditions, slightly more fatigued and with some confusion (disoriented to the day of the week). He continues to endorse feeling motivated to engage in treatment and work on improving his physical and nutritional state and denies active SI. Patient's past statements of wanting to die appear to be triggered by moments of high distress due to pain and frustration with lack of improvement in his physical state and not actual suicidal statements.    Plan:  -patient does not require 1:1 observation for acute suicidality; consider nursing enhanced supervision   -patient to be followed daily by the Psychology team for supportive psychotherapy    -continue mirtazapine 30mg QHS for ongoing depressive sx and insomnia, will observe for response  -Encourage sleep hygiene and limiting sleep during the day    -Delirium precautions  -Psychology to follow the pt daily for supportive psychotherapy

## 2023-02-08 NOTE — PROGRESS NOTE PEDS - SUBJECTIVE AND OBJECTIVE BOX
SUBJECTIVE:  Patient seen and examined on AM rounds with chief resident. No acute events overnight. This morning, he is resting comfortably in bed. Denies any active complaints.     MEDICATIONS  (STANDING):  aMIOdarone    Tablet 100 milliGRAM(s) Oral every 24 hours  ascorbic acid 500 milliGRAM(s) Oral daily  chlorhexidine 2% Cloths 1 Application(s) Topical <User Schedule>  chlorhexidine 2% Cloths 1 Application(s) Topical <User Schedule>  cholestyramine Powder (Sugar-Free) 2 Gram(s) Oral three times a day  dextrose 5%. 1000 milliLiter(s) (50 mL/Hr) IV Continuous <Continuous>  dextrose 5%. 1000 milliLiter(s) (100 mL/Hr) IV Continuous <Continuous>  diphenoxylate/atropine 2 Tablet(s) Oral every 8 hours  enoxaparin Injectable 60 milliGRAM(s) SubCutaneous every 12 hours  fat emulsion (Fish Oil and Plant Based) 20% Infusion 0.7764 Gm/kG/Day (20.83 mL/Hr) IV Continuous <Continuous>  glucagon  Injectable 1 milliGRAM(s) IntraMuscular once  influenza  Vaccine (HIGH DOSE) 0.7 milliLiter(s) IntraMuscular once  insulin lispro (ADMELOG) corrective regimen sliding scale   SubCutaneous every 6 hours  loperamide 4 milliGRAM(s) Oral every 8 hours  metoprolol succinate ER 25 milliGRAM(s) Oral daily  mirtazapine 30 milliGRAM(s) Oral at bedtime  multivitamin 1 Tablet(s) Oral daily  opium Tincture 6 milliGRAM(s) Oral every 12 hours  pantoprazole    Tablet 40 milliGRAM(s) Oral two times a day  Parenteral Nutrition - Adult 1 Each (83 mL/Hr) TPN Continuous <Continuous>  piperacillin/tazobactam IVPB.. 3.375 Gram(s) IV Intermittent every 8 hours  psyllium Powder 1 Packet(s) Oral every 8 hours  sacubitril 24 mG/valsartan 26 mG 1 Tablet(s) Oral two times a day  spironolactone 25 milliGRAM(s) Oral daily  zinc sulfate 220 milliGRAM(s) Oral daily    MEDICATIONS  (PRN):  acetaminophen     Tablet .. 650 milliGRAM(s) Oral every 6 hours PRN Temp greater or equal to 38C (100.4F), Mild Pain (1 - 3)  dextrose Oral Gel 15 Gram(s) Oral once PRN Blood Glucose LESS THAN 70 milliGRAM(s)/deciliter  melatonin 3 milliGRAM(s) Oral at bedtime PRN Insomnia  ondansetron Injectable 4 milliGRAM(s) IV Push every 6 hours PRN Nausea and/or Vomiting  oxyCODONE    IR 5 milliGRAM(s) Oral every 6 hours PRN Severe Pain (7 - 10)  sodium chloride 0.9% lock flush 10 milliLiter(s) IV Push every 1 hour PRN Pre/post blood products, medications, blood draw, and to maintain line patency      Vital Signs Last 24 Hrs  T(C): 36.8 (2023 04:51), Max: 37.7 (2023 09:00)  T(F): 98.3 (2023 04:51), Max: 99.8 (2023 09:00)  HR: 96 (2023 03:46) (90 - 98)  BP: 138/63 (2023 03:46) (100/55 - 138/63)  BP(mean): 91 (2023 03:46) (73 - 91)  RR: 15 (2023 03:46) (15 - 18)  SpO2: 100% (2023 03:46) (96% - 100%)    Parameters below as of 2023 03:46  Patient On (Oxygen Delivery Method): room air    Physical Exam:  General: NAD, resting comfortably in bed  Pulmonary: Nonlabored breathing, no respiratory distress  Cardiovascular: NSR  Abdominal: soft, NT/ND. ileostomy with output.   Extremities: WWP, dry gangrene to below knee LLE    I&O's Summary    2023 07:01  -  2023 07:00  --------------------------------------------------------  IN: 4139.8 mL / OUT: 3755 mL / NET: 384.8 mL    2023 07:01  -  2023 06:59  --------------------------------------------------------  IN: 3480 mL / OUT: 3665 mL / NET: -185 mL        LABS:                        7.4    11.86 )-----------( 577      ( 2023 05:30 )             22.8     02    136  |  101  |  26<H>  ----------------------------<  128<H>  3.8   |  25  |  0.69    Ca    8.3<L>      2023 05:30  Phos  2.6       Mg     1.9         TPro  6.7  /  Alb  2.1<L>  /  TBili  0.7  /  DBili  x   /  AST  20  /  ALT  27  /  AlkPhos  154<H>        Urinalysis Basic - ( 2023 16:16 )    Color: Yellow / Appearance: SL Cloudy / S.015 / pH: x  Gluc: x / Ketone: NEGATIVE  / Bili: Negative / Urobili: 0.2 E.U./dL   Blood: x / Protein: 30 mg/dL / Nitrite: NEGATIVE   Leuk Esterase: Trace / RBC: < 5 /HPF / WBC < 5 /HPF   Sq Epi: x / Non Sq Epi: 0-5 /HPF / Bacteria: Many /HPF      CAPILLARY BLOOD GLUCOSE        LIVER FUNCTIONS - ( 2023 05:30 )  Alb: 2.1 g/dL / Pro: 6.7 g/dL / ALK PHOS: 154 U/L / ALT: 27 U/L / AST: 20 U/L / GGT: x       
  SUBJECTIVE: Pt seen and examined by chief resident. Pt is doing well, resting comfortably on bed. Pain controlled. No complaints at this time.    Vital Signs Last 24 Hrs  T(C): 37.4 (01 Feb 2023 04:41), Max: 38.7 (31 Jan 2023 21:51)  T(F): 99.3 (01 Feb 2023 04:41), Max: 101.6 (31 Jan 2023 21:51)  HR: 110 (01 Feb 2023 00:00) (100 - 112)  BP: 109/58 (01 Feb 2023 00:00) (96/57 - 156/77)  BP(mean): 76 (01 Feb 2023 00:00) (72 - 110)  RR: 17 (01 Feb 2023 00:00) (17 - 18)  SpO2: 95% (01 Feb 2023 00:00) (95% - 100%)    Parameters below as of 01 Feb 2023 00:00  Patient On (Oxygen Delivery Method): room air        I&O's Summary    31 Jan 2023 07:01  -  01 Feb 2023 07:00  --------------------------------------------------------  IN: 1745.5 mL / OUT: 3300 mL / NET: -1554.5 mL        Physical Exam:  General Appearance: Appears well, NAD  Pulmonary: Nonlabored breathing, no respiratory distress  Abdomen: Soft, nondisteded, nontender, ostomy in place with output  Extremities: WWP, SCD's in place     LABS:                        9.6    13.09 )-----------( 510      ( 31 Jan 2023 05:51 )             30.3     01-31    130<L>  |  100  |  16  ----------------------------<  134<H>  3.8   |  19<L>  |  0.66    Ca    8.7      31 Jan 2023 05:51  Phos  3.0     01-31  Mg     2.0     01-31    TPro  x   /  Alb  2.6<L>  /  TBili  x   /  DBili  x   /  AST  x   /  ALT  x   /  AlkPhos  x   01-31

## 2023-02-08 NOTE — PROGRESS NOTE ADULT - SUBJECTIVE AND OBJECTIVE BOX
***Note in progress***    OVERNIGHT EVENTS: NAEO    SUBJECTIVE / INTERVAL HPI: Patient seen and examined at bedside. Patient denying chest pain, SOB, palpitations, cough. Patient denies fever, chills, HA, Dizziness, change in vision/hearing, N/V, abdominal pain, diarrhea, constipation, hematochezia/melena, dysuria, hematuria, new onset weakness/numbness, LE pain and/or swelling.    Remaining ROS negative       PHYSICAL EXAM:    General: WDWN  HEENT: NC/AT; PERRL, anicteric sclera; MMM  Neck: supple  Cardiovascular: +S1/S2, RRR  Respiratory: CTA B/L; no W/R/R  Gastrointestinal: soft, NT/ND; +BSx4  Extremities: WWP; no edema, clubbing or cyanosis  Vascular: 2+ radial, DP/PT pulses B/L  Neurological: AAOx3; no focal deficits  Psychiatric: pleasant mood and affect  Dermatologic: no appreciable wounds or damage to the skin    VITAL SIGNS:  Vital Signs Last 24 Hrs  T(C): 36.9 (08 Feb 2023 14:00), Max: 37.4 (08 Feb 2023 09:00)  T(F): 98.4 (08 Feb 2023 14:00), Max: 99.4 (08 Feb 2023 09:00)  HR: 124 (08 Feb 2023 15:37) (88 - 124)  BP: 112/62 (08 Feb 2023 15:37) (112/62 - 138/63)  BP(mean): 76 (08 Feb 2023 15:37) (76 - 91)  RR: 18 (08 Feb 2023 15:37) (15 - 18)  SpO2: 100% (08 Feb 2023 15:37) (96% - 100%)    Parameters below as of 08 Feb 2023 15:37  Patient On (Oxygen Delivery Method): room air          MEDICATIONS:  MEDICATIONS  (STANDING):  aMIOdarone    Tablet 100 milliGRAM(s) Oral every 24 hours  ascorbic acid 500 milliGRAM(s) Oral daily  chlorhexidine 2% Cloths 1 Application(s) Topical <User Schedule>  chlorhexidine 2% Cloths 1 Application(s) Topical <User Schedule>  cholestyramine Powder (Sugar-Free) 2 Gram(s) Oral three times a day  dextrose 5%. 1000 milliLiter(s) (50 mL/Hr) IV Continuous <Continuous>  dextrose 5%. 1000 milliLiter(s) (100 mL/Hr) IV Continuous <Continuous>  diphenoxylate/atropine 2 Tablet(s) Oral every 8 hours  enoxaparin Injectable 60 milliGRAM(s) SubCutaneous every 12 hours  fat emulsion (Fish Oil and Plant Based) 20% Infusion 0.7764 Gm/kG/Day (20.8 mL/Hr) IV Continuous <Continuous>  glucagon  Injectable 1 milliGRAM(s) IntraMuscular once  influenza  Vaccine (HIGH DOSE) 0.7 milliLiter(s) IntraMuscular once  insulin lispro (ADMELOG) corrective regimen sliding scale   SubCutaneous every 6 hours  loperamide 4 milliGRAM(s) Oral every 8 hours  metoprolol succinate ER 25 milliGRAM(s) Oral daily  mirtazapine 30 milliGRAM(s) Oral at bedtime  multivitamin 1 Tablet(s) Oral daily  opium Tincture 6 milliGRAM(s) Oral every 12 hours  pantoprazole    Tablet 40 milliGRAM(s) Oral two times a day  Parenteral Nutrition - Adult 1 Each (83 mL/Hr) TPN Continuous <Continuous>  Parenteral Nutrition - Adult 1 Each (83 mL/Hr) TPN Continuous <Continuous>  piperacillin/tazobactam IVPB.. 3.375 Gram(s) IV Intermittent every 8 hours  psyllium Powder 1 Packet(s) Oral every 8 hours  sacubitril 24 mG/valsartan 26 mG 1 Tablet(s) Oral two times a day  spironolactone 25 milliGRAM(s) Oral daily  zinc sulfate 220 milliGRAM(s) Oral daily    MEDICATIONS  (PRN):  acetaminophen     Tablet .. 650 milliGRAM(s) Oral every 6 hours PRN Temp greater or equal to 38C (100.4F), Mild Pain (1 - 3)  dextrose Oral Gel 15 Gram(s) Oral once PRN Blood Glucose LESS THAN 70 milliGRAM(s)/deciliter  melatonin 3 milliGRAM(s) Oral at bedtime PRN Insomnia  ondansetron Injectable 4 milliGRAM(s) IV Push every 6 hours PRN Nausea and/or Vomiting  oxyCODONE    IR 5 milliGRAM(s) Oral every 6 hours PRN Severe Pain (7 - 10)  sodium chloride 0.9% lock flush 10 milliLiter(s) IV Push every 1 hour PRN Pre/post blood products, medications, blood draw, and to maintain line patency      ALLERGIES:  Allergies    No Known Allergies    Intolerances        LABS:                        7.8    9.29  )-----------( 574      ( 08 Feb 2023 10:45 )             24.4     02-08    131<L>  |  96  |  17  ----------------------------<  145<H>  3.6   |  27  |  0.55    Ca    8.7      08 Feb 2023 10:45  Phos  2.6     02-08  Mg     1.9     02-08    TPro  6.6  /  Alb  2.1<L>  /  TBili  0.5  /  DBili  x   /  AST  17  /  ALT  26  /  AlkPhos  160<H>  02-08        CAPILLARY BLOOD GLUCOSE          RADIOLOGY & ADDITIONAL TESTS: Reviewed. Denies any pain.  Feeling okay today.     OVERNIGHT EVENTS: NAEO      Remaining ROS negative       PHYSICAL EXAM:      General: no acute distress, resting in bed  HEENT: AT/NC, no facial asymmetry  Lungs: breathing is nonlabored  Heart: not tachycardic today, s1s2  Abdomen: soft, non-tender  Extremities: left leg dry gangrene, no gross focal deficit   Neuro: speech is fluent, no facial asymmetry, follows commands.     VITAL SIGNS:  Vital Signs Last 24 Hrs  T(C): 36.9 (08 Feb 2023 14:00), Max: 37.4 (08 Feb 2023 09:00)  T(F): 98.4 (08 Feb 2023 14:00), Max: 99.4 (08 Feb 2023 09:00)  HR: 124 (08 Feb 2023 15:37) (88 - 124)  BP: 112/62 (08 Feb 2023 15:37) (112/62 - 138/63)  BP(mean): 76 (08 Feb 2023 15:37) (76 - 91)  RR: 18 (08 Feb 2023 15:37) (15 - 18)  SpO2: 100% (08 Feb 2023 15:37) (96% - 100%)    Parameters below as of 08 Feb 2023 15:37  Patient On (Oxygen Delivery Method): room air          MEDICATIONS:  MEDICATIONS  (STANDING):  aMIOdarone    Tablet 100 milliGRAM(s) Oral every 24 hours  ascorbic acid 500 milliGRAM(s) Oral daily  chlorhexidine 2% Cloths 1 Application(s) Topical <User Schedule>  chlorhexidine 2% Cloths 1 Application(s) Topical <User Schedule>  cholestyramine Powder (Sugar-Free) 2 Gram(s) Oral three times a day  dextrose 5%. 1000 milliLiter(s) (50 mL/Hr) IV Continuous <Continuous>  dextrose 5%. 1000 milliLiter(s) (100 mL/Hr) IV Continuous <Continuous>  diphenoxylate/atropine 2 Tablet(s) Oral every 8 hours  enoxaparin Injectable 60 milliGRAM(s) SubCutaneous every 12 hours  fat emulsion (Fish Oil and Plant Based) 20% Infusion 0.7764 Gm/kG/Day (20.8 mL/Hr) IV Continuous <Continuous>  glucagon  Injectable 1 milliGRAM(s) IntraMuscular once  influenza  Vaccine (HIGH DOSE) 0.7 milliLiter(s) IntraMuscular once  insulin lispro (ADMELOG) corrective regimen sliding scale   SubCutaneous every 6 hours  loperamide 4 milliGRAM(s) Oral every 8 hours  metoprolol succinate ER 25 milliGRAM(s) Oral daily  mirtazapine 30 milliGRAM(s) Oral at bedtime  multivitamin 1 Tablet(s) Oral daily  opium Tincture 6 milliGRAM(s) Oral every 12 hours  pantoprazole    Tablet 40 milliGRAM(s) Oral two times a day  Parenteral Nutrition - Adult 1 Each (83 mL/Hr) TPN Continuous <Continuous>  Parenteral Nutrition - Adult 1 Each (83 mL/Hr) TPN Continuous <Continuous>  piperacillin/tazobactam IVPB.. 3.375 Gram(s) IV Intermittent every 8 hours  psyllium Powder 1 Packet(s) Oral every 8 hours  sacubitril 24 mG/valsartan 26 mG 1 Tablet(s) Oral two times a day  spironolactone 25 milliGRAM(s) Oral daily  zinc sulfate 220 milliGRAM(s) Oral daily    MEDICATIONS  (PRN):  acetaminophen     Tablet .. 650 milliGRAM(s) Oral every 6 hours PRN Temp greater or equal to 38C (100.4F), Mild Pain (1 - 3)  dextrose Oral Gel 15 Gram(s) Oral once PRN Blood Glucose LESS THAN 70 milliGRAM(s)/deciliter  melatonin 3 milliGRAM(s) Oral at bedtime PRN Insomnia  ondansetron Injectable 4 milliGRAM(s) IV Push every 6 hours PRN Nausea and/or Vomiting  oxyCODONE    IR 5 milliGRAM(s) Oral every 6 hours PRN Severe Pain (7 - 10)  sodium chloride 0.9% lock flush 10 milliLiter(s) IV Push every 1 hour PRN Pre/post blood products, medications, blood draw, and to maintain line patency      ALLERGIES:  Allergies    No Known Allergies    Intolerances        LABS:                        7.8    9.29  )-----------( 574      ( 08 Feb 2023 10:45 )             24.4     02-08    131<L>  |  96  |  17  ----------------------------<  145<H>  3.6   |  27  |  0.55    Ca    8.7      08 Feb 2023 10:45  Phos  2.6     02-08  Mg     1.9     02-08    TPro  6.6  /  Alb  2.1<L>  /  TBili  0.5  /  DBili  x   /  AST  17  /  ALT  26  /  AlkPhos  160<H>  02-08        CAPILLARY BLOOD GLUCOSE          RADIOLOGY & ADDITIONAL TESTS: Reviewed.

## 2023-02-08 NOTE — PROGRESS NOTE PEDS - NUTRITIONAL ASSESSMENT
This patient has been assessed with a concern for Malnutrition and has been determined to have a diagnosis/diagnoses of Severe protein-calorie malnutrition and Underweight (BMI < 19).    This patient is being managed with:   Parenteral Nutrition - Adult-  Entered: Feb 7 2023  5:00PM    fat emulsion (Fish Oil and Plant Based) 20% Infusion-[Known as SMOFLIPID 20% Infusion]  50 Gram(s) in IV Solution 250 milliLiter(s) infuse at 20.83 mL/Hr  Dose Rate: 0.7764 Gm/kG/Day Infuse Over: 12 Hours; Stop After 12 Hours  Administration Instructions: Use 1.2 micron in-line filter  Entered: Feb 7 2023  5:00PM    Diet Regular-    Start Time: 19:00  Supplement Feeding Modality:  Oral  Ensure Enlive Cans or Servings Per Day:  1       Frequency:  Three Times a day  Entered: Jan 20 2023  6:53AM    
This patient has been assessed with a concern for Malnutrition and has been determined to have a diagnosis/diagnoses of Severe protein-calorie malnutrition and Underweight (BMI < 19).    This patient is being managed with:   Parenteral Nutrition - Adult-  Entered: Jan 31 2023  5:00PM    fat emulsion (Fish Oil and Plant Based) 20% Infusion-[Known as SMOFLIPID 20% Infusion]  50 Gram(s) in IV Solution 250 milliLiter(s) infuse at 20.83 mL/Hr  Dose Rate: 0.7764 Gm/kG/Day Infuse Over: 12 Hours; Stop After 12 Hours  Administration Instructions: Use 1.2 micron in-line filter  Entered: Jan 31 2023  5:00PM    Diet Regular-    Start Time: 19:00  Supplement Feeding Modality:  Oral  Ensure Enlive Cans or Servings Per Day:  1       Frequency:  Three Times a day  Entered: Jan 20 2023  6:53AM

## 2023-02-08 NOTE — PROVIDER CONTACT NOTE (OTHER) - ASSESSMENT
Pt had a rectal BM w/ a colostomy, pt was tachy to 120, BP was within the parameters. Stool was brown and pasty, no evidence of blood in the stool.

## 2023-02-08 NOTE — PROGRESS NOTE PEDS - ASSESSMENT
77M with PMHx of IVDU found unresponsive at his nursing home, resolved after Narcan in the field and brought to [facility]. Found to have PE, atrial flutter, and large LV thrombus on echo. Transferred to [facility] for further management. Pt c/o abdominal pain on 12/10 CTA showing mid SMA with embolus. Abnormal distal small bowel loops and cecum with dilatation and pneumatosis suggesting infarcted bowel. One or two tiny foci of intrahepatic portal vein pneumatosis. Segmental infarction upper pole left kidney. Now s/p Ex lap, SMA embolectomy, 80cm SBR, abthera vac left in discontinuity (12/11) and transferred to SICU intubated. S/p second look (12/13) and most recently s/p OR for 3rd look, end-to-end anastomosis of remaining bowel, loop ileostomy and abdomen closure (12/15). LLE ischemia, AKA delayed by nutritional optimization.    Regular diet w/ ensures  PPN  f/u blood cxs   Pain/nausea control  A flutter now s/p cardioversion, amio 100 QD, toprol XL 25 QD  LV thrombus w LLE limb ischemia  Lovenox  OOBA/IS  AKA likely outpatient  AM labs  Dispo: [facility]AN pending auth and placement  
77M with PMHx of IVDU found unresponsive at his nursing home, resolved after Narcan in the field and brought to [facility]. Found to have PE, atrial flutter, and large LV thrombus on echo. Transferred to [facility] for further management. Pt c/o abdominal pain on 12/10 CTA showing mid SMA with embolus. Abnormal distal small bowel loops and cecum with dilatation and pneumatosis suggesting infarcted bowel. One or two tiny foci of intrahepatic portal vein pneumatosis. Segmental infarction upper pole left kidney. Now s/p Ex lap, SMA embolectomy, 80cm SBR, abthera vac left in discontinuity (12/11) and transferred to SICU intubated. S/p second look (12/13) and most recently s/p OR for 3rd look, end-to-end anastomosis of remaining bowel, loop ileostomy and abdomen closure (12/15). Now stepped down to telemtry. LLE ischemia, AKA delayed by nutritional optimization.    Regular diet w/ ensures  PPN  Zosyn  Pain/nausea control  A flutter now s/p cardioversion, amio 100 QD, toprol XL 25 QD  LV thrombus w LLE limb ischemia  Lovenox  OOBA/IS  AKA likely outpatient  AM labs  Dispo: optimize nutrition status, possible OR next week for ileostomy reversal

## 2023-02-08 NOTE — PROGRESS NOTE ADULT - ASSESSMENT
77M first presented to [facility] from [facility] due to unresponsiveness (responded to Narcan). At [facility], found to have subsegmental pulmonary embolism in RUL, rapid atrial flutter with severely reduced LVEF with LV thrombus. Transferred to [facility] on 12/7/22 for LORENZO and possible ablation. At [facility], was found to have left leg ischemia (left leg arterial thrombus on LE duplex on 12/8). Was being considered for rhythm control when he developed abdominal pain, CTA (12/10/22) showed embolus in SMA, bowel infarct, requiring ex-lap on 12/11 with SMA embolectomy, 80cm small bowel resection; On 12/13, underwent 2nd look laparotomy, with 80cm small bowel resection, closure with abthera. On 12/15, underwent 3rd look laparotomy, end-to-end anastomosis of remaining bowel, loop ileostomy and abdominal closure. Now extubated, on tele floor. Eventually underwent LORENZO/DCCV on 12/30/22, now in sinus rhythm. Currently being evaluated for possible above knee amputation for LLE ischemia (possibly deferring till next hospitalization, after optimization of nutritional status).      #Fever  #Leukocytosis   -Pt with CT abd/pelvis which showed fluid collection. Pt was seen by IR re: fluid collection however the pt's imaging was reviewed by IR and it was determined that the fluid collection is not amenable to percutaneous drainage d/t bowel obstructing a safe window. May need to go to OR for washout of the infected area.  S/p GOC discussion with palliative care, and planning for return to OR for further surgery  - Continue to monitor WBC, patient has been refusing labs intermittently.   - continue treatment with zosyn  - possible OR next week for ostomy reversal.     #Hypotension  Difficult to assess etiology in view of comorbidities and lack of labs over the last few days. Primary team attempting to obtain labs, however patient has been refusing.   Improving BP, optimize patient volume status.     #Hyponatremia   - continue to monitor - likely in the setting of poor nutritional status, hypovolumic hyponatremia    # Left leg ischemia   - plan per vascular and primary team    #Suicidal Ideation  #Insomnia   - continue with 1:1  - appreciate input from behavioral health  - Continuing mirtazapine to 30mg QHS for ongoing depressive sx and insomnia    # Acute Systolic Heart Failure   - Metoprolol succinate 25mg qd - continue    # Atrial Flutter  - s/p DCCV 12/30  - EP recs - uninterrupted AC x 30 days ; January 29th was 30 days post DCCV.   -Continue Lovenox, amiodarone and metoprolol succinate    # Pulmonary embolism (subsegmental RUL)   - After completion of AC for DCCV, would need to continue AC for PE - continuing lovenox at this time  - intermittently tachycardic    # Bowel ischemia - s/p SMA embolectomy; Small bowel resection  - ileostomy in place - planning for likely reversal next week as long as nutritional status is optimized  - plan of primary team  - loperamide and diphenoxylate atropine    # Severe protein-calorie malnutrition  # Sacral wound   - needs nutritional optimization  - Wound care per nursing protocol   - once not febrile, will need PICC line placement for TPN    #DVT ppx -  lovenox for PE   Discussed with primary team

## 2023-02-09 NOTE — BH CONSULTATION LIAISON PROGRESS NOTE - NSBHATTESTCOMMENTATTENDFT_PSY_A_CORE
Patient is a 78 year old male with history of substance use and no significant past medical history (poor medical follow up, last PCP visit 20 years prior to admission), presenting with unresponsiveness from nursing home to [facility], found to be in Aflutter w RVR with subsegmental PE RUL, transferred to [facility], found to have LV thrombus and worsening HF, EF 10-15%. 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 Course further complicated by LLE pulselessness and ulcerations that will ultimately require BKA. Psychiatry consulted due to chronic depressed mood in the hospital and suicidality. Per staff pt verbalizes Si without intent, when he experiences high physical distress (pain). Upon re-assessment today patient presents with similarly low mood and hopelessness about his medical conditions, slightly more fatigued and with some confusion (disoriented to the day of the week). He continues to endorse feeling motivated to engage in treatment and work on improving his physical and nutritional state and denies active SI. Patient's past statements of wanting to die appear to be triggered by moments of high distress due to pain and frustration with lack of improvement in his physical state and not actual suicidal statements.    Plan:  -patient does not require 1:1 observation for acute suicidality; consider nursing enhanced supervision   -patient to be followed daily by the Psychology team for supportive psychotherapy    -continue mirtazapine 30mg QHS for ongoing depressive sx and insomnia, will observe for response  -Encourage sleep hygiene and limiting sleep during the day    -Delirium precautions  -Psychology to follow the pt daily for supportive psychotherapy

## 2023-02-09 NOTE — PROGRESS NOTE ADULT - SUBJECTIVE AND OBJECTIVE BOX
SUBJECTIVE:  Patient seen and examined on AM rounds with chief resident. Febrile overnight (100.5). This morning, he is doing ok. He denies abdominal pain, nausea, and vomiting. Tolerating regular diet. Ostomy working.     MEDICATIONS  (STANDING):  aMIOdarone    Tablet 100 milliGRAM(s) Oral every 24 hours  ascorbic acid 500 milliGRAM(s) Oral daily  chlorhexidine 2% Cloths 1 Application(s) Topical <User Schedule>  chlorhexidine 2% Cloths 1 Application(s) Topical <User Schedule>  cholestyramine Powder (Sugar-Free) 2 Gram(s) Oral three times a day  dextrose 5%. 1000 milliLiter(s) (50 mL/Hr) IV Continuous <Continuous>  dextrose 5%. 1000 milliLiter(s) (100 mL/Hr) IV Continuous <Continuous>  diphenoxylate/atropine 2 Tablet(s) Oral every 8 hours  enoxaparin Injectable 60 milliGRAM(s) SubCutaneous every 12 hours  fat emulsion (Fish Oil and Plant Based) 20% Infusion 0.7764 Gm/kG/Day (20.8 mL/Hr) IV Continuous <Continuous>  glucagon  Injectable 1 milliGRAM(s) IntraMuscular once  influenza  Vaccine (HIGH DOSE) 0.7 milliLiter(s) IntraMuscular once  insulin lispro (ADMELOG) corrective regimen sliding scale   SubCutaneous every 6 hours  loperamide 4 milliGRAM(s) Oral every 8 hours  metoprolol succinate ER 25 milliGRAM(s) Oral daily  mirtazapine 30 milliGRAM(s) Oral at bedtime  multivitamin 1 Tablet(s) Oral daily  opium Tincture 6 milliGRAM(s) Oral every 12 hours  pantoprazole    Tablet 40 milliGRAM(s) Oral two times a day  Parenteral Nutrition - Adult 1 Each (83 mL/Hr) TPN Continuous <Continuous>  Parenteral Nutrition - Adult 1 Each (83 mL/Hr) TPN Continuous <Continuous>  piperacillin/tazobactam IVPB.. 3.375 Gram(s) IV Intermittent every 8 hours  psyllium Powder 1 Packet(s) Oral every 8 hours  sacubitril 24 mG/valsartan 26 mG 1 Tablet(s) Oral two times a day  spironolactone 25 milliGRAM(s) Oral daily  zinc sulfate 220 milliGRAM(s) Oral daily    MEDICATIONS  (PRN):  acetaminophen     Tablet .. 650 milliGRAM(s) Oral every 6 hours PRN Temp greater or equal to 38C (100.4F), Mild Pain (1 - 3)  dextrose Oral Gel 15 Gram(s) Oral once PRN Blood Glucose LESS THAN 70 milliGRAM(s)/deciliter  melatonin 3 milliGRAM(s) Oral at bedtime PRN Insomnia  ondansetron Injectable 4 milliGRAM(s) IV Push every 6 hours PRN Nausea and/or Vomiting  oxyCODONE    IR 5 milliGRAM(s) Oral every 6 hours PRN Severe Pain (7 - 10)  sodium chloride 0.9% lock flush 10 milliLiter(s) IV Push every 1 hour PRN Pre/post blood products, medications, blood draw, and to maintain line patency      Vital Signs Last 24 Hrs  T(C): 36.8 (09 Feb 2023 14:04), Max: 38.1 (08 Feb 2023 22:06)  T(F): 98.2 (09 Feb 2023 14:04), Max: 100.5 (08 Feb 2023 22:06)  HR: 102 (09 Feb 2023 13:00) (96 - 120)  BP: 98/62 (09 Feb 2023 13:00) (98/62 - 122/56)  BP(mean): 75 (09 Feb 2023 13:00) (75 - 85)  RR: 18 (09 Feb 2023 13:00) (18 - 18)  SpO2: 100% (09 Feb 2023 13:00) (94% - 100%)    Parameters below as of 09 Feb 2023 13:00  Patient On (Oxygen Delivery Method): room air    Physical Exam:  General: NAD, resting comfortably in bed  Pulmonary: Nonlabored breathing, no respiratory distress  Cardiovascular: NSR  Abdominal: soft, NT/ND, ostomy functioning with output   Extremities: WWP, dry gangrene to below knee LLE    I&O's Summary    08 Feb 2023 07:01  -  09 Feb 2023 07:00  --------------------------------------------------------  IN: 2981 mL / OUT: 3350 mL / NET: -369 mL    09 Feb 2023 07:01  -  09 Feb 2023 17:15  --------------------------------------------------------  IN: 1132.4 mL / OUT: 2050 mL / NET: -917.6 mL        LABS:                        8.1    14.25 )-----------( 594      ( 09 Feb 2023 10:27 )             24.9     02-09    132<L>  |  98  |  19  ----------------------------<  125<H>  3.8   |  25  |  0.61    Ca    9.0      09 Feb 2023 10:27  Phos  2.8     02-09  Mg     1.8     02-09    TPro  7.3  /  Alb  2.1<L>  /  TBili  0.5  /  DBili  x   /  AST  16  /  ALT  25  /  AlkPhos  167<H>  02-09    PT/INR - ( 09 Feb 2023 10:27 )   PT: 14.9 sec;   INR: 1.25          PTT - ( 09 Feb 2023 10:27 )  PTT:32.1 sec    CAPILLARY BLOOD GLUCOSE      POCT Blood Glucose.: 150 mg/dL (09 Feb 2023 16:14)    LIVER FUNCTIONS - ( 09 Feb 2023 10:27 )  Alb: 2.1 g/dL / Pro: 7.3 g/dL / ALK PHOS: 167 U/L / ALT: 25 U/L / AST: 16 U/L / GGT: x

## 2023-02-09 NOTE — PROGRESS NOTE ADULT - ASSESSMENT
77M with PMHx of IVDU found unresponsive at his nursing home, resolved after Narcan in the field and brought to [facility]. Found to have PE, atrial flutter, and large LV thrombus on echo. Transferred to [facility] for further management. Pt c/o abdominal pain on 12/10 CTA showing mid SMA with embolus. Abnormal distal small bowel loops and cecum with dilatation and pneumatosis suggesting infarcted bowel. One or two tiny foci of intrahepatic portal vein pneumatosis. Segmental infarction upper pole left kidney. Now s/p Ex lap, SMA embolectomy, 80cm SBR, abthera vac left in discontinuity (12/11) and transferred to SICU intubated. S/p second look (12/13) and most recently s/p OR for 3rd look, end-to-end anastomosis of remaining bowel, loop ileostomy and abdomen closure (12/15). Now stepped down to telemtry. LLE ischemia, AKA delayed by nutritional optimization.    Regular diet w/ ensures  PPN, picc placement tomorrow  Zosyn  Pain/nausea control  A flutter now s/p cardioversion, amio 100 QD, toprol XL 25 QD  LV thrombus w LLE limb ischemia  Lovenox  OOBA/IS  AKA likely outpatient  AM labs  Dispo: optimize nutrition status, possible OR next week for ileostomy reversal

## 2023-02-09 NOTE — BH CONSULTATION LIAISON PROGRESS NOTE - NSBHASSESSMENTFT_PSY_ALL_CORE
Patient is a 78 year old male with history of substance use and no significant past medical history (poor medical follow up, last PCP visit 20 years prior to admission), presenting with unresponsiveness from nursing home to [facility], found to be in Aflutter w RVR with subsegmental PE RUL, transferred to [facility], found to have LV thrombus and worsening HF, EF 10-15%. 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 Course further complicated by LLE pulselessness and ulcerations that will ultimately require BKA. Psychiatry consulted due to chronic depressed mood in the hospital and suicidality. Per staff pt verbalizes Si without intent, when he experiences high physical distress (pain). Upon re-assessment today patient presents with similarly low mood and hopelessness about his medical conditions, slightly more fatigued and with some confusion (disoriented to the day of the week). He continues to endorse feeling motivated to engage in treatment and work on improving his physical and nutritional state and denies active SI. Patient's past statements of wanting to die appear to be triggered by moments of high distress due to pain and frustration with lack of improvement in his physical state and not actual suicidal statements.    Plan:  -patient does not require 1:1 observation for acute suicidality; consider nursing enhanced supervision   -patient to be followed daily by the Psychology team for supportive psychotherapy    -continue mirtazapine 30mg QHS for ongoing depressive sx and insomnia, will observe for response  -Encourage sleep hygiene and limiting sleep during the day    -Delirium precautions  -Psychology to follow the pt daily for supportive psychotherapy

## 2023-02-09 NOTE — BH CONSULTATION LIAISON PROGRESS NOTE - NSBHFUPINTERVALHXFT_PSY_A_CORE
Pt is a 78-year-old Black, cisgender male with prolonged hospitalization, mesenteric ischemia s/p SBR 12/2022, c/b intra-abdominal hematoma, LE gangrene pending possible AKA (pending nutritional optimization, currently experiencing malnutrition/malabsorption).    C/L psychology intern met with pt for 60-minute individual psychotherapy session. Upon approach, pt was awake, lying in bed. Pt was AAOx4; for the first time witnessed by writer, pt was able to communicate what the course of his hospitalizations have been (e.g., he was aware he was initially at [facility], that he had abdominal surgery, etc.). Session focused on pt's choices for treatment, namely to endure blood draws (which he has recently been refusing) and other discomforts for a chance at recovery. 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 Pt was open and willing to consider these coping skills and acknowledged his desire to cooperate with medical treatment/interventions but feeling ambivalent due to the physical pain and discomfort associated with such medical interventions. Writer provided empathic, active, reflective listening, as well as validation of pt's internal conflict around wanting to live but feeling he is unable to take the necessary steps toward recovery. At the end of the session, pt openly expressed gratitude toward writer for taking the time to speak with him. Pt denied SI/HI/SIB/AH/VH/PI.

## 2023-02-09 NOTE — PROGRESS NOTE ADULT - SUBJECTIVE AND OBJECTIVE BOX
Denies any pain.  Wound care RN is checking on ostomy.  No new symptoms.  Awaiting PICC line placement tomorrow.     ***Note in progress***    OVERNIGHT EVENTS: NAEO    SUBJECTIVE / INTERVAL HPI: Patient seen and examined at bedside. Patient denying chest pain, SOB, palpitations, cough. Patient denies fever, chills, HA, Dizziness, change in vision/hearing, N/V, abdominal pain, diarrhea, constipation, hematochezia/melena, dysuria, hematuria, new onset weakness/numbness, LE pain and/or swelling.    Remaining ROS negative       PHYSICAL EXAM:    General: WDWN  HEENT: NC/AT; PERRL, anicteric sclera; MMM  Neck: supple  Cardiovascular: +S1/S2, RRR  Respiratory: CTA B/L; no W/R/R  Gastrointestinal: soft, NT/ND; +BSx4  Extremities: WWP; no edema, clubbing or cyanosis  Vascular: 2+ radial, DP/PT pulses B/L  Neurological: AAOx3; no focal deficits  Psychiatric: pleasant mood and affect  Dermatologic: no appreciable wounds or damage to the skin    VITAL SIGNS:  Vital Signs Last 24 Hrs  T(C): 36.7 (09 Feb 2023 09:21), Max: 38.1 (08 Feb 2023 22:06)  T(F): 98 (09 Feb 2023 09:21), Max: 100.5 (08 Feb 2023 22:06)  HR: 96 (09 Feb 2023 09:58) (96 - 124)  BP: 100/72 (09 Feb 2023 09:58) (100/72 - 122/56)  BP(mean): 81 (09 Feb 2023 09:58) (76 - 85)  RR: 18 (09 Feb 2023 08:40) (17 - 18)  SpO2: 97% (09 Feb 2023 09:58) (94% - 100%)    Parameters below as of 09 Feb 2023 09:58  Patient On (Oxygen Delivery Method): room air          MEDICATIONS:  MEDICATIONS  (STANDING):  aMIOdarone    Tablet 100 milliGRAM(s) Oral every 24 hours  ascorbic acid 500 milliGRAM(s) Oral daily  chlorhexidine 2% Cloths 1 Application(s) Topical <User Schedule>  chlorhexidine 2% Cloths 1 Application(s) Topical <User Schedule>  cholestyramine Powder (Sugar-Free) 2 Gram(s) Oral three times a day  dextrose 5%. 1000 milliLiter(s) (50 mL/Hr) IV Continuous <Continuous>  dextrose 5%. 1000 milliLiter(s) (100 mL/Hr) IV Continuous <Continuous>  diphenoxylate/atropine 2 Tablet(s) Oral every 8 hours  enoxaparin Injectable 60 milliGRAM(s) SubCutaneous every 12 hours  fat emulsion (Fish Oil and Plant Based) 20% Infusion 0.7764 Gm/kG/Day (20.8 mL/Hr) IV Continuous <Continuous>  glucagon  Injectable 1 milliGRAM(s) IntraMuscular once  influenza  Vaccine (HIGH DOSE) 0.7 milliLiter(s) IntraMuscular once  insulin lispro (ADMELOG) corrective regimen sliding scale   SubCutaneous every 6 hours  loperamide 4 milliGRAM(s) Oral every 8 hours  metoprolol succinate ER 25 milliGRAM(s) Oral daily  mirtazapine 30 milliGRAM(s) Oral at bedtime  multivitamin 1 Tablet(s) Oral daily  opium Tincture 6 milliGRAM(s) Oral every 12 hours  pantoprazole    Tablet 40 milliGRAM(s) Oral two times a day  Parenteral Nutrition - Adult 1 Each (83 mL/Hr) TPN Continuous <Continuous>  Parenteral Nutrition - Adult 1 Each (83 mL/Hr) TPN Continuous <Continuous>  piperacillin/tazobactam IVPB.. 3.375 Gram(s) IV Intermittent every 8 hours  psyllium Powder 1 Packet(s) Oral every 8 hours  sacubitril 24 mG/valsartan 26 mG 1 Tablet(s) Oral two times a day  spironolactone 25 milliGRAM(s) Oral daily  zinc sulfate 220 milliGRAM(s) Oral daily    MEDICATIONS  (PRN):  acetaminophen     Tablet .. 650 milliGRAM(s) Oral every 6 hours PRN Temp greater or equal to 38C (100.4F), Mild Pain (1 - 3)  dextrose Oral Gel 15 Gram(s) Oral once PRN Blood Glucose LESS THAN 70 milliGRAM(s)/deciliter  melatonin 3 milliGRAM(s) Oral at bedtime PRN Insomnia  ondansetron Injectable 4 milliGRAM(s) IV Push every 6 hours PRN Nausea and/or Vomiting  oxyCODONE    IR 5 milliGRAM(s) Oral every 6 hours PRN Severe Pain (7 - 10)  sodium chloride 0.9% lock flush 10 milliLiter(s) IV Push every 1 hour PRN Pre/post blood products, medications, blood draw, and to maintain line patency      ALLERGIES:  Allergies    No Known Allergies    Intolerances        LABS:                        8.1    14.25 )-----------( 594      ( 09 Feb 2023 10:27 )             24.9     02-09    132<L>  |  98  |  19  ----------------------------<  125<H>  3.8   |  25  |  0.61    Ca    9.0      09 Feb 2023 10:27  Phos  2.8     02-09  Mg     1.8     02-09    TPro  7.3  /  Alb  2.1<L>  /  TBili  0.5  /  DBili  x   /  AST  16  /  ALT  25  /  AlkPhos  167<H>  02-09    PT/INR - ( 09 Feb 2023 10:27 )   PT: 14.9 sec;   INR: 1.25          PTT - ( 09 Feb 2023 10:27 )  PTT:32.1 sec    CAPILLARY BLOOD GLUCOSE          RADIOLOGY & ADDITIONAL TESTS: Reviewed. Denies any pain.  Wound care RN is checking on ostomy.  No new symptoms.  Awaiting PICC line placement tomorrow.     OVERNIGHT EVENTS: NAEO    SUBJECTIVE / INTERVAL HPI: Patient seen and examined at bedside. Patient denying chest pain, SOB, palpitations, cough. Patient denies fever, chills, HA, Dizziness, change in vision/hearing, N/V, abdominal pain, diarrhea, constipation, hematochezia/melena, dysuria, hematuria, new onset weakness/numbness, LE pain and/or swelling.    Remaining ROS negative       PHYSICAL EXAM:  General: no acute distress, resting in bed  HEENT: AT/NC, no facial asymmetry  Lungs: breathing is nonlabored  Heart: not tachycardic today, s1s2  Abdomen: soft, non-tender  Extremities: left leg dry gangrene, no gross focal deficit   Neuro: speech is fluent, no facial asymmetry, follows commands.     VITAL SIGNS:  Vital Signs Last 24 Hrs  T(C): 36.7 (09 Feb 2023 09:21), Max: 38.1 (08 Feb 2023 22:06)  T(F): 98 (09 Feb 2023 09:21), Max: 100.5 (08 Feb 2023 22:06)  HR: 96 (09 Feb 2023 09:58) (96 - 124)  BP: 100/72 (09 Feb 2023 09:58) (100/72 - 122/56)  BP(mean): 81 (09 Feb 2023 09:58) (76 - 85)  RR: 18 (09 Feb 2023 08:40) (17 - 18)  SpO2: 97% (09 Feb 2023 09:58) (94% - 100%)    Parameters below as of 09 Feb 2023 09:58  Patient On (Oxygen Delivery Method): room air          MEDICATIONS:  MEDICATIONS  (STANDING):  aMIOdarone    Tablet 100 milliGRAM(s) Oral every 24 hours  ascorbic acid 500 milliGRAM(s) Oral daily  chlorhexidine 2% Cloths 1 Application(s) Topical <User Schedule>  chlorhexidine 2% Cloths 1 Application(s) Topical <User Schedule>  cholestyramine Powder (Sugar-Free) 2 Gram(s) Oral three times a day  dextrose 5%. 1000 milliLiter(s) (50 mL/Hr) IV Continuous <Continuous>  dextrose 5%. 1000 milliLiter(s) (100 mL/Hr) IV Continuous <Continuous>  diphenoxylate/atropine 2 Tablet(s) Oral every 8 hours  enoxaparin Injectable 60 milliGRAM(s) SubCutaneous every 12 hours  fat emulsion (Fish Oil and Plant Based) 20% Infusion 0.7764 Gm/kG/Day (20.8 mL/Hr) IV Continuous <Continuous>  glucagon  Injectable 1 milliGRAM(s) IntraMuscular once  influenza  Vaccine (HIGH DOSE) 0.7 milliLiter(s) IntraMuscular once  insulin lispro (ADMELOG) corrective regimen sliding scale   SubCutaneous every 6 hours  loperamide 4 milliGRAM(s) Oral every 8 hours  metoprolol succinate ER 25 milliGRAM(s) Oral daily  mirtazapine 30 milliGRAM(s) Oral at bedtime  multivitamin 1 Tablet(s) Oral daily  opium Tincture 6 milliGRAM(s) Oral every 12 hours  pantoprazole    Tablet 40 milliGRAM(s) Oral two times a day  Parenteral Nutrition - Adult 1 Each (83 mL/Hr) TPN Continuous <Continuous>  Parenteral Nutrition - Adult 1 Each (83 mL/Hr) TPN Continuous <Continuous>  piperacillin/tazobactam IVPB.. 3.375 Gram(s) IV Intermittent every 8 hours  psyllium Powder 1 Packet(s) Oral every 8 hours  sacubitril 24 mG/valsartan 26 mG 1 Tablet(s) Oral two times a day  spironolactone 25 milliGRAM(s) Oral daily  zinc sulfate 220 milliGRAM(s) Oral daily    MEDICATIONS  (PRN):  acetaminophen     Tablet .. 650 milliGRAM(s) Oral every 6 hours PRN Temp greater or equal to 38C (100.4F), Mild Pain (1 - 3)  dextrose Oral Gel 15 Gram(s) Oral once PRN Blood Glucose LESS THAN 70 milliGRAM(s)/deciliter  melatonin 3 milliGRAM(s) Oral at bedtime PRN Insomnia  ondansetron Injectable 4 milliGRAM(s) IV Push every 6 hours PRN Nausea and/or Vomiting  oxyCODONE    IR 5 milliGRAM(s) Oral every 6 hours PRN Severe Pain (7 - 10)  sodium chloride 0.9% lock flush 10 milliLiter(s) IV Push every 1 hour PRN Pre/post blood products, medications, blood draw, and to maintain line patency      ALLERGIES:  Allergies    No Known Allergies    Intolerances        LABS:                        8.1    14.25 )-----------( 594      ( 09 Feb 2023 10:27 )             24.9     02-09    132<L>  |  98  |  19  ----------------------------<  125<H>  3.8   |  25  |  0.61    Ca    9.0      09 Feb 2023 10:27  Phos  2.8     02-09  Mg     1.8     02-09    TPro  7.3  /  Alb  2.1<L>  /  TBili  0.5  /  DBili  x   /  AST  16  /  ALT  25  /  AlkPhos  167<H>  02-09    PT/INR - ( 09 Feb 2023 10:27 )   PT: 14.9 sec;   INR: 1.25          PTT - ( 09 Feb 2023 10:27 )  PTT:32.1 sec    CAPILLARY BLOOD GLUCOSE          RADIOLOGY & ADDITIONAL TESTS: Reviewed.

## 2023-02-09 NOTE — PROGRESS NOTE ADULT - ASSESSMENT
77M first presented to [facility] from [facility] due to unresponsiveness (responded to Narcan). At [facility], found to have subsegmental pulmonary embolism in RUL, rapid atrial flutter with severely reduced LVEF with LV thrombus. Transferred to [facility] on 12/7/22 for LORENZO and possible ablation. At [facility], was found to have left leg ischemia (left leg arterial thrombus on LE duplex on 12/8). Was being considered for rhythm control when he developed abdominal pain, CTA (12/10/22) showed embolus in SMA, bowel infarct, requiring ex-lap on 12/11 with SMA embolectomy, 80cm small bowel resection; On 12/13, underwent 2nd look laparotomy, with 80cm small bowel resection, closure with abthera. On 12/15, underwent 3rd look laparotomy, end-to-end anastomosis of remaining bowel, loop ileostomy and abdominal closure. Now extubated, on tele floor. Eventually underwent LORENZO/DCCV on 12/30/22, now in sinus rhythm. Currently being evaluated for possible above knee amputation for LLE ischemia (possibly deferring till next hospitalization, after optimization of nutritional status).      #Fever  #Leukocytosis   -Pt with CT abd/pelvis which showed fluid collection. Pt was seen by IR re: fluid collection however the pt's imaging was reviewed by IR and it was determined that the fluid collection is not amenable to percutaneous drainage d/t bowel obstructing a safe window.  S/p GOC discussion with palliative care, and planning for return to OR for further surgery  - Continue to monitor WBC, patient has been refusing labs   - Appreciate ID. On Pip/Tazo currently  - planning for OR once nutrition is optimized  - awaiting another 24 hours of being afebrile.  Fevers are likely from fluid collection that is not amenable to drainage.      #Hypotension  Difficult to assess etiology in view of comorbidities and lack of labs over the last few days. Primary team attempting to obtain labs, however patient has been refusing.   Improving BP, optimize patient volume status.     #Hyponatremia   - improved today - 136    # Left leg ischemia   - plan per vascular and primary team    #Suicidal Ideation  #Insomnia   - Pt was placed back on 1:1 observation for suicidality.   - Continuing mirtazapine to 30mg QHS for ongoing depressive sx and insomnia    # Acute Systolic Heart Failure   - Metoprolol succinate 25mg qd - continue    # Atrial Flutter  - s/p DCCV 12/30  - EP recs - uninterrupted AC x 30 days ; January 29th was 30 days post DCCV.   -Continue Lovenox, amiodarone and metoprolol succinate    # Pulmonary embolism (subsegmental RUL)   - After completion of AC for DCCV, would need to continue AC for PE - continuing lovenox at this time  - still minimally tachycardic, but on exam this morning patient was no longer tachycardia - HR in the 80s - potentially presence of PE contributes to tachycardia. Otherwise hemodynamics are stable.     # Bowel ischemia - s/p SMA embolectomy; Small bowel resection  - ileostomy in place  - plan of primary team  - loperamide and diphenoxylate atropine    # Severe protein-calorie malnutrition  # Sacral wound   - needs nutritional optimization  - Wound care per nursing protocol     #DVT ppx -  lovenox for PE   Discussed with primary team    77M first presented to [facility] from [facility] due to unresponsiveness (responded to Narcan). At [facility], found to have subsegmental pulmonary embolism in RUL, rapid atrial flutter with severely reduced LVEF with LV thrombus. Transferred to [facility] on 12/7/22 for LORENZO and possible ablation. At [facility], was found to have left leg ischemia (left leg arterial thrombus on LE duplex on 12/8). Was being considered for rhythm control when he developed abdominal pain, CTA (12/10/22) showed embolus in SMA, bowel infarct, requiring ex-lap on 12/11 with SMA embolectomy, 80cm small bowel resection; On 12/13, underwent 2nd look laparotomy, with 80cm small bowel resection, closure with abthera. On 12/15, underwent 3rd look laparotomy, end-to-end anastomosis of remaining bowel, loop ileostomy and abdominal closure. Now extubated, on tele floor. Eventually underwent LORENZO/DCCV on 12/30/22, now in sinus rhythm. Currently being evaluated for possible above knee amputation for LLE ischemia (possibly deferring till next hospitalization, after optimization of nutritional status).      #Fever  #Leukocytosis   -Pt with CT abd/pelvis which showed fluid collection. Pt was seen by IR re: fluid collection however the pt's imaging was reviewed by IR and it was determined that the fluid collection is not amenable to percutaneous drainage d/t bowel obstructing a safe window.  S/p GOC discussion with palliative care, and planning for return to OR for further surgery  - Continue to monitor WBC, patient has been refusing labs intermittently  - Appreciate ID. On Pip/Tazo currently  - planning for OR once nutrition is optimized  - fevers are being attributed to fluid collection that was deemed to not be amenable for percutaneous drainage. Decision about how to proceed with tackling the fluid collection per primary team.   - planning for PICC line placement tomorrow    #Hypotension  Difficult to assess etiology in view of comorbidities and lack of labs over the last few days. Primary team attempting to obtain labs, however patient has been refusing.   Improving BP, optimize patient volume status.     #Hyponatremia   - improved today - hypovolumic hyponatremia  - continue to monitor with daily labs  - anticipate that it will improve once patient is started on TPN.     # Left leg ischemia   - plan per vascular and primary team    #Suicidal Ideation  #Insomnia   - no longer on 1:!  - psychiatry is following, continue to appreciate input  - Continuing mirtazapine to 30mg QHS for ongoing depressive sx and insomnia    # Acute Systolic Heart Failure   - Metoprolol succinate 25mg qd - continue    # Atrial Flutter  - s/p DCCV 12/30  - EP recs - uninterrupted AC x 30 days ; January 29th was 30 days post DCCV.   -Continue Lovenox, amiodarone and metoprolol succinate    # Pulmonary embolism (subsegmental RUL)   - After completion of AC for DCCV, would need to continue AC for PE - continuing lovenox at this time  - still minimally tachycardic, likely from infection and presence of PE.      # Bowel ischemia - s/p SMA embolectomy; Small bowel resection  - ileostomy in place  - plan of primary team  - loperamide and diphenoxylate atropine    # Severe protein-calorie malnutrition  # Sacral wound   - needs nutritional optimization  - Wound care per nursing protocol     #DVT ppx -  lovenox for PE   Discussed with primary team   >45 minutes spent on this encounter, including face to face with patient, care coordination and documentation.

## 2023-02-10 NOTE — BH CONSULTATION LIAISON PROGRESS NOTE - NSBHASSESSMENTFT_PSY_ALL_CORE
Patient is a 78 year old male with history of substance use and no significant past medical history (poor medical follow up, last PCP visit 20 years prior to admission), presenting with unresponsiveness from nursing home to [facility], found to be in Aflutter w RVR with subsegmental PE RUL, transferred to [facility], found to have LV thrombus and worsening HF, EF 10-15%. 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 Course further complicated by LLE pulselessness and ulcerations that will ultimately require BKA. Psychiatry consulted due to chronic depressed mood in the hospital and suicidality. Per staff pt verbalizes Si without intent, when he experiences high physical distress (pain). Upon re-assessment today patient presents with depressed mood and potentially some confusion, though grossly intact cognitively. Was able to meaningfully engage in individual psychotherapy, though quickly transitions from calm to emotional and or physical distress at times. He reported having existential thoughts of death, though did not report any suicidal ideation, stating that he wants to get better physically. Patient's past statements of wanting to die appear to be triggered by moments of high distress due to pain and frustration with lack of improvement in his physical state and not actual suicidal statements. No symptoms of psychosis, aggression, homicidality, or tyler noted or reported.     :::Recommendations:::  -patient does not require 1:1 observation for acute suicidality; consider nursing enhanced supervision   -patient to be followed daily by the Psychology team for supportive psychotherapy  -Encourage sleep hygiene and limiting sleep during the day  -Delirium precautions  -Continue to monitor for potential confusion     Patient is a 78 year old male with history of substance use and no significant past medical history (poor medical follow up, last PCP visit 20 years prior to admission), presenting with unresponsiveness from nursing home to [facility], found to be in Aflutter w RVR with subsegmental PE RUL, transferred to [facility], found to have LV thrombus and worsening HF, EF 10-15%. 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 Course further complicated by LLE pulselessness and ulcerations that will ultimately require BKA. Psychiatry consulted due to chronic depressed mood in the hospital and suicidality. Per staff pt verbalizes Si without intent, when he experiences high physical distress (pain). Upon re-assessment today patient presents with depressed mood and potentially some confusion, though grossly intact cognitively. Was able to meaningfully engage in individual psychotherapy, though quickly transitions from calm to emotional and or physical distress at times. He reported having existential thoughts of death, though did not report any suicidal ideation, stating that he wants to get better physically. Patient's past statements of wanting to die appear to be triggered by moments of high distress due to pain and frustration with lack of improvement in his physical state and not actual suicidal statements. No symptoms of psychosis, aggression, homicidality, or tyler noted or reported.     :::Recommendations:::  -patient does not require 1:1 observation for acute suicidality; consider nursing enhanced supervision   -patient to be followed daily by the Psychology team for supportive psychotherapy  -Encourage sleep hygiene and limiting sleep during the day  -Delirium precautions  -Continue to monitor for potential confusion - potentially 2/2 delirium today

## 2023-02-10 NOTE — PROVIDER CONTACT NOTE (OTHER) - ASSESSMENT
Pt a&ox4, responsive, /85, became tachycardic to the 140;s, Low grade fever and new onset shivering.

## 2023-02-10 NOTE — CONSULT NOTE ADULT - SUBJECTIVE AND OBJECTIVE BOX
HPI:  Patient is a 76 y/o male with no significant past medical history BIBA from [facility] due to unresponsiveness. Patient was reportedly found unresponsive at his nursing home, Narcan was given in the field with resolution, and patient was taken to [facility]. Pt was persistently tachycardic in 130-140s despite receiving Adenosine 6mg IV and then 12mg IV, Lopressor 2.5mg x2, PO Metoprolol tartrate 100mg. Utox was positive for opioids. CTPA suggestive of subsegmental PE in the right upper lobe. Echo showed severely reduced LV function with an LV thrombus. Heparin gtt was started. Cardiology and EP were consulted due to atrial flutter. EP suggested transfer to [facility] for LORENZO and possible ablation.  (07 Dec 2022 12:26)      PAST MEDICAL & SURGICAL HISTORY:      ROS: See HPI    MEDICATIONS  (STANDING):  aMIOdarone    Tablet 100 milliGRAM(s) Oral every 24 hours  ascorbic acid 500 milliGRAM(s) Oral daily  chlorhexidine 2% Cloths 1 Application(s) Topical <User Schedule>  cholestyramine Powder (Sugar-Free) 2 Gram(s) Oral three times a day  dextrose 5%. 1000 milliLiter(s) (50 mL/Hr) IV Continuous <Continuous>  dextrose 5%. 1000 milliLiter(s) (100 mL/Hr) IV Continuous <Continuous>  dextrose 5%. 1000 milliLiter(s) (50 mL/Hr) IV Continuous <Continuous>  dextrose 5%. 1000 milliLiter(s) (100 mL/Hr) IV Continuous <Continuous>  dextrose 50% Injectable 25 Gram(s) IV Push once  dextrose 50% Injectable 12.5 Gram(s) IV Push once  dextrose 50% Injectable 25 Gram(s) IV Push once  diphenoxylate/atropine 2 Tablet(s) Oral every 8 hours  enoxaparin Injectable 60 milliGRAM(s) SubCutaneous every 12 hours  fat emulsion (Fish Oil and Plant Based) 20% Infusion 0.7764 Gm/kG/Day (20.8 mL/Hr) IV Continuous <Continuous>  glucagon  Injectable 1 milliGRAM(s) IntraMuscular once  glucagon  Injectable 1 milliGRAM(s) IntraMuscular once  influenza  Vaccine (HIGH DOSE) 0.7 milliLiter(s) IntraMuscular once  insulin lispro (ADMELOG) corrective regimen sliding scale   SubCutaneous every 6 hours  lactated ringers Bolus 250 milliLiter(s) IV Bolus once  loperamide 4 milliGRAM(s) Oral every 8 hours  meropenem  IVPB 1000 milliGRAM(s) IV Intermittent every 8 hours  metoprolol succinate ER 25 milliGRAM(s) Oral daily  mirtazapine 30 milliGRAM(s) Oral at bedtime  multivitamin 1 Tablet(s) Oral daily  opium Tincture 6 milliGRAM(s) Oral every 12 hours  pantoprazole    Tablet 40 milliGRAM(s) Oral two times a day  Parenteral Nutrition - Adult 1 Each (83 mL/Hr) TPN Continuous <Continuous>  Parenteral Nutrition - Adult 1 Each (83 mL/Hr) TPN Continuous <Continuous>  psyllium Powder 1 Packet(s) Oral every 8 hours  sacubitril 24 mG/valsartan 26 mG 1 Tablet(s) Oral two times a day  spironolactone 25 milliGRAM(s) Oral daily  vancomycin  IVPB 1250 milliGRAM(s) IV Intermittent every 24 hours  zinc sulfate 220 milliGRAM(s) Oral daily    MEDICATIONS  (PRN):  acetaminophen     Tablet .. 650 milliGRAM(s) Oral every 6 hours PRN Temp greater or equal to 38C (100.4F), Mild Pain (1 - 3)  dextrose Oral Gel 15 Gram(s) Oral once PRN Blood Glucose LESS THAN 70 milliGRAM(s)/deciliter  dextrose Oral Gel 15 Gram(s) Oral once PRN Blood Glucose LESS THAN 70 milliGRAM(s)/deciliter  melatonin 3 milliGRAM(s) Oral at bedtime PRN Insomnia  ondansetron Injectable 4 milliGRAM(s) IV Push every 6 hours PRN Nausea and/or Vomiting  sodium chloride 0.9% lock flush 10 milliLiter(s) IV Push every 1 hour PRN Pre/post blood products, medications, blood draw, and to maintain line patency      Allergies    No Known Allergies    Intolerances        SOCIAL HISTORY:  Smoke: Never Smoker  EtOH: occasional    FAMILY HISTORY:      Vital Signs Last 24 Hrs  T(C): 36.1 (10 Feb 2023 17:40), Max: 38.1 (09 Feb 2023 22:01)  T(F): 97 (10 Feb 2023 17:40), Max: 100.5 (09 Feb 2023 22:01)  HR: 146 (10 Feb 2023 13:32) (104 - 168)  BP: 83/57 (10 Feb 2023 13:32) (83/57 - 128/85)  BP(mean): 64 (10 Feb 2023 13:32) (64 - 98)  RR: 16 (10 Feb 2023 13:20) (16 - 18)  SpO2: 97% (10 Feb 2023 13:20) (95% - 98%)    Parameters below as of 10 Feb 2023 11:55  Patient On (Oxygen Delivery Method): room air        PHYSICAL EXAM  Gen: Rigorous on initial exam, no acute distress   Neuro: A&oX3 no neuro deficits  HEENT: Dry mucous membranes   CV: Tachycardic, regular rhythm. No edema peripherally   Pulm: CTA B/L no w/w/r  Abd: Soft, NT/ND  Ext: No C/C/E  Skin: No rashes erythema or ecchymosis  MSK: No joint swelling noted  Psych: Normal affect     LABS:                        9.5    21.15 )-----------( 651      ( 10 Feb 2023 13:26 )             30.3     02-10    135  |  99  |  19  ----------------------------<  123<H>  4.6   |  22  |  0.70    Ca    9.5      10 Feb 2023 13:26  Phos  3.2     02-10  Mg     1.9     02-10    TPro  7.9  /  Alb  2.4<L>  /  TBili  0.7  /  DBili  x   /  AST  33  /  ALT  42  /  AlkPhos  222<H>  02-10    PT/INR - ( 10 Feb 2023 13:26 )   PT: 14.5 sec;   INR: 1.22          PTT - ( 10 Feb 2023 13:26 )  PTT:26.7 sec      RADIOLOGY & ADDITIONAL STUDIES:    Assessment and Plan:  78yMale HPI:  77M with PMHx of IVDU found unresponsive at his nursing home, resolved after Narcan in the field and brought to [facility]. Found to have PE, atrial flutter, and large LV thrombus on echo. Transferred to [facility] for further management. Pt c/o abdominal pain on 12/10 CTA showing mid SMA with embolus. Abnormal distal small bowel loops and cecum with dilatation and pneumatosis suggesting infarcted bowel. One or two tiny foci of intrahepatic portal vein pneumatosis. Segmental infarction upper pole left kidney. Now s/p Ex lap, SMA embolectomy, 80cm SBR, abthera vac left in discontinuity (12/11) and transferred to SICU intubated. S/p second look (12/13) and most recently s/p OR for 3rd look, end-to-end anastomosis of remaining bowel, loop ileostomy and abdomen closure (12/15). Patient was stepped down to telemetry, awaiting nutrition optimization for possible return to OR and eventual AKA. Coarse complicated by intermittent fevers.    SICU called to bedside. HR noted to be in the 170's, SBP in the 120's. Axillary temp 103. STAT blood cultures, lactate, chest x-ray ordered. On POCUS, A-line predominant. Greater than a liter reported out of the ostomy. 3 250cc boluses given in total for fluid resuscitation. IV Vanc and zosyn started. 1g IV tylenol given. CT C/A/P pending. Patient transferred to SICU for HD monitoring. Cardiology and ID consulted. Patient endorses not "feeling well" and being cold (rigors noted on exam). ROS otherwise negative.       PAST MEDICAL & SURGICAL HISTORY:  No past medical history       ROS: See HPI    MEDICATIONS  (STANDING):  aMIOdarone    Tablet 100 milliGRAM(s) Oral every 24 hours  ascorbic acid 500 milliGRAM(s) Oral daily  chlorhexidine 2% Cloths 1 Application(s) Topical <User Schedule>  cholestyramine Powder (Sugar-Free) 2 Gram(s) Oral three times a day  dextrose 5%. 1000 milliLiter(s) (50 mL/Hr) IV Continuous <Continuous>  dextrose 5%. 1000 milliLiter(s) (100 mL/Hr) IV Continuous <Continuous>  dextrose 5%. 1000 milliLiter(s) (50 mL/Hr) IV Continuous <Continuous>  dextrose 5%. 1000 milliLiter(s) (100 mL/Hr) IV Continuous <Continuous>  dextrose 50% Injectable 25 Gram(s) IV Push once  dextrose 50% Injectable 12.5 Gram(s) IV Push once  dextrose 50% Injectable 25 Gram(s) IV Push once  diphenoxylate/atropine 2 Tablet(s) Oral every 8 hours  enoxaparin Injectable 60 milliGRAM(s) SubCutaneous every 12 hours  fat emulsion (Fish Oil and Plant Based) 20% Infusion 0.7764 Gm/kG/Day (20.8 mL/Hr) IV Continuous <Continuous>  glucagon  Injectable 1 milliGRAM(s) IntraMuscular once  glucagon  Injectable 1 milliGRAM(s) IntraMuscular once  influenza  Vaccine (HIGH DOSE) 0.7 milliLiter(s) IntraMuscular once  insulin lispro (ADMELOG) corrective regimen sliding scale   SubCutaneous every 6 hours  lactated ringers Bolus 250 milliLiter(s) IV Bolus once  loperamide 4 milliGRAM(s) Oral every 8 hours  meropenem  IVPB 1000 milliGRAM(s) IV Intermittent every 8 hours  metoprolol succinate ER 25 milliGRAM(s) Oral daily  mirtazapine 30 milliGRAM(s) Oral at bedtime  multivitamin 1 Tablet(s) Oral daily  opium Tincture 6 milliGRAM(s) Oral every 12 hours  pantoprazole    Tablet 40 milliGRAM(s) Oral two times a day  Parenteral Nutrition - Adult 1 Each (83 mL/Hr) TPN Continuous <Continuous>  Parenteral Nutrition - Adult 1 Each (83 mL/Hr) TPN Continuous <Continuous>  psyllium Powder 1 Packet(s) Oral every 8 hours  sacubitril 24 mG/valsartan 26 mG 1 Tablet(s) Oral two times a day  spironolactone 25 milliGRAM(s) Oral daily  vancomycin  IVPB 1250 milliGRAM(s) IV Intermittent every 24 hours  zinc sulfate 220 milliGRAM(s) Oral daily    MEDICATIONS  (PRN):  acetaminophen     Tablet .. 650 milliGRAM(s) Oral every 6 hours PRN Temp greater or equal to 38C (100.4F), Mild Pain (1 - 3)  dextrose Oral Gel 15 Gram(s) Oral once PRN Blood Glucose LESS THAN 70 milliGRAM(s)/deciliter  dextrose Oral Gel 15 Gram(s) Oral once PRN Blood Glucose LESS THAN 70 milliGRAM(s)/deciliter  melatonin 3 milliGRAM(s) Oral at bedtime PRN Insomnia  ondansetron Injectable 4 milliGRAM(s) IV Push every 6 hours PRN Nausea and/or Vomiting  sodium chloride 0.9% lock flush 10 milliLiter(s) IV Push every 1 hour PRN Pre/post blood products, medications, blood draw, and to maintain line patency      Allergies  No Known Allergies    SOCIAL HISTORY:  Smoke: Never Smoker  EtOH: occasional    Vital Signs Last 24 Hrs  T(C): 36.1 (10 Feb 2023 17:40), Max: 38.1 (09 Feb 2023 22:01)  T(F): 97 (10 Feb 2023 17:40), Max: 100.5 (09 Feb 2023 22:01)  HR: 146 (10 Feb 2023 13:32) (104 - 168)  BP: 83/57 (10 Feb 2023 13:32) (83/57 - 128/85)  BP(mean): 64 (10 Feb 2023 13:32) (64 - 98)  RR: 16 (10 Feb 2023 13:20) (16 - 18)  SpO2: 97% (10 Feb 2023 13:20) (95% - 98%)    Parameters below as of 10 Feb 2023 11:55  Patient On (Oxygen Delivery Method): room air      PHYSICAL EXAM  Gen: Rigorous on initial exam, no acute distress   Neuro: A&oX3 no neuro deficits  HEENT: Dry mucous membranes   CV: Tachycardic, regular rhythm. No edema peripherally   Pulm: Lung sounds clear bilaterally   Abd: Soft, NT/ND  Ext: Left lower extremity dry gangrene, extending from toes to just below the knee. Wrapped with curlex  Skin: No rashes erythema or ecchymosis  MSK: No joint swelling noted  Psych: Normal affect     LABS:                        9.5    21.15 )-----------( 651      ( 10 Feb 2023 13:26 )             30.3     02-10    135  |  99  |  19  ----------------------------<  123<H>  4.6   |  22  |  0.70    Ca    9.5      10 Feb 2023 13:26  Phos  3.2     02-10  Mg     1.9     02-10    TPro  7.9  /  Alb  2.4<L>  /  TBili  0.7  /  DBili  x   /  AST  33  /  ALT  42  /  AlkPhos  222<H>  02-10    PT/INR - ( 10 Feb 2023 13:26 )   PT: 14.5 sec;   INR: 1.22          PTT - ( 10 Feb 2023 13:26 )  PTT:26.7 sec\    Blood Cultures Drawn    Urinalysis pending     Lactate 4.1, repeat pending       RADIOLOGY & ADDITIONAL STUDIES:    Chest x-ray pending

## 2023-02-10 NOTE — CONSULT NOTE ADULT - ASSESSMENT
77M with PMHx of IVDU found unresponsive at his nursing home, resolved after Narcan in the field and brought to [facility]. Found to have PE, atrial flutter, and large LV thrombus on echo. Transferred to [facility] for further management. Pt c/o abdominal pain on 12/10 CTA showing mid SMA with embolus. Abnormal distal small bowel loops and cecum with dilatation and pneumatosis suggesting infarcted bowel. One or two tiny foci of intrahepatic portal vein pneumatosis. Segmental infarction upper pole left kidney. Now s/p Ex lap, SMA embolectomy, 80cm SBR, abthera vac left in discontinuity (12/11) and transferred to SICU intubated. S/p second look (12/13) and most recently s/p OR for 3rd look, end-to-end anastomosis of remaining bowel, loop ileostomy and abdomen closure (12/15). Patient stepped down to tele awaiting possible return to OR. Coarse complicated by intermittent fevers. Patient transferred to SICU in the setting of sepsis for hemodynamic monitoring.     Plan:   Neuro: No active issues   CV: Hypotension 2/2 sepsis/hypovolemia. IV abx started. Hypovolemic: 750cc bolus in total (250ccs at a time). Maintain MAP >65. A.Fib/Flutter: s/p cardioversion. Continue metoprolol/amiodarone   Pulm: Sating well on nasal canula. Encourage IS.   GI/FEN: High Ostomy output. Replete. Continue Imodium Regular diet with PPN. Pending PICC for TPN   : Uriarte, strict I and O   Endo: mISS  Heme: LV thrombis w/ LLE ischemia. SQL. Active T&S;   ID: Luis (2/10--) Vanc (2/10--). dc//:: Zosyn d/c'ed 2/10.   PPX: SCDs. SQL   L/D/T: PIV  PT/OT: Not ordered  Dispo: SICU

## 2023-02-10 NOTE — PROGRESS NOTE ADULT - ASSESSMENT
77M with PMHx of IVDU found unresponsive at his nursing home, resolved after Narcan in the field and brought to [facility]. Found to have PE, atrial flutter, and large LV thrombus on echo. Transferred to [facility] for further management. Pt c/o abdominal pain on 12/10 CTA showing mid SMA with embolus. Abnormal distal small bowel loops and cecum with dilatation and pneumatosis suggesting infarcted bowel. One or two tiny foci of intrahepatic portal vein pneumatosis. Segmental infarction upper pole left kidney. Now s/p Ex lap, SMA embolectomy, 80cm SBR, abthera vac left in discontinuity (12/11) and transferred to SICU intubated. S/p second look (12/13) and most recently s/p OR for 3rd look, end-to-end anastomosis of remaining bowel, loop ileostomy and abdomen closure (12/15). Now stepped down to telemtry. LLE ischemia, AKA delayed by nutritional optimization.    Regular diet w/ ensures  PPN, transition to TPN with PICC today  Zosyn  Pain/nausea control  A flutter now s/p cardioversion, amio 100 QD, toprol XL 25 QD  LV thrombus w LLE limb ischemia  Lovenox  OOBA/IS  AKA likely outpatient  AM labs  Dispo: optimize nutrition status, possible OR next week for ileostomy reversal

## 2023-02-10 NOTE — PROVIDER CONTACT NOTE (CHANGE IN STATUS NOTIFICATION) - ACTION/TREATMENT ORDERED:
Additional safety precautions in place. RN stationed at bedside for pt safety. Psych team will BS contacted by SICU team for evaluation.
Fluids primed and on standby, will continue to monitor SBP.
Yamil Farrell advised she will be down at bedside. Ordered Chest X-ray, ABG, Cultures, Tylenol, Bolus of LR 250ccs.

## 2023-02-10 NOTE — CHART NOTE - NSCHARTNOTEFT_GEN_A_CORE
***Rapid Response Clinical Impact Advanced Care Provider Note***    Patient is a 79yo male w/PMH of IVDU found unresponsive at nursing home, resolved s/p Narcan via EMS en route to [facility], where he was found ot have PE, atrial flutter, and large LV thrombus on echo. Transferred to [facility] for further management. Pt c/o abd pain on 12/10 CTA showing SMA w/embolus w/abnormal distal small bowel loops and cecum with dilatation and pneumatosis suggesting infarcted bowel. One or two tiny foci of intrahepatic portal vein pneumatosis. Segmental infarction upper pole left kidney. Now s/p Ex lap, SMA embolectomy, 80cm SBR, abthera vac left in discontinuity (12/11) and transferred to SICU intubated. S/p second look (12/13) and most recently s/p OR for 3rd look, end-to-end anastomosis of remaining bowel, loop ileostomy and abdomen closure (12/15). Patient stepped down to tele awaiting possible return to OR. Coarse complicated by intermittent fevers.    Rapid response team called because patient noted to have HR in 170s.    Patient was seen and examined at the bedside by the rapid response team. On arrival to bedside, pt sitting slouched in bed, appears lethargic, however, is A&Ox3. Initial VS: HR 170s, BP 120s/70s, Axillary temp 103.1F, RR 22, O2 sat unable to achieve sustained value w/decent waveform, so abg sent & hyperoxygenated patient w/NRB. Low s/f hypoxia given A&Ox3, speaking full sentences without respiratory distress. PE notable for abd tenderness, ~800mL of yellow liquid stool output in ostomy, & skin hot ot touch. All PIVs infiltrated, so 2 large bore PIVs placed and stat labs sent, including blood cultures and lactate. 1000mg IV Tylenol given. Repeat BP 80s/50s. Pt w/hx of EF of ~15%, however, bedside POCUS shows a/b pattern to left lung, a line predominance to right, w/hyperdynamic heart so 500mL bolus given. HR improved to 130s-140s, BP improved to 110s/70s. EKG shows sinus tachycardia. Pt transferred to SICU for further care.    REVIEW OF SYSTEMS:    CONSTITUTIONAL: "Feels unwell", Chills  EYES/ENT: No visual changes;  No vertigo or throat pain   NECK: No pain or stiffness  RESPIRATORY: No cough, wheezing, hemoptysis; No shortness of breath  CARDIOVASCULAR: No chest pain or palpitations  GASTROINTESTINAL: No abdominal or epigastric pain. No nausea, vomiting, or hematemesis; No diarrhea or constipation. No melena or hematochezia.  GENITOURINARY: No dysuria, frequency or hematuria  NEUROLOGICAL: No numbness or weakness  SKIN: No itching, rashes      Allergies  No Known Allergies  Intolerances    PAST MEDICAL & SURGICAL HISTORY:    Vital Signs Last 24 Hrs  T(C): 36.1 (10 Feb 2023 17:40), Max: 39.9 (10 Feb 2023 14:45)  T(F): 97 (10 Feb 2023 17:40), Max: 103.8 (10 Feb 2023 14:45)  HR: 100 (10 Feb 2023 17:30) (100 - 168)  BP: 90/62 (10 Feb 2023 17:30) (81/61 - 128/85)  BP(mean): 71 (10 Feb 2023 17:30) (62 - 98)  RR: 24 (10 Feb 2023 17:30) (12 - 28)  SpO2: 94% (10 Feb 2023 16:30) (94% - 100%)    Parameters below as of 10 Feb 2023 17:30  Patient On (Oxygen Delivery Method): nasal cannula  O2 Flow (L/min): 2    GENERAL: The patient is lethargic, however, awake & alert, cachectic & ill-appearing.  HEENT: Head is normocephalic and atraumatic. Extraocular muscles are intact. Mucous membranes are dry. No throat erythema/exudates no lymphadenopathy, no JVD,   NECK: Supple.  LUNGS: Clear to auscultation BL without wheezing, rales or rhonchi; respirations unlabored  HEART: Tachycardic rate and rhythm ,+S1/+S2, no murmurs, rubs, gallops  ABDOMEN: Diffusely tender, ostomy in place draining yellow liquid stool, nondistended, no rebound, guarding rigidity  EXTREMITIES: Without any cyanosis, clubbing, rash, lesions or edema.  SKIN: Sacral wound  MSK: strength equal BL  VASCULAR: Radial and Dorsal pedal pulses palpable BL  NEUROLOGIC: Grossly intact.  PSYCH: No new changes.    02-09 @ 07:01  -  02-10 @ 07:00  --------------------------------------------------------  IN: 2315.6 mL / OUT: 3975 mL / NET: -1659.4 mL    02-10 @ 07:01  -  02-10 @ 20:43  --------------------------------------------------------  IN: 2746 mL / OUT: 2125 mL / NET: 621 mL                              9.5    21.15 )-----------( 651      ( 10 Feb 2023 13:26 )             30.3     02-10    135  |  99  |  19  ----------------------------<  123<H>  4.6   |  22  |  0.70    Ca    9.5      10 Feb 2023 13:26  Phos  3.2     02-10  Mg     1.9     02-10    TPro  7.9  /  Alb  2.4<L>  /  TBili  0.7  /  DBili  x   /  AST  33  /  ALT  42  /  AlkPhos  222<H>  02-10    ABG - ( 10 Feb 2023 13:28 )  pH, Arterial: 7.54  pH, Blood: x     /  pCO2: 26    /  pO2: 201   / HCO3: 22    / Base Excess: 1.0   /  SaO2: 99.2           LIVER FUNCTIONS - ( 10 Feb 2023 13:26 )  Alb: 2.4 g/dL / Pro: 7.9 g/dL / ALK PHOS: 222 U/L / ALT: 42 U/L / AST: 33 U/L / GGT: x            PT/INR - ( 10 Feb 2023 13:26 )   PT: 14.5 sec;   INR: 1.22     PTT - ( 10 Feb 2023 13:26 )  PTT:26.7 sec    MEDICATIONS  (STANDING):  aMIOdarone    Tablet 100 milliGRAM(s) Oral every 24 hours  ascorbic acid 500 milliGRAM(s) Oral daily  chlorhexidine 2% Cloths 1 Application(s) Topical <User Schedule>  cholestyramine Powder (Sugar-Free) 2 Gram(s) Oral three times a day  dextrose 5%. 1000 milliLiter(s) (50 mL/Hr) IV Continuous <Continuous>  dextrose 5%. 1000 milliLiter(s) (100 mL/Hr) IV Continuous <Continuous>  dextrose 5%. 1000 milliLiter(s) (50 mL/Hr) IV Continuous <Continuous>  dextrose 5%. 1000 milliLiter(s) (100 mL/Hr) IV Continuous <Continuous>  dextrose 50% Injectable 25 Gram(s) IV Push once  dextrose 50% Injectable 12.5 Gram(s) IV Push once  dextrose 50% Injectable 25 Gram(s) IV Push once  diphenoxylate/atropine 2 Tablet(s) Oral every 8 hours  enoxaparin Injectable 60 milliGRAM(s) SubCutaneous every 12 hours  fat emulsion (Fish Oil and Plant Based) 20% Infusion 0.7764 Gm/kG/Day (20.8 mL/Hr) IV Continuous <Continuous>  glucagon  Injectable 1 milliGRAM(s) IntraMuscular once  glucagon  Injectable 1 milliGRAM(s) IntraMuscular once  influenza  Vaccine (HIGH DOSE) 0.7 milliLiter(s) IntraMuscular once  insulin lispro (ADMELOG) corrective regimen sliding scale   SubCutaneous every 6 hours  loperamide 4 milliGRAM(s) Oral every 8 hours  meropenem  IVPB 1000 milliGRAM(s) IV Intermittent every 8 hours  metoprolol succinate ER 25 milliGRAM(s) Oral daily  mirtazapine 30 milliGRAM(s) Oral at bedtime  multivitamin 1 Tablet(s) Oral daily  opium Tincture 6 milliGRAM(s) Oral every 12 hours  pantoprazole    Tablet 40 milliGRAM(s) Oral two times a day  Parenteral Nutrition - Adult 1 Each (83 mL/Hr) TPN Continuous <Continuous>  Parenteral Nutrition - Adult 1 Each (83 mL/Hr) TPN Continuous <Continuous>  psyllium Powder 1 Packet(s) Oral every 8 hours  sacubitril 24 mG/valsartan 26 mG 1 Tablet(s) Oral two times a day  spironolactone 25 milliGRAM(s) Oral daily  vancomycin  IVPB 1250 milliGRAM(s) IV Intermittent every 24 hours  zinc sulfate 220 milliGRAM(s) Oral daily    MEDICATIONS  (PRN):  acetaminophen     Tablet .. 650 milliGRAM(s) Oral every 6 hours PRN Temp greater or equal to 38C (100.4F), Mild Pain (1 - 3)  dextrose Oral Gel 15 Gram(s) Oral once PRN Blood Glucose LESS THAN 70 milliGRAM(s)/deciliter  dextrose Oral Gel 15 Gram(s) Oral once PRN Blood Glucose LESS THAN 70 milliGRAM(s)/deciliter  melatonin 3 milliGRAM(s) Oral at bedtime PRN Insomnia  ondansetron Injectable 4 milliGRAM(s) IV Push every 6 hours PRN Nausea and/or Vomiting  sodium chloride 0.9% lock flush 10 milliLiter(s) IV Push every 1 hour PRN Pre/post blood products, medications, blood draw, and to maintain line patency      Assessment & Plan- Rapid Response called for a 79yo male w/PMH of IVDU w/prolonged admission, mesenteric ischemia s/p SBR 12/2022, c/b intra-abdominal hematoma, LE gangrene pending possible AKA, also w/now p/w hypotension & tachycardia, likely septic shock (likely abd fluid collection vs cdiff vs GI source) vs hypovolemia (moderate liquid stool output & insensible losses from persistent fever; also pending TPN)  -f/u stat CBC, CMP, lactate, troponin, mag, phos, coags  -f/u CXR  -f/u blood cultures  -stat CTAP (fluid collection on CTAP 1/26)  -send UA w/rflx  -send GI PCR panel & Cdiff  -consider culture of sacral wound if c/f infx  -would broaden abx to william & vanco  -maintain MAP >65, low threshold to initiate levophed if hypotensive s/p IVF given EF  -rest of care per primary team  -dispo: SICU    I have personally and independently provided 31 minutes of critical care services.  This excludes any time spent on separate procedures or teaching. ***Rapid Response Clinical Impact Advanced Care Provider Note***    Patient is a 79yo male w/PMH of IVDU found unresponsive at nursing home, resolved s/p Narcan via EMS en route to [facility], where he was found ot have PE, atrial flutter, and large LV thrombus on echo. Transferred to [facility] for further management. Pt c/o abd pain on 12/10 CTA showing SMA w/embolus w/abnormal distal small bowel loops and cecum with dilatation and pneumatosis suggesting infarcted bowel. One or two tiny foci of intrahepatic portal vein pneumatosis. Segmental infarction upper pole left kidney. Now s/p Ex lap, SMA embolectomy, 80cm SBR, abthera vac left in discontinuity (12/11) and transferred to SICU intubated. S/p second look (12/13) and most recently s/p OR for 3rd look, end-to-end anastomosis of remaining bowel, loop ileostomy and abdomen closure (12/15). Patient stepped down to tele awaiting possible return to OR. Coarse complicated by intermittent fevers.    Rapid response team called because patient noted to have HR in 170s.    Patient was seen and examined at the bedside by the rapid response team. On arrival to bedside, pt sitting slouched in bed, appears lethargic, however, is A&Ox3. Initial VS: HR 170s, BP 120s/70s, Axillary temp 103.1F, RR 22, O2 sat unable to achieve sustained value w/decent waveform, so abg sent & hyperoxygenated patient w/NRB. Low s/f hypoxia given A&Ox3, speaking full sentences without respiratory distress. PE notable for abd tenderness, ~800mL of yellow liquid stool output in ostomy, & skin hot ot touch. All PIVs infiltrated, so 2 large bore PIVs placed and stat labs sent, including blood cultures and lactate. 1000mg IV Tylenol given. Repeat BP 80s/50s. Pt w/hx of EF of ~15%, however, bedside POCUS shows a/b pattern to left lung, a line predominance to right, w/hyperdynamic heart so 500mL bolus given. HR improved to 130s-140s, BP improved to 110s/70s. EKG shows sinus tachycardia. Pt transferred to SICU for further care.    REVIEW OF SYSTEMS:    CONSTITUTIONAL: "Feels unwell", Chills  EYES/ENT: No visual changes;  No vertigo or throat pain   NECK: No pain or stiffness  RESPIRATORY: No cough, wheezing, hemoptysis; No shortness of breath  CARDIOVASCULAR: No chest pain or palpitations  GASTROINTESTINAL: No abdominal or epigastric pain. No nausea, vomiting, or hematemesis; No diarrhea or constipation. No melena or hematochezia.  GENITOURINARY: No dysuria, frequency or hematuria  NEUROLOGICAL: No numbness or weakness  SKIN: No itching, rashes      Allergies  No Known Allergies  Intolerances    PAST MEDICAL & SURGICAL HISTORY:    Vital Signs Last 24 Hrs  T(C): 36.1 (10 Feb 2023 17:40), Max: 39.9 (10 Feb 2023 14:45)  T(F): 97 (10 Feb 2023 17:40), Max: 103.8 (10 Feb 2023 14:45)  HR: 100 (10 Feb 2023 17:30) (100 - 168)  BP: 90/62 (10 Feb 2023 17:30) (81/61 - 128/85)  BP(mean): 71 (10 Feb 2023 17:30) (62 - 98)  RR: 24 (10 Feb 2023 17:30) (12 - 28)  SpO2: 94% (10 Feb 2023 16:30) (94% - 100%)    Parameters below as of 10 Feb 2023 17:30  Patient On (Oxygen Delivery Method): nasal cannula  O2 Flow (L/min): 2    GENERAL: The patient is lethargic, however, awake & alert, cachectic & ill-appearing.  HEENT: Head is normocephalic and atraumatic. Extraocular muscles are intact. Mucous membranes are dry. No throat erythema/exudates no lymphadenopathy, no JVD,   NECK: Supple.  LUNGS: Clear to auscultation BL without wheezing, rales or rhonchi; respirations unlabored  HEART: Tachycardic rate and rhythm ,+S1/+S2, no murmurs, rubs, gallops  ABDOMEN: Diffusely tender, ostomy in place draining yellow liquid stool, nondistended, no rebound, guarding rigidity  EXTREMITIES: Without any cyanosis, clubbing, rash, lesions or edema.  SKIN: Sacral wound  MSK: strength equal BL  VASCULAR: Radial and Dorsal pedal pulses palpable BL  NEUROLOGIC: Grossly intact.  PSYCH: No new changes.    02-09 @ 07:01  -  02-10 @ 07:00  --------------------------------------------------------  IN: 2315.6 mL / OUT: 3975 mL / NET: -1659.4 mL    02-10 @ 07:01  -  02-10 @ 20:43  --------------------------------------------------------  IN: 2746 mL / OUT: 2125 mL / NET: 621 mL                              9.5    21.15 )-----------( 651      ( 10 Feb 2023 13:26 )             30.3     02-10    135  |  99  |  19  ----------------------------<  123<H>  4.6   |  22  |  0.70    Ca    9.5      10 Feb 2023 13:26  Phos  3.2     02-10  Mg     1.9     02-10    TPro  7.9  /  Alb  2.4<L>  /  TBili  0.7  /  DBili  x   /  AST  33  /  ALT  42  /  AlkPhos  222<H>  02-10    ABG - ( 10 Feb 2023 13:28 )  pH, Arterial: 7.54  pH, Blood: x     /  pCO2: 26    /  pO2: 201   / HCO3: 22    / Base Excess: 1.0   /  SaO2: 99.2           LIVER FUNCTIONS - ( 10 Feb 2023 13:26 )  Alb: 2.4 g/dL / Pro: 7.9 g/dL / ALK PHOS: 222 U/L / ALT: 42 U/L / AST: 33 U/L / GGT: x            PT/INR - ( 10 Feb 2023 13:26 )   PT: 14.5 sec;   INR: 1.22     PTT - ( 10 Feb 2023 13:26 )  PTT:26.7 sec    MEDICATIONS  (STANDING):  aMIOdarone    Tablet 100 milliGRAM(s) Oral every 24 hours  ascorbic acid 500 milliGRAM(s) Oral daily  chlorhexidine 2% Cloths 1 Application(s) Topical <User Schedule>  cholestyramine Powder (Sugar-Free) 2 Gram(s) Oral three times a day  dextrose 5%. 1000 milliLiter(s) (50 mL/Hr) IV Continuous <Continuous>  dextrose 5%. 1000 milliLiter(s) (100 mL/Hr) IV Continuous <Continuous>  dextrose 5%. 1000 milliLiter(s) (50 mL/Hr) IV Continuous <Continuous>  dextrose 5%. 1000 milliLiter(s) (100 mL/Hr) IV Continuous <Continuous>  dextrose 50% Injectable 25 Gram(s) IV Push once  dextrose 50% Injectable 12.5 Gram(s) IV Push once  dextrose 50% Injectable 25 Gram(s) IV Push once  diphenoxylate/atropine 2 Tablet(s) Oral every 8 hours  enoxaparin Injectable 60 milliGRAM(s) SubCutaneous every 12 hours  fat emulsion (Fish Oil and Plant Based) 20% Infusion 0.7764 Gm/kG/Day (20.8 mL/Hr) IV Continuous <Continuous>  glucagon  Injectable 1 milliGRAM(s) IntraMuscular once  glucagon  Injectable 1 milliGRAM(s) IntraMuscular once  influenza  Vaccine (HIGH DOSE) 0.7 milliLiter(s) IntraMuscular once  insulin lispro (ADMELOG) corrective regimen sliding scale   SubCutaneous every 6 hours  loperamide 4 milliGRAM(s) Oral every 8 hours  meropenem  IVPB 1000 milliGRAM(s) IV Intermittent every 8 hours  metoprolol succinate ER 25 milliGRAM(s) Oral daily  mirtazapine 30 milliGRAM(s) Oral at bedtime  multivitamin 1 Tablet(s) Oral daily  opium Tincture 6 milliGRAM(s) Oral every 12 hours  pantoprazole    Tablet 40 milliGRAM(s) Oral two times a day  Parenteral Nutrition - Adult 1 Each (83 mL/Hr) TPN Continuous <Continuous>  Parenteral Nutrition - Adult 1 Each (83 mL/Hr) TPN Continuous <Continuous>  psyllium Powder 1 Packet(s) Oral every 8 hours  sacubitril 24 mG/valsartan 26 mG 1 Tablet(s) Oral two times a day  spironolactone 25 milliGRAM(s) Oral daily  vancomycin  IVPB 1250 milliGRAM(s) IV Intermittent every 24 hours  zinc sulfate 220 milliGRAM(s) Oral daily    MEDICATIONS  (PRN):  acetaminophen     Tablet .. 650 milliGRAM(s) Oral every 6 hours PRN Temp greater or equal to 38C (100.4F), Mild Pain (1 - 3)  dextrose Oral Gel 15 Gram(s) Oral once PRN Blood Glucose LESS THAN 70 milliGRAM(s)/deciliter  dextrose Oral Gel 15 Gram(s) Oral once PRN Blood Glucose LESS THAN 70 milliGRAM(s)/deciliter  melatonin 3 milliGRAM(s) Oral at bedtime PRN Insomnia  ondansetron Injectable 4 milliGRAM(s) IV Push every 6 hours PRN Nausea and/or Vomiting  sodium chloride 0.9% lock flush 10 milliLiter(s) IV Push every 1 hour PRN Pre/post blood products, medications, blood draw, and to maintain line patency      Assessment & Plan- Rapid Response called for a 79yo male w/PMH of IVDU w/prolonged admission, mesenteric ischemia s/p SBR 12/2022, c/b intra-abdominal hematoma, LE gangrene pending possible AKA, also w/now p/w hypotension & tachycardia, likely septic shock (likely abd fluid collection vs cdiff vs GI source) vs hypovolemia (moderate liquid stool output & insensible losses from persistent fever; also pending TPN)  -f/u stat CBC, CMP, lactate, troponin, mag, phos, coags  -f/u CXR  -f/u blood cultures  -stat CTAP (fluid collection on CTAP 1/26)  -send UA w/rflx  -send GI PCR panel & Cdiff  -send respiratory PCR  -consider culture of sacral wound if c/f infx  -would broaden abx to william & vanco  -maintain MAP >65, low threshold to initiate levophed if hypotensive s/p IVF given EF  -rest of care per primary team  -dispo: SICU    I have personally and independently provided 31 minutes of critical care services.  This excludes any time spent on separate procedures or teaching.

## 2023-02-10 NOTE — PROGRESS NOTE ADULT - SUBJECTIVE AND OBJECTIVE BOX
INTERVAL HPI/OVERNIGHT EVENTS:    ID asked to follow up regarding.  Increased WBC and lactic acidosis.  Patient transferred to SICU.  Not on pressors.      CONSTITUTIONAL:  Negative fever or chills, feels well, good appetite  EYES:  Negative  blurry vision or double vision  CARDIOVASCULAR:  Negative for chest pain or palpitations  RESPIRATORY:  Negative for cough, wheezing, or SOB   GASTROINTESTINAL:  Negative for nausea, vomiting, diarrhea, constipation, or abdominal pain  GENITOURINARY:  Negative frequency, urgency or dysuria  NEUROLOGIC:  No headache, confusion, dizziness, lightheadedness      ANTIBIOTICS/RELEVANT:    MEDICATIONS  (STANDING):  aMIOdarone    Tablet 100 milliGRAM(s) Oral every 24 hours  ascorbic acid 500 milliGRAM(s) Oral daily  chlorhexidine 2% Cloths 1 Application(s) Topical <User Schedule>  cholestyramine Powder (Sugar-Free) 2 Gram(s) Oral three times a day  dextrose 5%. 1000 milliLiter(s) (50 mL/Hr) IV Continuous <Continuous>  dextrose 5%. 1000 milliLiter(s) (100 mL/Hr) IV Continuous <Continuous>  dextrose 5%. 1000 milliLiter(s) (50 mL/Hr) IV Continuous <Continuous>  dextrose 5%. 1000 milliLiter(s) (100 mL/Hr) IV Continuous <Continuous>  dextrose 50% Injectable 25 Gram(s) IV Push once  dextrose 50% Injectable 12.5 Gram(s) IV Push once  dextrose 50% Injectable 25 Gram(s) IV Push once  diphenoxylate/atropine 2 Tablet(s) Oral every 8 hours  enoxaparin Injectable 60 milliGRAM(s) SubCutaneous every 12 hours  fat emulsion (Fish Oil and Plant Based) 20% Infusion 0.7764 Gm/kG/Day (20.8 mL/Hr) IV Continuous <Continuous>  glucagon  Injectable 1 milliGRAM(s) IntraMuscular once  glucagon  Injectable 1 milliGRAM(s) IntraMuscular once  influenza  Vaccine (HIGH DOSE) 0.7 milliLiter(s) IntraMuscular once  insulin lispro (ADMELOG) corrective regimen sliding scale   SubCutaneous every 6 hours  lactated ringers Bolus 250 milliLiter(s) IV Bolus once  loperamide 4 milliGRAM(s) Oral every 8 hours  metoprolol succinate ER 25 milliGRAM(s) Oral daily  mirtazapine 30 milliGRAM(s) Oral at bedtime  multivitamin 1 Tablet(s) Oral daily  opium Tincture 6 milliGRAM(s) Oral every 12 hours  pantoprazole    Tablet 40 milliGRAM(s) Oral two times a day  Parenteral Nutrition - Adult 1 Each (83 mL/Hr) TPN Continuous <Continuous>  Parenteral Nutrition - Adult 1 Each (83 mL/Hr) TPN Continuous <Continuous>  piperacillin/tazobactam IVPB.. 4.5 Gram(s) IV Intermittent every 8 hours  psyllium Powder 1 Packet(s) Oral every 8 hours  sacubitril 24 mG/valsartan 26 mG 1 Tablet(s) Oral two times a day  spironolactone 25 milliGRAM(s) Oral daily  vancomycin  IVPB 1250 milliGRAM(s) IV Intermittent every 24 hours  zinc sulfate 220 milliGRAM(s) Oral daily    MEDICATIONS  (PRN):  acetaminophen     Tablet .. 650 milliGRAM(s) Oral every 6 hours PRN Temp greater or equal to 38C (100.4F), Mild Pain (1 - 3)  dextrose Oral Gel 15 Gram(s) Oral once PRN Blood Glucose LESS THAN 70 milliGRAM(s)/deciliter  dextrose Oral Gel 15 Gram(s) Oral once PRN Blood Glucose LESS THAN 70 milliGRAM(s)/deciliter  melatonin 3 milliGRAM(s) Oral at bedtime PRN Insomnia  ondansetron Injectable 4 milliGRAM(s) IV Push every 6 hours PRN Nausea and/or Vomiting  sodium chloride 0.9% lock flush 10 milliLiter(s) IV Push every 1 hour PRN Pre/post blood products, medications, blood draw, and to maintain line patency        Vital Signs Last 24 Hrs  T(C): 37.5 (10 Feb 2023 09:04), Max: 38.1 (09 Feb 2023 22:01)  T(F): 99.5 (10 Feb 2023 09:04), Max: 100.5 (09 Feb 2023 22:01)  HR: 146 (10 Feb 2023 13:32) (104 - 168)  BP: 83/57 (10 Feb 2023 13:32) (83/57 - 128/85)  BP(mean): 64 (10 Feb 2023 13:32) (64 - 98)  RR: 16 (10 Feb 2023 13:20) (16 - 18)  SpO2: 97% (10 Feb 2023 13:20) (95% - 100%)    Parameters below as of 10 Feb 2023 11:55  Patient On (Oxygen Delivery Method): room air        PHYSICAL EXAM:  Constitutional:  non-toxic, no distress  Eyes:  no icterus   Ear/Nose/Throat: no oral lesion, no sinus tenderness on percussion	  Neck:  supple  Respiratory: CTA keerthi  Cardiovascular: S1S2 RRR, no murmurs  Gastrointestinal:  + ostomy bag with high output   Extremities: no edema  Vascular: DP Pulse:	right normal; left normal      LABS:                        9.5    21.15 )-----------( 651      ( 10 Feb 2023 13:26 )             30.3     02-10    135  |  99  |  19  ----------------------------<  123<H>  4.6   |  22  |  0.70    Ca    9.5      10 Feb 2023 13:26  Phos  3.2     02-10  Mg     1.9     02-10    TPro  7.9  /  Alb  2.4<L>  /  TBili  0.7  /  DBili  x   /  AST  33  /  ALT  42  /  AlkPhos  222<H>  02-10    PT/INR - ( 10 Feb 2023 13:26 )   PT: 14.5 sec;   INR: 1.22          PTT - ( 10 Feb 2023 13:26 )  PTT:26.7 sec      MICROBIOLOGY:    Culture - Blood (01.27.23 @ 16:22)    Specimen Source: .Blood Blood    Culture Results:   No growth at 5 days.    Culture - Blood (01.27.23 @ 16:15)    Specimen Source: .Blood Blood    Culture Results:   No growth at 5 days.        RADIOLOGY & ADDITIONAL STUDIES:    < from: CT Abdomen and Pelvis w/ Oral Cont and w/ IV Cont (01.26.23 @ 13:08) >  Speech recognition error in the body of the report correction as follows:  BOWEL/PERITONEUM: Contrast introduced through loop ileostomy, NO leak or   communication with slightly decreased right lower quadrant fluid   collection with thin rim of enhancement, 8.7 x 5.4 x 9.3 cm, previously   9.2 x 6.5 x 11 cm. Persistent cecal mural edema and mucosal   hyperenhancement, probably inflammatory or infectious. No bowel   obstruction. Small volume ascites.    < from: Xray Chest 1 View- PORTABLE-Urgent (Xray Chest 1 View- PORTABLE-Urgent .) (02.06.23 @ 06:52) >  IMPRESSION:    Lines/devices: None.    Lungs/pleura: Left costophrenic angle excluded. Within that limitation,   no focal consolidation or pleural effusion. No pneumothorax.    Cardiomediastinal silhouette: Withinnormal limits.    Other: Degenerative changes of the thoracic spine    < end of copied text >

## 2023-02-10 NOTE — BH CONSULTATION LIAISON PROGRESS NOTE - NSBHCHARTREVIEWLAB_PSY_A_CORE FT
10.1   11.38 )-----------( 348      ( 18 Jan 2023 05:30 )             31.4     01-18    133<L>  |  99  |  14  ----------------------------<  129<H>  3.5   |  25  |  0.54    Ca    8.5      18 Jan 2023 05:30  Phos  2.8     01-18  Mg     1.8     01-18    
                      10.0   11.16 )-----------( 317      ( 19 Jan 2023 09:03 )             31.3     01-19    134<L>  |  100  |  12  ----------------------------<  139<H>  4.1   |  27  |  0.59    Ca    8.6      19 Jan 2023 09:03  Phos  3.4     01-19  Mg     1.8     01-19    TPro  6.9  /  Alb  2.5<L>  /  TBili  0.7  /  DBili  x   /  AST  31  /  ALT  56<H>  /  AlkPhos  175<H>  01-19  
                      10.1   11.38 )-----------( 347      ( 17 Jan 2023 05:30 )             30.9     01-17    136  |  101  |  16  ----------------------------<  136<H>  3.5   |  26  |  0.60    Ca    8.3<L>      17 Jan 2023 05:30  Phos  2.6     01-17  Mg     1.9     01-17    TPro  7.0  /  Alb  2.7<L>  /  TBili  0.7  /  DBili  x   /  AST  41<H>  /  ALT  65<H>  /  AlkPhos  175<H>  01-17  
                      10.1   17.41 )-----------( 455      ( 27 Jan 2023 05:42 )             31.0     01-27    131<L>  |  100  |  22  ----------------------------<  127<H>  3.6   |  20<L>  |  0.58    Ca    8.5      27 Jan 2023 05:42  Phos  2.8     01-27  Mg     2.0     01-27    
                      9.5    21.15 )-----------( 651      ( 10 Feb 2023 13:26 )             30.3     02-10    135  |  99  |  19  ----------------------------<  123<H>  4.6   |  22  |  0.70    Ca    9.5      10 Feb 2023 13:26  Phos  3.2     02-10  Mg     1.9     02-10    TPro  7.9  /  Alb  2.4<L>  /  TBili  0.7  /  DBili  x   /  AST  33  /  ALT  42  /  AlkPhos  222<H>  02-10

## 2023-02-10 NOTE — CONSULT NOTE ADULT - ASSESSMENT
77y y/o Male with significant PMHx of IVDU found unresponsive at his nursing home, resolved after Narcan in the field, and brought to [facility]. Found to have PE, atrial flutter, and large LV thrombus on echo. Transferred to [facility] for further management. Pt C/o abdominal pain on 12/10 CTA showing mid SMA with embolus. Abnormal distal small bowel loops and cecum with dilatation and pneumatosis suggesting infarcted bowel. One or two tiny foci of  intrahepatic portal vein pneumatosis. Segmental infarction upper pole left kidney. Now s/p Ex lap, SMA embolectomy, 80cm SBR, abthera vac left in discontinuity (12/11) and transferred to SICU intubated. S/p second look (12/13) and most recently s/p OR for 3rd look, end-to-end anastomosis of remaining bowel, loop ileostomy and abdomen closure (12/15).  LLE Ischemia has not yet fully demarcated, it is clear that the posterior calf (which is needed to heal a BKA flap) is involved and therefore only surgical option available to the patient is an AKA pending medical optimization. LLE gangrene dry at this time. Pt with unclear source of infection at this time, lactate resolved and tachycardia much improved s/p fluid resuscitation.    - LLE unchanged from prior examinations, dry gangrene extending from toes to just below the knee, no wet gangrene observed  - No vascular surgery intervention at this time  - Patient can follow up with Dr. Greenberg when nutritional status is optimized for elective amputation  - Continue daily local wound care with betadine to all skin below the line of demarcation of his left shin and Kerlix. No offloading boot, keep left heel elevated off bed.   - Rest of care per primary team

## 2023-02-10 NOTE — PROGRESS NOTE ADULT - SUBJECTIVE AND OBJECTIVE BOX
SUBJECTIVE:  Patient seen and examined on AM rounds with chief resident. Febrile to 100.5F overnight. This morning, he is feeling ok. He wants to get his strength back and work with PT more. He denies abdominal pain, nausea, vomiting, fever, and chills. He is tolerating regular diet and getting PPN. Continues to have bowel function via ostomy. Voiding without issue. .    MEDICATIONS  (STANDING):  aMIOdarone    Tablet 100 milliGRAM(s) Oral every 24 hours  ascorbic acid 500 milliGRAM(s) Oral daily  chlorhexidine 2% Cloths 1 Application(s) Topical <User Schedule>  chlorhexidine 2% Cloths 1 Application(s) Topical <User Schedule>  cholestyramine Powder (Sugar-Free) 2 Gram(s) Oral three times a day  dextrose 5%. 1000 milliLiter(s) (50 mL/Hr) IV Continuous <Continuous>  dextrose 5%. 1000 milliLiter(s) (100 mL/Hr) IV Continuous <Continuous>  diphenoxylate/atropine 2 Tablet(s) Oral every 8 hours  enoxaparin Injectable 60 milliGRAM(s) SubCutaneous every 12 hours  fat emulsion (Fish Oil and Plant Based) 20% Infusion 0.7764 Gm/kG/Day (20.8 mL/Hr) IV Continuous <Continuous>  glucagon  Injectable 1 milliGRAM(s) IntraMuscular once  influenza  Vaccine (HIGH DOSE) 0.7 milliLiter(s) IntraMuscular once  insulin lispro (ADMELOG) corrective regimen sliding scale   SubCutaneous every 6 hours  loperamide 4 milliGRAM(s) Oral every 8 hours  metoprolol succinate ER 25 milliGRAM(s) Oral daily  mirtazapine 30 milliGRAM(s) Oral at bedtime  multivitamin 1 Tablet(s) Oral daily  opium Tincture 6 milliGRAM(s) Oral every 12 hours  pantoprazole    Tablet 40 milliGRAM(s) Oral two times a day  Parenteral Nutrition - Adult 1 Each (83 mL/Hr) TPN Continuous <Continuous>  piperacillin/tazobactam IVPB.. 3.375 Gram(s) IV Intermittent every 8 hours  psyllium Powder 1 Packet(s) Oral every 8 hours  sacubitril 24 mG/valsartan 26 mG 1 Tablet(s) Oral two times a day  spironolactone 25 milliGRAM(s) Oral daily  zinc sulfate 220 milliGRAM(s) Oral daily    MEDICATIONS  (PRN):  acetaminophen     Tablet .. 650 milliGRAM(s) Oral every 6 hours PRN Temp greater or equal to 38C (100.4F), Mild Pain (1 - 3)  dextrose Oral Gel 15 Gram(s) Oral once PRN Blood Glucose LESS THAN 70 milliGRAM(s)/deciliter  melatonin 3 milliGRAM(s) Oral at bedtime PRN Insomnia  ondansetron Injectable 4 milliGRAM(s) IV Push every 6 hours PRN Nausea and/or Vomiting  sodium chloride 0.9% lock flush 10 milliLiter(s) IV Push every 1 hour PRN Pre/post blood products, medications, blood draw, and to maintain line patency      Vital Signs Last 24 Hrs  T(C): 36.9 (10 Feb 2023 05:03), Max: 38.1 (09 Feb 2023 22:01)  T(F): 98.4 (10 Feb 2023 05:03), Max: 100.5 (09 Feb 2023 22:01)  HR: 108 (10 Feb 2023 03:53) (96 - 122)  BP: 100/60 (10 Feb 2023 03:53) (90/54 - 114/56)  BP(mean): 65 (10 Feb 2023 03:53) (65 - 81)  RR: 18 (10 Feb 2023 03:53) (18 - 18)  SpO2: 98% (10 Feb 2023 03:53) (97% - 100%)    Parameters below as of 10 Feb 2023 03:53  Patient On (Oxygen Delivery Method): room air    Physical Exam:  General: NAD, resting comfortably in bed  Pulmonary: Nonlabored breathing, no respiratory distress  Cardiovascular: NSR  Abdominal: soft, NT/ND, ostomy with stool burden  Extremities: WWP, normal strength    I&O's Summary    09 Feb 2023 07:01  -  10 Feb 2023 07:00  --------------------------------------------------------  IN: 2315.6 mL / OUT: 3975 mL / NET: -1659.4 mL        LABS:                        8.1    14.25 )-----------( 594      ( 09 Feb 2023 10:27 )             24.9     02-09    132<L>  |  98  |  19  ----------------------------<  125<H>  3.8   |  25  |  0.61    Ca    9.0      09 Feb 2023 10:27  Phos  2.8     02-09  Mg     1.8     02-09    TPro  7.3  /  Alb  2.1<L>  /  TBili  0.5  /  DBili  x   /  AST  16  /  ALT  25  /  AlkPhos  167<H>  02-09    PT/INR - ( 09 Feb 2023 10:27 )   PT: 14.9 sec;   INR: 1.25          PTT - ( 09 Feb 2023 10:27 )  PTT:32.1 sec    CAPILLARY BLOOD GLUCOSE      POCT Blood Glucose.: 150 mg/dL (09 Feb 2023 16:14)    LIVER FUNCTIONS - ( 09 Feb 2023 10:27 )  Alb: 2.1 g/dL / Pro: 7.3 g/dL / ALK PHOS: 167 U/L / ALT: 25 U/L / AST: 16 U/L / GGT: x

## 2023-02-10 NOTE — PROGRESS NOTE ADULT - SUBJECTIVE AND OBJECTIVE BOX
Tearful today.  Frustrated with ostomy.  Frustrated that his sacrum is sore.           PHYSICAL EXAM:    VITAL SIGNS:  Vital Signs Last 24 Hrs  T(C): 37.5 (10 Feb 2023 09:04), Max: 38.1 (09 Feb 2023 22:01)  T(F): 99.5 (10 Feb 2023 09:04), Max: 100.5 (09 Feb 2023 22:01)  HR: 140 (10 Feb 2023 11:55) (104 - 140)  BP: 128/85 (10 Feb 2023 11:55) (90/54 - 128/85)  BP(mean): 98 (10 Feb 2023 11:55) (65 - 98)  RR: 17 (10 Feb 2023 11:55) (17 - 18)  SpO2: 95% (10 Feb 2023 11:55) (95% - 100%)    Parameters below as of 10 Feb 2023 11:55  Patient On (Oxygen Delivery Method): room air          MEDICATIONS:  MEDICATIONS  (STANDING):  acetaminophen   IVPB .. 1000 milliGRAM(s) IV Intermittent once  aMIOdarone    Tablet 100 milliGRAM(s) Oral every 24 hours  ascorbic acid 500 milliGRAM(s) Oral daily  chlorhexidine 2% Cloths 1 Application(s) Topical <User Schedule>  chlorhexidine 2% Cloths 1 Application(s) Topical <User Schedule>  cholestyramine Powder (Sugar-Free) 2 Gram(s) Oral three times a day  dextrose 5%. 1000 milliLiter(s) (50 mL/Hr) IV Continuous <Continuous>  dextrose 5%. 1000 milliLiter(s) (100 mL/Hr) IV Continuous <Continuous>  diphenoxylate/atropine 2 Tablet(s) Oral every 8 hours  enoxaparin Injectable 60 milliGRAM(s) SubCutaneous every 12 hours  fat emulsion (Fish Oil and Plant Based) 20% Infusion 0.7764 Gm/kG/Day (20.8 mL/Hr) IV Continuous <Continuous>  glucagon  Injectable 1 milliGRAM(s) IntraMuscular once  influenza  Vaccine (HIGH DOSE) 0.7 milliLiter(s) IntraMuscular once  insulin lispro (ADMELOG) corrective regimen sliding scale   SubCutaneous every 6 hours  loperamide 4 milliGRAM(s) Oral every 8 hours  metoprolol succinate ER 25 milliGRAM(s) Oral daily  mirtazapine 30 milliGRAM(s) Oral at bedtime  multivitamin 1 Tablet(s) Oral daily  opium Tincture 6 milliGRAM(s) Oral every 12 hours  pantoprazole    Tablet 40 milliGRAM(s) Oral two times a day  Parenteral Nutrition - Adult 1 Each (83 mL/Hr) TPN Continuous <Continuous>  Parenteral Nutrition - Adult 1 Each (83 mL/Hr) TPN Continuous <Continuous>  psyllium Powder 1 Packet(s) Oral every 8 hours  sacubitril 24 mG/valsartan 26 mG 1 Tablet(s) Oral two times a day  spironolactone 25 milliGRAM(s) Oral daily  zinc sulfate 220 milliGRAM(s) Oral daily    MEDICATIONS  (PRN):  acetaminophen     Tablet .. 650 milliGRAM(s) Oral every 6 hours PRN Temp greater or equal to 38C (100.4F), Mild Pain (1 - 3)  dextrose Oral Gel 15 Gram(s) Oral once PRN Blood Glucose LESS THAN 70 milliGRAM(s)/deciliter  melatonin 3 milliGRAM(s) Oral at bedtime PRN Insomnia  ondansetron Injectable 4 milliGRAM(s) IV Push every 6 hours PRN Nausea and/or Vomiting  sodium chloride 0.9% lock flush 10 milliLiter(s) IV Push every 1 hour PRN Pre/post blood products, medications, blood draw, and to maintain line patency      ALLERGIES:  Allergies    No Known Allergies    Intolerances        LABS:                        8.1    14.25 )-----------( 594      ( 09 Feb 2023 10:27 )             24.9     02-09    132<L>  |  98  |  19  ----------------------------<  125<H>  3.8   |  25  |  0.61    Ca    9.0      09 Feb 2023 10:27  Phos  2.8     02-09  Mg     1.8     02-09    TPro  7.3  /  Alb  2.1<L>  /  TBili  0.5  /  DBili  x   /  AST  16  /  ALT  25  /  AlkPhos  167<H>  02-09    PT/INR - ( 09 Feb 2023 10:27 )   PT: 14.9 sec;   INR: 1.25          PTT - ( 09 Feb 2023 10:27 )  PTT:32.1 sec    CAPILLARY BLOOD GLUCOSE      POCT Blood Glucose.: 141 mg/dL (10 Feb 2023 11:42)      RADIOLOGY & ADDITIONAL TESTS: Reviewed.

## 2023-02-10 NOTE — CONSULT NOTE ADULT - ASSESSMENT
ASSESSMENT:   77M Hx  cocaine/opiate abuse and resident of a nursing facility presented from [facility] [place] after unresponsiveness alleviated w/ narcan, noted at that time to be in rapid atrial flutter w/ severe LV dysfunction and mobile LV thrombus as well as subsegemental PE and transferred to [facility] for further management. Course complicated by limb ischemia with arterial thrombosis of LLE vessels and subsequently developed abdominal pain and found to have acute mesenteric ischemia s/p emergent bowel resection and SMA thrombectomy. LV thrombus no longer apparent on TTE and has likely embolized. After prolonged open abdomen, septic shock and now s/p ileostomy. His course has been further complicated by hemodynamically significant GI bleeding. Of note patient, underwent LORENZO/DCCV on 12/30 and remains in sinus rhythm. Patient has been awaiting AKA with attempts to nutritionally optimize by primary team, for wound healing. Further complicated by intermittent fevers and significant ostomy output. Cardiology reconsulted for tachycardia, appears sinus.     Cardiac studies:   TTE: LV 5.0 cm, LVEF 10-15% with global hypokinesis, 2.5 cm mobile LV thrombus, severe RV dysfunction  LORENZO: no ISIAH thrombus    PLAN  Tachycardia  Appears to be sinus tachycardia, noted p waves on EKG and telemetry. Now improved post IVF and tylenol/fever improvement.   - Continue with infectious work up  - Patient remains     Acute Systolic Heart Failure  - Etiology: possible tachycardia induced and now s/p DCCV.. Pending ischemic evaluation as outpatient if LVEF remains reduced pending clinical status  - GDMT: Remains on metoprolol succinate 25mg qd, Entresto 24/26mg BID and spironolactone 25mg qd. Continue to defer SGLT2i for now  - Diuretics: Dry on exam. No indication for diuretic.  - Device: will need to be further evaluated in future.     Atrial flutter  - s/p DCCV 12/30. Remains in sinus, now with sinus tachycardia. Likely related to fevers and hypovolemia.  - CHADsVASC 3, continue anticoagulation currently on lovenox   - on amiodarone 100mg per EP, remains on Toprol 25     Acute limb ischemia and mesenteric ischemia from LV thrombus  - management per surgery/vascular, currently awaiting AKA as OP   - continue anticoagulation as above   ASSESSMENT:   77M Hx  cocaine/opiate abuse and resident of a nursing facility presented from [facility] after unresponsiveness alleviated w/ narcan, noted at that time to be in rapid atrial flutter w/ severe LV dysfunction and mobile LV thrombus as well as subsegemental PE and transferred to [facility] for further management. Course complicated by limb ischemia with arterial thrombosis of LLE vessels and subsequently developed abdominal pain and found to have acute mesenteric ischemia s/p emergent bowel resection and SMA thrombectomy. LV thrombus no longer apparent on TTE and has likely embolized. After prolonged open abdomen, septic shock and now s/p ileostomy. His course has been further complicated by hemodynamically significant GI bleeding. Of note patient, underwent LORENZO/DCCV on 12/30 and remains in sinus rhythm. Patient has been awaiting AKA with attempts to nutritionally optimize by primary team, for wound healing. Further complicated by intermittent fevers and significant ostomy output. Cardiology reconsulted for tachycardia, appears sinus.     Cardiac studies:   TTE: LV 5.0 cm, LVEF 10-15% with global hypokinesis, 2.5 cm mobile LV thrombus, severe RV dysfunction  LORENZO: no ISIAH thrombus    PLAN  Tachycardia  Appears to be sinus tachycardia, noted p waves on EKG and telemetry. Now improved post IVF and tylenol/fever improvement, rates in 90-100s.   - Continue with infectious work up/optimization  - Volume down, hold off diuretics, monitor osteomy output and volume as needed  - No further cardiac intervention.    Acute Systolic Heart Failure  - Etiology: possible tachycardia induced and now s/p DCCV.. Pending ischemic evaluation as outpatient if LVEF remains reduced pending clinical status  - GDMT: Remains on metoprolol succinate 25mg qd, Entresto 24/26mg BID and spironolactone 25mg qd. Continue to defer SGLT2i for now  - Diuretics: Dry on exam. No indication for diuretic.  - Device: will need to be further evaluated in future.     Atrial flutter  - s/p DCCV 12/30. Remains in sinus, now with sinus tachycardia. Likely related to fevers and hypovolemia.  - CHADsVASC 3, continue anticoagulation currently on lovenox   - on amiodarone 100mg per EP, remains on Toprol 25     Acute limb ischemia and mesenteric ischemia from LV thrombus  - management per surgery/vascular, currently awaiting AKA as OP   - continue anticoagulation as above    Discussed with Dr. Kim, consult attending. ASSESSMENT:   77M Hx  cocaine/opiate abuse and resident of a nursing facility presented from [facility] [place] after unresponsiveness alleviated w/ narcan, noted at that time to be in rapid atrial flutter w/ severe LV dysfunction and mobile LV thrombus as well as subsegemental PE and transferred to [facility] for further management. Course complicated by limb ischemia with arterial thrombosis of LLE vessels and subsequently developed abdominal pain and found to have acute mesenteric ischemia s/p emergent bowel resection and SMA thrombectomy. LV thrombus no longer apparent on TTE and has likely embolized. After prolonged open abdomen, septic shock and now s/p ileostomy. His course has been further complicated by hemodynamically significant GI bleeding. Of note patient, underwent LORENZO/DCCV on 12/30 and remains in sinus rhythm. Patient has been awaiting AKA with attempts to nutritionally optimize by primary team, for wound healing. Further complicated by intermittent fevers and significant ostomy output. Cardiology reconsulted for tachycardia, appears sinus.     Cardiac studies:   TTE: LV 5.0 cm, LVEF 10-15% with global hypokinesis, 2.5 cm mobile LV thrombus, severe RV dysfunction  LORENZO: no ISIAH thrombus    PLAN  Tachycardia  Appears to be sinus tachycardia, noted p waves on EKG and telemetry. Now improved post IVF and tylenol/fever improvement, rates in 90-100s.   - Continue with infectious work up/optimization  - Volume down, hold off diuretics, monitor osteomy output and volume as needed  - No further cardiac intervention.    Acute Systolic Heart Failure  - Etiology: possible tachycardia induced and now s/p DCCV.. Pending ischemic evaluation as outpatient if LVEF remains reduced pending clinical status  - GDMT: Remains on metoprolol succinate 25mg qd, Entresto 24/26mg BID and spironolactone 25mg qd. Continue to defer SGLT2i   - Diuretics: Dry on exam. No indication for diuretic.  - Device: will need to be further evaluated in future.     Atrial flutter  - s/p DCCV 12/30. Remains in sinus, now with sinus tachycardia. Likely related to fevers and hypovolemia.  - CHADsVASC 3, continue anticoagulation currently on lovenox   - on amiodarone 100mg per EP, remains on Toprol 25     Acute limb ischemia and mesenteric ischemia from LV thrombus  - management per surgery/vascular, currently awaiting AKA as OP   - continue anticoagulation as above    Discussed with Dr. Kim, consult attending.

## 2023-02-10 NOTE — PROVIDER CONTACT NOTE (CHANGE IN STATUS NOTIFICATION) - ASSESSMENT
Pt was tachy to the 160-170's, Temp 103, Hypotensive. Pt was shivering in bed.
Pt neuro intact, , Spo2 100.

## 2023-02-10 NOTE — PROVIDER CONTACT NOTE (CHANGE IN STATUS NOTIFICATION) - RECOMMENDATIONS
For team to come to bedside and to order Chest X-ray, ABG, Cultures, Tylenol, possibly upgrade the patient. Ice packs to lower temp. Bolus to raise SBP.  Rapid response to be called.

## 2023-02-10 NOTE — PROVIDER CONTACT NOTE (OTHER) - ASSESSMENT
Patient unintentionally removed R20G. L20G placed, but access infiltrated on assessment in the morning. Patient express despair r/t line placements and removals. Patient pending PICC line placement this morning.

## 2023-02-10 NOTE — BH CONSULTATION LIAISON PROGRESS NOTE - NSBHFUPINTERVALHXFT_PSY_A_CORE
The writer met with the patient for further assessment and f/u individual psychotherapy. The patient verbalized existential thoughts of death, though did not report any suicidal ideation, with the patient expressing a desire to get better physically. The writer reinforced the patient's work with the other CL team therapist, engaging the patient in discussion about positive memories related to art, and working with the patient to apply this skill in vivo when he transitioned from calm to momentary distress. The writer provided support throughout. Cognitive-behavioral therapy provided to improve patient mood. Consistent with previous report, the patient may be experiencing some confusion, which should continue to be monitored. No aggression noted or reported in session.

## 2023-02-10 NOTE — PROGRESS NOTE ADULT - ASSESSMENT
77 yo male with prolonged admission, mesenteric ischemia s/p SBR 12/2022, c/b intra-abdominal hematoma, LE gangrene pending possible AKA.  Patient was on zosyn for the past week and has now developed worsening leukocytosis and acidosis. Unclear source -  could be from undrained abscess    Recommend:  1.  Continue Vancomycin 1250 mg IV daily  2.  Stop Zosyn  3.  Start Meropenem 1 gram IV q8hrs  4.  Follow up blood cultures  5.  Agree with checking repeat CT abdomen/pelvis with IV contrast    ID team 1 will follow.  Dr. Jerome will cover the service tonight and this weekend. I will return on 2/13/23

## 2023-02-10 NOTE — PROGRESS NOTE ADULT - ASSESSMENT
77M first presented to [facility] from [facility] due to unresponsiveness (responded to Narcan). At [facility], found to have subsegmental pulmonary embolism in RUL, rapid atrial flutter with severely reduced LVEF with LV thrombus. Transferred to [facility] on 12/7/22 for LORENZO and possible ablation. At [facility], was found to have left leg ischemia (left leg arterial thrombus on LE duplex on 12/8). Was being considered for rhythm control when he developed abdominal pain, CTA (12/10/22) showed embolus in SMA, bowel infarct, requiring ex-lap on 12/11 with SMA embolectomy, 80cm small bowel resection; On 12/13, underwent 2nd look laparotomy, with 80cm small bowel resection, closure with abthera. On 12/15, underwent 3rd look laparotomy, end-to-end anastomosis of remaining bowel, loop ileostomy and abdominal closure. Now extubated, on tele floor. Eventually underwent LORENZO/DCCV on 12/30/22, now in sinus rhythm. Currently being evaluated for possible above knee amputation for LLE ischemia (possibly deferring till next hospitalization, after optimization of nutritional status).      #Fever  #Leukocytosis   -Pt with CT abd/pelvis which showed fluid collection. Pt was seen by IR re: fluid collection however the pt's imaging was reviewed by IR and it was determined that the fluid collection is not amenable to percutaneous drainage d/t bowel obstructing a safe window. May need to go to OR for washout of the infected area.  S/p GOC discussion with palliative care, and planning for return to OR for further surgery  - Continue to monitor WBC, patient has been refusing labs intermittently.  WBC up to 14 from yesterday.   - treated with zosyn  - possible OR next week for ostomy reversal.     #Hypotension  Difficult to assess etiology in view of comorbidities and lack of labs over the last few days. Primary team attempting to obtain labs, however patient has been refusing.   Improving BP, optimize patient volume status.     #Hyponatremia   - improved today - 132 yesterday.      # Left leg ischemia   - plan per vascular and primary team    #Suicidal Ideation  #Insomnia   - has been taken off 1:1  - Continuing mirtazapine to 30mg QHS for ongoing depressive sx and insomnia    # Acute Systolic Heart Failure   - Metoprolol succinate 25mg qd - continue    # Atrial Flutter  - s/p DCCV 12/30  - EP recs - uninterrupted AC x 30 days ; January 29th was 30 days post DCCV.   -Continue Lovenox, amiodarone and metoprolol succinate    # Pulmonary embolism (subsegmental RUL)   - After completion of AC for DCCV, would need to continue AC for PE - continuing lovenox at this time  - intermittently tachycardic    # Bowel ischemia - s/p SMA embolectomy; Small bowel resection  - ileostomy in place - planning for likely reversal next week as long as nutritional status is optimized  - plan of primary team  - loperamide and diphenoxylate atropine    # Severe protein-calorie malnutrition  # Sacral wound   - needs nutritional optimization  - Wound care per nursing protocol   - PICC Line placement today for TPN.     #DVT ppx -  lovenox for PE   Discussed with primary team

## 2023-02-10 NOTE — BH CONSULTATION LIAISON PROGRESS NOTE - OTHER
Initially reported not remembering yesterday's psychotherapy session though remembered with verbal prompting. Otherwise nothing unusual noted.

## 2023-02-10 NOTE — CONSULT NOTE ADULT - SUBJECTIVE AND OBJECTIVE BOX
Cardiology Consult Note    HPI:   TELEMETRY:    PAST MEDICAL & SURGICAL HISTORY:      MEDICATIONS  (STANDING):  aMIOdarone    Tablet 100 milliGRAM(s) Oral every 24 hours  ascorbic acid 500 milliGRAM(s) Oral daily  chlorhexidine 2% Cloths 1 Application(s) Topical <User Schedule>  cholestyramine Powder (Sugar-Free) 2 Gram(s) Oral three times a day  dextrose 5%. 1000 milliLiter(s) (50 mL/Hr) IV Continuous <Continuous>  dextrose 5%. 1000 milliLiter(s) (100 mL/Hr) IV Continuous <Continuous>  dextrose 5%. 1000 milliLiter(s) (50 mL/Hr) IV Continuous <Continuous>  dextrose 5%. 1000 milliLiter(s) (100 mL/Hr) IV Continuous <Continuous>  dextrose 50% Injectable 25 Gram(s) IV Push once  dextrose 50% Injectable 12.5 Gram(s) IV Push once  dextrose 50% Injectable 25 Gram(s) IV Push once  diphenoxylate/atropine 2 Tablet(s) Oral every 8 hours  enoxaparin Injectable 60 milliGRAM(s) SubCutaneous every 12 hours  fat emulsion (Fish Oil and Plant Based) 20% Infusion 0.7764 Gm/kG/Day (20.8 mL/Hr) IV Continuous <Continuous>  glucagon  Injectable 1 milliGRAM(s) IntraMuscular once  glucagon  Injectable 1 milliGRAM(s) IntraMuscular once  influenza  Vaccine (HIGH DOSE) 0.7 milliLiter(s) IntraMuscular once  insulin lispro (ADMELOG) corrective regimen sliding scale   SubCutaneous every 6 hours  lactated ringers Bolus 250 milliLiter(s) IV Bolus once  loperamide 4 milliGRAM(s) Oral every 8 hours  meropenem  IVPB 1000 milliGRAM(s) IV Intermittent every 8 hours  metoprolol succinate ER 25 milliGRAM(s) Oral daily  mirtazapine 30 milliGRAM(s) Oral at bedtime  multivitamin 1 Tablet(s) Oral daily  opium Tincture 6 milliGRAM(s) Oral every 12 hours  pantoprazole    Tablet 40 milliGRAM(s) Oral two times a day  Parenteral Nutrition - Adult 1 Each (83 mL/Hr) TPN Continuous <Continuous>  Parenteral Nutrition - Adult 1 Each (83 mL/Hr) TPN Continuous <Continuous>  psyllium Powder 1 Packet(s) Oral every 8 hours  sacubitril 24 mG/valsartan 26 mG 1 Tablet(s) Oral two times a day  spironolactone 25 milliGRAM(s) Oral daily  vancomycin  IVPB 1250 milliGRAM(s) IV Intermittent every 24 hours  zinc sulfate 220 milliGRAM(s) Oral daily    MEDICATIONS  (PRN):  acetaminophen     Tablet .. 650 milliGRAM(s) Oral every 6 hours PRN Temp greater or equal to 38C (100.4F), Mild Pain (1 - 3)  dextrose Oral Gel 15 Gram(s) Oral once PRN Blood Glucose LESS THAN 70 milliGRAM(s)/deciliter  dextrose Oral Gel 15 Gram(s) Oral once PRN Blood Glucose LESS THAN 70 milliGRAM(s)/deciliter  melatonin 3 milliGRAM(s) Oral at bedtime PRN Insomnia  ondansetron Injectable 4 milliGRAM(s) IV Push every 6 hours PRN Nausea and/or Vomiting  sodium chloride 0.9% lock flush 10 milliLiter(s) IV Push every 1 hour PRN Pre/post blood products, medications, blood draw, and to maintain line patency    Vital Signs Last 24 Hrs  T(C): 36.1 (10 Feb 2023 17:40), Max: 39.9 (10 Feb 2023 14:45)  T(F): 97 (10 Feb 2023 17:40), Max: 103.8 (10 Feb 2023 14:45)  HR: 100 (10 Feb 2023 17:30) (100 - 168)  BP: 90/62 (10 Feb 2023 17:30) (81/61 - 128/85)  BP(mean): 71 (10 Feb 2023 17:30) (62 - 98)  RR: 24 (10 Feb 2023 17:30) (12 - 28)  SpO2: 94% (10 Feb 2023 16:30) (94% - 100%)    Parameters below as of 10 Feb 2023 17:30  Patient On (Oxygen Delivery Method): nasal cannula  O2 Flow (L/min): 2      PHYSICAL EXAM:  GEN: Awake, alert. NAD.   HEENT: NCAT, PERRL, EOMI. Mucosa moist. No JVD.  RESP: CTA b/l  CV: RRR. Normal S1/S2. No m/r/g.  ABD: Soft. NT/ND. BS+  EXT: Warm. No edema, clubbing, or cyanosis.   NEURO: AAOx3. No focal deficits.     LABS:                        9.5    21.15 )-----------( 651      ( 10 Feb 2023 13:26 )             30.3     02-10    135  |  99  |  19  ----------------------------<  123<H>  4.6   |  22  |  0.70    Ca    9.5      10 Feb 2023 13:26  Phos  3.2     02-10  Mg     1.9     02-10    TPro  7.9  /  Alb  2.4<L>  /  TBili  0.7  /  DBili  x   /  AST  33  /  ALT  42  /  AlkPhos  222<H>  02-10        PT/INR - ( 10 Feb 2023 13:26 )   PT: 14.5 sec;   INR: 1.22          PTT - ( 10 Feb 2023 13:26 )  PTT:26.7 sec    I&O's Summary    09 Feb 2023 07:01  -  10 Feb 2023 07:00  --------------------------------------------------------  IN: 2315.6 mL / OUT: 3975 mL / NET: -1659.4 mL    10 Feb 2023 07:01  -  10 Feb 2023 17:56  --------------------------------------------------------  IN: 2746 mL / OUT: 2125 mL / NET: 621 mL      RADIOLOGY & ADDITIONAL STUDIES:    EKG:         Cardiology Consult Note    HPI: 78M Hx  cocaine/opiate abuse and resident of a nursing facility presented from [facility] [place] after unresponsiveness alleviated w/ narcan, noted at that time to be in rapid atrial flutter w/ severe LV dysfunction and mobile LV thrombus as well as subsegemental PE and transferred to [facility] for further management. Course complicated by limb ischemia with arterial thrombosis of LLE vessels and subsequently developed abdominal pain and found to have acute mesenteric ischemia s/p emergent bowel resection and SMA thrombectomy. LV thrombus no longer apparent on TTE and has likely embolized. After prolonged open abdomen, septic shock and now s/p ileostomy. His course has been further complicated by hemodynamically significant GI bleeding. Of note patient, underwent LORENZO/DCCV on 12/30 and remains in sinus rhythm. Patient has been awaiting AKA with attempts to nutritionally optimize by primary team, for wound healing. Has had intermittent fevers, most recently today, noted to be febrile to 103.8F with hypotension, tachycardia and noted to have significant ostomy output. Cardiology reconsulted for tachycardia, appears sinus. Rates as high as 150s but with clear P wave that improved with IVF and tylenol. Patient denies chest pain, no palpitations, no SOB. + Chills.   TELEMETRY: Sinus tachycardia, rates up to 140s on the monitor, now around 90-100s s/p IVF    PAST MEDICAL & SURGICAL HISTORY:      MEDICATIONS  (STANDING):  aMIOdarone    Tablet 100 milliGRAM(s) Oral every 24 hours  ascorbic acid 500 milliGRAM(s) Oral daily  chlorhexidine 2% Cloths 1 Application(s) Topical <User Schedule>  cholestyramine Powder (Sugar-Free) 2 Gram(s) Oral three times a day  dextrose 5%. 1000 milliLiter(s) (50 mL/Hr) IV Continuous <Continuous>  dextrose 5%. 1000 milliLiter(s) (100 mL/Hr) IV Continuous <Continuous>  dextrose 5%. 1000 milliLiter(s) (50 mL/Hr) IV Continuous <Continuous>  dextrose 5%. 1000 milliLiter(s) (100 mL/Hr) IV Continuous <Continuous>  dextrose 50% Injectable 25 Gram(s) IV Push once  dextrose 50% Injectable 12.5 Gram(s) IV Push once  dextrose 50% Injectable 25 Gram(s) IV Push once  diphenoxylate/atropine 2 Tablet(s) Oral every 8 hours  enoxaparin Injectable 60 milliGRAM(s) SubCutaneous every 12 hours  fat emulsion (Fish Oil and Plant Based) 20% Infusion 0.7764 Gm/kG/Day (20.8 mL/Hr) IV Continuous <Continuous>  glucagon  Injectable 1 milliGRAM(s) IntraMuscular once  glucagon  Injectable 1 milliGRAM(s) IntraMuscular once  influenza  Vaccine (HIGH DOSE) 0.7 milliLiter(s) IntraMuscular once  insulin lispro (ADMELOG) corrective regimen sliding scale   SubCutaneous every 6 hours  lactated ringers Bolus 250 milliLiter(s) IV Bolus once  loperamide 4 milliGRAM(s) Oral every 8 hours  meropenem  IVPB 1000 milliGRAM(s) IV Intermittent every 8 hours  metoprolol succinate ER 25 milliGRAM(s) Oral daily  mirtazapine 30 milliGRAM(s) Oral at bedtime  multivitamin 1 Tablet(s) Oral daily  opium Tincture 6 milliGRAM(s) Oral every 12 hours  pantoprazole    Tablet 40 milliGRAM(s) Oral two times a day  Parenteral Nutrition - Adult 1 Each (83 mL/Hr) TPN Continuous <Continuous>  Parenteral Nutrition - Adult 1 Each (83 mL/Hr) TPN Continuous <Continuous>  psyllium Powder 1 Packet(s) Oral every 8 hours  sacubitril 24 mG/valsartan 26 mG 1 Tablet(s) Oral two times a day  spironolactone 25 milliGRAM(s) Oral daily  vancomycin  IVPB 1250 milliGRAM(s) IV Intermittent every 24 hours  zinc sulfate 220 milliGRAM(s) Oral daily    MEDICATIONS  (PRN):  acetaminophen     Tablet .. 650 milliGRAM(s) Oral every 6 hours PRN Temp greater or equal to 38C (100.4F), Mild Pain (1 - 3)  dextrose Oral Gel 15 Gram(s) Oral once PRN Blood Glucose LESS THAN 70 milliGRAM(s)/deciliter  dextrose Oral Gel 15 Gram(s) Oral once PRN Blood Glucose LESS THAN 70 milliGRAM(s)/deciliter  melatonin 3 milliGRAM(s) Oral at bedtime PRN Insomnia  ondansetron Injectable 4 milliGRAM(s) IV Push every 6 hours PRN Nausea and/or Vomiting  sodium chloride 0.9% lock flush 10 milliLiter(s) IV Push every 1 hour PRN Pre/post blood products, medications, blood draw, and to maintain line patency    Vital Signs Last 24 Hrs  T(C): 36.1 (10 Feb 2023 17:40), Max: 39.9 (10 Feb 2023 14:45)  T(F): 97 (10 Feb 2023 17:40), Max: 103.8 (10 Feb 2023 14:45)  HR: 100 (10 Feb 2023 17:30) (100 - 168)  BP: 90/62 (10 Feb 2023 17:30) (81/61 - 128/85)  BP(mean): 71 (10 Feb 2023 17:30) (62 - 98)  RR: 24 (10 Feb 2023 17:30) (12 - 28)  SpO2: 94% (10 Feb 2023 16:30) (94% - 100%)    Parameters below as of 10 Feb 2023 17:30  Patient On (Oxygen Delivery Method): nasal cannula  O2 Flow (L/min): 2      PHYSICAL EXAM:  GEN: Awake, alert. NAD.   HEENT: NCAT, PERRL, EOMI. Mucosa moist. No JVD.  RESP: CTA b/l  CV: RRR. Normal S1/S2. No m/r/g.  ABD: Soft. NT/ND. BS+  EXT: Warm. No edema, clubbing, or cyanosis.   NEURO: AAOx3. No focal deficits.     LABS:                        9.5    21.15 )-----------( 651      ( 10 Feb 2023 13:26 )             30.3     02-10    135  |  99  |  19  ----------------------------<  123<H>  4.6   |  22  |  0.70    Ca    9.5      10 Feb 2023 13:26  Phos  3.2     02-10  Mg     1.9     02-10    TPro  7.9  /  Alb  2.4<L>  /  TBili  0.7  /  DBili  x   /  AST  33  /  ALT  42  /  AlkPhos  222<H>  02-10        PT/INR - ( 10 Feb 2023 13:26 )   PT: 14.5 sec;   INR: 1.22          PTT - ( 10 Feb 2023 13:26 )  PTT:26.7 sec    I&O's Summary    09 Feb 2023 07:01  -  10 Feb 2023 07:00  --------------------------------------------------------  IN: 2315.6 mL / OUT: 3975 mL / NET: -1659.4 mL    10 Feb 2023 07:01  -  10 Feb 2023 17:56  --------------------------------------------------------  IN: 2746 mL / OUT: 2125 mL / NET: 621 mL      RADIOLOGY & ADDITIONAL STUDIES:    EKG:         Cardiology Consult Note    HPI: 78M Hx  cocaine/opiate abuse and resident of a nursing facility presented from [facility] [place] after unresponsiveness alleviated w/ narcan, noted at that time to be in rapid atrial flutter w/ severe LV dysfunction and mobile LV thrombus as well as subsegemental PE and transferred to [facility] for further management. Course complicated by limb ischemia with arterial thrombosis of LLE vessels and subsequently developed abdominal pain and found to have acute mesenteric ischemia s/p emergent bowel resection and SMA thrombectomy. LV thrombus no longer apparent on TTE and has likely embolized. After prolonged open abdomen, septic shock and now s/p ileostomy. His course has been further complicated by hemodynamically significant GI bleeding. Of note patient, underwent LORENZO/DCCV on 12/30 and remains in sinus rhythm. Patient has been awaiting AKA with attempts to nutritionally optimize by primary team, for wound healing. Has had intermittent fevers, most recently today, noted to be febrile to 103.8F with hypotension, tachycardia and noted to have significant ostomy output. Cardiology reconsulted for tachycardia, appears sinus. Rates as high as 150s but with clear P wave that improved with IVF and tylenol. Patient denies chest pain, no palpitations, no SOB. + Chills.   TELEMETRY: Sinus tachycardia, rates up to 140s on the monitor, now around 90-100s s/p IVF    PAST MEDICAL & SURGICAL HISTORY:      MEDICATIONS  (STANDING):  aMIOdarone    Tablet 100 milliGRAM(s) Oral every 24 hours  ascorbic acid 500 milliGRAM(s) Oral daily  chlorhexidine 2% Cloths 1 Application(s) Topical <User Schedule>  cholestyramine Powder (Sugar-Free) 2 Gram(s) Oral three times a day  dextrose 5%. 1000 milliLiter(s) (50 mL/Hr) IV Continuous <Continuous>  dextrose 5%. 1000 milliLiter(s) (100 mL/Hr) IV Continuous <Continuous>  dextrose 5%. 1000 milliLiter(s) (50 mL/Hr) IV Continuous <Continuous>  dextrose 5%. 1000 milliLiter(s) (100 mL/Hr) IV Continuous <Continuous>  dextrose 50% Injectable 25 Gram(s) IV Push once  dextrose 50% Injectable 12.5 Gram(s) IV Push once  dextrose 50% Injectable 25 Gram(s) IV Push once  diphenoxylate/atropine 2 Tablet(s) Oral every 8 hours  enoxaparin Injectable 60 milliGRAM(s) SubCutaneous every 12 hours  fat emulsion (Fish Oil and Plant Based) 20% Infusion 0.7764 Gm/kG/Day (20.8 mL/Hr) IV Continuous <Continuous>  glucagon  Injectable 1 milliGRAM(s) IntraMuscular once  glucagon  Injectable 1 milliGRAM(s) IntraMuscular once  influenza  Vaccine (HIGH DOSE) 0.7 milliLiter(s) IntraMuscular once  insulin lispro (ADMELOG) corrective regimen sliding scale   SubCutaneous every 6 hours  lactated ringers Bolus 250 milliLiter(s) IV Bolus once  loperamide 4 milliGRAM(s) Oral every 8 hours  meropenem  IVPB 1000 milliGRAM(s) IV Intermittent every 8 hours  metoprolol succinate ER 25 milliGRAM(s) Oral daily  mirtazapine 30 milliGRAM(s) Oral at bedtime  multivitamin 1 Tablet(s) Oral daily  opium Tincture 6 milliGRAM(s) Oral every 12 hours  pantoprazole    Tablet 40 milliGRAM(s) Oral two times a day  Parenteral Nutrition - Adult 1 Each (83 mL/Hr) TPN Continuous <Continuous>  Parenteral Nutrition - Adult 1 Each (83 mL/Hr) TPN Continuous <Continuous>  psyllium Powder 1 Packet(s) Oral every 8 hours  sacubitril 24 mG/valsartan 26 mG 1 Tablet(s) Oral two times a day  spironolactone 25 milliGRAM(s) Oral daily  vancomycin  IVPB 1250 milliGRAM(s) IV Intermittent every 24 hours  zinc sulfate 220 milliGRAM(s) Oral daily    MEDICATIONS  (PRN):  acetaminophen     Tablet .. 650 milliGRAM(s) Oral every 6 hours PRN Temp greater or equal to 38C (100.4F), Mild Pain (1 - 3)  dextrose Oral Gel 15 Gram(s) Oral once PRN Blood Glucose LESS THAN 70 milliGRAM(s)/deciliter  dextrose Oral Gel 15 Gram(s) Oral once PRN Blood Glucose LESS THAN 70 milliGRAM(s)/deciliter  melatonin 3 milliGRAM(s) Oral at bedtime PRN Insomnia  ondansetron Injectable 4 milliGRAM(s) IV Push every 6 hours PRN Nausea and/or Vomiting  sodium chloride 0.9% lock flush 10 milliLiter(s) IV Push every 1 hour PRN Pre/post blood products, medications, blood draw, and to maintain line patency    Vital Signs Last 24 Hrs  T(C): 36.1 (10 Feb 2023 17:40), Max: 39.9 (10 Feb 2023 14:45)  T(F): 97 (10 Feb 2023 17:40), Max: 103.8 (10 Feb 2023 14:45)  HR: 100 (10 Feb 2023 17:30) (100 - 168)  BP: 90/62 (10 Feb 2023 17:30) (81/61 - 128/85)  BP(mean): 71 (10 Feb 2023 17:30) (62 - 98)  RR: 24 (10 Feb 2023 17:30) (12 - 28)  SpO2: 94% (10 Feb 2023 16:30) (94% - 100%)    Parameters below as of 10 Feb 2023 17:30  Patient On (Oxygen Delivery Method): nasal cannula  O2 Flow (L/min): 2      PHYSICAL EXAM:  GEN: Awake, alert. NAD.   HEENT: JVP normal  RESP: CTA b/l  CV: Tachycardiac  EXT: Warm. No edema   NEURO: AAOx3.     LABS:                        9.5    21.15 )-----------( 651      ( 10 Feb 2023 13:26 )             30.3     02-10    135  |  99  |  19  ----------------------------<  123<H>  4.6   |  22  |  0.70    Ca    9.5      10 Feb 2023 13:26  Phos  3.2     02-10  Mg     1.9     02-10    TPro  7.9  /  Alb  2.4<L>  /  TBili  0.7  /  DBili  x   /  AST  33  /  ALT  42  /  AlkPhos  222<H>  02-10        PT/INR - ( 10 Feb 2023 13:26 )   PT: 14.5 sec;   INR: 1.22          PTT - ( 10 Feb 2023 13:26 )  PTT:26.7 sec    I&O's Summary    09 Feb 2023 07:01  -  10 Feb 2023 07:00  --------------------------------------------------------  IN: 2315.6 mL / OUT: 3975 mL / NET: -1659.4 mL    10 Feb 2023 07:01  -  10 Feb 2023 17:56  --------------------------------------------------------  IN: 2746 mL / OUT: 2125 mL / NET: 621 mL      RADIOLOGY & ADDITIONAL STUDIES:    EKG:

## 2023-02-10 NOTE — CONSULT NOTE ADULT - SUBJECTIVE AND OBJECTIVE BOX
Attending:      HPI:  Patient is a 76 y/o male with no significant past medical history BIBA from [facility] due to unresponsiveness. Patient was reportedly found unresponsive at his nursing home, Narcan was given in the field with resolution, and patient was taken to [facility]. Pt was persistently tachycardic in 130-140s despite receiving Adenosine 6mg IV and then 12mg IV, Lopressor 2.5mg x2, PO Metoprolol tartrate 100mg. Utox was positive for opioids. CTPA suggestive of subsegmental PE in the right upper lobe. Echo showed severely reduced LV function with an LV thrombus. Heparin gtt was started. Cardiology and EP were consulted due to atrial flutter. EP suggested transfer to [facility] for LORENZO and possible ablation.  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 Vascular was reconsulted for re-evaluation of dry gangrene of leg in setting of sepsis. Pt complaints are unchanged at this time; occasional leg pain, high output ostomy, poor nutritional status.    MEDICATIONS  (STANDING):  aMIOdarone    Tablet 100 milliGRAM(s) Oral every 24 hours  ascorbic acid 500 milliGRAM(s) Oral daily  chlorhexidine 2% Cloths 1 Application(s) Topical <User Schedule>  cholestyramine Powder (Sugar-Free) 2 Gram(s) Oral three times a day  dextrose 5%. 1000 milliLiter(s) (50 mL/Hr) IV Continuous <Continuous>  dextrose 5%. 1000 milliLiter(s) (100 mL/Hr) IV Continuous <Continuous>  dextrose 5%. 1000 milliLiter(s) (50 mL/Hr) IV Continuous <Continuous>  dextrose 5%. 1000 milliLiter(s) (100 mL/Hr) IV Continuous <Continuous>  dextrose 50% Injectable 25 Gram(s) IV Push once  dextrose 50% Injectable 12.5 Gram(s) IV Push once  dextrose 50% Injectable 25 Gram(s) IV Push once  diphenoxylate/atropine 2 Tablet(s) Oral every 8 hours  enoxaparin Injectable 60 milliGRAM(s) SubCutaneous every 12 hours  fat emulsion (Fish Oil and Plant Based) 20% Infusion 0.7764 Gm/kG/Day (20.8 mL/Hr) IV Continuous <Continuous>  glucagon  Injectable 1 milliGRAM(s) IntraMuscular once  glucagon  Injectable 1 milliGRAM(s) IntraMuscular once  influenza  Vaccine (HIGH DOSE) 0.7 milliLiter(s) IntraMuscular once  insulin lispro (ADMELOG) corrective regimen sliding scale   SubCutaneous every 6 hours  loperamide 4 milliGRAM(s) Oral every 8 hours  meropenem  IVPB 1000 milliGRAM(s) IV Intermittent every 8 hours  metoprolol succinate ER 25 milliGRAM(s) Oral daily  mirtazapine 30 milliGRAM(s) Oral at bedtime  multivitamin 1 Tablet(s) Oral daily  opium Tincture 6 milliGRAM(s) Oral every 12 hours  pantoprazole    Tablet 40 milliGRAM(s) Oral two times a day  Parenteral Nutrition - Adult 1 Each (83 mL/Hr) TPN Continuous <Continuous>  Parenteral Nutrition - Adult 1 Each (83 mL/Hr) TPN Continuous <Continuous>  psyllium Powder 1 Packet(s) Oral every 8 hours  sacubitril 24 mG/valsartan 26 mG 1 Tablet(s) Oral two times a day  spironolactone 25 milliGRAM(s) Oral daily  vancomycin  IVPB 1250 milliGRAM(s) IV Intermittent every 24 hours  zinc sulfate 220 milliGRAM(s) Oral daily    MEDICATIONS  (PRN):  acetaminophen     Tablet .. 650 milliGRAM(s) Oral every 6 hours PRN Temp greater or equal to 38C (100.4F), Mild Pain (1 - 3)  dextrose Oral Gel 15 Gram(s) Oral once PRN Blood Glucose LESS THAN 70 milliGRAM(s)/deciliter  dextrose Oral Gel 15 Gram(s) Oral once PRN Blood Glucose LESS THAN 70 milliGRAM(s)/deciliter  melatonin 3 milliGRAM(s) Oral at bedtime PRN Insomnia  ondansetron Injectable 4 milliGRAM(s) IV Push every 6 hours PRN Nausea and/or Vomiting  sodium chloride 0.9% lock flush 10 milliLiter(s) IV Push every 1 hour PRN Pre/post blood products, medications, blood draw, and to maintain line patency      Vital Signs Last 24 Hrs  T(C): 36.1 (10 Feb 2023 17:40), Max: 39.9 (10 Feb 2023 14:45)  T(F): 97 (10 Feb 2023 17:40), Max: 103.8 (10 Feb 2023 14:45)  HR: 100 (10 Feb 2023 17:30) (100 - 168)  BP: 90/62 (10 Feb 2023 17:30) (81/61 - 128/85)  BP(mean): 71 (10 Feb 2023 17:30) (62 - 98)  RR: 24 (10 Feb 2023 17:30) (12 - 28)  SpO2: 94% (10 Feb 2023 16:30) (94% - 100%)    Parameters below as of 10 Feb 2023 17:30  Patient On (Oxygen Delivery Method): nasal cannula  O2 Flow (L/min): 2      I&O's Summary    09 Feb 2023 07:01  -  10 Feb 2023 07:00  --------------------------------------------------------  IN: 2315.6 mL / OUT: 3975 mL / NET: -1659.4 mL    10 Feb 2023 07:01  -  10 Feb 2023 19:34  --------------------------------------------------------  IN: 2746 mL / OUT: 2125 mL / NET: 621 mL        Physical Exam:  General: NAD, resting comfortably  HEENT: NC/AT, EOMI, normal hearing  Pulmonary: normal resp effort, CTA-B  Cardiovascular: NSR  Abdominal: soft, NT/ND, no organomegaly  Extremities: WWP, normal strength, no clubbing/cyanosis/edema  Neuro: A/O x 3, CNs II-XII grossly intact, normal sensation, no focal deficits    LABS:                        9.5    21.15 )-----------( 651      ( 10 Feb 2023 13:26 )             30.3     02-10    135  |  99  |  19  ----------------------------<  123<H>  4.6   |  22  |  0.70    Ca    9.5      10 Feb 2023 13:26  Phos  3.2     02-10  Mg     1.9     02-10    TPro  7.9  /  Alb  2.4<L>  /  TBili  0.7  /  DBili  x   /  AST  33  /  ALT  42  /  AlkPhos  222<H>  02-10    PT/INR - ( 10 Feb 2023 13:26 )   PT: 14.5 sec;   INR: 1.22          PTT - ( 10 Feb 2023 13:26 )  PTT:26.7 sec    CAPILLARY BLOOD GLUCOSE      POCT Blood Glucose.: 91 mg/dL (10 Feb 2023 19:31)  POCT Blood Glucose.: 115 mg/dL (10 Feb 2023 13:09)  POCT Blood Glucose.: 141 mg/dL (10 Feb 2023 11:42)    LIVER FUNCTIONS - ( 10 Feb 2023 13:26 )  Alb: 2.4 g/dL / Pro: 7.9 g/dL / ALK PHOS: 222 U/L / ALT: 42 U/L / AST: 33 U/L / GGT: x

## 2023-02-10 NOTE — BH CONSULTATION LIAISON PROGRESS NOTE - NSBHATTESTCOMMENTATTENDFT_PSY_A_CORE
Attempted to see patient this afternoon but rapid response in progress and pt subsequently transferred to  for SICU care. Will remain available as needed.

## 2023-02-10 NOTE — CHART NOTE - NSCHARTNOTEFT_GEN_A_CORE
Rapid Response: Sustained tachycardia to 170s with Tmax 103.     77M PMHx of IVDU, and prolonged hospital course d/t mesenteric ischemia d/t SMA embolus now s/p ex lap, SMA embolectomy, SBR (12/11) second look (12/13) and 3rd look for end-to-end anastomosis of remaining bowel, loop ileostomy and abdomen closure (12/15). Hospital course is notable for LLE ischemia, failure to thrive, GOLDIE, HFrEF (10-15%), LV thrombus, high ileostomy output. Rapid called 13:06 for sustained tachycardia (15-20min), Tmax 103 (axillary) with rigors. Rapid response team, primary team, and ICU team were at bedside. Pt was responsive to verbal stimuli and opening eyes. Was rigoring, but otherwise had no complaints. Vitals notable for -160s, -120/60s, SpO2 not obtained on with pulse oxygen. Abdomen was soft, scaphoid, nTTP, without rebound or guarding. Ileostomy with light yellow output, p/p/p. Extremities were warm, dry, with LUE edematous- PIV noted to be infiltrated. POCUS notable for b-lines on lungs b/l with moderate volume in bladder. . PIV's x2 placed in RUE and stat labs sent and noted below including lactate 4.1.             9.5    21.15 )-----------( 651      ( 10 Feb 2023 13:26 )             30.3     02-10    135  |  99  |  19  ----------------------------<  123<H>  4.6   |  22  |  0.70    Ca    9.5      10 Feb 2023 13:26  Phos  3.2     02-10  Mg     1.9     02-10    TPro  7.9  /  Alb  2.4<L>  /  TBili  0.7  /  DBili  x   /  AST  33  /  ALT  42  /  AlkPhos  222<H>  02-10    Sepsis of unknown etiology with superimposed hypovolemia from ileostomy output. Pt bolused 250cc and given ofirmev 1g. SICU consulted for transfer and recommend ID consult for further broadening of antibiotics. Will obtained C Diff. Once pt hemodynamically stable, obtained CT C/A/P with IV contrast. Plan discussed with surgery attending and in agreement.

## 2023-02-10 NOTE — CHART NOTE - NSCHARTNOTEFT_GEN_A_CORE
Admitting Diagnosis:   Patient is a 78y old  Male who presents with a chief complaint of A flutter (10 Feb 2023 13:09)  PAST MEDICAL & SURGICAL HISTORY:  Current Nutrition Order:  Diet, NPO (02-10-23 @ 13:23)    PO Intake: Good (%) [   ]  Fair (50-75%) [   ] Poor (<25%) [   ] * NPO with TPN [ x ]     GI Issues:   No noted N/V reported per pt's RN  Ileostomy (3475mL in the past 24h)  No abdominal discomfort or distention noted    Pain:   denies pain/discomfort per chart, medical pain management (pain regimen) in place    Skin Integrity:  Surgical incision noted  Buddy: 12  No edema noted  pressure ulcer: sacral spine stage IV pressure ulcer, unstageable upper back pressure ulcer  *pt has been ordered MVI, vitamin C and zinc sulfate*     Labs:   02-10    135  |  99  |  19  ----------------------------<  123<H>  4.6   |  22  |  0.70    Ca    9.5      10 Feb 2023 13:26  Phos  3.2     02-10  Mg     1.9     02-10    TPro  7.9  /  Alb  2.4<L>  /  TBili  0.7  /  DBili  x   /  AST  33  /  ALT  42  /  AlkPhos  222<H>  02-10    CAPILLARY BLOOD GLUCOSE      POCT Blood Glucose.: 115 mg/dL (10 Feb 2023 13:09)  POCT Blood Glucose.: 141 mg/dL (10 Feb 2023 11:42)  POCT Blood Glucose.: 150 mg/dL (09 Feb 2023 16:14)      Medications:  MEDICATIONS  (STANDING):  aMIOdarone    Tablet 100 milliGRAM(s) Oral every 24 hours  ascorbic acid 500 milliGRAM(s) Oral daily  chlorhexidine 2% Cloths 1 Application(s) Topical <User Schedule>  cholestyramine Powder (Sugar-Free) 2 Gram(s) Oral three times a day  dextrose 5%. 1000 milliLiter(s) (50 mL/Hr) IV Continuous <Continuous>  dextrose 5%. 1000 milliLiter(s) (100 mL/Hr) IV Continuous <Continuous>  dextrose 5%. 1000 milliLiter(s) (50 mL/Hr) IV Continuous <Continuous>  dextrose 5%. 1000 milliLiter(s) (100 mL/Hr) IV Continuous <Continuous>  dextrose 50% Injectable 25 Gram(s) IV Push once  dextrose 50% Injectable 12.5 Gram(s) IV Push once  dextrose 50% Injectable 25 Gram(s) IV Push once  diphenoxylate/atropine 2 Tablet(s) Oral every 8 hours  enoxaparin Injectable 60 milliGRAM(s) SubCutaneous every 12 hours  fat emulsion (Fish Oil and Plant Based) 20% Infusion 0.7764 Gm/kG/Day (20.8 mL/Hr) IV Continuous <Continuous>  glucagon  Injectable 1 milliGRAM(s) IntraMuscular once  glucagon  Injectable 1 milliGRAM(s) IntraMuscular once  influenza  Vaccine (HIGH DOSE) 0.7 milliLiter(s) IntraMuscular once  insulin lispro (ADMELOG) corrective regimen sliding scale   SubCutaneous every 6 hours  lactated ringers Bolus 500 milliLiter(s) IV Bolus once  loperamide 4 milliGRAM(s) Oral every 8 hours  metoprolol succinate ER 25 milliGRAM(s) Oral daily  mirtazapine 30 milliGRAM(s) Oral at bedtime  multivitamin 1 Tablet(s) Oral daily  opium Tincture 6 milliGRAM(s) Oral every 12 hours  pantoprazole    Tablet 40 milliGRAM(s) Oral two times a day  Parenteral Nutrition - Adult 1 Each (83 mL/Hr) TPN Continuous <Continuous>  Parenteral Nutrition - Adult 1 Each (83 mL/Hr) TPN Continuous <Continuous>  piperacillin/tazobactam IVPB.. 4.5 Gram(s) IV Intermittent every 8 hours  psyllium Powder 1 Packet(s) Oral every 8 hours  sacubitril 24 mG/valsartan 26 mG 1 Tablet(s) Oral two times a day  spironolactone 25 milliGRAM(s) Oral daily  vancomycin  IVPB 1250 milliGRAM(s) IV Intermittent every 24 hours  zinc sulfate 220 milliGRAM(s) Oral daily    MEDICATIONS  (PRN):  acetaminophen     Tablet .. 650 milliGRAM(s) Oral every 6 hours PRN Temp greater or equal to 38C (100.4F), Mild Pain (1 - 3)  dextrose Oral Gel 15 Gram(s) Oral once PRN Blood Glucose LESS THAN 70 milliGRAM(s)/deciliter  dextrose Oral Gel 15 Gram(s) Oral once PRN Blood Glucose LESS THAN 70 milliGRAM(s)/deciliter  melatonin 3 milliGRAM(s) Oral at bedtime PRN Insomnia  ondansetron Injectable 4 milliGRAM(s) IV Push every 6 hours PRN Nausea and/or Vomiting  sodium chloride 0.9% lock flush 10 milliLiter(s) IV Push every 1 hour PRN Pre/post blood products, medications, blood draw, and to maintain line patency      Weight:  Daily     Daily     Weight Change:     Nutrition Focused Physical Exam: Completed [   ]  Not Pertinent [   ]  Muscle Wasting- Temporal [   ]  Clavicle/Pectoral [   ]  Shoulder/Deltoid [   ]  Scapula [   ]  Interosseous [   ]  Quadriceps [   ]  Gastrocnemius [   ]  Fat Wasting- Orbital [   ]  Buccal [   ]  Triceps [   ]  Rib [   ]  Suspect [PCM] 2/2 to physical assessment, [poor intake], and [wt loss]; please see malnutrition chart note.    Estimated energy needs:     Subjective:     Previous Nutrition Diagnosis:    Active [   ]  Resolved [   ]    If resolved, new PES:     Goal:    Recommendations:    Education:     Risk Level: High [   ] Moderate [   ] Low [   ] Admitting Diagnosis:   Patient is a 78y old  Male who presents with a chief complaint of A flutter (10 Feb 2023 13:09)  PAST MEDICAL & SURGICAL HISTORY:  Current Nutrition Order:  Diet, NPO (02-10-23 @ 13:23)    PO Intake: Good (%) [   ]  Fair (50-75%) [   ] Poor (<25%) [   ] * NPO with TPN [ x ]     GI Issues:   No noted N/V reported per pt's RN  Ileostomy (3475mL in the past 24h)  No abdominal discomfort or distention noted    Pain:   denies pain/discomfort per chart, medical pain management (pain regimen) in place    Skin Integrity:  Surgical incision noted  Buddy: 12  No edema noted  pressure ulcer: sacral spine stage IV pressure ulcer, unstageable upper back pressure ulcer  *pt has been ordered MVI, vitamin C and zinc sulfate*     Labs:   02-10    135  |  99  |  19  ----------------------------<  123<H>  4.6   |  22  |  0.70    Ca    9.5      10 Feb 2023 13:26  Phos  3.2     02-10  Mg     1.9     02-10    TPro  7.9  /  Alb  2.4<L>  /  TBili  0.7  /  DBili  x   /  AST  33  /  ALT  42  /  AlkPhos  222<H>  02-10    CAPILLARY BLOOD GLUCOSE    POCT Blood Glucose.: 115 mg/dL (10 Feb 2023 13:09)  POCT Blood Glucose.: 141 mg/dL (10 Feb 2023 11:42)  POCT Blood Glucose.: 150 mg/dL (09 Feb 2023 16:14)    Medications:  MEDICATIONS  (STANDING):  aMIOdarone    Tablet 100 milliGRAM(s) Oral every 24 hours  ascorbic acid 500 milliGRAM(s) Oral daily  chlorhexidine 2% Cloths 1 Application(s) Topical <User Schedule>  cholestyramine Powder (Sugar-Free) 2 Gram(s) Oral three times a day  dextrose 5%. 1000 milliLiter(s) (50 mL/Hr) IV Continuous <Continuous>  dextrose 5%. 1000 milliLiter(s) (100 mL/Hr) IV Continuous <Continuous>  dextrose 5%. 1000 milliLiter(s) (50 mL/Hr) IV Continuous <Continuous>  dextrose 5%. 1000 milliLiter(s) (100 mL/Hr) IV Continuous <Continuous>  dextrose 50% Injectable 25 Gram(s) IV Push once  dextrose 50% Injectable 12.5 Gram(s) IV Push once  dextrose 50% Injectable 25 Gram(s) IV Push once  diphenoxylate/atropine 2 Tablet(s) Oral every 8 hours  enoxaparin Injectable 60 milliGRAM(s) SubCutaneous every 12 hours  fat emulsion (Fish Oil and Plant Based) 20% Infusion 0.7764 Gm/kG/Day (20.8 mL/Hr) IV Continuous <Continuous>  glucagon  Injectable 1 milliGRAM(s) IntraMuscular once  glucagon  Injectable 1 milliGRAM(s) IntraMuscular once  influenza  Vaccine (HIGH DOSE) 0.7 milliLiter(s) IntraMuscular once  insulin lispro (ADMELOG) corrective regimen sliding scale   SubCutaneous every 6 hours  lactated ringers Bolus 500 milliLiter(s) IV Bolus once  loperamide 4 milliGRAM(s) Oral every 8 hours  metoprolol succinate ER 25 milliGRAM(s) Oral daily  mirtazapine 30 milliGRAM(s) Oral at bedtime  multivitamin 1 Tablet(s) Oral daily  opium Tincture 6 milliGRAM(s) Oral every 12 hours  pantoprazole    Tablet 40 milliGRAM(s) Oral two times a day  Parenteral Nutrition - Adult 1 Each (83 mL/Hr) TPN Continuous <Continuous>  Parenteral Nutrition - Adult 1 Each (83 mL/Hr) TPN Continuous <Continuous>  piperacillin/tazobactam IVPB.. 4.5 Gram(s) IV Intermittent every 8 hours  psyllium Powder 1 Packet(s) Oral every 8 hours  sacubitril 24 mG/valsartan 26 mG 1 Tablet(s) Oral two times a day  spironolactone 25 milliGRAM(s) Oral daily  vancomycin  IVPB 1250 milliGRAM(s) IV Intermittent every 24 hours  zinc sulfate 220 milliGRAM(s) Oral daily    MEDICATIONS  (PRN):  acetaminophen     Tablet .. 650 milliGRAM(s) Oral every 6 hours PRN Temp greater or equal to 38C (100.4F), Mild Pain (1 - 3)  dextrose Oral Gel 15 Gram(s) Oral once PRN Blood Glucose LESS THAN 70 milliGRAM(s)/deciliter  dextrose Oral Gel 15 Gram(s) Oral once PRN Blood Glucose LESS THAN 70 milliGRAM(s)/deciliter  melatonin 3 milliGRAM(s) Oral at bedtime PRN Insomnia  ondansetron Injectable 4 milliGRAM(s) IV Push every 6 hours PRN Nausea and/or Vomiting  sodium chloride 0.9% lock flush 10 milliLiter(s) IV Push every 1 hour PRN Pre/post blood products, medications, blood draw, and to maintain line patency    Weight:  Daily     Daily     Weight Change:     Nutrition Focused Physical Exam: Completed [   ]  Not Pertinent [   ]  Muscle Wasting- Temporal [   ]  Clavicle/Pectoral [   ]  Shoulder/Deltoid [   ]  Scapula [   ]  Interosseous [   ]  Quadriceps [   ]  Gastrocnemius [   ]  Fat Wasting- Orbital [   ]  Buccal [   ]  Triceps [   ]  Rib [   ]  Suspect [PCM] 2/2 to physical assessment, [poor intake], and [wt loss]; please see malnutrition chart note.    Estimated energy needs:     Subjective:     Previous Nutrition Diagnosis:    Active [   ]  Resolved [   ]    If resolved, new PES:     Goal:    Recommendations:    Education:     Risk Level: High [   ] Moderate [   ] Low [   ] Admitting Diagnosis:   Patient is a 78y old  Male who presents with a chief complaint of A flutter (10 Feb 2023 13:09)  PAST MEDICAL & SURGICAL HISTORY:  Current Nutrition Order:  Diet, NPO (02-10-23 @ 13:23)    PO Intake: Good (%) [   ]  Fair (50-75%) [   ] Poor (<25%) [   ] * NPO with TPN [ x ]     GI Issues:   No noted N/V reported per pt's RN  Ileostomy (3475mL in the past 24h)  No abdominal discomfort or distention noted    Pain:   denies pain/discomfort per chart, medical pain management (pain regimen) in place    Skin Integrity:  Surgical incision noted  Buddy: 12  No edema noted  pressure ulcer: sacral spine stage IV pressure ulcer, unstageable upper back pressure ulcer  *pt has been ordered MVI, vitamin C and zinc sulfate*     Labs:   02-10    135  |  99  |  19  ----------------------------<  123<H>  4.6   |  22  |  0.70    Ca    9.5      10 Feb 2023 13:26  Phos  3.2     02-10  Mg     1.9     02-10    TPro  7.9  /  Alb  2.4<L>  /  TBili  0.7  /  DBili  x   /  AST  33  /  ALT  42  /  AlkPhos  222<H>  10    CAPILLARY BLOOD GLUCOSE    POCT Blood Glucose.: 115 mg/dL (10 Feb 2023 13:09)  POCT Blood Glucose.: 141 mg/dL (10 Feb 2023 11:42)  POCT Blood Glucose.: 150 mg/dL (2023 16:14)    Medications:  MEDICATIONS  (STANDING):  aMIOdarone    Tablet 100 milliGRAM(s) Oral every 24 hours  ascorbic acid 500 milliGRAM(s) Oral daily  chlorhexidine 2% Cloths 1 Application(s) Topical <User Schedule>  cholestyramine Powder (Sugar-Free) 2 Gram(s) Oral three times a day  dextrose 5%. 1000 milliLiter(s) (50 mL/Hr) IV Continuous <Continuous>  dextrose 5%. 1000 milliLiter(s) (100 mL/Hr) IV Continuous <Continuous>  dextrose 5%. 1000 milliLiter(s) (50 mL/Hr) IV Continuous <Continuous>  dextrose 5%. 1000 milliLiter(s) (100 mL/Hr) IV Continuous <Continuous>  dextrose 50% Injectable 25 Gram(s) IV Push once  dextrose 50% Injectable 12.5 Gram(s) IV Push once  dextrose 50% Injectable 25 Gram(s) IV Push once  diphenoxylate/atropine 2 Tablet(s) Oral every 8 hours  enoxaparin Injectable 60 milliGRAM(s) SubCutaneous every 12 hours  fat emulsion (Fish Oil and Plant Based) 20% Infusion 0.7764 Gm/kG/Day (20.8 mL/Hr) IV Continuous <Continuous>  glucagon  Injectable 1 milliGRAM(s) IntraMuscular once  glucagon  Injectable 1 milliGRAM(s) IntraMuscular once  influenza  Vaccine (HIGH DOSE) 0.7 milliLiter(s) IntraMuscular once  insulin lispro (ADMELOG) corrective regimen sliding scale   SubCutaneous every 6 hours  lactated ringers Bolus 500 milliLiter(s) IV Bolus once  loperamide 4 milliGRAM(s) Oral every 8 hours  metoprolol succinate ER 25 milliGRAM(s) Oral daily  mirtazapine 30 milliGRAM(s) Oral at bedtime  multivitamin 1 Tablet(s) Oral daily  opium Tincture 6 milliGRAM(s) Oral every 12 hours  pantoprazole    Tablet 40 milliGRAM(s) Oral two times a day  Parenteral Nutrition - Adult 1 Each (83 mL/Hr) TPN Continuous <Continuous>  Parenteral Nutrition - Adult 1 Each (83 mL/Hr) TPN Continuous <Continuous>  piperacillin/tazobactam IVPB.. 4.5 Gram(s) IV Intermittent every 8 hours  psyllium Powder 1 Packet(s) Oral every 8 hours  sacubitril 24 mG/valsartan 26 mG 1 Tablet(s) Oral two times a day  spironolactone 25 milliGRAM(s) Oral daily  vancomycin  IVPB 1250 milliGRAM(s) IV Intermittent every 24 hours  zinc sulfate 220 milliGRAM(s) Oral daily    MEDICATIONS  (PRN):  acetaminophen     Tablet .. 650 milliGRAM(s) Oral every 6 hours PRN Temp greater or equal to 38C (100.4F), Mild Pain (1 - 3)  dextrose Oral Gel 15 Gram(s) Oral once PRN Blood Glucose LESS THAN 70 milliGRAM(s)/deciliter  dextrose Oral Gel 15 Gram(s) Oral once PRN Blood Glucose LESS THAN 70 milliGRAM(s)/deciliter  melatonin 3 milliGRAM(s) Oral at bedtime PRN Insomnia  ondansetron Injectable 4 milliGRAM(s) IV Push every 6 hours PRN Nausea and/or Vomiting  sodium chloride 0.9% lock flush 10 milliLiter(s) IV Push every 1 hour PRN Pre/post blood products, medications, blood draw, and to maintain line patency    Admitting Anthropometrics:    pounds +-10%  Wt 142 pounds BMI 16.4 %IBW=66%     Weight Change:    141.9 pounds - dosing wt    136 pounds - Bedscale wt     **PCM:  >> Reports having 1-2 meals/day + ONS. Per pt claims to have 2 ensure/day and 2 nutrament/day - unclear how accurate this is. ?If pt meeting ~75% EER consistently.  >> Does report wt loss.  pounds x6 months ago. Thinks he now is 135 pounds which is consistent with bedscale wt obtained during initial visit by  pounds. Suggest wt loss of 49 pounds/26% body wt loss.  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     Estimated energy needs:  Current body wt for EER based on clinician judgment. Adjust for age, malnutrition, EF, S/p OR, Pressure ulcer; fluids per team  25-30kcal/k-1950kcal/day   1.3-1.5gm/k-98gm prot/day   30-35kcal/k-2275kcal/day   **Rec upper end of needs     Subjective:  77M with PMHx of IVDU found unresponsive at his nursing home, resolved after Narcan in the field and brought to [facility]. Found to have PE, atrial flutter, and large LV thrombus on echo. Transferred to [facility] for further management. Pt c/o abdominal pain on 12/10 CTA showing mid SMA with embolus. Abnormal distal small bowel loops and cecum with dilatation and pneumatosis suggesting infarcted bowel. One or two tiny foci of intrahepatic portal vein pneumatosis. Segmental infarction upper pole left kidney. Now s/p Ex lap, SMA embolectomy, 80cm SBR, abthera vac left in discontinuity () and transferred to SICU intubated. S/p second look () and most recently s/p OR for 3rd look, end-to-end anastomosis of remaining bowel, loop ileostomy and abdomen closure (12/15). Transferred to CCU on  for cardiac optimization and now transferred back to SICU  for bleeding hemodynamic instability. Echo  shows EF 10-15% with overall hypokinesis. CTA performed demonstrating large hematoma in R abdomen, new hemoperitoneum, and bilateral pleural effusions. Remains in SICU, now extubated with still with limb ischemia to LLE pending amputation and AC held given BRBPR and hematoma; noted pt agreeable for AKA when feasible - AKA currently Pending Cards. Pt s/p DCCV on  (negative LORENZO on ) with successful conversion to NSR. Planning for AKA with vascular surgery once nutrition status is optimize. Team placed PICC 1/10 and initiated supplemental TPN now weaned off with constant encouragement of PO intake. Per CM note. medically ready for discharge with acceptance to [facility]. Plan to place central line when feasible and restart TPN- please see recs below. Disc with team. Possible OR next week for ileostomy reversal.  O/N: formed BM. 2/10 O/N: interested in palliative care.     On assessment, pt resting in bed. Prior to recent transport to SICU, pt had been on a regular diet with Ensure Enlive TID (1050 kcal, 60g protein, 540mL free H2O) with TPN: 275g Dex, 90g AA, 50g lipids (1795kcal, 90g amino acids). Pt has been ordered NPO status since 2/10/23 at 13:23. RD discussed pt's status with pt's RN, as pt was resting in bed when RD visited pt's room (MAP at 98 at time of assessment, now MAP 64, MAP Trending >65). Pt's RN stated pt has had ~50% or less PO intakes overall, pt's appetite appears to remain poor meeting </=50% estimated needs. Pt's RN endorsed pt consumed 1 Ensure ONS this morning, no other PO intakes. Pt's family brings in food from home- Cont to have family provide pt with his own food preferences as pt dislikes most foods on menu provided. Labs reviewed: elevated serum Glucose (123), POCT BG (115), ALP (222), blood lactate (4.1); RD to continue to monitor trends. Pt not on any gtt currently. Pt receiving IVF (LR bolus) currently. RD cont to encourage adequate PO intake as tolerated. RD to follow.     Prior PES: Malnutrition Severe in Chronic state RT presumed intake<EER AEB NFPE, wt loss, PO intake  >>on going  Goal: Pt will meet at least 75% of nutrient needs via feasible route / Show no further s/s of malnutrition    Recommendations:  1. Cont with NPO status and advance diet as medically feasible/tolerated >>CLD >>FLD >> Regular diet with Ensure Enlive ONS TID (providing 1050kcal, 60gPRO, 540mL free water)  2. Due to persistent malabsorption, If team able to continue TPN, would recommend meeting 100% of est needs until outpt via ostomy improves. Recommend; 275g Dex, 90g AA, 50g SMOF lipids to provide 1795 kcal, 90g pro, GIR 2.93, 1.38 g/kg pro ABW. RD to follow.  >> Cont to trend TG weekly  3. Recommend continue MVI daily  4. Monitor Skin, Wts daily, GOC. Pain/GI per team.   >>Cont with imodium and metamucil per team  5. Labs: monitor BMP, CBC, glucose, lytes - Replete PRN, trend renal indices, LFts, POCT.  6. RD to remain available for additional Recs.    **RD paged medical team    Education:  Deferred at time of assessment; RD to continue to encourage adequate PO intake to prevent further s/sx of malnutrition/ healthy weight gain    Risk Level: High [ X ] Moderate [   ] Low [   ]. Admitting Diagnosis:   Patient is a 78y old  Male who presents with a chief complaint of A flutter (10 Feb 2023 13:09)  PAST MEDICAL & SURGICAL HISTORY:  Current Nutrition Order:  Diet, NPO (02-10-23 @ 13:23)    PO Intake: Good (%) [   ]  Fair (50-75%) [   ] Poor (<25%) [   ] * NPO with TPN [ x ]     GI Issues:   No noted N/V reported per pt's RN  Ileostomy (3475mL in the past 24h)  No abdominal discomfort or distention noted    Pain:   denies pain/discomfort per chart, medical pain management (pain regimen) in place    Skin Integrity:  Surgical incision noted  Buddy: 12  No edema noted  pressure ulcer: sacral spine stage IV pressure ulcer, unstageable upper back pressure ulcer  *pt has been ordered MVI, vitamin C and zinc sulfate*     Labs:   02-10    135  |  99  |  19  ----------------------------<  123<H>  4.6   |  22  |  0.70    Ca    9.5      10 Feb 2023 13:26  Phos  3.2     02-10  Mg     1.9     02-10    TPro  7.9  /  Alb  2.4<L>  /  TBili  0.7  /  DBili  x   /  AST  33  /  ALT  42  /  AlkPhos  222<H>  10    CAPILLARY BLOOD GLUCOSE    POCT Blood Glucose.: 115 mg/dL (10 Feb 2023 13:09)  POCT Blood Glucose.: 141 mg/dL (10 Feb 2023 11:42)  POCT Blood Glucose.: 150 mg/dL (2023 16:14)    Medications:  MEDICATIONS  (STANDING):  aMIOdarone    Tablet 100 milliGRAM(s) Oral every 24 hours  ascorbic acid 500 milliGRAM(s) Oral daily  chlorhexidine 2% Cloths 1 Application(s) Topical <User Schedule>  cholestyramine Powder (Sugar-Free) 2 Gram(s) Oral three times a day  dextrose 5%. 1000 milliLiter(s) (50 mL/Hr) IV Continuous <Continuous>  dextrose 5%. 1000 milliLiter(s) (100 mL/Hr) IV Continuous <Continuous>  dextrose 5%. 1000 milliLiter(s) (50 mL/Hr) IV Continuous <Continuous>  dextrose 5%. 1000 milliLiter(s) (100 mL/Hr) IV Continuous <Continuous>  dextrose 50% Injectable 25 Gram(s) IV Push once  dextrose 50% Injectable 12.5 Gram(s) IV Push once  dextrose 50% Injectable 25 Gram(s) IV Push once  diphenoxylate/atropine 2 Tablet(s) Oral every 8 hours  enoxaparin Injectable 60 milliGRAM(s) SubCutaneous every 12 hours  fat emulsion (Fish Oil and Plant Based) 20% Infusion 0.7764 Gm/kG/Day (20.8 mL/Hr) IV Continuous <Continuous>  glucagon  Injectable 1 milliGRAM(s) IntraMuscular once  glucagon  Injectable 1 milliGRAM(s) IntraMuscular once  influenza  Vaccine (HIGH DOSE) 0.7 milliLiter(s) IntraMuscular once  insulin lispro (ADMELOG) corrective regimen sliding scale   SubCutaneous every 6 hours  lactated ringers Bolus 500 milliLiter(s) IV Bolus once  loperamide 4 milliGRAM(s) Oral every 8 hours  metoprolol succinate ER 25 milliGRAM(s) Oral daily  mirtazapine 30 milliGRAM(s) Oral at bedtime  multivitamin 1 Tablet(s) Oral daily  opium Tincture 6 milliGRAM(s) Oral every 12 hours  pantoprazole    Tablet 40 milliGRAM(s) Oral two times a day  Parenteral Nutrition - Adult 1 Each (83 mL/Hr) TPN Continuous <Continuous>  Parenteral Nutrition - Adult 1 Each (83 mL/Hr) TPN Continuous <Continuous>  piperacillin/tazobactam IVPB.. 4.5 Gram(s) IV Intermittent every 8 hours  psyllium Powder 1 Packet(s) Oral every 8 hours  sacubitril 24 mG/valsartan 26 mG 1 Tablet(s) Oral two times a day  spironolactone 25 milliGRAM(s) Oral daily  vancomycin  IVPB 1250 milliGRAM(s) IV Intermittent every 24 hours  zinc sulfate 220 milliGRAM(s) Oral daily    MEDICATIONS  (PRN):  acetaminophen     Tablet .. 650 milliGRAM(s) Oral every 6 hours PRN Temp greater or equal to 38C (100.4F), Mild Pain (1 - 3)  dextrose Oral Gel 15 Gram(s) Oral once PRN Blood Glucose LESS THAN 70 milliGRAM(s)/deciliter  dextrose Oral Gel 15 Gram(s) Oral once PRN Blood Glucose LESS THAN 70 milliGRAM(s)/deciliter  melatonin 3 milliGRAM(s) Oral at bedtime PRN Insomnia  ondansetron Injectable 4 milliGRAM(s) IV Push every 6 hours PRN Nausea and/or Vomiting  sodium chloride 0.9% lock flush 10 milliLiter(s) IV Push every 1 hour PRN Pre/post blood products, medications, blood draw, and to maintain line patency    Admitting Anthropometrics:    pounds +-10%  Wt 142 pounds BMI 16.4 %IBW=66%     Weight Change:    141.9 pounds - dosing wt    136 pounds - Bedscale wt     **PCM:  >> Reports having 1-2 meals/day + ONS. Per pt claims to have 2 ensure/day and 2 nutrament/day - unclear how accurate this is. ?If pt meeting ~75% EER consistently.  >> Does report wt loss.  pounds x6 months ago. Thinks he now is 135 pounds which is consistent with bedscale wt obtained during initial visit by  pounds. Suggest wt loss of 49 pounds/26% body wt loss.  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     Estimated energy needs:  Current body wt for EER based on clinician judgment. Adjust for age, malnutrition, EF, S/p OR, Pressure ulcer; fluids per team  25-30kcal/k-1950kcal/day   1.3-1.5gm/k-98gm prot/day   30-35kcal/k-2275kcal/day   **Rec upper end of needs     Subjective:  77M with PMHx of IVDU found unresponsive at his nursing home, resolved after Narcan in the field and brought to [facility]. Found to have PE, atrial flutter, and large LV thrombus on echo. Transferred to [facility] for further management. Pt c/o abdominal pain on 12/10 CTA showing mid SMA with embolus. Abnormal distal small bowel loops and cecum with dilatation and pneumatosis suggesting infarcted bowel. One or two tiny foci of intrahepatic portal vein pneumatosis. Segmental infarction upper pole left kidney. Now s/p Ex lap, SMA embolectomy, 80cm SBR, abthera vac left in discontinuity () and transferred to SICU intubated. S/p second look () and most recently s/p OR for 3rd look, end-to-end anastomosis of remaining bowel, loop ileostomy and abdomen closure (12/15). Transferred to CCU on  for cardiac optimization and now transferred back to SICU  for bleeding hemodynamic instability. Echo  shows EF 10-15% with overall hypokinesis. CTA performed demonstrating large hematoma in R abdomen, new hemoperitoneum, and bilateral pleural effusions. Remains in SICU, now extubated with still with limb ischemia to LLE pending amputation and AC held given BRBPR and hematoma; noted pt agreeable for AKA when feasible - AKA currently Pending Cards. Pt s/p DCCV on  (negative LORENZO on ) with successful conversion to NSR. Planning for AKA with vascular surgery once nutrition status is optimize. Team placed PICC 1/10 and initiated supplemental TPN now weaned off with constant encouragement of PO intake. Per CM note. medically ready for discharge with acceptance to [facility]. Plan to place central line when feasible and restart TPN- please see recs below. Disc with team. Possible OR next week for ileostomy reversal.  O/N: formed BM. 2/10 O/N: interested in palliative care.     On assessment, pt resting in bed. Prior to recent transport to SICU, pt had been on a regular diet with Ensure Enlive TID (1050 kcal, 60g protein, 540mL free H2O) with TPN: 275g Dex, 90g AA, 50g lipids (1795kcal, 90g amino acids). Pt has been ordered NPO status since 2/10/23 at 13:23. RD discussed pt's status with pt's RN, as pt was resting in bed when RD visited pt's room (MAP at 98 at time of assessment, now MAP 64, MAP Trending >65). Pt's RN stated pt has had ~50% or less PO intakes overall, pt's appetite appears to remain poor meeting </=50% estimated needs. Pt's RN endorsed pt consumed 1 Ensure ONS this morning, no other PO intakes. Pt's family brings in food from home- Cont to have family provide pt with his own food preferences as pt dislikes most foods on menu provided. Labs reviewed: elevated serum Glucose (123), POCT BG (115), ALP (222), blood lactate (4.1); RD to continue to monitor trends. Pt not on any gtt currently. Pt receiving IVF (LR bolus) currently. RD cont to encourage adequate PO intake as tolerated. RD discussed nutrition plan with medical team (NPO with TPN is to continue). RD to follow.     Prior PES: Malnutrition Severe in Chronic state RT presumed intake<EER AEB NFPE, wt loss, PO intake  >>on going  Goal: Pt will meet at least 75% of nutrient needs via feasible route / Show no further s/s of malnutrition    Recommendations:  1. Cont with NPO status and advance diet as medically feasible/tolerated >>CLD >>FLD >> Regular diet with Ensure Enlive ONS TID (providing 1050kcal, 60gPRO, 540mL free water)  2. Due to persistent malabsorption, If team able to continue TPN, would recommend meeting 100% of est needs until outpt via ostomy improves. Recommend; 275g Dex, 90g AA, 50g SMOF lipids to provide 1795 kcal, 90g pro, GIR 2.93, 1.38 g/kg pro ABW. RD to follow.  >> Cont to trend TG weekly  3. Recommend continue MVI daily  4. Monitor Skin, Wts daily, GOC. Pain/GI per team.   >>Cont with imodium and metamucil per team  5. Labs: monitor BMP, CBC, glucose, lytes - Replete PRN, trend renal indices, LFts, POCT.  6. RD to remain available for additional Recs.    **RD discussed with medical team     Education:  Deferred at time of assessment; RD to continue to encourage adequate PO intake to prevent further s/sx of malnutrition/ healthy weight gain    Risk Level: High [ X ] Moderate [   ] Low [   ].

## 2023-02-11 NOTE — PROGRESS NOTE ADULT - PROVIDER SPECIALTY LIST ADULT
Surgery Complex Repair And Rhombic Flap Text: The defect edges were debeveled with a #15 scalpel blade.  The primary defect was closed partially with a complex linear closure.  Given the location of the remaining defect, shape of the defect and the proximity to free margins a rhombic flap was deemed most appropriate for complete closure of the defect.  Using a sterile surgical marker, an appropriate advancement flap was drawn incorporating the defect and placing the expected incisions within the relaxed skin tension lines where possible.    The area thus outlined was incised deep to adipose tissue with a #15 scalpel blade.  The skin margins were undermined to an appropriate distance in all directions utilizing iris scissors.

## 2023-02-11 NOTE — PROGRESS NOTE ADULT - ATTENDING COMMENTS
Acute events - patient yesterday became febrile, tachycardic, hypotensive requiring transfer to SICU for concern of sepsis  Workup revealing bacteremia with Klebsiella and VRE bacteremia  With IVF resuscitation and broadening of antibiotics, improvement in BP and HR    Acute mesenteric ischemia - s/p ex-lap, SMA embolectomy, SBR, anastomosis, diverting ostomy, now monitoring intraabdominal collection not amenable to IR drainage  High ostomy output - continues despite anti-motility agents, CT obtained to evaluate patency of anastomosis, no evidence of leak, will plan for reversal of ostomy when optimized nutritionally  Malnutrition - continue diet, vargas counts, ensure TID, PPN discontinued given bacteremia  A Flutter - s/p cardioversion, anticoagulation course for cardioversion to complete 1/28, will reengage cards to rule out underlying cardiogenic/arrhythmia contributing to clinical picture  LV thrombus - long term anticoagulation  LLE ischemia - AKA outpatient per vascular surgery    Late entry, date of service 2/11/23 Yes

## 2023-02-11 NOTE — PROGRESS NOTE ADULT - SUBJECTIVE AND OBJECTIVE BOX
Interval Events: T. Max 102.7 overnight. Blood Cultures positive for K. Pneumoniae VRE. Vnaco d/c'ed Dapto started. Additional 750cc bolus given overnight. CT C/A/P performed.      Subjective Events: Patient seen and examined at bedside. Reports generalized discomfort. Denies shortness of breath, chest pain, palpitations, abdominal pain. ROS negative.       Allergies  No Known Allergies    Vital Signs Last 24 Hrs  T(C): 37.9 (11 Feb 2023 10:00), Max: 40 (10 Feb 2023 23:00)  T(F): 100.3 (11 Feb 2023 10:00), Max: 104 (10 Feb 2023 23:00)  HR: 103 (11 Feb 2023 12:00) (95 - 168)  BP: 97/55 (11 Feb 2023 12:00) (70/48 - 128/71)  BP(mean): 69 (11 Feb 2023 12:00) (55 - 94)  RR: 17 (11 Feb 2023 12:00) (10 - 35)  SpO2: 98% (11 Feb 2023 12:00) (88% - 100%)    Parameters below as of 11 Feb 2023 11:00  Patient On (Oxygen Delivery Method): room air        02-10 @ 07:01 - 02-11 @ 07:00  --------------------------------------------------------  IN: 5145.4 mL / OUT: 3445 mL / NET: 1700.4 mL    02-11 @ 07:01 - 02-11 @ 12:43  --------------------------------------------------------  IN: 317.6 mL / OUT: 250 mL / NET: 67.6 mL      02-10 @ 07:01 - 02-11 @ 07:00  --------------------------------------------------------  IN: 5145.4 mL / OUT: 3445 mL / NET: 1700.4 mL    02-11 @ 07:01  -  02-11 @ 12:43  --------------------------------------------------------  IN: 317.6 mL / OUT: 250 mL / NET: 67.6 mL        Physical Exam:   Gen: No acute distress   Neuro: A&OX3 No deficits  CV: Tachycardic, regular rhythm, s1,s2, no peripheral edema   Pulm: Lung sounds clear bilaterally   Abd: Soft NT ND + BS  Ext: Warm  Vasc: Left lower extremity with dry gangrene from toes to below the knee, wrapped with curlex. Right pedal, bilateral radial pulses +1  Skin: no rashes noted  MSK: No joint swelling  Psych: No signs of anxiety or depression      LABS:  CBC Full  -  ( 11 Feb 2023 06:20 )  WBC Count : 24.35 K/uL  RBC Count : 2.62 M/uL  Hemoglobin : 7.4 g/dL  Hematocrit : 22.5 %  Platelet Count - Automated : 533 K/uL  Mean Cell Volume : 85.9 fl  Mean Cell Hemoglobin : 28.2 pg  Mean Cell Hemoglobin Concentration : 32.9 gm/dL  02-11    133<L>  |  96  |  26<H>  ----------------------------<  129<H>  3.7   |  28  |  0.70    Ca    8.6      11 Feb 2023 05:30  Phos  3.0     02-11  Mg     1.8     02-11         PTT - ( 10 Feb 2023 13:26 )  PTT:26.7 sec      Urinalysis Basic - ( 11 Feb 2023 07:49 )    Color: Yellow / Appearance: Clear / SG: <=1.005 / pH: x  Gluc: x / Ketone: NEGATIVE  / Bili: Negative / Urobili: 0.2 E.U./dL   Blood: x / Protein: Trace mg/dL / Nitrite: NEGATIVE   Leuk Esterase: NEGATIVE / RBC: < 5 /HPF / WBC 5-10 /HPF   Sq Epi: x / Non Sq Epi: 0-5 /HPF / Bacteria: None /HPF        RADIOLOGY & ADDITIONAL STUDIES (The following images were personally reviewed):    CT ABDOMEN AND PELVIS IC       PROCEDURE DATE:  02/11/2023          INTERPRETATION:  Initial report created on 2/11/2023 4:30:44 AM EST  PROCEDURE INFORMATION:  Exam: CT Chest With Contrast; Diagnostic  Exam date and time: 2/11/2023 1:20 AM  Exam: CT Abdomen And Pelvis With Contrast  Exam date and time: 2/11/2023 1:20 AM  Age: 78 years old  Clinical indication: Fever    TECHNIQUE:  Imaging protocol: Diagnostic computed tomography of the chest, abdomen   and pelvis with contrast.  3D rendering (Not supervised by radiologist): MIP and/or 3D reconstructed  images were created by the technologist.    COMPARISON:  CT ABDOMEN AND PELVIS WITH ORAL CONTRAST WITH IV CONTRAST 1/26/2023 12:45   PM    FINDINGS:  Lungs: Sections through the lungs demonstrate mild dependent atelectasis  particularly on the right side but no areas of consolidation, pleural   effusion,  or pneumothorax. There is background emphysema.  Pleural spaces: See above finding.  Heart: The heart is not enlarged and there is no pericardial effusion.  Lymph nodes: There is no evidence of mediastinal or hilar adenopathy.  Vasculature: There is no evidence of pulmonary embolism. No evidence of  thoracic aortic dissection or aneurysm.  Bones/joints: Unremarkable. No acute fracture.  Soft tissues: Unremarkable.    Liver: There is nonspecific hepatomegaly as before. Artifact obscures   portions  of the upper and mid liver no conclusions can be drawn.  Gallbladder and bile ducts: The gallbladder appears contracted which may  account for thickened wall which could be further evaluated with right   upper  quadrant sonography. No definite biliary dilatation.  Pancreas: Pancreatic duct distension as on prior study pancreas otherwise  stable.  Spleen: Spleen unremarkable.  Adrenal glands: Adrenal glands unremarkable.  Kidneys and ureters: Likely cysts both kidneys without hydronephrosis.    Stomach and bowel: As on a prior study of 01/26/2023, there is a mildly  thick-walled fluid collection in the right lower quadrant measuring 7.2   cm in  transverse dimension by 4.0 cm in AP dimension by approximately 8 cm in  craniocaudad dimension which overall may be slightly smaller compared to   the  prior study. Wall thickening and inflammation of the cecum again noted.   There is a large  amount of associated mural edema mucosal hyperenhancement and induration  throughout the mesentery throughout the abdomen particularly the right   lower  quadrant. Patient with loop ileostomy.  Appendix:  Motion artifact. Any clinical evidence of prior appendectomy?.    Intraperitoneal space: Small volume ascites continues. There is no   evidence of pneumoperitoneum. There is no  definite bowel obstruction.  Vasculature: Marked ectasia abdominal aorta and iliac arteries with   advanced  atherosclerotic changes without significant aneurysm.  Lymph nodes:  Difficult to assess due to motion artifact.    Urinary bladder: Marked enlargement heterogeneity of prostate gland   indents the undersurface of the  mildly thick-walled urinary bladder as before.  Reproductive: As above.    Bones/joints: Advanced degenerative changes both hips and lumbar spine.  Soft tissues: Surrounding anasarca in the soft tissue of the abdomen and  pelvis.    IMPRESSION:  Chest:  1. Sections through the lungs demonstrate mild dependent atelectasis  particularly on the right side but no areas of consolidation, pleural   effusion,  or pneumothorax.  2. There is no evidence of pulmonary embolism.  3. There is no evidence of mediastinal or hilar adenopathy.  4. No evidence of thoracic aortic dissection or aneurysm.  5. The heart is not enlarged and there is no pericardial effusion.    Ab/Pel:  1. As on a prior study of 01/26/2023, there is a mildly thick-walled fluid  collection in the right lower quadrant measuring 7.2 cm in transverse   dimension  by 4.0 cm in AP dimension by approximately 8 cm in craniocaudad dimension   which  overall may be slightly smaller compared to the prior study. Persistent   colitis of the cecum and ascending colon. Small volume ascites continues.  Patient with loop ileostomy as before. There is no evidence of   pneumoperitoneum. There is no definite bowel  obstruction.  2. Other incidental findings as above.    Addendum created by Dr. Ruperto Azar MD on 2/11/2023 5:09:02 AM EST   for and as acknowledged receipt of this report and has no questions   at 4:05  a.m. central standard time on 02/11/2023.

## 2023-02-11 NOTE — PROGRESS NOTE ADULT - ASSESSMENT
77y y/o Male with significant PMHx of IVDU found unresponsive at his nursing home, resolved after Narcan in the field, and brought to [facility]. Found to have PE, atrial flutter, and large LV thrombus on echo. Transferred to [facility] for further management. Pt C/o abdominal pain on 12/10 CTA showing mid SMA with embolus. Abnormal distal small bowel loops and cecum with dilatation and pneumatosis suggesting infarcted bowel. One or two tiny foci of  intrahepatic portal vein pneumatosis. Segmental infarction upper pole left kidney. Now s/p Ex lap, SMA embolectomy, 80cm SBR, abthera vac left in discontinuity (12/11) and transferred to SICU intubated. S/p second look (12/13) and most recently s/p OR for 3rd look, end-to-end anastomosis of remaining bowel, loop ileostomy and abdomen closure (12/15).  LLE Ischemia has not yet fully demarcated, it is clear that the posterior calf (which is needed to heal a BKA flap) is involved and therefore only surgical option available to the patient is an AKA pending medical optimization. LLE gangrene dry at this time. Pt with unclear source of infection at this time, lactate resolved and tachycardia much improved s/p fluid resuscitation.    - LLE with dry gangrene extending from toes to just below the knee, will continue to monitor  - No vascular surgery intervention at this time  - Patient can follow up with Dr. Greenberg when nutritional status is optimized for elective amputation  - Continue daily local wound care with betadine to all skin below the line of demarcation of his left shin and Kerlix. No offloading boot, keep left heel elevated off bed.   - Rest of care per primary team

## 2023-02-11 NOTE — PROGRESS NOTE ADULT - ASSESSMENT
77M with PMHx of IVDU found unresponsive at his nursing home, resolved after Narcan in the field and brought to [facility]. Found to have PE, atrial flutter, and large LV thrombus on echo. Transferred to [facility] for further management. Pt c/o abdominal pain on 12/10 CTA showing mid SMA with embolus. Abnormal distal small bowel loops and cecum with dilatation and pneumatosis suggesting infarcted bowel. One or two tiny foci of intrahepatic portal vein pneumatosis. Segmental infarction upper pole left kidney. Now s/p Ex lap, SMA embolectomy, 80cm SBR, abthera vac left in discontinuity (12/11) and transferred to SICU intubated. S/p second look (12/13) and most recently s/p OR for 3rd look, end-to-end anastomosis of remaining bowel, loop ileostomy and abdomen closure (12/15). Now stepped down to telemtry. LLE ischemia, AKA delayed by nutritional optimization. Transferred to SICU in the setting of sepsis for HD monitoring.       Plan:   Neuro: No active issues   CV: Hypotension 2/2 sepsis/hypovolemia. IV abx started. Hypovolemic: Bolus as needed. Maintain MAP >65. A.Fib/Flutter: s/p cardioversion. Continue metoprolol/amiodarone   Pulm: Sating well on nasal canula   GI/FEN: High Ostomy output. Replete. Continue Immodium/Tincture of Opium. PPN discontinued in the setting of bacteremia. D10 started. Monitor BG   : Cath in place. Strict I and O.   Endo: mISS  Heme: Anemia H/H 7.4/22.5 1 unit PRBCs per surgery. LV thrombis w/ LLE ischemia. SQL on hold for possible procedure Active T&S;   ID: Sepsis 2/2 K. Pneumoniae/Enterococcus VRE. Luis (2/10--) (Dapto 2/11--)   dc//:: Zosyn. Vanc (2/10--2/11).  PPX: SCDs, CT of the abdomen showing persistent fluid collection in the RLQ. Awaiting further surg rec;s. ID consulted.  L/D/T: PIV  PT/OT: Not ordered  Dispo: SICU

## 2023-02-11 NOTE — PROGRESS NOTE ADULT - SUBJECTIVE AND OBJECTIVE BOX
INFECTIOUS DISEASES CONSULT FOLLOW-UP NOTE    INTERVAL HPI/OVERNIGHT EVENTS:  febrile to 102.3  patient lethergic , unable to answer questions      ROS:   unable to obtain       ANTIBIOTICS/RELEVANT:    MEDICATIONS  (STANDING):  aMIOdarone    Tablet 100 milliGRAM(s) Oral every 24 hours  ascorbic acid 500 milliGRAM(s) Oral daily  chlorhexidine 2% Cloths 1 Application(s) Topical <User Schedule>  cholestyramine Powder (Sugar-Free) 2 Gram(s) Oral three times a day  DAPTOmycin IVPB      dextrose 10%. 1000 milliLiter(s) (30 mL/Hr) IV Continuous <Continuous>  dextrose 5%. 1000 milliLiter(s) (100 mL/Hr) IV Continuous <Continuous>  dextrose 5%. 1000 milliLiter(s) (50 mL/Hr) IV Continuous <Continuous>  dextrose 5%. 1000 milliLiter(s) (50 mL/Hr) IV Continuous <Continuous>  dextrose 5%. 1000 milliLiter(s) (100 mL/Hr) IV Continuous <Continuous>  dextrose 50% Injectable 25 Gram(s) IV Push once  dextrose 50% Injectable 12.5 Gram(s) IV Push once  dextrose 50% Injectable 25 Gram(s) IV Push once  diphenoxylate/atropine 2 Tablet(s) Oral every 8 hours  glucagon  Injectable 1 milliGRAM(s) IntraMuscular once  glucagon  Injectable 1 milliGRAM(s) IntraMuscular once  influenza  Vaccine (HIGH DOSE) 0.7 milliLiter(s) IntraMuscular once  insulin lispro (ADMELOG) corrective regimen sliding scale   SubCutaneous every 6 hours  loperamide 4 milliGRAM(s) Oral every 8 hours  meropenem  IVPB 1000 milliGRAM(s) IV Intermittent every 8 hours  metoprolol succinate ER 25 milliGRAM(s) Oral daily  mirtazapine 30 milliGRAM(s) Oral at bedtime  multivitamin 1 Tablet(s) Oral daily  opium Tincture 6 milliGRAM(s) Oral every 12 hours  pantoprazole    Tablet 40 milliGRAM(s) Oral two times a day  psyllium Powder 1 Packet(s) Oral every 8 hours  sacubitril 24 mG/valsartan 26 mG 1 Tablet(s) Oral two times a day  spironolactone 25 milliGRAM(s) Oral daily  zinc sulfate 220 milliGRAM(s) Oral daily    MEDICATIONS  (PRN):  acetaminophen     Tablet .. 650 milliGRAM(s) Oral every 6 hours PRN Temp greater or equal to 38C (100.4F), Mild Pain (1 - 3)  dextrose Oral Gel 15 Gram(s) Oral once PRN Blood Glucose LESS THAN 70 milliGRAM(s)/deciliter  dextrose Oral Gel 15 Gram(s) Oral once PRN Blood Glucose LESS THAN 70 milliGRAM(s)/deciliter  melatonin 3 milliGRAM(s) Oral at bedtime PRN Insomnia  ondansetron Injectable 4 milliGRAM(s) IV Push every 6 hours PRN Nausea and/or Vomiting  sodium chloride 0.9% lock flush 10 milliLiter(s) IV Push every 1 hour PRN Pre/post blood products, medications, blood draw, and to maintain line patency        Vital Signs Last 24 Hrs  T(C): 37.9 (11 Feb 2023 10:00), Max: 40 (10 Feb 2023 23:00)  T(F): 100.3 (11 Feb 2023 10:00), Max: 104 (10 Feb 2023 23:00)  HR: 103 (11 Feb 2023 12:00) (95 - 149)  BP: 97/55 (11 Feb 2023 12:00) (70/48 - 118/62)  BP(mean): 69 (11 Feb 2023 12:00) (55 - 94)  RR: 17 (11 Feb 2023 12:00) (10 - 35)  SpO2: 98% (11 Feb 2023 12:00) (88% - 100%)    Parameters below as of 11 Feb 2023 11:00  Patient On (Oxygen Delivery Method): room air        02-10-23 @ 07:01  -  02-11-23 @ 07:00  --------------------------------------------------------  IN: 5145.4 mL / OUT: 3445 mL / NET: 1700.4 mL    02-11-23 @ 07:01  -  02-11-23 @ 13:23  --------------------------------------------------------  IN: 317.6 mL / OUT: 250 mL / NET: 67.6 mL      PHYSICAL EXAM:  Constitutional: lethargic   Eyes: the sclera and conjunctiva were normal.   ENT: the ears and nose were normal in appearance.   Pulmonary: no respiratory distress and lungs were clear to auscultation bilaterally.   Heart: heart rate was normal and rhythm regular, normal S1 and S2  Vascular:. there was no peripheral edema  Abdomen: normal bowel sounds, soft, ileostomy bag  Neurological: AAO x0        LABS:                        7.4    24.35 )-----------( 533      ( 11 Feb 2023 06:20 )             22.5     02-11    133<L>  |  96  |  26<H>  ----------------------------<  129<H>  3.7   |  28  |  0.70    Ca    8.6      11 Feb 2023 05:30  Phos  3.0     02-11  Mg     1.8     02-11    TPro  7.9  /  Alb  2.4<L>  /  TBili  0.7  /  DBili  x   /  AST  33  /  ALT  42  /  AlkPhos  222<H>  02-10    PT/INR - ( 10 Feb 2023 13:26 )   PT: 14.5 sec;   INR: 1.22          PTT - ( 10 Feb 2023 13:26 )  PTT:26.7 sec  Urinalysis Basic - ( 11 Feb 2023 07:49 )    Color: Yellow / Appearance: Clear / SG: <=1.005 / pH: x  Gluc: x / Ketone: NEGATIVE  / Bili: Negative / Urobili: 0.2 E.U./dL   Blood: x / Protein: Trace mg/dL / Nitrite: NEGATIVE   Leuk Esterase: NEGATIVE / RBC: < 5 /HPF / WBC 5-10 /HPF   Sq Epi: x / Non Sq Epi: 0-5 /HPF / Bacteria: None /HPF        MICROBIOLOGY:      RADIOLOGY & ADDITIONAL STUDIES:  Reviewed

## 2023-02-11 NOTE — PROGRESS NOTE ADULT - ASSESSMENT
ASSESSMENT:   77M Hx  cocaine/opiate abuse and resident of a nursing facility presented from [facility] after unresponsiveness alleviated w/ narcan, noted at that time to be in rapid atrial flutter w/ severe LV dysfunction and mobile LV thrombus as well as subsegemental PE and transferred to [facility] for further management. Course complicated by limb ischemia with arterial thrombosis of LLE vessels and subsequently developed abdominal pain and found to have acute mesenteric ischemia s/p emergent bowel resection and SMA thrombectomy. LV thrombus no longer apparent on TTE and has likely embolized. After prolonged open abdomen, septic shock and now s/p ileostomy. His course has been further complicated by hemodynamically significant GI bleeding. Of note patient, underwent LORENZO/DCCV on 12/30 and remains in sinus rhythm. Patient has been awaiting AKA with attempts to nutritionally optimize by primary team, for wound healing. Further complicated by intermittent fevers and significant ostomy output. Cardiology reconsulted for tachycardia, appears sinus.     Cardiac studies:   TTE: LV 5.0 cm, LVEF 10-15% with global hypokinesis, 2.5 cm mobile LV thrombus, severe RV dysfunction  LORENZO: no ISIAH thrombus    PLAN  Tachycardia  Sinus tachycardia  Now febrile with positive BCx  - Sepsis workup/treatment per primary team/ID  - Hold diuretics  - No further cardiac intervention.    Acute Systolic Heart Failure  - Etiology: possible tachycardia induced and now s/p DCCV.. Pending ischemic evaluation as outpatient if LVEF remains reduced pending clinical status  - GDMT: Remains on metoprolol succinate 25mg qd, Entresto 24/26mg BID and spironolactone 25mg qd. Continue to defer SGLT2i   - Diuretics: Dry on exam. No indication for diuretic.  - Device: will need to be further evaluated in future.     Atrial flutter  - s/p DCCV 12/30. Remains in sinus, now with sinus tachycardia. Likely related to fevers and hypovolemia.  - CHADsVASC 3, continue anticoagulation currently on lovenox   - on amiodarone 100mg per EP, remains on Toprol 25     Acute limb ischemia and mesenteric ischemia from LV thrombus  - management per surgery/vascular, currently awaiting AKA as OP   - continue anticoagulation as above

## 2023-02-11 NOTE — PROGRESS NOTE ADULT - ASSESSMENT
77M with PMHx of IVDU found unresponsive at his nursing home, resolved after Narcan in the field and brought to [facility]. Found to have PE, atrial flutter, and large LV thrombus on echo. Transferred to [facility] for further management. Pt c/o abdominal pain on 12/10 CTA showing mid SMA with embolus. Abnormal distal small bowel loops and cecum with dilatation and pneumatosis suggesting infarcted bowel. One or two tiny foci of intrahepatic portal vein pneumatosis. Segmental infarction upper pole left kidney. Now s/p Ex lap, SMA embolectomy, 80cm SBR, abthera vac left in discontinuity (12/11) and transferred to SICU intubated. S/p second look (12/13) and most recently s/p OR for 3rd look, end-to-end anastomosis of remaining bowel, loop ileostomy and abdomen closure (12/15). Now stepped down to telemtry. LLE ischemia, AKA delayed by nutritional optimization. Transferred to SICU in the setting of sepsis for HD monitoring.     Plan:   Neuro: No active issues   CV: Hypotension 2/2 sepsis/hypovolemia. IV abx started. Hypovolemic: Bolus as needed. Maintain MAP >65. A.Fib/Flutter: s/p cardioversion. Continue metoprolol/amiodarone   Pulm: Sating well on nasal canula   GI/FEN: High Ostomy output. Replete. Continue Immodium/Tincture of Opium. PPN discontinued in the setting of bacteremia. D10 started. Monitor BG   : Cath in place. Strict I and O.   Endo: mISS  Heme: Anemia H/H 7.4/22.5 1 unit PRBCs per surgery. LV thrombis w/ LLE ischemia. SQL on hold for possible procedure Active T&S;   ID: Sepsis 2/2 K. Pneumoniae/Enterococcus VRE. Luis (2/10--) (Dapto 2/11--)   dc//:: Zosyn. Vanc (2/10--2/11).  PPX: SCDs, CT of the abdomen showing persistent fluid collection in the RLQ. Awaiting further surg rec;s. ID consulted.  L/D/T: PIV  PT/OT: Not ordered  Dispo: SICU

## 2023-02-11 NOTE — PROGRESS NOTE ADULT - SUBJECTIVE AND OBJECTIVE BOX
Consult for: SONYA dry gangrene - reconsulted for step up to ICU w/new onset fever, tachycardia, rigors, and leukocytosis.     SUBJECTIVE: Patient seen and examined bedside. Today patient continues to be in low spirits. No longer having evidence of     aMIOdarone    Tablet 100 milliGRAM(s) Oral every 24 hours  DAPTOmycin IVPB      meropenem  IVPB 1000 milliGRAM(s) IV Intermittent every 8 hours  sacubitril 24 mG/valsartan 26 mG 1 Tablet(s) Oral two times a day      Vital Signs Last 24 Hrs  T(C): 38.3 (11 Feb 2023 18:05), Max: 40 (10 Feb 2023 23:00)  T(F): 100.9 (11 Feb 2023 18:05), Max: 104 (10 Feb 2023 23:00)  HR: 108 (11 Feb 2023 16:00) (97 - 149)  BP: 103/59 (11 Feb 2023 16:00) (70/48 - 116/78)  BP(mean): 74 (11 Feb 2023 16:00) (55 - 94)  RR: 19 (11 Feb 2023 16:00) (10 - 35)  SpO2: 98% (11 Feb 2023 16:00) (88% - 100%)    Parameters below as of 11 Feb 2023 16:00  Patient On (Oxygen Delivery Method): room air      I&O's Detail    10 Feb 2023 07:01  -  11 Feb 2023 07:00  --------------------------------------------------------  IN:    Fat Emulsion (Fish Oil &amp; Plant Based) 20% Infusion: 270.4 mL    IV PiggyBack: 300 mL    Lactated Ringers Bolus: 1000 mL    Lactated Ringers Bolus: 500 mL    Lactated Ringers Bolus: 250 mL    Lactated Ringers Bolus: 250 mL    Lactated Ringers Bolus: 250 mL    Lactated Ringers Bolus: 250 mL    PPN (Peripheral Parenteral Nutrition): 2075 mL  Total IN: 5145.4 mL    OUT:    Ileostomy (mL): 2800 mL    Voided (mL): 645 mL  Total OUT: 3445 mL    Total NET: 1700.4 mL      11 Feb 2023 07:01  -  11 Feb 2023 18:31  --------------------------------------------------------  IN:    dextrose 10%: 120 mL    Fat Emulsion (Fish Oil &amp; Plant Based) 20% Infusion: 41.6 mL    IV PiggyBack: 100 mL    IV PiggyBack: 100 mL    Oral Fluid: 400 mL    PPN (Peripheral Parenteral Nutrition): 166 mL    PRBCs (Packed Red Blood Cells): 300 mL  Total IN: 1227.6 mL    OUT:    Ileostomy (mL): 1500 mL    Voided (mL): 750 mL  Total OUT: 2250 mL    Total NET: -1022.4 mL          General: NAD, resting comfortably in bed  C/V: NSR  Pulm: Nonlabored breathing, no respiratory distress  Abd: soft, NT/ND.  Extrem: WWP, no edema, SCDs in place        LABS:                        7.4    24.35 )-----------( 533      ( 11 Feb 2023 06:20 )             22.5     02-11    133<L>  |  96  |  26<H>  ----------------------------<  129<H>  3.7   |  28  |  0.70    Ca    8.6      11 Feb 2023 05:30  Phos  3.0     02-11  Mg     1.8     02-11    TPro  7.9  /  Alb  2.4<L>  /  TBili  0.7  /  DBili  x   /  AST  33  /  ALT  42  /  AlkPhos  222<H>  02-10    PT/INR - ( 10 Feb 2023 13:26 )   PT: 14.5 sec;   INR: 1.22          PTT - ( 10 Feb 2023 13:26 )  PTT:26.7 sec  Urinalysis Basic - ( 11 Feb 2023 07:49 )    Color: Yellow / Appearance: Clear / SG: <=1.005 / pH: x  Gluc: x / Ketone: NEGATIVE  / Bili: Negative / Urobili: 0.2 E.U./dL   Blood: x / Protein: Trace mg/dL / Nitrite: NEGATIVE   Leuk Esterase: NEGATIVE / RBC: < 5 /HPF / WBC 5-10 /HPF   Sq Epi: x / Non Sq Epi: 0-5 /HPF / Bacteria: None /HPF        RADIOLOGY & ADDITIONAL STUDIES:   Consult for: LLE dry gangrene - reconsulted for step up to ICU w/new onset fever, tachycardia, rigors, and leukocytosis.     SUBJECTIVE: Patient seen and examined bedside. Today patient continues to be in low spirits. No acute complaints at this time. Denies any pain in the leg. Continuing to have low appetite but appears amenable to food after re-assurance and small talk.    aMIOdarone    Tablet 100 milliGRAM(s) Oral every 24 hours  DAPTOmycin IVPB      meropenem  IVPB 1000 milliGRAM(s) IV Intermittent every 8 hours  sacubitril 24 mG/valsartan 26 mG 1 Tablet(s) Oral two times a day      Vital Signs Last 24 Hrs  T(C): 38.3 (11 Feb 2023 18:05), Max: 40 (10 Feb 2023 23:00)  T(F): 100.9 (11 Feb 2023 18:05), Max: 104 (10 Feb 2023 23:00)  HR: 108 (11 Feb 2023 16:00) (97 - 149)  BP: 103/59 (11 Feb 2023 16:00) (70/48 - 116/78)  BP(mean): 74 (11 Feb 2023 16:00) (55 - 94)  RR: 19 (11 Feb 2023 16:00) (10 - 35)  SpO2: 98% (11 Feb 2023 16:00) (88% - 100%)    Parameters below as of 11 Feb 2023 16:00  Patient On (Oxygen Delivery Method): room air      I&O's Detail    10 Feb 2023 07:01  -  11 Feb 2023 07:00  --------------------------------------------------------  IN:    Fat Emulsion (Fish Oil &amp; Plant Based) 20% Infusion: 270.4 mL    IV PiggyBack: 300 mL    Lactated Ringers Bolus: 1000 mL    Lactated Ringers Bolus: 500 mL    Lactated Ringers Bolus: 250 mL    Lactated Ringers Bolus: 250 mL    Lactated Ringers Bolus: 250 mL    Lactated Ringers Bolus: 250 mL    PPN (Peripheral Parenteral Nutrition): 2075 mL  Total IN: 5145.4 mL    OUT:    Ileostomy (mL): 2800 mL    Voided (mL): 645 mL  Total OUT: 3445 mL    Total NET: 1700.4 mL      11 Feb 2023 07:01  -  11 Feb 2023 18:31  --------------------------------------------------------  IN:    dextrose 10%: 120 mL    Fat Emulsion (Fish Oil &amp; Plant Based) 20% Infusion: 41.6 mL    IV PiggyBack: 100 mL    IV PiggyBack: 100 mL    Oral Fluid: 400 mL    PPN (Peripheral Parenteral Nutrition): 166 mL    PRBCs (Packed Red Blood Cells): 300 mL  Total IN: 1227.6 mL    OUT:    Ileostomy (mL): 1500 mL    Voided (mL): 750 mL  Total OUT: 2250 mL    Total NET: -1022.4 mL      Physical Exam:  General: NAD, resting comfortably  HEENT: NC/AT, EOMI, normal hearing  Pulmonary: satting well no RA  Cardiovascular: NSR  Abdominal: soft, NT/ND, ileostomy w/light brown output  Extremities: LLE with dry gangrene just proximal to the popliteal fossa. Superior aspect of the gangrene in the posterior leg appears softened from previous exams.   Neuro: A/O x 3, CNs II-XII grossly intact, normal sensation, no focal deficits        LABS:                        7.4    24.35 )-----------( 533      ( 11 Feb 2023 06:20 )             22.5     02-11    133<L>  |  96  |  26<H>  ----------------------------<  129<H>  3.7   |  28  |  0.70    Ca    8.6      11 Feb 2023 05:30  Phos  3.0     02-11  Mg     1.8     02-11    TPro  7.9  /  Alb  2.4<L>  /  TBili  0.7  /  DBili  x   /  AST  33  /  ALT  42  /  AlkPhos  222<H>  02-10    PT/INR - ( 10 Feb 2023 13:26 )   PT: 14.5 sec;   INR: 1.22          PTT - ( 10 Feb 2023 13:26 )  PTT:26.7 sec  Urinalysis Basic - ( 11 Feb 2023 07:49 )    Color: Yellow / Appearance: Clear / SG: <=1.005 / pH: x  Gluc: x / Ketone: NEGATIVE  / Bili: Negative / Urobili: 0.2 E.U./dL   Blood: x / Protein: Trace mg/dL / Nitrite: NEGATIVE   Leuk Esterase: NEGATIVE / RBC: < 5 /HPF / WBC 5-10 /HPF   Sq Epi: x / Non Sq Epi: 0-5 /HPF / Bacteria: None /HPF        RADIOLOGY & ADDITIONAL STUDIES:

## 2023-02-11 NOTE — PROGRESS NOTE ADULT - SUBJECTIVE AND OBJECTIVE BOX
INTERVAL EVENTS:  -Tmax 104 overnight, +BCx   -Denies chest pain, SOB, palpitations    PAST MEDICAL & SURGICAL HISTORY:      MEDICATIONS  (STANDING):  aMIOdarone    Tablet 100 milliGRAM(s) Oral every 24 hours  ascorbic acid 500 milliGRAM(s) Oral daily  chlorhexidine 2% Cloths 1 Application(s) Topical <User Schedule>  cholestyramine Powder (Sugar-Free) 2 Gram(s) Oral three times a day  DAPTOmycin IVPB      DAPTOmycin IVPB 500 milliGRAM(s) IV Intermittent every 24 hours  dextrose 10%. 1000 milliLiter(s) (30 mL/Hr) IV Continuous <Continuous>  dextrose 5%. 1000 milliLiter(s) (50 mL/Hr) IV Continuous <Continuous>  dextrose 5%. 1000 milliLiter(s) (100 mL/Hr) IV Continuous <Continuous>  dextrose 5%. 1000 milliLiter(s) (50 mL/Hr) IV Continuous <Continuous>  dextrose 5%. 1000 milliLiter(s) (100 mL/Hr) IV Continuous <Continuous>  dextrose 50% Injectable 25 Gram(s) IV Push once  dextrose 50% Injectable 12.5 Gram(s) IV Push once  dextrose 50% Injectable 25 Gram(s) IV Push once  diphenoxylate/atropine 2 Tablet(s) Oral every 8 hours  glucagon  Injectable 1 milliGRAM(s) IntraMuscular once  glucagon  Injectable 1 milliGRAM(s) IntraMuscular once  heparin  Infusion 1000 Unit(s)/Hr (10 mL/Hr) IV Continuous <Continuous>  influenza  Vaccine (HIGH DOSE) 0.7 milliLiter(s) IntraMuscular once  insulin lispro (ADMELOG) corrective regimen sliding scale   SubCutaneous every 6 hours  loperamide 4 milliGRAM(s) Oral every 8 hours  meropenem  IVPB 1000 milliGRAM(s) IV Intermittent every 8 hours  metoprolol succinate ER 25 milliGRAM(s) Oral daily  mirtazapine 30 milliGRAM(s) Oral at bedtime  multivitamin 1 Tablet(s) Oral daily  opium Tincture 6 milliGRAM(s) Oral every 12 hours  pantoprazole    Tablet 40 milliGRAM(s) Oral two times a day  psyllium Powder 1 Packet(s) Oral every 8 hours  sacubitril 24 mG/valsartan 26 mG 1 Tablet(s) Oral two times a day  zinc sulfate 220 milliGRAM(s) Oral daily    MEDICATIONS  (PRN):  acetaminophen     Tablet .. 650 milliGRAM(s) Oral every 6 hours PRN Temp greater or equal to 38C (100.4F), Mild Pain (1 - 3)  dextrose Oral Gel 15 Gram(s) Oral once PRN Blood Glucose LESS THAN 70 milliGRAM(s)/deciliter  dextrose Oral Gel 15 Gram(s) Oral once PRN Blood Glucose LESS THAN 70 milliGRAM(s)/deciliter  melatonin 3 milliGRAM(s) Oral at bedtime PRN Insomnia  ondansetron Injectable 4 milliGRAM(s) IV Push every 6 hours PRN Nausea and/or Vomiting  sodium chloride 0.9% lock flush 10 milliLiter(s) IV Push every 1 hour PRN Pre/post blood products, medications, blood draw, and to maintain line patency    T(F): 98.7 (02-12-23 @ 00:27), Max: 101 (02-11-23 @ 14:00)  HR: 105 (02-12-23 @ 04:00) (91 - 109)  BP: 129/63 (02-12-23 @ 04:00) (76/54 - 137/65)  BP(mean): 90 (02-12-23 @ 04:00) (61 - 94)  ABP: --  ABP(mean): --  RR: 20 (02-12-23 @ 04:00) (12 - 26)  SpO2: 100% (02-12-23 @ 04:00) (78% - 100%)    I/O Detail 24H    10 Feb 2023 07:01  -  11 Feb 2023 07:00  --------------------------------------------------------  IN:    Fat Emulsion (Fish Oil &amp; Plant Based) 20% Infusion: 270.4 mL    IV PiggyBack: 300 mL    Lactated Ringers Bolus: 1000 mL    Lactated Ringers Bolus: 500 mL    Lactated Ringers Bolus: 250 mL    Lactated Ringers Bolus: 250 mL    Lactated Ringers Bolus: 250 mL    Lactated Ringers Bolus: 250 mL    PPN (Peripheral Parenteral Nutrition): 2075 mL  Total IN: 5145.4 mL    OUT:    Ileostomy (mL): 2800 mL    Voided (mL): 645 mL  Total OUT: 3445 mL    Total NET: 1700.4 mL      11 Feb 2023 07:01  -  12 Feb 2023 05:19  --------------------------------------------------------  IN:    dextrose 10%: 330 mL    Fat Emulsion (Fish Oil &amp; Plant Based) 20% Infusion: 41.6 mL    IV PiggyBack: 500 mL    IV PiggyBack: 150 mL    IV PiggyBack: 100 mL    Oral Fluid: 400 mL    PPN (Peripheral Parenteral Nutrition): 166 mL    PRBCs (Packed Red Blood Cells): 300 mL    Sodium Chloride 0.9% Bolus: 1000 mL  Total IN: 2987.6 mL    OUT:    Ileostomy (mL): 1900 mL    Voided (mL): 1140 mL  Total OUT: 3040 mL    Total NET: -52.4 mL          PHYSICAL EXAM:  GEN: NAD  HEENT: EOMI   RESP: CTA b/l  CV: RRR. Normal S1/S2. No m/r/g.  ABD: soft, non-distended  EXT: Dry gangrene LLE  NEURO: Somnolent    LABS:  CBC 02-12-23 @ 01:06                        9.8    15.47 )-----------( 529                   31.0       Hgb trend: 9.8 <-- , 6.1 <-- , 7.4 <-- , 6.8 <-- , 9.5 <-- , 8.1 <--   WBC trend: 15.47 <-- , 18.17 <-- , 24.35 <-- , 29.06 <-- , 21.15 <-- , 14.25 <--       CMP 02-11-23 @ 05:30    133<L>  |  96  |  26<H>  ----------------------------<  129<H>  3.7   |  28  |  0.70    Ca    8.6      02-11-23 @ 05:30  Phos  3.0     02-11  Mg     1.8     02-11    TPro  7.9  /  Alb  2.4<L>  /  TBili  0.7  /  DBili  x   /  AST  33  /  ALT  42  /  AlkPhos  222<H>  02-10      Serum Cr trend: 0.70 <-- , 0.70 <-- , 0.61 <--     PT/INR - ( 11 Feb 2023 19:17 )   PT: 16.6 sec;   INR: 1.39          PTT - ( 11 Feb 2023 19:17 ):33.4 sec    Cardiac Markers   02-11-23 @ 05:30: Trop T x     /    / CKMB x              STUDIES:

## 2023-02-11 NOTE — PROGRESS NOTE ADULT - SUBJECTIVE AND OBJECTIVE BOX
SUBJECTIVE:  Patient seen and examined on AM rounds with chief resident. Overnight, patient got his repeat CT A/P. Febrile, TMax 103. HR 140s. SBP 80-90s. Blood cultures positive for gram negative rods and gram positive cocci in pairs. This morning, he is feeling ok. Speaks little. Covered in warming blankets. No active complaints.     MEDICATIONS  (STANDING):  aMIOdarone    Tablet 100 milliGRAM(s) Oral every 24 hours  ascorbic acid 500 milliGRAM(s) Oral daily  chlorhexidine 2% Cloths 1 Application(s) Topical <User Schedule>  cholestyramine Powder (Sugar-Free) 2 Gram(s) Oral three times a day  DAPTOmycin IVPB      dextrose 10%. 1000 milliLiter(s) (30 mL/Hr) IV Continuous <Continuous>  dextrose 5%. 1000 milliLiter(s) (50 mL/Hr) IV Continuous <Continuous>  dextrose 5%. 1000 milliLiter(s) (50 mL/Hr) IV Continuous <Continuous>  dextrose 5%. 1000 milliLiter(s) (100 mL/Hr) IV Continuous <Continuous>  dextrose 5%. 1000 milliLiter(s) (100 mL/Hr) IV Continuous <Continuous>  dextrose 50% Injectable 25 Gram(s) IV Push once  dextrose 50% Injectable 12.5 Gram(s) IV Push once  dextrose 50% Injectable 25 Gram(s) IV Push once  diphenoxylate/atropine 2 Tablet(s) Oral every 8 hours  glucagon  Injectable 1 milliGRAM(s) IntraMuscular once  glucagon  Injectable 1 milliGRAM(s) IntraMuscular once  influenza  Vaccine (HIGH DOSE) 0.7 milliLiter(s) IntraMuscular once  insulin lispro (ADMELOG) corrective regimen sliding scale   SubCutaneous every 6 hours  loperamide 4 milliGRAM(s) Oral every 8 hours  meropenem  IVPB 1000 milliGRAM(s) IV Intermittent every 8 hours  mirtazapine 30 milliGRAM(s) Oral at bedtime  multivitamin 1 Tablet(s) Oral daily  opium Tincture 6 milliGRAM(s) Oral every 12 hours  pantoprazole    Tablet 40 milliGRAM(s) Oral two times a day  psyllium Powder 1 Packet(s) Oral every 8 hours  sacubitril 24 mG/valsartan 26 mG 1 Tablet(s) Oral two times a day  sodium chloride 0.9% Bolus 500 milliLiter(s) IV Bolus once  zinc sulfate 220 milliGRAM(s) Oral daily    MEDICATIONS  (PRN):  acetaminophen     Tablet .. 650 milliGRAM(s) Oral every 6 hours PRN Temp greater or equal to 38C (100.4F), Mild Pain (1 - 3)  dextrose Oral Gel 15 Gram(s) Oral once PRN Blood Glucose LESS THAN 70 milliGRAM(s)/deciliter  dextrose Oral Gel 15 Gram(s) Oral once PRN Blood Glucose LESS THAN 70 milliGRAM(s)/deciliter  melatonin 3 milliGRAM(s) Oral at bedtime PRN Insomnia  ondansetron Injectable 4 milliGRAM(s) IV Push every 6 hours PRN Nausea and/or Vomiting  sodium chloride 0.9% lock flush 10 milliLiter(s) IV Push every 1 hour PRN Pre/post blood products, medications, blood draw, and to maintain line patency      Vital Signs Last 24 Hrs  T(C): 38.3 (11 Feb 2023 14:00), Max: 40 (10 Feb 2023 23:00)  T(F): 101 (11 Feb 2023 14:00), Max: 104 (10 Feb 2023 23:00)  HR: 108 (11 Feb 2023 16:00) (95 - 149)  BP: 103/59 (11 Feb 2023 16:00) (70/48 - 116/78)  BP(mean): 74 (11 Feb 2023 16:00) (55 - 94)  RR: 19 (11 Feb 2023 16:00) (10 - 35)  SpO2: 98% (11 Feb 2023 16:00) (88% - 100%)    Parameters below as of 11 Feb 2023 16:00  Patient On (Oxygen Delivery Method): room air    Physical Exam:  General: NAD, resting comfortably in bed  Pulmonary: Nonlabored breathing, no respiratory distress  Cardiovascular: NSR  Abdominal: soft, NT/ND  Extremities: WWP, normal strength    I&O's Summary    10 Feb 2023 07:01  -  11 Feb 2023 07:00  --------------------------------------------------------  IN: 5145.4 mL / OUT: 3445 mL / NET: 1700.4 mL    11 Feb 2023 07:01  -  11 Feb 2023 17:59  --------------------------------------------------------  IN: 1227.6 mL / OUT: 2250 mL / NET: -1022.4 mL        LABS:                        7.4    24.35 )-----------( 533      ( 11 Feb 2023 06:20 )             22.5     02-11    133<L>  |  96  |  26<H>  ----------------------------<  129<H>  3.7   |  28  |  0.70    Ca    8.6      11 Feb 2023 05:30  Phos  3.0     02-11  Mg     1.8     02-11    TPro  7.9  /  Alb  2.4<L>  /  TBili  0.7  /  DBili  x   /  AST  33  /  ALT  42  /  AlkPhos  222<H>  02-10    PT/INR - ( 10 Feb 2023 13:26 )   PT: 14.5 sec;   INR: 1.22          PTT - ( 10 Feb 2023 13:26 )  PTT:26.7 sec  Urinalysis Basic - ( 11 Feb 2023 07:49 )    Color: Yellow / Appearance: Clear / SG: <=1.005 / pH: x  Gluc: x / Ketone: NEGATIVE  / Bili: Negative / Urobili: 0.2 E.U./dL   Blood: x / Protein: Trace mg/dL / Nitrite: NEGATIVE   Leuk Esterase: NEGATIVE / RBC: < 5 /HPF / WBC 5-10 /HPF   Sq Epi: x / Non Sq Epi: 0-5 /HPF / Bacteria: None /HPF      CAPILLARY BLOOD GLUCOSE      POCT Blood Glucose.: 130 mg/dL (11 Feb 2023 11:09)  POCT Blood Glucose.: 128 mg/dL (10 Feb 2023 22:46)  POCT Blood Glucose.: 91 mg/dL (10 Feb 2023 19:31)    LIVER FUNCTIONS - ( 10 Feb 2023 13:26 )  Alb: 2.4 g/dL / Pro: 7.9 g/dL / ALK PHOS: 222 U/L / ALT: 42 U/L / AST: 33 U/L / GGT: x             RADIOLOGY & ADDITIONAL STUDIES:

## 2023-02-12 NOTE — PROGRESS NOTE ADULT - SUBJECTIVE AND OBJECTIVE BOX
General Surgery Progress Note    SUBJECTIVE:   Patient seen and examined at bedside by chief during AM rounds. Overnight, heparin drip restarted and patient received 1 u PRBC. Patient reports he is in no pain this AM. Reports that he is not nauseous and has not vomited. Denies shortness of breath.     Vital Signs Last 24 Hrs  T(C): 37.4 (12 Feb 2023 07:00), Max: 38.3 (11 Feb 2023 14:00)  T(F): 99.3 (12 Feb 2023 07:00), Max: 101 (11 Feb 2023 14:00)  HR: 97 (12 Feb 2023 07:00) (91 - 113)  BP: 105/61 (12 Feb 2023 07:00) (76/54 - 137/65)  BP(mean): 79 (12 Feb 2023 07:00) (61 - 94)  RR: 18 (12 Feb 2023 07:00) (16 - 26)  SpO2: 97% (12 Feb 2023 07:00) (78% - 100%)    Parameters below as of 12 Feb 2023 07:00  Patient On (Oxygen Delivery Method): room air        I&O's Summary    11 Feb 2023 07:01  -  12 Feb 2023 07:00  --------------------------------------------------------  IN: 3527.6 mL / OUT: 3490 mL / NET: 37.6 mL    12 Feb 2023 07:01  -  12 Feb 2023 08:04  --------------------------------------------------------  IN: 40 mL / OUT: 0 mL / NET: 40 mL        Physical Exam:  General: Resting in bed, NAD, cachectic appearing  HEENT: ATNC  Pulmonary: Equal chest wall expansion b/l, no respiratory distress  Cardiovascular: NSR  Abdomen: Soft, nondistended, nontender. Ostomy noted in RLQ to be pink and patent with yellow stool output in ostomy appliance  Extremities: Cachectic appearing, LLE with dry gangrene to level of knee     LABS:                        9.9    14.76 )-----------( 502      ( 12 Feb 2023 05:23 )             30.5     02-12    133<L>  |  100  |  18  ----------------------------<  105<H>  4.3   |  25  |  0.62    Ca    8.4      12 Feb 2023 05:23  Phos  3.1     02-12  Mg     2.2     02-12    TPro  7.9  /  Alb  2.4<L>  /  TBili  0.7  /  DBili  x   /  AST  33  /  ALT  42  /  AlkPhos  222<H>  02-10    PT/INR - ( 11 Feb 2023 19:17 )   PT: 16.6 sec;   INR: 1.39          PTT - ( 11 Feb 2023 19:17 )  PTT:33.4 sec  Urinalysis Basic - ( 11 Feb 2023 07:49 )    Color: Yellow / Appearance: Clear / SG: <=1.005 / pH: x  Gluc: x / Ketone: NEGATIVE  / Bili: Negative / Urobili: 0.2 E.U./dL   Blood: x / Protein: Trace mg/dL / Nitrite: NEGATIVE   Leuk Esterase: NEGATIVE / RBC: < 5 /HPF / WBC 5-10 /HPF   Sq Epi: x / Non Sq Epi: 0-5 /HPF / Bacteria: None /HPF

## 2023-02-12 NOTE — PROGRESS NOTE ADULT - ATTENDING COMMENTS
Acute mesenteric ischemia - s/p ex-lap, SMA embolectomy, SBR, anastomosis, diverting ostomy, now monitoring intraabdominal collection, will repeat imaging with contrast to evaluate if amenable to IR drainage. if not will have to consider taking to OR this week for surgical evacuation and concurrent ostomy closure  High ostomy output - continues despite anti-motility agents, CT obtained to evaluate patency of anastomosis, no evidence of leak, will plan for reversal of ostomy when optimized nutritionally  Malnutrition - continue diet, vargas counts, ensure TID, PPN discontinued given bacteremia  A Flutter - s/p cardioversion, anticoagulation course for cardioversion to complete 1/28, will reengage cards to rule out underlying cardiogenic/arrhythmia contributing to clinical picture  LV thrombus - long term anticoagulation, convert from lovenox to heparin gtt in anticipation of upcoming interventions  LLE ischemia - AKA outpatient per vascular surgery  VRE and Klebsiella Bacteremia - ID engaged, continue antibiotics    Late entry, date of service 2/12/23

## 2023-02-12 NOTE — PROGRESS NOTE ADULT - ASSESSMENT
77M with PMHx of IVDU found unresponsive at his nursing home, resolved after Narcan in the field and brought to [facility]. Found to have PE, atrial flutter, and large LV thrombus on echo. Transferred to [facility] for further management. Pt c/o abdominal pain on 12/10 CTA showing mid SMA with embolus. Abnormal distal small bowel loops and cecum with dilatation and pneumatosis suggesting infarcted bowel. One or two tiny foci of intrahepatic portal vein pneumatosis. Segmental infarction upper pole left kidney. Now s/p Ex lap, SMA embolectomy, 80cm SBR, abthera vac left in discontinuity (12/11) and transferred to SICU intubated. S/p second look (12/13) and most recently s/p OR for 3rd look, end-to-end anastomosis of remaining bowel, loop ileostomy and abdomen closure (12/15). Now stepped down to telemtry. LLE ischemia, AKA delayed by nutritional optimization. Transferred to SICU in the setting of sepsis for HD monitoring.     Plan:   Neuro: No active issues   CV: Hypotension 2/2 sepsis/hypovolemia. Spirolactone held. IV abx started. Hypovolemic: Bolus as needed. D5LR started at 150/hr. Repeat echo with EF 55-60%. Mildly dilated RV. Maintain MAP >65. A.Fib/Flutter: s/p cardioversion. Continue metoprolol/amiodarone   Pulm: Sating well on nasal canula   GI/FEN: High Ostomy output. Replete. Continue Immodium/Tincture of Opium. PPN discontinued in the setting of bacteremia. D10 started d/c'ed. D5LR started.   : Cath in place. Strict I and O.   Endo: mISS  Heme: Anemia H/H 7.4/22.5 1 unit PRBCs per surgery. LV thrombis w/ LLE ischemia. SQL on hold for possible procedure. Heparin drip started. Active T&S;   ID: Sepsis 2/2 K. Pneumoniae/Enterococcus VRE. Luis (2/10--) (Dapto 2/11--)   dc//:: Zosyn. Vanc (2/10--2/11).  PPX: SCDs, CT of the abdomen showing persistent fluid collection in the RLQ. Awaiting further surg rec;s. ID consulted.  L/D/T: PIV  PT/OT: Not ordered  Dispo: [facility]

## 2023-02-12 NOTE — PROGRESS NOTE ADULT - ASSESSMENT
77M with PMHx of IVDU found unresponsive at his nursing home, resolved after Narcan in the field and brought to [facility]. Found to have PE, atrial flutter, and large LV thrombus on echo. Transferred to [facility] for further management. Pt c/o abdominal pain on 12/10 CTA showing mid SMA with embolus. Abnormal distal small bowel loops and cecum with dilatation and pneumatosis suggesting infarcted bowel. One or two tiny foci of intrahepatic portal vein pneumatosis. Segmental infarction upper pole left kidney. Now s/p Ex lap, SMA embolectomy, 80cm SBR, abthera vac left in discontinuity (12/11) and transferred to SICU intubated. S/p second look (12/13) and most recently s/p OR for 3rd look, end-to-end anastomosis of remaining bowel, loop ileostomy and abdomen closure (12/15). Now stepped down to telemtry. LLE ischemia, AKA delayed by nutritional optimization. Transferred to SICU in the setting of sepsis for HD monitoring (2/10).     - Continue heparin gtt  - Monitor for tachycardia  - F/U BCx - 2/10 growing Enterococcus and Klebsiella  - Possible CT scan today  - Rest of care per SICU

## 2023-02-12 NOTE — PROVIDER CONTACT NOTE (OTHER) - SITUATION
Patient verbalized "If I could kill myself, I would. All of these needles and tests are too much, I would rather be dead."

## 2023-02-12 NOTE — PROGRESS NOTE ADULT - SUBJECTIVE AND OBJECTIVE BOX
INFECTIOUS DISEASES CONSULT FOLLOW-UP NOTE    INTERVAL HPI/OVERNIGHT EVENTS:  febrile to 100.3 overnight  patient awake, denied n/v, abdominal pain  WBC downtrending    ROS:   Constitutional, eyes, ENT, cardiovascular, respiratory, gastrointestinal, genitourinary, integumentary, neurological, psychiatric and heme/lymph are otherwise negative other than noted above       ANTIBIOTICS/RELEVANT:    MEDICATIONS  (STANDING):  aMIOdarone    Tablet 100 milliGRAM(s) Oral every 24 hours  ascorbic acid 500 milliGRAM(s) Oral daily  chlorhexidine 2% Cloths 1 Application(s) Topical <User Schedule>  cholestyramine Powder (Sugar-Free) 2 Gram(s) Oral three times a day  DAPTOmycin IVPB      DAPTOmycin IVPB 500 milliGRAM(s) IV Intermittent every 24 hours  dextrose 5% + lactated ringers. 1000 milliLiter(s) (150 mL/Hr) IV Continuous <Continuous>  dextrose 5%. 1000 milliLiter(s) (100 mL/Hr) IV Continuous <Continuous>  dextrose 5%. 1000 milliLiter(s) (50 mL/Hr) IV Continuous <Continuous>  dextrose 5%. 1000 milliLiter(s) (50 mL/Hr) IV Continuous <Continuous>  dextrose 5%. 1000 milliLiter(s) (100 mL/Hr) IV Continuous <Continuous>  dextrose 50% Injectable 25 Gram(s) IV Push once  dextrose 50% Injectable 12.5 Gram(s) IV Push once  dextrose 50% Injectable 25 Gram(s) IV Push once  diphenoxylate/atropine 2 Tablet(s) Oral every 8 hours  glucagon  Injectable 1 milliGRAM(s) IntraMuscular once  glucagon  Injectable 1 milliGRAM(s) IntraMuscular once  heparin  Infusion 1000 Unit(s)/Hr (11 mL/Hr) IV Continuous <Continuous>  influenza  Vaccine (HIGH DOSE) 0.7 milliLiter(s) IntraMuscular once  insulin lispro (ADMELOG) corrective regimen sliding scale   SubCutaneous every 6 hours  loperamide 4 milliGRAM(s) Oral every 8 hours  meropenem  IVPB 1000 milliGRAM(s) IV Intermittent every 8 hours  metoprolol succinate ER 25 milliGRAM(s) Oral daily  mirtazapine 30 milliGRAM(s) Oral at bedtime  multivitamin 1 Tablet(s) Oral daily  opium Tincture 6 milliGRAM(s) Oral every 12 hours  pantoprazole    Tablet 40 milliGRAM(s) Oral two times a day  psyllium Powder 1 Packet(s) Oral every 8 hours  sacubitril 24 mG/valsartan 26 mG 1 Tablet(s) Oral two times a day  zinc sulfate 220 milliGRAM(s) Oral daily    MEDICATIONS  (PRN):  acetaminophen     Tablet .. 650 milliGRAM(s) Oral every 6 hours PRN Temp greater or equal to 38C (100.4F), Mild Pain (1 - 3)  dextrose Oral Gel 15 Gram(s) Oral once PRN Blood Glucose LESS THAN 70 milliGRAM(s)/deciliter  dextrose Oral Gel 15 Gram(s) Oral once PRN Blood Glucose LESS THAN 70 milliGRAM(s)/deciliter  melatonin 3 milliGRAM(s) Oral at bedtime PRN Insomnia  ondansetron Injectable 4 milliGRAM(s) IV Push every 6 hours PRN Nausea and/or Vomiting  sodium chloride 0.9% lock flush 10 milliLiter(s) IV Push every 1 hour PRN Pre/post blood products, medications, blood draw, and to maintain line patency        Vital Signs Last 24 Hrs  T(C): 37.3 (12 Feb 2023 10:00), Max: 38.3 (11 Feb 2023 14:00)  T(F): 99.2 (12 Feb 2023 10:00), Max: 101 (11 Feb 2023 14:00)  HR: 98 (12 Feb 2023 12:00) (91 - 113)  BP: 130/61 (12 Feb 2023 12:00) (76/54 - 137/65)  BP(mean): 87 (12 Feb 2023 12:00) (61 - 94)  RR: 36 (12 Feb 2023 12:00) (16 - 63)  SpO2: 96% (12 Feb 2023 12:00) (78% - 100%)    Parameters below as of 12 Feb 2023 12:00  Patient On (Oxygen Delivery Method): room air        02-11-23 @ 07:01  -  02-12-23 @ 07:00  --------------------------------------------------------  IN: 3527.6 mL / OUT: 3490 mL / NET: 37.6 mL    02-12-23 @ 07:01  -  02-12-23 @ 12:37  --------------------------------------------------------  IN: 793 mL / OUT: 30 mL / NET: 763 mL      PHYSICAL EXAM:  Constitutional: awake, NAD  Eyes: the sclera and conjunctiva were normal.   ENT: the ears and nose were normal in appearance.   Pulmonary: no respiratory distress and lungs were clear to auscultation bilaterally.   Heart: heart rate was normal and rhythm regular, normal S1 and S2  Vascular:. there was no peripheral edema  Abdomen: normal bowel sounds, soft, ileostomy bag  Neurological: appropriately answering questions          LABS:                        9.9    14.76 )-----------( 502      ( 12 Feb 2023 05:23 )             30.5     02-12    133<L>  |  100  |  18  ----------------------------<  105<H>  4.3   |  25  |  0.62    Ca    8.4      12 Feb 2023 05:23  Phos  3.1     02-12  Mg     2.2     02-12    TPro  7.9  /  Alb  2.4<L>  /  TBili  0.7  /  DBili  x   /  AST  33  /  ALT  42  /  AlkPhos  222<H>  02-10    PT/INR - ( 11 Feb 2023 19:17 )   PT: 16.6 sec;   INR: 1.39          PTT - ( 12 Feb 2023 08:09 )  PTT:45.2 sec  Urinalysis Basic - ( 11 Feb 2023 07:49 )    Color: Yellow / Appearance: Clear / SG: <=1.005 / pH: x  Gluc: x / Ketone: NEGATIVE  / Bili: Negative / Urobili: 0.2 E.U./dL   Blood: x / Protein: Trace mg/dL / Nitrite: NEGATIVE   Leuk Esterase: NEGATIVE / RBC: < 5 /HPF / WBC 5-10 /HPF   Sq Epi: x / Non Sq Epi: 0-5 /HPF / Bacteria: None /HPF        MICROBIOLOGY:      RADIOLOGY & ADDITIONAL STUDIES:  Reviewed

## 2023-02-12 NOTE — PROGRESS NOTE ADULT - ASSESSMENT
78M h/o prolonged hospital admission, mesenteric ischemia s/p SBR 12/2022 c/b intraabdominal hematoma/abscess, LE gangrene pending possible AKA, now found to be severe sepsis 2/2 polymicrobial bcteremia (ESBL K.pneumo, VRE), likely 2/2 undrained intraabdominal abscess.  CT c/a/p showed only slightly decreased abscess size.  Team spoke to IR for possible drainage, as he is very high risk for surgical washout.    - cont daptomycin 500mg IV q24h  - stop william  - start ertapenem 1g IV q24h  - f/u surveillance BCx  - f/u IR for drainage plan    Team 1 will follow you.  Dr Guzman will resume care on Monday.  Case d/w primary team.    Shraddha Jerome MD, MS  Infectious Disease attending  work cell [phone]   For any questions during evening/weekend/holiday, please page ID on call

## 2023-02-12 NOTE — PROGRESS NOTE ADULT - SUBJECTIVE AND OBJECTIVE BOX
Interval Events: SBP 70's prior to CT scan. 500cc bolus given with no response. Additional 1L given. SBP in the low 100's. CT deferred until today.        Subjective Events: Patient seen and examined at bedside. No acute distress. Expresses generalized discomfort. No specific complains in regards to the ROS.       Allergies  No Known Allergies      Vital Signs Last 24 Hrs  T(C): 37.3 (12 Feb 2023 10:00), Max: 38.3 (11 Feb 2023 14:00)  T(F): 99.2 (12 Feb 2023 10:00), Max: 101 (11 Feb 2023 14:00)  HR: 92 (12 Feb 2023 10:00) (91 - 113)  BP: 127/62 (12 Feb 2023 10:00) (76/54 - 137/65)  BP(mean): 88 (12 Feb 2023 10:00) (61 - 94)  RR: 24 (12 Feb 2023 10:00) (16 - 52)  SpO2: 99% (12 Feb 2023 10:00) (78% - 100%)    Parameters below as of 12 Feb 2023 10:00  Patient On (Oxygen Delivery Method): room air        02-11 @ 07:01 - 02-12 @ 07:00  --------------------------------------------------------  IN: 3527.6 mL / OUT: 3490 mL / NET: 37.6 mL    02-12 @ 07:01 - 02-12 @ 10:25  --------------------------------------------------------  IN: 462 mL / OUT: 30 mL / NET: 432 mL      02-11 @ 07:01  -  02-12 @ 07:00  --------------------------------------------------------  IN: 3527.6 mL / OUT: 3490 mL / NET: 37.6 mL    02-12 @ 07:01  -  02-12 @ 10:25  --------------------------------------------------------  IN: 462 mL / OUT: 30 mL / NET: 432 mL        Physical Exam:   Gen: No acute distress   Neuro: A&OX3 No deficits  CV: Tachycardic, regular rhythm, no peripheral edema   Pulm: Lung sounds clear bilaterally   Abd: Soft NT ND + BS. Ostomy draining large amounts of tan/yellow output  Ext: Left lower extremity with dry gangrene from toes to the knee, wrapped with curlex.   Vasc: +1 right pedal/bilateral radial pulses   Skin: no rashes noted  MSK: No joint swelling  Psych: No signs of anxiety or depression    LABS  CBC Full  -  ( 12 Feb 2023 05:23 )  WBC Count : 14.76 K/uL  RBC Count : 3.51 M/uL  Hemoglobin : 9.9 g/dL  Hematocrit : 30.5 %  Platelet Count - Automated : 502 K/uL  Mean Cell Volume : 86.9 fl  Mean Cell Hemoglobin : 28.2 pg  Mean Cell Hemoglobin Concentration : 32.5 gm/dL      02-12    133<L>  |  100  |  18  ----------------------------<  105<H>  4.3   |  25  |  0.62    Ca    8.4      12 Feb 2023 05:23  Phos  3.1     02-12  Mg     2.2     02-12    TPro  7.9  /  Alb  2.4<L>  /  TBili  0.7  /  DBili  x   /  AST  33  /  ALT  42  /  AlkPhos  222<H>  02-10    PT/INR - ( 11 Feb 2023 19:17 )   PT: 16.6 sec;   INR: 1.39          PTT - ( 12 Feb 2023 08:09 )  PTT:45.2 sec

## 2023-02-13 NOTE — PROGRESS NOTE ADULT - ASSESSMENT
77M with PMHx of IVDU found unresponsive at his nursing home, resolved after Narcan in the field and brought to [facility]. Found to have PE, atrial flutter, and large LV thrombus on echo. Transferred to [facility] for further management. Pt c/o abdominal pain on 12/10 CTA showing mid SMA with embolus. Abnormal distal small bowel loops and cecum with dilatation and pneumatosis suggesting infarcted bowel. One or two tiny foci of intrahepatic portal vein pneumatosis. Segmental infarction upper pole left kidney. Now s/p Ex lap, SMA embolectomy, 80cm SBR, abthera vac left in discontinuity (12/11) and transferred to SICU intubated. S/p second look (12/13) and most recently s/p OR for 3rd look, end-to-end anastomosis of remaining bowel, loop ileostomy and abdomen closure (12/15). Now stepped down to telemtry. LLE ischemia, AKA delayed by nutritional optimization. Transferred to SICU in the setting of sepsis for HD monitoring.    Plan:  f/u w/ IR for possible drainage of abdominal collection  f/u Cards recs    Rest of care per SICU

## 2023-02-13 NOTE — PROGRESS NOTE ADULT - ASSESSMENT
78M h/o prolonged hospital admission, mesenteric ischemia s/p SBR 12/2022 c/b intraabdominal hematoma/abscess, LE gangrene pending possible AKA, now found to be severe sepsis 2/2 polymicrobial bcteremia (ESBL K.pneumo, VRE), likely 2/2 undrained intraabdominal abscess.  CT c/a/p showed only slightly decreased abscess size.  Team spoke to IR for possible drainage, as he is very high risk for surgical washout.    - cont daptomycin 500mg IV q24h  - cont ertapenem 1g IV q24h  - f/u surveillance BCx  - f/u IR for drainage plan    Team 1 will follow you.      78M h/o prolonged hospital admission, mesenteric ischemia s/p SBR 12/2022 c/b intraabdominal hematoma/abscess, LE gangrene pending possible AKA, now found to be severe sepsis 2/2 polymicrobial bcteremia (ESBL K.pneumo, VRE), likely 2/2 undrained intraabdominal abscess.  CT c/a/p showed only slightly decreased abscess size.  Team spoke to IR for possible drainage, as he is very high risk for surgical washout.    - cont daptomycin 500mg IV q24h  - cont ertapenem 1g IV q24h  - BCx 2/10 growing ESBL Kleb pneumo and VRE, most likely 2/2 undrained abdominal abcesses  - Follow up with IR for drainage plan  - CT A/P 2/12 with 7.4x5.5x7.7 cm loculated fluid collected in RLQ    ID Team 1 will continue to follow. Note not final until attending attestation available.    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  CT c/a/p showed only slightly decreased abscess size.  Team spoke to IR for possible drainage, as he is very high risk for surgical washout.    - cont daptomycin 500mg IV q24h  - cont ertapenem 1g IV q24h  - BCx 2/10 growing ESBL Kleb pneumo and VRE, most likely 2/2 undrained abdominal abcesses, BCx 2/11 NGTD  - Follow up with IR for drainage plan  - CT A/P 2/12 with 7.4x5.5x7.7 cm loculated fluid collected in RLQ    ID Team 1 will continue to follow. Note not final until attending attestation available.    Case discussed with ID attending Dr Guzman

## 2023-02-13 NOTE — PROGRESS NOTE ADULT - ATTENDING COMMENTS
Agree with above.  Patient with bacteremia secondary to undrained abscess.  Recommend IR vs surgical drainage given that this bacteremia developed on antibiotic therapy.  Can continue Daptomycin + Ertapenem

## 2023-02-13 NOTE — BH CONSULTATION LIAISON PROGRESS NOTE - NSBHFUPINTERVALHXFT_PSY_A_CORE
Pt is a 78-year-old Black, cisgender male with prolonged hospitalization, mesenteric ischemia s/p SBR 12/2022, c/b intra-abdominal hematoma, LE gangrene, malnutrition/malabsorption. Transferred to SICU in the setting of sepsis for HD monitoring.     C/L psychology intern met with pt for 15-minute individual psychotherapy session. Upon approach, pt was awake, lying in bed. Pt was AAOx4. He reported he is "finally eating" but upon further probing, pt is still not eating hospital food, but is eating pantry-stable food brought in by his sister. He reported he is "doing alright" but does not understand how he got a fever. Pt was focused on increasing his immediate comfort needs; he expressed feeling cold and wanting warm food; pt requested psychology intern to assist in communicating these needs to nursing staff. Writer provided empathic, active, reflective listening, as well as validation of pt's frustration around prolonged hospitalization. Pt denied SI/HI/SIB/AH/VH/PI.

## 2023-02-13 NOTE — PROGRESS NOTE ADULT - ASSESSMENT
77y y/o Male with significant PMHx of IVDU found unresponsive at his nursing home, resolved after Narcan in the field, and brought to [facility]. Found to have PE, atrial flutter, and large LV thrombus on echo. Transferred to [facility] for further management. Pt C/o abdominal pain on 12/10 CTA showing mid SMA with embolus. Abnormal distal small bowel loops and cecum with dilatation and pneumatosis suggesting infarcted bowel. One or two tiny foci of  intrahepatic portal vein pneumatosis. Segmental infarction upper pole left kidney. Now s/p Ex lap, SMA embolectomy, 80cm SBR, abthera vac left in discontinuity (12/11) and transferred to SICU intubated. S/p second look (12/13) and most recently s/p OR for 3rd look, end-to-end anastomosis of remaining bowel, loop ileostomy and abdomen closure (12/15).  LLE Ischemia has not yet fully demarcated, it is clear that the posterior calf (which is needed to heal a BKA flap) is involved and therefore only surgical option available to the patient is an AKA pending medical optimization. LLE gangrene dry at this time.     - LLE unchanged from prior examinations, dry gangrene extending from toes to just below the knee, no wet gangrene observed  - No vascular surgery intervention precluding discharge  - Patient can follow up with Dr. Greenberg when nutritional status is optimized for elective amputation  - Continue daily local wound care with betadine to all skin below the line of demarcation of his left shin and Kerlix. No offloading boot, keep left heel elevated off bed.   - Rest of care per primary team  - Vascular will sign off at this time. Please reconsult as needed or for any further questions.

## 2023-02-13 NOTE — PROGRESS NOTE ADULT - SUBJECTIVE AND OBJECTIVE BOX
SUBJECTIVE:  Repeat CT w/ PO contrast showing bowel interposed between collection and abd wall, IR deferring drainage at this time. Continues to be septic without clear source. No changes in LLE at this time.    MEDICATIONS  (STANDING):  aMIOdarone    Tablet 100 milliGRAM(s) Oral every 24 hours  ascorbic acid 500 milliGRAM(s) Oral daily  chlorhexidine 2% Cloths 1 Application(s) Topical <User Schedule>  cholestyramine Powder (Sugar-Free) 2 Gram(s) Oral three times a day  DAPTOmycin IVPB      DAPTOmycin IVPB 500 milliGRAM(s) IV Intermittent every 24 hours  dextrose 5% + lactated ringers. 1000 milliLiter(s) (100 mL/Hr) IV Continuous <Continuous>  dextrose 5%. 1000 milliLiter(s) (50 mL/Hr) IV Continuous <Continuous>  dextrose 5%. 1000 milliLiter(s) (100 mL/Hr) IV Continuous <Continuous>  dextrose 5%. 1000 milliLiter(s) (50 mL/Hr) IV Continuous <Continuous>  dextrose 5%. 1000 milliLiter(s) (100 mL/Hr) IV Continuous <Continuous>  dextrose 50% Injectable 25 Gram(s) IV Push once  dextrose 50% Injectable 12.5 Gram(s) IV Push once  dextrose 50% Injectable 25 Gram(s) IV Push once  diphenoxylate/atropine 2 Tablet(s) Oral every 8 hours  ertapenem  IVPB 1000 milliGRAM(s) IV Intermittent every 24 hours  glucagon  Injectable 1 milliGRAM(s) IntraMuscular once  glucagon  Injectable 1 milliGRAM(s) IntraMuscular once  heparin  Infusion 1000 Unit(s)/Hr (14 mL/Hr) IV Continuous <Continuous>  influenza  Vaccine (HIGH DOSE) 0.7 milliLiter(s) IntraMuscular once  insulin lispro (ADMELOG) corrective regimen sliding scale   SubCutaneous every 6 hours  loperamide 4 milliGRAM(s) Oral every 8 hours  metoprolol succinate ER 25 milliGRAM(s) Oral daily  mirtazapine 30 milliGRAM(s) Oral at bedtime  multivitamin 1 Tablet(s) Oral daily  opium Tincture 6 milliGRAM(s) Oral every 12 hours  pantoprazole    Tablet 40 milliGRAM(s) Oral two times a day  psyllium Powder 1 Packet(s) Oral every 8 hours  sacubitril 49 mG/valsartan 51 mG 1 Tablet(s) Oral two times a day  zinc sulfate 220 milliGRAM(s) Oral daily    MEDICATIONS  (PRN):  acetaminophen     Tablet .. 650 milliGRAM(s) Oral every 6 hours PRN Temp greater or equal to 38C (100.4F), Mild Pain (1 - 3)  dextrose Oral Gel 15 Gram(s) Oral once PRN Blood Glucose LESS THAN 70 milliGRAM(s)/deciliter  dextrose Oral Gel 15 Gram(s) Oral once PRN Blood Glucose LESS THAN 70 milliGRAM(s)/deciliter  melatonin 3 milliGRAM(s) Oral at bedtime PRN Insomnia  ondansetron Injectable 4 milliGRAM(s) IV Push every 6 hours PRN Nausea and/or Vomiting  sodium chloride 0.9% lock flush 10 milliLiter(s) IV Push every 1 hour PRN Pre/post blood products, medications, blood draw, and to maintain line patency      Vital Signs Last 24 Hrs  T(C): 37.2 (13 Feb 2023 17:43), Max: 38.5 (12 Feb 2023 21:00)  T(F): 99 (13 Feb 2023 17:43), Max: 101.3 (12 Feb 2023 21:00)  HR: 102 (13 Feb 2023 18:00) (84 - 124)  BP: 126/64 (13 Feb 2023 18:00) (81/52 - 155/71)  BP(mean): 87 (13 Feb 2023 18:00) (61 - 116)  RR: 13 (13 Feb 2023 18:00) (9 - 36)  SpO2: 100% (13 Feb 2023 18:00) (85% - 100%)    Parameters below as of 13 Feb 2023 18:00  Patient On (Oxygen Delivery Method): room air        Physical Exam:  General: NAD, resting comfortably in bed  Pulmonary: Nonlabored breathing, no respiratory distress  Cardiovascular: NSR  Abdominal: soft, NT/ND  Extremities: WWP, normal strength, dry gangrene to below knee LLE  Neuro: A/O x 3, CNs II-XII grossly intact, no focal deficits    I&O's Summary    12 Feb 2023 07:01  -  13 Feb 2023 07:00  --------------------------------------------------------  IN: 3931 mL / OUT: 2850 mL / NET: 1081 mL    13 Feb 2023 07:01  -  13 Feb 2023 18:45  --------------------------------------------------------  IN: 1607 mL / OUT: 1260 mL / NET: 347 mL        LABS:                        10.8   13.32 )-----------( 476      ( 13 Feb 2023 05:30 )             33.3     02-13    134<L>  |  102  |  11  ----------------------------<  105<H>  3.9   |  26  |  0.66    Ca    8.9      13 Feb 2023 05:30  Phos  2.9     02-13  Mg     2.0     02-13      PT/INR - ( 13 Feb 2023 05:30 )   PT: 15.4 sec;   INR: 1.29          PTT - ( 13 Feb 2023 09:54 )  PTT:80.1 sec    CAPILLARY BLOOD GLUCOSE      POCT Blood Glucose.: 99 mg/dL (13 Feb 2023 17:56)  POCT Blood Glucose.: 114 mg/dL (13 Feb 2023 12:44)  POCT Blood Glucose.: 108 mg/dL (12 Feb 2023 23:26)  POCT Blood Glucose.: 96 mg/dL (12 Feb 2023 18:48)

## 2023-02-13 NOTE — PROGRESS NOTE ADULT - ASSESSMENT
77M with PMHx of IVDU found unresponsive at his nursing home, resolved after Narcan in the field and brought to [facility]. Found to have PE, atrial flutter, and large LV thrombus on echo. Transferred to [facility] for further management. Pt c/o abdominal pain on 12/10 CTA showing mid SMA with embolus. Abnormal distal small bowel loops and cecum with dilatation and pneumatosis suggesting infarcted bowel. One or two tiny foci of intrahepatic portal vein pneumatosis. Segmental infarction upper pole left kidney. Now s/p Ex lap, SMA embolectomy, 80cm SBR, abthera vac left in discontinuity (12/11) and transferred to SICU intubated. S/p second look (12/13) and most recently s/p OR for 3rd look, end-to-end anastomosis of remaining bowel, loop ileostomy and abdomen closure (12/15). Now stepped down to telemtry. LLE ischemia, AKA delayed by nutritional optimization. Transferred to SICU in the setting of sepsis for HD monitoring.     Plan:    Neuro: No active issues   CV: Hypotension 2/2 sepsis/hypovolemia. Spirolactone held. IV abx started. Hypovolemic: Bolus as needed. D5LR started at 150/hr. Repeat echo with EF 55-60%. Mildly dilated RV. Maintain MAP >65. A.Fib/Flutter: s/p cardioversion. Continue metoprolol/amiodarone   Pulm: Sating well on nasal canula   GI/FEN: High Ostomy output. Replete. Continue Immodium/Tincture of Opium. PPN discontinued in the setting of bacteremia. D5LR started.   : Condom cath in place. Strict I and O.   Endo: mISS  Heme: Anemia H/H 7.4/22.5 s/p 2uPRBC (02/11). LV thrombis w/ LLE ischemia. SQL on hold for possible procedure. Heparin drip started. Active T&S;   ID: Sepsis 2/2 K. Pneumoniae/Enterococcus VRE. Luis (2/10--) (Dapto 2/11--)   dc//:: Zosyn. Vanc (2/10--2/11).  PPX: SCDs  L/D/T: PIV  PT/OT: Not ordered  Dispo: SICU, [facility]

## 2023-02-13 NOTE — BH CONSULTATION LIAISON PROGRESS NOTE - NSBHASSESSMENTFT_PSY_ALL_CORE
Patient is a 78 year old male with history of substance use and no significant past medical history (poor medical follow up, last PCP visit 20 years prior to admission), presenting with unresponsiveness from nursing home to [facility], found to be in Aflutter w RVR with subsegmental PE RUL, transferred to [facility], found to have LV thrombus and worsening HF, EF 10-15%. 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 Course further complicated by LLE pulselessness and ulcerations that will ultimately require BKA. Psychiatry consulted due to chronic depressed mood in the hospital and suicidality. Per staff pt verbalizes Si without intent, when he experiences high physical distress (pain). Upon re-assessment today patient presents with similarly low mood and hopelessness about his medical conditions, slightly more fatigued and with some confusion (disoriented to the day of the week). He continues to endorse feeling motivated to engage in treatment and work on improving his physical and nutritional state and denies active SI. Patient's past statements of wanting to die appear to be triggered by moments of high distress due to pain and frustration with lack of improvement in his physical state and not actual suicidal statements.    Plan:  -patient does not require 1:1 observation for acute suicidality;  -patient to be followed daily by the Psychology team for supportive psychotherapy    -continue mirtazapine 30mg QHS for ongoing depressive sx and insomnia, will observe for response  -Encourage sleep hygiene and limiting sleep during the day    -Delirium precautions  -Psychology to follow the pt daily for supportive psychotherapy

## 2023-02-13 NOTE — BH CONSULTATION LIAISON PROGRESS NOTE - NSBHATTESTCOMMENTATTENDFT_PSY_A_CORE
Patient is a 78 year old male with history of substance use and no significant past medical history (poor medical follow up, last PCP visit 20 years prior to admission), presenting with unresponsiveness from nursing home to [facility], found to be in Aflutter w RVR with subsegmental PE RUL, transferred to [facility], found to have LV thrombus and worsening HF, EF 10-15%. 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 Course further complicated by LLE pulselessness and ulcerations that will ultimately require BKA. Psychiatry consulted due to chronic depressed mood in the hospital and suicidality. Per staff pt verbalizes Si without intent, when he experiences high physical distress (pain). Upon re-assessment today patient presents with similarly low mood and hopelessness about his medical conditions, slightly more fatigued and with some confusion (disoriented to the day of the week). He continues to endorse feeling motivated to engage in treatment and work on improving his physical and nutritional state and denies active SI. Patient's past statements of wanting to die appear to be triggered by moments of high distress due to pain and frustration with lack of improvement in his physical state and not actual suicidal statements.    Plan:  -patient does not require 1:1 observation for acute suicidality; consider nursing enhanced supervision   -patient to be followed daily by the Psychology team for supportive psychotherapy    -continue mirtazapine 30mg QHS for ongoing depressive sx and insomnia, will observe for response  -Encourage sleep hygiene and limiting sleep during the day    -Delirium precautions  -Psychology to follow the pt daily for supportive psychotherapy

## 2023-02-13 NOTE — CONSULT NOTE ADULT - SUBJECTIVE AND OBJECTIVE BOX
77M with PMHx of IVDU found unresponsive at his nursing home, resolved after Narcan in the field and brought to [facility]. Found to have PE, atrial flutter, and large LV thrombus on echo. Transferred to [facility] for further management. Pt c/o abdominal pain on 12/10 CTA showing mid SMA with embolus. Abnormal distal small bowel loops and cecum with dilatation and pneumatosis suggesting infarcted bowel. One or two tiny foci of intrahepatic portal vein pneumatosis. Segmental infarction upper pole left kidney. Now s/p Ex lap, SMA embolectomy, 80cm SBR, abthera vac left in discontinuity (12/11) and transferred to SICU intubated. S/p second look (12/13) and most recently s/p OR for 3rd look, end-to-end anastomosis of remaining bowel, loop ileostomy and abdomen closure (12/15). IR consulted 1/27 for RLQ abdominal collection drainage, which was not amenable to percutaneous drainage. IR consulted 2/13 for reevaluation.     Clinical History: AFIB    Yes    Handoff    MEWS Score    Acute mesenteric ischemia    Acute mesenteric ischemia    Adjustment disorder    Adjustment disorder with depressed mood    Pulmonary thromboembolism    Atrial flutter    Acute systolic congestive heart failure    Abdominal pain    Encounter for palliative care    Therapeutic opioid induced constipation    Difficulty coping with disease    Heart failure    Advance care planning    Debility    Acute mesenteric ischemia    Septic shock    Limb ischemia    Poor appetite    Hemorrhagic shock    Acute systolic congestive heart failure    Atrial flutter    Mesenteric ischemia    GI bleed    Lower limb ischemia    Severe protein-calorie malnutrition    Difficulty coping with disease    Altered bowel elimination due to intestinal ostomy    Fever    Embolectomy, artery, superior mesenteric    Exploratory laparotomy    Exploratory laparotomy with small bowel resection    Small bowel resection with anastomosis    Closure of fascia of abdomen    Insertion, needle, vein    Atrial flutter    Acute systolic congestive heart failure    Single major depressive episode, moderate    Room Service Assist    Room Service Assist    Room Service Assist    [email or web address]        Meds:acetaminophen     Tablet .. 650 milliGRAM(s) Oral every 6 hours PRN  aMIOdarone    Tablet 100 milliGRAM(s) Oral every 24 hours  ascorbic acid 500 milliGRAM(s) Oral daily  chlorhexidine 2% Cloths 1 Application(s) Topical <User Schedule>  cholestyramine Powder (Sugar-Free) 2 Gram(s) Oral three times a day  DAPTOmycin IVPB 500 milliGRAM(s) IV Intermittent every 24 hours  DAPTOmycin IVPB      dextrose 5% + lactated ringers. 1000 milliLiter(s) IV Continuous <Continuous>  dextrose 5%. 1000 milliLiter(s) IV Continuous <Continuous>  dextrose 5%. 1000 milliLiter(s) IV Continuous <Continuous>  dextrose 5%. 1000 milliLiter(s) IV Continuous <Continuous>  dextrose 5%. 1000 milliLiter(s) IV Continuous <Continuous>  dextrose 50% Injectable 25 Gram(s) IV Push once  dextrose 50% Injectable 12.5 Gram(s) IV Push once  dextrose 50% Injectable 25 Gram(s) IV Push once  dextrose Oral Gel 15 Gram(s) Oral once PRN  dextrose Oral Gel 15 Gram(s) Oral once PRN  diphenoxylate/atropine 2 Tablet(s) Oral every 8 hours  ertapenem  IVPB 1000 milliGRAM(s) IV Intermittent every 24 hours  glucagon  Injectable 1 milliGRAM(s) IntraMuscular once  glucagon  Injectable 1 milliGRAM(s) IntraMuscular once  heparin  Infusion 1000 Unit(s)/Hr IV Continuous <Continuous>  influenza  Vaccine (HIGH DOSE) 0.7 milliLiter(s) IntraMuscular once  insulin lispro (ADMELOG) corrective regimen sliding scale   SubCutaneous every 6 hours  loperamide 4 milliGRAM(s) Oral every 8 hours  melatonin 3 milliGRAM(s) Oral at bedtime PRN  metoprolol succinate ER 25 milliGRAM(s) Oral daily  mirtazapine 30 milliGRAM(s) Oral at bedtime  multivitamin 1 Tablet(s) Oral daily  ondansetron Injectable 4 milliGRAM(s) IV Push every 6 hours PRN  opium Tincture 6 milliGRAM(s) Oral every 12 hours  pantoprazole    Tablet 40 milliGRAM(s) Oral two times a day  psyllium Powder 1 Packet(s) Oral every 8 hours  sacubitril 24 mG/valsartan 26 mG 1 Tablet(s) Oral two times a day  sodium chloride 0.9% lock flush 10 milliLiter(s) IV Push every 1 hour PRN  zinc sulfate 220 milliGRAM(s) Oral daily      Allergies:No Known Allergies        Labs:                           10.8   13.32 )-----------( 476      ( 13 Feb 2023 05:30 )             33.3     PT/INR - ( 13 Feb 2023 05:30 )   PT: 15.4 sec;   INR: 1.29          PTT - ( 13 Feb 2023 05:30 )  PTT:66.9 sec  02-13    134<L>  |  102  |  11  ----------------------------<  105<H>  3.9   |  26  |  0.66    Ca    8.9      13 Feb 2023 05:30  Phos  2.9     02-13  Mg     2.0     02-13            Imaging Findings: 7 x 5 cm collection right lower quadrant with an overlying contrast opacified bowel is noted and stable.

## 2023-02-13 NOTE — CONSULT NOTE ADULT - ASSESSMENT
Assessment: 77M with PMHx of IVDU found unresponsive at his nursing home, resolved after Narcan in the field and brought to [facility]. Found to have PE, atrial flutter, and large LV thrombus on echo. Transferred to [facility] for further management. Pt c/o abdominal pain on 12/10 CTA showing mid SMA with embolus. Abnormal distal small bowel loops and cecum with dilatation and pneumatosis suggesting infarcted bowel. One or two tiny foci of intrahepatic portal vein pneumatosis. Segmental infarction upper pole left kidney. Now s/p Ex lap, SMA embolectomy, 80cm SBR, abthera vac left in discontinuity (12/11) and transferred to SICU intubated. S/p second look (12/13) and most recently s/p OR for 3rd look, end-to-end anastomosis of remaining bowel, loop ileostomy and abdomen closure (12/15). IR consulted 1/27 for RLQ abdominal collection drainage, which was not amenable to percutaneous drainage. IR consulted 2/13 for reevaluation. Case reviewed with Dr. Zambrano, collection remains not amenable to percutaneous drainage d/t bowel obstructing safe window.      Communicated with: SICU resident

## 2023-02-13 NOTE — PROGRESS NOTE ADULT - ATTENDING COMMENTS
Initial attending contact date   2/13/23   . See fellow note written above for details. I reviewed the fellow documentation. I have personally seen and examined this patient. I reviewed vitals, labs, medications, cardiac studies, and additional imaging. I agree with the above fellow's findings and plans as written above with the following additions/statements.    -Pt with prolonged and complicated hospital course as noted  -On tele NSR with HR   -With multifactorial causes of baseline sinus tach including deconditioned state, fever, anemia etc   -sCHF: euvolemic on exam, cont toprol 25 mg qd, increase entresto 49/51 bid, cont chase 25 mg qd   -hx A flutter, no maintaining NSR. Cont IV heparin  -Pls reconsult as needed

## 2023-02-13 NOTE — PROGRESS NOTE ADULT - ASSESSMENT
ASSESSMENT:   77M Hx  cocaine/opiate abuse and resident of a nursing facility presented from [facility] after unresponsiveness alleviated w/ narcan, noted at that time to be in rapid atrial flutter w/ severe LV dysfunction and mobile LV thrombus as well as subsegemental PE and transferred to [facility] for further management. Course complicated by limb ischemia with arterial thrombosis of LLE vessels and subsequently developed abdominal pain and found to have acute mesenteric ischemia s/p emergent bowel resection and SMA thrombectomy. LV thrombus no longer apparent on TTE and has likely embolized. After prolonged open abdomen, septic shock and now s/p ileostomy. His course has been further complicated by hemodynamically significant GI bleeding. Of note patient, underwent LORENZO/DCCV on 12/30 and remains in sinus rhythm. Patient has been awaiting AKA with attempts to nutritionally optimize by primary team, for wound healing. Further complicated by intermittent fevers and significant ostomy output. Cardiology reconsulted for sinus tachycardia.    Cardiac studies:   TTE: LV 5.0 cm, LVEF 10-15% with global hypokinesis, 2.5 cm mobile LV thrombus, severe RV dysfunction  LORENZO: no ISIAH thrombus    PLAN  Tachycardia  Sinus tachycardia in the setting of fever and bacteremia   Resolved   - infectious treatment per primary team/ID  - No further cardiac intervention.    Acute Systolic Heart Failure  - Etiology: possible tachycardia induced and now s/p DCCV.. Pending ischemic evaluation as outpatient if LVEF remains reduced pending clinical status  Increase Entresto to moderate dose 49-51mg BID   - GDMT: Remains on metoprolol succinate 25mg qd and spironolactone 25mg qd. Continue to defer SGLT2i   - Diuretics: Dry on exam. No indication for diuretic.  - Device: will need to be further evaluated in future.     Atrial flutter  - s/p DCCV 12/30. Now in NSR   - CHADsVASC 3, continue anticoagulation currently on heparin gtt    - on amiodarone 100mg per EP, remains on Toprol 25     Acute limb ischemia and mesenteric ischemia from LV thrombus  - management per surgery/vascular, currently awaiting AKA as OP   - continue anticoagulation as above    reconsult prn

## 2023-02-13 NOTE — PROGRESS NOTE ADULT - SUBJECTIVE AND OBJECTIVE BOX
**Incomplete Note**  OVERNIGHT EVENTS:    SUBJECTIVE / INTERVAL HPI: Patient seen and examined at bedside. Denies f/c, n/v, HA, chest pain, SOB, abdominal pain, diarrhea, constipation, melena, hematochezia, hematuria, dysuria    MEDICATIONS  (STANDING):  aMIOdarone    Tablet 100 milliGRAM(s) Oral every 24 hours  ascorbic acid 500 milliGRAM(s) Oral daily  chlorhexidine 2% Cloths 1 Application(s) Topical <User Schedule>  cholestyramine Powder (Sugar-Free) 2 Gram(s) Oral three times a day  DAPTOmycin IVPB 500 milliGRAM(s) IV Intermittent every 24 hours  DAPTOmycin IVPB      dextrose 5% + lactated ringers. 1000 milliLiter(s) (100 mL/Hr) IV Continuous <Continuous>  dextrose 5%. 1000 milliLiter(s) (100 mL/Hr) IV Continuous <Continuous>  dextrose 5%. 1000 milliLiter(s) (50 mL/Hr) IV Continuous <Continuous>  dextrose 5%. 1000 milliLiter(s) (50 mL/Hr) IV Continuous <Continuous>  dextrose 5%. 1000 milliLiter(s) (100 mL/Hr) IV Continuous <Continuous>  dextrose 50% Injectable 25 Gram(s) IV Push once  dextrose 50% Injectable 12.5 Gram(s) IV Push once  dextrose 50% Injectable 25 Gram(s) IV Push once  diphenoxylate/atropine 2 Tablet(s) Oral every 8 hours  ertapenem  IVPB 1000 milliGRAM(s) IV Intermittent every 24 hours  glucagon  Injectable 1 milliGRAM(s) IntraMuscular once  glucagon  Injectable 1 milliGRAM(s) IntraMuscular once  heparin  Infusion 1000 Unit(s)/Hr (14 mL/Hr) IV Continuous <Continuous>  influenza  Vaccine (HIGH DOSE) 0.7 milliLiter(s) IntraMuscular once  insulin lispro (ADMELOG) corrective regimen sliding scale   SubCutaneous every 6 hours  loperamide 4 milliGRAM(s) Oral every 8 hours  metoprolol succinate ER 25 milliGRAM(s) Oral daily  mirtazapine 30 milliGRAM(s) Oral at bedtime  multivitamin 1 Tablet(s) Oral daily  opium Tincture 6 milliGRAM(s) Oral every 12 hours  pantoprazole    Tablet 40 milliGRAM(s) Oral two times a day  psyllium Powder 1 Packet(s) Oral every 8 hours  sacubitril 24 mG/valsartan 26 mG 1 Tablet(s) Oral two times a day  zinc sulfate 220 milliGRAM(s) Oral daily    MEDICATIONS  (PRN):  acetaminophen     Tablet .. 650 milliGRAM(s) Oral every 6 hours PRN Temp greater or equal to 38C (100.4F), Mild Pain (1 - 3)  dextrose Oral Gel 15 Gram(s) Oral once PRN Blood Glucose LESS THAN 70 milliGRAM(s)/deciliter  dextrose Oral Gel 15 Gram(s) Oral once PRN Blood Glucose LESS THAN 70 milliGRAM(s)/deciliter  melatonin 3 milliGRAM(s) Oral at bedtime PRN Insomnia  ondansetron Injectable 4 milliGRAM(s) IV Push every 6 hours PRN Nausea and/or Vomiting  sodium chloride 0.9% lock flush 10 milliLiter(s) IV Push every 1 hour PRN Pre/post blood products, medications, blood draw, and to maintain line patency    Allergies    No Known Allergies    Intolerances        VITAL SIGNS:  Vital Signs Last 24 Hrs  T(C): 37 (13 Feb 2023 05:36), Max: 38.5 (12 Feb 2023 21:00)  T(F): 98.6 (13 Feb 2023 05:36), Max: 101.3 (12 Feb 2023 21:00)  HR: 93 (13 Feb 2023 06:00) (87 - 110)  BP: 132/64 (13 Feb 2023 06:00) (81/52 - 142/68)  BP(mean): 82 (13 Feb 2023 06:00) (61 - 102)  RR: 25 (13 Feb 2023 06:00) (10 - 63)  SpO2: 100% (13 Feb 2023 06:00) (92% - 100%)    Parameters below as of 13 Feb 2023 06:00  Patient On (Oxygen Delivery Method): room air          02-12-23 @ 07:01  -  02-13-23 @ 07:00  --------------------------------------------------------  IN: 3703 mL / OUT: 2200 mL / NET: 1503 mL        PHYSICAL EXAM:  General: NAD, Laying comfortably in bed  HEENT: NC/AT, anicteric sclera, MMM  Neck: supple  Cardiovascular: +S1/S2, RRR, No murmurs, rubs, gallops  Respiratory: CTA B/L, no W/R/R  Gastrointestinal: soft, NT/ND, +BSx4  Extremities: WWP, no edema, clubbing or cyanosis  Vascular: 2+ radial, DP/PT pulses B/L  Neurological: AAOx3, no focal deficits      LABS:                        10.8   13.32 )-----------( 476      ( 13 Feb 2023 05:30 )             33.3     02-13    134<L>  |  102  |  11  ----------------------------<  105<H>  3.9   |  26  |  0.66    Ca    8.9      13 Feb 2023 05:30  Phos  2.9     02-13  Mg     2.0     02-13      PT/INR - ( 13 Feb 2023 05:30 )   PT: 15.4 sec;   INR: 1.29          PTT - ( 13 Feb 2023 05:30 )  PTT:66.9 sec  Urinalysis Basic - ( 11 Feb 2023 07:49 )    Color: Yellow / Appearance: Clear / SG: <=1.005 / pH: x  Gluc: x / Ketone: NEGATIVE  / Bili: Negative / Urobili: 0.2 E.U./dL   Blood: x / Protein: Trace mg/dL / Nitrite: NEGATIVE   Leuk Esterase: NEGATIVE / RBC: < 5 /HPF / WBC 5-10 /HPF   Sq Epi: x / Non Sq Epi: 0-5 /HPF / Bacteria: None /HPF      CAPILLARY BLOOD GLUCOSE      POCT Blood Glucose.: 108 mg/dL (12 Feb 2023 23:26)  POCT Blood Glucose.: 96 mg/dL (12 Feb 2023 18:48)  POCT Blood Glucose.: 93 mg/dL (12 Feb 2023 12:30)          RADIOLOGY & ADDITIONAL TESTS: Reviewed. **Incomplete Note**  OVERNIGHT EVENTS: Temp 101.3 at 9 pm 2/12, patient refused blood cultures being drawn.     SUBJECTIVE / INTERVAL HPI: Patient seen and examined at bedside. Denies f/c, n/v, HA, chest pain, SOB, abdominal pain, diarrhea, constipation, melena, hematochezia, hematuria, dysuria    MEDICATIONS  (STANDING):  aMIOdarone    Tablet 100 milliGRAM(s) Oral every 24 hours  ascorbic acid 500 milliGRAM(s) Oral daily  chlorhexidine 2% Cloths 1 Application(s) Topical <User Schedule>  cholestyramine Powder (Sugar-Free) 2 Gram(s) Oral three times a day  DAPTOmycin IVPB 500 milliGRAM(s) IV Intermittent every 24 hours  DAPTOmycin IVPB      dextrose 5% + lactated ringers. 1000 milliLiter(s) (100 mL/Hr) IV Continuous <Continuous>  dextrose 5%. 1000 milliLiter(s) (100 mL/Hr) IV Continuous <Continuous>  dextrose 5%. 1000 milliLiter(s) (50 mL/Hr) IV Continuous <Continuous>  dextrose 5%. 1000 milliLiter(s) (50 mL/Hr) IV Continuous <Continuous>  dextrose 5%. 1000 milliLiter(s) (100 mL/Hr) IV Continuous <Continuous>  dextrose 50% Injectable 25 Gram(s) IV Push once  dextrose 50% Injectable 12.5 Gram(s) IV Push once  dextrose 50% Injectable 25 Gram(s) IV Push once  diphenoxylate/atropine 2 Tablet(s) Oral every 8 hours  ertapenem  IVPB 1000 milliGRAM(s) IV Intermittent every 24 hours  glucagon  Injectable 1 milliGRAM(s) IntraMuscular once  glucagon  Injectable 1 milliGRAM(s) IntraMuscular once  heparin  Infusion 1000 Unit(s)/Hr (14 mL/Hr) IV Continuous <Continuous>  influenza  Vaccine (HIGH DOSE) 0.7 milliLiter(s) IntraMuscular once  insulin lispro (ADMELOG) corrective regimen sliding scale   SubCutaneous every 6 hours  loperamide 4 milliGRAM(s) Oral every 8 hours  metoprolol succinate ER 25 milliGRAM(s) Oral daily  mirtazapine 30 milliGRAM(s) Oral at bedtime  multivitamin 1 Tablet(s) Oral daily  opium Tincture 6 milliGRAM(s) Oral every 12 hours  pantoprazole    Tablet 40 milliGRAM(s) Oral two times a day  psyllium Powder 1 Packet(s) Oral every 8 hours  sacubitril 24 mG/valsartan 26 mG 1 Tablet(s) Oral two times a day  zinc sulfate 220 milliGRAM(s) Oral daily    MEDICATIONS  (PRN):  acetaminophen     Tablet .. 650 milliGRAM(s) Oral every 6 hours PRN Temp greater or equal to 38C (100.4F), Mild Pain (1 - 3)  dextrose Oral Gel 15 Gram(s) Oral once PRN Blood Glucose LESS THAN 70 milliGRAM(s)/deciliter  dextrose Oral Gel 15 Gram(s) Oral once PRN Blood Glucose LESS THAN 70 milliGRAM(s)/deciliter  melatonin 3 milliGRAM(s) Oral at bedtime PRN Insomnia  ondansetron Injectable 4 milliGRAM(s) IV Push every 6 hours PRN Nausea and/or Vomiting  sodium chloride 0.9% lock flush 10 milliLiter(s) IV Push every 1 hour PRN Pre/post blood products, medications, blood draw, and to maintain line patency    Allergies    No Known Allergies    Intolerances        VITAL SIGNS:  Vital Signs Last 24 Hrs  T(C): 37 (13 Feb 2023 05:36), Max: 38.5 (12 Feb 2023 21:00)  T(F): 98.6 (13 Feb 2023 05:36), Max: 101.3 (12 Feb 2023 21:00)  HR: 93 (13 Feb 2023 06:00) (87 - 110)  BP: 132/64 (13 Feb 2023 06:00) (81/52 - 142/68)  BP(mean): 82 (13 Feb 2023 06:00) (61 - 102)  RR: 25 (13 Feb 2023 06:00) (10 - 63)  SpO2: 100% (13 Feb 2023 06:00) (92% - 100%)    Parameters below as of 13 Feb 2023 06:00  Patient On (Oxygen Delivery Method): room air          02-12-23 @ 07:01  -  02-13-23 @ 07:00  --------------------------------------------------------  IN: 3703 mL / OUT: 2200 mL / NET: 1503 mL        PHYSICAL EXAM:  Constitutional: awake, NAD  Eyes: the sclera and conjunctiva were normal.   ENT: the ears and nose were normal in appearance.   Pulmonary: no respiratory distress and lungs were clear to auscultation bilaterally.   Heart: heart rate was normal and rhythm regular, normal S1 and S2  Vascular:. there was no peripheral edema  Abdomen: normal bowel sounds, soft, ileostomy bag  Neurological: appropriately answering questions      LABS:                        10.8   13.32 )-----------( 476      ( 13 Feb 2023 05:30 )             33.3     02-13    134<L>  |  102  |  11  ----------------------------<  105<H>  3.9   |  26  |  0.66    Ca    8.9      13 Feb 2023 05:30  Phos  2.9     02-13  Mg     2.0     02-13      PT/INR - ( 13 Feb 2023 05:30 )   PT: 15.4 sec;   INR: 1.29          PTT - ( 13 Feb 2023 05:30 )  PTT:66.9 sec  Urinalysis Basic - ( 11 Feb 2023 07:49 )    Color: Yellow / Appearance: Clear / SG: <=1.005 / pH: x  Gluc: x / Ketone: NEGATIVE  / Bili: Negative / Urobili: 0.2 E.U./dL   Blood: x / Protein: Trace mg/dL / Nitrite: NEGATIVE   Leuk Esterase: NEGATIVE / RBC: < 5 /HPF / WBC 5-10 /HPF   Sq Epi: x / Non Sq Epi: 0-5 /HPF / Bacteria: None /HPF      CAPILLARY BLOOD GLUCOSE      POCT Blood Glucose.: 108 mg/dL (12 Feb 2023 23:26)  POCT Blood Glucose.: 96 mg/dL (12 Feb 2023 18:48)  POCT Blood Glucose.: 93 mg/dL (12 Feb 2023 12:30)          RADIOLOGY & ADDITIONAL TESTS: Reviewed. OVERNIGHT EVENTS: Temp 101.3 at 9 pm 2/12, patient refused blood cultures being drawn.     SUBJECTIVE / INTERVAL HPI: Patient seen and examined at bedside. Denies f/c, n/v, HA, chest pain, SOB, abdominal pain, diarrhea, constipation, melena, hematochezia, hematuria, dysuria    MEDICATIONS  (STANDING):  aMIOdarone    Tablet 100 milliGRAM(s) Oral every 24 hours  ascorbic acid 500 milliGRAM(s) Oral daily  chlorhexidine 2% Cloths 1 Application(s) Topical <User Schedule>  cholestyramine Powder (Sugar-Free) 2 Gram(s) Oral three times a day  DAPTOmycin IVPB 500 milliGRAM(s) IV Intermittent every 24 hours  DAPTOmycin IVPB      dextrose 5% + lactated ringers. 1000 milliLiter(s) (100 mL/Hr) IV Continuous <Continuous>  dextrose 5%. 1000 milliLiter(s) (100 mL/Hr) IV Continuous <Continuous>  dextrose 5%. 1000 milliLiter(s) (50 mL/Hr) IV Continuous <Continuous>  dextrose 5%. 1000 milliLiter(s) (50 mL/Hr) IV Continuous <Continuous>  dextrose 5%. 1000 milliLiter(s) (100 mL/Hr) IV Continuous <Continuous>  dextrose 50% Injectable 25 Gram(s) IV Push once  dextrose 50% Injectable 12.5 Gram(s) IV Push once  dextrose 50% Injectable 25 Gram(s) IV Push once  diphenoxylate/atropine 2 Tablet(s) Oral every 8 hours  ertapenem  IVPB 1000 milliGRAM(s) IV Intermittent every 24 hours  glucagon  Injectable 1 milliGRAM(s) IntraMuscular once  glucagon  Injectable 1 milliGRAM(s) IntraMuscular once  heparin  Infusion 1000 Unit(s)/Hr (14 mL/Hr) IV Continuous <Continuous>  influenza  Vaccine (HIGH DOSE) 0.7 milliLiter(s) IntraMuscular once  insulin lispro (ADMELOG) corrective regimen sliding scale   SubCutaneous every 6 hours  loperamide 4 milliGRAM(s) Oral every 8 hours  metoprolol succinate ER 25 milliGRAM(s) Oral daily  mirtazapine 30 milliGRAM(s) Oral at bedtime  multivitamin 1 Tablet(s) Oral daily  opium Tincture 6 milliGRAM(s) Oral every 12 hours  pantoprazole    Tablet 40 milliGRAM(s) Oral two times a day  psyllium Powder 1 Packet(s) Oral every 8 hours  sacubitril 24 mG/valsartan 26 mG 1 Tablet(s) Oral two times a day  zinc sulfate 220 milliGRAM(s) Oral daily    MEDICATIONS  (PRN):  acetaminophen     Tablet .. 650 milliGRAM(s) Oral every 6 hours PRN Temp greater or equal to 38C (100.4F), Mild Pain (1 - 3)  dextrose Oral Gel 15 Gram(s) Oral once PRN Blood Glucose LESS THAN 70 milliGRAM(s)/deciliter  dextrose Oral Gel 15 Gram(s) Oral once PRN Blood Glucose LESS THAN 70 milliGRAM(s)/deciliter  melatonin 3 milliGRAM(s) Oral at bedtime PRN Insomnia  ondansetron Injectable 4 milliGRAM(s) IV Push every 6 hours PRN Nausea and/or Vomiting  sodium chloride 0.9% lock flush 10 milliLiter(s) IV Push every 1 hour PRN Pre/post blood products, medications, blood draw, and to maintain line patency    Allergies    No Known Allergies    Intolerances        VITAL SIGNS:  Vital Signs Last 24 Hrs  T(C): 37 (13 Feb 2023 05:36), Max: 38.5 (12 Feb 2023 21:00)  T(F): 98.6 (13 Feb 2023 05:36), Max: 101.3 (12 Feb 2023 21:00)  HR: 93 (13 Feb 2023 06:00) (87 - 110)  BP: 132/64 (13 Feb 2023 06:00) (81/52 - 142/68)  BP(mean): 82 (13 Feb 2023 06:00) (61 - 102)  RR: 25 (13 Feb 2023 06:00) (10 - 63)  SpO2: 100% (13 Feb 2023 06:00) (92% - 100%)    Parameters below as of 13 Feb 2023 06:00  Patient On (Oxygen Delivery Method): room air          02-12-23 @ 07:01  -  02-13-23 @ 07:00  --------------------------------------------------------  IN: 3703 mL / OUT: 2200 mL / NET: 1503 mL        PHYSICAL EXAM:  Constitutional: awake, NAD  Eyes: the sclera and conjunctiva were normal.   ENT: the ears and nose were normal in appearance.   Pulmonary: no respiratory distress and lungs were clear to auscultation bilaterally.   Heart: heart rate was normal and rhythm regular, normal S1 and S2  Vascular:. there was no peripheral edema  Abdomen: normal bowel sounds, soft, ileostomy bag  Neurological: appropriately answering questions      LABS:                        10.8   13.32 )-----------( 476      ( 13 Feb 2023 05:30 )             33.3     02-13    134<L>  |  102  |  11  ----------------------------<  105<H>  3.9   |  26  |  0.66    Ca    8.9      13 Feb 2023 05:30  Phos  2.9     02-13  Mg     2.0     02-13      PT/INR - ( 13 Feb 2023 05:30 )   PT: 15.4 sec;   INR: 1.29          PTT - ( 13 Feb 2023 05:30 )  PTT:66.9 sec  Urinalysis Basic - ( 11 Feb 2023 07:49 )    Color: Yellow / Appearance: Clear / SG: <=1.005 / pH: x  Gluc: x / Ketone: NEGATIVE  / Bili: Negative / Urobili: 0.2 E.U./dL   Blood: x / Protein: Trace mg/dL / Nitrite: NEGATIVE   Leuk Esterase: NEGATIVE / RBC: < 5 /HPF / WBC 5-10 /HPF   Sq Epi: x / Non Sq Epi: 0-5 /HPF / Bacteria: None /HPF      CAPILLARY BLOOD GLUCOSE      POCT Blood Glucose.: 108 mg/dL (12 Feb 2023 23:26)  POCT Blood Glucose.: 96 mg/dL (12 Feb 2023 18:48)  POCT Blood Glucose.: 93 mg/dL (12 Feb 2023 12:30)          RADIOLOGY & ADDITIONAL TESTS: Reviewed.

## 2023-02-13 NOTE — PROGRESS NOTE ADULT - SUBJECTIVE AND OBJECTIVE BOX
Cardiology Consult Service Progress Note     Eugenio Jean Baptiste MD [facility]  Cardiology Fellow   Pager [phone]    Patient is a 78y old  Male who presents with a chief complaint of A flutter (13 Feb 2023 12:41)    SUBJECTIVE/OVERNIGHT EVENTS   No acute events overnight.    No subjective complaints   Somnolent     OBJECTIVE  Vital Signs Last 24 Hrs  T(C): 37.2 (13 Feb 2023 11:00), Max: 38.5 (12 Feb 2023 21:00)  T(F): 98.9 (13 Feb 2023 11:00), Max: 101.3 (12 Feb 2023 21:00)  HR: 124 (13 Feb 2023 14:25) (84 - 124)  BP: 154/88 (13 Feb 2023 14:00) (81/52 - 155/71)  BP(mean): 116 (13 Feb 2023 14:00) (61 - 116)  RR: 24 (13 Feb 2023 14:00) (10 - 36)  SpO2: 95% (13 Feb 2023 14:00) (85% - 100%)    Parameters below as of 13 Feb 2023 14:00  Patient On (Oxygen Delivery Method): room air    I&O's Summary    12 Feb 2023 07:01  -  13 Feb 2023 07:00  --------------------------------------------------------  IN: 3931 mL / OUT: 2850 mL / NET: 1081 mL    13 Feb 2023 07:01  -  13 Feb 2023 15:32  --------------------------------------------------------  IN: 899 mL / OUT: 0 mL / NET: 899 mL    PHYSICAL EXAM:  GENERAL: NAD  HEAD:  Atraumatic, Normocephalic  EYES: EOMI, conjunctiva and sclera clear  NECK: Supple, No JVD  CHEST/LUNG: Clear to auscultation bilaterally; No wheeze  HEART: Regular rate and rhythm; no murmurs   ABDOMEN: Soft, Nontender, Nondistended; Bowel sounds present, +ostomy   EXTREMITIES:  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  PSYCH: AAOx3   NEUROLOGY: non-focal    LABS                        10.8   13.32 )-----------( 476      ( 13 Feb 2023 05:30 )             33.3                         9.9    14.76 )-----------( 502      ( 12 Feb 2023 05:23 )             30.5     02-13    134<L>  |  102  |  11  ----------------------------<  105<H>  3.9   |  26  |  0.66  02-12    133<L>  |  100  |  18  ----------------------------<  105<H>  4.3   |  25  |  0.62    Ca    8.9      13 Feb 2023 05:30  Ca    8.4      12 Feb 2023 05:23  Phos  2.9     02-13  Mg     2.0     02-13    CAPILLARY BLOOD GLUCOSE  POCT Blood Glucose.: 114 mg/dL (13 Feb 2023 12:44)  POCT Blood Glucose.: 108 mg/dL (12 Feb 2023 23:26)  POCT Blood Glucose.: 96 mg/dL (12 Feb 2023 18:48)    PT/INR - ( 13 Feb 2023 05:30 )   PT: 15.4 sec;   INR: 1.29     PTT - ( 13 Feb 2023 09:54 )  PTT:80.1 sec   11 Feb 2023 05:30 Troponin x     /  U/L / CKMB x     / CKMB Units x        ( 10 Feb 2023 13:28 ) pH: 7.54  /  pCO2: 26    /  pO2: 201   / HCO3: 22    / Base Excess: 1.0   /  SaO2: 99.2      RADIOLOGY & ADDITIONAL TESTS:    MEDICATIONS  (STANDING):  aMIOdarone    Tablet 100 milliGRAM(s) Oral every 24 hours  ascorbic acid 500 milliGRAM(s) Oral daily  chlorhexidine 2% Cloths 1 Application(s) Topical <User Schedule>  cholestyramine Powder (Sugar-Free) 2 Gram(s) Oral three times a day  DAPTOmycin IVPB      DAPTOmycin IVPB 500 milliGRAM(s) IV Intermittent every 24 hours  dextrose 5% + lactated ringers. 1000 milliLiter(s) (100 mL/Hr) IV Continuous <Continuous>  dextrose 5%. 1000 milliLiter(s) (100 mL/Hr) IV Continuous <Continuous>  dextrose 5%. 1000 milliLiter(s) (50 mL/Hr) IV Continuous <Continuous>  dextrose 5%. 1000 milliLiter(s) (50 mL/Hr) IV Continuous <Continuous>  dextrose 5%. 1000 milliLiter(s) (100 mL/Hr) IV Continuous <Continuous>  dextrose 50% Injectable 25 Gram(s) IV Push once  dextrose 50% Injectable 12.5 Gram(s) IV Push once  dextrose 50% Injectable 25 Gram(s) IV Push once  diphenoxylate/atropine 2 Tablet(s) Oral every 8 hours  ertapenem  IVPB 1000 milliGRAM(s) IV Intermittent every 24 hours  glucagon  Injectable 1 milliGRAM(s) IntraMuscular once  glucagon  Injectable 1 milliGRAM(s) IntraMuscular once  heparin  Infusion 1000 Unit(s)/Hr (14 mL/Hr) IV Continuous <Continuous>  influenza  Vaccine (HIGH DOSE) 0.7 milliLiter(s) IntraMuscular once  insulin lispro (ADMELOG) corrective regimen sliding scale   SubCutaneous every 6 hours  loperamide 4 milliGRAM(s) Oral every 8 hours  metoprolol succinate ER 25 milliGRAM(s) Oral daily  mirtazapine 30 milliGRAM(s) Oral at bedtime  multivitamin 1 Tablet(s) Oral daily  opium Tincture 6 milliGRAM(s) Oral every 12 hours  pantoprazole    Tablet 40 milliGRAM(s) Oral two times a day  psyllium Powder 1 Packet(s) Oral every 8 hours  sacubitril 24 mG/valsartan 26 mG 1 Tablet(s) Oral two times a day  zinc sulfate 220 milliGRAM(s) Oral daily    MEDICATIONS  (PRN):  acetaminophen     Tablet .. 650 milliGRAM(s) Oral every 6 hours PRN Temp greater or equal to 38C (100.4F), Mild Pain (1 - 3)  dextrose Oral Gel 15 Gram(s) Oral once PRN Blood Glucose LESS THAN 70 milliGRAM(s)/deciliter  dextrose Oral Gel 15 Gram(s) Oral once PRN Blood Glucose LESS THAN 70 milliGRAM(s)/deciliter  melatonin 3 milliGRAM(s) Oral at bedtime PRN Insomnia  ondansetron Injectable 4 milliGRAM(s) IV Push every 6 hours PRN Nausea and/or Vomiting  sodium chloride 0.9% lock flush 10 milliLiter(s) IV Push every 1 hour PRN Pre/post blood products, medications, blood draw, and to maintain line patency

## 2023-02-13 NOTE — PROGRESS NOTE ADULT - SUBJECTIVE AND OBJECTIVE BOX
24 hr events: : Fever 102, Blood cx refused, PTT sub theraputic increased. Discussed with IR, no knowledge of drainage plan- continue hep gtt and check in am. Bolus 500cc for ostomy and soft SBP    SUBJECTIVE: Patient seen at bedside in no acute distress. In poor spirits. No CP, SOB, fevers or chills.    Vital Signs Last 24 Hrs  T(C): 38.1 (13 Feb 2023 19:46), Max: 38.5 (12 Feb 2023 21:00)  T(F): 100.5 (13 Feb 2023 19:46), Max: 101.3 (12 Feb 2023 21:00)  HR: 110 (13 Feb 2023 20:00) (84 - 124)  BP: 139/77 (13 Feb 2023 20:00) (81/52 - 155/71)  BP(mean): 97 (13 Feb 2023 20:00) (61 - 116)  RR: 38 (13 Feb 2023 20:00) (9 - 38)  SpO2: 100% (13 Feb 2023 20:00) (85% - 100%)    Parameters below as of 13 Feb 2023 20:00  Patient On (Oxygen Delivery Method): room air        Physical Exam:  General: NAD, resting comfortably in bed  HEENT: NC/AT, EOMI, PERRLA  Pulmonary: Satting well on RA, b/l breath sounds, no r/r/w  Cardiovascular: S1 s2, NSR, no m/r/g  Abdominal: Soft, NTND, ostomy with yellow liquid stool   Extremities: Left leg dry gangrene extending from toes to just below the knee, dressed with kerlix  Neuro: AAOx3    Lines/drains/tubes:    I&O's Summary    12 Feb 2023 07:01  -  13 Feb 2023 07:00  --------------------------------------------------------  IN: 3931 mL / OUT: 2850 mL / NET: 1081 mL    13 Feb 2023 07:01  -  13 Feb 2023 20:51  --------------------------------------------------------  IN: 2069 mL / OUT: 1780 mL / NET: 289 mL        LABS:                        10.8   13.32 )-----------( 476      ( 13 Feb 2023 05:30 )             33.3     02-13    134<L>  |  102  |  11  ----------------------------<  105<H>  3.9   |  26  |  0.66    Ca    8.9      13 Feb 2023 05:30  Phos  2.9     02-13  Mg     2.0     02-13      PT/INR - ( 13 Feb 2023 05:30 )   PT: 15.4 sec;   INR: 1.29          PTT - ( 13 Feb 2023 17:23 )  PTT:66.4 sec    CAPILLARY BLOOD GLUCOSE      POCT Blood Glucose.: 99 mg/dL (13 Feb 2023 17:56)  POCT Blood Glucose.: 114 mg/dL (13 Feb 2023 12:44)  POCT Blood Glucose.: 108 mg/dL (12 Feb 2023 23:26)        RADIOLOGY & ADDITIONAL STUDIES:

## 2023-02-13 NOTE — PROGRESS NOTE ADULT - SUBJECTIVE AND OBJECTIVE BOX
ON: Fever 102, Blood cx refused, PTT sub theraputic increased. Discussed with IR, no knowledge of drainage plan- continue hep gtt and check in am. Bolus 500cc for ostomy and soft SBP  2/12: aPTT 42, hep gtt increased by 1, started D5LR@150/hr. CT A/P pending, 2.10 Surv BCx + Klebsiella + E. Faecium, william d/c'ed, Erta 1g q24hr started per ID. 3pm PTT 38.8, gtt increased to 13.    SUBJECTIVE:     MEDICATIONS  (STANDING):  aMIOdarone    Tablet 100 milliGRAM(s) Oral every 24 hours  ascorbic acid 500 milliGRAM(s) Oral daily  chlorhexidine 2% Cloths 1 Application(s) Topical <User Schedule>  cholestyramine Powder (Sugar-Free) 2 Gram(s) Oral three times a day  DAPTOmycin IVPB      DAPTOmycin IVPB 500 milliGRAM(s) IV Intermittent every 24 hours  dextrose 5% + lactated ringers. 1000 milliLiter(s) (100 mL/Hr) IV Continuous <Continuous>  dextrose 5%. 1000 milliLiter(s) (100 mL/Hr) IV Continuous <Continuous>  dextrose 5%. 1000 milliLiter(s) (50 mL/Hr) IV Continuous <Continuous>  dextrose 5%. 1000 milliLiter(s) (50 mL/Hr) IV Continuous <Continuous>  dextrose 5%. 1000 milliLiter(s) (100 mL/Hr) IV Continuous <Continuous>  dextrose 50% Injectable 25 Gram(s) IV Push once  dextrose 50% Injectable 12.5 Gram(s) IV Push once  dextrose 50% Injectable 25 Gram(s) IV Push once  diphenoxylate/atropine 2 Tablet(s) Oral every 8 hours  ertapenem  IVPB 1000 milliGRAM(s) IV Intermittent every 24 hours  glucagon  Injectable 1 milliGRAM(s) IntraMuscular once  glucagon  Injectable 1 milliGRAM(s) IntraMuscular once  heparin  Infusion 1000 Unit(s)/Hr (14 mL/Hr) IV Continuous <Continuous>  influenza  Vaccine (HIGH DOSE) 0.7 milliLiter(s) IntraMuscular once  insulin lispro (ADMELOG) corrective regimen sliding scale   SubCutaneous every 6 hours  loperamide 4 milliGRAM(s) Oral every 8 hours  metoprolol succinate ER 25 milliGRAM(s) Oral daily  mirtazapine 30 milliGRAM(s) Oral at bedtime  multivitamin 1 Tablet(s) Oral daily  opium Tincture 6 milliGRAM(s) Oral every 12 hours  pantoprazole    Tablet 40 milliGRAM(s) Oral two times a day  psyllium Powder 1 Packet(s) Oral every 8 hours  sacubitril 24 mG/valsartan 26 mG 1 Tablet(s) Oral two times a day  zinc sulfate 220 milliGRAM(s) Oral daily    MEDICATIONS  (PRN):  acetaminophen     Tablet .. 650 milliGRAM(s) Oral every 6 hours PRN Temp greater or equal to 38C (100.4F), Mild Pain (1 - 3)  dextrose Oral Gel 15 Gram(s) Oral once PRN Blood Glucose LESS THAN 70 milliGRAM(s)/deciliter  dextrose Oral Gel 15 Gram(s) Oral once PRN Blood Glucose LESS THAN 70 milliGRAM(s)/deciliter  melatonin 3 milliGRAM(s) Oral at bedtime PRN Insomnia  ondansetron Injectable 4 milliGRAM(s) IV Push every 6 hours PRN Nausea and/or Vomiting  sodium chloride 0.9% lock flush 10 milliLiter(s) IV Push every 1 hour PRN Pre/post blood products, medications, blood draw, and to maintain line patency      Drips:     ICU Vital Signs Last 24 Hrs  T(C): 37 (13 Feb 2023 05:36), Max: 38.5 (12 Feb 2023 21:00)  T(F): 98.6 (13 Feb 2023 05:36), Max: 101.3 (12 Feb 2023 21:00)  HR: 93 (13 Feb 2023 06:00) (87 - 110)  BP: 132/64 (13 Feb 2023 06:00) (81/52 - 142/68)  BP(mean): 82 (13 Feb 2023 06:00) (61 - 102)  ABP: --  ABP(mean): --  RR: 25 (13 Feb 2023 06:00) (10 - 63)  SpO2: 100% (13 Feb 2023 06:00) (92% - 100%)    O2 Parameters below as of 13 Feb 2023 06:00  Patient On (Oxygen Delivery Method): room air            Physical Exam:  General: NAD  HEENT: NC/AT, EOMI, PERRLA, normal hearing, no oral lesions, neck supple w/o LAD  Pulmonary: Nonlabored breathing, no respiratory distress, CTA-B  Cardiovascular: NSR, no murmurs  Abdominal: soft, NT/ND, +BS, no organomegaly  Extremities: WWP, 5/5 strength x 4, no clubbing/cyanosis/edema  Neuro: A/O x3, CNs II-XII grossly intact, normal motor/sensation, no focal deficits  Pulses: palpable distal pulses    Lines/tubes/drains:  Uriarte:	      Vent settings:      I&O's Summary    12 Feb 2023 07:01  -  13 Feb 2023 07:00  --------------------------------------------------------  IN: 3703 mL / OUT: 2200 mL / NET: 1503 mL        LABS:                        10.8   13.32 )-----------( 476      ( 13 Feb 2023 05:30 )             33.3     02-13    134<L>  |  102  |  11  ----------------------------<  105<H>  3.9   |  26  |  0.66    Ca    8.9      13 Feb 2023 05:30  Phos  2.9     02-13  Mg     2.0     02-13      PT/INR - ( 13 Feb 2023 05:30 )   PT: 15.4 sec;   INR: 1.29          PTT - ( 13 Feb 2023 05:30 )  PTT:66.9 sec  Urinalysis Basic - ( 11 Feb 2023 07:49 )    Color: Yellow / Appearance: Clear / SG: <=1.005 / pH: x  Gluc: x / Ketone: NEGATIVE  / Bili: Negative / Urobili: 0.2 E.U./dL   Blood: x / Protein: Trace mg/dL / Nitrite: NEGATIVE   Leuk Esterase: NEGATIVE / RBC: < 5 /HPF / WBC 5-10 /HPF   Sq Epi: x / Non Sq Epi: 0-5 /HPF / Bacteria: None /HPF      CAPILLARY BLOOD GLUCOSE      POCT Blood Glucose.: 108 mg/dL (12 Feb 2023 23:26)  POCT Blood Glucose.: 96 mg/dL (12 Feb 2023 18:48)  POCT Blood Glucose.: 93 mg/dL (12 Feb 2023 12:30)        Cultures:Culture Results:   No growth at 1 day. (02-11 @ 14:47)      RADIOLOGY & ADDITIONAL STUDIES:     ON: Fever 102, Blood cx refused, PTT sub theraputic increased. Discussed with IR, no knowledge of drainage plan- continue hep gtt and check in am. Bolus 500cc for ostomy and soft SBP  2/12: aPTT 42, hep gtt increased by 1, started D5LR@150/hr. CT A/P pending, 2.10 Surv BCx + Klebsiella + E. Faecium, william d/c'ed, Erta 1g q24hr started per ID. 3pm PTT 38.8, gtt increased to 13.    SUBJECTIVE: Laying comfortably in bed, asking for something to eat or drink, otherwise no complaints.    MEDICATIONS  (STANDING):  aMIOdarone    Tablet 100 milliGRAM(s) Oral every 24 hours  ascorbic acid 500 milliGRAM(s) Oral daily  chlorhexidine 2% Cloths 1 Application(s) Topical <User Schedule>  cholestyramine Powder (Sugar-Free) 2 Gram(s) Oral three times a day  DAPTOmycin IVPB      DAPTOmycin IVPB 500 milliGRAM(s) IV Intermittent every 24 hours  dextrose 5% + lactated ringers. 1000 milliLiter(s) (100 mL/Hr) IV Continuous <Continuous>  dextrose 5%. 1000 milliLiter(s) (100 mL/Hr) IV Continuous <Continuous>  dextrose 5%. 1000 milliLiter(s) (50 mL/Hr) IV Continuous <Continuous>  dextrose 5%. 1000 milliLiter(s) (50 mL/Hr) IV Continuous <Continuous>  dextrose 5%. 1000 milliLiter(s) (100 mL/Hr) IV Continuous <Continuous>  dextrose 50% Injectable 25 Gram(s) IV Push once  dextrose 50% Injectable 12.5 Gram(s) IV Push once  dextrose 50% Injectable 25 Gram(s) IV Push once  diphenoxylate/atropine 2 Tablet(s) Oral every 8 hours  ertapenem  IVPB 1000 milliGRAM(s) IV Intermittent every 24 hours  glucagon  Injectable 1 milliGRAM(s) IntraMuscular once  glucagon  Injectable 1 milliGRAM(s) IntraMuscular once  heparin  Infusion 1000 Unit(s)/Hr (14 mL/Hr) IV Continuous <Continuous>  influenza  Vaccine (HIGH DOSE) 0.7 milliLiter(s) IntraMuscular once  insulin lispro (ADMELOG) corrective regimen sliding scale   SubCutaneous every 6 hours  loperamide 4 milliGRAM(s) Oral every 8 hours  metoprolol succinate ER 25 milliGRAM(s) Oral daily  mirtazapine 30 milliGRAM(s) Oral at bedtime  multivitamin 1 Tablet(s) Oral daily  opium Tincture 6 milliGRAM(s) Oral every 12 hours  pantoprazole    Tablet 40 milliGRAM(s) Oral two times a day  psyllium Powder 1 Packet(s) Oral every 8 hours  sacubitril 24 mG/valsartan 26 mG 1 Tablet(s) Oral two times a day  zinc sulfate 220 milliGRAM(s) Oral daily    MEDICATIONS  (PRN):  acetaminophen     Tablet .. 650 milliGRAM(s) Oral every 6 hours PRN Temp greater or equal to 38C (100.4F), Mild Pain (1 - 3)  dextrose Oral Gel 15 Gram(s) Oral once PRN Blood Glucose LESS THAN 70 milliGRAM(s)/deciliter  dextrose Oral Gel 15 Gram(s) Oral once PRN Blood Glucose LESS THAN 70 milliGRAM(s)/deciliter  melatonin 3 milliGRAM(s) Oral at bedtime PRN Insomnia  ondansetron Injectable 4 milliGRAM(s) IV Push every 6 hours PRN Nausea and/or Vomiting  sodium chloride 0.9% lock flush 10 milliLiter(s) IV Push every 1 hour PRN Pre/post blood products, medications, blood draw, and to maintain line patency      Drips:     ICU Vital Signs Last 24 Hrs  T(C): 37 (13 Feb 2023 05:36), Max: 38.5 (12 Feb 2023 21:00)  T(F): 98.6 (13 Feb 2023 05:36), Max: 101.3 (12 Feb 2023 21:00)  HR: 93 (13 Feb 2023 06:00) (87 - 110)  BP: 132/64 (13 Feb 2023 06:00) (81/52 - 142/68)  BP(mean): 82 (13 Feb 2023 06:00) (61 - 102)  ABP: --  ABP(mean): --  RR: 25 (13 Feb 2023 06:00) (10 - 63)  SpO2: 100% (13 Feb 2023 06:00) (92% - 100%)    O2 Parameters below as of 13 Feb 2023 06:00  Patient On (Oxygen Delivery Method): room air            Physical Exam:    General: NAD, resting comfortably in bed  HEENT: NC/AT, EOMI, PERRLA  Pulmonary: Satting well on RA, b/l breath sounds, no r/r/w  Cardiovascular: S1 s2, NSR, no m/r/g  Abdominal: Soft, NTND, ostomy with yellow liquid stool   Extremities: Left leg dry gangrene extending from toes to just below the knee, dressed with kerlix  Neuro: AAOx3      Lines/tubes/drains:  Uriarte:	      Vent settings:      I&O's Summary    12 Feb 2023 07:01  -  13 Feb 2023 07:00  --------------------------------------------------------  IN: 3703 mL / OUT: 2200 mL / NET: 1503 mL        LABS:                        10.8   13.32 )-----------( 476      ( 13 Feb 2023 05:30 )             33.3     02-13    134<L>  |  102  |  11  ----------------------------<  105<H>  3.9   |  26  |  0.66    Ca    8.9      13 Feb 2023 05:30  Phos  2.9     02-13  Mg     2.0     02-13      PT/INR - ( 13 Feb 2023 05:30 )   PT: 15.4 sec;   INR: 1.29          PTT - ( 13 Feb 2023 05:30 )  PTT:66.9 sec  Urinalysis Basic - ( 11 Feb 2023 07:49 )    Color: Yellow / Appearance: Clear / SG: <=1.005 / pH: x  Gluc: x / Ketone: NEGATIVE  / Bili: Negative / Urobili: 0.2 E.U./dL   Blood: x / Protein: Trace mg/dL / Nitrite: NEGATIVE   Leuk Esterase: NEGATIVE / RBC: < 5 /HPF / WBC 5-10 /HPF   Sq Epi: x / Non Sq Epi: 0-5 /HPF / Bacteria: None /HPF      CAPILLARY BLOOD GLUCOSE      POCT Blood Glucose.: 108 mg/dL (12 Feb 2023 23:26)  POCT Blood Glucose.: 96 mg/dL (12 Feb 2023 18:48)  POCT Blood Glucose.: 93 mg/dL (12 Feb 2023 12:30)        Cultures:Culture Results:   No growth at 1 day. (02-11 @ 14:47)      RADIOLOGY & ADDITIONAL STUDIES:

## 2023-02-14 NOTE — BH CONSULTATION LIAISON PROGRESS NOTE - NSBHFUPINTERVALHXFT_PSY_A_CORE
Pt is a 78-year-old Black, cisgender male with prolonged hospitalization, mesenteric ischemia s/p SBR 12/2022, c/b intra-abdominal hematoma, LE gangrene, malnutrition/malabsorption. Transferred to SICU in the setting of sepsis for HD monitoring.     C/L psychology intern met with pt for 60-minute individual psychotherapy session. Upon approach, pt was awake, lying in bed. Pt was AAOx4. Initially, he reported feeling "not so good" after being given pain medication and having his bedding changed. He reported his friend brought him fried fish from outside of the hospital yesterday and that he enjoyed eating it. He said it was the first time anyone had brought him fresh food from outside the hospital that wasn't fast food. Session focused on pt's determination to eat. During session, he drank coffee and ate some of his lunch, expressing some positivity about his ability to eat. Writer provided empathic, active, reflective listening, as well as validation of pt's frustration around prolonged hospitalization. Pt denied SI/HI/SIB/AH/VH/PI.

## 2023-02-14 NOTE — PROGRESS NOTE ADULT - SUBJECTIVE AND OBJECTIVE BOX
**Incomplete Note**  OVERNIGHT EVENTS:    SUBJECTIVE / INTERVAL HPI: Patient seen and examined at bedside. Denies f/c, n/v, HA, chest pain, SOB, abdominal pain, diarrhea, constipation, melena, hematochezia, hematuria, dysuria    MEDICATIONS  (STANDING):  aMIOdarone    Tablet 100 milliGRAM(s) Oral every 24 hours  ascorbic acid 500 milliGRAM(s) Oral daily  chlorhexidine 2% Cloths 1 Application(s) Topical <User Schedule>  cholestyramine Powder (Sugar-Free) 2 Gram(s) Oral three times a day  DAPTOmycin IVPB 500 milliGRAM(s) IV Intermittent every 24 hours  DAPTOmycin IVPB      dextrose 5% + lactated ringers. 1000 milliLiter(s) (100 mL/Hr) IV Continuous <Continuous>  dextrose 5%. 1000 milliLiter(s) (100 mL/Hr) IV Continuous <Continuous>  dextrose 5%. 1000 milliLiter(s) (50 mL/Hr) IV Continuous <Continuous>  dextrose 5%. 1000 milliLiter(s) (50 mL/Hr) IV Continuous <Continuous>  dextrose 5%. 1000 milliLiter(s) (100 mL/Hr) IV Continuous <Continuous>  dextrose 50% Injectable 25 Gram(s) IV Push once  dextrose 50% Injectable 12.5 Gram(s) IV Push once  dextrose 50% Injectable 25 Gram(s) IV Push once  diphenoxylate/atropine 2 Tablet(s) Oral every 8 hours  ertapenem  IVPB 1000 milliGRAM(s) IV Intermittent every 24 hours  glucagon  Injectable 1 milliGRAM(s) IntraMuscular once  glucagon  Injectable 1 milliGRAM(s) IntraMuscular once  heparin  Infusion 1000 Unit(s)/Hr (14 mL/Hr) IV Continuous <Continuous>  influenza  Vaccine (HIGH DOSE) 0.7 milliLiter(s) IntraMuscular once  insulin lispro (ADMELOG) corrective regimen sliding scale   SubCutaneous every 6 hours  loperamide 4 milliGRAM(s) Oral every 8 hours  metoprolol succinate ER 25 milliGRAM(s) Oral daily  mirtazapine 30 milliGRAM(s) Oral at bedtime  multivitamin 1 Tablet(s) Oral daily  opium Tincture 6 milliGRAM(s) Oral every 12 hours  pantoprazole    Tablet 40 milliGRAM(s) Oral two times a day  psyllium Powder 1 Packet(s) Oral every 8 hours  sacubitril 49 mG/valsartan 51 mG 1 Tablet(s) Oral two times a day  zinc sulfate 220 milliGRAM(s) Oral daily    MEDICATIONS  (PRN):  acetaminophen     Tablet .. 650 milliGRAM(s) Oral every 6 hours PRN Temp greater or equal to 38C (100.4F), Mild Pain (1 - 3)  dextrose Oral Gel 15 Gram(s) Oral once PRN Blood Glucose LESS THAN 70 milliGRAM(s)/deciliter  dextrose Oral Gel 15 Gram(s) Oral once PRN Blood Glucose LESS THAN 70 milliGRAM(s)/deciliter  melatonin 3 milliGRAM(s) Oral at bedtime PRN Insomnia  ondansetron Injectable 4 milliGRAM(s) IV Push every 6 hours PRN Nausea and/or Vomiting  sodium chloride 0.9% lock flush 10 milliLiter(s) IV Push every 1 hour PRN Pre/post blood products, medications, blood draw, and to maintain line patency    Allergies    No Known Allergies    Intolerances        VITAL SIGNS:  Vital Signs Last 24 Hrs  T(C): 37.1 (14 Feb 2023 05:02), Max: 38.1 (13 Feb 2023 19:46)  T(F): 98.8 (14 Feb 2023 05:02), Max: 100.5 (13 Feb 2023 19:46)  HR: 88 (14 Feb 2023 05:00) (84 - 124)  BP: 127/70 (14 Feb 2023 05:00) (120/74 - 184/77)  BP(mean): 91 (14 Feb 2023 05:00) (78 - 116)  RR: 16 (14 Feb 2023 05:00) (9 - 38)  SpO2: 97% (14 Feb 2023 05:00) (85% - 100%)    Parameters below as of 14 Feb 2023 05:00  Patient On (Oxygen Delivery Method): room air          02-12-23 @ 07:01  -  02-13-23 @ 07:00  --------------------------------------------------------  IN: 3931 mL / OUT: 2850 mL / NET: 1081 mL    02-13-23 @ 07:01  -  02-14-23 @ 06:29  --------------------------------------------------------  IN: 3209 mL / OUT: 2430 mL / NET: 779 mL        PHYSICAL EXAM:  General: NAD, Laying comfortably in bed  HEENT: NC/AT, anicteric sclera, MMM  Neck: supple  Cardiovascular: +S1/S2, RRR, No murmurs, rubs, gallops  Respiratory: CTA B/L, no W/R/R  Gastrointestinal: soft, NT/ND, +BSx4  Extremities: WWP, no edema, clubbing or cyanosis  Vascular: 2+ radial, DP/PT pulses B/L  Neurological: AAOx3, no focal deficits      LABS:                        10.8   12.44 )-----------( 455      ( 14 Feb 2023 05:30 )             33.3     02-13    134<L>  |  102  |  11  ----------------------------<  105<H>  3.9   |  26  |  0.66    Ca    8.9      13 Feb 2023 05:30  Phos  2.9     02-13  Mg     2.0     02-13      PT/INR - ( 14 Feb 2023 05:19 )   PT: 15.7 sec;   INR: 1.32          PTT - ( 14 Feb 2023 05:19 )  PTT:70.6 sec    CAPILLARY BLOOD GLUCOSE      POCT Blood Glucose.: 99 mg/dL (14 Feb 2023 06:14)  POCT Blood Glucose.: 111 mg/dL (13 Feb 2023 23:05)  POCT Blood Glucose.: 99 mg/dL (13 Feb 2023 17:56)  POCT Blood Glucose.: 114 mg/dL (13 Feb 2023 12:44)          RADIOLOGY & ADDITIONAL TESTS: Reviewed. OVERNIGHT EVENTS: IR mentioning they are unable to perform percutaneous drainage since bowel is in front of collection, no safe window. Patient febrile to 100.5 at 8 pm (rectal).     SUBJECTIVE / INTERVAL HPI: Patient seen and examined at bedside. Denies f/c, n/v, HA, chest pain, SOB, abdominal pain, diarrhea, constipation, melena, hematochezia, hematuria, dysuria. Patient says he would like to leave the hospital. Informed him of the importance of draining his abdominal abscess and receiving Abx.     MEDICATIONS  (STANDING):  aMIOdarone    Tablet 100 milliGRAM(s) Oral every 24 hours  ascorbic acid 500 milliGRAM(s) Oral daily  chlorhexidine 2% Cloths 1 Application(s) Topical <User Schedule>  cholestyramine Powder (Sugar-Free) 2 Gram(s) Oral three times a day  DAPTOmycin IVPB 500 milliGRAM(s) IV Intermittent every 24 hours  DAPTOmycin IVPB      dextrose 5% + lactated ringers. 1000 milliLiter(s) (100 mL/Hr) IV Continuous <Continuous>  dextrose 5%. 1000 milliLiter(s) (100 mL/Hr) IV Continuous <Continuous>  dextrose 5%. 1000 milliLiter(s) (50 mL/Hr) IV Continuous <Continuous>  dextrose 5%. 1000 milliLiter(s) (50 mL/Hr) IV Continuous <Continuous>  dextrose 5%. 1000 milliLiter(s) (100 mL/Hr) IV Continuous <Continuous>  dextrose 50% Injectable 25 Gram(s) IV Push once  dextrose 50% Injectable 12.5 Gram(s) IV Push once  dextrose 50% Injectable 25 Gram(s) IV Push once  diphenoxylate/atropine 2 Tablet(s) Oral every 8 hours  ertapenem  IVPB 1000 milliGRAM(s) IV Intermittent every 24 hours  glucagon  Injectable 1 milliGRAM(s) IntraMuscular once  glucagon  Injectable 1 milliGRAM(s) IntraMuscular once  heparin  Infusion 1000 Unit(s)/Hr (14 mL/Hr) IV Continuous <Continuous>  influenza  Vaccine (HIGH DOSE) 0.7 milliLiter(s) IntraMuscular once  insulin lispro (ADMELOG) corrective regimen sliding scale   SubCutaneous every 6 hours  loperamide 4 milliGRAM(s) Oral every 8 hours  metoprolol succinate ER 25 milliGRAM(s) Oral daily  mirtazapine 30 milliGRAM(s) Oral at bedtime  multivitamin 1 Tablet(s) Oral daily  opium Tincture 6 milliGRAM(s) Oral every 12 hours  pantoprazole    Tablet 40 milliGRAM(s) Oral two times a day  psyllium Powder 1 Packet(s) Oral every 8 hours  sacubitril 49 mG/valsartan 51 mG 1 Tablet(s) Oral two times a day  zinc sulfate 220 milliGRAM(s) Oral daily    MEDICATIONS  (PRN):  acetaminophen     Tablet .. 650 milliGRAM(s) Oral every 6 hours PRN Temp greater or equal to 38C (100.4F), Mild Pain (1 - 3)  dextrose Oral Gel 15 Gram(s) Oral once PRN Blood Glucose LESS THAN 70 milliGRAM(s)/deciliter  dextrose Oral Gel 15 Gram(s) Oral once PRN Blood Glucose LESS THAN 70 milliGRAM(s)/deciliter  melatonin 3 milliGRAM(s) Oral at bedtime PRN Insomnia  ondansetron Injectable 4 milliGRAM(s) IV Push every 6 hours PRN Nausea and/or Vomiting  sodium chloride 0.9% lock flush 10 milliLiter(s) IV Push every 1 hour PRN Pre/post blood products, medications, blood draw, and to maintain line patency    Allergies    No Known Allergies    Intolerances        VITAL SIGNS:  Vital Signs Last 24 Hrs  T(C): 37.1 (14 Feb 2023 05:02), Max: 38.1 (13 Feb 2023 19:46)  T(F): 98.8 (14 Feb 2023 05:02), Max: 100.5 (13 Feb 2023 19:46)  HR: 88 (14 Feb 2023 05:00) (84 - 124)  BP: 127/70 (14 Feb 2023 05:00) (120/74 - 184/77)  BP(mean): 91 (14 Feb 2023 05:00) (78 - 116)  RR: 16 (14 Feb 2023 05:00) (9 - 38)  SpO2: 97% (14 Feb 2023 05:00) (85% - 100%)    Parameters below as of 14 Feb 2023 05:00  Patient On (Oxygen Delivery Method): room air          02-12-23 @ 07:01  -  02-13-23 @ 07:00  --------------------------------------------------------  IN: 3931 mL / OUT: 2850 mL / NET: 1081 mL    02-13-23 @ 07:01  -  02-14-23 @ 06:29  --------------------------------------------------------  IN: 3209 mL / OUT: 2430 mL / NET: 779 mL        PHYSICAL EXAM:  Constitutional: awake, NAD  Eyes: the sclera and conjunctiva were normal.   ENT: the ears and nose were normal in appearance.   Pulmonary: no respiratory distress and lungs were clear to auscultation bilaterally.   Heart: heart rate was normal and rhythm regular, normal S1 and S2  Vascular:. there was no peripheral edema  Abdomen: normal bowel sounds, soft, ileostomy bag      LABS:                        10.8   12.44 )-----------( 455      ( 14 Feb 2023 05:30 )             33.3     02-13    134<L>  |  102  |  11  ----------------------------<  105<H>  3.9   |  26  |  0.66    Ca    8.9      13 Feb 2023 05:30  Phos  2.9     02-13  Mg     2.0     02-13      PT/INR - ( 14 Feb 2023 05:19 )   PT: 15.7 sec;   INR: 1.32          PTT - ( 14 Feb 2023 05:19 )  PTT:70.6 sec    CAPILLARY BLOOD GLUCOSE      POCT Blood Glucose.: 99 mg/dL (14 Feb 2023 06:14)  POCT Blood Glucose.: 111 mg/dL (13 Feb 2023 23:05)  POCT Blood Glucose.: 99 mg/dL (13 Feb 2023 17:56)  POCT Blood Glucose.: 114 mg/dL (13 Feb 2023 12:44)          RADIOLOGY & ADDITIONAL TESTS: Reviewed.

## 2023-02-14 NOTE — BH CONSULTATION LIAISON PROGRESS NOTE - NSBHASSESSMENTFT_PSY_ALL_CORE
Patient is a 78 year old male with history of substance use and no significant past medical history (poor medical follow up, last PCP visit 20 years prior to admission), presenting with unresponsiveness from nursing home to [facility], found to be in Aflutter w RVR with subsegmental PE RUL, transferred to [facility], found to have LV thrombus and worsening HF, EF 10-15%. 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 Course further complicated by LLE pulselessness and ulcerations that will ultimately require BKA. Psychiatry consulted due to chronic depressed mood in the hospital and suicidality. Per staff pt verbalizes Si without intent, when he experiences high physical distress (pain). Upon re-assessment today patient presents with similarly low mood and hopelessness about his medical conditions, slightly more fatigued and with some confusion (disoriented to the day of the week). He continues to endorse feeling motivated to engage in treatment and work on improving his physical and nutritional state and denies active SI. Patient's past statements of wanting to die appear to be triggered by moments of high distress due to pain and frustration with lack of improvement in his physical state and not actual suicidal statements.  Pt currently presents stable, with no SI/HI/AVH/PI.    Plan:  -patient does not require 1:1 observation for acute suicidality  -continue mirtazapine 30mg QHS for ongoing depressive sx and insomnia, will observe for response  -Encourage sleep hygiene and limiting sleep during the day    -Delirium precautions  -Psychology to follow the pt daily for supportive psychotherapy

## 2023-02-14 NOTE — BH CONSULTATION LIAISON PROGRESS NOTE - NSBHATTESTCOMMENTATTENDFT_PSY_A_CORE
Patient is a 78 year old male with history of substance use and no significant past medical history (poor medical follow up, last PCP visit 20 years prior to admission), presenting with unresponsiveness from nursing home to [facility], found to be in Aflutter w RVR with subsegmental PE RUL, transferred to [facility], found to have LV thrombus and worsening HF, EF 10-15%. 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 Course further complicated by LLE pulselessness and ulcerations that will ultimately require BKA. Psychiatry consulted due to chronic depressed mood in the hospital and suicidality. Per staff pt verbalizes Si without intent, when he experiences high physical distress (pain). Upon re-assessment today patient presents with similarly low mood and hopelessness about his medical conditions, slightly more fatigued and with some confusion (disoriented to the day of the week). He continues to endorse feeling motivated to engage in treatment and work on improving his physical and nutritional state and denies active SI. Patient's past statements of wanting to die appear to be triggered by moments of high distress due to pain and frustration with lack of improvement in his physical state and not actual suicidal statements.  Pt currently presents stable, with no SI/HI/AVH/PI.    Plan:  -patient does not require 1:1 observation for acute suicidality; consider nursing enhanced supervision   -patient to be followed daily by the Psychology team for supportive psychotherapy    -continue mirtazapine 30mg QHS for ongoing depressive sx and insomnia, will observe for response  -Encourage sleep hygiene and limiting sleep during the day    -Delirium precautions

## 2023-02-14 NOTE — PROGRESS NOTE ADULT - ASSESSMENT
78M h/o prolonged hospital admission, mesenteric ischemia s/p SBR 12/2022 c/b intraabdominal hematoma/abscess, LE gangrene pending possible AKA, now found to be severe sepsis 2/2 polymicrobial bcteremia (ESBL K.pneumo, VRE), likely 2/2 undrained intraabdominal abscess.  CT c/a/p showed only slightly decreased abscess size.  Team spoke to IR for possible drainage, as he is very high risk for surgical washout.    - cont daptomycin 500mg IV q24h  - cont ertapenem 1g IV q24h  - BCx 2/10 growing ESBL Kleb pneumo and VRE, most likely 2/2 undrained abdominal abcesses, BCx 2/11 NGTD  - Follow up with IR for drainage plan  - CT A/P 2/12 with 7.4x5.5x7.7 cm loculated fluid collected in RLQ    ID Team 1 will continue to follow. Note not final until attending attestation available.    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  CT c/a/p showed only slightly decreased abscess size.  Team spoke to IR for possible drainage, as he is very high risk for surgical washout.    - cont daptomycin 500mg IV q24h  - cont ertapenem 1g IV q24h  - BCx 2/10 growing ESBL Kleb pneumo and VRE, most likely 2/2 undrained abdominal abcesses, BCx 2/11 NGTD 48h  - Follow up with surgery regarding drainage plan   - CT A/P 2/12 with 7.4x5.5x7.7 cm loculated fluid collected in RLQ    ID Team 1 will continue to follow. Note not final until attending attestation available.    Case discussed with ID attending Dr Guzman

## 2023-02-14 NOTE — BH CONSULTATION LIAISON PROGRESS NOTE - OTHER
Pt has been more engaged in watching TV, and he has specifically been watching the news and is able to discuss current events presented on the news.

## 2023-02-14 NOTE — PROGRESS NOTE ADULT - SUBJECTIVE AND OBJECTIVE BOX
ON: PTT 64- qd ptts, Tmax 100.5, In AM HTN to 180 with leg pain, dilaudid  2/13: IR drainage not amenable 2/2 bowel in front of collection. Regular diet, ensures ordered. PTT 80. Entresto dose increased per cards    SUBJECTIVE:    MEDICATIONS  (STANDING):  aMIOdarone    Tablet 100 milliGRAM(s) Oral every 24 hours  ascorbic acid 500 milliGRAM(s) Oral daily  chlorhexidine 2% Cloths 1 Application(s) Topical <User Schedule>  cholestyramine Powder (Sugar-Free) 2 Gram(s) Oral three times a day  DAPTOmycin IVPB      DAPTOmycin IVPB 500 milliGRAM(s) IV Intermittent every 24 hours  dextrose 5% + lactated ringers. 1000 milliLiter(s) (100 mL/Hr) IV Continuous <Continuous>  dextrose 5%. 1000 milliLiter(s) (100 mL/Hr) IV Continuous <Continuous>  dextrose 5%. 1000 milliLiter(s) (50 mL/Hr) IV Continuous <Continuous>  dextrose 5%. 1000 milliLiter(s) (50 mL/Hr) IV Continuous <Continuous>  dextrose 5%. 1000 milliLiter(s) (100 mL/Hr) IV Continuous <Continuous>  dextrose 50% Injectable 25 Gram(s) IV Push once  dextrose 50% Injectable 12.5 Gram(s) IV Push once  dextrose 50% Injectable 25 Gram(s) IV Push once  diphenoxylate/atropine 2 Tablet(s) Oral every 8 hours  ertapenem  IVPB 1000 milliGRAM(s) IV Intermittent every 24 hours  glucagon  Injectable 1 milliGRAM(s) IntraMuscular once  glucagon  Injectable 1 milliGRAM(s) IntraMuscular once  heparin  Infusion 1000 Unit(s)/Hr (14 mL/Hr) IV Continuous <Continuous>  influenza  Vaccine (HIGH DOSE) 0.7 milliLiter(s) IntraMuscular once  insulin lispro (ADMELOG) corrective regimen sliding scale   SubCutaneous every 6 hours  loperamide 4 milliGRAM(s) Oral every 8 hours  metoprolol succinate ER 25 milliGRAM(s) Oral daily  mirtazapine 30 milliGRAM(s) Oral at bedtime  multivitamin 1 Tablet(s) Oral daily  opium Tincture 6 milliGRAM(s) Oral every 12 hours  pantoprazole    Tablet 40 milliGRAM(s) Oral two times a day  psyllium Powder 1 Packet(s) Oral every 8 hours  sacubitril 49 mG/valsartan 51 mG 1 Tablet(s) Oral two times a day  zinc sulfate 220 milliGRAM(s) Oral daily    MEDICATIONS  (PRN):  acetaminophen     Tablet .. 650 milliGRAM(s) Oral every 6 hours PRN Temp greater or equal to 38C (100.4F), Mild Pain (1 - 3)  dextrose Oral Gel 15 Gram(s) Oral once PRN Blood Glucose LESS THAN 70 milliGRAM(s)/deciliter  dextrose Oral Gel 15 Gram(s) Oral once PRN Blood Glucose LESS THAN 70 milliGRAM(s)/deciliter  melatonin 3 milliGRAM(s) Oral at bedtime PRN Insomnia  ondansetron Injectable 4 milliGRAM(s) IV Push every 6 hours PRN Nausea and/or Vomiting  sodium chloride 0.9% lock flush 10 milliLiter(s) IV Push every 1 hour PRN Pre/post blood products, medications, blood draw, and to maintain line patency      Drips:     ICU Vital Signs Last 24 Hrs  T(C): 37.1 (14 Feb 2023 05:02), Max: 38.1 (13 Feb 2023 19:46)  T(F): 98.8 (14 Feb 2023 05:02), Max: 100.5 (13 Feb 2023 19:46)  HR: 91 (14 Feb 2023 06:00) (84 - 124)  BP: 139/69 (14 Feb 2023 06:00) (120/74 - 184/77)  BP(mean): 98 (14 Feb 2023 06:00) (78 - 116)  ABP: --  ABP(mean): --  RR: 16 (14 Feb 2023 06:00) (9 - 38)  SpO2: 92% (14 Feb 2023 06:00) (85% - 100%)    O2 Parameters below as of 14 Feb 2023 06:00  Patient On (Oxygen Delivery Method): room air            Physical Exam:  General: NAD  HEENT: NC/AT, EOMI, PERRLA, normal hearing, no oral lesions, neck supple w/o LAD  Pulmonary: Nonlabored breathing, no respiratory distress, CTA-B  Cardiovascular: NSR, no murmurs  Abdominal: soft, NT/ND, +BS, no organomegaly  Extremities: WWP, 5/5 strength x 4, no clubbing/cyanosis/edema  Neuro: A/O x3, CNs II-XII grossly intact, normal motor/sensation, no focal deficits  Pulses: palpable distal pulses    Lines/tubes/drains:  Kalani:	      Shelby settings:      I&O's Summary    12 Feb 2023 07:01  -  13 Feb 2023 07:00  --------------------------------------------------------  IN: 3931 mL / OUT: 2850 mL / NET: 1081 mL    13 Feb 2023 07:01  -  14 Feb 2023 06:58  --------------------------------------------------------  IN: 3209 mL / OUT: 2430 mL / NET: 779 mL        LABS:                        10.8   12.44 )-----------( 455      ( 14 Feb 2023 05:30 )             33.3     02-14    137  |  104  |  9   ----------------------------<  120<H>  3.6   |  23  |  0.61    Ca    9.0      14 Feb 2023 05:30  Phos  3.0     02-14  Mg     2.0     02-14      PT/INR - ( 14 Feb 2023 05:19 )   PT: 15.7 sec;   INR: 1.32          PTT - ( 14 Feb 2023 05:19 )  PTT:70.6 sec    CAPILLARY BLOOD GLUCOSE      POCT Blood Glucose.: 99 mg/dL (14 Feb 2023 06:14)  POCT Blood Glucose.: 111 mg/dL (13 Feb 2023 23:05)  POCT Blood Glucose.: 99 mg/dL (13 Feb 2023 17:56)  POCT Blood Glucose.: 114 mg/dL (13 Feb 2023 12:44)        Cultures:    RADIOLOGY & ADDITIONAL STUDIES:     ON: PTT 64- qd ptts, Tmax 100.5, In AM HTN to 180 with leg pain, dilaudid  2/13: IR drainage not amenable 2/2 bowel in front of collection. Regular diet, ensures ordered. PTT 80. Entresto dose increased per cards    SUBJECTIVE: On am rounds odor noted in patient's room, left leg dressing taken down, and approximately 50-60cc of seropurulent fluid drained from it, probed with qtip and additional fluid drained, 0.5 dilaudid given due to pain, otherwise no c/o, asking for coffee.    MEDICATIONS  (STANDING):  aMIOdarone    Tablet 100 milliGRAM(s) Oral every 24 hours  ascorbic acid 500 milliGRAM(s) Oral daily  chlorhexidine 2% Cloths 1 Application(s) Topical <User Schedule>  cholestyramine Powder (Sugar-Free) 2 Gram(s) Oral three times a day  DAPTOmycin IVPB      DAPTOmycin IVPB 500 milliGRAM(s) IV Intermittent every 24 hours  dextrose 5% + lactated ringers. 1000 milliLiter(s) (100 mL/Hr) IV Continuous <Continuous>  dextrose 5%. 1000 milliLiter(s) (100 mL/Hr) IV Continuous <Continuous>  dextrose 5%. 1000 milliLiter(s) (50 mL/Hr) IV Continuous <Continuous>  dextrose 5%. 1000 milliLiter(s) (50 mL/Hr) IV Continuous <Continuous>  dextrose 5%. 1000 milliLiter(s) (100 mL/Hr) IV Continuous <Continuous>  dextrose 50% Injectable 25 Gram(s) IV Push once  dextrose 50% Injectable 12.5 Gram(s) IV Push once  dextrose 50% Injectable 25 Gram(s) IV Push once  diphenoxylate/atropine 2 Tablet(s) Oral every 8 hours  ertapenem  IVPB 1000 milliGRAM(s) IV Intermittent every 24 hours  glucagon  Injectable 1 milliGRAM(s) IntraMuscular once  glucagon  Injectable 1 milliGRAM(s) IntraMuscular once  heparin  Infusion 1000 Unit(s)/Hr (14 mL/Hr) IV Continuous <Continuous>  influenza  Vaccine (HIGH DOSE) 0.7 milliLiter(s) IntraMuscular once  insulin lispro (ADMELOG) corrective regimen sliding scale   SubCutaneous every 6 hours  loperamide 4 milliGRAM(s) Oral every 8 hours  metoprolol succinate ER 25 milliGRAM(s) Oral daily  mirtazapine 30 milliGRAM(s) Oral at bedtime  multivitamin 1 Tablet(s) Oral daily  opium Tincture 6 milliGRAM(s) Oral every 12 hours  pantoprazole    Tablet 40 milliGRAM(s) Oral two times a day  psyllium Powder 1 Packet(s) Oral every 8 hours  sacubitril 49 mG/valsartan 51 mG 1 Tablet(s) Oral two times a day  zinc sulfate 220 milliGRAM(s) Oral daily    MEDICATIONS  (PRN):  acetaminophen     Tablet .. 650 milliGRAM(s) Oral every 6 hours PRN Temp greater or equal to 38C (100.4F), Mild Pain (1 - 3)  dextrose Oral Gel 15 Gram(s) Oral once PRN Blood Glucose LESS THAN 70 milliGRAM(s)/deciliter  dextrose Oral Gel 15 Gram(s) Oral once PRN Blood Glucose LESS THAN 70 milliGRAM(s)/deciliter  melatonin 3 milliGRAM(s) Oral at bedtime PRN Insomnia  ondansetron Injectable 4 milliGRAM(s) IV Push every 6 hours PRN Nausea and/or Vomiting  sodium chloride 0.9% lock flush 10 milliLiter(s) IV Push every 1 hour PRN Pre/post blood products, medications, blood draw, and to maintain line patency      Drips:     ICU Vital Signs Last 24 Hrs  T(C): 37.1 (14 Feb 2023 05:02), Max: 38.1 (13 Feb 2023 19:46)  T(F): 98.8 (14 Feb 2023 05:02), Max: 100.5 (13 Feb 2023 19:46)  HR: 91 (14 Feb 2023 06:00) (84 - 124)  BP: 139/69 (14 Feb 2023 06:00) (120/74 - 184/77)  BP(mean): 98 (14 Feb 2023 06:00) (78 - 116)  ABP: --  ABP(mean): --  RR: 16 (14 Feb 2023 06:00) (9 - 38)  SpO2: 92% (14 Feb 2023 06:00) (85% - 100%)    O2 Parameters below as of 14 Feb 2023 06:00  Patient On (Oxygen Delivery Method): room air            Physical Exam:    General: NAD, resting comfortably in bed  HEENT: NC/AT, EOMI, PERRLA  Pulmonary: Satting well on RA, b/l breath sounds, no r/r/w  Cardiovascular: NSR, s1 s2, no m/r/g  Abdominal: Soft, NTND, ostomy with yellow liquid stool in the bag  Extremities: Left leg with seropurulent drainage as mentioned above, wrapped with kerlix  Neuro: AAO x3      Lines/tubes/drains:  Uriarte:	      Vent settings:      I&O's Summary    12 Feb 2023 07:01  -  13 Feb 2023 07:00  --------------------------------------------------------  IN: 3931 mL / OUT: 2850 mL / NET: 1081 mL    13 Feb 2023 07:01  -  14 Feb 2023 06:58  --------------------------------------------------------  IN: 3209 mL / OUT: 2430 mL / NET: 779 mL        LABS:                        10.8   12.44 )-----------( 455      ( 14 Feb 2023 05:30 )             33.3     02-14    137  |  104  |  9   ----------------------------<  120<H>  3.6   |  23  |  0.61    Ca    9.0      14 Feb 2023 05:30  Phos  3.0     02-14  Mg     2.0     02-14      PT/INR - ( 14 Feb 2023 05:19 )   PT: 15.7 sec;   INR: 1.32          PTT - ( 14 Feb 2023 05:19 )  PTT:70.6 sec    CAPILLARY BLOOD GLUCOSE      POCT Blood Glucose.: 99 mg/dL (14 Feb 2023 06:14)  POCT Blood Glucose.: 111 mg/dL (13 Feb 2023 23:05)  POCT Blood Glucose.: 99 mg/dL (13 Feb 2023 17:56)  POCT Blood Glucose.: 114 mg/dL (13 Feb 2023 12:44)        Cultures:    RADIOLOGY & ADDITIONAL STUDIES:

## 2023-02-14 NOTE — PROGRESS NOTE ADULT - ATTENDING COMMENTS
On exam today, noted to have significant purulent drainage from LLE wound under the eschar, this likely represents the source of infection as opposed to the intraabdominal collection which has been present for several weeks. Will discuss with Vascular surgery regarding debridement vs. amputation in setting of acute infection. Continue antibiotics, hep gtt, nutritional optimization. Will defer ostomy closure at this time.

## 2023-02-14 NOTE — PROGRESS NOTE ADULT - ASSESSMENT
77M with PMHx of IVDU found unresponsive at his nursing home, resolved after Narcan in the field and brought to [facility]. Found to have PE, atrial flutter, and large LV thrombus on echo. Transferred to [facility] for further management. Pt c/o abdominal pain on 12/10 CTA showing mid SMA with embolus. Abnormal distal small bowel loops and cecum with dilatation and pneumatosis suggesting infarcted bowel. One or two tiny foci of intrahepatic portal vein pneumatosis. Segmental infarction upper pole left kidney. Now s/p Ex lap, SMA embolectomy, 80cm SBR, abthera vac left in discontinuity (12/11) and transferred to SICU intubated. S/p second look (12/13) and most recently s/p OR for 3rd look, end-to-end anastomosis of remaining bowel, loop ileostomy and abdomen closure (12/15). Now stepped down to telemtry. LLE ischemia, AKA delayed by nutritional optimization. Transferred to SICU in the setting of sepsis for HD monitoring.    Plan;    Continue nutrition optimization   Plan for OR Thursday for diagnostic lap     Rest of care per ICU

## 2023-02-14 NOTE — PROGRESS NOTE ADULT - ASSESSMENT
77M with PMHx of IVDU found unresponsive at his nursing home, resolved after Narcan in the field and brought to [facility]. Found to have PE, atrial flutter, and large LV thrombus on echo. Transferred to [facility] for further management. Pt c/o abdominal pain on 12/10 CTA showing mid SMA with embolus. Abnormal distal small bowel loops and cecum with dilatation and pneumatosis suggesting infarcted bowel. One or two tiny foci of intrahepatic portal vein pneumatosis. Segmental infarction upper pole left kidney. Now s/p Ex lap, SMA embolectomy, 80cm SBR, abthera vac left in discontinuity (12/11) and transferred to SICU intubated. S/p second look (12/13) and most recently s/p OR for 3rd look, end-to-end anastomosis of remaining bowel, loop ileostomy and abdomen closure (12/15). Now stepped down to telemtry. LLE ischemia, AKA delayed by nutritional optimization. Transferred to SICU in the setting of sepsis for HD monitoring. On am rounds odor noted in patient's room, left leg dressing taken down, and approximately 50-60cc of seropurulent fluid drained from it, probed with qtip and additional fluid drained; plan for aka with vascular on 02/15.    Plan:    Neuro: No active issues   CV: Hypotension 2/2 sepsis/hypovolemia. Spirolactone held. IV abx started. Hypovolemic: Bolus as needed. D5LR started at 150/hr. Repeat echo with EF 55-60%. Mildly dilated RV. Maintain MAP >65. A.Fib/Flutter: s/p cardioversion on 12/30. Continue metoprolol/amiodarone   Pulm: Sating well on nasal canula   GI/FEN: High Ostomy output. Replete. Continue Immodium/Tincture of Opium. PPN discontinued in the setting of bacteremia. D5LR started. NPO @mn for OR.  : Condom cath in place. Strict I and O.   Endo: mISS  Heme: Anemia H/H 7.4/22.5 s/p 2uPRBC (02/11). LV thrombis w/ LLE ischemia. SQL on hold for possible procedure. Heparin drip started. Active T&S;   ID: Sepsis 2/2 K. Pneumoniae/Enterococcus VRE Bacteremia: Undrainable abscess in Abd, _ LLExt ischemia with associated infection: AKA on 02/15.Cont: Luis (2/10--) (Dapto 2/11--)   dc//:: Zosyn. Vanc (2/10--2/11).  PPX: SCDs  L/D/T: PIV  PT/OT: Not ordered  Dispo: SICU, [facility]

## 2023-02-14 NOTE — PROGRESS NOTE ADULT - ATTENDING COMMENTS
Agree with above.  I don't believe antibiotics alone, in the absence of surgical drainage, will cure this infection as his current bacteremia emerged on Zosyn and the collection is largely unchanged in size.  Recommend general surgery evaluation for drainage to obtain source control.  Can continue Ertapenem and Daptomycin for now

## 2023-02-14 NOTE — PROGRESS NOTE ADULT - SUBJECTIVE AND OBJECTIVE BOX
24 hr events: PTT 64- qd ptts, Tmax 100.5, In AM HTN to 180 with leg pain, dilaudid    SUBJECTIVE: Patient seen at bedside in no acute distress. In poor spirits. No CP, SOB, nausea/vomiting    Vital Signs Last 24 Hrs  T(C): 37.1 (14 Feb 2023 09:00), Max: 38.1 (13 Feb 2023 19:46)  T(F): 98.8 (14 Feb 2023 09:00), Max: 100.5 (13 Feb 2023 19:46)  HR: 84 (14 Feb 2023 09:00) (84 - 124)  BP: 160/70 (14 Feb 2023 09:00) (120/74 - 184/77)  BP(mean): 101 (14 Feb 2023 09:00) (78 - 116)  RR: 19 (14 Feb 2023 09:00) (9 - 38)  SpO2: 100% (14 Feb 2023 09:00) (85% - 100%)    Parameters below as of 14 Feb 2023 09:00  Patient On (Oxygen Delivery Method): room air        Physical Exam:  General: NAD, resting comfortably in bed  HEENT: NC/AT, EOMI, PERRLA  Pulmonary: Satting well on RA, b/l breath sounds, no r/r/w  Cardiovascular: S1 s2, NSR, no m/r/g  Abdominal: Soft, NTND, ostomy with yellow liquid stool   Extremities: Left leg dry gangrene extending from toes to just below the knee, dressed with kerlix  Neuro: AAOx3    Lines/drains/tubes:    I&O's Summary    13 Feb 2023 07:01  -  14 Feb 2023 07:00  --------------------------------------------------------  IN: 3437 mL / OUT: 2830 mL / NET: 607 mL    14 Feb 2023 07:01  -  14 Feb 2023 09:16  --------------------------------------------------------  IN: 228 mL / OUT: 250 mL / NET: -22 mL        LABS:                        10.8   12.44 )-----------( 455      ( 14 Feb 2023 05:30 )             33.3     02-14    137  |  104  |  9   ----------------------------<  120<H>  3.6   |  23  |  0.61    Ca    9.0      14 Feb 2023 05:30  Phos  3.0     02-14  Mg     2.0     02-14      PT/INR - ( 14 Feb 2023 05:19 )   PT: 15.7 sec;   INR: 1.32          PTT - ( 14 Feb 2023 05:19 )  PTT:70.6 sec    CAPILLARY BLOOD GLUCOSE      POCT Blood Glucose.: 99 mg/dL (14 Feb 2023 06:14)  POCT Blood Glucose.: 111 mg/dL (13 Feb 2023 23:05)  POCT Blood Glucose.: 99 mg/dL (13 Feb 2023 17:56)  POCT Blood Glucose.: 114 mg/dL (13 Feb 2023 12:44)        RADIOLOGY & ADDITIONAL STUDIES:

## 2023-02-15 NOTE — PROGRESS NOTE ADULT - ASSESSMENT
A/P: 78yMale pre-op for above procedure  1. NPO past midnight, except medications  2. IVF at midnight: D51/2NS @ 100cc/hr   3. [ ] Blood on hold, Units: A/P: 78yMale pre-op for left AKA     1. NPO past midnight, except medications  2. IVF at midnight: D51/2NS @ 100cc/hr   3. [ ] Blood on hold, Units:

## 2023-02-15 NOTE — PRE-ANESTHESIA EVALUATION ADULT - NSANTHPMHFT_GEN_ALL_CORE
77M with PMHx of IVDU found unresponsive at his nursing home, resolved after Narcan in the field and brought to [facility]. Found to have PE, atrial flutter, and large LV thrombus on echo. Transferred to [facility] for further management. Pt c/o abdominal pain on 12/10 CTA showing mid SMA with embolus. Abnormal distal small bowel loops and cecum with dilatation and pneumatosis suggesting infarcted bowel. One or two tiny foci of intrahepatic portal vein pneumatosis. Segmental infarction upper pole left kidney. Now s/p Ex lap, SMA embolectomy, 80cm SBR, abthera vac left in discontinuity (12/11) and transferred to SICU intubated. S/p second look (12/13) and most recently s/p OR for 3rd look, end-to-end anastomosis of remaining bowel, loop ileostomy and abdomen closure (12/15). IR consulted 1/27 for RLQ abdominal collection drainage, which was not amenable to percutaneous drainage. IR consulted 2/13 for reevaluation.

## 2023-02-15 NOTE — PROGRESS NOTE ADULT - SUBJECTIVE AND OBJECTIVE BOX
Pre-op Diagnosis: Disorder of Circulatory System   Procedure: Left above knee amputation   Surgeon: Dr. Greenberg/ Dr. Dean     Consent: ***                          10.8   12.44 )-----------( 455      ( 14 Feb 2023 05:30 )             33.3     02-14    137  |  104  |  9   ----------------------------<  120<H>  3.6   |  23  |  0.61    Ca    9.0      14 Feb 2023 05:30  Phos  3.0     02-14  Mg     2.0     02-14      PT/INR - ( 14 Feb 2023 16:47 )   PT: 16.4 sec;   INR: 1.37          PTT - ( 14 Feb 2023 16:47 )  PTT:58.1 sec      Type & Screen: A Positive, 2/14/23 (*With most recent within 72hrs of OR)  CXR: 2/10/23  EKG:   UA: 2/11/23    COVID status/date: [x]Negative/Date: 2/14/2023  [ ]Positive/Date:     *With most recent within 48hrs of OR    Is patient on ACE/ARB? [ ]No [x]Yes: holding entresto   *If yes, please hold any ACE/ARB the day of surgery    Is patient on Lantus at bedtime?  [x]No [ ]Yes   *If yes, please half the dose the night before OR since patient will be NPO    Does patient have a contrast allergy? [x]No [ ]Yes  *If yes, please pre-medicate per protocol    Is patient on anticoagulation? [ ]No [x] Yes: hold heparin gtt on call to OR   *If yes, please discuss with team when to hold it    Is the patient Female and <56yo [x]No [ ] Yes  If yes, pregnancy test must be documented in the chart    Is patient on dialysis? [x]No [ ]Yes  *If yes, please obtain all labs including K level EARLY the day of surgery   *Also, will NOT require IVF past midnight   PRE-OP NOTE:     Pre-op Diagnosis: Disorder of Circulatory System   Procedure: Left above knee amputation   Surgeon: Dr. Greenberg/ Dr. Dean     Consent: ***                          10.8   12.44 )-----------( 455      ( 14 Feb 2023 05:30 )             33.3     02-14    137  |  104  |  9   ----------------------------<  120<H>  3.6   |  23  |  0.61    Ca    9.0      14 Feb 2023 05:30  Phos  3.0     02-14  Mg     2.0     02-14      PT/INR - ( 14 Feb 2023 16:47 )   PT: 16.4 sec;   INR: 1.37          PTT - ( 14 Feb 2023 16:47 )  PTT:58.1 sec      Type & Screen: A Positive, 2/14/23 (*With most recent within 72hrs of OR)  CXR: 2/10/23  EKG:   UA: 2/11/23    COVID status/date: [x]Negative/Date: 2/14/2023  [ ]Positive/Date:     *With most recent within 48hrs of OR    Is patient on ACE/ARB? [ ]No [x]Yes: holding entresto   *If yes, please hold any ACE/ARB the day of surgery    Is patient on Lantus at bedtime?  [x]No [ ]Yes   *If yes, please half the dose the night before OR since patient will be NPO    Does patient have a contrast allergy? [x]No [ ]Yes  *If yes, please pre-medicate per protocol    Is patient on anticoagulation? [ ]No [x] Yes: hold heparin gtt on call to OR   *If yes, please discuss with team when to hold it    Is the patient Female and <54yo [x]No [ ] Yes  If yes, pregnancy test must be documented in the chart    Is patient on dialysis? [x]No [ ]Yes  *If yes, please obtain all labs including K level EARLY the day of surgery   *Also, will NOT require IVF past midnight   PRE-OP NOTE:     Pre-op Diagnosis: Disorder of Circulatory System   Procedure: Left above knee amputation   Surgeon: Dr. Greenbreg/ Dr. Dean     Consent: Yes, in chart                           10.8   12.44 )-----------( 455      ( 2023 05:30 )             33.3     02-14    137  |  104  |  9   ----------------------------<  120<H>  3.6   |  23  |  0.61    Ca    9.0      2023 05:30  Phos  3.0     02-14  Mg     2.0     02-14      PT/INR - ( 2023 16:47 )   PT: 16.4 sec;   INR: 1.37          PTT - ( 2023 16:47 )  PTT:58.1 sec      Type & Screen: A Positive, 23 (*With most recent within 72hrs of OR)  CXR: 2/10/23  EK/10/23   UA: 23    COVID status/date: [x]Negative/Date: 2023  [ ]Positive/Date:     *With most recent within 48hrs of OR    Is patient on ACE/ARB? [ ]No [x]Yes: holding entresto   *If yes, please hold any ACE/ARB the day of surgery    Is patient on Lantus at bedtime?  [x]No [ ]Yes   *If yes, please half the dose the night before OR since patient will be NPO    Does patient have a contrast allergy? [x]No [ ]Yes  *If yes, please pre-medicate per protocol    Is patient on anticoagulation? [ ]No [x] Yes: hold heparin gtt on call to OR   *If yes, please discuss with team when to hold it    Is the patient Female and <54yo [x]No [ ] Yes  If yes, pregnancy test must be documented in the chart    Is patient on dialysis? [x]No [ ]Yes  *If yes, please obtain all labs including K level EARLY the day of surgery   *Also, will NOT require IVF past midnight

## 2023-02-15 NOTE — CHART NOTE - NSCHARTNOTEFT_GEN_A_CORE
The writer attempted to meet with the patient for f/u individual psychotherapy; however, the patient was being seen by other medical professionals. Psychology will continue to follow.

## 2023-02-15 NOTE — PROGRESS NOTE ADULT - SUBJECTIVE AND OBJECTIVE BOX
**Incomplete Note**  OVERNIGHT EVENTS:    SUBJECTIVE / INTERVAL HPI: Patient seen and examined at bedside. Denies f/c, n/v, HA, chest pain, SOB, abdominal pain, diarrhea, constipation, melena, hematochezia, hematuria, dysuria    MEDICATIONS  (STANDING):  aMIOdarone    Tablet 100 milliGRAM(s) Oral every 24 hours  ascorbic acid 500 milliGRAM(s) Oral daily  chlorhexidine 2% Cloths 1 Application(s) Topical <User Schedule>  cholestyramine Powder (Sugar-Free) 2 Gram(s) Oral three times a day  DAPTOmycin IVPB      DAPTOmycin IVPB 500 milliGRAM(s) IV Intermittent every 24 hours  dextrose 5% + lactated ringers. 1000 milliLiter(s) (100 mL/Hr) IV Continuous <Continuous>  dextrose 5%. 1000 milliLiter(s) (100 mL/Hr) IV Continuous <Continuous>  dextrose 5%. 1000 milliLiter(s) (50 mL/Hr) IV Continuous <Continuous>  dextrose 5%. 1000 milliLiter(s) (50 mL/Hr) IV Continuous <Continuous>  dextrose 5%. 1000 milliLiter(s) (100 mL/Hr) IV Continuous <Continuous>  dextrose 50% Injectable 25 Gram(s) IV Push once  dextrose 50% Injectable 12.5 Gram(s) IV Push once  dextrose 50% Injectable 25 Gram(s) IV Push once  diphenoxylate/atropine 2 Tablet(s) Oral every 8 hours  ertapenem  IVPB 1000 milliGRAM(s) IV Intermittent every 24 hours  glucagon  Injectable 1 milliGRAM(s) IntraMuscular once  glucagon  Injectable 1 milliGRAM(s) IntraMuscular once  heparin  Infusion 1000 Unit(s)/Hr (14 mL/Hr) IV Continuous <Continuous>  influenza  Vaccine (HIGH DOSE) 0.7 milliLiter(s) IntraMuscular once  insulin lispro (ADMELOG) corrective regimen sliding scale   SubCutaneous every 6 hours  loperamide 4 milliGRAM(s) Oral every 8 hours  metoprolol succinate ER 25 milliGRAM(s) Oral daily  mirtazapine 30 milliGRAM(s) Oral at bedtime  multivitamin 1 Tablet(s) Oral daily  opium Tincture 6 milliGRAM(s) Oral every 12 hours  pantoprazole    Tablet 40 milliGRAM(s) Oral two times a day  povidone iodine 10% Solution 1 Application(s) Topical daily  psyllium Powder 1 Packet(s) Oral every 8 hours  zinc sulfate 220 milliGRAM(s) Oral daily    MEDICATIONS  (PRN):  acetaminophen     Tablet .. 650 milliGRAM(s) Oral every 6 hours PRN Temp greater or equal to 38C (100.4F), Mild Pain (1 - 3)  dextrose Oral Gel 15 Gram(s) Oral once PRN Blood Glucose LESS THAN 70 milliGRAM(s)/deciliter  dextrose Oral Gel 15 Gram(s) Oral once PRN Blood Glucose LESS THAN 70 milliGRAM(s)/deciliter  melatonin 3 milliGRAM(s) Oral at bedtime PRN Insomnia  ondansetron Injectable 4 milliGRAM(s) IV Push every 6 hours PRN Nausea and/or Vomiting  sodium chloride 0.9% lock flush 10 milliLiter(s) IV Push every 1 hour PRN Pre/post blood products, medications, blood draw, and to maintain line patency    Allergies    No Known Allergies    Intolerances        VITAL SIGNS:  Vital Signs Last 24 Hrs  T(C): 37.3 (15 Feb 2023 02:05), Max: 37.8 (14 Feb 2023 21:55)  T(F): 99.1 (15 Feb 2023 02:05), Max: 100.1 (14 Feb 2023 21:55)  HR: 86 (15 Feb 2023 04:00) (82 - 118)  BP: 121/66 (15 Feb 2023 04:00) (111/59 - 178/78)  BP(mean): 82 (15 Feb 2023 04:00) (77 - 112)  RR: 20 (15 Feb 2023 04:00) (12 - 33)  SpO2: 100% (15 Feb 2023 04:00) (92% - 100%)    Parameters below as of 15 Feb 2023 04:00  Patient On (Oxygen Delivery Method): room air          02-13-23 @ 07:01  -  02-14-23 @ 07:00  --------------------------------------------------------  IN: 3437 mL / OUT: 2830 mL / NET: 607 mL    02-14-23 @ 07:01  -  02-15-23 @ 06:21  --------------------------------------------------------  IN: 3322 mL / OUT: 3655 mL / NET: -333 mL        PHYSICAL EXAM:  General: NAD, Laying comfortably in bed  HEENT: NC/AT, anicteric sclera, MMM  Neck: supple  Cardiovascular: +S1/S2, RRR, No murmurs, rubs, gallops  Respiratory: CTA B/L, no W/R/R  Gastrointestinal: soft, NT/ND, +BSx4  Extremities: WWP, no edema, clubbing or cyanosis  Vascular: 2+ radial, DP/PT pulses B/L  Neurological: AAOx3, no focal deficits      LABS:                        10.8   12.14 )-----------( 484      ( 15 Feb 2023 05:30 )             34.6     02-14    137  |  104  |  9   ----------------------------<  120<H>  3.6   |  23  |  0.61    Ca    9.0      14 Feb 2023 05:30  Phos  3.0     02-14  Mg     2.0     02-14      PT/INR - ( 14 Feb 2023 16:47 )   PT: 16.4 sec;   INR: 1.37          PTT - ( 15 Feb 2023 05:40 )  PTT:72.2 sec    CAPILLARY BLOOD GLUCOSE      POCT Blood Glucose.: 97 mg/dL (15 Feb 2023 05:34)  POCT Blood Glucose.: 137 mg/dL (14 Feb 2023 23:44)  POCT Blood Glucose.: 89 mg/dL (14 Feb 2023 17:43)  POCT Blood Glucose.: 102 mg/dL (14 Feb 2023 12:15)          RADIOLOGY & ADDITIONAL TESTS: Reviewed. OVERNIGHT EVENTS: [place]    SUBJECTIVE / INTERVAL HPI: Patient seen and examined at bedside. Denies f/c, n/v, HA, chest pain, SOB, abdominal pain, diarrhea, constipation, melena, hematochezia, hematuria, dysuria    MEDICATIONS  (STANDING):  aMIOdarone    Tablet 100 milliGRAM(s) Oral every 24 hours  ascorbic acid 500 milliGRAM(s) Oral daily  chlorhexidine 2% Cloths 1 Application(s) Topical <User Schedule>  cholestyramine Powder (Sugar-Free) 2 Gram(s) Oral three times a day  DAPTOmycin IVPB      DAPTOmycin IVPB 500 milliGRAM(s) IV Intermittent every 24 hours  dextrose 5% + lactated ringers. 1000 milliLiter(s) (100 mL/Hr) IV Continuous <Continuous>  dextrose 5%. 1000 milliLiter(s) (100 mL/Hr) IV Continuous <Continuous>  dextrose 5%. 1000 milliLiter(s) (50 mL/Hr) IV Continuous <Continuous>  dextrose 5%. 1000 milliLiter(s) (50 mL/Hr) IV Continuous <Continuous>  dextrose 5%. 1000 milliLiter(s) (100 mL/Hr) IV Continuous <Continuous>  dextrose 50% Injectable 25 Gram(s) IV Push once  dextrose 50% Injectable 12.5 Gram(s) IV Push once  dextrose 50% Injectable 25 Gram(s) IV Push once  diphenoxylate/atropine 2 Tablet(s) Oral every 8 hours  ertapenem  IVPB 1000 milliGRAM(s) IV Intermittent every 24 hours  glucagon  Injectable 1 milliGRAM(s) IntraMuscular once  glucagon  Injectable 1 milliGRAM(s) IntraMuscular once  heparin  Infusion 1000 Unit(s)/Hr (14 mL/Hr) IV Continuous <Continuous>  influenza  Vaccine (HIGH DOSE) 0.7 milliLiter(s) IntraMuscular once  insulin lispro (ADMELOG) corrective regimen sliding scale   SubCutaneous every 6 hours  loperamide 4 milliGRAM(s) Oral every 8 hours  metoprolol succinate ER 25 milliGRAM(s) Oral daily  mirtazapine 30 milliGRAM(s) Oral at bedtime  multivitamin 1 Tablet(s) Oral daily  opium Tincture 6 milliGRAM(s) Oral every 12 hours  pantoprazole    Tablet 40 milliGRAM(s) Oral two times a day  povidone iodine 10% Solution 1 Application(s) Topical daily  psyllium Powder 1 Packet(s) Oral every 8 hours  zinc sulfate 220 milliGRAM(s) Oral daily    MEDICATIONS  (PRN):  acetaminophen     Tablet .. 650 milliGRAM(s) Oral every 6 hours PRN Temp greater or equal to 38C (100.4F), Mild Pain (1 - 3)  dextrose Oral Gel 15 Gram(s) Oral once PRN Blood Glucose LESS THAN 70 milliGRAM(s)/deciliter  dextrose Oral Gel 15 Gram(s) Oral once PRN Blood Glucose LESS THAN 70 milliGRAM(s)/deciliter  melatonin 3 milliGRAM(s) Oral at bedtime PRN Insomnia  ondansetron Injectable 4 milliGRAM(s) IV Push every 6 hours PRN Nausea and/or Vomiting  sodium chloride 0.9% lock flush 10 milliLiter(s) IV Push every 1 hour PRN Pre/post blood products, medications, blood draw, and to maintain line patency    Allergies    No Known Allergies    Intolerances        VITAL SIGNS:  Vital Signs Last 24 Hrs  T(C): 37.3 (15 Feb 2023 02:05), Max: 37.8 (14 Feb 2023 21:55)  T(F): 99.1 (15 Feb 2023 02:05), Max: 100.1 (14 Feb 2023 21:55)  HR: 86 (15 Feb 2023 04:00) (82 - 118)  BP: 121/66 (15 Feb 2023 04:00) (111/59 - 178/78)  BP(mean): 82 (15 Feb 2023 04:00) (77 - 112)  RR: 20 (15 Feb 2023 04:00) (12 - 33)  SpO2: 100% (15 Feb 2023 04:00) (92% - 100%)    Parameters below as of 15 Feb 2023 04:00  Patient On (Oxygen Delivery Method): room air          02-13-23 @ 07:01  -  02-14-23 @ 07:00  --------------------------------------------------------  IN: 3437 mL / OUT: 2830 mL / NET: 607 mL    02-14-23 @ 07:01  -  02-15-23 @ 06:21  --------------------------------------------------------  IN: 3322 mL / OUT: 3655 mL / NET: -333 mL        PHYSICAL EXAM:  General: NAD, resting comfortably in bed  HEENT: NC/AT, EOMI, PERRLA  Pulmonary: Satting well on RA, b/l breath sounds, no r/r/w  Cardiovascular: NSR, s1 s2, no m/r/g  Abdominal: Soft, NTND, ostomy with yellow liquid stool in the bag  Extremities: Left leg with seropurulent drainage as mentioned above, wrapped with kerlix  Neuro: AAO x3      LABS:                        10.8   12.14 )-----------( 484      ( 15 Feb 2023 05:30 )             34.6     02-14    137  |  104  |  9   ----------------------------<  120<H>  3.6   |  23  |  0.61    Ca    9.0      14 Feb 2023 05:30  Phos  3.0     02-14  Mg     2.0     02-14      PT/INR - ( 14 Feb 2023 16:47 )   PT: 16.4 sec;   INR: 1.37          PTT - ( 15 Feb 2023 05:40 )  PTT:72.2 sec    CAPILLARY BLOOD GLUCOSE      POCT Blood Glucose.: 97 mg/dL (15 Feb 2023 05:34)  POCT Blood Glucose.: 137 mg/dL (14 Feb 2023 23:44)  POCT Blood Glucose.: 89 mg/dL (14 Feb 2023 17:43)  POCT Blood Glucose.: 102 mg/dL (14 Feb 2023 12:15)          RADIOLOGY & ADDITIONAL TESTS: Reviewed.

## 2023-02-15 NOTE — PROGRESS NOTE ADULT - SUBJECTIVE AND OBJECTIVE BOX
ON: Hold entresto for OR. 500cc bolus for ileostomy and low UOP    S: Seen and evaluated in SICU. Resting comfortably in bed. NPO since MN. Intermittently compliant during AM interview.     O: ICU Vital Signs Last 24 Hrs  T(F): 97 (02-15-23 @ 13:00), Max: 100.1 (02-14-23 @ 21:55)  HR: 79 (02-15-23 @ 14:20) (79 - 118)  BP: 108/52 (02-15-23 @ 14:20) (108/52 - 162/70)  BP(mean): 72 (02-15-23 @ 14:20) (72 - 101)  ABP: --  RR: 18 (02-15-23 @ 14:20) (12 - 33)  SpO2: 100% (02-15-23 @ 14:20) (92% - 100%)    PHYSICAL EXAM:   General: NAD, noted to be in mild discomfort due to leg pain  HEENT: NC/AT, EOMI, PERRLA  Pulmonary: B/l breath sounds, nonlabored breathing, no r/r/w  Cardiovascular: S1 s2, NSR, no m/r/g  Abdominal: Soft, NTND, ostomy with yellow liquid stool in bag  Extremities: Left leg wrapped with kerlix, minimally saturated with seropurulent fluid      LABS:    02-15    137  |  106  |  7   ----------------------------<  96  3.4<L>   |  20<L>  |  0.57    Ca    8.8      15 Feb 2023 05:30  Phos  2.8     02-15  Mg     1.8     02-15                          10.8   12.14 )-----------( 484      ( 15 Feb 2023 05:30 )             34.6   PT/INR - ( 14 Feb 2023 16:47 )   PT: 16.4 sec;   INR: 1.37          PTT - ( 15 Feb 2023 05:40 )  PTT:72.2 sec  CAPILLARY BLOOD GLUCOSE      POCT Blood Glucose.: 82 mg/dL (15 Feb 2023 13:28)  POCT Blood Glucose.: 97 mg/dL (15 Feb 2023 05:34)  POCT Blood Glucose.: 137 mg/dL (14 Feb 2023 23:44)  POCT Blood Glucose.: 89 mg/dL (14 Feb 2023 17:43)    MEDICATIONS  (STANDING):  aMIOdarone    Tablet 100 milliGRAM(s) Oral every 24 hours  ascorbic acid 500 milliGRAM(s) Oral daily  chlorhexidine 2% Cloths 1 Application(s) Topical <User Schedule>  cholestyramine Powder (Sugar-Free) 2 Gram(s) Oral three times a day  DAPTOmycin IVPB      DAPTOmycin IVPB 500 milliGRAM(s) IV Intermittent every 24 hours  dextrose 5% + lactated ringers. 1000 milliLiter(s) (100 mL/Hr) IV Continuous <Continuous>  dextrose 5%. 1000 milliLiter(s) (100 mL/Hr) IV Continuous <Continuous>  dextrose 5%. 1000 milliLiter(s) (50 mL/Hr) IV Continuous <Continuous>  dextrose 5%. 1000 milliLiter(s) (50 mL/Hr) IV Continuous <Continuous>  dextrose 5%. 1000 milliLiter(s) (100 mL/Hr) IV Continuous <Continuous>  dextrose 50% Injectable 25 Gram(s) IV Push once  dextrose 50% Injectable 12.5 Gram(s) IV Push once  dextrose 50% Injectable 25 Gram(s) IV Push once  diphenoxylate/atropine 2 Tablet(s) Oral every 8 hours  ertapenem  IVPB 1000 milliGRAM(s) IV Intermittent every 24 hours  glucagon  Injectable 1 milliGRAM(s) IntraMuscular once  glucagon  Injectable 1 milliGRAM(s) IntraMuscular once  heparin  Infusion 1000 Unit(s)/Hr (14 mL/Hr) IV Continuous <Continuous>  influenza  Vaccine (HIGH DOSE) 0.7 milliLiter(s) IntraMuscular once  insulin lispro (ADMELOG) corrective regimen sliding scale   SubCutaneous every 6 hours  loperamide 4 milliGRAM(s) Oral every 8 hours  metoprolol succinate ER 25 milliGRAM(s) Oral daily  mirtazapine 30 milliGRAM(s) Oral at bedtime  multivitamin 1 Tablet(s) Oral daily  opium Tincture 6 milliGRAM(s) Oral every 12 hours  pantoprazole    Tablet 40 milliGRAM(s) Oral two times a day  povidone iodine 10% Solution 1 Application(s) Topical daily  psyllium Powder 1 Packet(s) Oral every 8 hours  zinc sulfate 220 milliGRAM(s) Oral daily    MEDICATIONS  (PRN):  acetaminophen     Tablet .. 650 milliGRAM(s) Oral every 6 hours PRN Temp greater or equal to 38C (100.4F), Mild Pain (1 - 3)  dextrose Oral Gel 15 Gram(s) Oral once PRN Blood Glucose LESS THAN 70 milliGRAM(s)/deciliter  dextrose Oral Gel 15 Gram(s) Oral once PRN Blood Glucose LESS THAN 70 milliGRAM(s)/deciliter  melatonin 3 milliGRAM(s) Oral at bedtime PRN Insomnia  ondansetron Injectable 4 milliGRAM(s) IV Push every 6 hours PRN Nausea and/or Vomiting  sodium chloride 0.9% lock flush 10 milliLiter(s) IV Push every 1 hour PRN Pre/post blood products, medications, blood draw, and to maintain line patency      Uriarte:	  [ ] None	[ ] Daily Uriarte Order Placed	   Indication:	  [ ] Strict I and O's    [ ] Obstruction     [ ] Incontinence + Stage 3 or 4 Decubitus  Central Line:  [ ] None	   [ ]  Medication / TPN Administration     [ ] No Peripheral IV

## 2023-02-15 NOTE — PRE-OP CHECKLIST - SELECT TESTS ORDERED
BMP/CBC/CMP/PT/PTT/INR/Hepatic Function/Type and Cross/Type and Screen/EKG/CXR/COVID-19
BMP/CBC/CMP/PT/PTT/INR/Type and Screen/POCT Blood Glucose/COVID-19

## 2023-02-15 NOTE — CHART NOTE - NSCHARTNOTEFT_GEN_A_CORE
Admitting Diagnosis:   Patient is a 78y old  Male who presents with a chief complaint of A flutter (15 Feb 2023 08:20)      PAST MEDICAL & SURGICAL HISTORY:      Current Nutrition Order:  NPO MN for L. AKA    PO Intake: Good (%) [   ]  Fair (50-75%) [   ] Poor (<25%) [   ] - N/A NPO    GI Issues:   Pt denied complaints of N/V  Ileostomy w/3435mL output x 24hrs   No abdominal discomfort or distention noted    Pain:   Pt denied complaints of pain this AM    Skin Integrity:  Surgical incision noted  Buddy: 14  No edema noted  pressure ulcer: sacral spine stage IV pressure ulcer, unstageable upper back pressure ulcer  *pt has been ordered MVI, vitamin C and zinc sulfate*     Labs:   02-15    137  |  106  |  7   ----------------------------<  96  3.4<L>   |  20<L>  |  0.57    Ca    8.8      15 Feb 2023 05:30  Phos  2.8     02-15  Mg     1.8     02-15      CAPILLARY BLOOD GLUCOSE      POCT Blood Glucose.: 97 mg/dL (15 Feb 2023 05:34)  POCT Blood Glucose.: 137 mg/dL (2023 23:44)  POCT Blood Glucose.: 89 mg/dL (2023 17:43)      Medications:  MEDICATIONS  (STANDING):  aMIOdarone    Tablet 100 milliGRAM(s) Oral every 24 hours  ascorbic acid 500 milliGRAM(s) Oral daily  chlorhexidine 2% Cloths 1 Application(s) Topical <User Schedule>  cholestyramine Powder (Sugar-Free) 2 Gram(s) Oral three times a day  DAPTOmycin IVPB 500 milliGRAM(s) IV Intermittent every 24 hours  DAPTOmycin IVPB      dextrose 5% + lactated ringers. 1000 milliLiter(s) (100 mL/Hr) IV Continuous <Continuous>  dextrose 5%. 1000 milliLiter(s) (100 mL/Hr) IV Continuous <Continuous>  dextrose 5%. 1000 milliLiter(s) (50 mL/Hr) IV Continuous <Continuous>  dextrose 5%. 1000 milliLiter(s) (50 mL/Hr) IV Continuous <Continuous>  dextrose 5%. 1000 milliLiter(s) (100 mL/Hr) IV Continuous <Continuous>  dextrose 50% Injectable 25 Gram(s) IV Push once  dextrose 50% Injectable 12.5 Gram(s) IV Push once  dextrose 50% Injectable 25 Gram(s) IV Push once  diphenoxylate/atropine 2 Tablet(s) Oral every 8 hours  ertapenem  IVPB 1000 milliGRAM(s) IV Intermittent every 24 hours  glucagon  Injectable 1 milliGRAM(s) IntraMuscular once  glucagon  Injectable 1 milliGRAM(s) IntraMuscular once  heparin  Infusion 1000 Unit(s)/Hr (14 mL/Hr) IV Continuous <Continuous>  influenza  Vaccine (HIGH DOSE) 0.7 milliLiter(s) IntraMuscular once  insulin lispro (ADMELOG) corrective regimen sliding scale   SubCutaneous every 6 hours  loperamide 4 milliGRAM(s) Oral every 8 hours  metoprolol succinate ER 25 milliGRAM(s) Oral daily  mirtazapine 30 milliGRAM(s) Oral at bedtime  multivitamin 1 Tablet(s) Oral daily  opium Tincture 6 milliGRAM(s) Oral every 12 hours  pantoprazole    Tablet 40 milliGRAM(s) Oral two times a day  povidone iodine 10% Solution 1 Application(s) Topical daily  psyllium Powder 1 Packet(s) Oral every 8 hours  zinc sulfate 220 milliGRAM(s) Oral daily    MEDICATIONS  (PRN):  acetaminophen     Tablet .. 650 milliGRAM(s) Oral every 6 hours PRN Temp greater or equal to 38C (100.4F), Mild Pain (1 - 3)  dextrose Oral Gel 15 Gram(s) Oral once PRN Blood Glucose LESS THAN 70 milliGRAM(s)/deciliter  dextrose Oral Gel 15 Gram(s) Oral once PRN Blood Glucose LESS THAN 70 milliGRAM(s)/deciliter  melatonin 3 milliGRAM(s) Oral at bedtime PRN Insomnia  ondansetron Injectable 4 milliGRAM(s) IV Push every 6 hours PRN Nausea and/or Vomiting  sodium chloride 0.9% lock flush 10 milliLiter(s) IV Push every 1 hour PRN Pre/post blood products, medications, blood draw, and to maintain line patency      Admitting Anthropometrics:    pounds +-10%  Wt 142 pounds BMI 16.4 %IBW=66%     Weight Change:   2/15 141.7 pounds   141.9 pounds - dosing wt    136 pounds - Bedscale wt     **PCM:  >> Reports having 1-2 meals/day + ONS. Per pt claims to have 2 ensure/day and 2 nutrament/day - unclear how accurate this is. ?If pt meeting ~75% EER consistently.  >> Does report wt loss.  pounds x6 months ago. Thinks he now is 135 pounds which is consistent with bedscale wt obtained during initial visit by  pounds. Suggest wt loss of 49 pounds/26% body wt loss.  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     Estimated energy needs:  Current body wt for EER based on clinician judgment. Adjust for age, malnutrition, EF, S/p OR, Pressure ulcer; fluids per team or as recommended below  25-30kcal/k-1950kcal/day   1.4-1.6gm/k-104gm prot/day   30-35kcal/k-2275kcal/day   **Rec upper end of needs     Subjective:  77M with PMHx of IVDU found unresponsive at his nursing home, resolved after Narcan in the field and brought to [facility]. Found to have PE, atrial flutter, and large LV thrombus on echo. Transferred to [facility] for further management. Pt c/o abdominal pain on 12/10 CTA showing mid SMA with embolus. Abnormal distal small bowel loops and cecum with dilatation and pneumatosis suggesting infarcted bowel. One or two tiny foci of intrahepatic portal vein pneumatosis. Segmental infarction upper pole left kidney. Now s/p Ex lap, SMA embolectomy, 80cm SBR, abthera vac left in discontinuity () and transferred to SICU intubated. S/p second look () and most recently s/p OR for 3rd look, end-to-end anastomosis of remaining bowel, loop ileostomy and abdomen closure (12/15). Transferred to CCU on  for cardiac optimization and now transferred back to SICU  for bleeding hemodynamic instability. Echo  shows EF 10-15% with overall hypokinesis. CTA performed demonstrating large hematoma in R abdomen, new hemoperitoneum, and bilateral pleural effusions. Remains in SICU, now extubated with still with limb ischemia to LLE pending amputation and AC held given BRBPR and hematoma; noted pt agreeable for AKA when feasible - AKA currently Pending Cards. Pt s/p DCCV on  (negative LORENZO on ) with successful conversion to NSR. Planning for AKA with vascular surgery once nutrition status is optimize. Team placed PICC 1/10 and initiated supplemental TPN now weaned off with constant encouragement of PO intake. CT A/P  showing RLQ fluid collection. PPN held 2/2 bacteremia.     Pt seen in room, awake, alert. Pt did not want to talk with RD much, but was requesting peanut butter. Pt currently NPO for L. AKA- explained purpose of NPO to pt. D5LR running at 100cc/hr. Pt denied N/V. Ostomy continues to have massive output of >3400ml/day- ordered for lomotil, opium tincture, imodium, and psyllium. Afebrile. Pt continues to struggle to become nutritionally optimized, would recommend resuming TPN/PPN in addition to PO intake once feasible, to ensure appropriate wound healing after surgery. Will continue to follow per RD protocol.       Prior PES: Malnutrition Severe in Chronic state RT presumed intake<EER AEB NFPE, wt loss, PO intake  >>on going  Goal: Pt will meet at least 75% of nutrient needs via feasible route / Show no further s/s of malnutrition    Recommendations:  1. Cont with NPO status and advance diet as medically feasible/tolerated >>CLD >>FLD >> Regular diet with Ensure Max TID (450kcal, 90g protein)  2. Due to persistent malabsorption, recommend resuming TPN as medically feasible/appropriate. Recommend; 275g Dex, 90g AA, 50g SMOF lipids to provide 1795 kcal, 90g pro, GIR 2.93, 1.38 g/kg pro ABW. RD to follow.  >> Cont to trend TG weekly  3. Recommend continue MVI daily  4. Monitor Skin, Wts daily, GOC. Pain/GI per team.   >>Cont with opium tincture, imodium and metamucil per team  5. Monitor lytes and replete prn. POC BG q6hrs     Education:  Deferred at time of assessment as pt agitated. RD to continue to encourage adequate PO intake to prevent further s/sx of malnutrition/ healthy weight gain    Risk Level: High [ X ] Moderate [   ] Low [   ].

## 2023-02-15 NOTE — PROGRESS NOTE ADULT - ASSESSMENT
78M h/o prolonged hospital admission, mesenteric ischemia s/p SBR 12/2022 c/b intraabdominal hematoma/abscess, LE gangrene pending possible AKA, now found to be severe sepsis 2/2 polymicrobial bcteremia (ESBL K.pneumo, VRE), likely 2/2 undrained intraabdominal abscess.  CT c/a/p showed only slightly decreased abscess size.  Team spoke to IR for possible drainage, as he is very high risk for surgical washout.    - cont daptomycin 500mg IV q24h  - cont ertapenem 1g IV q24h  - BCx 2/10 growing ESBL Kleb pneumo and VRE, most likely 2/2 undrained abdominal abcesses, BCx 2/11 NGTD 48h  - Follow up with surgery regarding drainage plan   - CT A/P 2/12 with 7.4x5.5x7.7 cm loculated fluid collected in RLQ    ID Team 1 will continue to follow. Note not final until attending attestation available.    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  CT c/a/p showed only slightly decreased abscess size.  Team spoke to IR for possible drainage, as he is very high risk for surgical washout. Patient now pending left AKA.     - cont daptomycin 500mg IV q24h  - cont ertapenem 1g IV q24h  - BCx 2/10 growing ESBL Kleb pneumo and VRE, most likely 2/2 undrained abdominal abcesses, BCx 2/11 NGTD 48h  - Follow up with surgery regarding drainage plan   - CT A/P 2/12 with 7.4x5.5x7.7 cm loculated fluid collected in RLQ    ID Team 1 will sign off and ID Team 2 will start following patient on 2/16. Note not final until attending attestation available.    Case discussed with ID attending Dr Guzman

## 2023-02-15 NOTE — PROGRESS NOTE ADULT - ASSESSMENT
77M with PMHx of IVDU found unresponsive at his nursing home, resolved after Narcan in the field and brought to [facility]. Found to have PE, atrial flutter, and large LV thrombus on echo. Transferred to [facility] for further management. Pt c/o abdominal pain on 12/10 CTA showing mid SMA with embolus. Abnormal distal small bowel loops and cecum with dilatation and pneumatosis suggesting infarcted bowel. One or two tiny foci of intrahepatic portal vein pneumatosis. Segmental infarction upper pole left kidney. Now s/p Ex lap, SMA embolectomy, 80cm SBR, abthera vac left in discontinuity (12/11) and transferred to SICU intubated. S/p second look (12/13) and most recently s/p OR for 3rd look, end-to-end anastomosis of remaining bowel, loop ileostomy and abdomen closure (12/15). Now stepped down to telemtry. LLE ischemia, AKA delayed by nutritional optimization. Transferred to SICU in the setting of sepsis for HD monitoring. On am rounds odor noted in patient's room, left leg dressing taken down, and approximately 50-60cc of seropurulent fluid drained from it, probed with qtip and additional fluid drained; plan for aka with vascular on 02/15.    Plan:    Neuro: No active issues   CV: Hypotension 2/2 sepsis/hypovolemia. Spirolactone held. IV abx started. Hypovolemic: Bolus as needed. D5LR started at 150/hr. Repeat echo with EF 55-60%. Mildly dilated RV. Maintain MAP >65. A.Fib/Flutter: s/p cardioversion on 12/30. Continue metoprolol/amiodarone   Pulm: Sating well on nasal canula   GI/FEN: High Ostomy output. Replete. Continue Immodium/Tincture of Opium. PPN discontinued in the setting of bacteremia. D5LR started. NPO @mn for OR (02/15)  : Condom cath in place. Strict I and O.   Endo: mISS  Heme: Anemia H/H 7.4/22.5 s/p 2uPRBC (02/11). LV thrombis w/ LLE ischemia. SQL on hold for possible procedure. Heparin drip started. Active T&S;   ID: Sepsis 2/2 K. Pneumoniae/Enterococcus VRE Bacteremia: Undrainable abscess in Abd, _ LLExt ischemia with associated infection: AKA on 02/15.Cont: Luis (2/10--) (Dapto 2/11--)   dc//:: Zosyn. Vanc (2/10--2/11).  PPX: SCDs  L/D/T: PIV  PT/OT: Not ordered  Dispo: SICU, [facility]

## 2023-02-15 NOTE — PROGRESS NOTE ADULT - SUBJECTIVE AND OBJECTIVE BOX
Vascular Surgery Post-Op Note    Procedure:  Left above knee amputation    Diagnosis/Indication:  Disorder of Circulatory System     Surgeon: Jack Dean MD & Avinash Steel MD    S: Pt has no complaints. Denies CP, SOB, ABEL, calf tenderness. Pain controlled with medication.    O:  T(C): 36.8 (02-15-23 @ 17:38), Max: 36.8 (02-15-23 @ 17:38)  T(F): 98.3 (02-15-23 @ 17:38), Max: 98.3 (02-15-23 @ 17:38)  HR: 88 (02-15-23 @ 19:30) (87 - 99)  BP: 90/70 (02-15-23 @ 19:30) (90/70 - 99/60)  RR: 18 (02-15-23 @ 19:30) (17 - 20)  SpO2: 98% (02-15-23 @ 19:30) (96% - 100%)  Wt(kg): --                        10.8   12.14 )-----------( 484      ( 15 Feb 2023 05:30 )             34.6     02-15    137  |  106  |  7   ----------------------------<  96  3.4<L>   |  20<L>  |  0.57    Ca    8.8      15 Feb 2023 05:30  Phos  2.8     02-15  Mg     1.8     02-15        Gen: NAD, resting comfortably in bed  C/V: NSR  Pulm: Nonlabored breathing, no respiratory distress  Abd: soft, NT/ND  Extrem: L AKA site with surgical dressing C/D/I, appropriately TTP, RLE WWP, no calf edema, SCD in place to RLE      A/P: 78M with PMHx of IVDU found unresponsive at his nursing home, resolved after Narcan in the field and brought to [facility]. Found to have PE, atrial flutter, and large LV thrombus on ECHOand CTA showing mid SMA with embolus s/p Ex lap, SMA embolectomy, 80cm SBR, abthera vac left in discontinuity (12/11) and transferred to SICU intubated. S/p second look (12/13) and most recently s/p OR for 3rd look, end-to-end anastomosis of remaining bowel, loop ileostomy and abdomen closure (12/15). Now stepped down to telemtry. LLE ischemia, AKA delayed by nutritional optimization. Transferred to SICU in the setting of sepsis for HD monitoring. Now POD 0 s/p guillotine L AKA.    - Maintain surgical dressing for 48-72 hours post-op, to be changed by vascular team  - Resume heparin gtt at midnight  - Plan for RTOR for closure of L AKA  - Remainder of care per primary team  - Vascular will continue to follow    Darío Mccarthy PA-C  Vascular Surgery   Vascular Surgery Post-Op Note    Procedure:  Left above knee amputation    Diagnosis/Indication:  Disorder of Circulatory System     Surgeon: Jack Dean MD & Avinash Steel MD    S: Pt has no complaints. Denies CP, SOB, ABEL, calf tenderness. Pain controlled with medication.    O:  T(C): 36.8 (02-15-23 @ 17:38), Max: 36.8 (02-15-23 @ 17:38)  T(F): 98.3 (02-15-23 @ 17:38), Max: 98.3 (02-15-23 @ 17:38)  HR: 88 (02-15-23 @ 19:30) (87 - 99)  BP: 90/70 (02-15-23 @ 19:30) (90/70 - 99/60)  RR: 18 (02-15-23 @ 19:30) (17 - 20)  SpO2: 98% (02-15-23 @ 19:30) (96% - 100%)  Wt(kg): --                        10.8   12.14 )-----------( 484      ( 15 Feb 2023 05:30 )             34.6     02-15    137  |  106  |  7   ----------------------------<  96  3.4<L>   |  20<L>  |  0.57    Ca    8.8      15 Feb 2023 05:30  Phos  2.8     02-15  Mg     1.8     02-15        Gen: NAD, resting comfortably in bed  C/V: NSR  Pulm: Nonlabored breathing, no respiratory distress  Abd: soft, NT/ND  Extrem: L AKA site with surgical dressing saturated so external dressing changed, appropriately TTP, RLE WWP, no calf edema, SCD in place to RLE      A/P: 78M with PMHx of IVDU found unresponsive at his nursing home, resolved after Narcan in the field and brought to [facility]. Found to have PE, atrial flutter, and large LV thrombus on ECHOand CTA showing mid SMA with embolus s/p Ex lap, SMA embolectomy, 80cm SBR, abthera vac left in discontinuity (12/11) and transferred to SICU intubated. S/p second look (12/13) and most recently s/p OR for 3rd look, end-to-end anastomosis of remaining bowel, loop ileostomy and abdomen closure (12/15). Now stepped down to telemtry. LLE ischemia, AKA delayed by nutritional optimization. Transferred to SICU in the setting of sepsis for HD monitoring. Now POD 0 s/p guillotine L AKA.    - Maintain surgical dressing for 48-72 hours post-op, to be changed by vascular team  - Resume heparin gtt at midnight, monitor for worsening bleeding  - Plan for RTOR for closure of L AKA  - Remainder of care per primary team  - Vascular will continue to follow    Darío Mccarthy PA-C  Vascular Surgery

## 2023-02-15 NOTE — PROGRESS NOTE ADULT - ATTENDING COMMENTS
Agree with above.  Patient for AKA today which should remove necrotic LLE as source of infection.   However, I'm still concerned the abdominal abscess is the source of the bacteremia given that both bacteria that were in the blood stream are enteric pathogens.  Continue Daptomycin and Ertapenem for now.      ID team 1 will follow today.  Starting on 2/16/23, the patient will be followed by Dr. Stewart (Team 2)

## 2023-02-15 NOTE — PROGRESS NOTE ADULT - SUBJECTIVE AND OBJECTIVE BOX
ON: Hold entresto for OR. 500cc bolus for ileostomy and low UOP  2/14: PTT: 70. Upon exam this morning odor noted in the room, left leg dressing removed, and expressed approximately 50cc of seropurulent fluid from left leg; rewrapped with kerlix. COVID -. NPO @mn. OR tomorrow for AKA    SUBJECTIVE:    MEDICATIONS  (STANDING):  aMIOdarone    Tablet 100 milliGRAM(s) Oral every 24 hours  ascorbic acid 500 milliGRAM(s) Oral daily  chlorhexidine 2% Cloths 1 Application(s) Topical <User Schedule>  cholestyramine Powder (Sugar-Free) 2 Gram(s) Oral three times a day  DAPTOmycin IVPB      DAPTOmycin IVPB 500 milliGRAM(s) IV Intermittent every 24 hours  dextrose 5% + lactated ringers. 1000 milliLiter(s) (100 mL/Hr) IV Continuous <Continuous>  dextrose 5%. 1000 milliLiter(s) (100 mL/Hr) IV Continuous <Continuous>  dextrose 5%. 1000 milliLiter(s) (50 mL/Hr) IV Continuous <Continuous>  dextrose 5%. 1000 milliLiter(s) (50 mL/Hr) IV Continuous <Continuous>  dextrose 5%. 1000 milliLiter(s) (100 mL/Hr) IV Continuous <Continuous>  dextrose 50% Injectable 25 Gram(s) IV Push once  dextrose 50% Injectable 12.5 Gram(s) IV Push once  dextrose 50% Injectable 25 Gram(s) IV Push once  diphenoxylate/atropine 2 Tablet(s) Oral every 8 hours  ertapenem  IVPB 1000 milliGRAM(s) IV Intermittent every 24 hours  glucagon  Injectable 1 milliGRAM(s) IntraMuscular once  glucagon  Injectable 1 milliGRAM(s) IntraMuscular once  heparin  Infusion 1000 Unit(s)/Hr (14 mL/Hr) IV Continuous <Continuous>  influenza  Vaccine (HIGH DOSE) 0.7 milliLiter(s) IntraMuscular once  insulin lispro (ADMELOG) corrective regimen sliding scale   SubCutaneous every 6 hours  loperamide 4 milliGRAM(s) Oral every 8 hours  magnesium sulfate  IVPB 1 Gram(s) IV Intermittent once  metoprolol succinate ER 25 milliGRAM(s) Oral daily  mirtazapine 30 milliGRAM(s) Oral at bedtime  multivitamin 1 Tablet(s) Oral daily  opium Tincture 6 milliGRAM(s) Oral every 12 hours  pantoprazole    Tablet 40 milliGRAM(s) Oral two times a day  potassium chloride    Tablet ER 40 milliEquivalent(s) Oral once  povidone iodine 10% Solution 1 Application(s) Topical daily  psyllium Powder 1 Packet(s) Oral every 8 hours  zinc sulfate 220 milliGRAM(s) Oral daily    MEDICATIONS  (PRN):  acetaminophen     Tablet .. 650 milliGRAM(s) Oral every 6 hours PRN Temp greater or equal to 38C (100.4F), Mild Pain (1 - 3)  dextrose Oral Gel 15 Gram(s) Oral once PRN Blood Glucose LESS THAN 70 milliGRAM(s)/deciliter  dextrose Oral Gel 15 Gram(s) Oral once PRN Blood Glucose LESS THAN 70 milliGRAM(s)/deciliter  melatonin 3 milliGRAM(s) Oral at bedtime PRN Insomnia  ondansetron Injectable 4 milliGRAM(s) IV Push every 6 hours PRN Nausea and/or Vomiting  sodium chloride 0.9% lock flush 10 milliLiter(s) IV Push every 1 hour PRN Pre/post blood products, medications, blood draw, and to maintain line patency      Drips:     ICU Vital Signs Last 24 Hrs  T(C): 36.6 (15 Feb 2023 06:05), Max: 37.8 (14 Feb 2023 21:55)  T(F): 97.9 (15 Feb 2023 06:05), Max: 100.1 (14 Feb 2023 21:55)  HR: 84 (15 Feb 2023 07:00) (82 - 118)  BP: 139/63 (15 Feb 2023 07:00) (111/59 - 178/78)  BP(mean): 90 (15 Feb 2023 07:00) (77 - 112)  ABP: --  ABP(mean): --  RR: 23 (15 Feb 2023 07:00) (12 - 33)  SpO2: 100% (15 Feb 2023 07:00) (92% - 100%)    O2 Parameters below as of 15 Feb 2023 07:00  Patient On (Oxygen Delivery Method): room air            Physical Exam:  General: NAD  HEENT: NC/AT, EOMI, PERRLA, normal hearing, no oral lesions, neck supple w/o LAD  Pulmonary: Nonlabored breathing, no respiratory distress, CTA-B  Cardiovascular: NSR, no murmurs  Abdominal: soft, NT/ND, +BS, no organomegaly  Extremities: WWP, 5/5 strength x 4, no clubbing/cyanosis/edema  Neuro: A/O x3, CNs II-XII grossly intact, normal motor/sensation, no focal deficits  Pulses: palpable distal pulses    Lines/tubes/drains:  Uriarte:	      Vent settings:      I&O's Summary    14 Feb 2023 07:01  -  15 Feb 2023 07:00  --------------------------------------------------------  IN: 3936 mL / OUT: 3955 mL / NET: -19 mL    15 Feb 2023 07:01  -  15 Feb 2023 08:20  --------------------------------------------------------  IN: 514 mL / OUT: 0 mL / NET: 514 mL        LABS:                        10.8   12.14 )-----------( 484      ( 15 Feb 2023 05:30 )             34.6     02-15    137  |  106  |  7   ----------------------------<  96  3.4<L>   |  20<L>  |  0.57    Ca    8.8      15 Feb 2023 05:30  Phos  2.8     02-15  Mg     1.8     02-15      PT/INR - ( 14 Feb 2023 16:47 )   PT: 16.4 sec;   INR: 1.37          PTT - ( 15 Feb 2023 05:40 )  PTT:72.2 sec    CAPILLARY BLOOD GLUCOSE      POCT Blood Glucose.: 97 mg/dL (15 Feb 2023 05:34)  POCT Blood Glucose.: 137 mg/dL (14 Feb 2023 23:44)  POCT Blood Glucose.: 89 mg/dL (14 Feb 2023 17:43)  POCT Blood Glucose.: 102 mg/dL (14 Feb 2023 12:15)        Cultures:    RADIOLOGY & ADDITIONAL STUDIES:     ON: Hold entresto for OR. 500cc bolus for ileostomy and low UOP  2/14: PTT: 70. Upon exam this morning odor noted in the room, left leg dressing removed, and expressed approximately 50cc of seropurulent fluid from left leg; rewrapped with kerlix. COVID -. NPO @mn. OR tomorrow for AKA    SUBJECTIVE: No events overnight, c/o moderate left leg pain, indicates that wants to eat, explained to pt plan for OR today.    MEDICATIONS  (STANDING):  aMIOdarone    Tablet 100 milliGRAM(s) Oral every 24 hours  ascorbic acid 500 milliGRAM(s) Oral daily  chlorhexidine 2% Cloths 1 Application(s) Topical <User Schedule>  cholestyramine Powder (Sugar-Free) 2 Gram(s) Oral three times a day  DAPTOmycin IVPB      DAPTOmycin IVPB 500 milliGRAM(s) IV Intermittent every 24 hours  dextrose 5% + lactated ringers. 1000 milliLiter(s) (100 mL/Hr) IV Continuous <Continuous>  dextrose 5%. 1000 milliLiter(s) (100 mL/Hr) IV Continuous <Continuous>  dextrose 5%. 1000 milliLiter(s) (50 mL/Hr) IV Continuous <Continuous>  dextrose 5%. 1000 milliLiter(s) (50 mL/Hr) IV Continuous <Continuous>  dextrose 5%. 1000 milliLiter(s) (100 mL/Hr) IV Continuous <Continuous>  dextrose 50% Injectable 25 Gram(s) IV Push once  dextrose 50% Injectable 12.5 Gram(s) IV Push once  dextrose 50% Injectable 25 Gram(s) IV Push once  diphenoxylate/atropine 2 Tablet(s) Oral every 8 hours  ertapenem  IVPB 1000 milliGRAM(s) IV Intermittent every 24 hours  glucagon  Injectable 1 milliGRAM(s) IntraMuscular once  glucagon  Injectable 1 milliGRAM(s) IntraMuscular once  heparin  Infusion 1000 Unit(s)/Hr (14 mL/Hr) IV Continuous <Continuous>  influenza  Vaccine (HIGH DOSE) 0.7 milliLiter(s) IntraMuscular once  insulin lispro (ADMELOG) corrective regimen sliding scale   SubCutaneous every 6 hours  loperamide 4 milliGRAM(s) Oral every 8 hours  magnesium sulfate  IVPB 1 Gram(s) IV Intermittent once  metoprolol succinate ER 25 milliGRAM(s) Oral daily  mirtazapine 30 milliGRAM(s) Oral at bedtime  multivitamin 1 Tablet(s) Oral daily  opium Tincture 6 milliGRAM(s) Oral every 12 hours  pantoprazole    Tablet 40 milliGRAM(s) Oral two times a day  potassium chloride    Tablet ER 40 milliEquivalent(s) Oral once  povidone iodine 10% Solution 1 Application(s) Topical daily  psyllium Powder 1 Packet(s) Oral every 8 hours  zinc sulfate 220 milliGRAM(s) Oral daily    MEDICATIONS  (PRN):  acetaminophen     Tablet .. 650 milliGRAM(s) Oral every 6 hours PRN Temp greater or equal to 38C (100.4F), Mild Pain (1 - 3)  dextrose Oral Gel 15 Gram(s) Oral once PRN Blood Glucose LESS THAN 70 milliGRAM(s)/deciliter  dextrose Oral Gel 15 Gram(s) Oral once PRN Blood Glucose LESS THAN 70 milliGRAM(s)/deciliter  melatonin 3 milliGRAM(s) Oral at bedtime PRN Insomnia  ondansetron Injectable 4 milliGRAM(s) IV Push every 6 hours PRN Nausea and/or Vomiting  sodium chloride 0.9% lock flush 10 milliLiter(s) IV Push every 1 hour PRN Pre/post blood products, medications, blood draw, and to maintain line patency      Drips:     ICU Vital Signs Last 24 Hrs  T(C): 36.6 (15 Feb 2023 06:05), Max: 37.8 (14 Feb 2023 21:55)  T(F): 97.9 (15 Feb 2023 06:05), Max: 100.1 (14 Feb 2023 21:55)  HR: 84 (15 Feb 2023 07:00) (82 - 118)  BP: 139/63 (15 Feb 2023 07:00) (111/59 - 178/78)  BP(mean): 90 (15 Feb 2023 07:00) (77 - 112)  ABP: --  ABP(mean): --  RR: 23 (15 Feb 2023 07:00) (12 - 33)  SpO2: 100% (15 Feb 2023 07:00) (92% - 100%)    O2 Parameters below as of 15 Feb 2023 07:00  Patient On (Oxygen Delivery Method): room air            Physical Exam:    General: NAD, noted to be in mild discomfort due to leg pain  HEENT: NC/AT, EOMI, PERRLA  Pulmonary: B/l breath sounds, nonlabored breathing, no r/r/w  Cardiovascular: S1 s2, NSR, no m/r/g  Abdominal: Soft, NTND, ostomy with yellow liquid stool in bag  Extremities: Left leg wrapped with kerlix, minimally saturated with seropurulent fluid  Neuro: Altered due to pain and insisting on food    Lines/tubes/drains:  Uriarte:	      Shelby settings:      I&O's Summary    14 Feb 2023 07:01  -  15 Feb 2023 07:00  --------------------------------------------------------  IN: 3936 mL / OUT: 3955 mL / NET: -19 mL    15 Feb 2023 07:01  -  15 Feb 2023 08:20  --------------------------------------------------------  IN: 514 mL / OUT: 0 mL / NET: 514 mL        LABS:                        10.8   12.14 )-----------( 484      ( 15 Feb 2023 05:30 )             34.6     02-15    137  |  106  |  7   ----------------------------<  96  3.4<L>   |  20<L>  |  0.57    Ca    8.8      15 Feb 2023 05:30  Phos  2.8     02-15  Mg     1.8     02-15      PT/INR - ( 14 Feb 2023 16:47 )   PT: 16.4 sec;   INR: 1.37          PTT - ( 15 Feb 2023 05:40 )  PTT:72.2 sec    CAPILLARY BLOOD GLUCOSE      POCT Blood Glucose.: 97 mg/dL (15 Feb 2023 05:34)  POCT Blood Glucose.: 137 mg/dL (14 Feb 2023 23:44)  POCT Blood Glucose.: 89 mg/dL (14 Feb 2023 17:43)  POCT Blood Glucose.: 102 mg/dL (14 Feb 2023 12:15)        Cultures:    RADIOLOGY & ADDITIONAL STUDIES:

## 2023-02-15 NOTE — PROGRESS NOTE ADULT - ASSESSMENT
77M with PMHx of IVDU found unresponsive at his nursing home, resolved after Narcan in the field and brought to [facility]. Found to have PE, atrial flutter, and large LV thrombus on echo and CTA showing mid SMA with embolus s/p Ex lap, SMA embolectomy, 80cm SBR, abthera vac left in discontinuity (12/11) and transferred to SICU intubated. S/p second look (12/13) and most recently s/p OR for 3rd look, end-to-end anastomosis of remaining bowel, loop ileostomy and abdomen closure (12/15). Now stepped down to telemtry. LLE ischemia, AKA delayed by nutritional optimization. Transferred to SICU in the setting of sepsis for HD monitoring.    OR today with vascular for LLE AKA  NPO/IVF  Hep gtt  Abx per ID- ertapenem, daptomycin  Strict I&Os  Replete ileostomy output with Additional 500cc LR  Further management per SICU  Discussed with surgery attending.

## 2023-02-16 NOTE — PROGRESS NOTE ADULT - SUBJECTIVE AND OBJECTIVE BOX
SUBJECTIVE:  Doing well this AM. NAEON. Denies cp/sob. Denies lightheadedness/ dizziness. Pain is well controlled. Tolerating diet.    MEDICATIONS  (STANDING):  acetaminophen     Tablet .. 975 milliGRAM(s) Oral once  aMIOdarone    Tablet 100 milliGRAM(s) Oral every 24 hours  ascorbic acid 500 milliGRAM(s) Oral daily  chlorhexidine 2% Cloths 1 Application(s) Topical <User Schedule>  cholestyramine Powder (Sugar-Free) 2 Gram(s) Oral three times a day  DAPTOmycin IVPB      DAPTOmycin IVPB 500 milliGRAM(s) IV Intermittent every 24 hours  dextrose 5% + lactated ringers. 1000 milliLiter(s) (100 mL/Hr) IV Continuous <Continuous>  dextrose 5%. 1000 milliLiter(s) (100 mL/Hr) IV Continuous <Continuous>  dextrose 5%. 1000 milliLiter(s) (50 mL/Hr) IV Continuous <Continuous>  dextrose 5%. 1000 milliLiter(s) (50 mL/Hr) IV Continuous <Continuous>  dextrose 5%. 1000 milliLiter(s) (100 mL/Hr) IV Continuous <Continuous>  dextrose 50% Injectable 25 Gram(s) IV Push once  dextrose 50% Injectable 12.5 Gram(s) IV Push once  dextrose 50% Injectable 25 Gram(s) IV Push once  diphenoxylate/atropine 2 Tablet(s) Oral every 8 hours  ertapenem  IVPB 1000 milliGRAM(s) IV Intermittent every 24 hours  glucagon  Injectable 1 milliGRAM(s) IntraMuscular once  glucagon  Injectable 1 milliGRAM(s) IntraMuscular once  heparin  Infusion 1400 Unit(s)/Hr (14 mL/Hr) IV Continuous <Continuous>  HYDROmorphone  Injectable 0.5 milliGRAM(s) IV Push once  influenza  Vaccine (HIGH DOSE) 0.7 milliLiter(s) IntraMuscular once  insulin lispro (ADMELOG) corrective regimen sliding scale   SubCutaneous every 6 hours  loperamide 4 milliGRAM(s) Oral every 8 hours  metoprolol succinate ER 25 milliGRAM(s) Oral daily  mirtazapine 30 milliGRAM(s) Oral at bedtime  multivitamin 1 Tablet(s) Oral daily  opium Tincture 6 milliGRAM(s) Oral every 12 hours  pantoprazole    Tablet 40 milliGRAM(s) Oral two times a day  povidone iodine 10% Solution 1 Application(s) Topical daily  psyllium Powder 1 Packet(s) Oral every 8 hours  zinc sulfate 220 milliGRAM(s) Oral daily    MEDICATIONS  (PRN):  acetaminophen     Tablet .. 650 milliGRAM(s) Oral every 6 hours PRN Temp greater or equal to 38C (100.4F), Mild Pain (1 - 3)  dextrose Oral Gel 15 Gram(s) Oral once PRN Blood Glucose LESS THAN 70 milliGRAM(s)/deciliter  dextrose Oral Gel 15 Gram(s) Oral once PRN Blood Glucose LESS THAN 70 milliGRAM(s)/deciliter  melatonin 3 milliGRAM(s) Oral at bedtime PRN Insomnia  ondansetron Injectable 4 milliGRAM(s) IV Push every 6 hours PRN Nausea and/or Vomiting  sodium chloride 0.9% lock flush 10 milliLiter(s) IV Push every 1 hour PRN Pre/post blood products, medications, blood draw, and to maintain line patency      Vital Signs Last 24 Hrs  T(C): 36.5 (16 Feb 2023 05:20), Max: 37.2 (15 Feb 2023 21:47)  T(F): 97.7 (16 Feb 2023 05:20), Max: 99 (15 Feb 2023 21:47)  HR: 97 (16 Feb 2023 07:00) (78 - 99)  BP: 90/51 (16 Feb 2023 07:00) (81/55 - 147/66)  BP(mean): 65 (16 Feb 2023 07:00) (62 - 94)  RR: 22 (16 Feb 2023 07:00) (15 - 22)  SpO2: 100% (16 Feb 2023 07:00) (96% - 100%)    Parameters below as of 16 Feb 2023 08:00  Patient On (Oxygen Delivery Method): room air        Physical Exam:  General: NAD, resting comfortably in bed  Pulmonary: Nonlabored breathing, no respiratory distress  Cardiovascular: NSR  Abdominal: soft, NT/ND  Extremities: WWP, normal strength, L AKA w/ minimal saturation of dressing  Neuro: A/O x 3, CNs II-XII grossly intact, no focal deficits    I&O's Summary    15 Feb 2023 07:01  -  16 Feb 2023 07:00  --------------------------------------------------------  IN: 5651.5 mL / OUT: 4155 mL / NET: 1496.5 mL        LABS:                        8.2    10.04 )-----------( 456      ( 16 Feb 2023 04:21 )             25.3     02-16    139  |  107  |  7   ----------------------------<  122<H>  3.6   |  22  |  0.50    Ca    8.4      16 Feb 2023 04:21  Phos  2.7     02-16  Mg     1.7     02-16      PT/INR - ( 14 Feb 2023 16:47 )   PT: 16.4 sec;   INR: 1.37          PTT - ( 15 Feb 2023 05:40 )  PTT:72.2 sec    CAPILLARY BLOOD GLUCOSE      POCT Blood Glucose.: 107 mg/dL (16 Feb 2023 00:12)  POCT Blood Glucose.: 92 mg/dL (15 Feb 2023 18:59)  POCT Blood Glucose.: 82 mg/dL (15 Feb 2023 13:28)        RADIOLOGY & ADDITIONAL STUDIES:

## 2023-02-16 NOTE — PROGRESS NOTE ADULT - ASSESSMENT
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 Surveillance bcx ngtd. Continue daptomycin and ertapenem.

## 2023-02-16 NOTE — PROGRESS NOTE ADULT - SUBJECTIVE AND OBJECTIVE BOX
INTERVAL HPI/OVERNIGHT EVENTS: s/p L AKA.    CONSTITUTIONAL:  Negative fever or chills, feels well, good appetite  EYES:  Negative  blurry vision or double vision  CARDIOVASCULAR:  Negative for chest pain or palpitations  RESPIRATORY:  Negative for cough, wheezing, or SOB   GASTROINTESTINAL:  Negative for nausea, vomiting, diarrhea, constipation, or abdominal pain  GENITOURINARY:  Negative frequency, urgency or dysuria  NEUROLOGIC:  No headache, confusion, dizziness, lightheadedness      ANTIBIOTICS/RELEVANT:    MEDICATIONS  (STANDING):  aMIOdarone    Tablet 100 milliGRAM(s) Oral every 24 hours  ascorbic acid 500 milliGRAM(s) Oral daily  chlorhexidine 2% Cloths 1 Application(s) Topical <User Schedule>  cholestyramine Powder (Sugar-Free) 2 Gram(s) Oral three times a day  DAPTOmycin IVPB      DAPTOmycin IVPB 500 milliGRAM(s) IV Intermittent every 24 hours  dextrose 5% + lactated ringers. 1000 milliLiter(s) (100 mL/Hr) IV Continuous <Continuous>  dextrose 5%. 1000 milliLiter(s) (100 mL/Hr) IV Continuous <Continuous>  dextrose 5%. 1000 milliLiter(s) (50 mL/Hr) IV Continuous <Continuous>  dextrose 5%. 1000 milliLiter(s) (50 mL/Hr) IV Continuous <Continuous>  dextrose 5%. 1000 milliLiter(s) (100 mL/Hr) IV Continuous <Continuous>  dextrose 50% Injectable 25 Gram(s) IV Push once  dextrose 50% Injectable 12.5 Gram(s) IV Push once  dextrose 50% Injectable 25 Gram(s) IV Push once  diphenoxylate/atropine 2 Tablet(s) Oral every 8 hours  enoxaparin Injectable 65 milliGRAM(s) SubCutaneous every 12 hours  ertapenem  IVPB 1000 milliGRAM(s) IV Intermittent every 24 hours  glucagon  Injectable 1 milliGRAM(s) IntraMuscular once  glucagon  Injectable 1 milliGRAM(s) IntraMuscular once  influenza  Vaccine (HIGH DOSE) 0.7 milliLiter(s) IntraMuscular once  insulin lispro (ADMELOG) corrective regimen sliding scale   SubCutaneous every 6 hours  loperamide 4 milliGRAM(s) Oral every 8 hours  metoprolol succinate ER 25 milliGRAM(s) Oral daily  mirtazapine 30 milliGRAM(s) Oral at bedtime  multivitamin 1 Tablet(s) Oral daily  opium Tincture 6 milliGRAM(s) Oral every 12 hours  pantoprazole    Tablet 40 milliGRAM(s) Oral two times a day  povidone iodine 10% Solution 1 Application(s) Topical daily  psyllium Powder 1 Packet(s) Oral every 8 hours  zinc sulfate 220 milliGRAM(s) Oral daily    MEDICATIONS  (PRN):  acetaminophen     Tablet .. 650 milliGRAM(s) Oral every 6 hours PRN Temp greater or equal to 38C (100.4F), Mild Pain (1 - 3)  dextrose Oral Gel 15 Gram(s) Oral once PRN Blood Glucose LESS THAN 70 milliGRAM(s)/deciliter  dextrose Oral Gel 15 Gram(s) Oral once PRN Blood Glucose LESS THAN 70 milliGRAM(s)/deciliter  melatonin 3 milliGRAM(s) Oral at bedtime PRN Insomnia  ondansetron Injectable 4 milliGRAM(s) IV Push every 6 hours PRN Nausea and/or Vomiting  oxyCODONE    IR 5 milliGRAM(s) Oral every 4 hours PRN Severe Pain (7 - 10)  sodium chloride 0.9% lock flush 10 milliLiter(s) IV Push every 1 hour PRN Pre/post blood products, medications, blood draw, and to maintain line patency        Vital Signs Last 24 Hrs  T(C): 36.7 (16 Feb 2023 09:00), Max: 37.2 (15 Feb 2023 21:47)  T(F): 98.1 (16 Feb 2023 09:00), Max: 99 (15 Feb 2023 21:47)  HR: 93 (16 Feb 2023 14:00) (78 - 99)  BP: 121/61 (16 Feb 2023 14:00) (81/55 - 121/61)  BP(mean): 88 (16 Feb 2023 14:00) (62 - 88)  RR: 21 (16 Feb 2023 14:00) (15 - 24)  SpO2: 99% (16 Feb 2023 14:00) (96% - 100%)    Parameters below as of 16 Feb 2023 14:00  Patient On (Oxygen Delivery Method): room air        PHYSICAL EXAM:  Constitutional: NAD  Eyes: JOHN, EOMI  Ear/Nose/Throat: no oral lesion, no sinus tenderness on percussion	  Neck: no JVD, no lymphadenopathy, supple  Respiratory: CTA keerthi  Cardiovascular: S1S2 RRR, no murmurs  Gastrointestinal:soft, (+) BS, no HSM  Extremities:no e/e/c  Vascular: DP Pulse:	right normal; left normal      LABS:                        8.2    10.04 )-----------( 456      ( 16 Feb 2023 04:21 )             25.3     02-16    139  |  107  |  7   ----------------------------<  122<H>  3.6   |  22  |  0.50    Ca    8.4      16 Feb 2023 04:21  Phos  2.7     02-16  Mg     1.7     02-16      PT/INR - ( 14 Feb 2023 16:47 )   PT: 16.4 sec;   INR: 1.37          PTT - ( 15 Feb 2023 05:40 )  PTT:72.2 sec      MICROBIOLOGY: reviewed    RADIOLOGY & ADDITIONAL STUDIES: reviewed

## 2023-02-16 NOTE — PROGRESS NOTE ADULT - ASSESSMENT
78M with PMHx of IVDU found unresponsive at his nursing home, resolved after Narcan in the field and brought to [facility]. Found to have PE, atrial flutter, and large LV thrombus on ECHOand CTA showing mid SMA with embolus s/p Ex lap, SMA embolectomy, 80cm SBR, abthera vac left in discontinuity (12/11) and transferred to SICU intubated. S/p second look (12/13) and most recently s/p OR for 3rd look, end-to-end anastomosis of remaining bowel, loop ileostomy and abdomen closure (12/15). Prev stepped down to telemetry. LLE ischemia, AKA delayed by nutritional optimization. Transferred to SICU in the setting of sepsis for HD monitoring. 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    - Maintain surgical dressing, can reinforce as needed  - Plan for RTOR for closure of L AKA, date TBD  - Remainder of care per primary team  - Vascular will continue to follow

## 2023-02-16 NOTE — PROGRESS NOTE ADULT - ATTENDING COMMENTS
Acute mesenteric ischemia - s/p ex-lap, SMA embolectomy, SBR, anastomosis, diverting ostomy, now monitoring intraabdominal collection, will require ostomy closure and will plan to address intraabdominal collection concurrently  High ostomy output - continues despite anti-motility agents, CT obtained to evaluate patency of anastomosis, no evidence of leak, will plan for reversal of ostomy when optimized nutritionally  Malnutrition - continue diet, vargas counts, ensure TID, discuss PICC/TPN  A Flutter - s/p cardioversion, anticoagulation course for cardioversion to complete 1/28  LV thrombus - long term anticoagulation, on hep gtt  LLE ischemia - s/p LLE AKA, vascular following  VRE and Klebsiella Bacteremia - continuing antibiotics per ID    At this time, patient now s/p LLE AKA with vascular surgery. Remains with high ostomy output - will need ostomy closure as patient is requiring ongoing repletion boluses and is not compliant with anti-motility regimen. Timing of ostomy reversal to be discussed pending future planned interventions per vascular.

## 2023-02-16 NOTE — PROGRESS NOTE ADULT - SUBJECTIVE AND OBJECTIVE BOX
ON: SBP 70's. Low UO. 500cc bolus x1. STAT CBC sent 8.6/26.1. Ostomy with high output. SBP in the 70's again. 1L bolus given. Minimally responsive. 500 more out of the ostomy noted (1850 in total) 500cc bolus given (total 2L overnight). Pt refused meds overnight.  2/15: LR bolus 500cc for low UOP. Ileostomy 450, voided 350. OR for L AKA guillotine, EBL 10cc, IVF 400cc, resume diet.     SUBJECTIVE:    MEDICATIONS  (STANDING):  acetaminophen     Tablet .. 975 milliGRAM(s) Oral once  aMIOdarone    Tablet 100 milliGRAM(s) Oral every 24 hours  ascorbic acid 500 milliGRAM(s) Oral daily  chlorhexidine 2% Cloths 1 Application(s) Topical <User Schedule>  cholestyramine Powder (Sugar-Free) 2 Gram(s) Oral three times a day  DAPTOmycin IVPB      DAPTOmycin IVPB 500 milliGRAM(s) IV Intermittent every 24 hours  dextrose 5% + lactated ringers. 1000 milliLiter(s) (100 mL/Hr) IV Continuous <Continuous>  dextrose 5%. 1000 milliLiter(s) (100 mL/Hr) IV Continuous <Continuous>  dextrose 5%. 1000 milliLiter(s) (50 mL/Hr) IV Continuous <Continuous>  dextrose 5%. 1000 milliLiter(s) (50 mL/Hr) IV Continuous <Continuous>  dextrose 5%. 1000 milliLiter(s) (100 mL/Hr) IV Continuous <Continuous>  dextrose 50% Injectable 25 Gram(s) IV Push once  dextrose 50% Injectable 12.5 Gram(s) IV Push once  dextrose 50% Injectable 25 Gram(s) IV Push once  diphenoxylate/atropine 2 Tablet(s) Oral every 8 hours  ertapenem  IVPB 1000 milliGRAM(s) IV Intermittent every 24 hours  glucagon  Injectable 1 milliGRAM(s) IntraMuscular once  glucagon  Injectable 1 milliGRAM(s) IntraMuscular once  heparin  Infusion 1400 Unit(s)/Hr (14 mL/Hr) IV Continuous <Continuous>  HYDROmorphone  Injectable 0.5 milliGRAM(s) IV Push once  influenza  Vaccine (HIGH DOSE) 0.7 milliLiter(s) IntraMuscular once  insulin lispro (ADMELOG) corrective regimen sliding scale   SubCutaneous every 6 hours  loperamide 4 milliGRAM(s) Oral every 8 hours  metoprolol succinate ER 25 milliGRAM(s) Oral daily  mirtazapine 30 milliGRAM(s) Oral at bedtime  multivitamin 1 Tablet(s) Oral daily  opium Tincture 6 milliGRAM(s) Oral every 12 hours  pantoprazole    Tablet 40 milliGRAM(s) Oral two times a day  povidone iodine 10% Solution 1 Application(s) Topical daily  psyllium Powder 1 Packet(s) Oral every 8 hours  zinc sulfate 220 milliGRAM(s) Oral daily    MEDICATIONS  (PRN):  acetaminophen     Tablet .. 650 milliGRAM(s) Oral every 6 hours PRN Temp greater or equal to 38C (100.4F), Mild Pain (1 - 3)  dextrose Oral Gel 15 Gram(s) Oral once PRN Blood Glucose LESS THAN 70 milliGRAM(s)/deciliter  dextrose Oral Gel 15 Gram(s) Oral once PRN Blood Glucose LESS THAN 70 milliGRAM(s)/deciliter  melatonin 3 milliGRAM(s) Oral at bedtime PRN Insomnia  ondansetron Injectable 4 milliGRAM(s) IV Push every 6 hours PRN Nausea and/or Vomiting  sodium chloride 0.9% lock flush 10 milliLiter(s) IV Push every 1 hour PRN Pre/post blood products, medications, blood draw, and to maintain line patency      Drips:     ICU Vital Signs Last 24 Hrs  T(C): 36.5 (16 Feb 2023 05:20), Max: 37.2 (15 Feb 2023 21:47)  T(F): 97.7 (16 Feb 2023 05:20), Max: 99 (15 Feb 2023 21:47)  HR: 97 (16 Feb 2023 07:00) (78 - 99)  BP: 90/51 (16 Feb 2023 07:00) (81/55 - 147/66)  BP(mean): 65 (16 Feb 2023 07:00) (62 - 94)  ABP: --  ABP(mean): --  RR: 22 (16 Feb 2023 07:00) (15 - 22)  SpO2: 100% (16 Feb 2023 07:00) (96% - 100%)    O2 Parameters below as of 16 Feb 2023 08:00  Patient On (Oxygen Delivery Method): room air            Physical Exam:  General: NAD  HEENT: NC/AT, EOMI, PERRLA, normal hearing, no oral lesions, neck supple w/o LAD  Pulmonary: Nonlabored breathing, no respiratory distress, CTA-B  Cardiovascular: NSR, no murmurs  Abdominal: soft, NT/ND, +BS, no organomegaly  Extremities: WWP, 5/5 strength x 4, no clubbing/cyanosis/edema  Neuro: A/O x3, CNs II-XII grossly intact, normal motor/sensation, no focal deficits  Pulses: palpable distal pulses    Lines/tubes/drains:  Uriarte:	      Vent settings:      I&O's Summary    15 Feb 2023 07:01  -  16 Feb 2023 07:00  --------------------------------------------------------  IN: 5651.5 mL / OUT: 4155 mL / NET: 1496.5 mL        LABS:                        8.2    10.04 )-----------( 456      ( 16 Feb 2023 04:21 )             25.3     02-16    139  |  107  |  7   ----------------------------<  122<H>  3.6   |  22  |  0.50    Ca    8.4      16 Feb 2023 04:21  Phos  2.7     02-16  Mg     1.7     02-16      PT/INR - ( 14 Feb 2023 16:47 )   PT: 16.4 sec;   INR: 1.37          PTT - ( 15 Feb 2023 05:40 )  PTT:72.2 sec    CAPILLARY BLOOD GLUCOSE      POCT Blood Glucose.: 107 mg/dL (16 Feb 2023 00:12)  POCT Blood Glucose.: 92 mg/dL (15 Feb 2023 18:59)  POCT Blood Glucose.: 82 mg/dL (15 Feb 2023 13:28)        Cultures:    RADIOLOGY & ADDITIONAL STUDIES:     ON: SBP 70's. Low UO. 500cc bolus x1. STAT CBC sent 8.6/26.1. Ostomy with high output. SBP in the 70's again. 1L bolus given. Minimally responsive. 500 more out of the ostomy noted (1850 in total) 500cc bolus given (total 2L overnight). Pt refused meds overnight.  2/15: LR bolus 500cc for low UOP. Ileostomy 450, voided 350. OR for L AKA guillotine, EBL 10cc, IVF 400cc, resume diet.     SUBJECTIVE: S/p amputation, feeling hungry, asking for coffee, pain well controlled under current regimen.    MEDICATIONS  (STANDING):  acetaminophen     Tablet .. 975 milliGRAM(s) Oral once  aMIOdarone    Tablet 100 milliGRAM(s) Oral every 24 hours  ascorbic acid 500 milliGRAM(s) Oral daily  chlorhexidine 2% Cloths 1 Application(s) Topical <User Schedule>  cholestyramine Powder (Sugar-Free) 2 Gram(s) Oral three times a day  DAPTOmycin IVPB      DAPTOmycin IVPB 500 milliGRAM(s) IV Intermittent every 24 hours  dextrose 5% + lactated ringers. 1000 milliLiter(s) (100 mL/Hr) IV Continuous <Continuous>  dextrose 5%. 1000 milliLiter(s) (100 mL/Hr) IV Continuous <Continuous>  dextrose 5%. 1000 milliLiter(s) (50 mL/Hr) IV Continuous <Continuous>  dextrose 5%. 1000 milliLiter(s) (50 mL/Hr) IV Continuous <Continuous>  dextrose 5%. 1000 milliLiter(s) (100 mL/Hr) IV Continuous <Continuous>  dextrose 50% Injectable 25 Gram(s) IV Push once  dextrose 50% Injectable 12.5 Gram(s) IV Push once  dextrose 50% Injectable 25 Gram(s) IV Push once  diphenoxylate/atropine 2 Tablet(s) Oral every 8 hours  ertapenem  IVPB 1000 milliGRAM(s) IV Intermittent every 24 hours  glucagon  Injectable 1 milliGRAM(s) IntraMuscular once  glucagon  Injectable 1 milliGRAM(s) IntraMuscular once  heparin  Infusion 1400 Unit(s)/Hr (14 mL/Hr) IV Continuous <Continuous>  HYDROmorphone  Injectable 0.5 milliGRAM(s) IV Push once  influenza  Vaccine (HIGH DOSE) 0.7 milliLiter(s) IntraMuscular once  insulin lispro (ADMELOG) corrective regimen sliding scale   SubCutaneous every 6 hours  loperamide 4 milliGRAM(s) Oral every 8 hours  metoprolol succinate ER 25 milliGRAM(s) Oral daily  mirtazapine 30 milliGRAM(s) Oral at bedtime  multivitamin 1 Tablet(s) Oral daily  opium Tincture 6 milliGRAM(s) Oral every 12 hours  pantoprazole    Tablet 40 milliGRAM(s) Oral two times a day  povidone iodine 10% Solution 1 Application(s) Topical daily  psyllium Powder 1 Packet(s) Oral every 8 hours  zinc sulfate 220 milliGRAM(s) Oral daily    MEDICATIONS  (PRN):  acetaminophen     Tablet .. 650 milliGRAM(s) Oral every 6 hours PRN Temp greater or equal to 38C (100.4F), Mild Pain (1 - 3)  dextrose Oral Gel 15 Gram(s) Oral once PRN Blood Glucose LESS THAN 70 milliGRAM(s)/deciliter  dextrose Oral Gel 15 Gram(s) Oral once PRN Blood Glucose LESS THAN 70 milliGRAM(s)/deciliter  melatonin 3 milliGRAM(s) Oral at bedtime PRN Insomnia  ondansetron Injectable 4 milliGRAM(s) IV Push every 6 hours PRN Nausea and/or Vomiting  sodium chloride 0.9% lock flush 10 milliLiter(s) IV Push every 1 hour PRN Pre/post blood products, medications, blood draw, and to maintain line patency      Drips:     ICU Vital Signs Last 24 Hrs  T(C): 36.5 (16 Feb 2023 05:20), Max: 37.2 (15 Feb 2023 21:47)  T(F): 97.7 (16 Feb 2023 05:20), Max: 99 (15 Feb 2023 21:47)  HR: 97 (16 Feb 2023 07:00) (78 - 99)  BP: 90/51 (16 Feb 2023 07:00) (81/55 - 147/66)  BP(mean): 65 (16 Feb 2023 07:00) (62 - 94)  ABP: --  ABP(mean): --  RR: 22 (16 Feb 2023 07:00) (15 - 22)  SpO2: 100% (16 Feb 2023 07:00) (96% - 100%)    O2 Parameters below as of 16 Feb 2023 08:00  Patient On (Oxygen Delivery Method): room air            Physical Exam:    General: NAD  HEENT: NC/AT, EOMI, PERRLA  Pulmonary: B/l breath sounds, no r/rw  Cardiovascular: S1 s2, NSR, no m/r.g  Abdominal: Soft, NTND, ostomy with yellow liquid stool in the bag  Extremities: Left leg stump c/d/i, no obvious signs of bleeding/hematoma/infection  Neuro: AAO x3      Lines/tubes/drains:  Uriarte:	      Vent settings:      I&O's Summary    15 Feb 2023 07:01  -  16 Feb 2023 07:00  --------------------------------------------------------  IN: 5651.5 mL / OUT: 4155 mL / NET: 1496.5 mL        LABS:                        8.2    10.04 )-----------( 456      ( 16 Feb 2023 04:21 )             25.3     02-16    139  |  107  |  7   ----------------------------<  122<H>  3.6   |  22  |  0.50    Ca    8.4      16 Feb 2023 04:21  Phos  2.7     02-16  Mg     1.7     02-16      PT/INR - ( 14 Feb 2023 16:47 )   PT: 16.4 sec;   INR: 1.37          PTT - ( 15 Feb 2023 05:40 )  PTT:72.2 sec    CAPILLARY BLOOD GLUCOSE      POCT Blood Glucose.: 107 mg/dL (16 Feb 2023 00:12)  POCT Blood Glucose.: 92 mg/dL (15 Feb 2023 18:59)  POCT Blood Glucose.: 82 mg/dL (15 Feb 2023 13:28)        Cultures:    RADIOLOGY & ADDITIONAL STUDIES:

## 2023-02-16 NOTE — PROGRESS NOTE ADULT - SUBJECTIVE AND OBJECTIVE BOX
24 hr events: : SBP 70's. Low UO. 500cc bolus x1. STAT CBC sent 8.6/26.1. Ostomy with high output. SBP in the 70's again. 1L bolus given. Minimally responsive. 500 more out of the ostomy noted (1850 in total) 500cc bolus given (total 2L overnight). Pt refused meds overnight.      SUBJECTIVE: Patient seen at bedside complaining of pain from L AKA site, Vascular at bedside to change saturated dressing. No CP, SOB, fevers or chills.     Vital Signs Last 24 Hrs  T(C): 36.7 (16 Feb 2023 09:00), Max: 37.2 (15 Feb 2023 21:47)  T(F): 98.1 (16 Feb 2023 09:00), Max: 99 (15 Feb 2023 21:47)  HR: 90 (16 Feb 2023 10:00) (78 - 99)  BP: 98/59 (16 Feb 2023 10:00) (81/55 - 147/66)  BP(mean): 70 (16 Feb 2023 10:00) (62 - 94)  RR: 23 (16 Feb 2023 10:00) (15 - 23)  SpO2: 100% (16 Feb 2023 10:00) (96% - 100%)    Parameters below as of 16 Feb 2023 10:00  Patient On (Oxygen Delivery Method): room air        PHYSICAL EXAM:   General: NAD, noted to be in mild discomfort due to leg pain  HEENT: NC/AT, EOMI, PERRLA  Pulmonary: B/l breath sounds, nonlabored breathing, no r/r/w  Cardiovascular: S1 s2, NSR, no m/r/g  Abdominal: Soft, NTND, ostomy with yellow liquid stool in bag  Extremities: Left AKA stump dressed with kerlix/ACE bandage, saturated with serosanguinous fluid    Lines/drains/tubes:    I&O's Summary    15 Feb 2023 07:01  -  16 Feb 2023 07:00  --------------------------------------------------------  IN: 5651.5 mL / OUT: 4155 mL / NET: 1496.5 mL    16 Feb 2023 07:01  -  16 Feb 2023 10:44  --------------------------------------------------------  IN: 128 mL / OUT: 700 mL / NET: -572 mL        LABS:                        8.2    10.04 )-----------( 456      ( 16 Feb 2023 04:21 )             25.3     02-16    139  |  107  |  7   ----------------------------<  122<H>  3.6   |  22  |  0.50    Ca    8.4      16 Feb 2023 04:21  Phos  2.7     02-16  Mg     1.7     02-16      PT/INR - ( 14 Feb 2023 16:47 )   PT: 16.4 sec;   INR: 1.37          PTT - ( 15 Feb 2023 05:40 )  PTT:72.2 sec    CAPILLARY BLOOD GLUCOSE      POCT Blood Glucose.: 107 mg/dL (16 Feb 2023 00:12)  POCT Blood Glucose.: 92 mg/dL (15 Feb 2023 18:59)  POCT Blood Glucose.: 82 mg/dL (15 Feb 2023 13:28)        RADIOLOGY & ADDITIONAL STUDIES:

## 2023-02-16 NOTE — PROGRESS NOTE ADULT - ASSESSMENT
77M with PMHx of IVDU found unresponsive at his nursing home, resolved after Narcan in the field and brought to [facility]. Found to have PE, atrial flutter, and large LV thrombus on echo and CTA showing mid SMA with embolus s/p Ex lap, SMA embolectomy, 80cm SBR, abthera vac left in discontinuity (12/11) and transferred to SICU intubated. S/p second look (12/13) and most recently s/p OR for 3rd look, end-to-end anastomosis of remaining bowel, loop ileostomy and abdomen closure (12/15). Now stepped down to telemtry. LLE ischemia, AKA delayed by nutritional optimization. Transferred to SICU in the setting of sepsis for HD monitoring. 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

## 2023-02-16 NOTE — CHART NOTE - NSCHARTNOTEFT_GEN_A_CORE
C/L psychology intern met with pt and his sister, Sophie, for 30-minute psychotherapy session. Pt was AAOx4, reported mood "not so good, I got my leg cut off." Pt was eating during session, seems to have good appetite. Pt's sister brought him hot pizza. He acknowledged the "hard work" he will need to do to move toward recovery and to increase his mobility. Warm and engaging interaction observed between pt and his sister. No SI/SIB/HI/AH/VH/PI observed or reported.

## 2023-02-16 NOTE — PROGRESS NOTE ADULT - ASSESSMENT
77M with PMHx of IVDU found unresponsive at his nursing home, resolved after Narcan in the field and brought to [facility]. Found to have PE, atrial flutter, and large LV thrombus on echo. Transferred to [facility] for further management. Pt c/o abdominal pain on 12/10 CTA showing mid SMA with embolus. Abnormal distal small bowel loops and cecum with dilatation and pneumatosis suggesting infarcted bowel. One or two tiny foci of intrahepatic portal vein pneumatosis. Segmental infarction upper pole left kidney. Now s/p Ex lap, SMA embolectomy, 80cm SBR, abthera vac left in discontinuity (12/11) and transferred to SICU intubated. S/p second look (12/13) and most recently s/p OR for 3rd look, end-to-end anastomosis of remaining bowel, loop ileostomy and abdomen closure (12/15). S/p LORENZO and Cardioversion on 12/30 with cardiology. Now stepped down to telemetry. LLE ischemia, AKA delayed by nutritional optimization. Transferred to SICU in the setting of sepsis for HD monitoring.     Plan:   Neuro: Tylenol and Oxycodone PRN.   CV: Hypotension 2/2 sepsis/hypovolemia. Spirolactone held. IV abx started. Hypovolemic: Bolus as needed. D5LR started at 150/hr. Repeat echo with EF 55-60%. Mildly dilated RV. Maintain MAP >65. A.Fib/Flutter: s/p cardioversion on 12/30. Continue metoprolol/amiodarone   Pulm: Sating well on nasal canula   GI/FEN: High Ostomy output. Replete. Continue Immodium/Tincture of Opium. PPN discontinued in the setting of bacteremia. D5LR started. NPO @mn for OR (02/15)  : Condom cath in place. Strict I and O.   Endo: mISS  Heme: Anemia H/H 7.4/22.5 s/p 2uPRBC (02/11). LV thrombis w/ LLE ischemia. SQL on hold for possible procedure. Heparin drip >> SQL 65BID. Active T&S;   ID: Sepsis 2/2 K. Pneumoniae/Enterococcus VRE Bacteremia: Undrainable abscess in Abd, _ LLExt ischemia with associated infection: AKA on 02/15.Cont: Luis (2/10--) (Dapto 2/11--)   dc//:: Zosyn. Vanc (2/10--2/11).  PPX: SCDs, SQL 65BID.   L/D/T: PIV  PT/OT: Not ordered  Dispo: SICU, [facility]

## 2023-02-17 NOTE — PROGRESS NOTE ADULT - SUBJECTIVE AND OBJECTIVE BOX
ON: 7pm 1L given for high ostomy,   2/16: repleted. 1L LR given early Am. Another 1L LR given 3pm. Vascular and Yannick ok with SQL.    SUBJECTIVE:    MEDICATIONS  (STANDING):  aMIOdarone    Tablet 100 milliGRAM(s) Oral every 24 hours  ascorbic acid 500 milliGRAM(s) Oral daily  chlorhexidine 2% Cloths 1 Application(s) Topical <User Schedule>  cholestyramine Powder (Sugar-Free) 2 Gram(s) Oral three times a day  DAPTOmycin IVPB 500 milliGRAM(s) IV Intermittent every 24 hours  DAPTOmycin IVPB      dextrose 5% + lactated ringers. 1000 milliLiter(s) (100 mL/Hr) IV Continuous <Continuous>  dextrose 5%. 1000 milliLiter(s) (100 mL/Hr) IV Continuous <Continuous>  dextrose 5%. 1000 milliLiter(s) (50 mL/Hr) IV Continuous <Continuous>  dextrose 5%. 1000 milliLiter(s) (50 mL/Hr) IV Continuous <Continuous>  dextrose 5%. 1000 milliLiter(s) (100 mL/Hr) IV Continuous <Continuous>  dextrose 50% Injectable 25 Gram(s) IV Push once  dextrose 50% Injectable 12.5 Gram(s) IV Push once  dextrose 50% Injectable 25 Gram(s) IV Push once  diphenoxylate/atropine 2 Tablet(s) Oral every 8 hours  enoxaparin Injectable 65 milliGRAM(s) SubCutaneous every 12 hours  ertapenem  IVPB 1000 milliGRAM(s) IV Intermittent every 24 hours  glucagon  Injectable 1 milliGRAM(s) IntraMuscular once  glucagon  Injectable 1 milliGRAM(s) IntraMuscular once  influenza  Vaccine (HIGH DOSE) 0.7 milliLiter(s) IntraMuscular once  insulin lispro (ADMELOG) corrective regimen sliding scale   SubCutaneous every 6 hours  loperamide 4 milliGRAM(s) Oral every 8 hours  metoprolol succinate ER 25 milliGRAM(s) Oral daily  mirtazapine 30 milliGRAM(s) Oral at bedtime  multivitamin 1 Tablet(s) Oral daily  opium Tincture 6 milliGRAM(s) Oral every 12 hours  pantoprazole    Tablet 40 milliGRAM(s) Oral two times a day  povidone iodine 10% Solution 1 Application(s) Topical daily  psyllium Powder 1 Packet(s) Oral every 8 hours  zinc sulfate 220 milliGRAM(s) Oral daily    MEDICATIONS  (PRN):  acetaminophen     Tablet .. 650 milliGRAM(s) Oral every 6 hours PRN Temp greater or equal to 38C (100.4F), Mild Pain (1 - 3)  dextrose Oral Gel 15 Gram(s) Oral once PRN Blood Glucose LESS THAN 70 milliGRAM(s)/deciliter  dextrose Oral Gel 15 Gram(s) Oral once PRN Blood Glucose LESS THAN 70 milliGRAM(s)/deciliter  melatonin 3 milliGRAM(s) Oral at bedtime PRN Insomnia  ondansetron Injectable 4 milliGRAM(s) IV Push every 6 hours PRN Nausea and/or Vomiting  oxyCODONE    IR 5 milliGRAM(s) Oral every 4 hours PRN Severe Pain (7 - 10)  sodium chloride 0.9% lock flush 10 milliLiter(s) IV Push every 1 hour PRN Pre/post blood products, medications, blood draw, and to maintain line patency      Drips:     ICU Vital Signs Last 24 Hrs  T(C): 37.2 (17 Feb 2023 06:15), Max: 37.2 (17 Feb 2023 02:05)  T(F): 99 (17 Feb 2023 06:15), Max: 99 (17 Feb 2023 02:05)  HR: 97 (17 Feb 2023 06:00) (83 - 99)  BP: 122/74 (17 Feb 2023 06:00) (85/57 - 131/77)  BP(mean): 91 (17 Feb 2023 06:00) (65 - 99)  ABP: --  ABP(mean): --  RR: 20 (17 Feb 2023 06:00) (16 - 24)  SpO2: 100% (17 Feb 2023 06:00) (93% - 100%)    O2 Parameters below as of 17 Feb 2023 07:00  Patient On (Oxygen Delivery Method): room air            Physical Exam:  General: NAD  HEENT: NC/AT, EOMI, PERRLA, normal hearing, no oral lesions, neck supple w/o LAD  Pulmonary: Nonlabored breathing, no respiratory distress, CTA-B  Cardiovascular: NSR, no murmurs  Abdominal: soft, NT/ND, +BS, no organomegaly  Extremities: WWP, 5/5 strength x 4, no clubbing/cyanosis/edema  Neuro: A/O x3, CNs II-XII grossly intact, normal motor/sensation, no focal deficits  Pulses: palpable distal pulses    Lines/tubes/drains:  Kalani:	      Shelby settings:      I&O's Summary    15 Feb 2023 07:01  -  16 Feb 2023 07:00  --------------------------------------------------------  IN: 5651.5 mL / OUT: 4155 mL / NET: 1496.5 mL    16 Feb 2023 07:01  -  17 Feb 2023 06:46  --------------------------------------------------------  IN: 5982 mL / OUT: 5850 mL / NET: 132 mL        LABS:                        7.4    9.03  )-----------( 421      ( 17 Feb 2023 06:07 )             23.4     02-17    138  |  110<H>  |  5<L>  ----------------------------<  107<H>  3.9   |  20<L>  |  0.54    Ca    8.2<L>      17 Feb 2023 06:07  Phos  2.7     02-17  Mg     1.7     02-17          CAPILLARY BLOOD GLUCOSE      POCT Blood Glucose.: 103 mg/dL (16 Feb 2023 23:49)  POCT Blood Glucose.: 98 mg/dL (16 Feb 2023 17:37)  POCT Blood Glucose.: 144 mg/dL (16 Feb 2023 11:33)        Cultures:    RADIOLOGY & ADDITIONAL STUDIES:     ON: 7pm 1L given for high ostomy,   2/16: repleted. 1L LR given early Am. Another 1L LR given 3pm. Vascular and Yannick ok with SQL.    SUBJECTIVE: Sleepy but arousable, feeling well this am, no complaints this morning, eating breakfast.    MEDICATIONS  (STANDING):  aMIOdarone    Tablet 100 milliGRAM(s) Oral every 24 hours  ascorbic acid 500 milliGRAM(s) Oral daily  chlorhexidine 2% Cloths 1 Application(s) Topical <User Schedule>  cholestyramine Powder (Sugar-Free) 2 Gram(s) Oral three times a day  DAPTOmycin IVPB 500 milliGRAM(s) IV Intermittent every 24 hours  DAPTOmycin IVPB      dextrose 5% + lactated ringers. 1000 milliLiter(s) (100 mL/Hr) IV Continuous <Continuous>  dextrose 5%. 1000 milliLiter(s) (100 mL/Hr) IV Continuous <Continuous>  dextrose 5%. 1000 milliLiter(s) (50 mL/Hr) IV Continuous <Continuous>  dextrose 5%. 1000 milliLiter(s) (50 mL/Hr) IV Continuous <Continuous>  dextrose 5%. 1000 milliLiter(s) (100 mL/Hr) IV Continuous <Continuous>  dextrose 50% Injectable 25 Gram(s) IV Push once  dextrose 50% Injectable 12.5 Gram(s) IV Push once  dextrose 50% Injectable 25 Gram(s) IV Push once  diphenoxylate/atropine 2 Tablet(s) Oral every 8 hours  enoxaparin Injectable 65 milliGRAM(s) SubCutaneous every 12 hours  ertapenem  IVPB 1000 milliGRAM(s) IV Intermittent every 24 hours  glucagon  Injectable 1 milliGRAM(s) IntraMuscular once  glucagon  Injectable 1 milliGRAM(s) IntraMuscular once  influenza  Vaccine (HIGH DOSE) 0.7 milliLiter(s) IntraMuscular once  insulin lispro (ADMELOG) corrective regimen sliding scale   SubCutaneous every 6 hours  loperamide 4 milliGRAM(s) Oral every 8 hours  metoprolol succinate ER 25 milliGRAM(s) Oral daily  mirtazapine 30 milliGRAM(s) Oral at bedtime  multivitamin 1 Tablet(s) Oral daily  opium Tincture 6 milliGRAM(s) Oral every 12 hours  pantoprazole    Tablet 40 milliGRAM(s) Oral two times a day  povidone iodine 10% Solution 1 Application(s) Topical daily  psyllium Powder 1 Packet(s) Oral every 8 hours  zinc sulfate 220 milliGRAM(s) Oral daily    MEDICATIONS  (PRN):  acetaminophen     Tablet .. 650 milliGRAM(s) Oral every 6 hours PRN Temp greater or equal to 38C (100.4F), Mild Pain (1 - 3)  dextrose Oral Gel 15 Gram(s) Oral once PRN Blood Glucose LESS THAN 70 milliGRAM(s)/deciliter  dextrose Oral Gel 15 Gram(s) Oral once PRN Blood Glucose LESS THAN 70 milliGRAM(s)/deciliter  melatonin 3 milliGRAM(s) Oral at bedtime PRN Insomnia  ondansetron Injectable 4 milliGRAM(s) IV Push every 6 hours PRN Nausea and/or Vomiting  oxyCODONE    IR 5 milliGRAM(s) Oral every 4 hours PRN Severe Pain (7 - 10)  sodium chloride 0.9% lock flush 10 milliLiter(s) IV Push every 1 hour PRN Pre/post blood products, medications, blood draw, and to maintain line patency      Drips:     ICU Vital Signs Last 24 Hrs  T(C): 37.2 (17 Feb 2023 06:15), Max: 37.2 (17 Feb 2023 02:05)  T(F): 99 (17 Feb 2023 06:15), Max: 99 (17 Feb 2023 02:05)  HR: 97 (17 Feb 2023 06:00) (83 - 99)  BP: 122/74 (17 Feb 2023 06:00) (85/57 - 131/77)  BP(mean): 91 (17 Feb 2023 06:00) (65 - 99)  ABP: --  ABP(mean): --  RR: 20 (17 Feb 2023 06:00) (16 - 24)  SpO2: 100% (17 Feb 2023 06:00) (93% - 100%)    O2 Parameters below as of 17 Feb 2023 07:00  Patient On (Oxygen Delivery Method): room air            Physical Exam:    General: NAD, sleepy  HEENT: NC/AT, EOMI, PERRLA  Pulmonary: B/l breath sounds, nonlabored breathing, no r/r/w  Cardiovascular: S1 s2, no m/r/g  Abdominal: Soft, NTND  Extremities: Left stump c/d/i, no obvious signs of bleeding/hematoma/infection  Neuro: AAO x3, sleepy      Lines/tubes/drains:  Kalani:	      Shelby settings:      I&O's Summary    15 Feb 2023 07:01  -  16 Feb 2023 07:00  --------------------------------------------------------  IN: 5651.5 mL / OUT: 4155 mL / NET: 1496.5 mL    16 Feb 2023 07:01  -  17 Feb 2023 06:46  --------------------------------------------------------  IN: 5982 mL / OUT: 5850 mL / NET: 132 mL        LABS:                        7.4    9.03  )-----------( 421      ( 17 Feb 2023 06:07 )             23.4     02-17    138  |  110<H>  |  5<L>  ----------------------------<  107<H>  3.9   |  20<L>  |  0.54    Ca    8.2<L>      17 Feb 2023 06:07  Phos  2.7     02-17  Mg     1.7     02-17          CAPILLARY BLOOD GLUCOSE      POCT Blood Glucose.: 103 mg/dL (16 Feb 2023 23:49)  POCT Blood Glucose.: 98 mg/dL (16 Feb 2023 17:37)  POCT Blood Glucose.: 144 mg/dL (16 Feb 2023 11:33)        Cultures:    RADIOLOGY & ADDITIONAL STUDIES:

## 2023-02-17 NOTE — PROGRESS NOTE ADULT - SUBJECTIVE AND OBJECTIVE BOX
SUBJECTIVE:  Doing well this AM. NAEON. Denies n/v. Endorses f/bm. Denies cp/sob. Pain is well controlled. Tolerating diet.    MEDICATIONS  (STANDING):  aMIOdarone    Tablet 100 milliGRAM(s) Oral every 24 hours  ascorbic acid 500 milliGRAM(s) Oral daily  chlorhexidine 2% Cloths 1 Application(s) Topical <User Schedule>  cholestyramine Powder (Sugar-Free) 2 Gram(s) Oral three times a day  DAPTOmycin IVPB      DAPTOmycin IVPB 500 milliGRAM(s) IV Intermittent every 24 hours  dextrose 5% + lactated ringers. 1000 milliLiter(s) (100 mL/Hr) IV Continuous <Continuous>  dextrose 5%. 1000 milliLiter(s) (100 mL/Hr) IV Continuous <Continuous>  dextrose 5%. 1000 milliLiter(s) (50 mL/Hr) IV Continuous <Continuous>  dextrose 5%. 1000 milliLiter(s) (100 mL/Hr) IV Continuous <Continuous>  dextrose 5%. 1000 milliLiter(s) (50 mL/Hr) IV Continuous <Continuous>  dextrose 50% Injectable 25 Gram(s) IV Push once  dextrose 50% Injectable 12.5 Gram(s) IV Push once  dextrose 50% Injectable 25 Gram(s) IV Push once  diphenoxylate/atropine 2 Tablet(s) Oral every 8 hours  enoxaparin Injectable 65 milliGRAM(s) SubCutaneous every 12 hours  ertapenem  IVPB 1000 milliGRAM(s) IV Intermittent every 24 hours  glucagon  Injectable 1 milliGRAM(s) IntraMuscular once  glucagon  Injectable 1 milliGRAM(s) IntraMuscular once  influenza  Vaccine (HIGH DOSE) 0.7 milliLiter(s) IntraMuscular once  insulin lispro (ADMELOG) corrective regimen sliding scale   SubCutaneous every 6 hours  loperamide 4 milliGRAM(s) Oral every 8 hours  metoprolol succinate ER 25 milliGRAM(s) Oral daily  mirtazapine 30 milliGRAM(s) Oral at bedtime  multivitamin 1 Tablet(s) Oral daily  opium Tincture 6 milliGRAM(s) Oral every 12 hours  pantoprazole    Tablet 40 milliGRAM(s) Oral two times a day  povidone iodine 10% Solution 1 Application(s) Topical daily  psyllium Powder 1 Packet(s) Oral every 8 hours  zinc sulfate 220 milliGRAM(s) Oral daily    MEDICATIONS  (PRN):  acetaminophen     Tablet .. 650 milliGRAM(s) Oral every 6 hours PRN Temp greater or equal to 38C (100.4F), Mild Pain (1 - 3)  dextrose Oral Gel 15 Gram(s) Oral once PRN Blood Glucose LESS THAN 70 milliGRAM(s)/deciliter  dextrose Oral Gel 15 Gram(s) Oral once PRN Blood Glucose LESS THAN 70 milliGRAM(s)/deciliter  melatonin 3 milliGRAM(s) Oral at bedtime PRN Insomnia  ondansetron Injectable 4 milliGRAM(s) IV Push every 6 hours PRN Nausea and/or Vomiting  oxyCODONE    IR 5 milliGRAM(s) Oral every 4 hours PRN Severe Pain (7 - 10)  sodium chloride 0.9% lock flush 10 milliLiter(s) IV Push every 1 hour PRN Pre/post blood products, medications, blood draw, and to maintain line patency      Vital Signs Last 24 Hrs  T(C): 37.3 (17 Feb 2023 09:16), Max: 37.4 (17 Feb 2023 09:00)  T(F): 99.1 (17 Feb 2023 09:16), Max: 99.3 (17 Feb 2023 09:00)  HR: 104 (17 Feb 2023 12:00) (83 - 104)  BP: 103/75 (17 Feb 2023 12:00) (85/57 - 131/77)  BP(mean): 85 (17 Feb 2023 12:00) (66 - 99)  RR: 26 (17 Feb 2023 12:00) (16 - 28)  SpO2: 98% (17 Feb 2023 12:00) (93% - 100%)    Parameters below as of 17 Feb 2023 12:00  Patient On (Oxygen Delivery Method): room air        Physical Exam:  General: NAD, resting comfortably in bed  Pulmonary: Nonlabored breathing, no respiratory distress  Cardiovascular: NSR  Abdominal: soft, NT/ND  Extremities: WWP, normal strength, L AKA w/ mild blood on gauze  Neuro: A/O x 3, CNs II-XII grossly intact, no focal deficits    I&O's Summary    16 Feb 2023 07:01  -  17 Feb 2023 07:00  --------------------------------------------------------  IN: 6007 mL / OUT: 5850 mL / NET: 157 mL    17 Feb 2023 07:01  -  17 Feb 2023 12:20  --------------------------------------------------------  IN: 1865 mL / OUT: 750 mL / NET: 1115 mL        LABS:                        7.4    9.03  )-----------( 421      ( 17 Feb 2023 06:07 )             23.4     02-17    138  |  110<H>  |  5<L>  ----------------------------<  107<H>  3.9   |  20<L>  |  0.54    Ca    8.2<L>      17 Feb 2023 06:07  Phos  2.7     02-17  Mg     1.7     02-17      CAPILLARY BLOOD GLUCOSE      POCT Blood Glucose.: 99 mg/dL (17 Feb 2023 11:19)  POCT Blood Glucose.: 103 mg/dL (16 Feb 2023 23:49)  POCT Blood Glucose.: 98 mg/dL (16 Feb 2023 17:37)

## 2023-02-17 NOTE — PROGRESS NOTE ADULT - ASSESSMENT
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 Surveillance bcx ngtd. Continue daptomycin and ertapenem both thru 2/24/23 to complete two week course of antibiotics. Advise repeat CK 2/18.    Please reconsult with ?

## 2023-02-17 NOTE — CHART NOTE - NSCHARTNOTEFT_GEN_A_CORE
Infectious Diseases Anti-infective Approval Note    Medication: daptomycin  Dose*: 500mg  Route: IV  Frequency: q24h  Duration**: until 2/24/23    Medication: ertapenem  Dose*: 1g  Route: IV  Frequency: q24h  Duration**: until 2/24/23    *Dose may be adjusted as needed for alterations in renal function.    **Reflects duration of approval and not necessarily duration of therapy     THIS IS NOT AN INFECTIOUS DISEASES CONSULTATION

## 2023-02-17 NOTE — PROGRESS NOTE ADULT - ASSESSMENT
78M with PMHx of IVDU found unresponsive at his nursing home, resolved after Narcan in the field and brought to [facility]. Found to have PE, atrial flutter, and large LV thrombus on ECHOand CTA showing mid SMA with embolus s/p Ex lap, SMA embolectomy, 80cm SBR, abthera vac left in discontinuity (12/11) and transferred to SICU intubated. S/p second look (12/13) and most recently s/p OR for 3rd look, end-to-end anastomosis of remaining bowel, loop ileostomy and abdomen closure (12/15). Prev stepped down to telemetry. LLE ischemia, AKA delayed by nutritional optimization. Transferred to SICU in the setting of sepsis for HD monitoring. 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    - Maintain surgical dressing, can reinforce as needed  - Plan for RTOR for closure of L AKA, date TBD  - Remainder of care per primary team  - Vascular will continue to follow

## 2023-02-17 NOTE — PROGRESS NOTE ADULT - SUBJECTIVE AND OBJECTIVE BOX
INTERVAL HPI/OVERNIGHT EVENTS: MAYO.    CONSTITUTIONAL:  Negative fever or chills, feels well, good appetite  EYES:  Negative  blurry vision or double vision  CARDIOVASCULAR:  Negative for chest pain or palpitations  RESPIRATORY:  Negative for cough, wheezing, or SOB   GASTROINTESTINAL:  Negative for nausea, vomiting, diarrhea, constipation, or abdominal pain  GENITOURINARY:  Negative frequency, urgency or dysuria  NEUROLOGIC:  No headache, confusion, dizziness, lightheadedness      ANTIBIOTICS/RELEVANT:    MEDICATIONS  (STANDING):  aMIOdarone    Tablet 100 milliGRAM(s) Oral every 24 hours  ascorbic acid 500 milliGRAM(s) Oral daily  chlorhexidine 2% Cloths 1 Application(s) Topical <User Schedule>  cholestyramine Powder (Sugar-Free) 2 Gram(s) Oral three times a day  DAPTOmycin IVPB      DAPTOmycin IVPB 500 milliGRAM(s) IV Intermittent every 24 hours  dextrose 5% + lactated ringers. 1000 milliLiter(s) (100 mL/Hr) IV Continuous <Continuous>  dextrose 5%. 1000 milliLiter(s) (100 mL/Hr) IV Continuous <Continuous>  dextrose 5%. 1000 milliLiter(s) (50 mL/Hr) IV Continuous <Continuous>  dextrose 5%. 1000 milliLiter(s) (50 mL/Hr) IV Continuous <Continuous>  dextrose 5%. 1000 milliLiter(s) (100 mL/Hr) IV Continuous <Continuous>  dextrose 50% Injectable 25 Gram(s) IV Push once  dextrose 50% Injectable 12.5 Gram(s) IV Push once  dextrose 50% Injectable 25 Gram(s) IV Push once  diphenoxylate/atropine 2 Tablet(s) Oral every 8 hours  enoxaparin Injectable 65 milliGRAM(s) SubCutaneous every 12 hours  ertapenem  IVPB 1000 milliGRAM(s) IV Intermittent every 24 hours  glucagon  Injectable 1 milliGRAM(s) IntraMuscular once  glucagon  Injectable 1 milliGRAM(s) IntraMuscular once  HYDROmorphone  Injectable 0.5 milliGRAM(s) IV Push once  influenza  Vaccine (HIGH DOSE) 0.7 milliLiter(s) IntraMuscular once  insulin lispro (ADMELOG) corrective regimen sliding scale   SubCutaneous every 6 hours  loperamide 4 milliGRAM(s) Oral every 8 hours  metoprolol succinate ER 25 milliGRAM(s) Oral daily  mirtazapine 30 milliGRAM(s) Oral at bedtime  multivitamin 1 Tablet(s) Oral daily  opium Tincture 6 milliGRAM(s) Oral every 12 hours  pantoprazole    Tablet 40 milliGRAM(s) Oral two times a day  povidone iodine 10% Solution 1 Application(s) Topical daily  psyllium Powder 1 Packet(s) Oral every 8 hours  zinc sulfate 220 milliGRAM(s) Oral daily    MEDICATIONS  (PRN):  acetaminophen     Tablet .. 650 milliGRAM(s) Oral every 6 hours PRN Temp greater or equal to 38C (100.4F), Mild Pain (1 - 3)  dextrose Oral Gel 15 Gram(s) Oral once PRN Blood Glucose LESS THAN 70 milliGRAM(s)/deciliter  dextrose Oral Gel 15 Gram(s) Oral once PRN Blood Glucose LESS THAN 70 milliGRAM(s)/deciliter  melatonin 3 milliGRAM(s) Oral at bedtime PRN Insomnia  ondansetron Injectable 4 milliGRAM(s) IV Push every 6 hours PRN Nausea and/or Vomiting  oxyCODONE    IR 5 milliGRAM(s) Oral every 4 hours PRN Severe Pain (7 - 10)  sodium chloride 0.9% lock flush 10 milliLiter(s) IV Push every 1 hour PRN Pre/post blood products, medications, blood draw, and to maintain line patency        Vital Signs Last 24 Hrs  T(C): 37.3 (17 Feb 2023 09:16), Max: 37.4 (17 Feb 2023 09:00)  T(F): 99.1 (17 Feb 2023 09:16), Max: 99.3 (17 Feb 2023 09:00)  HR: 91 (17 Feb 2023 11:00) (83 - 101)  BP: 97/59 (17 Feb 2023 11:00) (85/57 - 131/77)  BP(mean): 72 (17 Feb 2023 11:00) (66 - 99)  RR: 20 (17 Feb 2023 11:00) (16 - 28)  SpO2: 97% (17 Feb 2023 11:00) (93% - 100%)    Parameters below as of 17 Feb 2023 11:00  Patient On (Oxygen Delivery Method): room air        PHYSICAL EXAM:  Constitutional: NAD  Eyes: JOHN, EOMI  Ear/Nose/Throat: no oral lesion, no sinus tenderness on percussion	  Neck: no JVD, no lymphadenopathy, supple  Respiratory: CTA keerthi  Cardiovascular: S1S2 RRR, no murmurs  Gastrointestinal:soft, (+) BS, no HSM  Extremities:no e/e/c  Vascular: DP Pulse:	right normal; left normal      LABS:                        7.4    9.03  )-----------( 421      ( 17 Feb 2023 06:07 )             23.4     02-17    138  |  110<H>  |  5<L>  ----------------------------<  107<H>  3.9   |  20<L>  |  0.54    Ca    8.2<L>      17 Feb 2023 06:07  Phos  2.7     02-17  Mg     1.7     02-17            MICROBIOLOGY: reviewed    RADIOLOGY & ADDITIONAL STUDIES: reviewed

## 2023-02-17 NOTE — CHART NOTE - NSCHARTNOTEFT_GEN_A_CORE
The writer met with the patient for f/u individual psychotherapy. The writer and patient explored the patient's response to his recent medical procedure and current hospitalization. Supportive therapy was provided. No SHIIPAVH reported. The patient was meaningfully engaged and receptive to these interventions. Patient ended the session in good behavioral and emotional control. Psychology will continue to follow.

## 2023-02-17 NOTE — PROGRESS NOTE ADULT - ASSESSMENT
77M with PMHx of IVDU found unresponsive at his nursing home, resolved after Narcan in the field and brought to [facility]. Found to have PE, atrial flutter, and large LV thrombus on echo. Transferred to [facility] for further management. Pt c/o abdominal pain on 12/10 CTA showing mid SMA with embolus. Abnormal distal small bowel loops and cecum with dilatation and pneumatosis suggesting infarcted bowel. One or two tiny foci of intrahepatic portal vein pneumatosis. Segmental infarction upper pole left kidney. Now s/p Ex lap, SMA embolectomy, 80cm SBR, abthera vac left in discontinuity (12/11) and transferred to SICU intubated. S/p second look (12/13) and most recently s/p OR for 3rd look, end-to-end anastomosis of remaining bowel, loop ileostomy and abdomen closure (12/15). S/p LORENZO and Cardioversion on 12/30 with cardiology. Now stepped down to telemetry. LLE ischemia, AKA delayed by nutritional optimization. Transferred to SICU in the setting of sepsis for HD monitoring.     Plan:     Neuro: Tylenol and Oxycodone PRN.   CV: Hypotension 2/2 sepsis/hypovolemia. Spirolactone held. IV abx started. Hypovolemic: Bolus as needed. D5LR started at 150/hr. Repeat echo with EF 55-60%. Mildly dilated RV. Maintain MAP >65. A.Fib/Flutter: s/p cardioversion on 12/30. Continue metoprolol/amiodarone   Pulm: Sating well on nasal canula   GI/FEN: Regular diet. High Ostomy output. Replete 1-1. Continue Immodium/Tincture of Opium. PPN discontinued in the setting of bacteremia. D5LR started.   : Condom cath in place. Strict I and O.   Endo: mISS  Heme: Anemia H/H 7.4/22.5 s/p 2uPRBC (02/11). LV thrombis w/ LLE ischemia. Heparin drip >> SQL 65BID. Active T&S;   ID: Sepsis 2/2 K. Pneumoniae/Enterococcus VRE Bacteremia: Undrainable abscess in Abd, _ LLExt ischemia with associated infection: AKA on 02/15.Cont: Luis (2/10--) (Dapto 2/11--)   dc//:: Zosyn. Lloyd (2/10--2/11).  PPX: SCDs, SQL 65BID.   L/D/T: PIV  PT/OT: Not ordered  Dispo: SDU, ROWAN

## 2023-02-17 NOTE — PROGRESS NOTE ADULT - SUBJECTIVE AND OBJECTIVE BOX
24 hr events: : 7pm 1L given for high ostomy,     SUBJECTIVE: Patient seen at bedside sleeping peacefully,  No CP, SOB, fevers or chills.       Vital Signs Last 24 Hrs  T(C): 37.3 (17 Feb 2023 09:16), Max: 37.4 (17 Feb 2023 09:00)  T(F): 99.1 (17 Feb 2023 09:16), Max: 99.3 (17 Feb 2023 09:00)  HR: 96 (17 Feb 2023 13:00) (83 - 104)  BP: 104/66 (17 Feb 2023 13:00) (85/57 - 131/77)  BP(mean): 81 (17 Feb 2023 13:00) (67 - 99)  RR: 28 (17 Feb 2023 13:00) (16 - 28)  SpO2: 97% (17 Feb 2023 13:00) (93% - 100%)    Parameters below as of 17 Feb 2023 13:00  Patient On (Oxygen Delivery Method): room air        PHYSICAL EXAM:   General: NAD, noted to be in mild discomfort due to leg pain  HEENT: NC/AT, EOMI, PERRLA  Pulmonary: B/l breath sounds, nonlabored breathing, no r/r/w  Cardiovascular: S1 s2, NSR, no m/r/g  Abdominal: Soft, NTND, ostomy with yellow liquid stool in bag  Extremities: Left AKA stump dressed with kerlix/ACE bandage, saturated with serosanguinous fluid  Lines/drains/tubes:    I&O's Summary    16 Feb 2023 07:01  -  17 Feb 2023 07:00  --------------------------------------------------------  IN: 6007 mL / OUT: 5850 mL / NET: 157 mL    17 Feb 2023 07:01  -  17 Feb 2023 13:42  --------------------------------------------------------  IN: 1865 mL / OUT: 750 mL / NET: 1115 mL        LABS:                        7.4    9.03  )-----------( 421      ( 17 Feb 2023 06:07 )             23.4     02-17    138  |  110<H>  |  5<L>  ----------------------------<  107<H>  3.9   |  20<L>  |  0.54    Ca    8.2<L>      17 Feb 2023 06:07  Phos  2.7     02-17  Mg     1.7     02-17          CAPILLARY BLOOD GLUCOSE      POCT Blood Glucose.: 99 mg/dL (17 Feb 2023 11:19)  POCT Blood Glucose.: 103 mg/dL (16 Feb 2023 23:49)  POCT Blood Glucose.: 98 mg/dL (16 Feb 2023 17:37)        RADIOLOGY & ADDITIONAL STUDIES:

## 2023-02-17 NOTE — PROGRESS NOTE ADULT - ASSESSMENT
77M with PMHx of IVDU found unresponsive at his nursing home, resolved after Narcan in the field and brought to [facility]. Found to have PE, atrial flutter, and large LV thrombus on echo and CTA showing mid SMA with embolus s/p Ex lap, SMA embolectomy, 80cm SBR, abthera vac left in discontinuity (12/11) and transferred to SICU intubated. S/p second look (12/13) and most recently s/p OR for 3rd look, end-to-end anastomosis of remaining bowel, loop ileostomy and abdomen closure (12/15). Now stepped down to telemtry. LLE ischemia, AKA delayed by nutritional optimization. Transferred to SICU in the setting of sepsis for HD monitoring. 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

## 2023-02-18 NOTE — PROGRESS NOTE ADULT - ASSESSMENT
78M with PMHx of IVDU found unresponsive at his nursing home, resolved after Narcan in the field and brought to [facility]. Found to have PE, atrial flutter, and large LV thrombus on ECHOand CTA showing mid SMA with embolus s/p Ex lap, SMA embolectomy, 80cm SBR, abthera vac left in discontinuity (12/11) and transferred to SICU intubated. S/p second look (12/13) and most recently s/p OR for 3rd look, end-to-end anastomosis of remaining bowel, loop ileostomy and abdomen closure (12/15). Prev stepped down to telemetry. LLE ischemia, AKA delayed by nutritional optimization. Transferred to SICU in the setting of sepsis for HD monitoring. 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    - Maintain surgical dressing, can reinforce as needed  - Plan for RTOR for closure of L AKA on Wednesday  - Remainder of care per primary team  - Vascular will continue to follow

## 2023-02-18 NOTE — PROGRESS NOTE ADULT - SUBJECTIVE AND OBJECTIVE BOX
HOSPITALIST COMGMT PROGRESS NOTE    Interval events:     Subjective: patient complaining of significant pain at amputation site after dressing change, also mentions swelling around right eye without any associated pain or visual changes      Remaining ROS negative       PHYSICAL EXAM:    PHYSICAL EXAM:    General: no acute distress, resting in bed  HEENT: AT/NC, no facial asymmetry  Lungs: breathing is nonlabored  Heart: not tachycardic today, s1s2  Abdomen: soft, non-tender  Extremities: left leg dry gangrene, no gross focal deficit     VITAL SIGNS:  Vital Signs Last 24 Hrs  T(C): 37.7 (2023 09:00), Max: 38.3 (2023 15:26)  T(F): 99.8 (2023 09:00), Max: 101 (2023 15:26)  HR: 98 (2023 12:00) (90 - 120)  BP: 132/58 (2023 12:00) (100/55 - 132/58)  BP(mean): 84 (2023 12:00) (69 - 84)  RR: 18 (2023 12:00) (15 - 18)  SpO2: 100% (2023 12:00) (95% - 100%)    Parameters below as of 2023 12:00  Patient On (Oxygen Delivery Method): room air          MEDICATIONS:  MEDICATIONS  (STANDING):  aMIOdarone    Tablet 100 milliGRAM(s) Oral every 24 hours  ascorbic acid 500 milliGRAM(s) Oral daily  chlorhexidine 2% Cloths 1 Application(s) Topical <User Schedule>  chlorhexidine 2% Cloths 1 Application(s) Topical <User Schedule>  dextrose 5%. 1000 milliLiter(s) (50 mL/Hr) IV Continuous <Continuous>  dextrose 5%. 1000 milliLiter(s) (100 mL/Hr) IV Continuous <Continuous>  diphenoxylate/atropine 2 Tablet(s) Oral every 8 hours  enoxaparin Injectable 60 milliGRAM(s) SubCutaneous every 12 hours  fat emulsion (Fish Oil and Plant Based) 20% Infusion 0.7764 Gm/kG/Day (20.83 mL/Hr) IV Continuous <Continuous>  glucagon  Injectable 1 milliGRAM(s) IntraMuscular once  influenza  Vaccine (HIGH DOSE) 0.7 milliLiter(s) IntraMuscular once  insulin lispro (ADMELOG) corrective regimen sliding scale   SubCutaneous every 6 hours  loperamide 4 milliGRAM(s) Oral every 8 hours  metoprolol succinate ER 25 milliGRAM(s) Oral daily  mirtazapine 30 milliGRAM(s) Oral at bedtime  multivitamin 1 Tablet(s) Oral daily  opium Tincture 6 milliGRAM(s) Oral every 12 hours  pantoprazole    Tablet 40 milliGRAM(s) Oral two times a day  Parenteral Nutrition - Adult 1 Each (83 mL/Hr) TPN Continuous <Continuous>  Parenteral Nutrition - Adult 1 Each (83 mL/Hr) TPN Continuous <Continuous>  piperacillin/tazobactam IVPB.. 3.375 Gram(s) IV Intermittent every 8 hours  psyllium Powder 1 Packet(s) Oral every 8 hours  sacubitril 24 mG/valsartan 26 mG 1 Tablet(s) Oral two times a day  spironolactone 25 milliGRAM(s) Oral daily  zinc sulfate 220 milliGRAM(s) Oral daily    MEDICATIONS  (PRN):  acetaminophen     Tablet .. 650 milliGRAM(s) Oral every 6 hours PRN Temp greater or equal to 38C (100.4F), Mild Pain (1 - 3)  dextrose Oral Gel 15 Gram(s) Oral once PRN Blood Glucose LESS THAN 70 milliGRAM(s)/deciliter  melatonin 3 milliGRAM(s) Oral at bedtime PRN Insomnia  ondansetron Injectable 4 milliGRAM(s) IV Push every 6 hours PRN Nausea and/or Vomiting  oxyCODONE    IR 5 milliGRAM(s) Oral every 6 hours PRN Severe Pain (7 - 10)  sodium chloride 0.9% lock flush 10 milliLiter(s) IV Push every 1 hour PRN Pre/post blood products, medications, blood draw, and to maintain line patency      ALLERGIES:  Allergies    No Known Allergies    Intolerances        LABS:                        7.4    11.86 )-----------( 577      ( 2023 05:30 )             22.8     02-07    136  |  101  |  26<H>  ----------------------------<  128<H>  3.8   |  25  |  0.69    Ca    8.3<L>      2023 05:30  Phos  2.6     02-07  Mg     1.9     02-07    TPro  6.7  /  Alb  2.1<L>  /  TBili  0.7  /  DBili  x   /  AST  20  /  ALT  27  /  AlkPhos  154<H>  -      Urinalysis Basic - ( 2023 16:16 )    Color: Yellow / Appearance: SL Cloudy / S.015 / pH: x  Gluc: x / Ketone: NEGATIVE  / Bili: Negative / Urobili: 0.2 E.U./dL   Blood: x / Protein: 30 mg/dL / Nitrite: NEGATIVE   Leuk Esterase: Trace / RBC: < 5 /HPF / WBC < 5 /HPF   Sq Epi: x / Non Sq Epi: 0-5 /HPF / Bacteria: Many /HPF      CAPILLARY BLOOD GLUCOSE      POCT Blood Glucose.: 159 mg/dL (2023 17:02)      RADIOLOGY & ADDITIONAL TESTS: Reviewed. HOSPITALIST COMGMT PROGRESS NOTE    Interval events: Patient being seen for the first time since 2/10 as was in SICU. Was rapid response for tachycardia, hypotension, fever on 2/10 felt to be sepsis 2/2 intraabdominal abscess - found to have klebsiella bacteremia. Abscess unable to be drained per IR so treated with abx only. Surveillance bcx cleared. On erta/dapto. Had AKA on 2/15 for critical limb ischemia. Plan for RTOR for closure of L AKA on Wednesday      Subjective: patient complaining of significant pain at amputation site after dressing change, also mentions swelling around right eye without any associated pain or visual changes      Remaining ROS negative       PHYSICAL EXAM:    PHYSICAL EXAM:    General: no acute distress, resting in bed  HEENT: right eye with imelda-orbital edema, no erythema, warmth, tenderness  Neuro: no visual deficits, PERRLA  Lungs: breathing is nonlabored  Heart: not tachycardic today, s1s2  Abdomen: soft, ostomy in place  Extremities: left leg s/p aka with dressing in place    VITAL SIGNS:  Vital Signs Last 24 Hrs  T(C): 37.7 (2023 09:00), Max: 38.3 (2023 15:26)  T(F): 99.8 (2023 09:00), Max: 101 (2023 15:26)  HR: 98 (2023 12:00) (90 - 120)  BP: 132/58 (2023 12:00) (100/55 - 132/58)  BP(mean): 84 (2023 12:00) (69 - 84)  RR: 18 (2023 12:00) (15 - 18)  SpO2: 100% (2023 12:00) (95% - 100%)    Parameters below as of 2023 12:00  Patient On (Oxygen Delivery Method): room air          MEDICATIONS:  MEDICATIONS  (STANDING):  aMIOdarone    Tablet 100 milliGRAM(s) Oral every 24 hours  ascorbic acid 500 milliGRAM(s) Oral daily  chlorhexidine 2% Cloths 1 Application(s) Topical <User Schedule>  chlorhexidine 2% Cloths 1 Application(s) Topical <User Schedule>  dextrose 5%. 1000 milliLiter(s) (50 mL/Hr) IV Continuous <Continuous>  dextrose 5%. 1000 milliLiter(s) (100 mL/Hr) IV Continuous <Continuous>  diphenoxylate/atropine 2 Tablet(s) Oral every 8 hours  enoxaparin Injectable 60 milliGRAM(s) SubCutaneous every 12 hours  fat emulsion (Fish Oil and Plant Based) 20% Infusion 0.7764 Gm/kG/Day (20.83 mL/Hr) IV Continuous <Continuous>  glucagon  Injectable 1 milliGRAM(s) IntraMuscular once  influenza  Vaccine (HIGH DOSE) 0.7 milliLiter(s) IntraMuscular once  insulin lispro (ADMELOG) corrective regimen sliding scale   SubCutaneous every 6 hours  loperamide 4 milliGRAM(s) Oral every 8 hours  metoprolol succinate ER 25 milliGRAM(s) Oral daily  mirtazapine 30 milliGRAM(s) Oral at bedtime  multivitamin 1 Tablet(s) Oral daily  opium Tincture 6 milliGRAM(s) Oral every 12 hours  pantoprazole    Tablet 40 milliGRAM(s) Oral two times a day  Parenteral Nutrition - Adult 1 Each (83 mL/Hr) TPN Continuous <Continuous>  Parenteral Nutrition - Adult 1 Each (83 mL/Hr) TPN Continuous <Continuous>  piperacillin/tazobactam IVPB.. 3.375 Gram(s) IV Intermittent every 8 hours  psyllium Powder 1 Packet(s) Oral every 8 hours  sacubitril 24 mG/valsartan 26 mG 1 Tablet(s) Oral two times a day  spironolactone 25 milliGRAM(s) Oral daily  zinc sulfate 220 milliGRAM(s) Oral daily    MEDICATIONS  (PRN):  acetaminophen     Tablet .. 650 milliGRAM(s) Oral every 6 hours PRN Temp greater or equal to 38C (100.4F), Mild Pain (1 - 3)  dextrose Oral Gel 15 Gram(s) Oral once PRN Blood Glucose LESS THAN 70 milliGRAM(s)/deciliter  melatonin 3 milliGRAM(s) Oral at bedtime PRN Insomnia  ondansetron Injectable 4 milliGRAM(s) IV Push every 6 hours PRN Nausea and/or Vomiting  oxyCODONE    IR 5 milliGRAM(s) Oral every 6 hours PRN Severe Pain (7 - 10)  sodium chloride 0.9% lock flush 10 milliLiter(s) IV Push every 1 hour PRN Pre/post blood products, medications, blood draw, and to maintain line patency      ALLERGIES:  Allergies    No Known Allergies    Intolerances        LABS:                        7.4    11.86 )-----------( 577      ( 2023 05:30 )             22.8     02-07    136  |  101  |  26<H>  ----------------------------<  128<H>  3.8   |  25  |  0.69    Ca    8.3<L>      2023 05:30  Phos  2.6     02-07  Mg     1.9     -07    TPro  6.7  /  Alb  2.1<L>  /  TBili  0.7  /  DBili  x   /  AST  20  /  ALT  27  /  AlkPhos  154<H>  07      Urinalysis Basic - ( 2023 16:16 )    Color: Yellow / Appearance: SL Cloudy / S.015 / pH: x  Gluc: x / Ketone: NEGATIVE  / Bili: Negative / Urobili: 0.2 E.U./dL   Blood: x / Protein: 30 mg/dL / Nitrite: NEGATIVE   Leuk Esterase: Trace / RBC: < 5 /HPF / WBC < 5 /HPF   Sq Epi: x / Non Sq Epi: 0-5 /HPF / Bacteria: Many /HPF      CAPILLARY BLOOD GLUCOSE      POCT Blood Glucose.: 159 mg/dL (2023 17:02)      RADIOLOGY & ADDITIONAL TESTS: Reviewed.

## 2023-02-18 NOTE — PROGRESS NOTE ADULT - SUBJECTIVE AND OBJECTIVE BOX
Subjective:   The patient is seen this morning with complaints of tingling and mild pain at the L amputation site. The patient is resting comfortably in bed, tolerating his diet with ostomy output.   Patient was premedication with dilaudid this AM prior to dressing change.      ROS:   Denies nausea/vomiting, chest pain/shortness of breath, calf pain, neck pain, headache or changes in vision.    Social   DAPTOmycin IVPB 500  ertapenem  IVPB 1000  aMIOdarone    Tablet 100  DAPTOmycin IVPB 500  enoxaparin Injectable 65  ertapenem  IVPB 1000  metoprolol succinate ER 25      Allergies    No Known Allergies    Intolerances        Vital Signs Last 24 Hrs  T(C): 36.6 (18 Feb 2023 09:07), Max: 37.3 (17 Feb 2023 17:55)  T(F): 97.8 (18 Feb 2023 09:07), Max: 99.1 (17 Feb 2023 17:55)  HR: 84 (18 Feb 2023 08:39) (84 - 104)  BP: 133/64 (18 Feb 2023 08:39) (92/56 - 142/68)  BP(mean): 91 (18 Feb 2023 08:39) (67 - 93)  RR: 18 (18 Feb 2023 08:39) (18 - 28)  SpO2: 95% (18 Feb 2023 08:39) (93% - 100%)    Parameters below as of 18 Feb 2023 08:39  Patient On (Oxygen Delivery Method): room air      I&O's Summary    17 Feb 2023 07:01  -  18 Feb 2023 07:00  --------------------------------------------------------  IN: 5685 mL / OUT: 4100 mL / NET: 1585 mL    18 Feb 2023 07:01  -  18 Feb 2023 10:51  --------------------------------------------------------  IN: 200 mL / OUT: 400 mL / NET: -200 mL        Physical Exam:  General: Patient is doing well and lying in bed comfortably  Constitutional: alert and oriented   Pulm: Nonlabored breathing, no respiratory distress  CV: Regular rate and rhythm, normal sinus rhythm  Abd:  soft, nontender, nondistended. No rebound, no guarding. Ostomy in place  Extremities: RLE, WWP normal strength; L AKA with tenderness. Dressing change, no active bleeding. Tissue appears pink and moist. No drainage. Dressed with WTD kerlix and ace wrap      LABS:                        7.4    9.03  )-----------( 421      ( 17 Feb 2023 06:07 )             23.4     02-17    138  |  110<H>  |  5<L>  ----------------------------<  107<H>  3.9   |  20<L>  |  0.54    Ca    8.2<L>      17 Feb 2023 06:07  Phos  2.7     02-17  Mg     1.7     02-17          Radiology and Additional Studies:

## 2023-02-18 NOTE — PROGRESS NOTE ADULT - SUBJECTIVE AND OBJECTIVE BOX
INTERVAL HPI/OVERNIGHT EVENTS:  Overnight, 1L LR given @6pm. 500cc LR given. 500 cc bolus @ MN. Patient seen and examined by chief resident and team on AM rounds. Ostomy put out 300cc since midnight. Given 300cc bolus at 6am as repletion.     SUBJECTIVE:  MEDICATIONS  (STANDING):  aMIOdarone    Tablet 100 milliGRAM(s) Oral every 24 hours  ascorbic acid 500 milliGRAM(s) Oral daily  cholestyramine Powder (Sugar-Free) 2 Gram(s) Oral three times a day  DAPTOmycin IVPB 500 milliGRAM(s) IV Intermittent every 24 hours  dextrose 5% + lactated ringers. 1000 milliLiter(s) (100 mL/Hr) IV Continuous <Continuous>  diphenoxylate/atropine 2 Tablet(s) Oral every 8 hours  enoxaparin Injectable 65 milliGRAM(s) SubCutaneous every 12 hours  ertapenem  IVPB 1000 milliGRAM(s) IV Intermittent every 24 hours  influenza  Vaccine (HIGH DOSE) 0.7 milliLiter(s) IntraMuscular once  loperamide 4 milliGRAM(s) Oral every 8 hours  metoprolol succinate ER 25 milliGRAM(s) Oral daily  mirtazapine 30 milliGRAM(s) Oral at bedtime  multivitamin 1 Tablet(s) Oral daily  opium Tincture 6 milliGRAM(s) Oral every 12 hours  pantoprazole    Tablet 40 milliGRAM(s) Oral two times a day  povidone iodine 10% Solution 1 Application(s) Topical daily  psyllium Powder 1 Packet(s) Oral every 8 hours  zinc sulfate 220 milliGRAM(s) Oral daily    MEDICATIONS  (PRN):  acetaminophen     Tablet .. 650 milliGRAM(s) Oral every 6 hours PRN Temp greater or equal to 38C (100.4F), Mild Pain (1 - 3)  melatonin 3 milliGRAM(s) Oral at bedtime PRN Insomnia  ondansetron Injectable 4 milliGRAM(s) IV Push every 6 hours PRN Nausea and/or Vomiting  oxyCODONE    IR 5 milliGRAM(s) Oral every 4 hours PRN Severe Pain (7 - 10)  sodium chloride 0.9% lock flush 10 milliLiter(s) IV Push every 1 hour PRN Pre/post blood products, medications, blood draw, and to maintain line patency      Vital Signs Last 24 Hrs  T(C): 36.6 (18 Feb 2023 09:07), Max: 37.3 (17 Feb 2023 17:55)  T(F): 97.8 (18 Feb 2023 09:07), Max: 99.1 (17 Feb 2023 17:55)  HR: 84 (18 Feb 2023 08:39) (84 - 96)  BP: 133/64 (18 Feb 2023 08:39) (92/56 - 142/68)  BP(mean): 91 (18 Feb 2023 08:39) (67 - 93)  RR: 18 (18 Feb 2023 08:39) (18 - 28)  SpO2: 95% (18 Feb 2023 08:39) (93% - 100%)    Parameters below as of 18 Feb 2023 08:39  Patient On (Oxygen Delivery Method): room air        PHYSICAL EXAM:  General: NAD, noted to be in mild discomfort due to leg pain  HEENT: mucous membranes moist  Pulmonary: B/l breath sounds, nonlabored breathing, no r/r/w  Cardiovascular: S1 s2, NSR, no m/r/g  Abdominal: Soft, NTND, ostomy with yellow liquid stool in bag  Extremities: Left AKA stump dressed with kerlix/ACE bandage, saturated with serosanguinous fluid      I&O's Detail    17 Feb 2023 07:01  -  18 Feb 2023 07:00  --------------------------------------------------------  IN:    dextrose 5% + lactated ringers: 2400 mL    IV PiggyBack: 25 mL    IV PiggyBack: 50 mL    IV PiggyBack: 250 mL    Lactated Ringers Bolus: 2300 mL    Oral Fluid: 660 mL  Total IN: 5685 mL    OUT:    Ileostomy (mL): 2875 mL    Voided (mL): 1225 mL  Total OUT: 4100 mL    Total NET: 1585 mL      18 Feb 2023 07:01  -  18 Feb 2023 12:30  --------------------------------------------------------  IN:    dextrose 5% + lactated ringers: 200 mL  Total IN: 200 mL    OUT:    Ileostomy (mL): 400 mL  Total OUT: 400 mL    Total NET: -200 mL          LABS:                        7.4    9.03  )-----------( 421      ( 17 Feb 2023 06:07 )             23.4     02-17    138  |  110<H>  |  5<L>  ----------------------------<  107<H>  3.9   |  20<L>  |  0.54    Ca    8.2<L>      17 Feb 2023 06:07  Phos  2.7     02-17  Mg     1.7     02-17            RADIOLOGY & ADDITIONAL STUDIES:

## 2023-02-18 NOTE — PROGRESS NOTE ADULT - ASSESSMENT
77M first presented to [facility] from [facility] due to unresponsiveness (responded to Narcan). At [facility], found to have subsegmental pulmonary embolism in RUL, rapid atrial flutter with severely reduced LVEF with LV thrombus. Transferred to [facility] on 12/7/22 for LORENZO and possible ablation. At [facility], was found to have left leg ischemia (left leg arterial thrombus on LE duplex on 12/8). Was being considered for rhythm control when he developed abdominal pain, CTA (12/10/22) showed embolus in SMA, bowel infarct, requiring ex-lap on 12/11 with SMA embolectomy, 80cm small bowel resection; On 12/13, underwent 2nd look laparotomy, with 80cm small bowel resection, closure with abthera. On 12/15, underwent 3rd look laparotomy, end-to-end anastomosis of remaining bowel, loop ileostomy and abdominal closure. Now extubated, on tele floor. Eventually underwent LORENZO/DCCV on 12/30/22, now in sinus rhythm. Currently being evaluated for possible above knee amputation for LLE ischemia (possibly deferring till next hospitalization, after optimization of nutritional status).      #Fever  #Leukocytosis   -Pt with CT abd/pelvis which showed fluid collection. Pt was seen by IR re: fluid collection however the pt's imaging was reviewed by IR and it was determined that the fluid collection is not amenable to percutaneous drainage d/t bowel obstructing a safe window.  S/p GOC discussion with palliative care, and planning for return to OR for further surgery  - Continue to monitor WBC, patient has been refusing labs   - Appreciate ID. On Pip/Tazo currently  - possible OR this week     #Hypotension  Difficult to assess etiology in view of comorbidities and lack of labs over the last few days. Primary team attempting to obtain labs, however patient has been refusing.   Improving BP, optimize patient volume status.     #Hyponatremia   - improved today - 136    # Left leg ischemia   - plan per vascular and primary team    #Suicidal Ideation  #Insomnia   - Pt was placed back on 1:1 observation for suicidality.   - Continuing mirtazapine to 30mg QHS for ongoing depressive sx and insomnia    # Acute Systolic Heart Failure   - Metoprolol succinate 25mg qd - continue    # Atrial Flutter  - s/p DCCV 12/30  - EP recs - uninterrupted AC x 30 days ; January 29th was 30 days post DCCV.   -Continue Lovenox, amiodarone and metoprolol succinate    # Pulmonary embolism (subsegmental RUL)   - After completion of AC for DCCV, would need to continue AC for PE - continuing lovenox at this time  - still minimally tachycardic, but on exam this morning patient was no longer tachycardia - HR in the 80s - potentially presence of PE contributes to tachycardia. Otherwise hemodynamics are stable.     # Bowel ischemia - s/p SMA embolectomy; Small bowel resection  - ileostomy in place  - plan of primary team  - loperamide and diphenoxylate atropine    # Severe protein-calorie malnutrition  # Sacral wound   - needs nutritional optimization  - Wound care per nursing protocol     #DVT ppx -  lovenox for PE   Discussed with primary team    77M first presented to [facility] from [facility] due to unresponsiveness (responded to Narcan). At [facility], found to have subsegmental pulmonary embolism in RUL, rapid atrial flutter with severely reduced LVEF with LV thrombus. Transferred to [facility] on 12/7/22 for LORENZO and possible ablation. At [facility], was found to have left leg ischemia (left leg arterial thrombus on LE duplex on 12/8). Was being considered for rhythm control when he developed abdominal pain, CTA (12/10/22) showed embolus in SMA, bowel infarct, requiring ex-lap on 12/11 with SMA embolectomy, 80cm small bowel resection; On 12/13, underwent 2nd look laparotomy, with 80cm small bowel resection, closure with abthera. On 12/15, underwent 3rd look laparotomy, end-to-end anastomosis of remaining bowel, loop ileostomy and abdominal closure. Eventually underwent LORENZO/DCCV on 12/30/22, now in sinus rhythm. More recently found to have k. 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     #bacteremia - k pneumoniae  -cultures cleared, on erta/dapto  -management per ID  -abscess not drainable per IR    #s/p left AKA  management per primary team/ vascular  RTOR next week for closure    #anemia  hg 7.4 yesterday, refused labs today    #Suicidal Ideation  #Insomnia   being seen by psych, currently without SI  - Continuing mirtazapine to 30mg QHS for ongoing depressive sx and insomnia    # Acute Systolic Heart Failure   - Metoprolol succinate 25mg qd - continue    # Atrial Flutter  - s/p DCCV 12/30  - EP recs - uninterrupted AC x 30 days ; January 29th was 30 days post DCCV.   -Continue Lovenox, amiodarone and metoprolol succinate    # Pulmonary embolism (subsegmental RUL)   - After completion of AC for DCCV, would need to continue AC for PE - continuing lovenox at this time    # Bowel ischemia - s/p SMA embolectomy; Small bowel resection  - ileostomy in place  - plan of primary team  - loperamide and diphenoxylate atropine    # Severe protein-calorie malnutrition  # Sacral wound   - needs nutritional optimization  - Wound care per nursing protocol     #DVT ppx -  lovenox for PE

## 2023-02-18 NOTE — PROGRESS NOTE ADULT - ASSESSMENT
77M with PMHx of IVDU found unresponsive at his nursing home, resolved after Narcan in the field and brought to [facility]. Found to have PE, atrial flutter, and large LV thrombus on echo and CTA showing mid SMA with embolus s/p Ex lap, SMA embolectomy, 80cm SBR, abthera vac left in discontinuity (12/11) and transferred to SICU intubated. S/p second look (12/13) and most recently s/p OR for 3rd look, end-to-end anastomosis of remaining bowel, loop ileostomy and abdomen closure (12/15). Now stepped down to telemtry. LLE ischemia, AKA delayed by nutritional optimization. Transferred to SICU in the setting of sepsis for HD monitoring. 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

## 2023-02-18 NOTE — PROVIDER CONTACT NOTE (OTHER) - ASSESSMENT
While administering morning medications I saw that the patient's R face was swollen compared to the other side. Swelling most prominent around the R eye (Periorbital) as well as R lip. Patient denies knowledge of swelling. Patient was laying on R side since arriving on [address] [place]. Patient had no oral intake prior to morning medications. Patient swallows well - no difficulty seen. Patient R eye not teary. Swollen area not firm.

## 2023-02-19 NOTE — PROGRESS NOTE ADULT - ASSESSMENT
77M with PMHx of IVDU found unresponsive at his nursing home, resolved after Narcan in the field and brought to [facility]. Found to have PE, atrial flutter, and large LV thrombus on echo and CTA showing mid SMA with embolus s/p Ex lap, SMA embolectomy, 80cm SBR, abthera vac left in discontinuity (12/11) and transferred to SICU intubated. S/p second look (12/13) and most recently s/p OR for 3rd look, end-to-end anastomosis of remaining bowel, loop ileostomy and abdomen closure (12/15). Now stepped down to telemtry. LLE ischemia, AKA delayed by nutritional optimization. Transferred to SICU in the setting of sepsis for HD monitoring. 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

## 2023-02-19 NOTE — PROGRESS NOTE ADULT - ASSESSMENT
77M first presented to [facility] from [facility] due to unresponsiveness (responded to Narcan). At [facility], found to have subsegmental pulmonary embolism in RUL, rapid atrial flutter with severely reduced LVEF with LV thrombus. Transferred to [facility] on 12/7/22 for LORENZO and possible ablation. At [facility], was found to have left leg ischemia (left leg arterial thrombus on LE duplex on 12/8). Was being considered for rhythm control when he developed abdominal pain, CTA (12/10/22) showed embolus in SMA, bowel infarct, requiring ex-lap on 12/11 with SMA embolectomy, 80cm small bowel resection; On 12/13, underwent 2nd look laparotomy, with 80cm small bowel resection, closure with abthera. On 12/15, underwent 3rd look laparotomy, end-to-end anastomosis of remaining bowel, loop ileostomy and abdominal closure. Eventually underwent LORENZO/DCCV on 12/30/22, now in sinus rhythm. More recently found to have k. 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     #bacteremia - k pneumoniae  -cultures cleared, on erta/dapto  -management per ID  -abscess not drainable per IR    #s/p left AKA  management per primary team/ vascular  RTOR next week for closure    #anemia  hg improved to 8.1 today    #scleral icterus  -please add on CMP today with direct and indirect bili    #Suicidal Ideation  #Insomnia   being seen by psych, currently without SI  - Continuing mirtazapine to 30mg QHS for ongoing depressive sx and insomnia    # Acute Systolic Heart Failure   - Metoprolol succinate 25mg qd - continue    # Atrial Flutter  - s/p DCCV 12/30  - EP recs - uninterrupted AC x 30 days ; January 29th was 30 days post DCCV.   -Continue Lovenox, amiodarone and metoprolol succinate    # Pulmonary embolism (subsegmental RUL)   - After completion of AC for DCCV, would need to continue AC for PE - continuing lovenox at this time    # Bowel ischemia - s/p SMA embolectomy; Small bowel resection  - ileostomy in place  - plan of primary team  - loperamide and diphenoxylate atropine    # Severe protein-calorie malnutrition  # Sacral wound   - needs nutritional optimization  - Wound care per nursing protocol     #DVT ppx -  lovenox for PE

## 2023-02-19 NOTE — PROGRESS NOTE ADULT - SUBJECTIVE AND OBJECTIVE BOX
SUBJECTIVE: Patient seen and examined bedside by chief resident. No acute complaints this morning. Denies F/C/CP/SOB/N/V    aMIOdarone    Tablet 100 milliGRAM(s) Oral every 24 hours  DAPTOmycin IVPB 500 milliGRAM(s) IV Intermittent every 24 hours  enoxaparin Injectable 65 milliGRAM(s) SubCutaneous every 12 hours  ertapenem  IVPB 1000 milliGRAM(s) IV Intermittent every 24 hours  metoprolol succinate ER 25 milliGRAM(s) Oral daily      Vital Signs Last 24 Hrs  T(C): 36.8 (18 Feb 2023 21:44), Max: 37.1 (18 Feb 2023 18:00)  T(F): 98.2 (18 Feb 2023 21:44), Max: 98.8 (18 Feb 2023 18:00)  HR: 86 (19 Feb 2023 04:50) (84 - 90)  BP: 125/60 (19 Feb 2023 04:50) (122/65 - 133/64)  BP(mean): 84 (19 Feb 2023 04:50) (82 - 91)  RR: 17 (19 Feb 2023 04:50) (16 - 18)  SpO2: 100% (19 Feb 2023 04:50) (95% - 100%)    Parameters below as of 19 Feb 2023 04:50  Patient On (Oxygen Delivery Method): room air      I&O's Detail    17 Feb 2023 07:01  -  18 Feb 2023 07:00  --------------------------------------------------------  IN:    dextrose 5% + lactated ringers: 2400 mL    IV PiggyBack: 25 mL    IV PiggyBack: 50 mL    IV PiggyBack: 250 mL    Lactated Ringers Bolus: 2300 mL    Oral Fluid: 660 mL  Total IN: 5685 mL    OUT:    Ileostomy (mL): 2875 mL    Voided (mL): 1225 mL  Total OUT: 4100 mL    Total NET: 1585 mL      18 Feb 2023 07:01  -  19 Feb 2023 06:59  --------------------------------------------------------  IN:    dextrose 5% + lactated ringers: 1900 mL    Lactated Ringers Bolus: 300 mL  Total IN: 2200 mL    OUT:    Ileostomy (mL): 1975 mL    Voided (mL): 1150 mL  Total OUT: 3125 mL    Total NET: -925 mL        PHYSICAL EXAM:  General: NAD, noted to be in mild discomfort due to leg pain  HEENT: mucous membranes moist  Pulmonary: B/l breath sounds, nonlabored breathing, no r/r/w  Cardiovascular: S1 s2, NSR, no m/r/g  Abdominal: Soft, NTND, ostomy with yellow liquid stool in bag  Extremities: Left AKA stump dressed with kerlix/ACE bandage, saturated with serosanguinous fluid        LABS:                RADIOLOGY & ADDITIONAL STUDIES:

## 2023-02-19 NOTE — CHART NOTE - NSCHARTNOTEFT_GEN_A_CORE
78M with PMHx of IVDU found unresponsive at his nursing home, resolved after Narcan in the field and brought to [facility]. Found to have PE, atrial flutter, and large LV thrombus on ECHOand CTA showing mid SMA with embolus s/p Ex lap, SMA embolectomy, 80cm SBR, abthera vac left in discontinuity (12/11) and transferred to SICU intubated. S/p second look (12/13) and most recently s/p OR for 3rd look, end-to-end anastomosis of remaining bowel, loop ileostomy and abdomen closure (12/15). Prev stepped down to telemetry. LLE ischemia, AKA delayed by nutritional optimization. Transferred to SICU in the setting of sepsis for HD monitoring. 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    - Maintain surgical dressing, changed today, can reinforce as needed  - Plan for RTOR for closure of L AKA on Wednesday  - Remainder of care per primary team  - Vascular will continue to follow

## 2023-02-20 NOTE — PROCEDURE NOTE - NSPOSTPRCRAD_GEN_A_CORE
Confirmed with radiology line- no PTX, tube below diaphragm/post-procedure radiography performed
tip@ ra-svc junction/line in appropriate postion/postion of catheter/ultrasound
post procedure radiography not performed
central line located in the superior vena cava/depth of insertion/no pneumothorax/post-procedure radiography performed
tip at RA-SVC junction/line in appropriate postion/postion of catheter

## 2023-02-20 NOTE — PROGRESS NOTE ADULT - SUBJECTIVE AND OBJECTIVE BOX
SUBJECTIVE:  Doing well this AM. NAEON. Denies n/v. Endorses f/bm. Denies cp/sob. Pain is well controlled. Working with PT as tolerated. Tolerating diet.    MEDICATIONS  (STANDING):  aMIOdarone    Tablet 100 milliGRAM(s) Oral every 24 hours  ascorbic acid 500 milliGRAM(s) Oral daily  cholestyramine Powder (Sugar-Free) 2 Gram(s) Oral three times a day  DAPTOmycin IVPB 500 milliGRAM(s) IV Intermittent every 24 hours  dextrose 5% + lactated ringers. 1000 milliLiter(s) (100 mL/Hr) IV Continuous <Continuous>  diphenoxylate/atropine 2 Tablet(s) Oral every 8 hours  enoxaparin Injectable 65 milliGRAM(s) SubCutaneous every 12 hours  ertapenem  IVPB 1000 milliGRAM(s) IV Intermittent every 24 hours  influenza  Vaccine (HIGH DOSE) 0.7 milliLiter(s) IntraMuscular once  loperamide 4 milliGRAM(s) Oral every 8 hours  magnesium sulfate  IVPB 2 Gram(s) IV Intermittent every 1 hour  metoprolol succinate ER 25 milliGRAM(s) Oral daily  mirtazapine 30 milliGRAM(s) Oral at bedtime  multivitamin 1 Tablet(s) Oral daily  opium Tincture 6 milliGRAM(s) Oral every 12 hours  pantoprazole    Tablet 40 milliGRAM(s) Oral two times a day  potassium chloride  10 mEq/100 mL IVPB 10 milliEquivalent(s) IV Intermittent once  povidone iodine 10% Solution 1 Application(s) Topical daily  psyllium Powder 1 Packet(s) Oral every 8 hours  zinc sulfate 220 milliGRAM(s) Oral daily    MEDICATIONS  (PRN):  acetaminophen     Tablet .. 650 milliGRAM(s) Oral every 6 hours PRN Temp greater or equal to 38C (100.4F), Mild Pain (1 - 3)  melatonin 3 milliGRAM(s) Oral at bedtime PRN Insomnia  ondansetron Injectable 4 milliGRAM(s) IV Push every 6 hours PRN Nausea and/or Vomiting  oxyCODONE    IR 5 milliGRAM(s) Oral every 4 hours PRN Severe Pain (7 - 10)  sodium chloride 0.9% lock flush 10 milliLiter(s) IV Push every 1 hour PRN Pre/post blood products, medications, blood draw, and to maintain line patency      Vital Signs Last 24 Hrs  T(C): 36.9 (20 Feb 2023 05:20), Max: 37.3 (19 Feb 2023 17:09)  T(F): 98.5 (20 Feb 2023 05:20), Max: 99.1 (19 Feb 2023 17:09)  HR: 90 (20 Feb 2023 04:00) (82 - 104)  BP: 102/55 (20 Feb 2023 04:00) (101/60 - 164/76)  BP(mean): 71 (20 Feb 2023 04:00) (71 - 101)  RR: 17 (20 Feb 2023 04:00) (16 - 18)  SpO2: 100% (20 Feb 2023 01:00) (99% - 100%)    Parameters below as of 20 Feb 2023 04:00  Patient On (Oxygen Delivery Method): room air        Physical Exam:  General: NAD, resting comfortably in bed  Pulmonary: Nonlabored breathing, no respiratory distress  Cardiovascular: NSR  Abdominal: soft, NT/ND  Extremities: WWP, normal strength, L AKA w/ good granulation  Neuro: A/O x 3, CNs II-XII grossly intact, no focal deficits    I&O's Summary    19 Feb 2023 07:01  -  20 Feb 2023 07:00  --------------------------------------------------------  IN: 1700 mL / OUT: 3900 mL / NET: -2200 mL        LABS:                        7.6    9.83  )-----------( 487      ( 20 Feb 2023 05:56 )             24.5     02-20    139  |  108  |  5<L>  ----------------------------<  89  3.8   |  24  |  0.67    Ca    8.2<L>      20 Feb 2023 05:56  Phos  2.6     02-20  Mg     1.4     02-20    TPro  5.7<L>  /  Alb  2.0<L>  /  TBili  0.3  /  DBili  <0.2  /  AST  29  /  ALT  20  /  AlkPhos  140<H>  02-19    LIVER FUNCTIONS - ( 19 Feb 2023 10:00 )  Alb: 2.0 g/dL / Pro: 5.7 g/dL / ALK PHOS: 140 U/L / ALT: 20 U/L / AST: 29 U/L / GGT: x

## 2023-02-20 NOTE — PROCEDURE NOTE - NSINDICATIONS_GEN_A_CORE
feeds
arterial puncture to obtain ABG's
emergency venous access
antibiotic therapy
antibiotic therapy/emergency venous access
Total Parenteral Nutrition/TPN
critical illness/emergency venous access/venous access
Total Parenteral Nutrition/TPN
critical patient

## 2023-02-20 NOTE — PROCEDURAL SAFETY CHECKLIST WITH OR WITHOUT SEDATION - NSPOSTDEBRIEFDT_GEN_ALL_CORE
PT: Pt with good participation.  Still with redness L distal LE.  Pt with some rib and back pain, needing seated rest breaks due to discomfort.  Pt is participating with balance, and core, LE strengthening activities.  Pt with gains in static standing balance, and improvement in dizziness.  Pt is wearing the eye patch intermittently to help with diplopia, and is able to tolerate ambulation and mobility without the patch.   Cont with balance, gait gene.  Pt lives alone, but will have family assisting at times.   
30-Dec-2022 10:35
29-Dec-2022 16:48
20-Feb-2023 12:10
10-Ladarius-2023 11:30

## 2023-02-20 NOTE — PROCEDURE NOTE - NSANESTHESIA_GEN_A_CORE
no anesthesia administered
2% lidocaine
1% lidocaine
no anesthesia administered
no anesthesia administered
1% lidocaine

## 2023-02-20 NOTE — PROGRESS NOTE ADULT - SUBJECTIVE AND OBJECTIVE BOX
HOSPITALIST COMGMT PROGRESS NOTE    Interval events: None  Subjective: patient still complaining of significant pain at amputation site      Remaining ROS negative       PHYSICAL EXAM:    PHYSICAL EXAM:    General: no acute distress, resting in bed  HEENT: no periorbital swelling, icteric sclera   Neuro: no visual deficits, PERRLA  Lungs: breathing is nonlabored  Heart: not tachycardic today, s1s2  Abdomen: soft, ostomy in place  Extremities: left leg s/p aka with dressing in place    VITAL SIGNS:  Vital Signs Last 24 Hrs  T(C): 37.7 (2023 09:00), Max: 38.3 (2023 15:26)  T(F): 99.8 (2023 09:00), Max: 101 (2023 15:26)  HR: 98 (2023 12:00) (90 - 120)  BP: 132/58 (2023 12:00) (100/55 - 132/58)  BP(mean): 84 (2023 12:00) (69 - 84)  RR: 18 (2023 12:00) (15 - 18)  SpO2: 100% (2023 12:00) (95% - 100%)    Parameters below as of 2023 12:00  Patient On (Oxygen Delivery Method): room air          MEDICATIONS:  MEDICATIONS  (STANDING):  aMIOdarone    Tablet 100 milliGRAM(s) Oral every 24 hours  ascorbic acid 500 milliGRAM(s) Oral daily  chlorhexidine 2% Cloths 1 Application(s) Topical <User Schedule>  chlorhexidine 2% Cloths 1 Application(s) Topical <User Schedule>  dextrose 5%. 1000 milliLiter(s) (50 mL/Hr) IV Continuous <Continuous>  dextrose 5%. 1000 milliLiter(s) (100 mL/Hr) IV Continuous <Continuous>  diphenoxylate/atropine 2 Tablet(s) Oral every 8 hours  enoxaparin Injectable 60 milliGRAM(s) SubCutaneous every 12 hours  fat emulsion (Fish Oil and Plant Based) 20% Infusion 0.7764 Gm/kG/Day (20.83 mL/Hr) IV Continuous <Continuous>  glucagon  Injectable 1 milliGRAM(s) IntraMuscular once  influenza  Vaccine (HIGH DOSE) 0.7 milliLiter(s) IntraMuscular once  insulin lispro (ADMELOG) corrective regimen sliding scale   SubCutaneous every 6 hours  loperamide 4 milliGRAM(s) Oral every 8 hours  metoprolol succinate ER 25 milliGRAM(s) Oral daily  mirtazapine 30 milliGRAM(s) Oral at bedtime  multivitamin 1 Tablet(s) Oral daily  opium Tincture 6 milliGRAM(s) Oral every 12 hours  pantoprazole    Tablet 40 milliGRAM(s) Oral two times a day  Parenteral Nutrition - Adult 1 Each (83 mL/Hr) TPN Continuous <Continuous>  Parenteral Nutrition - Adult 1 Each (83 mL/Hr) TPN Continuous <Continuous>  piperacillin/tazobactam IVPB.. 3.375 Gram(s) IV Intermittent every 8 hours  psyllium Powder 1 Packet(s) Oral every 8 hours  sacubitril 24 mG/valsartan 26 mG 1 Tablet(s) Oral two times a day  spironolactone 25 milliGRAM(s) Oral daily  zinc sulfate 220 milliGRAM(s) Oral daily    MEDICATIONS  (PRN):  acetaminophen     Tablet .. 650 milliGRAM(s) Oral every 6 hours PRN Temp greater or equal to 38C (100.4F), Mild Pain (1 - 3)  dextrose Oral Gel 15 Gram(s) Oral once PRN Blood Glucose LESS THAN 70 milliGRAM(s)/deciliter  melatonin 3 milliGRAM(s) Oral at bedtime PRN Insomnia  ondansetron Injectable 4 milliGRAM(s) IV Push every 6 hours PRN Nausea and/or Vomiting  oxyCODONE    IR 5 milliGRAM(s) Oral every 6 hours PRN Severe Pain (7 - 10)  sodium chloride 0.9% lock flush 10 milliLiter(s) IV Push every 1 hour PRN Pre/post blood products, medications, blood draw, and to maintain line patency      ALLERGIES:  Allergies    No Known Allergies    Intolerances        LABS:                        7.4    11.86 )-----------( 577      ( 2023 05:30 )             22.8     02-07    136  |  101  |  26<H>  ----------------------------<  128<H>  3.8   |  25  |  0.69    Ca    8.3<L>      2023 05:30  Phos  2.6     02-07  Mg     1.9     02-07    TPro  6.7  /  Alb  2.1<L>  /  TBili  0.7  /  DBili  x   /  AST  20  /  ALT  27  /  AlkPhos  154<H>  02-      Urinalysis Basic - ( 2023 16:16 )    Color: Yellow / Appearance: SL Cloudy / S.015 / pH: x  Gluc: x / Ketone: NEGATIVE  / Bili: Negative / Urobili: 0.2 E.U./dL   Blood: x / Protein: 30 mg/dL / Nitrite: NEGATIVE   Leuk Esterase: Trace / RBC: < 5 /HPF / WBC < 5 /HPF   Sq Epi: x / Non Sq Epi: 0-5 /HPF / Bacteria: Many /HPF      CAPILLARY BLOOD GLUCOSE      POCT Blood Glucose.: 159 mg/dL (2023 17:02)      RADIOLOGY & ADDITIONAL TESTS: Reviewed. HOSPITALIST COMGMT PROGRESS NOTE    Interval events: None  Subjective: improved pain control today    Remaining ROS negative       PHYSICAL EXAM:    PHYSICAL EXAM:    General: no acute distress, resting in bed  HEENT: NC/AT  Neuro: no visual deficits, PERRLA  Lungs: breathing is nonlabored  Heart: not tachycardic today, s1s2  Abdomen: soft, ostomy in place  Extremities: left leg s/p aka with dressing in place    VITAL SIGNS:  Vital Signs Last 24 Hrs  T(C): 37.7 (2023 09:00), Max: 38.3 (2023 15:26)  T(F): 99.8 (2023 09:00), Max: 101 (2023 15:26)  HR: 98 (2023 12:00) (90 - 120)  BP: 132/58 (2023 12:00) (100/55 - 132/58)  BP(mean): 84 (2023 12:00) (69 - 84)  RR: 18 (2023 12:00) (15 - 18)  SpO2: 100% (2023 12:00) (95% - 100%)    Parameters below as of 2023 12:00  Patient On (Oxygen Delivery Method): room air          MEDICATIONS:  MEDICATIONS  (STANDING):  aMIOdarone    Tablet 100 milliGRAM(s) Oral every 24 hours  ascorbic acid 500 milliGRAM(s) Oral daily  chlorhexidine 2% Cloths 1 Application(s) Topical <User Schedule>  chlorhexidine 2% Cloths 1 Application(s) Topical <User Schedule>  dextrose 5%. 1000 milliLiter(s) (50 mL/Hr) IV Continuous <Continuous>  dextrose 5%. 1000 milliLiter(s) (100 mL/Hr) IV Continuous <Continuous>  diphenoxylate/atropine 2 Tablet(s) Oral every 8 hours  enoxaparin Injectable 60 milliGRAM(s) SubCutaneous every 12 hours  fat emulsion (Fish Oil and Plant Based) 20% Infusion 0.7764 Gm/kG/Day (20.83 mL/Hr) IV Continuous <Continuous>  glucagon  Injectable 1 milliGRAM(s) IntraMuscular once  influenza  Vaccine (HIGH DOSE) 0.7 milliLiter(s) IntraMuscular once  insulin lispro (ADMELOG) corrective regimen sliding scale   SubCutaneous every 6 hours  loperamide 4 milliGRAM(s) Oral every 8 hours  metoprolol succinate ER 25 milliGRAM(s) Oral daily  mirtazapine 30 milliGRAM(s) Oral at bedtime  multivitamin 1 Tablet(s) Oral daily  opium Tincture 6 milliGRAM(s) Oral every 12 hours  pantoprazole    Tablet 40 milliGRAM(s) Oral two times a day  Parenteral Nutrition - Adult 1 Each (83 mL/Hr) TPN Continuous <Continuous>  Parenteral Nutrition - Adult 1 Each (83 mL/Hr) TPN Continuous <Continuous>  piperacillin/tazobactam IVPB.. 3.375 Gram(s) IV Intermittent every 8 hours  psyllium Powder 1 Packet(s) Oral every 8 hours  sacubitril 24 mG/valsartan 26 mG 1 Tablet(s) Oral two times a day  spironolactone 25 milliGRAM(s) Oral daily  zinc sulfate 220 milliGRAM(s) Oral daily    MEDICATIONS  (PRN):  acetaminophen     Tablet .. 650 milliGRAM(s) Oral every 6 hours PRN Temp greater or equal to 38C (100.4F), Mild Pain (1 - 3)  dextrose Oral Gel 15 Gram(s) Oral once PRN Blood Glucose LESS THAN 70 milliGRAM(s)/deciliter  melatonin 3 milliGRAM(s) Oral at bedtime PRN Insomnia  ondansetron Injectable 4 milliGRAM(s) IV Push every 6 hours PRN Nausea and/or Vomiting  oxyCODONE    IR 5 milliGRAM(s) Oral every 6 hours PRN Severe Pain (7 - 10)  sodium chloride 0.9% lock flush 10 milliLiter(s) IV Push every 1 hour PRN Pre/post blood products, medications, blood draw, and to maintain line patency      ALLERGIES:  Allergies    No Known Allergies    Intolerances        LABS:                        7.4    11.86 )-----------( 577      ( 2023 05:30 )             22.8     02-07    136  |  101  |  26<H>  ----------------------------<  128<H>  3.8   |  25  |  0.69    Ca    8.3<L>      2023 05:30  Phos  2.6     02-07  Mg     1.9     02-07    TPro  6.7  /  Alb  2.1<L>  /  TBili  0.7  /  DBili  x   /  AST  20  /  ALT  27  /  AlkPhos  154<H>        Urinalysis Basic - ( 2023 16:16 )    Color: Yellow / Appearance: SL Cloudy / S.015 / pH: x  Gluc: x / Ketone: NEGATIVE  / Bili: Negative / Urobili: 0.2 E.U./dL   Blood: x / Protein: 30 mg/dL / Nitrite: NEGATIVE   Leuk Esterase: Trace / RBC: < 5 /HPF / WBC < 5 /HPF   Sq Epi: x / Non Sq Epi: 0-5 /HPF / Bacteria: Many /HPF      CAPILLARY BLOOD GLUCOSE      POCT Blood Glucose.: 159 mg/dL (2023 17:02)      RADIOLOGY & ADDITIONAL TESTS: Reviewed.

## 2023-02-20 NOTE — PROCEDURAL SAFETY CHECKLIST WITH OR WITHOUT SEDATION - NSPRESURGSED_GEN_ALL_CORE
n/a
ROSAMARIA Foley/Present, accurate, and signed
Present, accurate, and signed
Present, accurate, and signed
verbally consented per ROSAMARIA Og/n/a

## 2023-02-20 NOTE — PROGRESS NOTE ADULT - SUBJECTIVE AND OBJECTIVE BOX
SUBJECTIVE: Patient seen and examined bedside by chief resident. No acute complaints this morning. Denies F/C/CP/SOB/N/V      aMIOdarone    Tablet 100 milliGRAM(s) Oral every 24 hours  DAPTOmycin IVPB 500 milliGRAM(s) IV Intermittent every 24 hours  enoxaparin Injectable 65 milliGRAM(s) SubCutaneous every 12 hours  ertapenem  IVPB 1000 milliGRAM(s) IV Intermittent every 24 hours  metoprolol succinate ER 25 milliGRAM(s) Oral daily      Vital Signs Last 24 Hrs  T(C): 36.9 (20 Feb 2023 05:20), Max: 37.3 (19 Feb 2023 17:09)  T(F): 98.5 (20 Feb 2023 05:20), Max: 99.1 (19 Feb 2023 17:09)  HR: 90 (20 Feb 2023 04:00) (82 - 104)  BP: 102/55 (20 Feb 2023 04:00) (101/60 - 164/76)  BP(mean): 71 (20 Feb 2023 04:00) (71 - 101)  RR: 17 (20 Feb 2023 04:00) (16 - 18)  SpO2: 100% (20 Feb 2023 01:00) (99% - 100%)    Parameters below as of 20 Feb 2023 04:00  Patient On (Oxygen Delivery Method): room air      I&O's Detail    19 Feb 2023 07:01  -  20 Feb 2023 07:00  --------------------------------------------------------  IN:    dextrose 5% + lactated ringers: 1700 mL  Total IN: 1700 mL    OUT:    Ileostomy (mL): 2500 mL    Voided (mL): 1400 mL  Total OUT: 3900 mL    Total NET: -2200 mL        PHYSICAL EXAM:  General: NAD, noted to be in mild discomfort due to leg pain  HEENT: mucous membranes moist  Pulmonary: B/l breath sounds, nonlabored breathing, no r/r/w  Cardiovascular: S1 s2, NSR, no m/r/g  Abdominal: Soft, NTND, ostomy with yellow liquid stool in bag  Extremities: Left AKA stump dressed with kerlix/ACE bandage, saturated with serosanguinous fluid      LABS:                        7.6    9.83  )-----------( 487      ( 20 Feb 2023 05:56 )             24.5     02-20    139  |  108  |  5<L>  ----------------------------<  89  3.8   |  24  |  0.67    Ca    8.2<L>      20 Feb 2023 05:56  Phos  2.6     02-20  Mg     1.4     02-20    TPro  5.7<L>  /  Alb  2.0<L>  /  TBili  0.3  /  DBili  <0.2  /  AST  29  /  ALT  20  /  AlkPhos  140<H>  02-19          RADIOLOGY & ADDITIONAL STUDIES:

## 2023-02-20 NOTE — CHART NOTE - NSCHARTNOTESELECT_GEN_ALL_CORE
[facility]    Discharge Summary    Gage Davis Patient Status:  [place]    2017 MRN [id number]   [facility] 2SW-N Attending Chris Sanchez, [address] [facility] [address] PCP Chris Sanchez DO     Discharge Date/Time: 2017 at 13:30    Or Follow Up Nutrition Assessment/Nutrition Services

## 2023-02-20 NOTE — PROCEDURE NOTE - NSPOSTCAREGUIDE_GEN_A_CORE
Verbal/written post procedure instructions were given to patient/caregiver
Verbal/written post procedure instructions were given to patient/caregiver/Care for catheter as per unit/ICU protocols
Verbal/written post procedure instructions were given to patient/caregiver/Instructed patient/caregiver to follow-up with primary care physician/Instructed patient/caregiver regarding signs and symptoms of infection/Keep the cast/splint/dressing clean and dry/Care for catheter as per unit/ICU protocols
Care for catheter as per unit/ICU protocols
Verbal/written post procedure instructions were given to patient/caregiver/Instructed patient/caregiver regarding signs and symptoms of infection/Keep the cast/splint/dressing clean and dry/Care for catheter as per unit/ICU protocols

## 2023-02-20 NOTE — PROGRESS NOTE ADULT - ATTENDING COMMENTS
s/p LLE gregor MARINO - will discuss with Vascular timing for formalization  No longer having fevers, likely was due to gangrenous LLE, will plan for PICC placement and initiation of TPN  Continue diet and nutritional supplementation in anticipation of eventual ostomy closure

## 2023-02-20 NOTE — PROCEDURE NOTE - NSSITEPREP_SKIN_A_CORE
chlorhexidine
chlorhexidine
chlorhexidine/Adherence to aseptic technique: hand hygiene prior to donning barriers (gown, gloves), don cap and mask, sterile drape over patient
alcohol/chlorhexidine/Adherence to aseptic technique: hand hygiene prior to donning barriers (gown, gloves), don cap and mask, sterile drape over patient
chlorhexidine
chlorhexidine/Adherence to aseptic technique: hand hygiene prior to donning barriers (gown, gloves), don cap and mask, sterile drape over patient
chlorhexidine
chlorhexidine

## 2023-02-20 NOTE — PROGRESS NOTE ADULT - ASSESSMENT
77M first presented to [facility] from [facility] due to unresponsiveness (responded to Narcan). At [facility], found to have subsegmental pulmonary embolism in RUL, rapid atrial flutter with severely reduced LVEF with LV thrombus. Transferred to [facility] on 12/7/22 for LORENZO and possible ablation. At [facility], was found to have left leg ischemia (left leg arterial thrombus on LE duplex on 12/8). Was being considered for rhythm control when he developed abdominal pain, CTA (12/10/22) showed embolus in SMA, bowel infarct, requiring ex-lap on 12/11 with SMA embolectomy, 80cm small bowel resection; On 12/13, underwent 2nd look laparotomy, with 80cm small bowel resection, closure with abthera. On 12/15, underwent 3rd look laparotomy, end-to-end anastomosis of remaining bowel, loop ileostomy and abdominal closure. Eventually underwent LORENZO/DCCV on 12/30/22, now in sinus rhythm. More recently found to have k. 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     #bacteremia - k pneumoniae  -cultures cleared, on erta/dapto  -management per ID  -abscess not drainable per IR    #s/p left AKA  management per primary team/ vascular  RTOR next week for closure    #anemia  hg improved to 8.1 today    #scleral icterus  -please add on CMP today with direct and indirect bili    #Suicidal Ideation  #Insomnia   being seen by psych, currently without SI  - Continuing mirtazapine to 30mg QHS for ongoing depressive sx and insomnia    # Acute Systolic Heart Failure   - Metoprolol succinate 25mg qd - continue    # Atrial Flutter  - s/p DCCV 12/30  - EP recs - uninterrupted AC x 30 days ; January 29th was 30 days post DCCV.   -Continue Lovenox, amiodarone and metoprolol succinate    # Pulmonary embolism (subsegmental RUL)   - After completion of AC for DCCV, would need to continue AC for PE - continuing lovenox at this time    # Bowel ischemia - s/p SMA embolectomy; Small bowel resection  - ileostomy in place  - plan of primary team  - loperamide and diphenoxylate atropine    # Severe protein-calorie malnutrition  # Sacral wound   - needs nutritional optimization  - Wound care per nursing protocol     #DVT ppx -  lovenox for PE      77M first presented to [facility] from [facility] due to unresponsiveness (responded to Narcan). At [facility], found to have subsegmental pulmonary embolism in RUL, rapid atrial flutter with severely reduced LVEF with LV thrombus. Transferred to [facility] on 12/7/22 for LORENZO and possible ablation. At [facility], was found to have left leg ischemia (left leg arterial thrombus on LE duplex on 12/8). Was being considered for rhythm control when he developed abdominal pain, CTA (12/10/22) showed embolus in SMA, bowel infarct, requiring ex-lap on 12/11 with SMA embolectomy, 80cm small bowel resection; On 12/13, underwent 2nd look laparotomy, with 80cm small bowel resection, closure with abthera. On 12/15, underwent 3rd look laparotomy, end-to-end anastomosis of remaining bowel, loop ileostomy and abdominal closure. Eventually underwent LORENZO/DCCV on 12/30/22, now in sinus rhythm. More recently found to have k. 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     #bacteremia - k pneumoniae  -cultures cleared, on erta/dapto  -management per ID  -abscess not drainable per IR  -PICC placed today    #s/p left AKA  management per primary team/ vascular  RTOR this week for closure    #anemia  hg 7.6 today  no e/o active bleeding    #Suicidal Ideation  #Insomnia   being seen by psych, currently without SI  - Continue mirtazapine 30mg QHS for ongoing depressive sx and insomnia    # Acute Systolic Heart Failure   - Metoprolol succinate 25mg qd - continue    # Atrial Flutter  - s/p DCCV 12/30  - EP recs - uninterrupted AC x 30 days ; January 29th was 30 days post DCCV.   -Continue Lovenox, amiodarone and metoprolol succinate    # Pulmonary embolism (subsegmental RUL)   - After completion of AC for DCCV, would need to continue AC for PE - continuing lovenox at this time    # Bowel ischemia - s/p SMA embolectomy; Small bowel resection  - ileostomy in place  - plan of primary team  - loperamide and diphenoxylate atropine    # Severe protein-calorie malnutrition  # Sacral wound   - needs nutritional optimization  - Wound care per nursing protocol     #DVT ppx -  lovenox for PE

## 2023-02-20 NOTE — PROGRESS NOTE ADULT - ASSESSMENT
77M with PMHx of IVDU found unresponsive at his nursing home, resolved after Narcan in the field and brought to [facility]. Found to have PE, atrial flutter, and large LV thrombus on echo and CTA showing mid SMA with embolus s/p Ex lap, SMA embolectomy, 80cm SBR, abthera vac left in discontinuity (12/11) and transferred to SICU intubated. S/p second look (12/13) and most recently s/p OR for 3rd look, end-to-end anastomosis of remaining bowel, loop ileostomy and abdomen closure (12/15). Now stepped down to telemtry. LLE ischemia, AKA delayed by nutritional optimization. Transferred to SICU in the setting of sepsis for HD monitoring. 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

## 2023-02-20 NOTE — CHART NOTE - NSCHARTNOTEFT_GEN_A_CORE
Admitting Diagnosis:   Patient is a 78y old  Male who presents with a chief complaint of A flutter (2023 08:26)    PAST MEDICAL & SURGICAL HISTORY:  PMHx of IVDU    Current Nutrition Order:  Regular diet with Ensure Max TID (450 kcal, 90g pro)  Supplemental TPN via central line:  275g Dex, 90g AA, 50g 20% SMOF Lipids to provide in total 1795 Kcal, 90g protein, GIR 2.98, 1.4g/kg ABW protein    PO Intake: Good (%) [   ]  Fair (50-75%) [ x ] Poor (<25%) [   ]- appetite improving per disc with team    GI Issues:   WDL, last BM   No n/v/d/c noted  Ileostomy (2500 ml/24hrs)  No abdominal discomfort or distention noted    Pain:   No pain noted at this time    Skin Integrity:  Surgical incision noted, Buddy: 14  New left AKA as of 2/15  No edema noted  pressure ulcer: sacral spine stage IV pressure ulcer (?change in stging), unstageable upper back pressure ulcer    Labs:       139  |  108  |  5<L>  ----------------------------<  89  3.8   |  24  |  0.67    Ca    8.2<L>      2023 05:56  Phos  2.6       Mg     1.4         TPro  5.7<L>  /  Alb  2.0<L>  /  TBili  0.3  /  DBili  <0.2  /  AST  29  /  ALT  20  /  AlkPhos  140<H>      Medications:  MEDICATIONS  (STANDING):  aMIOdarone    Tablet 100 milliGRAM(s) Oral every 24 hours  ascorbic acid 500 milliGRAM(s) Oral daily  cholestyramine Powder (Sugar-Free) 2 Gram(s) Oral three times a day  DAPTOmycin IVPB 500 milliGRAM(s) IV Intermittent every 24 hours  dextrose 5% + lactated ringers. 1000 milliLiter(s) (100 mL/Hr) IV Continuous <Continuous>  diphenoxylate/atropine 2 Tablet(s) Oral every 8 hours  enoxaparin Injectable 65 milliGRAM(s) SubCutaneous every 12 hours  ertapenem  IVPB 1000 milliGRAM(s) IV Intermittent every 24 hours  fat emulsion (Fish Oil and Plant Based) 20% Infusion 0.777 Gm/kG/Day (20.9 mL/Hr) IV Continuous <Continuous>  influenza  Vaccine (HIGH DOSE) 0.7 milliLiter(s) IntraMuscular once  loperamide 4 milliGRAM(s) Oral every 8 hours  magnesium sulfate  IVPB 2 Gram(s) IV Intermittent every 1 hour  metoprolol succinate ER 25 milliGRAM(s) Oral daily  mirtazapine 30 milliGRAM(s) Oral at bedtime  multivitamin 1 Tablet(s) Oral daily  opium Tincture 6 milliGRAM(s) Oral every 12 hours  pantoprazole    Tablet 40 milliGRAM(s) Oral two times a day  Parenteral Nutrition - Adult 1 Each (83 mL/Hr) TPN Continuous <Continuous>  potassium chloride  10 mEq/100 mL IVPB 10 milliEquivalent(s) IV Intermittent once  povidone iodine 10% Solution 1 Application(s) Topical daily  psyllium Powder 1 Packet(s) Oral every 8 hours  zinc sulfate 220 milliGRAM(s) Oral daily    MEDICATIONS  (PRN):  acetaminophen     Tablet .. 650 milliGRAM(s) Oral every 6 hours PRN Temp greater or equal to 38C (100.4F), Mild Pain (1 - 3)  melatonin 3 milliGRAM(s) Oral at bedtime PRN Insomnia  ondansetron Injectable 4 milliGRAM(s) IV Push every 6 hours PRN Nausea and/or Vomiting  oxyCODONE    IR 5 milliGRAM(s) Oral every 4 hours PRN Severe Pain (7 - 10)  sodium chloride 0.9% lock flush 10 milliLiter(s) IV Push every 1 hour PRN Pre/post blood products, medications, blood draw, and to maintain line patency    Admitting Anthropometrics:    pounds +-10%  Wt 142 pounds BMI 16.4 %IBW=66%   New adjusted IBW (w/ L AKA): 178lbs/ 81.3kg     Weight Change:   2/15 141.7 pounds   141.9 pounds - dosing wt    136 pounds - Bedscale wt   2/15 142lbs    **PCM:  >> Reports having 1-2 meals/day + ONS. Per pt claims to have 2 ensure/day and 2 nutrament/day - unclear how accurate this is. ?If pt meeting ~75% EER consistently.  >> Does report wt loss.  pounds x6 months ago. Thinks he now is 135 pounds which is consistent with bedscale wt obtained during initial visit by  pounds. Suggest wt loss of 49 pounds/26% body wt loss.  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     Estimated energy needs:  ABW used for calculations as pt between % of IBW (80%) Adjust for age, malnutrition, EF, S/p OR, Pressure ulcer; fluids per team or as recommended below  30-35kcal/k-2275kcal/day   1.4-1.6gm/k-104gm prot/day   30-35kcal/k-2275kcal/day   **Rec upper end of needs     Subjective:  77M with PMHx of IVDU found unresponsive at his nursing home, resolved after Narcan in the field and brought to [facility]. Found to have PE, atrial flutter, and large LV thrombus on echo. Transferred to [facility] for further management. Pt c/o abdominal pain on 12/10 CTA showing mid SMA with embolus. Abnormal distal small bowel loops and cecum with dilatation and pneumatosis suggesting infarcted bowel. One or two tiny foci of intrahepatic portal vein pneumatosis. Segmental infarction upper pole left kidney. Now s/p Ex lap, SMA embolectomy, 80cm SBR, abthera vac left in discontinuity () and transferred to SICU intubated. S/p second look () and most recently s/p OR for 3rd look, end-to-end anastomosis of remaining bowel, loop ileostomy and abdomen closure (12/15). Transferred to CCU on  for cardiac optimization and now transferred back to SICU  for bleeding hemodynamic instability. Echo  shows EF 10-15% with overall hypokinesis. CTA performed demonstrating large hematoma in R abdomen, new hemoperitoneum, and bilateral pleural effusions. Remains in SICU, now extubated with still with limb ischemia to LLE pending amputation and AC held given BRBPR and hematoma; noted pt agreeable for AKA when feasible - AKA currently Pending Cards. Pt s/p DCCV on  (negative LORENZO on ) with successful conversion to NSR. Planning for AKA with vascular surgery once nutrition status is optimize. Team placed PICC 1/10 and initiated supplemental TPN now weaned off with constant encouragement of PO intake. CT A/P  showing RLQ fluid collection. PPN held / bacteremia. More recently found to have k. 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 PICC placed  now plan to restart TPN.     On assessment, pt resting in bed. Currently on regular diet with Ensure Max TID (450 kcal, 90g pro) with plan to start supplemental TPN to meet majority of est needs. Pt has a hx of poor PO intake due to limited food preferences. Per disc with team, pt has had a increase in appetite since sx, consuming 25-50% of most meals (mainly oatmeal, yogurt, soups). Would recommend initiation of calorie count to optimize remaining nutrition with TPN. No noted n/v/d/c. No abd distention or discomfort. Ostomy in place (2500 ml/24hrs)- still remain concern for malabsorption of all PO intake. RD cont to encourage adequate PO intake as tolerated. RD to follow.     Prior PES: Malnutrition Severe in Chronic state RT presumed intake<EER AEB NFPE, wt loss, PO intake  >>on going  Goal: Pt will meet at least 75% of nutrient needs via feasible route / Show no further s/s of malnutrition    Recommendations:  1. Cont with Regular diet with Ensure Max TID (450 kcal, 90g pro)  2. Due to persistent malabsorption, recommend resuming TPN as medically feasible/appropriate. Recommend; 275g Dex, 90g AA, 50g SMOF lipids to provide 1795 kcal, 90g pro, GIR 2.93, 1.38 g/kg pro ABW. RD to follow.  >> Cont to trend TG weekly  3. Recommend continue MVI daily  4. Monitor Skin, Wts daily, GOC. Pain/GI per team.   >>Cont with opium tincture, imodium and metamucil per team  5. Monitor lytes and replete prn. POC BG q6hrs     Education:  RD cont to encourage adequate PO intake as tolerated.     Risk Level: High [ X ] Moderate [   ] Low [   ].

## 2023-02-20 NOTE — PROCEDURE NOTE - NSPROCDETAILS_GEN_ALL_CORE
location identified, draped/prepped, sterile technique used/sterile dressing applied/sterile technique, catheter placed/supine position/ultrasound guidance
location identified, draped/prepped, sterile technique used/blood seen on insertion/dressing applied/flushes easily/secured in place/sterile technique, catheter placed
location identified, draped/prepped, sterile technique used, needle inserted/introduced/positive blood return obtained via catheter/connected to a pressurized flush line/hemostasis with direct pressure, dressing applied/Seldinger technique/all materials/supplies accounted for at end of procedure
nasogastric
location identified, draped/prepped, sterile technique used, needle inserted/introduced/positive blood return obtained via catheter/connected to a pressurized flush line/hemostasis with direct pressure, dressing applied/Seldinger technique/all materials/supplies accounted for at end of procedure
location identified, draped/prepped, sterile technique used/sterile dressing applied/sterile technique, catheter placed/supine position
guidewire recovered/lumen(s) aspirated and flushed/sterile dressing applied/sterile technique, catheter placed/ultrasound guidance with use of sterile gel and probe cove
guidewire recovered/lumen(s) aspirated and flushed/sterile dressing applied/sterile technique, catheter placed/ultrasound guidance with use of sterile gel and probe cove

## 2023-02-21 NOTE — PROGRESS NOTE ADULT - SUBJECTIVE AND OBJECTIVE BOX
SUBJECTIVE: Patient seen and examined bedside by chief resident. No acute complaints this morning. Denies F/C/CP/SOB/N/V    aMIOdarone    Tablet 100 milliGRAM(s) Oral every 24 hours  DAPTOmycin IVPB 500 milliGRAM(s) IV Intermittent every 24 hours  enoxaparin Injectable 65 milliGRAM(s) SubCutaneous every 12 hours  ertapenem  IVPB 1000 milliGRAM(s) IV Intermittent every 24 hours  metoprolol succinate ER 25 milliGRAM(s) Oral daily      Vital Signs Last 24 Hrs  T(C): 38.3 (21 Feb 2023 05:15), Max: 38.3 (21 Feb 2023 05:11)  T(F): 100.9 (21 Feb 2023 05:15), Max: 101 (21 Feb 2023 05:11)  HR: 102 (21 Feb 2023 05:15) (92 - 102)  BP: 145/65 (21 Feb 2023 05:15) (116/56 - 145/65)  BP(mean): 94 (21 Feb 2023 05:15) (78 - 94)  RR: 15 (21 Feb 2023 05:15) (15 - 18)  SpO2: 100% (21 Feb 2023 05:15) (100% - 100%)    Parameters below as of 21 Feb 2023 05:15  Patient On (Oxygen Delivery Method): room air      I&O's Detail    20 Feb 2023 07:01  -  21 Feb 2023 07:00  --------------------------------------------------------  IN:    dextrose 5% + lactated ringers: 2200 mL    Fat Emulsion (Fish Oil &amp; Plant Based) 20% Infusion: 304.8 mL    IV PiggyBack: 50 mL    IV PiggyBack: 50 mL    IV PiggyBack: 100 mL    Lactated Ringers Bolus: 2000 mL    Oral Fluid: 640 mL    TPN (Total Parenteral Nutrition): 1245 mL  Total IN: 6589.8 mL    OUT:    Ileostomy (mL): 4050 mL    Voided (mL): 1950 mL  Total OUT: 6000 mL    Total NET: 589.8 mL      21 Feb 2023 07:01  -  21 Feb 2023 08:32  --------------------------------------------------------  IN:    dextrose 5% + lactated ringers: 100 mL    Fat Emulsion (Fish Oil &amp; Plant Based) 20% Infusion: 20.8 mL    TPN (Total Parenteral Nutrition): 83 mL  Total IN: 203.8 mL    OUT:  Total OUT: 0 mL    Total NET: 203.8 mL          General: NAD, resting comfortably in bed  C/V: NSR  Pulm: Nonlabored breathing, no respiratory distress  Abd: soft, NT/ND, ostomy with stool and gas in bag.   Extrem: WWP, no edema, SCDs in place        LABS:                        7.6    9.83  )-----------( 487      ( 20 Feb 2023 05:56 )             24.5     02-20    139  |  108  |  5<L>  ----------------------------<  89  3.8   |  24  |  0.67    Ca    8.2<L>      20 Feb 2023 05:56  Phos  2.6     02-20  Mg     1.4     02-20    TPro  5.7<L>  /  Alb  2.0<L>  /  TBili  0.3  /  DBili  <0.2  /  AST  29  /  ALT  20  /  AlkPhos  140<H>  02-19          RADIOLOGY & ADDITIONAL STUDIES:

## 2023-02-21 NOTE — CHART NOTE - NSCHARTNOTEFT_GEN_A_CORE
C/L psychology intern met with pt for individual psychotherapy session. Pt was alert, reported feeling "normal I guess, for being in the hospital." He ate his lunch during this visit, ate entirety of his lunch. Session focused on pt's progress throughout psychotherapy treatment while in hospital. Writer assessed for SI, pt endorsed having had passive SI previously in his hospitalization, but not currently endorsing SI. Session ended with pt in stable mood and good behavioral control.    **Of note for medicine, and communicated by writer to nurse: Pt c/o feeling cold, appeared to be shivering. Pt's face appears puffy, especially right side.

## 2023-02-21 NOTE — PROGRESS NOTE ADULT - ASSESSMENT
A/P: 78yMale to OR   -NPO for OR, IVF  -Operative consent   -2U PRBCs on hold if necessary   -Pain/nausea control  -SQH, SCDs, OOB/A   A/P: 77M with PMHx of IVDU found unresponsive at his nursing home, resolved after Narcan in the field and brought to [facility]. Found to have PE, atrial flutter, and large LV thrombus on echo. Transferred to [facility] for further management. Pt c/o abdominal pain on 12/10 CTA showing mid SMA with embolus. Abnormal distal small bowel loops and cecum with dilatation and pneumatosis suggesting infarcted bowel. One or two tiny foci of intrahepatic portal vein pneumatosis. Segmental infarction upper pole left kidney. Now s/p Ex lap, SMA embolectomy, 80cm SBR, abthera vac left in discontinuity (12/11) and transferred to SICU intubated. S/p second look (12/13) and most recently s/p OR for 3rd look, end-to-end anastomosis of remaining bowel, loop ileostomy and abdomen closure (12/15). S/p LORENZO and Cardioversion on 12/30 with cardiology. LLE with AKA delayed by nutritional optimization. s/p L AKA gregor 2/15. Now planned for RTOR for formal closure of LAKA    PLAN  -NPO for OR, IVF  -Operative consent in chart  -2U PRBCs on hold if necessary   -Pain/nausea control  -SQH, SCDs, OOB/A

## 2023-02-21 NOTE — PROGRESS NOTE ADULT - ASSESSMENT
78M with PMHx of IVDU found unresponsive at his nursing home, resolved after Narcan in the field and brought to [facility]. Found to have PE, atrial flutter, and large LV thrombus on ECHOand CTA showing mid SMA with embolus s/p Ex lap, SMA embolectomy, 80cm SBR, abthera vac left in discontinuity (12/11) and transferred to SICU intubated. S/p second look (12/13) and most recently s/p OR for 3rd look, end-to-end anastomosis of remaining bowel, loop ileostomy and abdomen closure (12/15). Prev stepped down to telemetry. LLE ischemia, AKA delayed by nutritional optimization. Transferred to SICU in the setting of sepsis for HD monitoring. 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    - Maintain surgical dressing, changed today, can reinforce as needed  - Plan for RTOR for formal closure of L AKA tomorrow, please pre-op  - Remainder of care per primary team  - Vascular will continue to follow

## 2023-02-21 NOTE — PROGRESS NOTE ADULT - SUBJECTIVE AND OBJECTIVE BOX
VASCULAR SURGERY PRE-OP NOTE    Pre-op diagnosis: Disorder of Circulatory System  Planned procedure:  Left above knee amputation closure  Surgeon:  Dr. Greenberg/ Dr. Dean     HPI:  78M with PMHx of IVDU found unresponsive at his nursing home, resolved after Narcan in the field and brought to [facility]. Found to have PE, atrial flutter, and large LV thrombus on ECHO and CTA showing mid SMA with embolus s/p Ex lap, SMA embolectomy, 80cm SBR, abthera vac left in discontinuity (12/11) and transferred to SICU intubated. S/p second look (12/13) and most recently s/p OR for 3rd look, end-to-end anastomosis of remaining bowel, loop ileostomy and abdomen closure (12/15). Prev stepped down to telemetry. LLE ischemia, AKA delayed by nutritional optimization. Transferred to SICU in the setting of sepsis for HD monitoring. 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      Labs:                         8.1    12.72 )-----------( 417      ( 21 Feb 2023 11:15 )             25.9     02-21    140  |  107  |  5<L>  ----------------------------<  109<H>  3.4<L>   |  24  |  0.49<L>    Ca    8.1<L>      21 Feb 2023 11:15  Phos  2.3     02-21  Mg     1.7     02-21        PT/INR - ( 21 Feb 2023 11:15 )   PT: 14.8 sec;   INR: 1.24          PTT - ( 21 Feb 2023 11:15 )  PTT:40.9 sec    Urinalysis:       COVID status/date:   [ ]Negative/Date: 7/25/22 [ ]Positive/Date:     *With most recent within 48hrs of OR    Is patient on ACE/ARB?   [ ]No [ ]Yes   *If yes, please hold any ACE/ARB the day of surgery    Is patient on Lantus at bedtime?   [ ]No [ ]Yes   *If yes, please half the dose the night before OR since patient will be NPO    Does patient have a contrast allergy?   [ ]No [ ]Yes  *If yes, please pre-medicate per protocol    Is patient on anticoagulation?   [ ]No [ ] Yes  *If yes, please discuss with team when to hold it    Is the patient Female and <54yo   [ ]No [ ] Yes  If yes, pregnancy test must be documented in the chart    Is patient on dialysis? [ ]No [ ]Yes  *If yes, please obtain all labs including K level EARLY the day of surgery   *Also, will NOT require IVF past midnight    T&S x 2:     EKG:     CXR:      VASCULAR SURGERY PRE-OP NOTE    Pre-op diagnosis: Disorder of Circulatory System  Planned procedure:  Left above knee amputation closure  Surgeon:  Dr. Greenberg/ Dr. Dean     HPI:  78M with PMHx of IVDU found unresponsive at his nursing home, resolved after Narcan in the field and brought to [facility]. Found to have PE, atrial flutter, and large LV thrombus on ECHO and CTA showing mid SMA with embolus s/p Ex lap, SMA embolectomy, 80cm SBR, abthera vac left in discontinuity () and transferred to SICU intubated. S/p second look () and most recently s/p OR for 3rd look, end-to-end anastomosis of remaining bowel, loop ileostomy and abdomen closure (12/15). Prev stepped down to telemetry. LLE ischemia, AKA delayed by nutritional optimization. Transferred to SICU in the setting of sepsis for HD monitoring. 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      Labs:                         8.1    12.72 )-----------( 417      ( 2023 11:15 )             25.9     02-21    140  |  107  |  5<L>  ----------------------------<  109<H>  3.4<L>   |  24  |  0.49<L>    Ca    8.1<L>      2023 11:15  Phos  2.3     -  Mg     1.7     -        PT/INR - ( 2023 11:15 )   PT: 14.8 sec;   INR: 1.24          PTT - ( 2023 11:15 )  PTT:40.9 sec    Urinalysis:   2023 Negative      COVID status/date:   [X]Negative/Date: 2023  [ ]Positive/Date:     *With most recent within 48hrs of OR    Is patient on ACE/ARB?   [X]No [ ]Yes   *If yes, please hold any ACE/ARB the day of surgery    Is patient on Lantus at bedtime?   [X]No [ ]Yes   *If yes, please half the dose the night before OR since patient will be NPO    Does patient have a contrast allergy?   [X]No [ ]Yes  *If yes, please pre-medicate per protocol    Is patient on anticoagulation?   [X]No [ ] Yes  *If yes, please discuss with team when to hold it    Is the patient Female and <54yo  [X]No [ ] Yes  If yes, pregnancy test must be documented in the chart    Is patient on dialysis? [X]No [ ]Yes  *If yes, please obtain all labs including K level EARLY the day of surgery   *Also, will NOT require IVF past midnight    T&S x 2:   2023 A positive, Antibody Negative  2023 A positive, Antibody Negative    EK/10/3    CXR:   PROCEDURE DATE:  02/10/2023      INTERPRETATION:  TECHNIQUE: Single portable view of the chest.    COMPARISON:  2023    CLINICAL HISTORY: rapid response - fever/rigors/sepsis    FINDINGS:    Single frontal view of the chest demonstrates the lungs to be clear. The   cardiomediastinal silhouette is normal. No acute osseous abnormalities.   Overlying EKG leads and wires are noted    IMPRESSION: No acute cardiopulmonary disease process.    --- End of Report ---

## 2023-02-21 NOTE — PROGRESS NOTE ADULT - ASSESSMENT
77M with PMHx of IVDU found unresponsive at his nursing home, resolved after Narcan in the field and brought to [facility]. Found to have PE, atrial flutter, and large LV thrombus on echo. Transferred to [facility] for further management. Pt c/o abdominal pain on 12/10 CTA showing mid SMA with embolus. Abnormal distal small bowel loops and cecum with dilatation and pneumatosis suggesting infarcted bowel. One or two tiny foci of intrahepatic portal vein pneumatosis. Segmental infarction upper pole left kidney. Now s/p Ex lap, SMA embolectomy, 80cm SBR, abthera vac left in discontinuity (12/11) and transferred to SICU intubated. S/p second look (12/13) and most recently s/p OR for 3rd look, end-to-end anastomosis of remaining bowel, loop ileostomy and abdomen closure (12/15). S/p LORENZO and Cardioversion on 12/30 with cardiology. LLE with AKA delayed by nutritional optimization. s/p L AKA jaironine 2/15. Now on telemetry.      -Tylenol and Oxycodone PRN.   -Regular diet, IVF  -Replete ostomy output prn   -Continue Immodium/Tincture of Opium.  -PPN discontinued in the setting of bacteremia  -LV thrombus w/ LLE ischemia s/p AKA 02/15  -Erta( 2/13-2/24)  (Dapto 2/11-2/24)    -Undrainable abscess in Abd  -Continue metoprolol/amiodarone   -Holding spironolactone, entresto   -SCDs, SQL 65BID.   -f/u PT recs s/p amputation   -Dispo: ROWAN

## 2023-02-22 NOTE — CHART NOTE - NSCHARTNOTEFT_GEN_A_CORE
Admitting Diagnosis:   Patient is a 78y old  Male who presents with a chief complaint of A flutter (2023 07:27)    PAST MEDICAL & SURGICAL HISTORY:    Current Nutrition Order:   Diet, NPO after Midnight:      NPO Start Date: 2023,   NPO Start Time: 23:59 (23 @ 18:21)    TPN via central line:  275g Dex, 90g AA, 50g 20% SMOF Lipids to provide in total 1795 Kcal, 90g protein, GIR 2.98, 1.4g/kg ABW protein    PO Intake: Good (%) [   ]  Fair (50-75%) [   ] Poor (<25%) [   ]- N/A    GI Issues:   WDL, last BM   No n/v/d/c noted  Ileostomy (2050 ml/24hrs)  No abdominal discomfort or distention noted    Pain:   No pain noted at this time    Skin Integrity:  Surgical incision noted, Buddy: 16  New left AKA as of 2/15  No edema noted  pressure ulcer: sacral spine stage II pressure ulcer, unstageable upper back pressure ulcer    Labs:       138  |  106  |  7   ----------------------------<  94  3.2<L>   |  26  |  0.52    Ca    8.0<L>      2023 05:30  Phos  1.7       Mg     1.5         Medications:  MEDICATIONS  (STANDING):  aMIOdarone    Tablet 100 milliGRAM(s) Oral every 24 hours  ascorbic acid 500 milliGRAM(s) Oral daily  chlorhexidine 2% Cloths 1 Application(s) Topical daily  cholestyramine Powder (Sugar-Free) 2 Gram(s) Oral three times a day  DAPTOmycin IVPB 500 milliGRAM(s) IV Intermittent every 24 hours  dextrose 5% + lactated ringers. 1000 milliLiter(s) (100 mL/Hr) IV Continuous <Continuous>  enoxaparin Injectable 65 milliGRAM(s) SubCutaneous every 12 hours  ertapenem  IVPB 1000 milliGRAM(s) IV Intermittent every 24 hours  fat emulsion (Fish Oil and Plant Based) 20% Infusion 0.7764 Gm/kG/Day (20.83 mL/Hr) IV Continuous <Continuous>  influenza  Vaccine (HIGH DOSE) 0.7 milliLiter(s) IntraMuscular once  lactated ringers Bolus 200 milliLiter(s) IV Bolus once  loperamide 4 milliGRAM(s) Oral every 8 hours  metoprolol succinate ER 25 milliGRAM(s) Oral daily  mirtazapine 30 milliGRAM(s) Oral at bedtime  multivitamin 1 Tablet(s) Oral daily  opium Tincture 6 milliGRAM(s) Oral every 12 hours  pantoprazole    Tablet 40 milliGRAM(s) Oral two times a day  Parenteral Nutrition - Adult 1 Each (83 mL/Hr) TPN Continuous <Continuous>  Parenteral Nutrition - Adult 1 Each (83 mL/Hr) TPN Continuous <Continuous>  povidone iodine 10% Solution 1 Application(s) Topical daily  psyllium Powder 1 Packet(s) Oral every 8 hours  zinc sulfate 220 milliGRAM(s) Oral daily    MEDICATIONS  (PRN):  acetaminophen     Tablet .. 650 milliGRAM(s) Oral every 6 hours PRN Temp greater or equal to 38C (100.4F), Mild Pain (1 - 3)  melatonin 3 milliGRAM(s) Oral at bedtime PRN Insomnia  ondansetron Injectable 4 milliGRAM(s) IV Push every 6 hours PRN Nausea and/or Vomiting  oxyCODONE    IR 5 milliGRAM(s) Oral every 4 hours PRN Severe Pain (7 - 10)  sodium chloride 0.9% lock flush 10 milliLiter(s) IV Push every 1 hour PRN Pre/post blood products, medications, blood draw, and to maintain line patency    Admitting Anthropometrics:    pounds +-10%  Wt 142 pounds BMI 16.4 %IBW=66%   New adjusted IBW (w/ L AKA): 178lbs/ 81.3kg     Weight Change:   2/15 141.7 pounds   141.9 pounds - dosing wt    136 pounds - Bedscale wt   2/15 142lbs    **PCM:  >> Reports having 1-2 meals/day + ONS. Per pt claims to have 2 ensure/day and 2 nutrament/day - unclear how accurate this is. ?If pt meeting ~75% EER consistently.  >> Does report wt loss.  pounds x6 months ago. Thinks he now is 135 pounds which is consistent with bedscale wt obtained during initial visit by  pounds. Suggest wt loss of 49 pounds/26% body wt loss.  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     Estimated energy needs:  ABW used for calculations as pt between % of IBW (80%) Adjust for age, malnutrition, EF, S/p OR, Pressure ulcer; fluids per team or as recommended below  30-35kcal/k-2275kcal/day   1.4-1.6gm/k-104gm prot/day   30-35kcal/k-2275kcal/day   **Rec upper end of needs     Subjective:  77M with PMHx of IVDU found unresponsive at his nursing home, resolved after Narcan in the field and brought to [facility]. Found to have PE, atrial flutter, and large LV thrombus on echo. Transferred to [facility] for further management. Pt c/o abdominal pain on 12/10 CTA showing mid SMA with embolus. Abnormal distal small bowel loops and cecum with dilatation and pneumatosis suggesting infarcted bowel. One or two tiny foci of intrahepatic portal vein pneumatosis. Segmental infarction upper pole left kidney. Now s/p Ex lap, SMA embolectomy, 80cm SBR, abthera vac left in discontinuity () and transferred to SICU intubated. S/p second look () and most recently s/p OR for 3rd look, end-to-end anastomosis of remaining bowel, loop ileostomy and abdomen closure (12/15). Transferred to CCU on  for cardiac optimization and now transferred back to SICU  for bleeding hemodynamic instability. Echo  shows EF 10-15% with overall hypokinesis. CTA performed demonstrating large hematoma in R abdomen, new hemoperitoneum, and bilateral pleural effusions. Remains in SICU, now extubated with still with limb ischemia to LLE pending amputation and AC held given BRBPR and hematoma; noted pt agreeable for AKA when feasible - AKA currently Pending Cards. Pt s/p DCCV on  (negative LORENZO on ) with successful conversion to NSR. Planning for AKA with vascular surgery once nutrition status is optimize. Team placed PICC 1/10 and initiated supplemental TPN now weaned off with constant encouragement of PO intake. CT A/P  showing RLQ fluid collection. PPN held / bacteremia. More recently found to have k. 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 PICC placed  now plan to restart TPN.     On assessment, pt resting in bed. Currently on regular diet with Ensure Max TID (450 kcal, 90g pro) with TPN running at goal rate meeting 100% of est needs. Pt will cont high output via ostomy (2050 ml/24hrs) with high suspicion of cont malabsorption of PO intake. Cont with TPN to meet 100% of est needs until outpt improve and PO intake cont to improve. Pt still consuming 25-50% of most meals per team. RD to follow.     Prior PES: Malnutrition Severe in Chronic state RT presumed intake<EER AEB NFPE, wt loss, PO intake  >>on going  Goal: Pt will meet at least 75% of nutrient needs via feasible route / Show no further s/s of malnutrition    Recommendations:  1. Cont with Regular diet with Ensure Max TID (450 kcal, 90g pro) when medically feasible  2. Due to persistent malabsorption, recommend resuming TPN as medically feasible/appropriate. Recommend; 275g Dex, 90g AA, 50g SMOF lipids to provide 1795 kcal, 90g pro, GIR 2.93, 1.38 g/kg pro ABW. RD to follow.  >> Cont to trend TG weekly  3. Recommend continue MVI daily  4. Monitor Skin, Wts daily, GOC. Pain/GI per team.   >>Cont with opium tincture, imodium and metamucil per team  5. Monitor lytes and replete prn. POC BG q6hrs     Education:  RD cont to encourage adequate PO intake as tolerated.     Risk Level: High [ X ] Moderate [   ] Low [   ].

## 2023-02-22 NOTE — PROGRESS NOTE ADULT - ASSESSMENT
77M with PMHx of IVDU found unresponsive at his nursing home, resolved after Narcan in the field and brought to [facility]. Found to have PE, atrial flutter, and large LV thrombus on echo. Transferred to [facility] for further management. Pt c/o abdominal pain on 12/10 CTA showing mid SMA with embolus. Abnormal distal small bowel loops and cecum with dilatation and pneumatosis suggesting infarcted bowel. One or two tiny foci of intrahepatic portal vein pneumatosis. Segmental infarction upper pole left kidney. Now s/p Ex lap, SMA embolectomy, 80cm SBR, abthera vac left in discontinuity (12/11) and transferred to SICU intubated. S/p second look (12/13) and most recently s/p OR for 3rd look, end-to-end anastomosis of remaining bowel, loop ileostomy and abdomen closure (12/15). S/p LORENZO and Cardioversion on 12/30 with cardiology. LLE with AKA delayed by nutritional optimization. 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 Now on telemetry.      -Tylenol and Oxycodone PRN.   -Regular diet, IVF  -Replete ostomy output prn   -Continue Immodium/Tincture of Opium.  -LV thrombus w/ LLE ischemia s/p AKA 02/15  -Erta( 2/13-2/24)  (Dapto 2/11-2/24)    -Undrainable abscess in Abd  -Continue metoprolol/amiodarone   -Holding spironolactone, entresto   -SCDs, SQL 65BID.   -f/u PT recs s/p amputation   -Dispo: ROWAN   -repeat labs this AM  -plan for AKA revision today

## 2023-02-22 NOTE — PROGRESS NOTE ADULT - SUBJECTIVE AND OBJECTIVE BOX
INTERVAL HPI/OVERNIGHT EVENTS: bolused 200 @ 6am    STATUS POST:  L guillotine AKA    POST OPERATIVE DAY #: 7    SUBJECTIVE:  pt seen at bedside, feeling okay. no complaints at this time.    MEDICATIONS  (STANDING):  aMIOdarone    Tablet 100 milliGRAM(s) Oral every 24 hours  ascorbic acid 500 milliGRAM(s) Oral daily  chlorhexidine 2% Cloths 1 Application(s) Topical daily  cholestyramine Powder (Sugar-Free) 2 Gram(s) Oral three times a day  DAPTOmycin IVPB 500 milliGRAM(s) IV Intermittent every 24 hours  dextrose 5% + lactated ringers. 1000 milliLiter(s) (100 mL/Hr) IV Continuous <Continuous>  enoxaparin Injectable 65 milliGRAM(s) SubCutaneous every 12 hours  ertapenem  IVPB 1000 milliGRAM(s) IV Intermittent every 24 hours  fat emulsion (Fish Oil and Plant Based) 20% Infusion 0.7764 Gm/kG/Day (20.83 mL/Hr) IV Continuous <Continuous>  influenza  Vaccine (HIGH DOSE) 0.7 milliLiter(s) IntraMuscular once  lactated ringers Bolus 200 milliLiter(s) IV Bolus once  loperamide 4 milliGRAM(s) Oral every 8 hours  metoprolol succinate ER 25 milliGRAM(s) Oral daily  mirtazapine 30 milliGRAM(s) Oral at bedtime  multivitamin 1 Tablet(s) Oral daily  opium Tincture 6 milliGRAM(s) Oral every 12 hours  pantoprazole    Tablet 40 milliGRAM(s) Oral two times a day  Parenteral Nutrition - Adult 1 Each (83 mL/Hr) TPN Continuous <Continuous>  povidone iodine 10% Solution 1 Application(s) Topical daily  psyllium Powder 1 Packet(s) Oral every 8 hours  zinc sulfate 220 milliGRAM(s) Oral daily    MEDICATIONS  (PRN):  acetaminophen     Tablet .. 650 milliGRAM(s) Oral every 6 hours PRN Temp greater or equal to 38C (100.4F), Mild Pain (1 - 3)  melatonin 3 milliGRAM(s) Oral at bedtime PRN Insomnia  ondansetron Injectable 4 milliGRAM(s) IV Push every 6 hours PRN Nausea and/or Vomiting  oxyCODONE    IR 5 milliGRAM(s) Oral every 4 hours PRN Severe Pain (7 - 10)  sodium chloride 0.9% lock flush 10 milliLiter(s) IV Push every 1 hour PRN Pre/post blood products, medications, blood draw, and to maintain line patency      Vital Signs Last 24 Hrs  T(C): 36.9 (22 Feb 2023 05:06), Max: 38.4 (21 Feb 2023 18:35)  T(F): 98.5 (22 Feb 2023 05:06), Max: 101.1 (21 Feb 2023 18:35)  HR: 112 (22 Feb 2023 04:07) (90 - 114)  BP: 123/56 (22 Feb 2023 04:07) (114/56 - 147/75)  BP(mean): 80 (22 Feb 2023 04:07) (76 - 98)  RR: 17 (22 Feb 2023 04:07) (16 - 17)  SpO2: 100% (22 Feb 2023 04:07) (100% - 100%)    Parameters below as of 22 Feb 2023 04:07  Patient On (Oxygen Delivery Method): room air        PHYSICAL EXAM:      Constitutional: A&Ox3    Respiratory: non labored breathing, no respiratory distress    Cardiovascular: NSR, RRR    Gastrointestinal: soft, nontender, nondistended, ostomy in place with fecal output    Extremities: (-) edema                  I&O's Detail    21 Feb 2023 07:01  -  22 Feb 2023 07:00  --------------------------------------------------------  IN:    dextrose 5% + lactated ringers: 2300 mL    Fat Emulsion (Fish Oil &amp; Plant Based) 20% Infusion: 83.2 mL    Oral Fluid: 440 mL    TPN (Total Parenteral Nutrition): 1660 mL  Total IN: 4483.2 mL    OUT:    Emesis (mL): 0 mL    Ileostomy (mL): 2050 mL    Voided (mL): 3600 mL  Total OUT: 5650 mL    Total NET: -1166.8 mL          LABS:                        6.8    13.05 )-----------( 400      ( 22 Feb 2023 05:30 )             21.3     02-22    138  |  106  |  7   ----------------------------<  94  3.2<L>   |  26  |  0.52    Ca    8.0<L>      22 Feb 2023 05:30  Phos  1.7     02-22  Mg     1.5     02-22      PT/INR - ( 22 Feb 2023 05:30 )   PT: 15.6 sec;   INR: 1.31          PTT - ( 22 Feb 2023 05:30 )  PTT:33.7 sec      RADIOLOGY & ADDITIONAL STUDIES:

## 2023-02-22 NOTE — PROGRESS NOTE ADULT - ASSESSMENT
78M with PMHx of IVDU found unresponsive at his nursing home, resolved after Narcan in the field and brought to [facility]. Found to have PE, atrial flutter, and large LV thrombus on ECHOand CTA showing mid SMA with embolus s/p Ex lap, SMA embolectomy, 80cm SBR, abthera vac left in discontinuity (12/11) and transferred to SICU intubated. S/p second look (12/13) and most recently s/p OR for 3rd look, end-to-end anastomosis of remaining bowel, loop ileostomy and abdomen closure (12/15). Prev stepped down to telemetry. LLE ischemia, AKA delayed by nutritional optimization. Transferred to SICU in the setting of sepsis for HD monitoring. Now s/p gregor WILSON AKA for gangrene.    - Maintain surgical dressing, changed today, can reinforce as needed  - Will discuss RTOR plan when medically stable and afebrile  - Remainder of care per primary team  - Vascular will continue to follow

## 2023-02-22 NOTE — PROGRESS NOTE ADULT - ASSESSMENT
77M first presented to [facility] from [facility] due to unresponsiveness (responded to Narcan). At [facility], found to have subsegmental pulmonary embolism in RUL, rapid atrial flutter with severely reduced LVEF with LV thrombus. Transferred to [facility] on 12/7/22 for LORENZO and possible ablation. At [facility], was found to have left leg ischemia (left leg arterial thrombus on LE duplex on 12/8). Was being considered for rhythm control when he developed abdominal pain, CTA (12/10/22) showed embolus in SMA, bowel infarct, requiring ex-lap on 12/11 with SMA embolectomy, 80cm small bowel resection; On 12/13, underwent 2nd look laparotomy, with 80cm small bowel resection, closure with abthera. On 12/15, underwent 3rd look laparotomy, end-to-end anastomosis of remaining bowel, loop ileostomy and abdominal closure. Eventually underwent LORENZO/DCCV on 12/30/22, now in sinus rhythm. More recently found to have k. 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     #bacteremia - k pneumoniae  -cultures cleared, on erta/dapto  -management per ID  -abscess not drainable per IR  -PICC placed today    #s/p left AKA  management per primary team/ vascular    #anemia  hg 6.8  no e/o active bleeding - transfusing and repeating post-transfusion CBC  - he has been intermittently tachycardic for entire hospitalization  - if continues to drop further without response to PRBC, would expand workup to include GI workup  - etiology of anemia likely multifactorial    #Suicidal Ideation  #Insomnia   being seen by psych, appreciate input  - Continue mirtazapine 30mg QHS     # Acute Systolic Heart Failure   - Metoprolol succinate 25mg qd - continue    # Atrial Flutter  - s/p DCCV 12/30  - EP recs - uninterrupted AC x 30 days ; January 29th was 30 days post DCCV.   -Continue Lovenox, amiodarone and metoprolol succinate    # Pulmonary embolism (subsegmental RUL)   - After completion of AC for DCCV, would need to continue AC for PE - currently continuing lovenox    # Bowel ischemia - s/p SMA embolectomy; Small bowel resection  - ileostomy in place  - plan of primary team  - loperamide and diphenoxylate atropine    # Severe protein-calorie malnutrition  # Sacral wound   - appreciate input from nutrition  - Wound care per nursing    #DVT ppx - continue lovenox for PE     >35 minutes spent on this encounter, including face to face with patient, care coordination and documentation.   Plan of care discussed with primary team

## 2023-02-22 NOTE — PROGRESS NOTE ADULT - SUBJECTIVE AND OBJECTIVE BOX
SUBJECTIVE:  Doing well this AM. NAEON. Denies n/v. Endorses f/bm. Denies cp/sob. Pain is well controlled. Tolerating diet.    MEDICATIONS  (STANDING):  aMIOdarone    Tablet 100 milliGRAM(s) Oral every 24 hours  ascorbic acid 500 milliGRAM(s) Oral daily  chlorhexidine 2% Cloths 1 Application(s) Topical daily  cholestyramine Powder (Sugar-Free) 2 Gram(s) Oral three times a day  DAPTOmycin IVPB 500 milliGRAM(s) IV Intermittent every 24 hours  dextrose 5% + lactated ringers. 1000 milliLiter(s) (100 mL/Hr) IV Continuous <Continuous>  diphenoxylate/atropine 2 Tablet(s) Oral every 8 hours  enoxaparin Injectable 65 milliGRAM(s) SubCutaneous every 12 hours  ertapenem  IVPB 1000 milliGRAM(s) IV Intermittent every 24 hours  fat emulsion (Fish Oil and Plant Based) 20% Infusion 0.7764 Gm/kG/Day (20.83 mL/Hr) IV Continuous <Continuous>  influenza  Vaccine (HIGH DOSE) 0.7 milliLiter(s) IntraMuscular once  lactated ringers Bolus 200 milliLiter(s) IV Bolus once  loperamide 4 milliGRAM(s) Oral every 8 hours  metoprolol succinate ER 25 milliGRAM(s) Oral daily  mirtazapine 30 milliGRAM(s) Oral at bedtime  multivitamin 1 Tablet(s) Oral daily  opium Tincture 6 milliGRAM(s) Oral every 12 hours  pantoprazole    Tablet 40 milliGRAM(s) Oral two times a day  Parenteral Nutrition - Adult 1 Each (83 mL/Hr) TPN Continuous <Continuous>  Parenteral Nutrition - Adult 1 Each (83 mL/Hr) TPN Continuous <Continuous>  povidone iodine 10% Solution 1 Application(s) Topical daily  psyllium Powder 1 Packet(s) Oral every 8 hours  zinc sulfate 220 milliGRAM(s) Oral daily    MEDICATIONS  (PRN):  acetaminophen     Tablet .. 650 milliGRAM(s) Oral every 6 hours PRN Temp greater or equal to 38C (100.4F), Mild Pain (1 - 3)  melatonin 3 milliGRAM(s) Oral at bedtime PRN Insomnia  ondansetron Injectable 4 milliGRAM(s) IV Push every 6 hours PRN Nausea and/or Vomiting  oxyCODONE    IR 5 milliGRAM(s) Oral every 4 hours PRN Severe Pain (7 - 10)  sodium chloride 0.9% lock flush 10 milliLiter(s) IV Push every 1 hour PRN Pre/post blood products, medications, blood draw, and to maintain line patency      Vital Signs Last 24 Hrs  T(C): 37.7 (2023 14:20), Max: 38.4 (2023 18:35)  T(F): 99.9 (2023 14:20), Max: 101.1 (2023 18:35)  HR: 110 (2023 16:22) (96 - 146)  BP: 124/65 (2023 16:22) (114/56 - 147/75)  BP(mean): 87 (2023 16:22) (76 - 92)  RR: 18 (2023 16:22) (17 - 18)  SpO2: 100% (2023 16:22) (100% - 100%)    Parameters below as of 2023 16:22  Patient On (Oxygen Delivery Method): room air        Physical Exam:  General: NAD, resting comfortably in bed  Pulmonary: Nonlabored breathing, no respiratory distress  Cardiovascular: NSR  Abdominal: soft, NT/ND  Extremities: WWP, normal strength, L AKA site clean w/o signs of infection  Neuro: A/O x 3, CNs II-XII grossly intact, no focal deficits    I&O's Summary    2023 07:  -  2023 07:00  --------------------------------------------------------  IN: 4483.2 mL / OUT: 5650 mL / NET: -1166.8 mL    2023 07:01  -  2023 17:16  --------------------------------------------------------  IN: 1480 mL / OUT: 1200 mL / NET: 280 mL        LABS:                        6.8    11.80 )-----------( 378      ( 2023 11:29 )             21.3         138  |  106  |  7   ----------------------------<  94  3.2<L>   |  26  |  0.52    Ca    8.0<L>      2023 05:30  Phos  1.7       Mg     1.5           PT/INR - ( 2023 05:30 )   PT: 15.6 sec;   INR: 1.31          PTT - ( 2023 05:30 )  PTT:33.7 sec  Urinalysis Basic - ( 2023 08:44 )    Color: Yellow / Appearance: Clear / S.015 / pH: x  Gluc: x / Ketone: NEGATIVE  / Bili: Negative / Urobili: 0.2 E.U./dL   Blood: x / Protein: NEGATIVE mg/dL / Nitrite: NEGATIVE   Leuk Esterase: NEGATIVE / RBC: x / WBC x   Sq Epi: x / Non Sq Epi: x / Bacteria: x

## 2023-02-22 NOTE — PROGRESS NOTE ADULT - SUBJECTIVE AND OBJECTIVE BOX
Denies any pain.  Currently getting transfused a unit of blood.      ***Note in progress***    OVERNIGHT EVENTS: NAEO    SUBJECTIVE / INTERVAL HPI: Patient seen and examined at bedside. Patient denying chest pain, SOB, palpitations, cough. Patient denies fever, chills, HA, Dizziness, change in vision/hearing, N/V, abdominal pain, diarrhea, constipation, hematochezia/melena, dysuria, hematuria, new onset weakness/numbness, LE pain and/or swelling.    Remaining ROS negative       PHYSICAL EXAM:    General: WDWN  HEENT: NC/AT; PERRL, anicteric sclera; MMM  Neck: supple  Cardiovascular: +S1/S2, RRR  Respiratory: CTA B/L; no W/R/R  Gastrointestinal: soft, NT/ND; +BSx4  Extremities: WWP; no edema, clubbing or cyanosis  Vascular: 2+ radial, DP/PT pulses B/L  Neurological: AAOx3; no focal deficits  Psychiatric: pleasant mood and affect  Dermatologic: no appreciable wounds or damage to the skin    VITAL SIGNS:  Vital Signs Last 24 Hrs  T(C): 37.7 (2023 14:20), Max: 38.4 (2023 18:35)  T(F): 99.9 (2023 14:20), Max: 101.1 (2023 18:35)  HR: 110 (2023 16:22) (96 - 146)  BP: 124/65 (2023 16:22) (114/56 - 147/75)  BP(mean): 87 (2023 16:22) (76 - 92)  RR: 18 (2023 16:22) (17 - 18)  SpO2: 100% (2023 16:22) (100% - 100%)    Parameters below as of 2023 16:22  Patient On (Oxygen Delivery Method): room air          MEDICATIONS:  MEDICATIONS  (STANDING):  aMIOdarone    Tablet 100 milliGRAM(s) Oral every 24 hours  ascorbic acid 500 milliGRAM(s) Oral daily  chlorhexidine 2% Cloths 1 Application(s) Topical daily  cholestyramine Powder (Sugar-Free) 2 Gram(s) Oral three times a day  DAPTOmycin IVPB 500 milliGRAM(s) IV Intermittent every 24 hours  dextrose 5% + lactated ringers. 1000 milliLiter(s) (100 mL/Hr) IV Continuous <Continuous>  diphenoxylate/atropine 2 Tablet(s) Oral every 8 hours  enoxaparin Injectable 65 milliGRAM(s) SubCutaneous every 12 hours  ertapenem  IVPB 1000 milliGRAM(s) IV Intermittent every 24 hours  fat emulsion (Fish Oil and Plant Based) 20% Infusion 0.7764 Gm/kG/Day (20.83 mL/Hr) IV Continuous <Continuous>  influenza  Vaccine (HIGH DOSE) 0.7 milliLiter(s) IntraMuscular once  lactated ringers Bolus 200 milliLiter(s) IV Bolus once  loperamide 4 milliGRAM(s) Oral every 8 hours  metoprolol succinate ER 25 milliGRAM(s) Oral daily  mirtazapine 30 milliGRAM(s) Oral at bedtime  multivitamin 1 Tablet(s) Oral daily  opium Tincture 6 milliGRAM(s) Oral every 12 hours  pantoprazole    Tablet 40 milliGRAM(s) Oral two times a day  Parenteral Nutrition - Adult 1 Each (83 mL/Hr) TPN Continuous <Continuous>  Parenteral Nutrition - Adult 1 Each (83 mL/Hr) TPN Continuous <Continuous>  povidone iodine 10% Solution 1 Application(s) Topical daily  psyllium Powder 1 Packet(s) Oral every 8 hours  zinc sulfate 220 milliGRAM(s) Oral daily    MEDICATIONS  (PRN):  acetaminophen     Tablet .. 650 milliGRAM(s) Oral every 6 hours PRN Temp greater or equal to 38C (100.4F), Mild Pain (1 - 3)  melatonin 3 milliGRAM(s) Oral at bedtime PRN Insomnia  ondansetron Injectable 4 milliGRAM(s) IV Push every 6 hours PRN Nausea and/or Vomiting  oxyCODONE    IR 5 milliGRAM(s) Oral every 4 hours PRN Severe Pain (7 - 10)  sodium chloride 0.9% lock flush 10 milliLiter(s) IV Push every 1 hour PRN Pre/post blood products, medications, blood draw, and to maintain line patency      ALLERGIES:  Allergies    No Known Allergies    Intolerances        LABS:                        6.8    11.80 )-----------( 378      ( 2023 11:29 )             21.3     02-22    138  |  106  |  7   ----------------------------<  94  3.2<L>   |  26  |  0.52    Ca    8.0<L>      2023 05:30  Phos  1.7       Mg     1.5           PT/INR - ( 2023 05:30 )   PT: 15.6 sec;   INR: 1.31          PTT - ( 2023 05:30 )  PTT:33.7 sec  Urinalysis Basic - ( 2023 08:44 )    Color: Yellow / Appearance: Clear / S.015 / pH: x  Gluc: x / Ketone: NEGATIVE  / Bili: Negative / Urobili: 0.2 E.U./dL   Blood: x / Protein: NEGATIVE mg/dL / Nitrite: NEGATIVE   Leuk Esterase: NEGATIVE / RBC: x / WBC x   Sq Epi: x / Non Sq Epi: x / Bacteria: x      CAPILLARY BLOOD GLUCOSE          RADIOLOGY & ADDITIONAL TESTS: Reviewed.

## 2023-02-22 NOTE — CHART NOTE - NSCHARTNOTEFT_GEN_A_CORE
The writer met with the patient for f/u individual psychotherapy. The writer and patient reflected on the patient's hospitalization, with the patient seemingly eating and socializing more at this time. The patient reported continued passive suicidal ideation, though denied any thoughts of self-harm, identifying "mess" as being a motivating factor not to harm himself. No HIIPAVH reported. At the present time the patient does not appear to be at imminent risk of harm to self or others. Despite continued passive SI, patient mood appears somewhat improved and he appears to be caring for himself more. The patient was meaningfully engaged and receptive to these interventions, ending the session in good behavioral and emotional control.     :::Recommendations:::  - ensure patient does not have access to sharps such as kitchen knives or scissors  - when medically possible and appropriate, bunch evening and early morning interventions together to enable more consistent sleep The writer met with the patient for f/u individual psychotherapy. The writer and patient reflected on the patient's hospitalization, with the patient seemingly eating and socializing more at this time. The patient reported continued passive suicidal ideation, though denied any thoughts of self-harm, identifying "mess" as being a motivating factor not to harm himself. No HIIPAVH reported. At the present time the patient is not at acute risk. Despite continued passive SI, patient mood appears somewhat improved and he appears to be caring for himself more. The patient was meaningfully engaged and receptive to these interventions, ending the session in good behavioral and emotional control.   Risks assessment: low acute (multiple protective factors, including ongoing future oriented thinking, motivation to improve, resilience).  :::Recommendations:::  -continue supportive psychotherapy

## 2023-02-23 NOTE — PROGRESS NOTE ADULT - ASSESSMENT
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 Now with new fevers (on daptomycin and ertapenem) since 2/21, shortly following RUE PICC placement for TPN on 2/21. At elevated risk of invasive candidiasis i/s/o TPN exposure.   - f/u bcx 2/23  - can retain PICC presently with low threshold to remove if develops signs of worsening sepsis/HD instability or if bcx returns positive  - advise repeat CT AP with contrast to assess for residual undrained RLQ abscess as etiology of breakthrough fevers despite targeted antibiotic therapy (see prior CT 2/12 and 1/26)  - advise continue daptomycin 500mg IV q24h (please add differential to CBC to assess for new peripheral eosinophilia) although CXR clear so less likely to be daptomycin-induced eosinophilic pneumonia  - advise continue ertapenem 1g IV q24h

## 2023-02-23 NOTE — PROGRESS NOTE ADULT - ASSESSMENT
77M first presented to [facility] from [facility] due to unresponsiveness (responded to Narcan). At [place], found to have subsegmental pulmonary embolism in RUL, rapid atrial flutter with severely reduced LVEF with LV thrombus. Transferred to [facility] on 12/7/22 for LORENZO and possible ablation. At [facility], was found to have left leg ischemia (left leg arterial thrombus on LE duplex on 12/8). Was being considered for rhythm control when he developed abdominal pain, CTA (12/10/22) showed embolus in SMA, bowel infarct, requiring ex-lap on 12/11 with SMA embolectomy, 80cm small bowel resection; On 12/13, underwent 2nd look laparotomy, with 80cm small bowel resection, closure with abthera. On 12/15, underwent 3rd look laparotomy, end-to-end anastomosis of remaining bowel, loop ileostomy and abdominal closure. Eventually underwent LORENZO/DCCV on 12/30/22, now in sinus rhythm. More recently found to have k. 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     #bacteremia - k pneumoniae  -cultures cleared, on erta/dapto - but still continues to have fever  - reach out to ID again  -management per ID  -abscess not drainable per IR  -PICC placed today    #s/p left AKA  management per primary team/ vascular    #anemia  - hemoglobin improved following transfusion of PRBCs yesterday  no e/o active bleeding - transfusing and repeating post-transfusion CBC  - he has been intermittently tachycardic for entire hospitalization  - etiology of anemia likely multifactorial    #Suicidal Ideation  #Insomnia   being seen by psych, appreciate input  - does not require SI precautions per last psychiatry note  - Continue mirtazapine 30mg QHS     # Acute Systolic Heart Failure   - Metoprolol succinate 25mg qd - continue    # Atrial Flutter  - s/p DCCV 12/30  - EP recs - uninterrupted AC x 30 days ; January 29th was 30 days post DCCV.   -Continue Lovenox, amiodarone and metoprolol succinate    # Pulmonary embolism (subsegmental RUL)   - After completion of AC for DCCV, would need to continue AC for PE - currently continuing lovenox    # Bowel ischemia - s/p SMA embolectomy; Small bowel resection  - ileostomy in place  - plan of primary team  - loperamide and diphenoxylate atropine    # Severe protein-calorie malnutrition  # Sacral wound   - appreciate input from nutrition  - Wound care per nursing    #hypokalemia  - replete  - repeat labs tomorrow    #DVT ppx - continue lovenox for PE     >35 minutes spent on this encounter, including face to face with patient, care coordination and documentation.   Plan of care discussed with primary team

## 2023-02-23 NOTE — BH CONSULTATION LIAISON PROGRESS NOTE - NSBHPSYCHOLCOGORIENT_PSY_A_CORE
PSYCHIATRIC PROGRESS NOTE         Patient Name  Amelia Fischer   Date of Birth 1994   [facility] [id number]   Medical Record Number  [id number]      Age  26 y.o. PCP Trey Osborne MD   Admit date:  8/21/2017    Room Number  327/01  @ [facility]   Date of Service  8/22/2017          PSYCHOTHERAPY SESSION NOTE:  Length of psychotherapy session: 15 minutes    Main condition/diagnosis/issues treated during session today, 8/22/2017 : psychosis, substance use    I employed Cognitive Behavioral therapy techniques, Reality-Oriented psychotherapy, as well as supportive psychotherapy in regards to various ongoing psychosocial stressors, including the following: pre-admission and current problems; housing issues; occupational issues; academic issues; legal issues; medical issues; and stress of hospitalization. Interpersonal relationship issues and psychodynamic conflicts explored. Attempts made to alleviate maladaptive patterns. We, also, worked on issues of denial & effects of substance dependency/use     Overall, patient is not progressing    Treatment Plan Update (reviewed an updated 8/22/2017) : I will modify psychotherapy tx plan by implementing more stress management strategies, building upon cognitive behavioral techniques, increasing coping skills, as well as shoring up psychological defenses). An extended energy and skill set was needed to engage pt in psychotherapy due to some of the following: resistiveness, complexity, negativity, confrontational nature, hostile behaviors, and/or severe abnormalities in thought processes/psychosis resulting in the loss of expressive/receptive language communication skills. E & M PROGRESS NOTE:         HISTORY       CC:  \"want to go back to work\"  HISTORY OF PRESENT ILLNESS/INTERVAL HISTORY:  (reviewed/updated 8/22/2017). per initial evaluation: The patient, Amelia Fischer, is a 26 y.o.   WHITE [place]  male with a past psychiatric history significant for schizoaffective disorder, who presents at this time with complaints of (and/or evidence of) the following emotional symptoms: psychotic behavior. Additional symptomatology include alcohol THC use, delusion of demons, antichrist and 7 deadly sin\", thought alienation- people can his mind, poor sleep and non compliance with med . The above symptoms have been present for few days. These symptoms are of severe severity. These symptoms are constant in nature. The patient's condition has been precipitated by and psychosocial stressors (substance abuse ). Patient's condition made worse by continued illicit drug use and alcohol use as well as treatment noncompliance. UDS: +MJ, Benzo; BAL=0.       Liban Mc presents/reports/evidences the following emotional symptoms today, 8/22/2017:psychotic behavior. The above symptoms have been present for few days. These symptoms are of severe severity. The symptoms are intermittent/ fleeting in nature. Additional symptomatology and features include anxiety, preoccupied with dc. Med seeking. Guarded and delusional themes. Denies SI/HI/AVH. SIDE EFFECTS: (reviewed/updated 8/22/2017)  None reported or admitted to. No noted toxicity with use of    ALLERGIES:(reviewed/updated 8/22/2017)  Allergies   Allergen Reactions    Penicillin G Hives      MEDICATIONS PRIOR TO ADMISSION:(reviewed/updated 8/22/2017)  Prescriptions Prior to Admission   Medication Sig    OLANZapine (ZYPREXA) 20 mg tablet Take 20 mg by mouth nightly. PAST MEDICAL HISTORY: Past medical history from the initial psychiatric evaluation has been reviewed (reviewed/updated 8/22/2017) with no additional updates (I asked patient and no additional past medical history provided). Past Medical History:   Diagnosis Date    Schizoaffective disorder ([facility] [address]    No past surgical history on file.    SOCIAL HISTORY: Social history from the initial psychiatric evaluation has been reviewed (reviewed/updated 8/22/2017) with no additional updates (I asked patient and no additional social history provided). Social History     Social History    Marital status: SINGLE     Spouse name: N/A    Number of children: N/A    Years of education: N/A     Occupational History    Not on file. Social History Main Topics    Smoking status: Heavy Tobacco Smoker     Packs/day: 2.00    Smokeless tobacco: Never Used    Alcohol use Yes      Comment: occasionally    Drug use: Yes     Special: Marijuana, Benzodiazepines      Comment: sig cocaine use in the past. recently dc from [address] [place] [address]- was removed from half way house due to [facility] use    Sexual activity: Not on file     Other Topics Concern    Not on file     Social History Narrative    Single    No legal charges pending. FAMILY HISTORY: Family history from the initial psychiatric evaluation has been reviewed (reviewed/updated 8/22/2017) with no additional updates (I asked patient and no additional family history provided). Family History   Problem Relation Age of Onset    Schizophrenia Mother        REVIEW OF SYSTEMS: (reviewed/updated 8/22/2017)  Appetite:good   Sleep: fitful   All other Review of Systems: Psychological ROS: positive for - anxiety  Respiratory ROS: no cough, shortness of breath, or wheezing  Cardiovascular ROS: no chest pain or dyspnea on exertion  Neurological ROS: no TIA or stroke symptoms         [address] (MSE):    MSE FINDINGS ARE WITHIN NORMAL LIMITS (WNL) UNLESS OTHERWISE STATED BELOW. ( ALL OF THE BELOW CATEGORIES OF THE MSE HAVE BEEN REVIEWED (reviewed 8/22/2017) AND UPDATED AS DEEMED APPROPRIATE )  General Presentation age appropriate and disheveled, guarded, uncooperative and unreliable   Orientation oriented to time, place and person   Vital Signs  See below (reviewed 8/22/2017); Vital Signs (BP, Pulse, & Temp) are within normal limits if not listed below.    Gait and Station Stable/steady, no ataxia   Musculoskeletal System No extrapyramidal symptoms (EPS); no abnormal muscular movements or Tardive Dyskinesia (TD); muscle strength and tone are within normal limits   Language No aphasia or dysarthria   Speech:  normal pitch   Thought Processes logical; normal rate of thoughts; fair abstract reasoning/computation   Thought Associations goal directed   Thought Content paranoid delusions, free of hallucinations and preoccupations   Suicidal Ideations none   Homicidal Ideations none   Mood:  anxious  and irritable   Affect:  anxious, increased in intensity and irritable   Memory recent  intact   Memory remote:  intact   Concentration/Attention:  distractable   Fund of Knowledge average   Insight:  limited   Reliability untruthful   Judgment:  poor          VITALS:     Patient Vitals for the past 24 hrs:   Temp Pulse Resp BP   08/22/17 1721 97.2 °F (36.2 °C) 69 18 139/83   08/22/17 0635 - 82 16 110/74     Wt Readings from Last 3 Encounters:   08/21/17 98.5 kg (217 lb 3.2 oz)   08/20/17 98.7 kg (217 lb 9.6 oz)     Temp Readings from Last 3 Encounters:   08/22/17 97.2 °F (36.2 °C)   08/21/17 97.6 °F (36.4 °C)     BP Readings from Last 3 Encounters:   08/22/17 139/83   08/21/17 113/71     Pulse Readings from Last 3 Encounters:   08/22/17 69   08/21/17 68            DATA     LABORATORY DATA:(reviewed/updated 8/22/2017)  Recent Results (from the past 24 hour(s))   GLUCOSE, FASTING    Collection Time: 08/22/17  5:04 AM   Result Value Ref Range    Glucose 95 65 - 100 MG/DL   TSH 3RD GENERATION    Collection Time: 08/22/17  5:04 AM   Result Value Ref Range    TSH 1.50 0.36 - 3.74 uIU/mL   LIPID PANEL    Collection Time: 08/22/17  5:04 AM   Result Value Ref Range    LIPID PROFILE          Cholesterol, total 121 <200 MG/DL    Triglyceride 100 <150 MG/DL    HDL Cholesterol 34 MG/DL    LDL, calculated 67 0 - 100 MG/DL    VLDL, calculated 20 MG/DL    CHOL/HDL Ratio 3.6 0 - 5.0       No results found for: VALF2, VALAC, VALP, VALPR, DS6, CRBAM, CRBAMP, CARB2, XCRBAM  No results found for: LITHM   RADIOLOGY REPORTS:(reviewed/updated 8/22/2017)  No results found.        MEDICATIONS     ALL MEDICATIONS:   Current Facility-Administered Medications   Medication Dose Route Frequency    buPROPion XL (WELLBUTRIN XL) tablet 300 mg  300 mg Oral 7am    OLANZapine (ZyPREXA) tablet 20 mg  20 mg Oral QPM    LORazepam (ATIVAN) tablet 1 mg  1 mg Oral Q12H PRN    hydrOXYzine pamoate (VISTARIL) capsule 50 mg  50 mg Oral QID PRN    ziprasidone (GEODON) 20 mg in sterile water (preservative free) 1 mL injection  20 mg IntraMUSCular BID PRN    OLANZapine (ZyPREXA) tablet 5 mg  5 mg Oral Q6H PRN    benztropine (COGENTIN) tablet 2 mg  2 mg Oral BID PRN    benztropine (COGENTIN) injection 2 mg  2 mg IntraMUSCular BID PRN    LORazepam (ATIVAN) injection 2 mg  2 mg IntraMUSCular Q4H PRN    zolpidem (AMBIEN) tablet 10 mg  10 mg Oral QHS PRN    acetaminophen (TYLENOL) tablet 650 mg  650 mg Oral Q4H PRN    ibuprofen (MOTRIN) tablet 400 mg  400 mg Oral Q8H PRN    magnesium hydroxide (MILK OF MAGNESIA) 400 mg/5 mL oral suspension 30 mL  30 mL Oral DAILY PRN    nicotine (NICODERM CQ) 21 mg/24 hr patch 1 Patch  1 Patch TransDERmal DAILY PRN      SCHEDULED MEDICATIONS:   Current Facility-Administered Medications   Medication Dose Route Frequency    buPROPion XL (WELLBUTRIN XL) tablet 300 mg  300 mg Oral 7am    OLANZapine (ZyPREXA) tablet 20 mg  20 mg Oral QPM          ASSESSMENT & PLAN     DIAGNOSES REQUIRING ACTIVE TREATMENT AND MONITORING: (reviewed/updated 8/22/2017)  Patient Active Hospital Problem List:   Schizophrenia ([facility] [address] (8/21/2017) vs schizoaffective disorder depressive type ( likely) vs substance induced psychotic disorder    Assessment: acute on chronic- paranoid type- guarded, delusion, has hx of depressive episode in the past-  tx with Zyprexa, Wellbutrin, Prozac and fairly stable until he started using drugs again. Was admitted to [facility] with sig drug use, psychosis and Invega sustenna added. Currently on Invega 156 mg. Denies SI/HI. Guarded and preoccupied with dc. Pt is homeless and high risk of relapse if he were to leave. Plan: will ct to adjust med- Wellbutrin and Zyprexa     Polysubstance abuse (8/21/2017)    Assessment: THC,alcohol and cocaine per hx- relapsed- was sober for 1.5 month after rehab at [facility]- removed from half way [place] week ago due to [facility]- low motivation, limited insight- med seeking    Plan: educate, rehab, limit use of BENZO-prn vistaril      Dependence on nicotine from cigarettes (8/21/2017)    Assessment: sig use    Plan: offered nicotine patch      I will continue to monitor blood levels (Depakote, Tegretol, lithium, clozapine---a drug with a narrow therapeutic index= NTI) and associated labs for drug therapy implemented that require intense monitoring for toxicity as deemed appropriate based on current medication side effects and pharmacodynamically determined drug 1/2 lives. In summary, Josh Clark, is a 26 y.o.  male who presents with a severe exacerbation of the principal diagnosis of Schizophrenia ([facility] [address]  Patient's condition is worsening/not improving/not stable . Patient requires continued inpatient hospitalization for further stabilization, safety monitoring and medication management. I will continue to coordinate the provision of individual, milieu, occupational, group, and substance abuse therapies to address target symptoms/diagnoses as deemed appropriate for the individual patient. A coordinated, multidisplinary treatment team round was conducted with the patient (this team consists of the nurse, psychiatric unit pharmcist,  and writer). Complete current electronic health record for patient has been reviewed today including consultant notes, ancillary staff notes, nurses and psychiatric tech notes.     Suicide risk assessment completed and patient deemed to be of low risk for suicide at this time. The following regarding medications was addressed during rounds with patient:   the risks and benefits of the proposed medication. The patient was given the opportunity to ask questions. Informed consent given to the use of the above medications. Will continue to adjust psychiatric and non-psychiatric medications (see above \"medication\" section and orders section for details) as deemed appropriate & based upon diagnoses and response to treatment. I will continue to order blood tests/labs and diagnostic tests as deemed appropriate and review results as they become available (see orders for details and above listed lab/test results). I will order psychiatric records from previous [facility] to further elucidate the nature of patient's psychopathology and review once available. I will gather additional collateral information from friends, family and o/p treatment team to further elucidate the nature of patient's psychopathology and baselline level of psychiatric functioning. I certify that this patient's inpatient psychiatric hospital services furnished since the previous certification were, and continue to be, required for treatment that could reasonably be expected to improve the patient's condition, or for diagnostic study, and that the patient continues to need, on a daily basis, active treatment furnished directly by or requiring the supervision of inpatient psychiatric facility personnel. In addition, the hospital records show that services furnished were intensive treatment services, admission or related services, or equivalent services.     EXPECTED DISCHARGE DATE/DAY: TBD     DISPOSITION: Home       Signed By:   Chandan Lo MD  8/22/2017 Oriented to time, place, person, situation

## 2023-02-23 NOTE — PROGRESS NOTE ADULT - ASSESSMENT
78M with PMHx of IVDU found unresponsive at his nursing home, resolved after Narcan in the field and brought to [facility]. Found to have PE, atrial flutter, and large LV thrombus on ECHOand CTA showing mid SMA with embolus s/p Ex lap, SMA embolectomy, 80cm SBR, abthera vac left in discontinuity (12/11) and transferred to SICU intubated. S/p second look (12/13) and most recently s/p OR for 3rd look, end-to-end anastomosis of remaining bowel, loop ileostomy and abdomen closure (12/15). Prev stepped down to telemetry. LLE ischemia, AKA delayed by nutritional optimization. Transferred to SICU in the setting of sepsis for HD monitoring. 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    - Maintain surgical dressing, changed today, can reinforce as needed  - Will discuss RTOR plan when medically stable and afebrile  - Remainder of care per primary team  - Vascular will continue to follow

## 2023-02-23 NOTE — BH CONSULTATION LIAISON PROGRESS NOTE - NSBHATTESTCOMMENTATTENDFT_PSY_A_CORE
Patient is a 78 year old male with history of substance use and no significant past medical history (poor medical follow up, last PCP visit 20 years prior to admission), presenting with unresponsiveness from nursing home to [facility], found to be in Aflutter w RVR with subsegmental PE RUL, transferred to [facility], found to have LV thrombus and worsening HF, EF 10-15%. 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 Pt currently s/p BKA. Psychiatry consulted due to chronic depressed mood in the hospital and suicidality. On repeated assessments pt acknowledged low mood and hopelessness in context of his current medical complications and loss of independence but denied any active SI.   On re-assessment today he continues to deny SI, presents with fatigue due to his current medical complications (fever) and mood fluctuations.     Plan:  -patient does not require 1:1 observation for acute suicidality  -continue mirtazapine 30mg QHS for ongoing depressive sx and insomnia  -Encourage sleep hygiene and limiting sleep during the day    -Delirium precautions  -Psychology to follow the pt daily for supportive psychotherapy

## 2023-02-23 NOTE — PROGRESS NOTE ADULT - SUBJECTIVE AND OBJECTIVE BOX
Feeling hungry, and wants to know if he can eat some instant oatmeal. Otherwise denies any pain.   Febrile earlier in the morning, but refused lab draw.     Remaining ROS negative       PHYSICAL EXAM:    General: fatigued appearing, but in no acute distress  HEENT: NC/AT; anicteric sclera, slightly dry mucous membranes  Cardiovascular: +S1/S2, RRR, no murmurs, rubs or gallops  Respiratory: diminished breath sounds at bases, no crackles, rales or rhonchi apparent  Gastrointestinal: soft, NT/ND; +BSx4  Extremities: RLE wwp, s/p L AKA  Neurological: speech is fluent, no asymmetry, follows commands    VITAL SIGNS:  Vital Signs Last 24 Hrs  T(C): 37.6 (2023 14:00), Max: 38.9 (2023 04:00)  T(F): 99.6 (2023 14:00), Max: 102.1 (2023 06:33)  HR: 112 (2023 14:22) (92 - 114)  BP: 136/71 (2023 14:22) (98/52 - 136/71)  BP(mean): 94 (2023 14:22) (71 - 94)  RR: 17 (2023 13:03) (16 - 18)  SpO2: 100% (2023 14:22) (100% - 100%)    Parameters below as of 2023 14:05  Patient On (Oxygen Delivery Method): room air          MEDICATIONS:  MEDICATIONS  (STANDING):  aMIOdarone    Tablet 100 milliGRAM(s) Oral every 24 hours  ascorbic acid 500 milliGRAM(s) Oral daily  chlorhexidine 2% Cloths 1 Application(s) Topical daily  cholestyramine Powder (Sugar-Free) 2 Gram(s) Oral three times a day  DAPTOmycin IVPB 500 milliGRAM(s) IV Intermittent every 24 hours  dextrose 5% + lactated ringers. 1000 milliLiter(s) (100 mL/Hr) IV Continuous <Continuous>  diphenoxylate/atropine 2 Tablet(s) Oral every 8 hours  enoxaparin Injectable 65 milliGRAM(s) SubCutaneous every 12 hours  ertapenem  IVPB 1000 milliGRAM(s) IV Intermittent every 24 hours  fat emulsion (Fish Oil and Plant Based) 20% Infusion 0.7764 Gm/kG/Day (20.83 mL/Hr) IV Continuous <Continuous>  influenza  Vaccine (HIGH DOSE) 0.7 milliLiter(s) IntraMuscular once  loperamide 4 milliGRAM(s) Oral every 8 hours  metoprolol succinate ER 25 milliGRAM(s) Oral daily  mirtazapine 30 milliGRAM(s) Oral at bedtime  multivitamin 1 Tablet(s) Oral daily  opium Tincture 6 milliGRAM(s) Oral every 12 hours  pantoprazole    Tablet 40 milliGRAM(s) Oral two times a day  Parenteral Nutrition - Adult 1 Each (83 mL/Hr) TPN Continuous <Continuous>  Parenteral Nutrition - Adult 1 Each (83 mL/Hr) TPN Continuous <Continuous>  povidone iodine 10% Solution 1 Application(s) Topical daily  psyllium Powder 1 Packet(s) Oral every 8 hours  zinc sulfate 220 milliGRAM(s) Oral daily    MEDICATIONS  (PRN):  acetaminophen     Tablet .. 650 milliGRAM(s) Oral every 6 hours PRN Temp greater or equal to 38C (100.4F), Mild Pain (1 - 3)  melatonin 3 milliGRAM(s) Oral at bedtime PRN Insomnia  ondansetron Injectable 4 milliGRAM(s) IV Push every 6 hours PRN Nausea and/or Vomiting  oxyCODONE    IR 5 milliGRAM(s) Oral every 4 hours PRN Severe Pain (7 - 10)  sodium chloride 0.9% lock flush 10 milliLiter(s) IV Push every 1 hour PRN Pre/post blood products, medications, blood draw, and to maintain line patency      ALLERGIES:  Allergies    No Known Allergies    Intolerances        LABS:                        7.6    11.25 )-----------( 385      ( 2023 05:30 )             23.2     02-    142  |  109<H>  |  7   ----------------------------<  139<H>  3.1<L>   |  26  |  0.54    Ca    7.9<L>      2023 05:30  Phos  3.5     -  Mg     1.6           PT/INR - ( 2023 05:30 )   PT: 15.6 sec;   INR: 1.31          PTT - ( 2023 05:30 )  PTT:33.7 sec  Urinalysis Basic - ( 2023 08:44 )    Color: Yellow / Appearance: Clear / S.015 / pH: x  Gluc: x / Ketone: NEGATIVE  / Bili: Negative / Urobili: 0.2 E.U./dL   Blood: x / Protein: NEGATIVE mg/dL / Nitrite: NEGATIVE   Leuk Esterase: NEGATIVE / RBC: x / WBC x   Sq Epi: x / Non Sq Epi: x / Bacteria: x      CAPILLARY BLOOD GLUCOSE          RADIOLOGY & ADDITIONAL TESTS: Reviewed.

## 2023-02-23 NOTE — PROGRESS NOTE ADULT - SUBJECTIVE AND OBJECTIVE BOX
INTERVAL HPI/OVERNIGHT EVENTS: s/p RUE PICC placement  for TPN; onset of new fevers since . Denies localizing symptoms.    CONSTITUTIONAL:  Negative fever or chills, feels well, good appetite  EYES:  Negative  blurry vision or double vision  CARDIOVASCULAR:  Negative for chest pain or palpitations  RESPIRATORY:  Negative for cough, wheezing, or SOB   GASTROINTESTINAL:  Negative for nausea, vomiting, diarrhea, constipation, or abdominal pain  GENITOURINARY:  Negative frequency, urgency or dysuria  NEUROLOGIC:  No headache, confusion, dizziness, lightheadedness      ANTIBIOTICS/RELEVANT:    MEDICATIONS  (STANDING):  aMIOdarone    Tablet 100 milliGRAM(s) Oral every 24 hours  ascorbic acid 500 milliGRAM(s) Oral daily  chlorhexidine 2% Cloths 1 Application(s) Topical daily  cholestyramine Powder (Sugar-Free) 2 Gram(s) Oral three times a day  DAPTOmycin IVPB 500 milliGRAM(s) IV Intermittent every 24 hours  dextrose 5% + lactated ringers. 1000 milliLiter(s) (100 mL/Hr) IV Continuous <Continuous>  diphenoxylate/atropine 2 Tablet(s) Oral every 8 hours  enoxaparin Injectable 65 milliGRAM(s) SubCutaneous every 12 hours  ertapenem  IVPB 1000 milliGRAM(s) IV Intermittent every 24 hours  fat emulsion (Fish Oil and Plant Based) 20% Infusion 0.7764 Gm/kG/Day (20.83 mL/Hr) IV Continuous <Continuous>  influenza  Vaccine (HIGH DOSE) 0.7 milliLiter(s) IntraMuscular once  loperamide 4 milliGRAM(s) Oral every 8 hours  metoprolol succinate ER 25 milliGRAM(s) Oral daily  mirtazapine 30 milliGRAM(s) Oral at bedtime  multivitamin 1 Tablet(s) Oral daily  opium Tincture 6 milliGRAM(s) Oral every 12 hours  pantoprazole    Tablet 40 milliGRAM(s) Oral two times a day  Parenteral Nutrition - Adult 1 Each (83 mL/Hr) TPN Continuous <Continuous>  Parenteral Nutrition - Adult 1 Each (83 mL/Hr) TPN Continuous <Continuous>  povidone iodine 10% Solution 1 Application(s) Topical daily  psyllium Powder 1 Packet(s) Oral every 8 hours  zinc sulfate 220 milliGRAM(s) Oral daily    MEDICATIONS  (PRN):  acetaminophen     Tablet .. 650 milliGRAM(s) Oral every 6 hours PRN Temp greater or equal to 38C (100.4F), Mild Pain (1 - 3)  melatonin 3 milliGRAM(s) Oral at bedtime PRN Insomnia  ondansetron Injectable 4 milliGRAM(s) IV Push every 6 hours PRN Nausea and/or Vomiting  oxyCODONE    IR 5 milliGRAM(s) Oral every 4 hours PRN Severe Pain (7 - 10)  sodium chloride 0.9% lock flush 10 milliLiter(s) IV Push every 1 hour PRN Pre/post blood products, medications, blood draw, and to maintain line patency        Vital Signs Last 24 Hrs  T(C): 37.6 (2023 14:00), Max: 38.9 (2023 04:00)  T(F): 99.6 (2023 14:00), Max: 102.1 (2023 06:33)  HR: 104 (2023 16:08) (92 - 114)  BP: 131/64 (2023 16:08) (98/52 - 136/71)  BP(mean): 90 (2023 16:08) (71 - 94)  RR: 17 (2023 13:03) (16 - 17)  SpO2: 98% (2023 16:08) (98% - 100%)    Parameters below as of 2023 14:05  Patient On (Oxygen Delivery Method): room air        PHYSICAL EXAM:  Constitutional: NAD  Eyes: JOHN, EOMI  Ear/Nose/Throat: no oral lesion, no sinus tenderness on percussion	  Neck: no JVD, no lymphadenopathy, supple  Respiratory: CTA keerthi  Cardiovascular: S1S2 RRR, no murmurs  Gastrointestinal:soft, (+) BS, no HSM  Extremities: L AKA stump site c/d/i; R foot wwp, weeping edema of toes   Vascular: DP Pulse:	right normal; left normal      LABS:                        7.6    11.25 )-----------( 385      ( 2023 05:30 )             23.2     02-23    142  |  109<H>  |  7   ----------------------------<  139<H>  3.1<L>   |  26  |  0.54    Ca    7.9<L>      2023 05:30  Phos  3.5       Mg     1.6           PT/INR - ( 2023 05:30 )   PT: 15.6 sec;   INR: 1.31          PTT - ( 2023 05:30 )  PTT:33.7 sec  Urinalysis Basic - ( 2023 08:44 )    Color: Yellow / Appearance: Clear / S.015 / pH: x  Gluc: x / Ketone: NEGATIVE  / Bili: Negative / Urobili: 0.2 E.U./dL   Blood: x / Protein: NEGATIVE mg/dL / Nitrite: NEGATIVE   Leuk Esterase: NEGATIVE / RBC: x / WBC x   Sq Epi: x / Non Sq Epi: x / Bacteria: x        MICROBIOLOGY: pending    RADIOLOGY & ADDITIONAL STUDIES: pending

## 2023-02-23 NOTE — BH CONSULTATION LIAISON PROGRESS NOTE - NSBHASSESSMENTFT_PSY_ALL_CORE
Patient is a 78 year old male with history of substance use and no significant past medical history (poor medical follow up, last PCP visit 20 years prior to admission), presenting with unresponsiveness from nursing home to [facility], found to be in Aflutter w RVR with subsegmental PE RUL, transferred to [facility], found to have LV thrombus and worsening HF, EF 10-15%. 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 Pt currently s/p BKA. Psychiatry consulted due to chronic depressed mood in the hospital and suicidality. On repeated assessments pt acknowledged low mood and hopelessness in context of his current medical complications and loss of independence but denied any active SI.   On re-assessment today he continues to deny SI, presents with fatigue due to his current medical complications (fever) and mood fluctuations.     Plan:  -patient does not require 1:1 observation for acute suicidality  -continue mirtazapine 30mg QHS for ongoing depressive sx and insomnia  -Encourage sleep hygiene and limiting sleep during the day    -Delirium precautions  -Psychology to follow the pt daily for supportive psychotherapy

## 2023-02-23 NOTE — PROGRESS NOTE ADULT - ASSESSMENT
77M with PMHx of IVDU found unresponsive at his nursing home, resolved after Narcan in the field and brought to [facility]. Found to have PE, atrial flutter, and large LV thrombus on echo. Transferred to [facility] for further management. Pt c/o abdominal pain on 12/10 CTA showing mid SMA with embolus. Abnormal distal small bowel loops and cecum with dilatation and pneumatosis suggesting infarcted bowel. One or two tiny foci of intrahepatic portal vein pneumatosis. Segmental infarction upper pole left kidney. Now s/p Ex lap, SMA embolectomy, 80cm SBR, abthera vac left in discontinuity (12/11) and transferred to SICU intubated. S/p second look (12/13) and most recently s/p OR for 3rd look, end-to-end anastomosis of remaining bowel, loop ileostomy and abdomen closure (12/15). S/p LORENZO and Cardioversion on 12/30 with cardiology. LLE with AKA delayed by nutritional optimization. 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 Now on telemetry.      -Tylenol and Oxycodone PRN.   -Regular diet, IVF  -Replete ostomy output prn   -Continue Immodium/Tincture of Opium.  -LV thrombus w/ LLE ischemia s/p AKA 02/15  -Erta( 2/13-2/24)  (Dapto 2/11-2/24)    -Undrainable abscess in Abd  -Continue metoprolol/amiodarone   -Holding spironolactone, entresto   -SCDs, SQL 65BID.   -f/u PT recs s/p amputation   -Dispo: ROWAN

## 2023-02-23 NOTE — PROGRESS NOTE ADULT - SUBJECTIVE AND OBJECTIVE BOX
SUBJECTIVE:  Doing well this AM. NAEON. Denies n/v. Endorses f/bm. Denies cp/sob. Pain is well controlled. Activity as tolerated w/ PT. Tolerating diet.    MEDICATIONS  (STANDING):  aMIOdarone    Tablet 100 milliGRAM(s) Oral every 24 hours  ascorbic acid 500 milliGRAM(s) Oral daily  chlorhexidine 2% Cloths 1 Application(s) Topical daily  cholestyramine Powder (Sugar-Free) 2 Gram(s) Oral three times a day  DAPTOmycin IVPB 500 milliGRAM(s) IV Intermittent every 24 hours  dextrose 5% + lactated ringers. 1000 milliLiter(s) (100 mL/Hr) IV Continuous <Continuous>  diphenoxylate/atropine 2 Tablet(s) Oral every 8 hours  enoxaparin Injectable 65 milliGRAM(s) SubCutaneous every 12 hours  ertapenem  IVPB 1000 milliGRAM(s) IV Intermittent every 24 hours  fat emulsion (Fish Oil and Plant Based) 20% Infusion 0.7764 Gm/kG/Day (20.83 mL/Hr) IV Continuous <Continuous>  influenza  Vaccine (HIGH DOSE) 0.7 milliLiter(s) IntraMuscular once  loperamide 4 milliGRAM(s) Oral every 8 hours  metoprolol succinate ER 25 milliGRAM(s) Oral daily  mirtazapine 30 milliGRAM(s) Oral at bedtime  multivitamin 1 Tablet(s) Oral daily  opium Tincture 6 milliGRAM(s) Oral every 12 hours  pantoprazole    Tablet 40 milliGRAM(s) Oral two times a day  Parenteral Nutrition - Adult 1 Each (83 mL/Hr) TPN Continuous <Continuous>  Parenteral Nutrition - Adult 1 Each (83 mL/Hr) TPN Continuous <Continuous>  povidone iodine 10% Solution 1 Application(s) Topical daily  psyllium Powder 1 Packet(s) Oral every 8 hours  zinc sulfate 220 milliGRAM(s) Oral daily    MEDICATIONS  (PRN):  acetaminophen     Tablet .. 650 milliGRAM(s) Oral every 6 hours PRN Temp greater or equal to 38C (100.4F), Mild Pain (1 - 3)  melatonin 3 milliGRAM(s) Oral at bedtime PRN Insomnia  ondansetron Injectable 4 milliGRAM(s) IV Push every 6 hours PRN Nausea and/or Vomiting  oxyCODONE    IR 5 milliGRAM(s) Oral every 4 hours PRN Severe Pain (7 - 10)  sodium chloride 0.9% lock flush 10 milliLiter(s) IV Push every 1 hour PRN Pre/post blood products, medications, blood draw, and to maintain line patency      Vital Signs Last 24 Hrs  T(C): 37.6 (2023 14:00), Max: 38.9 (2023 04:00)  T(F): 99.6 (2023 14:00), Max: 102.1 (2023 06:33)  HR: 104 (2023 16:08) (92 - 114)  BP: 131/64 (2023 16:08) (98/52 - 136/71)  BP(mean): 90 (2023 16:08) (71 - 94)  RR: 17 (2023 13:03) (16 - 17)  SpO2: 98% (2023 16:08) (98% - 100%)    Parameters below as of 2023 14:05  Patient On (Oxygen Delivery Method): room air        Physical Exam:  General: NAD, resting comfortably in bed  Pulmonary: Nonlabored breathing, no respiratory distress  Cardiovascular: NSR  Abdominal: soft, NT/ND  Extremities: WWP, normal strength, L AKA site clean and intact w/o odor/purulence/erythema  Neuro: A/O x 3, CNs II-XII grossly intact, no focal deficits    I&O's Summary    2023 07:  -  2023 07:00  --------------------------------------------------------  IN: 4615 mL / OUT: 4375 mL / NET: 240 mL    2023 07:01  -  2023 16:31  --------------------------------------------------------  IN: 2913.2 mL / OUT: 2000 mL / NET: 913.2 mL        LABS:                        7.6    11.25 )-----------( 385      ( 2023 05:30 )             23.2         142  |  109<H>  |  7   ----------------------------<  139<H>  3.1<L>   |  26  |  0.54    Ca    7.9<L>      2023 05:30  Phos  3.5       Mg     1.6           PT/INR - ( 2023 05:30 )   PT: 15.6 sec;   INR: 1.31          PTT - ( 2023 05:30 )  PTT:33.7 sec  Urinalysis Basic - ( 2023 08:44 )    Color: Yellow / Appearance: Clear / S.015 / pH: x  Gluc: x / Ketone: NEGATIVE  / Bili: Negative / Urobili: 0.2 E.U./dL   Blood: x / Protein: NEGATIVE mg/dL / Nitrite: NEGATIVE   Leuk Esterase: NEGATIVE / RBC: x / WBC x   Sq Epi: x / Non Sq Epi: x / Bacteria: x

## 2023-02-23 NOTE — BH CONSULTATION LIAISON PROGRESS NOTE - NSBHFUPINTERVALHXFT_PSY_A_CORE
Upon approach, pt was asleep but easily awoken by verbal prompting. Pt reported feeling "not so good" because he is cold, shivering, and has a fever. He reported his mood as "neutral." He reported that he was upset earlier about not being offered breakfast this morning (pt is currently NPO in the context of upcoming surgical procedure on amputated limb). Pt was pleasant, though appeared weak and tired and was having difficulty engaging in session, stated that he was going to try to sleep. Pt appeared to have some difficulty with attention as well, or some slight impairment in short-term memory (e.g., repeatedly asked for a thicker blanket even after writer explained why he could not have one due to fever; asked for food after writer explained he's NPO; no indication that pt was disputing or challenging the protocols).

## 2023-02-23 NOTE — PROGRESS NOTE ADULT - SUBJECTIVE AND OBJECTIVE BOX
SUBJECTIVE: Patient seen and examined bedside by surgery team. Patient denies any acute complaints. -F/C/CP/SOB/N/V    aMIOdarone    Tablet 100 milliGRAM(s) Oral every 24 hours  enoxaparin Injectable 65 milliGRAM(s) SubCutaneous every 12 hours  metoprolol succinate ER 25 milliGRAM(s) Oral daily      Vital Signs Last 24 Hrs  T(C): 38.7 (2023 17:33), Max: 38.9 (2023 04:00)  T(F): 101.6 (2023 17:33), Max: 102.1 (2023 06:33)  HR: 104 (2023 16:08) (92 - 114)  BP: 131/64 (2023 16:08) (98/52 - 136/71)  BP(mean): 90 (2023 16:08) (71 - 94)  RR: 17 (2023 13:03) (16 - 17)  SpO2: 98% (2023 16:08) (98% - 100%)    Parameters below as of 2023 14:05  Patient On (Oxygen Delivery Method): room air      I&O's Detail    2023 07:01  -  2023 07:00  --------------------------------------------------------  IN:    dextrose 5% + lactated ringers: 1700 mL    Fat Emulsion (Fish Oil &amp; Plant Based) 20% Infusion: 104 mL    Lactated Ringers Bolus: 1100 mL    PRBCs (Packed Red Blood Cells): 300 mL    TPN (Total Parenteral Nutrition): 1411 mL  Total IN: 4615 mL    OUT:    Ileostomy (mL): 2375 mL    Voided (mL): 2000 mL  Total OUT: 4375 mL    Total NET: 240 mL      2023 07:01  -  2023 17:48  --------------------------------------------------------  IN:    dextrose 5% + lactated ringers: 900 mL    Fat Emulsion (Fish Oil &amp; Plant Based) 20% Infusion: 83.2 mL    IV PiggyBack: 100 mL    Lactated Ringers Bolus: 300 mL    Oral Fluid: 700 mL    TPN (Total Parenteral Nutrition): 830 mL  Total IN: 2913.2 mL    OUT:    Ileostomy (mL): 1150 mL    Voided (mL): 850 mL  Total OUT: 2000 mL    Total NET: 913.2 mL          General: NAD, resting comfortably in bed  C/V: NSR  Pulm: Nonlabored breathing, no respiratory distress  Abd: soft, NT/ND. Ostomy with stool and gas in bag.   Extrem: WWP, no edema, SCDs in place        LABS:                        7.6    11.25 )-----------( 385      ( 2023 05:30 )             23.2     02-    142  |  109<H>  |  7   ----------------------------<  139<H>  3.1<L>   |  26  |  0.54    Ca    7.9<L>      2023 05:30  Phos  3.5       Mg     1.6     23      PT/INR - ( 2023 05:30 )   PT: 15.6 sec;   INR: 1.31          PTT - ( 2023 05:30 )  PTT:33.7 sec  Urinalysis Basic - ( 2023 08:44 )    Color: Yellow / Appearance: Clear / S.015 / pH: x  Gluc: x / Ketone: NEGATIVE  / Bili: Negative / Urobili: 0.2 E.U./dL   Blood: x / Protein: NEGATIVE mg/dL / Nitrite: NEGATIVE   Leuk Esterase: NEGATIVE / RBC: x / WBC x   Sq Epi: x / Non Sq Epi: x / Bacteria: x        RADIOLOGY & ADDITIONAL STUDIES:

## 2023-02-24 NOTE — PROGRESS NOTE ADULT - SUBJECTIVE AND OBJECTIVE BOX
INTERVAL HPI/OVERNIGHT EVENTS: Ongoing fevers/rigors.    CONSTITUTIONAL:  Negative fever or chills, feels well, good appetite  EYES:  Negative  blurry vision or double vision  CARDIOVASCULAR:  Negative for chest pain or palpitations  RESPIRATORY:  Negative for cough, wheezing, or SOB   GASTROINTESTINAL:  Negative for nausea, vomiting, diarrhea, constipation, or abdominal pain  GENITOURINARY:  Negative frequency, urgency or dysuria  NEUROLOGIC:  No headache, confusion, dizziness, lightheadedness      ANTIBIOTICS/RELEVANT:    MEDICATIONS  (STANDING):  acetaminophen   IVPB .. 1000 milliGRAM(s) IV Intermittent once  aMIOdarone    Tablet 100 milliGRAM(s) Oral every 24 hours  ascorbic acid 500 milliGRAM(s) Oral daily  chlorhexidine 2% Cloths 1 Application(s) Topical daily  cholestyramine Powder (Sugar-Free) 2 Gram(s) Oral three times a day  DAPTOmycin IVPB 500 milliGRAM(s) IV Intermittent every 24 hours  dextrose 5% + lactated ringers. 1000 milliLiter(s) (100 mL/Hr) IV Continuous <Continuous>  diphenoxylate/atropine 2 Tablet(s) Oral every 8 hours  enoxaparin Injectable 65 milliGRAM(s) SubCutaneous every 12 hours  ertapenem  IVPB 1000 milliGRAM(s) IV Intermittent every 24 hours  fat emulsion (Fish Oil and Plant Based) 20% Infusion 0.7764 Gm/kG/Day (20.83 mL/Hr) IV Continuous <Continuous>  influenza  Vaccine (HIGH DOSE) 0.7 milliLiter(s) IntraMuscular once  lactated ringers Bolus 550 milliLiter(s) IV Bolus once  loperamide 4 milliGRAM(s) Oral every 8 hours  metoprolol succinate ER 25 milliGRAM(s) Oral daily  mirtazapine 30 milliGRAM(s) Oral at bedtime  multivitamin 1 Tablet(s) Oral daily  opium Tincture 6 milliGRAM(s) Oral every 12 hours  pantoprazole    Tablet 40 milliGRAM(s) Oral two times a day  Parenteral Nutrition - Adult 1 Each (83 mL/Hr) TPN Continuous <Continuous>  Parenteral Nutrition - Adult 1 Each (83 mL/Hr) TPN Continuous <Continuous>  povidone iodine 10% Solution 1 Application(s) Topical daily  psyllium Powder 1 Packet(s) Oral every 8 hours  zinc sulfate 220 milliGRAM(s) Oral daily    MEDICATIONS  (PRN):  acetaminophen     Tablet .. 650 milliGRAM(s) Oral every 6 hours PRN Temp greater or equal to 38C (100.4F), Mild Pain (1 - 3)  melatonin 3 milliGRAM(s) Oral at bedtime PRN Insomnia  ondansetron Injectable 4 milliGRAM(s) IV Push every 6 hours PRN Nausea and/or Vomiting  sodium chloride 0.9% lock flush 10 milliLiter(s) IV Push every 1 hour PRN Pre/post blood products, medications, blood draw, and to maintain line patency        Vital Signs Last 24 Hrs  T(C): 37.6 (24 Feb 2023 13:59), Max: 38.7 (23 Feb 2023 17:33)  T(F): 99.6 (24 Feb 2023 13:59), Max: 101.7 (23 Feb 2023 21:32)  HR: 92 (24 Feb 2023 11:55) (88 - 112)  BP: 159/67 (24 Feb 2023 11:55) (124/61 - 159/67)  BP(mean): 97 (24 Feb 2023 11:55) (84 - 97)  RR: 19 (24 Feb 2023 11:55) (17 - 19)  SpO2: 100% (24 Feb 2023 11:55) (98% - 100%)    Parameters below as of 24 Feb 2023 11:55  Patient On (Oxygen Delivery Method): room air        PHYSICAL EXAM:  Constitutional: NAD  Eyes: JOHN, EOMI  Ear/Nose/Throat: no oral lesion, no sinus tenderness on percussion	  Neck: no JVD, no lymphadenopathy, supple  Respiratory: CTA keerthi  Cardiovascular: S1S2 RRR, no murmurs  Gastrointestinal:soft, (+) BS, no HSM  Extremities:no e/e/c  Vascular: DP Pulse:	right normal; left normal      LABS:                        7.6    10.21 )-----------( 366      ( 24 Feb 2023 08:29 )             24.1     02-24    142  |  107  |  8   ----------------------------<  131<H>  3.4<L>   |  27  |  0.51    Ca    8.1<L>      24 Feb 2023 08:29  Phos  3.3     02-24  Mg     1.7     02-24            MICROBIOLOGY: Culture - Blood in AM (02.23.23 @ 05:30)    Specimen Source: .Blood Blood-Venous    Culture Results:   No growth at 1 day.        RADIOLOGY & ADDITIONAL STUDIES: pending

## 2023-02-24 NOTE — PROGRESS NOTE ADULT - ASSESSMENT
77M first presented to [facility] from [facility] due to unresponsiveness (responded to Narcan). At [facility], found to have subsegmental pulmonary embolism in RUL, rapid atrial flutter with severely reduced LVEF with LV thrombus. Transferred to [facility] on 12/7/22 for LORENZO and possible ablation. At [facility], was found to have left leg ischemia (left leg arterial thrombus on LE duplex on 12/8). Was being considered for rhythm control when he developed abdominal pain, CTA (12/10/22) showed embolus in SMA, bowel infarct, requiring ex-lap on 12/11 with SMA embolectomy, 80cm small bowel resection; On 12/13, underwent 2nd look laparotomy, with 80cm small bowel resection, closure with abthera. On 12/15, underwent 3rd look laparotomy, end-to-end anastomosis of remaining bowel, loop ileostomy and abdominal closure. Eventually underwent LORENZO/DCCV on 12/30/22, now in sinus rhythm. More recently found to have k. 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     #bacteremia - k pneumoniae  -cultures cleared, on erta/dapto - but still continues to have fever over night  - reach out to ID again.  Likely due to intraabdominal fluid collection.  Awaiting repeat CT scan.  Blood cultures were taken and results pending.    -management per ID. If fevers become even more persistent, then may have to discuss removing PICC line.  Patient is at risk for candidal infection due to being on TPN.    -abscess not drainable per IR  -PICC placed today    #s/p left AKA  management per primary team/ vascular  - still needs to be closed    #anemia  - hemoglobin improved following transfusion of PRBCs yesterday  no e/o active bleeding - transfusing and repeating post-transfusion CBC  - he has been intermittently tachycardic for entire hospitalization  - etiology of anemia likely multifactorial    #Suicidal Ideation  #Insomnia   being seen by psych, appreciate input  - does not require SI precautions per last psychiatry note  - Continue mirtazapine 30mg QHS     # Acute Systolic Heart Failure   - Metoprolol succinate 25mg qd - continue  - does not appear volume overloaded at this time.  Lies on his back, and no shortness of breath on exam.      # Atrial Flutter  - s/p DCCV 12/30  - EP recs - uninterrupted AC x 30 days ; January 29th was 30 days post DCCV.   -Continue Lovenox, amiodarone and metoprolol succinate    # Pulmonary embolism (subsegmental RUL)   - After completion of AC for DCCV, would need to continue AC for PE - currently continuing lovenox    # Bowel ischemia - s/p SMA embolectomy; Small bowel resection  - ileostomy in place  - plan of primary team  - loperamide and diphenoxylate atropine    # Severe protein-calorie malnutrition  # Sacral wound   - appreciate input from nutrition  - Wound care per nursing    #hypokalemia  - replete  - repeat labs tomorrow    #DVT ppx - continue lovenox for PE     >35 minutes spent on this encounter, including face to face with patient, care coordination and documentation.   Plan of care discussed with primary team

## 2023-02-24 NOTE — PROGRESS NOTE ADULT - SUBJECTIVE AND OBJECTIVE BOX
Denies any pain.  He is frustrated because he keeps having fevers.  Having stool output in rectal tube.      Remaining ROS negative     PHYSICAL EXAM:    General: fatigued appearing, but in no acute distress  HEENT: NC/AT; anicteric sclera, slightly dry mucous membranes  Cardiovascular: +S1/S2, RRR, no murmurs, rubs or gallops  Respiratory: diminished breath sounds at bases, no crackles, rales or rhonchi apparent  Gastrointestinal: soft, NT/ND; +BSx4  Extremities: RLE wwp, s/p L AKA  Neurological: speech is fluent, no asymmetry, follows commands    VITAL SIGNS:  Vital Signs Last 24 Hrs  T(C): 36.8 (24 Feb 2023 09:21), Max: 38.7 (23 Feb 2023 17:33)  T(F): 98.3 (24 Feb 2023 09:21), Max: 101.7 (23 Feb 2023 21:32)  HR: 92 (24 Feb 2023 11:55) (88 - 112)  BP: 159/67 (24 Feb 2023 11:55) (124/61 - 159/67)  BP(mean): 97 (24 Feb 2023 11:55) (84 - 97)  RR: 19 (24 Feb 2023 11:55) (17 - 19)  SpO2: 100% (24 Feb 2023 11:55) (98% - 100%)    Parameters below as of 24 Feb 2023 11:55  Patient On (Oxygen Delivery Method): room air          MEDICATIONS:  MEDICATIONS  (STANDING):  aMIOdarone    Tablet 100 milliGRAM(s) Oral every 24 hours  ascorbic acid 500 milliGRAM(s) Oral daily  chlorhexidine 2% Cloths 1 Application(s) Topical daily  cholestyramine Powder (Sugar-Free) 2 Gram(s) Oral three times a day  DAPTOmycin IVPB 500 milliGRAM(s) IV Intermittent every 24 hours  dextrose 5% + lactated ringers. 1000 milliLiter(s) (100 mL/Hr) IV Continuous <Continuous>  diphenoxylate/atropine 2 Tablet(s) Oral every 8 hours  enoxaparin Injectable 65 milliGRAM(s) SubCutaneous every 12 hours  ertapenem  IVPB 1000 milliGRAM(s) IV Intermittent every 24 hours  fat emulsion (Fish Oil and Plant Based) 20% Infusion 0.7764 Gm/kG/Day (20.83 mL/Hr) IV Continuous <Continuous>  influenza  Vaccine (HIGH DOSE) 0.7 milliLiter(s) IntraMuscular once  lactated ringers Bolus 550 milliLiter(s) IV Bolus once  loperamide 4 milliGRAM(s) Oral every 8 hours  metoprolol succinate ER 25 milliGRAM(s) Oral daily  mirtazapine 30 milliGRAM(s) Oral at bedtime  multivitamin 1 Tablet(s) Oral daily  opium Tincture 6 milliGRAM(s) Oral every 12 hours  pantoprazole    Tablet 40 milliGRAM(s) Oral two times a day  Parenteral Nutrition - Adult 1 Each (83 mL/Hr) TPN Continuous <Continuous>  Parenteral Nutrition - Adult 1 Each (83 mL/Hr) TPN Continuous <Continuous>  povidone iodine 10% Solution 1 Application(s) Topical daily  psyllium Powder 1 Packet(s) Oral every 8 hours  zinc sulfate 220 milliGRAM(s) Oral daily    MEDICATIONS  (PRN):  acetaminophen     Tablet .. 650 milliGRAM(s) Oral every 6 hours PRN Temp greater or equal to 38C (100.4F), Mild Pain (1 - 3)  melatonin 3 milliGRAM(s) Oral at bedtime PRN Insomnia  ondansetron Injectable 4 milliGRAM(s) IV Push every 6 hours PRN Nausea and/or Vomiting  sodium chloride 0.9% lock flush 10 milliLiter(s) IV Push every 1 hour PRN Pre/post blood products, medications, blood draw, and to maintain line patency      ALLERGIES:  Allergies    No Known Allergies    Intolerances        LABS:                        7.6    10.21 )-----------( 366      ( 24 Feb 2023 08:29 )             24.1     02-24    142  |  107  |  8   ----------------------------<  131<H>  3.4<L>   |  27  |  0.51    Ca    8.1<L>      24 Feb 2023 08:29  Phos  3.3     02-24  Mg     1.7     02-24          CAPILLARY BLOOD GLUCOSE          RADIOLOGY & ADDITIONAL TESTS: Reviewed.

## 2023-02-24 NOTE — CHART NOTE - NSCHARTNOTEFT_GEN_A_CORE
The writer met with the patient for f/u individual psychotherapy. The patient expressed some hopelessness and frustration, though overall his condition appears stable. The writer engaged the patient in discussion on his response to his current hospitalization as well as historical and current events. No SHIIPAVH reported. The patient was meaningfully engaged and receptive to these interventions. The patient ended the session in good behavioral and emotional control.     Risks assessment: low acute (multiple protective factors, including ongoing future oriented thinking, motivation to improve, resilience).

## 2023-02-24 NOTE — PROGRESS NOTE ADULT - SUBJECTIVE AND OBJECTIVE BOX
SUBJECTIVE:  Patient seen and examined on AM rounds with chief resident. Febrile overnight, TMax 101.7. This AM, patient is feeling ok. No complaints at this time. Tolerating regular diet, continues to receive TPN. Voiding without issue. Continues to have ileostomy output.     MEDICATIONS  (STANDING):  aMIOdarone    Tablet 100 milliGRAM(s) Oral every 24 hours  ascorbic acid 500 milliGRAM(s) Oral daily  chlorhexidine 2% Cloths 1 Application(s) Topical daily  cholestyramine Powder (Sugar-Free) 2 Gram(s) Oral three times a day  DAPTOmycin IVPB 500 milliGRAM(s) IV Intermittent every 24 hours  dextrose 5% + lactated ringers. 1000 milliLiter(s) (100 mL/Hr) IV Continuous <Continuous>  diphenoxylate/atropine 2 Tablet(s) Oral every 8 hours  enoxaparin Injectable 65 milliGRAM(s) SubCutaneous every 12 hours  ertapenem  IVPB 1000 milliGRAM(s) IV Intermittent every 24 hours  fat emulsion (Fish Oil and Plant Based) 20% Infusion 0.7764 Gm/kG/Day (20.83 mL/Hr) IV Continuous <Continuous>  influenza  Vaccine (HIGH DOSE) 0.7 milliLiter(s) IntraMuscular once  loperamide 4 milliGRAM(s) Oral every 8 hours  metoprolol succinate ER 25 milliGRAM(s) Oral daily  mirtazapine 30 milliGRAM(s) Oral at bedtime  multivitamin 1 Tablet(s) Oral daily  opium Tincture 6 milliGRAM(s) Oral every 12 hours  pantoprazole    Tablet 40 milliGRAM(s) Oral two times a day  Parenteral Nutrition - Adult 1 Each (83 mL/Hr) TPN Continuous <Continuous>  povidone iodine 10% Solution 1 Application(s) Topical daily  psyllium Powder 1 Packet(s) Oral every 8 hours  zinc sulfate 220 milliGRAM(s) Oral daily    MEDICATIONS  (PRN):  acetaminophen     Tablet .. 650 milliGRAM(s) Oral every 6 hours PRN Temp greater or equal to 38C (100.4F), Mild Pain (1 - 3)  melatonin 3 milliGRAM(s) Oral at bedtime PRN Insomnia  ondansetron Injectable 4 milliGRAM(s) IV Push every 6 hours PRN Nausea and/or Vomiting  sodium chloride 0.9% lock flush 10 milliLiter(s) IV Push every 1 hour PRN Pre/post blood products, medications, blood draw, and to maintain line patency      Vital Signs Last 24 Hrs  T(C): 37.4 (2023 04:51), Max: 38.7 (2023 17:33)  T(F): 99.4 (2023 04:51), Max: 101.7 (2023 21:32)  HR: 104 (2023 23:45) (92 - 112)  BP: 132/62 (2023 23:45) (124/61 - 136/71)  BP(mean): 86 (2023 23:45) (84 - 94)  RR: 17 (2023 20:59) (17 - 18)  SpO2: 100% (2023 23:45) (98% - 100%)    Parameters below as of 2023 20:59  Patient On (Oxygen Delivery Method): room air    Physical Exam:  General: NAD, resting comfortably in bed  Pulmonary: Nonlabored breathing, no respiratory distress  Cardiovascular: NSR  Abdominal: soft, NT/ND, ileostomy with stool output  Extremities: WWP, normal strength    I&O's Summary    2023 07:01  -  2023 07:00  --------------------------------------------------------  IN: 4581.2 mL / OUT: 5675 mL / NET: -1093.8 mL        LABS:                        7.6    11.25 )-----------( 385      ( 2023 05:30 )             23.2     02-23    142  |  109<H>  |  7   ----------------------------<  139<H>  3.1<L>   |  26  |  0.54    Ca    7.9<L>      2023 05:30  Phos  3.5     02  Mg     1.6     23        Urinalysis Basic - ( 2023 08:44 )    Color: Yellow / Appearance: Clear / S.015 / pH: x  Gluc: x / Ketone: NEGATIVE  / Bili: Negative / Urobili: 0.2 E.U./dL   Blood: x / Protein: NEGATIVE mg/dL / Nitrite: NEGATIVE   Leuk Esterase: NEGATIVE / RBC: x / WBC x   Sq Epi: x / Non Sq Epi: x / Bacteria: x

## 2023-02-24 NOTE — PROGRESS NOTE ADULT - ASSESSMENT
78M with PMHx of IVDU found unresponsive at his nursing home, resolved after Narcan in the field and brought to [facility]. Found to have PE, atrial flutter, and large LV thrombus on ECHOand CTA showing mid SMA with embolus s/p Ex lap, SMA embolectomy, 80cm SBR, abthera vac left in discontinuity (12/11) and transferred to SICU intubated. S/p second look (12/13) and most recently s/p OR for 3rd look, end-to-end anastomosis of remaining bowel, loop ileostomy and abdomen closure (12/15). Prev stepped down to telemetry. LLE ischemia, AKA delayed by nutritional optimization. Transferred to SICU in the setting of sepsis for HD monitoring. 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    - Maintain surgical dressing, changed today, can reinforce as needed  - Will discuss RTOR plan when medically stable and afebrile  - Remainder of care per primary team  - Vascular will continue to follow

## 2023-02-24 NOTE — PROGRESS NOTE ADULT - ASSESSMENT
77M with PMHx of IVDU found unresponsive at his nursing home, resolved after Narcan in the field and brought to [facility]. Found to have PE, atrial flutter, and large LV thrombus on echo. Transferred to [facility] for further management. Pt c/o abdominal pain on 12/10 CTA showing mid SMA with embolus. Abnormal distal small bowel loops and cecum with dilatation and pneumatosis suggesting infarcted bowel. One or two tiny foci of intrahepatic portal vein pneumatosis. Segmental infarction upper pole left kidney. Now s/p Ex lap, SMA embolectomy, 80cm SBR, abthera vac left in discontinuity (12/11) and transferred to SICU intubated. S/p second look (12/13) and most recently s/p OR for 3rd look, end-to-end anastomosis of remaining bowel, loop ileostomy and abdomen closure (12/15). S/p LORENZO and Cardioversion on 12/30 with cardiology. LLE with AKA delayed by nutritional optimization. 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 Now on telemetry. s/p picc 2/20.     -Tylenol and Oxycodone PRN  -Regular diet with TPN  -Replete ostomy output q6hr  -Continue Immodium/Tincture of Opium.  -LV thrombus w/ LLE ischemia s/p AKA 02/15, pending closure   -Erta( 2/13-2/24)  (Dapto 2/11-2/24)    -picc 2/20   -Undrainable abscess in Abd  -Continue metoprolol/amiodarone   -Holding spironolactone, entresto   -SCDs, SQL 65BID.   -f/u PT recs s/p amputation   -Dispo: ROWAN

## 2023-02-24 NOTE — PROGRESS NOTE ADULT - SUBJECTIVE AND OBJECTIVE BOX
SUBJECTIVE:  Doing well this AM. NAEON. Denies n/v. Endorses f/bm. Denies cp/sob. Pain is well controlled. Activity w/ PT. Tolerating diet.    MEDICATIONS  (STANDING):  aMIOdarone    Tablet 100 milliGRAM(s) Oral every 24 hours  ascorbic acid 500 milliGRAM(s) Oral daily  chlorhexidine 2% Cloths 1 Application(s) Topical daily  cholestyramine Powder (Sugar-Free) 2 Gram(s) Oral three times a day  DAPTOmycin IVPB 500 milliGRAM(s) IV Intermittent every 24 hours  dextrose 5% + lactated ringers. 1000 milliLiter(s) (100 mL/Hr) IV Continuous <Continuous>  diphenoxylate/atropine 2 Tablet(s) Oral every 8 hours  enoxaparin Injectable 65 milliGRAM(s) SubCutaneous every 12 hours  ertapenem  IVPB 1000 milliGRAM(s) IV Intermittent every 24 hours  fat emulsion (Fish Oil and Plant Based) 20% Infusion 0.7764 Gm/kG/Day (20.83 mL/Hr) IV Continuous <Continuous>  fat emulsion (Fish Oil and Plant Based) 20% Infusion 0.7764 Gm/kG/Day (20.8 mL/Hr) IV Continuous <Continuous>  influenza  Vaccine (HIGH DOSE) 0.7 milliLiter(s) IntraMuscular once  loperamide 4 milliGRAM(s) Oral every 8 hours  metoprolol succinate ER 25 milliGRAM(s) Oral daily  mirtazapine 30 milliGRAM(s) Oral at bedtime  multivitamin 1 Tablet(s) Oral daily  opium Tincture 6 milliGRAM(s) Oral every 12 hours  pantoprazole    Tablet 40 milliGRAM(s) Oral two times a day  Parenteral Nutrition - Adult 1 Each (83 mL/Hr) TPN Continuous <Continuous>  Parenteral Nutrition - Adult 1 Each (83 mL/Hr) TPN Continuous <Continuous>  povidone iodine 10% Solution 1 Application(s) Topical daily  psyllium Powder 1 Packet(s) Oral every 8 hours  zinc sulfate 220 milliGRAM(s) Oral daily    MEDICATIONS  (PRN):  acetaminophen     Tablet .. 650 milliGRAM(s) Oral every 6 hours PRN Temp greater or equal to 38C (100.4F), Mild Pain (1 - 3)  melatonin 3 milliGRAM(s) Oral at bedtime PRN Insomnia  ondansetron Injectable 4 milliGRAM(s) IV Push every 6 hours PRN Nausea and/or Vomiting  sodium chloride 0.9% lock flush 10 milliLiter(s) IV Push every 1 hour PRN Pre/post blood products, medications, blood draw, and to maintain line patency      Vital Signs Last 24 Hrs  T(C): 36.8 (24 Feb 2023 09:21), Max: 38.7 (23 Feb 2023 17:33)  T(F): 98.3 (24 Feb 2023 09:21), Max: 101.7 (23 Feb 2023 21:32)  HR: 88 (24 Feb 2023 08:55) (88 - 112)  BP: 131/65 (24 Feb 2023 08:55) (124/61 - 136/71)  BP(mean): 90 (24 Feb 2023 08:55) (84 - 94)  RR: 18 (24 Feb 2023 08:55) (17 - 18)  SpO2: 100% (24 Feb 2023 08:55) (98% - 100%)    Parameters below as of 24 Feb 2023 08:55  Patient On (Oxygen Delivery Method): room air        Physical Exam:  General: NAD, resting comfortably in bed  Pulmonary: Nonlabored breathing, no respiratory distress  Cardiovascular: NSR  Abdominal: soft, NT/ND  Extremities: WWP, normal strength, L AKA site clean and intact w/o odor/purulence/erythema  Neuro: A/O x 3, CNs II-XII grossly intact, no focal deficits    I&O's Summary    23 Feb 2023 07:01  -  24 Feb 2023 07:00  --------------------------------------------------------  IN: 4581.2 mL / OUT: 5675 mL / NET: -1093.8 mL        LABS:                        7.6    10.21 )-----------( 366      ( 24 Feb 2023 08:29 )             24.1     02-24    142  |  107  |  8   ----------------------------<  131<H>  3.4<L>   |  27  |  0.51    Ca    8.1<L>      24 Feb 2023 08:29  Phos  3.3     02-24  Mg     1.7     02-24

## 2023-02-24 NOTE — PROGRESS NOTE ADULT - ASSESSMENT
77 yo male with prolonged course c/b mesenteric ischemia s/p SBR and development of RLQ abscess, LLE ischemia s/p L AKA 2/15; VRE faecium and ESBL Klebsiella BSI (enteric napoleon) likely 2/2 undrained intra-abdominal abscess. Now with new fevers (on daptomycin and ertapenem) since 2/21, shortly following RUE PICC placement for TPN on 2/21. At elevated risk of invasive candidiasis i/s/o TPN exposure.   - f/u bcx 2/23  - consider repating bcx 2/24 to increase diagnostic yield (i/s/o rigors)  - low threshold to remove PICC if he has another fever or if bcx returns positive   - advise repeat CT AP with contrast to assess for residual undrained RLQ abscess as a plausible etiology of breakthrough fevers despite targeted antibiotic therapy (see prior CT 2/12 and 1/26)  - advise continue daptomycin 500mg IV q24h plus ertapenem 1g IV q24h

## 2023-02-25 NOTE — PROGRESS NOTE ADULT - ASSESSMENT
77 yo male with prolonged course c/b mesenteric ischemia s/p SBR and development of RLQ abscess, LLE ischemia s/p L AKA 2/15; VRE faecium and ESBL Klebsiella BSI (enteric napoleon) likely 2/2 undrained intra-abdominal abscess. Then with new fevers (on daptomycin and ertapenem) since 2/21, shortly following RUE PICC placement for TPN on 2/21. At elevated risk of invasive candidiasis i/s/o TPN exposure. Fevers apparently resolved after 2/23.  - f/u bcx 2/23--ngtd  - do repeat blood cultures if he has recurrent fever T > 100.9  - low threshold to remove PICC if he has another fever or if bcx returns positive and to repeat CT AP with contrast to assess for residual undrained RLQ abscess as a plausible etiology of breakthrough fevers despite targeted antibiotic therapy (see prior CT 2/12 and 1/26)  - advise complete daptomycin 500mg IV q24h plus ertapenem 1g IV q24h course thru 2/26 followed by observation off antibiotics (see accompanying approval notes)    Please reconsult with ?

## 2023-02-25 NOTE — PROGRESS NOTE ADULT - SUBJECTIVE AND OBJECTIVE BOX
No issues reported overnight.  He does not want any more blood drawn today.     ***Note in progress***    OVERNIGHT EVENTS: NAEO    SUBJECTIVE / INTERVAL HPI: Patient seen and examined at bedside. Patient denying chest pain, SOB, palpitations, cough. Patient denies fever, chills, HA, Dizziness, change in vision/hearing, N/V, abdominal pain, diarrhea, constipation, hematochezia/melena, dysuria, hematuria, new onset weakness/numbness, LE pain and/or swelling.    Remaining ROS negative       PHYSICAL EXAM:    General: WDWN  HEENT: NC/AT; PERRL, anicteric sclera; MMM  Neck: supple  Cardiovascular: +S1/S2, RRR  Respiratory: CTA B/L; no W/R/R  Gastrointestinal: soft, NT/ND; +BSx4  Extremities: WWP; no edema, clubbing or cyanosis  Vascular: 2+ radial, DP/PT pulses B/L  Neurological: AAOx3; no focal deficits  Psychiatric: pleasant mood and affect  Dermatologic: no appreciable wounds or damage to the skin    VITAL SIGNS:  Vital Signs Last 24 Hrs  T(C): 38.8 (26 Feb 2023 05:00), Max: 38.8 (26 Feb 2023 05:00)  T(F): 101.9 (26 Feb 2023 05:00), Max: 101.9 (26 Feb 2023 05:00)  HR: 98 (26 Feb 2023 08:20) (88 - 98)  BP: 104/55 (26 Feb 2023 08:20) (104/55 - 131/60)  BP(mean): 71 (26 Feb 2023 08:20) (71 - 87)  RR: 17 (26 Feb 2023 08:20) (17 - 18)  SpO2: 100% (26 Feb 2023 08:20) (100% - 100%)    Parameters below as of 26 Feb 2023 08:20  Patient On (Oxygen Delivery Method): room air          MEDICATIONS:  MEDICATIONS  (STANDING):  aMIOdarone    Tablet 100 milliGRAM(s) Oral every 24 hours  ascorbic acid 500 milliGRAM(s) Oral daily  chlorhexidine 2% Cloths 1 Application(s) Topical daily  cholestyramine Powder (Sugar-Free) 2 Gram(s) Oral three times a day  DAPTOmycin IVPB 500 milliGRAM(s) IV Intermittent every 24 hours  dextrose 5% + lactated ringers. 1000 milliLiter(s) (100 mL/Hr) IV Continuous <Continuous>  diphenoxylate/atropine 2 Tablet(s) Oral every 8 hours  enoxaparin Injectable 65 milliGRAM(s) SubCutaneous every 12 hours  ertapenem  IVPB 1000 milliGRAM(s) IV Intermittent every 24 hours  fat emulsion (Fish Oil and Plant Based) 20% Infusion 0.774 Gm/kG/Day (20.83 mL/Hr) IV Continuous <Continuous>  influenza  Vaccine (HIGH DOSE) 0.7 milliLiter(s) IntraMuscular once  loperamide 4 milliGRAM(s) Oral every 8 hours  metoprolol succinate ER 25 milliGRAM(s) Oral daily  mirtazapine 30 milliGRAM(s) Oral at bedtime  multivitamin 1 Tablet(s) Oral daily  opium Tincture 6 milliGRAM(s) Oral every 12 hours  pantoprazole    Tablet 40 milliGRAM(s) Oral two times a day  Parenteral Nutrition - Adult 1 Each (83 mL/Hr) TPN Continuous <Continuous>  povidone iodine 10% Solution 1 Application(s) Topical daily  psyllium Powder 1 Packet(s) Oral every 8 hours  zinc sulfate 220 milliGRAM(s) Oral daily    MEDICATIONS  (PRN):  acetaminophen     Tablet .. 650 milliGRAM(s) Oral every 6 hours PRN Temp greater or equal to 38C (100.4F), Mild Pain (1 - 3)  melatonin 3 milliGRAM(s) Oral at bedtime PRN Insomnia  ondansetron Injectable 4 milliGRAM(s) IV Push every 6 hours PRN Nausea and/or Vomiting  sodium chloride 0.9% lock flush 10 milliLiter(s) IV Push every 1 hour PRN Pre/post blood products, medications, blood draw, and to maintain line patency      ALLERGIES:  Allergies    No Known Allergies    Intolerances        LABS:                        7.7    10.85 )-----------( 388      ( 25 Feb 2023 06:20 )             23.2     02-25    138  |  102  |  8   ----------------------------<  130<H>  4.4   |  28  |  0.51    Ca    8.0<L>      25 Feb 2023 06:20  Phos  2.9     02-25  Mg     2.0     02-25          CAPILLARY BLOOD GLUCOSE          RADIOLOGY & ADDITIONAL TESTS: Reviewed.

## 2023-02-25 NOTE — PROGRESS NOTE ADULT - ASSESSMENT
77M first presented to [facility] from [facility] due to unresponsiveness (responded to Narcan). At [facility], found to have subsegmental pulmonary embolism in RUL, rapid atrial flutter with severely reduced LVEF with LV thrombus. Transferred to [facility] on 12/7/22 for LORENZO and possible ablation. At [facility], was found to have left leg ischemia (left leg arterial thrombus on LE duplex on 12/8). Was being considered for rhythm control when he developed abdominal pain, CTA (12/10/22) showed embolus in SMA, bowel infarct, requiring ex-lap on 12/11 with SMA embolectomy, 80cm small bowel resection; On 12/13, underwent 2nd look laparotomy, with 80cm small bowel resection, closure with abthera. On 12/15, underwent 3rd look laparotomy, end-to-end anastomosis of remaining bowel, loop ileostomy and abdominal closure. Eventually underwent LORENZO/DCCV on 12/30/22, now in sinus rhythm. More recently found to have k. 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     #bacteremia - k pneumoniae  -cultures cleared, on erta/dapto - but still continues to have fever over night  - reach out to ID again.  Likely due to intraabdominal fluid collection.  Awaiting repeat CT scan.  Blood cultures were taken and results pending.    -management per ID. If fevers become even more persistent, then may have to discuss removing PICC line.  Patient is at risk for candidal infection due to being on TPN.    -abscess not drainable per IR  -PICC placed today    #s/p left AKA  management per primary team/ vascular  - still needs to be closed    #anemia  - hemoglobin improved following transfusion of PRBCs yesterday  no e/o active bleeding - transfusing and repeating post-transfusion CBC  - he has been intermittently tachycardic for entire hospitalization  - etiology of anemia likely multifactorial    #Suicidal Ideation  #Insomnia   being seen by psych, appreciate input  - does not require SI precautions per last psychiatry note  - Continue mirtazapine 30mg QHS     # Acute Systolic Heart Failure   - Metoprolol succinate 25mg qd - continue  - does not appear volume overloaded at this time.  Lies on his back, and no shortness of breath on exam.      # Atrial Flutter  - s/p DCCV 12/30  - EP recs - uninterrupted AC x 30 days ; January 29th was 30 days post DCCV.   -Continue Lovenox, amiodarone and metoprolol succinate    # Pulmonary embolism (subsegmental RUL)   - After completion of AC for DCCV, would need to continue AC for PE - currently continuing lovenox    # Bowel ischemia - s/p SMA embolectomy; Small bowel resection  - ileostomy in place  - plan of primary team  - loperamide and diphenoxylate atropine    # Severe protein-calorie malnutrition  # Sacral wound   - appreciate input from nutrition  - Wound care per nursing    #hypokalemia  - replete  - repeat labs tomorrow    #DVT ppx - continue lovenox for PE     >35 minutes spent on this encounter, including face to face with patient, care coordination and documentation.   Plan of care discussed with primary team

## 2023-02-25 NOTE — PROGRESS NOTE ADULT - SUBJECTIVE AND OBJECTIVE BOX
STATUS POST:    12/11: Ex lap, SMA embolectomy, 80cm SBR, abthera vac  12/13: Second look, SBR  12/15: Ex-lap, no dead gut, DANIEL of discontinuous gut, loop ileostomy; EBL minimal, 1L crystalloid, UOP 150mL   12/30: LORENZO & Cardioversion on 12/30.   2/15: L AKA jaironine, EBL 10cc, IVF 400cc,     SUBJECTIVE: Patient seen and examined bedside by chief resident. Patient minimally interactive this AM. Appears at baseline.    aMIOdarone    Tablet 100 milliGRAM(s) Oral every 24 hours  DAPTOmycin IVPB 500 milliGRAM(s) IV Intermittent every 24 hours  enoxaparin Injectable 65 milliGRAM(s) SubCutaneous every 12 hours  ertapenem  IVPB 1000 milliGRAM(s) IV Intermittent every 24 hours  metoprolol succinate ER 25 milliGRAM(s) Oral daily      Vital Signs Last 24 Hrs  T(C): 37.6 (25 Feb 2023 10:28), Max: 37.6 (24 Feb 2023 13:59)  T(F): 99.7 (25 Feb 2023 10:28), Max: 99.7 (25 Feb 2023 10:28)  HR: 104 (25 Feb 2023 08:56) (90 - 116)  BP: 128/61 (25 Feb 2023 08:56) (125/57 - 148/68)  BP(mean): 87 (25 Feb 2023 08:56) (82 - 99)  RR: 20 (25 Feb 2023 08:56) (18 - 20)  SpO2: 100% (25 Feb 2023 08:56) (100% - 100%)    Parameters below as of 25 Feb 2023 08:56  Patient On (Oxygen Delivery Method): room air      I&O's Detail    24 Feb 2023 07:01  -  25 Feb 2023 07:00  --------------------------------------------------------  IN:    dextrose 5% + lactated ringers: 2400 mL    Fat Emulsion (Fish Oil &amp; Plant Based) 20% Infusion: 250.8 mL    Lactated Ringers Bolus: 1600 mL    Oral Fluid: 640 mL    TPN (Total Parenteral Nutrition): 1992 mL  Total IN: 6882.8 mL    OUT:    Ileostomy (mL): 3000 mL    Voided (mL): 3800 mL  Total OUT: 6800 mL    Total NET: 82.8 mL      25 Feb 2023 07:01  -  25 Feb 2023 12:47  --------------------------------------------------------  IN:    dextrose 5% + lactated ringers: 100 mL    TPN (Total Parenteral Nutrition): 83 mL  Total IN: 183 mL    OUT:    Voided (mL): 500 mL  Total OUT: 500 mL    Total NET: -317 mL      Neuro: Alert and Oriented X 4. No apparent focal neural deficits  HEENT: NCAT. Mucous membranes moist. EOMI  Respiratory:  No respiratory distress  Cardiovascular: Non tachy/lea  Gastrointestinal: soft, NT/ND. No rebound/guarding                 Ostomy: high output ostomy, p/p/p   Extremities: s/p L AKA gauze c/d/i, R leg in offloading boot        LABS:                        7.7    10.85 )-----------( 388      ( 25 Feb 2023 06:20 )             23.2     02-25    138  |  102  |  8   ----------------------------<  130<H>  4.4   |  28  |  0.51    Ca    8.0<L>      25 Feb 2023 06:20  Phos  2.9     02-25  Mg     2.0     02-25            RADIOLOGY & ADDITIONAL STUDIES:

## 2023-02-25 NOTE — PROGRESS NOTE ADULT - SUBJECTIVE AND OBJECTIVE BOX
24 hr events: ON: Repleted 1L @ 11pm.     SUBJECTIVE: PAtient seen at bedside in no acute distress. Denies any CP, SOB, fevers or chills.    Vital Signs Last 24 Hrs  T(C): 37.6 (25 Feb 2023 10:28), Max: 37.6 (24 Feb 2023 13:59)  T(F): 99.7 (25 Feb 2023 10:28), Max: 99.7 (25 Feb 2023 10:28)  HR: 104 (25 Feb 2023 08:56) (90 - 116)  BP: 128/61 (25 Feb 2023 08:56) (125/57 - 148/68)  BP(mean): 87 (25 Feb 2023 08:56) (82 - 99)  RR: 20 (25 Feb 2023 08:56) (18 - 20)  SpO2: 100% (25 Feb 2023 08:56) (100% - 100%)    Parameters below as of 25 Feb 2023 08:56  Patient On (Oxygen Delivery Method): room air        Physical Exam:  General: NAD, resting comfortably in bed  Pulmonary: Nonlabored breathing, no respiratory distress  Cardiovascular: NSR  Abdominal: soft, NT/ND  Extremities: WWP, normal strength, L AKA site clean and intact w/o odor/purulence/erythema  Neuro: A/O x 3, CNs II-XII grossly intact, no focal deficit  Lines/drains/tubes:    I&O's Summary    24 Feb 2023 07:01  -  25 Feb 2023 07:00  --------------------------------------------------------  IN: 6882.8 mL / OUT: 6800 mL / NET: 82.8 mL    25 Feb 2023 07:01  -  25 Feb 2023 12:43  --------------------------------------------------------  IN: 183 mL / OUT: 500 mL / NET: -317 mL        LABS:                        7.7    10.85 )-----------( 388      ( 25 Feb 2023 06:20 )             23.2     02-25    138  |  102  |  8   ----------------------------<  130<H>  4.4   |  28  |  0.51    Ca    8.0<L>      25 Feb 2023 06:20  Phos  2.9     02-25  Mg     2.0     02-25          CAPILLARY BLOOD GLUCOSE            RADIOLOGY & ADDITIONAL STUDIES:

## 2023-02-25 NOTE — PROGRESS NOTE ADULT - SUBJECTIVE AND OBJECTIVE BOX
INTERVAL HPI/OVERNIGHT EVENTS: No recurrence of fever > 36h.    CONSTITUTIONAL:  Negative fever or chills, feels well, good appetite  EYES:  Negative  blurry vision or double vision  CARDIOVASCULAR:  Negative for chest pain or palpitations  RESPIRATORY:  Negative for cough, wheezing, or SOB   GASTROINTESTINAL:  Negative for nausea, vomiting, diarrhea, constipation, or abdominal pain  GENITOURINARY:  Negative frequency, urgency or dysuria  NEUROLOGIC:  No headache, confusion, dizziness, lightheadedness      ANTIBIOTICS/RELEVANT:    MEDICATIONS  (STANDING):  aMIOdarone    Tablet 100 milliGRAM(s) Oral every 24 hours  ascorbic acid 500 milliGRAM(s) Oral daily  chlorhexidine 2% Cloths 1 Application(s) Topical daily  cholestyramine Powder (Sugar-Free) 2 Gram(s) Oral three times a day  DAPTOmycin IVPB 500 milliGRAM(s) IV Intermittent every 24 hours  dextrose 5% + lactated ringers. 1000 milliLiter(s) (100 mL/Hr) IV Continuous <Continuous>  diphenoxylate/atropine 2 Tablet(s) Oral every 8 hours  enoxaparin Injectable 65 milliGRAM(s) SubCutaneous every 12 hours  ertapenem  IVPB 1000 milliGRAM(s) IV Intermittent every 24 hours  fat emulsion (Fish Oil and Plant Based) 20% Infusion 0.774 Gm/kG/Day (20.83 mL/Hr) IV Continuous <Continuous>  influenza  Vaccine (HIGH DOSE) 0.7 milliLiter(s) IntraMuscular once  loperamide 4 milliGRAM(s) Oral every 8 hours  metoprolol succinate ER 25 milliGRAM(s) Oral daily  mirtazapine 30 milliGRAM(s) Oral at bedtime  multivitamin 1 Tablet(s) Oral daily  opium Tincture 6 milliGRAM(s) Oral every 12 hours  pantoprazole    Tablet 40 milliGRAM(s) Oral two times a day  Parenteral Nutrition - Adult 1 Each (83 mL/Hr) TPN Continuous <Continuous>  Parenteral Nutrition - Adult 1 Each (83 mL/Hr) TPN Continuous <Continuous>  povidone iodine 10% Solution 1 Application(s) Topical daily  psyllium Powder 1 Packet(s) Oral every 8 hours  zinc sulfate 220 milliGRAM(s) Oral daily    MEDICATIONS  (PRN):  acetaminophen     Tablet .. 650 milliGRAM(s) Oral every 6 hours PRN Temp greater or equal to 38C (100.4F), Mild Pain (1 - 3)  melatonin 3 milliGRAM(s) Oral at bedtime PRN Insomnia  ondansetron Injectable 4 milliGRAM(s) IV Push every 6 hours PRN Nausea and/or Vomiting  sodium chloride 0.9% lock flush 10 milliLiter(s) IV Push every 1 hour PRN Pre/post blood products, medications, blood draw, and to maintain line patency        Vital Signs Last 24 Hrs  T(C): 37.5 (25 Feb 2023 14:25), Max: 37.6 (25 Feb 2023 10:28)  T(F): 99.5 (25 Feb 2023 14:25), Max: 99.7 (25 Feb 2023 10:28)  HR: 94 (25 Feb 2023 14:11) (90 - 116)  BP: 128/61 (25 Feb 2023 14:11) (125/57 - 148/68)  BP(mean): 87 (25 Feb 2023 14:11) (82 - 99)  RR: 18 (25 Feb 2023 14:11) (18 - 20)  SpO2: 100% (25 Feb 2023 14:11) (100% - 100%)    Parameters below as of 25 Feb 2023 08:56  Patient On (Oxygen Delivery Method): room air        PHYSICAL EXAM:  Constitutional: NAD  Eyes: JOHN, EOMI  Ear/Nose/Throat: no oral lesion, no sinus tenderness on percussion	  Neck: no JVD, no lymphadenopathy, supple  Respiratory: CTA keerthi  Cardiovascular: S1S2 RRR, no murmurs  Gastrointestinal:soft, (+) BS, no HSM  Extremities:no e/e/c  Vascular: DP Pulse:	right normal; left normal      LABS:                        7.7    10.85 )-----------( 388      ( 25 Feb 2023 06:20 )             23.2     02-25    138  |  102  |  8   ----------------------------<  130<H>  4.4   |  28  |  0.51    Ca    8.0<L>      25 Feb 2023 06:20  Phos  2.9     02-25  Mg     2.0     02-25            MICROBIOLOGY: Culture - Blood in AM (02.23.23 @ 05:30)    Specimen Source: .Blood Blood-Venous    Culture Results:   No growth at 2 days.        RADIOLOGY & ADDITIONAL STUDIES: reviewed

## 2023-02-25 NOTE — PROGRESS NOTE ADULT - ASSESSMENT
77M with PMHx of IVDU found unresponsive at his nursing home, resolved after Narcan in the field and brought to [facility]. Found to have PE, atrial flutter, and large LV thrombus on echo. Transferred to [facility] for further management. Pt c/o abdominal pain on 12/10 CTA showing mid SMA with embolus. Abnormal distal small bowel loops and cecum with dilatation and pneumatosis suggesting infarcted bowel. One or two tiny foci of intrahepatic portal vein pneumatosis. Segmental infarction upper pole left kidney. Now s/p Ex lap, SMA embolectomy, 80cm SBR, abthera vac left in discontinuity (12/11) and transferred to SICU intubated. S/p second look (12/13) and most recently s/p OR for 3rd look, end-to-end anastomosis of remaining bowel, loop ileostomy and abdomen closure (12/15). S/p LORENZO and Cardioversion on 12/30 with cardiology. LLE with AKA delayed by nutritional optimization. 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 Now on telemetry. s/p picc 2/20.     -Fungitell  -Tylenol and Oxycodone PRN  -Regular diet with TPN  -Replete ostomy output q6hr  -Continue Immodium/Tincture of Opium.  -LV thrombus w/ LLE ischemia s/p AKA 02/15, pending closure   -Erta( 2/13-2/24)  (Dapto 2/11-2/24)    -picc 2/20   -Undrainable abscess in Abd  -Continue metoprolol/amiodarone   -Holding spironolactone, entresto   -SCDs, SQL 65BID.   -f/u PT recs s/p amputation   -Dispo: [facility]

## 2023-02-25 NOTE — PROGRESS NOTE ADULT - ASSESSMENT
78M with PMHx of IVDU found unresponsive at his nursing home, resolved after Narcan in the field and brought to [facility]. Found to have PE, atrial flutter, and large LV thrombus on ECHOand CTA showing mid SMA with embolus s/p Ex lap, SMA embolectomy, 80cm SBR, abthera vac left in discontinuity (12/11) and transferred to SICU intubated. S/p second look (12/13) and most recently s/p OR for 3rd look, end-to-end anastomosis of remaining bowel, loop ileostomy and abdomen closure (12/15). Prev stepped down to telemetry. LLE ischemia, AKA delayed by nutritional optimization. Transferred to SICU in the setting of sepsis for HD monitoring. 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    - Maintain surgical dressing, changed today, can reinforce as needed  - Will discuss RTOR plan when medically stable and afebrile  - Remainder of care per primary team  - Vascular will continue to follow

## 2023-02-26 NOTE — PROGRESS NOTE ADULT - ASSESSMENT
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 Then with new fevers (on daptomycin and ertapenem) since 2/21, shortly following RUE PICC placement for TPN on 2/21. At elevated risk of invasive candidiasis i/s/o TPN exposure however blood cultures remain negative.  - f/u bcx 2/23, 2/25--ngtd  - low threshold to remove PICC if bcx returns positive or develops signs of new sepsis  - recommend repeat CT AP with contrast to assess for residual undrained RLQ abscess as a plausible etiology of breakthrough fevers despite targeted antibiotic therapy (see prior CT 2/12 and 1/26)--not being pursued per primary team  - recommend gallium scan to assess for occult foci of infection given recent bacteremia and breakthrough fevers despite targeted antibiotic therapy  - advise continue daptomycin and ertapenem

## 2023-02-26 NOTE — PROGRESS NOTE ADULT - SUBJECTIVE AND OBJECTIVE BOX
INTERVAL HPI/OVERNIGHT EVENTS: Recurrent fever.    CONSTITUTIONAL:  Negative fever or chills, feels well, good appetite  EYES:  Negative  blurry vision or double vision  CARDIOVASCULAR:  Negative for chest pain or palpitations  RESPIRATORY:  Negative for cough, wheezing, or SOB   GASTROINTESTINAL:  Negative for nausea, vomiting, diarrhea, constipation, or abdominal pain  GENITOURINARY:  Negative frequency, urgency or dysuria  NEUROLOGIC:  No headache, confusion, dizziness, lightheadedness      ANTIBIOTICS/RELEVANT:    MEDICATIONS  (STANDING):  aMIOdarone    Tablet 100 milliGRAM(s) Oral every 24 hours  ascorbic acid 500 milliGRAM(s) Oral daily  chlorhexidine 2% Cloths 1 Application(s) Topical daily  cholestyramine Powder (Sugar-Free) 2 Gram(s) Oral three times a day  DAPTOmycin IVPB 500 milliGRAM(s) IV Intermittent every 24 hours  dextrose 5% + lactated ringers. 1000 milliLiter(s) (100 mL/Hr) IV Continuous <Continuous>  diphenoxylate/atropine 2 Tablet(s) Oral every 8 hours  enoxaparin Injectable 65 milliGRAM(s) SubCutaneous every 12 hours  ertapenem  IVPB 1000 milliGRAM(s) IV Intermittent every 24 hours  fat emulsion (Fish Oil and Plant Based) 20% Infusion 0.774 Gm/kG/Day (20.83 mL/Hr) IV Continuous <Continuous>  influenza  Vaccine (HIGH DOSE) 0.7 milliLiter(s) IntraMuscular once  loperamide 4 milliGRAM(s) Oral every 8 hours  metoprolol succinate ER 25 milliGRAM(s) Oral daily  mirtazapine 30 milliGRAM(s) Oral at bedtime  multivitamin 1 Tablet(s) Oral daily  opium Tincture 6 milliGRAM(s) Oral every 12 hours  pantoprazole    Tablet 40 milliGRAM(s) Oral two times a day  Parenteral Nutrition - Adult 1 Each (83 mL/Hr) TPN Continuous <Continuous>  Parenteral Nutrition - Adult 1 Each (83 mL/Hr) TPN Continuous <Continuous>  povidone iodine 10% Solution 1 Application(s) Topical daily  psyllium Powder 1 Packet(s) Oral every 8 hours  zinc sulfate 220 milliGRAM(s) Oral daily    MEDICATIONS  (PRN):  acetaminophen     Tablet .. 650 milliGRAM(s) Oral every 6 hours PRN Temp greater or equal to 38C (100.4F), Mild Pain (1 - 3)  melatonin 3 milliGRAM(s) Oral at bedtime PRN Insomnia  ondansetron Injectable 4 milliGRAM(s) IV Push every 6 hours PRN Nausea and/or Vomiting  sodium chloride 0.9% lock flush 10 milliLiter(s) IV Push every 1 hour PRN Pre/post blood products, medications, blood draw, and to maintain line patency        Vital Signs Last 24 Hrs  T(C): 37.9 (26 Feb 2023 14:08), Max: 38.8 (26 Feb 2023 05:00)  T(F): 100.3 (26 Feb 2023 14:08), Max: 101.9 (26 Feb 2023 05:00)  HR: 102 (26 Feb 2023 11:31) (88 - 102)  BP: 136/74 (26 Feb 2023 11:31) (104/55 - 136/74)  BP(mean): 98 (26 Feb 2023 11:31) (71 - 98)  RR: 17 (26 Feb 2023 11:31) (17 - 17)  SpO2: 100% (26 Feb 2023 11:31) (100% - 100%)    Parameters below as of 26 Feb 2023 11:31  Patient On (Oxygen Delivery Method): room air        PHYSICAL EXAM:  Constitutional: NAD  Eyes: JOHN, EOMI  Ear/Nose/Throat: no oral lesion, no sinus tenderness on percussion	  Neck: no JVD, no lymphadenopathy, supple  Respiratory: CTA keerthi  Cardiovascular: S1S2 RRR, no murmurs  Gastrointestinal:soft, (+) BS, no HSM  Extremities:no e/e/c  Vascular: DP Pulse:	right normal; left normal      LABS:                        7.7    10.85 )-----------( 388      ( 25 Feb 2023 06:20 )             23.2     02-25    138  |  102  |  8   ----------------------------<  130<H>  4.4   |  28  |  0.51    Ca    8.0<L>      25 Feb 2023 06:20  Phos  2.9     02-25  Mg     2.0     02-25            MICROBIOLOGY: reviewed    RADIOLOGY & ADDITIONAL STUDIES: reviewed

## 2023-02-26 NOTE — PROGRESS NOTE ADULT - SUBJECTIVE AND OBJECTIVE BOX
No issues reported overnight.     ***Note in progress***    OVERNIGHT EVENTS: NAEO    SUBJECTIVE / INTERVAL HPI: Patient seen and examined at bedside. Patient denying chest pain, SOB, palpitations, cough. Patient denies fever, chills, HA, Dizziness, change in vision/hearing, N/V, abdominal pain, diarrhea, constipation, hematochezia/melena, dysuria, hematuria, new onset weakness/numbness, LE pain and/or swelling.    Remaining ROS negative       PHYSICAL EXAM:    General: WDWN  HEENT: NC/AT; PERRL, anicteric sclera; MMM  Neck: supple  Cardiovascular: +S1/S2, RRR  Respiratory: CTA B/L; no W/R/R  Gastrointestinal: soft, NT/ND; +BSx4  Extremities: WWP; no edema, clubbing or cyanosis  Vascular: 2+ radial, DP/PT pulses B/L  Neurological: AAOx3; no focal deficits  Psychiatric: pleasant mood and affect  Dermatologic: no appreciable wounds or damage to the skin    VITAL SIGNS:  Vital Signs Last 24 Hrs  T(C): 38.8 (26 Feb 2023 05:00), Max: 38.8 (26 Feb 2023 05:00)  T(F): 101.9 (26 Feb 2023 05:00), Max: 101.9 (26 Feb 2023 05:00)  HR: 98 (26 Feb 2023 08:20) (88 - 98)  BP: 104/55 (26 Feb 2023 08:20) (104/55 - 131/60)  BP(mean): 71 (26 Feb 2023 08:20) (71 - 87)  RR: 17 (26 Feb 2023 08:20) (17 - 18)  SpO2: 100% (26 Feb 2023 08:20) (100% - 100%)    Parameters below as of 26 Feb 2023 08:20  Patient On (Oxygen Delivery Method): room air          MEDICATIONS:  MEDICATIONS  (STANDING):  aMIOdarone    Tablet 100 milliGRAM(s) Oral every 24 hours  ascorbic acid 500 milliGRAM(s) Oral daily  chlorhexidine 2% Cloths 1 Application(s) Topical daily  cholestyramine Powder (Sugar-Free) 2 Gram(s) Oral three times a day  DAPTOmycin IVPB 500 milliGRAM(s) IV Intermittent every 24 hours  dextrose 5% + lactated ringers. 1000 milliLiter(s) (100 mL/Hr) IV Continuous <Continuous>  diphenoxylate/atropine 2 Tablet(s) Oral every 8 hours  enoxaparin Injectable 65 milliGRAM(s) SubCutaneous every 12 hours  ertapenem  IVPB 1000 milliGRAM(s) IV Intermittent every 24 hours  fat emulsion (Fish Oil and Plant Based) 20% Infusion 0.774 Gm/kG/Day (20.83 mL/Hr) IV Continuous <Continuous>  influenza  Vaccine (HIGH DOSE) 0.7 milliLiter(s) IntraMuscular once  loperamide 4 milliGRAM(s) Oral every 8 hours  metoprolol succinate ER 25 milliGRAM(s) Oral daily  mirtazapine 30 milliGRAM(s) Oral at bedtime  multivitamin 1 Tablet(s) Oral daily  opium Tincture 6 milliGRAM(s) Oral every 12 hours  pantoprazole    Tablet 40 milliGRAM(s) Oral two times a day  Parenteral Nutrition - Adult 1 Each (83 mL/Hr) TPN Continuous <Continuous>  povidone iodine 10% Solution 1 Application(s) Topical daily  psyllium Powder 1 Packet(s) Oral every 8 hours  zinc sulfate 220 milliGRAM(s) Oral daily    MEDICATIONS  (PRN):  acetaminophen     Tablet .. 650 milliGRAM(s) Oral every 6 hours PRN Temp greater or equal to 38C (100.4F), Mild Pain (1 - 3)  melatonin 3 milliGRAM(s) Oral at bedtime PRN Insomnia  ondansetron Injectable 4 milliGRAM(s) IV Push every 6 hours PRN Nausea and/or Vomiting  sodium chloride 0.9% lock flush 10 milliLiter(s) IV Push every 1 hour PRN Pre/post blood products, medications, blood draw, and to maintain line patency      ALLERGIES:  Allergies    No Known Allergies    Intolerances        LABS:                        7.7    10.85 )-----------( 388      ( 25 Feb 2023 06:20 )             23.2     02-25    138  |  102  |  8   ----------------------------<  130<H>  4.4   |  28  |  0.51    Ca    8.0<L>      25 Feb 2023 06:20  Phos  2.9     02-25  Mg     2.0     02-25          CAPILLARY BLOOD GLUCOSE          RADIOLOGY & ADDITIONAL TESTS: Reviewed.

## 2023-02-26 NOTE — PROGRESS NOTE ADULT - SUBJECTIVE AND OBJECTIVE BOX
INTERVAL HPI/OVERNIGHT EVENTS:  Yesterday, 750@6AM, 500@12pm, Fungitell sent, fever 100.6/100.4 refused rectal => BCx drawn x1. Patient seen and examined by chief resident and team on AM rounds.     STATUS POST:   12/11: Ex lap, SMA embolectomy, 80cm SBR, abthera vac  12/13: Second look, SBR  12/15: Ex-lap, no dead gut, DANIEL of discontinuous gut, loop ileostomy; EBL minimal, 1L crystalloid, UOP 150mL   12/30: LORENZO & Cardioversion on 12/30.   2/15: L AKA guillotine, EBL 10cc, IVF 400cc,     SUBJECTIVE:    MEDICATIONS  (STANDING):  aMIOdarone    Tablet 100 milliGRAM(s) Oral every 24 hours  ascorbic acid 500 milliGRAM(s) Oral daily  chlorhexidine 2% Cloths 1 Application(s) Topical daily  cholestyramine Powder (Sugar-Free) 2 Gram(s) Oral three times a day  DAPTOmycin IVPB 500 milliGRAM(s) IV Intermittent every 24 hours  dextrose 5% + lactated ringers. 1000 milliLiter(s) (100 mL/Hr) IV Continuous <Continuous>  diphenoxylate/atropine 2 Tablet(s) Oral every 8 hours  enoxaparin Injectable 65 milliGRAM(s) SubCutaneous every 12 hours  ertapenem  IVPB 1000 milliGRAM(s) IV Intermittent every 24 hours  fat emulsion (Fish Oil and Plant Based) 20% Infusion 0.774 Gm/kG/Day (20.83 mL/Hr) IV Continuous <Continuous>  influenza  Vaccine (HIGH DOSE) 0.7 milliLiter(s) IntraMuscular once  lactated ringers Bolus 1000 milliLiter(s) IV Bolus once  loperamide 4 milliGRAM(s) Oral every 8 hours  metoprolol succinate ER 25 milliGRAM(s) Oral daily  mirtazapine 30 milliGRAM(s) Oral at bedtime  multivitamin 1 Tablet(s) Oral daily  opium Tincture 6 milliGRAM(s) Oral every 12 hours  pantoprazole    Tablet 40 milliGRAM(s) Oral two times a day  Parenteral Nutrition - Adult 1 Each (83 mL/Hr) TPN Continuous <Continuous>  povidone iodine 10% Solution 1 Application(s) Topical daily  psyllium Powder 1 Packet(s) Oral every 8 hours  zinc sulfate 220 milliGRAM(s) Oral daily    MEDICATIONS  (PRN):  acetaminophen     Tablet .. 650 milliGRAM(s) Oral every 6 hours PRN Temp greater or equal to 38C (100.4F), Mild Pain (1 - 3)  melatonin 3 milliGRAM(s) Oral at bedtime PRN Insomnia  ondansetron Injectable 4 milliGRAM(s) IV Push every 6 hours PRN Nausea and/or Vomiting  sodium chloride 0.9% lock flush 10 milliLiter(s) IV Push every 1 hour PRN Pre/post blood products, medications, blood draw, and to maintain line patency      Vital Signs Last 24 Hrs  T(C): 37.2 (25 Feb 2023 22:28), Max: 38.1 (25 Feb 2023 17:51)  T(F): 99 (25 Feb 2023 22:28), Max: 100.6 (25 Feb 2023 17:51)  HR: 98 (26 Feb 2023 04:19) (88 - 104)  BP: 127/60 (26 Feb 2023 04:19) (124/64 - 131/60)  BP(mean): 86 (26 Feb 2023 04:19) (86 - 87)  RR: 17 (26 Feb 2023 04:19) (17 - 20)  SpO2: 100% (26 Feb 2023 04:19) (100% - 100%)    Parameters below as of 26 Feb 2023 04:19  Patient On (Oxygen Delivery Method): room air        PHYSICAL EXAM:  Constitutional: A&Ox3  Respiratory: non labored breathing, no respiratory distress  Cardiovascular: NSR, RRR  Gastrointestinal: soft, NT/ND. No rebound/guarding                 Ostomy: continues to have high output ostomy, p/p/p   Extremities: s/p L AKA gauze c/d/i, R leg in offloading boot                  I&O's Detail    24 Feb 2023 07:01  -  25 Feb 2023 07:00  --------------------------------------------------------  IN:    dextrose 5% + lactated ringers: 2400 mL    Fat Emulsion (Fish Oil &amp; Plant Based) 20% Infusion: 250.8 mL    Lactated Ringers Bolus: 1600 mL    Oral Fluid: 640 mL    TPN (Total Parenteral Nutrition): 1992 mL  Total IN: 6882.8 mL    OUT:    Ileostomy (mL): 3000 mL    Voided (mL): 3800 mL  Total OUT: 6800 mL    Total NET: 82.8 mL      25 Feb 2023 07:01  -  26 Feb 2023 05:33  --------------------------------------------------------  IN:    dextrose 5% + lactated ringers: 2300 mL    Fat Emulsion (Fish Oil &amp; Plant Based) 20% Infusion: 166.4 mL    IV PiggyBack: 500 mL    IV PiggyBack: 110 mL    Oral Fluid: 480 mL    TPN (Total Parenteral Nutrition): 1909 mL  Total IN: 5465.4 mL    OUT:    Ileostomy (mL): 1275 mL    Voided (mL): 4050 mL  Total OUT: 5325 mL    Total NET: 140.4 mL          LABS:                        7.7    10.85 )-----------( 388      ( 25 Feb 2023 06:20 )             23.2     02-25    138  |  102  |  8   ----------------------------<  130<H>  4.4   |  28  |  0.51    Ca    8.0<L>      25 Feb 2023 06:20  Phos  2.9     02-25  Mg     2.0     02-25            RADIOLOGY & ADDITIONAL STUDIES: INTERVAL HPI/OVERNIGHT EVENTS:  Yesterday, 750@6AM, 500@12pm, Fungitell sent, fever 100.6/100.4 refused rectal => BCx drawn x1. Patient seen and examined by chief resident and team on AM rounds. Patient VSS, with exception of febrile to 101.9 at 5am. -n/-v/+bf. pain well controlled, resting comfortably in bed.     STATUS POST:   12/11: Ex lap, SMA embolectomy, 80cm SBR, abthera vac  12/13: Second look, SBR  12/15: Ex-lap, no dead gut, DANIEL of discontinuous gut, loop ileostomy; EBL minimal, 1L crystalloid, UOP 150mL   12/30: LORENZO & Cardioversion on 12/30.   2/15: L AKA guillotine, EBL 10cc, IVF 400cc,     SUBJECTIVE:    MEDICATIONS  (STANDING):  aMIOdarone    Tablet 100 milliGRAM(s) Oral every 24 hours  ascorbic acid 500 milliGRAM(s) Oral daily  chlorhexidine 2% Cloths 1 Application(s) Topical daily  cholestyramine Powder (Sugar-Free) 2 Gram(s) Oral three times a day  DAPTOmycin IVPB 500 milliGRAM(s) IV Intermittent every 24 hours  dextrose 5% + lactated ringers. 1000 milliLiter(s) (100 mL/Hr) IV Continuous <Continuous>  diphenoxylate/atropine 2 Tablet(s) Oral every 8 hours  enoxaparin Injectable 65 milliGRAM(s) SubCutaneous every 12 hours  ertapenem  IVPB 1000 milliGRAM(s) IV Intermittent every 24 hours  fat emulsion (Fish Oil and Plant Based) 20% Infusion 0.774 Gm/kG/Day (20.83 mL/Hr) IV Continuous <Continuous>  influenza  Vaccine (HIGH DOSE) 0.7 milliLiter(s) IntraMuscular once  lactated ringers Bolus 1000 milliLiter(s) IV Bolus once  loperamide 4 milliGRAM(s) Oral every 8 hours  metoprolol succinate ER 25 milliGRAM(s) Oral daily  mirtazapine 30 milliGRAM(s) Oral at bedtime  multivitamin 1 Tablet(s) Oral daily  opium Tincture 6 milliGRAM(s) Oral every 12 hours  pantoprazole    Tablet 40 milliGRAM(s) Oral two times a day  Parenteral Nutrition - Adult 1 Each (83 mL/Hr) TPN Continuous <Continuous>  povidone iodine 10% Solution 1 Application(s) Topical daily  psyllium Powder 1 Packet(s) Oral every 8 hours  zinc sulfate 220 milliGRAM(s) Oral daily    MEDICATIONS  (PRN):  acetaminophen     Tablet .. 650 milliGRAM(s) Oral every 6 hours PRN Temp greater or equal to 38C (100.4F), Mild Pain (1 - 3)  melatonin 3 milliGRAM(s) Oral at bedtime PRN Insomnia  ondansetron Injectable 4 milliGRAM(s) IV Push every 6 hours PRN Nausea and/or Vomiting  sodium chloride 0.9% lock flush 10 milliLiter(s) IV Push every 1 hour PRN Pre/post blood products, medications, blood draw, and to maintain line patency      Vital Signs Last 24 Hrs  T(C): 37.2 (25 Feb 2023 22:28), Max: 38.1 (25 Feb 2023 17:51)  T(F): 99 (25 Feb 2023 22:28), Max: 100.6 (25 Feb 2023 17:51)  HR: 98 (26 Feb 2023 04:19) (88 - 104)  BP: 127/60 (26 Feb 2023 04:19) (124/64 - 131/60)  BP(mean): 86 (26 Feb 2023 04:19) (86 - 87)  RR: 17 (26 Feb 2023 04:19) (17 - 20)  SpO2: 100% (26 Feb 2023 04:19) (100% - 100%)    Parameters below as of 26 Feb 2023 04:19  Patient On (Oxygen Delivery Method): room air        PHYSICAL EXAM:  Constitutional: A&Ox3  Respiratory: non labored breathing, no respiratory distress  Cardiovascular: NSR, RRR  Gastrointestinal: soft, NT/ND. No rebound/guarding                 Ostomy: continues to have high output ostomy, p/p/p   Extremities: s/p L AKA gauze c/d/i, R leg in offloading boot                  I&O's Detail    24 Feb 2023 07:01  -  25 Feb 2023 07:00  --------------------------------------------------------  IN:    dextrose 5% + lactated ringers: 2400 mL    Fat Emulsion (Fish Oil &amp; Plant Based) 20% Infusion: 250.8 mL    Lactated Ringers Bolus: 1600 mL    Oral Fluid: 640 mL    TPN (Total Parenteral Nutrition): 1992 mL  Total IN: 6882.8 mL    OUT:    Ileostomy (mL): 3000 mL    Voided (mL): 3800 mL  Total OUT: 6800 mL    Total NET: 82.8 mL      25 Feb 2023 07:01  -  26 Feb 2023 05:33  --------------------------------------------------------  IN:    dextrose 5% + lactated ringers: 2300 mL    Fat Emulsion (Fish Oil &amp; Plant Based) 20% Infusion: 166.4 mL    IV PiggyBack: 500 mL    IV PiggyBack: 110 mL    Oral Fluid: 480 mL    TPN (Total Parenteral Nutrition): 1909 mL  Total IN: 5465.4 mL    OUT:    Ileostomy (mL): 1275 mL    Voided (mL): 4050 mL  Total OUT: 5325 mL    Total NET: 140.4 mL          LABS:                        7.7    10.85 )-----------( 388      ( 25 Feb 2023 06:20 )             23.2     02-25    138  |  102  |  8   ----------------------------<  130<H>  4.4   |  28  |  0.51    Ca    8.0<L>      25 Feb 2023 06:20  Phos  2.9     02-25  Mg     2.0     02-25            RADIOLOGY & ADDITIONAL STUDIES:

## 2023-02-26 NOTE — PROGRESS NOTE ADULT - ASSESSMENT
77M with PMHx of IVDU found unresponsive at his nursing home, resolved after Narcan in the field and brought to [facility]. Found to have PE, atrial flutter, and large LV thrombus on echo. Transferred to [facility] for further management. Pt c/o abdominal pain on 12/10 CTA showing mid SMA with embolus. Abnormal distal small bowel loops and cecum with dilatation and pneumatosis suggesting infarcted bowel. One or two tiny foci of intrahepatic portal vein pneumatosis. Segmental infarction upper pole left kidney. Now s/p Ex lap, SMA embolectomy, 80cm SBR, abthera vac left in discontinuity (12/11) and transferred to SICU intubated. S/p second look (12/13) and most recently s/p OR for 3rd look, end-to-end anastomosis of remaining bowel, loop ileostomy and abdomen closure (12/15). S/p LORENZO and Cardioversion on 12/30 with cardiology. LLE with AKA delayed by nutritional optimization. 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 Now on telemetry. s/p picc 2/20.     plan:   -Regular diet with TPN  -Tylenol and Oxycodone PRN  -Replete ostomy output q6hr  -Continue Immodium/Tincture of Opium.  -LV thrombus w/ LLE ischemia s/p AKA 02/15, pending closure   -Erta( 2/13-2/24)  (Dapto 2/11-2/24)    -picc 2/20   -Undrainable abscess in Abd  -Continue metoprolol/amiodarone   -Holding spironolactone, entresto   -SCDs, SQL 65BID.   -f/u PT recs s/p amputation   -Dispo: [facility]AN

## 2023-02-26 NOTE — PROGRESS NOTE ADULT - ASSESSMENT
77M first presented to [facility] from [facility] due to unresponsiveness (responded to Narcan). At [facility], found to have subsegmental pulmonary embolism in RUL, rapid atrial flutter with severely reduced LVEF with LV thrombus. Transferred to [facility] on 12/7/22 for LORENZO and possible ablation. At [facility], was found to have left leg ischemia (left leg arterial thrombus on LE duplex on 12/8). Was being considered for rhythm control when he developed abdominal pain, CTA (12/10/22) showed embolus in SMA, bowel infarct, requiring ex-lap on 12/11 with SMA embolectomy, 80cm small bowel resection; On 12/13, underwent 2nd look laparotomy, with 80cm small bowel resection, closure with abthera. On 12/15, underwent 3rd look laparotomy, end-to-end anastomosis of remaining bowel, loop ileostomy and abdominal closure. Eventually underwent LORENZO/DCCV on 12/30/22, now in sinus rhythm. More recently found to have k. 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     #bacteremia - k pneumoniae  -cultures cleared, on erta/dapto - but still continues to have fever over night  - reach out to ID again.  Likely due to intraabdominal fluid collection.  Awaiting repeat CT scan.  Blood cultures were taken and results pending.    -management per ID. If fevers become even more persistent, then may have to discuss removing PICC line.  Patient is at risk for candidal infection due to being on TPN.    -abscess not drainable per IR  -PICC placed today    #s/p left AKA  management per primary team/ vascular  - still needs to be closed    #anemia  - hemoglobin improved following transfusion of PRBCs yesterday  no e/o active bleeding - transfusing and repeating post-transfusion CBC  - he has been intermittently tachycardic for entire hospitalization  - etiology of anemia likely multifactorial    #Suicidal Ideation  #Insomnia   being seen by psych, appreciate input  - does not require SI precautions per last psychiatry note  - Continue mirtazapine 30mg QHS     # Acute Systolic Heart Failure   - Metoprolol succinate 25mg qd - continue  - does not appear volume overloaded at this time.  Lies on his back, and no shortness of breath on exam.      # Atrial Flutter  - s/p DCCV 12/30  - EP recs - uninterrupted AC x 30 days ; January 29th was 30 days post DCCV.   -Continue Lovenox, amiodarone and metoprolol succinate    # Pulmonary embolism (subsegmental RUL)   - After completion of AC for DCCV, would need to continue AC for PE - currently continuing lovenox    # Bowel ischemia - s/p SMA embolectomy; Small bowel resection  - ileostomy in place  - plan of primary team  - loperamide and diphenoxylate atropine    # Severe protein-calorie malnutrition  # Sacral wound   - appreciate input from nutrition  - Wound care per nursing    #hypokalemia  - replete  - repeat labs tomorrow    #DVT ppx - continue lovenox for PE     >35 minutes spent on this encounter, including face to face with patient, care coordination and documentation.   Plan of care discussed with primary team

## 2023-02-27 NOTE — PROGRESS NOTE ADULT - ASSESSMENT
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 Then with new fevers (on daptomycin and ertapenem) since 2/21, shortly following RUE PICC placement for TPN on 2/21. At elevated risk of invasive candidiasis i/s/o TPN exposure however blood cultures remain negative. Per discussion with Dr. Lanier, possible RTOR Thursday 3/2 for exploration.  - f/u bcx 2/23, 2/25--ngtd  - if/when goes to OR and intra-abdominal purulence/necrosis encountered, would send specimen for gram stain and culture  - low threshold to remove PICC if bcx returns positive or develops signs of new sepsis  - advise continue daptomycin 500mg IV q24h and ertapenem 1g IV q24h

## 2023-02-27 NOTE — PROGRESS NOTE ADULT - SUBJECTIVE AND OBJECTIVE BOX
Feeling okay.  Pain is well controlled in his L leg, and able to tolerate dressing changes.     OVERNIGHT EVENTS: NAEO    SUBJECTIVE / INTERVAL HPI: Patient seen and examined at bedside. Patient denying chest pain, SOB, palpitations, cough. Patient denies fever, chills, HA, Dizziness, change in vision/hearing, N/V, abdominal pain, diarrhea, constipation, hematochezia/melena, dysuria, hematuria, new onset weakness/numbness, LE pain and/or swelling.    Remaining ROS negative     PHYSICAL EXAM:      General: no acute distress, lying in bed  HEENT: NC/AT;  slightly dry mucous membranes  Cardiovascular: +S1/S2, RRR  Respiratory: CTA B/L; no W/R/R  Gastrointestinal: soft, nondistended  Extremities: RLE wwp, s/p L AKA  Neurological: no focal deficits, speech is fluent  VITAL SIGNS:  Vital Signs Last 24 Hrs  T(C): 36.7 (27 Feb 2023 10:19), Max: 38 (27 Feb 2023 04:50)  T(F): 98.1 (27 Feb 2023 10:19), Max: 100.4 (27 Feb 2023 04:50)  HR: 98 (27 Feb 2023 08:57) (94 - 108)  BP: 124/68 (27 Feb 2023 08:57) (108/56 - 133/75)  BP(mean): 87 (27 Feb 2023 08:57) (77 - 92)  RR: 17 (27 Feb 2023 08:57) (17 - 18)  SpO2: 100% (27 Feb 2023 08:57) (95% - 100%)    Parameters below as of 27 Feb 2023 08:57  Patient On (Oxygen Delivery Method): room air          MEDICATIONS:  MEDICATIONS  (STANDING):  aMIOdarone    Tablet 100 milliGRAM(s) Oral every 24 hours  ascorbic acid 500 milliGRAM(s) Oral daily  chlorhexidine 2% Cloths 1 Application(s) Topical daily  cholestyramine Powder (Sugar-Free) 2 Gram(s) Oral three times a day  DAPTOmycin IVPB 500 milliGRAM(s) IV Intermittent every 24 hours  dextrose 5% + lactated ringers. 1000 milliLiter(s) (100 mL/Hr) IV Continuous <Continuous>  diphenoxylate/atropine 2 Tablet(s) Oral every 8 hours  enoxaparin Injectable 65 milliGRAM(s) SubCutaneous every 12 hours  ertapenem  IVPB 1000 milliGRAM(s) IV Intermittent every 24 hours  fat emulsion (Fish Oil and Plant Based) 20% Infusion 0.774 Gm/kG/Day (20.83 mL/Hr) IV Continuous <Continuous>  influenza  Vaccine (HIGH DOSE) 0.7 milliLiter(s) IntraMuscular once  loperamide 4 milliGRAM(s) Oral every 8 hours  metoprolol succinate ER 25 milliGRAM(s) Oral daily  mirtazapine 30 milliGRAM(s) Oral at bedtime  multivitamin 1 Tablet(s) Oral daily  opium Tincture 6 milliGRAM(s) Oral every 12 hours  pantoprazole    Tablet 40 milliGRAM(s) Oral two times a day  Parenteral Nutrition - Adult 1 Each (83 mL/Hr) TPN Continuous <Continuous>  Parenteral Nutrition - Adult 1 Each (83 mL/Hr) TPN Continuous <Continuous>  povidone iodine 10% Solution 1 Application(s) Topical daily  psyllium Powder 1 Packet(s) Oral every 8 hours  zinc sulfate 220 milliGRAM(s) Oral daily    MEDICATIONS  (PRN):  acetaminophen     Tablet .. 650 milliGRAM(s) Oral every 6 hours PRN Temp greater or equal to 38C (100.4F), Mild Pain (1 - 3)  melatonin 3 milliGRAM(s) Oral at bedtime PRN Insomnia  ondansetron Injectable 4 milliGRAM(s) IV Push every 6 hours PRN Nausea and/or Vomiting  sodium chloride 0.9% lock flush 10 milliLiter(s) IV Push every 1 hour PRN Pre/post blood products, medications, blood draw, and to maintain line patency      ALLERGIES:  Allergies    No Known Allergies    Intolerances        LABS:                        7.9    10.13 )-----------( 454      ( 27 Feb 2023 05:30 )             24.4     02-27    133<L>  |  96  |  12  ----------------------------<  124<H>  5.1   |  29  |  0.52    Ca    8.3<L>      27 Feb 2023 05:30  Phos  4.2     02-27  Mg     2.2     02-27          CAPILLARY BLOOD GLUCOSE          RADIOLOGY & ADDITIONAL TESTS: Reviewed.

## 2023-02-27 NOTE — PROGRESS NOTE ADULT - SUBJECTIVE AND OBJECTIVE BOX
SUBJECTIVE:  Doing well this AM. NAEON. Febrile to 100.4 this AM. BCx ngtd. Pain is well controlled. PT as tolerated. Tolerating diet.    MEDICATIONS  (STANDING):  aMIOdarone    Tablet 100 milliGRAM(s) Oral every 24 hours  ascorbic acid 500 milliGRAM(s) Oral daily  chlorhexidine 2% Cloths 1 Application(s) Topical daily  cholestyramine Powder (Sugar-Free) 2 Gram(s) Oral three times a day  DAPTOmycin IVPB 500 milliGRAM(s) IV Intermittent every 24 hours  dextrose 5% + lactated ringers. 1000 milliLiter(s) (100 mL/Hr) IV Continuous <Continuous>  diphenoxylate/atropine 2 Tablet(s) Oral every 8 hours  enoxaparin Injectable 65 milliGRAM(s) SubCutaneous every 12 hours  ertapenem  IVPB 1000 milliGRAM(s) IV Intermittent every 24 hours  fat emulsion (Fish Oil and Plant Based) 20% Infusion 0.774 Gm/kG/Day (20.83 mL/Hr) IV Continuous <Continuous>  influenza  Vaccine (HIGH DOSE) 0.7 milliLiter(s) IntraMuscular once  loperamide 4 milliGRAM(s) Oral every 8 hours  metoprolol succinate ER 25 milliGRAM(s) Oral daily  mirtazapine 30 milliGRAM(s) Oral at bedtime  multivitamin 1 Tablet(s) Oral daily  opium Tincture 6 milliGRAM(s) Oral every 12 hours  pantoprazole    Tablet 40 milliGRAM(s) Oral two times a day  Parenteral Nutrition - Adult 1 Each (83 mL/Hr) TPN Continuous <Continuous>  povidone iodine 10% Solution 1 Application(s) Topical daily  psyllium Powder 1 Packet(s) Oral every 8 hours  zinc sulfate 220 milliGRAM(s) Oral daily    MEDICATIONS  (PRN):  acetaminophen     Tablet .. 650 milliGRAM(s) Oral every 6 hours PRN Temp greater or equal to 38C (100.4F), Mild Pain (1 - 3)  melatonin 3 milliGRAM(s) Oral at bedtime PRN Insomnia  ondansetron Injectable 4 milliGRAM(s) IV Push every 6 hours PRN Nausea and/or Vomiting  sodium chloride 0.9% lock flush 10 milliLiter(s) IV Push every 1 hour PRN Pre/post blood products, medications, blood draw, and to maintain line patency      Vital Signs Last 24 Hrs  T(C): 38 (27 Feb 2023 04:50), Max: 38 (27 Feb 2023 04:50)  T(F): 100.4 (27 Feb 2023 04:50), Max: 100.4 (27 Feb 2023 04:50)  HR: 98 (27 Feb 2023 03:38) (94 - 108)  BP: 131/67 (27 Feb 2023 03:38) (104/55 - 136/74)  BP(mean): 91 (27 Feb 2023 03:38) (71 - 98)  RR: 17 (27 Feb 2023 03:38) (17 - 18)  SpO2: 100% (27 Feb 2023 03:38) (95% - 100%)    Parameters below as of 27 Feb 2023 03:38  Patient On (Oxygen Delivery Method): room air        Physical Exam:  General: NAD, resting comfortably in bed  Pulmonary: Nonlabored breathing, no respiratory distress  Cardiovascular: NSR  Abdominal: soft, NT/ND  Extremities: WWP, normal strength, AKA site clean w/ good granulation  Neuro: A/O x 3, CNs II-XII grossly intact, no focal deficits    I&O's Summary    26 Feb 2023 07:01  -  27 Feb 2023 07:00  --------------------------------------------------------  IN: 5479.4 mL / OUT: 5900 mL / NET: -420.6 mL    27 Feb 2023 07:01  -  27 Feb 2023 07:23  --------------------------------------------------------  IN: 203.8 mL / OUT: 0 mL / NET: 203.8 mL        LABS:                        7.9    10.13 )-----------( 454      ( 27 Feb 2023 05:30 )             24.4     02-27    133<L>  |  96  |  12  ----------------------------<  124<H>  5.1   |  29  |  0.52    Ca    8.3<L>      27 Feb 2023 05:30  Phos  4.2     02-27  Mg     2.2     02-27

## 2023-02-27 NOTE — PROGRESS NOTE ADULT - ATTENDING COMMENTS
LLE ischemia - now s/p guillotine AKA, awaiting formalization with Vascular surgery  Fevers - unclear etiology, Blood cultures negative, known intraabdominal collection not amenable to percutaneous drainage, will continue to monitor, no leukocytosis and no tachycardia  High ostomy output - continue repletion, will need ostomy closure at some point  Malnutrition - continue diet as tolerated, PICC / TPN    Given no fevers, no leukocytosis, no evidence of systemic infection currently, will re-engage vascular surgery to consider formalization of amputation  Will require ostomy closure at some point given ongoing high output requiring rehydration, at that time can evaluate intraabdominal collection

## 2023-02-27 NOTE — PROGRESS NOTE ADULT - ASSESSMENT
77M with PMHx of IVDU found unresponsive at his nursing home, resolved after Narcan in the field and brought to [facility]. Found to have PE, atrial flutter, and large LV thrombus on echo. Transferred to [facility] for further management. Pt c/o abdominal pain on 12/10 CTA showing mid SMA with embolus. Abnormal distal small bowel loops and cecum with dilatation and pneumatosis suggesting infarcted bowel. One or two tiny foci of intrahepatic portal vein pneumatosis. Segmental infarction upper pole left kidney. Now s/p Ex lap, SMA embolectomy, 80cm SBR, abthera vac left in discontinuity (12/11) and transferred to SICU intubated. S/p second look (12/13) and most recently s/p OR for 3rd look, end-to-end anastomosis of remaining bowel, loop ileostomy and abdomen closure (12/15). S/p LORENZO and Cardioversion on 12/30 with cardiology. LLE with AKA delayed by nutritional optimization. 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 Now on telemetry. s/p picc 2/20.     -Tylenol and Oxycodone PRN  -Regular diet with TPN  -Replete ostomy output q6hr  -Continue Immodium/Tincture of Opium.  -LV thrombus w/ LLE ischemia s/p AKA 02/15, pending closure   -Erta( 2/13-2/24)  (Dapto 2/11-2/24)    -picc 2/20   -Undrainable abscess in Abd  -Continue metoprolol/amiodarone   -Holding spironolactone, entresto   -SCDs, SQL 65BID.   -f/u PT recs s/p amputation   -Dispo: ROWAN

## 2023-02-27 NOTE — PROGRESS NOTE ADULT - ASSESSMENT
78M with PMHx of IVDU found unresponsive at his nursing home, resolved after Narcan in the field and brought to [facility]. Found to have PE, atrial flutter, and large LV thrombus on ECHOand CTA showing mid SMA with embolus s/p Ex lap, SMA embolectomy, 80cm SBR, abthera vac left in discontinuity (12/11) and transferred to SICU intubated. S/p second look (12/13) and most recently s/p OR for 3rd look, end-to-end anastomosis of remaining bowel, loop ileostomy and abdomen closure (12/15). Prev stepped down to telemetry. LLE ischemia, AKA delayed by nutritional optimization. Transferred to SICU in the setting of sepsis for HD monitoring. 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    - Maintain surgical dressing, changed today, can reinforce as needed  - Will discuss RTOR plan when medically stable and afebrile  - Remainder of care per primary team  - Vascular will continue to follow

## 2023-02-27 NOTE — PROGRESS NOTE ADULT - SUBJECTIVE AND OBJECTIVE BOX
SUBJECTIVE: Patient seen and examined bedside by chief resident. Patient denies any acute complaints this morning, denies pain. -F/C/CP/SOB/N/V    aMIOdarone    Tablet 100 milliGRAM(s) Oral every 24 hours  DAPTOmycin IVPB 500 milliGRAM(s) IV Intermittent every 24 hours  enoxaparin Injectable 65 milliGRAM(s) SubCutaneous every 12 hours  ertapenem  IVPB 1000 milliGRAM(s) IV Intermittent every 24 hours  metoprolol succinate ER 25 milliGRAM(s) Oral daily      Vital Signs Last 24 Hrs  T(C): 38 (27 Feb 2023 04:50), Max: 38 (27 Feb 2023 04:50)  T(F): 100.4 (27 Feb 2023 04:50), Max: 100.4 (27 Feb 2023 04:50)  HR: 98 (27 Feb 2023 03:38) (94 - 108)  BP: 131/67 (27 Feb 2023 03:38) (104/55 - 136/74)  BP(mean): 91 (27 Feb 2023 03:38) (71 - 98)  RR: 17 (27 Feb 2023 03:38) (17 - 18)  SpO2: 100% (27 Feb 2023 03:38) (95% - 100%)    Parameters below as of 27 Feb 2023 03:38  Patient On (Oxygen Delivery Method): room air      I&O's Detail    26 Feb 2023 07:01  -  27 Feb 2023 07:00  --------------------------------------------------------  IN:    dextrose 5% + lactated ringers: 2300 mL    Fat Emulsion (Fish Oil &amp; Plant Based) 20% Infusion: 83.2 mL    Fat Emulsion (Fish Oil &amp; Plant Based) 20% Infusion: 187.2 mL    Lactated Ringers Bolus: 1000 mL    TPN (Total Parenteral Nutrition): 1909 mL  Total IN: 5479.4 mL    OUT:    Ileostomy (mL): 1700 mL    Voided (mL): 4200 mL  Total OUT: 5900 mL    Total NET: -420.6 mL      27 Feb 2023 07:01  -  27 Feb 2023 08:03  --------------------------------------------------------  IN:    dextrose 5% + lactated ringers: 100 mL    Fat Emulsion (Fish Oil &amp; Plant Based) 20% Infusion: 20.8 mL    TPN (Total Parenteral Nutrition): 83 mL  Total IN: 203.8 mL    OUT:  Total OUT: 0 mL    Total NET: 203.8 mL          General: NAD, resting comfortably in bed  C/V: NSR  Pulm: Nonlabored breathing, no respiratory distress  Abd: soft, NT/ND. Ostomy with gas and stool in bag, with some leakage  Extrem: WWP, no edema, SCDs in place        LABS:                        7.9    10.13 )-----------( 454      ( 27 Feb 2023 05:30 )             24.4     02-27    133<L>  |  96  |  12  ----------------------------<  124<H>  5.1   |  29  |  0.52    Ca    8.3<L>      27 Feb 2023 05:30  Phos  4.2     02-27  Mg     2.2     02-27            RADIOLOGY & ADDITIONAL STUDIES:

## 2023-02-27 NOTE — CHART NOTE - NSCHARTNOTEFT_GEN_A_CORE
Admitting Diagnosis:   Patient is a 78y old  Male who presents with a chief complaint of A flutter (27 Feb 2023 08:02)    Current Nutrition Order:  Regular diet with Ensure Max TID (150 kcal, 30g pro each)  TPN via central line:  275g Dex, 90g AA, 50g 20% SMOF Lipids to provide in total 1795 Kcal, 90g protein, GIR 2.98, 1.4g/kg ABW protein    PO Intake: Good (%) [   ]  Fair (50-75%) [   ] Poor (<25%) [   ]- N/A    GI Issues:   WDL, last BM 2/27  No n/v/d/c noted  Ileostomy (1700 ml/24hrs)- improving outpt  No abdominal discomfort or distention noted    Pain:   No pain noted at this time    Skin Integrity:  Surgical incision noted, Buddy: 12  New left AKA as of 2/15  No edema noted  pressure ulcer: sacral spine stage II pressure ulcer, unstageable upper back pressure ulcer    Labs:   02-27    133<L>  |  96  |  12  ----------------------------<  124<H>  5.1   |  29  |  0.52    Ca    8.3<L>      27 Feb 2023 05:30  Phos  4.2     02-27  Mg     2.2     02-27    Medications:  MEDICATIONS  (STANDING):  aMIOdarone    Tablet 100 milliGRAM(s) Oral every 24 hours  ascorbic acid 500 milliGRAM(s) Oral daily  chlorhexidine 2% Cloths 1 Application(s) Topical daily  cholestyramine Powder (Sugar-Free) 2 Gram(s) Oral three times a day  DAPTOmycin IVPB 500 milliGRAM(s) IV Intermittent every 24 hours  dextrose 5% + lactated ringers. 1000 milliLiter(s) (100 mL/Hr) IV Continuous <Continuous>  diphenoxylate/atropine 2 Tablet(s) Oral every 8 hours  enoxaparin Injectable 65 milliGRAM(s) SubCutaneous every 12 hours  ertapenem  IVPB 1000 milliGRAM(s) IV Intermittent every 24 hours  fat emulsion (Fish Oil and Plant Based) 20% Infusion 0.774 Gm/kG/Day (20.83 mL/Hr) IV Continuous <Continuous>  influenza  Vaccine (HIGH DOSE) 0.7 milliLiter(s) IntraMuscular once  loperamide 4 milliGRAM(s) Oral every 8 hours  metoprolol succinate ER 25 milliGRAM(s) Oral daily  mirtazapine 30 milliGRAM(s) Oral at bedtime  multivitamin 1 Tablet(s) Oral daily  opium Tincture 6 milliGRAM(s) Oral every 12 hours  pantoprazole    Tablet 40 milliGRAM(s) Oral two times a day  Parenteral Nutrition - Adult 1 Each (83 mL/Hr) TPN Continuous <Continuous>  Parenteral Nutrition - Adult 1 Each (83 mL/Hr) TPN Continuous <Continuous>  povidone iodine 10% Solution 1 Application(s) Topical daily  psyllium Powder 1 Packet(s) Oral every 8 hours  zinc sulfate 220 milliGRAM(s) Oral daily    MEDICATIONS  (PRN):  acetaminophen     Tablet .. 650 milliGRAM(s) Oral every 6 hours PRN Temp greater or equal to 38C (100.4F), Mild Pain (1 - 3)  melatonin 3 milliGRAM(s) Oral at bedtime PRN Insomnia  ondansetron Injectable 4 milliGRAM(s) IV Push every 6 hours PRN Nausea and/or Vomiting  sodium chloride 0.9% lock flush 10 milliLiter(s) IV Push every 1 hour PRN Pre/post blood products, medications, blood draw, and to maintain line patency    Weight:  Daily     Daily     Weight Change:     Nutrition Focused Physical Exam: Completed [   ]  Not Pertinent [   ]  Muscle Wasting- Temporal [   ]  Clavicle/Pectoral [   ]  Shoulder/Deltoid [   ]  Scapula [   ]  Interosseous [   ]  Quadriceps [   ]  Gastrocnemius [   ]  Fat Wasting- Orbital [   ]  Buccal [   ]  Triceps [   ]  Rib [   ]  Suspect [PCM] 2/2 to physical assessment, [poor intake], and [wt loss]; please see malnutrition chart note.    Estimated energy needs:     Subjective:     Previous Nutrition Diagnosis:    Active [   ]  Resolved [   ]    If resolved, new PES:     Goal:    Recommendations:    Education:     Risk Level: High [   ] Moderate [   ] Low [   ] Admitting Diagnosis:   Patient is a 78y old  Male who presents with a chief complaint of A flutter (2023 08:02)    Current Nutrition Order:  Regular diet with Ensure Max TID (150 kcal, 30g pro each)  TPN via central line:  275g Dex, 90g AA, 50g 20% SMOF Lipids to provide in total 1795 Kcal, 90g protein, GIR 2.98, 1.4g/kg ABW protein    PO Intake: Good (%) [   ]  Fair (50-75%) [   ] Poor (<25%) [   ]- N/A    GI Issues:   WDL, last BM   No n/v/d/c noted  Ileostomy (1700 ml/24hrs)- improving outpt  No abdominal discomfort or distention noted    Pain:   No pain noted at this time    Skin Integrity:  Surgical incision noted, Buddy: 12  New left AKA as of 2/15  No edema noted  pressure ulcer: sacral spine stage II pressure ulcer, unstageable upper back pressure ulcer    Labs:       133<L>  |  96  |  12  ----------------------------<  124<H>  5.1   |  29  |  0.52    Ca    8.3<L>      2023 05:30  Phos  4.2       Mg     2.2         Medications:  MEDICATIONS  (STANDING):  aMIOdarone    Tablet 100 milliGRAM(s) Oral every 24 hours  ascorbic acid 500 milliGRAM(s) Oral daily  chlorhexidine 2% Cloths 1 Application(s) Topical daily  cholestyramine Powder (Sugar-Free) 2 Gram(s) Oral three times a day  DAPTOmycin IVPB 500 milliGRAM(s) IV Intermittent every 24 hours  dextrose 5% + lactated ringers. 1000 milliLiter(s) (100 mL/Hr) IV Continuous <Continuous>  diphenoxylate/atropine 2 Tablet(s) Oral every 8 hours  enoxaparin Injectable 65 milliGRAM(s) SubCutaneous every 12 hours  ertapenem  IVPB 1000 milliGRAM(s) IV Intermittent every 24 hours  fat emulsion (Fish Oil and Plant Based) 20% Infusion 0.774 Gm/kG/Day (20.83 mL/Hr) IV Continuous <Continuous>  influenza  Vaccine (HIGH DOSE) 0.7 milliLiter(s) IntraMuscular once  loperamide 4 milliGRAM(s) Oral every 8 hours  metoprolol succinate ER 25 milliGRAM(s) Oral daily  mirtazapine 30 milliGRAM(s) Oral at bedtime  multivitamin 1 Tablet(s) Oral daily  opium Tincture 6 milliGRAM(s) Oral every 12 hours  pantoprazole    Tablet 40 milliGRAM(s) Oral two times a day  Parenteral Nutrition - Adult 1 Each (83 mL/Hr) TPN Continuous <Continuous>  Parenteral Nutrition - Adult 1 Each (83 mL/Hr) TPN Continuous <Continuous>  povidone iodine 10% Solution 1 Application(s) Topical daily  psyllium Powder 1 Packet(s) Oral every 8 hours  zinc sulfate 220 milliGRAM(s) Oral daily    MEDICATIONS  (PRN):  acetaminophen     Tablet .. 650 milliGRAM(s) Oral every 6 hours PRN Temp greater or equal to 38C (100.4F), Mild Pain (1 - 3)  melatonin 3 milliGRAM(s) Oral at bedtime PRN Insomnia  ondansetron Injectable 4 milliGRAM(s) IV Push every 6 hours PRN Nausea and/or Vomiting  sodium chloride 0.9% lock flush 10 milliLiter(s) IV Push every 1 hour PRN Pre/post blood products, medications, blood draw, and to maintain line patency    Admitting Anthropometrics:    pounds +-10%  Wt 142 pounds BMI 16.4 %IBW=66%   New adjusted IBW (w/ L AKA): 178lbs/ 81.3kg     Weight Change:   2/15 141.7 pounds   141.9 pounds - dosing wt    136 pounds - Bedscale wt   2/15 142lbs    **PCM:  >> Reports having 1-2 meals/day + ONS. Per pt claims to have 2 ensure/day and 2 nutrament/day - unclear how accurate this is. ?If pt meeting ~75% EER consistently.  >> Does report wt loss.  pounds x6 months ago. Thinks he now is 135 pounds which is consistent with bedscale wt obtained during initial visit by  pounds. Suggest wt loss of 49 pounds/26% body wt loss.  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     Estimated energy needs:  ABW used for calculations as pt between % of IBW (80%) Adjust for age, malnutrition, EF, S/p OR, Pressure ulcer; fluids per team or as recommended below  30-35kcal/k-2275kcal/day   1.4-1.6gm/k-104gm prot/day   30-35kcal/k-2275kcal/day   **Rec upper end of needs     Subjective:  77M with PMHx of IVDU found unresponsive at his nursing home, resolved after Narcan in the field and brought to [facility]. Found to have PE, atrial flutter, and large LV thrombus on echo. Transferred to [facility] for further management. Pt c/o abdominal pain on 12/10 CTA showing mid SMA with embolus. Abnormal distal small bowel loops and cecum with dilatation and pneumatosis suggesting infarcted bowel. One or two tiny foci of intrahepatic portal vein pneumatosis. Segmental infarction upper pole left kidney. Now s/p Ex lap, SMA embolectomy, 80cm SBR, abthera vac left in discontinuity () and transferred to SICU intubated. S/p second look () and most recently s/p OR for 3rd look, end-to-end anastomosis of remaining bowel, loop ileostomy and abdomen closure (12/15). Transferred to CCU on  for cardiac optimization and now transferred back to SICU  for bleeding hemodynamic instability. Echo  shows EF 10-15% with overall hypokinesis. CTA performed demonstrating large hematoma in R abdomen, new hemoperitoneum, and bilateral pleural effusions. Remains in SICU, now extubated with still with limb ischemia to LLE pending amputation and AC held given BRBPR and hematoma; noted pt agreeable for AKA when feasible - AKA currently Pending Cards. Pt s/p DCCV on  (negative LORENZO on ) with successful conversion to NSR. Planning for AKA with vascular surgery once nutrition status is optimize. Team placed PICC 1/10 and initiated supplemental TPN now weaned off with constant encouragement of PO intake. CT A/P  showing RLQ fluid collection. PPN held /2 bacteremia. More recently found to have k. pneumoniae bacteremia likely 2/ undrainable intra-abdominal abscess with clearance of bcx and also now s/p left AKA on 2/15. PICC placed  now plan to restart TPN.     On assessment, pt resting in bed. Currently on regular diet with Ensure Max TID (450 kcal, 90g pro) with TPN running at goal rate meeting 100% of est needs. Pt will cont high output via ostomy (2050 ml/24hrs) with high suspicion of cont malabsorption of PO intake. Pt cont to consume 25-50% of most meals- cont to require constant encouragement from team. RD to follow.     Prior PES: Malnutrition Severe in Chronic state RT presumed intake<EER AEB NFPE, wt loss, PO intake  >>on going  Goal: Pt will meet at least 75% of nutrient needs via feasible route / Show no further s/s of malnutrition    Recommendations:  1. Cont with Regular diet with Ensure Max TID (450 kcal, 90g pro) when medically feasible  2. Due to persistent malabsorption, recommend resuming TPN as medically feasible/appropriate. Recommend; 275g Dex, 90g AA, 50g SMOF lipids to provide 1795 kcal, 90g pro, GIR 2.93, 1.38 g/kg pro ABW. RD to follow.  >> Cont to trend TG weekly  3. Recommend continue MVI daily  4. Monitor Skin, Wts daily, GOC. Pain/GI per team.   >>Cont with opium tincture, imodium and metamucil per team  5. Monitor lytes and replete prn. POC BG q6hrs     Education:  RD cont to encourage adequate PO intake as tolerated.     Risk Level: High [ X ] Moderate [   ] Low [   ].

## 2023-02-27 NOTE — PROGRESS NOTE ADULT - SUBJECTIVE AND OBJECTIVE BOX
INTERVAL HPI/OVERNIGHT EVENTS: Low-grade fevers.    CONSTITUTIONAL:  Negative fever or chills, feels well, good appetite  EYES:  Negative  blurry vision or double vision  CARDIOVASCULAR:  Negative for chest pain or palpitations  RESPIRATORY:  Negative for cough, wheezing, or SOB   GASTROINTESTINAL:  Negative for nausea, vomiting, diarrhea, constipation, or abdominal pain  GENITOURINARY:  Negative frequency, urgency or dysuria  NEUROLOGIC:  No headache, confusion, dizziness, lightheadedness      ANTIBIOTICS/RELEVANT:    MEDICATIONS  (STANDING):  aMIOdarone    Tablet 100 milliGRAM(s) Oral every 24 hours  ascorbic acid 500 milliGRAM(s) Oral daily  chlorhexidine 2% Cloths 1 Application(s) Topical daily  cholestyramine Powder (Sugar-Free) 2 Gram(s) Oral three times a day  DAPTOmycin IVPB 500 milliGRAM(s) IV Intermittent every 24 hours  dextrose 5% + lactated ringers. 1000 milliLiter(s) (100 mL/Hr) IV Continuous <Continuous>  diphenoxylate/atropine 2 Tablet(s) Oral every 8 hours  enoxaparin Injectable 65 milliGRAM(s) SubCutaneous every 12 hours  ertapenem  IVPB 1000 milliGRAM(s) IV Intermittent every 24 hours  fat emulsion (Fish Oil and Plant Based) 20% Infusion 0.774 Gm/kG/Day (20.83 mL/Hr) IV Continuous <Continuous>  influenza  Vaccine (HIGH DOSE) 0.7 milliLiter(s) IntraMuscular once  loperamide 4 milliGRAM(s) Oral every 8 hours  metoprolol succinate ER 25 milliGRAM(s) Oral daily  mirtazapine 30 milliGRAM(s) Oral at bedtime  multivitamin 1 Tablet(s) Oral daily  opium Tincture 6 milliGRAM(s) Oral every 12 hours  pantoprazole    Tablet 40 milliGRAM(s) Oral two times a day  Parenteral Nutrition - Adult 1 Each (83 mL/Hr) TPN Continuous <Continuous>  Parenteral Nutrition - Adult 1 Each (83 mL/Hr) TPN Continuous <Continuous>  povidone iodine 10% Solution 1 Application(s) Topical daily  psyllium Powder 1 Packet(s) Oral every 8 hours  zinc sulfate 220 milliGRAM(s) Oral daily    MEDICATIONS  (PRN):  acetaminophen     Tablet .. 650 milliGRAM(s) Oral every 6 hours PRN Temp greater or equal to 38C (100.4F), Mild Pain (1 - 3)  melatonin 3 milliGRAM(s) Oral at bedtime PRN Insomnia  ondansetron Injectable 4 milliGRAM(s) IV Push every 6 hours PRN Nausea and/or Vomiting  sodium chloride 0.9% lock flush 10 milliLiter(s) IV Push every 1 hour PRN Pre/post blood products, medications, blood draw, and to maintain line patency        Vital Signs Last 24 Hrs  T(C): 36.7 (27 Feb 2023 10:19), Max: 38 (27 Feb 2023 04:50)  T(F): 98.1 (27 Feb 2023 10:19), Max: 100.4 (27 Feb 2023 04:50)  HR: 98 (27 Feb 2023 08:57) (94 - 108)  BP: 124/68 (27 Feb 2023 08:57) (108/56 - 133/75)  BP(mean): 87 (27 Feb 2023 08:57) (77 - 92)  RR: 17 (27 Feb 2023 08:57) (17 - 18)  SpO2: 100% (27 Feb 2023 08:57) (95% - 100%)    Parameters below as of 27 Feb 2023 08:57  Patient On (Oxygen Delivery Method): room air        PHYSICAL EXAM:  Constitutional: NAD  Eyes: JOHN, EOMI  Ear/Nose/Throat: no oral lesion, no sinus tenderness on percussion	  Neck: no JVD, no lymphadenopathy, supple  Respiratory: CTA keerthi  Cardiovascular: S1S2 RRR, no murmurs  Gastrointestinal:soft, (+) BS, no HSM  Extremities:no e/e/c  Vascular: DP Pulse:	right normal; left normal      LABS:                        7.9    10.13 )-----------( 454      ( 27 Feb 2023 05:30 )             24.4     02-27    133<L>  |  96  |  12  ----------------------------<  124<H>  5.1   |  29  |  0.52    Ca    8.3<L>      27 Feb 2023 05:30  Phos  4.2     02-27  Mg     2.2     02-27            MICROBIOLOGY: Culture - Blood (02.25.23 @ 20:15)    Specimen Source: .Blood Blood    Culture Results:   No growth at 1 day.    Culture - Blood in AM (02.23.23 @ 05:30)    Specimen Source: .Blood Blood-Venous    Culture Results:   No growth at 4 days.        RADIOLOGY & ADDITIONAL STUDIES: reviewed

## 2023-02-27 NOTE — PROGRESS NOTE ADULT - ASSESSMENT
77M first presented to [facility] from [facility] due to unresponsiveness (responded to Narcan). At [facility], found to have subsegmental pulmonary embolism in RUL, rapid atrial flutter with severely reduced LVEF with LV thrombus. Transferred to [facility] on 12/7/22 for LORENZO and possible ablation. At [facility], was found to have left leg ischemia (left leg arterial thrombus on LE duplex on 12/8). Was being considered for rhythm control when he developed abdominal pain, CTA (12/10/22) showed embolus in SMA, bowel infarct, requiring ex-lap on 12/11 with SMA embolectomy, 80cm small bowel resection; On 12/13, underwent 2nd look laparotomy, with 80cm small bowel resection, closure with abthera. On 12/15, underwent 3rd look laparotomy, end-to-end anastomosis of remaining bowel, loop ileostomy and abdominal closure. Eventually underwent LORENZO/DCCV on 12/30/22, now in sinus rhythm. More recently found to have k. 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     #bacteremia - k pneumoniae  -cultures cleared, on erta/dapto - but still continues to have fever over night  - reach out to ID again.  Likely due to intraabdominal fluid collection.  Awaiting repeat CT scan.  Blood cultures were taken and results pending.    -management per ID. If fevers become even more persistent, then may have to discuss removing PICC line.  Patient is at risk for candidal infection due to being on TPN.  Fungitell pending.   -abscess not drainable per IR  -s/p PICC line    #s/p left AKA  management per primary team/ vascular  - still needs to be closed.      #anemia  no e/o active bleeding   - he has been intermittently tachycardic for entire hospitalization  - etiology of anemia likely multifactorial    #Suicidal Ideation  #Insomnia   being seen by psych, appreciate input  - Continue mirtazapine 30mg QHS     # Acute Systolic Heart Failure   - Metoprolol succinate 25mg qd - continue  - does not appear volume overloaded at this time.  Lies on his back, and no shortness of breath on exam.      # Atrial Flutter  - s/p DCCV 12/30  - EP recs - uninterrupted AC x 30 days ; January 29th was 30 days post DCCV.   -Continue Lovenox, amiodarone and metoprolol succinate    # Pulmonary embolism (subsegmental RUL)   - After completion of AC for DCCV, would need to continue AC for PE - currently continuing lovenox    # Bowel ischemia - s/p SMA embolectomy; Small bowel resection  - ileostomy in place  - plan of primary team  - loperamide and diphenoxylate atropine    # Severe protein-calorie malnutrition  # Sacral wound   - appreciate input from nutrition  - Wound care per nursing    #hypokalemia  - replete  - repeat labs tomorrow    #DVT ppx - continue lovenox for PE     >35 minutes spent on this encounter, including face to face with patient, care coordination and documentation.   Plan of care discussed with primary team

## 2023-02-27 NOTE — CHART NOTE - NSCHARTNOTEFT_GEN_A_CORE
C/L psychology intern met with pt for 45-minute individual psychotherapy session. Pt was future-oriented, spoke positively about physical therapy but wishing he was making more progress, and processed "loose ends" that he is dealing with in terms of his health such as the lingering fever. He denied hopelessness, denied SI/SIB. He was pleasant and appreciative of session.

## 2023-02-28 NOTE — PROGRESS NOTE ADULT - SUBJECTIVE AND OBJECTIVE BOX
Pre-op Diagnosis: LLE ischemic leg   Procedure: closure of LLE guillotine AKA  Surgeon: Dr. Dean     Consent: in chart                           8.3    9.89  )-----------( 482      ( 2023 05:30 )             25.8         133<L>  |  98  |  16  ----------------------------<  107<H>  5.2   |  26  |  0.48<L>    Ca    9.0      2023 19:05  Phos  4.7       Mg     2.4             Urinalysis Basic - ( 2023 17:52 )    Color: Yellow / Appearance: Clear / S.015 / pH: x  Gluc: x / Ketone: NEGATIVE  / Bili: Negative / Urobili: 0.2 E.U./dL   Blood: x / Protein: NEGATIVE mg/dL / Nitrite: NEGATIVE   Leuk Esterase: NEGATIVE / RBC: x / WBC x   Sq Epi: x / Non Sq Epi: x / Bacteria: x        Type & Screen: pending in AM   (*With most recent within 72hrs of OR)  CXR: done   EKG: done   UA: done     COVID status/date: [ x]Negative/Date: 2023 [ ]Positive/Date:     *With most recent within 48hrs of OR    Is patient on ACE/ARB? [ x]No [ ]Yes   *If yes, please hold any ACE/ARB the day of surgery    Is patient on Lantus at bedtime?  [x ]No [ ]Yes   *If yes, please half the dose the night before OR since patient will be NPO    Does patient have a contrast allergy? [ x]No [ ]Yes  *If yes, please pre-medicate per protocol    Is patient on anticoagulation? [x ]No [ ] Yes  *If yes, please discuss with team when to hold it    Is the patient Female and <56yo [ x]No [ ] Yes  If yes, pregnancy test must be documented in the chart    Is patient on dialysis? [x ]No [ ]Yes  *If yes, please obtain all labs including K level EARLY the day of surgery   *Also, will NOT require IVF past midnight    A/P: 78yMale pre-op for above procedure  1. NPO past midnight, except medications  2. IVF at midnight: as per primary team   3. [ ] Blood on hold, Units:

## 2023-02-28 NOTE — PROGRESS NOTE ADULT - SUBJECTIVE AND OBJECTIVE BOX
Denies any pain.  Wants to eat more instant oatmeal.  No issues reported overnight.     SUBJECTIVE / INTERVAL HPI: Patient seen and examined at bedside. Patient denying chest pain, SOB, palpitations, cough. Patient denies fever, chills, HA, Dizziness, change in vision/hearing, N/V, abdominal pain, diarrhea, constipation, hematochezia/melena, dysuria, hematuria, new onset weakness/numbness, LE pain and/or swelling.    Remaining ROS negative     PHYSICAL EXAM:    General: no acute distress, lying in bed  HEENT: NC/AT;  slightly dry mucous membranes  Cardiovascular: +S1/S2, RRR  Respiratory: CTA B/L; no W/R/R  Gastrointestinal: soft, nondistended  Extremities: RLE wwp, s/p L AKA  Neurological: no focal deficits, speech is fluent    VITAL SIGNS:  Vital Signs Last 24 Hrs  T(C): 37.1 (28 Feb 2023 09:00), Max: 37.6 (27 Feb 2023 14:27)  T(F): 98.8 (28 Feb 2023 09:00), Max: 99.7 (27 Feb 2023 17:22)  HR: 100 (28 Feb 2023 08:35) (84 - 104)  BP: 107/59 (28 Feb 2023 08:35) (107/59 - 145/81)  BP(mean): 73 (28 Feb 2023 08:35) (73 - 106)  RR: 17 (28 Feb 2023 08:35) (17 - 18)  SpO2: 100% (28 Feb 2023 08:35) (100% - 100%)    Parameters below as of 28 Feb 2023 08:35  Patient On (Oxygen Delivery Method): room air          MEDICATIONS:  MEDICATIONS  (STANDING):  aMIOdarone    Tablet 100 milliGRAM(s) Oral every 24 hours  ascorbic acid 500 milliGRAM(s) Oral daily  chlorhexidine 2% Cloths 1 Application(s) Topical daily  cholestyramine Powder (Sugar-Free) 2 Gram(s) Oral three times a day  DAPTOmycin IVPB 500 milliGRAM(s) IV Intermittent every 24 hours  diphenoxylate/atropine 2 Tablet(s) Oral every 8 hours  enoxaparin Injectable 65 milliGRAM(s) SubCutaneous every 12 hours  ertapenem  IVPB 1000 milliGRAM(s) IV Intermittent every 24 hours  fat emulsion (Fish Oil and Plant Based) 20% Infusion 0.7764 Gm/kG/Day (20.83 mL/Hr) IV Continuous <Continuous>  influenza  Vaccine (HIGH DOSE) 0.7 milliLiter(s) IntraMuscular once  loperamide 4 milliGRAM(s) Oral every 8 hours  metoprolol succinate ER 25 milliGRAM(s) Oral daily  mirtazapine 30 milliGRAM(s) Oral at bedtime  multivitamin 1 Tablet(s) Oral daily  opium Tincture 6 milliGRAM(s) Oral every 12 hours  pantoprazole    Tablet 40 milliGRAM(s) Oral two times a day  Parenteral Nutrition - Adult 1 Each (83 mL/Hr) TPN Continuous <Continuous>  Parenteral Nutrition - Adult 1 Each (83 mL/Hr) TPN Continuous <Continuous>  povidone iodine 10% Solution 1 Application(s) Topical daily  psyllium Powder 1 Packet(s) Oral every 8 hours  zinc sulfate 220 milliGRAM(s) Oral daily    MEDICATIONS  (PRN):  acetaminophen     Tablet .. 650 milliGRAM(s) Oral every 6 hours PRN Temp greater or equal to 38C (100.4F), Mild Pain (1 - 3)  melatonin 3 milliGRAM(s) Oral at bedtime PRN Insomnia  ondansetron Injectable 4 milliGRAM(s) IV Push every 6 hours PRN Nausea and/or Vomiting  sodium chloride 0.9% lock flush 10 milliLiter(s) IV Push every 1 hour PRN Pre/post blood products, medications, blood draw, and to maintain line patency      ALLERGIES:  Allergies    No Known Allergies    Intolerances        LABS:                        8.3    9.89  )-----------( 482      ( 28 Feb 2023 05:30 )             25.8     02-28    132<L>  |  98  |  18  ----------------------------<  107<H>  See Note   |  28  |  0.46<L>    Ca    8.6      28 Feb 2023 05:30  Phos  4.7     02-28  Mg     2.4     02-28          CAPILLARY BLOOD GLUCOSE          RADIOLOGY & ADDITIONAL TESTS: Reviewed.

## 2023-02-28 NOTE — PROGRESS NOTE ADULT - ASSESSMENT
78M with PMHx of IVDU found unresponsive at his nursing home, resolved after Narcan in the field and brought to [facility]. Found to have PE, atrial flutter, and large LV thrombus on ECHOand CTA showing mid SMA with embolus s/p Ex lap, SMA embolectomy, 80cm SBR, abthera vac left in discontinuity (12/11) and transferred to SICU intubated. S/p second look (12/13) and most recently s/p OR for 3rd look, end-to-end anastomosis of remaining bowel, loop ileostomy and abdomen closure (12/15). Prev stepped down to telemetry. LLE ischemia, AKA delayed by nutritional optimization. Transferred to SICU in the setting of sepsis for HD monitoring. 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    - Maintain surgical dressing can reinforce as needed  - Remainder of care per primary team  - Vascular will continue to follow

## 2023-02-28 NOTE — PROGRESS NOTE ADULT - ASSESSMENT
77M with PMHx of IVDU found unresponsive at his nursing home, resolved after Narcan in the field and brought to [facility]. Found to have PE, atrial flutter, and large LV thrombus on echo. Transferred to [facility] for further management. Pt c/o abdominal pain on 12/10 CTA showing mid SMA with embolus. Abnormal distal small bowel loops and cecum with dilatation and pneumatosis suggesting infarcted bowel. One or two tiny foci of intrahepatic portal vein pneumatosis. Segmental infarction upper pole left kidney. Now s/p Ex lap, SMA embolectomy, 80cm SBR, abthera vac left in discontinuity (12/11) and transferred to SICU intubated. S/p second look (12/13) and most recently s/p OR for 3rd look, end-to-end anastomosis of remaining bowel, loop ileostomy and abdomen closure (12/15). S/p LORENZO and Cardioversion on 12/30 with cardiology. LLE with AKA delayed by nutritional optimization. 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 Now on telemetry. s/p picc 2/20.     -Tylenol and Oxycodone PRN  -Regular diet with TPN  -Replete ostomy output q6hr  -Continue Immodium/Tincture of Opium.  -LV thrombus w/ LLE ischemia s/p AKA 02/15, pending closure   -Erta( 2/13-2/24)  (Dapto 2/11-2/24)    -picc 2/20   -Undrainable abscess in Abd  -Continue metoprolol/amiodarone   -Holding spironolactone, entresto   -SCDs, SQL 65BID.   -f/u PT recs s/p amputation   -Dispo: [facility]

## 2023-02-28 NOTE — PROGRESS NOTE ADULT - SUBJECTIVE AND OBJECTIVE BOX
SUBJECTIVE: Patient seen and examined bedside by chief resident. Patient denies any acute complaints this morning. -F/C/CP/SOB/N/V    aMIOdarone    Tablet 100 milliGRAM(s) Oral every 24 hours  DAPTOmycin IVPB 500 milliGRAM(s) IV Intermittent every 24 hours  enoxaparin Injectable 65 milliGRAM(s) SubCutaneous every 12 hours  ertapenem  IVPB 1000 milliGRAM(s) IV Intermittent every 24 hours  metoprolol succinate ER 25 milliGRAM(s) Oral daily      Vital Signs Last 24 Hrs  T(C): 36.6 (28 Feb 2023 04:58), Max: 37.6 (27 Feb 2023 14:27)  T(F): 97.9 (28 Feb 2023 04:58), Max: 99.7 (27 Feb 2023 17:22)  HR: 84 (28 Feb 2023 03:58) (84 - 104)  BP: 145/81 (28 Feb 2023 03:58) (114/59 - 145/81)  BP(mean): 106 (28 Feb 2023 03:58) (77 - 106)  RR: 18 (28 Feb 2023 03:58) (17 - 18)  SpO2: 100% (28 Feb 2023 03:58) (100% - 100%)    Parameters below as of 28 Feb 2023 03:58  Patient On (Oxygen Delivery Method): room air      I&O's Detail    27 Feb 2023 07:01  -  28 Feb 2023 07:00  --------------------------------------------------------  IN:    dextrose 5% + lactated ringers: 800 mL    Fat Emulsion (Fish Oil &amp; Plant Based) 20% Infusion: 83.2 mL    Fat Emulsion (Fish Oil &amp; Plant Based) 20% Infusion: 187.2 mL    Lactated Ringers Bolus: 1500 mL    TPN (Total Parenteral Nutrition): 1992 mL  Total IN: 4562.4 mL    OUT:    Ileostomy (mL): 2000 mL    Voided (mL): 3350 mL  Total OUT: 5350 mL    Total NET: -787.6 mL          General: NAD, resting comfortably in bed  C/V: NSR  Pulm: Nonlabored breathing, no respiratory distress  Abd: soft, NT/ND. Ostomy with gas and stool in bag  Extrem: WWP, no edema, SCDs in place        LABS:                        8.3    9.89  )-----------( 482      ( 28 Feb 2023 05:30 )             25.8     02-28    132<L>  |  98  |  18  ----------------------------<  107<H>  See Note   |  28  |  0.46<L>    Ca    8.6      28 Feb 2023 05:30  Phos  4.7     02-28  Mg     2.4     02-28            RADIOLOGY & ADDITIONAL STUDIES:

## 2023-02-28 NOTE — PROGRESS NOTE ADULT - SUBJECTIVE AND OBJECTIVE BOX
SUBJECTIVE:  Doing well this AM. NAEON. Afebrile x24 hrs. Denies n/v. Endorses f/bm. Denies cp/sob. Pain is well controlled. PT as tolerated. Tolerating diet.    MEDICATIONS  (STANDING):  aMIOdarone    Tablet 100 milliGRAM(s) Oral every 24 hours  ascorbic acid 500 milliGRAM(s) Oral daily  chlorhexidine 2% Cloths 1 Application(s) Topical daily  cholestyramine Powder (Sugar-Free) 2 Gram(s) Oral three times a day  DAPTOmycin IVPB 500 milliGRAM(s) IV Intermittent every 24 hours  diphenoxylate/atropine 2 Tablet(s) Oral every 8 hours  enoxaparin Injectable 65 milliGRAM(s) SubCutaneous every 12 hours  ertapenem  IVPB 1000 milliGRAM(s) IV Intermittent every 24 hours  fat emulsion (Fish Oil and Plant Based) 20% Infusion 0.774 Gm/kG/Day (20.83 mL/Hr) IV Continuous <Continuous>  influenza  Vaccine (HIGH DOSE) 0.7 milliLiter(s) IntraMuscular once  lactated ringers Bolus 750 milliLiter(s) IV Bolus once  loperamide 4 milliGRAM(s) Oral every 8 hours  metoprolol succinate ER 25 milliGRAM(s) Oral daily  mirtazapine 30 milliGRAM(s) Oral at bedtime  multivitamin 1 Tablet(s) Oral daily  opium Tincture 6 milliGRAM(s) Oral every 12 hours  pantoprazole    Tablet 40 milliGRAM(s) Oral two times a day  Parenteral Nutrition - Adult 1 Each (83 mL/Hr) TPN Continuous <Continuous>  povidone iodine 10% Solution 1 Application(s) Topical daily  psyllium Powder 1 Packet(s) Oral every 8 hours  zinc sulfate 220 milliGRAM(s) Oral daily    MEDICATIONS  (PRN):  acetaminophen     Tablet .. 650 milliGRAM(s) Oral every 6 hours PRN Temp greater or equal to 38C (100.4F), Mild Pain (1 - 3)  melatonin 3 milliGRAM(s) Oral at bedtime PRN Insomnia  ondansetron Injectable 4 milliGRAM(s) IV Push every 6 hours PRN Nausea and/or Vomiting  sodium chloride 0.9% lock flush 10 milliLiter(s) IV Push every 1 hour PRN Pre/post blood products, medications, blood draw, and to maintain line patency      Vital Signs Last 24 Hrs  T(C): 36.6 (28 Feb 2023 04:58), Max: 37.6 (27 Feb 2023 14:27)  T(F): 97.9 (28 Feb 2023 04:58), Max: 99.7 (27 Feb 2023 17:22)  HR: 100 (28 Feb 2023 08:35) (84 - 104)  BP: 107/59 (28 Feb 2023 08:35) (107/59 - 145/81)  BP(mean): 73 (28 Feb 2023 08:35) (73 - 106)  RR: 17 (28 Feb 2023 08:35) (17 - 18)  SpO2: 100% (28 Feb 2023 08:35) (100% - 100%)    Parameters below as of 28 Feb 2023 08:35  Patient On (Oxygen Delivery Method): room air        Physical Exam:  General: NAD, resting comfortably in bed  Pulmonary: Nonlabored breathing, no respiratory distress  Cardiovascular: NSR  Abdominal: soft, NT/ND  Extremities: WWP, normal strength, L AKA clean and dry  Neuro: A/O x 3, CNs II-XII grossly intact, no focal deficits    I&O's Summary    27 Feb 2023 07:01  -  28 Feb 2023 07:00  --------------------------------------------------------  IN: 4562.4 mL / OUT: 5350 mL / NET: -787.6 mL    28 Feb 2023 07:01  -  28 Feb 2023 10:09  --------------------------------------------------------  IN: 311.4 mL / OUT: 200 mL / NET: 111.4 mL        LABS:                        8.3    9.89  )-----------( 482      ( 28 Feb 2023 05:30 )             25.8     02-28    132<L>  |  98  |  18  ----------------------------<  107<H>  See Note   |  28  |  0.46<L>    Ca    8.6      28 Feb 2023 05:30  Phos  4.7     02-28  Mg     2.4     02-28

## 2023-02-28 NOTE — PROGRESS NOTE ADULT - ASSESSMENT
77M first presented to [facility] from [facility] due to unresponsiveness (responded to Narcan). At [facility], found to have subsegmental pulmonary embolism in RUL, rapid atrial flutter with severely reduced LVEF with LV thrombus. Transferred to [facility] on 12/7/22 for LORENZO and possible ablation. At [facility], was found to have left leg ischemia (left leg arterial thrombus on LE duplex on 12/8). Was being considered for rhythm control when he developed abdominal pain, CTA (12/10/22) showed embolus in SMA, bowel infarct, requiring ex-lap on 12/11 with SMA embolectomy, 80cm small bowel resection; On 12/13, underwent 2nd look laparotomy, with 80cm small bowel resection, closure with abthera. On 12/15, underwent 3rd look laparotomy, end-to-end anastomosis of remaining bowel, loop ileostomy and abdominal closure. Eventually underwent LORENZO/DCCV on 12/30/22, now in sinus rhythm. More recently found to have k. 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     #bacteremia - k pneumoniae  -cultures cleared, on erta/dapto   - reach out to ID again.  Likely due to intraabdominal fluid collection.   -management per ID. If fevers become even more persistent, then may have to discuss removing PICC line.  Patient is at risk for candidal infection due to being on TPN.  Fungitell pending.   -abscess not drainable per IR  -s/p PICC line    #s/p left AKA  management per primary team/ vascular  - still needs to be closed - planning for return to OR with vascular tomorrow.     #anemia  no e/o active bleeding   - he has been intermittently tachycardic for entire hospitalization  - etiology of anemia likely multifactorial    #Suicidal Ideation  #Insomnia   being seen by psych, appreciate input  - Continue mirtazapine 30mg QHS     # Acute Systolic Heart Failure   - Metoprolol succinate 25mg qd - continue  - not volume overloaded.  Holding entresto and spironolactone.  Lies on his back, and no shortness of breath on exam.      # Atrial Flutter  - s/p DCCV 12/30  - EP recs - uninterrupted AC x 30 days ; January 29th was 30 days post DCCV.   -Continue Lovenox, amiodarone and metoprolol succinate    #hyponatremia  - has had intermittent mild hyponatremia. Also frequently refuses lab draws.  - would recommend checking serum osms, urine osms and urine sodium.    - if patient agreeable, would repeat sodium later this evening.  Specimen was hemolyzed as well this morning.     # Pulmonary embolism (subsegmental RUL)   - After completion of AC for DCCV, would need to continue AC for PE - currently continuing lovenox    # Bowel ischemia - s/p SMA embolectomy; Small bowel resection  - ileostomy in place  - plan of primary team  - loperamide and diphenoxylate atropine    # Severe protein-calorie malnutrition  # Sacral wound   - appreciate input from nutrition  - Wound care per nursing      #DVT ppx - continue lovenox for PE     >35 minutes spent on this encounter, including face to face with patient, care coordination and documentation.   Plan of care discussed with primary team

## 2023-02-28 NOTE — CHART NOTE - NSCHARTNOTEFT_GEN_A_CORE
C/L psychology intern met with pt for 45-minute individual psychotherapy session. Pt's mood was stable, pt was talkative and seemed to have more energy compared to past weeks. He acknowledged benefiting from therapy session, needing someone to talk to because he has had a lot on his mind (e.g., existential thoughts regarding racism in the [place] incarceration, treatment as a patient). Writer and pt discussed how he can keep his mind active, including reading. Session ended with pt in stable mood and good behavioral control. No SI/SIB/HI/AH/VH/PI observed or reported.

## 2023-03-01 NOTE — BRIEF OPERATIVE NOTE - OPERATION/FINDINGS
Circumferential incision over distal L thigh. Electrocautery down to femur. Tourniquet inflated. Periosteal elevator for connective tissue. Bone saw resection of femur. Femoral vessels oversewn w/ 0 prolene x2. Hemostasis confirmed. Vicryl fascial and deep dermal closure. Staples for skin. Kerlix and ace.

## 2023-03-01 NOTE — PROGRESS NOTE ADULT - SUBJECTIVE AND OBJECTIVE BOX
Surgeon: Dr. Dean.    Subjective: Pt doing well in the post op period. Denies n/v. Denies cp/sob. Denies f/c.    Vital Signs Last 24 Hrs  T(C): 37.1 (01 Mar 2023 17:56), Max: 37.8 (01 Mar 2023 04:22)  T(F): 98.8 (01 Mar 2023 17:56), Max: 100 (01 Mar 2023 04:22)  HR: 118 (01 Mar 2023 16:53) (92 - 135)  BP: 117/76 (01 Mar 2023 16:53) (97/60 - 163/75)  BP(mean): 89 (01 Mar 2023 16:53) (69 - 114)  RR: 18 (01 Mar 2023 16:53) (10 - 28)  SpO2: 94% (01 Mar 2023 16:53) (94% - 100%)    Parameters below as of 01 Mar 2023 16:53  Patient On (Oxygen Delivery Method): room air        Physical Exam:  General: NAD, resting comfortably in bed  Pulmonary: Nonlabored breathing, no respiratory distress  Cardiovascular: NSR  Abdominal: soft, nontender, nondistended, ostomy in place  Extremities: WWP, normal strength, L AKA site closed w/ clean and intact dressing  Neuro: A/O x 3, CNs II-XII grossly intact, no focal deficits

## 2023-03-01 NOTE — PROGRESS NOTE ADULT - ASSESSMENT
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 Then with new fevers (on daptomycin and ertapenem) since 2/21, shortly following RUE PICC placement for TPN on 2/21. At elevated risk of invasive candidiasis i/s/o TPN exposure however blood cultures remain negative. Per prior discussion with Dr. Lanier, possible RTOR Thursday 3/2 for exploration of RLQ process.  - if/when goes to OR and intra-abdominal purulence/necrosis encountered, would send specimen for gram stain and culture  - advise continue daptomycin 500mg IV q24h and ertapenem 1g IV q24h    Dr. Guzman ID Team 2 assumes care tomorrow

## 2023-03-01 NOTE — PROGRESS NOTE ADULT - SUBJECTIVE AND OBJECTIVE BOX
reporting mouth is dry  denies any abdominal pain or leg pain    PHYSICAL EXAM:  Physical Exam:  General: NAD  ENMT: dry mucous membranes  Resp: no increased WOB, CTAB  CVS: RRR, no m/r/g, no pitting edema  GI: +BS, nt/nd  Neuro: alert and oriented to person place time and situation  Ext: LEFT AKA, RLE       General: no acute distress, lying in bed  HEENT: NC/AT;  slightly dry mucous membranes  Cardiovascular: +S1/S2, RRR  Respiratory: CTA B/L; no W/R/R  Gastrointestinal: soft, nondistended  Extremities: RLE wwp, s/p L AKA  Neurological: no focal deficits, speech is fluent    VITAL SIGNS:  Vital Signs Last 24 Hrs  T(C): 37.1 (28 Feb 2023 09:00), Max: 37.6 (27 Feb 2023 14:27)  T(F): 98.8 (28 Feb 2023 09:00), Max: 99.7 (27 Feb 2023 17:22)  HR: 100 (28 Feb 2023 08:35) (84 - 104)  BP: 107/59 (28 Feb 2023 08:35) (107/59 - 145/81)  BP(mean): 73 (28 Feb 2023 08:35) (73 - 106)  RR: 17 (28 Feb 2023 08:35) (17 - 18)  SpO2: 100% (28 Feb 2023 08:35) (100% - 100%)    Parameters below as of 28 Feb 2023 08:35  Patient On (Oxygen Delivery Method): room air          MEDICATIONS:  MEDICATIONS  (STANDING):  aMIOdarone    Tablet 100 milliGRAM(s) Oral every 24 hours  ascorbic acid 500 milliGRAM(s) Oral daily  chlorhexidine 2% Cloths 1 Application(s) Topical daily  cholestyramine Powder (Sugar-Free) 2 Gram(s) Oral three times a day  DAPTOmycin IVPB 500 milliGRAM(s) IV Intermittent every 24 hours  diphenoxylate/atropine 2 Tablet(s) Oral every 8 hours  enoxaparin Injectable 65 milliGRAM(s) SubCutaneous every 12 hours  ertapenem  IVPB 1000 milliGRAM(s) IV Intermittent every 24 hours  fat emulsion (Fish Oil and Plant Based) 20% Infusion 0.7764 Gm/kG/Day (20.83 mL/Hr) IV Continuous <Continuous>  influenza  Vaccine (HIGH DOSE) 0.7 milliLiter(s) IntraMuscular once  loperamide 4 milliGRAM(s) Oral every 8 hours  metoprolol succinate ER 25 milliGRAM(s) Oral daily  mirtazapine 30 milliGRAM(s) Oral at bedtime  multivitamin 1 Tablet(s) Oral daily  opium Tincture 6 milliGRAM(s) Oral every 12 hours  pantoprazole    Tablet 40 milliGRAM(s) Oral two times a day  Parenteral Nutrition - Adult 1 Each (83 mL/Hr) TPN Continuous <Continuous>  Parenteral Nutrition - Adult 1 Each (83 mL/Hr) TPN Continuous <Continuous>  povidone iodine 10% Solution 1 Application(s) Topical daily  psyllium Powder 1 Packet(s) Oral every 8 hours  zinc sulfate 220 milliGRAM(s) Oral daily    MEDICATIONS  (PRN):  acetaminophen     Tablet .. 650 milliGRAM(s) Oral every 6 hours PRN Temp greater or equal to 38C (100.4F), Mild Pain (1 - 3)  melatonin 3 milliGRAM(s) Oral at bedtime PRN Insomnia  ondansetron Injectable 4 milliGRAM(s) IV Push every 6 hours PRN Nausea and/or Vomiting  sodium chloride 0.9% lock flush 10 milliLiter(s) IV Push every 1 hour PRN Pre/post blood products, medications, blood draw, and to maintain line patency      ALLERGIES:  Allergies    No Known Allergies    Intolerances        LABS:                        8.3    9.89  )-----------( 482      ( 28 Feb 2023 05:30 )             25.8     02-28    132<L>  |  98  |  18  ----------------------------<  107<H>  See Note   |  28  |  0.46<L>    Ca    8.6      28 Feb 2023 05:30  Phos  4.7     02-28  Mg     2.4     02-28          CAPILLARY BLOOD GLUCOSE          RADIOLOGY & ADDITIONAL TESTS: Reviewed. reporting mouth is dry  denies any abdominal pain or leg pain    PHYSICAL EXAM:  Physical Exam:  General: NAD  ENMT: dry mucous membranes  Resp: no increased WOB, CTAB  CVS: RRR, no m/r/g, no pitting edema  GI: +BS, nt/nd + ostomy bag draining yellow liquid contents  Neuro: alert and oriented to person place time and situation  Ext: LEFT AKA, RLE forefoot with Kerlex dressing, C/D/I

## 2023-03-01 NOTE — PROGRESS NOTE ADULT - ASSESSMENT
77M with PMHx of IVDU found unresponsive at his nursing home, resolved after Narcan in the field and brought to [facility]. Found to have PE, atrial flutter, and large LV thrombus on echo. Transferred to [facility] for further management. Pt c/o abdominal pain on 12/10 CTA showing mid SMA with embolus. Abnormal distal small bowel loops and cecum with dilatation and pneumatosis suggesting infarcted bowel. One or two tiny foci of intrahepatic portal vein pneumatosis. Segmental infarction upper pole left kidney. Now s/p Ex lap, SMA embolectomy, 80cm SBR, abthera vac left in discontinuity (12/11) and transferred to SICU intubated. S/p second look (12/13) and most recently s/p OR for 3rd look, end-to-end anastomosis of remaining bowel, loop ileostomy and abdomen closure (12/15). S/p LORENZO and Cardioversion on 12/30 with cardiology. LLE with AKA delayed by nutritional optimization. 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 Now on telemetry. s/p picc 2/20.     -LV thrombus w/ LLE ischemia s/p AKA 02/15, pending closure 3/1  -Tylenol and Oxycodone PRN  -Regular diet with TPN  -Replete ostomy output q6hr  -Continue Immodium/Tincture of Opium.  -Erta( 2/13- ),  (Dapto 2/11- )    -picc 2/20   -Undrainable abscess in Abd  -Continue metoprolol/amiodarone   -Holding spironolactone, entresto   -SCDs, SQL 65BID.   -f/u PT recs s/p amputation   -Dispo: [facility]   -OR today for closure of L AKA with vascular

## 2023-03-01 NOTE — CHART NOTE - NSCHARTNOTEFT_GEN_A_CORE
Notified by RN that patient has fever to 101.7 and is tachycardic to 130s.     Patient went for closure of L AKA with vascular today. In PACU, patient was noted to be shivering, at that time his temp was 99 axillary, -135, /70. They gave ofirmev and 500 cc bolus to treat fever and possible volume depletion, although patient was clinically euvolemic on exam. Patient already on appropriate abx and no evidence of infection in OR.     Patient now examined at approximately 9:30pm. Tmax 101.9F, tachycardic 130s still, , +rigors. Patient complains of shivering. Denies chest pain, sob, dyspnea. Pain was appropriately treated earlier this evening. Patient denies pain at this time. Voiding appropriately. Ostomy functioning. Bcx x 2 in previous ~5 days have been negative.     O:  T(C): 38.7 (03-01-23 @ 20:41), Max: 38.7 (03-01-23 @ 20:41)  T(F): 101.7 (03-01-23 @ 20:41), Max: 101.7 (03-01-23 @ 20:41)  HR: 130 (03-01-23 @ 20:41) (130 - 130)  BP: 165/100 (03-01-23 @ 20:41) (165/100 - 165/100)  RR: 18 (03-01-23 @ 20:41) (18 - 18)  SpO2: 100% (03-01-23 @ 20:41) (100% - 100%)  Wt(kg): --                        8.0    9.81  )-----------( 534      ( 01 Mar 2023 05:30 )             25.0     03-01    132<L>  |  97  |  15  ----------------------------<  91  5.0   |  27  |  0.53    Ca    8.8      01 Mar 2023 05:30  Phos  3.9     03-01  Mg     2.2     03-01        Gen: Awake, Alert, +rigors  C/V: tachycardic, normal sinus rhythm   Pulm: Nonlabored breathing, no respiratory distress  Abd: soft, non distended, non tender, no rebound or guarding   Extrem: s/p L AKA       Will do fever w/ up  - BCx, CXR, UA  Will replete volume with 500 cc NS   Ofirmev  Ice packs  Cooling blanket  Will continue to monitor. Notified by RN that patient has fever to 101.7 and is tachycardic to 130s.     Patient went for closure of L AKA with vascular today. In PACU, patient was noted to be shivering, at that time his temp was 99 axillary, -135, /70. They gave ofirmev and 500 cc bolus to treat fever and possible volume depletion, although patient was clinically euvolemic on exam. Patient already on appropriate abx and no evidence of infection in OR.     Patient now examined at approximately 9:30pm. Tmax 101.9F, tachycardic 130s still, , +rigors. Patient complains of shivering. Denies chest pain, sob, dyspnea. Pain was appropriately treated earlier this evening. Patient denies pain at this time. Voiding appropriately. Ostomy functioning. Bcx x 2 in previous ~5 days have been negative.     O:  T(C): 38.7 (03-01-23 @ 20:41), Max: 38.7 (03-01-23 @ 20:41)  T(F): 101.7 (03-01-23 @ 20:41), Max: 101.7 (03-01-23 @ 20:41)  HR: 130 (03-01-23 @ 20:41) (130 - 130)  BP: 165/100 (03-01-23 @ 20:41) (165/100 - 165/100)  RR: 18 (03-01-23 @ 20:41) (18 - 18)  SpO2: 100% (03-01-23 @ 20:41) (100% - 100%)  Wt(kg): --                        8.0    9.81  )-----------( 534      ( 01 Mar 2023 05:30 )             25.0     03-01    132<L>  |  97  |  15  ----------------------------<  91  5.0   |  27  |  0.53    Ca    8.8      01 Mar 2023 05:30  Phos  3.9     03-01  Mg     2.2     03-01        Gen: Awake, Alert, +rigors  C/V: tachycardic, normal sinus rhythm   Pulm: Nonlabored breathing, no respiratory distress  Abd: soft, non distended, non tender, no rebound or guarding   Extrem: s/p L AKA       Will do fever w/ up  - BCx, CXR, UA with CBC, BMP, Mg, Phosph, Trop  Will replete volume with 500 cc NS   Ofirmev  Ice packs  Cooling blanket  Will continue to monitor.

## 2023-03-01 NOTE — PROGRESS NOTE ADULT - SUBJECTIVE AND OBJECTIVE BOX
INTERVAL HPI/OVERNIGHT EVENTS: RTOR for L AKA closure.    ROS: UTO      ANTIBIOTICS/RELEVANT:    MEDICATIONS  (STANDING):  aMIOdarone    Tablet 100 milliGRAM(s) Oral every 24 hours  ascorbic acid 500 milliGRAM(s) Oral daily  chlorhexidine 2% Cloths 1 Application(s) Topical daily  cholestyramine Powder (Sugar-Free) 2 Gram(s) Oral three times a day  DAPTOmycin IVPB 500 milliGRAM(s) IV Intermittent every 24 hours  diphenoxylate/atropine 2 Tablet(s) Oral every 8 hours  enoxaparin Injectable 65 milliGRAM(s) SubCutaneous every 12 hours  ertapenem  IVPB 1000 milliGRAM(s) IV Intermittent every 24 hours  fat emulsion (Fish Oil and Plant Based) 20% Infusion 0.7764 Gm/kG/Day (20.83 mL/Hr) IV Continuous <Continuous>  influenza  Vaccine (HIGH DOSE) 0.7 milliLiter(s) IntraMuscular once  loperamide 4 milliGRAM(s) Oral every 8 hours  metoprolol succinate ER 25 milliGRAM(s) Oral daily  mirtazapine 30 milliGRAM(s) Oral at bedtime  multivitamin 1 Tablet(s) Oral daily  opium Tincture 6 milliGRAM(s) Oral every 12 hours  pantoprazole    Tablet 40 milliGRAM(s) Oral two times a day  Parenteral Nutrition - Adult 1 Each (83 mL/Hr) TPN Continuous <Continuous>  Parenteral Nutrition - Adult 1 Each (83 mL/Hr) TPN Continuous <Continuous>  povidone iodine 10% Solution 1 Application(s) Topical daily  psyllium Powder 1 Packet(s) Oral every 8 hours  zinc sulfate 220 milliGRAM(s) Oral daily    MEDICATIONS  (PRN):  acetaminophen     Tablet .. 650 milliGRAM(s) Oral every 6 hours PRN Temp greater or equal to 38C (100.4F), Mild Pain (1 - 3)  melatonin 3 milliGRAM(s) Oral at bedtime PRN Insomnia  ondansetron Injectable 4 milliGRAM(s) IV Push every 6 hours PRN Nausea and/or Vomiting  sodium chloride 0.9% lock flush 10 milliLiter(s) IV Push every 1 hour PRN Pre/post blood products, medications, blood draw, and to maintain line patency        Vital Signs Last 24 Hrs  T(C): 37.3 (01 Mar 2023 13:47), Max: 37.8 (01 Mar 2023 04:22)  T(F): 99.2 (01 Mar 2023 13:47), Max: 100 (01 Mar 2023 04:22)  HR: 128 (01 Mar 2023 14:36) (92 - 128)  BP: 163/75 (01 Mar 2023 14:36) (97/60 - 163/75)  BP(mean): 103 (01 Mar 2023 14:36) (69 - 114)  RR: 16 (01 Mar 2023 14:36) (10 - 19)  SpO2: 97% (01 Mar 2023 14:36) (95% - 100%)    Parameters below as of 01 Mar 2023 14:36  Patient On (Oxygen Delivery Method): nasal cannula  O2 Flow (L/min): 10      PHYSICAL EXAM:  deferred patient in OR      LABS:                        8.0    9.81  )-----------( 534      ( 01 Mar 2023 05:30 )             25.0     03-01    132<L>  |  97  |  15  ----------------------------<  91  5.0   |  27  |  0.53    Ca    8.8      01 Mar 2023 05:30  Phos  3.9     03-01  Mg     2.2     03-01      PT/INR - ( 01 Mar 2023 05:30 )   PT: 14.0 sec;   INR: 1.17          PTT - ( 01 Mar 2023 05:30 )  PTT:34.7 sec  Urinalysis Basic - ( 2023 17:52 )    Color: Yellow / Appearance: Clear / S.015 / pH: x  Gluc: x / Ketone: NEGATIVE  / Bili: Negative / Urobili: 0.2 E.U./dL   Blood: x / Protein: NEGATIVE mg/dL / Nitrite: NEGATIVE   Leuk Esterase: NEGATIVE / RBC: x / WBC x   Sq Epi: x / Non Sq Epi: x / Bacteria: x        MICROBIOLOGY: reviewed    RADIOLOGY & ADDITIONAL STUDIES: reviewed

## 2023-03-01 NOTE — PROGRESS NOTE ADULT - ASSESSMENT
78M with PMHx of IVDU found unresponsive at his nursing home, resolved after Narcan in the field and brought to [facility]. Found to have PE, atrial flutter, and large LV thrombus on ECHOand CTA showing mid SMA with embolus s/p Ex lap, SMA embolectomy, 80cm SBR, abthera vac left in discontinuity (12/11) and transferred to SICU intubated. S/p second look (12/13) and most recently s/p OR for 3rd look, end-to-end anastomosis of remaining bowel, loop ileostomy and abdomen closure (12/15). Prev stepped down to telemetry. LLE ischemia, AKA delayed by nutritional optimization. Transferred to SICU in the setting of sepsis for HD monitoring. 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 Doing well.    - Maintain surgical dressing, can reinforce as needed  - Remainder of care per primary team  - Vascular will continue to follow

## 2023-03-01 NOTE — CHART NOTE - NSCHARTNOTEFT_GEN_A_CORE
The writer met with the patient for f/u individual psychotherapy. The writer and patient explored interpersonal relationships and their impact on his emotional experience. The patient was calm, mood appeared stable, future oriented. No SHIIPAVH reported.

## 2023-03-01 NOTE — PROGRESS NOTE ADULT - SUBJECTIVE AND OBJECTIVE BOX
SUBJECTIVE: Pt seen and examined by chief resident. Pt is doing well, resting comfortably on bed. No nausea or vomiting. No complaints at this time.    Vital Signs Last 24 Hrs  T(C): 37.8 (01 Mar 2023 04:22), Max: 37.8 (01 Mar 2023 04:22)  T(F): 100 (01 Mar 2023 04:22), Max: 100 (01 Mar 2023 04:22)  HR: 106 (01 Mar 2023 04:05) (98 - 115)  BP: 117/57 (01 Mar 2023 04:05) (99/54 - 128/66)  BP(mean): 79 (01 Mar 2023 04:05) (69 - 87)  RR: 17 (01 Mar 2023 04:05) (16 - 18)  SpO2: 100% (01 Mar 2023 04:05) (100% - 100%)    Parameters below as of 01 Mar 2023 04:05  Patient On (Oxygen Delivery Method): room air        I&O's Summary    2023 07:01  -  2023 07:00  --------------------------------------------------------  IN: 4562.4 mL / OUT: 5350 mL / NET: -787.6 mL    2023 07:01  -  01 Mar 2023 06:55  --------------------------------------------------------  IN: 3160.6 mL / OUT: 4825 mL / NET: -1664.4 mL        Physical Exam:  General Appearance: Appears well, NAD  Pulmonary: Nonlabored breathing, no respiratory distress  Abdomen: Soft, nondisteded, nontender, ostomy with output  Extremities: WWP, SCD's in place     LABS:                        8.0    9.81  )-----------( 534      ( 01 Mar 2023 05:30 )             25.0     03-01    132<L>  |  97  |  15  ----------------------------<  91  5.0   |  27  |  0.53    Ca    8.8      01 Mar 2023 05:30  Phos  3.9     03-  Mg     2.2     03-01      PT/INR - ( 01 Mar 2023 05:30 )   PT: 14.0 sec;   INR: 1.17          PTT - ( 01 Mar 2023 05:30 )  PTT:34.7 sec  Urinalysis Basic - ( 2023 17:52 )    Color: Yellow / Appearance: Clear / S.015 / pH: x  Gluc: x / Ketone: NEGATIVE  / Bili: Negative / Urobili: 0.2 E.U./dL   Blood: x / Protein: NEGATIVE mg/dL / Nitrite: NEGATIVE   Leuk Esterase: NEGATIVE / RBC: x / WBC x   Sq Epi: x / Non Sq Epi: x / Bacteria: x

## 2023-03-01 NOTE — CHART NOTE - NSCHARTNOTEFT_GEN_A_CORE
Noted shivering when came to see patient calling staff for assistance. States needs to void. No further complaints.  Temp 99 axillary. -135, /79.   Ordered for ofirmev. Intake/output reviewed, intraop crystalloid reviewed. Per medicine note patient euvolemic on exam.  Given above, decided to give 500mL NS bolus (K 5.0 this AM will avoid LR).  Informed ROSAMARIA Mulligan who states not likely sepsis, on appropriate antibiotics and no evidence of infection today in OR.  ROSAMARIA Mulligan instructed the writer to inform anesthesia, discussed with Dr. Epstein.

## 2023-03-01 NOTE — PROGRESS NOTE ADULT - ASSESSMENT
77M first presented to [facility] from [facility] due to unresponsiveness (responded to Narcan). At [facility], found to have subsegmental pulmonary embolism in RUL, rapid atrial flutter with severely reduced LVEF with LV thrombus. Transferred to [facility] on 12/7/22 for LORENZO and possible ablation. At [facility], was found to have left leg ischemia (left leg arterial thrombus on LE duplex on 12/8). Was being considered for rhythm control when he developed abdominal pain, CTA (12/10/22) showed embolus in SMA, bowel infarct, requiring ex-lap on 12/11 with SMA embolectomy, 80cm small bowel resection; On 12/13, underwent 2nd look laparotomy, with 80cm small bowel resection, closure with abthera. On 12/15, underwent 3rd look laparotomy, end-to-end anastomosis of remaining bowel, loop ileostomy and abdominal closure. Eventually underwent LORENZO/DCCV on 12/30/22, now in sinus rhythm. More recently found to have k. 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     #bacteremia - k pneumoniae  -cultures cleared, on erta/dapto   - reach out to ID again.  Likely due to intraabdominal fluid collection.   -management per ID. If fevers become even more persistent, then may have to discuss removing PICC line.  Patient is at risk for candidal infection due to being on TPN.  Fungitell pending.   -abscess not drainable per IR  -s/p PICC line    #s/p left AKA  management per primary team/ vascular  - still needs to be closed - planning for return to OR with vascular tomorrow.     #anemia  no e/o active bleeding   - he has been intermittently tachycardic for entire hospitalization  - etiology of anemia likely multifactorial    #Suicidal Ideation  #Insomnia   being seen by psych, appreciate input  - Continue mirtazapine 30mg QHS     # Acute Systolic Heart Failure   - Metoprolol succinate 25mg qd - continue  - not volume overloaded.  Holding entresto and spironolactone.  Lies on his back, and no shortness of breath on exam.      # Atrial Flutter  - s/p DCCV 12/30  - EP recs - uninterrupted AC x 30 days ; January 29th was 30 days post DCCV.   -Continue Lovenox, amiodarone and metoprolol succinate    #hyponatremia  - has had intermittent mild hyponatremia. Also frequently refuses lab draws.  - would recommend checking serum osms, urine osms and urine sodium.    - if patient agreeable, would repeat sodium later this evening.  Specimen was hemolyzed as well this morning.     # Pulmonary embolism (subsegmental RUL)   - After completion of AC for DCCV, would need to continue AC for PE - currently continuing lovenox    # Bowel ischemia - s/p SMA embolectomy; Small bowel resection  - ileostomy in place  - plan of primary team  - loperamide and diphenoxylate atropine    # Severe protein-calorie malnutrition  # Sacral wound   - appreciate input from nutrition  - Wound care per nursing      #DVT ppx - continue lovenox for PE     >35 minutes spent on this encounter, including face to face with patient, care coordination and documentation.   Plan of care discussed with primary team     77M first presented to [facility] from [facility] due to unresponsiveness (responded to Narcan). At [facility], found to have subsegmental pulmonary embolism in RUL, rapid atrial flutter with severely reduced LVEF with LV thrombus. Transferred to [facility] on 12/7/22 for LORENZO and possible ablation. At [facility], was found to have left leg ischemia (left leg arterial thrombus on LE duplex on 12/8). Was being considered for rhythm control when he developed abdominal pain, CTA (12/10/22) showed embolus in SMA, bowel infarct, requiring ex-lap on 12/11 with SMA embolectomy, 80cm small bowel resection; On 12/13, underwent 2nd look laparotomy, with 80cm small bowel resection, closure with abthera. On 12/15, underwent 3rd look laparotomy, end-to-end anastomosis of remaining bowel, loop ileostomy and abdominal closure. Eventually underwent LORENZO/DCCV on 12/30/22, now in sinus rhythm. More recently found to have k. 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     #bacteremia - k pneumoniae, VRE facium suspected from undrained abdominal abscess  - blood cultures s/s reviewed, most recent bcx from 2/25 NGTD.  - ID reccs reviewed, continuation of dapto and ertapenem  - remains high risk for candidal infection due to TPN and PICC line  -abscess not drainable per IR  -s/p PICC line    #s/p left AKA  - plan for OR today for closure of L AKA with vascular, vascular reccs appreciated.  - agree with holding diuretics and ARB, continue BB and amiodarone    #anemia of chronic disease, stable    #Suicidal Ideation  #Insomnia   - Continue mirtazapine 30mg QHS     # HFrEF, not in exacerbation   - TTE from 2/11 reviewed, normal LVEF   - Metoprolol succinate 25mg qd - continue  - not volume overloaded.  Holding entresto and spironolactone.  Lies on his back, and no shortness of breath on exam.      # Atrial Flutter  - s/p DCCV 12/30  - EP recs - uninterrupted AC x 30 days ; January 29th was 30 days post DCCV.   -Continue full dose Lovenox, amiodarone and metoprolol succinate    #hyponatremia  - has had intermittent mild hyponatremia. Also frequently refuses lab draws.  - would recommend checking serum osms, urine osms and urine sodium.    - if patient agreeable, would repeat sodium later this evening.  Specimen was hemolyzed as well this morning.     # Pulmonary embolism (subsegmental RUL)   - After completion of AC for DCCV, would need to continue AC for PE - currently continuing lovenox    # Bowel ischemia - s/p SMA embolectomy; Small bowel resection  - ileostomy in place  - plan of primary team  - loperamide and diphenoxylate atropine    # Severe protein-calorie malnutrition  # Sacral wound   - appreciate input from nutrition  - Wound care per nursing      #DVT ppx - continue lovenox for PE      77M first presented to [facility] from [facility] due to unresponsiveness (responded to Narcan). At [facility], found to have subsegmental pulmonary embolism in RUL, rapid atrial flutter with severely reduced LVEF with LV thrombus. Transferred to [facility] on 12/7/22 for LORENZO and possible ablation. At [facility], was found to have left leg ischemia (left leg arterial thrombus on LE duplex on 12/8). Was being considered for rhythm control when he developed abdominal pain, CTA (12/10/22) showed embolus in SMA, bowel infarct, requiring ex-lap on 12/11 with SMA embolectomy, 80cm small bowel resection; On 12/13, underwent 2nd look laparotomy, with 80cm small bowel resection, closure with abthera. On 12/15, underwent 3rd look laparotomy, end-to-end anastomosis of remaining bowel, loop ileostomy and abdominal closure. Eventually underwent LORENZO/DCCV on 12/30/22, now in sinus rhythm. More recently found to have k. 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     #bacteremia - k pneumoniae, VRE facium suspected from undrained abdominal abscess  - blood cultures s/s reviewed, most recent bcx from 2/25 NGTD.  - ID reccs reviewed, continuation of dapto and ertapenem  - remains high risk for candidal infection due to TPN and PICC line  -abscess not drainable per IR  -s/p PICC line    #s/p left AKA  - plan for OR today for closure of L AKA with vascular, vascular reccs appreciated.  - agree with holding diuretics and ARB, continue BB and amiodarone    #anemia of chronic disease, stable    #Suicidal Ideation  #Insomnia   - Continue mirtazapine 30mg QHS     # HFrEF, not in exacerbation   - TTE from 2/11 reviewed, normal LVEF   - Metoprolol succinate 25mg qd - continue  - not volume overloaded.  Holding entresto and spironolactone.  Lies on his back, and no shortness of breath on exam.      # Atrial Flutter  - s/p DCCV 12/30  - EP recs - uninterrupted AC x 30 days ; January 29th was 30 days post DCCV.   -Continue full dose Lovenox, amiodarone and metoprolol succinate    #hyponatremia  - labs reviewed, sodium 132 today, urine sodium >20, and serum osm normal; may also be due to GI losses. SWITCH D5 1/2 NS to D5 NS while NPO    -# Pulmonary embolism (subsegmental RUL)   - After completion of AC for DCCV, would need to continue AC for PE - currently continuing lovenox    # Bowel ischemia - s/p SMA embolectomy; Small bowel resection  - ileostomy in place  - plan of primary team  - loperamide and diphenoxylate atropine    # Severe protein-calorie malnutrition  # Sacral wound   - appreciate input from nutrition  - Wound care per nursing      #DVT ppx - continue lovenox for PE   PT consult

## 2023-03-02 NOTE — PROGRESS NOTE ADULT - ASSESSMENT
77M with PMHx of IVDU found unresponsive at his nursing home, resolved after Narcan in the field and brought to [facility]. Found to have PE, atrial flutter, and large LV thrombus on echo. Transferred to [facility] for further management. Pt c/o abdominal pain on 12/10 CTA showing mid SMA with embolus. Abnormal distal small bowel loops and cecum with dilatation and pneumatosis suggesting infarcted bowel. One or two tiny foci of intrahepatic portal vein pneumatosis. Segmental infarction upper pole left kidney. Now s/p Ex lap, SMA embolectomy, 80cm SBR, abthera vac left in discontinuity (12/11) and transferred to SICU intubated. S/p second look (12/13) and most recently s/p OR for 3rd look, end-to-end anastomosis of remaining bowel, loop ileostomy and abdomen closure (12/15). S/p LORENZO and Cardioversion on 12/30 with cardiology. LLE with AKA delayed by nutritional optimization. 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 Now on telemetry. s/p picc 2/20. s/p L AKA closure 3/1.     -LV thrombus w/ LLE ischemia s/p AKA with closure 3/1  -Tylenol and Oxycodone PRN  -Regular diet with TPN  -Replete ostomy output q6hr  -Continue Immodium/Tincture of Opium.  -Erta( 2/13- ),  (Dapto 2/11- )    -picc 2/20   -Undrainable abscess in Abd  -Continue metoprolol/amiodarone   -Holding spironolactone, entresto   -SCDs, SQL [id number].   -f/u PT recs s/p amputation   -Dispo: ROWAN

## 2023-03-02 NOTE — PROVIDER CONTACT NOTE (OTHER) - ASSESSMENT
Patient became tachycardic to 140's, reassessed temp rectally and fever spiked to 101.6, Patient received ice packs, cooling blanket and bolused 500 ml's NS over 30 minutes.

## 2023-03-02 NOTE — PROGRESS NOTE ADULT - SUBJECTIVE AND OBJECTIVE BOX
***Note in progress***    OVERNIGHT EVENTS: NAEO    SUBJECTIVE / INTERVAL HPI: Patient seen and examined at bedside. Patient denying chest pain, SOB, palpitations, cough. Patient denies fever, chills, HA, Dizziness, change in vision/hearing, N/V, abdominal pain, diarrhea, constipation, hematochezia/melena, dysuria, hematuria, new onset weakness/numbness, LE pain and/or swelling.    Remaining ROS negative       PHYSICAL EXAM:    General: WDWN  HEENT: NC/AT; PERRL, anicteric sclera; MMM  Neck: supple  Cardiovascular: +S1/S2, RRR  Respiratory: CTA B/L; no W/R/R  Gastrointestinal: soft, NT/ND; +BSx4  Extremities: WWP; no edema, clubbing or cyanosis  Vascular: 2+ radial, DP/PT pulses B/L  Neurological: AAOx3; no focal deficits  Psychiatric: pleasant mood and affect  Dermatologic: no appreciable wounds or damage to the skin    VITAL SIGNS:  Vital Signs Last 24 Hrs  T(C): 36.6 (02 Mar 2023 08:00), Max: 38.7 (01 Mar 2023 20:41)  T(F): 97.9 (02 Mar 2023 08:00), Max: 101.7 (01 Mar 2023 20:41)  HR: 116 (02 Mar 2023 08:39) (95 - 135)  BP: 147/76 (02 Mar 2023 08:39) (100/65 - 165/100)  BP(mean): 98 (02 Mar 2023 08:39) (70 - 121)  RR: 17 (02 Mar 2023 08:39) (10 - 28)  SpO2: 98% (02 Mar 2023 08:39) (94% - 100%)    Parameters below as of 02 Mar 2023 08:39  Patient On (Oxygen Delivery Method): room air          MEDICATIONS:  MEDICATIONS  (STANDING):  aMIOdarone    Tablet 100 milliGRAM(s) Oral every 24 hours  ascorbic acid 500 milliGRAM(s) Oral daily  chlorhexidine 2% Cloths 1 Application(s) Topical daily  cholestyramine Powder (Sugar-Free) 2 Gram(s) Oral three times a day  DAPTOmycin IVPB 500 milliGRAM(s) IV Intermittent every 24 hours  enoxaparin Injectable 65 milliGRAM(s) SubCutaneous every 12 hours  ertapenem  IVPB 1000 milliGRAM(s) IV Intermittent every 24 hours  fat emulsion (Fish Oil and Plant Based) 20% Infusion 0.7764 Gm/kG/Day (20.8 mL/Hr) IV Continuous <Continuous>  influenza  Vaccine (HIGH DOSE) 0.7 milliLiter(s) IntraMuscular once  loperamide 4 milliGRAM(s) Oral every 8 hours  metoprolol succinate ER 25 milliGRAM(s) Oral daily  mirtazapine 30 milliGRAM(s) Oral at bedtime  multivitamin 1 Tablet(s) Oral daily  opium Tincture 6 milliGRAM(s) Oral every 12 hours  pantoprazole    Tablet 40 milliGRAM(s) Oral two times a day  Parenteral Nutrition - Adult 1 Each (83 mL/Hr) TPN Continuous <Continuous>  Parenteral Nutrition - Adult 1 Each (83 mL/Hr) TPN Continuous <Continuous>  povidone iodine 10% Solution 1 Application(s) Topical daily  psyllium Powder 1 Packet(s) Oral every 8 hours  sodium chloride 0.9% lock flush 10 milliLiter(s) IV Push every 8 hours  zinc sulfate 220 milliGRAM(s) Oral daily    MEDICATIONS  (PRN):  acetaminophen     Tablet .. 650 milliGRAM(s) Oral every 6 hours PRN Temp greater or equal to 38C (100.4F), Mild Pain (1 - 3)  melatonin 3 milliGRAM(s) Oral at bedtime PRN Insomnia  ondansetron Injectable 4 milliGRAM(s) IV Push every 6 hours PRN Nausea and/or Vomiting  oxyCODONE    IR 5 milliGRAM(s) Oral every 6 hours PRN Severe Pain (7 - 10)  sodium chloride 0.9% lock flush 10 milliLiter(s) IV Push every 1 hour PRN Pre/post blood products, medications, blood draw, and to maintain line patency      ALLERGIES:  Allergies    No Known Allergies    Intolerances        LABS:                        9.0    18.15 )-----------( 469      ( 01 Mar 2023 23:50 )             29.4     03-01    131<L>  |  98  |  13  ----------------------------<  117<H>  4.4   |  22  |  0.50    Ca    8.2<L>      01 Mar 2023 23:50  Phos  3.1     03-  Mg     2.0     03-01      PT/INR - ( 01 Mar 2023 05:30 )   PT: 14.0 sec;   INR: 1.17          PTT - ( 01 Mar 2023 05:30 )  PTT:34.7 sec  Urinalysis Basic - ( 02 Mar 2023 03:27 )    Color: Yellow / Appearance: Clear / S.020 / pH: x  Gluc: x / Ketone: NEGATIVE  / Bili: Negative / Urobili: 0.2 E.U./dL   Blood: x / Protein: NEGATIVE mg/dL / Nitrite: NEGATIVE   Leuk Esterase: NEGATIVE / RBC: < 5 /HPF / WBC < 5 /HPF   Sq Epi: x / Non Sq Epi: 0-5 /HPF / Bacteria: Present /HPF      CAPILLARY BLOOD GLUCOSE          RADIOLOGY & ADDITIONAL TESTS: Reviewed. Is having a lot of pain in his left hip.  Denies any chills.  He states that he never knows when he has a fever.  Denies any abdominal pain or shortness of breath.     OVERNIGHT EVENTS: NAEO    SUBJECTIVE / INTERVAL HPI: Patient seen and examined at bedside. Patient denying chest pain, SOB, palpitations, cough. Patient denies fever, chills, HA, Dizziness, change in vision/hearing, N/V, abdominal pain, diarrhea, constipation, hematochezia/melena, dysuria, hematuria, new onset weakness/numbness, LE pain and/or swelling.    Remaining ROS negative       PHYSICAL EXAM:    General: trembling due to severe pain in his left hip  HEENT: NC/AT; MMM  Cardiovascular: +S1/S2, tachycardic, no mrg  Respiratory: CTA B/L; no W/R/R  Gastrointestinal: soft, ostomy in place with liquid output, nontender, nondistended  Extremities: WWP; no edema, s/p L AKA which is wrapped, pressure wounds noted to left hip where he complains of pain, no fluctuance or erythema surrounding the area  Neurological: no focal deficits, no facial asymmetry, speech is fluent  Psychiatric: pleasant mood and affect    VITAL SIGNS:  Vital Signs Last 24 Hrs  T(C): 36.6 (02 Mar 2023 08:00), Max: 38.7 (01 Mar 2023 20:41)  T(F): 97.9 (02 Mar 2023 08:00), Max: 101.7 (01 Mar 2023 20:41)  HR: 116 (02 Mar 2023 08:39) (95 - 135)  BP: 147/76 (02 Mar 2023 08:39) (100/65 - 165/100)  BP(mean): 98 (02 Mar 2023 08:39) (70 - 121)  RR: 17 (02 Mar 2023 08:39) (10 - 28)  SpO2: 98% (02 Mar 2023 08:39) (94% - 100%)    Parameters below as of 02 Mar 2023 08:39  Patient On (Oxygen Delivery Method): room air          MEDICATIONS:  MEDICATIONS  (STANDING):  aMIOdarone    Tablet 100 milliGRAM(s) Oral every 24 hours  ascorbic acid 500 milliGRAM(s) Oral daily  chlorhexidine 2% Cloths 1 Application(s) Topical daily  cholestyramine Powder (Sugar-Free) 2 Gram(s) Oral three times a day  DAPTOmycin IVPB 500 milliGRAM(s) IV Intermittent every 24 hours  enoxaparin Injectable 65 milliGRAM(s) SubCutaneous every 12 hours  ertapenem  IVPB 1000 milliGRAM(s) IV Intermittent every 24 hours  fat emulsion (Fish Oil and Plant Based) 20% Infusion 0.7764 Gm/kG/Day (20.8 mL/Hr) IV Continuous <Continuous>  influenza  Vaccine (HIGH DOSE) 0.7 milliLiter(s) IntraMuscular once  loperamide 4 milliGRAM(s) Oral every 8 hours  metoprolol succinate ER 25 milliGRAM(s) Oral daily  mirtazapine 30 milliGRAM(s) Oral at bedtime  multivitamin 1 Tablet(s) Oral daily  opium Tincture 6 milliGRAM(s) Oral every 12 hours  pantoprazole    Tablet 40 milliGRAM(s) Oral two times a day  Parenteral Nutrition - Adult 1 Each (83 mL/Hr) TPN Continuous <Continuous>  Parenteral Nutrition - Adult 1 Each (83 mL/Hr) TPN Continuous <Continuous>  povidone iodine 10% Solution 1 Application(s) Topical daily  psyllium Powder 1 Packet(s) Oral every 8 hours  sodium chloride 0.9% lock flush 10 milliLiter(s) IV Push every 8 hours  zinc sulfate 220 milliGRAM(s) Oral daily    MEDICATIONS  (PRN):  acetaminophen     Tablet .. 650 milliGRAM(s) Oral every 6 hours PRN Temp greater or equal to 38C (100.4F), Mild Pain (1 - 3)  melatonin 3 milliGRAM(s) Oral at bedtime PRN Insomnia  ondansetron Injectable 4 milliGRAM(s) IV Push every 6 hours PRN Nausea and/or Vomiting  oxyCODONE    IR 5 milliGRAM(s) Oral every 6 hours PRN Severe Pain (7 - 10)  sodium chloride 0.9% lock flush 10 milliLiter(s) IV Push every 1 hour PRN Pre/post blood products, medications, blood draw, and to maintain line patency      ALLERGIES:  Allergies    No Known Allergies    Intolerances        LABS:                        9.0    18.15 )-----------( 469      ( 01 Mar 2023 23:50 )             29.4     03-01    131<L>  |  98  |  13  ----------------------------<  117<H>  4.4   |  22  |  0.50    Ca    8.2<L>      01 Mar 2023 23:50  Phos  3.1       Mg     2.0     -      PT/INR - ( 01 Mar 2023 05:30 )   PT: 14.0 sec;   INR: 1.17          PTT - ( 01 Mar 2023 05:30 )  PTT:34.7 sec  Urinalysis Basic - ( 02 Mar 2023 03:27 )    Color: Yellow / Appearance: Clear / S.020 / pH: x  Gluc: x / Ketone: NEGATIVE  / Bili: Negative / Urobili: 0.2 E.U./dL   Blood: x / Protein: NEGATIVE mg/dL / Nitrite: NEGATIVE   Leuk Esterase: NEGATIVE / RBC: < 5 /HPF / WBC < 5 /HPF   Sq Epi: x / Non Sq Epi: 0-5 /HPF / Bacteria: Present /HPF      CAPILLARY BLOOD GLUCOSE          RADIOLOGY & ADDITIONAL TESTS: Reviewed.

## 2023-03-02 NOTE — PROGRESS NOTE ADULT - SUBJECTIVE AND OBJECTIVE BOX
SUBJECTIVE:  Resolving tachycardia today to 105. WBC up to 22 POD1. Endorses pain to stump. Denies n/v. Denies cp/sob.    MEDICATIONS  (STANDING):  aMIOdarone    Tablet 100 milliGRAM(s) Oral every 24 hours  ascorbic acid 500 milliGRAM(s) Oral daily  chlorhexidine 2% Cloths 1 Application(s) Topical daily  cholestyramine Powder (Sugar-Free) 2 Gram(s) Oral three times a day  DAPTOmycin IVPB 500 milliGRAM(s) IV Intermittent every 24 hours  enoxaparin Injectable 65 milliGRAM(s) SubCutaneous every 12 hours  ertapenem  IVPB 1000 milliGRAM(s) IV Intermittent every 24 hours  fat emulsion (Fish Oil and Plant Based) 20% Infusion 0.7764 Gm/kG/Day (20.83 mL/Hr) IV Continuous <Continuous>  influenza  Vaccine (HIGH DOSE) 0.7 milliLiter(s) IntraMuscular once  loperamide 4 milliGRAM(s) Oral every 8 hours  metoprolol succinate ER 25 milliGRAM(s) Oral daily  mirtazapine 30 milliGRAM(s) Oral at bedtime  multivitamin 1 Tablet(s) Oral daily  opium Tincture 6 milliGRAM(s) Oral every 12 hours  pantoprazole    Tablet 40 milliGRAM(s) Oral two times a day  Parenteral Nutrition - Adult 1 Each (83 mL/Hr) TPN Continuous <Continuous>  Parenteral Nutrition - Adult 1 Each (83 mL/Hr) TPN Continuous <Continuous>  povidone iodine 10% Solution 1 Application(s) Topical daily  psyllium Powder 1 Packet(s) Oral every 8 hours  sodium chloride 0.9% lock flush 10 milliLiter(s) IV Push every 8 hours  zinc sulfate 220 milliGRAM(s) Oral daily    MEDICATIONS  (PRN):  acetaminophen     Tablet .. 650 milliGRAM(s) Oral every 6 hours PRN Temp greater or equal to 38C (100.4F), Mild Pain (1 - 3)  HYDROmorphone  Injectable 0.5 milliGRAM(s) IV Push every 4 hours PRN breakthrough pain  melatonin 3 milliGRAM(s) Oral at bedtime PRN Insomnia  ondansetron Injectable 4 milliGRAM(s) IV Push every 6 hours PRN Nausea and/or Vomiting  oxyCODONE    IR 10 milliGRAM(s) Oral every 4 hours PRN Severe Pain (7 - 10)  oxyCODONE    IR 5 milliGRAM(s) Oral every 4 hours PRN Moderate Pain (4 - 6)  sodium chloride 0.9% lock flush 10 milliLiter(s) IV Push every 1 hour PRN Pre/post blood products, medications, blood draw, and to maintain line patency      Vital Signs Last 24 Hrs  T(C): 36.6 (02 Mar 2023 08:00), Max: 38.7 (01 Mar 2023 20:41)  T(F): 97.9 (02 Mar 2023 08:00), Max: 101.7 (01 Mar 2023 20:41)  HR: 116 (02 Mar 2023 08:39) (105 - 135)  BP: 147/76 (02 Mar 2023 08:39) (103/69 - 165/100)  BP(mean): 98 (02 Mar 2023 08:39) (70 - 121)  RR: 17 (02 Mar 2023 08:39) (10 - 28)  SpO2: 98% (02 Mar 2023 08:39) (94% - 100%)    Parameters below as of 02 Mar 2023 08:39  Patient On (Oxygen Delivery Method): room air        Physical Exam:  General: NAD, resting comfortably in bed  Pulmonary: Nonlabored breathing, no respiratory distress  Cardiovascular: NSR  Abdominal: soft, NT/ND  Extremities: WWP, normal strength, L AKA site clean/dry/intact w/ old blood on gauze  Neuro: A/O x 3, CNs II-XII grossly intact, no focal deficits    I&O's Summary    01 Mar 2023 07:01  -  02 Mar 2023 07:00  --------------------------------------------------------  IN: 5206.1 mL / OUT: 5125 mL / NET: 81.1 mL    02 Mar 2023 07:01  -  02 Mar 2023 12:23  --------------------------------------------------------  IN: 283 mL / OUT: 300 mL / NET: -17 mL        LABS:                        8.1    22.24 )-----------( 540      ( 02 Mar 2023 10:00 )             25.4     03-02    129<L>  |  97  |  12  ----------------------------<  129<H>  4.3   |  24  |  0.38<L>    Ca    8.4      02 Mar 2023 10:00  Phos  2.7     03-02  Mg     1.9     03-02      PT/INR - ( 01 Mar 2023 05:30 )   PT: 14.0 sec;   INR: 1.17          PTT - ( 01 Mar 2023 05:30 )  PTT:34.7 sec  Urinalysis Basic - ( 02 Mar 2023 03:27 )    Color: Yellow / Appearance: Clear / S.020 / pH: x  Gluc: x / Ketone: NEGATIVE  / Bili: Negative / Urobili: 0.2 E.U./dL   Blood: x / Protein: NEGATIVE mg/dL / Nitrite: NEGATIVE   Leuk Esterase: NEGATIVE / RBC: < 5 /HPF / WBC < 5 /HPF   Sq Epi: x / Non Sq Epi: 0-5 /HPF / Bacteria: Present /HPF

## 2023-03-02 NOTE — CHART NOTE - NSCHARTNOTEFT_GEN_A_CORE
(C/L psychology intern) met with pt for 30-minute individual psychotherapy session. Pt reported feeling worried about his recurrent fevers but is trying to stay hopeful that he is going to recover. Pt reported he "wants to live," denies SI/SIB/HI. Session focused on reflecting on pt's progress, both medically and psychologically. Pt expressed gratitude to  and psychiatry/psychology team for working with him throughout his hospitalization.

## 2023-03-02 NOTE — CHART NOTE - NSCHARTNOTEFT_GEN_A_CORE
Admitting Diagnosis:   Patient is a 78y old  Male who presents with a chief complaint of A flutter (02 Mar 2023 14:19)    Current Nutrition Order:  Regular diet with Ensure Max TID (150 kcal, 30g pro each)  TPN via central line:  275g Dex, 90g AA, 50g 20% SMOF Lipids to provide in total 1795 Kcal, 90g protein, GIR 2.98, 1.4g/kg ABW protein    PO Intake: Good (%) [   ]  Fair (50-75%) [   ] Poor (<25%) [   ]- N/A    GI Issues:   WDL, last BM 2/27  No n/v/d/c noted  Ileostomy (1700 ml/24hrs)- improving outpt  No abdominal discomfort or distention noted    Pain:   No pain noted at this time    Skin Integrity:  Surgical incision noted, Buddy: 12  New left AKA as of 2/15  No edema noted  pressure ulcer: sacral spine stage II pressure ulcer, unstageable upper back pressure ulcer    Labs:   03-02    129<L>  |  97  |  12  ----------------------------<  129<H>  4.3   |  24  |  0.38<L>    Ca    8.4      02 Mar 2023 10:00  Phos  2.7     03-02  Mg     1.9     03-02    Medications:  MEDICATIONS  (STANDING):  aMIOdarone    Tablet 100 milliGRAM(s) Oral every 24 hours  ascorbic acid 500 milliGRAM(s) Oral daily  chlorhexidine 2% Cloths 1 Application(s) Topical daily  cholestyramine Powder (Sugar-Free) 2 Gram(s) Oral three times a day  diatrizoate meglumine/diatrizoate sodium. 30 milliLiter(s) Oral once  enoxaparin Injectable 65 milliGRAM(s) SubCutaneous every 12 hours  ertapenem  IVPB 1000 milliGRAM(s) IV Intermittent every 24 hours  fat emulsion (Fish Oil and Plant Based) 20% Infusion 0.7764 Gm/kG/Day (20.83 mL/Hr) IV Continuous <Continuous>  influenza  Vaccine (HIGH DOSE) 0.7 milliLiter(s) IntraMuscular once  loperamide 4 milliGRAM(s) Oral every 8 hours  metoprolol succinate ER 25 milliGRAM(s) Oral daily  mirtazapine 30 milliGRAM(s) Oral at bedtime  multivitamin 1 Tablet(s) Oral daily  opium Tincture 6 milliGRAM(s) Oral every 12 hours  pantoprazole    Tablet 40 milliGRAM(s) Oral two times a day  Parenteral Nutrition - Adult 1 Each (83 mL/Hr) TPN Continuous <Continuous>  Parenteral Nutrition - Adult 1 Each (83 mL/Hr) TPN Continuous <Continuous>  povidone iodine 10% Solution 1 Application(s) Topical daily  psyllium Powder 1 Packet(s) Oral every 8 hours  sodium chloride 0.9% lock flush 10 milliLiter(s) IV Push every 8 hours  zinc sulfate 220 milliGRAM(s) Oral daily    MEDICATIONS  (PRN):  acetaminophen     Tablet .. 650 milliGRAM(s) Oral every 6 hours PRN Temp greater or equal to 38C (100.4F), Mild Pain (1 - 3)  HYDROmorphone  Injectable 0.5 milliGRAM(s) IV Push every 4 hours PRN breakthrough pain  melatonin 3 milliGRAM(s) Oral at bedtime PRN Insomnia  ondansetron Injectable 4 milliGRAM(s) IV Push every 6 hours PRN Nausea and/or Vomiting  oxyCODONE    IR 10 milliGRAM(s) Oral every 4 hours PRN Severe Pain (7 - 10)  oxyCODONE    IR 5 milliGRAM(s) Oral every 4 hours PRN Moderate Pain (4 - 6)  sodium chloride 0.9% lock flush 10 milliLiter(s) IV Push every 1 hour PRN Pre/post blood products, medications, blood draw, and to maintain line patency    Weight:  Daily     Daily     Weight Change:     Nutrition Focused Physical Exam: Completed [   ]  Not Pertinent [   ]  Muscle Wasting- Temporal [   ]  Clavicle/Pectoral [   ]  Shoulder/Deltoid [   ]  Scapula [   ]  Interosseous [   ]  Quadriceps [   ]  Gastrocnemius [   ]  Fat Wasting- Orbital [   ]  Buccal [   ]  Triceps [   ]  Rib [   ]  Suspect [PCM] 2/2 to physical assessment, [poor intake], and [wt loss]; please see malnutrition chart note.    Estimated energy needs:     Subjective:     Previous Nutrition Diagnosis:    Active [   ]  Resolved [   ]    If resolved, new PES:     Goal:    Recommendations:    Education:     Risk Level: High [   ] Moderate [   ] Low [   ] Admitting Diagnosis:   Patient is a 78y old  Male who presents with a chief complaint of A flutter (02 Mar 2023 14:19)    Current Nutrition Order:  Regular diet with Ensure Max TID (150 kcal, 30g pro each)  TPN via central line:  275g Dex, 90g AA, 50g 20% SMOF Lipids to provide in total 1795 Kcal, 90g protein, GIR 2.98, 1.4g/kg ABW protein    PO Intake: Good (%) [   ]  Fair (50-75%) [   ] Poor (<25%) [   ]- consuming <50% of meals    GI Issues:   WDL, last BM   No n/v/c noted +diarrhea noted  Ileostomy (1450 ml/24hrs)- improving outpt  No abdominal discomfort or distention noted    Pain:   5/10 left leg ground pain    Skin Integrity:  Surgical incision noted, Buddy: 12  New left AKA as of 2/15  No edema noted  pressure ulcer: sacral spine stage III pressure ulcer, unstageable upper back pressure ulcer    Labs:       129<L>  |  97  |  12  ----------------------------<  129<H>  4.3   |  24  |  0.38<L>    Ca    8.4      02 Mar 2023 10:00  Phos  2.7       Mg     1.9         Medications:  MEDICATIONS  (STANDING):  aMIOdarone    Tablet 100 milliGRAM(s) Oral every 24 hours  ascorbic acid 500 milliGRAM(s) Oral daily  chlorhexidine 2% Cloths 1 Application(s) Topical daily  cholestyramine Powder (Sugar-Free) 2 Gram(s) Oral three times a day  diatrizoate meglumine/diatrizoate sodium. 30 milliLiter(s) Oral once  enoxaparin Injectable 65 milliGRAM(s) SubCutaneous every 12 hours  ertapenem  IVPB 1000 milliGRAM(s) IV Intermittent every 24 hours  fat emulsion (Fish Oil and Plant Based) 20% Infusion 0.7764 Gm/kG/Day (20.83 mL/Hr) IV Continuous <Continuous>  influenza  Vaccine (HIGH DOSE) 0.7 milliLiter(s) IntraMuscular once  loperamide 4 milliGRAM(s) Oral every 8 hours  metoprolol succinate ER 25 milliGRAM(s) Oral daily  mirtazapine 30 milliGRAM(s) Oral at bedtime  multivitamin 1 Tablet(s) Oral daily  opium Tincture 6 milliGRAM(s) Oral every 12 hours  pantoprazole    Tablet 40 milliGRAM(s) Oral two times a day  Parenteral Nutrition - Adult 1 Each (83 mL/Hr) TPN Continuous <Continuous>  Parenteral Nutrition - Adult 1 Each (83 mL/Hr) TPN Continuous <Continuous>  povidone iodine 10% Solution 1 Application(s) Topical daily  psyllium Powder 1 Packet(s) Oral every 8 hours  sodium chloride 0.9% lock flush 10 milliLiter(s) IV Push every 8 hours  zinc sulfate 220 milliGRAM(s) Oral daily    MEDICATIONS  (PRN):  acetaminophen     Tablet .. 650 milliGRAM(s) Oral every 6 hours PRN Temp greater or equal to 38C (100.4F), Mild Pain (1 - 3)  HYDROmorphone  Injectable 0.5 milliGRAM(s) IV Push every 4 hours PRN breakthrough pain  melatonin 3 milliGRAM(s) Oral at bedtime PRN Insomnia  ondansetron Injectable 4 milliGRAM(s) IV Push every 6 hours PRN Nausea and/or Vomiting  oxyCODONE    IR 10 milliGRAM(s) Oral every 4 hours PRN Severe Pain (7 - 10)  oxyCODONE    IR 5 milliGRAM(s) Oral every 4 hours PRN Moderate Pain (4 - 6)  sodium chloride 0.9% lock flush 10 milliLiter(s) IV Push every 1 hour PRN Pre/post blood products, medications, blood draw, and to maintain line patency    Admitting Anthropometrics:    pounds +-10%  Wt 142 pounds BMI 16.4 %IBW=66%   New adjusted IBW (w/ L AKA): 178lbs/ 81.3kg     Weight Change:   2/15 141.7 pounds   141.9 pounds - dosing wt    136 pounds - Bedscale wt   2/15 142lbs    **PCM:  >> Reports having 1-2 meals/day + ONS. Per pt claims to have 2 ensure/day and 2 nutrament/day - unclear how accurate this is. ?If pt meeting ~75% EER consistently.  >> Does report wt loss.  pounds x6 months ago. Thinks he now is 135 pounds which is consistent with bedscale wt obtained during initial visit by  pounds. Suggest wt loss of 49 pounds/26% body wt loss.  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     Estimated energy needs:  ABW used for calculations as pt between % of IBW (80%) Adjust for age, malnutrition, EF, S/p OR, Pressure ulcer; fluids per team or as recommended below  30-35kcal/k-2275kcal/day   1.4-1.6gm/k-104gm prot/day   30-35kcal/k-2275kcal/day   **Rec upper end of needs     Subjective:   77M with PMHx of IVDU found unresponsive at his nursing home, resolved after Narcan in the field and brought to [facility]. Found to have PE, atrial flutter, and large LV thrombus on echo. Transferred to [facility] for further management. Pt c/o abdominal pain on 12/10 CTA showing mid SMA with embolus. Abnormal distal small bowel loops and cecum with dilatation and pneumatosis suggesting infarcted bowel. One or two tiny foci of intrahepatic portal vein pneumatosis. Segmental infarction upper pole left kidney. Now s/p Ex lap, SMA embolectomy, 80cm SBR, abthera vac left in discontinuity () and transferred to SICU intubated. S/p second look () and most recently s/p OR for 3rd look, end-to-end anastomosis of remaining bowel, loop ileostomy and abdomen closure (12/15). Transferred to CCU on  for cardiac optimization and now transferred back to SICU  for bleeding hemodynamic instability. Echo  shows EF 10-15% with overall hypokinesis. CTA performed demonstrating large hematoma in R abdomen, new hemoperitoneum, and bilateral pleural effusions. Remains in SICU, now extubated with still with limb ischemia to LLE pending amputation and AC held given BRBPR and hematoma; noted pt agreeable for AKA when feasible - AKA currently Pending Cards. Pt s/p DCCV on  (negative LORENZO on ) with successful conversion to NSR. Planning for AKA with vascular surgery once nutrition status is optimize. Team placed PICC 1/10 and initiated supplemental TPN now weaned off with constant encouragement of PO intake. CT A/P  showing RLQ fluid collection. PPN held 2/2 bacteremia. More recently found to have k. 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 PICC placed  now plan to restart TPN. S/p RTOR for L AKA closure 3/1.     On assessment, pt resting in bed. Currently on regular diet with Ensure Max TID (450 kcal, 90g pro) with TPN running at goal rate meeting 100% of est needs. Pt consuming <50% of meals. Cont high output via ostomy (1450 ml/24hrs) with high suspicion of cont malabsorption of PO intake. Pt cont to consume 25-50% of most meals- outpt has been improving. No n/v/c noted. +loose stools via ostomy. RD to follow.     Prior PES: Malnutrition Severe in Chronic state RT presumed intake<EER AEB NFPE, wt loss, PO intake  >>on going  Goal: Pt will meet at least 75% of nutrient needs via feasible route / Show no further s/s of malnutrition    Recommendations:  1. Cont with Regular diet with Ensure Max TID (450 kcal, 90g pro) when medically feasible  2. Due to persistent malabsorption, recommend resuming TPN as medically feasible/appropriate. Recommend; 275g Dex, 90g AA, 50g SMOF lipids to provide 1795 kcal, 90g pro, GIR 2.93, 1.38 g/kg pro ABW. RD to follow.  >> Cont to trend TG weekly  3. Recommend continue MVI daily  4. Monitor Skin, Wts daily, GOC. Pain/GI per team.   >>Cont with opium tincture, imodium and metamucil per team  5. Monitor lytes and replete prn. POC BG q6hrs     Education:  RD cont to encourage adequate PO intake as tolerated.     Risk Level: High [ X ] Moderate [   ] Low [   ].

## 2023-03-02 NOTE — PROGRESS NOTE ADULT - SUBJECTIVE AND OBJECTIVE BOX
INTERVAL HPI/OVERNIGHT EVENTS:    Patient was seen and examined at bedside.  No complaints     CONSTITUTIONAL:  Negative fever or chills, feels well, good appetite  EYES:  Negative  blurry vision or double vision  CARDIOVASCULAR:  Negative for chest pain or palpitations  RESPIRATORY:  Negative for cough, wheezing, or SOB   GASTROINTESTINAL:  Negative for nausea, vomiting, diarrhea, constipation, or abdominal pain  GENITOURINARY:  Negative frequency, urgency or dysuria  NEUROLOGIC:  No headache, confusion, dizziness, lightheadedness      ANTIBIOTICS/RELEVANT:    MEDICATIONS  (STANDING):  aMIOdarone    Tablet 100 milliGRAM(s) Oral every 24 hours  ascorbic acid 500 milliGRAM(s) Oral daily  chlorhexidine 2% Cloths 1 Application(s) Topical daily  cholestyramine Powder (Sugar-Free) 2 Gram(s) Oral three times a day  diatrizoate meglumine/diatrizoate sodium. 30 milliLiter(s) Oral once  enoxaparin Injectable 65 milliGRAM(s) SubCutaneous every 12 hours  ertapenem  IVPB 1000 milliGRAM(s) IV Intermittent every 24 hours  fat emulsion (Fish Oil and Plant Based) 20% Infusion 0.7764 Gm/kG/Day (20.83 mL/Hr) IV Continuous <Continuous>  influenza  Vaccine (HIGH DOSE) 0.7 milliLiter(s) IntraMuscular once  loperamide 4 milliGRAM(s) Oral every 8 hours  metoprolol succinate ER 25 milliGRAM(s) Oral daily  mirtazapine 30 milliGRAM(s) Oral at bedtime  multivitamin 1 Tablet(s) Oral daily  opium Tincture 6 milliGRAM(s) Oral every 12 hours  pantoprazole    Tablet 40 milliGRAM(s) Oral two times a day  Parenteral Nutrition - Adult 1 Each (83 mL/Hr) TPN Continuous <Continuous>  Parenteral Nutrition - Adult 1 Each (83 mL/Hr) TPN Continuous <Continuous>  povidone iodine 10% Solution 1 Application(s) Topical daily  psyllium Powder 1 Packet(s) Oral every 8 hours  sodium chloride 0.9% lock flush 10 milliLiter(s) IV Push every 8 hours  zinc sulfate 220 milliGRAM(s) Oral daily    MEDICATIONS  (PRN):  acetaminophen     Tablet .. 650 milliGRAM(s) Oral every 6 hours PRN Temp greater or equal to 38C (100.4F), Mild Pain (1 - 3)  HYDROmorphone  Injectable 0.5 milliGRAM(s) IV Push every 4 hours PRN breakthrough pain  melatonin 3 milliGRAM(s) Oral at bedtime PRN Insomnia  ondansetron Injectable 4 milliGRAM(s) IV Push every 6 hours PRN Nausea and/or Vomiting  oxyCODONE    IR 10 milliGRAM(s) Oral every 4 hours PRN Severe Pain (7 - 10)  oxyCODONE    IR 5 milliGRAM(s) Oral every 4 hours PRN Moderate Pain (4 - 6)  sodium chloride 0.9% lock flush 10 milliLiter(s) IV Push every 1 hour PRN Pre/post blood products, medications, blood draw, and to maintain line patency        Vital Signs Last 24 Hrs  T(C): 36.6 (02 Mar 2023 08:00), Max: 38.7 (01 Mar 2023 20:41)  T(F): 97.9 (02 Mar 2023 08:00), Max: 101.7 (01 Mar 2023 20:41)  HR: 102 (02 Mar 2023 12:18) (102 - 135)  BP: 101/67 (02 Mar 2023 12:18) (101/67 - 165/100)  BP(mean): 80 (02 Mar 2023 12:18) (70 - 121)  RR: 18 (02 Mar 2023 12:18) (16 - 28)  SpO2: 100% (02 Mar 2023 12:18) (94% - 100%)    Parameters below as of 02 Mar 2023 12:18  Patient On (Oxygen Delivery Method): room air        PHYSICAL EXAM:  Constitutional:  chronically ill appearing   Eyes:JOHN, EOMI  Ear/Nose/Throat: no oral lesion, no sinus tenderness on percussion	  Neck:  supple  Respiratory: CTA keerthi  Cardiovascular: S1S2 RRR, no murmurs  Gastrointestinal:soft, (+) BS, no HSM  Extremities:no edema  Vascular: DP Pulse:	right normal; left normal      LABS:                        8.1    22.24 )-----------( 540      ( 02 Mar 2023 10:00 )             25.4     03-02    129<L>  |  97  |  12  ----------------------------<  129<H>  4.3   |  24  |  0.38<L>    Ca    8.4      02 Mar 2023 10:00  Phos  2.7     03-02  Mg     1.9     03-02      PT/INR - ( 01 Mar 2023 05:30 )   PT: 14.0 sec;   INR: 1.17          PTT - ( 01 Mar 2023 05:30 )  PTT:34.7 sec  Urinalysis Basic - ( 02 Mar 2023 03:27 )    Color: Yellow / Appearance: Clear / S.020 / pH: x  Gluc: x / Ketone: NEGATIVE  / Bili: Negative / Urobili: 0.2 E.U./dL   Blood: x / Protein: NEGATIVE mg/dL / Nitrite: NEGATIVE   Leuk Esterase: NEGATIVE / RBC: < 5 /HPF / WBC < 5 /HPF   Sq Epi: x / Non Sq Epi: 0-5 /HPF / Bacteria: Present /HPF        MICROBIOLOGY:    Culture - Blood (23 @ 23:50)    Specimen Source: .Blood Blood-Venous    Culture Results:   No growth at 12 hours        RADIOLOGY & ADDITIONAL STUDIES:    < from: CT Abdomen and Pelvis w/ Oral Cont (23 @ 17:52) >    IMPRESSION:  Limited without intravenous contrast. Contrast opacified small bowel   anterior to the right lower quadrant collection. Increased pelvic   ascites. Other incidental comments as above.    < end of copied text >

## 2023-03-02 NOTE — PROGRESS NOTE ADULT - ASSESSMENT
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 Then with new fevers (on daptomycin and ertapenem) since 2/21, shortly following RUE PICC placement for TPN on 2/21. At elevated risk of invasive candidiasis i/s/o TPN exposure however blood cultures remain negative.  Suspect continued signs of infection are due to undrained abscess    Recommend:  1.  Continue Daptomycin 500 mg IV daily  2.  Continue Ertapenem 1 gram IV daily  3.  if/when goes to OR and intra-abdominal purulence/necrosis encountered, would send specimen for gram stain and culture    ID team 2 will follow

## 2023-03-02 NOTE — PROVIDER CONTACT NOTE (OTHER) - ACTION/TREATMENT ORDERED:
Patient received ice packs, cooling blanket and bolused 500 ml's NS over 30 minutes. Patient was not due to receive tylenol again until 0400. Managed with fluids and cooling treatments until next dose

## 2023-03-02 NOTE — PROGRESS NOTE ADULT - ASSESSMENT
78M with PMHx of IVDU found unresponsive at his nursing home, resolved after Narcan in the field and brought to [facility]. Found to have PE, atrial flutter, and large LV thrombus on ECHOand CTA showing mid SMA with embolus s/p Ex lap, SMA embolectomy, 80cm SBR, abthera vac left in discontinuity (12/11) and transferred to SICU intubated. S/p second look (12/13) and most recently s/p OR for 3rd look, end-to-end anastomosis of remaining bowel, loop ileostomy and abdomen closure (12/15). Prev stepped down to telemetry. LLE ischemia, AKA delayed by nutritional optimization. Transferred to SICU in the setting of sepsis for HD monitoring. 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 Pt w/ resolving tachycardia since OR and transient WBC, surgical site clean and intact w/o signs of infection.    - Maintain surgical dressing, can reinforce as needed  - Remainder of care per primary team  - Vascular will continue to follow

## 2023-03-02 NOTE — PROVIDER CONTACT NOTE (OTHER) - SITUATION
Patient is s/p L AKA 2/15 and s/p L AKA closure 3/1. Patient tachycardic to 140's and febrile to 101.6. Patient febrile at 2100 & got tylenol + ice pack and cooling blankets. fever resolved & returned

## 2023-03-02 NOTE — PROGRESS NOTE ADULT - ASSESSMENT
77M first presented to [facility] from [facility] due to unresponsiveness (responded to Narcan). At [facility], found to have subsegmental pulmonary embolism in RUL, rapid atrial flutter with severely reduced LVEF with LV thrombus. Transferred to [facility] on 12/7/22 for LORENZO and possible ablation. At [facility], was found to have left leg ischemia (left leg arterial thrombus on LE duplex on 12/8). Was being considered for rhythm control when he developed abdominal pain, CTA (12/10/22) showed embolus in SMA, bowel infarct, requiring ex-lap on 12/11 with SMA embolectomy, 80cm small bowel resection; On 12/13, underwent 2nd look laparotomy, with 80cm small bowel resection, closure with abthera. On 12/15, underwent 3rd look laparotomy, end-to-end anastomosis of remaining bowel, loop ileostomy and abdominal closure. Eventually underwent LORENZO/DCCV on 12/30/22, now in sinus rhythm. More recently found to have k. 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     #bacteremia - k pneumoniae, VRE facium suspected from undrained abdominal abscess  - blood cultures s/s reviewed, most recent bcx from 2/25 NGTD.  - ID reccs reviewed, continuation of dapto and ertapenem  - remains high risk for candidal infection due to TPN and PICC line  -abscess not drainable per IR  -s/p PICC line    #s/p left AKA  - plan for OR today for closure of L AKA with vascular, vascular reccs appreciated.  - agree with holding diuretics and ARB, continue BB and amiodarone    #anemia of chronic disease, stable    #Suicidal Ideation  #Insomnia   - Continue mirtazapine 30mg QHS     # HFrEF, not in exacerbation   - TTE from 2/11 reviewed, normal LVEF   - Metoprolol succinate 25mg qd - continue  - not volume overloaded.  Holding entresto and spironolactone.  Lies on his back, and no shortness of breath on exam.      # Atrial Flutter  - s/p DCCV 12/30  - EP recs - uninterrupted AC x 30 days ; January 29th was 30 days post DCCV.   -Continue full dose Lovenox, amiodarone and metoprolol succinate    #hyponatremia  - labs reviewed, sodium 132 today, urine sodium >20, and serum osm normal; may also be due to GI losses. SWITCH D5 1/2 NS to D5 NS while NPO    -# Pulmonary embolism (subsegmental RUL)   - After completion of AC for DCCV, would need to continue AC for PE - currently continuing lovenox    # Bowel ischemia - s/p SMA embolectomy; Small bowel resection  - ileostomy in place  - plan of primary team  - loperamide and diphenoxylate atropine    # Severe protein-calorie malnutrition  # Sacral wound   - appreciate input from nutrition  - Wound care per nursing      #DVT ppx - continue lovenox for PE   PT consult   77M first presented to [facility] from [facility] due to unresponsiveness (responded to Narcan). At [facility], found to have subsegmental pulmonary embolism in RUL, rapid atrial flutter with severely reduced LVEF with LV thrombus. Transferred to [facility] on 12/7/22 for LORENZO and possible ablation. At [facility], was found to have left leg ischemia (left leg arterial thrombus on LE duplex on 12/8). Was being considered for rhythm control when he developed abdominal pain, CTA (12/10/22) showed embolus in SMA, bowel infarct, requiring ex-lap on 12/11 with SMA embolectomy, 80cm small bowel resection; On 12/13, underwent 2nd look laparotomy, with 80cm small bowel resection, closure with abthera. On 12/15, underwent 3rd look laparotomy, end-to-end anastomosis of remaining bowel, loop ileostomy and abdominal closure. Eventually underwent LORENZO/DCCV on 12/30/22, now in sinus rhythm. More recently found to have k. 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  Still continues to have fevers.  Will wait until     #anemia of chronic disease  - remains stable, and has not required any transfusions recently    #Suicidal Ideation  #Insomnia   - Continue mirtazapine 30mg QHS   - psychiatry following, and appreciate any input    # HFrEF, not in exacerbation   - TTE from 2/11 reviewed, normal LVEF   - Metoprolol succinate 25mg qd - continue  - not volume overloaded.  continuing to hold entresto and spironolactone.      # Atrial Flutter  - s/p DCCV 12/30  - EP recs - uninterrupted AC x 30 days ; January 29th was 30 days post DCCV.   -Continue full dose Lovenox, amiodarone and metoprolol succinate    #hyponatremia  - hyponatremia is stable, and no longer NPO.  Not on any continuous hypotonic infusions.     -# Pulmonary embolism (subsegmental RUL)   - After completion of AC for DCCV, would need to continue AC for PE - currently continuing lovenox    # Bowel ischemia - s/p SMA embolectomy; Small bowel resection  - ileostomy in place, possibly returning to OR for reversal next week  - plan of primary team  - continue loperamide and diphenoxylate atropine    # Severe protein-calorie malnutrition  # Sacral wound   - appreciate input from nutrition  - Wound care per nursing      #DVT ppx - continue lovenox for PE   PT consult    35 minutes spent on this encounter, including face to face with patient, care coordination and documentation.   Plan of care discussed with primary team.

## 2023-03-02 NOTE — PROGRESS NOTE ADULT - SUBJECTIVE AND OBJECTIVE BOX
SUBJECTIVE: Pt seen and examined by chief resident. Pt c/o left leg pain after AKA closure. Rectal probe in place temp 97.     Vital Signs Last 24 Hrs  T(C): 37 (02 Mar 2023 05:49), Max: 38.7 (01 Mar 2023 20:41)  T(F): 98.6 (02 Mar 2023 05:49), Max: 101.7 (01 Mar 2023 20:41)  HR: 128 (02 Mar 2023 03:54) (92 - 135)  BP: 143/77 (02 Mar 2023 03:54) (97/60 - 165/100)  BP(mean): 103 (02 Mar 2023 03:54) (69 - 121)  RR: 18 (02 Mar 2023 03:54) (10 - 28)  SpO2: 94% (02 Mar 2023 03:54) (94% - 100%)    Parameters below as of 02 Mar 2023 03:54  Patient On (Oxygen Delivery Method): room air        I&O's Summary    2023 07:01  -  01 Mar 2023 07:00  --------------------------------------------------------  IN: 3160.6 mL / OUT: 4825 mL / NET: -1664.4 mL    01 Mar 2023 07:01  -  02 Mar 2023 06:56  --------------------------------------------------------  IN: 5206.1 mL / OUT: 5125 mL / NET: 81.1 mL        Physical Exam:  General Appearance: Appears well, NAD  Pulmonary: Nonlabored breathing, no respiratory distress  Cardiovascular: sinus tach 115  Abdomen: Soft, nondisteded, nontender, osotmy in place  Extremities: s/p L AKA dressing in place clean dry and intact     LABS:                        9.0    18.15 )-----------( 469      ( 01 Mar 2023 23:50 )             29.4         131<L>  |  98  |  13  ----------------------------<  117<H>  4.4   |  22  |  0.50    Ca    8.2<L>      01 Mar 2023 23:50  Phos  3.1     03-  Mg     2.0     03-      PT/INR - ( 01 Mar 2023 05:30 )   PT: 14.0 sec;   INR: 1.17          PTT - ( 01 Mar 2023 05:30 )  PTT:34.7 sec  Urinalysis Basic - ( 02 Mar 2023 03:27 )    Color: Yellow / Appearance: Clear / S.020 / pH: x  Gluc: x / Ketone: NEGATIVE  / Bili: Negative / Urobili: 0.2 E.U./dL   Blood: x / Protein: NEGATIVE mg/dL / Nitrite: NEGATIVE   Leuk Esterase: NEGATIVE / RBC: < 5 /HPF / WBC < 5 /HPF   Sq Epi: x / Non Sq Epi: 0-5 /HPF / Bacteria: Present /HPF

## 2023-03-03 NOTE — CHART NOTE - NSCHARTNOTEFT_GEN_A_CORE
The writer met with the patient for f/u individual psychotherapy. The writer and patient explored the patient's response to his current hospitalization. The patient was more quiet today, though appears more confident and future-oriented. No SHIIPAVH reported. As the patient appears to be progressing, the writer provided the patient with a word search and newspaper to increase behavioral activity. The patient was meaningfully engaged and receptive to these interventions. The patient ended the session in good behavioral and emotional control.

## 2023-03-03 NOTE — PROGRESS NOTE ADULT - SUBJECTIVE AND OBJECTIVE BOX
Pain is better controlled.  Denies any shortness of breath or abdominal pain.  No events reported overnight.     ***Note in progress***    OVERNIGHT EVENTS: NAEO    SUBJECTIVE / INTERVAL HPI: Patient seen and examined at bedside. Patient denying chest pain, SOB, palpitations, cough. Patient denies fever, chills, HA, Dizziness, change in vision/hearing, N/V, abdominal pain, diarrhea, constipation, hematochezia/melena, dysuria, hematuria, new onset weakness/numbness, LE pain and/or swelling.    Remaining ROS negative       PHYSICAL EXAM:    General: WDWN  HEENT: NC/AT; PERRL, anicteric sclera; MMM  Neck: supple  Cardiovascular: +S1/S2, RRR  Respiratory: CTA B/L; no W/R/R  Gastrointestinal: soft, NT/ND; +BSx4  Extremities: WWP; no edema, clubbing or cyanosis  Vascular: 2+ radial, DP/PT pulses B/L  Neurological: AAOx3; no focal deficits  Psychiatric: pleasant mood and affect  Dermatologic: no appreciable wounds or damage to the skin    VITAL SIGNS:  Vital Signs Last 24 Hrs  T(C): 36.7 (03 Mar 2023 13:55), Max: 37.8 (03 Mar 2023 04:58)  T(F): 98 (03 Mar 2023 13:55), Max: 100 (03 Mar 2023 04:58)  HR: 108 (03 Mar 2023 16:45) (108 - 132)  BP: 128/65 (03 Mar 2023 16:45) (111/60 - 136/76)  BP(mean): 93 (03 Mar 2023 16:45) (76 - 101)  RR: 17 (03 Mar 2023 11:58) (17 - 18)  SpO2: 95% (03 Mar 2023 16:45) (95% - 100%)    Parameters below as of 03 Mar 2023 16:45  Patient On (Oxygen Delivery Method): room air          MEDICATIONS:  MEDICATIONS  (STANDING):  aMIOdarone    Tablet 100 milliGRAM(s) Oral every 24 hours  ascorbic acid 500 milliGRAM(s) Oral daily  chlorhexidine 2% Cloths 1 Application(s) Topical daily  cholestyramine Powder (Sugar-Free) 2 Gram(s) Oral three times a day  DAPTOmycin IVPB 500 milliGRAM(s) IV Intermittent every 24 hours  enoxaparin Injectable 65 milliGRAM(s) SubCutaneous every 12 hours  fat emulsion (Fish Oil and Plant Based) 20% Infusion 0.7764 Gm/kG/Day (20.83 mL/Hr) IV Continuous <Continuous>  influenza  Vaccine (HIGH DOSE) 0.7 milliLiter(s) IntraMuscular once  loperamide 4 milliGRAM(s) Oral every 8 hours  metoprolol succinate ER 25 milliGRAM(s) Oral daily  mirtazapine 30 milliGRAM(s) Oral at bedtime  multivitamin 1 Tablet(s) Oral daily  opium Tincture 6 milliGRAM(s) Oral every 12 hours  pantoprazole    Tablet 40 milliGRAM(s) Oral two times a day  Parenteral Nutrition - Adult 1 Each (83 mL/Hr) TPN Continuous <Continuous>  Parenteral Nutrition - Adult 1 Each (83 mL/Hr) TPN Continuous <Continuous>  povidone iodine 10% Solution 1 Application(s) Topical daily  psyllium Powder 1 Packet(s) Oral every 8 hours  sodium chloride 0.9% lock flush 10 milliLiter(s) IV Push every 8 hours  zinc sulfate 220 milliGRAM(s) Oral daily    MEDICATIONS  (PRN):  acetaminophen     Tablet .. 650 milliGRAM(s) Oral every 6 hours PRN Temp greater or equal to 38C (100.4F), Mild Pain (1 - 3)  HYDROmorphone  Injectable 0.5 milliGRAM(s) IV Push every 4 hours PRN breakthrough pain  melatonin 3 milliGRAM(s) Oral at bedtime PRN Insomnia  ondansetron Injectable 4 milliGRAM(s) IV Push every 6 hours PRN Nausea and/or Vomiting  oxyCODONE    IR 10 milliGRAM(s) Oral every 4 hours PRN Severe Pain (7 - 10)  oxyCODONE    IR 5 milliGRAM(s) Oral every 4 hours PRN Moderate Pain (4 - 6)  sodium chloride 0.9% lock flush 10 milliLiter(s) IV Push every 1 hour PRN Pre/post blood products, medications, blood draw, and to maintain line patency      ALLERGIES:  Allergies    No Known Allergies    Intolerances        LABS:                        7.9    23.43 )-----------( 579      ( 03 Mar 2023 10:58 )             25.4     03-03    131<L>  |  97  |  15  ----------------------------<  99  4.1   |  24  |  0.46<L>    Ca    8.8      03 Mar 2023 10:58  Phos  2.2     -  Mg     2.1     -        Urinalysis Basic - ( 02 Mar 2023 03:27 )    Color: Yellow / Appearance: Clear / S.020 / pH: x  Gluc: x / Ketone: NEGATIVE  / Bili: Negative / Urobili: 0.2 E.U./dL   Blood: x / Protein: NEGATIVE mg/dL / Nitrite: NEGATIVE   Leuk Esterase: NEGATIVE / RBC: < 5 /HPF / WBC < 5 /HPF   Sq Epi: x / Non Sq Epi: 0-5 /HPF / Bacteria: Present /HPF      CAPILLARY BLOOD GLUCOSE          RADIOLOGY & ADDITIONAL TESTS: Reviewed. Pain is better controlled.  Denies any shortness of breath or abdominal pain.  No events reported overnight.     OVERNIGHT EVENTS: NAEO      Remaining ROS negative       PHYSICAL EXAM:    General: in no acute distress, lying in bed, denies any acute pain  HEENT: NC/AT; MMM  Cardiovascular: +S1/S2, tachycardic, no mrg  Respiratory: CTA B/L; no W/R/R  Gastrointestinal: soft, ostomy in place with liquid output, nontender, nondistended  Extremities: WWP; no edema, s/p L AKA which is wrapped, pressure wounds noted to left hip where he complains of pain, no fluctuance or erythema surrounding the area  Neurological: no focal deficits, no facial asymmetry, speech is fluent  Psychiatric: pleasant mood and affect    VITAL SIGNS:  Vital Signs Last 24 Hrs  T(C): 36.7 (03 Mar 2023 13:55), Max: 37.8 (03 Mar 2023 04:58)  T(F): 98 (03 Mar 2023 13:55), Max: 100 (03 Mar 2023 04:58)  HR: 108 (03 Mar 2023 16:45) (108 - 132)  BP: 128/65 (03 Mar 2023 16:45) (111/60 - 136/76)  BP(mean): 93 (03 Mar 2023 16:45) (76 - 101)  RR: 17 (03 Mar 2023 11:58) (17 - 18)  SpO2: 95% (03 Mar 2023 16:45) (95% - 100%)    Parameters below as of 03 Mar 2023 16:45  Patient On (Oxygen Delivery Method): room air          MEDICATIONS:  MEDICATIONS  (STANDING):  aMIOdarone    Tablet 100 milliGRAM(s) Oral every 24 hours  ascorbic acid 500 milliGRAM(s) Oral daily  chlorhexidine 2% Cloths 1 Application(s) Topical daily  cholestyramine Powder (Sugar-Free) 2 Gram(s) Oral three times a day  DAPTOmycin IVPB 500 milliGRAM(s) IV Intermittent every 24 hours  enoxaparin Injectable 65 milliGRAM(s) SubCutaneous every 12 hours  fat emulsion (Fish Oil and Plant Based) 20% Infusion 0.7764 Gm/kG/Day (20.83 mL/Hr) IV Continuous <Continuous>  influenza  Vaccine (HIGH DOSE) 0.7 milliLiter(s) IntraMuscular once  loperamide 4 milliGRAM(s) Oral every 8 hours  metoprolol succinate ER 25 milliGRAM(s) Oral daily  mirtazapine 30 milliGRAM(s) Oral at bedtime  multivitamin 1 Tablet(s) Oral daily  opium Tincture 6 milliGRAM(s) Oral every 12 hours  pantoprazole    Tablet 40 milliGRAM(s) Oral two times a day  Parenteral Nutrition - Adult 1 Each (83 mL/Hr) TPN Continuous <Continuous>  Parenteral Nutrition - Adult 1 Each (83 mL/Hr) TPN Continuous <Continuous>  povidone iodine 10% Solution 1 Application(s) Topical daily  psyllium Powder 1 Packet(s) Oral every 8 hours  sodium chloride 0.9% lock flush 10 milliLiter(s) IV Push every 8 hours  zinc sulfate 220 milliGRAM(s) Oral daily    MEDICATIONS  (PRN):  acetaminophen     Tablet .. 650 milliGRAM(s) Oral every 6 hours PRN Temp greater or equal to 38C (100.4F), Mild Pain (1 - 3)  HYDROmorphone  Injectable 0.5 milliGRAM(s) IV Push every 4 hours PRN breakthrough pain  melatonin 3 milliGRAM(s) Oral at bedtime PRN Insomnia  ondansetron Injectable 4 milliGRAM(s) IV Push every 6 hours PRN Nausea and/or Vomiting  oxyCODONE    IR 10 milliGRAM(s) Oral every 4 hours PRN Severe Pain (7 - 10)  oxyCODONE    IR 5 milliGRAM(s) Oral every 4 hours PRN Moderate Pain (4 - 6)  sodium chloride 0.9% lock flush 10 milliLiter(s) IV Push every 1 hour PRN Pre/post blood products, medications, blood draw, and to maintain line patency      ALLERGIES:  Allergies    No Known Allergies    Intolerances        LABS:                        7.9    23.43 )-----------( 579      ( 03 Mar 2023 10:58 )             25.4     03-03    131<L>  |  97  |  15  ----------------------------<  99  4.1   |  24  |  0.46<L>    Ca    8.8      03 Mar 2023 10:58  Phos  2.2     03-03  Mg     2.1     03-03        Urinalysis Basic - ( 02 Mar 2023 03:27 )    Color: Yellow / Appearance: Clear / S.020 / pH: x  Gluc: x / Ketone: NEGATIVE  / Bili: Negative / Urobili: 0.2 E.U./dL   Blood: x / Protein: NEGATIVE mg/dL / Nitrite: NEGATIVE   Leuk Esterase: NEGATIVE / RBC: < 5 /HPF / WBC < 5 /HPF   Sq Epi: x / Non Sq Epi: 0-5 /HPF / Bacteria: Present /HPF      CAPILLARY BLOOD GLUCOSE          RADIOLOGY & ADDITIONAL TESTS: Reviewed.

## 2023-03-03 NOTE — PROGRESS NOTE ADULT - SUBJECTIVE AND OBJECTIVE BOX
SUBJECTIVE:  Doing well this AM. NAEON. Denies n/v. Endorses f/bm. Denies cp/sob. Pain is well controlled. Tolerating diet.    MEDICATIONS  (STANDING):  aMIOdarone    Tablet 100 milliGRAM(s) Oral every 24 hours  ascorbic acid 500 milliGRAM(s) Oral daily  chlorhexidine 2% Cloths 1 Application(s) Topical daily  cholestyramine Powder (Sugar-Free) 2 Gram(s) Oral three times a day  DAPTOmycin IVPB 500 milliGRAM(s) IV Intermittent every 24 hours  enoxaparin Injectable 65 milliGRAM(s) SubCutaneous every 12 hours  ertapenem  IVPB 1000 milliGRAM(s) IV Intermittent every 24 hours  fat emulsion (Fish Oil and Plant Based) 20% Infusion 0.7764 Gm/kG/Day (20.83 mL/Hr) IV Continuous <Continuous>  influenza  Vaccine (HIGH DOSE) 0.7 milliLiter(s) IntraMuscular once  loperamide 4 milliGRAM(s) Oral every 8 hours  metoprolol succinate ER 25 milliGRAM(s) Oral daily  mirtazapine 30 milliGRAM(s) Oral at bedtime  multivitamin 1 Tablet(s) Oral daily  opium Tincture 6 milliGRAM(s) Oral every 12 hours  pantoprazole    Tablet 40 milliGRAM(s) Oral two times a day  Parenteral Nutrition - Adult 1 Each (83 mL/Hr) TPN Continuous <Continuous>  Parenteral Nutrition - Adult 1 Each (83 mL/Hr) TPN Continuous <Continuous>  povidone iodine 10% Solution 1 Application(s) Topical daily  psyllium Powder 1 Packet(s) Oral every 8 hours  sodium chloride 0.9% lock flush 10 milliLiter(s) IV Push every 8 hours  zinc sulfate 220 milliGRAM(s) Oral daily    MEDICATIONS  (PRN):  acetaminophen     Tablet .. 650 milliGRAM(s) Oral every 6 hours PRN Temp greater or equal to 38C (100.4F), Mild Pain (1 - 3)  HYDROmorphone  Injectable 0.5 milliGRAM(s) IV Push every 4 hours PRN breakthrough pain  melatonin 3 milliGRAM(s) Oral at bedtime PRN Insomnia  ondansetron Injectable 4 milliGRAM(s) IV Push every 6 hours PRN Nausea and/or Vomiting  oxyCODONE    IR 10 milliGRAM(s) Oral every 4 hours PRN Severe Pain (7 - 10)  oxyCODONE    IR 5 milliGRAM(s) Oral every 4 hours PRN Moderate Pain (4 - 6)  sodium chloride 0.9% lock flush 10 milliLiter(s) IV Push every 1 hour PRN Pre/post blood products, medications, blood draw, and to maintain line patency      Vital Signs Last 24 Hrs  T(C): 36.8 (03 Mar 2023 09:03), Max: 37.8 (03 Mar 2023 04:58)  T(F): 98.2 (03 Mar 2023 09:03), Max: 100 (03 Mar 2023 04:58)  HR: 114 (03 Mar 2023 09:02) (102 - 132)  BP: 111/60 (03 Mar 2023 09:02) (101/67 - 130/80)  BP(mean): 76 (03 Mar 2023 09:02) (76 - 100)  RR: 18 (03 Mar 2023 09:02) (18 - 18)  SpO2: 95% (03 Mar 2023 09:02) (95% - 100%)    Parameters below as of 03 Mar 2023 09:02  Patient On (Oxygen Delivery Method): room air        Physical Exam:  General: NAD, resting comfortably in bed  Pulmonary: Nonlabored breathing, no respiratory distress  Cardiovascular: NSR  Abdominal: soft, NT/ND  Extremities: WWP, normal strength, L AKA dressing clean/dry/intact   Neuro: A/O x 3, CNs II-XII grossly intact, no focal deficits    I&O's Summary    02 Mar 2023 07:  -  03 Mar 2023 07:00  --------------------------------------------------------  IN: 3622 mL / OUT: 3650 mL / NET: -28 mL    03 Mar 2023 07:01  -  03 Mar 2023 11:12  --------------------------------------------------------  IN: 103.8 mL / OUT: 200 mL / NET: -96.2 mL        LABS:                        7.9    23.43 )-----------( 579      ( 03 Mar 2023 10:58 )             25.4     03-02    129<L>  |  97  |  12  ----------------------------<  129<H>  4.3   |  24  |  0.38<L>    Ca    8.4      02 Mar 2023 10:00  Phos  2.7     03-02  Mg     1.9     03-02        Urinalysis Basic - ( 02 Mar 2023 03:27 )    Color: Yellow / Appearance: Clear / S.020 / pH: x  Gluc: x / Ketone: NEGATIVE  / Bili: Negative / Urobili: 0.2 E.U./dL   Blood: x / Protein: NEGATIVE mg/dL / Nitrite: NEGATIVE   Leuk Esterase: NEGATIVE / RBC: < 5 /HPF / WBC < 5 /HPF   Sq Epi: x / Non Sq Epi: 0-5 /HPF / Bacteria: Present /HPF

## 2023-03-03 NOTE — PROGRESS NOTE ADULT - ASSESSMENT
77M first presented to [facility] from [facility] due to unresponsiveness (responded to Narcan). At [facility], found to have subsegmental pulmonary embolism in RUL, rapid atrial flutter with severely reduced LVEF with LV thrombus. Transferred to [facility] on 12/7/22 for LORENZO and possible ablation. At [facility], was found to have left leg ischemia (left leg arterial thrombus on LE duplex on 12/8). Was being considered for rhythm control when he developed abdominal pain, CTA (12/10/22) showed embolus in SMA, bowel infarct, requiring ex-lap on 12/11 with SMA embolectomy, 80cm small bowel resection; On 12/13, underwent 2nd look laparotomy, with 80cm small bowel resection, closure with abthera. On 12/15, underwent 3rd look laparotomy, end-to-end anastomosis of remaining bowel, loop ileostomy and abdominal closure. Eventually underwent LORENZO/DCCV on 12/30/22, now in sinus rhythm. More recently found to have k. 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  Still continues to have fevers.  Will wait until     #anemia of chronic disease  - remains stable, and has not required any transfusions recently    #Suicidal Ideation  #Insomnia   - Continue mirtazapine 30mg QHS   - psychiatry following, and appreciate any input    # HFrEF, not in exacerbation   - TTE from 2/11 reviewed, normal LVEF   - Metoprolol succinate 25mg qd - continue  - not volume overloaded.  continuing to hold entresto and spironolactone.      # Atrial Flutter  - s/p DCCV 12/30  - EP recs - uninterrupted AC x 30 days ; January 29th was 30 days post DCCV.   -Continue full dose Lovenox, amiodarone and metoprolol succinate    #hyponatremia  - hyponatremia is stable, and no longer NPO.  Not on any continuous hypotonic infusions.     -# Pulmonary embolism (subsegmental RUL)   - After completion of AC for DCCV, would need to continue AC for PE - currently continuing lovenox    # Bowel ischemia - s/p SMA embolectomy; Small bowel resection  - ileostomy in place, possibly returning to OR for reversal next week  - plan of primary team  - continue loperamide and diphenoxylate atropine    # Severe protein-calorie malnutrition  # Sacral wound   - appreciate input from nutrition  - Wound care per nursing      #DVT ppx - continue lovenox for PE   PT consult    35 minutes spent on this encounter, including face to face with patient, care coordination and documentation.   Plan of care discussed with primary team.

## 2023-03-03 NOTE — PROGRESS NOTE ADULT - ASSESSMENT
78M with PMHx of IVDU found unresponsive at his nursing home, resolved after Narcan in the field and brought to [facility]. Found to have PE, atrial flutter, and large LV thrombus on ECHOand CTA showing mid SMA with embolus s/p Ex lap, SMA embolectomy, 80cm SBR, abthera vac left in discontinuity (12/11) and transferred to SICU intubated. S/p second look (12/13) and most recently s/p OR for 3rd look, end-to-end anastomosis of remaining bowel, loop ileostomy and abdomen closure (12/15). Prev stepped down to telemetry. LLE ischemia, AKA delayed by nutritional optimization. Transferred to SICU in the setting of sepsis for HD monitoring. 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 Pt w/ resolving tachycardia since OR and transient WBC, surgical site clean and intact w/o signs of infection.    - Maintain surgical dressing, can reinforce as needed  - Remainder of care per primary team  - Vascular will continue to follow

## 2023-03-03 NOTE — PROGRESS NOTE ADULT - SUBJECTIVE AND OBJECTIVE BOX
SUBJECTIVE:  Patient seen and examined on AM rounds with chief resident. Overnight, patient was received 3 fluid boluses (6pm, midnight, 6am). He remained tachycardic (HR 130s, sinus tach) and afebrile. This morning, he is feeling well. He has no active complaints.     MEDICATIONS  (STANDING):  aMIOdarone    Tablet 100 milliGRAM(s) Oral every 24 hours  ascorbic acid 500 milliGRAM(s) Oral daily  chlorhexidine 2% Cloths 1 Application(s) Topical daily  cholestyramine Powder (Sugar-Free) 2 Gram(s) Oral three times a day  DAPTOmycin IVPB 500 milliGRAM(s) IV Intermittent every 24 hours  enoxaparin Injectable 65 milliGRAM(s) SubCutaneous every 12 hours  ertapenem  IVPB 1000 milliGRAM(s) IV Intermittent every 24 hours  fat emulsion (Fish Oil and Plant Based) 20% Infusion 0.7764 Gm/kG/Day (20.83 mL/Hr) IV Continuous <Continuous>  influenza  Vaccine (HIGH DOSE) 0.7 milliLiter(s) IntraMuscular once  loperamide 4 milliGRAM(s) Oral every 8 hours  metoprolol succinate ER 25 milliGRAM(s) Oral daily  mirtazapine 30 milliGRAM(s) Oral at bedtime  multivitamin 1 Tablet(s) Oral daily  opium Tincture 6 milliGRAM(s) Oral every 12 hours  pantoprazole    Tablet 40 milliGRAM(s) Oral two times a day  Parenteral Nutrition - Adult 1 Each (83 mL/Hr) TPN Continuous <Continuous>  povidone iodine 10% Solution 1 Application(s) Topical daily  psyllium Powder 1 Packet(s) Oral every 8 hours  sodium chloride 0.9% lock flush 10 milliLiter(s) IV Push every 8 hours  zinc sulfate 220 milliGRAM(s) Oral daily    MEDICATIONS  (PRN):  acetaminophen     Tablet .. 650 milliGRAM(s) Oral every 6 hours PRN Temp greater or equal to 38C (100.4F), Mild Pain (1 - 3)  HYDROmorphone  Injectable 0.5 milliGRAM(s) IV Push every 4 hours PRN breakthrough pain  melatonin 3 milliGRAM(s) Oral at bedtime PRN Insomnia  ondansetron Injectable 4 milliGRAM(s) IV Push every 6 hours PRN Nausea and/or Vomiting  oxyCODONE    IR 10 milliGRAM(s) Oral every 4 hours PRN Severe Pain (7 - 10)  oxyCODONE    IR 5 milliGRAM(s) Oral every 4 hours PRN Moderate Pain (4 - 6)  sodium chloride 0.9% lock flush 10 milliLiter(s) IV Push every 1 hour PRN Pre/post blood products, medications, blood draw, and to maintain line patency      Vital Signs Last 24 Hrs  T(C): 37.8 (03 Mar 2023 04:58), Max: 37.8 (03 Mar 2023 04:58)  T(F): 100 (03 Mar 2023 04:58), Max: 100 (03 Mar 2023 04:58)  HR: 116 (03 Mar 2023 04:40) (102 - 132)  BP: 115/66 (03 Mar 2023 04:40) (101/67 - 147/76)  BP(mean): 76 (03 Mar 2023 04:40) (76 - 100)  RR: 18 (03 Mar 2023 04:40) (17 - 18)  SpO2: 100% (03 Mar 2023 04:40) (98% - 100%)    Parameters below as of 03 Mar 2023 04:40  Patient On (Oxygen Delivery Method): room air    Physical Exam:  General: NAD, resting comfortably in bed  Pulmonary: Nonlabored breathing, no respiratory distress  Cardiovascular: NSR  Abdominal: soft, NT/ND, ileostomy with output   Extremities: WWP, normal strength    I&O's Summary    02 Mar 2023 07:01  -  03 Mar 2023 07:00  --------------------------------------------------------  IN: 3518.1 mL / OUT: 3650 mL / NET: -131.9 mL        LABS:                        8.1    22.24 )-----------( 540      ( 02 Mar 2023 10:00 )             25.4     03-02    129<L>  |  97  |  12  ----------------------------<  129<H>  4.3   |  24  |  0.38<L>    Ca    8.4      02 Mar 2023 10:00  Phos  2.7     03-02  Mg     1.9     03-02        Urinalysis Basic - ( 02 Mar 2023 03:27 )    Color: Yellow / Appearance: Clear / S.020 / pH: x  Gluc: x / Ketone: NEGATIVE  / Bili: Negative / Urobili: 0.2 E.U./dL   Blood: x / Protein: NEGATIVE mg/dL / Nitrite: NEGATIVE   Leuk Esterase: NEGATIVE / RBC: < 5 /HPF / WBC < 5 /HPF   Sq Epi: x / Non Sq Epi: 0-5 /HPF / Bacteria: Present /HPF

## 2023-03-03 NOTE — PROGRESS NOTE ADULT - ASSESSMENT
77M with PMHx of IVDU found unresponsive at his nursing home, resolved after Narcan in the field and brought to [facility]. Found to have PE, atrial flutter, and large LV thrombus on echo. Transferred to [facility] for further management. Pt c/o abdominal pain on 12/10 CTA showing mid SMA with embolus. Abnormal distal small bowel loops and cecum with dilatation and pneumatosis suggesting infarcted bowel. One or two tiny foci of intrahepatic portal vein pneumatosis. Segmental infarction upper pole left kidney. Now s/p Ex lap, SMA embolectomy, 80cm SBR, abthera vac left in discontinuity (12/11) and transferred to SICU intubated. S/p second look (12/13) and most recently s/p OR for 3rd look, end-to-end anastomosis of remaining bowel, loop ileostomy and abdomen closure (12/15). S/p LORENZO and Cardioversion on 12/30 with cardiology. LLE with AKA delayed by nutritional optimization. 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 Now on telemetry. s/p picc 2/20. s/p L AKA closure 3/1.     -LV thrombus w/ LLE ischemia s/p AKA 02/15,  closure 3/1  -Tylenol and Oxycodone PRN  -Regular diet with TPN  -Replete ostomy output q6hr  -Continue Immodium/Tincture of Opium.  -Erta( 2/13- ),  (Dapto 2/11- )    -picc 2/20   -Undrainable abscess in Abd  -Continue metoprolol/amiodarone   -Holding spironolactone, entresto   -SCDs, SQL 65BID.   -f/u PT recs s/p amputation   -Plan for ileostomy reversal next week with Dr. Lanier   -Dispo: [facility]      77M with PMHx of IVDU found unresponsive at his nursing home, resolved after Narcan in the field and brought to [facility]. Found to have PE, atrial flutter, and large LV thrombus on echo. Transferred to [facility] for further management. Pt c/o abdominal pain on 12/10 CTA showing mid SMA with embolus. Abnormal distal small bowel loops and cecum with dilatation and pneumatosis suggesting infarcted bowel. One or two tiny foci of intrahepatic portal vein pneumatosis. Segmental infarction upper pole left kidney. Now s/p Ex lap, SMA embolectomy, 80cm SBR, abthera vac left in discontinuity (12/11) and transferred to SICU intubated. S/p second look (12/13) and most recently s/p OR for 3rd look, end-to-end anastomosis of remaining bowel, loop ileostomy and abdomen closure (12/15). S/p LORENZO and Cardioversion on 12/30 with cardiology. LLE with AKA delayed by nutritional optimization. 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 Now on telemetry. s/p picc 2/20. s/p L AKA closure 3/1.     -LV thrombus w/ LLE ischemia s/p AKA 02/15,  closure 3/1  -Send c diff   -Tylenol and Oxycodone PRN  -Regular diet with TPN  -Replete ostomy output q6hr  -Continue Immodium/Tincture of Opium.  -Erta( 2/13- ),  (Dapto 2/11- )    -picc 2/20   -Undrainable abscess in Abd  -Continue metoprolol/amiodarone   -Holding spironolactone, entresto   -SCDs, SQL 65BID.   -f/u PT recs s/p amputation   -Plan for ileostomy reversal next week with Dr. Lanier   -Dispo: [facility]

## 2023-03-03 NOTE — PROGRESS NOTE ADULT - SUBJECTIVE AND OBJECTIVE BOX
INTERVAL HPI/OVERNIGHT EVENTS:    Patient was seen and examined at bedside.  No events. Tmax: 100    CONSTITUTIONAL:  Negative fever or chills, feels well, good appetite  EYES:  Negative  blurry vision or double vision  CARDIOVASCULAR:  Negative for chest pain or palpitations  RESPIRATORY:  Negative for cough, wheezing, or SOB   GASTROINTESTINAL:  Negative for nausea, vomiting, diarrhea, constipation, or abdominal pain  GENITOURINARY:  Negative frequency, urgency or dysuria  NEUROLOGIC:  No headache, confusion, dizziness, lightheadedness      ANTIBIOTICS/RELEVANT:    MEDICATIONS  (STANDING):  aMIOdarone    Tablet 100 milliGRAM(s) Oral every 24 hours  ascorbic acid 500 milliGRAM(s) Oral daily  chlorhexidine 2% Cloths 1 Application(s) Topical daily  cholestyramine Powder (Sugar-Free) 2 Gram(s) Oral three times a day  DAPTOmycin IVPB 500 milliGRAM(s) IV Intermittent every 24 hours  enoxaparin Injectable 65 milliGRAM(s) SubCutaneous every 12 hours  ertapenem  IVPB 1000 milliGRAM(s) IV Intermittent every 24 hours  fat emulsion (Fish Oil and Plant Based) 20% Infusion 0.7764 Gm/kG/Day (20.83 mL/Hr) IV Continuous <Continuous>  fat emulsion (Fish Oil and Plant Based) 20% Infusion 0.7764 Gm/kG/Day (20.83 mL/Hr) IV Continuous <Continuous>  influenza  Vaccine (HIGH DOSE) 0.7 milliLiter(s) IntraMuscular once  loperamide 4 milliGRAM(s) Oral every 8 hours  metoprolol succinate ER 25 milliGRAM(s) Oral daily  mirtazapine 30 milliGRAM(s) Oral at bedtime  multivitamin 1 Tablet(s) Oral daily  opium Tincture 6 milliGRAM(s) Oral every 12 hours  pantoprazole    Tablet 40 milliGRAM(s) Oral two times a day  Parenteral Nutrition - Adult 1 Each (83 mL/Hr) TPN Continuous <Continuous>  Parenteral Nutrition - Adult 1 Each (83 mL/Hr) TPN Continuous <Continuous>  povidone iodine 10% Solution 1 Application(s) Topical daily  psyllium Powder 1 Packet(s) Oral every 8 hours  sodium chloride 0.9% lock flush 10 milliLiter(s) IV Push every 8 hours  zinc sulfate 220 milliGRAM(s) Oral daily    MEDICATIONS  (PRN):  acetaminophen     Tablet .. 650 milliGRAM(s) Oral every 6 hours PRN Temp greater or equal to 38C (100.4F), Mild Pain (1 - 3)  HYDROmorphone  Injectable 0.5 milliGRAM(s) IV Push every 4 hours PRN breakthrough pain  melatonin 3 milliGRAM(s) Oral at bedtime PRN Insomnia  ondansetron Injectable 4 milliGRAM(s) IV Push every 6 hours PRN Nausea and/or Vomiting  oxyCODONE    IR 10 milliGRAM(s) Oral every 4 hours PRN Severe Pain (7 - 10)  oxyCODONE    IR 5 milliGRAM(s) Oral every 4 hours PRN Moderate Pain (4 - 6)  sodium chloride 0.9% lock flush 10 milliLiter(s) IV Push every 1 hour PRN Pre/post blood products, medications, blood draw, and to maintain line patency        Vital Signs Last 24 Hrs  T(C): 36.8 (03 Mar 2023 09:03), Max: 37.8 (03 Mar 2023 04:58)  T(F): 98.2 (03 Mar 2023 09:03), Max: 100 (03 Mar 2023 04:58)  HR: 114 (03 Mar 2023 09:02) (102 - 132)  BP: 111/60 (03 Mar 2023 09:02) (101/67 - 130/80)  BP(mean): 76 (03 Mar 2023 09:02) (76 - 100)  RR: 18 (03 Mar 2023 09:02) (18 - 18)  SpO2: 95% (03 Mar 2023 09:02) (95% - 100%)    Parameters below as of 03 Mar 2023 09:02  Patient On (Oxygen Delivery Method): room air        PHYSICAL EXAM:  Constitutional:  chronically ill appearing  Eyes: no icterus   Ear/Nose/Throat: no oral lesion   Neck:  supple  Respiratory: CTA keerthi  Cardiovascular: S1S2 RRR, no murmurs  Gastrointestinal:soft, (+) BS, no HSM  Extremities:no e/e/c  Vascular: DP Pulse:	right normal; left normal      LABS:                        8.1    22.24 )-----------( 540      ( 02 Mar 2023 10:00 )             25.4     03-02    129<L>  |  97  |  12  ----------------------------<  129<H>  4.3   |  24  |  0.38<L>    Ca    8.4      02 Mar 2023 10:00  Phos  2.7     03-02  Mg     1.9     03-02        Urinalysis Basic - ( 02 Mar 2023 03:27 )    Color: Yellow / Appearance: Clear / S.020 / pH: x  Gluc: x / Ketone: NEGATIVE  / Bili: Negative / Urobili: 0.2 E.U./dL   Blood: x / Protein: NEGATIVE mg/dL / Nitrite: NEGATIVE   Leuk Esterase: NEGATIVE / RBC: < 5 /HPF / WBC < 5 /HPF   Sq Epi: x / Non Sq Epi: 0-5 /HPF / Bacteria: Present /HPF        MICROBIOLOGY:    Urinalysis with Rflx Culture (23 @ 00:21)    Urine Appearance: Clear    Color: Yellow    Specific Gravity: 1.015    pH Urine: 7.0    Protein, Urine: NEGATIVE mg/dL    Glucose Qualitative, Urine: NEGATIVE    Ketone - Urine: NEGATIVE    Blood, Urine: Trace    Bilirubin: Negative    Urobilinogen: 0.2 E.U./dL    Leukocyte Esterase Concentration: NEGATIVE    Nitrite: NEGATIVE        RADIOLOGY & ADDITIONAL STUDIES:

## 2023-03-03 NOTE — PROGRESS NOTE ADULT - ASSESSMENT
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  Suspect continued signs of infection are due to undrained abscess    Recommend:  1.  Continue Daptomycin 500 mg IV daily  2.  Continue Ertapenem 1 gram IV daily  3.  if/when goes to OR and intra-abdominal purulence/necrosis encountered, would send specimen for gram stain and culture    ID team 2 will follow. Dr. Jerome will cover the service tonight and this weekend.  I will return on 3/6/2023

## 2023-03-04 NOTE — PROGRESS NOTE ADULT - SUBJECTIVE AND OBJECTIVE BOX
STATUS POST:  Procedure/ Surgery:  12/11: Ex lap, SMA embolectomy, 80cm SBR, abthera vac  12/13: Second look, SBR  12/15: Ex-lap, no dead gut, DANIEL of discontinuous gut, loop ileostomy; EBL minimal, 1L crystalloid, UOP 150mL   12/30: LORENZO & Cardioversion on 12/30.   2/15: L AKA guillotine, EBL 10cc, IVF 400cc,   3/1: L AKA closure.     SUBJECTIVE: Patient seen and examined bedside by chief resident. Patient stable, no acute complaints. No abdominal discomfort, CP, or SOB.     aMIOdarone    Tablet 100 milliGRAM(s) Oral every 24 hours  DAPTOmycin IVPB 500 milliGRAM(s) IV Intermittent every 24 hours  enoxaparin Injectable 65 milliGRAM(s) SubCutaneous every 12 hours  ertapenem  IVPB 1000 milliGRAM(s) IV Intermittent every 24 hours  metoprolol succinate ER 25 milliGRAM(s) Oral daily      Vital Signs Last 24 Hrs  T(C): 37.8 (04 Mar 2023 15:22), Max: 37.9 (03 Mar 2023 22:56)  T(F): 100.1 (04 Mar 2023 15:22), Max: 100.3 (03 Mar 2023 22:56)  HR: 106 (04 Mar 2023 15:22) (100 - 116)  BP: 131/85 (04 Mar 2023 15:22) (107/65 - 131/85)  BP(mean): 104 (04 Mar 2023 15:22) (79 - 104)  RR: 18 (04 Mar 2023 15:22) (17 - 20)  SpO2: 100% (04 Mar 2023 15:22) (100% - 100%)    Parameters below as of 04 Mar 2023 15:22  Patient On (Oxygen Delivery Method): room air      I&O's Detail    03 Mar 2023 07:01  -  04 Mar 2023 07:00  --------------------------------------------------------  IN:    Fat Emulsion (Fish Oil &amp; Plant Based) 20% Infusion: 83.3 mL    Fat Emulsion (Fish Oil &amp; Plant Based) 20% Infusion: 104 mL    TPN (Total Parenteral Nutrition): 1245 mL  Total IN: 1432.3 mL    OUT:    Ileostomy (mL): 1925 mL    Voided (mL): 1050 mL  Total OUT: 2975 mL    Total NET: -1542.7 mL      04 Mar 2023 07:01  -  04 Mar 2023 17:31  --------------------------------------------------------  IN:    IV PiggyBack: 50 mL    IV PiggyBack: 1300 mL    Oral Fluid: 120 mL    PRBCs (Packed Red Blood Cells): 300 mL    TPN (Total Parenteral Nutrition): 830 mL  Total IN: 2600 mL    OUT:    Ileostomy (mL): 1825 mL    Voided (mL): 400 mL  Total OUT: 2225 mL    Total NET: 375 mL          General: NAD, resting comfortably in bed  C/V: NSR  Pulm: Nonlabored breathing, no respiratory distress  Abd: abd pad in place, soft, NT/ND. ostomy p/p/p, incisions c/d/i  Extrem: s/p L AKA, dressings clean        LABS:                        7.0    17.11 )-----------( 534      ( 04 Mar 2023 10:00 )             22.5     03-04    136  |  105  |  11  ----------------------------<  134<H>  3.6   |  21<L>  |  0.45<L>    Ca    8.4      04 Mar 2023 10:00  Phos  2.3     03-04  Mg     2.0     03-04            RADIOLOGY & ADDITIONAL STUDIES:

## 2023-03-04 NOTE — PROVIDER CONTACT NOTE (OTHER) - SITUATION
pt told covering RN around 4am he knows there isn't a bird inside room but he is seeing it; also states he is in severe pain; Covering RN gave 10 Oxy; pt assesses by primary RN at 5am

## 2023-03-04 NOTE — PROGRESS NOTE ADULT - SUBJECTIVE AND OBJECTIVE BOX
Subjective: Patient reports he is feeling well this morning. Pain is currently well controlled. Tolerating diet. -F/C/CP/SOB/N/V    ROS:   Denies Headache, blurred vision, Chest Pain, SOB, Abdominal pain, nausea or vomiting     Social   DAPTOmycin IVPB 500  ertapenem  IVPB 1000  aMIOdarone    Tablet 100  DAPTOmycin IVPB 500  enoxaparin Injectable 65  ertapenem  IVPB 1000  metoprolol succinate ER 25      Allergies    No Known Allergies    Intolerances        Vital Signs Last 24 Hrs  T(C): 37.8 (04 Mar 2023 15:22), Max: 37.9 (03 Mar 2023 22:56)  T(F): 100.1 (04 Mar 2023 15:22), Max: 100.3 (03 Mar 2023 22:56)  HR: 106 (04 Mar 2023 15:22) (100 - 116)  BP: 131/85 (04 Mar 2023 15:22) (107/65 - 131/85)  BP(mean): 104 (04 Mar 2023 15:22) (79 - 104)  RR: 18 (04 Mar 2023 15:22) (17 - 20)  SpO2: 100% (04 Mar 2023 15:22) (95% - 100%)    Parameters below as of 04 Mar 2023 15:22  Patient On (Oxygen Delivery Method): room air      I&O's Summary    03 Mar 2023 07:01  -  04 Mar 2023 07:00  --------------------------------------------------------  IN: 1432.3 mL / OUT: 2975 mL / NET: -1542.7 mL    04 Mar 2023 07:01  -  04 Mar 2023 16:44  --------------------------------------------------------  IN: 2600 mL / OUT: 2225 mL / NET: 375 mL        Physical Exam:  General: NAD, resting comfortably in bed  Pulmonary: Nonlabored breathing, no respiratory distress  Cardiovascular: NSR  Abdominal: soft, NT/ND  Extremities: WWP, normal strength, L AKA site c/d/i with minimal strikethrough.  Neuro: A/O x 3, CNs II-XII grossly intact, no focal deficits    LABS:                        7.0    17.11 )-----------( 534      ( 04 Mar 2023 10:00 )             22.5     03-04    136  |  105  |  11  ----------------------------<  134<H>  3.6   |  21<L>  |  0.45<L>    Ca    8.4      04 Mar 2023 10:00  Phos  2.3     03-04  Mg     2.0     03-04          Radiology and Additional Studies:    78M with PMHx of IVDU found unresponsive at his nursing home, resolved after Narcan in the field and brought to [facility]. Found to have PE, atrial flutter, and large LV thrombus on ECHOand CTA showing mid SMA with embolus s/p Ex lap, SMA embolectomy, 80cm SBR, abthera vac left in discontinuity (12/11) and transferred to SICU intubated. S/p second look (12/13) and most recently s/p OR for 3rd look, end-to-end anastomosis of remaining bowel, loop ileostomy and abdomen closure (12/15). Prev stepped down to telemetry. LLE ischemia, AKA delayed by nutritional optimization. Transferred to SICU in the setting of sepsis for HD monitoring. 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 Pt w/ resolving tachycardia since OR and transient WBC, surgical site clean and intact w/o signs of infection.    - Maintain surgical dressing, can reinforce as needed  - Remainder of care per primary team  - Vascular will continue to follow

## 2023-03-04 NOTE — PROGRESS NOTE ADULT - SUBJECTIVE AND OBJECTIVE BOX
HOSPITALIST COMGMT PROGRESS NOTE    OVERNIGHT EVENTS: NAEO    SUBJECTIVE / INTERVAL HPI: Patient seen and examined at bedside. Patient without complaints. Says pain at AKA site is only mild. Patient denying chest pain, SOB, palpitations, cough. Patient denies fever, chills, HA, Dizziness, change in vision/hearing, N/V, abdominal pain, diarrhea, constipation, hematochezia/melena, dysuria, hematuria, new onset weakness/numbness, LE pain and/or swelling.    Remaining ROS negative       PHYSICAL EXAM:    General: Thin AA male  HEENT: NC/AT; PERRL, anicteric sclera; dry mucous membranes, poor dentition  Neck: supple  Cardiovascular: +S1/S2, RRR  Respiratory: CTA B/L; no W/R/R  Gastrointestinal: soft, NT/ND; +BSx4  Extremities: left AKA site ACE wrapped  Vascular: intact pulses in RLE  Neurological: AAOx3; no focal deficits  Psychiatric: pleasant mood and affect      VITAL SIGNS:  Vital Signs Last 24 Hrs  T(C): 36.7 (03 Mar 2023 13:55), Max: 37.8 (03 Mar 2023 04:58)  T(F): 98 (03 Mar 2023 13:55), Max: 100 (03 Mar 2023 04:58)  HR: 108 (03 Mar 2023 16:45) (108 - 132)  BP: 128/65 (03 Mar 2023 16:45) (111/60 - 136/76)  BP(mean): 93 (03 Mar 2023 16:45) (76 - 101)  RR: 17 (03 Mar 2023 11:58) (17 - 18)  SpO2: 95% (03 Mar 2023 16:45) (95% - 100%)    Parameters below as of 03 Mar 2023 16:45  Patient On (Oxygen Delivery Method): room air          MEDICATIONS:  MEDICATIONS  (STANDING):  aMIOdarone    Tablet 100 milliGRAM(s) Oral every 24 hours  ascorbic acid 500 milliGRAM(s) Oral daily  chlorhexidine 2% Cloths 1 Application(s) Topical daily  cholestyramine Powder (Sugar-Free) 2 Gram(s) Oral three times a day  DAPTOmycin IVPB 500 milliGRAM(s) IV Intermittent every 24 hours  enoxaparin Injectable 65 milliGRAM(s) SubCutaneous every 12 hours  fat emulsion (Fish Oil and Plant Based) 20% Infusion 0.7764 Gm/kG/Day (20.83 mL/Hr) IV Continuous <Continuous>  influenza  Vaccine (HIGH DOSE) 0.7 milliLiter(s) IntraMuscular once  loperamide 4 milliGRAM(s) Oral every 8 hours  metoprolol succinate ER 25 milliGRAM(s) Oral daily  mirtazapine 30 milliGRAM(s) Oral at bedtime  multivitamin 1 Tablet(s) Oral daily  opium Tincture 6 milliGRAM(s) Oral every 12 hours  pantoprazole    Tablet 40 milliGRAM(s) Oral two times a day  Parenteral Nutrition - Adult 1 Each (83 mL/Hr) TPN Continuous <Continuous>  Parenteral Nutrition - Adult 1 Each (83 mL/Hr) TPN Continuous <Continuous>  povidone iodine 10% Solution 1 Application(s) Topical daily  psyllium Powder 1 Packet(s) Oral every 8 hours  sodium chloride 0.9% lock flush 10 milliLiter(s) IV Push every 8 hours  zinc sulfate 220 milliGRAM(s) Oral daily    MEDICATIONS  (PRN):  acetaminophen     Tablet .. 650 milliGRAM(s) Oral every 6 hours PRN Temp greater or equal to 38C (100.4F), Mild Pain (1 - 3)  HYDROmorphone  Injectable 0.5 milliGRAM(s) IV Push every 4 hours PRN breakthrough pain  melatonin 3 milliGRAM(s) Oral at bedtime PRN Insomnia  ondansetron Injectable 4 milliGRAM(s) IV Push every 6 hours PRN Nausea and/or Vomiting  oxyCODONE    IR 10 milliGRAM(s) Oral every 4 hours PRN Severe Pain (7 - 10)  oxyCODONE    IR 5 milliGRAM(s) Oral every 4 hours PRN Moderate Pain (4 - 6)  sodium chloride 0.9% lock flush 10 milliLiter(s) IV Push every 1 hour PRN Pre/post blood products, medications, blood draw, and to maintain line patency      ALLERGIES:  Allergies    No Known Allergies    Intolerances        LABS:                        7.9    23.43 )-----------( 579      ( 03 Mar 2023 10:58 )             25.4     03-03    131<L>  |  97  |  15  ----------------------------<  99  4.1   |  24  |  0.46<L>    Ca    8.8      03 Mar 2023 10:58  Phos  2.2     03-  Mg     2.1     -03        Urinalysis Basic - ( 02 Mar 2023 03:27 )    Color: Yellow / Appearance: Clear / S.020 / pH: x  Gluc: x / Ketone: NEGATIVE  / Bili: Negative / Urobili: 0.2 E.U./dL   Blood: x / Protein: NEGATIVE mg/dL / Nitrite: NEGATIVE   Leuk Esterase: NEGATIVE / RBC: < 5 /HPF / WBC < 5 /HPF   Sq Epi: x / Non Sq Epi: 0-5 /HPF / Bacteria: Present /HPF      CAPILLARY BLOOD GLUCOSE          RADIOLOGY & ADDITIONAL TESTS: Reviewed.

## 2023-03-04 NOTE — PROVIDER CONTACT NOTE (OTHER) - ACTION/TREATMENT ORDERED:
Contacted Ivan from Team 4 who ordered 1 unit of packed red blood cells. Administered 1 unit of packed red blood cells and continued to monitor.

## 2023-03-04 NOTE — PROGRESS NOTE ADULT - ASSESSMENT
77M PMHx of IVDU found unresponsive at his nursing home, resolved after Narcan in the field and brought to [facility]. Found to have PE, atrial flutter, and large LV thrombus on echo. Transferred to [facility] for further management. Pt c/o abdominal pain on 12/10 CTA showing mid SMA with embolus. Abnormal distal small bowel loops and cecum with dilatation and pneumatosis suggesting infarcted bowel. Now s/p Ex lap, SMA embolectomy, 80cm SBR, abthera vac left in discontinuity (12/11). S/p second look (12/13), s/p OR for 3rd look, end-to-end anastomosis of remaining bowel, loop ileostomy and abdomen closure (12/15). S/p LORENZO and Cardioversion on 12/30 with cardiology. Patient was nutritionally optimized. 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 s/p L AKA closure (3/1). Now pending ostomy reversal.     -Regular diet with TPN via picc (2/20)   -Replete high ostomy output q6hr  -Continue Immodium/Tincture of Opium.  -Undrainable abdominal fluid collection   -ID following: Erta( 2/13- ),  (Dapto 2/11- )    -pain/nausea control prn   -Continue metoprolol/amiodarone   -Holding spironolactone, entresto   -SCDs, SQL 65BID.   -Dispo: ROWAN

## 2023-03-04 NOTE — PROGRESS NOTE ADULT - SUBJECTIVE AND OBJECTIVE BOX
INFECTIOUS DISEASES CONSULT FOLLOW-UP NOTE    INTERVAL HPI/OVERNIGHT EVENTS:  Had 100.4 fever overnight  patient feels fine, denied CP, SOB, n/v, abdominal pain     ROS:   Constitutional, eyes, ENT, cardiovascular, respiratory, gastrointestinal, genitourinary, integumentary, neurological, psychiatric and heme/lymph are otherwise negative other than noted above       ANTIBIOTICS/RELEVANT:    MEDICATIONS  (STANDING):  aMIOdarone    Tablet 100 milliGRAM(s) Oral every 24 hours  ascorbic acid 500 milliGRAM(s) Oral daily  chlorhexidine 2% Cloths 1 Application(s) Topical daily  cholestyramine Powder (Sugar-Free) 2 Gram(s) Oral three times a day  DAPTOmycin IVPB 500 milliGRAM(s) IV Intermittent every 24 hours  enoxaparin Injectable 65 milliGRAM(s) SubCutaneous every 12 hours  ertapenem  IVPB 1000 milliGRAM(s) IV Intermittent every 24 hours  fat emulsion (Fish Oil and Plant Based) 20% Infusion 0.7764 Gm/kG/Day (20.8 mL/Hr) IV Continuous <Continuous>  influenza  Vaccine (HIGH DOSE) 0.7 milliLiter(s) IntraMuscular once  lactated ringers Bolus 500 milliLiter(s) IV Bolus once  loperamide 4 milliGRAM(s) Oral every 8 hours  metoprolol succinate ER 25 milliGRAM(s) Oral daily  mirtazapine 30 milliGRAM(s) Oral at bedtime  multivitamin 1 Tablet(s) Oral daily  opium Tincture 6 milliGRAM(s) Oral every 12 hours  pantoprazole    Tablet 40 milliGRAM(s) Oral two times a day  Parenteral Nutrition - Adult 1 Each (83 mL/Hr) TPN Continuous <Continuous>  Parenteral Nutrition - Adult 1 Each (83 mL/Hr) TPN Continuous <Continuous>  povidone iodine 10% Solution 1 Application(s) Topical daily  psyllium Powder 1 Packet(s) Oral every 8 hours  sodium chloride 0.9% lock flush 10 milliLiter(s) IV Push every 8 hours  zinc sulfate 220 milliGRAM(s) Oral daily    MEDICATIONS  (PRN):  acetaminophen     Tablet .. 650 milliGRAM(s) Oral every 6 hours PRN Temp greater or equal to 38C (100.4F), Mild Pain (1 - 3)  HYDROmorphone  Injectable 0.5 milliGRAM(s) IV Push every 4 hours PRN breakthrough pain  melatonin 3 milliGRAM(s) Oral at bedtime PRN Insomnia  ondansetron Injectable 4 milliGRAM(s) IV Push every 6 hours PRN Nausea and/or Vomiting  oxyCODONE    IR 10 milliGRAM(s) Oral every 4 hours PRN Severe Pain (7 - 10)  oxyCODONE    IR 5 milliGRAM(s) Oral every 4 hours PRN Moderate Pain (4 - 6)  sodium chloride 0.9% lock flush 10 milliLiter(s) IV Push every 1 hour PRN Pre/post blood products, medications, blood draw, and to maintain line patency        Vital Signs Last 24 Hrs  T(C): 36.1 (04 Mar 2023 20:00), Max: 37.9 (03 Mar 2023 22:56)  T(F): 97 (04 Mar 2023 20:00), Max: 100.3 (03 Mar 2023 22:56)  HR: 106 (04 Mar 2023 15:22) (100 - 116)  BP: 131/85 (04 Mar 2023 15:22) (107/65 - 131/85)  BP(mean): 104 (04 Mar 2023 15:22) (79 - 104)  RR: 18 (04 Mar 2023 15:22) (17 - 20)  SpO2: 100% (04 Mar 2023 15:22) (100% - 100%)    Parameters below as of 04 Mar 2023 15:22  Patient On (Oxygen Delivery Method): room air        03-03-23 @ 07:01  -  03-04-23 @ 07:00  --------------------------------------------------------  IN: 1432.3 mL / OUT: 2975 mL / NET: -1542.7 mL    03-04-23 @ 07:01  -  03-04-23 @ 20:19  --------------------------------------------------------  IN: 2600 mL / OUT: 2225 mL / NET: 375 mL      PHYSICAL EXAM:  Constitutional: alert, NAD  Eyes: the sclera and conjunctiva were normal.   ENT: the ears and nose were normal in appearance.   Neck: the appearance of the neck was normal and the neck was supple.   Pulmonary: no respiratory distress and lungs were clear to auscultation bilaterally.   Heart: heart rate was normal and rhythm regular, normal S1 and S2  Vascular:. there was no peripheral edema  Abdomen: normal bowel sounds, soft, non-tender, ostomy bag   Ext: L AKA      LABS:                        7.0    17.11 )-----------( 534      ( 04 Mar 2023 10:00 )             22.5     03-04    136  |  105  |  11  ----------------------------<  134<H>  3.6   |  21<L>  |  0.45<L>    Ca    8.4      04 Mar 2023 10:00  Phos  2.3     03-04  Mg     2.0     03-04            MICROBIOLOGY:      RADIOLOGY & ADDITIONAL STUDIES:  Reviewed

## 2023-03-04 NOTE — PROGRESS NOTE ADULT - ASSESSMENT
78M initially admitted for mesenteric ischemia due to SMA embolus s/p ex-lap with small bowel resection. Course c/b LLE ischemia s/p AKA on 3/1, FTT, GOLDIE, intraabdominal abscess due to MDRO. Intermittently febrile post-op likely due to undrained abscess due to post-op fever.      - cont daptomycin 500mg IV q24h  - cont ertapenem 1g IV q24h  - if/when goes to OR and intraabdominal abscess seen please send culture      Team 2 will follow you.  Dr Guzman will resume care on Monday.  Case d/w primary team.    Shraddha Jerome MD, MS  Infectious Disease attending  work cell [phone]   For any questions during evening/weekend/holiday, please page ID on call

## 2023-03-04 NOTE — PROVIDER CONTACT NOTE (OTHER) - ASSESSMENT
when assessing pt; pt states he saw his mom "prema martinez" pt temp is 100.2- probe reinserted; pt got 0.5 Iv push dilaudid for pain; pt only accepted partial AM care; no dressings changed as per pt does;t want to be bothered when assessing pt; pt states he saw his mom "prema martinez" pt temp is 100.2- probe reinserted; pt got 0.5 Iv push Dilaudid for pain; pt only accepted partial AM care; no dressings changed as per pt doesn't want to be bothered. pt has refused tylenol 23:00 3/3 MD aware

## 2023-03-04 NOTE — PROGRESS NOTE ADULT - ASSESSMENT
77M first presented to [facility] from [facility] due to unresponsiveness (responded to Narcan). At [facility], found to have subsegmental pulmonary embolism in RUL, rapid atrial flutter with severely reduced LVEF with LV thrombus. Transferred to [facility] on 12/7/22 for LORENZO and possible ablation. At [facility], was found to have left leg ischemia (left leg arterial thrombus on LE duplex on 12/8). Was being considered for rhythm control when he developed abdominal pain, CTA (12/10/22) showed embolus in SMA, bowel infarct, requiring ex-lap on 12/11 with SMA embolectomy, 80cm small bowel resection; On 12/13, underwent 2nd look laparotomy, with 80cm small bowel resection, closure with abthera. On 12/15, underwent 3rd look laparotomy, end-to-end anastomosis of remaining bowel, loop ileostomy and abdominal closure. Eventually underwent LORENZO/DCCV on 12/30/22, now in sinus rhythm. More recently found to have k. 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     #bacteremia - k pneumoniae, VRE facium suspected from undrained abdominal abscess  - recent blood cultures without any growth, and will continue with daptomycin and ertapenem  - continue to monitor for fever - fever curve downtrending with leukocytosis also downtrending  - return to OR next week for ostomy reversal    #s/p left AKA  - s/p OR with vascular for wound closure  - diuretic and ARB still on hold.  Still continues to have fevers     #anemia of chronic disease  - hg 7.0 today  -no e/o active bleeding  -asymptomatic  -transfusion goal > 7 - likely can hold off on transfusion and recheck in AM, monitor for bleeding    #Suicidal Ideation  #Insomnia   - Continue mirtazapine 30mg QHS   - psychiatry following, and appreciate any input    # HFrEF, not in exacerbation   - TTE from 2/11 reviewed, normal LVEF   - Metoprolol succinate 25mg qd - continue  - not volume overloaded.  continuing to hold entresto and spironolactone.      # Atrial Flutter  - s/p DCCV 12/30  - EP recs - uninterrupted AC x 30 days ; January 29th was 30 days post DCCV.   -Continue full dose Lovenox, amiodarone and metoprolol succinate    #hyponatremia  -nl Na today     -# Pulmonary embolism (subsegmental RUL)   - After completion of AC for DCCV, would need to continue AC for PE - currently continuing lovenox    # Bowel ischemia - s/p SMA embolectomy; Small bowel resection  - ileostomy in place, returning to OR for reversal next week  - plan of primary team  - continue loperamide and diphenoxylate atropine    # Severe protein-calorie malnutrition  # Sacral wound   - appreciate input from nutrition  - Wound care per nursing      #DVT ppx - continue lovenox for PE

## 2023-03-05 NOTE — PROGRESS NOTE ADULT - ASSESSMENT
77M PMHx of IVDU found unresponsive at his nursing home, resolved after Narcan in the field and brought to [facility]. Found to have PE, atrial flutter, and large LV thrombus on echo. Transferred to [facility] for further management. Pt c/o abdominal pain on 12/10 CTA showing mid SMA with embolus. Abnormal distal small bowel loops and cecum with dilatation and pneumatosis suggesting infarcted bowel. Now s/p Ex lap, SMA embolectomy, 80cm SBR, abthera vac left in discontinuity (12/11). S/p second look (12/13), s/p OR for 3rd look, end-to-end anastomosis of remaining bowel, loop ileostomy and abdomen closure (12/15). S/p LORENZO and Cardioversion on 12/30 with cardiology. Patient was nutritionally optimized. 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 s/p L AKA closure (3/1). Now pending ostomy reversal.     -Possible OR week of 3/6 for ostomy reversal   -Regular diet with TPN via picc (2/20)   -Replete high ostomy output q6hr  -Continue Immodium/Tincture of Opium.  -Undrainable abdominal fluid collection   -ID following: Erta( 2/13- ),  (Dapto 2/11- )    -pain/nausea control prn   -Continue metoprolol/amiodarone   -Holding spironolactone, entresto   -SCDs, SQL 65BID.   -Dispo: ROWAN

## 2023-03-05 NOTE — PROGRESS NOTE ADULT - ASSESSMENT
78M with PMHx of IVDU found unresponsive at his nursing home, resolved after Narcan in the field and brought to [facility]. Found to have PE, atrial flutter, and large LV thrombus on ECHOand CTA showing mid SMA with embolus s/p Ex lap, SMA embolectomy, 80cm SBR, abthera vac left in discontinuity (12/11) and transferred to SICU intubated. S/p second look (12/13) and most recently s/p OR for 3rd look, end-to-end anastomosis of remaining bowel, loop ileostomy and abdomen closure (12/15). Prev stepped down to telemetry. LLE ischemia, AKA delayed by nutritional optimization. Transferred to SICU in the setting of sepsis for HD monitoring. 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 Pt w/ resolving tachycardia since OR and transient WBC, surgical site clean and intact w/o signs of infection.    - Maintain surgical dressing, can reinforce as needed  - Remainder of care per primary team  - Vascular will continue to follow

## 2023-03-05 NOTE — PROGRESS NOTE ADULT - SUBJECTIVE AND OBJECTIVE BOX
Subjective: No pain at LLE stump. No new complaints. Reports appetite. Low mood.    ROS:   Denies Headache, blurred vision, Chest Pain, SOB, Abdominal pain, nausea or vomiting     Social   DAPTOmycin IVPB 500  ertapenem  IVPB 1000  aMIOdarone    Tablet 100  DAPTOmycin IVPB 500  enoxaparin Injectable 65  ertapenem  IVPB 1000  metoprolol succinate ER 25      Allergies    No Known Allergies    Intolerances        Vital Signs Last 24 Hrs  T(C): 38.1 (05 Mar 2023 12:51), Max: 38.3 (05 Mar 2023 06:13)  T(F): 100.5 (05 Mar 2023 12:51), Max: 101 (05 Mar 2023 06:13)  HR: 100 (05 Mar 2023 12:51) (86 - 106)  BP: 127/70 (05 Mar 2023 12:51) (104/58 - 131/85)  BP(mean): 90 (05 Mar 2023 12:51) (74 - 104)  RR: 18 (05 Mar 2023 12:51) (17 - 20)  SpO2: 100% (05 Mar 2023 12:51) (100% - 100%)    Parameters below as of 05 Mar 2023 12:51  Patient On (Oxygen Delivery Method): room air      I&O's Summary    04 Mar 2023 07:01  -  05 Mar 2023 07:00  --------------------------------------------------------  IN: 5140.8 mL / OUT: 3875 mL / NET: 1265.8 mL    05 Mar 2023 07:01  -  05 Mar 2023 14:06  --------------------------------------------------------  IN: 1172.6 mL / OUT: 1475 mL / NET: -302.4 mL        Physical Exam:  General: NAD, resting comfortably in bed  Pulmonary: Nonlabored breathing, no respiratory distress  Cardiovascular: NSR  Abdominal: soft, NT/ND  Extremities: WWP, normal strength, L AKA staple line c/d/i, no discharge. Dressing changed.  Neuro: A/O x 3, CNs II-XII grossly intact, no focal deficits      LABS:                        7.9    12.60 )-----------( 581      ( 05 Mar 2023 06:11 )             24.8     03-05    137  |  106  |  11  ----------------------------<  126<H>  3.7   |  20<L>  |  0.42<L>    Ca    8.1<L>      05 Mar 2023 06:11  Phos  2.7     03-05  Mg     2.0     03-05

## 2023-03-05 NOTE — PROGRESS NOTE ADULT - SUBJECTIVE AND OBJECTIVE BOX
STATUS POST:  12/11: Ex lap, SMA embolectomy, 80cm SBR, abthera vac  12/13: Second look, SBR  12/15: Ex-lap, no dead gut, DANIEL of discontinuous gut, loop ileostomy; EBL minimal, 1L crystalloid, UOP 150mL   12/30: LORENZO & Cardioversion on 12/30.   2/15: L AKA guillotine, EBL 10cc, IVF 400cc,   3/1: L AKA closure.     SUBJECTIVE: Pt seen and examined at bedside this am by surgery team. Received 1U PRBC yesterday with appropriately Hgb response this AM.   Complains of RLE pain. Tmax 101F overnight. Tylenol given, on cooling blanket, several fever work-ups past week have all been negative.   Tolerating diet without nausea or vomiting. Ostomy functioning.    Denies f/n/v/cp/sob.    Vital Signs Last 24 Hrs  T(C): 37.7 (05 Mar 2023 08:00), Max: 38.3 (05 Mar 2023 06:13)  T(F): 99.8 (05 Mar 2023 08:00), Max: 101 (05 Mar 2023 06:13)  HR: 102 (05 Mar 2023 04:00) (100 - 116)  BP: 130/71 (05 Mar 2023 04:00) (104/58 - 131/85)  BP(mean): 95 (05 Mar 2023 04:00) (74 - 104)  RR: 17 (05 Mar 2023 04:00) (17 - 20)  SpO2: 100% (05 Mar 2023 04:00) (100% - 100%)    Parameters below as of 05 Mar 2023 04:00  Patient On (Oxygen Delivery Method): room air        PHYSICAL EXAM:   Gen: Awake, alert, NAD, resting comfortably   CV: RRR  Pulm: no respiratory distress on RA  Abd: soft, ND, NTTP, no rebound or guarding, ostomy functioning PPP, incisions c/d/i   Ext: s/p L AKA, dressing clean     I&O's Detail    04 Mar 2023 07:01  -  05 Mar 2023 07:00  --------------------------------------------------------  IN:    Fat Emulsion (Fish Oil &amp; Plant Based) 20% Infusion: 228.8 mL    IV PiggyBack: 50 mL    IV PiggyBack: 1350 mL    Lactated Ringers Bolus: 1000 mL    Oral Fluid: 220 mL    PRBCs (Packed Red Blood Cells): 300 mL    TPN (Total Parenteral Nutrition): 1992 mL  Total IN: 5140.8 mL    OUT:    Ileostomy (mL): 2725 mL    Voided (mL): 1150 mL  Total OUT: 3875 mL    Total NET: 1265.8 mL          LABS:                        7.9    12.60 )-----------( 581      ( 05 Mar 2023 06:11 )             24.8     03-05    137  |  106  |  11  ----------------------------<  126<H>  3.7   |  20<L>  |  0.42<L>    Ca    8.1<L>      05 Mar 2023 06:11  Phos  2.7     03-05  Mg     2.0     03-05          CAPILLARY BLOOD GLUCOSE          RADIOLOGY & ADDITIONAL STUDIES:

## 2023-03-05 NOTE — PROVIDER CONTACT NOTE (OTHER) - ACTION/TREATMENT ORDERED:
Contacted Kellie STEVENSON who stated to turn on cooling blanket until temperature is under 100. Turned on cooling blanket, gave 650mg tylenol, and decreased temperature in room. Temperature @ 15:00 99.5F.

## 2023-03-05 NOTE — PROGRESS NOTE ADULT - SUBJECTIVE AND OBJECTIVE BOX
HOSPITALIST COMGMT PROGRESS NOTE    OVERNIGHT EVENTS: NAEO    SUBJECTIVE / INTERVAL HPI: 1 unit PRBC given for Hg 7. Patient seen and examined at bedside. Patient without complaints. Says pain at AKA site is only mild. Patient denying chest pain, SOB, palpitations, cough. Patient denies fever, chills, HA, Dizziness, change in vision/hearing, N/V, abdominal pain, diarrhea, constipation, hematochezia/melena, dysuria, hematuria, new onset weakness/numbness, LE pain and/or swelling.    Remaining ROS negative       PHYSICAL EXAM:    General: Thin AA male  HEENT: NC/AT; PERRL, anicteric sclera; dry mucous membranes, poor dentition  Neck: supple  Cardiovascular: +S1/S2, RRR  Respiratory: CTA B/L; no W/R/R  Gastrointestinal: soft, NT/ND; +BSx4  Extremities: left AKA site ACE wrapped  Vascular: intact pulses in RLE  Neurological: AAOx3; no focal deficits  Psychiatric: pleasant mood and affect      VITAL SIGNS:  Vital Signs Last 24 Hrs  T(C): 36.7 (03 Mar 2023 13:55), Max: 37.8 (03 Mar 2023 04:58)  T(F): 98 (03 Mar 2023 13:55), Max: 100 (03 Mar 2023 04:58)  HR: 108 (03 Mar 2023 16:45) (108 - 132)  BP: 128/65 (03 Mar 2023 16:45) (111/60 - 136/76)  BP(mean): 93 (03 Mar 2023 16:45) (76 - 101)  RR: 17 (03 Mar 2023 11:58) (17 - 18)  SpO2: 95% (03 Mar 2023 16:45) (95% - 100%)    Parameters below as of 03 Mar 2023 16:45  Patient On (Oxygen Delivery Method): room air          MEDICATIONS:  MEDICATIONS  (STANDING):  aMIOdarone    Tablet 100 milliGRAM(s) Oral every 24 hours  ascorbic acid 500 milliGRAM(s) Oral daily  chlorhexidine 2% Cloths 1 Application(s) Topical daily  cholestyramine Powder (Sugar-Free) 2 Gram(s) Oral three times a day  DAPTOmycin IVPB 500 milliGRAM(s) IV Intermittent every 24 hours  enoxaparin Injectable 65 milliGRAM(s) SubCutaneous every 12 hours  fat emulsion (Fish Oil and Plant Based) 20% Infusion 0.7764 Gm/kG/Day (20.83 mL/Hr) IV Continuous <Continuous>  influenza  Vaccine (HIGH DOSE) 0.7 milliLiter(s) IntraMuscular once  loperamide 4 milliGRAM(s) Oral every 8 hours  metoprolol succinate ER 25 milliGRAM(s) Oral daily  mirtazapine 30 milliGRAM(s) Oral at bedtime  multivitamin 1 Tablet(s) Oral daily  opium Tincture 6 milliGRAM(s) Oral every 12 hours  pantoprazole    Tablet 40 milliGRAM(s) Oral two times a day  Parenteral Nutrition - Adult 1 Each (83 mL/Hr) TPN Continuous <Continuous>  Parenteral Nutrition - Adult 1 Each (83 mL/Hr) TPN Continuous <Continuous>  povidone iodine 10% Solution 1 Application(s) Topical daily  psyllium Powder 1 Packet(s) Oral every 8 hours  sodium chloride 0.9% lock flush 10 milliLiter(s) IV Push every 8 hours  zinc sulfate 220 milliGRAM(s) Oral daily    MEDICATIONS  (PRN):  acetaminophen     Tablet .. 650 milliGRAM(s) Oral every 6 hours PRN Temp greater or equal to 38C (100.4F), Mild Pain (1 - 3)  HYDROmorphone  Injectable 0.5 milliGRAM(s) IV Push every 4 hours PRN breakthrough pain  melatonin 3 milliGRAM(s) Oral at bedtime PRN Insomnia  ondansetron Injectable 4 milliGRAM(s) IV Push every 6 hours PRN Nausea and/or Vomiting  oxyCODONE    IR 10 milliGRAM(s) Oral every 4 hours PRN Severe Pain (7 - 10)  oxyCODONE    IR 5 milliGRAM(s) Oral every 4 hours PRN Moderate Pain (4 - 6)  sodium chloride 0.9% lock flush 10 milliLiter(s) IV Push every 1 hour PRN Pre/post blood products, medications, blood draw, and to maintain line patency      ALLERGIES:  Allergies    No Known Allergies    Intolerances        LABS:                        7.9    23.43 )-----------( 579      ( 03 Mar 2023 10:58 )             25.4     03-03    131<L>  |  97  |  15  ----------------------------<  99  4.1   |  24  |  0.46<L>    Ca    8.8      03 Mar 2023 10:58  Phos  2.2     -  Mg     2.1     -        Urinalysis Basic - ( 02 Mar 2023 03:27 )    Color: Yellow / Appearance: Clear / S.020 / pH: x  Gluc: x / Ketone: NEGATIVE  / Bili: Negative / Urobili: 0.2 E.U./dL   Blood: x / Protein: NEGATIVE mg/dL / Nitrite: NEGATIVE   Leuk Esterase: NEGATIVE / RBC: < 5 /HPF / WBC < 5 /HPF   Sq Epi: x / Non Sq Epi: 0-5 /HPF / Bacteria: Present /HPF      CAPILLARY BLOOD GLUCOSE          RADIOLOGY & ADDITIONAL TESTS: Reviewed.

## 2023-03-05 NOTE — PROGRESS NOTE ADULT - ASSESSMENT
77M first presented to [facility] from [facility] due to unresponsiveness (responded to Narcan). At [facility], found to have subsegmental pulmonary embolism in RUL, rapid atrial flutter with severely reduced LVEF with LV thrombus. Transferred to [facility] on 12/7/22 for LORENZO and possible ablation. At [facility], was found to have left leg ischemia (left leg arterial thrombus on LE duplex on 12/8). Was being considered for rhythm control when he developed abdominal pain, CTA (12/10/22) showed embolus in SMA, bowel infarct, requiring ex-lap on 12/11 with SMA embolectomy, 80cm small bowel resection; On 12/13, underwent 2nd look laparotomy, with 80cm small bowel resection, closure with abthera. On 12/15, underwent 3rd look laparotomy, end-to-end anastomosis of remaining bowel, loop ileostomy and abdominal closure. Eventually underwent LORENZO/DCCV on 12/30/22, now in sinus rhythm. More recently found to have k. 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     #bacteremia - k pneumoniae, VRE facium suspected from undrained abdominal abscess  - recent blood cultures without any growth, and will continue with daptomycin and ertapenem  - continue to monitor for fever - fevers persist but leukocytosis resolving which is reassuring; appreciate ongoing ID input  - return to OR next week for ostomy reversal    #s/p left AKA  - s/p OR with vascular for wound closure  - diuretic and ARB still on hold.  Still continues to have fevers     #anemia of chronic disease  - hg 7.9 today after 1 prbc - appropriate response  -no e/o active bleeding  -asymptomatic  -transfusion goal > 7     #Suicidal Ideation  #Insomnia   - Continue mirtazapine 30mg QHS   - psychiatry following, and appreciate any input    # HFrEF, not in exacerbation   - TTE from 2/11 reviewed, normal LVEF   - Metoprolol succinate 25mg qd - continue  - not volume overloaded.  continuing to hold entresto and spironolactone.      # Atrial Flutter  - s/p DCCV 12/30  - EP recs - uninterrupted AC x 30 days ; January 29th was 30 days post DCCV.   -Continue full dose Lovenox, amiodarone and metoprolol succinate    #hyponatremia  -nl Na today     -# Pulmonary embolism (subsegmental RUL)   - After completion of AC for DCCV, would need to continue AC for PE - currently continuing lovenox    # Bowel ischemia - s/p SMA embolectomy; Small bowel resection  - ileostomy in place, returning to OR for reversal next week  - plan of primary team  - continue loperamide and diphenoxylate atropine    # Severe protein-calorie malnutrition  # Sacral wound   - appreciate input from nutrition  - Wound care per nursing      #DVT ppx - continue lovenox for PE

## 2023-03-06 NOTE — PROGRESS NOTE ADULT - SUBJECTIVE AND OBJECTIVE BOX
Subjective: Patient seen and examined at bedside. Patient states AKA incision is painful when moved but not any worse than previous dressing changes.    ROS:   Denies Headache, blurred vision, Chest Pain, SOB, Abdominal pain, nausea or vomiting     Social   DAPTOmycin IVPB 500  ertapenem  IVPB 1000  aMIOdarone    Tablet 100  DAPTOmycin IVPB 500  ertapenem  IVPB 1000  metoprolol succinate ER 25      Allergies    No Known Allergies    Intolerances        Vital Signs Last 24 Hrs  T(C): 38 (06 Mar 2023 11:00), Max: 38.9 (05 Mar 2023 21:24)  T(F): 100.4 (06 Mar 2023 11:00), Max: 102 (05 Mar 2023 21:24)  HR: 102 (06 Mar 2023 11:00) (84 - 112)  BP: 123/91 (06 Mar 2023 08:33) (109/62 - 154/77)  BP(mean): 104 (06 Mar 2023 08:33) (80 - 106)  RR: 16 (06 Mar 2023 08:33) (16 - 18)  SpO2: 100% (06 Mar 2023 09:15) (100% - 100%)    Parameters below as of 06 Mar 2023 08:33  Patient On (Oxygen Delivery Method): room air      I&O's Summary    05 Mar 2023 07:01  -  06 Mar 2023 07:00  --------------------------------------------------------  IN: 5941.6 mL / OUT: 4600 mL / NET: 1341.6 mL    06 Mar 2023 07:01  -  06 Mar 2023 11:15  --------------------------------------------------------  IN: 41.6 mL / OUT: 420 mL / NET: -378.4 mL        Physical Exam:  General: NAD, resting comfortably in bed  Pulmonary: Nonlabored breathing, no respiratory distress  Cardiovascular: NSR  Abdominal: soft, NT/ND  Extremities: WWP, normal strength, L AKA staple line c/d/i, no discharge. Dressing changed.  Neuro: A/O x 3, CNs II-XII grossly intact, no focal deficits      LABS:                        8.4    13.74 )-----------( 675      ( 06 Mar 2023 05:30 )             27.0     03-06    138  |  103  |  9   ----------------------------<  118<H>  3.4<L>   |  24  |  0.43<L>    Ca    8.4      06 Mar 2023 05:30  Phos  3.1     03-06  Mg     2.0     03-06    TPro  6.6  /  Alb  2.1<L>  /  TBili  0.4  /  DBili  0.2  /  AST  22  /  ALT  36  /  AlkPhos  239<H>  03-06    78M with PMHx of IVDU found unresponsive at his nursing home, resolved after Narcan in the field and brought to [facility]. Found to have PE, atrial flutter, and large LV thrombus on ECHOand CTA showing mid SMA with embolus s/p Ex lap, SMA embolectomy, 80cm SBR, abthera vac left in discontinuity (12/11) and transferred to SICU intubated. S/p second look (12/13) and most recently s/p OR for 3rd look, end-to-end anastomosis of remaining bowel, loop ileostomy and abdomen closure (12/15). Prev stepped down to telemetry. LLE ischemia, AKA delayed by nutritional optimization. Transferred to SICU in the setting of sepsis for HD monitoring. 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 Pt w/ resolving tachycardia since OR and transient WBC, surgical site clean and intact w/o signs of infection.    - Local wound care- xeroform, wrap with kerlix and secure with ace wrap daily  - Called today to asses incision due to temp of 101- incision without drainage or erythema or fluctuance most likely would not be source of infection  - Remainder of care per primary team  - Vascular will continue to follow call [phone] with any questions or concerns

## 2023-03-06 NOTE — PROGRESS NOTE ADULT - ASSESSMENT
IMPRESSION:  79 yo male with prolonged course c/b mesenteric ischemia s/p SBR and development of RLQ abscess, LLE ischemia s/p L AKA 2/15; VRE faecium and ESBL Klebsiella BSI (enteric napoleon) likely 2/2 undrained intra-abdominal abscess.  Suspect continued signs of infection are due to undrained abscess    Recommend:  1.  Continue Daptomycin 500 mg IV daily  2.  Continue Ertapenem 1 gram IV daily  3.   Can check repeat COVID-PCR to r/o hospital acquired infection  4.  Check repeat CXR  5.  Check repeat blood cultures     ID team 2 will follow

## 2023-03-06 NOTE — PROGRESS NOTE ADULT - SUBJECTIVE AND OBJECTIVE BOX
Patient is a 78y old  Male who presents with a chief complaint of A flutter (06 Mar 2023 11:14)      INTERVAL HPI/OVERNIGHT EVENTS: noted to have temp mx 102     MEDICATIONS  (STANDING):  aMIOdarone    Tablet 100 milliGRAM(s) Oral every 24 hours  ascorbic acid 500 milliGRAM(s) Oral daily  chlorhexidine 2% Cloths 1 Application(s) Topical daily  cholestyramine Powder (Sugar-Free) 2 Gram(s) Oral three times a day  DAPTOmycin IVPB 500 milliGRAM(s) IV Intermittent every 24 hours  ertapenem  IVPB 1000 milliGRAM(s) IV Intermittent every 24 hours  fat emulsion (Fish Oil and Plant Based) 20% Infusion 0.7764 Gm/kG/Day (20.83 mL/Hr) IV Continuous <Continuous>  influenza  Vaccine (HIGH DOSE) 0.7 milliLiter(s) IntraMuscular once  loperamide 4 milliGRAM(s) Oral every 8 hours  metoprolol succinate ER 25 milliGRAM(s) Oral daily  mirtazapine 30 milliGRAM(s) Oral at bedtime  multivitamin 1 Tablet(s) Oral daily  opium Tincture 6 milliGRAM(s) Oral every 12 hours  pantoprazole    Tablet 40 milliGRAM(s) Oral two times a day  Parenteral Nutrition - Adult 1 Each (83 mL/Hr) TPN Continuous <Continuous>  Parenteral Nutrition - Adult 1 Each (83 mL/Hr) TPN Continuous <Continuous>  povidone iodine 10% Solution 1 Application(s) Topical daily  psyllium Powder 1 Packet(s) Oral every 8 hours  sodium chloride 0.9% lock flush 10 milliLiter(s) IV Push every 8 hours  zinc sulfate 220 milliGRAM(s) Oral daily    MEDICATIONS  (PRN):  acetaminophen     Tablet .. 650 milliGRAM(s) Oral every 6 hours PRN Temp greater or equal to 38C (100.4F), Mild Pain (1 - 3)  HYDROmorphone  Injectable 0.5 milliGRAM(s) IV Push every 4 hours PRN breakthrough pain  melatonin 3 milliGRAM(s) Oral at bedtime PRN Insomnia  ondansetron Injectable 4 milliGRAM(s) IV Push every 6 hours PRN Nausea and/or Vomiting  oxyCODONE    IR 10 milliGRAM(s) Oral every 4 hours PRN Severe Pain (7 - 10)  oxyCODONE    IR 5 milliGRAM(s) Oral every 4 hours PRN Moderate Pain (4 - 6)  sodium chloride 0.9% lock flush 10 milliLiter(s) IV Push every 1 hour PRN Pre/post blood products, medications, blood draw, and to maintain line patency      __________________________________________________  REVIEW OF SYSTEMS:    CONSTITUTIONAL: +fever   EYES: no acute visual disturbances  NECK: No pain or stiffness  RESPIRATORY: No cough; No shortness of breath, + clear phlegm   CARDIOVASCULAR: No chest pain, no palpitations  GASTROINTESTINAL: + pain   NEUROLOGICAL: No headache or numbness, no tremors  MUSCULOSKELETAL: +R knee pain, + back pain  GENITOURINARY: no dysuria, no frequency, no hesitancy  PSYCHIATRY: no depression , no anxiety  ALL OTHER  ROS negative        Vital Signs Last 24 Hrs  T(C): 38 (06 Mar 2023 11:00), Max: 38.9 (05 Mar 2023 21:24)  T(F): 100.4 (06 Mar 2023 11:00), Max: 102 (05 Mar 2023 21:24)  HR: 98 (06 Mar 2023 11:40) (84 - 112)  BP: 136/70 (06 Mar 2023 11:40) (109/62 - 154/77)  BP(mean): 97 (06 Mar 2023 11:40) (80 - 106)  RR: 18 (06 Mar 2023 11:40) (16 - 18)  SpO2: 100% (06 Mar 2023 11:40) (100% - 100%)    Parameters below as of 06 Mar 2023 11:40  Patient On (Oxygen Delivery Method): room air        ________________________________________________  PHYSICAL EXAM:  GENERAL: NAD  HEENT: Normocephalic;  conjunctivae and sclerae clear; moist mucous membranes;   NECK : supple  CHEST/LUNG: Clear to auscultation bilaterally with good air entry   HEART: S1 S2  regular; no murmurs, gallops or rubs  ABDOMEN: Soft, Nontender, Nondistended; Bowel sounds present  EXTREMITIES: no cyanosis; no edema; no calf tenderness, L AKA   SKIN: un stageable sacral ulcer, RLE with big toe healing ulceration   NERVOUS SYSTEM:  Awake and alert; Oriented  to place, person and time ; no new deficits    _________________________________________________  LABS:                        8.4    13.74 )-----------( 675      ( 06 Mar 2023 05:30 )             27.0     03-06    138  |  103  |  9   ----------------------------<  118<H>  3.4<L>   |  24  |  0.43<L>    Ca    8.4      06 Mar 2023 05:30  Phos  3.1     03-06  Mg     2.0     03-06    TPro  6.6  /  Alb  2.1<L>  /  TBili  0.4  /  DBili  0.2  /  AST  22  /  ALT  36  /  AlkPhos  239<H>  03-06        CAPILLARY BLOOD GLUCOSE            RADIOLOGY & ADDITIONAL TESTS:      Plan of care was discussed with patient and /or primary care giver; all questions and concerns were addressed and care was aligned with patient's wishes.

## 2023-03-06 NOTE — PROGRESS NOTE ADULT - ASSESSMENT
77M first presented to [facility] from [facility] due to unresponsiveness (responded to Narcan). At [facility], found to have subsegmental pulmonary embolism in RUL, rapid atrial flutter with severely reduced LVEF with LV thrombus. Transferred to [facility] on 12/7/22 for LORENZO and possible ablation. At [facility], was found to have left leg ischemia (left leg arterial thrombus on LE duplex on 12/8). Was being considered for rhythm control when he developed abdominal pain, CTA (12/10/22) showed embolus in SMA, bowel infarct, requiring ex-lap on 12/11 with SMA embolectomy, 80cm small bowel resection; On 12/13, underwent 2nd look laparotomy, with 80cm small bowel resection, closure with abthera. On 12/15, underwent 3rd look laparotomy, end-to-end anastomosis of remaining bowel, loop ileostomy and abdominal closure. Eventually underwent LORENZO/DCCV on 12/30/22, now in sinus rhythm. More recently found to have k. 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     #bacteremia - k pneumoniae, VRE facium suspected from un-drained abdominal abscess  #persistent fevers  - recent blood cultures from 3/1 without any growth, and will continue with daptomycin and ertapenem  - continue to monitor for fever - fevers persist but leukocytosis improving which is reassuring; plan for washout by surgery tomorrow for better source control- appreciate ongoing ID input  - return to OR tomorrow for ostomy reversal + washout for intraabd abscess    #s/p left AKA  - s/p OR with vascular for wound closure  - diuretic and ARB still on hold.  Still continues to have fevers     #anemia of chronic disease  - hg 7.9 after 1 prbc - -today 8.4   - no e/o active bleeding  - asymptomatic  - transfusion goal > 7     #Suicidal Ideation  #Insomnia   - Continue mirtazapine 30mg QHS   - psychiatry following, and appreciate any input    # HFrEF, not in exacerbation   - TTE from 2/11 reviewed, normal LVEF   - Metoprolol succinate 25mg qd - continue  - not volume overloaded.  cont to hold entresto and spironolactone.      # Atrial Flutter  - s/p DCCV 12/30  - EP recs - uninterrupted AC x 30 days ; January 29th was 30 days post DCCV.   -Continue full dose Lovenox, amiodarone and metoprolol succinate    #hyponatremia  -nl Na 138      -# Pulmonary embolism (subsegmental RUL)   - After completion of AC for DCCV, would need to continue AC for PE - currently primary team holding lovenox for procedure     # Bowel ischemia - s/p SMA embolectomy; Small bowel resection  - ileostomy in place, returning to OR for reversa/ 3/7  - plan of primary team  - continue loperamide and diphenoxylate atropine    # Severe protein-calorie malnutrition  # Sacral wound   - appreciate input from nutrition  - Wound care per nursing      #DVT ppx - continue lovenox for PE when appropriate to resume by primary team

## 2023-03-06 NOTE — PROGRESS NOTE ADULT - ASSESSMENT
77M PMHx of IVDU found unresponsive at his nursing home, resolved after Narcan in the field and brought to [facility]. Found to have PE, atrial flutter, and large LV thrombus on echo. Transferred to [facility] for further management. Pt c/o abdominal pain on 12/10 CTA showing mid SMA with embolus. Abnormal distal small bowel loops and cecum with dilatation and pneumatosis suggesting infarcted bowel. Now s/p Ex lap, SMA embolectomy, 80cm SBR, abthera vac left in discontinuity (12/11). S/p second look (12/13), s/p OR for 3rd look, end-to-end anastomosis of remaining bowel, loop ileostomy and abdomen closure (12/15). S/p LORENZO and Cardioversion on 12/30 with cardiology. Patient was nutritionally optimized. 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 s/p L AKA closure (3/1). Now pending ostomy reversal.     -Possible OR week of 3/6 for ostomy reversal   -Regular diet with TPN via picc (2/20)   -Replete high ostomy output q6hr  -Continue Immodium/Tincture of Opium.  -Undrainable abdominal fluid collection   -ID following: Erta( 2/13- ),  (Dapto 2/11- )    -pain/nausea control prn   -Continue metoprolol/amiodarone   -Holding spironolactone, entresto   -SCDs, SQL [id number].   -Dispo: ROWAN        77M PMHx of IVDU found unresponsive at his nursing home, resolved after Narcan in the field and brought to [facility]. Found to have PE, atrial flutter, and large LV thrombus on echo. Transferred to [facility] for further management. Pt c/o abdominal pain on 12/10 CTA showing mid SMA with embolus. Abnormal distal small bowel loops and cecum with dilatation and pneumatosis suggesting infarcted bowel. Now s/p Ex lap, SMA embolectomy, 80cm SBR, abthera vac left in discontinuity (12/11). S/p second look (12/13), s/p OR for 3rd look, end-to-end anastomosis of remaining bowel, loop ileostomy and abdomen closure (12/15). S/p LORENZO and Cardioversion on 12/30 with cardiology. Patient was nutritionally optimized. 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 s/p L AKA closure (3/1). Now pending ostomy reversal.     -Preop for ileostomy reversal 3/7  -Regular diet with TPN via picc (2/20)   -Replete high ostomy output q6hr  -Continue Immodium/Tincture of Opium.  -Undrainable abdominal fluid collection   -ID following: Erta( 2/13- ),  (Dapto 2/11- )    -pain/nausea control prn   -Continue metoprolol/amiodarone   -Holding spironolactone, entresto   -SCDs, SQL [id number].   -Dispo: ROWAN

## 2023-03-06 NOTE — PROGRESS NOTE ADULT - SUBJECTIVE AND OBJECTIVE BOX
INTERVAL HPI/OVERNIGHT EVENTS:    Patient was seen and examined at bedside.  + fevers     CONSTITUTIONAL:  Negative fever or chills, feels well, good appetite  EYES:  Negative  blurry vision or double vision  CARDIOVASCULAR:  Negative for chest pain or palpitations  RESPIRATORY:  Negative for cough, wheezing, or SOB   GASTROINTESTINAL:  Negative for nausea, vomiting, diarrhea, constipation, or abdominal pain  GENITOURINARY:  Negative frequency, urgency or dysuria  NEUROLOGIC:  No headache, confusion, dizziness, lightheadedness      ANTIBIOTICS/RELEVANT:    MEDICATIONS  (STANDING):  aMIOdarone    Tablet 100 milliGRAM(s) Oral every 24 hours  ascorbic acid 500 milliGRAM(s) Oral daily  chlorhexidine 2% Cloths 1 Application(s) Topical daily  cholestyramine Powder (Sugar-Free) 2 Gram(s) Oral three times a day  DAPTOmycin IVPB 500 milliGRAM(s) IV Intermittent every 24 hours  ertapenem  IVPB 1000 milliGRAM(s) IV Intermittent every 24 hours  fat emulsion (Fish Oil and Plant Based) 20% Infusion 0.7764 Gm/kG/Day (20.8 mL/Hr) IV Continuous <Continuous>  influenza  Vaccine (HIGH DOSE) 0.7 milliLiter(s) IntraMuscular once  loperamide 4 milliGRAM(s) Oral every 8 hours  metoprolol succinate ER 25 milliGRAM(s) Oral daily  mirtazapine 30 milliGRAM(s) Oral at bedtime  multivitamin 1 Tablet(s) Oral daily  opium Tincture 6 milliGRAM(s) Oral every 12 hours  pantoprazole    Tablet 40 milliGRAM(s) Oral two times a day  Parenteral Nutrition - Adult 1 Each (83 mL/Hr) TPN Continuous <Continuous>  Parenteral Nutrition - Adult 1 Each (83 mL/Hr) TPN Continuous <Continuous>  povidone iodine 10% Solution 1 Application(s) Topical daily  psyllium Powder 1 Packet(s) Oral every 8 hours  sodium chloride 0.9% lock flush 10 milliLiter(s) IV Push every 8 hours  zinc sulfate 220 milliGRAM(s) Oral daily    MEDICATIONS  (PRN):  acetaminophen     Tablet .. 650 milliGRAM(s) Oral every 6 hours PRN Temp greater or equal to 38C (100.4F), Mild Pain (1 - 3)  HYDROmorphone  Injectable 0.5 milliGRAM(s) IV Push every 4 hours PRN breakthrough pain  melatonin 3 milliGRAM(s) Oral at bedtime PRN Insomnia  ondansetron Injectable 4 milliGRAM(s) IV Push every 6 hours PRN Nausea and/or Vomiting  oxyCODONE    IR 10 milliGRAM(s) Oral every 4 hours PRN Severe Pain (7 - 10)  oxyCODONE    IR 5 milliGRAM(s) Oral every 4 hours PRN Moderate Pain (4 - 6)  sodium chloride 0.9% lock flush 10 milliLiter(s) IV Push every 1 hour PRN Pre/post blood products, medications, blood draw, and to maintain line patency        Vital Signs Last 24 Hrs  T(C): 38 (06 Mar 2023 11:00), Max: 38.9 (05 Mar 2023 21:24)  T(F): 100.4 (06 Mar 2023 11:00), Max: 102 (05 Mar 2023 21:24)  HR: 102 (06 Mar 2023 11:00) (84 - 112)  BP: 123/91 (06 Mar 2023 08:33) (109/62 - 154/77)  BP(mean): 104 (06 Mar 2023 08:33) (80 - 106)  RR: 16 (06 Mar 2023 08:33) (16 - 18)  SpO2: 100% (06 Mar 2023 09:15) (100% - 100%)    Parameters below as of 06 Mar 2023 08:33  Patient On (Oxygen Delivery Method): room air        PHYSICAL EXAM:  Constitutional:  chronically ill appearing, frail  Eyes:  no icterus   Ear/Nose/Throat: no oral lesion  Neck:  supple  Respiratory: CTA keerthi  Cardiovascular: S1S2 RRR, no murmurs  Gastrointestinal:soft, (+) BS, no HSM  Extremities:no e/e/c  Vascular: DP Pulse:	right normal; left normal      LABS:                        8.4    13.74 )-----------( 675      ( 06 Mar 2023 05:30 )             27.0     03-06    138  |  103  |  9   ----------------------------<  118<H>  3.4<L>   |  24  |  0.43<L>    Ca    8.4      06 Mar 2023 05:30  Phos  3.1     03-06  Mg     2.0     03-06    TPro  6.6  /  Alb  2.1<L>  /  TBili  0.4  /  DBili  0.2  /  AST  22  /  ALT  36  /  AlkPhos  239<H>  03-06      Clostridium difficile Toxin by PCR (03.03.23 @ 10:09)    C Diff by PCR Result: Negative: By EIA, this specimen is positive for C. difficile glutamate  dehydrogenase (GDH) Antigen and negative for C. difficile Toxins A & B.  Follow-up PCR to detect toxigenic C. difficile was performed.  Negative results by PCR indicates this specimen is negative for toxin  gene sequences associated with toxin producing C. difficile.  Positive results by PCR indicates the patient may be a carrier or the  level of toxin in the sample was below the limit of detection of GDH by  EIA.  Repeat testing during the same episode of diarrhea is of limited value  and is discouraged. The results of these assays should always be  interpreted in conjunction with patient's clinical history.    COVID-19 PCR . (02.28.23 @ 19:52)    COVID-19 PCR: NotDetec: You can help in the fight against COVID-19. [facility] may contact  you to see if you are interested in voluntarily participating in one of  our clinical trials.  Testing is performed using polymerase chain reaction (PCR) or  transcription mediated amplification (TMA). This COVID-19 (SARS-CoV-2)  nucleic acid amplification test was validated by [facility] and is in use under the FDA Emergency Use Authorization  (EUA) for clinical labs CLIA-certified to perform high complexity  testing. Test results should be correlated with clinical presentation,  patient history, and epidemiology.        MICROBIOLOGY:    Culture - Blood (03.01.23 @ 23:50)    Specimen Source: .Blood Blood-Venous    Culture Results:   No growth at 4 days.    Culture - Blood (02.25.23 @ 20:15)    Specimen Source: .Blood Blood    Culture Results:   No growth at 5 days.      RADIOLOGY & ADDITIONAL STUDIES:        < from: CT Abdomen and Pelvis w/ Oral Cont and w/ IV Cont (03.02.23 @ 14:22) >  BOWEL: Redemonstrated cecal colitis, diffuse mural edema and mucosal   hyperenhancement. No pneumatosis. Loop ileostomy. Right lower quadrant   approach enteric tube with inflated balloon in small bowel loop. No   extraluminal contrast extravasation. Rectal temperature probe in situ. No   bowel obstruction. Appendix not confidently visualized.  PERITONEUM: Redemonstrated thick-walled fluid collection in right lower   quadrant 6 x 4.5 x 6 cm, stable since February 12, 2023, slightly   decreased from February 11, 2023. Small pelvic ascites. No free air.  VESSELS: Atherosclerotic changes.  RETROPERITONEUM/LYMPH NODES: No lymphadenopathy.  ABDOMINAL WALL: Cachexia.  BONES: Degenerative changes.    IMPRESSION:  Since February 12, 2023, unchanged fluid collection in right lower   quadrant.  Unchanged cecal colitis.  Unchanged small volume ascites.  Increased small right pleural effusion with mild passive atelectasis.    < end of copied text >

## 2023-03-06 NOTE — PROGRESS NOTE ADULT - SUBJECTIVE AND OBJECTIVE BOX
STATUS POST:      Interval events: Febrile overnight - TMax 102     SUBJECTIVE: Patient seen and examined bedside by chief resident. This morning, he feels at baseline; his pain is well-controlled. No nausea or vomiting. Ileostomy still high output. No acute complaints.     aMIOdarone    Tablet 100 milliGRAM(s) Oral every 24 hours  DAPTOmycin IVPB 500 milliGRAM(s) IV Intermittent every 24 hours  enoxaparin Injectable 65 milliGRAM(s) SubCutaneous every 12 hours  ertapenem  IVPB 1000 milliGRAM(s) IV Intermittent every 24 hours  metoprolol succinate ER 25 milliGRAM(s) Oral daily      Vital Signs Last 24 Hrs  T(C): 37.8 (06 Mar 2023 06:00), Max: 38.9 (05 Mar 2023 21:24)  T(F): 100 (06 Mar 2023 06:00), Max: 102 (05 Mar 2023 21:24)  HR: 90 (06 Mar 2023 06:00) (84 - 100)  BP: 143/78 (06 Mar 2023 06:00) (109/62 - 154/77)  BP(mean): 103 (06 Mar 2023 06:00) (80 - 106)  RR: 18 (06 Mar 2023 06:00) (16 - 18)  SpO2: 100% (06 Mar 2023 06:00) (100% - 100%)    Parameters below as of 06 Mar 2023 06:00  Patient On (Oxygen Delivery Method): room air      I&O's Detail    05 Mar 2023 07:01  -  06 Mar 2023 07:00  --------------------------------------------------------  IN:    Fat Emulsion (Fish Oil &amp; Plant Based) 20% Infusion: 41.6 mL    Fat Emulsion (Fish Oil &amp; Plant Based) 20% Infusion: 208 mL    IV PiggyBack: 100 mL    IV PiggyBack: 100 mL    Lactated Ringers Bolus: 2250 mL    Oral Fluid: 450 mL    TPN (Total Parenteral Nutrition): 1992 mL  Total IN: 5141.6 mL    OUT:    Ileostomy (mL): 3325 mL    Voided (mL): 1275 mL  Total OUT: 4600 mL    Total NET: 541.6 mL          General: NAD, resting comfortably in bed  C/V: NSR  Pulm: Nonlabored breathing, no respiratory distress  Abd: soft, NT/ND. Ileostomy p/p/p high output light brown, incisions c/d/i  Extrem: s/p L AKA, dressing in place recently changed         LABS:                        7.9    12.60 )-----------( 581      ( 05 Mar 2023 06:11 )             24.8     03-05    137  |  106  |  11  ----------------------------<  126<H>  3.7   |  20<L>  |  0.42<L>    Ca    8.1<L>      05 Mar 2023 06:11  Phos  2.7     03-05  Mg     2.0     03-05            RADIOLOGY & ADDITIONAL STUDIES:

## 2023-03-06 NOTE — CHART NOTE - NSCHARTNOTEFT_GEN_A_CORE
Admitting Diagnosis:   Patient is a 78y old  Male who presents with a chief complaint of A flutter (06 Mar 2023 11:53)    Current Nutrition Order:  Regular diet with Ensure Max TID (150 kcal, 30g pro each)  TPN via central line:  275g Dex, 90g AA, 50g 20% SMOF Lipids to provide in total 1795 Kcal, 90g protein, GIR 2.98, 1.4g/kg ABW protein    PO Intake: Good (%) [   ]  Fair (50-75%) [   ] Poor (<25%) [   ]- consuming <50% of meals    GI Issues:   WDL, last BM 3/5  No n/v/c noted +diarrhea noted  Ileostomy (3325 ml/24hrs)  No abdominal discomfort or distention noted    Pain:   7/10 left leg and lower back pain noted    Skin Integrity:  Surgical incision noted, Buddy: 12  New left AKA as of 2/15  No edema noted  pressure ulcer: sacral spine stage III pressure ulcer, unstageable upper back pressure ulcer    Labs:   03-06    138  |  103  |  9   ----------------------------<  118<H>  3.4<L>   |  24  |  0.43<L>    Ca    8.4      06 Mar 2023 05:30  Phos  3.1     03-06  Mg     2.0     03-06    TPro  6.6  /  Alb  2.1<L>  /  TBili  0.4  /  DBili  0.2  /  AST  22  /  ALT  36  /  AlkPhos  239<H>  03-06    Medications:  MEDICATIONS  (STANDING):  aMIOdarone    Tablet 100 milliGRAM(s) Oral every 24 hours  ascorbic acid 500 milliGRAM(s) Oral daily  chlorhexidine 2% Cloths 1 Application(s) Topical daily  cholestyramine Powder (Sugar-Free) 2 Gram(s) Oral three times a day  DAPTOmycin IVPB 500 milliGRAM(s) IV Intermittent every 24 hours  ertapenem  IVPB 1000 milliGRAM(s) IV Intermittent every 24 hours  fat emulsion (Fish Oil and Plant Based) 20% Infusion 0.7764 Gm/kG/Day (20.83 mL/Hr) IV Continuous <Continuous>  influenza  Vaccine (HIGH DOSE) 0.7 milliLiter(s) IntraMuscular once  lactated ringers Bolus 700 milliLiter(s) IV Bolus once  loperamide 4 milliGRAM(s) Oral every 8 hours  metoprolol succinate ER 25 milliGRAM(s) Oral daily  mirtazapine 30 milliGRAM(s) Oral at bedtime  multivitamin 1 Tablet(s) Oral daily  opium Tincture 6 milliGRAM(s) Oral every 12 hours  pantoprazole    Tablet 40 milliGRAM(s) Oral two times a day  Parenteral Nutrition - Adult 1 Each (83 mL/Hr) TPN Continuous <Continuous>  Parenteral Nutrition - Adult 1 Each (83 mL/Hr) TPN Continuous <Continuous>  povidone iodine 10% Solution 1 Application(s) Topical daily  psyllium Powder 1 Packet(s) Oral every 8 hours  sodium chloride 0.9% lock flush 10 milliLiter(s) IV Push every 8 hours  zinc sulfate 220 milliGRAM(s) Oral daily    MEDICATIONS  (PRN):  acetaminophen     Tablet .. 650 milliGRAM(s) Oral every 6 hours PRN Temp greater or equal to 38C (100.4F), Mild Pain (1 - 3)  HYDROmorphone  Injectable 0.5 milliGRAM(s) IV Push every 4 hours PRN breakthrough pain  melatonin 3 milliGRAM(s) Oral at bedtime PRN Insomnia  ondansetron Injectable 4 milliGRAM(s) IV Push every 6 hours PRN Nausea and/or Vomiting  oxyCODONE    IR 10 milliGRAM(s) Oral every 4 hours PRN Severe Pain (7 - 10)  oxyCODONE    IR 5 milliGRAM(s) Oral every 4 hours PRN Moderate Pain (4 - 6)  sodium chloride 0.9% lock flush 10 milliLiter(s) IV Push every 1 hour PRN Pre/post blood products, medications, blood draw, and to maintain line patency    Weight:  Daily     Daily     Weight Change:     Nutrition Focused Physical Exam: Completed [   ]  Not Pertinent [   ]  Muscle Wasting- Temporal [   ]  Clavicle/Pectoral [   ]  Shoulder/Deltoid [   ]  Scapula [   ]  Interosseous [   ]  Quadriceps [   ]  Gastrocnemius [   ]  Fat Wasting- Orbital [   ]  Buccal [   ]  Triceps [   ]  Rib [   ]  Suspect [PCM] 2/2 to physical assessment, [poor intake], and [wt loss]; please see malnutrition chart note.    Estimated energy needs:     Subjective:     Previous Nutrition Diagnosis:    Active [   ]  Resolved [   ]    If resolved, new PES:     Goal:    Recommendations:    Education:     Risk Level: High [   ] Moderate [   ] Low [   ] Admitting Diagnosis:   Patient is a 78y old  Male who presents with a chief complaint of A flutter (06 Mar 2023 11:53)    Current Nutrition Order:  Regular diet with Ensure Max TID (150 kcal, 30g pro each)  TPN via central line:  275g Dex, 90g AA, 50g 20% SMOF Lipids to provide in total 1795 Kcal, 90g protein, GIR 2.98, 1.4g/kg ABW protein    PO Intake: Good (%) [   ]  Fair (50-75%) [   ] Poor (<25%) [   ]- consuming <50% of meals    GI Issues:   WDL, last BM 3/5  No n/v/c noted +diarrhea noted  Ileostomy (3325 ml/24hrs)  No abdominal discomfort or distention noted    Pain:   7/10 left leg and lower back pain noted    Skin Integrity:  Surgical incision noted, Buddy: 12  New left AKA as of 2/15  No edema noted  pressure ulcer: sacral spine stage III pressure ulcer, unstageable upper back pressure ulcer    Labs:       138  |  103  |  9   ----------------------------<  118<H>  3.4<L>   |  24  |  0.43<L>    Ca    8.4      06 Mar 2023 05:30  Phos  3.1     -  Mg     2.0         TPro  6.6  /  Alb  2.1<L>  /  TBili  0.4  /  DBili  0.2  /  AST  22  /  ALT  36  /  AlkPhos  239<H>      Medications:  MEDICATIONS  (STANDING):  aMIOdarone    Tablet 100 milliGRAM(s) Oral every 24 hours  ascorbic acid 500 milliGRAM(s) Oral daily  chlorhexidine 2% Cloths 1 Application(s) Topical daily  cholestyramine Powder (Sugar-Free) 2 Gram(s) Oral three times a day  DAPTOmycin IVPB 500 milliGRAM(s) IV Intermittent every 24 hours  ertapenem  IVPB 1000 milliGRAM(s) IV Intermittent every 24 hours  fat emulsion (Fish Oil and Plant Based) 20% Infusion 0.7764 Gm/kG/Day (20.83 mL/Hr) IV Continuous <Continuous>  influenza  Vaccine (HIGH DOSE) 0.7 milliLiter(s) IntraMuscular once  lactated ringers Bolus 700 milliLiter(s) IV Bolus once  loperamide 4 milliGRAM(s) Oral every 8 hours  metoprolol succinate ER 25 milliGRAM(s) Oral daily  mirtazapine 30 milliGRAM(s) Oral at bedtime  multivitamin 1 Tablet(s) Oral daily  opium Tincture 6 milliGRAM(s) Oral every 12 hours  pantoprazole    Tablet 40 milliGRAM(s) Oral two times a day  Parenteral Nutrition - Adult 1 Each (83 mL/Hr) TPN Continuous <Continuous>  Parenteral Nutrition - Adult 1 Each (83 mL/Hr) TPN Continuous <Continuous>  povidone iodine 10% Solution 1 Application(s) Topical daily  psyllium Powder 1 Packet(s) Oral every 8 hours  sodium chloride 0.9% lock flush 10 milliLiter(s) IV Push every 8 hours  zinc sulfate 220 milliGRAM(s) Oral daily    MEDICATIONS  (PRN):  acetaminophen     Tablet .. 650 milliGRAM(s) Oral every 6 hours PRN Temp greater or equal to 38C (100.4F), Mild Pain (1 - 3)  HYDROmorphone  Injectable 0.5 milliGRAM(s) IV Push every 4 hours PRN breakthrough pain  melatonin 3 milliGRAM(s) Oral at bedtime PRN Insomnia  ondansetron Injectable 4 milliGRAM(s) IV Push every 6 hours PRN Nausea and/or Vomiting  oxyCODONE    IR 10 milliGRAM(s) Oral every 4 hours PRN Severe Pain (7 - 10)  oxyCODONE    IR 5 milliGRAM(s) Oral every 4 hours PRN Moderate Pain (4 - 6)  sodium chloride 0.9% lock flush 10 milliLiter(s) IV Push every 1 hour PRN Pre/post blood products, medications, blood draw, and to maintain line patency    Admitting Anthropometrics:  6'  pounds +-10%  Wt 142 pounds BMI 16.4 %IBW=66%   New adjusted IBW (w/ L AKA): 178lbs/ 81.3kg     Weight Change:   2/15 141.7 pounds   141.9 pounds - dosing wt    136 pounds - Bedscale wt   2/15 142lbs    **PCM:  >> Reports having 1-2 meals/day + ONS. Per pt claims to have 2 ensure/day and 2 nutrament/day - unclear how accurate this is. ?If pt meeting ~75% EER consistently.  >> Does report wt loss.  pounds x6 months ago. Thinks he now is 135 pounds which is consistent with bedscale wt obtained during initial visit by  pounds. Suggest wt loss of 49 pounds/26% body wt loss.  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     Estimated energy needs:  ABW used for calculations as pt between % of IBW (80%) Adjust for age, malnutrition, EF, S/p OR, Pressure ulcer; fluids per team or as recommended below  30-35kcal/k-2275kcal/day   1.4-1.6gm/k-104gm prot/day   30-35kcal/k-2275kcal/day   **Rec upper end of needs     Subjective:   77M with PMHx of IVDU found unresponsive at his nursing home, resolved after Narcan in the field and brought to [facility]. Found to have PE, atrial flutter, and large LV thrombus on echo. Transferred to [facility] for further management. Pt c/o abdominal pain on 12/10 CTA showing mid SMA with embolus. Abnormal distal small bowel loops and cecum with dilatation and pneumatosis suggesting infarcted bowel. One or two tiny foci of intrahepatic portal vein pneumatosis. Segmental infarction upper pole left kidney. Now s/p Ex lap, SMA embolectomy, 80cm SBR, abthera vac left in discontinuity () and transferred to SICU intubated. S/p second look () and most recently s/p OR for 3rd look, end-to-end anastomosis of remaining bowel, loop ileostomy and abdomen closure (12/15). Transferred to CCU on  for cardiac optimization and now transferred back to SICU  for bleeding hemodynamic instability. Echo  shows EF 10-15% with overall hypokinesis. CTA performed demonstrating large hematoma in R abdomen, new hemoperitoneum, and bilateral pleural effusions. Remains in SICU, now extubated with still with limb ischemia to LLE pending amputation and AC held given BRBPR and hematoma; noted pt agreeable for AKA when feasible - AKA currently Pending Cards. Pt s/p DCCV on  (negative LORENZO on ) with successful conversion to NSR. Planning for AKA with vascular surgery once nutrition status is optimize. Team placed PICC 1/10 and initiated supplemental TPN now weaned off with constant encouragement of PO intake. CT A/P  showing RLQ fluid collection. PPN held / bacteremia. More recently found to have k. 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 PICC placed  now plan to restart TPN. S/p RTOR for L AKA closure 3/1.     On assessment, pt resting in bed. Currently on regular diet with Ensure Max TID (450 kcal, 90g pro) with TPN running at goal rate meeting 100% of est needs. Pt consuming ~50% of meals. Cont high output via ostomy (3325 ml/24hrs) with high suspicion of cont malabsorption of PO intake. No n/v/c noted. +loose stools via ostomy. RD to follow.     Prior PES: Malnutrition Severe in Chronic state RT presumed intake<EER AEB NFPE, wt loss, PO intake  >>on going  Goal: Pt will meet at least 75% of nutrient needs via feasible route / Show no further s/s of malnutrition    Recommendations:  1. Cont with Regular diet with Ensure Max TID (450 kcal, 90g pro) when medically feasible  2. Due to persistent malabsorption, recommend resuming TPN as medically feasible/appropriate. Recommend; 275g Dex, 90g AA, 50g SMOF lipids to provide 1795 kcal, 90g pro, GIR 2.93, 1.38 g/kg pro ABW. RD to follow.  >> Cont to trend TG weekly  3. Recommend continue MVI daily  4. Monitor Skin, Wts daily, GOC. Pain/GI per team.   >>Cont with opium tincture, imodium and metamucil per team  5. Monitor lytes and replete prn. POC BG q6hrs     Education:  RD cont to encourage adequate PO intake as tolerated.     Risk Level: High [ X ] Moderate [   ] Low [   ].

## 2023-03-07 NOTE — PRE-ANESTHESIA EVALUATION ADULT - NSANTHPMHFT_GEN_ALL_CORE
77M PMHx of IVDU found unresponsive at his nursing home, resolved after Narcan in the field and brought to [facility]. Found to have PE, atrial flutter, and large LV thrombus on echo. Transferred to [facility] for further management. Pt c/o abdominal pain on 12/10 CTA showing mid SMA with embolus. Abnormal distal small bowel loops and cecum with dilatation and pneumatosis suggesting infarcted bowel. Now s/p Ex lap, SMA embolectomy, 80cm SBR, abthera vac left in discontinuity (12/11). S/p second look (12/13), s/p OR for 3rd look, end-to-end anastomosis of remaining bowel, loop ileostomy and abdomen closure (12/15). S/p LORENZO and Cardioversion on 12/30 with cardiology. Patient was nutritionally optimized. 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 s/p L AKA closure (3/1).

## 2023-03-07 NOTE — PROGRESS NOTE ADULT - SUBJECTIVE AND OBJECTIVE BOX
Patient is a 78y old  Male who presents with a chief complaint of A flutter (07 Mar 2023 10:56)      INTERVAL HPI/OVERNIGHT EVENTS: temp max 101 in 24 hrs ]    MEDICATIONS  (STANDING):  aMIOdarone    Tablet 100 milliGRAM(s) Oral every 24 hours  ascorbic acid 500 milliGRAM(s) Oral daily  chlorhexidine 2% Cloths 1 Application(s) Topical daily  cholestyramine Powder (Sugar-Free) 2 Gram(s) Oral three times a day  DAPTOmycin IVPB 500 milliGRAM(s) IV Intermittent every 24 hours  ertapenem  IVPB 1000 milliGRAM(s) IV Intermittent every 24 hours  fat emulsion (Fish Oil and Plant Based) 20% Infusion 0.7764 Gm/kG/Day (20.83 mL/Hr) IV Continuous <Continuous>  influenza  Vaccine (HIGH DOSE) 0.7 milliLiter(s) IntraMuscular once  loperamide 4 milliGRAM(s) Oral every 8 hours  metoprolol succinate ER 25 milliGRAM(s) Oral daily  mirtazapine 30 milliGRAM(s) Oral at bedtime  multivitamin 1 Tablet(s) Oral daily  opium Tincture 6 milliGRAM(s) Oral every 12 hours  pantoprazole    Tablet 40 milliGRAM(s) Oral two times a day  Parenteral Nutrition - Adult 1 Each (83 mL/Hr) TPN Continuous <Continuous>  Parenteral Nutrition - Adult 1 Each (83 mL/Hr) TPN Continuous <Continuous>  povidone iodine 10% Solution 1 Application(s) Topical daily  psyllium Powder 1 Packet(s) Oral every 8 hours  sodium chloride 0.9% lock flush 10 milliLiter(s) IV Push every 8 hours  zinc sulfate 220 milliGRAM(s) Oral daily    MEDICATIONS  (PRN):  acetaminophen     Tablet .. 650 milliGRAM(s) Oral every 6 hours PRN Temp greater or equal to 38C (100.4F), Mild Pain (1 - 3)  HYDROmorphone  Injectable 0.5 milliGRAM(s) IV Push every 4 hours PRN breakthrough pain  melatonin 3 milliGRAM(s) Oral at bedtime PRN Insomnia  ondansetron Injectable 4 milliGRAM(s) IV Push every 6 hours PRN Nausea and/or Vomiting  oxyCODONE    IR 10 milliGRAM(s) Oral every 4 hours PRN Severe Pain (7 - 10)  oxyCODONE    IR 5 milliGRAM(s) Oral every 4 hours PRN Moderate Pain (4 - 6)  sodium chloride 0.9% lock flush 10 milliLiter(s) IV Push every 1 hour PRN Pre/post blood products, medications, blood draw, and to maintain line patency      __________________________________________________  REVIEW OF SYSTEMS:    CONSTITUTIONAL: + fever  EYES: no acute visual disturbances  NECK: No pain or stiffness  RESPIRATORY: No cough; No shortness of breath  CARDIOVASCULAR: No chest pain, no palpitations  GASTROINTESTINAL: No pain. No nausea or vomiting; No diarrhea   NEUROLOGICAL: No headache or numbness, no tremors  MUSCULOSKELETAL: positive R knee joint pain, no muscle pain  GENITOURINARY: no dysuria, no frequency, no hesitancy  PSYCHIATRY: no depression , no anxiety  ALL OTHER  ROS negative        Vital Signs Last 24 Hrs  T(C): 38.4 (07 Mar 2023 11:18), Max: 38.4 (07 Mar 2023 09:36)  T(F): 101.1 (07 Mar 2023 09:36), Max: 101.1 (07 Mar 2023 09:36)  HR: 92 (07 Mar 2023 11:18) (92 - 106)  BP: 117/72 (07 Mar 2023 11:18) (117/61 - 146/76)  BP(mean): 88 (07 Mar 2023 11:18) (81 - 103)  RR: 18 (07 Mar 2023 11:18) (15 - 18)  SpO2: 100% (07 Mar 2023 11:18) (100% - 100%)    Parameters below as of 07 Mar 2023 11:18    O2 Flow (L/min): 10  O2 Concentration (%): 40    ________________________________________________  PHYSICAL EXAM:  GENERAL: NAD  HEENT: Normocephalic;  conjunctivae and sclerae clear; moist mucous membranes;   NECK : supple  CHEST/LUNG: Clear to auscultation bilaterally with good air entry   HEART: S1 S2  regular; no murmurs, gallops or rubs  ABDOMEN: Soft, Nontender, Nondistended; Bowel sounds present  EXTREMITIES: no cyanosis; no edema; no calf tenderness, L AKA   SKIN: un stageable sacral ulcer, RLE with big toe healing ulceration   NERVOUS SYSTEM:  Awake and alert; Oriented  to place, person and time ; no new deficits    _________________________________________________  LABS:                        7.8    14.59 )-----------( 654      ( 07 Mar 2023 07:10 )             24.0     03-07    138  |  102  |  7   ----------------------------<  125<H>  3.6   |  28  |  0.43<L>    Ca    8.2<L>      07 Mar 2023 07:10  Phos  3.5     03-07  Mg     2.8     03-07    TPro  6.6  /  Alb  2.1<L>  /  TBili  0.4  /  DBili  0.2  /  AST  22  /  ALT  36  /  AlkPhos  239<H>  03-06    PT/INR - ( 07 Mar 2023 07:10 )   PT: 15.2 sec;   INR: 1.27          PTT - ( 07 Mar 2023 07:10 )  PTT:33.4 sec    CAPILLARY BLOOD GLUCOSE            RADIOLOGY & ADDITIONAL TESTS:      Plan of care was discussed with patient and /or primary care giver; all questions and concerns were addressed and care was aligned with patient's wishes.

## 2023-03-07 NOTE — PRE-ANESTHESIA EVALUATION ADULT - MALLAMPATI CLASS
Class II - visualization of the soft palate, fauces, and uvula
pt intubated/Class II - visualization of the soft palate, fauces, and uvula
Class II - visualization of the soft palate, fauces, and uvula

## 2023-03-07 NOTE — PROGRESS NOTE ADULT - ASSESSMENT
77M PMHx of IVDU found unresponsive at his nursing home, resolved after Narcan in the field and brought to [facility]. Found to have PE, atrial flutter, and large LV thrombus on echo. Transferred to [facility] for further management. Pt c/o abdominal pain on 12/10 CTA showing mid SMA with embolus. Abnormal distal small bowel loops and cecum with dilatation and pneumatosis suggesting infarcted bowel. Now s/p Ex lap, SMA embolectomy, 80cm SBR, abthera vac left in discontinuity (12/11). S/p second look (12/13), s/p OR for 3rd look, end-to-end anastomosis of remaining bowel, loop ileostomy and abdomen closure (12/15). S/p LORENZO and Cardioversion on 12/30 with cardiology. Patient was nutritionally optimized. 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 s/p L AKA closure (3/1), s/p ileostomy reversal (3/7).      -Regular diet with TPN via picc (2/20), NPO @ [facility]  -Replete high ostomy output q6hr  -Continue Immodium/Tincture of Opium.  -Undrainable abdominal fluid collection   -ID following: Erta( 2/13- ),  (Dapto 2/11- )    -pain/nausea control prn   -Continue metoprolol/amiodarone   -Holding spironolactone, entresto   -SCDs, SQL 65BID.   -Dispo: ROWAN    77M PMHx of IVDU found unresponsive at his nursing home, resolved after Narcan in the field and brought to [facility]. Found to have PE, atrial flutter, and large LV thrombus on echo. Transferred to [facility] for further management. Pt c/o abdominal pain on 12/10 CTA showing mid SMA with embolus. Abnormal distal small bowel loops and cecum with dilatation and pneumatosis suggesting infarcted bowel. Now s/p Ex lap, SMA embolectomy, 80cm SBR, abthera vac left in discontinuity (12/11). S/p second look (12/13), s/p OR for 3rd look, end-to-end anastomosis of remaining bowel, loop ileostomy and abdomen closure (12/15). S/p LORENZO and Cardioversion on 12/30 with cardiology. Patient was nutritionally optimized. 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 s/p L AKA closure (3/1), s/p ileostomy reversal (3/7).      -Regular diet with TPN via picc (2/20), NPO @ [facility]  -ID following: Erta( 2/13- ),  (Dapto 2/11- )    -pain/nausea control prn   -Continue metoprolol/amiodarone   -Holding spironolactone, entresto   -SCDs, SQL 65BID.   -Dispo: ROWAN

## 2023-03-07 NOTE — PROGRESS NOTE ADULT - SUBJECTIVE AND OBJECTIVE BOX
SUBJECTIVE: Patient seen and evaluated. Preop for OR today        MEDICATIONS  (STANDING):  aMIOdarone    Tablet 100 milliGRAM(s) Oral every 24 hours  ascorbic acid 500 milliGRAM(s) Oral daily  chlorhexidine 2% Cloths 1 Application(s) Topical daily  cholestyramine Powder (Sugar-Free) 2 Gram(s) Oral three times a day  DAPTOmycin IVPB 500 milliGRAM(s) IV Intermittent every 24 hours  ertapenem  IVPB 1000 milliGRAM(s) IV Intermittent every 24 hours  fat emulsion (Fish Oil and Plant Based) 20% Infusion 0.7764 Gm/kG/Day (20.83 mL/Hr) IV Continuous <Continuous>  influenza  Vaccine (HIGH DOSE) 0.7 milliLiter(s) IntraMuscular once  loperamide 4 milliGRAM(s) Oral every 8 hours  metoprolol succinate ER 25 milliGRAM(s) Oral daily  mirtazapine 30 milliGRAM(s) Oral at bedtime  multivitamin 1 Tablet(s) Oral daily  opium Tincture 6 milliGRAM(s) Oral every 12 hours  pantoprazole    Tablet 40 milliGRAM(s) Oral two times a day  Parenteral Nutrition - Adult 1 Each (83 mL/Hr) TPN Continuous <Continuous>  povidone iodine 10% Solution 1 Application(s) Topical daily  psyllium Powder 1 Packet(s) Oral every 8 hours  sodium chloride 0.9% lock flush 10 milliLiter(s) IV Push every 8 hours  zinc sulfate 220 milliGRAM(s) Oral daily    MEDICATIONS  (PRN):  acetaminophen     Tablet .. 650 milliGRAM(s) Oral every 6 hours PRN Temp greater or equal to 38C (100.4F), Mild Pain (1 - 3)  HYDROmorphone  Injectable 0.5 milliGRAM(s) IV Push every 4 hours PRN breakthrough pain  melatonin 3 milliGRAM(s) Oral at bedtime PRN Insomnia  ondansetron Injectable 4 milliGRAM(s) IV Push every 6 hours PRN Nausea and/or Vomiting  oxyCODONE    IR 10 milliGRAM(s) Oral every 4 hours PRN Severe Pain (7 - 10)  oxyCODONE    IR 5 milliGRAM(s) Oral every 4 hours PRN Moderate Pain (4 - 6)  sodium chloride 0.9% lock flush 10 milliLiter(s) IV Push every 1 hour PRN Pre/post blood products, medications, blood draw, and to maintain line patency      Vital Signs Last 24 Hrs  T(C): 38.3 (07 Mar 2023 04:32), Max: 38.4 (06 Mar 2023 09:15)  T(F): 100.9 (07 Mar 2023 04:32), Max: 101.1 (06 Mar 2023 09:15)  HR: 98 (07 Mar 2023 04:03) (96 - 112)  BP: 146/76 (07 Mar 2023 04:03) (117/61 - 146/76)  BP(mean): 103 (07 Mar 2023 04:03) (81 - 104)  RR: 15 (07 Mar 2023 04:03) (15 - 18)  SpO2: 100% (07 Mar 2023 04:03) (100% - 100%)    Parameters below as of 07 Mar 2023 04:03  Patient On (Oxygen Delivery Method): room air        Physical Exam:  General: NAD, resting comfortably in bed  Pulmonary: Nonlabored breathing, no respiratory distress  Cardiovascular: NSR  Abdominal: soft, NT/ND  Extremities: WWP, normal strength, L AKA staple line c/d/i, no discharge  Neuro: A/O x 3, CNs II-XII grossly intact, no focal deficits  I&O's Summary    05 Mar 2023 07:01  -  06 Mar 2023 07:00  --------------------------------------------------------  IN: 5941.6 mL / OUT: 4600 mL / NET: 1341.6 mL    06 Mar 2023 07:01  -  07 Mar 2023 06:58  --------------------------------------------------------  IN: 3695.6 mL / OUT: 4745 mL / NET: -1049.4 mL        LABS:                        8.4    13.74 )-----------( 675      ( 06 Mar 2023 05:30 )             27.0     03-06    138  |  103  |  9   ----------------------------<  118<H>  3.4<L>   |  24  |  0.43<L>    Ca    8.4      06 Mar 2023 05:30  Phos  3.1     03-06  Mg     2.0     03-06    TPro  6.6  /  Alb  2.1<L>  /  TBili  0.4  /  DBili  0.2  /  AST  22  /  ALT  36  /  AlkPhos  239<H>  03-06        CAPILLARY BLOOD GLUCOSE        LIVER FUNCTIONS - ( 06 Mar 2023 05:30 )  Alb: 2.1 g/dL / Pro: 6.6 g/dL / ALK PHOS: 239 U/L / ALT: 36 U/L / AST: 22 U/L / GGT: x             RADIOLOGY & ADDITIONAL STUDIES:

## 2023-03-07 NOTE — PRE-ANESTHESIA EVALUATION ADULT - NSPROPOSEDPROCEDFT_GEN_ALL_CORE
Exploratory Laparotomy.  Closure of Ileostomy,  Abdominal washout Exploratory Laparotomy.  Closure of Ileostomy,  Drainage of intrabdominal abcess.  Abdominal washout

## 2023-03-07 NOTE — PRE-ANESTHESIA EVALUATION ADULT - NSANTHAPLANRD_GEN_ALL_CORE
monitored anesthesia care (MAC)
general
general
monitored anesthesia care (MAC)
general

## 2023-03-07 NOTE — PRE-ANESTHESIA EVALUATION ADULT - LAST ECHOCARDIOGRAM
Low EF, LV thrombus EF 10-15% 2/23 Normal LV function. Mildly dilated RV with decreased function. PASP 34mmHg

## 2023-03-07 NOTE — PROGRESS NOTE ADULT - SUBJECTIVE AND OBJECTIVE BOX
INTERVAL HPI/OVERNIGHT EVENTS:    Patient was seen and examined at bedside.  Awaiting OR today for reversal of ileostomy.  Remains febrile     CONSTITUTIONAL:  Negative fever or chills, feels well, good appetite  EYES:  Negative  blurry vision or double vision  CARDIOVASCULAR:  Negative for chest pain or palpitations  RESPIRATORY:  Negative for cough, wheezing, or SOB   GASTROINTESTINAL:  Negative for nausea, vomiting, diarrhea, constipation, or abdominal pain  GENITOURINARY:  Negative frequency, urgency or dysuria  NEUROLOGIC:  No headache, confusion, dizziness, lightheadedness      ANTIBIOTICS/RELEVANT:    MEDICATIONS  (STANDING):  aMIOdarone    Tablet 100 milliGRAM(s) Oral every 24 hours  ascorbic acid 500 milliGRAM(s) Oral daily  chlorhexidine 2% Cloths 1 Application(s) Topical daily  cholestyramine Powder (Sugar-Free) 2 Gram(s) Oral three times a day  DAPTOmycin IVPB 500 milliGRAM(s) IV Intermittent every 24 hours  ertapenem  IVPB 1000 milliGRAM(s) IV Intermittent every 24 hours  fat emulsion (Fish Oil and Plant Based) 20% Infusion 0.7764 Gm/kG/Day (20.83 mL/Hr) IV Continuous <Continuous>  fat emulsion (Fish Oil and Plant Based) 20% Infusion 0.7764 Gm/kG/Day (20.83 mL/Hr) IV Continuous <Continuous>  influenza  Vaccine (HIGH DOSE) 0.7 milliLiter(s) IntraMuscular once  loperamide 4 milliGRAM(s) Oral every 8 hours  metoprolol succinate ER 25 milliGRAM(s) Oral daily  mirtazapine 30 milliGRAM(s) Oral at bedtime  multivitamin 1 Tablet(s) Oral daily  opium Tincture 6 milliGRAM(s) Oral every 12 hours  pantoprazole    Tablet 40 milliGRAM(s) Oral two times a day  Parenteral Nutrition - Adult 1 Each (83 mL/Hr) TPN Continuous <Continuous>  Parenteral Nutrition - Adult 1 Each (83 mL/Hr) TPN Continuous <Continuous>  povidone iodine 10% Solution 1 Application(s) Topical daily  psyllium Powder 1 Packet(s) Oral every 8 hours  sodium chloride 0.9% lock flush 10 milliLiter(s) IV Push every 8 hours  zinc sulfate 220 milliGRAM(s) Oral daily    MEDICATIONS  (PRN):  acetaminophen     Tablet .. 650 milliGRAM(s) Oral every 6 hours PRN Temp greater or equal to 38C (100.4F), Mild Pain (1 - 3)  HYDROmorphone  Injectable 0.5 milliGRAM(s) IV Push every 4 hours PRN breakthrough pain  melatonin 3 milliGRAM(s) Oral at bedtime PRN Insomnia  ondansetron Injectable 4 milliGRAM(s) IV Push every 6 hours PRN Nausea and/or Vomiting  oxyCODONE    IR 10 milliGRAM(s) Oral every 4 hours PRN Severe Pain (7 - 10)  oxyCODONE    IR 5 milliGRAM(s) Oral every 4 hours PRN Moderate Pain (4 - 6)  sodium chloride 0.9% lock flush 10 milliLiter(s) IV Push every 1 hour PRN Pre/post blood products, medications, blood draw, and to maintain line patency        Vital Signs Last 24 Hrs  T(C): 38.4 (07 Mar 2023 09:36), Max: 38.4 (07 Mar 2023 09:36)  T(F): 101.1 (07 Mar 2023 09:36), Max: 101.1 (07 Mar 2023 09:36)  HR: 92 (07 Mar 2023 08:51) (92 - 106)  BP: 117/72 (07 Mar 2023 08:51) (117/61 - 146/76)  BP(mean): 88 (07 Mar 2023 08:51) (81 - 103)  RR: 18 (07 Mar 2023 08:51) (15 - 18)  SpO2: 100% (07 Mar 2023 08:51) (100% - 100%)    Parameters below as of 07 Mar 2023 08:51  Patient On (Oxygen Delivery Method): room air        PHYSICAL EXAM:  Constitutional:  frail, chronically ill appearing   Eyes:JOHN, EOMI  Ear/Nose/Throat: no oral lesion, no sinus tenderness on percussion	  Neck  supple  Respiratory: CTA keerthi  Cardiovascular: S1S2 RRR, no murmurs  Gastrointestinal:soft, (+) BS, no HSM  Extremities:no e/e/c  Vascular: DP Pulse:	right normal; left normal      LABS:                        7.8    14.59 )-----------( 654      ( 07 Mar 2023 07:10 )             24.0     03-07    138  |  102  |  7   ----------------------------<  125<H>  3.6   |  28  |  0.43<L>    Ca    8.2<L>      07 Mar 2023 07:10  Phos  3.5     03-07  Mg     2.8     03-07    TPro  6.6  /  Alb  2.1<L>  /  TBili  0.4  /  DBili  0.2  /  AST  22  /  ALT  36  /  AlkPhos  239<H>  03-06    PT/INR - ( 07 Mar 2023 07:10 )   PT: 15.2 sec;   INR: 1.27          PTT - ( 07 Mar 2023 07:10 )  PTT:33.4 sec      MICROBIOLOGY:    Culture - Blood (02.25.23 @ 20:15)    Specimen Source: .Blood Blood    Culture Results:   No growth at 5 days.        RADIOLOGY & ADDITIONAL STUDIES:

## 2023-03-07 NOTE — PROGRESS NOTE ADULT - ASSESSMENT
78M with PMHx of IVDU found unresponsive at his nursing home, resolved after Narcan in the field and brought to [facility]. Found to have PE, atrial flutter, and large LV thrombus on ECHOand CTA showing mid SMA with embolus s/p Ex lap, SMA embolectomy, 80cm SBR, abthera vac left in discontinuity (12/11) and transferred to SICU intubated. S/p second look (12/13) and most recently s/p OR for 3rd look, end-to-end anastomosis of remaining bowel, loop ileostomy and abdomen closure (12/15). Prev stepped down to telemetry. LLE ischemia, AKA delayed by nutritional optimization. Transferred to SICU in the setting of sepsis for HD monitoring. 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 Pt w/ resolving tachycardia since OR and transient WBC, surgical site clean and intact w/o signs of infection.    - Local wound care- xeroform, wrap with kerlix and secure with ace wrap daily  - Remainder of care per primary team  - Vascular will continue to follow call [phone] with any questions or concerns

## 2023-03-07 NOTE — PRE-ANESTHESIA EVALUATION ADULT - NSANTHRISKNONERD_GEN_ALL_CORE
No risk alerts present
Risk Alerts:
No risk alerts present
Risk Alerts:

## 2023-03-07 NOTE — PRE-ANESTHESIA EVALUATION ADULT - NSPREOPDXFT_GEN_ALL_CORE
PVD
ischemic bowel
Bowel ischemia, sepsis
Mesenteric Ischemia
IVONE watkins
Aflutter
S/P ileostomy
Ischemic left leg

## 2023-03-07 NOTE — PROGRESS NOTE ADULT - ASSESSMENT
IMPRESSION:  79 yo male with prolonged course c/b mesenteric ischemia s/p SBR and development of RLQ abscess, LLE ischemia s/p L AKA 2/15; VRE faecium and ESBL Klebsiella BSI (enteric napoleon) likely 2/2 undrained intra-abdominal abscess.  Suspect continued signs of infection are due to undrained abscess    Recommend:  1.  Continue Daptomycin 500 mg IV daily  2.  Continue Ertapenem 1 gram IV daily  3.  Check repeat blood cultures today.  Concern for invasive candida infection given persistent fevers on broad spectrum antibiotics and given that patient is on TPN    ID team 2 will follow

## 2023-03-07 NOTE — PRE-ANESTHESIA EVALUATION ADULT - NSANTHDIETYNSD_GEN_ALL_CORE
Yes
Hydroxyzine Counseling: Patient advised that the medication is sedating and not to drive a car after taking this medication.  Patient informed of potential adverse effects including but not limited to dry mouth, urinary retention, and blurry vision.  The patient verbalized understanding of the proper use and possible adverse effects of hydroxyzine.  All of the patient's questions and concerns were addressed.

## 2023-03-07 NOTE — BRIEF OPERATIVE NOTE - NSICDXBRIEFPOSTOP_GEN_ALL_CORE_FT
POST-OP DIAGNOSIS:  Acute mesenteric ischemia 11-Dec-2022 06:47:31  Lashonda Hwang  
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  
POST-OP DIAGNOSIS:  Acute mesenteric ischemia 11-Dec-2022 06:47:31  Lashonda Hwang  
POST-OP DIAGNOSIS:  Acute mesenteric ischemia 11-Dec-2022 06:47:31  Lashonda Hwang  History of ileostomy 07-Mar-2023 16:35:42  Stephani Burrows

## 2023-03-07 NOTE — PRE-ANESTHESIA EVALUATION ADULT - NSANTHOSAYNRD_GEN_A_CORE
No. RACHEL screening performed.  STOP BANG Legend: 0-2 = LOW Risk; 3-4 = INTERMEDIATE Risk; 5-8 = HIGH Risk

## 2023-03-07 NOTE — PROGRESS NOTE ADULT - ASSESSMENT
77M PMHx of IVDU found unresponsive at his nursing home, resolved after Narcan in the field and brought to [facility]. Found to have PE, atrial flutter, and large LV thrombus on echo. Transferred to [facility] for further management. Pt c/o abdominal pain on 12/10 CTA showing mid SMA with embolus. Abnormal distal small bowel loops and cecum with dilatation and pneumatosis suggesting infarcted bowel. Now s/p Ex lap, SMA embolectomy, 80cm SBR, abthera vac left in discontinuity (12/11). S/p second look (12/13), s/p OR for 3rd look, end-to-end anastomosis of remaining bowel, loop ileostomy and abdomen closure (12/15). S/p LORENZO and Cardioversion on 12/30 with cardiology. Patient was nutritionally optimized. 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 s/p L AKA closure (3/1).     -OR on 3/7  -Regular diet with TPN via picc (2/20), NPO @ [facility]  -Replete high ostomy output q6hr  -Continue Immodium/Tincture of Opium.  -Undrainable abdominal fluid collection   -ID following: Erta( 2/13- ),  (Dapto 2/11- )    -pain/nausea control prn   -Continue metoprolol/amiodarone   -Holding spironolactone, entresto   -SCDs, SQL 65BID.   -Dispo: ROWAN

## 2023-03-07 NOTE — PROGRESS NOTE ADULT - ASSESSMENT
77M first presented to [facility] from [facility] due to unresponsiveness (responded to Narcan). At [facility], found to have subsegmental pulmonary embolism in RUL, rapid atrial flutter with severely reduced LVEF with LV thrombus. Transferred to [facility] on 12/7/22 for LORENZO and possible ablation. At [facility], was found to have left leg ischemia (left leg arterial thrombus on LE duplex on 12/8). Was being considered for rhythm control when he developed abdominal pain, CTA (12/10/22) showed embolus in SMA, bowel infarct, requiring ex-lap on 12/11 with SMA embolectomy, 80cm small bowel resection; On 12/13, underwent 2nd look laparotomy, with 80cm small bowel resection, closure with abthera. On 12/15, underwent 3rd look laparotomy, end-to-end anastomosis of remaining bowel, loop ileostomy and abdominal closure. Eventually underwent LORENZO/DCCV on 12/30/22, now in sinus rhythm. More recently found to have k. 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     #bacteremia - k pneumoniae, VRE facium suspected from un-drained abdominal abscess  #persistent fevers  - recent blood cultures from 3/1 without any growth, continue with daptomycin and ertapenem, send repeat b cx's as pt is high risk for opportunistic infections. Pending OR w washout today for better source control  - continue to monitor for fever - appreciate ongoing ID input  - return to OR today for ostomy reversal + washout for intraabd abscess    #s/p left AKA  - s/p OR with vascular for wound closure  - diuretic and ARB still on hold.  Still continues to have fevers     #anemia of chronic disease  - hg 7.9 after 1 prbc - ->8.4 --7.9  - no e/o active bleeding  - asymptomatic  - transfusion goal > 7     #Suicidal Ideation  #Insomnia   - Continue mirtazapine 30mg QHS   - psychiatry following, and appreciate any input    # HFrEF, not in exacerbation   - TTE from 2/11 reviewed, normal LVEF   - Metoprolol succinate 25mg qd - continue  - not volume overloaded.  cont to hold entresto and spironolactone.      # Atrial Flutter  - s/p DCCV 12/30  - EP recs - uninterrupted AC x 30 days ; January 29th was 30 days post DCCV.   -Continue full dose Lovenox, amiodarone and metoprolol succinate    #hyponatremia  -nl Na 138      -# Pulmonary embolism (subsegmental RUL)   - After completion of AC for DCCV, would need to continue AC for PE - currently primary team holding lovenox for procedure     # Bowel ischemia - s/p SMA embolectomy; Small bowel resection  - ileostomy in place, returning to OR for reversa/ 3/7  - plan of primary team  - continue loperamide and diphenoxylate atropine    # Severe protein-calorie malnutrition  # Sacral wound   - appreciate input from nutrition  - Wound care per nursing      #DVT ppx - continue lovenox for PE when appropriate to resume by primary team

## 2023-03-07 NOTE — BRIEF OPERATIVE NOTE - OPERATION/FINDINGS
Circumferential excision made around previous ileostomy. Circumferential careful dissection of ileostomy down to the fascia until peritoneal cavity entered. Right lower quadrant seroma drained, fluid sent for culture.  Proximal and distal limbs identified. End-to-end functional side to side anastomosis created via Loki technique using CHELY and TA staplers. Abdomen irrigated. Fascia closed with running PDS suture. Skin closed with running Monocryl in purse-string fashion.

## 2023-03-07 NOTE — BRIEF OPERATIVE NOTE - NSICDXBRIEFPROCEDURE_GEN_ALL_CORE_FT
PROCEDURES:  Small bowel resection with anastomosis 15-Dec-2022 11:11:07  Stephani Burrows  Closure of fascia of abdomen 15-Dec-2022 11:11:24  Stephani Burrows  Complex reversal of ileostomy 07-Mar-2023 16:34:28  Stephani Burrows  
PROCEDURES:  Amputation above knee 15-Feb-2023 19:36:44  Avinash Steel  
PROCEDURES:  Exploratory laparotomy with small bowel resection 11-Dec-2022 07:19:58  Margaret Nazario  
PROCEDURES:  Amputation above knee 15-Feb-2023 19:36:44  Avinash Steel  
PROCEDURES:  Embolectomy, artery, superior mesenteric 11-Dec-2022 06:47:09  Lashonda Hwang  
PROCEDURES:  Small bowel resection with anastomosis 15-Dec-2022 11:11:07  Stephani Burrows  Closure of fascia of abdomen 15-Dec-2022 11:11:24  Stephani Burrows  
PROCEDURES:  Exploratory laparotomy with small bowel resection 11-Dec-2022 07:19:58  Margaret Nazario

## 2023-03-07 NOTE — PROGRESS NOTE ADULT - SUBJECTIVE AND OBJECTIVE BOX
Team 4 Surgery Post-Op Note, PCN:     Procedure: ileostomy reversal  Surgeon: Eduardo Solomon    Subjective: Pt seen and examined at bedside 2 hours post-op. Pt is doing well, resting in bed. Pt denies abdominal pain, CP, SOB, nausea, vomiting.     Vital Signs Last 24 Hrs  T(C): 37.1 (07 Mar 2023 16:45), Max: 38.4 (07 Mar 2023 09:36)  T(F): 98.8 (07 Mar 2023 16:45), Max: 101.1 (07 Mar 2023 09:36)  HR: 102 (07 Mar 2023 16:45) (92 - 108)  BP: 113/70 (07 Mar 2023 16:45) (100/63 - 146/76)  BP(mean): 87 (07 Mar 2023 16:45) (77 - 103)  RR: 18 (07 Mar 2023 16:45) (13 - 18)  SpO2: 100% (07 Mar 2023 16:45) (100% - 100%)    Parameters below as of 07 Mar 2023 16:30  Patient On (Oxygen Delivery Method): room air        Physical Exam:  General: NAD, resting comfortably in bed  Pulmonary: Nonlabored breathing, no respiratory distress  Cardiovascular: sinus tach,   Abdominal: soft, NT/ND, dressing clean dry and intact  Extremities: s/p L AKA,        LABS:                        7.9    14.80 )-----------( 770      ( 07 Mar 2023 16:16 )             24.9     03-07    138  |  102  |  10  ----------------------------<  110<H>  4.4   |  28  |  0.44<L>    Ca    8.5      07 Mar 2023 16:16  Phos  4.0     03-07  Mg     1.8     03-07    TPro  6.6  /  Alb  2.1<L>  /  TBili  0.4  /  DBili  0.2  /  AST  22  /  ALT  36  /  AlkPhos  239<H>  03-06    PT/INR - ( 07 Mar 2023 16:16 )   PT: 14.8 sec;   INR: 1.24          PTT - ( 07 Mar 2023 16:16 )  PTT:32.9 sec  CAPILLARY BLOOD GLUCOSE          LIVER FUNCTIONS - ( 06 Mar 2023 05:30 )  Alb: 2.1 g/dL / Pro: 6.6 g/dL / ALK PHOS: 239 U/L / ALT: 36 U/L / AST: 22 U/L / GGT: x

## 2023-03-07 NOTE — PRE-ANESTHESIA EVALUATION ADULT - NSANTHINDVINFOSD_GEN_ALL_CORE
patient
2 physician consent
patient
2 MD consent, no relative available
patient
patient

## 2023-03-07 NOTE — PRE-ANESTHESIA EVALUATION ADULT - NSANTHPEFT_GEN_ALL_CORE
General: AAOx3, NAD  Eyes: The sclera and conjunctiva normal, pupils equal in size.  ENT: The ears and nose were normal in appearance; oropharynx clear, moist mucus membranes.  Neck: The appearance of the neck was normal, with no gross masses or nodules.  Respiratory: Unlabored, no retractions.  Neurological: No focal deficit, moves all extremities.
Frail elderly male in NAD
frail elderly male in NAD

## 2023-03-07 NOTE — BRIEF OPERATIVE NOTE - NSICDXBRIEFPREOP_GEN_ALL_CORE_FT
PRE-OP DIAGNOSIS:  History of ileostomy 07-Mar-2023 16:35:16  Stephani Burrows  
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  
PRE-OP DIAGNOSIS:  Acute mesenteric ischemia 11-Dec-2022 06:47:22  Lashonda Hwang  
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  
PRE-OP DIAGNOSIS:  Acute mesenteric ischemia 11-Dec-2022 06:47:22  Lashonda Hwang

## 2023-03-07 NOTE — PROGRESS NOTE ADULT - SUBJECTIVE AND OBJECTIVE BOX
SUBJECTIVE: Pt seen and examined by chief resident. Pt is doing well, resting comfortably on bed. No complaints at this time. Awaiting OR.     Vital Signs Last 24 Hrs  T(C): 38.3 (07 Mar 2023 04:32), Max: 38.4 (06 Mar 2023 09:15)  T(F): 100.9 (07 Mar 2023 04:32), Max: 101.1 (06 Mar 2023 09:15)  HR: 98 (07 Mar 2023 04:03) (96 - 112)  BP: 146/76 (07 Mar 2023 04:03) (117/61 - 146/76)  BP(mean): 103 (07 Mar 2023 04:03) (81 - 104)  RR: 15 (07 Mar 2023 04:03) (15 - 18)  SpO2: 100% (07 Mar 2023 04:03) (100% - 100%)    Parameters below as of 07 Mar 2023 04:03  Patient On (Oxygen Delivery Method): room air        I&O's Summary    06 Mar 2023 07:01  -  07 Mar 2023 07:00  --------------------------------------------------------  IN: 3695.6 mL / OUT: 4745 mL / NET: -1049.4 mL        Physical Exam:  General Appearance: Appears well, NAD  Pulmonary: Nonlabored breathing, no respiratory distress  Abd: soft, nontender, nondistended. Ileostomy high output light brown  Extrem: s/p L AKA, dressing in place recently changed     LABS:                        8.4    13.74 )-----------( 675      ( 06 Mar 2023 05:30 )             27.0     03-06    138  |  103  |  9   ----------------------------<  118<H>  3.4<L>   |  24  |  0.43<L>    Ca    8.4      06 Mar 2023 05:30  Phos  3.1     03-06  Mg     2.0     03-06    TPro  6.6  /  Alb  2.1<L>  /  TBili  0.4  /  DBili  0.2  /  AST  22  /  ALT  36  /  AlkPhos  239<H>  03-06

## 2023-03-07 NOTE — PRE-ANESTHESIA EVALUATION ADULT - NSDENTALSD_ENT_ALL_CORE
missing teeth
edentulous/missing teeth
caps/bridge/implants
caps/bridge/implants
appears normal and intact
missing teeth

## 2023-03-08 NOTE — BH CONSULTATION LIAISON PROGRESS NOTE - NSBHMSEHYG_PSY_A_CORE
Fair
Poor
Fair
Fair
Poor
Fair
Poor
Fair

## 2023-03-08 NOTE — BH CONSULTATION LIAISON PROGRESS NOTE - NSBHMSEATTEN_PSY_A_CORE
Normal
Impaired
Normal
Impaired
Normal
Other
Normal
Other

## 2023-03-08 NOTE — BH CONSULTATION LIAISON PROGRESS NOTE - NSBHMSEPERCEPT_PSY_A_CORE
No abnormalities

## 2023-03-08 NOTE — PROGRESS NOTE ADULT - SUBJECTIVE AND OBJECTIVE BOX
INTERVAL HPI/OVERNIGHT EVENTS:    Patient was seen and examined at bedside.  Afebrile     Patient went to the OR yesterday for ileostomy reversal. Right lower quadrant fluid collection was drainage     CONSTITUTIONAL:  Negative fever or chills, feels well, good appetite  EYES:  Negative  blurry vision or double vision  CARDIOVASCULAR:  Negative for chest pain or palpitations  RESPIRATORY:  Negative for cough, wheezing, or SOB   GASTROINTESTINAL:  Negative for nausea, vomiting, diarrhea, constipation, or abdominal pain  GENITOURINARY:  Negative frequency, urgency or dysuria  NEUROLOGIC:  No headache, confusion, dizziness, lightheadedness      ANTIBIOTICS/RELEVANT:    MEDICATIONS  (STANDING):  aMIOdarone    Tablet 100 milliGRAM(s) Oral every 24 hours  ascorbic acid 500 milliGRAM(s) Oral daily  chlorhexidine 2% Cloths 1 Application(s) Topical daily  cholestyramine Powder (Sugar-Free) 2 Gram(s) Oral three times a day  DAPTOmycin IVPB 500 milliGRAM(s) IV Intermittent every 24 hours  ertapenem  IVPB 1000 milliGRAM(s) IV Intermittent every 24 hours  fat emulsion (Fish Oil and Plant Based) 20% Infusion 0.7764 Gm/kG/Day (20.8 mL/Hr) IV Continuous <Continuous>  heparin   Injectable 5000 Unit(s) SubCutaneous every 8 hours  influenza  Vaccine (HIGH DOSE) 0.7 milliLiter(s) IntraMuscular once  metoprolol succinate ER 25 milliGRAM(s) Oral daily  mirtazapine 30 milliGRAM(s) Oral at bedtime  multivitamin 1 Tablet(s) Oral daily  nystatin Powder 1 Application(s) Topical two times a day  pantoprazole  Injectable 40 milliGRAM(s) IV Push daily  Parenteral Nutrition - Adult 1 Each (83 mL/Hr) TPN Continuous <Continuous>  Parenteral Nutrition - Adult 1 Each (83 mL/Hr) TPN Continuous <Continuous>  povidone iodine 10% Solution 1 Application(s) Topical daily  sodium chloride 0.9% lock flush 10 milliLiter(s) IV Push every 8 hours  zinc sulfate 220 milliGRAM(s) Oral daily    MEDICATIONS  (PRN):  acetaminophen     Tablet .. 650 milliGRAM(s) Oral every 6 hours PRN Temp greater or equal to 38C (100.4F), Mild Pain (1 - 3)  HYDROmorphone  Injectable 0.5 milliGRAM(s) IV Push every 4 hours PRN breakthrough pain  melatonin 3 milliGRAM(s) Oral at bedtime PRN Insomnia  ondansetron Injectable 4 milliGRAM(s) IV Push every 6 hours PRN Nausea and/or Vomiting  oxyCODONE    IR 10 milliGRAM(s) Oral every 4 hours PRN Severe Pain (7 - 10)  oxyCODONE    IR 5 milliGRAM(s) Oral every 4 hours PRN Moderate Pain (4 - 6)  sodium chloride 0.9% lock flush 10 milliLiter(s) IV Push every 1 hour PRN Pre/post blood products, medications, blood draw, and to maintain line patency        Vital Signs Last 24 Hrs  T(C): 36.8 (08 Mar 2023 14:26), Max: 37.6 (07 Mar 2023 20:29)  T(F): 98.3 (08 Mar 2023 14:26), Max: 99.6 (07 Mar 2023 20:29)  HR: 112 (08 Mar 2023 12:10) (92 - 112)  BP: 121/79 (08 Mar 2023 12:10) (100/63 - 142/78)  BP(mean): 96 (08 Mar 2023 12:10) (77 - 102)  RR: 18 (08 Mar 2023 12:10) (10 - 18)  SpO2: 100% (08 Mar 2023 12:10) (100% - 100%)    Parameters below as of 08 Mar 2023 12:10  Patient On (Oxygen Delivery Method): room air        PHYSICAL EXAM:  Constitutional:  chronically ill appearing, frail   Eyes:JOHN, EOMI  Ear/Nose/Throat: no oral lesion, no sinus tenderness on percussion	  Neck:  supple  Respiratory: CTA keerthi  Cardiovascular: S1S2 RRR, no murmurs  Gastrointestinal:soft, (+) BS, no HSM  Extremities:no e/e/c  Vascular: DP Pulse:	right normal; left normal      LABS:                        8.9    14.39 )-----------( 533      ( 08 Mar 2023 05:30 )             28.3     03-08    139  |  101  |  13  ----------------------------<  135<H>  4.7   |  29  |  0.43<L>    Ca    8.6      08 Mar 2023 05:30  Phos  4.4     03-08  Mg     2.1     03-08      PT/INR - ( 07 Mar 2023 16:16 )   PT: 14.8 sec;   INR: 1.24          PTT - ( 07 Mar 2023 16:16 )  PTT:32.9 sec      MICROBIOLOGY:    Culture - Body Fluid with Gram Stain (03.07.23 @ 14:17)    Gram Stain:   No organisms seen  No WBC's seen.    Specimen Source: .Body Fluid Intraabdominal Fluid Collection    Culture Results:   Rare Klebsiella pneumoniae  Susceptibility to follow.        RADIOLOGY & ADDITIONAL STUDIES:

## 2023-03-08 NOTE — BH CONSULTATION LIAISON PROGRESS NOTE - NSBHATTESTTYPEVISIT_PSY_A_CORE
Attending Only
Attending with Resident/Fellow/Student
Resident/Fellow with telephonic supervision
Attending with Resident/Fellow/Student
Attending Only
Attending with Resident/Fellow/Student

## 2023-03-08 NOTE — BH CONSULTATION LIAISON PROGRESS NOTE - NSBHFUPINTERVALHXFT_PSY_A_CORE
The writer met with the patient for follow-up assessment and psychotherapy. The patient was lying in bed with his eyes closed upon approach, though was easily aroused. The patient was mostly quiet, speaking some about the physical challenges he is experiencing. Calm throughout. Mood appears stable. No suicidal or homicidal ideation, plan, intent, behavior reported. No evidence of tyler or psychosis.

## 2023-03-08 NOTE — BH CONSULTATION LIAISON PROGRESS NOTE - LEVEL OF CONSCIOUSNESS
Lethargic, arousable to verbal stimulus
Alert
Lethargic, arousable to verbal stimulus
Lethargic, arousable to verbal stimulus
Alert
Lethargic, arousable to verbal stimulus
Alert

## 2023-03-08 NOTE — BH CONSULTATION LIAISON PROGRESS NOTE - NSBHMSEINSIGHT_PSY_A_CORE
Fair
Poor
Fair
Poor
Fair
Good
Fair
Good
Fair
Fair

## 2023-03-08 NOTE — PROVIDER CONTACT NOTE (OTHER) - SITUATION
Pt HR up to 140, temp 100.9, Pt complain of abd pain and n&v, MD Santana Harmon at the bedside, IV Dilaudid given, Zofran given, IV Tylenol given, abd X-RAY ordered.

## 2023-03-08 NOTE — BH CONSULTATION LIAISON PROGRESS NOTE - NSBHATTESTTYPESTAFF_PSY_A_CORE
Student
Fellow
Student
Resident
Fellow

## 2023-03-08 NOTE — PROGRESS NOTE ADULT - SUBJECTIVE AND OBJECTIVE BOX
STATUS POST:  ileostomy closure      POST OPERATIVE DAY #: 1    SUBJECTIVE: Pt seen and examined by chief resident. Pt is doing well, resting comfortably on bed. Pain controlled. -F/-BM. No complaints at this time.    Vital Signs Last 24 Hrs  T(C): 36.8 (08 Mar 2023 04:45), Max: 38.4 (07 Mar 2023 09:36)  T(F): 98.3 (08 Mar 2023 04:45), Max: 101.1 (07 Mar 2023 09:36)  HR: 92 (08 Mar 2023 04:02) (92 - 108)  BP: 113/71 (08 Mar 2023 04:02) (100/63 - 136/75)  BP(mean): 87 (08 Mar 2023 04:02) (77 - 99)  RR: 18 (08 Mar 2023 04:02) (10 - 18)  SpO2: 100% (08 Mar 2023 04:02) (100% - 100%)    Parameters below as of 08 Mar 2023 04:02  Patient On (Oxygen Delivery Method): room air        I&O's Summary    06 Mar 2023 07:01  -  07 Mar 2023 07:00  --------------------------------------------------------  IN: 3695.6 mL / OUT: 4745 mL / NET: -1049.4 mL    07 Mar 2023 07:01  -  08 Mar 2023 06:59  --------------------------------------------------------  IN: 1594 mL / OUT: 2250 mL / NET: -656 mL        Physical Exam:  General Appearance: Appears well, NAD  Pulmonary: Nonlabored breathing, no respiratory distress  Cardiovascular: NSR  Abdomen: Soft, nondisteded, nontender, packing in prior ileostomy site in place  Extremities: WWP, SCD's in place     LABS:                        7.9    14.80 )-----------( 770      ( 07 Mar 2023 16:16 )             24.9     03-07    138  |  102  |  10  ----------------------------<  110<H>  4.4   |  28  |  0.44<L>    Ca    8.5      07 Mar 2023 16:16  Phos  4.0     03-07  Mg     1.8     03-07      PT/INR - ( 07 Mar 2023 16:16 )   PT: 14.8 sec;   INR: 1.24          PTT - ( 07 Mar 2023 16:16 )  PTT:32.9 sec

## 2023-03-08 NOTE — BH CONSULTATION LIAISON PROGRESS NOTE - NSBHMSEMUSCLE_PSY_A_CORE
Unable to assess
Normal muscle tone/strength
Abnormal muscle tone/strength
Unable to assess
Normal muscle tone/strength
Unable to assess
Normal muscle tone/strength

## 2023-03-08 NOTE — BH CONSULTATION LIAISON PROGRESS NOTE - NSBHMSEAFFRANGE_PSY_A_CORE
Full
Constricted
Full
Blunted
Constricted
Full
Constricted
Blunted
Constricted
Full
Full
Constricted

## 2023-03-08 NOTE — BH CONSULTATION LIAISON PROGRESS NOTE - NSBHMSEAFFQUAL_PSY_A_CORE
Depressed
Depressed/Irritable
Irritable
Depressed
Depressed/Irritable
Depressed
Depressed
Irritable
Depressed
Depressed
Depressed/Irritable
Depressed
Depressed/Other

## 2023-03-08 NOTE — ADVANCED PRACTICE NURSE CONSULT - RECOMMEDATIONS
Will continue to follow
Will continue to follow.
Lower spine and sacral pressure injuries - cleanse with normal saline, apply Triad cream, cover with foam dressing every other day and prn if soiled.   Continue turning and repositioning the patient and offloading heels.   WOCN discussed assessment and recommendations withSalena Coats and surgery staff, Dr Banegas. 
Recommend continuing Triad ointment to sacral wound, Nystatin powder to fungal rash. Spoke with JOHN Gracia and house staff
Will continue to follow
Will continue to follow
Will continue to follow.

## 2023-03-08 NOTE — BH CONSULTATION LIAISON PROGRESS NOTE - NSBHCONSULTFOLLOWAFTERCARE_PSY_A_CORE FT
- 
- pending clinical course, recommend continued psychiatric f/u as outpt
To be discussed at discharge
- pending clinical course, recommend continued psychiatric f/u as outpt
- pending clinical course, recommend continued psychiatric f/u as outpt
- 
- pending clinical course, recommend continued psychiatric f/u as outpt
to be discussed at discharge
-

## 2023-03-08 NOTE — BH CONSULTATION LIAISON PROGRESS NOTE - NSBHMSEINTELL_PSY_A_CORE
Average

## 2023-03-08 NOTE — PROVIDER CONTACT NOTE (OTHER) - ACTION/TREATMENT ORDERED:
IV dilaudid given, Zofran given, IV Tylenol given, abd X-RAY ordered. no other interventions, will continue monitor.

## 2023-03-08 NOTE — BH CONSULTATION LIAISON PROGRESS NOTE - NSBHFUPREASONCONS_PSY_A_CORE
depression
depression
suicidality/depression
suicidality
suicidality/depression
depression/sleep disturbance
suicidality/depression
suicidality
suicidality
suicidality/agitation
suicidality/depression
suicidality/depression
depression
suicidality
suicidality/depression
suicidality/depression
depression

## 2023-03-08 NOTE — BH CONSULTATION LIAISON PROGRESS NOTE - NSBHMSEKNOWHOW_PSY_ALL_CORE
Current Events/Vocabulary
Current Events/Vocabulary
Current Events/Vocabulary/Other...
Vocabulary
Current Events/Vocabulary
Vocabulary
Vocabulary
Current Events/Vocabulary
Vocabulary
Current Events/Vocabulary
Vocabulary
Vocabulary
Current Events/Vocabulary
Current Events/Vocabulary
Vocabulary

## 2023-03-08 NOTE — BH CONSULTATION LIAISON PROGRESS NOTE - NSBHPTASSESSDT_PSY_A_CORE
26-Jan-2023 19:29
27-Jan-2023 11:43
17-Jan-2023 13:18
16-Jan-2023 13:26
13-Jan-2023 11:44
30-Jan-2023 12:00
01-Feb-2023 10:17
12-Jan-2023 15:38
08-Mar-2023 12:46
09-Feb-2023 13:03
23-Feb-2023 12:15
14-Feb-2023 11:30
20-Jan-2023 12:30
10-Feb-2023 11:32
31-Jan-2023 09:15
06-Feb-2023 15:20
07-Feb-2023 12:30
13-Feb-2023 14:01
25-Jan-2023 15:23
19-Jan-2023 09:20
02-Feb-2023 11:02

## 2023-03-08 NOTE — BH CONSULTATION LIAISON PROGRESS NOTE - NSBHATTESTBILLING_PSY_A_CORE
16503-Vegxkytkku OBS or IP - low complexity OR 25-34 mins
57961-Piumfcijqa OBS or IP - low complexity OR 25-34 mins
24475-Wvqirzmhle OBS or IP - low complexity OR 25-34 mins
35121-Tdjobpnwlc OBS or IP - high complexity OR 50-79 mins
Non-billable
55490-Wbypdxqiyf OBS or IP - high complexity OR 50-79 mins
60119-Aopthodvpv OBS or IP - high complexity OR 50-79 mins
10807-Tnfgdnywir OBS or IP - high complexity OR 50-79 mins
39868-Zgijfagytz OBS or IP - high complexity OR 50-79 mins
17253-Exlrsbgwid OBS or IP - high complexity OR 50-79 mins
Non-billable
60227-Rjxiytqcfh OBS or IP - low complexity OR 25-34 mins
76478-Hepmerqzxf OBS or IP - high complexity OR 50-79 mins
28990-Nrgkoymenj OBS or IP - low complexity OR 25-34 mins
43447-Ksmisdqgcg OBS or IP - high complexity OR 50-79 mins
54764-Cvbggquqkn OBS or IP - high complexity OR 50-79 mins
71380-Lqgnsrlrnb OBS or IP - low complexity OR 25-34 mins
56004-Wnstohdwia OBS or IP - moderate complexity OR 35-49 mins

## 2023-03-08 NOTE — PROGRESS NOTE ADULT - SUBJECTIVE AND OBJECTIVE BOX
Patient is a 78y old  Male who presents with a chief complaint of A flutter (08 Mar 2023 06:59)      INTERVAL HPI/OVERNIGHT EVENTS: Afebrile overnight     MEDICATIONS  (STANDING):  aMIOdarone    Tablet 100 milliGRAM(s) Oral every 24 hours  ascorbic acid 500 milliGRAM(s) Oral daily  chlorhexidine 2% Cloths 1 Application(s) Topical daily  cholestyramine Powder (Sugar-Free) 2 Gram(s) Oral three times a day  DAPTOmycin IVPB 500 milliGRAM(s) IV Intermittent every 24 hours  ertapenem  IVPB 1000 milliGRAM(s) IV Intermittent every 24 hours  fat emulsion (Fish Oil and Plant Based) 20% Infusion 0.7764 Gm/kG/Day (20.8 mL/Hr) IV Continuous <Continuous>  heparin   Injectable 5000 Unit(s) SubCutaneous every 8 hours  influenza  Vaccine (HIGH DOSE) 0.7 milliLiter(s) IntraMuscular once  metoprolol succinate ER 25 milliGRAM(s) Oral daily  mirtazapine 30 milliGRAM(s) Oral at bedtime  multivitamin 1 Tablet(s) Oral daily  nystatin Powder 1 Application(s) Topical two times a day  pantoprazole  Injectable 40 milliGRAM(s) IV Push daily  Parenteral Nutrition - Adult 1 Each (83 mL/Hr) TPN Continuous <Continuous>  Parenteral Nutrition - Adult 1 Each (83 mL/Hr) TPN Continuous <Continuous>  povidone iodine 10% Solution 1 Application(s) Topical daily  sodium chloride 0.9% lock flush 10 milliLiter(s) IV Push every 8 hours  zinc sulfate 220 milliGRAM(s) Oral daily    MEDICATIONS  (PRN):  acetaminophen     Tablet .. 650 milliGRAM(s) Oral every 6 hours PRN Temp greater or equal to 38C (100.4F), Mild Pain (1 - 3)  HYDROmorphone  Injectable 0.5 milliGRAM(s) IV Push every 4 hours PRN breakthrough pain  melatonin 3 milliGRAM(s) Oral at bedtime PRN Insomnia  ondansetron Injectable 4 milliGRAM(s) IV Push every 6 hours PRN Nausea and/or Vomiting  oxyCODONE    IR 10 milliGRAM(s) Oral every 4 hours PRN Severe Pain (7 - 10)  oxyCODONE    IR 5 milliGRAM(s) Oral every 4 hours PRN Moderate Pain (4 - 6)  sodium chloride 0.9% lock flush 10 milliLiter(s) IV Push every 1 hour PRN Pre/post blood products, medications, blood draw, and to maintain line patency      __________________________________________________  REVIEW OF SYSTEMS:    CONSTITUTIONAL: No fever,   EYES: no acute visual disturbances  NECK: No pain or stiffness  RESPIRATORY: No cough; No shortness of breath  CARDIOVASCULAR: No chest pain, no palpitations  GASTROINTESTINAL: + abdominal pain   NEUROLOGICAL: No headache or numbness, no tremors  MUSCULOSKELETAL: RK joint pain, no muscle pain  GENITOURINARY: no dysuria, no frequency, no hesitancy  PSYCHIATRY: no depression , no anxiety  ALL OTHER  ROS negative        Vital Signs Last 24 Hrs  T(C): 37 (08 Mar 2023 09:38), Max: 37.6 (07 Mar 2023 20:29)  T(F): 98.6 (08 Mar 2023 09:38), Max: 99.6 (07 Mar 2023 20:29)  HR: 112 (08 Mar 2023 12:10) (92 - 112)  BP: 121/79 (08 Mar 2023 12:10) (100/63 - 142/78)  BP(mean): 96 (08 Mar 2023 12:10) (77 - 102)  RR: 18 (08 Mar 2023 12:10) (10 - 18)  SpO2: 100% (08 Mar 2023 12:10) (100% - 100%)    Parameters below as of 08 Mar 2023 12:10  Patient On (Oxygen Delivery Method): room air        ________________________________________________  PHYSICAL EXAM:  GENERAL: NAD  HEENT: Normocephalic;  conjunctivae and sclerae clear; very dry mucous membranes;   NECK : supple  CHEST/LUNG: Clear to auscultation bilaterally with good air entry   HEART: S1 S2  regular; no murmurs, gallops or rubs  ABDOMEN: Soft, TTP, Nondistended; Bowel sounds present  EXTREMITIES: no cyanosis; no edema; no calf tenderness, L AKA   SKIN: un stageable sacral ulcer, RLE with big toe noted to have healing ulceration   NERVOUS SYSTEM:  Awake and alert; Oriented  to place, person; no new deficits      _________________________________________________  LABS:                        8.9    14.39 )-----------( 533      ( 08 Mar 2023 05:30 )             28.3     03-08    139  |  101  |  13  ----------------------------<  135<H>  4.7   |  29  |  0.43<L>    Ca    8.6      08 Mar 2023 05:30  Phos  4.4     03-08  Mg     2.1     03-08      PT/INR - ( 07 Mar 2023 16:16 )   PT: 14.8 sec;   INR: 1.24          PTT - ( 07 Mar 2023 16:16 )  PTT:32.9 sec    CAPILLARY BLOOD GLUCOSE            RADIOLOGY & ADDITIONAL TESTS:      Plan of care was discussed with patient and /or primary care giver; all questions and concerns were addressed and care was aligned with patient's wishes.

## 2023-03-08 NOTE — BH CONSULTATION LIAISON PROGRESS NOTE - NSBHCHARTREVIEWVS_PSY_A_CORE FT
Vital Signs Last 24 Hrs  T(C): 37.3 (17 Jan 2023 11:20), Max: 38.8 (17 Jan 2023 08:50)  T(F): 99.1 (17 Jan 2023 11:20), Max: 101.8 (17 Jan 2023 08:50)  HR: 104 (17 Jan 2023 08:50) (98 - 104)  BP: 142/75 (17 Jan 2023 08:50) (123/63 - 158/77)  BP(mean): 98 (17 Jan 2023 08:50) (87 - 106)  RR: 16 (17 Jan 2023 08:50) (16 - 20)  SpO2: 100% (17 Jan 2023 08:50) (97% - 100%)    Parameters below as of 17 Jan 2023 08:50  Patient On (Oxygen Delivery Method): room air    
Vital Signs Last 24 Hrs  T(C): 38.1 (01 Feb 2023 09:06), Max: 38.7 (31 Jan 2023 21:51)  T(F): 100.5 (01 Feb 2023 09:06), Max: 101.6 (31 Jan 2023 21:51)  HR: 112 (01 Feb 2023 08:21) (100 - 112)  BP: 120/62 (01 Feb 2023 08:21) (96/57 - 156/77)  BP(mean): 81 (01 Feb 2023 08:21) (72 - 110)  RR: 18 (01 Feb 2023 08:21) (17 - 18)  SpO2: 95% (01 Feb 2023 08:21) (95% - 100%)    Parameters below as of 01 Feb 2023 08:21  Patient On (Oxygen Delivery Method): room air    
Vital Signs Last 24 Hrs  T(C): 37.1 (13 Jan 2023 09:01), Max: 37.5 (12 Jan 2023 22:00)  T(F): 98.7 (13 Jan 2023 09:01), Max: 99.5 (12 Jan 2023 22:00)  HR: 100 (13 Jan 2023 08:13) (90 - 100)  BP: 112/58 (13 Jan 2023 08:13) (111/77 - 132/75)  BP(mean): 75 (13 Jan 2023 08:13) (75 - 96)  RR: 18 (13 Jan 2023 08:13) (18 - 18)  SpO2: 100% (13 Jan 2023 08:13) (99% - 100%)    Parameters below as of 13 Jan 2023 08:13  Patient On (Oxygen Delivery Method): room air    
Vital Signs Last 24 Hrs  T(C): 36.8 (14 Feb 2023 11:40), Max: 38.1 (13 Feb 2023 19:46)  T(F): 98.2 (14 Feb 2023 11:40), Max: 100.5 (13 Feb 2023 19:46)  HR: 92 (14 Feb 2023 13:00) (82 - 124)  BP: 112/55 (14 Feb 2023 13:00) (111/59 - 184/77)  BP(mean): 78 (14 Feb 2023 13:00) (78 - 116)  RR: 26 (14 Feb 2023 13:00) (9 - 38)  SpO2: 100% (14 Feb 2023 13:00) (91% - 100%)    Parameters below as of 14 Feb 2023 13:00  Patient On (Oxygen Delivery Method): room air    
Vital Signs Last 24 Hrs  T(C): 36.8 (27 Jan 2023 09:41), Max: 37.2 (26 Jan 2023 18:05)  T(F): 98.2 (27 Jan 2023 09:41), Max: 98.9 (26 Jan 2023 18:05)  HR: 108 (27 Jan 2023 09:00) (94 - 126)  BP: 123/62 (27 Jan 2023 09:00) (64/42 - 165/92)  BP(mean): 83 (27 Jan 2023 09:00) (49 - 116)  RR: 14 (27 Jan 2023 09:00) (14 - 18)  SpO2: 98% (27 Jan 2023 09:00) (95% - 100%)    Parameters below as of 27 Jan 2023 09:00  Patient On (Oxygen Delivery Method): room air    
Vital Signs Last 24 Hrs  T(C): 38.4 (19 Jan 2023 09:17), Max: 38.4 (19 Jan 2023 09:17)  T(F): 101.1 (19 Jan 2023 09:17), Max: 101.1 (19 Jan 2023 09:17)  HR: 96 (19 Jan 2023 08:27) (96 - 106)  BP: 135/78 (19 Jan 2023 08:27) (127/72 - 141/72)  BP(mean): 99 (19 Jan 2023 08:27) (87 - 99)  RR: 18 (19 Jan 2023 08:27) (17 - 18)  SpO2: 99% (19 Jan 2023 08:27) (96% - 100%)    Parameters below as of 19 Jan 2023 08:27  Patient On (Oxygen Delivery Method): room air    
Vital Signs Last 24 Hrs  T(C): 38 (25 Jan 2023 14:15), Max: 38.2 (24 Jan 2023 21:30)  T(F): 100.4 (25 Jan 2023 14:15), Max: 100.8 (24 Jan 2023 21:30)  HR: 108 (25 Jan 2023 11:29) (94 - 110)  BP: 127/79 (25 Jan 2023 11:29) (115/62 - 134/67)  BP(mean): 96 (25 Jan 2023 11:29) (76 - 96)  RR: 18 (25 Jan 2023 11:28) (16 - 18)  SpO2: 98% (25 Jan 2023 11:28) (98% - 99%)    Parameters below as of 25 Jan 2023 11:28  Patient On (Oxygen Delivery Method): room air    
Vital Signs Last 24 Hrs  T(C): 37.2 (12 Jan 2023 13:38), Max: 37.4 (12 Jan 2023 04:43)  T(F): 99 (12 Jan 2023 13:38), Max: 99.4 (12 Jan 2023 04:43)  HR: 94 (12 Jan 2023 11:28) (90 - 98)  BP: 123/70 (12 Jan 2023 11:28) (115/60 - 140/80)  BP(mean): 93 (12 Jan 2023 11:28) (78 - 103)  RR: 18 (12 Jan 2023 11:28) (17 - 20)  SpO2: 99% (12 Jan 2023 11:28) (97% - 100%)    Parameters below as of 12 Jan 2023 11:28  Patient On (Oxygen Delivery Method): room air    
Vital Signs Last 24 Hrs  T(C): 37.3 (07 Feb 2023 14:00), Max: 38.3 (06 Feb 2023 15:26)  T(F): 99.2 (07 Feb 2023 14:00), Max: 101 (06 Feb 2023 15:26)  HR: 98 (07 Feb 2023 12:00) (90 - 120)  BP: 132/58 (07 Feb 2023 12:00) (100/55 - 132/58)  BP(mean): 84 (07 Feb 2023 12:00) (69 - 84)  RR: 18 (07 Feb 2023 12:00) (15 - 18)  SpO2: 100% (07 Feb 2023 12:00) (95% - 100%)    Parameters below as of 07 Feb 2023 12:00  Patient On (Oxygen Delivery Method): room air    
Vital Signs Last 24 Hrs  T(C): 37.8 (06 Feb 2023 16:52), Max: 38.3 (06 Feb 2023 15:26)  T(F): 100 (06 Feb 2023 16:52), Max: 101 (06 Feb 2023 15:26)  HR: 120 (06 Feb 2023 16:20) (98 - 120)  BP: 126/58 (06 Feb 2023 16:10) (95/54 - 126/58)  BP(mean): 83 (06 Feb 2023 16:10) (67 - 83)  RR: 18 (06 Feb 2023 16:10) (17 - 18)  SpO2: 100% (06 Feb 2023 16:20) (60% - 100%)    Parameters below as of 06 Feb 2023 16:10  Patient On (Oxygen Delivery Method): room air    
Vital Signs Last 24 Hrs  T(C): 36.7 (09 Feb 2023 09:21), Max: 38.1 (08 Feb 2023 22:06)  T(F): 98 (09 Feb 2023 09:21), Max: 100.5 (08 Feb 2023 22:06)  HR: 96 (09 Feb 2023 09:58) (96 - 124)  BP: 100/72 (09 Feb 2023 09:58) (100/72 - 122/56)  BP(mean): 81 (09 Feb 2023 09:58) (76 - 85)  RR: 18 (09 Feb 2023 08:40) (17 - 18)  SpO2: 97% (09 Feb 2023 09:58) (94% - 100%)    Parameters below as of 09 Feb 2023 09:58  Patient On (Oxygen Delivery Method): room air    
Vital Signs Last 24 Hrs  T(C): 37.5 (10 Feb 2023 09:04), Max: 38.1 (09 Feb 2023 22:01)  T(F): 99.5 (10 Feb 2023 09:04), Max: 100.5 (09 Feb 2023 22:01)  HR: 104 (10 Feb 2023 08:10) (102 - 122)  BP: 113/64 (10 Feb 2023 08:10) (90/54 - 113/64)  BP(mean): 81 (10 Feb 2023 08:10) (65 - 81)  RR: 17 (10 Feb 2023 08:10) (17 - 18)  SpO2: 98% (10 Feb 2023 08:10) (98% - 100%)    Parameters below as of 10 Feb 2023 08:10  Patient On (Oxygen Delivery Method): room air    
Vital Signs Last 24 Hrs  T(C): 37.4 (31 Jan 2023 09:10), Max: 38.2 (31 Jan 2023 04:32)  T(F): 99.3 (31 Jan 2023 09:10), Max: 100.7 (31 Jan 2023 04:32)  HR: 106 (31 Jan 2023 12:04) (100 - 126)  BP: 143/63 (31 Jan 2023 12:04) (96/57 - 156/77)  BP(mean): 91 (31 Jan 2023 12:04) (72 - 110)  RR: 18 (31 Jan 2023 11:27) (17 - 18)  SpO2: 100% (31 Jan 2023 12:04) (94% - 100%)    Parameters below as of 31 Jan 2023 12:04  Patient On (Oxygen Delivery Method): room air    
Vital Signs Last 24 Hrs  T(C): 37 (16 Jan 2023 09:09), Max: 38.1 (15 Ladarius 2023 16:00)  T(F): 98.6 (16 Jan 2023 09:09), Max: 100.6 (15 Ladarius 2023 16:00)  HR: 96 (16 Jan 2023 12:00) (88 - 104)  BP: 142/83 (16 Jan 2023 12:00) (118/72 - 142/83)  BP(mean): 91 (16 Jan 2023 08:14) (91 - 95)  RR: 16 (16 Jan 2023 12:00) (16 - 22)  SpO2: 100% (16 Jan 2023 12:00) (96% - 100%)    Parameters below as of 16 Jan 2023 03:45  Patient On (Oxygen Delivery Method): room air    
Vital Signs Last 24 Hrs  T(C): 37 (08 Mar 2023 09:38), Max: 37.6 (07 Mar 2023 20:29)  T(F): 98.6 (08 Mar 2023 09:38), Max: 99.6 (07 Mar 2023 20:29)  HR: 112 (08 Mar 2023 12:10) (92 - 112)  BP: 121/79 (08 Mar 2023 12:10) (100/63 - 142/78)  BP(mean): 96 (08 Mar 2023 12:10) (77 - 102)  RR: 18 (08 Mar 2023 12:10) (10 - 18)  SpO2: 100% (08 Mar 2023 12:10) (100% - 100%)    Parameters below as of 08 Mar 2023 12:10  Patient On (Oxygen Delivery Method): room air    
Vital Signs Last 24 Hrs  T(C): 37.2 (13 Feb 2023 11:00), Max: 38.5 (12 Feb 2023 21:00)  T(F): 98.9 (13 Feb 2023 11:00), Max: 101.3 (12 Feb 2023 21:00)  HR: 99 (13 Feb 2023 14:00) (84 - 110)  BP: 154/88 (13 Feb 2023 14:00) (81/52 - 155/71)  BP(mean): 116 (13 Feb 2023 14:00) (61 - 116)  RR: 24 (13 Feb 2023 14:00) (10 - 36)  SpO2: 95% (13 Feb 2023 14:00) (85% - 100%)    Parameters below as of 13 Feb 2023 14:00  Patient On (Oxygen Delivery Method): room air    
Vital Signs Last 24 Hrs  T(C): 37.6 (20 Jan 2023 09:28), Max: 38.9 (19 Jan 2023 22:00)  T(F): 99.6 (20 Jan 2023 09:28), Max: 102.1 (19 Jan 2023 22:00)  HR: 104 (20 Jan 2023 08:10) (80 - 110)  BP: 141/80 (20 Jan 2023 08:10) (122/63 - 163/74)  BP(mean): 102 (20 Jan 2023 08:10) (86 - 106)  RR: 18 (20 Jan 2023 08:10) (18 - 18)  SpO2: 98% (20 Jan 2023 08:10) (98% - 99%)    Parameters below as of 20 Jan 2023 08:10  Patient On (Oxygen Delivery Method): room air    
Vital Signs Last 24 Hrs  T(C): 37.2 (02 Feb 2023 09:15), Max: 38.1 (01 Feb 2023 22:05)  T(F): 99 (02 Feb 2023 09:15), Max: 100.6 (01 Feb 2023 22:05)  HR: 108 (02 Feb 2023 08:39) (104 - 114)  BP: 101/60 (02 Feb 2023 08:39) (84/51 - 110/65)  BP(mean): 74 (02 Feb 2023 08:39) (63 - 81)  RR: 18 (02 Feb 2023 08:39) (15 - 18)  SpO2: 99% (02 Feb 2023 08:39) (95% - 99%)    Parameters below as of 02 Feb 2023 08:39  Patient On (Oxygen Delivery Method): room air    
Vital Signs Last 24 Hrs  T(C): 37.2 (30 Jan 2023 09:15), Max: 38.6 (29 Jan 2023 16:41)  T(F): 99 (30 Jan 2023 09:15), Max: 101.5 (29 Jan 2023 16:41)  HR: 106 (30 Jan 2023 12:40) (94 - 112)  BP: 98/65 (30 Jan 2023 12:40) (72/51 - 148/67)  BP(mean): 74 (30 Jan 2023 12:40) (57 - 96)  RR: 18 (30 Jan 2023 12:40) (14 - 18)  SpO2: 97% (30 Jan 2023 12:40) (95% - 100%)    Parameters below as of 30 Jan 2023 12:40  Patient On (Oxygen Delivery Method): room air    
Vital Signs Last 24 Hrs  T(C): 37.3 (23 Feb 2023 09:10), Max: 38.9 (23 Feb 2023 04:00)  T(F): 99.2 (23 Feb 2023 09:10), Max: 102.1 (23 Feb 2023 06:33)  HR: 92 (23 Feb 2023 09:10) (92 - 114)  BP: 126/70 (23 Feb 2023 09:10) (98/52 - 133/60)  BP(mean): 92 (23 Feb 2023 09:10) (71 - 92)  RR: 17 (23 Feb 2023 09:10) (16 - 18)  SpO2: 100% (23 Feb 2023 09:10) (100% - 100%)    Parameters below as of 23 Feb 2023 09:10  Patient On (Oxygen Delivery Method): room air    
Vital Signs Last 24 Hrs  T(C): 37.2 (26 Jan 2023 18:05), Max: 38.1 (25 Jan 2023 22:12)  T(F): 98.9 (26 Jan 2023 18:05), Max: 100.6 (25 Jan 2023 22:12)  HR: 110 (26 Jan 2023 18:00) (94 - 110)  BP: 122/65 (26 Jan 2023 18:00) (64/42 - 150/63)  BP(mean): 80 (26 Jan 2023 18:00) (49 - 101)  RR: 18 (26 Jan 2023 18:00) (17 - 18)  SpO2: 98% (26 Jan 2023 18:00) (95% - 100%)    Parameters below as of 26 Jan 2023 18:00  Patient On (Oxygen Delivery Method): room air

## 2023-03-08 NOTE — BH CONSULTATION LIAISON PROGRESS NOTE - NSBHMSEGROOM_PSY_A_CORE
Poor
Fair
Fair
Poor
Poor
Fair
Poor
Fair
Poor
Poor
Fair
Fair
Poor
Fair
Fair

## 2023-03-08 NOTE — PROGRESS NOTE ADULT - ASSESSMENT
77M first presented to [facility] from [facility] due to unresponsiveness (responded to Narcan). At [facility], found to have subsegmental pulmonary embolism in RUL, rapid atrial flutter with severely reduced LVEF with LV thrombus. Transferred to [facility] on 12/7/22 for LORENZO and possible ablation. At [facility], was found to have left leg ischemia (left leg arterial thrombus on LE duplex on 12/8). Was being considered for rhythm control when he developed abdominal pain, CTA (12/10/22) showed embolus in SMA, bowel infarct, requiring ex-lap on 12/11 with SMA embolectomy, 80cm small bowel resection; On 12/13, underwent 2nd look laparotomy, with 80cm small bowel resection, closure with abthera. On 12/15, underwent 3rd look laparotomy, end-to-end anastomosis of remaining bowel, loop ileostomy and abdominal closure. Eventually underwent LORENZO/DCCV on 12/30/22, now in sinus rhythm. More recently found to have k. 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     #bacteremia - k pneumoniae, VRE facium suspected from abdominal abscess  #post op state  3.7   - recent blood cultures from 3/1 without any growth, continue with daptomycin and ertapenem, send repeat b cx's as pt is high risk for opportunistic infections. s/p OR w washout 3/7-> remains afebrile for now   - continue to monitor for fever - appreciate ongoing ID input  - 3/7 ostomy reversal    #s/p left AKA  - s/p OR with vascular for wound closure     #anemia of chronic disease  - hg 7.9 after 1 prbc - ->8.4 --7.9-8.9  - no e/o active bleeding  - asymptomatic  - transfusion goal > 7     #Suicidal Ideation  #Insomnia   - Continue mirtazapine 30mg QHS   - psychiatry following, and appreciate any input    # HFrEF, not in exacerbation   - TTE from 2/11 reviewed, normal LVEF   - Metoprolol succinate 25mg qd - continue  - not volume overloaded.  cont to hold entresto and spironolactone.      # Atrial Flutter  - s/p DCCV 12/30  - EP recs - uninterrupted AC x 30 days ; January 29th was 30 days post DCCV.   -full dose Lovenox on hold post op, cw amiodarone and metoprolol succinate    #hyponatremia  -nl Na 138      -# Pulmonary embolism (subsegmental RUL)   - After completion of AC for DCCV, would need to continue AC for PE - currently primary team holding lovenox post op     # Bowel ischemia - s/p SMA embolectomy; Small bowel resection  - ileostomy reversed in OR for 3/7  - plan of primary team  - continue loperamide and diphenoxylate atropine    # Severe protein-calorie malnutrition  # Sacral wound   - appreciate input from nutrition  - Wound care per nursing      #DVT ppx - continue lovenox for PE when appropriate to resume by primary team

## 2023-03-08 NOTE — BH CONSULTATION LIAISON PROGRESS NOTE - NSBHMSEBEHAV_PSY_A_CORE
Cooperative

## 2023-03-08 NOTE — BH CONSULTATION LIAISON PROGRESS NOTE - NSBHCONSULTFOLLOW_PSY_ALL_CORE
Yes...
No, psychiatric follow up needed - call with questions...
Yes...

## 2023-03-08 NOTE — PROGRESS NOTE ADULT - ASSESSMENT
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  Patient afebrile since drainage.  Fluid cultures from OR with Klebsiella suggesting this is the source of the bacteremia     Recommend:  1.  Continue Daptomycin 500 mg IV daily  2.  Continue Ertapenem 1 gram IV daily  3.  Follow up OR cultures    ID team 2 will follow

## 2023-03-08 NOTE — PROGRESS NOTE ADULT - ASSESSMENT
77M PMHx of IVDU found unresponsive at his nursing home, resolved after Narcan in the field and brought to [facility]. Found to have PE, atrial flutter, and large LV thrombus on echo. Transferred to [facility] for further management. Pt c/o abdominal pain on 12/10 CTA showing mid SMA with embolus. Abnormal distal small bowel loops and cecum with dilatation and pneumatosis suggesting infarcted bowel. Now s/p Ex lap, SMA embolectomy, 80cm SBR, abthera vac left in discontinuity (12/11). S/p second look (12/13), s/p OR for 3rd look, end-to-end anastomosis of remaining bowel, loop ileostomy and abdomen closure (12/15). S/p LORENZO and Cardioversion on 12/30 with cardiology. Patient was nutritionally optimized. 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 s/p L AKA closure (3/1), s/p ileostomy reversal (3/7).    -Regular diet with TPN via picc (2/20)  -ID following: Erta( 2/13- ),  (Dapto 2/11- )    -pain/nausea control prn   -Continue metoprolol/amiodarone   -Holding spironolactone, entresto   -SCDs, SQL 65BID.   -Dispo: ROWAN

## 2023-03-08 NOTE — PROGRESS NOTE ADULT - SUBJECTIVE AND OBJECTIVE BOX
SUBJECTIVE: Patient seen and evaluated. Pain well controlled. No specific complaints this AM        MEDICATIONS  (STANDING):  aMIOdarone    Tablet 100 milliGRAM(s) Oral every 24 hours  ascorbic acid 500 milliGRAM(s) Oral daily  chlorhexidine 2% Cloths 1 Application(s) Topical daily  cholestyramine Powder (Sugar-Free) 2 Gram(s) Oral three times a day  DAPTOmycin IVPB 500 milliGRAM(s) IV Intermittent every 24 hours  ertapenem  IVPB 1000 milliGRAM(s) IV Intermittent every 24 hours  fat emulsion (Fish Oil and Plant Based) 20% Infusion 0.7764 Gm/kG/Day (20.8 mL/Hr) IV Continuous <Continuous>  heparin   Injectable 5000 Unit(s) SubCutaneous every 8 hours  influenza  Vaccine (HIGH DOSE) 0.7 milliLiter(s) IntraMuscular once  metoprolol succinate ER 25 milliGRAM(s) Oral daily  mirtazapine 30 milliGRAM(s) Oral at bedtime  multivitamin 1 Tablet(s) Oral daily  nystatin Powder 1 Application(s) Topical two times a day  pantoprazole  Injectable 40 milliGRAM(s) IV Push daily  Parenteral Nutrition - Adult 1 Each (83 mL/Hr) TPN Continuous <Continuous>  Parenteral Nutrition - Adult 1 Each (83 mL/Hr) TPN Continuous <Continuous>  povidone iodine 10% Solution 1 Application(s) Topical daily  sodium chloride 0.9% lock flush 10 milliLiter(s) IV Push every 8 hours  zinc sulfate 220 milliGRAM(s) Oral daily    MEDICATIONS  (PRN):  acetaminophen     Tablet .. 650 milliGRAM(s) Oral every 6 hours PRN Temp greater or equal to 38C (100.4F), Mild Pain (1 - 3)  HYDROmorphone  Injectable 0.5 milliGRAM(s) IV Push every 4 hours PRN breakthrough pain  melatonin 3 milliGRAM(s) Oral at bedtime PRN Insomnia  ondansetron Injectable 4 milliGRAM(s) IV Push every 6 hours PRN Nausea and/or Vomiting  oxyCODONE    IR 10 milliGRAM(s) Oral every 4 hours PRN Severe Pain (7 - 10)  oxyCODONE    IR 5 milliGRAM(s) Oral every 4 hours PRN Moderate Pain (4 - 6)  sodium chloride 0.9% lock flush 10 milliLiter(s) IV Push every 1 hour PRN Pre/post blood products, medications, blood draw, and to maintain line patency      Vital Signs Last 24 Hrs  T(C): 37 (08 Mar 2023 09:38), Max: 37.6 (07 Mar 2023 20:29)  T(F): 98.6 (08 Mar 2023 09:38), Max: 99.6 (07 Mar 2023 20:29)  HR: 112 (08 Mar 2023 12:10) (92 - 112)  BP: 121/79 (08 Mar 2023 12:10) (100/63 - 142/78)  BP(mean): 96 (08 Mar 2023 12:10) (77 - 102)  RR: 18 (08 Mar 2023 12:10) (10 - 18)  SpO2: 100% (08 Mar 2023 12:10) (100% - 100%)    Parameters below as of 08 Mar 2023 12:10  Patient On (Oxygen Delivery Method): room air        Physical Exam:  General: NAD, resting comfortably in bed  Pulmonary: Nonlabored breathing, no respiratory distress  Cardiovascular: NSR  Abdominal: soft, appropriate tenderness, minimal st ileostomy site  Extremities: LLE dressing cdi, changed, site without dehiscence or stigmata of infection    I&O's Summary    07 Mar 2023 07:01  -  08 Mar 2023 07:00  --------------------------------------------------------  IN: 1677 mL / OUT: 2250 mL / NET: -573 mL    08 Mar 2023 07:01  -  08 Mar 2023 12:27  --------------------------------------------------------  IN: 0 mL / OUT: 750 mL / NET: -750 mL        LABS:                        8.9    14.39 )-----------( 533      ( 08 Mar 2023 05:30 )             28.3     03-08    139  |  101  |  13  ----------------------------<  135<H>  4.7   |  29  |  0.43<L>    Ca    8.6      08 Mar 2023 05:30  Phos  4.4     03-08  Mg     2.1     03-08      PT/INR - ( 07 Mar 2023 16:16 )   PT: 14.8 sec;   INR: 1.24          PTT - ( 07 Mar 2023 16:16 )  PTT:32.9 sec    CAPILLARY BLOOD GLUCOSE            RADIOLOGY & ADDITIONAL STUDIES:

## 2023-03-08 NOTE — BH CONSULTATION LIAISON PROGRESS NOTE - CURRENT MEDICATION
MEDICATIONS  (STANDING):  aMIOdarone    Tablet 100 milliGRAM(s) Oral every 24 hours  ascorbic acid 500 milliGRAM(s) Oral daily  chlorhexidine 2% Cloths 1 Application(s) Topical <User Schedule>  chlorhexidine 2% Cloths 1 Application(s) Topical <User Schedule>  dextrose 5%. 1000 milliLiter(s) (50 mL/Hr) IV Continuous <Continuous>  dextrose 5%. 1000 milliLiter(s) (100 mL/Hr) IV Continuous <Continuous>  diphenoxylate/atropine 2 Tablet(s) Oral every 8 hours  enoxaparin Injectable 60 milliGRAM(s) SubCutaneous every 12 hours  fat emulsion (Fish Oil and Plant Based) 20% Infusion 0.31 Gm/kG/Day (8.33 mL/Hr) IV Continuous <Continuous>  glucagon  Injectable 1 milliGRAM(s) IntraMuscular once  influenza  Vaccine (HIGH DOSE) 0.7 milliLiter(s) IntraMuscular once  insulin lispro (ADMELOG) corrective regimen sliding scale   SubCutaneous every 6 hours  lactated ringers. 1000 milliLiter(s) (50 mL/Hr) IV Continuous <Continuous>  loperamide 4 milliGRAM(s) Oral every 8 hours  metoprolol succinate ER 25 milliGRAM(s) Oral daily  mirtazapine 30 milliGRAM(s) Oral at bedtime  multivitamin 1 Tablet(s) Oral daily  opium Tincture 6 milliGRAM(s) Oral every 12 hours  pantoprazole    Tablet 40 milliGRAM(s) Oral two times a day  Parenteral Nutrition - Adult 1 Each (63 mL/Hr) TPN Continuous <Continuous>  psyllium Powder 1 Packet(s) Oral every 8 hours  sacubitril 24 mG/valsartan 26 mG 1 Tablet(s) Oral two times a day  silver sulfADIAZINE 1% Cream 1 Application(s) Topical daily  spironolactone 25 milliGRAM(s) Oral daily  zinc sulfate 220 milliGRAM(s) Oral daily    MEDICATIONS  (PRN):  acetaminophen     Tablet .. 650 milliGRAM(s) Oral every 6 hours PRN Temp greater or equal to 38C (100.4F), Mild Pain (1 - 3)  dextrose Oral Gel 15 Gram(s) Oral once PRN Blood Glucose LESS THAN 70 milliGRAM(s)/deciliter  melatonin 3 milliGRAM(s) Oral at bedtime PRN Insomnia  ondansetron Injectable 4 milliGRAM(s) IV Push every 6 hours PRN Nausea and/or Vomiting  oxyCODONE    IR 5 milliGRAM(s) Oral every 6 hours PRN Severe Pain (7 - 10)  sodium chloride 0.9% lock flush 10 milliLiter(s) IV Push every 1 hour PRN Pre/post blood products, medications, blood draw, and to maintain line patency  
MEDICATIONS  (STANDING):  aMIOdarone    Tablet 100 milliGRAM(s) Oral every 24 hours  ascorbic acid 500 milliGRAM(s) Oral daily  chlorhexidine 2% Cloths 1 Application(s) Topical <User Schedule>  chlorhexidine 2% Cloths 1 Application(s) Topical <User Schedule>  dextrose 5%. 1000 milliLiter(s) (50 mL/Hr) IV Continuous <Continuous>  dextrose 5%. 1000 milliLiter(s) (100 mL/Hr) IV Continuous <Continuous>  diphenoxylate/atropine 2 Tablet(s) Oral every 8 hours  enoxaparin Injectable 60 milliGRAM(s) SubCutaneous every 12 hours  fat emulsion (Fish Oil and Plant Based) 20% Infusion 0.7764 Gm/kG/Day (20.83 mL/Hr) IV Continuous <Continuous>  glucagon  Injectable 1 milliGRAM(s) IntraMuscular once  influenza  Vaccine (HIGH DOSE) 0.7 milliLiter(s) IntraMuscular once  insulin lispro (ADMELOG) corrective regimen sliding scale   SubCutaneous every 6 hours  lactated ringers Bolus 300 milliLiter(s) IV Bolus once  loperamide 4 milliGRAM(s) Oral every 8 hours  metoprolol succinate ER 25 milliGRAM(s) Oral daily  mirtazapine 30 milliGRAM(s) Oral at bedtime  multivitamin 1 Tablet(s) Oral daily  opium Tincture 6 milliGRAM(s) Oral every 12 hours  pantoprazole    Tablet 40 milliGRAM(s) Oral two times a day  Parenteral Nutrition - Adult 1 Each (83 mL/Hr) TPN Continuous <Continuous>  piperacillin/tazobactam IVPB.. 3.375 Gram(s) IV Intermittent every 8 hours  psyllium Powder 1 Packet(s) Oral every 8 hours  sacubitril 24 mG/valsartan 26 mG 1 Tablet(s) Oral two times a day  silver sulfADIAZINE 1% Cream 1 Application(s) Topical daily  spironolactone 25 milliGRAM(s) Oral daily  zinc sulfate 220 milliGRAM(s) Oral daily    MEDICATIONS  (PRN):  acetaminophen     Tablet .. 650 milliGRAM(s) Oral every 6 hours PRN Temp greater or equal to 38C (100.4F), Mild Pain (1 - 3)  dextrose Oral Gel 15 Gram(s) Oral once PRN Blood Glucose LESS THAN 70 milliGRAM(s)/deciliter  melatonin 3 milliGRAM(s) Oral at bedtime PRN Insomnia  ondansetron Injectable 4 milliGRAM(s) IV Push every 6 hours PRN Nausea and/or Vomiting  sodium chloride 0.9% lock flush 10 milliLiter(s) IV Push every 1 hour PRN Pre/post blood products, medications, blood draw, and to maintain line patency  
MEDICATIONS  (STANDING):  aMIOdarone    Tablet 100 milliGRAM(s) Oral every 24 hours  ascorbic acid 500 milliGRAM(s) Oral daily  chlorhexidine 2% Cloths 1 Application(s) Topical <User Schedule>  cholestyramine Powder (Sugar-Free) 2 Gram(s) Oral three times a day  DAPTOmycin IVPB      DAPTOmycin IVPB 500 milliGRAM(s) IV Intermittent every 24 hours  dextrose 5% + lactated ringers. 1000 milliLiter(s) (100 mL/Hr) IV Continuous <Continuous>  dextrose 5%. 1000 milliLiter(s) (100 mL/Hr) IV Continuous <Continuous>  dextrose 5%. 1000 milliLiter(s) (50 mL/Hr) IV Continuous <Continuous>  dextrose 5%. 1000 milliLiter(s) (50 mL/Hr) IV Continuous <Continuous>  dextrose 5%. 1000 milliLiter(s) (100 mL/Hr) IV Continuous <Continuous>  dextrose 50% Injectable 25 Gram(s) IV Push once  dextrose 50% Injectable 12.5 Gram(s) IV Push once  dextrose 50% Injectable 25 Gram(s) IV Push once  diphenoxylate/atropine 2 Tablet(s) Oral every 8 hours  ertapenem  IVPB 1000 milliGRAM(s) IV Intermittent every 24 hours  glucagon  Injectable 1 milliGRAM(s) IntraMuscular once  glucagon  Injectable 1 milliGRAM(s) IntraMuscular once  heparin  Infusion 1000 Unit(s)/Hr (14 mL/Hr) IV Continuous <Continuous>  influenza  Vaccine (HIGH DOSE) 0.7 milliLiter(s) IntraMuscular once  insulin lispro (ADMELOG) corrective regimen sliding scale   SubCutaneous every 6 hours  loperamide 4 milliGRAM(s) Oral every 8 hours  metoprolol succinate ER 25 milliGRAM(s) Oral daily  mirtazapine 30 milliGRAM(s) Oral at bedtime  multivitamin 1 Tablet(s) Oral daily  opium Tincture 6 milliGRAM(s) Oral every 12 hours  pantoprazole    Tablet 40 milliGRAM(s) Oral two times a day  povidone iodine 10% Solution 1 Application(s) Topical daily  psyllium Powder 1 Packet(s) Oral every 8 hours  sacubitril 49 mG/valsartan 51 mG 1 Tablet(s) Oral two times a day  zinc sulfate 220 milliGRAM(s) Oral daily    MEDICATIONS  (PRN):  acetaminophen     Tablet .. 650 milliGRAM(s) Oral every 6 hours PRN Temp greater or equal to 38C (100.4F), Mild Pain (1 - 3)  dextrose Oral Gel 15 Gram(s) Oral once PRN Blood Glucose LESS THAN 70 milliGRAM(s)/deciliter  dextrose Oral Gel 15 Gram(s) Oral once PRN Blood Glucose LESS THAN 70 milliGRAM(s)/deciliter  melatonin 3 milliGRAM(s) Oral at bedtime PRN Insomnia  ondansetron Injectable 4 milliGRAM(s) IV Push every 6 hours PRN Nausea and/or Vomiting  sodium chloride 0.9% lock flush 10 milliLiter(s) IV Push every 1 hour PRN Pre/post blood products, medications, blood draw, and to maintain line patency  
MEDICATIONS  (STANDING):  aMIOdarone    Tablet 100 milliGRAM(s) Oral every 24 hours  ascorbic acid 500 milliGRAM(s) Oral daily  chlorhexidine 2% Cloths 1 Application(s) Topical <User Schedule>  cholestyramine Powder (Sugar-Free) 2 Gram(s) Oral three times a day  DAPTOmycin IVPB      DAPTOmycin IVPB 500 milliGRAM(s) IV Intermittent every 24 hours  dextrose 5% + lactated ringers. 1000 milliLiter(s) (100 mL/Hr) IV Continuous <Continuous>  dextrose 5%. 1000 milliLiter(s) (100 mL/Hr) IV Continuous <Continuous>  dextrose 5%. 1000 milliLiter(s) (50 mL/Hr) IV Continuous <Continuous>  dextrose 5%. 1000 milliLiter(s) (50 mL/Hr) IV Continuous <Continuous>  dextrose 5%. 1000 milliLiter(s) (100 mL/Hr) IV Continuous <Continuous>  dextrose 50% Injectable 25 Gram(s) IV Push once  dextrose 50% Injectable 12.5 Gram(s) IV Push once  dextrose 50% Injectable 25 Gram(s) IV Push once  diphenoxylate/atropine 2 Tablet(s) Oral every 8 hours  ertapenem  IVPB 1000 milliGRAM(s) IV Intermittent every 24 hours  glucagon  Injectable 1 milliGRAM(s) IntraMuscular once  glucagon  Injectable 1 milliGRAM(s) IntraMuscular once  heparin  Infusion 1000 Unit(s)/Hr (14 mL/Hr) IV Continuous <Continuous>  influenza  Vaccine (HIGH DOSE) 0.7 milliLiter(s) IntraMuscular once  insulin lispro (ADMELOG) corrective regimen sliding scale   SubCutaneous every 6 hours  loperamide 4 milliGRAM(s) Oral every 8 hours  metoprolol succinate ER 25 milliGRAM(s) Oral daily  mirtazapine 30 milliGRAM(s) Oral at bedtime  multivitamin 1 Tablet(s) Oral daily  opium Tincture 6 milliGRAM(s) Oral every 12 hours  pantoprazole    Tablet 40 milliGRAM(s) Oral two times a day  psyllium Powder 1 Packet(s) Oral every 8 hours  sacubitril 24 mG/valsartan 26 mG 1 Tablet(s) Oral two times a day  zinc sulfate 220 milliGRAM(s) Oral daily    MEDICATIONS  (PRN):  acetaminophen     Tablet .. 650 milliGRAM(s) Oral every 6 hours PRN Temp greater or equal to 38C (100.4F), Mild Pain (1 - 3)  dextrose Oral Gel 15 Gram(s) Oral once PRN Blood Glucose LESS THAN 70 milliGRAM(s)/deciliter  dextrose Oral Gel 15 Gram(s) Oral once PRN Blood Glucose LESS THAN 70 milliGRAM(s)/deciliter  melatonin 3 milliGRAM(s) Oral at bedtime PRN Insomnia  ondansetron Injectable 4 milliGRAM(s) IV Push every 6 hours PRN Nausea and/or Vomiting  sodium chloride 0.9% lock flush 10 milliLiter(s) IV Push every 1 hour PRN Pre/post blood products, medications, blood draw, and to maintain line patency  
MEDICATIONS  (STANDING):  aMIOdarone    Tablet 100 milliGRAM(s) Oral every 24 hours  ascorbic acid 500 milliGRAM(s) Oral daily  chlorhexidine 2% Cloths 1 Application(s) Topical <User Schedule>  chlorhexidine 2% Cloths 1 Application(s) Topical <User Schedule>  dextrose 5%. 1000 milliLiter(s) (50 mL/Hr) IV Continuous <Continuous>  dextrose 5%. 1000 milliLiter(s) (100 mL/Hr) IV Continuous <Continuous>  diphenoxylate/atropine 2 Tablet(s) Oral every 8 hours  enoxaparin Injectable 60 milliGRAM(s) SubCutaneous every 12 hours  fat emulsion (Fish Oil and Plant Based) 20% Infusion 0.7764 Gm/kG/Day (20.8 mL/Hr) IV Continuous <Continuous>  glucagon  Injectable 1 milliGRAM(s) IntraMuscular once  influenza  Vaccine (HIGH DOSE) 0.7 milliLiter(s) IntraMuscular once  insulin lispro (ADMELOG) corrective regimen sliding scale   SubCutaneous every 6 hours  loperamide 4 milliGRAM(s) Oral every 8 hours  metoprolol succinate ER 25 milliGRAM(s) Oral daily  mirtazapine 30 milliGRAM(s) Oral at bedtime  multivitamin 1 Tablet(s) Oral daily  opium Tincture 6 milliGRAM(s) Oral every 12 hours  pantoprazole    Tablet 40 milliGRAM(s) Oral two times a day  Parenteral Nutrition - Adult 1 Each (83 mL/Hr) TPN Continuous <Continuous>  Parenteral Nutrition - Adult 1 Each (83 mL/Hr) TPN Continuous <Continuous>  piperacillin/tazobactam IVPB. 3.375 Gram(s) IV Intermittent once  piperacillin/tazobactam IVPB.- 3.375 Gram(s) IV Intermittent once  piperacillin/tazobactam IVPB.. 3.375 Gram(s) IV Intermittent every 8 hours  psyllium Powder 1 Packet(s) Oral every 8 hours  sacubitril 24 mG/valsartan 26 mG 1 Tablet(s) Oral two times a day  silver sulfADIAZINE 1% Cream 1 Application(s) Topical daily  spironolactone 25 milliGRAM(s) Oral daily  zinc sulfate 220 milliGRAM(s) Oral daily    MEDICATIONS  (PRN):  acetaminophen     Tablet .. 650 milliGRAM(s) Oral every 6 hours PRN Temp greater or equal to 38C (100.4F), Mild Pain (1 - 3)  dextrose Oral Gel 15 Gram(s) Oral once PRN Blood Glucose LESS THAN 70 milliGRAM(s)/deciliter  melatonin 3 milliGRAM(s) Oral at bedtime PRN Insomnia  ondansetron Injectable 4 milliGRAM(s) IV Push every 6 hours PRN Nausea and/or Vomiting  sodium chloride 0.9% lock flush 10 milliLiter(s) IV Push every 1 hour PRN Pre/post blood products, medications, blood draw, and to maintain line patency  
MEDICATIONS  (STANDING):  aMIOdarone    Tablet 100 milliGRAM(s) Oral every 24 hours  ascorbic acid 500 milliGRAM(s) Oral daily  chlorhexidine 2% Cloths 1 Application(s) Topical <User Schedule>  chlorhexidine 2% Cloths 1 Application(s) Topical <User Schedule>  dextrose 5%. 1000 milliLiter(s) (50 mL/Hr) IV Continuous <Continuous>  dextrose 5%. 1000 milliLiter(s) (100 mL/Hr) IV Continuous <Continuous>  diphenoxylate/atropine 2 Tablet(s) Oral every 8 hours  enoxaparin Injectable 60 milliGRAM(s) SubCutaneous every 12 hours  fat emulsion (Fish Oil and Plant Based) 20% Infusion 0.7764 Gm/kG/Day (20.83 mL/Hr) IV Continuous <Continuous>  glucagon  Injectable 1 milliGRAM(s) IntraMuscular once  influenza  Vaccine (HIGH DOSE) 0.7 milliLiter(s) IntraMuscular once  insulin lispro (ADMELOG) corrective regimen sliding scale   SubCutaneous every 6 hours  loperamide 4 milliGRAM(s) Oral every 8 hours  metoprolol succinate ER 25 milliGRAM(s) Oral daily  mirtazapine 30 milliGRAM(s) Oral at bedtime  multivitamin 1 Tablet(s) Oral daily  opium Tincture 6 milliGRAM(s) Oral every 12 hours  pantoprazole    Tablet 40 milliGRAM(s) Oral two times a day  Parenteral Nutrition - Adult 1 Each (83 mL/Hr) TPN Continuous <Continuous>  Parenteral Nutrition - Adult 1 Each (83 mL/Hr) TPN Continuous <Continuous>  piperacillin/tazobactam IVPB.. 3.375 Gram(s) IV Intermittent every 8 hours  psyllium Powder 1 Packet(s) Oral every 8 hours  sacubitril 24 mG/valsartan 26 mG 1 Tablet(s) Oral two times a day  silver sulfADIAZINE 1% Cream 1 Application(s) Topical daily  spironolactone 25 milliGRAM(s) Oral daily  zinc sulfate 220 milliGRAM(s) Oral daily    MEDICATIONS  (PRN):  acetaminophen     Tablet .. 650 milliGRAM(s) Oral every 6 hours PRN Temp greater or equal to 38C (100.4F), Mild Pain (1 - 3)  dextrose Oral Gel 15 Gram(s) Oral once PRN Blood Glucose LESS THAN 70 milliGRAM(s)/deciliter  melatonin 3 milliGRAM(s) Oral at bedtime PRN Insomnia  ondansetron Injectable 4 milliGRAM(s) IV Push every 6 hours PRN Nausea and/or Vomiting  sodium chloride 0.9% lock flush 10 milliLiter(s) IV Push every 1 hour PRN Pre/post blood products, medications, blood draw, and to maintain line patency  
MEDICATIONS  (STANDING):  aMIOdarone    Tablet 100 milliGRAM(s) Oral every 24 hours  ascorbic acid 500 milliGRAM(s) Oral daily  chlorhexidine 2% Cloths 1 Application(s) Topical <User Schedule>  chlorhexidine 2% Cloths 1 Application(s) Topical <User Schedule>  dextrose 5%. 1000 milliLiter(s) (50 mL/Hr) IV Continuous <Continuous>  dextrose 5%. 1000 milliLiter(s) (100 mL/Hr) IV Continuous <Continuous>  dronabinol 5 milliGRAM(s) Oral every 12 hours  enoxaparin Injectable 60 milliGRAM(s) SubCutaneous every 12 hours  fat emulsion (Fish Oil and Plant Based) 20% Infusion 0.31 Gm/kG/Day (8.33 mL/Hr) IV Continuous <Continuous>  glucagon  Injectable 1 milliGRAM(s) IntraMuscular once  influenza  Vaccine (HIGH DOSE) 0.7 milliLiter(s) IntraMuscular once  insulin lispro (ADMELOG) corrective regimen sliding scale   SubCutaneous every 6 hours  lactated ringers. 1000 milliLiter(s) (100 mL/Hr) IV Continuous <Continuous>  metoprolol succinate ER 25 milliGRAM(s) Oral daily  mirtazapine 15 milliGRAM(s) Oral at bedtime  multivitamin 1 Tablet(s) Oral daily  pantoprazole    Tablet 40 milliGRAM(s) Oral two times a day  Parenteral Nutrition - Adult 1 Each (50 mL/Hr) TPN Continuous <Continuous>  Parenteral Nutrition - Adult 1 Each (50 mL/Hr) TPN Continuous <Continuous>  silver sulfADIAZINE 1% Cream 1 Application(s) Topical daily  zinc sulfate 220 milliGRAM(s) Oral daily    MEDICATIONS  (PRN):  acetaminophen     Tablet .. 650 milliGRAM(s) Oral every 6 hours PRN Temp greater or equal to 38C (100.4F), Mild Pain (1 - 3)  dextrose Oral Gel 15 Gram(s) Oral once PRN Blood Glucose LESS THAN 70 milliGRAM(s)/deciliter  melatonin 3 milliGRAM(s) Oral at bedtime PRN Insomnia  ondansetron Injectable 4 milliGRAM(s) IV Push every 6 hours PRN Nausea and/or Vomiting  oxyCODONE    IR 5 milliGRAM(s) Oral every 4 hours PRN Severe Pain (7 - 10)  sodium chloride 0.9% lock flush 10 milliLiter(s) IV Push every 1 hour PRN Pre/post blood products, medications, blood draw, and to maintain line patency  
MEDICATIONS  (STANDING):  aMIOdarone    Tablet 100 milliGRAM(s) Oral every 24 hours  ascorbic acid 500 milliGRAM(s) Oral daily  chlorhexidine 2% Cloths 1 Application(s) Topical <User Schedule>  chlorhexidine 2% Cloths 1 Application(s) Topical <User Schedule>  cholestyramine Powder (Sugar-Free) 2 Gram(s) Oral three times a day  dextrose 5%. 1000 milliLiter(s) (50 mL/Hr) IV Continuous <Continuous>  dextrose 5%. 1000 milliLiter(s) (100 mL/Hr) IV Continuous <Continuous>  diphenoxylate/atropine 2 Tablet(s) Oral every 8 hours  enoxaparin Injectable 60 milliGRAM(s) SubCutaneous every 12 hours  fat emulsion (Fish Oil and Plant Based) 20% Infusion 0.7764 Gm/kG/Day (20.8 mL/Hr) IV Continuous <Continuous>  glucagon  Injectable 1 milliGRAM(s) IntraMuscular once  influenza  Vaccine (HIGH DOSE) 0.7 milliLiter(s) IntraMuscular once  insulin lispro (ADMELOG) corrective regimen sliding scale   SubCutaneous every 6 hours  loperamide 4 milliGRAM(s) Oral every 8 hours  metoprolol succinate ER 25 milliGRAM(s) Oral daily  mirtazapine 30 milliGRAM(s) Oral at bedtime  multivitamin 1 Tablet(s) Oral daily  opium Tincture 6 milliGRAM(s) Oral every 12 hours  pantoprazole    Tablet 40 milliGRAM(s) Oral two times a day  Parenteral Nutrition - Adult 1 Each (83 mL/Hr) TPN Continuous <Continuous>  Parenteral Nutrition - Adult 1 Each (83 mL/Hr) TPN Continuous <Continuous>  psyllium Powder 1 Packet(s) Oral every 8 hours  sacubitril 24 mG/valsartan 26 mG 1 Tablet(s) Oral two times a day  spironolactone 25 milliGRAM(s) Oral daily  zinc sulfate 220 milliGRAM(s) Oral daily    MEDICATIONS  (PRN):  acetaminophen     Tablet .. 650 milliGRAM(s) Oral every 6 hours PRN Temp greater or equal to 38C (100.4F), Mild Pain (1 - 3)  dextrose Oral Gel 15 Gram(s) Oral once PRN Blood Glucose LESS THAN 70 milliGRAM(s)/deciliter  melatonin 3 milliGRAM(s) Oral at bedtime PRN Insomnia  ondansetron Injectable 4 milliGRAM(s) IV Push every 6 hours PRN Nausea and/or Vomiting  sodium chloride 0.9% lock flush 10 milliLiter(s) IV Push every 1 hour PRN Pre/post blood products, medications, blood draw, and to maintain line patency  
MEDICATIONS  (STANDING):  aMIOdarone    Tablet 100 milliGRAM(s) Oral every 24 hours  ascorbic acid 500 milliGRAM(s) Oral daily  chlorhexidine 2% Cloths 1 Application(s) Topical <User Schedule>  chlorhexidine 2% Cloths 1 Application(s) Topical <User Schedule>  dextrose 5%. 1000 milliLiter(s) (50 mL/Hr) IV Continuous <Continuous>  dextrose 5%. 1000 milliLiter(s) (100 mL/Hr) IV Continuous <Continuous>  diphenoxylate/atropine 2 Tablet(s) Oral every 8 hours  enoxaparin Injectable 60 milliGRAM(s) SubCutaneous every 12 hours  fat emulsion (Fish Oil and Plant Based) 20% Infusion 0.7764 Gm/kG/Day (20.8 mL/Hr) IV Continuous <Continuous>  glucagon  Injectable 1 milliGRAM(s) IntraMuscular once  influenza  Vaccine (HIGH DOSE) 0.7 milliLiter(s) IntraMuscular once  insulin lispro (ADMELOG) corrective regimen sliding scale   SubCutaneous every 6 hours  loperamide 4 milliGRAM(s) Oral every 8 hours  metoprolol succinate ER 25 milliGRAM(s) Oral daily  mirtazapine 30 milliGRAM(s) Oral at bedtime  multivitamin 1 Tablet(s) Oral daily  opium Tincture 6 milliGRAM(s) Oral every 12 hours  pantoprazole    Tablet 40 milliGRAM(s) Oral two times a day  Parenteral Nutrition - Adult 1 Each (83 mL/Hr) TPN Continuous <Continuous>  Parenteral Nutrition - Adult 1 Each (83 mL/Hr) TPN Continuous <Continuous>  piperacillin/tazobactam IVPB.. 3.375 Gram(s) IV Intermittent every 8 hours  psyllium Powder 1 Packet(s) Oral every 8 hours  sacubitril 24 mG/valsartan 26 mG 1 Tablet(s) Oral two times a day  spironolactone 25 milliGRAM(s) Oral daily  zinc sulfate 220 milliGRAM(s) Oral daily    MEDICATIONS  (PRN):  acetaminophen     Tablet .. 650 milliGRAM(s) Oral every 6 hours PRN Temp greater or equal to 38C (100.4F), Mild Pain (1 - 3)  dextrose Oral Gel 15 Gram(s) Oral once PRN Blood Glucose LESS THAN 70 milliGRAM(s)/deciliter  melatonin 3 milliGRAM(s) Oral at bedtime PRN Insomnia  ondansetron Injectable 4 milliGRAM(s) IV Push every 6 hours PRN Nausea and/or Vomiting  oxyCODONE    IR 5 milliGRAM(s) Oral every 6 hours PRN Severe Pain (7 - 10)  sodium chloride 0.9% lock flush 10 milliLiter(s) IV Push every 1 hour PRN Pre/post blood products, medications, blood draw, and to maintain line patency  
MEDICATIONS  (STANDING):  aMIOdarone    Tablet 100 milliGRAM(s) Oral every 24 hours  ascorbic acid 500 milliGRAM(s) Oral daily  chlorhexidine 2% Cloths 1 Application(s) Topical <User Schedule>  chlorhexidine 2% Cloths 1 Application(s) Topical <User Schedule>  cholestyramine Powder (Sugar-Free) 2 Gram(s) Oral three times a day  dextrose 5%. 1000 milliLiter(s) (50 mL/Hr) IV Continuous <Continuous>  dextrose 5%. 1000 milliLiter(s) (100 mL/Hr) IV Continuous <Continuous>  diphenoxylate/atropine 2 Tablet(s) Oral every 8 hours  enoxaparin Injectable 60 milliGRAM(s) SubCutaneous every 12 hours  fat emulsion (Fish Oil and Plant Based) 20% Infusion 0.7764 Gm/kG/Day (20.8 mL/Hr) IV Continuous <Continuous>  glucagon  Injectable 1 milliGRAM(s) IntraMuscular once  influenza  Vaccine (HIGH DOSE) 0.7 milliLiter(s) IntraMuscular once  insulin lispro (ADMELOG) corrective regimen sliding scale   SubCutaneous every 6 hours  loperamide 4 milliGRAM(s) Oral every 8 hours  metoprolol succinate ER 25 milliGRAM(s) Oral daily  mirtazapine 30 milliGRAM(s) Oral at bedtime  multivitamin 1 Tablet(s) Oral daily  opium Tincture 6 milliGRAM(s) Oral every 12 hours  pantoprazole    Tablet 40 milliGRAM(s) Oral two times a day  Parenteral Nutrition - Adult 1 Each (83 mL/Hr) TPN Continuous <Continuous>  Parenteral Nutrition - Adult 1 Each (83 mL/Hr) TPN Continuous <Continuous>  piperacillin/tazobactam IVPB.. 3.375 Gram(s) IV Intermittent every 8 hours  psyllium Powder 1 Packet(s) Oral every 8 hours  sacubitril 24 mG/valsartan 26 mG 1 Tablet(s) Oral two times a day  spironolactone 25 milliGRAM(s) Oral daily  zinc sulfate 220 milliGRAM(s) Oral daily    MEDICATIONS  (PRN):  acetaminophen     Tablet .. 650 milliGRAM(s) Oral every 6 hours PRN Temp greater or equal to 38C (100.4F), Mild Pain (1 - 3)  dextrose Oral Gel 15 Gram(s) Oral once PRN Blood Glucose LESS THAN 70 milliGRAM(s)/deciliter  melatonin 3 milliGRAM(s) Oral at bedtime PRN Insomnia  ondansetron Injectable 4 milliGRAM(s) IV Push every 6 hours PRN Nausea and/or Vomiting  oxyCODONE    IR 5 milliGRAM(s) Oral every 6 hours PRN Severe Pain (7 - 10)  sodium chloride 0.9% lock flush 10 milliLiter(s) IV Push every 1 hour PRN Pre/post blood products, medications, blood draw, and to maintain line patency  
MEDICATIONS  (STANDING):  aMIOdarone    Tablet 100 milliGRAM(s) Oral every 24 hours  ascorbic acid 500 milliGRAM(s) Oral daily  chlorhexidine 2% Cloths 1 Application(s) Topical <User Schedule>  chlorhexidine 2% Cloths 1 Application(s) Topical <User Schedule>  dextrose 5%. 1000 milliLiter(s) (50 mL/Hr) IV Continuous <Continuous>  dextrose 5%. 1000 milliLiter(s) (100 mL/Hr) IV Continuous <Continuous>  diphenoxylate/atropine 2 Tablet(s) Oral every 8 hours  enoxaparin Injectable 60 milliGRAM(s) SubCutaneous every 12 hours  fat emulsion (Fish Oil and Plant Based) 20% Infusion 0.7764 Gm/kG/Day (20.8 mL/Hr) IV Continuous <Continuous>  fat emulsion (Fish Oil and Plant Based) 20% Infusion 0.7764 Gm/kG/Day (20.8 mL/Hr) IV Continuous <Continuous>  glucagon  Injectable 1 milliGRAM(s) IntraMuscular once  influenza  Vaccine (HIGH DOSE) 0.7 milliLiter(s) IntraMuscular once  insulin lispro (ADMELOG) corrective regimen sliding scale   SubCutaneous every 6 hours  lactated ringers Bolus 600 milliLiter(s) IV Bolus once  loperamide 4 milliGRAM(s) Oral every 8 hours  metoprolol succinate ER 25 milliGRAM(s) Oral daily  mirtazapine 30 milliGRAM(s) Oral at bedtime  multivitamin 1 Tablet(s) Oral daily  opium Tincture 6 milliGRAM(s) Oral every 12 hours  pantoprazole    Tablet 40 milliGRAM(s) Oral two times a day  Parenteral Nutrition - Adult 1 Each (83 mL/Hr) TPN Continuous <Continuous>  Parenteral Nutrition - Adult 1 Each (83 mL/Hr) TPN Continuous <Continuous>  piperacillin/tazobactam IVPB.. 3.375 Gram(s) IV Intermittent every 8 hours  psyllium Powder 1 Packet(s) Oral every 8 hours  sacubitril 24 mG/valsartan 26 mG 1 Tablet(s) Oral two times a day  silver sulfADIAZINE 1% Cream 1 Application(s) Topical daily  spironolactone 25 milliGRAM(s) Oral daily  zinc sulfate 220 milliGRAM(s) Oral daily    MEDICATIONS  (PRN):  acetaminophen     Tablet .. 650 milliGRAM(s) Oral every 6 hours PRN Temp greater or equal to 38C (100.4F), Mild Pain (1 - 3)  dextrose Oral Gel 15 Gram(s) Oral once PRN Blood Glucose LESS THAN 70 milliGRAM(s)/deciliter  melatonin 3 milliGRAM(s) Oral at bedtime PRN Insomnia  ondansetron Injectable 4 milliGRAM(s) IV Push every 6 hours PRN Nausea and/or Vomiting  sodium chloride 0.9% lock flush 10 milliLiter(s) IV Push every 1 hour PRN Pre/post blood products, medications, blood draw, and to maintain line patency  
MEDICATIONS  (STANDING):  aMIOdarone    Tablet 100 milliGRAM(s) Oral every 24 hours  ascorbic acid 500 milliGRAM(s) Oral daily  chlorhexidine 2% Cloths 1 Application(s) Topical daily  cholestyramine Powder (Sugar-Free) 2 Gram(s) Oral three times a day  DAPTOmycin IVPB 500 milliGRAM(s) IV Intermittent every 24 hours  ertapenem  IVPB 1000 milliGRAM(s) IV Intermittent every 24 hours  fat emulsion (Fish Oil and Plant Based) 20% Infusion 0.7764 Gm/kG/Day (20.8 mL/Hr) IV Continuous <Continuous>  heparin   Injectable 5000 Unit(s) SubCutaneous every 8 hours  influenza  Vaccine (HIGH DOSE) 0.7 milliLiter(s) IntraMuscular once  metoprolol succinate ER 25 milliGRAM(s) Oral daily  mirtazapine 30 milliGRAM(s) Oral at bedtime  multivitamin 1 Tablet(s) Oral daily  nystatin Powder 1 Application(s) Topical two times a day  pantoprazole  Injectable 40 milliGRAM(s) IV Push daily  Parenteral Nutrition - Adult 1 Each (83 mL/Hr) TPN Continuous <Continuous>  Parenteral Nutrition - Adult 1 Each (83 mL/Hr) TPN Continuous <Continuous>  povidone iodine 10% Solution 1 Application(s) Topical daily  sodium chloride 0.9% lock flush 10 milliLiter(s) IV Push every 8 hours  zinc sulfate 220 milliGRAM(s) Oral daily    MEDICATIONS  (PRN):  acetaminophen     Tablet .. 650 milliGRAM(s) Oral every 6 hours PRN Temp greater or equal to 38C (100.4F), Mild Pain (1 - 3)  HYDROmorphone  Injectable 0.5 milliGRAM(s) IV Push every 4 hours PRN breakthrough pain  melatonin 3 milliGRAM(s) Oral at bedtime PRN Insomnia  ondansetron Injectable 4 milliGRAM(s) IV Push every 6 hours PRN Nausea and/or Vomiting  oxyCODONE    IR 10 milliGRAM(s) Oral every 4 hours PRN Severe Pain (7 - 10)  oxyCODONE    IR 5 milliGRAM(s) Oral every 4 hours PRN Moderate Pain (4 - 6)  sodium chloride 0.9% lock flush 10 milliLiter(s) IV Push every 1 hour PRN Pre/post blood products, medications, blood draw, and to maintain line patency  
MEDICATIONS  (STANDING):  aMIOdarone    Tablet 100 milliGRAM(s) Oral every 24 hours  ascorbic acid 500 milliGRAM(s) Oral daily  chlorhexidine 2% Cloths 1 Application(s) Topical <User Schedule>  chlorhexidine 2% Cloths 1 Application(s) Topical <User Schedule>  dextrose 5%. 1000 milliLiter(s) (50 mL/Hr) IV Continuous <Continuous>  dextrose 5%. 1000 milliLiter(s) (100 mL/Hr) IV Continuous <Continuous>  dronabinol 5 milliGRAM(s) Oral every 12 hours  enoxaparin Injectable 60 milliGRAM(s) SubCutaneous every 12 hours  fat emulsion (Fish Oil and Plant Based) 20% Infusion 0.31 Gm/kG/Day (8.32 mL/Hr) IV Continuous <Continuous>  glucagon  Injectable 1 milliGRAM(s) IntraMuscular once  influenza  Vaccine (HIGH DOSE) 0.7 milliLiter(s) IntraMuscular once  insulin lispro (ADMELOG) corrective regimen sliding scale   SubCutaneous every 6 hours  lactated ringers. 1000 milliLiter(s) (100 mL/Hr) IV Continuous <Continuous>  metoprolol succinate ER 25 milliGRAM(s) Oral daily  mirtazapine 15 milliGRAM(s) Oral at bedtime  multivitamin 1 Tablet(s) Oral daily  pantoprazole    Tablet 40 milliGRAM(s) Oral two times a day  Parenteral Nutrition - Adult 1 Each (50 mL/Hr) TPN Continuous <Continuous>  Parenteral Nutrition - Adult 1 Each (50 mL/Hr) TPN Continuous <Continuous>  silver sulfADIAZINE 1% Cream 1 Application(s) Topical daily  zinc sulfate 220 milliGRAM(s) Oral daily    MEDICATIONS  (PRN):  acetaminophen     Tablet .. 650 milliGRAM(s) Oral every 6 hours PRN Temp greater or equal to 38C (100.4F), Mild Pain (1 - 3)  dextrose Oral Gel 15 Gram(s) Oral once PRN Blood Glucose LESS THAN 70 milliGRAM(s)/deciliter  ondansetron Injectable 4 milliGRAM(s) IV Push every 6 hours PRN Nausea and/or Vomiting  oxyCODONE    IR 5 milliGRAM(s) Oral every 4 hours PRN Severe Pain (7 - 10)  sodium chloride 0.9% lock flush 10 milliLiter(s) IV Push every 1 hour PRN Pre/post blood products, medications, blood draw, and to maintain line patency  
MEDICATIONS  (STANDING):  aMIOdarone    Tablet 100 milliGRAM(s) Oral every 24 hours  ascorbic acid 500 milliGRAM(s) Oral daily  chlorhexidine 2% Cloths 1 Application(s) Topical <User Schedule>  chlorhexidine 2% Cloths 1 Application(s) Topical <User Schedule>  dextrose 5%. 1000 milliLiter(s) (50 mL/Hr) IV Continuous <Continuous>  dextrose 5%. 1000 milliLiter(s) (100 mL/Hr) IV Continuous <Continuous>  diphenoxylate/atropine 2 Tablet(s) Oral every 8 hours  enoxaparin Injectable 60 milliGRAM(s) SubCutaneous every 12 hours  fat emulsion (Fish Oil and Plant Based) 20% Infusion 0.7764 Gm/kG/Day (20.83 mL/Hr) IV Continuous <Continuous>  glucagon  Injectable 1 milliGRAM(s) IntraMuscular once  influenza  Vaccine (HIGH DOSE) 0.7 milliLiter(s) IntraMuscular once  insulin lispro (ADMELOG) corrective regimen sliding scale   SubCutaneous every 6 hours  loperamide 4 milliGRAM(s) Oral every 8 hours  metoprolol succinate ER 25 milliGRAM(s) Oral daily  mirtazapine 30 milliGRAM(s) Oral at bedtime  multivitamin 1 Tablet(s) Oral daily  opium Tincture 6 milliGRAM(s) Oral every 12 hours  pantoprazole    Tablet 40 milliGRAM(s) Oral two times a day  Parenteral Nutrition - Adult 1 Each (83 mL/Hr) TPN Continuous <Continuous>  Parenteral Nutrition - Adult 1 Each (83 mL/Hr) TPN Continuous <Continuous>  piperacillin/tazobactam IVPB.. 3.375 Gram(s) IV Intermittent every 8 hours  psyllium Powder 1 Packet(s) Oral every 8 hours  sacubitril 24 mG/valsartan 26 mG 1 Tablet(s) Oral two times a day  spironolactone 25 milliGRAM(s) Oral daily  zinc sulfate 220 milliGRAM(s) Oral daily    MEDICATIONS  (PRN):  acetaminophen     Tablet .. 650 milliGRAM(s) Oral every 6 hours PRN Temp greater or equal to 38C (100.4F), Mild Pain (1 - 3)  dextrose Oral Gel 15 Gram(s) Oral once PRN Blood Glucose LESS THAN 70 milliGRAM(s)/deciliter  melatonin 3 milliGRAM(s) Oral at bedtime PRN Insomnia  ondansetron Injectable 4 milliGRAM(s) IV Push every 6 hours PRN Nausea and/or Vomiting  oxyCODONE    IR 5 milliGRAM(s) Oral every 6 hours PRN Severe Pain (7 - 10)  sodium chloride 0.9% lock flush 10 milliLiter(s) IV Push every 1 hour PRN Pre/post blood products, medications, blood draw, and to maintain line patency  
MEDICATIONS  (STANDING):  aMIOdarone    Tablet 100 milliGRAM(s) Oral every 24 hours  ascorbic acid 500 milliGRAM(s) Oral daily  chlorhexidine 2% Cloths 1 Application(s) Topical <User Schedule>  chlorhexidine 2% Cloths 1 Application(s) Topical <User Schedule>  dextrose 5%. 1000 milliLiter(s) (50 mL/Hr) IV Continuous <Continuous>  dextrose 5%. 1000 milliLiter(s) (100 mL/Hr) IV Continuous <Continuous>  diphenoxylate/atropine 2 Tablet(s) Oral every 8 hours  enoxaparin Injectable 60 milliGRAM(s) SubCutaneous every 12 hours  fat emulsion (Fish Oil and Plant Based) 20% Infusion 0.3106 Gm/kG/Day (8.33 mL/Hr) IV Continuous <Continuous>  glucagon  Injectable 1 milliGRAM(s) IntraMuscular once  influenza  Vaccine (HIGH DOSE) 0.7 milliLiter(s) IntraMuscular once  insulin lispro (ADMELOG) corrective regimen sliding scale   SubCutaneous every 6 hours  lactated ringers. 1000 milliLiter(s) (50 mL/Hr) IV Continuous <Continuous>  loperamide 4 milliGRAM(s) Oral every 8 hours  metoprolol succinate ER 25 milliGRAM(s) Oral daily  mirtazapine 15 milliGRAM(s) Oral at bedtime  multivitamin 1 Tablet(s) Oral daily  pantoprazole    Tablet 40 milliGRAM(s) Oral two times a day  Parenteral Nutrition - Adult 1 Each (83 mL/Hr) TPN Continuous <Continuous>  psyllium Powder 1 Packet(s) Oral every 8 hours  silver sulfADIAZINE 1% Cream 1 Application(s) Topical daily  zinc sulfate 220 milliGRAM(s) Oral daily    MEDICATIONS  (PRN):  acetaminophen     Tablet .. 650 milliGRAM(s) Oral every 6 hours PRN Temp greater or equal to 38C (100.4F), Mild Pain (1 - 3)  dextrose Oral Gel 15 Gram(s) Oral once PRN Blood Glucose LESS THAN 70 milliGRAM(s)/deciliter  melatonin 3 milliGRAM(s) Oral at bedtime PRN Insomnia  ondansetron Injectable 4 milliGRAM(s) IV Push every 6 hours PRN Nausea and/or Vomiting  oxyCODONE    IR 5 milliGRAM(s) Oral every 4 hours PRN Severe Pain (7 - 10)  sodium chloride 0.9% lock flush 10 milliLiter(s) IV Push every 1 hour PRN Pre/post blood products, medications, blood draw, and to maintain line patency  
MEDICATIONS  (STANDING):  aMIOdarone    Tablet 100 milliGRAM(s) Oral every 24 hours  ascorbic acid 500 milliGRAM(s) Oral daily  chlorhexidine 2% Cloths 1 Application(s) Topical <User Schedule>  chlorhexidine 2% Cloths 1 Application(s) Topical <User Schedule>  dextrose 5%. 1000 milliLiter(s) (50 mL/Hr) IV Continuous <Continuous>  dextrose 5%. 1000 milliLiter(s) (100 mL/Hr) IV Continuous <Continuous>  diphenoxylate/atropine 2 Tablet(s) Oral three times a day  dronabinol 5 milliGRAM(s) Oral every 12 hours  enoxaparin Injectable 60 milliGRAM(s) SubCutaneous every 12 hours  fat emulsion (Fish Oil and Plant Based) 20% Infusion 0.7764 Gm/kG/Day (20.8 mL/Hr) IV Continuous <Continuous>  glucagon  Injectable 1 milliGRAM(s) IntraMuscular once  influenza  Vaccine (HIGH DOSE) 0.7 milliLiter(s) IntraMuscular once  insulin lispro (ADMELOG) corrective regimen sliding scale   SubCutaneous every 6 hours  loperamide 4 milliGRAM(s) Oral three times a day  metoprolol succinate ER 25 milliGRAM(s) Oral daily  mirtazapine 15 milliGRAM(s) Oral at bedtime  multivitamin 1 Tablet(s) Oral daily  opium Tincture 6 milliGRAM(s) Oral four times a day  pantoprazole    Tablet 40 milliGRAM(s) Oral two times a day  Parenteral Nutrition - Adult 1 Each (50 mL/Hr) TPN Continuous <Continuous>  Parenteral Nutrition - Adult 1 Each (50 mL/Hr) TPN Continuous <Continuous>  psyllium Powder 1 Packet(s) Oral every 12 hours  silver sulfADIAZINE 1% Cream 1 Application(s) Topical daily  zinc sulfate 220 milliGRAM(s) Oral daily    MEDICATIONS  (PRN):  acetaminophen     Tablet .. 650 milliGRAM(s) Oral every 6 hours PRN Temp greater or equal to 38C (100.4F), Mild Pain (1 - 3)  dextrose Oral Gel 15 Gram(s) Oral once PRN Blood Glucose LESS THAN 70 milliGRAM(s)/deciliter  oxyCODONE    IR 5 milliGRAM(s) Oral every 4 hours PRN Severe Pain (7 - 10)  sodium chloride 0.9% lock flush 10 milliLiter(s) IV Push every 1 hour PRN Pre/post blood products, medications, blood draw, and to maintain line patency  
MEDICATIONS  (STANDING):  aMIOdarone    Tablet 100 milliGRAM(s) Oral every 24 hours  ascorbic acid 500 milliGRAM(s) Oral daily  chlorhexidine 2% Cloths 1 Application(s) Topical daily  cholestyramine Powder (Sugar-Free) 2 Gram(s) Oral three times a day  DAPTOmycin IVPB 500 milliGRAM(s) IV Intermittent every 24 hours  dextrose 5% + lactated ringers. 1000 milliLiter(s) (100 mL/Hr) IV Continuous <Continuous>  diphenoxylate/atropine 2 Tablet(s) Oral every 8 hours  enoxaparin Injectable 65 milliGRAM(s) SubCutaneous every 12 hours  ertapenem  IVPB 1000 milliGRAM(s) IV Intermittent every 24 hours  fat emulsion (Fish Oil and Plant Based) 20% Infusion 0.7764 Gm/kG/Day (20.83 mL/Hr) IV Continuous <Continuous>  influenza  Vaccine (HIGH DOSE) 0.7 milliLiter(s) IntraMuscular once  loperamide 4 milliGRAM(s) Oral every 8 hours  metoprolol succinate ER 25 milliGRAM(s) Oral daily  mirtazapine 30 milliGRAM(s) Oral at bedtime  multivitamin 1 Tablet(s) Oral daily  opium Tincture 6 milliGRAM(s) Oral every 12 hours  pantoprazole    Tablet 40 milliGRAM(s) Oral two times a day  Parenteral Nutrition - Adult 1 Each (83 mL/Hr) TPN Continuous <Continuous>  Parenteral Nutrition - Adult 1 Each (83 mL/Hr) TPN Continuous <Continuous>  potassium chloride   Powder 40 milliEquivalent(s) Oral once  povidone iodine 10% Solution 1 Application(s) Topical daily  psyllium Powder 1 Packet(s) Oral every 8 hours  zinc sulfate 220 milliGRAM(s) Oral daily    MEDICATIONS  (PRN):  acetaminophen     Tablet .. 650 milliGRAM(s) Oral every 6 hours PRN Temp greater or equal to 38C (100.4F), Mild Pain (1 - 3)  melatonin 3 milliGRAM(s) Oral at bedtime PRN Insomnia  ondansetron Injectable 4 milliGRAM(s) IV Push every 6 hours PRN Nausea and/or Vomiting  oxyCODONE    IR 5 milliGRAM(s) Oral every 4 hours PRN Severe Pain (7 - 10)  sodium chloride 0.9% lock flush 10 milliLiter(s) IV Push every 1 hour PRN Pre/post blood products, medications, blood draw, and to maintain line patency  
MEDICATIONS  (STANDING):  aMIOdarone    Tablet 100 milliGRAM(s) Oral every 24 hours  ascorbic acid 500 milliGRAM(s) Oral daily  chlorhexidine 2% Cloths 1 Application(s) Topical <User Schedule>  chlorhexidine 2% Cloths 1 Application(s) Topical <User Schedule>  dextrose 5%. 1000 milliLiter(s) (50 mL/Hr) IV Continuous <Continuous>  dextrose 5%. 1000 milliLiter(s) (100 mL/Hr) IV Continuous <Continuous>  diphenoxylate/atropine 2 Tablet(s) Oral every 8 hours  enoxaparin Injectable 60 milliGRAM(s) SubCutaneous every 12 hours  fat emulsion (Fish Oil and Plant Based) 20% Infusion 0.7764 Gm/kG/Day (20.8 mL/Hr) IV Continuous <Continuous>  glucagon  Injectable 1 milliGRAM(s) IntraMuscular once  influenza  Vaccine (HIGH DOSE) 0.7 milliLiter(s) IntraMuscular once  insulin lispro (ADMELOG) corrective regimen sliding scale   SubCutaneous every 6 hours  loperamide 4 milliGRAM(s) Oral every 8 hours  metoprolol succinate ER 25 milliGRAM(s) Oral daily  mirtazapine 30 milliGRAM(s) Oral at bedtime  multivitamin 1 Tablet(s) Oral daily  opium Tincture 6 milliGRAM(s) Oral every 12 hours  pantoprazole    Tablet 40 milliGRAM(s) Oral two times a day  Parenteral Nutrition - Adult 1 Each (83 mL/Hr) TPN Continuous <Continuous>  Parenteral Nutrition - Adult 1 Each (83 mL/Hr) TPN Continuous <Continuous>  piperacillin/tazobactam IVPB.. 3.375 Gram(s) IV Intermittent every 8 hours  psyllium Powder 1 Packet(s) Oral every 8 hours  sacubitril 24 mG/valsartan 26 mG 1 Tablet(s) Oral two times a day  silver sulfADIAZINE 1% Cream 1 Application(s) Topical daily  spironolactone 25 milliGRAM(s) Oral daily  zinc sulfate 220 milliGRAM(s) Oral daily    MEDICATIONS  (PRN):  acetaminophen     Tablet .. 650 milliGRAM(s) Oral every 6 hours PRN Temp greater or equal to 38C (100.4F), Mild Pain (1 - 3)  dextrose Oral Gel 15 Gram(s) Oral once PRN Blood Glucose LESS THAN 70 milliGRAM(s)/deciliter  melatonin 3 milliGRAM(s) Oral at bedtime PRN Insomnia  ondansetron Injectable 4 milliGRAM(s) IV Push every 6 hours PRN Nausea and/or Vomiting  sodium chloride 0.9% lock flush 10 milliLiter(s) IV Push every 1 hour PRN Pre/post blood products, medications, blood draw, and to maintain line patency  
MEDICATIONS  (STANDING):  aMIOdarone    Tablet 100 milliGRAM(s) Oral every 24 hours  ascorbic acid 500 milliGRAM(s) Oral daily  chlorhexidine 2% Cloths 1 Application(s) Topical <User Schedule>  chlorhexidine 2% Cloths 1 Application(s) Topical <User Schedule>  dextrose 5%. 1000 milliLiter(s) (50 mL/Hr) IV Continuous <Continuous>  dextrose 5%. 1000 milliLiter(s) (100 mL/Hr) IV Continuous <Continuous>  diphenoxylate/atropine 2 Tablet(s) Oral every 8 hours  enoxaparin Injectable 60 milliGRAM(s) SubCutaneous every 12 hours  glucagon  Injectable 1 milliGRAM(s) IntraMuscular once  influenza  Vaccine (HIGH DOSE) 0.7 milliLiter(s) IntraMuscular once  insulin lispro (ADMELOG) corrective regimen sliding scale   SubCutaneous every 6 hours  lactated ringers Bolus 300 milliLiter(s) IV Bolus once  lactated ringers. 1000 milliLiter(s) (50 mL/Hr) IV Continuous <Continuous>  loperamide 4 milliGRAM(s) Oral every 8 hours  metoprolol succinate ER 25 milliGRAM(s) Oral daily  mirtazapine 15 milliGRAM(s) Oral at bedtime  multivitamin 1 Tablet(s) Oral daily  pantoprazole    Tablet 40 milliGRAM(s) Oral two times a day  psyllium Powder 1 Packet(s) Oral every 8 hours  silver sulfADIAZINE 1% Cream 1 Application(s) Topical daily  zinc sulfate 220 milliGRAM(s) Oral daily    MEDICATIONS  (PRN):  acetaminophen     Tablet .. 650 milliGRAM(s) Oral every 6 hours PRN Temp greater or equal to 38C (100.4F), Mild Pain (1 - 3)  dextrose Oral Gel 15 Gram(s) Oral once PRN Blood Glucose LESS THAN 70 milliGRAM(s)/deciliter  melatonin 3 milliGRAM(s) Oral at bedtime PRN Insomnia  ondansetron Injectable 4 milliGRAM(s) IV Push every 6 hours PRN Nausea and/or Vomiting  oxyCODONE    IR 5 milliGRAM(s) Oral every 4 hours PRN Severe Pain (7 - 10)  sodium chloride 0.9% lock flush 10 milliLiter(s) IV Push every 1 hour PRN Pre/post blood products, medications, blood draw, and to maintain line patency  
MEDICATIONS  (STANDING):  aMIOdarone    Tablet 100 milliGRAM(s) Oral every 24 hours  ascorbic acid 500 milliGRAM(s) Oral daily  chlorhexidine 2% Cloths 1 Application(s) Topical <User Schedule>  chlorhexidine 2% Cloths 1 Application(s) Topical <User Schedule>  dextrose 5%. 1000 milliLiter(s) (50 mL/Hr) IV Continuous <Continuous>  dextrose 5%. 1000 milliLiter(s) (100 mL/Hr) IV Continuous <Continuous>  diphenoxylate/atropine 2 Tablet(s) Oral three times a day  dronabinol 5 milliGRAM(s) Oral every 12 hours  enoxaparin Injectable 60 milliGRAM(s) SubCutaneous every 12 hours  fat emulsion (Fish Oil and Plant Based) 20% Infusion 0.7764 Gm/kG/Day (20.8 mL/Hr) IV Continuous <Continuous>  glucagon  Injectable 1 milliGRAM(s) IntraMuscular once  influenza  Vaccine (HIGH DOSE) 0.7 milliLiter(s) IntraMuscular once  insulin lispro (ADMELOG) corrective regimen sliding scale   SubCutaneous every 6 hours  loperamide 4 milliGRAM(s) Oral three times a day  metoprolol succinate ER 25 milliGRAM(s) Oral daily  multivitamin 1 Tablet(s) Oral daily  opium Tincture 6 milliGRAM(s) Oral four times a day  pantoprazole    Tablet 40 milliGRAM(s) Oral two times a day  Parenteral Nutrition - Adult 1 Each (50 mL/Hr) TPN Continuous <Continuous>  Parenteral Nutrition - Adult 1 Each (50 mL/Hr) TPN Continuous <Continuous>  psyllium Powder 1 Packet(s) Oral every 12 hours  silver sulfADIAZINE 1% Cream 1 Application(s) Topical daily  zinc sulfate 220 milliGRAM(s) Oral daily    MEDICATIONS  (PRN):  acetaminophen     Tablet .. 650 milliGRAM(s) Oral every 6 hours PRN Temp greater or equal to 38C (100.4F), Mild Pain (1 - 3)  dextrose Oral Gel 15 Gram(s) Oral once PRN Blood Glucose LESS THAN 70 milliGRAM(s)/deciliter  oxyCODONE    IR 5 milliGRAM(s) Oral every 4 hours PRN Severe Pain (7 - 10)  sodium chloride 0.9% lock flush 10 milliLiter(s) IV Push every 1 hour PRN Pre/post blood products, medications, blood draw, and to maintain line patency  
MEDICATIONS  (STANDING):  aMIOdarone    Tablet 100 milliGRAM(s) Oral every 24 hours  ascorbic acid 500 milliGRAM(s) Oral daily  chlorhexidine 2% Cloths 1 Application(s) Topical <User Schedule>  chlorhexidine 2% Cloths 1 Application(s) Topical <User Schedule>  dextrose 5%. 1000 milliLiter(s) (50 mL/Hr) IV Continuous <Continuous>  dextrose 5%. 1000 milliLiter(s) (100 mL/Hr) IV Continuous <Continuous>  diphenoxylate/atropine 2 Tablet(s) Oral every 8 hours  enoxaparin Injectable 60 milliGRAM(s) SubCutaneous every 12 hours  fat emulsion (Fish Oil and Plant Based) 20% Infusion 0.7764 Gm/kG/Day (20.8 mL/Hr) IV Continuous <Continuous>  glucagon  Injectable 1 milliGRAM(s) IntraMuscular once  influenza  Vaccine (HIGH DOSE) 0.7 milliLiter(s) IntraMuscular once  insulin lispro (ADMELOG) corrective regimen sliding scale   SubCutaneous every 6 hours  loperamide 4 milliGRAM(s) Oral every 8 hours  metoprolol succinate ER 25 milliGRAM(s) Oral daily  mirtazapine 30 milliGRAM(s) Oral at bedtime  multivitamin 1 Tablet(s) Oral daily  opium Tincture 6 milliGRAM(s) Oral every 12 hours  pantoprazole    Tablet 40 milliGRAM(s) Oral two times a day  Parenteral Nutrition - Adult 1 Each (63 mL/Hr) TPN Continuous <Continuous>  Parenteral Nutrition - Adult 1 Each (83 mL/Hr) TPN Continuous <Continuous>  psyllium Powder 1 Packet(s) Oral every 8 hours  sacubitril 24 mG/valsartan 26 mG 1 Tablet(s) Oral two times a day  silver sulfADIAZINE 1% Cream 1 Application(s) Topical daily  spironolactone 25 milliGRAM(s) Oral daily  zinc sulfate 220 milliGRAM(s) Oral daily    MEDICATIONS  (PRN):  acetaminophen     Tablet .. 650 milliGRAM(s) Oral every 6 hours PRN Temp greater or equal to 38C (100.4F), Mild Pain (1 - 3)  dextrose Oral Gel 15 Gram(s) Oral once PRN Blood Glucose LESS THAN 70 milliGRAM(s)/deciliter  melatonin 3 milliGRAM(s) Oral at bedtime PRN Insomnia  ondansetron Injectable 4 milliGRAM(s) IV Push every 6 hours PRN Nausea and/or Vomiting  sodium chloride 0.9% lock flush 10 milliLiter(s) IV Push every 1 hour PRN Pre/post blood products, medications, blood draw, and to maintain line patency

## 2023-03-08 NOTE — BH CONSULTATION LIAISON PROGRESS NOTE - [id number]_PSY_ALL_CORE
Adjustment disorder with depressed mood   F43.21  
Adjustment disorder   F43.20  
Adjustment disorder with depressed mood   F43.21  

## 2023-03-08 NOTE — BH CONSULTATION LIAISON PROGRESS NOTE - NSBHMSESPEECH_PSY_A_CORE
Normal volume, rate, productivity, spontaneity and articulation
Abnormal as indicated, otherwise normal...
Normal volume, rate, productivity, spontaneity and articulation
Abnormal as indicated, otherwise normal...
Normal volume, rate, productivity, spontaneity and articulation
Abnormal as indicated, otherwise normal...
Normal volume, rate, productivity, spontaneity and articulation
Abnormal as indicated, otherwise normal...
Normal volume, rate, productivity, spontaneity and articulation

## 2023-03-08 NOTE — [facility] NURSE CONSULT - REASON FOR CONSULT
ostomy assessment
ostomy care
[facility] nurse consult to assess pressure injuries. The patient is a 76 y/o male with no significant past medical history BIBA from [facility] due to unresponsiveness. Patient was reportedly found unresponsive at his nursing home, Narcan was given in the field with resolution, and patient was taken to [facility]. Pt was persistently tachycardic in 130-140s despite receiving Adenosine 6mg IV and then 12mg IV, Lopressor 2.5mg x2, PO Metoprolol tartrate 100mg. Utox was positive for opioids. CTPA suggestive of subsegmental PE in the right upper lobe. Echo showed severely reduced LV function with an LV thrombus.
ostomy assessement
ostomy education
ostomy assessment
ostomy care/assessment
pressure injury assessment

## 2023-03-08 NOTE — ADVANCED PRACTICE NURSE CONSULT - ASSESSMENT
Lower spine stage 2 pressure injury with dermal tissue measuring 2.5 cm x 1.5 cm. Sacral unstageable pressure injury with 25% eschar and 75% pale non-granulating tissue; wound measuring 1.5 cm x 4 cm draining small amount of serosanguinous exudate.    Patient requires one person assist to turn in bed. Pillows being used to turn and reposition the patient and offload heels.   Jorge L LOPEZ present and assisted during assessment. 
New 2 3/4" Phoenix 2 piece appliance placed. Peristomal area without irritation, stool medium brown, mushy in consistency. 
New Carrie 1 piece high output appliance placed with accordion tubing attached. Appliance has continued to leak due to thick effluent not completely going through tubing into bedside bag. When high output pouch with wide outlet used, drainage is working against gravity because of the specialty bed mattress. Barrier extenders applied to skin barrier to help prevent leakage. 
Pt s/p ex lap, SMA embolectomy and small bowel resection with loop ileostomy. Per bedside RN, pt's ostomy appliance has been leaking. New 2 3/4" Carrie 2 piece appliance placed with RN present, stoma rings used beneath appliance. Stoma mostly dark brown liquid with some thicker pieces in pouch. Peristomal area without breakdown, Cavilon barrier wipes used for skin protection beneath appliance. 
Re-eval of pressure injury to sacrum. Site bleeds easily, unable to measure due to patient's pain. Periwound fungal rash noted, spoke with Team 4 to obtain Nystatin order. Pt remains on specialty bed for pressure injury relief.
Ostomy appliance intact, gas and stool noted in pouch with molly in place at stoma. Pt not amenable to education on ostomy care at this time. 
Ostomy appliance intact, per RN, stool still liquid enough to go through urostomy appliance. Coloplast high-output appliance left at bedside for next appliance change, spoke with JOHN Gracia. 
New Carrie 1 piece appliance with wide outlet and drainage bag placed over stoma. Slight denudement noted at p;eristomal area, powder and Cavilon barrier wipe applied beneath appliance. Effluent continues to be very liquid with a few solid pieces. Extra pouch left at bedside.
New high-output ostomy appliance with drainage bag applied by JOHN Hernandez. Small amount of denudement noted at peristomal area, stoma powder applied and sealed in with skin barrier wipes. 
New Carrie high output appliance placed with wide exit tubing. Output remains liquid, small amount of denudement noted at peristomal area. Stoma powder applied and sealed in with Cavilon barrier wipes, stoma ring used beneath appliance, barrier extenders applied to help prevent leakage.

## 2023-03-08 NOTE — BH CONSULTATION LIAISON PROGRESS NOTE - NSBHMSETHTPROC_PSY_A_CORE
Linear
Linear/Normal reasoning
Circumstantial/Impaired reasoning
Linear
Linear
Linear/Normal reasoning
Linear
Linear/Impaired reasoning
Linear/Normal reasoning
Linear
Linear/Overinclusive/Normal reasoning
Linear
Linear

## 2023-03-08 NOTE — PROGRESS NOTE ADULT - ASSESSMENT
78M with PMHx of IVDU found unresponsive at his nursing home, resolved after Narcan in the field and brought to [facility]. Found to have PE, atrial flutter, and large LV thrombus on ECHOand CTA showing mid SMA with embolus s/p Ex lap, SMA embolectomy, 80cm SBR, abthera vac left in discontinuity (12/11) and transferred to SICU intubated. S/p second look (12/13) and most recently s/p OR for 3rd look, end-to-end anastomosis of remaining bowel, loop ileostomy and abdomen closure (12/15). Prev stepped down to telemetry. LLE ischemia, AKA delayed by nutritional optimization. Transferred to SICU in the setting of sepsis for HD monitoring. 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 Pt w/ resolving tachycardia since OR and transient WBC, surgical site clean and intact w/o signs of infection.    - Local wound care- xeroform, wrap with kerlix and secure with ace wrap daily, changed today  - Remainder of care per primary team  - Vascular will continue to follow call [phone] with any questions or concerns

## 2023-03-08 NOTE — BH CONSULTATION LIAISON PROGRESS NOTE - NSBHATTESTBILLBASEDTIME_PSY_A_CORE
Time based billing

## 2023-03-08 NOTE — BH CONSULTATION LIAISON PROGRESS NOTE - NSBHATTESTCOMMENTATTENDFT_PSY_A_CORE
Patient is a 78 year old male with history of substance use and no significant past medical history (poor medical follow up, last PCP visit 20 years prior to admission), presenting with unresponsiveness from nursing home to [facility], found to be in Aflutter w RVR with subsegmental PE RUL, transferred to [facility], found to have LV thrombus and worsening HF, EF 10-15%. 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 Pt currently s/p BKA. Psychiatry consulted due to chronic depressed mood in the hospital and suicidality. On repeated assessments pt acknowledged low mood and hopelessness in context of his current medical complications and loss of independence but denied any active SI. Pt's mood improved considerably after his medical state improve, along with psychology follow up and starting an SNRI. He presents now with stable mood, denies SI and presents future oriented. Plan:-continue mirtazapine 30mg po qhs,- CL to follow as needed, please call with questions and concerns.

## 2023-03-08 NOTE — BH CONSULTATION LIAISON PROGRESS NOTE - NSBHTIMEACTIVITIESPERFORMED_PSY_A_CORE
psychoeducation, coordination of care 
interview, care coordination, psychoeducation, supportive psychotherapy
interview, care coordination, psychoeducation, supportive psychotherapy
interview, psychoeducation, coordination of care, 
interview, care coordination, psychoeducation, supportive psychotherapy
interview, psychoeducation, coordination of care, 
interview, care coordination, psychoeducation, supportive psychotherapy

## 2023-03-08 NOTE — BH CONSULTATION LIAISON PROGRESS NOTE - NSBHMSEIMPULSE_PSY_A_CORE
Normal
Unable to assess
Normal

## 2023-03-08 NOTE — BH CONSULTATION LIAISON PROGRESS NOTE - NSBHMSEGAIT_PSY_A_CORE
Unable to assess
Other
Unable to assess
Other
Unable to assess
Unable to assess
Other
Unable to assess
Other
Unable to assess
Unable to assess

## 2023-03-08 NOTE — BH CONSULTATION LIAISON PROGRESS NOTE - NSBHMSEAFFCONG_PSY_A_CORE
Congruent

## 2023-03-08 NOTE — BH CONSULTATION LIAISON PROGRESS NOTE - NSBHASSESSMENTFT_PSY_ALL_CORE
Patient is a 78 year old male with history of substance use and no significant past medical history (poor medical follow up, last PCP visit 20 years prior to admission), presenting with unresponsiveness from nursing home to [facility], found to be in Aflutter w RVR with subsegmental PE RUL, transferred to [facility], found to have LV thrombus and worsening HF, EF 10-15%. 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 Course further complicated by LLE pulselessness and ulcerations that will ultimately require BKA. Psychiatry consulted due to chronic depressed mood in the hospital and suicidality. Psychiatry has followed the patient for much of his hospitalization. While still experiencing some depressed mood, the patient's mood has improved and stabilized, with the patient demonstrating greater interpersonal connectedness, less irritability, and reduced suicidality. Patient's past statements of wanting to die appear to be triggered by moments of high distress due to pain and frustration with lack of improvement in his physical state and not actual suicidal statements. The patient presents as stable without any report of SHIIPAVH. No evidence of tyler, psychosis, or aggression. The patient does not appear to be at imminent risk of harm to self or others and does not require inpatient psychiatric hospitalization. No psychiatric contraindications to discharge.     :::Recommendations:::  - 1:1 not needed; low suicide or homicide risk  - Contact CL with questions/concerns  - Psychology will continue to follow  Patient is a 78 year old male with history of substance use and no significant past medical history (poor medical follow up, last PCP visit 20 years prior to admission), presenting with unresponsiveness from nursing home to [facility], found to be in Aflutter w RVR with subsegmental PE RUL, transferred to [facility], found to have LV thrombus and worsening HF, EF 10-15%. 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 Course further complicated by LLE pulselessness and ulcerations that will ultimately require BKA. Psychiatry consulted due to chronic depressed mood in the hospital and suicidality. Psychiatry has followed the patient for much of his hospitalization. While still experiencing some depressed mood, the patient's mood has improved and stabilized, with the patient demonstrating greater interpersonal connectedness, less irritability, and reduced suicidality. Patient's past statements of wanting to die appear to be triggered by moments of high distress due to pain and frustration with lack of improvement in his physical state and not actual suicidal statements. The patient presents as stable without any report of SHIIPAVH. No evidence of tyler, psychosis, or aggression. The patient does not appear to be at imminent risk of harm to self or others and does not require inpatient psychiatric hospitalization. No psychiatric contraindications to discharge.     :::Recommendations:::  - 1:1 not needed; low suicide or homicide risk  - Contact CL with questions/concerns

## 2023-03-08 NOTE — BH CONSULTATION LIAISON PROGRESS NOTE - NSICDXBHTERTIARYDX_PSY_ALL_CORE
R/O Single major depressive episode, moderate   F32.1  

## 2023-03-09 NOTE — PROGRESS NOTE ADULT - ATTENDING COMMENTS
Now s/p closure of diverting loop ileostomy and drainage of intraabdominal abscess  Overnight tachycardic, febrile, episode of emesis, NG tube placed with subsequent improvement  At time of evaluation today afebrile, hemodynamically stable, HR 90's, NG tube in place, abdomen soft, appropriately tender, dressing in place, minimal distension.  Follow up OR cultures from intraabdominal abscess, antibiotics per ID  Continue NPO, NG tube, IVF  Ok to start hep gtt anticoagulation  Awaiting return of bowel function

## 2023-03-09 NOTE — PROGRESS NOTE ADULT - SUBJECTIVE AND OBJECTIVE BOX
SUBJECTIVE: Patient seen and evaluated. Febrile overnight and NGT placed. No complaints related to LLE amputation site.         MEDICATIONS  (STANDING):  aMIOdarone    Tablet 100 milliGRAM(s) Oral every 24 hours  ascorbic acid 500 milliGRAM(s) Oral daily  chlorhexidine 2% Cloths 1 Application(s) Topical daily  cholestyramine Powder (Sugar-Free) 2 Gram(s) Oral three times a day  ertapenem  IVPB 1000 milliGRAM(s) IV Intermittent every 24 hours  fat emulsion (Fish Oil and Plant Based) 20% Infusion 0.7764 Gm/kG/Day (20.8 mL/Hr) IV Continuous <Continuous>  heparin   Injectable 5000 Unit(s) SubCutaneous every 8 hours  influenza  Vaccine (HIGH DOSE) 0.7 milliLiter(s) IntraMuscular once  metoprolol succinate ER 25 milliGRAM(s) Oral daily  mirtazapine 30 milliGRAM(s) Oral at bedtime  multivitamin 1 Tablet(s) Oral daily  nystatin Powder 1 Application(s) Topical two times a day  pantoprazole  Injectable 40 milliGRAM(s) IV Push daily  Parenteral Nutrition - Adult 1 Each (83 mL/Hr) TPN Continuous <Continuous>  povidone iodine 10% Solution 1 Application(s) Topical daily  sodium chloride 0.9% lock flush 10 milliLiter(s) IV Push every 8 hours  zinc sulfate 220 milliGRAM(s) Oral daily    MEDICATIONS  (PRN):  acetaminophen     Tablet .. 650 milliGRAM(s) Oral every 6 hours PRN Temp greater or equal to 38C (100.4F), Mild Pain (1 - 3)  HYDROmorphone  Injectable 0.5 milliGRAM(s) IV Push every 4 hours PRN breakthrough pain  lidocaine   4% Patch 1 Patch Transdermal once PRN abd pain  melatonin 3 milliGRAM(s) Oral at bedtime PRN Insomnia  ondansetron Injectable 4 milliGRAM(s) IV Push once PRN Nausea and/or Vomiting  ondansetron Injectable 4 milliGRAM(s) IV Push every 6 hours PRN Nausea and/or Vomiting  oxyCODONE    IR 10 milliGRAM(s) Oral every 4 hours PRN Severe Pain (7 - 10)  oxyCODONE    IR 5 milliGRAM(s) Oral every 4 hours PRN Moderate Pain (4 - 6)  sodium chloride 0.9% lock flush 10 milliLiter(s) IV Push every 1 hour PRN Pre/post blood products, medications, blood draw, and to maintain line patency      Vital Signs Last 24 Hrs  T(C): 37.5 (09 Mar 2023 04:00), Max: 38.3 (08 Mar 2023 22:27)  T(F): 99.5 (09 Mar 2023 04:00), Max: 100.9 (08 Mar 2023 22:27)  HR: 106 (09 Mar 2023 03:55) (94 - 140)  BP: 130/77 (09 Mar 2023 03:55) (121/79 - 158/90)  BP(mean): 98 (09 Mar 2023 03:55) (96 - 115)  RR: 17 (09 Mar 2023 03:55) (17 - 18)  SpO2: 100% (09 Mar 2023 03:55) (100% - 100%)    Parameters below as of 09 Mar 2023 03:55  Patient On (Oxygen Delivery Method): room air        Physical Exam:  General: NAD, resting comfortably in bed  Pulmonary: Nonlabored breathing, no respiratory distress  Cardiovascular: NSR  Abdominal: soft, appropriate tenderness, NGT in place feculant  Extremities: LLE dressing cdi, changed, site without dehiscence or stigmata of infection    I&O's Summary    08 Mar 2023 07:01  -  09 Mar 2023 07:00  --------------------------------------------------------  IN: 2370.8 mL / OUT: 2700 mL / NET: -329.2 mL        LABS:                        8.9    14.39 )-----------( 533      ( 08 Mar 2023 05:30 )             28.3     03-08    139  |  101  |  13  ----------------------------<  135<H>  4.7   |  29  |  0.43<L>    Ca    8.6      08 Mar 2023 05:30  Phos  4.4     03-08  Mg     2.1     03-08      PT/INR - ( 07 Mar 2023 16:16 )   PT: 14.8 sec;   INR: 1.24          PTT - ( 07 Mar 2023 16:16 )  PTT:32.9 sec

## 2023-03-09 NOTE — PROGRESS NOTE ADULT - SUBJECTIVE AND OBJECTIVE BOX
STATUS POST:    12/11: Ex lap, SMA embolectomy, 80cm SBR, abthera vac  12/13: Second look, SBR  12/15: Ex-lap, no dead gut, DANIEL of discontinuous gut, loop ileostomy; EBL minimal, 1L crystalloid, UOP 150mL   12/30: LROENZO & Cardioversion on 12/30.   2/15: L AKA guillotine, EBL 10cc, IVF 400cc,   3/1: L AKA closure.   3/7: Ileostomy reversal. EBL 20, ,       Interval Events: Tachy to 140s, feb to 100.9, AXR wet read - dilated SB ileus vs obstruction. NGT placed w/150cc output w/relief, metop x1 2.5mg x1     SUBJECTIVE: Patient seen and examined bedside by chief resident. This morning packing changed with moderate pain. Otherwise patient appears at baseline. No return of bowel function at this time. No acute changes.    aMIOdarone    Tablet 100 milliGRAM(s) Oral every 24 hours  ertapenem  IVPB 1000 milliGRAM(s) IV Intermittent every 24 hours  heparin   Injectable 5000 Unit(s) SubCutaneous every 8 hours  metoprolol succinate ER 25 milliGRAM(s) Oral daily      Vital Signs Last 24 Hrs  T(C): 37.5 (09 Mar 2023 04:00), Max: 38.3 (08 Mar 2023 22:27)  T(F): 99.5 (09 Mar 2023 04:00), Max: 100.9 (08 Mar 2023 22:27)  HR: 106 (09 Mar 2023 03:55) (94 - 140)  BP: 130/77 (09 Mar 2023 03:55) (121/79 - 158/90)  BP(mean): 98 (09 Mar 2023 03:55) (96 - 115)  RR: 17 (09 Mar 2023 03:55) (17 - 18)  SpO2: 100% (09 Mar 2023 03:55) (100% - 100%)    Parameters below as of 09 Mar 2023 03:55  Patient On (Oxygen Delivery Method): room air      I&O's Detail    08 Mar 2023 07:01  -  09 Mar 2023 07:00  --------------------------------------------------------  IN:    Fat Emulsion (Fish Oil &amp; Plant Based) 20% Infusion: 228.8 mL    IV PiggyBack: 100 mL    IV PiggyBack: 50 mL    TPN (Total Parenteral Nutrition): 1992 mL  Total IN: 2370.8 mL    OUT:    Indwelling Catheter - Urethral (mL): 750 mL    Nasogastric/Oral tube (mL): 700 mL    Voided (mL): 1250 mL  Total OUT: 2700 mL    Total NET: -329.2 mL          General: NAD, resting comfortably in bed  C/V: NSR  Pulm: Nonlabored breathing, no respiratory distress  Abd: soft, mild TTP/mild distention, prior mildine incision c/d/i, prior ostomy packed w/WTD gauze  Extrem: WWP, no edema, SCDs in place        LABS:                        8.9    14.39 )-----------( 533      ( 08 Mar 2023 05:30 )             28.3     03-08    139  |  101  |  13  ----------------------------<  135<H>  4.7   |  29  |  0.43<L>    Ca    8.6      08 Mar 2023 05:30  Phos  4.4     03-08  Mg     2.1     03-08      PT/INR - ( 07 Mar 2023 16:16 )   PT: 14.8 sec;   INR: 1.24          PTT - ( 07 Mar 2023 16:16 )  PTT:32.9 sec      RADIOLOGY & ADDITIONAL STUDIES:

## 2023-03-09 NOTE — PROGRESS NOTE ADULT - SUBJECTIVE AND OBJECTIVE BOX
INTERVAL HPI/OVERNIGHT EVENTS:    Events noted.  Tmax: 100.9    CONSTITUTIONAL:  Negative fever or chills, feels well, good appetite  EYES:  Negative  blurry vision or double vision  CARDIOVASCULAR:  Negative for chest pain or palpitations  RESPIRATORY:  Negative for cough, wheezing, or SOB   GASTROINTESTINAL:  Negative for nausea, vomiting, diarrhea, constipation, or abdominal pain  GENITOURINARY:  Negative frequency, urgency or dysuria  NEUROLOGIC:  No headache, confusion, dizziness, lightheadedness      ANTIBIOTICS/RELEVANT:    MEDICATIONS  (STANDING):  aMIOdarone    Tablet 100 milliGRAM(s) Oral every 24 hours  ascorbic acid 500 milliGRAM(s) Oral daily  chlorhexidine 2% Cloths 1 Application(s) Topical daily  cholestyramine Powder (Sugar-Free) 2 Gram(s) Oral three times a day  ertapenem  IVPB 1000 milliGRAM(s) IV Intermittent every 24 hours  fat emulsion (Fish Oil and Plant Based) 20% Infusion 0.7764 Gm/kG/Day (20.8 mL/Hr) IV Continuous <Continuous>  heparin  Infusion 700 Unit(s)/Hr (7 mL/Hr) IV Continuous <Continuous>  influenza  Vaccine (HIGH DOSE) 0.7 milliLiter(s) IntraMuscular once  metoprolol tartrate Injectable 5 milliGRAM(s) IV Push every 6 hours  mirtazapine 30 milliGRAM(s) Oral at bedtime  multivitamin 1 Tablet(s) Oral daily  nystatin Powder 1 Application(s) Topical two times a day  pantoprazole  Injectable 40 milliGRAM(s) IV Push daily  Parenteral Nutrition - Adult 1 Each (83 mL/Hr) TPN Continuous <Continuous>  Parenteral Nutrition - Adult 1 Each (83 mL/Hr) TPN Continuous <Continuous>  povidone iodine 10% Solution 1 Application(s) Topical daily  sodium chloride 0.9% lock flush 10 milliLiter(s) IV Push every 8 hours  zinc sulfate 220 milliGRAM(s) Oral daily    MEDICATIONS  (PRN):  acetaminophen     Tablet .. 650 milliGRAM(s) Oral every 6 hours PRN Temp greater or equal to 38C (100.4F), Mild Pain (1 - 3)  HYDROmorphone  Injectable 0.5 milliGRAM(s) IV Push every 4 hours PRN breakthrough pain  lidocaine   4% Patch 1 Patch Transdermal once PRN abd pain  melatonin 3 milliGRAM(s) Oral at bedtime PRN Insomnia  ondansetron Injectable 4 milliGRAM(s) IV Push once PRN Nausea and/or Vomiting  ondansetron Injectable 4 milliGRAM(s) IV Push every 6 hours PRN Nausea and/or Vomiting  sodium chloride 0.9% lock flush 10 milliLiter(s) IV Push every 1 hour PRN Pre/post blood products, medications, blood draw, and to maintain line patency        Vital Signs Last 24 Hrs  T(C): 37.3 (09 Mar 2023 14:06), Max: 38.3 (08 Mar 2023 22:27)  T(F): 99.2 (09 Mar 2023 14:06), Max: 100.9 (08 Mar 2023 22:27)  HR: 106 (09 Mar 2023 11:21) (102 - 140)  BP: 126/72 (09 Mar 2023 11:21) (126/72 - 158/90)  BP(mean): 94 (09 Mar 2023 11:21) (94 - 115)  RR: 18 (09 Mar 2023 11:21) (17 - 18)  SpO2: 94% (09 Mar 2023 11:21) (94% - 100%)    Parameters below as of 09 Mar 2023 11:21  Patient On (Oxygen Delivery Method): room air        PHYSICAL EXAM:  Constitutional:  frail, cachectic   Eyes:JOHN, EOMI  Ear/Nose/Throat: no oral lesion, no sinus tenderness on percussion	  Neck  supple  Respiratory: CTA keerthi  Cardiovascular: S1S2 RRR, no murmurs  Gastrointestinal:soft, (+) BS, no HSM  Extremities:no e/e/c  Vascular: DP Pulse:	right normal; left normal      LABS:                        8.6    17.54 )-----------( 771      ( 09 Mar 2023 10:35 )             26.9     03-09    134<L>  |  95<L>  |  11  ----------------------------<  138<H>  4.1   |  30  |  0.43<L>    Ca    9.0      09 Mar 2023 10:35  Phos  3.6     03-09  Mg     2.0     03-09      PT/INR - ( 07 Mar 2023 16:16 )   PT: 14.8 sec;   INR: 1.24          PTT - ( 07 Mar 2023 16:16 )  PTT:32.9 sec      MICROBIOLOGY:    Culture - Blood (03.08.23 @ 12:21)    Specimen Source: .Blood Blood-Venous    Culture Results:   No growth at 12 hours        Culture - Body Fluid with Gram Stain (03.07.23 @ 14:17)    Gram Stain:   No organisms seen  No WBC's seen.    Specimen Source: .Body Fluid Intraabdominal Fluid Collection    Culture Results:   Rare Klebsiella pneumoniae  Susceptibility to follow.  Rare Candida albicans    RADIOLOGY & ADDITIONAL STUDIES:

## 2023-03-09 NOTE — PROGRESS NOTE ADULT - ASSESSMENT
77M first presented to [facility] from [facility] due to unresponsiveness (responded to Narcan). At [facility], found to have subsegmental pulmonary embolism in RUL, rapid atrial flutter with severely reduced LVEF with LV thrombus. Transferred to [facility] on 12/7/22 for LORENZO and possible ablation. At [facility], was found to have left leg ischemia (left leg arterial thrombus on LE duplex on 12/8). Was being considered for rhythm control when he developed abdominal pain, CTA (12/10/22) showed embolus in SMA, bowel infarct, requiring ex-lap on 12/11 with SMA embolectomy, 80cm small bowel resection; On 12/13, underwent 2nd look laparotomy, with 80cm small bowel resection, closure with abthera. On 12/15, underwent 3rd look laparotomy, end-to-end anastomosis of remaining bowel, loop ileostomy and abdominal closure. Eventually underwent LORENZO/DCCV on 12/30/22, now in sinus rhythm. More recently found to have k. 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     #bacteremia - k pneumoniae, VRE facium suspected from abdominal abscess  #post op state  3.7   #sepsis  - SIRs 3/4; HR > 90, wbc> 12k, temp > 100.4   - worsening leukocytosis w wbc 17k   - recent blood cultures from 3/1 without any growth, continue with daptomycin and ertapenem, 3/8 repeat b cx's show NGTD, s/p OR w washout 3/7-> remains febrile w temp > 100.4   - continue to monitor for fever - appreciate ongoing ID input  - 3/7 ostomy reversal    #s/p left AKA  - s/p OR with vascular for wound closure     #anemia of chronic disease  - hg 7.9 after 1 prbc - ->8.4 --7.9-8.9--8.6  - no e/o active bleeding  - asymptomatic  - transfusion goal > 7     #Suicidal Ideation  #Insomnia   - Continue mirtazapine 30mg QHS   - psychiatry following, and appreciate any input    # HFrEF, not in exacerbation   - TTE from 2/11 reviewed, normal LVEF   - Metoprolol succinate 25mg qd - continue  - not volume overloaded.  cont to hold entresto and spironolactone.      # Atrial Flutter  - s/p DCCV 12/30  - EP recs - uninterrupted AC x 30 days ; January 29th was 30 days post DCCV.   - was on full dose Lovenox- transitioned to heparin drip post op, cw amiodarone and metoprolol succinate    #hyponatremia  -nl Na 138      -# Pulmonary embolism (subsegmental RUL)   - After completion of AC for DCCV, would need to continue AC for PE - currently primary team holding lovenox post op     # Bowel ischemia - s/p SMA embolectomy; Small bowel resection  - ileostomy reversed in OR for 3/7  - plan of primary team  - continue loperamide and diphenoxylate atropine    # Severe protein-calorie malnutrition  # Sacral wound   - appreciate input from nutrition  - Wound care per nursing      #DVT ppx - hep drip

## 2023-03-09 NOTE — PROGRESS NOTE ADULT - ASSESSMENT
77M PMHx of IVDU found unresponsive at his nursing home, resolved after Narcan in the field and brought to [facility]. Found to have PE, atrial flutter, and large LV thrombus on echo. Transferred to [facility] for further management. Pt c/o abdominal pain on 12/10 CTA showing mid SMA with embolus. Abnormal distal small bowel loops and cecum with dilatation and pneumatosis suggesting infarcted bowel. Now s/p Ex lap, SMA embolectomy, 80cm SBR, abthera vac left in discontinuity (12/11). S/p second look (12/13), s/p OR for 3rd look, end-to-end anastomosis of remaining bowel, loop ileostomy and abdomen closure (12/15). S/p LORENZO and Cardioversion on 12/30 with cardiology. Patient was nutritionally optimized. 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 s/p L AKA closure (3/1), s/p ileostomy reversal (3/7).    -EKG for tachycardia will f/u  -NPO/NGT/PICC-TPN  -ID following: Erta( 2/13- ),  (Dapto 2/11- )    -pain/nausea control prn   -Holding spironolactone, entresto, amio, metorpolol  -SCDs, SQL 65BID.   -Dispo: ROWAN

## 2023-03-09 NOTE — PROGRESS NOTE ADULT - SUBJECTIVE AND OBJECTIVE BOX
Patient is a 78y old  Male who presents with a chief complaint of A flutter (09 Mar 2023 07:02)      INTERVAL HPI/OVERNIGHT EVENTS: continues to have fever of > 100.4     MEDICATIONS  (STANDING):  aMIOdarone    Tablet 100 milliGRAM(s) Oral every 24 hours  ascorbic acid 500 milliGRAM(s) Oral daily  chlorhexidine 2% Cloths 1 Application(s) Topical daily  cholestyramine Powder (Sugar-Free) 2 Gram(s) Oral three times a day  ertapenem  IVPB 1000 milliGRAM(s) IV Intermittent every 24 hours  heparin  Infusion 700 Unit(s)/Hr (7 mL/Hr) IV Continuous <Continuous>  influenza  Vaccine (HIGH DOSE) 0.7 milliLiter(s) IntraMuscular once  metoprolol tartrate Injectable 5 milliGRAM(s) IV Push every 6 hours  mirtazapine 30 milliGRAM(s) Oral at bedtime  multivitamin 1 Tablet(s) Oral daily  nystatin Powder 1 Application(s) Topical two times a day  pantoprazole  Injectable 40 milliGRAM(s) IV Push daily  Parenteral Nutrition - Adult 1 Each (83 mL/Hr) TPN Continuous <Continuous>  povidone iodine 10% Solution 1 Application(s) Topical daily  sodium chloride 0.9% lock flush 10 milliLiter(s) IV Push every 8 hours  zinc sulfate 220 milliGRAM(s) Oral daily    MEDICATIONS  (PRN):  acetaminophen     Tablet .. 650 milliGRAM(s) Oral every 6 hours PRN Temp greater or equal to 38C (100.4F), Mild Pain (1 - 3)  HYDROmorphone  Injectable 0.5 milliGRAM(s) IV Push every 4 hours PRN breakthrough pain  lidocaine   4% Patch 1 Patch Transdermal once PRN abd pain  melatonin 3 milliGRAM(s) Oral at bedtime PRN Insomnia  ondansetron Injectable 4 milliGRAM(s) IV Push every 6 hours PRN Nausea and/or Vomiting  ondansetron Injectable 4 milliGRAM(s) IV Push once PRN Nausea and/or Vomiting  sodium chloride 0.9% lock flush 10 milliLiter(s) IV Push every 1 hour PRN Pre/post blood products, medications, blood draw, and to maintain line patency      __________________________________________________  REVIEW OF SYSTEMS:    CONSTITUTIONAL: No fever,   EYES: no acute visual disturbances  NECK: No pain or stiffness  RESPIRATORY: No cough; No shortness of breath  CARDIOVASCULAR: No chest pain, no palpitations  GASTROINTESTINAL: + pain. No nausea or vomiting; No diarrhea   NEUROLOGICAL: No headache or numbness, no tremors  MUSCULOSKELETAL: No joint pain, no muscle pain  GENITOURINARY: no dysuria, no frequency, no hesitancy  PSYCHIATRY: no depression , no anxiety  ALL OTHER  ROS negative        Vital Signs Last 24 Hrs  T(C): 37.1 (09 Mar 2023 09:24), Max: 38.3 (08 Mar 2023 22:27)  T(F): 98.7 (09 Mar 2023 09:24), Max: 100.9 (08 Mar 2023 22:27)  HR: 102 (09 Mar 2023 08:13) (102 - 140)  BP: 133/72 (09 Mar 2023 08:13) (121/79 - 158/90)  BP(mean): 97 (09 Mar 2023 08:13) (96 - 115)  RR: 18 (09 Mar 2023 08:13) (17 - 18)  SpO2: 97% (09 Mar 2023 08:13) (97% - 100%)    Parameters below as of 09 Mar 2023 08:13  Patient On (Oxygen Delivery Method): room air        ________________________________________________  PHYSICAL EXAM:  GENERAL: NAD  HEENT: Normocephalic;  conjunctivae and sclerae clear; very dry mucous membranes;   NECK : supple  CHEST/LUNG: Clear to auscultation bilaterally with good air entry   HEART: S1 S2  regular; no murmurs, gallops or rubs  ABDOMEN: Soft, TTP, Nondistended; Bowel sounds present  EXTREMITIES: no cyanosis; no edema; no calf tenderness, L AKA   SKIN: un stageable sacral ulcer, RLE with big toe noted to have healing ulceration   NERVOUS SYSTEM:  Awake and alert; Oriented  to place, person; no new deficits    _________________________________________________  LABS:                        8.6    17.54 )-----------( 771      ( 09 Mar 2023 10:35 )             26.9     03-09    134<L>  |  95<L>  |  11  ----------------------------<  138<H>  4.1   |  30  |  0.43<L>    Ca    9.0      09 Mar 2023 10:35  Phos  3.6     03-09  Mg     2.0     03-09      PT/INR - ( 07 Mar 2023 16:16 )   PT: 14.8 sec;   INR: 1.24          PTT - ( 07 Mar 2023 16:16 )  PTT:32.9 sec    CAPILLARY BLOOD GLUCOSE            RADIOLOGY & ADDITIONAL TESTS:      Plan of care was discussed with patient and /or primary care giver; all questions and concerns were addressed and care was aligned with patient's wishes.

## 2023-03-09 NOTE — PROGRESS NOTE ADULT - ASSESSMENT
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  Patient afebrile since drainage.  Fluid cultures from OR with Klebsiella suggesting this is the source of the bacteremia     Now culture with Candida albicans.  Given that this was from OR and he continues to have fever and leukocytosis, recommend treating candida albicans     Recommend:  1.  Continue Daptomycin 500 mg IV daily  2.  Continue Ertapenem 1 gram IV daily  3. Check EKG.  If QTc < 500, can start Fluconazole 400 mg PO/IV daily.  If QTc > 500 or EKG not done, can start Caspofungin 70 mg IV x 1, followed by 50 mg IV daily to cover Candida albicans   4.  Follow up OR cultures    ID team 2 will follow

## 2023-03-09 NOTE — PROGRESS NOTE ADULT - ASSESSMENT
78M with PMHx of IVDU found unresponsive at his nursing home, resolved after Narcan in the field and brought to [facility]. Found to have PE, atrial flutter, and large LV thrombus on ECHOand CTA showing mid SMA with embolus s/p Ex lap, SMA embolectomy, 80cm SBR, abthera vac left in discontinuity (12/11) and transferred to SICU intubated. S/p second look (12/13) and most recently s/p OR for 3rd look, end-to-end anastomosis of remaining bowel, loop ileostomy and abdomen closure (12/15). Prev stepped down to telemetry. LLE ischemia, AKA delayed by nutritional optimization. Transferred to SICU in the setting of sepsis for HD monitoring. 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 Pt w/ resolving tachycardia since OR and transient WBC, surgical site clean and intact w/o signs of infection. Febrile overnight with NGT replaced. LLE site does not appear clinically source of sepsis    - Local wound care- xeroform, wrap with kerlix and secure with ace wrap daily, changed today  - Remainder of care per primary team  - Vascular will continue to follow call [phone] with any questions or concerns

## 2023-03-10 NOTE — PROGRESS NOTE ADULT - ASSESSMENT
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  Patient afebrile since drainage.  Fluid cultures from OR with Klebsiella suggesting this is the source of the bacteremia     Now culture with Candida albicans.  Given that this was from OR and he continues to have fever and leukocytosis, recommend treating candida albicans     Recommend:  1.  Continue Daptomycin 500 mg IV daily  2.  Continue Ertapenem 1 gram IV daily  3.   QTc 487.  Continue Fluconazole 400 mg IV daily.  Check EKG in AM on 3/11/23.  If > 500, will need to stop Fluconazole and start Caspofungin     ID team 2 will follow

## 2023-03-10 NOTE — PROGRESS NOTE ADULT - SUBJECTIVE AND OBJECTIVE BOX
Patient is a 78y old  Male who presents with a chief complaint of A flutter (10 Mar 2023 12:23)      INTERVAL HPI/OVERNIGHT EVENTS: has complaints of abd pain, low grade fever 100 overnight     MEDICATIONS  (STANDING):  aMIOdarone    Tablet 100 milliGRAM(s) Oral every 24 hours  ascorbic acid 500 milliGRAM(s) Oral daily  chlorhexidine 2% Cloths 1 Application(s) Topical daily  cholestyramine Powder (Sugar-Free) 2 Gram(s) Oral three times a day  DAPTOmycin IVPB 500 milliGRAM(s) IV Intermittent every 24 hours  fat emulsion (Fish Oil and Plant Based) 20% Infusion 0.7764 Gm/kG/Day (20.83 mL/Hr) IV Continuous <Continuous>  fluconAZOLE IVPB 400 milliGRAM(s) IV Intermittent every 24 hours  heparin  Infusion 1300 Unit(s)/Hr (14 mL/Hr) IV Continuous <Continuous>  influenza  Vaccine (HIGH DOSE) 0.7 milliLiter(s) IntraMuscular once  metoprolol tartrate Injectable 5 milliGRAM(s) IV Push every 6 hours  mirtazapine 30 milliGRAM(s) Oral at bedtime  multivitamin 1 Tablet(s) Oral daily  nystatin Powder 1 Application(s) Topical two times a day  pantoprazole  Injectable 40 milliGRAM(s) IV Push daily  Parenteral Nutrition - Adult 1 Each (83 mL/Hr) TPN Continuous <Continuous>  Parenteral Nutrition - Adult 1 Each (83 mL/Hr) TPN Continuous <Continuous>  povidone iodine 10% Solution 1 Application(s) Topical daily  sodium chloride 0.9% lock flush 10 milliLiter(s) IV Push every 8 hours  zinc sulfate 220 milliGRAM(s) Oral daily    MEDICATIONS  (PRN):  acetaminophen     Tablet .. 650 milliGRAM(s) Oral every 6 hours PRN Temp greater or equal to 38C (100.4F), Mild Pain (1 - 3)  lidocaine   4% Patch 1 Patch Transdermal once PRN abd pain  melatonin 3 milliGRAM(s) Oral at bedtime PRN Insomnia  ondansetron Injectable 4 milliGRAM(s) IV Push every 6 hours PRN Nausea and/or Vomiting  ondansetron Injectable 4 milliGRAM(s) IV Push once PRN Nausea and/or Vomiting  sodium chloride 0.9% lock flush 10 milliLiter(s) IV Push every 1 hour PRN Pre/post blood products, medications, blood draw, and to maintain line patency      __________________________________________________  REVIEW OF SYSTEMS:    CONSTITUTIONAL: No fever,   EYES: no acute visual disturbances  NECK: No pain or stiffness  RESPIRATORY: No cough; No shortness of breath  CARDIOVASCULAR: No chest pain, no palpitations  GASTROINTESTINAL: +pain. No nausea or vomiting; No diarrhea   NEUROLOGICAL: No headache or numbness, no tremors  MUSCULOSKELETAL: No joint pain, no muscle pain  GENITOURINARY: no dysuria, no frequency, no hesitancy  PSYCHIATRY: no depression , no anxiety  ALL OTHER  ROS negative        Vital Signs Last 24 Hrs  T(C): 36.7 (10 Mar 2023 09:15), Max: 37.8 (09 Mar 2023 21:44)  T(F): 98 (10 Mar 2023 09:15), Max: 100 (09 Mar 2023 21:44)  HR: 98 (10 Mar 2023 11:31) (98 - 102)  BP: 128/79 (10 Mar 2023 11:31) (108/80 - 128/79)  BP(mean): 97 (10 Mar 2023 11:31) (84 - 97)  RR: 17 (10 Mar 2023 11:31) (17 - 18)  SpO2: 97% (10 Mar 2023 11:31) (92% - 97%)    Parameters below as of 10 Mar 2023 11:31  Patient On (Oxygen Delivery Method): room air        ________________________________________________  PHYSICAL EXAM:  GENERAL: NAD  HEENT: Normocephalic;  conjunctivae and sclerae clear; very dry mucous membranes; NGT   NECK : supple  CHEST/LUNG: Clear to auscultation bilaterally with good air entry   HEART: S1 S2  regular; no murmurs, gallops or rubs  ABDOMEN: Soft, TTP, Nondistended; Bowel sounds present, ostomy reversal site w incisions clean and dry   EXTREMITIES: no cyanosis; no edema; no calf tenderness, L AKA   SKIN: un stageable sacral ulcer, RLE with big toe noted to have healing ulceration   NERVOUS SYSTEM:  Awake and alert; Oriented  to place, person; no new deficits    _________________________________________________  LABS:                        8.0    13.98 )-----------( 718      ( 10 Mar 2023 03:11 )             24.5     03-10    133<L>  |  97  |  12  ----------------------------<  128<H>  4.1   |  26  |  0.45<L>    Ca    8.2<L>      10 Mar 2023 03:11  Phos  3.8     03-10  Mg     2.0     03-10      PT/INR - ( 10 Mar 2023 12:29 )   PT: 15.0 sec;   INR: 1.26          PTT - ( 10 Mar 2023 12:29 )  PTT:40.5 sec    CAPILLARY BLOOD GLUCOSE            RADIOLOGY & ADDITIONAL TESTS:      Plan of care was discussed with patient and /or primary care giver; all questions and concerns were addressed and care was aligned with patient's wishes.

## 2023-03-10 NOTE — PROGRESS NOTE ADULT - ASSESSMENT
77M PMHx of IVDU found unresponsive at his nursing home, resolved after Narcan in the field and brought to [facility]. Found to have PE, atrial flutter, and large LV thrombus on echo. Transferred to [facility] for further management. Pt c/o abdominal pain on 12/10 CTA showing mid SMA with embolus. Abnormal distal small bowel loops and cecum with dilatation and pneumatosis suggesting infarcted bowel. Now s/p Ex lap, SMA embolectomy, 80cm SBR, abthera vac left in discontinuity (12/11). S/p second look (12/13), s/p OR for 3rd look, end-to-end anastomosis of remaining bowel, loop ileostomy and abdomen closure (12/15). S/p LORENZO and Cardioversion on 12/30 with cardiology. Patient was nutritionally optimized. 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 s/p L AKA closure (3/1), s/p ileostomy reversal (3/7).    -EKG for fluc vs caspo  -NPO/NGT  -PICC/TPN  -Hgtt  -ID following: Erta( 2/13- ),  (Dapto 2/11- ) , (Fluc 3/9- )  -pain/nausea control prn   -Holding spironolactone, entresto, metoprolol, amiodarone  -SCDs, SQL 65BID.   -Dispo: ROWAN

## 2023-03-10 NOTE — CHART NOTE - NSCHARTNOTEFT_GEN_A_CORE
Admitting Diagnosis:   Patient is a 78y old  Male who presents with a chief complaint of A flutter (10 Mar 2023 13:32)    Current Nutrition Order:  NPO diet with NGT to LIWS  TPN via central line:  275g Dex, 90g AA, 50g 20% SMOF Lipids to provide in total 1795 Kcal, 90g protein, GIR 2.98, 1.4g/kg ABW protein    PO Intake: Good (%) [   ]  Fair (50-75%) [   ] Poor (<25%) [   ]    GI Issues:   WDL  No n/v/c noted +diarrhea noted  Ileostomy now reversed  No abdominal discomfort or distention noted  NGT to LIWS    Pain:   7/10 left leg and lower back pain noted    Skin Integrity:  Surgical incision noted, Buddy: 13  New left AKA as of 2/15  No edema noted  pressure ulcer: sacral spine stage III pressure ulcer, unstageable upper back pressure ulcer    Labs:   03-10    133<L>  |  97  |  12  ----------------------------<  128<H>  4.1   |  26  |  0.45<L>    Ca    8.2<L>      10 Mar 2023 03:11  Phos  3.8     03-10  Mg     2.0     03-10    Medications:  MEDICATIONS  (STANDING):  aMIOdarone    Tablet 100 milliGRAM(s) Oral every 24 hours  ascorbic acid 500 milliGRAM(s) Oral daily  chlorhexidine 2% Cloths 1 Application(s) Topical daily  cholestyramine Powder (Sugar-Free) 2 Gram(s) Oral three times a day  DAPTOmycin IVPB 500 milliGRAM(s) IV Intermittent every 24 hours  fat emulsion (Fish Oil and Plant Based) 20% Infusion 0.7764 Gm/kG/Day (20.83 mL/Hr) IV Continuous <Continuous>  fluconAZOLE IVPB 400 milliGRAM(s) IV Intermittent every 24 hours  heparin  Infusion 1300 Unit(s)/Hr (14 mL/Hr) IV Continuous <Continuous>  influenza  Vaccine (HIGH DOSE) 0.7 milliLiter(s) IntraMuscular once  metoprolol tartrate Injectable 5 milliGRAM(s) IV Push every 6 hours  mirtazapine 30 milliGRAM(s) Oral at bedtime  multivitamin 1 Tablet(s) Oral daily  nystatin Powder 1 Application(s) Topical two times a day  pantoprazole  Injectable 40 milliGRAM(s) IV Push daily  Parenteral Nutrition - Adult 1 Each (83 mL/Hr) TPN Continuous <Continuous>  Parenteral Nutrition - Adult 1 Each (83 mL/Hr) TPN Continuous <Continuous>  povidone iodine 10% Solution 1 Application(s) Topical daily  sodium chloride 0.9% lock flush 10 milliLiter(s) IV Push every 8 hours  zinc sulfate 220 milliGRAM(s) Oral daily    MEDICATIONS  (PRN):  acetaminophen     Tablet .. 650 milliGRAM(s) Oral every 6 hours PRN Temp greater or equal to 38C (100.4F), Mild Pain (1 - 3)  lidocaine   4% Patch 1 Patch Transdermal once PRN abd pain  melatonin 3 milliGRAM(s) Oral at bedtime PRN Insomnia  ondansetron Injectable 4 milliGRAM(s) IV Push every 6 hours PRN Nausea and/or Vomiting  ondansetron Injectable 4 milliGRAM(s) IV Push once PRN Nausea and/or Vomiting  sodium chloride 0.9% lock flush 10 milliLiter(s) IV Push every 1 hour PRN Pre/post blood products, medications, blood draw, and to maintain line patency    Admitting Anthropometrics:  6  pounds +-10%  Wt 142 pounds BMI 16.4 %IBW=66%   New adjusted IBW (w/ L AKA): 178lbs/ 81.3kg     Weight Change:   2/15 141.7 pounds   141.9 pounds - dosing wt    136 pounds - Bedscale wt   2/15 142lbs    **PCM:  >> Reports having 1-2 meals/day + ONS. Per pt claims to have 2 ensure/day and 2 nutrament/day - unclear how accurate this is. ?If pt meeting ~75% EER consistently.  >> Does report wt loss.  pounds x6 months ago. Thinks he now is 135 pounds which is consistent with bedscale wt obtained during initial visit by  pounds. Suggest wt loss of 49 pounds/26% body wt loss.  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     Estimated energy needs:  ABW used for calculations as pt between % of IBW (80%) Adjust for age, malnutrition, EF, S/p OR, Pressure ulcer; fluids per team or as recommended below  30-35kcal/k-2275kcal/day   1.4-1.6gm/k-104gm prot/day   30-35kcal/k-2275kcal/day   **Rec upper end of needs     Subjective:   77M with PMHx of IVDU found unresponsive at his nursing home, resolved after Narcan in the field and brought to [facility]. Found to have PE, atrial flutter, and large LV thrombus on echo. Transferred to [facility] for further management. Pt c/o abdominal pain on 12/10 CTA showing mid SMA with embolus. Abnormal distal small bowel loops and cecum with dilatation and pneumatosis suggesting infarcted bowel. One or two tiny foci of intrahepatic portal vein pneumatosis. Segmental infarction upper pole left kidney. Now s/p Ex lap, SMA embolectomy, 80cm SBR, abthera vac left in discontinuity () and transferred to SICU intubated. S/p second look () and most recently s/p OR for 3rd look, end-to-end anastomosis of remaining bowel, loop ileostomy and abdomen closure (12/15). Transferred to CCU on  for cardiac optimization and now transferred back to SICU  for bleeding hemodynamic instability. Echo  shows EF 10-15% with overall hypokinesis. CTA performed demonstrating large hematoma in R abdomen, new hemoperitoneum, and bilateral pleural effusions. Remains in SICU, now extubated with still with limb ischemia to LLE pending amputation and AC held given BRBPR and hematoma; noted pt agreeable for AKA when feasible - AKA currently Pending Cards. Pt s/p DCCV on  (negative LORENZO on ) with successful conversion to NSR. Planning for AKA with vascular surgery once nutrition status is optimize. Team placed PICC 1/10 and initiated supplemental TPN now weaned off with constant encouragement of PO intake. CT A/P  showing RLQ fluid collection. PPN held 2/2 bacteremia. More recently found to have k. 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 PICC placed  now plan to restart TPN. S/p RTOR for L AKA closure 3/1. S/p ileostomy reversal (3/7).    On assessment, pt resting in bed. Pt currently NPO with NGT to LIWS and TPN running at goal rate meeting 100% of est needs. Ostomy now reversed, cont to monitor for large amounts of diarrhea due to ongoing malabsorption. No n/v/c/d noted. RD to follow.     Prior PES: Malnutrition Severe in Chronic state RT presumed intake<EER AEB NFPE, wt loss, PO intake  >>on going  Goal: Pt will meet at least 75% of nutrient needs via feasible route / Show no further s/s of malnutrition    Recommendations:  1. NPO diet  >> Recommend advance to Low fiber diet with Ensure Max TID (450 kcal, 90g pro) when medically feasible  2. Due to persistent malabsorption, recommend resuming TPN as medically feasible/appropriate. Recommend; 275g Dex, 90g AA, 50g SMOF lipids to provide 1795 kcal, 90g pro, GIR 2.93, 1.38 g/kg pro ABW. RD to follow.  >> Cont to trend TG weekly  3. Recommend continue MVI daily  4. Monitor Skin, Wts daily, GOC. Pain/GI per team.   >>Cont with opium tincture, imodium and metamucil per team  5. Monitor lytes and replete prn. POC BG q6hrs     Education:  Disc likely diet advancement pending clinical status    Risk Level: High [ X ] Moderate [   ] Low [   ].

## 2023-03-10 NOTE — PROGRESS NOTE ADULT - SUBJECTIVE AND OBJECTIVE BOX
INTERVAL HPI/OVERNIGHT EVENTS:    Patient was seen and examined at bedside.  + low grade fever     CONSTITUTIONAL:  Negative fever or chills, feels well, good appetite  EYES:  Negative  blurry vision or double vision  CARDIOVASCULAR:  Negative for chest pain or palpitations  RESPIRATORY:  Negative for cough, wheezing, or SOB   GASTROINTESTINAL:  Negative for nausea, vomiting, diarrhea, constipation, or abdominal pain  GENITOURINARY:  Negative frequency, urgency or dysuria  NEUROLOGIC:  No headache, confusion, dizziness, lightheadedness      ANTIBIOTICS/RELEVANT:    MEDICATIONS  (STANDING):  aMIOdarone    Tablet 100 milliGRAM(s) Oral every 24 hours  ascorbic acid 500 milliGRAM(s) Oral daily  chlorhexidine 2% Cloths 1 Application(s) Topical daily  cholestyramine Powder (Sugar-Free) 2 Gram(s) Oral three times a day  DAPTOmycin IVPB 500 milliGRAM(s) IV Intermittent every 24 hours  fat emulsion (Fish Oil and Plant Based) 20% Infusion 0.7764 Gm/kG/Day (20.8 mL/Hr) IV Continuous <Continuous>  fluconAZOLE IVPB 400 milliGRAM(s) IV Intermittent every 24 hours  heparin  Infusion 1300 Unit(s)/Hr (13 mL/Hr) IV Continuous <Continuous>  influenza  Vaccine (HIGH DOSE) 0.7 milliLiter(s) IntraMuscular once  metoprolol tartrate Injectable 5 milliGRAM(s) IV Push every 6 hours  mirtazapine 30 milliGRAM(s) Oral at bedtime  multivitamin 1 Tablet(s) Oral daily  nystatin Powder 1 Application(s) Topical two times a day  pantoprazole  Injectable 40 milliGRAM(s) IV Push daily  Parenteral Nutrition - Adult 1 Each (83 mL/Hr) TPN Continuous <Continuous>  Parenteral Nutrition - Adult 1 Each (83 mL/Hr) TPN Continuous <Continuous>  povidone iodine 10% Solution 1 Application(s) Topical daily  sodium chloride 0.9% lock flush 10 milliLiter(s) IV Push every 8 hours  zinc sulfate 220 milliGRAM(s) Oral daily    MEDICATIONS  (PRN):  acetaminophen     Tablet .. 650 milliGRAM(s) Oral every 6 hours PRN Temp greater or equal to 38C (100.4F), Mild Pain (1 - 3)  lidocaine   4% Patch 1 Patch Transdermal once PRN abd pain  melatonin 3 milliGRAM(s) Oral at bedtime PRN Insomnia  ondansetron Injectable 4 milliGRAM(s) IV Push once PRN Nausea and/or Vomiting  ondansetron Injectable 4 milliGRAM(s) IV Push every 6 hours PRN Nausea and/or Vomiting  sodium chloride 0.9% lock flush 10 milliLiter(s) IV Push every 1 hour PRN Pre/post blood products, medications, blood draw, and to maintain line patency        Vital Signs Last 24 Hrs  T(C): 36.7 (10 Mar 2023 09:15), Max: 37.8 (09 Mar 2023 21:44)  T(F): 98 (10 Mar 2023 09:15), Max: 100 (09 Mar 2023 21:44)  HR: 98 (10 Mar 2023 11:31) (98 - 102)  BP: 128/79 (10 Mar 2023 11:31) (108/80 - 128/79)  BP(mean): 97 (10 Mar 2023 11:31) (84 - 97)  RR: 17 (10 Mar 2023 11:31) (17 - 18)  SpO2: 97% (10 Mar 2023 11:31) (92% - 97%)    Parameters below as of 10 Mar 2023 11:31  Patient On (Oxygen Delivery Method): room air        PHYSICAL EXAM:  Constitutional:  chronically ill appearing, cachectic   Eyes:  no icterus   Ear/Nose/Throat: no oral lesion, no sinus tenderness on percussion	  Neck:  supple  Respiratory: CTA keerthi  Cardiovascular: S1S2 RRR, no murmurs  Gastrointestinal:soft, (+) BS, no HSM  Extremities:no edema   Vascular: DP Pulse:	right normal; left normal      LABS:                        8.0    13.98 )-----------( 718      ( 10 Mar 2023 03:11 )             24.5     03-10    133<L>  |  97  |  12  ----------------------------<  128<H>  4.1   |  26  |  0.45<L>    Ca    8.2<L>      10 Mar 2023 03:11  Phos  3.8     03-10  Mg     2.0     03-10      PTT - ( 10 Mar 2023 03:11 )  PTT:36.4 sec      MICROBIOLOGY:    Culture - Body Fluid with Gram Stain (03.07.23 @ 14:17)    -  Ciprofloxacin: R >2    Gram Stain:   No organisms seen  No WBC's seen.    -  Ampicillin: R >16 These ampicillin results predict results for amoxicillin    -  Ampicillin/Sulbactam: R >16/8 Enterobacter, Klebsiella aerogenes, Citrobacter, and Serratia may develop resistance during prolonged therapy (3-4 days)    -  Cefazolin: R >16 Enterobacter, Klebsiella aerogenes, Citrobacter, and Serratia may develop resistance during prolonged therapy (3-4 days)    -  Ceftriaxone: R >32 Enterobacter, Klebsiella aerogenes, Citrobacter, and Serratia may develop resistance during prolonged therapy    -  Ertapenem: S <=0.5    -  Gentamicin: S <=2    -  Meropenem: S <=1    -  Meropenem: S 1    -  Piperacillin/Tazobactam: R >64    -  Tobramycin: S <=2    -  Trimethoprim/Sulfamethoxazole: R >2/38    Specimen Source: .Body Fluid Intraabdominal Fluid Collection    Culture Results:   Rare Klebsiella pneumoniae ESBL  Susceptibility to follow.  Rare Candida albicans    Organism Identification: Klebsiella pneumoniae ESBL  Klebsiella pneumoniae ESBL    Organism: Klebsiella pneumoniae ESBL    Organism: Klebsiella pneumoniae ESBL    Method Type: SOWMYA    Method Type: ETEST      Culture - Blood (03.08.23 @ 12:21)    Specimen Source: .Blood Blood-Venous    Culture Results:   No growth at 1 day.      RADIOLOGY & ADDITIONAL STUDIES:

## 2023-03-10 NOTE — PROGRESS NOTE ADULT - SUBJECTIVE AND OBJECTIVE BOX
SUBJECTIVE: Patient seen and evaluated. No specific complaints this AM. Pain is well controlled. Tolerating diet.        MEDICATIONS  (STANDING):  aMIOdarone    Tablet 100 milliGRAM(s) Oral every 24 hours  ascorbic acid 500 milliGRAM(s) Oral daily  chlorhexidine 2% Cloths 1 Application(s) Topical daily  cholestyramine Powder (Sugar-Free) 2 Gram(s) Oral three times a day  DAPTOmycin IVPB 500 milliGRAM(s) IV Intermittent every 24 hours  fat emulsion (Fish Oil and Plant Based) 20% Infusion 0.7764 Gm/kG/Day (20.83 mL/Hr) IV Continuous <Continuous>  fluconAZOLE IVPB 400 milliGRAM(s) IV Intermittent every 24 hours  heparin  Infusion 1300 Unit(s)/Hr (14 mL/Hr) IV Continuous <Continuous>  influenza  Vaccine (HIGH DOSE) 0.7 milliLiter(s) IntraMuscular once  metoprolol tartrate Injectable 5 milliGRAM(s) IV Push every 6 hours  mirtazapine 30 milliGRAM(s) Oral at bedtime  multivitamin 1 Tablet(s) Oral daily  nystatin Powder 1 Application(s) Topical two times a day  pantoprazole  Injectable 40 milliGRAM(s) IV Push daily  Parenteral Nutrition - Adult 1 Each (83 mL/Hr) TPN Continuous <Continuous>  Parenteral Nutrition - Adult 1 Each (83 mL/Hr) TPN Continuous <Continuous>  povidone iodine 10% Solution 1 Application(s) Topical daily  sodium chloride 0.9% lock flush 10 milliLiter(s) IV Push every 8 hours  zinc sulfate 220 milliGRAM(s) Oral daily    MEDICATIONS  (PRN):  acetaminophen     Tablet .. 650 milliGRAM(s) Oral every 6 hours PRN Temp greater or equal to 38C (100.4F), Mild Pain (1 - 3)  lidocaine   4% Patch 1 Patch Transdermal once PRN abd pain  melatonin 3 milliGRAM(s) Oral at bedtime PRN Insomnia  ondansetron Injectable 4 milliGRAM(s) IV Push once PRN Nausea and/or Vomiting  ondansetron Injectable 4 milliGRAM(s) IV Push every 6 hours PRN Nausea and/or Vomiting  sodium chloride 0.9% lock flush 10 milliLiter(s) IV Push every 1 hour PRN Pre/post blood products, medications, blood draw, and to maintain line patency      Vital Signs Last 24 Hrs  T(C): 37 (10 Mar 2023 14:00), Max: 37.8 (09 Mar 2023 21:44)  T(F): 98.6 (10 Mar 2023 14:00), Max: 100 (09 Mar 2023 21:44)  HR: 98 (10 Mar 2023 11:31) (98 - 102)  BP: 128/79 (10 Mar 2023 11:31) (108/80 - 128/79)  BP(mean): 97 (10 Mar 2023 11:31) (84 - 97)  RR: 17 (10 Mar 2023 11:31) (17 - 18)  SpO2: 97% (10 Mar 2023 11:31) (92% - 97%)    Parameters below as of 10 Mar 2023 11:31  Patient On (Oxygen Delivery Method): room air        Physical Exam:  General: NAD, resting comfortably in bed  Pulmonary: Nonlabored breathing, no respiratory distress  Cardiovascular: NSR  Abdominal: soft, appropriate tenderness, NGT in place feculant  Extremities: LLE dressing cdi, changed, site without dehiscence or stigmata of infection    I&O's Summary    09 Mar 2023 07:01  -  10 Mar 2023 07:00  --------------------------------------------------------  IN: 1671.6 mL / OUT: 3350 mL / NET: -1678.4 mL    10 Mar 2023 07:01  -  10 Mar 2023 14:46  --------------------------------------------------------  IN: 233.6 mL / OUT: 700 mL / NET: -466.4 mL        LABS:                        8.0    13.98 )-----------( 718      ( 10 Mar 2023 03:11 )             24.5     03-10    133<L>  |  97  |  12  ----------------------------<  128<H>  4.1   |  26  |  0.45<L>    Ca    8.2<L>      10 Mar 2023 03:11  Phos  3.8     03-10  Mg     2.0     03-10      PT/INR - ( 10 Mar 2023 12:29 )   PT: 15.0 sec;   INR: 1.26          PTT - ( 10 Mar 2023 12:29 )  PTT:40.5 sec    CAPILLARY BLOOD GLUCOSE            RADIOLOGY & ADDITIONAL STUDIES:

## 2023-03-10 NOTE — PROGRESS NOTE ADULT - ASSESSMENT
78M with PMHx of IVDU found unresponsive at his nursing home, resolved after Narcan in the field and brought to [facility]. Found to have PE, atrial flutter, and large LV thrombus on ECHOand CTA showing mid SMA with embolus s/p Ex lap, SMA embolectomy, 80cm SBR, abthera vac left in discontinuity (12/11) and transferred to SICU intubated. S/p second look (12/13) and most recently s/p OR for 3rd look, end-to-end anastomosis of remaining bowel, loop ileostomy and abdomen closure (12/15). Prev stepped down to telemetry. LLE ischemia, AKA delayed by nutritional optimization. Transferred to SICU in the setting of sepsis for HD monitoring. 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 Pt w/ resolving tachycardia since OR and transient WBC, surgical site clean and intact w/o signs of infection. Febrile overnight with NGT replaced. LLE site does not appear clinically source of sepsis    - Local wound care- xeroform, wrap with kerlix and secure with ace wrap daily, changed today  - Remainder of care per primary team  - Vascular will continue to follow call [phone] with any questions or concerns

## 2023-03-10 NOTE — PROGRESS NOTE ADULT - ASSESSMENT
77M first presented to [facility] from [facility] due to unresponsiveness (responded to Narcan). At [facility], found to have subsegmental pulmonary embolism in RUL, rapid atrial flutter with severely reduced LVEF with LV thrombus. Transferred to [facility] on 12/7/22 for LORENZO and possible ablation. At [facility], was found to have left leg ischemia (left leg arterial thrombus on LE duplex on 12/8). Was being considered for rhythm control when he developed abdominal pain, CTA (12/10/22) showed embolus in SMA, bowel infarct, requiring ex-lap on 12/11 with SMA embolectomy, 80cm small bowel resection; On 12/13, underwent 2nd look laparotomy, with 80cm small bowel resection, closure with abthera. On 12/15, underwent 3rd look laparotomy, end-to-end anastomosis of remaining bowel, loop ileostomy and abdominal closure. Eventually underwent LORENZO/DCCV on 12/30/22, now in sinus rhythm. More recently found to have k. 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     #bacteremia - k pneumoniae, VRE facium suspected from abdominal abscess  #post op state  3.7   #sepsis  - SIRs 3/4; HR > 90, wbc> 12k, temp > 100.4   - leukocytosis w wbc 17k -> now trending down to 13k  - blood cultures from 3/1 without any growth, continue with daptomycin and ertapenem, 3/8 repeat b cx's show NGTD, s/p OR w washout 3/7-> intra op cultures now growing candida and klebs-> started on fluconazole 3/9-> check qtc qd ( 480 )   - continue to monitor for fever - appreciate ongoing ID input    # Bowel ischemia - s/p SMA embolectomy; Small bowel resection  - ileostomy reversed in OR for 3/7  - plan of primary team  - continue loperamide and diphenoxylate atropine  #s/p left AKA  - s/p OR with vascular for wound closure     #anemia of chronic disease  - hg 7.9 after 1 prbc - ->8.4 --7.9-8.9--8.6  - no e/o active bleeding  - asymptomatic  - transfusion goal > 7     #Suicidal Ideation  #Insomnia   - Continue mirtazapine 30mg QHS   - psychiatry following, and appreciate any input    # HFrEF, not in exacerbation   - TTE from 2/11 reviewed, normal LVEF   - Metoprolol succinate 25mg qd - continue  - not volume overloaded.  cont to hold entresto and spironolactone.      # Atrial Flutter  - s/p DCCV 12/30  - EP recs - uninterrupted AC x 30 days ; January 29th was 30 days post DCCV.   - was on full dose Lovenox- transitioned to heparin drip post op, cw amiodarone and metoprolol succinate    #hyponatremia  -nl Na 138      -# Pulmonary embolism (subsegmental RUL)   - After completion of AC for DCCV, would need to continue AC for PE - currently primary team holding lovenox post op       # Severe protein-calorie malnutrition  # Sacral wound   - appreciate input from nutrition  - Wound care per nursing      #DVT ppx - hep drip

## 2023-03-10 NOTE — PROGRESS NOTE ADULT - SUBJECTIVE AND OBJECTIVE BOX
STATUS POST: Procedure/ Surgery:  12/11: Ex lap, SMA embolectomy, 80cm SBR, abthera vac  12/13: Second look, SBR  12/15: Ex-lap, no dead gut, DANIEL of discontinuous gut, loop ileostomy; EBL minimal, 1L crystalloid, UOP 150mL   12/30: LORENZO & Cardioversion on 12/30.   2/15: L AKA guillotine, EBL 10cc, IVF 400cc,   3/1: L AKA closure.   3/7: Ileostomy reversal. EBL 20, ,         SUBJECTIVE: Patient seen and examined bedside by chief resident. This AM patient laying in bed comfortably. Notes some tenderness at packing site but otherwise doing well.    aMIOdarone    Tablet 100 milliGRAM(s) Oral every 24 hours  DAPTOmycin IVPB 500 milliGRAM(s) IV Intermittent every 24 hours  ertapenem  IVPB 1000 milliGRAM(s) IV Intermittent every 24 hours  fluconAZOLE IVPB 400 milliGRAM(s) IV Intermittent every 24 hours  heparin  Infusion 1300 Unit(s)/Hr IV Continuous <Continuous>  metoprolol tartrate Injectable 5 milliGRAM(s) IV Push every 6 hours      Vital Signs Last 24 Hrs  T(C): 36.7 (10 Mar 2023 09:15), Max: 37.8 (09 Mar 2023 21:44)  T(F): 98 (10 Mar 2023 09:15), Max: 100 (09 Mar 2023 21:44)  HR: 102 (10 Mar 2023 08:35) (98 - 106)  BP: 114/77 (10 Mar 2023 08:35) (108/80 - 126/72)  BP(mean): 91 (10 Mar 2023 08:35) (84 - 94)  RR: 18 (10 Mar 2023 08:35) (17 - 18)  SpO2: 96% (10 Mar 2023 08:35) (92% - 96%)    Parameters below as of 10 Mar 2023 08:35  Patient On (Oxygen Delivery Method): room air      I&O's Detail    09 Mar 2023 07:01  -  10 Mar 2023 07:00  --------------------------------------------------------  IN:    Fat Emulsion (Fish Oil &amp; Plant Based) 20% Infusion: 249.6 mL    Heparin: 39 mL    Heparin: 7 mL    Heparin: 80 mL    IV PiggyBack: 300 mL    TPN (Total Parenteral Nutrition): 996 mL  Total IN: 1671.6 mL    OUT:    Nasogastric/Oral tube (mL): 400 mL    Voided (mL): 2950 mL  Total OUT: 3350 mL    Total NET: -1678.4 mL      10 Mar 2023 07:01  -  10 Mar 2023 11:07  --------------------------------------------------------  IN:    Fat Emulsion (Fish Oil &amp; Plant Based) 20% Infusion: 41.6 mL    Heparin: 26 mL    TPN (Total Parenteral Nutrition): 166 mL  Total IN: 233.6 mL    OUT:    Voided (mL): 700 mL  Total OUT: 700 mL    Total NET: -466.4 mL          General: NAD, resting comfortably in bed  C/V: NSR  Pulm: Nonlabored breathing, no respiratory distress  Abd: soft, NT/ND. prior ileostomy packed w/WTD gauze  Extrem: R offloading boot, L s/p AKA        LABS:                        8.0    13.98 )-----------( 718      ( 10 Mar 2023 03:11 )             24.5     03-10    133<L>  |  97  |  12  ----------------------------<  128<H>  4.1   |  26  |  0.45<L>    Ca    8.2<L>      10 Mar 2023 03:11  Phos  3.8     03-10  Mg     2.0     03-10      PTT - ( 10 Mar 2023 03:11 )  PTT:36.4 sec      RADIOLOGY & ADDITIONAL STUDIES:

## 2023-03-11 NOTE — PROGRESS NOTE ADULT - SUBJECTIVE AND OBJECTIVE BOX
STATUS POST:  Embolectomy, artery, superior mesenteric    Exploratory laparotomy    Exploratory laparotomy with small bowel resection    Small bowel resection with anastomosis    Closure of fascia of abdomen    Insertion, needle, vein    Amputation above knee    Complex reversal of ileostomy      SUBJECTIVE:   Patient seen and examined on am rounds. Reporting worsening abdominal pain. -n-v-cp-sob.    Vital Signs Last 24 Hrs  T(C): 37.3 (11 Mar 2023 09:22), Max: 37.4 (10 Mar 2023 21:32)  T(F): 99.2 (11 Mar 2023 09:22), Max: 99.4 (10 Mar 2023 21:32)  HR: 94 (11 Mar 2023 08:30) (90 - 102)  BP: 148/88 (11 Mar 2023 08:30) (124/81 - 156/87)  BP(mean): 110 (11 Mar 2023 08:30) (97 - 120)  RR: 16 (11 Mar 2023 08:30) (16 - 18)  SpO2: 100% (11 Mar 2023 08:30) (93% - 100%)    Parameters below as of 11 Mar 2023 08:30  Patient On (Oxygen Delivery Method): room air        I&O's Summary    10 Mar 2023 07:01  -  11 Mar 2023 07:00  --------------------------------------------------------  IN: 2637.6 mL / OUT: 2050 mL / NET: 587.6 mL        Physical Exam:  General Appearance: Appears well, NAD  Pulmonary: Nonlabored breathing, no respiratory distress  Cardiovascular: NSR  Abdomen: Soft, nondistended, mildly tender x4. incision cdi, repacked. ngt in place.  Extremities: WWP, SCD's in place     LABS:                        9.4    17.25 )-----------( 871      ( 11 Mar 2023 05:30 )             29.9     03-11    134<L>  |  98  |  12  ----------------------------<  129<H>  4.1   |  26  |  0.44<L>    Ca    9.0      11 Mar 2023 05:30  Phos  3.6     03-11  Mg     2.1     03-11      PT/INR - ( 10 Mar 2023 20:18 )   PT: 15.1 sec;   INR: 1.27          PTT - ( 11 Mar 2023 05:30 )  PTT:76.7 sec

## 2023-03-11 NOTE — PROGRESS NOTE ADULT - SUBJECTIVE AND OBJECTIVE BOX
Pt seen and examined by me at bedside this AM  c/o intermittent abd pain,     VSS  cachetic ill appearing  +NGT  RUE PICC   decreased BS, TTP around LLQ ostomy dressing site  L AKA    labs reviewed    a/p:   #bacteremia - k pneumoniae, VRE facium suspected from abdominal abscess with intra op cultures +candida and klebs-> on fluconazole, daptomycin and ertapenem. recs as per ID  # Bowel ischemia - s/p SMA embolectomy; Small bowel resection  #s/p left AKA  #anemia of chronic disease  #Suicidal Ideation  #Insomnia   - Continue mirtazapine 30mg QHS   - psychiatry following, and appreciate any input    # HFrEF, euvolemic  - TTE from 2/11 reviewed, normal LVEF   - Metoprolol succinate 25mg qd     # Atrial Flutter  - s/p DCCV 12/30  - EP recs - uninterrupted AC x 30 days ; January 29th was 30 days post DCCV.   - was on full dose Lovenox- transitioned to heparin drip post op, cw amiodarone and metoprolol succinate    # Pulmonary embolism (subsegmental RUL): on AC      # Severe protein-calorie malnutrition  # Sacral wound   - appreciate input from nutrition  - Wound care per nursing      #DVT ppx - hep drip

## 2023-03-11 NOTE — PROGRESS NOTE ADULT - ASSESSMENT
77M PMHx of IVDU found unresponsive at his nursing home, resolved after Narcan in the field and brought to [facility]. Found to have PE, atrial flutter, and large LV thrombus on echo. Transferred to [facility] for further management. Pt c/o abdominal pain on 12/10 CTA showing mid SMA with embolus. Abnormal distal small bowel loops and cecum with dilatation and pneumatosis suggesting infarcted bowel. Now s/p Ex lap, SMA embolectomy, 80cm SBR, abthera vac left in discontinuity (12/11). S/p second look (12/13), s/p OR for 3rd look, end-to-end anastomosis of remaining bowel, loop ileostomy and abdomen closure (12/15). S/p LORENZO and Cardioversion on 12/30 with cardiology. Patient was nutritionally optimized. 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 s/p L AKA closure (3/1), s/p ileostomy reversal (3/7).    -CTAP oral via ngt/IV  -NPO/NGT  -PICC/TPN  -Hep gtt  -ID following: Erta( 2/13- ),  (Dapto 2/11- ) , (Fluc 3/9- )  -pain/nausea control prn   -Holding spironolactone, entresto, metoprolol, amiodarone  -SCDs, SQL 65BID.   -Dispo: ROWAN

## 2023-03-12 NOTE — PROGRESS NOTE ADULT - SUBJECTIVE AND OBJECTIVE BOX
SUBJECTIVE: Patient seen and examined bedside by surgery team. Patient denies any complaints this morning. -F/C/CP/SOB/N/V    aMIOdarone    Tablet 100 milliGRAM(s) Oral every 24 hours  DAPTOmycin IVPB 500 milliGRAM(s) IV Intermittent every 24 hours  fluconAZOLE IVPB 400 milliGRAM(s) IV Intermittent every 24 hours  heparin  Infusion 1500 Unit(s)/Hr IV Continuous <Continuous>  metoprolol tartrate Injectable 5 milliGRAM(s) IV Push every 6 hours      Vital Signs Last 24 Hrs  T(C): 36.7 (12 Mar 2023 05:01), Max: 37.3 (11 Mar 2023 09:22)  T(F): 98.1 (12 Mar 2023 05:01), Max: 99.2 (11 Mar 2023 09:22)  HR: 104 (12 Mar 2023 04:20) (94 - 116)  BP: 136/78 (12 Mar 2023 04:20) (117/72 - 148/88)  BP(mean): 101 (12 Mar 2023 04:20) (90 - 110)  RR: 17 (12 Mar 2023 04:20) (16 - 17)  SpO2: 95% (12 Mar 2023 04:20) (95% - 100%)    Parameters below as of 12 Mar 2023 04:20  Patient On (Oxygen Delivery Method): room air      I&O's Detail    10 Mar 2023 07:01  -  11 Mar 2023 07:00  --------------------------------------------------------  IN:    Fat Emulsion (Fish Oil &amp; Plant Based) 20% Infusion: 41.6 mL    Fat Emulsion (Fish Oil &amp; Plant Based) 20% Infusion: 208 mL    Heparin: 150 mL    Heparin: 179 mL    IV PiggyBack: 100 mL    IV PiggyBack: 50 mL    TPN (Total Parenteral Nutrition): 1909 mL  Total IN: 2637.6 mL    OUT:    Nasogastric/Oral tube (mL): 350 mL    Voided (mL): 1700 mL  Total OUT: 2050 mL    Total NET: 587.6 mL      11 Mar 2023 06:01  -  12 Mar 2023 06:36  --------------------------------------------------------  IN:    Fat Emulsion (Fish Oil &amp; Plant Based) 20% Infusion: 270.4 mL    Heparin: 345 mL    Lactated Ringers Bolus: 400 mL    TPN (Total Parenteral Nutrition): 1909 mL  Total IN: 2924.4 mL    OUT:    Nasogastric/Oral tube (mL): 1000 mL    Voided (mL): 1600 mL  Total OUT: 2600 mL    Total NET: 324.4 mL        General: NAD, resting comfortably in bed  C/V: NSR  Pulm: Nonlabored breathing, no respiratory distress  Abd: soft, NT/ND. prior ileostomy packed w/WTD gauze  Extrem: R offloading boot, L s/p AKA          LABS:                        9.4    17.25 )-----------( 871      ( 11 Mar 2023 05:30 )             29.9     03-11    134<L>  |  98  |  12  ----------------------------<  129<H>  4.1   |  26  |  0.44<L>    Ca    9.0      11 Mar 2023 05:30  Phos  3.6     03-11  Mg     2.1     03-11      PT/INR - ( 10 Mar 2023 20:18 )   PT: 15.1 sec;   INR: 1.27          PTT - ( 11 Mar 2023 18:22 )  PTT:59.3 sec      RADIOLOGY & ADDITIONAL STUDIES:

## 2023-03-12 NOTE — PROGRESS NOTE ADULT - SUBJECTIVE AND OBJECTIVE BOX
Pt seen and examined by me at bedside this AM  back on NGT suctioning    VSS  cachetic ill appearing  +NGT  RUE PICC   decreased BS, TTP around LLQ ostomy dressing site  L AKA    labs reviewed    a/p:   #bacteremia - k pneumoniae, VRE facium suspected from abdominal abscess with intra op cultures +candida and klebs-> on fluconazole, daptomycin and ertapenem. recs as per ID  # Bowel ischemia - s/p SMA embolectomy; Small bowel resection  #s/p left AKA  #anemia of chronic disease  #Suicidal Ideation  #Insomnia   - Continue mirtazapine 30mg QHS   - psychiatry following, and appreciate any input    # HFrEF, euvolemic  - TTE from 2/11 reviewed, normal LVEF   - Metoprolol succinate 25mg qd     # Atrial Flutter  - s/p DCCV 12/30  - EP recs - uninterrupted AC x 30 days ; January 29th was 30 days post DCCV.   - was on full dose Lovenox- transitioned to heparin drip post op, cw amiodarone and metoprolol succinate    # Pulmonary embolism (subsegmental RUL): on AC      # Severe protein-calorie malnutrition  # Sacral wound   - appreciate input from nutrition  - Wound care per nursing      #DVT ppx - hep drip

## 2023-03-12 NOTE — SBIRT NOTE ADULT - NSSBIRTDRGBRIEFINTDET_GEN_A_CORE
Pt. states he uses cocaine and heroin "occasionally" and "to get high with friends." He does not think his use is problematic. Pt. states he does not inject the cocaine or heroin. He snorts them. Pt. declined substance abuse resources.

## 2023-03-12 NOTE — PROGRESS NOTE ADULT - ASSESSMENT
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  Patient afebrile since drainage.  Fluid cultures from OR with Klebsiella suggesting this is the source of the bacteremia     Now culture with Candida albicans.  Given that this was from OR and he continues to have fever and leukocytosis, recommend treating candida albicans     He is afebrile but WBC continues to increase. Unclear etiology of this as we do not have a differential.  It's possibly reactive?  Could also be due to collection seen on most recent CT    Recommend:  1.  Continue Daptomycin 500 mg IV daily  2.  Continue Ertapenem 1 gram IV daily  3.   QTc 474.  Continue Fluconazole 400 mg IV daily.  Check EKG in AM on 3/13/23.  If > 500, will need to stop Fluconazole and start Caspofungin   4.  In AM on 3/13, check CBC with differential  5. IR evaluation for drainage of RLQ collection. Please send any fluid drained for bacterial and fungal culture     ID team 2 will follow

## 2023-03-12 NOTE — PROGRESS NOTE ADULT - SUBJECTIVE AND OBJECTIVE BOX
S: Reporting some bloating. No complaints associated with his amputation site. NGT in place.     O:     T(C): 36.7 (03-12-23 @ 05:01), Max: 37.3 (03-11-23 @ 09:22)  HR: 98 (03-12-23 @ 08:42) (98 - 116)  BP: 119/73 (03-12-23 @ 08:42) (117/72 - 141/94)  RR: 18 (03-12-23 @ 08:42) (17 - 18)  SpO2: 95% (03-12-23 @ 08:42) (95% - 98%)    Physical Exam:     General: Emaciated, frail appearing. Awake, alert conversant. In good spirits.   CV: Low grade tachycardia 100-105, wwp, hds  Pulm: On room air  Abd: Soft, tender in RLQ under dressing, flat  Extremity: LLE w/ amputation. Wound overall well appearing with some serous drainage at the medial third between two staples. Some fluid expressed. No pus identified. Staples in place.     I/O (24h)   I: 1706  O: 1150  N: +556    Labs:                           9.4    17.25 )-----------( 871      ( 11 Mar 2023 05:30 )             29.9   03-11    134<L>  |  98  |  12  ----------------------------<  129<H>  4.1   |  26  |  0.44<L>    Ca    9.0      11 Mar 2023 05:30  Phos  3.6     03-11  Mg     2.1     03-11    Rads:     CT Abd Pelvis    IMPRESSION:  Small bowel diffusely opacified with enteric contrast and distended to the level of anastomosis. Findings may reflect ileus given recent surgery. Follow-up is advised. Small pneumoperitoneum is likely postsurgical.    A/P: 78M with PMHx of IVDU found unresponsive at his nursing home, resolved after Narcan in the field and brought to [facility]. Found to have PE, atrial flutter, and large LV thrombus on ECHOand CTA showing mid SMA with embolus s/p Ex lap, SMA embolectomy, 80cm SBR, abthera vac left in discontinuity (12/11) and transferred to SICU intubated. S/p second look (12/13) and most recently s/p OR for 3rd look, end-to-end anastomosis of remaining bowel, loop ileostomy and abdomen closure (12/15). Prev stepped down to telemetry. LLE ischemia, AKA delayed by nutritional optimization. Transferred to SICU in the setting of sepsis for HD monitoring. 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 Pt w/ resolving tachycardia since OR and transient WBC, surgical site clean and intact w/o signs of infection.     - Local wound care- xeroform, wrap with kerlix and secure with ace wrap daily, changed today  - Remainder of care per primary team  - Vascular will continue to follow call [phone] with any questions or concerns

## 2023-03-12 NOTE — PROGRESS NOTE ADULT - ATTENDING COMMENTS
Afebrile, hemodynamically stable, sinus tachycardia  Abdomen soft, ostomy site dressing c/d/i, less distended  Anemic on AM labs, transfuse x 2 pRBCs and hold hep gtt  Continue NPO, NG, PICC, TPN  Awaiting return of bowel function, obtain XR to assess contrast progression

## 2023-03-12 NOTE — PROGRESS NOTE ADULT - ASSESSMENT
77M PMHx of IVDU found unresponsive at his nursing home, resolved after Narcan in the field and brought to [facility]. Found to have PE, atrial flutter, and large LV thrombus on echo. Transferred to [facility] for further management. Pt c/o abdominal pain on 12/10 CTA showing mid SMA with embolus. Abnormal distal small bowel loops and cecum with dilatation and pneumatosis suggesting infarcted bowel. Now s/p Ex lap, SMA embolectomy, 80cm SBR, abthera vac left in discontinuity (12/11). S/p second look (12/13), s/p OR for 3rd look, end-to-end anastomosis of remaining bowel, loop ileostomy and abdomen closure (12/15). S/p LORENZO and Cardioversion on 12/30 with cardiology. Patient was nutritionally optimized. 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 s/p L AKA closure (3/1), s/p ileostomy reversal (3/7).    -NPO/NGT  -PICC/TPN  -Hep gtt  -ID following: Erta( 2/13- ),  (Dapto 2/11- ) , (Fluc 3/9- )  -pain/nausea control prn   -Holding spironolactone, entresto, metoprolol, amiodarone  -SCDs, SQL 65BID.   -Dispo: ROWAN

## 2023-03-12 NOTE — PROGRESS NOTE ADULT - SUBJECTIVE AND OBJECTIVE BOX
INTERVAL HPI/OVERNIGHT EVENTS:    Patient was seen and examined at bedside.  Complains of gas in stomach.  Afebrile     CONSTITUTIONAL:  Negative fever or chills, feels well, good appetite  EYES:  Negative  blurry vision or double vision  CARDIOVASCULAR:  Negative for chest pain or palpitations  RESPIRATORY:  Negative for cough, wheezing, or SOB   GASTROINTESTINAL:  Negative for nausea, vomiting, diarrhea, constipation, or abdominal pain  GENITOURINARY:  Negative frequency, urgency or dysuria  NEUROLOGIC:  No headache, confusion, dizziness, lightheadedness      ANTIBIOTICS/RELEVANT:    MEDICATIONS  (STANDING):  aMIOdarone    Tablet 100 milliGRAM(s) Oral every 24 hours  ascorbic acid 500 milliGRAM(s) Oral daily  chlorhexidine 2% Cloths 1 Application(s) Topical daily  cholestyramine Powder (Sugar-Free) 2 Gram(s) Oral three times a day  DAPTOmycin IVPB 500 milliGRAM(s) IV Intermittent every 24 hours  fat emulsion (Fish Oil and Plant Based) 20% Infusion 0.7764 Gm/kG/Day (20.83 mL/Hr) IV Continuous <Continuous>  fluconAZOLE IVPB 400 milliGRAM(s) IV Intermittent every 24 hours  heparin  Infusion 1500 Unit(s)/Hr (15 mL/Hr) IV Continuous <Continuous>  influenza  Vaccine (HIGH DOSE) 0.7 milliLiter(s) IntraMuscular once  metoprolol tartrate Injectable 5 milliGRAM(s) IV Push every 6 hours  mirtazapine 30 milliGRAM(s) Oral at bedtime  multivitamin 1 Tablet(s) Oral daily  nystatin Powder 1 Application(s) Topical two times a day  pantoprazole  Injectable 40 milliGRAM(s) IV Push daily  Parenteral Nutrition - Adult 1 Each (83 mL/Hr) TPN Continuous <Continuous>  Parenteral Nutrition - Adult 1 Each (83 mL/Hr) TPN Continuous <Continuous>  povidone iodine 10% Solution 1 Application(s) Topical daily  sodium chloride 0.9% lock flush 10 milliLiter(s) IV Push every 8 hours  zinc sulfate 220 milliGRAM(s) Oral daily    MEDICATIONS  (PRN):  acetaminophen     Tablet .. 650 milliGRAM(s) Oral every 6 hours PRN Temp greater or equal to 38C (100.4F), Mild Pain (1 - 3)  lidocaine   4% Patch 1 Patch Transdermal once PRN abd pain  melatonin 3 milliGRAM(s) Oral at bedtime PRN Insomnia  ondansetron Injectable 4 milliGRAM(s) IV Push every 6 hours PRN Nausea and/or Vomiting  sodium chloride 0.9% lock flush 10 milliLiter(s) IV Push every 1 hour PRN Pre/post blood products, medications, blood draw, and to maintain line patency        Vital Signs Last 24 Hrs  T(C): 36.7 (12 Mar 2023 13:53), Max: 37.3 (11 Mar 2023 21:50)  T(F): 98 (12 Mar 2023 13:53), Max: 99.1 (11 Mar 2023 21:50)  HR: 104 (12 Mar 2023 11:17) (98 - 116)  BP: 116/73 (12 Mar 2023 11:17) (116/73 - 136/78)  BP(mean): 90 (12 Mar 2023 11:17) (90 - 104)  RR: 18 (12 Mar 2023 11:17) (17 - 18)  SpO2: 94% (12 Mar 2023 11:17) (94% - 98%)    Parameters below as of 12 Mar 2023 11:17  Patient On (Oxygen Delivery Method): room air        PHYSICAL EXAM:  Constitutional:  cachectic, ill appearing   Eyes:JOHN, EOMI  Ear/Nose/Throat: no oral lesion	  Neck:  supple  Respiratory: CTA keerthi  Cardiovascular: S1S2 RRR, no murmurs  Gastrointestinal:soft, (+) BS, no HSM  Extremities:  s/p left AKA   Vascular: DP Pulse:	right normal; left normal      LABS:                        8.0    20.14 )-----------( 713      ( 12 Mar 2023 09:09 )             25.5     03-12    137  |  101  |  18  ----------------------------<  132<H>  4.1   |  25  |  0.46<L>    Ca    8.8      12 Mar 2023 09:09  Phos  3.6     03-12  Mg     2.1     03-12      PT/INR - ( 10 Mar 2023 20:18 )   PT: 15.1 sec;   INR: 1.27          PTT - ( 12 Mar 2023 10:21 )  PTT:75.1 sec    C. difficile GDH &amp; toxins A/B by EIA (03.03.23 @ 10:09)    Clostridium difficile GDH Toxins A&amp;B, EIA:   Indeterminate    Clostridium difficile GDH Interpretation: This specimen is positive for C. Difficile glutamate dehydrogenase (GDH)  antigen and negative for C. Difficile Toxins A & B, by EIA.  GDH is a  highly sensitive screening marker for C. Difficile that is produced in  large amounts by all C. Difficilestrains, both toxigenic and  nontoxigenic.  Specimens that are GDH positive and toxin negative will be  tested further by PCR to detect toxin gene sequences associated with  toxin producing C. Difficle.        MICROBIOLOGY:    Culture - Blood (03.08.23 @ 12:21)    Specimen Source: .Blood Blood-Venous    Culture Results:   No growth at 3 days.        RADIOLOGY & ADDITIONAL STUDIES:    < from: CT Abdomen and Pelvis w/ Oral Cont and w/ IV Cont (03.11.23 @ 15:57) >    BOWEL: The small bowel loops are diffusely opacified with contrast and   distended to the level of the anastomosis in the right lower quadrant.   Large bowel is predominantly collapsed.. Enteric catheter with tip in   gastric fundus. Transverse colon is likely collapsed. There is mural   thickening of the ascending colon with surrounding free fluid, unchanged.  PERITONEUM: Small free fluid in the abdomen and pelvis has increased.   Small pneumoperitoneum is felt to be postsurgical. Fluid collection in   the right lower abdomen is unchanged.  VESSELS: Atherosclerotic changes.  RETROPERITONEUM/LYMPH NODES: No lymphadenopathy.  ABDOMINAL WALL: A 6.8 x 1.8 cm collection in the right anterior abdominal   wall at the site of prior ileostomy. Focal defect in the skin in this   region.  BONES: Degenerative changes.    IMPRESSION:  Small bowel diffusely opacified with enteric contrast and distended to   the level of anastomosis. Findings may reflect ileus given recent   surgery. Follow-up is advised. Small pneumoperitoneum is likely   postsurgical.    < end of copied text >

## 2023-03-13 NOTE — PROGRESS NOTE ADULT - ASSESSMENT
77M PMHx of IVDU found unresponsive at his nursing home, resolved after Narcan in the field and brought to [facility]. Found to have PE, atrial flutter, and large LV thrombus on echo. Transferred to [facility] for further management. Pt c/o abdominal pain on 12/10 CTA showing mid SMA with embolus. Abnormal distal small bowel loops and cecum with dilatation and pneumatosis suggesting infarcted bowel. Now s/p Ex lap, SMA embolectomy, 80cm SBR, abthera vac left in discontinuity (12/11). S/p second look (12/13), s/p OR for 3rd look, end-to-end anastomosis of remaining bowel, loop ileostomy and abdomen closure (12/15). S/p LORENZO and Cardioversion on 12/30 with cardiology. Patient was nutritionally optimized. 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 s/p L AKA closure (3/1), s/p ileostomy reversal (3/7).    -NPO/NGT  -PICC/TPN  -Hep gtt  -ID following: Erta( 2/13- ),  (Dapto 2/11- ) , (Fluc 3/9- )  -pain/nausea control prn   -Holding spironolactone, entresto, metoprolol, amiodarone  -SCDs, SQL 65BID.   -Dispo: ROWAN

## 2023-03-13 NOTE — PROGRESS NOTE ADULT - ASSESSMENT
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  Patient afebrile since drainage.  Fluid cultures from OR with Klebsiella suggesting this is the source of the bacteremia     Now culture with Candida albicans.  Given that this was from OR and he continues to have fever and leukocytosis, recommend treating candida albicans     He is afebrile but WBC continues to increase. Unclear etiology of this as we do not have a differential.  It's possibly reactive?  Could also be due to collection seen on most recent CT. It's possible that the acute bleed is also causing the leukocytosis     Recommend:  1.  Continue Daptomycin 500 mg IV daily  2.  Continue Ertapenem 1 gram IV daily  3.   QTc 474.  Continue Fluconazole 400 mg IV daily.  Check EKG in AM on 3/13/23.  If > 500, will need to stop Fluconazole and start Caspofungin   4. IR evaluation for drainage of RLQ collection. Please send any fluid drained for bacterial and fungal culture     ID team 2 will follow

## 2023-03-13 NOTE — PROGRESS NOTE ADULT - SUBJECTIVE AND OBJECTIVE BOX
SUBJECTIVE: Patient seen and evaluated.        MEDICATIONS  (STANDING):  aMIOdarone    Tablet 100 milliGRAM(s) Oral every 24 hours  ascorbic acid 500 milliGRAM(s) Oral daily  chlorhexidine 2% Cloths 1 Application(s) Topical daily  cholestyramine Powder (Sugar-Free) 2 Gram(s) Oral three times a day  DAPTOmycin IVPB 500 milliGRAM(s) IV Intermittent every 24 hours  ertapenem  IVPB 1000 milliGRAM(s) IV Intermittent every 24 hours  fat emulsion (Fish Oil and Plant Based) 20% Infusion 0.7764 Gm/kG/Day (20.83 mL/Hr) IV Continuous <Continuous>  fat emulsion (Fish Oil and Plant Based) 20% Infusion 0.7764 Gm/kG/Day (20.83 mL/Hr) IV Continuous <Continuous>  fluconAZOLE IVPB 400 milliGRAM(s) IV Intermittent every 24 hours  influenza  Vaccine (HIGH DOSE) 0.7 milliLiter(s) IntraMuscular once  melatonin Liquid 5 milliGRAM(s) Oral at bedtime  metoprolol tartrate Injectable 5 milliGRAM(s) IV Push every 6 hours  mirtazapine 30 milliGRAM(s) Oral at bedtime  multivitamin 1 Tablet(s) Oral daily  nystatin Powder 1 Application(s) Topical two times a day  pantoprazole  Injectable 40 milliGRAM(s) IV Push daily  Parenteral Nutrition - Adult 1 Each (83 mL/Hr) TPN Continuous <Continuous>  Parenteral Nutrition - Adult 1 Each (83 mL/Hr) TPN Continuous <Continuous>  povidone iodine 10% Solution 1 Application(s) Topical daily  sodium chloride 0.9% lock flush 10 milliLiter(s) IV Push every 8 hours  zinc sulfate 220 milliGRAM(s) Oral daily    MEDICATIONS  (PRN):  acetaminophen     Tablet .. 650 milliGRAM(s) Oral every 6 hours PRN Temp greater or equal to 38C (100.4F), Mild Pain (1 - 3)  lidocaine   4% Patch 1 Patch Transdermal once PRN abd pain  melatonin 3 milliGRAM(s) Oral at bedtime PRN Insomnia  ondansetron Injectable 4 milliGRAM(s) IV Push every 6 hours PRN Nausea and/or Vomiting  sodium chloride 0.9% lock flush 10 milliLiter(s) IV Push every 1 hour PRN Pre/post blood products, medications, blood draw, and to maintain line patency      Vital Signs Last 24 Hrs  T(C): 36.8 (13 Mar 2023 09:21), Max: 37.5 (12 Mar 2023 22:04)  T(F): 98.2 (13 Mar 2023 09:21), Max: 99.5 (12 Mar 2023 22:04)  HR: 106 (13 Mar 2023 08:29) (104 - 112)  BP: 106/60 (13 Mar 2023 08:29) (106/60 - 126/77)  BP(mean): 77 (13 Mar 2023 08:29) (77 - 98)  RR: 17 (13 Mar 2023 08:29) (17 - 18)  SpO2: 99% (13 Mar 2023 08:29) (94% - 99%)    Parameters below as of 13 Mar 2023 08:29  Patient On (Oxygen Delivery Method): room air        Physical Exam:  General: Emaciated, frail appearing. Awake, alert conversant. In good spirits.   CV: Low grade tachycardia 100-105  Pulm: On room air  Abd: Soft, tender in RLQ under dressing  Extremity: LLE w/ amputation. Wound overall well appearing Staples in place.       I&O's Summary    12 Mar 2023 07:01  -  13 Mar 2023 07:00  --------------------------------------------------------  IN: 1188 mL / OUT: 2825 mL / NET: -1637 mL    13 Mar 2023 07:01  -  13 Mar 2023 10:00  --------------------------------------------------------  IN: 0 mL / OUT: 400 mL / NET: -400 mL        LABS:                        4.7    16.82 )-----------( 472      ( 13 Mar 2023 07:44 )             15.0     03-13    135  |  100  |  18  ----------------------------<  128<H>  3.7   |  26  |  0.43<L>    Ca    7.7<L>      13 Mar 2023 07:20  Phos  3.3     03-13  Mg     1.8     03-13      PTT - ( 13 Mar 2023 07:20 )  PTT:86.2 sec    CAPILLARY BLOOD GLUCOSE            RADIOLOGY & ADDITIONAL STUDIES:

## 2023-03-13 NOTE — PROGRESS NOTE ADULT - ASSESSMENT
77M first presented to [facility] from [facility] due to unresponsiveness (responded to Narcan). At [facility], found to have subsegmental pulmonary embolism in RUL, rapid atrial flutter with severely reduced LVEF with LV thrombus. Transferred to [facility] on 12/7/22 for LORENZO and possible ablation. At [facility], was found to have left leg ischemia (left leg arterial thrombus on LE duplex on 12/8). Was being considered for rhythm control when he developed abdominal pain, CTA (12/10/22) showed embolus in SMA, bowel infarct, requiring ex-lap on 12/11 with SMA embolectomy, 80cm small bowel resection; On 12/13, underwent 2nd look laparotomy, with 80cm small bowel resection, closure with abthera. On 12/15, underwent 3rd look laparotomy, end-to-end anastomosis of remaining bowel, loop ileostomy and abdominal closure. Eventually underwent LORENZO/DCCV on 12/30/22, now in sinus rhythm. More recently found to have k. 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     #bacteremia - k pneumoniae, VRE facium suspected from abdominal abscess  #post op state  3.7   #sepsis  - SIRs 3/4; HR > 90, wbc> 12k, temp > 100.4   - leukocytosis w wbc 17k -> now trending down to 13k  - blood cultures from 3/1 without any growth, continue with daptomycin and ertapenem, 3/8 repeat b cx's show NGTD, s/p OR w washout 3/7-> intra op cultures now growing candida and klebs-> started on fluconazole 3/9-> check qtc qd ( 480 )   - continue to monitor for fever - appreciate ongoing ID input    # Bowel ischemia - s/p SMA embolectomy; Small bowel resection  - ileostomy reversed in OR for 3/7  - plan of primary team  - continue loperamide and diphenoxylate atropine  #s/p left AKA  - s/p OR with vascular for wound closure     #anemia of chronic disease  - hg 7.9 after 1 prbc - ->8.4 --7.9-8.9--8.6-->8->6->4.5 ( pending Repeat hgb / type n screen  - would recommend IV PPI, continue to hold chemo dvt ppx   - no e/o active bleeding  - transfusion goal > 7     #Suicidal Ideation  #Insomnia   - Continue mirtazapine 30mg QHS   - psychiatry following, and appreciate any input    # HFrEF, not in exacerbation   - TTE from 2/11 reviewed, normal LVEF   - Metoprolol succinate 25mg qd - continue  - not volume overloaded.  cont to hold entresto and spironolactone.      # Atrial Flutter  - s/p DCCV 12/30  - EP recs - uninterrupted AC x 30 days ; January 29th was 30 days post DCCV.   - was on full dose Lovenox- transitioned to heparin drip post op, cw amiodarone and metoprolol succinate    #hyponatremia  -nl Na 138      -# Pulmonary embolism (subsegmental RUL)   - After completion of AC for DCCV, would need to continue AC for PE - currently primary team holding lovenox post op       # Severe protein-calorie malnutrition  # Sacral wound   - appreciate input from nutrition  - Wound care per nursing    #DVT ppx - hep drip

## 2023-03-13 NOTE — CONSULT NOTE ADULT - ATTENDING COMMENTS
IVDU, AF, multiple thromboemboli with PE, bowel infarction, left AKA, now with worsening fever, leukocytosis, coffee ground emesis, ABLA  physical as above  given 2 units PRBC  fever could be from blood tx but with worsening WBC and downward BP trend will give 2 L crystalloid as well as 2 units PRBC  d/w ID and to broaden abx pending cultures to meropenem/caspofungin/tobramycin, continue daptomycin  PICC site looks clean  serial H/h, hold AC; GI consult
Minimally elevated troponin likely demand in setting of multiple acute insults and atrial flutter. He is not an appropriate candidate for coronary angiography. Continue anticoagulation for which he already has an indication. HF team will follow tomorrow
Patient is a 77 year old gentleman with history of IV drug use who was initially found unresponsive at nursing home and taken to [facility] where he was found to have A Flutter and reduced LV function secondary to large LV thrombus. He was subsequently transferred to [facility] for further management. During current admission to cardiology, was noted to have developed abdominal pain and associated nausea as well as bloody bowel movement on evening of 12/10/22. CTA abdomen was obtained demonstrated occlusive emboli to mid and distal branches of SMA with associated inflammatory thickening of small bowel concerning for bowel ischemia. Vascular surgery was consulted and planned to take patient emergently to OR for SMA embolectomy and revascularization. We are consulted by Dr. Erika Greenberg for concurrent exploration to evaluate for ischemic bowel, possible bowel resection, and possible ostomy. At time of evaluation, patient afebrile, hemodyanmically stable though in atrial flutter, abdomen soft, mildly distended. Will plan for emergent OR exploration this morning.
77M no PMH initially p/w unresponsiveness at NH, given Narcan with resolution, then at [facility] he was persistently tachy. UTox + opioid. CTPE showed subsegmental PE in RUL. TTE showed reduced LV function with LV thrombus. He was sent to [facility] on 12/7 for possible ablation for persistent Aflutter. Since admission, LORENZO showed no thrombus. Arterial duplex showed acute thrombus completely occluding L poplteal artery. He developed LLE ischemia on 12/8 and is planned for BKA. Then developed bloody BM with abdominal pain, CT a/p 12/10 showed occlusive thrombosis of mid-distal SMA and inflammatory thickening small bowel c/w ischemic enteritis. He underwent emergent ex-lap, SMA embolectomy and 80cm of ischemic bowel resected on 12/10/22. He was RTOR on 12/13 for additional 40cm small bowel resection and 12/15 for end-to-end anastomosis of remaining bowel, loop ileostomy and abdominal closure.  He then developed BRBPR and large R hemiabdomen hematoma. Repeat CT a/p showed decreasing hematoma size. He underwent LORENZO/DCCV on 12/30. Primary team has been trying to optimize his nutritional status for BKA and started TPN on 1/10.  On 1/14, he became febrile.  WBC aroudn 11-12.  BCx 1/14 ngtd, CXR clear, UA neg. CT a/p 1/15 showed slight decrease in size of R hemiabdomen hematoma.  Per team/RN, sacral ulcer doesn't look infected, and L foot with dry gangrene, no e/o infection.  Patient denied rhinorrhea, sore throat, n/v/d, abdominal pain, cough, CP, SOB.  Unclear cause of fever.  Could be viral.  Check flu with COVID-19 PANTERA.  Check CRP and procal.     Team 1will follow you.  Case d/w primary team.    Shraddha Jerome MD, MS  Infectious Disease attending  work cell [phone]   For any questions during evening/weekend/holiday, please page ID on call
77M w/ h/o opiate abuse, found to be unresponsive at nursing home, responded to narcan  -> found to have persistent AFlut w/ RVR, brought to be [facility] where he was also found to have new acute sCHF, now tx'd to [facility] for further mgmt      -Vascular - Pt. w/ known LLE arterial occlusion discovered on this admission, confirmed on imaging. Likely embolic from LV thrombus -> Vascular following, initially for potential LLE angiogram on Friday, but w/ improving exam, +doppler pulses; currently on ASA, Heparin gtt (PTT goal ). Yesterday pt developed abdominal discomfort, N&V and Diarrhea, HTNive initially consistent w/ Opiate withdrawal (pt. w/ active use before admission), however subsequently had a melanotic BM - underwent CTA Abdomen showing acute SMA occlusion w/ ischemic enteritis. Gen Surgery consulted, pt. went emergently to OR this am, s/p resection ~80cm of ischemic bowel; currently intubated and sedated w/ open abdomen -> tx'd to SICU service w/ Cardiology following closely  -CVS - acute systolic CHF - possibly Tachy-induced CM, has not had formal ischemic w/u; TTE: LVEF 10-15%, Large LV thrombus, Severely reduced RV function, normal RV size; Pt/ initially w/ evidence of low output state likely 2/2 AFlut w/ RVR, responded well to to Nipride gtt w/ plan to transition to Oral GDMT post Rhythm Control. However, pt. w/ LV thrombus and now multiple embolic events including acute mesenteric ischemic c/b ischemic enteritis - currently in Atrial Flutter w/ RVR ~120s, currently HD stable; c/w Heparin gtt; EP following- s/p LORENZO - No LA/ISIAH thrombus - c/w Amiodarone gtt for now; Plan to repeat TTE today, concern that LV thrombus fully embolized; Otherwise euvolemic to dry, currently on Levophed; Would add Vasopressin and try not increase Levophed too much further. Pt. does have elevated MAP goals of ~80s post mesenteric ischemia; Will consult Advanced HF to help further manage; Ok to give IV fluids for BP support especially if increasing pressor requirements  -Pulm - Found to have PE on CTA chest - s/p now s/p intubation for emergent bowel resection, remains intubated and sedated, dry on exam, currently on Heparin gtt   -NPO  -DVT PPx  -Dispo: Transfer to SICU service; Cardiology to follow closely    Dago Mares MD  CCU Attending
I agree with the fellow's findings and plans as written above with the following additions/amendments:    Seen and examined at bedside. Intubated, sedated, hx as above, appears dry on exam, requiring further fluid repletion. Further recs as above, discussed in detail with primary team
78 YO M with a history of cocaine/opiate abuse and resident of a nursing facility who was brought to [facility] with unresponsiveness that was alleviated with narcan and found to be in rapid atrial flutter with severe LV dysfunction with thrombus and subsegemental PE prompting transfer to [facility] on 12/7. He was also found to have limb ischemia with arterial thrombosis of LLE vessels. He was being considered for rhythm control when he developed abdominal pain and found to have embolic limb ischemia and underwent emergent bowel resection and SMA thrombectomy. His LV thrombus us no longer apparent on TTE and has likely embolized.     He is currently intubated and awaiting return to OR to examine bowel. From a HF perspective, he is euvolemic with CVP's in low single digits and central venous saturations suggest normal cardiac output. Prognosis is guarded.    REVIEW OF STUDIES  TTE: LV 5.0 cm, LVEF 10-15% with global hypokinesis, 2.5 cm mobile LV thrombus, severe RV dysfunction  LORENZO: no ISIAH thrombus     PLAN  # Acute systolic heart failure  - Wean levophed as tolerates, from a HF perspective would prefer MAPs 65-70  - Euvolemic off diuretics, OK to bolus 500 cc NS. Continue CVP monitoring and target CVP 8  - Continue to follow daily central venous saturations, no signs of low cardiac output at this time   - Etiology is possibly tachycardia induced. not a candidate for rhythm control at this time    # Atrial flutter  - EP following  - Continue heparin drip  - Not a candidate for rhythm control at this time    # Acute limb ischemia and mesenteric ischemia from LV thrombus  - management per surgery/vascular  - continue anticoagulation    # GOLDIE  - likely post-op ATN and renal infarction  - nephrology following  - hemodynamic management as above

## 2023-03-13 NOTE — CONSULT NOTE ADULT - ASSESSMENT
77M PMHx of IVDU found unresponsive at his nursing home, resolved after Narcan in the field and brought to [facility]. Found to have PE, atrial flutter, and large LV thrombus on echo. Transferred to [facility] for further management. Pt c/o abdominal pain on 12/10 CTA showing mid SMA with embolus. Abnormal distal small bowel loops and cecum with dilatation and pneumatosis suggesting infarcted bowel. Now s/p Ex lap, SMA embolectomy, 80cm SBR, abthera vac left in discontinuity (12/11). S/p second look (12/13), s/p OR for 3rd look, end-to-end anastomosis of remaining bowel, loop ileostomy and abdomen closure (12/15). S/p LORENZO and Cardioversion on 12/30 with cardiology. Patient was nutritionally optimized. 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 s/p L AKA closure (3/1), s/p ileostomy reversal (3/7) c/b ileus. Now with upper GI bleeding w/ coffee ground emesis. In SICU for hemodynamic monitoring.     Neuro: Tylenol and Oxycodone PRN. Sleep: melotonin PRN, zofran PRN for nausea  CV: Tachycardic 2/2 to upper GI bleed and fever. Repeat echo with EF (02/11) 55-60%. Mildly dilated RV. Maintain MAP >65. A.Fib/Flutter: s/p cardioversion on 12/30. Cont metoprolol/amiodarone; hx HTN: holding spironolactone, Entresto  Pulm: Sating well on RA  GI/FEN: Upper GI bleed: on PPI gtt,  f/u CT IV/PO abd/pelvis, EGD in AM, NPO, NGT to LIWS, NPO. s/p ostomy reversal (3/7), PICC w/ TPN and lipids, cont mirtazapine  : Condom cath in place. Strict I and O.   Endo: mISS  Heme:  Anemia Hb 5.7 s/p 1uPRBC (02/13), pending 2nd unit. LV thrombis w/ LLE ischemia. Heparin gtt held. Active T&S;   ID: RLQ abscess, LLE ischemia s/p L AKA 2/15; VRE faecium and ESBL Klebsiella BSI (enteric napoleon), Cont: erta (2/13-3/10; 3/13--), (Dapto 2/11-3/9; 3/10--), Fluconasole (3/9--); dc//:: Zosyn. Vanc (2/10--2/11), Luis (2/10-2/12)  PPX: SCDs, hold HSQ  L/D/T: PIV  PT/OT: Not ordered  Dispo: SICU

## 2023-03-13 NOTE — CONSULT NOTE ADULT - SUBJECTIVE AND OBJECTIVE BOX
SICU Consult Note    HPI:  Patient is a 78 y/o male with no significant past medical history BIBA from [facility] due to unresponsiveness. Patient was reportedly found unresponsive at his nursing home, Narcan was given in the field with resolution, and patient was taken to [facility]. Pt was persistently tachycardic in 130-140s despite receiving Adenosine 6mg IV and then 12mg IV, Lopressor 2.5mg x2, PO Metoprolol tartrate 100mg. Utox was positive for opioids. CTPA suggestive of subsegmental PE in the right upper lobe. Echo showed severely reduced LV function with an LV thrombus. Heparin gtt was started. Cardiology and EP were consulted due to atrial flutter. EP suggested transfer to [facility] for LORENZO and possible ablation.  (07 Dec 2022 12:26)      Intensivist:  Dr. Albright    SICU Addendum: 77M PMHx of IVDU found unresponsive at his nursing home, resolved after Narcan in the field and brought to [facility]. Found to have PE, atrial flutter, and large LV thrombus on echo. Transferred to [facility] for further management. Pt c/o abdominal pain on 12/10 CTA showing mid SMA with embolus. Abnormal distal small bowel loops and cecum with dilatation and pneumatosis suggesting infarcted bowel. Now s/p Ex lap, SMA embolectomy, 80cm SBR, abthera vac left in discontinuity (12/11). S/p second look (12/13), s/p OR for 3rd look, end-to-end anastomosis of remaining bowel, loop ileostomy and abdomen closure (12/15). S/p LORENZO and Cardioversion on 12/30 with cardiology. Patient was nutritionally optimized. 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 s/p L AKA closure (3/1), s/p ileostomy reversal (3/7) c/b ileus with NGT decompression. This AM Hb 5.7 and patient completed 1 unit of blood. Started having coffee ground emesis around NGT in the afternoon with NGT output also concerning for upper GI bleed. Nonbloody stool per rectum.  Patient hemodynamically stable but stepped up to SICU for further observation and PPI gtt. Spoke to GI - plan for EGD tomorrow after resuscitation. In the unit patient febrile to 103.8 per rectum - bl cx, UA sent and CXR not concerning for PNA.         PAST MEDICAL & SURGICAL HISTORY:      MEDICATIONS  (STANDING):  acetaminophen   IVPB .. 1000 milliGRAM(s) IV Intermittent once  aMIOdarone    Tablet 100 milliGRAM(s) Oral every 24 hours  ascorbic acid 500 milliGRAM(s) Oral daily  chlorhexidine 2% Cloths 1 Application(s) Topical daily  cholestyramine Powder (Sugar-Free) 2 Gram(s) Oral three times a day  DAPTOmycin IVPB 500 milliGRAM(s) IV Intermittent every 24 hours  dextrose 5%. 1000 milliLiter(s) (100 mL/Hr) IV Continuous <Continuous>  dextrose 5%. 1000 milliLiter(s) (50 mL/Hr) IV Continuous <Continuous>  dextrose 50% Injectable 25 Gram(s) IV Push once  dextrose 50% Injectable 12.5 Gram(s) IV Push once  dextrose 50% Injectable 25 Gram(s) IV Push once  diatrizoate meglumine/diatrizoate sodium. 30 milliLiter(s) Oral once  ertapenem  IVPB 1000 milliGRAM(s) IV Intermittent every 24 hours  fat emulsion (Fish Oil and Plant Based) 20% Infusion 0.7764 Gm/kG/Day (20.83 mL/Hr) IV Continuous <Continuous>  fluconAZOLE IVPB 400 milliGRAM(s) IV Intermittent every 24 hours  glucagon  Injectable 1 milliGRAM(s) IntraMuscular once  influenza  Vaccine (HIGH DOSE) 0.7 milliLiter(s) IntraMuscular once  insulin lispro (ADMELOG) corrective regimen sliding scale   SubCutaneous every 6 hours  lactated ringers Bolus 1000 milliLiter(s) IV Bolus once  melatonin Liquid 5 milliGRAM(s) Oral at bedtime  metoprolol tartrate Injectable 5 milliGRAM(s) IV Push every 6 hours  mirtazapine 30 milliGRAM(s) Oral at bedtime  multivitamin 1 Tablet(s) Oral daily  nystatin Powder 1 Application(s) Topical two times a day  pantoprazole Infusion 8 mG/Hr (10 mL/Hr) IV Continuous <Continuous>  Parenteral Nutrition - Adult 1 Each (83 mL/Hr) TPN Continuous <Continuous>  Parenteral Nutrition - Adult 1 Each (83 mL/Hr) TPN Continuous <Continuous>  povidone iodine 10% Solution 1 Application(s) Topical daily  sodium chloride 0.9% lock flush 10 milliLiter(s) IV Push every 8 hours  zinc sulfate 220 milliGRAM(s) Oral daily    MEDICATIONS  (PRN):  acetaminophen     Tablet .. 650 milliGRAM(s) Oral every 6 hours PRN Temp greater or equal to 38C (100.4F), Mild Pain (1 - 3)  dextrose Oral Gel 15 Gram(s) Oral once PRN Blood Glucose LESS THAN 70 milliGRAM(s)/deciliter  lidocaine   4% Patch 1 Patch Transdermal once PRN abd pain  melatonin 3 milliGRAM(s) Oral at bedtime PRN Insomnia  ondansetron Injectable 4 milliGRAM(s) IV Push every 6 hours PRN Nausea and/or Vomiting  sodium chloride 0.9% lock flush 10 milliLiter(s) IV Push every 1 hour PRN Pre/post blood products, medications, blood draw, and to maintain line patency      Allergies    No Known Allergies    Intolerances        SOCIAL HISTORY:    FAMILY HISTORY:      Vital Signs Last 24 Hrs  T(C): 37.6 (13 Mar 2023 14:05), Max: 37.6 (13 Mar 2023 14:05)  T(F): 99.6 (13 Mar 2023 14:05), Max: 99.6 (13 Mar 2023 14:05)  HR: 133 (13 Mar 2023 15:35) (106 - 133)  BP: 112/61 (13 Mar 2023 15:35) (103/57 - 126/77)  BP(mean): 79 (13 Mar 2023 15:35) (77 - 98)  RR: 18 (13 Mar 2023 15:35) (17 - 18)  SpO2: 99% (13 Mar 2023 15:35) (96% - 99%)    Parameters below as of 13 Mar 2023 15:35  Patient On (Oxygen Delivery Method): room air        I&O's Summary    12 Mar 2023 07:01  -  13 Mar 2023 07:00  --------------------------------------------------------  IN: 1188 mL / OUT: 2825 mL / NET: -1637 mL    13 Mar 2023 07:01  -  13 Mar 2023 18:09  --------------------------------------------------------  IN: 1047 mL / OUT: 1650 mL / NET: -603 mL        Physical Exam:  General: NAD, resting comfortably  HEENT: NC/AT, neck supple, dry mucus membranes  Pulmonary: normal resp effort, CTA-B  Cardiovascular: tachycardic  Abdominal: soft, mildly distended, diffusely tender to palpation, RLQ with ostomy reversal site with met to dry dressing, midline incision appears well-healing, NGT w/ coffee ground output  Extremities: LLE w/ amputation, WWP, no clubbing/cyanosis/edema  Neuro: A/O x 3, CNs II-XII grossly intact      Lines/drains/tubes:    LABS:                        8.0    14.19 )-----------( 564      ( 13 Mar 2023 15:58 )             24.2     03-13    137  |  103  |  25<H>  ----------------------------<  129<H>  4.0   |  25  |  0.48<L>    Ca    8.4      13 Mar 2023 15:58  Phos  3.3     03-13  Mg     1.8     03-13      PTT - ( 13 Mar 2023 07:20 )  PTT:86.2 sec    CAPILLARY BLOOD GLUCOSE            Cultures:      RADIOLOGY & ADDITIONAL STUDIES:

## 2023-03-13 NOTE — CHART NOTE - NSCHARTNOTEFT_GEN_A_CORE
Patient seen and examined. GI reconsulted for concern for UGIB.   Tentative plan for endoscopic evaluation tomorrow, pending resuscitation  Transfusions per primary team  PPI infusion  maintain npo status    Thank you for the courtesy of this consult. We will follow along with you.    Kurt Zarate M.D.  Gastroenterology Fellow  Weekday Pager: [phone]  Weeknights/Weekend Coverage: Please Call the  for contact info

## 2023-03-13 NOTE — CHART NOTE - NSCHARTNOTEFT_GEN_A_CORE
***Chief Resident Event Note***    Alerted by floor team that pt had large volume episode of coffee ground emesis around NGT, "approx 1L" total output for day from NGT approx 1L thin, black fluid.   Pt seen and examined by senior resident on team. Denies lightheadedness, nausea, vomiting, endorses persistent abd pain,   AAO x3, CN2-12 intact. Soft/ND/LUQ TTP, w/o peritoneal finding. COURTNEY appropriate sphincter tone, no masses appreciated, +light brown stool on finger.   VSS notable for sinus tachycardia -053n, normotensive, afebrile.  Labs during AM rounds prior to episodes of emesis notable for H/H 6.0/19/4 (8.0/25.5), repeat H/H 5.7/18.0.   Currently pt has received 1u PRBC, 1u pending. Hep gtt on hold.   STAT labs obtained to include lactate. Will admit to SICU, consult GI for upper endoscopy, and transition PPI BID to PPI gtt.   DDx includes stress ulcer vs mucosal lesion 2/2 NGT agitation w acute onset of bleeding 2/2 hep gtt.    Discussed w attending surgeon.

## 2023-03-13 NOTE — PROGRESS NOTE ADULT - SUBJECTIVE AND OBJECTIVE BOX
Patient is a 78y old  Male who presents with a chief complaint of A flutter (13 Mar 2023 11:19)      INTERVAL HPI/OVERNIGHT EVENTS: hgb noted to have     MEDICATIONS  (STANDING):  aMIOdarone    Tablet 100 milliGRAM(s) Oral every 24 hours  ascorbic acid 500 milliGRAM(s) Oral daily  chlorhexidine 2% Cloths 1 Application(s) Topical daily  cholestyramine Powder (Sugar-Free) 2 Gram(s) Oral three times a day  DAPTOmycin IVPB 500 milliGRAM(s) IV Intermittent every 24 hours  ertapenem  IVPB 1000 milliGRAM(s) IV Intermittent every 24 hours  fat emulsion (Fish Oil and Plant Based) 20% Infusion 0.7764 Gm/kG/Day (20.83 mL/Hr) IV Continuous <Continuous>  fluconAZOLE IVPB 400 milliGRAM(s) IV Intermittent every 24 hours  influenza  Vaccine (HIGH DOSE) 0.7 milliLiter(s) IntraMuscular once  melatonin Liquid 5 milliGRAM(s) Oral at bedtime  metoprolol tartrate Injectable 5 milliGRAM(s) IV Push every 6 hours  mirtazapine 30 milliGRAM(s) Oral at bedtime  multivitamin 1 Tablet(s) Oral daily  nystatin Powder 1 Application(s) Topical two times a day  pantoprazole  Injectable 40 milliGRAM(s) IV Push daily  Parenteral Nutrition - Adult 1 Each (83 mL/Hr) TPN Continuous <Continuous>  Parenteral Nutrition - Adult 1 Each (83 mL/Hr) TPN Continuous <Continuous>  povidone iodine 10% Solution 1 Application(s) Topical daily  sodium chloride 0.9% lock flush 10 milliLiter(s) IV Push every 8 hours  zinc sulfate 220 milliGRAM(s) Oral daily    MEDICATIONS  (PRN):  acetaminophen     Tablet .. 650 milliGRAM(s) Oral every 6 hours PRN Temp greater or equal to 38C (100.4F), Mild Pain (1 - 3)  lidocaine   4% Patch 1 Patch Transdermal once PRN abd pain  melatonin 3 milliGRAM(s) Oral at bedtime PRN Insomnia  ondansetron Injectable 4 milliGRAM(s) IV Push every 6 hours PRN Nausea and/or Vomiting  sodium chloride 0.9% lock flush 10 milliLiter(s) IV Push every 1 hour PRN Pre/post blood products, medications, blood draw, and to maintain line patency      __________________________________________________  REVIEW OF SYSTEMS:    CONSTITUTIONAL: No fever,   EYES: no acute visual disturbances  NECK: No pain or stiffness  RESPIRATORY: No cough; No shortness of breath  CARDIOVASCULAR: No chest pain, no palpitations  GASTROINTESTINAL:  +pain. No nausea or vomiting; No diarrhea   NEUROLOGICAL: No headache or numbness, no tremors  MUSCULOSKELETAL: + joint pain, no muscle pain  GENITOURINARY: no dysuria, no frequency, no hesitancy  PSYCHIATRY: no depression , no anxiety  ALL OTHER  ROS negative        Vital Signs Last 24 Hrs  T(C): 36.8 (13 Mar 2023 09:21), Max: 37.5 (12 Mar 2023 22:04)  T(F): 98.2 (13 Mar 2023 09:21), Max: 99.5 (12 Mar 2023 22:04)  HR: 106 (13 Mar 2023 08:29) (106 - 112)  BP: 106/60 (13 Mar 2023 08:29) (106/60 - 126/77)  BP(mean): 77 (13 Mar 2023 08:29) (77 - 98)  RR: 17 (13 Mar 2023 08:29) (17 - 18)  SpO2: 99% (13 Mar 2023 08:29) (96% - 99%)    Parameters below as of 13 Mar 2023 08:29  Patient On (Oxygen Delivery Method): room air        ________________________________________________  PHYSICAL EXAM:  GENERAL: NAD, appears fatigued and more lethargic   HEENT: Normocephalic;  conjunctivae and sclerae clear; dry mucous membranes; NGT w coffee ground   NECK : supple  CHEST/LUNG: Clear to auscultation bilaterally with good air entry   HEART: S1 S2  regular; no murmurs, gallops or rubs  ABDOMEN: Soft, +tender, Nondistended   EXTREMITIES: no cyanosis; no edema; no calf tenderness  SKIN: warm and dry; no rash  NERVOUS SYSTEM:  Awake and alert; Oriented  to place, person      _________________________________________________  LABS:                        4.7    16.82 )-----------( 472      ( 13 Mar 2023 07:44 )             15.0     03-13    135  |  100  |  18  ----------------------------<  128<H>  3.7   |  26  |  0.43<L>    Ca    7.7<L>      13 Mar 2023 07:20  Phos  3.3     03-13  Mg     1.8     03-13      PTT - ( 13 Mar 2023 07:20 )  PTT:86.2 sec    CAPILLARY BLOOD GLUCOSE            RADIOLOGY & ADDITIONAL TESTS:      Plan of care was discussed with patient and /or primary care giver; all questions and concerns were addressed and care was aligned with patient's wishes.

## 2023-03-13 NOTE — PROGRESS NOTE ADULT - ASSESSMENT
78M with PMHx of IVDU found unresponsive at his nursing home, resolved after Narcan in the field and brought to [facility]. Found to have PE, atrial flutter, and large LV thrombus on ECHOand CTA showing mid SMA with embolus s/p Ex lap, SMA embolectomy, 80cm SBR, abthera vac left in discontinuity (12/11) and transferred to SICU intubated. S/p second look (12/13) and most recently s/p OR for 3rd look, end-to-end anastomosis of remaining bowel, loop ileostomy and abdomen closure (12/15). Prev stepped down to telemetry. LLE ischemia, AKA delayed by nutritional optimization. Transferred to SICU in the setting of sepsis for HD monitoring. 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 Pt w/ resolving tachycardia since OR and transient WBC, surgical site clean and intact w/o signs of infection.     - Local wound care- xeroform, wrap with kerlix and secure with ace wrap daily, changed today  - Remainder of care per primary team  - Vascular will continue to follow call [phone] with any questions or concerns

## 2023-03-13 NOTE — PROGRESS NOTE ADULT - SUBJECTIVE AND OBJECTIVE BOX
INTERVAL HPI/OVERNIGHT EVENTS:    Patient was seen and examined at bedside. Events noted.  Patient with acute drop in hemoglobin     CONSTITUTIONAL:  Negative fever or chills, feels well, good appetite  EYES:  Negative  blurry vision or double vision  CARDIOVASCULAR:  Negative for chest pain or palpitations  RESPIRATORY:  Negative for cough, wheezing, or SOB   GASTROINTESTINAL:  Negative for nausea, vomiting, diarrhea, constipation, or abdominal pain  GENITOURINARY:  Negative frequency, urgency or dysuria  NEUROLOGIC:  No headache, confusion, dizziness, lightheadedness      ANTIBIOTICS/RELEVANT:    MEDICATIONS  (STANDING):  aMIOdarone    Tablet 100 milliGRAM(s) Oral every 24 hours  ascorbic acid 500 milliGRAM(s) Oral daily  chlorhexidine 2% Cloths 1 Application(s) Topical daily  cholestyramine Powder (Sugar-Free) 2 Gram(s) Oral three times a day  DAPTOmycin IVPB 500 milliGRAM(s) IV Intermittent every 24 hours  ertapenem  IVPB 1000 milliGRAM(s) IV Intermittent every 24 hours  fat emulsion (Fish Oil and Plant Based) 20% Infusion 0.7764 Gm/kG/Day (20.83 mL/Hr) IV Continuous <Continuous>  fluconAZOLE IVPB 400 milliGRAM(s) IV Intermittent every 24 hours  influenza  Vaccine (HIGH DOSE) 0.7 milliLiter(s) IntraMuscular once  melatonin Liquid 5 milliGRAM(s) Oral at bedtime  metoprolol tartrate Injectable 5 milliGRAM(s) IV Push every 6 hours  mirtazapine 30 milliGRAM(s) Oral at bedtime  multivitamin 1 Tablet(s) Oral daily  nystatin Powder 1 Application(s) Topical two times a day  pantoprazole  Injectable 40 milliGRAM(s) IV Push daily  Parenteral Nutrition - Adult 1 Each (83 mL/Hr) TPN Continuous <Continuous>  Parenteral Nutrition - Adult 1 Each (83 mL/Hr) TPN Continuous <Continuous>  povidone iodine 10% Solution 1 Application(s) Topical daily  sodium chloride 0.9% lock flush 10 milliLiter(s) IV Push every 8 hours  zinc sulfate 220 milliGRAM(s) Oral daily    MEDICATIONS  (PRN):  acetaminophen     Tablet .. 650 milliGRAM(s) Oral every 6 hours PRN Temp greater or equal to 38C (100.4F), Mild Pain (1 - 3)  lidocaine   4% Patch 1 Patch Transdermal once PRN abd pain  melatonin 3 milliGRAM(s) Oral at bedtime PRN Insomnia  ondansetron Injectable 4 milliGRAM(s) IV Push every 6 hours PRN Nausea and/or Vomiting  sodium chloride 0.9% lock flush 10 milliLiter(s) IV Push every 1 hour PRN Pre/post blood products, medications, blood draw, and to maintain line patency        Vital Signs Last 24 Hrs  T(C): 36.8 (13 Mar 2023 09:21), Max: 37.5 (12 Mar 2023 22:04)  T(F): 98.2 (13 Mar 2023 09:21), Max: 99.5 (12 Mar 2023 22:04)  HR: 106 (13 Mar 2023 08:29) (106 - 112)  BP: 106/60 (13 Mar 2023 08:29) (106/60 - 126/77)  BP(mean): 77 (13 Mar 2023 08:29) (77 - 98)  RR: 17 (13 Mar 2023 08:29) (17 - 18)  SpO2: 99% (13 Mar 2023 08:29) (96% - 99%)    Parameters below as of 13 Mar 2023 08:29  Patient On (Oxygen Delivery Method): room air        PHYSICAL EXAM:  Constitutional:  cachectic, chronically ill appearing  Eyes:JOHN, EOMI  Ear/Nose/Throat: no oral lesion, no sinus tenderness on percussion	  Neck:  supple  Respiratory: CTA keerthi  Cardiovascular: S1S2 RRR, no murmurs  Gastrointestinal:soft, (+) BS, no HSM  Extremities:  s/p AKA   Vascular: DP Pulse:	right normal; left normal      LABS:                        4.7    16.82 )-----------( 472      ( 13 Mar 2023 07:44 )             15.0     03-13    135  |  100  |  18  ----------------------------<  128<H>  3.7   |  26  |  0.43<L>    Ca    7.7<L>      13 Mar 2023 07:20  Phos  3.3     03-13  Mg     1.8     03-13      PTT - ( 13 Mar 2023 07:20 )  PTT:86.2 sec      MICROBIOLOGY:    Culture - Blood (03.08.23 @ 12:21)    Specimen Source: .Blood Blood-Venous    Culture Results:   No growth at 4 days.        RADIOLOGY & ADDITIONAL STUDIES:

## 2023-03-13 NOTE — PROGRESS NOTE ADULT - SUBJECTIVE AND OBJECTIVE BOX
STATUS POST:  Embolectomy, artery, superior mesenteric    Exploratory laparotomy    Exploratory laparotomy with small bowel resection    Small bowel resection with anastomosis    Closure of fascia of abdomen    Insertion, needle, vein    Amputation above knee    Complex reversal of ileostomy        POST OPERATIVE DAY #: 6    SUBJECTIVE:   Patient seen and examined on am rounds. Minimally cooperative with interview. Reporting abd pain. -n-v-cp-sob.    Vital Signs Last 24 Hrs  T(C): 36.6 (13 Mar 2023 04:00), Max: 37.5 (12 Mar 2023 22:04)  T(F): 97.8 (13 Mar 2023 04:00), Max: 99.5 (12 Mar 2023 22:04)  HR: 106 (13 Mar 2023 04:00) (98 - 112)  BP: 122/65 (13 Mar 2023 04:00) (109/78 - 126/77)  BP(mean): 86 (13 Mar 2023 04:00) (86 - 98)  RR: 18 (13 Mar 2023 04:00) (17 - 18)  SpO2: 99% (13 Mar 2023 04:00) (94% - 99%)    Parameters below as of 12 Mar 2023 23:55  Patient On (Oxygen Delivery Method): room air        I&O's Summary    12 Mar 2023 07:01  -  13 Mar 2023 07:00  --------------------------------------------------------  IN: 1188 mL / OUT: 2825 mL / NET: -1637 mL        Physical Exam:  General Appearance: Appears well, NAD  Pulmonary: Nonlabored breathing, no respiratory distress  Cardiovascular: NSR  Abdomen: Soft, nondistended, nontender. Incision repacked, fibrinous exudate noted in wound.  Extremities: WWP, SCD's in place     LABS:                        8.0    20.14 )-----------( 713      ( 12 Mar 2023 09:09 )             25.5     03-12    137  |  101  |  18  ----------------------------<  132<H>  4.1   |  25  |  0.46<L>    Ca    8.8      12 Mar 2023 09:09  Phos  3.6     03-12  Mg     2.1     03-12      PTT - ( 12 Mar 2023 10:21 )  PTT:75.1 sec

## 2023-03-14 NOTE — PROGRESS NOTE ADULT - SUBJECTIVE AND OBJECTIVE BOX
ON: temp 104.2, started second unit of blood, giving 2L of LR, gave tylenol early, BP now 100s, Escalated Erta>>Luis, Fluc>>Capso, added Tobramycin, HR 110s s/p 1L, BP 90-120s, Hgb 9.4, CT Vrads read high grade obstruction 2/2 RLQ TP & unchanged collections, 2nd L given, temp 102.4, refusing cooling blanket, Ofirmev given, 2am hgb 9.2, WBC 33, unclogged NGT -> 600 dark brown out, 3rd L bolus, Mg repleted from 2a labs, 3a UOP dropped to 30  3/13: coffee ground emesis, send to SICU. Temp 103.8 rectal, Blood Cx sent, UA ordered, CXR okay, LR 1L bolus, LA 1.8. CT A/P ordered.      SUBJECTIVE: Patient seen and examined bedside; sleepy this morning, resting comfortably in bed, not participating in exam.    aMIOdarone    Tablet 100 milliGRAM(s) Oral every 24 hours  caspofungin IVPB 50 milliGRAM(s) IV Intermittent every 24 hours  DAPTOmycin IVPB 500 milliGRAM(s) IV Intermittent every 24 hours  meropenem  IVPB 1000 milliGRAM(s) IV Intermittent every 8 hours  meropenem  IVPB 1000 milliGRAM(s) IV Intermittent every 8 hours  metoprolol tartrate Injectable 5 milliGRAM(s) IV Push every 6 hours  tobramycin IVPB 320 milliGRAM(s) IV Intermittent every 24 hours      Vital Signs Last 24 Hrs  T(C): 38.2 (14 Mar 2023 04:30), Max: 39.1 (14 Mar 2023 01:00)  T(F): 100.8 (14 Mar 2023 04:30), Max: 102.4 (14 Mar 2023 01:00)  HR: 111 (14 Mar 2023 06:00) (106 - 150)  BP: 100/59 (14 Mar 2023 06:00) (94/56 - 152/83)  BP(mean): 72 (14 Mar 2023 06:00) (69 - 112)  RR: 14 (14 Mar 2023 06:00) (13 - 28)  SpO2: 100% (14 Mar 2023 06:00) (90% - 100%)    Parameters below as of 14 Mar 2023 07:00  Patient On (Oxygen Delivery Method): room air      I&O's Detail    13 Mar 2023 07:01  -  14 Mar 2023 07:00  --------------------------------------------------------  IN:    Fat Emulsion (Fish Oil &amp; Plant Based) 20% Infusion: 208.2 mL    IV PiggyBack: 50 mL    Lactated Ringers Bolus: 2000 mL    Pantoprazole: 150 mL    PRBCs (Packed Red Blood Cells): 650 mL    TPN (Total Parenteral Nutrition): 1990 mL  Total IN: 5048.2 mL    OUT:    Indwelling Catheter - Urethral (mL): 935 mL    Nasogastric/Oral tube (mL): 2525 mL    Voided (mL): 625 mL  Total OUT: 4085 mL    Total NET: 963.2 mL      PE:    General: NAD, resting comfortably in bed  HEENT: NGT appropriately positioned, with coffee ground fluid in canister  C/V: S1 s2, NSR  Pulm: Nonlabored breathing, no respiratory distress  Abd: Soft, NTND, incision c/d/i, prior ostomy site c/d/i  Extrem: Left stump wrapped with ace        LABS:                        9.2    33.00 )-----------( 477      ( 14 Mar 2023 02:11 )             26.7     03-14    137  |  105  |  25<H>  ----------------------------<  126<H>  4.1   |  24  |  0.49<L>    Ca    8.1<L>      14 Mar 2023 02:11  Phos  3.8     03-14  Mg     1.8     03-14      PT/INR - ( 13 Mar 2023 22:27 )   PT: 17.1 sec;   INR: 1.43          PTT - ( 13 Mar 2023 22:27 )  PTT:33.8 sec      RADIOLOGY & ADDITIONAL STUDIES:

## 2023-03-14 NOTE — PROGRESS NOTE ADULT - ASSESSMENT
79 yo M, prolonged hospital course, multiple abd surgeries and aka      Hospital Course  78M PMHx of IVDU found unresponsive at his nursing home, resolved after Narcan in the field and brought to [facility]. Found to have PE, atrial flutter, and large LV thrombus on echo. Transferred to [facility] for further management. Pt c/o abdominal pain on 12/10 CTA showing mid SMA with embolus. Abnormal distal small bowel loops and cecum with dilatation and pneumatosis suggesting infarcted bowel. Now s/p Ex lap, SMA embolectomy, 80cm SBR, abthera vac left in discontinuity (12/11). S/p second look (12/13), s/p OR for 3rd look, end-to-end anastomosis of remaining bowel, loop ileostomy and abdomen closure (12/15). S/p LORENZO and Cardioversion on 12/30 with cardiology. Patient was nutritionally optimized. 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 s/p L AKA closure (3/1), s/p ileostomy reversal (3/7) c/b ileus with NGT decompression.      GI reconsulted for anemia, concern for coffee ground emesis    Given ongoing infectious process, as well as Hgb stability, and Obstruction on CT scan, plan to closely monitor for role of upper endoscopy for this patient.  Recommend maintaining PPI infusion  NG to LIS  Please repeat and trend Hgb, BUN;CR, and Coags  Infectious workup per primary team / ID     Thank you for the courtesy of this consult. We will follow along with you.    Kurt Zarate M.D.  Gastroenterology Fellow  Weekday Pager: [phone]  Weeknights/Weekend Coverage: Please Call the  for contact info 78M PMHx of IVDU found unresponsive at his nursing home, resolved after Narcan in the field and brought to [facility]. Found to have PE, atrial flutter, and large LV thrombus on echo. Transferred to [facility] for further management. Pt c/o abdominal pain on 12/10 CTA showing mid SMA with embolus. Abnormal distal small bowel loops and cecum with dilatation and pneumatosis suggesting infarcted bowel. Now s/p Ex lap, SMA embolectomy, 80cm SBR, abthera vac left in discontinuity (12/11). S/p second look (12/13), s/p OR for 3rd look, end-to-end anastomosis of remaining bowel, loop ileostomy and abdomen closure (12/15). S/p LORENZO and Cardioversion on 12/30 with cardiology. Patient was nutritionally optimized. 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 s/p L AKA closure (3/1), s/p ileostomy reversal (3/7) c/b ileus with NGT decompression.    GI reconsulted for anemia, concern for coffee ground emesis    Given ongoing infectious process, as well as Hgb stability, and Obstruction on CT scan, plan to closely monitor for role of upper endoscopy for this patient.  Recommend maintaining PPI infusion  NG to LIS  Please repeat and trend Hgb, BUN;CR, and Coags  Infectious workup per primary team / ID     Thank you for the courtesy of this consult. We will follow along with you.    Kurt Zarate M.D.  Gastroenterology Fellow  Weekday Pager: [phone]  Weeknights/Weekend Coverage: Please Call the  for contact info

## 2023-03-14 NOTE — PROGRESS NOTE ADULT - ASSESSMENT
78M with PMHx of IVDU found unresponsive at his nursing home, resolved after Narcan in the field and brought to [facility]. Found to have PE, atrial flutter, and large LV thrombus on ECHOand CTA showing mid SMA with embolus s/p Ex lap, SMA embolectomy, 80cm SBR, abthera vac left in discontinuity (12/11) and transferred to SICU intubated. S/p second look (12/13) and most recently s/p OR for 3rd look, end-to-end anastomosis of remaining bowel, loop ileostomy and abdomen closure (12/15). Prev stepped down to telemetry. LLE ischemia, AKA delayed by nutritional optimization. Transferred to SICU in the setting of sepsis for HD monitoring. 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 Patient upgraded to ICU yesterday for sepsis and concern for GIB. LE amputation site without evidence of infection or bleeding    - Local wound care- xeroform, wrap with kerlix and secure with ace wrap daily, changed today  - Remainder of care per primary team, ICU  - Vascular will continue to follow call [phone] with any questions or concerns     78M with PMHx of IVDU found unresponsive at his nursing home, resolved after Narcan in the field and brought to [facility]. Found to have PE, atrial flutter, and large LV thrombus on ECHOand CTA showing mid SMA with embolus s/p Ex lap, SMA embolectomy, 80cm SBR, abthera vac left in discontinuity (12/11) and transferred to SICU intubated. S/p second look (12/13) and most recently s/p OR for 3rd look, end-to-end anastomosis of remaining bowel, loop ileostomy and abdomen closure (12/15).  LLE ischemia, AKA delayed by nutritional optimization. Transferred to SICU in the setting of sepsis for HD monitoring. 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 Patient upgraded to ICU yesterday for sepsis and concern for GIB. LE amputation site without evidence of infection or bleeding    - Local wound care- xeroform, wrap with kerlix and secure with ace wrap daily, changed today  - Remainder of care per primary team, ICU  - Vascular will continue to follow call [phone] with any questions or concerns

## 2023-03-14 NOTE — PROGRESS NOTE ADULT - ATTENDING COMMENTS
IVDU, AF, multiple thromboemboli with PE, bowel infarction, left AKA, now with worsening fever, leukocytosis, coffee ground emesis, ABLA, SBO  physical as above  H/H now stable  fever could have been from blood tx but with worsening WBC; cultures so far negative  d/w ID and pending cultures continue meropenem/caspofungin/tobramycin, continue daptomycin  CT with small abd wall abscess; IR feels nothing new to drain  PICC site looks clean  serial H/h, hold AC;   GI deferring EGD  decision making of high complexity

## 2023-03-14 NOTE — PROGRESS NOTE ADULT - SUBJECTIVE AND OBJECTIVE BOX
GASTROENTEROLOGY RECONSULT NOTE  Patient seen and examined at bedside. Reconsulted for anemia, dark NG output. No melena. Recieved 2 u prbc and Hgb over-responded. CT with obstruction. Patient is tachycardic but not on pressors or intubated, in ICU. Febrile majority of day with WBC to 35k    PERTINENT REVIEW OF SYSTEMS:  HEENT: No visual changes; No vertigo or throat pain   GASTROINTESTINAL: As above.  NEUROLOGICAL: No numbness or weakness  SKIN: No itching, burning, rashes, or lesions     Allergies    No Known Allergies    Intolerances      MEDICATIONS:  MEDICATIONS  (STANDING):  aMIOdarone    Tablet 100 milliGRAM(s) Oral every 24 hours  ascorbic acid 500 milliGRAM(s) Oral daily  caspofungin IVPB 50 milliGRAM(s) IV Intermittent every 24 hours  chlorhexidine 2% Cloths 1 Application(s) Topical daily  cholestyramine Powder (Sugar-Free) 2 Gram(s) Oral three times a day  DAPTOmycin IVPB 500 milliGRAM(s) IV Intermittent every 24 hours  dextrose 5%. 1000 milliLiter(s) (50 mL/Hr) IV Continuous <Continuous>  dextrose 5%. 1000 milliLiter(s) (100 mL/Hr) IV Continuous <Continuous>  dextrose 50% Injectable 25 Gram(s) IV Push once  dextrose 50% Injectable 12.5 Gram(s) IV Push once  dextrose 50% Injectable 25 Gram(s) IV Push once  fat emulsion (Fish Oil and Plant Based) 20% Infusion 0.7764 Gm/kG/Day (20.83 mL/Hr) IV Continuous <Continuous>  glucagon  Injectable 1 milliGRAM(s) IntraMuscular once  influenza  Vaccine (HIGH DOSE) 0.7 milliLiter(s) IntraMuscular once  insulin lispro (ADMELOG) corrective regimen sliding scale   SubCutaneous every 6 hours  melatonin Liquid 5 milliGRAM(s) Oral at bedtime  meropenem  IVPB 1000 milliGRAM(s) IV Intermittent every 8 hours  metoprolol tartrate Injectable 5 milliGRAM(s) IV Push every 6 hours  mirtazapine 30 milliGRAM(s) Oral at bedtime  multivitamin 1 Tablet(s) Oral daily  nystatin Powder 1 Application(s) Topical two times a day  pantoprazole Infusion 8 mG/Hr (10 mL/Hr) IV Continuous <Continuous>  Parenteral Nutrition - Adult 1 Each (83 mL/Hr) TPN Continuous <Continuous>  Parenteral Nutrition - Adult 1 Each (83 mL/Hr) TPN Continuous <Continuous>  povidone iodine 10% Solution 1 Application(s) Topical daily  sodium chloride 0.9% lock flush 10 milliLiter(s) IV Push every 8 hours  tobramycin IVPB 320 milliGRAM(s) IV Intermittent every 24 hours  zinc sulfate 220 milliGRAM(s) Oral daily    MEDICATIONS  (PRN):  dextrose Oral Gel 15 Gram(s) Oral once PRN Blood Glucose LESS THAN 70 milliGRAM(s)/deciliter  lidocaine   4% Patch 1 Patch Transdermal once PRN abd pain  melatonin 3 milliGRAM(s) Oral at bedtime PRN Insomnia  ondansetron Injectable 4 milliGRAM(s) IV Push every 6 hours PRN Nausea and/or Vomiting  sodium chloride 0.9% lock flush 10 milliLiter(s) IV Push every 1 hour PRN Pre/post blood products, medications, blood draw, and to maintain line patency    Vital Signs Last 24 Hrs  T(C): 37.6 (14 Mar 2023 14:00), Max: 39.1 (14 Mar 2023 01:00)  T(F): 99.7 (14 Mar 2023 14:00), Max: 102.4 (14 Mar 2023 01:00)  HR: 114 (14 Mar 2023 15:00) (110 - 150)  BP: 123/60 (14 Mar 2023 15:00) (94/56 - 152/83)  BP(mean): 82 (14 Mar 2023 15:00) (67 - 112)  RR: 17 (14 Mar 2023 15:00) (11 - 28)  SpO2: 98% (14 Mar 2023 15:00) (90% - 100%)    Parameters below as of 14 Mar 2023 15:00  Patient On (Oxygen Delivery Method): room air        - @ 07:  -   @ 07:00  --------------------------------------------------------  IN: 5198.2 mL / OUT: 4365 mL / NET: 833.2 mL     @ 07:01  -   @ 15:22  --------------------------------------------------------  IN: 1056.4 mL / OUT: 1220 mL / NET: -163.6 mL      PHYSICAL EXAM:    General: lying in bed, appears acutely ill superimposed on chronic illness  HEENT: MMM, conjunctiva and sclera clear, NG in place, dark material  Gastrointestinal: Soft non-tender non-distended; No rebound or guarding  Skin: Warm and dry. No obvious rash    LABS:                        9.2    35.07 )-----------( 457      ( 14 Mar 2023 05:30 )             27.1     03-14    136  |  105  |  21  ----------------------------<  119<H>  4.1   |  25  |  0.48<L>    Ca    8.0<L>      14 Mar 2023 05:30  Phos  3.6     03-14  Mg     1.9     03-14      PT/INR - ( 13 Mar 2023 22:27 )   PT: 17.1 sec;   INR: 1.43          PTT - ( 14 Mar 2023 05:30 )  PTT:29.9 sec      Urinalysis Basic - ( 14 Mar 2023 09:20 )    Color: Yellow / Appearance: Clear / S.020 / pH: x  Gluc: x / Ketone: NEGATIVE  / Bili: Negative / Urobili: 1.0 E.U./dL   Blood: x / Protein: Trace mg/dL / Nitrite: NEGATIVE   Leuk Esterase: NEGATIVE / RBC: < 5 /HPF / WBC < 5 /HPF   Sq Epi: x / Non Sq Epi: 0-5 /HPF / Bacteria: None /HPF                Culture - Blood (collected 13 Mar 2023 17:40)  Source: .Blood Blood  Preliminary Report (14 Mar 2023 07:00):    No growth at 12 hours      RADIOLOGY & ADDITIONAL STUDIES:  Reviewed

## 2023-03-14 NOTE — PROGRESS NOTE ADULT - ASSESSMENT
77M PMHx of IVDU found unresponsive at his nursing home, resolved after Narcan in the field and brought to [facility]. Found to have PE, atrial flutter, and large LV thrombus on echo. Transferred to [facility] for further management. Pt c/o abdominal pain on 12/10 CTA showing mid SMA with embolus. Abnormal distal small bowel loops and cecum with dilatation and pneumatosis suggesting infarcted bowel. Now s/p Ex lap, SMA embolectomy, 80cm SBR, abthera vac left in discontinuity (12/11). S/p second look (12/13), s/p OR for 3rd look, end-to-end anastomosis of remaining bowel, loop ileostomy and abdomen closure (12/15). S/p LOERNZO and Cardioversion on 12/30 with cardiology. Patient was nutritionally optimized. 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 s/p L AKA closure (3/1), s/p ileostomy reversal (3/7) c/b ileus. Now with upper GI bleeding w/ coffee ground emesis. In SICU for hemodynamic monitoring.     Neuro: Tylenol and Oxycodone PRN. Sleep: melotonin PRN, zofran PRN for nausea  CV: Tachycardic likely 2/2 to fever. Repeat echo with EF (02/11) 55-60%. Mildly dilated RV. Maintain MAP >65. A.Fib/Flutter: s/p cardioversion on 12/30. Cont metoprolol/amiodarone; hx HTN: holding spironolactone, Entresto  Pulm: Sating well on RA  GI/FEN: Upper GI bleed: on PPI gtt, SBO: NGT to LIWS, EGD in AM, NPO. s/p ostomy reversal (3/7), PICC w/ TPN and lipids, cont mirtazapine, CT scan with fluid collection by ostomy reversal, SBO, possible colitis - IR consulted  : Kalani, Strict I and O.   Endo: mISS  Heme:  Anemia Hb 9.2 s/p 2uPRBC (02/13), pending 2nd unit. LV thrombis w/ LLE ischemia. Heparin gtt held. Active T&S;   ID: RLQ abscess, LLE ischemia s/p L AKA 2/15; VRE faecium and ESBL Klebsiella BSI (enteric napoleon), Cont: Dapto 2/11-3/9; 3/10--), Tobra (3/13--), Luis (3/13--)  // DC: Zosyn. Vanc (2/10--2/11), Luis (2/10-2/12), Fluconasole (3/9-13), Erta (2/13-3/10; 3/13), f/u ID recs  PPX: SCDs, hold HSQ  L/D/T: PIV  PT/OT: Not ordered  Dispo: SICU

## 2023-03-14 NOTE — CONSULT NOTE ADULT - CONSULT REASON
HF management
Internal medicine co-management
typical AFL
76 y/o M with Significant PMHx of IVDU found unresponsive at his nursing home, resolved after Narcan in the field, and brought to [facility]. Found to have PE, atrial flutter, and large LV thrombus on echo. Transferred to [facility] for further management. Pt C/o abdominal pain on 12/10 CTA showing mid SMA with embolus. Abnormal distal small bowel loops and cecum with dilatation and pneumatosis suggesting infarcted bowel. One or two tiny foci of intrahepatic portal vein pneumatosis. Segmental infarction upper pole left kidney. Now s/p Exlap Embolectomy of SMA and resection of 80cm of SB pt left in discontinuity and transferred to SICU intubated.
Sepsis/hemodynamic monitoring
fevers
sepsis, unknown source
Acute mesenteric ischemia
Pre op
request for
request for RLQ collection drainage
GOLDIE
Troponin elevation
Afib   LV systolic dysfunction  LV thrombus
Re-eval LLE dry gangrene
Tachycardia
gi bleeding
request for RLQ abdominal collection drainage
Cold leg
psychosocial support, SI

## 2023-03-14 NOTE — PROGRESS NOTE ADULT - ASSESSMENT
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  Patient afebrile since drainage.  Fluid cultures from OR with Klebsiella suggesting this is the source of the bacteremia     Now culture with Candida albicans.  Given that this was from OR and he continues to have fever and leukocytosis, recommend treating candida albicans       Patient with new fevers and rising WBC.  These could be due to GI bleed however he does have an undrained collection and is at risk for breakthrough bacteremia and is at risk for MDR bacteria     Recommend:  1.  Continue Daptomycin 500 mg IV daily  2. Stop Ertapenem. Switch to Meropenem 1 gram IV q8hrs  3. Stop Fluconazole.  Start Caspofungin 70 mg IV x 1, then 50 mg IV daily  4.  Check tobramycin level today. If < 2, can give another dose of tobramycin.  Will continue tobramycin while blood cultures are preliminary  5.  IR evaluation for drainage of abdominal fluid collections      ID team 2 will follow

## 2023-03-14 NOTE — CONSULT NOTE ADULT - ASSESSMENT
Assessment: 77M PMHx of IVDU found unresponsive at his nursing home, resolved after Narcan in the field and brought to [facility]. Found to have PE, atrial flutter, and large LV thrombus on echo. Transferred to [facility] for further management. Pt c/o abdominal pain on 12/10 CTA showing mid SMA with embolus. Abnormal distal small bowel loops and cecum with dilatation and pneumatosis suggesting infarcted bowel. Now s/p Ex lap, SMA embolectomy, 80cm SBR, abthera vac left in discontinuity (12/11). S/p second look (12/13), s/p OR for 3rd look, end-to-end anastomosis of remaining bowel, loop ileostomy and abdomen closure (12/15). S/p LORENZO and Cardioversion on 12/30 with cardiology. 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 s/p L AKA closure (3/1), s/p ileostomy reversal (3/7). Patient with noted coffee ground/dark red drainage from NGT, Hgb found to be 4 and 5, upgraded to SICU for HD monitoring.  Patient is tachycardic but not on pressors or intubated, in ICU. Febrile majority of day with WBC to 35k. IR reconsulted for right lower abdominal wall collection aspiration vs drainage. Case reviewed with Dr. Zambrano, abdominal wall collection not amenable to percutaneous drainage. No plan for IR intervention at this time.     Communicated with: Dr. Hatch

## 2023-03-14 NOTE — CHART NOTE - NSCHARTNOTESELECT_GEN_ALL_CORE
Coronavirus Disease 2019 (COVID-19): Caring for Yourself or Others  Stay home.   Stay away from work, school, and public places.  If you need to cough or sneeze, do it into a tissue. Then throw the tissue into the trash. If you don't have tissues, cough or sneeze into the bend of your elbow.  Don’t share food or personal items with people in your household. This includes items like eating and drinking utensils, towels, and bedding.      Self-care at home     Supportive care includes:  Getting rest. This helps your body fight the illness.  Staying hydrated. Drinking liquids is the best way to prevent dehydration. Try to drink 6 to 8 glasses of liquids every day, or as advised by your provider. Also check with your provider about which fluids are best for you. Don't drink fluids that contain caffeine or alcohol.  Taking over-the-counter (OTC) pain medicine. These are used to help ease pain and reduce fever.   Tylenol, ibuprofen, Mucinex or Robitussin DM for the cough, Sudafed for congestion  Monitor your pulse oximetry, if lower than 90%, please contact a medical provider.    When you can stop self-isolation  You have had no fever for at least 24 hours. This means no fever without medicine that reduces fever, such as acetaminophen, for at least 24 hours.  Your symptoms such as cough or trouble breathing have improved.  It has been at least 10 days since your first symptoms started.     When to call your healthcare provider  Any of these:  Trouble breathing  Pain or pressure in chest  Blue tint to lips or face  Fast or irregular heartbeat  Confusion or trouble waking  Fainting or loss of consciousness  Coughing up blood  O2 saturation less than 90%    If your symptoms persist for longer than 10 days, please follow up with a medical provider.     Available liver and kidney function tests results reviewed.     The U.S. Food and Drug Administration (FDA) has issued an Emergency Use  Authorization (EUA) to make Covid  Psychotherapy/Event Note medications available during the COVID-19 pandemic.   Risks, including life-threatening complications, related to the use of these medications discussed with patient.   Patient provided with printed materials to review and opportunities to ask questions provided to patient.     Patient, with full understanding of the risks and benefits associated with use EUA medications, opted for treatment.      Paxlovid instructions provided.

## 2023-03-14 NOTE — CONSULT NOTE ADULT - REASON FOR ADMISSION
A flutter

## 2023-03-14 NOTE — PROGRESS NOTE ADULT - ATTENDING COMMENTS
Patient seen and d/w Dr. Zarate.    GI reconsulted for acute anemia with dark NG tube output. Review of recent imaging notable for concern of obstruction. As hgb now remains stable would continue to monitor while on LIWS and PPI. No acute indication for endoscopy at this time. Keep NPO overnight and GI will continue to follow.

## 2023-03-14 NOTE — CONSULT NOTE ADULT - PROVIDER SPECIALTY LIST ADULT
Cardiology
Vascular Surgery
Cardiology
Electrophysiology
Hospitalist
SICU
SICU
Vascular Surgery
Cardiology
Heart Failure
Infectious Disease
Infectious Disease
SICU
Intervent Radiology
Surgery
Cardiology
Gastroenterology
Nephrology
Intervent Radiology
Intervent Radiology
Palliative Care

## 2023-03-14 NOTE — CONSULT NOTE ADULT - CONSULT REQUESTED DATE/TIME
18-Dec-2022 06:46
08-Dec-2022
10-Feb-2023 17:56
11-Dec-2022 12:37
17-Jan-2023 12:40
25-Jan-2023
11-Dec-2022 05:01
12-Dec-2022 09:09
12-Dec-2022 12:32
13-Feb-2023 12:46
05-Jan-2023 11:59
10-Feb-2023 19:33
11-Dec-2022 17:25
14-Mar-2023 13:49
22-Dec-2022 12:27
07-Dec-2022 17:37
10-Feb-2023 18:22
05-Jan-2023 01:43
27-Jan-2023 13:03
21-Dec-2022 20:57

## 2023-03-14 NOTE — PROGRESS NOTE ADULT - ATTENDING COMMENTS
At time of evaluation, febrile, hemodynamically stable, sinus tachycardia, abdomen soft, mild distension, mild tenderness, drainage from ostomy site    Febrile with leukocytosis, transferred to SICU for concern of sepsis of unclear etiology  Cultures sent, CT repeated, expected post surgical changes including some free abdominal fluid, report states obstruction however there is contrast progression through to the colon, the patient is also reporting passing flatus and had 2 bowel movements recorded  Previously thought intraabdominal abscess that was drained in OR, reviewed with IR and not amenable to percutaneous drainage  GI bleed currently stable, appropriate response to x 2 pRBU, no further bleeding episodes, GI following, deferring endoscopy at this time  Continue NPO, NG, PICC, TPN, holding hep gtt  Broad spectrum antibiotics per SICU and ID

## 2023-03-14 NOTE — PROGRESS NOTE ADULT - SUBJECTIVE AND OBJECTIVE BOX
ON: ON: temp 104.2, started second unit of blood, giving 2L of LR, gave tylenol early, BP now 100s, Escalated Erta>>Luis, Fluc>>Capso, added Tobramycin, HR 110s s/p 1L, BP 90-120s, Hgb 9.4, CT Vrads read high grade obstruction 2/2 RLQ TP & unchanged collections, 2nd L given, temp 102.4, refusing cooling blanket, Ofirmev given, 2am hgb 9.2, WBC 33, unclogged NGT -> 600 dark brown out, 3rd L bolus, Mg repleted from 2a labs, 3a UOP dropped to 30        SUBJECTIVE: Patient reports he is feeling cold. Denies any gas or BMs although BM was recorded overnight.     MEDICATIONS  (STANDING):  aMIOdarone    Tablet 100 milliGRAM(s) Oral every 24 hours  ascorbic acid 500 milliGRAM(s) Oral daily  caspofungin IVPB 50 milliGRAM(s) IV Intermittent every 24 hours  chlorhexidine 2% Cloths 1 Application(s) Topical daily  cholestyramine Powder (Sugar-Free) 2 Gram(s) Oral three times a day  DAPTOmycin IVPB 500 milliGRAM(s) IV Intermittent every 24 hours  dextrose 5%. 1000 milliLiter(s) (50 mL/Hr) IV Continuous <Continuous>  dextrose 5%. 1000 milliLiter(s) (100 mL/Hr) IV Continuous <Continuous>  dextrose 50% Injectable 25 Gram(s) IV Push once  dextrose 50% Injectable 12.5 Gram(s) IV Push once  dextrose 50% Injectable 25 Gram(s) IV Push once  fat emulsion (Fish Oil and Plant Based) 20% Infusion 0.7764 Gm/kG/Day (20.83 mL/Hr) IV Continuous <Continuous>  glucagon  Injectable 1 milliGRAM(s) IntraMuscular once  influenza  Vaccine (HIGH DOSE) 0.7 milliLiter(s) IntraMuscular once  insulin lispro (ADMELOG) corrective regimen sliding scale   SubCutaneous every 6 hours  melatonin Liquid 5 milliGRAM(s) Oral at bedtime  meropenem  IVPB 1000 milliGRAM(s) IV Intermittent every 8 hours  metoprolol tartrate Injectable 5 milliGRAM(s) IV Push every 6 hours  mirtazapine 30 milliGRAM(s) Oral at bedtime  multivitamin 1 Tablet(s) Oral daily  nystatin Powder 1 Application(s) Topical two times a day  pantoprazole Infusion 8 mG/Hr (10 mL/Hr) IV Continuous <Continuous>  Parenteral Nutrition - Adult 1 Each (83 mL/Hr) TPN Continuous <Continuous>  Parenteral Nutrition - Adult 1 Each (83 mL/Hr) TPN Continuous <Continuous>  povidone iodine 10% Solution 1 Application(s) Topical daily  sodium chloride 0.9% lock flush 10 milliLiter(s) IV Push every 8 hours  tobramycin IVPB 320 milliGRAM(s) IV Intermittent every 24 hours  zinc sulfate 220 milliGRAM(s) Oral daily    MEDICATIONS  (PRN):  dextrose Oral Gel 15 Gram(s) Oral once PRN Blood Glucose LESS THAN 70 milliGRAM(s)/deciliter  lidocaine   4% Patch 1 Patch Transdermal once PRN abd pain  melatonin 3 milliGRAM(s) Oral at bedtime PRN Insomnia  ondansetron Injectable 4 milliGRAM(s) IV Push every 6 hours PRN Nausea and/or Vomiting  sodium chloride 0.9% lock flush 10 milliLiter(s) IV Push every 1 hour PRN Pre/post blood products, medications, blood draw, and to maintain line patency      Drips:     ICU Vital Signs Last 24 Hrs  T(C): 38.3 (14 Mar 2023 11:23), Max: 39.1 (14 Mar 2023 01:00)  T(F): 100.9 (14 Mar 2023 11:23), Max: 102.4 (14 Mar 2023 01:00)  HR: 119 (14 Mar 2023 11:00) (110 - 150)  BP: 101/60 (14 Mar 2023 11:00) (94/56 - 152/83)  BP(mean): 76 (14 Mar 2023 11:00) (67 - 112)  ABP: --  ABP(mean): --  RR: 15 (14 Mar 2023 11:00) (13 - 28)  SpO2: 98% (14 Mar 2023 11:00) (90% - 100%)    O2 Parameters below as of 14 Mar 2023 11:00  Patient On (Oxygen Delivery Method): room air            Physical Exam:  General: NAD, resting comfortably  HEENT: NC/AT, neck supple, most mucus membranes  Pulmonary: normal resp effort, CTA-B  Cardiovascular: tachycardic  Abdominal: soft, nondistended (improved), minimally diffusely tender (improved), RLQ with ostomy reversal site with wet to dry dressing, midline incision appears well-healing, NGT w/ coffee ground output  Extremities: LLE w/ amputation - well appearing without purulent output, staples in place, WWP, no clubbing/cyanosis/edema  Neuro: A/O x 3, CNs II-XII grossly intact    Lines/tubes/drains:  Uriarte:	      Vent settings:      I&O's Summary    13 Mar 2023 07:01  -  14 Mar 2023 07:00  --------------------------------------------------------  IN: 5198.2 mL / OUT: 4365 mL / NET: 833.2 mL    14 Mar 2023 07:01  -  14 Mar 2023 11:47  --------------------------------------------------------  IN: 584.4 mL / OUT: 760 mL / NET: -175.6 mL        LABS:                        9.2    35.07 )-----------( 457      ( 14 Mar 2023 05:30 )             27.1     03-14    136  |  105  |  21  ----------------------------<  119<H>  4.1   |  25  |  0.48<L>    Ca    8.0<L>      14 Mar 2023 05:30  Phos  3.6     03-14  Mg     1.9     03-14      PT/INR - ( 13 Mar 2023 22:27 )   PT: 17.1 sec;   INR: 1.43          PTT - ( 14 Mar 2023 05:30 )  PTT:29.9 sec  Urinalysis Basic - ( 14 Mar 2023 09:20 )    Color: Yellow / Appearance: Clear / S.020 / pH: x  Gluc: x / Ketone: NEGATIVE  / Bili: Negative / Urobili: 1.0 E.U./dL   Blood: x / Protein: Trace mg/dL / Nitrite: NEGATIVE   Leuk Esterase: NEGATIVE / RBC: < 5 /HPF / WBC < 5 /HPF   Sq Epi: x / Non Sq Epi: 0-5 /HPF / Bacteria: None /HPF      CAPILLARY BLOOD GLUCOSE      POCT Blood Glucose.: 105 mg/dL (14 Mar 2023 11:11)  POCT Blood Glucose.: 116 mg/dL (14 Mar 2023 07:25)  POCT Blood Glucose.: 117 mg/dL (13 Mar 2023 19:13)        Cultures:Culture Results:   No growth at 12 hours ( @ 17:40)      RADIOLOGY & ADDITIONAL STUDIES:

## 2023-03-14 NOTE — CONSULT NOTE ADULT - CONSULT REQUESTED BY NAME
surgery
Almita Lanier
Dr. Mares
Surgery
CCU
Dr. Lanier
Dr. Lanier
Carmela
Primary team
Team 4, Surgery
Yannick
Dr. Mares
Evita Mcneill NP
Dr. Lanier
SICU
dr [email or web address]
Dr. Lanier
Dr. Lanier
SICU
Dr. Benton

## 2023-03-14 NOTE — PROGRESS NOTE ADULT - ASSESSMENT
77M PMHx of IVDU found unresponsive at his nursing home, resolved after Narcan in the field and brought to [facility]. Found to have PE, atrial flutter, and large LV thrombus on echo. Transferred to [facility] for further management. Pt c/o abdominal pain on 12/10 CTA showing mid SMA with embolus. Abnormal distal small bowel loops and cecum with dilatation and pneumatosis suggesting infarcted bowel. Now s/p Ex lap, SMA embolectomy, 80cm SBR, abthera vac left in discontinuity (12/11). S/p second look (12/13), s/p OR for 3rd look, end-to-end anastomosis of remaining bowel, loop ileostomy and abdomen closure (12/15). S/p LORENZO and Cardioversion on 12/30 with cardiology. Patient was nutritionally optimized. 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 s/p L AKA closure (3/1), s/p ileostomy reversal (3/7). Patient with noted coffee gorund/dark red drainage from NGT, Hgb found to be 4 and 5, upgraded to SICU for HD monitoring.     Plan:    -NPO  -TPN  -NGT to LIWS  -PPI  -F/u GI recs  -F/u vasc recs  -F/u ID recs  -Monitor fever curve  -Cont abx  -IR consult for collection seen on CT  -Rest of care per SICU  -Surgery Team 4C will continue to follow. Please page Team 4 with questions/clinical changes. [phone]

## 2023-03-14 NOTE — CONSULT NOTE ADULT - SUBJECTIVE AND OBJECTIVE BOX
77M PMHx of IVDU found unresponsive at his nursing home, resolved after Narcan in the field and brought to [facility]. Found to have PE, atrial flutter, and large LV thrombus on echo. Transferred to [facility] for further management. Pt c/o abdominal pain on 12/10 CTA showing mid SMA with embolus. Abnormal distal small bowel loops and cecum with dilatation and pneumatosis suggesting infarcted bowel. Now s/p Ex lap, SMA embolectomy, 80cm SBR, abthera vac left in discontinuity (12/11). S/p second look (12/13), s/p OR for 3rd look, end-to-end anastomosis of remaining bowel, loop ileostomy and abdomen closure (12/15). S/p LORENZO and Cardioversion on 12/30 with cardiology. 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 s/p L AKA closure (3/1), s/p ileostomy reversal (3/7). Patient with noted coffee ground/dark red drainage from NGT, Hgb found to be 4 and 5, upgraded to SICU for HD monitoring.  Patient is tachycardic but not on pressors or intubated, in ICU. Febrile majority of day with WBC to 35k. IR reconsulted for right lower abdominal wall collection aspiration vs drainage.     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    History of ileostomy    History of ileostomy    Adjustment disorder    Adjustment disorder with depressed mood    Pulmonary thromboembolism    Atrial flutter    Acute systolic congestive heart failure    Abdominal pain    Encounter for palliative care    Therapeutic opioid induced constipation    Difficulty coping with disease    Heart failure    Advance care planning    Debility    Acute mesenteric ischemia    Septic shock    Limb ischemia    Poor appetite    Hemorrhagic shock    Acute systolic congestive heart failure    Atrial flutter    Mesenteric ischemia    GI bleed    Lower limb ischemia    Severe protein-calorie malnutrition    Difficulty coping with disease    Altered bowel elimination due to intestinal ostomy    Fever    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    Embolectomy, artery, superior mesenteric    Exploratory laparotomy    Exploratory laparotomy with small bowel resection    Small bowel resection with anastomosis    Closure of fascia of abdomen    Insertion, needle, vein    Amputation above knee    Complex reversal of ileostomy    Atrial flutter    Acute systolic congestive heart failure    Single major depressive episode, moderate    Room Service Assist    Room Service Assist    Room Service Assist    Room Service Assist    [email or web address]        Meds:aMIOdarone    Tablet 100 milliGRAM(s) Oral every 24 hours  ascorbic acid 500 milliGRAM(s) Oral daily  caspofungin IVPB 50 milliGRAM(s) IV Intermittent every 24 hours  chlorhexidine 2% Cloths 1 Application(s) Topical daily  cholestyramine Powder (Sugar-Free) 2 Gram(s) Oral three times a day  DAPTOmycin IVPB 500 milliGRAM(s) IV Intermittent every 24 hours  dextrose 5%. 1000 milliLiter(s) IV Continuous <Continuous>  dextrose 5%. 1000 milliLiter(s) IV Continuous <Continuous>  dextrose 50% Injectable 25 Gram(s) IV Push once  dextrose 50% Injectable 12.5 Gram(s) IV Push once  dextrose 50% Injectable 25 Gram(s) IV Push once  dextrose Oral Gel 15 Gram(s) Oral once PRN  fat emulsion (Fish Oil and Plant Based) 20% Infusion 0.7764 Gm/kG/Day IV Continuous <Continuous>  glucagon  Injectable 1 milliGRAM(s) IntraMuscular once  influenza  Vaccine (HIGH DOSE) 0.7 milliLiter(s) IntraMuscular once  insulin lispro (ADMELOG) corrective regimen sliding scale   SubCutaneous every 6 hours  lidocaine   4% Patch 1 Patch Transdermal once PRN  melatonin 3 milliGRAM(s) Oral at bedtime PRN  melatonin Liquid 5 milliGRAM(s) Oral at bedtime  meropenem  IVPB 1000 milliGRAM(s) IV Intermittent every 8 hours  metoprolol tartrate Injectable 5 milliGRAM(s) IV Push every 6 hours  mirtazapine 30 milliGRAM(s) Oral at bedtime  multivitamin 1 Tablet(s) Oral daily  nystatin Powder 1 Application(s) Topical two times a day  ondansetron Injectable 4 milliGRAM(s) IV Push every 6 hours PRN  pantoprazole Infusion 8 mG/Hr IV Continuous <Continuous>  Parenteral Nutrition - Adult 1 Each TPN Continuous <Continuous>  Parenteral Nutrition - Adult 1 Each TPN Continuous <Continuous>  povidone iodine 10% Solution 1 Application(s) Topical daily  sodium chloride 0.9% lock flush 10 milliLiter(s) IV Push every 8 hours  sodium chloride 0.9% lock flush 10 milliLiter(s) IV Push every 1 hour PRN  tobramycin IVPB 320 milliGRAM(s) IV Intermittent every 24 hours  zinc sulfate 220 milliGRAM(s) Oral daily      Allergies:No Known Allergies        Labs:                           8.7    34.15 )-----------( 487      ( 14 Mar 2023 16:15 )             27.6     PT/INR - ( 13 Mar 2023 22:27 )   PT: 17.1 sec;   INR: 1.43          PTT - ( 14 Mar 2023 05:30 )  PTT:29.9 sec  03-14    136  |  105  |  21  ----------------------------<  119<H>  4.1   |  25  |  0.48<L>    Ca    8.0<L>      14 Mar 2023 05:30  Phos  3.6     03-14  Mg     1.9     03-14            Imaging Findings: Unchanged right ventral abdominal wall skin/soft tissue wound and underlying probable fluid collection

## 2023-03-14 NOTE — PROGRESS NOTE ADULT - SUBJECTIVE AND OBJECTIVE BOX
SUBJECTIVE: Patient seen and evaluated.        MEDICATIONS  (STANDING):  aMIOdarone    Tablet 100 milliGRAM(s) Oral every 24 hours  ascorbic acid 500 milliGRAM(s) Oral daily  caspofungin IVPB 50 milliGRAM(s) IV Intermittent every 24 hours  chlorhexidine 2% Cloths 1 Application(s) Topical daily  cholestyramine Powder (Sugar-Free) 2 Gram(s) Oral three times a day  DAPTOmycin IVPB 500 milliGRAM(s) IV Intermittent every 24 hours  dextrose 5%. 1000 milliLiter(s) (50 mL/Hr) IV Continuous <Continuous>  dextrose 5%. 1000 milliLiter(s) (100 mL/Hr) IV Continuous <Continuous>  dextrose 50% Injectable 25 Gram(s) IV Push once  dextrose 50% Injectable 12.5 Gram(s) IV Push once  dextrose 50% Injectable 25 Gram(s) IV Push once  fat emulsion (Fish Oil and Plant Based) 20% Infusion 0.7764 Gm/kG/Day (20.83 mL/Hr) IV Continuous <Continuous>  glucagon  Injectable 1 milliGRAM(s) IntraMuscular once  influenza  Vaccine (HIGH DOSE) 0.7 milliLiter(s) IntraMuscular once  insulin lispro (ADMELOG) corrective regimen sliding scale   SubCutaneous every 6 hours  lactated ringers Bolus 1000 milliLiter(s) IV Bolus once  melatonin Liquid 5 milliGRAM(s) Oral at bedtime  meropenem  IVPB 1000 milliGRAM(s) IV Intermittent every 8 hours  meropenem  IVPB 1000 milliGRAM(s) IV Intermittent every 8 hours  metoprolol tartrate Injectable 5 milliGRAM(s) IV Push every 6 hours  mirtazapine 30 milliGRAM(s) Oral at bedtime  multivitamin 1 Tablet(s) Oral daily  nystatin Powder 1 Application(s) Topical two times a day  pantoprazole Infusion 8 mG/Hr (10 mL/Hr) IV Continuous <Continuous>  Parenteral Nutrition - Adult 1 Each (83 mL/Hr) TPN Continuous <Continuous>  povidone iodine 10% Solution 1 Application(s) Topical daily  sodium chloride 0.9% lock flush 10 milliLiter(s) IV Push every 8 hours  tobramycin IVPB 320 milliGRAM(s) IV Intermittent every 24 hours  zinc sulfate 220 milliGRAM(s) Oral daily    MEDICATIONS  (PRN):  dextrose Oral Gel 15 Gram(s) Oral once PRN Blood Glucose LESS THAN 70 milliGRAM(s)/deciliter  lidocaine   4% Patch 1 Patch Transdermal once PRN abd pain  melatonin 3 milliGRAM(s) Oral at bedtime PRN Insomnia  ondansetron Injectable 4 milliGRAM(s) IV Push every 6 hours PRN Nausea and/or Vomiting  sodium chloride 0.9% lock flush 10 milliLiter(s) IV Push every 1 hour PRN Pre/post blood products, medications, blood draw, and to maintain line patency      Vital Signs Last 24 Hrs  T(C): 38.2 (14 Mar 2023 04:30), Max: 39.1 (14 Mar 2023 01:00)  T(F): 100.8 (14 Mar 2023 04:30), Max: 102.4 (14 Mar 2023 01:00)  HR: 111 (14 Mar 2023 06:00) (106 - 150)  BP: 100/59 (14 Mar 2023 06:00) (94/56 - 152/83)  BP(mean): 72 (14 Mar 2023 06:00) (69 - 112)  RR: 14 (14 Mar 2023 06:00) (13 - 28)  SpO2: 100% (14 Mar 2023 06:00) (90% - 100%)    Parameters below as of 14 Mar 2023 07:00  Patient On (Oxygen Delivery Method): room air        Physical Exam:  General: Emaciated, frail appearing. Awake, alert conversant. In good spirits.   CV: Low grade tachycardia 100-105  Pulm: On room air  Abd: Soft, tender in RLQ under dressing, NGT in place  Extremity: LLE w/ amputation. Wound overall well appearing Staples in place.     I&O's Summary    12 Mar 2023 07:01  -  13 Mar 2023 07:00  --------------------------------------------------------  IN: 1188 mL / OUT: 2825 mL / NET: -1637 mL    13 Mar 2023 07:01  -  14 Mar 2023 06:54  --------------------------------------------------------  IN: 5048.2 mL / OUT: 4085 mL / NET: 963.2 mL        LABS:                        9.2    33.00 )-----------( 477      ( 14 Mar 2023 02:11 )             26.7     03-14    137  |  105  |  25<H>  ----------------------------<  126<H>  4.1   |  24  |  0.49<L>    Ca    8.1<L>      14 Mar 2023 02:11  Phos  3.8     03-14  Mg     1.8     03-14      PT/INR - ( 13 Mar 2023 22:27 )   PT: 17.1 sec;   INR: 1.43          PTT - ( 13 Mar 2023 22:27 )  PTT:33.8 sec    CAPILLARY BLOOD GLUCOSE      POCT Blood Glucose.: 117 mg/dL (13 Mar 2023 19:13)        RADIOLOGY & ADDITIONAL STUDIES:

## 2023-03-14 NOTE — PROGRESS NOTE ADULT - SUBJECTIVE AND OBJECTIVE BOX
INTERVAL HPI/OVERNIGHT EVENTS:    Patient was seen and examined at bedside.  Events noted.  Recurrently febrile and transferred to SICU    ROS:    unable to obtain     ANTIBIOTICS/RELEVANT:    MEDICATIONS  (STANDING):  acetaminophen   IVPB .. 1000 milliGRAM(s) IV Intermittent once  aMIOdarone    Tablet 100 milliGRAM(s) Oral every 24 hours  ascorbic acid 500 milliGRAM(s) Oral daily  caspofungin IVPB 50 milliGRAM(s) IV Intermittent every 24 hours  chlorhexidine 2% Cloths 1 Application(s) Topical daily  cholestyramine Powder (Sugar-Free) 2 Gram(s) Oral three times a day  DAPTOmycin IVPB 500 milliGRAM(s) IV Intermittent every 24 hours  dextrose 5%. 1000 milliLiter(s) (50 mL/Hr) IV Continuous <Continuous>  dextrose 5%. 1000 milliLiter(s) (100 mL/Hr) IV Continuous <Continuous>  dextrose 50% Injectable 25 Gram(s) IV Push once  dextrose 50% Injectable 12.5 Gram(s) IV Push once  dextrose 50% Injectable 25 Gram(s) IV Push once  fat emulsion (Fish Oil and Plant Based) 20% Infusion 0.7764 Gm/kG/Day (20.83 mL/Hr) IV Continuous <Continuous>  glucagon  Injectable 1 milliGRAM(s) IntraMuscular once  influenza  Vaccine (HIGH DOSE) 0.7 milliLiter(s) IntraMuscular once  insulin lispro (ADMELOG) corrective regimen sliding scale   SubCutaneous every 6 hours  melatonin Liquid 5 milliGRAM(s) Oral at bedtime  meropenem  IVPB 1000 milliGRAM(s) IV Intermittent every 8 hours  metoprolol tartrate Injectable 5 milliGRAM(s) IV Push every 6 hours  mirtazapine 30 milliGRAM(s) Oral at bedtime  multivitamin 1 Tablet(s) Oral daily  nystatin Powder 1 Application(s) Topical two times a day  pantoprazole Infusion 8 mG/Hr (10 mL/Hr) IV Continuous <Continuous>  Parenteral Nutrition - Adult 1 Each (83 mL/Hr) TPN Continuous <Continuous>  Parenteral Nutrition - Adult 1 Each (83 mL/Hr) TPN Continuous <Continuous>  povidone iodine 10% Solution 1 Application(s) Topical daily  sodium chloride 0.9% lock flush 10 milliLiter(s) IV Push every 8 hours  tobramycin IVPB 320 milliGRAM(s) IV Intermittent every 24 hours  zinc sulfate 220 milliGRAM(s) Oral daily    MEDICATIONS  (PRN):  dextrose Oral Gel 15 Gram(s) Oral once PRN Blood Glucose LESS THAN 70 milliGRAM(s)/deciliter  lidocaine   4% Patch 1 Patch Transdermal once PRN abd pain  melatonin 3 milliGRAM(s) Oral at bedtime PRN Insomnia  ondansetron Injectable 4 milliGRAM(s) IV Push every 6 hours PRN Nausea and/or Vomiting  sodium chloride 0.9% lock flush 10 milliLiter(s) IV Push every 1 hour PRN Pre/post blood products, medications, blood draw, and to maintain line patency        Vital Signs Last 24 Hrs  T(C): 38.3 (14 Mar 2023 11:23), Max: 39.1 (14 Mar 2023 01:00)  T(F): 100.9 (14 Mar 2023 11:23), Max: 102.4 (14 Mar 2023 01:00)  HR: 119 (14 Mar 2023 11:00) (110 - 150)  BP: 101/60 (14 Mar 2023 11:00) (94/56 - 152/83)  BP(mean): 76 (14 Mar 2023 11:00) (67 - 112)  RR: 15 (14 Mar 2023 11:00) (13 - 28)  SpO2: 98% (14 Mar 2023 11:00) (90% - 100%)    Parameters below as of 14 Mar 2023 11:00  Patient On (Oxygen Delivery Method): room air        PHYSICAL EXAM:  Constitutional:  cachectic, chronically ill appearing   Eyes:  no icterus   Ear/Nose/Throat: no oral lesion   Neck:  supple  Respiratory: CTA keerthi  Cardiovascular: S1S2 RRR, no murmurs  Gastrointestinal:soft, (+) BS, no HSM  Extremities:  s/p left AKA  Vascular: DP Pulse:	right normal; left normal      LABS:                        9.2    35.07 )-----------( 457      ( 14 Mar 2023 05:30 )             27.1     03-14    136  |  105  |  21  ----------------------------<  119<H>  4.1   |  25  |  0.48<L>    Ca    8.0<L>      14 Mar 2023 05:30  Phos  3.6     03-14  Mg     1.9     03-14      PT/INR - ( 13 Mar 2023 22:27 )   PT: 17.1 sec;   INR: 1.43          PTT - ( 14 Mar 2023 05:30 )  PTT:29.9 sec  Urinalysis Basic - ( 14 Mar 2023 09:20 )    Color: Yellow / Appearance: Clear / S.020 / pH: x  Gluc: x / Ketone: NEGATIVE  / Bili: Negative / Urobili: 1.0 E.U./dL   Blood: x / Protein: Trace mg/dL / Nitrite: NEGATIVE   Leuk Esterase: NEGATIVE / RBC: < 5 /HPF / WBC < 5 /HPF   Sq Epi: x / Non Sq Epi: 0-5 /HPF / Bacteria: None /HPF        MICROBIOLOGY:    Culture - Blood (23 @ 17:40)    Specimen Source: .Blood Blood    Culture Results:   No growth at 12 hours        Culture - Body Fluid with Gram Stain (23 @ 14:17)    -  Tobramycin: S <=2    -  Trimethoprim/Sulfamethoxazole: R >2/38    Gram Stain:   No organisms seen  No WBC's seen.    -  Ampicillin: R >16 These ampicillin results predict results for amoxicillin    -  Ampicillin/Sulbactam: R >16/8 Enterobacter, Klebsiella aerogenes, Citrobacter, and Serratia may develop resistance during prolonged therapy (3-4 days)    -  Cefazolin: R >16 Enterobacter, Klebsiella aerogenes, Citrobacter, and Serratia may develop resistance during prolonged therapy (3-4 days)    -  Ceftriaxone: R >32 Enterobacter, Klebsiella aerogenes, Citrobacter, and Serratia may develop resistance during prolonged therapy    -  Ciprofloxacin: R >2    -  Ertapenem: S <=0.5    -  Gentamicin: S <=2    -  Meropenem: S <=1    -  Meropenem: S 1    -  Piperacillin/Tazobactam: R >64    Specimen Source: .Body Fluid Intraabdominal Fluid Collection    Culture Results:   Rare Klebsiella pneumoniae ESBL  Result called to and read back byValentine Lind rn  03/10/2023 14:02:38  Rare Candida albicans    Organism Identification: Klebsiella pneumoniae ESBL  Klebsiella pneumoniae ESBL    Organism: Klebsiella pneumoniae ESBL    Organism: Klebsiella pneumoniae ESBL    Method Type: ETEST    Method Type: SOWMYA    RADIOLOGY & ADDITIONAL STUDIES:    < from: CT Abdomen and Pelvis w/ IV Cont (23 @ 21:00) >  IMPRESSION:  1. Since 2023, persistent small bowel dilatation with   transition point in the right lower quadrant, suspicious for obstruction,   probably dueto adhesions.  2. Unchanged colonic mural edema, moderate ascites and increased   peritoneal lining enhancement, probable colitis and peritonitis.  3. Decreased trace postsurgical air.  4. Unchanged right ventral abdominal wall skin/soft tissue woundand   underlying probable fluid collection.    < end of copied text >

## 2023-03-15 NOTE — PROGRESS NOTE ADULT - SUBJECTIVE AND OBJECTIVE BOX
Interval Events:  Patient seen and examined at bedside.      Allergies    No Known Allergies    Intolerances        Vital Signs Last 24 Hrs  T(C): 37.9 (15 Mar 2023 09:00), Max: 39.2 (15 Mar 2023 06:09)  T(F): 100.2 (15 Mar 2023 09:00), Max: 102.5 (15 Mar 2023 06:09)  HR: 118 (15 Mar 2023 10:00) (109 - 131)  BP: 123/65 (15 Mar 2023 10:00) (95/52 - 142/68)  BP(mean): 87 (15 Mar 2023 10:00) (68 - 96)  RR: 20 (15 Mar 2023 10:00) (11 - 23)  SpO2: 100% (15 Mar 2023 10:00) (98% - 100%)    Parameters below as of 15 Mar 2023 10:00  Patient On (Oxygen Delivery Method): room air         @ 07:  -  03-15 @ 07:00  --------------------------------------------------------  IN: 3288.6 mL / OUT: 3225 mL / NET: 63.6 mL    03-15 @ 07:  -  03-15 @ 11:09  --------------------------------------------------------  IN: 1299.8 mL / OUT: 1000 mL / NET: 299.8 mL       @ 07:01  -  15 @ 07:00  --------------------------------------------------------  IN: 3288.6 mL / OUT: 3225 mL / NET: 63.6 mL    15 @ 07:01  -  15 @ 11:09  --------------------------------------------------------  IN: 1299.8 mL / OUT: 1000 mL / NET: 299.8 mL        Physical Exam:     Gen: NAD, lying on stretcher  Neuro: A&OX3, no focal deficits  CV: RRR, reg S1, S2, no M/G/R  Pulm: CTA b/l, no w/r/r  Abd: Soft, diffusely tender, nondistended  Ext: No C/C/E, WWP  Vasc: + DP in RLE, L AKA present  Skin: no rashes noted, moist mucosa  MSK: No joint swelling  Psych: No signs of anxiety or depression      LABS:      CBC Full  -  ( 15 Mar 2023 05:30 )  WBC Count : 40.04 K/uL  RBC Count : 3.25 M/uL  Hemoglobin : 9.4 g/dL  Hematocrit : 28.8 %  Platelet Count - Automated : 531 K/uL  Mean Cell Volume : 88.6 fl  Mean Cell Hemoglobin : 28.9 pg  Mean Cell Hemoglobin Concentration : 32.6 gm/dL  Auto Neutrophil # : 38.96 K/uL  Auto Lymphocyte # : 0.72 K/uL  Auto Monocyte # : 0.36 K/uL  Auto Eosinophil # : 0.00 K/uL  Auto Basophil # : 0.00 K/uL  Auto Neutrophil % : 97.3 %  Auto Lymphocyte % : 1.8 %  Auto Monocyte % : 0.9 %  Auto Eosinophil % : 0.0 %  Auto Basophil % : 0.0 %    03-15    139  |  105  |  16  ----------------------------<  119<H>  4.4   |  25  |  0.48<L>    Ca    8.4      15 Mar 2023 05:30  Phos  3.8     03-15  Mg     2.4     03-15    TPro  6.1  /  Alb  2.1<L>  /  TBili  0.5  /  DBili  0.2  /  AST  15  /  ALT  19  /  AlkPhos  168<H>  03-15    PT/INR - ( 13 Mar 2023 22:27 )   PT: 17.1 sec;   INR: 1.43          PTT - ( 14 Mar 2023 05:30 )  PTT:29.9 sec      Urinalysis Basic - ( 14 Mar 2023 09:20 )    Color: Yellow / Appearance: Clear / S.020 / pH: x  Gluc: x / Ketone: NEGATIVE  / Bili: Negative / Urobili: 1.0 E.U./dL   Blood: x / Protein: Trace mg/dL / Nitrite: NEGATIVE   Leuk Esterase: NEGATIVE / RBC: < 5 /HPF / WBC < 5 /HPF   Sq Epi: x / Non Sq Epi: 0-5 /HPF / Bacteria: None /HPF              RADIOLOGY & ADDITIONAL STUDIES (The following images were personally reviewed):          A/p: 78yMale Interval Events: Patient with continued fevers overnight. High NGT output repleted.     Subjective Events: Patient seen and examined at bedside. Endorses abdominal pain. ROS otherwise negative       Allergies    No Known Allergies    Intolerances        Vital Signs Last 24 Hrs  T(C): 37.9 (15 Mar 2023 09:00), Max: 39.2 (15 Mar 2023 06:09)  T(F): 100.2 (15 Mar 2023 09:00), Max: 102.5 (15 Mar 2023 06:09)  HR: 118 (15 Mar 2023 10:00) (109 - 131)  BP: 123/65 (15 Mar 2023 10:00) (95/52 - 142/68)  BP(mean): 87 (15 Mar 2023 10:00) (68 - 96)  RR: 20 (15 Mar 2023 10:00) (11 - 23)  SpO2: 100% (15 Mar 2023 10:00) (98% - 100%)    Parameters below as of 15 Mar 2023 10:00  Patient On (Oxygen Delivery Method): room air         @ 07:  -  03-15 @ 07:00  --------------------------------------------------------  IN: 3288.6 mL / OUT: 3225 mL / NET: 63.6 mL    03-15 @ 07:01  -  03-15 @ 11:09  --------------------------------------------------------  IN: 1299.8 mL / OUT: 1000 mL / NET: 299.8 mL       @ 07:01  -  03-15 @ 07:00  --------------------------------------------------------  IN: 3288.6 mL / OUT: 3225 mL / NET: 63.6 mL    03-15 @ 07:01  -  03-15 @ 11:09  --------------------------------------------------------  IN: 1299.8 mL / OUT: 1000 mL / NET: 299.8 mL        Physical Exam:     Gen: NAD, lying on stretcher  Neuro: A&OX3, no focal deficits  CV: RRR, reg S1, S2, no M/G/R  Pulm: CTA b/l, no w/r/r  Abd: Soft, diffusely tender, nondistended  Ext: No C/C/E, WWP  Vasc: + DP in RLE, L AKA present  Skin: no rashes noted, moist mucosa  MSK: No joint swelling  Psych: No signs of anxiety or depression      LABS:      CBC Full  -  ( 15 Mar 2023 05:30 )  WBC Count : 40.04 K/uL  RBC Count : 3.25 M/uL  Hemoglobin : 9.4 g/dL  Hematocrit : 28.8 %  Platelet Count - Automated : 531 K/uL  Mean Cell Volume : 88.6 fl  Mean Cell Hemoglobin : 28.9 pg  Mean Cell Hemoglobin Concentration : 32.6 gm/dL  Auto Neutrophil # : 38.96 K/uL  Auto Lymphocyte # : 0.72 K/uL  Auto Monocyte # : 0.36 K/uL  Auto Eosinophil # : 0.00 K/uL  Auto Basophil # : 0.00 K/uL  Auto Neutrophil % : 97.3 %  Auto Lymphocyte % : 1.8 %  Auto Monocyte % : 0.9 %  Auto Eosinophil % : 0.0 %  Auto Basophil % : 0.0 %    03-15    139  |  105  |  16  ----------------------------<  119<H>  4.4   |  25  |  0.48<L>    Ca    8.4      15 Mar 2023 05:30  Phos  3.8     03-15  Mg     2.4     03-15    TPro  6.1  /  Alb  2.1<L>  /  TBili  0.5  /  DBili  0.2  /  AST  15  /  ALT  19  /  AlkPhos  168<H>  03-15    PT/INR - ( 13 Mar 2023 22:27 )   PT: 17.1 sec;   INR: 1.43          PTT - ( 14 Mar 2023 05:30 )  PTT:29.9 sec      Urinalysis Basic - ( 14 Mar 2023 09:20 )    Color: Yellow / Appearance: Clear / S.020 / pH: x  Gluc: x / Ketone: NEGATIVE  / Bili: Negative / Urobili: 1.0 E.U./dL   Blood: x / Protein: Trace mg/dL / Nitrite: NEGATIVE   Leuk Esterase: NEGATIVE / RBC: < 5 /HPF / WBC < 5 /HPF   Sq Epi: x / Non Sq Epi: 0-5 /HPF / Bacteria: None /HPF           Interval Events: Patient with continued fevers overnight. High NGT output repleted.     Subjective Events: Patient seen and examined at bedside. Endorses abdominal pain. ROS otherwise negative       Allergies    No Known Allergies    Intolerances        Vital Signs Last 24 Hrs  T(C): 37.9 (15 Mar 2023 09:00), Max: 39.2 (15 Mar 2023 06:09)  T(F): 100.2 (15 Mar 2023 09:00), Max: 102.5 (15 Mar 2023 06:09)  HR: 118 (15 Mar 2023 10:00) (109 - 131)  BP: 123/65 (15 Mar 2023 10:00) (95/52 - 142/68)  BP(mean): 87 (15 Mar 2023 10:00) (68 - 96)  RR: 20 (15 Mar 2023 10:00) (11 - 23)  SpO2: 100% (15 Mar 2023 10:00) (98% - 100%)    Parameters below as of 15 Mar 2023 10:00  Patient On (Oxygen Delivery Method): room air         @ 07:  -  03-15 @ 07:00  --------------------------------------------------------  IN: 3288.6 mL / OUT: 3225 mL / NET: 63.6 mL    03-15 @ 07:01  -  03-15 @ 11:09  --------------------------------------------------------  IN: 1299.8 mL / OUT: 1000 mL / NET: 299.8 mL       @ 07:01  -  03-15 @ 07:00  --------------------------------------------------------  IN: 3288.6 mL / OUT: 3225 mL / NET: 63.6 mL    03-15 @ 07:01  -  03-15 @ 11:09  --------------------------------------------------------  IN: 1299.8 mL / OUT: 1000 mL / NET: 299.8 mL        Physical Exam:     Gen: NAD, lying on stretcher  Neuro: A&OX3, no focal deficits  CV: RRR, reg S1, S2, no M/G/R  Pulm: CTA b/l, no w/r/r  Abd: Soft, diffusely tender, nondistended  Ext: No C/C/E, WWP  Vasc: + DP in RLE, L AKA present  Skin: no rashes noted, moist mucosa  MSK: No joint swelling, right dry gangrene  Psych: No signs of anxiety or depression      LABS:      CBC Full  -  ( 15 Mar 2023 05:30 )  WBC Count : 40.04 K/uL  RBC Count : 3.25 M/uL  Hemoglobin : 9.4 g/dL  Hematocrit : 28.8 %  Platelet Count - Automated : 531 K/uL  Mean Cell Volume : 88.6 fl  Mean Cell Hemoglobin : 28.9 pg  Mean Cell Hemoglobin Concentration : 32.6 gm/dL  Auto Neutrophil # : 38.96 K/uL  Auto Lymphocyte # : 0.72 K/uL  Auto Monocyte # : 0.36 K/uL  Auto Eosinophil # : 0.00 K/uL  Auto Basophil # : 0.00 K/uL  Auto Neutrophil % : 97.3 %  Auto Lymphocyte % : 1.8 %  Auto Monocyte % : 0.9 %  Auto Eosinophil % : 0.0 %  Auto Basophil % : 0.0 %    03-15    139  |  105  |  16  ----------------------------<  119<H>  4.4   |  25  |  0.48<L>    Ca    8.4      15 Mar 2023 05:30  Phos  3.8     03-15  Mg     2.4     03-15    TPro  6.1  /  Alb  2.1<L>  /  TBili  0.5  /  DBili  0.2  /  AST  15  /  ALT  19  /  AlkPhos  168<H>  03-15    PT/INR - ( 13 Mar 2023 22:27 )   PT: 17.1 sec;   INR: 1.43          PTT - ( 14 Mar 2023 05:30 )  PTT:29.9 sec      Urinalysis Basic - ( 14 Mar 2023 09:20 )    Color: Yellow / Appearance: Clear / S.020 / pH: x  Gluc: x / Ketone: NEGATIVE  / Bili: Negative / Urobili: 1.0 E.U./dL   Blood: x / Protein: Trace mg/dL / Nitrite: NEGATIVE   Leuk Esterase: NEGATIVE / RBC: < 5 /HPF / WBC < 5 /HPF   Sq Epi: x / Non Sq Epi: 0-5 /HPF / Bacteria: None /HPF

## 2023-03-15 NOTE — PROGRESS NOTE ADULT - SUBJECTIVE AND OBJECTIVE BOX
ON: tylenol @ 1700, temp climbed to 101.4, BCx NGTD 1d, MN temp 101.9, ofirmev given, AM temp 102.5, ofirmev  3/14: UA neg, FeUrea 52.3, temp 100.9 - gave ofirmev, ID approved abx, reordered TPN, Bl cx - NGTD, tobra level 1.3 - so will give another dose (per ID note), possible BM? - patient denied, IR - nothing to drain, abscess in abd wall draining/small, no EGD today, temp 100.7 - gave tylenol, repeat CBC 8.7 (9.2), NGT output 700 - gave 500cc bolus, worked with PT       SUBJECTIVE: Patient seen and examined bedside; sleepy but arousable, feeling tired this morning, no other complaints.    aMIOdarone    Tablet 100 milliGRAM(s) Oral every 24 hours  caspofungin IVPB 50 milliGRAM(s) IV Intermittent every 24 hours  DAPTOmycin IVPB 500 milliGRAM(s) IV Intermittent every 24 hours  meropenem  IVPB 1000 milliGRAM(s) IV Intermittent every 8 hours  metoprolol tartrate Injectable 5 milliGRAM(s) IV Push every 6 hours  tobramycin IVPB 320 milliGRAM(s) IV Intermittent every 24 hours      Vital Signs Last 24 Hrs  T(C): 37.3 (15 Mar 2023 17:55), Max: 39.2 (15 Mar 2023 06:09)  T(F): 99.2 (15 Mar 2023 17:55), Max: 102.5 (15 Mar 2023 06:09)  HR: 102 (15 Mar 2023 18:00) (102 - 131)  BP: 115/66 (15 Mar 2023 18:00) (95/59 - 142/68)  BP(mean): 85 (15 Mar 2023 18:00) (68 - 96)  RR: 18 (15 Mar 2023 18:00) (12 - 23)  SpO2: 99% (15 Mar 2023 18:00) (97% - 100%)    Parameters below as of 15 Mar 2023 18:00  Patient On (Oxygen Delivery Method): room air      I&O's Detail    14 Mar 2023 07:01  -  15 Mar 2023 07:00  --------------------------------------------------------  IN:    Fat Emulsion (Fish Oil &amp; Plant Based) 20% Infusion: 62.4 mL    Fat Emulsion (Fish Oil &amp; Plant Based) 20% Infusion: 187.2 mL    IV PiggyBack: 100 mL    IV PiggyBack: 200 mL    IV PiggyBack: 500 mL    IV PiggyBack: 100 mL    Pantoprazole: 230 mL    TPN (Total Parenteral Nutrition): 1909 mL  Total IN: 3288.6 mL    OUT:    Indwelling Catheter - Urethral (mL): 1425 mL    Nasogastric/Oral tube (mL): 1800 mL  Total OUT: 3225 mL    Total NET: 63.6 mL      15 Mar 2023 07:01  -  15 Mar 2023 18:54  --------------------------------------------------------  IN:    Fat Emulsion (Fish Oil &amp; Plant Based) 20% Infusion: 20.8 mL    Fat Emulsion (Fish Oil &amp; Plant Based) 20% Infusion: 62.4 mL    IV PiggyBack: 50 mL    IV PiggyBack: 100 mL    Pantoprazole: 120 mL    Sodium Chloride 0.9% Bolus: 1000 mL    Sodium Chloride 0.9% Bolus: 500 mL    TPN (Total Parenteral Nutrition): 996 mL  Total IN: 2849.2 mL    OUT:    Indwelling Catheter - Urethral (mL): 815 mL    Nasogastric/Oral tube (mL): 1300 mL  Total OUT: 2115 mL    Total NET: 734.2 mL      PE:    General: Sleepy, NAD, resting comfortably in bed  C/V: S1 s2, NSR, tachycardic  Pulm: Nonlabored breathing, no respiratory distress  Abd: Soft, NTND, prior ostomy site with no obvious bleeding or signs of infection, repacked with 4x4  Extrem: Left LE stump wrapped with ace        LABS:                        9.4    40.04 )-----------( 531      ( 15 Mar 2023 05:30 )             28.8     03-15    139  |  105  |  16  ----------------------------<  119<H>  4.4   |  25  |  0.48<L>    Ca    8.4      15 Mar 2023 05:30  Phos  3.8     03-15  Mg     2.4     03-15    TPro  6.1  /  Alb  2.1<L>  /  TBili  0.5  /  DBili  0.2  /  AST  15  /  ALT  19  /  AlkPhos  168<H>  -15    PT/INR - ( 13 Mar 2023 22:27 )   PT: 17.1 sec;   INR: 1.43          PTT - ( 14 Mar 2023 05:30 )  PTT:29.9 sec  Urinalysis Basic - ( 14 Mar 2023 09:20 )    Color: Yellow / Appearance: Clear / S.020 / pH: x  Gluc: x / Ketone: NEGATIVE  / Bili: Negative / Urobili: 1.0 E.U./dL   Blood: x / Protein: Trace mg/dL / Nitrite: NEGATIVE   Leuk Esterase: NEGATIVE / RBC: < 5 /HPF / WBC < 5 /HPF   Sq Epi: x / Non Sq Epi: 0-5 /HPF / Bacteria: None /HPF        RADIOLOGY & ADDITIONAL STUDIES:

## 2023-03-15 NOTE — PROGRESS NOTE ADULT - ASSESSMENT
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  Patient afebrile since drainage.  Fluid cultures from OR with Klebsiella suggesting this is the source of the bacteremia     Now culture with Candida albicans.  Given that this was from OR and he continues to have fever and leukocytosis, recommend treating candida albicans     Patient with new fevers and rising WBC.  These could be due to GI bleed however he does have an undrained collection and is at risk for breakthrough bacteremia.  I don't believe broadening antibiotics in the absence of source control will be beneficial as it will only lead to more resistance     Recommend:  1.  Continue Daptomycin 500 mg IV daily  2.  Continue Meropenem 1 gram IV q8hrs  3.  Continue Caspofungin 70 mg IV x 1, then 50 mg IV daily  4.  Check tobramycin level today. If < 2, can give another dose of tobramycin.  Will continue tobramycin while blood cultures are preliminary      ID team 2 will follow.  Starting on 3/16/23, Dr. Stewart (Team 1) will follow this patient

## 2023-03-15 NOTE — PROGRESS NOTE ADULT - SUBJECTIVE AND OBJECTIVE BOX
GASTROENTEROLOGY PROGRESS NOTE  Patient seen and examined at bedside. Reports note feeling well. 300 cc bilious appearing fluid out since 7am-9am in NG ; no BM's. Hgb stable and not on pressors. WBC continues to increase    PERTINENT REVIEW OF SYSTEMS:  CONSTITUTIONAL: No weakness, fevers or chills  HEENT: No visual changes; No vertigo or throat pain   GASTROINTESTINAL: As above.  NEUROLOGICAL: No numbness or weakness  SKIN: No itching, burning, rashes, or lesions     Allergies    No Known Allergies    Intolerances      MEDICATIONS:  MEDICATIONS  (STANDING):  aMIOdarone    Tablet 100 milliGRAM(s) Oral every 24 hours  ascorbic acid 500 milliGRAM(s) Oral daily  caspofungin IVPB 50 milliGRAM(s) IV Intermittent every 24 hours  chlorhexidine 2% Cloths 1 Application(s) Topical daily  cholestyramine Powder (Sugar-Free) 2 Gram(s) Oral three times a day  DAPTOmycin IVPB 500 milliGRAM(s) IV Intermittent every 24 hours  dextrose 5%. 1000 milliLiter(s) (50 mL/Hr) IV Continuous <Continuous>  dextrose 5%. 1000 milliLiter(s) (100 mL/Hr) IV Continuous <Continuous>  dextrose 50% Injectable 25 Gram(s) IV Push once  dextrose 50% Injectable 12.5 Gram(s) IV Push once  dextrose 50% Injectable 25 Gram(s) IV Push once  fat emulsion (Fish Oil and Plant Based) 20% Infusion 0.7764 Gm/kG/Day (20.83 mL/Hr) IV Continuous <Continuous>  fat emulsion (Fish Oil and Plant Based) 20% Infusion 0.7764 Gm/kG/Day (20.83 mL/Hr) IV Continuous <Continuous>  glucagon  Injectable 1 milliGRAM(s) IntraMuscular once  influenza  Vaccine (HIGH DOSE) 0.7 milliLiter(s) IntraMuscular once  insulin lispro (ADMELOG) corrective regimen sliding scale   SubCutaneous every 6 hours  melatonin Liquid 5 milliGRAM(s) Oral at bedtime  meropenem  IVPB 1000 milliGRAM(s) IV Intermittent every 8 hours  metoprolol tartrate Injectable 5 milliGRAM(s) IV Push every 6 hours  mirtazapine 30 milliGRAM(s) Oral at bedtime  multivitamin 1 Tablet(s) Oral daily  nystatin Powder 1 Application(s) Topical two times a day  pantoprazole Infusion 8 mG/Hr (10 mL/Hr) IV Continuous <Continuous>  Parenteral Nutrition - Adult 1 Each (83 mL/Hr) TPN Continuous <Continuous>  Parenteral Nutrition - Adult 1 Each (83 mL/Hr) TPN Continuous <Continuous>  povidone iodine 10% Solution 1 Application(s) Topical daily  sodium chloride 0.9% lock flush 10 milliLiter(s) IV Push every 8 hours  tobramycin IVPB 320 milliGRAM(s) IV Intermittent every 24 hours  zinc sulfate 220 milliGRAM(s) Oral daily    MEDICATIONS  (PRN):  dextrose Oral Gel 15 Gram(s) Oral once PRN Blood Glucose LESS THAN 70 milliGRAM(s)/deciliter  lidocaine   4% Patch 1 Patch Transdermal once PRN abd pain  melatonin 3 milliGRAM(s) Oral at bedtime PRN Insomnia  ondansetron Injectable 4 milliGRAM(s) IV Push every 6 hours PRN Nausea and/or Vomiting  sodium chloride 0.9% lock flush 10 milliLiter(s) IV Push every 1 hour PRN Pre/post blood products, medications, blood draw, and to maintain line patency    Vital Signs Last 24 Hrs  T(C): 37.9 (15 Mar 2023 09:00), Max: 39.2 (15 Mar 2023 06:09)  T(F): 100.2 (15 Mar 2023 09:00), Max: 102.5 (15 Mar 2023 06:09)  HR: 117 (15 Mar 2023 09:00) (109 - 131)  BP: 123/64 (15 Mar 2023 09:00) (95/52 - 142/68)  BP(mean): 87 (15 Mar 2023 09:00) (67 - 96)  RR: 21 (15 Mar 2023 09:00) (11 - 23)  SpO2: 100% (15 Mar 2023 09:00) (98% - 100%)    Parameters below as of 15 Mar 2023 09:00  Patient On (Oxygen Delivery Method): room air         @ 07:01  -  03-15 @ 07:00  --------------------------------------------------------  IN: 3288.6 mL / OUT: 3225 mL / NET: 63.6 mL    03-15 @ 07:01  -  03-15 @ 09:20  --------------------------------------------------------  IN: 206.8 mL / OUT: 435 mL / NET: -228.2 mL      PHYSICAL EXAM:    General: lying in bed, cachectic, NG tube in place  HEENT: poor dentition, NG tube  Gastrointestinal: Soft , tender lower quadrants, non-distended; No rebound or guarding  Skin: Warm and dry. No obvious rash    LABS:                        9.4    40.04 )-----------( 531      ( 15 Mar 2023 05:30 )             28.8     03-15    139  |  105  |  16  ----------------------------<  119<H>  4.4   |  25  |  0.48<L>    Ca    8.4      15 Mar 2023 05:30  Phos  3.8     03-15  Mg     2.4     03-15    TPro  6.1  /  Alb  2.1<L>  /  TBili  0.5  /  DBili  0.2  /  AST  15  /  ALT  19  /  AlkPhos  168<H>  03-15    PT/INR - ( 13 Mar 2023 22:27 )   PT: 17.1 sec;   INR: 1.43          PTT - ( 14 Mar 2023 05:30 )  PTT:29.9 sec      Urinalysis Basic - ( 14 Mar 2023 09:20 )    Color: Yellow / Appearance: Clear / S.020 / pH: x  Gluc: x / Ketone: NEGATIVE  / Bili: Negative / Urobili: 1.0 E.U./dL   Blood: x / Protein: Trace mg/dL / Nitrite: NEGATIVE   Leuk Esterase: NEGATIVE / RBC: < 5 /HPF / WBC < 5 /HPF   Sq Epi: x / Non Sq Epi: 0-5 /HPF / Bacteria: None /HPF                Culture - Blood (collected 13 Mar 2023 17:40)  Source: .Blood Blood  Preliminary Report (14 Mar 2023 19:01):    No growth at 1 day.      RADIOLOGY & ADDITIONAL STUDIES:  Reviewed

## 2023-03-15 NOTE — PROGRESS NOTE ADULT - ATTENDING COMMENTS
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

## 2023-03-15 NOTE — PROGRESS NOTE ADULT - ASSESSMENT
78M PMHx of IVDU found unresponsive at his nursing home, resolved after Narcan in the field and brought to [place]. Found to have PE, atrial flutter, and large LV thrombus on echo. Transferred to [facility] for further management. Pt c/o abdominal pain on 12/10 CTA showing mid SMA with embolus. Abnormal distal small bowel loops and cecum with dilatation and pneumatosis suggesting infarcted bowel. Now s/p Ex lap, SMA embolectomy, 80cm SBR, abthera vac left in discontinuity (12/11). S/p second look (12/13), s/p OR for 3rd look, end-to-end anastomosis of remaining bowel, loop ileostomy and abdomen closure (12/15). S/p LORENZO and Cardioversion on 12/30 with cardiology. Patient was nutritionally optimized. 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 s/p L AKA closure (3/1), s/p ileostomy reversal (3/7) c/b ileus with NGT decompression.    GI reconsulted for anemia, concern for coffee ground emesis    Noted impressive leukocytosis continuing to increase, without overt GI bleeding and stable HgB    Given ongoing infectious process, as well as Hgb stability, and Obstruction on CT scan, currently deferring on endoscopic eval  Recommend maintaining PPI infusion for additional 24 hours then PPI BID IV  NG to LIS  Please trend Hgb, BUN;CR, and Coags  Infectious workup per primary team / ID     Thank you for the courtesy of this consult.    Kurt Zarate M.D.  Gastroenterology Fellow  Weekday Pager: [phone]  Weeknights/Weekend Coverage: Please Call the  for contact info

## 2023-03-15 NOTE — PROGRESS NOTE ADULT - SUBJECTIVE AND OBJECTIVE BOX
INTERVAL HPI/OVERNIGHT EVENTS:    Patient was seen and examined at bedside.  Events noted    ROS:    unable to obtain           ANTIBIOTICS/RELEVANT:    MEDICATIONS  (STANDING):  aMIOdarone    Tablet 100 milliGRAM(s) Oral every 24 hours  ascorbic acid 500 milliGRAM(s) Oral daily  caspofungin IVPB 50 milliGRAM(s) IV Intermittent every 24 hours  chlorhexidine 2% Cloths 1 Application(s) Topical daily  cholestyramine Powder (Sugar-Free) 2 Gram(s) Oral three times a day  DAPTOmycin IVPB 500 milliGRAM(s) IV Intermittent every 24 hours  dextrose 5%. 1000 milliLiter(s) (50 mL/Hr) IV Continuous <Continuous>  dextrose 5%. 1000 milliLiter(s) (100 mL/Hr) IV Continuous <Continuous>  dextrose 50% Injectable 25 Gram(s) IV Push once  dextrose 50% Injectable 12.5 Gram(s) IV Push once  dextrose 50% Injectable 25 Gram(s) IV Push once  fat emulsion (Fish Oil and Plant Based) 20% Infusion 0.7764 Gm/kG/Day (20.83 mL/Hr) IV Continuous <Continuous>  glucagon  Injectable 1 milliGRAM(s) IntraMuscular once  influenza  Vaccine (HIGH DOSE) 0.7 milliLiter(s) IntraMuscular once  insulin lispro (ADMELOG) corrective regimen sliding scale   SubCutaneous every 6 hours  melatonin Liquid 5 milliGRAM(s) Oral at bedtime  meropenem  IVPB 1000 milliGRAM(s) IV Intermittent every 8 hours  metoprolol tartrate Injectable 5 milliGRAM(s) IV Push every 6 hours  mirtazapine 30 milliGRAM(s) Oral at bedtime  multivitamin 1 Tablet(s) Oral daily  nystatin Powder 1 Application(s) Topical two times a day  pantoprazole Infusion 8 mG/Hr (10 mL/Hr) IV Continuous <Continuous>  Parenteral Nutrition - Adult 1 Each (83 mL/Hr) TPN Continuous <Continuous>  Parenteral Nutrition - Adult 1 Each (83 mL/Hr) TPN Continuous <Continuous>  povidone iodine 10% Solution 1 Application(s) Topical daily  sodium chloride 0.9% lock flush 10 milliLiter(s) IV Push every 8 hours  tobramycin IVPB 320 milliGRAM(s) IV Intermittent every 24 hours  zinc sulfate 220 milliGRAM(s) Oral daily    MEDICATIONS  (PRN):  dextrose Oral Gel 15 Gram(s) Oral once PRN Blood Glucose LESS THAN 70 milliGRAM(s)/deciliter  lidocaine   4% Patch 1 Patch Transdermal once PRN abd pain  melatonin 3 milliGRAM(s) Oral at bedtime PRN Insomnia  ondansetron Injectable 4 milliGRAM(s) IV Push every 6 hours PRN Nausea and/or Vomiting  sodium chloride 0.9% lock flush 10 milliLiter(s) IV Push every 1 hour PRN Pre/post blood products, medications, blood draw, and to maintain line patency        Vital Signs Last 24 Hrs  T(C): 37.3 (15 Mar 2023 17:55), Max: 39.2 (15 Mar 2023 06:09)  T(F): 99.2 (15 Mar 2023 17:55), Max: 102.5 (15 Mar 2023 06:09)  HR: 109 (15 Mar 2023 17:00) (109 - 131)  BP: 116/65 (15 Mar 2023 17:00) (95/59 - 142/68)  BP(mean): 84 (15 Mar 2023 17:00) (68 - 96)  RR: 18 (15 Mar 2023 17:00) (12 - 23)  SpO2: 100% (15 Mar 2023 17:00) (97% - 100%)    Parameters below as of 15 Mar 2023 17:00  Patient On (Oxygen Delivery Method): room air        PHYSICAL EXAM:  Constitutional:  cachectic, chronically ill appearing   Eyes:JOHN, EOMI  Ear/Nose/Throat: no oral lesion   Neck:  supple  Respiratory: CTA keerthi  Cardiovascular: S1S2 RRR, no murmurs  Gastrointestinal:soft, (+) BS, no HSM  Extremities:no e/e/c  Vascular: DP Pulse:	right normal; left normal      LABS:                        9.4    40.04 )-----------( 531      ( 15 Mar 2023 05:30 )             28.8     03-15    139  |  105  |  16  ----------------------------<  119<H>  4.4   |  25  |  0.48<L>    Ca    8.4      15 Mar 2023 05:30  Phos  3.8     03-15  Mg     2.4     03-15    TPro  6.1  /  Alb  2.1<L>  /  TBili  0.5  /  DBili  0.2  /  AST  15  /  ALT  19  /  AlkPhos  168<H>  03-15    PT/INR - ( 13 Mar 2023 22:27 )   PT: 17.1 sec;   INR: 1.43          PTT - ( 14 Mar 2023 05:30 )  PTT:29.9 sec  Urinalysis Basic - ( 14 Mar 2023 09:20 )    Color: Yellow / Appearance: Clear / S.020 / pH: x  Gluc: x / Ketone: NEGATIVE  / Bili: Negative / Urobili: 1.0 E.U./dL   Blood: x / Protein: Trace mg/dL / Nitrite: NEGATIVE   Leuk Esterase: NEGATIVE / RBC: < 5 /HPF / WBC < 5 /HPF   Sq Epi: x / Non Sq Epi: 0-5 /HPF / Bacteria: None /HPF        MICROBIOLOGY:    Culture - Blood (23 @ 17:40)    Specimen Source: .Blood Blood    Culture Results:   No growth at 1 day.        RADIOLOGY & ADDITIONAL STUDIES:    < from: CT Abdomen and Pelvis w/ IV Cont (23 @ 21:00) >  IMPRESSION:  1. Since 2023, persistent small bowel dilatation with   transition point in the right lower quadrant, suspicious for obstruction,   probably dueto adhesions.  2. Unchanged colonic mural edema, moderate ascites and increased   peritoneal lining enhancement, probable colitis and peritonitis.  3. Decreased trace postsurgical air.  4. Unchanged right ventral abdominal wall skin/soft tissue woundand   underlying probable fluid collection.

## 2023-03-15 NOTE — PROGRESS NOTE ADULT - ASSESSMENT
77M PMHx of IVDU found unresponsive at his nursing home, resolved after Narcan in the field and brought to [facility]. Found to have PE, atrial flutter, and large LV thrombus on echo. Transferred to [facility] for further management. Pt c/o abdominal pain on 12/10 CTA showing mid SMA with embolus. Abnormal distal small bowel loops and cecum with dilatation and pneumatosis suggesting infarcted bowel. Now s/p Ex lap, SMA embolectomy, 80cm SBR, abthera vac left in discontinuity (12/11). S/p second look (12/13), s/p OR for 3rd look, end-to-end anastomosis of remaining bowel, loop ileostomy and abdomen closure (12/15). S/p LORENZO and Cardioversion on 12/30 with cardiology. Patient was nutritionally optimized. 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 s/p L AKA closure (3/1), s/p ileostomy reversal (3/7) c/b ileus. Now with upper GI bleeding w/ coffee ground emesis. In SICU for hemodynamic monitoring.     Neuro: Hydromorphone PRN. Sleep: melotonin PRN, zofran PRN for nausea  CV: Tachycardic 2/2 fever. Repeat echo with EF (02/11) 55-60%. Mildly dilated RV. Maintain MAP >65. A.Fib/Flutter: s/p cardioversion on 12/30. Cont metoprolol/amiodarone; hx HTN: holding spironolactone, Entresto  Pulm: Sating well on RA  GI/FEN: Upper GI bleed: on PPI gtt, SBO (resolving?): NGT to LIWS, NPO. s/p ostomy reversal (3/7), PICC w/ TPN and lipids, cont mirtazapine  : Uriarte, Strict I and O.   Endo: mISS  Heme:  Anemia Hb 9.2 s/p 2uPRBC (02/13), pending 2nd unit. LV thrombus w/ LLE ischemia. Heparin gtt held. Active T&S;   ID: RLQ abscess, LLE ischemia s/p L AKA 2/15; VRE faecium and ESBL Klebsiella BSI (enteric napoleon), Cont: Dapto 2/11-3/9; 3/10--), Tobra (3/13--), Luis (3/13--)  // DC: Zosyn. Vanc (2/10--2/11), Luis (2/10-2/12), Fluconasole (3/9-13), Erta (2/13-3/10; 3/13), Consulting ID about stopping luis (drug fever?)  PPX: SCDs, hold HSQ  L/D/T: PIV  PT/OT: Not ordered  Dispo: SICU   77M PMHx of IVDU found unresponsive at his nursing home, resolved after Narcan in the field and brought to [facility]. Found to have PE, atrial flutter, and large LV thrombus on echo. Transferred to [facility] for further management. Pt c/o abdominal pain on 12/10 CTA showing mid SMA with embolus. Abnormal distal small bowel loops and cecum with dilatation and pneumatosis suggesting infarcted bowel. Now s/p Ex lap, SMA embolectomy, 80cm SBR, abthera vac left in discontinuity (12/11). S/p second look (12/13), s/p OR for 3rd look, end-to-end anastomosis of remaining bowel, loop ileostomy and abdomen closure (12/15). S/p LORENZO and Cardioversion on 12/30 with cardiology. Patient was nutritionally optimized. 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 s/p L AKA closure (3/1), s/p ileostomy reversal (3/7) c/b ileus. Now with upper GI bleeding w/ coffee ground emesis. In SICU for hemodynamic monitoring.     Neuro: Hydromorphone PRN. Sleep: melotonin PRN, zofran PRN for nausea  CV: Tachycardic 2/2 fever. Repeat echo with EF (02/11) 55-60%. Mildly dilated RV. Maintain MAP >65. A.Fib/Flutter: s/p cardioversion on 12/30. Cont metoprolol/amiodarone; hx HTN: holding spironolactone, Entresto  Pulm: Sating well on RA  GI/FEN: Upper GI bleed: on PPI gtt, SBO with continued high NGT output: NGT to LIWS, NPO. s/p ostomy reversal (3/7), PICC w/ TPN and lipids, cont mirtazapine  : Uriarte, Strict I and O.   Endo: mISS  Heme:  Anemia Hb 9.2 s/p 2uPRBC (02/13), pending 2nd unit. LV thrombus w/ LLE ischemia. Heparin gtt held. Active T&S;   ID: RLQ abscess, LLE ischemia s/p L AKA 2/15; VRE faecium and ESBL Klebsiella BSI (enteric napoleon), Cont: Dapto 2/11-3/9; 3/10--), Tobra (3/13--), Luis (3/13--)  // DC: Zosyn. Vanc (2/10--2/11), Luis (2/10-2/12), Fluconasole (3/9-13), Erta (2/13-3/10; 3/13), Consulting ID about stopping luis, question of possible drug fever in the setting of worsening leukocytosis/fevers.   PPX: SCDs, hold HSQ  L/D/T: PIV  PT/OT: Not ordered  Dispo: SICU   77M PMHx of IVDU found unresponsive at his nursing home, resolved after Narcan in the field and brought to [facility]. Found to have PE, atrial flutter, and large LV thrombus on echo. Transferred to [facility] for further management. Pt c/o abdominal pain on 12/10 CTA showing mid SMA with embolus. Abnormal distal small bowel loops and cecum with dilatation and pneumatosis suggesting infarcted bowel. Now s/p Ex lap, SMA embolectomy, 80cm SBR, abthera vac left in discontinuity (12/11). S/p second look (12/13), s/p OR for 3rd look, end-to-end anastomosis of remaining bowel, loop ileostomy and abdomen closure (12/15). S/p LOREZNO and Cardioversion on 12/30 with cardiology. Patient was nutritionally optimized. 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 s/p L AKA closure (3/1), s/p ileostomy reversal (3/7) c/b ileus. Now with upper GI bleeding w/ coffee ground emesis. In SICU for hemodynamic monitoring.     Neuro: Hydromorphone PRN. Sleep: melotonin PRN, zofran PRN for nausea  CV: Tachycardic 2/2 fever. Repeat echo with EF (02/11) 55-60%. Mildly dilated RV. Maintain MAP >65. A.Fib/Flutter: s/p cardioversion on 12/30. Cont metoprolol/amiodarone; hx HTN: holding spironolactone, Entresto  Pulm: Sating well on RA  GI/FEN: Upper GI bleed: on PPI gtt, SBO with continued high NGT output: NGT to LIWS, NPO. s/p ostomy reversal (3/7), PICC w/ TPN and lipids, cont mirtazapine  : Uriarte, Strict I and O.   Endo: mISS  Heme:  Anemia Hb 9.2 s/p 2uPRBC (02/13), pending 2nd unit. LV thrombus w/ LLE ischemia. Heparin gtt held. Active T&S;   ID: RLQ abscess, LLE ischemia s/p L AKA 2/15; VRE faecium and ESBL Klebsiella BSI (enteric napoleon), Cont: Dapto 2/11-3/9; 3/10--), Tobra (3/13--), Luis (3/13--)  // DC: Zosyn. Vanc (2/10--2/11), Luis (2/10-2/12), Fluconasole (3/9-13), Erta (2/13-3/10; 3/13),   PPX: SCDs, hold HSQ  L/D/T: PIV  PT/OT: Not ordered  Dispo: SICU

## 2023-03-15 NOTE — CHART NOTE - NSCHARTNOTEFT_GEN_A_CORE
Admitting Diagnosis:   Patient is a 78y old  Male who presents with a chief complaint of A flutter (15 Mar 2023 10:46)      PAST MEDICAL & SURGICAL HISTORY:      Current Nutrition Order:  NPO diet with NGT to LIWS  TPN: 275g Dex, 90g AA, 50g 20% SMOF Lipids to provide in total 1795Kcal, 90g protein, GIR 2.98 (based on ABW:65kg), 1.4g/kg ABW protein    PO Intake: Good (%) [   ]  Fair (50-75%) [   ] Poor (<25%) [   ]-- NA NPO/TPN     GI Issues:   RN denies BM thus far on Shift  Ileostomy now reversed  NGT to LIWS: 1850ml 3/15, 2500ml+ 3/14   Ordered for pantoprazole Infusion    Pain:   None per flow sheets     Skin Integrity:  Surgical incision noted, Buddy: 15  New left AKA as of 2/15  2+L Hand edema noted  pressure ulcer: sacral spine stage III vs unstageable    Labs:   03-15    139  |  105  |  16  ----------------------------<  119<H>  4.4   |  25  |  0.48<L>    Ca    8.4      15 Mar 2023 05:30  Phos  3.8     03-15  Mg     2.4     03-15    TPro  6.1  /  Alb  2.1<L>  /  TBili  0.5  /  DBili  0.2  /  AST  15  /  ALT  19  /  AlkPhos  168<H>  03-15    CAPILLARY BLOOD GLUCOSE      POCT Blood Glucose.: 100 mg/dL (15 Mar 2023 11:19)  POCT Blood Glucose.: 100 mg/dL (15 Mar 2023 06:37)  POCT Blood Glucose.: 103 mg/dL (14 Mar 2023 17:42)      Medications:  MEDICATIONS  (STANDING):  aMIOdarone    Tablet 100 milliGRAM(s) Oral every 24 hours  ascorbic acid 500 milliGRAM(s) Oral daily  caspofungin IVPB 50 milliGRAM(s) IV Intermittent every 24 hours  chlorhexidine 2% Cloths 1 Application(s) Topical daily  cholestyramine Powder (Sugar-Free) 2 Gram(s) Oral three times a day  DAPTOmycin IVPB 500 milliGRAM(s) IV Intermittent every 24 hours  dextrose 5%. 1000 milliLiter(s) (50 mL/Hr) IV Continuous <Continuous>  dextrose 5%. 1000 milliLiter(s) (100 mL/Hr) IV Continuous <Continuous>  dextrose 50% Injectable 25 Gram(s) IV Push once  dextrose 50% Injectable 12.5 Gram(s) IV Push once  dextrose 50% Injectable 25 Gram(s) IV Push once  fat emulsion (Fish Oil and Plant Based) 20% Infusion 0.7764 Gm/kG/Day (20.83 mL/Hr) IV Continuous <Continuous>  glucagon  Injectable 1 milliGRAM(s) IntraMuscular once  influenza  Vaccine (HIGH DOSE) 0.7 milliLiter(s) IntraMuscular once  insulin lispro (ADMELOG) corrective regimen sliding scale   SubCutaneous every 6 hours  melatonin Liquid 5 milliGRAM(s) Oral at bedtime  meropenem  IVPB 1000 milliGRAM(s) IV Intermittent every 8 hours  metoprolol tartrate Injectable 5 milliGRAM(s) IV Push every 6 hours  mirtazapine 30 milliGRAM(s) Oral at bedtime  multivitamin 1 Tablet(s) Oral daily  nystatin Powder 1 Application(s) Topical two times a day  pantoprazole Infusion 8 mG/Hr (10 mL/Hr) IV Continuous <Continuous>  Parenteral Nutrition - Adult 1 Each (83 mL/Hr) TPN Continuous <Continuous>  Parenteral Nutrition - Adult 1 Each (83 mL/Hr) TPN Continuous <Continuous>  povidone iodine 10% Solution 1 Application(s) Topical daily  sodium chloride 0.9% lock flush 10 milliLiter(s) IV Push every 8 hours  tobramycin IVPB 320 milliGRAM(s) IV Intermittent every 24 hours  zinc sulfate 220 milliGRAM(s) Oral daily    MEDICATIONS  (PRN):  dextrose Oral Gel 15 Gram(s) Oral once PRN Blood Glucose LESS THAN 70 milliGRAM(s)/deciliter  lidocaine   4% Patch 1 Patch Transdermal once PRN abd pain  melatonin 3 milliGRAM(s) Oral at bedtime PRN Insomnia  ondansetron Injectable 4 milliGRAM(s) IV Push every 6 hours PRN Nausea and/or Vomiting  sodium chloride 0.9% lock flush 10 milliLiter(s) IV Push every 1 hour PRN Pre/post blood products, medications, blood draw, and to maintain line patency          Admitting Anthropometrics:  6'6''  pounds +-10%  Wt 142 pounds BMI 16.4 %IBW=66%   New adjusted IBW (w/ L AKA): 178lbs/ 81.3kg     Weight Change:   2/15 141.7 pounds   141.9 pounds - dosing wt    136 pounds - Bedscale wt   2/15 142lbs    **PCM:  >> Reports having 1-2 meals/day + ONS. Per pt claims to have 2 ensure/day and 2 nutrament/day - unclear how accurate this is. ?If pt meeting ~75% EER consistently.  >> Does report wt loss.  pounds x6 months ago. Thinks he now is 135 pounds which is consistent with bedscale wt obtained during initial visit by  pounds. Suggest wt loss of 49 pounds/26% body wt loss.  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     Estimated energy needs:  ABW used for calculations as pt between % of IBW (80%)  Adjust for age, malnutrition, EF, S/p OR, Pressure ulcer; fluids per team  30-35kcal/k-2275kcal/day   1.4-1.6gm/k-104gm prot/day   **Rec upper end of needs     Subjective:   77M with PMHx of IVDU found unresponsive at his nursing home, resolved after Narcan in the field and brought to [facility]. Found to have PE, atrial flutter, and large LV thrombus on echo. Transferred to [facility] for further management. Pt c/o abdominal pain on 12/10 CTA showing mid SMA with embolus. Abnormal distal small bowel loops and cecum with dilatation and pneumatosis suggesting infarcted bowel. One or two tiny foci of intrahepatic portal vein pneumatosis. Segmental infarction upper pole left kidney. Now s/p Ex lap, SMA embolectomy, 80cm SBR, abthera vac left in discontinuity () and transferred to SICU intubated. S/p second look () and most recently s/p OR for 3rd look, end-to-end anastomosis of remaining bowel, loop ileostomy and abdomen closure (12/15). Transferred to CCU on  for cardiac optimization and now transferred back to SICU  for bleeding hemodynamic instability. Echo  shows EF 10-15% with overall hypokinesis. CTA performed demonstrating large hematoma in R abdomen, new hemoperitoneum, and bilateral pleural effusions. Remains in SICU, now extubated with still with limb ischemia to LLE pending amputation and AC held given BRBPR and hematoma; noted pt agreeable for AKA when feasible - AKA currently Pending Cards. Pt s/p DCCV on  (negative LORENZO on ) with successful conversion to NSR. Planning for AKA with vascular surgery once nutrition status is optimize. Team placed PICC 1/10 and initiated supplemental TPN now weaned off with constant encouragement of PO intake. CT A/P  showing RLQ fluid collection. PPN held / bacteremia. More recently found to have k. 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 PICC placed  now plan to restart TPN. S/p RTOR for L AKA closure 3/1. S/p ileostomy reversal (3/7). Pt with dark output from NGT 3/13 and transfered back to SICU. Now pending possible Endo eval for ?UGIB,  IR reconsulted for right lower abdominal wall collection per CT Scan and possible drainage.     On assessment, pt resting in bed. Spoke with RN. Pt currently NPO with NGT to LIWS; High dark OP noted this AM, RN reports 200ml in last 2hrs. Remains ordered for TPN. Ostomy now reversed; no BM thus far on shift.  Labs: POCT 100 103 105 ,  3/15, Na K Mg Phos WDL, BUN/Cr 16/.48.   Please see RD Recs below, Will Cont to follow.     Prior PES: Malnutrition Severe in Chronic state RT presumed intake<EER AEB NFPE, wt loss, PO intake  >>on going  Goal: Pt will meet at least 75% of nutrient needs via feasible route / Show no further s/s of malnutrition    Recommendations:  1. While TPN Remains indicated as medically feasible/appropriate:  >> TPN: 275g Dex, 90g AA, 50g SMOF lipids to provide 1795 kcal, 90g pro, GIR 2.9 (Based on ABW:65kg).   >> Cont to Check TG weekly. Check Mg, Phos, K daily and POC BG Q6hrs. Trend daily weights. Fluids and lytes per MD discretion.   2. Monitor for ability to adv Diet Pending GI progression.   3. Monitor Skin, Wt, Labs, GOC.  4. Pain/GI per team.  5. RD to remain available for additional nutrition interventions as needed.     Education:  NA @ this time.     Risk Level: High [ X ] Moderate [   ] Low [   ].

## 2023-03-15 NOTE — PROGRESS NOTE ADULT - ASSESSMENT
77M PMHx of IVDU found unresponsive at his nursing home, resolved after Narcan in the field and brought to [facility]. Found to have PE, atrial flutter, and large LV thrombus on echo. Transferred to [facility] for further management. Pt c/o abdominal pain on 12/10 CTA showing mid SMA with embolus. Abnormal distal small bowel loops and cecum with dilatation and pneumatosis suggesting infarcted bowel. Now s/p Ex lap, SMA embolectomy, 80cm SBR, abthera vac left in discontinuity (12/11). S/p second look (12/13), s/p OR for 3rd look, end-to-end anastomosis of remaining bowel, loop ileostomy and abdomen closure (12/15). S/p LORENZO and Cardioversion on 12/30 with cardiology. Patient was nutritionally optimized. 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 s/p L AKA closure (3/1), s/p ileostomy reversal (3/7). Patient with noted coffee gorund/dark red drainage from NGT, Hgb found to be 4 and 5, upgraded to SICU for HD monitoring.     Plan:    -NPO  -TPN  -NGT to LIWS  -PPI  -F/u GI recs  -F/u vasc recs  -F/u ID recs  -Monitor fever curve  -Cont abx  -Rest of care per SICU  -Surgery Team 4C will continue to follow. Please page Team 4 with questions/clinical changes. [phone]

## 2023-03-15 NOTE — PROGRESS NOTE ADULT - ATTENDING COMMENTS
Patient seen and d/w Dr. Zarate.     NG tube with no further dark output. Continue PPI and monitor output. No acute indication for endoscopy. Agree with above.

## 2023-03-16 NOTE — PROGRESS NOTE ADULT - SUBJECTIVE AND OBJECTIVE BOX
INFECTIOUS DISEASES CONSULT FOLLOW-UP NOTE    INTERVAL HPI/OVERNIGHT EVENTS: Uriarte removed, passed ToV. Febrile 102.3F, received Ofirmev & 500cc NGT bolus    ROS:   Constitutional, eyes, ENT, cardiovascular, respiratory, gastrointestinal, genitourinary, integumentary, neurological, psychiatric and heme/lymph are otherwise negative other than noted above     ANTIBIOTICS/RELEVANT:    MEDICATIONS  (STANDING):  aMIOdarone    Tablet 100 milliGRAM(s) Oral every 24 hours  ascorbic acid 500 milliGRAM(s) Oral daily  caspofungin IVPB 50 milliGRAM(s) IV Intermittent every 24 hours  chlorhexidine 2% Cloths 1 Application(s) Topical daily  dextrose 5%. 1000 milliLiter(s) (100 mL/Hr) IV Continuous <Continuous>  dextrose 5%. 1000 milliLiter(s) (50 mL/Hr) IV Continuous <Continuous>  dextrose 50% Injectable 25 Gram(s) IV Push once  dextrose 50% Injectable 12.5 Gram(s) IV Push once  dextrose 50% Injectable 25 Gram(s) IV Push once  fat emulsion (Fish Oil and Plant Based) 20% Infusion 0.7764 Gm/kG/Day (20.83 mL/Hr) IV Continuous <Continuous>  glucagon  Injectable 1 milliGRAM(s) IntraMuscular once  heparin   Injectable 5000 Unit(s) SubCutaneous every 8 hours  influenza  Vaccine (HIGH DOSE) 0.7 milliLiter(s) IntraMuscular once  insulin lispro (ADMELOG) corrective regimen sliding scale   SubCutaneous every 6 hours  melatonin Liquid 5 milliGRAM(s) Oral at bedtime  meropenem  IVPB 1000 milliGRAM(s) IV Intermittent every 8 hours  metoprolol tartrate Injectable 5 milliGRAM(s) IV Push every 6 hours  mirtazapine 30 milliGRAM(s) Oral at bedtime  multivitamin 1 Tablet(s) Oral daily  nystatin Powder 1 Application(s) Topical two times a day  pantoprazole  Injectable 40 milliGRAM(s) IV Push every 12 hours  Parenteral Nutrition - Adult 1 Each (83 mL/Hr) TPN Continuous <Continuous>  Parenteral Nutrition - Adult 1 Each (83 mL/Hr) TPN Continuous <Continuous>  povidone iodine 10% Solution 1 Application(s) Topical daily  sodium chloride 0.9% lock flush 10 milliLiter(s) IV Push every 8 hours  tobramycin IVPB 320 milliGRAM(s) IV Intermittent every 24 hours  zinc sulfate 220 milliGRAM(s) Oral daily    MEDICATIONS  (PRN):  benzocaine/menthol Lozenge 1 Lozenge Oral every 4 hours PRN Sore Throat  dextrose Oral Gel 15 Gram(s) Oral once PRN Blood Glucose LESS THAN 70 milliGRAM(s)/deciliter  lidocaine   4% Patch 1 Patch Transdermal once PRN abd pain  melatonin 3 milliGRAM(s) Oral at bedtime PRN Insomnia  ondansetron Injectable 4 milliGRAM(s) IV Push every 6 hours PRN Nausea and/or Vomiting  sodium chloride 0.9% lock flush 10 milliLiter(s) IV Push every 1 hour PRN Pre/post blood products, medications, blood draw, and to maintain line patency    Vital Signs Last 24 Hrs  T(C): 37.1 (16 Mar 2023 13:00), Max: 39.1 (15 Mar 2023 21:55)  T(F): 98.8 (16 Mar 2023 13:00), Max: 102.3 (15 Mar 2023 21:55)  HR: 102 (16 Mar 2023 13:00) (102 - 124)  BP: 119/67 (16 Mar 2023 13:00) (109/60 - 157/90)  BP(mean): 85 (16 Mar 2023 13:00) (79 - 112)  RR: 23 (16 Mar 2023 13:00) (16 - 28)  SpO2: 100% (16 Mar 2023 13:00) (92% - 100%)    Parameters below as of 16 Mar 2023 13:00  Patient On (Oxygen Delivery Method): room air    03-15-23 @ 07:01  -  03-16-23 @ 07:00  --------------------------------------------------------  IN: 4952.4 mL / OUT: 4590 mL / NET: 362.4 mL    03-16-23 @ 07:01  -  03-16-23 @ 13:15  --------------------------------------------------------  IN: 678 mL / OUT: 75 mL / NET: 603 mL    PHYSICAL EXAM:  Constitutional: alert, NAD  Eyes: the sclera and conjunctiva were normal.   ENT: the ears and nose were normal in appearance.   Neck: the appearance of the neck was normal and the neck was supple.   Pulmonary: no respiratory distress and coarse b/l inspiratory crakles    Heart: heart rate was normal and rhythm regular, normal S1 and S2  Vascular:. there was no peripheral edema  Abdomen: normal bowel sounds, firm, mild distension, mild TTP diffusely, prior ostomy site swallow base ulcer, dressing c/d/i, minimal drainage.  Neurological: no focal deficits.   Psychiatric: the affect was normal    LABS:                        8.2    33.46 )-----------( 471      ( 16 Mar 2023 06:49 )             25.2     03-16    137  |  104  |  14  ----------------------------<  120<H>  3.7   |  24  |  0.47<L>    Ca    8.4      16 Mar 2023 08:23  Phos  4.1     03-16  Mg     2.0     03-16    TPro  6.1  /  Alb  2.1<L>  /  TBili  0.5  /  DBili  0.2  /  AST  15  /  ALT  19  /  AlkPhos  168<H>  03-15    MICROBIOLOGY:    RADIOLOGY & ADDITIONAL STUDIES:  Reviewed INFECTIOUS DISEASES CONSULT FOLLOW-UP NOTE    INTERVAL HPI/OVERNIGHT EVENTS: Uriarte removed, passed ToV. Febrile 102.3F, received Ofirmev & 500cc NGT bolus. No complaints, negative ROS.    ROS:   Constitutional, eyes, ENT, cardiovascular, respiratory, gastrointestinal, genitourinary, integumentary, neurological, psychiatric and heme/lymph are otherwise negative other than noted above     ANTIBIOTICS/RELEVANT:    MEDICATIONS  (STANDING):  aMIOdarone    Tablet 100 milliGRAM(s) Oral every 24 hours  ascorbic acid 500 milliGRAM(s) Oral daily  caspofungin IVPB 50 milliGRAM(s) IV Intermittent every 24 hours  chlorhexidine 2% Cloths 1 Application(s) Topical daily  dextrose 5%. 1000 milliLiter(s) (100 mL/Hr) IV Continuous <Continuous>  dextrose 5%. 1000 milliLiter(s) (50 mL/Hr) IV Continuous <Continuous>  dextrose 50% Injectable 25 Gram(s) IV Push once  dextrose 50% Injectable 12.5 Gram(s) IV Push once  dextrose 50% Injectable 25 Gram(s) IV Push once  fat emulsion (Fish Oil and Plant Based) 20% Infusion 0.7764 Gm/kG/Day (20.83 mL/Hr) IV Continuous <Continuous>  glucagon  Injectable 1 milliGRAM(s) IntraMuscular once  heparin   Injectable 5000 Unit(s) SubCutaneous every 8 hours  influenza  Vaccine (HIGH DOSE) 0.7 milliLiter(s) IntraMuscular once  insulin lispro (ADMELOG) corrective regimen sliding scale   SubCutaneous every 6 hours  melatonin Liquid 5 milliGRAM(s) Oral at bedtime  meropenem  IVPB 1000 milliGRAM(s) IV Intermittent every 8 hours  metoprolol tartrate Injectable 5 milliGRAM(s) IV Push every 6 hours  mirtazapine 30 milliGRAM(s) Oral at bedtime  multivitamin 1 Tablet(s) Oral daily  nystatin Powder 1 Application(s) Topical two times a day  pantoprazole  Injectable 40 milliGRAM(s) IV Push every 12 hours  Parenteral Nutrition - Adult 1 Each (83 mL/Hr) TPN Continuous <Continuous>  Parenteral Nutrition - Adult 1 Each (83 mL/Hr) TPN Continuous <Continuous>  povidone iodine 10% Solution 1 Application(s) Topical daily  sodium chloride 0.9% lock flush 10 milliLiter(s) IV Push every 8 hours  tobramycin IVPB 320 milliGRAM(s) IV Intermittent every 24 hours  zinc sulfate 220 milliGRAM(s) Oral daily    MEDICATIONS  (PRN):  benzocaine/menthol Lozenge 1 Lozenge Oral every 4 hours PRN Sore Throat  dextrose Oral Gel 15 Gram(s) Oral once PRN Blood Glucose LESS THAN 70 milliGRAM(s)/deciliter  lidocaine   4% Patch 1 Patch Transdermal once PRN abd pain  melatonin 3 milliGRAM(s) Oral at bedtime PRN Insomnia  ondansetron Injectable 4 milliGRAM(s) IV Push every 6 hours PRN Nausea and/or Vomiting  sodium chloride 0.9% lock flush 10 milliLiter(s) IV Push every 1 hour PRN Pre/post blood products, medications, blood draw, and to maintain line patency    Vital Signs Last 24 Hrs  T(C): 37.1 (16 Mar 2023 13:00), Max: 39.1 (15 Mar 2023 21:55)  T(F): 98.8 (16 Mar 2023 13:00), Max: 102.3 (15 Mar 2023 21:55)  HR: 102 (16 Mar 2023 13:00) (102 - 124)  BP: 119/67 (16 Mar 2023 13:00) (109/60 - 157/90)  BP(mean): 85 (16 Mar 2023 13:00) (79 - 112)  RR: 23 (16 Mar 2023 13:00) (16 - 28)  SpO2: 100% (16 Mar 2023 13:00) (92% - 100%)    Parameters below as of 16 Mar 2023 13:00  Patient On (Oxygen Delivery Method): room air    03-15-23 @ 07:01  -  03-16-23 @ 07:00  --------------------------------------------------------  IN: 4952.4 mL / OUT: 4590 mL / NET: 362.4 mL    03-16-23 @ 07:01  -  03-16-23 @ 13:15  --------------------------------------------------------  IN: 678 mL / OUT: 75 mL / NET: 603 mL    PHYSICAL EXAM:  Constitutional: alert, NAD  Eyes: the sclera and conjunctiva were normal.   ENT: the ears and nose were normal in appearance.   Neck: the appearance of the neck was normal and the neck was supple.   Pulmonary: no respiratory distress and coarse b/l inspiratory crakles    Heart: heart rate was normal and rhythm regular, normal S1 and S2  Vascular:. there was no peripheral edema  Abdomen: normal bowel sounds, firm, mild distension, mild TTP diffusely, prior ostomy site swallow base ulcer, dressing c/d/i, minimal drainage.  Neurological: no focal deficits.   Psychiatric: the affect was normal    LABS:                        8.2    33.46 )-----------( 471      ( 16 Mar 2023 06:49 )             25.2     03-16    137  |  104  |  14  ----------------------------<  120<H>  3.7   |  24  |  0.47<L>    Ca    8.4      16 Mar 2023 08:23  Phos  4.1     03-16  Mg     2.0     03-16    TPro  6.1  /  Alb  2.1<L>  /  TBili  0.5  /  DBili  0.2  /  AST  15  /  ALT  19  /  AlkPhos  168<H>  03-15    MICROBIOLOGY:    RADIOLOGY & ADDITIONAL STUDIES:  Reviewed

## 2023-03-16 NOTE — PROGRESS NOTE ADULT - ATTENDING COMMENTS
Patient seen and d/w Dr. Zarate. Agree with above.    Patient with marked leukocytosis. NGT with minimal output, no further CGE. No acute indication for endoscopy at this time.

## 2023-03-16 NOTE — PROGRESS NOTE ADULT - SUBJECTIVE AND OBJECTIVE BOX
GASTROENTEROLOGY PROGRESS NOTE  Patient seen and examined at bedside. no bleeding    PERTINENT REVIEW OF SYSTEMS:  HEENT: No visual changes; No vertigo or throat pain   GASTROINTESTINAL: As above.  NEUROLOGICAL: No numbness or weakness  SKIN: No itching, burning, rashes, or lesions     Allergies    No Known Allergies    Intolerances      MEDICATIONS:  MEDICATIONS  (STANDING):  aMIOdarone    Tablet 100 milliGRAM(s) Oral every 24 hours  ascorbic acid 500 milliGRAM(s) Oral daily  caspofungin IVPB 50 milliGRAM(s) IV Intermittent every 24 hours  chlorhexidine 2% Cloths 1 Application(s) Topical daily  dextrose 5%. 1000 milliLiter(s) (50 mL/Hr) IV Continuous <Continuous>  dextrose 5%. 1000 milliLiter(s) (100 mL/Hr) IV Continuous <Continuous>  dextrose 50% Injectable 25 Gram(s) IV Push once  dextrose 50% Injectable 12.5 Gram(s) IV Push once  dextrose 50% Injectable 25 Gram(s) IV Push once  fat emulsion (Fish Oil and Plant Based) 20% Infusion 0.7764 Gm/kG/Day (20.83 mL/Hr) IV Continuous <Continuous>  glucagon  Injectable 1 milliGRAM(s) IntraMuscular once  heparin   Injectable 5000 Unit(s) SubCutaneous every 8 hours  influenza  Vaccine (HIGH DOSE) 0.7 milliLiter(s) IntraMuscular once  insulin lispro (ADMELOG) corrective regimen sliding scale   SubCutaneous every 6 hours  melatonin Liquid 5 milliGRAM(s) Oral at bedtime  metoprolol tartrate Injectable 5 milliGRAM(s) IV Push every 6 hours  mirtazapine 30 milliGRAM(s) Oral at bedtime  multivitamin 1 Tablet(s) Oral daily  nystatin Powder 1 Application(s) Topical two times a day  pantoprazole  Injectable 40 milliGRAM(s) IV Push every 12 hours  Parenteral Nutrition - Adult 1 Each (83 mL/Hr) TPN Continuous <Continuous>  Parenteral Nutrition - Adult 1 Each (83 mL/Hr) TPN Continuous <Continuous>  povidone iodine 10% Solution 1 Application(s) Topical daily  sodium chloride 0.9% lock flush 10 milliLiter(s) IV Push every 8 hours  tigecycline IVPB      zinc sulfate 220 milliGRAM(s) Oral daily    MEDICATIONS  (PRN):  benzocaine/menthol Lozenge 1 Lozenge Oral every 4 hours PRN Sore Throat  dextrose Oral Gel 15 Gram(s) Oral once PRN Blood Glucose LESS THAN 70 milliGRAM(s)/deciliter  lidocaine   4% Patch 1 Patch Transdermal once PRN abd pain  melatonin 3 milliGRAM(s) Oral at bedtime PRN Insomnia  ondansetron Injectable 4 milliGRAM(s) IV Push every 6 hours PRN Nausea and/or Vomiting  sodium chloride 0.9% lock flush 10 milliLiter(s) IV Push every 1 hour PRN Pre/post blood products, medications, blood draw, and to maintain line patency    Vital Signs Last 24 Hrs  T(C): 38.7 (16 Mar 2023 16:16), Max: 39.1 (15 Mar 2023 21:55)  T(F): 101.6 (16 Mar 2023 16:16), Max: 102.3 (15 Mar 2023 21:55)  HR: 116 (16 Mar 2023 17:00) (102 - 125)  BP: 130/70 (16 Mar 2023 17:00) (115/66 - 157/90)  BP(mean): 93 (16 Mar 2023 17:00) (83 - 112)  RR: 24 (16 Mar 2023 17:00) (17 - 28)  SpO2: 95% (16 Mar 2023 17:00) (92% - 100%)    Parameters below as of 16 Mar 2023 17:00  Patient On (Oxygen Delivery Method): room air        03-15 @ 07:01  -  03-16 @ 07:00  --------------------------------------------------------  IN: 4952.4 mL / OUT: 4590 mL / NET: 362.4 mL    03-16 @ 07:01  -  03-16 @ 17:09  --------------------------------------------------------  IN: 3026 mL / OUT: 325 mL / NET: 2701 mL      PHYSICAL EXAM:    General: lying in bed, in no acute distress, appears chronically ill, ng tube in place  HEENT: MMM, conjunctiva and sclera clear  Gastrointestinal: Soft non-tender non-distended; No rebound or guarding  Skin: Warm and dry. No obvious rash    LABS:                        8.2    33.46 )-----------( 471      ( 16 Mar 2023 06:49 )             25.2     03-16    137  |  104  |  14  ----------------------------<  120<H>  3.7   |  24  |  0.47<L>    Ca    8.4      16 Mar 2023 08:23  Phos  4.1     03-16  Mg     2.0     03-16    TPro  6.1  /  Alb  2.1<L>  /  TBili  0.5  /  DBili  0.2  /  AST  15  /  ALT  19  /  AlkPhos  168<H>  03-15                      Culture - Blood (collected 15 Mar 2023 14:54)  Source: .Blood Blood-Peripheral  Preliminary Report (16 Mar 2023 16:00):    No growth at 1 day.    Culture - Blood (collected 15 Mar 2023 14:54)  Source: .Blood Blood-Peripheral  Preliminary Report (16 Mar 2023 16:00):    No growth at 1 day.    Culture - Blood (collected 13 Mar 2023 17:40)  Source: .Blood Blood  Preliminary Report (15 Mar 2023 19:00):    No growth at 2 days.      RADIOLOGY & ADDITIONAL STUDIES:  Reviewed

## 2023-03-16 NOTE — PROGRESS NOTE ADULT - ASSESSMENT
77M PMHx of IVDU found unresponsive at his nursing home, resolved after Narcan in the field and brought to [facility]. Found to have PE, atrial flutter, and large LV thrombus on echo. Transferred to [facility] for further management. Pt c/o abdominal pain on 12/10 CTA showing mid SMA with embolus. Abnormal distal small bowel loops and cecum with dilatation and pneumatosis suggesting infarcted bowel. Now s/p Ex lap, SMA embolectomy, 80cm SBR, abthera vac left in discontinuity (12/11). S/p second look (12/13), s/p OR for 3rd look, end-to-end anastomosis of remaining bowel, loop ileostomy and abdomen closure (12/15). S/p LORENZO and Cardioversion on 12/30 with cardiology. Patient was nutritionally optimized. 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 s/p L AKA closure (3/1), s/p ileostomy reversal (3/7) c/b ileus. Now with upper GI bleeding w/ coffee ground emesis. In SICU for hemodynamic monitoring.     Neuro: Hydromorphone PRN. Sleep: melatonin PRN, zofran PRN for nausea  ENT:  Cepacol for sore throat  CV: Tachycardic 2/2 fever, hypovolemic (insensible losses). Repeat echo with EF (02/11) 55-60%. Mildly dilated RV. Maintain MAP >65. A.Fib/Flutter: s/p cardioversion on 12/30. Cont metoprolol/amiodarone; hx HTN: holding spironolactone, Entresto  Pulm: Sating well on RA  GI/FEN: Upper GI bleed resolved: on PPI BID , SBO (resolving?): NGT to LIWS, NPO. s/p ostomy reversal (3/7), PICC w/ TPN and lipids, cont mirtazapine  : Voiding, Strict I and O.   Endo: mISS  Heme:  HB stable s/p 2uPRBC (02/13), pending 2nd unit. LV thrombis w/ LLE ischemia. Heparin gtt held. Active T&S;   ID: LLE ischemia s/p L AKA 2/15; VRE faecium and ESBL Klebsiella BSI (enteric napoleon), Cont: Dapto 2/11-3/9; 3/10--), Tobra (3/13--), Luis (3/13--)  // DC: Zosyn. Vanc (2/10--2/11), Luis (2/10-2/12), Fluconasole (3/9-13), Erta (2/13-3/10; 3/13),   PPX: SCDs, restarting HSQ  L/D/T: PIV  PT/OT: Not ordered  Dispo: SICU

## 2023-03-16 NOTE — PROGRESS NOTE ADULT - ASSESSMENT
77M PMHx of IVDU found unresponsive at his nursing home, resolved after Narcan in the field and brought to [facility]. Found to have PE, atrial flutter, and large LV thrombus on echo. Transferred to [facility] for further management. Pt c/o abdominal pain on 12/10 CTA showing mid SMA with embolus. Abnormal distal small bowel loops and cecum with dilatation and pneumatosis suggesting infarcted bowel. Now s/p Ex lap, SMA embolectomy, 80cm SBR, abthera vac left in discontinuity (12/11). S/p second look (12/13), s/p OR for 3rd look, end-to-end anastomosis of remaining bowel, loop ileostomy and abdomen closure (12/15). S/p LORENZO and Cardioversion on 12/30 with cardiology. Patient was nutritionally optimized. 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 s/p L AKA closure (3/1), s/p ileostomy reversal (3/7). Patient with noted coffee gorund/dark red drainage from NGT, Hgb found to be 4 and 5, upgraded to SICU for HD monitoring. Passing flatus, NGT output now clear from coffee ground 03/16.    Plan:    -NPO  -TPN  -NGT to LIWS  -PPI  -F/u GI recs  -F/u vasc recs  -F/u ID recs  -Monitor fever curve  -Cont abx  -Rest of care per SICU  -Surgery Team 4C will continue to follow. Please page Team 4 with questions/clinical changes. [phone]

## 2023-03-16 NOTE — PROGRESS NOTE ADULT - SUBJECTIVE AND OBJECTIVE BOX
ON: bowen d/farrah, pTOV, Temp 102, ofirmev, 4AM temp 101.6, ofirmev, 500 bolus for NGT  3/15: reordered TPN, diff neutroph 97% . Large amount of stomache contents on POCUS. NGT flushed: 400cc out. 500 LR bolus given and 1L NS (dry), drug fever? - discuss w/ ID, gave dilaudid 0.25, ggt 71 (elevated), PPI infusion until tomorrow per GI then can switch to BID, NGT out 900 - gave 500cc bolus (2L day), got blood cx     SUBJECTIVE: Patient seen and examined bedside; feels tired this am, c/o dry mouth, green swabs given and feeling better after, no abdominal pain, no nausea, c/o sore throat, passing flatus.    aMIOdarone    Tablet 100 milliGRAM(s) Oral every 24 hours  caspofungin IVPB 50 milliGRAM(s) IV Intermittent every 24 hours  meropenem  IVPB 1000 milliGRAM(s) IV Intermittent every 8 hours  metoprolol tartrate Injectable 5 milliGRAM(s) IV Push every 6 hours  tobramycin IVPB 320 milliGRAM(s) IV Intermittent every 24 hours      Vital Signs Last 24 Hrs  T(C): 38.5 (16 Mar 2023 04:32), Max: 39.1 (15 Mar 2023 21:55)  T(F): 101.3 (16 Mar 2023 04:32), Max: 102.3 (15 Mar 2023 21:55)  HR: 115 (16 Mar 2023 06:00) (102 - 124)  BP: 120/73 (16 Mar 2023 06:00) (109/60 - 157/90)  BP(mean): 92 (16 Mar 2023 06:00) (79 - 112)  RR: 20 (16 Mar 2023 06:00) (12 - 23)  SpO2: 96% (16 Mar 2023 06:00) (96% - 100%)    Parameters below as of 16 Mar 2023 06:00  Patient On (Oxygen Delivery Method): room air      I&O's Detail    14 Mar 2023 07:01  -  15 Mar 2023 07:00  --------------------------------------------------------  IN:    Fat Emulsion (Fish Oil &amp; Plant Based) 20% Infusion: 62.4 mL    Fat Emulsion (Fish Oil &amp; Plant Based) 20% Infusion: 187.2 mL    IV PiggyBack: 100 mL    IV PiggyBack: 200 mL    IV PiggyBack: 500 mL    IV PiggyBack: 100 mL    Pantoprazole: 230 mL    TPN (Total Parenteral Nutrition): 1909 mL  Total IN: 3288.6 mL    OUT:    Indwelling Catheter - Urethral (mL): 1425 mL    Nasogastric/Oral tube (mL): 1800 mL  Total OUT: 3225 mL    Total NET: 63.6 mL      15 Mar 2023 07:01  -  16 Mar 2023 06:34  --------------------------------------------------------  IN:    Fat Emulsion (Fish Oil &amp; Plant Based) 20% Infusion: 20.8 mL    Fat Emulsion (Fish Oil &amp; Plant Based) 20% Infusion: 249.6 mL    IV PiggyBack: 250 mL    IV PiggyBack: 100 mL    IV PiggyBack: 100 mL    Lactated Ringers Bolus: 500 mL    Pantoprazole: 230 mL    Sodium Chloride 0.9% Bolus: 1000 mL    Sodium Chloride 0.9% Bolus: 500 mL    TPN (Total Parenteral Nutrition): 1909 mL  Total IN: 4859.4 mL    OUT:    Incontinent per Condom Catheter (mL): 725 mL    Indwelling Catheter - Urethral (mL): 815 mL    Nasogastric/Oral tube (mL): 2600 mL  Total OUT: 4140 mL    Total NET: 719.4 mL      PE:    General: NAD, resting comfortably in bed  HEENT: NGT appropriately positioned draining clear fluid  C/V: NSR, s1 s2  Pulm: Nonlabored breathing, no respiratory distress  Abd: Soft, NTND, prior ostomy site unchanged from prior exam, dressing changed  Extrem: Left stump wrapped with ace        LABS:                        9.4    40.04 )-----------( 531      ( 15 Mar 2023 05:30 )             28.8     03-15    139  |  105  |  16  ----------------------------<  119<H>  4.4   |  25  |  0.48<L>    Ca    8.4      15 Mar 2023 05:30  Phos  3.8     03-15  Mg     2.4     03-15    TPro  6.1  /  Alb  2.1<L>  /  TBili  0.5  /  DBili  0.2  /  AST  15  /  ALT  19  /  AlkPhos  168<H>  03-15      Urinalysis Basic - ( 14 Mar 2023 09:20 )    Color: Yellow / Appearance: Clear / S.020 / pH: x  Gluc: x / Ketone: NEGATIVE  / Bili: Negative / Urobili: 1.0 E.U./dL   Blood: x / Protein: Trace mg/dL / Nitrite: NEGATIVE   Leuk Esterase: NEGATIVE / RBC: < 5 /HPF / WBC < 5 /HPF   Sq Epi: x / Non Sq Epi: 0-5 /HPF / Bacteria: None /HPF        RADIOLOGY & ADDITIONAL STUDIES:

## 2023-03-16 NOTE — PROGRESS NOTE ADULT - ASSESSMENT
77 yo male with prolonged course c/b mesenteric ischemia s/p SBR and development of RLQ abscess, LLE ischemia s/p L AKA 2/15; VRE faecium and ESBL Klebsiella BSI (enteric napoleon) likely 2/2 intra-abdominal abscess s/p drainage.  Patient afebrile since drainage.  Fluid cultures from OR with Klebsiella suggesting this is the source of the bacteremia. Now culture with Candida albicans and still febrile with leukocytosis. Abdominal fluid collection not drained. Now with GIB at risk for breakthrough bacteremia.   - Reengage palliative care  - Stop Daptomycin, Meropenem & Tobramycin   - Continue Caspofungin 50 mg IV daily (completed 1x 70 mg IV load)    ID team 1 will follow   79 yo male with prolonged course c/b mesenteric ischemia s/p SBR and development of RLQ abscess, LLE ischemia s/p L AKA 2/15; VRE faecium and ESBL Klebsiella BSI (enteric napoleon) likely 2/2 intra-abdominal abscess s/p drainage.  Patient afebrile since drainage.  Fluid cultures from OR with Klebsiella suggesting this is the source of the bacteremia. Now culture with Candida albicans and still febrile with leukocytosis. Abdominal fluid collection not drained. Now with GIB at risk for breakthrough bacteremia.   - Reengage palliative care  - Stop Daptomycin, Meropenem & Tobramycin   - Continue Caspofungin 50 mg IV daily (completed 1x 70 mg IV load)  - Check EKG    ID team 1 will follow   79 yo male with prolonged course c/b mesenteric ischemia s/p SBR and development of RLQ abscess, LLE ischemia s/p L AKA 2/15; VRE faecium and ESBL Klebsiella BSI (enteric napoleon) likely 2/2 intra-abdominal abscess s/p drainage.  Patient afebrile since drainage.  Fluid cultures from OR with Klebsiella suggesting this is the source of the bacteremia. Now culture with Candida albicans and still febrile with leukocytosis. Abdominal fluid collection not drained. Now with GIB at risk for breakthrough bacteremia.   - Reengage palliative care  - Stop Daptomycin, Meropenem & Tobramycin   - Switch to IV Tigecycline: give 100mg loading dose x1, then 12hrs later start 50mg q12h  - Continue Caspofungin 50mg IV daily (completed 1x 70 mg IV load)  - Check EKG    ID team 1 will follow

## 2023-03-16 NOTE — PROGRESS NOTE ADULT - SUBJECTIVE AND OBJECTIVE BOX
ON: jessi d/farrah, pTOV, Temp 102, ofirmev, 4AM temp 101.6, ofirmev, 500 bolus for NGT    SUBJECTIVE: Patient denies feeling cold, has multiple blanckets on, continues to have abdominal discomfort. Reports passing some gas overnight. No BM    MEDICATIONS  (STANDING):  aMIOdarone    Tablet 100 milliGRAM(s) Oral every 24 hours  ascorbic acid 500 milliGRAM(s) Oral daily  caspofungin IVPB 50 milliGRAM(s) IV Intermittent every 24 hours  chlorhexidine 2% Cloths 1 Application(s) Topical daily  dextrose 5%. 1000 milliLiter(s) (50 mL/Hr) IV Continuous <Continuous>  dextrose 5%. 1000 milliLiter(s) (100 mL/Hr) IV Continuous <Continuous>  dextrose 50% Injectable 25 Gram(s) IV Push once  dextrose 50% Injectable 12.5 Gram(s) IV Push once  dextrose 50% Injectable 25 Gram(s) IV Push once  fat emulsion (Fish Oil and Plant Based) 20% Infusion 0.7764 Gm/kG/Day (20.83 mL/Hr) IV Continuous <Continuous>  glucagon  Injectable 1 milliGRAM(s) IntraMuscular once  heparin   Injectable 5000 Unit(s) SubCutaneous every 8 hours  influenza  Vaccine (HIGH DOSE) 0.7 milliLiter(s) IntraMuscular once  insulin lispro (ADMELOG) corrective regimen sliding scale   SubCutaneous every 6 hours  melatonin Liquid 5 milliGRAM(s) Oral at bedtime  meropenem  IVPB 1000 milliGRAM(s) IV Intermittent every 8 hours  metoprolol tartrate Injectable 5 milliGRAM(s) IV Push every 6 hours  mirtazapine 30 milliGRAM(s) Oral at bedtime  multivitamin 1 Tablet(s) Oral daily  nystatin Powder 1 Application(s) Topical two times a day  pantoprazole  Injectable 40 milliGRAM(s) IV Push every 12 hours  Parenteral Nutrition - Adult 1 Each (83 mL/Hr) TPN Continuous <Continuous>  Parenteral Nutrition - Adult 1 Each (83 mL/Hr) TPN Continuous <Continuous>  povidone iodine 10% Solution 1 Application(s) Topical daily  sodium chloride 0.9% lock flush 10 milliLiter(s) IV Push every 8 hours  tobramycin IVPB 320 milliGRAM(s) IV Intermittent every 24 hours  zinc sulfate 220 milliGRAM(s) Oral daily    MEDICATIONS  (PRN):  benzocaine/menthol Lozenge 1 Lozenge Oral every 4 hours PRN Sore Throat  dextrose Oral Gel 15 Gram(s) Oral once PRN Blood Glucose LESS THAN 70 milliGRAM(s)/deciliter  lidocaine   4% Patch 1 Patch Transdermal once PRN abd pain  melatonin 3 milliGRAM(s) Oral at bedtime PRN Insomnia  ondansetron Injectable 4 milliGRAM(s) IV Push every 6 hours PRN Nausea and/or Vomiting  sodium chloride 0.9% lock flush 10 milliLiter(s) IV Push every 1 hour PRN Pre/post blood products, medications, blood draw, and to maintain line patency      Drips:     ICU Vital Signs Last 24 Hrs  T(C): 37.7 (16 Mar 2023 09:46), Max: 39.1 (15 Mar 2023 21:55)  T(F): 99.9 (16 Mar 2023 09:46), Max: 102.3 (15 Mar 2023 21:55)  HR: 119 (16 Mar 2023 12:00) (102 - 124)  BP: 144/80 (16 Mar 2023 12:00) (109/60 - 157/90)  BP(mean): 104 (16 Mar 2023 12:00) (79 - 112)  ABP: --  ABP(mean): --  RR: 17 (16 Mar 2023 12:00) (15 - 28)  SpO2: 96% (16 Mar 2023 12:00) (92% - 100%)    O2 Parameters below as of 16 Mar 2023 12:00  Patient On (Oxygen Delivery Method): room air            Physical Exam:  General: NAD  HEENT: NC/AT, dry mucus membranes  Pulmonary: Nonlabored breathing, no respiratory distress, CTA-B  Cardiovascular: tachycardic, no murmurs  Abdominal: soft, diffusely tender (improved), ND, midline incision well healing, ostomy reversal site with wet to dry dressing  Extremities: Left stump wrapped, WWP, no clubbing/cyanosis/edema  Neuro: A/O x3, CNs II-XII grossly intact,     Lines/tubes/drains:  Uriarte:	      Vent settings:      I&O's Summary    15 Mar 2023 07:01  -  16 Mar 2023 07:00  --------------------------------------------------------  IN: 4952.4 mL / OUT: 4590 mL / NET: 362.4 mL    16 Mar 2023 07:01  -  16 Mar 2023 12:32  --------------------------------------------------------  IN: 379 mL / OUT: 75 mL / NET: 304 mL        LABS:                        8.2    33.46 )-----------( 471      ( 16 Mar 2023 06:49 )             25.2     03-16    137  |  104  |  14  ----------------------------<  120<H>  3.7   |  24  |  0.47<L>    Ca    8.4      16 Mar 2023 08:23  Phos  4.1     03-16  Mg     2.0     03-16    TPro  6.1  /  Alb  2.1<L>  /  TBili  0.5  /  DBili  0.2  /  AST  15  /  ALT  19  /  AlkPhos  168<H>  03-15        CAPILLARY BLOOD GLUCOSE      POCT Blood Glucose.: 115 mg/dL (16 Mar 2023 12:03)  POCT Blood Glucose.: 133 mg/dL (15 Mar 2023 23:37)  POCT Blood Glucose.: 122 mg/dL (15 Mar 2023 17:21)    LIVER FUNCTIONS - ( 15 Mar 2023 05:30 )  Alb: 2.1 g/dL / Pro: 6.1 g/dL / ALK PHOS: 168 U/L / ALT: 19 U/L / AST: 15 U/L / GGT: 71 U/L         Cultures:Culture Results:   No growth at 12 hours (03-15 @ 14:54)  Culture Results:   No growth at 12 hours (03-15 @ 14:54)      RADIOLOGY & ADDITIONAL STUDIES:

## 2023-03-16 NOTE — PROGRESS NOTE ADULT - ASSESSMENT
78M PMHx of IVDU found unresponsive at his nursing home, resolved after Narcan in the field and brought to [facility]. Found to have PE, atrial flutter, and large LV thrombus on echo. Transferred to [facility] for further management. Pt c/o abdominal pain on 12/10 CTA showing mid SMA with embolus. Abnormal distal small bowel loops and cecum with dilatation and pneumatosis suggesting infarcted bowel. Now s/p Ex lap, SMA embolectomy, 80cm SBR, abthera vac left in discontinuity (12/11). S/p second look (12/13), s/p OR for 3rd look, end-to-end anastomosis of remaining bowel, loop ileostomy and abdomen closure (12/15). S/p LORENZO and Cardioversion on 12/30 with cardiology. Patient was nutritionally optimized. 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 s/p L AKA closure (3/1), s/p ileostomy reversal (3/7) c/b ileus with NGT decompression.    GI reconsulted for anemia, concern for coffee ground emesis    Noted impressive leukocytosis , without overt GI bleeding and stable HgB    Given ongoing infectious process, as well as Hgb stability, and Obstruction on CT scan, currently deferring on endoscopic eval  PPI BID IV  NG per primary  Please trend Hgb, BUN;CR, and Coags  Infectious workup per primary team / ID     Thank you for allowing us to participate in the care of this patient. GI will sign off the case.  Please call us if you have any further questions or concerns    Kurt Zarate M.D.  Gastroenterology Fellow  Weekday Pager: [phone]  Weeknights/Weekend Coverage: Please Call the  for contact info

## 2023-03-16 NOTE — PROGRESS NOTE ADULT - ATTENDING COMMENTS
h/o PE, DVT, IVDU s/p multiple emboli from LV thrombus requiring AKA, SBR now with fever, leukocytosis, UGI bleed  physical as above  ID suggestions noted to try tigecycline; possibility also of drug fever  continue TPN  mobilized today to chair  empiric bolus of IVF with fever/insensible loss  rest as above  decision making of high complexity

## 2023-03-17 NOTE — PROVIDER CONTACT NOTE (CRITICAL VALUE NOTIFICATION) - ACTION/TREATMENT ORDERED:
MD was made aware
Team notified pending CBC; pt currently doesn't want to be bothered
Redraw CBC
repeat CBC &EKG
Meropenum ordered
ertapenum ordered

## 2023-03-17 NOTE — PROGRESS NOTE ADULT - SUBJECTIVE AND OBJECTIVE BOX
INFECTIOUS DISEASES CONSULT FOLLOW-UP NOTE    INTERVAL HPI/OVERNIGHT EVENTS: Febrile Tmax 101.6F, received Ofrimev. NGT output 800cc, replaced 400cc. WBC decreased 33 > 22. No complaints, ROS negative.    ROS:   Constitutional, eyes, ENT, cardiovascular, respiratory, gastrointestinal, genitourinary, integumentary, neurological, psychiatric and heme/lymph are otherwise negative other than noted above     ANTIBIOTICS/RELEVANT:    MEDICATIONS  (STANDING):  aMIOdarone    Tablet 100 milliGRAM(s) Oral every 24 hours  ascorbic acid 500 milliGRAM(s) Oral daily  caspofungin IVPB 50 milliGRAM(s) IV Intermittent every 24 hours  chlorhexidine 2% Cloths 1 Application(s) Topical daily  dextrose 5%. 1000 milliLiter(s) (50 mL/Hr) IV Continuous <Continuous>  dextrose 5%. 1000 milliLiter(s) (100 mL/Hr) IV Continuous <Continuous>  dextrose 50% Injectable 25 Gram(s) IV Push once  dextrose 50% Injectable 12.5 Gram(s) IV Push once  dextrose 50% Injectable 25 Gram(s) IV Push once  fat emulsion (Fish Oil and Plant Based) 20% Infusion 0.7764 Gm/kG/Day (20.83 mL/Hr) IV Continuous <Continuous>  glucagon  Injectable 1 milliGRAM(s) IntraMuscular once  heparin  Infusion.  Unit(s)/Hr (11 mL/Hr) IV Continuous <Continuous>  influenza  Vaccine (HIGH DOSE) 0.7 milliLiter(s) IntraMuscular once  insulin lispro (ADMELOG) corrective regimen sliding scale   SubCutaneous every 6 hours  metoprolol tartrate Injectable 5 milliGRAM(s) IV Push every 6 hours  mirtazapine 30 milliGRAM(s) Oral at bedtime  multivitamin 1 Tablet(s) Oral daily  nystatin Powder 1 Application(s) Topical two times a day  pantoprazole  Injectable 40 milliGRAM(s) IV Push every 12 hours  Parenteral Nutrition - Adult 1 Each (83 mL/Hr) TPN Continuous <Continuous>  Parenteral Nutrition - Adult 1 Each (83 mL/Hr) TPN Continuous <Continuous>  povidone iodine 10% Solution 1 Application(s) Topical daily  sodium chloride 0.9% lock flush 10 milliLiter(s) IV Push every 8 hours  tigecycline IVPB      tigecycline IVPB 50 milliGRAM(s) IV Intermittent every 12 hours  zinc sulfate 220 milliGRAM(s) Oral daily    MEDICATIONS  (PRN):  benzocaine/menthol Lozenge 1 Lozenge Oral every 4 hours PRN Sore Throat  dextrose Oral Gel 15 Gram(s) Oral once PRN Blood Glucose LESS THAN 70 milliGRAM(s)/deciliter  HYDROmorphone  Injectable 0.25 milliGRAM(s) IV Push every 4 hours PRN Severe Pain (7 - 10)  ondansetron Injectable 4 milliGRAM(s) IV Push every 6 hours PRN Nausea and/or Vomiting  sodium chloride 0.9% lock flush 10 milliLiter(s) IV Push every 1 hour PRN Pre/post blood products, medications, blood draw, and to maintain line patency    Vital Signs Last 24 Hrs  T(C): 38.2 (17 Mar 2023 13:00), Max: 38.7 (16 Mar 2023 16:16)  T(F): 100.8 (17 Mar 2023 13:00), Max: 101.6 (16 Mar 2023 16:16)  HR: 115 (17 Mar 2023 13:00) (96 - 141)  BP: 142/79 (17 Mar 2023 13:00) (113/86 - 167/81)  BP(mean): 103 (17 Mar 2023 13:00) (76 - 119)  RR: 19 (17 Mar 2023 13:00) (13 - 25)  SpO2: 100% (17 Mar 2023 13:00) (94% - 100%)    Parameters below as of 17 Mar 2023 13:00  Patient On (Oxygen Delivery Method): room air    03-16-23 @ 07:01  -  03-17-23 @ 07:00  --------------------------------------------------------  IN: 5001.4 mL / OUT: 2250 mL / NET: 2751.4 mL    03-17-23 @ 07:01  -  03-17-23 @ 13:25  --------------------------------------------------------  IN: 1203 mL / OUT: 350 mL / NET: 853 mL    PHYSICAL EXAM:  Constitutional: alert, NAD  Eyes: the sclera and conjunctiva were normal.   ENT: the ears and nose were normal in appearance. NGT in place.  Neck: the appearance of the neck was normal and the neck was supple.   Pulmonary: no respiratory distress and lungs were clear to auscultation bilaterally.   Heart: heart rate was normal and rhythm regular, normal S1 and S2  Vascular: there was no peripheral edema. L AKA stump intact. RLE WWP  Abdomen: normal bowel sounds, soft, non-tender. Prior PEG site clean, minimal drainage.  Neurological: no focal deficits.   Psychiatric: the affect was normal    LABS:                        8.4    23.20 )-----------( 535      ( 17 Mar 2023 06:01 )             25.5     03-17    139  |  107  |  14  ----------------------------<  111<H>  3.7   |  25  |  0.44<L>    Ca    8.4      17 Mar 2023 06:01  Phos  3.5     03-17  Mg     2.0     03-17    PT/INR - ( 17 Mar 2023 10:19 )   PT: 16.6 sec;   INR: 1.39        PTT - ( 17 Mar 2023 10:19 )  PTT:32.1 sec    MICROBIOLOGY:    RADIOLOGY & ADDITIONAL STUDIES:  Reviewed

## 2023-03-17 NOTE — PROGRESS NOTE ADULT - ATTENDING COMMENTS
IVDU, multiple emboli from LV thrombus s/p PE, left AKA, SBR with multiple infections, washout, takedown of ileostomy, fever, UGI bleed  physical as above  WBC continues to decrease  follow fever curve on tigecycline  continue to replenish IVF for insensible losses  continue TPN  follow on heparin  continue protonix  decision making of high complexity

## 2023-03-17 NOTE — PROVIDER CONTACT NOTE (CRITICAL VALUE NOTIFICATION) - BACKGROUND
pt recently got ileostomy reversal on 3/7/2023
pt admitted ex lap Recently  AKA on 2/15
A fib  A flutter

## 2023-03-17 NOTE — PROGRESS NOTE ADULT - ASSESSMENT
77 yo male with prolonged course c/b mesenteric ischemia s/p SBR and development of RLQ abscess, LLE ischemia s/p L AKA 2/15; VRE faecium and ESBL Klebsiella BSI (enteric napoleon) likely 2/2 intra-abdominal abscess s/p drainage.  Patient afebrile since drainage.  Fluid cultures from OR with Klebsiella suggesting this is the source of the bacteremia. Now culture with Candida albicans and still febrile with leukocytosis. Abdominal fluid collection not drained. Now with GIB at risk for breakthrough bacteremia. Still with intermittent fevers. WBC decreased from yesterday 33 > 22.  - Reengage palliative care  - C/w to IV Tigecycline 50mg q12hr (completed 100mg loading dose)  - Continue Caspofungin 50mg IV daily (completed 1x 70 mg IV load)  - Monitor QTc    ID team 1 will follow

## 2023-03-17 NOTE — PROGRESS NOTE ADULT - ASSESSMENT
77M PMHx of IVDU found unresponsive at his nursing home, resolved after Narcan in the field and brought to [facility]. Found to have PE, atrial flutter, and large LV thrombus on echo. Transferred to [facility] for further management. Pt c/o abdominal pain on 12/10 CTA showing mid SMA with embolus. Abnormal distal small bowel loops and cecum with dilatation and pneumatosis suggesting infarcted bowel. Now s/p Ex lap, SMA embolectomy, 80cm SBR, abthera vac left in discontinuity (12/11). S/p second look (12/13), s/p OR for 3rd look, end-to-end anastomosis of remaining bowel, loop ileostomy and abdomen closure (12/15). S/p LORENZO and Cardioversion on 12/30 with cardiology. Patient was nutritionally optimized. 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 s/p L AKA closure (3/1), s/p ileostomy reversal (3/7). Patient with noted coffee gorund/dark red drainage from NGT, Hgb found to be 4 and 5, upgraded to SICU for HD monitoring. Passing flatus, NGT output now clear from coffee ground 03/16.    Plan:    -NPO  -TPN  -NGT to LIWS  -PPI  -F/u GI recs  -F/u vasc recs  -F/u ID recs  -Monitor fever curve  -Cont abx  -Rest of care per SICU  -Surgery Team 4C will continue to follow. Please page Team 4 with questions/clinical changes. [phone]

## 2023-03-17 NOTE — PROGRESS NOTE ADULT - ASSESSMENT
77M PMHx of IVDU found unresponsive at his nursing home, resolved after Narcan in the field and brought to [facility]. Found to have PE, atrial flutter, and large LV thrombus on echo. Transferred to [facility] for further management. Pt c/o abdominal pain on 12/10 CTA showing mid SMA with embolus. Abnormal distal small bowel loops and cecum with dilatation and pneumatosis suggesting infarcted bowel. Now s/p Ex lap, SMA embolectomy, 80cm SBR, abthera vac left in discontinuity (12/11). S/p second look (12/13), s/p OR for 3rd look, end-to-end anastomosis of remaining bowel, loop ileostomy and abdomen closure (12/15). S/p LORENZO and Cardioversion on 12/30 with cardiology. Patient was nutritionally optimized. 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 s/p L AKA closure (3/1), s/p ileostomy reversal (3/7) c/b ileus.  Course c/b upper GI bleeding 3/13 w/ coffee ground emesis (resolved). In SICU for fevers and leukocytosis.     Neuro: Pain: LidoDerm, Tylenol PRN, Dilaudid PRN. Nausea: Zofran PRN.   ENT: Cepacol for sore throat  CV: Tachycardic 2/2 fever. Repeat echo with EF (02/11) 55-60%. Mildly dilated RV. Maintain MAP >65. A.Fib/Flutter: s/p cardioversion on 12/30. Cont metoprolol/amiodarone; hx HTN: holding spironolactone, Entresto.  Pulm: Sating well on RA  GI/FEN: s/p ostomy reversal (3/7). Upper GI bleed (resolved): on PPI BID. SBO (resolving?): NGT to LIWS, NPO. PICC w TPN. Appetite: Mirtazapine, VitC, MTV, Zinc.  : Voiding, Strict I and O.   Endo: mISS  Heme: LV thrombis? w/ LLE ischemia: Heparin gtt held after GI bleed, HSQ resumed. Active T&S;   ID: Leukocytosis and fevers: LLE ischemia s/p L AKA 2/15; VRE faecium and ESBL Klebsiella BSI (enteric napoleon), tigecycline (3/16--), Caspo (3/14--).  // DC: Zosyn. Vanc (2/10--2/11), Fluconazole (3/9-13), Erta (2/13-3/10; 3/13), Dapto 2/11-3/9; 3/10-16), Tobra (3/13-16), Luis (2/10-12, 3/13-16)  PPX: SCDs, restarting HSQ  L/D/T: PIV  PT/OT: Not ordered  Dispo: SICU   77M PMHx of IVDU found unresponsive at his nursing home, resolved after Narcan in the field and brought to [facility]. Found to have PE, atrial flutter, and large LV thrombus on echo. Transferred to [facility] for further management. Pt c/o abdominal pain on 12/10 CTA showing mid SMA with embolus. Abnormal distal small bowel loops and cecum with dilatation and pneumatosis suggesting infarcted bowel. Now s/p Ex lap, SMA embolectomy, 80cm SBR, abthera vac left in discontinuity (12/11). S/p second look (12/13), s/p OR for 3rd look, end-to-end anastomosis of remaining bowel, loop ileostomy and abdomen closure (12/15). S/p LORENZO and Cardioversion on 12/30 with cardiology. Patient was nutritionally optimized. 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 s/p L AKA closure (3/1), s/p ileostomy reversal (3/7) c/b ileus.  Course c/b upper GI bleeding 3/13 w/ coffee ground emesis (resolved). In SICU for fevers and leukocytosis.     Neuro: Pain: LidoDerm, Tylenol PRN, Dilaudid PRN. Nausea: Zofran PRN.   ENT: Cepacol for sore throat  CV: Tachycardia 2/2 likely hypovolemia vs. fever vs. re-developing sepsis vs. PE. Will give 1L bolus. Repeat echo with EF (02/11) 55-60%. Mildly dilated RV. Maintain MAP >65. A.Fib/Flutter: s/p cardioversion on 12/30. Cont metoprolol/amiodarone; hx HTN: holding spironolactone, Entresto.  Pulm: Satting well on RA  GI/FEN: s/p ostomy reversal (3/7). Upper GI bleed (resolved): on PPI BID. SBO (resolving?): NGT to LIWS, NPO. PICC w TPN. Appetite: Mirtazapine, VitC, MTV, Zinc.  : Voiding, Strict I and O.   Endo: mISS  Heme: LV thrombis? w/ LLE ischemia: Heparin gtt held after GI bleed, HSQ resumed. Active T&S;   ID: Leukocytosis and fevers: LLE ischemia s/p L AKA 2/15; VRE faecium and ESBL Klebsiella BSI (enteric napoleon), tigecycline (3/16--), Caspo (3/14--).  // DC: Zosyn. Vanc (2/10--2/11), Fluconazole (3/9-13), Erta (2/13-3/10; 3/13), Dapto 2/11-3/9; 3/10-16), Tobra (3/13-16), Luis (2/10-12, 3/13-16)  PPX: SCDs, restarting HSQ  L/D/T: PIV  PT/OT: Not ordered  Dispo: SICU

## 2023-03-17 NOTE — PROGRESS NOTE ADULT - SUBJECTIVE AND OBJECTIVE BOX
ON: Tmax 101.6, , replaced 400 bolus. Ofirmev given.   3/16: CBC 6.8, repeat 8.2, gave cepacol or sore throat, passing gas, switch to PPI BID. OOBTC, tobra 1.2 - continuing same does, HSQ restart, gave 1L bolus, ID - stop Daptomycin, Meropenem & Tobramycin, switch to IV Tigecycline: give 100mg loading dose x1, then 12hrs later start 50mg q12h, Continue Caspofungin 50mg IV daily (completed 1x 70 mg IV load), reports confusion and thinking he saw a friend     SUBJECTIVE: Patient seen and examined bedside; sleepy this am, said that feels tired, didn't want to participate in exam.    aMIOdarone    Tablet 100 milliGRAM(s) Oral every 24 hours  caspofungin IVPB 50 milliGRAM(s) IV Intermittent every 24 hours  heparin   Injectable 5000 Unit(s) SubCutaneous every 8 hours  metoprolol tartrate Injectable 5 milliGRAM(s) IV Push every 6 hours  tigecycline IVPB      tigecycline IVPB 50 milliGRAM(s) IV Intermittent every 12 hours      Vital Signs Last 24 Hrs  T(C): 38.3 (17 Mar 2023 04:44), Max: 38.7 (16 Mar 2023 16:16)  T(F): 101 (17 Mar 2023 04:44), Max: 101.6 (16 Mar 2023 16:16)  HR: 123 (17 Mar 2023 06:00) (101 - 141)  BP: 165/89 (17 Mar 2023 06:00) (115/66 - 167/81)  BP(mean): 119 (17 Mar 2023 06:00) (76 - 119)  RR: 17 (17 Mar 2023 06:00) (13 - 28)  SpO2: 100% (17 Mar 2023 06:00) (92% - 100%)    Parameters below as of 17 Mar 2023 07:00  Patient On (Oxygen Delivery Method): room air      I&O's Detail    15 Mar 2023 07:01  -  16 Mar 2023 07:00  --------------------------------------------------------  IN:    Fat Emulsion (Fish Oil &amp; Plant Based) 20% Infusion: 20.8 mL    Fat Emulsion (Fish Oil &amp; Plant Based) 20% Infusion: 249.6 mL    IV PiggyBack: 250 mL    IV PiggyBack: 100 mL    IV PiggyBack: 100 mL    Lactated Ringers Bolus: 500 mL    Pantoprazole: 240 mL    Sodium Chloride 0.9% Bolus: 500 mL    Sodium Chloride 0.9% Bolus: 1000 mL    TPN (Total Parenteral Nutrition): 1992 mL  Total IN: 4952.4 mL    OUT:    Incontinent per Condom Catheter (mL): 975 mL    Indwelling Catheter - Urethral (mL): 815 mL    Nasogastric/Oral tube (mL): 2800 mL  Total OUT: 4590 mL    Total NET: 362.4 mL      16 Mar 2023 07:01  -  17 Mar 2023 06:50  --------------------------------------------------------  IN:    Fat Emulsion (Fish Oil &amp; Plant Based) 20% Infusion: 270.4 mL    IV PiggyBack: 200 mL    IV PiggyBack: 1000 mL    IV PiggyBack: 110 mL    IV PiggyBack: 350 mL    IV PiggyBack: 50 mL    Pantoprazole: 30 mL    Sodium Chloride 0.9% Bolus: 999 mL    TPN (Total Parenteral Nutrition): 1992 mL  Total IN: 5001.4 mL    OUT:    Fat Emulsion (Fish Oil &amp; Plant Based) 20% Infusion: 0 mL    Incontinent per Condom Catheter (mL): 450 mL    Nasogastric/Oral tube (mL): 1400 mL  Total OUT: 1850 mL    Total NET: 3151.4 mL      PE:    General: NAD, resting comfortably in bed  HEENT: NGT appropriately positioned, draining clear fluid  C/V: S1 s2, NSR tachycardic  Pulm: Nonlabored breathing, no respiratory distress  Abd: Soft, NTND, prior ostomy site covered with 4x4, no obvious signs of bleeding or infection  Extrem: L LE stump wrapped with ace no obvious bleeding        LABS:                        8.4    23.20 )-----------( 535      ( 17 Mar 2023 06:01 )             25.5     03-17    139  |  107  |  14  ----------------------------<  111<H>  3.7   |  25  |  0.44<L>    Ca    8.4      17 Mar 2023 06:01  Phos  3.5     03-17  Mg     2.0     03-17            RADIOLOGY & ADDITIONAL STUDIES:

## 2023-03-17 NOTE — PROGRESS NOTE ADULT - SUBJECTIVE AND OBJECTIVE BOX
ON: Tmax 101.6, , replaced 400 bolus. Ofirmev given.      SUBJECTIVE: Patient seen and examined bedside by SICU team. Patient reports mild diffuse abdominal pain. Denies subjective fever or chills currently, -CP/SOB/N/V.    aMIOdarone    Tablet 100 milliGRAM(s) Oral every 24 hours  caspofungin IVPB 50 milliGRAM(s) IV Intermittent every 24 hours  heparin   Injectable 5000 Unit(s) SubCutaneous every 8 hours  metoprolol tartrate Injectable 5 milliGRAM(s) IV Push every 6 hours  tigecycline IVPB      tigecycline IVPB 50 milliGRAM(s) IV Intermittent every 12 hours      Vital Signs Last 24 Hrs  T(C): 38.3 (17 Mar 2023 04:44), Max: 38.7 (16 Mar 2023 16:16)  T(F): 101 (17 Mar 2023 04:44), Max: 101.6 (16 Mar 2023 16:16)  HR: 102 (17 Mar 2023 07:00) (101 - 141)  BP: 137/75 (17 Mar 2023 07:00) (115/66 - 167/81)  BP(mean): 102 (17 Mar 2023 07:00) (76 - 119)  RR: 19 (17 Mar 2023 07:00) (13 - 28)  SpO2: 99% (17 Mar 2023 07:00) (95% - 100%)    Parameters below as of 17 Mar 2023 07:00  Patient On (Oxygen Delivery Method): room air      I&O's Detail    16 Mar 2023 07:01  -  17 Mar 2023 07:00  --------------------------------------------------------  IN:    Fat Emulsion (Fish Oil &amp; Plant Based) 20% Infusion: 270.4 mL    IV PiggyBack: 50 mL    IV PiggyBack: 200 mL    IV PiggyBack: 1000 mL    IV PiggyBack: 110 mL    IV PiggyBack: 350 mL    Pantoprazole: 30 mL    Sodium Chloride 0.9% Bolus: 999 mL    TPN (Total Parenteral Nutrition): 1992 mL  Total IN: 5001.4 mL    OUT:    Fat Emulsion (Fish Oil &amp; Plant Based) 20% Infusion: 0 mL    Incontinent per Condom Catheter (mL): 450 mL    Nasogastric/Oral tube (mL): 1400 mL  Total OUT: 1850 mL    Total NET: 3151.4 mL          Physical Exam:    General: NAD  HEENT: NC/AT, dry mucus membranes. NG tube in place with dark bilious output.  Pulmonary: Nonlabored breathing, no respiratory distress, CTAB  Cardiovascular: tachycardic, no murmurs  Abdominal: soft, diffusely tender (L>R) without guarding or rigidity, ND, midline incision well healing, ostomy reversal site with wet to dry dressing  Extremities: Left stump wrapped without strikethrough, WWP, no clubbing/cyanosis/edema  Neuro: A/O x3, CNs II-XII grossly intact,         LABS:                        8.4    23.20 )-----------( 535      ( 17 Mar 2023 06:01 )             25.5     03-17    139  |  107  |  14  ----------------------------<  111<H>  3.7   |  25  |  0.44<L>    Ca    8.4      17 Mar 2023 06:01  Phos  3.5     03-17  Mg     2.0     03-17            RADIOLOGY & ADDITIONAL STUDIES:   ON: Tmax 101.6, , replaced 400 bolus. Ofirmev given.      SUBJECTIVE: Patient seen and examined bedside by SICU team. Patient reports mild diffuse abdominal pain. Passing flatus but no bowel movements. Denies subjective fever or chills currently, -CP/SOB/N/V.    aMIOdarone    Tablet 100 milliGRAM(s) Oral every 24 hours  caspofungin IVPB 50 milliGRAM(s) IV Intermittent every 24 hours  heparin   Injectable 5000 Unit(s) SubCutaneous every 8 hours  metoprolol tartrate Injectable 5 milliGRAM(s) IV Push every 6 hours  tigecycline IVPB      tigecycline IVPB 50 milliGRAM(s) IV Intermittent every 12 hours      Vital Signs Last 24 Hrs  T(C): 38.3 (17 Mar 2023 04:44), Max: 38.7 (16 Mar 2023 16:16)  T(F): 101 (17 Mar 2023 04:44), Max: 101.6 (16 Mar 2023 16:16)  HR: 102 (17 Mar 2023 07:00) (101 - 141)  BP: 137/75 (17 Mar 2023 07:00) (115/66 - 167/81)  BP(mean): 102 (17 Mar 2023 07:00) (76 - 119)  RR: 19 (17 Mar 2023 07:00) (13 - 28)  SpO2: 99% (17 Mar 2023 07:00) (95% - 100%)    Parameters below as of 17 Mar 2023 07:00  Patient On (Oxygen Delivery Method): room air      I&O's Detail    16 Mar 2023 07:01  -  17 Mar 2023 07:00  --------------------------------------------------------  IN:    Fat Emulsion (Fish Oil &amp; Plant Based) 20% Infusion: 270.4 mL    IV PiggyBack: 50 mL    IV PiggyBack: 200 mL    IV PiggyBack: 1000 mL    IV PiggyBack: 110 mL    IV PiggyBack: 350 mL    Pantoprazole: 30 mL    Sodium Chloride 0.9% Bolus: 999 mL    TPN (Total Parenteral Nutrition): 1992 mL  Total IN: 5001.4 mL    OUT:    Fat Emulsion (Fish Oil &amp; Plant Based) 20% Infusion: 0 mL    Incontinent per Condom Catheter (mL): 450 mL    Nasogastric/Oral tube (mL): 1400 mL  Total OUT: 1850 mL    Total NET: 3151.4 mL          Physical Exam:    General: NAD  HEENT: NC/AT, dry mucus membranes. NG tube in place with dark bilious output.  Pulmonary: Nonlabored breathing, no respiratory distress, CTAB  Cardiovascular: tachycardic, no murmurs  Abdominal: soft, diffusely tender (L>R) without guarding or rigidity, ND, midline incision well healing, ostomy reversal site with wet to dry dressing  Extremities: Left stump wrapped without strikethrough, WWP, no clubbing/cyanosis/edema  Neuro: A/O x3, CNs II-XII grossly intact,         LABS:                        8.4    23.20 )-----------( 535      ( 17 Mar 2023 06:01 )             25.5     03-17    139  |  107  |  14  ----------------------------<  111<H>  3.7   |  25  |  0.44<L>    Ca    8.4      17 Mar 2023 06:01  Phos  3.5     03-17  Mg     2.0     03-17            RADIOLOGY & ADDITIONAL STUDIES:   ON: Tmax 101.6, tachycardic to 140s, now down to 100s, , replaced 400 bolus. Ofirmev given.     SUBJECTIVE: Patient seen and examined bedside by SICU team. Patient reports mild diffuse abdominal pain. Passing flatus but no bowel movements. Denies subjective fever or chills currently, -CP/SOB/N/V.    aMIOdarone    Tablet 100 milliGRAM(s) Oral every 24 hours  caspofungin IVPB 50 milliGRAM(s) IV Intermittent every 24 hours  heparin   Injectable 5000 Unit(s) SubCutaneous every 8 hours  metoprolol tartrate Injectable 5 milliGRAM(s) IV Push every 6 hours  tigecycline IVPB      tigecycline IVPB 50 milliGRAM(s) IV Intermittent every 12 hours      Vital Signs Last 24 Hrs  T(C): 38.3 (17 Mar 2023 04:44), Max: 38.7 (16 Mar 2023 16:16)  T(F): 101 (17 Mar 2023 04:44), Max: 101.6 (16 Mar 2023 16:16)  HR: 102 (17 Mar 2023 07:00) (101 - 141)  BP: 137/75 (17 Mar 2023 07:00) (115/66 - 167/81)  BP(mean): 102 (17 Mar 2023 07:00) (76 - 119)  RR: 19 (17 Mar 2023 07:00) (13 - 28)  SpO2: 99% (17 Mar 2023 07:00) (95% - 100%)    Parameters below as of 17 Mar 2023 07:00  Patient On (Oxygen Delivery Method): room air      I&O's Detail    16 Mar 2023 07:01  -  17 Mar 2023 07:00  --------------------------------------------------------  IN:    Fat Emulsion (Fish Oil &amp; Plant Based) 20% Infusion: 270.4 mL    IV PiggyBack: 50 mL    IV PiggyBack: 200 mL    IV PiggyBack: 1000 mL    IV PiggyBack: 110 mL    IV PiggyBack: 350 mL    Pantoprazole: 30 mL    Sodium Chloride 0.9% Bolus: 999 mL    TPN (Total Parenteral Nutrition): 1992 mL  Total IN: 5001.4 mL    OUT:    Fat Emulsion (Fish Oil &amp; Plant Based) 20% Infusion: 0 mL    Incontinent per Condom Catheter (mL): 450 mL    Nasogastric/Oral tube (mL): 1400 mL  Total OUT: 1850 mL    Total NET: 3151.4 mL          Physical Exam:    General: NAD  HEENT: NC/AT, dry mucus membranes. NG tube in place with dark bilious output.  Pulmonary: Nonlabored breathing, no respiratory distress, CTAB  Cardiovascular: tachycardic, no murmurs  Abdominal: soft, diffusely tender (L>R) without guarding or rigidity, ND, midline incision well healing, ostomy reversal site with wet to dry dressing  Extremities: Left stump wrapped without strikethrough, WWP, no clubbing/cyanosis/edema  Neuro: A/O x3, CNs II-XII grossly intact,         LABS:                        8.4    23.20 )-----------( 535      ( 17 Mar 2023 06:01 )             25.5     03-17    139  |  107  |  14  ----------------------------<  111<H>  3.7   |  25  |  0.44<L>    Ca    8.4      17 Mar 2023 06:01  Phos  3.5     03-17  Mg     2.0     03-17            RADIOLOGY & ADDITIONAL STUDIES:

## 2023-03-18 NOTE — PROGRESS NOTE ADULT - SUBJECTIVE AND OBJECTIVE BOX
STATUS POST:       SUBJECTIVE: Patient seen and examined bedside by chief resident.    aMIOdarone Infusion 1 mG/Min IV Continuous <Continuous>  caspofungin IVPB 50 milliGRAM(s) IV Intermittent every 24 hours  heparin  Infusion 1200 Unit(s)/Hr IV Continuous <Continuous>  metoprolol tartrate Injectable 5 milliGRAM(s) IV Push every 6 hours  tigecycline IVPB      tigecycline IVPB 50 milliGRAM(s) IV Intermittent every 12 hours      Vital Signs Last 24 Hrs  T(C): 38.6 (18 Mar 2023 09:00), Max: 38.9 (18 Mar 2023 07:00)  T(F): 101.4 (18 Mar 2023 09:00), Max: 102 (18 Mar 2023 07:00)  HR: 109 (18 Mar 2023 09:00) (100 - 138)  BP: 98/61 (18 Mar 2023 09:00) (97/60 - 168/85)  BP(mean): 72 (18 Mar 2023 09:00) (71 - 138)  RR: 23 (18 Mar 2023 09:00) (11 - 27)  SpO2: 100% (18 Mar 2023 09:00) (92% - 100%)    Parameters below as of 18 Mar 2023 09:00  Patient On (Oxygen Delivery Method): nasal cannula  O2 Flow (L/min): 2    I&O's Detail    17 Mar 2023 07:01  -  18 Mar 2023 07:00  --------------------------------------------------------  IN:    Albumin 5%  - 250 mL: 250 mL    Amiodarone: 33.3 mL    Fat Emulsion (Fish Oil &amp; Plant Based) 20% Infusion: 145.6 mL    Heparin: 86 mL    Heparin Infusion: 66 mL    IV PiggyBack: 500 mL    IV PiggyBack: 500 mL    TPN (Total Parenteral Nutrition): 1411 mL  Total IN: 2991.9 mL    OUT:    Fat Emulsion (Fish Oil &amp; Plant Based) 20% Infusion: 0 mL    Incontinent per Condom Catheter (mL): 200 mL    Nasogastric/Oral tube (mL): 300 mL    Voided (mL): 950 mL  Total OUT: 1450 mL    Total NET: 1541.9 mL      18 Mar 2023 07:01  -  18 Mar 2023 09:36  --------------------------------------------------------  IN:    Amiodarone: 66.6 mL    Heparin: 26 mL    TPN (Total Parenteral Nutrition): 166 mL  Total IN: 258.6 mL    OUT:  Total OUT: 0 mL    Total NET: 258.6 mL          General: NAD, NGT in place draining dark coffee ground fluid  C/V: NSR  Pulm: Nonlabored breathing, no respiratory distress  Abd: soft, exquisitely TTP/moderately distended, prior ostomy site covered with 4x4, no obvious signs of bleeding or   Extrem: s/p L AKA wrapped w/gauze, R LE w/offloading boot        LABS:                        9.0    13.83 )-----------( 601      ( 18 Mar 2023 05:45 )             27.4     03-18    135  |  102  |  14  ----------------------------<  142<H>  4.1   |  22  |  0.38<L>    Ca    7.6<L>      18 Mar 2023 05:45  Phos  3.8     03-18  Mg     1.8     03-18    TPro  5.8<L>  /  Alb  1.9<L>  /  TBili  0.8  /  DBili  x   /  AST  15  /  ALT  20  /  AlkPhos  161<H>  03-18    PT/INR - ( 18 Mar 2023 05:45 )   PT: 15.8 sec;   INR: 1.32          PTT - ( 18 Mar 2023 05:45 )  PTT:45.1 sec      RADIOLOGY & ADDITIONAL STUDIES:   STATUS POST: 12/11: Ex lap, SMA embolectomy, 80cm SBR, abthera vac  12/13: Second look, SBR  12/15: Ex-lap, no dead gut, DANIEL of discontinuous gut, loop ileostomy; EBL minimal, 1L crystalloid, UOP 150mL   12/30: LORENZO & Cardioversion on 12/30.   2/15: L AKA guillotine, EBL 10cc, IVF 400cc,   3/1: L AKA closure.   3/7: Ileostomy reversal. EBL 20, ,       SUBJECTIVE: Patient seen and examined bedside by chief resident. Patient has continued abdominal discomfort and feels unwell. NGT in place.    aMIOdarone Infusion 1 mG/Min IV Continuous <Continuous>  caspofungin IVPB 50 milliGRAM(s) IV Intermittent every 24 hours  heparin  Infusion 1200 Unit(s)/Hr IV Continuous <Continuous>  metoprolol tartrate Injectable 5 milliGRAM(s) IV Push every 6 hours  tigecycline IVPB      tigecycline IVPB 50 milliGRAM(s) IV Intermittent every 12 hours      Vital Signs Last 24 Hrs  T(C): 38.6 (18 Mar 2023 09:00), Max: 38.9 (18 Mar 2023 07:00)  T(F): 101.4 (18 Mar 2023 09:00), Max: 102 (18 Mar 2023 07:00)  HR: 109 (18 Mar 2023 09:00) (100 - 138)  BP: 98/61 (18 Mar 2023 09:00) (97/60 - 168/85)  BP(mean): 72 (18 Mar 2023 09:00) (71 - 138)  RR: 23 (18 Mar 2023 09:00) (11 - 27)  SpO2: 100% (18 Mar 2023 09:00) (92% - 100%)    Parameters below as of 18 Mar 2023 09:00  Patient On (Oxygen Delivery Method): nasal cannula  O2 Flow (L/min): 2    I&O's Detail    17 Mar 2023 07:01  -  18 Mar 2023 07:00  --------------------------------------------------------  IN:    Albumin 5%  - 250 mL: 250 mL    Amiodarone: 33.3 mL    Fat Emulsion (Fish Oil &amp; Plant Based) 20% Infusion: 145.6 mL    Heparin: 86 mL    Heparin Infusion: 66 mL    IV PiggyBack: 500 mL    IV PiggyBack: 500 mL    TPN (Total Parenteral Nutrition): 1411 mL  Total IN: 2991.9 mL    OUT:    Fat Emulsion (Fish Oil &amp; Plant Based) 20% Infusion: 0 mL    Incontinent per Condom Catheter (mL): 200 mL    Nasogastric/Oral tube (mL): 300 mL    Voided (mL): 950 mL  Total OUT: 1450 mL    Total NET: 1541.9 mL      18 Mar 2023 07:01  -  18 Mar 2023 09:36  --------------------------------------------------------  IN:    Amiodarone: 66.6 mL    Heparin: 26 mL    TPN (Total Parenteral Nutrition): 166 mL  Total IN: 258.6 mL    OUT:  Total OUT: 0 mL    Total NET: 258.6 mL          General: NAD, NGT in place draining dark coffee ground fluid  C/V: NSR  Pulm: Nonlabored breathing, no respiratory distress  Abd: soft, exquisitely TTP/moderately distended, prior ostomy site covered with 4x4, no obvious signs of bleeding or infection  Extrem: s/p L AKA wrapped w/gauze, R LE w/offloading boot        LABS:                        9.0    13.83 )-----------( 601      ( 18 Mar 2023 05:45 )             27.4     03-18    135  |  102  |  14  ----------------------------<  142<H>  4.1   |  22  |  0.38<L>    Ca    7.6<L>      18 Mar 2023 05:45  Phos  3.8     03-18  Mg     1.8     03-18    TPro  5.8<L>  /  Alb  1.9<L>  /  TBili  0.8  /  DBili  x   /  AST  15  /  ALT  20  /  AlkPhos  161<H>  03-18    PT/INR - ( 18 Mar 2023 05:45 )   PT: 15.8 sec;   INR: 1.32          PTT - ( 18 Mar 2023 05:45 )  PTT:45.1 sec      RADIOLOGY & ADDITIONAL STUDIES:

## 2023-03-18 NOTE — PROGRESS NOTE ADULT - ASSESSMENT
77M PMHx of IVDU found unresponsive at his nursing home, resolved after Narcan in the field and brought to [facility]. Found to have PE, atrial flutter, and large LV thrombus on echo. Transferred to [facility] for further management. Pt c/o abdominal pain on 12/10 CTA showing mid SMA with embolus. Abnormal distal small bowel loops and cecum with dilatation and pneumatosis suggesting infarcted bowel. Now s/p Ex lap, SMA embolectomy, 80cm SBR, abthera vac left in discontinuity (12/11). S/p second look (12/13), s/p OR for 3rd look, end-to-end anastomosis of remaining bowel, loop ileostomy and abdomen closure (12/15). S/p LORENZO and Cardioversion on 12/30 with cardiology. Patient was nutritionally optimized. 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 s/p L AKA closure (3/1), s/p ileostomy reversal (3/7). Patient with noted coffee gorund/dark red drainage from NGT, Hgb found to be 4 and 5, upgraded to SICU for HD monitoring. Passing flatus, NGT output now clear from coffee ground 03/16. 77M PMHx of IVDU found unresponsive at his nursing home, resolved after Narcan in the field and brought to [facility]. Found to have PE, atrial flutter, and large LV thrombus on echo. Transferred to [facility] for further management. Pt c/o abdominal pain on 12/10 CTA showing mid SMA with embolus. Abnormal distal small bowel loops and cecum with dilatation and pneumatosis suggesting infarcted bowel. Now s/p Ex lap, SMA embolectomy, 80cm SBR, abthera vac left in discontinuity (12/11). S/p second look (12/13), s/p OR for 3rd look, end-to-end anastomosis of remaining bowel, loop ileostomy and abdomen closure (12/15). S/p LORENZO and Cardioversion on 12/30 with cardiology. Patient was nutritionally optimized. 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 s/p L AKA closure (3/1), s/p ileostomy reversal (3/7). Patient with noted coffee gorund/dark red drainage from NGT, Hgb found to be 4 and 5, upgraded to SICU for HD monitoring. Passing flatus, NGT output now clear from coffee ground 03/16.    Plan  Continue abx  MARK  NPO/IVF  NGT - LIWS  Care per SICU

## 2023-03-18 NOTE — PROGRESS NOTE ADULT - SUBJECTIVE AND OBJECTIVE BOX
This is a 77M PMHx of IVDU found unresponsive at his nursing home, resolved after Narcan in the field and brought to [facility]. Found to have PE, atrial flutter, and large LV thrombus on echo. Transferred to [facility] for further management. Pt c/o abdominal pain on 12/10 CTA showing mid SMA with embolus. Abnormal distal small bowel loops and cecum with dilatation and pneumatosis suggesting infarcted bowel. Now s/p Ex lap, SMA embolectomy, 80cm SBR, abthera vac left in discontinuity (12/11). S/p second look (12/13), s/p OR for 3rd look, end-to-end anastomosis of remaining bowel, loop ileostomy and abdomen closure (12/15). S/p LORENZO and Cardioversion on 12/30 with cardiology. Patient was nutritionally optimized. 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 s/p L AKA closure (3/1), s/p ileostomy reversal (3/7) c/b ileus with course c/b upper GI bleeding 3/13 w/ coffee ground emesis (resolved) now with fevers and ileus.      INTERVAL HPI/OVERNIGHT EVENTS:  - Episodic tachycardia to 130s overnight. Amiodarone gtt restarted.   - ABXr without major findings  - Febrile intermittently (this am Tmax 38.9).   - BP stable.     SUBJECTIVE:  - Notes moderate abdominal pain  - Denies nausea with NGT in place  - No flatus/BM in 24 hr.    MEDICATIONS  (STANDING):  aMIOdarone Infusion 1 mG/Min (33.3 mL/Hr) IV Continuous <Continuous>  ascorbic acid 500 milliGRAM(s) Oral daily  caspofungin IVPB 50 milliGRAM(s) IV Intermittent every 24 hours  chlorhexidine 2% Cloths 1 Application(s) Topical daily  dextrose 5%. 1000 milliLiter(s) (50 mL/Hr) IV Continuous <Continuous>  dextrose 5%. 1000 milliLiter(s) (100 mL/Hr) IV Continuous <Continuous>  dextrose 50% Injectable 25 Gram(s) IV Push once  dextrose 50% Injectable 12.5 Gram(s) IV Push once  dextrose 50% Injectable 25 Gram(s) IV Push once  fat emulsion (Fish Oil and Plant Based) 20% Infusion 0.7764 Gm/kG/Day (20.83 mL/Hr) IV Continuous <Continuous>  glucagon  Injectable 1 milliGRAM(s) IntraMuscular once  heparin  Infusion 1200 Unit(s)/Hr (13 mL/Hr) IV Continuous <Continuous>  influenza  Vaccine (HIGH DOSE) 0.7 milliLiter(s) IntraMuscular once  insulin lispro (ADMELOG) corrective regimen sliding scale   SubCutaneous every 6 hours  lactated ringers Bolus 500 milliLiter(s) IV Bolus once  magnesium sulfate  IVPB 2 Gram(s) IV Intermittent once  metoprolol tartrate Injectable 5 milliGRAM(s) IV Push every 6 hours  mirtazapine 30 milliGRAM(s) Oral at bedtime  multivitamin 1 Tablet(s) Oral daily  nystatin Powder 1 Application(s) Topical two times a day  pantoprazole  Injectable 40 milliGRAM(s) IV Push every 12 hours  Parenteral Nutrition - Adult 1 Each (83 mL/Hr) TPN Continuous <Continuous>  Parenteral Nutrition - Adult 1 Each (83 mL/Hr) TPN Continuous <Continuous>  povidone iodine 10% Solution 1 Application(s) Topical daily  sodium chloride 0.9% lock flush 10 milliLiter(s) IV Push every 8 hours  tigecycline IVPB      tigecycline IVPB 50 milliGRAM(s) IV Intermittent every 12 hours  zinc sulfate 220 milliGRAM(s) Oral daily    MEDICATIONS  (PRN):  benzocaine/menthol Lozenge 1 Lozenge Oral every 4 hours PRN Sore Throat  dextrose Oral Gel 15 Gram(s) Oral once PRN Blood Glucose LESS THAN 70 milliGRAM(s)/deciliter  HYDROmorphone  Injectable 0.25 milliGRAM(s) IV Push every 4 hours PRN Severe Pain (7 - 10)  ondansetron Injectable 4 milliGRAM(s) IV Push every 6 hours PRN Nausea and/or Vomiting  sodium chloride 0.9% lock flush 10 milliLiter(s) IV Push every 1 hour PRN Pre/post blood products, medications, blood draw, and to maintain line patency      Drug Dosing Weight  Height (cm): 198.1 (07 Mar 2023 11:18)  Weight (kg): 64.4 (07 Mar 2023 11:18)  BMI (kg/m2): 16.4 (07 Mar 2023 11:18)  BSA (m2): 1.95 (07 Mar 2023 11:18)    PAST MEDICAL & SURGICAL HISTORY:      ICU Vital Signs Last 24 Hrs  T(C): 38.6 (18 Mar 2023 09:00), Max: 38.9 (18 Mar 2023 07:00)  T(F): 101.4 (18 Mar 2023 09:00), Max: 102 (18 Mar 2023 07:00)  HR: 109 (18 Mar 2023 09:00) (100 - 138)  BP: 98/61 (18 Mar 2023 09:00) (97/60 - 168/85)  BP(mean): 72 (18 Mar 2023 09:00) (71 - 138)  ABP: --  ABP(mean): --  RR: 23 (18 Mar 2023 09:00) (11 - 27)  SpO2: 100% (18 Mar 2023 09:00) (92% - 100%)    O2 Parameters below as of 18 Mar 2023 09:00  Patient On (Oxygen Delivery Method): nasal cannula  O2 Flow (L/min): 2                17 Mar 2023 07:01  -  18 Mar 2023 07:00  --------------------------------------------------------  IN:    Albumin 5%  - 250 mL: 250 mL    Amiodarone: 33.3 mL    Fat Emulsion (Fish Oil &amp; Plant Based) 20% Infusion: 145.6 mL    Heparin: 86 mL    Heparin Infusion: 66 mL    IV PiggyBack: 500 mL    IV PiggyBack: 500 mL    TPN (Total Parenteral Nutrition): 1411 mL  Total IN: 2991.9 mL    OUT:    Fat Emulsion (Fish Oil &amp; Plant Based) 20% Infusion: 0 mL    Incontinent per Condom Catheter (mL): 200 mL    Nasogastric/Oral tube (mL): 300 mL    Voided (mL): 950 mL  Total OUT: 1450 mL    Total NET: 1541.9 mL      18 Mar 2023 07:01  -  18 Mar 2023 11:22  --------------------------------------------------------  IN:    Amiodarone: 66.6 mL    Heparin: 26 mL    TPN (Total Parenteral Nutrition): 166 mL  Total IN: 258.6 mL    OUT:  Total OUT: 0 mL    Total NET: 258.6 mL              Neurologic: AAOx4 and moving all extremities.   CV: Normal rate, regular rhythm  Pulmonary: Breathing comfortably  GI: Moderately distended, TTP thorughout without R/G.   Incision: Well approximated with overlying gauze and no erythema/edema/induration. Thin drainage from midpoint.   : Uriarte in place with light yellow fluid in bag.   Extremities: No edema. LLE stump with dressing in place  Skin: Warm, no rashes.  Psychiatric: Notes discomfort but interacts normally otherwise.    LABS:  CBC Full  -  ( 18 Mar 2023 05:45 )  WBC Count : 13.83 K/uL  RBC Count : 3.07 M/uL  Hemoglobin : 9.0 g/dL  Hematocrit : 27.4 %  Platelet Count - Automated : 601 K/uL  Mean Cell Volume : 89.3 fl  Mean Cell Hemoglobin : 29.3 pg  Mean Cell Hemoglobin Concentration : 32.8 gm/dL  Auto Neutrophil # : x  Auto Lymphocyte # : x  Auto Monocyte # : x  Auto Eosinophil # : x  Auto Basophil # : x  Auto Neutrophil % : x  Auto Lymphocyte % : x  Auto Monocyte % : x  Auto Eosinophil % : x  Auto Basophil % : x    03-18    135  |  102  |  14  ----------------------------<  142<H>  4.1   |  22  |  0.38<L>    Ca    7.6<L>      18 Mar 2023 05:45  Phos  3.8     03-18  Mg     1.8     03-18    TPro  5.8<L>  /  Alb  1.9<L>  /  TBili  0.8  /  DBili  x   /  AST  15  /  ALT  20  /  AlkPhos  161<H>  03-18    PT/INR - ( 18 Mar 2023 05:45 )   PT: 15.8 sec;   INR: 1.32          PTT - ( 18 Mar 2023 05:45 )  PTT:45.1 sec

## 2023-03-18 NOTE — PROGRESS NOTE ADULT - CRITICAL CARE ATTENDING COMMENT
Patient seen and examined;  Plans noted for amputation tomorrow
Patient seen and examined;  Plans discussed on rounds  TPN; NGT to suction

## 2023-03-18 NOTE — PROGRESS NOTE ADULT - ASSESSMENT
This is a 77M PMHx of IVDU found unresponsive at his nursing home, resolved after Narcan in the field and brought to [facility]. Found to have PE, atrial flutter, and large LV thrombus on echo. Transferred to [facility] for further management. Pt c/o abdominal pain on 12/10 CTA showing mid SMA with embolus. Abnormal distal small bowel loops and cecum with dilatation and pneumatosis suggesting infarcted bowel. Now s/p Ex lap, SMA embolectomy, 80cm SBR, abthera vac left in discontinuity (12/11). S/p second look (12/13), s/p OR for 3rd look, end-to-end anastomosis of remaining bowel, loop ileostomy and abdomen closure (12/15). S/p LORENZO and Cardioversion on 12/30 with cardiology. Patient was nutritionally optimized. 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 s/p L AKA closure (3/1), s/p ileostomy reversal (3/7) c/b ileus with course c/b upper GI bleeding 3/13 w/ coffee ground emesis (resolved) now with fevers and ileus.    Neuro: Pain: LidoDerm, Tylenol PRN, Dilaudid PRN. Nausea: Zofran PRN.   ENT: Cepacol for sore throat  CV: Tachycardic 2/2 fever. Repeat echo with EF (02/11) 55-60%. Mildly dilated RV. Maintain MAP >65. A.Fib/Flutter: s/p cardioversion on 12/30. Cont metoprolol/ and IV amiodarone; hx HTN: holding spironolactone, Entresto.  Pulm: Comfortable on RA  GI/FEN: s/p ostomy reversal (3/7). Upper GI bleed (resolved): on PPI BID. SBO/ileus: NGT to LIWS, NPO. PICC w TPN. Appetite: Mirtazapine, VitC, MTV, Zinc. Will check lactate with next PTT.   : Voiding, Strict I and O.   Endo: mISS  Heme: LV thrombis? w/ LLE ischemia: Heparin gtt held after GI bleed, HSQ resumed. Active T&S;   ID: Leukocytosis and fevers: LLE ischemia s/p L AKA 2/15; VRE faecium and ESBL Klebsiella BSI (enteric napoleon), tigecycline (3/16--), Caspo (3/14--).  // DC: Zosyn. Vanc (2/10--2/11), Fluconasole (3/9-13), Erta (2/13-3/10; 3/13), Dapto 2/11-3/9; 3/10-16), Tobra (3/13-16), Luis (2/10-12, 3/13-16)  PPX: SCDs, restarting HSQ  L/D/T: PIV  PT/OT: Not ordered  Dispo: SICU

## 2023-03-19 NOTE — PROGRESS NOTE ADULT - ASSESSMENT
77M PMHx of IVDU found unresponsive at his nursing home, resolved after Narcan in the field and brought to [facility]. Found to have PE, atrial flutter, and large LV thrombus on echo. Transferred to [facility] for further management. Pt c/o abdominal pain on 12/10 CTA showing mid SMA with embolus. Abnormal distal small bowel loops and cecum with dilatation and pneumatosis suggesting infarcted bowel. Now s/p Ex lap, SMA embolectomy, 80cm SBR, abthera vac left in discontinuity (12/11). S/p second look (12/13), s/p OR for 3rd look, end-to-end anastomosis of remaining bowel, loop ileostomy and abdomen closure (12/15). S/p LORENZO and Cardioversion on 12/30 with cardiology. Patient was nutritionally optimized. 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 s/p L AKA closure (3/1), s/p ileostomy reversal (3/7) c/b ileus. Course c/b upper GI bleeding 3/13 w/ coffee ground emesis (resolved). In SICU for fevers and leukocytosis.       Neuro: Pain: LidoDerm, Tylenol PRN, Dilaudid PRN. Nausea: Zofran PRN.   ENT: Cepacol for sore throat  CV: Tachycardic 2/2 fever. Repeat echo with EF (02/11) 55-60%. Mildly dilated RV. Maintain MAP >65. A.Fib/Flutter: s/p cardioversion on 12/30. Cont metoprolol/ and IV amiodarone; hx HTN: holding spironolactone, Entresto.  Pulm: Comfortable on RA  GI/FEN: s/p ostomy reversal (3/7). Upper GI bleed (resolved): on PPI BID. SBO/ileus: NGT to LIWS, NPO. PICC w TPN. Appetite: Mirtazapine, VitC, MTV, Zinc. Will check lactate with next PTT.   : Voiding, Strict I and O.   Endo: mISS  Heme: LV thrombis? w/ LLE ischemia: Heparin gtt held after GI bleed, HSQ resumed. Active T&S;   ID: Leukocytosis and fevers: LLE ischemia s/p L AKA 2/15; VRE faecium and ESBL Klebsiella BSI (enteric napoleon), tigecycline (3/16--), Caspo (3/14--).  // DC: Zosyn. Vanc (2/10--2/11), Fluconasole (3/9-13), Erta (2/13-3/10; 3/13), Dapto 2/11-3/9; 3/10-16), Tobra (3/13-16), Luis (2/10-12, 3/13-16)  PPX: SCDs, restarting HSQ  L/D/T: PIV  PT/OT: Not ordered  Dispo: SICU

## 2023-03-19 NOTE — PROGRESS NOTE ADULT - SUBJECTIVE AND OBJECTIVE BOX
STATUS POST: 12/11: Ex lap, SMA embolectomy, 80cm SBR, abthera vac  12/13: Second look, SBR  12/15: Ex-lap, no dead gut, DANIEL of discontinuous gut, loop ileostomy; EBL minimal, 1L crystalloid, UOP 150mL   12/30: LORENZO & Cardioversion on 12/30.   2/15: L AKA guillotine, EBL 10cc, IVF 400cc,   3/1: L AKA closure.   3/7: Ileostomy reversal. EBL 20, ,      SUBJECTIVE: Patient seen and examined bedside by chief resident. Patient reports continued abdominal discomfort and bloating. -F/C/N/V.    aMIOdarone Infusion 1 mG/Min IV Continuous <Continuous>  caspofungin IVPB 50 milliGRAM(s) IV Intermittent every 24 hours  heparin  Infusion 1450 Unit(s)/Hr IV Continuous <Continuous>  metoprolol tartrate Injectable 5 milliGRAM(s) IV Push every 6 hours  tigecycline IVPB      tigecycline IVPB 50 milliGRAM(s) IV Intermittent every 12 hours      Vital Signs Last 24 Hrs  T(C): 36.3 (19 Mar 2023 09:00), Max: 39.1 (18 Mar 2023 17:00)  T(F): 97.4 (19 Mar 2023 09:00), Max: 102.4 (18 Mar 2023 17:00)  HR: 105 (19 Mar 2023 09:00) (97 - 127)  BP: 114/79 (19 Mar 2023 09:00) (93/59 - 158/88)  BP(mean): 86 (19 Mar 2023 09:00) (70 - 113)  RR: 22 (19 Mar 2023 09:00) (15 - 28)  SpO2: 100% (19 Mar 2023 09:00) (92% - 100%)    Parameters below as of 19 Mar 2023 09:00  Patient On (Oxygen Delivery Method): nasal cannula  O2 Flow (L/min): 2    I&O's Detail    18 Mar 2023 07:01  -  19 Mar 2023 07:00  --------------------------------------------------------  IN:    Amiodarone: 483.8 mL    Fat Emulsion (Fish Oil &amp; Plant Based) 20% Infusion: 250 mL    Heparin: 14.5 mL    Heparin: 67.5 mL    Heparin: 70 mL    Heparin: 169 mL    IV PiggyBack: 100 mL    IV PiggyBack: 250 mL    IV PiggyBack: 200 mL    TPN (Total Parenteral Nutrition): 1992 mL  Total IN: 3596.8 mL    OUT:    Emesis (mL): 100 mL    Nasogastric/Oral tube (mL): 80 mL    Voided (mL): 1000 mL  Total OUT: 1180 mL    Total NET: 2416.8 mL      19 Mar 2023 07:01  -  19 Mar 2023 09:49  --------------------------------------------------------  IN:    Heparin: 43.5 mL    TPN (Total Parenteral Nutrition): 249 mL  Total IN: 292.5 mL    OUT:    Amiodarone: 0 mL  Total OUT: 0 mL    Total NET: 292.5 mL          General: NAD, NGT in place draining dark coffee ground fluid.  C/V: NSR  Pulm: Nonlabored breathing, no respiratory distress  Abd: soft, exquisitely TTP/moderately distended, prior ostomy site covered with 4x4, no obvious signs of bleeding or infection  Extrem: s/p L AKA wrapped w/gauze, R LE w/offloading boot        LABS:                        8.2    22.73 )-----------( 626      ( 19 Mar 2023 05:53 )             24.9     03-19    133<L>  |  101  |  17  ----------------------------<  132<H>  4.0   |  24  |  0.45<L>    Ca    7.7<L>      19 Mar 2023 05:53  Phos  4.2     03-19  Mg     2.3     03-19    TPro  5.8<L>  /  Alb  1.9<L>  /  TBili  0.8  /  DBili  x   /  AST  15  /  ALT  20  /  AlkPhos  161<H>  03-18    PT/INR - ( 19 Mar 2023 05:53 )   PT: 17.6 sec;   INR: 1.47          PTT - ( 19 Mar 2023 05:53 )  PTT:58.2 sec      RADIOLOGY & ADDITIONAL STUDIES:

## 2023-03-19 NOTE — PROGRESS NOTE ADULT - SUBJECTIVE AND OBJECTIVE BOX
S: ***    O: ICU Vital Signs Last 24 Hrs  T(F): 97.4 (03-19-23 @ 09:00), Max: 102.4 (03-18-23 @ 17:00)  HR: 105 (03-19-23 @ 09:00) (97 - 127)  BP: 114/79 (03-19-23 @ 09:00) (93/59 - 158/88)  BP(mean): 86 (03-19-23 @ 09:00) (70 - 113)  ABP: --  RR: 22 (03-19-23 @ 09:00) (15 - 28)  SpO2: 100% (03-19-23 @ 09:00) (92% - 100%)    PHYSICAL EXAM:   Neurologic: AAOx4 and moving all extremities.   CV: Normal rate, regular rhythm  Pulmonary: Breathing comfortably  GI: Moderately distended, TTP thorughout without R/G.   Incision: Well approximated with overlying gauze and no erythema/edema/induration. Thin drainage from midpoint.   : Uriarte in place with light yellow fluid in bag.   Extremities: No edema. LLE stump with dressing in place  Skin: Warm, no rashes.  Psychiatric: Notes discomfort but interacts normally otherwise.      LABS:    03-19    133<L>  |  101  |  17  ----------------------------<  132<H>  4.0   |  24  |  0.45<L>    Ca    7.7<L>      19 Mar 2023 05:53  Phos  4.2     03-19  Mg     2.3     03-19    TPro  5.8<L>  /  Alb  1.9<L>  /  TBili  0.8  /  DBili  x   /  AST  15  /  ALT  20  /  AlkPhos  161<H>  03-18  LIVER FUNCTIONS - ( 18 Mar 2023 02:19 )  Alb: 1.9 g/dL / Pro: 5.8 g/dL / ALK PHOS: 161 U/L / ALT: 20 U/L / AST: 15 U/L / GGT: x                               8.2    22.73 )-----------( 626      ( 19 Mar 2023 05:53 )             24.9   PT/INR - ( 19 Mar 2023 05:53 )   PT: 17.6 sec;   INR: 1.47          PTT - ( 19 Mar 2023 05:53 )  PTT:58.2 sec  CAPILLARY BLOOD GLUCOSE      POCT Blood Glucose.: 125 mg/dL (19 Mar 2023 05:41)  POCT Blood Glucose.: 104 mg/dL (18 Mar 2023 23:53)  POCT Blood Glucose.: 108 mg/dL (18 Mar 2023 18:51)  POCT Blood Glucose.: 107 mg/dL (18 Mar 2023 12:18)    MEDICATIONS  (STANDING):  aMIOdarone Infusion 1 mG/Min (33.3 mL/Hr) IV Continuous <Continuous>  ascorbic acid 500 milliGRAM(s) Oral daily  caspofungin IVPB 50 milliGRAM(s) IV Intermittent every 24 hours  chlorhexidine 2% Cloths 1 Application(s) Topical daily  dextrose 5%. 1000 milliLiter(s) (50 mL/Hr) IV Continuous <Continuous>  dextrose 5%. 1000 milliLiter(s) (100 mL/Hr) IV Continuous <Continuous>  dextrose 50% Injectable 25 Gram(s) IV Push once  dextrose 50% Injectable 12.5 Gram(s) IV Push once  dextrose 50% Injectable 25 Gram(s) IV Push once  glucagon  Injectable 1 milliGRAM(s) IntraMuscular once  heparin  Infusion 1450 Unit(s)/Hr (14.5 mL/Hr) IV Continuous <Continuous>  influenza  Vaccine (HIGH DOSE) 0.7 milliLiter(s) IntraMuscular once  insulin lispro (ADMELOG) corrective regimen sliding scale   SubCutaneous every 6 hours  iohexol 300 mG (iodine)/mL Oral Solution 30 milliLiter(s) Oral once  lactated ringers Bolus 500 milliLiter(s) IV Bolus once  metoprolol tartrate Injectable 5 milliGRAM(s) IV Push every 6 hours  mirtazapine 30 milliGRAM(s) Oral at bedtime  multivitamin 1 Tablet(s) Oral daily  nystatin Powder 1 Application(s) Topical two times a day  pantoprazole  Injectable 40 milliGRAM(s) IV Push every 12 hours  Parenteral Nutrition - Adult 1 Each (83 mL/Hr) TPN Continuous <Continuous>  povidone iodine 10% Solution 1 Application(s) Topical daily  sodium chloride 0.9% lock flush 10 milliLiter(s) IV Push every 8 hours  tigecycline IVPB      tigecycline IVPB 50 milliGRAM(s) IV Intermittent every 12 hours  zinc sulfate 220 milliGRAM(s) Oral daily    MEDICATIONS  (PRN):  benzocaine/menthol Lozenge 1 Lozenge Oral every 4 hours PRN Sore Throat  dextrose Oral Gel 15 Gram(s) Oral once PRN Blood Glucose LESS THAN 70 milliGRAM(s)/deciliter  HYDROmorphone  Injectable 0.25 milliGRAM(s) IV Push every 4 hours PRN Severe Pain (7 - 10)  ondansetron Injectable 4 milliGRAM(s) IV Push every 6 hours PRN Nausea and/or Vomiting  sodium chloride 0.9% lock flush 10 milliLiter(s) IV Push every 1 hour PRN Pre/post blood products, medications, blood draw, and to maintain line patency      Uriarte:	  [ ] None	[ ] Daily Uriarte Order Placed	   Indication:	  [ ] Strict I and O's    [ ] Obstruction     [ ] Incontinence + Stage 3 or 4 Decubitus  Central Line:  [ ] None	   [ ]  Medication / TPN Administration     [ ] No Peripheral IV

## 2023-03-19 NOTE — PROGRESS NOTE ADULT - ASSESSMENT
77M PMHx of IVDU found unresponsive at his nursing home, resolved after Narcan in the field and brought to [facility]. Found to have PE, atrial flutter, and large LV thrombus on echo. Transferred to [facility] for further management. Pt c/o abdominal pain on 12/10 CTA showing mid SMA with embolus. Abnormal distal small bowel loops and cecum with dilatation and pneumatosis suggesting infarcted bowel. Now s/p Ex lap, SMA embolectomy, 80cm SBR, abthera vac left in discontinuity (12/11). S/p second look (12/13), s/p OR for 3rd look, end-to-end anastomosis of remaining bowel, loop ileostomy and abdomen closure (12/15). S/p LORENZO and Cardioversion on 12/30 with cardiology. Patient was nutritionally optimized. 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 s/p L AKA closure (3/1), s/p ileostomy reversal (3/7). Patient with noted coffee gorund/dark red drainage from NGT, Hgb found to be 4 and 5, upgraded to SICU for HD monitoring. Passing flatus, NGT output now clear from coffee ground 03/16.    Plan:  CT w/ PO (via NGT) and IV contrast  Continue abx  MARK  NPO/IVF  NGT - LIWS  Care per SICU

## 2023-03-19 NOTE — PROGRESS NOTE ADULT - SUBJECTIVE AND OBJECTIVE BOX
INTERVAL HPI/OVERNIGHT EVENTS: Intermittently, chronically febrile. WBC was down, now up again.    CONSTITUTIONAL:  Negative fever or chills, feels well, good appetite  EYES:  Negative  blurry vision or double vision  CARDIOVASCULAR:  Negative for chest pain or palpitations  RESPIRATORY:  Negative for cough, wheezing, or SOB   GASTROINTESTINAL:  Negative for nausea, vomiting, diarrhea, constipation, or abdominal pain  GENITOURINARY:  Negative frequency, urgency or dysuria  NEUROLOGIC:  No headache, confusion, dizziness, lightheadedness      ANTIBIOTICS/RELEVANT:    MEDICATIONS  (STANDING):  aMIOdarone Infusion 1 mG/Min (33.3 mL/Hr) IV Continuous <Continuous>  ascorbic acid 500 milliGRAM(s) Oral daily  caspofungin IVPB 50 milliGRAM(s) IV Intermittent every 24 hours  chlorhexidine 2% Cloths 1 Application(s) Topical daily  dextrose 5%. 1000 milliLiter(s) (100 mL/Hr) IV Continuous <Continuous>  dextrose 5%. 1000 milliLiter(s) (50 mL/Hr) IV Continuous <Continuous>  dextrose 50% Injectable 25 Gram(s) IV Push once  dextrose 50% Injectable 12.5 Gram(s) IV Push once  dextrose 50% Injectable 25 Gram(s) IV Push once  fat emulsion (Fish Oil and Plant Based) 20% Infusion 0.7764 Gm/kG/Day (20.83 mL/Hr) IV Continuous <Continuous>  glucagon  Injectable 1 milliGRAM(s) IntraMuscular once  heparin  Infusion 1450 Unit(s)/Hr (14.5 mL/Hr) IV Continuous <Continuous>  influenza  Vaccine (HIGH DOSE) 0.7 milliLiter(s) IntraMuscular once  insulin lispro (ADMELOG) corrective regimen sliding scale   SubCutaneous every 6 hours  lactated ringers Bolus 500 milliLiter(s) IV Bolus once  metoprolol tartrate Injectable 5 milliGRAM(s) IV Push every 6 hours  mirtazapine 30 milliGRAM(s) Oral at bedtime  multivitamin 1 Tablet(s) Oral daily  nystatin Powder 1 Application(s) Topical two times a day  pantoprazole  Injectable 40 milliGRAM(s) IV Push every 12 hours  Parenteral Nutrition - Adult 1 Each (83 mL/Hr) TPN Continuous <Continuous>  Parenteral Nutrition - Adult 1 Each (63 mL/Hr) TPN Continuous <Continuous>  povidone iodine 10% Solution 1 Application(s) Topical daily  sodium chloride 0.9% lock flush 10 milliLiter(s) IV Push every 8 hours  tigecycline IVPB      tigecycline IVPB 50 milliGRAM(s) IV Intermittent every 12 hours  zinc sulfate 220 milliGRAM(s) Oral daily    MEDICATIONS  (PRN):  benzocaine/menthol Lozenge 1 Lozenge Oral every 4 hours PRN Sore Throat  dextrose Oral Gel 15 Gram(s) Oral once PRN Blood Glucose LESS THAN 70 milliGRAM(s)/deciliter  HYDROmorphone  Injectable 0.25 milliGRAM(s) IV Push every 4 hours PRN Severe Pain (7 - 10)  ondansetron Injectable 4 milliGRAM(s) IV Push every 6 hours PRN Nausea and/or Vomiting  sodium chloride 0.9% lock flush 10 milliLiter(s) IV Push every 1 hour PRN Pre/post blood products, medications, blood draw, and to maintain line patency        Vital Signs Last 24 Hrs  T(C): 38 (19 Mar 2023 11:00), Max: 39.1 (18 Mar 2023 17:00)  T(F): 100.4 (19 Mar 2023 11:00), Max: 102.4 (18 Mar 2023 17:00)  HR: 105 (19 Mar 2023 15:00) (97 - 127)  BP: 103/67 (19 Mar 2023 15:00) (93/59 - 158/88)  BP(mean): 78 (19 Mar 2023 15:00) (70 - 113)  RR: 19 (19 Mar 2023 15:00) (15 - 28)  SpO2: 100% (19 Mar 2023 15:00) (95% - 100%)    Parameters below as of 19 Mar 2023 15:00  Patient On (Oxygen Delivery Method): nasal cannula  O2 Flow (L/min): 2      PHYSICAL EXAM:  Constitutional: NAD  Eyes: JOHN, EOMI  Ear/Nose/Throat: no oral lesion, no sinus tenderness on percussion	  Neck: no JVD, no lymphadenopathy, supple  Respiratory: CTA keerthi  Cardiovascular: S1S2 RRR, no murmurs  Gastrointestinal:soft, (+) BS, no HSM  Extremities:no e/e/c  Vascular: DP Pulse:	right normal; left normal      LABS:                        8.2    22.73 )-----------( 626      ( 19 Mar 2023 05:53 )             24.9     03-19    133<L>  |  101  |  17  ----------------------------<  132<H>  4.0   |  24  |  0.45<L>    Ca    7.7<L>      19 Mar 2023 05:53  Phos  4.2     03-19  Mg     2.3     03-19    TPro  5.8<L>  /  Alb  1.9<L>  /  TBili  0.8  /  DBili  x   /  AST  15  /  ALT  20  /  AlkPhos  161<H>  03-18    PT/INR - ( 19 Mar 2023 05:53 )   PT: 17.6 sec;   INR: 1.47          PTT - ( 19 Mar 2023 12:22 )  PTT:59.9 sec      MICROBIOLOGY: reviewed    RADIOLOGY & ADDITIONAL STUDIES: reviewed

## 2023-03-19 NOTE — PROGRESS NOTE ADULT - ASSESSMENT
79 yo male with prolonged course c/b mesenteric ischemia s/p SBR and development of RLQ abscess, LLE ischemia s/p L AKA 2/15; VRE faecium and ESBL Klebsiella BSI (enteric napoleon) likely 2/2 intra-abdominal abscess s/p drainage.  Patient afebrile since drainage.  Fluid cultures from OR with Klebsiella suggesting this is the source of the bacteremia. Now culture with Candida albicans and still febrile with leukocytosis. Abdominal fluid collection not drained. Now with GIB at risk for breakthrough bacteremia. Still with intermittent fevers. Fluctuating leukocytosis i/s/o undrained intra-abdominal abscess. Pending CT AP ?results may inform need for intervention. Continues on tigecycline and caspofungin without notable improvement in clinical status. Given prolonged, complicated course (LOS > 101 days) and concern for treatment failure despite several weeks of targeted antibiotic therapy, strongly advise reengagement with palliative care to facilitate GOC--in the absence further definitive surgical intervention for source control, would consider him to be an appropriate candidate for hospice.

## 2023-03-20 NOTE — CHART NOTE - NSCHARTNOTEFT_GEN_A_CORE
Admitting Diagnosis:   Patient is a 78y old  Male who presents with a chief complaint of A flutter (20 Mar 2023 10:13)      PAST MEDICAL & SURGICAL HISTORY:      Current Nutrition Order:  NPO diet with NGT to LIWS  TPN: 275g Dex, 90g AA, 50g 20% SMOF Lipids to provide in total 1795Kcal, 90g protein, GIR 2.98 (based on ABW:65kg), 1.4g/kg ABW protein    PO Intake: Good (%) [   ]  Fair (50-75%) [   ] Poor (<25%) [   ]-- NA NPO/TPN     GI Issues:   Ostomy now reversed; RN reports +Smear o/n  NGT to LIWS: 200ml 3/20, 50ml 3/19, 230ml 3/18   CT 3/19: increased ascites and enlarged peritoneal fluid and gas collections including a large new left upper quadrant fluid collection, concerning for peritonitis and abscesses  Ordered for pantoprazole remeron and zofran     Pain:   None per flow sheets     Skin Integrity:  Buddy: 12  New left AKA as of 2/15  No edema noted  skin tears/wounds noted, please see flow sheets for location   pressure ulcer: thoracic spine/sacral spine stage III        Labs:       137  |  105  |  18  ----------------------------<  143<H>  4.5   |  24  |  0.43<L>    Ca    7.7<L>      20 Mar 2023 05:30  Phos  4.3     03-20  Mg     2.4     03-20      CAPILLARY BLOOD GLUCOSE      POCT Blood Glucose.: 121 mg/dL (20 Mar 2023 11:03)  POCT Blood Glucose.: 136 mg/dL (20 Mar 2023 06:32)  POCT Blood Glucose.: 490 mg/dL (20 Mar 2023 06:29)  POCT Blood Glucose.: 114 mg/dL (19 Mar 2023 23:57)  POCT Blood Glucose.: 113 mg/dL (19 Mar 2023 16:51)      Medications:  MEDICATIONS  (STANDING):  aMIOdarone Infusion 0.5 mG/Min (16.7 mL/Hr) IV Continuous <Continuous>  ascorbic acid 500 milliGRAM(s) Oral daily  caspofungin IVPB 50 milliGRAM(s) IV Intermittent every 24 hours  chlorhexidine 2% Cloths 1 Application(s) Topical daily  dextrose 5%. 1000 milliLiter(s) (50 mL/Hr) IV Continuous <Continuous>  dextrose 5%. 1000 milliLiter(s) (100 mL/Hr) IV Continuous <Continuous>  dextrose 50% Injectable 25 Gram(s) IV Push once  dextrose 50% Injectable 12.5 Gram(s) IV Push once  dextrose 50% Injectable 25 Gram(s) IV Push once  fat emulsion (Fish Oil and Plant Based) 20% Infusion 0.7764 Gm/kG/Day (20.83 mL/Hr) IV Continuous <Continuous>  glucagon  Injectable 1 milliGRAM(s) IntraMuscular once  influenza  Vaccine (HIGH DOSE) 0.7 milliLiter(s) IntraMuscular once  insulin lispro (ADMELOG) corrective regimen sliding scale   SubCutaneous every 6 hours  lactated ringers Bolus 500 milliLiter(s) IV Bolus once  metoprolol tartrate Injectable 5 milliGRAM(s) IV Push every 6 hours  mirtazapine 30 milliGRAM(s) Oral at bedtime  multivitamin 1 Tablet(s) Oral daily  nystatin Powder 1 Application(s) Topical two times a day  pantoprazole  Injectable 40 milliGRAM(s) IV Push every 12 hours  Parenteral Nutrition - Adult 1 Each (63 mL/Hr) TPN Continuous <Continuous>  Parenteral Nutrition - Adult 1 Each (63 mL/Hr) TPN Continuous <Continuous>  povidone iodine 10% Solution 1 Application(s) Topical daily  sodium chloride 0.9% lock flush 10 milliLiter(s) IV Push every 8 hours  tigecycline IVPB      tigecycline IVPB 50 milliGRAM(s) IV Intermittent every 12 hours  zinc sulfate 220 milliGRAM(s) Oral daily    MEDICATIONS  (PRN):  benzocaine/menthol Lozenge 1 Lozenge Oral every 4 hours PRN Sore Throat  dextrose Oral Gel 15 Gram(s) Oral once PRN Blood Glucose LESS THAN 70 milliGRAM(s)/deciliter  HYDROmorphone  Injectable 0.25 milliGRAM(s) IV Push every 4 hours PRN Severe Pain (7 - 10)  ondansetron Injectable 4 milliGRAM(s) IV Push every 6 hours PRN Nausea and/or Vomiting  sodium chloride 0.9% lock flush 10 milliLiter(s) IV Push every 1 hour PRN Pre/post blood products, medications, blood draw, and to maintain line patency                Admitting Anthropometrics:  66  pounds +-10%  Wt 142 pounds BMI 16.4 %IBW=66%   New adjusted IBW (w/ L AKA): 178lbs/ 81.3kg     Weight Change:   2/15 141.7 pounds   141.9 pounds - dosing wt    136 pounds - Bedscale wt   2/15 142lbs    **PCM:  >> Reports having 1-2 meals/day + ONS. Per pt claims to have 2 ensure/day and 2 nutrament/day - unclear how accurate this is. ?If pt meeting ~75% EER consistently.  >> Does report wt loss.  pounds x6 months ago. Thinks he now is 135 pounds which is consistent with bedscale wt obtained during initial visit by  pounds. Suggest wt loss of 49 pounds/26% body wt loss.  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     Estimated energy needs:  ABW used for calculations as pt between % of IBW (80%)  Adjust for age, malnutrition, EF, S/p OR, Pressure ulcer; fluids per team  30-35kcal/k-2275kcal/day   1.4-1.6gm/k-104gm prot/day   **Rec upper end of needs     Subjective:   77M with PMHx of IVDU found unresponsive at his nursing home, resolved after Narcan in the field and brought to [facility]. Found to have PE, atrial flutter, and large LV thrombus on echo. Transferred to [facility] for further management. Pt c/o abdominal pain on 12/10 CTA showing mid SMA with embolus. Abnormal distal small bowel loops and cecum with dilatation and pneumatosis suggesting infarcted bowel. One or two tiny foci of intrahepatic portal vein pneumatosis. Segmental infarction upper pole left kidney. Now s/p Ex lap, SMA embolectomy, 80cm SBR, abthera vac left in discontinuity () and transferred to SICU intubated. S/p second look () and most recently s/p OR for 3rd look, end-to-end anastomosis of remaining bowel, loop ileostomy and abdomen closure (12/15). Transferred to CCU on  for cardiac optimization and now transferred back to SICU  for bleeding hemodynamic instability. Echo  shows EF 10-15% with overall hypokinesis. CTA performed demonstrating large hematoma in R abdomen, new hemoperitoneum, and bilateral pleural effusions. Remains in SICU, now extubated with still with limb ischemia to LLE pending amputation and AC held given BRBPR and hematoma; noted pt agreeable for AKA when feasible - AKA currently Pending Cards. Pt s/p DCCV on  (negative LORENZO on ) with successful conversion to NSR. Planning for AKA with vascular surgery once nutrition status is optimize. Team placed PICC 1/10 and initiated supplemental TPN now weaned off with constant encouragement of PO intake. CT A/P  showing RLQ fluid collection. PPN held 2/ bacteremia. More recently found to have k. 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 PICC placed  now plan to restart TPN. S/p RTOR for L AKA closure 3/1. S/p ileostomy reversal (3/7). Pt with dark output from NGT 3/13 and transfered back to SICU. NG replaced 3/19. NGT output now clear from coffee ground 3/16. Now pending possible Endo eval for ?UGIB,  IR reconsulted for right lower abdominal wall collection per CT Scan and possible drainage today. Possible pallative involvment to facilitate GOC.     On assessment, pt resting in bed. Spoke with RN. Pt currently NPO with NGT to LIWS; Dark OP noted this AM, RN reports ~250ml o/n which seems to be less compared of time of last RD visit. Remains ordered for TPN. Ostomy now reversed; RN reports +Smear o/n whereas pt was not having BM during time of last RD visit.  Labs: POCT POCT 136 490 114, BUN/Cr  18/.43, Na K Mg Phos WDL,  3/15.    Please see RD Recs below, Will Cont to follow, Paged team.      Prior PES: Malnutrition Severe in Chronic state RT presumed intake<EER AEB NFPE, wt loss, PO intake  >>on going  Goal: Pt will meet at least 75% of nutrient needs via feasible route / Show no further s/s of malnutrition    Recommendations:  1. While TPN Remains indicated as medically feasible/appropriate:  >> TPN: 308g Dex, 100g AA, 50g SMOF lipids to provide 1947 kcal, 100g pro, GIR 3.2 (Based on ABW:65kg).   >> Cont to Check TG weekly. Check Mg, Phos, K daily and POC BG Q6hrs. Trend daily weights. Fluids and lytes per MD discretion.   >> Monitor need for Adjustments to insulin with increase in Dex given noted .   2. Monitor for ability to adv Diet Pending GI progression.   3. Monitor Skin, Wt, Labs, GOC.  4. Pain/GI per team.  5. RD to remain available for additional nutrition interventions as needed.     Education:  NA @ this time.     Risk Level: High [ X ] Moderate [   ] Low [   ].

## 2023-03-20 NOTE — PROGRESS NOTE ADULT - ASSESSMENT
77M PMHx of IVDU found unresponsive at his nursing home, resolved after Narcan in the field and brought to [facility]. Found to have PE, atrial flutter, and large LV thrombus on echo. Transferred to [facility] for further management. Pt c/o abdominal pain on 12/10 CTA showing mid SMA with embolus. Abnormal distal small bowel loops and cecum with dilatation and pneumatosis suggesting infarcted bowel. Now s/p Ex lap, SMA embolectomy, 80cm SBR, abthera vac left in discontinuity (12/11). S/p second look (12/13), s/p OR for 3rd look, end-to-end anastomosis of remaining bowel, loop ileostomy and abdomen closure (12/15). S/p LORENZO and Cardioversion on 12/30 with cardiology. Patient was nutritionally optimized. 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 s/p L AKA closure (3/1), s/p ileostomy reversal (3/7) c/b ileus. Course c/b upper GI bleeding 3/13 w/ coffee ground emesis (resolved), sepsis 2/2 intra-abdominal abscesses.     Neuro: Pain: LidoDerm, Tylenol PRN, Dilaudid PRN. Nausea: Zofran PRN.   ENT: Cepacol for sore throat  CV: Euvolemic as of this morning. Sinus tachycardia 2/2 sepsis. Repeat echo with EF (02/11) 55-60%. Mildly dilated RV. Maintain MAP >65. A.Fib/Flutter: s/p cardioversion on 12/30. Cont metoprolol/ and IV amiodarone; hx HTN: holding spironolactone, Entresto.  Pulm: Comfortable on RA  GI/FEN: s/p ostomy reversal (3/7). Upper GI bleed (resolved): on PPI BID. SBO/ileus: NGT to LIWS, NPO. PICC w TPN. Appetite: Mirtazapine, VitC, MTV, Zinc.   : Voiding, Strict I and O.   Endo: mISS  Heme: LV thrombis? w/ LLE ischemia: Heparin gtt held after GI bleed, HSQ resumed. Active T&S;   ID: Sepsis 2/2 intra-abdominal abscesses, worsening leukocytosis. Fever curve improving, afebrile overnight. Plan for IR drainage of intra-abdominal abscesses today. LLE ischemia s/p L AKA 2/15; VRE faecium and ESBL Klebsiella BSI (enteric napoleon), tigecycline (3/16--), Caspo (3/14--).  // DC: Zosyn. Vanc (2/10--2/11), Fluconasole (3/9-13), Erta (2/13-3/10; 3/13), Dapto 2/11-3/9; 3/10-16), Tobra (3/13-16), Luis (2/10-12, 3/13-16)  PPX: SCDs, restarting HSQ  L/D/T: PIV  PT/OT: Not ordered  Dispo: SICU   77M PMHx of IVDU found unresponsive at his nursing home, resolved after Narcan in the field and brought to [facility]. Found to have PE, atrial flutter, and large LV thrombus on echo. Transferred to [facility] for further management. Pt c/o abdominal pain on 12/10 CTA showing mid SMA with embolus. Abnormal distal small bowel loops and cecum with dilatation and pneumatosis suggesting infarcted bowel. Now s/p Ex lap, SMA embolectomy, 80cm SBR, abthera vac left in discontinuity (12/11). S/p second look (12/13), s/p OR for 3rd look, end-to-end anastomosis of remaining bowel, loop ileostomy and abdomen closure (12/15). S/p LORENZO and Cardioversion on 12/30 with cardiology. Patient was nutritionally optimized. 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 s/p L AKA closure (3/1), s/p ileostomy reversal (3/7) c/b ileus. Course c/b upper GI bleeding 3/13 w/ coffee ground emesis (resolved), sepsis 2/2 intra-abdominal abscesses.     Neuro: Pain: LidoDerm, Tylenol PRN, Dilaudid PRN. Nausea: Zofran PRN.   ENT: Cepacol for sore throat  CV: Euvolemic as of this morning. Sinus tachycardia 2/2 sepsis. Repeat echo with EF (02/11) 55-60%. Mildly dilated RV. Maintain MAP >65. A.Fib/Flutter: s/p cardioversion on 12/30. Cont metoprolol/ and IV amiodarone; hx HTN: holding spironolactone, Entresto.  Pulm: Comfortable on RA  GI/FEN: s/p ostomy reversal (3/7). Upper GI bleed (resolved): on PPI BID. SBO/ileus: NGT to LIWS, NPO. PICC w TPN. Appetite: Mirtazapine, VitC, MTV, Zinc.   : Voiding, Strict I and O.   Endo: mISS  Heme: LV thrombus w/ LLE ischemia: Heparin gtt held after GI bleed, resumed (3/17-3/20, currently held for IR procedure). Active T&S  ID: Sepsis 2/2 intra-abdominal abscesses, worsening leukocytosis. Fever curve improving, afebrile overnight. Plan for IR drainage of intra-abdominal abscesses today. LLE ischemia s/p L AKA 2/15; VRE faecium and ESBL Klebsiella BSI (enteric napoleon), tigecycline (3/16--), Caspo (3/14--).  // DC: Zosyn. Vanc (2/10--2/11), Fluconasole (3/9-13), Erta (2/13-3/10; 3/13), Dapto 2/11-3/9; 3/10-16), Tobra (3/13-16), Luis (2/10-12, 3/13-16)  PPX: SCDs, restarting HSQ  L/D/T: PIV  PT/OT: Not ordered  Dispo: SICU

## 2023-03-20 NOTE — PROGRESS NOTE ADULT - SUBJECTIVE AND OBJECTIVE BOX
INFECTIOUS DISEASES CONSULT FOLLOW-UP NOTE    INTERVAL HPI/OVERNIGHT EVENTS: Replaced NGT, position confirmed on CXR. CTAP showed increased ascites, enlarged peritoneal fluid, gas collections, new LUQ fluid collections c/f peritonitis & abscesses. Last febrile yesterday at 6pm Tmax 101F. Denies complaints. ROS negative    ROS:   Constitutional, eyes, ENT, cardiovascular, respiratory, gastrointestinal, genitourinary, integumentary, neurological, psychiatric and heme/lymph are otherwise negative other than noted above     ANTIBIOTICS/RELEVANT:    MEDICATIONS  (STANDING):  aMIOdarone Infusion 0.5 mG/Min (16.7 mL/Hr) IV Continuous <Continuous>  ascorbic acid 500 milliGRAM(s) Oral daily  caspofungin IVPB 50 milliGRAM(s) IV Intermittent every 24 hours  chlorhexidine 2% Cloths 1 Application(s) Topical daily  dextrose 5%. 1000 milliLiter(s) (50 mL/Hr) IV Continuous <Continuous>  dextrose 5%. 1000 milliLiter(s) (100 mL/Hr) IV Continuous <Continuous>  dextrose 50% Injectable 25 Gram(s) IV Push once  dextrose 50% Injectable 12.5 Gram(s) IV Push once  dextrose 50% Injectable 25 Gram(s) IV Push once  fat emulsion (Fish Oil and Plant Based) 20% Infusion 0.7764 Gm/kG/Day (20.83 mL/Hr) IV Continuous <Continuous>  glucagon  Injectable 1 milliGRAM(s) IntraMuscular once  influenza  Vaccine (HIGH DOSE) 0.7 milliLiter(s) IntraMuscular once  insulin lispro (ADMELOG) corrective regimen sliding scale   SubCutaneous every 6 hours  lactated ringers Bolus 500 milliLiter(s) IV Bolus once  metoprolol tartrate Injectable 5 milliGRAM(s) IV Push every 6 hours  mirtazapine 30 milliGRAM(s) Oral at bedtime  multivitamin 1 Tablet(s) Oral daily  nystatin Powder 1 Application(s) Topical two times a day  pantoprazole  Injectable 40 milliGRAM(s) IV Push every 12 hours  Parenteral Nutrition - Adult 1 Each (63 mL/Hr) TPN Continuous <Continuous>  Parenteral Nutrition - Adult 1 Each (63 mL/Hr) TPN Continuous <Continuous>  povidone iodine 10% Solution 1 Application(s) Topical daily  sodium chloride 0.9% lock flush 10 milliLiter(s) IV Push every 8 hours  tigecycline IVPB      tigecycline IVPB 50 milliGRAM(s) IV Intermittent every 12 hours  zinc sulfate 220 milliGRAM(s) Oral daily    MEDICATIONS  (PRN):  benzocaine/menthol Lozenge 1 Lozenge Oral every 4 hours PRN Sore Throat  dextrose Oral Gel 15 Gram(s) Oral once PRN Blood Glucose LESS THAN 70 milliGRAM(s)/deciliter  HYDROmorphone  Injectable 0.25 milliGRAM(s) IV Push every 4 hours PRN Severe Pain (7 - 10)  ondansetron Injectable 4 milliGRAM(s) IV Push every 6 hours PRN Nausea and/or Vomiting  sodium chloride 0.9% lock flush 10 milliLiter(s) IV Push every 1 hour PRN Pre/post blood products, medications, blood draw, and to maintain line patency    Vital Signs Last 24 Hrs  T(C): 38 (20 Mar 2023 14:51), Max: 38.3 (19 Mar 2023 18:03)  T(F): 100.4 (20 Mar 2023 14:51), Max: 101 (19 Mar 2023 18:03)  HR: 114 (20 Mar 2023 16:00) (99 - 130)  BP: 144/77 (20 Mar 2023 16:00) (92/52 - 144/77)  BP(mean): 101 (20 Mar 2023 16:00) (66 - 101)  RR: 19 (20 Mar 2023 16:00) (16 - 25)  SpO2: 93% (20 Mar 2023 16:00) (90% - 99%)    Parameters below as of 20 Mar 2023 16:00  Patient On (Oxygen Delivery Method): room air    03-19-23 @ 07:01  -  03-20-23 @ 07:00  --------------------------------------------------------  IN: 2998.8 mL / OUT: 1350 mL / NET: 1648.8 mL    03-20-23 @ 07:01  -  03-20-23 @ 17:01  --------------------------------------------------------  IN: 1447.6 mL / OUT: 300 mL / NET: 1147.6 mL    PHYSICAL EXAM:  Constitutional: alert, NAD  Eyes: the sclera and conjunctiva were normal.   ENT: the ears and nose were normal in appearance.   Neck: the appearance of the neck was normal and the neck was supple.   Pulmonary: no respiratory distress and lungs were clear to auscultation bilaterally.   Heart: heart rate was normal and rhythm regular, normal S1 and S2  Vascular: Left stump wrapped without strikethrough, WWP, no clubbing/cyanosis/edema  Abdomen: Mildly tense, diffusely tender to palpation (L>R) without guarding or rigidity, ND, midline incision well healing, ostomy reversal site with wet to dry dressing, hypomotile bowel sounds  Neurological: no focal deficits.   Psychiatric: the affect was normal    LABS:                        7.9    26.60 )-----------( 661      ( 20 Mar 2023 05:30 )             24.1     03-20    137  |  105  |  18  ----------------------------<  143<H>  4.5   |  24  |  0.43<L>    Ca    7.7<L>      20 Mar 2023 05:30  Phos  4.3     03-20  Mg     2.4     03-20    PT/INR - ( 20 Mar 2023 05:30 )   PT: 16.4 sec;   INR: 1.37       PTT - ( 20 Mar 2023 05:30 )  PTT:49.8 sec    MICROBIOLOGY:    RADIOLOGY & ADDITIONAL STUDIES:  Reviewed

## 2023-03-20 NOTE — PROGRESS NOTE ADULT - SUBJECTIVE AND OBJECTIVE BOX
ON: CT read "increased ascites and enlarged peritoneal fluid and gas collections including a large new left upper quadrant fluid collection, concerning for peritonitis and abscesses." IR c/s placed. PTT 34. However PTT was off when came back from CT. 9PM PTT repeat 67. NGT pulled back 10 cm. CXR still deep. Pulled back 6 cm.     SUBJECTIVE: Patient seen and examined bedside by SICU team. Patient reports diffuse abdominal discomfort (L>R). Denies fevers, chills, n/v, chest pain, shortness of breath.     aMIOdarone Infusion 0.5 mG/Min IV Continuous <Continuous>  caspofungin IVPB 50 milliGRAM(s) IV Intermittent every 24 hours  metoprolol tartrate Injectable 5 milliGRAM(s) IV Push every 6 hours  tigecycline IVPB      tigecycline IVPB 50 milliGRAM(s) IV Intermittent every 12 hours      Vital Signs Last 24 Hrs  T(C): 37.5 (20 Mar 2023 09:09), Max: 38.3 (19 Mar 2023 18:03)  T(F): 99.5 (20 Mar 2023 09:09), Max: 101 (19 Mar 2023 18:03)  HR: 105 (20 Mar 2023 10:00) (97 - 130)  BP: 114/68 (20 Mar 2023 10:00) (97/64 - 129/67)  BP(mean): 87 (20 Mar 2023 10:00) (73 - 95)  RR: 17 (20 Mar 2023 10:00) (15 - 25)  SpO2: 93% (20 Mar 2023 10:00) (90% - 100%)    Parameters below as of 20 Mar 2023 10:00  Patient On (Oxygen Delivery Method): room air      I&O's Detail    19 Mar 2023 07:01  -  20 Mar 2023 07:00  --------------------------------------------------------  IN:    Fat Emulsion (Fish Oil &amp; Plant Based) 20% Infusion: 270.8 mL    Heparin: 319 mL    IV PiggyBack: 250 mL    IV PiggyBack: 200 mL    Oral Fluid: 50 mL    TPN (Total Parenteral Nutrition): 1909 mL  Total IN: 2998.8 mL    OUT:    Amiodarone: 0 mL    Nasogastric/Oral tube (mL): 250 mL    Voided (mL): 1100 mL  Total OUT: 1350 mL    Total NET: 1648.8 mL      20 Mar 2023 07:01  -  20 Mar 2023 10:13  --------------------------------------------------------  IN:    Amiodarone: 16.7 mL    TPN (Total Parenteral Nutrition): 166 mL  Total IN: 182.7 mL    OUT:    Fat Emulsion (Fish Oil &amp; Plant Based) 20% Infusion: 0 mL    Nasogastric/Oral tube (mL): 50 mL  Total OUT: 50 mL    Total NET: 132.7 mL        Physical Exam:    General: NAD  HEENT: NC/AT, dry mucus membranes. NG tube in place with dark bilious output.  Pulmonary: Nonlabored breathing, no respiratory distress, CTAB  Cardiovascular: tachycardic, no murmurs  Abdominal: Mildly tense, diffusely tender to palpation (L>R) without guarding or rigidity, ND, midline incision well healing, ostomy reversal site with wet to dry dressing  Extremities: Left stump wrapped without strikethrough, WWP, no clubbing/cyanosis/edema  Neuro: A/O x3, CNs II-XII grossly intact,       LABS:                        7.9    26.60 )-----------( 661      ( 20 Mar 2023 05:30 )             24.1     03-20    137  |  105  |  18  ----------------------------<  143<H>  4.5   |  24  |  0.43<L>    Ca    7.7<L>      20 Mar 2023 05:30  Phos  4.3     03-20  Mg     2.4     03-20      PT/INR - ( 20 Mar 2023 05:30 )   PT: 16.4 sec;   INR: 1.37          PTT - ( 20 Mar 2023 05:30 )  PTT:49.8 sec      RADIOLOGY & ADDITIONAL STUDIES:

## 2023-03-20 NOTE — PROGRESS NOTE ADULT - SUBJECTIVE AND OBJECTIVE BOX
ON: CT read "increased ascites and enlarged peritoneal fluid and gas collections including a large new left upper quadrant fluid collection, concerning for peritonitis and abscesses." IR c/s placed. PTT 34. However PTT was off when came back from CT. 9PM PTT repeat 67. NGT pulled back 10 cm. CXR still deep. Pulled back 6 cm.  3/19: NG replaced (no return). CXR-post pyloric, PO contrast given. CT A/P [ ]   ON: NGT low volume --> no bolus. PTT 52 --> Hep gtt to 13.5. PTT 44 --> Hep gtt to 14. AM PTT 58 --> Hep gtt to 14.5. CBC shows WBC 22 and Plt 626.      SUBJECTIVE: Patient seen and examined bedside; awake, but not participating in exam, resting in bed.    aMIOdarone Infusion 1 mG/Min IV Continuous <Continuous>  caspofungin IVPB 50 milliGRAM(s) IV Intermittent every 24 hours  metoprolol tartrate Injectable 5 milliGRAM(s) IV Push every 6 hours  tigecycline IVPB      tigecycline IVPB 50 milliGRAM(s) IV Intermittent every 12 hours      Vital Signs Last 24 Hrs  T(C): 37.3 (20 Mar 2023 00:50), Max: 38.3 (19 Mar 2023 18:03)  T(F): 99.2 (20 Mar 2023 00:50), Max: 101 (19 Mar 2023 18:03)  HR: 105 (20 Mar 2023 05:00) (97 - 130)  BP: 115/68 (20 Mar 2023 05:00) (97/64 - 131/65)  BP(mean): 86 (20 Mar 2023 05:00) (73 - 97)  RR: 17 (20 Mar 2023 05:00) (15 - 27)  SpO2: 94% (20 Mar 2023 05:00) (90% - 100%)    Parameters below as of 20 Mar 2023 05:00  Patient On (Oxygen Delivery Method): nasal cannula  O2 Flow (L/min): 2    I&O's Detail    18 Mar 2023 07:01  -  19 Mar 2023 07:00  --------------------------------------------------------  IN:    Amiodarone: 483.8 mL    Fat Emulsion (Fish Oil &amp; Plant Based) 20% Infusion: 250 mL    Heparin: 67.5 mL    Heparin: 70 mL    Heparin: 169 mL    Heparin: 14.5 mL    IV PiggyBack: 100 mL    IV PiggyBack: 250 mL    IV PiggyBack: 200 mL    TPN (Total Parenteral Nutrition): 1992 mL  Total IN: 3596.8 mL    OUT:    Emesis (mL): 100 mL    Nasogastric/Oral tube (mL): 80 mL    Voided (mL): 1000 mL  Total OUT: 1180 mL    Total NET: 2416.8 mL      19 Mar 2023 07:01  -  20 Mar 2023 06:33  --------------------------------------------------------  IN:    Fat Emulsion (Fish Oil &amp; Plant Based) 20% Infusion: 270.8 mL    Heparin: 319 mL    IV PiggyBack: 100 mL    IV PiggyBack: 250 mL    Oral Fluid: 50 mL    TPN (Total Parenteral Nutrition): 1826 mL  Total IN: 2815.8 mL    OUT:    Amiodarone: 0 mL    Nasogastric/Oral tube (mL): 50 mL    Voided (mL): 350 mL  Total OUT: 400 mL    Total NET: 2415.8 mL      PE:    General: NAD, resting comfortably in bed  HEENT: NGT appropriately positioned, flushed with immediate return of 50cc of clear fluid  C/V: S1 s2, tachycardic  Pulm: Nonlabored breathing, no respiratory distress  Abd: Soft, NTND  Extrem: Left stump wrapped with ace        LABS:                        8.2    22.73 )-----------( 626      ( 19 Mar 2023 05:53 )             24.9     03-19    133<L>  |  101  |  17  ----------------------------<  132<H>  4.0   |  24  |  0.45<L>    Ca    7.7<L>      19 Mar 2023 05:53  Phos  4.2     03-19  Mg     2.3     03-19      PT/INR - ( 19 Mar 2023 05:53 )   PT: 17.6 sec;   INR: 1.47          PTT - ( 19 Mar 2023 20:39 )  PTT:67.6 sec      RADIOLOGY & ADDITIONAL STUDIES:

## 2023-03-20 NOTE — CHART NOTE - NSCHARTNOTEFT_GEN_A_CORE
pt followed by psychotherapy team for supportive therapy pt followed by psychotherapy team for supportive therapy. however, pt was verbally non-responsive and not able to engage in psychotherapy.

## 2023-03-20 NOTE — PROGRESS NOTE ADULT - ASSESSMENT
77M PMHx of IVDU found unresponsive at his nursing home, resolved after Narcan in the field and brought to [facility]. Found to have PE, atrial flutter, and large LV thrombus on echo. Transferred to [facility] for further management. Pt c/o abdominal pain on 12/10 CTA showing mid SMA with embolus. Abnormal distal small bowel loops and cecum with dilatation and pneumatosis suggesting infarcted bowel. Now s/p Ex lap, SMA embolectomy, 80cm SBR, abthera vac left in discontinuity (12/11). S/p second look (12/13), s/p OR for 3rd look, end-to-end anastomosis of remaining bowel, loop ileostomy and abdomen closure (12/15). S/p LORENZO and Cardioversion on 12/30 with cardiology. Patient was nutritionally optimized. 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 s/p L AKA closure (3/1), s/p ileostomy reversal (3/7). Patient with noted coffee gorund/dark red drainage from NGT, Hgb found to be 4 and 5, upgraded to SICU for HD monitoring. Passing flatus, NGT output now clear from coffee ground 03/16. CT A/P 03/19 showing increased abdominal collection over LUQ.    Plan:    NPO  IVF  Hold hep gtt  F/u IR  Cont abx  F/u ID recs  NGT to LIWS  Rest of care per SICU  Surgery Team 4C will continue to follow. Please page Team 4 with questions/clinical changes. [phone]

## 2023-03-20 NOTE — PROGRESS NOTE ADULT - ATTENDING COMMENTS
Patient seen and examined with house-staff during bedside rounds.  Resident note read, including vitals, physical findings, laboratory data, and radiological reports.   Revisions included below.  Direct personal management at bed side and extensive interpretation of the data.  Plan was outlined and discussed in details with the housestaff.  Decision making of high complexity  Action taken for acute disease activity to reflect the level of care provided:  - medication reconciliation  - review laboratory data  The case was discussed with IR.  The patient went for drainage of the abdominal abscess.  3 drains were put in with purulent bed order fluid.  Continue drainage.  Follow-up on the culture.  Continue on the antibiotic.  The pulmonary status is stable at this point.  Palliative care consult.  The stump was examined and no oozing from the stump

## 2023-03-20 NOTE — PROGRESS NOTE ADULT - ASSESSMENT
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  Patient afebrile since drainage.  Fluid cultures from OR with Klebsiella suggesting this is the source of the bacteremia. Now culture with Candida albicans and still febrile with leukocytosis. Abdominal fluid collection not drained. Now with GIB at risk for breakthrough bacteremia. Still with intermittent fevers. CTAP showed increased ascites, enlarged peritoneal fluid, gas collections, new LUQ fluid collections c/f peritonitis & abscesses  - Consider return to OR for source control  - C/w IV Tigecycline 50mg q12hr  - C/w IV Caspofungin 50mg IV daily   - Monitor QTc  - Reengage palliative care    ID team 1 will follow

## 2023-03-21 NOTE — PROGRESS NOTE ADULT - SUBJECTIVE AND OBJECTIVE BOX
INFECTIOUS DISEASES CONSULT FOLLOW-UP NOTE    INTERVAL HPI/OVERNIGHT EVENTS: MAYO    ROS:   Constitutional, eyes, ENT, cardiovascular, respiratory, gastrointestinal, genitourinary, integumentary, neurological, psychiatric and heme/lymph are otherwise negative other than noted above       ANTIBIOTICS/RELEVANT:    MEDICATIONS  (STANDING):  aMIOdarone Infusion 0.5 mG/Min (16.7 mL/Hr) IV Continuous <Continuous>  caspofungin IVPB 50 milliGRAM(s) IV Intermittent every 24 hours  chlorhexidine 2% Cloths 1 Application(s) Topical daily  dextrose 5%. 1000 milliLiter(s) (50 mL/Hr) IV Continuous <Continuous>  dextrose 5%. 1000 milliLiter(s) (100 mL/Hr) IV Continuous <Continuous>  dextrose 50% Injectable 25 Gram(s) IV Push once  dextrose 50% Injectable 12.5 Gram(s) IV Push once  dextrose 50% Injectable 25 Gram(s) IV Push once  fat emulsion (Fish Oil and Plant Based) 20% Infusion 0.7764 Gm/kG/Day (20.83 mL/Hr) IV Continuous <Continuous>  glucagon  Injectable 1 milliGRAM(s) IntraMuscular once  heparin  Infusion 1400 Unit(s)/Hr (14 mL/Hr) IV Continuous <Continuous>  influenza  Vaccine (HIGH DOSE) 0.7 milliLiter(s) IntraMuscular once  insulin lispro (ADMELOG) corrective regimen sliding scale   SubCutaneous every 6 hours  metoprolol tartrate Injectable 5 milliGRAM(s) IV Push every 6 hours  mirtazapine 30 milliGRAM(s) Oral at bedtime  nystatin Powder 1 Application(s) Topical two times a day  octreotide  Injectable 100 MICROGram(s) IV Push every 8 hours  pantoprazole  Injectable 40 milliGRAM(s) IV Push every 12 hours  Parenteral Nutrition - Adult 1 Each (63 mL/Hr) TPN Continuous <Continuous>  Parenteral Nutrition - Adult 1 Each (63 mL/Hr) TPN Continuous <Continuous>  povidone iodine 10% Solution 1 Application(s) Topical daily  sodium chloride 0.9% lock flush 10 milliLiter(s) IV Push every 8 hours  tigecycline IVPB      tigecycline IVPB 50 milliGRAM(s) IV Intermittent every 12 hours  vancomycin  IVPB 1000 milliGRAM(s) IV Intermittent every 12 hours    MEDICATIONS  (PRN):  benzocaine/menthol Lozenge 1 Lozenge Oral every 4 hours PRN Sore Throat  dextrose Oral Gel 15 Gram(s) Oral once PRN Blood Glucose LESS THAN 70 milliGRAM(s)/deciliter  HYDROmorphone  Injectable 0.25 milliGRAM(s) IV Push every 4 hours PRN Severe Pain (7 - 10)  ondansetron Injectable 4 milliGRAM(s) IV Push every 6 hours PRN Nausea and/or Vomiting  sodium chloride 0.9% lock flush 10 milliLiter(s) IV Push every 1 hour PRN Pre/post blood products, medications, blood draw, and to maintain line patency        Vital Signs Last 24 Hrs  T(C): 38 (21 Mar 2023 08:00), Max: 38.6 (20 Mar 2023 23:00)  T(F): 100.4 (21 Mar 2023 08:00), Max: 101.5 (20 Mar 2023 23:00)  HR: 98 (21 Mar 2023 12:00) (93 - 122)  BP: 120/66 (21 Mar 2023 12:00) (100/59 - 158/76)  BP(mean): 88 (21 Mar 2023 12:00) (72 - 109)  RR: 20 (21 Mar 2023 12:00) (14 - 24)  SpO2: 95% (21 Mar 2023 12:00) (91% - 100%)    Parameters below as of 21 Mar 2023 12:00  Patient On (Oxygen Delivery Method): room air        03-20-23 @ 07:01  -  03-21-23 @ 07:00  --------------------------------------------------------  IN: 3879.8 mL / OUT: 2660 mL / NET: 1219.8 mL    03-21-23 @ 07:01  -  03-21-23 @ 12:50  --------------------------------------------------------  IN: 440.5 mL / OUT: 770 mL / NET: -329.5 mL      PHYSICAL EXAM:  Constitutional: alert, NAD  Eyes: the sclera and conjunctiva were normal.   ENT: the ears and nose were normal in appearance.   Neck: the appearance of the neck was normal and the neck was supple.   Pulmonary: no respiratory distress and lungs were clear to auscultation bilaterally.   Heart: heart rate was normal and rhythm regular, normal S1 and S2  Vascular:. there was no peripheral edema  Abdomen: normal bowel sounds, soft, non-tender  Neurological: no focal deficits.   Psychiatric: the affect was normal        LABS:                        7.4    21.26 )-----------( 590      ( 21 Mar 2023 06:06 )             22.6     03-21    136  |  104  |  15  ----------------------------<  151<H>  4.6   |  22  |  0.48<L>    Ca    7.7<L>      21 Mar 2023 06:06  Phos  4.8     03-21  Mg     2.4     03-21      PT/INR - ( 21 Mar 2023 06:06 )   PT: 17.0 sec;   INR: 1.42          PTT - ( 21 Mar 2023 06:06 )  PTT:33.2 sec      MICROBIOLOGY:      RADIOLOGY & ADDITIONAL STUDIES:  Reviewed INFECTIOUS DISEASES CONSULT FOLLOW-UP NOTE    INTERVAL HPI/OVERNIGHT EVENTS: MAYO    ROS:   Constitutional, eyes, ENT, cardiovascular, respiratory, gastrointestinal, genitourinary, integumentary, neurological, psychiatric and heme/lymph are otherwise negative other than noted above       ANTIBIOTICS/RELEVANT:    MEDICATIONS  (STANDING):  aMIOdarone Infusion 0.5 mG/Min (16.7 mL/Hr) IV Continuous <Continuous>  caspofungin IVPB 50 milliGRAM(s) IV Intermittent every 24 hours  chlorhexidine 2% Cloths 1 Application(s) Topical daily  dextrose 5%. 1000 milliLiter(s) (50 mL/Hr) IV Continuous <Continuous>  dextrose 5%. 1000 milliLiter(s) (100 mL/Hr) IV Continuous <Continuous>  dextrose 50% Injectable 25 Gram(s) IV Push once  dextrose 50% Injectable 12.5 Gram(s) IV Push once  dextrose 50% Injectable 25 Gram(s) IV Push once  fat emulsion (Fish Oil and Plant Based) 20% Infusion 0.7764 Gm/kG/Day (20.83 mL/Hr) IV Continuous <Continuous>  glucagon  Injectable 1 milliGRAM(s) IntraMuscular once  heparin  Infusion 1400 Unit(s)/Hr (14 mL/Hr) IV Continuous <Continuous>  influenza  Vaccine (HIGH DOSE) 0.7 milliLiter(s) IntraMuscular once  insulin lispro (ADMELOG) corrective regimen sliding scale   SubCutaneous every 6 hours  metoprolol tartrate Injectable 5 milliGRAM(s) IV Push every 6 hours  mirtazapine 30 milliGRAM(s) Oral at bedtime  nystatin Powder 1 Application(s) Topical two times a day  octreotide  Injectable 100 MICROGram(s) IV Push every 8 hours  pantoprazole  Injectable 40 milliGRAM(s) IV Push every 12 hours  Parenteral Nutrition - Adult 1 Each (63 mL/Hr) TPN Continuous <Continuous>  Parenteral Nutrition - Adult 1 Each (63 mL/Hr) TPN Continuous <Continuous>  povidone iodine 10% Solution 1 Application(s) Topical daily  sodium chloride 0.9% lock flush 10 milliLiter(s) IV Push every 8 hours  tigecycline IVPB      tigecycline IVPB 50 milliGRAM(s) IV Intermittent every 12 hours  vancomycin  IVPB 1000 milliGRAM(s) IV Intermittent every 12 hours    MEDICATIONS  (PRN):  benzocaine/menthol Lozenge 1 Lozenge Oral every 4 hours PRN Sore Throat  dextrose Oral Gel 15 Gram(s) Oral once PRN Blood Glucose LESS THAN 70 milliGRAM(s)/deciliter  HYDROmorphone  Injectable 0.25 milliGRAM(s) IV Push every 4 hours PRN Severe Pain (7 - 10)  ondansetron Injectable 4 milliGRAM(s) IV Push every 6 hours PRN Nausea and/or Vomiting  sodium chloride 0.9% lock flush 10 milliLiter(s) IV Push every 1 hour PRN Pre/post blood products, medications, blood draw, and to maintain line patency        Vital Signs Last 24 Hrs  T(C): 38 (21 Mar 2023 08:00), Max: 38.6 (20 Mar 2023 23:00)  T(F): 100.4 (21 Mar 2023 08:00), Max: 101.5 (20 Mar 2023 23:00)  HR: 98 (21 Mar 2023 12:00) (93 - 122)  BP: 120/66 (21 Mar 2023 12:00) (100/59 - 158/76)  BP(mean): 88 (21 Mar 2023 12:00) (72 - 109)  RR: 20 (21 Mar 2023 12:00) (14 - 24)  SpO2: 95% (21 Mar 2023 12:00) (91% - 100%)    Parameters below as of 21 Mar 2023 12:00  Patient On (Oxygen Delivery Method): room air        03-20-23 @ 07:01  -  03-21-23 @ 07:00  --------------------------------------------------------  IN: 3879.8 mL / OUT: 2660 mL / NET: 1219.8 mL    03-21-23 @ 07:01  -  03-21-23 @ 12:50  --------------------------------------------------------  IN: 440.5 mL / OUT: 770 mL / NET: -329.5 mL      PHYSICAL EXAM:  Constitutional: alert, NAD  Eyes: the sclera and conjunctiva were normal.   ENT: the ears and nose were normal in appearance.   Neck: the appearance of the neck was normal and the neck was supple.   Pulmonary: no respiratory distress and lungs were clear to auscultation bilaterally.   Heart: heart rate was normal and rhythm regular, normal S1 and S2  Vascular: left stump wrapped without strikethrough, WWP, no clubbing/cyanosis/edema  Abdomen: diffusely tender to palpation (L>R) without guarding or rigidity, LUQ drains in place, LLQ feculent and malodorous  Neurological: no focal deficits.   Psychiatric: the affect was normal        LABS:                        7.4    21.26 )-----------( 590      ( 21 Mar 2023 06:06 )             22.6     03-21    136  |  104  |  15  ----------------------------<  151<H>  4.6   |  22  |  0.48<L>    Ca    7.7<L>      21 Mar 2023 06:06  Phos  4.8     03-21  Mg     2.4     03-21      PT/INR - ( 21 Mar 2023 06:06 )   PT: 17.0 sec;   INR: 1.42          PTT - ( 21 Mar 2023 06:06 )  PTT:33.2 sec      MICROBIOLOGY:      RADIOLOGY & ADDITIONAL STUDIES:  Reviewed

## 2023-03-21 NOTE — PROGRESS NOTE ADULT - ASSESSMENT
77M PMHx of IVDU found unresponsive at his nursing home, resolved after Narcan in the field and brought to [facility]. Found to have PE, atrial flutter, and large LV thrombus on echo. Transferred to [facility] for further management. Pt c/o abdominal pain on 12/10 CTA showing mid SMA with embolus. Abnormal distal small bowel loops and cecum with dilatation and pneumatosis suggesting infarcted bowel. Now s/p Ex lap, SMA embolectomy, 80cm SBR, abthera vac left in discontinuity (12/11). S/p second look (12/13), s/p OR for 3rd look, end-to-end anastomosis of remaining bowel, loop ileostomy and abdomen closure (12/15). S/p LORENZO and Cardioversion on 12/30 with cardiology. Patient was nutritionally optimized. 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 s/p L AKA closure (3/1), s/p ileostomy reversal (3/7). Patient with noted coffee gorund/dark red drainage from NGT, Hgb found to be 4 and 5, upgraded to SICU for HD monitoring. Passing flatus, NGT output now clear from coffee ground 03/16. CT A/P 03/19 showing increased abdominal collection over LUQ.    Plan:    NPO  IVF  NGT to LIWS  Monitor drain output  Cont abx  F/u ID recs  Rest of care per SICU  Surgery Team 4C will continue to follow. Please page Team 4 with questions/clinical changes. [phone]

## 2023-03-21 NOTE — PROGRESS NOTE ADULT - SUBJECTIVE AND OBJECTIVE BOX
ON: added on vanc per ID, productive cough of clear mucus, CXR clear, LLQ purulent, LUQ drains bilious, temp 101.4, ofirmev x2, weaned NC  3/20: reordered TPN, WBC increasing, holding hep gtt for IR, restarted amio @0.5, IR placed 3 drain - 2 in LUQ (200cc and 150cc slightly blood) and 1 in the pelvis - 400cc (purulent/bilious?), BP soft- gave album 5% 250. Repeat BCx sent.      SUBJECTIVE: Patient seen and examined bedside by chief resident.    aMIOdarone Infusion 0.5 mG/Min IV Continuous <Continuous>  caspofungin IVPB 50 milliGRAM(s) IV Intermittent every 24 hours  metoprolol tartrate Injectable 5 milliGRAM(s) IV Push every 6 hours  tigecycline IVPB      tigecycline IVPB 50 milliGRAM(s) IV Intermittent every 12 hours  vancomycin  IVPB 1000 milliGRAM(s) IV Intermittent every 12 hours      Vital Signs Last 24 Hrs  T(C): 38.4 (21 Mar 2023 06:05), Max: 38.6 (20 Mar 2023 23:00)  T(F): 101.1 (21 Mar 2023 06:05), Max: 101.5 (20 Mar 2023 23:00)  HR: 106 (21 Mar 2023 06:00) (99 - 122)  BP: 112/66 (21 Mar 2023 06:00) (92/52 - 158/76)  BP(mean): 83 (21 Mar 2023 06:00) (66 - 109)  RR: 23 (21 Mar 2023 06:00) (14 - 24)  SpO2: 94% (21 Mar 2023 06:00) (91% - 100%)    Parameters below as of 21 Mar 2023 06:00  Patient On (Oxygen Delivery Method): room air      I&O's Detail    19 Mar 2023 07:01  -  20 Mar 2023 07:00  --------------------------------------------------------  IN:    Fat Emulsion (Fish Oil &amp; Plant Based) 20% Infusion: 270.8 mL    Heparin: 319 mL    IV PiggyBack: 200 mL    IV PiggyBack: 250 mL    Oral Fluid: 50 mL    TPN (Total Parenteral Nutrition): 1909 mL  Total IN: 2998.8 mL    OUT:    Amiodarone: 0 mL    Nasogastric/Oral tube (mL): 250 mL    Voided (mL): 1100 mL  Total OUT: 1350 mL    Total NET: 1648.8 mL      20 Mar 2023 07:01  -  21 Mar 2023 06:34  --------------------------------------------------------  IN:    Albumin 5%  - 250 mL: 250 mL    Amiodarone: 367.4 mL    Fat Emulsion (Fish Oil &amp; Plant Based) 20% Infusion: 249.7 mL    IV PiggyBack: 500 mL    IV PiggyBack: 250 mL    IV PiggyBack: 350 mL    TPN (Total Parenteral Nutrition): 1483 mL  Total IN: 3450.1 mL    OUT:    Bulb (mL): 580 mL    Drain (mL): 20 mL    Drain (mL): 410 mL    Fat Emulsion (Fish Oil &amp; Plant Based) 20% Infusion: 0 mL    Incontinent per Condom Catheter (mL): 700 mL    Nasogastric/Oral tube (mL): 350 mL  Total OUT: 2060 mL    Total NET: 1390.1 mL          General: NAD, resting comfortably in bed  C/V: NSR  Pulm: Nonlabored breathing, no respiratory distress  Abd: soft, NT/ND.  Extrem: WWP, no edema, SCDs in place        LABS:                        7.4    21.26 )-----------( 590      ( 21 Mar 2023 06:06 )             22.6     03-21    136  |  104  |  15  ----------------------------<  151<H>  4.6   |  22  |  x     Ca    7.7<L>      20 Mar 2023 05:30  Phos  4.3     03-20  Mg     2.4     03-21      PT/INR - ( 21 Mar 2023 06:06 )   PT: 17.0 sec;   INR: 1.42          PTT - ( 21 Mar 2023 06:06 )  PTT:33.2 sec      RADIOLOGY & ADDITIONAL STUDIES:   ON: added on vanc per ID, productive cough of clear mucus, CXR clear, LLQ purulent, LUQ drains bilious, temp 101.4, ofirmev x2, weaned NC  3/20: reordered TPN, WBC increasing, holding hep gtt for IR, restarted amio @0.5, IR placed 3 drain - 2 in LUQ (200cc and 150cc slightly blood) and 1 in the pelvis - 400cc (purulent/bilious?), BP soft- gave album 5% 250. Repeat BCx sent.      SUBJECTIVE: Patient seen and examined bedside; sleepy but arousable, feeling tired this am, but otherwise denies abdominal pain or pain around drains.    aMIOdarone Infusion 0.5 mG/Min IV Continuous <Continuous>  caspofungin IVPB 50 milliGRAM(s) IV Intermittent every 24 hours  metoprolol tartrate Injectable 5 milliGRAM(s) IV Push every 6 hours  tigecycline IVPB      tigecycline IVPB 50 milliGRAM(s) IV Intermittent every 12 hours  vancomycin  IVPB 1000 milliGRAM(s) IV Intermittent every 12 hours      Vital Signs Last 24 Hrs  T(C): 38.4 (21 Mar 2023 06:05), Max: 38.6 (20 Mar 2023 23:00)  T(F): 101.1 (21 Mar 2023 06:05), Max: 101.5 (20 Mar 2023 23:00)  HR: 106 (21 Mar 2023 06:00) (99 - 122)  BP: 112/66 (21 Mar 2023 06:00) (92/52 - 158/76)  BP(mean): 83 (21 Mar 2023 06:00) (66 - 109)  RR: 23 (21 Mar 2023 06:00) (14 - 24)  SpO2: 94% (21 Mar 2023 06:00) (91% - 100%)    Parameters below as of 21 Mar 2023 06:00  Patient On (Oxygen Delivery Method): room air      I&O's Detail    19 Mar 2023 07:01  -  20 Mar 2023 07:00  --------------------------------------------------------  IN:    Fat Emulsion (Fish Oil &amp; Plant Based) 20% Infusion: 270.8 mL    Heparin: 319 mL    IV PiggyBack: 200 mL    IV PiggyBack: 250 mL    Oral Fluid: 50 mL    TPN (Total Parenteral Nutrition): 1909 mL  Total IN: 2998.8 mL    OUT:    Amiodarone: 0 mL    Nasogastric/Oral tube (mL): 250 mL    Voided (mL): 1100 mL  Total OUT: 1350 mL    Total NET: 1648.8 mL      20 Mar 2023 07:01  -  21 Mar 2023 06:34  --------------------------------------------------------  IN:    Albumin 5%  - 250 mL: 250 mL    Amiodarone: 367.4 mL    Fat Emulsion (Fish Oil &amp; Plant Based) 20% Infusion: 249.7 mL    IV PiggyBack: 500 mL    IV PiggyBack: 250 mL    IV PiggyBack: 350 mL    TPN (Total Parenteral Nutrition): 1483 mL  Total IN: 3450.1 mL    OUT:    Bulb (mL): 580 mL    Drain (mL): 20 mL    Drain (mL): 410 mL    Fat Emulsion (Fish Oil &amp; Plant Based) 20% Infusion: 0 mL    Incontinent per Condom Catheter (mL): 700 mL    Nasogastric/Oral tube (mL): 350 mL  Total OUT: 2060 mL    Total NET: 1390.1 mL      PE:    General: NAD, resting comfortably in bed  HEENT: NGT appropriately positioned, draining clear brown fluid  C/V: S1 s2, NSR, tachycardic  Pulm: Nonlabored breathing, no respiratory distress  Abd: Soft, NTND, LUQ drains serous +/- bilious, pelvic OLGA purulent  Extrem: Left stump wrapped with ace        LABS:                        7.4    21.26 )-----------( 590      ( 21 Mar 2023 06:06 )             22.6     03-21    136  |  104  |  15  ----------------------------<  151<H>  4.6   |  22  |  x     Ca    7.7<L>      20 Mar 2023 05:30  Phos  4.3     03-20  Mg     2.4     03-21      PT/INR - ( 21 Mar 2023 06:06 )   PT: 17.0 sec;   INR: 1.42          PTT - ( 21 Mar 2023 06:06 )  PTT:33.2 sec      RADIOLOGY & ADDITIONAL STUDIES:

## 2023-03-21 NOTE — PROGRESS NOTE ADULT - ATTENDING COMMENTS
Patient seen and examined with house-staff during bedside rounds.  Resident note read, including vitals, physical findings, laboratory data, and radiological reports.   Revisions included below.  Direct personal management at bed side and extensive interpretation of the data.  Plan was outlined and discussed in details with the housestaff.  Decision making of high complexity  Action taken for acute disease activity to reflect the level of care provided:  - medication reconciliation  - review laboratory data  Events from overnight was reviewed.  The patient is draining purulent secretion from the pelvic drain with bad odor.  The patient is hemodynamically stable.  The white count decreased.  The most likely there is communication with the bowel.  Continue antibiotic.  Vancomycin was added.  Follow-up on the culture.  Follow-up with palliative medicine.  Will discuss with IR restarting anticoagulation.  The oxygen saturation is stable.  Continue TPN

## 2023-03-21 NOTE — PROGRESS NOTE ADULT - CONVERSATION DETAILS
Pt and family well known to this writer. We discussed recent developments. Pt spiking fevers. CTAP showed increased ascites, enlarged peritoneal fluid, gas collections, new LUQ fluid collections c/f peritonitis & abscesses. He then underwent IR procedure with multiple catheter placement. We discussed plans to monitor WBC and fever curve in the coming days. Expressed concern that if there is no clinical improvement, further abx may be futile. He is not a candidate to return to the OR at this time. Pt and Sophie remain hopeful, but also understand that without antimicrobial treatment of his infection, pt would be approaching the end of his life. Questions answered, support provided.

## 2023-03-21 NOTE — PROGRESS NOTE ADULT - SUBJECTIVE AND OBJECTIVE BOX
ON: IR placed 3 drain - 2 in LUQ (200cc and 150cc slightly blood) and 1 in the pelvis - 400cc (purulent/bilious?). Added on vanc per ID, productive cough of clear mucus, CXR clear, LLQ purulent, LUQ drains bilious, temp 101.4, ofirmev x2, weaned NC     SUBJECTIVE: Patient seen and examined bedside by SICU team.     aMIOdarone Infusion 0.5 mG/Min IV Continuous <Continuous>  caspofungin IVPB 50 milliGRAM(s) IV Intermittent every 24 hours  metoprolol tartrate Injectable 5 milliGRAM(s) IV Push every 6 hours  tigecycline IVPB      tigecycline IVPB 50 milliGRAM(s) IV Intermittent every 12 hours  vancomycin  IVPB 1000 milliGRAM(s) IV Intermittent every 12 hours      Vital Signs Last 24 Hrs  T(C): 38.4 (21 Mar 2023 06:05), Max: 38.6 (20 Mar 2023 23:00)  T(F): 101.1 (21 Mar 2023 06:05), Max: 101.5 (20 Mar 2023 23:00)  HR: 106 (21 Mar 2023 06:00) (99 - 122)  BP: 112/66 (21 Mar 2023 06:00) (92/52 - 158/76)  BP(mean): 83 (21 Mar 2023 06:00) (66 - 109)  RR: 23 (21 Mar 2023 06:00) (14 - 24)  SpO2: 94% (21 Mar 2023 06:00) (91% - 100%)    Parameters below as of 21 Mar 2023 06:00  Patient On (Oxygen Delivery Method): room air      I&O's Detail    19 Mar 2023 07:01  -  20 Mar 2023 07:00  --------------------------------------------------------  IN:    Fat Emulsion (Fish Oil &amp; Plant Based) 20% Infusion: 270.8 mL    Heparin: 319 mL    IV PiggyBack: 200 mL    IV PiggyBack: 250 mL    Oral Fluid: 50 mL    TPN (Total Parenteral Nutrition): 1909 mL  Total IN: 2998.8 mL    OUT:    Amiodarone: 0 mL    Nasogastric/Oral tube (mL): 250 mL    Voided (mL): 1100 mL  Total OUT: 1350 mL    Total NET: 1648.8 mL      20 Mar 2023 07:01  -  21 Mar 2023 06:28  --------------------------------------------------------  IN:    Albumin 5%  - 250 mL: 250 mL    Amiodarone: 367.4 mL    Fat Emulsion (Fish Oil &amp; Plant Based) 20% Infusion: 249.7 mL    IV PiggyBack: 500 mL    IV PiggyBack: 250 mL    IV PiggyBack: 350 mL    TPN (Total Parenteral Nutrition): 1483 mL  Total IN: 3450.1 mL    OUT:    Bulb (mL): 580 mL    Drain (mL): 20 mL    Drain (mL): 410 mL    Fat Emulsion (Fish Oil &amp; Plant Based) 20% Infusion: 0 mL    Incontinent per Condom Catheter (mL): 700 mL    Nasogastric/Oral tube (mL): 350 mL  Total OUT: 2060 mL    Total NET: 1390.1 mL      Physical Exam:    General: NAD  HEENT: NC/AT, dry mucus membranes. NG tube in place with dark bilious output.  Pulmonary: Nonlabored breathing, no respiratory distress, CTAB  Cardiovascular: tachycardic, no murmurs  Abdominal: Mildly tense, diffusely tender to palpation (L>R) without guarding or rigidity, ND, midline incision well healing, ostomy reversal site with wet to dry dressing. LUQ OLGA x2 draining bilious, LLQ OLGA draining purulent.  Extremities: Left stump wrapped without strikethrough, WWP, no clubbing/cyanosis/edema  Neuro: A/O x3, CNs II-XII grossly intact,       LABS:                        7.4    21.26 )-----------( 590      ( 21 Mar 2023 06:06 )             22.6     03-20    137  |  105  |  18  ----------------------------<  143<H>  4.5   |  24  |  0.43<L>    Ca    7.7<L>      20 Mar 2023 05:30  Phos  4.3     03-20  Mg     2.4     03-20      PT/INR - ( 20 Mar 2023 05:30 )   PT: 16.4 sec;   INR: 1.37          PTT - ( 20 Mar 2023 05:30 )  PTT:49.8 sec      RADIOLOGY & ADDITIONAL STUDIES:   ON: IR placed 3 drain - 2 in LUQ (200cc and 150cc slightly blood) and 1 in the pelvis - 400cc (purulent/bilious?). Added on vanc per ID, productive cough of clear mucus, CXR clear, LLQ purulent, LUQ drains bilious, temp 101.4, ofirmev x2, weaned NC     SUBJECTIVE: Patient seen and examined bedside by SICU team.     aMIOdarone Infusion 0.5 mG/Min IV Continuous <Continuous>  caspofungin IVPB 50 milliGRAM(s) IV Intermittent every 24 hours  metoprolol tartrate Injectable 5 milliGRAM(s) IV Push every 6 hours  tigecycline IVPB      tigecycline IVPB 50 milliGRAM(s) IV Intermittent every 12 hours  vancomycin  IVPB 1000 milliGRAM(s) IV Intermittent every 12 hours      Vital Signs Last 24 Hrs  T(C): 38.4 (21 Mar 2023 06:05), Max: 38.6 (20 Mar 2023 23:00)  T(F): 101.1 (21 Mar 2023 06:05), Max: 101.5 (20 Mar 2023 23:00)  HR: 106 (21 Mar 2023 06:00) (99 - 122)  BP: 112/66 (21 Mar 2023 06:00) (92/52 - 158/76)  BP(mean): 83 (21 Mar 2023 06:00) (66 - 109)  RR: 23 (21 Mar 2023 06:00) (14 - 24)  SpO2: 94% (21 Mar 2023 06:00) (91% - 100%)    Parameters below as of 21 Mar 2023 06:00  Patient On (Oxygen Delivery Method): room air      I&O's Detail    19 Mar 2023 07:01  -  20 Mar 2023 07:00  --------------------------------------------------------  IN:    Fat Emulsion (Fish Oil &amp; Plant Based) 20% Infusion: 270.8 mL    Heparin: 319 mL    IV PiggyBack: 200 mL    IV PiggyBack: 250 mL    Oral Fluid: 50 mL    TPN (Total Parenteral Nutrition): 1909 mL  Total IN: 2998.8 mL    OUT:    Amiodarone: 0 mL    Nasogastric/Oral tube (mL): 250 mL    Voided (mL): 1100 mL  Total OUT: 1350 mL    Total NET: 1648.8 mL      20 Mar 2023 07:01  -  21 Mar 2023 06:28  --------------------------------------------------------  IN:    Albumin 5%  - 250 mL: 250 mL    Amiodarone: 367.4 mL    Fat Emulsion (Fish Oil &amp; Plant Based) 20% Infusion: 249.7 mL    IV PiggyBack: 500 mL    IV PiggyBack: 250 mL    IV PiggyBack: 350 mL    TPN (Total Parenteral Nutrition): 1483 mL  Total IN: 3450.1 mL    OUT:    Bulb (mL): 580 mL    Drain (mL): 20 mL    Drain (mL): 410 mL    Fat Emulsion (Fish Oil &amp; Plant Based) 20% Infusion: 0 mL    Incontinent per Condom Catheter (mL): 700 mL    Nasogastric/Oral tube (mL): 350 mL  Total OUT: 2060 mL    Total NET: 1390.1 mL      Physical Exam:    General: NAD  HEENT: NC/AT, dry mucus membranes. NG tube in place with dark bilious output.  Pulmonary: Nonlabored breathing, no respiratory distress, CTAB  Cardiovascular: tachycardic, no murmurs  Abdominal: Mildly tense, diffusely tender to palpation (L>R) without guarding or rigidity, ND, midline incision well healing, ostomy reversal site with wet to dry dressing. LUQ drains x2 bilious, LLQ drain feculent  Extremities: Left stump wrapped without strikethrough, WWP, no clubbing/cyanosis/edema  Neuro: A/O x3, CNs II-XII grossly intact,       LABS:                        7.4    21.26 )-----------( 590      ( 21 Mar 2023 06:06 )             22.6     03-20    137  |  105  |  18  ----------------------------<  143<H>  4.5   |  24  |  0.43<L>    Ca    7.7<L>      20 Mar 2023 05:30  Phos  4.3     03-20  Mg     2.4     03-20      PT/INR - ( 20 Mar 2023 05:30 )   PT: 16.4 sec;   INR: 1.37          PTT - ( 20 Mar 2023 05:30 )  PTT:49.8 sec      RADIOLOGY & ADDITIONAL STUDIES:   ON: IR placed 3 drain - 2 in LUQ (200cc and 150cc slightly blood) and 1 in the pelvis - 400cc (purulent/bilious?). Added on vanc per ID, productive cough of clear mucus, CXR clear, LLQ purulent, LUQ drains bilious, temp 101.4, ofirmev x2, weaned NC     SUBJECTIVE: Patient seen and examined bedside by SICU team. Patient reports pain is controlled, denies any other acute complaints this morning. -F/C/CP/SOB/N/V    aMIOdarone Infusion 0.5 mG/Min IV Continuous <Continuous>  caspofungin IVPB 50 milliGRAM(s) IV Intermittent every 24 hours  metoprolol tartrate Injectable 5 milliGRAM(s) IV Push every 6 hours  tigecycline IVPB      tigecycline IVPB 50 milliGRAM(s) IV Intermittent every 12 hours  vancomycin  IVPB 1000 milliGRAM(s) IV Intermittent every 12 hours      Vital Signs Last 24 Hrs  T(C): 38.4 (21 Mar 2023 06:05), Max: 38.6 (20 Mar 2023 23:00)  T(F): 101.1 (21 Mar 2023 06:05), Max: 101.5 (20 Mar 2023 23:00)  HR: 106 (21 Mar 2023 06:00) (99 - 122)  BP: 112/66 (21 Mar 2023 06:00) (92/52 - 158/76)  BP(mean): 83 (21 Mar 2023 06:00) (66 - 109)  RR: 23 (21 Mar 2023 06:00) (14 - 24)  SpO2: 94% (21 Mar 2023 06:00) (91% - 100%)    Parameters below as of 21 Mar 2023 06:00  Patient On (Oxygen Delivery Method): room air      I&O's Detail    19 Mar 2023 07:01  -  20 Mar 2023 07:00  --------------------------------------------------------  IN:    Fat Emulsion (Fish Oil &amp; Plant Based) 20% Infusion: 270.8 mL    Heparin: 319 mL    IV PiggyBack: 200 mL    IV PiggyBack: 250 mL    Oral Fluid: 50 mL    TPN (Total Parenteral Nutrition): 1909 mL  Total IN: 2998.8 mL    OUT:    Amiodarone: 0 mL    Nasogastric/Oral tube (mL): 250 mL    Voided (mL): 1100 mL  Total OUT: 1350 mL    Total NET: 1648.8 mL      20 Mar 2023 07:01  -  21 Mar 2023 06:28  --------------------------------------------------------  IN:    Albumin 5%  - 250 mL: 250 mL    Amiodarone: 367.4 mL    Fat Emulsion (Fish Oil &amp; Plant Based) 20% Infusion: 249.7 mL    IV PiggyBack: 500 mL    IV PiggyBack: 250 mL    IV PiggyBack: 350 mL    TPN (Total Parenteral Nutrition): 1483 mL  Total IN: 3450.1 mL    OUT:    Bulb (mL): 580 mL    Drain (mL): 20 mL    Drain (mL): 410 mL    Fat Emulsion (Fish Oil &amp; Plant Based) 20% Infusion: 0 mL    Incontinent per Condom Catheter (mL): 700 mL    Nasogastric/Oral tube (mL): 350 mL  Total OUT: 2060 mL    Total NET: 1390.1 mL      Physical Exam:    General: NAD  HEENT: NC/AT, dry mucus membranes. NG tube in place with dark bilious output.  Pulmonary: Nonlabored breathing, no respiratory distress, CTAB  Cardiovascular: tachycardic, no murmurs  Abdominal: Mildly tense, diffusely tender to palpation (L>R) without guarding or rigidity, ND, midline incision well healing, ostomy reversal site with wet to dry dressing. LUQ drains x2 bilious, LLQ drain feculent and malodorous  Extremities: Left stump wrapped without strikethrough, WWP, no clubbing/cyanosis/edema  Neuro: A/O x3, CNs II-XII grossly intact,       LABS:                        7.4    21.26 )-----------( 590      ( 21 Mar 2023 06:06 )             22.6     03-20    137  |  105  |  18  ----------------------------<  143<H>  4.5   |  24  |  0.43<L>    Ca    7.7<L>      20 Mar 2023 05:30  Phos  4.3     03-20  Mg     2.4     03-20      PT/INR - ( 20 Mar 2023 05:30 )   PT: 16.4 sec;   INR: 1.37          PTT - ( 20 Mar 2023 05:30 )  PTT:49.8 sec      RADIOLOGY & ADDITIONAL STUDIES:

## 2023-03-21 NOTE — PROGRESS NOTE ADULT - ATTENDING COMMENTS
Over the weekend, developed worsening leukocytosis in conjunction with ongoing fevers and repeat CT imaging obtained. This demonstrated new, large left abdominal collection concerning for abscess. Underwent IR drainage with placement of 3 drains which are producing bilious and purulent output. Additionally, repeat cultures sent. His abdominal pain has improved since drain placement and at time of evaluation he is afebrile, hemodynamically stable, sinus tachycardia has improved HR in 90s, abdomen soft, mildly distended, LUQ drains are bile stained and pelvic drain is purulent.    Given the character of the fluid from the drains, concerned about whether there is an enteric leak from one of his two anastomoses. His distal anastomosis has been studied twice with contrast imaging without evidence of leak. Will discuss value in sending drain studies to characterize output. Will continue NPO, NG tube, PICC/TPN, and start octreotide to attempt to decrease output. At this point, given his multiple previous abdominal operations, most recently 2 weeks prior, returning to the OR for abdominal exploration would be extremely morbid and have high risk of bowel injury. His intraabdominal collections are now draining and appears to have source control. We will have to monitor and characterize output from the drains, continue nutritional optimization/supplementation, and awaiting cultures to tailor antibiotics. We will also reengage Palliative care who has been previously following patient.

## 2023-03-21 NOTE — PROGRESS NOTE ADULT - SUBJECTIVE AND OBJECTIVE BOX
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 CTAP increased ascites, enlarged peritoneal fluid, gas collections, new LUQ fluid collections c/f peritonitis & abscesses. S/p IR procedure with multiple catheter placement. ID following.     SUBJECTIVE/OBJECTIVE: met pt at bedside. withdrawn, tired. endorses mild abdominal pain that is currently intermittent and tolerable. discussed interval developments and GOC with pt and later spoke with pts sister and HCP, Sophie Martinezkarina as noted below.     ALLERGIES: No Known Allergies      MEDICATIONS: REVIEWED  MEDICATIONS  (STANDING):  aMIOdarone Infusion 0.5 mG/Min (16.7 mL/Hr) IV Continuous <Continuous>  caspofungin IVPB 50 milliGRAM(s) IV Intermittent every 24 hours  chlorhexidine 2% Cloths 1 Application(s) Topical daily  dextrose 5%. 1000 milliLiter(s) (50 mL/Hr) IV Continuous <Continuous>  dextrose 5%. 1000 milliLiter(s) (100 mL/Hr) IV Continuous <Continuous>  dextrose 50% Injectable 25 Gram(s) IV Push once  dextrose 50% Injectable 12.5 Gram(s) IV Push once  dextrose 50% Injectable 25 Gram(s) IV Push once  fat emulsion (Fish Oil and Plant Based) 20% Infusion 0.7764 Gm/kG/Day (20.83 mL/Hr) IV Continuous <Continuous>  fat emulsion (Fish Oil and Plant Based) 20% Infusion 0.7764 Gm/kG/Day (20.83 mL/Hr) IV Continuous <Continuous>  glucagon  Injectable 1 milliGRAM(s) IntraMuscular once  heparin  Infusion 1400 Unit(s)/Hr (16 mL/Hr) IV Continuous <Continuous>  influenza  Vaccine (HIGH DOSE) 0.7 milliLiter(s) IntraMuscular once  insulin lispro (ADMELOG) corrective regimen sliding scale   SubCutaneous every 6 hours  metoprolol tartrate Injectable 5 milliGRAM(s) IV Push every 6 hours  mirtazapine 30 milliGRAM(s) Oral at bedtime  nystatin Powder 1 Application(s) Topical two times a day  pantoprazole  Injectable 40 milliGRAM(s) IV Push every 12 hours  Parenteral Nutrition - Adult 1 Each (63 mL/Hr) TPN Continuous <Continuous>  Parenteral Nutrition - Adult 1 Each (63 mL/Hr) TPN Continuous <Continuous>  povidone iodine 10% Solution 1 Application(s) Topical daily  sodium chloride 0.9% lock flush 10 milliLiter(s) IV Push every 8 hours  tigecycline IVPB      tigecycline IVPB 50 milliGRAM(s) IV Intermittent every 12 hours    MEDICATIONS  (PRN):  benzocaine/menthol Lozenge 1 Lozenge Oral every 4 hours PRN Sore Throat  dextrose Oral Gel 15 Gram(s) Oral once PRN Blood Glucose LESS THAN 70 milliGRAM(s)/deciliter  HYDROmorphone  Injectable 0.25 milliGRAM(s) IV Push every 4 hours PRN Severe Pain (7 - 10)  ondansetron Injectable 4 milliGRAM(s) IV Push every 6 hours PRN Nausea and/or Vomiting  sodium chloride 0.9% lock flush 10 milliLiter(s) IV Push every 1 hour PRN Pre/post blood products, medications, blood draw, and to maintain line patency      Analgesic Use (Scheduled and PRNs) for past 24 hours (6a-6a):          T(C): 37.9 (03-22-23 @ 10:00), Max: 39.1 (03-21-23 @ 22:30)  HR: 90 (03-22-23 @ 10:00) (82 - 108)  BP: 132/73 (03-22-23 @ 10:00) (102/60 - 150/76)  RR: 20 (03-22-23 @ 10:00) (12 - 22)  SpO2: 100% (03-22-23 @ 10:00) (90% - 100%)  Wt(kg): --    CRITICAL CARE:  [ ]Shock Present  [ ]Septic [ ]Cardiogenic [ ]Neurologic [ ]Hypovolemic    [ ]Vasopressors [ ]Inotropes    [ ]Respiratory failure present   [ ]Mechanical Ventilation   [  ]Non-invasive ventilatory support   [ ]High-Flow  [ ]Acute  [ ]Chronic   [ ]Hypoxic  [ ]Hypercarbic   [ ]Other  [ ]Other organ failure     LABS: REVIEWED  CBC:                        7.3    28.90 )-----------( 650      ( 22 Mar 2023 05:30 )             22.7     CMP:    03-22    135  |  103  |  12  ----------------------------<  194<H>  4.8   |  25  |  0.52    Ca    7.6<L>      22 Mar 2023 05:30  Phos  3.9     03-22  Mg     2.4     03-22        RADIOLOGY & ADDITIONAL STUDIES:     DISCUSSION OF CASE:    CARE COORDINATION:      INTERVAL EVENTS SINCE LAST VISIT: 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 CTAP increased ascites, enlarged peritoneal fluid, gas collections, new LUQ fluid collections c/f peritonitis & abscesses. S/p IR procedure with multiple catheter placement. ID following.     SUBJECTIVE/OBJECTIVE: met pt at bedside. withdrawn, tired. endorses mild abdominal pain that is currently intermittent and tolerable. discussed interval developments and GOC with pt and later spoke with pts sister and HCP, Sophie Martinezkarina as noted below.     ALLERGIES: No Known Allergies      MEDICATIONS: REVIEWED  MEDICATIONS  (STANDING):  aMIOdarone Infusion 0.5 mG/Min (16.7 mL/Hr) IV Continuous <Continuous>  caspofungin IVPB 50 milliGRAM(s) IV Intermittent every 24 hours  chlorhexidine 2% Cloths 1 Application(s) Topical daily  dextrose 5%. 1000 milliLiter(s) (50 mL/Hr) IV Continuous <Continuous>  dextrose 5%. 1000 milliLiter(s) (100 mL/Hr) IV Continuous <Continuous>  dextrose 50% Injectable 25 Gram(s) IV Push once  dextrose 50% Injectable 12.5 Gram(s) IV Push once  dextrose 50% Injectable 25 Gram(s) IV Push once  fat emulsion (Fish Oil and Plant Based) 20% Infusion 0.7764 Gm/kG/Day (20.83 mL/Hr) IV Continuous <Continuous>  fat emulsion (Fish Oil and Plant Based) 20% Infusion 0.7764 Gm/kG/Day (20.83 mL/Hr) IV Continuous <Continuous>  glucagon  Injectable 1 milliGRAM(s) IntraMuscular once  heparin  Infusion 1400 Unit(s)/Hr (16 mL/Hr) IV Continuous <Continuous>  influenza  Vaccine (HIGH DOSE) 0.7 milliLiter(s) IntraMuscular once  insulin lispro (ADMELOG) corrective regimen sliding scale   SubCutaneous every 6 hours  metoprolol tartrate Injectable 5 milliGRAM(s) IV Push every 6 hours  mirtazapine 30 milliGRAM(s) Oral at bedtime  nystatin Powder 1 Application(s) Topical two times a day  pantoprazole  Injectable 40 milliGRAM(s) IV Push every 12 hours  Parenteral Nutrition - Adult 1 Each (63 mL/Hr) TPN Continuous <Continuous>  Parenteral Nutrition - Adult 1 Each (63 mL/Hr) TPN Continuous <Continuous>  povidone iodine 10% Solution 1 Application(s) Topical daily  sodium chloride 0.9% lock flush 10 milliLiter(s) IV Push every 8 hours  tigecycline IVPB      tigecycline IVPB 50 milliGRAM(s) IV Intermittent every 12 hours    MEDICATIONS  (PRN):  benzocaine/menthol Lozenge 1 Lozenge Oral every 4 hours PRN Sore Throat  dextrose Oral Gel 15 Gram(s) Oral once PRN Blood Glucose LESS THAN 70 milliGRAM(s)/deciliter  HYDROmorphone  Injectable 0.25 milliGRAM(s) IV Push every 4 hours PRN Severe Pain (7 - 10)  ondansetron Injectable 4 milliGRAM(s) IV Push every 6 hours PRN Nausea and/or Vomiting  sodium chloride 0.9% lock flush 10 milliLiter(s) IV Push every 1 hour PRN Pre/post blood products, medications, blood draw, and to maintain line patency      Analgesic Use (Scheduled and PRNs) for past 24 hours (6a-6a):      General: frail man lying in bed, NAD  HEENT: ++temporal wasting dry mucus membranes. NG tube in place with dark bilious output.  Pulmonary: Nonlabored breathing, no respiratory distress, CTAB  Cardiovascular: tachycardic, no murmurs  Abdominal: Mildly tense, diffusely tender to palpation (L>R) without guarding or rigidity, ND, midline incision well healing, ostomy reversal site with wet to dry dressing. LUQ drains x2 bilious, LLQ drain feculent and malodorous  Extremities: Left stump wrapped without strikethrough, WWP, no clubbing/cyanosis/edema  Neuro: A/O x3    T(C): 37.9 (03-22-23 @ 10:00), Max: 39.1 (03-21-23 @ 22:30)  HR: 90 (03-22-23 @ 10:00) (82 - 108)  BP: 132/73 (03-22-23 @ 10:00) (102/60 - 150/76)  RR: 20 (03-22-23 @ 10:00) (12 - 22)  SpO2: 100% (03-22-23 @ 10:00) (90% - 100%)  Wt(kg): --    LABS: REVIEWED  CBC:                        7.3    28.90 )-----------( 650      ( 22 Mar 2023 05:30 )             22.7     CMP:    03-22    135  |  103  |  12  ----------------------------<  194<H>  4.8   |  25  |  0.52    Ca    7.6<L>      22 Mar 2023 05:30  Phos  3.9     03-22  Mg     2.4     03-22        RADIOLOGY & ADDITIONAL STUDIES:   reviewed     DISCUSSION OF CASE:  - will SICU team

## 2023-03-21 NOTE — PROGRESS NOTE ADULT - ASSESSMENT
77M PMHx of IVDU found unresponsive at his nursing home, resolved after Narcan in the field and brought to [facility]. Found to have PE, atrial flutter, and large LV thrombus on echo. Transferred to [facility] for further management. Pt c/o abdominal pain on 12/10 CTA showing mid SMA with embolus. Abnormal distal small bowel loops and cecum with dilatation and pneumatosis suggesting infarcted bowel. Now s/p Ex lap, SMA embolectomy, 80cm SBR, abthera vac left in discontinuity (12/11). S/p second look (12/13), s/p OR for 3rd look, end-to-end anastomosis of remaining bowel, loop ileostomy and abdomen closure (12/15). S/p LORENZO and Cardioversion on 12/30 with cardiology. Patient was nutritionally optimized. 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 s/p L AKA closure (3/1), s/p ileostomy reversal (3/7) c/b ileus. Course c/b upper GI bleeding 3/13 w/ coffee ground emesis (resolved), sepsis 2/2 intra-abdominal abscesses. Now s/p IR drainage w/ 2 drains in the LUQ and 1 in the pelvis w/ 750 cc total output (3/20).      Neuro: Pain: LidoDerm, Tylenol PRN, Dilaudid PRN. Nausea: Zofran PRN.   ENT: Cepacol for sore throat  CV: Tachycardic 2/2 sepsis. Repeat echo with EF (02/11) 55-60%. Mildly dilated RV. Maintain MAP >65. A.Fib/Flutter: s/p cardioversion on 12/30. Cont metoprolol/ and IV amiodarone; hx HTN: holding spironolactone, Entresto.  Pulm: Comfortable on RA  GI/FEN: s/p ostomy reversal (3/7). Upper GI bleed (resolved): on PPI BID. SBO/ileus: NGT to LIWS, NPO. PICC w TPN. Appetite: Mirtazapine, VitC, MTV, Zinc.   : Voiding, Strict I and O.   Endo: mISS  Heme: LV thrombus w/ LLE ischemia: Heparin gtt held after GI bleed, resumed (3/17-3/20, currently held for IR procedure). Active T&S  ID: Leukocytosis and fevers: LLE ischemia s/p L AKA 2/15; VRE faecium and ESBL Klebsiella BSI (enteric napoleon), tigecycline (3/16--), Caspo (3/14--), vancomycin (2/10-2/11, 3/20--)  // DC: Zosyn, Fluconazole (3/9-13), Erta (2/13-3/10; 3/13), Dapto 2/11-3/9; 3/10-16), Tobra (3/13-16), Luis (2/10-12, 3/13-16)  PPX: SCDs  L/D/T: PIV, LUQ OLGA x2, LLQ OLGA x1  PT/OT: Not ordered  Dispo: SICU   77M PMHx of IVDU found unresponsive at his nursing home, resolved after Narcan in the field and brought to [facility]. Found to have PE, atrial flutter, and large LV thrombus on echo. Transferred to [facility] for further management. Pt c/o abdominal pain on 12/10 CTA showing mid SMA with embolus. Abnormal distal small bowel loops and cecum with dilatation and pneumatosis suggesting infarcted bowel. Now s/p Ex lap, SMA embolectomy, 80cm SBR, abthera vac left in discontinuity (12/11). S/p second look (12/13), s/p OR for 3rd look, end-to-end anastomosis of remaining bowel, loop ileostomy and abdomen closure (12/15). S/p LORENZO and Cardioversion on 12/30 with cardiology. Patient was nutritionally optimized. 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 s/p L AKA closure (3/1), s/p ileostomy reversal (3/7) c/b ileus. Course c/b upper GI bleeding 3/13 w/ coffee ground emesis (resolved), sepsis 2/2 intra-abdominal abscesses. Now s/p IR drainage w/ 2 drains in the LUQ and 1 in the pelvis w/ 750 cc total output (3/20).      Neuro: Pain: LidoDerm, Tylenol PRN, Dilaudid PRN. Nausea: Zofran PRN.   ENT: Cepacol for sore throat  CV: Tachycardic 2/2 sepsis. Repeat echo with EF (02/11) 55-60%. Mildly dilated RV. Maintain MAP >65. A.Fib/Flutter: s/p cardioversion on 12/30. Cont metoprolol/ and IV amiodarone; hx HTN: holding spironolactone, Entresto.  Pulm: Comfortable on RA  GI/FEN: s/p ostomy reversal (3/7). Upper GI bleed (resolved): on PPI BID. SBO/ileus: NGT to LIWS, NPO. PICC w TPN. Appetite: Mirtazapine, VitC, MTV, Zinc.   : Voiding, Strict I and O.   Endo: mISS  Heme: LV thrombus w/ LLE ischemia: Heparin gtt held after GI bleed, resumed (3/17-3/20, currently held for IR procedure). Active T&S  ID: Leukocytosis and fevers: LLE ischemia s/p L AKA 2/15; VRE faecium and ESBL Klebsiella BSI (enteric napoleon), tigecycline (3/16--), Caspo (3/14--), vancomycin (2/10-2/11, 3/20--)  // DC: Zosyn, Fluconazole (3/9-13), Erta (2/13-3/10; 3/13), Dapto 2/11-3/9; 3/10-16), Tobra (3/13-16), Luis (2/10-12, 3/13-16)  PPX: SCDs  L/D/T: PIV, LUQ drains x2, LLQ drain x1  PT/OT: Not ordered  Dispo: SICU   77M PMHx of IVDU found unresponsive at his nursing home, resolved after Narcan in the field and brought to [facility]. Found to have PE, atrial flutter, and large LV thrombus on echo. Transferred to [facility] for further management. Pt c/o abdominal pain on 12/10 CTA showing mid SMA with embolus. Abnormal distal small bowel loops and cecum with dilatation and pneumatosis suggesting infarcted bowel. Now s/p Ex lap, SMA embolectomy, 80cm SBR, abthera vac left in discontinuity (12/11). S/p second look (12/13), s/p OR for 3rd look, end-to-end anastomosis of remaining bowel, loop ileostomy and abdomen closure (12/15). S/p LORENZO and Cardioversion on 12/30 with cardiology. Patient was nutritionally optimized. 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 s/p L AKA closure (3/1), s/p ileostomy reversal (3/7) c/b ileus. Course c/b upper GI bleeding 3/13 w/ coffee ground emesis (resolved), sepsis 2/2 intra-abdominal abscesses. Now s/p IR drainage w/ 2 drains in the LUQ and 1 in the pelvis w/ 750 cc total output (3/20).    Neuro: Pain: LidoDerm, Tylenol PRN, Dilaudid PRN. Nausea: Zofran PRN. Depression/Appetite: Mirtazapine.   ENT: Cepacol for sore throat  CV: Euvolemic as of this morning. Tachycardic 2/2 sepsis. Repeat echo with EF (02/11) 55-60%. Mildly dilated RV. Maintain MAP >65. A.Fib/Flutter: s/p cardioversion on 12/30. Cont metoprolol and IV amiodarone; hx HTN: holding spironolactone, Entresto.  Pulm: Comfortable on RA  GI/FEN: s/p ostomy reversal (3/7). Upper GI bleed (resolved): on PPI BID. SBO/ileus: NGT to LIWS, NPO. PICC w TPN. VitC, MTV, Zinc, Octreotide in TPN for high drain output.  : Voiding, Strict I and O.   Endo: mISS  Heme: LV thrombus w/ LLE ischemia: Heparin gtt held after GI bleed, resumed (3/17-3/20, currently held for IR procedure). Active T&S  ID: Sepsis 2/2 intra-abdominal abscesses, s/p IR drains x3 3/20. Febrile with Tmax 101.5 overnight. Leukocytosis downtrending. LLE ischemia s/p L AKA 2/15; VRE faecium and ESBL Klebsiella BSI (enteric napoleon), tigecycline (3/16--), Caspo (3/14--), vancomycin (2/10-2/11, 3/20--). F/U abscess cultures, blood culture (3/20) // DC: Zosyn, Fluconazole (3/9-13), Erta (2/13-3/10; 3/13), Dapto 2/11-3/9; 3/10-16), Tobra (3/13-16), Luis (2/10-12, 3/13-16)  PPX: SCDs  L/D/T: PIV, LUQ drains x2, LLQ drain  PT/OT: Not ordered  Dispo: SICU   77M PMHx of IVDU found unresponsive at his nursing home, resolved after Narcan in the field and brought to [facility]. Found to have PE, atrial flutter, and large LV thrombus on echo. Transferred to [facility] for further management. Pt c/o abdominal pain on 12/10 CTA showing mid SMA with embolus. Abnormal distal small bowel loops and cecum with dilatation and pneumatosis suggesting infarcted bowel. Now s/p Ex lap, SMA embolectomy, 80cm SBR, abthera vac left in discontinuity (12/11). S/p second look (12/13), s/p OR for 3rd look, end-to-end anastomosis of remaining bowel, loop ileostomy and abdomen closure (12/15). S/p LORENZO and Cardioversion on 12/30 with cardiology. Patient was nutritionally optimized. 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 s/p L AKA closure (3/1), s/p ileostomy reversal (3/7) c/b ileus. Course c/b upper GI bleeding 3/13 w/ coffee ground emesis (resolved), sepsis 2/2 intra-abdominal abscesses. Now s/p IR drainage w/ 2 drains in the LUQ and 1 in the pelvis w/ 750 cc total output (3/20).    Neuro: Pain: LidoDerm, Tylenol PRN, Dilaudid PRN. Nausea: Zofran PRN. Depression/Appetite: Mirtazapine.   ENT: Cepacol for sore throat  CV: Euvolemic as of this morning. Tachycardic 2/2 sepsis. Repeat echo with EF (02/11) 55-60%. Mildly dilated RV. Maintain MAP >65. A.Fib/Flutter: s/p cardioversion on 12/30. Cont metoprolol and IV amiodarone; hx HTN: holding spironolactone, Entresto.  Pulm: Comfortable on RA  GI/FEN: s/p ostomy reversal (3/7). Upper GI bleed (resolved): on PPI BID. SBO/ileus: NGT to LIWS, NPO. PICC w TPN. VitC, MTV, Zinc, Octreotide in TPN for high drain output.  : Voiding, Strict I and O.   Endo: mISS  Heme: LV thrombus w/ LLE ischemia: Heparin gtt held after GI bleed, resumed (3/17-3/20, 3/21-). Active T&S  ID: Sepsis 2/2 intra-abdominal abscesses, s/p IR drains x3 3/20. Febrile with Tmax 101.5 overnight. Leukocytosis downtrending. LLE ischemia s/p L AKA 2/15; VRE faecium and ESBL Klebsiella BSI (enteric napoleon), tigecycline (3/16--), Caspo (3/14--), vancomycin (2/10-2/11, 3/20--). F/U abscess cultures, blood culture (3/20) // DC: Zosyn, Fluconazole (3/9-13), Erta (2/13-3/10; 3/13), Dapto 2/11-3/9; 3/10-16), Tobra (3/13-16), Luis (2/10-12, 3/13-16)  PPX: SCDs  L/D/T: PIV, LUQ drains x2, LLQ drain  PT/OT: Not ordered  Dispo: SICU

## 2023-03-21 NOTE — PROGRESS NOTE ADULT - ASSESSMENT
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  Patient afebrile since drainage.  Fluid cultures from OR with Klebsiella suggesting this is the source of the bacteremia. Now culture with Candida albicans and still febrile with leukocytosis. Abdominal fluid collection not drained. Now with GIB at risk for breakthrough bacteremia. Still with intermittent fevers. CTAP showed increased ascites, enlarged peritoneal fluid, gas collections, new LUQ fluid collections c/f peritonitis & abscesses. S/p IR procedure with multiple catheter placement.   - C/w IV Tigecycline 50mg q12hr  - C/w IV Caspofungin 50mg IV daily   - Monitor QTc  - BCx 3/17 & 3/20 NGTD 3d & 12h respectively   - If patient without improvement in WBC, fever curve consider return to OR for source control, and reconsult palliative care to discuss GOC for possible ABx futility i/s/o absent source control     ID team 1 will follow

## 2023-03-22 NOTE — PROGRESS NOTE ADULT - ASSESSMENT
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  Patient afebrile since drainage.  Fluid cultures from OR with Klebsiella suggesting this is the source of the bacteremia. Now culture with Candida albicans and still febrile with leukocytosis. Abdominal fluid collection not drained. Now with GIB at risk for breakthrough bacteremia. Still with intermittent fevers. CTAP showed increased ascites, enlarged peritoneal fluid, gas collections, new LUQ fluid collections c/f peritonitis & abscesses. S/p IR procedure with multiple catheter placement.   - C/w IV Tigecycline 50mg q12hr  - C/w IV Caspofungin 50mg IV daily   - Monitor QTc  - BCx 3/17 & 3/20 NGTD 4d & 1drespectively   - f/u abscess culture for ?Gram positive cocci  - f/u body fluid culture for Klebsiella pneumoniae susceptibility (likely ESBL given previous data)  - f/u tigecycline E test   - If patient without improvement in WBC, fever curve consider return to OR for source control, and reconsult palliative care to discuss GOC for possible ABx futility i/s/o absent source control     ID team 1 will follow 78M with prolonged course c/b mesenteric ischemia s/p SBR and development of RLQ abscess, LLE ischemia s/p L AKA 2/15; VRE faecium and ESBL Klebsiella BSI (enteric napoleon) likely 2/2 intra-abdominal abscess s/p drainage.  Patient afebrile since drainage.  Fluid cultures from OR with Klebsiella suggesting this is the source of the bacteremia. Now culture with Candida albicans and still febrile with leukocytosis. Abdominal fluid collection not drained. Now with GIB at risk for breakthrough bacteremia. Still with intermittent fevers. CTAP showed increased ascites, enlarged peritoneal fluid, gas collections, new LUQ fluid collections c/f peritonitis & abscesses. S/p IR procedure with multiple catheter placement.   - C/w IV Tigecycline 50mg q12hr  - C/w IV Caspofungin 50mg IV daily   - Monitor QTc  - BCx 3/17 & 3/20 NGTD 4d & 1drespectively   - f/u abscess culture for ?Gram positive cocci  - f/u body fluid culture for Klebsiella pneumoniae susceptibility (likely ESBL given previous data)  - f/u tigecycline E test   - If patient without improvement in WBC, fever curve consider return to OR for source control, would consider reconsult palliative care to discuss GOC for possible ABx futility i/s/o absent source control if fails to improve over the next day or two    ID team 1 will follow

## 2023-03-22 NOTE — PROGRESS NOTE ADULT - ASSESSMENT
77M PMHx of IVDU found unresponsive at his nursing home, resolved after Narcan in the field and brought to [facility]. Found to have PE, atrial flutter, and large LV thrombus on echo. Transferred to [facility] for further management. Pt c/o abdominal pain on 12/10 CTA showing mid SMA with embolus. Abnormal distal small bowel loops and cecum with dilatation and pneumatosis suggesting infarcted bowel. Now s/p Ex lap, SMA embolectomy, 80cm SBR, abthera vac left in discontinuity (12/11). S/p second look (12/13), s/p OR for 3rd look, end-to-end anastomosis of remaining bowel, loop ileostomy and abdomen closure (12/15). S/p LORENZO and Cardioversion on 12/30 with cardiology. Patient was nutritionally optimized. 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 s/p L AKA closure (3/1), s/p ileostomy reversal (3/7) c/b ileus. Course c/b upper GI bleeding 3/13 w/ coffee ground emesis (resolved), sepsis 2/2 intra-abdominal abscesses. Now s/p IR drainage w/ 2 drains in the LUQ and 1 in the pelvis w/ 750 cc total output (3/20).    Neuro: Pain: LidoDerm, Tylenol PRN, Dilaudid PRN. Nausea: Zofran PRN. Depression/Appetite: Mirtazapine.   ENT: Cepacol for sore throat  CV: Tachycardic 2/2 sepsis. Repeat echo with EF (02/11) 55-60%. Mildly dilated RV. Maintain MAP >65. AFib/Flutter: s/p cardioversion on 12/30. Cont metoprolol and IV amiodarone; hx HTN: holding spironolactone, Entresto.  Pulm: Comfortable on RA  GI/FEN: s/p ostomy reversal (3/7). Upper GI bleed (resolved): on PPI BID. SBO/ileus: NGT to LIWS, NPO. PICC w TPN. VitC, MTV, Zinc, Octreotide in TPN.  : Voiding, Strict I and O.   Endo: mISS  Heme: LV thrombus w/ LLE ischemia: Heparin gtt. Active T&S  ID: Sepsis 2/2 intra-abdominal abscesses, s/p IR drains x3 3/20. LLE ischemia s/p L AKA 2/15; VRE faecium and ESBL Klebsiella BSI (enteric napoleon), tigecycline (3/16--), Caspo (3/14--), vancomycin (2/10-2/11, 3/20--)  // DC: Zosyn, Fluconazole (3/9-13), Erta (2/13-3/10; 3/13), Dapto 2/11-3/9; 3/10-16), Tobra (3/13-16), Luis (2/10-12, 3/13-16)  PPX: SCDs  L/D/T: PIV, LUQ drains x2, LLQ drain  PT/OT: Not ordered  Dispo: SICU   77M PMHx of IVDU found unresponsive at his nursing home, resolved after Narcan in the field and brought to [facility]. Found to have PE, atrial flutter, and large LV thrombus on echo. Transferred to [facility] for further management. Pt c/o abdominal pain on 12/10 CTA showing mid SMA with embolus. Abnormal distal small bowel loops and cecum with dilatation and pneumatosis suggesting infarcted bowel. Now s/p Ex lap, SMA embolectomy, 80cm SBR, abthera vac left in discontinuity (12/11). S/p second look (12/13), s/p OR for 3rd look, end-to-end anastomosis of remaining bowel, loop ileostomy and abdomen closure (12/15). S/p LORENZO and Cardioversion on 12/30 with cardiology. Patient was nutritionally optimized. 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 s/p L AKA closure (3/1), s/p ileostomy reversal (3/7) c/b ileus. Course c/b upper GI bleeding 3/13 w/ coffee ground emesis (resolved), sepsis 2/2 intra-abdominal abscesses. Now s/p IR drainage w/ 2 drains in the LUQ and 1 in the pelvis w/ 750 cc total output (3/20).    Neuro: Pain: LidoDerm, Tylenol PRN, Dilaudid PRN. Nausea: Zofran PRN. Depression/Appetite: Mirtazapine.   ENT: Cepacol for sore throat  CV: Tachycardic 2/2 sepsis. Repeat echo with EF (02/11) 55-60%. Mildly dilated RV. Maintain MAP >65. AFib/Flutter: s/p cardioversion on 12/30. Cont metoprolol and IV amiodarone; hx HTN: holding spironolactone, Entresto.  Pulm: Comfortable on RA  GI/FEN: s/p ostomy reversal (3/7). Upper GI bleed (resolved): on PPI BID. SBO/ileus: NGT to LIWS, NPO. PICC w TPN. VitC, MTV, Zinc, Octreotide in TPN.  : Voiding, Strict I and O.   Endo: mISS  Heme: LV thrombus w/ LLE ischemia: Heparin gtt. Active T&S  ID: Leukocytosis uptrending. Sepsis 2/2 intra-abdominal abscesses, s/p IR drains x3 3/20. LLE ischemia s/p L AKA 2/15; VRE faecium and ESBL Klebsiella BSI (enteric napoleon), tigecycline (3/16--), Caspo (3/14--), vancomycin (2/10-2/11, 3/20--)  // DC: Zosyn, Fluconazole (3/9-13), Erta (2/13-3/10; 3/13), Dapto 2/11-3/9; 3/10-16), Tobra (3/13-16), Luis (2/10-12, 3/13-16)  PPX: SCDs  L/D/T: PIV, LUQ drains x2, LLQ drain  PT/OT: Not ordered  Dispo: SICU   77M PMHx of IVDU found unresponsive at his nursing home, resolved after Narcan in the field and brought to [facility]. Found to have PE, atrial flutter, and large LV thrombus on echo. Transferred to [facility] for further management. Pt c/o abdominal pain on 12/10 CTA showing mid SMA with embolus. Abnormal distal small bowel loops and cecum with dilatation and pneumatosis suggesting infarcted bowel. Now s/p Ex lap, SMA embolectomy, 80cm SBR, abthera vac left in discontinuity (12/11). S/p second look (12/13), s/p OR for 3rd look, end-to-end anastomosis of remaining bowel, loop ileostomy and abdomen closure (12/15). S/p LORENZO and Cardioversion on 12/30 with cardiology. Patient was nutritionally optimized. 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 s/p L AKA closure (3/1), s/p ileostomy reversal (3/7) c/b ileus. Course c/b upper GI bleeding 3/13 w/ coffee ground emesis (resolved), sepsis 2/2 intra-abdominal abscesses. Now s/p IR drainage w/ 2 drains in the LUQ and 1 in the pelvis w/ 750 cc total output (3/20).    Neuro: Pain: LidoDerm, Tylenol PRN, Dilaudid PRN. Nausea: Zofran PRN. Depression/Appetite: Mirtazapine.   ENT: Cepacol for sore throat  CV: Remains euvolemic. Tachycardic 2/2 sepsis. Repeat echo with EF (02/11) 55-60%. Mildly dilated RV. Maintain MAP >65. AFib/Flutter: s/p cardioversion on 12/30. Cont metoprolol and IV amiodarone; hx HTN: holding spironolactone, Entresto.  Pulm: Comfortable on RA  GI/FEN: s/p ostomy reversal (3/7). Upper GI bleed (resolved): on PPI BID. SBO/ileus: NGT to LIWS, NPO. PICC w TPN. VitC, MTV, Zinc, Octreotide in TPN.  : Voiding, Strict I and O.   Endo: mISS  Heme: LV thrombus w/ LLE ischemia: Heparin gtt. Active T&S  ID: Leukocytosis uptrending. Sepsis 2/2 intra-abdominal abscesses, s/p IR drains x3 3/20. LLE ischemia s/p L AKA 2/15; VRE faecium and ESBL Klebsiella BSI (enteric napoleon), tigecycline (3/16--), Caspo (3/14--), vancomycin (2/10-2/11, 3/20--)  // DC: Zosyn, Fluconazole (3/9-13), Erta (2/13-3/10; 3/13), Dapto 2/11-3/9; 3/10-16), Tobra (3/13-16), Luis (2/10-12, 3/13-16)  PPX: SCDs  L/D/T: PIV, LUQ drains x2, LLQ drain  PT/OT: Not ordered  Dispo: SICU   77M PMHx of IVDU found unresponsive at his nursing home, resolved after Narcan in the field and brought to [facility]. Found to have PE, atrial flutter, and large LV thrombus on echo. Transferred to [facility] for further management. Pt c/o abdominal pain on 12/10 CTA showing mid SMA with embolus. Abnormal distal small bowel loops and cecum with dilatation and pneumatosis suggesting infarcted bowel. Now s/p Ex lap, SMA embolectomy, 80cm SBR, abthera vac left in discontinuity (12/11). S/p second look (12/13), s/p OR for 3rd look, end-to-end anastomosis of remaining bowel, loop ileostomy and abdomen closure (12/15). S/p LORENOZ and Cardioversion on 12/30 with cardiology. Patient was nutritionally optimized. 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 s/p L AKA closure (3/1), s/p ileostomy reversal (3/7) c/b ileus. Course c/b upper GI bleeding 3/13 w/ coffee ground emesis (resolved), sepsis 2/2 intra-abdominal abscesses. Now s/p IR drainage w/ 2 drains in the LUQ and 1 in the pelvis w/ 750 cc total output (3/20).    Neuro: Pain: LidoDerm, Tylenol PRN, Dilaudid PRN. Nausea: Zofran PRN. Depression/Appetite: Mirtazapine.   ENT: Cepacol for sore throat  CV: Remains euvolemic. Tachycardic 2/2 sepsis. Repeat echo with EF (02/11) 55-60%. Mildly dilated RV. Maintain MAP >65. AFib/Flutter: s/p cardioversion on 12/30. Cont metoprolol and IV amiodarone; hx HTN: holding spironolactone, Entresto.  Pulm: Comfortable on RA  GI/FEN: s/p ostomy reversal (3/7). Upper GI bleed (resolved): on PPI BID. SBO/ileus: NGT to LIWS, NPO. PICC w TPN. VitC, MTV, Zinc, Octreotide in TPN.  : Voiding, Strict I and O.   Endo: mISS  Heme: LV thrombus w/ LLE ischemia: Heparin gtt. Active T&S  ID: Sepsis 2/2 intra-abdominal abscesses, s/p IR drains x3 3/20. Leukocytosis uptrending - reactive vs. insufficient drainage. Low suspicion for alternate source. LLE ischemia s/p L AKA 2/15; VRE faecium and ESBL Klebsiella BSI (enteric napoleon), tigecycline (3/16--), Caspo (3/14--), vancomycin (2/10-2/11, 3/20--)  // DC: Zosyn, Fluconazole (3/9-13), Erta (2/13-3/10; 3/13), Dapto 2/11-3/9; 3/10-16), Tobra (3/13-16), Luis (2/10-12, 3/13-16)  PPX: SCDs  L/D/T: PIV, LUQ drains x2, LLQ drain  PT/OT: Not ordered  Dispo: SICU   77M PMHx of IVDU found unresponsive at his nursing home, resolved after Narcan in the field and brought to [facility]. Found to have PE, atrial flutter, and large LV thrombus on echo. Transferred to [facility] for further management. Pt c/o abdominal pain on 12/10 CTA showing mid SMA with embolus. Abnormal distal small bowel loops and cecum with dilatation and pneumatosis suggesting infarcted bowel. Now s/p Ex lap, SMA embolectomy, 80cm SBR, abthera vac left in discontinuity (12/11). S/p second look (12/13), s/p OR for 3rd look, end-to-end anastomosis of remaining bowel, loop ileostomy and abdomen closure (12/15). S/p LORENZO and Cardioversion on 12/30 with cardiology. Patient was nutritionally optimized. 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 s/p L AKA closure (3/1), s/p ileostomy reversal (3/7) c/b ileus. Course c/b upper GI bleeding 3/13 w/ coffee ground emesis (resolved), sepsis 2/2 intra-abdominal abscesses. Now s/p IR drainage w/ 2 drains in the LUQ and 1 in the pelvis w/ 750 cc total output (3/20).    Neuro: Pain: LidoDerm, Tylenol PRN, Dilaudid PRN. Nausea: Zofran PRN. Depression/Appetite: Mirtazapine.   ENT: Cepacol for sore throat  CV: Remains euvolemic. Tachycardic 2/2 sepsis. Repeat echo with EF (02/11) 55-60%. Mildly dilated RV. Maintain MAP >65. AFib/Flutter: s/p cardioversion on 12/30. Cont metoprolol and IV amiodarone; hx HTN: holding spironolactone, Entresto.  Pulm: Comfortable on RA  GI/FEN: s/p ostomy reversal (3/7). Upper GI bleed (resolved): on PPI BID. SBO/ileus: NGT to LIWS, NPO. PICC w TPN. VitC, MTV, Zinc, Octreotide in TPN.  : Voiding, Strict I and O.   Endo: mISS  Heme: LV thrombus w/ LLE ischemia: Heparin gtt. Active T&S  ID: Sepsis 2/2 intra-abdominal abscesses, s/p IR drains x3 3/20. Pelvic and LUQ #2 drains - Klebsiella pneumoniae, sensitivities pending. Surveillance BCx 3/20 NGTD. Leukocytosis uptrending - reactive vs. insufficient drainage. Low suspicion for alternate source. LLE ischemia s/p L AKA 2/15; VRE faecium and ESBL Klebsiella BSI (enteric napoleon), tigecycline (3/16--), Caspo (3/14--), vancomycin (2/10-2/11, 3/20--)  // DC: Zosyn, Fluconazole (3/9-13), Erta (2/13-3/10; 3/13), Dapto 2/11-3/9; 3/10-16), Tobra (3/13-16), Luis (2/10-12, 3/13-16)  PPX: SCDs  L/D/T: PIV, LUQ drains x2, LLQ drain  PT/OT: Not ordered  Dispo: SICU   77M PMHx of IVDU found unresponsive at his nursing home, resolved after Narcan in the field and brought to [facility]. Found to have PE, atrial flutter, and large LV thrombus on echo. Transferred to [facility] for further management. Pt c/o abdominal pain on 12/10 CTA showing mid SMA with embolus. Abnormal distal small bowel loops and cecum with dilatation and pneumatosis suggesting infarcted bowel. Now s/p Ex lap, SMA embolectomy, 80cm SBR, abthera vac left in discontinuity (12/11). S/p second look (12/13), s/p OR for 3rd look, end-to-end anastomosis of remaining bowel, loop ileostomy and abdomen closure (12/15). S/p LORENZO and Cardioversion on 12/30 with cardiology. Patient was nutritionally optimized. 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 s/p L AKA closure (3/1), s/p ileostomy reversal (3/7) c/b ileus. Course c/b upper GI bleeding 3/13 w/ coffee ground emesis (resolved), sepsis 2/2 intra-abdominal abscesses. Now s/p IR drainage w/ 2 drains in the LUQ and 1 in the pelvis w/ 750 cc total output (3/20).    Neuro: Pain: LidoDerm, Tylenol PRN, Dilaudid PRN. Nausea: Zofran PRN. Depression/Appetite: Mirtazapine.   ENT: Cepacol for sore throat  CV: Remains euvolemic. Tachycardic 2/2 sepsis. Repeat echo with EF (02/11) 55-60%. Mildly dilated RV. Maintain MAP >65. AFib/Flutter: s/p cardioversion on 12/30. Cont metoprolol and IV amiodarone; hx HTN: holding spironolactone, Entresto.  Pulm: Comfortable on RA  GI/FEN: s/p ostomy reversal (3/7). Upper GI bleed (resolved): on PPI BID. SBO/ileus: NGT to LIWS, NPO. PICC w TPN. VitC, MTV, Zinc, Octreotide in TPN.  : Voiding, Strict I and O.   Endo: mISS  Heme: LV thrombus w/ LLE ischemia: Heparin gtt. Active T&S  ID: Sepsis 2/2 intra-abdominal abscesses, s/p IR drains x3 3/20. Pelvic and LUQ #2 drains - Klebsiella pneumoniae, sensitivities pending. Surveillance BCx 3/20 NGTD. Leukocytosis uptrending - reactive vs. insufficient drainage. Low suspicion for alternate source. LLE ischemia s/p L AKA 2/15; VRE faecium and ESBL Klebsiella BSI (enteric napoleon), tigecycline (3/16-), Caspo (3/14-), vancomycin (2/10-2/11, 3/20-), Daptomycin 2/11-3/9; 3/10-16, 3/22--) // DC: Zosyn, Fluconazole (3/9-13), Erta (2/13-3/10; 3/13), Tobra (3/13-16), Luis (2/10-12, 3/13-16). Weekly CK for monitoring on Daptomycin  PPX: SCDs  L/D/T: PIV, LUQ drains x2, LLQ drain  PT/OT: Not ordered  Dispo: SICU

## 2023-03-22 NOTE — PROGRESS NOTE ADULT - SUBJECTIVE AND OBJECTIVE BOX
*************** INCOMPLETE *****************    INFECTIOUS DISEASES CONSULT FOLLOW-UP NOTE    INTERVAL HPI/OVERNIGHT EVENTS:      ROS:   Constitutional, eyes, ENT, cardiovascular, respiratory, gastrointestinal, genitourinary, integumentary, neurological, psychiatric and heme/lymph are otherwise negative other than noted above       ANTIBIOTICS/RELEVANT:    MEDICATIONS  (STANDING):  aMIOdarone Infusion 0.5 mG/Min (16.7 mL/Hr) IV Continuous <Continuous>  caspofungin IVPB 50 milliGRAM(s) IV Intermittent every 24 hours  chlorhexidine 2% Cloths 1 Application(s) Topical daily  dextrose 5%. 1000 milliLiter(s) (50 mL/Hr) IV Continuous <Continuous>  dextrose 5%. 1000 milliLiter(s) (100 mL/Hr) IV Continuous <Continuous>  dextrose 50% Injectable 25 Gram(s) IV Push once  dextrose 50% Injectable 12.5 Gram(s) IV Push once  dextrose 50% Injectable 25 Gram(s) IV Push once  fat emulsion (Fish Oil and Plant Based) 20% Infusion 0.7764 Gm/kG/Day (20.83 mL/Hr) IV Continuous <Continuous>  glucagon  Injectable 1 milliGRAM(s) IntraMuscular once  heparin  Infusion 1400 Unit(s)/Hr (16 mL/Hr) IV Continuous <Continuous>  influenza  Vaccine (HIGH DOSE) 0.7 milliLiter(s) IntraMuscular once  insulin lispro (ADMELOG) corrective regimen sliding scale   SubCutaneous every 6 hours  metoprolol tartrate Injectable 5 milliGRAM(s) IV Push every 6 hours  mirtazapine 30 milliGRAM(s) Oral at bedtime  nystatin Powder 1 Application(s) Topical two times a day  pantoprazole  Injectable 40 milliGRAM(s) IV Push every 12 hours  Parenteral Nutrition - Adult 1 Each (63 mL/Hr) TPN Continuous <Continuous>  povidone iodine 10% Solution 1 Application(s) Topical daily  sodium chloride 0.9% lock flush 10 milliLiter(s) IV Push every 8 hours  tigecycline IVPB      tigecycline IVPB 50 milliGRAM(s) IV Intermittent every 12 hours  vancomycin  IVPB 1250 milliGRAM(s) IV Intermittent once  vancomycin  IVPB 1250 milliGRAM(s) IV Intermittent every 12 hours    MEDICATIONS  (PRN):  benzocaine/menthol Lozenge 1 Lozenge Oral every 4 hours PRN Sore Throat  dextrose Oral Gel 15 Gram(s) Oral once PRN Blood Glucose LESS THAN 70 milliGRAM(s)/deciliter  HYDROmorphone  Injectable 0.25 milliGRAM(s) IV Push every 4 hours PRN Severe Pain (7 - 10)  ondansetron Injectable 4 milliGRAM(s) IV Push every 6 hours PRN Nausea and/or Vomiting  sodium chloride 0.9% lock flush 10 milliLiter(s) IV Push every 1 hour PRN Pre/post blood products, medications, blood draw, and to maintain line patency        Vital Signs Last 24 Hrs  T(C): 38.1 (22 Mar 2023 06:00), Max: 39.1 (21 Mar 2023 22:30)  T(F): 100.6 (22 Mar 2023 06:00), Max: 102.4 (21 Mar 2023 22:30)  HR: 89 (22 Mar 2023 08:00) (82 - 108)  BP: 126/72 (22 Mar 2023 08:00) (100/59 - 150/76)  BP(mean): 93 (22 Mar 2023 08:00) (75 - 106)  RR: 19 (22 Mar 2023 08:00) (12 - 22)  SpO2: 98% (22 Mar 2023 08:00) (90% - 100%)    Parameters below as of 22 Mar 2023 08:00  Patient On (Oxygen Delivery Method): nasal cannula  O2 Flow (L/min): 2      03-21-23 @ 07:01  -  03-22-23 @ 07:00  --------------------------------------------------------  IN: 3329.4 mL / OUT: 3135 mL / NET: 194.4 mL    03-22-23 @ 07:01  -  03-22-23 @ 08:10  --------------------------------------------------------  IN: 32.7 mL / OUT: 0 mL / NET: 32.7 mL      PHYSICAL EXAM:  Constitutional: alert, NAD  Eyes: the sclera and conjunctiva were normal.   ENT: the ears and nose were normal in appearance.   Neck: the appearance of the neck was normal and the neck was supple.   Pulmonary: no respiratory distress and lungs were clear to auscultation bilaterally.   Heart: heart rate was normal and rhythm regular, normal S1 and S2  Vascular:. there was no peripheral edema  Abdomen: normal bowel sounds, soft, non-tender  Neurological: no focal deficits.   Psychiatric: the affect was normal        LABS:                        7.3    28.90 )-----------( 650      ( 22 Mar 2023 05:30 )             22.7     03-22    135  |  103  |  12  ----------------------------<  194<H>  4.8   |  25  |  0.52    Ca    7.6<L>      22 Mar 2023 05:30  Phos  3.9     03-22  Mg     2.4     03-22      PT/INR - ( 22 Mar 2023 05:30 )   PT: 15.8 sec;   INR: 1.32          PTT - ( 22 Mar 2023 05:30 )  PTT:66.3 sec      MICROBIOLOGY:      RADIOLOGY & ADDITIONAL STUDIES:  Reviewed INFECTIOUS DISEASES CONSULT FOLLOW-UP NOTE    INTERVAL HPI/OVERNIGHT EVENTS: Fever 102.4, given tylenol    ROS:   Constitutional, eyes, ENT, cardiovascular, respiratory, gastrointestinal, genitourinary, integumentary, neurological, psychiatric and heme/lymph are otherwise negative other than noted above       ANTIBIOTICS/RELEVANT:    MEDICATIONS  (STANDING):  aMIOdarone Infusion 0.5 mG/Min (16.7 mL/Hr) IV Continuous <Continuous>  caspofungin IVPB 50 milliGRAM(s) IV Intermittent every 24 hours  chlorhexidine 2% Cloths 1 Application(s) Topical daily  dextrose 5%. 1000 milliLiter(s) (50 mL/Hr) IV Continuous <Continuous>  dextrose 5%. 1000 milliLiter(s) (100 mL/Hr) IV Continuous <Continuous>  dextrose 50% Injectable 25 Gram(s) IV Push once  dextrose 50% Injectable 12.5 Gram(s) IV Push once  dextrose 50% Injectable 25 Gram(s) IV Push once  fat emulsion (Fish Oil and Plant Based) 20% Infusion 0.7764 Gm/kG/Day (20.83 mL/Hr) IV Continuous <Continuous>  glucagon  Injectable 1 milliGRAM(s) IntraMuscular once  heparin  Infusion 1400 Unit(s)/Hr (16 mL/Hr) IV Continuous <Continuous>  influenza  Vaccine (HIGH DOSE) 0.7 milliLiter(s) IntraMuscular once  insulin lispro (ADMELOG) corrective regimen sliding scale   SubCutaneous every 6 hours  metoprolol tartrate Injectable 5 milliGRAM(s) IV Push every 6 hours  mirtazapine 30 milliGRAM(s) Oral at bedtime  nystatin Powder 1 Application(s) Topical two times a day  pantoprazole  Injectable 40 milliGRAM(s) IV Push every 12 hours  Parenteral Nutrition - Adult 1 Each (63 mL/Hr) TPN Continuous <Continuous>  povidone iodine 10% Solution 1 Application(s) Topical daily  sodium chloride 0.9% lock flush 10 milliLiter(s) IV Push every 8 hours  tigecycline IVPB      tigecycline IVPB 50 milliGRAM(s) IV Intermittent every 12 hours  vancomycin  IVPB 1250 milliGRAM(s) IV Intermittent once  vancomycin  IVPB 1250 milliGRAM(s) IV Intermittent every 12 hours    MEDICATIONS  (PRN):  benzocaine/menthol Lozenge 1 Lozenge Oral every 4 hours PRN Sore Throat  dextrose Oral Gel 15 Gram(s) Oral once PRN Blood Glucose LESS THAN 70 milliGRAM(s)/deciliter  HYDROmorphone  Injectable 0.25 milliGRAM(s) IV Push every 4 hours PRN Severe Pain (7 - 10)  ondansetron Injectable 4 milliGRAM(s) IV Push every 6 hours PRN Nausea and/or Vomiting  sodium chloride 0.9% lock flush 10 milliLiter(s) IV Push every 1 hour PRN Pre/post blood products, medications, blood draw, and to maintain line patency        Vital Signs Last 24 Hrs  T(C): 38.1 (22 Mar 2023 06:00), Max: 39.1 (21 Mar 2023 22:30)  T(F): 100.6 (22 Mar 2023 06:00), Max: 102.4 (21 Mar 2023 22:30)  HR: 89 (22 Mar 2023 08:00) (82 - 108)  BP: 126/72 (22 Mar 2023 08:00) (100/59 - 150/76)  BP(mean): 93 (22 Mar 2023 08:00) (75 - 106)  RR: 19 (22 Mar 2023 08:00) (12 - 22)  SpO2: 98% (22 Mar 2023 08:00) (90% - 100%)    Parameters below as of 22 Mar 2023 08:00  Patient On (Oxygen Delivery Method): nasal cannula  O2 Flow (L/min): 2      03-21-23 @ 07:01  -  03-22-23 @ 07:00  --------------------------------------------------------  IN: 3329.4 mL / OUT: 3135 mL / NET: 194.4 mL    03-22-23 @ 07:01  -  03-22-23 @ 08:10  --------------------------------------------------------  IN: 32.7 mL / OUT: 0 mL / NET: 32.7 mL      PHYSICAL EXAM:  Constitutional: alert, NAD  Eyes: the sclera and conjunctiva were normal.   ENT: the ears and nose were normal in appearance.   Neck: the appearance of the neck was normal and the neck was supple.   Pulmonary: no respiratory distress and lungs were clear to auscultation bilaterally.   Heart: heart rate was normal and rhythm regular, normal S1 and S2  Vascular: left stump wrapped without strikethrough, WWP, no clubbing/cyanosis/edema  Abdomen: mildly tender to palpation (L>R) without guarding or rigidity, LUQ drains in place, LLQ malodorous  Neurological: no focal deficits.         LABS:                        7.3    28.90 )-----------( 650      ( 22 Mar 2023 05:30 )             22.7     03-22    135  |  103  |  12  ----------------------------<  194<H>  4.8   |  25  |  0.52    Ca    7.6<L>      22 Mar 2023 05:30  Phos  3.9     03-22  Mg     2.4     03-22      PT/INR - ( 22 Mar 2023 05:30 )   PT: 15.8 sec;   INR: 1.32          PTT - ( 22 Mar 2023 05:30 )  PTT:66.3 sec      MICROBIOLOGY:      RADIOLOGY & ADDITIONAL STUDIES:  Reviewed

## 2023-03-22 NOTE — CHART NOTE - NSCHARTNOTEFT_GEN_A_CORE
The writer met with the patient briefly for f/u individual psychotherapy. The writer provided support in response to patient challenges related to hospitalization. No SHIIPAVH reported.

## 2023-03-22 NOTE — PROGRESS NOTE ADULT - SUBJECTIVE AND OBJECTIVE BOX
ON: Fever 102.4, given tylenol. PTT 55 --> Hep gtt incr to 16.      SUBJECTIVE: Patient seen and examined bedside by SICU team.    aMIOdarone Infusion 0.5 mG/Min IV Continuous <Continuous>  caspofungin IVPB 50 milliGRAM(s) IV Intermittent every 24 hours  heparin  Infusion 1400 Unit(s)/Hr IV Continuous <Continuous>  metoprolol tartrate Injectable 5 milliGRAM(s) IV Push every 6 hours  tigecycline IVPB      tigecycline IVPB 50 milliGRAM(s) IV Intermittent every 12 hours  vancomycin  IVPB 1000 milliGRAM(s) IV Intermittent every 12 hours      Vital Signs Last 24 Hrs  T(C): 38.1 (22 Mar 2023 06:00), Max: 39.1 (21 Mar 2023 22:30)  T(F): 100.6 (22 Mar 2023 06:00), Max: 102.4 (21 Mar 2023 22:30)  HR: 89 (22 Mar 2023 05:00) (82 - 108)  BP: 126/73 (22 Mar 2023 05:00) (100/59 - 150/76)  BP(mean): 94 (22 Mar 2023 05:00) (75 - 106)  RR: 15 (22 Mar 2023 05:00) (12 - 23)  SpO2: 100% (22 Mar 2023 05:00) (90% - 100%)    Parameters below as of 22 Mar 2023 05:00  Patient On (Oxygen Delivery Method): nasal cannula  O2 Flow (L/min): 2    I&O's Detail    20 Mar 2023 07:01  -  21 Mar 2023 07:00  --------------------------------------------------------  IN:    Albumin 5%  - 250 mL: 250 mL    Amiodarone: 384.1 mL    Fat Emulsion (Fish Oil &amp; Plant Based) 20% Infusion: 249.7 mL    IV PiggyBack: 500 mL    IV PiggyBack: 600 mL    IV PiggyBack: 350 mL    TPN (Total Parenteral Nutrition): 1546 mL  Total IN: 3879.8 mL    OUT:    Bulb (mL): 650 mL    Drain (mL): 90 mL    Drain (mL): 420 mL    Fat Emulsion (Fish Oil &amp; Plant Based) 20% Infusion: 0 mL    Incontinent per Condom Catheter (mL): 425 mL    Nasogastric/Oral tube (mL): 350 mL    Voided (mL): 725 mL  Total OUT: 2660 mL    Total NET: 1219.8 mL      21 Mar 2023 07:01  -  22 Mar 2023 06:05  --------------------------------------------------------  IN:    Amiodarone: 367.4 mL    Fat Emulsion (Fish Oil &amp; Plant Based) 20% Infusion: 228.8 mL    Heparin: 298 mL    IV PiggyBack: 100 mL    IV PiggyBack: 250 mL    IV PiggyBack: 200 mL    TPN (Total Parenteral Nutrition): 1386 mL  Total IN: 2830.2 mL    OUT:    Bulb (mL): 785 mL    Drain (mL): 20 mL    Drain (mL): 155 mL    Nasogastric/Oral tube (mL): 200 mL    Voided (mL): 1175 mL  Total OUT: 2335 mL    Total NET: 495.2 mL      Physical Exam:    General: NAD  HEENT: NC/AT, dry mucus membranes. NG tube in place with dark bilious output.  Pulmonary: Nonlabored breathing, no respiratory distress, CTAB  Cardiovascular: tachycardic, no murmurs  Abdominal: Mildly tense, diffusely tender to palpation (L>R) without guarding or rigidity, ND, midline incision well healing, ostomy reversal site with wet to dry dressing. LUQ drains x2 bilious, LLQ drain feculent and malodorous  Extremities: Left stump wrapped without strikethrough, WWP, no clubbing/cyanosis/edema  Neuro: A/O x3, CNs II-XII grossly intact        LABS:                        7.4    21.26 )-----------( 590      ( 21 Mar 2023 06:06 )             22.6     03-21    136  |  104  |  15  ----------------------------<  151<H>  4.6   |  22  |  0.48<L>    Ca    7.7<L>      21 Mar 2023 06:06  Phos  4.8     03-21  Mg     2.4     03-21      PT/INR - ( 21 Mar 2023 06:06 )   PT: 17.0 sec;   INR: 1.42          PTT - ( 21 Mar 2023 22:18 )  PTT:55.8 sec      RADIOLOGY & ADDITIONAL STUDIES:   ON: Fever 102.4, given tylenol. PTT 55 --> Hep gtt incr to 16.      SUBJECTIVE: Patient seen and examined bedside by SICU team. Patient denies abdominal pain this morning, no other acute complaints. -CP/SOBN/V    aMIOdarone Infusion 0.5 mG/Min IV Continuous <Continuous>  caspofungin IVPB 50 milliGRAM(s) IV Intermittent every 24 hours  heparin  Infusion 1400 Unit(s)/Hr IV Continuous <Continuous>  metoprolol tartrate Injectable 5 milliGRAM(s) IV Push every 6 hours  tigecycline IVPB      tigecycline IVPB 50 milliGRAM(s) IV Intermittent every 12 hours  vancomycin  IVPB 1000 milliGRAM(s) IV Intermittent every 12 hours      Vital Signs Last 24 Hrs  T(C): 38.1 (22 Mar 2023 06:00), Max: 39.1 (21 Mar 2023 22:30)  T(F): 100.6 (22 Mar 2023 06:00), Max: 102.4 (21 Mar 2023 22:30)  HR: 89 (22 Mar 2023 05:00) (82 - 108)  BP: 126/73 (22 Mar 2023 05:00) (100/59 - 150/76)  BP(mean): 94 (22 Mar 2023 05:00) (75 - 106)  RR: 15 (22 Mar 2023 05:00) (12 - 23)  SpO2: 100% (22 Mar 2023 05:00) (90% - 100%)    Parameters below as of 22 Mar 2023 05:00  Patient On (Oxygen Delivery Method): nasal cannula  O2 Flow (L/min): 2    I&O's Detail    20 Mar 2023 07:01  -  21 Mar 2023 07:00  --------------------------------------------------------  IN:    Albumin 5%  - 250 mL: 250 mL    Amiodarone: 384.1 mL    Fat Emulsion (Fish Oil &amp; Plant Based) 20% Infusion: 249.7 mL    IV PiggyBack: 500 mL    IV PiggyBack: 600 mL    IV PiggyBack: 350 mL    TPN (Total Parenteral Nutrition): 1546 mL  Total IN: 3879.8 mL    OUT:    Bulb (mL): 650 mL    Drain (mL): 90 mL    Drain (mL): 420 mL    Fat Emulsion (Fish Oil &amp; Plant Based) 20% Infusion: 0 mL    Incontinent per Condom Catheter (mL): 425 mL    Nasogastric/Oral tube (mL): 350 mL    Voided (mL): 725 mL  Total OUT: 2660 mL    Total NET: 1219.8 mL      21 Mar 2023 07:01  -  22 Mar 2023 06:05  --------------------------------------------------------  IN:    Amiodarone: 367.4 mL    Fat Emulsion (Fish Oil &amp; Plant Based) 20% Infusion: 228.8 mL    Heparin: 298 mL    IV PiggyBack: 100 mL    IV PiggyBack: 250 mL    IV PiggyBack: 200 mL    TPN (Total Parenteral Nutrition): 1386 mL  Total IN: 2830.2 mL    OUT:    Bulb (mL): 785 mL    Drain (mL): 20 mL    Drain (mL): 155 mL    Nasogastric/Oral tube (mL): 200 mL    Voided (mL): 1175 mL  Total OUT: 2335 mL    Total NET: 495.2 mL      Physical Exam:    General: NAD  HEENT: NC/AT, dry mucus membranes. NG tube in place with dark bilious output.  Pulmonary: Nonlabored breathing, no respiratory distress, CTAB  Cardiovascular: tachycardic, no murmurs  Abdominal: Mildly tense, diffusely tender to palpation (L>R) without guarding or rigidity, ND, midline incision well healing, ostomy reversal site with wet to dry dressing. LUQ drains x2 bilious, LLQ drain purulent  Extremities: Left stump c/d/i, WWP, no clubbing/cyanosis/edema  Neuro: A/O x3, CNs II-XII grossly intact        LABS:                        7.4    21.26 )-----------( 590      ( 21 Mar 2023 06:06 )             22.6     03-21    136  |  104  |  15  ----------------------------<  151<H>  4.6   |  22  |  0.48<L>    Ca    7.7<L>      21 Mar 2023 06:06  Phos  4.8     03-21  Mg     2.4     03-21      PT/INR - ( 21 Mar 2023 06:06 )   PT: 17.0 sec;   INR: 1.42          PTT - ( 21 Mar 2023 22:18 )  PTT:55.8 sec      RADIOLOGY & ADDITIONAL STUDIES:   ON: Fever 102.4, given tylenol. PTT 55 --> Hep gtt incr to 16.      SUBJECTIVE: Patient seen and examined bedside by SICU team. Patient denies abdominal pain this morning, no other acute complaints. -CP/SOBN/V    aMIOdarone Infusion 0.5 mG/Min IV Continuous <Continuous>  caspofungin IVPB 50 milliGRAM(s) IV Intermittent every 24 hours  heparin  Infusion 1400 Unit(s)/Hr IV Continuous <Continuous>  metoprolol tartrate Injectable 5 milliGRAM(s) IV Push every 6 hours  tigecycline IVPB      tigecycline IVPB 50 milliGRAM(s) IV Intermittent every 12 hours  vancomycin  IVPB 1000 milliGRAM(s) IV Intermittent every 12 hours      Vital Signs Last 24 Hrs  T(C): 38.1 (22 Mar 2023 06:00), Max: 39.1 (21 Mar 2023 22:30)  T(F): 100.6 (22 Mar 2023 06:00), Max: 102.4 (21 Mar 2023 22:30)  HR: 89 (22 Mar 2023 05:00) (82 - 108)  BP: 126/73 (22 Mar 2023 05:00) (100/59 - 150/76)  BP(mean): 94 (22 Mar 2023 05:00) (75 - 106)  RR: 15 (22 Mar 2023 05:00) (12 - 23)  SpO2: 100% (22 Mar 2023 05:00) (90% - 100%)    Parameters below as of 22 Mar 2023 05:00  Patient On (Oxygen Delivery Method): nasal cannula  O2 Flow (L/min): 2    I&O's Detail    20 Mar 2023 07:01  -  21 Mar 2023 07:00  --------------------------------------------------------  IN:    Albumin 5%  - 250 mL: 250 mL    Amiodarone: 384.1 mL    Fat Emulsion (Fish Oil &amp; Plant Based) 20% Infusion: 249.7 mL    IV PiggyBack: 500 mL    IV PiggyBack: 600 mL    IV PiggyBack: 350 mL    TPN (Total Parenteral Nutrition): 1546 mL  Total IN: 3879.8 mL    OUT:    Bulb (mL): 650 mL    Drain (mL): 90 mL    Drain (mL): 420 mL    Fat Emulsion (Fish Oil &amp; Plant Based) 20% Infusion: 0 mL    Incontinent per Condom Catheter (mL): 425 mL    Nasogastric/Oral tube (mL): 350 mL    Voided (mL): 725 mL  Total OUT: 2660 mL    Total NET: 1219.8 mL      21 Mar 2023 07:01  -  22 Mar 2023 06:05  --------------------------------------------------------  IN:    Amiodarone: 367.4 mL    Fat Emulsion (Fish Oil &amp; Plant Based) 20% Infusion: 228.8 mL    Heparin: 298 mL    IV PiggyBack: 100 mL    IV PiggyBack: 250 mL    IV PiggyBack: 200 mL    TPN (Total Parenteral Nutrition): 1386 mL  Total IN: 2830.2 mL    OUT:    Bulb (mL): 785 mL    Drain (mL): 20 mL    Drain (mL): 155 mL    Nasogastric/Oral tube (mL): 200 mL    Voided (mL): 1175 mL  Total OUT: 2335 mL    Total NET: 495.2 mL      Physical Exam:    General: NAD  HEENT: NC/AT, dry mucus membranes. NG tube in place with dark bilious output.  Pulmonary: Nonlabored breathing, no respiratory distress, CTAB  Cardiovascular: tachycardic, no murmurs  Abdominal: Mildly tense but improved from yesterday, non-tender without guarding or rigidity, ND, midline incision well healing, ostomy reversal site with wet to dry dressing. LUQ drains x2 bilious, LLQ drain purulent  Extremities: Left stump c/d/i, WWP, no clubbing/cyanosis/edema  Neuro: A/O x3, CNs II-XII grossly intact        LABS:                        7.4    21.26 )-----------( 590      ( 21 Mar 2023 06:06 )             22.6     03-21    136  |  104  |  15  ----------------------------<  151<H>  4.6   |  22  |  0.48<L>    Ca    7.7<L>      21 Mar 2023 06:06  Phos  4.8     03-21  Mg     2.4     03-21      PT/INR - ( 21 Mar 2023 06:06 )   PT: 17.0 sec;   INR: 1.42          PTT - ( 21 Mar 2023 22:18 )  PTT:55.8 sec      RADIOLOGY & ADDITIONAL STUDIES:   ON: Fever 102.4, given tylenol. PTT 55 --> Hep gtt incr to 16.      SUBJECTIVE: Patient seen and examined bedside by SICU team. Patient denies abdominal pain this morning, no other acute complaints. -CP/SOB/N/V    aMIOdarone Infusion 0.5 mG/Min IV Continuous <Continuous>  caspofungin IVPB 50 milliGRAM(s) IV Intermittent every 24 hours  heparin  Infusion 1400 Unit(s)/Hr IV Continuous <Continuous>  metoprolol tartrate Injectable 5 milliGRAM(s) IV Push every 6 hours  tigecycline IVPB      tigecycline IVPB 50 milliGRAM(s) IV Intermittent every 12 hours  vancomycin  IVPB 1000 milliGRAM(s) IV Intermittent every 12 hours      Vital Signs Last 24 Hrs  T(C): 38.1 (22 Mar 2023 06:00), Max: 39.1 (21 Mar 2023 22:30)  T(F): 100.6 (22 Mar 2023 06:00), Max: 102.4 (21 Mar 2023 22:30)  HR: 89 (22 Mar 2023 05:00) (82 - 108)  BP: 126/73 (22 Mar 2023 05:00) (100/59 - 150/76)  BP(mean): 94 (22 Mar 2023 05:00) (75 - 106)  RR: 15 (22 Mar 2023 05:00) (12 - 23)  SpO2: 100% (22 Mar 2023 05:00) (90% - 100%)    Parameters below as of 22 Mar 2023 05:00  Patient On (Oxygen Delivery Method): nasal cannula  O2 Flow (L/min): 2    I&O's Detail    20 Mar 2023 07:01  -  21 Mar 2023 07:00  --------------------------------------------------------  IN:    Albumin 5%  - 250 mL: 250 mL    Amiodarone: 384.1 mL    Fat Emulsion (Fish Oil &amp; Plant Based) 20% Infusion: 249.7 mL    IV PiggyBack: 500 mL    IV PiggyBack: 600 mL    IV PiggyBack: 350 mL    TPN (Total Parenteral Nutrition): 1546 mL  Total IN: 3879.8 mL    OUT:    Bulb (mL): 650 mL    Drain (mL): 90 mL    Drain (mL): 420 mL    Fat Emulsion (Fish Oil &amp; Plant Based) 20% Infusion: 0 mL    Incontinent per Condom Catheter (mL): 425 mL    Nasogastric/Oral tube (mL): 350 mL    Voided (mL): 725 mL  Total OUT: 2660 mL    Total NET: 1219.8 mL      21 Mar 2023 07:01  -  22 Mar 2023 06:05  --------------------------------------------------------  IN:    Amiodarone: 367.4 mL    Fat Emulsion (Fish Oil &amp; Plant Based) 20% Infusion: 228.8 mL    Heparin: 298 mL    IV PiggyBack: 100 mL    IV PiggyBack: 250 mL    IV PiggyBack: 200 mL    TPN (Total Parenteral Nutrition): 1386 mL  Total IN: 2830.2 mL    OUT:    Bulb (mL): 785 mL    Drain (mL): 20 mL    Drain (mL): 155 mL    Nasogastric/Oral tube (mL): 200 mL    Voided (mL): 1175 mL  Total OUT: 2335 mL    Total NET: 495.2 mL      Physical Exam:    General: NAD  HEENT: NC/AT, dry mucus membranes. NG tube in place with dark bilious output.  Pulmonary: Nonlabored breathing, no respiratory distress, CTAB  Cardiovascular: tachycardic, no murmurs  Abdominal: Mildly tense but improved from yesterday, non-tender without guarding or rigidity, ND, midline incision well healing, ostomy reversal site with wet to dry dressing. LUQ drains x2 bilious, LLQ drain purulent  Extremities: Left stump c/d/i, WWP, no clubbing/cyanosis/edema  Neuro: A/O x3, CNs II-XII grossly intact        LABS:                        7.4    21.26 )-----------( 590      ( 21 Mar 2023 06:06 )             22.6     03-21    136  |  104  |  15  ----------------------------<  151<H>  4.6   |  22  |  0.48<L>    Ca    7.7<L>      21 Mar 2023 06:06  Phos  4.8     03-21  Mg     2.4     03-21      PT/INR - ( 21 Mar 2023 06:06 )   PT: 17.0 sec;   INR: 1.42          PTT - ( 21 Mar 2023 22:18 )  PTT:55.8 sec      RADIOLOGY & ADDITIONAL STUDIES:

## 2023-03-22 NOTE — PROGRESS NOTE ADULT - ASSESSMENT
denice pt needs refill on Cholestyramine, pt would like to know if dr Naseem Núñez can up the dose  Please send to [address] [facility] in [place]   Please advise pt when it has been sent 77M PMHx of IVDU found unresponsive at his nursing home, resolved after Narcan in the field and brought to [facility]. Found to have PE, atrial flutter, and large LV thrombus on echo. Transferred to [facility] for further management. Pt c/o abdominal pain on 12/10 CTA showing mid SMA with embolus. Abnormal distal small bowel loops and cecum with dilatation and pneumatosis suggesting infarcted bowel. Now s/p Ex lap, SMA embolectomy, 80cm SBR, abthera vac left in discontinuity (12/11). S/p second look (12/13), s/p OR for 3rd look, end-to-end anastomosis of remaining bowel, loop ileostomy and abdomen closure (12/15). S/p LORENZO and Cardioversion on 12/30 with cardiology. Patient was nutritionally optimized. 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 s/p L AKA closure (3/1), s/p ileostomy reversal (3/7). Patient with noted coffee gorund/dark red drainage from NGT, Hgb found to be 4 and 5, upgraded to SICU for HD monitoring. Passing flatus, NGT output now clear from coffee ground 03/16. CT A/P 03/19 showing increased abdominal collection over LUQ.    Plan:    NPO  IVF  NGT to LIWS  Monitor drain output  Cont abx  F/u ID recs  Rest of care per SICU  Surgery Team 4C will continue to follow. Please page Team 4 with questions/clinical changes. [phone]

## 2023-03-23 NOTE — GOALS OF CARE CONVERSATION - ADVANCED CARE PLANNING - CONVERSATION DETAILS
Met with patient for ongoing support and GOC. Discussed interval developments. WC continues to rise and he continues to spike fevers from his progressive infection that has not improved with IR drainage and aggressive management. Reviewed his discussion with ID earlier today; pt understands that there are no viable treatment/antimicrobial options for his infection, so abx were discontinued. He understands that without treatment of his infection his prognosis is likely days/week. Pt asked about the dying process, which we discussed. Emphasized/ensured  that he will be comfortable. Extensive support provided.     Left voicemail for his sister/HCP Sophie to arrange meeting tomorrow to further discuss transition to symptom directed care and hospice insurance benefit.
Chart reviewed. Met with pt and family at bedside. Briefly reviewed hospital course including SBR and ileostomy placement. Emphasized recent developments: Pt continues to have high output of ileostomy despite aggressive medical management; pt wishing to return to OR to reverse ostomy. In addition, pt with persistent fevers that are being attributed to intra abdominal fluid collection.  Explained that the location of the fluid collection precludes it from IR drainage and that he would need to return to OR for further mgmt.  Reviewed that pt is on appetite stimulant and his nutrition is being supplemented with TPN, however pts poor nutritional status remains a barrier for pt to return to OR.    Pt restated his wishes to go to OR for above interventions. Re-iterated importance of optimizing nutrition in the next week in order for pt to return to OR. He appears to be motivated to increase PO intake. Questions answered, support provided.     Note: If pt is unable to be nutritionally optimized for OR, concerned that pt will continue to speak fevers given lack of source control. Appreciate ID recs and feedback on utility/futility of prolonged course of abx. If C change to symptom directed care, pts insurance would allow transfer to [facility] for EOL care.       In addition to the E/M visit, an advance care planning meeting was performed. Start time:100 ; End time:145 ; Total time:45 min. A face to face meeting to discuss advance care planning was held today regarding: OLIVIA MAYERS. Primary decision maker:  Patient is unable to participate in decision making;  Alternate/surrogate: Sophie Ash (Sister) [phone] (cell)  [phone]   . Discussed advance directives including, but not limited to, healthcare proxy and code status, as well as disease trajectory, patient's values/goals, and health care options that are available for end of life care. Decision regarding code status: DNR/DNI; Documentation completed today: [facility] note

## 2023-03-23 NOTE — PROGRESS NOTE ADULT - ATTENDING COMMENTS
Patient seen and examined with house-staff during bedside rounds.  Resident note read, including vitals, physical findings, laboratory data, and radiological reports.   Revisions included below.  Direct personal management at bed side and extensive interpretation of the data.  Plan was outlined and discussed in details with the housestaff.  Decision making of high complexity  Action taken for acute disease activity to reflect the level of care provided:  - medication reconciliation  - review laboratory data Patient seen and examined with house-staff during bedside rounds.  Resident note read, including vitals, physical findings, laboratory data, and radiological reports.   Revisions included below.  Direct personal management at bed side and extensive interpretation of the data.  Plan was outlined and discussed in details with the housestaff.  Decision making of high complexity  Action taken for acute disease activity to reflect the level of care provided:  - medication reconciliation  - review laboratory data  Patient is awake.  Dysphagia evaluation.  As per ID all antibiotics DC'd due to resistant bug.  Continue drainage.  Suggestion to follow-up with palliative.  Will discuss with surgery.  Creatinine is stable

## 2023-03-23 NOTE — PROGRESS NOTE ADULT - TIME-BASED
25
35
25
25
35
40
50
25
25
35
38
40
50
55
25
35
40
45
50
25
50
55
35
50
55
55
25
35
35

## 2023-03-23 NOTE — GOALS OF CARE CONVERSATION - ADVANCED CARE PLANNING - NS PRO AD PATIENT TYPE
Health Care Proxy (HCP)/Do Not Resuscitate (DNR)/Medical Orders for Life-Sustaining Treatment (MOLST)
Health Care Proxy (HCP)/Do Not Resuscitate (DNR)

## 2023-03-23 NOTE — PROGRESS NOTE ADULT - ASSESSMENT
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  Patient afebrile since drainage.  Fluid cultures from OR with Klebsiella suggesting this is the source of the bacteremia. Now culture with Candida albicans and still febrile with leukocytosis. Abdominal fluid collection not drained. Now with GIB at risk for breakthrough bacteremia. Still with intermittent fevers. CTAP showed increased ascites, enlarged peritoneal fluid, gas collections, new LUQ fluid collections c/f peritonitis & abscesses. S/p IR procedure with multiple catheter placement. Body fluid cultures with MDR Klebsiella pneumoniae only susceptible to aminoglycosides. Given primary source likely to be intraabdominal infection, not amenable to aminoglycosides i/s/o degradation 2/2 decreased pH & tigecycline Etest shows resistance.   - discontinue ABx  - given lack of viable ABx options continuation is futile, would suggest palliative reconsultation, hospice, +/- comfort measures     ID team 1 to sign off

## 2023-03-23 NOTE — PROGRESS NOTE ADULT - SUBJECTIVE AND OBJECTIVE BOX
ON: PTT 80. Febrile Tmax 101.3.      SUBJECTIVE: Patient seen and examined bedside by SICU team. Patient reports pain is well controlled. Denies any acute complaints. -CP/SOB/N/V    aMIOdarone Infusion 0.5 mG/Min IV Continuous <Continuous>  caspofungin IVPB 50 milliGRAM(s) IV Intermittent every 24 hours  DAPTOmycin IVPB      DAPTOmycin IVPB 400 milliGRAM(s) IV Intermittent every 24 hours  heparin  Infusion 1400 Unit(s)/Hr IV Continuous <Continuous>  metoprolol tartrate Injectable 5 milliGRAM(s) IV Push every 6 hours  tigecycline IVPB      tigecycline IVPB 50 milliGRAM(s) IV Intermittent every 12 hours      Vital Signs Last 24 Hrs  T(C): 37.7 (23 Mar 2023 05:06), Max: 38.9 (22 Mar 2023 14:35)  T(F): 99.9 (23 Mar 2023 05:06), Max: 102 (22 Mar 2023 14:35)  HR: 85 (23 Mar 2023 06:00) (82 - 102)  BP: 138/74 (23 Mar 2023 06:00) (100/69 - 139/74)  BP(mean): 99 (23 Mar 2023 06:00) (81 - 100)  RR: 14 (23 Mar 2023 06:00) (11 - 21)  SpO2: 94% (23 Mar 2023 06:00) (94% - 100%)    Parameters below as of 23 Mar 2023 06:00  Patient On (Oxygen Delivery Method): room air      I&O's Detail    21 Mar 2023 07:01  -  22 Mar 2023 07:00  --------------------------------------------------------  IN:    Amiodarone: 400.8 mL    Fat Emulsion (Fish Oil &amp; Plant Based) 20% Infusion: 249.6 mL    Heparin: 330 mL    IV PiggyBack: 250 mL    IV PiggyBack: 250 mL    IV PiggyBack: 200 mL    IV PiggyBack: 200 mL    TPN (Total Parenteral Nutrition): 1449 mL  Total IN: 3329.4 mL    OUT:    Bulb (mL): 875 mL    Drain (mL): 20 mL    Drain (mL): 215 mL    Nasogastric/Oral tube (mL): 300 mL    Voided (mL): 1725 mL  Total OUT: 3135 mL    Total NET: 194.4 mL      22 Mar 2023 07:01  -  23 Mar 2023 06:57  --------------------------------------------------------  IN:    Amiodarone: 384.1 mL    Fat Emulsion (Fish Oil &amp; Plant Based) 20% Infusion: 291.2 mL    Heparin: 368 mL    IV PiggyBack: 100 mL    IV PiggyBack: 50 mL    IV PiggyBack: 250 mL    IV PiggyBack: 200 mL    TPN (Total Parenteral Nutrition): 1449 mL  Total IN: 3092.3 mL    OUT:    Bulb (mL): 360 mL    Drain (mL): 170 mL    Drain (mL): 170 mL    Nasogastric/Oral tube (mL): 150 mL    Voided (mL): 2950 mL  Total OUT: 3800 mL    Total NET: -707.7 mL        Physical Exam:    General: NAD  HEENT: NC/AT, dry mucus membranes. NG tube in place with dark bilious output.  Pulmonary: Nonlabored breathing, no respiratory distress, CTAB  Cardiovascular: tachycardic, no murmurs  Abdominal: Mildly tense but improved from yesterday, non-tender without guarding or rigidity, ND, midline incision well healing, ostomy reversal site with wet to dry dressing. LUQ drains x2 bilious, LLQ drain purulent  Extremities: Left stump c/d/i, WWP, no clubbing/cyanosis/edema  Neuro: A/O x3, CNs II-XII grossly intact      LABS:                        7.2    29.57 )-----------( 694      ( 23 Mar 2023 05:30 )             23.1     03-23    135  |  102  |  12  ----------------------------<  177<H>  4.5   |  25  |  0.49<L>    Ca    7.5<L>      23 Mar 2023 05:30  Phos  3.4     03-23  Mg     2.2     03-23      PT/INR - ( 22 Mar 2023 05:30 )   PT: 15.8 sec;   INR: 1.32          PTT - ( 23 Mar 2023 05:30 )  PTT:85.3 sec      RADIOLOGY & ADDITIONAL STUDIES:

## 2023-03-23 NOTE — PROGRESS NOTE ADULT - ASSESSMENT
77M PMHx of IVDU found unresponsive at his nursing home, resolved after Narcan in the field and brought to [facility]. Found to have PE, atrial flutter, and large LV thrombus on echo. Transferred to [facility] for further management. Pt c/o abdominal pain on 12/10 CTA showing mid SMA with embolus. Abnormal distal small bowel loops and cecum with dilatation and pneumatosis suggesting infarcted bowel. Now s/p Ex lap, SMA embolectomy, 80cm SBR, abthera vac left in discontinuity (12/11). S/p second look (12/13), s/p OR for 3rd look, end-to-end anastomosis of remaining bowel, loop ileostomy and abdomen closure (12/15). S/p LORENZO and Cardioversion on 12/30 with cardiology. Patient was nutritionally optimized. 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 s/p L AKA closure (3/1), s/p ileostomy reversal (3/7) c/b ileus. Course c/b upper GI bleeding 3/13 w/ coffee ground emesis (resolved), sepsis 2/2 intra-abdominal abscesses. Now s/p IR drainage w/ 2 drains in the LUQ and 1 in the pelvis w/ 750 cc total output (3/20).    Neuro: Pain: LidoDerm, Tylenol PRN, Dilaudid PRN. Nausea: Zofran PRN. Depression/Appetite: Mirtazapine.   ENT: Cepacol for sore throat  CV: Tachycardic 2/2 sepsis. Repeat echo with EF (02/11) 55-60%. Mildly dilated RV. Maintain MAP >65. A.Fib/Flutter: s/p cardioversion on 12/30. Cont metoprolol and IV amiodarone; hx HTN: holding spironolactone, Entresto.  Pulm: Comfortable on RA  GI/FEN: s/p ostomy reversal (3/7). Upper GI bleed (resolved): on PPI BID. SBO/ileus: NGT to LIWS, NPO. PICC w TPN. VitC, MTV, Zinc, Octreotide in TPN.  : Voiding, Strict I and O.   Endo: mISS  Heme: LV thrombus w/ LLE ischemia: Heparin gtt held after GI bleed, resumed (3/17-3/20, 3/21-). Active T&S.  ID: Sepsis 2/2 intra-abdominal abscesses, s/p IR drains x3 (3/20) growing ESBL Klebsiella pneumoniae, staph haemolyticus, VRE faecium, parabacteroides goldsteinii. LLE ischemia s/p L AKA 2/15: tigecycline (3/16-), Caspo (3/14-), Daptomycin 2/11-3/9; 3/10-16, 3/22-) // DC: Zosyn, Fluconazole (3/9-13), Erta (2/13-3/10; 3/13), Tobra (3/13-16), Luis (2/10-12, 3/13-16), Vancomycin (2/10-2/11, 3/20-22). Weekly CK monitoring on Daptomycin    PPX: SCDs  L/D/T: PIV, LUQ drains x2, LLQ drain  PT/OT: Not ordered  Dispo: SICU   77M PMHx of IVDU found unresponsive at his nursing home, resolved after Narcan in the field and brought to [facility]. Found to have PE, atrial flutter, and large LV thrombus on echo. Transferred to [facility] for further management. Pt c/o abdominal pain on 12/10 CTA showing mid SMA with embolus. Abnormal distal small bowel loops and cecum with dilatation and pneumatosis suggesting infarcted bowel. Now s/p Ex lap, SMA embolectomy, 80cm SBR, abthera vac left in discontinuity (12/11). S/p second look (12/13), s/p OR for 3rd look, end-to-end anastomosis of remaining bowel, loop ileostomy and abdomen closure (12/15). S/p LORENZO and Cardioversion on 12/30 with cardiology. Patient was nutritionally optimized. 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 s/p L AKA closure (3/1), s/p ileostomy reversal (3/7) c/b ileus. Course c/b upper GI bleeding 3/13 w/ coffee ground emesis (resolved), sepsis 2/2 intra-abdominal abscesses. Now s/p IR drainage w/ 2 drains in the LUQ and 1 in the pelvis w/ 750 cc total output (3/20).    Neuro: Pain: LidoDerm, Tylenol PRN, Dilaudid PRN. Nausea: Zofran PRN. Depression/Appetite: Mirtazapine.   ENT: Cepacol for sore throat  CV: Euvolemic this morning. Tachycardic 2/2 sepsis, improving. Repeat echo with EF (02/11) 55-60%. Mildly dilated RV. Maintain MAP >65. A.Fib/Flutter: s/p cardioversion on 12/30. Cont metoprolol and IV amiodarone; hx HTN: holding spironolactone, Entresto.  Pulm: Comfortable on RA  GI/FEN: s/p ostomy reversal (3/7). Upper GI bleed (resolved): on PPI BID. SBO/ileus: NGT to LIWS, NPO. PICC w TPN. VitC, MTV, Zinc, Octreotide in TPN.  : Voiding, Strict I and O.   Endo: mISS  Heme: LV thrombus w/ LLE ischemia: Heparin gtt held after GI bleed, resumed (3/17-3/20, 3/21-). Active T&S.  ID: Sepsis 2/2 intra-abdominal abscesses, s/p IR drains x3 (3/20) growing ESBL Klebsiella pneumoniae, staph haemolyticus, VRE faecium, parabacteroides goldsteinii. Leukocytosis remains elevated, fever curve improving. LLE ischemia s/p L AKA 2/15: tigecycline (3/16-), Caspo (3/14-), Daptomycin 2/11-3/9; 3/10-16, 3/22-) // DC: Zosyn, Fluconazole (3/9-13), Erta (2/13-3/10; 3/13), Tobra (3/13-16), Luis (2/10-12, 3/13-16), Vancomycin (2/10-2/11, 3/20-22). Weekly CK monitoring on Daptomycin    PPX: SCDs  L/D/T: PIV, LUQ drains x2, LLQ drain  PT/OT: Not ordered  Dispo: SICU   77M PMHx of IVDU found unresponsive at his nursing home, resolved after Narcan in the field and brought to [facility]. Found to have PE, atrial flutter, and large LV thrombus on echo. Transferred to [facility] for further management. Pt c/o abdominal pain on 12/10 CTA showing mid SMA with embolus. Abnormal distal small bowel loops and cecum with dilatation and pneumatosis suggesting infarcted bowel. Now s/p Ex lap, SMA embolectomy, 80cm SBR, abthera vac left in discontinuity (12/11). S/p second look (12/13), s/p OR for 3rd look, end-to-end anastomosis of remaining bowel, loop ileostomy and abdomen closure (12/15). S/p LORENZO and Cardioversion on 12/30 with cardiology. Patient was nutritionally optimized. 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 s/p L AKA closure (3/1), s/p ileostomy reversal (3/7) c/b ileus. Course c/b upper GI bleeding 3/13 w/ coffee ground emesis (resolved), sepsis 2/2 intra-abdominal abscesses. Now s/p IR drainage w/ 2 drains in the LUQ and 1 in the pelvis w/ 750 cc total output (3/20).    Neuro: Pain: LidoDerm, Tylenol PRN, Dilaudid PRN. Nausea: Zofran PRN. Depression/Appetite: Mirtazapine.   ENT: Cepacol for sore throat  CV: Euvolemic this morning. Tachycardic 2/2 sepsis, improving. Repeat echo with EF (02/11) 55-60%. Mildly dilated RV. Maintain MAP >65. A.Fib/Flutter: s/p cardioversion on 12/30. Cont metoprolol and IV amiodarone; hx HTN: holding spironolactone, Entresto.  Pulm: Comfortable on RA  GI/FEN: s/p ostomy reversal (3/7). Upper GI bleed (resolved): on PPI BID. SBO/ileus: NGT to LIWS, NPO. PICC w TPN. VitC, MTV, Zinc, Octreotide in TPN.  : Voiding, Strict I and O.   Endo: mISS  Heme: LV thrombus w/ LLE ischemia: Heparin gtt held after GI bleed, resumed (3/17-3/20, 3/21-). Active T&S.  ID: Sepsis 2/2 intra-abdominal abscesses, s/p IR drains x3 (3/20) growing MDR Klebsiella pneumoniae, staph haemolyticus, VRE faecium, parabacteroides goldsteinii. Leukocytosis remains elevated, fever curve improving. LLE ischemia s/p L AKA 2/15: tigecycline (3/16-), Caspo (3/14-), Daptomycin 2/11-3/9; 3/10-16, 3/22-) // DC: Zosyn, Fluconazole (3/9-13), Erta (2/13-3/10; 3/13), Tobra (3/13-16), Luis (2/10-12, 3/13-16), Vancomycin (2/10-2/11, 3/20-22). Weekly CK monitoring on Daptomycin.    PPX: SCDs  L/D/T: PIV, LUQ drains x2, LLQ drain  PT/OT: Not ordered  Dispo: SICU   77M PMHx of IVDU found unresponsive at his nursing home, resolved after Narcan in the field and brought to [facility]. Found to have PE, atrial flutter, and large LV thrombus on echo. Transferred to [facility] for further management. Pt c/o abdominal pain on 12/10 CTA showing mid SMA with embolus. Abnormal distal small bowel loops and cecum with dilatation and pneumatosis suggesting infarcted bowel. Now s/p Ex lap, SMA embolectomy, 80cm SBR, abthera vac left in discontinuity (12/11). S/p second look (12/13), s/p OR for 3rd look, end-to-end anastomosis of remaining bowel, loop ileostomy and abdomen closure (12/15). S/p LORENZO and Cardioversion on 12/30 with cardiology. Patient was nutritionally optimized. 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 s/p L AKA closure (3/1), s/p ileostomy reversal (3/7) c/b ileus. Course c/b upper GI bleeding 3/13 w/ coffee ground emesis (resolved), sepsis 2/2 intra-abdominal abscesses. Now s/p IR drainage w/ 2 drains in the LUQ and 1 in the pelvis w/ 750 cc total output (3/20).    Neuro: Pain: LidoDerm, Tylenol PRN, Dilaudid PRN. Nausea: Zofran PRN. Depression/Appetite: Mirtazapine.   ENT: Cepacol for sore throat  CV: Euvolemic this morning. Tachycardic 2/2 sepsis, improving. Repeat echo with EF (02/11) 55-60%. Mildly dilated RV. Maintain MAP >65. A.Fib/Flutter: s/p cardioversion on 12/30. Cont metoprolol and IV amiodarone; hx HTN: holding spironolactone, Entresto.  Pulm: Comfortable on RA  GI/FEN: s/p ostomy reversal (3/7). Upper GI bleed (resolved): on PPI BID. SBO/ileus: NGT to LIWS, NPO. PICC w TPN. VitC, MTV, Zinc, Octreotide in TPN.  : Voiding, Strict I and O.   Endo: mISS  Heme: LV thrombus w/ LLE ischemia: Heparin gtt held after GI bleed, resumed (3/17-3/20, 3/21-). Active T&S.  ID: Sepsis 2/2 intra-abdominal abscesses, s/p IR drains x3 (3/20) growing MDR Klebsiella pneumoniae, staph haemolyticus, VRE faecium, parabacteroides goldsteinii. Leukocytosis remains elevated, fever curve improving. ID recommends discontinuing all antibiotics due to futility, given multi-drug resistance and lack of viable options. LLE ischemia s/p L AKA 2/15: tigecycline (3/16-), Caspo (3/14-), Daptomycin 2/11-3/9; 3/10-16, 3/22-) // DC: Zosyn, Fluconazole (3/9-13), Erta (2/13-3/10; 3/13), Tobra (3/13-16), Luis (2/10-12, 3/13-16), Vancomycin (2/10-2/11, 3/20-22). Weekly CK monitoring on Daptomycin.    PPX: SCDs  L/D/T: PIV, LUQ drains x2, LLQ drain  PT/OT: Not ordered  Dispo: SICU

## 2023-03-23 NOTE — PROVIDER CONTACT NOTE (OTHER) - SITUATION
Patient's Temperature 101.3 F via rectal probe. Dr. Steel made aware. Will order tylenol IV as per Dr. Steel.

## 2023-03-23 NOTE — PROGRESS NOTE ADULT - TIME-BASED BILLING (NON-CRITICAL CARE)
Time-based billing (NON-critical care)

## 2023-03-23 NOTE — PROGRESS NOTE ADULT - SUBJECTIVE AND OBJECTIVE BOX
ON: PTT 80. Febrile Tmax 101.3.   3/22: PTT 66, cont @16, Vanc 8, received 1000 Vanc in AM then DC Vanc. no repeat bl cx TPN reordered. Noon PTT 64, OR Cx: IR Cx Klebsiella pneumoniae, staph haemolyticus, e faecium, parabacteroides goldsteinii. Dapto 400 started per ID, CK 41. Tmax 102, ofirmev.     SUBJECTIVE: Patient seen and examined bedside; sleepy, did not want to engage in conversation, would like to be left alone.    aMIOdarone Infusion 0.5 mG/Min IV Continuous <Continuous>  caspofungin IVPB 50 milliGRAM(s) IV Intermittent every 24 hours  DAPTOmycin IVPB      DAPTOmycin IVPB 400 milliGRAM(s) IV Intermittent every 24 hours  heparin  Infusion 1400 Unit(s)/Hr IV Continuous <Continuous>  metoprolol tartrate Injectable 5 milliGRAM(s) IV Push every 6 hours  tigecycline IVPB      tigecycline IVPB 50 milliGRAM(s) IV Intermittent every 12 hours      Vital Signs Last 24 Hrs  T(C): 37.7 (23 Mar 2023 05:06), Max: 38.9 (22 Mar 2023 14:35)  T(F): 99.9 (23 Mar 2023 05:06), Max: 102 (22 Mar 2023 14:35)  HR: 85 (23 Mar 2023 06:00) (82 - 102)  BP: 138/74 (23 Mar 2023 06:00) (100/69 - 139/74)  BP(mean): 99 (23 Mar 2023 06:00) (81 - 100)  RR: 14 (23 Mar 2023 06:00) (11 - 21)  SpO2: 94% (23 Mar 2023 06:00) (94% - 100%)    Parameters below as of 23 Mar 2023 06:00  Patient On (Oxygen Delivery Method): room air      I&O's Detail    21 Mar 2023 07:01  -  22 Mar 2023 07:00  --------------------------------------------------------  IN:    Amiodarone: 400.8 mL    Fat Emulsion (Fish Oil &amp; Plant Based) 20% Infusion: 249.6 mL    Heparin: 330 mL    IV PiggyBack: 250 mL    IV PiggyBack: 250 mL    IV PiggyBack: 200 mL    IV PiggyBack: 200 mL    TPN (Total Parenteral Nutrition): 1449 mL  Total IN: 3329.4 mL    OUT:    Bulb (mL): 875 mL    Drain (mL): 20 mL    Drain (mL): 215 mL    Nasogastric/Oral tube (mL): 300 mL    Voided (mL): 1725 mL  Total OUT: 3135 mL    Total NET: 194.4 mL      22 Mar 2023 07:01  -  23 Mar 2023 06:37  --------------------------------------------------------  IN:    Amiodarone: 384.1 mL    Fat Emulsion (Fish Oil &amp; Plant Based) 20% Infusion: 291.2 mL    Heparin: 368 mL    IV PiggyBack: 100 mL    IV PiggyBack: 50 mL    IV PiggyBack: 250 mL    IV PiggyBack: 200 mL    TPN (Total Parenteral Nutrition): 1449 mL  Total IN: 3092.3 mL    OUT:    Bulb (mL): 360 mL    Drain (mL): 170 mL    Drain (mL): 170 mL    Nasogastric/Oral tube (mL): 150 mL    Voided (mL): 2950 mL  Total OUT: 3800 mL    Total NET: -707.7 mL      PE:    General: NAD, resting comfortably in bed  HEENT: NGT appropriately positioned draining clear brown fluid  C/V: S1 s2, NSR  Pulm: Nonlabored breathing, no respiratory distress  Abd: Soft, NTND, LUQ drains with clear fluid, pelvic OLGA with purulent fluid inside  Extrem: P        LABS:                        7.2    29.57 )-----------( 694      ( 23 Mar 2023 05:30 )             23.1     03-23    135  |  102  |  12  ----------------------------<  177<H>  4.5   |  25  |  0.49<L>    Ca    7.5<L>      23 Mar 2023 05:30  Phos  3.4     03-23  Mg     2.2     03-23      PT/INR - ( 22 Mar 2023 05:30 )   PT: 15.8 sec;   INR: 1.32          PTT - ( 23 Mar 2023 05:30 )  PTT:85.3 sec      RADIOLOGY & ADDITIONAL STUDIES:

## 2023-03-23 NOTE — PROGRESS NOTE ADULT - SUBJECTIVE AND OBJECTIVE BOX
*************** INCOMPLETE *****************    INFECTIOUS DISEASES CONSULT FOLLOW-UP NOTE    INTERVAL HPI/OVERNIGHT EVENTS:      ROS:   Constitutional, eyes, ENT, cardiovascular, respiratory, gastrointestinal, genitourinary, integumentary, neurological, psychiatric and heme/lymph are otherwise negative other than noted above       ANTIBIOTICS/RELEVANT:    MEDICATIONS  (STANDING):  aMIOdarone Infusion 0.5 mG/Min (16.7 mL/Hr) IV Continuous <Continuous>  caspofungin IVPB 50 milliGRAM(s) IV Intermittent every 24 hours  chlorhexidine 2% Cloths 1 Application(s) Topical daily  DAPTOmycin IVPB      DAPTOmycin IVPB 400 milliGRAM(s) IV Intermittent every 24 hours  dextrose 5%. 1000 milliLiter(s) (50 mL/Hr) IV Continuous <Continuous>  dextrose 5%. 1000 milliLiter(s) (100 mL/Hr) IV Continuous <Continuous>  dextrose 50% Injectable 25 Gram(s) IV Push once  dextrose 50% Injectable 12.5 Gram(s) IV Push once  dextrose 50% Injectable 25 Gram(s) IV Push once  fat emulsion (Fish Oil and Plant Based) 20% Infusion 0.7764 Gm/kG/Day (20.83 mL/Hr) IV Continuous <Continuous>  glucagon  Injectable 1 milliGRAM(s) IntraMuscular once  heparin  Infusion 1400 Unit(s)/Hr (16 mL/Hr) IV Continuous <Continuous>  influenza  Vaccine (HIGH DOSE) 0.7 milliLiter(s) IntraMuscular once  insulin lispro (ADMELOG) corrective regimen sliding scale   SubCutaneous every 6 hours  metoprolol tartrate Injectable 5 milliGRAM(s) IV Push every 6 hours  mirtazapine 30 milliGRAM(s) Oral at bedtime  nystatin Powder 1 Application(s) Topical two times a day  pantoprazole  Injectable 40 milliGRAM(s) IV Push every 12 hours  Parenteral Nutrition - Adult 1 Each (63 mL/Hr) TPN Continuous <Continuous>  povidone iodine 10% Solution 1 Application(s) Topical daily  sodium chloride 0.9% lock flush 10 milliLiter(s) IV Push every 8 hours  tigecycline IVPB      tigecycline IVPB 50 milliGRAM(s) IV Intermittent every 12 hours    MEDICATIONS  (PRN):  benzocaine/menthol Lozenge 1 Lozenge Oral every 4 hours PRN Sore Throat  dextrose Oral Gel 15 Gram(s) Oral once PRN Blood Glucose LESS THAN 70 milliGRAM(s)/deciliter  HYDROmorphone  Injectable 0.25 milliGRAM(s) IV Push every 4 hours PRN Severe Pain (7 - 10)  ondansetron Injectable 4 milliGRAM(s) IV Push every 6 hours PRN Nausea and/or Vomiting  sodium chloride 0.9% lock flush 10 milliLiter(s) IV Push every 1 hour PRN Pre/post blood products, medications, blood draw, and to maintain line patency        Vital Signs Last 24 Hrs  T(C): 37.7 (23 Mar 2023 05:06), Max: 38.9 (22 Mar 2023 14:35)  T(F): 99.9 (23 Mar 2023 05:06), Max: 102 (22 Mar 2023 14:35)  HR: 89 (23 Mar 2023 08:00) (83 - 102)  BP: 116/68 (23 Mar 2023 08:00) (100/69 - 139/74)  BP(mean): 87 (23 Mar 2023 08:00) (81 - 100)  RR: 16 (23 Mar 2023 08:00) (11 - 21)  SpO2: 94% (23 Mar 2023 08:00) (92% - 100%)    Parameters below as of 23 Mar 2023 08:00  Patient On (Oxygen Delivery Method): room air        03-22-23 @ 07:01  -  03-23-23 @ 07:00  --------------------------------------------------------  IN: 3146.4 mL / OUT: 3800 mL / NET: -653.6 mL    03-23-23 @ 07:01  -  03-23-23 @ 08:06  --------------------------------------------------------  IN: 95.7 mL / OUT: 0 mL / NET: 95.7 mL      PHYSICAL EXAM:  Constitutional: alert, NAD  Eyes: the sclera and conjunctiva were normal.   ENT: the ears and nose were normal in appearance.   Neck: the appearance of the neck was normal and the neck was supple.   Pulmonary: no respiratory distress and lungs were clear to auscultation bilaterally.   Heart: heart rate was normal and rhythm regular, normal S1 and S2  Vascular:. there was no peripheral edema  Abdomen: normal bowel sounds, soft, non-tender  Neurological: no focal deficits.   Psychiatric: the affect was normal        LABS:                        7.2    29.57 )-----------( 694      ( 23 Mar 2023 05:30 )             23.1     03-23    135  |  102  |  12  ----------------------------<  177<H>  4.5   |  25  |  0.49<L>    Ca    7.5<L>      23 Mar 2023 05:30  Phos  3.4     03-23  Mg     2.2     03-23      PT/INR - ( 22 Mar 2023 05:30 )   PT: 15.8 sec;   INR: 1.32          PTT - ( 23 Mar 2023 05:30 )  PTT:85.3 sec      MICROBIOLOGY:      RADIOLOGY & ADDITIONAL STUDIES:  Reviewed INFECTIOUS DISEASES CONSULT FOLLOW-UP NOTE    INTERVAL HPI/OVERNIGHT EVENTS: arthur      ROS:   Constitutional, eyes, ENT, cardiovascular, respiratory, gastrointestinal, genitourinary, integumentary, neurological, psychiatric and heme/lymph are otherwise negative other than noted above       ANTIBIOTICS/RELEVANT:    MEDICATIONS  (STANDING):  aMIOdarone Infusion 0.5 mG/Min (16.7 mL/Hr) IV Continuous <Continuous>  caspofungin IVPB 50 milliGRAM(s) IV Intermittent every 24 hours  chlorhexidine 2% Cloths 1 Application(s) Topical daily  DAPTOmycin IVPB      DAPTOmycin IVPB 400 milliGRAM(s) IV Intermittent every 24 hours  dextrose 5%. 1000 milliLiter(s) (50 mL/Hr) IV Continuous <Continuous>  dextrose 5%. 1000 milliLiter(s) (100 mL/Hr) IV Continuous <Continuous>  dextrose 50% Injectable 25 Gram(s) IV Push once  dextrose 50% Injectable 12.5 Gram(s) IV Push once  dextrose 50% Injectable 25 Gram(s) IV Push once  fat emulsion (Fish Oil and Plant Based) 20% Infusion 0.7764 Gm/kG/Day (20.83 mL/Hr) IV Continuous <Continuous>  glucagon  Injectable 1 milliGRAM(s) IntraMuscular once  heparin  Infusion 1400 Unit(s)/Hr (16 mL/Hr) IV Continuous <Continuous>  influenza  Vaccine (HIGH DOSE) 0.7 milliLiter(s) IntraMuscular once  insulin lispro (ADMELOG) corrective regimen sliding scale   SubCutaneous every 6 hours  metoprolol tartrate Injectable 5 milliGRAM(s) IV Push every 6 hours  mirtazapine 30 milliGRAM(s) Oral at bedtime  nystatin Powder 1 Application(s) Topical two times a day  pantoprazole  Injectable 40 milliGRAM(s) IV Push every 12 hours  Parenteral Nutrition - Adult 1 Each (63 mL/Hr) TPN Continuous <Continuous>  povidone iodine 10% Solution 1 Application(s) Topical daily  sodium chloride 0.9% lock flush 10 milliLiter(s) IV Push every 8 hours  tigecycline IVPB      tigecycline IVPB 50 milliGRAM(s) IV Intermittent every 12 hours    MEDICATIONS  (PRN):  benzocaine/menthol Lozenge 1 Lozenge Oral every 4 hours PRN Sore Throat  dextrose Oral Gel 15 Gram(s) Oral once PRN Blood Glucose LESS THAN 70 milliGRAM(s)/deciliter  HYDROmorphone  Injectable 0.25 milliGRAM(s) IV Push every 4 hours PRN Severe Pain (7 - 10)  ondansetron Injectable 4 milliGRAM(s) IV Push every 6 hours PRN Nausea and/or Vomiting  sodium chloride 0.9% lock flush 10 milliLiter(s) IV Push every 1 hour PRN Pre/post blood products, medications, blood draw, and to maintain line patency        Vital Signs Last 24 Hrs  T(C): 37.7 (23 Mar 2023 05:06), Max: 38.9 (22 Mar 2023 14:35)  T(F): 99.9 (23 Mar 2023 05:06), Max: 102 (22 Mar 2023 14:35)  HR: 89 (23 Mar 2023 08:00) (83 - 102)  BP: 116/68 (23 Mar 2023 08:00) (100/69 - 139/74)  BP(mean): 87 (23 Mar 2023 08:00) (81 - 100)  RR: 16 (23 Mar 2023 08:00) (11 - 21)  SpO2: 94% (23 Mar 2023 08:00) (92% - 100%)    Parameters below as of 23 Mar 2023 08:00  Patient On (Oxygen Delivery Method): room air        03-22-23 @ 07:01  -  03-23-23 @ 07:00  --------------------------------------------------------  IN: 3146.4 mL / OUT: 3800 mL / NET: -653.6 mL    03-23-23 @ 07:01  -  03-23-23 @ 08:06  --------------------------------------------------------  IN: 95.7 mL / OUT: 0 mL / NET: 95.7 mL      PHYSICAL EXAM:  Constitutional: alert, NAD  Eyes: the sclera and conjunctiva were normal.   ENT: the ears and nose were normal in appearance.   Neck: the appearance of the neck was normal and the neck was supple.   Pulmonary: no respiratory distress and lungs were clear to auscultation bilaterally.   Heart: heart rate was normal and rhythm regular, normal S1 and S2  Vascular: left stump wrapped without strikethrough, WWP, no clubbing/cyanosis/edema  Abdomen: mildly tender to palpation (L>R) without guarding or rigidity, LUQ drains in place, LLQ malodorous  Neurological: no focal deficits.         LABS:                        7.2    29.57 )-----------( 694      ( 23 Mar 2023 05:30 )             23.1     03-23    135  |  102  |  12  ----------------------------<  177<H>  4.5   |  25  |  0.49<L>    Ca    7.5<L>      23 Mar 2023 05:30  Phos  3.4     03-23  Mg     2.2     03-23      PT/INR - ( 22 Mar 2023 05:30 )   PT: 15.8 sec;   INR: 1.32          PTT - ( 23 Mar 2023 05:30 )  PTT:85.3 sec      MICROBIOLOGY:      RADIOLOGY & ADDITIONAL STUDIES:  Reviewed

## 2023-03-23 NOTE — PROGRESS NOTE ADULT - TIME BILLING
Evaluation of fevers; management of bacteremia
Evaluation and management of fever and intra-abdominal abscess; evaluation of goals of care with respect to infection
Patient seen and examined with house-staff during bedside rounds.  Resident note read, including vitals, physical findings, laboratory data, and radiological reports.   Revisions included below.  Direct personal management at bed side and extensive interpretation of the data.  Plan was outlined and discussed in details with the housestaff.  Decision making of high complexity  Action taken for acute disease activity to reflect the level of care provided:  - medication reconciliation  - review laboratory data  The patient is clinically stable.  Hemoglobin is slowly decreased but probably equilibrating.  No evidence of acute blood loss.  The patient is off heparin.  The patient is off pressors at this point.  Urine output increased over the last few hours.  Renal function is stable.  Will discuss with surgery regarding starting heparin at a low dose and titrate upwards on monitor.  If the hemoglobin drops patient will probably will require angiogram.  Start oral diet.The cardiac status is stable.  Continue cardiac meds
evaluation of fevers
treatment of intrabdominal collection and evaluation of fever
treatment of polymicrobial bacteremia
-Tachycardia - recent h/o Aflut c/b TICM now s/p DCCV on 12/30; remains in sinus - currently with multifactorial (active fevers, anemia) sinus tachycardia in 100-110; Would continue to treat the underlying, abx and PRBC transfusions as per primary team; c/w Toprol 25 daily; c/w Lovenox 60 BID if no contraindications  -sCHF - Tachy induced CM, now s/p DCCV, remains NSR, currently euvolemic on exam, WWP; c/w Toprol 25 daily, Entresto 24/26 BID, and Spironolactone 25 daily  -Will continue to follow    Mayur Mares MD  Cardiology Attending
CT AP 3/19 unfortunately showing progression of intra-abdominal infection--expanding RLQ abscess, new large LUQ abscess, peritonitis despite targeted and prolonged antimicrobial therapy. Plan for IR attempt at drainage noted however ?adequacy of this approach to attain source control. If does not improve significantly with IR drainage and does not RTOR for additional source control, then would invoke futility of antimicrobial therapy and consideration of hospice--continue to advise re-engagement with palliative care in this very challenging case.     d/w Dr. Lanier
Evaluation of fever; management of bacteremia
Management of bacteremia
New fever without localizing signs of infection and normal serum procalcitonin. Blood cultures last sent 1/14--advise send repeat blood cultures today given new fever; check RVP; fever may be non-infectious ?in setting of intra-abdominal hematoma. Advise continued observation off antibiotics.
Review of hospital course, labs, vitals, medical records.   Bedside exam  Discussed plan of care with primary team ACP and housestaff   Documenting the encounter
Review of hospital course, labs, vitals, medical records.   Bedside exam and interview   Discussed plan of care with primary team ACP and housestaff   Discussed patient's status with floor nursing.   Documenting the encounter
Review of hospital course, labs, vitals, medical records.   Bedside exam and interview   Discussed plan of care with primary team ACP and housestaff   Documenting the encounter
evaluation of lactic acidosis and leukocytosis
treatment of abdominal abscess
treatment of bacteremia and abdominal abscess
treatment of infected fluid collection
Evaluation of fever; management of bacteremia
Management of bacteremia and abscess
Mesenteric and LE ischemia; FTT and severe malnutrition. Intermittent and improving fevers; improving leukocytosis (off antibiotics); bcx 1/25 ngtd and UA negative (presence of yeast noted and not the source of fevers). Advise continued observation off antibiotics. The ID service will sign off again--please reconsult if blood culture from 1/25 becomes positive or concern for new infection arises (fever and leukocytosis are sometimes not due to infection).
Ongoing intermittent fevers in setting of intra-abdominal hematoma; would also add diff to CBC (to look for peripheral eosinophilia) and ask primary team perform med rec with pharmacy ?drug fever. He is also at increased risk for invasive candidiasis in setting of TPN--blood cultures were requested 1/19 and are currently pending collection--per my discussion with patient, he is amenable to collection of blood cultures today.   Advise palliative care assessment given FTT and overall poor prognosis.  Please reconsult if blood culture returns positive or other concern for infectious etiology of fevers arises.
Patient seen and examined with house-staff during bedside rounds.  Resident note read, including vitals, physical findings, laboratory data, and radiological reports.   Revisions included below.  Direct personal management at bed side and extensive interpretation of the data.  Plan was outlined and discussed in details with the housestaff.  Decision making of high complexity  Action taken for acute disease activity to reflect the level of care provided:  - medication reconciliation  - review laboratory data      I discussed the case with surgeon.  There is no evidence of surgical issues at this point.  Vascular is awaiting demarcation of the limb for amputation.  Patient is followed by cardiology in the CHF group.  Patient be transferred to the CCU service with vascular and surgery follow-up.  The patient was seen by psychiatry.  Follow-up with palliative.  Patient was started on diet
as noted above
evaluation and management of multiple chronic issues
treatment of abdominal abscess and bacteremia
Evaluation of fever; management of bacteremia
Management of bacteremia and intra-abdominal abscess
Management of bacteremia; evaluation of recurrent fever
Recurrent fever and worsening leukocytosis despite IR attempt at drainage of intra-abdominal abscesses. Patient at high risk of morbidity with RTOR--if continues to fail to improve with current management and not a candidate for RTOR, then would inform [facility]. Have requested Klebsiella susceptibility including tigecycline E test as well as data on additional growth from abdominal drainage cultures--while awaiting results, reasonable to continue vancomycin, tigecycline, caspofungin.
Review of hospital course (LOS >20) , labs, vitals, medical records.   Bedside exam and interview   Discussed plan of care with primary team ACP and housestaff   Documenting the encounter
case complexity as above
treatment of abdominal abscess
treatment of bacteremia and abdominal abscess
treatment of polymicrobial bacteremia due to abdominal abscess
Management of intra-abdominal abscess
Patient seen and examined with house-staff during bedside rounds.  Resident note read, including vitals, physical findings, laboratory data, and radiological reports.   Revisions included below.  Direct personal management at bed side and extensive interpretation of the data.  Plan was outlined and discussed in details with the housestaff.  Decision making of high complexity  Action taken for acute disease activity to reflect the level of care provided:  - medication reconciliation  - review laboratory data  I discussed the case with surgery and vascular surgery.  No surgical intervention from vascular point of view until the leg is demarcated for  amputation.  Patient had suicidal thoughts and will follow-up with palliative and psychiatry.  There patient need follow-up with cardiology regarding his CHF status.  Continue anticoagulation
Patient seen and examined with house-staff during bedside rounds.  Resident note read, including vitals, physical findings, laboratory data, and radiological reports.   Revisions included below.  Direct personal management at bed side and extensive interpretation of the data.  Plan was outlined and discussed in details with the housestaff.  Decision making of high complexity  Action taken for acute disease activity to reflect the level of care provided:  - medication reconciliation  - review laboratory data  Noticed.  The patient has a drop in hemoglobin.  The patient required transfusion.  Discussed case with surgery and cardiology.  There is no clinical evidence of fluid overload.  The shock is related to hemorrhagic shock.  The blood pressure is stable off pressors right now.  CT scan of the abdomen was ordered.  Patient was seen with GI.  Finding on the CT scan were observed.  Follow hemoglobin.  Can hold anticoagulation.
Review of hospital course, labs, vitals, medical records.   Bedside exam and interview   Discussed plan of care with primary team ACP and housestaff   Documenting the encounter
Unfortunately, Klebsiella isolate is now CRE and also resistant to last line agent (tigecycline). Given failure to improve with IR drainage attempt and progressive infection without viable treatment options, advise d/c antimicrobials which are considered futile at this juncture and re-engage palliative care for GOC and discussion of hospice. This was discussed with the patient who is amenable to palliative care reassessment.    The ID service will sign off at this time.
case complexity as above
coordination of care with surgical team  review of chart and hospital course and transfer course at outside hospital  face to face encounter with patient  review of meds for preop management  formulating A/P
evaluation and management of multiple issues following acute mesenteric ischemia including malnutrition, failure to thrive, high ostomy output, intraabdominal collection, and LLE gangrene
s/p IR drainage RLQ abscess (OLGA drain with hira pus and cultures again growing Klebsiella), LUQ (SS, with growth of few Klebsiella); GS with GPC clusters--if no growth of GPC by 3/22, would d/c vancomycin. f/u for additional growth on culture and Klebsiella susceptibility. Continue tigecycline and caspofungin. Monitor clinical status to determine next steps/GOC.
treatment of abdominal abscess
CT AP pursued as per my recommendations for increasing leukocytosis--reveal RLQ abscess--not amenable to IR drainage citing bowel in front of lesion; may require surgical intervention for source control given size and increasing leukocytosis. Agree with empiric Zosyn. If/when drainage pursued, would send sample for GS and culture to guide antibiotic therapy.    Dr. Barnes to cover this evening thru Sunday; I return Monday.
Evaluation of recurrent fever
case complexity as above
Fevers and leukocytosis in setting of undrained intra-abdominal abscess. On Zosyn; course undefinable at this time. Reiterate need for palliative care evaluation. Remainder of recommendations as above.     The ID service will sign off at this time.
Management of fever and intra-abdominal abscess
evaluation and management of acute mesenteric ischemia, discussion with consultants, review of labs and imaging
treatment of bacteremia
treatment of polymicrobial bacteremia
treatment of bacteremia and abdominal abscess
Patient seen and examined with house-staff during bedside rounds.  Resident note read, including vitals, physical findings, laboratory data, and radiological reports.   Revisions included below.  Direct personal management at bed side and extensive interpretation of the data.  Plan was outlined and discussed in details with the housestaff.  Decision making of high complexity  Action taken for acute disease activity to reflect the level of care provided:  - medication reconciliation  - review laboratory data      The patient is stable.  He is more alert.  The blood pressure controlled.  Although the white count is elevated his temperature curve is down here and he is more alert.  Continue drainage.  Continue to feed.  Patient was out of bed in chair.  Patient be transferred to the surgical service once he is more stable by tomorrow morning
Emotional Support/Supportive Care and Clarification of Potential Disease Trajectory related to prolonged complicated hospital course

## 2023-03-23 NOTE — GOALS OF CARE CONVERSATION - ADVANCED CARE PLANNING - AGENT'S NAME
Sophie Aleman (Sister) [phone] (cell)  [phone]
Sophie Aleman (Sister) [phone] (cell)  [phone]

## 2023-03-23 NOTE — PROGRESS NOTE ADULT - ASSESSMENT
77M PMHx of IVDU found unresponsive at his nursing home, resolved after Narcan in the field and brought to [facility]. Found to have PE, atrial flutter, and large LV thrombus on echo. Transferred to [facility] for further management. Pt c/o abdominal pain on 12/10 CTA showing mid SMA with embolus. Abnormal distal small bowel loops and cecum with dilatation and pneumatosis suggesting infarcted bowel. Now s/p Ex lap, SMA embolectomy, 80cm SBR, abthera vac left in discontinuity (12/11). S/p second look (12/13), s/p OR for 3rd look, end-to-end anastomosis of remaining bowel, loop ileostomy and abdomen closure (12/15). S/p LORENZO and Cardioversion on 12/30 with cardiology. Patient was nutritionally optimized. 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 s/p L AKA closure (3/1), s/p ileostomy reversal (3/7). Patient with noted coffee gorund/dark red drainage from NGT, Hgb found to be 4 and 5, upgraded to SICU for HD monitoring. Passing flatus, NGT output now clear from coffee ground 03/16. CT A/P 03/19 showing increased abdominal collection over LUQ.    Plan:    NPO  IVF  NGT to LIWS  Monitor drain output  Cont abx  F/u ID recs  Rest of care per SICU  Surgery Team 4C will continue to follow. Please page Team 4 with questions/clinical changes. [phone]

## 2023-03-24 NOTE — PROCEDURE NOTE - PROCEDURE DATE TIME, MLM
24-Mar-2023 15:24
07-Dec-2022 16:58
11-Dec-2022 06:56
01-Jan-2023 20:55
10-Ladarius-2023 11:41
22-Dec-2022 13:00
10-Feb-2023 21:53
20-Dec-2022 00:34
22-Dec-2022 08:12
20-Feb-2023 12:16

## 2023-03-24 NOTE — PROCEDURE NOTE - NSINFORMCONSENT_GEN_A_CORE
Benefits, risks, and possible complications of procedure explained to patient/caregiver who verbalized understanding and gave verbal consent.
Benefits, risks, and possible complications of procedure explained to patient/caregiver who verbalized understanding and gave verbal consent.
Benefits, risks, and possible complications of procedure explained to patient/caregiver who verbalized understanding and gave written consent.
This was an emergent procedure.
Benefits, risks, and possible complications of procedure explained to patient/caregiver who verbalized understanding and gave verbal consent.
Benefits, risks, and possible complications of procedure explained to patient/caregiver who verbalized understanding and gave written consent.
patient refused to sign but gave verbal/Benefits, risks, and possible complications of procedure explained to patient/caregiver who verbalized understanding and gave verbal consent.
This was an emergent procedure.

## 2023-03-24 NOTE — PROGRESS NOTE ADULT - ASSESSMENT
77M PMHx of IVDU found unresponsive at his nursing home, resolved after Narcan in the field and brought to [facility]. Found to have PE, atrial flutter, and large LV thrombus on echo. Transferred to [facility] for further management. Pt c/o abdominal pain on 12/10 CTA showing mid SMA with embolus. Abnormal distal small bowel loops and cecum with dilatation and pneumatosis suggesting infarcted bowel. Now s/p Ex lap, SMA embolectomy, 80cm SBR, abthera vac left in discontinuity (12/11). S/p second look (12/13), s/p OR for 3rd look, end-to-end anastomosis of remaining bowel, loop ileostomy and abdomen closure (12/15). S/p LORENZO and Cardioversion on 12/30 with cardiology. Patient was nutritionally optimized. 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 s/p L AKA closure (3/1), s/p ileostomy reversal (3/7). Patient with noted coffee gorund/dark red drainage from NGT, Hgb found to be 4 and 5, upgraded to SICU for HD monitoring. Passing flatus, NGT output now clear from coffee ground 03/16. CT A/P 03/19 showing increased abdominal collection over LUQ.    Plan:    F/u IR for possible drain upsize and adjustment  NPO  IVF  NGT to LIWS  Monitor drain output  Rest of care per SICU  Surgery Team 4C will continue to follow. Please page Team 4 with questions/clinical changes. [phone]

## 2023-03-24 NOTE — PROCEDURE NOTE - GENERAL PROCEDURE NAME
Upsize of Two Left sided Abdominal Drainage Catheters, Placement of New Subhepatic Drainage catheter

## 2023-03-24 NOTE — PROCEDURE NOTE - NSPROCNAME_GEN_A_CORE
Interventional Radiology
PICC Line Insertion
Peripheral Line Insertion
Peripheral Line Insertion
Gastric Intubation/Gastric Lavage
Arterial Puncture/Cannulation
Arterial Puncture/Cannulation
Central Line Insertion
Central Line Insertion
PICC Line Insertion

## 2023-03-24 NOTE — PROCEDURE NOTE - PROCEDURE FINDINGS AND DETAILS
New subhepatic percutaneous drainage catheter placed. Upsize of two previously existing left abdominal drainage catheters

## 2023-03-24 NOTE — PROGRESS NOTE ADULT - ASSESSMENT
77M PMHx of IVDU found unresponsive at his nursing home, resolved after Narcan in the field and brought to [facility]. Found to have PE, atrial flutter, and large LV thrombus on echo. Transferred to [facility] for further management. Pt c/o abdominal pain on 12/10 CTA showing mid SMA with embolus. Abnormal distal small bowel loops and cecum with dilatation and pneumatosis suggesting infarcted bowel. Now s/p Ex lap, SMA embolectomy, 80cm SBR, abthera vac left in discontinuity (12/11). S/p second look (12/13), s/p OR for 3rd look, end-to-end anastomosis of remaining bowel, loop ileostomy and abdomen closure (12/15). S/p LORENZO and Cardioversion on 12/30 with cardiology. Patient was nutritionally optimized. 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 s/p L AKA closure (3/1), s/p ileostomy reversal (3/7) c/b ileus. Course c/b upper GI bleeding 3/13 w/ coffee ground emesis (resolved), sepsis 2/2 intra-abdominal abscesses. Now s/p IR drainage w/ 2 drains in the LUQ and 1 in the pelvis w/ 750 cc total output (3/20).    Neuro: Pain: LidoDerm, Tylenol PRN, Dilaudid PRN. Nausea: Zofran PRN. Depression/Appetite: Mirtazapine.   ENT: Cepacol for sore throat  CV: Tachycardic 2/2 sepsis. Repeat echo with EF (02/11) 55-60%. Mildly dilated RV. Maintain MAP >65. A.Fib/Flutter: s/p cardioversion on 12/30. Cont metoprolol and IV amiodarone; hx HTN: holding spironolactone, Entresto.  Pulm: Comfortable on RA  GI/FEN: s/p ostomy reversal (3/7). Upper GI bleed (resolved): on PPI BID. SBO/ileus: NGT to LIWS, NPO. PICC w TPN. VitC, MTV, Zinc, Octreotide in TPN.  : Voiding, Strict I and O.   Endo: mISS  Heme: LV thrombus w/ LLE ischemia: Heparin gtt held after GI bleed, resumed (3/17-3/20, 3/21-). Active T&S.  ID: Sepsis 2/2 intra-abdominal abscesses, s/p IR drains x3 (3/20) growing MDR Klebsiella pneumoniae, staph haemolyticus, VRE faecium, parabacteroides goldsteinii. LLE ischemia s/p L AKA 2/15 // DC: tigecycline (3/16-3/23), Caspo (3/14-3/23), Daptomycin 2/11-3/9; 3/10-16, 3/22-3/23), Zosyn, Fluconazole (3/9-13), Erta (2/13-3/10; 3/13), Tobra (3/13-16), Luis (2/10-12, 3/13-16), Vancomycin (2/10-2/11, 3/20-22). ID recommending d/c all abx - futile/no options given resistance. Pending transition to hospice care discussion with family.   PPX: SCDs  L/D/T: PIV, LUQ drains x2, LLQ drain  PT/OT: Not ordered  Dispo: SICU   77M PMHx of IVDU found unresponsive at his nursing home, resolved after Narcan in the field and brought to [facility]. Found to have PE, atrial flutter, and large LV thrombus on echo. Transferred to [facility] for further management. Pt c/o abdominal pain on 12/10 CTA showing mid SMA with embolus. Abnormal distal small bowel loops and cecum with dilatation and pneumatosis suggesting infarcted bowel. Now s/p Ex lap, SMA embolectomy, 80cm SBR, abthera vac left in discontinuity (12/11). S/p second look (12/13), s/p OR for 3rd look, end-to-end anastomosis of remaining bowel, loop ileostomy and abdomen closure (12/15). S/p LORENZO and Cardioversion on 12/30 with cardiology. Patient was nutritionally optimized. 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 s/p L AKA closure (3/1), s/p ileostomy reversal (3/7) c/b ileus. Course c/b upper GI bleeding 3/13 w/ coffee ground emesis (resolved), sepsis 2/2 intra-abdominal abscesses. Now s/p IR drainage w/ 2 drains in the LUQ and 1 in the pelvis w/ 750 cc total output (3/20).    Neuro: Pain: LidoDerm, Tylenol PRN, Dilaudid PRN. Nausea: Zofran PRN. Depression/Appetite: Mirtazapine.   ENT: Cepacol for sore throat  CV: Tachycardic 2/2 sepsis. Repeat echo with EF (02/11) 55-60%. Mildly dilated RV. Maintain MAP >65. A.Fib/Flutter: s/p cardioversion on 12/30. Cont metoprolol and IV amiodarone; hx HTN: holding spironolactone, Entresto.  Pulm: Comfortable on RA  GI/FEN: s/p ostomy reversal (3/7). Upper GI bleed (resolved): on PPI BID. SBO/ileus: NGT to LIWS, NPO. PICC w TPN. VitC, MTV, Zinc, Octreotide in TPN.  : Voiding, Strict I and O.   Endo: mISS  Heme: LV thrombus w/ LLE ischemia: Heparin gtt held after GI bleed, resumed (3/17-3/20, 3/21-). Active T&S.  ID: Sepsis 2/2 intra-abdominal abscesses, s/p IR drains x3 (3/20) growing MDR Klebsiella pneumoniae, staph haemolyticus, VRE faecium, parabacteroides goldsteinii. LLE ischemia s/p L AKA 2/15 // DC: tigecycline (3/16-3/23), Caspo (3/14-3/23), Daptomycin 2/11-3/9; 3/10-16, 3/22-3/23), Zosyn, Fluconazole (3/9-13), Erta (2/13-3/10; 3/13), Tobra (3/13-16), Luis (2/10-12, 3/13-16), Vancomycin (2/10-2/11, 3/20-22). ID recommending d/c all abx - futile/no options given resistance. Pending GOC/palliative/transition to hospice care discussion with family.   PPX: SCDs  L/D/T: PIV, LUQ drains x2, LLQ drain  PT/OT: Not ordered  Dispo: SICU   77M PMHx of IVDU found unresponsive at his nursing home, resolved after Narcan in the field and brought to [facility]. Found to have PE, atrial flutter, and large LV thrombus on echo. Transferred to [facility] for further management. Pt c/o abdominal pain on 12/10 CTA showing mid SMA with embolus. Abnormal distal small bowel loops and cecum with dilatation and pneumatosis suggesting infarcted bowel. Now s/p Ex lap, SMA embolectomy, 80cm SBR, abthera vac left in discontinuity (12/11). S/p second look (12/13), s/p OR for 3rd look, end-to-end anastomosis of remaining bowel, loop ileostomy and abdomen closure (12/15). S/p LORENZO and Cardioversion on 12/30 with cardiology. Patient was nutritionally optimized. 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 s/p L AKA closure (3/1), s/p ileostomy reversal (3/7) c/b ileus. Course c/b upper GI bleeding 3/13 w/ coffee ground emesis (resolved), sepsis 2/2 intra-abdominal abscesses. Now s/p IR drainage w/ 2 drains in the LUQ and 1 in the pelvis w/ 750 cc total output (3/20).    Neuro: Pain: LidoDerm, Tylenol PRN, Dilaudid PRN. Nausea: Zofran PRN. Depression/Appetite: Mirtazapine.   ENT: Cepacol for sore throat  CV: Tachycardic 2/2 sepsis. Repeat echo with EF (02/11) 55-60%. Mildly dilated RV. Maintain MAP >65. A.Fib/Flutter: s/p cardioversion on 12/30. Cont metoprolol and IV amiodarone; hx HTN: holding spironolactone, Entresto.  Pulm: Comfortable on RA  GI/FEN: s/p ostomy reversal (3/7). Upper GI bleed (resolved): on PPI BID. SBO/ileus: NGT to LIWS, NPO. PICC w TPN. VitC, MTV, Zinc, Octreotide in TPN.  : Voiding, Strict I and O.   Endo: mISS  Heme: LV thrombus w/ LLE ischemia: Heparin gtt held after GI bleed, resumed (3/17-3/20, 3/21-). Active T&S.  ID: Sepsis 2/2 intra-abdominal abscesses, s/p IR drains x3 (3/20) growing MDR Klebsiella pneumoniae, staph haemolyticus, VRE faecium, parabacteroides goldsteinii. LLE ischemia s/p L AKA 2/15 // DC: tigecycline (3/16-3/23), Caspo (3/14-3/23), Daptomycin 2/11-3/9; 3/10-16, 3/22-3/23), Zosyn, Fluconazole (3/9-13), Erta (2/13-3/10; 3/13), Tobra (3/13-16), Luis (2/10-12, 3/13-16), Vancomycin (2/10-2/11, 3/20-22). Dr. Lanier communicated with patient and family - they would like to continue active treatment, deferring palliative care at this time. Plan for IR upsizing of drain and placement of new drain for retrohepatic collection. ID recommending d/c all abx - futile/no options given resistance.  PPX: SCDs  L/D/T: PIV, LUQ drains x2, LLQ drain  PT/OT: Not ordered  Dispo: SICU   77M PMHx of IVDU found unresponsive at his nursing home, resolved after Narcan in the field and brought to [facility]. Found to have PE, atrial flutter, and large LV thrombus on echo. Transferred to [facility] for further management. Pt c/o abdominal pain on 12/10 CTA showing mid SMA with embolus. Abnormal distal small bowel loops and cecum with dilatation and pneumatosis suggesting infarcted bowel. Now s/p Ex lap, SMA embolectomy, 80cm SBR, abthera vac left in discontinuity (12/11). S/p second look (12/13), s/p OR for 3rd look, end-to-end anastomosis of remaining bowel, loop ileostomy and abdomen closure (12/15). S/p LORENZO and Cardioversion on 12/30 with cardiology. Patient was nutritionally optimized. 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 s/p L AKA closure (3/1), s/p ileostomy reversal (3/7) c/b ileus. Course c/b upper GI bleeding 3/13 w/ coffee ground emesis (resolved), sepsis 2/2 intra-abdominal abscesses. Now s/p IR drainage w/ 2 drains in the LUQ and 1 in the pelvis w/ 750 cc total output (3/20).    Neuro: Pain: LidoDerm, Tylenol PRN, Dilaudid PRN. Nausea: Zofran PRN. Depression/Appetite: Mirtazapine.   ENT: Cepacol for sore throat  CV: Tachycardic 2/2 sepsis. Repeat echo with EF (02/11) 55-60%. Mildly dilated RV. Maintain MAP >65. A.Fib/Flutter: s/p cardioversion on 12/30. Cont metoprolol and IV amiodarone; hx HTN: holding spironolactone, Entresto.  Pulm: Comfortable on RA  GI/FEN: s/p ostomy reversal (3/7). Upper GI bleed (resolved): on PPI BID. SBO/ileus: NGT to LIWS, NPO. PICC w TPN. VitC, MTV, Zinc, Octreotide in TPN.  : Voiding, Strict I and O.   Endo: mISS  Heme: LV thrombus w/ LLE ischemia: Heparin gtt held after GI bleed, resumed (3/17-3/20, 3/21-). Active T&S.  ID: Sepsis 2/2 intra-abdominal abscesses, s/p IR drains x3 (3/20) growing MDR Klebsiella pneumoniae, staph haemolyticus, VRE faecium, parabacteroides goldsteinii. LLE ischemia s/p L AKA 2/15 // DC: tigecycline (3/16-3/23), Caspo (3/14-3/23), Daptomycin 2/11-3/9; 3/10-16, 3/22-3/23), Zosyn, Fluconazole (3/9-13), Erta (2/13-3/10; 3/13), Tobra (3/13-16), Luis (2/10-12, 3/13-16), Vancomycin (2/10-2/11, 3/20-22). Dr. Lanier communicated with patient and family - they would like to continue active treatment, deferring palliative care at this time. Plan for IR upsizing of drain and placement of new drain for retrohepatic collection today. Will continue ongoing goals of care discussion. ID recommending d/c all abx - futile/no options given resistance.  PPX: SCDs  L/D/T: PIV, LUQ drains x2, LLQ drain  PT/OT: Not ordered  Dispo: SICU

## 2023-03-24 NOTE — PROGRESS NOTE ADULT - ATTENDING COMMENTS
Patient seen and examined with house-staff during bedside rounds.  Resident note read, including vitals, physical findings, laboratory data, and radiological reports.   Revisions included below.  Direct personal management at bed side and extensive interpretation of the data.  Plan was outlined and discussed in details with the housestaff.  Decision making of high complexity  Action taken for acute disease activity to reflect the level of care provided:  - medication reconciliation  - review laboratory data Patient seen and examined with house-staff during bedside rounds.  Resident note read, including vitals, physical findings, laboratory data, and radiological reports.   Revisions included below.  Direct personal management at bed side and extensive interpretation of the data.  Plan was outlined and discussed in details with the housestaff.  Decision making of high complexity  Action taken for acute disease activity to reflect the level of care provided:  - medication reconciliation  - review laboratory data  And on multiple discussion with surgery and IR.  The patient CT scan revealed resolution of one of the big block but is a new subhepatic fluid collection and the other collection is undrained.  Patient went for IR procedure and upgraded the drain and a new drain through the liver.  Continue off antibiotic.  Follow-up with infectious disease palliative case is involved I discussed the case with them.  The condition is futile

## 2023-03-24 NOTE — PROGRESS NOTE ADULT - SUBJECTIVE AND OBJECTIVE BOX
ON: CT showed improved pelvic and LUQ collection, new RUQ collection w/ air, trace pneumoperitoneum poss leak vs perf (no extrav), "dying wish" to have NGT out and have some juice, Yannick wants NGT in until confirmed GOC w/ hospice plan  3/23: PTT 83, cont. hep gtt @16. TPN. ID recommending d/c all abx - futile/no options given resistance, Palliative saw pt today, ordered CT with IV/PO contrast and rectal. Abx discontinued. Febrile 102.2     SUBJECTIVE: Patient seen and examined bedside; sleepy, but arousable, no complaints this am.    aMIOdarone Infusion 0.5 mG/Min IV Continuous <Continuous>  metoprolol tartrate Injectable 5 milliGRAM(s) IV Push every 6 hours      Vital Signs Last 24 Hrs  T(C): 37.2 (24 Mar 2023 06:50), Max: 39 (23 Mar 2023 17:33)  T(F): 98.9 (24 Mar 2023 06:50), Max: 102.2 (23 Mar 2023 17:33)  HR: 86 (24 Mar 2023 08:00) (79 - 98)  BP: 129/75 (24 Mar 2023 08:00) (116/69 - 161/87)  BP(mean): 96 (24 Mar 2023 08:00) (88 - 114)  RR: 12 (24 Mar 2023 08:00) (10 - 23)  SpO2: 96% (24 Mar 2023 08:00) (90% - 98%)    Parameters below as of 24 Mar 2023 08:00  Patient On (Oxygen Delivery Method): room air      I&O's Detail    23 Mar 2023 07:01  -  24 Mar 2023 07:00  --------------------------------------------------------  IN:    Amiodarone: 400.8 mL    Enteral Tube Flush: 780 mL    Fat Emulsion (Fish Oil &amp; Plant Based) 20% Infusion: 229.1 mL    Heparin: 384 mL    IV PiggyBack: 100 mL    TPN (Total Parenteral Nutrition): 1512 mL  Total IN: 3405.9 mL    OUT:    Bulb (mL): 170 mL    Drain (mL): 290 mL    Drain (mL): 150 mL    Nasogastric/Oral tube (mL): 500 mL    Voided (mL): 2700 mL  Total OUT: 3810 mL    Total NET: -404.1 mL      24 Mar 2023 07:01  -  24 Mar 2023 09:23  --------------------------------------------------------  IN:    Fat Emulsion (Fish Oil &amp; Plant Based) 20% Infusion: 20.8 mL    Heparin: 16 mL    TPN (Total Parenteral Nutrition): 63 mL  Total IN: 99.8 mL    OUT:  Total OUT: 0 mL    Total NET: 99.8 mL      PE:    General: NAD, resting comfortably in bed  HEENT: NGT appropriately positioned, flushes easily, clear brown fluid in canister  C/V: S1 s2, NSR  Pulm: Nonlabored breathing, no respiratory distress  Abd: Soft, NTND, LUQ drains with clear fluid, pelvic OLGA with purulent fluid  Extrem: Left leg wrapped with ace        LABS:                        7.3    28.57 )-----------( 722      ( 24 Mar 2023 05:55 )             22.5     03-24    134<L>  |  98  |  13  ----------------------------<  214<H>  4.5   |  28  |  0.54    Ca    7.9<L>      24 Mar 2023 05:55  Phos  3.7     03-24  Mg     2.2     03-24      PTT - ( 24 Mar 2023 05:55 )  PTT:75.9 sec      RADIOLOGY & ADDITIONAL STUDIES:

## 2023-03-24 NOTE — PROGRESS NOTE ADULT - SUBJECTIVE AND OBJECTIVE BOX
ON: CT showed improved pelvic and LUQ collection, new RUQ collection w/ air, trace pneumoperitoneum poss leak vs perf (no extrav)     SUBJECTIVE: Patient seen and examined bedside by SICU team.     aMIOdarone Infusion 0.5 mG/Min IV Continuous <Continuous>  heparin  Infusion 1400 Unit(s)/Hr IV Continuous <Continuous>  metoprolol tartrate Injectable 5 milliGRAM(s) IV Push every 6 hours      Vital Signs Last 24 Hrs  T(C): 37.2 (24 Mar 2023 00:52), Max: 39 (23 Mar 2023 17:33)  T(F): 98.9 (24 Mar 2023 00:52), Max: 102.2 (23 Mar 2023 17:33)  HR: 81 (24 Mar 2023 06:00) (79 - 98)  BP: 138/76 (24 Mar 2023 06:00) (116/68 - 161/87)  BP(mean): 102 (24 Mar 2023 06:00) (87 - 114)  RR: 16 (24 Mar 2023 06:00) (10 - 23)  SpO2: 93% (24 Mar 2023 06:00) (90% - 98%)    Parameters below as of 24 Mar 2023 07:00  Patient On (Oxygen Delivery Method): room air      I&O's Detail    22 Mar 2023 07:01  -  23 Mar 2023 07:00  --------------------------------------------------------  IN:    Amiodarone: 400.8 mL    Fat Emulsion (Fish Oil &amp; Plant Based) 20% Infusion: 249.6 mL    Heparin: 384 mL    IV PiggyBack: 100 mL    IV PiggyBack: 50 mL    IV PiggyBack: 250 mL    IV PiggyBack: 200 mL    TPN (Total Parenteral Nutrition): 1512 mL  Total IN: 3146.4 mL    OUT:    Bulb (mL): 360 mL    Drain (mL): 170 mL    Drain (mL): 170 mL    Nasogastric/Oral tube (mL): 150 mL    Voided (mL): 2950 mL  Total OUT: 3800 mL    Total NET: -653.6 mL      23 Mar 2023 07:01  -  24 Mar 2023 06:48  --------------------------------------------------------  IN:    Amiodarone: 400.8 mL    Enteral Tube Flush: 780 mL    Fat Emulsion (Fish Oil &amp; Plant Based) 20% Infusion: 229.1 mL    Heparin: 384 mL    IV PiggyBack: 100 mL    TPN (Total Parenteral Nutrition): 1512 mL  Total IN: 3405.9 mL    OUT:    Bulb (mL): 170 mL    Drain (mL): 290 mL    Drain (mL): 150 mL    Nasogastric/Oral tube (mL): 500 mL    Voided (mL): 2700 mL  Total OUT: 3810 mL    Total NET: -404.1 mL        Physical Exam:    General: NAD  HEENT: NC/AT, dry mucus membranes. NG tube in place with dark bilious output.  Pulmonary: Nonlabored breathing, no respiratory distress, CTAB  Cardiovascular: tachycardic, no murmurs  Abdominal: Mildly tense, non-tender without guarding or rigidity, ND, midline incision well healing, ostomy reversal site with wet to dry dressing. LUQ drains x2 bilious, LLQ drain purulent  Extremities: Left stump c/d/i, WWP, no clubbing/cyanosis/edema  Neuro: A/O x3, CNs II-XII grossly intact          LABS:                        7.3    28.57 )-----------( 722      ( 24 Mar 2023 05:55 )             22.5     03-24    134<L>  |  98  |  13  ----------------------------<  214<H>  4.5   |  28  |  0.54    Ca    7.9<L>      24 Mar 2023 05:55  Phos  3.7     03-24  Mg     2.2     03-24      PTT - ( 24 Mar 2023 05:55 )  PTT:75.9 sec      RADIOLOGY & ADDITIONAL STUDIES:   ON: CT showed improved pelvic and LUQ collection, new RUQ collection w/ air, trace pneumoperitoneum poss leak vs perf (no extrav)     SUBJECTIVE: Patient seen and examined bedside by SICU team.     aMIOdarone Infusion 0.5 mG/Min IV Continuous <Continuous>  heparin  Infusion 1400 Unit(s)/Hr IV Continuous <Continuous>  metoprolol tartrate Injectable 5 milliGRAM(s) IV Push every 6 hours      Vital Signs Last 24 Hrs  T(C): 37.2 (24 Mar 2023 00:52), Max: 39 (23 Mar 2023 17:33)  T(F): 98.9 (24 Mar 2023 00:52), Max: 102.2 (23 Mar 2023 17:33)  HR: 81 (24 Mar 2023 06:00) (79 - 98)  BP: 138/76 (24 Mar 2023 06:00) (116/68 - 161/87)  BP(mean): 102 (24 Mar 2023 06:00) (87 - 114)  RR: 16 (24 Mar 2023 06:00) (10 - 23)  SpO2: 93% (24 Mar 2023 06:00) (90% - 98%)    Parameters below as of 24 Mar 2023 07:00  Patient On (Oxygen Delivery Method): room air      I&O's Detail    22 Mar 2023 07:01  -  23 Mar 2023 07:00  --------------------------------------------------------  IN:    Amiodarone: 400.8 mL    Fat Emulsion (Fish Oil &amp; Plant Based) 20% Infusion: 249.6 mL    Heparin: 384 mL    IV PiggyBack: 100 mL    IV PiggyBack: 50 mL    IV PiggyBack: 250 mL    IV PiggyBack: 200 mL    TPN (Total Parenteral Nutrition): 1512 mL  Total IN: 3146.4 mL    OUT:    Bulb (mL): 360 mL    Drain (mL): 170 mL    Drain (mL): 170 mL    Nasogastric/Oral tube (mL): 150 mL    Voided (mL): 2950 mL  Total OUT: 3800 mL    Total NET: -653.6 mL      23 Mar 2023 07:01  -  24 Mar 2023 06:48  --------------------------------------------------------  IN:    Amiodarone: 400.8 mL    Enteral Tube Flush: 780 mL    Fat Emulsion (Fish Oil &amp; Plant Based) 20% Infusion: 229.1 mL    Heparin: 384 mL    IV PiggyBack: 100 mL    TPN (Total Parenteral Nutrition): 1512 mL  Total IN: 3405.9 mL    OUT:    Bulb (mL): 170 mL    Drain (mL): 290 mL    Drain (mL): 150 mL    Nasogastric/Oral tube (mL): 500 mL    Voided (mL): 2700 mL  Total OUT: 3810 mL    Total NET: -404.1 mL        Physical Exam:    General: NAD  HEENT: NC/AT, dry mucus membranes. NG tube in place with dark bilious output.  Pulmonary: Nonlabored breathing, no respiratory distress, CTAB  Cardiovascular: tachycardic, no murmurs  Abdominal: Mildly tense, non-tender without guarding or rigidity, ND, midline incision well healing, ostomy reversal site with wet to dry dressing. LUQ drains x2 bilious, LLQ drain feculent  Extremities: Left stump c/d/i, WWP, no clubbing/cyanosis/edema  Neuro: A/O x3, CNs II-XII grossly intact          LABS:                        7.3    28.57 )-----------( 722      ( 24 Mar 2023 05:55 )             22.5     03-24    134<L>  |  98  |  13  ----------------------------<  214<H>  4.5   |  28  |  0.54    Ca    7.9<L>      24 Mar 2023 05:55  Phos  3.7     03-24  Mg     2.2     03-24      PTT - ( 24 Mar 2023 05:55 )  PTT:75.9 sec      RADIOLOGY & ADDITIONAL STUDIES:   ON: CT showed improved pelvic and LUQ collection, new RUQ collection w/ air, trace pneumoperitoneum poss leak vs perf (no extrav)     SUBJECTIVE: Patient seen and examined bedside by SICU team. Patient denies pain this morning, no acute complaints. -CP/SOB/N/V    aMIOdarone Infusion 0.5 mG/Min IV Continuous <Continuous>  heparin  Infusion 1400 Unit(s)/Hr IV Continuous <Continuous>  metoprolol tartrate Injectable 5 milliGRAM(s) IV Push every 6 hours      Vital Signs Last 24 Hrs  T(C): 37.2 (24 Mar 2023 00:52), Max: 39 (23 Mar 2023 17:33)  T(F): 98.9 (24 Mar 2023 00:52), Max: 102.2 (23 Mar 2023 17:33)  HR: 81 (24 Mar 2023 06:00) (79 - 98)  BP: 138/76 (24 Mar 2023 06:00) (116/68 - 161/87)  BP(mean): 102 (24 Mar 2023 06:00) (87 - 114)  RR: 16 (24 Mar 2023 06:00) (10 - 23)  SpO2: 93% (24 Mar 2023 06:00) (90% - 98%)    Parameters below as of 24 Mar 2023 07:00  Patient On (Oxygen Delivery Method): room air      I&O's Detail    22 Mar 2023 07:01  -  23 Mar 2023 07:00  --------------------------------------------------------  IN:    Amiodarone: 400.8 mL    Fat Emulsion (Fish Oil &amp; Plant Based) 20% Infusion: 249.6 mL    Heparin: 384 mL    IV PiggyBack: 100 mL    IV PiggyBack: 50 mL    IV PiggyBack: 250 mL    IV PiggyBack: 200 mL    TPN (Total Parenteral Nutrition): 1512 mL  Total IN: 3146.4 mL    OUT:    Bulb (mL): 360 mL    Drain (mL): 170 mL    Drain (mL): 170 mL    Nasogastric/Oral tube (mL): 150 mL    Voided (mL): 2950 mL  Total OUT: 3800 mL    Total NET: -653.6 mL      23 Mar 2023 07:01  -  24 Mar 2023 06:48  --------------------------------------------------------  IN:    Amiodarone: 400.8 mL    Enteral Tube Flush: 780 mL    Fat Emulsion (Fish Oil &amp; Plant Based) 20% Infusion: 229.1 mL    Heparin: 384 mL    IV PiggyBack: 100 mL    TPN (Total Parenteral Nutrition): 1512 mL  Total IN: 3405.9 mL    OUT:    Bulb (mL): 170 mL    Drain (mL): 290 mL    Drain (mL): 150 mL    Nasogastric/Oral tube (mL): 500 mL    Voided (mL): 2700 mL  Total OUT: 3810 mL    Total NET: -404.1 mL        Physical Exam:    General: NAD  HEENT: NC/AT, dry mucus membranes. NG tube in place with dark bilious output.  Pulmonary: Nonlabored breathing, no respiratory distress, CTAB  Cardiovascular: tachycardic, no murmurs  Abdominal: Mildly tense, non-tender without guarding or rigidity, ND, midline incision well healing, ostomy reversal site c/d. LUQ drains x2 bilious, LLQ drain feculent  Extremities: Left stump c/d/i, WWP, no clubbing/cyanosis/edema  Neuro: A/O x3, CNs II-XII grossly intact          LABS:                        7.3    28.57 )-----------( 722      ( 24 Mar 2023 05:55 )             22.5     03-24    134<L>  |  98  |  13  ----------------------------<  214<H>  4.5   |  28  |  0.54    Ca    7.9<L>      24 Mar 2023 05:55  Phos  3.7     03-24  Mg     2.2     03-24      PTT - ( 24 Mar 2023 05:55 )  PTT:75.9 sec      RADIOLOGY & ADDITIONAL STUDIES:

## 2023-03-24 NOTE — CHART NOTE - NSCHARTNOTEFT_GEN_A_CORE
The writer met with the patient for f/u individual psychotherapy. Support was provided in the context of the patient's illness and treatment. No SHIIPAVH reported.

## 2023-03-25 NOTE — PROGRESS NOTE ADULT - SUBJECTIVE AND OBJECTIVE BOX
SUBJECTIVE: Patient seen and examined bedside. Pt without acute complaints.     aMIOdarone Infusion 0.5 mG/Min IV Continuous <Continuous>  heparin  Infusion 1600 Unit(s)/Hr IV Continuous <Continuous>  metoprolol tartrate Injectable 5 milliGRAM(s) IV Push every 6 hours      Vital Signs Last 24 Hrs  T(C): 37.1 (25 Mar 2023 10:37), Max: 38.1 (24 Mar 2023 21:04)  T(F): 98.8 (25 Mar 2023 10:37), Max: 100.6 (24 Mar 2023 21:04)  HR: 80 (25 Mar 2023 11:00) (71 - 89)  BP: 123/74 (25 Mar 2023 11:00) (113/68 - 149/83)  BP(mean): 93 (25 Mar 2023 11:00) (80 - 113)  RR: 24 (25 Mar 2023 11:00) (12 - 27)  SpO2: 97% (25 Mar 2023 11:00) (93% - 98%)    Parameters below as of 24 Mar 2023 22:00  Patient On (Oxygen Delivery Method): room air      I&O's Detail    24 Mar 2023 07:01  -  25 Mar 2023 07:00  --------------------------------------------------------  IN:    Amiodarone: 400.8 mL    Fat Emulsion (Fish Oil &amp; Plant Based) 20% Infusion: 41.7 mL    Fat Emulsion (Fish Oil &amp; Plant Based) 20% Infusion: 83.2 mL    Heparin: 32 mL    IV PiggyBack: 100 mL    TPN (Total Parenteral Nutrition): 1512 mL  Total IN: 2169.7 mL    OUT:    Bulb (mL): 292 mL    Bulb (mL): 189 mL    Drain (mL): 44 mL    Drain (mL): 96 mL    Nasogastric/Oral tube (mL): 350 mL    Voided (mL): 2245 mL  Total OUT: 3216 mL    Total NET: -1046.3 mL      25 Mar 2023 07:01  -  25 Mar 2023 11:53  --------------------------------------------------------  IN:    Amiodarone: 33.4 mL    TPN (Total Parenteral Nutrition): 126 mL  Total IN: 159.4 mL    OUT:    Nasogastric/Oral tube (mL): 100 mL    Voided (mL): 200 mL  Total OUT: 300 mL    Total NET: -140.6 mL          General: NAD, resting comfortably in bed  HEENT: NGT in place with bilious output  C/V: NSR  Pulm: Nonlabored breathing, no respiratory distress  Abd: soft, mildly distended, nontender, incisions CDI, OLGA drains serosanginuous/seropurulent  Extrem: WWP, no edema, SCDs in place        LABS:                        7.1    20.53 )-----------( 709      ( 25 Mar 2023 06:28 )             23.0     03-25    132<L>  |  100  |  12  ----------------------------<  139<H>  4.4   |  29  |  0.52    Ca    8.2<L>      25 Mar 2023 06:28  Phos  3.8     03-25  Mg     2.0     03-25    TPro  6.3  /  Alb  1.8<L>  /  TBili  0.3  /  DBili  x   /  AST  14  /  ALT  10  /  AlkPhos  140<H>  03-25    PT/INR - ( 25 Mar 2023 06:28 )   PT: 15.2 sec;   INR: 1.27          PTT - ( 25 Mar 2023 06:28 )  PTT:31.5 sec      RADIOLOGY & ADDITIONAL STUDIES:

## 2023-03-25 NOTE — PROGRESS NOTE ADULT - SUBJECTIVE AND OBJECTIVE BOX
77M PMHx of IVDU found unresponsive at his nursing home, resolved after Narcan in the field and brought to [facility]. Found to have PE, atrial flutter, and large LV thrombus on echo. Transferred to [facility] for further management. Pt c/o abdominal pain on 12/10 CTA showing mid SMA with embolus. Abnormal distal small bowel loops and cecum with dilatation and pneumatosis suggesting infarcted bowel. Now s/p Ex lap, SMA embolectomy, 80cm SBR, abthera vac left in discontinuity (12/11). S/p second look (12/13), s/p OR for 3rd look, end-to-end anastomosis of remaining bowel, loop ileostomy and abdomen closure (12/15). S/p LORENZO and Cardioversion on 12/30 with cardiology. 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 s/p L AKA closure (3/1), s/p ileostomy reversal (3/7). Patient with noted coffee ground/dark red drainage from NGT, Hgb found to be 4 and 5, upgraded to SICU for HD monitoring.  Patient is tachycardic but not on pressors or intubated, in ICU. Febrile majority of day with WBC to 35k. IR reconsulted for right lower abdominal wall collection aspiration vs drainage. Patient is now s/p upsizing of the two LUQ drains on 3/24 to 14 Fr (white) and 16 Fr (green) as well as placement of the RUQ drain on 3/24. Patient still with the LUQ drain from 3/20.    -LUQ anterior (white) OLGA#1 with 44 cc of  serous output over 24 hrs. 150 cc during the prior 24 hrs.  -LUQ lateral (green) OLGA #2 with 96 cc of serosanguinous output over 24 hrs. 290 cc during the prior 24 hrs.   -RUQ subhepatic drain OLGA #4 with 292 cc of serosanguinoust output over 24 hrs (since placement).  -Pelvic drain OLGA #3 with 189 cc of seropurulent output over 24 hrs. 170 cc during the prior 24 hrs.  -All drains flushed with 3 cc of sterile saline with no resistance.  -Continue to monitor output daily

## 2023-03-25 NOTE — PROGRESS NOTE ADULT - ASSESSMENT
77M PMHx of IVDU found unresponsive at his nursing home, resolved after Narcan in the field and brought to [facility]. Found to have PE, atrial flutter, and large LV thrombus on echo. Transferred to [facility] for further management. Pt c/o abdominal pain on 12/10 CTA showing mid SMA with embolus. Abnormal distal small bowel loops and cecum with dilatation and pneumatosis suggesting infarcted bowel. Now s/p Ex lap, SMA embolectomy, 80cm SBR, abthera vac left in discontinuity (12/11). S/p second look (12/13), s/p OR for 3rd look, end-to-end anastomosis of remaining bowel, loop ileostomy and abdomen closure (12/15). S/p LORENZO and Cardioversion on 12/30 with cardiology. Patient was nutritionally optimized. 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 s/p L AKA closure (3/1), s/p ileostomy reversal (3/7) c/b ileus. Course c/b upper GI bleeding 3/13 w/ coffee ground emesis (resolved), sepsis 2/2 intra-abdominal abscesses. Now s/p IR drainage w/ 2 drains in the LUQ and 1 in the pelvis w/ 750 cc total output (3/20).    Neuro: Pain: LidoDerm, Tylenol PRN, Dilaudid PRN. Nausea: Zofran PRN. Depression/Appetite: Mirtazapine.   ENT: Cepacol for sore throat  CV: Tachycardic 2/2 sepsis. Repeat echo with EF (02/11) 55-60%. Mildly dilated RV. Maintain MAP >65. A.Fib/Flutter: s/p cardioversion on 12/30. Cont metoprolol and IV amiodarone; hx HTN: holding spironolactone, Entresto.  Pulm: Comfortable on RA  GI/FEN: s/p ostomy reversal (3/7). Upper GI bleed (resolved): on PPI BID. SBO/ileus: NGT to LIWS, NPO. PICC w TPN. VitC, MTV, Zinc, Octreotide in TPN.  : Voiding, Strict I and O.   Endo: mISS  Heme: LV thrombus w/ LLE ischemia: Heparin gtt held after GI bleed, resumed (3/17-3/20, 3/21-3/24, 3/25-). Active T&S.  ID: Sepsis 2/2 intra-abdominal abscesses, s/p IR drains x3 (3/20) growing MDR Klebsiella pneumoniae, staph haemolyticus, VRE faecium, parabacteroides goldsteinii. LLE ischemia s/p L AKA 2/15 // DC: tigecycline (3/16-3/23), Caspo (3/14-3/23), Daptomycin 2/11-3/9; 3/10-16, 3/22-3/23), Zosyn, Fluconazole (3/9-13), Erta (2/13-3/10; 3/13), Tobra (3/13-16), Luis (2/10-12, 3/13-16), Vancomycin (2/10-2/11, 3/20-22). ID recommending d/c all abx - futile/no options given resistance  PPX: SCDs, hold hep gtt post IR  L/D/T: PIV, LUQ drains x2, LLQ drain, RUQ x 1 (4 total)  PT/OT: Not ordered  Dispo: SICU

## 2023-03-25 NOTE — PROGRESS NOTE ADULT - SUBJECTIVE AND OBJECTIVE BOX
STATUS POST:  12/11: Ex lap, SMA embolectomy, 80cm SBR, abthera vac  12/13: Second look, SBR  12/15: Ex-lap, no dead gut, DANIEL of discontinuous gut, loop ileostomy; EBL minimal, 1L crystalloid, UOP 150mL   12/30: LORENZO & Cardioversion on 12/30.   2/15: L AKA guillotine, EBL 10cc, IVF 400cc,   3/1: L AKA closure.   3/7: Ileostomy reversal. EBL 20, ,   3/20: IR drain x3    Interval events: Febrile 100.4, RUQ drain added, drains upsized    SUBJECTIVE: Patient seen and examined bedside by chief resident. Patient laying in bed in NAD. Minimal participation to exam but notes feeling about the same.    aMIOdarone Infusion 0.5 mG/Min IV Continuous <Continuous>  heparin  Infusion 1600 Unit(s)/Hr IV Continuous <Continuous>  metoprolol tartrate Injectable 5 milliGRAM(s) IV Push every 6 hours      Vital Signs Last 24 Hrs  T(C): 37.8 (25 Mar 2023 17:55), Max: 38.1 (24 Mar 2023 21:04)  T(F): 100 (25 Mar 2023 17:55), Max: 100.6 (24 Mar 2023 21:04)  HR: 83 (25 Mar 2023 16:00) (71 - 87)  BP: 129/75 (25 Mar 2023 16:00) (113/68 - 145/77)  BP(mean): 96 (25 Mar 2023 16:00) (80 - 104)  RR: 11 (25 Mar 2023 16:00) (11 - 27)  SpO2: 99% (25 Mar 2023 17:00) (95% - 99%)    Parameters below as of 24 Mar 2023 22:00  Patient On (Oxygen Delivery Method): room air      I&O's Detail    24 Mar 2023 07:01  -  25 Mar 2023 07:00  --------------------------------------------------------  IN:    Amiodarone: 400.8 mL    Fat Emulsion (Fish Oil &amp; Plant Based) 20% Infusion: 41.7 mL    Fat Emulsion (Fish Oil &amp; Plant Based) 20% Infusion: 83.2 mL    Heparin: 32 mL    IV PiggyBack: 100 mL    TPN (Total Parenteral Nutrition): 1512 mL  Total IN: 2169.7 mL    OUT:    Bulb (mL): 292 mL    Bulb (mL): 189 mL    Drain (mL): 44 mL    Drain (mL): 96 mL    Nasogastric/Oral tube (mL): 350 mL    Voided (mL): 2245 mL  Total OUT: 3216 mL    Total NET: -1046.3 mL      25 Mar 2023 07:01  -  25 Mar 2023 18:29  --------------------------------------------------------  IN:    Amiodarone: 116.9 mL    TPN (Total Parenteral Nutrition): 441 mL  Total IN: 557.9 mL    OUT:    Bulb (mL): 10 mL    Bulb (mL): 30 mL    Drain (mL): 20 mL    Drain (mL): 5 mL    Incontinent per Condom Catheter (mL): 300 mL    Nasogastric/Oral tube (mL): 100 mL    Voided (mL): 200 mL  Total OUT: 665 mL    Total NET: -107.1 mL          General: NAD, resting comfortably in bed  C/V: NSR  Pulm: Nonlabored breathing, no respiratory distress  Abd: soft, NT/ND. JPx4, RUQ drain serous, NAHEED/LMQ serous, LLQ fecopurulent.  Extrem: s/p L AKA      LABS:                        7.1    20.53 )-----------( 709      ( 25 Mar 2023 06:28 )             23.0     03-25    132<L>  |  100  |  12  ----------------------------<  139<H>  4.4   |  29  |  0.52    Ca    8.2<L>      25 Mar 2023 06:28  Phos  3.8     03-25  Mg     2.0     03-25    TPro  6.3  /  Alb  1.8<L>  /  TBili  0.3  /  DBili  x   /  AST  14  /  ALT  10  /  AlkPhos  140<H>  03-25    PT/INR - ( 25 Mar 2023 06:28 )   PT: 15.2 sec;   INR: 1.27          PTT - ( 25 Mar 2023 06:28 )  PTT:31.5 sec      RADIOLOGY & ADDITIONAL STUDIES:

## 2023-03-25 NOTE — PROGRESS NOTE ADULT - ASSESSMENT
77M PMHx of IVDU found unresponsive at his nursing home, resolved after Narcan in the field and brought to [facility]. Found to have PE, atrial flutter, and large LV thrombus on echo. Transferred to [facility] for further management. Pt c/o abdominal pain on 12/10 CTA showing mid SMA with embolus. Abnormal distal small bowel loops and cecum with dilatation and pneumatosis suggesting infarcted bowel. Now s/p Ex lap, SMA embolectomy, 80cm SBR, abthera vac left in discontinuity (12/11). S/p second look (12/13), s/p OR for 3rd look, end-to-end anastomosis of remaining bowel, loop ileostomy and abdomen closure (12/15). S/p LORENZO and Cardioversion on 12/30 with cardiology. Patient was nutritionally optimized. 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 s/p L AKA closure (3/1), s/p ileostomy reversal (3/7) c/b ileus. Course c/b upper GI bleeding 3/13 w/ coffee ground emesis (resolved), sepsis 2/2 intra-abdominal abscesses. Now s/p IR drainage w/ 2 drains in the LUQ and 1 in the pelvis w/ 750 cc total output (3/20).    Plans:  Continue Goals of care discussion with patient and family  Monitor Drain output  Care per SICU

## 2023-03-25 NOTE — PROGRESS NOTE ADULT - ATTENDING COMMENTS
Complex course as above with polymicrobial intraabdominal infection and hemorrhagic shock. End stage disease. Should be on home hospice. Rest of plan as per above.

## 2023-03-26 NOTE — PROGRESS NOTE ADULT - SUBJECTIVE AND OBJECTIVE BOX
STATUS POST:  Embolectomy, artery, superior mesenteric    Exploratory laparotomy    Exploratory laparotomy with small bowel resection    Small bowel resection with anastomosis    Closure of fascia of abdomen    Insertion, needle, vein    Amputation above knee    Complex reversal of ileostomy      SUBJECTIVE:   Patient seen and examined on am rounds. No new complaints. -[id number]-v-cp-sob.    Vital Signs Last 24 Hrs  T(C): 37.6 (26 Mar 2023 10:00), Max: 38.1 (26 Mar 2023 01:08)  T(F): 99.7 (26 Mar 2023 10:00), Max: 100.6 (26 Mar 2023 01:08)  HR: 79 (26 Mar 2023 09:00) (70 - 89)  BP: 120/66 (26 Mar 2023 09:00) (101/60 - 142/74)  BP(mean): 87 (26 Mar 2023 09:00) (73 - 101)  RR: 17 (26 Mar 2023 09:00) (11 - 25)  SpO2: 97% (26 Mar 2023 09:00) (94% - 99%)    Parameters below as of 26 Mar 2023 09:00  Patient On (Oxygen Delivery Method): room air        I&O's Summary    25 Mar 2023 07:01  -  26 Mar 2023 07:00  --------------------------------------------------------  IN: 2035.1 mL / OUT: 1583 mL / NET: 452.1 mL    26 Mar 2023 07:01  -  26 Mar 2023 11:28  --------------------------------------------------------  IN: 231 mL / OUT: 0 mL / NET: 231 mL        Physical Exam:  General Appearance: Appears ill  Pulmonary: Nonlabored breathing, no respiratory distress  Cardiovascular: NSR  Abdomen: Soft, nondistended, nontender. JPx4, RUQ/LUQ/LLQ serous. LLQ output serous with heterogenous flecks, improved since yesterday per chief resident.  Extremities: s/p L AKA    LABS:                        6.9    28.03 )-----------( 687      ( 26 Mar 2023 05:55 )             21.3     03-26    137  |  100  |  14  ----------------------------<  136<H>  4.2   |  29  |  0.54    Ca    7.9<L>      26 Mar 2023 05:55  Phos  3.5     03-26  Mg     2.1     03-26    TPro  6.3  /  Alb  1.8<L>  /  TBili  0.3  /  DBili  x   /  AST  14  /  ALT  10  /  AlkPhos  140<H>  03-25    PT/INR - ( 25 Mar 2023 06:28 )   PT: 15.2 sec;   INR: 1.27          PTT - ( 26 Mar 2023 05:55 )  PTT:105.6 sec

## 2023-03-26 NOTE — PROGRESS NOTE ADULT - SUBJECTIVE AND OBJECTIVE BOX
O/N: 11PM PTT 99.5, hep gtt decreased to 15, febrile to 100.6 given tylenol, hb 6.9 (7.1), 6AM .6, hep gtt decreased to 14    S: No new issues/events overnight, no new med c/o    O: ICU Vital Signs Last 24 Hrs  T(F): 99.7 (03-26-23 @ 10:00), Max: 100.6 (03-26-23 @ 01:08)  HR: 79 (03-26-23 @ 09:00) (70 - 89)  BP: 120/66 (03-26-23 @ 09:00) (101/60 - 142/74)  BP(mean): 87 (03-26-23 @ 09:00) (73 - 101)  ABP: --  RR: 17 (03-26-23 @ 09:00) (11 - 25)  SpO2: 97% (03-26-23 @ 09:00) (94% - 99%)    PHYSICAL EXAM:   General: NAD  HEENT: NC/AT, dry mucus membranes. NG tube in place with dark bilious output.  Pulmonary: Nonlabored breathing, no respiratory distress, CTAB  Cardiovascular: tachycardic, no murmurs  Abdominal: Mildly tense, non-tender without guarding or rigidity, ND, midline incision well healing, ostomy reversal site c/d. LUQ drains x2 bilious, LLQ drain feculent  Extremities: Left stump c/d/i, WWP, no clubbing/cyanosis/edema  Neuro: A/O x3, CNs II-XII grossly intact  LABS:    03-26    137  |  100  |  14  ----------------------------<  136<H>  4.2   |  29  |  0.54    Ca    7.9<L>      26 Mar 2023 05:55  Phos  3.5     03-26  Mg     2.1     03-26    TPro  6.3  /  Alb  1.8<L>  /  TBili  0.3  /  DBili  x   /  AST  14  /  ALT  10  /  AlkPhos  140<H>  03-25  LIVER FUNCTIONS - ( 25 Mar 2023 06:28 )  Alb: 1.8 g/dL / Pro: 6.3 g/dL / ALK PHOS: 140 U/L / ALT: 10 U/L / AST: 14 U/L / GGT: x                               6.9    28.03 )-----------( 687      ( 26 Mar 2023 05:55 )             21.3   PT/INR - ( 25 Mar 2023 06:28 )   PT: 15.2 sec;   INR: 1.27          PTT - ( 26 Mar 2023 05:55 )  PTT:105.6 sec  CAPILLARY BLOOD GLUCOSE      POCT Blood Glucose.: 134 mg/dL (26 Mar 2023 05:46)  POCT Blood Glucose.: 83 mg/dL (25 Mar 2023 23:07)  POCT Blood Glucose.: 129 mg/dL (25 Mar 2023 18:42)    MEDICATIONS  (STANDING):  aMIOdarone Infusion 0.5 mG/Min (16.7 mL/Hr) IV Continuous <Continuous>  chlorhexidine 2% Cloths 1 Application(s) Topical daily  dextrose 5%. 1000 milliLiter(s) (50 mL/Hr) IV Continuous <Continuous>  dextrose 5%. 1000 milliLiter(s) (100 mL/Hr) IV Continuous <Continuous>  dextrose 50% Injectable 25 Gram(s) IV Push once  dextrose 50% Injectable 12.5 Gram(s) IV Push once  dextrose 50% Injectable 25 Gram(s) IV Push once  glucagon  Injectable 1 milliGRAM(s) IntraMuscular once  heparin  Infusion 1600 Unit(s)/Hr (14 mL/Hr) IV Continuous <Continuous>  influenza  Vaccine (HIGH DOSE) 0.7 milliLiter(s) IntraMuscular once  insulin lispro (ADMELOG) corrective regimen sliding scale   SubCutaneous every 6 hours  lidocaine   4% Patch 1 Patch Transdermal daily  metoprolol tartrate Injectable 5 milliGRAM(s) IV Push every 6 hours  mirtazapine 30 milliGRAM(s) Oral at bedtime  nystatin Powder 1 Application(s) Topical two times a day  pantoprazole  Injectable 40 milliGRAM(s) IV Push every 12 hours  Parenteral Nutrition - Adult 1 Each (63 mL/Hr) TPN Continuous <Continuous>  povidone iodine 10% Solution 1 Application(s) Topical daily  sodium chloride 0.9% lock flush 10 milliLiter(s) IV Push every 8 hours    MEDICATIONS  (PRN):  benzocaine/menthol Lozenge 1 Lozenge Oral every 4 hours PRN Sore Throat  dextrose Oral Gel 15 Gram(s) Oral once PRN Blood Glucose LESS THAN 70 milliGRAM(s)/deciliter  HYDROmorphone  Injectable 0.25 milliGRAM(s) IV Push every 4 hours PRN Severe Pain (7 - 10)  HYDROmorphone  Injectable 0.5 milliGRAM(s) IV Push every 6 hours PRN for breathrough pain  ondansetron Injectable 4 milliGRAM(s) IV Push every 6 hours PRN Nausea and/or Vomiting  sodium chloride 0.9% lock flush 10 milliLiter(s) IV Push every 1 hour PRN Pre/post blood products, medications, blood draw, and to maintain line patency      Uriarte:	  [ ] None	[ ] Daily Uriarte Order Placed	   Indication:	  [ ] Strict I and O's    [ ] Obstruction     [ ] Incontinence + Stage 3 or 4 Decubitus  Central Line:  [ ] None	   [ ]  Medication / TPN Administration     [ ] No Peripheral IV

## 2023-03-26 NOTE — PROGRESS NOTE ADULT - ASSESSMENT
77M PMHx of IVDU found unresponsive at his nursing home, resolved after Narcan in the field and brought to [facility]. Found to have PE, atrial flutter, and large LV thrombus on echo. Transferred to [facility] for further management. Pt c/o abdominal pain on 12/10 CTA showing mid SMA with embolus. Abnormal distal small bowel loops and cecum with dilatation and pneumatosis suggesting infarcted bowel. Now s/p Ex lap, SMA embolectomy, 80cm SBR, abthera vac left in discontinuity (12/11). S/p second look (12/13), s/p OR for 3rd look, end-to-end anastomosis of remaining bowel, loop ileostomy and abdomen closure (12/15). S/p LORENZO and Cardioversion on 12/30 with cardiology. Patient was nutritionally optimized. 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 s/p L AKA closure (3/1), s/p ileostomy reversal (3/7) c/b ileus. Course c/b upper GI bleeding 3/13 w/ coffee ground emesis (resolved), sepsis 2/2 intra-abdominal abscesses. Now s/p IR drainage w/ 2 drains in the LUQ and 1 in the pelvis w/ 750 cc total output (3/20).    Neuro: Pain: LidoDerm, Tylenol PRN, Dilaudid PRN. Nausea: Zofran PRN. Depression/Appetite: Mirtazapine.   ENT: Cepacol for sore throat  CV: Tachycardic 2/2 sepsis. Repeat echo with EF (02/11) 55-60%. Mildly dilated RV. Maintain MAP >65. A.Fib/Flutter: s/p cardioversion on 12/30. Cont metoprolol and IV amiodarone; hx HTN: holding spironolactone, Entresto.  Pulm: Comfortable on RA  GI/FEN: s/p ostomy reversal (3/7). Upper GI bleed (resolved): on PPI BID. SBO/ileus: NGT to LIWS, NPO. PICC w TPN. VitC, MTV, Zinc, Octreotide in TPN.  : Voiding, Strict I and O.   Endo: mISS  Heme: LV thrombus w/ LLE ischemia: Heparin gtt held after GI bleed, resumed (3/17-3/20, 3/21-). Active T&S.  ID: Sepsis 2/2 intra-abdominal abscesses, s/p IR drains x3 (3/20) growing MDR Klebsiella pneumoniae, staph haemolyticus, VRE faecium, parabacteroides goldsteinii. LLE ischemia s/p L AKA 2/15 // DC: tigecycline (3/16-3/23), Caspo (3/14-3/23), Daptomycin 2/11-3/9; 3/10-16, 3/22-3/23), Zosyn, Fluconazole (3/9-13), Erta (2/13-3/10; 3/13), Tobra (3/13-16), Luis (2/10-12, 3/13-16), Vancomycin (2/10-2/11, 3/20-22). ID recommending d/c all abx - futile/no options given resistance  PPX: SCDs, hold hep gtt post IR  L/D/T: PIV, LUQ drains x2, LLQ drain, RUQ x 1 (4 total)  PT/OT: Not ordered  Dispo: SICU

## 2023-03-26 NOTE — PROGRESS NOTE ADULT - ATTENDING COMMENTS
Complex course as above with polymicrobial intraabdominal infection and hemorrhagic shock. End stage disease. Should be on home hospice. Clarify DNR/DNI status. Rest of plan as per above.

## 2023-03-26 NOTE — PROGRESS NOTE ADULT - ASSESSMENT
77M PMHx of IVDU found unresponsive at his nursing home, resolved after Narcan in the field and brought to [facility]. Found to have PE, atrial flutter, and large LV thrombus on echo. Transferred to [facility] for further management. Pt c/o abdominal pain on 12/10 CTA showing mid SMA with embolus. Abnormal distal small bowel loops and cecum with dilatation and pneumatosis suggesting infarcted bowel. Now s/p Ex lap, SMA embolectomy, 80cm SBR, abthera vac left in discontinuity (12/11). S/p second look (12/13), s/p OR for 3rd look, end-to-end anastomosis of remaining bowel, loop ileostomy and abdomen closure (12/15). S/p LORENZO and Cardioversion on 12/30 with cardiology. Patient was nutritionally optimized. 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 s/p L AKA closure (3/1), s/p ileostomy reversal (3/7) c/b ileus. Course c/b upper GI bleeding 3/13 w/ coffee ground emesis (resolved), sepsis 2/2 intra-abdominal abscesses. Now s/p IR drainage w/ 2 drains in the LUQ and 1 in the pelvis w/ 750 cc total output (3/20).    Plans:  Continue Goals of care discussion with patient and family  1u pRBC  Monitor Drain output  Care per SICU

## 2023-03-27 NOTE — PROGRESS NOTE ADULT - PROBLEM SELECTOR PLAN 5
.  2/2 intra-abdominal abscesses, s/p IR drains x3 (3/20) growing MDR Klebsiella pneumoniae, staph haemolyticus, VRE faecium, parabacteroides goldsteinii. LLE ischemia s/p L AKA 2/15 // DC: tigecycline (3/16-3/23), Caspo (3/14-3/23), Daptomycin 2/11-3/9; 3/10-16, 3/22-3/23), Zosyn, Fluconazole (3/9-13), Erta (2/13-3/10; 3/13), Tobra (3/13-16), Luis (2/10-12, 3/13-16), Vancomycin (2/10-2/11, 3/20-22).   - 3/23 ID recommending d/c all abx - futile/no options given resistance; prognosis is poor   - c as above, continue management of drains per surgery, trial of pressors within GOC to allow family time to arrive at bedside

## 2023-03-27 NOTE — PROGRESS NOTE ADULT - ASSESSMENT
78M PMHx of IVDU found unresponsive at his nursing home, resolved after Narcan in the field and brought to [facility]. Found to have PE, atrial flutter, and large LV thrombus on echo. Transferred 12/7 to [facility] for further management. Pt c/o abdominal pain on 12/10 CTA showing mid SMA with embolus. Abnormal distal small bowel loops and cecum with dilatation and pneumatosis suggesting infarcted bowel, s/p Ex lap, SMA embolectomy, 80cm SBR, abthera vac left in discontinuity (12/11). S/p second look (12/13), s/p OR for 3rd look, end-to-end anastomosis of remaining bowel, loop ileostomy and abdomen closure (12/15). S/p LORENZO and Cardioversion on 12/30 with cardiology. Patient was nutritionally optimized and underwent L AKA guillotine (2/15) with vascular. s/p L AKA closure (3/1), s/p ileostomy reversal (3/7) c/b ileus. Course c/b upper GI bleeding 3/13 w/ coffee ground emesis (resolved). Week of 3/20 sepsis 2/2 intra-abdominal abscesses, s/p IR drainage w/ 2 drains in the LUQ and 1 in the pelvis, abx discontinued given multidrug resistance / futility Palliative following peripherally this admission for GOC.

## 2023-03-27 NOTE — PROGRESS NOTE ADULT - SUBJECTIVE AND OBJECTIVE BOX
ON: 1800 PTT 56, Hep@14.5, drains unchanged, MN PTT 81.3, no change  3/26: 1U pRBC ordered. SDU possibly tomorrow. PTT @12:77.6- no change repeat @6 .     SUBJECTIVE: Patient seen and examined bedside; sleepy, didn't want to participate in exam, refers feeling tired and would like to sleep, otherwise no complaints.    aMIOdarone Infusion 0.5 mG/Min IV Continuous <Continuous>  enoxaparin Injectable 60 milliGRAM(s) SubCutaneous every 12 hours  metoprolol tartrate Injectable 5 milliGRAM(s) IV Push every 6 hours      Vital Signs Last 24 Hrs  T(C): 36.4 (27 Mar 2023 09:00), Max: 37.7 (26 Mar 2023 17:55)  T(F): 97.5 (27 Mar 2023 09:00), Max: 99.9 (26 Mar 2023 17:55)  HR: 75 (27 Mar 2023 11:00) (74 - 93)  BP: 143/81 (27 Mar 2023 11:00) (122/72 - 149/87)  BP(mean): 105 (27 Mar 2023 11:00) (91 - 113)  RR: 16 (27 Mar 2023 11:00) (12 - 20)  SpO2: 99% (27 Mar 2023 11:00) (96% - 100%)    Parameters below as of 27 Mar 2023 11:00  Patient On (Oxygen Delivery Method): room air      I&O's Detail    26 Mar 2023 07:01  -  27 Mar 2023 07:00  --------------------------------------------------------  IN:    Amiodarone: 400.8 mL    Fat Emulsion (Fish Oil &amp; Plant Based) 20% Infusion: 62.4 mL    Fat Emulsion (Fish Oil &amp; Plant Based) 20% Infusion: 231 mL    Heparin: 344 mL    PRBCs (Packed Red Blood Cells): 350 mL    TPN (Total Parenteral Nutrition): 1386 mL  Total IN: 2774.2 mL    OUT:    Bulb (mL): 34 mL    Bulb (mL): 32 mL    Drain (mL): 9 mL    Drain (mL): 13 mL    Nasogastric/Oral tube (mL): 430 mL    Voided (mL): 1700 mL  Total OUT: 2218 mL    Total NET: 556.2 mL      27 Mar 2023 07:01  -  27 Mar 2023 12:32  --------------------------------------------------------  IN:    Amiodarone: 66.8 mL    Fat Emulsion (Fish Oil &amp; Plant Based) 20% Infusion: 38 mL    Heparin: 40.5 mL    TPN (Total Parenteral Nutrition): 252 mL  Total IN: 397.3 mL    OUT:    Voided (mL): 600 mL  Total OUT: 600 mL    Total NET: -202.7 mL      PE:    General: NAD, resting comfortably in bed  HEENT: NGT appropriately positioned, easily flushed, minimal clear brown fluid in canister  C/V: S1 s2, NSR  Pulm: Nonlabored breathing, no respiratory distress  Abd: Soft, NTND, LUQ drains clear, Right OLGA serosanguinous, Pelvic OLGA seropurulent  Extrem: Left leg wrapped with ace        LABS:                        8.3    28.53 )-----------( 713      ( 27 Mar 2023 05:30 )             25.7     03-27    136  |  100  |  11  ----------------------------<  153<H>  3.9   |  29  |  0.50    Ca    8.0<L>      27 Mar 2023 05:30  Phos  3.2     03-27  Mg     2.0     03-27      PTT - ( 27 Mar 2023 05:30 )  PTT:108.3 sec      RADIOLOGY & ADDITIONAL STUDIES:

## 2023-03-27 NOTE — PROGRESS NOTE ADULT - AGENT'S NAME
Sophie Strange (sister) [phone] (cell)  [phone]
Sophie Aleman (Sister) [phone] (cell)  [phone]
Sophie Strange

## 2023-03-27 NOTE — PROGRESS NOTE ADULT - NS PRO AD PATIENT TYPE
Health Care Proxy (HCP)/Do Not Resuscitate (DNR)/Medical Orders for Life-Sustaining Treatment (MOLST)
Health Care Proxy (HCP)
DNR DNI/Health Care Proxy (HCP)/Medical Orders for Life-Sustaining Treatment (MOLST)

## 2023-03-27 NOTE — PROGRESS NOTE ADULT - PROBLEM SELECTOR PLAN 2
.  alb 1.8  Clinical evidence indicates that the patient has Severe protein calorie malnutrition/ 3rd degree  In context of     Acute Illness/Injury (>7days)    vs    Chronic Illness (>1 month)    Energy/Food intake <50% of estimated energy requirement >5 days  Weight loss: Moderate - severe  Body Fat loss: Severe   +Cachexia, temporal wasting, muscle atrophy  Muscle mass loss: Severe  +/pressure ulcers  Fluid Accumulation: Severe +, pleural effusions   Strength: weakened severe (bedbound)    Recommend:   - cw of NG tube feeds, tpn -- within GoC

## 2023-03-27 NOTE — PROGRESS NOTE ADULT - SUBJECTIVE AND OBJECTIVE BOX
SUBJECTIVE/OBJECTIVE: fatigued, tired. no significant abdominal pain or discomfort, but endorses intermittent R rib, R chest pain. multiple GOC discussions with pt, family, Dr. Lanier over the last week, all understand that pts prognosis is poor and further limited after abx were discontinued dt futility/multidrug resistance. goc are to continue managing drains, tpn, aggressive care, brief trial of pressors (with the goal to allow family to be/arrive at bedside). when pt decompensates and death is imminent, goals will transition to symptom directed care with liberalized prn opioids/anxiolytics to ensure comfort through the dying process. pt is already DNR DNI.     ALLERGIES: No Known Allergies      MEDICATIONS: REVIEWED  MEDICATIONS  (STANDING):  aMIOdarone Infusion 0.5 mG/Min (16.7 mL/Hr) IV Continuous <Continuous>  chlorhexidine 2% Cloths 1 Application(s) Topical daily  dextrose 5%. 1000 milliLiter(s) (100 mL/Hr) IV Continuous <Continuous>  dextrose 5%. 1000 milliLiter(s) (50 mL/Hr) IV Continuous <Continuous>  dextrose 50% Injectable 25 Gram(s) IV Push once  dextrose 50% Injectable 12.5 Gram(s) IV Push once  dextrose 50% Injectable 25 Gram(s) IV Push once  enoxaparin Injectable 60 milliGRAM(s) SubCutaneous every 12 hours  fat emulsion (Fish Oil and Plant Based) 20% Infusion 0.7764 Gm/kG/Day (20.83 mL/Hr) IV Continuous <Continuous>  glucagon  Injectable 1 milliGRAM(s) IntraMuscular once  influenza  Vaccine (HIGH DOSE) 0.7 milliLiter(s) IntraMuscular once  insulin lispro (ADMELOG) corrective regimen sliding scale   SubCutaneous every 6 hours  lidocaine   4% Patch 1 Patch Transdermal daily  metoprolol tartrate Injectable 5 milliGRAM(s) IV Push every 6 hours  mirtazapine 30 milliGRAM(s) Oral at bedtime  nystatin Powder 1 Application(s) Topical two times a day  pantoprazole  Injectable 40 milliGRAM(s) IV Push every 12 hours  Parenteral Nutrition - Adult 1 Each (63 mL/Hr) TPN Continuous <Continuous>  povidone iodine 10% Solution 1 Application(s) Topical daily  sodium chloride 0.9% lock flush 10 milliLiter(s) IV Push every 8 hours    MEDICATIONS  (PRN):  benzocaine/menthol Lozenge 1 Lozenge Oral every 4 hours PRN Sore Throat  dextrose Oral Gel 15 Gram(s) Oral once PRN Blood Glucose LESS THAN 70 milliGRAM(s)/deciliter  HYDROmorphone  Injectable 0.25 milliGRAM(s) IV Push every 4 hours PRN Severe Pain (7 - 10)  HYDROmorphone  Injectable 0.5 milliGRAM(s) IV Push every 6 hours PRN for breathrough pain  ondansetron Injectable 4 milliGRAM(s) IV Push every 6 hours PRN Nausea and/or Vomiting  sodium chloride 0.9% lock flush 10 milliLiter(s) IV Push every 1 hour PRN Pre/post blood products, medications, blood draw, and to maintain line patency      Analgesic Use (Scheduled and PRNs) for past 24 hours (6a-6a):    PEx:  General: chronically ill, weak appearing man lying in bed , withdrawn  alert  oriented x   3    lethargic, cachexia verbal  Behavioral: withdrawn  HEENT: atraumatic,  ++temporal wasting,  No dry mouth +ngt  RESP: Reg rhythm, No  tachypnea/labored breathing,  No audible excessive secretions,  CTAB, diminished bilat bases  CV: RRR, S1S2,  No  tachycardia  GI: soft non distended tender  incontinent +multiple drains: LUQ drains clear, Right OLAG serosanguinous, Pelvic OLGA seropurulent  :   bowen  MUSK: weakness x4, bed/chair bound, Left leg wrapped with ace  SKIN: No abnormal skin lesions, Poor skin turgor  NEURO:  No deficits, cognitive impairment, encephalopathic dsyphagia dysarthria paresis  Oral intake ability: unable/only mouth care      T(C): 37.3 (03-28-23 @ 05:00), Max: 37.3 (03-28-23 @ 05:00)  HR: 86 (03-28-23 @ 03:33) (75 - 86)  BP: 153/85 (03-28-23 @ 03:33) (131/77 - 161/85)  RR: 18 (03-28-23 @ 03:33) (16 - 18)  SpO2: 97% (03-28-23 @ 03:33) (95% - 100%)  Wt(kg): --      LABS: REVIEWED  CBC:                        9.0    26.67 )-----------( 428      ( 28 Mar 2023 05:30 )             28.4     CMP:    03-28    134<L>  |  100  |  12  ----------------------------<  150<H>  4.0   |  27  |  0.50    Ca    8.5      28 Mar 2023 05:30  Phos  3.2     03-28  Mg     2.0     03-28        RADIOLOGY & ADDITIONAL STUDIES:     DISCUSSION OF CASE:  - dw family and Dr. Lanier        SUBJECTIVE/OBJECTIVE: fatigued, tired. no significant abdominal pain or discomfort, but endorses intermittent R rib, R chest pain. multiple GOC discussions with pt, family, Dr. Lanier over the last week-- see note below.     ALLERGIES: No Known Allergies      MEDICATIONS: REVIEWED  MEDICATIONS  (STANDING):  aMIOdarone Infusion 0.5 mG/Min (16.7 mL/Hr) IV Continuous <Continuous>  chlorhexidine 2% Cloths 1 Application(s) Topical daily  dextrose 5%. 1000 milliLiter(s) (100 mL/Hr) IV Continuous <Continuous>  dextrose 5%. 1000 milliLiter(s) (50 mL/Hr) IV Continuous <Continuous>  dextrose 50% Injectable 25 Gram(s) IV Push once  dextrose 50% Injectable 12.5 Gram(s) IV Push once  dextrose 50% Injectable 25 Gram(s) IV Push once  enoxaparin Injectable 60 milliGRAM(s) SubCutaneous every 12 hours  fat emulsion (Fish Oil and Plant Based) 20% Infusion 0.7764 Gm/kG/Day (20.83 mL/Hr) IV Continuous <Continuous>  glucagon  Injectable 1 milliGRAM(s) IntraMuscular once  influenza  Vaccine (HIGH DOSE) 0.7 milliLiter(s) IntraMuscular once  insulin lispro (ADMELOG) corrective regimen sliding scale   SubCutaneous every 6 hours  lidocaine   4% Patch 1 Patch Transdermal daily  metoprolol tartrate Injectable 5 milliGRAM(s) IV Push every 6 hours  mirtazapine 30 milliGRAM(s) Oral at bedtime  nystatin Powder 1 Application(s) Topical two times a day  pantoprazole  Injectable 40 milliGRAM(s) IV Push every 12 hours  Parenteral Nutrition - Adult 1 Each (63 mL/Hr) TPN Continuous <Continuous>  povidone iodine 10% Solution 1 Application(s) Topical daily  sodium chloride 0.9% lock flush 10 milliLiter(s) IV Push every 8 hours    MEDICATIONS  (PRN):  benzocaine/menthol Lozenge 1 Lozenge Oral every 4 hours PRN Sore Throat  dextrose Oral Gel 15 Gram(s) Oral once PRN Blood Glucose LESS THAN 70 milliGRAM(s)/deciliter  HYDROmorphone  Injectable 0.25 milliGRAM(s) IV Push every 4 hours PRN Severe Pain (7 - 10)  HYDROmorphone  Injectable 0.5 milliGRAM(s) IV Push every 6 hours PRN for breathrough pain  ondansetron Injectable 4 milliGRAM(s) IV Push every 6 hours PRN Nausea and/or Vomiting  sodium chloride 0.9% lock flush 10 milliLiter(s) IV Push every 1 hour PRN Pre/post blood products, medications, blood draw, and to maintain line patency      Analgesic Use (Scheduled and PRNs) for past 24 hours (6a-6a):    PEx:  General: chronically ill, weak appearing man lying in bed , withdrawn  alert  oriented x   3    lethargic, cachexia verbal  Behavioral: withdrawn  HEENT: atraumatic,  ++temporal wasting,  No dry mouth +ngt  RESP: Reg rhythm, No  tachypnea/labored breathing,  No audible excessive secretions,  CTAB, diminished bilat bases  CV: RRR, S1S2,  No  tachycardia  GI: soft non distended tender  incontinent +multiple drains: LUQ drains clear, Right OLGA serosanguinous, Pelvic OLGA seropurulent  :   bowen  MUSK: weakness x4, bed/chair bound, Left leg wrapped with ace  SKIN: No abnormal skin lesions, Poor skin turgor  NEURO:  No deficits, cognitive impairment, encephalopathic dsyphagia dysarthria paresis  Oral intake ability: unable/only mouth care      T(C): 37.3 (03-28-23 @ 05:00), Max: 37.3 (03-28-23 @ 05:00)  HR: 86 (03-28-23 @ 03:33) (75 - 86)  BP: 153/85 (03-28-23 @ 03:33) (131/77 - 161/85)  RR: 18 (03-28-23 @ 03:33) (16 - 18)  SpO2: 97% (03-28-23 @ 03:33) (95% - 100%)  Wt(kg): --      LABS: REVIEWED  CBC:                        9.0    26.67 )-----------( 428      ( 28 Mar 2023 05:30 )             28.4     CMP:    03-28    134<L>  |  100  |  12  ----------------------------<  150<H>  4.0   |  27  |  0.50    Ca    8.5      28 Mar 2023 05:30  Phos  3.2     03-28  Mg     2.0     03-28        RADIOLOGY & ADDITIONAL STUDIES:     DISCUSSION OF CASE:  - dw family and Dr. Lanier

## 2023-03-27 NOTE — PROGRESS NOTE ADULT - ATTENDING COMMENTS
Patient seen and examined with house-staff during bedside rounds.  Resident note read, including vitals, physical findings, laboratory data, and radiological reports.   Revisions included below.  Direct personal management at bed side and extensive interpretation of the data.  Plan was outlined and discussed in details with the housestaff.  Decision making of high complexity  Action taken for acute disease activity to reflect the level of care provided:  - medication reconciliation  - review laboratory data  Is clinically stable.  Continue drain.  Off antibiotic.  Follow-up with ID.  Follow-up palliative medicine to follow-up.  Patient is stable to be transferred to the floor

## 2023-03-27 NOTE — PROGRESS NOTE ADULT - PROBLEM SELECTOR PLAN 1
.  intermittently with abdominal pain and discomfort. has tolerated dil IVP without distressing symptoms throughout this admission.   - recommend dilaudid 0.5-1 mg IVP q4 PRN severe pain  - can consider pat solution 100 mg QHS via NGT   - if/when pt decompensates and death is imminent, please order dil 1 mg IVP q2 PRN pain or RR>20, ativan 1 mg IVP q2 PRN anxiety and maintain a low threshold to repeat dose or increase PRN frequency as needed to maximize comfort through the end of the patient's life

## 2023-03-27 NOTE — PROGRESS NOTE ADULT - SUBJECTIVE AND OBJECTIVE BOX
Pt seen and examined. All drains flushed with 3 cc of sterile saline. Dressing c/d.    - LUQ anterior (white) OLGA#1 with 13 cc of  serosanguinous output over 24 hrs. 22 cc during the prior 24 hrs.  -LUQ lateral (green) OLGA #2 with 9 cc of serosanguinous output over 24 hrs. 44 cc during the prior 24 hrs.   -Pelvic drain OLGA #3 with 32 cc of seropurulent output over 24 hrs. 74 cc during the prior 24 hrs.  -RUQ subhepatic drain OLGA #4 with 34cc of serous output over 24 hrs. Previously 48cc prior 24 hr period

## 2023-03-27 NOTE — PROGRESS NOTE ADULT - SUBJECTIVE AND OBJECTIVE BOX
Interval Events:  PTT Adjusted overnight   Patient seen and examined at bedside.      Allergies    No Known Allergies    Intolerances        Vital Signs Last 24 Hrs  T(C): 36.7 (27 Mar 2023 06:05), Max: 37.7 (26 Mar 2023 17:55)  T(F): 98.1 (27 Mar 2023 06:05), Max: 99.9 (26 Mar 2023 17:55)  HR: 76 (27 Mar 2023 07:00) (74 - 93)  BP: 133/74 (27 Mar 2023 07:00) (110/66 - 149/87)  BP(mean): 98 (27 Mar 2023 07:00) (84 - 113)  RR: 16 (27 Mar 2023 07:00) (12 - 22)  SpO2: 97% (27 Mar 2023 07:00) (96% - 100%)    Parameters below as of 27 Mar 2023 08:00  Patient On (Oxygen Delivery Method): room air        03-26 @ 07:01  -  03-27 @ 07:00  --------------------------------------------------------  IN: 2774.2 mL / OUT: 2218 mL / NET: 556.2 mL    03-27 @ 07:01 - 03-27 @ 07:50  --------------------------------------------------------  IN: 112.2 mL / OUT: 0 mL / NET: 112.2 mL      03-26 @ 07:01 - 03-27 @ 07:00  --------------------------------------------------------  IN: 2774.2 mL / OUT: 2218 mL / NET: 556.2 mL    03-27 @ 07:01  -  03-27 @ 07:50  --------------------------------------------------------  IN: 112.2 mL / OUT: 0 mL / NET: 112.2 mL        Physical Exam:   General: NAD  Neuro: A/O x3, CNs II-XII grossly intact  HEENT: NC/AT, dry mucus membranes. NG tube in place with dark bilious output.  Pulmonary: Nonlabored breathing, no respiratory distress, CTAB  Cardiovascular: tachycardic, no murmurs  Abdominal: Mildly tense, non-tender without guarding or rigidity, ND, midline incision well healing, ostomy reversal site c/d. LUQ drains x2 bilious, LLQ drain feculent  Extremities: Left stump c/d/i, WWP, no clubbing/cyanosis/edema        LABS:      CBC Full  -  ( 27 Mar 2023 05:30 )  WBC Count : 28.53 K/uL  RBC Count : 2.90 M/uL  Hemoglobin : 8.3 g/dL  Hematocrit : 25.7 %  Platelet Count - Automated : 713 K/uL  Mean Cell Volume : 88.6 fl  Mean Cell Hemoglobin : 28.6 pg  Mean Cell Hemoglobin Concentration : 32.3 gm/dL  Auto Neutrophil # : x  Auto Lymphocyte # : x  Auto Monocyte # : x  Auto Eosinophil # : x  Auto Basophil # : x  Auto Neutrophil % : x  Auto Lymphocyte % : x  Auto Monocyte % : x  Auto Eosinophil % : x  Auto Basophil % : x    03-27    136  |  100  |  11  ----------------------------<  153<H>  3.9   |  29  |  0.50    Ca    8.0<L>      27 Mar 2023 05:30  Phos  3.2     03-27  Mg     2.0     03-27      PTT - ( 27 Mar 2023 05:30 )  PTT:108.3 sec                RADIOLOGY & ADDITIONAL STUDIES (The following images were personally reviewed):          A/p: 77M PMHx of IVDU found unresponsive at his nursing home, resolved after Narcan in the field and brought to [facility]. Found to have PE, atrial flutter, and large LV thrombus on echo. Transferred to [facility] for further management. Pt c/o abdominal pain on 12/10 CTA showing mid SMA with embolus. Abnormal distal small bowel loops and cecum with dilatation and pneumatosis suggesting infarcted bowel. Now s/p Ex lap, SMA embolectomy, 80cm SBR, abthera vac left in discontinuity (12/11). S/p second look (12/13), s/p OR for 3rd look, end-to-end anastomosis of remaining bowel, loop ileostomy and abdomen closure (12/15). S/p LORENZO and Cardioversion on 12/30 with cardiology. Patient was nutritionally optimized. 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 s/p L AKA closure (3/1), s/p ileostomy reversal (3/7) c/b ileus. Course c/b upper GI bleeding 3/13 w/ coffee ground emesis (resolved), sepsis 2/2 intra-abdominal abscesses. Now s/p IR drainage w/ 2 drains in the LUQ and 1 in the pelvis w/ 750 cc total output (3/20).    Neuro: Pain: LidoDerm, Tylenol PRN, Dilaudid PRN. Nausea: Zofran PRN. Depression/Appetite: Mirtazapine.   ENT: Cepacol for sore throat  CV: Tachycardic 2/2 sepsis. Repeat echo with EF (02/11) 55-60%. Mildly dilated RV. Maintain MAP >65. A.Fib/Flutter: s/p cardioversion on 12/30. Cont metoprolol and IV amiodarone; hx HTN: holding spironolactone, Entresto.  Pulm: Comfortable on RA  GI/FEN: s/p ostomy reversal (3/7). Upper GI bleed (resolved): on PPI BID. SBO/ileus: NGT to LIWS, NPO. PICC w TPN. VitC, MTV, Zinc, Octreotide in TPN.  : Voiding, Strict I and O.   Endo: mISS  Heme: LV thrombus w/ LLE ischemia: Heparin gtt held after GI bleed, resumed (3/17-3/20, 3/21-). Active T&S.  ID: Sepsis 2/2 intra-abdominal abscesses, s/p IR drains x3 (3/20) growing MDR Klebsiella pneumoniae, staph haemolyticus, VRE faecium, parabacteroides goldsteinii. LLE ischemia s/p L AKA 2/15 // DC: tigecycline (3/16-3/23), Caspo (3/14-3/23), Daptomycin 2/11-3/9; 3/10-16, 3/22-3/23), Zosyn, Fluconazole (3/9-13), Erta (2/13-3/10; 3/13), Tobra (3/13-16), Luis (2/10-12, 3/13-16), Vancomycin (2/10-2/11, 3/20-22). ID recommending d/c all abx - futile/no options given resistance  PPX: SCDs, hold hep gtt post IR  L/D/T: PIV, LUQ drains x2, LLQ drain, RUQ x 1 (4 total)  PT/OT: Ordered  Dispo: SDU   Interval Events:  PTT Adjusted overnight   Patient seen and examined at bedside.      Allergies    No Known Allergies    Intolerances        Vital Signs Last 24 Hrs  T(C): 36.7 (27 Mar 2023 06:05), Max: 37.7 (26 Mar 2023 17:55)  T(F): 98.1 (27 Mar 2023 06:05), Max: 99.9 (26 Mar 2023 17:55)  HR: 76 (27 Mar 2023 07:00) (74 - 93)  BP: 133/74 (27 Mar 2023 07:00) (110/66 - 149/87)  BP(mean): 98 (27 Mar 2023 07:00) (84 - 113)  RR: 16 (27 Mar 2023 07:00) (12 - 22)  SpO2: 97% (27 Mar 2023 07:00) (96% - 100%)    Parameters below as of 27 Mar 2023 08:00  Patient On (Oxygen Delivery Method): room air        03-26 @ 07:01  -  03-27 @ 07:00  --------------------------------------------------------  IN: 2774.2 mL / OUT: 2218 mL / NET: 556.2 mL    03-27 @ 07:01 - 03-27 @ 07:50  --------------------------------------------------------  IN: 112.2 mL / OUT: 0 mL / NET: 112.2 mL      03-26 @ 07:01 - 03-27 @ 07:00  --------------------------------------------------------  IN: 2774.2 mL / OUT: 2218 mL / NET: 556.2 mL    03-27 @ 07:01  -  03-27 @ 07:50  --------------------------------------------------------  IN: 112.2 mL / OUT: 0 mL / NET: 112.2 mL        Physical Exam:   General: NAD  Neuro: A/O x3, CNs II-XII grossly intact  HEENT: NC/AT, dry mucus membranes. NG tube in place with dark bilious output.  Pulmonary: Nonlabored breathing, no respiratory distress, CTAB  Cardiovascular: tachycardic, no murmurs  Abdominal: Mildly tense, non-tender without guarding or rigidity, ND, midline incision well healing, ostomy reversal site c/d. LUQ drains x2 bilious, LLQ drain feculent  Extremities: Left stump c/d/i, WWP, no clubbing/cyanosis/edema        LABS:      CBC Full  -  ( 27 Mar 2023 05:30 )  WBC Count : 28.53 K/uL  RBC Count : 2.90 M/uL  Hemoglobin : 8.3 g/dL  Hematocrit : 25.7 %  Platelet Count - Automated : 713 K/uL  Mean Cell Volume : 88.6 fl  Mean Cell Hemoglobin : 28.6 pg  Mean Cell Hemoglobin Concentration : 32.3 gm/dL  Auto Neutrophil # : x  Auto Lymphocyte # : x  Auto Monocyte # : x  Auto Eosinophil # : x  Auto Basophil # : x  Auto Neutrophil % : x  Auto Lymphocyte % : x  Auto Monocyte % : x  Auto Eosinophil % : x  Auto Basophil % : x    03-27    136  |  100  |  11  ----------------------------<  153<H>  3.9   |  29  |  0.50    Ca    8.0<L>      27 Mar 2023 05:30  Phos  3.2     03-27  Mg     2.0     03-27      PTT - ( 27 Mar 2023 05:30 )  PTT:108.3 sec                RADIOLOGY & ADDITIONAL STUDIES (The following images were personally reviewed):          A/p: 77M PMHx of IVDU found unresponsive at his nursing home, resolved after Narcan in the field and brought to [facility]. Found to have PE, atrial flutter, and large LV thrombus on echo. Transferred to [facility] for further management. Pt c/o abdominal pain on 12/10 CTA showing mid SMA with embolus. Abnormal distal small bowel loops and cecum with dilatation and pneumatosis suggesting infarcted bowel. Now s/p Ex lap, SMA embolectomy, 80cm SBR, abthera vac left in discontinuity (12/11). S/p second look (12/13), s/p OR for 3rd look, end-to-end anastomosis of remaining bowel, loop ileostomy and abdomen closure (12/15). S/p LORENZO and Cardioversion on 12/30 with cardiology. Patient was nutritionally optimized. 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 s/p L AKA closure (3/1), s/p ileostomy reversal (3/7) c/b ileus. Course c/b upper GI bleeding 3/13 w/ coffee ground emesis (resolved), sepsis 2/2 intra-abdominal abscesses. Now s/p IR drainage w/ 2 drains in the LUQ and 1 in the pelvis w/ 750 cc total output (3/20).    Neuro: Pain: LidoDerm, Tylenol PRN, Dilaudid PRN. Nausea: Zofran PRN. Depression/Appetite: Mirtazapine.   ENT: Cepacol for sore throat  CV: Tachycardic 2/2 sepsis. Repeat echo with EF (02/11) 55-60%. Mildly dilated RV. Maintain MAP >65. A.Fib/Flutter: s/p cardioversion on 12/30. Cont metoprolol 5 q6 and IV amiodarone; hx HTN: holding spironolactone, Entresto. Cont Lovenox for AC.  Pulm: Comfortable on RA  GI/FEN: s/p ostomy reversal (3/7). Upper GI bleed (resolved): on PPI BID. SBO/ileus: NGT to LIWS, NPO. PICC w TPN. VitC, MTV, Zinc, Octreotide in TPN.  : Voiding, Strict I and O.   Endo: mISS  Heme: LV thrombus w/ LLE ischemia: now on Lovenox 60 bid.  Active T&S.  ID: Cont Nystatin powder to buttocks. Sepsis 2/2 intra-abdominal abscesses, s/p IR drains x3 (3/20) growing MDR Klebsiella pneumoniae, staph haemolyticus, VRE faecium, parabacteroides goldsteinii. LLE ischemia s/p L AKA 2/15 // DC: tigecycline (3/16-3/23), Caspo (3/14-3/23), Daptomycin 2/11-3/9; 3/10-16, 3/22-3/23), Zosyn, Fluconazole (3/9-13), Erta (2/13-3/10; 3/13), Tobra (3/13-16), Luis (2/10-12, 3/13-16), Vancomycin (2/10-2/11, 3/20-22). ID recommending d/c all abx - futile/no options given resistance  PPX: SCDs, Lovenox 60 bid   L/D/T: PIV, LUQ drains x2, LLQ drain, RUQ x 1 (4 total)  PT/OT: Ordered  Dispo: SDU GOC: Pt is DNR DNI with poor prognosis given multi drug resistant organisms unable to be treated by antibiotics.

## 2023-03-27 NOTE — PROGRESS NOTE ADULT - CONVERSATION DETAILS
Multiple GOC discussions with pt, family, Dr. Lanier over the last week and throughout this prolonged, complicated hospitalization. All understand that pts prognosis is poor and further limited after abx were discontinued last week dt futility/multidrug resistance. Goc are to continue managing drains, tpn, aggressive care, brief trial of pressors (with the goal to allow family to be/arrive at bedside). When pt decompensates and death is imminent OR when patient verbalizes wish to stop aggressive care the goals will transition to symptom directed care with liberalized prn opioids/anxiolytics to ensure comfort through the dying process. pt is already DNR DNI. questions answered, support provided. will Lanier.

## 2023-03-27 NOTE — PROGRESS NOTE ADULT - PROBLEM SELECTOR PLAN 7
.  Complex medical decision making / symptom management in the setting of critical illness.    Coping:  well[  ] with difficulty[ x ] poor coping[  ] unable to assess[  ]   Support system: strong[  ] adequate[ x ] inadequate[  ]    Active Psychosocial Referrals:  SW/CM[  ] PT/OT[  ] Hospice[  ]  Ethics[  ]  Morelia Vidal Patient/Family Support[  ] Chaplaincy[  x ]  Massage Therapy[ x ] Music Therapy [  ] Holistic RN[  ]    - For new or uncontrolled symptoms, please call Palliative Care at [phone]. The service is available 24/7 (including nights & weekends) to provide symptom management recommendations over the phone as appropriate      - dw Dr. Lanier. JAVED readdressed, Palliative medicine will follow peripherally, will not see unless called. Please contact Palliative Medicine 24/7 at [phone] if the patient's symptoms and/or goals of care change, need to be readdressed, or if patient is readmitted in the future.

## 2023-03-27 NOTE — PROGRESS NOTE ADULT - PROBLEM SELECTOR PLAN 3
.  prolonged complicated hospital course, pps 30% requires assistance w all adls  - cw supportive care, skin care, reposition q2 PRN, encourage movement and OOB as negrito   - poor candidate for rehabilitation given prognosis

## 2023-03-27 NOTE — PROGRESS NOTE ADULT - PROBLEM SELECTOR PLAN 4
.  in setting of prolonged hospital course, multiple complications  - ongoing support provided  - chaplaincy and massage therapy following for support   - appreciate psych/BH support

## 2023-03-27 NOTE — PROGRESS NOTE ADULT - ASSESSMENT
77M PMHx of IVDU found unresponsive at his nursing home, resolved after Narcan in the field and brought to [facility]. Found to have PE, atrial flutter, and large LV thrombus on echo. Transferred to [facility] for further management. Pt c/o abdominal pain on 12/10 CTA showing mid SMA with embolus. Abnormal distal small bowel loops and cecum with dilatation and pneumatosis suggesting infarcted bowel. Now s/p Ex lap, SMA embolectomy, 80cm SBR, abthera vac left in discontinuity (12/11). S/p second look (12/13), s/p OR for 3rd look, end-to-end anastomosis of remaining bowel, loop ileostomy and abdomen closure (12/15). S/p LORENZO and Cardioversion on 12/30 with cardiology. Patient was nutritionally optimized. 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 s/p L AKA closure (3/1), s/p ileostomy reversal (3/7) c/b ileus. Course c/b upper GI bleeding 3/13 w/ coffee ground emesis (resolved), sepsis 2/2 intra-abdominal abscesses. Now s/p IR drainage w/ 2 drains in the LUQ and 1 in the pelvis w/ 750 cc total output (3/20).    Plan:    -Okay for SDU  -Continue hep gtt  -Continue amio gtt  -Rest of care per SICU  -Surgery Team 4C will continue to follow. Please page Team 4 with questions/clinical changes. [phone]

## 2023-03-27 NOTE — PROGRESS NOTE ADULT - ASSESSMENT
. 77M PMHx of IVDU found unresponsive at his nursing home, resolved after Narcan in the field and brought to [facility]. Found to have PE, atrial flutter, and large LV thrombus on echo. Transferred to [facility] for further management. Pt c/o abdominal pain on 12/10 CTA showing mid SMA with embolus. Abnormal distal small bowel loops and cecum with dilatation and pneumatosis suggesting infarcted bowel. Now s/p Ex lap, SMA embolectomy, 80cm SBR, abthera vac left in discontinuity (12/11). S/p second look (12/13), s/p OR for 3rd look, end-to-end anastomosis of remaining bowel, loop ileostomy and abdomen closure (12/15). s/p ileostomy reversal. Patient is now s/p upsizing of the two LUQ drains on 3/24 to 14 Fr (white) and 16 Fr (green) as well as placement of the RUQ drain on 3/24. Patient still with the LUQ drain from 3/20.    - monitor output q8h  - care per primary team    IR will continue to follow

## 2023-03-28 NOTE — PROGRESS NOTE ADULT - ASSESSMENT
78M PMHx of IVDU found unresponsive at his nursing home, resolved after Narcan in the field and brought to [facility]. Found to have PE, atrial flutter, and large LV thrombus on echo. Transferred to [facility] for further management. Pt c/o abdominal pain on 12/10 CTA showing mid SMA with embolus. Abnormal distal small bowel loops and cecum with dilatation and pneumatosis suggesting infarcted bowel. Now s/p Ex lap, SMA embolectomy, 80cm SBR, abthera vac left in discontinuity (12/11). S/p second look (12/13), s/p OR for 3rd look, end-to-end anastomosis of remaining bowel, loop ileostomy and abdomen closure (12/15). S/p LORENZO and Cardioversion on 12/30 with cardiology. 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 s/p L AKA closure (3/1), s/p ileostomy reversal (3/7) c/b ileus. Course c/b upper GI bleeding 3/13 w/ coffee ground emesis (resolved), sepsis 2/2 intra-abdominal abscesses. Now s/p IR drainage w/ 2 drains in the LUQ and 1 in the pelvis, 1 dashawn drain in RUQ     #bacteremia - k pneumoniae, VRE facium suspected from abdominal abscess  #post op state  3.7   #sepsis  Sepsis 2/2 intra-abdominal abscesses, s/p IR drains x3 (3/20) growing MDR Klebsiella pneumoniae, staph haemolyticus, VRE faecium, parabacteroides goldsteinii. LLE ischemia s/p L AKA 2/15 // DC: tigecycline (3/16-3/23), Caspo (3/14-3/23), Daptomycin 2/11-3/9; 3/10-16, 3/22-3/23), Zosyn, Fluconazole (3/9-13), Erta (2/13-3/10; 3/13), Tobra (3/13-16), Luis (2/10-12, 3/13-16), Vancomycin (2/10-2/11, 3/20-22). ID recommended to d/c all abx - futile/no options given resistance    # Bowel ischemia - s/p SMA embolectomy; Small bowel resection  #SBO/ileus   # intraabdominal abscess    -NGT to LIWS  - ileostomy reversed in OR for 3/7  - on mirtazepine   - 4 x DASHAWN drain   - NPO w TPN   - rest of the management per primary team     #s/p left AKA  - s/p OR with vascular for wound closure     #anemia of chronic disease  #UGIB -resolved now on PPI bid   - hg 7.9 after 1 prbc - ->8.4 --7.9-8.9--8.6-->8->6->4.5--7.3->6.9- 1 u prbc (3/26) - > 8.3->9  - PPI BID   - no e/o active bleeding  - transfusion goal > 7     #Suicidal Ideation  #Insomnia   - Continue mirtazapine 30mg QHS   - psychiatry following, and appreciate any input    # HFrEF, not in exacerbation   - TTE from 2/11 reviewed, normal LVEF   - Metoprolol  continued  - not volume overloaded.  on hold entresto and spironolactone.      # Atrial Flutter  - s/p DCCV 12/30  - EP recs - uninterrupted AC x 30 days ; January 29th was 30 days post DCCV.   - on full dose Lovenox- and amiodarone drip and metoprolol iv  q6h     #hyponatremia  -nl Na 134- 138   would just monitor for now     # Pulmonary embolism (subsegmental RUL)   # LV thrombus w/ LLE ischemia  - After completion of AC for DCCV, would need to continue AC for PE - on FD lovenox       # Severe protein-calorie malnutrition  # Sacral wound   - appreciate input from nutrition  - Wound care per nursing    #DVT ppx - lovenox fd  #Palliative team following

## 2023-03-28 NOTE — PROGRESS NOTE ADULT - SUBJECTIVE AND OBJECTIVE BOX
Pt seen and examined. All drains flushed with 3 cc of sterile saline. Dressing c/d.    - LUQ anterior (white) OLGA#1 with 10 cc of  serosanguinous output over 24 hrs. 13cc during the prior 24 hrs.  -LUQ lateral (green) OLGA #2 with 22cc of serous output over 24 hrs. 9 cc during the prior 24 hrs.   -Pelvic drain OLGA #3 with 20 cc of seropurulent output over 24 hrs. 32 cc during the prior 24 hrs.  -RUQ subhepatic drain OLGA #4 with 25cc of serosanguinous output over 24 hrs. Previously 34cc prior 24 hr period

## 2023-03-28 NOTE — PROGRESS NOTE ADULT - ASSESSMENT
77M PMHx of IVDU found unresponsive at his nursing home, resolved after Narcan in the field and brought to [facility]. Found to have PE, atrial flutter, and large LV thrombus on echo. Transferred to [facility] for further management. Pt c/o abdominal pain on 12/10 CTA showing mid SMA with embolus. Abnormal distal small bowel loops and cecum with dilatation and pneumatosis suggesting infarcted bowel. Now s/p Ex lap, SMA embolectomy, 80cm SBR, abthera vac left in discontinuity (12/11). S/p second look (12/13), s/p OR for 3rd look, end-to-end anastomosis of remaining bowel, loop ileostomy and abdomen closure (12/15). S/p LORENZO and Cardioversion on 12/30 with cardiology. Patient was nutritionally optimized. 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 s/p L AKA closure (3/1), s/p ileostomy reversal (3/7) c/b ileus. Course c/b upper GI bleeding 3/13 w/ coffee ground emesis (resolved), sepsis 2/2 intra-abdominal abscesses. Now s/p IR drainage w/ 2 drains in the LUQ and 1 in the pelvis w/ 750 cc total output (3/20).     77M PMHx of IVDU found unresponsive at his nursing home, resolved after Narcan in the field and brought to [facility]. Found to have PE, atrial flutter, and large LV thrombus on echo. Transferred to [facility] for further management. Pt c/o abdominal pain on 12/10 CTA showing mid SMA with embolus. Abnormal distal small bowel loops and cecum with dilatation and pneumatosis suggesting infarcted bowel. Now s/p Ex lap, SMA embolectomy, 80cm SBR, abthera vac left in discontinuity (12/11). S/p second look (12/13), s/p OR for 3rd look, end-to-end anastomosis of remaining bowel, loop ileostomy and abdomen closure (12/15). S/p LORENZO and Cardioversion on 12/30 with cardiology. Patient was nutritionally optimized. 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 s/p L AKA closure (3/1), s/p ileostomy reversal (3/7) c/b ileus. Course c/b upper GI bleeding 3/13 w/ coffee ground emesis (resolved), sepsis 2/2 intra-abdominal abscesses. Now s/p IR drainage w/ 2 drains in the LUQ and 1 in the pelvis w/ 750 cc total output (3/20).    A/p: 77M PMHx of IVDU found unresponsive at his nursing home, resolved after Narcan in the field and brought to [facility]. Found to have PE, atrial flutter, and large LV thrombus on echo. Transferred to [facility] for further management. Pt c/o abdominal pain on 12/10 CTA showing mid SMA with embolus. Abnormal distal small bowel loops and cecum with dilatation and pneumatosis suggesting infarcted bowel. Now s/p Ex lap, SMA embolectomy, 80cm SBR, abthera vac left in discontinuity (12/11). S/p second look (12/13), s/p OR for 3rd look, end-to-end anastomosis of remaining bowel, loop ileostomy and abdomen closure (12/15). S/p LORENZO and Cardioversion on 12/30 with cardiology. Patient was nutritionally optimized. 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 s/p L AKA closure (3/1), s/p ileostomy reversal (3/7) c/b ileus. Course c/b upper GI bleeding 3/13 w/ coffee ground emesis (resolved), sepsis 2/2 intra-abdominal abscesses. Now s/p IR drainage w/ 2 drains in the LUQ and 1 in the pelvis w/ 750 cc total output (3/20).    Pain/Nausea PRN  Mirtazapine - depression/Appetite  NGT in place  Metoprolol 5mg q6, amiodorone - holding spirinolactone/entresto  Lovenox BID  PPI BID  PICC/TPN - Octreotide  mISS  Nystatin to butocks  IR drains z3 - no abx  DNR/DNI

## 2023-03-28 NOTE — PROGRESS NOTE ADULT - SUBJECTIVE AND OBJECTIVE BOX
Patient is a 78y old  Male who presents with a chief complaint of A flutter (28 Mar 2023 06:58)      INTERVAL HPI/OVERNIGHT EVENTS: offers no new complaints; current symptoms resolving    MEDICATIONS  (STANDING):  aMIOdarone Infusion 0.5 mG/Min (16.7 mL/Hr) IV Continuous <Continuous>  chlorhexidine 2% Cloths 1 Application(s) Topical daily  dextrose 5%. 1000 milliLiter(s) (50 mL/Hr) IV Continuous <Continuous>  dextrose 5%. 1000 milliLiter(s) (100 mL/Hr) IV Continuous <Continuous>  dextrose 50% Injectable 25 Gram(s) IV Push once  dextrose 50% Injectable 12.5 Gram(s) IV Push once  dextrose 50% Injectable 25 Gram(s) IV Push once  enoxaparin Injectable 60 milliGRAM(s) SubCutaneous every 12 hours  fat emulsion (Fish Oil and Plant Based) 20% Infusion 0.7764 Gm/kG/Day (20.8 mL/Hr) IV Continuous <Continuous>  glucagon  Injectable 1 milliGRAM(s) IntraMuscular once  influenza  Vaccine (HIGH DOSE) 0.7 milliLiter(s) IntraMuscular once  insulin lispro (ADMELOG) corrective regimen sliding scale   SubCutaneous every 6 hours  lidocaine   4% Patch 1 Patch Transdermal daily  metoprolol tartrate Injectable 5 milliGRAM(s) IV Push every 6 hours  mirtazapine 30 milliGRAM(s) Oral at bedtime  nystatin Powder 1 Application(s) Topical two times a day  pantoprazole  Injectable 40 milliGRAM(s) IV Push every 12 hours  Parenteral Nutrition - Adult 1 Each (63 mL/Hr) TPN Continuous <Continuous>  Parenteral Nutrition - Adult 1 Each (63 mL/Hr) TPN Continuous <Continuous>  povidone iodine 10% Solution 1 Application(s) Topical daily  sodium chloride 0.9% lock flush 10 milliLiter(s) IV Push every 8 hours    MEDICATIONS  (PRN):  benzocaine/menthol Lozenge 1 Lozenge Oral every 4 hours PRN Sore Throat  dextrose Oral Gel 15 Gram(s) Oral once PRN Blood Glucose LESS THAN 70 milliGRAM(s)/deciliter  HYDROmorphone  Injectable 0.25 milliGRAM(s) IV Push every 4 hours PRN Severe Pain (7 - 10)  HYDROmorphone  Injectable 0.5 milliGRAM(s) IV Push every 6 hours PRN for breathrough pain  ondansetron Injectable 4 milliGRAM(s) IV Push every 6 hours PRN Nausea and/or Vomiting  sodium chloride 0.9% lock flush 10 milliLiter(s) IV Push every 1 hour PRN Pre/post blood products, medications, blood draw, and to maintain line patency      __________________________________________________  REVIEW OF SYSTEMS:    CONSTITUTIONAL: No fever,   EYES: no acute visual disturbances  NECK: No pain or stiffness  RESPIRATORY: No cough; No shortness of breath  CARDIOVASCULAR: No chest pain, no palpitations  GASTROINTESTINAL: No pain. No nausea or vomiting; No diarrhea   NEUROLOGICAL: No headache or numbness, no tremors  MUSCULOSKELETAL:+ BACK PAIN   GENITOURINARY: no dysuria, no frequency, no hesitancy  PSYCHIATRY: no depression , no anxiety  ALL OTHER  ROS negative        Vital Signs Last 24 Hrs  T(C): 36.4 (28 Mar 2023 09:19), Max: 37.3 (28 Mar 2023 05:00)  T(F): 97.5 (28 Mar 2023 09:19), Max: 99.2 (28 Mar 2023 05:00)  HR: 74 (28 Mar 2023 12:20) (74 - 86)  BP: 139/81 (28 Mar 2023 12:20) (129/79 - 161/85)  BP(mean): 103 (28 Mar 2023 12:20) (100 - 117)  RR: 17 (28 Mar 2023 12:20) (17 - 20)  SpO2: 99% (28 Mar 2023 12:20) (95% - 100%)    Parameters below as of 28 Mar 2023 12:20  Patient On (Oxygen Delivery Method): room air        ________________________________________________  PHYSICAL EXAM:  GENERAL: NAD  HEENT: Normocephalic;  conjunctivae and sclerae clear; dry mucous membranes; NGT in place  NECK : no JVD   CHEST/LUNG: Clear to auscultation bilaterally with good air entry   HEART: S1 S2  regular; no murmurs, gallops or rubs  ABDOMEN: Soft, Nontender, Nondistended; Bowel sounds present, 4 dashawn Drain w moderate outpt   EXTREMITIES: L Aka , RLE w R big toe ulceration in healing stages,   SKIN: warm and dry   NERVOUS SYSTEM:  Awake and alert; Oriented  to place, person and time ; no new deficits    _________________________________________________  LABS:                        9.0    26.67 )-----------( 428      ( 28 Mar 2023 05:30 )             28.4     03-28    134<L>  |  100  |  12  ----------------------------<  150<H>  4.0   |  27  |  0.50    Ca    8.5      28 Mar 2023 05:30  Phos  3.2     03-28  Mg     2.0     03-28      PTT - ( 28 Mar 2023 05:30 )  PTT:30.5 sec    CAPILLARY BLOOD GLUCOSE      POCT Blood Glucose.: 135 mg/dL (28 Mar 2023 11:33)  POCT Blood Glucose.: 133 mg/dL (28 Mar 2023 06:10)  POCT Blood Glucose.: 104 mg/dL (27 Mar 2023 17:55)        RADIOLOGY & ADDITIONAL TESTS:      Plan of care was discussed with patient and /or primary care giver; all questions and concerns were addressed and care was aligned with patient's wishes.

## 2023-03-28 NOTE — PROGRESS NOTE ADULT - ASSESSMENT
. 77M PMHx of IVDU found unresponsive at his nursing home, resolved after Narcan in the field and brought to [facility]. Found to have PE, atrial flutter, and large LV thrombus on echo. Transferred to [facility] for further management. Pt c/o abdominal pain on 12/10 CTA showing mid SMA with embolus. Abnormal distal small bowel loops and cecum with dilatation and pneumatosis suggesting infarcted bowel. Now s/p Ex lap, SMA embolectomy, 80cm SBR, abthera vac left in discontinuity (12/11). S/p second look (12/13), s/p OR for 3rd look, end-to-end anastomosis of remaining bowel, loop ileostomy and abdomen closure (12/15). s/p ileostomy reversal. Patient is now s/p upsizing of the two LUQ drains on 3/24 to 14 Fr (white) and 16 Fr (green) as well as placement of the RUQ drain on 3/24. Patient still with the LUQ drain from 3/20.    - monitor output q8h  - consider removal when output <10c/24hr for 2 consecutive days  - care per primary team    IR will continue to follow

## 2023-03-28 NOTE — PROGRESS NOTE ADULT - SUBJECTIVE AND OBJECTIVE BOX
STATUS POST:  12/11: Ex lap, SMA embolectomy, 80cm SBR, abthera vac  12/13: Second look, SBR  12/15: Ex-lap, no dead gut, DANIEL of discontinuous gut, loop ileostomy; EBL minimal, 1L crystalloid, UOP 150mL   12/30: LORENZO & Cardioversion on 12/30.   2/15: L AKA guillotine, EBL 10cc, IVF 400cc,   3/1: L AKA closure.   3/7: Ileostomy reversal. EBL 20, ,   3/20: IR drain x3  3/25: Upsize of LUQ nad placement subhepatic drain.      Interval events: Switched to lovenox BID from hep gtt    SUBJECTIVE: Patient seen and examined bedside by chief resident. Patient has no complaints this morning and quotes "Well I'm alive." He also expresses desire to remove the NGT. No return of bowel function at this time.    aMIOdarone Infusion 0.5 mG/Min IV Continuous <Continuous>  enoxaparin Injectable 60 milliGRAM(s) SubCutaneous every 12 hours  metoprolol tartrate Injectable 5 milliGRAM(s) IV Push every 6 hours      Vital Signs Last 24 Hrs  T(C): 37.3 (28 Mar 2023 05:00), Max: 37.3 (28 Mar 2023 05:00)  T(F): 99.2 (28 Mar 2023 05:00), Max: 99.2 (28 Mar 2023 05:00)  HR: 86 (28 Mar 2023 03:33) (75 - 86)  BP: 153/85 (28 Mar 2023 03:33) (131/77 - 161/85)  BP(mean): 113 (28 Mar 2023 03:33) (98 - 117)  RR: 18 (28 Mar 2023 03:33) (14 - 18)  SpO2: 97% (28 Mar 2023 03:33) (95% - 100%)    Parameters below as of 28 Mar 2023 03:33  Patient On (Oxygen Delivery Method): room air      I&O's Detail    26 Mar 2023 07:01  -  27 Mar 2023 07:00  --------------------------------------------------------  IN:    Amiodarone: 400.8 mL    Fat Emulsion (Fish Oil &amp; Plant Based) 20% Infusion: 231 mL    Fat Emulsion (Fish Oil &amp; Plant Based) 20% Infusion: 62.4 mL    Heparin: 344 mL    PRBCs (Packed Red Blood Cells): 350 mL    TPN (Total Parenteral Nutrition): 1386 mL  Total IN: 2774.2 mL    OUT:    Bulb (mL): 34 mL    Bulb (mL): 32 mL    Drain (mL): 9 mL    Drain (mL): 13 mL    Nasogastric/Oral tube (mL): 430 mL    Voided (mL): 1700 mL  Total OUT: 2218 mL    Total NET: 556.2 mL      27 Mar 2023 07:01  -  28 Mar 2023 06:58  --------------------------------------------------------  IN:    Amiodarone: 350.7 mL    Fat Emulsion (Fish Oil &amp; Plant Based) 20% Infusion: 187.2 mL    Fat Emulsion (Fish Oil &amp; Plant Based) 20% Infusion: 38 mL    Heparin: 40.5 mL    TPN (Total Parenteral Nutrition): 1323 mL  Total IN: 1939.4 mL    OUT:    Bulb (mL): 20 mL    Bulb (mL): 25 mL    Drain (mL): 22 mL    Drain (mL): 10 mL    Nasogastric/Oral tube (mL): 450 mL    Voided (mL): 2350 mL  Total OUT: 2877 mL    Total NET: -937.6 mL          General: NAD, resting comfortably in bed  C/V: NSR  Pulm: Nonlabored breathing, no respiratory distress  Abd: NGT in place, soft, NT/ND. JPx4, LLQ seropurulent, R OLGA SS, LUQ 1SS/2Serous  Extrem: L AKA        LABS:                        9.0    26.67 )-----------( 428      ( 28 Mar 2023 05:30 )             28.4     03-27    136  |  100  |  11  ----------------------------<  153<H>  3.9   |  29  |  0.50    Ca    8.0<L>      27 Mar 2023 05:30  Phos  3.2     03-27  Mg     2.0     03-27      PTT - ( 28 Mar 2023 05:30 )  PTT:30.5 sec      RADIOLOGY & ADDITIONAL STUDIES:   STATUS POST:  12/11: Ex lap, SMA embolectomy, 80cm SBR, abthera vac  12/13: Second look, SBR  12/15: Ex-lap, no dead gut, DANIEL of discontinuous gut, loop ileostomy; EBL minimal, 1L crystalloid, UOP 150mL   12/30: LORENZO & Cardioversion on 12/30.   2/15: L AKA guillotine, EBL 10cc, IVF 400cc,   3/1: L AKA closure.   3/7: Ileostomy reversal. EBL 20, ,   3/20: IR drain x3  3/25: Upsize of LUQ nad placement subhepatic drain.      Interval events: Switched to lovenox BID from hep gtt    SUBJECTIVE: Patient seen and examined bedside by chief resident. Patient has no complaints this morning and quotes "Well I'm alive." He also expresses desire to remove the NGT. No return of bowel function at this time.    aMIOdarone Infusion 0.5 mG/Min IV Continuous <Continuous>  enoxaparin Injectable 60 milliGRAM(s) SubCutaneous every 12 hours  metoprolol tartrate Injectable 5 milliGRAM(s) IV Push every 6 hours      Vital Signs Last 24 Hrs  T(C): 37.3 (28 Mar 2023 05:00), Max: 37.3 (28 Mar 2023 05:00)  T(F): 99.2 (28 Mar 2023 05:00), Max: 99.2 (28 Mar 2023 05:00)  HR: 86 (28 Mar 2023 03:33) (75 - 86)  BP: 153/85 (28 Mar 2023 03:33) (131/77 - 161/85)  BP(mean): 113 (28 Mar 2023 03:33) (98 - 117)  RR: 18 (28 Mar 2023 03:33) (14 - 18)  SpO2: 97% (28 Mar 2023 03:33) (95% - 100%)    Parameters below as of 28 Mar 2023 03:33  Patient On (Oxygen Delivery Method): room air      I&O's Detail    26 Mar 2023 07:01  -  27 Mar 2023 07:00  --------------------------------------------------------  IN:    Amiodarone: 400.8 mL    Fat Emulsion (Fish Oil &amp; Plant Based) 20% Infusion: 231 mL    Fat Emulsion (Fish Oil &amp; Plant Based) 20% Infusion: 62.4 mL    Heparin: 344 mL    PRBCs (Packed Red Blood Cells): 350 mL    TPN (Total Parenteral Nutrition): 1386 mL  Total IN: 2774.2 mL    OUT:    Bulb (mL): 34 mL    Bulb (mL): 32 mL    Drain (mL): 9 mL    Drain (mL): 13 mL    Nasogastric/Oral tube (mL): 430 mL    Voided (mL): 1700 mL  Total OUT: 2218 mL    Total NET: 556.2 mL      27 Mar 2023 07:01  -  28 Mar 2023 06:58  --------------------------------------------------------  IN:    Amiodarone: 350.7 mL    Fat Emulsion (Fish Oil &amp; Plant Based) 20% Infusion: 187.2 mL    Fat Emulsion (Fish Oil &amp; Plant Based) 20% Infusion: 38 mL    Heparin: 40.5 mL    TPN (Total Parenteral Nutrition): 1323 mL  Total IN: 1939.4 mL    OUT:    Bulb (mL): 20 mL    Bulb (mL): 25 mL    Drain (mL): 22 mL    Drain (mL): 10 mL    Nasogastric/Oral tube (mL): 450 mL    Voided (mL): 2350 mL  Total OUT: 2877 mL    Total NET: -937.6 mL          General: NAD, resting comfortably in bed  C/V: NSR  Pulm: Nonlabored breathing, no respiratory distress  Abd: NGT in place, soft, NT/ND. JPx4 all SS fluid  Extrem: L AKA        LABS:                        9.0    26.67 )-----------( 428      ( 28 Mar 2023 05:30 )             28.4     03-27    136  |  100  |  11  ----------------------------<  153<H>  3.9   |  29  |  0.50    Ca    8.0<L>      27 Mar 2023 05:30  Phos  3.2     03-27  Mg     2.0     03-27      PTT - ( 28 Mar 2023 05:30 )  PTT:30.5 sec      RADIOLOGY & ADDITIONAL STUDIES:

## 2023-03-29 NOTE — PROGRESS NOTE ADULT - ASSESSMENT
. 77M PMHx of IVDU found unresponsive at his nursing home, resolved after Narcan in the field and brought to [facility]. Found to have PE, atrial flutter, and large LV thrombus on echo. Transferred to [facility] for further management. Pt c/o abdominal pain on 12/10 CTA showing mid SMA with embolus. Abnormal distal small bowel loops and cecum with dilatation and pneumatosis suggesting infarcted bowel. Now s/p Ex lap, SMA embolectomy, 80cm SBR, abthera vac left in discontinuity (12/11). S/p second look (12/13), s/p OR for 3rd look, end-to-end anastomosis of remaining bowel, loop ileostomy and abdomen closure (12/15). s/p ileostomy reversal. Patient is now s/p upsizing of the two LUQ drains on 3/24 to 14 Fr (white) and 16 Fr (green) as well as placement of the RUQ drain on 3/24. Patient still with the LUQ drain from 3/20.    - monitor output q8h  - consider removal when output <10c/24hr for 2 consecutive days; consider repeat CTAP to reassess size of collections  - care per primary team    IR will continue to follow

## 2023-03-29 NOTE — PROGRESS NOTE ADULT - SUBJECTIVE AND OBJECTIVE BOX
Pt seen and examined. All drains flushed with 3 cc of sterile saline. Dressing c/d.    - LUQ anterior (white) OLGA#1 with 5 cc of  serosanguinous output over 24 hrs. 10 cc during the prior 24 hrs.  -LUQ lateral (green) OLGA #2 with 45cc of serous output over 24 hrs. 22cc during the prior 24 hrs.   -Pelvic drain OLGA #3 with 25 cc of seropurulent output over 24 hrs. 20 cc during the prior 24 hrs.  -RUQ subhepatic drain OLGA #4 with 40cc of serosanguinous output over 24 hrs. Previously 25cc prior 24 hr period

## 2023-03-29 NOTE — PROGRESS NOTE ADULT - ASSESSMENT
A/p: 77M PMHx of IVDU found unresponsive at his nursing home, resolved after Narcan in the field and brought to [facility]. Found to have PE, atrial flutter, and large LV thrombus on echo. Transferred to [facility] for further management. Pt c/o abdominal pain on 12/10 CTA showing mid SMA with embolus. Abnormal distal small bowel loops and cecum with dilatation and pneumatosis suggesting infarcted bowel. Now s/p Ex lap, SMA embolectomy, 80cm SBR, abthera vac left in discontinuity (12/11). S/p second look (12/13), s/p OR for 3rd look, end-to-end anastomosis of remaining bowel, loop ileostomy and abdomen closure (12/15). S/p LORENZO and Cardioversion on 12/30 with cardiology. Patient was nutritionally optimized. 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 s/p L AKA closure (3/1), s/p ileostomy reversal (3/7) c/b ileus. Course c/b upper GI bleeding 3/13 w/ coffee ground emesis (resolved), sepsis 2/2 intra-abdominal abscesses. Now s/p IR drainage w/ 2 drains in the LUQ and 1 in the pelvis w/ 750 cc total output (3/20).    Pain/Nausea PRN  Mirtazapine - depression/Appetite  NGT in place  Metoprolol 5mg q6, amiodorone - holding spirinolactone/entresto  Lovenox BID  PPI BID  PICC/TPN - Octreotide  mISS  Nystatin to buttocks  IR drains z3 - no abx  DNR/DNI

## 2023-03-29 NOTE — PROGRESS NOTE ADULT - SUBJECTIVE AND OBJECTIVE BOX
STATUS POST: 12/11: Ex lap, SMA embolectomy, 80cm SBR, abthera vac  12/13: Second look, SBR  12/15: Ex-lap, no dead gut, DANIEL of discontinuous gut, loop ileostomy; EBL minimal, 1L crystalloid, UOP 150mL   12/30: LORENZO & Cardioversion on 12/30.   2/15: L AKA guillotine, EBL 10cc, IVF 400cc,   3/1: L AKA closure.   3/7: Ileostomy reversal. EBL 20, ,   3/20: IR drain x3  3/25: Upsize of LUQ nad placement subhepatic drain.      SUBJECTIVE: Patient seen and examined bedside by chief resident. This AM patient unchanged, requesting removal of NGT but otherwise no acute complaints. -N/-V. No ROBF.    aMIOdarone Infusion 0.5 mG/Min IV Continuous <Continuous>  enoxaparin Injectable 60 milliGRAM(s) SubCutaneous every 12 hours  metoprolol tartrate Injectable 5 milliGRAM(s) IV Push every 6 hours      Vital Signs Last 24 Hrs  T(C): 36.2 (29 Mar 2023 10:16), Max: 37 (28 Mar 2023 21:53)  T(F): 97.2 (29 Mar 2023 10:16), Max: 98.6 (28 Mar 2023 21:53)  HR: 80 (29 Mar 2023 08:45) (72 - 82)  BP: 136/76 (29 Mar 2023 08:45) (114/72 - 139/81)  BP(mean): 98 (29 Mar 2023 08:45) (87 - 103)  RR: 18 (29 Mar 2023 08:45) (17 - 18)  SpO2: 95% (29 Mar 2023 08:45) (95% - 100%)    Parameters below as of 29 Mar 2023 08:45  Patient On (Oxygen Delivery Method): room air      I&O's Detail    28 Mar 2023 07:01  -  29 Mar 2023 07:00  --------------------------------------------------------  IN:    Amiodarone: 384.1 mL    Fat Emulsion (Fish Oil &amp; Plant Based) 20% Infusion: 20.8 mL    Fat Emulsion (Fish Oil &amp; Plant Based) 20% Infusion: 187.2 mL    TPN (Total Parenteral Nutrition): 1449 mL  Total IN: 2041.1 mL    OUT:    Bulb (mL): 40 mL    Bulb (mL): 20 mL    Drain (mL): 45 mL    Drain (mL): 5 mL    Nasogastric/Oral tube (mL): 200 mL    Voided (mL): 1300 mL  Total OUT: 1610 mL    Total NET: 431.1 mL          General: NAD, resting comfortably in bed  C/V: NSR  Pulm: Nonlabored breathing, no respiratory distress  Abd: NGT in place w/bilious output, soft, NT/ND, OLGA x4 SS fluid  Extrem: s/p L AKA R LE w/offloading boot        LABS:                        9.0    26.84 )-----------( 752      ( 29 Mar 2023 07:04 )             29.1     03-29    135  |  99  |  17  ----------------------------<  139<H>  4.1   |  27  |  0.52    Ca    8.4      29 Mar 2023 07:04  Phos  3.4     03-29  Mg     2.0     03-29      PTT - ( 28 Mar 2023 05:30 )  PTT:30.5 sec      RADIOLOGY & ADDITIONAL STUDIES:

## 2023-03-29 NOTE — PROGRESS NOTE ADULT - ASSESSMENT
78M PMHx of IVDU found unresponsive at his nursing home, resolved after Narcan in the field and brought to [facility]. Found to have PE, atrial flutter, and large LV thrombus on echo. Transferred to [facility] for further management. Pt c/o abdominal pain on 12/10 CTA showing mid SMA with embolus. Abnormal distal small bowel loops and cecum with dilatation and pneumatosis suggesting infarcted bowel. Now s/p Ex lap, SMA embolectomy, 80cm SBR, abthera vac left in discontinuity (12/11). S/p second look (12/13), s/p OR for 3rd look, end-to-end anastomosis of remaining bowel, loop ileostomy and abdomen closure (12/15). S/p LORENZO and Cardioversion on 12/30 with cardiology. 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 s/p L AKA closure (3/1), s/p ileostomy reversal (3/7) c/b ileus. Course c/b upper GI bleeding 3/13 w/ coffee ground emesis (resolved), sepsis 2/2 intra-abdominal abscesses. Now s/p IR drainage w/ 2 drains in the LUQ and 1 in the pelvis, 1 dashawn drain in RUQ     #bacteremia - k pneumoniae, VRE facium suspected from abdominal abscess  #post op state  3.7   #sepsis  Sepsis 2/2 intra-abdominal abscesses, s/p IR drains x3 (3/20) growing MDR Klebsiella pneumoniae, staph haemolyticus, VRE faecium, parabacteroides goldsteinii. LLE ischemia s/p L AKA 2/15 // DC: tigecycline (3/16-3/23), Caspo (3/14-3/23), Daptomycin 2/11-3/9; 3/10-16, 3/22-3/23), Zosyn, Fluconazole (3/9-13), Erta (2/13-3/10; 3/13), Tobra (3/13-16), Luis (2/10-12, 3/13-16), Vancomycin (2/10-2/11, 3/20-22). ID recommended to d/c all abx - futile/no options given resistance    # Bowel ischemia - s/p SMA embolectomy; Small bowel resection  #SBO/ileus   # intraabdominal abscess    -NGT to LIWS  - ileostomy reversed in OR for 3/7  - on mirtazepine   - 4 x DASHAWN drain   - NPO w TPN   - rest of the management per primary team     #s/p left AKA  - s/p OR with vascular for wound closure     #anemia of chronic disease  #UGIB -resolved now on PPI bid   - hg 7.9 after 1 prbc - ->8.4 --7.9-8.9--8.6-->8->6->4.5--7.3->6.9- 1 u prbc (3/26) - > 8.3->9  - PPI BID   - no e/o active bleeding  - transfusion goal > 7     #Suicidal Ideation  #Insomnia   - Continue mirtazapine 30mg QHS   - psychiatry following, and appreciate any input    # HFrEF, not in exacerbation   - TTE from 2/11 reviewed, normal LVEF   - Metoprolol  continued  - not volume overloaded.  on hold entresto and spironolactone.      # Atrial Flutter  - s/p DCCV 12/30  - EP recs - uninterrupted AC x 30 days ; January 29th was 30 days post DCCV.   - on full dose Lovenox- and amiodarone drip and metoprolol iv  q6h     #hyponatremia  -nl Na 134- 138   would just monitor for now     # Pulmonary embolism (subsegmental RUL)   # LV thrombus w/ LLE ischemia  - After completion of AC for DCCV, would need to continue AC for PE - on FD lovenox       # Severe protein-calorie malnutrition  # Sacral wound   - appreciate input from nutrition  - Wound care per nursing    #DVT ppx - lovenox fd  #Palliative team following

## 2023-03-29 NOTE — CHART NOTE - NSCHARTNOTEFT_GEN_A_CORE
The writer met with the patient for f/u individual psychotherapy. Support was provided in the context of the patient's hospitalization. No SHIIPAVH reported.

## 2023-03-29 NOTE — PROGRESS NOTE ADULT - SUBJECTIVE AND OBJECTIVE BOX
Patient is a 78y old  Male who presents with a chief complaint of A flutter (28 Mar 2023 16:29)      INTERVAL HPI/OVERNIGHT EVENTS: offers no new complaints     MEDICATIONS  (STANDING):  aMIOdarone Infusion 0.5 mG/Min (16.7 mL/Hr) IV Continuous <Continuous>  chlorhexidine 2% Cloths 1 Application(s) Topical daily  dextrose 5%. 1000 milliLiter(s) (50 mL/Hr) IV Continuous <Continuous>  dextrose 5%. 1000 milliLiter(s) (100 mL/Hr) IV Continuous <Continuous>  dextrose 50% Injectable 25 Gram(s) IV Push once  dextrose 50% Injectable 12.5 Gram(s) IV Push once  dextrose 50% Injectable 25 Gram(s) IV Push once  enoxaparin Injectable 60 milliGRAM(s) SubCutaneous every 12 hours  fat emulsion (Fish Oil and Plant Based) 20% Infusion 0.7764 Gm/kG/Day (20.8 mL/Hr) IV Continuous <Continuous>  glucagon  Injectable 1 milliGRAM(s) IntraMuscular once  influenza  Vaccine (HIGH DOSE) 0.7 milliLiter(s) IntraMuscular once  insulin lispro (ADMELOG) corrective regimen sliding scale   SubCutaneous every 6 hours  lidocaine   4% Patch 1 Patch Transdermal daily  metoprolol tartrate Injectable 5 milliGRAM(s) IV Push every 6 hours  mirtazapine 30 milliGRAM(s) Oral at bedtime  nystatin Powder 1 Application(s) Topical two times a day  pantoprazole  Injectable 40 milliGRAM(s) IV Push every 12 hours  Parenteral Nutrition - Adult 1 Each (63 mL/Hr) TPN Continuous <Continuous>  Parenteral Nutrition - Adult 1 Each (63 mL/Hr) TPN Continuous <Continuous>  povidone iodine 10% Solution 1 Application(s) Topical daily  sodium chloride 0.9% lock flush 10 milliLiter(s) IV Push every 8 hours    MEDICATIONS  (PRN):  benzocaine/menthol Lozenge 1 Lozenge Oral every 4 hours PRN Sore Throat  dextrose Oral Gel 15 Gram(s) Oral once PRN Blood Glucose LESS THAN 70 milliGRAM(s)/deciliter  HYDROmorphone  Injectable 0.25 milliGRAM(s) IV Push every 4 hours PRN Severe Pain (7 - 10)  HYDROmorphone  Injectable 0.5 milliGRAM(s) IV Push every 6 hours PRN for breathrough pain  ondansetron Injectable 4 milliGRAM(s) IV Push every 6 hours PRN Nausea and/or Vomiting  sodium chloride 0.9% lock flush 10 milliLiter(s) IV Push every 1 hour PRN Pre/post blood products, medications, blood draw, and to maintain line patency      __________________________________________________  REVIEW OF SYSTEMS:    CONSTITUTIONAL: No fever,   EYES: no acute visual disturbances  NECK: No pain or stiffness  RESPIRATORY: No cough; No shortness of breath  CARDIOVASCULAR: No chest pain, no palpitations  GASTROINTESTINAL: No pain. No nausea or vomiting; No diarrhea   NEUROLOGICAL: No headache or numbness, no tremors  MUSCULOSKELETAL:+ back pain j   GENITOURINARY: no dysuria, no frequency, no hesitancy  PSYCHIATRY: no depression , no anxiety  ALL OTHER  ROS negative        Vital Signs Last 24 Hrs  T(C): 36.2 (29 Mar 2023 10:16), Max: 37 (28 Mar 2023 21:53)  T(F): 97.2 (29 Mar 2023 10:16), Max: 98.6 (28 Mar 2023 21:53)  HR: 80 (29 Mar 2023 08:45) (72 - 82)  BP: 136/76 (29 Mar 2023 08:45) (114/72 - 139/81)  BP(mean): 98 (29 Mar 2023 08:45) (87 - 103)  RR: 18 (29 Mar 2023 08:45) (17 - 18)  SpO2: 95% (29 Mar 2023 08:45) (95% - 100%)    Parameters below as of 29 Mar 2023 08:45  Patient On (Oxygen Delivery Method): room air        ________________________________________________  PHYSICAL EXAM:  GENERAL: NAD  HEENT: Normocephalic;  conjunctivae and sclerae clear; dry mucous membranes; NGT in place  NECK : no JVD   CHEST/LUNG: Clear to auscultation bilaterally with good air entry   HEART: S1 S2  regular; no murmurs, gallops or rubs  ABDOMEN: Soft, Nontender, Nondistended; Bowel sounds present, 4 dashawn Drain w moderate outpt   EXTREMITIES: L Aka , RLE w peeling ulceration in multiple stages of healing   SKIN: warm and dry   NERVOUS SYSTEM:  Awake and alert; Oriented  to place, person and president; no new deficits  _________________________________________________  LABS:                        9.0    26.84 )-----------( 752      ( 29 Mar 2023 07:04 )             29.1     03-29    135  |  99  |  17  ----------------------------<  139<H>  4.1   |  27  |  0.52    Ca    8.4      29 Mar 2023 07:04  Phos  3.4     03-29  Mg     2.0     03-29      PTT - ( 28 Mar 2023 05:30 )  PTT:30.5 sec    CAPILLARY BLOOD GLUCOSE      POCT Blood Glucose.: 150 mg/dL (29 Mar 2023 11:32)  POCT Blood Glucose.: 145 mg/dL (29 Mar 2023 05:56)  POCT Blood Glucose.: 130 mg/dL (29 Mar 2023 00:06)  POCT Blood Glucose.: 129 mg/dL (28 Mar 2023 17:31)        RADIOLOGY & ADDITIONAL TESTS:      Plan of care was discussed with patient and /or primary care giver; all questions and concerns were addressed and care was aligned with patient's wishes.

## 2023-03-29 NOTE — CHART NOTE - NSCHARTNOTEFT_GEN_A_CORE
Admitting Diagnosis:   Patient is a 78y old  Male who presents with a chief complaint of A flutter (29 Mar 2023 12:01)    PAST MEDICAL & SURGICAL HISTORY:  IVDU     Current Nutrition Order:  NPO diet with NGT to LIWS  TPN: 275g Dex, 90g AA, 50g 20% SMOF Lipids to provide in total 1795Kcal, 90g protein, GIR 2.98 (based on ABW:65kg), 1.4g/kg ABW protein    PO Intake: Good (%) [   ]  Fair (50-75%) [   ] Poor (<25%) [   ]-- NA NPO/TPN     GI Issues:   Ostomy now reversed 3/7   RN Denies BM today, per flow sheets   NGT to LIWS: 200ml/24hrs   O/N: CT showed improved pelvic and LUQ collection, new RUQ collection + air, trace pneumoperitoneum poss leak vs perf (no extrav)  Ordered for pantoprazole remeron and zofran     Pain:   None per flow sheets     Skin Integrity:  Surgical incision, Buddy: 11  New left AKA as of 2/15  No edema noted at this time  skin tears/wounds noted, please see flow sheets for location   pressure ulcer: thoracic spine/sacral spine stage III    Labs:       135  |  99  |  17  ----------------------------<  139<H>  4.1   |  27  |  0.52    Ca    8.4      29 Mar 2023 07:04  Phos  3.4     -  Mg     2.0         CAPILLARY BLOOD GLUCOSE    POCT Blood Glucose.: 150 mg/dL (29 Mar 2023 11:32)  POCT Blood Glucose.: 145 mg/dL (29 Mar 2023 05:56)  POCT Blood Glucose.: 130 mg/dL (29 Mar 2023 00:06)  POCT Blood Glucose.: 129 mg/dL (28 Mar 2023 17:31)    Medications:  MEDICATIONS  (STANDING):  aMIOdarone Infusion 0.5 mG/Min (16.7 mL/Hr) IV Continuous <Continuous>  chlorhexidine 2% Cloths 1 Application(s) Topical daily  dextrose 5%. 1000 milliLiter(s) (50 mL/Hr) IV Continuous <Continuous>  dextrose 5%. 1000 milliLiter(s) (100 mL/Hr) IV Continuous <Continuous>  dextrose 50% Injectable 25 Gram(s) IV Push once  dextrose 50% Injectable 12.5 Gram(s) IV Push once  dextrose 50% Injectable 25 Gram(s) IV Push once  diatrizoate meglumine/diatrizoate sodium. 30 milliLiter(s) Enteral Tube once  enoxaparin Injectable 60 milliGRAM(s) SubCutaneous every 12 hours  fat emulsion (Fish Oil and Plant Based) 20% Infusion 0.7764 Gm/kG/Day (20.8 mL/Hr) IV Continuous <Continuous>  glucagon  Injectable 1 milliGRAM(s) IntraMuscular once  influenza  Vaccine (HIGH DOSE) 0.7 milliLiter(s) IntraMuscular once  insulin lispro (ADMELOG) corrective regimen sliding scale   SubCutaneous every 6 hours  lidocaine   4% Patch 1 Patch Transdermal daily  metoprolol tartrate Injectable 5 milliGRAM(s) IV Push every 6 hours  mirtazapine 30 milliGRAM(s) Oral at bedtime  nystatin Powder 1 Application(s) Topical two times a day  pantoprazole  Injectable 40 milliGRAM(s) IV Push every 12 hours  Parenteral Nutrition - Adult 1 Each (63 mL/Hr) TPN Continuous <Continuous>  Parenteral Nutrition - Adult 1 Each (63 mL/Hr) TPN Continuous <Continuous>  povidone iodine 10% Solution 1 Application(s) Topical daily  sodium chloride 0.9% lock flush 10 milliLiter(s) IV Push every 8 hours    MEDICATIONS  (PRN):  benzocaine/menthol Lozenge 1 Lozenge Oral every 4 hours PRN Sore Throat  dextrose Oral Gel 15 Gram(s) Oral once PRN Blood Glucose LESS THAN 70 milliGRAM(s)/deciliter  HYDROmorphone  Injectable 0.25 milliGRAM(s) IV Push every 4 hours PRN Severe Pain (7 - 10)  HYDROmorphone  Injectable 0.5 milliGRAM(s) IV Push every 6 hours PRN for breathrough pain  ondansetron Injectable 4 milliGRAM(s) IV Push every 6 hours PRN Nausea and/or Vomiting  sodium chloride 0.9% lock flush 10 milliLiter(s) IV Push every 1 hour PRN Pre/post blood products, medications, blood draw, and to maintain line patency    Admitting Anthropometrics:  6'6''  pounds +-10%  Wt 142 pounds BMI 16.4 %IBW=66%   New adjusted IBW (w/ L AKA): 178lbs/ 81.3kg     Weight Change:    150.3 pounds (+Edema at this time)   2/15 141.7 pounds   141.9 pounds - dosing wt    136 pounds - Bedscale wt   2/15 142lbs    **PCM:  >> Reports having 1-2 meals/day + ONS. Per pt claims to have 2 ensure/day and 2 nutrament/day - unclear how accurate this is. ?If pt meeting ~75% EER consistently.  >> Does report wt loss.  pounds x6 months ago. Thinks he now is 135 pounds which is consistent with bedscale wt obtained during initial visit by  pounds. Suggest wt loss of 49 pounds/26% body wt loss.  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     Estimated energy needs:  ABW (65kg): for calculations as pt between % of IBW (80%)  Adjust for age, malnutrition, EF, S/p OR, Pressure ulcer; fluids per team  30-35kcal/k-2275kcal/day   1.4-1.6gm/k-104gm prot/day   **Rec upper end of needs     Subjective:   77M with PMHx of IVDU found unresponsive at his nursing home, resolved after Narcan in the field and brought to [facility]. Found to have PE, atrial flutter, and large LV thrombus on echo. Transferred to [facility] for further management. Pt c/o abdominal pain on 12/10 CTA showing mid SMA with embolus. Abnormal distal small bowel loops and cecum with dilatation and pneumatosis suggesting infarcted bowel. One or two tiny foci of intrahepatic portal vein pneumatosis. Segmental infarction upper pole left kidney. Now s/p Ex lap, SMA embolectomy, 80cm SBR, abthera vac left in discontinuity () and transferred to SICU intubated. S/p second look () and most recently s/p OR for 3rd look, end-to-end anastomosis of remaining bowel, loop ileostomy and abdomen closure (12/15). Transferred to CCU on  for cardiac optimization and now transferred back to SICU  for bleeding hemodynamic instability. Echo  shows EF 10-15% with overall hypokinesis. CTA performed demonstrating large hematoma in R abdomen, new hemoperitoneum, and bilateral pleural effusions. Remains in SICU, now extubated with still with limb ischemia to LLE pending amputation and AC held given BRBPR and hematoma; noted pt agreeable for AKA when feasible - AKA currently Pending Cards. Pt s/p DCCV on  (negative LORENZO on ) with successful conversion to NSR. Planning for AKA with vascular surgery once nutrition status is optimize. Team placed PICC 1/10 and initiated supplemental TPN now weaned off with constant encouragement of PO intake. CT A/P  showing RLQ fluid collection. PPN held / bacteremia. More recently found to have k. 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 PICC placed  now plan to restart TPN. S/p RTOR for L AKA closure 3/1. S/p ileostomy reversal (3/7). Pt with dark output from NGT 3/13 and transferred back to SICU. NG replaced 3/19. NGT output now clear from coffee ground 3/16. IR reconsulted for right lower abdominal wall collection per CT Scan 3/19. Now s/p IR drainage with 2 drains in the LUQ and 1 in the pelvis: 750 cc total output 3/20. Pending GOC/palliative/transition to hospice care discussion with family. S/p follow up GOC 3/27 confirmed DNI/DNR. On assessment, pt resting in bed. Currently remains NPO with TPN at goal meeting 100% of est needs. N n/v/d/c. NGT in place to LIWS (200 ml/24hr). Edu deferred at this time. RD to follow.     Prior PES: Malnutrition Severe in Chronic state RT presumed intake<EER AEB NFPE, wt loss, PO intake  >>on going  Goal: Pt will meet at least 75% of nutrient needs via feasible route / Show no further s/s of malnutrition    Recommendations:  1. While TPN Remains indicated as medically feasible/appropriate:  >> TPN: 308g Dex, 100g AA, 50g SMOF lipids to provide 1947 kcal, 100g pro, GIR 3.2 (Based on ABW:65kg).   >> Cont to Check TG weekly. Check Mg, Phos, K daily and POC BG Q6hrs. Trend daily weights. Fluids and lytes per MD discretion.   2. Monitor for ability to adv Diet Pending GI progression.   3. Monitor Skin, Wt, Labs, GOC.  4. Pain/GI per team.  5. Monitor for GOC- Honor at all times.   6. RD to remain available for additional nutrition interventions as needed.     Education:  Deferred at this time    Risk Level: High [ X ] Moderate [   ] Low [   ].

## 2023-03-30 NOTE — PROGRESS NOTE ADULT - ASSESSMENT
77M PMHx of IVDU found unresponsive at his nursing home, resolved after Narcan in the field and brought to [facility]. Found to have PE, atrial flutter, and large LV thrombus on echo. Transferred to [facility] for further management. Pt c/o abdominal pain on 12/10 CTA showing mid SMA with embolus. Abnormal distal small bowel loops and cecum with dilatation and pneumatosis suggesting infarcted bowel. Now s/p Ex lap, SMA embolectomy, 80cm SBR, abthera vac left in discontinuity (12/11). S/p second look (12/13), s/p OR for 3rd look, end-to-end anastomosis of remaining bowel, loop ileostomy and abdomen closure (12/15). S/p LORENZO and Cardioversion on 12/30 with cardiology. Patient was nutritionally optimized. 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 s/p L AKA closure (3/1), s/p ileostomy reversal (3/7) c/b ileus. Course c/b upper GI bleeding 3/13 w/ coffee ground emesis (resolved), sepsis 2/2 intra-abdominal abscesses. Now s/p IR drainage w/ 2 drains in the LUQ and 1 in the pelvis w/ 750 cc total output (3/20).    A/p: 77M PMHx of IVDU found unresponsive at his nursing home, resolved after Narcan in the field and brought to [facility]. Found to have PE, atrial flutter, and large LV thrombus on echo. Transferred to [facility] for further management. Pt c/o abdominal pain on 12/10 CTA showing mid SMA with embolus. Abnormal distal small bowel loops and cecum with dilatation and pneumatosis suggesting infarcted bowel. Now s/p Ex lap, SMA embolectomy, 80cm SBR, abthera vac left in discontinuity (12/11). S/p second look (12/13), s/p OR for 3rd look, end-to-end anastomosis of remaining bowel, loop ileostomy and abdomen closure (12/15). S/p LORENZO and Cardioversion on 12/30 with cardiology. Patient was nutritionally optimized. 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 s/p L AKA closure (3/1), s/p ileostomy reversal (3/7) c/b ileus. Course c/b upper GI bleeding 3/13 w/ coffee ground emesis (resolved), sepsis 2/2 intra-abdominal abscesses. Now s/p IR drainage w/ 2 drains in the LUQ and 1 in the pelvis w/ 750 cc total output (3/20).    Pain/Nausea PRN  Mirtazapine - depression/Appetite  NGT in place: Monitor NGT output - possible DC if low ouptut  Metoprolol 5mg q6, amiodorone - holding spirinolactone/entresto  Lovenox BID  PPI BID  PICC/TPN - Octreotide  mISS  Nystatin to butocks  IR drains z3 - no abx  DNR/DNI

## 2023-03-30 NOTE — PROGRESS NOTE ADULT - SUBJECTIVE AND OBJECTIVE BOX
INTERVAL HPI/OVERNIGHT EVENTS: offers no new complaints no overnight events.         PHYSICAL EXAM:  GENERAL: NAD  HEENT: Normocephalic;  conjunctivae and sclerae clear; dry mucous membranes; NGT in place  NECK : no JVD   CHEST/LUNG: Clear to auscultation bilaterally with good air entry   HEART: S1 S2  regular; no murmurs, gallops or rubs  ABDOMEN: Soft, Nontender, Nondistended; Bowel sounds present, 4 dashawn Drain w moderate outpt   EXTREMITIES: L Aka , RLE w peeling ulceration in multiple stages of healing   SKIN: warm and dry   NERVOUS SYSTEM:  Awake and alert; Oriented  to place, person and president; no new deficits          VS/labs reviewed. No fevers. Leukocytosis mild improvement.

## 2023-03-30 NOTE — PROGRESS NOTE ADULT - SUBJECTIVE AND OBJECTIVE BOX
STATUS POST:  12/11: Ex lap, SMA embolectomy, 80cm SBR, abthera vac  12/13: Second look, SBR  12/15: Ex-lap, no dead gut, DANIEL of discontinuous gut, loop ileostomy; EBL minimal, 1L crystalloid, UOP 150mL   12/30: LORENZO & Cardioversion on 12/30.   2/15: L AKA guillotine, EBL 10cc, IVF 400cc,   3/1: L AKA closure.   3/7: Ileostomy reversal. EBL 20, ,   3/20: IR drain x3  3/25: Upsize of LUQ nad placement subhepatic drain.      SUBJECTIVE: Patient seen and examined bedside by chief resident. Patient has no new acute complaints although continues to ask about removal of NGT. Denies any N/V/F/C. No ROBF at this time.     aMIOdarone Infusion 0.5 mG/Min IV Continuous <Continuous>  enoxaparin Injectable 60 milliGRAM(s) SubCutaneous every 12 hours  metoprolol tartrate Injectable 5 milliGRAM(s) IV Push every 6 hours      Vital Signs Last 24 Hrs  T(C): 36.4 (29 Mar 2023 21:50), Max: 36.6 (29 Mar 2023 13:35)  T(F): 97.6 (29 Mar 2023 21:50), Max: 97.9 (29 Mar 2023 17:54)  HR: 86 (30 Mar 2023 04:30) (70 - 86)  BP: 100/69 (30 Mar 2023 04:30) (100/69 - 151/74)  BP(mean): 81 (30 Mar 2023 04:30) (81 - 100)  RR: 17 (30 Mar 2023 04:30) (16 - 18)  SpO2: 100% (30 Mar 2023 04:30) (95% - 100%)    Parameters below as of 30 Mar 2023 04:30  Patient On (Oxygen Delivery Method): room air      I&O's Detail    29 Mar 2023 07:01  -  30 Mar 2023 07:00  --------------------------------------------------------  IN:    Amiodarone: 384.1 mL    Enteral Tube Flush: 120 mL    Fat Emulsion (Fish Oil &amp; Plant Based) 20% Infusion: 62.4 mL    Fat Emulsion (Fish Oil &amp; Plant Based) 20% Infusion: 270.4 mL    TPN (Total Parenteral Nutrition): 1449 mL  Total IN: 2285.9 mL    OUT:    Bulb (mL): 15 mL    Bulb (mL): 40 mL    Drain (mL): 5 mL    Drain (mL): 10 mL    Emesis (mL): 0 mL    Nasogastric/Oral tube (mL): 700 mL    Oral Fluid: 0 mL    Voided (mL): 1400 mL  Total OUT: 2170 mL    Total NET: 115.9 mL          General: NAD, resting comfortably in bed  C/V: NSR  Pulm: Nonlabored breathing, no respiratory distress  Abd: NGT in place, soft, NT/ND. JPx4 all SS fluid  Extrem: L AKA      LABS:                        8.5    23.87 )-----------( 689      ( 30 Mar 2023 06:53 )             27.3     03-30    134<L>  |  99  |  14  ----------------------------<  137<H>  4.2   |  25  |  0.52    Ca    8.0<L>      30 Mar 2023 06:53  Phos  3.4     03-30  Mg     2.0     03-30            RADIOLOGY & ADDITIONAL STUDIES:

## 2023-03-30 NOTE — PROGRESS NOTE ADULT - ASSESSMENT
78M PMHx of IVDU found unresponsive at his nursing home, resolved after Narcan in the field and brought to [facility]. Found to have PE, atrial flutter, and large LV thrombus on echo. Transferred to [facility] for further management. Pt c/o abdominal pain on 12/10 CTA showing mid SMA with embolus. Abnormal distal small bowel loops and cecum with dilatation and pneumatosis suggesting infarcted bowel. Now s/p Ex lap, SMA embolectomy, 80cm SBR, abthera vac left in discontinuity (12/11). S/p second look (12/13), s/p OR for 3rd look, end-to-end anastomosis of remaining bowel, loop ileostomy and abdomen closure (12/15). S/p LORENZO and Cardioversion on 12/30 with cardiology. 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 s/p L AKA closure (3/1), s/p ileostomy reversal (3/7) c/b ileus. Course c/b upper GI bleeding 3/13 w/ coffee ground emesis (resolved), sepsis 2/2 intra-abdominal abscesses. Now s/p IR drainage w/ 2 drains in the LUQ and 1 in the pelvis, 1 dashawn drain in RUQ     #bacteremia - k pneumoniae, VRE facium suspected from abdominal abscess  #post op state  3.7   #sepsis  Sepsis 2/2 intra-abdominal abscesses, s/p IR drains x3 (3/20) growing MDR Klebsiella pneumoniae, staph haemolyticus, VRE faecium, parabacteroides goldsteinii. LLE ischemia s/p L AKA 2/15 // DC: tigecycline (3/16-3/23), Caspo (3/14-3/23), Daptomycin 2/11-3/9; 3/10-16, 3/22-3/23), Zosyn, Fluconazole (3/9-13), Erta (2/13-3/10; 3/13), Tobra (3/13-16), Luis (2/10-12, 3/13-16), Vancomycin (2/10-2/11, 3/20-22). ID recommended to d/c all abx - futile/no options given resistance    # Bowel ischemia - s/p SMA embolectomy; Small bowel resection  #SBO/ileus   # intraabdominal abscess    -NGT to LIWS  - ileostomy reversed in OR for 3/7  - on mirtazepine   - 4 x DASHAWN drain   - NPO w TPN   - rest of the management per primary team     #s/p left AKA  - s/p OR with vascular for wound closure     #anemia of chronic disease  #UGIB -resolved now on PPI bid   - hg 7.9 after 1 prbc - ->8.4 --7.9-8.9--8.6-->8->6->4.5--7.3->6.9- 1 u prbc (3/26) - > 8.3->9  - PPI BID   - no e/o active bleeding  - transfusion goal > 7     #Suicidal Ideation  #Insomnia   - Continue mirtazapine 30mg QHS   - psychiatry following, and appreciate any input    # HFrEF, not in exacerbation   - TTE from 2/11 reviewed, normal LVEF   - Metoprolol  continued  - not volume overloaded.  on hold entresto and spironolactone.      # Atrial Flutter  - s/p DCCV 12/30  - EP recs - uninterrupted AC x 30 days ; January 29th was 30 days post DCCV.   - on full dose Lovenox- and amiodarone drip and metoprolol iv  q6h     #hyponatremia  -nl Na 134- 138   would just monitor for now     # Pulmonary embolism (subsegmental RUL)   # LV thrombus w/ LLE ischemia  - After completion of AC for DCCV, would need to continue AC for PE - on FD lovenox       # Severe protein-calorie malnutrition  # Sacral wound   - appreciate input from nutrition  - Wound care per nursing    #DVT ppx - lovenox fd  #Palliative team following

## 2023-03-30 NOTE — PROGRESS NOTE ADULT - SUBJECTIVE AND OBJECTIVE BOX
Pt seen and examined. All drains flushed with 3 cc of sterile saline. Dressing c/d.    - LUQ anterior (white) OLGA#1 with 5 cc of  serosanguinous output over 24 hrs. 5 cc during the prior 24 hrs.  -LUQ lateral (green) OLGA #2 with 10cc of serous output over 24 hrs. 45cc during the prior 24 hrs.   -Pelvic drain OLGA #3 with 15 cc of seropurulent output over 24 hrs. 25 cc during the prior 24 hrs.  -RUQ subhepatic drain OLGA #4 with 40cc of serosanguinous output over 24 hrs. Previously 40cc prior 24 hr period           Pt seen and examined. All drains flushed with 5 cc of sterile saline. Dressing c/d.    - LUQ anterior (white) OLGA#1 with 5 cc of  serosanguinous output over 24 hrs. 5 cc during the prior 24 hrs.  -LUQ lateral (green) OLGA #2 with 10cc of serous output over 24 hrs. 45cc during the prior 24 hrs.   -Pelvic drain OLGA #3 with 15 cc of seropurulent output over 24 hrs. 25 cc during the prior 24 hrs.  -RUQ subhepatic drain OLGA #4 with 40cc of serosanguinous output over 24 hrs. Previously 40cc prior 24 hr period

## 2023-03-30 NOTE — PROGRESS NOTE ADULT - ASSESSMENT
. 77M PMHx of IVDU found unresponsive at his nursing home, resolved after Narcan in the field and brought to [facility]. Found to have PE, atrial flutter, and large LV thrombus on echo. Transferred to [facility] for further management. Pt c/o abdominal pain on 12/10 CTA showing mid SMA with embolus. Abnormal distal small bowel loops and cecum with dilatation and pneumatosis suggesting infarcted bowel. Now s/p Ex lap, SMA embolectomy, 80cm SBR, abthera vac left in discontinuity (12/11). S/p second look (12/13), s/p OR for 3rd look, end-to-end anastomosis of remaining bowel, loop ileostomy and abdomen closure (12/15). s/p ileostomy reversal. Patient is now s/p upsizing of the two LUQ drains on 3/24 to 14 Fr (white) and 16 Fr (green) as well as placement of the RUQ drain on 3/24. Patient still with the LUQ drain from 3/20.    - monitor output q8h  - consider removal when output <10c/24hr for 2 consecutive days  - care per primary team    IR will continue to follow

## 2023-03-31 NOTE — PROGRESS NOTE ADULT - SUBJECTIVE AND OBJECTIVE BOX
Patient is a 78y old  Male who presents with a chief complaint of A flutter (31 Mar 2023 07:12)      INTERVAL HPI/OVERNIGHT EVENTS: offers no new complaints; current symptoms resolving    MEDICATIONS  (STANDING):  aMIOdarone Infusion 0.5 mG/Min (16.7 mL/Hr) IV Continuous <Continuous>  chlorhexidine 2% Cloths 1 Application(s) Topical daily  enoxaparin Injectable 60 milliGRAM(s) SubCutaneous every 12 hours  fat emulsion (Fish Oil and Plant Based) 20% Infusion 0.7764 Gm/kG/Day (20.83 mL/Hr) IV Continuous <Continuous>  influenza  Vaccine (HIGH DOSE) 0.7 milliLiter(s) IntraMuscular once  lidocaine   4% Patch 1 Patch Transdermal daily  metoprolol tartrate Injectable 5 milliGRAM(s) IV Push every 6 hours  mirtazapine 30 milliGRAM(s) Oral at bedtime  nystatin Powder 1 Application(s) Topical two times a day  pantoprazole  Injectable 40 milliGRAM(s) IV Push every 12 hours  Parenteral Nutrition - Adult 1 Each (63 mL/Hr) TPN Continuous <Continuous>  Parenteral Nutrition - Adult 1 Each (63 mL/Hr) TPN Continuous <Continuous>  povidone iodine 10% Solution 1 Application(s) Topical daily  sodium chloride 0.9% lock flush 10 milliLiter(s) IV Push every 8 hours    MEDICATIONS  (PRN):  acetaminophen   IVPB .. 1000 milliGRAM(s) IV Intermittent once PRN Mild Pain (1 - 3)  benzocaine/menthol Lozenge 1 Lozenge Oral every 4 hours PRN Sore Throat  HYDROmorphone  Injectable 0.25 milliGRAM(s) IV Push every 4 hours PRN Moderate Pain (4 - 6)  HYDROmorphone  Injectable 0.5 milliGRAM(s) IV Push every 6 hours PRN Severe Pain (7 - 10)  ondansetron Injectable 4 milliGRAM(s) IV Push every 6 hours PRN Nausea and/or Vomiting  sodium chloride 0.9% lock flush 10 milliLiter(s) IV Push every 1 hour PRN Pre/post blood products, medications, blood draw, and to maintain line patency      __________________________________________________  REVIEW OF SYSTEMS:    CONSTITUTIONAL: No fever,   EYES: no acute visual disturbances  NECK: No pain or stiffness  RESPIRATORY: No cough; No shortness of breath  CARDIOVASCULAR: No chest pain, no palpitations  GASTROINTESTINAL: No pain. No nausea or vomiting; No diarrhea   NEUROLOGICAL: No headache or numbness, no tremors  MUSCULOSKELETAL: No joint pain, no muscle pain  GENITOURINARY: no dysuria, no frequency, no hesitancy  PSYCHIATRY: no depression , no anxiety  ALL OTHER  ROS negative        Vital Signs Last 24 Hrs  T(C): 36.6 (31 Mar 2023 13:00), Max: 36.7 (31 Mar 2023 09:33)  T(F): 97.8 (31 Mar 2023 13:00), Max: 98 (31 Mar 2023 09:33)  HR: 80 (31 Mar 2023 12:10) (72 - 80)  BP: 128/77 (31 Mar 2023 12:10) (116/73 - 152/78)  BP(mean): 97 (31 Mar 2023 12:10) (89 - 109)  RR: 18 (31 Mar 2023 12:10) (18 - 19)  SpO2: 96% (31 Mar 2023 12:10) (96% - 97%)    Parameters below as of 31 Mar 2023 12:10  Patient On (Oxygen Delivery Method): room air        ________________________________________________  PHYSICAL EXAM:  GENERAL: NAD  HEENT: Normocephalic;  conjunctivae and sclerae clear; dry mucous membranes;   NECK : no JVD   CHEST/LUNG: Clear to auscultation bilaterally with good air entry   HEART: S1 S2  regular; no murmurs, gallops or rubs  ABDOMEN: Soft, Nontender, Nondistended; Bowel sounds present, 4 dashawn Drain w moderate outpt   EXTREMITIES: L Aka , RLE w peeling ulceration in multiple stages of healing   SKIN: warm and dry   NERVOUS SYSTEM:  Awake and alert; Oriented  to place, person and president; no new deficits    _________________________________________________  LABS:                        9.1    24.70 )-----------( 614      ( 31 Mar 2023 08:49 )             30.3     03-31    136  |  101  |  14  ----------------------------<  139<H>  4.4   |  24  |  0.47<L>    Ca    8.4      31 Mar 2023 08:49  Phos  3.3     03-31  Mg     2.3     03-31          CAPILLARY BLOOD GLUCOSE      POCT Blood Glucose.: 134 mg/dL (31 Mar 2023 12:05)  POCT Blood Glucose.: 129 mg/dL (31 Mar 2023 06:43)  POCT Blood Glucose.: 128 mg/dL (31 Mar 2023 00:20)  POCT Blood Glucose.: 143 mg/dL (30 Mar 2023 17:34)        RADIOLOGY & ADDITIONAL TESTS:      Plan of care was discussed with patient and /or primary care giver; all questions and concerns were addressed and care was aligned with patient's wishes.

## 2023-03-31 NOTE — PROGRESS NOTE ADULT - ASSESSMENT
. 77M PMHx of IVDU found unresponsive at his nursing home, resolved after Narcan in the field and brought to [facility]. Found to have PE, atrial flutter, and large LV thrombus on echo. Transferred to [facility] for further management. Pt c/o abdominal pain on 12/10 CTA showing mid SMA with embolus. Abnormal distal small bowel loops and cecum with dilatation and pneumatosis suggesting infarcted bowel. Now s/p Ex lap, SMA embolectomy, 80cm SBR, abthera vac left in discontinuity (12/11). S/p second look (12/13), s/p OR for 3rd look, end-to-end anastomosis of remaining bowel, loop ileostomy and abdomen closure (12/15). s/p ileostomy reversal. Patient is now s/p upsizing of the two LUQ drains on 3/24 to 14 Fr (white) and 16 Fr (green) as well as placement of the RUQ drain on 3/24. Patient still with the LUQ drain from 3/20.    - monitor output q8h; per most recent discussion with surgery drains to remain in place currently  - care per primary team    IR will continue to follow

## 2023-03-31 NOTE — PROGRESS NOTE ADULT - ASSESSMENT
A/p: 77M PMHx of IVDU found unresponsive at his nursing home, resolved after Narcan in the field and brought to [facility]. Found to have PE, atrial flutter, and large LV thrombus on echo. Transferred to [facility] for further management. Pt c/o abdominal pain on 12/10 CTA showing mid SMA with embolus. Abnormal distal small bowel loops and cecum with dilatation and pneumatosis suggesting infarcted bowel. Now s/p Ex lap, SMA embolectomy, 80cm SBR, abthera vac left in discontinuity (12/11). S/p second look (12/13), s/p OR for 3rd look, end-to-end anastomosis of remaining bowel, loop ileostomy and abdomen closure (12/15). S/p LORENZO and Cardioversion on 12/30 with cardiology. Patient was nutritionally optimized. 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 s/p L AKA closure (3/1), s/p ileostomy reversal (3/7) c/b ileus. Course c/b upper GI bleeding 3/13 w/ coffee ground emesis (resolved), sepsis 2/2 intra-abdominal abscesses. Now s/p IR drainage w/ 2 drains in the LUQ and 1 in the pelvis w/ 750 cc total output (3/20).    Pain/Nausea PRN  Mirtazapine - depression/Appetite  NGT in place  Metoprolol 5mg q6, amiodorone - holding spirinolactone/entresto  Lovenox BID  PPI BID  PICC/TPN - Octreotide  Nystatin to butocks  IR drains z3 - no abx  DNR/DNI

## 2023-03-31 NOTE — PROGRESS NOTE ADULT - SUBJECTIVE AND OBJECTIVE BOX
STATUS POST:  12/11: Ex lap, SMA embolectomy, 80cm SBR, abthera vac  12/13: Second look, SBR  12/15: Ex-lap, no dead gut, DANIEL of discontinuous gut, loop ileostomy; EBL minimal, 1L crystalloid, UOP 150mL   12/30: LORENZO & Cardioversion on 12/30.   2/15: L AKA guillotine, EBL 10cc, IVF 400cc,   3/1: L AKA closure.   3/7: Ileostomy reversal. EBL 20, ,   3/20: IR drain x3  3/25: Upsize of LUQ nad placement subhepatic drain.      SUBJECTIVE: Patient seen and examined bedside by chief resident. This AM patient continues to ask about removal of NGT. Denies any abdominal discomfort. No N/V. No ROBF.    aMIOdarone Infusion 0.5 mG/Min IV Continuous <Continuous>  enoxaparin Injectable 60 milliGRAM(s) SubCutaneous every 12 hours  metoprolol tartrate Injectable 5 milliGRAM(s) IV Push every 6 hours      Vital Signs Last 24 Hrs  T(C): 36.3 (31 Mar 2023 05:38), Max: 36.7 (30 Mar 2023 09:20)  T(F): 97.3 (31 Mar 2023 05:38), Max: 98.1 (30 Mar 2023 09:20)  HR: 72 (31 Mar 2023 04:18) (72 - 78)  BP: 142/77 (31 Mar 2023 04:18) (115/68 - 142/77)  BP(mean): 103 (31 Mar 2023 04:18) (87 - 103)  RR: 18 (31 Mar 2023 04:18) (18 - 18)  SpO2: 96% (31 Mar 2023 04:18) (96% - 97%)    Parameters below as of 31 Mar 2023 04:18  Patient On (Oxygen Delivery Method): room air      I&O's Detail    30 Mar 2023 07:01  -  31 Mar 2023 07:00  --------------------------------------------------------  IN:    Amiodarone: 317.3 mL    Fat Emulsion (Fish Oil &amp; Plant Based) 20% Infusion: 187.2 mL    Fat Emulsion (Fish Oil &amp; Plant Based) 20% Infusion: 20.8 mL    TPN (Total Parenteral Nutrition): 1197 mL  Total IN: 1722.3 mL    OUT:    Bulb (mL): 32 mL    Bulb (mL): 2 mL    Drain (mL): 5 mL    Drain (mL): 5 mL    Nasogastric/Oral tube (mL): 650 mL    Voided (mL): 1650 mL  Total OUT: 2344 mL    Total NET: -621.7 mL          General: NAD, resting comfortably in bed  C/V: NSR  Pulm: Nonlabored breathing, no respiratory distress  Abd: soft, NT/ND. JPx4 3x serosanguinous, LUQ 1x purulent  Extrem: WWP, no edema, SCDs in place        LABS:                        8.5    23.87 )-----------( 689      ( 30 Mar 2023 06:53 )             27.3     03-30    134<L>  |  99  |  14  ----------------------------<  137<H>  4.2   |  25  |  0.52    Ca    8.0<L>      30 Mar 2023 06:53  Phos  3.4     03-30  Mg     2.0     03-30            RADIOLOGY & ADDITIONAL STUDIES:

## 2023-03-31 NOTE — PROGRESS NOTE ADULT - ASSESSMENT
78M PMHx of IVDU found unresponsive at his nursing home, resolved after Narcan in the field and brought to [facility]. Found to have PE, atrial flutter, and large LV thrombus on echo. Transferred to [facility] for further management. Pt c/o abdominal pain on 12/10 CTA showing mid SMA with embolus. Abnormal distal small bowel loops and cecum with dilatation and pneumatosis suggesting infarcted bowel. Now s/p Ex lap, SMA embolectomy, 80cm SBR, abthera vac left in discontinuity (12/11). S/p second look (12/13), s/p OR for 3rd look, end-to-end anastomosis of remaining bowel, loop ileostomy and abdomen closure (12/15). S/p LORENZO and Cardioversion on 12/30 with cardiology. 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 s/p L AKA closure (3/1), s/p ileostomy reversal (3/7) c/b ileus. Course c/b upper GI bleeding 3/13 w/ coffee ground emesis (resolved), sepsis 2/2 intra-abdominal abscesses. Now s/p IR drainage w/ 2 drains in the LUQ and 1 in the pelvis, 1 dashawn drain in RUQ     #bacteremia - k pneumoniae, VRE facium suspected from abdominal abscess  #post op state  3.7   #sepsis  Sepsis 2/2 intra-abdominal abscesses, s/p IR drains x3 (3/20) growing MDR Klebsiella pneumoniae, staph haemolyticus, VRE faecium, parabacteroides goldsteinii. LLE ischemia s/p L AKA 2/15 // DC: tigecycline (3/16-3/23), Caspo (3/14-3/23), Daptomycin 2/11-3/9; 3/10-16, 3/22-3/23), Zosyn, Fluconazole (3/9-13), Erta (2/13-3/10; 3/13), Tobra (3/13-16), Luis (2/10-12, 3/13-16), Vancomycin (2/10-2/11, 3/20-22). ID recommended to d/c all abx - futile/no options given resistance    # Bowel ischemia - s/p SMA embolectomy; Small bowel resection  # SBO/ileus   # intraabdominal abscess    -NGT removed 3/30  - ileostomy reversed in OR for 3/7  - on mirtazepine   - 4 x DASHAWN drain   - NPO w TPN   - rest of the management per primary team     #s/p left AKA  - s/p OR with vascular for wound closure     #anemia of chronic disease  #UGIB -resolved now on PPI bid   - hg 7.9 after 1 prbc - ->8.4 --7.9-8.9--8.6-->8->6->4.5--7.3->6.9- 1 u prbc (3/26) - > 8.3->9  - PPI BID   - no e/o active bleeding  - transfusion goal > 7     #Suicidal Ideation  #Insomnia   - Continue mirtazapine 30mg QHS   - psychiatry following, and appreciate any input    # HFrEF, not in exacerbation   - TTE from 2/11 reviewed, normal LVEF   - Metoprolol  continued  - not volume overloaded.  on hold entresto and spironolactone.      # Atrial Flutter  - s/p DCCV 12/30  - EP recs - uninterrupted AC x 30 days ; January 29th was 30 days post DCCV.   - on full dose Lovenox- and amiodarone drip and metoprolol iv  q6h     #hyponatremia  -nl Na 134- 138   would just monitor for now     # Pulmonary embolism (subsegmental RUL)   # LV thrombus w/ LLE ischemia  - After completion of AC for DCCV, would need to continue AC for PE - on FD lovenox       # Severe protein-calorie malnutrition  # Sacral wound   - appreciate input from nutrition  - Wound care per nursing    #DVT ppx - lovenox fd  #Palliative team following

## 2023-03-31 NOTE — PROGRESS NOTE ADULT - SUBJECTIVE AND OBJECTIVE BOX
Pt seen and examined. All drains flushed with 5 cc of sterile saline. Dressing c/d.    - LUQ anterior (white) OLGA#1 with 5 cc of  serosanguinous output over 24 hrs. 5 cc during the prior 24 hrs.  -LUQ lateral (green) OLGA #2 with 5cc of serous output over 24 hrs. 10cc during the prior 24 hrs.   -Pelvic drain OLGA #3 with 2 cc of seropurulent output over 24 hrs. 15 cc during the prior 24 hrs.  -RUQ subhepatic drain OLGA #4 with 32cc of serosanguinous output over 24 hrs. Previously 40cc prior 24 hr period

## 2023-04-01 NOTE — PROVIDER CONTACT NOTE (OTHER) - BACKGROUND
77M with PMHx of IVDU found unresponsive at his nursing home, resolved after Narcan in the field and brought to [facility]. Found to have PE, atrial flutter, and large LV thrombus on echo.
77M with PMHx of IVDU found unresponsive at his nursing home, resolved after Narcan in the field and brought to [facility].
77M with PMHx of IVDU found unresponsive at his nursing home, resolved after Narcan in the field and brought to [facility]. Found to have PE, atrial flutter, and large LV thrombus on echo.
Hard stick
s/p OR revision of L AKA
12/11 s/p exlap, SMA embolectomy, SBR
78 y/o 12/11: Ex lap, SMA embolectomy, 80cm SBR, abthera vac 12/15: Ex-lap, no dead gut, DANIEL of discontinuous gut, loop ileostomy; EBL minimal, 1L crystalloid, UOP 150mL
Patient s/p AKA of LLE.
Patient s/p SONYA MARINO.
Pt in with aflutter on Heparin gtt
Pt status post bowel reversal and LAKA
illeotosomy to gravity drainage. output 300 ml this morning
s/p L AKA on 2/15
77M with PMHx of IVDU found unresponsive at his nursing home, resolved after Narcan in the field and brought to [facility]. Found to have PE, atrial flutter, and large LV thrombus on echo.
BP dropped- ordered LR of bolus
Pt s/p exlap, ileostomy
Pt. s/p ileostomy creation. Pending LLE amputation due to necrosis.
pt admitted for PE and Afib; pending AKA
Colostomy
Hypotension
pt recently had L AKA closure on 3/1
Patient was febrile at 2100, received Tylenol + Ice pack + cooling blanket with rectal probe for monitoring. Had CXR, UA, BC and Labs drawn around midnight 3/2. Fever resolved and tachycardia improved
Pt admitted for small bowel obstruction and subsequent small bowel resection and ileostomy formation. 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
ileostomy
78 y/o M with Significant PMHx of IVDU found unresponsive at his nursing home, resolved after Narcan in the field, and brought to [facility]. Found to have PE, atrial flutter
Pt admitted for A fib, & ileostomy creation
Pt. s/p ileostomy creation. Pending LLE amputation.
Hypotension
Pt admitted for small bowel obstruction and recently had an above left knee amputation.
Pt s/p exlap, embolectomy, ileostomy
77 year old male s/p ex lap SMA embolectomy, SBR and loop ileostomy.

## 2023-04-01 NOTE — PROVIDER CONTACT NOTE (OTHER) - NAME OF MD/NP/PA/DO NOTIFIED:
Bev BLANK Team 4
Chin Hopkins MD
MD Chin Elkins
MD Efren Huertas
Mouna BLANK
Nelson Love
Efren Huertas
MD Gambino
MD Santana Harmon
Mouna Team 4
Nelson Love MD
ROSAMARIA Hilario
Checo Baca
Checo PLUMMER)
Dr. Steel
MD Ireland
MD Juice Hopkins
Mouna Farrell)
Team 4
Arturo Team 4
Arya Hawkins)
Dr. Orr
Ivan Damon MD
MD Gambino
MD Rivera
Team 4 Nelson Love
iCro Osborne MD
team 4
Bev BLANK Team 4
Efren Huertas Team 4
MD Chin Hopkins
MD Gambino
MD Gambino
Arya Hawkins)
Chin Hopkins MD
MD Praneeth Huertas
Mouna BLANK

## 2023-04-01 NOTE — PROGRESS NOTE ADULT - SUBJECTIVE AND OBJECTIVE BOX
Patient is a 78y old  Male who presents with a chief complaint of A flutter (31 Mar 2023 16:39)      INTERVAL HPI/OVERNIGHT EVENTS: abd pain N/V/ABD distention , fever 102    MEDICATIONS  (STANDING):  aMIOdarone Infusion 0.5 mG/Min (16.7 mL/Hr) IV Continuous <Continuous>  chlorhexidine 2% Cloths 1 Application(s) Topical daily  enoxaparin Injectable 60 milliGRAM(s) SubCutaneous every 12 hours  fat emulsion (Fish Oil and Plant Based) 20% Infusion 0.7764 Gm/kG/Day (20.8 mL/Hr) IV Continuous <Continuous>  influenza  Vaccine (HIGH DOSE) 0.7 milliLiter(s) IntraMuscular once  lidocaine   4% Patch 1 Patch Transdermal daily  metoprolol tartrate Injectable 5 milliGRAM(s) IV Push every 6 hours  mirtazapine 30 milliGRAM(s) Oral at bedtime  nystatin Powder 1 Application(s) Topical two times a day  pantoprazole  Injectable 40 milliGRAM(s) IV Push every 12 hours  Parenteral Nutrition - Adult 1 Each (63 mL/Hr) TPN Continuous <Continuous>  Parenteral Nutrition - Adult 1 Each (63 mL/Hr) TPN Continuous <Continuous>  povidone iodine 10% Solution 1 Application(s) Topical daily  sodium chloride 0.9% lock flush 10 milliLiter(s) IV Push every 8 hours    MEDICATIONS  (PRN):  benzocaine/menthol Lozenge 1 Lozenge Oral every 4 hours PRN Sore Throat  HYDROmorphone  Injectable 0.25 milliGRAM(s) IV Push every 4 hours PRN Moderate Pain (4 - 6)  HYDROmorphone  Injectable 0.5 milliGRAM(s) IV Push every 6 hours PRN Severe Pain (7 - 10)  ondansetron Injectable 4 milliGRAM(s) IV Push every 6 hours PRN Nausea and/or Vomiting  sodium chloride 0.9% lock flush 10 milliLiter(s) IV Push every 1 hour PRN Pre/post blood products, medications, blood draw, and to maintain line patency      __________________________________________________  REVIEW OF SYSTEMS:    CONSTITUTIONAL: No fever,   EYES: no acute visual disturbances  NECK: No pain or stiffness  RESPIRATORY: No cough; No shortness of breath  CARDIOVASCULAR: No chest pain, no palpitations  GASTROINTESTINAL: + pain. + nausea or vomiting; No diarrhea   NEUROLOGICAL: No headache or numbness, no tremors  MUSCULOSKELETAL: No joint pain, no muscle pain  GENITOURINARY: no dysuria, no frequency, no hesitancy  PSYCHIATRY: no depression , no anxiety  ALL OTHER  ROS negative        Vital Signs Last 24 Hrs  T(C): 37.3 (01 Apr 2023 14:10), Max: 38.9 (01 Apr 2023 09:30)  T(F): 99.2 (01 Apr 2023 14:10), Max: 102.1 (01 Apr 2023 09:30)  HR: 114 (01 Apr 2023 11:12) (78 - 114)  BP: 130/73 (01 Apr 2023 11:12) (113/69 - 142/74)  BP(mean): 94 (01 Apr 2023 11:12) (86 - 101)  RR: 18 (01 Apr 2023 11:12) (17 - 20)  SpO2: 96% (01 Apr 2023 11:12) (95% - 97%)    Parameters below as of 01 Apr 2023 11:12  Patient On (Oxygen Delivery Method): room air        ________________________________________________  PHYSICAL EXAM:  GENERAL: NAD  HEENT: Normocephalic;  conjunctivae and sclerae clear; dry mucous membranes; NGT in place  NECK : supple  CHEST/LUNG: Clear to auscultation bilaterally with good air entry   HEART: S1 S2  regular; no murmurs, gallops or rubs  ABDOMEN: rigid, distended + TTP, 4 drains in place  EXTREMITIES: L Aka , RLE w peeling ulceration in multiple stages of healing   NERVOUS SYSTEM:  Awake and alert; Oriented  to place, person and time ; no new deficits    _________________________________________________  LABS:                        8.4    26.68 )-----------( 587      ( 01 Apr 2023 06:59 )             27.5     04-01    136  |  102  |  16  ----------------------------<  128<H>  4.6   |  24  |  0.51    Ca    8.3<L>      01 Apr 2023 06:59  Phos  3.5     04-01  Mg     2.0     04-01    TPro  x   /  Alb  2.1<L>  /  TBili  x   /  DBili  x   /  AST  x   /  ALT  x   /  AlkPhos  x   04-01        CAPILLARY BLOOD GLUCOSE      POCT Blood Glucose.: 131 mg/dL (31 Mar 2023 17:07)        RADIOLOGY & ADDITIONAL TESTS:      Plan of care was discussed with patient and /or primary care giver; all questions and concerns were addressed and care was aligned with patient's wishes.

## 2023-04-01 NOTE — CHART NOTE - NSCHARTNOTEFT_GEN_A_CORE
Chief Resident Note    Notified by floor team that patient was acutely hypotensive , BP 84/50, sat 98% on RA. Pt assessed at bedside, AAOX3, complaining of increased abdominal pain and extremities warm to touch. Rectal temp taken and 103.1. 1 L bolus given with improvement in BP to 126/70, . Abdomen full, increased distention, increased tenderness, no rebound/guarding. OLGA drains were unchanged in cololr, serosanguinous and fibrinous.  Full repeat Labs and blood cultures sent. Decision made to defer antibiotic treatment of likely sepsis, given most recent ID recommendations regarding futility of antibiotics in the setting of his multidrug resistant bacteria and lack of available antibiotics that would match his susceptibility profile. CT A/P with IV contrast ordered to rule out acute increase in intra-abdominal fluid collections.  While awaiting imaging, approximately 30 min later, patient had second acute episode of hypotension to 80/50. 2L bolus administered with improvement to 96/40. Patient was transported to SICU for further monitoring, and ultimately required low dose levophed. Patient was discussed with Dr. Fisher and Dr. Lanier, and decision was made that risk of any operative intervention outweighs the potential benefit in terms of morbidity.     Given the lack of available antibiotic regimen and futility of reoperation, extensive of goals of care conversation was had with patient's sister, Sophie Flores ([phone]). At this time, she understands that any intervention or drainage procedure would not likely improve mortality and that risk of operation greatly outweighs any possible benefits. She supports her brother in whatever he decision he choses regarding care. I discussed the option of palliative care vs attempt at CT A/P with potential for repeat IR drainage extensively with Mr. Aleman. He states he would like to obtain CT imaging to assess if further drainable collections are present, and understands that drainage of a college may not improve overall outcomes. Will continue IVF resuscitation, levophed. Plan discussed with Dr. Lanier and Dr. Fisher.

## 2023-04-01 NOTE — PROVIDER CONTACT NOTE (OTHER) - RECOMMENDATIONS
For the team to re-assess the pt, IV Tylenol for fever. For the team to re-assess the pt, IV Tylenol order for fever.

## 2023-04-01 NOTE — PROGRESS NOTE ADULT - SUBJECTIVE AND OBJECTIVE BOX
Note significantly delayed d/t events during the day    STATUS POST:  : Ex lap, SMA embolectomy, 80cm SBR, abthera vac  : Second look, SBR  12/15: Ex-lap, no dead gut, DANIEL of discontinuous gut, loop ileostomy; EBL minimal, 1L crystalloid, UOP 150mL   : LORENZO & Cardioversion on .   2/15: L AKA guillotine, EBL 10cc, IVF 400cc,   3/1: L AKA closure.   3/7: Ileostomy reversal. EBL 20, ,   3/20: IR drain x3  3/25: Upsize of LUQ nad placement subhepatic drain.      SUBJECTIVE: Patient seen and examined bedside by chief resident. Patient significantly more distended this AM and having increased pain in the abdomen. Notes nausea. No BF at this time.    aMIOdarone Infusion 0.5 mG/Min IV Continuous <Continuous>  metoprolol tartrate Injectable 5 milliGRAM(s) IV Push every 6 hours  norepinephrine Infusion 0.05 MICROgram(s)/kG/Min IV Continuous <Continuous>      Vital Signs Last 24 Hrs  T(C): 39.2 (2023 20:00), Max: 39.2 (2023 20:00)  T(F): 102.6 (2023 20:00), Max: 102.6 (2023 20:00)  HR: 100 (2023 02:06) (88 - 114)  BP: 99/62 (2023 02:06) (79/52 - 140/70)  BP(mean): 75 (2023 02:06) (60 - 100)  RR: 17 (2023 02:06) (16 - 20)  SpO2: 100% (2023 02:06) (95% - 100%)    Parameters below as of 2023 01:00  Patient On (Oxygen Delivery Method): nasal cannula  O2 Flow (L/min): 6    I&O's Detail    31 Mar 2023 07:01  -  2023 07:00  --------------------------------------------------------  IN:    Amiodarone: 400.8 mL    Fat Emulsion (Fish Oil &amp; Plant Based) 20% Infusion: 249.6 mL    TPN (Total Parenteral Nutrition): 1512 mL  Total IN: 2162.4 mL    OUT:    Bulb (mL): 37 mL    Bulb (mL): 20 mL    Drain (mL): 17 mL    Drain (mL): 20 mL    Voided (mL): 1800 mL  Total OUT: 1894 mL    Total NET: 268.4 mL      2023 07:01  -  2023 03:42  --------------------------------------------------------  IN:    Amiodarone: 250.5 mL    Fat Emulsion (Fish Oil &amp; Plant Based) 20% Infusion: 124.8 mL    Norepinephrine: 49.3 mL    TPN (Total Parenteral Nutrition): 945 mL  Total IN: 1369.6 mL    OUT:    Bulb (mL): 25 mL    Bulb (mL): 4 mL    Drain (mL): 7 mL    Drain (mL): 7 mL    Incontinent per Condom Catheter (mL): 140 mL    Nasogastric/Oral tube (mL): 250 mL    Voided (mL): 250 mL  Total OUT: 683 mL    Total NET: 686.6 mL          General: NAD, resting comfortably in bed  C/V: NSR  Pulm: Nonlabored breathing, no respiratory distress  Abd: hard, distended, TTP RLQ  Extrem: WWP, no edema, SCDs in place        LABS:                        7.0    29.36 )-----------( 578      ( 2023 18:42 )             21.9     04-01    138  |  104  |  28<H>  ----------------------------<  111<H>  4.4   |  21<L>  |  0.92    Ca    7.7<L>      2023 18:42  Phos  3.3     04-01  Mg     1.9     04-01    TPro  x   /  Alb  2.1<L>  /  TBili  x   /  DBili  x   /  AST  x   /  ALT  x   /  AlkPhos  x         Urinalysis Basic - ( 2023 19:31 )    Color: Yellow / Appearance: SL Cloudy / S.020 / pH: x  Gluc: x / Ketone: NEGATIVE  / Bili: Negative / Urobili: 0.2 E.U./dL   Blood: x / Protein: 30 mg/dL / Nitrite: NEGATIVE   Leuk Esterase: NEGATIVE / RBC: < 5 /HPF / WBC < 5 /HPF   Sq Epi: x / Non Sq Epi: 0-5 /HPF / Bacteria: Present /HPF        RADIOLOGY & ADDITIONAL STUDIES:

## 2023-04-01 NOTE — PROVIDER CONTACT NOTE (OTHER) - REASON
Pt HR up to 140
Pt vomited 450ml and tachy
SBP 81 parameters are 100-160
patients blood pressure 72/47 8:25 , and then taken again at 8:37 was 101/66
temp 100.6
Fever
Patient is febrile
Patient only has 2 acesses
Pt had a rectal BM w/ a colostomy, pt was tachy to 120
Pt. describing desire to end their life.
Stat bolus ordered. Requesting US guided peripheral IV. Also to request order for PPN and lipids to be administered peripherally
patient drank 150 ml of contrast, patients states he is unable to tolerate any more
Elevated temperature 100.2
Patient expressing sucidial thoughts and mentioned he wants to call someone to bring him items that can help him commit suicide
Rectal temp 101.7, /100, pt actively shaking
patients blood pressure 84/54 taken on R leg
pt was febrile to 101.1F and was tachycardic to 109BPM
Fever 102.3
Patient Febrile
Patient complaining of nausea, vomited 100 ml liquid tan emesis
Pt febrile
Pt temp 101 F, tachycardic to 115
Tachycardia/ Low grade fever 100.4
Elevated temperature
Patient refused morning labs.
SBP 90, parameter 100-160, , parameter 
patient is in 10/10 pain with moaning and yelling for pain medications
patient refusing STAT dose of potasium powder and tablet
pt rectal temp of 102.1
BP 64/45
Patient doesn't have patent access for zosyn.
Patient knotted to have R sided facial swelling in the morning.
Pt c/o pain in left lower extremetity
Suicidal ideation concerns
patient unable to tolerate PO meds, persistent nausea and vomiting this morning
pt states he is hallucinating
Febrile and Tachycardic
Critical lab value Hgb 7.0
Pt. febrile with temperature of 102.1

## 2023-04-01 NOTE — PROGRESS NOTE ADULT - ASSESSMENT
78M PMHx of IVDU found unresponsive at his nursing home, resolved after Narcan in the field and brought to [facility]. Found to have PE, atrial flutter, and large LV thrombus on echo. Transferred to [facility] for further management. Pt c/o abdominal pain on 12/10 CTA showing mid SMA with embolus. Abnormal distal small bowel loops and cecum with dilatation and pneumatosis suggesting infarcted bowel. Now s/p Ex lap, SMA embolectomy, 80cm SBR, abthera vac left in discontinuity (12/11). S/p second look (12/13), s/p OR for 3rd look, end-to-end anastomosis of remaining bowel, loop ileostomy and abdomen closure (12/15). S/p LORENZO and Cardioversion on 12/30 with cardiology. 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 s/p L AKA closure (3/1), s/p ileostomy reversal (3/7) c/b ileus. Course c/b upper GI bleeding 3/13 w/ coffee ground emesis (resolved), sepsis 2/2 intra-abdominal abscesses. Now s/p IR drainage w/ 2 drains in the LUQ and 1 in the pelvis, 1 dashawn drain in RUQ     #bacteremia - k pneumoniae, VRE facium suspected from abdominal abscess  #post op state  3.7   #sepsis  Sepsis 2/2 intra-abdominal abscesses, s/p IR drains x3 (3/20) growing MDR Klebsiella pneumoniae, staph haemolyticus, VRE faecium, parabacteroides goldsteinii. LLE ischemia s/p L AKA 2/15 // DC: tigecycline (3/16-3/23), Caspo (3/14-3/23), Daptomycin 2/11-3/9; 3/10-16, 3/22-3/23), Zosyn, Fluconazole (3/9-13), Erta (2/13-3/10; 3/13), Tobra (3/13-16), Luis (2/10-12, 3/13-16), Vancomycin (2/10-2/11, 3/20-22). ID recommended to d/c all abx - futile/no options given resistance  - again having high fever of 102 - 4/1    # Bowel ischemia - s/p SMA embolectomy; Small bowel resection  # SBO/ileus   # intraabdominal abscess    -NGT removed 3/30-> replaced 4/1 when pt was noted to have abd pain N/V/ABD distention , fever 102  - ileostomy reversed in OR for 3/7  - on mirtazepine   - 4 x DASHAWN drain   - NPO w TPN   - rest of the management per primary team     #s/p left AKA  - s/p OR with vascular for wound closure     #anemia of chronic disease  #UGIB -resolved now on PPI bid   - hg 7.9 after 1 prbc - ->8.4 --7.9-8.9--8.6-->8->6->4.5--7.3->6.9- 1 u prbc (3/26) - > 8.3->9--8.4  - PPI BID   - no e/o active bleeding  - transfusion goal > 7     #Suicidal Ideation  #Insomnia   - Continue mirtazapine 30mg QHS   - psychiatry following, and appreciate any input    # HFrEF, not in exacerbation   - TTE from 2/11 reviewed, normal LVEF   - Metoprolol  continued  - not volume overloaded.  on hold entresto and spironolactone.      # Atrial Flutter  - s/p DCCV 12/30  - EP recs - uninterrupted AC x 30 days ; January 29th was 30 days post DCCV.   - on full dose Lovenox- and amiodarone drip and metoprolol iv  q6h     #hyponatremia  -nl Na 134- 138   would just monitor for now     # Pulmonary embolism (subsegmental RUL)   # LV thrombus w/ LLE ischemia  - After completion of AC for DCCV, would need to continue AC for PE - on FD lovenox       # Severe protein-calorie malnutrition  # Sacral wound   - appreciate input from nutrition  - Wound care per nursing    #DVT ppx - lovenox fd  #Palliative team following

## 2023-04-01 NOTE — PROGRESS NOTE ADULT - ASSESSMENT
A/p: 77M PMHx of IVDU found unresponsive at his nursing home, resolved after Narcan in the field and brought to [facility]. Found to have PE, atrial flutter, and large LV thrombus on echo. Transferred to [facility] for further management. Pt c/o abdominal pain on 12/10 CTA showing mid SMA with embolus. Abnormal distal small bowel loops and cecum with dilatation and pneumatosis suggesting infarcted bowel. Now s/p Ex lap, SMA embolectomy, 80cm SBR, abthera vac left in discontinuity (12/11). S/p second look (12/13), s/p OR for 3rd look, end-to-end anastomosis of remaining bowel, loop ileostomy and abdomen closure (12/15). S/p LORENZO and Cardioversion on 12/30 with cardiology. Patient was nutritionally optimized. 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 s/p L AKA closure (3/1), s/p ileostomy reversal (3/7) c/b ileus. Course c/b upper GI bleeding 3/13 w/ coffee ground emesis (resolved), sepsis 2/2 intra-abdominal abscesses. Now s/p IR drainage w/ 2 drains in the LUQ and 1 in the pelvis w/ 750 cc total output (3/20).    Plan  Replace NGT  Pain/Nausea PRN  Mirtazapine - depression/Appetite  NGT in place  Metoprolol 5mg q6, amiodorone - holding spirinolactone/entresto  Lovenox BID  PPI BID  PICC/TPN - Octreotide  Nystatin to butocks  IR drains z3 - no abx  DNR/DNI

## 2023-04-01 NOTE — CHART NOTE - NSCHARTNOTEFT_GEN_A_CORE
In AM patient complaining of increased abdominal pain with associated nausea and vomiting. On prior day, NGT was removed and patient tolerated well. Patient subsequently began to vomit light yellow-brown feculent material. Vitals at the time were significant for low grade tachycardia and temperature of 102.1. Due to recent cultures growing pan-resistant Klebsiella and decision to discontinue all antibiotics, decision was made to replace NGT and monitor symptomatically. Throughout day patient remained stable. At around 1800, Team 4 notified that patient began to vomit around NGT. Team 4 arrived bedside to troubleshoot NGT. On arrival, SBP was noted to be in the 80s with worsened tachycardia to 120-130s and temp of 102.7. Chief notified and arrived bedside. Patient was started on 2L via pressure bag and stat labs/cultures were drawn. Patient responded appropriately to 1L bolus and the 2nd L was held. Although initially stable, patient pressures began to decrease to SBP 80s and the 2L more of LR bolus on pressure bag were given. SICU notified and levophed brought to bedside. Plan was made to upgrade to SICU for septic shock and hemodynamic monitoring and evaluation with CT A/P. In AM patient complaining of increased abdominal pain with associated nausea and vomiting. On prior day, NGT was removed and patient tolerated well. Patient subsequently began to vomit light yellow-brown feculent material. Vitals at the time were significant for low grade tachycardia and temperature of 102.1. Due to recent cultures growing pan-resistant Klebsiella and decision to discontinue all antibiotics, decision was made to replace NGT and monitor symptomatically. Throughout day patient remained stable. At around 1800, Team 4 notified that patient began to vomit around NGT. Team 4 arrived bedside to troubleshoot NGT. On arrival, SBP was noted to be in the 80s with worsened tachycardia to 120-130s and temp of 102.7. Chief notified and arrived bedside. Patient was started on 2L via pressure bag and stat labs/cultures were drawn. Patient responded appropriately to 1L bolus and the 2nd L was held. Although initially stable, patient pressures began to decrease to SBP 80s and 2L more of LR on pressure bag was given. SICU notified and levophed brought to bedside. Plan was made to upgrade to SICU for septic shock and hemodynamic monitoring and evaluation with CT A/P.

## 2023-04-01 NOTE — PROVIDER CONTACT NOTE (OTHER) - DATE AND TIME:
02-Mar-2023 01:26
04-Mar-2023 05:18
15-Ladarius-2023 09:00
15-Ladarius-2023 10:55
23-Mar-2023 00:10
30-Jan-2023 09:00
04-Feb-2023 16:55
10-Feb-2023 07:45
19-Jan-2023 22:15
22-Jan-2023 08:40
26-Jan-2023 09:10
27-Jan-2023 14:30
01-Apr-2023 10:50
04-Mar-2023 10:39
05-Feb-2023 08:50
07-Dec-2022 10:30
12-Feb-2023 18:50
15-Ladarius-2023 11:48
15-Ladarius-2023 16:20
31-Jan-2023 12:27
01-Feb-2023 16:55
05-Mar-2023 13:15
08-Mar-2023 23:33
15-Ladarius-2023 03:35
26-Jan-2023 17:19
01-Feb-2023 16:50
06-Mar-2023 09:45
08-Feb-2023 19:00
10-Feb-2023 12:00
18-Feb-2023 07:45
04-Feb-2023 10:30
14-Jan-2023 16:47
04-Feb-2023 19:07
23-Feb-2023 06:33
01-Mar-2023 20:45
14-Jan-2023 21:19
18-Feb-2023 08:00
19-Jan-2023 21:17
20-Jan-2023 16:35

## 2023-04-01 NOTE — PROVIDER CONTACT NOTE (OTHER) - ASSESSMENT
Pt complains of pain, abdomen distended, round and hard, pt vomited yellowish content about 250 cc in 2 occasions. Pt vitals are /70 , SPO2 96 t 102.1.

## 2023-04-01 NOTE — PROVIDER CONTACT NOTE (OTHER) - ACTION/TREATMENT ORDERED:
Pt was assessed by MD Love, IV Tylenol was ordered and administered, NG tube was placed for decompression of the stomach, PRN Zofran administered for nausea. Will continue to monitor as per MD

## 2023-04-02 NOTE — PROGRESS NOTE ADULT - ASSESSMENT
77M PMHx of IVDU found unresponsive at his nursing home, resolved after Narcan in the field and brought to [facility]. Found to have PE, atrial flutter, and large LV thrombus on echo. Transferred to [facility] for further management. Pt c/o abdominal pain on 12/10 CTA showing mid SMA with embolus. Abnormal distal small bowel loops and cecum with dilatation and pneumatosis suggesting infarcted bowel. Now s/p Ex lap, SMA embolectomy, 80cm SBR, abthera vac left in discontinuity (12/11). S/p second look (12/13), s/p OR for 3rd look, end-to-end anastomosis of remaining bowel, loop ileostomy and abdomen closure (12/15). S/p LORENZO and Cardioversion on 12/30 with cardiology. Patient was nutritionally optimized. 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 s/p L AKA closure (3/1), s/p ileostomy reversal (3/7) c/b ileus. Course c/b upper GI bleeding 3/13 w/ coffee ground emesis (resolved), sepsis 2/2 intra-abdominal abscesses. Now s/p IR drainage x2 in the LUQ and x1 in the pelvis (3/20).    Neuro: Comfort and Pain: LidoDerm, Tylenol PRN, Dilaudid PRN, Ativan PRN for tachypnea. Nausea: Zofran PRN.  ENT: Cepacol for sore throat  CV: Septic shock on levophed gtt and unable to obtain source control. Repeat echo with EF (02/11) 55-60%. Mildly dilated RV. Maintain MAP >65. A.Fib/Flutter: s/p cardioversion on 12/30. Cont metoprolol 5 q6 and IV amiodarone; hx HTN: holding spironolactone, Entresto. Stop Lovenox for AC.  Pulm: Comfortable on RA  GI/FEN: s/p ostomy reversal (3/7). Upper GI bleed (resolved): on PPI BID. SBO/ileus: Water and Gingerale cleared for comfort purposes, NGT to LIWS to maintain decrompression and prevent nausea. Stop TPN.  : GOLDIE secondary to septic shock oliguric.  Endo: mISS  Heme: LV thrombus w/ LLE ischemia: Lovenox 60 bid HELD.  Active T&S.  ID: Cont Nystatin powder to buttocks. Sepsis 2/2 intra-abdominal abscesses, s/p IR drains x3 (3/20) growing MDR Klebsiella pneumoniae, staph haemolyticus, VRE faecium, parabacteroides goldsteinii. LLE ischemia s/p L AKA 2/15 // DC: tigecycline (3/16-3/23), Caspo (3/14-3/23), Daptomycin 2/11-3/9; 3/10-16, 3/22-3/23), Zosyn, Fluconazole (3/9-13), Erta (2/13-3/10; 3/13), Tobra (3/13-16), Luis (2/10-12, 3/13-16), Vancomycin (2/10-2/11, 3/20-22). ID recommending d/c all abx - futile/no options given resistance  PPX: SCDs, Lovenox 60 bid HELD  L/D/T: PIV, LUQ drains x2, LLQ drain, RUQ x 1 (4 total)  PT/OT: Ordered  Dispo: SDU GOC: Pt is DNR DNI with poor prognosis given multi drug resistant organisms unable to be treated by antibiotics or drainage. Abdominal sepsis will be terminal event and pt/family aware and pursuing comfort measures. Discussed with family, surgery, and palliative care.      77M PMHx of IVDU found unresponsive at his nursing home, resolved after Narcan in the field and brought to [facility]. Found to have PE, atrial flutter, and large LV thrombus on echo. Transferred to [facility] for further management. Pt c/o abdominal pain on 12/10 CTA showing mid SMA with embolus. Abnormal distal small bowel loops and cecum with dilatation and pneumatosis suggesting infarcted bowel. Now s/p Ex lap, SMA embolectomy, 80cm SBR, abthera vac left in discontinuity (12/11). S/p second look (12/13), s/p OR for 3rd look, end-to-end anastomosis of remaining bowel, loop ileostomy and abdomen closure (12/15). S/p LORENZO and Cardioversion on 12/30 with cardiology. Patient was nutritionally optimized. 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 s/p L AKA closure (3/1), s/p ileostomy reversal (3/7) c/b ileus. Course c/b upper GI bleeding 3/13 w/ coffee ground emesis (resolved), sepsis 2/2 intra-abdominal abscesses. Now s/p IR drainage x2 in the LUQ and x1 in the pelvis (3/20).    Neuro: Comfort and Pain: LidoDerm, Tylenol PRN, Dilaudid PRN, Ativan PRN for tachypnea. Nausea: Zofran PRN.  ENT: Cepacol for sore throat  CV: Septic shock on levophed gtt and unable to obtain source control. Repeat echo with EF (02/11) 55-60%. Mildly dilated RV. Maintain MAP >65. A.Fib/Flutter: s/p cardioversion on 12/30. Cont metoprolol 5 q6 and IV amiodarone; hx HTN: holding spironolactone, Entresto. Stop Lovenox for AC.  Pulm: Comfortable on RA  GI/FEN: s/p ostomy reversal (3/7). Upper GI bleed (resolved): on PPI BID. SBO/ileus: Water and Gingerale cleared for comfort purposes, NGT to LIWS to maintain decrompression and prevent nausea. Stop TPN.  : GOLDIE secondary to septic shock oliguric.  Endo: mISS  Heme: LV thrombus w/ LLE ischemia: Lovenox 60 bid HELD.  Active T&S.  ID: Cont Nystatin powder to buttocks. Sepsis 2/2 intra-abdominal abscesses, s/p IR drains x3 (3/20) growing MDR Klebsiella pneumoniae, staph haemolyticus, VRE faecium, parabacteroides goldsteinii. LLE ischemia s/p L AKA 2/15 // DC: tigecycline (3/16-3/23), Caspo (3/14-3/23), Daptomycin 2/11-3/9; 3/10-16, 3/22-3/23), Zosyn, Fluconazole (3/9-13), Erta (2/13-3/10; 3/13), Tobra (3/13-16), Luis (2/10-12, 3/13-16), Vancomycin (2/10-2/11, 3/20-22). ID recommending d/c all abx - futile/no options given resistance  PPX: SCDs, Lovenox 60 bid HELD  L/D/T: PIV, LUQ drains x2, LLQ drain, RUQ x 1 (4 total)  PT/OT: Ordered  Dispo: SDU GOC: Pt is DNR DNI with poor prognosis given multi drug resistant organisms unable to be treated by antibiotics or drainage. Abdominal sepsis will be terminal event and patient and sister (Sophie) aware and pursuing comfort measures. Discussed with family, surgery, and palliative care.

## 2023-04-02 NOTE — PROGRESS NOTE ADULT - SUBJECTIVE AND OBJECTIVE BOX
PULMONARY OP  PROGRESS NOTE      Patient:  Ozzy Reyes  YOB: 1963    MRN: [id number]     Acct:        Pt seen and Chart reviewed. Ms. Ozzy Reyes is here in followup for     Diagnosis Orders   1. Venous thromboembolism     2. SUSY (obstructive sleep apnea)     3. Moderate persistent asthma without complication     4. Essential hypertension     5. Thrombophilia ([address]     6. Morbid obesity due to excess calories ([address]     7. Post-nasal drip     8. Long term current use of anticoagulant therapy        Patient has been doing well since last visit. Pt has not been hospitalized or been to er since last visit. Has been using meds as recommended. Patient has not been using her positive airway pressure therapy every night  Has daytime sleepiness and fatigue and tiredness when she does not use it  On anticoagulation for VTE with Eliquis  Denied any shortness of breath or wheezing. No cough or any sputum production. No hemoptysis. No epistaxis or sore throat. No daytime fatigue or tiredness or sleepiness. No orthopnea or PND. No hematemesis, melena or hematochezia. No vaginal bleeding. No motor vehicle accidents. No symptoms of narcolepsy. Occasionally feels stuffy related to change in weather. No nighttime symptoms of bronchial asthma. Hardly needs any albuterol  Continues to make efforts to lose weight  Patient having symptoms of acid reflux intermittently.   She also has burping  Being evaluated by GI for possible EGD and colonoscopy      Subjective:   Review of Systems -   General ROS: Completed and except as mentioned above were negative   Psychological ROS:  Completed and except as mentioned above were negative  Ophthalmic ROS:  Completed and except as mentioned above were negative  ENT ROS:  Completed and except as mentioned above were negative  Allergy and Immunology ROS:  Completed and except as mentioned above were MG TABS tablet, TAKE 1 TABLET BY MOUTH TWICE DAILY, Disp: 180 tablet, Rfl: 1    aspirin 81 MG chewable tablet, Take 1 tablet by mouth daily, Disp: 30 tablet, Rfl: 3    metoprolol tartrate (LOPRESSOR) 25 MG tablet, Take 0.5 tablets by mouth 2 times daily, Disp: 60 tablet, Rfl: 3    PREVIDENT 5000 BOOSTER PLUS 1.1 % PSTE, , Disp: , Rfl: 0    cetirizine (ZYRTEC) 10 MG tablet, Take 10 mg by mouth daily. , Disp: , Rfl:     budesonide-formoterol (SYMBICORT) 160-4.5 MCG/ACT AERO, Inhale 2 puffs into the lungs 2 times daily, Disp: 1 Inhaler, Rfl: 5      Objective:    Physical Exam:  Vitals:   /67   Pulse 83   Temp 98.1 °F (36.7 °C)   Resp 16   Ht 5' 6\" (1.676 m)   Wt 287 lb (130.2 kg)   SpO2 95%   BMI 46.32 kg/m²   Last 3 weights: Wt Readings from Last 3 Encounters:   06/21/21 287 lb (130.2 kg)   06/15/21 288 lb (130.6 kg)   05/27/21 288 lb 6.4 oz (130.8 kg)     Body mass index is 46.32 kg/m². Physical Examination:   General appearance - alert, well appearing, and in no distress, Morbidly obese  Mental status - alert, oriented to person, place, and time  Nose - normal and patent, no erythema, discharge or polyps  Mouth - mucous membranes moist, pharynx normal without lesions  Neck - supple, no significant adenopathy  Chest - clear to auscultation, no wheezes, rales or rhonchi, symmetric air entry  Heart - normal rate, regular rhythm, normal S1, S2, no murmurs, rubs, clicks or gallops  Abdomen - soft, nontender, nondistended, no masses or organomegaly  Extremities - no pedal edema noted  Skin - normal coloration and turgor, no rashes, no suspicious skin lesions noted     Labs:    none        Assessment:   Diagnosis Orders   1. Venous thromboembolism     2. SUSY (obstructive sleep apnea)     3. Moderate persistent asthma without complication     4. Essential hypertension     5. Thrombophilia ([address]     6. Morbid obesity due to excess calories ([address]     7. Post-nasal drip     8.  Long term current use of INTERVAL/OVERNIGHT EVENTS: Febrile and hypotensive, started on Levophed. Received 1U pRBC and LR bolus. Obtained CT A/P which showed extensive fluid collections and contrast extravasation concerning for leak.       SUBJECTIVE: This AM is doing well without pain. No N/V. No flatus or BM. No CP/SOB. Discussed with pt that he has resistant bacteria intra-abdomen as well as extensive fluid collections/leak that is not amenable to drainage/OR at this point. Pt wishes to be have water and gingerale for comfort purposes.     Neurologic Medications  HYDROmorphone  Injectable 0.5 milliGRAM(s) IV Push every 3 hours PRN Moderate Pain (4 - 6)  HYDROmorphone  Injectable 1 milliGRAM(s) IV Push every 3 hours PRN Severe Pain (7 - 10)  ondansetron Injectable 4 milliGRAM(s) IV Push every 6 hours PRN Nausea and/or Vomiting    Cardiovascular Medications  aMIOdarone Infusion 0.5 mG/Min IV Continuous <Continuous>  norepinephrine Infusion 0.1 MICROgram(s)/kG/Min IV Continuous <Continuous>    Gastrointestinal Medications  pantoprazole  Injectable 40 milliGRAM(s) IV Push every 12 hours  Parenteral Nutrition - Adult 1 Each TPN Continuous <Continuous>    Hematologic/Oncologic Medications  influenza  Vaccine (HIGH DOSE) 0.7 milliLiter(s) IntraMuscular once    Topical/Other Medications  benzocaine/menthol Lozenge 1 Lozenge Oral every 4 hours PRN Sore Throat  lidocaine   4% Patch 1 Patch Transdermal daily  nystatin Powder 1 Application(s) Topical two times a day    MEDICATIONS  (PRN):  benzocaine/menthol Lozenge 1 Lozenge Oral every 4 hours PRN Sore Throat  HYDROmorphone  Injectable 0.5 milliGRAM(s) IV Push every 3 hours PRN Moderate Pain (4 - 6)  HYDROmorphone  Injectable 1 milliGRAM(s) IV Push every 3 hours PRN Severe Pain (7 - 10)  ondansetron Injectable 4 milliGRAM(s) IV Push every 6 hours PRN Nausea and/or Vomiting  sodium chloride 0.9% lock flush 10 milliLiter(s) IV Push every 1 hour PRN Pre/post blood products, medications, blood draw, and to maintain line patency    I&O's Detail    2023 07:01  -  2023 07:00  --------------------------------------------------------  IN:    Amiodarone: 350.7 mL    Fat Emulsion (Fish Oil &amp; Plant Based) 20% Infusion: 249.6 mL    Norepinephrine: 98.6 mL    PRBCs (Packed Red Blood Cells): 300 mL    TPN (Total Parenteral Nutrition): 1323 mL  Total IN: 2321.9 mL    OUT:    Bulb (mL): 9 mL    Bulb (mL): 45 mL    Drain (mL): 12 mL    Drain (mL): 12 mL    Incontinent per Condom Catheter (mL): 190 mL    Nasogastric/Oral tube (mL): 600 mL    Voided (mL): 250 mL  Total OUT: 1118 mL    Total NET: 1203.9 mL    2023 07:01  -  2023 15:53  --------------------------------------------------------  IN:    Amiodarone: 133.6 mL    Norepinephrine: 17.6 mL    Norepinephrine: 53.7 mL    TPN (Total Parenteral Nutrition): 504 mL  Total IN: 708.9 mL    OUT:    Incontinent per Condom Catheter (mL): 175 mL    Nasogastric/Oral tube (mL): 350 mL  Total OUT: 525 mL    Total NET: 183.9 mL    Vital Signs Last 24 Hrs  T(C): 38.7 (2023 07:00), Max: 39.2 (2023 20:00)  T(F): 101.7 (2023 07:00), Max: 102.6 (2023 20:00)  HR: 105 (2023 14:00) (88 - 112)  BP: 96/66 (2023 14:00) (64/49 - 127/64)  BP(mean): 76 (2023 14:00) (54 - 92)  RR: 20 (2023 14:00) (12 - 28)  SpO2: 94% (2023 14:00) (90% - 100%)    Parameters below as of 2023 09:00  Patient On (Oxygen Delivery Method): room air    GENERAL: NAD, resting comfortably in bed, depressed mood, follows commands, answers questions appropriately  HEENT: NCAT, MMM  C/V: Normal rate, normal peripheral perfusion, no murmurs  PULM: Nonlabored breathing, no respiratory distress, CTA bl  ABD: Soft, ND, minimal TTP, drain purulent x1 and SS others, no rebound tenderness, no guarding  EXTREM: WWP, no edema, SCDs in place  NEURO: No focal deficits    LABS:                        9.7    37.01 )-----------( 519      ( 2023 06:14 )             29.8     04-02    136  |  101  |  39<H>  ----------------------------<  122<H>  5.1   |  22  |  1.18    Ca    7.4<L>      2023 06:14  Phos  3.7     04-02  Mg     1.9     04-02    TPro  x   /  Alb  2.1<L>  /  TBili  x   /  DBili  x   /  AST  x   /  ALT  x   /  AlkPhos  x   04-    Urinalysis Basic - ( 2023 19:31 )    Color: Yellow / Appearance: SL Cloudy / S.020 / pH: x  Gluc: x / Ketone: NEGATIVE  / Bili: Negative / Urobili: 0.2 E.U./dL   Blood: x / Protein: 30 mg/dL / Nitrite: NEGATIVE   Leuk Esterase: NEGATIVE / RBC: < 5 /HPF / WBC < 5 /HPF   Sq Epi: x / Non Sq Epi: 0-5 /HPF / Bacteria: Present /HPF    RADIOLOGY & ADDITIONAL STUDIES:  CT Abdomen and Pelvis w/ IV Cont:   ******PRELIMINARY REPORT******      ******PRELIMINARY REPORT******     ACC: [id number] EXAM:  CT ABDOMEN AND PELVIS IC   ORDERED BY: JOSE ANDUJAR     PROCEDURE DATE:  2023    ******PRELIMINARY REPORT******      ******PRELIMINARY REPORT******     INTERPRETATION:  [facility] RADIOLOGIST PRELIMINARY REPORT    Initial report created on 2023 5:09:59 AM EDT  PROCEDURE INFORMATION:  Exam: CT Abdomen And Pelvis With Contrast  Exam date and time: 2023 2:41 AM  Age: 78 years old  Clinical indication: Fever; Prior surgery; Surgery type: Multiple prior  abdmoinal surgeries due to ischemic bowel, c/b leak S/P IR drainage.    TECHNIQUE:  Imaging protocol: Computed tomography of the abdomen and pelvis with   contrast.    COMPARISON:  CT ABDOMEN AND PELVIS WITH ORAL CONTRAST WITH IV CONTRAST 3/23/2023 7:27   PM    FINDINGS:  Tubes, catheters and devices: Multiple percutaneous drainage catheters   present.    Pleural spaces: Moderate to large bilateral pleural effusions.    Liver: Normal. No mass.  Gallbladder and bile ducts: Vicarious excretion of contrast in the lumen   of the  gallbladder. No biliary ductal dilatation.  Pancreas: Unremarkable.  Spleen: Normal.  Adrenal glands: Normal. No mass.  Kidneys and ureters: Multiple bilateral renal cysts.  Stomach and bowel: On images 78-90 of axial series 3, there is an   irregular  loculated collection of gas, fluid, and contrast density, spanning  approximately 16 cm. It is unclear if this is a loop of bowel or if this   is a  collection of extravasated contrast/contained perforation/abscess. Diffuse  inflammatory thickening of the wall of the entire colon, compatible with  pancolitis. Multiple loops of relatively proximal to mid small bowel are   mildly  dilated/distendedwith dilute oral contrast with a questionable transition  point in the left lower quadrant. This may signify a partial mid small   bowel  obstruction or could be an ileus.  Appendix: Normal appendix.    Intraperitoneal space: Numerous partially confluent large volume  intraperitoneal abscesses in the abdomen and pelvis. Due to artifact   created by  scanning with large field of view and patient&apos;s arms in the field of   view as  well as diffuse mesenteric edema and relative paucity of intraperitoneal   fat,  the bowel is incredibly difficult to trace. As result, is difficult in   some  areas to distinguish what is bowel and what is abscess. There is oral   contrast  throughout the decompressed colon and there is dilute contrast in some of  the  loops of small bowel. In the low central mesentery (images 101-122 of   axial  series 3), there is an approximately 12 cm homogeneously hyperdense   collection  of non capsulated fluid which is most likely extravasated dilute contrast   from  small bowel at or near 1 of the anastomoses, less likely hematoma.   Multiple  foci of gas in the non dependent peritoneal cavity may be contained within  abscesses or may be pneumoperitoneum.  Vasculature: IVC is flattened and the mesenteric arteriesappear fairly  constricted suggesting hypotension/hypovolemia.  Lymph nodes: Unremarkable.  Urinary bladder: Unremarkable as visualized.  Reproductive: Unremarkable as visualized.  Bones/joints: Right hip joint effusion could be sterile or septic.  Soft tissues: Unremarkable.    Other findings: Emphysema.    IMPRESSION:  1.   Numerous partially confluent large volume intraperitoneal abscesses   in the  abdomen and pelvis. Due to artifact created by scanning with large field   of  view and patient&apos;s arms in the field of view as well as diffuse   mesenteric  edema and relative paucity of intraperitoneal fat, the bowel is incredibly  difficult to trace. As result, is difficult in some areas to distinguish   what  is bowel and what is abscess. There is oral contrast throughout the  decompressed colon and there is dilute contrast in some of the loops of   small  bowel.  2.   In the low central mesentery (images 101-122 of axial series 3),   there is  an approximately 12 cm homogeneously hyperdense collection of non   capsulated  fluid which is most likely extravasated dilute contrast from small bowel   at or  near 1 of the anastomoses, less likely hematoma.  3.   On images 78-90 of axial series 3, there is an irregular loculated  collection of gas, fluid, and contrast density, spanning approximately 16   cm.  It is unclear if this is a loop of bowel or if this is a collection of  extravasated contrast/contained perforation/abscess.  4.   Moderate to large bilateral pleural effusions.  5.   Diffuse inflammatory thickening of the wall of the entire colon,  compatible with pancolitis.  6.   Multiple foci of gas in the non dependent peritoneal cavity may be  contained within abscesses or may be pneumoperitoneum.  7.   IVC is flattened and the mesenteric arteries appear fairly   constricted  suggesting hypotension/hypovolemia.  8.   Right hip joint effusion could be sterile or septic.  9.   Multiple loops of relatively proximal to mid small bowel are mildly  dilated/distended with dilute oral contrast with a questionable transition  point in the left lower quadrant. This may signify a partial mid small   bowel  obstruction or could be an ileus.    ******PRELIMINARY REPORT******      ******PRELIMINARY REPORT******     SHIRLEY RAUSCH M.D.;[facility] RADIOLOGIST  This document is a PRELIMINARY interpretation and is pending final   attending approval. 2023  5:10AM (23 @ 02:53)    Urinalysis with Rflx Culture (collected 23 @ 19:31)    Culture - Blood (collected 23 @ 18:42)  Source: .Blood Blood-Peripheral  Preliminary Report (23 @ 07:00):    No growth at 12 hours    Culture - Blood (collected 23 @ 18:42)  Source: .Blood Blood-Peripheral  Preliminary Report (23 @ 07:00):    No growth at 12 hours     anticoagulant therapy        PLAN:    CPAP AT LEAST FOR 4 HRS, QHS AT 9 CM H2O. Recommend using it every night. Explained importance of compliance with treatment of sleep apnea and its benefits  WT LOSS. Patient is working on losing weight. Encouraged her to do so  [facility]  USE HUMIDIFIER AS NEEDED. QUESTIONS ANSWERED to her satisfaction  EXPLAINED IMPORTANCE OF COMPLIANCE WITH THERAPY, especially given that she has had history of thromboembolic disease of the lung  9. VACCINATIONS  - HAD FLU VACCINE  Patient did not want the COVID-19 vaccination  10. REC ANTICOAGULATION FOR LIFE BY HEME. Patient does not want to change to the newer Oral anticoagulants  11. F/U IN 12 MONTHS. 12.  Refill provided-Breo 200 to help improve the compliance. Patient to hold off on Symbicort while using Breo  13. Recommend changing the supper time to 4 hours before bedtime to help decrease acid reflux symptoms. Thank you for having us involved in the care of your patient. Please call us if you have any questions or concerns.         Devika Levy MD, MD             6/21/2021, 3:18 PM

## 2023-04-02 NOTE — PROGRESS NOTE ADULT - SUBJECTIVE AND OBJECTIVE BOX
SUBJECTIVE:  Patient seen and examined on AM rounds with chief resident. Yesterday, patient became febrile and hypotensive and consequently transferred to the SICU. He had a repeat CT scan showing hira extravasation in the abdomen. No surgical intervention able to be offered. This morning, patient is feeling ok. He reports abdominal pain, denies nausea, vomiting, fever, and chills. Currently NPO.     MEDICATIONS  (STANDING):  aMIOdarone Infusion 0.5 mG/Min (16.7 mL/Hr) IV Continuous <Continuous>  chlorhexidine 2% Cloths 1 Application(s) Topical daily  chlorhexidine 4% Liquid 1 Application(s) Topical <User Schedule>  influenza  Vaccine (HIGH DOSE) 0.7 milliLiter(s) IntraMuscular once  lidocaine   4% Patch 1 Patch Transdermal daily  norepinephrine Infusion 0.1 MICROgram(s)/kG/Min (11 mL/Hr) IV Continuous <Continuous>  nystatin Powder 1 Application(s) Topical two times a day  pantoprazole  Injectable 40 milliGRAM(s) IV Push every 12 hours  Parenteral Nutrition - Adult 1 Each (63 mL/Hr) TPN Continuous <Continuous>  povidone iodine 10% Solution 1 Application(s) Topical daily  sodium chloride 0.9% lock flush 10 milliLiter(s) IV Push every 8 hours    MEDICATIONS  (PRN):  benzocaine/menthol Lozenge 1 Lozenge Oral every 4 hours PRN Sore Throat  HYDROmorphone  Injectable 0.5 milliGRAM(s) IV Push every 3 hours PRN Moderate Pain (4 - 6)  HYDROmorphone  Injectable 1 milliGRAM(s) IV Push every 3 hours PRN Severe Pain (7 - 10)  LORazepam   Injectable 1 milliGRAM(s) IV Push every 3 hours PRN tachypnea >22 or discomfort  ondansetron Injectable 4 milliGRAM(s) IV Push every 6 hours PRN Nausea and/or Vomiting  sodium chloride 0.9% lock flush 10 milliLiter(s) IV Push every 1 hour PRN Pre/post blood products, medications, blood draw, and to maintain line patency      Vital Signs Last 24 Hrs  T(C): 38.7 (2023 07:00), Max: 39.2 (2023 20:00)  T(F): 101.7 (2023 07:00), Max: 102.6 (2023 20:00)  HR: 109 (2023 17:00) (88 - 112)  BP: 105/68 (2023 17:00) (64/49 - 127/64)  BP(mean): 81 (2023 17:00) (54 - 92)  RR: 14 (2023 17:00) (12 - 28)  SpO2: 100% (2023 17:00) (90% - 100%)    Parameters below as of 2023 09:00  Patient On (Oxygen Delivery Method): room air    Physical Exam:  General: NAD, resting comfortably in bed  Pulmonary: Nonlabored breathing, no respiratory distress with 2L NC  Cardiovascular: NSR. normotensive via pressors.   Abdominal: soft, diffusely tender, severe distension. OLGA 1-2 ss output. OLGA 3-4 purulent output. NGT in place  Extremities: WWP, normal strength    I&O's Summary    2023 07:01  -  2023 07:00  --------------------------------------------------------  IN: 2321.9 mL / OUT: 1118 mL / NET: 1203.9 mL    2023 07:01  -  2023 17:15  --------------------------------------------------------  IN: 880 mL / OUT: 543 mL / NET: 337 mL        LABS:                        9.7    37.01 )-----------( 519      ( 2023 06:14 )             29.8     04-02    136  |  101  |  39<H>  ----------------------------<  122<H>  5.1   |  22  |  1.18    Ca    7.4<L>      2023 06:14  Phos  3.7     04-02  Mg     1.9     04-02    TPro  x   /  Alb  2.1<L>  /  TBili  x   /  DBili  x   /  AST  x   /  ALT  x   /  AlkPhos  x   -      Urinalysis Basic - ( 2023 19:31 )    Color: Yellow / Appearance: SL Cloudy / S.020 / pH: x  Gluc: x / Ketone: NEGATIVE  / Bili: Negative / Urobili: 0.2 E.U./dL   Blood: x / Protein: 30 mg/dL / Nitrite: NEGATIVE   Leuk Esterase: NEGATIVE / RBC: < 5 /HPF / WBC < 5 /HPF   Sq Epi: x / Non Sq Epi: 0-5 /HPF / Bacteria: Present /HPF      CAPILLARY BLOOD GLUCOSE        LIVER FUNCTIONS - ( 2023 06:59 )  Alb: 2.1 g/dL / Pro: x     / ALK PHOS: x     / ALT: x     / AST: x     / GGT: x

## 2023-04-02 NOTE — PROGRESS NOTE ADULT - ASSESSMENT
. 77M PMHx of IVDU found unresponsive at his nursing home, resolved after Narcan in the field and brought to [facility]. Found to have PE, atrial flutter, and large LV thrombus on echo. Transferred to [facility] for further management. Pt c/o abdominal pain on 12/10 CTA showing mid SMA with embolus. Abnormal distal small bowel loops and cecum with dilatation and pneumatosis suggesting infarcted bowel. Now s/p Ex lap, SMA embolectomy, 80cm SBR, abthera vac left in discontinuity (12/11). S/p second look (12/13), s/p OR for 3rd look, end-to-end anastomosis of remaining bowel, loop ileostomy and abdomen closure (12/15). s/p ileostomy reversal. Patient is now s/p upsizing of the two LUQ drains on 3/24 to 14 Fr (white) and 16 Fr (green) as well as placement of the RUQ drain on 3/24. Patient still with the LUQ drain from 3/20.    - monitor output q8h; per most recent discussion with surgery drains to remain in place currently  - care per primary team    IR will continue to follow

## 2023-04-02 NOTE — PROGRESS NOTE ADULT - ASSESSMENT
A/p: 77M PMHx of IVDU found unresponsive at his nursing home, resolved after Narcan in the field and brought to [facility]. Found to have PE, atrial flutter, and large LV thrombus on echo. Transferred to [facility] for further management. Pt c/o abdominal pain on 12/10 CTA showing mid SMA with embolus. Abnormal distal small bowel loops and cecum with dilatation and pneumatosis suggesting infarcted bowel. Now s/p Ex lap, SMA embolectomy, 80cm SBR, abthera vac left in discontinuity (12/11). S/p second look (12/13), s/p OR for 3rd look, end-to-end anastomosis of remaining bowel, loop ileostomy and abdomen closure (12/15). S/p LORENZO and Cardioversion on 12/30 with cardiology. Patient was nutritionally optimized. 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 s/p L AKA closure (3/1), s/p ileostomy reversal (3/7) c/b ileus. Course c/b upper GI bleeding 3/13 w/ coffee ground emesis (resolved), sepsis 2/2 intra-abdominal abscesses. Now s/p IR drainage w/ 2 drains in the LUQ and 1 in the pelvis w/ 750 cc total output (3/20). Transferred back to SICU for hemodynamic monitoring.     Plan  GOC discussion   Pain/Nausea PRN  Mirtazapine - depression/Appetite  NGT in place  Metoprolol 5mg q6, amiodorone - holding spirinolactone/entresto  Lovenox BID  PPI BID  PICC/TPN - Octreotide  Nystatin to butocks  IR drains z3 - no abx  DNR/DNI

## 2023-04-02 NOTE — PROGRESS NOTE ADULT - SUBJECTIVE AND OBJECTIVE BOX
Pt seen and examined. All drains flushed with 5 cc of sterile saline without resistance. Dressing c/d.    - LUQ anterior (white) OLGA#1 with 12 cc of  serosanguinous output over 24 hrs. 17 cc during the prior 24 hrs.  -LUQ lateral (green) OLGA #2 with 12cc of serosanguinous output over 24 hrs. 20cc during the prior 24 hrs.   -Pelvic drain OLGA #3 with 45 cc of purulent output over 24 hrs. 37 cc during the prior 24 hrs.  -RUQ subhepatic drain OLGA #4 with 9 cc of thick purulent output over 24 hrs. Previously 20 cc prior 24 hr period

## 2023-04-03 NOTE — PROGRESS NOTE ADULT - NUTRITIONAL ASSESSMENT
This patient has been assessed with a concern for Malnutrition and has been determined to have a diagnosis/diagnoses of Severe protein-calorie malnutrition and Underweight (BMI < 19).    This patient is being managed with:   Diet NPO after Midnight-     NPO Start Date: 06-Mar-2023   NPO Start Time: 23:59  Except Medications  Entered: Mar  6 2023  6:30PM    Parenteral Nutrition - Adult-  Entered: Mar  6 2023  5:00PM    fat emulsion (Fish Oil and Plant Based) 20% Infusion-[Known as SMOFLIPID 20% Infusion]  50 Gram(s) in IV Solution 250 milliLiter(s) infuse at 20.83 mL/Hr  Dose Rate: 0.7764 Gm/kG/Day Infuse Over: 12 Hours; Stop After 12 Hours  Administration Instructions: Use 1.2 micron in-line filter  Entered: Mar  6 2023  5:00PM    Diet Regular-  Supplement Feeding Modality:  Oral  Ensure Max Cans or Servings Per Day:  1       Frequency:  Three Times a day  Entered: Feb 20 2023  8:30AM    
This patient has been assessed with a concern for Malnutrition and has been determined to have a diagnosis/diagnoses of Severe protein-calorie malnutrition and Underweight (BMI < 19).    This patient is being managed with:   Diet NPO-  Except Medications  Entered: Dec 19 2022  6:15PM    
This patient has been assessed with a concern for Malnutrition and has been determined to have a diagnosis/diagnoses of Severe protein-calorie malnutrition and Underweight (BMI < 19).    This patient is being managed with:   Diet NPO-  NPO for Procedure/Test     NPO Start Date: 22-Dec-2022   NPO Start Time: 00:01  Entered: Dec 22 2022  8:42AM    
This patient has been assessed with a concern for Malnutrition and has been determined to have a diagnosis/diagnoses of Severe protein-calorie malnutrition and Underweight (BMI < 19).    This patient is being managed with:   Diet Regular-    Start Time: 19:00  Supplement Feeding Modality:  Oral  Ensure Enlive Cans or Servings Per Day:  1       Frequency:  Three Times a day  Entered: Jan 20 2023  6:53AM    
This patient has been assessed with a concern for Malnutrition and has been determined to have a diagnosis/diagnoses of Severe protein-calorie malnutrition and Underweight (BMI < 19).    This patient is being managed with:   Diet Regular-  High Fiber (HIFIBER)  Tube Feeding Modality: Nasogastric  Jevity 1.2 Kip (JEVITY1.2RTH)  Total Volume for 24 Hours (mL): 240  Total Number of Cans: 1  Continuous  Starting Tube Feed Rate {mL per Hour}: 10  Until Goal Tube Feed Rate (mL per Hour): 10  Tube Feed Duration (in Hours): 24  Tube Feed Start Time: 08:00  Supplement Feeding Modality:  Oral  Ensure Max Cans or Servings Per Day:  1       Frequency:  Three Times a day  Entered: Jan 2 2023  9:40AM    
This patient has been assessed with a concern for Malnutrition and has been determined to have a diagnosis/diagnoses of Severe protein-calorie malnutrition and Underweight (BMI < 19).    This patient is being managed with:   Diet Regular-  High Fiber (HIFIBER)  Tube Feeding Modality: Nasogastric  Jevity 1.2 Kip (JEVITY1.2RTH)  Total Volume for 24 Hours (mL): 240  Total Number of Cans: 1  Continuous  Starting Tube Feed Rate {mL per Hour}: 10  Until Goal Tube Feed Rate (mL per Hour): 10  Tube Feed Duration (in Hours): 24  Tube Feed Start Time: 08:00  Supplement Feeding Modality:  Oral  Ensure Max Cans or Servings Per Day:  1       Frequency:  Three Times a day  Entered: Jan 2 2023  9:40AM    
This patient has been assessed with a concern for Malnutrition and has been determined to have a diagnosis/diagnoses of Severe protein-calorie malnutrition and Underweight (BMI < 19).    This patient is being managed with:   Diet Regular-  High Fiber (HIFIBER)  Tube Feeding Modality: Nasogastric  Vital 1 Kip (VITAL1)  Total Volume for 24 Hours (mL): 1080  Total Number of Cans: 5  Continuous  Starting Tube Feed Rate {mL per Hour}: 10  Increase Tube Feed Rate by (mL): 10     Every 3 hours  Until Goal Tube Feed Rate (mL per Hour): 45  Tube Feed Duration (in Hours): 24  Tube Feed Start Time: 08:00  Supplement Feeding Modality:  Oral  Ensure Max Cans or Servings Per Day:  1       Frequency:  Three Times a day  Entered: [place]  3 2023  5:04PM    
This patient has been assessed with a concern for Malnutrition and has been determined to have a diagnosis/diagnoses of Severe protein-calorie malnutrition and Underweight (BMI < 19).    This patient is being managed with:   Diet Regular-  Supplement Feeding Modality:  Oral  Ensure Surgery Cans or Servings Per Day:  1       Frequency:  Three Times a day  Entered: Feb 13 2023 12:52PM    
This patient has been assessed with a concern for Malnutrition and has been determined to have a diagnosis/diagnoses of Severe protein-calorie malnutrition and Underweight (BMI < 19).    This patient is being managed with:   Parenteral Nutrition - Adult-  Entered: Apr 1 2023  5:00PM    fat emulsion (Fish Oil and Plant Based) 20% Infusion-[Known as SMOFLIPID 20% Infusion]  50 Gram(s) in IV Solution 250 milliLiter(s) infuse at 20.8 mL/Hr  Dose Rate: 0.7764 Gm/kG/Day Infuse Over: 12 Hours; Stop After 12 Hours  Administration Instructions: Use 1.2 micron in-line filter  Entered: Apr 1 2023  5:00PM    Parenteral Nutrition - Adult-  Entered: Mar 31 2023  5:00PM    Diet NPO-  Except Medications  With Chewing Gum  With Hard Candy  Entered: Mar 31 2023  7:27AM  
This patient has been assessed with a concern for Malnutrition and has been determined to have a diagnosis/diagnoses of Severe protein-calorie malnutrition and Underweight (BMI < 19).    This patient is being managed with:   Parenteral Nutrition - Adult-  Entered: Jan 14 2023  5:00PM    fat emulsion (Fish Oil and Plant Based) 20% Infusion-[Known as SMOFLIPID 20% Infusion]  50 Gram(s) in IV Solution 250 milliLiter(s) infuse at 20.83 mL/Hr  Dose Rate: 0.7764 Gm/kG/Day Infuse Over: 12 Hours; Stop After 12 Hours  Administration Instructions: Use 1.2 micron in-line filter  Entered: Jan 14 2023  5:00PM    Diet Soft and Bite Sized-  Fiber/Residue Restricted (LOWFIBER)  Supplement Feeding Modality:  Oral  Ensure Enlive Cans or Servings Per Day:  2       Frequency:  Three Times a day  Entered: Jan 11 2023 12:21PM    
This patient has been assessed with a concern for Malnutrition and has been determined to have a diagnosis/diagnoses of Severe protein-calorie malnutrition and Underweight (BMI < 19).    This patient is being managed with:   Parenteral Nutrition - Adult-  Entered: Jan 26 2023  5:00PM    fat emulsion (Fish Oil and Plant Based) 20% Infusion-[Known as SMOFLIPID 20% Infusion]  50 Gram(s) in IV Solution 250 milliLiter(s) infuse at 20.8 mL/Hr  Dose Rate: 0.7764 Gm/kG/Day Infuse Over: 12 Hours; Stop After 12 Hours  Administration Instructions: Use 1.2 micron in-line filter  Entered: Jan 26 2023  5:00PM    Diet Regular-    Start Time: 19:00  Supplement Feeding Modality:  Oral  Ensure Enlive Cans or Servings Per Day:  1       Frequency:  Three Times a day  Entered: Jan 20 2023  6:53AM    
This patient has been assessed with a concern for Malnutrition and has been determined to have a diagnosis/diagnoses of Severe protein-calorie malnutrition and Underweight (BMI < 19).    This patient is being managed with:   Parenteral Nutrition - Adult-  Entered: Mar  1 2023  5:00PM    fat emulsion (Fish Oil and Plant Based) 20% Infusion-[Known as SMOFLIPID 20% Infusion]  50 Gram(s) in IV Solution 250 milliLiter(s) infuse at 20.83 mL/Hr  Dose Rate: 0.7764 Gm/kG/Day Infuse Over: 12 Hours; Stop After 12 Hours  Administration Instructions: Use 1.2 micron in-line filter  Entered: Mar  1 2023  5:00AM    Diet NPO after Midnight-     NPO Start Date: 01-Mar-2023   NPO Start Time: 23:59  Except Medications  Entered: Mar  1 2023  3:38AM    Parenteral Nutrition - Adult-  Entered: Feb 28 2023  5:00PM    Diet Regular-  Supplement Feeding Modality:  Oral  Ensure Max Cans or Servings Per Day:  1       Frequency:  Three Times a day  Entered: Feb 20 2023  8:30AM    
This patient has been assessed with a concern for Malnutrition and has been determined to have a diagnosis/diagnoses of Severe protein-calorie malnutrition and Underweight (BMI < 19).    This patient is being managed with:   Parenteral Nutrition - Adult-  Entered: Mar  6 2023  5:00PM    fat emulsion (Fish Oil and Plant Based) 20% Infusion-[Known as SMOFLIPID 20% Infusion]  50 Gram(s) in IV Solution 250 milliLiter(s) infuse at 20.8 mL/Hr  Dose Rate: 0.7764 Gm/kG/Day Infuse Over: 12 Hours; Stop After 12 Hours  Administration Instructions: Use 1.2 micron in-line filter  Entered: Mar  6 2023  5:00PM    Diet NPO after Midnight-     NPO Start Date: 06-Mar-2023   NPO Start Time: 23:59  Entered: Mar  6 2023  7:07AM    Parenteral Nutrition - Adult-  Entered: Mar  5 2023  5:00PM    Diet Regular-  Supplement Feeding Modality:  Oral  Ensure Max Cans or Servings Per Day:  1       Frequency:  Three Times a day  Entered: Feb 20 2023  8:30AM    
This patient has been assessed with a concern for Malnutrition and has been determined to have a diagnosis/diagnoses of Severe protein-calorie malnutrition and Underweight (BMI < 19).    This patient is being managed with:   Parenteral Nutrition - Adult-  Entered: Mar  6 2023  5:00PM    fat emulsion (Fish Oil and Plant Based) 20% Infusion-[Known as SMOFLIPID 20% Infusion]  50 Gram(s) in IV Solution 250 milliLiter(s) infuse at 20.83 mL/Hr  Dose Rate: 0.7764 Gm/kG/Day Infuse Over: 12 Hours; Stop After 12 Hours  Administration Instructions: Use 1.2 micron in-line filter  Entered: Mar  6 2023  5:00PM    Diet NPO after Midnight-     NPO Start Date: 06-Mar-2023   NPO Start Time: 23:59  Entered: Mar  6 2023  7:07AM    Parenteral Nutrition - Adult-  Entered: Mar  5 2023  5:00PM    Diet Regular-  Supplement Feeding Modality:  Oral  Ensure Max Cans or Servings Per Day:  1       Frequency:  Three Times a day  Entered: Feb 20 2023  8:30AM    
This patient has been assessed with a concern for Malnutrition and has been determined to have a diagnosis/diagnoses of Severe protein-calorie malnutrition and Underweight (BMI < 19).    This patient is being managed with:   Parenteral Nutrition - Adult-  Entered: Mar  7 2023  5:00PM    Diet NPO-  Entered: Mar  7 2023  4:50PM    fat emulsion (Fish Oil and Plant Based) 20% Infusion-[Known as SMOFLIPID 20% Infusion]  50 Gram(s) in IV Solution 250 milliLiter(s) infuse at 20.83 mL/Hr  Dose Rate: 0.7764 Gm/kG/Day Infuse Over: 12 Hours; Stop After 12 Hours  Administration Instructions: Use 1.2 micron in-line filter  Entered: Mar  7 2023  5:00AM    Parenteral Nutrition - Adult-  Entered: Mar  6 2023  5:00PM    
This patient has been assessed with a concern for Malnutrition and has been determined to have a diagnosis/diagnoses of Severe protein-calorie malnutrition and Underweight (BMI < 19).    This patient is being managed with:   Parenteral Nutrition - Adult-  Entered: Mar  8 2023  5:00PM    fat emulsion (Fish Oil and Plant Based) 20% Infusion-[Known as SMOFLIPID 20% Infusion]  50 Gram(s) in IV Solution 250 milliLiter(s) infuse at 20.8 mL/Hr  Dose Rate: 0.7764 Gm/kG/Day Infuse Over: 12 Hours; Stop After 12 Hours  Administration Instructions: Use 1.2 micron in-line filter  Entered: Mar  8 2023  5:00PM    Diet NPO-  Except Medications  Entered: Mar  8 2023  4:19PM    
This patient has been assessed with a concern for Malnutrition and has been determined to have a diagnosis/diagnoses of Severe protein-calorie malnutrition and Underweight (BMI < 19).    This patient is being managed with:   Parenteral Nutrition - Adult-  Entered: Mar 12 2023  5:00PM    fat emulsion (Fish Oil and Plant Based) 20% Infusion-[Known as SMOFLIPID 20% Infusion]  50 Gram(s) in IV Solution 250 milliLiter(s) infuse at 20.83 mL/Hr  Dose Rate: 0.7764 Gm/kG/Day Infuse Over: 12 Hours; Stop After 12 Hours  Administration Instructions: Use 1.2 micron in-line filter  Entered: Mar 12 2023  5:00PM    Diet NPO-  Except Medications  Entered: Mar  8 2023  4:19PM    
This patient has been assessed with a concern for Malnutrition and has been determined to have a diagnosis/diagnoses of Severe protein-calorie malnutrition and Underweight (BMI < 19).    This patient is being managed with:   Parenteral Nutrition - Adult-  Entered: Mar 13 2023  5:00PM    fat emulsion (Fish Oil and Plant Based) 20% Infusion-[Known as SMOFLIPID 20% Infusion]  50 Gram(s) in IV Solution 250 milliLiter(s) infuse at 20.83 mL/Hr  Dose Rate: 0.7764 Gm/kG/Day Infuse Over: 12 Hours; Stop After 12 Hours  Administration Instructions: Use 1.2 micron in-line filter  Entered: Mar 13 2023  5:00PM    Parenteral Nutrition - Adult-  Entered: Mar 12 2023  5:00PM    Diet NPO-  Except Medications  Entered: Mar  8 2023  4:19PM    
This patient has been assessed with a concern for Malnutrition and has been determined to have a diagnosis/diagnoses of Severe protein-calorie malnutrition and Underweight (BMI < 19).    This patient is being managed with:   Parenteral Nutrition - Adult-  Entered: Mar 17 2023  5:00PM    fat emulsion (Fish Oil and Plant Based) 20% Infusion-[Known as SMOFLIPID 20% Infusion]  50 Gram(s) in IV Solution 250 milliLiter(s) infuse at 20.83 mL/Hr  Dose Rate: 0.7764 Gm/kG/Day Infuse Over: 12 Hours; Stop After 12 Hours  Administration Instructions: Use 1.2 micron in-line filter  Entered: Mar 17 2023  5:00AM    Parenteral Nutrition - Adult-  Entered: Mar 16 2023  5:00PM    fat emulsion (Fish Oil and Plant Based) 20% Infusion-[Known as SMOFLIPID 20% Infusion]  50 Gram(s) in IV Solution 250 milliLiter(s) infuse at 20.83 mL/Hr  Dose Rate: 0.7764 Gm/kG/Day Infuse Over: 12 Hours; Stop After 12 Hours  Administration Instructions: Use 1.2 micron in-line filter  Entered: Mar 16 2023  5:00PM    Diet NPO-  Except Medications  Entered: Mar  8 2023  4:19PM    
This patient has been assessed with a concern for Malnutrition and has been determined to have a diagnosis/diagnoses of Severe protein-calorie malnutrition and Underweight (BMI < 19).    This patient is being managed with:   Parenteral Nutrition - Adult-  Entered: Mar 25 2023  5:00PM    fat emulsion (Fish Oil and Plant Based) 20% Infusion-[Known as SMOFLIPID 20% Infusion]  50 Gram(s) in IV Solution 250 milliLiter(s) infuse at 20.83 mL/Hr  Dose Rate: 0.7764 Gm/kG/Day Infuse Over: 12 Hours; Stop After 12 Hours  Administration Instructions: Use 1.2 micron in-line filter  Entered: Mar 25 2023  5:00PM    Parenteral Nutrition - Adult-  Entered: Mar 24 2023  5:00PM    Diet NPO-  Except Medications  Entered: Mar  8 2023  4:19PM  
This patient has been assessed with a concern for Malnutrition and has been determined to have a diagnosis/diagnoses of Severe protein-calorie malnutrition and Underweight (BMI < 19).    This patient is being managed with:   Parenteral Nutrition - Adult-  Entered: Mar 31 2023  5:00PM    fat emulsion (Fish Oil and Plant Based) 20% Infusion-[Known as SMOFLIPID 20% Infusion]  50 Gram(s) in IV Solution 250 milliLiter(s) infuse at 20.83 mL/Hr  Dose Rate: 0.7764 Gm/kG/Day Infuse Over: 12 Hours; Stop After 12 Hours  Administration Instructions: Use 1.2 micron in-line filter  Entered: Mar 31 2023  5:00PM    Diet NPO-  Except Medications  With Chewing Gum  With Hard Candy  Entered: Mar 31 2023  7:27AM    Parenteral Nutrition - Adult-  Entered: Mar 30 2023  5:00PM  
This patient has been assessed with a concern for Malnutrition and has been determined to have a diagnosis/diagnoses of Severe protein-calorie malnutrition and Underweight (BMI < 19).    This patient is being managed with:   fat emulsion (Fish Oil and Plant Based) 20% Infusion-[Known as SMOFLIPID 20% Infusion]  50 Gram(s) in IV Solution 250 milliLiter(s) infuse at 20.83 mL/Hr  Dose Rate: 0.7764 Gm/kG/Day Infuse Over: 12 Hours; Stop After 12 Hours  Administration Instructions: Use 1.2 micron in-line filter  Entered: Mar 21 2023  5:00PM    Parenteral Nutrition - Adult-  Entered: Mar 21 2023  5:00PM    Diet NPO-  Except Medications  Entered: Mar  8 2023  4:19PM    
This patient has been assessed with a concern for Malnutrition and has been determined to have a diagnosis/diagnoses of Severe protein-calorie malnutrition and Underweight (BMI < 19).    This patient is being managed with:   Diet NPO after Midnight-     NPO Start Date: 29-Dec-2022   NPO Start Time: 23:59  Entered: Dec 29 2022  6:12PM    Diet Regular-  High Fiber (HIFIBER)  Supplement Feeding Modality:  Oral  Ensure Max Cans or Servings Per Day:  1       Frequency:  Three Times a day  Entered: Dec 29 2022  8:47AM    
This patient has been assessed with a concern for Malnutrition and has been determined to have a diagnosis/diagnoses of Severe protein-calorie malnutrition and Underweight (BMI < 19).    This patient is being managed with:   Diet NPO after Midnight-     NPO Start Date: 29-Dec-2022   NPO Start Time: 23:59  Entered: Dec 29 2022  6:12PM    Diet Regular-  High Fiber (HIFIBER)  Supplement Feeding Modality:  Oral  Ensure Max Cans or Servings Per Day:  1       Frequency:  Three Times a day  Entered: Dec 29 2022  8:47AM    
This patient has been assessed with a concern for Malnutrition and has been determined to have a diagnosis/diagnoses of Severe protein-calorie malnutrition and Underweight (BMI < 19).    This patient is being managed with:   Diet NPO-  Entered: Dec 11 2022  2:57AM    
This patient has been assessed with a concern for Malnutrition and has been determined to have a diagnosis/diagnoses of Severe protein-calorie malnutrition and Underweight (BMI < 19).    This patient is being managed with:   Diet NPO-  Entered: Feb 10 2023  1:22PM    
This patient has been assessed with a concern for Malnutrition and has been determined to have a diagnosis/diagnoses of Severe protein-calorie malnutrition and Underweight (BMI < 19).    This patient is being managed with:   Diet NPO-  NPO for Procedure/Test     NPO Start Date: 22-Dec-2022   NPO Start Time: 00:01  Entered: Dec 22 2022  8:42AM    
This patient has been assessed with a concern for Malnutrition and has been determined to have a diagnosis/diagnoses of Severe protein-calorie malnutrition and Underweight (BMI < 19).    This patient is being managed with:   Diet NPO-  NPO for Procedure/Test     NPO Start Date: 22-Dec-2022   NPO Start Time: 00:01  Entered: Dec 22 2022  8:42AM    
This patient has been assessed with a concern for Malnutrition and has been determined to have a diagnosis/diagnoses of Severe protein-calorie malnutrition and Underweight (BMI < 19).    This patient is being managed with:   Diet NPO-  NPO for Procedure/Test     NPO Start Date: 22-Dec-2022   NPO Start Time: 00:01  Entered: Dec 22 2022  8:42AM    Diet NPO after Midnight-     NPO Start Date: 21-Dec-2022   NPO Start Time: 23:59  Entered: Dec 21 2022  1:53PM    Diet Low Fiber-  Entered: Dec 21 2022 10:49AM    
This patient has been assessed with a concern for Malnutrition and has been determined to have a diagnosis/diagnoses of Severe protein-calorie malnutrition and Underweight (BMI < 19).    This patient is being managed with:   Diet Regular-    Start Time: 19:00  Supplement Feeding Modality:  Oral  Ensure Enlive Cans or Servings Per Day:  1       Frequency:  Three Times a day  Entered: Jan 20 2023  6:53AM    
This patient has been assessed with a concern for Malnutrition and has been determined to have a diagnosis/diagnoses of Severe protein-calorie malnutrition and Underweight (BMI < 19).    This patient is being managed with:   Diet Regular-    Start Time: 19:00  Supplement Feeding Modality:  Oral  Ensure Enlive Cans or Servings Per Day:  1       Frequency:  Three Times a day  Entered: Jan 20 2023  6:53AM    Parenteral Nutrition - Adult-  Entered: Jan 19 2023  5:00PM    fat emulsion (Fish Oil and Plant Based) 20% Infusion-[Known as SMOFLIPID 20% Infusion]  20 Gram(s) in IV Solution 100 milliLiter(s) infuse at 8.33 mL/Hr  Dose Rate: 0.3106 Gm/kG/Day Infuse Over: 12 Hours; Stop After 12 Hours  Administration Instructions: Use 1.2 micron in-line filter  Entered: Jan 19 2023  5:00PM    
This patient has been assessed with a concern for Malnutrition and has been determined to have a diagnosis/diagnoses of Severe protein-calorie malnutrition and Underweight (BMI < 19).    This patient is being managed with:   Diet Regular-  High Fiber (HIFIBER)  Supplement Feeding Modality:  Oral  Ensure Max Cans or Servings Per Day:  1       Frequency:  Three Times a day  Entered: Dec 29 2022  8:47AM    
This patient has been assessed with a concern for Malnutrition and has been determined to have a diagnosis/diagnoses of Severe protein-calorie malnutrition and Underweight (BMI < 19).    This patient is being managed with:   Diet Regular-  Supplement Feeding Modality:  Oral  Ensure Surgery Cans or Servings Per Day:  1       Frequency:  Three Times a day  Entered: Feb 13 2023 12:52PM    
This patient has been assessed with a concern for Malnutrition and has been determined to have a diagnosis/diagnoses of Severe protein-calorie malnutrition and Underweight (BMI < 19).    This patient is being managed with:   Parenteral Nutrition - Adult-  Entered: Feb 1 2023  5:00PM    fat emulsion (Fish Oil and Plant Based) 20% Infusion-[Known as SMOFLIPID 20% Infusion]  50 Gram(s) in IV Solution 250 milliLiter(s) infuse at 20.8 mL/Hr  Dose Rate: 0.7764 Gm/kG/Day Infuse Over: 12 Hours; Stop After 12 Hours  Administration Instructions: Use 1.2 micron in-line filter  Entered: Feb 1 2023  5:00PM    Diet Regular-    Start Time: 19:00  Supplement Feeding Modality:  Oral  Ensure Enlive Cans or Servings Per Day:  1       Frequency:  Three Times a day  Entered: Jan 20 2023  6:53AM    
This patient has been assessed with a concern for Malnutrition and has been determined to have a diagnosis/diagnoses of Severe protein-calorie malnutrition and Underweight (BMI < 19).    This patient is being managed with:   Parenteral Nutrition - Adult-  Entered: Feb 25 2023  5:00PM    fat emulsion (Fish Oil and Plant Based) 20% Infusion-[Known as SMOFLIPID 20% Infusion]  50 Gram(s) in IV Solution 250 milliLiter(s) infuse at 20.83 mL/Hr  Dose Rate: 0.774 Gm/kG/Day Infuse Over: 12 Hours; Stop After 12 Hours  Administration Instructions: Use 1.2 micron in-line filter  Entered: Feb 25 2023  5:00PM    Diet Regular-  Supplement Feeding Modality:  Oral  Ensure Max Cans or Servings Per Day:  1       Frequency:  Three Times a day  Entered: Feb 20 2023  8:30AM    
This patient has been assessed with a concern for Malnutrition and has been determined to have a diagnosis/diagnoses of Severe protein-calorie malnutrition and Underweight (BMI < 19).    This patient is being managed with:   Parenteral Nutrition - Adult-  Entered: Feb 27 2023  5:00PM    fat emulsion (Fish Oil and Plant Based) 20% Infusion-[Known as SMOFLIPID 20% Infusion]  50 Gram(s) in IV Solution 250 milliLiter(s) infuse at 20.83 mL/Hr  Dose Rate: 0.774 Gm/kG/Day Infuse Over: 12 Hours; Stop After 12 Hours  Administration Instructions: Use 1.2 micron in-line filter  Entered: Feb 27 2023  5:00PM    Diet Regular-  Supplement Feeding Modality:  Oral  Ensure Max Cans or Servings Per Day:  1       Frequency:  Three Times a day  Entered: Feb 20 2023  8:30AM    
This patient has been assessed with a concern for Malnutrition and has been determined to have a diagnosis/diagnoses of Severe protein-calorie malnutrition and Underweight (BMI < 19).    This patient is being managed with:   Parenteral Nutrition - Adult-  Entered: Feb 5 2023  5:00PM    fat emulsion (Fish Oil and Plant Based) 20% Infusion-[Known as SMOFLIPID 20% Infusion]  50 Gram(s) in IV Solution 250 milliLiter(s) infuse at 20.8 mL/Hr  Dose Rate: 0.7764 Gm/kG/Day Infuse Over: 12 Hours; Stop After 12 Hours  Administration Instructions: Use 1.2 micron in-line filter  Entered: Feb 5 2023  5:00PM    Diet Regular-    Start Time: 19:00  Supplement Feeding Modality:  Oral  Ensure Enlive Cans or Servings Per Day:  1       Frequency:  Three Times a day  Entered: Jan 20 2023  6:53AM    
This patient has been assessed with a concern for Malnutrition and has been determined to have a diagnosis/diagnoses of Severe protein-calorie malnutrition and Underweight (BMI < 19).    This patient is being managed with:   Parenteral Nutrition - Adult-  Entered: Feb 6 2023  5:00PM    fat emulsion (Fish Oil and Plant Based) 20% Infusion-[Known as SMOFLIPID 20% Infusion]  50 Gram(s) in IV Solution 250 milliLiter(s) infuse at 20.8 mL/Hr  Dose Rate: 0.7764 Gm/kG/Day Infuse Over: 12 Hours; Stop After 12 Hours  Administration Instructions: Use 1.2 micron in-line filter  Entered: Feb 6 2023  5:00PM    Diet Regular-    Start Time: 19:00  Supplement Feeding Modality:  Oral  Ensure Enlive Cans or Servings Per Day:  1       Frequency:  Three Times a day  Entered: Jan 20 2023  6:53AM    
This patient has been assessed with a concern for Malnutrition and has been determined to have a diagnosis/diagnoses of Severe protein-calorie malnutrition and Underweight (BMI < 19).    This patient is being managed with:   Parenteral Nutrition - Adult-  Entered: Feb 9 2023  5:00PM    fat emulsion (Fish Oil and Plant Based) 20% Infusion-[Known as SMOFLIPID 20% Infusion]  50 Gram(s) in IV Solution 250 milliLiter(s) infuse at 20.8 mL/Hr  Dose Rate: 0.7764 Gm/kG/Day Infuse Over: 12 Hours; Stop After 12 Hours  Administration Instructions: Use 1.2 micron in-line filter  Entered: Feb 9 2023  5:00PM    Parenteral Nutrition - Adult-  Entered: Feb 8 2023  5:00PM    Diet Regular-    Start Time: 19:00  Supplement Feeding Modality:  Oral  Ensure Enlive Cans or Servings Per Day:  1       Frequency:  Three Times a day  Entered: Jan 20 2023  6:53AM    
This patient has been assessed with a concern for Malnutrition and has been determined to have a diagnosis/diagnoses of Severe protein-calorie malnutrition and Underweight (BMI < 19).    This patient is being managed with:   Parenteral Nutrition - Adult-  Entered: Jan 29 2023  5:00PM    fat emulsion (Fish Oil and Plant Based) 20% Infusion-[Known as SMOFLIPID 20% Infusion]  49.85 Gram(s) in IV Solution 249.25 milliLiter(s) infuse at 20.8 mL/Hr  Dose Rate: 0.774 Gm/kG/Day Infuse Over: 12 Hours; Stop After 12 Hours  Administration Instructions: Use 1.2 micron in-line filter  Entered: Jan 29 2023  5:00PM    Parenteral Nutrition - Adult-  Entered: Jan 28 2023  5:00PM    fat emulsion (Fish Oil and Plant Based) 20% Infusion-[Known as SMOFLIPID 20% Infusion]  50 Gram(s) in IV Solution 250 milliLiter(s) infuse at 20.83 mL/Hr  Dose Rate: 0.774 Gm/kG/Day Infuse Over: 12 Hours; Stop After 12 Hours  Administration Instructions: Use 1.2 micron in-line filter  Entered: Jan 28 2023  5:00PM    Diet Regular-    Start Time: 19:00  Supplement Feeding Modality:  Oral  Ensure Enlive Cans or Servings Per Day:  1       Frequency:  Three Times a day  Entered: Jan 20 2023  6:53AM    
This patient has been assessed with a concern for Malnutrition and has been determined to have a diagnosis/diagnoses of Severe protein-calorie malnutrition and Underweight (BMI < 19).    This patient is being managed with:   Parenteral Nutrition - Adult-  Entered: Jan 29 2023  5:00PM    fat emulsion (Fish Oil and Plant Based) 20% Infusion-[Known as SMOFLIPID 20% Infusion]  50 Gram(s) in IV Solution 250 milliLiter(s) infuse at 20.83 mL/Hr  Dose Rate: 0.774 Gm/kG/Day Infuse Over: 12 Hours; Stop After 12 Hours  Administration Instructions: Use 1.2 micron in-line filter  Entered: Jan 29 2023  5:00PM    Diet Regular-    Start Time: 19:00  Supplement Feeding Modality:  Oral  Ensure Enlive Cans or Servings Per Day:  1       Frequency:  Three Times a day  Entered: Jan 20 2023  6:53AM    
This patient has been assessed with a concern for Malnutrition and has been determined to have a diagnosis/diagnoses of Severe protein-calorie malnutrition and Underweight (BMI < 19).    This patient is being managed with:   Parenteral Nutrition - Adult-  Entered: Jan 30 2023  5:00PM    fat emulsion (Fish Oil and Plant Based) 20% Infusion-[Known as SMOFLIPID 20% Infusion]  50 Gram(s) in IV Solution 250 milliLiter(s) infuse at 20.8 mL/Hr  Dose Rate: 0.7764 Gm/kG/Day Infuse Over: 12 Hours; Stop After 12 Hours  Administration Instructions: Use 1.2 micron in-line filter  Entered: Jan 30 2023  5:00PM    Diet Regular-    Start Time: 19:00  Supplement Feeding Modality:  Oral  Ensure Enlive Cans or Servings Per Day:  1       Frequency:  Three Times a day  Entered: Jan 20 2023  6:53AM    
This patient has been assessed with a concern for Malnutrition and has been determined to have a diagnosis/diagnoses of Severe protein-calorie malnutrition and Underweight (BMI < 19).    This patient is being managed with:   Parenteral Nutrition - Adult-  Entered: Ladarius 15 2023  5:00PM    fat emulsion (Fish Oil and Plant Based) 20% Infusion-[Known as SMOFLIPID 20% Infusion]  20 Gram(s) in IV Solution 100 milliLiter(s) infuse at 8.33 mL/Hr  Dose Rate: 0.31 Gm/kG/Day Infuse Over: 12 Hours; Stop After 12 Hours  Administration Instructions: Use 1.2 micron in-line filter  Entered: Ladarius 15 2023  5:00PM    Diet NPO-  Entered: Ladarius 15 2023  1:37PM    Parenteral Nutrition - Adult-  Entered: Jan 14 2023  5:00PM    
This patient has been assessed with a concern for Malnutrition and has been determined to have a diagnosis/diagnoses of Severe protein-calorie malnutrition and Underweight (BMI < 19).    This patient is being managed with:   Parenteral Nutrition - Adult-  Entered: Mar  4 2023  5:00PM    fat emulsion (Fish Oil and Plant Based) 20% Infusion-[Known as SMOFLIPID 20% Infusion]  50 Gram(s) in IV Solution 250 milliLiter(s) infuse at 20.8 mL/Hr  Dose Rate: 0.7764 Gm/kG/Day Infuse Over: 12 Hours; Stop After 12 Hours  Administration Instructions: Use 1.2 micron in-line filter  Entered: Mar  4 2023  5:00PM    Diet Regular-  Supplement Feeding Modality:  Oral  Ensure Max Cans or Servings Per Day:  1       Frequency:  Three Times a day  Entered: Feb 20 2023  8:30AM    
This patient has been assessed with a concern for Malnutrition and has been determined to have a diagnosis/diagnoses of Severe protein-calorie malnutrition and Underweight (BMI < 19).    This patient is being managed with:   Parenteral Nutrition - Adult-  Entered: Mar  4 2023  5:00PM    fat emulsion (Fish Oil and Plant Based) 20% Infusion-[Known as SMOFLIPID 20% Infusion]  50 Gram(s) in IV Solution 250 milliLiter(s) infuse at 20.8 mL/Hr  Dose Rate: 0.7764 Gm/kG/Day Infuse Over: 12 Hours; Stop After 12 Hours  Administration Instructions: Use 1.2 micron in-line filter  Entered: Mar  4 2023  5:00PM    Parenteral Nutrition - Adult-  Entered: Mar  3 2023  5:00PM    Diet Regular-  Supplement Feeding Modality:  Oral  Ensure Max Cans or Servings Per Day:  1       Frequency:  Three Times a day  Entered: Feb 20 2023  8:30AM    
This patient has been assessed with a concern for Malnutrition and has been determined to have a diagnosis/diagnoses of Severe protein-calorie malnutrition and Underweight (BMI < 19).    This patient is being managed with:   Parenteral Nutrition - Adult-  Entered: Mar  5 2023  5:00PM    fat emulsion (Fish Oil and Plant Based) 20% Infusion-[Known as SMOFLIPID 20% Infusion]  50 Gram(s) in IV Solution 250 milliLiter(s) infuse at 20.8 mL/Hr  Dose Rate: 0.7764 Gm/kG/Day Infuse Over: 12 Hours; Stop After 12 Hours  Administration Instructions: Use 1.2 micron in-line filter  Entered: Mar  5 2023  5:00PM    Diet Regular-  Supplement Feeding Modality:  Oral  Ensure Max Cans or Servings Per Day:  1       Frequency:  Three Times a day  Entered: Feb 20 2023  8:30AM    
This patient has been assessed with a concern for Malnutrition and has been determined to have a diagnosis/diagnoses of Severe protein-calorie malnutrition and Underweight (BMI < 19).    This patient is being managed with:   Parenteral Nutrition - Adult-  Entered: Mar  7 2023  5:00PM    Diet NPO-  Entered: Mar  7 2023  4:50PM    
This patient has been assessed with a concern for Malnutrition and has been determined to have a diagnosis/diagnoses of Severe protein-calorie malnutrition and Underweight (BMI < 19).    This patient is being managed with:   Parenteral Nutrition - Adult-  Entered: Mar 16 2023  5:00PM    fat emulsion (Fish Oil and Plant Based) 20% Infusion-[Known as SMOFLIPID 20% Infusion]  50 Gram(s) in IV Solution 250 milliLiter(s) infuse at 20.83 mL/Hr  Dose Rate: 0.7764 Gm/kG/Day Infuse Over: 12 Hours; Stop After 12 Hours  Administration Instructions: Use 1.2 micron in-line filter  Entered: Mar 16 2023  5:00PM    Parenteral Nutrition - Adult-  Entered: Mar 15 2023  5:00PM    Diet NPO-  Except Medications  Entered: Mar  8 2023  4:19PM    
This patient has been assessed with a concern for Malnutrition and has been determined to have a diagnosis/diagnoses of Severe protein-calorie malnutrition and Underweight (BMI < 19).    This patient is being managed with:   Parenteral Nutrition - Adult-  Entered: Mar 18 2023  5:00PM    Diet NPO-  Except Medications  Entered: Mar  8 2023  4:19PM    
This patient has been assessed with a concern for Malnutrition and has been determined to have a diagnosis/diagnoses of Severe protein-calorie malnutrition and Underweight (BMI < 19).    This patient is being managed with:   Parenteral Nutrition - Adult-  Entered: Mar 18 2023  5:00PM    fat emulsion (Fish Oil and Plant Based) 20% Infusion-[Known as SMOFLIPID 20% Infusion]  50 Gram(s) in IV Solution 250 milliLiter(s) infuse at 20.83 mL/Hr  Dose Rate: 0.7764 Gm/kG/Day Infuse Over: 12 Hours; Stop After 12 Hours  Administration Instructions: Use 1.2 micron in-line filter  Entered: Mar 18 2023  5:00AM    Parenteral Nutrition - Adult-  Entered: Mar 17 2023  5:00PM    Diet NPO-  Except Medications  Entered: Mar  8 2023  4:19PM    
This patient has been assessed with a concern for Malnutrition and has been determined to have a diagnosis/diagnoses of Severe protein-calorie malnutrition and Underweight (BMI < 19).    This patient is being managed with:   Parenteral Nutrition - Adult-  Entered: Mar 18 2023  5:00PM    fat emulsion (Fish Oil and Plant Based) 20% Infusion-[Known as SMOFLIPID 20% Infusion]  50 Gram(s) in IV Solution 250 milliLiter(s) infuse at 20.83 mL/Hr  Dose Rate: 0.7764 Gm/kG/Day Infuse Over: 12 Hours; Stop After 12 Hours  Administration Instructions: Use 1.2 micron in-line filter  Entered: Mar 18 2023  5:00AM    Parenteral Nutrition - Adult-  Entered: Mar 17 2023  5:00PM    fat emulsion (Fish Oil and Plant Based) 20% Infusion-[Known as SMOFLIPID 20% Infusion]  50 Gram(s) in IV Solution 250 milliLiter(s) infuse at 20.83 mL/Hr  Dose Rate: 0.7764 Gm/kG/Day Infuse Over: 12 Hours; Stop After 12 Hours  Administration Instructions: Use 1.2 micron in-line filter  Entered: Mar 17 2023  5:00PM    Diet NPO-  Except Medications  Entered: Mar  8 2023  4:19PM    
This patient has been assessed with a concern for Malnutrition and has been determined to have a diagnosis/diagnoses of Severe protein-calorie malnutrition and Underweight (BMI < 19).    This patient is being managed with:   Parenteral Nutrition - Adult-  Entered: Mar 20 2023  5:00PM    fat emulsion (Fish Oil and Plant Based) 20% Infusion-[Known as SMOFLIPID 20% Infusion]  50 Gram(s) in IV Solution 250 milliLiter(s) infuse at 20.83 mL/Hr  Dose Rate: 0.7764 Gm/kG/Day Infuse Over: 12 Hours; Stop After 12 Hours  Administration Instructions: Use 1.2 micron in-line filter  Entered: Mar 20 2023  5:00PM    Diet NPO-  Except Medications  Entered: Mar  8 2023  4:19PM    
This patient has been assessed with a concern for Malnutrition and has been determined to have a diagnosis/diagnoses of Severe protein-calorie malnutrition and Underweight (BMI < 19).    This patient is being managed with:   Parenteral Nutrition - Adult-  Entered: Mar 25 2023  5:00PM    Diet NPO-  Except Medications  Entered: Mar  8 2023  4:19PM  
This patient has been assessed with a concern for Malnutrition and has been determined to have a diagnosis/diagnoses of Severe protein-calorie malnutrition and Underweight (BMI < 19).    This patient is being managed with:   fat emulsion (Fish Oil and Plant Based) 20% Infusion-[Known as SMOFLIPID 20% Infusion]  50 Gram(s) in IV Solution 250 milliLiter(s) infuse at 20.83 mL/Hr  Dose Rate: 0.7764 Gm/kG/Day Infuse Over: 12 Hours; Stop After 12 Hours  Administration Instructions: Use 1.2 micron in-line filter  Entered: Feb 23 2023  5:00PM    Parenteral Nutrition - Adult-  Entered: Feb 23 2023  5:00PM    Diet Regular-  Supplement Feeding Modality:  Oral  Ensure Max Cans or Servings Per Day:  1       Frequency:  Three Times a day  Entered: Feb 20 2023  8:30AM    
This patient has been assessed with a concern for Malnutrition and has been determined to have a diagnosis/diagnoses of Severe protein-calorie malnutrition and Underweight (BMI < 19).    This patient is being managed with:   Diet Clear Liquid-  Entered: Dec 19 2022 12:23PM    
This patient has been assessed with a concern for Malnutrition and has been determined to have a diagnosis/diagnoses of Severe protein-calorie malnutrition and Underweight (BMI < 19).    This patient is being managed with:   Diet Clear Liquid-  Supplement Feeding Modality:  Oral  Ensure Clear Cans or Servings Per Day:  1       Frequency:  Three Times a day  Entered: Dec 27 2022  7:57AM    
This patient has been assessed with a concern for Malnutrition and has been determined to have a diagnosis/diagnoses of Severe protein-calorie malnutrition and Underweight (BMI < 19).    This patient is being managed with:   Diet Clear Liquid-  Supplement Feeding Modality:  Oral  Ensure Enlive Cans or Servings Per Day:  1       Frequency:  Three Times a day  Entered: Dec 25 2022  1:50PM    
This patient has been assessed with a concern for Malnutrition and has been determined to have a diagnosis/diagnoses of Severe protein-calorie malnutrition and Underweight (BMI < 19).    This patient is being managed with:   Diet NPO after Midnight-     NPO Start Date: 01-Mar-2023   NPO Start Time: 23:59  Except Medications  Entered: Mar  1 2023  3:38AM    Parenteral Nutrition - Adult-  Entered: Feb 28 2023  5:00PM    fat emulsion (Fish Oil and Plant Based) 20% Infusion-[Known as SMOFLIPID 20% Infusion]  50 Gram(s) in IV Solution 250 milliLiter(s) infuse at 20.83 mL/Hr  Dose Rate: 0.7764 Gm/kG/Day Infuse Over: 12 Hours; Stop After 12 Hours  Administration Instructions: Use 1.2 micron in-line filter  Entered: Feb 28 2023  5:00PM    Diet Regular-  Supplement Feeding Modality:  Oral  Ensure Max Cans or Servings Per Day:  1       Frequency:  Three Times a day  Entered: Feb 20 2023  8:30AM    
This patient has been assessed with a concern for Malnutrition and has been determined to have a diagnosis/diagnoses of Severe protein-calorie malnutrition and Underweight (BMI < 19).    This patient is being managed with:   Diet NPO after Midnight-     NPO Start Date: 06-Mar-2023   NPO Start Time: 23:59  Except Medications  Entered: Mar  6 2023  6:30PM    Parenteral Nutrition - Adult-  Entered: Mar  6 2023  5:00PM    fat emulsion (Fish Oil and Plant Based) 20% Infusion-[Known as SMOFLIPID 20% Infusion]  50 Gram(s) in IV Solution 250 milliLiter(s) infuse at 20.83 mL/Hr  Dose Rate: 0.7764 Gm/kG/Day Infuse Over: 12 Hours; Stop After 12 Hours  Administration Instructions: Use 1.2 micron in-line filter  Entered: Mar  6 2023  5:00PM    Diet Regular-  Supplement Feeding Modality:  Oral  Ensure Max Cans or Servings Per Day:  1       Frequency:  Three Times a day  Entered: Feb 20 2023  8:30AM    
This patient has been assessed with a concern for Malnutrition and has been determined to have a diagnosis/diagnoses of Severe protein-calorie malnutrition and Underweight (BMI < 19).    This patient is being managed with:   Diet NPO after Midnight-     NPO Start Date: 28-Dec-2022   NPO Start Time: 23:59  Entered: Dec 28 2022 12:11PM    Diet Regular-  High Fiber (HIFIBER)  Supplement Feeding Modality:  Oral  Ensure Max Cans or Servings Per Day:  1       Frequency:  Three Times a day  Entered: Dec 27 2022  3:21PM    
This patient has been assessed with a concern for Malnutrition and has been determined to have a diagnosis/diagnoses of Severe protein-calorie malnutrition and Underweight (BMI < 19).    This patient is being managed with:   Diet NPO-  Entered: Dec 11 2022  2:57AM    
This patient has been assessed with a concern for Malnutrition and has been determined to have a diagnosis/diagnoses of Severe protein-calorie malnutrition and Underweight (BMI < 19).    This patient is being managed with:   Diet NPO-  Entered: Dec 11 2022  2:57AM    
This patient has been assessed with a concern for Malnutrition and has been determined to have a diagnosis/diagnoses of Severe protein-calorie malnutrition and Underweight (BMI < 19).    This patient is being managed with:   Diet NPO-  Entered: Feb 10 2023  1:22PM    
This patient has been assessed with a concern for Malnutrition and has been determined to have a diagnosis/diagnoses of Severe protein-calorie malnutrition and Underweight (BMI < 19).    This patient is being managed with:   Diet NPO-  Except Medications  Entered: Ladarius 15 2023 10:43PM    Parenteral Nutrition - Adult-  Entered: Lad[place] 15 2023  5:00PM    fat emulsion (Fish Oil and Plant Based) 20% Infusion-[Known as SMOFLIPID 20% Infusion]  20 Gram(s) in IV Solution 100 milliLiter(s) infuse at 8.33 mL/Hr  Dose Rate: 0.31 Gm/kG/Day Infuse Over: 12 Hours; Stop After 12 Hours  Administration Instructions: Use 1.2 micron in-line filter  Entered: Ladarius 15 2023  5:00PM    
This patient has been assessed with a concern for Malnutrition and has been determined to have a diagnosis/diagnoses of Severe protein-calorie malnutrition and Underweight (BMI < 19).    This patient is being managed with:   Diet Regular-  High Fiber (HIFIBER)  Supplement Feeding Modality:  Oral  Ensure Max Cans or Servings Per Day:  1       Frequency:  Three Times a day  Entered: Dec 29 2022  8:47AM    
This patient has been assessed with a concern for Malnutrition and has been determined to have a diagnosis/diagnoses of Severe protein-calorie malnutrition and Underweight (BMI < 19).    This patient is being managed with:   Diet Regular-  Supplement Feeding Modality:  Oral  Ensure Surgery Cans or Servings Per Day:  1       Frequency:  Three Times a day  Entered: Feb 13 2023 12:52PM    
This patient has been assessed with a concern for Malnutrition and has been determined to have a diagnosis/diagnoses of Severe protein-calorie malnutrition and Underweight (BMI < 19).    This patient is being managed with:   Diet Regular-  Supplement Feeding Modality:  Oral  Ensure Surgery Cans or Servings Per Day:  1       Frequency:  Three Times a day  Entered: Feb 13 2023 12:52PM    
This patient has been assessed with a concern for Malnutrition and has been determined to have a diagnosis/diagnoses of Severe protein-calorie malnutrition and Underweight (BMI < 19).    This patient is being managed with:   Diet Soft and Bite Sized-  High Fiber (HIFIBER)  Supplement Feeding Modality:  Oral  Ensure Enlive Cans or Servings Per Day:  2       Frequency:  Three Times a day  Entered: Jan 6 2023 11:16AM    
This patient has been assessed with a concern for Malnutrition and has been determined to have a diagnosis/diagnoses of Severe protein-calorie malnutrition and Underweight (BMI < 19).    This patient is being managed with:   Parenteral Nutrition - Adult-  Entered: Feb 2 2023  5:00PM    fat emulsion (Fish Oil and Plant Based) 20% Infusion-[Known as SMOFLIPID 20% Infusion]  50 Gram(s) in IV Solution 250 milliLiter(s) infuse at 20.8 mL/Hr  Dose Rate: 0.7764 Gm/kG/Day Infuse Over: 12 Hours; Stop After 12 Hours  Administration Instructions: Use 1.2 micron in-line filter  Entered: Feb 2 2023  5:00PM    Diet Regular-    Start Time: 19:00  Supplement Feeding Modality:  Oral  Ensure Enlive Cans or Servings Per Day:  1       Frequency:  Three Times a day  Entered: Jan 20 2023  6:53AM    
This patient has been assessed with a concern for Malnutrition and has been determined to have a diagnosis/diagnoses of Severe protein-calorie malnutrition and Underweight (BMI < 19).    This patient is being managed with:   Parenteral Nutrition - Adult-  Entered: Feb 4 2023  5:00PM    fat emulsion (Fish Oil and Plant Based) 20% Infusion-[Known as SMOFLIPID 20% Infusion]  50 Gram(s) in IV Solution 250 milliLiter(s) infuse at 20.83 mL/Hr  Dose Rate: 0.776 Gm/kG/Day Infuse Over: 12 Hours; Stop After 12 Hours  Administration Instructions: Use 1.2 micron in-line filter  Entered: Feb 4 2023  5:00PM    Diet Regular-    Start Time: 19:00  Supplement Feeding Modality:  Oral  Ensure Enlive Cans or Servings Per Day:  1       Frequency:  Three Times a day  Entered: Jan 20 2023  6:53AM    
This patient has been assessed with a concern for Malnutrition and has been determined to have a diagnosis/diagnoses of Severe protein-calorie malnutrition and Underweight (BMI < 19).    This patient is being managed with:   Parenteral Nutrition - Adult-  Entered: Jan 11 2023  5:00PM    fat emulsion (Fish Oil and Plant Based) 20% Infusion-[Known as SMOFLIPID 20% Infusion]  20 Gram(s) in IV Solution 100 milliLiter(s) infuse at 8.33 mL/Hr  Dose Rate: 0.31 Gm/kG/Day Infuse Over: 12 Hours; Stop After 12 Hours  Administration Instructions: Use 1.2 micron in-line filter  Entered: Jan 11 2023  5:00PM    Diet Soft and Bite Sized-  Fiber/Residue Restricted (LOWFIBER)  Supplement Feeding Modality:  Oral  Ensure Enlive Cans or Servings Per Day:  2       Frequency:  Three Times a day  Entered: Jan 11 2023 12:21PM    Parenteral Nutrition - Adult-  Entered: Lad[place] 10 2023  5:00PM    
This patient has been assessed with a concern for Malnutrition and has been determined to have a diagnosis/diagnoses of Severe protein-calorie malnutrition and Underweight (BMI < 19).    This patient is being managed with:   Parenteral Nutrition - Adult-  Entered: Jan 12 2023  5:00PM    Diet Soft and Bite Sized-  Fiber/Residue Restricted (LOWFIBER)  Supplement Feeding Modality:  Oral  Ensure Enlive Cans or Servings Per Day:  2       Frequency:  Three Times a day  Entered: Jan 11 2023 12:21PM    
This patient has been assessed with a concern for Malnutrition and has been determined to have a diagnosis/diagnoses of Severe protein-calorie malnutrition and Underweight (BMI < 19).    This patient is being managed with:   Parenteral Nutrition - Adult-  Entered: Jan 12 2023  5:00PM    fat emulsion (Fish Oil and Plant Based) 20% Infusion-[Known as SMOFLIPID 20% Infusion]  50 Gram(s) in IV Solution 250 milliLiter(s) infuse at 20.8 mL/Hr  Dose Rate: 0.7764 Gm/kG/Day Infuse Over: 12 Hours; Stop After 12 Hours  Administration Instructions: Use 1.2 micron in-line filter  Entered: Jan 12 2023  5:00PM    Parenteral Nutrition - Adult-  Entered: Jan 11 2023  5:00PM    Diet Soft and Bite Sized-  Fiber/Residue Restricted (LOWFIBER)  Supplement Feeding Modality:  Oral  Ensure Enlive Cans or Servings Per Day:  2       Frequency:  Three Times a day  Entered: Jan 11 2023 12:21PM    
This patient has been assessed with a concern for Malnutrition and has been determined to have a diagnosis/diagnoses of Severe protein-calorie malnutrition and Underweight (BMI < 19).    This patient is being managed with:   Parenteral Nutrition - Adult-  Entered: Jan 14 2023  5:00PM    fat emulsion (Fish Oil and Plant Based) 20% Infusion-[Known as SMOFLIPID 20% Infusion]  50 Gram(s) in IV Solution 250 milliLiter(s) infuse at 20.83 mL/Hr  Dose Rate: 0.7764 Gm/kG/Day Infuse Over: 12 Hours; Stop After 12 Hours  Administration Instructions: Use 1.2 micron in-line filter  Entered: Jan 14 2023  5:00PM    Parenteral Nutrition - Adult-  Entered: Jan 13 2023  5:00PM    Diet Soft and Bite Sized-  Fiber/Residue Restricted (LOWFIBER)  Supplement Feeding Modality:  Oral  Ensure Enlive Cans or Servings Per Day:  2       Frequency:  Three Times a day  Entered: Jan 11 2023 12:21PM    
This patient has been assessed with a concern for Malnutrition and has been determined to have a diagnosis/diagnoses of Severe protein-calorie malnutrition and Underweight (BMI < 19).    This patient is being managed with:   Parenteral Nutrition - Adult-  Entered: Jan 14 2023  5:00PM    fat emulsion (Fish Oil and Plant Based) 20% Infusion-[Known as SMOFLIPID 20% Infusion]  50 Gram(s) in IV Solution 250 milliLiter(s) infuse at 20.83 mL/Hr  Dose Rate: 0.7764 Gm/kG/Day Infuse Over: 12 Hours; Stop After 12 Hours  Administration Instructions: Use 1.2 micron in-line filter  Entered: Jan 14 2023  5:00PM    Parenteral Nutrition - Adult-  Entered: Jan 13 2023  5:00PM    Diet Soft and Bite Sized-  Fiber/Residue Restricted (LOWFIBER)  Supplement Feeding Modality:  Oral  Ensure Enlive Cans or Servings Per Day:  2       Frequency:  Three Times a day  Entered: Jan 11 2023 12:21PM    
This patient has been assessed with a concern for Malnutrition and has been determined to have a diagnosis/diagnoses of Severe protein-calorie malnutrition and Underweight (BMI < 19).    This patient is being managed with:   Parenteral Nutrition - Adult-  Entered: Jan 17 2023  5:00PM    fat emulsion (Fish Oil and Plant Based) 20% Infusion-[Known as SMOFLIPID 20% Infusion]  20 Gram(s) in IV Solution 100 milliLiter(s) infuse at 8.33 mL/Hr  Dose Rate: 0.31 Gm/kG/Day Infuse Over: 12 Hours; Stop After 12 Hours  Administration Instructions: Use 1.2 micron in-line filter  Entered: Jan 17 2023  5:00PM    Diet Full Liquid-  Supplement Feeding Modality:  Oral  Ensure Max Cans or Servings Per Day:  1       Frequency:  Three Times a day  Entered: Jan 17 2023  3:19PM    
This patient has been assessed with a concern for Malnutrition and has been determined to have a diagnosis/diagnoses of Severe protein-calorie malnutrition and Underweight (BMI < 19).    This patient is being managed with:   Parenteral Nutrition - Adult-  Entered: Jan 29 2023  5:00PM    fat emulsion (Fish Oil and Plant Based) 20% Infusion-[Known as SMOFLIPID 20% Infusion]  49.85 Gram(s) in IV Solution 249.25 milliLiter(s) infuse at 20.8 mL/Hr  Dose Rate: 0.774 Gm/kG/Day Infuse Over: 12 Hours; Stop After 12 Hours  Administration Instructions: Use 1.2 micron in-line filter  Entered: Jan 29 2023  5:00PM    Parenteral Nutrition - Adult-  Entered: Jan 28 2023  5:00PM    fat emulsion (Fish Oil and Plant Based) 20% Infusion-[Known as SMOFLIPID 20% Infusion]  50 Gram(s) in IV Solution 250 milliLiter(s) infuse at 20.83 mL/Hr  Dose Rate: 0.774 Gm/kG/Day Infuse Over: 12 Hours; Stop After 12 Hours  Administration Instructions: Use 1.2 micron in-line filter  Entered: Jan 28 2023  5:00PM    Diet Regular-    Start Time: 19:00  Supplement Feeding Modality:  Oral  Ensure Enlive Cans or Servings Per Day:  1       Frequency:  Three Times a day  Entered: Jan 20 2023  6:53AM    
This patient has been assessed with a concern for Malnutrition and has been determined to have a diagnosis/diagnoses of Severe protein-calorie malnutrition and Underweight (BMI < 19).    This patient is being managed with:   Parenteral Nutrition - Adult-  Entered: Jan 29 2023  5:00PM    fat emulsion (Fish Oil and Plant Based) 20% Infusion-[Known as SMOFLIPID 20% Infusion]  50 Gram(s) in IV Solution 250 milliLiter(s) infuse at 20.83 mL/Hr  Dose Rate: 0.774 Gm/kG/Day Infuse Over: 12 Hours; Stop After 12 Hours  Administration Instructions: Use 1.2 micron in-line filter  Entered: Jan 29 2023  5:00PM    Diet Regular-    Start Time: 19:00  Supplement Feeding Modality:  Oral  Ensure Enlive Cans or Servings Per Day:  1       Frequency:  Three Times a day  Entered: Jan 20 2023  6:53AM    
This patient has been assessed with a concern for Malnutrition and has been determined to have a diagnosis/diagnoses of Severe protein-calorie malnutrition and Underweight (BMI < 19).    This patient is being managed with:   Parenteral Nutrition - Adult-  Entered: Ladarius 10 2023  5:00PM    fat emulsion (Fish Oil and Plant Based) 20% Infusion-[Known as SMOFLIPID 20% Infusion]  20 Gram(s) in IV Solution 100 milliLiter(s) infuse at 8.33 mL/Hr  Dose Rate: 0.31 Gm/kG/Day Infuse Over: 12 Hours; Stop After 12 Hours  Administration Instructions: Use 1.2 micron in-line filter  Entered: Ladarius 10 2023  5:00PM    Diet Soft and Bite Sized-  High Fiber (HIFIBER)  Supplement Feeding Modality:  Oral  Ensure Enlive Cans or Servings Per Day:  2       Frequency:  Three Times a day  Entered: Jan 6 2023 11:16AM    
This patient has been assessed with a concern for Malnutrition and has been determined to have a diagnosis/diagnoses of Severe protein-calorie malnutrition and Underweight (BMI < 19).    This patient is being managed with:   Parenteral Nutrition - Adult-  Entered: Mar  1 2023  5:00PM    Diet Regular-  Supplement Feeding Modality:  Oral  Ensure Max Cans or Servings Per Day:  1       Frequency:  Three Times a day  Entered: Feb 20 2023  8:30AM    
This patient has been assessed with a concern for Malnutrition and has been determined to have a diagnosis/diagnoses of Severe protein-calorie malnutrition and Underweight (BMI < 19).    This patient is being managed with:   Parenteral Nutrition - Adult-  Entered: Mar  1 2023  5:00PM    fat emulsion (Fish Oil and Plant Based) 20% Infusion-[Known as SMOFLIPID 20% Infusion]  50 Gram(s) in IV Solution 250 milliLiter(s) infuse at 20.83 mL/Hr  Dose Rate: 0.7764 Gm/kG/Day Infuse Over: 12 Hours; Stop After 12 Hours  Administration Instructions: Use 1.2 micron in-line filter  Entered: Mar  1 2023  5:00AM    Diet NPO after Midnight-     NPO Start Date: 01-Mar-2023   NPO Start Time: 23:59  Except Medications  Entered: Mar  1 2023  3:38AM    Parenteral Nutrition - Adult-  Entered: Feb 28 2023  5:00PM    Diet Regular-  Supplement Feeding Modality:  Oral  Ensure Max Cans or Servings Per Day:  1       Frequency:  Three Times a day  Entered: Feb 20 2023  8:30AM    
This patient has been assessed with a concern for Malnutrition and has been determined to have a diagnosis/diagnoses of Severe protein-calorie malnutrition and Underweight (BMI < 19).    This patient is being managed with:   Parenteral Nutrition - Adult-  Entered: Mar 10 2023  5:00PM    fat emulsion (Fish Oil and Plant Based) 20% Infusion-[Known as SMOFLIPID 20% Infusion]  50 Gram(s) in IV Solution 250 milliLiter(s) infuse at 20.83 mL/Hr  Dose Rate: 0.7764 Gm/kG/Day Infuse Over: 12 Hours; Stop After 12 Hours  Administration Instructions: Use 1.2 micron in-line filter  Entered: Mar 10 2023  5:00PM    Parenteral Nutrition - Adult-  Entered: Mar  9 2023  5:00PM    Diet NPO-  Except Medications  Entered: Mar  8 2023  4:19PM    
This patient has been assessed with a concern for Malnutrition and has been determined to have a diagnosis/diagnoses of Severe protein-calorie malnutrition and Underweight (BMI < 19).    This patient is being managed with:   Parenteral Nutrition - Adult-  Entered: Mar 14 2023  5:00PM    fat emulsion (Fish Oil and Plant Based) 20% Infusion-[Known as SMOFLIPID 20% Infusion]  50 Gram(s) in IV Solution 250 milliLiter(s) infuse at 20.83 mL/Hr  Dose Rate: 0.7764 Gm/kG/Day Infuse Over: 12 Hours; Stop After 12 Hours  Administration Instructions: Use 1.2 micron in-line filter  Entered: Mar 14 2023  5:00PM    Parenteral Nutrition - Adult-  Entered: Mar 13 2023  5:00PM    Diet NPO-  Except Medications  Entered: Mar  8 2023  4:19PM    
This patient has been assessed with a concern for Malnutrition and has been determined to have a diagnosis/diagnoses of Severe protein-calorie malnutrition and Underweight (BMI < 19).    This patient is being managed with:   fat emulsion (Fish Oil and Plant Based) 20% Infusion-[Known as SMOFLIPID 20% Infusion]  50 Gram(s) in IV Solution 250 milliLiter(s) infuse at 20.83 mL/Hr  Dose Rate: 0.7764 Gm/kG/Day Infuse Over: 12 Hours; Stop After 12 Hours  Administration Instructions: Use 1.2 micron in-line filter  Entered: Feb 23 2023  5:00PM    Parenteral Nutrition - Adult-  Entered: Feb 23 2023  5:00PM    Parenteral Nutrition - Adult-  Entered: Feb 22 2023  5:00PM    Diet Regular-  Supplement Feeding Modality:  Oral  Ensure Max Cans or Servings Per Day:  1       Frequency:  Three Times a day  Entered: Feb 20 2023  8:30AM    
This patient has been assessed with a concern for Malnutrition and has been determined to have a diagnosis/diagnoses of Severe protein-calorie malnutrition and Underweight (BMI < 19).    This patient is being managed with:   Diet Clear Liquid-  Entered: Dec 19 2022 12:23PM    
This patient has been assessed with a concern for Malnutrition and has been determined to have a diagnosis/diagnoses of Severe protein-calorie malnutrition and Underweight (BMI < 19).    This patient is being managed with:   Diet Clear Liquid-  Entered: Dec 24 2022  9:09AM    Diet NPO-  NPO for Procedure/Test     NPO Start Date: 22-Dec-2022   NPO Start Time: 00:01  Entered: Dec 22 2022  8:42AM    
This patient has been assessed with a concern for Malnutrition and has been determined to have a diagnosis/diagnoses of Severe protein-calorie malnutrition and Underweight (BMI < 19).    This patient is being managed with:   Diet NPO after Midnight-     NPO Start Date: 14-Feb-2023   NPO Start Time: 23:59  Entered: Feb 14 2023  2:13PM    Diet Regular-  Supplement Feeding Modality:  Oral  Ensure Surgery Cans or Servings Per Day:  1       Frequency:  Three Times a day  Entered: Feb 13 2023 12:52PM    
This patient has been assessed with a concern for Malnutrition and has been determined to have a diagnosis/diagnoses of Severe protein-calorie malnutrition and Underweight (BMI < 19).    This patient is being managed with:   Diet NPO after Midnight-     NPO Start Date: 14-Feb-2023   NPO Start Time: 23:59  Entered: Feb 14 2023  2:13PM    Diet Regular-  Supplement Feeding Modality:  Oral  Ensure Surgery Cans or Servings Per Day:  1       Frequency:  Three Times a day  Entered: Feb 13 2023 12:52PM    
This patient has been assessed with a concern for Malnutrition and has been determined to have a diagnosis/diagnoses of Severe protein-calorie malnutrition and Underweight (BMI < 19).    This patient is being managed with:   Diet NPO after Midnight-     NPO Start Date: 21-Dec-2022   NPO Start Time: 23:59  Entered: Dec 21 2022  1:53PM    Diet Low Fiber-  Entered: Dec 21 2022 10:49AM    
This patient has been assessed with a concern for Malnutrition and has been determined to have a diagnosis/diagnoses of Severe protein-calorie malnutrition and Underweight (BMI < 19).    This patient is being managed with:   Diet NPO after Midnight-     NPO Start Date: 28-Dec-2022   NPO Start Time: 23:59  Entered: Dec 28 2022 12:11PM    
This patient has been assessed with a concern for Malnutrition and has been determined to have a diagnosis/diagnoses of Severe protein-calorie malnutrition and Underweight (BMI < 19).    This patient is being managed with:   Diet NPO after Midnight-     NPO Start Date: 28-Dec-2022   NPO Start Time: 23:59  Entered: Dec 28 2022 12:11PM    
This patient has been assessed with a concern for Malnutrition and has been determined to have a diagnosis/diagnoses of Severe protein-calorie malnutrition and Underweight (BMI < 19).    This patient is being managed with:   Diet NPO after Midnight-     NPO Start Date: 29-Dec-2022   NPO Start Time: 23:59  Entered: Dec 29 2022  6:12PM    Diet Regular-  High Fiber (HIFIBER)  Supplement Feeding Modality:  Oral  Ensure Max Cans or Servings Per Day:  1       Frequency:  Three Times a day  Entered: Dec 29 2022  8:47AM    
This patient has been assessed with a concern for Malnutrition and has been determined to have a diagnosis/diagnoses of Severe protein-calorie malnutrition and Underweight (BMI < 19).    This patient is being managed with:   Diet NPO-  Entered: Apr 1 2023  7:18PM    Parenteral Nutrition - Adult-  Entered: Apr 1 2023  5:00PM  
This patient has been assessed with a concern for Malnutrition and has been determined to have a diagnosis/diagnoses of Severe protein-calorie malnutrition and Underweight (BMI < 19).    This patient is being managed with:   Diet NPO-  Entered: Feb 10 2023  1:22PM    
This patient has been assessed with a concern for Malnutrition and has been determined to have a diagnosis/diagnoses of Severe protein-calorie malnutrition and Underweight (BMI < 19).    This patient is being managed with:   Diet NPO-  Entered: Feb 10 2023  1:22PM    
This patient has been assessed with a concern for Malnutrition and has been determined to have a diagnosis/diagnoses of Severe protein-calorie malnutrition and Underweight (BMI < 19).    This patient is being managed with:   Diet NPO-  NPO for Procedure/Test     NPO Start Date: 22-Dec-2022   NPO Start Time: 00:01  Entered: Dec 22 2022  8:42AM    
This patient has been assessed with a concern for Malnutrition and has been determined to have a diagnosis/diagnoses of Severe protein-calorie malnutrition and Underweight (BMI < 19).    This patient is being managed with:   Diet Regular-    Start Time: 19:00  Supplement Feeding Modality:  Oral  Ensure Enlive Cans or Servings Per Day:  1       Frequency:  Three Times a day  Entered: Jan 20 2023  6:53AM    
This patient has been assessed with a concern for Malnutrition and has been determined to have a diagnosis/diagnoses of Severe protein-calorie malnutrition and Underweight (BMI < 19).    This patient is being managed with:   Diet Regular-    Start Time: 19:00  Supplement Feeding Modality:  Oral  Ensure Enlive Cans or Servings Per Day:  1       Frequency:  Three Times a day  Entered: Jan 20 2023  6:53AM    Parenteral Nutrition - Adult-  Entered: Jan 19 2023  5:00PM    fat emulsion (Fish Oil and Plant Based) 20% Infusion-[Known as SMOFLIPID 20% Infusion]  20 Gram(s) in IV Solution 100 milliLiter(s) infuse at 8.33 mL/Hr  Dose Rate: 0.3106 Gm/kG/Day Infuse Over: 12 Hours; Stop After 12 Hours  Administration Instructions: Use 1.2 micron in-line filter  Entered: Jan 19 2023  5:00PM    
This patient has been assessed with a concern for Malnutrition and has been determined to have a diagnosis/diagnoses of Severe protein-calorie malnutrition and Underweight (BMI < 19).    This patient is being managed with:   Diet Regular-  Entered: Apr  3 2023 11:34AM  
This patient has been assessed with a concern for Malnutrition and has been determined to have a diagnosis/diagnoses of Severe protein-calorie malnutrition and Underweight (BMI < 19).    This patient is being managed with:   Diet Regular-  High Fiber (HIFIBER)  Tube Feeding Modality: Nasogastric  Jevity 1.2 Kip (JEVITY1.2RTH)  Total Volume for 24 Hours (mL): 240  Total Number of Cans: 1  Continuous  Starting Tube Feed Rate {mL per Hour}: 10  Until Goal Tube Feed Rate (mL per Hour): 10  Tube Feed Duration (in Hours): 24  Tube Feed Start Time: 08:00  Supplement Feeding Modality:  Oral  Ensure Max Cans or Servings Per Day:  1       Frequency:  Three Times a day  Entered: Jan 2 2023  9:40AM    
This patient has been assessed with a concern for Malnutrition and has been determined to have a diagnosis/diagnoses of Severe protein-calorie malnutrition and Underweight (BMI < 19).    This patient is being managed with:   Diet Regular-  High Fiber (HIFIBER)  Tube Feeding Modality: Nasogastric  Vital 1 Kip (VITAL1)  Total Volume for 24 Hours (mL): 1080  Total Number of Cans: 5  Continuous  Starting Tube Feed Rate {mL per Hour}: 10  Increase Tube Feed Rate by (mL): 10     Every 3 hours  Until Goal Tube Feed Rate (mL per Hour): 45  Tube Feed Duration (in Hours): 24  Tube Feed Start Time: 08:00  Supplement Feeding Modality:  Oral  Ensure Max Cans or Servings Per Day:  1       Frequency:  Three Times a day  Entered: [place]  3 2023  5:04PM    
This patient has been assessed with a concern for Malnutrition and has been determined to have a diagnosis/diagnoses of Severe protein-calorie malnutrition and Underweight (BMI < 19).    This patient is being managed with:   Diet Regular-  High Fiber (HIFIBER)  Tube Feeding Modality: Nasogastric  Vital 1 Kip (VITAL1)  Total Volume for 24 Hours (mL): 1080  Total Number of Cans: 5  Continuous  Starting Tube Feed Rate {mL per Hour}: 10  Increase Tube Feed Rate by (mL): 10     Every 3 hours  Until Goal Tube Feed Rate (mL per Hour): 45  Tube Feed Duration (in Hours): 24  Tube Feed Start Time: 08:00  Supplement Feeding Modality:  Oral  Ensure Max Cans or Servings Per Day:  1       Frequency:  Three Times a day  Entered: [place]  3 2023  5:04PM    
This patient has been assessed with a concern for Malnutrition and has been determined to have a diagnosis/diagnoses of Severe protein-calorie malnutrition and Underweight (BMI < 19).    This patient is being managed with:   Diet Regular-  Supplement Feeding Modality:  Oral  Ensure Surgery Cans or Servings Per Day:  1       Frequency:  Three Times a day  Entered: Feb 13 2023 12:52PM    
This patient has been assessed with a concern for Malnutrition and has been determined to have a diagnosis/diagnoses of Severe protein-calorie malnutrition and Underweight (BMI < 19).    This patient is being managed with:   Diet Regular-  Supplement Feeding Modality:  Oral  Ensure Surgery Cans or Servings Per Day:  1       Frequency:  Three Times a day  Entered: Feb 13 2023 12:52PM    
This patient has been assessed with a concern for Malnutrition and has been determined to have a diagnosis/diagnoses of Severe protein-calorie malnutrition and Underweight (BMI < 19).    This patient is being managed with:   Diet Soft and Bite Sized-  High Fiber (HIFIBER)  Supplement Feeding Modality:  Oral  Ensure Enlive Cans or Servings Per Day:  2       Frequency:  Three Times a day  Entered: Jan 6 2023 11:16AM    
This patient has been assessed with a concern for Malnutrition and has been determined to have a diagnosis/diagnoses of Severe protein-calorie malnutrition and Underweight (BMI < 19).    This patient is being managed with:   Diet Soft and Bite Sized-  High Fiber (HIFIBER)  Supplement Feeding Modality:  Oral  Ensure Enlive Cans or Servings Per Day:  2       Frequency:  Three Times a day  Entered: Jan 6 2023 11:16AM    
This patient has been assessed with a concern for Malnutrition and has been determined to have a diagnosis/diagnoses of Severe protein-calorie malnutrition and Underweight (BMI < 19).    This patient is being managed with:   Parenteral Nutrition - Adult-  Entered: Feb  3 2023  5:00PM    fat emulsion (Fish Oil and Plant Based) 20% Infusion-[Known as SMOFLIPID 20% Infusion]  50 Gram(s) in IV Solution 250 milliLiter(s) infuse at 20.83 mL/Hr  Dose Rate: 0.7764 Gm/kG/Day Infuse Over: 12 Hours; Stop After 12 Hours  Administration Instructions: Use 1.2 micron in-line filter  Entered: Feb  3 2023  5:00PM    Diet Regular-    Start Time: 19:00  Supplement Feeding Modality:  Oral  Ensure Enlive Cans or Servings Per Day:  1       Frequency:  Three Times a day  Entered: Jan 20 2023  6:53AM    
This patient has been assessed with a concern for Malnutrition and has been determined to have a diagnosis/diagnoses of Severe protein-calorie malnutrition and Underweight (BMI < 19).    This patient is being managed with:   Parenteral Nutrition - Adult-  Entered: Feb 9 2023  5:00PM    fat emulsion (Fish Oil and Plant Based) 20% Infusion-[Known as SMOFLIPID 20% Infusion]  50 Gram(s) in IV Solution 250 milliLiter(s) infuse at 20.8 mL/Hr  Dose Rate: 0.7764 Gm/kG/Day Infuse Over: 12 Hours; Stop After 12 Hours  Administration Instructions: Use 1.2 micron in-line filter  Entered: Feb 9 2023  5:00PM    Diet Regular-    Start Time: 19:00  Supplement Feeding Modality:  Oral  Ensure Enlive Cans or Servings Per Day:  1       Frequency:  Three Times a day  Entered: Jan 20 2023  6:53AM    
This patient has been assessed with a concern for Malnutrition and has been determined to have a diagnosis/diagnoses of Severe protein-calorie malnutrition and Underweight (BMI < 19).    This patient is being managed with:   Parenteral Nutrition - Adult-  Entered: Jan 13 2023  5:00PM    fat emulsion (Fish Oil and Plant Based) 20% Infusion-[Known as SMOFLIPID 20% Infusion]  50 Gram(s) in IV Solution 250 milliLiter(s) infuse at 20.83 mL/Hr  Dose Rate: 0.7764 Gm/kG/Day Infuse Over: 12 Hours; Stop After 12 Hours  Administration Instructions: Use 1.2 micron in-line filter  Entered: Jan 13 2023  5:00PM    Parenteral Nutrition - Adult-  Entered: Jan 12 2023  5:00PM    Diet Soft and Bite Sized-  Fiber/Residue Restricted (LOWFIBER)  Supplement Feeding Modality:  Oral  Ensure Enlive Cans or Servings Per Day:  2       Frequency:  Three Times a day  Entered: Jan 11 2023 12:21PM    
This patient has been assessed with a concern for Malnutrition and has been determined to have a diagnosis/diagnoses of Severe protein-calorie malnutrition and Underweight (BMI < 19).    This patient is being managed with:   Parenteral Nutrition - Adult-  Entered: Jan 16 2023  5:00PM    fat emulsion (Fish Oil and Plant Based) 20% Infusion-[Known as SMOFLIPID 20% Infusion]  20 Gram(s) in IV Solution 100 milliLiter(s) infuse at 8.32 mL/Hr  Dose Rate: 0.31 Gm/kG/Day Infuse Over: 12 Hours; Stop After 12 Hours  Administration Instructions: Use 1.2 micron in-line filter  Entered: Jan 16 2023  5:00PM    Diet NPO-  Except Medications  Entered: [place] 15 2023 10:43PM    Parenteral Nutrition - Adult-  Entered: [place] 15 2023  5:00PM    
This patient has been assessed with a concern for Malnutrition and has been determined to have a diagnosis/diagnoses of Severe protein-calorie malnutrition and Underweight (BMI < 19).    This patient is being managed with:   Parenteral Nutrition - Adult-  Entered: Jan 26 2023  5:00PM    fat emulsion (Fish Oil and Plant Based) 20% Infusion-[Known as SMOFLIPID 20% Infusion]  50 Gram(s) in IV Solution 250 milliLiter(s) infuse at 20.8 mL/Hr  Dose Rate: 0.7764 Gm/kG/Day Infuse Over: 12 Hours; Stop After 12 Hours  Administration Instructions: Use 1.2 micron in-line filter  Entered: Jan 26 2023  5:00PM    Diet Regular-    Start Time: 19:00  Supplement Feeding Modality:  Oral  Ensure Enlive Cans or Servings Per Day:  1       Frequency:  Three Times a day  Entered: Jan 20 2023  6:53AM    
This patient has been assessed with a concern for Malnutrition and has been determined to have a diagnosis/diagnoses of Severe protein-calorie malnutrition and Underweight (BMI < 19).    This patient is being managed with:   Parenteral Nutrition - Adult-  Entered: Jan 29 2023  5:00PM    fat emulsion (Fish Oil and Plant Based) 20% Infusion-[Known as SMOFLIPID 20% Infusion]  49.85 Gram(s) in IV Solution 249.25 milliLiter(s) infuse at 20.8 mL/Hr  Dose Rate: 0.774 Gm/kG/Day Infuse Over: 12 Hours; Stop After 12 Hours  Administration Instructions: Use 1.2 micron in-line filter  Entered: Jan 29 2023  5:00PM    Parenteral Nutrition - Adult-  Entered: Jan 28 2023  5:00PM    fat emulsion (Fish Oil and Plant Based) 20% Infusion-[Known as SMOFLIPID 20% Infusion]  50 Gram(s) in IV Solution 250 milliLiter(s) infuse at 20.83 mL/Hr  Dose Rate: 0.774 Gm/kG/Day Infuse Over: 12 Hours; Stop After 12 Hours  Administration Instructions: Use 1.2 micron in-line filter  Entered: Jan 28 2023  5:00PM    Diet Regular-    Start Time: 19:00  Supplement Feeding Modality:  Oral  Ensure Enlive Cans or Servings Per Day:  1       Frequency:  Three Times a day  Entered: Jan 20 2023  6:53AM    
This patient has been assessed with a concern for Malnutrition and has been determined to have a diagnosis/diagnoses of Severe protein-calorie malnutrition and Underweight (BMI < 19).    This patient is being managed with:   Parenteral Nutrition - Adult-  Entered: Mar  8 2023  5:00PM    fat emulsion (Fish Oil and Plant Based) 20% Infusion-[Known as SMOFLIPID 20% Infusion]  50 Gram(s) in IV Solution 250 milliLiter(s) infuse at 20.8 mL/Hr  Dose Rate: 0.7764 Gm/kG/Day Infuse Over: 12 Hours; Stop After 12 Hours  Administration Instructions: Use 1.2 micron in-line filter  Entered: Mar  8 2023  5:00PM    Parenteral Nutrition - Adult-  Entered: Mar  7 2023  5:00PM    Diet NPO-  Entered: Mar  7 2023  4:50PM    
This patient has been assessed with a concern for Malnutrition and has been determined to have a diagnosis/diagnoses of Severe protein-calorie malnutrition and Underweight (BMI < 19).    This patient is being managed with:   Parenteral Nutrition - Adult-  Entered: Mar  9 2023  5:00PM    Diet NPO-  Except Medications  Entered: Mar  8 2023  4:19PM    
This patient has been assessed with a concern for Malnutrition and has been determined to have a diagnosis/diagnoses of Severe protein-calorie malnutrition and Underweight (BMI < 19).    This patient is being managed with:   Parenteral Nutrition - Adult-  Entered: Mar 11 2023  5:00PM    fat emulsion (Fish Oil and Plant Based) 20% Infusion-[Known as SMOFLIPID 20% Infusion]  50 Gram(s) in IV Solution 250 milliLiter(s) infuse at 20.83 mL/Hr  Dose Rate: 0.7764 Gm/kG/Day Infuse Over: 12 Hours; Stop After 12 Hours  Administration Instructions: Use 1.2 micron in-line filter  Entered: Mar 11 2023  5:00PM    Diet NPO-  Except Medications  Entered: Mar  8 2023  4:19PM    
This patient has been assessed with a concern for Malnutrition and has been determined to have a diagnosis/diagnoses of Severe protein-calorie malnutrition and Underweight (BMI < 19).    This patient is being managed with:   Parenteral Nutrition - Adult-  Entered: Mar 13 2023  5:00PM    fat emulsion (Fish Oil and Plant Based) 20% Infusion-[Known as SMOFLIPID 20% Infusion]  50 Gram(s) in IV Solution 250 milliLiter(s) infuse at 20.83 mL/Hr  Dose Rate: 0.7764 Gm/kG/Day Infuse Over: 12 Hours; Stop After 12 Hours  Administration Instructions: Use 1.2 micron in-line filter  Entered: Mar 13 2023  5:00PM    Diet NPO-  Except Medications  Entered: Mar  8 2023  4:19PM    
This patient has been assessed with a concern for Malnutrition and has been determined to have a diagnosis/diagnoses of Severe protein-calorie malnutrition and Underweight (BMI < 19).    This patient is being managed with:   Parenteral Nutrition - Adult-  Entered: Mar 13 2023  5:00PM    fat emulsion (Fish Oil and Plant Based) 20% Infusion-[Known as SMOFLIPID 20% Infusion]  50 Gram(s) in IV Solution 250 milliLiter(s) infuse at 20.83 mL/Hr  Dose Rate: 0.7764 Gm/kG/Day Infuse Over: 12 Hours; Stop After 12 Hours  Administration Instructions: Use 1.2 micron in-line filter  Entered: Mar 13 2023  5:00PM    Parenteral Nutrition - Adult-  Entered: Mar 12 2023  5:00PM    fat emulsion (Fish Oil and Plant Based) 20% Infusion-[Known as SMOFLIPID 20% Infusion]  50 Gram(s) in IV Solution 250 milliLiter(s) infuse at 20.83 mL/Hr  Dose Rate: 0.7764 Gm/kG/Day Infuse Over: 12 Hours; Stop After 12 Hours  Administration Instructions: Use 1.2 micron in-line filter  Entered: Mar 12 2023  5:00PM    Diet NPO-  Except Medications  Entered: Mar  8 2023  4:19PM    
This patient has been assessed with a concern for Malnutrition and has been determined to have a diagnosis/diagnoses of Severe protein-calorie malnutrition and Underweight (BMI < 19).    This patient is being managed with:   Parenteral Nutrition - Adult-  Entered: Mar 25 2023  5:00PM    Diet NPO-  Except Medications  Entered: Mar  8 2023  4:19PM  
This patient has been assessed with a concern for Malnutrition and has been determined to have a diagnosis/diagnoses of Severe protein-calorie malnutrition and Underweight (BMI < 19).    This patient is being managed with:   fat emulsion (Fish Oil and Plant Based) 20% Infusion-[Known as SMOFLIPID 20% Infusion]  50 Gram(s) in IV Solution 250 milliLiter(s) infuse at 20.8 mL/Hr  Dose Rate: 0.7764 Gm/kG/Day Infuse Over: 12 Hours; Stop After 12 Hours  Administration Instructions: Use 1.2 micron in-line filter  Entered: Mar 29 2023  5:00PM    Parenteral Nutrition - Adult-  Entered: Mar 29 2023  5:00PM    Parenteral Nutrition - Adult-  Entered: Mar 28 2023  5:00PM    Diet NPO-  Except Medications  Entered: Mar  8 2023  4:19PM  
This patient has been assessed with a concern for Malnutrition and has been determined to have a diagnosis/diagnoses of Severe protein-calorie malnutrition and Underweight (BMI < 19).    This patient is being managed with:   Diet Clear Liquid-  Entered: Dec 24 2022  9:09AM    Diet NPO-  NPO for Procedure/Test     NPO Start Date: 22-Dec-2022   NPO Start Time: 00:01  Entered: Dec 22 2022  8:42AM    
This patient has been assessed with a concern for Malnutrition and has been determined to have a diagnosis/diagnoses of Severe protein-calorie malnutrition and Underweight (BMI < 19).    This patient is being managed with:   Diet Clear Liquid-  Supplement Feeding Modality:  Oral  Ensure Clear Cans or Servings Per Day:  1       Frequency:  Three Times a day  Entered: Dec 27 2022  7:57AM    
This patient has been assessed with a concern for Malnutrition and has been determined to have a diagnosis/diagnoses of Severe protein-calorie malnutrition and Underweight (BMI < 19).    This patient is being managed with:   Diet Clear Liquid-  Supplement Feeding Modality:  Oral  Ensure Enlive Cans or Servings Per Day:  1       Frequency:  Three Times a day  Entered: Dec 25 2022  1:50PM    
This patient has been assessed with a concern for Malnutrition and has been determined to have a diagnosis/diagnoses of Severe protein-calorie malnutrition and Underweight (BMI < 19).    This patient is being managed with:   Diet NPO after Midnight-     NPO Start Date: 14-Feb-2023   NPO Start Time: 23:59  Entered: Feb 14 2023  2:13PM    Diet Regular-  Supplement Feeding Modality:  Oral  Ensure Surgery Cans or Servings Per Day:  1       Frequency:  Three Times a day  Entered: Feb 13 2023 12:52PM    
This patient has been assessed with a concern for Malnutrition and has been determined to have a diagnosis/diagnoses of Severe protein-calorie malnutrition and Underweight (BMI < 19).    This patient is being managed with:   Diet NPO after Midnight-     NPO Start Date: 21-Feb-2023   NPO Start Time: 23:59  Entered: Feb 21 2023  6:20PM    Parenteral Nutrition - Adult-  Entered: Feb 21 2023  5:00PM    fat emulsion (Fish Oil and Plant Based) 20% Infusion-[Known as SMOFLIPID 20% Infusion]  50 Gram(s) in IV Solution 250 milliLiter(s) infuse at 20.83 mL/Hr  Dose Rate: 0.7764 Gm/kG/Day Infuse Over: 12 Hours; Stop After 12 Hours  Administration Instructions: Use 1.2 micron in-line filter  Entered: Feb 21 2023  5:00PM    Diet Regular-  Supplement Feeding Modality:  Oral  Ensure Max Cans or Servings Per Day:  1       Frequency:  Three Times a day  Entered: Feb 20 2023  8:30AM    
This patient has been assessed with a concern for Malnutrition and has been determined to have a diagnosis/diagnoses of Severe protein-calorie malnutrition and Underweight (BMI < 19).    This patient is being managed with:   Diet NPO after Midnight-     NPO Start Date: 28-Dec-2022   NPO Start Time: 23:59  Entered: Dec 28 2022 12:11PM    Diet Regular-  High Fiber (HIFIBER)  Supplement Feeding Modality:  Oral  Ensure Max Cans or Servings Per Day:  1       Frequency:  Three Times a day  Entered: Dec 27 2022  3:21PM    
This patient has been assessed with a concern for Malnutrition and has been determined to have a diagnosis/diagnoses of Severe protein-calorie malnutrition and Underweight (BMI < 19).    This patient is being managed with:   Diet NPO-  Entered: Apr 1 2023  7:18PM    Parenteral Nutrition - Adult-  Entered: Apr 1 2023  5:00PM    fat emulsion (Fish Oil and Plant Based) 20% Infusion-[Known as SMOFLIPID 20% Infusion]  50 Gram(s) in IV Solution 250 milliLiter(s) infuse at 20.8 mL/Hr  Dose Rate: 0.7764 Gm/kG/Day Infuse Over: 12 Hours; Stop After 12 Hours  Administration Instructions: Use 1.2 micron in-line filter  Entered: Apr 1 2023  5:00PM  
This patient has been assessed with a concern for Malnutrition and has been determined to have a diagnosis/diagnoses of Severe protein-calorie malnutrition and Underweight (BMI < 19).    This patient is being managed with:   Diet NPO-  Entered: Dec 11 2022  2:57AM    
This patient has been assessed with a concern for Malnutrition and has been determined to have a diagnosis/diagnoses of Severe protein-calorie malnutrition and Underweight (BMI < 19).    This patient is being managed with:   Diet NPO-  Entered: Feb 10 2023  1:22PM    
This patient has been assessed with a concern for Malnutrition and has been determined to have a diagnosis/diagnoses of Severe protein-calorie malnutrition and Underweight (BMI < 19).    This patient is being managed with:   Diet NPO-  Except Medications  Entered: Dec  8 2022  4:52PM    
This patient has been assessed with a concern for Malnutrition and has been determined to have a diagnosis/diagnoses of Severe protein-calorie malnutrition and Underweight (BMI < 19).    This patient is being managed with:   Diet NPO-  Except Medications  Entered: Dec 19 2022  6:15PM    
This patient has been assessed with a concern for Malnutrition and has been determined to have a diagnosis/diagnoses of Severe protein-calorie malnutrition and Underweight (BMI < 19).    This patient is being managed with:   Diet Regular-    Start Time: 19:00  Supplement Feeding Modality:  Oral  Ensure Enlive Cans or Servings Per Day:  1       Frequency:  Three Times a day  Entered: Jan 20 2023  6:53AM    
This patient has been assessed with a concern for Malnutrition and has been determined to have a diagnosis/diagnoses of Severe protein-calorie malnutrition and Underweight (BMI < 19).    This patient is being managed with:   Diet Regular-  High Fiber (HIFIBER)  Supplement Feeding Modality:  Oral  Ensure Max Cans or Servings Per Day:  1       Frequency:  Three Times a day  Entered: Dec 27 2022  3:21PM    
This patient has been assessed with a concern for Malnutrition and has been determined to have a diagnosis/diagnoses of Severe protein-calorie malnutrition and Underweight (BMI < 19).    This patient is being managed with:   Diet Regular-  High Fiber (HIFIBER)  Supplement Feeding Modality:  Oral  Ensure Max Cans or Servings Per Day:  1       Frequency:  Three Times a day  Entered: Dec 27 2022  3:21PM    
This patient has been assessed with a concern for Malnutrition and has been determined to have a diagnosis/diagnoses of Severe protein-calorie malnutrition and Underweight (BMI < 19).    This patient is being managed with:   Diet Regular-  High Fiber (HIFIBER)  Supplement Feeding Modality:  Oral  Ensure Max Cans or Servings Per Day:  1       Frequency:  Three Times a day  Entered: Dec 29 2022  8:47AM    
This patient has been assessed with a concern for Malnutrition and has been determined to have a diagnosis/diagnoses of Severe protein-calorie malnutrition and Underweight (BMI < 19).    This patient is being managed with:   Diet Regular-  High Fiber (HIFIBER)  Tube Feeding Modality: Nasogastric  Vital 1 Kip (VITAL1)  Total Volume for 24 Hours (mL): 1080  Total Number of Cans: 5  Continuous  Starting Tube Feed Rate {mL per Hour}: 10  Increase Tube Feed Rate by (mL): 10     Every 3 hours  Until Goal Tube Feed Rate (mL per Hour): 45  Tube Feed Duration (in Hours): 24  Tube Feed Start Time: 08:00  Supplement Feeding Modality:  Oral  Ensure Max Cans or Servings Per Day:  1       Frequency:  Three Times a day  Entered: [place]  3 2023  5:04PM    
This patient has been assessed with a concern for Malnutrition and has been determined to have a diagnosis/diagnoses of Severe protein-calorie malnutrition and Underweight (BMI < 19).    This patient is being managed with:   Diet Regular-  High Fiber (HIFIBER)  Tube Feeding Modality: Nasogastric  Vital 1 Kip (VITAL1)  Total Volume for 24 Hours (mL): 240  Total Number of Cans: 1  Continuous  Starting Tube Feed Rate {mL per Hour}: 10  Until Goal Tube Feed Rate (mL per Hour): 10  Tube Feed Duration (in Hours): 24  Tube Feed Start Time: 08:00  Supplement Feeding Modality:  Oral  Ensure Max Cans or Servings Per Day:  1       Frequency:  Three Times a day  Entered: Jan 2 2023  3:13AM    
This patient has been assessed with a concern for Malnutrition and has been determined to have a diagnosis/diagnoses of Severe protein-calorie malnutrition and Underweight (BMI < 19).    This patient is being managed with:   Diet Soft and Bite Sized-  High Fiber (HIFIBER)  Supplement Feeding Modality:  Oral  Ensure Enlive Cans or Servings Per Day:  2       Frequency:  Three Times a day  Entered: Jan 6 2023 11:16AM    
This patient has been assessed with a concern for Malnutrition and has been determined to have a diagnosis/diagnoses of Severe protein-calorie malnutrition and Underweight (BMI < 19).    This patient is being managed with:   Diet Soft and Bite Sized-  High Fiber (HIFIBER)  Supplement Feeding Modality:  Oral  Ensure Enlive Cans or Servings Per Day:  2       Frequency:  Three Times a day  Entered: Jan 6 2023 11:16AM    
This patient has been assessed with a concern for Malnutrition and has been determined to have a diagnosis/diagnoses of Severe protein-calorie malnutrition and Underweight (BMI < 19).    This patient is being managed with:   Parenteral Nutrition - Adult-  Entered: Feb 20 2023  5:00PM    fat emulsion (Fish Oil and Plant Based) 20% Infusion-[Known as SMOFLIPID 20% Infusion]  50 Gram(s) in IV Solution 250 milliLiter(s) infuse at 20.83 mL/Hr  Dose Rate: 0.777 Gm/kG/Day Infuse Over: 12 Hours; Stop After 12 Hours  Administration Instructions: Use 1.2 micron in-line filter  Entered: Feb 20 2023  5:00PM    Diet Regular-  Supplement Feeding Modality:  Oral  Ensure Max Cans or Servings Per Day:  1       Frequency:  Three Times a day  Entered: Feb 20 2023  8:30AM    
This patient has been assessed with a concern for Malnutrition and has been determined to have a diagnosis/diagnoses of Severe protein-calorie malnutrition and Underweight (BMI < 19).    This patient is being managed with:   Parenteral Nutrition - Adult-  Entered: Feb 25 2023  5:00PM    fat emulsion (Fish Oil and Plant Based) 20% Infusion-[Known as SMOFLIPID 20% Infusion]  50 Gram(s) in IV Solution 250 milliLiter(s) infuse at 20.83 mL/Hr  Dose Rate: 0.774 Gm/kG/Day Infuse Over: 12 Hours; Stop After 12 Hours  Administration Instructions: Use 1.2 micron in-line filter  Entered: Feb 25 2023  5:00PM    Parenteral Nutrition - Adult-  Entered: Feb 24 2023  5:00PM    Diet Regular-  Supplement Feeding Modality:  Oral  Ensure Max Cans or Servings Per Day:  1       Frequency:  Three Times a day  Entered: Feb 20 2023  8:30AM    
This patient has been assessed with a concern for Malnutrition and has been determined to have a diagnosis/diagnoses of Severe protein-calorie malnutrition and Underweight (BMI < 19).    This patient is being managed with:   Parenteral Nutrition - Adult-  Entered: Feb 26 2023  5:00PM    fat emulsion (Fish Oil and Plant Based) 20% Infusion-[Known as SMOFLIPID 20% Infusion]  50 Gram(s) in IV Solution 250 milliLiter(s) infuse at 20.83 mL/Hr  Dose Rate: 0.774 Gm/kG/Day Infuse Over: 12 Hours; Stop After 12 Hours  Administration Instructions: Use 1.2 micron in-line filter  Entered: Feb 26 2023  5:00PM    Diet Regular-  Supplement Feeding Modality:  Oral  Ensure Max Cans or Servings Per Day:  1       Frequency:  Three Times a day  Entered: Feb 20 2023  8:30AM    
This patient has been assessed with a concern for Malnutrition and has been determined to have a diagnosis/diagnoses of Severe protein-calorie malnutrition and Underweight (BMI < 19).    This patient is being managed with:   Parenteral Nutrition - Adult-  Entered: Jan 11 2023  5:00PM    fat emulsion (Fish Oil and Plant Based) 20% Infusion-[Known as SMOFLIPID 20% Infusion]  20 Gram(s) in IV Solution 100 milliLiter(s) infuse at 8.33 mL/Hr  Dose Rate: 0.31 Gm/kG/Day Infuse Over: 12 Hours; Stop After 12 Hours  Administration Instructions: Use 1.2 micron in-line filter  Entered: Jan 11 2023  5:00PM    Diet Soft and Bite Sized-  Fiber/Residue Restricted (LOWFIBER)  Supplement Feeding Modality:  Oral  Ensure Enlive Cans or Servings Per Day:  2       Frequency:  Three Times a day  Entered: Jan 11 2023 12:21PM    
This patient has been assessed with a concern for Malnutrition and has been determined to have a diagnosis/diagnoses of Severe protein-calorie malnutrition and Underweight (BMI < 19).    This patient is being managed with:   Parenteral Nutrition - Adult-  Entered: Jan 11 2023  5:00PM    fat emulsion (Fish Oil and Plant Based) 20% Infusion-[Known as SMOFLIPID 20% Infusion]  20 Gram(s) in IV Solution 100 milliLiter(s) infuse at 8.33 mL/Hr  Dose Rate: 0.31 Gm/kG/Day Infuse Over: 12 Hours; Stop After 12 Hours  Administration Instructions: Use 1.2 micron in-line filter  Entered: Jan 11 2023  5:00PM    Diet Soft and Bite Sized-  Fiber/Residue Restricted (LOWFIBER)  Supplement Feeding Modality:  Oral  Ensure Enlive Cans or Servings Per Day:  2       Frequency:  Three Times a day  Entered: Jan 11 2023 12:21PM    
This patient has been assessed with a concern for Malnutrition and has been determined to have a diagnosis/diagnoses of Severe protein-calorie malnutrition and Underweight (BMI < 19).    This patient is being managed with:   Parenteral Nutrition - Adult-  Entered: Jan 12 2023  5:00PM    fat emulsion (Fish Oil and Plant Based) 20% Infusion-[Known as SMOFLIPID 20% Infusion]  50 Gram(s) in IV Solution 250 milliLiter(s) infuse at 20.8 mL/Hr  Dose Rate: 0.7764 Gm/kG/Day Infuse Over: 12 Hours; Stop After 12 Hours  Administration Instructions: Use 1.2 micron in-line filter  Entered: Jan 12 2023  5:00PM    Diet Soft and Bite Sized-  Fiber/Residue Restricted (LOWFIBER)  Supplement Feeding Modality:  Oral  Ensure Enlive Cans or Servings Per Day:  2       Frequency:  Three Times a day  Entered: Jan 11 2023 12:21PM    
This patient has been assessed with a concern for Malnutrition and has been determined to have a diagnosis/diagnoses of Severe protein-calorie malnutrition and Underweight (BMI < 19).    This patient is being managed with:   Parenteral Nutrition - Adult-  Entered: Jan 16 2023  5:00PM    fat emulsion (Fish Oil and Plant Based) 20% Infusion-[Known as SMOFLIPID 20% Infusion]  20 Gram(s) in IV Solution 100 milliLiter(s) infuse at 8.32 mL/Hr  Dose Rate: 0.31 Gm/kG/Day Infuse Over: 12 Hours; Stop After 12 Hours  Administration Instructions: Use 1.2 micron in-line filter  Entered: Jan 16 2023  5:00PM    Diet Clear Liquid-  Supplement Feeding Modality:  Oral  Ensure Clear Cans or Servings Per Day:  2       Frequency:  Three Times a day  Entered: Jan 16 2023  2:36PM    
This patient has been assessed with a concern for Malnutrition and has been determined to have a diagnosis/diagnoses of Severe protein-calorie malnutrition and Underweight (BMI < 19).    This patient is being managed with:   Parenteral Nutrition - Adult-  Entered: Jan 26 2023  5:00PM    fat emulsion (Fish Oil and Plant Based) 20% Infusion-[Known as SMOFLIPID 20% Infusion]  50 Gram(s) in IV Solution 250 milliLiter(s) infuse at 20.8 mL/Hr  Dose Rate: 0.7764 Gm/kG/Day Infuse Over: 12 Hours; Stop After 12 Hours  Administration Instructions: Use 1.2 micron in-line filter  Entered: Jan 26 2023  5:00PM    fat emulsion (Fish Oil and Plant Based) 20% Infusion-[Known as SMOFLIPID 20% Infusion]  20 Gram(s) in IV Solution 100 milliLiter(s) infuse at 8.33 mL/Hr  Dose Rate: 0.31 Gm/kG/Day Infuse Over: 12 Hours; Stop After 12 Hours  Administration Instructions: Use 1.2 micron in-line filter  Entered: Jan 25 2023  5:00PM    Parenteral Nutrition - Adult-  Entered: Jan 25 2023  5:00PM    Diet Regular-    Start Time: 19:00  Supplement Feeding Modality:  Oral  Ensure Enlive Cans or Servings Per Day:  1       Frequency:  Three Times a day  Entered: Jan 20 2023  6:53AM    
This patient has been assessed with a concern for Malnutrition and has been determined to have a diagnosis/diagnoses of Severe protein-calorie malnutrition and Underweight (BMI < 19).    This patient is being managed with:   Parenteral Nutrition - Adult-  Entered: Jan 28 2023  5:00PM    fat emulsion (Fish Oil and Plant Based) 20% Infusion-[Known as SMOFLIPID 20% Infusion]  50 Gram(s) in IV Solution 250 milliLiter(s) infuse at 20.83 mL/Hr  Dose Rate: 0.774 Gm/kG/Day Infuse Over: 12 Hours; Stop After 12 Hours  Administration Instructions: Use 1.2 micron in-line filter  Entered: Jan 28 2023  5:00PM    Diet Regular-    Start Time: 19:00  Supplement Feeding Modality:  Oral  Ensure Enlive Cans or Servings Per Day:  1       Frequency:  Three Times a day  Entered: Jan 20 2023  6:53AM    
This patient has been assessed with a concern for Malnutrition and has been determined to have a diagnosis/diagnoses of Severe protein-calorie malnutrition and Underweight (BMI < 19).    This patient is being managed with:   Parenteral Nutrition - Adult-  Entered: Jan 29 2023  5:00PM    fat emulsion (Fish Oil and Plant Based) 20% Infusion-[Known as SMOFLIPID 20% Infusion]  49.85 Gram(s) in IV Solution 249.25 milliLiter(s) infuse at 20.8 mL/Hr  Dose Rate: 0.774 Gm/kG/Day Infuse Over: 12 Hours; Stop After 12 Hours  Administration Instructions: Use 1.2 micron in-line filter  Entered: Jan 29 2023  5:00PM    Parenteral Nutrition - Adult-  Entered: Jan 28 2023  5:00PM    fat emulsion (Fish Oil and Plant Based) 20% Infusion-[Known as SMOFLIPID 20% Infusion]  50 Gram(s) in IV Solution 250 milliLiter(s) infuse at 20.83 mL/Hr  Dose Rate: 0.774 Gm/kG/Day Infuse Over: 12 Hours; Stop After 12 Hours  Administration Instructions: Use 1.2 micron in-line filter  Entered: Jan 28 2023  5:00PM    Diet Regular-    Start Time: 19:00  Supplement Feeding Modality:  Oral  Ensure Enlive Cans or Servings Per Day:  1       Frequency:  Three Times a day  Entered: Jan 20 2023  6:53AM    
This patient has been assessed with a concern for Malnutrition and has been determined to have a diagnosis/diagnoses of Severe protein-calorie malnutrition and Underweight (BMI < 19).    This patient is being managed with:   Parenteral Nutrition - Adult-  Entered: Mar  1 2023  5:00PM    fat emulsion (Fish Oil and Plant Based) 20% Infusion-[Known as SMOFLIPID 20% Infusion]  50 Gram(s) in IV Solution 250 milliLiter(s) infuse at 20.83 mL/Hr  Dose Rate: 0.7764 Gm/kG/Day Infuse Over: 12 Hours; Stop After 12 Hours  Administration Instructions: Use 1.2 micron in-line filter  Entered: Mar  1 2023  5:00AM    Diet NPO after Midnight-     NPO Start Date: 01-Mar-2023   NPO Start Time: 23:59  Except Medications  Entered: Mar  1 2023  3:38AM    Parenteral Nutrition - Adult-  Entered: Feb 28 2023  5:00PM    Diet Regular-  Supplement Feeding Modality:  Oral  Ensure Max Cans or Servings Per Day:  1       Frequency:  Three Times a day  Entered: Feb 20 2023  8:30AM    
This patient has been assessed with a concern for Malnutrition and has been determined to have a diagnosis/diagnoses of Severe protein-calorie malnutrition and Underweight (BMI < 19).    This patient is being managed with:   Parenteral Nutrition - Adult-  Entered: Mar  2 2023  5:00PM    fat emulsion (Fish Oil and Plant Based) 20% Infusion-[Known as SMOFLIPID 20% Infusion]  50 Gram(s) in IV Solution 250 milliLiter(s) infuse at 20.83 mL/Hr  Dose Rate: 0.7764 Gm/kG/Day Infuse Over: 12 Hours; Stop After 12 Hours  Administration Instructions: Use 1.2 micron in-line filter  Entered: Mar  2 2023  5:00PM    Diet Regular-  Supplement Feeding Modality:  Oral  Ensure Max Cans or Servings Per Day:  1       Frequency:  Three Times a day  Entered: Feb 20 2023  8:30AM    
This patient has been assessed with a concern for Malnutrition and has been determined to have a diagnosis/diagnoses of Severe protein-calorie malnutrition and Underweight (BMI < 19).    This patient is being managed with:   Parenteral Nutrition - Adult-  Entered: Mar  4 2023  5:00PM    fat emulsion (Fish Oil and Plant Based) 20% Infusion-[Known as SMOFLIPID 20% Infusion]  50 Gram(s) in IV Solution 250 milliLiter(s) infuse at 20.8 mL/Hr  Dose Rate: 0.7764 Gm/kG/Day Infuse Over: 12 Hours; Stop After 12 Hours  Administration Instructions: Use 1.2 micron in-line filter  Entered: Mar  4 2023  5:00PM    Parenteral Nutrition - Adult-  Entered: Mar  3 2023  5:00PM    Diet Regular-  Supplement Feeding Modality:  Oral  Ensure Max Cans or Servings Per Day:  1       Frequency:  Three Times a day  Entered: Feb 20 2023  8:30AM    
This patient has been assessed with a concern for Malnutrition and has been determined to have a diagnosis/diagnoses of Severe protein-calorie malnutrition and Underweight (BMI < 19).    This patient is being managed with:   Parenteral Nutrition - Adult-  Entered: Mar  7 2023  5:00PM    fat emulsion (Fish Oil and Plant Based) 20% Infusion-[Known as SMOFLIPID 20% Infusion]  50 Gram(s) in IV Solution 250 milliLiter(s) infuse at 20.83 mL/Hr  Dose Rate: 0.7764 Gm/kG/Day Infuse Over: 12 Hours; Stop After 12 Hours  Administration Instructions: Use 1.2 micron in-line filter  Entered: Mar  7 2023  5:00AM    Diet NPO after Midnight-     NPO Start Date: 06-Mar-2023   NPO Start Time: 23:59  Except Medications  Entered: Mar  6 2023  6:30PM    Parenteral Nutrition - Adult-  Entered: Mar  6 2023  5:00PM    Diet Regular-  Supplement Feeding Modality:  Oral  Ensure Max Cans or Servings Per Day:  1       Frequency:  Three Times a day  Entered: Feb 20 2023  8:30AM    
This patient has been assessed with a concern for Malnutrition and has been determined to have a diagnosis/diagnoses of Severe protein-calorie malnutrition and Underweight (BMI < 19).    This patient is being managed with:   Parenteral Nutrition - Adult-  Entered: Mar  8 2023  5:00PM    Diet NPO-  Except Medications  Entered: Mar  8 2023  4:19PM    
This patient has been assessed with a concern for Malnutrition and has been determined to have a diagnosis/diagnoses of Severe protein-calorie malnutrition and Underweight (BMI < 19).    This patient is being managed with:   Parenteral Nutrition - Adult-  Entered: Mar  8 2023  5:00PM    fat emulsion (Fish Oil and Plant Based) 20% Infusion-[Known as SMOFLIPID 20% Infusion]  50 Gram(s) in IV Solution 250 milliLiter(s) infuse at 20.8 mL/Hr  Dose Rate: 0.7764 Gm/kG/Day Infuse Over: 12 Hours; Stop After 12 Hours  Administration Instructions: Use 1.2 micron in-line filter  Entered: Mar  8 2023  5:00PM    Diet NPO-  Except Medications  Entered: Mar  8 2023  4:19PM    
This patient has been assessed with a concern for Malnutrition and has been determined to have a diagnosis/diagnoses of Severe protein-calorie malnutrition and Underweight (BMI < 19).    This patient is being managed with:   Parenteral Nutrition - Adult-  Entered: Mar  8 2023  5:00PM    fat emulsion (Fish Oil and Plant Based) 20% Infusion-[Known as SMOFLIPID 20% Infusion]  50 Gram(s) in IV Solution 250 milliLiter(s) infuse at 20.8 mL/Hr  Dose Rate: 0.7764 Gm/kG/Day Infuse Over: 12 Hours; Stop After 12 Hours  Administration Instructions: Use 1.2 micron in-line filter  Entered: Mar  8 2023  5:00PM    Parenteral Nutrition - Adult-  Entered: Mar  7 2023  5:00PM    Diet NPO-  Entered: Mar  7 2023  4:50PM    
This patient has been assessed with a concern for Malnutrition and has been determined to have a diagnosis/diagnoses of Severe protein-calorie malnutrition and Underweight (BMI < 19).    This patient is being managed with:   Parenteral Nutrition - Adult-  Entered: Mar 10 2023  5:00PM    fat emulsion (Fish Oil and Plant Based) 20% Infusion-[Known as SMOFLIPID 20% Infusion]  50 Gram(s) in IV Solution 250 milliLiter(s) infuse at 20.83 mL/Hr  Dose Rate: 0.7764 Gm/kG/Day Infuse Over: 12 Hours; Stop After 12 Hours  Administration Instructions: Use 1.2 micron in-line filter  Entered: Mar 10 2023  5:00PM    Parenteral Nutrition - Adult-  Entered: Mar  9 2023  5:00PM    Diet NPO-  Except Medications  Entered: Mar  8 2023  4:19PM    
This patient has been assessed with a concern for Malnutrition and has been determined to have a diagnosis/diagnoses of Severe protein-calorie malnutrition and Underweight (BMI < 19).    This patient is being managed with:   Parenteral Nutrition - Adult-  Entered: Mar 11 2023  5:00PM    fat emulsion (Fish Oil and Plant Based) 20% Infusion-[Known as SMOFLIPID 20% Infusion]  50 Gram(s) in IV Solution 250 milliLiter(s) infuse at 20.8 mL/Hr  Dose Rate: 0.7764 Gm/kG/Day Infuse Over: 12 Hours; Stop After 12 Hours  Administration Instructions: Use 1.2 micron in-line filter  Entered: Mar 11 2023  5:00PM    Parenteral Nutrition - Adult-  Entered: Mar 10 2023  5:00PM    Diet NPO-  Except Medications  Entered: Mar  8 2023  4:19PM    
This patient has been assessed with a concern for Malnutrition and has been determined to have a diagnosis/diagnoses of Severe protein-calorie malnutrition and Underweight (BMI < 19).    This patient is being managed with:   Parenteral Nutrition - Adult-  Entered: Mar 19 2023  5:00PM    fat emulsion (Fish Oil and Plant Based) 20% Infusion-[Known as SMOFLIPID 20% Infusion]  50 Gram(s) in IV Solution 250 milliLiter(s) infuse at 20.83 mL/Hr  Dose Rate: 0.7764 Gm/kG/Day Infuse Over: 12 Hours; Stop After 12 Hours  Administration Instructions: Use 1.2 micron in-line filter  Entered: Mar 19 2023  5:00PM    Parenteral Nutrition - Adult-  Entered: Mar 18 2023  5:00PM    Diet NPO-  Except Medications  Entered: Mar  8 2023  4:19PM    
This patient has been assessed with a concern for Malnutrition and has been determined to have a diagnosis/diagnoses of Severe protein-calorie malnutrition and Underweight (BMI < 19).    This patient is being managed with:   Parenteral Nutrition - Adult-  Entered: Mar 20 2023  5:00PM    fat emulsion (Fish Oil and Plant Based) 20% Infusion-[Known as SMOFLIPID 20% Infusion]  50 Gram(s) in IV Solution 250 milliLiter(s) infuse at 20.83 mL/Hr  Dose Rate: 0.7764 Gm/kG/Day Infuse Over: 12 Hours; Stop After 12 Hours  Administration Instructions: Use 1.2 micron in-line filter  Entered: Mar 20 2023  5:00PM    Parenteral Nutrition - Adult-  Entered: Mar 19 2023  5:00PM    fat emulsion (Fish Oil and Plant Based) 20% Infusion-[Known as SMOFLIPID 20% Infusion]  50 Gram(s) in IV Solution 250 milliLiter(s) infuse at 20.83 mL/Hr  Dose Rate: 0.7764 Gm/kG/Day Infuse Over: 12 Hours; Stop After 12 Hours  Administration Instructions: Use 1.2 micron in-line filter  Entered: Mar 19 2023  5:00PM    Diet NPO-  Except Medications  Entered: Mar  8 2023  4:19PM    
This patient has been assessed with a concern for Malnutrition and has been determined to have a diagnosis/diagnoses of Severe protein-calorie malnutrition and Underweight (BMI < 19).    This patient is being managed with:   Parenteral Nutrition - Adult-  Entered: Mar 22 2023  5:00PM    fat emulsion (Fish Oil and Plant Based) 20% Infusion-[Known as SMOFLIPID 20% Infusion]  50 Gram(s) in IV Solution 250 milliLiter(s) infuse at 20.83 mL/Hr  Dose Rate: 0.7764 Gm/kG/Day Infuse Over: 12 Hours; Stop After 12 Hours  Administration Instructions: Use 1.2 micron in-line filter  Entered: Mar 22 2023  5:00PM    Diet NPO-  Except Medications  Entered: Mar  8 2023  4:19PM    
This patient has been assessed with a concern for Malnutrition and has been determined to have a diagnosis/diagnoses of Severe protein-calorie malnutrition and Underweight (BMI < 19).    This patient is being managed with:   Parenteral Nutrition - Adult-  Entered: Mar 23 2023  5:00PM    fat emulsion (Fish Oil and Plant Based) 20% Infusion-[Known as SMOFLIPID 20% Infusion]  50 Gram(s) in IV Solution 250 milliLiter(s) infuse at 20.83 mL/Hr  Dose Rate: 0.7764 Gm/kG/Day Infuse Over: 12 Hours; Stop After 12 Hours  Administration Instructions: Use 1.2 micron in-line filter  Entered: Mar 23 2023  5:00PM    Diet NPO-  Except Medications  Entered: Mar  8 2023  4:19PM    
This patient has been assessed with a concern for Malnutrition and has been determined to have a diagnosis/diagnoses of Severe protein-calorie malnutrition and Underweight (BMI < 19).    This patient is being managed with:   Parenteral Nutrition - Adult-  Entered: Mar 25 2023  5:00PM    fat emulsion (Fish Oil and Plant Based) 20% Infusion-[Known as SMOFLIPID 20% Infusion]  50 Gram(s) in IV Solution 250 milliLiter(s) infuse at 20.83 mL/Hr  Dose Rate: 0.7764 Gm/kG/Day Infuse Over: 12 Hours; Stop After 12 Hours  Administration Instructions: Use 1.2 micron in-line filter  Entered: Mar 25 2023  5:00PM    Parenteral Nutrition - Adult-  Entered: Mar 24 2023  5:00PM    Diet NPO-  Except Medications  Entered: Mar  8 2023  4:19PM  
This patient has been assessed with a concern for Malnutrition and has been determined to have a diagnosis/diagnoses of Severe protein-calorie malnutrition and Underweight (BMI < 19).    This patient is being managed with:   Parenteral Nutrition - Adult-  Entered: Mar 27 2023  5:00PM    fat emulsion (Fish Oil and Plant Based) 20% Infusion-[Known as SMOFLIPID 20% Infusion]  50 Gram(s) in IV Solution 250 milliLiter(s) infuse at 20.83 mL/Hr  Dose Rate: 0.7764 Gm/kG/Day Infuse Over: 12 Hours; Stop After 12 Hours  Administration Instructions: Use 1.2 micron in-line filter  Entered: Mar 27 2023  5:00PM    Diet NPO-  Except Medications  Entered: Mar  8 2023  4:19PM  
This patient has been assessed with a concern for Malnutrition and has been determined to have a diagnosis/diagnoses of Severe protein-calorie malnutrition and Underweight (BMI < 19).    This patient is being managed with:   Parenteral Nutrition - Adult-  Entered: Mar 28 2023  5:00PM    fat emulsion (Fish Oil and Plant Based) 20% Infusion-[Known as SMOFLIPID 20% Infusion]  50 Gram(s) in IV Solution 250 milliLiter(s) infuse at 20.8 mL/Hr  Dose Rate: 0.7764 Gm/kG/Day Infuse Over: 12 Hours; Stop After 12 Hours  Administration Instructions: Use 1.2 micron in-line filter  Entered: Mar 28 2023  5:00PM    Parenteral Nutrition - Adult-  Entered: Mar 27 2023  5:00PM    Diet NPO-  Except Medications  Entered: Mar  8 2023  4:19PM  
This patient has been assessed with a concern for Malnutrition and has been determined to have a diagnosis/diagnoses of Severe protein-calorie malnutrition and Underweight (BMI < 19).    This patient is being managed with:   fat emulsion (Fish Oil and Plant Based) 20% Infusion-[Known as SMOFLIPID 20% Infusion]  20 Gram(s) in IV Solution 100 milliLiter(s) infuse at 8.33 mL/Hr  Dose Rate: 0.3106 Gm/kG/Day Infuse Over: 12 Hours; Stop After 12 Hours  Administration Instructions: Use 1.2 micron in-line filter  Entered: Jan 18 2023  5:00PM    Parenteral Nutrition - Adult-  Entered: Jan 18 2023  5:00PM    Diet Full Liquid-  Supplement Feeding Modality:  Oral  Ensure Max Cans or Servings Per Day:  1       Frequency:  Three Times a day  Entered: Jan 17 2023  3:19PM    
This patient has been assessed with a concern for Malnutrition and has been determined to have a diagnosis/diagnoses of Severe protein-calorie malnutrition and Underweight (BMI < 19).    This patient is being managed with:   fat emulsion (Fish Oil and Plant Based) 20% Infusion-[Known as SMOFLIPID 20% Infusion]  50 Gram(s) in IV Solution 250 milliLiter(s) infuse at 20.8 mL/Hr  Dose Rate: 0.7764 Gm/kG/Day Infuse Over: 12 Hours; Stop After 12 Hours  Administration Instructions: Use 1.2 micron in-line filter  Entered: Mar 29 2023  5:00PM    Parenteral Nutrition - Adult-  Entered: Mar 29 2023  5:00PM    Diet NPO-  Except Medications  Entered: Mar  8 2023  4:19PM  
This patient has been assessed with a concern for Malnutrition and has been determined to have a diagnosis/diagnoses of Severe protein-calorie malnutrition and Underweight (BMI < 19).    This patient is being managed with:   fat emulsion (Fish Oil and Plant Based) 20% Infusion-[Known as SMOFLIPID 20% Infusion]  50 Gram(s) in IV Solution 250 milliLiter(s) infuse at 20.8 mL/Hr  Dose Rate: 0.7764 Gm/kG/Day Infuse Over: 12 Hours; Stop After 12 Hours  Administration Instructions: Use 1.2 micron in-line filter  Entered: Mar 29 2023  5:00PM    Parenteral Nutrition - Adult-  Entered: Mar 29 2023  5:00PM    Parenteral Nutrition - Adult-  Entered: Mar 28 2023  5:00PM    Diet NPO-  Except Medications  Entered: Mar  8 2023  4:19PM  
This patient has been assessed with a concern for Malnutrition and has been determined to have a diagnosis/diagnoses of Severe protein-calorie malnutrition and Underweight (BMI < 19).    This patient is being managed with:   fat emulsion (Fish Oil and Plant Based) 20% Infusion-[Known as SMOFLIPID 20% Infusion]  50 Gram(s) in IV Solution 250 milliLiter(s) infuse at 20.8 mL/Hr  Dose Rate: 0.7764 Gm/kG/Day Infuse Over: 12 Hours; Stop After 12 Hours  Administration Instructions: Use 1.2 micron in-line filter  Entered: Mar 29 2023  5:00PM    Parenteral Nutrition - Adult-  Entered: Mar 29 2023  5:00PM    Parenteral Nutrition - Adult-  Entered: Mar 28 2023  5:00PM    Diet NPO-  Except Medications  Entered: Mar  8 2023  4:19PM  
This patient has been assessed with a concern for Malnutrition and has been determined to have a diagnosis/diagnoses of Severe protein-calorie malnutrition and Underweight (BMI < 19).    This patient is being managed with:   fat emulsion (Fish Oil and Plant Based) 20% Infusion-[Known as SMOFLIPID 20% Infusion]  50 Gram(s) in IV Solution 250 milliLiter(s) infuse at 20.83 mL/Hr  Dose Rate: 0.7764 Gm/kG/Day Infuse Over: 12 Hours; Stop After 12 Hours  Administration Instructions: Use 1.2 micron in-line filter  Entered: Mar 30 2023  5:00PM    Parenteral Nutrition - Adult-  Entered: Mar 30 2023  5:00PM    Diet NPO-  Except Medications  Entered: Mar  8 2023  4:19PM  
This patient has been assessed with a concern for Malnutrition and has been determined to have a diagnosis/diagnoses of Severe protein-calorie malnutrition and Underweight (BMI < 19).    This patient is being managed with:   Diet NPO-  Except Medications  Entered: Dec  8 2022  4:52PM    
This patient has been assessed with a concern for Malnutrition and has been determined to have a diagnosis/diagnoses of Severe protein-calorie malnutrition and Underweight (BMI < 19).    This patient is being managed with:   Diet NPO-  Entered: Feb 10 2023  1:22PM    
This patient has been assessed with a concern for Malnutrition and has been determined to have a diagnosis/diagnoses of Severe protein-calorie malnutrition and Underweight (BMI < 19).    This patient is being managed with:   Diet DASH/TLC-  Sodium & Cholesterol Restricted  Supplement Feeding Modality:  Oral  Ensure Enlive Cans or Servings Per Day:  1       Frequency:  Three Times a day  Entered: Dec  8 2022  1:46PM    
This patient has been assessed with a concern for Malnutrition and has been determined to have a diagnosis/diagnoses of Severe protein-calorie malnutrition and Underweight (BMI < 19).    This patient is being managed with:   Diet DASH/TLC-  Sodium & Cholesterol Restricted  Supplement Feeding Modality:  Oral  Ensure Enlive Cans or Servings Per Day:  1       Frequency:  Three Times a day  Entered: Dec  9 2022  5:27PM    
This patient has been assessed with a concern for Malnutrition and has been determined to have a diagnosis/diagnoses of Severe protein-calorie malnutrition and Underweight (BMI < 19).    This patient is being managed with:   Diet NPO-  Entered: Dec 11 2022  2:57AM

## 2023-04-03 NOTE — PROGRESS NOTE ADULT - SUBJECTIVE AND OBJECTIVE BOX
INTERVAL/OVERNIGHT EVENTS: Comfort measures started.     SUBJECTIVE:     POD #  SICU Day #    Neurologic Medications  HYDROmorphone  Injectable 0.5 milliGRAM(s) IV Push every 3 hours PRN Moderate Pain (4 - 6)  HYDROmorphone  Injectable 1 milliGRAM(s) IV Push every 3 hours PRN Severe Pain (7 - 10)  LORazepam   Injectable 1 milliGRAM(s) IV Push every 3 hours PRN tachypnea >22 or discomfort  ondansetron Injectable 4 milliGRAM(s) IV Push every 6 hours PRN Nausea and/or Vomiting    Respiratory Medications    Cardiovascular Medications  aMIOdarone Infusion 0.5 mG/Min IV Continuous <Continuous>  norepinephrine Infusion 0.1 MICROgram(s)/kG/Min IV Continuous <Continuous>    Gastrointestinal Medications  pantoprazole  Injectable 40 milliGRAM(s) IV Push every 12 hours  sodium chloride 0.9% lock flush 10 milliLiter(s) IV Push every 8 hours  sodium chloride 0.9% lock flush 10 milliLiter(s) IV Push every 1 hour PRN Pre/post blood products, medications, blood draw, and to maintain line patency    Genitourinary Medications    Hematologic/Oncologic Medications  influenza  Vaccine (HIGH DOSE) 0.7 milliLiter(s) IntraMuscular once    Antimicrobial/Immunologic Medications    Endocrine/Metabolic Medications    Topical/Other Medications  benzocaine/menthol Lozenge 1 Lozenge Oral every 4 hours PRN Sore Throat  chlorhexidine 2% Cloths 1 Application(s) Topical daily  lidocaine   4% Patch 1 Patch Transdermal daily  nystatin Powder 1 Application(s) Topical two times a day  povidone iodine 10% Solution 1 Application(s) Topical daily      MEDICATIONS  (PRN):  benzocaine/menthol Lozenge 1 Lozenge Oral every 4 hours PRN Sore Throat  HYDROmorphone  Injectable 0.5 milliGRAM(s) IV Push every 3 hours PRN Moderate Pain (4 - 6)  HYDROmorphone  Injectable 1 milliGRAM(s) IV Push every 3 hours PRN Severe Pain (7 - 10)  LORazepam   Injectable 1 milliGRAM(s) IV Push every 3 hours PRN tachypnea >22 or discomfort  ondansetron Injectable 4 milliGRAM(s) IV Push every 6 hours PRN Nausea and/or Vomiting  sodium chloride 0.9% lock flush 10 milliLiter(s) IV Push every 1 hour PRN Pre/post blood products, medications, blood draw, and to maintain line patency      I&O's Detail    2023 07:01  -  2023 07:00  --------------------------------------------------------  IN:    Amiodarone: 350.7 mL    Fat Emulsion (Fish Oil &amp; Plant Based) 20% Infusion: 249.6 mL    Norepinephrine: 98.6 mL    PRBCs (Packed Red Blood Cells): 300 mL    TPN (Total Parenteral Nutrition): 1323 mL  Total IN: 2321.9 mL    OUT:    Bulb (mL): 9 mL    Bulb (mL): 45 mL    Drain (mL): 12 mL    Drain (mL): 12 mL    Incontinent per Condom Catheter (mL): 190 mL    Nasogastric/Oral tube (mL): 600 mL    Voided (mL): 250 mL  Total OUT: 1118 mL    Total NET: 1203.9 mL      2023 07:01  -  2023 06:58  --------------------------------------------------------  IN:    Amiodarone: 367.4 mL    IV PiggyBack: 100 mL    Norepinephrine: 53.7 mL    Norepinephrine: 113.7 mL    Oral Fluid: 280 mL    TPN (Total Parenteral Nutrition): 693 mL  Total IN: 1607.8 mL    OUT:    Bulb (mL): 30 mL    Bulb (mL): 28 mL    Drain (mL): 5 mL    Drain (mL): 5 mL    Incontinent per Condom Catheter (mL): 175 mL    Nasogastric/Oral tube (mL): 1700 mL    Voided (mL): 200 mL  Total OUT: 2143 mL    Total NET: -535.2 mL          Vital Signs Last 24 Hrs  T(C): 36.2 (2023 04:19), Max: 38.7 (2023 07:00)  T(F): 97.1 (2023 04:19), Max: 101.7 (2023 07:00)  HR: 96 (2023 05:00) (95 - 112)  BP: 107/64 (2023 04:00) (64/37 - 138/62)  BP(mean): 81 (2023 04:00) (45 - 89)  RR: 15 (2023 05:00) (13 - 28)  SpO2: 96% (2023 05:00) (90% - 100%)    Parameters below as of 2023 06:00  Patient On (Oxygen Delivery Method): room air        GENERAL: NAD, resting comfortably in bed  HEENT: NCAT, MMM  C/V: Normal rate, normal peripheral perfusion  PULM: Nonlabored breathing, no respiratory distress,   ABD: Soft, ND, NT, no rebound tenderness, no guarding  EXTREM: WWP, no edema, SCDs in place  NEURO: No focal deficits    LABS:                        9.7    37.01 )-----------( 519      ( 2023 06:14 )             29.8     04-02    136  |  101  |  39<H>  ----------------------------<  122<H>  5.1   |  22  |  1.18    Ca    7.4<L>      2023 06:14  Phos  3.7     04-02  Mg     1.9     04-02    TPro  x   /  Alb  2.1<L>  /  TBili  x   /  DBili  x   /  AST  x   /  ALT  x   /  AlkPhos  x   04-01      Urinalysis Basic - ( 2023 19:31 )    Color: Yellow / Appearance: SL Cloudy / S.020 / pH: x  Gluc: x / Ketone: NEGATIVE  / Bili: Negative / Urobili: 0.2 E.U./dL   Blood: x / Protein: 30 mg/dL / Nitrite: NEGATIVE   Leuk Esterase: NEGATIVE / RBC: < 5 /HPF / WBC < 5 /HPF   Sq Epi: x / Non Sq Epi: 0-5 /HPF / Bacteria: Present /HPF        RADIOLOGY & ADDITIONAL STUDIES:  CT Abdomen and Pelvis w/ IV Cont:   ACC: [id number] EXAM:  CT ABDOMEN AND PELVIS IC   ORDERED BY: JOSE ANDUJAR     PROCEDURE DATE:  2023          INTERPRETATION:  PROCEDURE INFORMATION:  Exam: CT Abdomen And Pelvis With Contrast  Exam date and time: 2023 2:41 AM  Age: 78 years old  Clinical indication: Fever; Prior surgery; Surgery type: Multiple prior  abdominal surgeries due to ischemic bowel, c/b leak S/P IR drainage.    TECHNIQUE:  Imaging protocol: Computed tomography of the abdomen and pelvis with   contrast.    COMPARISON:  CT ABDOMEN AND PELVIS WITH ORAL CONTRAST WITH IV CONTRAST 3/23/2023 7:27   PM    FINDINGS:  Tubes, catheters and devices: Multiple percutaneous drainage catheters   present.    Pleural spaces: Moderate to large bilateral pleural effusions.    Liver: Normal. No mass.  Gallbladder and bile ducts: Vicarious excretion of contrast in the lumen   of the  gallbladder. No biliary ductal dilatation.  Pancreas: Unremarkable.  Spleen: Normal.  Adrenal glands: Normal. No mass.  Kidneys and ureters: Multiple bilateral renal cysts.    Stomach and bowel: On images 78-90 of axial series 3, there is an   irregular  loculated collection of gas, fluid, and contrast density, spanning  approximately 16 cm. It is unclear if this is a loop of bowel or if this   is a  collection of extravasated contrast/contained perforation/abscess. Diffuse  inflammatory thickening of the wall of the entire colon, compatible with  pancolitis. Multiple loops of relatively proximal to mid small bowel are   mildly  dilated/distended with dilute oral contrast with a questionable transition  point in the left lower quadrant. This may signify a partial mid small   bowel  obstruction or could be an ileus.  Appendix: Normal appendix.    Intraperitoneal space: Numerous partially confluent large volume  intraperitoneal abscesses in the abdomen and pelvis. Due to artifact   created by  scanning with large field of view and patient&apos;s arms in the field of   view as  well as diffuse mesenteric edema and relative paucity of intraperitoneal   fat,  the bowel is incredibly difficult to trace. As result, is difficult in   some  areas to distinguish what is bowel and what is abscess. There is oral   contrast  throughout the decompressed colon and there is dilute contrast in some of   the  loops of small bowel. In the low central mesentery (images 101-122 of   axial  series 3), there is an approximately 12 cm homogeneously hyperdense   collection  of non capsulated fluid which is most likely extravasated dilute contrast   from  small bowel at or near 1 of the anastomoses, less likely hematoma.   Multiple  foci of gas in the non dependent peritoneal cavity may be contained within  abscesses or may be pneumoperitoneum. Previously measured collections in   association with right and left lateral drainage catheters are decreased.  Vasculature: IVC is flattened and the mesenteric arteries appear fairly  constricted suggesting hypotension/hypovolemia.  Lymph nodes: Unremarkable.    Urinary bladder: Unremarkable as visualized.  Reproductive: Enlarged as visualized.    Bones/joints: Right hip joint effusion could be sterile or septic.  Soft tissues: Unremarkable.    Other findings: Emphysema.    IMPRESSION:  1.   Previously measured collections in association with right and left   lateral drainage catheters are decreased.. However, overall fluid in the   abdomen as well as additional collections elsewhere have increased.  2.   In the low central mesentery (images 101-122 of axial series 3),   there is  an approximately 12 cm homogeneously hyperdense collection of non   capsulated  fluid which is most likely extravasated dilute contrast from small bowel   at or  near 1 of the anastomoses, less likely hematoma.  3.   On images 78-90 of axial series 3, there is an irregular loculated  collection of gas, fluid, and contrast density, spanning approximately 16   cm.  It is unclear if this is a loop of bowel or if this is a collection of  extravasated contrast/contained perforation/abscess.  4.   Moderateto large bilateral pleural effusions.  5.   Diffuse inflammatory thickening of the wall of the entire colon,  compatible with pancolitis.  6.   Multiple foci of gas in the non dependent peritoneal cavity may be  contained within abscesses or may be pneumoperitoneum.  7.   IVC is flattened and the mesenteric arteries appear fairly   constricted  suggesting hypotension/hypovolemia.  8.   Right hip joint effusion could be sterile or septic.    Addendum created by Dr. Checo Delong MD on 2023 5:44:22 AM EDT  Findings discussed with Eddie STEVENSON at time of interpretation.    --- End of Report ---            LOREN SMITH MD; Attending Radiologist  This document has been electronically signed. 2023  4:19PM (23 @ 02:53)      Urinalysis with Rflx Culture (collected 23 @ 19:31)    Culture - Blood (collected 23 @ 18:42)  Source: .Blood Blood-Peripheral  Preliminary Report (23 @ 19:00):    No growth at 1 day.    Culture - Blood (collected 23 @ 18:42)  Source: .Blood Blood-Peripheral  Preliminary Report (23 @ 19:00):    No growth at 1 day.     INTERVAL/OVERNIGHT EVENTS: Comfort measures started.     SUBJECTIVE: This AM doing well without pain. Wants to eat.     Neurologic Medications  HYDROmorphone  Injectable 0.5 milliGRAM(s) IV Push every 3 hours PRN Moderate Pain (4 - 6)  HYDROmorphone  Injectable 1 milliGRAM(s) IV Push every 3 hours PRN Severe Pain (7 - 10)  LORazepam   Injectable 1 milliGRAM(s) IV Push every 3 hours PRN tachypnea >22 or discomfort  ondansetron Injectable 4 milliGRAM(s) IV Push every 6 hours PRN Nausea and/or Vomiting    Cardiovascular Medications  aMIOdarone Infusion 0.5 mG/Min IV Continuous <Continuous>    Gastrointestinal Medications  pantoprazole  Injectable 40 milliGRAM(s) IV Push every 12 hours    Hematologic/Oncologic Medications  influenza  Vaccine (HIGH DOSE) 0.7 milliLiter(s) IntraMuscular once    Topical/Other Medications  benzocaine/menthol Lozenge 1 Lozenge Oral every 4 hours PRN Sore Throat  chlorhexidine 2% Cloths 1 Application(s) Topical daily  lidocaine   4% Patch 1 Patch Transdermal daily  nystatin Powder 1 Application(s) Topical two times a day  povidone iodine 10% Solution 1 Application(s) Topical daily    MEDICATIONS  (PRN):  benzocaine/menthol Lozenge 1 Lozenge Oral every 4 hours PRN Sore Throat  HYDROmorphone  Injectable 0.5 milliGRAM(s) IV Push every 3 hours PRN Moderate Pain (4 - 6)  HYDROmorphone  Injectable 1 milliGRAM(s) IV Push every 3 hours PRN Severe Pain (7 - 10)  LORazepam   Injectable 1 milliGRAM(s) IV Push every 3 hours PRN tachypnea >22 or discomfort  ondansetron Injectable 4 milliGRAM(s) IV Push every 6 hours PRN Nausea and/or Vomiting  sodium chloride 0.9% lock flush 10 milliLiter(s) IV Push every 1 hour PRN Pre/post blood products, medications, blood draw, and to maintain line patency    I&O's Detail    2023 07:01  -  2023 07:00  --------------------------------------------------------  IN:    Amiodarone: 350.7 mL    Fat Emulsion (Fish Oil &amp; Plant Based) 20% Infusion: 249.6 mL    Norepinephrine: 98.6 mL    PRBCs (Packed Red Blood Cells): 300 mL    TPN (Total Parenteral Nutrition): 1323 mL  Total IN: 2321.9 mL    OUT:    Bulb (mL): 9 mL    Bulb (mL): 45 mL    Drain (mL): 12 mL    Drain (mL): 12 mL    Incontinent per Condom Catheter (mL): 190 mL    Nasogastric/Oral tube (mL): 600 mL    Voided (mL): 250 mL  Total OUT: 1118 mL    Total NET: 1203.9 mL    2023 07:01  -  2023 06:58  --------------------------------------------------------  IN:    Amiodarone: 367.4 mL    IV PiggyBack: 100 mL    Norepinephrine: 53.7 mL    Norepinephrine: 113.7 mL    Oral Fluid: 280 mL    TPN (Total Parenteral Nutrition): 693 mL  Total IN: 1607.8 mL    OUT:    Bulb (mL): 30 mL    Bulb (mL): 28 mL    Drain (mL): 5 mL    Drain (mL): 5 mL    Incontinent per Condom Catheter (mL): 175 mL    Nasogastric/Oral tube (mL): 1700 mL    Voided (mL): 200 mL  Total OUT: 2143 mL    Total NET: -535.2 mL    Vital Signs Last 24 Hrs  T(C): 36.2 (2023 04:19), Max: 38.7 (2023 07:00)  T(F): 97.1 (2023 04:19), Max: 101.7 (2023 07:00)  HR: 96 (2023 05:00) (95 - 112)  BP: 107/64 (2023 04:00) (64/37 - 138/62)  BP(mean): 81 (2023 04:00) (45 - 89)  RR: 15 (2023 05:00) (13 - 28)  SpO2: 96% (2023 05:00) (90% - 100%)    Parameters below as of 2023 06:00  Patient On (Oxygen Delivery Method): room air    GENERAL: NAD, resting comfortably in bed, depressed mood, follows commands, answers questions appropriately  HEENT: NCAT, MMM  C/V: Normal rate, normal peripheral perfusion, no murmurs  PULM: Nonlabored breathing, no respiratory distress, CTA bl  ABD: Soft, ND, minimal TTP, drain purulent x1 and SS others, no rebound tenderness, no guarding  EXTREM: WWP, no edema, SCDs in place  NEURO: No focal deficits    LABS:                        9.7    37.01 )-----------( 519      ( 2023 06:14 )             29.8     04-02    136  |  101  |  39<H>  ----------------------------<  122<H>  5.1   |  22  |  1.18    Ca    7.4<L>      2023 06:14  Phos  3.7     04-02  Mg     1.9     04-02    TPro  x   /  Alb  2.1<L>  /  TBili  x   /  DBili  x   /  AST  x   /  ALT  x   /  AlkPhos  x   04-01    Urinalysis Basic - ( 2023 19:31 )    Color: Yellow / Appearance: SL Cloudy / S.020 / pH: x  Gluc: x / Ketone: NEGATIVE  / Bili: Negative / Urobili: 0.2 E.U./dL   Blood: x / Protein: 30 mg/dL / Nitrite: NEGATIVE   Leuk Esterase: NEGATIVE / RBC: < 5 /HPF / WBC < 5 /HPF   Sq Epi: x / Non Sq Epi: 0-5 /HPF / Bacteria: Present /HPF    RADIOLOGY & ADDITIONAL STUDIES:  CT Abdomen and Pelvis w/ IV Cont:   ACC: [id number] EXAM:  CT ABDOMEN AND PELVIS IC   ORDERED BY: JOSE ANDUJAR     PROCEDURE DATE:  2023      INTERPRETATION:  PROCEDURE INFORMATION:  Exam: CT Abdomen And Pelvis With Contrast  Exam date and time: 2023 2:41 AM  Age: 78 years old  Clinical indication: Fever; Prior surgery; Surgery type: Multiple prior  abdominal surgeries due to ischemic bowel, c/b leak S/P IR drainage.    TECHNIQUE:  Imaging protocol: Computed tomography of the abdomen and pelvis with   contrast.    COMPARISON:  CT ABDOMEN AND PELVIS WITH ORAL CONTRAST WITH IV CONTRAST 3/23/2023 7:27   PM    FINDINGS:  Tubes, catheters and devices: Multiple percutaneous drainage catheters   present.    Pleural spaces: Moderate to large bilateral pleural effusions.    Liver: Normal. No mass.  Gallbladder and bile ducts: Vicarious excretion of contrast in the lumen   of the  gallbladder. No biliary ductal dilatation.  Pancreas: Unremarkable.  Spleen: Normal.  Adrenal glands: Normal. No mass.  Kidneys and ureters: Multiple bilateral renal cysts.    Stomach and bowel: On images 78-90 of axial series 3, there is an   irregular  loculated collection of gas, fluid, and contrast density, spanning  approximately 16 cm. It is unclear if this is a loop of bowel or if this   is a  collection of extravasated contrast/contained perforation/abscess. Diffuse  inflammatory thickening of the wall of the entire colon, compatible with  pancolitis. Multiple loops of relatively proximal to mid small bowel are   mildly  dilated/distended with dilute oral contrast with a questionable transition  point in the left lower quadrant. This may signify a partial mid small   bowel  obstruction or could be an ileus.  Appendix: Normal appendix.    Intraperitoneal space: Numerous partially confluent large volume  intraperitoneal abscesses in the abdomen and pelvis. Due to artifact   created by  scanning with large field of view and patient&apos;s arms in the field of   view as  well as diffuse mesenteric edema and relative paucity of intraperitoneal   fat,  the bowel is incredibly difficult to trace. As result, is difficult in   some  areas to distinguish what is bowel and what is abscess. There is oral   contrast  throughout the decompressed colon and there is dilute contrast in some of   the  loops of small bowel. In the low central mesentery (images 101-122 of   axial  series 3), there is an approximately 12 cm homogeneously hyperdense   collection  of non capsulated fluid which is most likely extravasated dilute contrast   from  small bowel at or near 1 of the anastomoses, less likely hematoma.   Multiple  foci of gas in the non dependent peritoneal cavity may be contained within  abscesses or may be pneumoperitoneum. Previously measured collections in   association with right and left lateral drainage catheters are decreased.  Vasculature: IVC is flattened and the mesenteric arteries appear fairly  constricted suggesting hypotension/hypovolemia.  Lymph nodes: Unremarkable.    Urinary bladder: Unremarkable as visualized.  Reproductive: Enlarged as visualized.    Bones/joints: Right hip joint effusion could be sterile or septic.  Soft tissues: Unremarkable.    Other findings: Emphysema.    IMPRESSION:  1.   Previously measured collections in association with right and left   lateral drainage catheters are decreased.. However, overall fluid in the   abdomen as well as additional collections elsewhere have increased.  2.   In the low central mesentery (images 101-122 of axial series 3),   there is  an approximately 12 cm homogeneously hyperdense collection of non   capsulated  fluid which is most likely extravasated dilute contrast from small bowel   at or  near 1 of the anastomoses, less likely hematoma.  3.   On images 78-90 of axial series 3, there is an irregular loculated  collection of gas, fluid, and contrast density, spanning approximately 16   cm.  It is unclear if this is a loop of bowel or if this is a collection of  extravasated contrast/contained perforation/abscess.  4.   Moderateto large bilateral pleural effusions.  5.   Diffuse inflammatory thickening of the wall of the entire colon,  compatible with pancolitis.  6.   Multiple foci of gas in the non dependent peritoneal cavity may be  contained within abscesses or may be pneumoperitoneum.  7.   IVC is flattened and the mesenteric arteries appear fairly   constricted  suggesting hypotension/hypovolemia.  8.   Right hip joint effusion could be sterile or septic.    Addendum created by Dr. Checo Delong MD on 2023 5:44:22 AM EDT  Findings discussed with Eddie STEVENSON at time of interpretation.    --- End of Report ---    LOREN SMITH MD; Attending Radiologist  This document has been electronically signed. 2023  4:19PM (23 @ 02:53)    Urinalysis with Rflx Culture (collected 23 @ 19:31)    Culture - Blood (collected 23 @ 18:42)  Source: .Blood Blood-Peripheral  Preliminary Report (23 @ 19:00):    No growth at 1 day.    Culture - Blood (collected 23 @ 18:42)  Source: .Blood Blood-Peripheral  Preliminary Report (23 @ 19:00):    No growth at 1 day.

## 2023-04-03 NOTE — PROGRESS NOTE ADULT - PROBLEM SELECTOR PLAN 5
.  s/p multiple surgical interventions, c/b intra-abdominal infection  -no further surgical interventions planned  -hospice eligible and appropriate

## 2023-04-03 NOTE — PROGRESS NOTE ADULT - CONVERSATION DETAILS
Discussed inpatient hospice transition for continued symptom management at end of life. Sister is in agreement. Reviewed the role of hospice services and delineated the benefits provided. Differentiated inpatient vs home hospice and explained the intended philosophy of care. Patient is appropriate for inpatient hospice at this time and family is amenable to referral.

## 2023-04-03 NOTE — PROGRESS NOTE ADULT - ASSESSMENT
77yo M with PMH of Substance Use Disorder p/w encephalopathy with complicated hospital course requiring multiple surgical interventions now with incurable infection. Palliative consulted for complex medical decision making in the setting of critical illness.    ·	HCP amenable to inpatient hospice transition, will require some further coordination (insurance check, bed availability on 7WO, etc)  ·	Symptom-directed Care, Comfort Measures Only, MEWS Exempt, Pleasure Feeds, No Escalation of Care (no blood draws, IVF)  ·	When a private bed is available on 7WO, the patient can be moved there under the Surgical Service. Will need a discharge summary for this admission. Palliative will notify the team when the patient is ready to be admitted under hospice and a discharge order can be placed (this will be planned and communicated beforehand)

## 2023-04-03 NOTE — PROVIDER CONTACT NOTE (CRITICAL VALUE NOTIFICATION) - PERSON GIVING RESULT:
Hematology
RADHA Marie - laboratory
lab
Hematology
Hematology- Rafat Saba
lab
microbio
Microbiology
Microbiology
Hematology- DENISSE Hassan
KATIE Fernandez from Hematology

## 2023-04-03 NOTE — PROGRESS NOTE ADULT - PROBLEM SELECTOR PROBLEM 1
Atrial flutter
Acute systolic congestive heart failure
Atrial flutter
Abdominal pain
Acute systolic congestive heart failure
Poor appetite
Severe protein-calorie malnutrition
Abdominal pain
Poor appetite

## 2023-04-03 NOTE — CHART NOTE - NSCHARTNOTEFT_GEN_A_CORE
DEATH NOTE  Called to pt's bedside by nurse stating pt was in asystole on monitor.   Pt was made comfort care today is DNR/DNI and was awaiting palliative bed.     On physical exam, patient did not respond to verbal or noxious stimuli.  No spontaneous respirations.  Absent heart and breath sounds.  Absent Femoral and carotid pulses.   Pupils are fixed and dilated, no corneal reflex.  EKG rhythm strip shows asystole.   Patient pronounced dead at 10:22pm.  Attending notified.  Sister Sophie notified and declining  autopsy.

## 2023-04-03 NOTE — PROGRESS NOTE ADULT - ATTENDING COMMENTS
Patient seen and examined with house-staff during bedside rounds.  Resident note read, including vitals, physical findings, laboratory data, and radiological reports.   Revisions included below.  Direct personal management at bed side and extensive interpretation of the data.  Plan was outlined and discussed in details with the housestaff.  Decision making of high complexity  Action taken for acute disease activity to reflect the level of care provided:  - medication reconciliation  - review laboratory data Patient seen and examined with house-staff during bedside rounds.  Resident note read, including vitals, physical findings, laboratory data, and radiological reports.   Revisions included below.  Direct personal management at bed side and extensive interpretation of the data.  Plan was outlined and discussed in details with the housestaff.  Decision making of high complexity  Action taken for acute disease activity to reflect the level of care provided:  - medication reconciliation  - review laboratory data  Patient will be transferred to palliative service.  No escalation of therapy.  Comfort care.

## 2023-04-03 NOTE — PROVIDER CONTACT NOTE (CRITICAL VALUE NOTIFICATION) - TEST AND RESULT REPORTED:
hgb 6.8
INR 6.45
MCV is 98.4 and was 89.8 13hrs ago
Blood culture positive for VRE
Hemoglobin 6.0 & Hematocrit of 19.4
WBC - 40.04
hgb 6.8 hct 20.9
HGB 5.7 HCT 18
Positive blood culture
Hemoglobin 6.8
INR 6.15
positive BC
troponin 0.04

## 2023-04-03 NOTE — PROVIDER CONTACT NOTE (CRITICAL VALUE NOTIFICATION) - SITUATION
INR 6.45
Positive blood culture ESBL and Klebsiella
positive blood cultures, anaerobic bottle 4/1, gram negative rods
troponin 0.04
INR 6.15
hgb 6.8 hct 20.9
Pt A+Ox3, stepped up to ICU three days ago for GI bleed and possible sepsis. Pt no longer having bloody BMs and was restarted on Heparin today.
pt Hemoglobin is 6.0 and hematocrit is 19.4
pt hemoglobin is 6.8

## 2023-04-03 NOTE — CHART NOTE - NSCHARTNOTEFT_GEN_A_CORE
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  No edema noted at this time  skin tears/wounds noted, please see flow sheets for location   pressure ulcer: Sacrum/sacral spine stage Unstageable         Labs:       136  |  101  |  39<H>  ----------------------------<  122<H>  5.1   |  22  |  1.18    Ca    7.4<L>      2023 06:14  Phos  3.7     04-02  Mg     1.9     04-02      CAPILLARY BLOOD GLUCOSE          Medications:  MEDICATIONS  (STANDING):  chlorhexidine 2% Cloths 1 Application(s) Topical daily  pantoprazole  Injectable 40 milliGRAM(s) IV Push every 12 hours  sodium chloride 0.9% lock flush 10 milliLiter(s) IV Push every 8 hours    MEDICATIONS  (PRN):  HYDROmorphone  Injectable 0.5 milliGRAM(s) IV Push every 2 hours PRN Moderate Pain (4 - 6)  HYDROmorphone  Injectable 1 milliGRAM(s) IV Push every 2 hours PRN Severe Pain (7 - 10) or RR >22  LORazepam   Injectable 1 milliGRAM(s) IV Push every 4 hours PRN Anxiety/Agitation  ondansetron Injectable 4 milliGRAM(s) IV Push every 6 hours PRN Nausea and/or Vomiting  sodium chloride 0.9% lock flush 10 milliLiter(s) IV Push every 1 hour PRN Pre/post blood products, medications, blood draw, and to maintain line patency          Admitting Anthropometrics:  66''  pounds +-10%  Wt 142 pounds BMI 16.4 %IBW=66%   New adjusted IBW (w/ L AKA): 178lbs/ 81.3kg     Weight Change:    150.3 pounds (+Edema at this time)   2/15 141.7 pounds   141.9 pounds - dosing wt    136 pounds - Bedscale wt   2/15 142lbs    **PCM:  >> Reports having 1-2 meals/day + ONS. Per pt claims to have 2 ensure/day and 2 nutrament/day - unclear how accurate this is. ?If pt meeting ~75% EER consistently.  >> Does report wt loss.  pounds x6 months ago. Thinks he now is 135 pounds which is consistent with bedscale wt obtained during initial visit by  pounds. Suggest wt loss of 49 pounds/26% body wt loss.  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     Estimated energy needs:  ABW (65kg): for calculations as pt between % of IBW (80%)  Adjust for age, malnutrition, EF, S/p OR, Pressure ulcer; fluids per team  30-35kcal/k-2275kcal/day   1.4-1.6gm/k-104gm prot/day   **Rec upper end of needs     Subjective:   77M with PMHx of IVDU found unresponsive at his nursing home, resolved after Narcan in the field and brought to [facility]. Found to have PE, atrial flutter, and large LV thrombus on echo. Transferred to [facility] for further management. Pt c/o abdominal pain on 12/10 CTA showing mid SMA with embolus. Abnormal distal small bowel loops and cecum with dilatation and pneumatosis suggesting infarcted bowel. One or two tiny foci of intrahepatic portal vein pneumatosis. Segmental infarction upper pole left kidney. Now s/p Ex lap, SMA embolectomy, 80cm SBR, abthera vac left in discontinuity () and transferred to SICU intubated. S/p second look () and most recently s/p OR for 3rd look, end-to-end anastomosis of remaining bowel, loop ileostomy and abdomen closure (12/15). Transferred to CCU on  for cardiac optimization and now transferred back to SICU  for bleeding hemodynamic instability. Echo  shows EF 10-15% with overall hypokinesis. CTA performed demonstrating large hematoma in R abdomen, new hemoperitoneum, and bilateral pleural effusions. Remains in SICU, now extubated with still with limb ischemia to LLE pending amputation and AC held given BRBPR and hematoma; noted pt agreeable for AKA when feasible - AKA currently Pending Cards. Pt s/p DCCV on  (negative LORENZO on ) with successful conversion to NSR. Planning for AKA with vascular surgery once nutrition status is optimize. Team placed PICC 1/10 and initiated supplemental TPN now weaned off with constant encouragement of PO intake. CT A/P  showing RLQ fluid collection. PPN held / bacteremia. More recently found to have k. 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 PICC placed  now plan to restart TPN. S/p RTOR for L AKA closure 3/1. S/p ileostomy reversal (3/7). Pt with dark output from NGT 3/13 and transferred back to SICU. NG replaced 3/19. NGT output now clear from coffee ground 3/16. IR reconsulted for right lower abdominal wall collection per CT Scan 3/19. Now s/p IR drainage with 2 drains in the LUQ and 1 in the pelvis: 750 cc total output 3/20. NGT Removed 3/30, replaced . Transferred back to SICU for hemodynamic monitoring d/t septic shock . Given the lack of available antibiotic regimen and futility of reoperation, Palliative consulted for complex medical decision making in the setting of critical illness; HCP amenable to inpatient hospice transition.     On assessment, pt resting in bed. Spoke with RN. Pt had been NPO with NGT LWS during time of RD visit. Since time of visit made Comfort measures only/MEWS 4/3 and pt placed back on Regular Diet. Pt pending SDU to 7WO and hospice.  Please see RD Recs below.    Prior PES: Malnutrition Severe in Chronic state RT presumed intake<EER AEB NFPE, wt loss, PO intake  >>on going  Goal: Monitor for GOC- Honor at all times.    Recommendations:  Pt remains in SICU. Pt now with Comfort measures only/MEWS 4/3 and pt placed back on Regular Diet. Pts with goals of care identified as “Comfort Measures Only” as a Patient Care Order or documented in medical provider notes, can be determined at low complexity and follow-up by the dietitian by consult only. Defer use of PO diet to team Should PO diet be wished for in the setting of comfort feeds. RD to remain available for additional nutrition interventions as needed. Please reconsult PRN.     Education:  Deferred at this time    Risk Level: High [  ] Moderate [   ] Low [  X ].

## 2023-04-03 NOTE — PROGRESS NOTE ADULT - NS ATTEST RISK PROBLEM GEN_ALL_CORE FT
Febrile today ; Fever workup   monitoring electrolyte abnormalities in setting of malnutrition
Febrile today ; Fever workup   monitoring electrolyte abnormalities in setting of malnutrition
Acute Illness That Poses A Threat To Life  Prescription Of Parenteral Controlled Substances
Acute Illness That Poses A Threat To Life  Decision Made To De-escalate Care Due To Poor Prognosis  Prescription Of Parenteral Controlled Substances
Acute Illness That Poses A Threat To Life
Acute Illness That Poses A Threat To Life  Chronic Illness With Severe Exacerbation/Progression

## 2023-04-03 NOTE — PROGRESS NOTE ADULT - SUBJECTIVE AND OBJECTIVE BOX
STATUS POST:  Embolectomy, artery, superior mesenteric    Exploratory laparotomy    Exploratory laparotomy with small bowel resection    Small bowel resection with anastomosis    Closure of fascia of abdomen    Insertion, needle, vein    Amputation above knee    Complex reversal of ileostomy        POST OPERATIVE DAY #:     SUBJECTIVE:     Vital Signs Last 24 Hrs  T(C): 38.2 (03 Apr 2023 17:38), Max: 38.2 (03 Apr 2023 17:38)  T(F): 100.8 (03 Apr 2023 17:38), Max: 100.8 (03 Apr 2023 17:38)  HR: 115 (03 Apr 2023 20:00) (93 - 115)  BP: 63/42 (03 Apr 2023 20:00) (63/42 - 111/62)  BP(mean): 48 (03 Apr 2023 20:00) (48 - 81)  RR: 31 (03 Apr 2023 20:00) (14 - 31)  SpO2: 97% (03 Apr 2023 20:00) (90% - 100%)    Parameters below as of 03 Apr 2023 20:00  Patient On (Oxygen Delivery Method): room air        I&O's Summary    02 Apr 2023 07:01  -  03 Apr 2023 07:00  --------------------------------------------------------  IN: 1646.7 mL / OUT: 2143 mL / NET: -496.3 mL    03 Apr 2023 07:01  -  03 Apr 2023 22:29  --------------------------------------------------------  IN: 18.9 mL / OUT: 1150 mL / NET: -1131.1 mL        Physical Exam:  General Appearance: Appears well, NAD  Pulmonary: Nonlabored breathing, no respiratory distress  Cardiovascular: NSR  Abdomen: Soft, nondisteded, appropriate incisional tenderness, dressings clean and dry and intact  Extremities: WWP, SCD's in place     LABS:                        9.7    37.01 )-----------( 519      ( 02 Apr 2023 06:14 )             29.8     04-02    136  |  101  |  39<H>  ----------------------------<  122<H>  5.1   |  22  |  1.18    Ca    7.4<L>      02 Apr 2023 06:14  Phos  3.7     04-02  Mg     1.9     04-02

## 2023-04-03 NOTE — PROGRESS NOTE ADULT - PROBLEM SELECTOR PLAN 3
.  In the setting of critical illness and bowel ischemia  -no further TPN or tube feeds  -allow pleasure feeds as tolerated

## 2023-04-03 NOTE — DISCHARGE NOTE PROVIDER - DETAILS OF MALNUTRITION DIAGNOSIS/DIAGNOSES
This patient has been assessed with a concern for Malnutrition and was treated during this hospitalization for the following Nutrition diagnosis/diagnoses:     -  12/08/2022: Severe protein-calorie malnutrition   -  12/08/2022: Underweight (BMI < 19)

## 2023-04-03 NOTE — PROGRESS NOTE ADULT - ASSESSMENT
77M PMHx of IVDU found unresponsive at his nursing home, resolved after Narcan in the field and brought to [facility]. Found to have PE, atrial flutter, and large LV thrombus on echo. Transferred to [facility] for further management. Pt c/o abdominal pain on 12/10 CTA showing mid SMA with embolus. Abnormal distal small bowel loops and cecum with dilatation and pneumatosis suggesting infarcted bowel. Now s/p Ex lap, SMA embolectomy, 80cm SBR, abthera vac left in discontinuity (12/11). S/p second look (12/13), s/p OR for 3rd look, end-to-end anastomosis of remaining bowel, loop ileostomy and abdomen closure (12/15). S/p LORENZO and Cardioversion on 12/30 with cardiology. Patient was nutritionally optimized. 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 s/p L AKA closure (3/1), s/p ileostomy reversal (3/7) c/b ileus. Course c/b upper GI bleeding 3/13 w/ coffee ground emesis (resolved), sepsis 2/2 intra-abdominal abscesses. Now s/p IR drainage x2 in the LUQ and x1 in the pelvis (3/20). Back in SICU with septic shock.    Neuro: Comfort and Pain: Tylenol PRN, Dilaudid PRN, Ativan PRN for tachypnea. Nausea: Zofran PRN.  CV: Septic shock on levophed gtt now weaned. Repeat echo with EF (02/11) 55-60%. Mildly dilated RV. Maintain MAP >65. A.Fib/Flutter: s/p cardioversion on 12/30. Stopped metop and amio; hx HTN: holding spironolactone, Entresto. Stop Lovenox for AC.  Pulm: Comfortable on RA  GI/FEN: s/p ostomy reversal (3/7). Upper GI bleed (resolved): on PPI BID. SBO/ileus: Water and [place] for comfort purposes, NGT to LIWS to maintain decompression and prevent nausea. Stop TPN. Regular diet for comfort.  : GOLDIE secondary to septic shock oliguric.  Endo: mISS  Heme: LV thrombus w/ LLE ischemia: Lovenox 60 bid HELD.  Active T&S.  ID: Cont Nystatin powder to buttocks. Sepsis 2/2 intra-abdominal abscesses, s/p IR drains x3 (3/20) growing MDR Klebsiella pneumoniae, staph haemolyticus, VRE faecium, parabacteroides goldsteinii. LLE ischemia s/p L AKA 2/15 // DC: tigecycline (3/16-3/23), Caspo (3/14-3/23), Daptomycin 2/11-3/9; 3/10-16, 3/22-3/23), Zosyn, Fluconazole (3/9-13), Erta (2/13-3/10; 3/13), Tobra (3/13-16), Luis (2/10-12, 3/13-16), Vancomycin (2/10-2/11, 3/20-22). ID recommending d/c all abx - futile/no options given resistance  PPX: SCDs, Lovenox 60 bid HELD  L/D/T: PIV, LUQ drains x2, LLQ drain, RUQ x 1 (4 total)  PT/OT: Ordered  Dispo: SDU to 7WO and hospice  GOC: Pt is DNR DNI with poor prognosis given multi drug resistant organisms unable to be treated by antibiotics or drainage. Abdominal sepsis will be terminal event and pt/family aware and pursuing comfort measures. Discussed with family, surgery, and palliative care.

## 2023-04-03 NOTE — PROGRESS NOTE ADULT - PROBLEM SELECTOR PLAN 7
.  Complex medical decision making / symptom management in the setting of critical illness.    Will continue to follow for ongoing GOC discussion / titration of palliative regimen. Emotional support provided, questions answered.  Active Psychosocial Referrals:  [x]Social Work/Case management []PT/OT []Chaplaincy [x]Hospice  []Patient/Family Support []Holistic RN []Massage Therapy []Music Therapy []Ethics  Coping: [] well [x] with difficulty [] poor coping [] unable to assess  Support system: [] strong [x] adequate [] inadequate    For new or uncontrolled symptoms, please call Palliative Care at [phone]). The service is available 24/7 (including nights & weekends) to provide symptom management recommendations over the phone as appropriate

## 2023-04-03 NOTE — PROGRESS NOTE ADULT - ATTENDING SUPERVISION STATEMENT
Fellow
Fellow
Resident
Fellow
Fellow
Resident
Resident
Fellow
Resident
Fellow
Resident
Fellow
Resident
Resident
Fellow
Resident
Resident/Fellow
Resident
Student
Fellow
Resident
Resident
Resident/Fellow
Fellow
Resident

## 2023-04-03 NOTE — PROVIDER CONTACT NOTE (CRITICAL VALUE NOTIFICATION) - NAME OF MD/NP/PA/DO NOTIFIED:
Dr. Banegas
Dr. Hatch
Evita Mcneill NP
Leora Montes TEAM 4
Evita Mcneill NP
Team 4
MD Garcia
Dr. Steel
Dr. Varma
MD Arnold Espinal
Dr. Hatch
Kamron Ventura MD
[place]

## 2023-04-03 NOTE — PROGRESS NOTE ADULT - PROBLEM SELECTOR PLAN 4
.  In the setting of MDR intra-abdominal collections  -there has been extensive discussion that further IV Abx treatment will not provide meaningful benefit  -no further IVF given the potential to exacerbate heart failure  -no further pressors given the decision to allow a natural disease process to unfold

## 2023-04-03 NOTE — PROGRESS NOTE ADULT - PROBLEM SELECTOR PLAN 1
.  -Dilaudid 0.5mg IV q2h PRN for Moderate Pain  -Dilaudid 1mg q2h PRN for Severe Pain or RR >22  -Ativan 1mg IV q4h PRN for Anxiety/Agitation  -Zofran 4mg IV q6h PRN for N/V

## 2023-04-03 NOTE — DISCHARGE NOTE FOR THE EXPIRED PATIENT - HOSPITAL COURSE
The patient is a 77 year old male with past medical history of IV drug use who was found unresponsive at his nursing home which resolved after Narcan and brought to [facility] he was found to have pulmonary embolism, atrial flutter and a large left ventricular thrombus on Echo who is transferred to [facility] for continued management. The patient was initially admitted to the cardiology service with plans for possible ablation and management of a flutter and thromboembolic disease however patient subsequently developed left lower extremity pain with absent pulses for which vascular surgery was consulted for ischemic leg with plans for left lower extremity angiogram. While in the CCU the patient subsequently developed abdominal pain and what was thought to be diarrhea secondary to opioid withdrawal, but he soon had a large bloody bowel movement. CTA was urgently performed showing thrombus in the mid to distal SMA and inflammatory thickening of small bowel loops concerning for ischemic enteritis. General surgery was consulted for operative assistance in the setting of ischemic bowel. The patient was taken to the OR on  for an ex-lap, SMA embolectomy and small bowel resection with resection of 80 cm of small bowel. An abthera vac was placed with plan to return to OR and the patient was taken back two days later for a second look and resection of 40 cm of small bowel. A third look was done two days later with end-to-end anastomosis of remaining bowel, loop ileostomy and abdomen closure . On  a LORENZO was performed and patient was cardioverted with cardiology. 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 His hospital course was further complicated by fevers of unknown origin, intraabdominal fluid collections, malnutrition and high ileostomy output requiring twice a day repletions. Fevers were then attributed to intraabdominal collections, not amenable to percutaneous drainage and with multiple negative blood cultures the decision was made to continue broad spectrum antibiotics, picc/tpn, and serial blood cultures to monitor development of bacteremia. After several days of remaining afebrile, the patient was brought back to the OR on 3/7 for ileostomy reversal and drainage of intra-abdominal collection. Following this return to OR, the patient's course was then complicated by tachycardia, post operative ileus with NGT placement, and upper GI bleeding with coffee ground emesis on 3/13 and transfer to SICU. Patient was resuscitation with 2u pRBCs and Abx were escalated to Meropenem, Caspofungin, and Tobramycin and soon to only Tigecycline per ID. Interval CTAP on 3/19 demonstrated increased ascites and enlarged peritoneal fluid and gas collections including large new left upper quadrant collection. Patient underwent IR placement of 2 LUQ drains and 1 pelvic drain. Vancomycin was started. Patient continued to have fevers. IR Cultures were seen to grow Klebsiella pneumoniae, staph haemolyticus, e faecium, parabacteroides goldsteinii. Daptomycin started per ID. The patient continued to have fevers and on 3/23, ID recommended discontinuing all antibiotics given patient had pan-resistant microbes. Palliative was consulted for the patient. Repeat CTAP showed improvement of pelvic and LUQ collections, new RUQ collection with air, trace pneumoperitoneum and possible leak vs perf (no extravasation). New IR drain was placed in retrohepatic collection, and LUQ drains were upsized. Patient was stepped down to telemetry on 3/27, however on  the patient began having increased distension, multiple episodes of emesis s/p placement of NGT, emesis around NGT, SBPs to 80s with subsequent 2L bolus on pressure bag and Tmax 102.7. He was stepped up to SICU for septic shock. Palliative was reconsulted. On , pressors were capped, no role for IR. TPN was discontinued and Patient given regular diet for comfort.  He  at 10:22pm on 4/3, his sister Sophie was called and differed Autopsy.

## 2023-04-03 NOTE — PROGRESS NOTE ADULT - SUBJECTIVE AND OBJECTIVE BOX
Pt seen and examined. All drains flushed with 5 cc of sterile saline without resistance. Dressing c/d.    - LUQ anterior (white) OLGA#1 with 5 cc of  serosanguinous output over 24 hrs. 12 cc during the prior 24 hrs.  -LUQ lateral (green) OLGA #2 with 5 of serosanguinous output over 24 hrs. 12cc during the prior 24 hrs.   -Pelvic drain OLGA #3 with 9 cc of purulent output over 24 hrs. 28 cc during the prior 24 hrs.  -RUQ subhepatic drain OLGA #4 with 45 cc of thick purulent output over 24 hrs. Previously 30 cc prior 24 hr period

## 2023-04-03 NOTE — PROGRESS NOTE ADULT - SUBJECTIVE AND OBJECTIVE BOX
[facility]  Carlos Santos, Palliative Care Attending  Contact Info: Call [phone] (HEAL Line) or message on [employer] Teams (Carlos Santos)    SUBJECTIVE AND OBJECTIVE:  INTERVAL HPI/OVERNIGHT EVENTS: Interval events noted. Patient resting. Received 3 PRNs of Dilaudid in past 24hrs, as noted below. Comprehensive symptom assessment and GOC exploration as noted below. Extensive time spent discussing plan of care with patient's sister. No unexpected adverse effects of opiates noted: no sedation/dizziness/nausea. Requesting no further aggressive/invasive care, allow a natural disease process to unfold.    ALLERGIES:  No Known Allergies    MEDICATIONS  (STANDING):  chlorhexidine 2% Cloths 1 Application(s) Topical daily  pantoprazole  Injectable 40 milliGRAM(s) IV Push every 12 hours  sodium chloride 0.9% lock flush 10 milliLiter(s) IV Push every 8 hours    MEDICATIONS  (PRN):  HYDROmorphone  Injectable 0.5 milliGRAM(s) IV Push every 3 hours PRN Moderate Pain (4 - 6)  HYDROmorphone  Injectable 1 milliGRAM(s) IV Push every 3 hours PRN Severe Pain (7 - 10)  LORazepam   Injectable 1 milliGRAM(s) IV Push every 3 hours PRN tachypnea >22 or discomfort  ondansetron Injectable 4 milliGRAM(s) IV Push every 6 hours PRN Nausea and/or Vomiting  sodium chloride 0.9% lock flush 10 milliLiter(s) IV Push every 1 hour PRN Pre/post blood products, medications, blood draw, and to maintain line patency    Analgesic Use (Scheduled and PRNs) for past 24 hours:  acetaminophen   IVPB ..   400 mL/Hr IV Intermittent (04-02-23 @ 19:20)  HYDROmorphone  Injectable   0.5 milliGRAM(s) IV Push (04-03-23 @ 05:42)   0.5 milliGRAM(s) IV Push (04-02-23 @ 23:35)   0.5 milliGRAM(s) IV Push (04-02-23 @ 15:23)    ITEMS UNCHECKED ARE NOT PRESENT  PRESENT SYMPTOMS/REVIEW OF SYSTEMS: [x]Unable to obtain due to poor mentation   Source if other than patient:  []Family   [x]Team     Pain: [x] yes [] no  QOL impact -  Location - abdomen  Aggravating factors -  Quality -  Radiation -  Timing - intermittent  Severity (0-10 scale) -  Minimal acceptable level (0-10 scale) -    PAINAD Score: 2  CPOT Score: 1    Dyspnea:                           []Mild  []Moderate []Severe  Anxiety:                             []Mild []Moderate []Severe  Fatigue:                             []Mild []Moderate []Severe  Nausea:                             []Mild []Moderate []Severe  Loss of appetite:              []Mild []Moderate []Severe  Constipation:                    []Mild []Moderate []Severe    Other Symptoms:  [x]All other review of systems negative     Palliative Performance Status Version 2: 20%    GENERAL:  [] NAD []Alert [x]Lethargic  []Cachexia  []Unarousable  [x]Verbal  []Non-Verbal  BEHAVIORAL:   []Anxiety  [x]Delirium []Agitation []Cooperative []Oriented x  HEENT:  [x]Normal  [] Moist Mucous Membranes [x]Dry mouth   []ET Tube/Trach  []Oral lesions  PULMONARY:   [x]Clear []Tachypnea  []Audible excessive secretions  [x]Normal Work of Breathing []Labored Breathing  []Rhonchi []Crackles []Wheezing  CARDIOVASCULAR:    [x]Regular Rate []Regular Rhythm [x]Irregular []Tachy  []Lele  GASTROINTESTINAL:  []Soft  [x]Distended   [x]+BS  []Non tender [x]Tender  []PEG [x]OGT/ NGT  Last BM:  GENITOURINARY:  []Normal [] Incontinent   []Oliguria/Anuria   [x]Uriarte  MUSCULOSKELETAL:   []Normal Extremities  [x]Weakness  [x]Bed/Wheelchair bound []Edema  NEUROLOGIC:   []No focal deficits  []Cognitive impairment  [x]Dysphagia []Dysarthria []Paresis [x]Encephalopathic  SKIN:   []Normal   [x]Pressure ulcer(s)  []Rash    Vital Signs Last 24 Hrs  T(C): 37.3 (03 Apr 2023 09:00), Max: 38.3 (02 Apr 2023 19:00)  T(F): 99.1 (03 Apr 2023 09:00), Max: 101 (02 Apr 2023 19:00)  HR: 95 (03 Apr 2023 08:00) (93 - 112)  BP: 98/59 (03 Apr 2023 08:00) (64/37 - 138/62)  BP(mean): 73 (03 Apr 2023 08:00) (45 - 89)  RR: 16 (03 Apr 2023 08:00) (13 - 23)  SpO2: 100% (03 Apr 2023 08:00) (92% - 100%)    Parameters below as of 03 Apr 2023 08:00  Patient On (Oxygen Delivery Method): room air    LABS:                         9.7    37.01 )-----------( 519      ( 02 Apr 2023 06:14 )             29.8   04-02    136  |  101  |  39<H>  ----------------------------<  122<H>  5.1   |  22  |  1.18    Ca    7.4<L>      02 Apr 2023 06:14  Phos  3.7     04-02  Mg     1.9     04-02    RADIOLOGY & ADDITIONAL STUDIES: None new    DISCUSSION OF CASE: Sister - to provide updates and emotional support; SICU - to discuss plan of care

## 2023-04-03 NOTE — PROGRESS NOTE ADULT - ASSESSMENT
. 77M PMHx of IVDU found unresponsive at his nursing home, resolved after Narcan in the field and brought to [facility]. Found to have PE, atrial flutter, and large LV thrombus on echo. Transferred to [facility] for further management. Pt c/o abdominal pain on 12/10 CTA showing mid SMA with embolus. Abnormal distal small bowel loops and cecum with dilatation and pneumatosis suggesting infarcted bowel. Now s/p Ex lap, SMA embolectomy, 80cm SBR, abthera vac left in discontinuity (12/11). S/p second look (12/13), s/p OR for 3rd look, end-to-end anastomosis of remaining bowel, loop ileostomy and abdomen closure (12/15). s/p ileostomy reversal. Patient is now s/p upsizing of the two LUQ drains on 3/24 to 14 Fr (white) and 16 Fr (green) as well as placement of the RUQ drain on 3/24. Patient still with the LUQ drain from 3/20.    - monitor output q8h  - care per primary team    IR will continue to follow

## 2023-04-03 NOTE — PROGRESS NOTE ADULT - PROBLEM SELECTOR PLAN 2
.  PPSV = 30%, requires total assistance for all ADLs  -c/w supportive care, turning and positioning

## 2023-04-03 NOTE — DISCHARGE NOTE PROVIDER - HOSPITAL COURSE
77M PMHx of IVDU found unresponsive at his nursing home, resolved after Narcan in the field and brought to [facility]. Found to have PE, atrial flutter, and large LV thrombus on echo. Transferred to [facility] for further management. Pt c/o abdominal pain on 12/10 CTA showing mid SMA with embolus. Abnormal distal small bowel loops and cecum with dilatation and pneumatosis suggesting infarcted bowel. Now s/p Ex lap, SMA embolectomy, 80cm SBR, abthera vac left in discontinuity (12/11). S/p second look (12/13), s/p OR for 3rd look, end-to-end anastomosis of remaining bowel, loop ileostomy and abdomen closure (12/15). S/p LORENZO and Cardioversion on 12/30 with cardiology. Patient was nutritionally optimized. 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 s/p L AKA closure (3/1), s/p ileostomy reversal (3/7) c/b ileus. Course c/b upper GI bleeding 3/13 w/ coffee ground emesis (resolved), sepsis 2/2 intra-abdominal abscesses. Now s/p IR drainage x2 in the LUQ and x1 in the pelvis (3/20). Back in SICU with septic shock. epsis 2/2 intra-abdominal abscesses. Now s/p IR drainage x2 in the LUQ and x1 in the pelvis (3/20). Back in SICU with septic shock.    The patient is a 77 year old male with past medical history of IV drug use who was found unresponsive at his nursing home which resolved after Narcan and brought to [facility] he was found to have pulmonary embolism, atrial flutter and a large left ventricular thrombus on Echo who is transferred to [facility] for continued management. The patient was initially admitted to the cardiology service with plans for possible ablation and management of a flutter and thromboembolic disease however patient subsequently developed left lower extremity pain with absent pulses for which vascular surgery was consulted for ischemic leg with plans for left lower extremity angiogram. While in the CCU the patient subsequently developed abdominal pain and what was thought to be diarrhea secondary to opioid withdrawal, but he soon had a large bloody bowel movement. CTA was urgently performed showing thrombus in the mid to distal SMA and inflammatory thickening of small bowel loops concerning for ischemic enteritis. General surgery was consulted for operative assistance in the setting of ischemic bowel. The patient was taken to the OR on 12/11 for an ex-lap, SMA embolectomy and small bowel resection with resection of 80 cm of small bowel. An abthera vac was placed with plan to return to OR and the patient was taken back two days later for a second look and resection of 40 cm of small bowel. A third look was done two days later with end-to-end anastomosis of remaining bowel, loop ileostomy and abdomen closure . On 12/30 a LORENZO was performed and patient was cardioverted with cardiology. His hospital course remained complicated with the patient requiring ICU level care several times through his course; Ischemic left leg with initially dry gangrene conversion to wet and subsequent left guillotine above knee amputation on 2/15 with completion and closure on 3/1. His hospital course was further complicated by fevers of unknown origin, intraabdominal fluid collections, malnutrition and high ileostomy output requiring twice a day repletions. Fevers were then attributed to intraabdominal collections, not amenable to percutaneous drainage and with multiple negative blood cultures the decision was made to continue broad spectrum antibiotics, picc/tpn, and serial blood cultures to monitor development of bacteremia. After several days of remaining afebrile, the patient was brought back to the OR on 3/7 for ileostomy reversal and drainage of intra-abdominal collection. Following this return to OR, the patient's course was then complicated by tachycardia, post operative ileus with NGT placement, and upper GI bleeding with coffee ground emesis on 3/13 and transfer to SICU. Patient was resuscitation with 2u pRBCs and Abx were escalated to Meropenem, Caspofungin, and Tobramycin and soon to only Tigecycline per ID. Interval CTAP on 3/19 demonstrated increased ascites and enlarged peritoneal fluid and gas collections including large new left upper quadrant collection. Patient underwent IR placement of 2 LUQ drains and 1 pelvic drain. Vancomycin         ON: CT showed improved pelvic and LUQ collection, new RUQ collection w/ air, trace pneumoperitoneum poss leak vs perf (no extrav), "dying wish" to have NGT out and have some juice, Yannick wants NGT in until confirmed GOC w/ hospice plan  3/23: PTT 83, cont. hep gtt @16. TPN. ID recommending d/c all abx - futile/no options given resistance, Palliative saw pt today, ordered CT with IV/PO contrast and rectal. Abx discontinued. Febrile 102.2  ON: PTT 80. Febrile Tmax 101.3.   3/22: PTT 66, cont @16, Vanc 8, received 1000 Vanc in AM then DC Vanc. no repeat bl cx TPN reordered. Noon PTT 64, OR Cx: IR Cx Klebsiella pneumoniae, staph haemolyticus, e faecium, parabacteroides goldsteinii. Dapto 400 started per ID, CK 41. Tmax 102, ofirmev.   ON: Fever 102.4, given tylenol. PTT 55 --> Hep gtt incr to 16.   3/21: LLQ drain feculent, LUQ drains x2 bilious, Octreotide added to TPN. Heparin gtt restarted at 14. ReC/S Palliative: continue course. PTT 48, incr to 15.   ON: added on vanc per ID, productive cough of clear mucus, CXR clear, LLQ purulent, LUQ drains bilious, temp 101.4, ofirmev x2, weaned NC  3/20: reordered TPN, WBC increasing, holding hep gtt for IR, restarted amio @0.5, IR placed 3 drain - 2 in LUQ (200cc and 150cc slightly blood) and 1 in the pelvis - 400cc (purulent/bilious?), BP soft- gave album 5% 250. Repeat BCx sent. The patient is a 77 year old male with past medical history of IV drug use who was found unresponsive at his nursing home which resolved after Narcan and brought to [facility] he was found to have pulmonary embolism, atrial flutter and a large left ventricular thrombus on Echo who is transferred to [facility] for continued management. The patient was initially admitted to the cardiology service with plans for possible ablation and management of a flutter and thromboembolic disease however patient subsequently developed left lower extremity pain with absent pulses for which vascular surgery was consulted for ischemic leg with plans for left lower extremity angiogram. While in the CCU the patient subsequently developed abdominal pain and what was thought to be diarrhea secondary to opioid withdrawal, but he soon had a large bloody bowel movement. CTA was urgently performed showing thrombus in the mid to distal SMA and inflammatory thickening of small bowel loops concerning for ischemic enteritis. General surgery was consulted for operative assistance in the setting of ischemic bowel. The patient was taken to the OR on 12/11 for an ex-lap, SMA embolectomy and small bowel resection with resection of 80 cm of small bowel. An abthera vac was placed with plan to return to OR and the patient was taken back two days later for a second look and resection of 40 cm of small bowel. A third look was done two days later with end-to-end anastomosis of remaining bowel, loop ileostomy and abdomen closure . On 12/30 a LORENZO was performed and patient was cardioverted with cardiology. 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 His hospital course was further complicated by fevers of unknown origin, intraabdominal fluid collections, malnutrition and high ileostomy output requiring twice a day repletions. Fevers were then attributed to intraabdominal collections, not amenable to percutaneous drainage and with multiple negative blood cultures the decision was made to continue broad spectrum antibiotics, picc/tpn, and serial blood cultures to monitor development of bacteremia. After several days of remaining afebrile, the patient was brought back to the OR on 3/7 for ileostomy reversal and drainage of intra-abdominal collection. Following this return to OR, the patient's course was then complicated by tachycardia, post operative ileus with NGT placement, and upper GI bleeding with coffee ground emesis on 3/13 and transfer to SICU. Patient was resuscitation with 2u pRBCs and Abx were escalated to Meropenem, Caspofungin, and Tobramycin and soon to only Tigecycline per ID. Interval CTAP on 3/19 demonstrated increased ascites and enlarged peritoneal fluid and gas collections including large new left upper quadrant collection. Patient underwent IR placement of 2 LUQ drains and 1 pelvic drain. Vancomycin was started. Patient continued to have fevers. IR Cultures were seen to grow Klebsiella pneumoniae, staph haemolyticus, e faecium, parabacteroides goldsteinii. Daptomycin started per ID. The patient continued to have fevers and on 3/23, ID recommended discontinuing all antibiotics given patient had pan-resistant microbes. Palliative was consulted for the patient. Repeat CTAP showed improvement of pelvic and LUQ collections, new RUQ collection with air, trace pneumoperitoneum and possible leak vs perf (no extravasation). New IR drain was placed in retrohepatic collection, and LUQ drains were upsized. Patient was stepped down to telemetry on 3/27, however on 4/1 the patient began having increased distension, multiple episodes of emesis s/p placement of NGT, emesis around NGT, SBPs to 80s with subsequent 2L bolus on pressure bag and Tmax 102.7. He was stepped up to SICU for septic shock. Palliative was reconsulted. On 4/2, pressors were capped, no role for IR. TPN was discontinued. Patient given regular diet for comfort.

## 2023-04-03 NOTE — PROGRESS NOTE ADULT - PROBLEM SELECTOR PROBLEM 2
Acute systolic congestive heart failure
Atrial flutter
Atrial flutter
Acute systolic congestive heart failure
Severe protein-calorie malnutrition
Debility
Atrial flutter
Severe protein-calorie malnutrition
Functional quadriplegia
Debility

## 2023-04-03 NOTE — PROGRESS NOTE ADULT - ATTENDING COMMENTS
Last week, patient had undergone revision of his IR drains after CT imaging demonstrated improvement in some collections, and formation of new intraabdominal collections. Following revision by IR, he did well over the course of the week with improvement in his tachycardia, he was overall afebrile, hemodynamically stable, abdomen soft, mild distension. His drains were consistently putting out serosanguinous and intermittent purulent material, without evidence of hira bilious or feculent output. Given these findings, it was felt that he had a resolving/controlled leak potentially from his proximal anastomosis and was being managed appropriately with bowel rest, NG tube decompression, TPN, and ongoing supportive care. We had discussed several times amongst himself as well as his sister that given his multiple previous abdominal operations and associated prolonged post-operative course, we would continue with ongoing management, however should the need arise for consideration of return to the OR for abdominal exploration, this would be extremely morbid and he would be at high risk for further complication including bowel injury, bleeding, abscess, uncontrolled leak, prolonged respiratory failure, prolonged ICU course, venous thromboembolism, and death. He and his sister did express that operative intervention would not be in accordance with his wishes, and were he to decompensate or fail management with ongoing percutaneous drainage and supportive care, he would like to pursue comfort care measures.    Over the course of this past weekend, he did unfortunately develop recurrent fever, tachycardia, and hypotension concerning for sepsis due to intraabdominal infection, likely due to an ongoing enteric leak that was not contained or controlled with percutaneous drains. He returned to the SICU for resuscitation, supportive care, and repeat imaging to determine etiology of his decompensation. On CT, he was noted to have again persistent intraabdominal collections as well as concern for pooling of contrast material consistent with uncontrolled leak. Given these findings for ongoing intraabdominal infection with multi drug resistant bacteria not amenable to control with percutaneous drainage and antibiotics, decision was made to pursue with palliative and comfort care measures. I again spoke at length with the patient and his sister regarding his current clinical status, his poor prognosis, the risks associated with more aggressive measures such as operative intervention, and pursuing further attempts at revising his drains. Patient and sister in agreement with pursuing comfort care measures at this time.

## 2023-04-03 NOTE — PROGRESS NOTE ADULT - PROBLEM SELECTOR PLAN 6
.  Patient is DNR/DNI, CHARLENE in chart  -patient would benefit from inpatient hospice for management of pain and nausea at end of life due to bowel ischemia  -sister (Sophie Mayers, [phone]) is designated HCP    In addition to the E/M visit, an advance care planning meeting was performed. Start time: 11:30AM; End time: 11:46AM; Total time: min. A face to face meeting to discuss advance care planning was held today regarding: OLIVIA MAYERS. Primary decision maker: Patient is unable to participate in decision making; Alternate/surrogate: Sophie Mayers. Discussed advance directives including, but not limited to, healthcare proxy and code status. Decision regarding code status: DNR/DNI; Documentation completed today: Hospice Referral [facility] note

## 2023-04-06 LAB
-  MEROPENEM: SIGNIFICANT CHANGE UP
CULTURE RESULTS: SIGNIFICANT CHANGE UP
METHOD TYPE: SIGNIFICANT CHANGE UP
SPECIMEN SOURCE: SIGNIFICANT CHANGE UP

## 2023-04-08 LAB
-  AMOXICILLIN/CLAVULANIC ACID: SIGNIFICANT CHANGE UP
-  CLINDAMYCIN: SIGNIFICANT CHANGE UP
-  ERTAPENEM: SIGNIFICANT CHANGE UP
-  METRONIDAZOLE: SIGNIFICANT CHANGE UP

## 2023-04-17 LAB
CULTURE RESULTS: SIGNIFICANT CHANGE UP
ORGANISM # SPEC MICROSCOPIC CNT: SIGNIFICANT CHANGE UP
SPECIMEN SOURCE: SIGNIFICANT CHANGE UP

## 2023-07-18 NOTE — PROGRESS NOTE ADULT - SUBJECTIVE AND OBJECTIVE BOX
SUBJECTIVE: Pt seen and examined at bedside this am. Denies any pain    MEDICATIONS  (STANDING):  chlorhexidine 2% Cloths 1 Application(s) Topical <User Schedule>  dextrose 5%. 1000 milliLiter(s) (50 mL/Hr) IV Continuous <Continuous>  dextrose 5%. 1000 milliLiter(s) (100 mL/Hr) IV Continuous <Continuous>  digoxin  Injectable 125 MICROGram(s) IV Push every 24 hours  dronabinol 5 milliGRAM(s) Oral every 12 hours  glucagon  Injectable 1 milliGRAM(s) IntraMuscular once  heparin  Infusion 1200 Unit(s)/Hr (13 mL/Hr) IV Continuous <Continuous>  influenza  Vaccine (HIGH DOSE) 0.7 milliLiter(s) IntraMuscular once  insulin lispro (ADMELOG) corrective regimen sliding scale   SubCutaneous every 6 hours  loperamide 4 milliGRAM(s) Oral three times a day  metoprolol tartrate 12.5 milliGRAM(s) Oral every 12 hours  pantoprazole  Injectable 40 milliGRAM(s) IV Push two times a day  phenylephrine    Infusion 0.1 MICROgram(s)/kG/Min (2.42 mL/Hr) IV Continuous <Continuous>  psyllium Powder 1 Packet(s) Oral every 12 hours  silver sulfADIAZINE 1% Cream 1 Application(s) Topical daily    MEDICATIONS  (PRN):  acetaminophen     Tablet .. 650 milliGRAM(s) Oral every 6 hours PRN Temp greater or equal to 38C (100.4F), Mild Pain (1 - 3)  dextrose Oral Gel 15 Gram(s) Oral once PRN Blood Glucose LESS THAN 70 milliGRAM(s)/deciliter      Vital Signs Last 24 Hrs  T(C): 37.2 (30 Dec 2022 05:17), Max: 37.2 (30 Dec 2022 05:17)  T(F): 98.9 (30 Dec 2022 05:17), Max: 98.9 (30 Dec 2022 05:17)  HR: 114 (30 Dec 2022 07:00) (86 - 123)  BP: 108/63 (30 Dec 2022 07:00) (102/57 - 151/91)  BP(mean): 81 (30 Dec 2022 07:00) (76 - 114)  RR: 12 (30 Dec 2022 07:00) (11 - 23)  SpO2: 94% (30 Dec 2022 07:00) (93% - 100%)    Parameters below as of 30 Dec 2022 07:00  Patient On (Oxygen Delivery Method): room air        Physical Exam  General: NAD, resting comfortably in bed  Pulmonary: Nonlabored breathing, no respiratory distress  Cardiovascular: tachycardic, regular.   Abd: soft, ileostomy pink, ostomy bag with brown liquid output, midline incision with staples  Extremities: Toes of LLE w/ dry gangrene. Large bullae of anterior and lateral foot, blisters of anterior left calf have opened, dressed in kerlix with serous drainage from the desquamated patches. Diffuse mild edema of LLE. LLE cool. No signs of infection or wet gangrene. No L DP/PT signals      I&O's Detail    29 Dec 2022 07:01  -  30 Dec 2022 07:00  --------------------------------------------------------  IN:    Heparin: 286 mL    IV PiggyBack: 25 mL    IV PiggyBack: 999 mL    IV PiggyBack: 300 mL    Oral Fluid: 360 mL  Total IN: 1970 mL    OUT:    Ileostomy (mL): 1800 mL    Phenylephrine: 0 mL    Voided (mL): 1150 mL  Total OUT: 2950 mL    Total NET: -980 mL          LABS:                        9.8    5.03  )-----------( 300      ( 30 Dec 2022 05:29 )             29.6     12-30    132<L>  |  100  |  11  ----------------------------<  96  4.1   |  26  |  0.80    Ca    8.1<L>      30 Dec 2022 05:29  Phos  2.6     12-30  Mg     2.0     12-30    TPro  5.7<L>  /  Alb  2.8<L>  /  TBili  1.9<H>  /  DBili  x   /  AST  27  /  ALT  25  /  AlkPhos  105  12-30    PT/INR - ( 30 Dec 2022 05:29 )   PT: 15.8 sec;   INR: 1.32          PTT - ( 30 Dec 2022 05:29 )  PTT:68.6 sec      RADIOLOGY & ADDITIONAL STUDIES: Cimzia Pregnancy And Lactation Text: This medication crosses the placenta but can be considered safe in certain situations. Cimzia may be excreted in breast milk.

## 2023-07-20 PROCEDURE — 82525 ASSAY OF COPPER: CPT

## 2023-07-20 PROCEDURE — 84207 ASSAY OF VITAMIN B-6: CPT

## 2023-07-20 PROCEDURE — 82131 AMINO ACIDS SINGLE QUANT: CPT

## 2023-07-20 PROCEDURE — 84540 ASSAY OF URINE/UREA-N: CPT

## 2023-07-20 PROCEDURE — 82570 ASSAY OF URINE CREATININE: CPT

## 2023-07-20 PROCEDURE — 71045 X-RAY EXAM CHEST 1 VIEW: CPT

## 2023-07-20 PROCEDURE — 85613 RUSSELL VIPER VENOM DILUTED: CPT

## 2023-07-20 PROCEDURE — 36415 COLL VENOUS BLD VENIPUNCTURE: CPT

## 2023-07-20 PROCEDURE — C1769: CPT

## 2023-07-20 PROCEDURE — 82040 ASSAY OF SERUM ALBUMIN: CPT

## 2023-07-20 PROCEDURE — 84484 ASSAY OF TROPONIN QUANT: CPT

## 2023-07-20 PROCEDURE — 76080 X-RAY EXAM OF FISTULA: CPT

## 2023-07-20 PROCEDURE — 80048 BASIC METABOLIC PNL TOTAL CA: CPT

## 2023-07-20 PROCEDURE — 94640 AIRWAY INHALATION TREATMENT: CPT

## 2023-07-20 PROCEDURE — 87181 SC STD AGAR DILUTION PER AGT: CPT

## 2023-07-20 PROCEDURE — 85598 HEXAGNAL PHOSPH PLTLT NEUTRL: CPT

## 2023-07-20 PROCEDURE — 82247 BILIRUBIN TOTAL: CPT

## 2023-07-20 PROCEDURE — 85027 COMPLETE CBC AUTOMATED: CPT

## 2023-07-20 PROCEDURE — 85610 PROTHROMBIN TIME: CPT

## 2023-07-20 PROCEDURE — 99153 MOD SED SAME PHYS/QHP EA: CPT

## 2023-07-20 PROCEDURE — 86708 HEPATITIS A ANTIBODY: CPT

## 2023-07-20 PROCEDURE — 82728 ASSAY OF FERRITIN: CPT

## 2023-07-20 PROCEDURE — 88304 TISSUE EXAM BY PATHOLOGIST: CPT

## 2023-07-20 PROCEDURE — 85384 FIBRINOGEN ACTIVITY: CPT

## 2023-07-20 PROCEDURE — 76770 US EXAM ABDO BACK WALL COMP: CPT

## 2023-07-20 PROCEDURE — 83010 ASSAY OF HAPTOGLOBIN QUANT: CPT

## 2023-07-20 PROCEDURE — 82553 CREATINE MB FRACTION: CPT

## 2023-07-20 PROCEDURE — 86900 BLOOD TYPING SEROLOGIC ABO: CPT

## 2023-07-20 PROCEDURE — 83036 HEMOGLOBIN GLYCOSYLATED A1C: CPT

## 2023-07-20 PROCEDURE — 88307 TISSUE EXAM BY PATHOLOGIST: CPT

## 2023-07-20 PROCEDURE — 83690 ASSAY OF LIPASE: CPT

## 2023-07-20 PROCEDURE — 97535 SELF CARE MNGMENT TRAINING: CPT

## 2023-07-20 PROCEDURE — 82607 VITAMIN B-12: CPT

## 2023-07-20 PROCEDURE — 86704 HEP B CORE ANTIBODY TOTAL: CPT

## 2023-07-20 PROCEDURE — 82977 ASSAY OF GGT: CPT

## 2023-07-20 PROCEDURE — 87075 CULTR BACTERIA EXCEPT BLOOD: CPT

## 2023-07-20 PROCEDURE — 74176 CT ABD & PELVIS W/O CONTRAST: CPT

## 2023-07-20 PROCEDURE — 93925 LOWER EXTREMITY STUDY: CPT

## 2023-07-20 PROCEDURE — 84105 ASSAY OF URINE PHOSPHORUS: CPT

## 2023-07-20 PROCEDURE — 84100 ASSAY OF PHOSPHORUS: CPT

## 2023-07-20 PROCEDURE — 83550 IRON BINDING TEST: CPT

## 2023-07-20 PROCEDURE — 85025 COMPLETE CBC W/AUTO DIFF WBC: CPT

## 2023-07-20 PROCEDURE — 97168 OT RE-EVAL EST PLAN CARE: CPT

## 2023-07-20 PROCEDURE — 80200 ASSAY OF TOBRAMYCIN: CPT

## 2023-07-20 PROCEDURE — 74019 RADEX ABDOMEN 2 VIEWS: CPT

## 2023-07-20 PROCEDURE — 36430 TRANSFUSION BLD/BLD COMPNT: CPT

## 2023-07-20 PROCEDURE — 74177 CT ABD & PELVIS W/CONTRAST: CPT

## 2023-07-20 PROCEDURE — 86140 C-REACTIVE PROTEIN: CPT

## 2023-07-20 PROCEDURE — 97163 PT EVAL HIGH COMPLEX 45 MIN: CPT

## 2023-07-20 PROCEDURE — 81001 URINALYSIS AUTO W/SCOPE: CPT

## 2023-07-20 PROCEDURE — 87070 CULTURE OTHR SPECIMN AEROBIC: CPT

## 2023-07-20 PROCEDURE — 97164 PT RE-EVAL EST PLAN CARE: CPT

## 2023-07-20 PROCEDURE — 85045 AUTOMATED RETICULOCYTE COUNT: CPT

## 2023-07-20 PROCEDURE — 71260 CT THORAX DX C+: CPT

## 2023-07-20 PROCEDURE — 86146 BETA-2 GLYCOPROTEIN ANTIBODY: CPT

## 2023-07-20 PROCEDURE — 82550 ASSAY OF CK (CPK): CPT

## 2023-07-20 PROCEDURE — 85347 COAGULATION TIME ACTIVATED: CPT

## 2023-07-20 PROCEDURE — 49406 IMAGE CATH FLUID PERI/RETRO: CPT | Mod: 59

## 2023-07-20 PROCEDURE — 87449 NOS EACH ORGANISM AG IA: CPT

## 2023-07-20 PROCEDURE — 75984 XRAY CONTROL CATHETER CHANGE: CPT

## 2023-07-20 PROCEDURE — 84436 ASSAY OF TOTAL THYROXINE: CPT

## 2023-07-20 PROCEDURE — 87521 HEPATITIS C PROBE&RVRS TRNSC: CPT

## 2023-07-20 PROCEDURE — 84145 PROCALCITONIN (PCT): CPT

## 2023-07-20 PROCEDURE — C1894: CPT

## 2023-07-20 PROCEDURE — 87150 DNA/RNA AMPLIFIED PROBE: CPT

## 2023-07-20 PROCEDURE — 83986 ASSAY PH BODY FLUID NOS: CPT

## 2023-07-20 PROCEDURE — 0225U NFCT DS DNA&RNA 21 SARSCOV2: CPT

## 2023-07-20 PROCEDURE — 82340 ASSAY OF CALCIUM IN URINE: CPT

## 2023-07-20 PROCEDURE — 94003 VENT MGMT INPAT SUBQ DAY: CPT

## 2023-07-20 PROCEDURE — 83880 ASSAY OF NATRIURETIC PEPTIDE: CPT

## 2023-07-20 PROCEDURE — 83605 ASSAY OF LACTIC ACID: CPT

## 2023-07-20 PROCEDURE — 86147 CARDIOLIPIN ANTIBODY EA IG: CPT

## 2023-07-20 PROCEDURE — 87493 C DIFF AMPLIFIED PROBE: CPT

## 2023-07-20 PROCEDURE — 36573 INSJ PICC RS&I 5 YR+: CPT

## 2023-07-20 PROCEDURE — 86850 RBC ANTIBODY SCREEN: CPT

## 2023-07-20 PROCEDURE — U0005: CPT

## 2023-07-20 PROCEDURE — 99152 MOD SED SAME PHYS/QHP 5/>YRS: CPT

## 2023-07-20 PROCEDURE — 86038 ANTINUCLEAR ANTIBODIES: CPT

## 2023-07-20 PROCEDURE — 84425 ASSAY OF VITAMIN B-1: CPT

## 2023-07-20 PROCEDURE — C1729: CPT

## 2023-07-20 PROCEDURE — 87186 SC STD MICRODIL/AGAR DIL: CPT

## 2023-07-20 PROCEDURE — 82652 VIT D 1 25-DIHYDROXY: CPT

## 2023-07-20 PROCEDURE — 93005 ELECTROCARDIOGRAM TRACING: CPT

## 2023-07-20 PROCEDURE — 49423 EXCHANGE DRAINAGE CATHETER: CPT | Mod: 59

## 2023-07-20 PROCEDURE — 93308 TTE F-UP OR LMTD: CPT

## 2023-07-20 PROCEDURE — U0003: CPT

## 2023-07-20 PROCEDURE — 84295 ASSAY OF SERUM SODIUM: CPT

## 2023-07-20 PROCEDURE — 93321 DOPPLER ECHO F-UP/LMTD STD: CPT

## 2023-07-20 PROCEDURE — 87507 IADNA-DNA/RNA PROBE TQ 12-25: CPT

## 2023-07-20 PROCEDURE — 83540 ASSAY OF IRON: CPT

## 2023-07-20 PROCEDURE — 87040 BLOOD CULTURE FOR BACTERIA: CPT

## 2023-07-20 PROCEDURE — 84132 ASSAY OF SERUM POTASSIUM: CPT

## 2023-07-20 PROCEDURE — 86891 AUTOLOGOUS BLOOD OP SALVAGE: CPT

## 2023-07-20 PROCEDURE — P9045: CPT

## 2023-07-20 PROCEDURE — 86985 SPLIT BLOOD OR PRODUCTS: CPT

## 2023-07-20 PROCEDURE — 97110 THERAPEUTIC EXERCISES: CPT

## 2023-07-20 PROCEDURE — 82436 ASSAY OF URINE CHLORIDE: CPT

## 2023-07-20 PROCEDURE — 83930 ASSAY OF BLOOD OSMOLALITY: CPT

## 2023-07-20 PROCEDURE — 85018 HEMOGLOBIN: CPT

## 2023-07-20 PROCEDURE — 87340 HEPATITIS B SURFACE AG IA: CPT

## 2023-07-20 PROCEDURE — 87635 SARS-COV-2 COVID-19 AMP PRB: CPT

## 2023-07-20 PROCEDURE — 83735 ASSAY OF MAGNESIUM: CPT

## 2023-07-20 PROCEDURE — C1889: CPT

## 2023-07-20 PROCEDURE — 82248 BILIRUBIN DIRECT: CPT

## 2023-07-20 PROCEDURE — C1757: CPT

## 2023-07-20 PROCEDURE — 84156 ASSAY OF PROTEIN URINE: CPT

## 2023-07-20 PROCEDURE — 84134 ASSAY OF PREALBUMIN: CPT

## 2023-07-20 PROCEDURE — 81003 URINALYSIS AUTO W/O SCOPE: CPT

## 2023-07-20 PROCEDURE — 76705 ECHO EXAM OF ABDOMEN: CPT

## 2023-07-20 PROCEDURE — 93312 ECHO TRANSESOPHAGEAL: CPT

## 2023-07-20 PROCEDURE — 80202 ASSAY OF VANCOMYCIN: CPT

## 2023-07-20 PROCEDURE — P9011: CPT

## 2023-07-20 PROCEDURE — 49424 ASSESS CYST CONTRAST INJECT: CPT

## 2023-07-20 PROCEDURE — 86709 HEPATITIS A IGM ANTIBODY: CPT

## 2023-07-20 PROCEDURE — 82803 BLOOD GASES ANY COMBINATION: CPT

## 2023-07-20 PROCEDURE — 83615 LACTATE (LD) (LDH) ENZYME: CPT

## 2023-07-20 PROCEDURE — 84133 ASSAY OF URINE POTASSIUM: CPT

## 2023-07-20 PROCEDURE — 86706 HEP B SURFACE ANTIBODY: CPT

## 2023-07-20 PROCEDURE — 84300 ASSAY OF URINE SODIUM: CPT

## 2023-07-20 PROCEDURE — 82746 ASSAY OF FOLIC ACID SERUM: CPT

## 2023-07-20 PROCEDURE — 83935 ASSAY OF URINE OSMOLALITY: CPT

## 2023-07-20 PROCEDURE — 97166 OT EVAL MOD COMPLEX 45 MIN: CPT

## 2023-07-20 PROCEDURE — 80307 DRUG TEST PRSMV CHEM ANLYZR: CPT

## 2023-07-20 PROCEDURE — 97112 NEUROMUSCULAR REEDUCATION: CPT

## 2023-07-20 PROCEDURE — 82962 GLUCOSE BLOOD TEST: CPT

## 2023-07-20 PROCEDURE — 84443 ASSAY THYROID STIM HORMONE: CPT

## 2023-07-20 PROCEDURE — 80162 ASSAY OF DIGOXIN TOTAL: CPT

## 2023-07-20 PROCEDURE — P9016: CPT

## 2023-07-20 PROCEDURE — 86803 HEPATITIS C AB TEST: CPT

## 2023-07-20 PROCEDURE — 85730 THROMBOPLASTIN TIME PARTIAL: CPT

## 2023-07-20 PROCEDURE — 86923 COMPATIBILITY TEST ELECTRIC: CPT

## 2023-07-20 PROCEDURE — 80053 COMPREHEN METABOLIC PANEL: CPT

## 2023-07-20 PROCEDURE — 74174 CTA ABD&PLVS W/CONTRAST: CPT

## 2023-07-20 PROCEDURE — 93306 TTE W/DOPPLER COMPLETE: CPT

## 2023-07-20 PROCEDURE — 94002 VENT MGMT INPAT INIT DAY: CPT

## 2023-07-20 PROCEDURE — 74018 RADEX ABDOMEN 1 VIEW: CPT

## 2023-07-20 PROCEDURE — 86901 BLOOD TYPING SEROLOGIC RH(D): CPT

## 2023-07-20 PROCEDURE — 83874 ASSAY OF MYOGLOBIN: CPT

## 2023-07-20 PROCEDURE — 87324 CLOSTRIDIUM AG IA: CPT

## 2023-07-20 PROCEDURE — 87517 HEPATITIS B DNA QUANT: CPT

## 2023-07-20 PROCEDURE — 87205 SMEAR GRAM STAIN: CPT

## 2023-07-20 PROCEDURE — 82330 ASSAY OF CALCIUM: CPT

## 2023-07-20 PROCEDURE — 82947 ASSAY GLUCOSE BLOOD QUANT: CPT

## 2023-07-20 PROCEDURE — 92610 EVALUATE SWALLOWING FUNCTION: CPT

## 2023-07-20 PROCEDURE — 84478 ASSAY OF TRIGLYCERIDES: CPT

## 2023-07-20 PROCEDURE — 97530 THERAPEUTIC ACTIVITIES: CPT

## 2023-07-20 PROCEDURE — 87637 SARSCOV2&INF A&B&RSV AMP PRB: CPT

## 2023-07-20 PROCEDURE — 88311 DECALCIFY TISSUE: CPT

## 2023-07-20 PROCEDURE — 80076 HEPATIC FUNCTION PANEL: CPT

## 2023-09-15 NOTE — PROGRESS NOTE ADULT - REASON FOR ADMISSION
Follow Up      Patient Name: Kavitha Barnhart  : 1946   MRN: [id number]     Chief Complaint:    Chief Complaint   Patient presents with    Follow-up     Ibs c/d       History of Present Illness: Kavitha Barnhart is a 77 y.o. female who is here today for follow up on IBS     IBS-mixed:   Had been doing well until she had covid in August. She was having a lot of diarrhea with it. She used bentyl which was helpful. The past 1 week have been taking miralax this week for constipation.  She is using abdominal massage she learned in PT which is greatly  beneficial.     Mother had pancreatic cancer (in the duct)- wonders if there is a screening- for this.    Previously followed the Low FODMAP diet.  She did not find this very helpful as she is already avoiding her trigger foods. She very afraid to take anything that may constipate her after her history of colon resection. Found the most benefit with PT pelvic floor.     Fiber upset her stomach and she could not tolerate this.     Celiac antibody panel negative.     CT Abdomen Pelvis Without Contrast (2022 20:18)-No acute process seen within the abdomen or pelvis.  US Abdomen Limited (2022 11:40)Cholelithiasis without evidence of cholecystitis.  Redemonstration of a few scattered small hepatic cysts.     SCANNED - COLONOSCOPY (2021)-widely patent lower rectal anastomosis, diverticuli and remaining colon without inflammation     Abdominal surgical history includes Luisa  There is a history of bowel perforation from complicated diverticulitis in 2015 with colon resection by Dr. Jacob, and appendectomy, and exploratory laparotomy    Subjective      Review of Systems:   Review of Systems   Constitutional:  Negative for appetite change and unexpected weight loss.   HENT:  Negative for trouble swallowing.    Gastrointestinal:  Positive for constipation. Negative for abdominal distention, abdominal pain, anal bleeding, blood in stool, diarrhea, nausea,  rectal pain, vomiting, GERD and indigestion.     Medications:     Current Outpatient Medications:     dicyclomine (BENTYL) 10 MG capsule, Take 1 capsule by mouth 2 (Two) Times a Day., Disp: 60 capsule, Rfl: 5    albuterol sulfate  (90 Base) MCG/ACT inhaler, Inhale 2 puffs Every 4 (Four) Hours As Needed for Wheezing., Disp: 1 g, Rfl: 5    celecoxib (CeleBREX) 100 MG capsule, Take 1 capsule by mouth 2 (Two) Times a Day As Needed for Mild Pain., Disp: 60 capsule, Rfl: 2    clindamycin (CLEOCIN T) 1 % external solution, APPLY TO AFFECTED AREA ONCE TO TWICE DAILY AFTER SHOWER, Disp: , Rfl:     diclofenac (VOLTAREN) 1 % gel gel, Apply 4 g topically to the appropriate area as directed 4 (Four) Times a Day. (Patient taking differently: Apply 4 g topically to the appropriate area as directed 4 (Four) Times a Day As Needed (joint pain).), Disp: 100 g, Rfl: 3    fexofenadine (ALLEGRA) 180 MG tablet, Take 1 tablet by mouth Daily As Needed (allergies)., Disp: , Rfl:     Fluticasone-Salmeterol (Wixela Inhub) 100-50 MCG/ACT DISKUS, Inhale 1 puff 2 (Two) Times a Day., Disp: 180 each, Rfl: 1    losartan-hydrochlorothiazide (HYZAAR) 100-25 MG per tablet, TAKE 1 TABLET BY MOUTH EVERY DAY., Disp: 90 tablet, Rfl: 1    metoprolol succinate XL (TOPROL-XL) 200 MG 24 hr tablet, TAKE 1 TABLET BY MOUTH EVERY DAY., Disp: 90 tablet, Rfl: 1    rosuvastatin (CRESTOR) 5 MG tablet, Take 1 tablet by mouth Daily., Disp: 90 tablet, Rfl: 3    Synthroid 75 MCG tablet, Take 1 tablet by mouth Daily., Disp: 90 tablet, Rfl: 3    Triamcinolone Acetonide (NASACORT) 55 MCG/ACT nasal inhaler, 2 sprays into the nostril(s) as directed by provider Daily., Disp: , Rfl:     Yuvafem 10 MCG tablet vaginal tablet, APPLY 1 TABLET IN THE VAGINA 2 TIMES WEEKLY., Disp: , Rfl:     Allergies:   Allergies   Allergen Reactions    Tenormin [Atenolol] Cough and Angioedema    Macrobid [Nitrofurantoin Monohyd Macro] Swelling and Other (See Comments)     Swelling around  eyes, fatigue    Plaquenil [Hydroxychloroquine Sulfate] Other (See Comments)     Vision changes over time     Shingrix [Zoster Vac Recomb Adjuvanted] Rash    Omeprazole Diarrhea    Ace Inhibitors Cough    Sudafed [Pseudoephedrine Hcl] Other (See Comments)     INCREASED PRESSURE IN EYES        Social History:   Social History     Socioeconomic History    Marital status:    Tobacco Use    Smoking status: Never    Smokeless tobacco: Never   Vaping Use    Vaping Use: Never used   Substance and Sexual Activity    Alcohol use: Yes     Alcohol/week: 1.0 - 2.0 standard drink     Types: 1 - 2 Glasses of wine per week     Comment: social    Drug use: No    Sexual activity: Not Currently     Partners: Male     Birth control/protection: None        Surgical History:   Past Surgical History:   Procedure Laterality Date    ADENOIDECTOMY      Removed as a child    APPENDECTOMY  2016    BONE GRAFT  2021    BREAST BIOPSY Left 10/15/2021    excisional    BREAST SURGERY  10/2021     SECTION  1970    CHOLECYSTECTOMY N/A 2022    Procedure: CHOLECYSTECTOMY LAPAROSCOPIC;  Surgeon: nAgie López MD;  Location: [facility];  Service: General;  Laterality: N/A;    COLON RESECTION  2016    sigmoid and partial rectum     COLON SURGERY      COLONOSCOPY      EXPLORATORY LAPAROTOMY      fibroid    ILEOSTOMY CLOSURE N/A 2016    Procedure: ILEOSTOMY  EXCISION AND TAKEDOWN;  Surgeon: Brooks Jacob MD;  Location: [facility];  Service:     KNEE ARTHROSCOPY      right knee scope    MYOMECTOMY      SMALL INTESTINE SURGERY      TONSILECTOMY, ADENOIDECTOMY, BILATERAL MYRINGOTOMY AND TUBES      TONSILLECTOMY      TOOTH EXTRACTION      2021    TOOTH EXTRACTION      UPPER GASTROINTESTINAL ENDOSCOPY          Medical History:   Past Medical History:   Diagnosis Date    Abnormal ECG but low risk assessment    Allergic rhinitis Many years ago    Anemia Prior to menopause    Arrhythmia     Arthritis     Asthma      "Asthma, intrinsic around 2000    Atrial fibrillation     Received 4 to 5 notifications on [employer] re: I may have Atrial fib    Cervical disc disease     Diastolic dysfunction 6/7/2023    Disease of thyroid gland     Diverticulitis of intestine with perforation     E-coli UTI 2019    treated with antibioiutcs and urine rechecked and clean per pt report    GERD (gastroesophageal reflux disease)     Hashimoto's disease     Headache     Heart murmur     fast heart beat; sometimes irregular    History of transfusion     several times, with surgeries post op and after c section, never a reaction    Hyperlipidemia sometimes marginally high    Hypertension     Hyperthyroidism     Thyroid nodule, hashimoto's thyroiditis    Hypothyroidism     Irritable bowel syndrome     Kidney stone     Low back pain     sometimes    Lupus     Neuromuscular disorder     Nerve damage lower back    Palpitations     POSSIBLE ATRIAL TACHYCARDIA    PONV (postoperative nausea and vomiting)     Raynaud's disease     Sinusitis very long time    Sjogren's disease     SVT (supraventricular tachycardia)     Thyroid nodule 2006    Tremor     sometimes hands shake    Visual impairment     Vitamin D deficiency 2000        Objective     Physical Exam:  Vital Signs:   Vitals:    09/15/23 1302   BP: 124/62   BP Location: Right arm   Patient Position: Sitting   Cuff Size: Adult   Pulse: 85   Temp: 96.9 °F (36.1 °C)   TempSrc: Temporal   SpO2: 99%   Weight: 60.9 kg (134 lb 4.8 oz)   Height: 149.3 cm (58.78\")     Body mass index is 27.33 kg/m².     Physical Exam  Constitutional:       General: She is not in acute distress.     Appearance: She is well-developed.   Pulmonary:      Effort: Pulmonary effort is normal. No accessory muscle usage or respiratory distress.   Abdominal:      General: Bowel sounds are normal. There is no distension.      Palpations: Abdomen is soft.      Tenderness: There is no abdominal tenderness.   Skin:     Coloration: Skin is not " A flutter pale.      Findings: No erythema.   Neurological:      Mental Status: She is alert and oriented to person, place, and time.   Psychiatric:         Speech: Speech normal.         Behavior: Behavior normal.         Thought Content: Thought content normal.         Judgment: Judgment normal.       Assessment / Plan      Assessment/Plan:   Diagnoses and all orders for this visit:    1. Irritable bowel syndrome with both constipation and diarrhea (Primary)  -     dicyclomine (BENTYL) 10 MG capsule; Take 1 capsule by mouth 2 (Two) Times a Day.  Dispense: 60 capsule; Refill: 5    2. Pelvic floor dysfunction in female    3. Dilated bile duct  -     US abdomen limited; Future       Continue exercises learned in PT.  Continue Miralax and bentyl as needed.    Follow Up:   Return in about 6 months (around 3/15/2024), or if symptoms worsen or fail to improve, for Recheck.    Plan of care reviewed with the patient at the conclusion of today's visit.  Education was provided regarding diagnosis, management, and any prescribed or recommended OTC medications.  Patient verbalized understanding of and agreement with management plan.       JACK Lares  [facility]

## 2023-11-16 NOTE — PROGRESS NOTE ADULT - ASSESSMENT
Anesthesia Pre Eval Note    Anesthesia ROS/Med Hx        Anesthetic Complication History:  Patient does not have a history of anesthetic complications      Pulmonary Review:  Patient does not have a pulmonary history      Neuro/Psych Review:    Positive for TIA    Cardiovascular Review:  Comments: Patient with chest pain with abnormal coronary CTA.     Patient states that occasionally patient developed chest pain and went to the neck.  It is sharp in nature and always relieved with rest.  For this reason patient underwent stress test which was unremarkable for ischemia.  It revealed a evidence of inferior wall infarction.  This was followed up with a CTA coronary angiography which revealed 75% stenosis of proximal LAD lesion.       His cardiovascular history significant for angioplasty and stent 2008, followed by 2013, another 2015.  Patient underwent cardiac catheterization as well as pacemaker implant 2019.  Patient has a history of a right nephrectomy 1962 with chronic kidney disease.    Positive for past MI  Positive for pacemaker (underlying SB in 30s) - Dependent  Positive for CAD    Positive for cardiac stents  Positive for angina (per cards is small vessel dz - medical management) - with exertion  Positive for hypertension  Positive for hyperlipidemia    GI/HEPATIC/RENAL Review:    Positive for renal disease - chronic renal insufficiency    End/Other Review:    Positive for obesity class I - 30.00 - 34.99  Positive for anemia - Chronic  Positive for chronic pain  Additional Results:     ALLERGIES:   -- Naproxen -- Other (See Comments)    --  Contraindication due to being on plavix   -- Atorvastatin -- Other (See Comments)    --  Caused problem with liver   -- Lisinopril -- Other (See Comments)    --  Cough   -- Phenazopyridine -- Other (See Comments)    --  Contains yellow dye   -- Yellow Dye   (Food Or Med) -- HIVES    --  DO NOT GIVE MEDICATIONS WITH YELLOW DYE   -- Flomax -- Other (See Comments)    --   Unclear thinking   -- Glimepiride -- Other (See Comments)    --  Dull, listless   No results found for: \"WBC\", \"RBC\", \"HGB\", \"HCT\", \"MCV\", \"MCH\", \"MCHC\", \"RDWCV\", \"SODIUM\", \"POTASSIUM\", \"CHLORIDE\", \"CO2\", \"GLUCOSE\", \"BUN\", \"CREATININE\", \"GFRESTIMATE\", \"EGFRNONAFR\", \"GFRA\", \"GFRNA\", \"CALCIUM\", \"HCG\", \"PLT\", \"PTT\", \"INR\"   Past Medical History:  No date: Anemia  No date: Angina of effort      Comment:  2023  No date: Chronic kidney disease      Comment:  PATIENT HAS ONLY 1 KIDNEY LEFT, RIGHT REMOVED AT AGE 11  No date: Chronic pain  No date: Coronary artery disease  No date: Essential (primary) hypertension  No date: High cholesterol  No date: Thyroid condition  No date: TIA (transient ischemic attack)      Comment:  2000  Past Surgical History:  No date: Angioplasty      Comment:  2015  No date: Appendectomy  No date: Pacemaker implant      Comment:  2019  No date: Stent implant; N/A      Comment:  CARDIAC, 2008, 2013  No date: Total knee replacement; Right   Prior to Admission medications :  Medication isosorbide mononitrate (IMDUR) 30 MG 24 hr tablet, Sig Take 15 mg by mouth daily., Start Date 11/2/23, End Date , Taking? Yes, Authorizing Provider Provider, Outside    Medication Synthroid 150 MCG tablet, Sig Take 150 mcg by mouth every morning., Start Date , End Date , Taking? Yes, Authorizing Provider Provider, Outside    Medication Welchol 625 MG tablet, Sig Take 1,875 mg by mouth in the morning and 1,875 mg in the evening. Take with meals., Start Date , End Date , Taking? Yes, Authorizing Provider Provider, Outside    Medication Ascorbic Acid (vitamin C) 500 MG tablet, Sig Take 500 mg by mouth every morning., Start Date , End Date , Taking? Yes, Authorizing Provider Provider, Outside    Medication zinc sulfate (ZINCATE) 220 (50 Zn) MG capsule, Sig Take 220 mg by mouth every morning., Start Date , End Date , Taking? Yes, Authorizing Provider Provider, Outside    Medication Cholecalciferol (Vitamin D) 50 mcg (2,000  units) tablet, Sig Take 50 mcg by mouth in the morning and 50 mcg in the evening., Start Date , End Date , Taking? Yes, Authorizing Provider Provider, Outside    Medication Vitamin E 180 mg (400 units) capsule, Sig Take 180 mg by mouth every morning., Start Date , End Date , Taking? Yes, Authorizing Provider Provider, Outside    Medication Pyridoxine HCl (vitamin B-6) 50 MG tablet, Sig Take 50 mg by mouth every morning., Start Date , End Date , Taking? Yes, Authorizing Provider Provider, Outside    Medication LUTEIN PO, Sig Take 1 tablet by mouth every morning., Start Date , End Date , Taking? Yes, Authorizing Provider Provider, Outside    Medication Copper Gluconate (Copper Caps) 2 MG Cap, Sig Take 1 capsule by mouth every morning., Start Date , End Date , Taking? Yes, Authorizing Provider Provider, Outside    Medication Cyanocobalamin (VITAMIN B-12 PO), Sig Take 1 tablet by mouth every morning., Start Date , End Date , Taking? Yes, Authorizing Provider Provider, Outside    Medication losartan (COZAAR) 50 MG tablet, Sig Take 50 mg by mouth at bedtime., Start Date , End Date , Taking? Yes, Authorizing Provider Provider, Outside    Medication clopidogrel (PLAVIX) 75 MG tablet, Sig Take 75 mg by mouth every morning., Start Date , End Date , Taking? Yes, Authorizing Provider Provider, Outside    Medication metFORMIN (GLUCOPHAGE) 500 MG tablet, Sig Take 500 mg by mouth every evening., Start Date , End Date , Taking? Yes, Authorizing Provider Provider, Outside    Medication fenofibrate (TRICOR) 145 MG tablet, Sig Take 145 mg by mouth every morning., Start Date , End Date , Taking? Yes, Authorizing Provider Provider, Outside    Medication aspirin 325 MG tablet, Sig Take 325 mg by mouth every evening. Has instructions, Start Date , End Date , Taking? Yes, Authorizing Provider Provider, Outside         Patient Vitals in the past 24 hrs:  11/16/23 0912, Temp:35.8 °C (96.4 °F), Temp src:Temporal, Pulse:(!) 60, Resp:17,  SpO2:98 %, Height:5' 7.32\" (1.71 m), Weight:102 kg (224 lb 13.9 oz)  Social history reviewed:  Social History    Tobacco Use      Smoking status: Never      Smokeless tobacco: Never   E-Cigarette/Vaping Substances & Devices  E-Cigarette/Vaping Use: Never Used    Social History    Substance and Sexual Activity      Alcohol use: Not Currently        Relevant Problems   No relevant active problems       Physical Exam     Airway   Mallampati: II  TM Distance: >3 FB  Neck ROM: Full  Neck: Non-tender and Able to place in sniff position  TMJ Mobility: Good    Cardiovascular  Cardiovascular exam normal  Cardio Rhythm: Regular  Cardio Rate: Normal    Head Assessment  Head assessment: Normocephalic and Atraumatic    General Assessment  General Assessment: Alert and oriented and No acute distress    Dental Exam    Patient has:  Denied broken/chipped/loose teeth    Pulmonary Exam  Pulmonary exam normal  Breath sounds clear to auscultation:  Yes    Abdominal Exam    Patient Demonstrates:  Obese      Anesthesia Plan:    ASA Status: 3  Anesthesia Type: Spinal    Preferred Airway Type: Nasal Cannula  Maintenance: TIVA  Premedication: IV      Post-op Pain Management: Per Surgeon and Single Shot Block      Checklist  Reviewed: NPO Status, Allergies, Medications, Problem list, Past Med History, Patient Summary, Lab Results, Consultations, Outside Records and Beta Blocker Status  Consent/Risks Discussed Statement:  The proposed anesthetic plan, including its risks and benefits, have been discussed with the Patient and Spouse along with the risks and benefits of alternatives. Questions were encouraged and answered and the patient and/or representative understands and agrees to proceed.    I have discussed elements of the patient's history or examination, as noted above and/or as follows, that place the patient at higher risk of complications; age, BMI> 30 (obesity), heart disease, kidney disease, neurological disease and hematological  77M with PMHx of IVDU found unresponsive at his nursing home, resolved after Narcan in the field and brought to [facility]. Found to have PE, atrial flutter, and large LV thrombus on echo. Transferred to [facility] for [facility] management. Pt c/o abdominal pain on 12/10 CTA showing mid SMA with embolus. Abnormal distal small bowel loops and cecum with dilatation and pneumatosis suggesting infarcted bowel. One or two tiny foci of intrahepatic portal vein pneumatosis. Segmental infarction upper pole left kidney. Now s/p Ex lap, SMA embolectomy, 80cm SBR, abthera vac left in discontinuity (12/11) and transferred to SICU intubated. S/p second look (12/13) and most recently s/p OR for 3rd look, end-to-end anastomosis of remaining bowel, loop ileostomy and abdomen closure (12/15). Remains in SICU, now extubated with still with limb ischemia to LLE pending amputation. Now stepped down to tele.     Reg/High fiber diet + ensures, DHT placed for TF, Jevity 1.2@goal   Pain/nausea control  Imodium, metamucil tincture of opium for high ileostomy output  A flutter now s/p cardioversion, amio load, now on amio 200 QD, toprolol XL 25 QD  LV thrombus w LLE limb ischemia- Heparin gtt  changed to SQL BID  Planning for AKA with vascular surgery  AM labs   disease.    I discussed with the patient (and/or patient's legal representative) the risks and benefits of the proposed anesthesia plan, Spinal, which may include services performed by other anesthesia providers.    Alternative anesthesia plans, if available, were reviewed with the patient (and/or patient's legal representative). Discussion has been held with the patient (and/or patient's legal representative) regarding risks of anesthesia, which include Hypotension, headache, nausea, vomiting, anxiety, back pain, conversion to general anesthesia, hypotension and nerve injury and emergent situations that may require change in anesthesia plan.    The patient (and/or patient's legal representative) has indicated understanding, his/her questions have been answered, and he/she wishes to proceed with the planned anesthetic.    Blood Products: Not Anticipated

## 2023-12-01 NOTE — PROGRESS NOTE ADULT - REASON FOR ADMISSION
A flutter PATIENT INSTRUCTIONS    Treatment:  We have made a home exercise program for you.  Please complete it daily.  You can access this program at [email or web address].  Your access codes is [email or web address].     Follow-Up:  Call or return to the clinic as needed if these symptoms worsen fail to improve as anticipated, or if new symptoms develop.         Please note: 24 hour notice for cancellation of appointment is required.    You may receive a survey in the mail, or via the e-mail address that you have provided.  We would appreciate if you could fill out the survey and provide us with any feedback on your experience regarding your visit today. Thank you for allowing us to provide you with your health care needs.     Do not hesitate to call if you are experiencing severe pain, worsening or change in your pain, have symptoms of infection (fever, warmth, redness, increased drainage), or have any other problem that concerns you ~ [phone] (or [phone] after hours).    Please remember when requesting refills on pain medication that the request should be made by Thursday at the latest. [facility] is open Monday-Friday, 8am-5pm, and closed on the weekends.  No narcotic refills will be filled after hours.    Additional Educational Resources:  For additional resources regarding your symptoms, diagnosis, or further health information, please visit the Health Resources section on [email or web address] or the Online Health Resources section in [email or web address].

## 2023-12-27 NOTE — PROGRESS NOTE ADULT - PROVIDER SPECIALTY LIST ADULT
Ostomy Referral Progress Note      NAME:  Tammy Frederick  MEDICAL RECORD NUMBER:  [id number]  AGE: 74 y.o.   GENDER:  female  :  1949  TODAY'S DATE:  2023    Subjective     Tammy Frederick is a 74 y.o. female referred by:   [x] Physician  [x] Nursing  [] Other:     Patient  stoma marked for possible surgery 2023    Surgeon Marcelo    PAST MEDICAL HISTORY:        Diagnosis Date    Arthritis        MEDICATIONS:    No current facility-administered medications on file prior to encounter.     Current Outpatient Medications on File Prior to Encounter   Medication Sig Dispense Refill    nicotine polacrilex (NICORETTE) 2 MG gum Take 1 each by mouth as needed for Smoking cessation      ibuprofen (ADVIL;MOTRIN) 200 MG tablet Take 1 tablet by mouth daily      rivaroxaban (XARELTO) 10 MG TABS tablet for DVT PROPHYLAXIS Take 1 tablet by mouth Daily. 12 tablet 0    therapeutic multivitamin-minerals (THERAGRAN-M) tablet Take 1 tablet by mouth daily         ALLERGIES:    No Known Allergies    PAST SURGICAL HISTORY:    Past Surgical History:   Procedure Laterality Date    ABDOMINAL SURGERY      cholecystectomy    JOINT REPLACEMENT  12    right knee    TOTAL KNEE ARTHROPLASTY  2011    Left knee       FAMILY HISTORY:    family history is not on file.    SOCIAL HISTORY:    Social History     Tobacco Use    Smoking status: Never   Substance Use Topics    Alcohol use: Yes       LABS:  WBC:    Lab Results   Component Value Date/Time    WBC 10.1 2023 04:44 AM     H/H:    Lab Results   Component Value Date/Time    HGB 10.2 2023 04:44 AM    HCT 30.6 2023 04:44 AM     BMP:    Lab Results   Component Value Date/Time     2023 04:44 AM    K 3.6 2023 04:44 AM    K 4.0 2023 06:02 AM     2023 04:44 AM    CO2 28 2023 04:44 AM    BUN 7 2023 04:44 AM    LABALBU 3.1 2023 05:35 AM    CREATININE <0.5  Surgery

## 2024-01-16 NOTE — PROGRESS NOTE ADULT - SUBJECTIVE AND OBJECTIVE BOX
Provider Last Rate Last Admin   • sodium chloride flush 0.9 % injection 5-40 mL  5-40 mL IntraVENous 2 times per day Amy Villegas MD       • sodium chloride flush 0.9 % injection 5-40 mL  5-40 mL IntraVENous PRN Amy Villegas MD       • 0.9 % sodium chloride infusion   IntraVENous PRN Amy Villegas MD       • ceFAZolin (ANCEF) 2,000 mg in sodium chloride 0.9 % 100 mL IVPB (mini-bag)  2,000 mg IntraVENous On Call to OR Amy Villegas MD       • 0.9 % sodium chloride infusion   IntraVENous Continuous Thiago Medley DO           Allergies:    Allergies   Allergen Reactions   • Lisinopril Nausea Only and Other (See Comments)     cough   • Tramadol Nausea Only       Problem List:    Patient Active Problem List   Diagnosis Code   • Hypothyroid E03.9   • Essential hypertension I10   • Hyperlipidemia E78.5   • Class 3 severe obesity due to excess calories with serious comorbidity and body mass index (BMI) of 40.0 to 44.9 in adult (Formerly [facility]) E66.01, Z68.41   • History of ST elevation myocardial infarction (STEMI) I25.2   • Coronary artery disease involving native coronary artery of native heart without angina pectoris I25.10   • Chronic bilateral low back pain with bilateral sciatica M54.42, M54.41, G89.29   • Primary osteoarthritis of both knees M17.0   • 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652   • Acute pneumonia J18.9   • Spondylolisthesis of lumbar region M43.16   • Spinal  Patient is a 77y old  Male who presents with a chief complaint of A flutter (18 Jan 2023 09:57)    INTERVAL EVENTS:    SUBJECTIVE:  Patient was seen and examined at bedside. Patient reports feeling at baseline, denies chest pain, no SOB, no abdominal pain. Seen by vascular and ID. Afebrile over last 24h. No other complaints or events reported    Review of systems: Rest of 12 point Review of systems negative unless otherwise documented elsewhere in note.         MEDICATIONS:  MEDICATIONS  (STANDING):  aMIOdarone    Tablet 100 milliGRAM(s) Oral every 24 hours  ascorbic acid 500 milliGRAM(s) Oral daily  chlorhexidine 2% Cloths 1 Application(s) Topical <User Schedule>  chlorhexidine 2% Cloths 1 Application(s) Topical <User Schedule>  dextrose 5%. 1000 milliLiter(s) (50 mL/Hr) IV Continuous <Continuous>  dextrose 5%. 1000 milliLiter(s) (100 mL/Hr) IV Continuous <Continuous>  dronabinol 5 milliGRAM(s) Oral every 12 hours  enoxaparin Injectable 60 milliGRAM(s) SubCutaneous every 12 hours  fat emulsion (Fish Oil and Plant Based) 20% Infusion 0.3106 Gm/kG/Day (8.33 mL/Hr) IV Continuous <Continuous>  glucagon  Injectable 1 milliGRAM(s) IntraMuscular once  influenza  Vaccine (HIGH DOSE) 0.7 milliLiter(s) IntraMuscular once  insulin lispro (ADMELOG) corrective regimen sliding scale   SubCutaneous every 6 hours  lactated ringers. 1000 milliLiter(s) (100 mL/Hr) IV Continuous <Continuous>  metoprolol succinate ER 25 milliGRAM(s) Oral daily  mirtazapine 15 milliGRAM(s) Oral at bedtime  multivitamin 1 Tablet(s) Oral daily  pantoprazole    Tablet 40 milliGRAM(s) Oral two times a day  Parenteral Nutrition - Adult 1 Each (50 mL/Hr) TPN Continuous <Continuous>  Parenteral Nutrition - Adult 1 Each (50 mL/Hr) TPN Continuous <Continuous>  psyllium Powder 1 Packet(s) Oral every 8 hours  silver sulfADIAZINE 1% Cream 1 Application(s) Topical daily  zinc sulfate 220 milliGRAM(s) Oral daily    MEDICATIONS  (PRN):  acetaminophen     Tablet .. 650 milliGRAM(s) Oral every 6 hours PRN Temp greater or equal to 38C (100.4F), Mild Pain (1 - 3)  dextrose Oral Gel 15 Gram(s) Oral once PRN Blood Glucose LESS THAN 70 milliGRAM(s)/deciliter  melatonin 3 milliGRAM(s) Oral at bedtime PRN Insomnia  ondansetron Injectable 4 milliGRAM(s) IV Push every 6 hours PRN Nausea and/or Vomiting  oxyCODONE    IR 5 milliGRAM(s) Oral every 4 hours PRN Severe Pain (7 - 10)  sodium chloride 0.9% lock flush 10 milliLiter(s) IV Push every 1 hour PRN Pre/post blood products, medications, blood draw, and to maintain line patency      Allergies    No Known Allergies    Intolerances        OBJECTIVE:  Vital Signs Last 24 Hrs  T(C): 37.7 (18 Jan 2023 09:15), Max: 37.7 (18 Jan 2023 09:15)  T(F): 99.9 (18 Jan 2023 09:15), Max: 99.9 (18 Jan 2023 09:15)  HR: 100 (18 Jan 2023 08:37) (98 - 106)  BP: 130/69 (18 Jan 2023 08:37) (130/69 - 158/76)  BP(mean): 91 (18 Jan 2023 08:37) (91 - 103)  RR: 18 (18 Jan 2023 08:37) (16 - 18)  SpO2: 98% (18 Jan 2023 08:37) (97% - 100%)    Parameters below as of 18 Jan 2023 08:37  Patient On (Oxygen Delivery Method): room air      I&O's Summary    17 Jan 2023 07:01  -  18 Jan 2023 07:00  --------------------------------------------------------  IN: 3791.3 mL / OUT: 4450 mL / NET: -658.7 mL    18 Jan 2023 07:01  -  18 Jan 2023 11:21  --------------------------------------------------------  IN: 600 mL / OUT: 520 mL / NET: 80 mL        PHYSICAL EXAM:  General: awake, alert, NAD, looking comfortable, speaking in full sentences, no labored breathing on RA  HEENT: AT/NC, no facial asymmetry   Lungs: poor inspiration, no crackles, no wheezes  Heart: RRR  Abdomen: soft  Extremities: left leg with dry gangrene in dressing, no visible tenderness    LABS:                        10.1   11.38 )-----------( 348      ( 18 Jan 2023 05:30 )             31.4     01-18    133<L>  |  99  |  14  ----------------------------<  129<H>  3.5   |  25  |  0.54    Ca    8.5      18 Jan 2023 05:30  Phos  2.8     01-18  Mg     1.8     01-18    TPro  7.0  /  Alb  2.7<L>  /  TBili  0.7  /  DBili  x   /  AST  41<H>  /  ALT  65<H>  /  AlkPhos  175<H>  01-17    LIVER FUNCTIONS - ( 17 Jan 2023 05:30 )  Alb: 2.7 g/dL / Pro: 7.0 g/dL / ALK PHOS: 175 U/L / ALT: 65 U/L / AST: 41 U/L / GGT: x             CAPILLARY BLOOD GLUCOSE      POCT Blood Glucose.: 147 mg/dL (17 Jan 2023 17:10)  POCT Blood Glucose.: 124 mg/dL (17 Jan 2023 11:33)        MICRODATA:      RADIOLOGY/OTHER STUDIES:

## 2024-04-08 NOTE — PROGRESS NOTE ADULT - ASSESSMENT
Please call the patient and see if he is set up with vascular to monitor his abdominal aortic aneurysm.    78 y/o M with no significant PMHx found unresponsive at his nursing home, resolved after narcan in the field, and brought to [facility]. Found to have LV dysfunction (suspected Tachy mediated), sub-segmental PE, atrial flutter, and large LV thrombus on echo. Transferred to [facility] for further management. Course complicated by acute mesenteric ischemia now s/p embolectomy and bowel resection, septic shock, LLE arterial occlusion, and planned for DCCV however hospital course further c/b GIB / hemorrhagic shock so DCCV was aborted.       #Atrial flutter  -NPO given GIB/hemorrhagic shock/hemoperitoneum  -would allow degree of tachycardia in setting of above  -metoprolol 5mg IVP PRN only if sustaining HR > 130BPM and no other active acute pathology driving tachycardia  -once can tolerate PO, reintroduce Toprol XL 25mg daily  -AC once able from surgical perspective    Adrian Saleh MD  EP Fellow

## 2024-11-12 NOTE — PROGRESS NOTE ADULT - REASON FOR ADMISSION
A flutter How to Take Your Medication Before Surgery  Preoperative Medication Instructions   Antiplatelet or Anticoagulation Medication Instructions   - Patient is on no antiplatelet or anticoagulation medications.    Additional Medication Instructions  Stop tube feedings at 11 PM the night prior to surgery.  Hold all morning medications.       Patient Education   Preparing for Your Surgery  For Adults  Getting started  In most cases, a nurse will call to review your health history and instructions. They will give you an arrival time based on your scheduled surgery time. Please be ready to share:  Your doctor's clinic name and phone number  Your medical, surgical, and anesthesia history  A list of allergies and sensitivities  A list of medicines, including herbal treatments and over-the-counter drugs  Whether the patient has a legal guardian (ask how to send us the papers in advance)  Note: You may not receive a call if you were seen at our PAC (Preoperative Assessment Center).  Please tell us if you're pregnant--or if there's any chance you might be pregnant. Some surgeries may injure a fetus (unborn baby), so they require a pregnancy test. Surgeries that are safe for a fetus don't always need a test, and you can choose whether to have one.   Preparing for surgery  Within 10 to 30 days of surgery: Have a pre-op exam (sometimes called an H&P, or History and Physical). This can be done at a clinic or pre-operative center.  If you're having a , you may not need this exam. Talk to your care team.  At your pre-op exam, talk to your care team about all medicines you take. (This includes CBD oil and any drugs, such as THC, marijuana, and other forms of cannabis.) If you need to stop any medicine before surgery, ask when to start taking it again.  This is for your safety. Many medicines and drugs can make you bleed too much during surgery. Some change how well surgery (anesthesia) drugs work.  Call your insurance company to  let them know you're having surgery. (If you don't have insurance, call [phone].)  Call your clinic if there's any change in your health. This includes a scrape or scratch near the surgery site, or any signs of a cold (sore throat, runny nose, cough, rash, fever).  Eating and drinking guidelines  For your safety: Unless your surgeon tells you otherwise, follow the guidelines below.  Eat and drink as normal until 8 hours before you arrive for surgery. After that, no food or milk. You can spit out gum when you arrive.  Drink clear liquids until 2 hours before you arrive. These are liquids you can see through, like water, Gatorade, and Propel Water. They also include plain black coffee and tea (no cream or milk).  No alcohol for 24 hours before you arrive. The night before surgery, stop any drinks that contain THC.  If your care team tells you to take medicine on the morning of surgery, it's okay to take it with a sip of water. No other medicines or drugs are allowed (including CBD oil)--follow your care team's instructions.  If you have questions the day of surgery, call your hospital or surgery center.   Preventing infection  Shower or bathe the night before and the morning of surgery. Follow the instructions your clinic gave you. (If no instructions, use regular soap.)  Don't shave or clip hair near your surgery site. We'll remove the hair if needed.  Don't smoke or vape the morning of surgery. No chewing tobacco for 6 hours before you arrive. A nicotine patch is okay. You may spit out nicotine gum when you arrive.  For some surgeries, the surgeon will tell you to fully quit smoking and nicotine.  We will make every effort to keep you safe from infection. We will:  Clean our hands often with soap and water (or an alcohol-based hand rub).  Clean the skin at your surgery site with a special soap that kills germs.  Give you a special gown to keep you warm. (Cold raises the risk of infection.)  Wear hair covers,  masks, gowns, and gloves during surgery.  Give antibiotic medicine, if prescribed. Not all surgeries need this medicine.  What to bring on the day of surgery  Photo ID and insurance card  Copy of your health care directive, if you have one  Glasses and hearing aids (bring cases)  You can't wear contacts during surgery  Inhaler and eye drops, if you use them (tell us about these when you arrive)  CPAP machine or breathing device, if you use them  A few personal items, if spending the night  If you have . . .  A pacemaker, ICD (cardiac defibrillator), or other implant: Bring the ID card.  An implanted stimulator: Bring the remote control.  A legal guardian: Bring a copy of the certified (court-stamped) guardianship papers.  Please remove any jewelry, including body piercings. Leave jewelry and other valuables at home.  If you're going home the day of surgery  You must have a responsible adult drive you home. They should stay with you overnight as well.  If you don't have someone to stay with you, and you aren't safe to go home alone, we may keep you overnight. Insurance often won't pay for this.  After surgery  If it's hard to control your pain or you need more pain medicine, please call your surgeon's office.  Questions?   If you have any questions for your care team, list them here:   ____________________________________________________________________________________________________________________________________________________________________________________________________________________________________________________________  For informational purposes only. Not to replace the advice of your health care provider. Copyright   2003, 2019 [email or web address] [facility]. All rights reserved. Clinically reviewed by Cong Li MD. [employer] 199989 - REV 08/24.

## 2025-01-04 NOTE — BH CONSULTATION LIAISON ASSESSMENT NOTE - NSBHMSEATTEN_PSY_A_CORE
You have been given a prescription for Robaxin, a muscle relaxant medication that you may take up to 3 times a day, once every 8 hours.  After you take this medication, do not operate a motor vehicle as it can cause you to feel a little drowsy.  It should help reduce the pain.  Please follow up with your primary care doctor if you continued to have this discomfort.  I have placed a referral to see a primary care doctor if you do not already have 1   Normal

## (undated) DEVICE — PACK GENERAL LAPAROSCOPY

## (undated) DEVICE — SUT VICRYL 3-0 27" SH

## (undated) DEVICE — Device

## (undated) DEVICE — DRSG TAPE UMBILICAL COTTON 2" X 30 X 1/8"

## (undated) DEVICE — TROCAR COVIDIEN VERSAONE FIXATION CANNULA 5MM

## (undated) DEVICE — SHEARS HARMONIC ACE 5MM X 36CM CURVED TIP

## (undated) DEVICE — TROCAR COVIDIEN VERSAPORT BLADELESS OPTICAL 12MM STANDARD

## (undated) DEVICE — DRSG XEROFORM 5 X 9"

## (undated) DEVICE — SUT SILK 0 30" TIES

## (undated) DEVICE — GLV 7 PROTEXIS (WHITE)

## (undated) DEVICE — ELCTR GROUNDING PAD ADULT COVIDIEN

## (undated) DEVICE — SUT PROLENE 5-0 36" C-1 VISI-BLACK NDL

## (undated) DEVICE — WARMING BLANKET UPPER ADULT

## (undated) DEVICE — STAPLER SKIN PROXIMATE

## (undated) DEVICE — FOLEY TRAY 16FR 5CC LF UMETER CLOSED

## (undated) DEVICE — SUT SILK 4-0 30" SH

## (undated) DEVICE — SUCTION YANKAUER BULBOUS TIP W VENT

## (undated) DEVICE — ABDOMINAL BINDER XL 9" X 62"-74"

## (undated) DEVICE — VENODYNE/SCD SLEEVE CALF MEDIUM

## (undated) DEVICE — SOL IRR BAG NS 0.9% 3000ML

## (undated) DEVICE — ELCTR BOVIE PENCIL BLADE 10FT

## (undated) DEVICE — PACK ANGIOGRAM LNX SURGICOUNT

## (undated) DEVICE — STAPLER COVIDIEN ENDO GIA SHORT HANDLE

## (undated) DEVICE — DRSG KERLIX ROLL 4.5"

## (undated) DEVICE — LAP PAD 18 X 18"

## (undated) DEVICE — SUT SILK 3-0 30" TIES

## (undated) DEVICE — GLV 7.5 PROTEXIS (WHITE)

## (undated) DEVICE — PACK EXPLORATORY LAPAROTOMY

## (undated) DEVICE — SUT PROLENE 5-0 30" RB-1

## (undated) DEVICE — GOWN XL

## (undated) DEVICE — SUT SILK 2-0 18" SH (POP-OFF)

## (undated) DEVICE — DRAPE TOWEL BLUE 17" X 24"

## (undated) DEVICE — DRAPE 1/2 SHEET 40X57"

## (undated) DEVICE — POLISHER OR CAUTERY TIP

## (undated) DEVICE — DRAPE FLUID WARMER 44 X 66"

## (undated) DEVICE — DRAPE IOBAN 33" X 23"

## (undated) DEVICE — INSUFFLATION NDL COVIDIEN SURGINEEDLE VERESS 120MM

## (undated) DEVICE — SUT VICRYL 0 18" CT-1 UNDYED (POP-OFF)

## (undated) DEVICE — POSITIONER FOAM EGG CRATE ULNAR 2PCS (PINK)

## (undated) DEVICE — PACK PROC UNIV MAYO STAND 29118

## (undated) DEVICE — SUT SILK 5-0 30" RB-1

## (undated) DEVICE — SAW BLADE STRYKER SAGITTAL WIDE MED

## (undated) DEVICE — SUT PDS II 2-0 27" SH

## (undated) DEVICE — SUT PDS II 3-0 27" SH

## (undated) DEVICE — DRSG WEBRIL 6"

## (undated) DEVICE — LIGASURE MARYLAND 37CM

## (undated) DEVICE — DRSG VAC ABTHERS

## (undated) DEVICE — DRSG ACE BANDAGE 6"

## (undated) DEVICE — TORQUE DEVICE FOR GUIDEWIRE 0.0100.038"

## (undated) DEVICE — GOWN ROYAL SILK XL

## (undated) DEVICE — STAPLER COVIDIEN ENDO GIA XL HANDLE

## (undated) DEVICE — PACK UPPER BODY

## (undated) DEVICE — TUBING STRYKER PNEUMOCLEAR HIGH FLOW

## (undated) DEVICE — TROCAR COVIDIEN VERSAPORT BLADELESS OPTICAL 5MM STANDARD

## (undated) DEVICE — DRSG PREVENA PLUS SYSTEM

## (undated) DEVICE — SUT SILK 3-0 18" SH (POP-OFF)

## (undated) DEVICE — SUT VICRYL 2-0 27" CT-1 UNDYED

## (undated) DEVICE — TOURNIQUET ESMARK 4"

## (undated) DEVICE — ELCTR STRYKER NEPTUNE SMOKE EVACUATION PENCIL (GREEN)

## (undated) DEVICE — SUT PDS II 0 27" CT-1

## (undated) DEVICE — SUT SILK 2-0 30" TIES

## (undated) DEVICE — SUT VICRYL 0 18" TIES

## (undated) DEVICE — TIP METZENBAUM SCISSOR MONOPOLAR ENDOCUT (ORANGE)

## (undated) DEVICE — GLV 6.5 PROTEXIS (WHITE)

## (undated) DEVICE — SUT PDS II PLUS 1 27" CT-1

## (undated) DEVICE — TOURNIQUET CUFF 34" DUAL PORT W PLC

## (undated) DEVICE — KIT SIGMOIDOSCOPE NO SWAB SGL USE

## (undated) DEVICE — SUT VICRYL 2-0 27" CT-1

## (undated) DEVICE — GLV 7.5 DERMAPRENE ULTRA

## (undated) DEVICE — SUT SILK 2-0 30" PSL

## (undated) DEVICE — SUT PROLENE 4-0 36" SH

## (undated) DEVICE — SUT VICRYL 2-0 18" TIES

## (undated) DEVICE — LIGASURE IMPACT

## (undated) DEVICE — SUT VICRYL 3-0 18" SH (POP-OFF)

## (undated) DEVICE — TAPE SILK 3"

## (undated) DEVICE — ATS CANNISTERS FOR INFO VAC

## (undated) DEVICE — TROCAR COVIDIEN VISIPORT PLUS 5MM-12MM WITH FIXATION CANNULA

## (undated) DEVICE — SUT PDS II 1 48" TP-1

## (undated) DEVICE — SUT VICRYL 0 36" CT-1

## (undated) DEVICE — SUT SILK 3-0 30" SH

## (undated) DEVICE — MARKING PEN W RULER

## (undated) DEVICE — GLV 7 PROTEXIS ORTHO (CREAM)

## (undated) DEVICE — SUT VICRYL 3-0 27" SH UNDYED

## (undated) DEVICE — PACK GENERAL MINOR

## (undated) DEVICE — GLV 8 PROTEXIS (WHITE)

## (undated) DEVICE — SUT VICRYL PLUS 2-0 18" CT-1 (POP-OFF)

## (undated) DEVICE — DRSG STOCKINETTE TUBULAR COTTON 1PLY 6X48"

## (undated) DEVICE — DRSG ACE BANDAGE 4"

## (undated) DEVICE — DRSG STOCKINETTE IMPERVIOUS XL